# Patient Record
Sex: FEMALE | Race: WHITE | Employment: OTHER | URBAN - NONMETROPOLITAN AREA
[De-identification: names, ages, dates, MRNs, and addresses within clinical notes are randomized per-mention and may not be internally consistent; named-entity substitution may affect disease eponyms.]

---

## 2018-07-09 ENCOUNTER — APPOINTMENT (OUTPATIENT)
Dept: GENERAL RADIOLOGY | Age: 73
End: 2018-07-09
Payer: MEDICARE

## 2018-07-09 ENCOUNTER — HOSPITAL ENCOUNTER (EMERGENCY)
Age: 73
Discharge: HOME | End: 2018-07-09
Payer: MEDICARE

## 2018-07-09 VITALS
WEIGHT: 144 LBS | TEMPERATURE: 96.9 F | DIASTOLIC BLOOD PRESSURE: 64 MMHG | HEART RATE: 91 BPM | RESPIRATION RATE: 16 BRPM | SYSTOLIC BLOOD PRESSURE: 109 MMHG | OXYGEN SATURATION: 94 %

## 2018-07-09 DIAGNOSIS — S62.639A CLOSED AVULSION FRACTURE OF DISTAL PHALANX OF FINGER, INITIAL ENCOUNTER: Primary | ICD-10-CM

## 2018-07-09 DIAGNOSIS — S69.92XA INJURY OF LEFT WRIST, INITIAL ENCOUNTER: ICD-10-CM

## 2018-07-09 PROCEDURE — 99283 EMERGENCY DEPT VISIT LOW MDM: CPT

## 2018-07-09 PROCEDURE — 73080 X-RAY EXAM OF ELBOW: CPT

## 2018-07-09 PROCEDURE — 73090 X-RAY EXAM OF FOREARM: CPT

## 2018-07-09 PROCEDURE — 73110 X-RAY EXAM OF WRIST: CPT

## 2018-07-09 PROCEDURE — 73130 X-RAY EXAM OF HAND: CPT

## 2018-07-09 PROCEDURE — 29130 APPL FINGER SPLINT STATIC: CPT

## 2018-07-09 RX ORDER — ALENDRONATE SODIUM 70 MG/1
70 TABLET ORAL
COMMUNITY
End: 2022-08-15 | Stop reason: SDUPTHER

## 2018-07-09 RX ORDER — ALBUTEROL SULFATE 90 UG/1
2 AEROSOL, METERED RESPIRATORY (INHALATION) EVERY 6 HOURS PRN
Status: ON HOLD | COMMUNITY
End: 2018-10-08

## 2018-07-09 RX ORDER — PREGABALIN 100 MG/1
300 CAPSULE ORAL 2 TIMES DAILY
Status: ON HOLD | COMMUNITY
End: 2020-11-06 | Stop reason: SDUPTHER

## 2018-07-09 RX ORDER — PANTOPRAZOLE SODIUM 40 MG/1
40 GRANULE, DELAYED RELEASE ORAL
COMMUNITY
End: 2022-08-15 | Stop reason: SDUPTHER

## 2018-07-09 RX ORDER — POTASSIUM CHLORIDE 1.5 G/1.77G
20 POWDER, FOR SOLUTION ORAL 2 TIMES DAILY
Status: ON HOLD | COMMUNITY
End: 2022-08-29

## 2018-07-09 RX ORDER — DULOXETIN HYDROCHLORIDE 60 MG/1
60 CAPSULE, DELAYED RELEASE ORAL DAILY
Status: ON HOLD | COMMUNITY
End: 2022-08-29

## 2018-07-09 RX ORDER — HYDROCODONE BITARTRATE AND ACETAMINOPHEN 10; 325 MG/1; MG/1
1 TABLET ORAL EVERY 8 HOURS PRN
Status: ON HOLD | COMMUNITY
End: 2020-11-05 | Stop reason: HOSPADM

## 2018-07-09 RX ORDER — LEVOTHYROXINE SODIUM 0.15 MG/1
200 TABLET ORAL DAILY
COMMUNITY
End: 2022-06-27 | Stop reason: SDUPTHER

## 2018-07-09 RX ORDER — TRAZODONE HYDROCHLORIDE 100 MG/1
100 TABLET ORAL NIGHTLY
COMMUNITY
End: 2022-08-02 | Stop reason: SDUPTHER

## 2018-07-09 RX ORDER — CYANOCOBALAMIN (VITAMIN B-12) 1000 MCG
1 TABLET, EXTENDED RELEASE ORAL 2 TIMES DAILY WITH MEALS
Status: ON HOLD | COMMUNITY
End: 2022-08-29

## 2018-07-09 ASSESSMENT — PAIN DESCRIPTION - LOCATION: LOCATION: HAND

## 2018-07-09 ASSESSMENT — PAIN - FUNCTIONAL ASSESSMENT: PAIN_FUNCTIONAL_ASSESSMENT: 0-10

## 2018-07-09 ASSESSMENT — PAIN SCALES - GENERAL
PAINLEVEL_OUTOF10: 7

## 2018-07-09 ASSESSMENT — PAIN DESCRIPTION - PAIN TYPE: TYPE: ACUTE PAIN

## 2018-07-09 ASSESSMENT — PAIN DESCRIPTION - ORIENTATION: ORIENTATION: LEFT

## 2018-07-10 ASSESSMENT — ENCOUNTER SYMPTOMS
COUGH: 0
SORE THROAT: 0
CONSTIPATION: 0
RHINORRHEA: 0
CHEST TIGHTNESS: 0
BACK PAIN: 0
DIARRHEA: 0
ABDOMINAL PAIN: 0
EYE DISCHARGE: 0
NAUSEA: 0
EYE REDNESS: 0
VOMITING: 0
BLOOD IN STOOL: 0
WHEEZING: 0
SHORTNESS OF BREATH: 0

## 2018-07-11 ENCOUNTER — APPOINTMENT (OUTPATIENT)
Dept: CT IMAGING | Age: 73
End: 2018-07-11
Payer: MEDICARE

## 2018-07-11 ENCOUNTER — HOSPITAL ENCOUNTER (EMERGENCY)
Age: 73
Discharge: ANOTHER HOSPITAL | End: 2018-07-11
Attending: EMERGENCY MEDICINE
Payer: MEDICARE

## 2018-07-11 VITALS
DIASTOLIC BLOOD PRESSURE: 85 MMHG | TEMPERATURE: 98.2 F | SYSTOLIC BLOOD PRESSURE: 138 MMHG | RESPIRATION RATE: 18 BRPM | OXYGEN SATURATION: 97 % | HEART RATE: 82 BPM

## 2018-07-11 LAB
ABSOLUTE EOS #: 0.13 K/UL (ref 0–0.44)
ABSOLUTE IMMATURE GRANULOCYTE: <0.03 K/UL (ref 0–0.3)
ABSOLUTE LYMPH #: 1.73 K/UL (ref 1.1–3.7)
ABSOLUTE MONO #: 0.39 K/UL (ref 0.1–1.2)
ALBUMIN SERPL-MCNC: 3.8 G/DL (ref 3.5–5.2)
ALBUMIN/GLOBULIN RATIO: 1 (ref 1–2.5)
ALP BLD-CCNC: 77 U/L (ref 35–104)
ALT SERPL-CCNC: 44 U/L (ref 5–33)
ANION GAP SERPL CALCULATED.3IONS-SCNC: 12 MMOL/L (ref 9–17)
AST SERPL-CCNC: 75 U/L
BASOPHILS # BLD: 1 % (ref 0–2)
BASOPHILS ABSOLUTE: 0.04 K/UL (ref 0–0.2)
BILIRUB SERPL-MCNC: 0.18 MG/DL (ref 0.3–1.2)
BUN BLDV-MCNC: 12 MG/DL (ref 8–23)
BUN/CREAT BLD: 15 (ref 9–20)
C-REACTIVE PROTEIN: 33.5 MG/L (ref 0–5)
CALCIUM SERPL-MCNC: 9.6 MG/DL (ref 8.6–10.4)
CHLORIDE BLD-SCNC: 98 MMOL/L (ref 98–107)
CO2: 30 MMOL/L (ref 20–31)
CREAT SERPL-MCNC: 0.78 MG/DL (ref 0.5–0.9)
DIFFERENTIAL TYPE: ABNORMAL
EOSINOPHILS RELATIVE PERCENT: 3 % (ref 1–4)
GFR AFRICAN AMERICAN: >60 ML/MIN
GFR NON-AFRICAN AMERICAN: >60 ML/MIN
GFR SERPL CREATININE-BSD FRML MDRD: ABNORMAL ML/MIN/{1.73_M2}
GFR SERPL CREATININE-BSD FRML MDRD: ABNORMAL ML/MIN/{1.73_M2}
GLUCOSE BLD-MCNC: 97 MG/DL (ref 70–99)
HCT VFR BLD CALC: 28.6 % (ref 36.3–47.1)
HEMOGLOBIN: 9.1 G/DL (ref 11.9–15.1)
IMMATURE GRANULOCYTES: 0 %
LYMPHOCYTES # BLD: 39 % (ref 24–43)
MCH RBC QN AUTO: 26.3 PG (ref 25.2–33.5)
MCHC RBC AUTO-ENTMCNC: 31.8 G/DL (ref 28.4–34.8)
MCV RBC AUTO: 82.7 FL (ref 82.6–102.9)
MONOCYTES # BLD: 9 % (ref 3–12)
NRBC AUTOMATED: 0 PER 100 WBC
PDW BLD-RTO: 14.7 % (ref 11.8–14.4)
PLATELET # BLD: 334 K/UL (ref 138–453)
PLATELET ESTIMATE: ABNORMAL
PMV BLD AUTO: 9.5 FL (ref 8.1–13.5)
POTASSIUM SERPL-SCNC: 3.4 MMOL/L (ref 3.7–5.3)
RBC # BLD: 3.46 M/UL (ref 3.95–5.11)
RBC # BLD: ABNORMAL 10*6/UL
SEG NEUTROPHILS: 48 % (ref 36–65)
SEGMENTED NEUTROPHILS ABSOLUTE COUNT: 2.09 K/UL (ref 1.5–8.1)
SODIUM BLD-SCNC: 140 MMOL/L (ref 135–144)
TOTAL PROTEIN: 7.6 G/DL (ref 6.4–8.3)
WBC # BLD: 4.4 K/UL (ref 3.5–11.3)
WBC # BLD: ABNORMAL 10*3/UL

## 2018-07-11 PROCEDURE — 80053 COMPREHEN METABOLIC PANEL: CPT

## 2018-07-11 PROCEDURE — 85025 COMPLETE CBC W/AUTO DIFF WBC: CPT

## 2018-07-11 PROCEDURE — 99284 EMERGENCY DEPT VISIT MOD MDM: CPT

## 2018-07-11 PROCEDURE — 6360000004 HC RX CONTRAST MEDICATION: Performed by: EMERGENCY MEDICINE

## 2018-07-11 PROCEDURE — 86140 C-REACTIVE PROTEIN: CPT

## 2018-07-11 PROCEDURE — 36415 COLL VENOUS BLD VENIPUNCTURE: CPT

## 2018-07-11 PROCEDURE — 72132 CT LUMBAR SPINE W/DYE: CPT

## 2018-07-11 RX ADMIN — IOPAMIDOL 100 ML: 612 INJECTION, SOLUTION INTRAVENOUS at 18:14

## 2018-07-11 ASSESSMENT — ENCOUNTER SYMPTOMS
SORE THROAT: 0
EYE DISCHARGE: 0
EYE PAIN: 0
SINUS PAIN: 0
DIARRHEA: 0
COUGH: 0
SINUS PRESSURE: 0
SHORTNESS OF BREATH: 0
VOMITING: 0
ABDOMINAL PAIN: 0
NAUSEA: 0

## 2018-07-11 ASSESSMENT — PAIN DESCRIPTION - LOCATION: LOCATION: BACK

## 2018-07-11 ASSESSMENT — PAIN DESCRIPTION - PAIN TYPE: TYPE: ACUTE PAIN

## 2018-07-11 ASSESSMENT — PAIN DESCRIPTION - ORIENTATION: ORIENTATION: LOWER

## 2018-07-11 ASSESSMENT — PAIN SCALES - GENERAL: PAINLEVEL_OUTOF10: 7

## 2018-07-12 NOTE — ED NOTES
[facility] to be in contact with accepting doctor, Dr. Haroon Chinchilla.      [place]  07/11/18 2046

## 2018-07-23 ENCOUNTER — HOSPITAL ENCOUNTER (OUTPATIENT)
Age: 73
Setting detail: SPECIMEN
Discharge: HOME | End: 2018-07-23
Payer: MEDICARE

## 2018-07-23 LAB
ABSOLUTE EOS #: 0.07 K/UL (ref 0–0.44)
ABSOLUTE IMMATURE GRANULOCYTE: <0.03 K/UL (ref 0–0.3)
ABSOLUTE LYMPH #: 2.17 K/UL (ref 1.1–3.7)
ABSOLUTE MONO #: 0.5 K/UL (ref 0.1–1.2)
BASOPHILS # BLD: 1 % (ref 0–2)
BASOPHILS ABSOLUTE: 0.03 K/UL (ref 0–0.2)
C-REACTIVE PROTEIN: 2.9 MG/L (ref 0–5)
DIFFERENTIAL TYPE: ABNORMAL
EOSINOPHILS RELATIVE PERCENT: 2 % (ref 1–4)
HCT VFR BLD CALC: 28.4 % (ref 36.3–47.1)
HEMOGLOBIN: 8.9 G/DL (ref 11.9–15.1)
IMMATURE GRANULOCYTES: 0 %
LYMPHOCYTES # BLD: 56 % (ref 24–43)
MCH RBC QN AUTO: 25.3 PG (ref 25.2–33.5)
MCHC RBC AUTO-ENTMCNC: 31.3 G/DL (ref 28.4–34.8)
MCV RBC AUTO: 80.7 FL (ref 82.6–102.9)
MONOCYTES # BLD: 13 % (ref 3–12)
NRBC AUTOMATED: 0 PER 100 WBC
PDW BLD-RTO: 15.3 % (ref 11.8–14.4)
PLATELET # BLD: 364 K/UL (ref 138–453)
PLATELET ESTIMATE: ABNORMAL
PMV BLD AUTO: 10.4 FL (ref 8.1–13.5)
RBC # BLD: 3.52 M/UL (ref 3.95–5.11)
RBC # BLD: ABNORMAL 10*6/UL
SEG NEUTROPHILS: 28 % (ref 36–65)
SEGMENTED NEUTROPHILS ABSOLUTE COUNT: 1.07 K/UL (ref 1.5–8.1)
VANCOMYCIN TROUGH DATE LAST DOSE: NORMAL
VANCOMYCIN TROUGH DOSE AMOUNT: NORMAL
VANCOMYCIN TROUGH TIME LAST DOSE: NORMAL
VANCOMYCIN TROUGH: 18.8 UG/ML (ref 10–20)
WBC # BLD: 3.8 K/UL (ref 3.5–11.3)
WBC # BLD: ABNORMAL 10*3/UL

## 2018-07-23 PROCEDURE — 80202 ASSAY OF VANCOMYCIN: CPT

## 2018-07-23 PROCEDURE — 82565 ASSAY OF CREATININE: CPT

## 2018-07-23 PROCEDURE — 85025 COMPLETE CBC W/AUTO DIFF WBC: CPT

## 2018-07-23 PROCEDURE — 86140 C-REACTIVE PROTEIN: CPT

## 2018-07-24 LAB
CREAT SERPL-MCNC: 0.69 MG/DL (ref 0.5–0.9)
GFR AFRICAN AMERICAN: >60 ML/MIN
GFR NON-AFRICAN AMERICAN: >60 ML/MIN
GFR SERPL CREATININE-BSD FRML MDRD: NORMAL ML/MIN/{1.73_M2}
GFR SERPL CREATININE-BSD FRML MDRD: NORMAL ML/MIN/{1.73_M2}

## 2018-07-30 ENCOUNTER — HOSPITAL ENCOUNTER (OUTPATIENT)
Age: 73
Setting detail: SPECIMEN
Discharge: HOME | End: 2018-07-30
Payer: MEDICARE

## 2018-07-30 LAB
ABSOLUTE EOS #: 0.13 K/UL (ref 0–0.44)
ABSOLUTE IMMATURE GRANULOCYTE: <0.03 K/UL (ref 0–0.3)
ABSOLUTE LYMPH #: 1.18 K/UL (ref 1.1–3.7)
ABSOLUTE MONO #: 0.47 K/UL (ref 0.1–1.2)
ANION GAP SERPL CALCULATED.3IONS-SCNC: 11 MMOL/L (ref 9–17)
BASOPHILS # BLD: 1 % (ref 0–2)
BASOPHILS ABSOLUTE: <0.03 K/UL (ref 0–0.2)
BUN BLDV-MCNC: 18 MG/DL (ref 8–23)
BUN/CREAT BLD: 33 (ref 9–20)
C-REACTIVE PROTEIN: 7.3 MG/L (ref 0–5)
CALCIUM SERPL-MCNC: 9.5 MG/DL (ref 8.6–10.4)
CHLORIDE BLD-SCNC: 104 MMOL/L (ref 98–107)
CO2: 27 MMOL/L (ref 20–31)
CREAT SERPL-MCNC: 0.55 MG/DL (ref 0.5–0.9)
DIFFERENTIAL TYPE: ABNORMAL
EOSINOPHILS RELATIVE PERCENT: 4 % (ref 1–4)
GFR AFRICAN AMERICAN: >60 ML/MIN
GFR NON-AFRICAN AMERICAN: >60 ML/MIN
GFR SERPL CREATININE-BSD FRML MDRD: ABNORMAL ML/MIN/{1.73_M2}
GFR SERPL CREATININE-BSD FRML MDRD: ABNORMAL ML/MIN/{1.73_M2}
GLUCOSE BLD-MCNC: 82 MG/DL (ref 70–99)
HCT VFR BLD CALC: 27.1 % (ref 36.3–47.1)
HEMOGLOBIN: 8.3 G/DL (ref 11.9–15.1)
IMMATURE GRANULOCYTES: 0 %
LYMPHOCYTES # BLD: 32 % (ref 24–43)
MCH RBC QN AUTO: 24.7 PG (ref 25.2–33.5)
MCHC RBC AUTO-ENTMCNC: 30.6 G/DL (ref 28.4–34.8)
MCV RBC AUTO: 80.7 FL (ref 82.6–102.9)
MONOCYTES # BLD: 13 % (ref 3–12)
NRBC AUTOMATED: 0 PER 100 WBC
PDW BLD-RTO: 15.8 % (ref 11.8–14.4)
PLATELET # BLD: 233 K/UL (ref 138–453)
PLATELET ESTIMATE: ABNORMAL
PMV BLD AUTO: 10.4 FL (ref 8.1–13.5)
POTASSIUM SERPL-SCNC: 4.3 MMOL/L (ref 3.7–5.3)
RBC # BLD: 3.36 M/UL (ref 3.95–5.11)
RBC # BLD: ABNORMAL 10*6/UL
SEG NEUTROPHILS: 51 % (ref 36–65)
SEGMENTED NEUTROPHILS ABSOLUTE COUNT: 1.85 K/UL (ref 1.5–8.1)
SODIUM BLD-SCNC: 142 MMOL/L (ref 135–144)
THYROXINE, FREE: 1 NG/DL (ref 0.93–1.7)
TSH SERPL DL<=0.05 MIU/L-ACNC: 0.38 MIU/L (ref 0.3–5)
VANCOMYCIN TROUGH DATE LAST DOSE: NORMAL
VANCOMYCIN TROUGH DOSE AMOUNT: NORMAL
VANCOMYCIN TROUGH TIME LAST DOSE: NORMAL
VANCOMYCIN TROUGH: 16.5 UG/ML (ref 10–20)
WBC # BLD: 3.7 K/UL (ref 3.5–11.3)
WBC # BLD: ABNORMAL 10*3/UL

## 2018-07-30 PROCEDURE — 84443 ASSAY THYROID STIM HORMONE: CPT

## 2018-07-30 PROCEDURE — 80048 BASIC METABOLIC PNL TOTAL CA: CPT

## 2018-07-30 PROCEDURE — 85025 COMPLETE CBC W/AUTO DIFF WBC: CPT

## 2018-07-30 PROCEDURE — 86140 C-REACTIVE PROTEIN: CPT

## 2018-07-30 PROCEDURE — 80202 ASSAY OF VANCOMYCIN: CPT

## 2018-07-30 PROCEDURE — 84439 ASSAY OF FREE THYROXINE: CPT

## 2018-10-05 ENCOUNTER — APPOINTMENT (OUTPATIENT)
Dept: CT IMAGING | Age: 73
DRG: 193 | End: 2018-10-05
Payer: MEDICARE

## 2018-10-05 ENCOUNTER — APPOINTMENT (OUTPATIENT)
Dept: GENERAL RADIOLOGY | Age: 73
DRG: 193 | End: 2018-10-05
Payer: MEDICARE

## 2018-10-05 ENCOUNTER — HOSPITAL ENCOUNTER (INPATIENT)
Age: 73
LOS: 3 days | Discharge: HOME | DRG: 193 | End: 2018-10-08
Attending: EMERGENCY MEDICINE | Admitting: FAMILY MEDICINE
Payer: MEDICARE

## 2018-10-05 DIAGNOSIS — J18.9 PNEUMONIA DUE TO ORGANISM: Primary | ICD-10-CM

## 2018-10-05 PROBLEM — M54.50 CHRONIC MIDLINE LOW BACK PAIN WITHOUT SCIATICA: Status: ACTIVE | Noted: 2018-10-05

## 2018-10-05 PROBLEM — G93.41 ACUTE METABOLIC ENCEPHALOPATHY: Status: ACTIVE | Noted: 2018-10-05

## 2018-10-05 PROBLEM — E03.9 HYPOTHYROIDISM: Status: ACTIVE | Noted: 2018-10-05

## 2018-10-05 PROBLEM — F32.9 MAJOR DEPRESSIVE DISORDER: Status: ACTIVE | Noted: 2018-10-05

## 2018-10-05 PROBLEM — J16.0 PNEUMONIA OF RIGHT UPPER LOBE DUE TO CHLAMYDIA SPECIES: Status: ACTIVE | Noted: 2018-10-05

## 2018-10-05 PROBLEM — G89.29 CHRONIC MIDLINE LOW BACK PAIN WITHOUT SCIATICA: Status: ACTIVE | Noted: 2018-10-05

## 2018-10-05 LAB
ABSOLUTE EOS #: 0.08 K/UL (ref 0–0.44)
ABSOLUTE IMMATURE GRANULOCYTE: 0 K/UL (ref 0–0.3)
ABSOLUTE LYMPH #: 0.75 K/UL (ref 1.1–3.7)
ABSOLUTE MONO #: 0 K/UL (ref 0.1–1.2)
ALBUMIN SERPL-MCNC: 3.8 G/DL (ref 3.5–5.2)
ALBUMIN/GLOBULIN RATIO: 1.3 (ref 1–2.5)
ALP BLD-CCNC: 78 U/L (ref 35–104)
ALT SERPL-CCNC: 15 U/L (ref 5–33)
ANION GAP SERPL CALCULATED.3IONS-SCNC: 10 MMOL/L (ref 9–17)
AST SERPL-CCNC: 18 U/L
BASOPHILS # BLD: 0 % (ref 0–2)
BASOPHILS ABSOLUTE: 0 K/UL (ref 0–0.2)
BILIRUB SERPL-MCNC: 0.27 MG/DL (ref 0.3–1.2)
BILIRUBIN DIRECT: <0.08 MG/DL
BILIRUBIN, INDIRECT: ABNORMAL MG/DL (ref 0–1)
BNP INTERPRETATION: NORMAL
BUN BLDV-MCNC: 12 MG/DL (ref 8–23)
BUN/CREAT BLD: 17 (ref 9–20)
CALCIUM SERPL-MCNC: 9.2 MG/DL (ref 8.6–10.4)
CHLORIDE BLD-SCNC: 94 MMOL/L (ref 98–107)
CO2: 32 MMOL/L (ref 20–31)
CREAT SERPL-MCNC: 0.7 MG/DL (ref 0.5–0.9)
DIFFERENTIAL TYPE: ABNORMAL
EKG ATRIAL RATE: 121 BPM
EKG P AXIS: 59 DEGREES
EKG P-R INTERVAL: 158 MS
EKG Q-T INTERVAL: 318 MS
EKG QRS DURATION: 74 MS
EKG QTC CALCULATION (BAZETT): 451 MS
EKG R AXIS: -18 DEGREES
EKG T AXIS: 77 DEGREES
EKG VENTRICULAR RATE: 121 BPM
EOSINOPHILS RELATIVE PERCENT: 1 % (ref 1–4)
GFR AFRICAN AMERICAN: >60 ML/MIN
GFR NON-AFRICAN AMERICAN: >60 ML/MIN
GFR SERPL CREATININE-BSD FRML MDRD: ABNORMAL ML/MIN/{1.73_M2}
GFR SERPL CREATININE-BSD FRML MDRD: ABNORMAL ML/MIN/{1.73_M2}
GLOBULIN: ABNORMAL G/DL (ref 1.5–3.8)
GLUCOSE BLD-MCNC: 115 MG/DL (ref 70–99)
HCT VFR BLD CALC: 35.6 % (ref 36.3–47.1)
HEMOGLOBIN: 10.8 G/DL (ref 11.9–15.1)
IMMATURE GRANULOCYTES: 0 %
LACTIC ACID, SEPSIS WHOLE BLOOD: NORMAL MMOL/L (ref 0.5–1.9)
LACTIC ACID, SEPSIS: 1.5 MMOL/L (ref 0.5–1.9)
LACTIC ACID, WHOLE BLOOD: NORMAL MMOL/L (ref 0.7–2.1)
LACTIC ACID: 2 MMOL/L (ref 0.5–2.2)
LYMPHOCYTES # BLD: 10 % (ref 24–43)
MCH RBC QN AUTO: 24.8 PG (ref 25.2–33.5)
MCHC RBC AUTO-ENTMCNC: 30.3 G/DL (ref 28.4–34.8)
MCV RBC AUTO: 81.8 FL (ref 82.6–102.9)
MONOCYTES # BLD: 0 % (ref 3–12)
MORPHOLOGY: NORMAL
NRBC AUTOMATED: 0 PER 100 WBC
PDW BLD-RTO: 20.5 % (ref 11.8–14.4)
PLATELET # BLD: 245 K/UL (ref 138–453)
PLATELET ESTIMATE: ABNORMAL
PMV BLD AUTO: 9.4 FL (ref 8.1–13.5)
POTASSIUM SERPL-SCNC: 3.2 MMOL/L (ref 3.7–5.3)
PRO-BNP: 143 PG/ML
RBC # BLD: 4.35 M/UL (ref 3.95–5.11)
RBC # BLD: ABNORMAL 10*6/UL
SEG NEUTROPHILS: 89 % (ref 36–65)
SEGMENTED NEUTROPHILS ABSOLUTE COUNT: 6.67 K/UL (ref 1.5–8.1)
SODIUM BLD-SCNC: 136 MMOL/L (ref 135–144)
TOTAL PROTEIN: 6.8 G/DL (ref 6.4–8.3)
TROPONIN INTERP: NORMAL
TROPONIN T: <0.03 NG/ML
WBC # BLD: 7.5 K/UL (ref 3.5–11.3)
WBC # BLD: ABNORMAL 10*3/UL

## 2018-10-05 PROCEDURE — 36415 COLL VENOUS BLD VENIPUNCTURE: CPT

## 2018-10-05 PROCEDURE — 6360000002 HC RX W HCPCS: Performed by: FAMILY MEDICINE

## 2018-10-05 PROCEDURE — 93005 ELECTROCARDIOGRAM TRACING: CPT

## 2018-10-05 PROCEDURE — 6370000000 HC RX 637 (ALT 250 FOR IP): Performed by: FAMILY MEDICINE

## 2018-10-05 PROCEDURE — 6370000000 HC RX 637 (ALT 250 FOR IP)

## 2018-10-05 PROCEDURE — 94668 MNPJ CHEST WALL SBSQ: CPT

## 2018-10-05 PROCEDURE — 70450 CT HEAD/BRAIN W/O DYE: CPT

## 2018-10-05 PROCEDURE — 80048 BASIC METABOLIC PNL TOTAL CA: CPT

## 2018-10-05 PROCEDURE — 84484 ASSAY OF TROPONIN QUANT: CPT

## 2018-10-05 PROCEDURE — 6370000000 HC RX 637 (ALT 250 FOR IP): Performed by: EMERGENCY MEDICINE

## 2018-10-05 PROCEDURE — 2580000003 HC RX 258: Performed by: EMERGENCY MEDICINE

## 2018-10-05 PROCEDURE — 6360000002 HC RX W HCPCS: Performed by: EMERGENCY MEDICINE

## 2018-10-05 PROCEDURE — 85025 COMPLETE CBC W/AUTO DIFF WBC: CPT

## 2018-10-05 PROCEDURE — 2000000000 HC ICU R&B

## 2018-10-05 PROCEDURE — 71046 X-RAY EXAM CHEST 2 VIEWS: CPT

## 2018-10-05 PROCEDURE — 83880 ASSAY OF NATRIURETIC PEPTIDE: CPT

## 2018-10-05 PROCEDURE — 99285 EMERGENCY DEPT VISIT HI MDM: CPT

## 2018-10-05 PROCEDURE — 2580000003 HC RX 258: Performed by: FAMILY MEDICINE

## 2018-10-05 PROCEDURE — 94640 AIRWAY INHALATION TREATMENT: CPT

## 2018-10-05 PROCEDURE — 94667 MNPJ CHEST WALL 1ST: CPT

## 2018-10-05 PROCEDURE — 94761 N-INVAS EAR/PLS OXIMETRY MLT: CPT

## 2018-10-05 PROCEDURE — 2700000000 HC OXYGEN THERAPY PER DAY

## 2018-10-05 PROCEDURE — 80076 HEPATIC FUNCTION PANEL: CPT

## 2018-10-05 PROCEDURE — 87040 BLOOD CULTURE FOR BACTERIA: CPT

## 2018-10-05 PROCEDURE — 83605 ASSAY OF LACTIC ACID: CPT

## 2018-10-05 PROCEDURE — 94664 DEMO&/EVAL PT USE INHALER: CPT

## 2018-10-05 RX ORDER — ALBUTEROL SULFATE 90 UG/1
2 AEROSOL, METERED RESPIRATORY (INHALATION) EVERY 6 HOURS PRN
Status: DISCONTINUED | OUTPATIENT
Start: 2018-10-05 | End: 2018-10-08 | Stop reason: HOSPADM

## 2018-10-05 RX ORDER — ALBUTEROL SULFATE 2.5 MG/3ML
2.5 SOLUTION RESPIRATORY (INHALATION) EVERY 4 HOURS PRN
Status: DISCONTINUED | OUTPATIENT
Start: 2018-10-05 | End: 2018-10-08 | Stop reason: HOSPADM

## 2018-10-05 RX ORDER — SODIUM CHLORIDE 0.9 % (FLUSH) 0.9 %
10 SYRINGE (ML) INJECTION EVERY 12 HOURS SCHEDULED
Status: DISCONTINUED | OUTPATIENT
Start: 2018-10-05 | End: 2018-10-08 | Stop reason: HOSPADM

## 2018-10-05 RX ORDER — ALENDRONATE SODIUM 70 MG/1
70 TABLET ORAL
Status: DISCONTINUED | OUTPATIENT
Start: 2018-10-05 | End: 2018-10-05 | Stop reason: CLARIF

## 2018-10-05 RX ORDER — IPRATROPIUM BROMIDE AND ALBUTEROL SULFATE 2.5; .5 MG/3ML; MG/3ML
1 SOLUTION RESPIRATORY (INHALATION) EVERY 4 HOURS
Status: DISCONTINUED | OUTPATIENT
Start: 2018-10-05 | End: 2018-10-05

## 2018-10-05 RX ORDER — AZITHROMYCIN 250 MG/1
500 TABLET, FILM COATED ORAL ONCE
Status: COMPLETED | OUTPATIENT
Start: 2018-10-05 | End: 2018-10-05

## 2018-10-05 RX ORDER — PANTOPRAZOLE SODIUM 40 MG/1
40 TABLET, DELAYED RELEASE ORAL
Status: DISCONTINUED | OUTPATIENT
Start: 2018-10-05 | End: 2018-10-08 | Stop reason: HOSPADM

## 2018-10-05 RX ORDER — OYSTER SHELL CALCIUM WITH VITAMIN D 500; 200 MG/1; [IU]/1
1 TABLET, FILM COATED ORAL 2 TIMES DAILY WITH MEALS
Status: DISCONTINUED | OUTPATIENT
Start: 2018-10-05 | End: 2018-10-08 | Stop reason: HOSPADM

## 2018-10-05 RX ORDER — POTASSIUM CHLORIDE 20 MEQ/1
40 TABLET, EXTENDED RELEASE ORAL 2 TIMES DAILY
Status: DISCONTINUED | OUTPATIENT
Start: 2018-10-05 | End: 2018-10-08 | Stop reason: HOSPADM

## 2018-10-05 RX ORDER — IPRATROPIUM BROMIDE AND ALBUTEROL SULFATE 2.5; .5 MG/3ML; MG/3ML
1 SOLUTION RESPIRATORY (INHALATION) 4 TIMES DAILY
Status: DISCONTINUED | OUTPATIENT
Start: 2018-10-05 | End: 2018-10-08 | Stop reason: HOSPADM

## 2018-10-05 RX ORDER — HYDROCODONE BITARTRATE AND ACETAMINOPHEN 10; 325 MG/1; MG/1
1 TABLET ORAL EVERY 8 HOURS PRN
Status: DISCONTINUED | OUTPATIENT
Start: 2018-10-05 | End: 2018-10-06

## 2018-10-05 RX ORDER — ASPIRIN 81 MG/1
81 TABLET ORAL DAILY
Status: DISCONTINUED | OUTPATIENT
Start: 2018-10-05 | End: 2018-10-08 | Stop reason: HOSPADM

## 2018-10-05 RX ORDER — DULOXETIN HYDROCHLORIDE 60 MG/1
60 CAPSULE, DELAYED RELEASE ORAL DAILY
Status: DISCONTINUED | OUTPATIENT
Start: 2018-10-05 | End: 2018-10-08 | Stop reason: HOSPADM

## 2018-10-05 RX ORDER — SODIUM CHLORIDE 0.9 % (FLUSH) 0.9 %
10 SYRINGE (ML) INJECTION PRN
Status: DISCONTINUED | OUTPATIENT
Start: 2018-10-05 | End: 2018-10-08 | Stop reason: HOSPADM

## 2018-10-05 RX ORDER — PREGABALIN 75 MG/1
300 CAPSULE ORAL 2 TIMES DAILY
Status: DISCONTINUED | OUTPATIENT
Start: 2018-10-05 | End: 2018-10-08 | Stop reason: HOSPADM

## 2018-10-05 RX ORDER — 0.9 % SODIUM CHLORIDE 0.9 %
30 INTRAVENOUS SOLUTION INTRAVENOUS ONCE
Status: DISCONTINUED | OUTPATIENT
Start: 2018-10-05 | End: 2018-10-08 | Stop reason: HOSPADM

## 2018-10-05 RX ORDER — IPRATROPIUM BROMIDE AND ALBUTEROL SULFATE 2.5; .5 MG/3ML; MG/3ML
SOLUTION RESPIRATORY (INHALATION)
Status: COMPLETED
Start: 2018-10-05 | End: 2018-10-05

## 2018-10-05 RX ORDER — TRAZODONE HYDROCHLORIDE 50 MG/1
200 TABLET ORAL NIGHTLY
Status: DISCONTINUED | OUTPATIENT
Start: 2018-10-05 | End: 2018-10-08 | Stop reason: HOSPADM

## 2018-10-05 RX ORDER — SODIUM CHLORIDE 9 MG/ML
INJECTION, SOLUTION INTRAVENOUS CONTINUOUS
Status: DISCONTINUED | OUTPATIENT
Start: 2018-10-05 | End: 2018-10-08

## 2018-10-05 RX ORDER — 0.9 % SODIUM CHLORIDE 0.9 %
500 INTRAVENOUS SOLUTION INTRAVENOUS ONCE
Status: COMPLETED | OUTPATIENT
Start: 2018-10-05 | End: 2018-10-05

## 2018-10-05 RX ORDER — LEVOTHYROXINE SODIUM 0.15 MG/1
150 TABLET ORAL DAILY
Status: DISCONTINUED | OUTPATIENT
Start: 2018-10-05 | End: 2018-10-08 | Stop reason: HOSPADM

## 2018-10-05 RX ADMIN — IPRATROPIUM BROMIDE AND ALBUTEROL SULFATE 1 AMPULE: .5; 3 SOLUTION RESPIRATORY (INHALATION) at 15:43

## 2018-10-05 RX ADMIN — PREGABALIN 300 MG: 75 CAPSULE ORAL at 20:58

## 2018-10-05 RX ADMIN — AZITHROMYCIN 500 MG: 250 TABLET, FILM COATED ORAL at 08:32

## 2018-10-05 RX ADMIN — SODIUM CHLORIDE: 9 INJECTION, SOLUTION INTRAVENOUS at 10:28

## 2018-10-05 RX ADMIN — SODIUM CHLORIDE: 9 INJECTION, SOLUTION INTRAVENOUS at 23:44

## 2018-10-05 RX ADMIN — CEFTRIAXONE 1 G: 1 INJECTION, POWDER, FOR SOLUTION INTRAMUSCULAR; INTRAVENOUS at 08:33

## 2018-10-05 RX ADMIN — TRAZODONE HYDROCHLORIDE 200 MG: 50 TABLET ORAL at 20:58

## 2018-10-05 RX ADMIN — IPRATROPIUM BROMIDE AND ALBUTEROL SULFATE 1 AMPULE: 2.5; .5 SOLUTION RESPIRATORY (INHALATION) at 10:20

## 2018-10-05 RX ADMIN — PANTOPRAZOLE SODIUM 40 MG: 40 TABLET, DELAYED RELEASE ORAL at 16:50

## 2018-10-05 RX ADMIN — PREGABALIN 300 MG: 75 CAPSULE ORAL at 10:41

## 2018-10-05 RX ADMIN — POTASSIUM CHLORIDE 40 MEQ: 20 TABLET, EXTENDED RELEASE ORAL at 20:58

## 2018-10-05 RX ADMIN — SODIUM CHLORIDE 500 ML: 9 INJECTION, SOLUTION INTRAVENOUS at 08:21

## 2018-10-05 RX ADMIN — DULOXETINE HYDROCHLORIDE 60 MG: 60 CAPSULE, DELAYED RELEASE ORAL at 10:41

## 2018-10-05 RX ADMIN — IPRATROPIUM BROMIDE AND ALBUTEROL SULFATE 1 AMPULE: .5; 3 SOLUTION RESPIRATORY (INHALATION) at 10:20

## 2018-10-05 RX ADMIN — LEVOTHYROXINE SODIUM 150 MCG: 150 TABLET ORAL at 10:41

## 2018-10-05 RX ADMIN — HYDROCODONE BITARTRATE AND ACETAMINOPHEN 1 TABLET: 10; 325 TABLET ORAL at 20:57

## 2018-10-05 RX ADMIN — SODIUM CHLORIDE: 9 INJECTION, SOLUTION INTRAVENOUS at 16:52

## 2018-10-05 RX ADMIN — ASPIRIN 81 MG: 81 TABLET, COATED ORAL at 10:41

## 2018-10-05 RX ADMIN — IPRATROPIUM BROMIDE AND ALBUTEROL SULFATE 1 AMPULE: .5; 3 SOLUTION RESPIRATORY (INHALATION) at 20:37

## 2018-10-05 RX ADMIN — POTASSIUM CHLORIDE 40 MEQ: 20 TABLET, EXTENDED RELEASE ORAL at 10:41

## 2018-10-05 RX ADMIN — HYDROCODONE BITARTRATE AND ACETAMINOPHEN 1 TABLET: 10; 325 TABLET ORAL at 10:41

## 2018-10-05 RX ADMIN — Medication 1 TABLET: at 10:41

## 2018-10-05 RX ADMIN — ENOXAPARIN SODIUM 40 MG: 40 INJECTION SUBCUTANEOUS at 10:41

## 2018-10-05 RX ADMIN — Medication 1 TABLET: at 16:50

## 2018-10-05 ASSESSMENT — PAIN DESCRIPTION - DESCRIPTORS
DESCRIPTORS: ACHING
DESCRIPTORS: BURNING
DESCRIPTORS: ACHING

## 2018-10-05 ASSESSMENT — PAIN DESCRIPTION - PAIN TYPE
TYPE: OTHER (COMMENT);CHRONIC PAIN
TYPE: CHRONIC PAIN

## 2018-10-05 ASSESSMENT — PAIN SCALES - GENERAL
PAINLEVEL_OUTOF10: 7
PAINLEVEL_OUTOF10: 4
PAINLEVEL_OUTOF10: 7

## 2018-10-05 ASSESSMENT — PAIN DESCRIPTION - LOCATION
LOCATION: BACK
LOCATION: BACK;HIP
LOCATION: BACK

## 2018-10-05 ASSESSMENT — PAIN DESCRIPTION - FREQUENCY
FREQUENCY: CONTINUOUS

## 2018-10-05 NOTE — ED PROVIDER NOTES
Pupils are equal, round, and reactive to light. Conjunctivae and EOM are normal.   Neck: Normal range of motion. Neck supple. Cardiovascular: Normal rate, regular rhythm and normal heart sounds. Pulmonary/Chest: Effort normal and breath sounds normal. No respiratory distress. Right sided coarse breath sounds consistent with ammonia   Abdominal: Soft. Bowel sounds are normal.   Musculoskeletal:   Tremulous and bilaterally weak on upper and lower extremities   Neurological: She is alert and oriented to person, place, and time. She displays normal reflexes. No cranial nerve deficit. She exhibits abnormal muscle tone. Coordination abnormal.   Skin: Skin is warm and dry. DIAGNOSTIC RESULTS     EKG: All EKG's are interpreted by the Emergency Department Physician who either signs or Co-signs this chart in the absence of a cardiologist.      RADIOLOGY:   Non-plain film images such as CT, Ultrasound and MRI are read by the radiologist. Plain radiographic images are visualized and preliminarily interpreted by the emergency physician with the below findings:      Interpretation per the Radiologist below, if available at the time of this note:    XR CHEST STANDARD (2 VW)   Final Result   Slight interval improvement presumed right-sided pneumonia. No other   interval change. CT Head WO Contrast   Final Result   No acute intracranial abnormality. Mild cerebral atrophy and chronic small vessel ischemic changes in the white   matter. XR CHEST STANDARD (2 VW)   Final Result   Opacities in the right upper lobe and right middle lobe are presumed to   represent pneumonia. Recommend follow-up chest radiograph after treatment to   ensure resolution.                ED BEDSIDE ULTRASOUND:   Performed by ED Physician - none    LABS:  Labs Reviewed   BASIC METABOLIC PANEL - Abnormal; Notable for the following:        Result Value    Glucose 115 (*)     Potassium 3.2 (*)     Chloride 94 (*)     CO2 32 (*)

## 2018-10-05 NOTE — H&P
sciatica 10/05/2018       Plan:     · This patient requires inpatient admission  To icu because of pneumonia involving rt upper and lower lobe and change in mental status requiring iv antibiotics,fluids ,oxygen,aerosols  · Factors affecting the medical complexity of this patient include acute metabolic encephalopathy  · Estimated length of stay is 2 days  · High risk medication monitoring: none    CORE MEASURES  DVT prophylaxis: Lovenox  Decubitus ulcer present on admission: No  CODE STATUS: FULL CODE  Nutrition Status: fair   Physical therapy: NA   Old Charts reviewed: Yes  EKG Reviewed:  Yes  Advance Directive Addressed: Yes  Total time spent in evaluation and management of this patient- 40 min    Pattie Herrera MD  10/5/2018, 1:03 PM

## 2018-10-05 NOTE — PROGRESS NOTES
physician if condition deteriorates. MDI THERAPY with  2 actuations of [physician-ordered bronchodilator(s)] via spacer TID Albuterol and PRNq4 hrs. If unable to utilize MDI: HHN [physician-ordered bronchodilator(s)] TID and Albuterol PRN q4 hrs. Notify physician if condition deteriorates. MDI THERAPY with  [physician-ordered bronchodilator(s)] via spacer TID PRN. If unable to utilize MDI: HHN [physician-ordered bronchodilator(s)] TID PRN. Notify physician if condition deteriorates. If Acuity Level is 2, 3, or 4 in any of the following:    [] COUGH     [] SURGICAL HISTORY (SURG HX)  [x] CHEST XRAY (CXR)    Goal: Improvement in sputum mobilization in patients with ineffective airway clearance. Reverse atelectasis. [x] Bronchopulmonary Hygiene Protocol    Total Acuity:   16-32  []  Secondary Assessment in 24 hrs Total Acuity:  9-15  [x]  Secondary Assessment in 24 hrs Total Acuity:  4-8  []  Secondary Assessment in 48 hrs Total Acuity:  0-3  []  Secondary Assessment in 72 hrs   METANEB QID with [physician-ordered bronchodilator(s)] if CXR Acuity = 4; otherwise:  PD&P, PEP, or Vest QID & PRN  NT Sxn PRN for ineffective cough  METANEB QID with [physician-ordered bronchodilator(s)] if CXR Acuity = 4; otherwise:  PD&P, PEP, or Vest TID & PRN  NT Sxn PRN for ineffective cough  Instruct patient to self-perform IS q1hr WA   Directed Cough self-performed q1hr WA     If Acuity Level is 2 or above in the following:    [] PULMONARY HISTORY (PULM HX)    Goal: Assist patient in quitting smoking to slow or stop the progression of lung disease.     [] Smoking Cessation Protocol    SMOKING CESSATION EDUCATION provided according to policy JJ_239: (marlyn with an X)  ____Yes    ____ No     ____ NA    Smoking Cessation Booklet given:  ____Yes  ____No ____Patient Ottie Opitz

## 2018-10-06 PROBLEM — D50.9 IRON DEFICIENCY ANEMIA: Status: ACTIVE | Noted: 2018-10-05

## 2018-10-06 LAB
ABSOLUTE EOS #: 0.17 K/UL (ref 0–0.44)
ABSOLUTE IMMATURE GRANULOCYTE: 0 K/UL (ref 0–0.3)
ABSOLUTE LYMPH #: 1.12 K/UL (ref 1.1–3.7)
ABSOLUTE MONO #: 0.52 K/UL (ref 0.1–1.2)
ALBUMIN SERPL-MCNC: 2.8 G/DL (ref 3.5–5.2)
ALBUMIN/GLOBULIN RATIO: 1 (ref 1–2.5)
ALP BLD-CCNC: 59 U/L (ref 35–104)
ALT SERPL-CCNC: 10 U/L (ref 5–33)
ANION GAP SERPL CALCULATED.3IONS-SCNC: 6 MMOL/L (ref 9–17)
AST SERPL-CCNC: 14 U/L
BASOPHILS # BLD: 0 % (ref 0–2)
BASOPHILS ABSOLUTE: 0 K/UL (ref 0–0.2)
BILIRUB SERPL-MCNC: 0.18 MG/DL (ref 0.3–1.2)
BUN BLDV-MCNC: 9 MG/DL (ref 8–23)
BUN/CREAT BLD: 16 (ref 9–20)
CALCIUM SERPL-MCNC: 8 MG/DL (ref 8.6–10.4)
CHLORIDE BLD-SCNC: 102 MMOL/L (ref 98–107)
CO2: 28 MMOL/L (ref 20–31)
CREAT SERPL-MCNC: 0.55 MG/DL (ref 0.5–0.9)
DIFFERENTIAL TYPE: ABNORMAL
EOSINOPHILS RELATIVE PERCENT: 2 % (ref 1–4)
GFR AFRICAN AMERICAN: >60 ML/MIN
GFR NON-AFRICAN AMERICAN: >60 ML/MIN
GFR SERPL CREATININE-BSD FRML MDRD: ABNORMAL ML/MIN/{1.73_M2}
GFR SERPL CREATININE-BSD FRML MDRD: ABNORMAL ML/MIN/{1.73_M2}
GLUCOSE BLD-MCNC: 96 MG/DL (ref 70–99)
HCT VFR BLD CALC: 29.2 % (ref 36.3–47.1)
HEMOGLOBIN: 8.8 G/DL (ref 11.9–15.1)
IMMATURE GRANULOCYTES: 0 %
INR BLD: 1 (ref 0.9–1.2)
LYMPHOCYTES # BLD: 13 % (ref 24–43)
MAGNESIUM: 2.5 MG/DL (ref 1.6–2.6)
MCH RBC QN AUTO: 25.1 PG (ref 25.2–33.5)
MCHC RBC AUTO-ENTMCNC: 30.1 G/DL (ref 28.4–34.8)
MCV RBC AUTO: 83.2 FL (ref 82.6–102.9)
MONOCYTES # BLD: 6 % (ref 3–12)
MORPHOLOGY: ABNORMAL
NRBC AUTOMATED: 0 PER 100 WBC
PDW BLD-RTO: 21.2 % (ref 11.8–14.4)
PLATELET # BLD: 211 K/UL (ref 138–453)
PLATELET ESTIMATE: ABNORMAL
PMV BLD AUTO: 9.2 FL (ref 8.1–13.5)
POTASSIUM SERPL-SCNC: 3.9 MMOL/L (ref 3.7–5.3)
PROTHROMBIN TIME: 10.8 SEC (ref 9.7–12.2)
RBC # BLD: 3.51 M/UL (ref 3.95–5.11)
RBC # BLD: ABNORMAL 10*6/UL
SEG NEUTROPHILS: 79 % (ref 36–65)
SEGMENTED NEUTROPHILS ABSOLUTE COUNT: 6.79 K/UL (ref 1.5–8.1)
SODIUM BLD-SCNC: 136 MMOL/L (ref 135–144)
TOTAL PROTEIN: 5.5 G/DL (ref 6.4–8.3)
WBC # BLD: 8.6 K/UL (ref 3.5–11.3)
WBC # BLD: ABNORMAL 10*3/UL

## 2018-10-06 PROCEDURE — 6360000002 HC RX W HCPCS: Performed by: FAMILY MEDICINE

## 2018-10-06 PROCEDURE — 2000000000 HC ICU R&B

## 2018-10-06 PROCEDURE — 94762 N-INVAS EAR/PLS OXIMTRY CONT: CPT

## 2018-10-06 PROCEDURE — 94640 AIRWAY INHALATION TREATMENT: CPT

## 2018-10-06 PROCEDURE — 85610 PROTHROMBIN TIME: CPT

## 2018-10-06 PROCEDURE — 94664 DEMO&/EVAL PT USE INHALER: CPT

## 2018-10-06 PROCEDURE — 36415 COLL VENOUS BLD VENIPUNCTURE: CPT

## 2018-10-06 PROCEDURE — 94668 MNPJ CHEST WALL SBSQ: CPT

## 2018-10-06 PROCEDURE — 6370000000 HC RX 637 (ALT 250 FOR IP): Performed by: FAMILY MEDICINE

## 2018-10-06 PROCEDURE — 80053 COMPREHEN METABOLIC PANEL: CPT

## 2018-10-06 PROCEDURE — 83735 ASSAY OF MAGNESIUM: CPT

## 2018-10-06 PROCEDURE — 2580000003 HC RX 258: Performed by: FAMILY MEDICINE

## 2018-10-06 PROCEDURE — 85025 COMPLETE CBC W/AUTO DIFF WBC: CPT

## 2018-10-06 PROCEDURE — 94761 N-INVAS EAR/PLS OXIMETRY MLT: CPT

## 2018-10-06 RX ORDER — HYDROCODONE BITARTRATE AND ACETAMINOPHEN 10; 325 MG/1; MG/1
1 TABLET ORAL EVERY 6 HOURS PRN
Status: DISCONTINUED | OUTPATIENT
Start: 2018-10-06 | End: 2018-10-08 | Stop reason: HOSPADM

## 2018-10-06 RX ORDER — 0.9 % SODIUM CHLORIDE 0.9 %
500 INTRAVENOUS SOLUTION INTRAVENOUS ONCE
Status: COMPLETED | OUTPATIENT
Start: 2018-10-06 | End: 2018-10-06

## 2018-10-06 RX ADMIN — TRAZODONE HYDROCHLORIDE 200 MG: 50 TABLET ORAL at 22:45

## 2018-10-06 RX ADMIN — IPRATROPIUM BROMIDE AND ALBUTEROL SULFATE 1 AMPULE: .5; 3 SOLUTION RESPIRATORY (INHALATION) at 11:03

## 2018-10-06 RX ADMIN — POTASSIUM CHLORIDE 40 MEQ: 20 TABLET, EXTENDED RELEASE ORAL at 08:55

## 2018-10-06 RX ADMIN — PREGABALIN 300 MG: 75 CAPSULE ORAL at 20:04

## 2018-10-06 RX ADMIN — SODIUM CHLORIDE: 9 INJECTION, SOLUTION INTRAVENOUS at 22:45

## 2018-10-06 RX ADMIN — AZITHROMYCIN MONOHYDRATE 500 MG: 500 INJECTION, POWDER, LYOPHILIZED, FOR SOLUTION INTRAVENOUS at 09:01

## 2018-10-06 RX ADMIN — SODIUM CHLORIDE: 9 INJECTION, SOLUTION INTRAVENOUS at 07:25

## 2018-10-06 RX ADMIN — ENOXAPARIN SODIUM 40 MG: 40 INJECTION SUBCUTANEOUS at 08:56

## 2018-10-06 RX ADMIN — IPRATROPIUM BROMIDE AND ALBUTEROL SULFATE 1 AMPULE: .5; 3 SOLUTION RESPIRATORY (INHALATION) at 17:08

## 2018-10-06 RX ADMIN — SODIUM CHLORIDE 500 ML: 9 INJECTION, SOLUTION INTRAVENOUS at 01:22

## 2018-10-06 RX ADMIN — HYDROCODONE BITARTRATE AND ACETAMINOPHEN 1 TABLET: 10; 325 TABLET ORAL at 15:29

## 2018-10-06 RX ADMIN — CEFTRIAXONE 1 G: 1 INJECTION, POWDER, FOR SOLUTION INTRAMUSCULAR; INTRAVENOUS at 08:56

## 2018-10-06 RX ADMIN — ASPIRIN 81 MG: 81 TABLET, COATED ORAL at 08:56

## 2018-10-06 RX ADMIN — ALBUTEROL SULFATE 2.5 MG: 2.5 SOLUTION RESPIRATORY (INHALATION) at 03:06

## 2018-10-06 RX ADMIN — IPRATROPIUM BROMIDE AND ALBUTEROL SULFATE 1 AMPULE: .5; 3 SOLUTION RESPIRATORY (INHALATION) at 07:48

## 2018-10-06 RX ADMIN — Medication 1 TABLET: at 17:18

## 2018-10-06 RX ADMIN — LEVOTHYROXINE SODIUM 150 MCG: 150 TABLET ORAL at 07:29

## 2018-10-06 RX ADMIN — POTASSIUM CHLORIDE 40 MEQ: 20 TABLET, EXTENDED RELEASE ORAL at 20:04

## 2018-10-06 RX ADMIN — PANTOPRAZOLE SODIUM 40 MG: 40 TABLET, DELAYED RELEASE ORAL at 07:29

## 2018-10-06 RX ADMIN — Medication 1 TABLET: at 08:56

## 2018-10-06 RX ADMIN — SODIUM CHLORIDE: 9 INJECTION, SOLUTION INTRAVENOUS at 14:52

## 2018-10-06 RX ADMIN — HYDROCODONE BITARTRATE AND ACETAMINOPHEN 1 TABLET: 10; 325 TABLET ORAL at 21:04

## 2018-10-06 RX ADMIN — HYDROCODONE BITARTRATE AND ACETAMINOPHEN 1 TABLET: 10; 325 TABLET ORAL at 07:29

## 2018-10-06 RX ADMIN — DULOXETINE HYDROCHLORIDE 60 MG: 60 CAPSULE, DELAYED RELEASE ORAL at 08:55

## 2018-10-06 RX ADMIN — PANTOPRAZOLE SODIUM 40 MG: 40 TABLET, DELAYED RELEASE ORAL at 15:29

## 2018-10-06 RX ADMIN — IPRATROPIUM BROMIDE AND ALBUTEROL SULFATE 1 AMPULE: .5; 3 SOLUTION RESPIRATORY (INHALATION) at 21:32

## 2018-10-06 RX ADMIN — Medication 10 ML: at 08:57

## 2018-10-06 RX ADMIN — PREGABALIN 300 MG: 75 CAPSULE ORAL at 08:56

## 2018-10-06 ASSESSMENT — PAIN DESCRIPTION - ORIENTATION
ORIENTATION: RIGHT;MID
ORIENTATION: RIGHT;MID
ORIENTATION: MID;LOWER
ORIENTATION: RIGHT;MID
ORIENTATION: MID;LOWER;RIGHT

## 2018-10-06 ASSESSMENT — PAIN DESCRIPTION - LOCATION
LOCATION: BACK;RIB CAGE
LOCATION: BACK

## 2018-10-06 ASSESSMENT — PAIN DESCRIPTION - PAIN TYPE
TYPE: CHRONIC PAIN

## 2018-10-06 ASSESSMENT — PAIN DESCRIPTION - ONSET
ONSET: ON-GOING

## 2018-10-06 ASSESSMENT — PAIN DESCRIPTION - FREQUENCY
FREQUENCY: CONTINUOUS
FREQUENCY: INTERMITTENT

## 2018-10-06 ASSESSMENT — PAIN SCALES - GENERAL
PAINLEVEL_OUTOF10: 7
PAINLEVEL_OUTOF10: 7
PAINLEVEL_OUTOF10: 4
PAINLEVEL_OUTOF10: 5
PAINLEVEL_OUTOF10: 6
PAINLEVEL_OUTOF10: 4
PAINLEVEL_OUTOF10: 4
PAINLEVEL_OUTOF10: 6
PAINLEVEL_OUTOF10: 6

## 2018-10-06 ASSESSMENT — PAIN DESCRIPTION - DESCRIPTORS
DESCRIPTORS: DISCOMFORT
DESCRIPTORS: ACHING

## 2018-10-06 NOTE — PROGRESS NOTES
RESPIRATORY ASSESSMENT PROTOCOL                                                                                              Patient Name: Heidi Dupree Room#: [id number] : 1945     Admitting diagnosis: Pneumonia of right upper lobe due to infectious organism [facility]) [J18.1]       Medical History:   Past Medical History:   Diagnosis Date    Depression     GERD (gastroesophageal reflux disease)     Osteoporosis        PATIENT ASSESSMENT    LABORATORY DATA  Hematology:   Lab Results   Component Value Date    WBC 8.6 10/06/2018    RBC 3.51 10/06/2018    HGB 8.8 10/06/2018    HCT 29.2 10/06/2018     10/06/2018     Chemistry:  No results found for: PHART, WGG3XRV, PO2ART, R9JFZKOL, CZZ9WPK, PBEA    Blood Culture:   Sputum Culture:     VITALS  Pulse: 74   Resp: 22  BP: 132/82  SpO2: 98 % O2 Device: Nasal cannula (removed oxygen, see note.)  Temp: 98.2 °F (36.8 °C)  Comment:   SKIN COLOR  [] Normal  [] Pale  [] Dusky  [] Cyanotic      RESPIRATORY PATTERN  [] Normal  [] Dyspnea  [] Cheyne-Granger  [] Kussmaul  [] Biots  AMBULATORY  [] Yes  [] No  [] With Assistance    PEAK FLOW  Predicted:     Personal Best:        VITAL CAPACITY  Predicted value:  ml  Actual Value:  ml  30% of Predicted:  ml  Patient Acuity 0 1 2 3 4 Score   Level of Concious (LOC) [x]  Alert & Oriented or Pt normal LOC []  Confused;follows directions []  Confused & uncooper-ative []  Obtunded []  Comatose 0   Respiratory Rate  (RR) []  Reg. rate & pattern. 12 - 20 bpm  []  Increased RR. Greater than 20 bpm   [x]  SOB w/ exertion or RR greater than 24 bpm []  Access- ory muscle use at rest. Abn.  resp. []  SOB at rest.   2   Bilateral Breath Sounds (BBS) []  Clear []  Diminish-ed bases  [x]  Diminish-ed t/o, or rales   []  Sporadic, scattered wheezes or rhonchi []  Persistentwheezes and, or absent BBS 2   Cough [x]  Strong, effective, & non-prod. []  Effective & prod.  Less than 25 ml (2 TBSP) over past 24 hrs []  Ineffective & non-prod

## 2018-10-06 NOTE — PROGRESS NOTES
Pt resting in bed, c/o soreness and pain to back and right side, rib cage, see mar. Pt right hand swollen, pulses strong and equal, right arm and hand elevated on pillow, will continue to monitor. Pt refused to get up to chair at this time, and refused bath at this time.

## 2018-10-07 LAB
ABSOLUTE EOS #: 0.13 K/UL (ref 0–0.44)
ABSOLUTE IMMATURE GRANULOCYTE: 0 K/UL (ref 0–0.3)
ABSOLUTE LYMPH #: 1.19 K/UL (ref 1.1–3.7)
ABSOLUTE MONO #: 0.44 K/UL (ref 0.1–1.2)
ALBUMIN SERPL-MCNC: 3.1 G/DL (ref 3.5–5.2)
ALBUMIN/GLOBULIN RATIO: 1.1 (ref 1–2.5)
ALP BLD-CCNC: 61 U/L (ref 35–104)
ALT SERPL-CCNC: 10 U/L (ref 5–33)
ANION GAP SERPL CALCULATED.3IONS-SCNC: 7 MMOL/L (ref 9–17)
AST SERPL-CCNC: 13 U/L
BASOPHILS # BLD: 1 % (ref 0–2)
BASOPHILS ABSOLUTE: 0.04 K/UL (ref 0–0.2)
BILIRUB SERPL-MCNC: 0.27 MG/DL (ref 0.3–1.2)
BUN BLDV-MCNC: 6 MG/DL (ref 8–23)
BUN/CREAT BLD: 12 (ref 9–20)
CALCIUM SERPL-MCNC: 8.6 MG/DL (ref 8.6–10.4)
CHLORIDE BLD-SCNC: 108 MMOL/L (ref 98–107)
CO2: 23 MMOL/L (ref 20–31)
CREAT SERPL-MCNC: 0.49 MG/DL (ref 0.5–0.9)
DIFFERENTIAL TYPE: ABNORMAL
EOSINOPHILS RELATIVE PERCENT: 3 % (ref 1–4)
GFR AFRICAN AMERICAN: >60 ML/MIN
GFR NON-AFRICAN AMERICAN: >60 ML/MIN
GFR SERPL CREATININE-BSD FRML MDRD: ABNORMAL ML/MIN/{1.73_M2}
GFR SERPL CREATININE-BSD FRML MDRD: ABNORMAL ML/MIN/{1.73_M2}
GLUCOSE BLD-MCNC: 96 MG/DL (ref 70–99)
HCT VFR BLD CALC: 30.2 % (ref 36.3–47.1)
HEMOGLOBIN: 9.2 G/DL (ref 11.9–15.1)
IMMATURE GRANULOCYTES: 0 %
INR BLD: 1 (ref 0.9–1.2)
LYMPHOCYTES # BLD: 27 % (ref 24–43)
MAGNESIUM: 2.4 MG/DL (ref 1.6–2.6)
MCH RBC QN AUTO: 25.3 PG (ref 25.2–33.5)
MCHC RBC AUTO-ENTMCNC: 30.5 G/DL (ref 28.4–34.8)
MCV RBC AUTO: 83.2 FL (ref 82.6–102.9)
MONOCYTES # BLD: 10 % (ref 3–12)
MORPHOLOGY: NORMAL
NRBC AUTOMATED: 0 PER 100 WBC
PDW BLD-RTO: 21.2 % (ref 11.8–14.4)
PLATELET # BLD: 218 K/UL (ref 138–453)
PLATELET ESTIMATE: ABNORMAL
PMV BLD AUTO: 9.1 FL (ref 8.1–13.5)
POTASSIUM SERPL-SCNC: 4.1 MMOL/L (ref 3.7–5.3)
PROTHROMBIN TIME: 10 SEC (ref 9.7–12.2)
RBC # BLD: 3.63 M/UL (ref 3.95–5.11)
RBC # BLD: ABNORMAL 10*6/UL
SEG NEUTROPHILS: 59 % (ref 36–65)
SEGMENTED NEUTROPHILS ABSOLUTE COUNT: 2.6 K/UL (ref 1.5–8.1)
SODIUM BLD-SCNC: 138 MMOL/L (ref 135–144)
TOTAL PROTEIN: 6 G/DL (ref 6.4–8.3)
WBC # BLD: 4.4 K/UL (ref 3.5–11.3)
WBC # BLD: ABNORMAL 10*3/UL

## 2018-10-07 PROCEDURE — 6370000000 HC RX 637 (ALT 250 FOR IP): Performed by: FAMILY MEDICINE

## 2018-10-07 PROCEDURE — 6360000002 HC RX W HCPCS: Performed by: FAMILY MEDICINE

## 2018-10-07 PROCEDURE — 85025 COMPLETE CBC W/AUTO DIFF WBC: CPT

## 2018-10-07 PROCEDURE — 80053 COMPREHEN METABOLIC PANEL: CPT

## 2018-10-07 PROCEDURE — 2580000003 HC RX 258: Performed by: FAMILY MEDICINE

## 2018-10-07 PROCEDURE — 94664 DEMO&/EVAL PT USE INHALER: CPT

## 2018-10-07 PROCEDURE — 83735 ASSAY OF MAGNESIUM: CPT

## 2018-10-07 PROCEDURE — 85610 PROTHROMBIN TIME: CPT

## 2018-10-07 PROCEDURE — 36415 COLL VENOUS BLD VENIPUNCTURE: CPT

## 2018-10-07 PROCEDURE — 1200000000 HC SEMI PRIVATE

## 2018-10-07 PROCEDURE — 94640 AIRWAY INHALATION TREATMENT: CPT

## 2018-10-07 RX ADMIN — PREGABALIN 300 MG: 75 CAPSULE ORAL at 20:27

## 2018-10-07 RX ADMIN — HYDROCODONE BITARTRATE AND ACETAMINOPHEN 1 TABLET: 10; 325 TABLET ORAL at 16:29

## 2018-10-07 RX ADMIN — POTASSIUM CHLORIDE 40 MEQ: 20 TABLET, EXTENDED RELEASE ORAL at 08:20

## 2018-10-07 RX ADMIN — ENOXAPARIN SODIUM 40 MG: 40 INJECTION SUBCUTANEOUS at 08:20

## 2018-10-07 RX ADMIN — CEFTRIAXONE 1 G: 1 INJECTION, POWDER, FOR SOLUTION INTRAMUSCULAR; INTRAVENOUS at 08:20

## 2018-10-07 RX ADMIN — PANTOPRAZOLE SODIUM 40 MG: 40 TABLET, DELAYED RELEASE ORAL at 06:52

## 2018-10-07 RX ADMIN — Medication 1 TABLET: at 08:20

## 2018-10-07 RX ADMIN — Medication 1 TABLET: at 16:11

## 2018-10-07 RX ADMIN — IPRATROPIUM BROMIDE AND ALBUTEROL SULFATE 1 AMPULE: .5; 3 SOLUTION RESPIRATORY (INHALATION) at 09:00

## 2018-10-07 RX ADMIN — HYDROCODONE BITARTRATE AND ACETAMINOPHEN 1 TABLET: 10; 325 TABLET ORAL at 11:06

## 2018-10-07 RX ADMIN — SODIUM CHLORIDE: 9 INJECTION, SOLUTION INTRAVENOUS at 07:25

## 2018-10-07 RX ADMIN — SODIUM CHLORIDE: 9 INJECTION, SOLUTION INTRAVENOUS at 22:03

## 2018-10-07 RX ADMIN — IPRATROPIUM BROMIDE AND ALBUTEROL SULFATE 1 AMPULE: .5; 3 SOLUTION RESPIRATORY (INHALATION) at 22:35

## 2018-10-07 RX ADMIN — PANTOPRAZOLE SODIUM 40 MG: 40 TABLET, DELAYED RELEASE ORAL at 16:11

## 2018-10-07 RX ADMIN — LEVOTHYROXINE SODIUM 150 MCG: 150 TABLET ORAL at 06:52

## 2018-10-07 RX ADMIN — AZITHROMYCIN MONOHYDRATE 500 MG: 500 INJECTION, POWDER, LYOPHILIZED, FOR SOLUTION INTRAVENOUS at 08:28

## 2018-10-07 RX ADMIN — HYDROCODONE BITARTRATE AND ACETAMINOPHEN 1 TABLET: 10; 325 TABLET ORAL at 23:37

## 2018-10-07 RX ADMIN — POTASSIUM CHLORIDE 40 MEQ: 20 TABLET, EXTENDED RELEASE ORAL at 20:27

## 2018-10-07 RX ADMIN — TRAZODONE HYDROCHLORIDE 200 MG: 50 TABLET ORAL at 20:27

## 2018-10-07 RX ADMIN — PREGABALIN 300 MG: 75 CAPSULE ORAL at 08:20

## 2018-10-07 RX ADMIN — ASPIRIN 81 MG: 81 TABLET, COATED ORAL at 08:20

## 2018-10-07 RX ADMIN — HYDROCODONE BITARTRATE AND ACETAMINOPHEN 1 TABLET: 10; 325 TABLET ORAL at 04:20

## 2018-10-07 RX ADMIN — Medication 10 ML: at 08:21

## 2018-10-07 RX ADMIN — ALBUTEROL SULFATE 2.5 MG: 2.5 SOLUTION RESPIRATORY (INHALATION) at 04:27

## 2018-10-07 RX ADMIN — DULOXETINE HYDROCHLORIDE 60 MG: 60 CAPSULE, DELAYED RELEASE ORAL at 08:20

## 2018-10-07 ASSESSMENT — PAIN DESCRIPTION - DESCRIPTORS
DESCRIPTORS: ACHING;DISCOMFORT
DESCRIPTORS: DISCOMFORT;ACHING
DESCRIPTORS: DISCOMFORT
DESCRIPTORS: ACHING
DESCRIPTORS: ACHING
DESCRIPTORS: DISCOMFORT

## 2018-10-07 ASSESSMENT — PAIN SCALES - GENERAL
PAINLEVEL_OUTOF10: 6
PAINLEVEL_OUTOF10: 3
PAINLEVEL_OUTOF10: 6
PAINLEVEL_OUTOF10: 0
PAINLEVEL_OUTOF10: 0
PAINLEVEL_OUTOF10: 4
PAINLEVEL_OUTOF10: 3
PAINLEVEL_OUTOF10: 7
PAINLEVEL_OUTOF10: 4
PAINLEVEL_OUTOF10: 7
PAINLEVEL_OUTOF10: 7

## 2018-10-07 ASSESSMENT — PAIN DESCRIPTION - ORIENTATION
ORIENTATION: MID;LOWER

## 2018-10-07 ASSESSMENT — PAIN DESCRIPTION - LOCATION
LOCATION: BACK

## 2018-10-07 ASSESSMENT — PAIN DESCRIPTION - FREQUENCY
FREQUENCY: INTERMITTENT

## 2018-10-07 ASSESSMENT — PAIN DESCRIPTION - PAIN TYPE
TYPE: CHRONIC PAIN

## 2018-10-07 ASSESSMENT — PAIN DESCRIPTION - ONSET
ONSET: ON-GOING

## 2018-10-07 NOTE — PROGRESS NOTES
Progress Note    SUBJECTIVE: Patient is seen for Principal Problem:    Pneumonia of right upper lobe due to infectious organism [facility])  Active Problems:    Pneumonia of right upper lobe due to Chlamydia species    Acute metabolic encephalopathy    Hypothyroidism    Major depressive disorder    Chronic midline low back pain without sciatica    Iron deficiency anemia  Resolved Problems:    * No resolved hospital problems. *     feelin better,cough better  painbetter  bp better  heartrate lower    OBJECTIVE:    Vitals:   Vitals:    10/07/18 0900   BP: 120/73   Pulse: 96   Resp: 20   Temp:    SpO2:      Weight: 159 lb 14.4 oz (72.5 kg)   Height: 5' 5\" (165.1 cm)   -----------------------------------------------------------------  Exam:    CONSTITUTIONAL:  awake, alert, cooperative, no apparent distress, and appears stated age  EYES:  Lids and lashes normal, pupils equal, round and reactive to light, extra ocular muscles intact, sclera clear, conjunctiva normal  ENT:  Normocephalic, without obvious abnormality, atraumatic, sinuses nontender on palpation, external ears without lesions, oral pharynx with moist mucus membranes, tonsils without erythema or exudates, gums normal and good dentition.   NECK:  Supple, symmetrical, trachea midline, no adenopathy, thyroid symmetric, not enlarged and no tenderness, skin normal  HEMATOLOGIC/LYMPHATICS:  no cervical lymphadenopathy  LUNGS:  Has few scattered rhonchi,no wheezing or retractions  CARDIOVASCULAR:  Sinus tachycardia,no gallop  ABDOMEN:   normal bowel sounds, soft, non-distended, non-tender, no masses palpated, no hepatosplenomegally    MUSCULOSKELETAL:  there is no redness, warmth, or swelling of the joints  NEUROLOGIC:  No motor or sensory defict  SKIN:  no bruising or bleeding  EXT:     no cyanosis, clubbing or edema present    -----------------------------------------------------------------  Diagnostic Data: Reviewed    More Recent Labs:    Results for orders placed or 96 70 - 99 mg/dL    BUN 6 (L) 8 - 23 mg/dL    CREATININE 0.49 (L) 0.50 - 0.90 mg/dL    Bun/Cre Ratio 12 9 - 20    Calcium 8.6 8.6 - 10.4 mg/dL    Sodium 138 135 - 144 mmol/L    Potassium 4.1 3.7 - 5.3 mmol/L    Chloride 108 (H) 98 - 107 mmol/L    CO2 23 20 - 31 mmol/L    Anion Gap 7 (L) 9 - 17 mmol/L    Alkaline Phosphatase 61 35 - 104 U/L    ALT 10 5 - 33 U/L    AST 13 <32 U/L    Total Bilirubin 0.27 (L) 0.3 - 1.2 mg/dL    Total Protein 6.0 (L) 6.4 - 8.3 g/dL    Alb 3.1 (L) 3.5 - 5.2 g/dL    Albumin/Globulin Ratio 1.1 1.0 - 2.5    GFR Non-African American >60 >60 mL/min    GFR African American >60 >60 mL/min    GFR Comment          GFR Staging         EKG 12 Lead   Result Value Ref Range    Ventricular Rate 121 BPM    Atrial Rate 121 BPM    P-R Interval 158 ms    QRS Duration 74 ms    Q-T Interval 318 ms    QTc Calculation (Bazett) 451 ms    P Axis 59 degrees    R Axis -18 degrees    T Axis 77 degrees       ASSESSMENT:   Patient Active Problem List    Diagnosis Date Noted    Pneumonia of right upper lobe due to Chlamydia species 10/05/2018     Priority: High     Class: Acute    Acute metabolic encephalopathy 26/87/3703     Priority: High     Class: Acute    Pneumonia of right upper lobe due to infectious organism ([facility] [address] 10/05/2018    Hypothyroidism 10/05/2018    Major depressive disorder 10/05/2018    Chronic midline low back pain without sciatica 10/05/2018    Iron deficiency anemia 10/05/2018         PLAN:  · Iv to kvo  · Transfer to [facility] on monitored bed  · Continue antibiotics and aerosols  · cxr in am      Horace Angela M.D.

## 2018-10-07 NOTE — PLAN OF CARE
Problem: Pain:  Goal: Pain level will decrease  Pain level will decrease   Outcome: Ongoing  Pt able to express pain with 0-10 pain scale, will continue to monitor.

## 2018-10-07 NOTE — PROGRESS NOTES
Patient woke up  and stated she was very stiff. Patient ambulated in room at this time and to bathroom. Patient returns to chair. Tolerated fairly well.

## 2018-10-07 NOTE — PROGRESS NOTES
RESPIRATORY ASSESSMENT PROTOCOL                                                                                              Patient Name: Guilherme Garcia Room#: [id number] : 1945     Admitting diagnosis: Pneumonia of right upper lobe due to infectious organism [facility]) [J18.1]       Medical History:   Past Medical History:   Diagnosis Date    Depression     GERD (gastroesophageal reflux disease)     Osteoporosis        PATIENT ASSESSMENT    LABORATORY DATA  Hematology:   Lab Results   Component Value Date    WBC 8.6 10/06/2018    RBC 3.51 10/06/2018    HGB 8.8 10/06/2018    HCT 29.2 10/06/2018     10/06/2018     Chemistry:  No results found for: PHART, WTX9ZCI, PO2ART, X4JUPVCL, WQD7VPN, PBEA    Blood Culture:   Sputum Culture:     VITALS  Pulse: 78   Resp: 18  BP: 113/72  SpO2: 93 % O2 Device: None (Room air)  Temp: 98.8 °F (37.1 °C)  Comment:     SKIN COLOR  [x] Normal  [] Pale  [] Dusky  [] Cyanotic      RESPIRATORY PATTERN  [] Normal  [x] Dyspnea  [] Cheyne-Granger  [] Kussmaul  [] Biots    AMBULATORY  [] Yes  [] No  [x] With Assistance    PEAK FLOW  Predicted:     Personal Best:        VITAL CAPACITY  Predicted value:  ml  Actual Value:  ml  30% of Predicted:  Ml    Patient Acuity 0 1 2 3 4 Score   Level of Concious (LOC) [x]  Alert & Oriented or Pt normal LOC []  Confused;follows directions []  Confused & uncooper-ative []  Obtunded []  Comatose 0   Respiratory Rate  (RR) []  Reg. rate & pattern. 12 - 20 bpm  []  Increased RR. Greater than 20 bpm   [x]  SOB w/ exertion or RR greater than 24 bpm []  Access- ory muscle use at rest. Abn.  resp. []  SOB at rest.   2   Bilateral Breath Sounds (BBS) []  Clear []  Diminish-ed bases  [x]  Diminish-ed t/o, or rales   []  Sporadic, scattered wheezes or rhonchi []  Persistentwheezes and, or absent BBS 2   Cough [x]  Strong, effective, & non-prod. []  Effective & prod.  Less than 25 ml (2 TBSP) over past 24 hrs []  Ineffective & non-prod to less than 25 physician if condition deteriorates. MDI THERAPY with  2 actuations of [physician-ordered bronchodilator(s)] via spacer TID Albuterol and PRNq4 hrs. If unable to utilize MDI: HHN [physician-ordered bronchodilator(s)] TID and Albuterol PRN q4 hrs. Notify physician if condition deteriorates. MDI THERAPY with  [physician-ordered bronchodilator(s)] via spacer TID PRN. If unable to utilize MDI: HHN [physician-ordered bronchodilator(s)] TID PRN. Notify physician if condition deteriorates. If Acuity Level is 2, 3, or 4 in any of the following:    [] COUGH     [] SURGICAL HISTORY (SURG HX)  [x] CHEST XRAY (CXR)    Goal: Improvement in sputum mobilization in patients with ineffective airway clearance. Reverse atelectasis. [x] Bronchopulmonary Hygiene Protocol    Total Acuity:   16-32  []  Secondary Assessment in 24 hrs Total Acuity:  9-15  [x]  Secondary Assessment in 24 hrs Total Acuity:  4-8  []  Secondary Assessment in 48 hrs Total Acuity:  0-3  []  Secondary Assessment in 72 hrs   METANEB QID with [physician-ordered bronchodilator(s)] if CXR Acuity = 4; otherwise:  PD&P, PEP, or Vest QID & PRN  NT Sxn PRN for ineffective cough  METANEB QID with [physician-ordered bronchodilator(s)] if CXR Acuity = 4; otherwise:  PD&P, PEP, or Vest TID & PRN  NT Sxn PRN for ineffective cough  Instruct patient to self-perform IS q1hr WA   Directed Cough self-performed q1hr WA     If Acuity Level is 2 or above in the following:    [] PULMONARY HISTORY (PULM HX)    Goal: Assist patient in quitting smoking to slow or stop the progression of lung disease.     [] Smoking Cessation Protocol    SMOKING CESSATION EDUCATION provided according to policy [id number]_568: (marlyn with an X)  ____Yes    ____ No     ____ NA    Smoking Cessation Booklet given:  ____Yes  ____No ____Patient Glenny Alvarez

## 2018-10-08 ENCOUNTER — APPOINTMENT (OUTPATIENT)
Dept: GENERAL RADIOLOGY | Age: 73
DRG: 193 | End: 2018-10-08
Payer: MEDICARE

## 2018-10-08 VITALS
OXYGEN SATURATION: 95 % | HEART RATE: 68 BPM | SYSTOLIC BLOOD PRESSURE: 149 MMHG | DIASTOLIC BLOOD PRESSURE: 54 MMHG | HEIGHT: 65 IN | BODY MASS INDEX: 26.66 KG/M2 | TEMPERATURE: 97.9 F | WEIGHT: 160 LBS | RESPIRATION RATE: 18 BRPM

## 2018-10-08 LAB
ABSOLUTE EOS #: 0.2 K/UL (ref 0–0.44)
ABSOLUTE IMMATURE GRANULOCYTE: 0 K/UL (ref 0–0.3)
ABSOLUTE LYMPH #: 1.01 K/UL (ref 1.1–3.7)
ABSOLUTE MONO #: 0.39 K/UL (ref 0.1–1.2)
ALBUMIN SERPL-MCNC: 3.1 G/DL (ref 3.5–5.2)
ALBUMIN/GLOBULIN RATIO: 1 (ref 1–2.5)
ALP BLD-CCNC: 65 U/L (ref 35–104)
ALT SERPL-CCNC: 10 U/L (ref 5–33)
ANION GAP SERPL CALCULATED.3IONS-SCNC: 7 MMOL/L (ref 9–17)
AST SERPL-CCNC: 13 U/L
BASOPHILS # BLD: 1 % (ref 0–2)
BASOPHILS ABSOLUTE: 0.04 K/UL (ref 0–0.2)
BILIRUB SERPL-MCNC: 0.24 MG/DL (ref 0.3–1.2)
BUN BLDV-MCNC: 5 MG/DL (ref 8–23)
BUN/CREAT BLD: 11 (ref 9–20)
CALCIUM SERPL-MCNC: 8.9 MG/DL (ref 8.6–10.4)
CHLORIDE BLD-SCNC: 108 MMOL/L (ref 98–107)
CO2: 25 MMOL/L (ref 20–31)
CREAT SERPL-MCNC: 0.44 MG/DL (ref 0.5–0.9)
DIFFERENTIAL TYPE: ABNORMAL
EOSINOPHILS RELATIVE PERCENT: 5 % (ref 1–4)
GFR AFRICAN AMERICAN: >60 ML/MIN
GFR NON-AFRICAN AMERICAN: >60 ML/MIN
GFR SERPL CREATININE-BSD FRML MDRD: ABNORMAL ML/MIN/{1.73_M2}
GFR SERPL CREATININE-BSD FRML MDRD: ABNORMAL ML/MIN/{1.73_M2}
GLUCOSE BLD-MCNC: 84 MG/DL (ref 70–99)
HCT VFR BLD CALC: 30.1 % (ref 36.3–47.1)
HEMOGLOBIN: 9.2 G/DL (ref 11.9–15.1)
IMMATURE GRANULOCYTES: 0 %
INR BLD: 0.9 (ref 0.9–1.2)
LYMPHOCYTES # BLD: 26 % (ref 24–43)
MAGNESIUM: 2.2 MG/DL (ref 1.6–2.6)
MCH RBC QN AUTO: 24.9 PG (ref 25.2–33.5)
MCHC RBC AUTO-ENTMCNC: 30.6 G/DL (ref 28.4–34.8)
MCV RBC AUTO: 81.4 FL (ref 82.6–102.9)
MONOCYTES # BLD: 10 % (ref 3–12)
MORPHOLOGY: ABNORMAL
NRBC AUTOMATED: 0 PER 100 WBC
PDW BLD-RTO: 21.1 % (ref 11.8–14.4)
PLATELET # BLD: 249 K/UL (ref 138–453)
PLATELET ESTIMATE: ABNORMAL
PMV BLD AUTO: 10 FL (ref 8.1–13.5)
POTASSIUM SERPL-SCNC: 4.1 MMOL/L (ref 3.7–5.3)
PROTHROMBIN TIME: 9.5 SEC (ref 9.7–12.2)
RBC # BLD: 3.7 M/UL (ref 3.95–5.11)
RBC # BLD: ABNORMAL 10*6/UL
SEG NEUTROPHILS: 58 % (ref 36–65)
SEGMENTED NEUTROPHILS ABSOLUTE COUNT: 2.26 K/UL (ref 1.5–8.1)
SODIUM BLD-SCNC: 140 MMOL/L (ref 135–144)
TOTAL PROTEIN: 6.2 G/DL (ref 6.4–8.3)
WBC # BLD: 3.9 K/UL (ref 3.5–11.3)
WBC # BLD: ABNORMAL 10*3/UL

## 2018-10-08 PROCEDURE — 6370000000 HC RX 637 (ALT 250 FOR IP): Performed by: FAMILY MEDICINE

## 2018-10-08 PROCEDURE — 6360000002 HC RX W HCPCS: Performed by: FAMILY MEDICINE

## 2018-10-08 PROCEDURE — 71046 X-RAY EXAM CHEST 2 VIEWS: CPT

## 2018-10-08 PROCEDURE — 80053 COMPREHEN METABOLIC PANEL: CPT

## 2018-10-08 PROCEDURE — 85610 PROTHROMBIN TIME: CPT

## 2018-10-08 PROCEDURE — 85025 COMPLETE CBC W/AUTO DIFF WBC: CPT

## 2018-10-08 PROCEDURE — 94668 MNPJ CHEST WALL SBSQ: CPT

## 2018-10-08 PROCEDURE — 94640 AIRWAY INHALATION TREATMENT: CPT

## 2018-10-08 PROCEDURE — 2580000003 HC RX 258: Performed by: FAMILY MEDICINE

## 2018-10-08 PROCEDURE — 36415 COLL VENOUS BLD VENIPUNCTURE: CPT

## 2018-10-08 PROCEDURE — 83735 ASSAY OF MAGNESIUM: CPT

## 2018-10-08 RX ORDER — CEFUROXIME AXETIL 500 MG/1
500 TABLET ORAL 2 TIMES DAILY
Qty: 14 TABLET | Refills: 0 | Status: SHIPPED | OUTPATIENT
Start: 2018-10-08 | End: 2018-10-15

## 2018-10-08 RX ORDER — ALBUTEROL SULFATE 90 UG/1
2 AEROSOL, METERED RESPIRATORY (INHALATION) 4 TIMES DAILY
Qty: 1 INHALER | Refills: 0 | Status: SHIPPED | OUTPATIENT
Start: 2018-10-08 | End: 2022-03-16 | Stop reason: SDUPTHER

## 2018-10-08 RX ORDER — AZITHROMYCIN 250 MG/1
250 TABLET, FILM COATED ORAL DAILY
Qty: 7 TABLET | Refills: 0 | Status: SHIPPED | OUTPATIENT
Start: 2018-10-08 | End: 2018-10-15

## 2018-10-08 RX ADMIN — LEVOTHYROXINE SODIUM 150 MCG: 150 TABLET ORAL at 08:07

## 2018-10-08 RX ADMIN — HYDROCODONE BITARTRATE AND ACETAMINOPHEN 1 TABLET: 10; 325 TABLET ORAL at 05:33

## 2018-10-08 RX ADMIN — DULOXETINE HYDROCHLORIDE 60 MG: 60 CAPSULE, DELAYED RELEASE ORAL at 08:07

## 2018-10-08 RX ADMIN — ASPIRIN 81 MG: 81 TABLET, COATED ORAL at 08:07

## 2018-10-08 RX ADMIN — CEFTRIAXONE 1 G: 1 INJECTION, POWDER, FOR SOLUTION INTRAMUSCULAR; INTRAVENOUS at 08:06

## 2018-10-08 RX ADMIN — PREGABALIN 300 MG: 75 CAPSULE ORAL at 08:07

## 2018-10-08 RX ADMIN — Medication 1 TABLET: at 08:07

## 2018-10-08 RX ADMIN — AZITHROMYCIN MONOHYDRATE 500 MG: 500 INJECTION, POWDER, LYOPHILIZED, FOR SOLUTION INTRAVENOUS at 08:06

## 2018-10-08 RX ADMIN — POTASSIUM CHLORIDE 40 MEQ: 20 TABLET, EXTENDED RELEASE ORAL at 08:07

## 2018-10-08 RX ADMIN — PANTOPRAZOLE SODIUM 40 MG: 40 TABLET, DELAYED RELEASE ORAL at 08:07

## 2018-10-08 RX ADMIN — ENOXAPARIN SODIUM 40 MG: 40 INJECTION SUBCUTANEOUS at 08:06

## 2018-10-08 RX ADMIN — Medication 10 ML: at 08:08

## 2018-10-08 RX ADMIN — IPRATROPIUM BROMIDE AND ALBUTEROL SULFATE 1 AMPULE: .5; 3 SOLUTION RESPIRATORY (INHALATION) at 07:50

## 2018-10-08 ASSESSMENT — PAIN SCALES - GENERAL
PAINLEVEL_OUTOF10: 7
PAINLEVEL_OUTOF10: 6
PAINLEVEL_OUTOF10: 2
PAINLEVEL_OUTOF10: 7

## 2018-10-08 ASSESSMENT — PAIN DESCRIPTION - LOCATION
LOCATION: BACK
LOCATION: BACK

## 2018-10-08 ASSESSMENT — PAIN DESCRIPTION - ORIENTATION: ORIENTATION: MID;LOWER

## 2018-10-08 ASSESSMENT — PAIN DESCRIPTION - PAIN TYPE
TYPE: CHRONIC PAIN
TYPE: CHRONIC PAIN

## 2018-10-08 ASSESSMENT — PAIN DESCRIPTION - DESCRIPTORS: DESCRIPTORS: ACHING

## 2018-10-08 NOTE — DISCHARGE SUMMARY
BACTERIA, YEAST    Xr Chest Standard (2 Vw)    Result Date: 10/5/2018  EXAMINATION: TWO VIEWS OF THE CHEST 10/5/2018 8:01 am COMPARISON: None. HISTORY: ORDERING SYSTEM PROVIDED HISTORY: cough TECHNOLOGIST PROVIDED HISTORY: cough FINDINGS: Heart size is at the upper limits of normal. Opacities in the right upper lobe and right middle lobe are presumed to represent pneumonia. Recommend follow-up chest radiograph after treatment to ensure resolution. Small hiatal hernia. No pleural effusions. No pneumothorax. Opacities in the right upper lobe and right middle lobe are presumed to represent pneumonia. Recommend follow-up chest radiograph after treatment to ensure resolution. Ct Head Wo Contrast    Result Date: 10/5/2018  EXAMINATION: CT OF THE HEAD WITHOUT CONTRAST  10/5/2018 8:12 am TECHNIQUE: CT of the head was performed without the administration of intravenous contrast. Dose modulation, iterative reconstruction, and/or weight based adjustment of the mA/kV was utilized to reduce the radiation dose to as low as reasonably achievable. COMPARISON: 08/26/2011 HISTORY: ORDERING SYSTEM PROVIDED HISTORY: weakness TECHNOLOGIST PROVIDED HISTORY: FINDINGS: BRAIN/VENTRICLES: There is mild prominence of the ventricles and cortical sulci consistent with cortical volume loss. No midline shift. Basal cisterns are normally outlined. There is periventricular and subcortical white matter discrete and confluent low attenuation, nonspecific, likely representing age-related chronic small vessel ischemic disease. There is no evidence for acute infarct. No acute intra or extra-axial hemorrhage. ORBITS: The visualized portion of the orbits demonstrate no acute abnormality. SINUSES: The visualized paranasal sinuses and mastoid air cells demonstrate no acute abnormality. SOFT TISSUES/SKULL:  No acute abnormality of the visualized skull or soft tissues. No acute intracranial abnormality.  Mild cerebral atrophy and chronic small mouth 2 times daily (before meals)             potassium chloride (KLOR-CON) 20 MEQ packet  Take 20 mEq by mouth 2 times daily             pregabalin (LYRICA) 100 MG capsule  Take 300 mg by mouth 2 times daily. .             traZODone (DESYREL) 100 MG tablet  Take 200 mg by mouth nightly                  Patient Instructions:    Activity: activity as tolerated  Diet: regular diet  Wound Care: none needed  Other: none     Disposition:   Discharge to Home    Follow up:  Patient will be followed by Annmarie Muñoz MD in 1 week    CORE MEASURES on Discharge (if applicable)  ACE/ARB in CHF: NA  Statin in MI: NA  ASA in [place]: NA  Statin in CVA: NA  Antiplatelet in CVA: NA    Total time spent on discharge services: 25 minutes    Including the following activities:  Evaluation and Management of patient  Discussion with patient and/or surrogate about current care plan  Coordination with Case Management and/or   Coordination of care with Consultants (if applicable)   Coordination of care with Receiving Facility Physician (if applicable)  Completion of DME forms (if applicable)  Preparation of Discharge Summary  Preparation of Medication Reconciliation  Preparation of Discharge Prescriptions    Signed:  Dirk Hart PA-C  10/8/2018, 11:17 AM

## 2018-10-08 NOTE — PROGRESS NOTES
Hospitalist Progress Note    SUBJECTIVE/INTERVAL HISTORY:    Patient seen in follow up for CAP, metabolic encephalopathy. Wants to go home. Has cough. SOB resolved. NO new complaints. On RA. Has appointment for pain pump refill tomorrow. XR CHEST STANDARD (2 VW) [[id number]] Collected: 10/08/18 0857   Updated: 10/08/18 0914    Specimen Type: Chest    Narrative:     EXAMINATION:  TWO VIEWS OF THE CHEST    10/8/2018 8:56 am    COMPARISON:  Two-view chest from 10/5/18    HISTORY:  ORDERING SYSTEM PROVIDED HISTORY: recheck  TECHNOLOGIST PROVIDED HISTORY:  recheck    Additional history of osteoporosis and GERD. FINDINGS:  Overlying ECG monitor leads, post left shoulder replacement and neck surgery  hardware, similar by comparison. Slight interval decrease airspace opacity density right lung, but no other  interval change.  No new pulmonary abnormality or significant pleural  effusion.  No pneumothorax. Bones and soft tissues stable. Impression:     Slight interval improvement presumed right-sided pneumonia.  No other  interval change. OBJECTIVE:    Vitals:   Temp: 97.9 °F (36.6 °C)  BP: (!) 149/54  Resp: 18  Pulse: 76  SpO2: 95 %  24HR INTAKE/OUTPUT:      Intake/Output Summary (Last 24 hours) at 10/08/18 1045  Last data filed at 10/08/18 0910   Gross per 24 hour   Intake          3433.92 ml   Output             1200 ml   Net          2233.92 ml       -----------------------------------------------------------------  Review of Systems:  Constitutional:negative  for fevers, and negative for chills.   Eyes: negative for visual disturbance   ENT: negative for sore throat, negative nasal congestion, and negative for earache  Respiratory: negative for shortness of breath, negative for cough, and negative for wheezing  Cardiovascular: negative for chest pain, negative for palpitations, and negative for syncope  Gastrointestinal: negative for abdominal pain, negative for nausea,negative for vomiting,

## 2018-10-08 NOTE — PROGRESS NOTES
Patient has an appt at [facility] for pain pump refill tomorrow at 10am. She is very anxious about being discharged, stating 'I have t go home today'

## 2018-10-10 LAB
CULTURE: NORMAL
CULTURE: NORMAL
Lab: NORMAL
Lab: NORMAL
SPECIMEN DESCRIPTION: NORMAL
SPECIMEN DESCRIPTION: NORMAL
STATUS: NORMAL
STATUS: NORMAL

## 2018-12-17 ENCOUNTER — HOSPITAL ENCOUNTER (EMERGENCY)
Age: 73
Discharge: HOME | End: 2018-12-17
Attending: EMERGENCY MEDICINE
Payer: MEDICARE

## 2018-12-17 ENCOUNTER — APPOINTMENT (OUTPATIENT)
Dept: CT IMAGING | Age: 73
End: 2018-12-17
Payer: MEDICARE

## 2018-12-17 ENCOUNTER — APPOINTMENT (OUTPATIENT)
Dept: GENERAL RADIOLOGY | Age: 73
End: 2018-12-17
Payer: MEDICARE

## 2018-12-17 VITALS
DIASTOLIC BLOOD PRESSURE: 104 MMHG | RESPIRATION RATE: 18 BRPM | HEART RATE: 90 BPM | TEMPERATURE: 98.4 F | WEIGHT: 145 LBS | HEIGHT: 65 IN | BODY MASS INDEX: 24.16 KG/M2 | SYSTOLIC BLOOD PRESSURE: 169 MMHG | OXYGEN SATURATION: 93 %

## 2018-12-17 DIAGNOSIS — S52.502A CLOSED FRACTURE OF DISTAL ENDS OF LEFT RADIUS AND ULNA, INITIAL ENCOUNTER: Primary | ICD-10-CM

## 2018-12-17 DIAGNOSIS — S52.602A CLOSED FRACTURE OF DISTAL ENDS OF LEFT RADIUS AND ULNA, INITIAL ENCOUNTER: Primary | ICD-10-CM

## 2018-12-17 PROCEDURE — 6370000000 HC RX 637 (ALT 250 FOR IP)

## 2018-12-17 PROCEDURE — 73200 CT UPPER EXTREMITY W/O DYE: CPT

## 2018-12-17 PROCEDURE — 29125 APPL SHORT ARM SPLINT STATIC: CPT

## 2018-12-17 PROCEDURE — 99284 EMERGENCY DEPT VISIT MOD MDM: CPT

## 2018-12-17 PROCEDURE — 96372 THER/PROPH/DIAG INJ SC/IM: CPT

## 2018-12-17 PROCEDURE — 73110 X-RAY EXAM OF WRIST: CPT

## 2018-12-17 PROCEDURE — 73060 X-RAY EXAM OF HUMERUS: CPT

## 2018-12-17 PROCEDURE — 72100 X-RAY EXAM L-S SPINE 2/3 VWS: CPT

## 2018-12-17 PROCEDURE — 6370000000 HC RX 637 (ALT 250 FOR IP): Performed by: EMERGENCY MEDICINE

## 2018-12-17 PROCEDURE — 73090 X-RAY EXAM OF FOREARM: CPT

## 2018-12-17 PROCEDURE — 73030 X-RAY EXAM OF SHOULDER: CPT

## 2018-12-17 PROCEDURE — 6360000002 HC RX W HCPCS: Performed by: EMERGENCY MEDICINE

## 2018-12-17 RX ORDER — HYDROMORPHONE HCL 110MG/55ML
1 PATIENT CONTROLLED ANALGESIA SYRINGE INTRAVENOUS ONCE
Status: COMPLETED | OUTPATIENT
Start: 2018-12-17 | End: 2018-12-17

## 2018-12-17 RX ORDER — OXYCODONE HYDROCHLORIDE AND ACETAMINOPHEN 5; 325 MG/1; MG/1
1 TABLET ORAL EVERY 4 HOURS PRN
Qty: 12 TABLET | Refills: 0 | Status: ON HOLD | OUTPATIENT
Start: 2018-12-17 | End: 2018-12-21 | Stop reason: HOSPADM

## 2018-12-17 RX ORDER — OXYCODONE HYDROCHLORIDE AND ACETAMINOPHEN 5; 325 MG/1; MG/1
2 TABLET ORAL ONCE
Status: COMPLETED | OUTPATIENT
Start: 2018-12-17 | End: 2018-12-17

## 2018-12-17 RX ORDER — KETOROLAC TROMETHAMINE 15 MG/ML
15 INJECTION, SOLUTION INTRAMUSCULAR; INTRAVENOUS ONCE
Status: DISCONTINUED | OUTPATIENT
Start: 2018-12-17 | End: 2018-12-17

## 2018-12-17 RX ORDER — KETOROLAC TROMETHAMINE 15 MG/ML
15 INJECTION, SOLUTION INTRAMUSCULAR; INTRAVENOUS ONCE
Status: COMPLETED | OUTPATIENT
Start: 2018-12-17 | End: 2018-12-17

## 2018-12-17 RX ORDER — OXYCODONE HYDROCHLORIDE AND ACETAMINOPHEN 5; 325 MG/1; MG/1
1 TABLET ORAL ONCE
Status: DISCONTINUED | OUTPATIENT
Start: 2018-12-17 | End: 2018-12-17 | Stop reason: HOSPADM

## 2018-12-17 RX ADMIN — OXYCODONE HYDROCHLORIDE AND ACETAMINOPHEN 2 TABLET: 5; 325 TABLET ORAL at 11:05

## 2018-12-17 RX ADMIN — HYDROMORPHONE HYDROCHLORIDE 1 MG: 2 INJECTION, SOLUTION INTRAMUSCULAR; INTRAVENOUS; SUBCUTANEOUS at 12:25

## 2018-12-17 RX ADMIN — KETOROLAC TROMETHAMINE 15 MG: 15 INJECTION, SOLUTION INTRAMUSCULAR; INTRAVENOUS at 11:06

## 2018-12-17 ASSESSMENT — PAIN DESCRIPTION - ORIENTATION: ORIENTATION: LEFT

## 2018-12-17 ASSESSMENT — PAIN SCALES - GENERAL
PAINLEVEL_OUTOF10: 5
PAINLEVEL_OUTOF10: 10
PAINLEVEL_OUTOF10: 10
PAINLEVEL_OUTOF10: 8

## 2018-12-17 ASSESSMENT — PAIN DESCRIPTION - PAIN TYPE
TYPE: ACUTE PAIN
TYPE: ACUTE PAIN

## 2018-12-17 ASSESSMENT — PAIN DESCRIPTION - LOCATION: LOCATION: ARM

## 2018-12-17 NOTE — ED PROVIDER NOTES
CURRENT MEDICATIONS       Discharge Medication List as of 12/17/2018  1:54 PM      CONTINUE these medications which have NOT CHANGED    Details   NONFORMULARY Historical Med      albuterol sulfate  (90 Base) MCG/ACT inhaler Inhale 2 puffs into the lungs 4 times daily, Disp-1 Inhaler, R-0Normal      levothyroxine (SYNTHROID) 150 MCG tablet Take 150 mcg by mouth DailyHistorical Med      aspirin 81 MG tablet Take 81 mg by mouth dailyHistorical Med      DULoxetine (CYMBALTA) 60 MG extended release capsule Take 60 mg by mouth dailyHistorical Med      traZODone (DESYREL) 100 MG tablet Take 200 mg by mouth nightly Historical Med      pantoprazole sodium (PROTONIX) 40 MG PACK packet Take 40 mg by mouth 2 times daily (before meals)Historical Med      potassium chloride (KLOR-CON) 20 MEQ packet Take 20 mEq by mouth 2 times dailyHistorical Med      pregabalin (LYRICA) 100 MG capsule Take 300 mg by mouth 2 times daily. Doris Mijares Historical Med      calcium citrate-vitamin D (CITRICAL + D) 315-250 MG-UNIT TABS per tablet Take 1 tablet by mouth 2 times daily (with meals)Historical Med      HYDROcodone-acetaminophen (NORCO)  MG per tablet Take 1 tablet by mouth every 8 hours as needed for Pain. Doris Mijares Historical Med      alendronate (FOSAMAX) 70 MG tablet Take 70 mg by mouth every 7 daysHistorical Med             ALLERGIES     Patient has no known allergies. FAMILY HISTORY     History reviewed. No pertinent family history.      SOCIAL HISTORY       Social History     Social History    Marital status:      Spouse name: N/A    Number of children: N/A    Years of education: N/A     Social History Main Topics    Smoking status: Never Smoker    Smokeless tobacco: Never Used    Alcohol use No    Drug use: No    Sexual activity: Not Asked     Other Topics Concern    None     Social History Narrative    None       SCREENINGS           PHYSICAL EXAM    (up to 7 for level 4, 8 or more for level 5)     ED Triage Vitals 1. Acute transverse fracture of the distal radius through the metaphysis with slight apex volar angulation. 2. Minimally displaced ulnar styloid process fracture. 3. Cortical irregularity of the triquetrum suspicious for impaction fracture related to the pisiform versus degenerative cyst formation. 4. No convincing proximal humeral fracture. 5. Intact hemiarthroplasty of the shoulder. Ct Wrist Left Wo Contrast    Result Date: 12/17/2018  EXAMINATION: CT OF THE LEFT WRIST WITHOUT CONTRAST 12/17/2018 1:22 pm TECHNIQUE: CT of the left wrist was performed without the administration of intravenous contrast.  Multiplanar reformatted images are provided for review. Dose modulation, iterative reconstruction, and/or weight based adjustment of the mA/kV was utilized to reduce the radiation dose to as low as reasonably achievable. COMPARISON: None. HISTORY ORDERING SYSTEM PROVIDED HISTORY: BONE PAIN, WRIST FINDINGS: Bones: There is a transverse fracture of the distal radius extending into the sigmoid notch and through the dorsal cortex of the distal radius near its metaphysis. There is slight apex volar angulation and minimal comminution. Nondisplaced ulnar styloid process fracture. The previously described cyst in the triquetrum is felt to be degenerative in nature rather than related to impaction fracture. There is heterotopic ossification related to the pisiform. Soft Tissue:  Circumferential swelling of the wrist.  No obvious tendon or muscle pathology but evaluation is limited. Joint:  Joint spaces are grossly maintained. Degenerative changes of triscaphe and thumb CMC joint are noted. 1. Acute transverse distal radial fracture with extension into the sigmoid notch. 2. Nondisplaced ulnar styloid fracture. 3. Lucency in the triquetrum is felt to reflect sequela of degenerative cyst formation rather than fracture given appearance on CT.      Xr Shoulder Left (min 2 Views)    Result Date: 12/17/2018  EXAMINATION: AP AND LATERAL XRAY VIEWS OF THE LEFT FOREARM; TWO XRAY VIEWS OF THE LEFT HUMERUS; 3 XRAY VIEWS OF THE LEFT SHOULDER; 3 XRAY VIEWS OF THE LEFT WRIST 12/17/2018 11:48 am COMPARISON: None HISTORY: ORDERING SYSTEM PROVIDED HISTORY: pain TECHNOLOGIST PROVIDED HISTORY: pain FINDINGS: Wrist: Acute transverse impacted distal radial metaphyseal fracture with slight apex volar angulation. Also present is a displaced styloid process fracture and question of posttraumatic cyst or impaction injury of the triquetrum. There is calcification of the triangular fibrocartilage complex. Degenerative changes of the thumb CMC and triscaphe joint are also present. Forearm: Both-bone forearm fracture as above. Otherwise no proximal forearm fracture. Humerus: No fracture or periostitis. No loosening. Question of focal cortical irregularity along the proximal humerus associated with the prosthesis. This is not seen on the separate dedicated shoulder radiographs possibly artifactual. Shoulder: Hemiarthroplasty intact without loosening. Degenerative changes of the [facility] joint. 1. Acute transverse fracture of the distal radius through the metaphysis with slight apex volar angulation. 2. Minimally displaced ulnar styloid process fracture. 3. Cortical irregularity of the triquetrum suspicious for impaction fracture related to the pisiform versus degenerative cyst formation. 4. No convincing proximal humeral fracture. 5. Intact hemiarthroplasty of the shoulder. ED BEDSIDE ULTRASOUND:   Performed by ED Physician - none    LABS:  Labs Reviewed - No data to display     No results found for this visit on 12/17/18. All other labs were within normal range or not returned as of this dictation.     EMERGENCY DEPARTMENT COURSE andDIFFERENTIAL DIAGNOSIS/MDM:   Vitals:    Vitals:    12/17/18 1032 12/17/18 1228 12/17/18 1446   BP: (!) 179/112 (!) 153/94 (!) 169/104   Pulse: 90     Resp: 18  18   Temp: 98.4 °F (36.9

## 2018-12-18 ENCOUNTER — HOSPITAL ENCOUNTER (OUTPATIENT)
Age: 73
Discharge: HOME | End: 2018-12-18
Payer: MEDICARE

## 2018-12-18 LAB
ANION GAP SERPL CALCULATED.3IONS-SCNC: 11 MMOL/L (ref 9–17)
BUN BLDV-MCNC: 16 MG/DL (ref 8–23)
BUN/CREAT BLD: 17 (ref 9–20)
CALCIUM SERPL-MCNC: 10 MG/DL (ref 8.6–10.4)
CHLORIDE BLD-SCNC: 94 MMOL/L (ref 98–107)
CO2: 29 MMOL/L (ref 20–31)
CREAT SERPL-MCNC: 0.94 MG/DL (ref 0.5–0.9)
EKG ATRIAL RATE: 95 BPM
EKG P AXIS: 69 DEGREES
EKG P-R INTERVAL: 166 MS
EKG Q-T INTERVAL: 360 MS
EKG QRS DURATION: 80 MS
EKG QTC CALCULATION (BAZETT): 452 MS
EKG T AXIS: 36 DEGREES
EKG VENTRICULAR RATE: 95 BPM
GFR AFRICAN AMERICAN: >60 ML/MIN
GFR NON-AFRICAN AMERICAN: 59 ML/MIN
GFR SERPL CREATININE-BSD FRML MDRD: ABNORMAL ML/MIN/{1.73_M2}
GFR SERPL CREATININE-BSD FRML MDRD: ABNORMAL ML/MIN/{1.73_M2}
GLUCOSE BLD-MCNC: 102 MG/DL (ref 70–99)
HCT VFR BLD CALC: 35.4 % (ref 36.3–47.1)
HEMOGLOBIN: 10.8 G/DL (ref 11.9–15.1)
MCH RBC QN AUTO: 25.8 PG (ref 25.2–33.5)
MCHC RBC AUTO-ENTMCNC: 30.5 G/DL (ref 28.4–34.8)
MCV RBC AUTO: 84.5 FL (ref 82.6–102.9)
NRBC AUTOMATED: 0 PER 100 WBC
PDW BLD-RTO: 17.5 % (ref 11.8–14.4)
PLATELET # BLD: 325 K/UL (ref 138–453)
PMV BLD AUTO: 9.6 FL (ref 8.1–13.5)
POTASSIUM SERPL-SCNC: 3.8 MMOL/L (ref 3.7–5.3)
RBC # BLD: 4.19 M/UL (ref 3.95–5.11)
SODIUM BLD-SCNC: 134 MMOL/L (ref 135–144)
WBC # BLD: 7.5 K/UL (ref 3.5–11.3)

## 2018-12-18 PROCEDURE — 85027 COMPLETE CBC AUTOMATED: CPT

## 2018-12-18 PROCEDURE — 36415 COLL VENOUS BLD VENIPUNCTURE: CPT

## 2018-12-18 PROCEDURE — 80048 BASIC METABOLIC PNL TOTAL CA: CPT

## 2018-12-18 PROCEDURE — 93005 ELECTROCARDIOGRAM TRACING: CPT

## 2018-12-19 ENCOUNTER — ANESTHESIA EVENT (OUTPATIENT)
Dept: OPERATING ROOM | Age: 73
End: 2018-12-19
Payer: MEDICARE

## 2018-12-20 ENCOUNTER — HOSPITAL ENCOUNTER (OUTPATIENT)
Age: 73
Setting detail: OBSERVATION
Discharge: HOME | End: 2018-12-21
Attending: ORTHOPAEDIC SURGERY | Admitting: ORTHOPAEDIC SURGERY
Payer: MEDICARE

## 2018-12-20 ENCOUNTER — ANESTHESIA (OUTPATIENT)
Dept: OPERATING ROOM | Age: 73
End: 2018-12-20
Payer: MEDICARE

## 2018-12-20 ENCOUNTER — APPOINTMENT (OUTPATIENT)
Dept: GENERAL RADIOLOGY | Age: 73
End: 2018-12-20
Attending: ORTHOPAEDIC SURGERY
Payer: MEDICARE

## 2018-12-20 VITALS
DIASTOLIC BLOOD PRESSURE: 63 MMHG | SYSTOLIC BLOOD PRESSURE: 111 MMHG | OXYGEN SATURATION: 100 % | RESPIRATION RATE: 9 BRPM

## 2018-12-20 DIAGNOSIS — S62.102A WRIST FRACTURE, CLOSED, LEFT, INITIAL ENCOUNTER: Primary | ICD-10-CM

## 2018-12-20 PROBLEM — Z98.890 STATUS POST SURGERY: Status: ACTIVE | Noted: 2018-12-20

## 2018-12-20 PROCEDURE — 7100000011 HC PHASE II RECOVERY - ADDTL 15 MIN: Performed by: ORTHOPAEDIC SURGERY

## 2018-12-20 PROCEDURE — 6360000002 HC RX W HCPCS

## 2018-12-20 PROCEDURE — 2580000003 HC RX 258: Performed by: ORTHOPAEDIC SURGERY

## 2018-12-20 PROCEDURE — 3600000014 HC SURGERY LEVEL 4 ADDTL 15MIN: Performed by: ORTHOPAEDIC SURGERY

## 2018-12-20 PROCEDURE — G0378 HOSPITAL OBSERVATION PER HR: HCPCS

## 2018-12-20 PROCEDURE — 73100 X-RAY EXAM OF WRIST: CPT

## 2018-12-20 PROCEDURE — 6370000000 HC RX 637 (ALT 250 FOR IP): Performed by: ORTHOPAEDIC SURGERY

## 2018-12-20 PROCEDURE — 94664 DEMO&/EVAL PT USE INHALER: CPT

## 2018-12-20 PROCEDURE — 6360000002 HC RX W HCPCS: Performed by: NURSE ANESTHETIST, CERTIFIED REGISTERED

## 2018-12-20 PROCEDURE — 2709999900 HC NON-CHARGEABLE SUPPLY: Performed by: ORTHOPAEDIC SURGERY

## 2018-12-20 PROCEDURE — 6360000002 HC RX W HCPCS: Performed by: ORTHOPAEDIC SURGERY

## 2018-12-20 PROCEDURE — 7100000010 HC PHASE II RECOVERY - FIRST 15 MIN: Performed by: ORTHOPAEDIC SURGERY

## 2018-12-20 PROCEDURE — 2500000003 HC RX 250 WO HCPCS: Performed by: NURSE ANESTHETIST, CERTIFIED REGISTERED

## 2018-12-20 PROCEDURE — 94640 AIRWAY INHALATION TREATMENT: CPT

## 2018-12-20 PROCEDURE — 2720000010 HC SURG SUPPLY STERILE: Performed by: ORTHOPAEDIC SURGERY

## 2018-12-20 PROCEDURE — 3700000001 HC ADD 15 MINUTES (ANESTHESIA): Performed by: ORTHOPAEDIC SURGERY

## 2018-12-20 PROCEDURE — 64415 NJX AA&/STRD BRCH PLXS IMG: CPT | Performed by: NURSE ANESTHETIST, CERTIFIED REGISTERED

## 2018-12-20 PROCEDURE — 94760 N-INVAS EAR/PLS OXIMETRY 1: CPT

## 2018-12-20 PROCEDURE — 2700000000 HC OXYGEN THERAPY PER DAY

## 2018-12-20 PROCEDURE — 3600000004 HC SURGERY LEVEL 4 BASE: Performed by: ORTHOPAEDIC SURGERY

## 2018-12-20 PROCEDURE — 3700000000 HC ANESTHESIA ATTENDED CARE: Performed by: ORTHOPAEDIC SURGERY

## 2018-12-20 PROCEDURE — C1713 ANCHOR/SCREW BN/BN,TIS/BN: HCPCS | Performed by: ORTHOPAEDIC SURGERY

## 2018-12-20 DEVICE — SCREW BNE L14MM DIA2.4MM DST RAD VOLAR S STL ST VAR ANG LOK: Type: IMPLANTABLE DEVICE | Site: WRIST | Status: FUNCTIONAL

## 2018-12-20 DEVICE — PLATE DISTL RAD VAR LT 2.4X54MM: Type: IMPLANTABLE DEVICE | Site: WRIST | Status: FUNCTIONAL

## 2018-12-20 DEVICE — SCREW BNE L20MM DIA2.4MM DST RAD VOLAR S STL ST VAR ANG LOK: Type: IMPLANTABLE DEVICE | Site: WRIST | Status: FUNCTIONAL

## 2018-12-20 DEVICE — SCREW BNE L18MM DIA2.4MM DST RAD VOLAR S STL ST VAR ANG LOK: Type: IMPLANTABLE DEVICE | Site: WRIST | Status: FUNCTIONAL

## 2018-12-20 DEVICE — SCREW BNE L16MM DIA2.4MM CORT S STL ST T8 STARDRV RECESS: Type: IMPLANTABLE DEVICE | Site: WRIST | Status: FUNCTIONAL

## 2018-12-20 DEVICE — SCREW BNE L16MM DIA2.4MM DST RAD VOLAR S STL ST VAR ANG LOK: Type: IMPLANTABLE DEVICE | Site: WRIST | Status: FUNCTIONAL

## 2018-12-20 DEVICE — SCREW BNE L22MM DIA2.4MM DST RAD VOLAR S STL ST VAR ANG LOK: Type: IMPLANTABLE DEVICE | Site: WRIST | Status: FUNCTIONAL

## 2018-12-20 RX ORDER — CYANOCOBALAMIN (VITAMIN B-12) 1000 MCG
1 TABLET, EXTENDED RELEASE ORAL 2 TIMES DAILY WITH MEALS
Status: DISCONTINUED | OUTPATIENT
Start: 2018-12-20 | End: 2018-12-21 | Stop reason: HOSPADM

## 2018-12-20 RX ORDER — TRAZODONE HYDROCHLORIDE 50 MG/1
200 TABLET ORAL NIGHTLY
Status: DISCONTINUED | OUTPATIENT
Start: 2018-12-20 | End: 2018-12-21 | Stop reason: HOSPADM

## 2018-12-20 RX ORDER — SUCCINYLCHOLINE CHLORIDE 20 MG/ML
INJECTION INTRAMUSCULAR; INTRAVENOUS PRN
Status: DISCONTINUED | OUTPATIENT
Start: 2018-12-20 | End: 2018-12-20 | Stop reason: SDUPTHER

## 2018-12-20 RX ORDER — HYDROCODONE BITARTRATE AND ACETAMINOPHEN 5; 325 MG/1; MG/1
1-2 TABLET ORAL EVERY 6 HOURS PRN
Qty: 42 TABLET | Refills: 0 | Status: SHIPPED | OUTPATIENT
Start: 2018-12-20 | End: 2018-12-21 | Stop reason: HOSPADM

## 2018-12-20 RX ORDER — DEXAMETHASONE SODIUM PHOSPHATE 4 MG/ML
INJECTION, SOLUTION INTRA-ARTICULAR; INTRALESIONAL; INTRAMUSCULAR; INTRAVENOUS; SOFT TISSUE PRN
Status: DISCONTINUED | OUTPATIENT
Start: 2018-12-20 | End: 2018-12-20 | Stop reason: SDUPTHER

## 2018-12-20 RX ORDER — POTASSIUM CHLORIDE 20MEQ/15ML
20 LIQUID (ML) ORAL 2 TIMES DAILY
Status: DISCONTINUED | OUTPATIENT
Start: 2018-12-20 | End: 2018-12-21 | Stop reason: HOSPADM

## 2018-12-20 RX ORDER — ALBUTEROL SULFATE 2.5 MG/3ML
SOLUTION RESPIRATORY (INHALATION)
Status: COMPLETED
Start: 2018-12-20 | End: 2018-12-20

## 2018-12-20 RX ORDER — HYDROCODONE BITARTRATE AND ACETAMINOPHEN 5; 325 MG/1; MG/1
1 TABLET ORAL EVERY 6 HOURS PRN
Status: DISCONTINUED | OUTPATIENT
Start: 2018-12-20 | End: 2018-12-21 | Stop reason: ALTCHOICE

## 2018-12-20 RX ORDER — PANTOPRAZOLE SODIUM 40 MG/1
40 TABLET, DELAYED RELEASE ORAL
Status: DISCONTINUED | OUTPATIENT
Start: 2018-12-21 | End: 2018-12-21 | Stop reason: HOSPADM

## 2018-12-20 RX ORDER — CEFAZOLIN SODIUM 2 G/50ML
2 SOLUTION INTRAVENOUS ONCE
Status: COMPLETED | OUTPATIENT
Start: 2018-12-20 | End: 2018-12-20

## 2018-12-20 RX ORDER — SODIUM CHLORIDE, SODIUM LACTATE, POTASSIUM CHLORIDE, CALCIUM CHLORIDE 600; 310; 30; 20 MG/100ML; MG/100ML; MG/100ML; MG/100ML
INJECTION, SOLUTION INTRAVENOUS CONTINUOUS
Status: DISCONTINUED | OUTPATIENT
Start: 2018-12-20 | End: 2018-12-20

## 2018-12-20 RX ORDER — LIDOCAINE HYDROCHLORIDE 20 MG/ML
INJECTION, SOLUTION INFILTRATION; PERINEURAL PRN
Status: DISCONTINUED | OUTPATIENT
Start: 2018-12-20 | End: 2018-12-20 | Stop reason: SDUPTHER

## 2018-12-20 RX ORDER — FUROSEMIDE 40 MG/1
40 TABLET ORAL PRN
Status: ON HOLD | COMMUNITY
Start: 2018-04-11 | End: 2020-08-21 | Stop reason: SDUPTHER

## 2018-12-20 RX ORDER — ASPIRIN 81 MG/1
81 TABLET ORAL DAILY
Status: DISCONTINUED | OUTPATIENT
Start: 2018-12-20 | End: 2018-12-21 | Stop reason: HOSPADM

## 2018-12-20 RX ORDER — SODIUM CHLORIDE, SODIUM LACTATE, POTASSIUM CHLORIDE, CALCIUM CHLORIDE 600; 310; 30; 20 MG/100ML; MG/100ML; MG/100ML; MG/100ML
INJECTION, SOLUTION INTRAVENOUS CONTINUOUS
Status: DISCONTINUED | OUTPATIENT
Start: 2018-12-20 | End: 2018-12-21 | Stop reason: HOSPADM

## 2018-12-20 RX ORDER — DIMENHYDRINATE 50 MG/1
50 TABLET ORAL ONCE
Status: COMPLETED | OUTPATIENT
Start: 2018-12-20 | End: 2018-12-20

## 2018-12-20 RX ORDER — DULOXETIN HYDROCHLORIDE 60 MG/1
60 CAPSULE, DELAYED RELEASE ORAL DAILY
Status: DISCONTINUED | OUTPATIENT
Start: 2018-12-20 | End: 2018-12-21 | Stop reason: HOSPADM

## 2018-12-20 RX ORDER — ROPIVACAINE HYDROCHLORIDE 5 MG/ML
INJECTION, SOLUTION EPIDURAL; INFILTRATION; PERINEURAL PRN
Status: DISCONTINUED | OUTPATIENT
Start: 2018-12-20 | End: 2018-12-20 | Stop reason: SDUPTHER

## 2018-12-20 RX ORDER — PROPOFOL 10 MG/ML
INJECTION, EMULSION INTRAVENOUS PRN
Status: DISCONTINUED | OUTPATIENT
Start: 2018-12-20 | End: 2018-12-20 | Stop reason: SDUPTHER

## 2018-12-20 RX ORDER — ALENDRONATE SODIUM 70 MG/1
70 TABLET ORAL
Status: DISCONTINUED | OUTPATIENT
Start: 2018-12-20 | End: 2018-12-20 | Stop reason: CLARIF

## 2018-12-20 RX ORDER — FENTANYL CITRATE 50 UG/ML
INJECTION, SOLUTION INTRAMUSCULAR; INTRAVENOUS PRN
Status: DISCONTINUED | OUTPATIENT
Start: 2018-12-20 | End: 2018-12-20 | Stop reason: SDUPTHER

## 2018-12-20 RX ORDER — CYCLOBENZAPRINE HCL 10 MG
10 TABLET ORAL 3 TIMES DAILY PRN
COMMUNITY
Start: 2018-11-19 | End: 2019-05-18

## 2018-12-20 RX ORDER — ALBUTEROL SULFATE 90 UG/1
2 AEROSOL, METERED RESPIRATORY (INHALATION) 4 TIMES DAILY
Status: DISCONTINUED | OUTPATIENT
Start: 2018-12-20 | End: 2018-12-21 | Stop reason: HOSPADM

## 2018-12-20 RX ORDER — HYDROCODONE BITARTRATE AND ACETAMINOPHEN 5; 325 MG/1; MG/1
2 TABLET ORAL EVERY 6 HOURS PRN
Status: DISCONTINUED | OUTPATIENT
Start: 2018-12-20 | End: 2018-12-21 | Stop reason: ALTCHOICE

## 2018-12-20 RX ORDER — LEVOTHYROXINE SODIUM 0.15 MG/1
150 TABLET ORAL DAILY
Status: DISCONTINUED | OUTPATIENT
Start: 2018-12-21 | End: 2018-12-21 | Stop reason: HOSPADM

## 2018-12-20 RX ORDER — PREGABALIN 75 MG/1
300 CAPSULE ORAL 2 TIMES DAILY
Status: DISCONTINUED | OUTPATIENT
Start: 2018-12-20 | End: 2018-12-21 | Stop reason: HOSPADM

## 2018-12-20 RX ORDER — ACETAMINOPHEN 325 MG/1
650 TABLET ORAL ONCE
Status: COMPLETED | OUTPATIENT
Start: 2018-12-20 | End: 2018-12-20

## 2018-12-20 RX ORDER — ALBUTEROL SULFATE 2.5 MG/3ML
2.5 SOLUTION RESPIRATORY (INHALATION) ONCE
Status: COMPLETED | OUTPATIENT
Start: 2018-12-20 | End: 2018-12-20

## 2018-12-20 RX ADMIN — DIMENHYDRINATE 50 MG: 50 TABLET ORAL at 14:21

## 2018-12-20 RX ADMIN — POTASSIUM CHLORIDE 20 MEQ: 40 SOLUTION ORAL at 20:52

## 2018-12-20 RX ADMIN — ASPIRIN 81 MG: 81 TABLET, COATED ORAL at 20:52

## 2018-12-20 RX ADMIN — ALBUTEROL SULFATE 2 PUFF: 90 AEROSOL, METERED RESPIRATORY (INHALATION) at 21:31

## 2018-12-20 RX ADMIN — ALBUTEROL SULFATE 2.5 MG: 2.5 SOLUTION RESPIRATORY (INHALATION) at 18:39

## 2018-12-20 RX ADMIN — ROPIVACAINE HYDROCHLORIDE 20 ML: 5 INJECTION, SOLUTION EPIDURAL; INFILTRATION; PERINEURAL at 15:16

## 2018-12-20 RX ADMIN — PROPOFOL 50 MG: 10 INJECTION, EMULSION INTRAVENOUS at 16:25

## 2018-12-20 RX ADMIN — PROPOFOL 150 MG: 10 INJECTION, EMULSION INTRAVENOUS at 16:22

## 2018-12-20 RX ADMIN — SUCCINYLCHOLINE CHLORIDE 100 MG: 20 INJECTION, SOLUTION INTRAMUSCULAR; INTRAVENOUS at 16:31

## 2018-12-20 RX ADMIN — FENTANYL CITRATE 50 MCG: 50 INJECTION INTRAMUSCULAR; INTRAVENOUS at 15:02

## 2018-12-20 RX ADMIN — SODIUM CHLORIDE, POTASSIUM CHLORIDE, SODIUM LACTATE AND CALCIUM CHLORIDE: 600; 310; 30; 20 INJECTION, SOLUTION INTRAVENOUS at 14:18

## 2018-12-20 RX ADMIN — LIDOCAINE HYDROCHLORIDE 3 ML: 20 INJECTION, SOLUTION INFILTRATION; PERINEURAL at 16:22

## 2018-12-20 RX ADMIN — PREGABALIN 300 MG: 75 CAPSULE ORAL at 20:52

## 2018-12-20 RX ADMIN — CEFAZOLIN SODIUM 2 G: 2 SOLUTION INTRAVENOUS at 16:13

## 2018-12-20 RX ADMIN — ACETAMINOPHEN 650 MG: 325 TABLET, FILM COATED ORAL at 14:21

## 2018-12-20 RX ADMIN — TRAZODONE HYDROCHLORIDE 200 MG: 50 TABLET ORAL at 20:52

## 2018-12-20 RX ADMIN — PROPOFOL 100 MG: 10 INJECTION, EMULSION INTRAVENOUS at 16:31

## 2018-12-20 RX ADMIN — SODIUM CHLORIDE, POTASSIUM CHLORIDE, SODIUM LACTATE AND CALCIUM CHLORIDE: 600; 310; 30; 20 INJECTION, SOLUTION INTRAVENOUS at 19:45

## 2018-12-20 RX ADMIN — DULOXETINE HYDROCHLORIDE 60 MG: 60 CAPSULE, DELAYED RELEASE ORAL at 20:52

## 2018-12-20 RX ADMIN — DEXAMETHASONE SODIUM PHOSPHATE 8 MG: 4 INJECTION, SOLUTION INTRAMUSCULAR; INTRAVENOUS at 16:40

## 2018-12-20 ASSESSMENT — PULMONARY FUNCTION TESTS
PIF_VALUE: 10
PIF_VALUE: 10
PIF_VALUE: 0
PIF_VALUE: 19
PIF_VALUE: 10
PIF_VALUE: 25
PIF_VALUE: 21
PIF_VALUE: 0
PIF_VALUE: 16
PIF_VALUE: 13
PIF_VALUE: 16
PIF_VALUE: 15
PIF_VALUE: 26
PIF_VALUE: 9
PIF_VALUE: 10
PIF_VALUE: 6
PIF_VALUE: 4
PIF_VALUE: 10
PIF_VALUE: 2
PIF_VALUE: 30
PIF_VALUE: 10
PIF_VALUE: 10
PIF_VALUE: 1
PIF_VALUE: 23
PIF_VALUE: 10
PIF_VALUE: 1
PIF_VALUE: 26
PIF_VALUE: 10
PIF_VALUE: 10
PIF_VALUE: 11
PIF_VALUE: 9
PIF_VALUE: 10
PIF_VALUE: 21
PIF_VALUE: 10
PIF_VALUE: 12
PIF_VALUE: 25
PIF_VALUE: 2
PIF_VALUE: 1
PIF_VALUE: 20
PIF_VALUE: 20
PIF_VALUE: 10
PIF_VALUE: 8
PIF_VALUE: 10
PIF_VALUE: 10
PIF_VALUE: 13
PIF_VALUE: 10
PIF_VALUE: 20
PIF_VALUE: 10
PIF_VALUE: 10
PIF_VALUE: 20
PIF_VALUE: 0
PIF_VALUE: 13
PIF_VALUE: 10
PIF_VALUE: 16
PIF_VALUE: 10
PIF_VALUE: 20
PIF_VALUE: 11

## 2018-12-20 ASSESSMENT — LIFESTYLE VARIABLES: SMOKING_STATUS: 0

## 2018-12-20 ASSESSMENT — PAIN DESCRIPTION - LOCATION: LOCATION: WRIST

## 2018-12-20 ASSESSMENT — PAIN SCALES - GENERAL
PAINLEVEL_OUTOF10: 0
PAINLEVEL_OUTOF10: 0
PAINLEVEL_OUTOF10: 8
PAINLEVEL_OUTOF10: 0
PAINLEVEL_OUTOF10: 0

## 2018-12-20 ASSESSMENT — PAIN DESCRIPTION - ORIENTATION: ORIENTATION: LEFT

## 2018-12-20 ASSESSMENT — ENCOUNTER SYMPTOMS: SHORTNESS OF BREATH: 0

## 2018-12-21 VITALS
TEMPERATURE: 97.5 F | SYSTOLIC BLOOD PRESSURE: 111 MMHG | WEIGHT: 154.8 LBS | OXYGEN SATURATION: 95 % | RESPIRATION RATE: 16 BRPM | HEIGHT: 63 IN | BODY MASS INDEX: 27.43 KG/M2 | DIASTOLIC BLOOD PRESSURE: 59 MMHG | HEART RATE: 78 BPM

## 2018-12-21 PROCEDURE — 94640 AIRWAY INHALATION TREATMENT: CPT

## 2018-12-21 PROCEDURE — G0378 HOSPITAL OBSERVATION PER HR: HCPCS

## 2018-12-21 PROCEDURE — 6370000000 HC RX 637 (ALT 250 FOR IP): Performed by: ORTHOPAEDIC SURGERY

## 2018-12-21 RX ORDER — OXYCODONE HYDROCHLORIDE AND ACETAMINOPHEN 5; 325 MG/1; MG/1
1 TABLET ORAL EVERY 4 HOURS PRN
Status: DISCONTINUED | OUTPATIENT
Start: 2018-12-21 | End: 2018-12-21 | Stop reason: HOSPADM

## 2018-12-21 RX ORDER — OXYCODONE HYDROCHLORIDE AND ACETAMINOPHEN 5; 325 MG/1; MG/1
1 TABLET ORAL 4 TIMES DAILY PRN
Qty: 20 TABLET | Refills: 0
Start: 2018-12-21 | End: 2018-12-26

## 2018-12-21 RX ADMIN — PANTOPRAZOLE SODIUM 40 MG: 40 TABLET, DELAYED RELEASE ORAL at 07:07

## 2018-12-21 RX ADMIN — ALBUTEROL SULFATE 2 PUFF: 90 AEROSOL, METERED RESPIRATORY (INHALATION) at 11:47

## 2018-12-21 RX ADMIN — POTASSIUM CHLORIDE 20 MEQ: 40 SOLUTION ORAL at 08:08

## 2018-12-21 RX ADMIN — ALBUTEROL SULFATE 2 PUFF: 90 AEROSOL, METERED RESPIRATORY (INHALATION) at 06:00

## 2018-12-21 RX ADMIN — HYDROCODONE BITARTRATE AND ACETAMINOPHEN 2 TABLET: 5; 325 TABLET ORAL at 07:06

## 2018-12-21 RX ADMIN — OXYCODONE AND ACETAMINOPHEN 1 TABLET: 5; 325 TABLET ORAL at 11:59

## 2018-12-21 RX ADMIN — ASPIRIN 81 MG: 81 TABLET, COATED ORAL at 08:08

## 2018-12-21 RX ADMIN — DULOXETINE HYDROCHLORIDE 60 MG: 60 CAPSULE, DELAYED RELEASE ORAL at 08:08

## 2018-12-21 RX ADMIN — LEVOTHYROXINE SODIUM 150 MCG: 150 TABLET ORAL at 07:07

## 2018-12-21 RX ADMIN — PREGABALIN 300 MG: 75 CAPSULE ORAL at 08:08

## 2018-12-21 ASSESSMENT — PAIN SCALES - GENERAL
PAINLEVEL_OUTOF10: 3
PAINLEVEL_OUTOF10: 7
PAINLEVEL_OUTOF10: 7
PAINLEVEL_OUTOF10: 0
PAINLEVEL_OUTOF10: 7
PAINLEVEL_OUTOF10: 4

## 2018-12-21 ASSESSMENT — PAIN DESCRIPTION - DESCRIPTORS
DESCRIPTORS: ACHING;THROBBING
DESCRIPTORS: ACHING

## 2018-12-21 ASSESSMENT — PAIN DESCRIPTION - PAIN TYPE
TYPE: SURGICAL PAIN
TYPE: SURGICAL PAIN

## 2018-12-21 ASSESSMENT — PAIN DESCRIPTION - ORIENTATION
ORIENTATION: LEFT
ORIENTATION: LEFT

## 2018-12-21 ASSESSMENT — PAIN DESCRIPTION - LOCATION
LOCATION: WRIST
LOCATION: WRIST

## 2018-12-21 ASSESSMENT — PAIN DESCRIPTION - FREQUENCY
FREQUENCY: CONTINUOUS
FREQUENCY: CONTINUOUS

## 2018-12-21 NOTE — PROGRESS NOTES
Dr. Tj Lyles to write new script for Percocet; will have Dr. Corado Better sign it and bring it up d/t Dr. Tj Lyles being in [facility] office.

## 2018-12-21 NOTE — PROGRESS NOTES
Dr. Kaitlin Harvey notified of low SpO2 per JÚNIOR Jean CRNA with orders received to admit patient overnight

## 2018-12-21 NOTE — PROGRESS NOTES
Nutrition Assessment    Type and Reason for Visit: Initial    Nutrition Recommendations: Encourage PO intakes for healing    Nutrition Assessment: Increased energy requirements r/t fx radius, AEB surgical interventions with healing needs. Taking PO well and denying needs upon visit. Hopes to d/c today. Malnutrition Assessment:  · Malnutrition Status: No malnutrition  · Context: Acute illness or injury  · Findings of the 6 clinical characteristics of malnutrition (Minimum of 2 out of 6 clinical characteristics is required to make the diagnosis of moderate or severe Protein Calorie Malnutrition based on AND/ASPEN Guidelines):  1. Energy Intake- (NA),      2. Weight Loss-No significant weight loss,    3. Fat Loss-No significant subcutaneous fat loss,    4. Muscle Loss-No significant muscle mass loss,    5. Fluid Accumulation-No significant fluid accumulation,    6.  Strength-Not measured    Nutrition Risk Level: Low    Nutrition Diagnosis:   · Problem: Increased nutrient needs  · Etiology: related to Acute injury/trauma     Signs and symptoms:  as evidenced by Presence of wounds    Objective Information:  · Nutrition-Focused Physical Findings: no malnutrition indices  · Wound Type: None  · Current Nutrition Therapies:  · Oral Diet Orders: General   · Oral Diet intake: % (per patient)  · Oral Nutrition Supplement (ONS) Orders: None  · Anthropometric Measures:  · Ht: 5' 3\" (160 cm)   · Current Body Wt: 154 lb 12.8 oz (70.2 kg)  · Admission Body Wt: 145 lb (65.8 kg)  · Usual Body Wt:  (144-160#)  · % Weight Change:  ,  up and down historically.   160# value during last hospitalization, other weights likely stated  · Ideal Body Wt: 115 lb (52.2 kg), % Ideal Body 135%  · BMI Classification: BMI 25.0 - 29.9 Overweight    Lab Results   Component Value Date     (L) 12/18/2018    K 3.8 12/18/2018    CL 94 (L) 12/18/2018    CO2 29 12/18/2018    BUN 16 12/18/2018    CREATININE 0.94 (H) 12/18/2018    GLUCOSE 102 (H) 12/18/2018    CALCIUM 10.0 12/18/2018    PROT 6.2 (L) 10/08/2018    LABALBU 3.1 (L) 10/08/2018    BILITOT 0.24 (L) 10/08/2018    ALKPHOS 65 10/08/2018    AST 13 10/08/2018    ALT 10 10/08/2018    LABGLOM 59 (L) 12/18/2018    GFRAA >60 12/18/2018    GLOB NOT REPORTED 10/05/2018     No results found for: LABA1C  No results found for: EAG    Nutrition Interventions:   Continue current diet  Coordination of Care, Continued Inpatient Monitoring, Education not appropriate at this time    Nutrition Evaluation:   · Evaluation: Goals set   · Goals: PO>75% meals     · Monitoring: Meal Intake, Weight, Pertinent Labs    Electronically signed by Juan Christian RD, ANDREW on 12/21/18 at 8:32 AM    Contact Number: [phone]

## 2018-12-28 ENCOUNTER — APPOINTMENT (OUTPATIENT)
Dept: CT IMAGING | Age: 73
End: 2018-12-28
Payer: MEDICARE

## 2018-12-28 ENCOUNTER — HOSPITAL ENCOUNTER (EMERGENCY)
Age: 73
Discharge: HOME | End: 2018-12-28
Attending: EMERGENCY MEDICINE
Payer: MEDICARE

## 2018-12-28 ENCOUNTER — APPOINTMENT (OUTPATIENT)
Dept: GENERAL RADIOLOGY | Age: 73
End: 2018-12-28
Payer: MEDICARE

## 2018-12-28 VITALS
BODY MASS INDEX: 26.04 KG/M2 | HEART RATE: 68 BPM | DIASTOLIC BLOOD PRESSURE: 84 MMHG | WEIGHT: 147 LBS | TEMPERATURE: 99.2 F | OXYGEN SATURATION: 96 % | SYSTOLIC BLOOD PRESSURE: 162 MMHG | RESPIRATION RATE: 26 BRPM

## 2018-12-28 DIAGNOSIS — J18.9 PNEUMONIA DUE TO ORGANISM: ICD-10-CM

## 2018-12-28 DIAGNOSIS — R05.9 COUGH: Primary | ICD-10-CM

## 2018-12-28 LAB
ABSOLUTE EOS #: 0.11 K/UL (ref 0–0.44)
ABSOLUTE IMMATURE GRANULOCYTE: <0.03 K/UL (ref 0–0.3)
ABSOLUTE LYMPH #: 1.5 K/UL (ref 1.1–3.7)
ABSOLUTE MONO #: 0.54 K/UL (ref 0.1–1.2)
ANION GAP SERPL CALCULATED.3IONS-SCNC: 9 MMOL/L (ref 9–17)
BASOPHILS # BLD: 0 % (ref 0–2)
BASOPHILS ABSOLUTE: <0.03 K/UL (ref 0–0.2)
BNP INTERPRETATION: ABNORMAL
BUN BLDV-MCNC: 8 MG/DL (ref 8–23)
BUN/CREAT BLD: 12 (ref 9–20)
CALCIUM SERPL-MCNC: 8.7 MG/DL (ref 8.6–10.4)
CHLORIDE BLD-SCNC: 102 MMOL/L (ref 98–107)
CO2: 28 MMOL/L (ref 20–31)
CREAT SERPL-MCNC: 0.66 MG/DL (ref 0.5–0.9)
DIFFERENTIAL TYPE: ABNORMAL
EKG ATRIAL RATE: 94 BPM
EKG Q-T INTERVAL: 350 MS
EKG QRS DURATION: 60 MS
EKG QTC CALCULATION (BAZETT): 442 MS
EKG R AXIS: -14 DEGREES
EKG T AXIS: 11 DEGREES
EKG VENTRICULAR RATE: 96 BPM
EOSINOPHILS RELATIVE PERCENT: 2 % (ref 1–4)
GFR AFRICAN AMERICAN: >60 ML/MIN
GFR NON-AFRICAN AMERICAN: >60 ML/MIN
GFR SERPL CREATININE-BSD FRML MDRD: NORMAL ML/MIN/{1.73_M2}
GFR SERPL CREATININE-BSD FRML MDRD: NORMAL ML/MIN/{1.73_M2}
GLUCOSE BLD-MCNC: 90 MG/DL (ref 70–99)
HCT VFR BLD CALC: 33.2 % (ref 36.3–47.1)
HEMOGLOBIN: 10.1 G/DL (ref 11.9–15.1)
IMMATURE GRANULOCYTES: 0 %
INR BLD: 1 (ref 0.9–1.2)
LYMPHOCYTES # BLD: 31 % (ref 24–43)
MCH RBC QN AUTO: 25.8 PG (ref 25.2–33.5)
MCHC RBC AUTO-ENTMCNC: 30.4 G/DL (ref 28.4–34.8)
MCV RBC AUTO: 84.9 FL (ref 82.6–102.9)
MONOCYTES # BLD: 11 % (ref 3–12)
NRBC AUTOMATED: 0 PER 100 WBC
PARTIAL THROMBOPLASTIN TIME: 27.5 SEC (ref 23.2–34.4)
PDW BLD-RTO: 17.9 % (ref 11.8–14.4)
PLATELET # BLD: 275 K/UL (ref 138–453)
PLATELET ESTIMATE: ABNORMAL
PMV BLD AUTO: 9.3 FL (ref 8.1–13.5)
POTASSIUM SERPL-SCNC: 4.2 MMOL/L (ref 3.7–5.3)
PRO-BNP: 492 PG/ML
PROTHROMBIN TIME: 10 SEC (ref 9.7–12.2)
RBC # BLD: 3.91 M/UL (ref 3.95–5.11)
RBC # BLD: ABNORMAL 10*6/UL
SEG NEUTROPHILS: 56 % (ref 36–65)
SEGMENTED NEUTROPHILS ABSOLUTE COUNT: 2.68 K/UL (ref 1.5–8.1)
SODIUM BLD-SCNC: 139 MMOL/L (ref 135–144)
TROPONIN INTERP: NORMAL
TROPONIN T: <0.03 NG/ML
TROPONIN, HIGH SENSITIVITY: NORMAL NG/L (ref 0–14)
WBC # BLD: 4.9 K/UL (ref 3.5–11.3)
WBC # BLD: ABNORMAL 10*3/UL

## 2018-12-28 PROCEDURE — 71260 CT THORAX DX C+: CPT

## 2018-12-28 PROCEDURE — 85025 COMPLETE CBC W/AUTO DIFF WBC: CPT

## 2018-12-28 PROCEDURE — 85730 THROMBOPLASTIN TIME PARTIAL: CPT

## 2018-12-28 PROCEDURE — 2580000003 HC RX 258: Performed by: EMERGENCY MEDICINE

## 2018-12-28 PROCEDURE — 71045 X-RAY EXAM CHEST 1 VIEW: CPT

## 2018-12-28 PROCEDURE — 94664 DEMO&/EVAL PT USE INHALER: CPT

## 2018-12-28 PROCEDURE — 96375 TX/PRO/DX INJ NEW DRUG ADDON: CPT

## 2018-12-28 PROCEDURE — 84484 ASSAY OF TROPONIN QUANT: CPT

## 2018-12-28 PROCEDURE — 80048 BASIC METABOLIC PNL TOTAL CA: CPT

## 2018-12-28 PROCEDURE — 99285 EMERGENCY DEPT VISIT HI MDM: CPT

## 2018-12-28 PROCEDURE — 36415 COLL VENOUS BLD VENIPUNCTURE: CPT

## 2018-12-28 PROCEDURE — 6360000002 HC RX W HCPCS: Performed by: PHYSICIAN ASSISTANT

## 2018-12-28 PROCEDURE — 85610 PROTHROMBIN TIME: CPT

## 2018-12-28 PROCEDURE — 6360000004 HC RX CONTRAST MEDICATION: Performed by: EMERGENCY MEDICINE

## 2018-12-28 PROCEDURE — 6360000002 HC RX W HCPCS: Performed by: EMERGENCY MEDICINE

## 2018-12-28 PROCEDURE — 83880 ASSAY OF NATRIURETIC PEPTIDE: CPT

## 2018-12-28 PROCEDURE — 6370000000 HC RX 637 (ALT 250 FOR IP): Performed by: EMERGENCY MEDICINE

## 2018-12-28 PROCEDURE — 94640 AIRWAY INHALATION TREATMENT: CPT

## 2018-12-28 PROCEDURE — 93005 ELECTROCARDIOGRAM TRACING: CPT

## 2018-12-28 PROCEDURE — 96365 THER/PROPH/DIAG IV INF INIT: CPT

## 2018-12-28 RX ORDER — MOXIFLOXACIN HYDROCHLORIDE 400 MG/1
400 TABLET ORAL DAILY
Status: DISCONTINUED | OUTPATIENT
Start: 2018-12-29 | End: 2018-12-28

## 2018-12-28 RX ORDER — METHYLPREDNISOLONE SODIUM SUCCINATE 125 MG/2ML
125 INJECTION, POWDER, LYOPHILIZED, FOR SOLUTION INTRAMUSCULAR; INTRAVENOUS ONCE
Status: COMPLETED | OUTPATIENT
Start: 2018-12-28 | End: 2018-12-28

## 2018-12-28 RX ORDER — SODIUM CHLORIDE, SODIUM LACTATE, POTASSIUM CHLORIDE, CALCIUM CHLORIDE 600; 310; 30; 20 MG/100ML; MG/100ML; MG/100ML; MG/100ML
1000 INJECTION, SOLUTION INTRAVENOUS ONCE
Status: COMPLETED | OUTPATIENT
Start: 2018-12-28 | End: 2018-12-28

## 2018-12-28 RX ORDER — IPRATROPIUM BROMIDE AND ALBUTEROL SULFATE 2.5; .5 MG/3ML; MG/3ML
3 SOLUTION RESPIRATORY (INHALATION) ONCE
Status: COMPLETED | OUTPATIENT
Start: 2018-12-28 | End: 2018-12-28

## 2018-12-28 RX ORDER — MORPHINE SULFATE 2 MG/ML
2 INJECTION, SOLUTION INTRAMUSCULAR; INTRAVENOUS ONCE
Status: COMPLETED | OUTPATIENT
Start: 2018-12-28 | End: 2018-12-28

## 2018-12-28 RX ORDER — ALBUTEROL SULFATE 2.5 MG/3ML
2.5 SOLUTION RESPIRATORY (INHALATION)
Status: DISCONTINUED | OUTPATIENT
Start: 2018-12-28 | End: 2018-12-28 | Stop reason: HOSPADM

## 2018-12-28 RX ORDER — MOXIFLOXACIN HYDROCHLORIDE 400 MG/1
400 TABLET ORAL DAILY
Qty: 10 TABLET | Refills: 0 | Status: SHIPPED | OUTPATIENT
Start: 2018-12-28 | End: 2019-01-07

## 2018-12-28 RX ORDER — LEVOFLOXACIN 500 MG/1
500 TABLET, FILM COATED ORAL DAILY
Status: DISCONTINUED | OUTPATIENT
Start: 2018-12-28 | End: 2018-12-28 | Stop reason: HOSPADM

## 2018-12-28 RX ADMIN — IOVERSOL 75 ML: 741 INJECTION INTRA-ARTERIAL; INTRAVENOUS at 20:17

## 2018-12-28 RX ADMIN — IPRATROPIUM BROMIDE AND ALBUTEROL SULFATE 3 AMPULE: .5; 3 SOLUTION RESPIRATORY (INHALATION) at 17:48

## 2018-12-28 RX ADMIN — METHYLPREDNISOLONE SODIUM SUCCINATE 125 MG: 125 INJECTION, POWDER, FOR SOLUTION INTRAMUSCULAR; INTRAVENOUS at 18:01

## 2018-12-28 RX ADMIN — MORPHINE SULFATE 2 MG: 2 INJECTION, SOLUTION INTRAMUSCULAR; INTRAVENOUS at 20:23

## 2018-12-28 RX ADMIN — SODIUM CHLORIDE, POTASSIUM CHLORIDE, SODIUM LACTATE AND CALCIUM CHLORIDE 1000 ML: 600; 310; 30; 20 INJECTION, SOLUTION INTRAVENOUS at 18:10

## 2018-12-28 RX ADMIN — LEVOFLOXACIN 500 MG: 500 TABLET, FILM COATED ORAL at 21:45

## 2018-12-28 ASSESSMENT — PAIN DESCRIPTION - FREQUENCY: FREQUENCY: CONTINUOUS

## 2018-12-28 ASSESSMENT — PAIN DESCRIPTION - DESCRIPTORS: DESCRIPTORS: ACHING

## 2018-12-28 ASSESSMENT — PAIN SCALES - GENERAL
PAINLEVEL_OUTOF10: 7
PAINLEVEL_OUTOF10: 10

## 2018-12-28 ASSESSMENT — PAIN DESCRIPTION - LOCATION: LOCATION: ARM

## 2018-12-28 ASSESSMENT — PAIN DESCRIPTION - PAIN TYPE: TYPE: ACUTE PAIN

## 2018-12-28 ASSESSMENT — PAIN DESCRIPTION - ORIENTATION: ORIENTATION: LEFT

## 2018-12-28 ASSESSMENT — PAIN DESCRIPTION - ONSET: ONSET: ON-GOING

## 2019-03-20 ENCOUNTER — HOSPITAL ENCOUNTER (OUTPATIENT)
Age: 74
Discharge: HOME | End: 2019-03-20
Payer: MEDICARE

## 2019-03-20 LAB
ABSOLUTE EOS #: 0.09 K/UL (ref 0–0.44)
ABSOLUTE IMMATURE GRANULOCYTE: <0.03 K/UL (ref 0–0.3)
ABSOLUTE LYMPH #: 1.44 K/UL (ref 1.1–3.7)
ABSOLUTE MONO #: 0.35 K/UL (ref 0.1–1.2)
ALBUMIN SERPL-MCNC: 4.2 G/DL (ref 3.5–5.2)
ALBUMIN/GLOBULIN RATIO: 1.4 (ref 1–2.5)
ALP BLD-CCNC: 76 U/L (ref 35–104)
ALT SERPL-CCNC: 25 U/L (ref 5–33)
AST SERPL-CCNC: 38 U/L
BASOPHILS # BLD: 1 % (ref 0–2)
BASOPHILS ABSOLUTE: 0.04 K/UL (ref 0–0.2)
BILIRUB SERPL-MCNC: <0.1 MG/DL (ref 0.3–1.2)
BILIRUBIN DIRECT: <0.08 MG/DL
BILIRUBIN, INDIRECT: ABNORMAL MG/DL (ref 0–1)
CREAT SERPL-MCNC: 0.73 MG/DL (ref 0.5–0.9)
DIFFERENTIAL TYPE: ABNORMAL
EOSINOPHILS RELATIVE PERCENT: 3 % (ref 1–4)
GFR AFRICAN AMERICAN: >60 ML/MIN
GFR NON-AFRICAN AMERICAN: >60 ML/MIN
GFR SERPL CREATININE-BSD FRML MDRD: NORMAL ML/MIN/{1.73_M2}
GFR SERPL CREATININE-BSD FRML MDRD: NORMAL ML/MIN/{1.73_M2}
GLOBULIN: ABNORMAL G/DL (ref 1.5–3.8)
HCT VFR BLD CALC: 33.6 % (ref 36.3–47.1)
HEMOGLOBIN: 10.1 G/DL (ref 11.9–15.1)
IMMATURE GRANULOCYTES: 0 %
LYMPHOCYTES # BLD: 44 % (ref 24–43)
MCH RBC QN AUTO: 26.2 PG (ref 25.2–33.5)
MCHC RBC AUTO-ENTMCNC: 30.1 G/DL (ref 28.4–34.8)
MCV RBC AUTO: 87.3 FL (ref 82.6–102.9)
MONOCYTES # BLD: 11 % (ref 3–12)
NRBC AUTOMATED: 0 PER 100 WBC
PDW BLD-RTO: 17.2 % (ref 11.8–14.4)
PLATELET # BLD: 247 K/UL (ref 138–453)
PLATELET ESTIMATE: ABNORMAL
PMV BLD AUTO: 10.8 FL (ref 8.1–13.5)
RBC # BLD: 3.85 M/UL (ref 3.95–5.11)
RBC # BLD: ABNORMAL 10*6/UL
SEG NEUTROPHILS: 41 % (ref 36–65)
SEGMENTED NEUTROPHILS ABSOLUTE COUNT: 1.33 K/UL (ref 1.5–8.1)
TOTAL PROTEIN: 7.1 G/DL (ref 6.4–8.3)
WBC # BLD: 3.3 K/UL (ref 3.5–11.3)
WBC # BLD: ABNORMAL 10*3/UL

## 2019-03-20 PROCEDURE — 82565 ASSAY OF CREATININE: CPT

## 2019-03-20 PROCEDURE — 80076 HEPATIC FUNCTION PANEL: CPT

## 2019-03-20 PROCEDURE — 36415 COLL VENOUS BLD VENIPUNCTURE: CPT

## 2019-03-20 PROCEDURE — 85025 COMPLETE CBC W/AUTO DIFF WBC: CPT

## 2019-04-29 ENCOUNTER — HOSPITAL ENCOUNTER (OUTPATIENT)
Age: 74
Discharge: HOME | End: 2019-04-29
Payer: MEDICARE

## 2019-04-29 LAB
ABSOLUTE EOS #: 0.14 K/UL (ref 0–0.44)
ABSOLUTE IMMATURE GRANULOCYTE: <0.03 K/UL (ref 0–0.3)
ABSOLUTE LYMPH #: 1.62 K/UL (ref 1.1–3.7)
ABSOLUTE MONO #: 0.39 K/UL (ref 0.1–1.2)
ALBUMIN SERPL-MCNC: 4 G/DL (ref 3.5–5.2)
ALBUMIN/GLOBULIN RATIO: 1.4 (ref 1–2.5)
ALP BLD-CCNC: 70 U/L (ref 35–104)
ALT SERPL-CCNC: 28 U/L (ref 5–33)
AST SERPL-CCNC: 42 U/L
BASOPHILS # BLD: 1 % (ref 0–2)
BASOPHILS ABSOLUTE: 0.04 K/UL (ref 0–0.2)
BILIRUB SERPL-MCNC: 0.16 MG/DL (ref 0.3–1.2)
BILIRUBIN DIRECT: <0.08 MG/DL
BILIRUBIN, INDIRECT: ABNORMAL MG/DL (ref 0–1)
CREAT SERPL-MCNC: 0.85 MG/DL (ref 0.5–0.9)
DIFFERENTIAL TYPE: ABNORMAL
EOSINOPHILS RELATIVE PERCENT: 4 % (ref 1–4)
GFR AFRICAN AMERICAN: >60 ML/MIN
GFR NON-AFRICAN AMERICAN: >60 ML/MIN
GFR SERPL CREATININE-BSD FRML MDRD: NORMAL ML/MIN/{1.73_M2}
GFR SERPL CREATININE-BSD FRML MDRD: NORMAL ML/MIN/{1.73_M2}
GLOBULIN: ABNORMAL G/DL (ref 1.5–3.8)
HCT VFR BLD CALC: 37.3 % (ref 36.3–47.1)
HEMOGLOBIN: 11.4 G/DL (ref 11.9–15.1)
IMMATURE GRANULOCYTES: 0 %
LYMPHOCYTES # BLD: 41 % (ref 24–43)
MCH RBC QN AUTO: 27.7 PG (ref 25.2–33.5)
MCHC RBC AUTO-ENTMCNC: 30.6 G/DL (ref 28.4–34.8)
MCV RBC AUTO: 90.8 FL (ref 82.6–102.9)
MONOCYTES # BLD: 10 % (ref 3–12)
NRBC AUTOMATED: 0 PER 100 WBC
PDW BLD-RTO: 19.3 % (ref 11.8–14.4)
PLATELET # BLD: 215 K/UL (ref 138–453)
PLATELET ESTIMATE: ABNORMAL
PMV BLD AUTO: 9.6 FL (ref 8.1–13.5)
RBC # BLD: 4.11 M/UL (ref 3.95–5.11)
RBC # BLD: ABNORMAL 10*6/UL
SEG NEUTROPHILS: 44 % (ref 36–65)
SEGMENTED NEUTROPHILS ABSOLUTE COUNT: 1.74 K/UL (ref 1.5–8.1)
TOTAL PROTEIN: 6.9 G/DL (ref 6.4–8.3)
WBC # BLD: 3.9 K/UL (ref 3.5–11.3)
WBC # BLD: ABNORMAL 10*3/UL

## 2019-04-29 PROCEDURE — 82565 ASSAY OF CREATININE: CPT

## 2019-04-29 PROCEDURE — 85025 COMPLETE CBC W/AUTO DIFF WBC: CPT

## 2019-04-29 PROCEDURE — 36415 COLL VENOUS BLD VENIPUNCTURE: CPT

## 2019-04-29 PROCEDURE — 80076 HEPATIC FUNCTION PANEL: CPT

## 2019-05-14 NOTE — PLAN OF CARE
Problem: Pain:  Goal: Control of acute pain  Control of acute pain   Outcome: Ongoing  Frequent pain assessment provided. Pt denies pain thus far this shift. Problem: Gas Exchange - Impaired:  Goal: Levels of oxygenation will improve  Levels of oxygenation will improve  Outcome: Ongoing  Pt on continuous pulse oximetry. Pt was maintaining SPO2 above 90% on oxygen at 1 liter per minute via nasal cannula throughout the night. Oxygen removed, pt continues to maintain SPO2 above 90% at this time. Will continue to monitor. for color change and rash. Neurological: Negative for dizziness, tremors, seizures, syncope, speech difficulty, weakness, light-headedness, numbness and headaches. Except as noted above the remainder of the review of systems was reviewed and negative. PAST MEDICAL HISTORY     Past Medical History:   Diagnosis Date    Asthma      History reviewed. No pertinent surgical history. Social History     Socioeconomic History    Marital status: Single     Spouse name: None    Number of children: None    Years of education: None    Highest education level: None   Occupational History    None   Social Needs    Financial resource strain: None    Food insecurity:     Worry: None     Inability: None    Transportation needs:     Medical: None     Non-medical: None   Tobacco Use    Smoking status: Current Every Day Smoker     Types: Cigars    Smokeless tobacco: Never Used   Substance and Sexual Activity    Alcohol use: No    Drug use: Not Currently    Sexual activity: None   Lifestyle    Physical activity:     Days per week: None     Minutes per session: None    Stress: None   Relationships    Social connections:     Talks on phone: None     Gets together: None     Attends Quaker service: None     Active member of club or organization: None     Attends meetings of clubs or organizations: None     Relationship status: None    Intimate partner violence:     Fear of current or ex partner: None     Emotionally abused: None     Physically abused: None     Forced sexual activity: None   Other Topics Concern    None   Social History Narrative    None       SCREENINGS      @FLOW([id number])@      PHYSICAL EXAM    (up to 7 for level 4, 8 or more for level 5)     ED Triage Vitals [05/13/19 1812]   BP Temp Temp Source Pulse Resp SpO2 Height Weight   (!) 106/56 98.3 °F (36.8 °C) Oral 83 16 96 % 5' 7\" (1.702 m) 140 lb (63.5 kg)       Physical Exam   Constitutional: He is oriented to person, place, and time. He appears well-developed and well-nourished. No distress. Cardiovascular: Normal rate, regular rhythm and intact distal pulses. Pulmonary/Chest: Effort normal.   Musculoskeletal: Normal range of motion. Right hand: He exhibits tenderness and swelling. He exhibits normal range of motion. Normal sensation noted. Normal strength noted. Hands:  Neurological: He is alert and oriented to person, place, and time. Skin: Skin is warm and dry. Capillary refill takes less than 2 seconds. He is not diaphoretic. DIAGNOSTIC RESULTS     EKG: All EKG's are interpreted by the Emergency Department Physician who either signs or Co-signsthis chart in the absence of a cardiologist.        RADIOLOGY:   Juanell Dasen such as CT, Ultrasound and MRI are read by the radiologist. Plain radiographic images are visualized and preliminarily interpreted by the emergency physician with the below findings:        Interpretation per the Radiologist below, if available at the time ofthis note:    No orders to display         ED BEDSIDE ULTRASOUND:   Performed by ED Physician - none    LABS:  Labs Reviewed - No data to display    All other labs were within normal range or not returned as of this dictation. EMERGENCY DEPARTMENT COURSE and DIFFERENTIAL DIAGNOSIS/MDM:   Vitals:    Vitals:    05/13/19 1812   BP: (!) 106/56   Pulse: 83   Resp: 16   Temp: 98.3 °F (36.8 °C)   TempSrc: Oral   SpO2: 96%   Weight: 140 lb (63.5 kg)   Height: 5' 7\" (1.702 m)            MDM patient is afebrile nontoxic no acute distress and mechanically stable. There is obvious evidence paronychia of the end of the right middle finger. Area was cleaned place an ice water to decrease swelling. The area was then cleaned again and placed with EMLA cream.  Small incision was made with a 22-gauge needle with scant amount of bloody and purulent drainage. Patient was provided with Percocet for pain and discomfort. Area was wrapped with bandage. Patient will be discharged with additional medication for pain and antibiotic coverage. Is encouraged follow primary care providers as possible further evaluation return to the ER for any onset of new concerning symptoms or worsening condition. Patient verbalized percent of all given instruction education. CRITICAL CARE TIME       CONSULTS:  None    PROCEDURES:  Unless otherwise noted below, none     Incision/Drainage  Date/Time: 5/13/2019 8:11 PM  Performed by: SYLVESTER Cortez CNP  Authorized by: SYLVESTER Cortez CNP     Consent:     Consent obtained:  Verbal    Consent given by:  Patient    Risks discussed:  Bleeding, pain and incomplete drainage  Location:     Type:  Abscess (Paronychia)    Location:  Upper extremity    Upper extremity location:  Finger    Finger location:  R long finger  Pre-procedure details:     Skin preparation:  Betadine  Anesthesia (see MAR for exact dosages): Anesthesia method:  Topical application    Topical anesthetic:  EMLA cream  Procedure type:     Complexity:  Simple  Procedure details:     Needle aspiration: no      Incision types:  Stab incision    Incision depth:  Dermal    Scalpel size: 22g needle. Wound management:  Probed and deloculated    Drainage:  Bloody and purulent    Drainage amount:  Scant    Wound treatment:  Wound left open    Packing materials:  None  Post-procedure details:     Patient tolerance of procedure: Tolerated well, no immediate complications        FINAL IMPRESSION      1.  Paronychia of finger of right hand          DISPOSITION/PLAN   DISPOSITION Discharge - Pending Orders Complete 05/13/2019 08:06:39 PM      PATIENT REFERRED TO:  [facility]  [address] [facility] [place]  [phone]  Call in 1 day        DISCHARGE MEDICATIONS:  New Prescriptions    CEPHALEXIN (KEFLEX) 500 MG CAPSULE    Take 1 capsule by mouth 4 times daily for 7 days    IBUPROFEN (ADVIL;MOTRIN) 600 MG TABLET    Take 1 tablet by mouth every 6 hours as needed for Pain    MUPIROCIN (BACTROBAN) 2 % OINTMENT    Apply topically 3 times daily. OXYCODONE-ACETAMINOPHEN (PERCOCET) 5-325 MG PER TABLET    Take 1 tablet by mouth every 6 hours as needed for Pain for up to 1 day. Intended supply: 3 days.  Take lowest dose possible to manage pain          (Please notethat portions of this note were completed with a voice recognition program.  Efforts were made to edit the dictations but occasionally words are mis-transcribed.)    SYLVESTER Hays CNP (electronically signed)  Attending Emergency Physician         SYLVESTER Hays CNP  05/13/19 2013

## 2019-05-17 ENCOUNTER — HOSPITAL ENCOUNTER (OUTPATIENT)
Age: 74
Discharge: HOME | End: 2019-05-17
Payer: MEDICARE

## 2019-05-17 LAB
ABSOLUTE EOS #: <0.03 K/UL (ref 0–0.44)
ABSOLUTE IMMATURE GRANULOCYTE: <0.03 K/UL (ref 0–0.3)
ABSOLUTE LYMPH #: 0.85 K/UL (ref 1.1–3.7)
ABSOLUTE MONO #: 0.27 K/UL (ref 0.1–1.2)
BASOPHILS # BLD: 1 % (ref 0–2)
BASOPHILS ABSOLUTE: 0.03 K/UL (ref 0–0.2)
C-REACTIVE PROTEIN: 1.8 MG/L (ref 0–5)
DIFFERENTIAL TYPE: ABNORMAL
EOSINOPHILS RELATIVE PERCENT: 0 % (ref 1–4)
HCT VFR BLD CALC: 41.7 % (ref 36.3–47.1)
HEMOGLOBIN: 13 G/DL (ref 11.9–15.1)
IMMATURE GRANULOCYTES: 0 %
LYMPHOCYTES # BLD: 13 % (ref 24–43)
MCH RBC QN AUTO: 29.3 PG (ref 25.2–33.5)
MCHC RBC AUTO-ENTMCNC: 31.2 G/DL (ref 28.4–34.8)
MCV RBC AUTO: 93.9 FL (ref 82.6–102.9)
MONOCYTES # BLD: 4 % (ref 3–12)
NRBC AUTOMATED: 0 PER 100 WBC
PDW BLD-RTO: 18.6 % (ref 11.8–14.4)
PLATELET # BLD: 273 K/UL (ref 138–453)
PLATELET ESTIMATE: ABNORMAL
PMV BLD AUTO: 9.7 FL (ref 8.1–13.5)
RBC # BLD: 4.44 M/UL (ref 3.95–5.11)
RBC # BLD: ABNORMAL 10*6/UL
SEDIMENTATION RATE, ERYTHROCYTE: 4 MM (ref 0–20)
SEG NEUTROPHILS: 82 % (ref 36–65)
SEGMENTED NEUTROPHILS ABSOLUTE COUNT: 5.31 K/UL (ref 1.5–8.1)
WBC # BLD: 6.5 K/UL (ref 3.5–11.3)
WBC # BLD: ABNORMAL 10*3/UL

## 2019-05-17 PROCEDURE — 85025 COMPLETE CBC W/AUTO DIFF WBC: CPT

## 2019-05-17 PROCEDURE — 86140 C-REACTIVE PROTEIN: CPT

## 2019-05-17 PROCEDURE — 85651 RBC SED RATE NONAUTOMATED: CPT

## 2019-05-17 PROCEDURE — 36415 COLL VENOUS BLD VENIPUNCTURE: CPT

## 2019-06-10 ENCOUNTER — HOSPITAL ENCOUNTER (OUTPATIENT)
Age: 74
Discharge: HOME | End: 2019-06-10
Payer: MEDICARE

## 2019-06-10 LAB
ABSOLUTE EOS #: 0.31 K/UL (ref 0–0.44)
ABSOLUTE IMMATURE GRANULOCYTE: <0.03 K/UL (ref 0–0.3)
ABSOLUTE LYMPH #: 1.08 K/UL (ref 1.1–3.7)
ABSOLUTE MONO #: 0.44 K/UL (ref 0.1–1.2)
ALBUMIN SERPL-MCNC: 3.6 G/DL (ref 3.5–5.2)
ALBUMIN/GLOBULIN RATIO: 1.2 (ref 1–2.5)
ALP BLD-CCNC: 77 U/L (ref 35–104)
ALT SERPL-CCNC: 43 U/L (ref 5–33)
AST SERPL-CCNC: 27 U/L
BASOPHILS # BLD: 1 % (ref 0–2)
BASOPHILS ABSOLUTE: 0.03 K/UL (ref 0–0.2)
BILIRUB SERPL-MCNC: 0.27 MG/DL (ref 0.3–1.2)
BILIRUBIN DIRECT: <0.08 MG/DL
BILIRUBIN, INDIRECT: ABNORMAL MG/DL (ref 0–1)
DIFFERENTIAL TYPE: ABNORMAL
EOSINOPHILS RELATIVE PERCENT: 8 % (ref 1–4)
GLOBULIN: ABNORMAL G/DL (ref 1.5–3.8)
HCT VFR BLD CALC: 36 % (ref 36.3–47.1)
HEMOGLOBIN: 11.4 G/DL (ref 11.9–15.1)
IMMATURE GRANULOCYTES: 0 %
LYMPHOCYTES # BLD: 27 % (ref 24–43)
MCH RBC QN AUTO: 30.3 PG (ref 25.2–33.5)
MCHC RBC AUTO-ENTMCNC: 31.7 G/DL (ref 28.4–34.8)
MCV RBC AUTO: 95.7 FL (ref 82.6–102.9)
MONOCYTES # BLD: 11 % (ref 3–12)
NRBC AUTOMATED: 0 PER 100 WBC
PDW BLD-RTO: 15.9 % (ref 11.8–14.4)
PLATELET # BLD: 288 K/UL (ref 138–453)
PLATELET ESTIMATE: ABNORMAL
PMV BLD AUTO: 10.1 FL (ref 8.1–13.5)
RBC # BLD: 3.76 M/UL (ref 3.95–5.11)
RBC # BLD: ABNORMAL 10*6/UL
SEG NEUTROPHILS: 53 % (ref 36–65)
SEGMENTED NEUTROPHILS ABSOLUTE COUNT: 2.11 K/UL (ref 1.5–8.1)
TOTAL PROTEIN: 6.6 G/DL (ref 6.4–8.3)
WBC # BLD: 4 K/UL (ref 3.5–11.3)
WBC # BLD: ABNORMAL 10*3/UL

## 2019-06-10 PROCEDURE — 36415 COLL VENOUS BLD VENIPUNCTURE: CPT

## 2019-06-10 PROCEDURE — 80076 HEPATIC FUNCTION PANEL: CPT

## 2019-06-10 PROCEDURE — 85025 COMPLETE CBC W/AUTO DIFF WBC: CPT

## 2019-06-14 ENCOUNTER — APPOINTMENT (OUTPATIENT)
Dept: GENERAL RADIOLOGY | Age: 74
End: 2019-06-14
Payer: MEDICARE

## 2019-06-14 ENCOUNTER — HOSPITAL ENCOUNTER (EMERGENCY)
Age: 74
Discharge: HOME | End: 2019-06-14
Payer: MEDICARE

## 2019-06-14 VITALS
DIASTOLIC BLOOD PRESSURE: 92 MMHG | RESPIRATION RATE: 16 BRPM | TEMPERATURE: 98 F | HEART RATE: 80 BPM | OXYGEN SATURATION: 97 % | SYSTOLIC BLOOD PRESSURE: 154 MMHG

## 2019-06-14 DIAGNOSIS — S63.502A SPRAIN OF LEFT WRIST, INITIAL ENCOUNTER: Primary | ICD-10-CM

## 2019-06-14 PROCEDURE — 6370000000 HC RX 637 (ALT 250 FOR IP): Performed by: PHYSICIAN ASSISTANT

## 2019-06-14 PROCEDURE — 73110 X-RAY EXAM OF WRIST: CPT

## 2019-06-14 PROCEDURE — 99283 EMERGENCY DEPT VISIT LOW MDM: CPT

## 2019-06-14 RX ORDER — HYDROCODONE BITARTRATE AND ACETAMINOPHEN 5; 325 MG/1; MG/1
1 TABLET ORAL ONCE
Status: COMPLETED | OUTPATIENT
Start: 2019-06-14 | End: 2019-06-14

## 2019-06-14 RX ADMIN — HYDROCODONE BITARTRATE AND ACETAMINOPHEN 1 TABLET: 5; 325 TABLET ORAL at 11:17

## 2019-06-14 ASSESSMENT — PAIN DESCRIPTION - LOCATION: LOCATION: WRIST

## 2019-06-14 ASSESSMENT — PAIN DESCRIPTION - ORIENTATION: ORIENTATION: LEFT

## 2019-06-14 ASSESSMENT — PAIN SCALES - GENERAL
PAINLEVEL_OUTOF10: 9
PAINLEVEL_OUTOF10: 9

## 2019-06-14 ASSESSMENT — PAIN DESCRIPTION - FREQUENCY: FREQUENCY: CONTINUOUS

## 2019-06-14 ASSESSMENT — PAIN DESCRIPTION - PAIN TYPE: TYPE: ACUTE PAIN

## 2019-06-14 ASSESSMENT — PAIN DESCRIPTION - DESCRIPTORS: DESCRIPTORS: ACHING;SHARP;DISCOMFORT

## 2019-06-14 NOTE — ED PROVIDER NOTES
[facility] ED  EMERGENCY DEPARTMENT ENCOUNTER      Pt Name: Edison Up  MRN: [id number]  [place] 1945  Date of evaluation: 6/14/2019  Provider: Molly Galeano PA-C    CHIEF COMPLAINT       Chief Complaint   Patient presents with    Wrist Injury     onset today; fell backwards and used her left arm to catch herself       HISTORY OF PRESENT ILLNESS    Edison Up is a 68 y.o. female who presents to the emergency department from home running of pain in the left wrist she states she lost her balance standing in her garage and fell backwards catching herself with her left wrist.  She states she has chronic pain in her wrist from a fracture which was repaired last year and she states the repair was not appropriate and the bones are of different length and the hardware is causing her a lot of discomfort. She has an appointment with an orthopedist to have surgery in 1 month to revise the previous surgery. Denies distal numbness or tingling denies neck or back pain states she has severe pain in her left wrist      Triage notes and Nursing notes were reviewed by myself. Any discrepancies are addressed above.     PAST MEDICAL HISTORY     Past Medical History:   Diagnosis Date    Depression     GERD (gastroesophageal reflux disease)     Osteoporosis        SURGICAL HISTORY       Past Surgical History:   Procedure Laterality Date    BACK SURGERY      BREAST SURGERY      CARPAL TUNNEL RELEASE      FRACTURE SURGERY Left 12/20/2018    ORIF wrist    HYSTERECTOMY      JOINT REPLACEMENT      right knee    JOINT REPLACEMENT      WRIST FRACTURE SURGERY Left 12/20/2018    WRIST OPEN REDUCTION INTERNAL FIXATION-DISTAL RADIUS performed by Enrique Saba MD at [facility]       Discharge Medication List as of 6/14/2019 11:39 AM      CONTINUE these medications which have NOT CHANGED    Details   furosemide (LASIX) 40 MG tablet Take 40 mg by mouth as neededHistorical Med  Physical activity:     Days per week: Not on file     Minutes per session: Not on file    Stress: Not on file   Relationships    Social connections:     Talks on phone: Not on file     Gets together: Not on file     Attends Moravian service: Not on file     Active member of club or organization: Not on file     Attends meetings of clubs or organizations: Not on file     Relationship status: Not on file    Intimate partner violence:     Fear of current or ex partner: Not on file     Emotionally abused: Not on file     Physically abused: Not on file     Forced sexual activity: Not on file   Other Topics Concern    Not on file   Social History Narrative    Not on file       REVIEW OF SYSTEMS     Review of Systems    Except as noted above the remainder of the review of systems was reviewed and is negative. SCREENINGS           PHYSICAL EXAM    (up to 7 for level 4, 8 or more for level 5)     ED Triage Vitals [06/14/19 1054]   BP Temp Temp Source Pulse Resp SpO2 Height Weight   (!) 169/103 -- Tympanic 81 18 96 % -- --       Physical Exam  Active and oriented ×3. Nontoxic. No acute distress. Well-hydrated. Head is atraumatic, facies symmetrical.  Respirations nonlabored. Skin free of any obvious rashes or lesions. Left wrist with some chronic deformity of the distal ulna area but no acute edema or deformity noted. No erythema noted. Light touch sensation of the wrist in any area causes the patient to wince in pain she cannot tolerate any firm palpation. Distal capillary refill is intact distal sensation intact. Extremities without edema. DIAGNOSTIC RESULTS     RADIOLOGY: (none if blank)   Interpretation per the Radiologistbelow, if available at the time of this note:    XR WRIST LEFT (MIN 3 VIEWS)   Final Result   Persistent fracture status post volar plate and screw fixation distal radial   fracture. No evidence of healing of ulnar styloid fracture.                EMERGENCY DEPARTMENT 06/14/2019 11:54:42 AM      PATIENT REFERRED TO:  Chrissy Shepherd MD  [address] [facility]  [phone]    In 3 days  Recheck blood pressure      DISCHARGE MEDICATIONS:  Discharge Medication List as of 6/14/2019 11:39 AM                 (Please note that portions of this note were completed with a voice recognition program.  Efforts were made to edit the dictations but occasionallywords are mis-transcribed.)      Yvonne Collado PA-C (electronically signed)  Attending Emergency Department Provider         Vishnu Heredia II, PA-C  06/14/19 0420

## 2019-06-17 ENCOUNTER — HOSPITAL ENCOUNTER (OUTPATIENT)
Dept: GENERAL RADIOLOGY | Age: 74
Discharge: HOME | End: 2019-06-19
Payer: MEDICARE

## 2019-06-17 ENCOUNTER — HOSPITAL ENCOUNTER (OUTPATIENT)
Age: 74
Discharge: HOME | End: 2019-06-17
Payer: MEDICARE

## 2019-06-17 ENCOUNTER — HOSPITAL ENCOUNTER (OUTPATIENT)
Age: 74
Discharge: HOME | End: 2019-06-19
Payer: MEDICARE

## 2019-06-17 DIAGNOSIS — Z01.818 PREOPERATIVE CLEARANCE: ICD-10-CM

## 2019-06-17 LAB
ABSOLUTE EOS #: 0.14 K/UL (ref 0–0.44)
ABSOLUTE IMMATURE GRANULOCYTE: <0.03 K/UL (ref 0–0.3)
ABSOLUTE LYMPH #: 1.87 K/UL (ref 1.1–3.7)
ABSOLUTE MONO #: 0.35 K/UL (ref 0.1–1.2)
ANION GAP SERPL CALCULATED.3IONS-SCNC: 7 MMOL/L (ref 9–17)
BASOPHILS # BLD: 1 % (ref 0–2)
BASOPHILS ABSOLUTE: 0.04 K/UL (ref 0–0.2)
BUN BLDV-MCNC: 14 MG/DL (ref 8–23)
BUN/CREAT BLD: 18 (ref 9–20)
CALCIUM SERPL-MCNC: 9.6 MG/DL (ref 8.6–10.4)
CHLORIDE BLD-SCNC: 102 MMOL/L (ref 98–107)
CO2: 32 MMOL/L (ref 20–31)
CREAT SERPL-MCNC: 0.76 MG/DL (ref 0.5–0.9)
DIFFERENTIAL TYPE: ABNORMAL
EOSINOPHILS RELATIVE PERCENT: 3 % (ref 1–4)
GFR AFRICAN AMERICAN: >60 ML/MIN
GFR NON-AFRICAN AMERICAN: >60 ML/MIN
GFR SERPL CREATININE-BSD FRML MDRD: ABNORMAL ML/MIN/{1.73_M2}
GFR SERPL CREATININE-BSD FRML MDRD: ABNORMAL ML/MIN/{1.73_M2}
GLUCOSE BLD-MCNC: 78 MG/DL (ref 70–99)
HCT VFR BLD CALC: 38.9 % (ref 36.3–47.1)
HEMOGLOBIN: 12 G/DL (ref 11.9–15.1)
IMMATURE GRANULOCYTES: 0 %
LYMPHOCYTES # BLD: 41 % (ref 24–43)
MCH RBC QN AUTO: 29.6 PG (ref 25.2–33.5)
MCHC RBC AUTO-ENTMCNC: 30.8 G/DL (ref 28.4–34.8)
MCV RBC AUTO: 96 FL (ref 82.6–102.9)
MONOCYTES # BLD: 8 % (ref 3–12)
NRBC AUTOMATED: 0 PER 100 WBC
PDW BLD-RTO: 15.2 % (ref 11.8–14.4)
PLATELET # BLD: 361 K/UL (ref 138–453)
PLATELET ESTIMATE: ABNORMAL
PMV BLD AUTO: 9.1 FL (ref 8.1–13.5)
POTASSIUM SERPL-SCNC: 5.1 MMOL/L (ref 3.7–5.3)
RBC # BLD: 4.05 M/UL (ref 3.95–5.11)
RBC # BLD: ABNORMAL 10*6/UL
SEG NEUTROPHILS: 47 % (ref 36–65)
SEGMENTED NEUTROPHILS ABSOLUTE COUNT: 2.21 K/UL (ref 1.5–8.1)
SODIUM BLD-SCNC: 141 MMOL/L (ref 135–144)
WBC # BLD: 4.6 K/UL (ref 3.5–11.3)
WBC # BLD: ABNORMAL 10*3/UL

## 2019-06-17 PROCEDURE — 80048 BASIC METABOLIC PNL TOTAL CA: CPT

## 2019-06-17 PROCEDURE — 71046 X-RAY EXAM CHEST 2 VIEWS: CPT

## 2019-06-17 PROCEDURE — 93005 ELECTROCARDIOGRAM TRACING: CPT | Performed by: ORTHOPAEDIC SURGERY

## 2019-06-17 PROCEDURE — 85025 COMPLETE CBC W/AUTO DIFF WBC: CPT

## 2019-06-17 PROCEDURE — 36415 COLL VENOUS BLD VENIPUNCTURE: CPT

## 2019-06-18 LAB
EKG ATRIAL RATE: 57 BPM
EKG P AXIS: 67 DEGREES
EKG P-R INTERVAL: 180 MS
EKG Q-T INTERVAL: 436 MS
EKG QRS DURATION: 90 MS
EKG QTC CALCULATION (BAZETT): 424 MS
EKG R AXIS: 3 DEGREES
EKG T AXIS: 37 DEGREES
EKG VENTRICULAR RATE: 57 BPM

## 2019-06-18 PROCEDURE — 93010 ELECTROCARDIOGRAM REPORT: CPT | Performed by: INTERNAL MEDICINE

## 2019-06-24 ENCOUNTER — HOSPITAL ENCOUNTER (OUTPATIENT)
Dept: NON INVASIVE DIAGNOSTICS | Age: 74
Discharge: HOME | End: 2019-06-24
Payer: MEDICARE

## 2019-06-24 LAB
LV EF: 55 %
LVEF MODALITY: NORMAL

## 2019-06-24 PROCEDURE — 93306 TTE W/DOPPLER COMPLETE: CPT

## 2019-07-09 ENCOUNTER — APPOINTMENT (OUTPATIENT)
Dept: GENERAL RADIOLOGY | Age: 74
End: 2019-07-09
Payer: MEDICARE

## 2019-07-09 ENCOUNTER — HOSPITAL ENCOUNTER (EMERGENCY)
Age: 74
Discharge: HOME | End: 2019-07-09
Attending: EMERGENCY MEDICINE
Payer: MEDICARE

## 2019-07-09 VITALS
RESPIRATION RATE: 20 BRPM | DIASTOLIC BLOOD PRESSURE: 77 MMHG | TEMPERATURE: 98.9 F | HEART RATE: 82 BPM | BODY MASS INDEX: 26.22 KG/M2 | WEIGHT: 148 LBS | OXYGEN SATURATION: 95 % | SYSTOLIC BLOOD PRESSURE: 119 MMHG

## 2019-07-09 DIAGNOSIS — J06.9 UPPER RESPIRATORY TRACT INFECTION, UNSPECIFIED TYPE: Primary | ICD-10-CM

## 2019-07-09 DIAGNOSIS — J42 CHRONIC BRONCHITIS, UNSPECIFIED CHRONIC BRONCHITIS TYPE (HCC): ICD-10-CM

## 2019-07-09 LAB
-: ABNORMAL
ABSOLUTE EOS #: 0.1 K/UL (ref 0–0.44)
ABSOLUTE IMMATURE GRANULOCYTE: 0.03 K/UL (ref 0–0.3)
ABSOLUTE LYMPH #: 0.99 K/UL (ref 1.1–3.7)
ABSOLUTE MONO #: 0.88 K/UL (ref 0.1–1.2)
ALLEN TEST: ABNORMAL
AMORPHOUS: ABNORMAL
ANION GAP SERPL CALCULATED.3IONS-SCNC: 10 MMOL/L (ref 9–17)
BACTERIA: ABNORMAL
BASOPHILS # BLD: 0 % (ref 0–2)
BASOPHILS ABSOLUTE: 0.03 K/UL (ref 0–0.2)
BILIRUBIN URINE: NEGATIVE
BNP INTERPRETATION: NORMAL
BUN BLDV-MCNC: 18 MG/DL (ref 8–23)
BUN/CREAT BLD: 27 (ref 9–20)
CALCIUM SERPL-MCNC: 9.1 MG/DL (ref 8.6–10.4)
CARBOXYHEMOGLOBIN: ABNORMAL % (ref 0–5)
CASTS UA: ABNORMAL /LPF
CHLORIDE BLD-SCNC: 99 MMOL/L (ref 98–107)
CO2: 27 MMOL/L (ref 20–31)
COLOR: YELLOW
COMMENT UA: ABNORMAL
CREAT SERPL-MCNC: 0.67 MG/DL (ref 0.5–0.9)
CRYSTALS, UA: ABNORMAL /HPF
DIFFERENTIAL TYPE: ABNORMAL
EOSINOPHILS RELATIVE PERCENT: 1 % (ref 1–4)
EPITHELIAL CELLS UA: ABNORMAL /HPF (ref 0–25)
FIO2: 21
GFR AFRICAN AMERICAN: >60 ML/MIN
GFR NON-AFRICAN AMERICAN: >60 ML/MIN
GFR SERPL CREATININE-BSD FRML MDRD: ABNORMAL ML/MIN/{1.73_M2}
GFR SERPL CREATININE-BSD FRML MDRD: ABNORMAL ML/MIN/{1.73_M2}
GLUCOSE BLD-MCNC: 114 MG/DL (ref 70–99)
GLUCOSE URINE: NEGATIVE
HCO3 VENOUS: 29.8 MMOL/L (ref 24–30)
HCT VFR BLD CALC: 35.3 % (ref 36.3–47.1)
HEMOGLOBIN: 11.6 G/DL (ref 11.9–15.1)
IMMATURE GRANULOCYTES: 0 %
KETONES, URINE: NEGATIVE
LEUKOCYTE ESTERASE, URINE: ABNORMAL
LYMPHOCYTES # BLD: 10 % (ref 24–43)
MAGNESIUM: 2.2 MG/DL (ref 1.6–2.6)
MCH RBC QN AUTO: 30.7 PG (ref 25.2–33.5)
MCHC RBC AUTO-ENTMCNC: 32.9 G/DL (ref 28.4–34.8)
MCV RBC AUTO: 93.4 FL (ref 82.6–102.9)
METHEMOGLOBIN: ABNORMAL % (ref 0–1.9)
MODE: ABNORMAL
MONOCYTES # BLD: 8 % (ref 3–12)
MUCUS: ABNORMAL
NEGATIVE BASE EXCESS, VEN: ABNORMAL MMOL/L (ref 0–2)
NITRITE, URINE: NEGATIVE
NOTIFICATION TIME: ABNORMAL
NOTIFICATION: ABNORMAL
NRBC AUTOMATED: 0 PER 100 WBC
O2 DEVICE/FLOW/%: ABNORMAL
O2 SAT, VEN: 93.1 % (ref 60–85)
OTHER OBSERVATIONS UA: ABNORMAL
OXYHEMOGLOBIN: ABNORMAL % (ref 95–98)
PATIENT TEMP: 37
PCO2, VEN, TEMP ADJ: ABNORMAL MMHG (ref 39–55)
PCO2, VEN: 48.6 (ref 39–55)
PDW BLD-RTO: 14 % (ref 11.8–14.4)
PEEP/CPAP: ABNORMAL
PH UA: 7 (ref 5–9)
PH VENOUS: 7.41 (ref 7.32–7.42)
PH, VEN, TEMP ADJ: ABNORMAL (ref 7.32–7.42)
PLATELET # BLD: 186 K/UL (ref 138–453)
PLATELET ESTIMATE: ABNORMAL
PMV BLD AUTO: 10 FL (ref 8.1–13.5)
PO2, VEN, TEMP ADJ: ABNORMAL MMHG (ref 30–50)
PO2, VEN: 66.3 (ref 30–50)
POSITIVE BASE EXCESS, VEN: 4.1 MMOL/L (ref 0–2)
POTASSIUM SERPL-SCNC: 4 MMOL/L (ref 3.7–5.3)
PRO-BNP: 190 PG/ML
PROTEIN UA: NEGATIVE
PSV: ABNORMAL
PT. POSITION: ABNORMAL
RBC # BLD: 3.78 M/UL (ref 3.95–5.11)
RBC # BLD: ABNORMAL 10*6/UL
RBC UA: ABNORMAL /HPF (ref 0–2)
RENAL EPITHELIAL, UA: ABNORMAL /HPF
RESPIRATORY RATE: ABNORMAL
SAMPLE SITE: ABNORMAL
SEG NEUTROPHILS: 81 % (ref 36–65)
SEGMENTED NEUTROPHILS ABSOLUTE COUNT: 8.41 K/UL (ref 1.5–8.1)
SET RATE: ABNORMAL
SODIUM BLD-SCNC: 136 MMOL/L (ref 135–144)
SPECIFIC GRAVITY UA: <1.005 (ref 1.01–1.02)
TEXT FOR RESPIRATORY: ABNORMAL
TOTAL HB: ABNORMAL G/DL (ref 12–16)
TOTAL RATE: ABNORMAL
TRICHOMONAS: ABNORMAL
TROPONIN INTERP: NORMAL
TROPONIN INTERP: NORMAL
TROPONIN T: <0.03 NG/ML
TROPONIN T: <0.03 NG/ML
TROPONIN, HIGH SENSITIVITY: NORMAL NG/L (ref 0–14)
TROPONIN, HIGH SENSITIVITY: NORMAL NG/L (ref 0–14)
TURBIDITY: CLEAR
URINE HGB: NEGATIVE
UROBILINOGEN, URINE: NORMAL
VT: ABNORMAL
WBC # BLD: 10.4 K/UL (ref 3.5–11.3)
WBC # BLD: ABNORMAL 10*3/UL
WBC UA: ABNORMAL /HPF (ref 0–5)
YEAST: ABNORMAL

## 2019-07-09 PROCEDURE — 85025 COMPLETE CBC W/AUTO DIFF WBC: CPT

## 2019-07-09 PROCEDURE — 36415 COLL VENOUS BLD VENIPUNCTURE: CPT

## 2019-07-09 PROCEDURE — 71046 X-RAY EXAM CHEST 2 VIEWS: CPT

## 2019-07-09 PROCEDURE — 93005 ELECTROCARDIOGRAM TRACING: CPT | Performed by: EMERGENCY MEDICINE

## 2019-07-09 PROCEDURE — 83735 ASSAY OF MAGNESIUM: CPT

## 2019-07-09 PROCEDURE — 86403 PARTICLE AGGLUT ANTBDY SCRN: CPT

## 2019-07-09 PROCEDURE — 83880 ASSAY OF NATRIURETIC PEPTIDE: CPT

## 2019-07-09 PROCEDURE — 6370000000 HC RX 637 (ALT 250 FOR IP): Performed by: EMERGENCY MEDICINE

## 2019-07-09 PROCEDURE — 80048 BASIC METABOLIC PNL TOTAL CA: CPT

## 2019-07-09 PROCEDURE — 87086 URINE CULTURE/COLONY COUNT: CPT

## 2019-07-09 PROCEDURE — 81001 URINALYSIS AUTO W/SCOPE: CPT

## 2019-07-09 PROCEDURE — 99284 EMERGENCY DEPT VISIT MOD MDM: CPT

## 2019-07-09 PROCEDURE — 82805 BLOOD GASES W/O2 SATURATION: CPT

## 2019-07-09 PROCEDURE — 84484 ASSAY OF TROPONIN QUANT: CPT

## 2019-07-09 RX ORDER — IPRATROPIUM BROMIDE AND ALBUTEROL SULFATE 2.5; .5 MG/3ML; MG/3ML
1 SOLUTION RESPIRATORY (INHALATION) ONCE
Status: COMPLETED | OUTPATIENT
Start: 2019-07-09 | End: 2019-07-09

## 2019-07-09 RX ORDER — PREDNISONE 10 MG/1
TABLET ORAL
Qty: 20 TABLET | Refills: 0 | Status: SHIPPED | OUTPATIENT
Start: 2019-07-09 | End: 2019-07-19

## 2019-07-09 RX ORDER — DOXYCYCLINE HYCLATE 100 MG/1
100 CAPSULE ORAL ONCE
Status: COMPLETED | OUTPATIENT
Start: 2019-07-09 | End: 2019-07-09

## 2019-07-09 RX ORDER — PREDNISONE 10 MG/1
60 TABLET ORAL ONCE
Status: COMPLETED | OUTPATIENT
Start: 2019-07-09 | End: 2019-07-09

## 2019-07-09 RX ORDER — DOXYCYCLINE 100 MG/1
100 TABLET ORAL 2 TIMES DAILY
Qty: 20 TABLET | Refills: 0 | Status: SHIPPED | OUTPATIENT
Start: 2019-07-09 | End: 2019-07-19

## 2019-07-09 RX ORDER — OXYCODONE HYDROCHLORIDE AND ACETAMINOPHEN 5; 325 MG/1; MG/1
1 TABLET ORAL EVERY 4 HOURS PRN
Status: ON HOLD | COMMUNITY
End: 2020-11-05 | Stop reason: SDUPTHER

## 2019-07-09 RX ADMIN — DOXYCYCLINE HYCLATE 100 MG: 100 CAPSULE ORAL at 17:49

## 2019-07-09 RX ADMIN — IPRATROPIUM BROMIDE AND ALBUTEROL SULFATE 1 AMPULE: .5; 3 SOLUTION RESPIRATORY (INHALATION) at 16:18

## 2019-07-09 RX ADMIN — PREDNISONE 60 MG: 10 TABLET ORAL at 16:15

## 2019-07-09 ASSESSMENT — ENCOUNTER SYMPTOMS
SHORTNESS OF BREATH: 1
SORE THROAT: 0
DIARRHEA: 0
COUGH: 1
VOMITING: 0
RHINORRHEA: 0
CONSTIPATION: 0
WHEEZING: 0
NAUSEA: 0
ABDOMINAL DISTENTION: 0

## 2019-07-09 ASSESSMENT — PAIN DESCRIPTION - LOCATION: LOCATION: GENERALIZED

## 2019-07-09 ASSESSMENT — PAIN DESCRIPTION - DESCRIPTORS: DESCRIPTORS: ACHING

## 2019-07-09 ASSESSMENT — PAIN SCALES - GENERAL: PAINLEVEL_OUTOF10: 7

## 2019-07-09 ASSESSMENT — PAIN DESCRIPTION - PAIN TYPE: TYPE: ACUTE PAIN

## 2019-07-09 NOTE — ED PROVIDER NOTES
[facility] ED  Emergency Department        Pt Name: Lizzie Bradley  MRN: [id number]  [place] 1945  Date of evaluation: 7/9/19    CHIEF COMPLAINT       Chief Complaint   Patient presents with    Generalized Body Aches     pt states onset x 3 days    Fatigue    Cough     worse at night       HISTORY OF PRESENT ILLNESS  (Location/Symptom, Timing/Onset, Context/Setting, Quality, Duration, ModifyingFactors, Severity.)      Lizzie Client is a 69 y.o. female who presents with cough, with yellow productive sputum, as well as fatigue. Patient does have a history of COPD, follows with pulmonologist in [place]. Tactile temp this morning. On chronic pain meds due to chronic pain. Has a Dilaudid pump in place. No chest pain. Has baseline shortness of breath which is otherwise unchanged. But reports she gets pneumonia very easily. No lower extremity swelling, no abdominal pain, no nausea or vomiting she does have a distant history of smoking. But not actively smoking    PAST MEDICAL / SURGICAL / SOCIAL / FAMILY HISTORY      has a past medical history of Depression, GERD (gastroesophageal reflux disease), and Osteoporosis. has a past surgical history that includes Hysterectomy; back surgery; Breast surgery; Carpal tunnel release; joint replacement; joint replacement; fracture surgery (Left, 12/20/2018); and Wrist fracture surgery (Left, 12/20/2018).        Social History     Socioeconomic History    Marital status:      Spouse name: Not on file    Number of children: Not on file    Years of education: Not on file    Highest education level: Not on file   Occupational History    Not on file   Social Needs    Financial resource strain: Not on file    Food insecurity:     Worry: Not on file     Inability: Not on file    Transportation needs:     Medical: Not on file     Non-medical: Not on file   Tobacco Use    Smoking status: Former Smoker     Last attempt to quit: 1976     Years since quittin.6    Smokeless tobacco: Never Used   Substance and Sexual Activity    Alcohol use: No    Drug use: No    Sexual activity: Not on file   Lifestyle    Physical activity:     Days per week: Not on file     Minutes per session: Not on file    Stress: Not on file   Relationships    Social connections:     Talks on phone: Not on file     Gets together: Not on file     Attends Buddhism service: Not on file     Active member of club or organization: Not on file     Attends meetings of clubs or organizations: Not on file     Relationship status: Not on file    Intimate partner violence:     Fear of current or ex partner: Not on file     Emotionally abused: Not on file     Physically abused: Not on file     Forced sexual activity: Not on file   Other Topics Concern    Not on file   Social History Narrative    Not on file       History reviewed. No pertinent family history. Allergies:  Patient has no known allergies. Home Medications:  Prior to Admission medications    Medication Sig Start Date End Date Taking? Authorizing Provider   oxyCODONE-acetaminophen (PERCOCET) 5-325 MG per tablet Take 1 tablet by mouth every 4 hours as needed for Pain.    Yes Historical Provider, MD   doxycycline monohydrate (ADOXA) 100 MG tablet Take 1 tablet by mouth 2 times daily for 10 days 19 Yes Ana Paula Troncoso DO   predniSONE (DELTASONE) 10 MG tablet Take 4 tablets by mouth once daily for 5 days 19 Yes Ana Paula Troncoso DO   NONFORMULARY    Yes Historical Provider, MD   albuterol sulfate  (90 Base) MCG/ACT inhaler Inhale 2 puffs into the lungs 4 times daily 10/8/18  Yes Ba Santos PA-C   aspirin 81 MG tablet Take 81 mg by mouth daily   Yes Historical Provider, MD   DULoxetine (CYMBALTA) 60 MG extended release capsule Take 60 mg by mouth daily   Yes Historical Provider, MD   traZODone (DESYREL) 100 MG tablet Take 200 mg by mouth nightly    Yes Nontoxic appearing, answering all questions appropriately no signs of distress   HENT:   Head: Normocephalic and atraumatic. Eyes: Conjunctivae are normal. Right eye exhibits no discharge. Left eye exhibits no discharge. Cardiovascular: Normal rate, regular rhythm and normal heart sounds. Exam reveals no gallop and no friction rub. No murmur heard. Pulmonary/Chest: Effort normal and breath sounds normal. No respiratory distress. She has no wheezes. She has no rales. She exhibits no tenderness. expiratory wheezes diffusely, no rales or rhonchi noted. Abdominal: Soft. She exhibits no distension and no mass. There is no tenderness. There is no rebound and no guarding. Neurological: She is alert and oriented to person, place, and time. Skin: Skin is warm and dry. No rash noted. Nursing note and vitals reviewed. DIFFERENTIAL  DIAGNOSIS     She is not in any respiratory distress. Her lungs are diffusely wheezy, but she is not hypoxic and her respiratory distress. Could be related to her COPD exacerbation. She does report she gets pneumonia very easily, will get chest x-ray, cardiac work-up. Patient does have a Dilaudid pain pump, but is asking for additional pain meds. But was told no.     PLAN (LABS / IMAGING / EKG):  Orders Placed This Encounter   Procedures    Cardiac monitor    Urine Culture    XR CHEST STANDARD (2 VW)    CBC Auto Differential    Basic Metabolic Panel    Brain Natriuretic Peptide    Magnesium    Troponin    Blood gas, venous    Troponin    Urinalysis    Microscopic Urinalysis    HHN Treatment    EKG 12 Lead       MEDICATIONS ORDERED:  Orders Placed This Encounter   Medications    ipratropium-albuterol (DUONEB) nebulizer solution 1 ampule    predniSONE (DELTASONE) tablet 60 mg    doxycycline hyclate (VIBRAMYCIN) capsule 100 mg    doxycycline monohydrate (ADOXA) 100 MG tablet     Sig: Take 1 tablet by mouth 2 times daily for 10 days     Dispense:  20 tablet /HPF    RBC, UA None 0 - 2 /HPF    Casts UA NOT REPORTED /LPF    Crystals UA NOT REPORTED None /HPF    Epithelial Cells UA 0 TO 2 0 - 25 /HPF    Renal Epithelial, Urine NOT REPORTED 0 /HPF    Bacteria, UA TRACE (A) None    Mucus, UA NOT REPORTED None    Trichomonas, UA NOT REPORTED None    Amorphous, UA NOT REPORTED None    Other Observations UA NOT REPORTED NOT REQ. Yeast, UA NOT REPORTED None   EKG 12 Lead   Result Value Ref Range    Ventricular Rate 74 BPM    Atrial Rate 74 BPM    P-R Interval 182 ms    QRS Duration 86 ms    Q-T Interval 402 ms    QTc Calculation (Bazett) 446 ms    P Axis 53 degrees    R Axis -15 degrees    T Axis 30 degrees       IMPRESSION: COPD exacerbation, bronchitis    RADIOLOGY:  XR CHEST STANDARD (2 VW)   Final Result   Ill-defined nodule-like focus of the right upper lobe at the apex. Follow-up   to ensure resolution and exclude neoplasm is needed. Recommend either a   follow-up chest x-ray in 2-3 weeks after treatment, or a chest CT examination. Mild bibasilar atelectasis versus pneumonia. EKG:  EKG shows normal sinus rhythm. ,  Left axis deviation, poor R wave progression is noted, no ST elevations or depressions. Normal intervals. EMERGENCY DEPARTMENT COURSE:  And did report some improvement after aerosol treatment, she was given a steroid. Her cardiac work-up has been unremarkable, including troponin x2 which are negative. Patient does appear tired, and falls asleep. She does have a pain pump in place, and has chronic baseline pain. But this is her normal.  Plan for discharge home as her chest x-ray did show concern for possible pneumonia. And nodule in the right upper lobe which she was told about. And she does have a pulmonologist.  Patient can return with worsening shortness of breath or fever. She was started on an antibiotic. FINAL IMPRESSION      1. Upper respiratory tract infection, unspecified type    2.  Chronic bronchitis, unspecified chronic bronchitis type [facility])          DISPOSITION / PLAN     DISPOSITION Decision To Discharge 07/09/2019 06:32:13 PM      PATIENT REFERRED TO:  Therese Matthews MD  [address] [place] [address]  [phone]    Schedule an appointment as soon as possible for a visit in 2 days        DISCHARGE MEDICATIONS:  Discharge Medication List as of 7/9/2019  6:03 PM      START taking these medications    Details   doxycycline monohydrate (ADOXA) 100 MG tablet Take 1 tablet by mouth 2 times daily for 10 days, Disp-20 tablet, R-0Print      predniSONE (DELTASONE) 10 MG tablet Take 4 tablets by mouth once daily for 5 days, Disp-20 tablet, R-0Print             Llana Simper  6:51 PM    Attending Emergency Physician  [facility] ED    (Please note that portions of this note were completed with a voice recognition program.  Effortswere made to edit the dictations but occasionally words are mis-transcribed.)              Josemanuel Morales DO  07/09/19 [id number]

## 2019-07-10 LAB
EKG ATRIAL RATE: 74 BPM
EKG P AXIS: 53 DEGREES
EKG P-R INTERVAL: 182 MS
EKG Q-T INTERVAL: 402 MS
EKG QRS DURATION: 86 MS
EKG QTC CALCULATION (BAZETT): 446 MS
EKG R AXIS: -15 DEGREES
EKG T AXIS: 30 DEGREES
EKG VENTRICULAR RATE: 74 BPM

## 2019-07-10 PROCEDURE — 93010 ELECTROCARDIOGRAM REPORT: CPT | Performed by: INTERNAL MEDICINE

## 2019-07-11 LAB
CULTURE: ABNORMAL
Lab: ABNORMAL
SPECIMEN DESCRIPTION: ABNORMAL

## 2019-08-28 ENCOUNTER — HOSPITAL ENCOUNTER (EMERGENCY)
Age: 74
Discharge: HOME | End: 2019-08-28
Payer: MEDICARE

## 2019-08-28 ENCOUNTER — APPOINTMENT (OUTPATIENT)
Dept: CT IMAGING | Age: 74
End: 2019-08-28
Payer: MEDICARE

## 2019-08-28 VITALS
BODY MASS INDEX: 25.69 KG/M2 | TEMPERATURE: 98.2 F | OXYGEN SATURATION: 95 % | HEART RATE: 85 BPM | SYSTOLIC BLOOD PRESSURE: 172 MMHG | DIASTOLIC BLOOD PRESSURE: 105 MMHG | WEIGHT: 145 LBS | RESPIRATION RATE: 18 BRPM

## 2019-08-28 DIAGNOSIS — K59.00 CONSTIPATION, UNSPECIFIED CONSTIPATION TYPE: ICD-10-CM

## 2019-08-28 DIAGNOSIS — K52.9 COLITIS: Primary | ICD-10-CM

## 2019-08-28 LAB
-: ABNORMAL
ALBUMIN SERPL-MCNC: 3.9 G/DL (ref 3.5–5.2)
ALBUMIN/GLOBULIN RATIO: 1.2 (ref 1–2.5)
ALP BLD-CCNC: 74 U/L (ref 35–104)
ALT SERPL-CCNC: 10 U/L (ref 5–33)
AMORPHOUS: ABNORMAL
ANION GAP SERPL CALCULATED.3IONS-SCNC: 9 MMOL/L (ref 9–17)
AST SERPL-CCNC: 15 U/L
BACTERIA: ABNORMAL
BILIRUB SERPL-MCNC: 0.16 MG/DL (ref 0.3–1.2)
BILIRUBIN URINE: NEGATIVE
BUN BLDV-MCNC: 12 MG/DL (ref 8–23)
BUN/CREAT BLD: 18 (ref 9–20)
CALCIUM SERPL-MCNC: 9.2 MG/DL (ref 8.6–10.4)
CASTS UA: ABNORMAL /LPF
CHLORIDE BLD-SCNC: 102 MMOL/L (ref 98–107)
CO2: 28 MMOL/L (ref 20–31)
COLOR: YELLOW
COMMENT UA: ABNORMAL
CREAT SERPL-MCNC: 0.66 MG/DL (ref 0.5–0.9)
CRYSTALS, UA: ABNORMAL /HPF
EPITHELIAL CELLS UA: ABNORMAL /HPF (ref 0–25)
GFR AFRICAN AMERICAN: >60 ML/MIN
GFR NON-AFRICAN AMERICAN: >60 ML/MIN
GFR SERPL CREATININE-BSD FRML MDRD: ABNORMAL ML/MIN/{1.73_M2}
GFR SERPL CREATININE-BSD FRML MDRD: ABNORMAL ML/MIN/{1.73_M2}
GLUCOSE BLD-MCNC: 99 MG/DL (ref 70–99)
GLUCOSE URINE: NEGATIVE
HCT VFR BLD CALC: 34.3 % (ref 36.3–47.1)
HEMOGLOBIN: 10.9 G/DL (ref 11.9–15.1)
KETONES, URINE: NEGATIVE
LACTIC ACID, WHOLE BLOOD: NORMAL MMOL/L (ref 0.7–2.1)
LACTIC ACID: 0.5 MMOL/L (ref 0.5–2.2)
LEUKOCYTE ESTERASE, URINE: NEGATIVE
LIPASE: 27 U/L (ref 13–60)
MCH RBC QN AUTO: 30 PG (ref 25.2–33.5)
MCHC RBC AUTO-ENTMCNC: 31.8 G/DL (ref 28.4–34.8)
MCV RBC AUTO: 94.5 FL (ref 82.6–102.9)
MUCUS: ABNORMAL
NITRITE, URINE: NEGATIVE
NRBC AUTOMATED: 0 PER 100 WBC
OTHER OBSERVATIONS UA: ABNORMAL
PDW BLD-RTO: 14 % (ref 11.8–14.4)
PH UA: 6 (ref 5–9)
PLATELET # BLD: 179 K/UL (ref 138–453)
PMV BLD AUTO: 10.1 FL (ref 8.1–13.5)
POTASSIUM SERPL-SCNC: 4.1 MMOL/L (ref 3.7–5.3)
PROTEIN UA: NEGATIVE
RBC # BLD: 3.63 M/UL (ref 3.95–5.11)
RBC UA: ABNORMAL /HPF (ref 0–2)
RENAL EPITHELIAL, UA: ABNORMAL /HPF
SODIUM BLD-SCNC: 139 MMOL/L (ref 135–144)
SPECIFIC GRAVITY UA: 1.02 (ref 1.01–1.02)
TOTAL PROTEIN: 7.2 G/DL (ref 6.4–8.3)
TRICHOMONAS: ABNORMAL
TURBIDITY: CLEAR
URINE HGB: NEGATIVE
UROBILINOGEN, URINE: NORMAL
WBC # BLD: 5 K/UL (ref 3.5–11.3)
WBC UA: ABNORMAL /HPF (ref 0–5)
YEAST: ABNORMAL

## 2019-08-28 PROCEDURE — 6370000000 HC RX 637 (ALT 250 FOR IP): Performed by: PHYSICIAN ASSISTANT

## 2019-08-28 PROCEDURE — 6360000004 HC RX CONTRAST MEDICATION: Performed by: PHYSICIAN ASSISTANT

## 2019-08-28 PROCEDURE — 85027 COMPLETE CBC AUTOMATED: CPT

## 2019-08-28 PROCEDURE — 99284 EMERGENCY DEPT VISIT MOD MDM: CPT

## 2019-08-28 PROCEDURE — 36415 COLL VENOUS BLD VENIPUNCTURE: CPT

## 2019-08-28 PROCEDURE — 74177 CT ABD & PELVIS W/CONTRAST: CPT

## 2019-08-28 PROCEDURE — 80053 COMPREHEN METABOLIC PANEL: CPT

## 2019-08-28 PROCEDURE — 6360000002 HC RX W HCPCS: Performed by: PHYSICIAN ASSISTANT

## 2019-08-28 PROCEDURE — 81001 URINALYSIS AUTO W/SCOPE: CPT

## 2019-08-28 PROCEDURE — 83605 ASSAY OF LACTIC ACID: CPT

## 2019-08-28 PROCEDURE — 83690 ASSAY OF LIPASE: CPT

## 2019-08-28 PROCEDURE — 96374 THER/PROPH/DIAG INJ IV PUSH: CPT

## 2019-08-28 RX ORDER — CIPROFLOXACIN 500 MG/1
500 TABLET, FILM COATED ORAL 2 TIMES DAILY
Qty: 14 TABLET | Refills: 0 | Status: SHIPPED | OUTPATIENT
Start: 2019-08-28 | End: 2019-09-04

## 2019-08-28 RX ORDER — METRONIDAZOLE 250 MG/1
500 TABLET ORAL 2 TIMES DAILY
Qty: 28 TABLET | Refills: 0 | Status: SHIPPED | OUTPATIENT
Start: 2019-08-28 | End: 2019-09-04

## 2019-08-28 RX ORDER — SODIUM PHOSPHATE, DIBASIC AND SODIUM PHOSPHATE, MONOBASIC 7; 19 G/133ML; G/133ML
1 ENEMA RECTAL ONCE
Status: COMPLETED | OUTPATIENT
Start: 2019-08-28 | End: 2019-08-28

## 2019-08-28 RX ORDER — FENTANYL CITRATE 50 UG/ML
25 INJECTION, SOLUTION INTRAMUSCULAR; INTRAVENOUS ONCE
Status: COMPLETED | OUTPATIENT
Start: 2019-08-28 | End: 2019-08-28

## 2019-08-28 RX ADMIN — IOPAMIDOL 75 ML: 755 INJECTION, SOLUTION INTRAVENOUS at 15:27

## 2019-08-28 RX ADMIN — FENTANYL CITRATE 25 MCG: 50 INJECTION INTRAMUSCULAR; INTRAVENOUS at 16:06

## 2019-08-28 RX ADMIN — SODIUM PHOSPHATE, DIBASIC AND SODIUM PHOSPHATE, MONOBASIC 1 ENEMA: 7; 19 ENEMA RECTAL at 18:04

## 2019-08-28 ASSESSMENT — ENCOUNTER SYMPTOMS
SHORTNESS OF BREATH: 0
CHEST TIGHTNESS: 0
CONSTIPATION: 0
WHEEZING: 0
BACK PAIN: 0
SORE THROAT: 0
NAUSEA: 0
ABDOMINAL PAIN: 1
DIARRHEA: 0
EYE REDNESS: 0
COUGH: 0
BLOOD IN STOOL: 0
VOMITING: 0
EYE DISCHARGE: 0
RHINORRHEA: 0

## 2019-08-28 ASSESSMENT — PAIN SCALES - GENERAL
PAINLEVEL_OUTOF10: 5
PAINLEVEL_OUTOF10: 8
PAINLEVEL_OUTOF10: 10

## 2019-08-28 ASSESSMENT — PAIN DESCRIPTION - PAIN TYPE: TYPE: ACUTE PAIN

## 2019-08-28 ASSESSMENT — PAIN DESCRIPTION - LOCATION: LOCATION: FLANK

## 2019-08-28 ASSESSMENT — PAIN DESCRIPTION - FREQUENCY: FREQUENCY: CONTINUOUS

## 2019-08-28 ASSESSMENT — PAIN DESCRIPTION - ORIENTATION: ORIENTATION: RIGHT

## 2019-08-28 ASSESSMENT — PAIN DESCRIPTION - DESCRIPTORS: DESCRIPTORS: SHARP

## 2019-08-28 NOTE — ED PROVIDER NOTES
[facility] ED  eMERGENCY dEPARTMENT eNCOUnter      Pt Name: Emperatriz Simpson  MRN: [id number]  [place] 1945  Date of evaluation: 8/28/2019  Provider: Renea Rivers Dr     Chief Complaint   Patient presents with    Flank Pain     right side- onset Monday and worsening- has pain pump in place         HISTORY OF PRESENT ILLNESS   (Location/Symptom, Timing/Onset, Context/Setting,Quality, Duration, Modifying Factors, Severity)  Note limiting factors. Emperatriz Simpson is a76 y.o. female who presents to the emergency department with complaints of right lower abdominal discomfort for the last 3 days. Associated complaints include nausea. Patient reports she does have a pain pump in place in her abdomen. She reports that she just feels like the pain is not getting any better so she decided come to the ER for further evaluation. She denies any diarrhea. Reports that she has had bowel movements but they are not regular. She denies any vomiting. She denies any chest pain or shortness of breath. Denies any pelvic symptoms denies any dysuria hematuria. Denies any vaginal discharge. She otherwise has no other complaints at this time. HPI    Nursing Notes werereviewed. REVIEW OF SYSTEMS    (2-9 systems for level 4, 10 or more for level 5)     Review of Systems   Constitutional: Negative for chills, diaphoresis and fever. HENT: Negative for congestion, ear pain, rhinorrhea and sore throat. Eyes: Negative for discharge, redness and visual disturbance. Respiratory: Negative for cough, chest tightness, shortness of breath and wheezing. Cardiovascular: Negative for chest pain and palpitations. Gastrointestinal: Positive for abdominal pain. Negative for blood in stool, constipation, diarrhea, nausea and vomiting. Endocrine: Negative for polydipsia, polyphagia and polyuria.    Genitourinary: Negative for decreased urine volume, difficulty urinating, dysuria,

## 2019-09-19 ENCOUNTER — TELEPHONE (OUTPATIENT)
Dept: SURGERY | Age: 74
End: 2019-09-19

## 2019-12-05 ENCOUNTER — HOSPITAL ENCOUNTER (OUTPATIENT)
Age: 74
Discharge: HOME | End: 2019-12-05
Payer: MEDICARE

## 2019-12-05 PROCEDURE — 36415 COLL VENOUS BLD VENIPUNCTURE: CPT

## 2019-12-05 PROCEDURE — 87327 CRYPTOCOCCUS NEOFORM AG IA: CPT

## 2019-12-06 LAB — CRYPTOCOCCAL ANTIGEN: NEGATIVE

## 2020-07-30 ENCOUNTER — HOSPITAL ENCOUNTER (OUTPATIENT)
Age: 75
Discharge: HOME | End: 2020-07-30
Payer: MEDICARE

## 2020-07-30 LAB
ABSOLUTE EOS #: 0.13 K/UL (ref 0–0.44)
ABSOLUTE IMMATURE GRANULOCYTE: <0.03 K/UL (ref 0–0.3)
ABSOLUTE LYMPH #: 2.89 K/UL (ref 1.1–3.7)
ABSOLUTE MONO #: 0.62 K/UL (ref 0.1–1.2)
BASOPHILS # BLD: 0 % (ref 0–2)
BASOPHILS ABSOLUTE: <0.03 K/UL (ref 0–0.2)
C-REACTIVE PROTEIN: 2.6 MG/L (ref 0–5)
DIFFERENTIAL TYPE: ABNORMAL
EOSINOPHILS RELATIVE PERCENT: 2 % (ref 1–4)
HCT VFR BLD CALC: 42.7 % (ref 36.3–47.1)
HEMOGLOBIN: 13.4 G/DL (ref 11.9–15.1)
IMMATURE GRANULOCYTES: 0 %
LYMPHOCYTES # BLD: 39 % (ref 24–43)
MCH RBC QN AUTO: 29.5 PG (ref 25.2–33.5)
MCHC RBC AUTO-ENTMCNC: 31.4 G/DL (ref 28.4–34.8)
MCV RBC AUTO: 93.8 FL (ref 82.6–102.9)
MONOCYTES # BLD: 8 % (ref 3–12)
NRBC AUTOMATED: 0 PER 100 WBC
PDW BLD-RTO: 15.5 % (ref 11.8–14.4)
PLATELET # BLD: 218 K/UL (ref 138–453)
PLATELET ESTIMATE: ABNORMAL
PMV BLD AUTO: 9.3 FL (ref 8.1–13.5)
RBC # BLD: 4.55 M/UL (ref 3.95–5.11)
RBC # BLD: ABNORMAL 10*6/UL
RHEUMATOID FACTOR: <10 IU/ML
SEDIMENTATION RATE, ERYTHROCYTE: 5 MM (ref 0–20)
SEG NEUTROPHILS: 51 % (ref 36–65)
SEGMENTED NEUTROPHILS ABSOLUTE COUNT: 3.78 K/UL (ref 1.5–8.1)
WBC # BLD: 7.5 K/UL (ref 3.5–11.3)
WBC # BLD: ABNORMAL 10*3/UL

## 2020-07-30 PROCEDURE — 85025 COMPLETE CBC W/AUTO DIFF WBC: CPT

## 2020-07-30 PROCEDURE — 86431 RHEUMATOID FACTOR QUANT: CPT

## 2020-07-30 PROCEDURE — 86038 ANTINUCLEAR ANTIBODIES: CPT

## 2020-07-30 PROCEDURE — 86140 C-REACTIVE PROTEIN: CPT

## 2020-07-30 PROCEDURE — 36415 COLL VENOUS BLD VENIPUNCTURE: CPT

## 2020-07-30 PROCEDURE — 85651 RBC SED RATE NONAUTOMATED: CPT

## 2020-07-30 PROCEDURE — 86812 HLA TYPING A B OR C: CPT

## 2020-07-31 LAB — ANTI-NUCLEAR ANTIBODY (ANA): NEGATIVE

## 2020-08-01 LAB — HLA B27: NEGATIVE

## 2020-08-06 ENCOUNTER — HOSPITAL ENCOUNTER (OUTPATIENT)
Dept: NUCLEAR MEDICINE | Age: 75
Discharge: HOME | End: 2020-08-08
Payer: MEDICARE

## 2020-08-06 PROCEDURE — A9503 TC99M MEDRONATE: HCPCS | Performed by: ORTHOPAEDIC SURGERY

## 2020-08-06 PROCEDURE — 3430000000 HC RX DIAGNOSTIC RADIOPHARMACEUTICAL: Performed by: ORTHOPAEDIC SURGERY

## 2020-08-06 PROCEDURE — 78315 BONE IMAGING 3 PHASE: CPT

## 2020-08-06 RX ORDER — TC 99M MEDRONATE 20 MG/10ML
25 INJECTION, POWDER, LYOPHILIZED, FOR SOLUTION INTRAVENOUS
Status: COMPLETED | OUTPATIENT
Start: 2020-08-06 | End: 2020-08-06

## 2020-08-06 RX ADMIN — TC 99M MEDRONATE 25 MILLICURIE: 20 INJECTION, POWDER, LYOPHILIZED, FOR SOLUTION INTRAVENOUS at 10:20

## 2020-08-16 ENCOUNTER — APPOINTMENT (OUTPATIENT)
Dept: GENERAL RADIOLOGY | Age: 75
DRG: 871 | End: 2020-08-16
Payer: MEDICARE

## 2020-08-16 ENCOUNTER — HOSPITAL ENCOUNTER (INPATIENT)
Age: 75
LOS: 5 days | Discharge: HOME | DRG: 871 | End: 2020-08-21
Attending: FAMILY MEDICINE | Admitting: INTERNAL MEDICINE
Payer: MEDICARE

## 2020-08-16 PROBLEM — E87.20 LACTIC ACIDOSIS: Status: ACTIVE | Noted: 2020-08-16

## 2020-08-16 PROBLEM — A41.9 SEPSIS DUE TO PNEUMONIA (HCC): Status: ACTIVE | Noted: 2020-08-16

## 2020-08-16 PROBLEM — A41.9 SEPTIC SHOCK (HCC): Status: ACTIVE | Noted: 2020-08-16

## 2020-08-16 PROBLEM — J18.9 SEPSIS DUE TO PNEUMONIA (HCC): Status: ACTIVE | Noted: 2020-08-16

## 2020-08-16 PROBLEM — R65.21 SEPTIC SHOCK (HCC): Status: ACTIVE | Noted: 2020-08-16

## 2020-08-16 LAB
ABSOLUTE EOS #: 0.07 K/UL (ref 0–0.44)
ABSOLUTE IMMATURE GRANULOCYTE: <0.03 K/UL (ref 0–0.3)
ABSOLUTE LYMPH #: 0.7 K/UL (ref 1.1–3.7)
ABSOLUTE MONO #: 0.29 K/UL (ref 0.1–1.2)
ALBUMIN SERPL-MCNC: 4 G/DL (ref 3.5–5.2)
ALBUMIN/GLOBULIN RATIO: 1.6 (ref 1–2.5)
ALLEN TEST: ABNORMAL
ALP BLD-CCNC: 54 U/L (ref 35–104)
ALT SERPL-CCNC: 22 U/L (ref 5–33)
ANION GAP SERPL CALCULATED.3IONS-SCNC: 8 MMOL/L (ref 9–17)
AST SERPL-CCNC: 26 U/L
BASOPHILS # BLD: 0 % (ref 0–2)
BASOPHILS ABSOLUTE: <0.03 K/UL (ref 0–0.2)
BILIRUB SERPL-MCNC: 0.34 MG/DL (ref 0.3–1.2)
BILIRUBIN URINE: NEGATIVE
BUN BLDV-MCNC: 15 MG/DL (ref 8–23)
BUN/CREAT BLD: 20 (ref 9–20)
CALCIUM SERPL-MCNC: 9.3 MG/DL (ref 8.6–10.4)
CARBOXYHEMOGLOBIN: ABNORMAL % (ref 0–5)
CHLORIDE BLD-SCNC: 99 MMOL/L (ref 98–107)
CO2: 29 MMOL/L (ref 20–31)
COLOR: YELLOW
COMMENT UA: NORMAL
CREAT SERPL-MCNC: 0.76 MG/DL (ref 0.5–0.9)
DIFFERENTIAL TYPE: ABNORMAL
EKG ATRIAL RATE: 83 BPM
EKG P AXIS: 60 DEGREES
EKG P-R INTERVAL: 172 MS
EKG Q-T INTERVAL: 390 MS
EKG QRS DURATION: 84 MS
EKG QTC CALCULATION (BAZETT): 458 MS
EKG R AXIS: -18 DEGREES
EKG T AXIS: 45 DEGREES
EKG VENTRICULAR RATE: 83 BPM
EOSINOPHILS RELATIVE PERCENT: 1 % (ref 1–4)
FIO2: ABNORMAL
GFR AFRICAN AMERICAN: >60 ML/MIN
GFR NON-AFRICAN AMERICAN: >60 ML/MIN
GFR SERPL CREATININE-BSD FRML MDRD: ABNORMAL ML/MIN/{1.73_M2}
GFR SERPL CREATININE-BSD FRML MDRD: ABNORMAL ML/MIN/{1.73_M2}
GLUCOSE BLD-MCNC: 92 MG/DL (ref 70–99)
GLUCOSE URINE: NEGATIVE
HCO3 ARTERIAL: 28.8 MMOL/L (ref 22–26)
HCT VFR BLD CALC: 44.6 % (ref 36.3–47.1)
HEMOGLOBIN: 14.3 G/DL (ref 11.9–15.1)
IMMATURE GRANULOCYTES: 0 %
KETONES, URINE: NEGATIVE
LACTIC ACID, SEPSIS WHOLE BLOOD: ABNORMAL MMOL/L (ref 0.5–1.9)
LACTIC ACID, SEPSIS WHOLE BLOOD: NORMAL MMOL/L (ref 0.5–1.9)
LACTIC ACID, SEPSIS: 1.8 MMOL/L (ref 0.5–1.9)
LACTIC ACID, SEPSIS: 2.1 MMOL/L (ref 0.5–1.9)
LACTIC ACID, SEPSIS: 2.1 MMOL/L (ref 0.5–1.9)
LACTIC ACID, SEPSIS: 2.4 MMOL/L (ref 0.5–1.9)
LEUKOCYTE ESTERASE, URINE: NEGATIVE
LYMPHOCYTES # BLD: 11 % (ref 24–43)
MAGNESIUM: 1.8 MG/DL (ref 1.6–2.6)
MCH RBC QN AUTO: 29.3 PG (ref 25.2–33.5)
MCHC RBC AUTO-ENTMCNC: 32.1 G/DL (ref 28.4–34.8)
MCV RBC AUTO: 91.4 FL (ref 82.6–102.9)
METHEMOGLOBIN: ABNORMAL % (ref 0–1.9)
MODE: ABNORMAL
MONOCYTES # BLD: 5 % (ref 3–12)
NEGATIVE BASE EXCESS, ART: ABNORMAL MMOL/L (ref 0–2)
NITRITE, URINE: NEGATIVE
NOTIFICATION TIME: ABNORMAL
NOTIFICATION: ABNORMAL
NRBC AUTOMATED: 0 PER 100 WBC
O2 DEVICE/FLOW/%: ABNORMAL
O2 SAT, ARTERIAL: 92.6 % (ref 95–98)
OXYHEMOGLOBIN: ABNORMAL % (ref 95–98)
PATIENT TEMP: 37
PCO2 ARTERIAL: 43.1 MMHG (ref 35–45)
PCO2, ART, TEMP ADJ: ABNORMAL (ref 35–45)
PDW BLD-RTO: 15.2 % (ref 11.8–14.4)
PEEP/CPAP: ABNORMAL
PH ARTERIAL: 7.44 (ref 7.35–7.45)
PH UA: 6.5 (ref 5–9)
PH, ART, TEMP ADJ: ABNORMAL (ref 7.35–7.45)
PLATELET # BLD: 206 K/UL (ref 138–453)
PLATELET ESTIMATE: ABNORMAL
PMV BLD AUTO: 9.6 FL (ref 8.1–13.5)
PO2 ARTERIAL: 62.3 MMHG (ref 80–100)
PO2, ART, TEMP ADJ: ABNORMAL MMHG (ref 80–100)
POSITIVE BASE EXCESS, ART: 4.2 MMOL/L (ref 0–2)
POTASSIUM SERPL-SCNC: 3.2 MMOL/L (ref 3.7–5.3)
PROTEIN UA: NEGATIVE
PSV: ABNORMAL
PT. POSITION: ABNORMAL
RBC # BLD: 4.88 M/UL (ref 3.95–5.11)
RBC # BLD: ABNORMAL 10*6/UL
RESPIRATORY RATE: 33
SAMPLE SITE: ABNORMAL
SARS-COV-2, PCR: NORMAL
SARS-COV-2, RAPID: NOT DETECTED
SARS-COV-2: NORMAL
SEG NEUTROPHILS: 83 % (ref 36–65)
SEGMENTED NEUTROPHILS ABSOLUTE COUNT: 5.29 K/UL (ref 1.5–8.1)
SET RATE: ABNORMAL
SODIUM BLD-SCNC: 136 MMOL/L (ref 135–144)
SOURCE: NORMAL
SPECIFIC GRAVITY UA: 1.01 (ref 1.01–1.02)
TEXT FOR RESPIRATORY: ABNORMAL
TOTAL HB: ABNORMAL G/DL (ref 12–16)
TOTAL PROTEIN: 6.5 G/DL (ref 6.4–8.3)
TOTAL RATE: ABNORMAL
TROPONIN INTERP: ABNORMAL
TROPONIN T: ABNORMAL NG/ML
TROPONIN, HIGH SENSITIVITY: 30 NG/L (ref 0–14)
TURBIDITY: CLEAR
URINE HGB: NEGATIVE
UROBILINOGEN, URINE: NORMAL
VT: ABNORMAL
WBC # BLD: 6.4 K/UL (ref 3.5–11.3)
WBC # BLD: ABNORMAL 10*3/UL

## 2020-08-16 PROCEDURE — 80053 COMPREHEN METABOLIC PANEL: CPT

## 2020-08-16 PROCEDURE — 94761 N-INVAS EAR/PLS OXIMETRY MLT: CPT

## 2020-08-16 PROCEDURE — 84484 ASSAY OF TROPONIN QUANT: CPT

## 2020-08-16 PROCEDURE — 85025 COMPLETE CBC W/AUTO DIFF WBC: CPT

## 2020-08-16 PROCEDURE — 71045 X-RAY EXAM CHEST 1 VIEW: CPT

## 2020-08-16 PROCEDURE — 36415 COLL VENOUS BLD VENIPUNCTURE: CPT

## 2020-08-16 PROCEDURE — U0002 COVID-19 LAB TEST NON-CDC: HCPCS

## 2020-08-16 PROCEDURE — 94664 DEMO&/EVAL PT USE INHALER: CPT

## 2020-08-16 PROCEDURE — 93010 ELECTROCARDIOGRAM REPORT: CPT | Performed by: INTERNAL MEDICINE

## 2020-08-16 PROCEDURE — 81003 URINALYSIS AUTO W/O SCOPE: CPT

## 2020-08-16 PROCEDURE — 6370000000 HC RX 637 (ALT 250 FOR IP): Performed by: INTERNAL MEDICINE

## 2020-08-16 PROCEDURE — 6360000002 HC RX W HCPCS: Performed by: INTERNAL MEDICINE

## 2020-08-16 PROCEDURE — 2580000003 HC RX 258: Performed by: INTERNAL MEDICINE

## 2020-08-16 PROCEDURE — 6360000002 HC RX W HCPCS: Performed by: FAMILY MEDICINE

## 2020-08-16 PROCEDURE — 83605 ASSAY OF LACTIC ACID: CPT

## 2020-08-16 PROCEDURE — 96366 THER/PROPH/DIAG IV INF ADDON: CPT

## 2020-08-16 PROCEDURE — 96365 THER/PROPH/DIAG IV INF INIT: CPT

## 2020-08-16 PROCEDURE — 82805 BLOOD GASES W/O2 SATURATION: CPT

## 2020-08-16 PROCEDURE — 96367 TX/PROPH/DG ADDL SEQ IV INF: CPT

## 2020-08-16 PROCEDURE — 99285 EMERGENCY DEPT VISIT HI MDM: CPT

## 2020-08-16 PROCEDURE — 93005 ELECTROCARDIOGRAM TRACING: CPT | Performed by: INTERNAL MEDICINE

## 2020-08-16 PROCEDURE — 83735 ASSAY OF MAGNESIUM: CPT

## 2020-08-16 PROCEDURE — 2580000003 HC RX 258: Performed by: FAMILY MEDICINE

## 2020-08-16 PROCEDURE — 94640 AIRWAY INHALATION TREATMENT: CPT

## 2020-08-16 PROCEDURE — 87040 BLOOD CULTURE FOR BACTERIA: CPT

## 2020-08-16 PROCEDURE — 2700000000 HC OXYGEN THERAPY PER DAY

## 2020-08-16 PROCEDURE — 2000000000 HC ICU R&B

## 2020-08-16 RX ORDER — SODIUM CHLORIDE 0.9 % (FLUSH) 0.9 %
10 SYRINGE (ML) INJECTION PRN
Status: DISCONTINUED | OUTPATIENT
Start: 2020-08-16 | End: 2020-08-21 | Stop reason: HOSPADM

## 2020-08-16 RX ORDER — TRAZODONE HYDROCHLORIDE 50 MG/1
200 TABLET ORAL NIGHTLY
Status: DISCONTINUED | OUTPATIENT
Start: 2020-08-16 | End: 2020-08-21 | Stop reason: HOSPADM

## 2020-08-16 RX ORDER — 0.9 % SODIUM CHLORIDE 0.9 %
1000 INTRAVENOUS SOLUTION INTRAVENOUS ONCE
Status: COMPLETED | OUTPATIENT
Start: 2020-08-16 | End: 2020-08-16

## 2020-08-16 RX ORDER — ASPIRIN 81 MG/1
81 TABLET, CHEWABLE ORAL DAILY
Status: DISCONTINUED | OUTPATIENT
Start: 2020-08-16 | End: 2020-08-21 | Stop reason: HOSPADM

## 2020-08-16 RX ORDER — ALENDRONATE SODIUM 70 MG/1
70 TABLET ORAL
Status: DISCONTINUED | OUTPATIENT
Start: 2020-08-16 | End: 2020-08-16

## 2020-08-16 RX ORDER — HYDROCODONE BITARTRATE AND ACETAMINOPHEN 10; 325 MG/1; MG/1
1 TABLET ORAL EVERY 8 HOURS PRN
Status: DISCONTINUED | OUTPATIENT
Start: 2020-08-16 | End: 2020-08-18

## 2020-08-16 RX ORDER — DULOXETIN HYDROCHLORIDE 60 MG/1
60 CAPSULE, DELAYED RELEASE ORAL DAILY
Status: DISCONTINUED | OUTPATIENT
Start: 2020-08-16 | End: 2020-08-21 | Stop reason: HOSPADM

## 2020-08-16 RX ORDER — IPRATROPIUM BROMIDE AND ALBUTEROL SULFATE 2.5; .5 MG/3ML; MG/3ML
1 SOLUTION RESPIRATORY (INHALATION) EVERY 4 HOURS PRN
Status: DISCONTINUED | OUTPATIENT
Start: 2020-08-16 | End: 2020-08-16

## 2020-08-16 RX ORDER — IPRATROPIUM BROMIDE AND ALBUTEROL SULFATE 2.5; .5 MG/3ML; MG/3ML
1 SOLUTION RESPIRATORY (INHALATION) 4 TIMES DAILY
Status: DISCONTINUED | OUTPATIENT
Start: 2020-08-16 | End: 2020-08-21 | Stop reason: HOSPADM

## 2020-08-16 RX ORDER — SODIUM CHLORIDE 0.9 % (FLUSH) 0.9 %
10 SYRINGE (ML) INJECTION EVERY 12 HOURS SCHEDULED
Status: DISCONTINUED | OUTPATIENT
Start: 2020-08-16 | End: 2020-08-21 | Stop reason: HOSPADM

## 2020-08-16 RX ORDER — PREGABALIN 75 MG/1
300 CAPSULE ORAL 2 TIMES DAILY
Status: DISCONTINUED | OUTPATIENT
Start: 2020-08-16 | End: 2020-08-21 | Stop reason: HOSPADM

## 2020-08-16 RX ORDER — ACETAMINOPHEN 650 MG/1
650 SUPPOSITORY RECTAL EVERY 6 HOURS PRN
Status: DISCONTINUED | OUTPATIENT
Start: 2020-08-16 | End: 2020-08-21 | Stop reason: HOSPADM

## 2020-08-16 RX ORDER — ALBUTEROL SULFATE 2.5 MG/3ML
2.5 SOLUTION RESPIRATORY (INHALATION) EVERY 4 HOURS PRN
Status: DISCONTINUED | OUTPATIENT
Start: 2020-08-16 | End: 2020-08-21 | Stop reason: HOSPADM

## 2020-08-16 RX ORDER — LEVOTHYROXINE SODIUM 0.15 MG/1
150 TABLET ORAL DAILY
Status: DISCONTINUED | OUTPATIENT
Start: 2020-08-16 | End: 2020-08-21 | Stop reason: HOSPADM

## 2020-08-16 RX ORDER — PANTOPRAZOLE SODIUM 40 MG/1
40 TABLET, DELAYED RELEASE ORAL
Status: DISCONTINUED | OUTPATIENT
Start: 2020-08-17 | End: 2020-08-21 | Stop reason: HOSPADM

## 2020-08-16 RX ORDER — ACETAMINOPHEN 325 MG/1
650 TABLET ORAL EVERY 6 HOURS PRN
Status: DISCONTINUED | OUTPATIENT
Start: 2020-08-16 | End: 2020-08-21 | Stop reason: HOSPADM

## 2020-08-16 RX ORDER — SODIUM CHLORIDE 9 MG/ML
INJECTION, SOLUTION INTRAVENOUS CONTINUOUS
Status: DISCONTINUED | OUTPATIENT
Start: 2020-08-16 | End: 2020-08-19

## 2020-08-16 RX ADMIN — ACETAMINOPHEN 650 MG: 325 TABLET, FILM COATED ORAL at 18:57

## 2020-08-16 RX ADMIN — TRAZODONE HYDROCHLORIDE 200 MG: 50 TABLET ORAL at 21:12

## 2020-08-16 RX ADMIN — VANCOMYCIN HYDROCHLORIDE 1750 MG: 750 INJECTION, POWDER, LYOPHILIZED, FOR SOLUTION INTRAVENOUS at 10:28

## 2020-08-16 RX ADMIN — WATER 1 G: 1 INJECTION INTRAMUSCULAR; INTRAVENOUS; SUBCUTANEOUS at 23:24

## 2020-08-16 RX ADMIN — SODIUM CHLORIDE: 9 INJECTION, SOLUTION INTRAVENOUS at 23:24

## 2020-08-16 RX ADMIN — SODIUM CHLORIDE 1000 ML: 9 INJECTION, SOLUTION INTRAVENOUS at 12:49

## 2020-08-16 RX ADMIN — HYDROCODONE BITARTRATE AND ACETAMINOPHEN 1 TABLET: 10; 325 TABLET ORAL at 18:57

## 2020-08-16 RX ADMIN — IPRATROPIUM BROMIDE AND ALBUTEROL SULFATE 1 AMPULE: .5; 3 SOLUTION RESPIRATORY (INHALATION) at 15:20

## 2020-08-16 RX ADMIN — SODIUM CHLORIDE: 9 INJECTION, SOLUTION INTRAVENOUS at 13:48

## 2020-08-16 RX ADMIN — PIPERACILLIN AND TAZOBACTAM 4.5 G: 4; .5 INJECTION, POWDER, LYOPHILIZED, FOR SOLUTION INTRAVENOUS at 09:29

## 2020-08-16 RX ADMIN — IPRATROPIUM BROMIDE AND ALBUTEROL SULFATE 1 AMPULE: .5; 3 SOLUTION RESPIRATORY (INHALATION) at 21:34

## 2020-08-16 RX ADMIN — SODIUM CHLORIDE 1000 ML: 9 INJECTION, SOLUTION INTRAVENOUS at 09:04

## 2020-08-16 RX ADMIN — SODIUM CHLORIDE, PRESERVATIVE FREE 10 ML: 5 INJECTION INTRAVENOUS at 21:13

## 2020-08-16 RX ADMIN — PREGABALIN 300 MG: 75 CAPSULE ORAL at 21:09

## 2020-08-16 RX ADMIN — ENOXAPARIN SODIUM 40 MG: 40 INJECTION, SOLUTION INTRAVENOUS; SUBCUTANEOUS at 16:02

## 2020-08-16 RX ADMIN — AZITHROMYCIN MONOHYDRATE 500 MG: 500 INJECTION, POWDER, LYOPHILIZED, FOR SOLUTION INTRAVENOUS at 23:29

## 2020-08-16 ASSESSMENT — PAIN DESCRIPTION - LOCATION
LOCATION: NECK
LOCATION: BACK

## 2020-08-16 ASSESSMENT — PAIN SCALES - GENERAL
PAINLEVEL_OUTOF10: 5
PAINLEVEL_OUTOF10: 8

## 2020-08-16 ASSESSMENT — PAIN DESCRIPTION - PAIN TYPE
TYPE: CHRONIC PAIN
TYPE: CHRONIC PAIN

## 2020-08-16 ASSESSMENT — ENCOUNTER SYMPTOMS
COUGH: 1
GASTROINTESTINAL NEGATIVE: 1

## 2020-08-16 ASSESSMENT — PAIN DESCRIPTION - FREQUENCY
FREQUENCY: CONTINUOUS
FREQUENCY: CONTINUOUS

## 2020-08-16 ASSESSMENT — PAIN DESCRIPTION - DESCRIPTORS
DESCRIPTORS: ACHING
DESCRIPTORS: ACHING

## 2020-08-16 NOTE — PROGRESS NOTES
RESPIRATORY ASSESSMENT PROTOCOL                                                                                              Patient Name: Tc Sharp Room#: P648/W359-24 : 1945     Admitting diagnosis: Sepsis due to pneumonia ([place] [address].) [J18.9, A41.9]       Medical History:   Past Medical History:   Diagnosis Date    COPD (chronic obstructive pulmonary disease) ([place] [address]     Depression     GERD (gastroesophageal reflux disease)     Osteoporosis        PATIENT ASSESSMENT    LABORATORY DATA  Hematology:   Lab Results   Component Value Date    WBC 6.4 2020    RBC 4.88 2020    HGB 14.3 2020    HCT 44.6 2020     2020     Chemistry:    Lab Results   Component Value Date    PHART 7.443 2020    HHR4WCM 43.1 2020    PO2ART 62.3 2020    Y4XDOVDO 92.6 2020    YGF8XNH 28.8 2020    PBEA 4.2 2020       Blood Culture:   Sputum Culture:     VITALS  Pulse: 90   Resp: 18  BP: (!) 101/50  SpO2: 96 % O2 Device: Nasal cannula  Temp: 98.9 °F (37.2 °C)  Comment:   SKIN COLOR  [] Normal  [] Pale  [] Dusky  [] Cyanotic      RESPIRATORY PATTERN  [] Normal  [] Dyspnea  [] Cheyne-Granger  [] Kussmaul  [] Biots  AMBULATORY  [] Yes  [] No  [] With Assistance    PEAK FLOW  Predicted:     Personal Best:        VITAL CAPACITY  Predicted value:  ml  Actual Value:  ml  30% of Predicted:  ml  Patient Acuity 0 1 2 3 4 Score   Level of Concious (LOC) []  Alert & Oriented or Pt normal LOC []  Confused;follows directions []  Confused & uncooper-ative [x]  Obtunded []  Comatose 3   Respiratory Rate  (RR) []  Reg. rate & pattern. 12 - 20 bpm  [x]  Increased RR.  Greater than 20 bpm   []  SOB w/ exertion or RR greater than 24 bpm []  Access- ory muscle use at rest. Abn.  resp. []  SOB at rest.   1   Bilateral Breath Sounds (BBS) []  Clear []  Diminish-ed bases  []  Diminish-ed t/o, or rales   [x]  Sporadic, scattered wheezes or rhonchi []  Persistentwheezes and, or absent BBS 3 Cough [x]  Strong, effective, & non-prod. []  Effective & prod. Less than 25 ml (2 TBSP) over past 24 hrs []  Ineffective & non-prod to less than 25 ML over past 24 hrs []  Ineffective and, or greater than 25 ml sputum prod. past 24 hrs. []  Nonspon- taneous; Requires suctioning 0   Pulmonary History  (PULM HX) []  No smoking and no chronic pulmonaryhistory []  Former smoker. Quit over 12 mos. ago []  Current smoker or quit w/ in 12 mos []  Pulm. History and, or 20 pk/yr smoking hx [x]  Admitted w/ acute pulm. dx and, or has been admitted w/ pulm. dx 2 or more times over past 12 mos 4   Surgical History this Admit  (SURG HX) [x]  No surgery []  General surgery []  Lower abdominal []  Thoracic or upper abdominal   []  Thoracic w/ pulm. disease 0   Chest X-Ray (CXR)/CT Scan []  Clear or not applicable []  Not available []  Atelect- asis or pleural effusions [x]  Localized infiltrate or pulm. edema []  Con-solidated Infiltrates, bilateral, or in more than 1 lobe 3   Slow or Forced VC, FEV1 OR PEFR (PULM FXN)  [x]  80% or greater, or not indicated []  Pt. unable to perform []  FEV1 or PEFR or VC 51-79%. []  FEV1 or PEFR or VC  30-49%   []  FEV1 or PEFR or VC less than 30%   0   TOTAL ACUITY: 14       CARE PLAN    If Acuity Level is 2, 3, or 4 in any of the following:    [x] BILATERAL BREATH SOUNDS (BBS)     [x] PULMONARY HISTORY (PULM HX)  [] PULMONARY FUNCTION (PULM FX)    Goal: Improve respiratory functions in patients with airway disease and decrease WOB    [x] AEROSOL PROTOCOL    Total Acuity:   16-32  []  Secondary Assessment in 24 hrs Total Acuity:  9-15  [x]  Secondary Assessment in 24 hrs Total Acuity:  4-8  []  Secondary Assessment in 48 hrs Total Acuity:  0-3  []  Secondary Assessment in 72 hrs   HHN AEROSOL THERAPY with  [physician-ordered bronchodilator(s)] q 4 & Albuterol PRN q2 hrs. Breath-Actuated Neb if BBS Acuity = 4, and pt. can use MP. Notify physician if condition deteriorates.   HHN AEROSOL THERAPY with  [physician-ordered bronchodilator(s)]  QID and Albuterol PRN q4 hrs. Breath-Actuated Neb if BBS Acuity = 4, and pt. can use MP. Notify physician if condition deteriorates. MDI THERAPY with  2 actuations of [physician-ordered bronchodilator(s)] via spacer TID Albuterol and PRNq4 hrs. If unable to utilize MDI: HHN [physician-ordered bronchodilator(s)] TID and Albuterol PRN q4 hrs. Notify physician if condition deteriorates. MDI THERAPY with  [physician-ordered bronchodilator(s)] via spacer TID PRN. If unable to utilize MDI: HHN [physician-ordered bronchodilator(s)] TID PRN. Notify physician if condition deteriorates. If Acuity Level is 2, 3, or 4 in any of the following:    [] COUGH     [] SURGICAL HISTORY (SURG HX)  [x] CHEST XRAY (CXR)    Goal: Improvement in sputum mobilization in patients with ineffective airway clearance. Reverse atelectasis. [x] Bronchopulmonary Hygiene Protocol    Total Acuity:   16-32  []  Secondary Assessment in 24 hrs Total Acuity:  9-15  [x]  Secondary Assessment in 24 hrs Total Acuity:  4-8  []  Secondary Assessment in 48 hrs Total Acuity:  0-3  []  Secondary Assessment in 72 hrs   METANEB QID with [physician-ordered bronchodilator(s)] if CXR Acuity = 4; otherwise:  PD&P, PEP, or Vest QID & PRN  NT Sxn PRN for ineffective cough  METANEB QID with [physician-ordered bronchodilator(s)] if CXR Acuity = 4; otherwise:  PD&P, PEP, or Vest TID & PRN  NT Sxn PRN for ineffective cough  Instruct patient to self-perform IS q1hr WA   Directed Cough self-performed q1hr WA     If Acuity Level is 2 or above in the following:    [] PULMONARY HISTORY (PULM HX)    Goal: Assist patient in quitting smoking to slow or stop the progression of lung disease.     [] Smoking Cessation Protocol    SMOKING CESSATION EDUCATION provided according to policy RQ_208: (marlyn with an X)  ____Yes    ____ No     ____ NA    Smoking Cessation Booklet given:  ____Yes  ____No ____Patient Lobo Vazquez

## 2020-08-16 NOTE — ED NOTES
Bed: 08  Expected date:   Expected time:   Means of arrival:   Comments:  Richard Waters  08/16/20 [id number]

## 2020-08-16 NOTE — PROGRESS NOTES
Oral bisphosphonates are used for the prevention and treatment of osteoporosis and for the treatment of Pagets disease. Oral bisphosphonate absorption is greatly affected by other medications, foods, and beverages. Therefore, the medication must be administered first thing in the morning at least 30 minutes before (60 minutes for ibandronate) the first food, beverage, or other medication. Additionally, patients must stay in an upright position (not to lie down) for at least 30 minutes after taking the oral bisphosphonate (60 minutes for ibandronate) due to risk of irritation to upper gastrointestinal mucosa. Esophagitis, dysphagia, esophageal ulcers, esophageal erosions, and esophageal stricture have been reported with oral bisphosphonates. This risk increases in patients unable to comply with dosing instructions. Due to the constraining dosing procedures and risk of serious GI events, oral bisphosphonates should be held during a patients hospitalization. In this instance the risk of serious adverse events outweighs the benefits of these medications. This medication will be discontinued at this time and may be reordered upon discharge.   Shonna Almonte, PharmD 8/16/2020 1:44 PM

## 2020-08-16 NOTE — ED NOTES
Left message for Dr. Rosa Cochran to return call to Dr. Yeny Up as hospitalist.     Irina Waters  08/16/20 [phone]

## 2020-08-16 NOTE — PROGRESS NOTES
Patient more alert.  at bedside. Patient does not want to eat at this time and  does not think she is alert enough to eat.  ordered breakfast for tomorrow morning.

## 2020-08-16 NOTE — ED PROVIDER NOTES
[facility] ED  EMERGENCY DEPARTMENT ENCOUNTER      Pt Name: Annmarie Castro  MRN: [id number]  [place] 1945  Date of evaluation: 8/16/2020  Provider: Paige Sam MD    CHIEF COMPLAINT     Chief Complaint   Patient presents with    Altered Mental Status     Last known well at midnight unable to follow commands, weak temp         HISTORY OF PRESENT ILLNESS   (Location/Symptom, Timing/Onset, Context/Setting,Quality, Duration, Modifying Factors, Severity)  Note limiting factors. Annmarie Castro is a77 y.o. female who presents to the emergency department      Is a 72-year-old female with a history of chronic back pain, COPD and osteoporosis who presents to the ER due to fever and altered mental status. Her  states that they are in [place]per [place] in about 3:00 this morning she woke up complaining of feeling cold. She seemed somewhat confused and drowsy. He states that she asked for coffee and she got continued states that she was cold and she had some rigors. She continued to get more obtunded and fairly unresponsive until he had to call the EMS. He states that she behaves this way whenever she gets pneumonia. He states that happened couple times in the past where her pneumonia came on very quickly and she behaved the same way. She is on psychotropic medications and pain medications as well. Nursing Notes werereviewed. REVIEW OF SYSTEMS    (2-9 systems for level 4, 10 or more for level 5)     Review of Systems   Constitutional: Positive for chills, diaphoresis, fatigue and fever. Respiratory: Positive for cough. Cardiovascular: Negative. Gastrointestinal: Negative. Endocrine: Positive for cold intolerance. Genitourinary: Negative. Neurological: Positive for weakness. Psychiatric/Behavioral: Positive for decreased concentration. Except as noted above the remainder of the review of systems was reviewed and negative.        PAST MEDICAL HISTORY     Past Medical History:   Diagnosis Date    COPD (chronic obstructive pulmonary disease) ([address]     Depression     GERD (gastroesophageal reflux disease)     Osteoporosis          SURGICALHISTORY       Past Surgical History:   Procedure Laterality Date    BACK SURGERY      BREAST SURGERY      CARPAL TUNNEL RELEASE      FRACTURE SURGERY Left 12/20/2018    ORIF wrist    HYSTERECTOMY      JOINT REPLACEMENT      right knee    JOINT REPLACEMENT      WRIST FRACTURE SURGERY Left 12/20/2018    WRIST OPEN REDUCTION INTERNAL FIXATION-DISTAL RADIUS performed by Luz Elena Vidales MD at [facility]       Previous Medications    ALBUTEROL SULFATE  (90 BASE) MCG/ACT INHALER    Inhale 2 puffs into the lungs 4 times daily    ALENDRONATE (FOSAMAX) 70 MG TABLET    Take 70 mg by mouth every 7 days    ASPIRIN 81 MG TABLET    Take 81 mg by mouth daily    CALCIUM CITRATE-VITAMIN D (CITRICAL + D) 315-250 MG-UNIT TABS PER TABLET    Take 1 tablet by mouth 2 times daily (with meals)    DULOXETINE (CYMBALTA) 60 MG EXTENDED RELEASE CAPSULE    Take 60 mg by mouth daily    FUROSEMIDE (LASIX) 40 MG TABLET    Take 40 mg by mouth as needed    HYDROCODONE-ACETAMINOPHEN (NORCO)  MG PER TABLET    Take 1 tablet by mouth every 8 hours as needed for Pain. Conner Villavicencio HYDROXYCHLOROQUINE SULFATE (PLAQUENIL PO)    Take by mouth    LEVOTHYROXINE (SYNTHROID) 150 MCG TABLET    Take 150 mcg by mouth Daily    NONFORMULARY        OXYCODONE-ACETAMINOPHEN (PERCOCET) 5-325 MG PER TABLET    Take 1 tablet by mouth every 4 hours as needed for Pain. PANTOPRAZOLE SODIUM (PROTONIX) 40 MG PACK PACKET    Take 40 mg by mouth 2 times daily (before meals)    POTASSIUM CHLORIDE (KLOR-CON) 20 MEQ PACKET    Take 20 mEq by mouth 2 times daily    PREGABALIN (LYRICA) 100 MG CAPSULE    Take 300 mg by mouth 2 times daily. .    TRAZODONE (DESYREL) 100 MG TABLET    Take 200 mg by mouth nightly             Patient has no known allergies. FAMILY HISTORY     History reviewed. No pertinent family history. SOCIAL HISTORY       Social History     Socioeconomic History    Marital status:      Spouse name: None    Number of children: None    Years of education: None    Highest education level: None   Occupational History    None   Social Needs    Financial resource strain: None    Food insecurity     Worry: None     Inability: None    Transportation needs     Medical: None     Non-medical: None   Tobacco Use    Smoking status: Former Smoker     Last attempt to quit: 1976     Years since quittin.7    Smokeless tobacco: Never Used   Substance and Sexual Activity    Alcohol use: No    Drug use: No    Sexual activity: None   Lifestyle    Physical activity     Days per week: None     Minutes per session: None    Stress: None   Relationships    Social connections     Talks on phone: None     Gets together: None     Attends Muslim service: None     Active member of club or organization: None     Attends meetings of clubs or organizations: None     Relationship status: None    Intimate partner violence     Fear of current or ex partner: None     Emotionally abused: None     Physically abused: None     Forced sexual activity: None   Other Topics Concern    None   Social History Narrative    None       SCREENINGS      Kristy Coma Scale  Eye Opening: Spontaneous  Best Verbal Response: Confused  Best Motor Response: Obeys commands  Kristy Coma Scale Score: 14             PHYSICAL EXAM    (up to 7 for level 4, 8 or more for level 5)     ED Triage Vitals [20 0759]   BP Temp Temp Source Pulse Resp SpO2 Height Weight   (!) 135/57 100.5 °F (38.1 °C) Oral 119 23 (!) 87 % -- --       Physical Exam  Constitutional:       Comments: Elderly female who appears obtunded and drowsy. She does seem to answer questions appropriately however does take a little while and she needs to be prompted over and over.    HENT: Head: Atraumatic. Mouth/Throat:      Mouth: Mucous membranes are moist.   Neck:      Musculoskeletal: Normal range of motion and neck supple. Cardiovascular:      Rate and Rhythm: Tachycardia present. Pulmonary:      Effort: Pulmonary effort is normal.      Breath sounds: Normal breath sounds. Abdominal:      General: Abdomen is flat. Bowel sounds are normal.      Palpations: Abdomen is soft. Musculoskeletal:         General: Tenderness present. Comments: Tender palpation lower back. Neurological:      Mental Status: She is oriented to person, place, and time. Comments: She appears obtunded and drowsy but she does follow commands. DIAGNOSTIC RESULTS     EKG: All EKG's are interpreted by the Emergency Department Physician who either signs orCo-signs this chart in the absence of a cardiologist.        RADIOLOGY:   plain film images such as CT, Ultrasound and MRI are read by the radiologist. Plain radiographic images are visualized and preliminarily interpreted by the emergency physician with the below findings:        Interpretation per the Radiologist below, ifavailable at the time of this note:    XR CHEST PORTABLE   Final Result   Increased right lung opacities may represent pneumonia. Recommend follow-up.                ED BEDSIDE ULTRASOUND:   Performed by ED Physician - none    LABS:  Labs Reviewed   LACTATE, SEPSIS - Abnormal; Notable for the following components:       Result Value    Lactic Acid, Sepsis 2.1 (*)     All other components within normal limits   LACTATE, SEPSIS - Abnormal; Notable for the following components:    Lactic Acid, Sepsis 2.4 (*)     All other components within normal limits   BLOOD GAS, ARTERIAL - Abnormal; Notable for the following components:    pO2, Arterial 62.3 (*)     HCO3, Arterial 28.8 (*)     Positive Base Excess, Art 4.2 (*)     O2 Sat, Arterial 92.6 (*)     All other components within normal limits   CBC WITH AUTO DIFFERENTIAL - several psychotropic medications along with narcotic pain medication. I suspect that we can try a dose of Narcan if she appears to be becoming more obtunded. Amount and/or Complexity of Data Reviewed  Clinical lab tests: ordered and reviewed  Tests in the medicine section of CPT®: ordered and reviewed         CRITICAL CARE TIME   Total CriticalCare time was  minutes, excluding separately reportable procedures. There was a high probability of clinically significant/life threatening deterioration in the patient's condition which required my urgent intervention. CONSULTS:  IP CONSULT TO CASE MANAGEMENT    PROCEDURES:  Unlessotherwise noted below, none     Procedures    FINAL IMPRESSION      1. Altered mental status, unspecified altered mental status type    2. Community acquired pneumonia of right lung, unspecified part of lung    3.  Septicemia [facility])          DISPOSITION/PLAN   DISPOSITION Admitted 08/16/2020 12:36:30 PM      PATIENT REFERRED TO:  Bonne Gowers, MD  [address] [place] [address]  [phone]            DISCHARGE MEDICATIONS:  New Prescriptions    No medications on file              (Please note that portions of this note were completed with a voice recognition program.  Efforts were made to edit the dictations but occasionally words are mis-transcribed.)      Cortez Isbell MD (electronically signed)  Attending Emergency Physician            Cortez Isbell MD  08/16/20 [place]  08/16/20 6501

## 2020-08-16 NOTE — H&P
Kala Hatchet M.D. Internal Medicine History and Physical 8/16/20    Patient:  Vicky Schwarz  MRN: [id number]    Chief Complaint:    Chief Complaint   Patient presents with    Altered Mental Status     Last known well at midnight unable to follow commands, weak temp       History Obtained From:  electronic medical record  PCP: Sagrario Moise MD    History of Present Illness: The patient is a 77 y.o. female who presents to the ER due to altered mental status and fever. She woke up around 3:00 AM this morning in their camper complaining of feeling cold and rigoring. She was confused and became more obtunded as time went on. She was unable to follow commands and so she was brought to ER around 7:00 AM this morning. She complained of fever, chills, cough and feeling quite weak / fatigued. Temp was 100.5, , RR 23, SpO2 87% on RA (up to 91-94% on nasal canula o2). CXR showed right upper lobe pneumonia. BP in ER dropped to 86/32 and she was treated with 30 mg/kg IVF bolus. She was admitted to ICU for sepsis due to pneumonia with encephalopathy and septic shock. Past Medical History:        Diagnosis Date    COPD (chronic obstructive pulmonary disease) (HCC)     Depression     GERD (gastroesophageal reflux disease)     Osteoporosis        Past Surgical History:        Procedure Laterality Date    BACK SURGERY      BREAST SURGERY      CARPAL TUNNEL RELEASE      FRACTURE SURGERY Left 12/20/2018    ORIF wrist    HYSTERECTOMY      JOINT REPLACEMENT      right knee    JOINT REPLACEMENT      WRIST FRACTURE SURGERY Left 12/20/2018    WRIST OPEN REDUCTION INTERNAL FIXATION-DISTAL RADIUS performed by Ryan Brennan MD at [address]       Medications Prior to Admission:    Prior to Admission medications    Medication Sig Start Date End Date Taking?  Authorizing Provider   Hydroxychloroquine Sulfate (PLAQUENIL PO) Take by mouth   Yes Historical Provider, MD   albuterol sulfate  (90 Base) MCG/ACT inhaler Inhale 2 puffs into the lungs 4 times daily 10/8/18  Yes Ba Santos PA-C   levothyroxine (SYNTHROID) 150 MCG tablet Take 150 mcg by mouth Daily   Yes Historical Provider, MD   aspirin 81 MG tablet Take 81 mg by mouth daily   Yes Historical Provider, MD   DULoxetine (CYMBALTA) 60 MG extended release capsule Take 60 mg by mouth daily   Yes Historical Provider, MD   traZODone (DESYREL) 100 MG tablet Take 200 mg by mouth nightly    Yes Historical Provider, MD   pantoprazole sodium (PROTONIX) 40 MG PACK packet Take 40 mg by mouth 2 times daily (before meals)   Yes Historical Provider, MD   potassium chloride (KLOR-CON) 20 MEQ packet Take 20 mEq by mouth 2 times daily   Yes Historical Provider, MD   pregabalin (LYRICA) 100 MG capsule Take 300 mg by mouth 2 times daily. .   Yes Historical Provider, MD   calcium citrate-vitamin D (CITRICAL + D) 315-250 MG-UNIT TABS per tablet Take 1 tablet by mouth 2 times daily (with meals)   Yes Historical Provider, MD   HYDROcodone-acetaminophen (NORCO)  MG per tablet Take 1 tablet by mouth every 8 hours as needed for Pain. .   Yes Historical Provider, MD   oxyCODONE-acetaminophen (PERCOCET) 5-325 MG per tablet Take 1 tablet by mouth every 4 hours as needed for Pain. Historical Provider, MD   furosemide (LASIX) 40 MG tablet Take 40 mg by mouth as needed 4/11/18   Historical Provider, MD   NONFORMULARY     Historical Provider, MD   alendronate (FOSAMAX) 70 MG tablet Take 70 mg by mouth every 7 days    Historical Provider, MD       Allergies:  Patient has no known allergies. Social History:   TOBACCO:   reports that she quit smoking about 43 years ago. She has never used smokeless tobacco.  ETOH:   reports no history of alcohol use. Family History:   History reviewed. No pertinent family history.     Review of Systems:  Unobtainable due to encephalopathy, unresponsiveness    Objective:    Vitals:   Temp: 98.9 °F (37.2 °C)  BP: (!) 101/50  Resp: 18  Pulse: opacities.  Chronic    interstitial lung changes.  Cardiomegaly.         Impression    Increased right lung opacities may represent pneumonia.  Recommend follow-up.                   Assessment and Plan: This patient requires inpatient ICU admission because of Sepsis due to pneumonia [facility])  · Factors affecting the medical complexity of this patient include septic shock, encephalopathy, COPD  · Estimated length of stay is 4 days  · Discussed patient's symptoms and data results including labs and imaging studies with the ER MD at time of admission  · IV rocephin / azithromycin  · Received IV bolus in ER, continue IVF for BP support  · Septic shock  · IV bolus / IVF  · Levophed if needed   · Septic encephalopathy   · Monitor mental status with tx of sepsis / pneumonia  · Per : \"this is how she gets when she gets sick\"  · COPD  · Duonebs  · Nutrition status: Well developed, well nourished  · DVT prophylaxis: Lovenox   · High risk medications: none   · Total critical care time caring for this patient with life threatening, unstable organ failure, including direct patient contact, management of life support systems, review of data including imaging and labs, discussions with other team members and physicians at least 45 minutes so far today, excluding separately billable procedures. CORE MEASURES  · DVT prophylaxis: Lovenox  · Decubitus ulcer present on admission: No  · CODE STATUS: FULL CODE  · Nutrition Status: fair   · Physical therapy: Yes   · Old Charts reviewed: Yes   · EKG Reviewed:  Yes   · Advance Directive Addressed: Yes - in chart    Shyann Clark M.D.  8/16/2020  2:38 PM

## 2020-08-16 NOTE — ED NOTES
Dr. Rosa Cochran returned call. Currently speaking with Dr. Yeny Up.      Marti PARKS Reser  08/16/20 1129

## 2020-08-16 NOTE — ED NOTES
Left message for Dr. Kinza Benitez to return call as hospitalist to Dr. Merry Cooper.      Ian PARKS Reser  08/16/20 [id number]

## 2020-08-17 LAB
ABSOLUTE EOS #: 0.08 K/UL (ref 0–0.44)
ABSOLUTE IMMATURE GRANULOCYTE: 0.12 K/UL (ref 0–0.3)
ABSOLUTE LYMPH #: 1.56 K/UL (ref 1.1–3.7)
ABSOLUTE MONO #: 0.74 K/UL (ref 0.1–1.2)
ALBUMIN SERPL-MCNC: 2.9 G/DL (ref 3.5–5.2)
ALBUMIN/GLOBULIN RATIO: 1.3 (ref 1–2.5)
ALP BLD-CCNC: 43 U/L (ref 35–104)
ALT SERPL-CCNC: 14 U/L (ref 5–33)
ANION GAP SERPL CALCULATED.3IONS-SCNC: 6 MMOL/L (ref 9–17)
AST SERPL-CCNC: 18 U/L
BASOPHILS # BLD: 0 % (ref 0–2)
BASOPHILS ABSOLUTE: <0.03 K/UL (ref 0–0.2)
BILIRUB SERPL-MCNC: 0.37 MG/DL (ref 0.3–1.2)
BUN BLDV-MCNC: 12 MG/DL (ref 8–23)
BUN/CREAT BLD: 20 (ref 9–20)
CALCIUM SERPL-MCNC: 8.1 MG/DL (ref 8.6–10.4)
CHLORIDE BLD-SCNC: 106 MMOL/L (ref 98–107)
CO2: 25 MMOL/L (ref 20–31)
CREAT SERPL-MCNC: 0.59 MG/DL (ref 0.5–0.9)
DIFFERENTIAL TYPE: ABNORMAL
EOSINOPHILS RELATIVE PERCENT: 1 % (ref 1–4)
GFR AFRICAN AMERICAN: >60 ML/MIN
GFR NON-AFRICAN AMERICAN: >60 ML/MIN
GFR SERPL CREATININE-BSD FRML MDRD: ABNORMAL ML/MIN/{1.73_M2}
GFR SERPL CREATININE-BSD FRML MDRD: ABNORMAL ML/MIN/{1.73_M2}
GLUCOSE BLD-MCNC: 104 MG/DL (ref 70–99)
HCT VFR BLD CALC: 37.8 % (ref 36.3–47.1)
HEMOGLOBIN: 12.1 G/DL (ref 11.9–15.1)
IMMATURE GRANULOCYTES: 1 %
LYMPHOCYTES # BLD: 11 % (ref 24–43)
MAGNESIUM: 1.8 MG/DL (ref 1.6–2.6)
MCH RBC QN AUTO: 30.3 PG (ref 25.2–33.5)
MCHC RBC AUTO-ENTMCNC: 32 G/DL (ref 28.4–34.8)
MCV RBC AUTO: 94.7 FL (ref 82.6–102.9)
MONOCYTES # BLD: 5 % (ref 3–12)
NRBC AUTOMATED: 0 PER 100 WBC
PDW BLD-RTO: 16.3 % (ref 11.8–14.4)
PLATELET # BLD: 189 K/UL (ref 138–453)
PLATELET ESTIMATE: ABNORMAL
PMV BLD AUTO: 9.5 FL (ref 8.1–13.5)
POTASSIUM SERPL-SCNC: 3.3 MMOL/L (ref 3.7–5.3)
RBC # BLD: 3.99 M/UL (ref 3.95–5.11)
RBC # BLD: ABNORMAL 10*6/UL
SEG NEUTROPHILS: 82 % (ref 36–65)
SEGMENTED NEUTROPHILS ABSOLUTE COUNT: 11.3 K/UL (ref 1.5–8.1)
SODIUM BLD-SCNC: 137 MMOL/L (ref 135–144)
TOTAL PROTEIN: 5.2 G/DL (ref 6.4–8.3)
WBC # BLD: 13.8 K/UL (ref 3.5–11.3)
WBC # BLD: ABNORMAL 10*3/UL

## 2020-08-17 PROCEDURE — 2000000000 HC ICU R&B

## 2020-08-17 PROCEDURE — 85025 COMPLETE CBC W/AUTO DIFF WBC: CPT

## 2020-08-17 PROCEDURE — 94640 AIRWAY INHALATION TREATMENT: CPT

## 2020-08-17 PROCEDURE — 6370000000 HC RX 637 (ALT 250 FOR IP): Performed by: INTERNAL MEDICINE

## 2020-08-17 PROCEDURE — 94669 MECHANICAL CHEST WALL OSCILL: CPT

## 2020-08-17 PROCEDURE — 83735 ASSAY OF MAGNESIUM: CPT

## 2020-08-17 PROCEDURE — 80053 COMPREHEN METABOLIC PANEL: CPT

## 2020-08-17 PROCEDURE — 97110 THERAPEUTIC EXERCISES: CPT

## 2020-08-17 PROCEDURE — 97162 PT EVAL MOD COMPLEX 30 MIN: CPT

## 2020-08-17 PROCEDURE — 36415 COLL VENOUS BLD VENIPUNCTURE: CPT

## 2020-08-17 PROCEDURE — 97530 THERAPEUTIC ACTIVITIES: CPT

## 2020-08-17 PROCEDURE — 2580000003 HC RX 258: Performed by: INTERNAL MEDICINE

## 2020-08-17 PROCEDURE — 2700000000 HC OXYGEN THERAPY PER DAY

## 2020-08-17 PROCEDURE — 97166 OT EVAL MOD COMPLEX 45 MIN: CPT

## 2020-08-17 PROCEDURE — 6360000002 HC RX W HCPCS: Performed by: INTERNAL MEDICINE

## 2020-08-17 PROCEDURE — 94664 DEMO&/EVAL PT USE INHALER: CPT

## 2020-08-17 RX ORDER — METHYLPREDNISOLONE SODIUM SUCCINATE 125 MG/2ML
60 INJECTION, POWDER, LYOPHILIZED, FOR SOLUTION INTRAMUSCULAR; INTRAVENOUS EVERY 12 HOURS
Status: DISCONTINUED | OUTPATIENT
Start: 2020-08-17 | End: 2020-08-18

## 2020-08-17 RX ADMIN — METHYLPREDNISOLONE SODIUM SUCCINATE 60 MG: 125 INJECTION, POWDER, FOR SOLUTION INTRAMUSCULAR; INTRAVENOUS at 20:26

## 2020-08-17 RX ADMIN — SODIUM CHLORIDE, PRESERVATIVE FREE 10 ML: 5 INJECTION INTRAVENOUS at 20:30

## 2020-08-17 RX ADMIN — PREGABALIN 300 MG: 75 CAPSULE ORAL at 20:26

## 2020-08-17 RX ADMIN — ASPIRIN 81 MG 81 MG: 81 TABLET ORAL at 09:04

## 2020-08-17 RX ADMIN — HYDROCODONE BITARTRATE AND ACETAMINOPHEN 1 TABLET: 10; 325 TABLET ORAL at 18:04

## 2020-08-17 RX ADMIN — AZITHROMYCIN MONOHYDRATE 500 MG: 500 INJECTION, POWDER, LYOPHILIZED, FOR SOLUTION INTRAVENOUS at 23:09

## 2020-08-17 RX ADMIN — TRAZODONE HYDROCHLORIDE 200 MG: 50 TABLET ORAL at 20:25

## 2020-08-17 RX ADMIN — WATER 1 G: 1 INJECTION INTRAMUSCULAR; INTRAVENOUS; SUBCUTANEOUS at 23:09

## 2020-08-17 RX ADMIN — ENOXAPARIN SODIUM 40 MG: 40 INJECTION, SOLUTION INTRAVENOUS; SUBCUTANEOUS at 15:53

## 2020-08-17 RX ADMIN — IPRATROPIUM BROMIDE AND ALBUTEROL SULFATE 1 AMPULE: .5; 3 SOLUTION RESPIRATORY (INHALATION) at 04:32

## 2020-08-17 RX ADMIN — HYDROCODONE BITARTRATE AND ACETAMINOPHEN 1 TABLET: 10; 325 TABLET ORAL at 09:04

## 2020-08-17 RX ADMIN — SODIUM CHLORIDE: 9 INJECTION, SOLUTION INTRAVENOUS at 07:19

## 2020-08-17 RX ADMIN — SODIUM CHLORIDE: 9 INJECTION, SOLUTION INTRAVENOUS at 20:29

## 2020-08-17 RX ADMIN — DULOXETINE HYDROCHLORIDE 60 MG: 60 CAPSULE, DELAYED RELEASE ORAL at 09:04

## 2020-08-17 RX ADMIN — PREGABALIN 300 MG: 75 CAPSULE ORAL at 09:04

## 2020-08-17 RX ADMIN — METHYLPREDNISOLONE SODIUM SUCCINATE 60 MG: 125 INJECTION, POWDER, FOR SOLUTION INTRAMUSCULAR; INTRAVENOUS at 09:05

## 2020-08-17 RX ADMIN — SODIUM CHLORIDE, PRESERVATIVE FREE 10 ML: 5 INJECTION INTRAVENOUS at 09:09

## 2020-08-17 RX ADMIN — SODIUM CHLORIDE: 9 INJECTION, SOLUTION INTRAVENOUS at 15:09

## 2020-08-17 RX ADMIN — LEVOTHYROXINE SODIUM 150 MCG: 150 TABLET ORAL at 07:19

## 2020-08-17 RX ADMIN — IPRATROPIUM BROMIDE AND ALBUTEROL SULFATE 1 AMPULE: .5; 3 SOLUTION RESPIRATORY (INHALATION) at 10:43

## 2020-08-17 RX ADMIN — PANTOPRAZOLE SODIUM 40 MG: 40 TABLET, DELAYED RELEASE ORAL at 07:19

## 2020-08-17 RX ADMIN — IPRATROPIUM BROMIDE AND ALBUTEROL SULFATE 1 AMPULE: .5; 3 SOLUTION RESPIRATORY (INHALATION) at 20:10

## 2020-08-17 ASSESSMENT — PAIN SCALES - GENERAL
PAINLEVEL_OUTOF10: 5
PAINLEVEL_OUTOF10: 0
PAINLEVEL_OUTOF10: 7
PAINLEVEL_OUTOF10: 8
PAINLEVEL_OUTOF10: 6

## 2020-08-17 ASSESSMENT — PAIN DESCRIPTION - LOCATION
LOCATION: BACK
LOCATION: BACK;CHEST
LOCATION: BACK
LOCATION: BACK;CHEST
LOCATION: CHEST

## 2020-08-17 ASSESSMENT — PAIN DESCRIPTION - PAIN TYPE
TYPE: CHRONIC PAIN
TYPE: CHRONIC PAIN;ACUTE PAIN
TYPE: CHRONIC PAIN;ACUTE PAIN
TYPE: CHRONIC PAIN
TYPE: ACUTE PAIN

## 2020-08-17 ASSESSMENT — PAIN DESCRIPTION - DESCRIPTORS
DESCRIPTORS: ACHING

## 2020-08-17 ASSESSMENT — PAIN DESCRIPTION - ORIENTATION
ORIENTATION: MID
ORIENTATION: MID;LOWER
ORIENTATION: MID;LOWER

## 2020-08-17 ASSESSMENT — PAIN DESCRIPTION - FREQUENCY: FREQUENCY: CONTINUOUS

## 2020-08-17 NOTE — PROGRESS NOTES
Occupational Therapy   Occupational Therapy Initial Assessment  Date: 2020   Patient Name: Robert Morton  MRN: [id number]     : 1945    Date of Service: 2020    Discharge Recommendations:  Continue to assess pending progress       Assessment   Performance deficits / Impairments: Decreased safe awareness;Decreased functional mobility ; Decreased balance;Decreased coordination;Decreased ADL status; Decreased cognition;Decreased endurance;Decreased strength  Assessment: Pt is a 77year old female admitted for pneumonia. Pt presents with decreased safety and ability to complete functional transfers and mobility tasks, as well as decreased ADL independence and participation. Pt is unaware of how she got to hospital or where she was at when she came in, but knows where she is at right now. Pt very weak and unsteady on her feet, leaning back and forth when standing at [facility]. Pt would benefit from skilled occupational therapy services to address these deficits. Treatment Diagnosis: generalized weakness  Prognosis: Good  Decision Making: Medium Complexity  OT Education: OT Role;Plan of Care;Transfer Training  Patient Education: Pt educated on hand placement with functional transfers in and out of chair, in addition to OT role and OT POC. Barriers to Learning: Pt very lethargic and fatigued. REQUIRES OT FOLLOW UP: Yes  Activity Tolerance  Activity Tolerance: Patient limited by fatigue  Safety Devices  Safety Devices in place: Yes  Type of devices: Left in chair;Call light within reach           Patient Diagnosis(es): The primary encounter diagnosis was Altered mental status, unspecified altered mental status type. Diagnoses of Community acquired pneumonia of right lung, unspecified part of lung and Septicemia [address] [address] were also pertinent to this visit. has a past medical history of COPD (chronic obstructive pulmonary disease) ([address] Depression, GERD (gastroesophageal reflux disease), and Osteoporosis.    has a past surgical history that includes Hysterectomy; back surgery; Breast surgery; Carpal tunnel release; joint replacement; joint replacement; fracture surgery (Left, 12/20/2018); and Wrist fracture surgery (Left, 12/20/2018). Treatment Diagnosis: generalized weakness      Restrictions  Restrictions/Precautions  Restrictions/Precautions: General Precautions, Fall Risk    Subjective   General  Chart Reviewed: Yes  Patient assessed for rehabilitation services?: Yes  Family / Caregiver Present: No  Referring Practitioner: Dr. Rosalie Sullivan  Diagnosis: Pneumonia  Subjective  Subjective: Pt reports 7/10 pain in back, chest, and legs. Nurse notified. General Comment  Comments: Pt sitting upright in bedside chair on therapist arrival, agreeable to OT evaluation. Social/Functional History  Social/Functional History  Lives With: Spouse  Type of Home: House  Home Layout: Able to Live on Main level with bedroom/bathroom, Two level  Home Access: Stairs to enter without rails  Entrance Stairs - Number of Steps: 1-2  Bathroom Shower/Tub: Tub/Shower unit, Shower chair with back  [employer]: Handicap height  Bathroom Equipment: Grab bars in shower(outside of shower)  Home Equipment: [employer]  ADL Assistance: Independent  Homemaking Assistance: Independent  Ambulation Assistance: Independent  Transfer Assistance: Independent  Active : No  Additional Comments: pt is independent inside her home with ambulation but no community ambulation       Objective   Vision: Within Functional Limits  Hearing: Within functional limits       Observation/Palpation  Observation: on 2L of O2  Functional Mobility  Functional Mobility Comments: Functional mobility not assessed at evaluation. Pt very weak and unsteady on feet. Closing eyes with standing. ADL  Feeding: Setup  Grooming: Moderate assistance  UE Bathing: Moderate assistance  LE Bathing: Maximum assistance  UE Dressing:  Moderate assistance  LE Dressing: Maximum assistance  Toileting: Moderate assistance;Maximum assistance  Additional Comments: Pt states that she is unable to take off her socks at this time. Bed mobility  Comment: Pt in chair for evaluation. Bed mobility not assessed. Transfers  Sit to stand: Moderate assistance  Stand to sit: Contact guard assistance  Transfer Comments: VC required for hand placement with functional transfers. Pt standing at [facility] for less than 15 seconds. Pt closing eyes swaying back and forth, very unsteady on her feet. LUE AROM : Exceptions  LUE General AROM: Pt reports that she is typically able to lift her arms above her head, but is too weak to do so today. Pt with less than 90 degrees AROM shoulder flexion at this time. Left Hand AROM: WFL  RUE AROM : Exceptions  RUE General AROM: Pt reports that she is typically able to lift her arms above her head, but is too weak to do so today. Pt with less than 90 degrees AROM shoulder flexion at this time. Right Hand AROM: WFL  LUE Strength  Gross LUE Strength: Exceptions to [facility]  LUE Strength Comment: Grossly 3/5  RUE Strength  Gross RUE Strength: Exceptions to [facility]  RUE Strength Comment: Grossly 3/5        Plan   Plan  Times per week: 7x/week  Times per day: Daily  Current Treatment Recommendations: Strengthening, ROM, Safety Education & Training, Balance Training, Patient/Caregiver Education & Training, Self-Care / ADL, Endurance Training, Functional Mobility Training    AM-[facility] Score  -[facility] Inpatient Daily Activity Raw Score: 13 (08/17/20 0928)  AM-PAC Inpatient ADL T-Scale Score : 32.03 (08/17/20 0928)  ADL Inpatient CMS 0-100% Score: 63.03 (08/17/20 0928)  ADL Inpatient CMS G-Code Modifier : CL (08/17/20 1996)    Goals  Short term goals  Time Frame for Short term goals: 10 visits  Short term goal 1: Pt will complete UB/LB bathing SUP with use of AE PRN to improve safety and independence with ADL tasks.   Short term goal 2: Pt will tolerate standing while engaging in ADL or functional task for greater than 3 minutes without LOB to improve safety and endurance with ADL and functional mobility. Short term goal 3: Pt will engage in 15 minutes BUE therex/theract to improve UB strength for increased independence with ADL tasks. Short term goal 4: Pt will complete toileting routine [place] to ensure safe return home. Short term goal 5: Pt will complete ambulation during ADL tasks with SUP and LRAD to improve safety and independence.        Therapy Time   Individual Concurrent Group Co-treatment   Time In 0845         Time Out 0859         Minutes [address] [place] [address] Dr Ida Tapia

## 2020-08-17 NOTE — PROGRESS NOTES
Physical Therapy    Facility/Department: [facility] AT THE [facility]  Initial Assessment    NAME: Nanette Alcantar  : 1945  MRN: [id number]    Date of Service: 2020    Discharge Recommendations:  Continue to assess pending progress        Assessment   Body structures, Functions, Activity limitations: Decreased functional mobility ; Decreased balance;Decreased strength;Decreased high-level IADLs;Decreased endurance  Assessment: Pt is a 77year old female that was admitted to the hospital for pneumonia. Pt presents with general weakness, decreased ambulation tolerance and reduced dynamic standing balance. Pt will benefit from skilled PT in order to address these deficits. Treatment Diagnosis: general weakness  Prognosis: Good  Decision Making: Medium Complexity  PT Education: Goals;PT Role;Plan of Care  REQUIRES PT FOLLOW UP: Yes  Activity Tolerance  Activity Tolerance: Patient Tolerated treatment well       Patient Diagnosis(es): The primary encounter diagnosis was Altered mental status, unspecified altered mental status type. Diagnoses of Community acquired pneumonia of right lung, unspecified part of lung and Septicemia ([address] were also pertinent to this visit. has a past medical history of COPD (chronic obstructive pulmonary disease) ([address] Depression, GERD (gastroesophageal reflux disease), and Osteoporosis. has a past surgical history that includes Hysterectomy; back surgery; Breast surgery; Carpal tunnel release; joint replacement; joint replacement; fracture surgery (Left, 2018); and Wrist fracture surgery (Left, 2018).     Restrictions  Restrictions/Precautions  Restrictions/Precautions: General Precautions, Fall Risk  Vision/Hearing  Vision: Within Functional Limits  Hearing: Within functional limits     Subjective  General  Chart Reviewed: Yes  Family / Caregiver Present: No  Referring Practitioner: Dr. Amanda Tejada  Referral Date : 20  Diagnosis: pneumonia  Subjective  Subjective: pt denies pain but reports fatigue  Pain Screening  Patient Currently in Pain: Denies  Vital Signs  Patient Currently in Pain: Denies       Orientation  Orientation  Overall Orientation Status: Within Normal Limits  Social/Functional History  Social/Functional History  Lives With: Spouse  Type of Home: House  Home Layout: Able to Live on Main level with bedroom/bathroom, Two level  Home Equipment: Cane  ADL Assistance: Independent  Homemaking Assistance: Independent  Ambulation Assistance: Independent  Transfer Assistance: Independent  Active : No  Additional Comments: pt is independent inside her home with ambulation but no community ambulation  Cognition        Objective     Observation/Palpation  Observation: on 2L of O2    AROM RLE (degrees)  RLE AROM: WFL  AROM LLE (degrees)  LLE AROM : WFL  Strength LLE  Comment: grossly 4-/5  Strength RUE  Comment: grossly 4-/5        Bed mobility  Supine to Sit: Contact guard assistance  Transfers  Sit to Stand: Contact guard assistance  Stand to sit: Contact guard assistance  Ambulation  Ambulation?: Yes  WB Status: unrestricted  Ambulation 1  Surface: level tile  Device: Hand-Held Assist  Assistance: Contact guard assistance  Distance: 5 steps to chair     Balance  Posture: Fair  Sitting - Static: Good  Sitting - Dynamic: Good  Standing - Static: Fair  Standing - Dynamic: Fair;-        Plan   Plan  Times per week: 7x/wk  Times per day: Twice a day  Plan weeks: 2x/daily except weekends 1x/daily  Current Treatment Recommendations: Strengthening, Home Exercise Program, Neuromuscular Re-education, Safety Education & Training, Balance Training, Endurance Training, Patient/Caregiver Education & Training, Functional Mobility Training, Transfer Training, Gait Training, Stair training  Safety Devices  Type of devices: Call light within reach, Left in chair    G-Code       OutComes Score                                                  AM-PAC Score             Goals  Short term goals  Time Frame for Short term goals: 10 days  Short term goal 1: Pt will be educated on her POC  Short term goal 2: Pt will perform all transfers Mod I in order to increase independence  Short term goal 3: Pt will ambulate 100 feet with LRAD SBA in order to use the bathroom  Short term goal 4: Pt will increase dynamic standing balance to Fair+ in order to reduce fall risk  Patient Goals   Patient goals : \"to feel better\"       Therapy Time   Individual Concurrent Group Co-treatment   Time In 0722         Time Out 0740         Minutes 1225 [facility],

## 2020-08-17 NOTE — PROGRESS NOTES
Discussed discharge plans with the patient's . Patient is sleeping. Patient is a 77year old female here with Sepsis due to pneumonia. She is alert and oriented. Patient is  and lives at home with her . She uses no medical equipment. Patient does the cleaning and her  does the cooking. She is independent with her ADL's. Patient manages her own medication and drives. Her PCP is Dr. Bonne Gowers, MD. She has medical insurance that helps with medication costs. The discharge plan is home with no services. She does not have advance directives and is not interested in them at this time. LSW to monitor and assist with any needs or concerns as they arise.     BUZZ Watson

## 2020-08-17 NOTE — PROGRESS NOTES
Aroused easily. Up to [facility] with assistance. Gait steady. Respirations easy and unlabored. Lungs diminished in bases with expiratory wheeze noted in left posterior base. Oxygen on at 2 liters. Voided large amount of maral urine.

## 2020-08-17 NOTE — PROGRESS NOTES
Abdiaziz Austin M.D. ICU Progress Note 20    SUBJECTIVE:    Pt seen and examined in the ICU for follow up of Sepsis due to pneumonia [facility]). This morning she is more alert and sitting up in chair, able to answer questions. Her BP dropped to 72 systolic overnight, but came back up with just IVFs. She did not require vasopressors. Today she states she is coughing but not SOB. She transferred from bed to chair well, with no unsteadiness per nursing. She complains of some chest pain with coughing. Denies nausea, vomiting, abd pain or diarrhea. ROS:   Constitutional: negative  for fevers, and negative for chills. Respiratory: negative for shortness of breath, positive for cough, and negative for wheezing  Cardiovascular: positive for chest pain (with cough), and negative for palpitations  Gastrointestinal: negative for abdominal pain, negative for nausea,negative for vomiting, negative for diarrhea, and negative for constipation    All other systems were reviewed with the patient and are negative unless otherwise stated in HPI. OBJECTIVE:    Vitals:   Temp: 98.7 °F (37.1 °C)  Temp range:    Temp  Av.9 °F (37.2 °C)  Min: 98.2 °F (36.8 °C)  Max: 100.9 °F (38.3 °C)    BP: (!) 100/46  BP Range:      Systolic (94CRN), MZY:23 , Min:72 , XKC:852      Diastolic (87EQD), QGB:37, Min:5, Max:71    Pulse: 92  Pulse Range:    Pulse  Av.5  Min: 81  Max: 116    Resp: 20  Resp Range:   Resp  Av.6  Min: 16  Max: 22    SpO2: 94 % on supplemental O2  SpO2 range:   SpO2  Av.3 %  Min: 86 %  Max: 97 %    24HR INTAKE/OUTPUT:      Intake/Output Summary (Last 24 hours) at 2020 0810  Last data filed at 2020 0735  Gross per 24 hour   Intake 5043 ml   Output 850 ml   Net 4193 ml     -----------------------------------------------------------------  Exam:  GEN:    Awake, alert and oriented x3. EYES:  EOMI, pupils equal   NECK: Supple. No lymphadenopathy.   No carotid bruit  CVS:    regular rate and rhythm, no audible murmur  PULM:  diminished with bilateral expiratory wheezing, no acute respiratory distress  ABD:    Bowels sounds normal.  Abdomen is soft. No distention. no tenderness to palpation. EXT:   no edema bilaterally . No calf tenderness. NEURO: Moves all extremities. Motor and sensory are grossly intact  SKIN:  No rashes. No skin lesions.     -----------------------------------------------------------------  Diagnostic Data:    · All lines, drips, IV sites and medications reviewed  · All available data reviewed  Lab Results   Component Value Date    WBC 13.8 (H) 08/17/2020    HGB 12.1 08/17/2020    MCV 94.7 08/17/2020     08/17/2020     Lab Results   Component Value Date    SEGS 82 (H) 08/17/2020    LYMPHOPCT 11 (L) 08/17/2020    MONOPCT 5 08/17/2020    EOSRELPCT 1 08/17/2020    BASOPCT 0 08/17/2020    NEUTROABS 11.30 (H) 08/17/2020      Lab Results   Component Value Date    GLUCOSE 104 (H) 08/17/2020    BUN 12 08/17/2020    CREATININE 0.59 08/17/2020     08/17/2020    K 3.3 (L) 08/17/2020    CALCIUM 8.1 (L) 08/17/2020     08/17/2020    CO2 25 08/17/2020     Lab Results   Component Value Date    LACTA 1.8 08/16/2020         ASSESSMENT / PLAN:  · Sepsis due to pneumonia  ? IV rocephin / azithromycin  ? Received IV bolus in ER, continue IVF for BP support  · Septic shock  ? IV bolus / IVF  ? Vasopressors have not been needed  · Septic encephalopathy   ? mental status is improving with tx of sepsis / pneumonia  · Acute respiratory failure with hypoxia  ? Supplemental O2  · COPD  ? Duonebs  ? Add solumedrol due to wheezing, pleuritic pain  · Nutrition status:  Well developed, well nourished  · DVT prophylaxis: Lovenox   · High risk medications: none   · Total critical care time caring for this patient with life threatening, unstable organ failure, including direct patient contact, management of life support systems, review of data including imaging and labs, discussions with

## 2020-08-17 NOTE — PLAN OF CARE
Awake. Called out to go to bathroom. Assisted up to [facility]. Gait weak but steady. Voided and had large BM. Returned to bed. Oxygen saturation on room air and after exertion 94%. Oxygen remains off. Lungs diminished throughout. Heart tones strong and regular. Oriented to self, situation, and location. Confused on date. C/O neck pain, rates at #8. Temp elevated. Tylenol 650 mg and oxycodone 10/325 po given. Took pills without difficulty. Mucous membranes are pale and dry.

## 2020-08-17 NOTE — PROGRESS NOTES
[]  Ineffective and, or greater than 25 ml sputum prod. past 24 hrs. []  Nonspon- taneous; Requires suctioning 0   Pulmonary History  (PULM HX) []  No smoking and no chronic pulmonaryhistory []  Former smoker. Quit over 12 mos. ago []  Current smoker or quit w/ in 12 mos []  Pulm. History and, or 20 pk/yr smoking hx [x]  Admitted w/ acute pulm. dx and, or has been admitted w/ pulm. dx 2 or more times over past 12 mos 4   Surgical History this Admit  (SURG HX) [x]  No surgery []  General surgery []  Lower abdominal []  Thoracic or upper abdominal   []  Thoracic w/ pulm. disease 0   Chest X-Ray (CXR)/CT Scan []  Clear or not applicable []  Not available []  Atelect- asis or pleural effusions [x]  Localized infiltrate or pulm. edema []  Con-solidated Infiltrates, bilateral, or in more than 1 lobe 3   Slow or Forced VC, FEV1 OR PEFR (PULM FXN)  [x]  80% or greater, or not indicated []  Pt. unable to perform []  FEV1 or PEFR or VC 51-79%. []  FEV1 or PEFR or VC  30-49%   []  FEV1 or PEFR or VC less than 30%   0   TOTAL ACUITY: 10       CARE PLAN    If Acuity Level is 2, 3, or 4 in any of the following:    [] BILATERAL BREATH SOUNDS (BBS)     [] PULMONARY HISTORY (PULM HX)  [] PULMONARY FUNCTION (PULM FX)    Goal: Improve respiratory functions in patients with airway disease and decrease WOB    [x] AEROSOL PROTOCOL    Total Acuity:   16-32  []  Secondary Assessment in 24 hrs Total Acuity:  9-15  [x]  Secondary Assessment in 24 hrs Total Acuity:  4-8  []  Secondary Assessment in 48 hrs Total Acuity:  0-3  []  Secondary Assessment in 72 hrs   HHN AEROSOL THERAPY with  [physician-ordered bronchodilator(s)] q 4 & Albuterol PRN q2 hrs. Breath-Actuated Neb if BBS Acuity = 4, and pt. can use MP. Notify physician if condition deteriorates. HHN AEROSOL THERAPY with  [physician-ordered bronchodilator(s)]  QID and Albuterol PRN q4 hrs. Breath-Actuated Neb if BBS Acuity = 4, and pt. can use MP.   Notify physician if condition deteriorates. MDI THERAPY with  2 actuations of [physician-ordered bronchodilator(s)] via spacer TID Albuterol and PRNq4 hrs. If unable to utilize MDI: HHN [physician-ordered bronchodilator(s)] TID and Albuterol PRN q4 hrs. Notify physician if condition deteriorates. MDI THERAPY with  [physician-ordered bronchodilator(s)] via spacer TID PRN. If unable to utilize MDI: HHN [physician-ordered bronchodilator(s)] TID PRN. Notify physician if condition deteriorates. If Acuity Level is 2, 3, or 4 in any of the following:    [] COUGH     [] SURGICAL HISTORY (SURG HX)  [] CHEST XRAY (CXR)    Goal: Improvement in sputum mobilization in patients with ineffective airway clearance. Reverse atelectasis. [x] Bronchopulmonary Hygiene Protocol    Total Acuity:   16-32  []  Secondary Assessment in 24 hrs Total Acuity:  9-15  [x]  Secondary Assessment in 24 hrs Total Acuity:  4-8  []  Secondary Assessment in 48 hrs Total Acuity:  0-3  []  Secondary Assessment in 72 hrs   METANEB QID with [physician-ordered bronchodilator(s)] if CXR Acuity = 4; otherwise:  PD&P, PEP, or Vest QID & PRN  NT Sxn PRN for ineffective cough  METANEB QID with [physician-ordered bronchodilator(s)] if CXR Acuity = 4; otherwise:  PD&P, PEP, or Vest TID & PRN  NT Sxn PRN for ineffective cough  Instruct patient to self-perform IS q1hr WA   Directed Cough self-performed q1hr WA     If Acuity Level is 2 or above in the following:    [] PULMONARY HISTORY (PULM HX)    Goal: Assist patient in quitting smoking to slow or stop the progression of lung disease.     [] Smoking Cessation Protocol    SMOKING CESSATION EDUCATION provided according to policy [id number]_884: (marlyn with an X)  ____Yes    ____ No     ____ NA    Smoking Cessation Booklet given:  ____Yes  ____No ____Patient Radha Enter

## 2020-08-17 NOTE — PLAN OF CARE
Pain:  Description: Pain management should include both nonpharmacologic and pharmacologic interventions. Goal: Control of acute pain  Description: Control of acute pain  Outcome: Ongoing  Note: Patient complains of ongoing chronic back pain, states it is as always at home. Problem: Infection - Ventilator-Associated Pneumonia:  Goal: Absence of pulmonary infection  Description: Absence of pulmonary infection  Outcome: Completed  Note: Patient is not ventilated.

## 2020-08-17 NOTE — PLAN OF CARE
Problem: Venous Thromboembolism:  Goal: Absence of signs or symptoms of impaired coagulation  Description: Absence of signs or symptoms of impaired coagulation  8/17/2020 1359 by Jozef Mcgregor RN  Outcome: Met This Shift  8/17/2020 0007 by Sara Christensen RN  Outcome: Ongoing     Problem: Discharge Planning:  Goal: Discharged to appropriate level of care  Description: Discharged to appropriate level of care  8/17/2020 1359 by Jozef Mcgregor RN  Outcome: Ongoing  Note: Patient able to participate in own care decisions. Resides with . Case management for discharge needs. 8/17/2020 0007 by Sara Christensen RN  Outcome: Ongoing     Problem: Gas Exchange - Impaired:  Goal: Levels of oxygenation will improve  Description: Levels of oxygenation will improve  8/17/2020 1359 by Jozef Mcgregor RN  Outcome: Ongoing  Note: SAO2 mid to high 90's with oxygen at 2 liters per nasal cannula. Respirations non-labored at rest.  Slight dyspnea with transfer to chair. States \"feel like I am short of breath\". 8/17/2020 0007 by Sara Christensen RN  Outcome: Ongoing     Problem: Infection, Septic Shock:  Goal: Will show no infection signs and symptoms  Description: Will show no infection signs and symptoms  8/17/2020 1359 by Jozef Mcgregor RN  Outcome: Ongoing  Note: Monitoring vital signs and labs as ordered. WBC 13.8 today. Non-productive cough. 8/17/2020 0007 by Sara Christensen RN  Outcome: Ongoing     Problem: Tissue Perfusion, Altered:  Goal: Circulatory function within specified parameters  Description: Circulatory function within specified parameters  8/17/2020 1359 by Jozef Mcgregor RN  Outcome: Ongoing  Note: Monitoring vital signs hourly and as needed. Peripheral pulses palpable. Non-pitting edema to hands, fingers.   8/17/2020 0007 by Sara Christensen RN  Outcome: Ongoing     Problem: Venous Thromboembolism:  Goal: Will show no signs or symptoms of venous thromboembolism  Description: Will show no signs

## 2020-08-17 NOTE — PROGRESS NOTES
Component Value Date     08/17/2020    K 3.3 (L) 08/17/2020     08/17/2020    CO2 25 08/17/2020    BUN 12 08/17/2020    CREATININE 0.59 08/17/2020    GLUCOSE 104 (H) 08/17/2020    CALCIUM 8.1 (L) 08/17/2020    PROT 5.2 (L) 08/17/2020    LABALBU 2.9 (L) 08/17/2020    BILITOT 0.37 08/17/2020    ALKPHOS 43 08/17/2020    AST 18 08/17/2020    ALT 14 08/17/2020    LABGLOM >60 08/17/2020    GFRAA >60 08/17/2020    GLOB NOT REPORTED 06/10/2019     No results found for: LABA1C  No results found for: EAG    Nutrition Interventions:   Food and/or Nutrient Delivery:  Continue Current Diet, Start Oral Nutrition Supplement  Nutrition Education/Counseling:  No recommendation at this time   Coordination of Nutrition Care:  Continued Inpatient Monitoring    Goals:  PO >75% meals and supplement use after solids       Nutrition Monitoring and Evaluation:   Behavioral-Environmental Outcomes:  (none)   Food/Nutrient Intake Outcomes:  Food and Nutrient Intake, Supplement Intake  Physical Signs/Symptoms Outcomes:  Biochemical Data, Weight     Discharge Planning:     Too soon to determine     Electronically signed by Gogo Myers RD, LD on 8/17/20 at 11:26 AM EDT    Contact: [phone]

## 2020-08-17 NOTE — PROGRESS NOTES
Physical Therapy  Facility/Department: [facility] AT THE [facility]  Daily Treatment Note  NAME: Alexis Hagan  : 1945  MRN: [id number]    Date of Service: 2020    Discharge Recommendations:  Continue to assess pending progress        Assessment      Activity Tolerance  Activity Tolerance: Patient Tolerated treatment well     Patient Diagnosis(es): The primary encounter diagnosis was Altered mental status, unspecified altered mental status type. Diagnoses of Community acquired pneumonia of right lung, unspecified part of lung and Septicemia ([address] were also pertinent to this visit. has a past medical history of COPD (chronic obstructive pulmonary disease) ([address] Depression, GERD (gastroesophageal reflux disease), and Osteoporosis. has a past surgical history that includes Hysterectomy; back surgery; Breast surgery; Carpal tunnel release; joint replacement; joint replacement; fracture surgery (Left, 2018); and Wrist fracture surgery (Left, 2018).     Restrictions  Restrictions/Precautions  Restrictions/Precautions: General Precautions, Fall Risk  Subjective   General  Chart Reviewed: Yes  Family / Caregiver Present: Yes  Referring Practitioner: Dr. Kvng Silver  Subjective  Subjective: pt denies pain but reports fatigue          Orientation  Orientation  Overall Orientation Status: Within Normal Limits  Cognition      Objective   Bed mobility  Supine to Sit: Contact guard assistance  Sit to Supine: Contact guard assistance  Comment: Min vc's for safe technique with lines/IV pole  Transfers  Sit to Stand: Contact guard assistance  Stand to sit: Contact guard assistance  Stand Pivot Transfers: Contact guard assistance        Balance  Sitting - Static: Good  Sitting - Dynamic: Good  Standing - Static: Fair  Standing - Dynamic: Fair;-  Exercises  Hip Flexion: 15x  Hip Abduction: 15x  Knee Long Arc Quad: 15x  Ankle Pumps: 15x  Comments: Seated at EOB B LE ther ex x15 with vc's for safe technique Goals  Short term goals  Time Frame for Short term goals: 10 days  Short term goal 1: Pt will be educated on her POC  Short term goal 2: Pt will perform all transfers Mod I in order to increase independence  Short term goal 3: Pt will ambulate 100 feet with LRAD SBA in order to use the bathroom  Short term goal 4: Pt will increase dynamic standing balance to Fair+ in order to reduce fall risk  Patient Goals   Patient goals : \"to feel better\"    Plan    Plan  Times per week: 7x/wk  Times per day: Twice a day  Plan weeks: 2x/daily except weekends 1x/daily  Current Treatment Recommendations: Strengthening, Home Exercise Program, Neuromuscular Re-education, Safety Education & Training, Balance Training, Endurance Training, Patient/Caregiver Education & Training, Functional Mobility Training, Transfer Training, Gait Training, Stair training  Safety Devices  Type of devices:  All fall risk precautions in place, Patient at risk for falls, Left in bed, Gait belt, Call light within reach     Therapy Time   Individual Concurrent Group Co-treatment   Time In 1442         Time Out 1510         Minutes 28         Timed Code Treatment Minutes: Margaret 1153, PT, DPT

## 2020-08-18 ENCOUNTER — APPOINTMENT (OUTPATIENT)
Dept: GENERAL RADIOLOGY | Age: 75
DRG: 871 | End: 2020-08-18
Payer: MEDICARE

## 2020-08-18 LAB
ABSOLUTE EOS #: 0 K/UL (ref 0–0.44)
ABSOLUTE IMMATURE GRANULOCYTE: 0 K/UL (ref 0–0.3)
ABSOLUTE LYMPH #: 0.52 K/UL (ref 1.1–3.7)
ABSOLUTE MONO #: 0 K/UL (ref 0.1–1.2)
ALBUMIN SERPL-MCNC: 3.2 G/DL (ref 3.5–5.2)
ALBUMIN/GLOBULIN RATIO: 1.1 (ref 1–2.5)
ALP BLD-CCNC: 49 U/L (ref 35–104)
ALT SERPL-CCNC: 18 U/L (ref 5–33)
ANION GAP SERPL CALCULATED.3IONS-SCNC: 9 MMOL/L (ref 9–17)
AST SERPL-CCNC: 22 U/L
BASOPHILS # BLD: 0 % (ref 0–2)
BASOPHILS ABSOLUTE: 0 K/UL (ref 0–0.2)
BILIRUB SERPL-MCNC: 0.18 MG/DL (ref 0.3–1.2)
BUN BLDV-MCNC: 10 MG/DL (ref 8–23)
BUN/CREAT BLD: 24 (ref 9–20)
CALCIUM SERPL-MCNC: 8.5 MG/DL (ref 8.6–10.4)
CHLORIDE BLD-SCNC: 110 MMOL/L (ref 98–107)
CO2: 22 MMOL/L (ref 20–31)
CREAT SERPL-MCNC: 0.41 MG/DL (ref 0.5–0.9)
DIFFERENTIAL TYPE: ABNORMAL
EOSINOPHILS RELATIVE PERCENT: 0 % (ref 1–4)
GFR AFRICAN AMERICAN: >60 ML/MIN
GFR NON-AFRICAN AMERICAN: >60 ML/MIN
GFR SERPL CREATININE-BSD FRML MDRD: ABNORMAL ML/MIN/{1.73_M2}
GFR SERPL CREATININE-BSD FRML MDRD: ABNORMAL ML/MIN/{1.73_M2}
GLUCOSE BLD-MCNC: 154 MG/DL (ref 70–99)
HCT VFR BLD CALC: 36.6 % (ref 36.3–47.1)
HEMOGLOBIN: 11.7 G/DL (ref 11.9–15.1)
IMMATURE GRANULOCYTES: 0 %
LYMPHOCYTES # BLD: 4 % (ref 24–43)
MCH RBC QN AUTO: 29.8 PG (ref 25.2–33.5)
MCHC RBC AUTO-ENTMCNC: 32 G/DL (ref 28.4–34.8)
MCV RBC AUTO: 93.4 FL (ref 82.6–102.9)
MONOCYTES # BLD: 0 % (ref 3–12)
MORPHOLOGY: NORMAL
NRBC AUTOMATED: 0 PER 100 WBC
PDW BLD-RTO: 16.1 % (ref 11.8–14.4)
PLATELET # BLD: 196 K/UL (ref 138–453)
PLATELET ESTIMATE: ABNORMAL
PMV BLD AUTO: 9.8 FL (ref 8.1–13.5)
POTASSIUM SERPL-SCNC: 3.6 MMOL/L (ref 3.7–5.3)
RBC # BLD: 3.92 M/UL (ref 3.95–5.11)
RBC # BLD: ABNORMAL 10*6/UL
SEG NEUTROPHILS: 96 % (ref 36–65)
SEGMENTED NEUTROPHILS ABSOLUTE COUNT: 12.48 K/UL (ref 1.5–8.1)
SODIUM BLD-SCNC: 141 MMOL/L (ref 135–144)
TOTAL PROTEIN: 6.1 G/DL (ref 6.4–8.3)
WBC # BLD: 13 K/UL (ref 3.5–11.3)
WBC # BLD: ABNORMAL 10*3/UL

## 2020-08-18 PROCEDURE — 2000000000 HC ICU R&B

## 2020-08-18 PROCEDURE — 94669 MECHANICAL CHEST WALL OSCILL: CPT

## 2020-08-18 PROCEDURE — 97116 GAIT TRAINING THERAPY: CPT

## 2020-08-18 PROCEDURE — 97535 SELF CARE MNGMENT TRAINING: CPT

## 2020-08-18 PROCEDURE — 2700000000 HC OXYGEN THERAPY PER DAY

## 2020-08-18 PROCEDURE — 85025 COMPLETE CBC W/AUTO DIFF WBC: CPT

## 2020-08-18 PROCEDURE — 94640 AIRWAY INHALATION TREATMENT: CPT

## 2020-08-18 PROCEDURE — 6370000000 HC RX 637 (ALT 250 FOR IP): Performed by: INTERNAL MEDICINE

## 2020-08-18 PROCEDURE — 80053 COMPREHEN METABOLIC PANEL: CPT

## 2020-08-18 PROCEDURE — 6360000002 HC RX W HCPCS: Performed by: INTERNAL MEDICINE

## 2020-08-18 PROCEDURE — 94664 DEMO&/EVAL PT USE INHALER: CPT

## 2020-08-18 PROCEDURE — 2580000003 HC RX 258: Performed by: NURSE PRACTITIONER

## 2020-08-18 PROCEDURE — 97110 THERAPEUTIC EXERCISES: CPT

## 2020-08-18 PROCEDURE — 36415 COLL VENOUS BLD VENIPUNCTURE: CPT

## 2020-08-18 PROCEDURE — 94760 N-INVAS EAR/PLS OXIMETRY 1: CPT

## 2020-08-18 PROCEDURE — 71046 X-RAY EXAM CHEST 2 VIEWS: CPT

## 2020-08-18 PROCEDURE — 2580000003 HC RX 258: Performed by: INTERNAL MEDICINE

## 2020-08-18 RX ORDER — HYDROCODONE BITARTRATE AND ACETAMINOPHEN 10; 325 MG/1; MG/1
1 TABLET ORAL EVERY 6 HOURS PRN
Status: DISCONTINUED | OUTPATIENT
Start: 2020-08-18 | End: 2020-08-21 | Stop reason: HOSPADM

## 2020-08-18 RX ORDER — METHYLPREDNISOLONE SODIUM SUCCINATE 125 MG/2ML
60 INJECTION, POWDER, LYOPHILIZED, FOR SOLUTION INTRAMUSCULAR; INTRAVENOUS EVERY 8 HOURS
Status: DISCONTINUED | OUTPATIENT
Start: 2020-08-18 | End: 2020-08-21 | Stop reason: HOSPADM

## 2020-08-18 RX ADMIN — METHYLPREDNISOLONE SODIUM SUCCINATE 60 MG: 125 INJECTION, POWDER, FOR SOLUTION INTRAMUSCULAR; INTRAVENOUS at 09:00

## 2020-08-18 RX ADMIN — SODIUM CHLORIDE, PRESERVATIVE FREE 10 ML: 5 INJECTION INTRAVENOUS at 09:02

## 2020-08-18 RX ADMIN — SODIUM CHLORIDE: 9 INJECTION, SOLUTION INTRAVENOUS at 21:36

## 2020-08-18 RX ADMIN — HYDROCODONE BITARTRATE AND ACETAMINOPHEN 1 TABLET: 10; 325 TABLET ORAL at 15:06

## 2020-08-18 RX ADMIN — IPRATROPIUM BROMIDE AND ALBUTEROL SULFATE 1 AMPULE: .5; 3 SOLUTION RESPIRATORY (INHALATION) at 10:24

## 2020-08-18 RX ADMIN — PREGABALIN 300 MG: 75 CAPSULE ORAL at 21:33

## 2020-08-18 RX ADMIN — WATER 1 G: 1 INJECTION INTRAMUSCULAR; INTRAVENOUS; SUBCUTANEOUS at 23:07

## 2020-08-18 RX ADMIN — METHYLPREDNISOLONE SODIUM SUCCINATE 60 MG: 125 INJECTION, POWDER, FOR SOLUTION INTRAMUSCULAR; INTRAVENOUS at 23:09

## 2020-08-18 RX ADMIN — PREGABALIN 300 MG: 75 CAPSULE ORAL at 09:02

## 2020-08-18 RX ADMIN — IPRATROPIUM BROMIDE AND ALBUTEROL SULFATE 1 AMPULE: .5; 3 SOLUTION RESPIRATORY (INHALATION) at 16:30

## 2020-08-18 RX ADMIN — HYDROCODONE BITARTRATE AND ACETAMINOPHEN 1 TABLET: 10; 325 TABLET ORAL at 07:16

## 2020-08-18 RX ADMIN — IPRATROPIUM BROMIDE AND ALBUTEROL SULFATE 1 AMPULE: .5; 3 SOLUTION RESPIRATORY (INHALATION) at 05:19

## 2020-08-18 RX ADMIN — LEVOTHYROXINE SODIUM 150 MCG: 150 TABLET ORAL at 07:16

## 2020-08-18 RX ADMIN — IPRATROPIUM BROMIDE AND ALBUTEROL SULFATE 1 AMPULE: .5; 3 SOLUTION RESPIRATORY (INHALATION) at 19:22

## 2020-08-18 RX ADMIN — DULOXETINE HYDROCHLORIDE 60 MG: 60 CAPSULE, DELAYED RELEASE ORAL at 09:02

## 2020-08-18 RX ADMIN — AZITHROMYCIN MONOHYDRATE 500 MG: 500 INJECTION, POWDER, LYOPHILIZED, FOR SOLUTION INTRAVENOUS at 23:10

## 2020-08-18 RX ADMIN — ENOXAPARIN SODIUM 40 MG: 40 INJECTION, SOLUTION INTRAVENOUS; SUBCUTANEOUS at 13:15

## 2020-08-18 RX ADMIN — ASPIRIN 81 MG 81 MG: 81 TABLET ORAL at 09:02

## 2020-08-18 RX ADMIN — TRAZODONE HYDROCHLORIDE 200 MG: 50 TABLET ORAL at 21:33

## 2020-08-18 RX ADMIN — SODIUM CHLORIDE, PRESERVATIVE FREE 10 ML: 5 INJECTION INTRAVENOUS at 21:35

## 2020-08-18 RX ADMIN — HYDROCODONE BITARTRATE AND ACETAMINOPHEN 1 TABLET: 10; 325 TABLET ORAL at 21:36

## 2020-08-18 RX ADMIN — PANTOPRAZOLE SODIUM 40 MG: 40 TABLET, DELAYED RELEASE ORAL at 07:16

## 2020-08-18 RX ADMIN — SODIUM CHLORIDE: 9 INJECTION, SOLUTION INTRAVENOUS at 11:21

## 2020-08-18 RX ADMIN — METHYLPREDNISOLONE SODIUM SUCCINATE 60 MG: 125 INJECTION, POWDER, FOR SOLUTION INTRAMUSCULAR; INTRAVENOUS at 15:06

## 2020-08-18 RX ADMIN — SODIUM CHLORIDE: 9 INJECTION, SOLUTION INTRAVENOUS at 04:03

## 2020-08-18 ASSESSMENT — PAIN DESCRIPTION - LOCATION
LOCATION: BACK

## 2020-08-18 ASSESSMENT — PAIN DESCRIPTION - PAIN TYPE
TYPE: CHRONIC PAIN

## 2020-08-18 ASSESSMENT — PAIN SCALES - GENERAL
PAINLEVEL_OUTOF10: 6
PAINLEVEL_OUTOF10: 6
PAINLEVEL_OUTOF10: 7
PAINLEVEL_OUTOF10: 0
PAINLEVEL_OUTOF10: 5
PAINLEVEL_OUTOF10: 6
PAINLEVEL_OUTOF10: 7
PAINLEVEL_OUTOF10: 6
PAINLEVEL_OUTOF10: 5

## 2020-08-18 ASSESSMENT — PAIN DESCRIPTION - DESCRIPTORS
DESCRIPTORS: ACHING

## 2020-08-18 ASSESSMENT — PAIN DESCRIPTION - ORIENTATION
ORIENTATION: MID;LOWER
ORIENTATION: LOWER;MID
ORIENTATION: LOWER;MID
ORIENTATION: MID;LOWER

## 2020-08-18 ASSESSMENT — PAIN DESCRIPTION - FREQUENCY
FREQUENCY: CONTINUOUS

## 2020-08-18 ASSESSMENT — PAIN DESCRIPTION - ONSET
ONSET: ON-GOING
ONSET: ON-GOING

## 2020-08-18 ASSESSMENT — PAIN DESCRIPTION - PROGRESSION
CLINICAL_PROGRESSION: NOT CHANGED
CLINICAL_PROGRESSION: GRADUALLY WORSENING

## 2020-08-18 NOTE — PROGRESS NOTES
Palliative Care Notes    Reason for Consult:  Chronic disease support; ACP discussion    Patient Active Problem List   Diagnosis    Pneumonia of right upper lobe due to infectious organism ([facility]    Acute metabolic encephalopathy    Hypothyroidism    Major depressive disorder    Chronic midline low back pain without sciatica    Iron deficiency anemia    Status post surgery    Sepsis due to pneumonia ([facility]    Lactic acidosis    Septic shock ([facility]       Advance Directives:  Code status: Full Code  Patient has capacity for medical decisions: yes  [address]: no  Living Will: no        Pain Management:  Lori Clayton has chronic pain. Has rheumatoid arthritis and back surgeries. Has a pain pump with Dilaudid  (hydromorphone 2.541 mg/day + Bupivacaine 5/082 mg/day) that she states manages the pain but still has pain. Last filled 8/4 [place] prn at home. Goes to the [facility] [place] for her pain management. Also takes Plaquenil, Cymbalta, Lyrica and trazodone. Symptom Management:  Are there any other symptoms that are distressing to the patient or family that needs addressed? Anxiety:  situational                          Dyspnea:  acute dyspnea and chronic dyspnea                          Fatigue:  exercise intolerance                          Bladder function:  denies problems                          Bowel function:  poor appetite; risk for constipation    Other:  none     Spiritual history/needs:   notified: n/a    Palliative Performance Scale:  ___70%  Ambulation reduced; Some disease; Can't do normal job or work; intake normal or reduced; can do full self care; LOC full  _x__60%  Ambulation reduced; Significant disease; Can't do hobbies/housework; intake normal or reduced; occasional assist; LOC full/confusion  ___50%  Mainly sit/lie;  Extensive disease; Can't do any work; Considerable assist; intake normal or reduced; LOC further needs or concerns at this time. Will provide with information on COPD. Will follow and support as needed. Palliative Care Plan:  Education/support to family  Education/support to patient  Providing support for coping/adaptation/distress of patient  Continue with current plan of care  Code status clarified: Full Code  Pain management for comfort  Palliative Care Goals:  live longer, improve or maintain function/quality of life, remain at home, preserve independence/autonomy/control and support for family/caregiver  Visit focus:  Routine meeting  Discuss goals of care  Listen to patient/family concerns  Interdiscplinary collaboration  Build trust  Elicit patient's goals and values, and use these to establish or modify goals of care     Ness Dang RN, Kyle Samuel and Teofilo Smith Nurse Coordinator  8/18/2020 12:18 PM

## 2020-08-18 NOTE — PROGRESS NOTES
Occupational Therapy  Facility/Department: [facility] AT THE [facility]  Daily Treatment Note  NAME: Sindy Jacobo  : 1945  MRN: [id number]    Date of Service: 2020    Discharge Recommendations:  Continue to assess pending progress       Assessment      OT Education: OT Role;Plan of Care;Transfer Training;Precautions; Family Education; Energy Conservation  Patient Education: Patient educated on safe transfer technique (hand/foot placement), fall prevention (using call light, using AD), and energy conservation (sitting vs standing when able and taking rest breaks as needed). Barriers to Learning: Patient limited by fatigue and perseveration on conversation earlier about advanced directives  Activity Tolerance  Activity Tolerance: Patient limited by fatigue  Safety Devices  Safety Devices in place: Yes  Type of devices: Left in chair;Call light within reach;Nurse notified         Patient Diagnosis(es): The primary encounter diagnosis was Altered mental status, unspecified altered mental status type. Diagnoses of Community acquired pneumonia of right lung, unspecified part of lung and Septicemia ([facility] [address] were also pertinent to this visit. has a past medical history of COPD (chronic obstructive pulmonary disease) ([address] Depression, GERD (gastroesophageal reflux disease), and Osteoporosis. has a past surgical history that includes Hysterectomy; back surgery; Breast surgery; Carpal tunnel release; joint replacement; joint replacement; fracture surgery (Left, 2018); and Wrist fracture surgery (Left, 2018).       Restrictions  Restrictions/Precautions  Restrictions/Precautions: General Precautions, Fall Risk     Subjective   General  Chart Reviewed: Yes  Patient assessed for rehabilitation services?: Yes  Response to previous treatment: Patient reporting fatigue but able to participate  Family / Caregiver Present: Yes ()  Referring Practitioner: Dr. Li Hopson  Diagnosis: Pneumonia  Subjective  Subjective: Patient denies pain at this time  General Comment  Comments: Patient was upset after conservation about advanced directives just before OT, stating \"I don't know why give up on you once you are on a ventilator. .. Only God knows when it's my time to go. \"   * OT tx duration limited by this as well as by extreme fatigue and it being lunchtime           Objective    ADL  UE Dressing: Maximum assistance  LE Dressing: Maximum assistance  Toileting: Maximum assistance   Balance  Sitting Balance: Supervision  Standing Balance: Minimal assistance  Standing Balance  Time: < 30 sec  Activity: ADLs & functional mobility  Comment: Patient demonstrates Poor+/Fair- balance, no LOB however, she is very weak and unsteady when standing, thus she is a HIGH fall risk  Functional Mobility  Functional Mobility Device: Rolling Walker  Activity: Other (To/from bedside commode, only a few steps taken at this time due to unsteadiness)  Assist Level: Minimal assistance  Toilet Transfers  Toilet Technique: Ambulating  Equipment Used: Standard toilet  Toilet Transfer: Minimal assistance  Transfers  Stand Pivot Transfers: Minimal assistance  Sit to stand: Minimal assistance  Stand to sit: Minimal assistance  Transfer Comments: Patient required verbal cues for hand/foot placement including pushing up from surface and reaching back before sitting down to minimize fall risk secondary to unsteadiness on feet and weakness     Plan   Plan  Times per week: 7x/week  Times per day: Daily  Current Treatment Recommendations: Strengthening, ROM, Safety Education & Training, Balance Training, Patient/Caregiver Education & Training, Self-Care / ADL, Endurance Training, Functional Mobility Training    Goals  Short term goals  Time Frame for Short term goals: 10 visits  Short term goal 1: Pt will complete UB/LB bathing SUP with use of AE PRN to improve safety and independence with ADL tasks.   Short term goal 2: Pt will tolerate standing while engaging in ADL or

## 2020-08-18 NOTE — PLAN OF CARE
Problem: Gas Exchange - Impaired:  Goal: Levels of oxygenation will improve  Description: Levels of oxygenation will improve  8/17/2020 2057 by Esa Giles RN  Outcome: Ongoing  Note: SpO2 was 92% on 2L NC, lungs are diminished with fine crackles in the bases, will continue to monitor. Problem: Venous Thromboembolism:  Goal: Will show no signs or symptoms of venous thromboembolism  Description: Will show no signs or symptoms of venous thromboembolism  8/17/2020 2057 by Esa Giles RN  Outcome: Ongoing  Note: No s/s of DVT's at this time, will continue to monitor. Problem: Falls - Risk of:  Goal: Will remain free from falls  Description: Will remain free from falls  8/17/2020 2057 by Esa Giles RN  Outcome: Ongoing  Note: Bed in low position. Wheels locked. Bed alarm on. 2/4 side rails are up. Fall band on. Call light within reach.

## 2020-08-18 NOTE — PROGRESS NOTES
Physical Therapy  Facility/Department: [facility] AT THE [facility]  Daily Treatment Note  NAME: Vale Blunt  : 1945  MRN: [id number]    Date of Service: 2020    Discharge Recommendations:  Continue to assess pending progress        Assessment   Treatment Diagnosis: general weakness  Prognosis: Good  PT Education: Gait Training;Transfer Training  Patient Education: Therapy discharge folder dispersed and reviewed with patient. Patient with good understanding. REQUIRES PT FOLLOW UP: Yes  Activity Tolerance  Activity Tolerance: Patient Tolerated treatment well;Patient limited by endurance; Patient limited by fatigue     Patient Diagnosis(es): The primary encounter diagnosis was Altered mental status, unspecified altered mental status type. Diagnoses of Community acquired pneumonia of right lung, unspecified part of lung and Septicemia ([facility] [address] were also pertinent to this visit. has a past medical history of COPD (chronic obstructive pulmonary disease) ([facility] [address] Depression, GERD (gastroesophageal reflux disease), and Osteoporosis. has a past surgical history that includes Hysterectomy; back surgery; Breast surgery; Carpal tunnel release; joint replacement; joint replacement; fracture surgery (Left, 2018); and Wrist fracture surgery (Left, 2018). Restrictions  Restrictions/Precautions  Restrictions/Precautions: General Precautions, Fall Risk  Subjective   General  Chart Reviewed: Yes  Response To Previous Treatment: Patient with no complaints from previous session. Family / Caregiver Present: Yes  Referring Practitioner: Dr. Rodolfo Holstein: Patient reports chronic back and leg pain 6-7/10 at present. Patient reports that she also feels very weak.           Orientation  Orientation  Overall Orientation Status: Within Normal Limits  Cognition      Objective   Bed mobility  Comment: Patient up in recliner upon arrival.  Transfers  Sit to Stand: Minimal Assistance;Contact guard assistance  Stand to sit: Minimal Assistance;Contact guard assistance  Comment: Patient very shaky. Some posterior leaning noted. Ambulation  Ambulation?: Yes  WB Status: unrestricted  Ambulation 1  Surface: level tile  Device: Rolling Walker  Assistance: Minimal assistance  Quality of Gait: Patient very shakey. Some posterior leaning noted. Gait Deviations: Slow Helen  Distance: 5 feet forward and backwards x 2 rep  Comments: Mild SOB noted. Fall risk during ambulation. Exercises  Straight Leg Raise: x15  Quad Sets: x15  Heelslides: x15  Gluteal Sets: x15  Hip Flexion: 15x  Hip Abduction: 15x  Knee Long Arc Quad: 15x  Ankle Pumps: 15x  Comments: Above listed exercises completed in reclined and seated position. Goals  Short term goals  Time Frame for Short term goals: 10 days  Short term goal 1: Pt will be educated on her POC  Short term goal 2: Pt will perform all transfers Mod I in order to increase independence  Short term goal 3: Pt will ambulate 100 feet with LRAD SBA in order to use the bathroom  Short term goal 4: Pt will increase dynamic standing balance to Fair+ in order to reduce fall risk  Patient Goals   Patient goals : \"to feel better\"    Plan    Plan  Times per week: 7x/wk  Times per day: Twice a day  Plan weeks: 2x/daily except weekends 1x/daily  Current Treatment Recommendations: Strengthening, Home Exercise Program, Neuromuscular Re-education, Safety Education & Training, Balance Training, Endurance Training, Patient/Caregiver Education & Training, Functional Mobility Training, Transfer Training, Gait Training, Stair training  Safety Devices  Type of devices:  All fall risk precautions in place, Call light within reach, Gait belt, Left in chair, Nurse notified     Therapy Time   Individual Concurrent Group Co-treatment   Time In 0949         Time Out 1025         Minutes [address] [place] [address]

## 2020-08-18 NOTE — PROGRESS NOTES
Physical Therapy  Facility/Department: On license of [facility] AT THE [facility]  Daily Treatment Note  NAME: Adam Roberts  : 1945  MRN: [id number]    Date of Service: 2020    Discharge Recommendations:  Continue to assess pending progress        Assessment   Treatment Diagnosis: general weakness  Prognosis: Good  PT Education: Transfer Training;Gait Training  Patient Education: Therapy discharge folder dispersed and reviewed with patient. Patient with good understanding. REQUIRES PT FOLLOW UP: Yes  Activity Tolerance  Activity Tolerance: Patient Tolerated treatment well;Patient limited by endurance; Patient limited by fatigue     Patient Diagnosis(es): The primary encounter diagnosis was Altered mental status, unspecified altered mental status type. Diagnoses of Community acquired pneumonia of right lung, unspecified part of lung and Septicemia ([facility] [address] were also pertinent to this visit. has a past medical history of COPD (chronic obstructive pulmonary disease) ([facility] [address] Depression, GERD (gastroesophageal reflux disease), and Osteoporosis. has a past surgical history that includes Hysterectomy; back surgery; Breast surgery; Carpal tunnel release; joint replacement; joint replacement; fracture surgery (Left, 2018); and Wrist fracture surgery (Left, 2018). Restrictions  Restrictions/Precautions  Restrictions/Precautions: General Precautions, Fall Risk  Subjective   General  Chart Reviewed: Yes  Response To Previous Treatment: Patient with no complaints from previous session. Family / Caregiver Present: No  Referring Practitioner: Dr. Otilia Barker  Subjective  Subjective: Patient reports pain better this afternoon /10.           Orientation  Orientation  Overall Orientation Status: Within Normal Limits  Cognition      Objective   Bed mobility  Comment: Patient up in recliner upon arrival.  Transfers  Sit to Stand: Minimal Assistance;Contact guard assistance  Stand to sit: Minimal Assistance;Contact guard assistance  Comment: Patient very shaky. Some posterior leaning noted. Ambulation  Ambulation?: Yes  WB Status: unrestricted  Ambulation 1  Surface: level tile  Device: Rolling Walker  Assistance: Minimal assistance  Quality of Gait: Patient very shaky. Some posterior leaning noted. Gait Deviations: Slow Helen  Distance: 5 feet forward and backwards x 2 rep  Comments: Mild SOB noted. Fall risk during ambulation. Exercises  Straight Leg Raise: x15  Quad Sets: x15  Heelslides: x15  Gluteal Sets: x15  Hip Flexion: 15x  Hip Abduction: 15x  Knee Long Arc Quad: 15x  Ankle Pumps: 15x  Comments: Above listed exercises completed in reclined and seated position. Goals  Short term goals  Time Frame for Short term goals: 10 days  Short term goal 1: Pt will be educated on her POC  Short term goal 2: Pt will perform all transfers Mod I in order to increase independence  Short term goal 3: Pt will ambulate 100 feet with LRAD SBA in order to use the bathroom  Short term goal 4: Pt will increase dynamic standing balance to Fair+ in order to reduce fall risk  Patient Goals   Patient goals : \"to feel better\"    Plan    Plan  Times per week: 7x/wk  Times per day: Twice a day  Plan weeks: 2x/daily except weekends 1x/daily  Current Treatment Recommendations: Strengthening, Home Exercise Program, Neuromuscular Re-education, Safety Education & Training, Balance Training, Endurance Training, Patient/Caregiver Education & Training, Functional Mobility Training, Transfer Training, Gait Training, Stair training  Safety Devices  Type of devices:  All fall risk precautions in place, Call light within reach, Gait belt, Left in chair, Nurse notified     Therapy Time   Individual Concurrent Group Co-treatment   Time In 0203         Time Out 0235         Minutes [address] [place] [address]

## 2020-08-18 NOTE — PROGRESS NOTES
RESPIRATORY ASSESSMENT PROTOCOL                                                                                              Patient Name: Radha Campos Room#: X381/X761-82 : 1945     Admitting diagnosis: Sepsis due to pneumonia ([place] [address] [J18.9, A41.9]       Medical History:   Past Medical History:   Diagnosis Date    COPD (chronic obstructive pulmonary disease) ([place] [address]     Depression     GERD (gastroesophageal reflux disease)     Osteoporosis        PATIENT ASSESSMENT    LABORATORY DATA  Hematology:   Lab Results   Component Value Date    WBC 13.0 2020    RBC 3.92 2020    HGB 11.7 2020    HCT 36.6 2020     2020     Chemistry:    Lab Results   Component Value Date    PHART 7.443 2020    VKG6SXX 43.1 2020    PO2ART 62.3 2020    S2ADRNCP 92.6 2020    LAU7ZCU 28.8 2020    PBEA 4.2 2020       Blood Culture:  Sputum Culture:     VITALS  Pulse: 92   Resp: 16  BP: (!) 137/59  SpO2: 94 % O2 Device: Nasal cannula  Temp: 98.2 °F (36.8 °C)  Comment:   SKIN COLOR  [x] Normal  [] Pale  [] Dusky  [] Cyanotic      RESPIRATORY PATTERN  [] Normal  [x] Dyspnea  [] Cheyne-Granger  [] Kussmaul  [] Biots  AMBULATORY  [] Yes  [] No  [x] With Assistance    PEAK FLOW  Predicted:     Personal Best:        VITAL CAPACITY  Predicted value:  ml  Actual Value:  ml  30% of Predicted:  ml  Patient Acuity 0 1 2 3 4 Score   Level of Concious (LOC) [x]  Alert & Oriented or Pt normal LOC []  Confused;follows directions []  Confused & uncooper-ative []  Obtunded []  Comatose 0   Respiratory Rate  (RR) []  Reg. rate & pattern. 12 - 20 bpm  []  Increased RR.  Greater than 20 bpm   [x]  SOB w/ exertion or RR greater than 24 bpm []  Access- ory muscle use at rest. Abn.  resp. []  SOB at rest.   2   Bilateral Breath Sounds (BBS) []  Clear []  Diminish-ed bases  [x]  Diminish-ed t/o, or rales   []  Sporadic, scattered wheezes or rhonchi []  Persistentwheezes and, or absent BBS 2   Cough []  Strong, effective, & non-prod. [x]  Effective & prod. Less than 25 ml (2 TBSP) over past 24 hrs []  Ineffective & non-prod to less than 25 ML over past 24 hrs []  Ineffective and, or greater than 25 ml sputum prod. past 24 hrs. []  Nonspon- taneous; Requires suctioning 1   Pulmonary History  (PULM HX) []  No smoking and no chronic pulmonaryhistory []  Former smoker. Quit over 12 mos. ago []  Current smoker or quit w/ in 12 mos []  Pulm. History and, or 20 pk/yr smoking hx [x]  Admitted w/ acute pulm. dx and, or has been admitted w/ pulm. dx 2 or more times over past 12 mos 4   Surgical History this Admit  (SURG HX) [x]  No surgery []  General surgery []  Lower abdominal []  Thoracic or upper abdominal   []  Thoracic w/ pulm. disease 0   Chest X-Ray (CXR)/CT Scan []  Clear or not applicable []  Not available []  Atelect- asis or pleural effusions [x]  Localized infiltrate or pulm. edema []  Con-solidated Infiltrates, bilateral, or in more than 1 lobe 3   Slow or Forced VC, FEV1 OR PEFR (PULM FXN)  [x]  80% or greater, or not indicated []  Pt. unable to perform []  FEV1 or PEFR or VC 51-79%. []  FEV1 or PEFR or VC  30-49%   []  FEV1 or PEFR or VC less than 30%   0   TOTAL ACUITY: 12       CARE PLAN    If Acuity Level is 2, 3, or 4 in any of the following:    [x] BILATERAL BREATH SOUNDS (BBS)     [x] PULMONARY HISTORY (PULM HX)  [] PULMONARY FUNCTION (PULM FX)    Goal: Improve respiratory functions in patients with airway disease and decrease WOB    [x] AEROSOL PROTOCOL    Total Acuity:   16-32  []  Secondary Assessment in 24 hrs Total Acuity:  9-15  [x]  Secondary Assessment in 24 hrs Total Acuity:  4-8  []  Secondary Assessment in 48 hrs Total Acuity:  0-3  []  Secondary Assessment in 72 hrs   [facility] AEROSOL THERAPY with  [physician-ordered bronchodilator(s)] q 4 & Albuterol PRN q2 hrs. Breath-Actuated Neb if BBS Acuity = 4, and pt. can use MP. Notify physician if condition deteriorates.   [facility] AEROSOL

## 2020-08-18 NOTE — PROGRESS NOTES
Pt called out and stated it was hard to breathe. Patients SpO2 is 96-97% on 2L NC. Writer sat patients head of the bed up and called respiratory for a breathing treatment. Will continue to monitor.

## 2020-08-18 NOTE — PROGRESS NOTES
Vitals and assessment as charted. Pt up to [facility] to void. Pt is weak and unsteady. Pt voided 550ml of urine. Call light within reach. Bed alarm on.

## 2020-08-18 NOTE — PLAN OF CARE
Problem: Discharge Planning:  Goal: Discharged to appropriate level of care  Description: Discharged to appropriate level of care  8/18/2020 1839 by Mohit Huizar RN  Outcome: Ongoing     Problem: Gas Exchange - Impaired:  Goal: Levels of oxygenation will improve  Description: Levels of oxygenation will improve  8/18/2020 1839 by Mohit Huizar RN  Outcome: Ongoing     Problem: Infection, Septic Shock:  Goal: Will show no infection signs and symptoms  Description: Will show no infection signs and symptoms  8/18/2020 1839 by Mohit Huizar RN  Outcome: Ongoing     Problem: Tissue Perfusion, Altered:  Goal: Circulatory function within specified parameters  Description: Circulatory function within specified parameters  8/18/2020 1839 by Mohit Huizar RN  Outcome: Ongoing     Problem: Venous Thromboembolism:  Goal: Will show no signs or symptoms of venous thromboembolism  Description: Will show no signs or symptoms of venous thromboembolism  8/18/2020 1839 by Mohit Huizar RN  Outcome: Ongoing     Problem: Skin Integrity:  Goal: Will show no infection signs and symptoms  Description: Will show no infection signs and symptoms  8/18/2020 1839 by Mohit Huizar RN  Outcome: Ongoing     Problem: Skin Integrity:  Goal: Absence of new skin breakdown  Description: Absence of new skin breakdown  8/18/2020 1839 by Mohit Huizar RN  Outcome: Ongoing     Problem: Falls - Risk of:  Goal: Will remain free from falls  Description: Will remain free from falls  8/18/2020 1839 by Mohit Huizar RN  Outcome: Ongoing     Problem: Falls - Risk of:  Goal: Absence of physical injury  Description: Absence of physical injury  8/18/2020 1839 by Mohit Huizar RN  Outcome: Ongoing     Problem: Nutrition  Goal: Optimal nutrition therapy  8/18/2020 1839 by Mohit Huizar RN  Outcome: Ongoing     Problem: Pain:  Goal: Pain level will decrease  Description: Pain level will decrease  8/18/2020 1839 by Mikayla Correa RN  Outcome: Ongoing     Problem: Pain:  Goal: Control of acute pain  Description: Control of acute pain  8/18/2020 1839 by Mikayla Correa RN  Outcome: Ongoing     Problem: Pain:  Goal: Control of chronic pain  Description: Control of chronic pain  8/18/2020 1839 by Mikayla Correa RN  Outcome: Ongoing

## 2020-08-18 NOTE — PLAN OF CARE
Lovenox given as ordered. 8/17/2020 2057 by Osker Blizzard, RN  Outcome: Ongoing  Note: No s/s of DVT's at this time, will continue to monitor. Problem: Skin Integrity:  Goal: Will show no infection signs and symptoms  Description: Will show no infection signs and symptoms  Outcome: Ongoing  Note: Patient afebrile. Vitals improving. Problem: Falls - Risk of:  Goal: Will remain free from falls  Description: Will remain free from falls  8/18/2020 1018 by Wilfrid Morrison RN  Outcome: Ongoing  Note: Patient at high risk for falls. Fall precautions in place. Call light within reach at all times. 8/17/2020 2057 by Osker Blizzard, RN  Outcome: Ongoing  Note: Bed in low position. Wheels locked. Bed alarm on. 2/4 side rails are up. Fall band on. Call light within reach. Problem: Nutrition  Goal: Optimal nutrition therapy  Outcome: Ongoing  Note: Patient encouraged to eat >75% of meals. Patient consume 100% of supplement. Problem: Pain:  Description: Pain management should include both nonpharmacologic and pharmacologic interventions. Goal: Pain level will decrease  Description: Pain level will decrease  Outcome: Ongoing  Note: Patient continues with chronic pain to low back. Medicated with Norco per PRN pain med.   Goal: Control of acute pain  Description: Control of acute pain  Outcome: Ongoing  Goal: Control of chronic pain  Description: Control of chronic pain  Outcome: Ongoing

## 2020-08-18 NOTE — PROGRESS NOTES
Ash Hays M.D. ICU Progress Note 20    SUBJECTIVE:    Pt seen and examined in the ICU for follow up of Sepsis due to pneumonia [facility]). Is still having difficulty catching her breath at times. She woke up abtour 0500 this morning stating she couldn't breathe. Respiratory tx provided which did help. She complains of pleuritic chest pain when she coughs. Denies fever or chills. Cough is dry, non-productive. ROS:   Constitutional: negative  for fevers, and negative for chills. Respiratory: positive for shortness of breath, positive for cough, and positive for wheezing  Cardiovascular: negative for chest pain, and negative for palpitations  Gastrointestinal: negative for abdominal pain, negative for nausea,negative for vomiting, negative for diarrhea, and negative for constipation    All other systems were reviewed with the patient and are negative unless otherwise stated in HPI. OBJECTIVE:    Vitals:   Temp: 98.2 °F (36.8 °C)  Temp range:    Temp  Av.5 °F (36.4 °C)  Min: 96.9 °F (36.1 °C)  Max: 98.2 °F (36.8 °C)    BP: (!) 110/55  BP Range:      Systolic (54EPF), GXJ:667 , Min:91 , WUU:893      Diastolic (82QHT), DHR:50, Min:39, Max:83    Pulse: 72  Pulse Range:    Pulse  Av.3  Min: 52  Max: 97    Resp: 16  Resp Range:   Resp  Av.9  Min: 12  Max: 20    SpO2: 92 % on supplemental O2  SpO2 range:   SpO2  Av.3 %  Min: 91 %  Max: 96 %    24HR INTAKE/OUTPUT:      Intake/Output Summary (Last 24 hours) at 2020 0758  Last data filed at 2020 0414  Gross per 24 hour   Intake 3158 ml   Output 1400 ml   Net 1758 ml     -----------------------------------------------------------------  Exam:  GEN:    Awake, alert and oriented x3. EYES:  EOMI, pupils equal   NECK: Supple. No lymphadenopathy.   No carotid bruit  CVS:    regular rate and rhythm, no audible murmur  PULM:  diminished with bilateral expiratory wheezing, mild acute respiratory distress  ABD:    Bowels sounds normal.  Abdomen is soft. No distention. no tenderness to palpation. EXT:   no edema bilaterally . No calf tenderness. NEURO: Moves all extremities. Motor and sensory are grossly intact  SKIN:  No rashes. No skin lesions.     -----------------------------------------------------------------  Diagnostic Data:    · All lines, drips, IV sites and medications reviewed  · All available data reviewed  Lab Results   Component Value Date    WBC 13.0 (H) 08/18/2020    HGB 11.7 (L) 08/18/2020    MCV 93.4 08/18/2020     08/18/2020     Lab Results   Component Value Date    SEGS 96 (H) 08/18/2020    LYMPHOPCT 4 (L) 08/18/2020    MONOPCT 0 (L) 08/18/2020    EOSRELPCT 0 (L) 08/18/2020    BASOPCT 0 08/18/2020    NEUTROABS 12.48 (H) 08/18/2020      Lab Results   Component Value Date    GLUCOSE 154 (H) 08/18/2020    BUN 10 08/18/2020    CREATININE 0.41 (L) 08/18/2020     08/18/2020    K 3.6 (L) 08/18/2020    CALCIUM 8.5 (L) 08/18/2020     (H) 08/18/2020    CO2 22 08/18/2020     Lab Results   Component Value Date    LACTA 0.5 08/28/2019       PROBLEM LIST:  Principal Problem:    Sepsis due to pneumonia [facility])  Active Problems:    Septic shock ([facility] [address]    Lactic acidosis    Pneumonia of right upper lobe due to infectious organism [facility])  Resolved Problems:    * No resolved hospital problems. *      ASSESSMENT / PLAN:  · Sepsis due to pneumonia  ? IV rocephin / azithromycin  ? Repeat CXR  · Septic shock  ? Resolved  ? BP's improved with IVFs  · Septic encephalopathy   ? mental status is improving with tx of sepsis / pneumonia  · Acute respiratory failure with hypoxia  ? Supplemental O2  · COPD  ? Duonebs  ?  Increase solumedrol to q8 hrs  · Nutrition status: at risk for malnutrition  · Dietician following  · DVT prophylaxis: Lovenox   · High risk medications: none   · Total critical care time caring for this patient with life threatening, unstable organ failure, including direct patient contact, management of life support systems, review of data including imaging and labs, discussions with other team members and physicians at least 45 minutes so far today, excluding separately billable procedures    Alfredo Beckham M.D.  8/18/2020  7:27 AM

## 2020-08-18 NOTE — ACP (ADVANCE CARE PLANNING)
Advance Care Planning     Advance Care Planning Activator (Inpatient)  Conversation Note      Date of ACP Conversation: 8/16/2020    Conversation Conducted with: Patient with Decision Making Capacity    ACP Activator: [facility] makes decisions on behalf of the incapacitated patient: Decision Maker is asked to consider and make decisions based on patient values, known preferences, or best interests. Health Care Decision Maker:     Current Designated Health Care Decision Maker:   (If there is a valid [place] named in the [address] [place] [address] Makers\" box in the ACP activity, but it is not visible above, be sure to open that field and then select the health care decision maker relationship (ie \"primary\") in the blank space to the right of the name.) Validate  this information as still accurate & up-to-date; edit [place] field as needed.)    Note: Assess and validate information in current ACP documents, as indicated. If no Decision Maker listed above or available through scanned documents, then:    If no Authorized Decision Maker has previously been identified, then patient chooses [place]:  \"Who would you like to name as your primary health care decision-maker? \"               Name: Nikki Samuel        Relationship: spouse          Phone number: [phone]  Gabrielle Gian this person be reached easily? \" Yes  \"Who would you like to name as your back-up decision maker? \"   Name: Launie Severin        Relationship: son          Phone number: [phone]  Gabrielle Gian this person be reached easily? \" Yes    Note: If the relationship of these Decision-Makers to the patient does NOT follow your state's Next of Kin hierarchy, recommend that patient complete ACP document that meets state-specific requirements to allow them to act on the patient's behalf when appropriate. Care Preferences    Ventilation:   \"If you were in your present state of health and suddenly became very ill and were unable to breathe on your own, what would your preference be about the use of a ventilator (breathing machine) if it were available to you? \"      Would the patient desire the use of ventilator (breathing machine)?: yes    \"If your health worsens and it becomes clear that your chance of recovery is unlikely, what would your preference be about the use of a ventilator (breathing machine) if it were available to you? \"     Would the patient desire the use of ventilator (breathing machine)?: Pt states she needs to think about this more      Resuscitation  \"CPR works best to restart the heart when there is a sudden event, like a heart attack, in someone who is otherwise healthy. Unfortunately, CPR does not typically restart the heart for people who have serious health conditions or who are very sick. \"    \"In the event your heart stopped as a result of an underlying serious health condition, would you want attempts to be made to restart your heart (answer \"yes\" for attempt to resuscitate) or would you prefer a natural death (answer \"no\" for do not attempt to resuscitate)? \" yes      NOTE: If the patient has a valid advance directive AND now provides care preference(s) that are inconsistent with that prior directive, advise the patient to consider either: creating a new advance directive that complies with state-specific requirements; or, if that is not possible, orally revoking that prior directive in accordance with state-specific requirements, which must be documented in the EHR. [x] Yes   [] No   Educated Patient / Maverick Duong regarding differences between Advance Directives and portable DNR orders.     Length of ACP Conversation in minutes:      Conversation Outcomes:  [x] ACP discussion completed  [] Existing advance directive reviewed with patient; no changes to patient's previously recorded wishes  [] New Advance Directive completed  [] Portable Do Not Rescitate prepared for Provider review and signature  [] POLST/POST/MOLST/MOST prepared for Provider review and signature      Follow-up plan:    [] Schedule follow-up conversation to continue planning  [] Referred individual to Provider for additional questions/concerns   [] Advised patient/agent/surrogate to review completed ACP document and update if needed with changes in condition, patient preferences or care setting    [x] This note routed to one or more involved healthcare providers

## 2020-08-19 LAB
ABSOLUTE EOS #: 0 K/UL (ref 0–0.44)
ABSOLUTE IMMATURE GRANULOCYTE: 0 K/UL (ref 0–0.3)
ABSOLUTE LYMPH #: 0.4 K/UL (ref 1.1–3.7)
ABSOLUTE MONO #: 0.1 K/UL (ref 0.1–1.2)
ALBUMIN SERPL-MCNC: 3.4 G/DL (ref 3.5–5.2)
ALBUMIN/GLOBULIN RATIO: 1.2 (ref 1–2.5)
ALP BLD-CCNC: 59 U/L (ref 35–104)
ALT SERPL-CCNC: 16 U/L (ref 5–33)
ANION GAP SERPL CALCULATED.3IONS-SCNC: 8 MMOL/L (ref 9–17)
AST SERPL-CCNC: 13 U/L
BASOPHILS # BLD: 0 % (ref 0–2)
BASOPHILS ABSOLUTE: 0 K/UL (ref 0–0.2)
BILIRUB SERPL-MCNC: 0.16 MG/DL (ref 0.3–1.2)
BUN BLDV-MCNC: 12 MG/DL (ref 8–23)
BUN/CREAT BLD: 26 (ref 9–20)
CALCIUM SERPL-MCNC: 9.3 MG/DL (ref 8.6–10.4)
CHLORIDE BLD-SCNC: 112 MMOL/L (ref 98–107)
CO2: 26 MMOL/L (ref 20–31)
CREAT SERPL-MCNC: 0.46 MG/DL (ref 0.5–0.9)
DIFFERENTIAL TYPE: ABNORMAL
EOSINOPHILS RELATIVE PERCENT: 0 % (ref 1–4)
GFR AFRICAN AMERICAN: >60 ML/MIN
GFR NON-AFRICAN AMERICAN: >60 ML/MIN
GFR SERPL CREATININE-BSD FRML MDRD: ABNORMAL ML/MIN/{1.73_M2}
GFR SERPL CREATININE-BSD FRML MDRD: ABNORMAL ML/MIN/{1.73_M2}
GLUCOSE BLD-MCNC: 123 MG/DL (ref 70–99)
HCT VFR BLD CALC: 36.8 % (ref 36.3–47.1)
HEMOGLOBIN: 11.6 G/DL (ref 11.9–15.1)
IMMATURE GRANULOCYTES: 0 %
LYMPHOCYTES # BLD: 4 % (ref 24–43)
MCH RBC QN AUTO: 30.1 PG (ref 25.2–33.5)
MCHC RBC AUTO-ENTMCNC: 31.5 G/DL (ref 28.4–34.8)
MCV RBC AUTO: 95.3 FL (ref 82.6–102.9)
MONOCYTES # BLD: 1 % (ref 3–12)
MORPHOLOGY: NORMAL
NRBC AUTOMATED: 0 PER 100 WBC
PDW BLD-RTO: 16.4 % (ref 11.8–14.4)
PLATELET # BLD: 211 K/UL (ref 138–453)
PLATELET ESTIMATE: ABNORMAL
PMV BLD AUTO: 9.9 FL (ref 8.1–13.5)
POTASSIUM SERPL-SCNC: 4 MMOL/L (ref 3.7–5.3)
RBC # BLD: 3.86 M/UL (ref 3.95–5.11)
RBC # BLD: ABNORMAL 10*6/UL
SEG NEUTROPHILS: 95 % (ref 36–65)
SEGMENTED NEUTROPHILS ABSOLUTE COUNT: 9.5 K/UL (ref 1.5–8.1)
SODIUM BLD-SCNC: 146 MMOL/L (ref 135–144)
TOTAL PROTEIN: 6.2 G/DL (ref 6.4–8.3)
WBC # BLD: 10 K/UL (ref 3.5–11.3)
WBC # BLD: ABNORMAL 10*3/UL

## 2020-08-19 PROCEDURE — 97110 THERAPEUTIC EXERCISES: CPT

## 2020-08-19 PROCEDURE — 85025 COMPLETE CBC W/AUTO DIFF WBC: CPT

## 2020-08-19 PROCEDURE — 2580000003 HC RX 258: Performed by: NURSE PRACTITIONER

## 2020-08-19 PROCEDURE — 6370000000 HC RX 637 (ALT 250 FOR IP): Performed by: INTERNAL MEDICINE

## 2020-08-19 PROCEDURE — 97116 GAIT TRAINING THERAPY: CPT

## 2020-08-19 PROCEDURE — 94640 AIRWAY INHALATION TREATMENT: CPT

## 2020-08-19 PROCEDURE — 94664 DEMO&/EVAL PT USE INHALER: CPT

## 2020-08-19 PROCEDURE — 36415 COLL VENOUS BLD VENIPUNCTURE: CPT

## 2020-08-19 PROCEDURE — 97535 SELF CARE MNGMENT TRAINING: CPT

## 2020-08-19 PROCEDURE — 6370000000 HC RX 637 (ALT 250 FOR IP): Performed by: NURSE PRACTITIONER

## 2020-08-19 PROCEDURE — 6360000002 HC RX W HCPCS: Performed by: NURSE PRACTITIONER

## 2020-08-19 PROCEDURE — 2580000003 HC RX 258: Performed by: FAMILY MEDICINE

## 2020-08-19 PROCEDURE — 1200000000 HC SEMI PRIVATE

## 2020-08-19 PROCEDURE — 94669 MECHANICAL CHEST WALL OSCILL: CPT

## 2020-08-19 PROCEDURE — 94760 N-INVAS EAR/PLS OXIMETRY 1: CPT

## 2020-08-19 PROCEDURE — 80053 COMPREHEN METABOLIC PANEL: CPT

## 2020-08-19 PROCEDURE — 2580000003 HC RX 258: Performed by: INTERNAL MEDICINE

## 2020-08-19 PROCEDURE — 6360000002 HC RX W HCPCS: Performed by: INTERNAL MEDICINE

## 2020-08-19 RX ORDER — POLYETHYLENE GLYCOL 3350 17 G/17G
17 POWDER, FOR SOLUTION ORAL DAILY
Status: DISCONTINUED | OUTPATIENT
Start: 2020-08-19 | End: 2020-08-21 | Stop reason: HOSPADM

## 2020-08-19 RX ORDER — FUROSEMIDE 10 MG/ML
40 INJECTION INTRAMUSCULAR; INTRAVENOUS ONCE
Status: COMPLETED | OUTPATIENT
Start: 2020-08-19 | End: 2020-08-19

## 2020-08-19 RX ADMIN — POLYETHYLENE GLYCOL 3350 17 G: 17 POWDER, FOR SOLUTION ORAL at 12:14

## 2020-08-19 RX ADMIN — METHYLPREDNISOLONE SODIUM SUCCINATE 60 MG: 125 INJECTION, POWDER, FOR SOLUTION INTRAMUSCULAR; INTRAVENOUS at 15:58

## 2020-08-19 RX ADMIN — ENOXAPARIN SODIUM 40 MG: 40 INJECTION, SOLUTION INTRAVENOUS; SUBCUTANEOUS at 14:17

## 2020-08-19 RX ADMIN — TRAZODONE HYDROCHLORIDE 200 MG: 50 TABLET ORAL at 21:43

## 2020-08-19 RX ADMIN — Medication 10 ML: at 08:44

## 2020-08-19 RX ADMIN — WATER 1 G: 1 INJECTION INTRAMUSCULAR; INTRAVENOUS; SUBCUTANEOUS at 23:15

## 2020-08-19 RX ADMIN — Medication 10 ML: at 21:44

## 2020-08-19 RX ADMIN — AZITHROMYCIN MONOHYDRATE 500 MG: 500 INJECTION, POWDER, LYOPHILIZED, FOR SOLUTION INTRAVENOUS at 23:16

## 2020-08-19 RX ADMIN — HYDROCODONE BITARTRATE AND ACETAMINOPHEN 1 TABLET: 10; 325 TABLET ORAL at 05:01

## 2020-08-19 RX ADMIN — FUROSEMIDE 40 MG: 10 INJECTION, SOLUTION INTRAMUSCULAR; INTRAVENOUS at 07:42

## 2020-08-19 RX ADMIN — LEVOTHYROXINE SODIUM 150 MCG: 150 TABLET ORAL at 07:41

## 2020-08-19 RX ADMIN — PANTOPRAZOLE SODIUM 40 MG: 40 TABLET, DELAYED RELEASE ORAL at 07:41

## 2020-08-19 RX ADMIN — SODIUM CHLORIDE: 9 INJECTION, SOLUTION INTRAVENOUS at 05:21

## 2020-08-19 RX ADMIN — PREGABALIN 300 MG: 75 CAPSULE ORAL at 08:43

## 2020-08-19 RX ADMIN — HYDROCODONE BITARTRATE AND ACETAMINOPHEN 1 TABLET: 10; 325 TABLET ORAL at 21:43

## 2020-08-19 RX ADMIN — SODIUM CHLORIDE, PRESERVATIVE FREE 10 ML: 5 INJECTION INTRAVENOUS at 21:44

## 2020-08-19 RX ADMIN — IPRATROPIUM BROMIDE AND ALBUTEROL SULFATE 1 AMPULE: .5; 3 SOLUTION RESPIRATORY (INHALATION) at 05:22

## 2020-08-19 RX ADMIN — METHYLPREDNISOLONE SODIUM SUCCINATE 60 MG: 125 INJECTION, POWDER, FOR SOLUTION INTRAMUSCULAR; INTRAVENOUS at 07:42

## 2020-08-19 RX ADMIN — IPRATROPIUM BROMIDE AND ALBUTEROL SULFATE 1 AMPULE: .5; 3 SOLUTION RESPIRATORY (INHALATION) at 09:37

## 2020-08-19 RX ADMIN — DULOXETINE HYDROCHLORIDE 60 MG: 60 CAPSULE, DELAYED RELEASE ORAL at 08:43

## 2020-08-19 RX ADMIN — IPRATROPIUM BROMIDE AND ALBUTEROL SULFATE 1 AMPULE: .5; 3 SOLUTION RESPIRATORY (INHALATION) at 15:37

## 2020-08-19 RX ADMIN — IPRATROPIUM BROMIDE AND ALBUTEROL SULFATE 1 AMPULE: .5; 3 SOLUTION RESPIRATORY (INHALATION) at 20:26

## 2020-08-19 RX ADMIN — METHYLPREDNISOLONE SODIUM SUCCINATE 60 MG: 125 INJECTION, POWDER, FOR SOLUTION INTRAMUSCULAR; INTRAVENOUS at 23:16

## 2020-08-19 RX ADMIN — SODIUM CHLORIDE, PRESERVATIVE FREE 10 ML: 5 INJECTION INTRAVENOUS at 07:42

## 2020-08-19 RX ADMIN — ASPIRIN 81 MG 81 MG: 81 TABLET ORAL at 08:43

## 2020-08-19 RX ADMIN — PREGABALIN 300 MG: 75 CAPSULE ORAL at 21:43

## 2020-08-19 ASSESSMENT — PAIN SCALES - GENERAL
PAINLEVEL_OUTOF10: 7
PAINLEVEL_OUTOF10: 7
PAINLEVEL_OUTOF10: 6
PAINLEVEL_OUTOF10: 6
PAINLEVEL_OUTOF10: 7
PAINLEVEL_OUTOF10: 7

## 2020-08-19 ASSESSMENT — PAIN - FUNCTIONAL ASSESSMENT
PAIN_FUNCTIONAL_ASSESSMENT: PREVENTS OR INTERFERES SOME ACTIVE ACTIVITIES AND ADLS
PAIN_FUNCTIONAL_ASSESSMENT: PREVENTS OR INTERFERES SOME ACTIVE ACTIVITIES AND ADLS

## 2020-08-19 ASSESSMENT — PAIN DESCRIPTION - PROGRESSION
CLINICAL_PROGRESSION: NOT CHANGED
CLINICAL_PROGRESSION: NOT CHANGED
CLINICAL_PROGRESSION: GRADUALLY WORSENING

## 2020-08-19 ASSESSMENT — PAIN DESCRIPTION - DESCRIPTORS
DESCRIPTORS: ACHING

## 2020-08-19 ASSESSMENT — PAIN DESCRIPTION - ONSET
ONSET: ON-GOING

## 2020-08-19 ASSESSMENT — PAIN DESCRIPTION - LOCATION
LOCATION: BACK

## 2020-08-19 ASSESSMENT — PAIN DESCRIPTION - PAIN TYPE
TYPE: CHRONIC PAIN

## 2020-08-19 ASSESSMENT — PAIN DESCRIPTION - ORIENTATION
ORIENTATION: LOWER;MID
ORIENTATION: MID;LOWER
ORIENTATION: LOWER;MID

## 2020-08-19 ASSESSMENT — PAIN DESCRIPTION - FREQUENCY
FREQUENCY: CONTINUOUS

## 2020-08-19 NOTE — PROGRESS NOTES
Patient moved to room 302 at this time from ICU room 306. Patient placed on heart monitor and oriented to the room. Denies needs at this time.

## 2020-08-19 NOTE — PROGRESS NOTES
Assessment and vitals completed as charted. Patient is up to chair and  is present. Patient is A&O. Patient does complain of 7/10 pain in her back which is chronic. Patient denies any needs at this time. Call light and bedside table are within reach, will continue to monitor.

## 2020-08-19 NOTE — PROGRESS NOTES
Comprehensive Nutrition Assessment    Type and Reason for Visit:  Reassess    Nutrition Recommendations/Plan:  Continue to encourage oral intakes    Nutrition Assessment:  Improving nutrient intakes r/t alteration in respiratory status, AEB initially lower PO volumes, improving. Weight up, and received diuretic to aid fluid losses. Taking Ensure well. Estimated Daily Nutrient Needs:  Energy (kcal):  7209-2587(39-28); Weight Used for Energy Requirements:  Current     Protein (g):  68-74(1.2-1.3); Weight Used for Protein Requirements:  Ideal        Fluid (ml/day):  1800; Weight Used for Fluid Requirements:  Current      Wounds:  None       Current Nutrition Therapies:    DIET GENERAL;  Dietary Nutrition Supplements: Standard High Calorie Oral Supplement    Anthropometric Measures:  · Height: 5' 5\" (165.1 cm)  · Current Body Weight: 167 lb 6.4 oz (75.9 kg)   · Admission Body Weight: 145 lb (65.8 kg)    · Usual Body Weight: 147 lb 8 oz (66.9 kg)     · Ideal Body Weight: 125 lbs; % Ideal Body Weight 124.5 %   · BMI: 27.9  · Adjusted Body Weight:  ; No Adjustment   · BMI Categories: Overweight (BMI 25.0-29. 9)       Nutrition Interventions:   Food and/or Nutrient Delivery:  Continue Current Diet, Start Oral Nutrition Supplement (continue)  Nutrition Education/Counseling:  No recommendation at this time   Coordination of Nutrition Care:  Continued Inpatient Monitoring    Goals:  PO >75% meals and supplement use after solids       Nutrition Monitoring and Evaluation:   Behavioral-Environmental Outcomes:  (none)   Food/Nutrient Intake Outcomes:  Food and Nutrient Intake, Supplement Intake  Physical Signs/Symptoms Outcomes:  Fluid Status or Edema, Biochemical Data, Weight     Discharge Planning:    No discharge needs at this time     Electronically signed by Campbell Valerio RD, ANDREW on 8/19/20 at 12:58 PM EDT    Contact: [phone]

## 2020-08-19 NOTE — PROGRESS NOTES
Physical Therapy  Facility/Department: [facility] AT THE [facility] MED SURG  Daily Treatment Note  NAME: Adam Roberts  : 1945  MRN: [id number]    Date of Service: 2020    Discharge Recommendations:  Continue to assess pending progress        Assessment   Assessment: Pt demonstrated fair tolerance to tx, Very shakey with ambulation. Treatment Diagnosis: general weakness  Prognosis: Good  Decision Making: Medium Complexity  Patient Education: Educated pt on imporance of daily exercise, pt with appropriate understanding  REQUIRES PT FOLLOW UP: Yes     Patient Diagnosis(es): The primary encounter diagnosis was Altered mental status, unspecified altered mental status type. Diagnoses of Community acquired pneumonia of right lung, unspecified part of lung and Septicemia ([facility] [address] were also pertinent to this visit. has a past medical history of COPD (chronic obstructive pulmonary disease) ([facility] [address] Depression, GERD (gastroesophageal reflux disease), and Osteoporosis. has a past surgical history that includes Hysterectomy; back surgery; Breast surgery; Carpal tunnel release; joint replacement; joint replacement; fracture surgery (Left, 2018); and Wrist fracture surgery (Left, 2018). Restrictions  Restrictions/Precautions  Restrictions/Precautions: General Precautions, Fall Risk  Subjective   General  Chart Reviewed: Yes  Response To Previous Treatment: Patient with no complaints from previous session. Family / Caregiver Present: Yes  Referring Practitioner: Dr. Eddie Staley: Pt reports feeling better, LBP at 2-3/10 at this time.   Pain Screening  Patient Currently in Pain: Yes  Vital Signs  Patient Currently in Pain: Yes       Orientation  Orientation  Overall Orientation Status: Within Functional Limits  Cognition      Objective   Bed mobility  Supine to Sit: Contact guard assistance  Scooting: Contact guard assistance  Transfers  Sit to Stand: Contact guard assistance  Stand to sit: Contact room)     Therapy Time   Individual Concurrent Group Co-treatment   Time In 1100         Time Out 1127         Minutes [address] [place] [address], [id number]

## 2020-08-19 NOTE — PROGRESS NOTES
Progress Note    SUBJECTIVE: f/u on SOB    OBJECTIVE:  Sitting up in chair with oxygen off in no acute distress. Slight dyspnea. Just finished working with PT. Afebrile. States she is coughing but not able to bring anything up. VS stable. States last BM was 2 days ago. Denies CP although she has pain with coughing. Denies palpitations.      Vitals:   Vitals:    08/19/20 0600   BP: 114/66   Pulse: 61   Resp:    Temp:    SpO2: 90%     Weight: 167 lb 6.4 oz (75.9 kg)   Height: 5' 5\" (165.1 cm)   -----------------------------------------------------------------  Exam:    CONSTITUTIONAL:  awake, alert, cooperative, no apparent distress, and appears stated age  EYES:  Lids and lashes normal, pupils equal, round and reactive to light, extra ocular muscles intact, sclera clear, conjunctiva normal  ENT:  normocepalic, without obvious abnormality, atraumatic  NECK:  supple, symmetrical, trachea midline, skin normal and no stridor  HEMATOLOGIC/LYMPHATICS:  no cervical lymphadenopathy and no supraclavicular lymphadenopathy  LUNGS:  no increased work of breathing, good air exchange and crackles right base and left base  CARDIOVASCULAR:  Normal apical impulse, regular rate and rhythm, normal S1 and S2, no S3 or S4, and no murmur noted  ABDOMEN:  No scars, normal bowel sounds, soft, non-distended, non-tender, no masses palpated, no hepatosplenomegally  MUSCULOSKELETAL:  there is no redness, warmth, or swelling of the joints  full range of motion noted  motor strength is 5 out of 5 all extremities bilaterally  tone is normal  NEUROLOGIC:  Mental Status Exam:  Level of Alertness:   awake  Orientation:   person, place, time  Memory:   normal  SKIN:  no bruising or bleeding, normal skin color, texture, turgor, no redness, warmth, or swelling, no rashes and no lesions  EXT:     no cyanosis, clubbing or edema present    -----------------------------------------------------------------  Diagnostic Data: Reviewed    ASSESSMENT:

## 2020-08-19 NOTE — PROGRESS NOTES
RESPIRATORY ASSESSMENT PROTOCOL                                                                                              Patient Name: Alex Lovett Room#: 4073/6167-35 : 1945     Admitting diagnosis: Sepsis due to pneumonia ([place] [address] [J18.9, A41.9]       Medical History:   Past Medical History:   Diagnosis Date    COPD (chronic obstructive pulmonary disease) ([place] [address]     Depression     GERD (gastroesophageal reflux disease)     Osteoporosis        PATIENT ASSESSMENT    LABORATORY DATA  Hematology:   Lab Results   Component Value Date    WBC 10.0 2020    RBC 3.86 2020    HGB 11.6 2020    HCT 36.8 2020     2020     Chemistry:    Lab Results   Component Value Date    PHART 7.443 2020    KBC7MOC 43.1 2020    PO2ART 62.3 2020    U1PJSOJU 92.6 2020    TMM3YOI 28.8 2020    PBEA 4.2 2020       Blood Culture:   Sputum Culture:     VITALS  Pulse: 58   Resp: 16  BP: 128/73  SpO2: 93 % O2 Device: None (Room air)  Temp: 98.1 °F (36.7 °C)  Comment:   SKIN COLOR  [x] Normal  [] Pale  [] Dusky  [] Cyanotic      RESPIRATORY PATTERN  [x] Normal  [] Dyspnea  [] Cheyne-Granger  [] Kussmaul  [] Biots  AMBULATORY  [x] Yes  [] No  [x] With Assistance    PEAK FLOW  Predicted:     Personal Best:        VITAL CAPACITY  Predicted value:  ml  Actual Value:  ml  30% of Predicted:  ml  Patient Acuity 0 1 2 3 4 Score   Level of Concious (LOC) [x]  Alert & Oriented or Pt normal LOC []  Confused;follows directions []  Confused & uncooper-ative []  Obtunded []  Comatose 0   Respiratory Rate  (RR) []  Reg. rate & pattern. 12 - 20 bpm  []  Increased RR.  Greater than 20 bpm   [x]  SOB w/ exertion or RR greater than 24 bpm []  Access- ory muscle use at rest. Abn.  resp. []  SOB at rest.   2   Bilateral Breath Sounds (BBS) []  Clear [x]  Diminish-ed bases  []  Diminish-ed t/o, or rales   []  Sporadic, scattered wheezes or rhonchi []  Persistentwheezes and, or absent BBS 1   Cough [x]  Strong, effective, & non-prod. []  Effective & prod. Less than 25 ml (2 TBSP) over past 24 hrs []  Ineffective & non-prod to less than 25 ML over past 24 hrs []  Ineffective and, or greater than 25 ml sputum prod. past 24 hrs. []  Nonspon- taneous; Requires suctioning 0   Pulmonary History  (PULM HX) []  No smoking and no chronic pulmonaryhistory []  Former smoker. Quit over 12 mos. ago []  Current smoker or quit w/ in 12 mos []  Pulm. History and, or 20 pk/yr smoking hx [x]  Admitted w/ acute pulm. dx and, or has been admitted w/ pulm. dx 2 or more times over past 12 mos 4   Surgical History this Admit  (SURG HX) [x]  No surgery []  General surgery []  Lower abdominal []  Thoracic or upper abdominal   []  Thoracic w/ pulm. disease 0   Chest X-Ray (CXR)/CT Scan []  Clear or not applicable []  Not available []  Atelect- asis or pleural effusions [x]  Localized infiltrate or pulm. edema []  Con-solidated Infiltrates, bilateral, or in more than 1 lobe 3   Slow or Forced VC, FEV1 OR PEFR (PULM FXN)  [x]  80% or greater, or not indicated []  Pt. unable to perform []  FEV1 or PEFR or VC 51-79%. []  FEV1 or PEFR or VC  30-49%   []  FEV1 or PEFR or VC less than 30%   0   TOTAL ACUITY: 10       CARE PLAN    If Acuity Level is 2, 3, or 4 in any of the following:    [x] BILATERAL BREATH SOUNDS (BBS)     [x] PULMONARY HISTORY (PULM HX)  [] PULMONARY FUNCTION (PULM FX)    Goal: Improve respiratory functions in patients with airway disease and decrease WOB    [x] AEROSOL PROTOCOL    Total Acuity:   16-32  []  Secondary Assessment in 24 hrs Total Acuity:  9-15  [x]  Secondary Assessment in 24 hrs Total Acuity:  4-8  []  Secondary Assessment in 48 hrs Total Acuity:  0-3  []  Secondary Assessment in 72 hrs   HHN AEROSOL THERAPY with  [physician-ordered bronchodilator(s)] q 4 & Albuterol PRN q2 hrs. Breath-Actuated Neb if BBS Acuity = 4, and pt. can use MP. Notify physician if condition deteriorates.   HHN AEROSOL THERAPY with  [physician-ordered bronchodilator(s)]  QID and Albuterol PRN q4 hrs. Breath-Actuated Neb if BBS Acuity = 4, and pt. can use MP. Notify physician if condition deteriorates. MDI THERAPY with  2 actuations of [physician-ordered bronchodilator(s)] via spacer TID Albuterol and PRNq4 hrs. If unable to utilize MDI: HHN [physician-ordered bronchodilator(s)] TID and Albuterol PRN q4 hrs. Notify physician if condition deteriorates. MDI THERAPY with  [physician-ordered bronchodilator(s)] via spacer TID PRN. If unable to utilize MDI: HHN [physician-ordered bronchodilator(s)] TID PRN. Notify physician if condition deteriorates. If Acuity Level is 2, 3, or 4 in any of the following:    [] COUGH     [] SURGICAL HISTORY (SURG HX)  [x] CHEST XRAY (CXR)    Goal: Improvement in sputum mobilization in patients with ineffective airway clearance. Reverse atelectasis. [x] Bronchopulmonary Hygiene Protocol    Total Acuity:   16-32  []  Secondary Assessment in 24 hrs Total Acuity:  9-15  [x]  Secondary Assessment in 24 hrs Total Acuity:  4-8  []  Secondary Assessment in 48 hrs Total Acuity:  0-3  []  Secondary Assessment in 72 hrs   METANEB QID with [physician-ordered bronchodilator(s)] if CXR Acuity = 4; otherwise:  PD&P, PEP, or Vest QID & PRN  NT Sxn PRN for ineffective cough  METANEB QID with [physician-ordered bronchodilator(s)] if CXR Acuity = 4; otherwise:  PD&P, PEP, or Vest TID & PRN  NT Sxn PRN for ineffective cough  Instruct patient to self-perform IS q1hr WA   Directed Cough self-performed q1hr WA     If Acuity Level is 2 or above in the following:    [] PULMONARY HISTORY (PULM HX)    Goal: Assist patient in quitting smoking to slow or stop the progression of lung disease.     [] Smoking Cessation Protocol    SMOKING CESSATION EDUCATION provided according to policy ZH_052: (marlyn with an X)  ____Yes    ____ No     ____ NA    Smoking Cessation Booklet given:  ____Yes  ____No ____Patient Dylan Otero

## 2020-08-19 NOTE — PROGRESS NOTES
Physical Therapy  Facility/Department: [facility] AT THE [facility]  Daily Treatment Note  NAME: Be Brooks  : 1945  MRN: [id number]    Date of Service: 2020    Discharge Recommendations:  Continue to assess pending progress        Assessment   Assessment: Pt demonstrated fair tolerance to tx, Very shakey with ambulation. Treatment Diagnosis: general weakness  Prognosis: Good  Decision Making: Medium Complexity  Patient Education: Educated pt on imporance of daily exercise, pt with appropriate understanding  REQUIRES PT FOLLOW UP: Yes     Patient Diagnosis(es): The primary encounter diagnosis was Altered mental status, unspecified altered mental status type. Diagnoses of Community acquired pneumonia of right lung, unspecified part of lung and Septicemia ([facility] were also pertinent to this visit. has a past medical history of COPD (chronic obstructive pulmonary disease) ([facility] [facility] Depression, GERD (gastroesophageal reflux disease), and Osteoporosis. has a past surgical history that includes Hysterectomy; back surgery; Breast surgery; Carpal tunnel release; joint replacement; joint replacement; fracture surgery (Left, 2018); and Wrist fracture surgery (Left, 2018). Restrictions  Restrictions/Precautions  Restrictions/Precautions: General Precautions, Fall Risk  Subjective   General  Chart Reviewed: Yes  Response To Previous Treatment: Patient with no complaints from previous session. Family / Caregiver Present: No  Referring Practitioner: Dr. Donna Eason  Subjective  Subjective: Pt reports LB pain at 7/10 due to bed.           Orientation  Orientation  Overall Orientation Status: Within Functional Limits  Cognition      Objective   Bed mobility  Supine to Sit: Contact guard assistance  Scooting: Contact guard assistance  Transfers  Sit to Stand: Contact guard assistance  Stand to sit: Contact guard assistance  Stand Pivot Transfers: Contact guard assistance  Ambulation  Ambulation?: Yes  WB Status: unrestricted  Ambulation 1  Surface: level tile  Device: Rolling Walker  Assistance: Contact guard assistance;Minimal assistance  Quality of Gait: Patient very shaky. Some posterior leaning noted. FF posture  Gait Deviations: Slow Helen  Distance: 4 ft x2  Comments: Fall risk during ambulation     Balance  Sitting - Static: Good  Sitting - Dynamic: Good  Standing - Static: Fair  Standing - Dynamic: Fair;-  Exercises  Straight Leg Raise: x15  Quad Sets: x15  Heelslides: x15  Gluteal Sets: x15  Hip Abduction: 15x  Knee Long Arc Quad: 15x  Knee Short Arc Quad: x15  Ankle Pumps: 15x2  Comments: marching and hip add with pillow x15, AAROM as needed. LE ther ex completed seated and supine                        G-Code     OutComes Score                                                     AM-PAC Score             Goals  Short term goals  Time Frame for Short term goals: 10 days  Short term goal 1: Pt will be educated on her POC  Short term goal 2: Pt will perform all transfers Mod I in order to increase independence  Short term goal 3: Pt will ambulate 100 feet with LRAD SBA in order to use the bathroom  Short term goal 4: Pt will increase dynamic standing balance to Fair+ in order to reduce fall risk  Patient Goals   Patient goals : \"to feel better\"    Plan    Plan  Times per week: 7x/wk  Times per day: Twice a day  Plan weeks: 2x/daily except weekends 1x/daily  Current Treatment Recommendations: Strengthening, Home Exercise Program, Neuromuscular Re-education, Safety Education & Training, Balance Training, Endurance Training, Patient/Caregiver Education & Training, Functional Mobility Training, Transfer Training, Gait Training, Stair training  Safety Devices  Type of devices:  All fall risk precautions in place, Call light within reach, Gait belt, Left in chair, Nurse notified     Therapy Time   Individual Concurrent Group Co-treatment   Time In 0700         Time Out 0733         Minutes [address], [id number]

## 2020-08-19 NOTE — PROGRESS NOTES
Occupational Therapy  Facility/Department: [facility] AT THE [facility]  Daily Treatment Note  NAME: Robert Morton  : 1945  MRN: [id number]    Date of Service: 2020    Discharge Recommendations:  Continue to assess pending progress       Assessment      OT Education: OT Role;Plan of Care;Precautions; Family Education; Energy Conservation;Home Exercise Program  Patient Education: Patient educated on energy conservation techiques, importance of completing exercises/HEP outside of therapy, and the rationale for continued therapy after D/C from [facility] vs SNF). Barriers to Learning: None  Activity Tolerance  Activity Tolerance: Patient limited by fatigue  Safety Devices  Safety Devices in place: Yes  Type of devices: Left in chair;Call light within reach;Nurse notified         Patient Diagnosis(es): The primary encounter diagnosis was Altered mental status, unspecified altered mental status type. Diagnoses of Community acquired pneumonia of right lung, unspecified part of lung and Septicemia ([facility] [address] were also pertinent to this visit. has a past medical history of COPD (chronic obstructive pulmonary disease) ([address] Depression, GERD (gastroesophageal reflux disease), and Osteoporosis. has a past surgical history that includes Hysterectomy; back surgery; Breast surgery; Carpal tunnel release; joint replacement; joint replacement; fracture surgery (Left, 2018); and Wrist fracture surgery (Left, 2018).       Restrictions  Restrictions/Precautions  Restrictions/Precautions: General Precautions, Fall Risk     Subjective   General  Chart Reviewed: Yes  Patient assessed for rehabilitation services?: Yes  Response to previous treatment: Patient reporting fatigue but able to participate  Family / Caregiver Present: Yes ()  Referring Practitioner: Dr. Rodolfo oH  Diagnosis: Pneumonia  Subjective  Subjective: Patient denies pain at this time  General Comment  Comments: Patient sitting in bedside chair upon OT arrival,  present, agreeable to OT tx           Objective    ADL  Toileting: Minimal assistance  Additional Comments: Patient declines additional ADLs, stating \"I already washed up this morning\"  Balance  Sitting Balance: Supervision  Standing Balance: Contact guard assistance  Standing Balance  Time: < 30 sec  Activity: ADLs & functional mobility  Comment: Patient remains very weak and unsteady when standing  Functional Mobility  Functional Mobility Device: Rolling Walker  Activity: Other (To/from bedside commode)  Assist Level: Contact guard assistance  Toilet Transfers  Toilet Technique: Ambulating  Equipment Used: Standard bedside commode  Toilet Transfer: Contact guard assistance  Transfers  Stand Pivot Transfers: Contact guard assistance  Sit to stand: Contact guard assistance  Stand to sit: Contact guard assistance         Type of ROM/Therapeutic Exercise  Type of ROM/Therapeutic Exercise: AROM; Free weights  Comment: Patient engaged in BUE therapeutic exercises x 10 reps using 1# weight and then another 10 reps without a weight (AROM) due to compromised strength and reports of fatigue requiring frequent rest breaks and minimal verbal cues for form/technique and pacing for the purpose of improving strength and endurance required for ADLs.   Exercises  Shoulder Flexion: x 10 reps, 2 sets  Shoulder Extension: x 10 reps, 2 sets  Shoulder ABduction: x 10 reps, 2 sets  Shoulder ADduction: x 10 reps, 2 sets  Horizontal ABduction: x 10 reps, 2 sets  Horizontal ADduction: x 10 reps, 2 sets  Elbow Flexion: x 10 reps, 2 sets  Elbow Extension: x 10 reps, 2 sets  Supination: x 10 reps, 2 sets  Pronation: x 10 reps, 2 sets  Wrist Flexion: x 10 reps, 2 sets  Wrist Extension: x 10 reps, 2 sets                    Plan   Plan  Times per week: 7x/week  Times per day: Daily  Current Treatment Recommendations: Strengthening, ROM, Safety Education & Training, Balance Training, Patient/Caregiver Education & Training, Self-Care / ADL,

## 2020-08-19 NOTE — PLAN OF CARE
Ongoing  Note: Lungs improving. Vitals stable. Labs improving. Problem: Falls - Risk of:  Goal: Will remain free from falls  Description: Will remain free from falls  Outcome: Ongoing  Note: Patient at high risk for falls. Fall precautions in place. Call light within reach at all times. Problem: Nutrition  Goal: Optimal nutrition therapy  Outcome: Ongoing  Note: Patient consuming 100% of supplement. Problem: Pain:  Description: Pain management should include both nonpharmacologic and pharmacologic interventions. Goal: Pain level will decrease  Description: Pain level will decrease  Outcome: Ongoing  Note: Patient continues with chronic back pain. Medicating with Norco per PRN orders.   Goal: Control of chronic pain  Description: Control of chronic pain  Outcome: Ongoing     Problem: Tissue Perfusion, Altered:  Goal: Circulatory function within specified parameters  Description: Circulatory function within specified parameters  Outcome: Completed

## 2020-08-20 LAB
ABSOLUTE EOS #: 0 K/UL (ref 0–0.44)
ABSOLUTE IMMATURE GRANULOCYTE: 0 K/UL (ref 0–0.3)
ABSOLUTE LYMPH #: 0.41 K/UL (ref 1.1–3.7)
ABSOLUTE MONO #: 0.2 K/UL (ref 0.1–1.2)
ALBUMIN SERPL-MCNC: 3.4 G/DL (ref 3.5–5.2)
ALBUMIN/GLOBULIN RATIO: 1.4 (ref 1–2.5)
ALP BLD-CCNC: 57 U/L (ref 35–104)
ALT SERPL-CCNC: 16 U/L (ref 5–33)
ANION GAP SERPL CALCULATED.3IONS-SCNC: 8 MMOL/L (ref 9–17)
AST SERPL-CCNC: 14 U/L
BASOPHILS # BLD: 0 % (ref 0–2)
BASOPHILS ABSOLUTE: 0 K/UL (ref 0–0.2)
BILIRUB SERPL-MCNC: <0.1 MG/DL (ref 0.3–1.2)
BUN BLDV-MCNC: 15 MG/DL (ref 8–23)
BUN/CREAT BLD: 29 (ref 9–20)
CALCIUM SERPL-MCNC: 9.2 MG/DL (ref 8.6–10.4)
CHLORIDE BLD-SCNC: 106 MMOL/L (ref 98–107)
CO2: 29 MMOL/L (ref 20–31)
CREAT SERPL-MCNC: 0.52 MG/DL (ref 0.5–0.9)
DIFFERENTIAL TYPE: ABNORMAL
EOSINOPHILS RELATIVE PERCENT: 0 % (ref 1–4)
GFR AFRICAN AMERICAN: >60 ML/MIN
GFR NON-AFRICAN AMERICAN: >60 ML/MIN
GFR SERPL CREATININE-BSD FRML MDRD: ABNORMAL ML/MIN/{1.73_M2}
GFR SERPL CREATININE-BSD FRML MDRD: ABNORMAL ML/MIN/{1.73_M2}
GLUCOSE BLD-MCNC: 139 MG/DL (ref 70–99)
HCT VFR BLD CALC: 36.8 % (ref 36.3–47.1)
HEMOGLOBIN: 12 G/DL (ref 11.9–15.1)
IMMATURE GRANULOCYTES: 0 %
LYMPHOCYTES # BLD: 6 % (ref 24–43)
MAGNESIUM: 2.3 MG/DL (ref 1.6–2.6)
MCH RBC QN AUTO: 30 PG (ref 25.2–33.5)
MCHC RBC AUTO-ENTMCNC: 32.6 G/DL (ref 28.4–34.8)
MCV RBC AUTO: 92 FL (ref 82.6–102.9)
METAMYELOCYTES ABSOLUTE COUNT: 0.07 K/UL
METAMYELOCYTES: 1 %
MONOCYTES # BLD: 3 % (ref 3–12)
MORPHOLOGY: NORMAL
NRBC AUTOMATED: 0 PER 100 WBC
PDW BLD-RTO: 16.1 % (ref 11.8–14.4)
PLATELET # BLD: 226 K/UL (ref 138–453)
PLATELET ESTIMATE: ABNORMAL
PMV BLD AUTO: 9.8 FL (ref 8.1–13.5)
POTASSIUM SERPL-SCNC: 3.4 MMOL/L (ref 3.7–5.3)
RBC # BLD: 4 M/UL (ref 3.95–5.11)
RBC # BLD: ABNORMAL 10*6/UL
SEG NEUTROPHILS: 90 % (ref 36–65)
SEGMENTED NEUTROPHILS ABSOLUTE COUNT: 6.12 K/UL (ref 1.5–8.1)
SODIUM BLD-SCNC: 143 MMOL/L (ref 135–144)
TOTAL PROTEIN: 5.9 G/DL (ref 6.4–8.3)
WBC # BLD: 6.8 K/UL (ref 3.5–11.3)
WBC # BLD: ABNORMAL 10*3/UL

## 2020-08-20 PROCEDURE — 6360000002 HC RX W HCPCS: Performed by: NURSE PRACTITIONER

## 2020-08-20 PROCEDURE — 94669 MECHANICAL CHEST WALL OSCILL: CPT

## 2020-08-20 PROCEDURE — 1200000000 HC SEMI PRIVATE

## 2020-08-20 PROCEDURE — 97116 GAIT TRAINING THERAPY: CPT

## 2020-08-20 PROCEDURE — 94664 DEMO&/EVAL PT USE INHALER: CPT

## 2020-08-20 PROCEDURE — 97110 THERAPEUTIC EXERCISES: CPT

## 2020-08-20 PROCEDURE — 80053 COMPREHEN METABOLIC PANEL: CPT

## 2020-08-20 PROCEDURE — 85025 COMPLETE CBC W/AUTO DIFF WBC: CPT

## 2020-08-20 PROCEDURE — 6370000000 HC RX 637 (ALT 250 FOR IP): Performed by: NURSE PRACTITIONER

## 2020-08-20 PROCEDURE — 83735 ASSAY OF MAGNESIUM: CPT

## 2020-08-20 PROCEDURE — 97535 SELF CARE MNGMENT TRAINING: CPT

## 2020-08-20 PROCEDURE — 2580000003 HC RX 258: Performed by: NURSE PRACTITIONER

## 2020-08-20 PROCEDURE — 94640 AIRWAY INHALATION TREATMENT: CPT

## 2020-08-20 PROCEDURE — 36415 COLL VENOUS BLD VENIPUNCTURE: CPT

## 2020-08-20 RX ORDER — POTASSIUM CHLORIDE 20 MEQ/1
40 TABLET, EXTENDED RELEASE ORAL ONCE
Status: COMPLETED | OUTPATIENT
Start: 2020-08-20 | End: 2020-08-20

## 2020-08-20 RX ORDER — FUROSEMIDE 10 MG/ML
40 INJECTION INTRAMUSCULAR; INTRAVENOUS ONCE
Status: COMPLETED | OUTPATIENT
Start: 2020-08-20 | End: 2020-08-20

## 2020-08-20 RX ADMIN — TRAZODONE HYDROCHLORIDE 200 MG: 50 TABLET ORAL at 21:54

## 2020-08-20 RX ADMIN — IPRATROPIUM BROMIDE AND ALBUTEROL SULFATE 1 AMPULE: .5; 3 SOLUTION RESPIRATORY (INHALATION) at 15:47

## 2020-08-20 RX ADMIN — METHYLPREDNISOLONE SODIUM SUCCINATE 60 MG: 125 INJECTION, POWDER, FOR SOLUTION INTRAMUSCULAR; INTRAVENOUS at 23:17

## 2020-08-20 RX ADMIN — SODIUM CHLORIDE, PRESERVATIVE FREE 10 ML: 5 INJECTION INTRAVENOUS at 22:01

## 2020-08-20 RX ADMIN — SODIUM CHLORIDE, PRESERVATIVE FREE 10 ML: 5 INJECTION INTRAVENOUS at 09:22

## 2020-08-20 RX ADMIN — IPRATROPIUM BROMIDE AND ALBUTEROL SULFATE 1 AMPULE: .5; 3 SOLUTION RESPIRATORY (INHALATION) at 10:42

## 2020-08-20 RX ADMIN — PREGABALIN 300 MG: 75 CAPSULE ORAL at 09:21

## 2020-08-20 RX ADMIN — DULOXETINE HYDROCHLORIDE 60 MG: 60 CAPSULE, DELAYED RELEASE ORAL at 09:21

## 2020-08-20 RX ADMIN — METHYLPREDNISOLONE SODIUM SUCCINATE 60 MG: 125 INJECTION, POWDER, FOR SOLUTION INTRAMUSCULAR; INTRAVENOUS at 17:22

## 2020-08-20 RX ADMIN — POLYETHYLENE GLYCOL 3350 17 G: 17 POWDER, FOR SOLUTION ORAL at 09:22

## 2020-08-20 RX ADMIN — POTASSIUM CHLORIDE 40 MEQ: 1500 TABLET, EXTENDED RELEASE ORAL at 09:21

## 2020-08-20 RX ADMIN — PANTOPRAZOLE SODIUM 40 MG: 40 TABLET, DELAYED RELEASE ORAL at 07:41

## 2020-08-20 RX ADMIN — PREGABALIN 300 MG: 75 CAPSULE ORAL at 21:54

## 2020-08-20 RX ADMIN — IPRATROPIUM BROMIDE AND ALBUTEROL SULFATE 1 AMPULE: .5; 3 SOLUTION RESPIRATORY (INHALATION) at 21:16

## 2020-08-20 RX ADMIN — HYDROCODONE BITARTRATE AND ACETAMINOPHEN 1 TABLET: 10; 325 TABLET ORAL at 21:54

## 2020-08-20 RX ADMIN — ASPIRIN 81 MG 81 MG: 81 TABLET ORAL at 09:21

## 2020-08-20 RX ADMIN — AZITHROMYCIN MONOHYDRATE 500 MG: 500 INJECTION, POWDER, LYOPHILIZED, FOR SOLUTION INTRAVENOUS at 23:17

## 2020-08-20 RX ADMIN — FUROSEMIDE 40 MG: 10 INJECTION, SOLUTION INTRAMUSCULAR; INTRAVENOUS at 09:21

## 2020-08-20 RX ADMIN — IPRATROPIUM BROMIDE AND ALBUTEROL SULFATE 1 AMPULE: .5; 3 SOLUTION RESPIRATORY (INHALATION) at 06:07

## 2020-08-20 RX ADMIN — HYDROCODONE BITARTRATE AND ACETAMINOPHEN 1 TABLET: 10; 325 TABLET ORAL at 07:42

## 2020-08-20 RX ADMIN — METHYLPREDNISOLONE SODIUM SUCCINATE 60 MG: 125 INJECTION, POWDER, FOR SOLUTION INTRAMUSCULAR; INTRAVENOUS at 07:41

## 2020-08-20 RX ADMIN — HYDROCODONE BITARTRATE AND ACETAMINOPHEN 1 TABLET: 10; 325 TABLET ORAL at 14:07

## 2020-08-20 RX ADMIN — LEVOTHYROXINE SODIUM 150 MCG: 150 TABLET ORAL at 07:41

## 2020-08-20 RX ADMIN — ENOXAPARIN SODIUM 40 MG: 40 INJECTION, SOLUTION INTRAVENOUS; SUBCUTANEOUS at 14:07

## 2020-08-20 RX ADMIN — WATER 1 G: 1 INJECTION INTRAMUSCULAR; INTRAVENOUS; SUBCUTANEOUS at 23:17

## 2020-08-20 ASSESSMENT — PAIN DESCRIPTION - LOCATION
LOCATION: BACK;NECK
LOCATION: BACK

## 2020-08-20 ASSESSMENT — PAIN DESCRIPTION - PAIN TYPE
TYPE: CHRONIC PAIN

## 2020-08-20 ASSESSMENT — PAIN DESCRIPTION - ONSET: ONSET: ON-GOING

## 2020-08-20 ASSESSMENT — PAIN SCALES - GENERAL
PAINLEVEL_OUTOF10: 6
PAINLEVEL_OUTOF10: 6
PAINLEVEL_OUTOF10: 7
PAINLEVEL_OUTOF10: 6

## 2020-08-20 ASSESSMENT — PAIN DESCRIPTION - FREQUENCY
FREQUENCY: CONTINUOUS
FREQUENCY: CONTINUOUS

## 2020-08-20 ASSESSMENT — PAIN DESCRIPTION - ORIENTATION
ORIENTATION: MID;LOWER
ORIENTATION: MID;LOWER

## 2020-08-20 ASSESSMENT — PAIN DESCRIPTION - DESCRIPTORS
DESCRIPTORS: ACHING
DESCRIPTORS: ACHING

## 2020-08-20 NOTE — PROGRESS NOTES
Progress Note    SUBJECTIVE:  F/u on SOB    OBJECTIVE:  Sitting up in chair alert and oriented in no acute distress. Complains of CP with cough SOB. Afebrile. Difficult to transfer. Legs shakey and weak.       Vitals:   Vitals:    08/20/20 0736   BP: (!) 165/75   Pulse: 88   Resp: 16   Temp: 98.5 °F (36.9 °C)   SpO2: 94%     Weight: 166 lb 3.2 oz (75.4 kg)   Height: 5' 5\" (165.1 cm)   -----------------------------------------------------------------  Exam:    CONSTITUTIONAL:  awake, alert, cooperative, no apparent distress, and appears stated age  EYES:  Lids and lashes normal, pupils equal, round and reactive to light, extra ocular muscles intact, sclera clear, conjunctiva normal  ENT:  normocepalic, without obvious abnormality, atraumatic  NECK:  supple, symmetrical, trachea midline, skin normal and no stridor  HEMATOLOGIC/LYMPHATICS:  no cervical lymphadenopathy and no supraclavicular lymphadenopathy  BACK:  symmetric and no curvature  LUNGS:  No increased work of breathing, good air exchange, clear to auscultation bilaterally, no crackles or wheezing  CARDIOVASCULAR:  Normal apical impulse, regular rate and rhythm, normal S1 and S2, no S3 or S4, and no murmur noted  ABDOMEN:  No scars, normal bowel sounds, soft, non-distended, non-tender, no masses palpated, no hepatosplenomegally  MUSCULOSKELETAL:  there is no redness, warmth, or swelling of the joints  full range of motion noted  motor strength is 5 out of 5 all extremities bilaterally  tone is normal  NEUROLOGIC:  Mental Status Exam:  Level of Alertness:   awake  Orientation:   person, place, time  Memory:   normal  SKIN:  no bruising or bleeding, normal skin color, texture, turgor, no redness, warmth, or swelling, no rashes and no lesions  EXT:     no cyanosis, clubbing or edema present    -----------------------------------------------------------------  Diagnostic Data: Reviewed    ASSESSMENT:   Principal Problem:    Sepsis due to pneumonia ([facility]  Active

## 2020-08-20 NOTE — PROGRESS NOTES
Patient was assisted up to the [facility] x2 assist and walker. Patient is very weak and with tremors to all extremities with ambulation.

## 2020-08-20 NOTE — PROGRESS NOTES
BBS 1   Cough [x]  Strong, effective, & non-prod. []  Effective & prod. Less than 25 ml (2 TBSP) over past 24 hrs []  Ineffective & non-prod to less than 25 ML over past 24 hrs []  Ineffective and, or greater than 25 ml sputum prod. past 24 hrs. []  Nonspon- taneous; Requires suctioning 0   Pulmonary History  (PULM HX) []  No smoking and no chronic pulmonaryhistory []  Former smoker. Quit over 12 mos. ago []  Current smoker or quit w/ in 12 mos []  Pulm. History and, or 20 pk/yr smoking hx [x]  Admitted w/ acute pulm. dx and, or has been admitted w/ pulm. dx 2 or more times over past 12 mos 4   Surgical History this Admit  (SURG HX) [x]  No surgery []  General surgery []  Lower abdominal []  Thoracic or upper abdominal   []  Thoracic w/ pulm. disease 0   Chest X-Ray (CXR)/CT Scan []  Clear or not applicable []  Not available []  Atelect- asis or pleural effusions [x]  Localized infiltrate or pulm. edema []  Con-solidated Infiltrates, bilateral, or in more than 1 lobe 3   Slow or Forced VC, FEV1 OR PEFR (PULM FXN)  [x]  80% or greater, or not indicated []  Pt. unable to perform []  FEV1 or PEFR or VC 51-79%. []  FEV1 or PEFR or VC  30-49%   []  FEV1 or PEFR or VC less than 30%   0   TOTAL ACUITY: 10       CARE PLAN    If Acuity Level is 2, 3, or 4 in any of the following:    [] BILATERAL BREATH SOUNDS (BBS)     [x] PULMONARY HISTORY (PULM HX)  [] PULMONARY FUNCTION (PULM FX)    Goal: Improve respiratory functions in patients with airway disease and decrease WOB    [x] AEROSOL PROTOCOL    Total Acuity:   16-32  []  Secondary Assessment in 24 hrs Total Acuity:  9-15  [x]  Secondary Assessment in 24 hrs Total Acuity:  4-8  []  Secondary Assessment in 48 hrs Total Acuity:  0-3  []  Secondary Assessment in 72 hrs   HHN AEROSOL THERAPY with  [physician-ordered bronchodilator(s)] q 4 & Albuterol PRN q2 hrs. Breath-Actuated Neb if BBS Acuity = 4, and pt. can use MP. Notify physician if condition deteriorates.   HHN AEROSOL THERAPY with  [physician-ordered bronchodilator(s)]  QID and Albuterol PRN q4 hrs. Breath-Actuated Neb if BBS Acuity = 4, and pt. can use MP. Notify physician if condition deteriorates. MDI THERAPY with  2 actuations of [physician-ordered bronchodilator(s)] via spacer TID Albuterol and PRNq4 hrs. If unable to utilize MDI: HHN [physician-ordered bronchodilator(s)] TID and Albuterol PRN q4 hrs. Notify physician if condition deteriorates. MDI THERAPY with  [physician-ordered bronchodilator(s)] via spacer TID PRN. If unable to utilize MDI: HHN [physician-ordered bronchodilator(s)] TID PRN. Notify physician if condition deteriorates. If Acuity Level is 2, 3, or 4 in any of the following:    [] COUGH     [] SURGICAL HISTORY (SURG HX)  [x] CHEST XRAY (CXR)    Goal: Improvement in sputum mobilization in patients with ineffective airway clearance. Reverse atelectasis. [x] Bronchopulmonary Hygiene Protocol    Total Acuity:   16-32  []  Secondary Assessment in 24 hrs Total Acuity:  9-15  [x]  Secondary Assessment in 24 hrs Total Acuity:  4-8  []  Secondary Assessment in 48 hrs Total Acuity:  0-3  []  Secondary Assessment in 72 hrs   METANEB QID with [physician-ordered bronchodilator(s)] if CXR Acuity = 4; otherwise:  PD&P, PEP, or Vest QID & PRN  NT Sxn PRN for ineffective cough  METANEB QID with [physician-ordered bronchodilator(s)] if CXR Acuity = 4; otherwise:  PD&P, PEP, or Vest TID & PRN  NT Sxn PRN for ineffective cough  Instruct patient to self-perform IS q1hr WA   Directed Cough self-performed q1hr WA     If Acuity Level is 2 or above in the following:    [] PULMONARY HISTORY (PULM HX)    Goal: Assist patient in quitting smoking to slow or stop the progression of lung disease.     [] Smoking Cessation Protocol    SMOKING CESSATION EDUCATION provided according to policy [id number]_319: (marlyn with an X)  ____Yes    ____ No     ____ NA    Smoking Cessation Booklet given:  ____Yes  ____No ____Patient Dayanara Sees

## 2020-08-20 NOTE — PROGRESS NOTES
Patient assessment and vitals completed. Patient does have chronic pain, she denies any need for PRN medication. Patient denies dizziness or SOB at this time. She is up in chair, spouse at bedside.  Nurse will continue to monitor, denies further needs

## 2020-08-20 NOTE — PLAN OF CARE
Problem: Discharge Planning:  Goal: Discharged to appropriate level of care  Description: Discharged to appropriate level of care  Outcome: Ongoing     Problem: Gas Exchange - Impaired:  Goal: Levels of oxygenation will improve  Description: Levels of oxygenation will improve  Outcome: Ongoing  Note: Patient has been on RA, her saturation has remained above 90%     Problem: Infection, Septic Shock:  Goal: Will show no infection signs and symptoms  Description: Will show no infection signs and symptoms  Outcome: Ongoing  Note: Patient with no signs of septic shock. Patient is afebrile      Problem: Venous Thromboembolism:  Goal: Will show no signs or symptoms of venous thromboembolism  Description: Will show no signs or symptoms of venous thromboembolism  Outcome: Ongoing  Note: Patient without signs and symptoms of blood clots  Goal: Absence of signs or symptoms of impaired coagulation  Description: Absence of signs or symptoms of impaired coagulation  Outcome: Ongoing  Note: Patient is being monitored for risks for DVT  See MAR for meds      Problem: Skin Integrity:  Goal: Will show no infection signs and symptoms  Description: Will show no infection signs and symptoms  Outcome: Ongoing  Note: Patient has no signs of skin infection  She has remained afebrile  Her labs are being monitored   Goal: Absence of new skin breakdown  Description: Absence of new skin breakdown  Outcome: Ongoing  Note: Nurse did chart open area to buttocks, not previously charted from other shift      Problem: Falls - Risk of:  Goal: Will remain free from falls  Description: Will remain free from falls  Outcome: Ongoing  Note: Patient is a HFR. She is unsteady using walker and 2 assist to [facility] or chair. Patient has non slip socks, bed alarm active, side rails x2.      No falls   Goal: Absence of physical injury  Description: Absence of physical injury  Outcome: Ongoing  Note: No injuries sustained here      Problem: Nutrition  Goal: Optimal

## 2020-08-20 NOTE — PROGRESS NOTES
Physical Therapy  Facility/Department: [facility] AT THE [facility] MED SURG  Daily Treatment Note  NAME: Chriss De Luna  : 1945  MRN: [id number]    Date of Service: 2020    Discharge Recommendations:  Continue to assess pending progress        Assessment   Assessment: Pt demonstrated fair tolerance to tx, Very shakey with ambulation. Treatment Diagnosis: general weakness  Prognosis: Good  PT Education: General Safety;Gait Training;Transfer Training;Family Education  Patient Education: Educated pt on imporance of daily exercise, pt with appropriate understanding. Patient educated on benefits of continued therapy after discharge. .  REQUIRES PT FOLLOW UP: Yes  Activity Tolerance  Activity Tolerance: Patient Tolerated treatment well;Patient limited by endurance     Patient Diagnosis(es): The primary encounter diagnosis was Altered mental status, unspecified altered mental status type. Diagnoses of Community acquired pneumonia of right lung, unspecified part of lung and Septicemia ([facility] were also pertinent to this visit. has a past medical history of COPD (chronic obstructive pulmonary disease) ([facility] [address] Depression, GERD (gastroesophageal reflux disease), and Osteoporosis. has a past surgical history that includes Hysterectomy; back surgery; Breast surgery; Carpal tunnel release; joint replacement; joint replacement; fracture surgery (Left, 2018); and Wrist fracture surgery (Left, 2018). Restrictions  Restrictions/Precautions  Restrictions/Precautions: General Precautions, Fall Risk  Subjective   General  Chart Reviewed: Yes  Response To Previous Treatment: Patient with no complaints from previous session. Family / Caregiver Present: Yes  Referring Practitioner: Dr. Vallejo Ra: Patient voices no concerns at this time.           Orientation  Orientation  Overall Orientation Status: Within Functional Limits  Cognition      Objective   Bed mobility  Comment: Patient up in recliner upon arrival.  Transfers  Sit to Stand: Contact guard assistance  Stand to sit: Contact guard assistance  Comment: Patient very shaky  Ambulation  Ambulation?: Yes  Ambulation 1  Surface: level tile  Device: Rolling Walker  Assistance: Contact guard assistance  Quality of Gait: Weakness noted but demonstrates decreased shakiness. Gait Deviations: Slow Helen;Decreased step length;Decreased step height  Distance: 10'x1 and 20'x2  Comments: Several short standing rest periods  Stairs/Curb  Stairs?: No        Exercises  Straight Leg Raise: x20  Quad Sets: x20  Heelslides: x20  Gluteal Sets: x20  Hip Flexion: x20  Hip Abduction: x20  Knee Long Arc Quad: x20  Knee Short Arc Quad: x20  Ankle Pumps: x20  Comments: Above listed exercises completed in seated position. Goals  Short term goals  Time Frame for Short term goals: 10 days  Short term goal 1: Pt will be educated on her POC  Short term goal 2: Pt will perform all transfers Mod I in order to increase independence  Short term goal 3: Pt will ambulate 100 feet with LRAD SBA in order to use the bathroom  Short term goal 4: Pt will increase dynamic standing balance to Fair+ in order to reduce fall risk  Patient Goals   Patient goals : \"to feel better\"    Plan    Plan  Times per week: 7x/wk  Times per day: Twice a day  Plan weeks: 2x/daily except weekends 1x/daily  Current Treatment Recommendations: Strengthening, Home Exercise Program, Neuromuscular Re-education, Safety Education & Training, Balance Training, Endurance Training, Patient/Caregiver Education & Training, Functional Mobility Training, Transfer Training, Gait Training, Stair training  Safety Devices  Type of devices:  All fall risk precautions in place, Call light within reach, Gait belt, Left in chair, Nurse notified     Therapy Time   Individual Concurrent Group Co-treatment   Time In 1130         Time Out [address] [place], [place]

## 2020-08-20 NOTE — PROGRESS NOTES
Patient only required x1 assist an walker to [facility] at this time.  She returned back to bed and is now eating some PB crackers

## 2020-08-20 NOTE — PROGRESS NOTES
Physical Therapy  Facility/Department: [facility] AT THE [facility] MED SURG  Daily Treatment Note  NAME: Chriss De Luna  : 1945  MRN: [id number]    Date of Service: 2020    Discharge Recommendations:  Continue to assess pending progress        Assessment   Assessment: Pt demonstrated fair tolerance to tx, Very shakey with ambulation. Treatment Diagnosis: general weakness  Prognosis: Good  PT Education: General Safety;Gait Training;Transfer Training;Family Education  Patient Education: Educated pt on imporance of daily exercise, pt with appropriate understanding. Patient educated on benefits of [place] therapy. REQUIRES PT FOLLOW UP: Yes  Activity Tolerance  Activity Tolerance: Patient Tolerated treatment well;Patient limited by endurance; Patient limited by fatigue     Patient Diagnosis(es): The primary encounter diagnosis was Altered mental status, unspecified altered mental status type. Diagnoses of Community acquired pneumonia of right lung, unspecified part of lung and Septicemia ([address] were also pertinent to this visit. has a past medical history of COPD (chronic obstructive pulmonary disease) ([address] Depression, GERD (gastroesophageal reflux disease), and Osteoporosis. has a past surgical history that includes Hysterectomy; back surgery; Breast surgery; Carpal tunnel release; joint replacement; joint replacement; fracture surgery (Left, 2018); and Wrist fracture surgery (Left, 2018). Restrictions  Restrictions/Precautions  Restrictions/Precautions: General Precautions, Fall Risk  Subjective   General  Chart Reviewed: Yes  Response To Previous Treatment: Patient with no complaints from previous session. Family / Caregiver Present: Yes  Referring Practitioner: Dr. Vallejo Ra: Patient reports that she is starting to feel a little better.           Orientation     Cognition      Objective   Bed mobility  Comment: Patient up in recliner upon arrival.  Transfers  Sit to Stand: Contact guard assistance  Stand to sit: Contact guard assistance  Comment: Patient very shaky  Ambulation  Ambulation?: Yes  Ambulation 1  Surface: level tile  Device: Rolling Walker  Assistance: Contact guard assistance;Minimal assistance  Quality of Gait: Patient very shaky. Some posterior leaning noted. FF posture  Distance: 10'x2  Comments: Fall risk during ambulation. Patient completed above distance in about 14 minutes with several standing rest breaks. Very shaky. Stairs/Curb  Stairs?: No        Exercises  Straight Leg Raise: x20  Quad Sets: x20  Heelslides: x20  Gluteal Sets: x20  Hip Flexion: x20  Knee Long Arc Quad: x20  Knee Short Arc Quad: x20  Ankle Pumps: x20  Comments: Above listed exercises completed in seated and reclined position. Goals  Short term goals  Time Frame for Short term goals: 10 days  Short term goal 1: Pt will be educated on her POC  Short term goal 2: Pt will perform all transfers Mod I in order to increase independence  Short term goal 3: Pt will ambulate 100 feet with LRAD SBA in order to use the bathroom  Short term goal 4: Pt will increase dynamic standing balance to Fair+ in order to reduce fall risk  Patient Goals   Patient goals : \"to feel better\"    Plan    Plan  Times per week: 7x/wk  Times per day: Twice a day  Plan weeks: 2x/daily except weekends 1x/daily  Current Treatment Recommendations: Strengthening, Home Exercise Program, Neuromuscular Re-education, Safety Education & Training, Balance Training, Endurance Training, Patient/Caregiver Education & Training, Functional Mobility Training, Transfer Training, Gait Training, Stair training  Safety Devices  Type of devices:  All fall risk precautions in place, Call light within reach, Gait belt, Left in chair, Nurse notified     Therapy Time   Individual Concurrent Group Co-treatment   Time In 0822         Time Out 0903         Min[address] [place] [address]

## 2020-08-20 NOTE — PROGRESS NOTES
Attempt to ambulate patient to bathroom x2 assist of staff and wheeled walker. Patient with increased weakness, shakiness noted in legs. Unable to make it to bathroom, ambulates approx 10 feet and utilized BSC. Unable to return to chair. Stand and pivot with assistance of staff to chair when finished. Patient tearful and apologetic, upset at her inability to ambulate and current weakness. Assurance given, encouraged to keep working with therapy. Discussed possible discharge options. Patient adamant that she will work with therapy and increase strength. Up in chair, breakfast provided. Call light in reach.

## 2020-08-20 NOTE — PLAN OF CARE
Problem: Discharge Planning:  Goal: Discharged to appropriate level of care  Description: Discharged to appropriate level of care  8/20/2020 1536 by Marie Worley RN  Outcome: Ongoing  8/20/2020 0304 by Hoover Dakins, RN  Outcome: Ongoing     Problem: Gas Exchange - Impaired:  Goal: Levels of oxygenation will improve  Description: Levels of oxygenation will improve  8/20/2020 1536 by Marie Worley RN  Outcome: Ongoing  8/20/2020 0304 by Hoover Dakins, RN  Outcome: Ongoing  Note: Patient has been on RA, her saturation has remained above 90%     Problem: Infection, Septic Shock:  Goal: Will show no infection signs and symptoms  Description: Will show no infection signs and symptoms  8/20/2020 1536 by Marie Worley RN  Outcome: Ongoing  8/20/2020 0304 by Hoover Dakins, RN  Outcome: Ongoing  Note: Patient with no signs of septic shock.  Patient is afebrile      Problem: Venous Thromboembolism:  Goal: Will show no signs or symptoms of venous thromboembolism  Description: Will show no signs or symptoms of venous thromboembolism  8/20/2020 1536 by Marie Worley RN  Outcome: Ongoing  8/20/2020 0304 by Hoover Dakins, RN  Outcome: Ongoing  Note: Patient without signs and symptoms of blood clots  Goal: Absence of signs or symptoms of impaired coagulation  Description: Absence of signs or symptoms of impaired coagulation  8/20/2020 0304 by Hoover Dakins, RN  Outcome: Ongoing  Note: Patient is being monitored for risks for DVT  See [facility] for meds      Problem: Skin Integrity:  Goal: Will show no infection signs and symptoms  Description: Will show no infection signs and symptoms  8/20/2020 1536 by Marie Worley RN  Outcome: Ongoing  8/20/2020 0304 by Hoover Dakins, RN  Outcome: Ongoing  Note: Patient has no signs of skin infection  She has remained afebrile  Her labs are being monitored   Goal: Absence of new skin breakdown  Description: Absence of new skin breakdown  8/20/2020 0304 by Hoover Dakins, RN  Outcome: Ongoing  Note: Nurse did chart open area to buttocks, not previously charted from other shift      Problem: Falls - Risk of:  Goal: Will remain free from falls  Description: Will remain free from falls  8/20/2020 1536 by Maeola Homans, RN  Outcome: Ongoing  8/20/2020 0304 by Blayne Jose RN  Outcome: Ongoing  Note: Patient is a HFR. She is unsteady using walker and 2 assist to [facility] or chair. Patient has non slip socks, bed alarm active, side rails x2. No falls   Goal: Absence of physical injury  Description: Absence of physical injury  8/20/2020 0304 by Blayne Jose RN  Outcome: Ongoing  Note: No injuries sustained here      Problem: Nutrition  Goal: Optimal nutrition therapy  8/20/2020 0304 by Blayne Jose RN  Outcome: Ongoing  Note: Patient intake being monitored      Problem: Pain:  Goal: Pain level will decrease  Description: Pain level will decrease  8/20/2020 1536 by Maeola Homans, RN  Outcome: Ongoing  8/20/2020 0304 by Blayne Jose RN  Outcome: Ongoing  Note: Patient has chronic pain  Goal: Control of acute pain  Description: Control of acute pain  8/20/2020 0304 by Blayne Jose RN  Outcome: Ongoing  Note: Patient has chronic pain, no c/o acute pain  Goal: Control of chronic pain  Description: Control of chronic pain  8/20/2020 0304 by Blayne Jose RN  Outcome: Ongoing  Note: Patient has chronic pain. She has a dilaudid pain pump and has norco ordered prn. She has taken the norco x1.  Patient has had periods of rest and sleep

## 2020-08-20 NOTE — PROGRESS NOTES
Patient ambulates in [place] with wheeled walker, SBA of nurse. States she feels much stronger. No shakiness noted, some sob with exertion but tolerable per patient. Tolerates well. Returns to chair. Call light in reach,  at side.

## 2020-08-21 VITALS
HEIGHT: 65 IN | WEIGHT: 164.6 LBS | BODY MASS INDEX: 27.42 KG/M2 | TEMPERATURE: 97.8 F | SYSTOLIC BLOOD PRESSURE: 148 MMHG | DIASTOLIC BLOOD PRESSURE: 71 MMHG | HEART RATE: 68 BPM | RESPIRATION RATE: 18 BRPM | OXYGEN SATURATION: 97 %

## 2020-08-21 LAB
ABSOLUTE EOS #: <0.03 K/UL (ref 0–0.44)
ABSOLUTE IMMATURE GRANULOCYTE: 0.09 K/UL (ref 0–0.3)
ABSOLUTE LYMPH #: 0.86 K/UL (ref 1.1–3.7)
ABSOLUTE MONO #: 0.39 K/UL (ref 0.1–1.2)
ALBUMIN SERPL-MCNC: 3.3 G/DL (ref 3.5–5.2)
ALBUMIN/GLOBULIN RATIO: 1.4 (ref 1–2.5)
ALP BLD-CCNC: 46 U/L (ref 35–104)
ALT SERPL-CCNC: 17 U/L (ref 5–33)
ANION GAP SERPL CALCULATED.3IONS-SCNC: 9 MMOL/L (ref 9–17)
AST SERPL-CCNC: 12 U/L
BASOPHILS # BLD: 0 % (ref 0–2)
BASOPHILS ABSOLUTE: <0.03 K/UL (ref 0–0.2)
BILIRUB SERPL-MCNC: 0.16 MG/DL (ref 0.3–1.2)
BUN BLDV-MCNC: 18 MG/DL (ref 8–23)
BUN/CREAT BLD: 38 (ref 9–20)
CALCIUM SERPL-MCNC: 8.9 MG/DL (ref 8.6–10.4)
CHLORIDE BLD-SCNC: 102 MMOL/L (ref 98–107)
CO2: 30 MMOL/L (ref 20–31)
CREAT SERPL-MCNC: 0.47 MG/DL (ref 0.5–0.9)
DIFFERENTIAL TYPE: ABNORMAL
EOSINOPHILS RELATIVE PERCENT: 0 % (ref 1–4)
GFR AFRICAN AMERICAN: >60 ML/MIN
GFR NON-AFRICAN AMERICAN: >60 ML/MIN
GFR SERPL CREATININE-BSD FRML MDRD: ABNORMAL ML/MIN/{1.73_M2}
GFR SERPL CREATININE-BSD FRML MDRD: ABNORMAL ML/MIN/{1.73_M2}
GLUCOSE BLD-MCNC: 108 MG/DL (ref 70–99)
HCT VFR BLD CALC: 36.9 % (ref 36.3–47.1)
HEMOGLOBIN: 11.8 G/DL (ref 11.9–15.1)
IMMATURE GRANULOCYTES: 2 %
LYMPHOCYTES # BLD: 17 % (ref 24–43)
MAGNESIUM: 2.3 MG/DL (ref 1.6–2.6)
MCH RBC QN AUTO: 29.9 PG (ref 25.2–33.5)
MCHC RBC AUTO-ENTMCNC: 32 G/DL (ref 28.4–34.8)
MCV RBC AUTO: 93.7 FL (ref 82.6–102.9)
MONOCYTES # BLD: 8 % (ref 3–12)
NRBC AUTOMATED: 0 PER 100 WBC
PDW BLD-RTO: 15.8 % (ref 11.8–14.4)
PLATELET # BLD: 226 K/UL (ref 138–453)
PLATELET ESTIMATE: ABNORMAL
PMV BLD AUTO: 10 FL (ref 8.1–13.5)
POTASSIUM SERPL-SCNC: 3.2 MMOL/L (ref 3.7–5.3)
RBC # BLD: 3.94 M/UL (ref 3.95–5.11)
RBC # BLD: ABNORMAL 10*6/UL
SEG NEUTROPHILS: 74 % (ref 36–65)
SEGMENTED NEUTROPHILS ABSOLUTE COUNT: 3.77 K/UL (ref 1.5–8.1)
SODIUM BLD-SCNC: 141 MMOL/L (ref 135–144)
TOTAL PROTEIN: 5.6 G/DL (ref 6.4–8.3)
WBC # BLD: 5.1 K/UL (ref 3.5–11.3)
WBC # BLD: ABNORMAL 10*3/UL

## 2020-08-21 PROCEDURE — 6370000000 HC RX 637 (ALT 250 FOR IP): Performed by: NURSE PRACTITIONER

## 2020-08-21 PROCEDURE — 97530 THERAPEUTIC ACTIVITIES: CPT

## 2020-08-21 PROCEDURE — 97110 THERAPEUTIC EXERCISES: CPT

## 2020-08-21 PROCEDURE — 94664 DEMO&/EVAL PT USE INHALER: CPT

## 2020-08-21 PROCEDURE — 36415 COLL VENOUS BLD VENIPUNCTURE: CPT

## 2020-08-21 PROCEDURE — 94640 AIRWAY INHALATION TREATMENT: CPT

## 2020-08-21 PROCEDURE — 85025 COMPLETE CBC W/AUTO DIFF WBC: CPT

## 2020-08-21 PROCEDURE — 83735 ASSAY OF MAGNESIUM: CPT

## 2020-08-21 PROCEDURE — 2580000003 HC RX 258: Performed by: NURSE PRACTITIONER

## 2020-08-21 PROCEDURE — 80053 COMPREHEN METABOLIC PANEL: CPT

## 2020-08-21 PROCEDURE — 6360000002 HC RX W HCPCS: Performed by: NURSE PRACTITIONER

## 2020-08-21 PROCEDURE — 97535 SELF CARE MNGMENT TRAINING: CPT

## 2020-08-21 PROCEDURE — 97116 GAIT TRAINING THERAPY: CPT

## 2020-08-21 RX ORDER — POTASSIUM CHLORIDE 20 MEQ/1
20 TABLET, EXTENDED RELEASE ORAL ONCE
Status: COMPLETED | OUTPATIENT
Start: 2020-08-21 | End: 2020-08-21

## 2020-08-21 RX ORDER — CEPHALEXIN 500 MG/1
500 CAPSULE ORAL 2 TIMES DAILY
Qty: 20 CAPSULE | Refills: 0 | Status: SHIPPED | OUTPATIENT
Start: 2020-08-21 | End: 2020-08-31

## 2020-08-21 RX ORDER — FUROSEMIDE 40 MG/1
40 TABLET ORAL PRN
Qty: 10 TABLET | Refills: 0 | Status: ON HOLD | OUTPATIENT
Start: 2020-08-21 | End: 2020-11-05 | Stop reason: HOSPADM

## 2020-08-21 RX ORDER — PREDNISONE 20 MG/1
20 TABLET ORAL 2 TIMES DAILY
Qty: 10 TABLET | Refills: 0 | Status: SHIPPED | OUTPATIENT
Start: 2020-08-21 | End: 2020-08-26

## 2020-08-21 RX ORDER — POTASSIUM CHLORIDE 20 MEQ/1
40 TABLET, EXTENDED RELEASE ORAL ONCE
Status: COMPLETED | OUTPATIENT
Start: 2020-08-21 | End: 2020-08-21

## 2020-08-21 RX ADMIN — LEVOTHYROXINE SODIUM 150 MCG: 150 TABLET ORAL at 07:06

## 2020-08-21 RX ADMIN — POTASSIUM CHLORIDE 20 MEQ: 1500 TABLET, EXTENDED RELEASE ORAL at 09:07

## 2020-08-21 RX ADMIN — ASPIRIN 81 MG 81 MG: 81 TABLET ORAL at 08:10

## 2020-08-21 RX ADMIN — SODIUM CHLORIDE, PRESERVATIVE FREE 10 ML: 5 INJECTION INTRAVENOUS at 08:10

## 2020-08-21 RX ADMIN — DULOXETINE HYDROCHLORIDE 60 MG: 60 CAPSULE, DELAYED RELEASE ORAL at 08:10

## 2020-08-21 RX ADMIN — POLYETHYLENE GLYCOL 3350 17 G: 17 POWDER, FOR SOLUTION ORAL at 08:10

## 2020-08-21 RX ADMIN — POTASSIUM CHLORIDE 40 MEQ: 1500 TABLET, EXTENDED RELEASE ORAL at 08:52

## 2020-08-21 RX ADMIN — HYDROCODONE BITARTRATE AND ACETAMINOPHEN 1 TABLET: 10; 325 TABLET ORAL at 09:38

## 2020-08-21 RX ADMIN — PREGABALIN 300 MG: 75 CAPSULE ORAL at 08:10

## 2020-08-21 RX ADMIN — PANTOPRAZOLE SODIUM 40 MG: 40 TABLET, DELAYED RELEASE ORAL at 07:06

## 2020-08-21 RX ADMIN — IPRATROPIUM BROMIDE AND ALBUTEROL SULFATE 1 AMPULE: .5; 3 SOLUTION RESPIRATORY (INHALATION) at 06:01

## 2020-08-21 RX ADMIN — METHYLPREDNISOLONE SODIUM SUCCINATE 60 MG: 125 INJECTION, POWDER, FOR SOLUTION INTRAMUSCULAR; INTRAVENOUS at 08:10

## 2020-08-21 ASSESSMENT — PAIN DESCRIPTION - ORIENTATION
ORIENTATION: MID;LOWER
ORIENTATION: MID;LOWER

## 2020-08-21 ASSESSMENT — PAIN DESCRIPTION - DESCRIPTORS
DESCRIPTORS: ACHING
DESCRIPTORS: ACHING

## 2020-08-21 ASSESSMENT — PAIN SCALES - GENERAL
PAINLEVEL_OUTOF10: 6
PAINLEVEL_OUTOF10: 6
PAINLEVEL_OUTOF10: 7

## 2020-08-21 ASSESSMENT — PAIN DESCRIPTION - FREQUENCY
FREQUENCY: CONTINUOUS
FREQUENCY: CONTINUOUS

## 2020-08-21 ASSESSMENT — PAIN DESCRIPTION - PAIN TYPE
TYPE: CHRONIC PAIN
TYPE: CHRONIC PAIN

## 2020-08-21 ASSESSMENT — PAIN DESCRIPTION - LOCATION
LOCATION: BACK
LOCATION: BACK

## 2020-08-21 ASSESSMENT — PAIN DESCRIPTION - ONSET: ONSET: ON-GOING

## 2020-08-21 ASSESSMENT — PAIN DESCRIPTION - PROGRESSION: CLINICAL_PROGRESSION: NOT CHANGED

## 2020-08-21 NOTE — PROGRESS NOTES
Discharge instructions reviewed with patient and family at this time. Patient and family verbalize understanding, deny questions at this time. Will escort patient to private car shortly.

## 2020-08-21 NOTE — PROGRESS NOTES
Occupational Therapy  Facility/Department: [facility] AT THE [facility] MED SURG  Daily Treatment Note  NAME: Syd Pinzon  : 1945  MRN: [id number]    Date of Service: 2020    Discharge Recommendations:  Continue to assess pending progress       Assessment      OT Education: Energy Conservation  Patient Education: Energy conservation techniques during ADL performance. Safety Devices  Type of devices: Left in chair;Call light within reach;Nurse notified         Patient Diagnosis(es): The primary encounter diagnosis was Altered mental status, unspecified altered mental status type. Diagnoses of Community acquired pneumonia of right lung, unspecified part of lung and Septicemia ([facility] [address] were also pertinent to this visit. has a past medical history of COPD (chronic obstructive pulmonary disease) ([facility] [address] Depression, GERD (gastroesophageal reflux disease), and Osteoporosis. has a past surgical history that includes Hysterectomy; back surgery; Breast surgery; Carpal tunnel release; joint replacement; joint replacement; fracture surgery (Left, 2018); and Wrist fracture surgery (Left, 2018). Restrictions  Restrictions/Precautions  Restrictions/Precautions: General Precautions, Fall Risk  Subjective   General  Chart Reviewed: Yes  Patient assessed for rehabilitation services?: Yes  Response to previous treatment: Patient with no complaints from previous session  Family / Caregiver Present: Yes  Referring Practitioner: Dr. Colby Ganser  Diagnosis: Pneumonia  Subjective  Subjective: Pt c/o 7/10 chronic back pain. General Comment  Comments: Pt seated in bedside chair upon arrival.  Discharge order in place. Agreeable to ADLs assessment prior to discharge. Orientation     Objective    ADL  Grooming: Supervision  UE Bathing: Supervision  LE Bathing: Supervision  UE Dressing: Supervision  LE Dressing: Supervision  Additional Comments: Discharge summary in place. Pt agreeable to ADLs prior to discharge.   Pt performed

## 2020-08-21 NOTE — PROGRESS NOTES
Reviewed    ASSESSMENT:   Principal Problem:    Sepsis due to pneumonia [facility])  Active Problems:    Pneumonia of right upper lobe due to infectious organism ([facility] [address]    Lactic acidosis    Septic shock ([facility] [address]  Resolved Problems:    * No resolved hospital problems.  *        PLAN:  · Discharge home today  · Keflex x 10 days  · Prednisone for 5 days  · Klor con 40 meq x 1 today      RYAN Sneed, NP-C

## 2020-08-21 NOTE — DISCHARGE SUMMARY
significant lesions    Significant Diagnostic Studies:   Lab Results   Component Value Date    WBC 5.1 08/21/2020    HGB 11.8 (L) 08/21/2020     08/21/2020       Lab Results   Component Value Date    BUN 18 08/21/2020    CREATININE 0.47 (L) 08/21/2020     08/21/2020    K 3.2 (L) 08/21/2020    CALCIUM 8.9 08/21/2020     08/21/2020    CO2 30 08/21/2020    LABGLOM >60 08/21/2020       Lab Results   Component Value Date    WBCUA 0 TO 2 08/28/2019    RBCUA 0 TO 2 08/28/2019    EPITHUA 0 TO 2 08/28/2019    LEUKOCYTESUR NEGATIVE 08/16/2020    SPECGRAV 1.010 08/16/2020    GLUCOSEU NEGATIVE 08/16/2020    KETUA NEGATIVE 08/16/2020    PROTEINU NEGATIVE 08/16/2020    HGBUR NEGATIVE 08/16/2020    CASTUA NOT REPORTED 08/28/2019    CRYSTUA NOT REPORTED 08/28/2019    BACTERIA 1+ (A) 08/28/2019    YEAST NOT REPORTED 08/28/2019       Xr Chest Portable    Result Date: 8/16/2020  EXAMINATION: ONE XRAY VIEW OF THE CHEST 8/16/2020 9:30 am COMPARISON: July 9, 2019 HISTORY: ORDERING SYSTEM PROVIDED HISTORY: hypoxia fever ms change TECHNOLOGIST PROVIDED HISTORY: hypoxia fever ms change FINDINGS: Left shoulder arthroplasty. Increased right upper lobe and right lower lobe opacities. Chronic interstitial lung changes. Cardiomegaly. Increased right lung opacities may represent pneumonia. Recommend follow-up. Assessment and Plan:  Patient Active Problem List    Diagnosis Date Noted    Acute metabolic encephalopathy 62/18/8904     Priority: High     Class: Acute    Sepsis due to pneumonia ([address] 08/16/2020    Lactic acidosis 08/16/2020    Septic shock ([address] 08/16/2020    Status post surgery 12/20/2018    Pneumonia of right upper lobe due to infectious organism ([address] 10/05/2018    Hypothyroidism 10/05/2018    Major depressive disorder 10/05/2018    Chronic midline low back pain without sciatica 10/05/2018    Iron deficiency anemia 10/05/2018        Discharge Medications:        [address] [facility] Medication Instructions SOW:[id number]    Printed on:08/21/20 0422   Medication Information                      albuterol sulfate  (90 Base) MCG/ACT inhaler  Inhale 2 puffs into the lungs 4 times daily             alendronate (FOSAMAX) 70 MG tablet  Take 70 mg by mouth every 7 days             aspirin 81 MG tablet  Take 81 mg by mouth daily             calcium citrate-vitamin D (CITRICAL + D) 315-250 MG-UNIT TABS per tablet  Take 1 tablet by mouth 2 times daily (with meals)             cephALEXin (KEFLEX) 500 MG capsule  Take 1 capsule by mouth 2 times daily for 10 days             DULoxetine (CYMBALTA) 60 MG extended release capsule  Take 60 mg by mouth daily             furosemide (LASIX) 40 MG tablet  Take 1 tablet by mouth as needed (edema)             HYDROcodone-acetaminophen (NORCO)  MG per tablet  Take 1 tablet by mouth every 8 hours as needed for Pain. Singletary Quince Hydroxychloroquine Sulfate (PLAQUENIL PO)  Take by mouth             levothyroxine (SYNTHROID) 150 MCG tablet  Take 150 mcg by mouth Daily             NONFORMULARY               oxyCODONE-acetaminophen (PERCOCET) 5-325 MG per tablet  Take 1 tablet by mouth every 4 hours as needed for Pain.             pantoprazole sodium (PROTONIX) 40 MG PACK packet  Take 40 mg by mouth 2 times daily (before meals)             potassium chloride (KLOR-CON) 20 MEQ packet  Take 20 mEq by mouth 2 times daily             predniSONE (DELTASONE) 20 MG tablet  Take 1 tablet by mouth 2 times daily for 5 days             pregabalin (LYRICA) 100 MG capsule  Take 300 mg by mouth 2 times daily. .             traZODone (DESYREL) 100 MG tablet  Take 200 mg by mouth nightly                  Patient Instructions:    Activity: activity as tolerated  Diet: encourage fluids  Wound Care: none needed  Other: None     Disposition:   Discharge to Home    Follow up:  Patient will be followed by Porfirio Morales MD in 1-2 weeks    CORE MEASURES on Discharge (if applicable)  ACE/ARB in CHF: NA  Statin in MI: NA  ASA in MI: NA  Statin in CVA: NA  Antiplatelet in CVA: NA    Total time spent on discharge services: 45 minutes    Including the following activities:  Evaluation and Management of patient  Discussion with patient and/or surrogate about current care plan  Coordination with Case Management and/or   Coordination of care with Consultants (if applicable)   Coordination of care with Receiving Facility Physician (if applicable)  Completion of DME forms (if applicable)  Preparation of Discharge Summary  Preparation of Medication Reconciliation  Preparation of Discharge Prescriptions    Signed:  RYAN Tamayo, NP-C  8/21/2020, 8:31 AM

## 2020-08-21 NOTE — PROGRESS NOTES
Physical Therapy  Facility/Department: [facility] AT THE [facility] MED SURG  Daily Treatment Note  NAME: Chriss De Luna  : 1945  MRN: [id number]    Date of Service: 2020    Discharge Recommendations:  Continue to assess pending progress        Assessment      PT Education: Gait Training;Transfer Training  Patient Education: Educated pt on above with good understanding noted. REQUIRES PT FOLLOW UP: Yes  Activity Tolerance  Activity Tolerance: Patient Tolerated treatment well     Patient Diagnosis(es): The primary encounter diagnosis was Altered mental status, unspecified altered mental status type. Diagnoses of Community acquired pneumonia of right lung, unspecified part of lung and Septicemia ([facility] [address] were also pertinent to this visit. has a past medical history of COPD (chronic obstructive pulmonary disease) ([facility] [address] Depression, GERD (gastroesophageal reflux disease), and Osteoporosis. has a past surgical history that includes Hysterectomy; back surgery; Breast surgery; Carpal tunnel release; joint replacement; joint replacement; fracture surgery (Left, 2018); and Wrist fracture surgery (Left, 2018). Restrictions  Restrictions/Precautions  Restrictions/Precautions: General Precautions, Fall Risk  Subjective   General  Chart Reviewed: Yes  Response To Previous Treatment: Patient with no complaints from previous session. Family / Caregiver Present: Yes  Referring Practitioner: Dr. Vallejo Ra: Pt denies any pain. Orientation  Orientation  Overall Orientation Status: Within Functional Limits  Cognition      Objective      Transfers  Sit to Stand: Contact guard assistance;Stand by assistance  Stand to sit: Contact guard assistance;Stand by assistance  Comment: Minimal cues for hand placement with good understanding noted.   Ambulation  Ambulation?: Yes  WB Status: unrestricted  Ambulation 1  Surface: level tile  Device: Rolling Walker  Assistance: Contact guard assistance;Stand by

## 2020-08-21 NOTE — PROGRESS NOTES
RESPIRATORY ASSESSMENT PROTOCOL                                                                                              Patient Name: Philipp Monroy Room#: 1507/[id number] : 1945     Admitting diagnosis: Sepsis due to pneumonia ([place] [address] [J18.9, A41.9]       Medical History:   Past Medical History:   Diagnosis Date    COPD (chronic obstructive pulmonary disease) ([place] [address]     Depression     GERD (gastroesophageal reflux disease)     Osteoporosis        PATIENT ASSESSMENT    LABORATORY DATA  Hematology:   Lab Results   Component Value Date    WBC 6.8 2020    RBC 4.00 2020    HGB 12.0 2020    HCT 36.8 2020     2020     Chemistry:    Lab Results   Component Value Date    PHART 7.443 2020    YGP5NET 43.1 2020    PO2ART 62.3 2020    G9IPTLFJ 92.6 2020    KUA7TOX 28.8 2020    PBEA 4.2 2020       Blood Culture:   Sputum Culture:     VITALS  Pulse: 55   Resp: 18  BP: (!) 153/70  SpO2: 94 % O2 Device: None (Room air)  Temp: 97 °F (36.1 °C)  Comment:     SKIN COLOR  [x] Normal  [] Pale  [] Dusky  [] Cyanotic      RESPIRATORY PATTERN  [] Normal  [x] Dyspnea  [] Cheyne-Granger  [] Kussmaul  [] Biots    AMBULATORY  [] Yes  [] No  [x] With Assistance    PEAK FLOW  Predicted:     Personal Best:        VITAL CAPACITY  Predicted value:  ml  Actual Value:  ml  30% of Predicted:  Ml    Patient Acuity 0 1 2 3 4 Score   Level of Concious (LOC) [x]  Alert & Oriented or Pt normal LOC []  Confused;follows directions []  Confused & uncooper-ative []  Obtunded []  Comatose 0   Respiratory Rate  (RR) []  Reg. rate & pattern. 12 - 20 bpm  []  Increased RR.  Greater than 20 bpm   [x]  SOB w/ exertion or RR greater than 24 bpm []  Access- ory muscle use at rest. Abn.  resp. []  SOB at rest.   2   Bilateral Breath Sounds (BBS) []  Clear [x]  Diminish-ed bases  []  Diminish-ed t/o, or rales   []  Sporadic, scattered wheezes or rhonchi []  Persistentwheezes and, or absent BBS 1   Cough [x]  Strong, effective, & non-prod. []  Effective & prod. Less than 25 ml (2 TBSP) over past 24 hrs []  Ineffective & non-prod to less than 25 ML over past 24 hrs []  Ineffective and, or greater than 25 ml sputum prod. past 24 hrs. []  Nonspon- taneous; Requires suctioning 0   Pulmonary History  (PULM HX) []  No smoking and no chronic pulmonaryhistory []  Former smoker. Quit over 12 mos. ago []  Current smoker or quit w/ in 12 mos []  Pulm. History and, or 20 pk/yr smoking hx [x]  Admitted w/ acute pulm. dx and, or has been admitted w/ pulm. dx 2 or more times over past 12 mos 4   Surgical History this Admit  (SURG HX) [x]  No surgery []  General surgery []  Lower abdominal []  Thoracic or upper abdominal   []  Thoracic w/ pulm. disease 0   Chest X-Ray (CXR)/CT Scan []  Clear or not applicable []  Not available []  Atelect- asis or pleural effusions [x]  Localized infiltrate or pulm. edema []  Con-solidated Infiltrates, bilateral, or in more than 1 lobe 3   Slow or Forced VC, FEV1 OR PEFR (PULM FXN)  [x]  80% or greater, or not indicated []  Pt. unable to perform []  FEV1 or PEFR or VC 51-79%. []  FEV1 or PEFR or VC  30-49%   []  FEV1 or PEFR or VC less than 30%   0   TOTAL ACUITY: 10       CARE PLAN    If Acuity Level is 2, 3, or 4 in any of the following:    [] BILATERAL BREATH SOUNDS (BBS)     [x] PULMONARY HISTORY (PULM HX)  [] PULMONARY FUNCTION (PULM FX)    Goal: Improve respiratory functions in patients with airway disease and decrease WOB    [x] AEROSOL PROTOCOL    Total Acuity:   16-32  []  Secondary Assessment in 24 hrs Total Acuity:  9-15  [x]  Secondary Assessment in 24 hrs Total Acuity:  4-8  []  Secondary Assessment in 48 hrs Total Acuity:  0-3  []  Secondary Assessment in 72 hrs   HHN AEROSOL THERAPY with  [physician-ordered bronchodilator(s)] q 4 & Albuterol PRN q2 hrs. Breath-Actuated Neb if BBS Acuity = 4, and pt. can use MP. Notify physician if condition deteriorates.   HHN AEROSOL THERAPY with  [physician-ordered bronchodilator(s)]  QID and Albuterol PRN q4 hrs. Breath-Actuated Neb if BBS Acuity = 4, and pt. can use MP. Notify physician if condition deteriorates. MDI THERAPY with  2 actuations of [physician-ordered bronchodilator(s)] via spacer TID Albuterol and PRNq4 hrs. If unable to utilize MDI: HHN [physician-ordered bronchodilator(s)] TID and Albuterol PRN q4 hrs. Notify physician if condition deteriorates. MDI THERAPY with  [physician-ordered bronchodilator(s)] via spacer TID PRN. If unable to utilize MDI: HHN [physician-ordered bronchodilator(s)] TID PRN. Notify physician if condition deteriorates. If Acuity Level is 2, 3, or 4 in any of the following:    [] COUGH     [] SURGICAL HISTORY (SURG HX)  [x] CHEST XRAY (CXR)    Goal: Improvement in sputum mobilization in patients with ineffective airway clearance. Reverse atelectasis. [x] Bronchopulmonary Hygiene Protocol    Total Acuity:   16-32  []  Secondary Assessment in 24 hrs Total Acuity:  9-15  [x]  Secondary Assessment in 24 hrs Total Acuity:  4-8  []  Secondary Assessment in 48 hrs Total Acuity:  0-3  []  Secondary Assessment in 72 hrs   METANEB QID with [physician-ordered bronchodilator(s)] if CXR Acuity = 4; otherwise:  PD&P, PEP, or Vest QID & PRN  NT Sxn PRN for ineffective cough  METANEB QID with [physician-ordered bronchodilator(s)] if CXR Acuity = 4; otherwise:  PD&P, PEP, or Vest TID & PRN  NT Sxn PRN for ineffective cough  Instruct patient to self-perform IS q1hr WA   Directed Cough self-performed q1hr WA     If Acuity Level is 2 or above in the following:    [] PULMONARY HISTORY (PULM HX)    Goal: Assist patient in quitting smoking to slow or stop the progression of lung disease.     [] Smoking Cessation Protocol    SMOKING CESSATION EDUCATION provided according to policy GU_387: (marlyn with an X)  ____Yes    ____ No     ____ NA    Smoking Cessation Booklet given:  ____Yes  ____No ____Patient Refused

## 2020-08-21 NOTE — PLAN OF CARE
Problem: Discharge Planning:  Goal: Discharged to appropriate level of care  Description: Discharged to appropriate level of care  8/21/2020 0149 by Galilea Moy RN  Outcome: Ongoing     Problem: Gas Exchange - Impaired:  Goal: Levels of oxygenation will improve  Description: Levels of oxygenation will improve  8/21/2020 0149 by Galilea Moy RN  Outcome: Ongoing     Problem: Infection, Septic Shock:  Goal: Will show no infection signs and symptoms  Description: Will show no infection signs and symptoms  8/21/2020 0149 by Galilea Moy RN  Outcome: Ongoing     Problem: Venous Thromboembolism:  Goal: Will show no signs or symptoms of venous thromboembolism  Description: Will show no signs or symptoms of venous thromboembolism  8/21/2020 0149 by Galilea Moy RN  Outcome: Ongoing     Problem: Venous Thromboembolism:  Goal: Absence of signs or symptoms of impaired coagulation  Description: Absence of signs or symptoms of impaired coagulation  Outcome: Ongoing     Problem: Skin Integrity:  Goal: Will show no infection signs and symptoms  Description: Will show no infection signs and symptoms  8/21/2020 0149 by Galilea Moy RN  Outcome: Ongoing     Problem: Skin Integrity:  Goal: Absence of new skin breakdown  Description: Absence of new skin breakdown  Outcome: Ongoing     Problem: Falls - Risk of:  Goal: Will remain free from falls  Description: Will remain free from falls  8/21/2020 0149 by Galilea Moy RN  Outcome: Ongoing     Problem: Falls - Risk of:  Goal: Absence of physical injury  Description: Absence of physical injury  Outcome: Ongoing     Problem: Nutrition  Goal: Optimal nutrition therapy  Outcome: Ongoing

## 2020-08-21 NOTE — PROGRESS NOTES
Patient assessment and vitals completed at this time. Patient had chronic pain. Patient is going to walk in the magdaleno with nurses aide. She is now getting up with stand by assist and use of walker.  Patient denies any further needs at this time

## 2020-08-21 NOTE — PROGRESS NOTES
[facility] [facility]              Inpatient Medication Education Note                                 Patient admitted for sepsis due to PNA. Medications reviewed with the patient include:  cephalexin (patient education monograph provided), prednisone (patient education monograph provided), furosemide (patient education monograph provided). Patient education provided when necessary to include potential medication related side effects with acknowledgement of understanding. Ravi Horn.  Maira Cortes, 8/21/2020, 10:48 AM

## 2020-08-22 LAB
CULTURE: NORMAL
CULTURE: NORMAL
Lab: NORMAL
Lab: NORMAL
SPECIMEN DESCRIPTION: NORMAL
SPECIMEN DESCRIPTION: NORMAL

## 2020-10-22 ENCOUNTER — HOSPITAL ENCOUNTER (INPATIENT)
Age: 75
LOS: 15 days | Discharge: NURSING, REHAB OR LONG-TERM CARE | DRG: 870 | End: 2020-11-06
Attending: INTERNAL MEDICINE | Admitting: INTERNAL MEDICINE
Payer: MEDICARE

## 2020-10-22 ENCOUNTER — APPOINTMENT (OUTPATIENT)
Dept: GENERAL RADIOLOGY | Age: 75
DRG: 870 | End: 2020-10-22
Attending: INTERNAL MEDICINE
Payer: MEDICARE

## 2020-10-22 ENCOUNTER — HOSPITAL ENCOUNTER (EMERGENCY)
Age: 75
Discharge: ANOTHER HOSPITAL | End: 2020-10-22
Attending: EMERGENCY MEDICINE
Payer: MEDICARE

## 2020-10-22 ENCOUNTER — APPOINTMENT (OUTPATIENT)
Dept: GENERAL RADIOLOGY | Age: 75
End: 2020-10-22
Payer: MEDICARE

## 2020-10-22 ENCOUNTER — APPOINTMENT (OUTPATIENT)
Dept: CT IMAGING | Age: 75
End: 2020-10-22
Payer: MEDICARE

## 2020-10-22 VITALS
HEART RATE: 99 BPM | OXYGEN SATURATION: 99 % | DIASTOLIC BLOOD PRESSURE: 46 MMHG | RESPIRATION RATE: 15 BRPM | WEIGHT: 164 LBS | SYSTOLIC BLOOD PRESSURE: 84 MMHG | TEMPERATURE: 99.5 F | BODY MASS INDEX: 27.29 KG/M2

## 2020-10-22 PROBLEM — J44.1 COPD EXACERBATION (HCC): Status: ACTIVE | Noted: 2020-10-22

## 2020-10-22 PROBLEM — A41.9 SEPSIS (HCC): Status: ACTIVE | Noted: 2020-10-22

## 2020-10-22 LAB
ABSOLUTE EOS #: 0 K/UL (ref 0–0.4)
ABSOLUTE EOS #: <0.03 K/UL (ref 0–0.44)
ABSOLUTE IMMATURE GRANULOCYTE: 0.29 K/UL (ref 0–0.3)
ABSOLUTE IMMATURE GRANULOCYTE: <0.03 K/UL (ref 0–0.3)
ABSOLUTE LYMPH #: 0.72 K/UL (ref 1–4.8)
ABSOLUTE LYMPH #: 1.19 K/UL (ref 1.1–3.7)
ABSOLUTE MONO #: 0.06 K/UL (ref 0.1–1.2)
ABSOLUTE MONO #: 0.36 K/UL (ref 0.1–0.8)
ALBUMIN SERPL-MCNC: 3.2 G/DL (ref 3.5–5.2)
ALBUMIN SERPL-MCNC: 4.1 G/DL (ref 3.5–5.2)
ALBUMIN/GLOBULIN RATIO: 1.4 (ref 1–2.5)
ALBUMIN/GLOBULIN RATIO: 1.5 (ref 1–2.5)
ALLEN TEST: ABNORMAL
ALLEN TEST: POSITIVE
ALP BLD-CCNC: 38 U/L (ref 35–104)
ALP BLD-CCNC: 51 U/L (ref 35–104)
ALT SERPL-CCNC: 12 U/L (ref 5–33)
ALT SERPL-CCNC: 15 U/L (ref 5–33)
ANION GAP SERPL CALCULATED.3IONS-SCNC: 12 MMOL/L (ref 9–17)
ANION GAP SERPL CALCULATED.3IONS-SCNC: 15 MMOL/L (ref 9–17)
AST SERPL-CCNC: 31 U/L
AST SERPL-CCNC: 34 U/L
BASOPHILS # BLD: 0 % (ref 0–2)
BASOPHILS # BLD: 1 % (ref 0–2)
BASOPHILS ABSOLUTE: 0 K/UL (ref 0–0.2)
BASOPHILS ABSOLUTE: <0.03 K/UL (ref 0–0.2)
BILIRUB SERPL-MCNC: 0.42 MG/DL (ref 0.3–1.2)
BILIRUB SERPL-MCNC: 0.43 MG/DL (ref 0.3–1.2)
BILIRUBIN URINE: NEGATIVE
BNP INTERPRETATION: ABNORMAL
BNP INTERPRETATION: NORMAL
BUN BLDV-MCNC: 19 MG/DL (ref 8–23)
BUN BLDV-MCNC: 20 MG/DL (ref 8–23)
BUN/CREAT BLD: 26 (ref 9–20)
BUN/CREAT BLD: ABNORMAL (ref 9–20)
CALCIUM SERPL-MCNC: 8.4 MG/DL (ref 8.6–10.4)
CALCIUM SERPL-MCNC: 8.7 MG/DL (ref 8.6–10.4)
CARBOXYHEMOGLOBIN: ABNORMAL % (ref 0–5)
CHLORIDE BLD-SCNC: 102 MMOL/L (ref 98–107)
CHLORIDE BLD-SCNC: 107 MMOL/L (ref 98–107)
CO2: 20 MMOL/L (ref 20–31)
CO2: 27 MMOL/L (ref 20–31)
COLOR: YELLOW
COMMENT UA: ABNORMAL
CREAT SERPL-MCNC: 0.62 MG/DL (ref 0.5–0.9)
CREAT SERPL-MCNC: 0.73 MG/DL (ref 0.5–0.9)
DIFFERENTIAL TYPE: ABNORMAL
DIFFERENTIAL TYPE: ABNORMAL
EKG ATRIAL RATE: 140 BPM
EKG P AXIS: 74 DEGREES
EKG P-R INTERVAL: 156 MS
EKG Q-T INTERVAL: 314 MS
EKG QRS DURATION: 76 MS
EKG QTC CALCULATION (BAZETT): 479 MS
EKG R AXIS: -25 DEGREES
EKG T AXIS: 76 DEGREES
EKG VENTRICULAR RATE: 140 BPM
EOSINOPHILS RELATIVE PERCENT: 0 % (ref 1–4)
EOSINOPHILS RELATIVE PERCENT: 0 % (ref 1–4)
FIO2: 40
FIO2: 80
GFR AFRICAN AMERICAN: >60 ML/MIN
GFR AFRICAN AMERICAN: >60 ML/MIN
GFR NON-AFRICAN AMERICAN: >60 ML/MIN
GFR NON-AFRICAN AMERICAN: >60 ML/MIN
GFR SERPL CREATININE-BSD FRML MDRD: ABNORMAL ML/MIN/{1.73_M2}
GLUCOSE BLD-MCNC: 107 MG/DL (ref 70–99)
GLUCOSE BLD-MCNC: 152 MG/DL (ref 70–99)
GLUCOSE URINE: NEGATIVE
HCO3 ARTERIAL: 25.2 MMOL/L (ref 22–26)
HCT VFR BLD CALC: 43.2 % (ref 36.3–47.1)
HCT VFR BLD CALC: 47.9 % (ref 36.3–47.1)
HEMOGLOBIN: 13.3 G/DL (ref 11.9–15.1)
HEMOGLOBIN: 15.1 G/DL (ref 11.9–15.1)
IMMATURE GRANULOCYTES: 0 %
IMMATURE GRANULOCYTES: 4 %
INR BLD: 1
KETONES, URINE: NEGATIVE
LACTIC ACID, SEPSIS WHOLE BLOOD: 6.2 MMOL/L (ref 0.5–1.9)
LACTIC ACID, SEPSIS WHOLE BLOOD: ABNORMAL MMOL/L (ref 0.5–1.9)
LACTIC ACID, SEPSIS WHOLE BLOOD: ABNORMAL MMOL/L (ref 0.5–1.9)
LACTIC ACID, SEPSIS: 4.6 MMOL/L (ref 0.5–1.9)
LACTIC ACID, SEPSIS: 5.1 MMOL/L (ref 0.5–1.9)
LACTIC ACID, SEPSIS: ABNORMAL MMOL/L (ref 0.5–1.9)
LACTIC ACID, WHOLE BLOOD: ABNORMAL MMOL/L (ref 0.7–2.1)
LACTIC ACID: 5 MMOL/L (ref 0.5–2.2)
LEUKOCYTE ESTERASE, URINE: NEGATIVE
LYMPHOCYTES # BLD: 10 % (ref 24–44)
LYMPHOCYTES # BLD: 36 % (ref 24–43)
MAGNESIUM: 2.2 MG/DL (ref 1.6–2.6)
MCH RBC QN AUTO: 30.5 PG (ref 25.2–33.5)
MCH RBC QN AUTO: 30.9 PG (ref 25.2–33.5)
MCHC RBC AUTO-ENTMCNC: 30.8 G/DL (ref 28.4–34.8)
MCHC RBC AUTO-ENTMCNC: 31.5 G/DL (ref 28.4–34.8)
MCV RBC AUTO: 100.5 FL (ref 82.6–102.9)
MCV RBC AUTO: 96.8 FL (ref 82.6–102.9)
METHEMOGLOBIN: ABNORMAL % (ref 0–1.9)
MODE: ABNORMAL
MODE: ABNORMAL
MONOCYTES # BLD: 2 % (ref 3–12)
MONOCYTES # BLD: 5 % (ref 1–7)
MORPHOLOGY: NORMAL
NEGATIVE BASE EXCESS, ART: 2.1 MMOL/L (ref 0–2)
NEGATIVE BASE EXCESS, ART: ABNORMAL (ref 0–2)
NITRITE, URINE: NEGATIVE
NOTIFICATION TIME: ABNORMAL
NOTIFICATION: ABNORMAL
NRBC AUTOMATED: 0 PER 100 WBC
NRBC AUTOMATED: 0 PER 100 WBC
O2 DEVICE/FLOW/%: ABNORMAL
O2 DEVICE/FLOW/%: ABNORMAL
O2 SAT, ARTERIAL: 95.3 % (ref 95–98)
OXYHEMOGLOBIN: ABNORMAL % (ref 95–98)
PARTIAL THROMBOPLASTIN TIME: 22.1 SEC (ref 23.9–33.8)
PATIENT TEMP: 37
PATIENT TEMP: ABNORMAL
PCO2 ARTERIAL: 52.8 MMHG (ref 35–45)
PCO2, ART, TEMP ADJ: ABNORMAL (ref 35–45)
PDW BLD-RTO: 13.4 % (ref 11.8–14.4)
PDW BLD-RTO: 13.8 % (ref 11.8–14.4)
PEEP/CPAP: ABNORMAL
PH ARTERIAL: 7.3 (ref 7.35–7.45)
PH UA: 6 (ref 5–9)
PH, ART, TEMP ADJ: ABNORMAL (ref 7.35–7.45)
PLATELET # BLD: 317 K/UL (ref 138–453)
PLATELET # BLD: ABNORMAL K/UL (ref 138–453)
PLATELET ESTIMATE: ABNORMAL
PLATELET ESTIMATE: ABNORMAL
PLATELET, FLUORESCENCE: 213 K/UL (ref 138–453)
PLATELET, IMMATURE FRACTION: 3.6 % (ref 1.1–10.3)
PMV BLD AUTO: 9.9 FL (ref 8.1–13.5)
PMV BLD AUTO: ABNORMAL FL (ref 8.1–13.5)
PO2 ARTERIAL: 85 MMHG (ref 80–100)
PO2, ART, TEMP ADJ: ABNORMAL MMHG (ref 80–100)
POC HCO3: 26.9 MMOL/L (ref 21–28)
POC O2 SATURATION: 90 % (ref 94–98)
POC PCO2 TEMP: ABNORMAL MM HG
POC PCO2: 44.3 MM HG (ref 35–48)
POC PH TEMP: ABNORMAL
POC PH: 7.39 (ref 7.35–7.45)
POC PO2 TEMP: ABNORMAL MM HG
POC PO2: 60.5 MM HG (ref 83–108)
POSITIVE BASE EXCESS, ART: 2 (ref 0–3)
POSITIVE BASE EXCESS, ART: ABNORMAL MMOL/L (ref 0–2)
POTASSIUM SERPL-SCNC: 3 MMOL/L (ref 3.7–5.3)
POTASSIUM SERPL-SCNC: 3.4 MMOL/L (ref 3.7–5.3)
PRO-BNP: 1838 PG/ML
PRO-BNP: 189 PG/ML
PROCALCITONIN: 13.14 NG/ML
PROTEIN UA: NEGATIVE
PROTHROMBIN TIME: 12.7 SEC (ref 11.5–14.2)
PSV: ABNORMAL
PT. POSITION: ABNORMAL
RBC # BLD: 4.3 M/UL (ref 3.95–5.11)
RBC # BLD: 4.95 M/UL (ref 3.95–5.11)
RBC # BLD: ABNORMAL 10*6/UL
RBC # BLD: ABNORMAL 10*6/UL
RESPIRATORY RATE: 31
SAMPLE SITE: ABNORMAL
SAMPLE SITE: ABNORMAL
SARS-COV-2, RAPID: NOT DETECTED
SARS-COV-2: NORMAL
SARS-COV-2: NORMAL
SEG NEUTROPHILS: 61 % (ref 36–65)
SEG NEUTROPHILS: 81 % (ref 36–66)
SEGMENTED NEUTROPHILS ABSOLUTE COUNT: 2.07 K/UL (ref 1.5–8.1)
SEGMENTED NEUTROPHILS ABSOLUTE COUNT: 5.83 K/UL (ref 1.8–7.7)
SET RATE: ABNORMAL
SODIUM BLD-SCNC: 141 MMOL/L (ref 135–144)
SODIUM BLD-SCNC: 142 MMOL/L (ref 135–144)
SOURCE: NORMAL
SPECIFIC GRAVITY UA: >1.03 (ref 1.01–1.02)
TCO2 (CALC), ART: 28 MMOL/L (ref 22–29)
TEXT FOR RESPIRATORY: ABNORMAL
TOTAL HB: ABNORMAL G/DL (ref 12–16)
TOTAL PROTEIN: 5.5 G/DL (ref 6.4–8.3)
TOTAL PROTEIN: 6.9 G/DL (ref 6.4–8.3)
TOTAL RATE: ABNORMAL
TROPONIN INTERP: ABNORMAL
TROPONIN T: ABNORMAL NG/ML
TROPONIN, HIGH SENSITIVITY: 19 NG/L (ref 0–14)
TROPONIN, HIGH SENSITIVITY: 67 NG/L (ref 0–14)
TROPONIN, HIGH SENSITIVITY: 81 NG/L (ref 0–14)
TURBIDITY: CLEAR
URINE HGB: NEGATIVE
UROBILINOGEN, URINE: NORMAL
VT: ABNORMAL
WBC # BLD: 3.3 K/UL (ref 3.5–11.3)
WBC # BLD: 7.2 K/UL (ref 3.5–11.3)
WBC # BLD: ABNORMAL 10*3/UL
WBC # BLD: ABNORMAL 10*3/UL

## 2020-10-22 PROCEDURE — 84145 PROCALCITONIN (PCT): CPT

## 2020-10-22 PROCEDURE — 2580000003 HC RX 258: Performed by: STUDENT IN AN ORGANIZED HEALTH CARE EDUCATION/TRAINING PROGRAM

## 2020-10-22 PROCEDURE — 83880 ASSAY OF NATRIURETIC PEPTIDE: CPT

## 2020-10-22 PROCEDURE — 71045 X-RAY EXAM CHEST 1 VIEW: CPT

## 2020-10-22 PROCEDURE — 93005 ELECTROCARDIOGRAM TRACING: CPT | Performed by: EMERGENCY MEDICINE

## 2020-10-22 PROCEDURE — 71260 CT THORAX DX C+: CPT

## 2020-10-22 PROCEDURE — 87040 BLOOD CULTURE FOR BACTERIA: CPT

## 2020-10-22 PROCEDURE — 6360000002 HC RX W HCPCS

## 2020-10-22 PROCEDURE — 2500000003 HC RX 250 WO HCPCS

## 2020-10-22 PROCEDURE — 83735 ASSAY OF MAGNESIUM: CPT

## 2020-10-22 PROCEDURE — 94770 HC ETCO2 MONITOR DAILY: CPT

## 2020-10-22 PROCEDURE — 83605 ASSAY OF LACTIC ACID: CPT

## 2020-10-22 PROCEDURE — 85025 COMPLETE CBC W/AUTO DIFF WBC: CPT

## 2020-10-22 PROCEDURE — 74018 RADEX ABDOMEN 1 VIEW: CPT

## 2020-10-22 PROCEDURE — 94660 CPAP INITIATION&MGMT: CPT

## 2020-10-22 PROCEDURE — 36600 WITHDRAWAL OF ARTERIAL BLOOD: CPT

## 2020-10-22 PROCEDURE — 93005 ELECTROCARDIOGRAM TRACING: CPT | Performed by: STUDENT IN AN ORGANIZED HEALTH CARE EDUCATION/TRAINING PROGRAM

## 2020-10-22 PROCEDURE — U0002 COVID-19 LAB TEST NON-CDC: HCPCS

## 2020-10-22 PROCEDURE — 85730 THROMBOPLASTIN TIME PARTIAL: CPT

## 2020-10-22 PROCEDURE — 96375 TX/PRO/DX INJ NEW DRUG ADDON: CPT

## 2020-10-22 PROCEDURE — 36415 COLL VENOUS BLD VENIPUNCTURE: CPT

## 2020-10-22 PROCEDURE — 85055 RETICULATED PLATELET ASSAY: CPT

## 2020-10-22 PROCEDURE — 82803 BLOOD GASES ANY COMBINATION: CPT

## 2020-10-22 PROCEDURE — 94002 VENT MGMT INPAT INIT DAY: CPT

## 2020-10-22 PROCEDURE — 96365 THER/PROPH/DIAG IV INF INIT: CPT

## 2020-10-22 PROCEDURE — 85610 PROTHROMBIN TIME: CPT

## 2020-10-22 PROCEDURE — 84484 ASSAY OF TROPONIN QUANT: CPT

## 2020-10-22 PROCEDURE — 6360000002 HC RX W HCPCS: Performed by: STUDENT IN AN ORGANIZED HEALTH CARE EDUCATION/TRAINING PROGRAM

## 2020-10-22 PROCEDURE — 82805 BLOOD GASES W/O2 SATURATION: CPT

## 2020-10-22 PROCEDURE — 6360000002 HC RX W HCPCS: Performed by: EMERGENCY MEDICINE

## 2020-10-22 PROCEDURE — 6360000004 HC RX CONTRAST MEDICATION: Performed by: EMERGENCY MEDICINE

## 2020-10-22 PROCEDURE — 80053 COMPREHEN METABOLIC PANEL: CPT

## 2020-10-22 PROCEDURE — 2500000003 HC RX 250 WO HCPCS: Performed by: STUDENT IN AN ORGANIZED HEALTH CARE EDUCATION/TRAINING PROGRAM

## 2020-10-22 PROCEDURE — 94761 N-INVAS EAR/PLS OXIMETRY MLT: CPT

## 2020-10-22 PROCEDURE — 81003 URINALYSIS AUTO W/O SCOPE: CPT

## 2020-10-22 PROCEDURE — 2700000000 HC OXYGEN THERAPY PER DAY

## 2020-10-22 PROCEDURE — 6370000000 HC RX 637 (ALT 250 FOR IP)

## 2020-10-22 PROCEDURE — 2580000003 HC RX 258: Performed by: EMERGENCY MEDICINE

## 2020-10-22 PROCEDURE — 93010 ELECTROCARDIOGRAM REPORT: CPT | Performed by: INTERNAL MEDICINE

## 2020-10-22 PROCEDURE — 2000000000 HC ICU R&B

## 2020-10-22 PROCEDURE — 94664 DEMO&/EVAL PT USE INHALER: CPT

## 2020-10-22 PROCEDURE — 99285 EMERGENCY DEPT VISIT HI MDM: CPT

## 2020-10-22 RX ORDER — HYDROCODONE BITARTRATE AND ACETAMINOPHEN 5; 325 MG/1; MG/1
2 TABLET ORAL EVERY 8 HOURS PRN
Status: DISCONTINUED | OUTPATIENT
Start: 2020-10-22 | End: 2020-10-30

## 2020-10-22 RX ORDER — HYDROCODONE BITARTRATE AND ACETAMINOPHEN 10; 325 MG/1; MG/1
1 TABLET ORAL EVERY 8 HOURS PRN
Status: DISCONTINUED | OUTPATIENT
Start: 2020-10-22 | End: 2020-10-22

## 2020-10-22 RX ORDER — 0.9 % SODIUM CHLORIDE 0.9 %
1000 INTRAVENOUS SOLUTION INTRAVENOUS ONCE
Status: COMPLETED | OUTPATIENT
Start: 2020-10-22 | End: 2020-10-22

## 2020-10-22 RX ORDER — SODIUM CHLORIDE, SODIUM LACTATE, POTASSIUM CHLORIDE, AND CALCIUM CHLORIDE .6; .31; .03; .02 G/100ML; G/100ML; G/100ML; G/100ML
1000 INJECTION, SOLUTION INTRAVENOUS ONCE
Status: COMPLETED | OUTPATIENT
Start: 2020-10-22 | End: 2020-10-22

## 2020-10-22 RX ORDER — MIDAZOLAM HYDROCHLORIDE 1 MG/ML
INJECTION INTRAMUSCULAR; INTRAVENOUS
Status: COMPLETED
Start: 2020-10-22 | End: 2020-10-22

## 2020-10-22 RX ORDER — ALBUTEROL SULFATE 90 UG/1
2 AEROSOL, METERED RESPIRATORY (INHALATION) 4 TIMES DAILY
Status: DISCONTINUED | OUTPATIENT
Start: 2020-10-22 | End: 2020-10-22

## 2020-10-22 RX ORDER — DEXTROSE, SODIUM CHLORIDE, AND POTASSIUM CHLORIDE 5; .45; .15 G/100ML; G/100ML; G/100ML
INJECTION INTRAVENOUS CONTINUOUS
Status: DISCONTINUED | OUTPATIENT
Start: 2020-10-22 | End: 2020-10-25

## 2020-10-22 RX ORDER — ALBUTEROL SULFATE 90 UG/1
4 AEROSOL, METERED RESPIRATORY (INHALATION)
Status: DISCONTINUED | OUTPATIENT
Start: 2020-10-22 | End: 2020-10-23

## 2020-10-22 RX ORDER — PREGABALIN 100 MG/1
300 CAPSULE ORAL 2 TIMES DAILY
Status: DISCONTINUED | OUTPATIENT
Start: 2020-10-22 | End: 2020-11-06 | Stop reason: HOSPADM

## 2020-10-22 RX ORDER — ALBUTEROL SULFATE 2.5 MG/3ML
2.5 SOLUTION RESPIRATORY (INHALATION) 4 TIMES DAILY
Status: DISCONTINUED | OUTPATIENT
Start: 2020-10-22 | End: 2020-10-23

## 2020-10-22 RX ORDER — SODIUM CHLORIDE 0.9 % (FLUSH) 0.9 %
10 SYRINGE (ML) INJECTION EVERY 12 HOURS SCHEDULED
Status: DISCONTINUED | OUTPATIENT
Start: 2020-10-22 | End: 2020-10-22 | Stop reason: HOSPADM

## 2020-10-22 RX ORDER — MIDAZOLAM HYDROCHLORIDE 1 MG/ML
0.5 INJECTION INTRAMUSCULAR; INTRAVENOUS ONCE
Status: COMPLETED | OUTPATIENT
Start: 2020-10-22 | End: 2020-10-22

## 2020-10-22 RX ORDER — PROPOFOL 10 MG/ML
INJECTION, EMULSION INTRAVENOUS
Status: COMPLETED
Start: 2020-10-22 | End: 2020-10-23

## 2020-10-22 RX ORDER — FENTANYL CITRATE 50 UG/ML
50 INJECTION, SOLUTION INTRAMUSCULAR; INTRAVENOUS ONCE
Status: COMPLETED | OUTPATIENT
Start: 2020-10-22 | End: 2020-10-22

## 2020-10-22 RX ORDER — SODIUM CHLORIDE 0.9 % (FLUSH) 0.9 %
10 SYRINGE (ML) INJECTION EVERY 12 HOURS SCHEDULED
Status: DISCONTINUED | OUTPATIENT
Start: 2020-10-22 | End: 2020-11-06 | Stop reason: HOSPADM

## 2020-10-22 RX ORDER — SODIUM CHLORIDE, SODIUM LACTATE, POTASSIUM CHLORIDE, AND CALCIUM CHLORIDE .6; .31; .03; .02 G/100ML; G/100ML; G/100ML; G/100ML
500 INJECTION, SOLUTION INTRAVENOUS ONCE
Status: COMPLETED | OUTPATIENT
Start: 2020-10-22 | End: 2020-10-22

## 2020-10-22 RX ORDER — FENTANYL CITRATE 50 UG/ML
INJECTION, SOLUTION INTRAMUSCULAR; INTRAVENOUS
Status: COMPLETED
Start: 2020-10-22 | End: 2020-10-22

## 2020-10-22 RX ORDER — LEVOTHYROXINE SODIUM 0.07 MG/1
150 TABLET ORAL DAILY
Status: DISCONTINUED | OUTPATIENT
Start: 2020-10-23 | End: 2020-11-06 | Stop reason: HOSPADM

## 2020-10-22 RX ORDER — SODIUM CHLORIDE 0.9 % (FLUSH) 0.9 %
10 SYRINGE (ML) INJECTION PRN
Status: DISCONTINUED | OUTPATIENT
Start: 2020-10-22 | End: 2020-11-06 | Stop reason: HOSPADM

## 2020-10-22 RX ORDER — ACETAMINOPHEN 325 MG/1
650 TABLET ORAL EVERY 4 HOURS PRN
Status: DISCONTINUED | OUTPATIENT
Start: 2020-10-22 | End: 2020-10-26 | Stop reason: SDUPTHER

## 2020-10-22 RX ORDER — POTASSIUM CHLORIDE 20 MEQ/1
20 TABLET, EXTENDED RELEASE ORAL 2 TIMES DAILY
Status: DISCONTINUED | OUTPATIENT
Start: 2020-10-22 | End: 2020-10-23

## 2020-10-22 RX ORDER — POTASSIUM CHLORIDE 7.45 MG/ML
10 INJECTION INTRAVENOUS PRN
Status: DISCONTINUED | OUTPATIENT
Start: 2020-10-22 | End: 2020-10-25

## 2020-10-22 RX ORDER — ASPIRIN 81 MG/1
81 TABLET ORAL DAILY
Status: DISCONTINUED | OUTPATIENT
Start: 2020-10-23 | End: 2020-10-26

## 2020-10-22 RX ORDER — METHYLPREDNISOLONE SODIUM SUCCINATE 125 MG/2ML
125 INJECTION, POWDER, LYOPHILIZED, FOR SOLUTION INTRAMUSCULAR; INTRAVENOUS ONCE
Status: COMPLETED | OUTPATIENT
Start: 2020-10-22 | End: 2020-10-22

## 2020-10-22 RX ORDER — FUROSEMIDE 40 MG/1
40 TABLET ORAL DAILY PRN
Status: DISCONTINUED | OUTPATIENT
Start: 2020-10-22 | End: 2020-10-23

## 2020-10-22 RX ORDER — SODIUM CHLORIDE, SODIUM LACTATE, POTASSIUM CHLORIDE, CALCIUM CHLORIDE 600; 310; 30; 20 MG/100ML; MG/100ML; MG/100ML; MG/100ML
1000 INJECTION, SOLUTION INTRAVENOUS ONCE
Status: COMPLETED | OUTPATIENT
Start: 2020-10-22 | End: 2020-10-22

## 2020-10-22 RX ORDER — LORAZEPAM 2 MG/ML
1 INJECTION INTRAMUSCULAR ONCE
Status: COMPLETED | OUTPATIENT
Start: 2020-10-22 | End: 2020-10-22

## 2020-10-22 RX ORDER — SODIUM CHLORIDE 0.9 % (FLUSH) 0.9 %
10 SYRINGE (ML) INJECTION PRN
Status: DISCONTINUED | OUTPATIENT
Start: 2020-10-22 | End: 2020-10-22 | Stop reason: HOSPADM

## 2020-10-22 RX ORDER — PROPOFOL 10 MG/ML
10 INJECTION, EMULSION INTRAVENOUS
Status: DISCONTINUED | OUTPATIENT
Start: 2020-10-23 | End: 2020-10-28

## 2020-10-22 RX ORDER — ALENDRONATE SODIUM 70 MG/1
70 TABLET ORAL
Status: DISCONTINUED | OUTPATIENT
Start: 2020-10-22 | End: 2020-10-22

## 2020-10-22 RX ORDER — ONDANSETRON 2 MG/ML
4 INJECTION INTRAMUSCULAR; INTRAVENOUS EVERY 8 HOURS PRN
Status: DISCONTINUED | OUTPATIENT
Start: 2020-10-22 | End: 2020-11-06 | Stop reason: HOSPADM

## 2020-10-22 RX ORDER — IPRATROPIUM BROMIDE AND ALBUTEROL SULFATE 2.5; .5 MG/3ML; MG/3ML
SOLUTION RESPIRATORY (INHALATION)
Status: COMPLETED
Start: 2020-10-22 | End: 2020-10-22

## 2020-10-22 RX ADMIN — IOPAMIDOL 75 ML: 755 INJECTION, SOLUTION INTRAVENOUS at 14:56

## 2020-10-22 RX ADMIN — FENTANYL CITRATE 50 MCG: 50 INJECTION, SOLUTION INTRAMUSCULAR; INTRAVENOUS at 23:19

## 2020-10-22 RX ADMIN — SODIUM CHLORIDE, POTASSIUM CHLORIDE, SODIUM LACTATE AND CALCIUM CHLORIDE 1000 ML: 600; 310; 30; 20 INJECTION, SOLUTION INTRAVENOUS at 09:06

## 2020-10-22 RX ADMIN — METHYLPREDNISOLONE SODIUM SUCCINATE 125 MG: 125 INJECTION, POWDER, FOR SOLUTION INTRAMUSCULAR; INTRAVENOUS at 08:34

## 2020-10-22 RX ADMIN — SODIUM CHLORIDE, POTASSIUM CHLORIDE, SODIUM LACTATE AND CALCIUM CHLORIDE 1000 ML: 600; 310; 30; 20 INJECTION, SOLUTION INTRAVENOUS at 20:04

## 2020-10-22 RX ADMIN — SODIUM CHLORIDE 1000 ML: 9 INJECTION, SOLUTION INTRAVENOUS at 08:36

## 2020-10-22 RX ADMIN — AZITHROMYCIN MONOHYDRATE 500 MG: 500 INJECTION, POWDER, LYOPHILIZED, FOR SOLUTION INTRAVENOUS at 08:43

## 2020-10-22 RX ADMIN — LORAZEPAM 1 MG: 2 INJECTION INTRAMUSCULAR; INTRAVENOUS at 08:58

## 2020-10-22 RX ADMIN — POTASSIUM CHLORIDE, DEXTROSE MONOHYDRATE AND SODIUM CHLORIDE: 150; 5; 450 INJECTION, SOLUTION INTRAVENOUS at 21:12

## 2020-10-22 RX ADMIN — SODIUM CHLORIDE, PRESERVATIVE FREE 10 ML: 5 INJECTION INTRAVENOUS at 20:04

## 2020-10-22 RX ADMIN — IPRATROPIUM BROMIDE AND ALBUTEROL SULFATE 3 ML: .5; 3 SOLUTION RESPIRATORY (INHALATION) at 08:43

## 2020-10-22 RX ADMIN — SODIUM CHLORIDE, POTASSIUM CHLORIDE, SODIUM LACTATE AND CALCIUM CHLORIDE 500 ML: 600; 310; 30; 20 INJECTION, SOLUTION INTRAVENOUS at 22:45

## 2020-10-22 RX ADMIN — FENTANYL CITRATE 50 MCG: 50 INJECTION, SOLUTION INTRAMUSCULAR; INTRAVENOUS at 23:24

## 2020-10-22 RX ADMIN — WATER 1 G: 1 INJECTION INTRAMUSCULAR; INTRAVENOUS; SUBCUTANEOUS at 08:37

## 2020-10-22 RX ADMIN — MIDAZOLAM HYDROCHLORIDE 0.5 MG: 1 INJECTION INTRAMUSCULAR; INTRAVENOUS at 23:30

## 2020-10-22 RX ADMIN — ENOXAPARIN SODIUM 40 MG: 40 INJECTION SUBCUTANEOUS at 21:18

## 2020-10-22 RX ADMIN — MIDAZOLAM HYDROCHLORIDE 0.5 MG: 1 INJECTION, SOLUTION INTRAMUSCULAR; INTRAVENOUS at 23:30

## 2020-10-22 ASSESSMENT — PAIN SCALES - GENERAL
PAINLEVEL_OUTOF10: 4
PAINLEVEL_OUTOF10: 0

## 2020-10-22 ASSESSMENT — PULMONARY FUNCTION TESTS: PIF_VALUE: 16

## 2020-10-22 ASSESSMENT — PAIN DESCRIPTION - PAIN TYPE: TYPE: CHRONIC PAIN

## 2020-10-22 NOTE — ED NOTES
Pt drowsy, easy to arouse.   remains at bedside     Janice Gayle [facility]  10/22/20 6871

## 2020-10-22 NOTE — ED PROVIDER NOTES
tablet Take 150 mcg by mouth Daily      aspirin 81 MG tablet Take 81 mg by mouth daily      DULoxetine (CYMBALTA) 60 MG extended release capsule Take 60 mg by mouth daily      traZODone (DESYREL) 100 MG tablet Take 200 mg by mouth nightly       pantoprazole sodium (PROTONIX) 40 MG PACK packet Take 40 mg by mouth 2 times daily (before meals)      potassium chloride (KLOR-CON) 20 MEQ packet Take 20 mEq by mouth 2 times daily      pregabalin (LYRICA) 100 MG capsule Take 300 mg by mouth 2 times daily. Ana Grates calcium citrate-vitamin D (CITRICAL + D) 315-250 MG-UNIT TABS per tablet Take 1 tablet by mouth 2 times daily (with meals)      HYDROcodone-acetaminophen (NORCO)  MG per tablet Take 1 tablet by mouth every 8 hours as needed for Pain. Ana Grates alendronate (FOSAMAX) 70 MG tablet Take 70 mg by mouth every 7 days        ALLERGIES   No Known Allergies   SOCIAL & FAMILY HISTORY   Social History     Socioeconomic History    Marital status:      Spouse name: None    Number of children: None    Years of education: None    Highest education level: None   Occupational History    None   Social Needs    Financial resource strain: None    Food insecurity     Worry: None     Inability: None    Transportation needs     Medical: None     Non-medical: None   Tobacco Use    Smoking status: Former Smoker     Last attempt to quit: 1976     Years since quittin.9    Smokeless tobacco: Never Used   Substance and Sexual Activity    Alcohol use: No    Drug use: No    Sexual activity: None   Lifestyle    Physical activity     Days per week: None     Minutes per session: None    Stress: None   Relationships    Social connections     Talks on phone: None     Gets together: None     Attends Christianity service: None     Active member of club or organization: None     Attends meetings of clubs or organizations: None     Relationship status: None    Intimate partner violence     Fear of current or ex partner: None     Emotionally abused: None     Physically abused: None     Forced sexual activity: None   Other Topics Concern    None   Social History Narrative    None     History reviewed. No pertinent family history. PHYSICAL EXAM   VITAL SIGNS: BP (!) 81/46   Pulse 110   Temp 99.5 °F (37.5 °C) (Tympanic)   Resp 19   Wt 164 lb (74.4 kg)   SpO2 95%   BMI 27.29 kg/m²    Constitutional: Well developed, well nourished, + acute distress   HENT: Atraumatic, dry mucus membranes  Neck: supple, no JVD   Respiratory: Lungs reveal diminished lung sounds bilaterally with wheezes  Cardiovascular: very tachy rate, no murmurs  GI: Soft, nontender, normal bowel sounds  Musculoskeletal: no acute deformities  Integument: Skin is warm and dry, no petechiae   Neurologic: Alert & oriented, normal speech  Psych: Pleasant affect, the patient does not appear anxious       RADIOLOGY/PROCEDURES   XR CHEST PORTABLE   Final Result   Congestion with bilateral pulmonary opacities consistent with pneumonia. ED COURSE & MEDICAL DECISION MAKING   Pertinent Labs & Imaging studies reviewed and interpreted. (See chart for details)   See electronic medical record for any other medications ordered. Vitals:    10/22/20 1140   BP: (!) 81/46   Pulse: 110   Resp: 19   Temp:    SpO2: 95%   Differential Diagnosis: COPD exacerbation, foreign body aspiration, Congestive Heart Failure, Myocardial Infarction, Pulmonary Embolus, Pneumonia, Pneumothorax, other     MDM: Patient well-appearing after BiPAP intervention. She will be transferred to [facility] for increased resources in case she becomes more critical.  I note that she was initially in the 60s on room air and then in the 80s on CPAP and then in the 90s on BiPAP. FINAL IMPRESSION   1. Pneumonia due to organism    2.  Septicemia ([facility] [address]      PLAN  xfer to [facility]  Electronically signed by: Nicole Stanton MD, 10/22/2020 11:52 AM  (This note was completed wth a voice recognition program)           Jaya Navas MD  10/22/20 2563

## 2020-10-22 NOTE — ED NOTES
Apparently NP at [facility]. V's feel pt needs to go to ICU. Had to turn transport away. Awaiting call from ICU.      Hansel Hou A Reser  10/22/20 5808

## 2020-10-22 NOTE — ED NOTES
Called Dr Christiano Payne via office phone, is in a room with a pt, will call back. Waiting on call back.      Elvira Jean  10/22/20 2741

## 2020-10-22 NOTE — ED NOTES
Called [facility] to start transfer to [facility], waiting for the hospitalist to call back.       Radha Pimentel  10/22/20 1038

## 2020-10-22 NOTE — ED NOTES
[facility] called back with hospitalist from [facility]'s, connected call to Dr Germania Bee.       Zeyad Saenz  10/22/20 6365

## 2020-10-22 NOTE — H&P
Critical Care - History and Physical Examination    Patient's name:  Seda Diallo  Medical Record Number: [id number]  Patient's account/billing number: [de-identified]  Patient's YOB: 1945  Age: 77 y.o. Date of Admission: 10/22/2020  6:58 PM  Date of History and Physical Examination: 10/22/2020      Primary Care Physician: Fran Figueroa MD  Attending Physician: Dr. Misael Salcido    Code Status: Full Code    Chief complaint: Pneumonia, sepsis    HISTORY OF PRESENT ILLNESS:   History was obtained from chart review and unobtainable from patient due to mental status. Seda Diallo is a 77 y.o. female with a history of hypothyroidism, COPD, iron deficiency anemia, presented to [facility] emergency department earlier today for difficulties breathing. Found to have pneumonia, and started on the sepsis protocol. Here she is unable to provide much history as she is somnolent, but does follow commands. per EMR review she was pending coughing last night became confused this morning prompting her emergency department visit. There they performed a chest x-ray as well as CT scan which was negative for any pulmonary embolism but did show bilateral infiltrates. Found to have a lactic acidosis as well, and started on Rocephin and azithromycin. Initially hypercapnic although apparently her mentation improved on BiPAP. Currently she is somnolent but will slowly respond to questions. Does say it is 2020 and the president is Tirso. Will follow commands but otherwise unable to provide much history. Of note she had an echocardiogram done on 6/24/2019 which showed a preserved ejection fraction of 55%.     Past Medical History:        Diagnosis Date    COPD (chronic obstructive pulmonary disease) ([facility] [address]     Depression     GERD (gastroesophageal reflux disease)     Osteoporosis        Past Surgical History:        Procedure Laterality Date    BACK SURGERY      BREAST SURGERY      CARPAL TUNNEL RELEASE      FRACTURE SURGERY Left 12/20/2018    ORIF wrist    HYSTERECTOMY      JOINT REPLACEMENT      right knee    JOINT REPLACEMENT      WRIST FRACTURE SURGERY Left 12/20/2018    WRIST OPEN REDUCTION INTERNAL FIXATION-DISTAL RADIUS performed by Rojas Duke MD at [address]       Allergies:    No Known Allergies      Home Meds:   Prior to Admission medications    Medication Sig Start Date End Date Taking? Authorizing Provider   furosemide (LASIX) 40 MG tablet Take 1 tablet by mouth as needed (edema) 8/21/20   Houck Begin, APRN - CNP   Hydroxychloroquine Sulfate (PLAQUENIL PO) Take by mouth    Historical Provider, MD   oxyCODONE-acetaminophen (PERCOCET) 5-325 MG per tablet Take 1 tablet by mouth every 4 hours as needed for Pain. Historical Provider, MD   NONFORMULARY     Historical Provider, MD   albuterol sulfate  (90 Base) MCG/ACT inhaler Inhale 2 puffs into the lungs 4 times daily 10/8/18   Isaiah Santos PA-C   levothyroxine (SYNTHROID) 150 MCG tablet Take 150 mcg by mouth Daily    Historical Provider, MD   aspirin 81 MG tablet Take 81 mg by mouth daily    Historical Provider, MD   DULoxetine (CYMBALTA) 60 MG extended release capsule Take 60 mg by mouth daily    Historical Provider, MD   traZODone (DESYREL) 100 MG tablet Take 200 mg by mouth nightly     Historical Provider, MD   pantoprazole sodium (PROTONIX) 40 MG PACK packet Take 40 mg by mouth 2 times daily (before meals)    Historical Provider, MD   potassium chloride (KLOR-CON) 20 MEQ packet Take 20 mEq by mouth 2 times daily    Historical Provider, MD   pregabalin (LYRICA) 100 MG capsule Take 300 mg by mouth 2 times daily. Marcio Mccann Historical Provider, MD   calcium citrate-vitamin D (CITRICAL + D) 315-250 MG-UNIT TABS per tablet Take 1 tablet by mouth 2 times daily (with meals)    Historical Provider, MD   HYDROcodone-acetaminophen (NORCO)  MG per tablet Take 1 tablet by mouth every 8 hours as needed for Pain. Marcio Mccann     Historical Provider, MD alendronate (FOSAMAX) 70 MG tablet Take 70 mg by mouth every 7 days    Historical Provider, MD     Social History:   TOBACCO:   reports that she quit smoking about 43 years ago. She has never used smokeless tobacco.  ETOH:   reports no history of alcohol use. DRUGS:  reports no history of drug use. Family History:   No family history on file. REVIEW OF SYSTEMS (ROS):  Review of Systems   General ROS: negative  Psychological ROS: negative  Ophthalmic ROS: negative  ENT ROS: negative  Allergy and Immunology ROS: negative  Hematological and Lymphatic ROS: negative  Endocrine ROS: negative  Breast ROS: negative  Respiratory ROS: no cough, shortness of breath, or wheezing  Cardiovascular ROS: no chest pain or dyspnea on exertion  Gastrointestinal ROS:negative  Genito-Urinary ROS: negative  Musculoskeletal ROS: negative  Neurological ROS: negative  Dermatological ROS: negative      Physical Exam:    Vitals: BP (!) 112/58   Pulse 98   Temp 99.3 °F (37.4 °C) (Axillary)   Resp 17   Wt 157 lb 6.5 oz (71.4 kg)   SpO2 97%   BMI 26.19 kg/m²     Last Body weight:   Wt Readings from Last 3 Encounters:   10/22/20 157 lb 6.5 oz (71.4 kg)   10/22/20 164 lb (74.4 kg)   08/21/20 164 lb 9.6 oz (74.7 kg)     Body Mass Index : Body mass index is 26.19 kg/m². PHYSICAL EXAMINATION :  General appearance -mild respiratory distress. Mental status -somnolent but arousable. Does follow commands. Does correctly state that Tirso is the president is currently 2020. Chest - rales noted bilateral lower lobes, R>L  Heart - tachycardic, but regular rhyhtm  Abdomen - soft, nontender, nondistended, no masses or organomegaly  Neurological -follows commands, moving all extremities spontaneously. No obvious focal neurologic deficits noted.   Extremities - no pedal edema noted  Skin -no rashes over exposed skin    Laboratory findings:-    CBC:   Recent Labs     10/22/20  0825   WBC 3.3*   HGB 15.1        BMP:    Recent Labs

## 2020-10-22 NOTE — ED NOTES
[facility] is accepted to [facility], waiting on a bed, Shalini [address] stated that [facility] is discharge dependent.         Macarena Corrales  10/22/20 112

## 2020-10-22 NOTE — ED NOTES
Contacted [facility] requesting update on bed assignment. [facility] discharge dependent at this time.      Aurelia PARKS Reser  10/22/20 7072

## 2020-10-22 NOTE — ED NOTES
Panel Management Review    Composite cancer screening  Chart review shows that this patient is due/due soon for the following Colonoscopy  Summary:    Patient is due/failing the following:   COLONOSCOPY    Action needed:   Patient needs referral/order: colonoscopy    Type of outreach:    Sent letter.    Questions for provider review:    None                                                                                                                                    Gosia Capone CMA          [facility] called with ICU doc from [facility]. V's on line to speak with Dr. Francesco Jimenez.      Yonis PARKS Reser  10/22/20 3726

## 2020-10-23 ENCOUNTER — APPOINTMENT (OUTPATIENT)
Dept: GENERAL RADIOLOGY | Age: 75
DRG: 870 | End: 2020-10-23
Attending: INTERNAL MEDICINE
Payer: MEDICARE

## 2020-10-23 LAB
ABSOLUTE EOS #: 0 K/UL (ref 0–0.44)
ABSOLUTE EOS #: 0 K/UL (ref 0–0.44)
ABSOLUTE IMMATURE GRANULOCYTE: 0.09 K/UL (ref 0–0.3)
ABSOLUTE IMMATURE GRANULOCYTE: 0.12 K/UL (ref 0–0.3)
ABSOLUTE LYMPH #: 1.13 K/UL (ref 1.1–3.7)
ABSOLUTE LYMPH #: 1.67 K/UL (ref 1.1–3.7)
ABSOLUTE MONO #: 0.47 K/UL (ref 0.1–1.2)
ABSOLUTE MONO #: 0.6 K/UL (ref 0.1–1.2)
ALBUMIN SERPL-MCNC: 3.2 G/DL (ref 3.5–5.2)
ALBUMIN SERPL-MCNC: 3.3 G/DL (ref 3.5–5.2)
ALBUMIN/GLOBULIN RATIO: 1.6 (ref 1–2.5)
ALBUMIN/GLOBULIN RATIO: 1.6 (ref 1–2.5)
ALLEN TEST: ABNORMAL
ALLEN TEST: ABNORMAL
ALP BLD-CCNC: 38 U/L (ref 35–104)
ALP BLD-CCNC: 40 U/L (ref 35–104)
ALT SERPL-CCNC: 13 U/L (ref 5–33)
ALT SERPL-CCNC: 13 U/L (ref 5–33)
ANION GAP SERPL CALCULATED.3IONS-SCNC: 11 MMOL/L (ref 9–17)
ANION GAP SERPL CALCULATED.3IONS-SCNC: 12 MMOL/L (ref 9–17)
AST SERPL-CCNC: 38 U/L
AST SERPL-CCNC: 38 U/L
BASOPHILS # BLD: 0 % (ref 0–2)
BASOPHILS # BLD: 0 % (ref 0–2)
BASOPHILS ABSOLUTE: 0 K/UL (ref 0–0.2)
BASOPHILS ABSOLUTE: 0 K/UL (ref 0–0.2)
BILIRUB SERPL-MCNC: 0.31 MG/DL (ref 0.3–1.2)
BILIRUB SERPL-MCNC: 0.39 MG/DL (ref 0.3–1.2)
BUN BLDV-MCNC: 17 MG/DL (ref 8–23)
BUN BLDV-MCNC: 18 MG/DL (ref 8–23)
BUN/CREAT BLD: ABNORMAL (ref 9–20)
BUN/CREAT BLD: ABNORMAL (ref 9–20)
CALCIUM IONIZED: 1.12 MMOL/L (ref 1.13–1.33)
CALCIUM SERPL-MCNC: 8.2 MG/DL (ref 8.6–10.4)
CALCIUM SERPL-MCNC: 8.4 MG/DL (ref 8.6–10.4)
CHLORIDE BLD-SCNC: 107 MMOL/L (ref 98–107)
CHLORIDE BLD-SCNC: 108 MMOL/L (ref 98–107)
CO2: 23 MMOL/L (ref 20–31)
CO2: 24 MMOL/L (ref 20–31)
CREAT SERPL-MCNC: 0.56 MG/DL (ref 0.5–0.9)
CREAT SERPL-MCNC: 0.59 MG/DL (ref 0.5–0.9)
DIFFERENTIAL TYPE: ABNORMAL
DIFFERENTIAL TYPE: ABNORMAL
EOSINOPHILS RELATIVE PERCENT: 0 % (ref 1–4)
EOSINOPHILS RELATIVE PERCENT: 0 % (ref 1–4)
FIO2: 50
FIO2: 60
GFR AFRICAN AMERICAN: >60 ML/MIN
GFR AFRICAN AMERICAN: >60 ML/MIN
GFR NON-AFRICAN AMERICAN: >60 ML/MIN
GFR NON-AFRICAN AMERICAN: >60 ML/MIN
GFR SERPL CREATININE-BSD FRML MDRD: ABNORMAL ML/MIN/{1.73_M2}
GLUCOSE BLD-MCNC: 104 MG/DL (ref 70–99)
GLUCOSE BLD-MCNC: 110 MG/DL (ref 70–99)
GLUCOSE BLD-MCNC: 133 MG/DL (ref 74–100)
HCT VFR BLD CALC: 38.9 % (ref 36.3–47.1)
HCT VFR BLD CALC: 39.7 % (ref 36.3–47.1)
HEMOGLOBIN: 12 G/DL (ref 11.9–15.1)
HEMOGLOBIN: 12.7 G/DL (ref 11.9–15.1)
IMMATURE GRANULOCYTES: 1 %
IMMATURE GRANULOCYTES: 1 %
LACTIC ACID, WHOLE BLOOD: 3 MMOL/L (ref 0.7–2.1)
LEGIONELLA PNEUMOPHILIA AG, URINE: NEGATIVE
LV EF: 28 %
LVEF MODALITY: NORMAL
LYMPHOCYTES # BLD: 12 % (ref 24–43)
LYMPHOCYTES # BLD: 14 % (ref 24–43)
MAGNESIUM: 2 MG/DL (ref 1.6–2.6)
MCH RBC QN AUTO: 30.2 PG (ref 25.2–33.5)
MCH RBC QN AUTO: 31 PG (ref 25.2–33.5)
MCHC RBC AUTO-ENTMCNC: 30.8 G/DL (ref 28.4–34.8)
MCHC RBC AUTO-ENTMCNC: 32 G/DL (ref 28.4–34.8)
MCV RBC AUTO: 96.8 FL (ref 82.6–102.9)
MCV RBC AUTO: 97.7 FL (ref 82.6–102.9)
MODE: ABNORMAL
MODE: ABNORMAL
MONOCYTES # BLD: 5 % (ref 3–12)
MONOCYTES # BLD: 5 % (ref 3–12)
MORPHOLOGY: NORMAL
MORPHOLOGY: NORMAL
NEGATIVE BASE EXCESS, ART: ABNORMAL (ref 0–2)
NEGATIVE BASE EXCESS, ART: ABNORMAL (ref 0–2)
NRBC AUTOMATED: 0 PER 100 WBC
NRBC AUTOMATED: 0 PER 100 WBC
O2 DEVICE/FLOW/%: ABNORMAL
O2 DEVICE/FLOW/%: ABNORMAL
PARTIAL THROMBOPLASTIN TIME: 29.9 SEC (ref 20.5–30.5)
PARTIAL THROMBOPLASTIN TIME: 41.3 SEC (ref 20.5–30.5)
PARTIAL THROMBOPLASTIN TIME: 46 SEC (ref 20.5–30.5)
PARTIAL THROMBOPLASTIN TIME: 50.2 SEC (ref 20.5–30.5)
PATIENT TEMP: 37.4
PATIENT TEMP: ABNORMAL
PDW BLD-RTO: 13.8 % (ref 11.8–14.4)
PDW BLD-RTO: 14.1 % (ref 11.8–14.4)
PLATELET # BLD: 227 K/UL (ref 138–453)
PLATELET # BLD: 248 K/UL (ref 138–453)
PLATELET ESTIMATE: ABNORMAL
PLATELET ESTIMATE: ABNORMAL
PMV BLD AUTO: 10.3 FL (ref 8.1–13.5)
PMV BLD AUTO: 9.8 FL (ref 8.1–13.5)
POC HCO3: 28.1 MMOL/L (ref 21–28)
POC HCO3: 28.5 MMOL/L (ref 21–28)
POC LACTIC ACID: 2.97 MMOL/L (ref 0.56–1.39)
POC LACTIC ACID: 3.48 MMOL/L (ref 0.56–1.39)
POC O2 SATURATION: 86 % (ref 94–98)
POC O2 SATURATION: 96 % (ref 94–98)
POC PCO2 TEMP: 56 MM HG
POC PCO2 TEMP: ABNORMAL MM HG
POC PCO2: 51.9 MM HG (ref 35–48)
POC PCO2: 55.2 MM HG (ref 35–48)
POC PH TEMP: 7.31
POC PH TEMP: ABNORMAL
POC PH: 7.32 (ref 7.35–7.45)
POC PH: 7.35 (ref 7.35–7.45)
POC PO2 TEMP: 59 MM HG
POC PO2 TEMP: ABNORMAL MM HG
POC PO2: 57.7 MM HG (ref 83–108)
POC PO2: 87.3 MM HG (ref 83–108)
POSITIVE BASE EXCESS, ART: 1 (ref 0–3)
POSITIVE BASE EXCESS, ART: 2 (ref 0–3)
POTASSIUM SERPL-SCNC: 3.9 MMOL/L (ref 3.7–5.3)
POTASSIUM SERPL-SCNC: 3.9 MMOL/L (ref 3.7–5.3)
POTASSIUM SERPL-SCNC: 4.2 MMOL/L (ref 3.7–5.3)
PROCALCITONIN: 8.28 NG/ML
RBC # BLD: 3.98 M/UL (ref 3.95–5.11)
RBC # BLD: 4.1 M/UL (ref 3.95–5.11)
RBC # BLD: ABNORMAL 10*6/UL
RBC # BLD: ABNORMAL 10*6/UL
SAMPLE SITE: ABNORMAL
SAMPLE SITE: ABNORMAL
SEG NEUTROPHILS: 80 % (ref 36–65)
SEG NEUTROPHILS: 82 % (ref 36–65)
SEGMENTED NEUTROPHILS ABSOLUTE COUNT: 7.71 K/UL (ref 1.5–8.1)
SEGMENTED NEUTROPHILS ABSOLUTE COUNT: 9.51 K/UL (ref 1.5–8.1)
SODIUM BLD-SCNC: 142 MMOL/L (ref 135–144)
SODIUM BLD-SCNC: 143 MMOL/L (ref 135–144)
SOURCE: NORMAL
STREP PNEUMONIAE ANTIGEN: NEGATIVE
TCO2 (CALC), ART: 30 MMOL/L (ref 22–29)
TCO2 (CALC), ART: 30 MMOL/L (ref 22–29)
TOTAL PROTEIN: 5.2 G/DL (ref 6.4–8.3)
TOTAL PROTEIN: 5.4 G/DL (ref 6.4–8.3)
TROPONIN INTERP: ABNORMAL
TROPONIN T: ABNORMAL NG/ML
TROPONIN, HIGH SENSITIVITY: 128 NG/L (ref 0–14)
TROPONIN, HIGH SENSITIVITY: 168 NG/L (ref 0–14)
TROPONIN, HIGH SENSITIVITY: 276 NG/L (ref 0–14)
TROPONIN, HIGH SENSITIVITY: 365 NG/L (ref 0–14)
WBC # BLD: 11.9 K/UL (ref 3.5–11.3)
WBC # BLD: 9.4 K/UL (ref 3.5–11.3)
WBC # BLD: ABNORMAL 10*3/UL
WBC # BLD: ABNORMAL 10*3/UL

## 2020-10-23 PROCEDURE — 2500000003 HC RX 250 WO HCPCS: Performed by: STUDENT IN AN ORGANIZED HEALTH CARE EDUCATION/TRAINING PROGRAM

## 2020-10-23 PROCEDURE — 37799 UNLISTED PX VASCULAR SURGERY: CPT

## 2020-10-23 PROCEDURE — 6360000002 HC RX W HCPCS: Performed by: STUDENT IN AN ORGANIZED HEALTH CARE EDUCATION/TRAINING PROGRAM

## 2020-10-23 PROCEDURE — 0BH17EZ INSERTION OF ENDOTRACHEAL AIRWAY INTO TRACHEA, VIA NATURAL OR ARTIFICIAL OPENING: ICD-10-PCS | Performed by: INTERNAL MEDICINE

## 2020-10-23 PROCEDURE — 84484 ASSAY OF TROPONIN QUANT: CPT

## 2020-10-23 PROCEDURE — 6370000000 HC RX 637 (ALT 250 FOR IP): Performed by: STUDENT IN AN ORGANIZED HEALTH CARE EDUCATION/TRAINING PROGRAM

## 2020-10-23 PROCEDURE — 2700000000 HC OXYGEN THERAPY PER DAY

## 2020-10-23 PROCEDURE — 82803 BLOOD GASES ANY COMBINATION: CPT

## 2020-10-23 PROCEDURE — 2580000003 HC RX 258: Performed by: INTERNAL MEDICINE

## 2020-10-23 PROCEDURE — 99291 CRITICAL CARE FIRST HOUR: CPT | Performed by: INTERNAL MEDICINE

## 2020-10-23 PROCEDURE — 87899 AGENT NOS ASSAY W/OPTIC: CPT

## 2020-10-23 PROCEDURE — 2500000003 HC RX 250 WO HCPCS: Performed by: INTERNAL MEDICINE

## 2020-10-23 PROCEDURE — 36415 COLL VENOUS BLD VENIPUNCTURE: CPT

## 2020-10-23 PROCEDURE — 6360000002 HC RX W HCPCS

## 2020-10-23 PROCEDURE — 85025 COMPLETE CBC W/AUTO DIFF WBC: CPT

## 2020-10-23 PROCEDURE — 83605 ASSAY OF LACTIC ACID: CPT

## 2020-10-23 PROCEDURE — 2580000003 HC RX 258: Performed by: STUDENT IN AN ORGANIZED HEALTH CARE EDUCATION/TRAINING PROGRAM

## 2020-10-23 PROCEDURE — 82947 ASSAY GLUCOSE BLOOD QUANT: CPT

## 2020-10-23 PROCEDURE — 93306 TTE W/DOPPLER COMPLETE: CPT

## 2020-10-23 PROCEDURE — 94761 N-INVAS EAR/PLS OXIMETRY MLT: CPT

## 2020-10-23 PROCEDURE — 36556 INSERT NON-TUNNEL CV CATH: CPT

## 2020-10-23 PROCEDURE — 82330 ASSAY OF CALCIUM: CPT

## 2020-10-23 PROCEDURE — 71045 X-RAY EXAM CHEST 1 VIEW: CPT

## 2020-10-23 PROCEDURE — 93005 ELECTROCARDIOGRAM TRACING: CPT | Performed by: STUDENT IN AN ORGANIZED HEALTH CARE EDUCATION/TRAINING PROGRAM

## 2020-10-23 PROCEDURE — 94770 HC ETCO2 MONITOR DAILY: CPT

## 2020-10-23 PROCEDURE — 99222 1ST HOSP IP/OBS MODERATE 55: CPT | Performed by: INTERNAL MEDICINE

## 2020-10-23 PROCEDURE — 36620 INSERTION CATHETER ARTERY: CPT

## 2020-10-23 PROCEDURE — 2500000003 HC RX 250 WO HCPCS

## 2020-10-23 PROCEDURE — 94003 VENT MGMT INPAT SUBQ DAY: CPT

## 2020-10-23 PROCEDURE — 06HM33Z INSERTION OF INFUSION DEVICE INTO RIGHT FEMORAL VEIN, PERCUTANEOUS APPROACH: ICD-10-PCS | Performed by: STUDENT IN AN ORGANIZED HEALTH CARE EDUCATION/TRAINING PROGRAM

## 2020-10-23 PROCEDURE — 2000000000 HC ICU R&B

## 2020-10-23 PROCEDURE — 5A1955Z RESPIRATORY VENTILATION, GREATER THAN 96 CONSECUTIVE HOURS: ICD-10-PCS | Performed by: INTERNAL MEDICINE

## 2020-10-23 PROCEDURE — 86738 MYCOPLASMA ANTIBODY: CPT

## 2020-10-23 PROCEDURE — 84132 ASSAY OF SERUM POTASSIUM: CPT

## 2020-10-23 PROCEDURE — 83735 ASSAY OF MAGNESIUM: CPT

## 2020-10-23 PROCEDURE — 87449 NOS EACH ORGANISM AG IA: CPT

## 2020-10-23 PROCEDURE — 85730 THROMBOPLASTIN TIME PARTIAL: CPT

## 2020-10-23 PROCEDURE — 0CJS8ZZ INSPECTION OF LARYNX, VIA NATURAL OR ARTIFICIAL OPENING ENDOSCOPIC: ICD-10-PCS | Performed by: INTERNAL MEDICINE

## 2020-10-23 PROCEDURE — 80053 COMPREHEN METABOLIC PANEL: CPT

## 2020-10-23 PROCEDURE — 94640 AIRWAY INHALATION TREATMENT: CPT

## 2020-10-23 RX ORDER — NOREPINEPHRINE BIT/0.9 % NACL 16MG/250ML
INFUSION BOTTLE (ML) INTRAVENOUS
Status: DISPENSED
Start: 2020-10-23 | End: 2020-10-23

## 2020-10-23 RX ORDER — NOREPINEPHRINE BIT/0.9 % NACL 16MG/250ML
2 INFUSION BOTTLE (ML) INTRAVENOUS CONTINUOUS
Status: DISCONTINUED | OUTPATIENT
Start: 2020-10-23 | End: 2020-11-01

## 2020-10-23 RX ORDER — HEPARIN SODIUM 1000 [USP'U]/ML
2000 INJECTION, SOLUTION INTRAVENOUS; SUBCUTANEOUS PRN
Status: DISCONTINUED | OUTPATIENT
Start: 2020-10-23 | End: 2020-10-23

## 2020-10-23 RX ORDER — POTASSIUM CHLORIDE 29.8 MG/ML
20 INJECTION INTRAVENOUS ONCE
Status: COMPLETED | OUTPATIENT
Start: 2020-10-23 | End: 2020-10-23

## 2020-10-23 RX ORDER — FUROSEMIDE 10 MG/ML
40 INJECTION INTRAMUSCULAR; INTRAVENOUS ONCE
Status: COMPLETED | OUTPATIENT
Start: 2020-10-23 | End: 2020-10-23

## 2020-10-23 RX ORDER — HEPARIN SODIUM 1000 [USP'U]/ML
4000 INJECTION, SOLUTION INTRAVENOUS; SUBCUTANEOUS ONCE
Status: COMPLETED | OUTPATIENT
Start: 2020-10-23 | End: 2020-10-23

## 2020-10-23 RX ORDER — LABETALOL HYDROCHLORIDE 5 MG/ML
INJECTION, SOLUTION INTRAVENOUS
Status: COMPLETED
Start: 2020-10-23 | End: 2020-10-23

## 2020-10-23 RX ORDER — 0.9 % SODIUM CHLORIDE 0.9 %
500 INTRAVENOUS SOLUTION INTRAVENOUS ONCE
Status: DISCONTINUED | OUTPATIENT
Start: 2020-10-23 | End: 2020-10-26

## 2020-10-23 RX ORDER — IPRATROPIUM BROMIDE AND ALBUTEROL SULFATE 2.5; .5 MG/3ML; MG/3ML
1 SOLUTION RESPIRATORY (INHALATION) EVERY 6 HOURS
Status: DISCONTINUED | OUTPATIENT
Start: 2020-10-23 | End: 2020-11-03

## 2020-10-23 RX ORDER — CISATRACURIUM BESYLATE 10 MG/ML
10 INJECTION, SOLUTION INTRAVENOUS ONCE
Status: COMPLETED | OUTPATIENT
Start: 2020-10-23 | End: 2020-10-23

## 2020-10-23 RX ORDER — METOPROLOL TARTRATE 5 MG/5ML
2.5 INJECTION INTRAVENOUS ONCE
Status: COMPLETED | OUTPATIENT
Start: 2020-10-23 | End: 2020-10-23

## 2020-10-23 RX ORDER — HEPARIN SODIUM 1000 [USP'U]/ML
4000 INJECTION, SOLUTION INTRAVENOUS; SUBCUTANEOUS PRN
Status: DISCONTINUED | OUTPATIENT
Start: 2020-10-23 | End: 2020-10-23

## 2020-10-23 RX ORDER — LABETALOL HYDROCHLORIDE 5 MG/ML
10 INJECTION, SOLUTION INTRAVENOUS EVERY 6 HOURS PRN
Status: DISCONTINUED | OUTPATIENT
Start: 2020-10-23 | End: 2020-10-23

## 2020-10-23 RX ORDER — HEPARIN SODIUM 10000 [USP'U]/100ML
12 INJECTION, SOLUTION INTRAVENOUS CONTINUOUS
Status: DISCONTINUED | OUTPATIENT
Start: 2020-10-23 | End: 2020-10-27

## 2020-10-23 RX ORDER — LORAZEPAM 2 MG/ML
2 INJECTION INTRAMUSCULAR ONCE
Status: COMPLETED | OUTPATIENT
Start: 2020-10-23 | End: 2020-10-23

## 2020-10-23 RX ORDER — ALBUTEROL SULFATE 2.5 MG/3ML
2.5 SOLUTION RESPIRATORY (INHALATION)
Status: DISCONTINUED | OUTPATIENT
Start: 2020-10-22 | End: 2020-11-06 | Stop reason: HOSPADM

## 2020-10-23 RX ORDER — CALCIUM GLUCONATE 20 MG/ML
1 INJECTION, SOLUTION INTRAVENOUS ONCE
Status: COMPLETED | OUTPATIENT
Start: 2020-10-23 | End: 2020-10-23

## 2020-10-23 RX ORDER — LABETALOL HYDROCHLORIDE 5 MG/ML
10 INJECTION, SOLUTION INTRAVENOUS ONCE
Status: COMPLETED | OUTPATIENT
Start: 2020-10-23 | End: 2020-10-23

## 2020-10-23 RX ORDER — DEXMEDETOMIDINE HYDROCHLORIDE 4 UG/ML
0.2 INJECTION, SOLUTION INTRAVENOUS CONTINUOUS
Status: DISCONTINUED | OUTPATIENT
Start: 2020-10-23 | End: 2020-10-23

## 2020-10-23 RX ORDER — HEPARIN SODIUM 1000 [USP'U]/ML
80 INJECTION, SOLUTION INTRAVENOUS; SUBCUTANEOUS PRN
Status: DISCONTINUED | OUTPATIENT
Start: 2020-10-23 | End: 2020-10-28

## 2020-10-23 RX ORDER — HEPARIN SODIUM 1000 [USP'U]/ML
40 INJECTION, SOLUTION INTRAVENOUS; SUBCUTANEOUS PRN
Status: DISCONTINUED | OUTPATIENT
Start: 2020-10-23 | End: 2020-10-26

## 2020-10-23 RX ORDER — DIGOXIN 0.25 MG/ML
250 INJECTION INTRAMUSCULAR; INTRAVENOUS ONCE
Status: COMPLETED | OUTPATIENT
Start: 2020-10-23 | End: 2020-10-23

## 2020-10-23 RX ADMIN — VASOPRESSIN 0.03 UNITS/MIN: 20 INJECTION INTRAVENOUS at 16:54

## 2020-10-23 RX ADMIN — METOPROLOL TARTRATE 2.5 MG: 1 INJECTION, SOLUTION INTRAVENOUS at 16:22

## 2020-10-23 RX ADMIN — AZITHROMYCIN MONOHYDRATE 500 MG: 500 INJECTION, POWDER, LYOPHILIZED, FOR SOLUTION INTRAVENOUS at 07:56

## 2020-10-23 RX ADMIN — Medication 81 MG: at 07:55

## 2020-10-23 RX ADMIN — LORAZEPAM 2 MG: 2 INJECTION INTRAMUSCULAR; INTRAVENOUS at 04:06

## 2020-10-23 RX ADMIN — Medication 200 MCG/HR: at 10:37

## 2020-10-23 RX ADMIN — LABETALOL HYDROCHLORIDE 10 MG: 5 INJECTION, SOLUTION INTRAVENOUS at 08:27

## 2020-10-23 RX ADMIN — DEXMEDETOMIDINE HYDROCHLORIDE 0.2 MCG/KG/HR: 400 INJECTION INTRAVENOUS at 00:24

## 2020-10-23 RX ADMIN — FUROSEMIDE 40 MG: 10 INJECTION, SOLUTION INTRAMUSCULAR; INTRAVENOUS at 10:00

## 2020-10-23 RX ADMIN — Medication 200 MCG/HR: at 20:18

## 2020-10-23 RX ADMIN — LEVOTHYROXINE SODIUM 150 MCG: 75 TABLET ORAL at 07:56

## 2020-10-23 RX ADMIN — HEPARIN SODIUM 2860 UNITS: 1000 INJECTION INTRAVENOUS; SUBCUTANEOUS at 23:28

## 2020-10-23 RX ADMIN — HEPARIN SODIUM 16.01 UNITS/KG/HR: 10000 INJECTION, SOLUTION INTRAVENOUS at 23:30

## 2020-10-23 RX ADMIN — CEFTRIAXONE SODIUM 1 G: 1 INJECTION, POWDER, FOR SOLUTION INTRAMUSCULAR; INTRAVENOUS at 07:56

## 2020-10-23 RX ADMIN — ACETAMINOPHEN 650 MG: 325 TABLET ORAL at 20:56

## 2020-10-23 RX ADMIN — HEPARIN SODIUM 12 UNITS/KG/HR: 10000 INJECTION, SOLUTION INTRAVENOUS at 04:30

## 2020-10-23 RX ADMIN — POTASSIUM CHLORIDE 20 MEQ: 29.8 INJECTION, SOLUTION INTRAVENOUS at 18:03

## 2020-10-23 RX ADMIN — CISATRACURIUM BESYLATE 2.5 MCG/KG/MIN: 200 INJECTION INTRAVENOUS at 20:48

## 2020-10-23 RX ADMIN — Medication 100 MCG/HR: at 04:16

## 2020-10-23 RX ADMIN — CALCIUM GLUCONATE 1 G: 20 INJECTION, SOLUTION INTRAVENOUS at 18:03

## 2020-10-23 RX ADMIN — IPRATROPIUM BROMIDE AND ALBUTEROL SULFATE 1 AMPULE: .5; 3 SOLUTION RESPIRATORY (INHALATION) at 14:44

## 2020-10-23 RX ADMIN — PROPOFOL 30 MCG/KG/MIN: 10 INJECTION, EMULSION INTRAVENOUS at 00:22

## 2020-10-23 RX ADMIN — SODIUM CHLORIDE, PRESERVATIVE FREE 10 ML: 5 INJECTION INTRAVENOUS at 21:07

## 2020-10-23 RX ADMIN — AMIODARONE HYDROCHLORIDE 1 MG/MIN: 50 INJECTION, SOLUTION INTRAVENOUS at 20:10

## 2020-10-23 RX ADMIN — FUROSEMIDE 40 MG: 40 TABLET ORAL at 07:56

## 2020-10-23 RX ADMIN — CISATRACURIUM BESYLATE 10 MG: 200 INJECTION, SOLUTION INTRAVENOUS at 08:01

## 2020-10-23 RX ADMIN — PREGABALIN 300 MG: 100 CAPSULE ORAL at 21:01

## 2020-10-23 RX ADMIN — DIGOXIN 250 MCG: 0.25 INJECTION INTRAMUSCULAR; INTRAVENOUS at 21:53

## 2020-10-23 RX ADMIN — IPRATROPIUM BROMIDE AND ALBUTEROL SULFATE 1 AMPULE: .5; 3 SOLUTION RESPIRATORY (INHALATION) at 08:42

## 2020-10-23 RX ADMIN — AMIODARONE HYDROCHLORIDE 150 MG: 50 INJECTION, SOLUTION INTRAVENOUS at 20:10

## 2020-10-23 RX ADMIN — Medication 2 MG/HR: at 10:25

## 2020-10-23 RX ADMIN — Medication 2 MCG/MIN: at 06:25

## 2020-10-23 RX ADMIN — CISATRACURIUM BESYLATE 2 MCG/KG/MIN: 200 INJECTION INTRAVENOUS at 09:29

## 2020-10-23 RX ADMIN — PREGABALIN 300 MG: 100 CAPSULE ORAL at 07:56

## 2020-10-23 RX ADMIN — Medication 10 MG: at 08:27

## 2020-10-23 RX ADMIN — POTASSIUM CHLORIDE 20 MEQ: 1500 TABLET, EXTENDED RELEASE ORAL at 07:57

## 2020-10-23 RX ADMIN — FAMOTIDINE 20 MG: 10 INJECTION INTRAVENOUS at 08:44

## 2020-10-23 RX ADMIN — Medication 200 MCG/HR: at 15:29

## 2020-10-23 RX ADMIN — HEPARIN SODIUM 4000 UNITS: 1000 INJECTION INTRAVENOUS; SUBCUTANEOUS at 04:29

## 2020-10-23 RX ADMIN — ALBUTEROL SULFATE 2.5 MG: 2.5 SOLUTION RESPIRATORY (INHALATION) at 03:31

## 2020-10-23 RX ADMIN — FAMOTIDINE 20 MG: 10 INJECTION INTRAVENOUS at 21:02

## 2020-10-23 RX ADMIN — IPRATROPIUM BROMIDE AND ALBUTEROL SULFATE 1 AMPULE: .5; 3 SOLUTION RESPIRATORY (INHALATION) at 20:30

## 2020-10-23 ASSESSMENT — PULMONARY FUNCTION TESTS
PIF_VALUE: 11
PIF_VALUE: 36
PIF_VALUE: 28
PIF_VALUE: 25
PIF_VALUE: 26

## 2020-10-23 NOTE — CARE COORDINATION
Case Management Initial Discharge [address] [facility],             Met with:spouse/SO to discuss discharge plans. Information verified: address, contacts, phone number, , insurance Yes    Emergency Contact/Next of Kin name & number: Spouse Fermin Ellis [phone], Sveta Cyr [phone]    PCP: Heriberto Neville MD  Date of last visit: one month    Insurance Provider: medicare, generic commercial    Discharge Planning    Living Arrangements:   lives with   Support Systems:       Home has 2 stories  2-3 stairs to climb to get into front door, flight of stairs to climb to reach second floor  Location of bedroom/bathroom in home main    Patient able to perform ADL's:Independent    Current Services (outpatient & in home) DME  DME equipment: cane, walker, shower bars, shower chair  DME provider: na    Receiving oral anticoagulation therapy? No    If indicated:   Physician managing anticoagulation treatment: none  Where does patient obtain lab work for ATC treatment? na      Potential Assistance Needed:       Patient agreeable to home care: No  [place] of choice provided:  n/a    Prior SNF/Rehab Placement and Facility: none  Agreeable to SNF/Rehab: No  [place] of choice provided: n/a     Evaluation: n/a    Expected Discharge date:       Patient expects to be discharged to: Follow Up Appointment: Best Day/ Time:      Transportation provider: namrata  Transportation arrangements needed for discharge: Yes    Readmission Risk              Risk of Unplanned Readmission:        21             Does patient have a readmission risk score greater than 14?: Yes  If yes, follow-up appointment must be made within 7 days of discharge.      Goals of Care: comfort      Discharge Plan: Intub/vent, referral to [facility] notified    Electronically signed by Ezra Narayanan RN on 10/23/20 at 4:29 PM EDT    Patient/family seen: Yes       Informed patient/family of [facility] Medical Bundle Program with potential outreach by either Care Transitions Team or [facility]Health Team based on hospital admission and location.        [facility] Notification Letter given: Yes         Current discharge plan: Pablo marsh

## 2020-10-23 NOTE — PROGRESS NOTES
Echo images reviewed and patient assessed. Case discussed with interventionalist as well. Plan to stabilize the patient, treat underlying septic/cardiogenic shock and tentative plan for cath on Monday.      Ta Montague MD  Cardiology Fellow

## 2020-10-23 NOTE — PROGRESS NOTES
Pt work of breathing increased, called crit care resident to bedside. Decision was made to intubate due to decompensating resp status.      2340 - 30 of etomidate IV  2340 - 100 of succ IV  2341 - 7cm ETT placed, positive color change, 23 at the lip; stat xray ordered to confirm placement

## 2020-10-23 NOTE — PROGRESS NOTES
Insert Arterial Line  Date/Time:  10/23/20, 2:05AM  Performed by: Christopher Kilpatrick    Patient identity confirmed: arm band and provided demographic data   Time out: Immediately prior to procedure a \"time out\" was called to verify the correct patient, procedure, equipment, support staff. Preparation: Patient was prepped and draped in the usual sterile fashion.     Location:right radial    Marty's test normal: yes  Needle gauge: 20     Number of attempts: 1  Post-procedure: transparent dressing applied and line secured    Patient tolerance: well

## 2020-10-23 NOTE — PROGRESS NOTES
Date: 10/22/2020  Time: 2310  Patient identity confirmed:  Yes  Indications: impending respiratory failure  Preoxygenation: yes    Laryngoscope size and type Linda 4  Airway introducer used: No  Evac: No  ETT size:a 7.0 cuffed  Number of attempts:1   Cords visualized:  [x] Clearly  [] Poorly  Breath sounds present bilaterally: Yes   ETCO2   [x] Positive   ETT secured at  23    ETT secured with Sebastian  Chest x-ray ordered: Yes     Difficult airway:    No       If yes, was red tape placed around ETT:   No    Was this a Code Situation:    No             BP: 126/61        Procedure performed by: Peri Tang  12:14 AM

## 2020-10-23 NOTE — PLAN OF CARE
Problem: OXYGENATION/RESPIRATORY FUNCTION  Goal: Patient will maintain patent airway  10/23/2020 0855 by Jenn Le RCP  Outcome: Ongoing     Problem: OXYGENATION/RESPIRATORY FUNCTION  Goal: Patient will achieve/maintain normal respiratory rate/effort  Description: Respiratory rate and effort will be within normal limits for the patient  10/23/2020 0855 by Jenn Le RCP  Outcome: Ongoing     Problem: MECHANICAL VENTILATION  Goal: Patient will maintain patent airway  10/23/2020 0855 by Jenn Le RCP  Outcome: Ongoing     Problem: MECHANICAL VENTILATION  Goal: Oral health is maintained or improved  10/23/2020 0855 by Jenn Le RCP  Outcome: Ongoing     Problem: MECHANICAL VENTILATION  Goal: ET tube will be managed safely  10/23/2020 0855 by Jenn Le RCP  Outcome: Ongoing     Problem: MECHANICAL VENTILATION  Goal: Ability to express needs and understand communication  10/23/2020 0855 by Jenn Le RCP  Outcome: Ongoing     Problem: MECHANICAL VENTILATION  Goal: Mobility/activity is maintained at optimum level for patient  10/23/2020 0855 by Jenn Le RCP  Outcome: Ongoing     Problem: SKIN INTEGRITY  Goal: Skin integrity is maintained or improved  10/23/2020 0855 by Jenn Le RCP  Outcome: Ongoing

## 2020-10-23 NOTE — PROGRESS NOTES
Comprehensive Nutrition Assessment    Type and Reason for Visit:  Initial(vent)    Nutrition Recommendations/Plan: Start nutrition as able; if TF, suggest Standard without Fiber goal 65 mL/hr to provide 1872 kcal and 87 g pro/day. Will follow/monitor plans. Nutrition Assessment:  Pt admitted with COPD exacerbation, pneumonia, sepsis. Pt is currently intubated. No nutrition support yet. Meds/labs reviewed. Malnutrition Assessment:  Malnutrition Status:  Insufficient data    Context:  Acute Illness     Findings of the 6 clinical characteristics of malnutrition:  Energy Intake:  Unable to assess  Weight Loss:  Unable to assess     Body Fat Loss:  Unable to assess     Muscle Mass Loss:  Unable to assess    Fluid Accumulation:  Unable to assess     Strength:  Not Performed    Estimated Daily Nutrient Needs:  Energy (kcal):  1491-5708 kcal/day; Weight Used for Energy Requirements:  Current     Protein (g):  80 g pro/day; Weight Used for Protein Requirements:  Ideal(1.5)          Current Nutrition Therapies:    Diet NPO Effective Now    Anthropometric Measures:  · Height: 5' 4\" (162.6 cm)  · Current Body Weight: 157 lb 6.5 oz (71.4 kg)   · Ideal Body Weight: 120 lbs; % Ideal Body Weight  131%   · BMI: 27  · BMI Categories: Overweight (BMI 25.0-29. 9)       Nutrition Diagnosis:   · Inadequate oral intake related to impaired respiratory function as evidenced by NPO or clear liquid status due to medical condition    Nutrition Interventions:   Food and/or Nutrient Delivery: Start nutrition as able; if TF, suggest Standard without Fiber goal 65 mL/hr to provide 1872 kcal and 87 g pro/day.   Nutrition Education/Counseling:  No recommendation at this time   Coordination of Nutrition Care:  Continued Inpatient Monitoring    Goals:  meet % of estimated nutrient needs       Nutrition Monitoring and Evaluation:   Food/Nutrient Intake Outcomes:  Diet Advancement/Tolerance  Physical Signs/Symptoms Outcomes: Biochemical Data, Nutrition Focused Physical Findings, Skin, Weight     Electronically signed by Grant Rivera RD, ANDREW on 10/23/20 at 3:09 PM EDT    Contact: [phone]

## 2020-10-23 NOTE — PROGRESS NOTES
Critical Care Team - Daily Progress Note      Date and time: 10/23/2020 8:00 AM  Patient's name:  Jer Minor Record Number: [id number]  Patient's account/billing number: [de-identified]  Patient's YOB: 1945  Age: 77 y.o.   Date of Admission: 10/22/2020  6:58 PM  Length of stay during current admission: 1      Primary Care Physician: Fran Figueroa MD  ICU Attending Physician: Dr. Misael Salcido    Code Status: Full Code    Reason for ICU admission: pneumonia, sepsis      SUBJECTIVE:     OVERNIGHT EVENTS:       Required intubation for significantly increased work of breathing, altered mental status    AWAKE & FOLLOWING COMMANDS:  [x] No   [] Yes    CURRENT VENTILATION STATUS:     [x] Ventilator  [] BIPAP  [] Nasal Cannula [] Room Air      IF INTUBATED, ET TUBE MARKING AT LOWER LIP:       cms    SECRETIONS Amount:  [] Small [] Moderate  [] Large  [] None  Color:     [] White [] Colored  [] Bloody    SEDATION:  RAAS Score:  [x] Propofol gtt  [] Versed gtt  [] Ativan gtt   [] No Sedation    PARALYZED:  [] No    [x] Yes    DIARRHEA:                [x] No                [] Yes  (C. Difficile status: [] positive                                                                                                                       [] negative                                                                                                                     [] pending)    VASOPRESSORS:  [] No    [x] Yes    If yes -   [x] Levophed       [] Dopamine     [] Vasopressin       [] Dobutamine  [] Phenylephrine         [] Epinephrine    CENTRAL LINES:     [x] No   [] Yes   (Date of Insertion:   )           If yes -     [] Right IJ     [] Left IJ [] Right Femoral [] Left Femoral                   [] Right Subclavian [] Left Subclavian       LUNA'S CATHETER:   [] No   [x] Yes  (Date of Insertion: 10/23/2020   )     URINE OUTPUT:            [] Good   [] Low              [] Anuric      OBJECTIVE:     VITAL SIGNS:  BP (!) 114/55   Pulse 127   Temp 99.4 °F (37.4 °C) (Oral)   Resp 23   Ht 5' 4\" (1.626 m)   Wt 157 lb 6.5 oz (71.4 kg)   SpO2 100%   BMI 27.02 kg/m²     Tmax over 24 hours:  Temp (24hrs), Av.6 °F (37.6 °C), Min:99.3 °F (37.4 °C), Max:100.3 °F (37.9 °C)      Patient Vitals for the past 6 hrs:   BP Temp Temp src Pulse Resp SpO2   10/23/20 0645 (!) 114/55 -- -- 127 23 100 %   10/23/20 0630 (!) 108/57 -- -- 133 20 100 %   10/23/20 0615 (!) 85/43 -- -- 134 21 100 %   10/23/20 0600 (!) 107/43 -- -- 127 25 100 %   10/23/20 0500 (!) 137/112 -- -- 129 22 98 %   10/23/20 0438 -- -- -- -- 26 94 %   10/23/20 0400 (!) 174/96 99.4 °F (37.4 °C) Oral 126 (!) 31 96 %   10/23/20 0332 -- -- -- -- 26 100 %   10/23/20 0300 129/69 -- -- 111 23 100 %   10/23/20 0230 (!) 110/56 -- -- 104 18 --   10/23/20 0215 (!) 98/51 -- -- 103 17 100 %         Intake/Output Summary (Last 24 hours) at 10/23/2020 0800  Last data filed at 10/23/2020 0600  Gross per 24 hour   Intake 2041.8 ml   Output 1225 ml   Net 816.8 ml     Wt Readings from Last 2 Encounters:   10/22/20 157 lb 6.5 oz (71.4 kg)   10/22/20 164 lb (74.4 kg)     Body mass index is 27.02 kg/m². PHYSICAL EXAMINATION:  General appearance - ill-appearing  Mental status - intubated, sedated  Chest - rhonchi noted  Heart - tachycardic, regular rhythm  Abdomen - soft, nontender, nondistended, no masses or organomegaly  Neurological - intubated, sedated. Extremities - no pedal edema noted  Skin - no rashes over exposed skin.       Any additional physical findings:     MEDICATIONS:    Scheduled Meds:   ipratropium-albuterol  1 ampule Inhalation Q6H    cisatracurium besylate  10 mg Intravenous Once    azithromycin  500 mg Intravenous Q24H    cefTRIAXone (ROCEPHIN) IV  1 g Intravenous Q24H    aspirin  81 mg Oral Daily    levothyroxine  150 mcg Oral Daily    potassium chloride  20 mEq Oral BID    pregabalin  300 mg Oral BID    sodium chloride flush  10 mL Intravenous 2 times per day multifocal airspace opacities worrisome for   multifocal pneumonia. XR CHEST PORTABLE    (Results Pending)        ASSESSMENT:     Patient Active Problem List    Diagnosis Date Noted    Acute metabolic encephalopathy      Priority: High     Class: Acute    Sepsis ([facility] 10/22/2020    COPD exacerbation ([facility] 10/22/2020    Sepsis due to pneumonia ([facility] 2020    Lactic acidosis 2020    Septic shock ([facility] 2020    Status post surgery 2018    Pneumonia of right upper lobe due to infectious organism 10/05/2018    Hypothyroidism 10/05/2018    Major depressive disorder 10/05/2018    Chronic midline low back pain without sciatica 10/05/2018    Iron deficiency anemia 10/05/2018       Additional assessment:    · Respiratory failure  · Sepsis  · Pneumonia        PLAN:     WEAN PER PROTOCOL:  [] No   [x] Yes  [] N/A    DISCONTINUE ANY LABS:   [x] No   [] Yes    ICU PROPHYLAXIS:  Stress ulcer:  [] PPI Agent  [x] M1Ffoik [] Sucralfate  [] Other:  VTE:   [] Enoxaparin  [] Unfract. Heparin Subcut  [x] EPC Cuffs    NUTRITION:  [x] NPO [] Tube Feeding (Specify: ) [] TPN  [] PO (Diet: Diet NPO Effective Now)    HOME MEDICATIONS RECONCILED: [x] No  [] Yes    INSULIN DRIP:   [x] No   [] Yes    CONSULTATION NEEDED:  [] No   [x] Yes    FAMILY UPDATED:    [x] No   [] Yes    TRANSFER OUT OF ICU:   [] No   [] Yes    ADDITIONAL PLAN:    1. Neuro  1. Fentanyl, propofol. 2. Nimbex dose for vent dyssynchrony  2. CV  1. Tachycardic, likely from sepsis  2. Levo  3. Troponinemia. No EKG changes. Cardiology consulted. Heparin GTT started. 3. Pulm  1. Intubated. PRVC 20 / 480 / 12 / 55%  2. AB.31 / 55 / 58   3. PaO2 / FiO2: 146  4. GI  1. NPO for now  2. Pepcid for GI ppx  5. /Renal  1. Cr at baseline. Monitor UOP  2. Electrolytes stable  6. ID/Heme  1. Multifocal pneumonia: rocephin, azithromycin. ID consulted. 7. PPX  1.  Heparin Gtt    [address], [place]  10/23/2020, 8:00 AM

## 2020-10-23 NOTE — PROCEDURES
PROCEDURE NOTE - EMERGENCY INTUBATION    PATIENT NAME: Michael Sanchez  MEDICAL RECORD NO. [id number]  DATE: 10/23/2020  ATTENDING PHYSICIAN: Dr. Cheo Garcia DIAGNOSIS:  Acute Respiratory Failure  POSTOPERATIVE DIAGNOSIS:  Same  PROCEDURE PERFORMED:  Emergency endotracheal intubation  PERFORMING PHYSICIAN: Viral Call DO    MEDICATIONS: etomidate intravenously and succinycholine intravenously    DISCUSSION:  Michael Sanchez is a 77y.o.-year-old female who requires intubation and ventilatory support due to impending respiratory failure. The history and physical examination were reviewed and confirmed. CONSENT: Unable to be obtained due to the emergent nature of this procedure. PROCEDURE:  A timeout was initiated by the bedside nurse and was confirmed by those present. The patient was placed in the appropriate position. Cricoid pressure was not required. Intubation was performed by direct laryngoscopy using a laryngoscope and a 7.0 cuffed endotracheal tube. The cuff was then inflated and the tube was secured appropriately at a distance of 23 cm to the dental ridge. Initial confirmation of placement included bilateral breath sounds, an end tidal CO2 detector, absence of sounds over the stomach, tube fogging and adequate pulse oximetry reading. A chest x-ray to verify correct placement of the tube showed appropriate tube position. The patient tolerated the procedure well.      COMPLICATIONS:  None     Viral Call DO  4:21 AM, 10/23/20

## 2020-10-23 NOTE — PROGRESS NOTES
Patient admitted on Mechanical Ventilator Protocol. Patients height measured at 62\" for an IBW 50 kg    Patient placed on the ventilator on settings as charted on flowsheeet. Ventilator Bronchodilator assessment    Breath sounds: bilateral Rhonchi throughout  Inspiratory Pressure: 20  Plateau Pressure: SHY    Patient assessed at level 2   Patient has COPD. Patient takes home meds. [x]    Bronchodilator Assessment    BRONCHODILATOR ASSESSMENT SCORE  Score 0 (Home) 1 2 3 4   Breath Sounds   []  Chronic Ventilator: Patient at baseline []  Mild Wheezes/ Clear [x]  Intermittent wheezes with good air entry []  Bilateral/unilateral wheezing with diminished air entry []  Insp/Exp wheeze and/or poor aeration   Ventilator Pressures   []  Chronic Ventilator []  Insp. Pressure less than 25 cm H20 [x]  Insp. Pressure less than 25 cm H20 []  Insp. Pressure exceeds 25 cm H20 []  Insp.  Pressure exceeds 30 cm H20   Plateau Pressure []  NA   []  Plateau Pressure less than 4  [x]  Plateau Pressure less than or equal to 5 []  Plateau Pressure greater than or equal to 6 []  Plateau Pressure greater than or equal to 8       [employer]  12:16 AM

## 2020-10-23 NOTE — PLAN OF CARE
Nutrition Problem #1: Inadequate oral intake  Intervention: Food and/or Nutrient Delivery: (Start nutrition as able; if TF, suggest Standard without Fiber goal 65 mL/hr to provide 1872 kcal and 87 g pro/day.)  Nutritional Goals: meet % of estimated nutrient needs

## 2020-10-23 NOTE — CONSULTS
[facility] - Initial Consult Note  Today's Date and Time: 10/23/2020, 9:52 AM    Impression :   · Shock etiology to be determined  · CHF with elevated ProBNP  · Pulmonary edema  · Possible superimposed pneumonia  · COPD  · Elevated Troponins  · Hypothyroidism    Recommendations:   · Rocephin 1 gm IV q 24 hr  · Zithromax 500 mg IV q 24 hr until sputum gram stain is available. Medical Decision Making/Summary/Discussion:   · Patient with CHF  · Rapid deterioration with acute hypoxia and hypercarbia requiring intubation  · CXR with rapid fluid shifts suggesting pulmonary edema. Very high ProBNP  · Can not exclude associated pneumonia at this stage. Pro calcitonin elevated  Infection Control Recommendations   · [place] Precautions    Antimicrobial Stewardship Recommendations     · Simplification of therapy  · Targeted therapy  Coordination of Outpatient Care:   · Estimated Length of IV antimicrobials:TBD  · Patient will need Midline Catheter Insertion: No  · Patient will need PICC line Insertion:Yes  · Patient will need: Home IV , [place],  SNF,  LTAC: TBD  · Patient will need outpatient wound care:No    Chief complaint/reason for consultation:   · Sepsis secondary to pneumonia      History of Present Illness:   Juan May is a 77y.o.-year-old  female who was initially admitted on 10/22/2020. Patient seen at the request of Dr. Michel Shen:    History was obtained from chart review and unobtainable from patient due to mental status. She has a history of hypothyroidism, COPD, iron deficiency anemia, presented to [facility] emergency department on 10/22 for difficulties breathing. Found to have pneumonia, and started on the sepsis protocol. At [facility]. V's, she is unable to provide much history as she is somnolent, but does follow commands. per EMR review became on the morning of 10/22, prompting her emergency department visit.   There they performed a chest x-ray as well as CT scan which was negative for any pulmonary embolism but did show bilateral infiltrates. Found to have a lactic acidosis as well, and started on Rocephin and azithromycin. Initially hypercapnic, her mentation improved on BiPAP on 10/22. CURRENT EVALUATION: 10/23/20     Febrile, T Max 100.3. Tachypnea RR> 22  Tachycardia   BP 92/54,  MAP on A line - 68. Patient sedated, paralysed and emergently intubated due to increased work of breathing and worsening mentation. Requiring pressor support, on levophed. Elevated Troponins, pro-BNP, No ST elevation on EKG, cardiology consulted. Currently on Rocephin 1g IV Q 24 hours, Azithromycin 500 mg every 24 hours. Labs:    Lactic acid 6.2    Creatinine: 0.56  BUN: 17    WBC: 9.4  Hgb: 12  Platelet: 085    Cultures:  Urine:  ·   Blood:  · 10/22/20: No growth in 20 hours  Sputum :  ·   Wound:     CSF         Discussed with patient, RN, family. I have personally reviewed the past medical history, past surgical history, medications, social history, and family history, and I have updated the database accordingly.   Past Medical History:     Past Medical History:   Diagnosis Date    COPD (chronic obstructive pulmonary disease) ([facility]     Depression     GERD (gastroesophageal reflux disease)     Osteoporosis        Past Surgical  History:     Past Surgical History:   Procedure Laterality Date    BACK SURGERY      BREAST SURGERY      CARPAL TUNNEL RELEASE      FRACTURE SURGERY Left 12/20/2018    ORIF wrist    HYSTERECTOMY      JOINT REPLACEMENT      right knee    JOINT REPLACEMENT      WRIST FRACTURE SURGERY Left 12/20/2018    WRIST OPEN REDUCTION INTERNAL FIXATION-DISTAL RADIUS performed by Merrill Hassan MD at [address]       Medications:      ipratropium-albuterol  1 ampule Inhalation Q6H    famotidine (PEPCID) injection  20 mg Intravenous BID    furosemide  40 mg Intravenous Once    azithromycin  500 mg No fevers or chills. No systemic complaints  Head: No headaches  Eyes: No double vision or blurry vision. No conjunctival inflammation. ENT: No sore throat or runny nose. . No hearing loss, tinnitus or vertigo. Cardiovascular: No chest pain or palpitations. No shortness of breath. No GONZALEZ  Lung: No shortness of breath or cough. No sputum production  Abdomen: No nausea, vomiting, diarrhea, or abdominal pain. Clenton Reas No cramps. Genitourinary: No increased urinary frequency, or dysuria. No hematuria. No suprapubic or CVA pain  Musculoskeletal: No muscle aches or pains. No joint effusions, swelling or deformities  Hematologic: No bleeding or bruising. Neurologic: No headache, weakness, numbness, or tingling. Integument: No rash, no ulcers. Psychiatric: No depression. Endocrine: No polyuria, no polydipsia, no polyphagia. Physical Examination :     Patient Vitals for the past 8 hrs:   BP Temp Temp src Pulse Resp SpO2   10/23/20 0843 -- -- -- -- 22 95 %   10/23/20 0645 (!) 114/55 -- -- 127 23 100 %   10/23/20 0630 (!) 108/57 -- -- 133 20 100 %   10/23/20 0615 (!) 85/43 -- -- 134 21 100 %   10/23/20 0600 (!) 107/43 -- -- 127 25 100 %   10/23/20 0500 (!) 137/112 -- -- 129 22 98 %   10/23/20 0438 -- -- -- -- 26 94 %   10/23/20 0400 (!) 174/96 99.4 °F (37.4 °C) Oral 126 (!) 31 96 %   10/23/20 0332 -- -- -- -- 26 100 %   10/23/20 0300 129/69 -- -- 111 23 100 %   10/23/20 0230 (!) 110/56 -- -- 104 18 --   10/23/20 0215 (!) 98/51 -- -- 103 17 100 %   10/23/20 0200 (!) 87/48 -- -- 104 28 99 %     General Appearance: Intubated, sedated, not following commands. Head:  Normocephalic, no trauma  Eyes: Pupils equal, round, reactive to light and accommodation; extraocular movements intact; sclera anicteric; conjunctivae pink. No embolic phenomena. ENT: Oropharynx clear, without erythema, exudate, or thrush. No tenderness of sinuses. Mouth/throat: mucosa pink and moist. No lesions. Dentition in good repair.   Neck:Supple, without lymphadenopathy. Thyroid normal, No bruits. Pulmonary/Chest: Auscultated on ventilator, coarse respiratory sounds. Cardiovascular: Regular rate and rhythm without murmurs, rubs, or gallops. Abdomen: Soft, non tender. Bowel sounds normal. No organomegaly  All four Extremities: No cyanosis, clubbing, edema, or effusions. Neurologic: Paralysed for intubation currently. Skin: Warm and dry with good turgor. No signs of peripheral arterial or venous insufficiency. No ulcerations. No open wounds. Medical Decision Making -Laboratory:   I have independently reviewed/ordered the following labs:    CBC with Differential:   Recent Labs     10/23/20  0400 10/23/20  0602   WBC 11.9* 9.4   HGB 12.7 12.0   HCT 39.7 38.9    227   LYMPHOPCT 14* 12*   MONOPCT 5 5     BMP:   Recent Labs     10/22/20  0825  10/23/20  0400 10/23/20  0602      < > 142 143   K 3.0*   < > 4.2 3.9      < > 107 108*   CO2 27   < > 24 23   BUN 19   < > 18 17   CREATININE 0.73   < > 0.59 0.56   MG 2.2  --   --   --     < > = values in this interval not displayed. Hepatic Function Panel:   Recent Labs     10/23/20  0400 10/23/20  0602   PROT 5.4* 5.2*   LABALBU 3.3* 3.2*   BILITOT 0.39 0.31   ALKPHOS 40 38   ALT 13 13   AST 38* 38*     No results for input(s): RPR in the last 72 hours. No results for input(s): HIV in the last 72 hours. No results for input(s): BC in the last 72 hours. Lab Results   Component Value Date    MUCUS 2+ 08/28/2019    RBC 3.98 10/23/2020    TRICHOMONAS NOT REPORTED 08/28/2019    WBC 9.4 10/23/2020    YEAST NOT REPORTED 08/28/2019    TURBIDITY CLEAR 10/22/2020     Lab Results   Component Value Date    CREATININE 0.56 10/23/2020    GLUCOSE 104 10/23/2020       Medical Decision Making-Imaging:   10/23/20:          Medical Decision Making-Other: Thank you for allowing us to participate in the care of this patient. Please call with questions.     Jorge Mina MD     ATTESTATION:    I have discussed the case, including pertinent history and exam findings with the residents and students. I have seen and examined the patient and the key elements of the encounter have been performed by me. I was present when the student obtained his information or examined the patient. I have reviewed the laboratory data, other diagnostic studies and discussed them with the residents. I have updated the medical record where necessary. I agree with the assessment, plan and orders as documented by the resident/ student.     Sierra Adams MD.    Pager: [phone] - Office: [phone]

## 2020-10-23 NOTE — CONSULTS
medical record    HISTORY OF PRESENT ILLNESS:      The patient is a 77 y.o.  female who is admitted to the hospital for acute hypoxic respiratory failure. 78-year-old female with past medical history of rheumatoid arthritis on hydroxychloroquine, hypothyroidism on Synthroid 150 mcg daily, on Lasix as needed for edema was transferred to [facility] for evaluation of acute hypoxic respiratory failure. As per chart review patient started having shortness of breath since yesterday evening associated with cough.  also noticed the patient to be confused, fever. Patient found to be wheezing in the emergency department. Patient initially was on noninvasive ventilation and later intubated in the ICU later because of respiratory decompensation. Chest x-ray was done which showed bilateral pulmonary infiltrates, CT PE was done which showed no PE but extensive bilateral infiltrates and bilateral lower lobe consolidation and possible esophagitis and hiatal hernia. Found to have a T-max of 100.3 here. Patient also found to be hypotensive on Levophed. Cardiology is consulted for elevated troponins. Trend was 37-. EKG was done which showed normal sinus rhythm, no ST-T wave changes. Patient was started on low-dose heparin drip. Past Medical History:   has a past medical history of COPD (chronic obstructive pulmonary disease) ([address] Depression, GERD (gastroesophageal reflux disease), and Osteoporosis. Past Surgical History:   has a past surgical history that includes Hysterectomy; back surgery; Breast surgery; Carpal tunnel release; joint replacement; joint replacement; fracture surgery (Left, 12/20/2018); and Wrist fracture surgery (Left, 12/20/2018). Home Medications:    Prior to Admission medications    Medication Sig Start Date End Date Taking?  Authorizing Provider   furosemide (LASIX) 40 MG tablet Take 1 tablet by mouth as needed (edema) 8/21/20   RYAN Aguirre - CNP Hydroxychloroquine Sulfate (PLAQUENIL PO) Take by mouth    Historical Provider, MD   oxyCODONE-acetaminophen (PERCOCET) 5-325 MG per tablet Take 1 tablet by mouth every 4 hours as needed for Pain. Historical Provider, MD [place]     Historical Provider, MD   albuterol sulfate  (90 Base) MCG/ACT inhaler Inhale 2 puffs into the lungs 4 times daily 10/8/18   Anastacio Santos PA-C   levothyroxine (SYNTHROID) 150 MCG tablet Take 150 mcg by mouth Daily    Historical Provider, MD   aspirin 81 MG tablet Take 81 mg by mouth daily    Historical Provider, MD   DULoxetine (CYMBALTA) 60 MG extended release capsule Take 60 mg by mouth daily    Historical Provider, MD   traZODone (DESYREL) 100 MG tablet Take 200 mg by mouth nightly     Historical Provider, MD   pantoprazole sodium (PROTONIX) 40 MG PACK packet Take 40 mg by mouth 2 times daily (before meals)    Historical Provider, MD   potassium chloride (KLOR-CON) 20 MEQ packet Take 20 mEq by mouth 2 times daily    Historical Provider, MD   pregabalin (LYRICA) 100 MG capsule Take 300 mg by mouth 2 times daily. Josefina Boothe Historical Provider, MD   calcium citrate-vitamin D (CITRICAL + D) 315-250 MG-UNIT TABS per tablet Take 1 tablet by mouth 2 times daily (with meals)    Historical Provider, MD   HYDROcodone-acetaminophen (NORCO)  MG per tablet Take 1 tablet by mouth every 8 hours as needed for Pain. Josefina Boothe     Historical Provider, MD   alendronate (FOSAMAX) 70 MG tablet Take 70 mg by mouth every 7 days    Historical Provider, MD      Current Facility-Administered Medications: albuterol (PROVENTIL) nebulizer solution 2.5 mg, 2.5 mg, Nebulization, As Directed RT PRN  ipratropium-albuterol (DUONEB) nebulizer solution 1 ampule, 1 ampule, Inhalation, Q6H  norepinephrine (LEVOPHED) 16 mg in sodium chloride 0.9 % 250 mL infusion, 2 mcg/min, Intravenous, Continuous  heparin 25,000 units in dextrose 5% 250 mL infusion, 12 Units/kg/hr, Intravenous, Continuous  fentaNYL 20 mcg/mL Infusion, 100 mcg/hr, Intravenous, Continuous  famotidine (PEPCID) injection 20 mg, 20 mg, Intravenous, BID  cisatracurium besylate (NIMBEX) 200 mg in sodium chloride 0.9 % 100 mL infusion, 5 mcg/kg/min, Intravenous, Continuous  heparin (porcine) injection 5,710 Units, 80 Units/kg, Intravenous, PRN  heparin (porcine) injection 2,860 Units, 40 Units/kg, Intravenous, PRN  midazolam (VERSED) 1 mg/mL in D5W infusion, 2 mg/hr, Intravenous, Continuous  azithromycin (ZITHROMAX) 500 mg in dextrose 5% 250 mL IVPB, 500 mg, Intravenous, Q24H  cefTRIAXone (ROCEPHIN) 1 g IVPB in 50 mL D5W minibag, 1 g, Intravenous, Q24H  aspirin EC tablet 81 mg, 81 mg, Oral, Daily  levothyroxine (SYNTHROID) tablet 150 mcg, 150 mcg, Oral, Daily  potassium chloride (KLOR-CON M) extended release tablet 20 mEq, 20 mEq, Oral, BID  pregabalin (LYRICA) capsule 300 mg, 300 mg, Oral, BID  sodium chloride flush 0.9 % injection 10 mL, 10 mL, Intravenous, 2 times per day  sodium chloride flush 0.9 % injection 10 mL, 10 mL, Intravenous, PRN  acetaminophen (TYLENOL) tablet 650 mg, 650 mg, Oral, Q4H PRN  [Held by provider] enoxaparin (LOVENOX) injection 40 mg, 40 mg, Subcutaneous, Daily  ondansetron (ZOFRAN) injection 4 mg, 4 mg, Intravenous, Q8H PRN  [Held by provider] HYDROcodone-acetaminophen (NORCO) 5-325 MG per tablet 2 tablet, 2 tablet, Oral, Q8H PRN  potassium chloride 10 mEq/100 mL IVPB (Peripheral Line), 10 mEq, Intravenous, PRN  dextrose 5 % and 0.45 % NaCl with KCl 20 mEq infusion, , Intravenous, Continuous  propofol injection, 10 mcg/kg/min, Intravenous, Titrated    Allergies:  Patient has no known allergies. Social History:   reports that she quit smoking about 43 years ago. She has never used smokeless tobacco. She reports that she does not drink alcohol or use drugs. Family History: family history is not on file. No h/o sudden cardiac death. No for premature CAD    REVIEW OF SYSTEMS:    · Unable to obtain because patient is intubated      PHYSICAL EXAM:      BP (!) 174/96   Pulse 126   Temp 99.4 °F (37.4 °C) (Oral)   Resp 26   Ht 5' 4\" (1.626 m)   Wt 157 lb 6.5 oz (71.4 kg)   SpO2 94%   BMI 27.02 kg/m²    Constitutional and General Appearance: Intubated, sedated not following any commands   HEENT: PERRL, no cervical lymphadenopathy. No masses palpable. Normal oral mucosa  Respiratory:  · Normal excursion and expansion without use of accessory muscles  · Resp Auscultation: Good respiratory effort. No for increased work of breathing. On auscultation: clear to auscultation bilaterally  Cardiovascular:  · The apical impulse is not displaced  · Heart tones are crisp and normal. regular S1 and S2.  · Jugular venous pulsation Normal  · The carotid upstroke is normal in amplitude and contour without delay or bruit  · Peripheral pulses are symmetrical and full   Abdomen:   · No masses or tenderness  · Bowel sounds present  Extremities:  ·  No Cyanosis or Clubbing  ·  Lower extremity edema: No  ·  Skin: Warm and dry  Neurological:  · Alert and oriented. · Moves all extremities well  · No abnormalities of mood, affect, memory, mentation, or behavior are noted    DATA:    Diagnostics:      EKG:   Normal sinus rhythm, no ST-T wave changes    ECHO: 6/19    Global left ventricular systolic function appears preserved with an  estimated ejection fraction of 55%. Mildly increased left ventricular wall thickness with a normal left  ventricular cavity size. Normal mitral valve structure with mild mitral regurgitation.   Evidence of mild (grade I) diastolic dysfunction is seen      Labs:     CBC:   Recent Labs     10/22/20  2017 10/23/20  0400   WBC 7.2 PENDING   HGB 13.3 12.7   HCT 43.2 39.7   PLT See Reflexed IPF Result PENDING     BMP:   Recent Labs     10/22/20  2017 10/23/20  0400    142   K 3.4* 4.2   CO2 20 24   BUN 20 18   CREATININE 0.62 0.59   LABGLOM >60 >60   GLUCOSE 107* 110*     BNP: No results for input(s): BNP in the last 72 hours.  PT/INR:   Recent Labs     10/22/20  0825   PROTIME 12.7   INR 1.0     APTT:  Recent Labs     10/22/20  0825 10/23/20  0401   APTT 22.1* 29.9     CARDIAC ENZYMES:No results for input(s): CKTOTAL, CKMB, CKMBINDEX, TROPONINI in the last 72 hours. FASTING LIPID PANEL:No results found for: HDL, LDLDIRECT, LDLCALC, TRIG  LIVER PROFILE:  Recent Labs     10/22/20  2017 10/23/20  0400   AST 34* 38*   ALT 12 13   LABALBU 3.2* 3.3*       IMPRESSION:    1. Acute hypoxic respiratory failure possibly secondary to bilateral lobe pneumonia, possibly viral versus atypical pneumonia  2. Rheumatoid arthritis on Plaquenil  3. No cardiac evaluation in the past  4. NSTEMI      RECOMMENDATIONS:  1. Possibly type II MI secondary to hypoxia, tachycardia but cannot rule out type I MI  2. Septic shock secondary to above on Levophed  3. Trend troponin  4. Continue low-dose heparin drip  5. Continue aspirin 81 mg daily, atorvastatin 80 mg daily  6. Follow-up echo  7. Antibiotics as per primary    Will discuss with rounding attending Dr. Ann Maldonado for final recommendations.     Sarah Jackson MD,  Internal Medicine Resident, PGY-3,   [address]  10/23/2020,6:10 AM

## 2020-10-24 ENCOUNTER — APPOINTMENT (OUTPATIENT)
Dept: GENERAL RADIOLOGY | Age: 75
DRG: 870 | End: 2020-10-24
Attending: INTERNAL MEDICINE
Payer: MEDICARE

## 2020-10-24 PROBLEM — R65.20 SEVERE SEPSIS (HCC): Status: ACTIVE | Noted: 2020-10-22

## 2020-10-24 LAB
ABSOLUTE EOS #: 0 K/UL (ref 0–0.4)
ABSOLUTE IMMATURE GRANULOCYTE: 0.67 K/UL (ref 0–0.3)
ABSOLUTE LYMPH #: 0.84 K/UL (ref 1–4.8)
ABSOLUTE MONO #: 0.5 K/UL (ref 0.1–0.8)
ALBUMIN SERPL-MCNC: 2.7 G/DL (ref 3.5–5.2)
ALBUMIN/GLOBULIN RATIO: 1 (ref 1–2.5)
ALLEN TEST: ABNORMAL
ALLEN TEST: ABNORMAL
ALP BLD-CCNC: 55 U/L (ref 35–104)
ALT SERPL-CCNC: 11 U/L (ref 5–33)
ANION GAP SERPL CALCULATED.3IONS-SCNC: 9 MMOL/L (ref 9–17)
AST SERPL-CCNC: 23 U/L
BASOPHILS # BLD: 0 % (ref 0–2)
BASOPHILS ABSOLUTE: 0 K/UL (ref 0–0.2)
BILIRUB SERPL-MCNC: 0.36 MG/DL (ref 0.3–1.2)
BUN BLDV-MCNC: 14 MG/DL (ref 8–23)
BUN/CREAT BLD: ABNORMAL (ref 9–20)
CALCIUM IONIZED: 1.17 MMOL/L (ref 1.13–1.33)
CALCIUM SERPL-MCNC: 8.1 MG/DL (ref 8.6–10.4)
CHLORIDE BLD-SCNC: 103 MMOL/L (ref 98–107)
CO2: 25 MMOL/L (ref 20–31)
CORTISOL COLLECTION INFO: ABNORMAL
CORTISOL: 79.5 UG/DL (ref 2.7–18.4)
CREAT SERPL-MCNC: 0.53 MG/DL (ref 0.5–0.9)
DIFFERENTIAL TYPE: ABNORMAL
EKG ATRIAL RATE: 105 BPM
EKG ATRIAL RATE: 124 BPM
EKG ATRIAL RATE: 126 BPM
EKG ATRIAL RATE: 187 BPM
EKG ATRIAL RATE: 286 BPM
EKG P AXIS: 113 DEGREES
EKG P AXIS: 68 DEGREES
EKG P AXIS: 72 DEGREES
EKG P-R INTERVAL: 162 MS
EKG P-R INTERVAL: 172 MS
EKG P-R INTERVAL: 184 MS
EKG Q-T INTERVAL: 270 MS
EKG Q-T INTERVAL: 288 MS
EKG Q-T INTERVAL: 314 MS
EKG Q-T INTERVAL: 332 MS
EKG Q-T INTERVAL: 350 MS
EKG QRS DURATION: 78 MS
EKG QRS DURATION: 80 MS
EKG QRS DURATION: 82 MS
EKG QRS DURATION: 84 MS
EKG QRS DURATION: 86 MS
EKG QTC CALCULATION (BAZETT): 417 MS
EKG QTC CALCULATION (BAZETT): 432 MS
EKG QTC CALCULATION (BAZETT): 438 MS
EKG QTC CALCULATION (BAZETT): 451 MS
EKG QTC CALCULATION (BAZETT): 467 MS
EKG R AXIS: -160 DEGREES
EKG R AXIS: -17 DEGREES
EKG R AXIS: -21 DEGREES
EKG R AXIS: -7 DEGREES
EKG R AXIS: -8 DEGREES
EKG T AXIS: 111 DEGREES
EKG T AXIS: 66 DEGREES
EKG T AXIS: 67 DEGREES
EKG T AXIS: 73 DEGREES
EKG T AXIS: 81 DEGREES
EKG VENTRICULAR RATE: 105 BPM
EKG VENTRICULAR RATE: 107 BPM
EKG VENTRICULAR RATE: 124 BPM
EKG VENTRICULAR RATE: 126 BPM
EKG VENTRICULAR RATE: 154 BPM
EOSINOPHILS RELATIVE PERCENT: 0 % (ref 1–4)
FIO2: 50
FIO2: 55
GFR AFRICAN AMERICAN: >60 ML/MIN
GFR NON-AFRICAN AMERICAN: >60 ML/MIN
GFR SERPL CREATININE-BSD FRML MDRD: ABNORMAL ML/MIN/{1.73_M2}
GFR SERPL CREATININE-BSD FRML MDRD: ABNORMAL ML/MIN/{1.73_M2}
GLUCOSE BLD-MCNC: 157 MG/DL (ref 70–99)
HCT VFR BLD CALC: 39.5 % (ref 36.3–47.1)
HEMOGLOBIN: 12.1 G/DL (ref 11.9–15.1)
IMMATURE GRANULOCYTES: 4 %
LACTIC ACID, WHOLE BLOOD: 3.1 MMOL/L (ref 0.7–2.1)
LACTIC ACID, WHOLE BLOOD: 3.4 MMOL/L (ref 0.7–2.1)
LYMPHOCYTES # BLD: 5 % (ref 24–44)
MAGNESIUM: 2 MG/DL (ref 1.6–2.6)
MCH RBC QN AUTO: 30.8 PG (ref 25.2–33.5)
MCHC RBC AUTO-ENTMCNC: 30.6 G/DL (ref 28.4–34.8)
MCV RBC AUTO: 100.5 FL (ref 82.6–102.9)
MODE: ABNORMAL
MODE: ABNORMAL
MONOCYTES # BLD: 3 % (ref 1–7)
MORPHOLOGY: ABNORMAL
MORPHOLOGY: ABNORMAL
NEGATIVE BASE EXCESS, ART: ABNORMAL (ref 0–2)
NEGATIVE BASE EXCESS, ART: ABNORMAL (ref 0–2)
NRBC AUTOMATED: 0 PER 100 WBC
O2 DEVICE/FLOW/%: ABNORMAL
O2 DEVICE/FLOW/%: ABNORMAL
PARTIAL THROMBOPLASTIN TIME: 45.2 SEC (ref 20.5–30.5)
PARTIAL THROMBOPLASTIN TIME: 49.3 SEC (ref 20.5–30.5)
PARTIAL THROMBOPLASTIN TIME: 51.3 SEC (ref 20.5–30.5)
PATIENT TEMP: ABNORMAL
PATIENT TEMP: ABNORMAL
PDW BLD-RTO: 14.2 % (ref 11.8–14.4)
PLATELET # BLD: 249 K/UL (ref 138–453)
PLATELET ESTIMATE: ABNORMAL
PMV BLD AUTO: 10.4 FL (ref 8.1–13.5)
POC HCO3: 27.8 MMOL/L (ref 21–28)
POC HCO3: 29.5 MMOL/L (ref 21–28)
POC O2 SATURATION: 95 % (ref 94–98)
POC O2 SATURATION: 95 % (ref 94–98)
POC PCO2 TEMP: ABNORMAL MM HG
POC PCO2 TEMP: ABNORMAL MM HG
POC PCO2: 54.9 MM HG (ref 35–48)
POC PCO2: 64.2 MM HG (ref 35–48)
POC PH TEMP: ABNORMAL
POC PH TEMP: ABNORMAL
POC PH: 7.27 (ref 7.35–7.45)
POC PH: 7.31 (ref 7.35–7.45)
POC PO2 TEMP: ABNORMAL MM HG
POC PO2 TEMP: ABNORMAL MM HG
POC PO2: 84.7 MM HG (ref 83–108)
POC PO2: 86 MM HG (ref 83–108)
POSITIVE BASE EXCESS, ART: 1 (ref 0–3)
POSITIVE BASE EXCESS, ART: 1 (ref 0–3)
POTASSIUM SERPL-SCNC: 4.3 MMOL/L (ref 3.7–5.3)
RBC # BLD: 3.93 M/UL (ref 3.95–5.11)
RBC # BLD: ABNORMAL 10*6/UL
SAMPLE SITE: ABNORMAL
SAMPLE SITE: ABNORMAL
SEG NEUTROPHILS: 88 % (ref 36–66)
SEGMENTED NEUTROPHILS ABSOLUTE COUNT: 14.79 K/UL (ref 1.8–7.7)
SODIUM BLD-SCNC: 137 MMOL/L (ref 135–144)
TCO2 (CALC), ART: 30 MMOL/L (ref 22–29)
TCO2 (CALC), ART: 31 MMOL/L (ref 22–29)
TOTAL PROTEIN: 5.3 G/DL (ref 6.4–8.3)
TRIGL SERPL-MCNC: 153 MG/DL
TROPONIN INTERP: ABNORMAL
TROPONIN INTERP: ABNORMAL
TROPONIN T: ABNORMAL NG/ML
TROPONIN T: ABNORMAL NG/ML
TROPONIN, HIGH SENSITIVITY: 119 NG/L (ref 0–14)
TROPONIN, HIGH SENSITIVITY: 136 NG/L (ref 0–14)
TSH SERPL DL<=0.05 MIU/L-ACNC: 0.69 MIU/L (ref 0.3–5)
WBC # BLD: 16.8 K/UL (ref 3.5–11.3)
WBC # BLD: ABNORMAL 10*3/UL

## 2020-10-24 PROCEDURE — 99232 SBSQ HOSP IP/OBS MODERATE 35: CPT | Performed by: INTERNAL MEDICINE

## 2020-10-24 PROCEDURE — 2580000003 HC RX 258: Performed by: STUDENT IN AN ORGANIZED HEALTH CARE EDUCATION/TRAINING PROGRAM

## 2020-10-24 PROCEDURE — 6370000000 HC RX 637 (ALT 250 FOR IP): Performed by: STUDENT IN AN ORGANIZED HEALTH CARE EDUCATION/TRAINING PROGRAM

## 2020-10-24 PROCEDURE — 82803 BLOOD GASES ANY COMBINATION: CPT

## 2020-10-24 PROCEDURE — 2500000003 HC RX 250 WO HCPCS: Performed by: STUDENT IN AN ORGANIZED HEALTH CARE EDUCATION/TRAINING PROGRAM

## 2020-10-24 PROCEDURE — 93010 ELECTROCARDIOGRAM REPORT: CPT | Performed by: INTERNAL MEDICINE

## 2020-10-24 PROCEDURE — 6360000002 HC RX W HCPCS: Performed by: STUDENT IN AN ORGANIZED HEALTH CARE EDUCATION/TRAINING PROGRAM

## 2020-10-24 PROCEDURE — 94770 HC ETCO2 MONITOR DAILY: CPT

## 2020-10-24 PROCEDURE — 85730 THROMBOPLASTIN TIME PARTIAL: CPT

## 2020-10-24 PROCEDURE — 84484 ASSAY OF TROPONIN QUANT: CPT

## 2020-10-24 PROCEDURE — 2000000000 HC ICU R&B

## 2020-10-24 PROCEDURE — 83735 ASSAY OF MAGNESIUM: CPT

## 2020-10-24 PROCEDURE — 87186 SC STD MICRODIL/AGAR DIL: CPT

## 2020-10-24 PROCEDURE — 85025 COMPLETE CBC W/AUTO DIFF WBC: CPT

## 2020-10-24 PROCEDURE — 71045 X-RAY EXAM CHEST 1 VIEW: CPT

## 2020-10-24 PROCEDURE — 87205 SMEAR GRAM STAIN: CPT

## 2020-10-24 PROCEDURE — 87070 CULTURE OTHR SPECIMN AEROBIC: CPT

## 2020-10-24 PROCEDURE — 89220 SPUTUM SPECIMEN COLLECTION: CPT

## 2020-10-24 PROCEDURE — 84443 ASSAY THYROID STIM HORMONE: CPT

## 2020-10-24 PROCEDURE — 87077 CULTURE AEROBIC IDENTIFY: CPT

## 2020-10-24 PROCEDURE — 83605 ASSAY OF LACTIC ACID: CPT

## 2020-10-24 PROCEDURE — 99291 CRITICAL CARE FIRST HOUR: CPT | Performed by: INTERNAL MEDICINE

## 2020-10-24 PROCEDURE — 82330 ASSAY OF CALCIUM: CPT

## 2020-10-24 PROCEDURE — 37799 UNLISTED PX VASCULAR SURGERY: CPT

## 2020-10-24 PROCEDURE — 94640 AIRWAY INHALATION TREATMENT: CPT

## 2020-10-24 PROCEDURE — 82533 TOTAL CORTISOL: CPT

## 2020-10-24 PROCEDURE — 94761 N-INVAS EAR/PLS OXIMETRY MLT: CPT

## 2020-10-24 PROCEDURE — 2700000000 HC OXYGEN THERAPY PER DAY

## 2020-10-24 PROCEDURE — 93005 ELECTROCARDIOGRAM TRACING: CPT | Performed by: INTERNAL MEDICINE

## 2020-10-24 PROCEDURE — 80053 COMPREHEN METABOLIC PANEL: CPT

## 2020-10-24 PROCEDURE — 84478 ASSAY OF TRIGLYCERIDES: CPT

## 2020-10-24 PROCEDURE — 94003 VENT MGMT INPAT SUBQ DAY: CPT

## 2020-10-24 RX ORDER — FUROSEMIDE 10 MG/ML
20 INJECTION INTRAMUSCULAR; INTRAVENOUS ONCE
Status: COMPLETED | OUTPATIENT
Start: 2020-10-24 | End: 2020-10-24

## 2020-10-24 RX ADMIN — CEFTRIAXONE SODIUM 1 G: 1 INJECTION, POWDER, FOR SOLUTION INTRAMUSCULAR; INTRAVENOUS at 07:55

## 2020-10-24 RX ADMIN — Medication 81 MG: at 07:55

## 2020-10-24 RX ADMIN — HYDROCORTISONE SODIUM SUCCINATE 100 MG: 100 INJECTION, POWDER, FOR SOLUTION INTRAMUSCULAR; INTRAVENOUS at 07:55

## 2020-10-24 RX ADMIN — IPRATROPIUM BROMIDE AND ALBUTEROL SULFATE 1 AMPULE: .5; 3 SOLUTION RESPIRATORY (INHALATION) at 20:02

## 2020-10-24 RX ADMIN — AMIODARONE HYDROCHLORIDE 0.5 MG/MIN: 50 INJECTION, SOLUTION INTRAVENOUS at 06:04

## 2020-10-24 RX ADMIN — FAMOTIDINE 20 MG: 10 INJECTION INTRAVENOUS at 07:55

## 2020-10-24 RX ADMIN — FUROSEMIDE 20 MG: 10 INJECTION, SOLUTION INTRAMUSCULAR; INTRAVENOUS at 10:01

## 2020-10-24 RX ADMIN — POTASSIUM CHLORIDE, DEXTROSE MONOHYDRATE AND SODIUM CHLORIDE: 150; 5; 450 INJECTION, SOLUTION INTRAVENOUS at 04:07

## 2020-10-24 RX ADMIN — SODIUM CHLORIDE, PRESERVATIVE FREE 10 ML: 5 INJECTION INTRAVENOUS at 07:45

## 2020-10-24 RX ADMIN — IPRATROPIUM BROMIDE AND ALBUTEROL SULFATE 1 AMPULE: .5; 3 SOLUTION RESPIRATORY (INHALATION) at 03:50

## 2020-10-24 RX ADMIN — Medication 125 MCG/HR: at 17:14

## 2020-10-24 RX ADMIN — HYDROCORTISONE SODIUM SUCCINATE 100 MG: 100 INJECTION, POWDER, FOR SOLUTION INTRAMUSCULAR; INTRAVENOUS at 01:29

## 2020-10-24 RX ADMIN — VASOPRESSIN 0.03 UNITS/MIN: 20 INJECTION INTRAVENOUS at 01:42

## 2020-10-24 RX ADMIN — PREGABALIN 300 MG: 100 CAPSULE ORAL at 07:55

## 2020-10-24 RX ADMIN — HEPARIN SODIUM 16 UNITS/KG/HR: 10000 INJECTION, SOLUTION INTRAVENOUS at 04:04

## 2020-10-24 RX ADMIN — HEPARIN SODIUM 2860 UNITS: 1000 INJECTION INTRAVENOUS; SUBCUTANEOUS at 12:10

## 2020-10-24 RX ADMIN — AMIODARONE HYDROCHLORIDE 0.5 MG/MIN: 50 INJECTION, SOLUTION INTRAVENOUS at 02:17

## 2020-10-24 RX ADMIN — Medication 200 MCG/HR: at 06:24

## 2020-10-24 RX ADMIN — IPRATROPIUM BROMIDE AND ALBUTEROL SULFATE 1 AMPULE: .5; 3 SOLUTION RESPIRATORY (INHALATION) at 15:20

## 2020-10-24 RX ADMIN — AZITHROMYCIN MONOHYDRATE 500 MG: 500 INJECTION, POWDER, LYOPHILIZED, FOR SOLUTION INTRAVENOUS at 09:10

## 2020-10-24 RX ADMIN — Medication 200 MCG/HR: at 11:25

## 2020-10-24 RX ADMIN — CISATRACURIUM BESYLATE 3 MCG/KG/MIN: 200 INJECTION INTRAVENOUS at 15:07

## 2020-10-24 RX ADMIN — PREGABALIN 300 MG: 100 CAPSULE ORAL at 21:00

## 2020-10-24 RX ADMIN — IPRATROPIUM BROMIDE AND ALBUTEROL SULFATE 1 AMPULE: .5; 3 SOLUTION RESPIRATORY (INHALATION) at 08:05

## 2020-10-24 RX ADMIN — LEVOTHYROXINE SODIUM 150 MCG: 75 TABLET ORAL at 07:55

## 2020-10-24 RX ADMIN — HEPARIN SODIUM 2860 UNITS: 1000 INJECTION INTRAVENOUS; SUBCUTANEOUS at 21:03

## 2020-10-24 RX ADMIN — FAMOTIDINE 20 MG: 10 INJECTION INTRAVENOUS at 21:00

## 2020-10-24 RX ADMIN — Medication 8 MG/HR: at 06:58

## 2020-10-24 RX ADMIN — Medication 200 MCG/HR: at 01:22

## 2020-10-24 RX ADMIN — HYDROCORTISONE SODIUM SUCCINATE 100 MG: 100 INJECTION, POWDER, FOR SOLUTION INTRAMUSCULAR; INTRAVENOUS at 16:59

## 2020-10-24 RX ADMIN — AMIODARONE HYDROCHLORIDE 0.5 MG/MIN: 50 INJECTION, SOLUTION INTRAVENOUS at 23:57

## 2020-10-24 RX ADMIN — POTASSIUM CHLORIDE, DEXTROSE MONOHYDRATE AND SODIUM CHLORIDE: 150; 5; 450 INJECTION, SOLUTION INTRAVENOUS at 18:59

## 2020-10-24 RX ADMIN — PHENYLEPHRINE HYDROCHLORIDE 100 MCG/MIN: 10 INJECTION INTRAVENOUS at 05:25

## 2020-10-24 RX ADMIN — VASOPRESSIN 0.04 UNITS/MIN: 20 INJECTION INTRAVENOUS at 21:50

## 2020-10-24 RX ADMIN — PHENYLEPHRINE HYDROCHLORIDE 40 MCG/MIN: 10 INJECTION INTRAVENOUS at 17:15

## 2020-10-24 ASSESSMENT — PULMONARY FUNCTION TESTS
PIF_VALUE: 27
PIF_VALUE: 28
PIF_VALUE: 27
PIF_VALUE: 27
PIF_VALUE: 28
PIF_VALUE: 27
PIF_VALUE: 28
PIF_VALUE: 27
PIF_VALUE: 28
PIF_VALUE: 26
PIF_VALUE: 27
PIF_VALUE: 26
PIF_VALUE: 27

## 2020-10-24 NOTE — PLAN OF CARE
Problem: OXYGENATION/RESPIRATORY FUNCTION  Goal: Patient will maintain patent airway  10/24/2020 0926 by Parvin Stevenson RCP  Outcome: Ongoing     Problem: OXYGENATION/RESPIRATORY FUNCTION  Goal: Patient will achieve/maintain normal respiratory rate/effort  Description: Respiratory rate and effort will be within normal limits for the patient  10/24/2020 0926 by Parvin Stevenson RCP  Outcome: Ongoing     Problem: MECHANICAL VENTILATION  Goal: Patient will maintain patent airway  10/24/2020 0926 by Parvin Stevenson RCP  Outcome: Ongoing     Problem: MECHANICAL VENTILATION  Goal: Oral health is maintained or improved  10/24/2020 0926 by Parvin Stevenson RCP  Outcome: Ongoing     Problem: MECHANICAL VENTILATION  Goal: ET tube will be managed safely  10/24/2020 0926 by Parvin Stevenson RCP  Outcome: Ongoing     Problem: MECHANICAL VENTILATION  Goal: Ability to express needs and understand communication  10/24/2020 0926 by Parvin Stevenson RCP  Outcome: Ongoing     Problem: SKIN INTEGRITY  Goal: Skin integrity is maintained or improved  10/24/2020 0926 by Parvin Stevenson RCP  Outcome: Ongoing     Problem: SKIN INTEGRITY  Goal: Skin integrity is maintained or improved  10/24/2020 0926 by Parvin Stevenson RCP  Outcome: Ongoing

## 2020-10-24 NOTE — PLAN OF CARE
Problem: Skin Integrity:  Goal: Will show no infection signs and symptoms  Description: Will show no infection signs and symptoms  10/24/2020 0937 by eMrrill Swain RN  Outcome: Ongoing  10/24/2020 0324 by La Harvey RN  Outcome: Ongoing  Goal: Absence of new skin breakdown  Description: Absence of new skin breakdown  10/24/2020 0937 by Merrill Swain RN  Outcome: Ongoing  10/24/2020 0324 by La Harvey RN  Outcome: Ongoing     Problem: Falls - Risk of:  Goal: Will remain free from falls  Description: Will remain free from falls  10/24/2020 0937 by Merrill Swain RN  Outcome: Ongoing  10/24/2020 0324 by La Harvey RN  Outcome: Ongoing  Goal: Absence of physical injury  Description: Absence of physical injury  10/24/2020 0937 by Merrill Swain RN  Outcome: Ongoing  10/24/2020 0324 by La Harvey RN  Outcome: Ongoing     Problem: Pain:  Goal: Pain level will decrease  Description: Pain level will decrease  10/24/2020 0937 by Merrill Swain RN  Outcome: Ongoing  10/24/2020 0324 by La Harvey RN  Outcome: Ongoing  Goal: Control of acute pain  Description: Control of acute pain  10/24/2020 0937 by Merrill Swain RN  Outcome: Ongoing  10/24/2020 0324 by La Harvey RN  Outcome: Ongoing  Goal: Control of chronic pain  Description: Control of chronic pain  10/24/2020 0937 by Merrill Swain RN  Outcome: Ongoing  10/24/2020 0324 by La Harvey RN  Outcome: Ongoing     Problem: OXYGENATION/RESPIRATORY FUNCTION  Goal: Patient will maintain patent airway  10/24/2020 0926 by [facility]  Outcome: Ongoing  10/24/2020 0807 by Guy Wilson RCP  Outcome: Ongoing  10/24/2020 0324 by La Harvey RN  Outcome: Ongoing  10/24/2020 0157 by Chris Roque RCP  Outcome: Ongoing  Goal: Patient will achieve/maintain normal respiratory rate/effort  Description: Respiratory rate and effort will be within normal limits for the patient  10/24/2020 0926 by Renetta Fournier Gilda, RCP  Outcome: Ongoing  10/24/2020 0807 by Alan Carrillo, RCP  Outcome: Ongoing  10/24/2020 0324 by Manny Whittington RN  Outcome: Ongoing  10/24/2020 0157 by Ashley Jack, RCP  Outcome: Ongoing     Problem: MECHANICAL VENTILATION  Goal: Patient will maintain patent airway  10/24/2020 0926 by Alan Carrillo, RCP  Outcome: Ongoing  10/24/2020 0807 by Alan Carrillo, RCP  Outcome: Ongoing  10/24/2020 0324 by Manny Whittington, MARVIN  Outcome: Ongoing  10/24/2020 0157 by Ashley Jack RCP  Outcome: Ongoing  Goal: Oral health is maintained or improved  10/24/2020 0926 by Alan Carrillo, RCP  Outcome: Ongoing  10/24/2020 0807 by Alan Carrillo, RCP  Outcome: Ongoing  10/24/2020 0324 by Manny Whittington, MARVIN  Outcome: Ongoing  10/24/2020 0157 by Ashley Jack, RCP  Outcome: Ongoing  Goal: ET tube will be managed safely  10/24/2020 0926 by Alan Carrillo, RCP  Outcome: Ongoing  10/24/2020 0807 by Alan Carrillo, RCP  Outcome: Ongoing  10/24/2020 0324 by Manny Whittington RN  Outcome: Ongoing  10/24/2020 0157 by Ashley Jack RCP  Outcome: Ongoing  Goal: Ability to express needs and understand communication  10/24/2020 0926 by Alan Carrillo, RCP  Outcome: Ongoing  10/24/2020 0807 by Alan Carrillo, RCP  Outcome: Ongoing  10/24/2020 0324 by Manny Whittington, MARVIN  Outcome: Ongoing  10/24/2020 0157 by Ashley Jack, RCP  Outcome: Ongoing  Goal: Mobility/activity is maintained at optimum level for patient  10/24/2020 0926 by Alan Carrillo, RCP  Outcome: Ongoing  10/24/2020 0807 by Alan Carrillo, RCP  Outcome: Ongoing  10/24/2020 0324 by Manny Whittington, MARVIN  Outcome: Ongoing  10/24/2020 0157 by Ashley Jack, RCP  Outcome: Ongoing     Problem: SKIN INTEGRITY  Goal: Skin integrity is maintained or improved  10/24/2020 0926 by Alan Carrillo, RCP  Outcome: Ongoing  10/24/2020 0807 by Alan Carrillo, RCP  Outcome: Ongoing  10/24/2020 0324 by Annamarie Zuluaga RN  Outcome: Ongoing  10/24/2020 0157 by Martha Salgado RCP  Outcome: Ongoing     Problem: Nutrition  Goal: Optimal nutrition therapy  Description: Nutrition Problem #1: Inadequate oral intake  Intervention: Food and/or Nutrient Delivery: Start nutrition as able; if TF, suggest Standard without Fiber goal 65 mL/hr to provide 1872 kcal and 87 g pro/day.   Nutritional Goals: meet % of estimated nutrient needs     10/24/2020 0324 by Annamarie Zuluaga RN  Outcome: Ongoing

## 2020-10-24 NOTE — PROGRESS NOTES
negative for any pulmonary embolism but did show bilateral infiltrates. Found to have a lactic acidosis as well, and started on Rocephin and azithromycin. Initially hypercapnic, her mentation improved on BiPAP on 10/22. CURRENT EVALUATION: 10/24/20     On pressors x 2 - sedatives  Fever better today  abd soft and right fem line clean site  Amiodarone and HR regular at 72  No rash  WBC 16  No new pos cx  CXR 10/24 increased bilat infiltrates    Labs:    Lactic acid 6.2    Creatinine: 0.56  BUN: 17    WBC: 9.4  Hgb: 12  Platelet: 425    Cultures:  Urine:  ·   Blood:  · 10/22/20: No growth in 20 hours  Sputum :  ·   Wound:     CSF         Discussed with patient, RN, family. I have personally reviewed the past medical history, past surgical history, medications, social history, and family history, and I have updated the database accordingly.   Past Medical History:     Past Medical History:   Diagnosis Date    COPD (chronic obstructive pulmonary disease) ([facility] [address]     Depression     GERD (gastroesophageal reflux disease)     Osteoporosis        Past Surgical  History:     Past Surgical History:   Procedure Laterality Date    BACK SURGERY      BREAST SURGERY      CARPAL TUNNEL RELEASE      FRACTURE SURGERY Left 12/20/2018    ORIF wrist    HYSTERECTOMY      JOINT REPLACEMENT      right knee    JOINT REPLACEMENT      WRIST FRACTURE SURGERY Left 12/20/2018    WRIST OPEN REDUCTION INTERNAL FIXATION-DISTAL RADIUS performed by Junior Aguirre MD at [address]       Medications:      ipratropium-albuterol  1 ampule Inhalation Q6H    famotidine (PEPCID) injection  20 mg Intravenous BID    anesthetic and narcotic custom mixture   Intrathecal Once    sodium chloride  500 mL Intravenous Once    hydrocortisone sodium succinate PF  100 mg Intravenous Q8H    azithromycin  500 mg Intravenous Q24H    cefTRIAXone (ROCEPHIN) IV  1 g Intravenous Q24H    aspirin  81 mg Oral Daily    levothyroxine  150 mcg Oral Daily  pregabalin  300 mg Oral BID    sodium chloride flush  10 mL Intravenous 2 times per day       Social History:     Social History     Socioeconomic History    Marital status:      Spouse name: Not on file    Number of children: Not on file    Years of education: Not on file    Highest education level: Not on file   Occupational History    Not on file   Social Needs    Financial resource strain: Not on file    Food insecurity     Worry: Not on file     Inability: Not on file    Transportation needs     Medical: Not on file     Non-medical: Not on file   Tobacco Use    Smoking status: Former Smoker     Last attempt to quit: 1976     Years since quittin.9    Smokeless tobacco: Never Used   Substance and Sexual Activity    Alcohol use: No    Drug use: No    Sexual activity: Not on file   Lifestyle    Physical activity     Days per week: Not on file     Minutes per session: Not on file    Stress: Not on file   Relationships    Social connections     Talks on phone: Not on file     Gets together: Not on file     Attends [employer] service: Not on file     Active member of club or organization: Not on file     Attends meetings of clubs or organizations: Not on file     Relationship status: Not on file    Intimate partner violence     Fear of current or ex partner: Not on file     Emotionally abused: Not on file     Physically abused: Not on file     Forced sexual activity: Not on file   Other Topics Concern    Not on file   Social History Narrative    Not on file       Family History:   No family history on file. Allergies:   Patient has no known allergies.      Review of Systems:   Review of Systems   Unable to perform ROS: Intubated          Physical Examination :     Patient Vitals for the past 8 hrs:   BP Temp Temp src Pulse Resp SpO2   10/24/20 1520 -- -- -- -- 26 100 %   10/24/20 1518 -- -- -- 75 26 100 %   10/24/20 1500 (!) 96/50 -- -- 77 26 100 %   10/24/20 1400 108/60 -- -- 76 26 100 %   10/24/20 1300 117/63 -- -- 77 26 100 %   10/24/20 1200 114/63 98.1 °F (36.7 °C) Oral 78 25 100 %   10/24/20 1125 -- -- -- 80 26 100 %   10/24/20 1100 111/60 -- -- 79 26 100 %   10/24/20 1015 -- -- -- 81 26 100 %     Physical Exam  Constitutional:       General: She is not in acute distress. Appearance: Normal appearance. She is obese. She is ill-appearing. HENT:      Head: Normocephalic and atraumatic. Nose: Nose normal. No congestion. Mouth/Throat:      Mouth: Mucous membranes are moist.   Eyes:      General: No scleral icterus. Conjunctiva/sclera: Conjunctivae normal.   Neck:      Musculoskeletal: Neck supple. No neck rigidity. Cardiovascular:      Rate and Rhythm: Normal rate and regular rhythm. Heart sounds: Normal heart sounds. No murmur. Pulmonary:      Effort: No respiratory distress. Breath sounds: Rales present. Abdominal:      General: There is no distension. Palpations: Abdomen is soft. Tenderness: There is no abdominal tenderness. Genitourinary:     Comments: Urine maral  R fem line in good condition  Musculoskeletal:         General: No swelling or tenderness. Skin:     Coloration: Skin is not jaundiced or pale.    Neurological:      Comments: Sedated    Psychiatric:      Comments: Calm on the vent sedated          Medical Decision Making -Laboratory:   I have independently reviewed/ordered the following labs:    CBC with Differential:   Recent Labs     10/23/20  0602 10/24/20  0358   WBC 9.4 16.8*   HGB 12.0 12.1   HCT 38.9 39.5    249   LYMPHOPCT 12* 5*   MONOPCT 5 3     BMP:   Recent Labs     10/23/20  0602 10/23/20  1540 10/24/20  0358     --  137   K 3.9 3.9 4.3   *  --  103   CO2 23  --  25   BUN 17  --  14   CREATININE 0.56  --  0.53   MG  --  2.0 2.0     Hepatic Function Panel:   Recent Labs     10/23/20  0602 10/24/20  0358   PROT 5.2* 5.3*   LABALBU 3.2* 2.7*   BILITOT 0.31 0.36   ALKPHOS 38 55   ALT 13 11 AST 38* 23     No results for input(s): RPR in the last 72 hours. No results for input(s): HIV in the last 72 hours. No results for input(s): BC in the last 72 hours.   Lab Results   Component Value Date    MUCUS 2+ 08/28/2019    RBC 3.93 10/24/2020    TRICHOMONAS NOT REPORTED 08/28/2019    WBC 16.8 10/24/2020    YEAST NOT REPORTED 08/28/2019    TURBIDITY CLEAR 10/22/2020     Lab Results   Component Value Date    CREATININE 0.53 10/24/2020    GLUCOSE 157 10/24/2020       Medical Decision Making-Imaging:   10/23/20:            Miriam York MD

## 2020-10-24 NOTE — PROGRESS NOTES
Critical Care Team - Daily Progress Note      Date and time: 10/24/2020 7:46 AM  Patient's name:  Vikram Pena  Medical Record Number: [id number]  Patient's account/billing number: [de-identified]  Patient's YOB: 1945  Age: 77 y.o. Date of Admission: 10/22/2020  6:58 PM  Length of stay during current admission: 2      Primary Care Physician: Maxi Ma MD  ICU Attending Physician: Dr. Kristyn Bullard    Code Status: Full Code    Reason for ICU admission: Shock      SUBJECTIVE:     OVERNIGHT EVENTS:       Patient was in A. fib with RVR,  received amiodarone bolus, started on amiodarone drip, received 1 dose of digoxin 250 MCG. This morning in normal sinus rhythm, heart rate in the 80s. Echo done yesterday showed EF 30%, apex akinetic. Plan for cardiac cath on Monday if patient is stable. Afebrile, SBP in the 120s, on  vasopressin and Frank-Synephrine 65 mcg  On versed for sedation, paralyzed with Nimbex     I&O- 3.7L urine output, +3.4 L since admission  On Rocephin and azithromycin for pneumonia  Lactic acid 3.1 this morning  Leukocytosis 16.8, likely steroid-induced. Hemoglobin 12.1  CXR 10/24/2020-increased bilateral opacities.  Will give lasix 20 mg  IV once    ABG/Ventilator settings   PH 7.313,PCO2 54.9,PO2 84.7,HCO3 29.5  FIO2 50, RR 24 ,PEEP 8,[id number]          AWAKE & FOLLOWING COMMANDS:  [x] No   [] Yes    CURRENT VENTILATION STATUS:     [x] Ventilator  [] BIPAP  [] Nasal Cannula [] Room Air        SECRETIONS Amount:  [] Small [] Moderate  [] Large  [x] None  Color:     [] White [] Colored  [] Bloody    SEDATION:  RAAS Score:  [] Propofol gtt  [x] Versed gtt  [] Ativan gtt   [] No Sedation    PARALYZED:  [] No    [x] Yes    DIARRHEA:                [x] No                [] Yes  (C. Difficile status: [] positive                                                                                                                       [] negative [] pending)    VASOPRESSORS:  [] No    [x] Yes    If yes -   [] Levophed       [] Dopamine     [x] Vasopressin       [] Dobutamine  [x] Phenylephrine         [] Epinephrine    CENTRAL LINES:     [] No   [x] Yes   (Date of Insertion:10/23/2020   )           If yes -     [] Right IJ     [] Left IJ [x] Right Femoral [] Left Femoral                   [] Right Subclavian [] Left Subclavian       LUNA'S CATHETER:   [] No   [x] Yes  (Date of Insertion:10/22/2020   )     URINE OUTPUT:            [x] Good   [] Low              [] Anuric      OBJECTIVE:     VITAL SIGNS:  /65   Pulse 81   Temp 98.7 °F (37.1 °C) (Oral)   Resp 24   Ht 5' 4\" (1.626 m)   Wt 162 lb 0.6 oz (73.5 kg)   SpO2 100%   BMI 27.81 kg/m²   Tmax over 24 hours:  Temp (24hrs), Av.8 °F (37.7 °C), Min:98.7 °F (37.1 °C), Max:101 °F (38.3 °C)      Patient Vitals for the past 6 hrs:   BP Temp Temp src Pulse Resp SpO2 Weight   10/24/20 0630 -- -- -- 81 24 100 % --   10/24/20 0615 -- -- -- 82 24 100 % --   10/24/20 0610 -- -- -- -- -- -- 162 lb 0.6 oz (73.5 kg)   10/24/20 0600 125/65 -- -- 82 24 100 % --   10/24/20 0545 -- -- -- 81 24 100 % --   10/24/20 0530 -- -- -- 86 24 99 % --   10/24/20 0515 -- -- -- 91 24 -- --   10/24/20 0500 -- -- -- 93 20 -- --   10/24/20 0445 -- -- -- 94 20 98 % --   10/24/20 0430 -- -- -- 94 20 -- --   10/24/20 0415 -- -- -- 95 20 -- --   10/24/20 0400 -- 98.7 °F (37.1 °C) Oral 93 20 -- --   10/24/20 0351 -- -- -- 92 20 -- --   10/24/20 0350 -- -- -- -- 20 -- --   10/24/20 0345 -- -- -- 104 20 -- --   10/24/20 0330 -- -- -- 104 20 -- --   10/24/20 0315 -- -- -- 105 20 -- --   10/24/20 0300 -- -- -- 103 20 -- --   10/24/20 0245 -- -- -- 107 20 -- --   10/24/20 0230 -- -- -- 108 20 -- --   10/24/20 0215 -- -- -- 109 20 -- --   10/24/20 0200 -- -- -- 107 20 -- --         Intake/Output Summary (Last 24 hours) at 10/24/2020 0746  Last data filed at 10/24/2020 5803  Gross per 24 hour   Intake 6401.58 ml   Output 3675 ml   Net 2726.58 ml     Wt Readings from Last 2 Encounters:   10/24/20 162 lb 0.6 oz (73.5 kg)   10/22/20 164 lb (74.4 kg)     Body mass index is 27.81 kg/m².         PHYSICAL EXAMINATION:    General appearance - intubated, sedated and paralyzed  Nose - normal and patent,   Mouth - mucous membranes moist  Neck - supple, no significant adenopathy  Chest - reduced breath sounds bilaterally  Heart - in normal sinus rhythm this morning  Abdomen - soft, nondistended,   Neurological - intubated, sedated, paralyzed  Extremities - peripheral pulses normal, no pedal edema,  Skin - normal coloration and turgor, no rashes, no suspicious skin lesions noted      Any additional physical findings:    MEDICATIONS:    Scheduled Meds:   ipratropium-albuterol  1 ampule Inhalation Q6H    famotidine (PEPCID) injection  20 mg Intravenous BID    anesthetic and narcotic custom mixture   Intrathecal Once    sodium chloride  500 mL Intravenous Once    hydrocortisone sodium succinate PF  100 mg Intravenous Q8H    azithromycin  500 mg Intravenous Q24H    cefTRIAXone (ROCEPHIN) IV  1 g Intravenous Q24H    aspirin  81 mg Oral Daily    levothyroxine  150 mcg Oral Daily    pregabalin  300 mg Oral BID    sodium chloride flush  10 mL Intravenous 2 times per day    [Held by provider] enoxaparin  40 mg Subcutaneous Daily     Continuous Infusions:   phenylephrine (TOMÁS-SYNEPHRINE) 50mg/250mL infusion 90 mcg/min (10/24/20 0655)    norepinephrine Stopped (10/24/20 0644)    heparin (PORCINE) Infusion 16 Units/kg/hr (10/24/20 0601)    fentaNYL 200 mcg/hr (10/24/20 0624)    cisatracurium (NIMBEX) infusion 3 mcg/kg/min (10/24/20 0741)    midazolam 8 mg/hr (10/24/20 0658)    vasopressin (Septic Shock) infusion 0.03 Units/min (10/24/20 0142)    amiodarone 0.5 mg/min (10/24/20 0604)    dextrose 5% and 0.45% NaCl with KCl 20 mEq 100 mL/hr at 10/24/20 0407    propofol Stopped (10/23/20 1230)     PRN Meds:   albuterol, 2.5 mg, As Directed RT PRN  heparin (porcine), 80 Units/kg, PRN  heparin (porcine), 40 Units/kg, PRN  sodium chloride flush, 10 mL, PRN  acetaminophen, 650 mg, Q4H PRN  ondansetron, 4 mg, Q8H PRN  [Held by provider] HYDROcodone 5 mg - acetaminophen, 2 tablet, Q8H PRN  potassium chloride, 10 mEq, PRN          VENT SETTINGS (Comprehensive) (if applicable):  Vent Information  $Ventilation: $Subsequent Day  Skin Assessment: Clean, dry, & intact  Equipment ID: [id number]  Equipment Changed: Expiratory Filter(insp filter)  Vent Type: Servo i  Vent Mode: PRVC  Vt Ordered: 480 mL  Rate Set: (S) 24 bmp(post ABG)  FiO2 : (S) 50 %  SpO2: 100 %  SpO2/FiO2 ratio: 176.36  Sensitivity: 3  PEEP/CPAP: 8  I Time/ I Time %: 0.95 s  Humidification Source: Heated wire  Humidification Temp Measured: 37  Circuit Condensation: Drained  Nitric Oxide/Epoprostenol In Use?: No  Mask Type: Full face mask  Mask Size: Small  Additional Respiratory  Assessments  Pulse: 81  Resp: 24  SpO2: 100 %  $End Tidal CO2: 38  pCO2 (TCOM, mmHg): 42 mmHg  Position: Semi-Mcclendon's  Humidification Source: Heated wire  Circuit Condensation: Drained  Oral Care Completed?: Yes  Oral Care: Mouth suctioned, Mouthwash, Lip moisturizer applied, Mouth swabbed, Mouth moisturizer  Subglottic Suction Done?: No  Cuff Pressure (cm H2O): (mov)    ABGs:     Laboratory findings:    Complete Blood Count:   Recent Labs     10/23/20  0400 10/23/20  0602 10/24/20  0358   WBC 11.9* 9.4 16.8*   HGB 12.7 12.0 12.1   HCT 39.7 38.9 39.5    227 249        Last 3 Blood Glucose:   Recent Labs     10/23/20  0400 10/23/20  0602 10/24/20  0358   GLUCOSE 110* 104* 157*        PT/INR:    Lab Results   Component Value Date    PROTIME 12.7 10/22/2020    INR 1.0 10/22/2020     PTT:    Lab Results   Component Value Date    APTT 51.3 10/24/2020       Comprehensive Metabolic Profile:   Recent Labs     10/23/20  0400 10/23/20  0602 10/23/20  1540 10/24/20  0358  143  --  137   K 4.2 3.9 3.9 4.3    108*  --  103   CO2 24 23  --  25   BUN 18 17  --  14   CREATININE 0.59 0.56  --  0.53   GLUCOSE 110* 104*  --  157*   CALCIUM 8.4* 8.2*  --  8.1*   PROT 5.4* 5.2*  --  5.3*   LABALBU 3.3* 3.2*  --  2.7*   BILITOT 0.39 0.31  --  0.36   ALKPHOS 40 38  --  55   AST 38* 38*  --  23   ALT 13 13  --  11      Magnesium:   Lab Results   Component Value Date    MG 2.0 10/24/2020     Phosphorus: No results found for: PHOS  Ionized Calcium:   Lab Results   Component Value Date    CAION 1.17 10/24/2020        Urinalysis:     Troponin: No results for input(s): TROPONINI in the last 72 hours. Microbiology:    Cultures during this admission:     Blood cultures:                 [] None drawn      [] Negative             []  Positive (Details:  )  Urine Culture:                   [] None drawn      [] Negative             []  Positive (Details:  )  Sputum Culture:               [] None drawn       [x] Negative             []  Positive (Details:  )   Endotracheal aspirate:     [] None drawn       [x] Negative             []  Positive (Details:  )     Other pertinent Labs:       Radiology/Imaging:     Chest Xray (10/24/2020):    ASSESSMENT:     · Active Problems:  ·   Severe sepsis ([facility] [address]  ·   [facility]  ·   Acute respiratory failure with hypoxia and hypercapnia (H[facility])  ·   Elevated troponin  · Resolved Problems:  ·   * No resolved hospital problems. *  ·   ·         PLAN:     WEAN PER PROTOCOL:  [] No   [x] Yes  [] N/A    DISCONTINUE ANY LABS:   [x] No   [] Yes    ICU PROPHYLAXIS:  Stress ulcer:  [] PPI Agent  [x] [id number] [] Sucralfate  [] Other:  VTE:   [] Enoxaparin  [] Unfract.  Heparin Subcut  [] EPC Cuffs    NUTRITION:  [] NPO [] Tube Feeding (Specify: ) [] TPN  [x] PO (Diet: Diet NPO Effective Now)    HOME MEDICATIONS RECONCILED: [] No  [] Yes    INSULIN DRIP:   [x] No   [] Yes    CONSULTATION NEEDED:  [] No   [x] Yes    FAMILY UPDATED:    [] No   [] Yes    TRANSFER OUT OF ICU:   [x] No   [] Yes    ADDITIONAL PLAN:    Shock- septic vs cardiogenic  Pneumonia- ceftriaxone and rocephin  Intubated , sedated and paralyzed  On vasopressin, phenylephrine,  Solucortef 100 Q 8hrly  Echo -EF 30%, apex akinetic. Plan for cardiac cath on Monday if patient is stable. A - fib with RVR  On amiodarone  drip, heparin gtt  currently in NSR, rate controlled    H/o hypothyroidism  On synthorid 150mcg  TSH 10/24/2020- 0.69    Lactic acidosis- improving  Trend lactic acid    Gi prophylaxis- pepcid  DVT prophylaxis- heparin        Keisha Ibanez M.D.              Critical care resident,  Department of Internal Medicine/ Critical care  [facility] ([address])             10/24/2020, 7:46 AM

## 2020-10-24 NOTE — PROGRESS NOTES
[facility]   Progress Note                   Date:   10/24/2020  Patient name: Anni Holt  Date of admission:  10/22/2020  6:58 PM  MRN:   [id number]  YOB: 1945  PCP: Maia Da Silva MD    Reason for Admission:  Possible Sepsis. Subjective:       Clinical Changes / Abnormalities: Intubated and sedated. Had Afib with RVR overnight, back to NSR.        Medications:   Scheduled Meds:   ipratropium-albuterol  1 ampule Inhalation Q6H    famotidine (PEPCID) injection  20 mg Intravenous BID    anesthetic and narcotic custom mixture   Intrathecal Once    sodium chloride  500 mL Intravenous Once    hydrocortisone sodium succinate PF  100 mg Intravenous Q8H    azithromycin  500 mg Intravenous Q24H    cefTRIAXone (ROCEPHIN) IV  1 g Intravenous Q24H    aspirin  81 mg Oral Daily    levothyroxine  150 mcg Oral Daily    pregabalin  300 mg Oral BID    sodium chloride flush  10 mL Intravenous 2 times per day    [Held by provider] enoxaparin  40 mg Subcutaneous Daily     Continuous Infusions:   phenylephrine (TOMÁS-SYNEPHRINE) 50mg/250mL infusion 90 mcg/min (10/24/20 0655)    norepinephrine Stopped (10/24/20 0644)    heparin (PORCINE) Infusion 16 Units/kg/hr (10/24/20 0601)    fentaNYL 200 mcg/hr (10/24/20 0624)    cisatracurium (NIMBEX) infusion 3 mcg/kg/min (10/24/20 0741)    midazolam 7 mg/hr (10/24/20 0749)    vasopressin (Septic Shock) infusion 0.03 Units/min (10/24/20 0142)    amiodarone 0.5 mg/min (10/24/20 0604)    dextrose 5% and 0.45% NaCl with KCl 20 mEq 100 mL/hr at 10/24/20 0407    propofol Stopped (10/23/20 1230)     CBC:   Recent Labs     10/23/20  0400 10/23/20  0602 10/24/20  0358   WBC 11.9* 9.4 16.8*   HGB 12.7 12.0 12.1    227 249     BMP:    Recent Labs     10/23/20  0400 10/23/20  0602 10/23/20  1540 10/24/20  0358    143  --  137   K 4.2 3.9 3.9 4.3    108*  --  103   CO2 24 23  --  25   BUN 18 17  --  14   CREATININE 0.59 0.56  -- 0. 53   GLUCOSE 110* 104*  --  157*     Hepatic:   Recent Labs     10/23/20  0400 10/23/20  0602 10/24/20  0358   AST 38* 38* 23   ALT 13 13 11   BILITOT 0.39 0.31 0.36   ALKPHOS 40 38 55     Troponin: No results for input(s): TROPONINI in the last 72 hours. BNP: No results for input(s): BNP in the last 72 hours. Lipids: No results for input(s): CHOL, HDL in the last 72 hours. Invalid input(s): LDLCALCU  INR:   Recent Labs     10/22/20  0825   INR 1.0       Objective:   Vitals: /65   Pulse 80   Temp 98.6 °F (37 °C)   Resp 24   Ht 5' 4\" (1.626 m)   Wt 162 lb 0.6 oz (73.5 kg)   SpO2 100%   BMI 27.81 kg/m²   General appearance: Intubated and sedated. HEENT: Head: Normocephalic, no lesions, without obvious abnormality. Neck: no adenopathy, no carotid bruit, no JVD, supple, symmetrical, trachea midline and thyroid not enlarged, symmetric, no tenderness/mass/nodules  Lungs: clear to auscultation bilaterally  Heart: regular rate and rhythm, S1, S2 normal, no murmur, click, rub or gallop  Abdomen: soft, non-tender; bowel sounds normal; no masses,  no organomegaly  Extremities: extremities normal, atraumatic, no cyanosis or edema    Echo 10/2020  Summary  Left ventricle is normal in size. Global left ventricular systolic function  is severely reduced. Estimated ejection fraction is 25-30 % . Calculated EF via heart model is 30 %. The apex appears akinetic. Cannot exclude an apical thrombus, consider  Definity. Mild mitral regurgitation. Mild to moderate tricuspid regurgitation. Estimated right ventricular  systolic pressure is 35 mmHg. IVC dilated but unable to assess respiratory collapse due to patient on  ventilator.           Assessment / Acute Cardiac Problems/Plan   1. Severe Cardiomyopathy/NSTEMI with picture of Cardiogenic shock. On Pressors. Wean as tolerated. Continue IV heparin. Plan for cath on Monday if cleared by primary service. 2. Possible LV clot. Continue IV heparin.    3. Atrial fibrillation with RVR. Back to NSR. Continue IV Amiodarone and heparin. 4. Pneumonia/Sepsis. Treat per primary service. 5.   Respiratory failure. On MV.          Wilfredo Knight MD  [facility]  [phone]

## 2020-10-25 ENCOUNTER — APPOINTMENT (OUTPATIENT)
Dept: GENERAL RADIOLOGY | Age: 75
DRG: 870 | End: 2020-10-25
Attending: INTERNAL MEDICINE
Payer: MEDICARE

## 2020-10-25 LAB
ABSOLUTE EOS #: 0 K/UL (ref 0–0.4)
ABSOLUTE IMMATURE GRANULOCYTE: 0 K/UL (ref 0–0.3)
ABSOLUTE LYMPH #: 0.41 K/UL (ref 1–4.8)
ABSOLUTE MONO #: 0.27 K/UL (ref 0.1–0.8)
ALBUMIN SERPL-MCNC: 2.9 G/DL (ref 3.5–5.2)
ALBUMIN/GLOBULIN RATIO: 1.1 (ref 1–2.5)
ALLEN TEST: ABNORMAL
ALP BLD-CCNC: 66 U/L (ref 35–104)
ALT SERPL-CCNC: 9 U/L (ref 5–33)
ANION GAP SERPL CALCULATED.3IONS-SCNC: 7 MMOL/L (ref 9–17)
AST SERPL-CCNC: 13 U/L
BASOPHILS # BLD: 0 % (ref 0–2)
BASOPHILS ABSOLUTE: 0 K/UL (ref 0–0.2)
BILIRUB SERPL-MCNC: 0.38 MG/DL (ref 0.3–1.2)
BNP INTERPRETATION: ABNORMAL
BUN BLDV-MCNC: 16 MG/DL (ref 8–23)
BUN/CREAT BLD: ABNORMAL (ref 9–20)
CALCIUM SERPL-MCNC: 8.6 MG/DL (ref 8.6–10.4)
CHLORIDE BLD-SCNC: 99 MMOL/L (ref 98–107)
CO2: 25 MMOL/L (ref 20–31)
CREAT SERPL-MCNC: 0.4 MG/DL (ref 0.5–0.9)
DIFFERENTIAL TYPE: ABNORMAL
EOSINOPHILS RELATIVE PERCENT: 0 % (ref 1–4)
FIO2: 45
GFR AFRICAN AMERICAN: >60 ML/MIN
GFR NON-AFRICAN AMERICAN: >60 ML/MIN
GFR SERPL CREATININE-BSD FRML MDRD: ABNORMAL ML/MIN/{1.73_M2}
GFR SERPL CREATININE-BSD FRML MDRD: ABNORMAL ML/MIN/{1.73_M2}
GLUCOSE BLD-MCNC: 135 MG/DL (ref 70–99)
GLUCOSE BLD-MCNC: 163 MG/DL (ref 74–100)
HCT VFR BLD CALC: 33.3 % (ref 36.3–47.1)
HEMOGLOBIN: 10.5 G/DL (ref 11.9–15.1)
IMMATURE GRANULOCYTES: 0 %
LACTIC ACID, WHOLE BLOOD: 1.8 MMOL/L (ref 0.7–2.1)
LACTIC ACID, WHOLE BLOOD: 2 MMOL/L (ref 0.7–2.1)
LACTIC ACID, WHOLE BLOOD: 2.1 MMOL/L (ref 0.7–2.1)
LACTIC ACID, WHOLE BLOOD: 2.9 MMOL/L (ref 0.7–2.1)
LACTIC ACID, WHOLE BLOOD: 3 MMOL/L (ref 0.7–2.1)
LYMPHOCYTES # BLD: 3 % (ref 24–44)
MCH RBC QN AUTO: 30.7 PG (ref 25.2–33.5)
MCHC RBC AUTO-ENTMCNC: 31.5 G/DL (ref 28.4–34.8)
MCV RBC AUTO: 97.4 FL (ref 82.6–102.9)
MODE: ABNORMAL
MONOCYTES # BLD: 2 % (ref 1–7)
MORPHOLOGY: NORMAL
NEGATIVE BASE EXCESS, ART: ABNORMAL (ref 0–2)
NRBC AUTOMATED: 0 PER 100 WBC
O2 DEVICE/FLOW/%: ABNORMAL
PARTIAL THROMBOPLASTIN TIME: 38.7 SEC (ref 20.5–30.5)
PARTIAL THROMBOPLASTIN TIME: 41.8 SEC (ref 20.5–30.5)
PARTIAL THROMBOPLASTIN TIME: 47 SEC (ref 20.5–30.5)
PARTIAL THROMBOPLASTIN TIME: 55.1 SEC (ref 20.5–30.5)
PATIENT TEMP: ABNORMAL
PDW BLD-RTO: 13.9 % (ref 11.8–14.4)
PLATELET # BLD: 175 K/UL (ref 138–453)
PLATELET ESTIMATE: ABNORMAL
PMV BLD AUTO: 10.5 FL (ref 8.1–13.5)
POC HCO3: 27.6 MMOL/L (ref 21–28)
POC O2 SATURATION: 98 % (ref 94–98)
POC PCO2 TEMP: ABNORMAL MM HG
POC PCO2: 43 MM HG (ref 35–48)
POC PH TEMP: ABNORMAL
POC PH: 7.41 (ref 7.35–7.45)
POC PO2 TEMP: ABNORMAL MM HG
POC PO2: 108.8 MM HG (ref 83–108)
POSITIVE BASE EXCESS, ART: 3 (ref 0–3)
POTASSIUM SERPL-SCNC: 3.2 MMOL/L (ref 3.7–5.3)
PRO-BNP: 4387 PG/ML
RBC # BLD: 3.42 M/UL (ref 3.95–5.11)
RBC # BLD: ABNORMAL 10*6/UL
SAMPLE SITE: ABNORMAL
SEG NEUTROPHILS: 95 % (ref 36–66)
SEGMENTED NEUTROPHILS ABSOLUTE COUNT: 13.02 K/UL (ref 1.8–7.7)
SODIUM BLD-SCNC: 131 MMOL/L (ref 135–144)
TCO2 (CALC), ART: 29 MMOL/L (ref 22–29)
TOTAL PROTEIN: 5.5 G/DL (ref 6.4–8.3)
WBC # BLD: 13.7 K/UL (ref 3.5–11.3)
WBC # BLD: ABNORMAL 10*3/UL

## 2020-10-25 PROCEDURE — 80053 COMPREHEN METABOLIC PANEL: CPT

## 2020-10-25 PROCEDURE — 71045 X-RAY EXAM CHEST 1 VIEW: CPT

## 2020-10-25 PROCEDURE — 6360000002 HC RX W HCPCS: Performed by: STUDENT IN AN ORGANIZED HEALTH CARE EDUCATION/TRAINING PROGRAM

## 2020-10-25 PROCEDURE — 2580000003 HC RX 258: Performed by: STUDENT IN AN ORGANIZED HEALTH CARE EDUCATION/TRAINING PROGRAM

## 2020-10-25 PROCEDURE — 2000000000 HC ICU R&B

## 2020-10-25 PROCEDURE — 94003 VENT MGMT INPAT SUBQ DAY: CPT

## 2020-10-25 PROCEDURE — 83880 ASSAY OF NATRIURETIC PEPTIDE: CPT

## 2020-10-25 PROCEDURE — 82947 ASSAY GLUCOSE BLOOD QUANT: CPT

## 2020-10-25 PROCEDURE — 83605 ASSAY OF LACTIC ACID: CPT

## 2020-10-25 PROCEDURE — 99232 SBSQ HOSP IP/OBS MODERATE 35: CPT | Performed by: INTERNAL MEDICINE

## 2020-10-25 PROCEDURE — 85730 THROMBOPLASTIN TIME PARTIAL: CPT

## 2020-10-25 PROCEDURE — 6370000000 HC RX 637 (ALT 250 FOR IP): Performed by: STUDENT IN AN ORGANIZED HEALTH CARE EDUCATION/TRAINING PROGRAM

## 2020-10-25 PROCEDURE — 94761 N-INVAS EAR/PLS OXIMETRY MLT: CPT

## 2020-10-25 PROCEDURE — 82803 BLOOD GASES ANY COMBINATION: CPT

## 2020-10-25 PROCEDURE — 85025 COMPLETE CBC W/AUTO DIFF WBC: CPT

## 2020-10-25 PROCEDURE — 37799 UNLISTED PX VASCULAR SURGERY: CPT

## 2020-10-25 PROCEDURE — 2500000003 HC RX 250 WO HCPCS: Performed by: STUDENT IN AN ORGANIZED HEALTH CARE EDUCATION/TRAINING PROGRAM

## 2020-10-25 PROCEDURE — 2700000000 HC OXYGEN THERAPY PER DAY

## 2020-10-25 PROCEDURE — 99291 CRITICAL CARE FIRST HOUR: CPT | Performed by: INTERNAL MEDICINE

## 2020-10-25 PROCEDURE — 94640 AIRWAY INHALATION TREATMENT: CPT

## 2020-10-25 PROCEDURE — 94770 HC ETCO2 MONITOR DAILY: CPT

## 2020-10-25 RX ORDER — DEXTROSE, SODIUM CHLORIDE, AND POTASSIUM CHLORIDE 5; .9; .15 G/100ML; G/100ML; G/100ML
INJECTION INTRAVENOUS CONTINUOUS
Status: DISCONTINUED | OUTPATIENT
Start: 2020-10-25 | End: 2020-10-28

## 2020-10-25 RX ORDER — PROPOFOL 10 MG/ML
10 INJECTION, EMULSION INTRAVENOUS
Status: DISCONTINUED | OUTPATIENT
Start: 2020-10-25 | End: 2020-10-28

## 2020-10-25 RX ORDER — POTASSIUM CHLORIDE 29.8 MG/ML
20 INJECTION INTRAVENOUS PRN
Status: DISCONTINUED | OUTPATIENT
Start: 2020-10-25 | End: 2020-11-02

## 2020-10-25 RX ORDER — FUROSEMIDE 10 MG/ML
40 INJECTION INTRAMUSCULAR; INTRAVENOUS ONCE
Status: COMPLETED | OUTPATIENT
Start: 2020-10-25 | End: 2020-10-25

## 2020-10-25 RX ADMIN — Medication 4 MG/HR: at 04:00

## 2020-10-25 RX ADMIN — FAMOTIDINE 20 MG: 10 INJECTION INTRAVENOUS at 08:04

## 2020-10-25 RX ADMIN — Medication 125 MCG/HR: at 01:11

## 2020-10-25 RX ADMIN — POTASSIUM CHLORIDE 20 MEQ: 29.8 INJECTION, SOLUTION INTRAVENOUS at 07:49

## 2020-10-25 RX ADMIN — HYDROCORTISONE SODIUM SUCCINATE 50 MG: 100 INJECTION, POWDER, FOR SOLUTION INTRAMUSCULAR; INTRAVENOUS at 23:12

## 2020-10-25 RX ADMIN — CEFTRIAXONE SODIUM 1 G: 1 INJECTION, POWDER, FOR SOLUTION INTRAMUSCULAR; INTRAVENOUS at 08:03

## 2020-10-25 RX ADMIN — PREGABALIN 300 MG: 100 CAPSULE ORAL at 08:41

## 2020-10-25 RX ADMIN — PREGABALIN 300 MG: 100 CAPSULE ORAL at 22:12

## 2020-10-25 RX ADMIN — IPRATROPIUM BROMIDE AND ALBUTEROL SULFATE 1 AMPULE: .5; 3 SOLUTION RESPIRATORY (INHALATION) at 19:40

## 2020-10-25 RX ADMIN — AMIODARONE HYDROCHLORIDE 0.5 MG/MIN: 50 INJECTION, SOLUTION INTRAVENOUS at 14:24

## 2020-10-25 RX ADMIN — HEPARIN SODIUM 22 UNITS/KG/HR: 10000 INJECTION, SOLUTION INTRAVENOUS at 15:33

## 2020-10-25 RX ADMIN — Medication 81 MG: at 08:04

## 2020-10-25 RX ADMIN — HEPARIN SODIUM 2860 UNITS: 1000 INJECTION INTRAVENOUS; SUBCUTANEOUS at 04:22

## 2020-10-25 RX ADMIN — HYDROCORTISONE SODIUM SUCCINATE 100 MG: 100 INJECTION, POWDER, FOR SOLUTION INTRAMUSCULAR; INTRAVENOUS at 00:15

## 2020-10-25 RX ADMIN — Medication 100 MCG/HR: at 20:32

## 2020-10-25 RX ADMIN — AZITHROMYCIN MONOHYDRATE 500 MG: 500 INJECTION, POWDER, LYOPHILIZED, FOR SOLUTION INTRAVENOUS at 08:42

## 2020-10-25 RX ADMIN — FUROSEMIDE 40 MG: 10 INJECTION, SOLUTION INTRAMUSCULAR; INTRAVENOUS at 10:14

## 2020-10-25 RX ADMIN — PROPOFOL 10 MCG/KG/MIN: 10 INJECTION, EMULSION INTRAVENOUS at 20:38

## 2020-10-25 RX ADMIN — Medication 100 MCG/HR: at 09:11

## 2020-10-25 RX ADMIN — IPRATROPIUM BROMIDE AND ALBUTEROL SULFATE 1 AMPULE: .5; 3 SOLUTION RESPIRATORY (INHALATION) at 13:42

## 2020-10-25 RX ADMIN — POTASSIUM CHLORIDE 20 MEQ: 29.8 INJECTION, SOLUTION INTRAVENOUS at 06:38

## 2020-10-25 RX ADMIN — HEPARIN SODIUM 20 UNITS/KG/HR: 10000 INJECTION, SOLUTION INTRAVENOUS at 01:11

## 2020-10-25 RX ADMIN — HYDROCORTISONE SODIUM SUCCINATE 100 MG: 100 INJECTION, POWDER, FOR SOLUTION INTRAMUSCULAR; INTRAVENOUS at 08:04

## 2020-10-25 RX ADMIN — IPRATROPIUM BROMIDE AND ALBUTEROL SULFATE 1 AMPULE: .5; 3 SOLUTION RESPIRATORY (INHALATION) at 03:11

## 2020-10-25 RX ADMIN — POTASSIUM CHLORIDE, DEXTROSE MONOHYDRATE AND SODIUM CHLORIDE: 150; 5; 900 INJECTION, SOLUTION INTRAVENOUS at 08:19

## 2020-10-25 RX ADMIN — IPRATROPIUM BROMIDE AND ALBUTEROL SULFATE 1 AMPULE: .5; 3 SOLUTION RESPIRATORY (INHALATION) at 08:20

## 2020-10-25 RX ADMIN — HYDROCORTISONE SODIUM SUCCINATE 50 MG: 100 INJECTION, POWDER, FOR SOLUTION INTRAMUSCULAR; INTRAVENOUS at 16:45

## 2020-10-25 RX ADMIN — HEPARIN SODIUM 2860 UNITS: 1000 INJECTION INTRAVENOUS; SUBCUTANEOUS at 23:12

## 2020-10-25 RX ADMIN — FAMOTIDINE 20 MG: 10 INJECTION INTRAVENOUS at 20:29

## 2020-10-25 RX ADMIN — SODIUM CHLORIDE, PRESERVATIVE FREE 10 ML: 5 INJECTION INTRAVENOUS at 20:29

## 2020-10-25 RX ADMIN — CISATRACURIUM BESYLATE 4 MCG/KG/MIN: 200 INJECTION INTRAVENOUS at 06:43

## 2020-10-25 RX ADMIN — LEVOTHYROXINE SODIUM 150 MCG: 75 TABLET ORAL at 08:03

## 2020-10-25 RX ADMIN — HEPARIN SODIUM 2860 UNITS: 1000 INJECTION INTRAVENOUS; SUBCUTANEOUS at 16:55

## 2020-10-25 ASSESSMENT — PULMONARY FUNCTION TESTS
PIF_VALUE: 24
PIF_VALUE: 20
PIF_VALUE: 27
PIF_VALUE: 27
PIF_VALUE: 15
PIF_VALUE: 24
PIF_VALUE: 22
PIF_VALUE: 26
PIF_VALUE: 27

## 2020-10-25 ASSESSMENT — PAIN SCALES - GENERAL
PAINLEVEL_OUTOF10: 0
PAINLEVEL_OUTOF10: 5
PAINLEVEL_OUTOF10: 0

## 2020-10-25 NOTE — PLAN OF CARE
Problem: OXYGENATION/RESPIRATORY FUNCTION  Goal: Patient will maintain patent airway  10/25/2020 1030 by Zaid Oliver RCP  Outcome: Ongoing     Problem: OXYGENATION/RESPIRATORY FUNCTION  Goal: Patient will achieve/maintain normal respiratory rate/effort  Description: Respiratory rate and effort will be within normal limits for the patient  10/25/2020 1030 by Zaid Oliver RCP  Outcome: Ongoing     Problem: MECHANICAL VENTILATION  Goal: Oral health is maintained or improved  10/25/2020 1030 by Zaid Oliver RCP  Outcome: Ongoing     Problem: MECHANICAL VENTILATION  Goal: ET tube will be managed safely  10/25/2020 1030 by Zaid Oliver RCP  Outcome: Ongoing

## 2020-10-25 NOTE — PLAN OF CARE
Problem: Skin Integrity:  Goal: Will show no infection signs and symptoms  Description: Will show no infection signs and symptoms  10/25/2020 0905 by Mata José RN  Outcome: Ongoing     Problem: Falls - Risk of:  Goal: Will remain free from falls  Description: Will remain free from falls  10/25/2020 0905 by Mata José RN  Outcome: Ongoing     Problem: Pain:  Goal: Pain level will decrease  Description: Pain level will decrease  10/25/2020 0905 by Mata José RN  Outcome: Ongoing     Problem: Nutrition  Goal: Optimal nutrition therapy  Description: Nutrition Problem #1: Inadequate oral intake  Intervention: Food and/or Nutrient Delivery: Start nutrition as able; if TF, suggest Standard without Fiber goal 65 mL/hr to provide 1872 kcal and 87 g pro/day.   Nutritional Goals: meet % of estimated nutrient needs     10/25/2020 0905 by Mata José RN  Outcome: Ongoing

## 2020-10-25 NOTE — PROGRESS NOTES
[facility]   Progress Note                   Date:   10/25/2020  Patient name: Keyur Pelayo  Date of admission:  10/22/2020  6:58 PM  MRN:   [id number]  YOB: 1945  PCP: Sharon Morales MD    Reason for Admission:  Possible Sepsis. Subjective:       Clinical Changes / Abnormalities: Intubated and sedated. In NSR.        Medications:   Scheduled Meds:   ipratropium-albuterol  1 ampule Inhalation Q6H    famotidine (PEPCID) injection  20 mg Intravenous BID    anesthetic and narcotic custom mixture   Intrathecal Once    sodium chloride  500 mL Intravenous Once    hydrocortisone sodium succinate PF  100 mg Intravenous Q8H    azithromycin  500 mg Intravenous Q24H    cefTRIAXone (ROCEPHIN) IV  1 g Intravenous Q24H    aspirin  81 mg Oral Daily    levothyroxine  150 mcg Oral Daily    pregabalin  300 mg Oral BID    sodium chloride flush  10 mL Intravenous 2 times per day     Continuous Infusions:   dextrose 5% and 0.9% NaCl with KCl 20 mEq 50 mL/hr at 10/25/20 0819    phenylephrine (TOMÁS-SYNEPHRINE) 50mg/250mL infusion Stopped (10/25/20 0400)    norepinephrine Stopped (10/24/20 0644)    heparin (PORCINE) Infusion 22 Units/kg/hr (10/25/20 0421)    fentaNYL 100 mcg/hr (10/25/20 0911)    cisatracurium (NIMBEX) infusion 4 mcg/kg/min (10/25/20 0844)    midazolam 2 mg/hr (10/25/20 0845)    vasopressin (Septic Shock) infusion Stopped (10/25/20 0400)    amiodarone 0.5 mg/min (10/24/20 2357)    propofol Stopped (10/23/20 1230)     CBC:   Recent Labs     10/23/20  0602 10/24/20  0358 10/25/20  0534   WBC 9.4 16.8* 13.7*   HGB 12.0 12.1 10.5*    249 175     BMP:    Recent Labs     10/23/20  0602 10/23/20  1540 10/24/20  0358 10/25/20  0534     --  137 131*   K 3.9 3.9 4.3 3.2*   *  --  103 99   CO2 23  --  25 25   BUN 17  --  14 16   CREATININE 0.56  --  0.53 0.40*   GLUCOSE 104*  --  157* 135*     Hepatic:   Recent Labs     10/23/20  0602 10/24/20  0357 10/25/20  0534   AST 38* 23 13   ALT 13 11 9   BILITOT 0.31 0.36 0.38   ALKPHOS 38 55 66     Troponin: No results for input(s): TROPONINI in the last 72 hours. BNP: No results for input(s): BNP in the last 72 hours. Lipids: No results for input(s): CHOL, HDL in the last 72 hours. Invalid input(s): LDLCALCU  INR:   No results for input(s): INR in the last 72 hours. Objective:   Vitals: BP (!) 101/51   Pulse 102   Temp 98 °F (36.7 °C) (Axillary)   Resp 22   Ht 5' 4\" (1.626 m)   Wt 164 lb 7.4 oz (74.6 kg)   SpO2 96%   BMI 28.23 kg/m²   General appearance: Intubated and sedated. HEENT: Head: Normocephalic, no lesions, without obvious abnormality. Neck: no adenopathy, no carotid bruit, no JVD, supple, symmetrical, trachea midline and thyroid not enlarged, symmetric, no tenderness/mass/nodules  Lungs: clear to auscultation bilaterally  Heart: regular rate and rhythm, S1, S2 normal, no murmur, click, rub or gallop  Abdomen: soft, non-tender; bowel sounds normal; no masses,  no organomegaly  Extremities: extremities normal, atraumatic, no cyanosis or edema    Echo 10/2020  Summary  Left ventricle is normal in size. Global left ventricular systolic function  is severely reduced. Estimated ejection fraction is 25-30 % . Calculated EF via heart model is 30 %. The apex appears akinetic. Cannot exclude an apical thrombus, consider  Definity. Mild mitral regurgitation. Mild to moderate tricuspid regurgitation. Estimated right ventricular  systolic pressure is 35 mmHg. IVC dilated but unable to assess respiratory collapse due to patient on  ventilator.           Assessment / Acute Cardiac Problems/Plan   1. Severe Cardiomyopathy/NSTEMI with picture of Cardiogenic shock. Off  Pressors. Continue IV heparin. Plan for cath on Monday if cleared by primary service. 2. Possible LV clot. Continue IV heparin. 3. Atrial fibrillation with RVR. Back to NSR. Continue IV Amiodarone and heparin. 4. Pneumonia/Sepsis. Treat per primary service. 5.   Respiratory failure. On MV.          Pily Orr MD  [facility]  [phone]

## 2020-10-25 NOTE — PROGRESS NOTES
PROCEDURE NOTE - CENTRAL VENOUS LINE PLACEMENT    PATIENT NAME: Janet [facility] RECORD NO. [id number]  DATE: 10/23/2020  ATTENDING PHYSICIAN: Dr. Cheo Garcia DIAGNOSIS:  centrally administered medications  POSTOPERATIVE DIAGNOSIS:  Same  PROCEDURE PERFORMED:  Right Femoral Vein Central Line Insertion  PERFORMING PHYSICIAN: Yaa Gardner DO  ANESTHESIA:  Local utilizing 1% lidocaine  ESTIMATED BLOOD LOSS:  Less than 25 ml  COMPLICATIONS:  None immediately appreciated. DISCUSSION:  Michael Sanchez is a 77y.o.-year-old female who requires central IV access centrally administered medications. The history and physical examination were reviewed and confirmed. CONSENT: The spouse was counseled regarding the procedure, its indications, risks, potential complications and alternatives, and any questions were answered. Consent was obtained to proceed. PROCEDURE:  A timeout was initiated by the bedside nurse and was confirmed by those present. The patient was placed in a supine position. The skin overlying the Right Femoral Vein was prepped with chlorhexadine and draped in sterile fashion. The skin was infiltrated with local anesthetic. The vessel and surrounding anatomy was visualized using ultrasound. Through the anesthetized region, the introducer needle was inserted into the right femoral vein returning dark red non pulsatile blood. A guidewire was placed through the center of the needle with no resistance. Ultrasound confirmed presence of wire in the vein. A small incision made in the skin with a #11 scalpel blade. The dilator was inserted into the skin and vein over guidewire using Seldinger technique. The dilator was then removed and the 7F 20cm catheter was placed in the vein over the guidewire using Seldinger technique. The guidewire was then removed and all ports aspirated and flushed appropriately.  The catheter then secured using silk suture and a temporary sterile dressing was applied. No immediate complication was evident. All sponge, instrument and needle counts were correct at the completion of the procedure.       Paola Moraes DO  02:23 PM, 10/23/20

## 2020-10-25 NOTE — PROGRESS NOTES
(!) 101/51   Pulse 100   Temp 98 °F (36.7 °C) (Axillary)   Resp 22   Ht 5' 4\" (1.626 m)   Wt 164 lb 7.4 oz (74.6 kg)   SpO2 96%   BMI 28.23 kg/m²     Tmax over 24 hours:  Temp (24hrs), Av.3 °F (36.8 °C), Min:98 °F (36.7 °C), Max:99 °F (37.2 °C)      Patient Vitals for the past 6 hrs:   BP Temp Temp src Pulse Resp SpO2 Weight   10/25/20 0945 -- -- -- 100 22 96 % --   10/25/20 0930 -- -- -- 101 22 95 % --   10/25/20 0915 -- -- -- 102 22 95 % --   10/25/20 0900 (!) 101/51 -- -- 102 22 96 % --   10/25/20 0845 -- -- -- 100 22 96 % --   10/25/20 0830 -- -- -- 93 22 96 % --   10/25/20 0820 -- -- -- 90 21 96 % --   10/25/20 0815 -- -- -- 91 22 96 % --   10/25/20 0800 (!) 104/54 98 °F (36.7 °C) Axillary 91 22 99 % --   10/25/20 0745 -- -- -- 93 22 95 % --   10/25/20 0730 -- -- -- 94 22 94 % --   10/25/20 0715 -- -- -- 96 22 95 % --   10/25/20 0700 (!) 103/53 -- -- 95 22 94 % --   10/25/20 0600 (!) 112/53 -- -- 102 22 93 % --   10/25/20 0500 (!) 109/50 -- -- 106 22 94 % --   10/25/20 0400 139/62 98.2 °F (36.8 °C) Oral 90 23 95 % 164 lb 7.4 oz (74.6 kg)         Intake/Output Summary (Last 24 hours) at 10/25/2020 0955  Last data filed at 10/25/2020 0800  Gross per 24 hour   Intake 3586.86 ml   Output 1785 ml   Net 1801.86 ml     Wt Readings from Last 2 Encounters:   10/25/20 164 lb 7.4 oz (74.6 kg)   10/22/20 164 lb (74.4 kg)     Body mass index is 28.23 kg/m².         PHYSICAL EXAMINATION:  General appearance -intubated, sedated, and paralyzed  Mental status -intubated and sedated  Eyes - PERRL  Chest - clear to auscultation, no wheezes, rales or rhonchi, symmetric air entry  Heart - normal rate and regular rhythm  Abdomen - soft, nontender, nondistended, no masses or organomegaly  Neurological -intubated, sedated, paralyzed  Extremities -no pedal edema noted  Skin -no rashes over exposed skin    MEDICATIONS:    Scheduled Meds:   ipratropium-albuterol  1 ampule Inhalation Q6H    famotidine (PEPCID) injection  20 mg Intravenous BID    anesthetic and narcotic custom mixture   Intrathecal Once    sodium chloride  500 mL Intravenous Once    hydrocortisone sodium succinate PF  100 mg Intravenous Q8H    azithromycin  500 mg Intravenous Q24H    cefTRIAXone (ROCEPHIN) IV  1 g Intravenous Q24H    aspirin  81 mg Oral Daily    levothyroxine  150 mcg Oral Daily    pregabalin  300 mg Oral BID    sodium chloride flush  10 mL Intravenous 2 times per day     Continuous Infusions:   dextrose 5% and 0.9% NaCl with KCl 20 mEq 50 mL/hr at 10/25/20 0819    phenylephrine (TOMÁS-SYNEPHRINE) 50mg/250mL infusion Stopped (10/25/20 0400)    norepinephrine Stopped (10/24/20 0644)    heparin (PORCINE) Infusion 22 Units/kg/hr (10/25/20 0421)    fentaNYL 100 mcg/hr (10/25/20 0911)    cisatracurium (NIMBEX) infusion 4 mcg/kg/min (10/25/20 0844)    midazolam 2 mg/hr (10/25/20 0845)    vasopressin (Septic Shock) infusion Stopped (10/25/20 0400)    amiodarone 0.5 mg/min (10/24/20 2357)    propofol Stopped (10/23/20 1230)     PRN Meds:   potassium chloride, 20 mEq, PRN  albuterol, 2.5 mg, As Directed RT PRN  heparin (porcine), 80 Units/kg, PRN  heparin (porcine), 40 Units/kg, PRN  sodium chloride flush, 10 mL, PRN  acetaminophen, 650 mg, Q4H PRN  ondansetron, 4 mg, Q8H PRN  [Held by provider] HYDROcodone 5 mg - acetaminophen, 2 tablet, Q8H PRN      VENT SETTINGS (Comprehensive) (if applicable):  Vent Information  $Ventilation: $Subsequent Day  Skin Assessment: Clean, dry, & intact  Equipment ID: [id number]  Equipment Changed: Expiratory Filter(insp filter)  Vent Type: Servo i  Vent Mode: PRVC  Vt Ordered: 480 mL  Rate Set: 22 bmp  FiO2 : 30 %  SpO2: 96 %  SpO2/FiO2 ratio: 320  Sensitivity: 3  PEEP/CPAP: 8  I Time/ I Time %: 0.9 s  Humidification Source: Heated wire  Humidification Temp: 37  Humidification Temp Measured: 36.7  Circuit Condensation: Drained  Nitric Oxide/Epoprostenol In Use?: No  Mask Type: Full face mask  Mask Size: Small  Additional Respiratory  Assessments  Pulse: 100  Resp: 22  SpO2: 96 %  $End Tidal CO2: 34  pCO2 (TCOM, mmHg): 42 mmHg  Position: Semi-Mcclendon's  Humidification Source: Heated wire  Humidification Temp: 37  Circuit Condensation: Drained  Oral Care Completed?: Yes  Oral Care: Teeth brushed, Mouth suctioned  Subglottic Suction Done?: Yes  Cuff Pressure (cm H2O): (mov)    ABGs: 7.4 / 43 / 108.8 / 28     Laboratory findings:    Complete Blood Count:   Recent Labs     10/23/20  0602 10/24/20  0358 10/25/20  0534   WBC 9.4 16.8* 13.7*   HGB 12.0 12.1 10.5*   HCT 38.9 39.5 33.3*    249 175        Last 3 Blood Glucose:   Recent Labs     10/23/20  0602 10/24/20  0358 10/25/20  0534   GLUCOSE 104* 157* 135*        PT/INR:    Lab Results   Component Value Date    PROTIME 12.7 10/22/2020    INR 1.0 10/22/2020     PTT:    Lab Results   Component Value Date    APTT 47.0 10/25/2020       Comprehensive Metabolic Profile:   Recent Labs     10/23/20  0602 10/23/20  1540 10/24/20  0358 10/25/20  0534     --  137 131*   K 3.9 3.9 4.3 3.2*   *  --  103 99   CO2 23  --  25 25   BUN 17  --  14 16   CREATININE 0.56  --  0.53 0.40*   GLUCOSE 104*  --  157* 135*   CALCIUM 8.2*  --  8.1* 8.6   PROT 5.2*  --  5.3* 5.5*   LABALBU 3.2*  --  2.7* 2.9*   BILITOT 0.31  --  0.36 0.38   ALKPHOS 38  --  55 66   AST 38*  --  23 13   ALT 13  --  11 9      Magnesium:   Lab Results   Component Value Date    MG 2.0 10/24/2020     Phosphorus: No results found for: PHOS  Ionized Calcium:   Lab Results   Component Value Date    CAION 1.17 10/24/2020        Urinalysis: see chart     Troponin: No results for input(s): TROPONINI in the last 72 hours.     Microbiology:    Cultures during this admission:     Blood cultures:                 [x] None drawn      [] Negative             []  Positive (Details:  )  Urine Culture:                   [x] None drawn      [] Negative             []  Positive (Details:  )  Sputum Culture: [] None drawn       [] Negative             []  Positive (Details:  )   Endotracheal aspirate:     [] None drawn       [] Negative             []  Positive (Details:  )     Other pertinent Labs: see charting    Radiology/Imaging:     Chest Xray (10/25/2020):   XR CHEST PORTABLE   Final Result   Partial clearing of bilateral lung opacities. XR CHEST PORTABLE   Final Result   Increased bilateral lung opacities. XR CHEST PORTABLE   Final Result   Bilateral lung opacities likely pneumonia. Enteric tube with the tip within the proximal stomach. Consider slightly   advancing. XR ABDOMEN FOR NG/OG/NE TUBE PLACEMENT   Final Result   Life-support devices as above. Similar-appearing diffuse bilateral heterogeneous opacities and multifocal   consolidations. XR CHEST PORTABLE   Final Result   Life-support devices as above. Similar-appearing diffuse bilateral heterogeneous opacities and multifocal   consolidations. XR CHEST PORTABLE   Final Result   No significant interval change in multifocal bilateral consolidation, most   concerning for pneumonia. XR CHEST PORTABLE   Final Result   No significant change in the multifocal airspace opacities worrisome for   multifocal pneumonia.          XR CHEST PORTABLE    (Results Pending)      ASSESSMENT:     Patient Active Problem List    Diagnosis Date Noted    Acute metabolic encephalopathy 55/58/9764     Priority: High     Class: Acute    Acute respiratory failure with hypoxia and hypercapnia (HCC)     Elevated troponin     Severe sepsis ([address] 10/22/2020    COPD exacerbation ([address] 10/22/2020    Sepsis due to pneumonia ([address] 08/16/2020    Lactic acidosis 08/16/2020    Septic shock ([address] 08/16/2020    Status post surgery 12/20/2018    Pneumonia of right upper lobe due to infectious organism 10/05/2018    Hypothyroidism 10/05/2018    Major depressive disorder 10/05/2018    Chronic midline low back pain without sciatica 10/05/2018    Iron deficiency anemia 10/05/2018     Additional assessment:    · Active Problems:  ·   Severe sepsis (HCC)  ·   COPD exacerbation (HCC)  ·   Acute respiratory failure with hypoxia and hypercapnia (HCC)  ·   Elevated troponin      PLAN:     WEAN PER PROTOCOL:  [] No   [x] Yes  [] N/A    DISCONTINUE ANY LABS:   [x] No   [] Yes    ICU PROPHYLAXIS:  Stress ulcer:  [] PPI Agent  [x] P0Fteap [] Sucralfate  [] Other:  VTE:   [] Enoxaparin  [x]  Heparin  [] EPC Cuffs    NUTRITION:  [] NPO [] Tube Feeding (Specify: ) [] TPN  [] PO (Diet: Diet NPO Effective Now)    HOME MEDICATIONS RECONCILED: [] No  [x] Yes    INSULIN DRIP:   [x] No   [] Yes    CONSULTATION NEEDED:  [x] No   [] Yes    FAMILY UPDATED:    [] No   [x] Yes    TRANSFER OUT OF ICU:   [x] No   [] Yes    ADDITIONAL PLAN:    1. Neuro  1. Versed gtt, Fentanyl gtt, nimbex gtt for ventilator dyssyncchrony. Bis monitor on. Pt Dilaudid pump on. Wean off paralytic as able. 2. CV  1. Off pressors as of this morning. 2. Amiodarone for a fib. 3. Heparin gtt for LV thrombus, elevated troponins. Cardiology consulted. 3. Pulm  1. Intubated. VBG : 7.4 / 43 / 108 / 28. PaO2/fiO2: 242  4. /Renal  1. Fluid overloaded. +5 L since admit. BNP elevated today. Will give 40 mg Lasix, reassess. Monitor UOP  2. Hyponatremia, mild. Switch MIVF to NS at 50cc/hr. Getting about 150 cc/hr with gtts  5. ID/Heme  1. Possible pneumonia: ID consulted. On azithromycin, rocephin. 2. BCx, negative. 6. Other  1. DVT ppx: heparin gtt, EPC cuffs  2. R fem CVC  3. Pepcid for GI PPX    Mirian Canales, DO  [address]  10/25/2020, 9:55 AM    Critical Care Attending Physician Addendum:    I have personally seen and examined Amanda Wang with the resident and the key elements of all parts of the encounter were performed by me. Patient was reassessed on more than one occasion, when required.  I reviewed the interval history, interpreted

## 2020-10-25 NOTE — PROGRESS NOTES
Daily    pregabalin  300 mg Oral BID    sodium chloride flush  10 mL Intravenous 2 times per day       Social History:     Social History     Socioeconomic History    Marital status:      Spouse name: Not on file    Number of children: Not on file    Years of education: Not on file    Highest education level: Not on file   Occupational History    Not on file   Social Needs    Financial resource strain: Not on file    Food insecurity     Worry: Not on file     Inability: Not on file    Transportation needs     Medical: Not on file     Non-medical: Not on file   Tobacco Use    Smoking status: Former Smoker     Last attempt to quit: 1976     Years since quittin.9    Smokeless tobacco: Never Used   Substance and Sexual Activity    Alcohol use: No    Drug use: No    Sexual activity: Not on file   Lifestyle    Physical activity     Days per week: Not on file     Minutes per session: Not on file    Stress: Not on file   Relationships    Social connections     Talks on phone: Not on file     Gets together: Not on file     Attends Yarsanism service: Not on file     Active member of club or organization: Not on file     Attends meetings of clubs or organizations: Not on file     Relationship status: Not on file    Intimate partner violence     Fear of current or ex partner: Not on file     Emotionally abused: Not on file     Physically abused: Not on file     Forced sexual activity: Not on file   Other Topics Concern    Not on file   Social History Narrative    Not on file       Family History:   No family history on file. Allergies:   Patient has no known allergies.      Review of Systems:   Review of Systems   Unable to perform ROS: Intubated          Physical Examination :     Patient Vitals for the past 8 hrs:   BP Temp Temp src Pulse Resp SpO2   10/25/20 1800 (!) 111/51 -- -- 79 22 97 %   10/25/20 1745 -- -- -- 79 22 98 %   10/25/20 1730 -- -- -- 80 22 97 %   10/25/20 1715 -- -- -- 84 21 98 %   10/25/20 1700 (!) 109/47 -- -- 84 22 97 %   10/25/20 1645 -- -- -- 90 21 98 %   10/25/20 1630 -- -- -- 96 21 98 %   10/25/20 1615 -- -- -- 85 22 98 %   10/25/20 1600 (!) 111/48 97.5 °F (36.4 °C) Axillary 87 21 97 %   10/25/20 1545 -- -- -- 87 22 97 %   10/25/20 1530 -- -- -- 88 22 97 %   10/25/20 1515 -- -- -- 90 21 96 %   10/25/20 1500 (!) 108/46 -- -- 92 22 96 %   10/25/20 1445 -- -- -- 94 22 97 %   10/25/20 1430 -- -- -- 96 21 97 %   10/25/20 1415 -- -- -- 91 22 97 %   10/25/20 1400 (!) 118/51 -- -- 88 22 98 %   10/25/20 1345 -- -- -- 79 22 98 %   10/25/20 1342 -- -- -- -- 22 98 %   10/25/20 1330 -- -- -- 79 22 98 %   10/25/20 1315 -- -- -- 80 21 99 %   10/25/20 1300 134/65 -- -- 79 22 99 %   10/25/20 1245 -- -- -- 80 22 100 %   10/25/20 1232 -- -- -- 84 22 98 %   10/25/20 1230 -- -- -- 84 22 98 %   10/25/20 1215 -- -- -- 85 22 99 %   10/25/20 1200 (!) 115/55 97.6 °F (36.4 °C) Axillary 84 22 99 %   10/25/20 1145 -- -- -- 82 22 99 %   10/25/20 1130 -- -- -- 94 22 96 %   10/25/20 1115 -- -- -- 96 20 96 %   10/25/20 1100 (!) 98/55 -- -- 97 17 95 %   10/25/20 1045 -- -- -- 99 14 94 %     Physical Exam  Constitutional:       General: She is not in acute distress. Appearance: Normal appearance. She is obese. She is ill-appearing. HENT:      Head: Normocephalic and atraumatic. Nose: Nose normal. No congestion. Mouth/Throat:      Mouth: Mucous membranes are moist.   Eyes:      General: No scleral icterus. Conjunctiva/sclera: Conjunctivae normal.   Neck:      Musculoskeletal: Neck supple. No neck rigidity. Cardiovascular:      Rate and Rhythm: Normal rate and regular rhythm. Heart sounds: Normal heart sounds. No murmur. Pulmonary:      Effort: No respiratory distress. Breath sounds: Rales present. Abdominal:      General: There is no distension. Palpations: Abdomen is soft. Tenderness: There is no abdominal tenderness.    Genitourinary:     Comments: Urine maral  R fem line in good condition  Musculoskeletal:         General: No swelling or tenderness. Skin:     Coloration: Skin is not jaundiced or pale. Neurological:      Comments: Sedated    Psychiatric:      Comments: Calm on the vent sedated          Medical Decision Making -Laboratory:   I have independently reviewed/ordered the following labs:    CBC with Differential:   Recent Labs     10/24/20  0358 10/25/20  0534   WBC 16.8* 13.7*   HGB 12.1 10.5*   HCT 39.5 33.3*    175   LYMPHOPCT 5* 3*   MONOPCT 3 2     BMP:   Recent Labs     10/23/20  1540 10/24/20  0358 10/25/20  0534   NA  --  137 131*   K 3.9 4.3 3.2*   CL  --  103 99   CO2  --  25 25   BUN  --  14 16   CREATININE  --  0.53 0.40*   MG 2.0 2.0  --      Hepatic Function Panel:   Recent Labs     10/24/20  0358 10/25/20  0534   PROT 5.3* 5.5*   LABALBU 2.7* 2.9*   BILITOT 0.36 0.38   ALKPHOS 55 66   ALT 11 9   AST 23 13     No results for input(s): RPR in the last 72 hours. No results for input(s): HIV in the last 72 hours. No results for input(s): BC in the last 72 hours.   Lab Results   Component Value Date    MUCUS 2+ 08/28/2019    RBC 3.42 10/25/2020    TRICHOMONAS NOT REPORTED 08/28/2019    WBC 13.7 10/25/2020    YEAST NOT REPORTED 08/28/2019    TURBIDITY CLEAR 10/22/2020     Lab Results   Component Value Date    CREATININE 0.40 10/25/2020    GLUCOSE 135 10/25/2020       Medical Decision Making-Imaging:   10/23/20:            Radhika Gonzalez MD

## 2020-10-25 NOTE — PLAN OF CARE
Problem: Skin Integrity:  Goal: Will show no infection signs and symptoms  Description: Will show no infection signs and symptoms  Outcome: Ongoing  Goal: Absence of new skin breakdown  Description: Absence of new skin breakdown  Outcome: Ongoing     Problem: Falls - Risk of:  Goal: Will remain free from falls  Description: Will remain free from falls  Outcome: Ongoing  Goal: Absence of physical injury  Description: Absence of physical injury  Outcome: Ongoing     Problem: Pain:  Goal: Pain level will decrease  Description: Pain level will decrease  Outcome: Ongoing  Goal: Control of acute pain  Description: Control of acute pain  Outcome: Ongoing  Goal: Control of chronic pain  Description: Control of chronic pain  Outcome: Ongoing     Problem: OXYGENATION/RESPIRATORY FUNCTION  Goal: Patient will maintain patent airway  10/25/2020 0152 by Papa Castro RN  Outcome: Ongoing  10/25/2020 0139 by Cristin Barajas RCP  Outcome: Ongoing  Goal: Patient will achieve/maintain normal respiratory rate/effort  Description: Respiratory rate and effort will be within normal limits for the patient  10/25/2020 0152 by Papa Castro RN  Outcome: Ongoing  10/25/2020 0139 by Cristin Barajas RCP  Outcome: Ongoing     Problem: MECHANICAL VENTILATION  Goal: Patient will maintain patent airway  10/25/2020 0152 by Papa Castro RN  Outcome: Ongoing  10/25/2020 0139 by Cristin Barajas RCP  Outcome: Ongoing  Goal: Oral health is maintained or improved  10/25/2020 0152 by Papa Castro RN  Outcome: Ongoing  10/25/2020 0139 by Cristin Barajas RCP  Outcome: Ongoing  Goal: ET tube will be managed safely  10/25/2020 0152 by Papa Castro RN  Outcome: Ongoing  10/25/2020 0139 by Cristin Barajas RCP  Outcome: Ongoing  Goal: Ability to express needs and understand communication  10/25/2020 0152 by Papa Castro RN  Outcome: Ongoing  10/25/2020 0139 by Cristin Barajas RCP  Outcome: Ongoing  Goal: Mobility/activity is maintained at optimum level for patient  10/25/2020 0152 by Papa Castro RN  Outcome: Ongoing  10/25/2020 0139 by Cristin Barajas RCP  Outcome: Ongoing     Problem: SKIN INTEGRITY  Goal: Skin integrity is maintained or improved  10/25/2020 0152 by Papa Castro RN  Outcome: Ongoing  10/25/2020 0139 by Cristin Barajas RCP  Outcome: Ongoing     Problem: Restraint Use - Nonviolent/Non-Self-Destructive Behavior:  Goal: Absence of restraint indications  Description: Absence of restraint indications  Outcome: Ongoing  Goal: Absence of restraint-related injury  Description: Absence of restraint-related injury  Outcome: Ongoing     Problem: Nutrition  Goal: Optimal nutrition therapy  Description: Nutrition Problem #1: Inadequate oral intake  Intervention: Food and/or Nutrient Delivery: Start nutrition as able; if TF, suggest Standard without Fiber goal 65 mL/hr to provide 1872 kcal and 87 g pro/day.   Nutritional Goals: meet % of estimated nutrient needs     Outcome: Ongoing

## 2020-10-25 NOTE — PLAN OF CARE
Problem: Skin Integrity:  Goal: Will show no infection signs and symptoms  Description: Will show no infection signs and symptoms  10/25/2020 1949 by Legrand Sicard, RN  Outcome: Ongoing  10/25/2020 0905 by Sangeeta Dennis RN  Outcome: Ongoing  Goal: Absence of new skin breakdown  Description: Absence of new skin breakdown  Outcome: Ongoing     Problem: Falls - Risk of:  Goal: Will remain free from falls  Description: Will remain free from falls  10/25/2020 1949 by Legrand Sicard, RN  Outcome: Ongoing  10/25/2020 0905 by Sangeeta Dennis RN  Outcome: Ongoing  Goal: Absence of physical injury  Description: Absence of physical injury  Outcome: Ongoing     Problem: Pain:  Goal: Pain level will decrease  Description: Pain level will decrease  10/25/2020 1949 by Legrand Sicard, RN  Outcome: Ongoing  10/25/2020 0905 by Sangeeta Dennis RN  Outcome: Ongoing  Goal: Control of acute pain  Description: Control of acute pain  Outcome: Ongoing  Goal: Control of chronic pain  Description: Control of chronic pain  Outcome: Ongoing     Problem: OXYGENATION/RESPIRATORY FUNCTION  Goal: Patient will maintain patent airway  10/25/2020 1949 by Legrand Sicard, RN  Outcome: Ongoing  10/25/2020 1030 by Marco A Coley RCP  Outcome: Ongoing  Goal: Patient will achieve/maintain normal respiratory rate/effort  Description: Respiratory rate and effort will be within normal limits for the patient  10/25/2020 1949 by Legrand Sicard, RN  Outcome: Ongoing  10/25/2020 1030 by Marco A Coley RCP  Outcome: Ongoing     Problem: MECHANICAL VENTILATION  Goal: Patient will maintain patent airway  10/25/2020 1949 by Legrand Sicard, RN  Outcome: Ongoing  10/25/2020 1030 by Marco A Coley RCP  Outcome: Ongoing  Goal: Oral health is maintained or improved  10/25/2020 1949 by Legrand Sicard, RN  Outcome: Ongoing  10/25/2020 1030 by Marco A Coley RCP  Outcome: Ongoing  Goal: ET tube will be managed safely  10/25/2020 1949 by Papa Castro RN  Outcome: Ongoing  10/25/2020 1030 by Mitzi Lucas RCP  Outcome: Ongoing  Goal: Ability to express needs and understand communication  Outcome: Ongoing  Goal: Mobility/activity is maintained at optimum level for patient  Outcome: Ongoing     Problem: SKIN INTEGRITY  Goal: Skin integrity is maintained or improved  Outcome: Ongoing     Problem: Restraint Use - Nonviolent/Non-Self-Destructive Behavior:  Goal: Absence of restraint indications  Description: Absence of restraint indications  Outcome: Ongoing  Goal: Absence of restraint-related injury  Description: Absence of restraint-related injury  Outcome: Ongoing     Problem: Nutrition  Goal: Optimal nutrition therapy  Description: Nutrition Problem #1: Inadequate oral intake  Intervention: Food and/or Nutrient Delivery: Start nutrition as able; if TF, suggest Standard without Fiber goal 65 mL/hr to provide 1872 kcal and 87 g pro/day.   Nutritional Goals: meet % of estimated nutrient needs     10/25/2020 1949 by Papa Castro RN  Outcome: Ongoing  10/25/2020 0905 by Cecilia Shetty RN  Outcome: Ongoing     Problem: Confusion - Acute:  Goal: Absence of continued neurological deterioration signs and symptoms  Description: Absence of continued neurological deterioration signs and symptoms  Outcome: Ongoing  Goal: Mental status will be restored to baseline  Description: Mental status will be restored to baseline  Outcome: Ongoing     Problem: Discharge Planning:  Goal: Ability to perform activities of daily living will improve  Description: Ability to perform activities of daily living will improve  Outcome: Ongoing  Goal: Participates in care planning  Description: Participates in care planning  Outcome: Ongoing     Problem: Injury - Risk of, Physical Injury:  Goal: Will remain free from falls  Description: Will remain free from falls  10/25/2020 1949 by Papa Castro RN  Outcome: Ongoing  10/25/2020 0905 by Cecilia Shetty

## 2020-10-26 ENCOUNTER — APPOINTMENT (OUTPATIENT)
Dept: GENERAL RADIOLOGY | Age: 75
DRG: 870 | End: 2020-10-26
Attending: INTERNAL MEDICINE
Payer: MEDICARE

## 2020-10-26 ENCOUNTER — APPOINTMENT (OUTPATIENT)
Dept: CARDIAC CATH/INVASIVE PROCEDURES | Age: 75
DRG: 870 | End: 2020-10-26
Attending: INTERNAL MEDICINE
Payer: MEDICARE

## 2020-10-26 LAB
ABSOLUTE EOS #: <0.03 K/UL (ref 0–0.44)
ABSOLUTE IMMATURE GRANULOCYTE: 0.12 K/UL (ref 0–0.3)
ABSOLUTE LYMPH #: 0.95 K/UL (ref 1.1–3.7)
ABSOLUTE MONO #: 1.13 K/UL (ref 0.1–1.2)
ALBUMIN SERPL-MCNC: 2.8 G/DL (ref 3.5–5.2)
ALBUMIN/GLOBULIN RATIO: 0.8 (ref 1–2.5)
ALLEN TEST: ABNORMAL
ALP BLD-CCNC: 97 U/L (ref 35–104)
ALT SERPL-CCNC: 9 U/L (ref 5–33)
ANION GAP SERPL CALCULATED.3IONS-SCNC: 11 MMOL/L (ref 9–17)
AST SERPL-CCNC: 13 U/L
BASOPHILS # BLD: 0 % (ref 0–2)
BASOPHILS ABSOLUTE: 0.03 K/UL (ref 0–0.2)
BILIRUB SERPL-MCNC: 0.36 MG/DL (ref 0.3–1.2)
BUN BLDV-MCNC: 15 MG/DL (ref 8–23)
BUN/CREAT BLD: ABNORMAL (ref 9–20)
CALCIUM SERPL-MCNC: 9.2 MG/DL (ref 8.6–10.4)
CHLORIDE BLD-SCNC: 109 MMOL/L (ref 98–107)
CO2: 26 MMOL/L (ref 20–31)
CREAT SERPL-MCNC: 0.41 MG/DL (ref 0.5–0.9)
DIFFERENTIAL TYPE: ABNORMAL
EOSINOPHILS RELATIVE PERCENT: 0 % (ref 1–4)
FIO2: 30
GFR AFRICAN AMERICAN: >60 ML/MIN
GFR NON-AFRICAN AMERICAN: >60 ML/MIN
GFR SERPL CREATININE-BSD FRML MDRD: ABNORMAL ML/MIN/{1.73_M2}
GFR SERPL CREATININE-BSD FRML MDRD: ABNORMAL ML/MIN/{1.73_M2}
GLUCOSE BLD-MCNC: 116 MG/DL (ref 65–105)
GLUCOSE BLD-MCNC: 137 MG/DL (ref 65–105)
GLUCOSE BLD-MCNC: 142 MG/DL (ref 70–99)
HCT VFR BLD CALC: 36 % (ref 36.3–47.1)
HEMOGLOBIN: 11.3 G/DL (ref 11.9–15.1)
IMMATURE GRANULOCYTES: 1 %
LACTIC ACID, WHOLE BLOOD: 1.7 MMOL/L (ref 0.7–2.1)
LACTIC ACID, WHOLE BLOOD: 2 MMOL/L (ref 0.7–2.1)
LYMPHOCYTES # BLD: 7 % (ref 24–43)
MCH RBC QN AUTO: 30.1 PG (ref 25.2–33.5)
MCHC RBC AUTO-ENTMCNC: 31.4 G/DL (ref 28.4–34.8)
MCV RBC AUTO: 95.7 FL (ref 82.6–102.9)
MODE: ABNORMAL
MONOCYTES # BLD: 8 % (ref 3–12)
MYCOPLASMA PNEUMONIAE IGM: 0.33
NEGATIVE BASE EXCESS, ART: ABNORMAL (ref 0–2)
NRBC AUTOMATED: 0 PER 100 WBC
O2 DEVICE/FLOW/%: ABNORMAL
PARTIAL THROMBOPLASTIN TIME: 21.4 SEC (ref 20.5–30.5)
PARTIAL THROMBOPLASTIN TIME: 36.6 SEC (ref 20.5–30.5)
PARTIAL THROMBOPLASTIN TIME: 49 SEC (ref 20.5–30.5)
PATIENT TEMP: ABNORMAL
PDW BLD-RTO: 13.9 % (ref 11.8–14.4)
PLATELET # BLD: 201 K/UL (ref 138–453)
PLATELET ESTIMATE: ABNORMAL
PMV BLD AUTO: 10.5 FL (ref 8.1–13.5)
POC HCO3: 28.8 MMOL/L (ref 21–28)
POC O2 SATURATION: 91 % (ref 94–98)
POC PCO2 TEMP: ABNORMAL MM HG
POC PCO2: 39.7 MM HG (ref 35–48)
POC PH TEMP: ABNORMAL
POC PH: 7.47 (ref 7.35–7.45)
POC PO2 TEMP: ABNORMAL MM HG
POC PO2: 57.5 MM HG (ref 83–108)
POSITIVE BASE EXCESS, ART: 5 (ref 0–3)
POTASSIUM SERPL-SCNC: 3.2 MMOL/L (ref 3.7–5.3)
RBC # BLD: 3.76 M/UL (ref 3.95–5.11)
RBC # BLD: ABNORMAL 10*6/UL
SAMPLE SITE: ABNORMAL
SEG NEUTROPHILS: 84 % (ref 36–65)
SEGMENTED NEUTROPHILS ABSOLUTE COUNT: 11.94 K/UL (ref 1.5–8.1)
SODIUM BLD-SCNC: 146 MMOL/L (ref 135–144)
SODIUM BLD-SCNC: 150 MMOL/L (ref 135–144)
TCO2 (CALC), ART: 30 MMOL/L (ref 22–29)
TOTAL PROTEIN: 6.1 G/DL (ref 6.4–8.3)
WBC # BLD: 14.2 K/UL (ref 3.5–11.3)
WBC # BLD: ABNORMAL 10*3/UL

## 2020-10-26 PROCEDURE — 82803 BLOOD GASES ANY COMBINATION: CPT

## 2020-10-26 PROCEDURE — 6370000000 HC RX 637 (ALT 250 FOR IP): Performed by: STUDENT IN AN ORGANIZED HEALTH CARE EDUCATION/TRAINING PROGRAM

## 2020-10-26 PROCEDURE — 6360000004 HC RX CONTRAST MEDICATION

## 2020-10-26 PROCEDURE — 6360000002 HC RX W HCPCS: Performed by: STUDENT IN AN ORGANIZED HEALTH CARE EDUCATION/TRAINING PROGRAM

## 2020-10-26 PROCEDURE — 80053 COMPREHEN METABOLIC PANEL: CPT

## 2020-10-26 PROCEDURE — 94640 AIRWAY INHALATION TREATMENT: CPT

## 2020-10-26 PROCEDURE — 93458 L HRT ARTERY/VENTRICLE ANGIO: CPT | Performed by: INTERNAL MEDICINE

## 2020-10-26 PROCEDURE — 2700000000 HC OXYGEN THERAPY PER DAY

## 2020-10-26 PROCEDURE — 84295 ASSAY OF SERUM SODIUM: CPT

## 2020-10-26 PROCEDURE — 2580000003 HC RX 258: Performed by: STUDENT IN AN ORGANIZED HEALTH CARE EDUCATION/TRAINING PROGRAM

## 2020-10-26 PROCEDURE — 2500000003 HC RX 250 WO HCPCS: Performed by: STUDENT IN AN ORGANIZED HEALTH CARE EDUCATION/TRAINING PROGRAM

## 2020-10-26 PROCEDURE — 4A023N7 MEASUREMENT OF CARDIAC SAMPLING AND PRESSURE, LEFT HEART, PERCUTANEOUS APPROACH: ICD-10-PCS | Performed by: INTERNAL MEDICINE

## 2020-10-26 PROCEDURE — C1894 INTRO/SHEATH, NON-LASER: HCPCS

## 2020-10-26 PROCEDURE — 94770 HC ETCO2 MONITOR DAILY: CPT

## 2020-10-26 PROCEDURE — 2500000003 HC RX 250 WO HCPCS

## 2020-10-26 PROCEDURE — 83605 ASSAY OF LACTIC ACID: CPT

## 2020-10-26 PROCEDURE — 85025 COMPLETE CBC W/AUTO DIFF WBC: CPT

## 2020-10-26 PROCEDURE — B2111ZZ FLUOROSCOPY OF MULTIPLE CORONARY ARTERIES USING LOW OSMOLAR CONTRAST: ICD-10-PCS | Performed by: INTERNAL MEDICINE

## 2020-10-26 PROCEDURE — 2709999900 HC NON-CHARGEABLE SUPPLY

## 2020-10-26 PROCEDURE — 99233 SBSQ HOSP IP/OBS HIGH 50: CPT | Performed by: INTERNAL MEDICINE

## 2020-10-26 PROCEDURE — 94761 N-INVAS EAR/PLS OXIMETRY MLT: CPT

## 2020-10-26 PROCEDURE — 37799 UNLISTED PX VASCULAR SURGERY: CPT

## 2020-10-26 PROCEDURE — 71045 X-RAY EXAM CHEST 1 VIEW: CPT

## 2020-10-26 PROCEDURE — 94003 VENT MGMT INPAT SUBQ DAY: CPT

## 2020-10-26 PROCEDURE — 6360000002 HC RX W HCPCS

## 2020-10-26 PROCEDURE — 2000000000 HC ICU R&B

## 2020-10-26 PROCEDURE — C1769 GUIDE WIRE: HCPCS

## 2020-10-26 PROCEDURE — 99291 CRITICAL CARE FIRST HOUR: CPT | Performed by: INTERNAL MEDICINE

## 2020-10-26 PROCEDURE — 82947 ASSAY GLUCOSE BLOOD QUANT: CPT

## 2020-10-26 PROCEDURE — 85730 THROMBOPLASTIN TIME PARTIAL: CPT

## 2020-10-26 PROCEDURE — B2151ZZ FLUOROSCOPY OF LEFT HEART USING LOW OSMOLAR CONTRAST: ICD-10-PCS | Performed by: INTERNAL MEDICINE

## 2020-10-26 RX ORDER — ACETAMINOPHEN 325 MG/1
650 TABLET ORAL EVERY 4 HOURS PRN
Status: DISCONTINUED | OUTPATIENT
Start: 2020-10-26 | End: 2020-11-06 | Stop reason: HOSPADM

## 2020-10-26 RX ORDER — LORAZEPAM 2 MG/ML
4 INJECTION INTRAMUSCULAR ONCE
Status: COMPLETED | OUTPATIENT
Start: 2020-10-26 | End: 2020-10-26

## 2020-10-26 RX ORDER — SODIUM CHLORIDE 0.9 % (FLUSH) 0.9 %
10 SYRINGE (ML) INJECTION EVERY 12 HOURS SCHEDULED
Status: DISCONTINUED | OUTPATIENT
Start: 2020-10-26 | End: 2020-11-06 | Stop reason: HOSPADM

## 2020-10-26 RX ORDER — LORAZEPAM 2 MG/ML
4 INJECTION INTRAMUSCULAR ONCE
Status: COMPLETED | OUTPATIENT
Start: 2020-10-27 | End: 2020-10-27

## 2020-10-26 RX ORDER — LORAZEPAM 2 MG/ML
INJECTION INTRAMUSCULAR
Status: COMPLETED
Start: 2020-10-26 | End: 2020-10-26

## 2020-10-26 RX ORDER — ASPIRIN 81 MG/1
81 TABLET, CHEWABLE ORAL DAILY
Status: DISCONTINUED | OUTPATIENT
Start: 2020-10-26 | End: 2020-11-06 | Stop reason: HOSPADM

## 2020-10-26 RX ORDER — SODIUM CHLORIDE 0.9 % (FLUSH) 0.9 %
10 SYRINGE (ML) INJECTION PRN
Status: DISCONTINUED | OUTPATIENT
Start: 2020-10-26 | End: 2020-11-06 | Stop reason: HOSPADM

## 2020-10-26 RX ORDER — HEPARIN SODIUM 1000 [USP'U]/ML
40 INJECTION, SOLUTION INTRAVENOUS; SUBCUTANEOUS PRN
Status: DISCONTINUED | OUTPATIENT
Start: 2020-10-26 | End: 2020-10-28

## 2020-10-26 RX ADMIN — LEVOTHYROXINE SODIUM 150 MCG: 75 TABLET ORAL at 08:41

## 2020-10-26 RX ADMIN — HEPARIN SODIUM 28 UNITS/KG/HR: 10000 INJECTION, SOLUTION INTRAVENOUS at 05:54

## 2020-10-26 RX ADMIN — POTASSIUM CHLORIDE, DEXTROSE MONOHYDRATE AND SODIUM CHLORIDE: 150; 5; 900 INJECTION, SOLUTION INTRAVENOUS at 03:35

## 2020-10-26 RX ADMIN — IPRATROPIUM BROMIDE AND ALBUTEROL SULFATE 1 AMPULE: .5; 3 SOLUTION RESPIRATORY (INHALATION) at 08:11

## 2020-10-26 RX ADMIN — Medication 100 MCG/HR: at 04:19

## 2020-10-26 RX ADMIN — LORAZEPAM 4 MG: 2 INJECTION INTRAMUSCULAR at 21:12

## 2020-10-26 RX ADMIN — SODIUM CHLORIDE, PRESERVATIVE FREE 10 ML: 5 INJECTION INTRAVENOUS at 20:31

## 2020-10-26 RX ADMIN — Medication 125 MCG/HR: at 11:45

## 2020-10-26 RX ADMIN — CEFTRIAXONE SODIUM 1 G: 1 INJECTION, POWDER, FOR SOLUTION INTRAMUSCULAR; INTRAVENOUS at 08:29

## 2020-10-26 RX ADMIN — FAMOTIDINE 20 MG: 10 INJECTION INTRAVENOUS at 20:32

## 2020-10-26 RX ADMIN — HEPARIN SODIUM 2860 UNITS: 1000 INJECTION INTRAVENOUS; SUBCUTANEOUS at 04:59

## 2020-10-26 RX ADMIN — FAMOTIDINE 20 MG: 10 INJECTION INTRAVENOUS at 08:30

## 2020-10-26 RX ADMIN — PROPOFOL 20 MCG/KG/MIN: 10 INJECTION, EMULSION INTRAVENOUS at 09:15

## 2020-10-26 RX ADMIN — SODIUM CHLORIDE, PRESERVATIVE FREE 10 ML: 5 INJECTION INTRAVENOUS at 08:35

## 2020-10-26 RX ADMIN — PREGABALIN 300 MG: 100 CAPSULE ORAL at 08:36

## 2020-10-26 RX ADMIN — PHENYLEPHRINE HYDROCHLORIDE 20 MCG/MIN: 10 INJECTION INTRAVENOUS at 03:35

## 2020-10-26 RX ADMIN — HYDROCORTISONE SODIUM SUCCINATE 50 MG: 100 INJECTION, POWDER, FOR SOLUTION INTRAMUSCULAR; INTRAVENOUS at 17:21

## 2020-10-26 RX ADMIN — ASPIRIN 81 MG: 81 TABLET, CHEWABLE ORAL at 20:42

## 2020-10-26 RX ADMIN — AMIODARONE HYDROCHLORIDE 0.5 MG/MIN: 50 INJECTION, SOLUTION INTRAVENOUS at 19:31

## 2020-10-26 RX ADMIN — HEPARIN SODIUM 2860 UNITS: 1000 INJECTION INTRAVENOUS; SUBCUTANEOUS at 19:31

## 2020-10-26 RX ADMIN — AZITHROMYCIN MONOHYDRATE 500 MG: 500 INJECTION, POWDER, LYOPHILIZED, FOR SOLUTION INTRAVENOUS at 09:06

## 2020-10-26 RX ADMIN — PREGABALIN 300 MG: 100 CAPSULE ORAL at 20:31

## 2020-10-26 RX ADMIN — IPRATROPIUM BROMIDE AND ALBUTEROL SULFATE 1 AMPULE: .5; 3 SOLUTION RESPIRATORY (INHALATION) at 20:25

## 2020-10-26 RX ADMIN — POTASSIUM CHLORIDE 20 MEQ: 29.8 INJECTION, SOLUTION INTRAVENOUS at 05:08

## 2020-10-26 RX ADMIN — HYDROCORTISONE SODIUM SUCCINATE 50 MG: 100 INJECTION, POWDER, FOR SOLUTION INTRAMUSCULAR; INTRAVENOUS at 08:29

## 2020-10-26 RX ADMIN — IPRATROPIUM BROMIDE AND ALBUTEROL SULFATE 1 AMPULE: .5; 3 SOLUTION RESPIRATORY (INHALATION) at 03:53

## 2020-10-26 RX ADMIN — AMIODARONE HYDROCHLORIDE 0.5 MG/MIN: 50 INJECTION, SOLUTION INTRAVENOUS at 05:54

## 2020-10-26 RX ADMIN — LORAZEPAM 4 MG: 2 INJECTION INTRAMUSCULAR; INTRAVENOUS at 21:12

## 2020-10-26 RX ADMIN — PROPOFOL 40 MCG/KG/MIN: 10 INJECTION, EMULSION INTRAVENOUS at 20:31

## 2020-10-26 RX ADMIN — HEPARIN SODIUM 30 UNITS/KG/HR: 10000 INJECTION, SOLUTION INTRAVENOUS at 19:31

## 2020-10-26 RX ADMIN — POTASSIUM CHLORIDE 20 MEQ: 29.8 INJECTION, SOLUTION INTRAVENOUS at 08:42

## 2020-10-26 RX ADMIN — IPRATROPIUM BROMIDE AND ALBUTEROL SULFATE 1 AMPULE: .5; 3 SOLUTION RESPIRATORY (INHALATION) at 15:31

## 2020-10-26 ASSESSMENT — PAIN SCALES - GENERAL
PAINLEVEL_OUTOF10: 4
PAINLEVEL_OUTOF10: 0
PAINLEVEL_OUTOF10: 0

## 2020-10-26 ASSESSMENT — PULMONARY FUNCTION TESTS
PIF_VALUE: 18
PIF_VALUE: 21
PIF_VALUE: 9
PIF_VALUE: 20
PIF_VALUE: 24
PIF_VALUE: 20
PIF_VALUE: 24
PIF_VALUE: 26
PIF_VALUE: 10
PIF_VALUE: 11
PIF_VALUE: 14

## 2020-10-26 NOTE — PROGRESS NOTES
Critical Care Team - Daily Progress Note      Date and time: 10/26/2020 7:47 AM  Patient's name:  Ally Heart  Medical Record Number: [id number]  Patient's account/billing number: [de-identified]  Patient's YOB: 1945  Age: 77 y.o. Date of Admission: 10/22/2020  6:58 PM  Length of stay during current admission: 4      Primary Care Physician: Romel Guerra MD  ICU Attending Physician: Dr. Mouna Olsen    Code Status: Full Code    Reason for ICU admission: sepsis, fluid overload       SUBJECTIVE:     OVERNIGHT EVENTS:   Patient was seen and examined at bedside, no acute events overnight. She continues to be intubated and sedated, on propofol 30, fentanyl PCA for sedation. PRVC 22/480/8/45. Blood gases 7.46/39.7/57.5/28. 8. Hemodynamically stable, /56 mmHg, /min, normal sinus rhythm, off pressor support. T-max 99 overnight. She continues to be on amiodarone 0.5 and heparin drip due to concern for atrial fibrillation and LV clot. 2D echo revealed EF 30%, significantly reduced from 55% in 2019, tube feeds held today a.m., plan for cardiac catheterization today. She is currently on Rocephin, azithromycin and Solu-Cortef 50 3 times daily due to concern for multifocal pneumonia. Received 1 dose of Lasix 40 mg IV yesterday.  I/) - 3.3/5.3    AWAKE & FOLLOWING COMMANDS:  [x] No   [] Yes    CURRENT VENTILATION STATUS:     [x] Ventilator  [] BIPAP  [] Nasal Cannula [] Room Air      IF INTUBATED, ET TUBE MARKING AT LOWER LIP:       cms    SECRETIONS Amount:  [x] Small [] Moderate  [] Large  [] None  Color:     [] White [] Colored  [] Bloody    SEDATION:  RAAS Score:  [] Propofol gtt  [x] Versed gtt  [] Ativan gtt   [] No Sedation    PARALYZED:  [] No    [x] Yes    DIARRHEA:                [x] No                [] Yes  (C. Difficile status: [] positive                                                                                                                       [] negative [] pending)    VASOPRESSORS:  [x] No    [] Yes    If yes -   [] Levophed       [] Dopamine     [] Vasopressin       [] Dobutamine  [] Phenylephrine         [] Epinephrine    CENTRAL LINES:     [] No   [x] Yes   (Date of Insertion:10/23/20   )           If yes -     [] Right IJ     [] Left IJ [x] Right Femoral [] Left Femoral                   [] Right Subclavian [] Left Subclavian       LUNA'S CATHETER:   [] No   [x] Yes  (Date of Insertion:  10/22/20 )     URINE OUTPUT:            [x] Good   [] Low              [] Anuric      OBJECTIVE:     VITAL SIGNS:  BP (!) 103/51   Pulse 78   Temp 98.6 °F (37 °C) (Oral)   Resp 20   Ht 5' 4\" (1.626 m)   Wt 164 lb 3.9 oz (74.5 kg)   SpO2 100%   BMI 28.19 kg/m²     Tmax over 24 hours:  Temp (24hrs), Av.2 °F (36.8 °C), Min:97.5 °F (36.4 °C), Max:99 °F (37.2 °C)      Patient Vitals for the past 6 hrs:   BP Temp Temp src Pulse Resp SpO2 Weight   10/26/20 0722 -- -- -- -- 20 100 % --   10/26/20 0700 (!) 103/51 -- -- 78 21 100 % --   10/26/20 0645 -- -- -- 79 21 100 % --   10/26/20 0630 -- -- -- 81 20 100 % --   10/26/20 0615 -- -- -- 87 16 100 % --   10/26/20 0600 135/62 -- -- 99 17 100 % --   10/26/20 0545 -- -- -- 91 15 99 % --   10/26/20 0530 -- -- -- 88 19 100 % --   10/26/20 0515 -- -- -- 99 14 93 % --   10/26/20 0500 (!) 124/53 -- -- 110 17 94 % --   10/26/20 0445 -- -- -- 115 21 93 % --   10/26/20 0430 -- -- -- 108 15 93 % --   10/26/20 0415 -- -- -- 113 20 96 % --   10/26/20 0400 (!) 149/79 -- -- 98 23 99 % --   10/26/20 0354 -- -- -- 81 21 95 % --   10/26/20 0345 -- -- -- 84 23 94 % --   10/26/20 0330 -- -- -- 88 23 96 % --   10/26/20 0315 -- -- -- 91 21 98 % --   10/26/20 0300 (!) 140/73 98.6 °F (37 °C) Oral 72 21 99 % 164 lb 3.9 oz (74.5 kg)   10/26/20 0245 -- -- -- 75 21 98 % --   10/26/20 0230 -- -- -- 74 21 99 % --   10/26/20 0215 -- -- -- 76 22 99 % -- 10/26/20 0200 128/62 -- -- 77 22 99 % --         Intake/Output Summary (Last 24 hours) at 10/26/2020 0747  Last data filed at 10/26/2020 0600  Gross per 24 hour   Intake 3386 ml   Output 5375 ml   Net -1989 ml     Wt Readings from Last 2 Encounters:   10/26/20 164 lb 3.9 oz (74.5 kg)   10/22/20 164 lb (74.4 kg)     Body mass index is 28.19 kg/m².         PHYSICAL EXAMINATION:  General appearance -intubated, sedated, and paralyzed  Mental status -intubated and sedated  Eyes - PERRL  Chest - clear to auscultation, no wheezes, rales or rhonchi, symmetric air entry  Heart - normal rate and regular rhythm  Abdomen - soft, nontender, nondistended, no masses or organomegaly  Neurological -intubated, sedated, paralyzed  Extremities -no pedal edema noted  Skin -no rashes over exposed skin    MEDICATIONS:    Scheduled Meds:   potassium bicarb-citric acid  40 mEq Oral Once    hydrocortisone sodium succinate PF  50 mg Intravenous Q8H    ipratropium-albuterol  1 ampule Inhalation Q6H    famotidine (PEPCID) injection  20 mg Intravenous BID    anesthetic and narcotic custom mixture   Intrathecal Once    sodium chloride  500 mL Intravenous Once    azithromycin  500 mg Intravenous Q24H    cefTRIAXone (ROCEPHIN) IV  1 g Intravenous Q24H    aspirin  81 mg Oral Daily    levothyroxine  150 mcg Oral Daily    pregabalin  300 mg Oral BID    sodium chloride flush  10 mL Intravenous 2 times per day     Continuous Infusions:   dextrose 5% and 0.9% NaCl with KCl 20 mEq 50 mL/hr at 10/26/20 0335    propofol 30 mcg/kg/min (10/26/20 0457)    phenylephrine (TOMÁS-SYNEPHRINE) 50mg/250mL infusion 30 mcg/min (10/26/20 0612)    norepinephrine Stopped (10/24/20 0644)    heparin (PORCINE) Infusion 28 Units/kg/hr (10/26/20 0554)    fentaNYL 100 mcg/hr (10/26/20 0419)    cisatracurium (NIMBEX) infusion Stopped (10/25/20 1003)    midazolam Stopped (10/25/20 1302)    vasopressin (Septic Shock) infusion Stopped (10/25/20 0400)    amiodarone 0.5 mg/min (10/26/20 0554)    propofol Stopped (10/23/20 1230)     PRN Meds:   potassium chloride, 20 mEq, PRN  albuterol, 2.5 mg, As Directed RT PRN  heparin (porcine), 80 Units/kg, PRN  heparin (porcine), 40 Units/kg, PRN  sodium chloride flush, 10 mL, PRN  acetaminophen, 650 mg, Q4H PRN  ondansetron, 4 mg, Q8H PRN  [Held by provider] HYDROcodone 5 mg - acetaminophen, 2 tablet, Q8H PRN      VENT SETTINGS (Comprehensive) (if applicable):  Vent Information  $Ventilation: $Subsequent Day  Skin Assessment: Clean, dry, & intact  Equipment ID: [id number]  Equipment Changed: Expiratory Filter(insp filter)  Vent Type: Servo i  Vent Mode: PRVC  Vt Ordered: 480 mL  Rate Set: 22 bmp  FiO2 : 45 %  SpO2: 100 %  SpO2/FiO2 ratio: 222.22  Sensitivity: 3  PEEP/CPAP: 8  I Time/ I Time %: 0.9 s  Humidification Source: Heated wire  Humidification Temp: 37  Humidification Temp Measured: 36.7  Circuit Condensation: Drained  Nitric Oxide/Epoprostenol In Use?: No  Mask Type: Full face mask  Mask Size: Small  Additional Respiratory  Assessments  Pulse: 78  Resp: 20  SpO2: 100 %  $End Tidal CO2: 36  pCO2 (TCOM, mmHg): 42 mmHg  Position: Semi-Mcclendon's  Humidification Source: Heated wire  Humidification Temp: 37  Circuit Condensation: Drained  Oral Care Completed?: Yes  Oral Care: Mouth suctioned, Mouthwash, Lip moisturizer applied, Mouth swabbed, Mouth moisturizer  Subglottic Suction Done?: Yes  Cuff Pressure (cm H2O): (mov)    ABGs: 7.4 / 43 / 108.8 / 28     Laboratory findings:    Complete Blood Count:   Recent Labs     10/24/20  0358 10/25/20  0534 10/26/20  0429   WBC 16.8* 13.7* 14.2*   HGB 12.1 10.5* 11.3*   HCT 39.5 33.3* 36.0*    175 201        Last 3 Blood Glucose:   Recent Labs     10/24/20  0358 10/25/20  0534 10/26/20  0429   GLUCOSE 157* 135* 142*        PT/INR:    Lab Results   Component Value Date    PROTIME 12.7 10/22/2020    INR 1.0 10/22/2020     PTT:    Lab Results   Component Value Date    APTT 49.0 10/26/2020       Comprehensive Metabolic Profile:   Recent Labs     10/24/20  0358 10/25/20  0534 10/26/20  0429    131* 146*   K 4.3 3.2* 3.2*    99 109*   CO2 25 25 26   BUN 14 16 15   CREATININE 0.53 0.40* 0.41*   GLUCOSE 157* 135* 142*   CALCIUM 8.1* 8.6 9.2   PROT 5.3* 5.5* 6.1*   LABALBU 2.7* 2.9* 2.8*   BILITOT 0.36 0.38 0.36   ALKPHOS 55 66 97   AST 23 13 13   ALT 11 9 9      Magnesium:   Lab Results   Component Value Date    MG 2.0 10/24/2020     Phosphorus: No results found for: PHOS  Ionized Calcium:   Lab Results   Component Value Date    CAION 1.17 10/24/2020        Urinalysis: see chart     Troponin: No results for input(s): TROPONINI in the last 72 hours. Microbiology:    Cultures during this admission:     Blood cultures:                 [x] None drawn      [] Negative             []  Positive (Details:  )  Urine Culture:                   [x] None drawn      [] Negative             []  Positive (Details:  )  Sputum Culture:               [] None drawn       [x] Negative             []  Positive (Details:  )   Endotracheal aspirate:     [] None drawn       [x] Negative             []  Positive (Details:  )     Other pertinent Labs: see charting    Radiology/Imaging:     Chest Xray (10/26/2020):   XR CHEST PORTABLE   Final Result   Coarse bilateral airspace opacities within the upper and lower lobes, not   significantly changed, suggesting a multi lobar pneumonia. XR CHEST PORTABLE   Final Result   Partial clearing of bilateral lung opacities. XR CHEST PORTABLE   Final Result   Increased bilateral lung opacities. XR CHEST PORTABLE   Final Result   Bilateral lung opacities likely pneumonia. Enteric tube with the tip within the proximal stomach. Consider slightly   advancing. XR ABDOMEN FOR NG/OG/NE TUBE PLACEMENT   Final Result   Life-support devices as above.       Similar-appearing diffuse bilateral heterogeneous opacities and multifocal consolidations. XR CHEST PORTABLE   Final Result   Life-support devices as above. Similar-appearing diffuse bilateral heterogeneous opacities and multifocal   consolidations. XR CHEST PORTABLE   Final Result   No significant interval change in multifocal bilateral consolidation, most   concerning for pneumonia. XR CHEST PORTABLE   Final Result   No significant change in the multifocal airspace opacities worrisome for   multifocal pneumonia.          XR CHEST PORTABLE    (Results Pending)      ASSESSMENT:     Patient Active Problem List    Diagnosis Date Noted    Acute metabolic encephalopathy 17/50/8104     Priority: High     Class: Acute    Acute respiratory failure with hypoxia and hypercapnia (HCC)     Elevated troponin     Severe sepsis ([address] 10/22/2020    COPD exacerbation ([address] 10/22/2020    Sepsis due to pneumonia [facility] 08/16/2020    Lactic acidosis 08/16/2020    Septic shock ([facility] [address] [facility] 08/16/2020    Status post surgery 12/20/2018    Pneumonia of right upper lobe due to infectious organism 10/05/2018    Hypothyroidism 10/05/2018    Major depressive disorder 10/05/2018    Chronic midline low back pain without sciatica 10/05/2018    Iron deficiency anemia 10/05/2018     Additional assessment:    · Active Problems:  ·   Severe sepsis (HCC)  ·   COPD exacerbation (HCC)  ·   Acute respiratory failure with hypoxia and hypercapnia (HCC)  ·   Elevated troponin      PLAN:     WEAN PER PROTOCOL:  [] No   [x] Yes  [] N/A    DISCONTINUE ANY LABS:   [x] No   [] Yes    ICU PROPHYLAXIS:  Stress ulcer:  [] PPI Agent  [x] [id number] [] Sucralfate  [] Other:  VTE:   [] Enoxaparin  [x]  Heparin  [] EPC Cuffs    NUTRITION:  [] NPO [] Tube Feeding (Specify: ) [] TPN  [] PO (Diet: Diet NPO Effective Now)    HOME MEDICATIONS RECONCILED: [] No  [x] Yes    INSULIN DRIP:   [x] No   [] Yes    CONSULTATION NEEDED:  [x] No   [] Yes    FAMILY UPDATED:    [] No   [x] Yes    TRANSFER OUT OF ICU:   [x] No   [] Yes    ADDITIONAL PLAN:    1. Acute hypoxic and hypercarbic respiratory failure secondary to COPD exacerbation, multifocal pneumonia and CHF exacerbation  - Continues to be intubated and sedated, wean off as tolerated. - Continue antibiotics and diuretics. 2. Multifocal pneumonia  - Continue azithromycin and rocephin, follow up respiratory cultures. - Follow up repeat CXR, appreciate ID recommendations. 3. New onset congestive heart failure with NSTEMI  - EF 30%, reduced from 50% in 2019, Cardiology on board. - Plan for cardiac cath today, tube feeds held. - Received 1 dose of lasix yesterday, output 5L. - Continue heparin gtt. - Continue aspirin 81 mg daily. 4. New onset atrial fibrillation with concern for LV thrombus on TTE  - Continue amiodarone gtt for now, heparin drip for anticoagulation.  - Currently in NSR, follow up Cardiology recommendations. 5. Septic shock secondary to multifocal pneumonia vs cardiogenic shock - Resolved  - Continue azithromycin and rocephin, follow up respiratory cultures. - Wean off steroids, 50 mg q8.    6. Hypothyroidism, newly diagnosed  - Continue Synthroid 50 mcg daily. DVT prophylaxis - On heparin gtt. GI prophylaxis - Pepcid 20 mg BID    Disposition - To remain in ICU    Sharon Todd MD      Department of Internal Medicine  [facility]         10/26/2020, 10:37 AM      Attending Physician Statement  I have discussed the care of Cleopatra Whitfield, including pertinent history and exam findings with the resident. I have reviewed the key elements of all parts of the encounter with the resident. I have seen and examined the patient with the resident. I agree with the assessment and plan and status of the problem list as documented. I seen the patient, chart seen, labs ventilator setting imaging data CT scan and chest x-ray seen and other events noted.   She has history of COPD and congestive heart failure apparently not known if she is on home O2. Treated with septic shock bilateral multifocal pneumonia on CT scan initially, initially was on pressors and weaned off currently off pressor. Respiratory culture growing lactose fermenting gram-negative rods. She had atrial fibrillation with rapid ventricular rate and on amiodarone digoxin was added converted to sinus rhythm, echocardiogram shows ejection fraction 35% with possible LV thrombus in her BNP has been elevated has been seen by cardiology and is scheduled for cardiac cath today. She had received Lasix yesterday and her urine output is 5375 with 1 dose of Lasix yesterday. She is currently on hydrocortisone 50 mg every 8 hours, she is also on propofol and fentanyl drip. Ventilator setting PRVC/22/40/8/40 5% and ABG 7.4 6/40/58/29. We will continue with Rocephin and follow-up cultures. Cardiac catheterization today. On amiodarone and heparin drip. Continue with current ventilator setting and decrease respiratory rate on ventilator. Continue monitor neurological status and mentation. Follow-up urine output and renal function    Discussed with nursing staff, treatment and plan discussed. Discussed with respiratory therapist for    Total critical care time caring for this patient with life threatening, unstable organ failure, including direct patient contact, management of life support systems, review of data including imaging and labs, discussions with other team members and physicians at least 28  Min so far today, excluding procedures.       Romy Curiel MD  10/26/2020 12:19 PM

## 2020-10-26 NOTE — PROGRESS NOTES
x-ray as well as CT scan which was negative for any pulmonary embolism but did show bilateral infiltrates. Found to have a lactic acidosis as well, and started on Rocephin and azithromycin. Initially hypercapnic, her mentation improved on BiPAP on 10/22. CURRENT EVALUATION: 10/26/20     Afebrile  Hypotensive /51  Patient on pressors on and off  Sedated, on ventilator, not weaning today. Patient examined at bedside in ICU, not alert, awake, not following commands. On Amiodarone and HR regular     CXR 10/24 increased bilat infiltrates  CXR today shows bilateral upper and lower lobe infiltrates unchanged from previous x-ray. NSTEMI, on heparin drip, new onset reduced systolic dysfunction, EF 55%, apical hypokinesis. Plan for cardiac cath today. Labs:    Lactic acid 6.2 > 2.9 > 2.0    Creatinine: 0.56  BUN: 17 > 16 > 15     WBC: 9.4 > 16.8 > 13.7 >14.2  Hgb: 12 >10.5 >11.3  Platelet: 366 >424 >698    Cultures:  Urine:  ·   Blood:  · 10/22/20: No growth   Sputum :  · 10/25/20: Lactose fermenting gram negative rods moderate growth. Wound:     CSF         Discussed with patient, RN,     I have personally reviewed the past medical history, past surgical history, medications, social history, and family history, and I have updated the database accordingly.   Past Medical History:     Past Medical History:   Diagnosis Date    COPD (chronic obstructive pulmonary disease) ([address]     Depression     GERD (gastroesophageal reflux disease)     Osteoporosis        Past Surgical  History:     Past Surgical History:   Procedure Laterality Date    BACK SURGERY      BREAST SURGERY      CARPAL TUNNEL RELEASE      FRACTURE SURGERY Left 12/20/2018    ORIF wrist    HYSTERECTOMY      JOINT REPLACEMENT      right knee    JOINT REPLACEMENT      WRIST FRACTURE SURGERY Left 12/20/2018    WRIST OPEN REDUCTION INTERNAL FIXATION-DISTAL RADIUS performed by Lina Castro MD at [facility]       Medications:      potassium bicarb-citric acid  40 mEq Oral Once    hydrocortisone sodium succinate PF  50 mg Intravenous Q8H    ipratropium-albuterol  1 ampule Inhalation Q6H    famotidine (PEPCID) injection  20 mg Intravenous BID    anesthetic and narcotic custom mixture   Intrathecal Once    sodium chloride  500 mL Intravenous Once    azithromycin  500 mg Intravenous Q24H    cefTRIAXone (ROCEPHIN) IV  1 g Intravenous Q24H    aspirin  81 mg Oral Daily    levothyroxine  150 mcg Oral Daily    pregabalin  300 mg Oral BID    sodium chloride flush  10 mL Intravenous 2 times per day       Social History:     Social History     Socioeconomic History    Marital status:      Spouse name: Not on file    Number of children: Not on file    Years of education: Not on file    Highest education level: Not on file   Occupational History    Not on file   Social Needs    Financial resource strain: Not on file    Food insecurity     Worry: Not on file     Inability: Not on file    Transportation needs     Medical: Not on file     Non-medical: Not on file   Tobacco Use    Smoking status: Former Smoker     Last attempt to quit: 1976     Years since quittin.9    Smokeless tobacco: Never Used   Substance and Sexual Activity    Alcohol use: No    Drug use: No    Sexual activity: Not on file   Lifestyle    Physical activity     Days per week: Not on file     Minutes per session: Not on file    Stress: Not on file   Relationships    Social connections     Talks on phone: Not on file     Gets together: Not on file     Attends [employer] service: Not on file     Active member of club or organization: Not on file     Attends meetings of clubs or organizations: Not on file     Relationship status: Not on file    Intimate partner violence     Fear of current or ex partner: Not on file     Emotionally abused: Not on file     Physically abused: Not on file     Forced sexual activity: Not on file   Other Topics Concern  Not on file   Social History Narrative    Not on file       Family History:   No family history on file. Allergies:   Patient has no known allergies. Review of Systems:   Review of Systems   Unable to perform ROS: Intubated          Physical Examination :     Patient Vitals for the past 8 hrs:   BP Temp Temp src Pulse Resp SpO2 Weight   10/26/20 0722 -- -- -- -- 20 100 % --   10/26/20 0700 (!) 103/51 -- -- 78 21 100 % --   10/26/20 0645 -- -- -- 79 21 100 % --   10/26/20 0630 -- -- -- 81 20 100 % --   10/26/20 0615 -- -- -- 87 16 100 % --   10/26/20 0600 135/62 -- -- 99 17 100 % --   10/26/20 0545 -- -- -- 91 15 99 % --   10/26/20 0530 -- -- -- 88 19 100 % --   10/26/20 0515 -- -- -- 99 14 93 % --   10/26/20 0500 (!) 124/53 -- -- 110 17 94 % --   10/26/20 0445 -- -- -- 115 21 93 % --   10/26/20 0430 -- -- -- 108 15 93 % --   10/26/20 0415 -- -- -- 113 20 96 % --   10/26/20 0400 (!) 149/79 -- -- 98 23 99 % --   10/26/20 0354 -- -- -- 81 21 95 % --   10/26/20 0345 -- -- -- 84 23 94 % --   10/26/20 0330 -- -- -- 88 23 96 % --   10/26/20 0315 -- -- -- 91 21 98 % --   10/26/20 0300 (!) 140/73 98.6 °F (37 °C) Oral 72 21 99 % 164 lb 3.9 oz (74.5 kg)   10/26/20 0245 -- -- -- 75 21 98 % --   10/26/20 0230 -- -- -- 74 21 99 % --   10/26/20 0215 -- -- -- 76 22 99 % --   10/26/20 0200 128/62 -- -- 77 22 99 % --   10/26/20 0145 -- -- -- 79 23 98 % --   10/26/20 0130 -- -- -- 80 21 99 % --   10/26/20 0115 -- -- -- 79 22 98 % --   10/26/20 0100 (!) 110/53 -- -- 80 22 97 % --   10/26/20 0045 -- -- -- 81 22 96 % --   10/26/20 0030 -- -- -- 83 22 94 % --   10/26/20 0015 -- -- -- 96 19 94 % --   10/26/20 0000 (!) 160/102 98.2 °F (36.8 °C) Oral 101 20 96 % --   10/25/20 2345 -- -- -- 100 19 96 % --     Physical Exam  Constitutional:       General: She is not in acute distress. Appearance: Normal appearance. She is obese. She is ill-appearing. HENT:      Head: Normocephalic and atraumatic.       Nose: Nose normal. No YEAST NOT REPORTED 08/28/2019    TURBIDITY CLEAR 10/22/2020     Lab Results   Component Value Date    CREATININE 0.41 10/26/2020    GLUCOSE 142 10/26/2020       Medical Decision Making-Imaging:   10/23/20:            Olga Xiong MD   PGY1, Internal Medicine    ATTESTATION:    I have discussed the case, including pertinent history and exam findings with the residents and students. I have seen and examined the patient and the key elements of the encounter have been performed by me. I was present when the student obtained his information or examined the patient. I have reviewed the laboratory data, other diagnostic studies and discussed them with the residents. I have updated the medical record where necessary. I agree with the assessment, plan and orders as documented by the resident/ student.     Katherine Asif MD.

## 2020-10-26 NOTE — PLAN OF CARE
Okay from Infectious disease standpoint for the patient to have her cardiac cath done today. ATTESTATION:    I have discussed the case, including pertinent history and exam findings with the residents and students. I have seen and examined the patient and the key elements of the encounter have been performed by me. I was present when the student obtained his information or examined the patient. I have reviewed the laboratory data, other diagnostic studies and discussed them with the residents. I have updated the medical record where necessary. I agree with the assessment, plan and orders as documented by the resident/ student.     Sierra Adams MD.

## 2020-10-26 NOTE — PROGRESS NOTES
[facility]   Progress Note                   Date:   10/26/2020  Patient name: Dennis Acevedo  Date of admission:  10/22/2020  6:58 PM  MRN:   [id number]  YOB: 1945  PCP: Christal Cobb MD    Reason for Admission:     Subjective:     Hold tube feeds  Patient is intubated on FiO2 45%  Map is around 30 mics of phenylephrine    Urine output is 5.3 L since 24 hours     Intake/Output Summary (Last 24 hours) at 10/26/2020 0729  Last data filed at 10/26/2020 0600  Gross per 24 hour   Intake 3386 ml   Output 5375 ml   Net -1989 ml       Medications:   Scheduled Meds:   potassium bicarb-citric acid  40 mEq Oral Once    hydrocortisone sodium succinate PF  50 mg Intravenous Q8H    ipratropium-albuterol  1 ampule Inhalation Q6H    famotidine (PEPCID) injection  20 mg Intravenous BID    anesthetic and narcotic custom mixture   Intrathecal Once    sodium chloride  500 mL Intravenous Once    azithromycin  500 mg Intravenous Q24H    cefTRIAXone (ROCEPHIN) IV  1 g Intravenous Q24H    aspirin  81 mg Oral Daily    levothyroxine  150 mcg Oral Daily    pregabalin  300 mg Oral BID    sodium chloride flush  10 mL Intravenous 2 times per day     Continuous Infusions:   dextrose 5% and 0.9% NaCl with KCl 20 mEq 50 mL/hr at 10/26/20 0335    propofol 30 mcg/kg/min (10/26/20 0457)    phenylephrine (TOMÁS-SYNEPHRINE) 50mg/250mL infusion 30 mcg/min (10/26/20 0612)    norepinephrine Stopped (10/24/20 0644)    heparin (PORCINE) Infusion 28 Units/kg/hr (10/26/20 0554)    fentaNYL 100 mcg/hr (10/26/20 0419)    cisatracurium (NIMBEX) infusion Stopped (10/25/20 1003)    midazolam Stopped (10/25/20 1302)    vasopressin (Septic Shock) infusion Stopped (10/25/20 0400)    amiodarone 0.5 mg/min (10/26/20 0554)    propofol Stopped (10/23/20 1230)     CBC:   Recent Labs     10/24/20  0358 10/25/20  0534 10/26/20  0429   WBC 16.8* 13.7* 14.2*   HGB 12.1 10.5* 11.3*    175 201     BMP:    Recent Labs     10/24/20  0358 10/25/20  0534 10/26/20  0429    131* 146*   K 4.3 3.2* 3.2*    99 109*   CO2 25 25 26   BUN 14 16 15   CREATININE 0.53 0.40* 0.41*   GLUCOSE 157* 135* 142*     Hepatic:   Recent Labs     10/24/20  0358 10/25/20  0534 10/26/20  0429   AST 23 13 13   ALT 11 9 9   BILITOT 0.36 0.38 0.36   ALKPHOS 55 66 97         Objective:   Vitals: BP (!) 103/51   Pulse 78   Temp 98.6 °F (37 °C) (Oral)   Resp 20   Ht 5' 4\" (1.626 m)   Wt 164 lb 3.9 oz (74.5 kg)   SpO2 100%   BMI 28.19 kg/m²   Constitutional and General Appearance: alert, cooperative, no distress and appears stated age    Respiratory:  · Clear to auscultation bilaterally, with equal air entry  Cardiovascular:  · S1, s2, rrr, no pain on palpation of anterior chest wall. Abdomen:   · No masses or tenderness, soft abdomen  · Bowel sounds present  Extremities:  · No le edema, peripheral pulse bl le present 2 +  Integumentum:   Intact with no rashes noted      EKG:       Atrial flutter with variable A-V block  Inferior infarct (cited on or before 23-OCT-2020)  Anterior infarct (cited on or before 22-OCT-2020)  T wave abnormality, consider lateral ischemia  Abnormal ECG  When compared with ECG of 23-OCT-2020 19:46,  Atrial flutter has replaced Atrial fibrillation  Nonspecific T wave abnormality now evident in Inferior leads  T wave inversion now evident in Anterior leads      ECHO:10/22  Left ventricle is normal in size. Global left ventricular systolic function  is severely reduced. Estimated ejection fraction is 25-30 % . Calculated EF via heart model is 30 %. The apex appears akinetic. Cannot exclude an apical thrombus, consider  Definity. Mild mitral regurgitation. Mild to moderate tricuspid regurgitation. Estimated right ventricular  systolic pressure is 35 mmHg. IVC dilated but unable to assess respiratory collapse due to patient on  ventilator. Assessment / Acute Cardiac Problems:   1.  Acute hypoxic respiratory failure possibly secondary to bilateral lobe pneumonia, possibly secondary to pulmonary edema  2. Possible cardiogenic shock  3. Possible LV thrombus  4. New onset systolic dysfunction with EF of 25 to 30% (Takotsubo cardiomyopathy)  5. New onset A. fib/atrial flutter with RVR now with normal sinus rhythm  6. Rheumatoid arthritis on Plaquenil  7. No cardiac evaluation in the past  8. NSTEMI    Plan of Treatment:   1. Continue aspirin 81 mg daily  2. Continue amiodarone drip for paroxysmal A. fib now in normal sinus rhythm  3. Continue heparin for NSTEMI/prevention of stroke for A. fib's/LV thrombus  4. Hold tube feeds  5. Plan for left heart cath today    Will discuss with rounding attending Dr. Jennifer Thomas for final recommendations. Keeley Davis MD,  Internal Medicine Resident, PGY-3,   [address].  10/26/2020,7:29 AM       Attending Physician Statement  I have discussed the case of Keyur Pelayo including pertinent history and exam findings with the resident. I have seen and examined the patient and the key elements of the encounter have been performed by me. I agree with the assessment, plan and orders as documented by the resident With changes made to the note.      Electronically signed by Jose Sharp MD on 10/26/2020 at 4:30 PM.    [facility]      [phone]

## 2020-10-26 NOTE — PLAN OF CARE
Problem: OXYGENATION/RESPIRATORY FUNCTION  Goal: Patient will maintain patent airway  10/26/2020 0156 by Ruben James RCP  Outcome: Ongoing     Problem: OXYGENATION/RESPIRATORY FUNCTION  Goal: Patient will achieve/maintain normal respiratory rate/effort  Description: Respiratory rate and effort will be within normal limits for the patient  10/26/2020 0156 by Ruben James RCP  Outcome: Ongoing     Problem: MECHANICAL VENTILATION  Goal: Patient will maintain patent airway  10/26/2020 0156 by Ruben James RCP  Outcome: Ongoing     Problem: MECHANICAL VENTILATION  Goal: Oral health is maintained or improved  10/26/2020 0156 by Ruben James RCP  Outcome: Ongoing     Problem: MECHANICAL VENTILATION  Goal: ET tube will be managed safely  10/26/2020 0156 by Ruben James RCP  Outcome: Ongoing     Problem: MECHANICAL VENTILATION  Goal: Ability to express needs and understand communication  10/26/2020 0156 by Ruben James RCP  Outcome: Ongoing     Problem: MECHANICAL VENTILATION  Goal: Mobility/activity is maintained at optimum level for patient  10/26/2020 0156 by Ruben James RCP  Outcome: Ongoing     Problem: SKIN INTEGRITY  Goal: Skin integrity is maintained or improved  10/26/2020 0156 by Ruben James RCP  Outcome: Ongoing

## 2020-10-26 NOTE — PLAN OF CARE
Problem: Skin Integrity:  Goal: Will show no infection signs and symptoms  Description: Will show no infection signs and symptoms  Outcome: Ongoing     Problem: Skin Integrity:  Goal: Absence of new skin breakdown  Description: Absence of new skin breakdown  Outcome: Ongoing     Problem: Falls - Risk of:  Goal: Will remain free from falls  Description: Will remain free from falls  Outcome: Ongoing     Problem: Falls - Risk of:  Goal: Absence of physical injury  Description: Absence of physical injury  Outcome: Ongoing     Problem: Pain:  Goal: Pain level will decrease  Description: Pain level will decrease  Outcome: Ongoing     Problem: Pain:  Goal: Control of acute pain  Description: Control of acute pain  Outcome: Ongoing     Problem: Pain:  Goal: Control of chronic pain  Description: Control of chronic pain  Outcome: Ongoing     Problem: OXYGENATION/RESPIRATORY FUNCTION  Goal: Patient will maintain patent airway  10/26/2020 1538 by Gabbie Coyne RN  Outcome: Ongoing     Problem: OXYGENATION/RESPIRATORY FUNCTION  Goal: Patient will achieve/maintain normal respiratory rate/effort  Description: Respiratory rate and effort will be within normal limits for the patient  10/26/2020 1538 by Gabbie Coyne RN  Outcome: Ongoing     Problem: MECHANICAL VENTILATION  Goal: Patient will maintain patent airway  10/26/2020 1538 by Gabbie Coyne RN  Outcome: Ongoing     Problem: MECHANICAL VENTILATION  Goal: Oral health is maintained or improved  10/26/2020 1538 by Gabbie Coyne RN  Outcome: Ongoing     Problem: MECHANICAL VENTILATION  Goal: ET tube will be managed safely  10/26/2020 1538 by Gabbie Coyne RN  Outcome: Ongoing     Problem: MECHANICAL VENTILATION  Goal: Ability to express needs and understand communication  10/26/2020 1538 by Gabbie Coyne RN  Outcome: Ongoing     Problem: MECHANICAL VENTILATION  Goal: Mobility/activity is maintained at optimum level for patient  10/26/2020 1538 by Harlan Damon RN  Outcome: Ongoing     Problem: SKIN INTEGRITY  Goal: Skin integrity is maintained or improved  10/26/2020 1538 by Harlan Damon RN  Outcome: Ongoing     Problem: Restraint Use - Nonviolent/Non-Self-Destructive Behavior:  Goal: Absence of restraint indications  Description: Absence of restraint indications  Outcome: Ongoing     Problem: Restraint Use - Nonviolent/Non-Self-Destructive Behavior:  Goal: Absence of restraint-related injury  Description: Absence of restraint-related injury  Outcome: Ongoing     Problem: Confusion - Acute:  Goal: Absence of continued neurological deterioration signs and symptoms  Description: Absence of continued neurological deterioration signs and symptoms  Outcome: Ongoing     Problem: Confusion - Acute:  Goal: Mental status will be restored to baseline  Description: Mental status will be restored to baseline  Outcome: Ongoing     Problem: Discharge Planning:  Goal: Ability to perform activities of daily living will improve  Description: Ability to perform activities of daily living will improve  Outcome: Ongoing     Problem: Discharge Planning:  Goal: Participates in care planning  Description: Participates in care planning  Outcome: Ongoing     Problem: Injury - Risk of, Physical Injury:  Goal: Will remain free from falls  Description: Will remain free from falls  Outcome: Ongoing     Problem: Injury - Risk of, Physical Injury:  Goal: Absence of physical injury  Description: Absence of physical injury  Outcome: Ongoing     Problem: Mood - Altered:  Goal: Mood stable  Description: Mood stable  Outcome: Ongoing     Problem: Mood - Altered:  Goal: Absence of abusive behavior  Description: Absence of abusive behavior  Outcome: Ongoing     Problem: Mood - Altered:  Goal: Verbalizations of feeling emotionally comfortable while being cared for will increase  Description: Verbalizations of feeling emotionally comfortable while being cared for will increase  Outcome: Ongoing     Problem: Psychomotor Activity - Altered:  Goal: Absence of psychomotor disturbance signs and symptoms  Description: Absence of psychomotor disturbance signs and symptoms  Outcome: Ongoing     Problem: Sensory Perception - Impaired:  Goal: Demonstrations of improved sensory functioning will increase  Description: Demonstrations of improved sensory functioning will increase  Outcome: Ongoing     Problem: Sensory Perception - Impaired:  Goal: Decrease in sensory misperception frequency  Description: Decrease in sensory misperception frequency  Outcome: Ongoing     Problem: Sensory Perception - Impaired:  Goal: Able to refrain from responding to false sensory perceptions  Description: Able to refrain from responding to false sensory perceptions  Outcome: Ongoing     Problem: Sensory Perception - Impaired:  Goal: Demonstrates accurate environmental perceptions  Description: Demonstrates accurate environmental perceptions  Outcome: Ongoing     Problem: Sensory Perception - Impaired:  Goal: Able to distinguish between reality-based and nonreality-based thinking  Description: Able to distinguish between reality-based and nonreality-based thinking  Outcome: Ongoing     Problem: Sleep Pattern Disturbance:  Goal: Appears well-rested  Description: Appears well-rested  Outcome: Ongoing

## 2020-10-26 NOTE — OP NOTE
[facility]    CARDIAC CATHETERIZATION    Date:   10/26/2020  Patient name:  Syliva Gowers  Date of admission:  10/22/2020  6:58 PM  MRN:   [id number]  YOB: 1945    Operators:  Primary:   Nalini Villalobos MD (Attending Physician)    Assistant/CV fellow: Dr. Kyaw Hernandez     Procedure performed:     [x] Left Heart Catheterization. [] Graft Angiography. [x] Left Ventriculography. [] Right Heart Catheterization. [] Coronary Angiography. [] Aortic Valve Studies. [] PCI:      [] Other:       Pre Procedure Conscious Sedation Data:  ASA Class:    [] I [x] II [] III [] IV    Mallampati Class:  [] I [x] II [] III [] IV      Indication:  [] STEMI      [] + Stress test  [] ACS      [] + EKG Changes  [] Non Q MI       [] Significant Risk Factors  [] Recurrent Angina             [] Diabetes Mellitus    [] New LBBB      [] Uncontrolled HTN. [x] CHF / Low EF changes     [] Abnormal CTA / Ca Score      Procedure:  Access:  [x] Femoral  [] Radial  artery       [] Right  [x] Left    Procedure: After informed consent was obtained with explanation of the risks and benefits, patient was brought to the cath lab. The access area was prepped and draped in sterile fashion. 1% lidocaine was used for local block. The artery was cannulated with 6  Fr sheath with brisk arterial blood return. The side port was frequently flushed and aspirated with normal saline. Findings:    LMCA: Mild irregularities 10-20%.     LAD: Mild irregularities 10-20%.     LCx: Mild irregularities 10-20%.     RCA: Mild irregularities 10-20%.     Coronary Tree      Dominance: Right    Estimated Blood Loss: 10 mL    Conclusions:      Mild cAD. Recommendations:      Medical therapy for CAD and dilated cardiomyopathy.    Reassess EF in 3 months.         ____________________________________________________________________    History and Risk Factors    [x] Hypertension     [] Family history of CAD  [] Hyperlipidemia     [] Cerebrovascular Disease   [] Prior MI       [] Peripheral Vascular disease   [] Prior PCI              [] Diabetes Mellitus    [] Left Main PCI. [] Currently on Dialysis. [] Prior CABG. [] Currently smoker. [] Cardiac Arrest outside of healthcare facility. [] Yes    [x] No        Witnessed     [] Yes   [] No     Arrest after arrival of EMS  [] Yes   [] No     [] Cardiac Arrest at other Facility. [] Yes   [] No    Pre-Procedure Information. Heart Failure       [x] Yes    [] No        Class  [] I      [x] II  [] III    [] IV. New Diagnosis    [x] Yes  [] No    HF Type      [x] Systolic   [] Diastolic          [] Unknown. Diagnostic Test:   EKG       [x] Normal   [] Abnormal    New antiarrhythmia medications:    [] Yes   [] No   New onset atrial fibrillation / Flutter     [] Yes   [] No   ECG Abnormalities:      [] V. Fib   [] Charisma V. Tach           [] NS V. T   [] New LBBB           [] T. Inv  []  ST dev > 0.5 mm         [] PVC's freq  [] PVC's infrequent    Stress Test Performed:      [] Yes    [x] No     Type:     [] Stress Echo   [] Exercise Stress Test (no imaging)      [] Stress Nuclear  [] Stress Imaging     Results   [] Negative   [] Positive        [] Indeterminate  [] Unavailable     If Positive/ Risk / Extent of Ischemia:       [] Low  [] Intermediate         [] High  [] Unavailable      Cardiac CTA Performed:     [] Yes    [x] No      Results   [] CAD   [] Non obstructive CAD      [] No CAD   [] Uncertain      [] Unknown   [] Structural Disease. Pre Procedure Medications:   [] Yes    [x] No         [] ASA   [] Beta Blockers      [] Nitrate   [] Ca Channel Blockers      [] Ranolazine   [] Statin       [] Plavix/Others antiplatelets      Electronically signed on 10/26/2020 at 12:46 PM by:    Jason Barnett MD  Fellow, [address] George Mckinley MD. Attending Physician. [facility].

## 2020-10-27 ENCOUNTER — APPOINTMENT (OUTPATIENT)
Dept: GENERAL RADIOLOGY | Age: 75
DRG: 870 | End: 2020-10-27
Attending: INTERNAL MEDICINE
Payer: MEDICARE

## 2020-10-27 LAB
ABSOLUTE EOS #: 0 K/UL (ref 0–0.44)
ABSOLUTE IMMATURE GRANULOCYTE: 0.19 K/UL (ref 0–0.3)
ABSOLUTE LYMPH #: 0.58 K/UL (ref 1.1–3.7)
ABSOLUTE MONO #: 1.36 K/UL (ref 0.1–1.2)
ALBUMIN SERPL-MCNC: 2.9 G/DL (ref 3.5–5.2)
ALBUMIN/GLOBULIN RATIO: 1 (ref 1–2.5)
ALLEN TEST: ABNORMAL
ALLEN TEST: ABNORMAL
ALLEN TEST: NEGATIVE
ALLEN TEST: POSITIVE
ALP BLD-CCNC: 87 U/L (ref 35–104)
ALT SERPL-CCNC: 9 U/L (ref 5–33)
ANION GAP SERPL CALCULATED.3IONS-SCNC: 8 MMOL/L (ref 9–17)
AST SERPL-CCNC: 13 U/L
BASOPHILS # BLD: 0 % (ref 0–2)
BASOPHILS ABSOLUTE: 0 K/UL (ref 0–0.2)
BILIRUB SERPL-MCNC: 0.25 MG/DL (ref 0.3–1.2)
BNP INTERPRETATION: ABNORMAL
BUN BLDV-MCNC: 14 MG/DL (ref 8–23)
BUN/CREAT BLD: ABNORMAL (ref 9–20)
CALCIUM SERPL-MCNC: 8.8 MG/DL (ref 8.6–10.4)
CHLORIDE BLD-SCNC: 110 MMOL/L (ref 98–107)
CO2: 27 MMOL/L (ref 20–31)
CREAT SERPL-MCNC: 0.44 MG/DL (ref 0.5–0.9)
CULTURE: ABNORMAL
CULTURE: ABNORMAL
CULTURE: NORMAL
CULTURE: NORMAL
DIFFERENTIAL TYPE: ABNORMAL
DIRECT EXAM: ABNORMAL
EOSINOPHILS RELATIVE PERCENT: 0 % (ref 1–4)
FIO2: 40
GFR AFRICAN AMERICAN: >60 ML/MIN
GFR NON-AFRICAN AMERICAN: >60 ML/MIN
GFR SERPL CREATININE-BSD FRML MDRD: ABNORMAL ML/MIN/{1.73_M2}
GFR SERPL CREATININE-BSD FRML MDRD: ABNORMAL ML/MIN/{1.73_M2}
GLUCOSE BLD-MCNC: 154 MG/DL (ref 70–99)
GLUCOSE BLD-MCNC: 162 MG/DL (ref 74–100)
GLUCOSE BLD-MCNC: 175 MG/DL (ref 74–100)
HCT VFR BLD CALC: 35.1 % (ref 36.3–47.1)
HEMOGLOBIN: 10.9 G/DL (ref 11.9–15.1)
IMMATURE GRANULOCYTES: 2 %
LYMPHOCYTES # BLD: 6 % (ref 24–43)
Lab: ABNORMAL
Lab: NORMAL
Lab: NORMAL
MCH RBC QN AUTO: 30.4 PG (ref 25.2–33.5)
MCHC RBC AUTO-ENTMCNC: 31.1 G/DL (ref 28.4–34.8)
MCV RBC AUTO: 97.8 FL (ref 82.6–102.9)
MODE: ABNORMAL
MONOCYTES # BLD: 14 % (ref 3–12)
MORPHOLOGY: ABNORMAL
NEGATIVE BASE EXCESS, ART: ABNORMAL (ref 0–2)
NRBC AUTOMATED: 0 PER 100 WBC
O2 DEVICE/FLOW/%: ABNORMAL
PARTIAL THROMBOPLASTIN TIME: 56.8 SEC (ref 20.5–30.5)
PARTIAL THROMBOPLASTIN TIME: 57.1 SEC (ref 20.5–30.5)
PATIENT TEMP: ABNORMAL
PDW BLD-RTO: 14.6 % (ref 11.8–14.4)
PLATELET # BLD: 175 K/UL (ref 138–453)
PLATELET ESTIMATE: ABNORMAL
PMV BLD AUTO: 11 FL (ref 8.1–13.5)
POC HCO3: 30.2 MMOL/L (ref 21–28)
POC HCO3: 32.8 MMOL/L (ref 21–28)
POC HCO3: 34.1 MMOL/L (ref 21–28)
POC HCO3: 34.3 MMOL/L (ref 21–28)
POC LACTIC ACID: 1.86 MMOL/L (ref 0.56–1.39)
POC O2 SATURATION: 91 % (ref 94–98)
POC O2 SATURATION: 92 % (ref 94–98)
POC O2 SATURATION: 94 % (ref 94–98)
POC O2 SATURATION: 94 % (ref 94–98)
POC PCO2 TEMP: ABNORMAL MM HG
POC PCO2: 49.7 MM HG (ref 35–48)
POC PCO2: 58.7 MM HG (ref 35–48)
POC PCO2: 62.4 MM HG (ref 35–48)
POC PCO2: 71.2 MM HG (ref 35–48)
POC PH TEMP: ABNORMAL
POC PH: 7.29 (ref 7.35–7.45)
POC PH: 7.34 (ref 7.35–7.45)
POC PH: 7.36 (ref 7.35–7.45)
POC PH: 7.39 (ref 7.35–7.45)
POC PO2 TEMP: ABNORMAL MM HG
POC PO2: 64.6 MM HG (ref 83–108)
POC PO2: 65 MM HG (ref 83–108)
POC PO2: 76.1 MM HG (ref 83–108)
POC PO2: 81.3 MM HG (ref 83–108)
POSITIVE BASE EXCESS, ART: 4 (ref 0–3)
POSITIVE BASE EXCESS, ART: 6 (ref 0–3)
POSITIVE BASE EXCESS, ART: 6 (ref 0–3)
POSITIVE BASE EXCESS, ART: 7 (ref 0–3)
POTASSIUM SERPL-SCNC: 3.5 MMOL/L (ref 3.7–5.3)
POTASSIUM SERPL-SCNC: 3.9 MMOL/L (ref 3.7–5.3)
PRO-BNP: ABNORMAL PG/ML
RBC # BLD: 3.59 M/UL (ref 3.95–5.11)
RBC # BLD: ABNORMAL 10*6/UL
SAMPLE SITE: ABNORMAL
SEG NEUTROPHILS: 78 % (ref 36–65)
SEGMENTED NEUTROPHILS ABSOLUTE COUNT: 7.57 K/UL (ref 1.5–8.1)
SODIUM BLD-SCNC: 143 MMOL/L (ref 135–144)
SODIUM BLD-SCNC: 145 MMOL/L (ref 135–144)
SPECIMEN DESCRIPTION: ABNORMAL
SPECIMEN DESCRIPTION: NORMAL
SPECIMEN DESCRIPTION: NORMAL
TCO2 (CALC), ART: 32 MMOL/L (ref 22–29)
TCO2 (CALC), ART: 35 MMOL/L (ref 22–29)
TCO2 (CALC), ART: 36 MMOL/L (ref 22–29)
TCO2 (CALC), ART: 37 MMOL/L (ref 22–29)
TOTAL PROTEIN: 5.9 G/DL (ref 6.4–8.3)
WBC # BLD: 9.7 K/UL (ref 3.5–11.3)
WBC # BLD: ABNORMAL 10*3/UL

## 2020-10-27 PROCEDURE — 2500000003 HC RX 250 WO HCPCS: Performed by: STUDENT IN AN ORGANIZED HEALTH CARE EDUCATION/TRAINING PROGRAM

## 2020-10-27 PROCEDURE — 83880 ASSAY OF NATRIURETIC PEPTIDE: CPT

## 2020-10-27 PROCEDURE — 6360000002 HC RX W HCPCS: Performed by: STUDENT IN AN ORGANIZED HEALTH CARE EDUCATION/TRAINING PROGRAM

## 2020-10-27 PROCEDURE — 2700000000 HC OXYGEN THERAPY PER DAY

## 2020-10-27 PROCEDURE — 6370000000 HC RX 637 (ALT 250 FOR IP): Performed by: STUDENT IN AN ORGANIZED HEALTH CARE EDUCATION/TRAINING PROGRAM

## 2020-10-27 PROCEDURE — 99233 SBSQ HOSP IP/OBS HIGH 50: CPT | Performed by: INTERNAL MEDICINE

## 2020-10-27 PROCEDURE — 94770 HC ETCO2 MONITOR DAILY: CPT

## 2020-10-27 PROCEDURE — 37799 UNLISTED PX VASCULAR SURGERY: CPT

## 2020-10-27 PROCEDURE — 84132 ASSAY OF SERUM POTASSIUM: CPT

## 2020-10-27 PROCEDURE — 94640 AIRWAY INHALATION TREATMENT: CPT

## 2020-10-27 PROCEDURE — 6360000002 HC RX W HCPCS

## 2020-10-27 PROCEDURE — 94003 VENT MGMT INPAT SUBQ DAY: CPT

## 2020-10-27 PROCEDURE — 80053 COMPREHEN METABOLIC PANEL: CPT

## 2020-10-27 PROCEDURE — 2580000003 HC RX 258: Performed by: STUDENT IN AN ORGANIZED HEALTH CARE EDUCATION/TRAINING PROGRAM

## 2020-10-27 PROCEDURE — 85730 THROMBOPLASTIN TIME PARTIAL: CPT

## 2020-10-27 PROCEDURE — 84295 ASSAY OF SERUM SODIUM: CPT

## 2020-10-27 PROCEDURE — 82803 BLOOD GASES ANY COMBINATION: CPT

## 2020-10-27 PROCEDURE — 85025 COMPLETE CBC W/AUTO DIFF WBC: CPT

## 2020-10-27 PROCEDURE — 94761 N-INVAS EAR/PLS OXIMETRY MLT: CPT

## 2020-10-27 PROCEDURE — 82947 ASSAY GLUCOSE BLOOD QUANT: CPT

## 2020-10-27 PROCEDURE — 99291 CRITICAL CARE FIRST HOUR: CPT | Performed by: INTERNAL MEDICINE

## 2020-10-27 PROCEDURE — 2580000003 HC RX 258

## 2020-10-27 PROCEDURE — 71045 X-RAY EXAM CHEST 1 VIEW: CPT

## 2020-10-27 PROCEDURE — 2000000000 HC ICU R&B

## 2020-10-27 PROCEDURE — 83605 ASSAY OF LACTIC ACID: CPT

## 2020-10-27 RX ORDER — AMIODARONE HYDROCHLORIDE 200 MG/1
200 TABLET ORAL DAILY
Status: DISCONTINUED | OUTPATIENT
Start: 2020-11-01 | End: 2020-10-28

## 2020-10-27 RX ORDER — FUROSEMIDE 10 MG/ML
40 INJECTION INTRAMUSCULAR; INTRAVENOUS ONCE
Status: COMPLETED | OUTPATIENT
Start: 2020-10-27 | End: 2020-10-27

## 2020-10-27 RX ORDER — VECURONIUM BROMIDE 1 MG/ML
10 INJECTION, POWDER, LYOPHILIZED, FOR SOLUTION INTRAVENOUS ONCE
Status: COMPLETED | OUTPATIENT
Start: 2020-10-27 | End: 2020-10-27

## 2020-10-27 RX ORDER — LORAZEPAM 2 MG/ML
INJECTION INTRAMUSCULAR
Status: COMPLETED
Start: 2020-10-27 | End: 2020-10-27

## 2020-10-27 RX ORDER — AMIODARONE HYDROCHLORIDE 200 MG/1
200 TABLET ORAL 2 TIMES DAILY
Status: DISCONTINUED | OUTPATIENT
Start: 2020-10-27 | End: 2020-10-28

## 2020-10-27 RX ADMIN — FUROSEMIDE 40 MG: 10 INJECTION, SOLUTION INTRAMUSCULAR; INTRAVENOUS at 14:20

## 2020-10-27 RX ADMIN — Medication 10 ML: at 05:07

## 2020-10-27 RX ADMIN — VECURONIUM BROMIDE 10 MG: 1 INJECTION, POWDER, LYOPHILIZED, FOR SOLUTION INTRAVENOUS at 04:54

## 2020-10-27 RX ADMIN — PREGABALIN 300 MG: 100 CAPSULE ORAL at 08:10

## 2020-10-27 RX ADMIN — IPRATROPIUM BROMIDE AND ALBUTEROL SULFATE 1 AMPULE: .5; 3 SOLUTION RESPIRATORY (INHALATION) at 13:37

## 2020-10-27 RX ADMIN — LEVOTHYROXINE SODIUM 150 MCG: 75 TABLET ORAL at 08:10

## 2020-10-27 RX ADMIN — HEPARIN SODIUM 30 UNITS/KG/HR: 10000 INJECTION, SOLUTION INTRAVENOUS at 08:27

## 2020-10-27 RX ADMIN — WATER 10 ML: 1 INJECTION INTRAMUSCULAR; INTRAVENOUS; SUBCUTANEOUS at 05:07

## 2020-10-27 RX ADMIN — AMIODARONE HYDROCHLORIDE 200 MG: 200 TABLET ORAL at 14:20

## 2020-10-27 RX ADMIN — IPRATROPIUM BROMIDE AND ALBUTEROL SULFATE 1 AMPULE: .5; 3 SOLUTION RESPIRATORY (INHALATION) at 19:52

## 2020-10-27 RX ADMIN — ASPIRIN 81 MG: 81 TABLET, CHEWABLE ORAL at 08:11

## 2020-10-27 RX ADMIN — AZITHROMYCIN MONOHYDRATE 500 MG: 500 INJECTION, POWDER, LYOPHILIZED, FOR SOLUTION INTRAVENOUS at 08:26

## 2020-10-27 RX ADMIN — HYDROCORTISONE SODIUM SUCCINATE 50 MG: 100 INJECTION, POWDER, FOR SOLUTION INTRAMUSCULAR; INTRAVENOUS at 00:29

## 2020-10-27 RX ADMIN — ENOXAPARIN SODIUM 70 MG: 80 INJECTION SUBCUTANEOUS at 21:00

## 2020-10-27 RX ADMIN — IPRATROPIUM BROMIDE AND ALBUTEROL SULFATE 1 AMPULE: .5; 3 SOLUTION RESPIRATORY (INHALATION) at 07:51

## 2020-10-27 RX ADMIN — SODIUM CHLORIDE, PRESERVATIVE FREE 10 ML: 5 INJECTION INTRAVENOUS at 21:00

## 2020-10-27 RX ADMIN — LORAZEPAM 4 MG: 2 INJECTION INTRAMUSCULAR at 00:30

## 2020-10-27 RX ADMIN — CISATRACURIUM BESYLATE 5 MCG/KG/MIN: 200 INJECTION INTRAVENOUS at 21:00

## 2020-10-27 RX ADMIN — PREGABALIN 300 MG: 100 CAPSULE ORAL at 21:30

## 2020-10-27 RX ADMIN — FAMOTIDINE 20 MG: 10 INJECTION INTRAVENOUS at 08:10

## 2020-10-27 RX ADMIN — POTASSIUM CHLORIDE, DEXTROSE MONOHYDRATE AND SODIUM CHLORIDE: 150; 5; 900 INJECTION, SOLUTION INTRAVENOUS at 00:05

## 2020-10-27 RX ADMIN — SODIUM CHLORIDE, PRESERVATIVE FREE 10 ML: 5 INJECTION INTRAVENOUS at 08:12

## 2020-10-27 RX ADMIN — LORAZEPAM 4 MG: 2 INJECTION INTRAMUSCULAR; INTRAVENOUS at 00:30

## 2020-10-27 RX ADMIN — Medication 200 MCG/HR: at 10:10

## 2020-10-27 RX ADMIN — Medication 200 MCG/HR: at 20:20

## 2020-10-27 RX ADMIN — IPRATROPIUM BROMIDE AND ALBUTEROL SULFATE 1 AMPULE: .5; 3 SOLUTION RESPIRATORY (INHALATION) at 03:37

## 2020-10-27 RX ADMIN — POTASSIUM CHLORIDE, DEXTROSE MONOHYDRATE AND SODIUM CHLORIDE: 150; 5; 900 INJECTION, SOLUTION INTRAVENOUS at 18:08

## 2020-10-27 RX ADMIN — HYDROCORTISONE SODIUM SUCCINATE 50 MG: 100 INJECTION, POWDER, FOR SOLUTION INTRAMUSCULAR; INTRAVENOUS at 08:10

## 2020-10-27 RX ADMIN — HYDROCORTISONE SODIUM SUCCINATE 50 MG: 100 INJECTION, POWDER, FOR SOLUTION INTRAMUSCULAR; INTRAVENOUS at 16:30

## 2020-10-27 RX ADMIN — AMIODARONE HYDROCHLORIDE 200 MG: 200 TABLET ORAL at 21:30

## 2020-10-27 RX ADMIN — CEFTRIAXONE SODIUM 1 G: 1 INJECTION, POWDER, FOR SOLUTION INTRAMUSCULAR; INTRAVENOUS at 08:11

## 2020-10-27 RX ADMIN — AMIODARONE HYDROCHLORIDE 0.5 MG/MIN: 50 INJECTION, SOLUTION INTRAVENOUS at 11:40

## 2020-10-27 RX ADMIN — POTASSIUM BICARBONATE 40 MEQ: 782 TABLET, EFFERVESCENT ORAL at 08:10

## 2020-10-27 RX ADMIN — CISATRACURIUM BESYLATE 0.5 MCG/KG/MIN: 200 INJECTION INTRAVENOUS at 02:29

## 2020-10-27 RX ADMIN — Medication 2 MG/HR: at 01:03

## 2020-10-27 RX ADMIN — Medication 175 MCG/HR: at 14:52

## 2020-10-27 RX ADMIN — Medication 7 MG/HR: at 11:19

## 2020-10-27 RX ADMIN — FAMOTIDINE 20 MG: 10 INJECTION INTRAVENOUS at 21:00

## 2020-10-27 ASSESSMENT — PAIN SCALES - GENERAL
PAINLEVEL_OUTOF10: 3
PAINLEVEL_OUTOF10: 0

## 2020-10-27 ASSESSMENT — PULMONARY FUNCTION TESTS
PIF_VALUE: 28
PIF_VALUE: 30
PIF_VALUE: 30
PIF_VALUE: 9
PIF_VALUE: 30
PIF_VALUE: 30
PIF_VALUE: 14

## 2020-10-27 NOTE — PROGRESS NOTES
[facility]   Progress Note                   Date:   10/27/2020  Patient name: Tova Wilde  Date of admission:  10/22/2020  6:58 PM  MRN:   [id number]  YOB: 1945  PCP: Stalin Carrizales MD    Reason for Admission:     Subjective:     Sputum is growing lactose fermenting gram-negative rods    Febrile with T-max of 100.2  Intubated,  Heart rate is in tachycardia with 105  Blood pressure with systolic in 330F and diastolic in 42S      Intake/Output Summary (Last 24 hours) at 10/27/2020 0930  Last data filed at 10/27/2020 0749  Gross per 24 hour   Intake 4684.84 ml   Output 2040 ml   Net 2644.84 ml       Medications:   Scheduled Meds:   potassium bicarb-citric acid  40 mEq Oral Daily    sodium chloride flush  10 mL Intravenous 2 times per day    aspirin  81 mg Oral Daily    hydrocortisone sodium succinate PF  50 mg Intravenous Q8H    ipratropium-albuterol  1 ampule Inhalation Q6H    famotidine (PEPCID) injection  20 mg Intravenous BID    anesthetic and narcotic custom mixture   Intrathecal Once    azithromycin  500 mg Intravenous Q24H    cefTRIAXone (ROCEPHIN) IV  1 g Intravenous Q24H    levothyroxine  150 mcg Oral Daily    pregabalin  300 mg Oral BID    sodium chloride flush  10 mL Intravenous 2 times per day     Continuous Infusions:   dextrose 5% and 0.9% NaCl with KCl 20 mEq 50 mL/hr at 10/27/20 0005    propofol Stopped (10/27/20 0240)    phenylephrine (TOMÁS-SYNEPHRINE) 50mg/250mL infusion Stopped (10/27/20 0452)    norepinephrine Stopped (10/24/20 0644)    heparin (PORCINE) Infusion 30 Units/kg/hr (10/27/20 0827)    fentaNYL 200 mcg/hr (10/27/20 0100)    cisatracurium (NIMBEX) infusion 2.5 mcg/kg/min (10/27/20 0858)    midazolam 7 mg/hr (10/27/20 0335)    vasopressin (Septic Shock) infusion Stopped (10/25/20 0400)    amiodarone 0.5 mg/min (10/26/20 1931)    propofol 30 mcg/kg/min (10/26/20 0944)     CBC:   Recent Labs     10/25/20  0534 10/26/20  2093 10/27/20  0455   WBC 13.7* 14.2* 9.7   HGB 10.5* 11.3* 10.9*    201 175     BMP:    Recent Labs     10/25/20  0534 10/26/20  0429 10/26/20  1901 10/27/20  0455   * 146* 150* 145*   K 3.2* 3.2*  --  3.5*   CL 99 109*  --  110*   CO2 25 26  --  27   BUN 16 15  --  14   CREATININE 0.40* 0.41*  --  0.44*   GLUCOSE 135* 142*  --  154*     Hepatic:   Recent Labs     10/25/20  0534 10/26/20  0429 10/27/20  0455   AST 13 13 13   ALT 9 9 9   BILITOT 0.38 0.36 0.25*   ALKPHOS 66 97 87     Troponin: No results for input(s): TROPONINI in the last 72 hours. BNP: No results for input(s): BNP in the last 72 hours. Lipids: No results for input(s): CHOL, HDL in the last 72 hours. Invalid input(s): LDLCALCU  INR: No results for input(s): INR in the last 72 hours. Objective:   Vitals: BP (!) 144/71   Pulse 99   Temp 99 °F (37.2 °C) (Oral)   Resp 18   Ht 5' 4\" (1.626 m)   Wt 164 lb (74.4 kg)   SpO2 93%   BMI 28.15 kg/m²   Constitutional and General Appearance: alert, cooperative, no distress and appears stated age    Respiratory:  · Clear to auscultation bilaterally, with equal air entry  Cardiovascular:  · S1, s2, rrr, no pain on palpation of anterior chest wall. Abdomen:   · No masses or tenderness, soft abdomen  · Bowel sounds present  Extremities:  · No le edema, peripheral pulse bl le present 2 +  Integumentum:   Intact with no rashes noted      EKG:         Atrial flutter with variable A-V block  Inferior infarct (cited on or before 23-OCT-2020)  Anterior infarct (cited on or before 22-OCT-2020)  T wave abnormality, consider lateral ischemia  Abnormal ECG  When compared with ECG of 23-OCT-2020 19:46,  Atrial flutter has replaced Atrial fibrillation  Nonspecific T wave abnormality now evident in Inferior leads  T wave inversion now evident in Anterior leads        ECHO:10/22  Left ventricle is normal in size. Global left ventricular systolic function  is severely reduced.  Estimated ejection fraction is 25-30 % . Calculated EF via heart model is 30 %. The apex appears akinetic. Cannot exclude an apical thrombus, consider  Definity. Mild mitral regurgitation. Mild to moderate tricuspid regurgitation. Estimated right ventricular  systolic pressure is 35 mmHg. IVC dilated but unable to assess respiratory collapse due to patient on  ventilator. Cardiac Angiography:10/26       LMCA: Mild irregularities 10-20%.     LAD: Mild irregularities 10-20%.     LCx: Mild irregularities 10-20%.     RCA: Mild irregularities 10-20%.     Coronary Tree      Dominance: Right     Estimated Blood Loss: 10 mL     Conclusions:      Mild cAD.        Recommendations:      Medical therapy for CAD and dilated cardiomyopathy.   Reassess EF in 3 months      Assessment / Acute Cardiac Problems:   1. Acute hypoxic respiratory failure possibly secondary to bilateral lobe pneumonia, possibly secondary to pulmonary edema  2. Community-acquired pneumonia secondary to lactose fermenting gram-negative rods  3. Possible cardiogenic shock  4. New onset systolic dysfunction with EF of 25 to 30% (Takotsubo cardiomyopathy)  5. New onset A. fib/atrial flutter with RVR now with normal sinus rhythm  6. Rheumatoid arthritis on Plaquenil  7. No cardiac evaluation in the past  8. NSTEMI    Plan of Treatment:   1. Chest x-ray noted to have worsening of infiltrates  2. Give 1 dose of IV Lasix 40 mg  3. Stop amiodarone drip and change to p.o. amiodarone 200 mg twice daily for 5 days followed by 200 mg once daily  4. Continue heparin drip for A. fib, changed to p.o. if ok with primary. 5. Reassess EF in 3 months for possible AICD if EF is low  6. Life vest on discharge  7. We will follow    Will discuss with rounding attending Dr. Lindsay Dupree  for final recommendations.      Sonu Noriega MD,  Internal Medicine Resident, PGY-3,   [address].  10/27/2020,9:30 AM  Attending Physician Statement  I have discussed the care of the patient, including pertinent history and exam findings, with the resident. I have seen and examined the patient and the key elements of all parts of the encounter have been performed by me. I agree with the assessment, plan and orders as documented by the resident.     Ashley Ortiz MD

## 2020-10-27 NOTE — PROGRESS NOTES
[facility] - Progress Note    Today's Date and Time: 10/27/2020, 8:17 AM    Impression :   · Septic Shock   · bandemia  · CHF with elevated ProBNP  · Pulmonary edema  · Superimposed pneumonia; sputum cultures 10/25/20 grew Klebsiella Pneumoniae moderate growth  · COPD  · Elevated Troponins  · Hypothyroidism    Recommendations:   · Rocephin 2 gm IV q 24 hr (start date 10/23)  · D/C Zithromax 500 mg IV q 24 hr (start date 10/23. Stop date 10-27-20)  Medical Decision Making/Summary/Discussion:   · Patient with CHF  · Rapid deterioration with acute hypoxia and hypercarbia requiring intubation  · CXR with rapid fluid shifts suggesting pulmonary edema. Very high ProBNP  · Can not exclude associated pneumonia at this stage. Pro calcitonin elevated  Infection Control Recommendations   · [place] Precautions    Antimicrobial Stewardship Recommendations     · Simplification of therapy  · Targeted therapy  Coordination of Outpatient Care:   · Estimated Length of IV antimicrobials:TBD  · Patient will need Midline Catheter Insertion: No  · Patient will need PICC line Insertion:Yes  · Patient will need: Home IV , [place],  SNF,  LTAC: TBD  · Patient will need outpatient wound care:No    Chief complaint/reason for consultation:   · Sepsis secondary to pneumonia      History of Present Illness:   Chriss Escobar is a 77y.o.-year-old  female who was initially admitted on 10/22/2020. Patient seen at the request of Dr. Iona Cole:    History was obtained from chart review and unobtainable from patient due to mental status. She has a history of hypothyroidism, COPD, iron deficiency anemia, presented to [facility] emergency department on 10/22 for difficulties breathing. Found to have pneumonia, and started on the sepsis protocol. At [facility]. V's, she is unable to provide much history as she is somnolent, but does follow commands.  per EMR review became on the morning of 10/22, prompting her emergency department visit. There they performed a chest x-ray as well as CT scan which was negative for any pulmonary embolism but did show bilateral infiltrates. Found to have a lactic acidosis as well, and started on Rocephin and azithromycin. Initially hypercapnic, her mentation improved on BiPAP on 10/22. CURRENT EVALUATION: 10/27/20     Patient evaluated and examined in the ICU. Low grade fever, T max 100.2F. Tachycardic pulse 100s to 110s. Patient off of levophed. Sedated, on ventilator. Patient examined at bedside in ICU, not alert, awake, not following commands, patient had to be re-paralyzed yesterday night. On Amiodarone. CXR 10/24 increased bilat infiltrates  CXR 10/26 shows bilateral upper and lower lobe infiltrates unchanged from previous x-ray. CXR 10-27-20: shows coarse diffuse bilateral infiltrates with superimposed haziness, worse than yesterday. Patient shows fluid shifts. NSTEMI, on heparin drip, new onset reduced systolic dysfunction, EF 60%, apical hypokinesis. Cardiac cath done yesterday. Mild CAD. Recommend medical therapy for CAD and dilated cardiomyopathy. Labs:    Lactic acid 6.2 > 2.9 > 2.0>1.7    Creatinine: 0.56>0.44  BUN: 17 > 16 > 15>14    WBC: 9.4 > 16.8 > 13.7 >14.2>9.7  Hgb: 12 >10.5 >11.3>10.9  Platelet: 190 >971 >499>836    Cultures:  Urine:  ·   Blood:  · 10/22/20: No growth   Sputum :  · 10/25/20: Klebsiella pneumoniae moderate growth. Wound:     CSF         Discussed with patient, RN,     I have personally reviewed the past medical history, past surgical history, medications, social history, and family history, and I have updated the database accordingly.   Past Medical History:     Past Medical History:   Diagnosis Date    COPD (chronic obstructive pulmonary disease) ([facility] [address]     Depression     GERD (gastroesophageal reflux disease)     Osteoporosis        Past Surgical  History:     Past Surgical History: Procedure Laterality Date    BACK SURGERY      BREAST SURGERY      CARPAL TUNNEL RELEASE      FRACTURE SURGERY Left 2018    ORIF wrist    HYSTERECTOMY      JOINT REPLACEMENT      right knee    JOINT REPLACEMENT      WRIST FRACTURE SURGERY Left 2018    WRIST OPEN REDUCTION INTERNAL FIXATION-DISTAL RADIUS performed by Giovana Hernandez MD at [address]       Medications:      potassium bicarb-citric acid  40 mEq Oral Daily    sodium chloride flush  10 mL Intravenous 2 times per day    aspirin  81 mg Oral Daily    hydrocortisone sodium succinate PF  50 mg Intravenous Q8H    ipratropium-albuterol  1 ampule Inhalation Q6H    famotidine (PEPCID) injection  20 mg Intravenous BID    anesthetic and narcotic custom mixture   Intrathecal Once    azithromycin  500 mg Intravenous Q24H    cefTRIAXone (ROCEPHIN) IV  1 g Intravenous Q24H    levothyroxine  150 mcg Oral Daily    pregabalin  300 mg Oral BID    sodium chloride flush  10 mL Intravenous 2 times per day       Social History:     Social History     Socioeconomic History    Marital status:      Spouse name: Not on file    Number of children: Not on file    Years of education: Not on file    Highest education level: Not on file   Occupational History    Not on file   Social Needs    Financial resource strain: Not on file    Food insecurity     Worry: Not on file     Inability: Not on file    Transportation needs     Medical: Not on file     Non-medical: Not on file   Tobacco Use    Smoking status: Former Smoker     Last attempt to quit: 1976     Years since quittin.9    Smokeless tobacco: Never Used   Substance and Sexual Activity    Alcohol use: No    Drug use: No    Sexual activity: Not on file   Lifestyle    Physical activity     Days per week: Not on file     Minutes per session: Not on file    Stress: Not on file   Relationships    Social connections     Talks on phone: Not on file     Gets together: Not on file     Attends [employer] service: Not on file     Active member of club or organization: Not on file     Attends meetings of clubs or organizations: Not on file     Relationship status: Not on file    Intimate partner violence     Fear of current or ex partner: Not on file     Emotionally abused: Not on file     Physically abused: Not on file     Forced sexual activity: Not on file   Other Topics Concern    Not on file   Social History Narrative    Not on file       Family History:   No family history on file. Allergies:   Patient has no known allergies.      Review of Systems:   Review of Systems   Unable to perform ROS: Intubated          Physical Examination :     Patient Vitals for the past 8 hrs:   BP Temp Temp src Pulse Resp SpO2 Weight   10/27/20 0751 -- -- -- -- 18 93 % --   10/27/20 0700 (!) 144/71 -- -- 103 18 93 % --   10/27/20 0645 -- -- -- 104 18 93 % --   10/27/20 0630 -- -- -- 106 18 93 % --   10/27/20 0615 -- -- -- 107 18 93 % --   10/27/20 0600 (!) 154/74 -- -- 110 18 93 % --   10/27/20 0545 -- -- -- 110 18 93 % --   10/27/20 0530 -- -- -- 113 18 92 % --   10/27/20 0515 -- -- -- 115 18 91 % --   10/27/20 0500 (!) 148/73 -- -- 115 19 90 % --   10/27/20 0445 -- -- -- 114 18 91 % --   10/27/20 0430 -- -- -- 115 21 94 % --   10/27/20 0415 -- -- -- 117 (!) 32 94 % --   10/27/20 0400 (!) 160/75 99 °F (37.2 °C) Oral 114 22 93 % 164 lb (74.4 kg)   10/27/20 0345 -- -- -- 112 20 93 % --   10/27/20 0338 -- -- -- 109 25 94 % --   10/27/20 0330 -- -- -- 112 19 94 % --   10/27/20 0315 -- -- -- 111 18 94 % --   10/27/20 0300 137/70 -- -- 113 18 95 % --   10/27/20 0245 -- -- -- 114 18 93 % --   10/27/20 0230 -- -- -- 116 18 93 % --   10/27/20 0215 -- -- -- 118 18 93 % --   10/27/20 0200 135/66 -- -- 119 18 93 % --   10/27/20 0145 -- -- -- 122 19 92 % --   10/27/20 0130 -- -- -- 122 19 93 % --   10/27/20 0115 -- -- -- 124 19 92 % --   10/27/20 0100 134/77 -- -- 126 21 92 % --   10/27/20 0045 -- -- -- 129 20 91 % --   10/27/20 0030 -- -- -- 132 21 98 % --     Physical Exam  Constitutional:       General: She is not in acute distress. Appearance: Normal appearance. She is obese. She is ill-appearing. HENT:      Head: Normocephalic and atraumatic. Nose: Nose normal. No congestion. Mouth/Throat:      Mouth: Mucous membranes are moist.   Eyes:      General: No scleral icterus. Conjunctiva/sclera: Conjunctivae normal.   Neck:      Musculoskeletal: Neck supple. No neck rigidity. Cardiovascular:      Rate and Rhythm: Normal rate and regular rhythm. Heart sounds: Normal heart sounds. No murmur. Pulmonary:      Effort: No respiratory distress. Breath sounds: Rales present. Abdominal:      General: There is no distension. Palpations: Abdomen is soft. Tenderness: There is no abdominal tenderness. Genitourinary:     Comments: Urine maral  R fem line in good condition  Musculoskeletal:         General: No swelling or tenderness. Skin:     Coloration: Skin is not jaundiced or pale. Neurological:      Comments: Sedated    Psychiatric:      Comments: Calm on the vent sedated          Medical Decision Making -Laboratory:   I have independently reviewed/ordered the following labs:    CBC with Differential:   Recent Labs     10/26/20  0429 10/27/20  0455   WBC 14.2* 9.7   HGB 11.3* 10.9*   HCT 36.0* 35.1*    175   LYMPHOPCT 7* 6*   MONOPCT 8 14*     BMP:   Recent Labs     10/26/20  0429 10/26/20  1901 10/27/20  0455   * 150* 145*   K 3.2*  --  3.5*   *  --  110*   CO2 26  --  27   BUN 15  --  14   CREATININE 0.41*  --  0.44*     Hepatic Function Panel:   Recent Labs     10/26/20  0429 10/27/20  0455   PROT 6.1* 5.9*   LABALBU 2.8* 2.9*   BILITOT 0.36 0.25*   ALKPHOS 97 87   ALT 9 9   AST 13 13     No results for input(s): RPR in the last 72 hours. No results for input(s): HIV in the last 72 hours. No results for input(s): BC in the last 72 hours.   Lab Results

## 2020-10-27 NOTE — PLAN OF CARE
Problem: OXYGENATION/RESPIRATORY FUNCTION  Goal: Patient will maintain patent airway  10/27/2020 0100 by Kerry Golden RCP  Outcome: Ongoing     Problem: OXYGENATION/RESPIRATORY FUNCTION  Goal: Patient will achieve/maintain normal respiratory rate/effort  Description: Respiratory rate and effort will be within normal limits for the patient  10/27/2020 0100 by Kerry Golden RCP  Outcome: Ongoing     Problem: MECHANICAL VENTILATION  Goal: Patient will maintain patent airway  10/27/2020 0100 by Kerry Golden RCP  Outcome: Ongoing     Problem: MECHANICAL VENTILATION  Goal: Oral health is maintained or improved  10/27/2020 0100 by Kerry Golden RCP  Outcome: Ongoing     Problem: MECHANICAL VENTILATION  Goal: ET tube will be managed safely  10/27/2020 0100 by Kerry Golden RCP  Outcome: Ongoing     Problem: MECHANICAL VENTILATION  Goal: Ability to express needs and understand communication  10/27/2020 0100 by Kerry Golden RCP  Outcome: Ongoing     Problem: MECHANICAL VENTILATION  Goal: Mobility/activity is maintained at optimum level for patient  10/27/2020 0100 by Kerry Golden RCP  Outcome: Ongoing     Problem: SKIN INTEGRITY  Goal: Skin integrity is maintained or improved  10/27/2020 0100 by Kerry Golden RCP  Outcome: Ongoing

## 2020-10-27 NOTE — PLAN OF CARE
Problem: OXYGENATION/RESPIRATORY FUNCTION  Goal: Patient will maintain patent airway  10/27/2020 0849 by Lupe Mar RCP  Outcome: Ongoing     Problem: MECHANICAL VENTILATION  Goal: Patient will maintain patent airway  10/27/2020 0849 by Lupe Mar RCP  Outcome: Ongoing     Problem: MECHANICAL VENTILATION  Goal: Oral health is maintained or improved  10/27/2020 0849 by Lupe Mar RCP  Outcome: Ongoing     Problem: MECHANICAL VENTILATION  Goal: ET tube will be managed safely  10/27/2020 0849 by Lupe Mar RCP  Outcome: Ongoing

## 2020-10-27 NOTE — PROGRESS NOTES
Critical Care Team - Daily Progress Note      Date and time: 10/27/2020 10:12 AM  Patient's name:  Jer Minor Record Number: [id number]  Patient's account/billing number: [de-identified]  Patient's YOB: 1945  Age: 77 y.o. Date of Admission: 10/22/2020  6:58 PM  Length of stay during current admission: 5      Primary Care Physician: Lynnette Smith MD  ICU Attending Physician: Dr. Laxmi Gold    Code Status: Full Code    Reason for ICU admission: sepsis, fluid overload       SUBJECTIVE:     OVERNIGHT EVENTS:     Patient was seen and examined at bedside. She underwent cardiac cath yesterday that revealed mild CAD. The patient has dilated CM, being medically managed. Overnight, the patient was febrile with a Tmax 100.2 F. Due to concern for worsening respiratory status and vent dyssynchrony, the patient was paralyzed and started on nimbex, she is currently on 2.5 nimbex and versed 7 and 200 mcg fentanyl for sedation. She is currently off pressor support. CXR this am shows worsening opacities concerning for worsening infection vs edema. PRVC 18/480/8/40. PH 7.35/58.7/65/32. 8. She continues to be on tube feeds. Labs reviewed, Na 145, K 3.5 replaced, renal function WNL. Hb 11.3--10.9, 15,1 on admission. She continued to be on the heparin drip and amiodarone drip. Respiratory cultures positive for Klebsiella pneumoniae. I/O - 5.2/1.9, net 6L positive.      AWAKE & FOLLOWING COMMANDS:  [x] No   [] Yes    CURRENT VENTILATION STATUS:     [x] Ventilator  [] BIPAP  [] Nasal Cannula [] Room Air      IF INTUBATED, ET TUBE MARKING AT LOWER LIP:       cms    SECRETIONS Amount:  [x] Small [] Moderate  [] Large  [] None  Color:     [] White [] Colored  [] Bloody    SEDATION:  RAAS Score:  [x] Fentanyl gtt  [x] Versed gtt  [] Ativan gtt   [] No Sedation    PARALYZED:  [] No    [x] Yes    DIARRHEA:                [x] No                [] Yes  (C. Difficile status: [] positive [] negative                                                                                                                     [] pending)    VASOPRESSORS:  [x] No    [] Yes    If yes -   [] Levophed       [] Dopamine     [] Vasopressin       [] Dobutamine  [] Phenylephrine         [] Epinephrine    CENTRAL LINES:     [] No   [x] Yes   (Date of Insertion:10/23/20   )           If yes -     [] Right IJ     [] Left IJ [x] Right Femoral [] Left Femoral                   [] Right Subclavian [] Left Subclavian       LUNA'S CATHETER:   [] No   [x] Yes  (Date of Insertion:  10/22/20 )     URINE OUTPUT:            [x] Good   [] Low              [] Anuric      OBJECTIVE:     VITAL SIGNS:  BP (!) 144/71   Pulse 99   Temp 99 °F (37.2 °C) (Oral)   Resp 18   Ht 5' 4\" (1.626 m)   Wt 164 lb (74.4 kg)   SpO2 93%   BMI 28.15 kg/m²     Tmax over 24 hours:  Temp (24hrs), Av.8 °F (37.1 °C), Min:98.2 °F (36.8 °C), Max:100.2 °F (37.9 °C)      Patient Vitals for the past 6 hrs:   BP Pulse Resp SpO2   10/27/20 0751 -- 99 18 93 %   10/27/20 0700 (!) 144/71 103 18 93 %   10/27/20 0645 -- 104 18 93 %   10/27/20 0630 -- 106 18 93 %   10/27/20 0615 -- 107 18 93 %   10/27/20 0600 (!) 154/74 110 18 93 %   10/27/20 0545 -- 110 18 93 %   10/27/20 0530 -- 113 18 92 %   10/27/20 0515 -- 115 18 91 %   10/27/20 0500 (!) 148/73 115 19 90 %   10/27/20 0445 -- 114 18 91 %   10/27/20 0430 -- 115 21 94 %   10/27/20 0415 -- 117 (!) 32 94 %         Intake/Output Summary (Last 24 hours) at 10/27/2020 1012  Last data filed at 10/27/2020 0749  Gross per 24 hour   Intake 4684.84 ml   Output 1840 ml   Net 2844.84 ml     Wt Readings from Last 2 Encounters:   10/27/20 164 lb (74.4 kg)   10/22/20 164 lb (74.4 kg)     Body mass index is 28.15 kg/m².         PHYSICAL EXAMINATION:  General appearance - intubated, sedated, and paralyzed  Mental status -intubated and sedated  Eyes - PERRL  Chest - clear to auscultation, no wheezes, rales or rhonchi, symmetric air entry  Heart - normal rate and regular rhythm  Abdomen - soft, nontender, nondistended, no masses or organomegaly  Neurological -intubated, sedated, paralyzed  Extremities -no pedal edema noted  Skin -no rashes over exposed skin    MEDICATIONS:    Scheduled Meds:   potassium bicarb-citric acid  40 mEq Oral Daily    sodium chloride flush  10 mL Intravenous 2 times per day    aspirin  81 mg Oral Daily    hydrocortisone sodium succinate PF  50 mg Intravenous Q8H    ipratropium-albuterol  1 ampule Inhalation Q6H    famotidine (PEPCID) injection  20 mg Intravenous BID    anesthetic and narcotic custom mixture   Intrathecal Once    azithromycin  500 mg Intravenous Q24H    cefTRIAXone (ROCEPHIN) IV  1 g Intravenous Q24H    levothyroxine  150 mcg Oral Daily    pregabalin  300 mg Oral BID    sodium chloride flush  10 mL Intravenous 2 times per day     Continuous Infusions:   dextrose 5% and 0.9% NaCl with KCl 20 mEq 50 mL/hr at 10/27/20 0005    propofol Stopped (10/27/20 0240)    phenylephrine (TOMÁS-SYNEPHRINE) 50mg/250mL infusion Stopped (10/27/20 0452)    norepinephrine Stopped (10/24/20 0644)    heparin (PORCINE) Infusion 30 Units/kg/hr (10/27/20 0827)    fentaNYL Stopped (10/27/20 1009)    cisatracurium (NIMBEX) infusion 2.5 mcg/kg/min (10/27/20 0858)    midazolam 7 mg/hr (10/27/20 0335)    vasopressin (Septic Shock) infusion Stopped (10/25/20 0400)    amiodarone 0.5 mg/min (10/26/20 1931)    propofol 30 mcg/kg/min (10/26/20 0944)     PRN Meds:   heparin (porcine), 40 Units/kg, PRN  sodium chloride flush, 10 mL, PRN  acetaminophen, 650 mg, Q4H PRN  potassium chloride, 20 mEq, PRN  albuterol, 2.5 mg, As Directed RT PRN  heparin (porcine), 80 Units/kg, PRN  sodium chloride flush, 10 mL, PRN  ondansetron, 4 mg, Q8H PRN  [Held by provider] HYDROcodone 5 mg - acetaminophen, 2 tablet, Q8H PRN      VENT Component Value Date    MG 2.0 10/24/2020     Phosphorus: No results found for: PHOS  Ionized Calcium:   Lab Results   Component Value Date    CAION 1.17 10/24/2020        Urinalysis: see chart     Troponin: No results for input(s): TROPONINI in the last 72 hours. Microbiology:    Cultures during this admission:     Blood cultures:                 [x] None drawn      [] Negative             []  Positive (Details:  )  Urine Culture:                   [x] None drawn      [] Negative             []  Positive (Details:  )  Sputum Culture:               [] None drawn       [] Negative             [x]  Positive (Details: Klebsiella pneumoniae )   Endotracheal aspirate:     [] None drawn       [x] Negative             []  Positive (Details:  )     Other pertinent Labs: see charting    Radiology/Imaging:     Chest Xray (10/27/2020):   XR CHEST PORTABLE   Preliminary Result   1. Endotracheal tube terminates at the level of the alejandro and should be   pulled back approximately 2-3 cm.   2. Redemonstration of coarse, diffuse bilateral interstitial opacities with   slightly increased superimposed hazy airspace opacity, possibly edema or   worsening pneumonia. XR CHEST PORTABLE   Final Result   Coarse bilateral airspace opacities within the upper and lower lobes, not   significantly changed, suggesting a multi lobar pneumonia. XR CHEST PORTABLE   Final Result   Partial clearing of bilateral lung opacities. XR CHEST PORTABLE   Final Result   Increased bilateral lung opacities. XR CHEST PORTABLE   Final Result   Bilateral lung opacities likely pneumonia. Enteric tube with the tip within the proximal stomach. Consider slightly   advancing. XR ABDOMEN FOR NG/OG/NE TUBE PLACEMENT   Final Result   Life-support devices as above. Similar-appearing diffuse bilateral heterogeneous opacities and multifocal   consolidations.          XR CHEST PORTABLE   Final Result   Life-support devices as above. Similar-appearing diffuse bilateral heterogeneous opacities and multifocal   consolidations. XR CHEST PORTABLE   Final Result   No significant interval change in multifocal bilateral consolidation, most   concerning for pneumonia. XR CHEST PORTABLE   Final Result   No significant change in the multifocal airspace opacities worrisome for   multifocal pneumonia. ASSESSMENT:     Patient Active Problem List    Diagnosis Date Noted    Acute metabolic encephalopathy 78/85/1958     Priority: High     Class: Acute    Acute respiratory failure with hypoxia and hypercapnia (HCC)     Elevated troponin     Severe sepsis ([address] 10/22/2020    COPD exacerbation ([facility] [address] 10/22/2020    Sepsis due to pneumonia ([facility] [address] 08/16/2020    Lactic acidosis 08/16/2020    Septic shock ([facility] [address] 08/16/2020    Status post surgery 12/20/2018    Pneumonia of right upper lobe due to infectious organism 10/05/2018    Hypothyroidism 10/05/2018    Major depressive disorder 10/05/2018    Chronic midline low back pain without sciatica 10/05/2018    Iron deficiency anemia 10/05/2018     Additional assessment:    · Active Problems:  ·   Severe sepsis (HCC)  ·   COPD exacerbation (HCC)  ·   Acute respiratory failure with hypoxia and hypercapnia (HCC)  ·   Elevated troponin      PLAN:     WEAN PER PROTOCOL:  [] No   [x] Yes  [] N/A    DISCONTINUE ANY LABS:   [x] No   [] Yes    ICU PROPHYLAXIS:  Stress ulcer:  [] PPI Agent  [x] [id number] [] Sucralfate  [] Other:  VTE:   [] Enoxaparin  [x]  Heparin  [] EPC Cuffs    NUTRITION:  [] NPO [] Tube Feeding (Specify: ) [] TPN  [] PO (Diet: No diet orders on file)    HOME MEDICATIONS RECONCILED: [] No  [x] Yes    INSULIN DRIP:   [x] No   [] Yes    CONSULTATION NEEDED:  [x] No   [] Yes    FAMILY UPDATED:    [] No   [x] Yes    TRANSFER OUT OF ICU:   [x] No   [] Yes    ADDITIONAL PLAN:    1.  Acute hypoxic and hypercarbic respiratory failure secondary to COPD exacerbation, multifocal pneumonia and CHF exacerbation  - Continues to be intubated and sedated, paralyzed, wean off as tolerated. - Continue antibiotics and diuretics. 2. Multifocal pneumonia  - Continue rocephin, d/c azithromycin.    - Follow up repeat CXR, appreciate ID recommendations. 3. New onset congestive heart failure secondary to DCMP s/p cardiac cath  - EF 30%, reduced from 50% in 2019, Cardiology on board. - S/P cardiac cath 10/26 - mild CAD. - Continue heparin gtt. - Continue aspirin 81 mg daily. 4. New onset atrial fibrillation with concern for LV thrombus on TTE  - Continue amiodarone gtt for now, heparin drip for anticoagulation, switch to PO amiodarone 200 mg daily. - Currently in NSR, follow up Cardiology recommendations. 5. Septic shock secondary to multifocal pneumonia vs cardiogenic shock - Resolved  - Continue rocephin 1 g q24, f/u ID recommendations. - Wean off steroids, 50 mg daily. 6. Hypothyroidism  - Continue Synthroid 150 mcg daily. DVT prophylaxis - On heparin gtt. GI prophylaxis - Pepcid 20 mg BID    Disposition - To remain in ICU    Nicolasa Oviedo MD      Department of Internal Medicine  [facility]         10/27/2020, 10:12 AM      Attending Physician Statement  I have discussed the care of Anderson Watt, including pertinent history and exam findings with the resident. I have reviewed the key elements of all parts of the encounter with the resident. I have seen and examined the patient with the resident. I agree with the assessment and plan and status of the problem list as documented. I have seen the patient during my round today, I have reviewed the chart, lab seen, arterial blood gases ventilator setting seen. Overnight she was started back on Nimbex drip as she was having stacking of breath and patient ventilator asynchrony and airway trapping PCO2 was increased also.   Multiple ventilator adjustments PM

## 2020-10-27 NOTE — PLAN OF CARE
Problem: Skin Integrity:  Goal: Will show no infection signs and symptoms  Description: Will show no infection signs and symptoms  Outcome: Ongoing  Goal: Absence of new skin breakdown  Description: Absence of new skin breakdown  Outcome: Ongoing     Problem: Falls - Risk of:  Goal: Will remain free from falls  Description: Will remain free from falls  Outcome: Ongoing  Goal: Absence of physical injury  Description: Absence of physical injury  Outcome: Ongoing     Problem: Pain:  Description: Pain management should include both nonpharmacologic and pharmacologic interventions.   Goal: Pain level will decrease  Description: Pain level will decrease  Outcome: Ongoing  Goal: Control of acute pain  Description: Control of acute pain  Outcome: Ongoing  Goal: Control of chronic pain  Description: Control of chronic pain  Outcome: Ongoing     Problem: OXYGENATION/RESPIRATORY FUNCTION  Goal: Patient will maintain patent airway  10/27/2020 1346 by Shira Paz RN  Outcome: Ongoing  10/27/2020 0849 by Wilfredo Martinez RCP  Outcome: Ongoing  10/27/2020 0100 by Je Arias RCP  Outcome: Ongoing  Goal: Patient will achieve/maintain normal respiratory rate/effort  Description: Respiratory rate and effort will be within normal limits for the patient  10/27/2020 1346 by Shira Paz RN  Outcome: Ongoing  10/27/2020 0849 by Wilfredo Martinez RCP  Outcome: Ongoing  10/27/2020 0100 by Je Arias RCP  Outcome: Ongoing     Problem: MECHANICAL VENTILATION  Goal: Patient will maintain patent airway  10/27/2020 1346 by Shira Paz RN  Outcome: Ongoing  10/27/2020 0849 by Wilfredo Martinez RCP  Outcome: Ongoing  10/27/2020 0100 by Je Arias RCP  Outcome: Ongoing  Goal: Oral health is maintained or improved  10/27/2020 1346 by Shira Paz RN  Outcome: Ongoing  10/27/2020 0849 by Wilfredo Martinez RCP  Outcome: Ongoing  10/27/2020 0100 by Je Arias RCP  Outcome: Ongoing  Goal: ET tube will be managed safely  10/27/2020 1346 by Twila Lan RN  Outcome: Ongoing  10/27/2020 0849 by Claudio Dang RCP  Outcome: Ongoing  10/27/2020 0100 by Jasmin Messer RCP  Outcome: Ongoing  Goal: Ability to express needs and understand communication  10/27/2020 1346 by Twila Lan RN  Outcome: Ongoing  10/27/2020 0100 by Jasmin Messer RCP  Outcome: Ongoing  Goal: Mobility/activity is maintained at optimum level for patient  10/27/2020 1346 by Twila Lan RN  Outcome: Ongoing  10/27/2020 0100 by Jasmin Messer RCP  Outcome: Ongoing     Problem: SKIN INTEGRITY  Goal: Skin integrity is maintained or improved  10/27/2020 1346 by Twila Lan RN  Outcome: Ongoing  10/27/2020 0100 by Jasmin Messer RCP  Outcome: Ongoing     Problem: Restraint Use - Nonviolent/Non-Self-Destructive Behavior:  Goal: Absence of restraint indications  Description: Absence of restraint indications  Outcome: Not Met This Shift  Goal: Absence of restraint-related injury  Description: Absence of restraint-related injury  Outcome: Ongoing     Problem: Nutrition  Goal: Optimal nutrition therapy  Description: Nutrition Problem #1: Inadequate oral intake  Intervention: Food and/or Nutrient Delivery: Start nutrition as able; if TF, suggest Standard without Fiber goal 65 mL/hr to provide 1872 kcal and 87 g pro/day.   Nutritional Goals: meet % of estimated nutrient needs     Outcome: Ongoing     Problem: Confusion - Acute:  Goal: Absence of continued neurological deterioration signs and symptoms  Description: Absence of continued neurological deterioration signs and symptoms  Outcome: Ongoing  Goal: Mental status will be restored to baseline  Description: Mental status will be restored to baseline  Outcome: Ongoing     Problem: Discharge Planning:  Goal: Ability to perform activities of daily living will improve  Description: Ability to perform activities of daily living will improve  Outcome: Ongoing  Goal: Participates in care planning  Description: Participates in care planning  Outcome: Ongoing     Problem: Injury - Risk of, Physical Injury:  Goal: Will remain free from falls  Description: Will remain free from falls  Outcome: Ongoing  Goal: Absence of physical injury  Description: Absence of physical injury  Outcome: Ongoing     Problem: Mood - Altered:  Goal: Mood stable  Description: Mood stable  Outcome: Ongoing  Goal: Absence of abusive behavior  Description: Absence of abusive behavior  Outcome: Ongoing  Goal: Verbalizations of feeling emotionally comfortable while being cared for will increase  Description: Verbalizations of feeling emotionally comfortable while being cared for will increase  Outcome: Ongoing     Problem: Psychomotor Activity - Altered:  Goal: Absence of psychomotor disturbance signs and symptoms  Description: Absence of psychomotor disturbance signs and symptoms  Outcome: Ongoing     Problem: Sensory Perception - Impaired:  Goal: Demonstrations of improved sensory functioning will increase  Description: Demonstrations of improved sensory functioning will increase  Outcome: Ongoing  Goal: Decrease in sensory misperception frequency  Description: Decrease in sensory misperception frequency  Outcome: Ongoing  Goal: Able to refrain from responding to false sensory perceptions  Description: Able to refrain from responding to false sensory perceptions  Outcome: Ongoing  Goal: Demonstrates accurate environmental perceptions  Description: Demonstrates accurate environmental perceptions  Outcome: Ongoing  Goal: Able to distinguish between reality-based and nonreality-based thinking  Description: Able to distinguish between reality-based and nonreality-based thinking  Outcome: Ongoing  Goal: Able to interrupt nonreality-based thinking  Description: Able to interrupt nonreality-based thinking  Outcome: Ongoing     Problem: Sleep Pattern Disturbance:  Goal: Appears well-rested  Description: Appears well-rested  Outcome: Ongoing

## 2020-10-27 NOTE — PLAN OF CARE
Problem: Skin Integrity:  Goal: Will show no infection signs and symptoms  Description: Will show no infection signs and symptoms  10/26/2020 2155 by Leti Meyers RN  Outcome: Ongoing  10/26/2020 2134 by Leti Meyers RN  Outcome: Ongoing  10/26/2020 1538 by Chasidy Elizalde RN  Outcome: Ongoing  Goal: Absence of new skin breakdown  Description: Absence of new skin breakdown  10/26/2020 2155 by Leti Meyers RN  Outcome: Ongoing  10/26/2020 2134 by Leti Meyers RN  Outcome: Ongoing  10/26/2020 1538 by Chasidy Elizalde RN  Outcome: Ongoing     Problem: Falls - Risk of:  Goal: Will remain free from falls  Description: Will remain free from falls  10/26/2020 2155 by Leti Meyers RN  Outcome: Ongoing  10/26/2020 2134 by Leti Meyers RN  Outcome: Ongoing  10/26/2020 1538 by Chasidy Elizalde RN  Outcome: Ongoing  Goal: Absence of physical injury  Description: Absence of physical injury  10/26/2020 2155 by Leti Meyers RN  Outcome: Ongoing  10/26/2020 2134 by Leti Meyers RN  Outcome: Ongoing  10/26/2020 1538 by Chasidy Elizalde RN  Outcome: Ongoing     Problem: Pain:  Goal: Pain level will decrease  Description: Pain level will decrease  10/26/2020 2155 by Leti Meyers RN  Outcome: Ongoing  10/26/2020 2134 by Leti Meyers RN  Outcome: Ongoing  10/26/2020 1538 by Chasidy Elizalde RN  Outcome: Ongoing  Goal: Control of acute pain  Description: Control of acute pain  10/26/2020 2155 by Leti Meyers RN  Outcome: Ongoing  10/26/2020 2134 by Leti Meyers RN  Outcome: Ongoing  10/26/2020 1538 by Chasidy Elizalde RN  Outcome: Ongoing  Goal: Control of chronic pain  Description: Control of chronic pain  10/26/2020 2155 by Leti Meyers RN  Outcome: Ongoing  10/26/2020 2134 by Leti Meyers RN  Outcome: Ongoing  10/26/2020 1538 by Chasidy Elizalde RN  Outcome: Ongoing     Problem: OXYGENATION/RESPIRATORY FUNCTION  Goal: Patient will maintain patent airway  10/26/2020 2155 by Marcial Kaur RN  Outcome: Ongoing  10/26/2020 2134 by Marcial Kaur RN  Outcome: Ongoing  10/26/2020 1538 by Leyda Helm RN  Outcome: Ongoing  Goal: Patient will achieve/maintain normal respiratory rate/effort  Description: Respiratory rate and effort will be within normal limits for the patient  10/26/2020 2155 by Marcial Kaur RN  Outcome: Ongoing  10/26/2020 2134 by Marcial Kaur RN  Outcome: Ongoing  10/26/2020 1538 by Leyda Helm RN  Outcome: Ongoing     Problem: MECHANICAL VENTILATION  Goal: Patient will maintain patent airway  10/26/2020 2155 by Marcial Kaur RN  Outcome: Ongoing  10/26/2020 2134 by Marcial Kaur RN  Outcome: Ongoing  10/26/2020 1538 by Leyda Helm RN  Outcome: Ongoing  Goal: Oral health is maintained or improved  10/26/2020 2155 by Marcial Kaur RN  Outcome: Ongoing  10/26/2020 2134 by Marcial Kaur RN  Outcome: Ongoing  10/26/2020 1538 by Leyda Helm RN  Outcome: Ongoing  Goal: ET tube will be managed safely  10/26/2020 2155 by Marcial Kaur RN  Outcome: Ongoing  10/26/2020 2134 by Marcial Kaur RN  Outcome: Ongoing  10/26/2020 1538 by Leyda Helm RN  Outcome: Ongoing  Goal: Ability to express needs and understand communication  10/26/2020 2155 by Marcial Kaur RN  Outcome: Ongoing  10/26/2020 2134 by Marcial Kaur RN  Outcome: Ongoing  10/26/2020 1538 by Leyda Helm RN  Outcome: Ongoing  Goal: Mobility/activity is maintained at optimum level for patient  10/26/2020 2155 by Marcial Kaur RN  Outcome: Ongoing  10/26/2020 2134 by Marcial Kaur RN  Outcome: Ongoing  10/26/2020 1538 by Leyda Helm RN  Outcome: Ongoing     Problem: SKIN INTEGRITY  Goal: Skin integrity is maintained or improved  10/26/2020 2155 by Cozette Flake, RN  Outcome: Ongoing  10/26/2020 2134 by Marcial Kaur RN  Outcome: Ongoing  10/26/2020 1538 by Leyda Helm RN  Outcome: Ongoing     Problem: Restraint Use - Nonviolent/Non-Self-Destructive Behavior:  Goal: Absence of restraint indications  Description: Absence of restraint indications  10/26/2020 2155 by Abisai Hankins RN  Outcome: Ongoing  10/26/2020 2134 by Abisai Hankins RN  Outcome: Ongoing  10/26/2020 1538 by Liliana Lilly RN  Outcome: Ongoing  Goal: Absence of restraint-related injury  Description: Absence of restraint-related injury  10/26/2020 2155 by Abisai Hankins RN  Outcome: Ongoing  10/26/2020 2134 by Abisai Hankins RN  Outcome: Ongoing  10/26/2020 1538 by Liliana Lilly RN  Outcome: Ongoing     Problem: Nutrition  Goal: Optimal nutrition therapy  Description: Nutrition Problem #1: Inadequate oral intake  Intervention: Food and/or Nutrient Delivery: Start nutrition as able; if TF, suggest Standard without Fiber goal 65 mL/hr to provide 1872 kcal and 87 g pro/day.   Nutritional Goals: meet % of estimated nutrient needs     10/26/2020 2155 by Abisai Hankins RN  Outcome: Ongoing  10/26/2020 2134 by Abisai Hankins RN  Outcome: Ongoing  10/26/2020 1538 by Liliana Lilly RN  Outcome: Ongoing     Problem: Confusion - Acute:  Goal: Absence of continued neurological deterioration signs and symptoms  Description: Absence of continued neurological deterioration signs and symptoms  10/26/2020 2155 by Abisai Hankins RN  Outcome: Ongoing  10/26/2020 2134 by Abisai Hankins RN  Outcome: Ongoing  10/26/2020 1538 by Liliana Lilly RN  Outcome: Ongoing  Goal: Mental status will be restored to baseline  Description: Mental status will be restored to baseline  10/26/2020 2155 by Abisai Hankins RN  Outcome: Ongoing  10/26/2020 2134 by Abisai Hankins RN  Outcome: Ongoing  10/26/2020 1538 by Liliana Lilly RN  Outcome: Ongoing     Problem: Discharge Planning:  Goal: Ability to perform activities of daily living will improve  Description: Ability to perform activities of daily living will improve  10/26/2020 2155 by Joce Cortez RN  Outcome: Ongoing  10/26/2020 2134 by Joce Cortez RN  Outcome: Ongoing  10/26/2020 1538 by Felipa Schlatter, RN  Outcome: Ongoing  Goal: Participates in care planning  Description: Participates in care planning  10/26/2020 2155 by Joce Cortez RN  Outcome: Ongoing  10/26/2020 2134 by Joce Cortez RN  Outcome: Ongoing  10/26/2020 1538 by Felipa Schlatter, RN  Outcome: Ongoing     Problem: Injury - Risk of, Physical Injury:  Goal: Will remain free from falls  Description: Will remain free from falls  10/26/2020 2155 by Joce Cortez RN  Outcome: Ongoing  10/26/2020 2134 by Joce Cortez RN  Outcome: Ongoing  10/26/2020 1538 by Felipa Schlatter, RN  Outcome: Ongoing  Goal: Absence of physical injury  Description: Absence of physical injury  10/26/2020 2155 by Joce Cortez RN  Outcome: Ongoing  10/26/2020 2134 by Joce Cortez RN  Outcome: Ongoing  10/26/2020 1538 by Felipa Schlatter, RN  Outcome: Ongoing     Problem: Mood - Altered:  Goal: Mood stable  Description: Mood stable  10/26/2020 2155 by Joce Cortez RN  Outcome: Ongoing  10/26/2020 2134 by Joce Cortez RN  Outcome: Ongoing  10/26/2020 1538 by Felipa Schlatter, RN  Outcome: Ongoing  Goal: Absence of abusive behavior  Description: Absence of abusive behavior  10/26/2020 2155 by Joce Cortez RN  Outcome: Ongoing  10/26/2020 2134 by Joce Cortez RN  Outcome: Ongoing  10/26/2020 1538 by Felipa Schlatter, RN  Outcome: Ongoing  Goal: Verbalizations of feeling emotionally comfortable while being cared for will increase  Description: Verbalizations of feeling emotionally comfortable while being cared for will increase  10/26/2020 2155 by Joce Cortez RN  Outcome: Ongoing  10/26/2020 2134 by Joce Cortez RN  Outcome: Ongoing  10/26/2020 1538 by Felipa Schlatter, RN  Outcome: Ongoing     Problem: Psychomotor Activity - Altered:  Goal: Absence of psychomotor disturbance signs and symptoms  Description: Absence of psychomotor disturbance signs and symptoms  10/26/2020 2155 by Tito Borges RN  Outcome: Ongoing  10/26/2020 2134 by Tito Borges RN  Outcome: Ongoing  10/26/2020 1538 by Gene Bennett RN  Outcome: Ongoing     Problem: Sensory Perception - Impaired:  Goal: Demonstrations of improved sensory functioning will increase  Description: Demonstrations of improved sensory functioning will increase  10/26/2020 2155 by Tito Borges RN  Outcome: Ongoing  10/26/2020 2134 by Tito Borges RN  Outcome: Ongoing  10/26/2020 1538 by Gene Bennett RN  Outcome: Ongoing  Goal: Decrease in sensory misperception frequency  Description: Decrease in sensory misperception frequency  10/26/2020 2155 by Tito Borges RN  Outcome: Ongoing  10/26/2020 2134 by Tito Borges RN  Outcome: Ongoing  10/26/2020 1538 by Gene Bennett RN  Outcome: Ongoing  Goal: Able to refrain from responding to false sensory perceptions  Description: Able to refrain from responding to false sensory perceptions  10/26/2020 2155 by Tito Borges RN  Outcome: Ongoing  10/26/2020 2134 by Tito Borges RN  Outcome: Ongoing  10/26/2020 1538 by Gene Bennett RN  Outcome: Ongoing  Goal: Demonstrates accurate environmental perceptions  Description: Demonstrates accurate environmental perceptions  10/26/2020 2155 by Tito Borges RN  Outcome: Ongoing  10/26/2020 2134 by Tito Borges RN  Outcome: Ongoing  10/26/2020 1538 by Gene Bennett RN  Outcome: Ongoing  Goal: Able to distinguish between reality-based and nonreality-based thinking  Description: Able to distinguish between reality-based and nonreality-based thinking  10/26/2020 2155 by Tito Borges RN  Outcome: Ongoing  10/26/2020 2134 by Tito Borges RN  Outcome: Ongoing  10/26/2020 1538 by Gene Bennett RN  Outcome: Ongoing  Goal: Able to interrupt nonreality-based thinking  Description: Able to interrupt nonreality-based thinking  10/26/2020 2155 by Abimael Whyte RN  Outcome: Ongoing  10/26/2020 2134 by Abimael Whyte RN  Outcome: Ongoing  10/26/2020 1538 by Sirena Kilpatrick RN  Outcome: Ongoing     Problem: Sleep Pattern Disturbance:  Goal: Appears well-rested  Description: Appears well-rested  10/26/2020 2155 by Abimael Whyte RN  Outcome: Ongoing  10/26/2020 2134 by Abimael Whyte RN  Outcome: Ongoing  10/26/2020 1538 by Sirena iKlpatrick RN  Outcome: Ongoing

## 2020-10-27 NOTE — PROGRESS NOTES
Comprehensive Nutrition Assessment    Type and Reason for Visit:  Reassess    Nutrition Recommendations/Plan: Continue tube feeding as tolerated; suggest goal of 65 ml/hr. Will continue to follow. Nutrition Assessment:  Pt remains on vent. S/p cath yesterday. Standard without fiber tube feeding currently at 60 ml/hr with minimal residuals. Meds/labs reviewed. Na 145 mmol/L. Pt is on free water flushes of 300 mL every 6 hours per RN. Malnutrition Assessment:  Malnutrition Status:  Insufficient data    Context:  Acute Illness     Findings of the 6 clinical characteristics of malnutrition:  Energy Intake:  Unable to assess  Weight Loss:  Unable to assess     Body Fat Loss:  No significant body fat loss     Muscle Mass Loss:  No significant muscle mass loss    Fluid Accumulation:  No significant fluid accumulation     Strength:  Not Performed    Estimated Daily Nutrient Needs:  Energy (kcal):  6709-5840 kcal/day; Weight Used for Energy Requirements:  Admission     Protein (g):  80 g pro/day; Weight Used for Protein Requirements:  Ideal(1.5)           Current Nutrition Therapies:    No diet orders on file     Anthropometric Measures:  · Height: 5' 4\" (162.6 cm)  · Current Body Weight: 164 lb (74.4 kg)   · Admission Body Weight: 157 lb 6.5 oz (71.4 kg)    · Ideal Body Weight: 120 lbs; % Ideal Body Weight  137%   · BMI: 28.1  · BMI Categories: Overweight (BMI 25.0-29. 9)       Nutrition Diagnosis:   · Inadequate oral intake related to impaired respiratory function as evidenced by NPO or clear liquid status due to medical condition, nutrition support - enteral nutrition    Nutrition Interventions:   Food and/or Nutrient Delivery: Continue TF as tolerated; suggest goal rate of 65 ml/hr to provide 1872 kcal and 87 g pro/day.   Nutrition Education/Counseling:  No recommendation at this time   Coordination of Nutrition Care:  Continue to monitor while inpatient    Goals:  meet % of estimated nutrient needs -achieved    Nutrition Monitoring and Evaluation:   Food/Nutrient Intake Outcomes:  Enteral Nutrition Intake/Tolerance  Physical Signs/Symptoms Outcomes:  Biochemical Data, Nutrition Focused Physical Findings, Skin, Weight         Electronically signed by Geraldine Guevara RD, ANDREW on 10/27/20 at 12:27 PM EDT    Contact: [phone]

## 2020-10-28 ENCOUNTER — APPOINTMENT (OUTPATIENT)
Dept: GENERAL RADIOLOGY | Age: 75
DRG: 870 | End: 2020-10-28
Attending: INTERNAL MEDICINE
Payer: MEDICARE

## 2020-10-28 LAB
ABSOLUTE EOS #: 0.13 K/UL (ref 0–0.4)
ABSOLUTE IMMATURE GRANULOCYTE: 0 K/UL (ref 0–0.3)
ABSOLUTE LYMPH #: 1.82 K/UL (ref 1–4.8)
ABSOLUTE MONO #: 0.98 K/UL (ref 0.1–0.8)
ALBUMIN SERPL-MCNC: 2.6 G/DL (ref 3.5–5.2)
ALBUMIN/GLOBULIN RATIO: 0.9 (ref 1–2.5)
ALLEN TEST: ABNORMAL
ALP BLD-CCNC: 69 U/L (ref 35–104)
ALT SERPL-CCNC: 9 U/L (ref 5–33)
ANION GAP SERPL CALCULATED.3IONS-SCNC: 7 MMOL/L (ref 9–17)
AST SERPL-CCNC: 10 U/L
BASOPHILS # BLD: 0 % (ref 0–2)
BASOPHILS ABSOLUTE: 0 K/UL (ref 0–0.2)
BILIRUB SERPL-MCNC: 0.16 MG/DL (ref 0.3–1.2)
BUN BLDV-MCNC: 19 MG/DL (ref 8–23)
BUN/CREAT BLD: ABNORMAL (ref 9–20)
CALCIUM SERPL-MCNC: 9.2 MG/DL (ref 8.6–10.4)
CHLORIDE BLD-SCNC: 106 MMOL/L (ref 98–107)
CO2: 33 MMOL/L (ref 20–31)
CREAT SERPL-MCNC: 0.37 MG/DL (ref 0.5–0.9)
DIFFERENTIAL TYPE: ABNORMAL
EOSINOPHILS RELATIVE PERCENT: 2 % (ref 1–4)
FIO2: 40
GFR AFRICAN AMERICAN: >60 ML/MIN
GFR NON-AFRICAN AMERICAN: >60 ML/MIN
GFR SERPL CREATININE-BSD FRML MDRD: ABNORMAL ML/MIN/{1.73_M2}
GFR SERPL CREATININE-BSD FRML MDRD: ABNORMAL ML/MIN/{1.73_M2}
GLUCOSE BLD-MCNC: 134 MG/DL (ref 70–99)
GLUCOSE BLD-MCNC: 151 MG/DL (ref 74–100)
HCT VFR BLD CALC: 34.8 % (ref 36.3–47.1)
HEMOGLOBIN: 10.6 G/DL (ref 11.9–15.1)
IMMATURE GRANULOCYTES: 0 %
LYMPHOCYTES # BLD: 28 % (ref 24–44)
MCH RBC QN AUTO: 30.4 PG (ref 25.2–33.5)
MCHC RBC AUTO-ENTMCNC: 30.5 G/DL (ref 28.4–34.8)
MCV RBC AUTO: 99.7 FL (ref 82.6–102.9)
MODE: ABNORMAL
MONOCYTES # BLD: 15 % (ref 1–7)
MORPHOLOGY: ABNORMAL
NEGATIVE BASE EXCESS, ART: ABNORMAL (ref 0–2)
NRBC AUTOMATED: 0.3 PER 100 WBC
O2 DEVICE/FLOW/%: ABNORMAL
PATIENT TEMP: ABNORMAL
PDW BLD-RTO: 14.9 % (ref 11.8–14.4)
PLATELET # BLD: 179 K/UL (ref 138–453)
PLATELET ESTIMATE: ABNORMAL
PMV BLD AUTO: 10.9 FL (ref 8.1–13.5)
POC HCO3: 38.9 MMOL/L (ref 21–28)
POC LACTIC ACID: 1 MMOL/L (ref 0.56–1.39)
POC O2 SATURATION: 93 % (ref 94–98)
POC PCO2 TEMP: ABNORMAL MM HG
POC PCO2: 69.6 MM HG (ref 35–48)
POC PH TEMP: ABNORMAL
POC PH: 7.36 (ref 7.35–7.45)
POC PO2 TEMP: ABNORMAL MM HG
POC PO2: 74.9 MM HG (ref 83–108)
POSITIVE BASE EXCESS, ART: 11 (ref 0–3)
POTASSIUM SERPL-SCNC: 4.3 MMOL/L (ref 3.7–5.3)
RBC # BLD: 3.49 M/UL (ref 3.95–5.11)
RBC # BLD: ABNORMAL 10*6/UL
SAMPLE SITE: ABNORMAL
SEG NEUTROPHILS: 55 % (ref 36–66)
SEGMENTED NEUTROPHILS ABSOLUTE COUNT: 3.57 K/UL (ref 1.8–7.7)
SODIUM BLD-SCNC: 146 MMOL/L (ref 135–144)
TCO2 (CALC), ART: 41 MMOL/L (ref 22–29)
TOTAL PROTEIN: 5.6 G/DL (ref 6.4–8.3)
WBC # BLD: 6.5 K/UL (ref 3.5–11.3)
WBC # BLD: ABNORMAL 10*3/UL

## 2020-10-28 PROCEDURE — 2580000003 HC RX 258: Performed by: STUDENT IN AN ORGANIZED HEALTH CARE EDUCATION/TRAINING PROGRAM

## 2020-10-28 PROCEDURE — 6360000002 HC RX W HCPCS: Performed by: STUDENT IN AN ORGANIZED HEALTH CARE EDUCATION/TRAINING PROGRAM

## 2020-10-28 PROCEDURE — 2500000003 HC RX 250 WO HCPCS: Performed by: STUDENT IN AN ORGANIZED HEALTH CARE EDUCATION/TRAINING PROGRAM

## 2020-10-28 PROCEDURE — 6370000000 HC RX 637 (ALT 250 FOR IP): Performed by: STUDENT IN AN ORGANIZED HEALTH CARE EDUCATION/TRAINING PROGRAM

## 2020-10-28 PROCEDURE — 94640 AIRWAY INHALATION TREATMENT: CPT

## 2020-10-28 PROCEDURE — 94003 VENT MGMT INPAT SUBQ DAY: CPT

## 2020-10-28 PROCEDURE — 6370000000 HC RX 637 (ALT 250 FOR IP): Performed by: INTERNAL MEDICINE

## 2020-10-28 PROCEDURE — 2000000000 HC ICU R&B

## 2020-10-28 PROCEDURE — 94770 HC ETCO2 MONITOR DAILY: CPT

## 2020-10-28 PROCEDURE — 99233 SBSQ HOSP IP/OBS HIGH 50: CPT | Performed by: INTERNAL MEDICINE

## 2020-10-28 PROCEDURE — 71045 X-RAY EXAM CHEST 1 VIEW: CPT

## 2020-10-28 PROCEDURE — 2580000003 HC RX 258: Performed by: INTERNAL MEDICINE

## 2020-10-28 PROCEDURE — 2700000000 HC OXYGEN THERAPY PER DAY

## 2020-10-28 PROCEDURE — 94761 N-INVAS EAR/PLS OXIMETRY MLT: CPT

## 2020-10-28 PROCEDURE — 82947 ASSAY GLUCOSE BLOOD QUANT: CPT

## 2020-10-28 PROCEDURE — 6360000002 HC RX W HCPCS: Performed by: INTERNAL MEDICINE

## 2020-10-28 PROCEDURE — 80053 COMPREHEN METABOLIC PANEL: CPT

## 2020-10-28 PROCEDURE — 99291 CRITICAL CARE FIRST HOUR: CPT | Performed by: INTERNAL MEDICINE

## 2020-10-28 PROCEDURE — 82803 BLOOD GASES ANY COMBINATION: CPT

## 2020-10-28 PROCEDURE — 85025 COMPLETE CBC W/AUTO DIFF WBC: CPT

## 2020-10-28 PROCEDURE — 83605 ASSAY OF LACTIC ACID: CPT

## 2020-10-28 PROCEDURE — 37799 UNLISTED PX VASCULAR SURGERY: CPT

## 2020-10-28 RX ORDER — DIGOXIN 0.25 MG/ML
250 INJECTION INTRAMUSCULAR; INTRAVENOUS EVERY 6 HOURS
Status: COMPLETED | OUTPATIENT
Start: 2020-10-28 | End: 2020-10-29

## 2020-10-28 RX ORDER — METOPROLOL TARTRATE 5 MG/5ML
5 INJECTION INTRAVENOUS ONCE
Status: COMPLETED | OUTPATIENT
Start: 2020-10-28 | End: 2020-10-28

## 2020-10-28 RX ORDER — POLYETHYLENE GLYCOL 3350 17 G/17G
17 POWDER, FOR SOLUTION ORAL DAILY
Status: DISCONTINUED | OUTPATIENT
Start: 2020-10-28 | End: 2020-11-06 | Stop reason: HOSPADM

## 2020-10-28 RX ORDER — DOCUSATE SODIUM 100 MG/1
100 CAPSULE, LIQUID FILLED ORAL DAILY
Status: DISCONTINUED | OUTPATIENT
Start: 2020-10-28 | End: 2020-10-28

## 2020-10-28 RX ORDER — FUROSEMIDE 10 MG/ML
40 INJECTION INTRAMUSCULAR; INTRAVENOUS 2 TIMES DAILY
Status: DISCONTINUED | OUTPATIENT
Start: 2020-10-28 | End: 2020-10-30

## 2020-10-28 RX ADMIN — DIGOXIN 250 MCG: 0.25 INJECTION INTRAMUSCULAR; INTRAVENOUS at 11:50

## 2020-10-28 RX ADMIN — IPRATROPIUM BROMIDE AND ALBUTEROL SULFATE 1 AMPULE: .5; 3 SOLUTION RESPIRATORY (INHALATION) at 03:13

## 2020-10-28 RX ADMIN — IPRATROPIUM BROMIDE AND ALBUTEROL SULFATE 1 AMPULE: .5; 3 SOLUTION RESPIRATORY (INHALATION) at 19:50

## 2020-10-28 RX ADMIN — PREGABALIN 300 MG: 100 CAPSULE ORAL at 21:57

## 2020-10-28 RX ADMIN — IPRATROPIUM BROMIDE AND ALBUTEROL SULFATE 1 AMPULE: .5; 3 SOLUTION RESPIRATORY (INHALATION) at 07:50

## 2020-10-28 RX ADMIN — Medication 200 MCG/HR: at 00:26

## 2020-10-28 RX ADMIN — AMIODARONE HYDROCHLORIDE 1 MG/MIN: 50 INJECTION, SOLUTION INTRAVENOUS at 10:46

## 2020-10-28 RX ADMIN — CISATRACURIUM BESYLATE 3.5 MCG/KG/MIN: 200 INJECTION INTRAVENOUS at 09:47

## 2020-10-28 RX ADMIN — CEFTRIAXONE SODIUM 2 G: 2 INJECTION, POWDER, FOR SOLUTION INTRAMUSCULAR; INTRAVENOUS at 11:34

## 2020-10-28 RX ADMIN — FAMOTIDINE 20 MG: 10 INJECTION INTRAVENOUS at 09:28

## 2020-10-28 RX ADMIN — ENOXAPARIN SODIUM 70 MG: 80 INJECTION SUBCUTANEOUS at 21:56

## 2020-10-28 RX ADMIN — METOPROLOL TARTRATE 12.5 MG: 25 TABLET ORAL at 21:57

## 2020-10-28 RX ADMIN — FAMOTIDINE 20 MG: 10 INJECTION INTRAVENOUS at 21:56

## 2020-10-28 RX ADMIN — SODIUM CHLORIDE, PRESERVATIVE FREE 10 ML: 5 INJECTION INTRAVENOUS at 21:00

## 2020-10-28 RX ADMIN — HYDROCORTISONE SODIUM SUCCINATE 50 MG: 100 INJECTION, POWDER, FOR SOLUTION INTRAMUSCULAR; INTRAVENOUS at 16:10

## 2020-10-28 RX ADMIN — IPRATROPIUM BROMIDE AND ALBUTEROL SULFATE 1 AMPULE: .5; 3 SOLUTION RESPIRATORY (INHALATION) at 15:56

## 2020-10-28 RX ADMIN — FUROSEMIDE 40 MG: 10 INJECTION, SOLUTION INTRAMUSCULAR; INTRAVENOUS at 15:03

## 2020-10-28 RX ADMIN — POTASSIUM BICARBONATE 40 MEQ: 782 TABLET, EFFERVESCENT ORAL at 09:28

## 2020-10-28 RX ADMIN — ASPIRIN 81 MG: 81 TABLET, CHEWABLE ORAL at 09:28

## 2020-10-28 RX ADMIN — LEVOTHYROXINE SODIUM 150 MCG: 75 TABLET ORAL at 09:28

## 2020-10-28 RX ADMIN — Medication 7 MG/HR: at 03:58

## 2020-10-28 RX ADMIN — ACETAMINOPHEN 650 MG: 325 TABLET ORAL at 17:44

## 2020-10-28 RX ADMIN — Medication 150 MCG/HR: at 16:23

## 2020-10-28 RX ADMIN — AMIODARONE HYDROCHLORIDE 1 MG/MIN: 50 INJECTION, SOLUTION INTRAVENOUS at 13:54

## 2020-10-28 RX ADMIN — METOPROLOL TARTRATE 5 MG: 1 INJECTION, SOLUTION INTRAVENOUS at 10:09

## 2020-10-28 RX ADMIN — Medication 200 MCG/HR: at 05:23

## 2020-10-28 RX ADMIN — PREGABALIN 300 MG: 100 CAPSULE ORAL at 09:28

## 2020-10-28 RX ADMIN — Medication 150 MCG/HR: at 22:55

## 2020-10-28 RX ADMIN — Medication 200 MCG/HR: at 09:48

## 2020-10-28 RX ADMIN — SODIUM CHLORIDE, PRESERVATIVE FREE 10 ML: 5 INJECTION INTRAVENOUS at 09:00

## 2020-10-28 RX ADMIN — POLYETHYLENE GLYCOL 3350 17 G: 17 POWDER, FOR SOLUTION ORAL at 16:11

## 2020-10-28 RX ADMIN — AMIODARONE HYDROCHLORIDE 150 MG: 50 INJECTION, SOLUTION INTRAVENOUS at 10:23

## 2020-10-28 RX ADMIN — Medication 7 MG/HR: at 17:59

## 2020-10-28 RX ADMIN — ENOXAPARIN SODIUM 70 MG: 80 INJECTION SUBCUTANEOUS at 09:28

## 2020-10-28 RX ADMIN — DOCUSATE SODIUM 100 MG: 50 LIQUID ORAL at 16:11

## 2020-10-28 RX ADMIN — DIGOXIN 250 MCG: 0.25 INJECTION INTRAMUSCULAR; INTRAVENOUS at 17:45

## 2020-10-28 RX ADMIN — AMIODARONE HYDROCHLORIDE 200 MG: 200 TABLET ORAL at 09:28

## 2020-10-28 RX ADMIN — HYDROCORTISONE SODIUM SUCCINATE 50 MG: 100 INJECTION, POWDER, FOR SOLUTION INTRAMUSCULAR; INTRAVENOUS at 09:28

## 2020-10-28 RX ADMIN — Medication 2 MCG/MIN: at 12:57

## 2020-10-28 RX ADMIN — FUROSEMIDE 40 MG: 10 INJECTION, SOLUTION INTRAMUSCULAR; INTRAVENOUS at 23:55

## 2020-10-28 ASSESSMENT — PULMONARY FUNCTION TESTS
PIF_VALUE: 31
PIF_VALUE: 20
PIF_VALUE: 24
PIF_VALUE: 29
PIF_VALUE: 22
PIF_VALUE: 17

## 2020-10-28 NOTE — PLAN OF CARE
Problem: Skin Integrity:  Goal: Absence of new skin breakdown  Description: Absence of new skin breakdown  Outcome: Met This Shift     Problem: Falls - Risk of:  Goal: Will remain free from falls  Description: Will remain free from falls  Outcome: Met This Shift  Goal: Absence of physical injury  Description: Absence of physical injury  Outcome: Met This Shift     Problem: OXYGENATION/RESPIRATORY FUNCTION  Goal: Patient will maintain patent airway  10/28/2020 1821 by Anay Falcon RN  Outcome: Met This Shift  10/28/2020 1020 by aMriah Roper RCP  Outcome: Ongoing  10/28/2020 1018 by Mariah Roper RCP  Outcome: Ongoing     Problem: MECHANICAL VENTILATION  Goal: Patient will maintain patent airway  10/28/2020 1821 by Anay Falcon RN  Outcome: Met This Shift  10/28/2020 1020 by Mariah Roper RCP  Outcome: Ongoing  10/28/2020 1018 by Mariah Roper RCP  Outcome: Ongoing  Goal: Oral health is maintained or improved  10/28/2020 1821 by Anay Falcon RN  Outcome: Met This Shift  10/28/2020 1020 by Mariah Roper RCP  Outcome: Ongoing  10/28/2020 1018 by Mariah Roper RCP  Outcome: Ongoing  Goal: ET tube will be managed safely  10/28/2020 1821 by Anay Falcon RN  Outcome: Met This Shift  10/28/2020 1020 by Mariah Roper RCP  Outcome: Ongoing     Problem: SKIN INTEGRITY  Goal: Skin integrity is maintained or improved  Outcome: Met This Shift     Problem: Restraint Use - Nonviolent/Non-Self-Destructive Behavior:  Goal: Absence of restraint-related injury  Description: Absence of restraint-related injury  Outcome: Met This Shift     Problem: Nutrition  Goal: Optimal nutrition therapy  Description: Nutrition Problem #1: Inadequate oral intake  Intervention: Food and/or Nutrient Delivery: Start nutrition as able; if TF, suggest Standard without Fiber goal 65 mL/hr to provide 1872 kcal and 87 g pro/day.   Nutritional Goals: meet % of estimated nutrient needs     Outcome: Met This restored to baseline  Description: Mental status will be restored to baseline  Outcome: Not Met This Shift     Problem: Discharge Planning:  Goal: Ability to perform activities of daily living will improve  Description: Ability to perform activities of daily living will improve  Outcome: Not Met This Shift  Goal: Participates in care planning  Description: Participates in care planning  Outcome: Not Met This Shift     Problem: Mood - Altered:  Goal: Verbalizations of feeling emotionally comfortable while being cared for will increase  Description: Verbalizations of feeling emotionally comfortable while being cared for will increase  Outcome: Not Met This Shift     Problem: Psychomotor Activity - Altered:  Goal: Absence of psychomotor disturbance signs and symptoms  Description: Absence of psychomotor disturbance signs and symptoms  Outcome: Not Met This Shift     Problem: Sensory Perception - Impaired:  Goal: Demonstrations of improved sensory functioning will increase  Description: Demonstrations of improved sensory functioning will increase  Outcome: Not Met This Shift  Goal: Decrease in sensory misperception frequency  Description: Decrease in sensory misperception frequency  Outcome: Not Met This Shift  Goal: Able to refrain from responding to false sensory perceptions  Description: Able to refrain from responding to false sensory perceptions  Outcome: Not Met This Shift  Goal: Demonstrates accurate environmental perceptions  Description: Demonstrates accurate environmental perceptions  Outcome: Not Met This Shift  Goal: Able to distinguish between reality-based and nonreality-based thinking  Description: Able to distinguish between reality-based and nonreality-based thinking  Outcome: Not Met This Shift  Goal: Able to interrupt nonreality-based thinking  Description: Able to interrupt nonreality-based thinking  Outcome: Not Met This Shift     Problem: Sleep Pattern Disturbance:  Goal: Appears well-rested  Description: Appears well-rested  Outcome: Not Met This Shift

## 2020-10-28 NOTE — PROGRESS NOTES
[facility] - Progress Note    Today's Date and Time: 10/28/2020, 7:35 AM    Impression :   · Septic Shock   · Bandemia  · CHF with elevated ProBNP  · Pulmonary edema  · Superimposed pneumonia; sputum cultures 10/25/20 grew Klebsiella Pneumoniae moderate growth  · COPD  · Elevated Troponins  · Hypothyroidism    Recommendations:   · Rocephin 2 gm IV q 24 hr (start date 10/23)  · D/C Zithromax 500 mg IV q 24 hr (start date 10/23. Stop date 10-27-20)  Medical Decision Making/Summary/Discussion:   · Patient with CHF  · Rapid deterioration with acute hypoxia and hypercarbia requiring intubation  · CXR with rapid fluid shifts suggesting pulmonary edema. Very high ProBNP  · Can not exclude associated pneumonia at this stage. Pro calcitonin elevated  Infection Control Recommendations   · [place] Precautions    Antimicrobial Stewardship Recommendations     · Simplification of therapy  · Targeted therapy  Coordination of Outpatient Care:   · Estimated Length of IV antimicrobials:TBD  · Patient will need Midline Catheter Insertion: No  · Patient will need PICC line Insertion:Yes  · Patient will need: Home IV , [place],  SNF,  LTAC: TBD  · Patient will need outpatient wound care:No    Chief complaint/reason for consultation:   · Sepsis secondary to pneumonia      History of Present Illness:   Keyur Pelayo is a 77y.o.-year-old  female who was initially admitted on 10/22/2020. Patient seen at the request of Dr. Gemini Rainey:    History was obtained from chart review and unobtainable from patient due to mental status. She has a history of hypothyroidism, COPD, iron deficiency anemia, presented to [place] [address] emergency department on 10/22 for difficulties breathing. Found to have pneumonia, and started on the sepsis protocol. At [facility], she is unable to provide much history as she is somnolent, but does follow commands.  per EMR review became on the morning of Not on file   Lifestyle    Physical activity     Days per week: Not on file     Minutes per session: Not on file    Stress: Not on file   Relationships    Social connections     Talks on phone: Not on file     Gets together: Not on file     Attends Nondenominational service: Not on file     Active member of club or organization: Not on file     Attends meetings of clubs or organizations: Not on file     Relationship status: Not on file    Intimate partner violence     Fear of current or ex partner: Not on file     Emotionally abused: Not on file     Physically abused: Not on file     Forced sexual activity: Not on file   Other Topics Concern    Not on file   Social History Narrative    Not on file       Family History:   No family history on file. Allergies:   Patient has no known allergies. Review of Systems:   Review of Systems   Unable to perform ROS: Intubated          Physical Examination :     Patient Vitals for the past 8 hrs:   Temp Temp src Pulse Resp SpO2   10/28/20 0400 98.8 °F (37.1 °C) Oral -- -- --   10/28/20 0310 -- -- 95 20 95 %     Physical Exam  Constitutional:       General: She is not in acute distress. Appearance: Normal appearance. She is obese. She is ill-appearing. HENT:      Head: Normocephalic and atraumatic. Nose: Nose normal. No congestion. Mouth/Throat:      Mouth: Mucous membranes are moist.   Eyes:      General: No scleral icterus. Conjunctiva/sclera: Conjunctivae normal.   Neck:      Musculoskeletal: Neck supple. No neck rigidity. Cardiovascular:      Rate and Rhythm: Normal rate and regular rhythm. Heart sounds: Normal heart sounds. No murmur. Pulmonary:      Effort: No respiratory distress. Breath sounds: Rales present. Abdominal:      General: There is no distension. Palpations: Abdomen is soft. Tenderness: There is no abdominal tenderness.    Genitourinary:     Comments: Urine maral  R fem line in good condition  Musculoskeletal: General: No swelling or tenderness. Skin:     Coloration: Skin is not jaundiced or pale. Neurological:      Comments: Sedated    Psychiatric:      Comments: Calm on the vent sedated          Medical Decision Making -Laboratory:   I have independently reviewed/ordered the following labs:    CBC with Differential:   Recent Labs     10/27/20  0455 10/28/20  0540   WBC 9.7 6.5   HGB 10.9* 10.6*   HCT 35.1* 34.8*    179   LYMPHOPCT 6* 28   MONOPCT 14* 15*     BMP:   Recent Labs     10/27/20  0455 10/27/20  1817 10/28/20  0540   * 143 146*   K 3.5* 3.9 4.3   *  --  106   CO2 27  --  33*   BUN 14  --  19   CREATININE 0.44*  --  0.37*     Hepatic Function Panel:   Recent Labs     10/27/20  0455 10/28/20  0540   PROT 5.9* 5.6*   LABALBU 2.9* 2.6*   BILITOT 0.25* 0.16*   ALKPHOS 87 69   ALT 9 9   AST 13 10     No results for input(s): RPR in the last 72 hours. No results for input(s): HIV in the last 72 hours. No results for input(s): BC in the last 72 hours. Lab Results   Component Value Date    MUCUS 2+ 08/28/2019    RBC 3.49 10/28/2020    TRICHOMONAS NOT REPORTED 08/28/2019    WBC 6.5 10/28/2020    YEAST NOT REPORTED 08/28/2019    TURBIDITY CLEAR 10/22/2020     Lab Results   Component Value Date    CREATININE 0.37 10/28/2020    GLUCOSE 134 10/28/2020       Medical Decision Making-Imaging:   10 -28 -20:        10-27-20        10/23/20:            Shay Quinones MD   PGY1, Internal Medicine    ATTESTATION:    I have discussed the case, including pertinent history and exam findings with the residents and students. I have seen and examined the patient and the key elements of the encounter have been performed by me. I was present when the student obtained his information or examined the patient. I have reviewed the laboratory data, other diagnostic studies and discussed them with the residents. I have updated the medical record where necessary.     I agree with the assessment, plan and orders as documented by the resident/ student.     Desmond Tadeo MD.

## 2020-10-28 NOTE — PROGRESS NOTES
INTENSIVE CARE UNIT  Resident Physician Progress Note    Patient - Ren Quiles  Date of Admission -  10/22/2020  6:58 PM  Date of Evaluation -  10/28/2020  Room and Bed Number -  0126/0126-01   Hospital Day - 6    SUBJECTIVE:     OVERNIGHT EVENTS:      No acute events overnight. Pt has been afebrile. Pt currently sedated and on nimbex gtt for difficulty ventilating on the vent. Pt was hemodynamically stable, with no A-fib w/ RVR. TF at goal and tolerating. TODAY:     AWAKE & FOLLOWING COMMANDS: [x]   No  []   Yes                           SECRETIONS                 Amount:  []   Small []   Moderate []   Large [x]   None        Color: []   White []   Colored []   Bloody                         SEDATION:                 RAAS Score: []   +1 to -1 []   -2 []   -3 [x]   -4        Sedation Agent: [x]   Propofol gtt []   Versed gtt  []   Ativan gtt  []   No Sedation        Paralytic Agent: []   Precedex []   Norcuron [x]   Nimbex []  None                       VASOPRESSORS:  [x]   No  []   Yes            Vasopressor Agent []   Levophed []   Dopamine []   Vasopressin []   Dobutamine  []   Phenylephrine  []   Epinephrine     OBJECTIVE:   VITAL SIGNS:  Patient Vitals for the past 8 hrs:   Pulse Resp SpO2   10/28/20 0310 95 20 95 %   10/27/20 2326 93 20 96 %       Last Body weight:   Wt Readings from Last 3 Encounters:   10/27/20 164 lb (74.4 kg)   10/22/20 164 lb (74.4 kg)   20 164 lb 9.6 oz (74.7 kg)       Body Mass Index : Body mass index is 28.15 kg/m². Tmax over 24 hours: Temp (24hrs), Av.3 °F (37.4 °C), Min:98.6 °F (37 °C), Max:99.9 °F (37.7 °C)      Ins/Outs:    In: 1851 [I.V.:1081; NG/GT:770]  Out: 2765 [Urine:2765]    PHYSICAL EXAM:  Constitutional: Intubated, paralyzed  EENT: sclera clear, anicteric, oropharynx clear, no lesions, intubated  Neck: Supple, symmetrical, trachea midline, no adenopathy, thyroid symmetric, no jvd  Respiratory: Diffuse rhonchi in all fields  Cardiovascular: regular rate and rhythm, normal S1, S2, no murmur noted and 2+ pulses throughout  Abdomen: soft, nontender, nondistended, no masses or organomegaly  : Sheikh in palce  Extremities:  peripheral pulses normal, no pedal edema, no clubbing or cyanosis    MEDICATIONS:  Scheduled Meds:   potassium bicarb-citric acid  40 mEq Oral Daily    cefTRIAXone (ROCEPHIN) IV  2 g Intravenous Q24H    amiodarone  200 mg Oral BID    Followed by   Eduin Bai ON 11/1/2020] amiodarone  200 mg Oral Daily    enoxaparin  1 mg/kg Subcutaneous BID    sodium chloride flush  10 mL Intravenous 2 times per day    aspirin  81 mg Oral Daily    hydrocortisone sodium succinate PF  50 mg Intravenous Q8H    ipratropium-albuterol  1 ampule Inhalation Q6H    famotidine (PEPCID) injection  20 mg Intravenous BID    anesthetic and narcotic custom mixture   Intrathecal Once    levothyroxine  150 mcg Oral Daily    pregabalin  300 mg Oral BID    sodium chloride flush  10 mL Intravenous 2 times per day       Continuous Infusions:   dextrose 5% and 0.9% NaCl with KCl 20 mEq 50 mL/hr at 10/27/20 1808    propofol Stopped (10/27/20 0240)    phenylephrine (TOMÁS-SYNEPHRINE) 50mg/250mL infusion Stopped (10/27/20 0452)    norepinephrine Stopped (10/24/20 0644)    fentaNYL 200 mcg/hr (10/28/20 0523)    cisatracurium (NIMBEX) infusion 5 mcg/kg/min (10/27/20 2100)    midazolam 7 mg/hr (10/28/20 0358)    vasopressin (Septic Shock) infusion Stopped (10/25/20 0400)    propofol 30 mcg/kg/min (10/26/20 0944)       PRN Meds:   heparin (porcine), 40 Units/kg, PRN  sodium chloride flush, 10 mL, PRN  acetaminophen, 650 mg, Q4H PRN  potassium chloride, 20 mEq, PRN  albuterol, 2.5 mg, As Directed RT PRN  heparin (porcine), 80 Units/kg, PRN  sodium chloride flush, 10 mL, PRN  ondansetron, 4 mg, Q8H PRN  [Held by provider] HYDROcodone 5 mg - acetaminophen, 2 tablet, Q8H PRN        SUPPORT DEVICES: [x] Ventilator [] BIPAP  [] Nasal Cannula [] Room Air  VENT SETTINGS the upper and lower lobes, not   significantly changed, suggesting a multi lobar pneumonia. XR CHEST PORTABLE   Final Result   Partial clearing of bilateral lung opacities. XR CHEST PORTABLE   Final Result   Increased bilateral lung opacities. XR CHEST PORTABLE   Final Result   Bilateral lung opacities likely pneumonia. Enteric tube with the tip within the proximal stomach. Consider slightly   advancing. XR ABDOMEN FOR NG/OG/NE TUBE PLACEMENT   Final Result   Life-support devices as above. Similar-appearing diffuse bilateral heterogeneous opacities and multifocal   consolidations. XR CHEST PORTABLE   Final Result   Life-support devices as above. Similar-appearing diffuse bilateral heterogeneous opacities and multifocal   consolidations. XR CHEST PORTABLE   Final Result   No significant interval change in multifocal bilateral consolidation, most   concerning for pneumonia. XR CHEST PORTABLE   Final Result   No significant change in the multifocal airspace opacities worrisome for   multifocal pneumonia.          XR CHEST PORTABLE    (Results Pending)       ASSESSMENT:     Patient Active Problem List    Diagnosis Date Noted    Acute metabolic encephalopathy 04/73/1200     Priority: High     Class: Acute    Acute respiratory failure with hypoxia and hypercapnia (HCC)     Elevated troponin     Severe sepsis ([address] 10/22/2020    COPD exacerbation ([address] 10/22/2020    Sepsis due to pneumonia ([address] 08/16/2020    Lactic acidosis 08/16/2020    Septic shock ([address] 08/16/2020    Status post surgery 12/20/2018    Pneumonia of right upper lobe due to infectious organism 10/05/2018    Hypothyroidism 10/05/2018    Major depressive disorder 10/05/2018    Chronic midline low back pain without sciatica 10/05/2018    Iron deficiency anemia 10/05/2018        PLAN:      WEAN PER PROTOCOL:  [x]   No  []   Yes []   N/A                         ICU PROPHYLAXIS: remain ICU    Nidhi Dunn DO  Emergency Medicine Resident, PGY2  Critical Care Service   10/28/20 6:26 AM      Attending Physician Statement  I have discussed the care of Amanda Wang, including pertinent history and exam findings with the resident. I have reviewed the key elements of all parts of the encounter with the resident. I have seen and examined the patient with the resident. I agree with the assessment and plan and status of the problem list as documented. I have seen the patient during my round today, I reviewed the chart, events noted arterial blood gases and ventilator setting reviewed. She had been hypotensive and she was again having tachycardia atrial fibrillation with rapid ventricular rate her amiodarone was switched yesterday by cardiology from IV to oral but this morning she had received amiodarone bolus again and started on IV amiodarone and oral amiodarone is on hold. She also received Lopressor 5 mg for A. fib with RVR and her heart rate is better but she became hypotensive. She is also started on digoxin by cardiology. For more than 24 hours she is on neuromuscular blockade the leg cane because of his stacking of her breath and patient ventilator asynchrony despite being on sedation. She is currently on Versed drip and also on fentanyl drip at 175 mcg will try to decrease the fentanyl drip. ABG 7.36/69/75/39, ABGPRVC/22/480/8/40%  We will try to take her off neuromuscular blockade and see if she will tolerate ventilator better. Her chest x-ray is consistent with pulmonary edema without much change. Urine output is 3.2 L in last 24 hours. DC all IV fluid, she is tolerating tube feeding  Well will try to take her off Nimbex will keep her on Versed and fentanyl. We will revise hydrocortisone to make a total of 5 days. Continue with Rocephin and follow-up with infectious disease. Discussed with nursing staff, treatment and plan discussed.   Discussed with respiratory therapist.    Total critical care time caring for this patient with life threatening, unstable organ failure, including direct patient contact, management of life support systems, review of data including imaging and labs, discussions with other team members and physicians at least 27  Min so far today, excluding procedures. Please note that this chart was generated using voice recognition Dragon dictation software. Although every effort was made to ensure the accuracy of this automated transcription, some errors in transcription may have occurred.      Oscar Jha MD  10/28/2020 10:49 AM

## 2020-10-28 NOTE — PLAN OF CARE
Problem: OXYGENATION/RESPIRATORY FUNCTION  Goal: Patient will maintain patent airway  10/27/2020 2110 by Shilpa Lucero RCP  Outcome: Ongoing  Goal: Patient will achieve/maintain normal respiratory rate/effort  Description: Respiratory rate and effort will be within normal limits for the patient  10/27/2020 2110 by Shilpa Lucero RCP  Outcome: Ongoing     Problem: MECHANICAL VENTILATION  Goal: Patient will maintain patent airway  10/27/2020 2110 by Shilpa Lucero RCP  Outcome: Ongoing  Goal: Oral health is maintained or improved  10/27/2020 2110 by Shilpa Lucero RCP  Outcome: Ongoing  Goal: ET tube will be managed safely  10/27/2020 2110 by Shilpa Lucero RCP  Outcome: Ongoing  Goal: Ability to express needs and understand communication  10/27/2020 2110 by Shilpa Lucero RCP  Outcome: Ongoing  Goal: Mobility/activity is maintained at optimum level for patient  10/27/2020 2110 by Shilpa Lucero RCP  Outcome: Ongoing     Problem: SKIN INTEGRITY  Goal: Skin integrity is maintained or improved  10/27/2020 2110 by Shilpa Lucero RCP  Outcome: Ongoing

## 2020-10-28 NOTE — PROGRESS NOTES
Ventilator Bronchodilator assessment    Breath sounds: clear/di,inished  Inspiratory Pressure: 29  Plateau Pressure: 29    Patient assessed at level 2          []    Bronchodilator Assessment    BRONCHODILATOR ASSESSMENT SCORE  Score 0 (Home) 1 2 3 4   Breath Sounds   []  Chronic Ventilator: Patient at baseline []  Mild Wheezes/ Clear [x]  Intermittent wheezes with good air entry []  Bilateral/unilateral wheezing with diminished air entry []  Insp/Exp wheeze and/or poor aeration   Ventilator Pressures   []  Chronic Ventilator []  Insp. Pressure less than 25 cm H20 []  Insp. Pressure less than 25 cm H20 [x]  Insp. Pressure exceeds 25 cm H20 []  Insp.  Pressure exceeds 30 cm H20   Plateau Pressure []  NA   [x]  Plateau Pressure less than 4  [x]  Plateau Pressure less than or equal to 5 []  Plateau Pressure greater than or equal to 6 []  Plateau Pressure greater than or equal to 8       [facility]  6:21 PM

## 2020-10-28 NOTE — PLAN OF CARE
Problem: OXYGENATION/RESPIRATORY FUNCTION  Goal: Patient will maintain patent airway  10/28/2020 1018 by Tj Mesa RCP  Outcome: Ongoing  10/27/2020 2110 by Tova Arriola RCP  Outcome: Ongoing     Problem: OXYGENATION/RESPIRATORY FUNCTION  Goal: Patient will achieve/maintain normal respiratory rate/effort  Description: Respiratory rate and effort will be within normal limits for the patient  10/28/2020 1018 by Tj Mesa RCP  Outcome: Ongoing     Problem: MECHANICAL VENTILATION  Goal: Patient will maintain patent airway  10/28/2020 1018 by Tj Mesa RCP  Outcome: Ongoing     Problem: MECHANICAL VENTILATION  Goal: Oral health is maintained or improved  10/28/2020 1018 by Tj Mesa RCP  Outcome: Ongoing     Problem: MECHANICAL VENTILATION  Goal: ET tube will be managed safely  10/27/2020 2110 by Tova Arriola RCP  Outcome: Ongoing

## 2020-10-29 LAB
ABSOLUTE EOS #: 0 K/UL (ref 0–0.4)
ABSOLUTE IMMATURE GRANULOCYTE: 0.2 K/UL (ref 0–0.3)
ABSOLUTE LYMPH #: 1.88 K/UL (ref 1–4.8)
ABSOLUTE MONO #: 0.69 K/UL (ref 0.1–0.8)
ACTION: NORMAL
ALBUMIN SERPL-MCNC: 2.6 G/DL (ref 3.5–5.2)
ALBUMIN/GLOBULIN RATIO: 0.9 (ref 1–2.5)
ALLEN TEST: ABNORMAL
ALP BLD-CCNC: 68 U/L (ref 35–104)
ALT SERPL-CCNC: 14 U/L (ref 5–33)
ANION GAP SERPL CALCULATED.3IONS-SCNC: 6 MMOL/L (ref 9–17)
AST SERPL-CCNC: 15 U/L
BASOPHILS # BLD: 0 % (ref 0–2)
BASOPHILS ABSOLUTE: 0 K/UL (ref 0–0.2)
BILIRUB SERPL-MCNC: 0.29 MG/DL (ref 0.3–1.2)
BUN BLDV-MCNC: 24 MG/DL (ref 8–23)
BUN/CREAT BLD: ABNORMAL (ref 9–20)
CALCIUM SERPL-MCNC: 9.3 MG/DL (ref 8.6–10.4)
CHLORIDE BLD-SCNC: 100 MMOL/L (ref 98–107)
CO2: 38 MMOL/L (ref 20–31)
CREAT SERPL-MCNC: 0.47 MG/DL (ref 0.5–0.9)
DATE AND TIME: NORMAL
DIFFERENTIAL TYPE: ABNORMAL
EOSINOPHILS RELATIVE PERCENT: 0 % (ref 1–4)
FIO2: 40
GFR AFRICAN AMERICAN: >60 ML/MIN
GFR NON-AFRICAN AMERICAN: >60 ML/MIN
GFR SERPL CREATININE-BSD FRML MDRD: ABNORMAL ML/MIN/{1.73_M2}
GFR SERPL CREATININE-BSD FRML MDRD: ABNORMAL ML/MIN/{1.73_M2}
GLUCOSE BLD-MCNC: 126 MG/DL (ref 70–99)
GLUCOSE BLD-MCNC: 127 MG/DL (ref 74–100)
HCT VFR BLD CALC: 33.5 % (ref 36.3–47.1)
HEMOGLOBIN: 10.4 G/DL (ref 11.9–15.1)
IMMATURE GRANULOCYTES: 2 %
LYMPHOCYTES # BLD: 19 % (ref 24–44)
MCH RBC QN AUTO: 30.4 PG (ref 25.2–33.5)
MCHC RBC AUTO-ENTMCNC: 31 G/DL (ref 28.4–34.8)
MCV RBC AUTO: 98 FL (ref 82.6–102.9)
MODE: ABNORMAL
MONOCYTES # BLD: 7 % (ref 1–7)
MORPHOLOGY: ABNORMAL
NEGATIVE BASE EXCESS, ART: ABNORMAL (ref 0–2)
NOTIFY: NORMAL
NRBC AUTOMATED: 0 PER 100 WBC
O2 DEVICE/FLOW/%: ABNORMAL
PATIENT TEMP: ABNORMAL
PDW BLD-RTO: 14.5 % (ref 11.8–14.4)
PLATELET # BLD: 235 K/UL (ref 138–453)
PLATELET ESTIMATE: ABNORMAL
PMV BLD AUTO: 10.8 FL (ref 8.1–13.5)
POC HCO3: 42.1 MMOL/L (ref 21–28)
POC O2 SATURATION: 95 % (ref 94–98)
POC PCO2 TEMP: ABNORMAL MM HG
POC PCO2: 53.7 MM HG (ref 35–48)
POC PH TEMP: ABNORMAL
POC PH: 7.5 (ref 7.35–7.45)
POC PO2 TEMP: ABNORMAL MM HG
POC PO2: 73.3 MM HG (ref 83–108)
POSITIVE BASE EXCESS, ART: 17 (ref 0–3)
POTASSIUM SERPL-SCNC: 3.9 MMOL/L (ref 3.7–5.3)
RBC # BLD: 3.42 M/UL (ref 3.95–5.11)
RBC # BLD: ABNORMAL 10*6/UL
READ BACK: YES
SAMPLE SITE: ABNORMAL
SEG NEUTROPHILS: 72 % (ref 36–66)
SEGMENTED NEUTROPHILS ABSOLUTE COUNT: 7.13 K/UL (ref 1.8–7.7)
SODIUM BLD-SCNC: 144 MMOL/L (ref 135–144)
TCO2 (CALC), ART: 44 MMOL/L (ref 22–29)
TOTAL PROTEIN: 5.6 G/DL (ref 6.4–8.3)
WBC # BLD: 9.9 K/UL (ref 3.5–11.3)
WBC # BLD: ABNORMAL 10*3/UL

## 2020-10-29 PROCEDURE — 6370000000 HC RX 637 (ALT 250 FOR IP): Performed by: STUDENT IN AN ORGANIZED HEALTH CARE EDUCATION/TRAINING PROGRAM

## 2020-10-29 PROCEDURE — 6360000002 HC RX W HCPCS: Performed by: INTERNAL MEDICINE

## 2020-10-29 PROCEDURE — 2000000000 HC ICU R&B

## 2020-10-29 PROCEDURE — 99291 CRITICAL CARE FIRST HOUR: CPT | Performed by: INTERNAL MEDICINE

## 2020-10-29 PROCEDURE — 6360000002 HC RX W HCPCS: Performed by: STUDENT IN AN ORGANIZED HEALTH CARE EDUCATION/TRAINING PROGRAM

## 2020-10-29 PROCEDURE — 2500000003 HC RX 250 WO HCPCS: Performed by: STUDENT IN AN ORGANIZED HEALTH CARE EDUCATION/TRAINING PROGRAM

## 2020-10-29 PROCEDURE — 80053 COMPREHEN METABOLIC PANEL: CPT

## 2020-10-29 PROCEDURE — 6370000000 HC RX 637 (ALT 250 FOR IP): Performed by: INTERNAL MEDICINE

## 2020-10-29 PROCEDURE — 2580000003 HC RX 258: Performed by: STUDENT IN AN ORGANIZED HEALTH CARE EDUCATION/TRAINING PROGRAM

## 2020-10-29 PROCEDURE — 94761 N-INVAS EAR/PLS OXIMETRY MLT: CPT

## 2020-10-29 PROCEDURE — 99233 SBSQ HOSP IP/OBS HIGH 50: CPT | Performed by: INTERNAL MEDICINE

## 2020-10-29 PROCEDURE — 82803 BLOOD GASES ANY COMBINATION: CPT

## 2020-10-29 PROCEDURE — 94640 AIRWAY INHALATION TREATMENT: CPT

## 2020-10-29 PROCEDURE — 94770 HC ETCO2 MONITOR DAILY: CPT

## 2020-10-29 PROCEDURE — 2700000000 HC OXYGEN THERAPY PER DAY

## 2020-10-29 PROCEDURE — 2580000003 HC RX 258

## 2020-10-29 PROCEDURE — 85025 COMPLETE CBC W/AUTO DIFF WBC: CPT

## 2020-10-29 PROCEDURE — 82947 ASSAY GLUCOSE BLOOD QUANT: CPT

## 2020-10-29 PROCEDURE — 2580000003 HC RX 258: Performed by: INTERNAL MEDICINE

## 2020-10-29 PROCEDURE — 94003 VENT MGMT INPAT SUBQ DAY: CPT

## 2020-10-29 RX ORDER — MIDODRINE HYDROCHLORIDE 5 MG/1
10 TABLET ORAL
Status: DISCONTINUED | OUTPATIENT
Start: 2020-10-29 | End: 2020-11-06 | Stop reason: HOSPADM

## 2020-10-29 RX ORDER — MIDODRINE HYDROCHLORIDE 2.5 MG/1
2.5 TABLET ORAL
Status: DISCONTINUED | OUTPATIENT
Start: 2020-10-29 | End: 2020-10-29

## 2020-10-29 RX ORDER — ACETAZOLAMIDE 500 MG/5ML
250 INJECTION, POWDER, LYOPHILIZED, FOR SOLUTION INTRAVENOUS EVERY 12 HOURS
Status: COMPLETED | OUTPATIENT
Start: 2020-10-29 | End: 2020-10-30

## 2020-10-29 RX ORDER — SPIRONOLACTONE 25 MG/1
25 TABLET ORAL DAILY
Status: DISCONTINUED | OUTPATIENT
Start: 2020-10-29 | End: 2020-11-06 | Stop reason: HOSPADM

## 2020-10-29 RX ADMIN — Medication 125 MCG/HR: at 19:08

## 2020-10-29 RX ADMIN — SODIUM CHLORIDE, PRESERVATIVE FREE 10 ML: 5 INJECTION INTRAVENOUS at 20:32

## 2020-10-29 RX ADMIN — METOPROLOL TARTRATE 12.5 MG: 25 TABLET ORAL at 20:32

## 2020-10-29 RX ADMIN — ASPIRIN 81 MG: 81 TABLET, CHEWABLE ORAL at 09:03

## 2020-10-29 RX ADMIN — IPRATROPIUM BROMIDE AND ALBUTEROL SULFATE 1 AMPULE: .5; 3 SOLUTION RESPIRATORY (INHALATION) at 08:19

## 2020-10-29 RX ADMIN — ACETAZOLAMIDE SODIUM 250 MG: 500 INJECTION, POWDER, LYOPHILIZED, FOR SOLUTION INTRAVENOUS at 12:28

## 2020-10-29 RX ADMIN — Medication 150 MCG/HR: at 04:53

## 2020-10-29 RX ADMIN — PREGABALIN 300 MG: 100 CAPSULE ORAL at 20:32

## 2020-10-29 RX ADMIN — PREGABALIN 300 MG: 100 CAPSULE ORAL at 09:03

## 2020-10-29 RX ADMIN — IPRATROPIUM BROMIDE AND ALBUTEROL SULFATE 1 AMPULE: .5; 3 SOLUTION RESPIRATORY (INHALATION) at 15:43

## 2020-10-29 RX ADMIN — HYDROCORTISONE SODIUM SUCCINATE 50 MG: 100 INJECTION, POWDER, FOR SOLUTION INTRAMUSCULAR; INTRAVENOUS at 08:55

## 2020-10-29 RX ADMIN — SODIUM CHLORIDE, PRESERVATIVE FREE 10 ML: 5 INJECTION INTRAVENOUS at 09:14

## 2020-10-29 RX ADMIN — SPIRONOLACTONE 25 MG: 25 TABLET ORAL at 12:52

## 2020-10-29 RX ADMIN — FAMOTIDINE 20 MG: 10 INJECTION INTRAVENOUS at 09:03

## 2020-10-29 RX ADMIN — ENOXAPARIN SODIUM 70 MG: 80 INJECTION SUBCUTANEOUS at 09:19

## 2020-10-29 RX ADMIN — AMIODARONE HYDROCHLORIDE 0.5 MG/MIN: 50 INJECTION, SOLUTION INTRAVENOUS at 04:32

## 2020-10-29 RX ADMIN — DOCUSATE SODIUM 100 MG: 50 LIQUID ORAL at 09:20

## 2020-10-29 RX ADMIN — HYDROCORTISONE SODIUM SUCCINATE 50 MG: 100 INJECTION, POWDER, FOR SOLUTION INTRAMUSCULAR; INTRAVENOUS at 15:37

## 2020-10-29 RX ADMIN — FAMOTIDINE 20 MG: 10 INJECTION INTRAVENOUS at 20:32

## 2020-10-29 RX ADMIN — DIGOXIN 250 MCG: 0.25 INJECTION INTRAMUSCULAR; INTRAVENOUS at 00:00

## 2020-10-29 RX ADMIN — IPRATROPIUM BROMIDE AND ALBUTEROL SULFATE 1 AMPULE: .5; 3 SOLUTION RESPIRATORY (INHALATION) at 02:59

## 2020-10-29 RX ADMIN — WATER 10 ML: 1 INJECTION INTRAMUSCULAR; INTRAVENOUS; SUBCUTANEOUS at 12:10

## 2020-10-29 RX ADMIN — Medication 4 MG/HR: at 10:19

## 2020-10-29 RX ADMIN — MIDODRINE HYDROCHLORIDE 10 MG: 5 TABLET ORAL at 12:16

## 2020-10-29 RX ADMIN — HYDROCORTISONE SODIUM SUCCINATE 50 MG: 100 INJECTION, POWDER, FOR SOLUTION INTRAMUSCULAR; INTRAVENOUS at 00:00

## 2020-10-29 RX ADMIN — Medication 125 MCG/HR: at 11:12

## 2020-10-29 RX ADMIN — CEFTRIAXONE SODIUM 2 G: 2 INJECTION, POWDER, FOR SOLUTION INTRAMUSCULAR; INTRAVENOUS at 11:22

## 2020-10-29 RX ADMIN — ENOXAPARIN SODIUM 70 MG: 80 INJECTION SUBCUTANEOUS at 21:00

## 2020-10-29 RX ADMIN — METOPROLOL TARTRATE 12.5 MG: 25 TABLET ORAL at 08:58

## 2020-10-29 RX ADMIN — IPRATROPIUM BROMIDE AND ALBUTEROL SULFATE 1 AMPULE: .5; 3 SOLUTION RESPIRATORY (INHALATION) at 20:02

## 2020-10-29 RX ADMIN — POTASSIUM BICARBONATE 40 MEQ: 782 TABLET, EFFERVESCENT ORAL at 09:03

## 2020-10-29 RX ADMIN — AMIODARONE HYDROCHLORIDE 0.5 MG/MIN: 50 INJECTION, SOLUTION INTRAVENOUS at 19:39

## 2020-10-29 RX ADMIN — POLYETHYLENE GLYCOL 3350 17 G: 17 POWDER, FOR SOLUTION ORAL at 09:17

## 2020-10-29 RX ADMIN — Medication 5 MG/HR: at 23:22

## 2020-10-29 RX ADMIN — LEVOTHYROXINE SODIUM 150 MCG: 75 TABLET ORAL at 08:51

## 2020-10-29 ASSESSMENT — PULMONARY FUNCTION TESTS
PIF_VALUE: 23
PIF_VALUE: 13
PEFR_L/MIN: 22
PIF_VALUE: 20
PIF_VALUE: 23
PIF_VALUE: 23
PIF_VALUE: 25
PIF_VALUE: 14

## 2020-10-29 ASSESSMENT — PAIN SCALES - GENERAL
PAINLEVEL_OUTOF10: 0

## 2020-10-29 NOTE — PROGRESS NOTES
[facility]   Progress Note                   Date:   10/29/2020  Patient name: Dennis Acevedo  Date of admission:  10/22/2020  6:58 PM  MRN:   [id number]  YOB: 1945  PCP: Christal Cobb MD    Reason for Admission:     Subjective:      Intubated   UOP 4365 ml over 24 hrs    Didn't tolerated weaning    Intake/Output Summary (Last 24 hours) at 10/29/2020 1044  Last data filed at 10/29/2020 1017  Gross per 24 hour   Intake 2256.37 ml   Output 3235 ml   Net -978.63 ml       Medications:   Scheduled Meds:   midodrine  2.5 mg Oral TID WC    furosemide  40 mg Intravenous BID    metoprolol tartrate  12.5 mg Oral BID    docusate  100 mg Oral Daily    polyethylene glycol  17 g Oral Daily    cefTRIAXone (ROCEPHIN) IV  2 g Intravenous Q24H    enoxaparin  1 mg/kg Subcutaneous BID    sodium chloride flush  10 mL Intravenous 2 times per day    aspirin  81 mg Oral Daily    hydrocortisone sodium succinate PF  50 mg Intravenous Q8H    ipratropium-albuterol  1 ampule Inhalation Q6H    famotidine (PEPCID) injection  20 mg Intravenous BID    anesthetic and narcotic custom mixture   Intrathecal Once    levothyroxine  150 mcg Oral Daily    pregabalin  300 mg Oral BID    sodium chloride flush  10 mL Intravenous 2 times per day     Continuous Infusions:   amiodarone 0.5 mg/min (10/29/20 0432)    norepinephrine 4 mcg/min (10/29/20 1029)    fentaNYL 150 mcg/hr (10/29/20 0453)    cisatracurium (NIMBEX) infusion Stopped (10/28/20 1102)    midazolam 4 mg/hr (10/29/20 1019)     CBC:   Recent Labs     10/27/20  0455 10/28/20  0540 10/29/20  0442   WBC 9.7 6.5 9.9   HGB 10.9* 10.6* 10.4*    179 235     BMP:    Recent Labs     10/27/20  0455 10/27/20  1817 10/28/20  0540 10/29/20  0442   * 143 146* 144   K 3.5* 3.9 4.3 3.9   *  --  106 100   CO2 27  --  33* 38*   BUN 14  --  19 24*   CREATININE 0.44*  --  0.37* 0.47*   GLUCOSE 154*  --  134* 126*     Hepatic:   Recent Labs 10/27/20  0455 10/28/20  0540 10/29/20  0442   AST 13 10 15   ALT 9 9 14   BILITOT 0.25* 0.16* 0.29*   ALKPHOS 87 69 68     Troponin: No results for input(s): TROPONINI in the last 72 hours. BNP: No results for input(s): BNP in the last 72 hours. Lipids: No results for input(s): CHOL, HDL in the last 72 hours. Invalid input(s): LDLCALCU  INR: No results for input(s): INR in the last 72 hours. Objective:   Vitals: /64   Pulse 78   Temp 99 °F (37.2 °C) (Oral)   Resp 22   Ht 5' 4\" (1.626 m)   Wt 164 lb (74.4 kg)   SpO2 95%   BMI 28.15 kg/m²   Constitutional and General Appearance: intubated  Respiratory:  · Clear to auscultation bilaterally, with equal air entry  Cardiovascular:  · S1, s2, rrr, no pain on palpation of anterior chest wall. Abdomen:   · No masses or tenderness, soft abdomen  · Bowel sounds present  Extremities:  · No le edema, peripheral pulse bl le present 2 +  Integumentum:   Intact with no rashes noted    EKG:         Atrial flutter with variable A-V block  Inferior infarct (cited on or before 23-OCT-2020)  Anterior infarct (cited on or before 22-OCT-2020)  T wave abnormality, consider lateral ischemia  Abnormal ECG  When compared with ECG of 23-OCT-2020 19:46,  Atrial flutter has replaced Atrial fibrillation  Nonspecific T wave abnormality now evident in Inferior leads  T wave inversion now evident in Anterior leads        ECHO:10/22  Left ventricle is normal in size. Global left ventricular systolic function  is severely reduced. Estimated ejection fraction is 25-30 % . Calculated EF via heart model is 30 %. The apex appears akinetic. Cannot exclude an apical thrombus, consider  Definity. Mild mitral regurgitation. Mild to moderate tricuspid regurgitation. Estimated right ventricular  systolic pressure is 35 mmHg.   IVC dilated but unable to assess respiratory collapse due to patient on  ventilator.     Cardiac Angiography:10/26        LMCA: Mild irregularities 10-20%.     LAD: Mild irregularities 10-20%.     LCx: Mild irregularities 10-20%.     RCA: Mild irregularities 10-20%.     Coronary Tree      Dominance: Right     Estimated Blood Loss: 10 mL     Conclusions:      Mild cAD.        Recommendations:      Medical therapy for CAD and dilated cardiomyopathy.   Reassess EF in 3 months      Assessment / Acute Cardiac Problems:   1. Acute hypoxic respiratory failure possibly secondary to bilateral lobe pneumonia, possibly secondary to pulmonary edema  2. Community-acquired pneumonia secondary to lactose fermenting gram-negative rods  3. Possible cardiogenic shock  4. New onset systolic dysfunction with EF of 25 to 30% (Takotsubo cardiomyopathy)  5. New onset A. fib/atrial flutter with RVR now with normal sinus rhythm  6. Another episode of A fib on 10/28  Restarted back on amio gtt  7. Rheumatoid arthritis on Plaquenil  8. No cardiac evaluation in the past      Plan of Treatment:     1. Continue amiodarone gtt for A fib today ( NSR now)  2. Continue therapeutic Lovenox for anticoagulation for A fib  3. Continue IV lasix 40 mg bid for diuresis  4. Continue lopressor 12.5 mg bd for nonischemic cardiomyopathy, will add aldactone 25 mg daily  5. Will add ACE as tolerated  6. Need to reassess EF in 3 months for evaluation of AICD  7. Discharge on life vest  8. Will follow      Will discuss with rounding attending Leeanne Prieto  for final recommendations. Mckay Anderson MD,  Internal Medicine Resident, PGY-3,   [address]  10/29/2020,12:06 PM     I performed a history and physical examination of the patient and discussed management with the resident. I reviewed the residents note and agree with the documented findings and plan of care. Any areas of disagreement are noted on the chart. I was personally present for the key portions of any procedures.  I have documented in the chart those procedures where I was not present during the coffey portions. I have personally evaluated this patient and have completed at least one if not all key elements of the E/M (history, physical exam, and MDM). Additional findings are as noted.     Bhupendra Damico MD

## 2020-10-29 NOTE — FLOWSHEET NOTE
Italo soft wrist restraints have been removed. Lois Fong has not made any attempt to reach for any lines/tubes.  She is sedated d/t increased work of breathing,prolonged exp phase, and Rapid RRR

## 2020-10-29 NOTE — PLAN OF CARE
Problem: Falls - Risk of:  Goal: Will remain free from falls  Description: Will remain free from falls  10/29/2020 0532 by Dino Galan RN  Outcome: Not Met This Shift  10/28/2020 1821 by Eddie Cortés RN  Outcome: Met This Shift  Goal: Absence of physical injury  Description: Absence of physical injury  10/29/2020 0532 by Dino Galan RN  Outcome: Not Met This Shift  10/28/2020 1821 by Eddie Cortés RN  Outcome: Met This Shift     Problem: Restraint Use - Nonviolent/Non-Self-Destructive Behavior:  Goal: Absence of restraint indications  Description: Absence of restraint indications  10/29/2020 0532 by Dino Galan RN  Outcome: Not Met This Shift  10/28/2020 1821 by Eddie Cortés RN  Outcome: Not Met This Shift  Goal: Absence of restraint-related injury  Description: Absence of restraint-related injury  10/29/2020 0532 by Dino Galan RN  Outcome: Ongoing  10/28/2020 1821 by Eddie Cortés RN  Outcome: Met This Shift     Problem: Confusion - Acute:  Goal: Absence of continued neurological deterioration signs and symptoms  Description: Absence of continued neurological deterioration signs and symptoms  10/29/2020 0532 by Dino Galan RN  Outcome: Ongoing  10/28/2020 1821 by Eddie Cortés RN  Outcome: Not Met This Shift  Goal: Mental status will be restored to baseline  Description: Mental status will be restored to baseline  10/29/2020 0532 by Dino Galan RN  Outcome: Ongoing  10/28/2020 1821 by Eddie Cortés RN  Outcome: Not Met This Shift     Problem: Injury - Risk of, Physical Injury:  Goal: Will remain free from falls  Description: Will remain free from falls  10/29/2020 0532 by Dino Galan RN  Outcome: Not Met This Shift  10/28/2020 1821 by Eddie Cortés RN  Outcome: Met This Shift  Goal: Absence of physical injury  Description: Absence of physical injury  10/29/2020 0532 by Dino Galan RN  Outcome: Not Met This Shift  10/28/2020 1821 by Eddie Cortés RN  Outcome: Met This Shift

## 2020-10-29 NOTE — PLAN OF CARE
Problem: Restraint Use - Nonviolent/Non-Self-Destructive Behavior:  Goal: Absence of restraint indications  Description: Absence of restraint indications  10/29/2020 1948 by Hugo Vallejo RN  Outcome: Completed not in use since 1500  10/29/2020 1307 by Hugo Vallejo RN  Outcome: Not Met This Shift  Goal: Absence of restraint-related injury  Description: Absence of restraint-related injury  10/29/2020 1948 by Hugo Vallejo RN  Outcome: Completed no injury  10/29/2020 1307 by Hugo Vallejo RN  Outcome: Ongoing

## 2020-10-29 NOTE — PROGRESS NOTES
INTENSIVE CARE UNIT  Resident Physician Progress Note    Patient - Michael Sanchez  Date of Admission -  10/22/2020  6:58 PM  Date of Evaluation -  10/29/2020  Room and Bed Number -  0126/0126-01   Hospital Day - 7  CC - RESPIRATORY FAILURE    SUBJECTIVE:     OVERNIGHT EVENTS:      No acute events overnight. Pt has been afebrile. Patient is currently on sedation holiday and the paralytic, Nimbex, has been turned off. Pt was hemodynamically stable, with no A-fib w/ RVR. TF at goal and tolerating. Patient still making good urine output. Patient is waking up in moving all extremities however is noncommunicative, and withdraws from pain. TODAY:     AWAKE & FOLLOWING COMMANDS: [x]   No  []   Yes                           SECRETIONS                 Amount:  []   Small []   Moderate []   Large [x]   None        Color: []   White []   Colored []   Bloody                         SEDATION:                 RAAS Score: [x]   +1 to -1 []   -2 []   -3 []   -4        Sedation Agent: []   Propofol gtt [x]   Versed gtt  []   Ativan gtt  []   No Sedation        Paralytic Agent: []   Precedex []   Norcuron []   Nimbex [x]  None                       VASOPRESSORS:  []   No  [x]   Yes            Vasopressor Agent [x]   Levophed []   Dopamine []   Vasopressin []   Dobutamine  []   Phenylephrine  []   Epinephrine     OBJECTIVE:   VITAL SIGNS:  Patient Vitals for the past 8 hrs:   Temp Temp src Pulse Resp SpO2   10/29/20 0400 100.2 °F (37.9 °C) Oral -- -- --   10/29/20 0259 -- -- 77 22 94 %   10/29/20 0000 -- -- -- (!) 37 --       Last Body weight:   Wt Readings from Last 3 Encounters:   10/27/20 164 lb (74.4 kg)   10/22/20 164 lb (74.4 kg)   20 164 lb 9.6 oz (74.7 kg)       Body Mass Index : Body mass index is 28.15 kg/m². Tmax over 24 hours: Temp (24hrs), Av.2 °F (37.9 °C), Min:98.8 °F (37.1 °C), Max:101.3 °F (38.5 °C)      Ins/Outs:    In: 013 [I.V.:267; NG/GT:277]  Out: 2250 [Urine:2250]    PHYSICAL [] Nasal Cannula [] Room Air  VENT SETTINGS (Comprehensive) (if applicable):  Vent Information  $Ventilation: $Subsequent Day  Skin Assessment: Clean, dry, & intact  Equipment ID: [id number]  Equipment Changed: Expiratory Filter  Vent Type: Servo i  Vent Mode: [facility]  Vt Ordered: 480 mL  Rate Set: 22 bmp  FiO2 : 40 %  SpO2: 94 %  SpO2/FiO2 ratio: 235  Sensitivity: 2  PEEP/CPAP: 8  I Time/ I Time %: 0.8 s  Humidification Source: Heated wire  Humidification Temp: 37  Humidification Temp Measured: 37  Circuit Condensation: Drained  Nitric Oxide/Epoprostenol In Use?: No  Mask Type: Full face mask  Mask Size: Small  Additional Respiratory  Assessments  Pulse: 77  Resp: 22  SpO2: 94 %  $End Tidal CO2: 36  pCO2 (TCOM, mmHg): 42 mmHg  Position: Semi-Mcclendon's  Humidification Source: Heated wire  Humidification Temp: 37  Circuit Condensation: Drained  Oral Care Completed?: Yes  Oral Care: Mouth swabbed, Mouth moisturizer, Mouth suctioned  Subglottic Suction Done?: No  Cuff Pressure (cm H2O): (mov)  Lab Results   Component Value Date    MODE [facility] 10/29/2020       ABGs:   Arterial Blood Gas result:  pH 7.502; pCO2 53.7; pO2 73.3.; HCO3 42.1; BE 17; %O2 Sat 95; P/F 183    DATA:  Complete Blood Count:   Recent Labs     10/27/20  0455 10/28/20  0540 10/29/20  0442   WBC 9.7 6.5 9.9   RBC 3.59* 3.49* 3.42*   HGB 10.9* 10.6* 10.4*   HCT 35.1* 34.8* 33.5*   MCV 97.8 99.7 98.0   MCH 30.4 30.4 30.4   MCHC 31.1 30.5 31.0   RDW 14.6* 14.9* 14.5*    179 235   MPV 11.0 10.9 10.8        Last 3 Blood Glucose:   Recent Labs     10/27/20  0455 10/28/20  0540 10/29/20  0442   GLUCOSE 154* 134* 126*        PT/INR:    Lab Results   Component Value Date    PROTIME 12.7 10/22/2020    INR 1.0 10/22/2020     PTT:    Lab Results   Component Value Date    APTT 57.1 10/27/2020       Basic Metabolic Profile:   Recent Labs     10/27/20  0455 10/27/20  1817 10/28/20  0540 10/29/20  0442   * 143 146* 144   K 3.5* 3.9 4.3 3.9   *  --  106 100   CO2 27  --  33* 38*   BUN 14  --  19 24*   CREATININE 0.44*  --  0.37* 0.47*   GLUCOSE 154*  --  134* 126*       Liver Function:  Recent Labs     10/29/20  0442   PROT 5.6*   LABALBU 2.6*   ALT 14   AST 15   ALKPHOS 68   BILITOT 0.29*       Magnesium:   Lab Results   Component Value Date    MG 2.0 10/24/2020    MG 2.0 10/23/2020    MG 2.2 10/22/2020     Phosphorus: No results found for: PHOS  Ionized Calcium:   Lab Results   Component Value Date    CAION 1.17 10/24/2020    CAION 1.12 10/23/2020        Urinalysis:   Lab Results   Component Value Date    NITRU NEGATIVE 10/22/2020    COLORU YELLOW 10/22/2020    PHUR 6.0 10/22/2020    WBCUA 0 TO 2 08/28/2019    RBCUA 0 TO 2 08/28/2019    MUCUS 2+ 08/28/2019    TRICHOMONAS NOT REPORTED 08/28/2019    YEAST NOT REPORTED 08/28/2019    BACTERIA 1+ 08/28/2019    SPECGRAV >1.030 10/22/2020    LEUKOCYTESUR NEGATIVE 10/22/2020    UROBILINOGEN Normal 10/22/2020    BILIRUBINUR NEGATIVE 10/22/2020    GLUCOSEU NEGATIVE 10/22/2020    KETUA NEGATIVE 10/22/2020    AMORPHOUS NOT REPORTED 08/28/2019       HgBA1c:  No results found for: LABA1C  TSH:    Lab Results   Component Value Date    TSH 0.69 10/24/2020     Lactic Acid:   Lab Results   Component Value Date    LACTA 5.0 10/22/2020    LACTA 0.5 08/28/2019    LACTA 2.0 10/05/2018      Troponin: No results for input(s): TROPONINI in the last 72 hours. Microbiology:  Blood Culture: No growth x5 days  Sputum Culture:  Klebsiella pneumoniae, sensitive to Ceftriaxone    Radiology/Imaging:  XR CHEST PORTABLE   Final Result   Multifocal interstitial and consolidative infiltrates seen throughout the   lungs bilaterally, with worsening consolidation in the right lung base. XR CHEST PORTABLE   Final Result   1.  Endotracheal tube terminates at the level of the alejandro and should be   pulled back approximately 2-3 cm.   2. Redemonstration of coarse, diffuse bilateral interstitial opacities with   slightly increased superimposed hazy airspace opacity, possibly edema or   worsening pneumonia. XR CHEST PORTABLE   Final Result   Coarse bilateral airspace opacities within the upper and lower lobes, not   significantly changed, suggesting a multi lobar pneumonia. XR CHEST PORTABLE   Final Result   Partial clearing of bilateral lung opacities. XR CHEST PORTABLE   Final Result   Increased bilateral lung opacities. XR CHEST PORTABLE   Final Result   Bilateral lung opacities likely pneumonia. Enteric tube with the tip within the proximal stomach. Consider slightly   advancing. XR ABDOMEN FOR NG/OG/NE TUBE PLACEMENT   Final Result   Life-support devices as above. Similar-appearing diffuse bilateral heterogeneous opacities and multifocal   consolidations. XR CHEST PORTABLE   Final Result   Life-support devices as above. Similar-appearing diffuse bilateral heterogeneous opacities and multifocal   consolidations. XR CHEST PORTABLE   Final Result   No significant interval change in multifocal bilateral consolidation, most   concerning for pneumonia. XR CHEST PORTABLE   Final Result   No significant change in the multifocal airspace opacities worrisome for   multifocal pneumonia.              ASSESSMENT:     Patient Active Problem List    Diagnosis Date Noted    Acute metabolic encephalopathy 26/94/2572     Priority: High     Class: Acute    Acute respiratory failure with hypoxia and hypercapnia (HCC)     Elevated troponin     Severe sepsis ([address] 10/22/2020    COPD exacerbation ([address] 10/22/2020    Sepsis due to pneumonia ([address] 08/16/2020    Lactic acidosis 08/16/2020    Septic shock ([address] 08/16/2020    Status post surgery 12/20/2018    Pneumonia of both lower lobes due to infectious organism 10/05/2018    Hypothyroidism 10/05/2018    Major depressive disorder 10/05/2018    Chronic midline low back pain without sciatica 10/05/2018    Iron deficiency anemia 10/05/2018        PLAN:      WEAN PER PROTOCOL:  [x]   No  []   Yes []   N/A                         ICU PROPHYLAXIS:                Stress ulcer:  [x]   PPI Agent []   N0Seukm []   Sucralfate []   Other []   None      VTE:  []   Enoxaparin []   SQ Heparin []   EPC Cuffs [x]  HEPARIN GTT                       NUTRITION:   []  NPO [x]   TF:  []   TPN []   PO                       HOME MEDS RECONCILED:  []   No  [x]   Yes                           CONSULTATION NEEDED:  [x]   No  []   Yes                           FAMILY UPDATED:  []   No  [x]   Yes                           TRANSFER OUT OF ICU:  [x]   No  []   Yes             PLAN/MEDICAL DECISION MAKIN. Acute hypoxic and hypercarbic respiratory failure secondary to COPD exacerbation, multifocal pneumonia and CHF exacerbation  - Continues to be intubated and sedated, paralyzed, wean off as tolerated. - Continue antibiotics and diuretics. 2. Multifocal pneumonia  - Continue rocephin, d/c azithromycin.    - Follow up repeat CXR, appreciate ID recommendations. -CXR have been revealing worsening opacities concerning for pneumonia vs pulmonary edema      3. New onset congestive heart failure secondary to DCMP s/p cardiac cath  - EF 30%, reduced from 50% in 2019, Cardiology on board. - S/P cardiac cath 10/26 - mild CAD. - Continue heparin gtt. - Continue aspirin 81 mg daily.   - Given 1 dose of Lasix 40 mg on 10/28/2020  - Life vest on discharge     4. New onset atrial fibrillation with concern for LV thrombus on TTE  - Continue p.o. amiodarone 200 mg  BID of 2/5 days, and then switch to Daily.  - Continue Heparin gtt  - Currently in NSR, follow up Cardiology recommendations. 5. Septic shock secondary to multifocal pneumonia vs cardiogenic shock -   - Continue rocephin 1 g q24, f/u ID recommendations. - Wean off steroids, 50 mg daily.      6. Acute metabolic alkalosis 2/2 diuretics  - Will hold Lasix  - Continue to monitor       - Wean Levophed  - Start Midodrine 10 mg TID    6. Hypothyroidism  - Continue Synthroid 150 mcg daily. DVT prophylaxis - On heparin gtt. GI prophylaxis - Pepcid 20 mg BID     Disposition - To remain in ICU    CODE STATUS: Full Code    DISPOSITION:  [x] To remain ICU    Ole Just, DO  Emergency Medicine Resident, PGY2  Critical Care Service   10/29/20 7:43 AM  Attending Physician Statement  I have discussed the care of Sergio Branch, including pertinent history and exam findings,  with the resident. I have seen and examined the patient and the key elements of all parts of the encounter have been performed by me. I agree with the assessment, plan and orders as documented by the resident with additions . Start midodrine   Wean Levophed keep MAP greater than 65  We will give 3 doses of Diamox  Minimize sedation    Total critical care time caring for this patient with life threatening, unstable organ failure, including direct patient contact, management of life support systems, review of data including imaging and labs, discussions with other team members and physicians at least 27   Min so far today, excluding procedures. Treatment plan Discussed with nursing staff in detail , all questions answered . Electronically signed by Guillermina Oliva MD on   10/29/20 at 4:48 PM EDT    Please note that this chart was generated using voice recognition Dragon dictation software. Although every effort was made to ensure the accuracy of this automated transcription, some errors in transcription may have occurred.

## 2020-10-29 NOTE — PROGRESS NOTES
[facility] - Progress Note    Today's Date and Time: 10/29/2020, 8:29 AM    Impression :   · Septic Shock   · Bandemia  · CHF with elevated ProBNP  · Pulmonary edema  · Superimposed pneumonia; sputum cultures 10/25/20 grew Klebsiella Pneumoniae moderate growth  · COPD  · Elevated Troponins  · Hypothyroidism    Recommendations:   · Rocephin 2 gm IV q 24 hr (start date 10/23)  · D/C Zithromax 500 mg IV q 24 hr (start date 10/23. Stop date 10-27-20)  Medical Decision Making/Summary/Discussion:   · Patient with CHF  · Rapid deterioration with acute hypoxia and hypercarbia requiring intubation  · CXR with rapid fluid shifts suggesting pulmonary edema. Very high ProBNP  · Can not exclude associated pneumonia at this stage. Pro calcitonin elevated  Infection Control Recommendations   · [place] Precautions    Antimicrobial Stewardship Recommendations     · Simplification of therapy  · Targeted therapy  Coordination of Outpatient Care:   · Estimated Length of IV antimicrobials:TBD  · Patient will need Midline Catheter Insertion: No  · Patient will need PICC line Insertion:Yes  · Patient will need: Home IV , [place],  SNF,  LTAC: TBD  · Patient will need outpatient wound care:No    Chief complaint/reason for consultation:   · Sepsis secondary to pneumonia      History of Present Illness:   Ashley Valle is a 77y.o.-year-old  female who was initially admitted on 10/22/2020. Patient seen at the request of Dr. Pizarro Age:    History was obtained from chart review and unobtainable from patient due to mental status. She has a history of hypothyroidism, COPD, iron deficiency anemia, presented to [place] [address] emergency department on 10/22 for difficulties breathing. Found to have pneumonia, and started on the sepsis protocol. At [facility], she is unable to provide much history as she is somnolent, but does follow commands.  per EMR review became on the morning of 10/22, prompting her emergency department visit. There they performed a chest x-ray as well as CT scan which was negative for any pulmonary embolism but did show bilateral infiltrates. Found to have a lactic acidosis as well, and started on Rocephin and azithromycin. Initially hypercapnic, her mentation improved on BiPAP on 10/22. CURRENT EVALUATION: 10/29/20     Patient evaluated and examined in the ICU. Febrile. T Max of 101.3  Tachycardia improved. Patient off of Pressors. Intubated, sedated, on ventilator. Not alert, awake, not following commands. off of paralytics. Switched to Amiodarone drip again. Patient reverted back to A.fib with RVR yesterday. CXR:  · 10/24 increased bilat infiltrates  · 10/26 shows bilateral upper and lower lobe infiltrates unchanged from previous x-ray. · 10-27-20: shows coarse diffuse bilateral infiltrates with superimposed haziness, worse than yesterday. Patient shows fluid shifts. · 10-28-20: Multifocal interstitial and consolidative infiltrates seen throughout the lungs bilaterally, with worsening consolidation in the right lung base. NSTEMI, off of heparin drip, new onset reduced systolic dysfunction, EF 21%, apical hypokinesis. Cardiac cath done. Mild CAD. Recommend medical therapy for CAD and dilated cardiomyopathy. Started on IV Lasix. Life vest on discharge. Cardiology following. Labs:    BUN: 17 > 16 > 15>14>19>24  Creatinine: 0.56>0.44>0.37>0.47    WBC: 9.4 > 16.8 > 13.7 >14.2>9.7>6.5>9.9  Hgb: 12 >10.5 >11.3>10.9>10.6  Platelet: 804 >066 >553>582>477>284    Lactic acid 6.2 > 2.9 > 2.0>1.7    Cultures:  Urine:  ·   Blood:  · 10/22/20: No growth   Sputum :  · 10/25/20: Klebsiella pneumoniae moderate growth. Wound:     CSF         Discussed with patient, RN,     I have personally reviewed the past medical history, past surgical history, medications, social history, and family history, and I have updated the database accordingly.   Past Medical History:     Past Medical History:   Diagnosis Date    COPD (chronic obstructive pulmonary disease) ([address]     Depression     GERD (gastroesophageal reflux disease)     Osteoporosis        Past Surgical  History:     Past Surgical History:   Procedure Laterality Date    BACK SURGERY      BREAST SURGERY      CARPAL TUNNEL RELEASE      FRACTURE SURGERY Left 12/20/2018    ORIF wrist    HYSTERECTOMY      JOINT REPLACEMENT      right knee    JOINT REPLACEMENT      WRIST FRACTURE SURGERY Left 12/20/2018    WRIST OPEN REDUCTION INTERNAL FIXATION-DISTAL RADIUS performed by Marjorie Cabezas MD at [address]       Medications:      furosemide  40 mg Intravenous BID    metoprolol tartrate  12.5 mg Oral BID    docusate  100 mg Oral Daily    polyethylene glycol  17 g Oral Daily    potassium bicarb-citric acid  40 mEq Oral Daily    cefTRIAXone (ROCEPHIN) IV  2 g Intravenous Q24H    enoxaparin  1 mg/kg Subcutaneous BID    sodium chloride flush  10 mL Intravenous 2 times per day    aspirin  81 mg Oral Daily    hydrocortisone sodium succinate PF  50 mg Intravenous Q8H    ipratropium-albuterol  1 ampule Inhalation Q6H    famotidine (PEPCID) injection  20 mg Intravenous BID    anesthetic and narcotic custom mixture   Intrathecal Once    levothyroxine  150 mcg Oral Daily    pregabalin  300 mg Oral BID    sodium chloride flush  10 mL Intravenous 2 times per day       Social History:     Social History     Socioeconomic History    Marital status:      Spouse name: Not on file    Number of children: Not on file    Years of education: Not on file    Highest education level: Not on file   Occupational History    Not on file   Social Needs    Financial resource strain: Not on file    Food insecurity     Worry: Not on file     Inability: Not on file    Transportation needs     Medical: Not on file     Non-medical: Not on file   Tobacco Use    Smoking status: Former Smoker     Last attempt to distress. Breath sounds: Rales present. Abdominal:      General: There is no distension. Palpations: Abdomen is soft. Tenderness: There is no abdominal tenderness. Genitourinary:     Comments: Urine maral  R fem line in good condition  Musculoskeletal:         General: No swelling or tenderness. Skin:     Coloration: Skin is not jaundiced or pale. Neurological:      Comments: Sedated    Psychiatric:      Comments: Calm on the vent sedated          Medical Decision Making -Laboratory:   I have independently reviewed/ordered the following labs:    CBC with Differential:   Recent Labs     10/28/20  0540 10/29/20  0442   WBC 6.5 9.9   HGB 10.6* 10.4*   HCT 34.8* 33.5*    235   LYMPHOPCT 28 19*   MONOPCT 15* 7     BMP:   Recent Labs     10/28/20  0540 10/29/20  0442   * 144   K 4.3 3.9    100   CO2 33* 38*   BUN 19 24*   CREATININE 0.37* 0.47*     Hepatic Function Panel:   Recent Labs     10/28/20  0540 10/29/20  0442   PROT 5.6* 5.6*   LABALBU 2.6* 2.6*   BILITOT 0.16* 0.29*   ALKPHOS 69 68   ALT 9 14   AST 10 15     No results for input(s): RPR in the last 72 hours. No results for input(s): HIV in the last 72 hours. No results for input(s): BC in the last 72 hours. Lab Results   Component Value Date    MUCUS 2+ 08/28/2019    RBC 3.42 10/29/2020    TRICHOMONAS NOT REPORTED 08/28/2019    WBC 9.9 10/29/2020    YEAST NOT REPORTED 08/28/2019    TURBIDITY CLEAR 10/22/2020     Lab Results   Component Value Date    CREATININE 0.47 10/29/2020    GLUCOSE 126 10/29/2020       Medical Decision Making-Imaging:   10 -28 -20:        10-27-20        10/23/20:            Amna Moise MD   PGY1, Internal Medicine    ATTESTATION:    I have discussed the case, including pertinent history and exam findings with the residents and students. I have seen and examined the patient and the key elements of the encounter have been performed by me.  I was present when the student obtained his information or examined the patient. I have reviewed the laboratory data, other diagnostic studies and discussed them with the residents. I have updated the medical record where necessary. I agree with the assessment, plan and orders as documented by the resident/ student.     Ayanna Kunz MD.      Ayanna Kunz MD.

## 2020-10-29 NOTE — PLAN OF CARE
Problem: Restraint Use - Nonviolent/Non-Self-Destructive Behavior:  Goal: Absence of restraint-related injury  Description: Absence of restraint-related injury  10/29/2020 0532 by Micheal Ordonez RN  Outcome: Ongoing     Problem: Confusion - Acute:  Goal: Absence of continued neurological deterioration signs and symptoms  Description: Absence of continued neurological deterioration signs and symptoms  10/29/2020 0532 by Micheal Ordonez RN  Outcome: Ongoing  Goal: Mental status will be restored to baseline  Description: Mental status will be restored to baseline  10/29/2020 0532 by Micheal Ordonez RN  Outcome: Ongoing     Problem: Sleep Pattern Disturbance:  Goal: Appears well-rested  Description: Appears well-rested  10/29/2020 0532 by Micheal Ordonez RN  Outcome: Ongoing

## 2020-10-30 ENCOUNTER — APPOINTMENT (OUTPATIENT)
Dept: GENERAL RADIOLOGY | Age: 75
DRG: 870 | End: 2020-10-30
Attending: INTERNAL MEDICINE
Payer: MEDICARE

## 2020-10-30 LAB
-: NORMAL
ABSOLUTE EOS #: <0.03 K/UL (ref 0–0.44)
ABSOLUTE IMMATURE GRANULOCYTE: 0.16 K/UL (ref 0–0.3)
ABSOLUTE LYMPH #: 0.91 K/UL (ref 1.1–3.7)
ABSOLUTE MONO #: 0.5 K/UL (ref 0.1–1.2)
ALLEN TEST: ABNORMAL
AMORPHOUS: NORMAL
ANION GAP SERPL CALCULATED.3IONS-SCNC: 9 MMOL/L (ref 9–17)
BACTERIA: NORMAL
BASOPHILS # BLD: 0 % (ref 0–2)
BASOPHILS ABSOLUTE: <0.03 K/UL (ref 0–0.2)
BILIRUBIN URINE: NEGATIVE
BUN BLDV-MCNC: 23 MG/DL (ref 8–23)
BUN/CREAT BLD: ABNORMAL (ref 9–20)
CALCIUM SERPL-MCNC: 9.3 MG/DL (ref 8.6–10.4)
CASTS UA: NORMAL /LPF (ref 0–8)
CHLORIDE BLD-SCNC: 104 MMOL/L (ref 98–107)
CO2: 29 MMOL/L (ref 20–31)
COLOR: YELLOW
COMMENT UA: ABNORMAL
CREAT SERPL-MCNC: 0.52 MG/DL (ref 0.5–0.9)
CRYSTALS, UA: NORMAL /HPF
DIFFERENTIAL TYPE: ABNORMAL
EOSINOPHILS RELATIVE PERCENT: 0 % (ref 1–4)
EPITHELIAL CELLS UA: NORMAL /HPF (ref 0–5)
FIO2: 40
GFR AFRICAN AMERICAN: >60 ML/MIN
GFR NON-AFRICAN AMERICAN: >60 ML/MIN
GFR SERPL CREATININE-BSD FRML MDRD: ABNORMAL ML/MIN/{1.73_M2}
GFR SERPL CREATININE-BSD FRML MDRD: ABNORMAL ML/MIN/{1.73_M2}
GLUCOSE BLD-MCNC: 148 MG/DL (ref 70–99)
GLUCOSE BLD-MCNC: 169 MG/DL (ref 74–100)
GLUCOSE URINE: NEGATIVE
HCT VFR BLD CALC: 33.8 % (ref 36.3–47.1)
HEMOGLOBIN: 10.4 G/DL (ref 11.9–15.1)
IMMATURE GRANULOCYTES: 2 %
KETONES, URINE: NEGATIVE
LEUKOCYTE ESTERASE, URINE: NEGATIVE
LYMPHOCYTES # BLD: 11 % (ref 24–43)
MAGNESIUM: 2.4 MG/DL (ref 1.6–2.6)
MCH RBC QN AUTO: 30 PG (ref 25.2–33.5)
MCHC RBC AUTO-ENTMCNC: 30.8 G/DL (ref 28.4–34.8)
MCV RBC AUTO: 97.4 FL (ref 82.6–102.9)
MODE: ABNORMAL
MONOCYTES # BLD: 6 % (ref 3–12)
MUCUS: NORMAL
NEGATIVE BASE EXCESS, ART: ABNORMAL (ref 0–2)
NITRITE, URINE: NEGATIVE
NRBC AUTOMATED: 0 PER 100 WBC
O2 DEVICE/FLOW/%: ABNORMAL
OTHER OBSERVATIONS UA: NORMAL
PATIENT TEMP: ABNORMAL
PDW BLD-RTO: 14.2 % (ref 11.8–14.4)
PH UA: 7.5 (ref 5–8)
PLATELET # BLD: 211 K/UL (ref 138–453)
PLATELET ESTIMATE: ABNORMAL
PMV BLD AUTO: 11.2 FL (ref 8.1–13.5)
POC HCO3: 29.8 MMOL/L (ref 21–28)
POC LACTIC ACID: 1.35 MMOL/L (ref 0.56–1.39)
POC O2 SATURATION: 97 % (ref 94–98)
POC PCO2 TEMP: ABNORMAL MM HG
POC PCO2: 45.7 MM HG (ref 35–48)
POC PH TEMP: ABNORMAL
POC PH: 7.42 (ref 7.35–7.45)
POC PO2 TEMP: ABNORMAL MM HG
POC PO2: 85.4 MM HG (ref 83–108)
POSITIVE BASE EXCESS, ART: 5 (ref 0–3)
POTASSIUM SERPL-SCNC: 3.5 MMOL/L (ref 3.7–5.3)
PROTEIN UA: NEGATIVE
RBC # BLD: 3.47 M/UL (ref 3.95–5.11)
RBC # BLD: ABNORMAL 10*6/UL
RBC UA: NORMAL /HPF (ref 0–4)
RENAL EPITHELIAL, UA: NORMAL /HPF
SAMPLE SITE: ABNORMAL
SEG NEUTROPHILS: 81 % (ref 36–65)
SEGMENTED NEUTROPHILS ABSOLUTE COUNT: 6.99 K/UL (ref 1.5–8.1)
SODIUM BLD-SCNC: 142 MMOL/L (ref 135–144)
SPECIFIC GRAVITY UA: 1.01 (ref 1–1.03)
TCO2 (CALC), ART: 31 MMOL/L (ref 22–29)
TRICHOMONAS: NORMAL
TURBIDITY: ABNORMAL
URINE HGB: ABNORMAL
UROBILINOGEN, URINE: NORMAL
WBC # BLD: 8.6 K/UL (ref 3.5–11.3)
WBC # BLD: ABNORMAL 10*3/UL
WBC UA: NORMAL /HPF (ref 0–5)
YEAST: NORMAL

## 2020-10-30 PROCEDURE — 71045 X-RAY EXAM CHEST 1 VIEW: CPT

## 2020-10-30 PROCEDURE — 2500000003 HC RX 250 WO HCPCS: Performed by: STUDENT IN AN ORGANIZED HEALTH CARE EDUCATION/TRAINING PROGRAM

## 2020-10-30 PROCEDURE — 6370000000 HC RX 637 (ALT 250 FOR IP): Performed by: STUDENT IN AN ORGANIZED HEALTH CARE EDUCATION/TRAINING PROGRAM

## 2020-10-30 PROCEDURE — 81001 URINALYSIS AUTO W/SCOPE: CPT

## 2020-10-30 PROCEDURE — 6370000000 HC RX 637 (ALT 250 FOR IP): Performed by: INTERNAL MEDICINE

## 2020-10-30 PROCEDURE — 37799 UNLISTED PX VASCULAR SURGERY: CPT

## 2020-10-30 PROCEDURE — 6360000002 HC RX W HCPCS: Performed by: STUDENT IN AN ORGANIZED HEALTH CARE EDUCATION/TRAINING PROGRAM

## 2020-10-30 PROCEDURE — 94761 N-INVAS EAR/PLS OXIMETRY MLT: CPT

## 2020-10-30 PROCEDURE — 94640 AIRWAY INHALATION TREATMENT: CPT

## 2020-10-30 PROCEDURE — 99291 CRITICAL CARE FIRST HOUR: CPT | Performed by: INTERNAL MEDICINE

## 2020-10-30 PROCEDURE — 2000000000 HC ICU R&B

## 2020-10-30 PROCEDURE — 36556 INSERT NON-TUNNEL CV CATH: CPT

## 2020-10-30 PROCEDURE — 99233 SBSQ HOSP IP/OBS HIGH 50: CPT | Performed by: INTERNAL MEDICINE

## 2020-10-30 PROCEDURE — 2580000003 HC RX 258: Performed by: STUDENT IN AN ORGANIZED HEALTH CARE EDUCATION/TRAINING PROGRAM

## 2020-10-30 PROCEDURE — 82947 ASSAY GLUCOSE BLOOD QUANT: CPT

## 2020-10-30 PROCEDURE — 05HM33Z INSERTION OF INFUSION DEVICE INTO RIGHT INTERNAL JUGULAR VEIN, PERCUTANEOUS APPROACH: ICD-10-PCS | Performed by: FAMILY MEDICINE

## 2020-10-30 PROCEDURE — 80048 BASIC METABOLIC PNL TOTAL CA: CPT

## 2020-10-30 PROCEDURE — 85025 COMPLETE CBC W/AUTO DIFF WBC: CPT

## 2020-10-30 PROCEDURE — 94003 VENT MGMT INPAT SUBQ DAY: CPT

## 2020-10-30 PROCEDURE — 82803 BLOOD GASES ANY COMBINATION: CPT

## 2020-10-30 PROCEDURE — 83735 ASSAY OF MAGNESIUM: CPT

## 2020-10-30 PROCEDURE — 2700000000 HC OXYGEN THERAPY PER DAY

## 2020-10-30 PROCEDURE — 83605 ASSAY OF LACTIC ACID: CPT

## 2020-10-30 PROCEDURE — 94770 HC ETCO2 MONITOR DAILY: CPT

## 2020-10-30 RX ORDER — BISACODYL 10 MG
10 SUPPOSITORY, RECTAL RECTAL DAILY PRN
Status: DISCONTINUED | OUTPATIENT
Start: 2020-10-30 | End: 2020-11-06 | Stop reason: HOSPADM

## 2020-10-30 RX ORDER — PROPOFOL 10 MG/ML
10 INJECTION, EMULSION INTRAVENOUS
Status: DISCONTINUED | OUTPATIENT
Start: 2020-10-30 | End: 2020-11-03

## 2020-10-30 RX ORDER — SODIUM PHOSPHATE, DIBASIC AND SODIUM PHOSPHATE, MONOBASIC 7; 19 G/133ML; G/133ML
1 ENEMA RECTAL ONCE
Status: DISCONTINUED | OUTPATIENT
Start: 2020-10-30 | End: 2020-11-02

## 2020-10-30 RX ORDER — OXYCODONE HYDROCHLORIDE 5 MG/1
10 TABLET ORAL EVERY 8 HOURS
Status: DISCONTINUED | OUTPATIENT
Start: 2020-10-30 | End: 2020-11-02

## 2020-10-30 RX ORDER — FUROSEMIDE 10 MG/ML
20 INJECTION INTRAMUSCULAR; INTRAVENOUS DAILY
Status: DISCONTINUED | OUTPATIENT
Start: 2020-10-31 | End: 2020-11-04

## 2020-10-30 RX ORDER — AMIODARONE HYDROCHLORIDE 200 MG/1
200 TABLET ORAL 2 TIMES DAILY
Status: DISCONTINUED | OUTPATIENT
Start: 2020-10-30 | End: 2020-11-06 | Stop reason: HOSPADM

## 2020-10-30 RX ADMIN — PROPOFOL 5 MCG/KG/MIN: 10 INJECTION, EMULSION INTRAVENOUS at 14:39

## 2020-10-30 RX ADMIN — PROPOFOL 30 MCG/KG/MIN: 10 INJECTION, EMULSION INTRAVENOUS at 16:47

## 2020-10-30 RX ADMIN — Medication 4 MCG/MIN: at 16:47

## 2020-10-30 RX ADMIN — IPRATROPIUM BROMIDE AND ALBUTEROL SULFATE 1 AMPULE: .5; 3 SOLUTION RESPIRATORY (INHALATION) at 03:22

## 2020-10-30 RX ADMIN — HYDROCORTISONE SODIUM SUCCINATE 50 MG: 100 INJECTION, POWDER, FOR SOLUTION INTRAMUSCULAR; INTRAVENOUS at 20:22

## 2020-10-30 RX ADMIN — SODIUM CHLORIDE, PRESERVATIVE FREE 10 ML: 5 INJECTION INTRAVENOUS at 20:23

## 2020-10-30 RX ADMIN — Medication 200 MCG/HR: at 15:13

## 2020-10-30 RX ADMIN — ACETAZOLAMIDE SODIUM 250 MG: 500 INJECTION, POWDER, LYOPHILIZED, FOR SOLUTION INTRAVENOUS at 00:16

## 2020-10-30 RX ADMIN — PREGABALIN 300 MG: 100 CAPSULE ORAL at 07:48

## 2020-10-30 RX ADMIN — Medication 200 MCG/HR: at 20:14

## 2020-10-30 RX ADMIN — PROPOFOL 25 MCG/KG/MIN: 10 INJECTION, EMULSION INTRAVENOUS at 23:58

## 2020-10-30 RX ADMIN — OXYCODONE HYDROCHLORIDE 10 MG: 5 TABLET ORAL at 20:23

## 2020-10-30 RX ADMIN — METOPROLOL TARTRATE 12.5 MG: 25 TABLET ORAL at 20:21

## 2020-10-30 RX ADMIN — ENOXAPARIN SODIUM 70 MG: 80 INJECTION SUBCUTANEOUS at 07:48

## 2020-10-30 RX ADMIN — AMIODARONE HYDROCHLORIDE 200 MG: 200 TABLET ORAL at 11:30

## 2020-10-30 RX ADMIN — OXYCODONE HYDROCHLORIDE 10 MG: 5 TABLET ORAL at 16:23

## 2020-10-30 RX ADMIN — FAMOTIDINE 20 MG: 10 INJECTION INTRAVENOUS at 07:47

## 2020-10-30 RX ADMIN — IPRATROPIUM BROMIDE AND ALBUTEROL SULFATE 1 AMPULE: .5; 3 SOLUTION RESPIRATORY (INHALATION) at 20:32

## 2020-10-30 RX ADMIN — MIDODRINE HYDROCHLORIDE 10 MG: 5 TABLET ORAL at 11:30

## 2020-10-30 RX ADMIN — ASPIRIN 81 MG: 81 TABLET, CHEWABLE ORAL at 07:48

## 2020-10-30 RX ADMIN — POTASSIUM CHLORIDE 40 MEQ: 29.8 INJECTION, SOLUTION INTRAVENOUS at 09:00

## 2020-10-30 RX ADMIN — POLYETHYLENE GLYCOL 3350 17 G: 17 POWDER, FOR SOLUTION ORAL at 08:00

## 2020-10-30 RX ADMIN — BISACODYL 10 MG: 10 SUPPOSITORY RECTAL at 16:24

## 2020-10-30 RX ADMIN — Medication 125 MCG/HR: at 03:03

## 2020-10-30 RX ADMIN — MIDODRINE HYDROCHLORIDE 10 MG: 5 TABLET ORAL at 16:23

## 2020-10-30 RX ADMIN — AMIODARONE HYDROCHLORIDE 200 MG: 200 TABLET ORAL at 20:21

## 2020-10-30 RX ADMIN — CEFTRIAXONE SODIUM 2 G: 2 INJECTION, POWDER, FOR SOLUTION INTRAMUSCULAR; INTRAVENOUS at 10:29

## 2020-10-30 RX ADMIN — WATER 10 ML: 1 INJECTION INTRAMUSCULAR; INTRAVENOUS; SUBCUTANEOUS at 10:30

## 2020-10-30 RX ADMIN — MIDODRINE HYDROCHLORIDE 10 MG: 5 TABLET ORAL at 07:48

## 2020-10-30 RX ADMIN — SPIRONOLACTONE 25 MG: 25 TABLET ORAL at 07:48

## 2020-10-30 RX ADMIN — IPRATROPIUM BROMIDE AND ALBUTEROL SULFATE 1 AMPULE: .5; 3 SOLUTION RESPIRATORY (INHALATION) at 14:37

## 2020-10-30 RX ADMIN — METOPROLOL TARTRATE 12.5 MG: 25 TABLET ORAL at 07:48

## 2020-10-30 RX ADMIN — FAMOTIDINE 20 MG: 10 INJECTION INTRAVENOUS at 20:21

## 2020-10-30 RX ADMIN — HYDROCORTISONE SODIUM SUCCINATE 50 MG: 100 INJECTION, POWDER, FOR SOLUTION INTRAMUSCULAR; INTRAVENOUS at 00:17

## 2020-10-30 RX ADMIN — Medication 150 MCG/HR: at 09:50

## 2020-10-30 RX ADMIN — ACETAZOLAMIDE SODIUM 250 MG: 500 INJECTION, POWDER, LYOPHILIZED, FOR SOLUTION INTRAVENOUS at 10:30

## 2020-10-30 RX ADMIN — DOCUSATE SODIUM 100 MG: 50 LIQUID ORAL at 07:48

## 2020-10-30 RX ADMIN — LEVOTHYROXINE SODIUM 150 MCG: 75 TABLET ORAL at 07:48

## 2020-10-30 RX ADMIN — HYDROCORTISONE SODIUM SUCCINATE 50 MG: 100 INJECTION, POWDER, FOR SOLUTION INTRAMUSCULAR; INTRAVENOUS at 07:47

## 2020-10-30 RX ADMIN — IPRATROPIUM BROMIDE AND ALBUTEROL SULFATE 1 AMPULE: .5; 3 SOLUTION RESPIRATORY (INHALATION) at 08:27

## 2020-10-30 RX ADMIN — PREGABALIN 300 MG: 100 CAPSULE ORAL at 20:21

## 2020-10-30 RX ADMIN — ENOXAPARIN SODIUM 70 MG: 80 INJECTION SUBCUTANEOUS at 20:21

## 2020-10-30 ASSESSMENT — PULMONARY FUNCTION TESTS
PIF_VALUE: 12
PIF_VALUE: 14
PIF_VALUE: 9
PIF_VALUE: 26
PIF_VALUE: 19
PIF_VALUE: 30
PIF_VALUE: 23
PIF_VALUE: 6
PIF_VALUE: 27

## 2020-10-30 NOTE — PLAN OF CARE
Problem: Skin Integrity:  Goal: Will show no infection signs and symptoms  Description: Will show no infection signs and symptoms  10/30/2020 1107 by Lori Bradley RN  Outcome: Ongoing  10/30/2020 0614 by Dolly Retana RN  Outcome: Ongoing  Goal: Absence of new skin breakdown  Description: Absence of new skin breakdown  10/30/2020 1107 by Lori Bradley RN  Outcome: Ongoing  10/30/2020 0614 by Dolly Retana RN  Outcome: Ongoing     Problem: Falls - Risk of:  Goal: Will remain free from falls  Description: Will remain free from falls  10/30/2020 1107 by Lori Bradley RN  Outcome: Ongoing  10/30/2020 0614 by Dolly Retana RN  Outcome: Ongoing  Goal: Absence of physical injury  Description: Absence of physical injury  10/30/2020 1107 by Lori Bradley RN  Outcome: Ongoing  10/30/2020 0614 by Dolly Retana RN  Outcome: Ongoing     Problem: Falls - Risk of:  Goal: Absence of physical injury  Description: Absence of physical injury  10/30/2020 1107 by Lori Bradley RN  Outcome: Ongoing  10/30/2020 0614 by Dolly Retana RN  Outcome: Ongoing     Problem: Pain:  Goal: Pain level will decrease  Description: Pain level will decrease  10/30/2020 1107 by Lori Bradley RN  Outcome: Ongoing  10/30/2020 0614 by Dolly Retana RN  Outcome: Ongoing  Goal: Control of acute pain  Description: Control of acute pain  10/30/2020 1107 by Lori Bradley RN  Outcome: Ongoing  10/30/2020 0614 by Dolly Retana RN  Outcome: Ongoing  Goal: Control of chronic pain  Description: Control of chronic pain  10/30/2020 1107 by Lori Bradley RN  Outcome: Ongoing  10/30/2020 0614 by Dolly Retana RN  Outcome: Ongoing     Problem: OXYGENATION/RESPIRATORY FUNCTION  Goal: Patient will maintain patent airway  10/30/2020 1107 by Lori Bradley RN  Outcome: Ongoing  10/30/2020 0614 by Dolly Retana RN  Outcome: Ongoing  Goal: Patient will achieve/maintain normal respiratory rate/effort  Description: Respiratory rate and effort will be within normal limits for the patient  10/30/2020 1107 by Lo Finn RN  Outcome: Ongoing  10/30/2020 0614 by Stephane Márquez RN  Outcome: Ongoing     Problem: MECHANICAL VENTILATION  Goal: Patient will maintain patent airway  10/30/2020 1107 by Lo Finn RN  Outcome: Ongoing  10/30/2020 0614 by Stephane Márquez RN  Outcome: Ongoing  Goal: Oral health is maintained or improved  10/30/2020 1107 by Lo Finn RN  Outcome: Ongoing  10/30/2020 0614 by Stephane Márquez RN  Outcome: Ongoing  Goal: ET tube will be managed safely  10/30/2020 1107 by Lo Finn RN  Outcome: Ongoing  10/30/2020 0614 by Stephane Márquez RN  Outcome: Ongoing  Goal: Ability to express needs and understand communication  10/30/2020 1107 by Lo Finn RN  Outcome: Ongoing  10/30/2020 0614 by Stephane Márquez RN  Outcome: Ongoing  Goal: Mobility/activity is maintained at optimum level for patient  10/30/2020 1107 by Lo Finn RN  Outcome: Ongoing  10/30/2020 0614 by Stephane Márquez RN  Outcome: Ongoing     Problem: MECHANICAL VENTILATION  Goal: Oral health is maintained or improved  10/30/2020 1107 by Lo Finn RN  Outcome: Ongoing  10/30/2020 0614 by Stephane Márquez RN  Outcome: Ongoing     Problem: Nutrition  Goal: Optimal nutrition therapy  Description: Nutrition Problem #1: Inadequate oral intake  Intervention: Food and/or Nutrient Delivery: Start nutrition as able; if TF, suggest Standard without Fiber goal 65 mL/hr to provide 1872 kcal and 87 g pro/day.   Nutritional Goals: meet % of estimated nutrient needs     10/30/2020 1107 by Lo Fnin RN  Outcome: Ongoing  10/30/2020 0614 by Stephane Márquez RN  Outcome: Ongoing     Problem: Confusion - Acute:  Goal: Absence of continued neurological deterioration signs and symptoms  Description: Absence of continued neurological deterioration signs and symptoms  10/30/2020 1107 by Lo Finn RN  Outcome: Ongoing  10/30/2020 0614 by Stephane Márquez RN  Outcome: Ongoing  Goal: Mental status will be restored to baseline  Description: Mental status will be restored to baseline  10/30/2020 1107 by Rosendo Eduardo RN  Outcome: Ongoing  10/30/2020 0614 by Ollie Dougherty RN  Outcome: Ongoing     Problem: Injury - Risk of, Physical Injury:  Goal: Will remain free from falls  Description: Will remain free from falls  10/30/2020 1107 by Rosendo Eduardo RN  Outcome: Ongoing  10/30/2020 0614 by Ollie Dougherty RN  Outcome: Ongoing  Goal: Absence of physical injury  Description: Absence of physical injury  10/30/2020 1107 by Rosendo Eduardo RN  Outcome: Ongoing  10/30/2020 0614 by Ollie Dougherty RN  Outcome: Ongoing     Problem: Psychomotor Activity - Altered:  Goal: Absence of psychomotor disturbance signs and symptoms  Description: Absence of psychomotor disturbance signs and symptoms  10/30/2020 1107 by Rosendo Eduardo RN  Outcome: Ongoing  10/30/2020 0614 by Ollie Dougherty RN  Outcome: Ongoing

## 2020-10-30 NOTE — PLAN OF CARE
Problem: OXYGENATION/RESPIRATORY FUNCTION  Goal: Patient will maintain patent airway  Outcome: Ongoing     Problem: OXYGENATION/RESPIRATORY FUNCTION  Goal: Patient will achieve/maintain normal respiratory rate/effort  Description: Respiratory rate and effort will be within normal limits for the patient  Outcome: Ongoing     Problem: MECHANICAL VENTILATION  Goal: Patient will maintain patent airway  Outcome: Ongoing     Problem: MECHANICAL VENTILATION  Goal: Oral health is maintained or improved  Outcome: Ongoing     Problem: MECHANICAL VENTILATION  Goal: ET tube will be managed safely  Outcome: Ongoing     Problem: MECHANICAL VENTILATION  Goal: Ability to express needs and understand communication  Outcome: Ongoing     Problem: MECHANICAL VENTILATION  Goal: Mobility/activity is maintained at optimum level for patient  Outcome: Ongoing     Problem: SKIN INTEGRITY  Goal: Skin integrity is maintained or improved  10/29/2020 2020 by Froy Coughlin RCP  Outcome: Ongoing

## 2020-10-30 NOTE — PROGRESS NOTES
Comprehensive Nutrition Assessment    Type and Reason for Visit:  Reassess    Nutrition Recommendations/Plan: Continue NPO. Continue TF via OGT with standard formula without fiber (Osmolite 1.2 Herbert) at goal rate of 65 ml/hr. Monitor TF intake/adequacy. Nutrition Assessment:  Patient remains on vent. Tolerating tube feedings at goal rate of 65 ml/hr, per RN. No BM since 10/22; RN states she gave patient glycolax and colace today. Weight stable since admission. Malnutrition Assessment:  Malnutrition Status: At risk for malnutrition   Context:  Acute Illness     Findings of the 6 clinical characteristics of malnutrition:  Energy Intake:  No significant decrease in energy intake  Weight Loss:  Unable to assess     Body Fat Loss:  No significant body fat loss     Muscle Mass Loss:  No significant muscle mass loss    Fluid Accumulation:  1 - Mild - Generalized   Strength:  Not Performed    Estimated Daily Nutrient Needs:  Energy (kcal):  5013-5922 kcal/day; Weight Used for Energy Requirements:  Admission     Protein (g):  80 g pro/day; Weight Used for Protein Requirements:  Ideal(1.5)          Nutrition Related Findings:  Labs reviewed include: K 3.5 (L). Meds reviewed include: Lasix, Spironolactone. Last BM 10/22. +1 generalized edema. Wounds:  None       Current Nutrition Therapies:    Current Tube Feeding (TF) Orders:  · Feeding Route: Orogastric  · Formula: Standard without Fiber(Osmolite 1.2 Herbert)  · Schedule: Continuous  · Current TF & Flush Orders Provides: See below  · Goal TF & Flush Orders Provides: @ 65 ml/hr = 1872 kcal, 87 g protein    Anthropometric Measures:  · Height: 5' 4\" (162.6 cm)  · Current Body Weight: 161 lb 13.1 oz (73.4 kg)   · Admission Body Weight: 157 lb 6.5 oz (71.4 kg)     · Ideal Body Weight: 120 lbs; % Ideal Body Weight 134%  · BMI: 27.8  · Adjusted Body Weight:  No Adjustment   · BMI Categories: Overweight (BMI 25.0-29. 9)       Nutrition Diagnosis:   · Inadequate oral intake related to impaired respiratory function as evidenced by NPO or clear liquid status due to medical condition, intubation, nutrition support - enteral nutrition    Nutrition Interventions:   Food and/or Nutrient Delivery:  Continue Current Tube Feeding  Nutrition Education/Counseling:  No recommendation at this time, Education not indicated   Coordination of Nutrition Care:  Continue to monitor while inpatient    Goals:  meet % of estimated nutrient needs       Nutrition Monitoring and Evaluation:   Behavioral-Environmental Outcomes:  (N/A)   Food/Nutrient Intake Outcomes:  Enteral Nutrition Intake/Tolerance  Physical Signs/Symptoms Outcomes:  Biochemical Data, Constipation, GI Status, Fluid Status or Edema, Hemodynamic Status, Nutrition Focused Physical Findings, Weight     Discharge Planning:     Too soon to determine     Electronically signed by Flaco Wolf RD, LD on 10/30/20 at 1:28 PM EDT    Contact: [phone]

## 2020-10-30 NOTE — PROGRESS NOTES
[facility] - Progress Note    Today's Date and Time: 10/30/2020, 8:14 AM    Impression :   · Septic Shock   · Bandemia  · CHF with elevated ProBNP  · Pulmonary edema  · Superimposed pneumonia; sputum cultures 10/25/20 grew Klebsiella Pneumoniae moderate growth  · COPD  · Elevated Troponins  · Hypothyroidism    Recommendations:   · Rocephin 2 gm IV q 24 hr (start date 10/23)  · D/C Zithromax 500 mg IV q 24 hr (start date 10/23. Stop date 10-27-20)  Medical Decision Making/Summary/Discussion:   · Patient with CHF  · Rapid deterioration with acute hypoxia and hypercarbia requiring intubation  · CXR with rapid fluid shifts suggesting pulmonary edema. Very high ProBNP  · Can not exclude associated pneumonia at this stage. Pro calcitonin elevated  Infection Control Recommendations   · Daisy Precautions    Antimicrobial Stewardship Recommendations     · Simplification of therapy  · Targeted therapy  Coordination of Outpatient Care:   · Estimated Length of IV antimicrobials:TBD  · Patient will need Midline Catheter Insertion: No  · Patient will need PICC line Insertion:Yes  · Patient will need: Home IV , [place],  SNF,  LTAC: TBD  · Patient will need outpatient wound care:No    Chief complaint/reason for consultation:   · Sepsis secondary to pneumonia      History of Present Illness:   Huy Funes is a 77y.o.-year-old  female who was initially admitted on 10/22/2020. Patient seen at the request of Dr. Lexie Thompson:    History was obtained from chart review and unobtainable from patient due to mental status. She has a history of hypothyroidism, COPD, iron deficiency anemia, presented to [facility] emergency department on 10/22 for difficulties breathing. Found to have pneumonia, and started on the sepsis protocol. At [address] V's, she is unable to provide much history as she is somnolent, but does follow commands.  per EMR review became on the morning of 10/22, prompting her emergency department visit. There they performed a chest x-ray as well as CT scan which was negative for any pulmonary embolism but did show bilateral infiltrates. Found to have a lactic acidosis as well, and started on Rocephin and azithromycin. Initially hypercapnic, her mentation improved on BiPAP on 10/22. CURRENT EVALUATION: 10/30/20     Patient evaluated and examined in the ICU. Afebrile  Tachycardia improved. Not alert, awake, not following commands. off of paralytics. Moving extremities spontaneously, not communicative. Patient re-started on low dose levophed yesterday, weaned off today. On Midodrine. Intubated, sedated, on ventilator, CPAP mode. Switched to Amiodarone drip on 10/28. In NSR today. NSTEMI, off of heparin drip, new onset reduced systolic dysfunction, EF 28%, apical hypokinesis. Cardiac cath done. Mild CAD. Recommend medical therapy for CAD and dilated cardiomyopathy. IV Lasix on hold due to alkalosis, started on Aldactone. Life vest on discharge. Cardiology following. Currently on Rocephin 2 gm IV q 24 hr (start date 10/23). UA 10-30-20 shows turbid urine, moderate Hgb, no bacteria. CXR:  · 10/24 increased bilat infiltrates  · 10/26 shows bilateral upper and lower lobe infiltrates unchanged from previous x-ray. · 10-27-20: shows coarse diffuse bilateral infiltrates with superimposed haziness, worse than yesterday. Patient shows fluid shifts. · 10-28-20: Multifocal interstitial and consolidative infiltrates seen throughout the lungs bilaterally, with worsening consolidation in the right lung base.    · 10-30-20: Bilateral airspace disease again noted, although less prominent      Labs:    BUN: 17 > 16 > 15>14>19>24>23  Creatinine: 0.56>0.44>0.37>0.47>0.52    WBC: 9.4 > 16.8 > 13.7 >14.2>9.7>6.5>9.9>8.6  Hgb: 12 >10.5 >11.3>10.9>10.6>10.4  Platelet: 771 >543 >488>537>439>460>171    Lactic acid 6.2 > 2.9 > 2.0>1.7    Cultures:  Urine:  ·   Blood:  · 10/22/20: No growth   Sputum :  · 10/25/20: Klebsiella pneumoniae moderate growth. Wound:     CSF         Discussed with patient, RN,     I have personally reviewed the past medical history, past surgical history, medications, social history, and family history, and I have updated the database accordingly.   Past Medical History:     Past Medical History:   Diagnosis Date    COPD (chronic obstructive pulmonary disease) ([address]     Depression     GERD (gastroesophageal reflux disease)     Osteoporosis        Past Surgical  History:     Past Surgical History:   Procedure Laterality Date    BACK SURGERY      BREAST SURGERY      CARPAL TUNNEL RELEASE      FRACTURE SURGERY Left 12/20/2018    ORIF wrist    HYSTERECTOMY      JOINT REPLACEMENT      right knee    JOINT REPLACEMENT      WRIST FRACTURE SURGERY Left 12/20/2018    WRIST OPEN REDUCTION INTERNAL FIXATION-DISTAL RADIUS performed by Purnima Arellano MD at [address]       Medications:      sterile water  10 mL Injection Daily    acetaZOLAMIDE  250 mg Intravenous Q12H    midodrine  10 mg Oral TID WC    spironolactone  25 mg Oral Daily    [Held by provider] furosemide  40 mg Intravenous BID    metoprolol tartrate  12.5 mg Oral BID    docusate  100 mg Oral Daily    polyethylene glycol  17 g Oral Daily    cefTRIAXone (ROCEPHIN) IV  2 g Intravenous Q24H    enoxaparin  1 mg/kg Subcutaneous BID    sodium chloride flush  10 mL Intravenous 2 times per day    aspirin  81 mg Oral Daily    hydrocortisone sodium succinate PF  50 mg Intravenous Q8H    ipratropium-albuterol  1 ampule Inhalation Q6H    famotidine (PEPCID) injection  20 mg Intravenous BID    anesthetic and narcotic custom mixture   Intrathecal Once    levothyroxine  150 mcg Oral Daily    pregabalin  300 mg Oral BID    sodium chloride flush  10 mL Intravenous 2 times per day       Social History:     Social History     Socioeconomic History    Marital status:      Spouse name: Not on file    Number of children: Not on file    Years of education: Not on file    Highest education level: Not on file   Occupational History    Not on file   Social Needs    Financial resource strain: Not on file    Food insecurity     Worry: Not on file     Inability: Not on file    Transportation needs     Medical: Not on file     Non-medical: Not on file   Tobacco Use    Smoking status: Former Smoker     Last attempt to quit: 1976     Years since quittin.0    Smokeless tobacco: Never Used   Substance and Sexual Activity    Alcohol use: No    Drug use: No    Sexual activity: Not on file   Lifestyle    Physical activity     Days per week: Not on file     Minutes per session: Not on file    Stress: Not on file   Relationships    Social connections     Talks on phone: Not on file     Gets together: Not on file     Attends Restoration service: Not on file     Active member of club or organization: Not on file     Attends meetings of clubs or organizations: Not on file     Relationship status: Not on file    Intimate partner violence     Fear of current or ex partner: Not on file     Emotionally abused: Not on file     Physically abused: Not on file     Forced sexual activity: Not on file   Other Topics Concern    Not on file   Social History Narrative    Not on file       Family History:   No family history on file. Allergies:   Patient has no known allergies.      Review of Systems:   Review of Systems   Unable to perform ROS: Intubated          Physical Examination :     Patient Vitals for the past 8 hrs:   BP Temp Temp src Pulse Resp SpO2 Weight   10/30/20 0700 (!) 153/60 -- -- 96 21 96 % --   10/30/20 0600 (!) 168/80 -- -- 98 19 96 % --   10/30/20 0500 (!) 155/59 -- -- 101 19 97 % --   10/30/20 0437 -- -- -- -- 21 96 % --   10/30/20 0400 (!) 140/60 98.8 °F (37.1 °C) Oral 107 23 96 % 161 lb 13.1 oz (73.4 kg)   10/30/20 0315 -- -- -- 68 20 98 % --   10/30/20 0300 (!) 105/49 -- -- 68 21 98 % --   10/30/20 0245 -- -- -- 69 20 97 % --   10/30/20 0230 -- -- -- 68 20 97 % --   10/30/20 0215 -- -- -- 70 21 97 % --   10/30/20 0200 (!) 109/52 -- -- 70 20 97 % --   10/30/20 0145 -- -- -- 71 20 97 % --   10/30/20 0130 -- -- -- 71 20 97 % --   10/30/20 0115 -- -- -- 72 21 97 % --   10/30/20 0100 (!) 99/43 -- -- 74 20 97 % --   10/30/20 0030 -- -- -- 87 19 97 % --   10/30/20 0015 -- -- -- 90 22 96 % --     Physical Exam  Constitutional:       General: She is not in acute distress. Appearance: Normal appearance. She is obese. She is ill-appearing. HENT:      Head: Normocephalic and atraumatic. Nose: Nose normal. No congestion. Mouth/Throat:      Mouth: Mucous membranes are moist.   Eyes:      General: No scleral icterus. Conjunctiva/sclera: Conjunctivae normal.   Neck:      Musculoskeletal: Neck supple. No neck rigidity. Cardiovascular:      Rate and Rhythm: Normal rate and regular rhythm. Heart sounds: Normal heart sounds. No murmur. Pulmonary:      Effort: No respiratory distress. Breath sounds: Rales present. Abdominal:      General: There is no distension. Palpations: Abdomen is soft. Tenderness: There is no abdominal tenderness. Genitourinary:     Comments: Urine maral  R fem line in good condition  Musculoskeletal:         General: No swelling or tenderness. Skin:     Coloration: Skin is not jaundiced or pale.    Neurological:      Comments: Sedated    Psychiatric:      Comments: Calm on the vent sedated          Medical Decision Making -Laboratory:   I have independently reviewed/ordered the following labs:    CBC with Differential:   Recent Labs     10/29/20  0442 10/30/20  0543   WBC 9.9 8.6   HGB 10.4* 10.4*   HCT 33.5* 33.8*    211   LYMPHOPCT 19* 11*   MONOPCT 7 6     BMP:   Recent Labs     10/29/20  0442 10/30/20  0543    142   K 3.9 3.5*    104   CO2 38* 29   BUN 24* 23   CREATININE

## 2020-10-30 NOTE — PROGRESS NOTES
[facility]   Progress Note                   Date:   10/30/2020  Patient name: Keyur Pelayo  Date of admission:  10/22/2020  6:58 PM  MRN:   [id number]  YOB: 1945  PCP: Sharon Morales MD    Reason for Admission:     Subjective:      Intubated  Held lasix because of alkalosis  UOP 3270 ml over 24 hrs    Intake/Output Summary (Last 24 hours) at 10/30/2020 1142  Last data filed at 10/30/2020 0800  Gross per 24 hour   Intake 3434.18 ml   Output 3260 ml   Net 174.18 ml       Medications:   Scheduled Meds:   amiodarone  200 mg Oral BID    midodrine  10 mg Oral TID WC    spironolactone  25 mg Oral Daily    [Held by provider] furosemide  40 mg Intravenous BID    metoprolol tartrate  12.5 mg Oral BID    docusate  100 mg Oral Daily    polyethylene glycol  17 g Oral Daily    cefTRIAXone (ROCEPHIN) IV  2 g Intravenous Q24H    enoxaparin  1 mg/kg Subcutaneous BID    sodium chloride flush  10 mL Intravenous 2 times per day    aspirin  81 mg Oral Daily    hydrocortisone sodium succinate PF  50 mg Intravenous Q8H    ipratropium-albuterol  1 ampule Inhalation Q6H    famotidine (PEPCID) injection  20 mg Intravenous BID    anesthetic and narcotic custom mixture   Intrathecal Once    levothyroxine  150 mcg Oral Daily    pregabalin  300 mg Oral BID    sodium chloride flush  10 mL Intravenous 2 times per day     Continuous Infusions:   norepinephrine Stopped (10/30/20 0753)    fentaNYL 200 mcg/hr (10/30/20 1139)    midazolam 7 mg/hr (10/30/20 1125)     CBC:   Recent Labs     10/28/20  0540 10/29/20  0442 10/30/20  0543   WBC 6.5 9.9 8.6   HGB 10.6* 10.4* 10.4*    235 211     BMP:    Recent Labs     10/28/20  0540 10/29/20  0442 10/30/20  0543   * 144 142   K 4.3 3.9 3.5*    100 104   CO2 33* 38* 29   BUN 19 24* 23   CREATININE 0.37* 0.47* 0.52   GLUCOSE 134* 126* 148*     Hepatic:   Recent Labs     10/28/20  0540 10/29/20  0442   AST 10 15   ALT 9 14   BILITOT 0.16* 0.29*   ALKPHOS 69 68     Troponin: No results for input(s): TROPONINI in the last 72 hours. BNP: No results for input(s): BNP in the last 72 hours. Lipids: No results for input(s): CHOL, HDL in the last 72 hours. Invalid input(s): LDLCALCU  INR: No results for input(s): INR in the last 72 hours. Objective:   Vitals: BP (!) 118/52   Pulse 77   Temp 99.1 °F (37.3 °C) (Oral)   Resp 14   Ht 5' 4\" (1.626 m)   Wt 161 lb 13.1 oz (73.4 kg)   SpO2 97%   BMI 27.78 kg/m²   Constitutional and General Appearance: intubated    Respiratory:  · Clear to auscultation bilaterally, with equal air entry  Cardiovascular:  · S1, s2, rrr, no pain on palpation of anterior chest wall. Abdomen:   · No masses or tenderness, soft abdomen  · Bowel sounds present  Extremities:  · No le edema, peripheral pulse bl le present 2 +  Integumentum:   Intact with no rashes noted    EKG:         Atrial flutter with variable A-V block  Inferior infarct (cited on or before 23-OCT-2020)  Anterior infarct (cited on or before 22-OCT-2020)  T wave abnormality, consider lateral ischemia  Abnormal ECG  When compared with ECG of 23-OCT-2020 19:46,  Atrial flutter has replaced Atrial fibrillation  Nonspecific T wave abnormality now evident in Inferior leads  T wave inversion now evident in Anterior leads        ECHO:10/22  Left ventricle is normal in size. Global left ventricular systolic function  is severely reduced. Estimated ejection fraction is 25-30 % . Calculated EF via heart model is 30 %. The apex appears akinetic. Cannot exclude an apical thrombus, consider  Definity. Mild mitral regurgitation. Mild to moderate tricuspid regurgitation. Estimated right ventricular  systolic pressure is 35 mmHg.   IVC dilated but unable to assess respiratory collapse due to patient on  ventilator.     Cardiac Angiography:10/26        LMCA: Mild irregularities 10-20%.     LAD: Mild irregularities 10-20%.     LCx: Mild irregularities 10-20%.     RCA: Mild irregularities 10-20%.     Coronary Tree      Dominance: Right     Estimated Blood Loss: 10 mL     Conclusions:      Mild cAD.        Recommendations:      Medical therapy for CAD and dilated cardiomyopathy.   Reassess EF in 3 months      Assessment / Acute Cardiac Problems:   1. Acute hypoxic respiratory failure possibly secondary to bilateral lobe pneumonia, possibly secondary to pulmonary edema  2. Community-acquired pneumonia secondary to lactose fermenting gram-negative rods  3. Possible cardiogenic shock  4. New onset systolic dysfunction with EF of 25 to 30% (Takotsubo cardiomyopathy)  5. New onset A. fib/atrial flutter with RVR now with normal sinus rhythm  6. Another episode of A fib on 10/28  Restarted back on amio gtt  7. Rheumatoid arthritis on Plaquenil  8. No cardiac evaluation in the past      Plan of Treatment:   1. Change to PO amiodarone 200 mg bd  2. Continue therapeutic Lovenox for anticoagulation for A fib  3. Continue IV lasix 40 mg daily ( alkalosis improved)  4. Continue lopressor 12.5 mg bd for nonischemic cardiomyopathy, will add aldactone 25 mg daily  5. Will add ACE as tolerated  6. Need to reassess EF in 3 months for evaluation of AICD  7. Discharge on life vest  8. Will follow     Will discuss with rounding attending Dr. Onel Castellon for final recommendations. Mono Galdamez MD,  Internal Medicine Resident, PGY-3,   [address]  10/30/2020,11:42 AM    Attending Cardiologist Addendum: I have reviewed and performed the history, physical, subjective, objective, assessment, and plan with the resident/fellow and agree with the note. I performed the history and physical personally. I have made changes to the note above as needed.     NICM, HFrEF, Takotsubo CM, minimal CAD on Cath  PAF with RVR  In NSR, change amio gtt to via NG bid  Change to NOAC on d/c  Consider lifevest prior to d/c  Janet as able    Thank you for allowing me to participate in the care of this patient, please do not hesitate to call if you have any questions. Gris Shields DO, [address], [address] [facility]  [facility]  [phone]

## 2020-10-30 NOTE — PLAN OF CARE
Problem: Skin Integrity:  Goal: Will show no infection signs and symptoms  Description: Will show no infection signs and symptoms  Outcome: Ongoing  Goal: Absence of new skin breakdown  Description: Absence of new skin breakdown  Outcome: Ongoing     Problem: Falls - Risk of:  Goal: Will remain free from falls  Description: Will remain free from falls  Outcome: Ongoing  Goal: Absence of physical injury  Description: Absence of physical injury  Outcome: Ongoing     Problem: Pain:  Goal: Pain level will decrease  Description: Pain level will decrease  Outcome: Ongoing  Goal: Control of acute pain  Description: Control of acute pain  Outcome: Ongoing  Goal: Control of chronic pain  Description: Control of chronic pain  Outcome: Ongoing     Problem: OXYGENATION/RESPIRATORY FUNCTION  Goal: Patient will maintain patent airway  10/30/2020 0614 by Dilcia Ho RN  Outcome: Ongoing  10/29/2020 2020 by Hillary Toney RCP  Outcome: Ongoing  Goal: Patient will achieve/maintain normal respiratory rate/effort  Description: Respiratory rate and effort will be within normal limits for the patient  10/30/2020 0614 by Dilcia Ho RN  Outcome: Ongoing  10/29/2020 2020 by Hillary Toney RCP  Outcome: Ongoing     Problem: MECHANICAL VENTILATION  Goal: Patient will maintain patent airway  10/30/2020 0614 by Dilcia Ho RN  Outcome: Ongoing  10/29/2020 2020 by Hillary Toney RCP  Outcome: Ongoing  Goal: Oral health is maintained or improved  10/30/2020 0614 by Dilcia Ho RN  Outcome: Ongoing  10/29/2020 2020 by Hillary Toney RCP  Outcome: Ongoing  Goal: ET tube will be managed safely  10/30/2020 0614 by Dilcia Ho RN  Outcome: Ongoing  10/29/2020 2020 by Hillary Toney RCP  Outcome: Ongoing  Goal: Ability to express needs and understand communication  10/30/2020 0614 by Dilcia Ho RN  Outcome: Ongoing  10/29/2020 2020 by Hillary Toney RCP  Outcome: Ongoing  Goal: Mobility/activity is maintained at optimum level for patient  10/30/2020 5874 by Marques Davis RN  Outcome: Ongoing  10/29/2020 2020 by Jalen Hassan RCP  Outcome: Ongoing     Problem: SKIN INTEGRITY  Goal: Skin integrity is maintained or improved  10/30/2020 0614 by Marques Davis RN  Outcome: Ongoing  10/29/2020 2020 by Jalen Hassan RCP  Outcome: Ongoing     Problem: Nutrition  Goal: Optimal nutrition therapy  Description: Nutrition Problem #1: Inadequate oral intake  Intervention: Food and/or Nutrient Delivery: Start nutrition as able; if TF, suggest Standard without Fiber goal 65 mL/hr to provide 1872 kcal and 87 g pro/day.   Nutritional Goals: meet % of estimated nutrient needs     Outcome: Ongoing     Problem: Confusion - Acute:  Goal: Absence of continued neurological deterioration signs and symptoms  Description: Absence of continued neurological deterioration signs and symptoms  Outcome: Ongoing  Goal: Mental status will be restored to baseline  Description: Mental status will be restored to baseline  Outcome: Ongoing     Problem: Discharge Planning:  Goal: Ability to perform activities of daily living will improve  Description: Ability to perform activities of daily living will improve  Outcome: Ongoing  Goal: Participates in care planning  Description: Participates in care planning  Outcome: Ongoing     Problem: Injury - Risk of, Physical Injury:  Goal: Will remain free from falls  Description: Will remain free from falls  Outcome: Ongoing  Goal: Absence of physical injury  Description: Absence of physical injury  Outcome: Ongoing     Problem: Mood - Altered:  Goal: Mood stable  Description: Mood stable  Outcome: Ongoing  Goal: Absence of abusive behavior  Description: Absence of abusive behavior  Outcome: Ongoing  Goal: Verbalizations of feeling emotionally comfortable while being cared for will increase  Description: Verbalizations of feeling emotionally comfortable while being cared for will increase  Outcome: Ongoing Problem: Psychomotor Activity - Altered:  Goal: Absence of psychomotor disturbance signs and symptoms  Description: Absence of psychomotor disturbance signs and symptoms  Outcome: Ongoing     Problem: Sensory Perception - Impaired:  Goal: Demonstrations of improved sensory functioning will increase  Description: Demonstrations of improved sensory functioning will increase  Outcome: Ongoing  Goal: Decrease in sensory misperception frequency  Description: Decrease in sensory misperception frequency  Outcome: Ongoing  Goal: Able to refrain from responding to false sensory perceptions  Description: Able to refrain from responding to false sensory perceptions  Outcome: Ongoing  Goal: Demonstrates accurate environmental perceptions  Description: Demonstrates accurate environmental perceptions  Outcome: Ongoing  Goal: Able to distinguish between reality-based and nonreality-based thinking  Description: Able to distinguish between reality-based and nonreality-based thinking  Outcome: Ongoing  Goal: Able to interrupt nonreality-based thinking  Description: Able to interrupt nonreality-based thinking  Outcome: Ongoing     Problem: Sleep Pattern Disturbance:  Goal: Appears well-rested  Description: Appears well-rested  Outcome: Ongoing

## 2020-10-30 NOTE — PROGRESS NOTES
Critical Care Team - Daily Progress Note      Date and time: 10/30/2020 8:53 AM  Patient's name:  Geovanni Gardner  Medical Record Number: [id number]  Patient's account/billing number: [de-identified]  Patient's YOB: 1945  Age: 77 y.o. Date of Admission: 10/22/2020  6:58 PM  Length of stay during current admission: 8    Primary Care Physician: Vidhi Monte MD  ICU Attending Physician: Dr. Ana Lilia Schwartz Status: Full Code    Reason for ICU admission: Pneumonia vs CHF      SUBJECTIVE:     OVERNIGHT EVENTS:       No acute events overnight.     AWAKE & FOLLOWING COMMANDS:  [] No   [x] Yes    CURRENT VENTILATION STATUS:     [x] Ventilator  [] BIPAP  [] Nasal Cannula [] Room Air      IF INTUBATED, ET TUBE MARKING AT LOWER LIP:       cms    SEDATION:  RAAS Score:  [] Propofol gtt  [x] Versed gtt  [] Ativan gtt   [] No Sedation    PARALYZED:  [x] No    [] Yes    DIARRHEA:                [x] No                [] Yes  (C. Difficile status: [] positive                                                                                                                       [] negative                                                                                                                     [] pending)    VASOPRESSORS:  [] No    [x] Yes    If yes -   [x] Levophed       [] Dopamine     [] Vasopressin       [] Dobutamine  [] Phenylephrine         [] Epinephrine    CENTRAL LINES:     [] No   [x] Yes   (Date of Insertion: 10/23  )           If yes -     [] Right IJ     [] Left IJ [x] Right Femoral [] Left Femoral                   [] Right Subclavian [] Left Subclavian       LUNA'S CATHETER:   [] No   [x] Yes  (Date of Insertion: 10/22  )     URINE OUTPUT:            [x] Good   [] Low              [] Anuric      OBJECTIVE:     VITAL SIGNS:  BP (!) 153/60   Pulse 93   Temp 98.8 °F (37.1 °C) (Oral)   Resp 19   Ht 5' 4\" (1.626 m)   Wt 161 lb 13.1 oz (73.4 kg)   SpO2 97%   BMI 27.78 kg/m²     Tmax over 24 hours: Temp (24hrs), Av.7 °F (37.1 °C), Min:98.2 °F (36.8 °C), Max:99.3 °F (37.4 °C)      Patient Vitals for the past 6 hrs:   BP Temp Temp src Pulse Resp SpO2 Weight   10/30/20 0828 -- -- -- 93 19 97 % --   10/30/20 0700 (!) 153/60 -- -- 96 21 96 % --   10/30/20 0600 (!) 168/80 -- -- 98 19 96 % --   10/30/20 0500 (!) 155/59 -- -- 101 19 97 % --   10/30/20 0437 -- -- -- -- 21 96 % --   10/30/20 0400 (!) 140/60 98.8 °F (37.1 °C) Oral 107 23 96 % 161 lb 13.1 oz (73.4 kg)   10/30/20 0315 -- -- -- 68 20 98 % --   10/30/20 0300 (!) 105/49 -- -- 68 21 98 % --         Intake/Output Summary (Last 24 hours) at 10/30/2020 0853  Last data filed at 10/30/2020 0400  Gross per 24 hour   Intake 3143.63 ml   Output 2820 ml   Net 323.63 ml     Wt Readings from Last 2 Encounters:   10/30/20 161 lb 13.1 oz (73.4 kg)   10/22/20 164 lb (74.4 kg)     Body mass index is 27.78 kg/m².         PHYSICAL EXAMINATION:  Constitutional: Intubated, no acute distress  EENT: sclera clear, anicteric, oropharynx clear, no lesions, intubated  Neck: Supple, symmetrical, trachea midline, no adenopathy, thyroid symmetric, no jvd  Respiratory: Diffuse rhonchi in all fields  Cardiovascular: regular rate and rhythm, normal S1, S2  Abdomen: soft, nontender, nondistended, no masses or organomegaly  : Sheikh in place  Neuro: Withdraws from pain, moving all extremities  Extremities:  peripheral pulses normal, no pedal edema, no clubbing or cyanosis      MEDICATIONS:    Scheduled Meds:   sterile water  10 mL Injection Daily    acetaZOLAMIDE  250 mg Intravenous Q12H    midodrine  10 mg Oral TID WC    spironolactone  25 mg Oral Daily    [Held by provider] furosemide  40 mg Intravenous BID    metoprolol tartrate  12.5 mg Oral BID    docusate  100 mg Oral Daily    polyethylene glycol  17 g Oral Daily    cefTRIAXone (ROCEPHIN) IV  2 g Intravenous Q24H    enoxaparin  1 mg/kg Subcutaneous BID    sodium chloride flush  10 mL Intravenous 2 times per day  aspirin  81 mg Oral Daily    hydrocortisone sodium succinate PF  50 mg Intravenous Q8H    ipratropium-albuterol  1 ampule Inhalation Q6H    famotidine (PEPCID) injection  20 mg Intravenous BID    anesthetic and narcotic custom mixture   Intrathecal Once    levothyroxine  150 mcg Oral Daily    pregabalin  300 mg Oral BID    sodium chloride flush  10 mL Intravenous 2 times per day     Continuous Infusions:   amiodarone 0.5 mg/min (10/29/20 1939)    norepinephrine Stopped (10/30/20 0753)    fentaNYL 150 mcg/hr (10/30/20 0409)    midazolam 6 mg/hr (10/30/20 0413)     PRN Meds:   sodium chloride flush, 10 mL, PRN  acetaminophen, 650 mg, Q4H PRN  potassium chloride, 20 mEq, PRN  albuterol, 2.5 mg, As Directed RT PRN  sodium chloride flush, 10 mL, PRN  ondansetron, 4 mg, Q8H PRN  [Held by provider] HYDROcodone 5 mg - acetaminophen, 2 tablet, Q8H PRN      VENT SETTINGS (Comprehensive) (if applicable):  Vent Information  $Ventilation: $Subsequent Day  Skin Assessment: Clean, dry, & intact  Equipment ID: [id number]  Equipment Changed: Expiratory Filter  Vent Type: Servo i  Vent Mode: CPAP  Vt Ordered: 480 mL  Rate Set: 22 bmp  Pressure Support: 8 cmH20  FiO2 : 40 %  SpO2: 97 %  SpO2/FiO2 ratio: 242.5  Sensitivity: 2  PEEP/CPAP: 5  I Time/ I Time %: 0.8 s  Humidification Source: Heated wire  Humidification Temp: 37  Humidification Temp Measured: 37  Circuit Condensation: Drained  Nitric Oxide/Epoprostenol In Use?: No  Mask Type: Full face mask  Mask Size: Small  Additional Respiratory  Assessments  Pulse: 93  Resp: 19  SpO2: 97 %  $End Tidal CO2: 36  pCO2 (TCOM, mmHg): 42 mmHg  Position: Semi-Mcclendon's  Humidification Source: Heated wire  Humidification Temp: 37  Circuit Condensation: Drained  Oral Care Completed?: Yes  Oral Care: Teeth brushed, Mouthwash  Subglottic Suction Done?: No  Cuff Pressure (cm H2O): (mov)    Laboratory findings:    Complete Blood Count:   Recent Labs     10/28/20  0540 10/29/20  0679 10/30/20  0543   WBC 6.5 9.9 8.6   HGB 10.6* 10.4* 10.4*   HCT 34.8* 33.5* 33.8*    235 211        Last 3 Blood Glucose:   Recent Labs     10/28/20  0540 10/29/20  0442 10/30/20  0543   GLUCOSE 134* 126* 148*        PT/INR:    Lab Results   Component Value Date    PROTIME 12.7 10/22/2020    INR 1.0 10/22/2020     PTT:    Lab Results   Component Value Date    APTT 57.1 10/27/2020     Comprehensive Metabolic Profile:   Recent Labs     10/28/20  0540 10/29/20  0442 10/30/20  0543   * 144 142   K 4.3 3.9 3.5*    100 104   CO2 33* 38* 29   BUN 19 24* 23   CREATININE 0.37* 0.47* 0.52   GLUCOSE 134* 126* 148*   CALCIUM 9.2 9.3 9.3   PROT 5.6* 5.6*  --    LABALBU 2.6* 2.6*  --    BILITOT 0.16* 0.29*  --    ALKPHOS 69 68  --    AST 10 15  --    ALT 9 14  --       Magnesium:   Lab Results   Component Value Date    MG 2.4 10/30/2020     Phosphorus: No results found for: PHOS  Ionized Calcium:   Lab Results   Component Value Date    CAION 1.17 10/24/2020      Urinalysis: see chart    Troponin: No results for input(s): TROPONINI in the last 72 hours. Microbiology:    Cultures during this admission:     Blood cultures:                 [] None drawn      [x] Negative             []  Positive (Details:  )  Urine Culture:                   [x] None drawn      [] Negative             []  Positive (Details:  )  Sputum Culture:               [] None drawn       [] Negative             [x]  Positive (Details:  )   Endotracheal aspirate:     [x] None drawn       [] Negative             []  Positive (Details:  )         Radiology/Imaging:     Chest Xray (10/30/2020):   XR CHEST PORTABLE   Final Result   1. Endotracheal tube remains in appropriate position. 2. Bilateral airspace disease again noted, although less prominent. XR CHEST PORTABLE   Final Result   Multifocal interstitial and consolidative infiltrates seen throughout the   lungs bilaterally, with worsening consolidation in the right lung base. XR CHEST PORTABLE   Final Result   1. Endotracheal tube terminates at the level of the alejandro and should be   pulled back approximately 2-3 cm.   2. Redemonstration of coarse, diffuse bilateral interstitial opacities with   slightly increased superimposed hazy airspace opacity, possibly edema or   worsening pneumonia. XR CHEST PORTABLE   Final Result   Coarse bilateral airspace opacities within the upper and lower lobes, not   significantly changed, suggesting a multi lobar pneumonia. XR CHEST PORTABLE   Final Result   Partial clearing of bilateral lung opacities. XR CHEST PORTABLE   Final Result   Increased bilateral lung opacities. XR CHEST PORTABLE   Final Result   Bilateral lung opacities likely pneumonia. Enteric tube with the tip within the proximal stomach. Consider slightly   advancing. XR ABDOMEN FOR NG/OG/NE TUBE PLACEMENT   Final Result   Life-support devices as above. Similar-appearing diffuse bilateral heterogeneous opacities and multifocal   consolidations. XR CHEST PORTABLE   Final Result   Life-support devices as above. Similar-appearing diffuse bilateral heterogeneous opacities and multifocal   consolidations. XR CHEST PORTABLE   Final Result   No significant interval change in multifocal bilateral consolidation, most   concerning for pneumonia. XR CHEST PORTABLE   Final Result   No significant change in the multifocal airspace opacities worrisome for   multifocal pneumonia.               ASSESSMENT:     Patient Active Problem List    Diagnosis Date Noted    Acute metabolic encephalopathy 86/51/6109     Priority: High     Class: Acute    Acute respiratory failure with hypoxia and hypercapnia (HCC)     Elevated troponin     Severe sepsis ([address] 10/22/2020    COPD exacerbation ([address] 10/22/2020    Sepsis due to pneumonia ([address] 08/16/2020    Lactic acidosis 08/16/2020    Septic shock ([address] 08/16/2020    Status post surgery 2018    Pneumonia of both lower lobes due to infectious organism 10/05/2018    Hypothyroidism 10/05/2018    Major depressive disorder 10/05/2018    Chronic midline low back pain without sciatica 10/05/2018    Iron deficiency anemia 10/05/2018       PLAN:     WEAN PER PROTOCOL:  [] No   [x] Yes  [] N/A    DISCONTINUE ANY LABS:   [x] No   [] Yes    ICU PROPHYLAXIS:  Stress ulcer:  [] PPI Agent  [x] [id number] [] Sucralfate  [] Other:  VTE:   [x] Enoxaparin  [] Unfract. Heparin Subcut  [] EPC Cuffs    NUTRITION:  [] NPO [] Tube Feeding (Specify: ) [] TPN  [x] PO (Diet: DIET TUBE FEED CONTINUOUS/CYCLIC NPO; STANDARD WITHOUT FIBER; Nasogastric; Continuous; 10; 65)    HOME MEDICATIONS RECONCILED: [x] No  [] Yes    INSULIN DRIP:   [x] No   [] Yes    CONSULTATION NEEDED:  [x] No   [] Yes    FAMILY UPDATED:    [x] No   [] Yes    TRANSFER OUT OF ICU:   [x] No   [] Yes    ADDITIONAL PLAN:    Neuro  -Fentanyl, versed gtt. Will add oral roxicodone q8 hours, titrate off versed    CV  - A fib: on amiodarone gtt. In NSR. Will transition per cardiology recs  -Cardiomyopathy: EF 30%. Will need lifepak on discharge. Holding lasix for now given hypotension. Cardiology recommends lopressor. -hypotension - midodrine, solucortef. -Possible LV thrombus: 70 mg lovenox BID    Pulm  - Intubated, PRVC 22 / 480 / 8 / 40%  -AB.4 / 46 / 85 / 30  -P:F 213  -CXR this AM.    GI  - TF at goal of 65  - BG 120s-140s, on solucortef. -Pepcid GI ppx  -Benito add suppository for lack of BM today    /Renal  -UOP 1.9 cc/kg/hr. +8.2 L since admit  -Hypokalemia at 3.5. replace per sliding scale. - Creatinine, electrolytes o/w WNL  Lasix tomorrow    ID/Heme  -Pneumonia? ID consulted. Finish azithromycin, on rocephin 2 g.  -WBC stable between 6-9.   -Hb stable around 10    Other  -h/o hypothyroidism.  Cont home synthroid 150 mch QD    Addy Merck, DO    [id number] , [place]  10/30/2020, 8:53 AM    Attending Physician Statement  I have discussed the care of Ashley Valle, including pertinent history and exam findings with the resident. I have reviewed the key elements of all parts of the encounter with the resident. I have seen and examined the patient with the resident. I agree with the assessment and plan and status of the problem list as documented.     I seen the patient during the round today, reviewed chart, labs and medications reviewed ventilator setting and arterial blood gases seen. She has been off Lasix for about 24 hours she received 3 doses of Diamox her bicarbonate is better and her serum bicarbonate is 29 today. Chest x-ray today shows slight increase in bilateral infiltrate and marking. She is on Versed drip and fentanyl drip her blood pressure is fluctuant she become hypotensive and at other times she is hypotensive and required low-dose of Levophed when I saw her at that time she was off Levophed since this morning. She has been off neuromuscular blockade for about 36 to 48 hours now. She is on midodrine she is on Aldactone and she is on hydrocortisone. Her urine output is 3.2 L on Diamox of 3 doses today's the last day of Diamox her last chest   x-raywas better but today chest x-ray shows slight increase marking  Ventilator setting PRVC/22/480/8/40%, 7.42/46/85/29.8   Resume Lasix 20 IV tomorrow  We will try to wean her Versed drip to assess the mentation better. We will try propofol drip if she tolerate. We will try to decrease fentanyl drip as she is on 200 mcg daily and will start her on oxycodone 10 mg 3 times a day. She is on chronic Dilaudid pump and also take oxycodone 10 mg 2-3 times a day. Continue with tube feeding and she is tolerating tube feed. Discussed with  and updated    Discussed with nursing staff, treatment and plan discussed.   Discussed with respiratory therapist.     Total critical care time caring for this patient with life threatening, unstable organ failure, including direct patient contact, management of life support systems, review of data including imaging and labs, discussions with other team members and physicians at least 27  Min so far today, excluding procedures.        Please note that this chart was generated using voice recognition Dragon dictation software.  Although every effort was made to ensure the accuracy of this automated transcription, some errors in transcription may have occurred.   Oh Izquierdo MD  10/30/2020 11:46 AM

## 2020-10-31 ENCOUNTER — APPOINTMENT (OUTPATIENT)
Dept: CT IMAGING | Age: 75
DRG: 870 | End: 2020-10-31
Attending: INTERNAL MEDICINE
Payer: MEDICARE

## 2020-10-31 LAB
ABSOLUTE EOS #: 0.03 K/UL (ref 0–0.44)
ABSOLUTE IMMATURE GRANULOCYTE: 0.13 K/UL (ref 0–0.3)
ABSOLUTE LYMPH #: 0.9 K/UL (ref 1.1–3.7)
ABSOLUTE MONO #: 0.55 K/UL (ref 0.1–1.2)
ALLEN TEST: ABNORMAL
ANION GAP SERPL CALCULATED.3IONS-SCNC: 7 MMOL/L (ref 9–17)
BASOPHILS # BLD: 0 % (ref 0–2)
BASOPHILS ABSOLUTE: <0.03 K/UL (ref 0–0.2)
BUN BLDV-MCNC: 25 MG/DL (ref 8–23)
BUN/CREAT BLD: ABNORMAL (ref 9–20)
CALCIUM SERPL-MCNC: 9.1 MG/DL (ref 8.6–10.4)
CHLORIDE BLD-SCNC: 112 MMOL/L (ref 98–107)
CO2: 28 MMOL/L (ref 20–31)
CREAT SERPL-MCNC: 0.46 MG/DL (ref 0.5–0.9)
DIFFERENTIAL TYPE: ABNORMAL
EOSINOPHILS RELATIVE PERCENT: 0 % (ref 1–4)
FIO2: 40
GFR AFRICAN AMERICAN: >60 ML/MIN
GFR NON-AFRICAN AMERICAN: >60 ML/MIN
GFR SERPL CREATININE-BSD FRML MDRD: ABNORMAL ML/MIN/{1.73_M2}
GFR SERPL CREATININE-BSD FRML MDRD: ABNORMAL ML/MIN/{1.73_M2}
GLUCOSE BLD-MCNC: 111 MG/DL (ref 74–100)
GLUCOSE BLD-MCNC: 113 MG/DL (ref 70–99)
HCT VFR BLD CALC: 31.2 % (ref 36.3–47.1)
HEMOGLOBIN: 9.6 G/DL (ref 11.9–15.1)
IMMATURE GRANULOCYTES: 2 %
LYMPHOCYTES # BLD: 11 % (ref 24–43)
MAGNESIUM: 2.5 MG/DL (ref 1.6–2.6)
MCH RBC QN AUTO: 30.2 PG (ref 25.2–33.5)
MCHC RBC AUTO-ENTMCNC: 30.8 G/DL (ref 28.4–34.8)
MCV RBC AUTO: 98.1 FL (ref 82.6–102.9)
MODE: ABNORMAL
MONOCYTES # BLD: 6 % (ref 3–12)
NEGATIVE BASE EXCESS, ART: ABNORMAL (ref 0–2)
NRBC AUTOMATED: 0 PER 100 WBC
O2 DEVICE/FLOW/%: ABNORMAL
PATIENT TEMP: ABNORMAL
PDW BLD-RTO: 14.2 % (ref 11.8–14.4)
PLATELET # BLD: 223 K/UL (ref 138–453)
PLATELET ESTIMATE: ABNORMAL
PMV BLD AUTO: 10.9 FL (ref 8.1–13.5)
POC HCO3: 29.6 MMOL/L (ref 21–28)
POC O2 SATURATION: 97 % (ref 94–98)
POC PCO2 TEMP: ABNORMAL MM HG
POC PCO2: 46.6 MM HG (ref 35–48)
POC PH TEMP: ABNORMAL
POC PH: 7.41 (ref 7.35–7.45)
POC PO2 TEMP: ABNORMAL MM HG
POC PO2: 92.3 MM HG (ref 83–108)
POSITIVE BASE EXCESS, ART: 4 (ref 0–3)
POTASSIUM SERPL-SCNC: 3.5 MMOL/L (ref 3.7–5.3)
RBC # BLD: 3.18 M/UL (ref 3.95–5.11)
RBC # BLD: ABNORMAL 10*6/UL
SAMPLE SITE: ABNORMAL
SEG NEUTROPHILS: 81 % (ref 36–65)
SEGMENTED NEUTROPHILS ABSOLUTE COUNT: 6.93 K/UL (ref 1.5–8.1)
SODIUM BLD-SCNC: 147 MMOL/L (ref 135–144)
TCO2 (CALC), ART: 31 MMOL/L (ref 22–29)
WBC # BLD: 8.6 K/UL (ref 3.5–11.3)
WBC # BLD: ABNORMAL 10*3/UL

## 2020-10-31 PROCEDURE — 2580000003 HC RX 258: Performed by: STUDENT IN AN ORGANIZED HEALTH CARE EDUCATION/TRAINING PROGRAM

## 2020-10-31 PROCEDURE — 94761 N-INVAS EAR/PLS OXIMETRY MLT: CPT

## 2020-10-31 PROCEDURE — 2000000000 HC ICU R&B

## 2020-10-31 PROCEDURE — 6370000000 HC RX 637 (ALT 250 FOR IP): Performed by: STUDENT IN AN ORGANIZED HEALTH CARE EDUCATION/TRAINING PROGRAM

## 2020-10-31 PROCEDURE — 2700000000 HC OXYGEN THERAPY PER DAY

## 2020-10-31 PROCEDURE — 95708 EEG WO VID EA 12-26HR UNMNTR: CPT

## 2020-10-31 PROCEDURE — 37799 UNLISTED PX VASCULAR SURGERY: CPT

## 2020-10-31 PROCEDURE — 6360000002 HC RX W HCPCS: Performed by: STUDENT IN AN ORGANIZED HEALTH CARE EDUCATION/TRAINING PROGRAM

## 2020-10-31 PROCEDURE — 99233 SBSQ HOSP IP/OBS HIGH 50: CPT | Performed by: INTERNAL MEDICINE

## 2020-10-31 PROCEDURE — 82803 BLOOD GASES ANY COMBINATION: CPT

## 2020-10-31 PROCEDURE — 85025 COMPLETE CBC W/AUTO DIFF WBC: CPT

## 2020-10-31 PROCEDURE — 82947 ASSAY GLUCOSE BLOOD QUANT: CPT

## 2020-10-31 PROCEDURE — 80048 BASIC METABOLIC PNL TOTAL CA: CPT

## 2020-10-31 PROCEDURE — 94640 AIRWAY INHALATION TREATMENT: CPT

## 2020-10-31 PROCEDURE — 2500000003 HC RX 250 WO HCPCS: Performed by: STUDENT IN AN ORGANIZED HEALTH CARE EDUCATION/TRAINING PROGRAM

## 2020-10-31 PROCEDURE — 83735 ASSAY OF MAGNESIUM: CPT

## 2020-10-31 PROCEDURE — 99291 CRITICAL CARE FIRST HOUR: CPT | Performed by: INTERNAL MEDICINE

## 2020-10-31 PROCEDURE — 94770 HC ETCO2 MONITOR DAILY: CPT

## 2020-10-31 PROCEDURE — 70450 CT HEAD/BRAIN W/O DYE: CPT

## 2020-10-31 PROCEDURE — 6370000000 HC RX 637 (ALT 250 FOR IP): Performed by: INTERNAL MEDICINE

## 2020-10-31 PROCEDURE — 94003 VENT MGMT INPAT SUBQ DAY: CPT

## 2020-10-31 RX ADMIN — FAMOTIDINE 20 MG: 10 INJECTION INTRAVENOUS at 21:01

## 2020-10-31 RX ADMIN — ASPIRIN 81 MG: 81 TABLET, CHEWABLE ORAL at 09:06

## 2020-10-31 RX ADMIN — SODIUM CHLORIDE, PRESERVATIVE FREE 10 ML: 5 INJECTION INTRAVENOUS at 21:02

## 2020-10-31 RX ADMIN — OXYCODONE HYDROCHLORIDE 10 MG: 5 TABLET ORAL at 21:01

## 2020-10-31 RX ADMIN — IPRATROPIUM BROMIDE AND ALBUTEROL SULFATE 1 AMPULE: .5; 3 SOLUTION RESPIRATORY (INHALATION) at 07:49

## 2020-10-31 RX ADMIN — AMIODARONE HYDROCHLORIDE 200 MG: 200 TABLET ORAL at 21:01

## 2020-10-31 RX ADMIN — FUROSEMIDE 20 MG: 10 INJECTION, SOLUTION INTRAMUSCULAR; INTRAVENOUS at 09:23

## 2020-10-31 RX ADMIN — SODIUM CHLORIDE, PRESERVATIVE FREE 10 ML: 5 INJECTION INTRAVENOUS at 09:00

## 2020-10-31 RX ADMIN — MIDODRINE HYDROCHLORIDE 10 MG: 5 TABLET ORAL at 12:36

## 2020-10-31 RX ADMIN — Medication 200 MCG/HR: at 01:12

## 2020-10-31 RX ADMIN — PROPOFOL 45 MCG/KG/MIN: 10 INJECTION, EMULSION INTRAVENOUS at 23:43

## 2020-10-31 RX ADMIN — POTASSIUM CHLORIDE 20 MEQ: 29.8 INJECTION, SOLUTION INTRAVENOUS at 06:00

## 2020-10-31 RX ADMIN — CEFTRIAXONE SODIUM 2 G: 2 INJECTION, POWDER, FOR SOLUTION INTRAMUSCULAR; INTRAVENOUS at 10:31

## 2020-10-31 RX ADMIN — Medication 200 MCG/HR: at 06:15

## 2020-10-31 RX ADMIN — IPRATROPIUM BROMIDE AND ALBUTEROL SULFATE 1 AMPULE: .5; 3 SOLUTION RESPIRATORY (INHALATION) at 14:49

## 2020-10-31 RX ADMIN — IPRATROPIUM BROMIDE AND ALBUTEROL SULFATE 1 AMPULE: .5; 3 SOLUTION RESPIRATORY (INHALATION) at 19:48

## 2020-10-31 RX ADMIN — HYDROCORTISONE SODIUM SUCCINATE 50 MG: 100 INJECTION, POWDER, FOR SOLUTION INTRAMUSCULAR; INTRAVENOUS at 09:02

## 2020-10-31 RX ADMIN — FAMOTIDINE 20 MG: 10 INJECTION INTRAVENOUS at 09:24

## 2020-10-31 RX ADMIN — PROPOFOL 40 MCG/KG/MIN: 10 INJECTION, EMULSION INTRAVENOUS at 06:06

## 2020-10-31 RX ADMIN — ENOXAPARIN SODIUM 70 MG: 80 INJECTION SUBCUTANEOUS at 09:28

## 2020-10-31 RX ADMIN — PREGABALIN 300 MG: 100 CAPSULE ORAL at 09:05

## 2020-10-31 RX ADMIN — SPIRONOLACTONE 25 MG: 25 TABLET ORAL at 09:05

## 2020-10-31 RX ADMIN — PROPOFOL 45 MCG/KG/MIN: 10 INJECTION, EMULSION INTRAVENOUS at 19:23

## 2020-10-31 RX ADMIN — OXYCODONE HYDROCHLORIDE 10 MG: 5 TABLET ORAL at 04:24

## 2020-10-31 RX ADMIN — Medication 100 MCG/HR: at 11:28

## 2020-10-31 RX ADMIN — SODIUM CHLORIDE, PRESERVATIVE FREE 10 ML: 5 INJECTION INTRAVENOUS at 21:03

## 2020-10-31 RX ADMIN — POTASSIUM CHLORIDE 20 MEQ: 29.8 INJECTION, SOLUTION INTRAVENOUS at 07:33

## 2020-10-31 RX ADMIN — SODIUM CHLORIDE, PRESERVATIVE FREE 10 ML: 5 INJECTION INTRAVENOUS at 09:45

## 2020-10-31 RX ADMIN — LEVOTHYROXINE SODIUM 150 MCG: 75 TABLET ORAL at 08:03

## 2020-10-31 RX ADMIN — HYDROCORTISONE SODIUM SUCCINATE 50 MG: 100 INJECTION, POWDER, FOR SOLUTION INTRAMUSCULAR; INTRAVENOUS at 21:34

## 2020-10-31 RX ADMIN — DOCUSATE SODIUM 100 MG: 50 LIQUID ORAL at 09:38

## 2020-10-31 RX ADMIN — POLYETHYLENE GLYCOL 3350 17 G: 17 POWDER, FOR SOLUTION ORAL at 09:38

## 2020-10-31 RX ADMIN — PROPOFOL 35 MCG/KG/MIN: 10 INJECTION, EMULSION INTRAVENOUS at 11:41

## 2020-10-31 RX ADMIN — PREGABALIN 300 MG: 100 CAPSULE ORAL at 21:00

## 2020-10-31 RX ADMIN — METOPROLOL TARTRATE 12.5 MG: 25 TABLET ORAL at 09:06

## 2020-10-31 RX ADMIN — ENOXAPARIN SODIUM 70 MG: 80 INJECTION SUBCUTANEOUS at 21:01

## 2020-10-31 RX ADMIN — AMIODARONE HYDROCHLORIDE 200 MG: 200 TABLET ORAL at 09:05

## 2020-10-31 RX ADMIN — OXYCODONE HYDROCHLORIDE 10 MG: 5 TABLET ORAL at 11:49

## 2020-10-31 RX ADMIN — METOPROLOL TARTRATE 12.5 MG: 25 TABLET ORAL at 21:03

## 2020-10-31 ASSESSMENT — PULMONARY FUNCTION TESTS
PIF_VALUE: 14
PIF_VALUE: 8
PIF_VALUE: 13
PIF_VALUE: 14
PIF_VALUE: 13
PIF_VALUE: 9
PIF_VALUE: 23
PIF_VALUE: 15
PIF_VALUE: 22
PIF_VALUE: 9

## 2020-10-31 NOTE — PROCEDURES
[address]  [facility]        CONTINUOUS VIDEO EEG MONITORING           PATIENT: Madhu Abdi  MRN #: [id number]  DATE: 10/31/2020 at 12:33PM to 11/1/2020 at 4:08PM  REFERRING PHYSICIAN:  Kathie Shin MD     BRIEF HISTORY: Patient is a 77year old female who was admitted for difficulty in breathing and somnolence. LTME was ordered to evaluate. AEDs:  Lyrica 300mg BID;  propofol    EEG DESCRIPTION: 21 EEG electrodes were placed according to International 10/20 System. Single EKG electrode was also placed. Video recording was time-locked with EEG recording. BASELINE: The background was in continuous slow in delta and theta frequency of 1-6 Hz, ranging between 10-50uV. There was posterior dominant rhythm at 5-6Hz, symmetric to eye opening/closing. Spontaneous variability was present. Hyperventilation and photic stimulation were not performed. Single lead EKG showed regular, heart rate at 70s per minute. Day 1 - recording started from 10/31/2020 at 12:33PM   AEDs:  Lyrica 300mg BID;  propofol  Interictal: Continuous slow, generalized in delta and theta frequency of 1-6Hz, there was intermittent rhythmic rhythmic slow, generalized in delta frequency, lasted 1-3 seconds. No epileptiform discharges were noted. Ictal: None    Summary: During above recoding period, there was evidence of moderate to severe diffuse encephalopathy. No epileptiform discharges or EEG/clinical seizures were noted. Day 2 -11/1/2020 through 4:08PM  AEDs:  Lyrica 300mg BID;  propofol  Interictal: Continuous slow, generalized in delta and theta frequency of 1-7Hz, there was intermittent rhythmic slow, generalized in delta frequency, lasted 1-3 seconds. Stage II sleep patterns were seen. Ictal: None    Summary: During above recoding period, there was evidence of mild to moderate diffuse encephalopathy.  No epileptiform discharges or EEG/clinical seizures were noted. CLASSIFICATION  Abnormal II (Somnolence)  1. Intermittent rhythmic slow, generalized  2. Background slow    IMPRESSION  The patient underwent continuous video EEG monitoring from 10/31/2020 at 12:33PM to 11/1/2020 at 4:08PM, which showed evidence of mild to moderate diffuse encephalopathy, no epileptiform discharges or EEG/clinical seizures were noted.        Marline Calderon MD, [address] [place] [address], Neurology  Board Certified Epileptologist

## 2020-10-31 NOTE — PROGRESS NOTES
I was called to bedside by nursing. Patient was being weaned off of the propofol and Versed when they noticed that patient was having decorticate posturing. On examination patient was not withdrawing from pain due to sternal rub. Patient was intermittently going in and out of decorticate posturing. Patient did have equal reactive pupils and was unresponsive. Patient is asynchronous with the vent breathing approximately 16 times a minute, heart rate of 92, and blood pressure is 173/73. Stat CT head has been ordered.

## 2020-10-31 NOTE — PROGRESS NOTES
The transport originated from [facility]. Pt. was transported to CT. Assisting with the transport was RN and RT x 2. Appropriate devices were applied to monitor the patient's condition during transport. Patient transported  via 40% O2 via ventilator. Patient tolerated procedure well.         Louie Mcdaniels  3:23 AM

## 2020-10-31 NOTE — PLAN OF CARE
BRONCHOSPASM/BRONCHOCONSTRICTION     [x]         IMPROVE AERATION/BREATH SOUNDS  [x]   ADMINISTER BRONCHODILATOR THERAPY AS APPROPRIATE  [x]   ASSESS BREATH SOUNDS  [x]   IMPLEMENT AEROSOL/MDI PROTOCOL  [x]   PATIENT EDUCATION AS NEEDED     Problem: OXYGENATION/RESPIRATORY FUNCTION  Goal: Patient will maintain patent airway  Outcome: Ongoing  Goal: Patient will achieve/maintain normal respiratory rate/effort  Respiratory rate and effort will be within normal limits for the patient  Outcome: Ongoing    Problem: MECHANICAL VENTILATION  Goal: Patient will maintain patent airway  Outcome: Ongoing  Goal: Oral health is maintained or improved  Outcome: Ongoing  Goal: ET tube will be managed safely  Outcome: Ongoing  Goal: Ability to express needs and understand communication  Outcome: Ongoing  Goal: Mobility/activity is maintained at optimum level for patient  Outcome: Ongoing    Problem: ASPIRATION PRECAUTIONS  Goal: Patients risk of aspiration is minimized  Outcome: Ongoing    Problem: SKIN INTEGRITY  Goal: Skin integrity is maintained or improved  Outcome: Ongoing                   Ventilator Bronchodilator assessment    Breath sounds: rhonchi, rales  Inspiratory Pressure: 23  Plateau Pressure: 23    Patient assessed at level 2          []    Bronchodilator Assessment    BRONCHODILATOR ASSESSMENT SCORE  Score 0 (Home) 1 2 3 4   Breath Sounds   []  Chronic Ventilator: Patient at baseline []  Mild Wheezes/ Clear [x]  Intermittent wheezes with good air entry []  Bilateral/unilateral wheezing with diminished air entry []  Insp/Exp wheeze and/or poor aeration   Ventilator Pressures   []  Chronic Ventilator [x]  Insp. Pressure less than 25 cm H20 [x]  Insp. Pressure less than 25 cm H20 []  Insp. Pressure exceeds 25 cm H20 []  Insp.  Pressure exceeds 30 cm H20   Plateau Pressure []  NA   [x]  Plateau Pressure less than 4  []  Plateau Pressure less than or equal to 5 []  Plateau Pressure greater than or equal to 6 []  [facility] Pressure greater than or equal to 8       JOHNNY BARRIOS  9:02 AM

## 2020-10-31 NOTE — PROGRESS NOTES
morning of 10/22, prompting her emergency department visit. There they performed a chest x-ray as well as CT scan which was negative for any pulmonary embolism but did show bilateral infiltrates. Found to have a lactic acidosis as well, and started on Rocephin and azithromycin. Initially hypercapnic, her mentation improved on BiPAP on 10/22. CURRENT EVALUATION: 10/31/20     Patient evaluated and examined in the ICU. Afebrile  VS stable  Tachycardia improved. Intubated  On Dilaudid drip    In NSR today. NSTEMI, off of heparin drip, new onset reduced systolic dysfunction, EF 66%, apical hypokinesis. Cardiac cath done. Mild CAD. Recommend medical therapy for CAD and dilated cardiomyopathy. IV Lasix on hold due to alkalosis, started on Aldactone. Life vest on discharge. Cardiology following. Currently on Rocephin 2 gm IV q 24 hr (start date 10/23). Expect to stop 11-3-20. UA 10-30-20 shows turbid urine, moderate Hgb, no bacteria. CXR:  · 10/24 increased bilat infiltrates  · 10/26 shows bilateral upper and lower lobe infiltrates unchanged from previous x-ray. · 10-27-20: shows coarse diffuse bilateral infiltrates with superimposed haziness, worse than yesterday. Patient shows fluid shifts. · 10-28-20: Multifocal interstitial and consolidative infiltrates seen throughout the lungs bilaterally, with worsening consolidation in the right lung base. · 10-30-20: Bilateral airspace disease again noted, although less prominent      Labs:    BUN: 15>14>19>24>23  Creatinine: 0.37>0.47>0.52    WBC: 13.7 >14.2>9.7>6.5>9.9>8.6  Hgb: 12 >10.5 >11.3>10.9>10.6>10.4-->9.6  Platelet: 755 >601 >215>265>393>856>564-->156    Lactic acid 6.2 > 2.9 > 2.0>1.7    Cultures:  Urine:  ·   Blood:  · 10/22/20: No growth   Sputum :  · 10/25/20: Klebsiella pneumoniae moderate growth.   Wound:     CSF         Discussed with patient, RN,     I have personally reviewed the past medical history, past surgical history, Not on file    Food insecurity     Worry: Not on file     Inability: Not on file    Transportation needs     Medical: Not on file     Non-medical: Not on file   Tobacco Use    Smoking status: Former Smoker     Last attempt to quit: 1976     Years since quittin.0    Smokeless tobacco: Never Used   Substance and Sexual Activity    Alcohol use: No    Drug use: No    Sexual activity: Not on file   Lifestyle    Physical activity     Days per week: Not on file     Minutes per session: Not on file    Stress: Not on file   Relationships    Social connections     Talks on phone: Not on file     Gets together: Not on file     Attends Anabaptism service: Not on file     Active member of club or organization: Not on file     Attends meetings of clubs or organizations: Not on file     Relationship status: Not on file    Intimate partner violence     Fear of current or ex partner: Not on file     Emotionally abused: Not on file     Physically abused: Not on file     Forced sexual activity: Not on file   Other Topics Concern    Not on file   Social History Narrative    Not on file       Family History:   No family history on file. Allergies:   Patient has no known allergies.      Review of Systems:   Review of Systems   Unable to perform ROS: Intubated          Physical Examination :     Patient Vitals for the past 8 hrs:   BP Temp Temp src Pulse Resp SpO2   10/31/20 1535 -- -- -- 99 17 97 %   10/31/20 1450 -- -- -- -- 16 96 %   10/31/20 1415 -- -- -- 79 15 97 %   10/31/20 1400 (!) 145/67 -- -- 81 15 97 %   10/31/20 1345 -- -- -- 82 17 97 %   10/31/20 1330 -- -- -- 80 16 98 %   10/31/20 1315 (!) 97/45 -- -- 68 21 95 %   10/31/20 1300 (!) 96/47 -- -- 72 21 95 %   10/31/20 1245 -- -- -- 72 21 94 %   10/31/20 1230 -- -- -- 74 20 94 %   10/31/20 1215 -- -- -- 83 14 95 %   10/31/20 1200 (!) 163/58 99.3 °F (37.4 °C) Oral 86 17 95 %   10/31/20 1145 -- -- -- 82 16 96 %   10/31/20 1143 -- -- -- 80 15 96 % 10/31/20 1130 -- -- -- 80 15 97 %   10/31/20 1128 -- -- -- 83 16 98 %   10/31/20 1115 -- -- -- 81 15 97 %   10/31/20 1100 (!) 120/51 -- -- 77 15 96 %   10/31/20 1045 -- -- -- 74 13 96 %   10/31/20 1030 -- -- -- 73 13 95 %   10/31/20 1015 -- -- -- 73 12 96 %   10/31/20 1000 114/81 -- -- 75 13 96 %   10/31/20 0945 -- -- -- 78 13 96 %   10/31/20 0930 -- -- -- 82 13 96 %   10/31/20 0915 -- -- -- 87 14 97 %   10/31/20 0900 (!) 140/65 -- -- 88 13 97 %   10/31/20 0845 -- -- -- 88 12 97 %   10/31/20 0830 -- -- -- 90 15 97 %   10/31/20 0815 -- -- -- 90 18 97 %     Physical Exam  Constitutional:       General: She is not in acute distress. Appearance: Normal appearance. She is obese. She is ill-appearing. HENT:      Head: Normocephalic and atraumatic. Nose: Nose normal. No congestion. Mouth/Throat:      Mouth: Mucous membranes are moist.   Eyes:      General: No scleral icterus. Conjunctiva/sclera: Conjunctivae normal.   Neck:      Musculoskeletal: Neck supple. No neck rigidity. Cardiovascular:      Rate and Rhythm: Normal rate and regular rhythm. Heart sounds: Normal heart sounds. No murmur. Pulmonary:      Effort: No respiratory distress. Breath sounds: Rales present. Abdominal:      General: There is no distension. Palpations: Abdomen is soft. Tenderness: There is no abdominal tenderness. Genitourinary:     Comments: Urine maral  R fem line in good condition  Musculoskeletal:         General: No swelling or tenderness. Skin:     Coloration: Skin is not jaundiced or pale.    Neurological:      Comments: Sedated    Psychiatric:      Comments: Calm on the vent sedated          Medical Decision Making -Laboratory:   I have independently reviewed/ordered the following labs:    CBC with Differential:   Recent Labs     10/30/20  0543 10/31/20  0417   WBC 8.6 8.6   HGB 10.4* 9.6*   HCT 33.8* 31.2*    223   LYMPHOPCT 11* 11*   MONOPCT 6 6     BMP:   Recent Labs 10/30/20  0543 10/31/20  0417    147*   K 3.5* 3.5*    112*   CO2 29 28   BUN 23 25*   CREATININE 0.52 0.46*   MG 2.4 2.5     Hepatic Function Panel:   Recent Labs     10/29/20  0442   PROT 5.6*   LABALBU 2.6*   BILITOT 0.29*   ALKPHOS 68   ALT 14   AST 15     No results for input(s): RPR in the last 72 hours. No results for input(s): HIV in the last 72 hours. No results for input(s): BC in the last 72 hours.   Lab Results   Component Value Date    MUCUS NOT REPORTED 10/30/2020    RBC 3.18 10/31/2020    TRICHOMONAS NOT REPORTED 10/30/2020    WBC 8.6 10/31/2020    YEAST NOT REPORTED 10/30/2020    TURBIDITY TURBID 10/30/2020     Lab Results   Component Value Date    CREATININE 0.46 10/31/2020    GLUCOSE 113 10/31/2020       Medical Decision Making-Imaging:   10-30-20:      10 -28 -20:      10-27-20        10/23/20:            Addy Hernandez MD

## 2020-10-31 NOTE — PLAN OF CARE
Problem: Skin Integrity:  Goal: Will show no infection signs and symptoms  Description: Will show no infection signs and symptoms  Outcome: Ongoing  Goal: Absence of new skin breakdown  Description: Absence of new skin breakdown  Outcome: Ongoing     Problem: Falls - Risk of:  Goal: Will remain free from falls  Description: Will remain free from falls  Outcome: Ongoing  Goal: Absence of physical injury  Description: Absence of physical injury  Outcome: Ongoing     Problem: Pain:  Goal: Pain level will decrease  Description: Pain level will decrease  Outcome: Ongoing  Goal: Control of acute pain  Description: Control of acute pain  Outcome: Ongoing  Goal: Control of chronic pain  Description: Control of chronic pain  Outcome: Ongoing     Problem: OXYGENATION/RESPIRATORY FUNCTION  Goal: Patient will maintain patent airway  10/31/2020 0559 by Mikayla Butterfield RN  Outcome: Ongoing  10/30/2020 2110 by Lorelei James RCP  Outcome: Ongoing  Goal: Patient will achieve/maintain normal respiratory rate/effort  Description: Respiratory rate and effort will be within normal limits for the patient  10/31/2020 0559 by Mikayla Butterfield RN  Outcome: Ongoing  10/30/2020 2110 by Lorelei James RCP  Outcome: Ongoing     Problem: MECHANICAL VENTILATION  Goal: Patient will maintain patent airway  10/31/2020 0559 by Mikayla Butterfield RN  Outcome: Ongoing  10/30/2020 2110 by Lorelei James RCP  Outcome: Ongoing  Goal: Oral health is maintained or improved  10/31/2020 0559 by Mikayla Butterfield RN  Outcome: Ongoing  10/30/2020 2110 by Lorelei James RCP  Outcome: Ongoing  Goal: ET tube will be managed safely  10/31/2020 0559 by Mikayla Butterfield RN  Outcome: Ongoing  10/30/2020 2110 by Lorelei James RCP  Outcome: Ongoing  Goal: Ability to express needs and understand communication  10/31/2020 0559 by Mikayla Butterfield RN  Outcome: Ongoing  10/30/2020 2110 by Lorelei James RCP  Outcome: Ongoing  Goal: Mobility/activity is maintained at Problem: Psychomotor Activity - Altered:  Goal: Absence of psychomotor disturbance signs and symptoms  Description: Absence of psychomotor disturbance signs and symptoms  Outcome: Ongoing     Problem: Sensory Perception - Impaired:  Goal: Demonstrations of improved sensory functioning will increase  Description: Demonstrations of improved sensory functioning will increase  Outcome: Ongoing  Goal: Decrease in sensory misperception frequency  Description: Decrease in sensory misperception frequency  Outcome: Ongoing  Goal: Able to refrain from responding to false sensory perceptions  Description: Able to refrain from responding to false sensory perceptions  Outcome: Ongoing  Goal: Demonstrates accurate environmental perceptions  Description: Demonstrates accurate environmental perceptions  Outcome: Ongoing  Goal: Able to distinguish between reality-based and nonreality-based thinking  Description: Able to distinguish between reality-based and nonreality-based thinking  Outcome: Ongoing  Goal: Able to interrupt nonreality-based thinking  Description: Able to interrupt nonreality-based thinking  Outcome: Ongoing     Problem: Sleep Pattern Disturbance:  Goal: Appears well-rested  Description: Appears well-rested  Outcome: Ongoing

## 2020-10-31 NOTE — PROGRESS NOTES
[facility]   Progress Note                   Date:   10/31/2020  Patient name: Anni Holt  Date of admission:  10/22/2020  6:58 PM  MRN:   [id number]  YOB: 1945  PCP: Maia Da Silva MD    Reason for Admission:     Subjective:     She remains intubated and on Dilaudid drip  In NSR.   On Lovenox full dose  EF 25 to 30%      Intake/Output Summary (Last 24 hours) at 10/31/2020 0952  Last data filed at 10/31/2020 0944  Gross per 24 hour   Intake 3140.27 ml   Output 2875 ml   Net 265.27 ml       Medications:   Scheduled Meds:   amiodarone  200 mg Oral BID    [Held by provider] furosemide  20 mg Intravenous Daily    hydrocortisone sodium succinate PF  50 mg Intravenous Q12H    oxyCODONE  10 mg Oral Q8H    fleet  1 enema Rectal Once    midodrine  10 mg Oral TID WC    spironolactone  25 mg Oral Daily    metoprolol tartrate  12.5 mg Oral BID    docusate  100 mg Oral Daily    polyethylene glycol  17 g Oral Daily    cefTRIAXone (ROCEPHIN) IV  2 g Intravenous Q24H    enoxaparin  1 mg/kg Subcutaneous BID    sodium chloride flush  10 mL Intravenous 2 times per day    aspirin  81 mg Oral Daily    ipratropium-albuterol  1 ampule Inhalation Q6H    famotidine (PEPCID) injection  20 mg Intravenous BID    anesthetic and narcotic custom mixture   Intrathecal Once    levothyroxine  150 mcg Oral Daily    pregabalin  300 mg Oral BID    sodium chloride flush  10 mL Intravenous 2 times per day     Continuous Infusions:   propofol 50 mcg/kg/min (10/31/20 0827)    norepinephrine Stopped (10/30/20 2017)    fentaNYL 200 mcg/hr (10/31/20 0615)     CBC:   Recent Labs     10/29/20  0442 10/30/20  0543 10/31/20  0417   WBC 9.9 8.6 8.6   HGB 10.4* 10.4* 9.6*    211 223     BMP:    Recent Labs     10/29/20  0442 10/30/20  0543 10/31/20  0417    142 147*   K 3.9 3.5* 3.5*    104 112*   CO2 38* 29 28   BUN 24* 23 25*   CREATININE 0.47* 0.52 0.46*   GLUCOSE 126* 148* 113* Hepatic:   Recent Labs     10/29/20  0442   AST 15   ALT 14   BILITOT 0.29*   ALKPHOS 68     Troponin: No results for input(s): TROPONINI in the last 72 hours. BNP: No results for input(s): BNP in the last 72 hours. Lipids: No results for input(s): CHOL, HDL in the last 72 hours. Invalid input(s): LDLCALCU  INR: No results for input(s): INR in the last 72 hours. Objective:   Vitals: BP (!) 152/76   Pulse 82   Temp 98.6 °F (37 °C) (Oral)   Resp 15   Ht 5' 4\" (1.626 m)   Wt 157 lb 13.6 oz (71.6 kg)   SpO2 98%   BMI 27.09 kg/m²   Constitutional and General Appearance: intubated    Respiratory:  · Clear to auscultation bilaterally, with equal air entry  Cardiovascular:  · S1, s2, rrr, no pain on palpation of anterior chest wall. Abdomen:   · No masses or tenderness, soft abdomen  · Bowel sounds present  Extremities:  · No le edema, peripheral pulse bl le present 2 +  Integumentum:   Intact with no rashes noted    EKG:   Atrial flutter and T wave inversion in anterior leads on initial EKG  Normal sinus rhythm now     ECHO:10/22  Left ventricle is normal in size. Global left ventricular systolic function  is severely reduced. Estimated ejection fraction is 25-30 % . Calculated EF via heart model is 30 %. The apex appears akinetic. Cannot exclude an apical thrombus, consider  Definity. Mild mitral regurgitation. Mild to moderate tricuspid regurgitation. Estimated right ventricular  systolic pressure is 35 mmHg. IVC dilated but unable to assess respiratory collapse due to patient on  ventilator.     Cardiac Angiography:10/26    LMCA: Mild irregularities 10-20%.     LAD: Mild irregularities 10-20%.     LCx: Mild irregularities 10-20%.     RCA: Mild irregularities 10-20%.     Coronary Tree      Dominance: Right     Estimated Blood Loss: 10 mL     Conclusions:      Mild cAD.        Recommendations:     Medical therapy for CAD and dilated cardiomyopathy.   Reassess EF in 3 months      Assessment /

## 2020-10-31 NOTE — PLAN OF CARE
Problem: OXYGENATION/RESPIRATORY FUNCTION  Goal: Patient will maintain patent airway  10/30/2020 2110 by Jose Martinez RCP  Outcome: Ongoing     Problem: OXYGENATION/RESPIRATORY FUNCTION  Goal: Patient will achieve/maintain normal respiratory rate/effort  Description: Respiratory rate and effort will be within normal limits for the patient  10/30/2020 2110 by Jose Martinez RCP  Outcome: Ongoing     Problem: MECHANICAL VENTILATION  Goal: Patient will maintain patent airway  10/30/2020 2110 by Jose Martinez RCP  Outcome: Ongoing     Problem: MECHANICAL VENTILATION  Goal: Oral health is maintained or improved  10/30/2020 2110 by Jose Martinez RCP  Outcome: Ongoing     Problem: MECHANICAL VENTILATION  Goal: ET tube will be managed safely  10/30/2020 2110 by Jose Martinez RCP  Outcome: Ongoing     Problem: MECHANICAL VENTILATION  Goal: Ability to express needs and understand communication  10/30/2020 2110 by Jose Martinez RCP  Outcome: Ongoing     Problem: MECHANICAL VENTILATION  Goal: Mobility/activity is maintained at optimum level for patient  10/30/2020 2110 by Jose Martinez RCP  Outcome: Ongoing     Problem: SKIN INTEGRITY  Goal: Skin integrity is maintained or improved  10/30/2020 2110 by Jose Martinez RCP  Outcome: Ongoing

## 2020-10-31 NOTE — PROGRESS NOTES
Patient was evaluated by [facility] neuro critical care resident I did not evaluate this patient on 10/31/2020

## 2020-10-31 NOTE — CONSULTS
Neuro Critical Care Consult Note    Reason for Consult: decorticate posturing   Requesting Physician:  Jonah Bhatti DO   Attending Physician: Jane Galan    History Obtained From:  electronic medical record    CHIEF COMPLAINT:       PNA, sepsis     HISTORY OF PRESENT ILLNESS:       The patient is a 77 y.o. female with history of hypothyroidism, COPD, iron deficiency anemia who was admitted with pneumonia and altered mental status. CT scan initially showed bilateral infiltrates. She has been treated with Rocephin and Zithromax. On presentation she was lethargic, somnolent but reportedly was able to follow commands and able to answer questions like saying the year is 2020, president is Tir[place]. Patient was intubated and sedated. This morning she was weaned off propofol and Versed and was noticed to have decorticate posturing. Stat CT head was done that showed no pertinent findings.   She is currently on propofol 45 mics per hour and fentanyl 200 mics per hour     PAST MEDICAL HISTORY :       Past Medical History:        Diagnosis Date    COPD (chronic obstructive pulmonary disease) ([facility] [address]     Depression     GERD (gastroesophageal reflux disease)     Osteoporosis        Past Surgical History:        Procedure Laterality Date    BACK SURGERY      BREAST SURGERY      CARPAL TUNNEL RELEASE      FRACTURE SURGERY Left 12/20/2018    ORIF wrist    HYSTERECTOMY      JOINT REPLACEMENT      right knee    JOINT REPLACEMENT      WRIST FRACTURE SURGERY Left 12/20/2018    WRIST OPEN REDUCTION INTERNAL FIXATION-DISTAL RADIUS performed by Brendan Neville MD at [address] [place] [address] History:   Social History     Socioeconomic History    Marital status:      Spouse name: Not on file    Number of children: Not on file    Years of education: Not on file    Highest education level: Not on file   Occupational History    Not on file   Social Needs    Financial resource strain: Not on file    Food insecurity     Worry: Not on file     Inability: Not on file    Transportation needs     Medical: Not on file     Non-medical: Not on file   Tobacco Use    Smoking status: Former Smoker     Last attempt to quit: 1976     Years since quittin.0    Smokeless tobacco: Never Used   Substance and Sexual Activity    Alcohol use: No    Drug use: No    Sexual activity: Not on file   Lifestyle    Physical activity     Days per week: Not on file     Minutes per session: Not on file    Stress: Not on file   Relationships    Social connections     Talks on phone: Not on file     Gets together: Not on file     Attends Yarsani service: Not on file     Active member of club or organization: Not on file     Attends meetings of clubs or organizations: Not on file     Relationship status: Not on file    Intimate partner violence     Fear of current or ex partner: Not on file     Emotionally abused: Not on file     Physically abused: Not on file     Forced sexual activity: Not on file   Other Topics Concern    Not on file   Social History Narrative    Not on file       Family History:   No family history on file. Allergies:  Patient has no known allergies. Home Medications:  Prior to Admission medications    Medication Sig Start Date End Date Taking? Authorizing Provider   furosemide (LASIX) 40 MG tablet Take 1 tablet by mouth as needed (edema) 20   RYAN Ervin - CNP   Hydroxychloroquine Sulfate (PLAQUENIL PO) Take by mouth    Historical Provider, MD   oxyCODONE-acetaminophen (PERCOCET) 5-325 MG per tablet Take 1 tablet by mouth every 4 hours as needed for Pain.     Historical Provider, MD   NONFORMULARY by Intrathecal route continuous Bupivicaine 5.593 mg/day  Hydromorphone 2.797 mg/day  Refilled 10.06.2020 by Cyn Otto CNP 10/6/20 12/9/20  RYAN Owens CNP   albuterol sulfate  (90 Base) MCG/ACT inhaler Inhale 2 puffs into the lungs 4 times daily 10/8/18   Tasha Santos PA-C   levothyroxine (SYNTHROID) 150 MCG tablet Take 150 mcg by mouth Daily    Historical Provider, MD   aspirin 81 MG tablet Take 81 mg by mouth daily    Historical Provider, MD   DULoxetine (CYMBALTA) 60 MG extended release capsule Take 60 mg by mouth daily    Historical Provider, MD   traZODone (DESYREL) 100 MG tablet Take 200 mg by mouth nightly     Historical Provider, MD   pantoprazole sodium (PROTONIX) 40 MG PACK packet Take 40 mg by mouth 2 times daily (before meals)    Historical Provider, MD   potassium chloride (KLOR-CON) 20 MEQ packet Take 20 mEq by mouth 2 times daily    Historical Provider, MD   pregabalin (LYRICA) 100 MG capsule Take 300 mg by mouth 2 times daily. Jerrell Holder Historical Provider, MD   calcium citrate-vitamin D (CITRICAL + D) 315-250 MG-UNIT TABS per tablet Take 1 tablet by mouth 2 times daily (with meals)    Historical Provider, MD   HYDROcodone-acetaminophen (NORCO)  MG per tablet Take 1 tablet by mouth every 8 hours as needed for Pain. Jerrell Holder     Historical Provider, MD   alendronate (FOSAMAX) 70 MG tablet Take 70 mg by mouth every 7 days    Historical Provider, MD       Current Medications:   Current Facility-Administered Medications: amiodarone (CORDARONE) tablet 200 mg, 200 mg, Oral, BID  furosemide (LASIX) injection 20 mg, 20 mg, Intravenous, Daily  hydrocortisone sodium succinate PF (SOLU-CORTEF) injection 50 mg, 50 mg, Intravenous, Q12H  oxyCODONE (ROXICODONE) immediate release tablet 10 mg, 10 mg, Oral, Q8H  fleet rectal enema 1 enema, 1 enema, Rectal, Once  bisacodyl (DULCOLAX) suppository 10 mg, 10 mg, Rectal, Daily PRN  propofol injection, 10 mcg/kg/min, Intravenous, Titrated  midodrine (PROAMATINE) tablet 10 mg, 10 mg, Oral, TID WC  spironolactone (ALDACTONE) tablet 25 mg, 25 mg, Oral, Daily  metoprolol tartrate (LOPRESSOR) tablet 12.5 mg, 12.5 mg, Oral, BID  docusate (COLACE) 50 MG/5ML liquid 100 mg, 100 mg, Oral, Daily  polyethylene glycol (GLYCOLAX) packet 17 g, 17 g, Oral, Daily  cefTRIAXone (ROCEPHIN) 2 g IVPB in D5W 50ml minibag, 2 g, Intravenous, Q24H  enoxaparin (LOVENOX) injection 70 mg, 1 mg/kg, Subcutaneous, BID  sodium chloride flush 0.9 % injection 10 mL, 10 mL, Intravenous, 2 times per day  sodium chloride flush 0.9 % injection 10 mL, 10 mL, Intravenous, PRN  acetaminophen (TYLENOL) tablet 650 mg, 650 mg, Oral, Q4H PRN  aspirin chewable tablet 81 mg, 81 mg, Oral, Daily  potassium chloride 20 mEq/50 mL IVPB (Central Line), 20 mEq, Intravenous, PRN  albuterol (PROVENTIL) nebulizer solution 2.5 mg, 2.5 mg, Nebulization, As Directed RT PRN  ipratropium-albuterol (DUONEB) nebulizer solution 1 ampule, 1 ampule, Inhalation, Q6H  norepinephrine (LEVOPHED) 16 mg in sodium chloride 0.9 % 250 mL infusion, 2 mcg/min, Intravenous, Continuous  fentaNYL 20 mcg/mL Infusion, 100 mcg/hr, Intravenous, Continuous  famotidine (PEPCID) injection 20 mg, 20 mg, Intravenous, BID  bupivacaine (PF) (MARCAINE) 5.5 mg, HYDROmorphone (DILAUDID) 2.797 mg, , Intrathecal, Once  levothyroxine (SYNTHROID) tablet 150 mcg, 150 mcg, Oral, Daily  pregabalin (LYRICA) capsule 300 mg, 300 mg, Oral, BID  sodium chloride flush 0.9 % injection 10 mL, 10 mL, Intravenous, 2 times per day  sodium chloride flush 0.9 % injection 10 mL, 10 mL, Intravenous, PRN  ondansetron (ZOFRAN) injection 4 mg, 4 mg, Intravenous, Q8H PRN    REVIEW OF SYSTEMS:       Intubated/sedated    PHYSICAL EXAM:       BP (!) 152/76   Pulse 85   Temp 98.1 °F (36.7 °C) (Oral)   Resp 15   Ht 5' 4\" (1.626 m)   Wt 157 lb 13.6 oz (71.6 kg)   SpO2 100%   BMI 27.09 kg/m²     General Examination  Level of consciousness: 11T  pattern of breathing: Breathing over the vent    Brain Stem Assessment  Normal pupillary response  Normal corneal Reflex  Eye movement:  -Spontaneous: present   -Oculocephalic reflex: present   -Cough reflex: present  -Gag reflex: present    motor responses  Movements. Following commands. No posturing  Normal tone   Negative corea  Absent clonus   Present and symmetrical deep tendon reflexes (+1)  planatar relexes are down going bilaterally   No involuntary movements such as subtle signs of seizures and myoclonus  No evidence of asymmetry             LABS AND IMAGING:     CBC with Differential:    Lab Results   Component Value Date    WBC 8.6 10/31/2020    RBC 3.18 10/31/2020    HGB 9.6 10/31/2020    HCT 31.2 10/31/2020     10/31/2020    MCV 98.1 10/31/2020    MCH 30.2 10/31/2020    MCHC 30.8 10/31/2020    RDW 14.2 10/31/2020    METASPCT 1 08/20/2020    LYMPHOPCT 11 10/31/2020    MONOPCT 6 10/31/2020    BASOPCT 0 10/31/2020    MONOSABS 0.55 10/31/2020    LYMPHSABS 0.90 10/31/2020    EOSABS 0.03 10/31/2020    BASOSABS <0.03 10/31/2020    DIFFTYPE NOT REPORTED 10/31/2020     BMP:    Lab Results   Component Value Date     10/31/2020    K 3.5 10/31/2020     10/31/2020    CO2 28 10/31/2020    BUN 25 10/31/2020    LABALBU 2.6 10/29/2020    CREATININE 0.46 10/31/2020    CALCIUM 9.1 10/31/2020    GFRAA >60 10/31/2020    LABGLOM >60 10/31/2020    GLUCOSE 113 10/31/2020       Radiology Review:      CTH w/o (10/31/2020): No acute intracranial abnormality. MRI may be obtained if clinically indicated. CXR: 1.  Endotracheal and enteric tubes remain in place. Interval right IJ central venous catheter with distal tip at the mid SVC. 2. Diffuse hazy airspace and interstitial opacities in the lungs are slightly increased when compared to the radiograph performed earlier this morning. LTM EEG:   Interictal: Continuous slow, generalized in delta and theta   frequency of 1-6Hz, there was intermittent rhythmic slow,   generalized in delta frequency, lasted 1-3 seconds.      ASSESSMENT AND PLAN:       Toxic metabolic encephalopathy due to pneumosepsis    Continue LTM EEG for 24 hours  Continue medical management per MICU  Wean off propofol, fentanyl as tolerated    We will continue to follow along. For any changes in exam or patient status please contact Neuro Critical Care.         Electronically signed by Edgar Martinez MD PGY-3, Neurology on 10/31/2020 at 7:10 PM

## 2020-10-31 NOTE — PLAN OF CARE
Problem: OXYGENATION/RESPIRATORY FUNCTION  Goal: Patient will maintain patent airway  10/31/2020 1954 by Josué Yeh RCP  Outcome: Ongoing     Problem: OXYGENATION/RESPIRATORY FUNCTION  Goal: Patient will achieve/maintain normal respiratory rate/effort  Description: Respiratory rate and effort will be within normal limits for the patient  10/31/2020 1954 by Josué Yeh RCP  Outcome: Ongoing     Problem: MECHANICAL VENTILATION  Goal: Patient will maintain patent airway  10/31/2020 1954 by Josué Yeh RCP  Outcome: Ongoing     Problem: MECHANICAL VENTILATION  Goal: Oral health is maintained or improved  10/31/2020 1954 by Josué Yeh RCP  Outcome: Ongoing     Problem: MECHANICAL VENTILATION  Goal: ET tube will be managed safely  10/31/2020 1954 by Josué Yeh RCP  Outcome: Ongoing     Problem: MECHANICAL VENTILATION  Goal: Ability to express needs and understand communication  10/31/2020 1954 by Josué Yeh RCP  Outcome: Ongoing     Problem: MECHANICAL VENTILATION  Goal: Mobility/activity is maintained at optimum level for patient  10/31/2020 1954 by Josué Yeh RCP  Outcome: Ongoing     Problem: SKIN INTEGRITY  Goal: Skin integrity is maintained or improved  10/31/2020 1954 by Josué Yeh RCP  Outcome: Ongoing    BRONCHOSPASM/BRONCHOCONSTRICTION     [x]         IMPROVE AERATION/BREATH SOUNDS  [x]   ADMINISTER BRONCHODILATOR THERAPY AS APPROPRIATE  [x]   ASSESS BREATH SOUNDS  [x]   IMPLEMENT AEROSOL/MDI PROTOCOL  [x]   PATIENT EDUCATION AS NEEDED     PROVIDE ADEQUATE OXYGENATION WITH ACCEPTABLE SP02/ABG'S    [x]  IDENTIFY APPROPRIATE OXYGEN THERAPY  [x]   MONITOR SP02/ABG'S AS NEEDED   [x]   PATIENT EDUCATION AS NEEDED

## 2020-10-31 NOTE — PLAN OF CARE
Problem: Skin Integrity:  Goal: Will show no infection signs and symptoms  Description: Will show no infection signs and symptoms  10/31/2020 1022 by Noel Caldwell RN  Outcome: Ongoing  10/31/2020 0559 by Natalie Meza RN  Outcome: Ongoing  Goal: Absence of new skin breakdown  Description: Absence of new skin breakdown  10/31/2020 1022 by Noel Caldwell RN  Outcome: Ongoing  10/31/2020 0559 by Natalie Meza RN  Outcome: Ongoing     Problem: Falls - Risk of:  Goal: Will remain free from falls  Description: Will remain free from falls  10/31/2020 1022 by Noel Caldwell RN  Outcome: Ongoing  10/31/2020 0559 by Natalie Meza RN  Outcome: Ongoing  Goal: Absence of physical injury  Description: Absence of physical injury  10/31/2020 1022 by Noel Caldwell RN  Outcome: Ongoing  10/31/2020 0559 by Natalie Meza RN  Outcome: Ongoing     Problem: Pain:  Goal: Pain level will decrease  Description: Pain level will decrease  10/31/2020 1022 by Noel Caldwell RN  Outcome: Ongoing  10/31/2020 0559 by Natalie Meza RN  Outcome: Ongoing  Goal: Control of acute pain  Description: Control of acute pain  10/31/2020 1022 by Noel Caldwell RN  Outcome: Ongoing  10/31/2020 0559 by Natalie Meza RN  Outcome: Ongoing  Goal: Control of chronic pain  Description: Control of chronic pain  10/31/2020 1022 by Noel Caldwell RN  Outcome: Ongoing  10/31/2020 0559 by Natalie Meza RN  Outcome: Ongoing     Problem: OXYGENATION/RESPIRATORY FUNCTION  Goal: Patient will maintain patent airway  10/31/2020 0559 by Natalie Meza RN  Outcome: Ongoing  10/30/2020 2110 by Shirley Frances RCP  Outcome: Ongoing  Goal: Patient will achieve/maintain normal respiratory rate/effort  Description: Respiratory rate and effort will be within normal limits for the patient  10/31/2020 0559 by Natalie Meza RN  Outcome: Ongoing  10/30/2020 2110 by Shirley Frances RCP  Outcome: Ongoing     Problem: MECHANICAL VENTILATION  Goal: Patient will maintain patent airway  10/31/2020 0559 by Meryle Leeks, RN  Outcome: Ongoing  10/30/2020 2110 by Radha Freeman RCP  Outcome: Ongoing  Goal: Oral health is maintained or improved  10/31/2020 0559 by Meryle Leeks, RN  Outcome: Ongoing  10/30/2020 2110 by Radha Freeman RCP  Outcome: Ongoing  Goal: ET tube will be managed safely  10/31/2020 0559 by Meryle Leeks, RN  Outcome: Ongoing  10/30/2020 2110 by Radha Freeman RCP  Outcome: Ongoing  Goal: Ability to express needs and understand communication  10/31/2020 0559 by Meryle Leeks, RN  Outcome: Ongoing  10/30/2020 2110 by Radha Freeman RCP  Outcome: Ongoing  Goal: Mobility/activity is maintained at optimum level for patient  10/31/2020 0559 by Meryle Leeks, RN  Outcome: Ongoing  10/30/2020 2110 by Radha Freeman RCP  Outcome: Ongoing     Problem: SKIN INTEGRITY  Goal: Skin integrity is maintained or improved  10/31/2020 1022 by Evie George RN  Outcome: Ongoing  10/31/2020 0559 by Meryle Leeks, RN  Outcome: Ongoing  10/30/2020 2110 by Radha Freeman RCP  Outcome: Ongoing     Problem: Nutrition  Goal: Optimal nutrition therapy  Description: Nutrition Problem #1: Inadequate oral intake  Intervention: Food and/or Nutrient Delivery: Start nutrition as able; if TF, suggest Standard without Fiber goal 65 mL/hr to provide 1872 kcal and 87 g pro/day.   Nutritional Goals: meet % of estimated nutrient needs     10/31/2020 1022 by Evie George RN  Outcome: Ongoing  10/31/2020 0559 by Meryle Leeks, RN  Outcome: Ongoing     Problem: Confusion - Acute:  Goal: Absence of continued neurological deterioration signs and symptoms  Description: Absence of continued neurological deterioration signs and symptoms  10/31/2020 1022 by Evie George RN  Outcome: Ongoing  10/31/2020 0559 by Meryle Leeks, RN  Outcome: Ongoing  Goal: Mental status will be restored to baseline  Description: Mental status will be restored to baseline  10/31/2020 1022 by

## 2020-10-31 NOTE — PROGRESS NOTES
Critical Care Team - Daily Progress Note      Date and time: 10/31/2020 8:59 AM  Patient's name:  Jer Minor Record Number: [id number]  Patient's account/billing number: [de-identified]  Patient's YOB: 1945  Age: 77 y.o. Date of Admission: 10/22/2020  6:58 PM  Length of stay during current admission: 9      Primary Care Physician: Serena Ferris MD  ICU Attending Physician: Dr. Sabrina Radford Status: Full Code    Reason for ICU admission: CHF vs pneumonia. Respiratory failure      SUBJECTIVE:     OVERNIGHT EVENTS:       Patient possible her decorticate posturing. Neuro critical care was consulted. Weaned off of Versed is now propofol and fentanyl.     AWAKE & FOLLOWING COMMANDS:  [x] No   [] Yes    CURRENT VENTILATION STATUS:     [x] Ventilator  [] BIPAP  [] Nasal Cannula [] Room Air      IF INTUBATED, ET TUBE MARKING AT LOWER LIP:       cms    SECRETIONS Amount:  [] Small [] Moderate  [] Large  [] None  Color:     [] White [] Colored  [] Bloody    SEDATION:  RAAS Score:  [x] Propofol gtt  [] Versed gtt  [] Ativan gtt   [] No Sedation    PARALYZED:  [x] No    [] Yes    DIARRHEA:                [x] No                [] Yes  (C. Difficile status: [] positive                                                                                                                       [] negative                                                                                                                     [] pending)    VASOPRESSORS:  [x] No    [] Yes    If yes -   [] Levophed       [] Dopamine     [] Vasopressin       [] Dobutamine  [] Phenylephrine         [] Epinephrine    CENTRAL LINES:     [] No   [x] Yes   (Date of Insertion: 10/30 )           If yes -     [x] Right IJ     [] Left IJ [] Right Femoral [] Left Femoral                   [] Right Subclavian [] Left Subclavian       LUNA'S CATHETER:   [] No   [x] Yes  (Date of Insertion:  10/22 )     URINE OUTPUT:            [x] Good   [] Low [] Anuric      OBJECTIVE:     VITAL SIGNS:  BP (!) 152/76   Pulse 82   Temp 98.6 °F (37 °C) (Oral)   Resp 15   Ht 5' 4\" (1.626 m)   Wt 157 lb 13.6 oz (71.6 kg)   SpO2 98%   BMI 27.09 kg/m²     Tmax over 24 hours:  Temp (24hrs), Av °F (37.2 °C), Min:98.1 °F (36.7 °C), Max:99.9 °F (37.7 °C)      Patient Vitals for the past 6 hrs:   BP Temp Temp src Pulse Resp SpO2 Weight   10/31/20 0800 -- 98.6 °F (37 °C) Oral -- -- -- --   10/31/20 0744 -- -- -- 82 15 98 % --   10/31/20 0600 (!) 152/76 -- -- 85 15 100 % --   10/31/20 0500 126/60 -- -- 73 21 99 % --   10/31/20 0417 -- -- -- -- 21 99 % --   10/31/20 0400 122/63 98.1 °F (36.7 °C) Oral 77 14 97 % 157 lb 13.6 oz (71.6 kg)   10/31/20 0321 -- -- -- 82 20 96 % --   10/31/20 0300 (!) 152/63 -- -- -- -- -- --         Intake/Output Summary (Last 24 hours) at 10/31/2020 0859  Last data filed at 10/31/2020 0737  Gross per 24 hour   Intake 2209 ml   Output 2875 ml   Net -666 ml     Wt Readings from Last 2 Encounters:   10/31/20 157 lb 13.6 oz (71.6 kg)   10/22/20 164 lb (74.4 kg)     Body mass index is 27.09 kg/m². PHYSICAL EXAMINATION:  General appearance - Intubated, sedated. Mental status +corneals,+ cough/gag. Does not follow commands. Will have flexion of her upper extremities  Eyes - pupils equal and reactive  Chest - clear to auscultation, no wheezes, rales or rhonchi, symmetric air entry  Heart - normal rate and regular rhythm  Abdomen - soft, nontender, nondistended, no masses or organomegaly  Neurological - +corneals,+ cough/gag. Does not follow commands.   Will have flexion of her upper extremities  Extremities - no pedal edema noted  Skin - No rashes over exposed skin     MEDICATIONS:    Scheduled Meds:   amiodarone  200 mg Oral BID    furosemide  20 mg Intravenous Daily    hydrocortisone sodium succinate PF  50 mg Intravenous Q12H    oxyCODONE  10 mg Oral Q8H    fleet  1 enema Rectal Once    midodrine  10 mg Oral TID WC    spironolactone  25 mg Oral Daily    metoprolol tartrate  12.5 mg Oral BID    docusate  100 mg Oral Daily    polyethylene glycol  17 g Oral Daily    cefTRIAXone (ROCEPHIN) IV  2 g Intravenous Q24H    enoxaparin  1 mg/kg Subcutaneous BID    sodium chloride flush  10 mL Intravenous 2 times per day    aspirin  81 mg Oral Daily    ipratropium-albuterol  1 ampule Inhalation Q6H    famotidine (PEPCID) injection  20 mg Intravenous BID    anesthetic and narcotic custom mixture   Intrathecal Once    levothyroxine  150 mcg Oral Daily    pregabalin  300 mg Oral BID    sodium chloride flush  10 mL Intravenous 2 times per day     Continuous Infusions:   propofol 50 mcg/kg/min (10/31/20 0827)    norepinephrine Stopped (10/30/20 2017)    fentaNYL 200 mcg/hr (10/31/20 0615)     PRN Meds:   bisacodyl, 10 mg, Daily PRN  sodium chloride flush, 10 mL, PRN  acetaminophen, 650 mg, Q4H PRN  potassium chloride, 20 mEq, PRN  albuterol, 2.5 mg, As Directed RT PRN  sodium chloride flush, 10 mL, PRN  ondansetron, 4 mg, Q8H PRN          VENT SETTINGS (Comprehensive) (if applicable):  Vent Information  $Ventilation: $Subsequent Day  Skin Assessment: Clean, dry, & intact  Equipment ID: [id number]  Equipment Changed: Suction catheter  Vent Type: Servo i  Vent Mode: (S) CPAP  Vt Ordered: 480 mL  Rate Set: 22 bmp  Pressure Support: (S) 8 cmH20  FiO2 : 40 %  SpO2: 98 %  SpO2/FiO2 ratio: 245  Sensitivity: 2  PEEP/CPAP: (S) 5  I Time/ I Time %: 0.8 s  Humidification Source: Heated wire  Humidification Temp: 37  Humidification Temp Measured: 36.7  Circuit Condensation: Drained  Nitric Oxide/Epoprostenol In Use?: No  Mask Type: Full face mask  Mask Size: Small  Additional Respiratory  Assessments  Pulse: 82  Resp: 15  SpO2: 98 %  $End Tidal CO2: 30  pCO2 (TCOM, mmHg): 42 mmHg  Position: Semi-Mcclendon's  Humidification Source: Heated wire  Humidification Temp: 37  Circuit Condensation: Drained  Oral Care Completed?: Yes  Oral Care: Teeth brushed, Mouthwash, Mouth suctioned  Subglottic Suction Done?: Yes  Cuff Pressure (cm H2O): (mov)    Laboratory findings:    Complete Blood Count:   Recent Labs     10/29/20  0442 10/30/20  0543 10/31/20  0417   WBC 9.9 8.6 8.6   HGB 10.4* 10.4* 9.6*   HCT 33.5* 33.8* 31.2*    211 223        Last 3 Blood Glucose:   Recent Labs     10/29/20  0442 10/30/20  0543 10/31/20  0417   GLUCOSE 126* 148* 113*        PT/INR:    Lab Results   Component Value Date    PROTIME 12.7 10/22/2020    INR 1.0 10/22/2020     PTT:    Lab Results   Component Value Date    APTT 57.1 10/27/2020       Comprehensive Metabolic Profile:   Recent Labs     10/29/20  0442 10/30/20  0543 10/31/20  0417    142 147*   K 3.9 3.5* 3.5*    104 112*   CO2 38* 29 28   BUN 24* 23 25*   CREATININE 0.47* 0.52 0.46*   GLUCOSE 126* 148* 113*   CALCIUM 9.3 9.3 9.1   PROT 5.6*  --   --    LABALBU 2.6*  --   --    BILITOT 0.29*  --   --    ALKPHOS 68  --   --    AST 15  --   --    ALT 14  --   --       Magnesium:   Lab Results   Component Value Date    MG 2.5 10/31/2020     Phosphorus: No results found for: PHOS  Ionized Calcium:   Lab Results   Component Value Date    CAION 1.17 10/24/2020        Troponin: No results for input(s): TROPONINI in the last 72 hours. Microbiology:    Cultures during this admission:     Blood cultures:                 [] None drawn      [x] Negative             []  Positive (Details:  )  Urine Culture:                   [] None drawn      [x] Negative             []  Positive (Details:  )  Sputum Culture:               [] None drawn       [] Negative             [x]  Positive (Details: klebsiella on 10/27 )   Endotracheal aspirate:     [x] None drawn       [] Negative             []  Positive (Details:  )     Radiology/Imaging:     Chest Xray (10/31/2020):   CT HEAD WO CONTRAST   Final Result   No acute intracranial abnormality. MRI may be obtained if clinically   indicated.          XR CHEST PORTABLE   Final Result   1. Endotracheal and enteric tubes remain in place. Interval right IJ   central venous catheter with distal tip at the mid SVC. 2.  Diffuse hazy airspace and interstitial opacities in the lungs are   slightly increased when compared to the radiograph performed earlier this   morning. XR CHEST PORTABLE   Final Result   1. Endotracheal tube remains in appropriate position. 2. Bilateral airspace disease again noted, although less prominent. XR CHEST PORTABLE   Final Result   Multifocal interstitial and consolidative infiltrates seen throughout the   lungs bilaterally, with worsening consolidation in the right lung base. XR CHEST PORTABLE   Final Result   1. Endotracheal tube terminates at the level of the alejandro and should be   pulled back approximately 2-3 cm.   2. Redemonstration of coarse, diffuse bilateral interstitial opacities with   slightly increased superimposed hazy airspace opacity, possibly edema or   worsening pneumonia. XR CHEST PORTABLE   Final Result   Coarse bilateral airspace opacities within the upper and lower lobes, not   significantly changed, suggesting a multi lobar pneumonia. XR CHEST PORTABLE   Final Result   Partial clearing of bilateral lung opacities. XR CHEST PORTABLE   Final Result   Increased bilateral lung opacities. XR CHEST PORTABLE   Final Result   Bilateral lung opacities likely pneumonia. Enteric tube with the tip within the proximal stomach. Consider slightly   advancing. XR ABDOMEN FOR NG/OG/NE TUBE PLACEMENT   Final Result   Life-support devices as above. Similar-appearing diffuse bilateral heterogeneous opacities and multifocal   consolidations. XR CHEST PORTABLE   Final Result   Life-support devices as above. Similar-appearing diffuse bilateral heterogeneous opacities and multifocal   consolidations.          XR CHEST PORTABLE   Final Result   No significant interval change in multifocal bilateral consolidation, most   concerning for pneumonia. XR CHEST PORTABLE   Final Result   No significant change in the multifocal airspace opacities worrisome for   multifocal pneumonia. ASSESSMENT:     Patient Active Problem List    Diagnosis Date Noted    Acute metabolic encephalopathy 61/07/2952     Priority: High     Class: Acute    Acute respiratory failure with hypoxia and hypercapnia (HCC)     Elevated troponin     Severe sepsis ([facility] [address] 10/22/2020    COPD exacerbation ([facility] [address] 10/22/2020    Sepsis due to pneumonia ([facility] [address] 08/16/2020    Lactic acidosis 08/16/2020    Septic shock ([facility] [address] 08/16/2020    Status post surgery 12/20/2018    Pneumonia of both lower lobes due to infectious organism 10/05/2018    Hypothyroidism 10/05/2018    Major depressive disorder 10/05/2018    Chronic midline low back pain without sciatica 10/05/2018    Iron deficiency anemia 10/05/2018       PLAN:     WEAN PER PROTOCOL:  [] No   [x] Yes  [] N/A    DISCONTINUE ANY LABS:   [x] No   [] Yes    ICU PROPHYLAXIS:  Stress ulcer:  [] PPI Agent  [x] [id number] [] Sucralfate  [] Other:  VTE:   [x] Enoxaparin  [] Unfract. Heparin Subcut  [] EPC Cuffs    NUTRITION:  [] NPO [] Tube Feeding (Specify: ) [] TPN  [x] PO (Diet: DIET TUBE FEED CONTINUOUS/CYCLIC NPO; STANDARD WITHOUT FIBER; Nasogastric; Continuous; 10; 65)    HOME MEDICATIONS RECONCILED: [] No  [x] Yes    INSULIN DRIP:   [x] No   [] Yes    CONSULTATION NEEDED:  [x] No   [] Yes    FAMILY UPDATED:    [x] No   [] Yes    TRANSFER OUT OF ICU:   [x] No   [] Yes    ADDITIONAL PLAN:    Neuro  -Fentanyl, propofol, off versed gtt this AM. Will add oral roxicodone q8 hours  - ? Posturing? Neuro critical care has been consulted. CT head was negative.     CV  - A fib: Therapeutic Lovenox. Transition to oral amiodarone.  -Cardiomyopathy: EF 30%. Will need lifepak on discharge. Holding lasix for now given hypotension. Cardiology recommends lopressor. -hypotension - midodrine, solucortef. -Possible LV thrombus: 70 mg lovenox BID     Pulm  - Intubated, PRVC 22 / 480 / 8 / 40%  -AB.41  /   -P:F 230  -CXR this AM.     GI  - TF at goal of 65  - BG 120s-140s, on solucortef. -Pepcid GI ppx  -+ BM 10/31     /Renal  -UOP 1.9 cc/kg/hr. +7.8 L since admit  -Hypokalemia at 3.5. replace per sliding scale. -Creatinine, electrolytes o/w WNL  - 20 mg Lasix today. Gentle diuresis     ID/Heme  -Pneumonia? ID consulted. Finish azithromycin, on rocephin 2 g.  -WBC stable between 6-9.   -Hb stable around 10  -Tmax 37.1     Other  -h/o hypothyroidism. Cont home synthroid 150 mch QD    Laurosyeda Leong DO  [address]  10/31/2020, 8:59 AM    Attending Physician Statement  I have discussed the care of Chriss Escobar, including pertinent history and exam findings with the resident. I have reviewed the key elements of all parts of the encounter with the resident. I have seen and examined the patient with the resident. I agree with the assessment and plan and status of the problem list as documented. I seen the patient during my round today, I have reviewed the chart, lab seen medications reviewed and arterial blood gases seen. She remained encephalopathic she does have eyes blinking she move extremities spontaneously but does not follow command, there was some concern overnight about posturing so neuro critical care was consulted and she was started on LTME there is no seizure-like activity and when I saw her she was moving spontaneously does not follow commands, she had a CT scan of the head done which was negative. Arterial blood gas 7.4  and ventilator setting reviewed. Her sodium again increased to 147 and she had received Lasix this morning.     She is on propofol drip and she is also on fentanyl 200 mcg this morning, she was started on oxycodone and she is on Dilaudid pump and discussed with the nursing staff yesterday and today again to wean down preferably wean off fentanyl and then will work on propofol. Will increase free water to every 4 hours. Hold Lasix and follow bicarbonate and sodium tomorrow. Continue with tube feeding. Continue to monitor neurological status and follow-up with the DME and neurology. Continue with ventilator support. Discussed with  and updated and questions answered  Discussed with nursing staff, treatment and plan discussed. Discussed with respiratory therapist.    Total critical care time caring for this patient with life threatening, unstable organ failure, including direct patient contact, management of life support systems, review of data including imaging and labs, discussions with other team members and physicians at least 27  Min so far today, excluding procedures. Please note that this chart was generated using voice recognition Dragon dictation software. Although every effort was made to ensure the accuracy of this automated transcription, some errors in transcription may have occurred.      Janes Oliva MD  10/31/2020 11:20 AM

## 2020-10-31 NOTE — PROGRESS NOTES
10/31/20 0800   Vent Information   Vent Mode CPAP   Pressure Support 8 cmH20   PEEP/CPAP 5     0800 wean trial started. 1143 placed back on full support due to restlessness.

## 2020-11-01 LAB
ABSOLUTE EOS #: 0.05 K/UL (ref 0–0.44)
ABSOLUTE IMMATURE GRANULOCYTE: 0.07 K/UL (ref 0–0.3)
ABSOLUTE LYMPH #: 0.9 K/UL (ref 1.1–3.7)
ABSOLUTE MONO #: 0.42 K/UL (ref 0.1–1.2)
ALLEN TEST: ABNORMAL
ANION GAP SERPL CALCULATED.3IONS-SCNC: 10 MMOL/L (ref 9–17)
BASOPHILS # BLD: 0 % (ref 0–2)
BASOPHILS ABSOLUTE: <0.03 K/UL (ref 0–0.2)
BUN BLDV-MCNC: 25 MG/DL (ref 8–23)
BUN/CREAT BLD: ABNORMAL (ref 9–20)
CALCIUM SERPL-MCNC: 8.8 MG/DL (ref 8.6–10.4)
CHLORIDE BLD-SCNC: 105 MMOL/L (ref 98–107)
CO2: 27 MMOL/L (ref 20–31)
CREAT SERPL-MCNC: 0.48 MG/DL (ref 0.5–0.9)
DIFFERENTIAL TYPE: ABNORMAL
EOSINOPHILS RELATIVE PERCENT: 1 % (ref 1–4)
FIO2: 40
GFR AFRICAN AMERICAN: >60 ML/MIN
GFR NON-AFRICAN AMERICAN: >60 ML/MIN
GFR SERPL CREATININE-BSD FRML MDRD: ABNORMAL ML/MIN/{1.73_M2}
GFR SERPL CREATININE-BSD FRML MDRD: ABNORMAL ML/MIN/{1.73_M2}
GLUCOSE BLD-MCNC: 140 MG/DL (ref 70–99)
GLUCOSE BLD-MCNC: 146 MG/DL (ref 74–100)
HCT VFR BLD CALC: 33 % (ref 36.3–47.1)
HEMOGLOBIN: 10.2 G/DL (ref 11.9–15.1)
IMMATURE GRANULOCYTES: 1 %
LYMPHOCYTES # BLD: 10 % (ref 24–43)
MAGNESIUM: 2.3 MG/DL (ref 1.6–2.6)
MCH RBC QN AUTO: 30.2 PG (ref 25.2–33.5)
MCHC RBC AUTO-ENTMCNC: 30.9 G/DL (ref 28.4–34.8)
MCV RBC AUTO: 97.6 FL (ref 82.6–102.9)
MODE: ABNORMAL
MONOCYTES # BLD: 5 % (ref 3–12)
NEGATIVE BASE EXCESS, ART: ABNORMAL (ref 0–2)
NRBC AUTOMATED: 0 PER 100 WBC
O2 DEVICE/FLOW/%: ABNORMAL
PATIENT TEMP: ABNORMAL
PDW BLD-RTO: 14.2 % (ref 11.8–14.4)
PHOSPHORUS: 2.5 MG/DL (ref 2.6–4.5)
PLATELET # BLD: 259 K/UL (ref 138–453)
PLATELET ESTIMATE: ABNORMAL
PMV BLD AUTO: 10.8 FL (ref 8.1–13.5)
POC HCO3: 31.5 MMOL/L (ref 21–28)
POC LACTIC ACID: 0.8 MMOL/L (ref 0.56–1.39)
POC O2 SATURATION: 96 % (ref 94–98)
POC PCO2 TEMP: ABNORMAL MM HG
POC PCO2: 45.3 MM HG (ref 35–48)
POC PH TEMP: ABNORMAL
POC PH: 7.45 (ref 7.35–7.45)
POC PO2 TEMP: ABNORMAL MM HG
POC PO2: 75.6 MM HG (ref 83–108)
POSITIVE BASE EXCESS, ART: 7 (ref 0–3)
POTASSIUM SERPL-SCNC: 3.8 MMOL/L (ref 3.7–5.3)
RBC # BLD: 3.38 M/UL (ref 3.95–5.11)
RBC # BLD: ABNORMAL 10*6/UL
SAMPLE SITE: ABNORMAL
SEG NEUTROPHILS: 83 % (ref 36–65)
SEGMENTED NEUTROPHILS ABSOLUTE COUNT: 7.46 K/UL (ref 1.5–8.1)
SODIUM BLD-SCNC: 142 MMOL/L (ref 135–144)
TCO2 (CALC), ART: 33 MMOL/L (ref 22–29)
WBC # BLD: 8.9 K/UL (ref 3.5–11.3)
WBC # BLD: ABNORMAL 10*3/UL

## 2020-11-01 PROCEDURE — 94761 N-INVAS EAR/PLS OXIMETRY MLT: CPT

## 2020-11-01 PROCEDURE — 2500000003 HC RX 250 WO HCPCS: Performed by: STUDENT IN AN ORGANIZED HEALTH CARE EDUCATION/TRAINING PROGRAM

## 2020-11-01 PROCEDURE — 99291 CRITICAL CARE FIRST HOUR: CPT | Performed by: INTERNAL MEDICINE

## 2020-11-01 PROCEDURE — 82947 ASSAY GLUCOSE BLOOD QUANT: CPT

## 2020-11-01 PROCEDURE — 6360000002 HC RX W HCPCS: Performed by: STUDENT IN AN ORGANIZED HEALTH CARE EDUCATION/TRAINING PROGRAM

## 2020-11-01 PROCEDURE — 80048 BASIC METABOLIC PNL TOTAL CA: CPT

## 2020-11-01 PROCEDURE — 6370000000 HC RX 637 (ALT 250 FOR IP): Performed by: STUDENT IN AN ORGANIZED HEALTH CARE EDUCATION/TRAINING PROGRAM

## 2020-11-01 PROCEDURE — 94770 HC ETCO2 MONITOR DAILY: CPT

## 2020-11-01 PROCEDURE — 95720 EEG PHY/QHP EA INCR W/VEEG: CPT | Performed by: PSYCHIATRY & NEUROLOGY

## 2020-11-01 PROCEDURE — 2580000003 HC RX 258: Performed by: STUDENT IN AN ORGANIZED HEALTH CARE EDUCATION/TRAINING PROGRAM

## 2020-11-01 PROCEDURE — 82803 BLOOD GASES ANY COMBINATION: CPT

## 2020-11-01 PROCEDURE — 95718 EEG PHYS/QHP 2-12 HR W/VEEG: CPT | Performed by: PSYCHIATRY & NEUROLOGY

## 2020-11-01 PROCEDURE — 83735 ASSAY OF MAGNESIUM: CPT

## 2020-11-01 PROCEDURE — 93005 ELECTROCARDIOGRAM TRACING: CPT | Performed by: STUDENT IN AN ORGANIZED HEALTH CARE EDUCATION/TRAINING PROGRAM

## 2020-11-01 PROCEDURE — 85025 COMPLETE CBC W/AUTO DIFF WBC: CPT

## 2020-11-01 PROCEDURE — 94640 AIRWAY INHALATION TREATMENT: CPT

## 2020-11-01 PROCEDURE — 94003 VENT MGMT INPAT SUBQ DAY: CPT

## 2020-11-01 PROCEDURE — 2000000000 HC ICU R&B

## 2020-11-01 PROCEDURE — 99233 SBSQ HOSP IP/OBS HIGH 50: CPT | Performed by: INTERNAL MEDICINE

## 2020-11-01 PROCEDURE — 83605 ASSAY OF LACTIC ACID: CPT

## 2020-11-01 PROCEDURE — 6370000000 HC RX 637 (ALT 250 FOR IP): Performed by: INTERNAL MEDICINE

## 2020-11-01 PROCEDURE — 84100 ASSAY OF PHOSPHORUS: CPT

## 2020-11-01 PROCEDURE — 2700000000 HC OXYGEN THERAPY PER DAY

## 2020-11-01 PROCEDURE — 95708 EEG WO VID EA 12-26HR UNMNTR: CPT

## 2020-11-01 PROCEDURE — 37799 UNLISTED PX VASCULAR SURGERY: CPT

## 2020-11-01 RX ORDER — SENNA AND DOCUSATE SODIUM 50; 8.6 MG/1; MG/1
2 TABLET, FILM COATED ORAL DAILY
Status: DISCONTINUED | OUTPATIENT
Start: 2020-11-01 | End: 2020-11-06 | Stop reason: HOSPADM

## 2020-11-01 RX ORDER — LISINOPRIL 2.5 MG/1
2.5 TABLET ORAL DAILY
Status: DISCONTINUED | OUTPATIENT
Start: 2020-11-01 | End: 2020-11-01

## 2020-11-01 RX ADMIN — FAMOTIDINE 20 MG: 10 INJECTION INTRAVENOUS at 09:10

## 2020-11-01 RX ADMIN — IPRATROPIUM BROMIDE AND ALBUTEROL SULFATE 1 AMPULE: .5; 3 SOLUTION RESPIRATORY (INHALATION) at 07:52

## 2020-11-01 RX ADMIN — IPRATROPIUM BROMIDE AND ALBUTEROL SULFATE 1 AMPULE: .5; 3 SOLUTION RESPIRATORY (INHALATION) at 14:13

## 2020-11-01 RX ADMIN — POLYETHYLENE GLYCOL 3350 17 G: 17 POWDER, FOR SOLUTION ORAL at 08:38

## 2020-11-01 RX ADMIN — ENOXAPARIN SODIUM 70 MG: 80 INJECTION SUBCUTANEOUS at 09:08

## 2020-11-01 RX ADMIN — STANDARDIZED SENNA CONCENTRATE AND DOCUSATE SODIUM 2 TABLET: 8.6; 5 TABLET ORAL at 18:09

## 2020-11-01 RX ADMIN — LEVOTHYROXINE SODIUM 150 MCG: 75 TABLET ORAL at 08:30

## 2020-11-01 RX ADMIN — DOCUSATE SODIUM 100 MG: 50 LIQUID ORAL at 08:37

## 2020-11-01 RX ADMIN — PREGABALIN 300 MG: 100 CAPSULE ORAL at 08:30

## 2020-11-01 RX ADMIN — ASPIRIN 81 MG: 81 TABLET, CHEWABLE ORAL at 08:30

## 2020-11-01 RX ADMIN — SODIUM CHLORIDE, PRESERVATIVE FREE 10 ML: 5 INJECTION INTRAVENOUS at 20:15

## 2020-11-01 RX ADMIN — AMIODARONE HYDROCHLORIDE 200 MG: 200 TABLET ORAL at 08:30

## 2020-11-01 RX ADMIN — AMIODARONE HYDROCHLORIDE 200 MG: 200 TABLET ORAL at 19:35

## 2020-11-01 RX ADMIN — POTASSIUM PHOSPHATE, MONOBASIC AND POTASSIUM PHOSPHATE, DIBASIC 20 MMOL: 224; 236 INJECTION, SOLUTION, CONCENTRATE INTRAVENOUS at 14:48

## 2020-11-01 RX ADMIN — OXYCODONE HYDROCHLORIDE 10 MG: 5 TABLET ORAL at 12:41

## 2020-11-01 RX ADMIN — METOPROLOL TARTRATE 12.5 MG: 25 TABLET ORAL at 08:30

## 2020-11-01 RX ADMIN — SODIUM CHLORIDE, PRESERVATIVE FREE 10 ML: 5 INJECTION INTRAVENOUS at 09:47

## 2020-11-01 RX ADMIN — PREGABALIN 300 MG: 100 CAPSULE ORAL at 19:35

## 2020-11-01 RX ADMIN — SPIRONOLACTONE 25 MG: 25 TABLET ORAL at 08:30

## 2020-11-01 RX ADMIN — CEFTRIAXONE SODIUM 2 G: 2 INJECTION, POWDER, FOR SOLUTION INTRAMUSCULAR; INTRAVENOUS at 11:38

## 2020-11-01 RX ADMIN — IPRATROPIUM BROMIDE AND ALBUTEROL SULFATE 1 AMPULE: .5; 3 SOLUTION RESPIRATORY (INHALATION) at 19:46

## 2020-11-01 RX ADMIN — BISACODYL 10 MG: 10 SUPPOSITORY RECTAL at 07:30

## 2020-11-01 RX ADMIN — ENOXAPARIN SODIUM 70 MG: 80 INJECTION SUBCUTANEOUS at 19:36

## 2020-11-01 RX ADMIN — OXYCODONE HYDROCHLORIDE 10 MG: 5 TABLET ORAL at 04:17

## 2020-11-01 RX ADMIN — OXYCODONE HYDROCHLORIDE 10 MG: 5 TABLET ORAL at 19:35

## 2020-11-01 RX ADMIN — SODIUM CHLORIDE, PRESERVATIVE FREE 10 ML: 5 INJECTION INTRAVENOUS at 09:46

## 2020-11-01 RX ADMIN — PROPOFOL 45 MCG/KG/MIN: 10 INJECTION, EMULSION INTRAVENOUS at 04:17

## 2020-11-01 RX ADMIN — METOPROLOL TARTRATE 25 MG: 25 TABLET ORAL at 19:35

## 2020-11-01 RX ADMIN — FAMOTIDINE 20 MG: 10 INJECTION INTRAVENOUS at 19:36

## 2020-11-01 ASSESSMENT — PULMONARY FUNCTION TESTS
PIF_VALUE: 16
PIF_VALUE: 12
PIF_VALUE: 10
PIF_VALUE: 17
PIF_VALUE: 10
PIF_VALUE: 7
PIF_VALUE: 14
PIF_VALUE: 14
PIF_VALUE: 10

## 2020-11-01 ASSESSMENT — PAIN SCALES - GENERAL
PAINLEVEL_OUTOF10: 0
PAINLEVEL_OUTOF10: 0

## 2020-11-01 NOTE — PLAN OF CARE
BRONCHOSPASM/BRONCHOCONSTRICTION     [x]         IMPROVE AERATION/BREATH SOUNDS  [x]   ADMINISTER BRONCHODILATOR THERAPY AS APPROPRIATE  [x]   ASSESS BREATH SOUNDS  [x]   IMPLEMENT AEROSOL/MDI PROTOCOL  [x]   PATIENT EDUCATION AS NEEDED     Problem: OXYGENATION/RESPIRATORY FUNCTION  Goal: Patient will maintain patent airway  Outcome: Ongoing  Goal: Patient will achieve/maintain normal respiratory rate/effort  Respiratory rate and effort will be within normal limits for the patient  Outcome: Ongoing    Problem: MECHANICAL VENTILATION  Goal: Patient will maintain patent airway  Outcome: Ongoing  Goal: Oral health is maintained or improved  Outcome: Ongoing  Goal: ET tube will be managed safely  Outcome: Ongoing  Goal: Ability to express needs and understand communication  Outcome: Ongoing  Goal: Mobility/activity is maintained at optimum level for patient  Outcome: Ongoing    Problem: ASPIRATION PRECAUTIONS  Goal: Patients risk of aspiration is minimized  Outcome: Ongoing    Problem: SKIN INTEGRITY  Goal: Skin integrity is maintained or improved  Outcome: Ongoing                   Ventilator Bronchodilator assessment    Breath sounds: intermittent rhonchi, rales  Inspiratory Pressure: 21  Plateau Pressure: 21    Patient assessed at level 2          []    Bronchodilator Assessment    BRONCHODILATOR ASSESSMENT SCORE  Score 0 (Home) 1 2 3 4   Breath Sounds   []  Chronic Ventilator: Patient at baseline []  Mild Wheezes/ Clear [x]  Intermittent wheezes with good air entry []  Bilateral/unilateral wheezing with diminished air entry []  Insp/Exp wheeze and/or poor aeration   Ventilator Pressures   []  Chronic Ventilator [x]  Insp. Pressure less than 25 cm H20 [x]  Insp. Pressure less than 25 cm H20 []  Insp. Pressure exceeds 25 cm H20 []  Insp.  Pressure exceeds 30 cm H20   Plateau Pressure []  NA   [x]  Plateau Pressure less than 4  []  Plateau Pressure less than or equal to 5 []  Plateau Pressure greater than or equal to 6 []  Plateau Pressure greater than or equal to 8       JOHNNY BARRIOS  9:05 AM

## 2020-11-01 NOTE — PLAN OF CARE
Problem: Restraint Use - Nonviolent/Non-Self-Destructive Behavior:  Goal: Absence of restraint-related injury  Description: Absence of restraint-related injury  Outcome: Met This Shift     Problem: Skin Integrity:  Goal: Will show no infection signs and symptoms  Description: Will show no infection signs and symptoms  11/1/2020 0614 by Natalie Meza RN  Outcome: Ongoing  11/1/2020 0614 by Natalie Meza RN  Outcome: Ongoing  Goal: Absence of new skin breakdown  Description: Absence of new skin breakdown  11/1/2020 0614 by Natalie Meza RN  Outcome: Ongoing  11/1/2020 0614 by Natalie Meza RN  Outcome: Ongoing     Problem: Falls - Risk of:  Goal: Will remain free from falls  Description: Will remain free from falls  11/1/2020 0614 by Natalie Meza RN  Outcome: Ongoing  11/1/2020 0614 by Natalie Meza RN  Outcome: Ongoing  Goal: Absence of physical injury  Description: Absence of physical injury  11/1/2020 0614 by Natalie Meza RN  Outcome: Ongoing  11/1/2020 0614 by Natalie Meza RN  Outcome: Ongoing     Problem: Pain:  Goal: Pain level will decrease  Description: Pain level will decrease  11/1/2020 0614 by Natalie Meza RN  Outcome: Ongoing  11/1/2020 0614 by Natalie Meza RN  Outcome: Ongoing  Goal: Control of acute pain  Description: Control of acute pain  11/1/2020 0614 by Natalie Meza RN  Outcome: Ongoing  11/1/2020 0614 by Natalie Meza RN  Outcome: Ongoing  Goal: Control of chronic pain  Description: Control of chronic pain  11/1/2020 0614 by Natalie Meza RN  Outcome: Ongoing  11/1/2020 0614 by Natalie Meza RN  Outcome: Ongoing     Problem: OXYGENATION/RESPIRATORY FUNCTION  Goal: Patient will maintain patent airway  11/1/2020 0614 by Natalie Meza RN  Outcome: Ongoing  11/1/2020 0614 by Natalie Meza RN  Outcome: Ongoing  10/31/2020 1954 by Dru Ley RCP  Outcome: Ongoing  Goal: Patient will achieve/maintain normal respiratory rate/effort  Description: Respiratory rate and effort will be within normal limits for the patient  11/1/2020 9085 by Les Stephenson RN  Outcome: Ongoing  11/1/2020 0614 by Les Stephenson RN  Outcome: Ongoing  10/31/2020 1954 by Rafe Aase, RCP  Outcome: Ongoing     Problem: MECHANICAL VENTILATION  Goal: Patient will maintain patent airway  11/1/2020 0614 by Les Stephenson RN  Outcome: Ongoing  11/1/2020 0614 by Les Stephenson RN  Outcome: Ongoing  10/31/2020 1954 by Rafe Aase, RCP  Outcome: Ongoing  Goal: Oral health is maintained or improved  11/1/2020 0614 by Les Stephenson RN  Outcome: Ongoing  11/1/2020 0614 by Les Stephenson RN  Outcome: Ongoing  10/31/2020 1954 by Rafe Aase, RCP  Outcome: Ongoing  Goal: ET tube will be managed safely  11/1/2020 0614 by Les Stephenson RN  Outcome: Ongoing  11/1/2020 0614 by Les Stephenson RN  Outcome: Ongoing  10/31/2020 1954 by Rafe Aase, RCP  Outcome: Ongoing  Goal: Ability to express needs and understand communication  11/1/2020 0614 by Les Stephenson RN  Outcome: Ongoing  11/1/2020 0614 by Les Stephenson RN  Outcome: Ongoing  10/31/2020 1954 by Rafe Aase, RCP  Outcome: Ongoing  Goal: Mobility/activity is maintained at optimum level for patient  11/1/2020 0614 by Les Stephenson RN  Outcome: Ongoing  11/1/2020 0614 by Les Stephenson RN  Outcome: Ongoing  10/31/2020 1954 by Rafe Aase, RCP  Outcome: Ongoing     Problem: SKIN INTEGRITY  Goal: Skin integrity is maintained or improved  11/1/2020 0614 by Les Stephenson RN  Outcome: Ongoing  11/1/2020 0614 by Les Stephenson RN  Outcome: Ongoing  10/31/2020 1954 by Rafe Aase, RCP  Outcome: Ongoing     Problem: Nutrition  Goal: Optimal nutrition therapy  Description: Nutrition Problem #1: Inadequate oral intake  Intervention: Food and/or Nutrient Delivery: Start nutrition as able; if TF, suggest Standard without Fiber goal 65 mL/hr to provide 1872 kcal and 87 g pro/day.   Nutritional Goals: meet % of estimated nutrient needs     11/1/2020 5005 by Inés Harrison Verbalizations of feeling emotionally comfortable while being cared for will increase  11/1/2020 0614 by Natalie Meza RN  Outcome: Ongoing  11/1/2020 0614 by Natalie Meza RN  Outcome: Ongoing     Problem: Psychomotor Activity - Altered:  Goal: Absence of psychomotor disturbance signs and symptoms  Description: Absence of psychomotor disturbance signs and symptoms  11/1/2020 0614 by Natalie Meza RN  Outcome: Ongoing  11/1/2020 0614 by Natalie Meza RN  Outcome: Ongoing     Problem: Sensory Perception - Impaired:  Goal: Demonstrations of improved sensory functioning will increase  Description: Demonstrations of improved sensory functioning will increase  11/1/2020 0614 by Natalie Meza RN  Outcome: Ongoing  11/1/2020 0614 by Natalie Meza RN  Outcome: Ongoing  Goal: Decrease in sensory misperception frequency  Description: Decrease in sensory misperception frequency  11/1/2020 0614 by Natalie Meza RN  Outcome: Ongoing  11/1/2020 0614 by Natalie Meza RN  Outcome: Ongoing  Goal: Able to refrain from responding to false sensory perceptions  Description: Able to refrain from responding to false sensory perceptions  11/1/2020 0614 by Natalie Meza RN  Outcome: Ongoing  11/1/2020 0614 by Natalie Meza RN  Outcome: Ongoing  Goal: Demonstrates accurate environmental perceptions  Description: Demonstrates accurate environmental perceptions  11/1/2020 3467 by Natalie Meza RN  Outcome: Ongoing  11/1/2020 0614 by Natalie Meza RN  Outcome: Ongoing  Goal: Able to distinguish between reality-based and nonreality-based thinking  Description: Able to distinguish between reality-based and nonreality-based thinking  11/1/2020 0614 by Natalie Meza RN  Outcome: Ongoing  11/1/2020 0614 by Natalie Meza RN  Outcome: Ongoing  Goal: Able to interrupt nonreality-based thinking  Description: Able to interrupt nonreality-based thinking  11/1/2020 0614 by Natalie Meza RN  Outcome: Ongoing  11/1/2020 0614 by Natalie Meza RN  Outcome:

## 2020-11-01 NOTE — FLOWSHEET NOTE
Assessment:  Patient is a 77 y.o. female in room 126. Patient unable to respond.  at bedside.  offered prayer for patient. Intervention:   was ministry of presence.  offered prayer. Outcome;  Patient unable to respond. Plan:  Chaplains will remain available for spiritual and emotional security as needed.      11/01/20 1520   Encounter Summary   Services provided to: Patient   Referral/Consult From: [facility] Family members   Continue Visiting   (11/1/2020)   Complexity of Encounter Low   Length of Encounter 15 minutes   Spiritual Assessment Completed Yes   Routine   Type Initial   Assessment Unable to respond   Intervention Prayer   Outcome Did not respond

## 2020-11-01 NOTE — PROGRESS NOTES
11/01/20 0805   Vent Information   Vent Mode CPAP   Pressure Support 8 cmH20   PEEP/CPAP 5     0805 wean trial started.

## 2020-11-01 NOTE — PROGRESS NOTES
Neuro Critical Care Progress Note          INTERVAL HISTORY      The patient is a 77 y.o. female with history of hypothyroidism, COPD, iron deficiency anemia who was admitted with pneumonia and altered mental status. CT scan initially showed bilateral infiltrates. On presentation she was lethargic, somnolent but reportedly was able to follow commands and able to answer questions like saying the year is 2020, president is Tir[place]. Patient was intubated and sedated. This morning she was weaned off propofol and Versed and was noticed to have decorticate posturing. Stat CT head was done that showed no pertinent findings. She is currently on propofol 45 mics per hour and fentanyl 200 mics per hour. Over the last several hours, LTM E showed no epileptiform discharge or electrographic seizures. Propofol was discontinued.   Patient is more alert today     Current Medications:   Current Facility-Administered Medications: amiodarone (CORDARONE) tablet 200 mg, 200 mg, Oral, BID  [Held by provider] furosemide (LASIX) injection 20 mg, 20 mg, Intravenous, Daily  oxyCODONE (ROXICODONE) immediate release tablet 10 mg, 10 mg, Oral, Q8H  fleet rectal enema 1 enema, 1 enema, Rectal, Once  bisacodyl (DULCOLAX) suppository 10 mg, 10 mg, Rectal, Daily PRN  propofol injection, 10 mcg/kg/min, Intravenous, Titrated  midodrine (PROAMATINE) tablet 10 mg, 10 mg, Oral, TID WC  spironolactone (ALDACTONE) tablet 25 mg, 25 mg, Oral, Daily  metoprolol tartrate (LOPRESSOR) tablet 12.5 mg, 12.5 mg, Oral, BID  docusate (COLACE) 50 MG/5ML liquid 100 mg, 100 mg, Oral, Daily  polyethylene glycol (GLYCOLAX) packet 17 g, 17 g, Oral, Daily  cefTRIAXone (ROCEPHIN) 2 g IVPB in D5W 50ml minibag, 2 g, Intravenous, Q24H  enoxaparin (LOVENOX) injection 70 mg, 1 mg/kg, Subcutaneous, BID  sodium chloride flush 0.9 % injection 10 mL, 10 mL, Intravenous, 2 times per day  sodium chloride flush 0.9 % injection 10 mL, 10 mL, Intravenous, PRN  acetaminophen (TYLENOL) tablet 650 mg, 650 mg, Oral, Q4H PRN  aspirin chewable tablet 81 mg, 81 mg, Oral, Daily  potassium chloride 20 mEq/50 mL IVPB (Central Line), 20 mEq, Intravenous, PRN  albuterol (PROVENTIL) nebulizer solution 2.5 mg, 2.5 mg, Nebulization, As Directed RT PRN  ipratropium-albuterol (DUONEB) nebulizer solution 1 ampule, 1 ampule, Inhalation, Q6H  norepinephrine (LEVOPHED) 16 mg in sodium chloride 0.9 % 250 mL infusion, 2 mcg/min, Intravenous, Continuous  fentaNYL 20 mcg/mL Infusion, 100 mcg/hr, Intravenous, Continuous  famotidine (PEPCID) injection 20 mg, 20 mg, Intravenous, BID  bupivacaine (PF) (MARCAINE) 5.5 mg, HYDROmorphone (DILAUDID) 2.797 mg, , Intrathecal, Once  levothyroxine (SYNTHROID) tablet 150 mcg, 150 mcg, Oral, Daily  pregabalin (LYRICA) capsule 300 mg, 300 mg, Oral, BID  sodium chloride flush 0.9 % injection 10 mL, 10 mL, Intravenous, 2 times per day  sodium chloride flush 0.9 % injection 10 mL, 10 mL, Intravenous, PRN  ondansetron (ZOFRAN) injection 4 mg, 4 mg, Intravenous, Q8H PRN    REVIEW OF SYSTEMS:       Intubated/sedated    PHYSICAL EXAM:       BP (!) 152/73   Pulse 92   Temp 99 °F (37.2 °C) (Oral)   Resp 26   Ht 5' 4\" (1.626 m)   Wt 157 lb 13.6 oz (71.6 kg)   SpO2 94%   BMI 27.09 kg/m²     General Examination  Level of consciousness: 11T  pattern of breathing: Breathing over the vent    Brain Stem Assessment  Normal pupillary response  Normal corneal Reflex  Eye movement:  -Spontaneous: present   -Oculocephalic reflex: present   -Cough reflex: present  -Gag reflex: present    motor responses  Movements. Following commands.    No posturing  Normal tone   Negative corea  Absent clonus   Present and symmetrical deep tendon reflexes (+1)  planatar relexes are down going bilaterally   No involuntary movements such as subtle signs of seizures and myoclonus  No evidence of asymmetry             LABS AND IMAGING:     CBC with Differential: Lab Results   Component Value Date    WBC 8.9 11/01/2020    RBC 3.38 11/01/2020    HGB 10.2 11/01/2020    HCT 33.0 11/01/2020     11/01/2020    MCV 97.6 11/01/2020    MCH 30.2 11/01/2020    MCHC 30.9 11/01/2020    RDW 14.2 11/01/2020    METASPCT 1 08/20/2020    LYMPHOPCT 10 11/01/2020    MONOPCT 5 11/01/2020    BASOPCT 0 11/01/2020    MONOSABS 0.42 11/01/2020    LYMPHSABS 0.90 11/01/2020    EOSABS 0.05 11/01/2020    BASOSABS <0.03 11/01/2020    DIFFTYPE NOT REPORTED 11/01/2020     BMP:    Lab Results   Component Value Date     11/01/2020    K 3.8 11/01/2020     11/01/2020    CO2 27 11/01/2020    BUN 25 11/01/2020    LABALBU 2.6 10/29/2020    CREATININE 0.48 11/01/2020    CALCIUM 8.8 11/01/2020    GFRAA >60 11/01/2020    LABGLOM >60 11/01/2020    GLUCOSE 140 11/01/2020       Radiology Review:      CTH w/o (10/31/2020): No acute intracranial abnormality. MRI may be obtained if clinically indicated. CXR: 1.  Endotracheal and enteric tubes remain in place. Interval right IJ central venous catheter with distal tip at the mid SVC. 2. Diffuse hazy airspace and interstitial opacities in the lungs are slightly increased when compared to the radiograph performed earlier this morning. LTM EEG:   Interictal: Continuous slow, generalized in delta and theta   frequency of 1-6Hz, there was intermittent rhythmic slow,   generalized in delta frequency, lasted 1-3 seconds. ASSESSMENT AND PLAN:       Toxic metabolic encephalopathy due to pneumosepsis    No evidence of subclinical seizures  Discontinue LTM EEG  Continue medical management per MICU      We will sign off    For any changes in exam or patient status please contact Neuro Critical Care.         Electronically signed by Tiana Vasquez MD PGY-3, Neurology on 11/1/2020 at 7:21 AM

## 2020-11-01 NOTE — PLAN OF CARE
Problem: Skin Integrity:  Goal: Will show no infection signs and symptoms  Description: Will show no infection signs and symptoms  11/1/2020 0947 by Urbano Pastor RN  Outcome: Ongoing  11/1/2020 0614 by Sherri Chen RN  Outcome: Ongoing  11/1/2020 0614 by Sherri Chen RN  Outcome: Ongoing  Goal: Absence of new skin breakdown  Description: Absence of new skin breakdown  11/1/2020 0947 by Urbano Pastor RN  Outcome: Ongoing  11/1/2020 0614 by Sherri Chen RN  Outcome: Ongoing  11/1/2020 0614 by Sherri Chen RN  Outcome: Ongoing     Problem: Falls - Risk of:  Goal: Will remain free from falls  Description: Will remain free from falls  11/1/2020 0947 by Urbano Pastor RN  Outcome: Ongoing  11/1/2020 0614 by Sherri Chen RN  Outcome: Ongoing  11/1/2020 0614 by Sherri Chen RN  Outcome: Ongoing  Goal: Absence of physical injury  Description: Absence of physical injury  11/1/2020 0947 by Urbano Pastor RN  Outcome: Ongoing  11/1/2020 0614 by Sherri Chen RN  Outcome: Ongoing  11/1/2020 0614 by Sherri Chen RN  Outcome: Ongoing     Problem: Pain:  Goal: Pain level will decrease  Description: Pain level will decrease  11/1/2020 0947 by Urbano Pastor RN  Outcome: Ongoing  11/1/2020 0614 by Sherri Chen RN  Outcome: Ongoing  11/1/2020 0614 by Sherri Chen RN  Outcome: Ongoing  Goal: Control of acute pain  Description: Control of acute pain  11/1/2020 0947 by Urbano Pastor RN  Outcome: Ongoing  11/1/2020 0614 by Sherri Chen RN  Outcome: Ongoing  11/1/2020 0614 by Sherri Chen RN  Outcome: Ongoing  Goal: Control of chronic pain  Description: Control of chronic pain  11/1/2020 0947 by Urbano Pastor RN  Outcome: Ongoing  11/1/2020 0614 by Sherri Chen RN  Outcome: Ongoing  11/1/2020 0614 by Sherri Chen RN  Outcome: Ongoing     Problem: OXYGENATION/RESPIRATORY FUNCTION  Goal: Patient will maintain patent airway  11/1/2020 0947 by Urbano Pastor RN  Outcome: Ongoing  11/1/2020 0614 by Rosemary Walton MARVIN Mejia  Outcome: Ongoing  11/1/2020 0614 by Luana Tillman RN  Outcome: Ongoing  Goal: Patient will achieve/maintain normal respiratory rate/effort  Description: Respiratory rate and effort will be within normal limits for the patient  11/1/2020 0947 by Fabiano Barry RN  Outcome: Ongoing  11/1/2020 0614 by Luana Tillman RN  Outcome: Ongoing  11/1/2020 0614 by Luana Tillman RN  Outcome: Ongoing     Problem: MECHANICAL VENTILATION  Goal: Patient will maintain patent airway  11/1/2020 0614 by Luana Tillman RN  Outcome: Ongoing  11/1/2020 0614 by Luana Tillman RN  Outcome: Ongoing  Goal: Oral health is maintained or improved  11/1/2020 0614 by Luana Tillman RN  Outcome: Ongoing  11/1/2020 0614 by Luana Tillman RN  Outcome: Ongoing  Goal: ET tube will be managed safely  11/1/2020 0614 by Luana Tillman RN  Outcome: Ongoing  11/1/2020 0614 by Luana Tillman RN  Outcome: Ongoing  Goal: Ability to express needs and understand communication  11/1/2020 0614 by Luana Tillman RN  Outcome: Ongoing  11/1/2020 0614 by Luana Tillman RN  Outcome: Ongoing  Goal: Mobility/activity is maintained at optimum level for patient  11/1/2020 4362 by Luana Tillman RN  Outcome: Ongoing  11/1/2020 0614 by Luana Tillman RN  Outcome: Ongoing     Problem: SKIN INTEGRITY  Goal: Skin integrity is maintained or improved  11/1/2020 0947 by Fabiano Barry RN  Outcome: Ongoing  11/1/2020 0614 by Luana Tillman RN  Outcome: Ongoing  11/1/2020 0614 by Luana Tillman RN  Outcome: Ongoing     Problem: Nutrition  Goal: Optimal nutrition therapy  Description: Nutrition Problem #1: Inadequate oral intake  Intervention: Food and/or Nutrient Delivery: Start nutrition as able; if TF, suggest Standard without Fiber goal 65 mL/hr to provide 1872 kcal and 87 g pro/day.   Nutritional Goals: meet % of estimated nutrient needs     11/1/2020 0947 by Fabiano Barry RN  Outcome: Ongoing  11/1/2020 0614 by Luana Tillman RN  Outcome: Ongoing  11/1/2020 0614 by George Francois RN  Outcome: Ongoing     Problem: Confusion - Acute:  Goal: Absence of continued neurological deterioration signs and symptoms  Description: Absence of continued neurological deterioration signs and symptoms  11/1/2020 0947 by Claudia Del Rio RN  Outcome: Ongoing  11/1/2020 0614 by George Francois RN  Outcome: Ongoing  11/1/2020 0614 by George Francois RN  Outcome: Ongoing  Goal: Mental status will be restored to baseline  Description: Mental status will be restored to baseline  11/1/2020 0947 by Claudia Del Rio RN  Outcome: Ongoing  11/1/2020 0614 by George Francois RN  Outcome: Ongoing  11/1/2020 0614 by George Francois RN  Outcome: Ongoing     Problem: Discharge Planning:  Goal: Ability to perform activities of daily living will improve  Description: Ability to perform activities of daily living will improve  11/1/2020 0614 by George Francois RN  Outcome: Ongoing  11/1/2020 0614 by George Francois RN  Outcome: Ongoing  Goal: Participates in care planning  Description: Participates in care planning  11/1/2020 0614 by George Francois RN  Outcome: Ongoing  11/1/2020 0614 by George Francois RN  Outcome: Ongoing     Problem: Injury - Risk of, Physical Injury:  Goal: Will remain free from falls  Description: Will remain free from falls  11/1/2020 0947 by Claudia Del Rio RN  Outcome: Ongoing  11/1/2020 0614 by George Francois RN  Outcome: Ongoing  11/1/2020 0614 by George Francois RN  Outcome: Ongoing  Goal: Absence of physical injury  Description: Absence of physical injury  11/1/2020 0947 by Claudia Del Rio RN  Outcome: Ongoing  11/1/2020 0614 by George Francois RN  Outcome: Ongoing  11/1/2020 0614 by George Francois RN  Outcome: Ongoing     Problem: Mood - Altered:  Goal: Mood stable  Description: Mood stable  11/1/2020 0947 by Claudia Del Rio RN  Outcome: Ongoing  11/1/2020 0614 by George Francois RN  Outcome: Ongoing  11/1/2020 0614 by George Francois RN  Outcome: Ongoing  Goal: Absence of abusive behavior  Description: reality-based and nonreality-based thinking  11/1/2020 0614 by Anni Galarza RN  Outcome: Ongoing  11/1/2020 0614 by Anni Galarza RN  Outcome: Ongoing  Goal: Able to interrupt nonreality-based thinking  Description: Able to interrupt nonreality-based thinking  11/1/2020 0614 by Anni Galarza RN  Outcome: Ongoing  11/1/2020 0614 by Anni Galarza RN  Outcome: Ongoing     Problem: Sleep Pattern Disturbance:  Goal: Appears well-rested  Description: Appears well-rested  11/1/2020 0947 by Leilani Swann RN  Outcome: Ongoing  11/1/2020 0614 by Anni Galarza RN  Outcome: Ongoing  11/1/2020 0614 by Anni Galarza RN  Outcome: Ongoing

## 2020-11-01 NOTE — PROGRESS NOTES
[facility] - Progress Note    Today's Date and Time: 11/1/2020, 12:39 PM    Impression :   · Septic Shock   · Bandemia  · CHF with elevated ProBNP  · Pulmonary edema  · Superimposed pneumonia; sputum cultures 10/25/20 grew Klebsiella Pneumoniae moderate growth  · COPD  · Elevated Troponins  · Hypothyroidism    Recommendations:   · Rocephin 2 gm IV q 24 hr (start date 10/23). Stop 11-3-20  · D/C Zithromax 500 mg IV q 24 hr (start date 10/23. Stop date 10-27-20)    Medical Decision Making/Summary/Discussion:   · Patient with CHF  · Rapid deterioration with acute hypoxia and hypercarbia requiring intubation  · CXR with rapid fluid shifts suggesting pulmonary edema. Very high ProBNP  · Can not exclude associated pneumonia at this stage. Pro calcitonin elevated    Infection Control Recommendations   · [place] Precautions    Antimicrobial Stewardship Recommendations   · Simplification of therapy  · Targeted therapy    Coordination of Outpatient Care:   · Estimated Length of IV antimicrobials:TBD  · Patient will need Midline Catheter Insertion: No  · Patient will need PICC line Insertion:Yes  · Patient will need: Home IV , [place],  SNF,  LTAC: TBD  · Patient will need outpatient wound care:No    Chief complaint/reason for consultation:   · Sepsis secondary to pneumonia    History of Present Illness:   Dina Wade is a 77y.o.-year-old  female who was initially admitted on 10/22/2020. Patient seen at the request of Dr. Darian Garrett:  History was obtained from chart review and unobtainable from patient due to mental status. She has a history of hypothyroidism, COPD, iron deficiency anemia, presented to [facility] emergency department on 10/22 for difficulties breathing. Found to have pneumonia, and started on the sepsis protocol. At [facility]. V's, she is unable to provide much history as she is somnolent, but does follow commands.  per EMR review became on the morning of 10/22, prompting her emergency department visit. There they performed a chest x-ray as well as CT scan which was negative for any pulmonary embolism but did show bilateral infiltrates. Found to have a lactic acidosis as well, and started on Rocephin and azithromycin. Initially hypercapnic, her mentation improved on BiPAP on 10/22. CURRENT EVALUATION: 11/01/20     Patient evaluated and examined in the ICU. Afebrile  VS stable  Intubated    WBC are normal, on Rocephin 2 gm IV q 24 hr (start date 10/23). Expect to stop 11-3-20. Plan is to extubate her tomorrow. She has been following commands. No secretions. In NSR today. NSTEMI, off of heparin drip, new onset reduced systolic dysfunction, EF 52%, apical hypokinesis. Cardiac cath done. Mild CAD. Recommend medical therapy for CAD and dilated cardiomyopathy. IV Lasix on hold due to alkalosis, started on Aldactone. Life vest on discharge. Cardiology signed off.     UA 10-30-20 shows turbid urine, moderate Hgb, no bacteria. CXR:  · 10/24 increased bilat infiltrates  · 10/26 shows bilateral upper and lower lobe infiltrates unchanged from previous x-ray. · 10-27-20: shows coarse diffuse bilateral infiltrates with superimposed haziness, worse than yesterday. Patient shows fluid shifts. · 10-28-20: Multifocal interstitial and consolidative infiltrates seen throughout the lungs bilaterally, with worsening consolidation in the right lung base. · 10-30-20: Bilateral airspace disease again noted, although less prominent      Labs:  BUN: 15>14>19>24>23>25  Creatinine: 0.37>0.47>0.52>0.48    WBC: 13.7 >14.2>9.7>6.5>9.9>8.6>8.9  Hgb: 12 >10.5 >11.3>10.9>10.6>10.4-->9.6  Platelet: 248 >794 >774>180>542>086>229-->740    Lactic acid 6.2 > 2.9 > 2.0>1.7>5    Cultures:  Urine:  ·   Blood:  · 10/22/20: No growth   Sputum :  · 10/25/20: Klebsiella pneumoniae moderate growth.   Wound:     CSF         Discussed with patient, RN,     I have personally on file    Food insecurity     Worry: Not on file     Inability: Not on file    Transportation needs     Medical: Not on file     Non-medical: Not on file   Tobacco Use    Smoking status: Former Smoker     Last attempt to quit: 1976     Years since quittin.0    Smokeless tobacco: Never Used   Substance and Sexual Activity    Alcohol use: No    Drug use: No    Sexual activity: Not on file   Lifestyle    Physical activity     Days per week: Not on file     Minutes per session: Not on file    Stress: Not on file   Relationships    Social connections     Talks on phone: Not on file     Gets together: Not on file     Attends Voodoo service: Not on file     Active member of club or organization: Not on file     Attends meetings of clubs or organizations: Not on file     Relationship status: Not on file    Intimate partner violence     Fear of current or ex partner: Not on file     Emotionally abused: Not on file     Physically abused: Not on file     Forced sexual activity: Not on file   Other Topics Concern    Not on file   Social History Narrative    Not on file       Family History:   No family history on file. Allergies:   Patient has no known allergies.      Review of Systems:   Review of Systems   Unable to perform ROS: Intubated          Physical Examination :     Patient Vitals for the past 8 hrs:   BP Temp Temp src Pulse Resp SpO2 Weight   20 1215 -- -- -- 88 20 95 % --   20 1200 (!) 144/74 -- -- 89 17 95 % --   20 1145 -- -- -- 90 17 94 % --   20 1140 -- 99.5 °F (37.5 °C) Oral -- -- -- --   20 1130 -- -- -- 89 18 94 % --   20 1121 -- -- -- 90 18 94 % --   20 1115 -- -- -- 90 16 94 % --   20 1100 (!) 148/77 -- -- 88 17 94 % --   20 1045 -- -- -- 87 17 95 % --   20 1030 -- -- -- 86 17 96 % --   20 1015 -- -- -- 85 19 95 % --   20 1010 -- -- -- 85 16 94 % --   20 1009 -- -- -- 86 18 95 % --   20 1000 (!)

## 2020-11-01 NOTE — PROGRESS NOTES
Impression: Lattice degeneration of retina, left eye: H35.412. OS. Condition: stable. s/p PCA OS 2/5/2019 Plan: Discussed diagnosis in detail with patient. Advised patient of condition. Exam and OCT shows retina is fully attached and stable with PVD. No treatment is required. VITAL SIGNS:  BP (!) 144/74   Pulse 88   Temp 99.5 °F (37.5 °C) (Oral)   Resp 20   Ht 5' 4\" (1.626 m)   Wt 155 lb 13.8 oz (70.7 kg)   SpO2 95%   BMI 26.75 kg/m²     Tmax over 24 hours:  Temp (24hrs), Av.4 °F (37.4 °C), Min:99 °F (37.2 °C), Max:99.7 °F (37.6 °C)      Patient Vitals for the past 6 hrs:   BP Temp Temp src Pulse Resp SpO2   20 1215 -- -- -- 88 20 95 %   20 1200 (!) 144/74 -- -- 89 17 95 %   20 1145 -- -- -- 90 17 94 %   20 1140 -- 99.5 °F (37.5 °C) Oral -- -- --   20 1130 -- -- -- 89 18 94 %   20 1121 -- -- -- 90 18 94 %   20 1115 -- -- -- 90 16 94 %   20 1100 (!) 148/77 -- -- 88 17 94 %   20 1045 -- -- -- 87 17 95 %   20 1030 -- -- -- 86 17 96 %   20 1015 -- -- -- 85 19 95 %   20 1010 -- -- -- 85 16 94 %   20 1009 -- -- -- 86 18 95 %   20 1000 (!) 141/71 -- -- 85 16 94 %   20 0945 -- -- -- 80 15 95 %   20 0930 -- -- -- 85 15 95 %   20 0915 -- -- -- 85 15 94 %   20 0900 (!) 149/67 -- -- 89 17 94 %   20 0845 -- -- -- 93 18 95 %   20 0830 -- -- -- 92 19 95 %   20 0815 -- -- -- 92 21 96 %         Intake/Output Summary (Last 24 hours) at 2020 1403  Last data filed at 2020 1355  Gross per 24 hour   Intake 3427.28 ml   Output 2200 ml   Net 1227.28 ml     Wt Readings from Last 2 Encounters:   20 155 lb 13.8 oz (70.7 kg)   10/22/20 164 lb (74.4 kg)     Body mass index is 26.75 kg/m². PHYSICAL EXAMINATION:  General appearance - Intubated, sedated. Mental status: follows commands, but slowly. Will wiggle toes. Chest - clear to auscultation, no wheezes, rales or rhonchi, symmetric air entry  Heart - normal rate and regular rhythm  Abdomen - soft, nontender, nondistended, no masses or organomegaly  Neurological - Spontaneously opens eyes and looks around room. Will wiggle toes for me, squeezes RN's hand.   Extremities - no pedal edema noted  Skin - No rashes over exposed skin      MEDICATIONS:    Scheduled Meds:   metoprolol tartrate  25 mg Oral BID    amiodarone  200 mg Oral BID    [Held by provider] furosemide  20 mg Intravenous Daily    oxyCODONE  10 mg Oral Q8H    fleet  1 enema Rectal Once    midodrine  10 mg Oral TID WC    spironolactone  25 mg Oral Daily    docusate  100 mg Oral Daily    polyethylene glycol  17 g Oral Daily    cefTRIAXone (ROCEPHIN) IV  2 g Intravenous Q24H    enoxaparin  1 mg/kg Subcutaneous BID    sodium chloride flush  10 mL Intravenous 2 times per day    aspirin  81 mg Oral Daily    ipratropium-albuterol  1 ampule Inhalation Q6H    famotidine (PEPCID) injection  20 mg Intravenous BID    anesthetic and narcotic custom mixture   Intrathecal Once    levothyroxine  150 mcg Oral Daily    pregabalin  300 mg Oral BID    sodium chloride flush  10 mL Intravenous 2 times per day     Continuous Infusions:   propofol Stopped (11/01/20 0747)    norepinephrine Stopped (10/30/20 2017)    fentaNYL Stopped (10/31/20 1243)     PRN Meds:   bisacodyl, 10 mg, Daily PRN  sodium chloride flush, 10 mL, PRN  acetaminophen, 650 mg, Q4H PRN  potassium chloride, 20 mEq, PRN  albuterol, 2.5 mg, As Directed RT PRN  sodium chloride flush, 10 mL, PRN  ondansetron, 4 mg, Q8H PRN          VENT SETTINGS (Comprehensive) (if applicable):  Vent Information  $Ventilation: $Subsequent Day  Skin Assessment: Clean, dry, & intact  Equipment ID: [id number]  Equipment Changed: Expiratory Filter  Vent Type: Servo i  Vent Mode: CPAP  Vt Ordered: 480 mL  Rate Set: (S) 12 bmp  Pressure Support: 8 cmH20  FiO2 : 40 %  SpO2: 95 %  SpO2/FiO2 ratio: 235  Sensitivity: 2  PEEP/CPAP: 5  I Time/ I Time %: 1.05 s  Humidification Source: Heated wire  Humidification Temp: 37  Humidification Temp Measured: 36.7  Circuit Condensation: Drained  Nitric Oxide/Epoprostenol In Use?: No  Mask Type: Full face mask  Mask Size: Small  Additional Respiratory  Assessments  Pulse: 88  Resp: 20  SpO2: 95 %  $End Tidal CO2: 30  pCO2 (TCOM, mmHg): 42 mmHg  Position: Semi-Mcclendon's  Humidification Source: Heated wire  Humidification Temp: 37  Circuit Condensation: Drained  Oral Care Completed?: Yes  Oral Care: Mouthwash, Lip moisturizer applied, Mouth suctioned  Subglottic Suction Done?: Yes  Cuff Pressure (cm H2O): (MLT)    ABGs:     Laboratory findings:    Complete Blood Count:   Recent Labs     10/30/20  0543 10/31/20  0417 11/01/20  0426   WBC 8.6 8.6 8.9   HGB 10.4* 9.6* 10.2*   HCT 33.8* 31.2* 33.0*    223 259        Last 3 Blood Glucose:   Recent Labs     10/30/20  0543 10/31/20  0417 11/01/20  0426   GLUCOSE 148* 113* 140*        PT/INR:    Lab Results   Component Value Date    PROTIME 12.7 10/22/2020    INR 1.0 10/22/2020     PTT:    Lab Results   Component Value Date    APTT 57.1 10/27/2020       Comprehensive Metabolic Profile:   Recent Labs     10/30/20  0543 10/31/20  0417 11/01/20  0426    147* 142   K 3.5* 3.5* 3.8    112* 105   CO2 29 28 27   BUN 23 25* 25*   CREATININE 0.52 0.46* 0.48*   GLUCOSE 148* 113* 140*   CALCIUM 9.3 9.1 8.8      Magnesium:   Lab Results   Component Value Date    MG 2.3 11/01/2020     Phosphorus:   Lab Results   Component Value Date    PHOS 2.5 11/01/2020     Ionized Calcium:   Lab Results   Component Value Date    CAION 1.17 10/24/2020      Troponin: No results for input(s): TROPONINI in the last 72 hours.     Microbiology:    Cultures during this admission:     Blood cultures:                 [] None drawn      [x] Negative             []  Positive (Details:  )  Urine Culture:                   [] None drawn      [x] Negative             []  Positive (Details:  )  Sputum Culture:               [] None drawn       [] Negative             [x]  Positive (Details:  )   Endotracheal aspirate:     [] None drawn       [] Negative             []  Positive (Details:  )     Radiology/Imaging:     Chest Xray (11/1/2020):   CT HEAD WO CONTRAST Final Result   No acute intracranial abnormality. MRI may be obtained if clinically   indicated. XR CHEST PORTABLE   Final Result   1. Endotracheal and enteric tubes remain in place. Interval right IJ   central venous catheter with distal tip at the mid SVC. 2.  Diffuse hazy airspace and interstitial opacities in the lungs are   slightly increased when compared to the radiograph performed earlier this   morning. XR CHEST PORTABLE   Final Result   1. Endotracheal tube remains in appropriate position. 2. Bilateral airspace disease again noted, although less prominent. XR CHEST PORTABLE   Final Result   Multifocal interstitial and consolidative infiltrates seen throughout the   lungs bilaterally, with worsening consolidation in the right lung base. XR CHEST PORTABLE   Final Result   1. Endotracheal tube terminates at the level of the alejandro and should be   pulled back approximately 2-3 cm.   2. Redemonstration of coarse, diffuse bilateral interstitial opacities with   slightly increased superimposed hazy airspace opacity, possibly edema or   worsening pneumonia. XR CHEST PORTABLE   Final Result   Coarse bilateral airspace opacities within the upper and lower lobes, not   significantly changed, suggesting a multi lobar pneumonia. XR CHEST PORTABLE   Final Result   Partial clearing of bilateral lung opacities. XR CHEST PORTABLE   Final Result   Increased bilateral lung opacities. XR CHEST PORTABLE   Final Result   Bilateral lung opacities likely pneumonia. Enteric tube with the tip within the proximal stomach. Consider slightly   advancing. XR ABDOMEN FOR NG/OG/NE TUBE PLACEMENT   Final Result   Life-support devices as above. Similar-appearing diffuse bilateral heterogeneous opacities and multifocal   consolidations. XR CHEST PORTABLE   Final Result   Life-support devices as above.       Similar-appearing diffuse bilateral heterogeneous opacities and multifocal   consolidations. XR CHEST PORTABLE   Final Result   No significant interval change in multifocal bilateral consolidation, most   concerning for pneumonia. XR CHEST PORTABLE   Final Result   No significant change in the multifocal airspace opacities worrisome for   multifocal pneumonia. ASSESSMENT:     Patient Active Problem List    Diagnosis Date Noted    Acute metabolic encephalopathy 18/17/8660     Priority: High     Class: Acute    Encephalopathy     Acute respiratory failure with hypoxia and hypercapnia (HCC)     Elevated troponin     Severe sepsis ([facility] [address] 10/22/2020    COPD exacerbation ([facility] [address] 10/22/2020    Sepsis due to pneumonia ([facility] [address] 08/16/2020    Lactic acidosis 08/16/2020    Septic shock ([facility] [address] 08/16/2020    Status post surgery 12/20/2018    Pneumonia of both lower lobes due to infectious organism 10/05/2018    Hypothyroidism 10/05/2018    Major depressive disorder 10/05/2018    Chronic midline low back pain without sciatica 10/05/2018    Iron deficiency anemia 10/05/2018           PLAN:     WEAN PER PROTOCOL:  [] No   [x] Yes  [] N/A    DISCONTINUE ANY LABS:   [x] No   [] Yes    ICU PROPHYLAXIS:  Stress ulcer:  [] PPI Agent  [x] [id number] [] Sucralfate  [] Other:  VTE:   [] Enoxaparin  [] Unfract. Heparin Subcut  [] EPC Cuffs    NUTRITION:  [] NPO [] Tube Feeding (Specify: ) [] TPN  [x] PO (Diet: DIET TUBE FEED CONTINUOUS/CYCLIC NPO; STANDARD WITHOUT FIBER; Nasogastric; Continuous; 10; 65)    HOME MEDICATIONS RECONCILED: [] No  [x] Yes    INSULIN DRIP:   [x] No   [] Yes    CONSULTATION NEEDED:  [x] No   [] Yes    FAMILY UPDATED:    [x] No   [] Yes    TRANSFER OUT OF ICU:   [x] No   [] Yes    ADDITIONAL PLAN:    Neuro  -Fentanyl, propofol, off versed gtt this AM. Will add oral roxicodone q8 hours  - ? Posturing? Neuro critical care has been consulted. CT head was negative. EEG negative.  Following commands this AM.     CV  - A fib: Therapeutic Lovenox. Oral amiodarone. -EKG changes: cardiology aware. Recently had cath without any lesion amenable to stenting. Monitor. -PVCs: lopressor increased today to 25 mg BID  -Cardiomyopathy: EF 30%. Will need lifepak on discharge. Holding lasix for now given hypotension. -hypotension - midodrine, solucortef. -Possible LV thrombus: 70 mg lovenox BID     Pulm  - Intubated, PRVC 22 / 480 / 8 / 40%  -AB.45 / 45 / 76 / 32  -P:F 190  -CXR this AM.     GI  - TF at goal of 65  - BG 120s-140s, on solucortef. -Pepcid GI ppx  -+ BM 10/31     /Renal  -UOP 2.0 cc/kg/hr. +8.7 L since admit  -Hypokalemia at 3.5. replace per sliding scale. -Hypernatremia: resolved this AM. Hold FWBs  -Creatinine, electrolytes o/w WNL  - 20 mg Lasix yesterday with good response. Hold today.     ID/Heme  -Pneumonia? ID consulted. Finished azithromycin, on rocephin 2 g.  -WBC stable between 6-9.   -Hb stable around 10  -Tmax 37.6     Other  -h/o hypothyroidism. Cont home synthroid 150 mch QD  -PT/OT    Malachy Claude,   [address] [facility],  [address]  2020, 2:03 PM    Attending Physician Statement  I have discussed the care of Mar Lines, including pertinent history and exam findings with the resident. I have reviewed the key elements of all parts of the encounter with the resident. I have seen and examined the patient with the resident. I agree with the assessment and plan and status of the problem list as documented.     I seen the patient during my round today, reviewed chart, labs and medications reviewed. She is slightly more awake today intermittently she respond to name and track with her eyes when I saw her she did not follow commands. She has been off propofol since this morning when she had been off fentanyl drip and neurologically she is improving. She is still on LTM E per neuro critical care.   ABG 7.4 //75/30 1.5  She has had frequent PVCs she has baseline left bundle branch block cardiology has been following. We will increase Lopressor to 25 mg twice daily. Hold lasix and free water at this time sodium is 142. Monitor intake and output and urine output and will use Lasix intermittently. Continue with Aldactone. Continue with current ventilator setting PRVC/16/480/5/40 percent. Daily spontaneous breathing trial to be started. We will continue with oxycodone 10 mg every 8 hours she is also on Dilaudid pump. If she become very agitated then will likely use Precedex drip low-dose.       Discussed with nursing staff, treatment and plan discussed. Discussed with respiratory therapist.     Total critical care time caring for this patient with life threatening, unstable organ failure, including direct patient contact, management of life support systems, review of data including imaging and labs, discussions with other team members and physicians at least 27  Min so far today, excluding procedures.        Please note that this chart was generated using voice recognition Dragon dictation software.  Although every effort was made to ensure the accuracy of this automated transcription, some errors in transcription may have occurred.   Enriqueta Grant MD  11/1/2020 12:39 PM

## 2020-11-02 ENCOUNTER — APPOINTMENT (OUTPATIENT)
Dept: GENERAL RADIOLOGY | Age: 75
DRG: 870 | End: 2020-11-02
Attending: INTERNAL MEDICINE
Payer: MEDICARE

## 2020-11-02 LAB
ABSOLUTE EOS #: 0.05 K/UL (ref 0–0.44)
ABSOLUTE IMMATURE GRANULOCYTE: 0.1 K/UL (ref 0–0.3)
ABSOLUTE LYMPH #: 1.16 K/UL (ref 1.1–3.7)
ABSOLUTE MONO #: 0.74 K/UL (ref 0.1–1.2)
ALLEN TEST: ABNORMAL
ANION GAP SERPL CALCULATED.3IONS-SCNC: 12 MMOL/L (ref 9–17)
BASOPHILS # BLD: 0 % (ref 0–2)
BASOPHILS ABSOLUTE: <0.03 K/UL (ref 0–0.2)
BUN BLDV-MCNC: 21 MG/DL (ref 8–23)
BUN/CREAT BLD: ABNORMAL (ref 9–20)
CALCIUM SERPL-MCNC: 8.8 MG/DL (ref 8.6–10.4)
CHLORIDE BLD-SCNC: 103 MMOL/L (ref 98–107)
CO2: 28 MMOL/L (ref 20–31)
CREAT SERPL-MCNC: 0.45 MG/DL (ref 0.5–0.9)
DIFFERENTIAL TYPE: ABNORMAL
EKG ATRIAL RATE: 89 BPM
EKG P AXIS: 69 DEGREES
EKG P-R INTERVAL: 154 MS
EKG Q-T INTERVAL: 404 MS
EKG QRS DURATION: 92 MS
EKG QTC CALCULATION (BAZETT): 491 MS
EKG R AXIS: -24 DEGREES
EKG T AXIS: 114 DEGREES
EKG VENTRICULAR RATE: 89 BPM
EOSINOPHILS RELATIVE PERCENT: 1 % (ref 1–4)
FIO2: 40
GFR AFRICAN AMERICAN: >60 ML/MIN
GFR NON-AFRICAN AMERICAN: >60 ML/MIN
GFR SERPL CREATININE-BSD FRML MDRD: ABNORMAL ML/MIN/{1.73_M2}
GFR SERPL CREATININE-BSD FRML MDRD: ABNORMAL ML/MIN/{1.73_M2}
GLUCOSE BLD-MCNC: 147 MG/DL (ref 70–99)
GLUCOSE BLD-MCNC: 163 MG/DL (ref 74–100)
HCT VFR BLD CALC: 31.4 % (ref 36.3–47.1)
HEMOGLOBIN: 9.8 G/DL (ref 11.9–15.1)
IMMATURE GRANULOCYTES: 1 %
LYMPHOCYTES # BLD: 11 % (ref 24–43)
MAGNESIUM: 2.3 MG/DL (ref 1.6–2.6)
MCH RBC QN AUTO: 30.7 PG (ref 25.2–33.5)
MCHC RBC AUTO-ENTMCNC: 31.2 G/DL (ref 28.4–34.8)
MCV RBC AUTO: 98.4 FL (ref 82.6–102.9)
MODE: ABNORMAL
MONOCYTES # BLD: 7 % (ref 3–12)
NEGATIVE BASE EXCESS, ART: ABNORMAL (ref 0–2)
NRBC AUTOMATED: 0 PER 100 WBC
O2 DEVICE/FLOW/%: ABNORMAL
PATIENT TEMP: ABNORMAL
PDW BLD-RTO: 14.3 % (ref 11.8–14.4)
PHOSPHORUS: 2.6 MG/DL (ref 2.6–4.5)
PLATELET # BLD: 260 K/UL (ref 138–453)
PLATELET ESTIMATE: ABNORMAL
PMV BLD AUTO: 11.2 FL (ref 8.1–13.5)
POC HCO3: 33.2 MMOL/L (ref 21–28)
POC LACTIC ACID: 0.53 MMOL/L (ref 0.56–1.39)
POC O2 SATURATION: 94 % (ref 94–98)
POC PCO2 TEMP: ABNORMAL MM HG
POC PCO2: 46.4 MM HG (ref 35–48)
POC PH TEMP: ABNORMAL
POC PH: 7.46 (ref 7.35–7.45)
POC PO2 TEMP: ABNORMAL MM HG
POC PO2: 69.8 MM HG (ref 83–108)
POSITIVE BASE EXCESS, ART: 8 (ref 0–3)
POTASSIUM SERPL-SCNC: 3.3 MMOL/L (ref 3.7–5.3)
RBC # BLD: 3.19 M/UL (ref 3.95–5.11)
RBC # BLD: ABNORMAL 10*6/UL
SAMPLE SITE: ABNORMAL
SEG NEUTROPHILS: 80 % (ref 36–65)
SEGMENTED NEUTROPHILS ABSOLUTE COUNT: 8.68 K/UL (ref 1.5–8.1)
SODIUM BLD-SCNC: 143 MMOL/L (ref 135–144)
TCO2 (CALC), ART: 35 MMOL/L (ref 22–29)
WBC # BLD: 10.7 K/UL (ref 3.5–11.3)
WBC # BLD: ABNORMAL 10*3/UL

## 2020-11-02 PROCEDURE — 6370000000 HC RX 637 (ALT 250 FOR IP): Performed by: STUDENT IN AN ORGANIZED HEALTH CARE EDUCATION/TRAINING PROGRAM

## 2020-11-02 PROCEDURE — 6360000002 HC RX W HCPCS: Performed by: STUDENT IN AN ORGANIZED HEALTH CARE EDUCATION/TRAINING PROGRAM

## 2020-11-02 PROCEDURE — 74018 RADEX ABDOMEN 1 VIEW: CPT

## 2020-11-02 PROCEDURE — 85025 COMPLETE CBC W/AUTO DIFF WBC: CPT

## 2020-11-02 PROCEDURE — 84100 ASSAY OF PHOSPHORUS: CPT

## 2020-11-02 PROCEDURE — 2700000000 HC OXYGEN THERAPY PER DAY

## 2020-11-02 PROCEDURE — 2580000003 HC RX 258: Performed by: STUDENT IN AN ORGANIZED HEALTH CARE EDUCATION/TRAINING PROGRAM

## 2020-11-02 PROCEDURE — 94761 N-INVAS EAR/PLS OXIMETRY MLT: CPT

## 2020-11-02 PROCEDURE — 94640 AIRWAY INHALATION TREATMENT: CPT

## 2020-11-02 PROCEDURE — 83735 ASSAY OF MAGNESIUM: CPT

## 2020-11-02 PROCEDURE — 94770 HC ETCO2 MONITOR DAILY: CPT

## 2020-11-02 PROCEDURE — 99233 SBSQ HOSP IP/OBS HIGH 50: CPT | Performed by: INTERNAL MEDICINE

## 2020-11-02 PROCEDURE — 2500000003 HC RX 250 WO HCPCS: Performed by: STUDENT IN AN ORGANIZED HEALTH CARE EDUCATION/TRAINING PROGRAM

## 2020-11-02 PROCEDURE — 6370000000 HC RX 637 (ALT 250 FOR IP): Performed by: INTERNAL MEDICINE

## 2020-11-02 PROCEDURE — 82803 BLOOD GASES ANY COMBINATION: CPT

## 2020-11-02 PROCEDURE — 80048 BASIC METABOLIC PNL TOTAL CA: CPT

## 2020-11-02 PROCEDURE — 99291 CRITICAL CARE FIRST HOUR: CPT | Performed by: INTERNAL MEDICINE

## 2020-11-02 PROCEDURE — 83605 ASSAY OF LACTIC ACID: CPT

## 2020-11-02 PROCEDURE — 93010 ELECTROCARDIOGRAM REPORT: CPT | Performed by: INTERNAL MEDICINE

## 2020-11-02 PROCEDURE — 2000000000 HC ICU R&B

## 2020-11-02 PROCEDURE — 82947 ASSAY GLUCOSE BLOOD QUANT: CPT

## 2020-11-02 PROCEDURE — 94003 VENT MGMT INPAT SUBQ DAY: CPT

## 2020-11-02 RX ORDER — OXYCODONE HYDROCHLORIDE 5 MG/1
5 TABLET ORAL EVERY 6 HOURS PRN
Status: DISCONTINUED | OUTPATIENT
Start: 2020-11-02 | End: 2020-11-06 | Stop reason: HOSPADM

## 2020-11-02 RX ORDER — POTASSIUM CHLORIDE 7.45 MG/ML
40 INJECTION INTRAVENOUS ONCE
Status: COMPLETED | OUTPATIENT
Start: 2020-11-02 | End: 2020-11-02

## 2020-11-02 RX ADMIN — IPRATROPIUM BROMIDE AND ALBUTEROL SULFATE 1 AMPULE: .5; 3 SOLUTION RESPIRATORY (INHALATION) at 19:55

## 2020-11-02 RX ADMIN — METOPROLOL TARTRATE 25 MG: 25 TABLET ORAL at 19:22

## 2020-11-02 RX ADMIN — MIDODRINE HYDROCHLORIDE 10 MG: 5 TABLET ORAL at 17:32

## 2020-11-02 RX ADMIN — LEVOTHYROXINE SODIUM 150 MCG: 75 TABLET ORAL at 08:26

## 2020-11-02 RX ADMIN — METOPROLOL TARTRATE 25 MG: 25 TABLET ORAL at 08:27

## 2020-11-02 RX ADMIN — SPIRONOLACTONE 25 MG: 25 TABLET ORAL at 08:28

## 2020-11-02 RX ADMIN — OXYCODONE HYDROCHLORIDE 10 MG: 5 TABLET ORAL at 04:33

## 2020-11-02 RX ADMIN — MIDODRINE HYDROCHLORIDE 10 MG: 5 TABLET ORAL at 11:35

## 2020-11-02 RX ADMIN — PREGABALIN 300 MG: 100 CAPSULE ORAL at 19:22

## 2020-11-02 RX ADMIN — FUROSEMIDE 20 MG: 10 INJECTION, SOLUTION INTRAMUSCULAR; INTRAVENOUS at 10:44

## 2020-11-02 RX ADMIN — IPRATROPIUM BROMIDE AND ALBUTEROL SULFATE 1 AMPULE: .5; 3 SOLUTION RESPIRATORY (INHALATION) at 03:38

## 2020-11-02 RX ADMIN — ASPIRIN 81 MG: 81 TABLET, CHEWABLE ORAL at 08:26

## 2020-11-02 RX ADMIN — MIDODRINE HYDROCHLORIDE 10 MG: 5 TABLET ORAL at 08:27

## 2020-11-02 RX ADMIN — FAMOTIDINE 20 MG: 10 INJECTION INTRAVENOUS at 08:27

## 2020-11-02 RX ADMIN — POLYETHYLENE GLYCOL 3350 17 G: 17 POWDER, FOR SOLUTION ORAL at 08:28

## 2020-11-02 RX ADMIN — ENOXAPARIN SODIUM 70 MG: 80 INJECTION SUBCUTANEOUS at 19:20

## 2020-11-02 RX ADMIN — SODIUM CHLORIDE, PRESERVATIVE FREE 10 ML: 5 INJECTION INTRAVENOUS at 08:27

## 2020-11-02 RX ADMIN — POTASSIUM CHLORIDE 40 MEQ: 10 INJECTION, SOLUTION INTRAVENOUS at 08:40

## 2020-11-02 RX ADMIN — IPRATROPIUM BROMIDE AND ALBUTEROL SULFATE 1 AMPULE: .5; 3 SOLUTION RESPIRATORY (INHALATION) at 07:42

## 2020-11-02 RX ADMIN — SODIUM CHLORIDE, PRESERVATIVE FREE 10 ML: 5 INJECTION INTRAVENOUS at 19:21

## 2020-11-02 RX ADMIN — AMIODARONE HYDROCHLORIDE 200 MG: 200 TABLET ORAL at 08:26

## 2020-11-02 RX ADMIN — IPRATROPIUM BROMIDE AND ALBUTEROL SULFATE 1 AMPULE: .5; 3 SOLUTION RESPIRATORY (INHALATION) at 16:25

## 2020-11-02 RX ADMIN — ONDANSETRON 4 MG: 2 INJECTION INTRAMUSCULAR; INTRAVENOUS at 05:10

## 2020-11-02 RX ADMIN — SODIUM CHLORIDE, PRESERVATIVE FREE 10 ML: 5 INJECTION INTRAVENOUS at 08:29

## 2020-11-02 RX ADMIN — OXYCODONE HYDROCHLORIDE 10 MG: 5 TABLET ORAL at 11:38

## 2020-11-02 RX ADMIN — PREGABALIN 300 MG: 100 CAPSULE ORAL at 08:26

## 2020-11-02 RX ADMIN — ENOXAPARIN SODIUM 70 MG: 80 INJECTION SUBCUTANEOUS at 08:27

## 2020-11-02 RX ADMIN — STANDARDIZED SENNA CONCENTRATE AND DOCUSATE SODIUM 2 TABLET: 8.6; 5 TABLET ORAL at 08:26

## 2020-11-02 RX ADMIN — SODIUM CHLORIDE, PRESERVATIVE FREE 10 ML: 5 INJECTION INTRAVENOUS at 19:20

## 2020-11-02 RX ADMIN — CEFTRIAXONE SODIUM 2 G: 2 INJECTION, POWDER, FOR SOLUTION INTRAMUSCULAR; INTRAVENOUS at 11:32

## 2020-11-02 RX ADMIN — AMIODARONE HYDROCHLORIDE 200 MG: 200 TABLET ORAL at 19:21

## 2020-11-02 RX ADMIN — FAMOTIDINE 20 MG: 10 INJECTION INTRAVENOUS at 19:20

## 2020-11-02 ASSESSMENT — PAIN SCALES - GENERAL
PAINLEVEL_OUTOF10: 0
PAINLEVEL_OUTOF10: 5

## 2020-11-02 ASSESSMENT — PULMONARY FUNCTION TESTS
PIF_VALUE: 7
PIF_VALUE: 6
PIF_VALUE: 16
PIF_VALUE: 14
PIF_VALUE: 14

## 2020-11-02 NOTE — PLAN OF CARE
Problem: OXYGENATION/RESPIRATORY FUNCTION  Goal: Patient will maintain patent airway  11/1/2020 2018 by Josué Yeh RCP  Outcome: Ongoing     Problem: OXYGENATION/RESPIRATORY FUNCTION  Goal: Patient will achieve/maintain normal respiratory rate/effort  Description: Respiratory rate and effort will be within normal limits for the patient  11/1/2020 2018 by Josué Yeh RCP  Outcome: Ongoing     Problem: MECHANICAL VENTILATION  Goal: Patient will maintain patent airway  Outcome: Ongoing     Problem: MECHANICAL VENTILATION  Goal: Oral health is maintained or improved  Outcome: Ongoing     Problem: MECHANICAL VENTILATION  Goal: ET tube will be managed safely  Outcome: Ongoing     Problem: MECHANICAL VENTILATION  Goal: Ability to express needs and understand communication  Outcome: Ongoing     Problem: MECHANICAL VENTILATION  Goal: Mobility/activity is maintained at optimum level for patient  Outcome: Ongoing     Problem: SKIN INTEGRITY  Goal: Skin integrity is maintained or improved  11/1/2020 2018 by Josué Yeh RCP  Outcome: Ongoing     BRONCHOSPASM/BRONCHOCONSTRICTION     [x]         IMPROVE AERATION/BREATH SOUNDS  [x]   ADMINISTER BRONCHODILATOR THERAPY AS APPROPRIATE  [x]   ASSESS BREATH SOUNDS  [x]   IMPLEMENT AEROSOL/MDI PROTOCOL  [x]   PATIENT EDUCATION AS NEEDED

## 2020-11-02 NOTE — PROGRESS NOTES
Critical Care Team - Daily Progress Note      Date and time: 11/2/2020 7:40 AM  Patient's name:  Anderson Watt  Medical Record Number: [id number]  Patient's account/billing number: [de-identified]  Patient's YOB: 1945  Age: 77 y.o. Date of Admission: 10/22/2020  6:58 PM  Length of stay during current admission: 11      Primary Care Physician: Gertrude Rob MD  ICU Attending Physician: Dr. Valorie Fernando Status: Full Code    Reason for ICU admission: Pneumonia vs CHF    SUBJECTIVE:     OVERNIGHT EVENTS: No issues overnight noted. This morning patient had an episode of vomiting, tube feed was held. As per nurse, patient has not had a bowel movement in last 4 to 5 days. Continue to be intubated on PRVC mode 12/480/5/40. ABG 7.46/46/69/33  Patient weaned all day yesterday. Central and art line removed this morning. LTME discontinued yesterday, no evidence of seizure. Patient is alert and following all simple commands. Off of sedation.   UOP 1.8 L in last 24-hour, total intake 2.2 L, net +8.8 L since admit    AWAKE & FOLLOWING COMMANDS:  [] No   [x] Yes    CURRENT VENTILATION STATUS:     [x] Ventilator  [] BIPAP  [] Nasal Cannula [] Room Air      IF INTUBATED, ET TUBE MARKING AT LOWER LIP:       cms    SECRETIONS Amount:  [] Small [] Moderate  [] Large  [] None  Color:     [] White [] Colored  [] Bloody    SEDATION:  RAAS Score:  [] Propofol gtt  [] Versed gtt  [] Ativan gtt   [] No Sedation    PARALYZED:  [x] No    [] Yes    DIARRHEA:                [x] No                [] Yes  (C. Difficile status: [] positive                                                                                                                       [] negative                                                                                                                     [] pending)    VASOPRESSORS:  [x] No    [] Yes    If yes -   [] Levophed       [] Dopamine     [] Vasopressin       [] Dobutamine  [] Phenylephrine [] Epinephrine    CENTRAL LINES:     [] No   [x] Yes   (Date of Insertion:  10/30 )           If yes -     [x] Right IJ     [] Left IJ [] Right Femoral [] Left Femoral                   [] Right Subclavian [] Left Subclavian       LUNA'S CATHETER:   [] No   [x] Yes  (Date of Insertion: 10/22  )     URINE OUTPUT:            [x] Good   [] Low              [] Anuric      OBJECTIVE:     VITAL SIGNS:  BP (!) 156/92   Pulse 84   Temp 99 °F (37.2 °C) (Oral)   Resp 17   Ht 5' 4\" (1.626 m)   Wt 155 lb 13.8 oz (70.7 kg)   SpO2 95%   BMI 26.75 kg/m²     Tmax over 24 hours:  Temp (24hrs), Av.4 °F (37.4 °C), Min:99 °F (37.2 °C), Max:99.5 °F (37.5 °C)      Patient Vitals for the past 6 hrs:   BP Temp Temp src Pulse Resp SpO2   20 0700 (!) 156/92 -- -- 84 17 95 %   20 0600 125/62 -- -- 83 15 95 %   20 0500 -- -- -- 94 20 96 %   20 0400 (!) 181/74 99 °F (37.2 °C) Oral 88 17 96 %   20 0338 -- -- -- 79 18 96 %   20 0300 133/60 -- -- 75 14 96 %   20 0200 (!) 142/69 -- -- 79 16 95 %         Intake/Output Summary (Last 24 hours) at 2020 0740  Last data filed at 2020 0400  Gross per 24 hour   Intake 2200.28 ml   Output 1855 ml   Net 345.28 ml     Wt Readings from Last 2 Encounters:   20 155 lb 13.8 oz (70.7 kg)   10/22/20 164 lb (74.4 kg)     Body mass index is 26.75 kg/m². PHYSICAL EXAMINATION:  General appearance - Intubated, sedated. Mental status: follows commands, but slowly. Will wiggle toes. Chest - clear to auscultation, no wheezes, rales or rhonchi, symmetric air entry  Heart - normal rate and regular rhythm  Abdomen - soft, nontender, nondistended, no masses or organomegaly  Neurological - Spontaneously opens eyes and looks around room. Will wiggle toes for me, squeezes RN's hand.   Extremities - no pedal edema noted  Skin - No rashes over exposed skin      MEDICATIONS:    Scheduled Meds:   potassium chloride  40 mEq Intravenous Once    metoprolol tartrate  25 mg Oral BID    sennosides-docusate sodium  2 tablet Oral Daily    amiodarone  200 mg Oral BID    [Held by provider] furosemide  20 mg Intravenous Daily    oxyCODONE  10 mg Oral Q8H    fleet  1 enema Rectal Once    midodrine  10 mg Oral TID WC    spironolactone  25 mg Oral Daily    polyethylene glycol  17 g Oral Daily    cefTRIAXone (ROCEPHIN) IV  2 g Intravenous Q24H    enoxaparin  1 mg/kg Subcutaneous BID    sodium chloride flush  10 mL Intravenous 2 times per day    aspirin  81 mg Oral Daily    ipratropium-albuterol  1 ampule Inhalation Q6H    famotidine (PEPCID) injection  20 mg Intravenous BID    anesthetic and narcotic custom mixture   Intrathecal Once    levothyroxine  150 mcg Oral Daily    pregabalin  300 mg Oral BID    sodium chloride flush  10 mL Intravenous 2 times per day     Continuous Infusions:   propofol Stopped (11/01/20 0747)     PRN Meds:   bisacodyl, 10 mg, Daily PRN  sodium chloride flush, 10 mL, PRN  acetaminophen, 650 mg, Q4H PRN  albuterol, 2.5 mg, As Directed RT PRN  sodium chloride flush, 10 mL, PRN  ondansetron, 4 mg, Q8H PRN          VENT SETTINGS (Comprehensive) (if applicable):  Vent Information  $Ventilation: $Subsequent Day  Skin Assessment: Clean, dry, & intact  Equipment ID: [id number]  Equipment Changed: Expiratory Filter  Vent Type: Servo i  Vent Mode: PRVC  Vt Ordered: 480 mL  Rate Set: 12 bmp  Pressure Support: 8 cmH20  FiO2 : 40 %  SpO2: 95 %  SpO2/FiO2 ratio: 240  Sensitivity: 3  PEEP/CPAP: 5  I Time/ I Time %: 1.05 s  Humidification Source: Heated wire  Humidification Temp: 37  Humidification Temp Measured: 36.6  Circuit Condensation: Drained  Nitric Oxide/Epoprostenol In Use?: No  Mask Type: Full face mask  Mask Size: Small  Additional Respiratory  Assessments  Pulse: 84  Resp: 17  SpO2: 95 %  $End Tidal CO2: 38  pCO2 (TCOM, mmHg): 42 mmHg  Position: Semi-Mcclendon's  Humidification Source: Heated wire  Humidification Temp: 37  Circuit Condensation: Drained  Oral Care Completed?: Yes  Oral Care: Mouthwash, Lip moisturizer applied, Mouth swabbed, Mouth suctioned  Subglottic Suction Done?: Yes  Cuff Pressure (cm H2O): (MLT)    ABGs:     Laboratory findings:    Complete Blood Count:   Recent Labs     10/31/20  0417 11/01/20  0426 11/02/20  0436   WBC 8.6 8.9 10.7   HGB 9.6* 10.2* 9.8*   HCT 31.2* 33.0* 31.4*    259 260        Last 3 Blood Glucose:   Recent Labs     10/31/20  0417 11/01/20  0426 11/02/20  0436   GLUCOSE 113* 140* 147*        PT/INR:    Lab Results   Component Value Date    PROTIME 12.7 10/22/2020    INR 1.0 10/22/2020     PTT:    Lab Results   Component Value Date    APTT 57.1 10/27/2020       Comprehensive Metabolic Profile:   Recent Labs     10/31/20  0417 11/01/20  0426 11/02/20  0436   * 142 143   K 3.5* 3.8 3.3*   * 105 103   CO2 28 27 28   BUN 25* 25* 21   CREATININE 0.46* 0.48* 0.45*   GLUCOSE 113* 140* 147*   CALCIUM 9.1 8.8 8.8      Magnesium:   Lab Results   Component Value Date    MG 2.3 11/02/2020     Phosphorus:   Lab Results   Component Value Date    PHOS 2.6 11/02/2020     Ionized Calcium:   Lab Results   Component Value Date    CAION 1.17 10/24/2020      Troponin: No results for input(s): TROPONINI in the last 72 hours. Microbiology:    Cultures during this admission:     Blood cultures:                 [] None drawn      [x] Negative             []  Positive (Details:  )  Urine Culture:                   [] None drawn      [x] Negative             []  Positive (Details:  )  Sputum Culture:               [] None drawn       [] Negative             [x]  Positive (Details:  )   Endotracheal aspirate:     [] None drawn       [] Negative             []  Positive (Details:  )     Radiology/Imaging:     Chest Xray (11/2/2020):   CT HEAD WO CONTRAST   Final Result   No acute intracranial abnormality. MRI may be obtained if clinically   indicated. XR CHEST PORTABLE   Final Result   1. Endotracheal and enteric tubes remain in place. Interval right IJ   central venous catheter with distal tip at the mid SVC. 2.  Diffuse hazy airspace and interstitial opacities in the lungs are   slightly increased when compared to the radiograph performed earlier this   morning. XR CHEST PORTABLE   Final Result   1. Endotracheal tube remains in appropriate position. 2. Bilateral airspace disease again noted, although less prominent. XR CHEST PORTABLE   Final Result   Multifocal interstitial and consolidative infiltrates seen throughout the   lungs bilaterally, with worsening consolidation in the right lung base. XR CHEST PORTABLE   Final Result   1. Endotracheal tube terminates at the level of the alejandro and should be   pulled back approximately 2-3 cm.   2. Redemonstration of coarse, diffuse bilateral interstitial opacities with   slightly increased superimposed hazy airspace opacity, possibly edema or   worsening pneumonia. XR CHEST PORTABLE   Final Result   Coarse bilateral airspace opacities within the upper and lower lobes, not   significantly changed, suggesting a multi lobar pneumonia. XR CHEST PORTABLE   Final Result   Partial clearing of bilateral lung opacities. XR CHEST PORTABLE   Final Result   Increased bilateral lung opacities. XR CHEST PORTABLE   Final Result   Bilateral lung opacities likely pneumonia. Enteric tube with the tip within the proximal stomach. Consider slightly   advancing. XR ABDOMEN FOR NG/OG/NE TUBE PLACEMENT   Final Result   Life-support devices as above. Similar-appearing diffuse bilateral heterogeneous opacities and multifocal   consolidations. XR CHEST PORTABLE   Final Result   Life-support devices as above. Similar-appearing diffuse bilateral heterogeneous opacities and multifocal   consolidations.          XR CHEST PORTABLE   Final Result   No significant interval change in multifocal bilateral consolidation, most   concerning for pneumonia. XR CHEST PORTABLE   Final Result   No significant change in the multifocal airspace opacities worrisome for   multifocal pneumonia. ASSESSMENT:     Patient Active Problem List    Diagnosis Date Noted    Acute metabolic encephalopathy 53/50/7573     Priority: High     Class: Acute    Encephalopathy     Acute respiratory failure with hypoxia and hypercapnia (HCC)     Elevated troponin     Severe sepsis ([facility] [address] 10/22/2020    COPD exacerbation ([facility] [address] 10/22/2020    Sepsis due to pneumonia ([facility] [address] 08/16/2020    Lactic acidosis 08/16/2020    Septic shock ([facility] [address] 08/16/2020    Status post surgery 12/20/2018    Pneumonia of both lower lobes due to infectious organism 10/05/2018    Hypothyroidism 10/05/2018    Major depressive disorder 10/05/2018    Chronic midline low back pain without sciatica 10/05/2018    Iron deficiency anemia 10/05/2018           PLAN:     WEAN PER PROTOCOL:  [] No   [x] Yes  [] N/A    DISCONTINUE ANY LABS:   [x] No   [] Yes    ICU PROPHYLAXIS:  Stress ulcer:  [] PPI Agent  [x] [id number] [] Sucralfate  [] Other:  VTE:   [] Enoxaparin  [] Unfract. Heparin Subcut  [] EPC Cuffs    NUTRITION:  [] NPO [x] Tube Feeding (Specify: ) [] TPN  [] PO   HOME MEDICATIONS RECONCILED: [] No  [x] Yes    INSULIN DRIP:   [x] No   [] Yes    CONSULTATION NEEDED:  [x] No   [] Yes    FAMILY UPDATED:    [x] No   [] Yes    TRANSFER OUT OF ICU:   [x] No   [] Yes    ADDITIONAL PLAN:  CNS  Toxic metabolic encephalopathy. Resolved  No evidence of elective form discharges on LTME. LTM discontinued. Off of sedation  Patient is alert and following all simple commands. Continue to be intubated on PRVC mode 12/480/5/40. ABG 7.46/46/69/33    CVS  New onset LV systolic dysfunction with EF 25 to 30% likely Takotsubo cardiomyopathy  NICM, HFrEF, and minimal CAD on cath  Paroxysmal A. fib with RVR.   Currently in NSR  Left bundle branch block  Elevated troponin likely demand ischemia. Shock likely septic Vs cardiogenic. Resolved   Continue Lopressor 25 mg bid, amiodarone 200 mg bid, Aldactone 25 mg daily  Continue ProAmatine 10 mg tid  Will resume Iv Lasix 20 mg daily   Continue enoxaparin 70 mg twice daily for anticoagulation for possible LV thrombus   Patient will need LifeVest prior to discharge. Resp:  Acute hypoxic and hypercapnic respiratory failure from b/l pneumonia Vs pulmonary edema. Resolving   sputum cultures 10/25/20 grew Klebsiella Pneumoniae moderate growth  Continue to be intubated on PRVC mode 12/480/5/40. ABG 7.46/46/69/33  Weaned well all day yesterday   Continue IV Rocephin 2 g IV every 24H till 11/3/2020  Continue bronchodilators I.e DuoNeb and albuterol  Possible extubation today     GI:  Severe constipation  One episode of vomiting this morning  Tube feed on hold  Follow-up on abdominal x-ray to rule out ileus  Continue IV Pepcid for GI prophylaxis    ID:  Possible bilateral pneumonia  Finish azithromycin, on Rocephin 2 g IV 24H till 11/3  WBC count stable   Afebrile  Central and art line removed this morning  ID following along     Renal/fluid/electrolytes:  UOP 1.8 L in last 24-hour, total intake 2.2 L, net +8.8 L since admit  Resume Lasix today. Hypokalemia. Replace as needed. Strict I/Os    Endo:  Hypothyroidism   Continue Synthroid 150 mcg daily. Mary Vaughan MD.  Internal Medicine Resident PGY 1 [address]   11/2/2020, 7:48 AM      Attending Physician Statement  I have discussed the care of Anni Rae, including pertinent history and exam findings with the resident. I have reviewed the key elements of all parts of the encounter with the resident. I have seen and examined the patient with the resident. I agree with the assessment and plan and status of the problem list as documented.     I seen the patient during my round today, reviewed chart, labs seen overnight events noted.  She is afebrile overnight she did not have any hypotension blood pressure is better and is stable with episodes of hypertension sometime fluctuant blood pressure. She is much more alert and awake this morning when I saw her and she follows commands with all extremities although she goes back to sleep, she does have good cough on commands. Her sodium is better 143, urine output is 1800 and last 24 hours and she is on Aldactone. Her BUN is 21 creatinine 0.45 bicarbonate is 28, WBC count is 10.7 and hemoglobin stable at 9.8 platelet 023  Arterial blood gas 7.4 6/46/70/33 on ventilator setting this morning PRVC/12/480/5/40 percent  She is tolerating spontaneous breathing trial with CPAP/pressure support of 8/5 and 40% tidal volume is around 600-700 and respiratory rate is 16-20. We will extubate. Lasix 20 mg today and follow-up urine output and potassium. Replace potassium. Alert and awake and follows commands  She is on Dilaudid pump when she is on oxycodone 10 mg 3 times a day we will change oxycodone to as needed    Discussed with nursing staff, treatment and plan discussed. Discussed with respiratory therapist.    Total critical care time caring for this patient with life threatening, unstable organ failure, including direct patient contact, management of life support systems, review of data including imaging and labs, discussions with other team members and physicians at least 28  Min so far today, excluding procedures. Please note that this chart was generated using voice recognition Dragon dictation software. Although every effort was made to ensure the accuracy of this automated transcription, some errors in transcription may have occurred.      Oscar Jha MD  11/2/2020 12:17 PM

## 2020-11-02 NOTE — PLAN OF CARE
Problem: Skin Integrity:  Goal: Will show no infection signs and symptoms  Description: Will show no infection signs and symptoms  Outcome: Ongoing  Goal: Absence of new skin breakdown  Description: Absence of new skin breakdown  Outcome: Ongoing     Problem: Falls - Risk of:  Goal: Will remain free from falls  Description: Will remain free from falls  Outcome: Ongoing  Goal: Absence of physical injury  Description: Absence of physical injury  Outcome: Ongoing     Problem: Pain:  Goal: Pain level will decrease  Description: Pain level will decrease  Outcome: Ongoing  Goal: Control of acute pain  Description: Control of acute pain  Outcome: Ongoing  Goal: Control of chronic pain  Description: Control of chronic pain  Outcome: Ongoing     Problem: OXYGENATION/RESPIRATORY FUNCTION  Goal: Patient will maintain patent airway  11/2/2020 0644 by Lilly Cannon RN  Outcome: Ongoing  11/1/2020 2018 by Max Bello RCP  Outcome: Ongoing  Goal: Patient will achieve/maintain normal respiratory rate/effort  Description: Respiratory rate and effort will be within normal limits for the patient  11/2/2020 0644 by Lilly Cannon RN  Outcome: Ongoing  11/1/2020 2018 by Max Bello RCP  Outcome: Ongoing     Problem: MECHANICAL VENTILATION  Goal: Patient will maintain patent airway  11/2/2020 0644 by Lilly Cannon RN  Outcome: Ongoing  11/1/2020 2018 by Max Bello RCP  Outcome: Ongoing  Goal: Oral health is maintained or improved  11/2/2020 0644 by Lilly Cannon RN  Outcome: Ongoing  11/1/2020 2018 by Max Bello RCP  Outcome: Ongoing  Goal: ET tube will be managed safely  11/2/2020 0644 by Lilly Cannon RN  Outcome: Ongoing  11/1/2020 2018 by Max Bello RCP  Outcome: Ongoing  Goal: Ability to express needs and understand communication  11/2/2020 0644 by Lilly Cannon RN  Outcome: Ongoing  11/1/2020 2018 by Max Bello RCP  Outcome: Ongoing  Goal: Mobility/activity is maintained at optimum level for patient  11/2/2020 5400 by Lilly Cannon RN  Outcome: Ongoing  11/1/2020 2018 by Max Bello RCP  Outcome: Ongoing     Problem: SKIN INTEGRITY  Goal: Skin integrity is maintained or improved  11/2/2020 0644 by Lilly Cannon RN  Outcome: Ongoing  11/1/2020 2018 by Max Bello RCP  Outcome: Ongoing     Problem: Nutrition  Goal: Optimal nutrition therapy  Description: Nutrition Problem #1: Inadequate oral intake  Intervention: Food and/or Nutrient Delivery: Start nutrition as able; if TF, suggest Standard without Fiber goal 65 mL/hr to provide 1872 kcal and 87 g pro/day.   Nutritional Goals: meet % of estimated nutrient needs     Outcome: Ongoing     Problem: Confusion - Acute:  Goal: Absence of continued neurological deterioration signs and symptoms  Description: Absence of continued neurological deterioration signs and symptoms  Outcome: Ongoing  Goal: Mental status will be restored to baseline  Description: Mental status will be restored to baseline  Outcome: Ongoing     Problem: Discharge Planning:  Goal: Ability to perform activities of daily living will improve  Description: Ability to perform activities of daily living will improve  Outcome: Ongoing  Goal: Participates in care planning  Description: Participates in care planning  Outcome: Ongoing     Problem: Injury - Risk of, Physical Injury:  Goal: Will remain free from falls  Description: Will remain free from falls  Outcome: Ongoing  Goal: Absence of physical injury  Description: Absence of physical injury  Outcome: Ongoing     Problem: Mood - Altered:  Goal: Mood stable  Description: Mood stable  Outcome: Ongoing  Goal: Absence of abusive behavior  Description: Absence of abusive behavior  Outcome: Ongoing  Goal: Verbalizations of feeling emotionally comfortable while being cared for will increase  Description: Verbalizations of feeling emotionally comfortable while being cared for will increase  Outcome: Ongoing     Problem: Psychomotor Activity - Altered:  Goal: Absence of psychomotor disturbance signs and symptoms  Description: Absence of psychomotor disturbance signs and symptoms  Outcome: Ongoing     Problem: Sensory Perception - Impaired:  Goal: Demonstrations of improved sensory functioning will increase  Description: Demonstrations of improved sensory functioning will increase  Outcome: Ongoing  Goal: Decrease in sensory misperception frequency  Description: Decrease in sensory misperception frequency  Outcome: Ongoing  Goal: Able to refrain from responding to false sensory perceptions  Description: Able to refrain from responding to false sensory perceptions  Outcome: Ongoing  Goal: Demonstrates accurate environmental perceptions  Description: Demonstrates accurate environmental perceptions  Outcome: Ongoing  Goal: Able to distinguish between reality-based and nonreality-based thinking  Description: Able to distinguish between reality-based and nonreality-based thinking  Outcome: Ongoing  Goal: Able to interrupt nonreality-based thinking  Description: Able to interrupt nonreality-based thinking  Outcome: Ongoing     Problem: Sleep Pattern Disturbance:  Goal: Appears well-rested  Description: Appears well-rested  Outcome: Ongoing     Problem: Restraint Use - Nonviolent/Non-Self-Destructive Behavior:  Goal: Absence of restraint indications  Description: Absence of restraint indications  Outcome: Completed  Goal: Absence of restraint-related injury  Description: Absence of restraint-related injury  Outcome: Completed

## 2020-11-02 NOTE — PROGRESS NOTES
[address]   Occupational Therapy Not Seen Note    DATE: 2020  Name: Jose Armando Moraes  : 1945  MRN: [id number]    Patient not available for Occupational Therapy due to:    [x] Other : Pt remains intubated, not currently appropriate for OT evaluation. Will check back as able.      Next Scheduled Treatment: Will check back as able     Yana Kruse OTR/L

## 2020-11-02 NOTE — PROGRESS NOTES
Physical Therapy  DATE: 2020    NAME: Karissa Blake  MRN: [id number]   : 1945    Patient not seen this date for Physical Therapy due to:  [] Blood transfusion in progress  [] Hemodialysis  []  Patient Declined  [] Spine Precautions   [] Strict Bedrest  [] Surgery/ Procedure  [] Testing      [x] Other Intubated. Probable extubation today. Check 11/3       [] PT being discontinued at this time. Patient independent. No further needs. [] PT being discontinued at this time as the patient has been transferred to palliative care. No further needs.     Haja Henriquez, PT

## 2020-11-02 NOTE — PROGRESS NOTES
[facility] - Progress Note    Today's Date and Time: 11/2/2020, 9:50 AM    Impression :   · Shock :septic Vs cardiogenic: resolved. · Bandemia  · CHF with elevated ProBNP  · Pulmonary edema  · Superimposed pneumonia; sputum cultures 10/25/20 grew Klebsiella Pneumoniae moderate growth  · COPD  · Elevated Troponins  · Hypothyroidism    Recommendations:     · Rocephin 2 gm IV q 24 hr (start date 10/23). Stop 11-3-20  · D/C Zithromax 500 mg IV q 24 hr (start date 10/23. Stop date 10-27-20)    Medical Decision Making/Summary/Discussion:   · Patient with CHF  · Rapid deterioration with acute hypoxia and hypercarbia requiring intubation  · CXR with rapid fluid shifts suggesting pulmonary edema. Very high ProBNP  · Can not exclude associated pneumonia at this stage. Pro calcitonin elevated    Infection Control Recommendations   · [place] Precautions    Antimicrobial Stewardship Recommendations   · Simplification of therapy  · Targeted therapy    Coordination of Outpatient Care:   · Estimated Length of IV antimicrobials:TBD  · Patient will need Midline Catheter Insertion: No  · Patient will need PICC line Insertion:Yes  · Patient will need: Home IV , [place],  SNF,  LTAC: TBD  · Patient will need outpatient wound care:No    Chief complaint/reason for consultation:   · Sepsis secondary to pneumonia    History of Present Illness:   Chriss Escobar is a 77y.o.-year-old  female who was initially admitted on 10/22/2020. Patient seen at the request of Dr. Iona Cole:  History was obtained from chart review and unobtainable from patient due to mental status. She has a history of hypothyroidism, COPD, iron deficiency anemia, presented to [place] [id number] emergency department on 10/22 for difficulties breathing. Found to have pneumonia, and started on the sepsis protocol. At [facility]. V's, she is unable to provide much history as she is somnolent, but does follow commands. >11.3>10.9>10.6>10.4-->9.6>9.8  Platelet: 682 >288 >362>000>599>876>352-->793>760    Lactic acid 6.2 > 2.9 > 2.0>1.7>5>0.53    Cultures:  Urine:  ·   Blood:  · 10/22/20: No growth   Sputum :  · 10/25/20: Klebsiella pneumoniae moderate growth. Wound:     CSF         Discussed with patient, RN,     I have personally reviewed the past medical history, past surgical history, medications, social history, and family history, and I have updated the database accordingly.   Past Medical History:     Past Medical History:   Diagnosis Date    COPD (chronic obstructive pulmonary disease) ([facility] [address]     Depression     GERD (gastroesophageal reflux disease)     Osteoporosis        Past Surgical  History:     Past Surgical History:   Procedure Laterality Date    BACK SURGERY      BREAST SURGERY      CARPAL TUNNEL RELEASE      FRACTURE SURGERY Left 12/20/2018    ORIF wrist    HYSTERECTOMY      JOINT REPLACEMENT      right knee    JOINT REPLACEMENT      WRIST FRACTURE SURGERY Left 12/20/2018    WRIST OPEN REDUCTION INTERNAL FIXATION-DISTAL RADIUS performed by Merly Olivares MD at [facility]       Medications:      metoprolol tartrate  25 mg Oral BID    sennosides-docusate sodium  2 tablet Oral Daily    amiodarone  200 mg Oral BID    furosemide  20 mg Intravenous Daily    oxyCODONE  10 mg Oral Q8H    midodrine  10 mg Oral TID WC    spironolactone  25 mg Oral Daily    polyethylene glycol  17 g Oral Daily    cefTRIAXone (ROCEPHIN) IV  2 g Intravenous Q24H    enoxaparin  1 mg/kg Subcutaneous BID    sodium chloride flush  10 mL Intravenous 2 times per day    aspirin  81 mg Oral Daily    ipratropium-albuterol  1 ampule Inhalation Q6H    famotidine (PEPCID) injection  20 mg Intravenous BID    anesthetic and narcotic custom mixture   Intrathecal Once    levothyroxine  150 mcg Oral Daily    pregabalin  300 mg Oral BID    sodium chloride flush  10 mL Intravenous 2 times per day       Social History:     Social History     Socioeconomic History    Marital status:      Spouse name: Not on file    Number of children: Not on file    Years of education: Not on file    Highest education level: Not on file   Occupational History    Not on file   Social Needs    Financial resource strain: Not on file    Food insecurity     Worry: Not on file     Inability: Not on file    Transportation needs     Medical: Not on file     Non-medical: Not on file   Tobacco Use    Smoking status: Former Smoker     Last attempt to quit: 1976     Years since quittin.0    Smokeless tobacco: Never Used   Substance and Sexual Activity    Alcohol use: No    Drug use: No    Sexual activity: Not on file   Lifestyle    Physical activity     Days per week: Not on file     Minutes per session: Not on file    Stress: Not on file   Relationships    Social connections     Talks on phone: Not on file     Gets together: Not on file     Attends Yazidi service: Not on file     Active member of club or organization: Not on file     Attends meetings of clubs or organizations: Not on file     Relationship status: Not on file    Intimate partner violence     Fear of current or ex partner: Not on file     Emotionally abused: Not on file     Physically abused: Not on file     Forced sexual activity: Not on file   Other Topics Concern    Not on file   Social History Narrative    Not on file       Family History:   No family history on file. Allergies:   Patient has no known allergies.      Review of Systems:   Review of Systems   Unable to perform ROS: Intubated          Physical Examination :     Patient Vitals for the past 8 hrs:   BP Temp Temp src Pulse Resp SpO2   20 0900 (!) 150/77 -- -- 78 14 96 %   20 0800 (!) 140/54 -- -- 82 13 93 %   20 0755 -- -- -- 83 14 95 %   20 0754 -- -- -- -- 14 94 %   20 0700 (!) 156/92 -- -- 84 17 95 %   20 0600 125/62 -- -- 83 15 95 %   20 0500 -- -- -- 94 20 96 %   11/02/20 0400 (!) 181/74 99 °F (37.2 °C) Oral 88 17 96 %   11/02/20 0338 -- -- -- 79 18 96 %   11/02/20 0300 133/60 -- -- 75 14 96 %   11/02/20 0200 (!) 142/69 -- -- 79 16 95 %     Physical Exam  Constitutional:       General: She is not in acute distress. Appearance: Normal appearance. She is obese. She is ill-appearing. HENT:      Head: Normocephalic and atraumatic. Nose: Nose normal. No congestion. Mouth/Throat:      Mouth: Mucous membranes are moist.   Eyes:      General: No scleral icterus. Conjunctiva/sclera: Conjunctivae normal.   Neck:      Musculoskeletal: Neck supple. No neck rigidity. Cardiovascular:      Rate and Rhythm: Normal rate and regular rhythm. Heart sounds: Normal heart sounds. No murmur. Pulmonary:      Effort: No respiratory distress. Breath sounds: Rales present. Abdominal:      General: There is no distension. Palpations: Abdomen is soft. Tenderness: There is no abdominal tenderness. Genitourinary:     Comments: Urine maral  R fem line in good condition  Musculoskeletal:         General: No swelling or tenderness. Skin:     Coloration: Skin is not jaundiced or pale. Neurological:      Comments: Sedated    Psychiatric:      Comments: Calm on the vent sedated          Medical Decision Making -Laboratory:   I have independently reviewed/ordered the following labs:    CBC with Differential:   Recent Labs     11/01/20  0426 11/02/20  0436   WBC 8.9 10.7   HGB 10.2* 9.8*   HCT 33.0* 31.4*    260   LYMPHOPCT 10* 11*   MONOPCT 5 7     BMP:   Recent Labs     11/01/20  0426 11/01/20  1303 11/02/20  0436     --  143   K 3.8  --  3.3*     --  103   CO2 27  --  28   BUN 25*  --  21   CREATININE 0.48*  --  0.45*   MG  --  2.3 2.3     Hepatic Function Panel:   No results for input(s): PROT, LABALBU, BILIDIR, IBILI, BILITOT, ALKPHOS, ALT, AST in the last 72 hours. No results for input(s): RPR in the last 72 hours.   No results for input(s): HIV in the last 72 hours. No results for input(s): BC in the last 72 hours. Lab Results   Component Value Date    MUCUS NOT REPORTED 10/30/2020    RBC 3.19 11/02/2020    TRICHOMONAS NOT REPORTED 10/30/2020    WBC 10.7 11/02/2020    YEAST NOT REPORTED 10/30/2020    TURBIDITY TURBID 10/30/2020     Lab Results   Component Value Date    CREATININE 0.45 11/02/2020    GLUCOSE 147 11/02/2020       Medical Decision Making-Imaging:   10-30-20:      10 -28 -20:      10-27-20        10/23/20:            Dolan Goltz, MS4    ATTESTATION:    I have discussed the case, including pertinent history and exam findings with the residents and students. I have seen and examined the patient and the key elements of the encounter have been performed by me. I was present when the student obtained his information or examined the patient. I have reviewed the laboratory data, other diagnostic studies and discussed them with the residents. I have updated the medical record where necessary. I agree with the assessment, plan and orders as documented by the resident/ student.     Jer Morrison MD.

## 2020-11-03 LAB
ABSOLUTE EOS #: 0.09 K/UL (ref 0–0.44)
ABSOLUTE IMMATURE GRANULOCYTE: 0.08 K/UL (ref 0–0.3)
ABSOLUTE LYMPH #: 1.06 K/UL (ref 1.1–3.7)
ABSOLUTE MONO #: 0.68 K/UL (ref 0.1–1.2)
ANION GAP SERPL CALCULATED.3IONS-SCNC: 11 MMOL/L (ref 9–17)
BASOPHILS # BLD: 0 % (ref 0–2)
BASOPHILS ABSOLUTE: 0.03 K/UL (ref 0–0.2)
BUN BLDV-MCNC: 25 MG/DL (ref 8–23)
BUN/CREAT BLD: ABNORMAL (ref 9–20)
CALCIUM SERPL-MCNC: 9.1 MG/DL (ref 8.6–10.4)
CHLORIDE BLD-SCNC: 101 MMOL/L (ref 98–107)
CO2: 27 MMOL/L (ref 20–31)
CREAT SERPL-MCNC: 0.36 MG/DL (ref 0.5–0.9)
DIFFERENTIAL TYPE: ABNORMAL
EOSINOPHILS RELATIVE PERCENT: 1 % (ref 1–4)
GFR AFRICAN AMERICAN: >60 ML/MIN
GFR NON-AFRICAN AMERICAN: >60 ML/MIN
GFR SERPL CREATININE-BSD FRML MDRD: ABNORMAL ML/MIN/{1.73_M2}
GFR SERPL CREATININE-BSD FRML MDRD: ABNORMAL ML/MIN/{1.73_M2}
GLUCOSE BLD-MCNC: 137 MG/DL (ref 65–105)
GLUCOSE BLD-MCNC: 164 MG/DL (ref 65–105)
GLUCOSE BLD-MCNC: 74 MG/DL (ref 65–105)
GLUCOSE BLD-MCNC: 88 MG/DL (ref 70–99)
GLUCOSE BLD-MCNC: 94 MG/DL (ref 65–105)
HCT VFR BLD CALC: 33.7 % (ref 36.3–47.1)
HEMOGLOBIN: 10.3 G/DL (ref 11.9–15.1)
IMMATURE GRANULOCYTES: 1 %
LYMPHOCYTES # BLD: 11 % (ref 24–43)
MAGNESIUM: 2.4 MG/DL (ref 1.6–2.6)
MCH RBC QN AUTO: 30.3 PG (ref 25.2–33.5)
MCHC RBC AUTO-ENTMCNC: 30.6 G/DL (ref 28.4–34.8)
MCV RBC AUTO: 99.1 FL (ref 82.6–102.9)
MONOCYTES # BLD: 7 % (ref 3–12)
NRBC AUTOMATED: 0 PER 100 WBC
PDW BLD-RTO: 14.1 % (ref 11.8–14.4)
PHOSPHORUS: 2.9 MG/DL (ref 2.6–4.5)
PLATELET # BLD: 297 K/UL (ref 138–453)
PLATELET ESTIMATE: ABNORMAL
PMV BLD AUTO: 10.6 FL (ref 8.1–13.5)
POTASSIUM SERPL-SCNC: 3.5 MMOL/L (ref 3.7–5.3)
RBC # BLD: 3.4 M/UL (ref 3.95–5.11)
RBC # BLD: ABNORMAL 10*6/UL
SEG NEUTROPHILS: 80 % (ref 36–65)
SEGMENTED NEUTROPHILS ABSOLUTE COUNT: 7.77 K/UL (ref 1.5–8.1)
SODIUM BLD-SCNC: 139 MMOL/L (ref 135–144)
WBC # BLD: 9.7 K/UL (ref 3.5–11.3)
WBC # BLD: ABNORMAL 10*3/UL

## 2020-11-03 PROCEDURE — 99291 CRITICAL CARE FIRST HOUR: CPT | Performed by: INTERNAL MEDICINE

## 2020-11-03 PROCEDURE — 83735 ASSAY OF MAGNESIUM: CPT

## 2020-11-03 PROCEDURE — 97530 THERAPEUTIC ACTIVITIES: CPT

## 2020-11-03 PROCEDURE — 2580000003 HC RX 258: Performed by: STUDENT IN AN ORGANIZED HEALTH CARE EDUCATION/TRAINING PROGRAM

## 2020-11-03 PROCEDURE — 84100 ASSAY OF PHOSPHORUS: CPT

## 2020-11-03 PROCEDURE — 6360000002 HC RX W HCPCS: Performed by: STUDENT IN AN ORGANIZED HEALTH CARE EDUCATION/TRAINING PROGRAM

## 2020-11-03 PROCEDURE — 6370000000 HC RX 637 (ALT 250 FOR IP): Performed by: STUDENT IN AN ORGANIZED HEALTH CARE EDUCATION/TRAINING PROGRAM

## 2020-11-03 PROCEDURE — 82947 ASSAY GLUCOSE BLOOD QUANT: CPT

## 2020-11-03 PROCEDURE — 51798 US URINE CAPACITY MEASURE: CPT

## 2020-11-03 PROCEDURE — 85025 COMPLETE CBC W/AUTO DIFF WBC: CPT

## 2020-11-03 PROCEDURE — 97166 OT EVAL MOD COMPLEX 45 MIN: CPT

## 2020-11-03 PROCEDURE — 97535 SELF CARE MNGMENT TRAINING: CPT

## 2020-11-03 PROCEDURE — 1200000000 HC SEMI PRIVATE

## 2020-11-03 PROCEDURE — 2500000003 HC RX 250 WO HCPCS: Performed by: STUDENT IN AN ORGANIZED HEALTH CARE EDUCATION/TRAINING PROGRAM

## 2020-11-03 PROCEDURE — 97162 PT EVAL MOD COMPLEX 30 MIN: CPT

## 2020-11-03 PROCEDURE — 94640 AIRWAY INHALATION TREATMENT: CPT

## 2020-11-03 PROCEDURE — 80048 BASIC METABOLIC PNL TOTAL CA: CPT

## 2020-11-03 PROCEDURE — 6370000000 HC RX 637 (ALT 250 FOR IP): Performed by: INTERNAL MEDICINE

## 2020-11-03 PROCEDURE — 92610 EVALUATE SWALLOWING FUNCTION: CPT

## 2020-11-03 PROCEDURE — 99233 SBSQ HOSP IP/OBS HIGH 50: CPT | Performed by: INTERNAL MEDICINE

## 2020-11-03 PROCEDURE — 36415 COLL VENOUS BLD VENIPUNCTURE: CPT

## 2020-11-03 RX ORDER — IPRATROPIUM BROMIDE AND ALBUTEROL SULFATE 2.5; .5 MG/3ML; MG/3ML
1 SOLUTION RESPIRATORY (INHALATION) 4 TIMES DAILY
Status: DISCONTINUED | OUTPATIENT
Start: 2020-11-03 | End: 2020-11-06 | Stop reason: HOSPADM

## 2020-11-03 RX ADMIN — IPRATROPIUM BROMIDE AND ALBUTEROL SULFATE 1 AMPULE: .5; 3 SOLUTION RESPIRATORY (INHALATION) at 11:41

## 2020-11-03 RX ADMIN — PREGABALIN 300 MG: 100 CAPSULE ORAL at 21:26

## 2020-11-03 RX ADMIN — POLYETHYLENE GLYCOL 3350 17 G: 17 POWDER, FOR SOLUTION ORAL at 08:20

## 2020-11-03 RX ADMIN — LEVOTHYROXINE SODIUM 150 MCG: 75 TABLET ORAL at 05:21

## 2020-11-03 RX ADMIN — AMIODARONE HYDROCHLORIDE 200 MG: 200 TABLET ORAL at 21:26

## 2020-11-03 RX ADMIN — SODIUM CHLORIDE, PRESERVATIVE FREE 10 ML: 5 INJECTION INTRAVENOUS at 09:00

## 2020-11-03 RX ADMIN — STANDARDIZED SENNA CONCENTRATE AND DOCUSATE SODIUM 2 TABLET: 8.6; 5 TABLET ORAL at 08:18

## 2020-11-03 RX ADMIN — FAMOTIDINE 20 MG: 10 INJECTION INTRAVENOUS at 21:27

## 2020-11-03 RX ADMIN — PREGABALIN 300 MG: 100 CAPSULE ORAL at 08:17

## 2020-11-03 RX ADMIN — IPRATROPIUM BROMIDE AND ALBUTEROL SULFATE 1 AMPULE: .5; 3 SOLUTION RESPIRATORY (INHALATION) at 07:50

## 2020-11-03 RX ADMIN — FAMOTIDINE 20 MG: 10 INJECTION INTRAVENOUS at 08:18

## 2020-11-03 RX ADMIN — CEFTRIAXONE SODIUM 2 G: 2 INJECTION, POWDER, FOR SOLUTION INTRAMUSCULAR; INTRAVENOUS at 12:14

## 2020-11-03 RX ADMIN — FUROSEMIDE 20 MG: 10 INJECTION, SOLUTION INTRAMUSCULAR; INTRAVENOUS at 08:19

## 2020-11-03 RX ADMIN — IPRATROPIUM BROMIDE AND ALBUTEROL SULFATE 1 AMPULE: .5; 3 SOLUTION RESPIRATORY (INHALATION) at 15:25

## 2020-11-03 RX ADMIN — METOPROLOL TARTRATE 25 MG: 25 TABLET ORAL at 21:27

## 2020-11-03 RX ADMIN — ENOXAPARIN SODIUM 70 MG: 80 INJECTION SUBCUTANEOUS at 21:27

## 2020-11-03 RX ADMIN — ENOXAPARIN SODIUM 70 MG: 80 INJECTION SUBCUTANEOUS at 08:19

## 2020-11-03 RX ADMIN — AMIODARONE HYDROCHLORIDE 200 MG: 200 TABLET ORAL at 08:18

## 2020-11-03 RX ADMIN — METOPROLOL TARTRATE 25 MG: 25 TABLET ORAL at 08:18

## 2020-11-03 RX ADMIN — IPRATROPIUM BROMIDE AND ALBUTEROL SULFATE 1 AMPULE: .5; 3 SOLUTION RESPIRATORY (INHALATION) at 20:09

## 2020-11-03 RX ADMIN — ASPIRIN 81 MG: 81 TABLET, CHEWABLE ORAL at 08:18

## 2020-11-03 RX ADMIN — SPIRONOLACTONE 25 MG: 25 TABLET ORAL at 08:18

## 2020-11-03 ASSESSMENT — PAIN SCALES - GENERAL
PAINLEVEL_OUTOF10: 0
PAINLEVEL_OUTOF10: 0

## 2020-11-03 NOTE — PROGRESS NOTES
Critical Care Team - Daily Progress Note      Date and time: 11/3/2020 7:07 AM  Patient's name:  Jer Minor Record Number: [id number]  Patient's account/billing number: [de-identified]  Patient's YOB: 1945  Age: 77 y.o. Date of Admission: 10/22/2020  6:58 PM  Length of stay during current admission: 12  Primary Care Physician: Jeremie Contreras MD  ICU Attending Physician: Dr. Tucker Alonzo Status: Full Code    Reason for ICU admission: Pneumonia vs CHF    SUBJECTIVE:     OVERNIGHT EVENTS:   No issues overnight noted. Patient got extubated yesterday without complication. She is on 5 L oxygen saturating 94%. She did past but swallow study yesterday, started on clear liquid diet. Patient denied any symptoms at the moment. Urine output 1.6 L in last 24 hours, net +7 L since admit. Stable enough to be transferred out of ICU. Feeding Diet: DIET CLEAR LIQUID;  Fluids: Dorita Molina  Family: Up to date  Analgesic: Dilaudid pump, as needed oxycodone  Sedation: Not applicable  Thrombo-prophylaxis: [x] Enoxaparin, [] Unfract. Heparin Subcutaneously, [] EPC Cuffs  Mobility: Not yet  Heads up: 90 degree   Ulcer prophylaxis: [] PPI Agent, [x] [id number], [] Sucralfate, [] Other:  Glycemic control:   Spontaneous breathing trial: N/A  Bowel regimen/urine output: 1 bowel movement, 1.6 L urine output, net +7.6 L. Indwelling catheter/lines: ureteral catheter   De-escalation: Ceftriaxone.   Transfer out of ICU      AWAKE & FOLLOWING COMMANDS:  [] No   [x] Yes    CURRENT VENTILATION STATUS:     [] Ventilator  [] BIPAP  [x] Nasal Cannula [] Room Air      SEDATION:  RAAS Score:  [] Propofol gtt  [] Versed gtt  [] Ativan gtt   [x] No Sedation    PARALYZED:  [x] No    [] Yes    DIARRHEA:                [x] No                [] Yes  (C. Difficile status: [] positive                                                                                                                       [] negative nontender, nondistended, no masses or organomegaly  Neurological - Spontaneously opens eyes and looks around room. Will wiggle toes for me, squeezes RN's hand.   Extremities - no pedal edema noted  Skin - No rashes over exposed skin      MEDICATIONS:    Scheduled Meds:   ipratropium-albuterol  1 ampule Inhalation 4x daily    metoprolol tartrate  25 mg Oral BID    sennosides-docusate sodium  2 tablet Oral Daily    amiodarone  200 mg Oral BID    furosemide  20 mg Intravenous Daily    midodrine  10 mg Oral TID WC    spironolactone  25 mg Oral Daily    polyethylene glycol  17 g Oral Daily    cefTRIAXone (ROCEPHIN) IV  2 g Intravenous Q24H    enoxaparin  1 mg/kg Subcutaneous BID    sodium chloride flush  10 mL Intravenous 2 times per day    aspirin  81 mg Oral Daily    famotidine (PEPCID) injection  20 mg Intravenous BID    anesthetic and narcotic custom mixture   Intrathecal Once    levothyroxine  150 mcg Oral Daily    pregabalin  300 mg Oral BID    sodium chloride flush  10 mL Intravenous 2 times per day     Continuous Infusions:   propofol Stopped (11/01/20 0747)     PRN Meds:   oxyCODONE, 5 mg, Q6H PRN  bisacodyl, 10 mg, Daily PRN  sodium chloride flush, 10 mL, PRN  acetaminophen, 650 mg, Q4H PRN  albuterol, 2.5 mg, As Directed RT PRN  sodium chloride flush, 10 mL, PRN  ondansetron, 4 mg, Q8H PRN          VENT SETTINGS (Comprehensive) (if applicable):  Vent Information  $Ventilation: $Subsequent Day  Skin Assessment: Clean, dry, & intact  Equipment ID: [id number]  Equipment Changed: Expiratory Filter  Vent Type: Servo i  Vent Mode: CPAP  Vt Ordered: 480 mL  Rate Set: 12 bmp  Pressure Support: 8 cmH20  FiO2 : 40 %  SpO2: 95 %  SpO2/FiO2 ratio: 237.5  Sensitivity: 2  PEEP/CPAP: 5  I Time/ I Time %: 1.05 s  Humidification Source: Heated wire  Humidification Temp: 37  Humidification Temp Measured: 36.6  Circuit Condensation: Drained  Nitric Oxide/Epoprostenol In Use?: No  Mask Type: Full face mask  Mask Size: Small  Additional Respiratory  Assessments  Pulse: 64  Resp: 13  SpO2: 95 %  $End Tidal CO2: 38  pCO2 (TCOM, mmHg): 42 mmHg  Position: Semi-Mcclendon's  Humidification Source: Heated wire  Humidification Temp: 37  Circuit Condensation: Drained  Oral Care Completed?: Yes  Oral Care: Teeth brushed, Mouthwash  Subglottic Suction Done?: Yes  Cuff Pressure (cm H2O): (MLT)    ABGs:     Laboratory findings:    Complete Blood Count:   Recent Labs     11/01/20 0426 11/02/20 0436 11/03/20 0620   WBC 8.9 10.7 9.7   HGB 10.2* 9.8* 10.3*   HCT 33.0* 31.4* 33.7*    260 297        Last 3 Blood Glucose:   Recent Labs     11/01/20 0426 11/02/20 0436 11/03/20 0620   GLUCOSE 140* 147* 88        PT/INR:    Lab Results   Component Value Date    PROTIME 12.7 10/22/2020    INR 1.0 10/22/2020     PTT:    Lab Results   Component Value Date    APTT 57.1 10/27/2020       Comprehensive Metabolic Profile:   Recent Labs     11/01/20 0426 11/02/20 0436 11/03/20 0620    143 139   K 3.8 3.3* 3.5*    103 101   CO2 27 28 27   BUN 25* 21 25*   CREATININE 0.48* 0.45* 0.36*   GLUCOSE 140* 147* 88   CALCIUM 8.8 8.8 9.1      Magnesium:   Lab Results   Component Value Date    MG 2.4 11/03/2020     Phosphorus:   Lab Results   Component Value Date    PHOS 2.9 11/03/2020     Ionized Calcium:   Lab Results   Component Value Date    CAION 1.17 10/24/2020     Microbiology:  Cultures during this admission:     Blood cultures:                 [] None drawn      [x] Negative             []  Positive (Details:  )  Urine Culture:                   [] None drawn      [x] Negative             []  Positive (Details:  )  Sputum Culture:               [] None drawn       [] Negative             [x]  Positive (Details:  )   Endotracheal aspirate:     [] None drawn       [] Negative             []  Positive (Details:  )     Radiology/Imaging:     Chest Xray (11/3/2020):   XR ABDOMEN (KUB) (SINGLE AP VIEW)   Final Result   Gaseous distention of the colon suggesting colonic ileus. Distal large bowel   obstruction not excluded. CT HEAD WO CONTRAST   Final Result   No acute intracranial abnormality. MRI may be obtained if clinically   indicated. XR CHEST PORTABLE   Final Result   1. Endotracheal and enteric tubes remain in place. Interval right IJ   central venous catheter with distal tip at the mid SVC. 2.  Diffuse hazy airspace and interstitial opacities in the lungs are   slightly increased when compared to the radiograph performed earlier this   morning. XR CHEST PORTABLE   Final Result   1. Endotracheal tube remains in appropriate position. 2. Bilateral airspace disease again noted, although less prominent. XR CHEST PORTABLE   Final Result   Multifocal interstitial and consolidative infiltrates seen throughout the   lungs bilaterally, with worsening consolidation in the right lung base. XR CHEST PORTABLE   Final Result   1. Endotracheal tube terminates at the level of the alejandro and should be   pulled back approximately 2-3 cm.   2. Redemonstration of coarse, diffuse bilateral interstitial opacities with   slightly increased superimposed hazy airspace opacity, possibly edema or   worsening pneumonia. XR CHEST PORTABLE   Final Result   Coarse bilateral airspace opacities within the upper and lower lobes, not   significantly changed, suggesting a multi lobar pneumonia. XR CHEST PORTABLE   Final Result   Partial clearing of bilateral lung opacities. XR CHEST PORTABLE   Final Result   Increased bilateral lung opacities. XR CHEST PORTABLE   Final Result   Bilateral lung opacities likely pneumonia. Enteric tube with the tip within the proximal stomach. Consider slightly   advancing. XR ABDOMEN FOR NG/OG/NE TUBE PLACEMENT   Final Result   Life-support devices as above.       Similar-appearing diffuse bilateral heterogeneous opacities and multifocal consolidations. XR CHEST PORTABLE   Final Result   Life-support devices as above. Similar-appearing diffuse bilateral heterogeneous opacities and multifocal   consolidations. XR CHEST PORTABLE   Final Result   No significant interval change in multifocal bilateral consolidation, most   concerning for pneumonia. XR CHEST PORTABLE   Final Result   No significant change in the multifocal airspace opacities worrisome for   multifocal pneumonia. ASSESSMENT:     Patient Active Problem List    Diagnosis Date Noted    Acute metabolic encephalopathy 55/33/9598     Priority: High     Class: Acute    Encephalopathy     Acute respiratory failure with hypoxia and hypercapnia (HCC)     Elevated troponin     Severe sepsis ([address] 10/22/2020    COPD exacerbation ([address] 10/22/2020    Sepsis due to pneumonia ([address] 08/16/2020    Lactic acidosis 08/16/2020    Septic shock ([facility] [address] 08/16/2020    Status post surgery 12/20/2018    Pneumonia of both lower lobes due to infectious organism 10/05/2018    Hypothyroidism 10/05/2018    Major depressive disorder 10/05/2018    Chronic midline low back pain without sciatica 10/05/2018    Iron deficiency anemia 10/05/2018     PLAN:   CNS  Toxic metabolic encephalopathy. Resolved  No evidence of elective form discharges on LTME. LTM discontinued. Patient is alert and oriented and following commands     CVS  New onset LV systolic dysfunction with EF 25 to 30% likely Takotsubo cardiomyopathy  NICM, HFrEF, and minimal CAD on cath  Paroxysmal A. fib with RVR. Currently in NSR  Left bundle branch block  Elevated troponin likely demand ischemia. Shock likely septic Vs cardiogenic.  Resolved   Continue Lopressor 25 mg bid, amiodarone 200 mg bid, Aldactone 25 mg daily  Continue ProAmatine 10 mg tid  Continue Iv Lasix 20 mg daily   Continue enoxaparin 70 mg twice daily for anticoagulation for possible LV thrombus   Patient will need LifeVest prior to

## 2020-11-03 NOTE — PROGRESS NOTES
[facility] - Progress Note    Today's Date and Time: 11/3/2020, 8:19 AM    Impression :   · Shock :septic Vs cardiogenic: resolved. · Bandemia  · CHF with elevated ProBNP  · Pulmonary edema  · Superimposed pneumonia; sputum cultures 10/25/20 grew Klebsiella Pneumoniae moderate growth  · COPD  · Elevated Troponins  · Hypothyroidism    Recommendations:     · Rocephin 2 gm IV q 24 hr (start date 10/23). Stop 11-3-20  · D/C Zithromax 500 mg IV q 24 hr (start date 10/23. Stop date 10-27-20)  · Repeat Chest xray. · Repeat sputum culture. Medical Decision Making/Summary/Discussion:   · Patient with CHF  · Rapid deterioration with acute hypoxia and hypercarbia requiring intubation  · CXR with rapid fluid shifts suggesting pulmonary edema. Very high ProBNP  · Can not exclude associated pneumonia at this stage. Pro calcitonin elevated    Infection Control Recommendations   · [place] Precautions    Antimicrobial Stewardship Recommendations   · Simplification of therapy  · Targeted therapy    Coordination of Outpatient Care:   · Estimated Length of IV antimicrobials:TBD  · Patient will need Midline Catheter Insertion: No  · Patient will need PICC line Insertion:Yes  · Patient will need: Home IV , [place],  SNF,  LTAC: TBD  · Patient will need outpatient wound care:No    Chief complaint/reason for consultation:   · Sepsis secondary to pneumonia    History of Present Illness:   Anderson Watt is a 77y.o.-year-old  female who was initially admitted on 10/22/2020. Patient seen at the request of Dr. Nanette Marmolejo:  History was obtained from chart review and unobtainable from patient due to mental status. She has a history of hypothyroidism, COPD, iron deficiency anemia, presented to [facility] emergency department on 10/22 for difficulties breathing. Found to have pneumonia, and started on the sepsis protocol.       At [facility], she is unable to provide much history as she is somnolent, but does follow commands. per EMR review became on the morning of 10/22, prompting her emergency department visit. There they performed a chest x-ray as well as CT scan which was negative for any pulmonary embolism but did show bilateral infiltrates. Found to have a lactic acidosis as well, and started on Rocephin and azithromycin. Initially hypercapnic, her mentation improved on BiPAP on 10/22. CURRENT EVALUATION: 11/03/20     Patient evaluated and examined in the ICU. Afebrile  VS stable    She was extubated on 11/2/20. At present, she is on nasal canula 5LO2. She is on IV 20 mg Lasix. Leukocytosis resolved. On Rocephin 2 gm IV q 24 hr (start date 10/23). Expect to stop 11-3-20. Gastroenterology:Severe constipation, one episode of vomiting yesterday morning. Tube feed on hold. Follow up on ABD Xray showed gaseous distension of colon suggesting colonic ileus. Severe constipation resolved today. on clear fluids. Pt is having paroxysmal A. Fib with RVR . Currently in sinus rhythm. CXR:  · 10/24 increased bilat infiltrates  · 10/26 shows bilateral upper and lower lobe infiltrates unchanged from previous x-ray. · 10-27-20: shows coarse diffuse bilateral infiltrates with superimposed haziness, worse than yesterday. Patient shows fluid shifts. · 10-28-20: Multifocal interstitial and consolidative infiltrates seen throughout the lungs bilaterally, with worsening consolidation in the right lung base.    · 10-30-20: Bilateral airspace disease again noted, although less prominent    Labs:  BUN: 15>14>19>24>23>25>21->25  Creatinine: 0.37>0.47>0.52>0.48>0.45->0.36    WBC: 13.7 >14.2>9.7>6.5>9.9>8.6>8.9>10.7->9.7  Hgb: 12 >10.5 >11.3>10.9>10.6>10.4-->9.6>9.8->10.3  Platelet: 697 >790 >497>262>962>199>947-->572>274->982    Lactic acid 6.2 > 2.9 > 2.0>1.7>5>0.53    Cultures:  Urine:  ·   Blood:  · 10/22/20: No growth   Sputum :  · 10/24/20: Klebsiella pneumoniae moderate growth. Wound:     CSF         Discussed with patient, RN,     I have personally reviewed the past medical history, past surgical history, medications, social history, and family history, and I have updated the database accordingly.   Past Medical History:     Past Medical History:   Diagnosis Date    COPD (chronic obstructive pulmonary disease) ([address]     Depression     GERD (gastroesophageal reflux disease)     Osteoporosis        Past Surgical  History:     Past Surgical History:   Procedure Laterality Date    BACK SURGERY      BREAST SURGERY      CARPAL TUNNEL RELEASE      FRACTURE SURGERY Left 12/20/2018    ORIF wrist    HYSTERECTOMY      JOINT REPLACEMENT      right knee    JOINT REPLACEMENT      WRIST FRACTURE SURGERY Left 12/20/2018    WRIST OPEN REDUCTION INTERNAL FIXATION-DISTAL RADIUS performed by Isma Aparicio MD at [address]       Medications:      ipratropium-albuterol  1 ampule Inhalation 4x daily    metoprolol tartrate  25 mg Oral BID    sennosides-docusate sodium  2 tablet Oral Daily    amiodarone  200 mg Oral BID    furosemide  20 mg Intravenous Daily    midodrine  10 mg Oral TID WC    spironolactone  25 mg Oral Daily    polyethylene glycol  17 g Oral Daily    cefTRIAXone (ROCEPHIN) IV  2 g Intravenous Q24H    enoxaparin  1 mg/kg Subcutaneous BID    sodium chloride flush  10 mL Intravenous 2 times per day    aspirin  81 mg Oral Daily    famotidine (PEPCID) injection  20 mg Intravenous BID    anesthetic and narcotic custom mixture   Intrathecal Once    levothyroxine  150 mcg Oral Daily    pregabalin  300 mg Oral BID    sodium chloride flush  10 mL Intravenous 2 times per day       Social History:     Social History     Socioeconomic History    Marital status:      Spouse name: Not on file    Number of children: Not on file    Years of education: Not on file    Highest education level: Not on file   Occupational History    Not on file   Social Needs    Financial resource strain: Not on file    Food insecurity     Worry: Not on file     Inability: Not on file    Transportation needs     Medical: Not on file     Non-medical: Not on file   Tobacco Use    Smoking status: Former Smoker     Last attempt to quit: 1976     Years since quittin.0    Smokeless tobacco: Never Used   Substance and Sexual Activity    Alcohol use: No    Drug use: No    Sexual activity: Not on file   Lifestyle    Physical activity     Days per week: Not on file     Minutes per session: Not on file    Stress: Not on file   Relationships    Social connections     Talks on phone: Not on file     Gets together: Not on file     Attends Baptism service: Not on file     Active member of club or organization: Not on file     Attends meetings of clubs or organizations: Not on file     Relationship status: Not on file    Intimate partner violence     Fear of current or ex partner: Not on file     Emotionally abused: Not on file     Physically abused: Not on file     Forced sexual activity: Not on file   Other Topics Concern    Not on file   Social History Narrative    Not on file       Family History:   No family history on file. Allergies:   Patient has no known allergies. Review of Systems:   Review of Systems   Unable to perform ROS: Intubated          Physical Examination :     Patient Vitals for the past 8 hrs:   BP Temp Temp src Pulse Resp SpO2   20 0750 -- -- -- -- 20 99 %   20 0600 (!) 126/55 -- -- 64 13 95 %   20 0500 138/69 -- -- 71 18 90 %   20 0412 (!) 141/69 98.2 °F (36.8 °C) Oral 72 18 91 %   20 0411 -- -- -- -- -- 91 %   20 0400 -- 98.2 °F (36.8 °C) Oral 71 19 92 %   20 0318 (!) 106/51 -- -- 66 16 94 %   20 0300 (!) 104/50 -- -- 65 13 94 %   20 0200 122/67 -- -- 68 13 94 %   20 0100 130/64 -- -- 70 19 93 %     Physical Exam  Constitutional:       General: She is not in acute distress.      Appearance: Component Value Date    CREATININE 0.36 11/03/2020    GLUCOSE 88 11/03/2020       Medical Decision Making-Imaging:   10-30-20:      10 -28 -20:      10-27-20        10/23/20:            Veronica Lopez, MS4

## 2020-11-03 NOTE — PROGRESS NOTES
[facility] - Progress Note    Today's Date and Time: 11/3/2020, 10:58 AM    Impression :   · Shock :septic Vs cardiogenic: resolved. · Bandemia  · CHF with elevated ProBNP  · Pulmonary edema  · Superimposed pneumonia; sputum cultures 10/25/20 grew Klebsiella Pneumoniae moderate growth  · COPD  · Elevated Troponins  · Hypothyroidism    Recommendations:     · Rocephin 2 gm IV q 24 hr (start date 10/23). Stop 11-3-20  · D/C Zithromax 500 mg IV q 24 hr (start date 10/23. Stop date 10-27-20)  · Repeat Chest xray. · Repeat sputum culture. Medical Decision Making/Summary/Discussion:   · Patient with CHF  · Rapid deterioration with acute hypoxia and hypercarbia requiring intubation  · CXR with rapid fluid shifts suggesting pulmonary edema. Very high ProBNP  · Can not exclude associated pneumonia at this stage. Pro calcitonin elevated    Infection Control Recommendations   · [place] Precautions    Antimicrobial Stewardship Recommendations   · Simplification of therapy  · Targeted therapy    Coordination of Outpatient Care:   · Estimated Length of IV antimicrobials:TBD  · Patient will need Midline Catheter Insertion: No  · Patient will need PICC line Insertion:Yes  · Patient will need: Home IV , [place],  SNF,  LTAC: TBD  · Patient will need outpatient wound care:No    Chief complaint/reason for consultation:   · Sepsis secondary to pneumonia    History of Present Illness:   Tee Rosa is a 77y.o.-year-old  female who was initially admitted on 10/22/2020. Patient seen at the request of Dr. Lovell Ing:  History was obtained from chart review and unobtainable from patient due to mental status. She has a history of hypothyroidism, COPD, iron deficiency anemia, presented to [facility] emergency department on 10/22 for difficulties breathing. Found to have pneumonia, and started on the sepsis protocol.       At [facility], she is unable to provide much history as she is somnolent, but does follow commands. per EMR review became on the morning of 10/22, prompting her emergency department visit. There they performed a chest x-ray as well as CT scan which was negative for any pulmonary embolism but did show bilateral infiltrates. Found to have a lactic acidosis as well, and started on Rocephin and azithromycin. Initially hypercapnic, her mentation improved on BiPAP on 10/22. CURRENT EVALUATION: 11/03/20     Patient evaluated and examined in the ICU. Afebrile  VS stable    She was extubated on 11/2/20. At present, she is on nasal canula 5LO2. She is lying on bed. Awake, alert, oriented. Denies fevers, chills. She is on IV 20 mg Lasix. Leukocytosis resolved. On Rocephin 2 gm IV q 24 hr (start date 10/23). Expect to stop today 11-3-20. Gastroenterology:Severe constipation, one episode of vomiting yesterday morning. Tube feed on hold. Follow up on ABD Xray showed gaseous distension of colon suggesting colonic ileus. Severe constipation resolved today. on clear fluids. Pt is having paroxysmal A. Fib with RVR . Currently in sinus rhythm. CXR:  · 10/24 increased bilat infiltrates  · 10/26 shows bilateral upper and lower lobe infiltrates unchanged from previous x-ray. · 10-27-20: shows coarse diffuse bilateral infiltrates with superimposed haziness, worse than yesterday. Patient shows fluid shifts. · 10-28-20: Multifocal interstitial and consolidative infiltrates seen throughout the lungs bilaterally, with worsening consolidation in the right lung base.    · 10-30-20: Bilateral airspace disease again noted, although less prominent    Labs:  BUN: 15>14>19>24>23>25>21->25  Creatinine: 0.37>0.47>0.52>0.48>0.45->0.36    WBC: 13.7 >14.2>9.7>6.5>9.9>8.6>8.9>10.7->9.7  Hgb: 12 >10.5 >11.3>10.9>10.6>10.4-->9.6>9.8->10.3  Platelet: 821 >397 >011>652>662>069>176-->870>704->132    Lactic acid 6.2 > 2.9 > 2.0>1.7>5>0.53    Cultures:  Urine:  ·   Blood:  · 10/22/20: No growth   Sputum :  · 10/24/20: Klebsiella pneumoniae moderate growth. Wound:     CSF         Discussed with patient, RN,     I have personally reviewed the past medical history, past surgical history, medications, social history, and family history, and I have updated the database accordingly.   Past Medical History:     Past Medical History:   Diagnosis Date    COPD (chronic obstructive pulmonary disease) [facility] [address]     Depression     GERD (gastroesophageal reflux disease)     Osteoporosis        Past Surgical  History:     Past Surgical History:   Procedure Laterality Date    BACK SURGERY      BREAST SURGERY      CARPAL TUNNEL RELEASE      FRACTURE SURGERY Left 12/20/2018    ORIF wrist    HYSTERECTOMY      JOINT REPLACEMENT      right knee    JOINT REPLACEMENT      WRIST FRACTURE SURGERY Left 12/20/2018    WRIST OPEN REDUCTION INTERNAL FIXATION-DISTAL RADIUS performed by Ethan Mercado MD at [address]       Medications:      ipratropium-albuterol  1 ampule Inhalation 4x daily    metoprolol tartrate  25 mg Oral BID    sennosides-docusate sodium  2 tablet Oral Daily    amiodarone  200 mg Oral BID    furosemide  20 mg Intravenous Daily    midodrine  10 mg Oral TID WC    spironolactone  25 mg Oral Daily    polyethylene glycol  17 g Oral Daily    cefTRIAXone (ROCEPHIN) IV  2 g Intravenous Q24H    enoxaparin  1 mg/kg Subcutaneous BID    sodium chloride flush  10 mL Intravenous 2 times per day    aspirin  81 mg Oral Daily    famotidine (PEPCID) injection  20 mg Intravenous BID    anesthetic and narcotic custom mixture   Intrathecal Once    levothyroxine  150 mcg Oral Daily    pregabalin  300 mg Oral BID    sodium chloride flush  10 mL Intravenous 2 times per day       Social History:     Social History     Socioeconomic History    Marital status:      Spouse name: Not on file    Number of children: Not on file    Years of education: Not on file    Highest education level: Not on file   Occupational History    Not on file   Social Needs    Financial resource strain: Not on file    Food insecurity     Worry: Not on file     Inability: Not on file    Transportation needs     Medical: Not on file     Non-medical: Not on file   Tobacco Use    Smoking status: Former Smoker     Last attempt to quit: 1976     Years since quittin.0    Smokeless tobacco: Never Used   Substance and Sexual Activity    Alcohol use: No    Drug use: No    Sexual activity: Not on file   Lifestyle    Physical activity     Days per week: Not on file     Minutes per session: Not on file    Stress: Not on file   Relationships    Social connections     Talks on phone: Not on file     Gets together: Not on file     Attends Catholic service: Not on file     Active member of club or organization: Not on file     Attends meetings of clubs or organizations: Not on file     Relationship status: Not on file    Intimate partner violence     Fear of current or ex partner: Not on file     Emotionally abused: Not on file     Physically abused: Not on file     Forced sexual activity: Not on file   Other Topics Concern    Not on file   Social History Narrative    Not on file       Family History:   No family history on file. Allergies:   Patient has no known allergies. Review of Systems:   Review of Systems   Unable to perform ROS: Intubated          Physical Examination :     Patient Vitals for the past 8 hrs:   BP Temp Temp src Pulse Resp SpO2   20 0750 -- -- -- -- 20 99 %   20 0600 (!) 126/55 -- -- 64 13 95 %   20 0500 138/69 -- -- 71 18 90 %   20 0412 (!) 141/69 98.2 °F (36.8 °C) Oral 72 18 91 %   20 0411 -- -- -- -- -- 91 %   20 0400 -- 98.2 °F (36.8 °C) Oral 71 19 92 %   20 0318 (!) 106/51 -- -- 66 16 94 %   20 0300 (!) 104/50 -- -- 65 13 94 %     Physical Exam  Constitutional:       General: She is not in acute distress.      Appearance: Normal appearance. She is obese. She is ill-appearing. HENT:      Head: Normocephalic and atraumatic. Nose: Nose normal. No congestion. Mouth/Throat:      Mouth: Mucous membranes are moist.   Eyes:      General: No scleral icterus. Conjunctiva/sclera: Conjunctivae normal.   Neck:      Musculoskeletal: Neck supple. No neck rigidity. Cardiovascular:      Rate and Rhythm: Normal rate and regular rhythm. Heart sounds: Normal heart sounds. No murmur. Pulmonary:      Effort: No respiratory distress. Breath sounds: Rales present. Abdominal:      General: There is no distension. Palpations: Abdomen is soft. Tenderness: There is no abdominal tenderness. Genitourinary:     Comments: Urine maral  R fem line in good condition  Musculoskeletal:         General: No swelling or tenderness. Skin:     Coloration: Skin is not jaundiced or pale. Neurological:      Comments: Sedated    Psychiatric:      Comments: Calm on the vent sedated          Medical Decision Making -Laboratory:   I have independently reviewed/ordered the following labs:    CBC with Differential:   Recent Labs     11/02/20  0436 11/03/20  0620   WBC 10.7 9.7   HGB 9.8* 10.3*   HCT 31.4* 33.7*    297   LYMPHOPCT 11* 11*   MONOPCT 7 7     BMP:   Recent Labs     11/02/20  0436 11/03/20  0620    139   K 3.3* 3.5*    101   CO2 28 27   BUN 21 25*   CREATININE 0.45* 0.36*   MG 2.3 2.4     Hepatic Function Panel:   No results for input(s): PROT, LABALBU, BILIDIR, IBILI, BILITOT, ALKPHOS, ALT, AST in the last 72 hours. No results for input(s): RPR in the last 72 hours. No results for input(s): HIV in the last 72 hours. No results for input(s): BC in the last 72 hours.   Lab Results   Component Value Date    MUCUS NOT REPORTED 10/30/2020    RBC 3.40 11/03/2020    TRICHOMONAS NOT REPORTED 10/30/2020    WBC 9.7 11/03/2020    YEAST NOT REPORTED 10/30/2020    TURBIDITY TURBID 10/30/2020     Lab Results   Component Value Date    CREATININE 0.36 11/03/2020    GLUCOSE 88 11/03/2020       Medical Decision Making-Imaging:   10-30-20:      10 -28 -20:      10-27-20        10/23/20:            Sophy eBar, MS4    ATTESTATION:    I have discussed the case, including pertinent history and exam findings with the residents and students. I have seen and examined the patient and the key elements of the encounter have been performed by me. I was present when the student obtained his information or examined the patient. I have reviewed the laboratory data, other diagnostic studies and discussed them with the residents. I have updated the medical record where necessary. I agree with the assessment, plan and orders as documented by the resident/ student.     Katherine Asif MD.

## 2020-11-03 NOTE — PLAN OF CARE
Problem: Skin Integrity:  Goal: Will show no infection signs and symptoms  Description: Will show no infection signs and symptoms  Outcome: Ongoing  Goal: Absence of new skin breakdown  Description: Absence of new skin breakdown  Outcome: Ongoing     Problem: Falls - Risk of:  Goal: Will remain free from falls  Description: Will remain free from falls  Outcome: Ongoing  Goal: Absence of physical injury  Description: Absence of physical injury  Outcome: Ongoing     Problem: Pain:  Goal: Pain level will decrease  Description: Pain level will decrease  Outcome: Ongoing  Goal: Control of acute pain  Description: Control of acute pain  Outcome: Ongoing  Goal: Control of chronic pain  Description: Control of chronic pain  Outcome: Ongoing     Problem: OXYGENATION/RESPIRATORY FUNCTION  Goal: Patient will maintain patent airway  Outcome: Ongoing  Goal: Patient will achieve/maintain normal respiratory rate/effort  Description: Respiratory rate and effort will be within normal limits for the patient  Outcome: Ongoing     Problem: MECHANICAL VENTILATION  Goal: Patient will maintain patent airway  Outcome: Ongoing  Goal: Oral health is maintained or improved  Outcome: Ongoing  Goal: ET tube will be managed safely  Outcome: Ongoing  Goal: Ability to express needs and understand communication  Outcome: Ongoing  Goal: Mobility/activity is maintained at optimum level for patient  Outcome: Ongoing     Problem: SKIN INTEGRITY  Goal: Skin integrity is maintained or improved  Outcome: Ongoing     Problem: Nutrition  Goal: Optimal nutrition therapy  Description: Nutrition Problem #1: Inadequate oral intake  Intervention: Food and/or Nutrient Delivery: Start nutrition as able; if TF, suggest Standard without Fiber goal 65 mL/hr to provide 1872 kcal and 87 g pro/day.   Nutritional Goals: meet % of estimated nutrient needs     Outcome: Ongoing     Problem: Confusion - Acute:  Goal: Absence of continued neurological deterioration signs and symptoms  Description: Absence of continued neurological deterioration signs and symptoms  Outcome: Ongoing  Goal: Mental status will be restored to baseline  Description: Mental status will be restored to baseline  Outcome: Ongoing     Problem: Discharge Planning:  Goal: Ability to perform activities of daily living will improve  Description: Ability to perform activities of daily living will improve  Outcome: Ongoing  Goal: Participates in care planning  Description: Participates in care planning  Outcome: Ongoing     Problem: Injury - Risk of, Physical Injury:  Goal: Will remain free from falls  Description: Will remain free from falls  Outcome: Ongoing  Goal: Absence of physical injury  Description: Absence of physical injury  Outcome: Ongoing     Problem: Mood - Altered:  Goal: Mood stable  Description: Mood stable  Outcome: Ongoing  Goal: Absence of abusive behavior  Description: Absence of abusive behavior  Outcome: Ongoing  Goal: Verbalizations of feeling emotionally comfortable while being cared for will increase  Description: Verbalizations of feeling emotionally comfortable while being cared for will increase  Outcome: Ongoing     Problem: Psychomotor Activity - Altered:  Goal: Absence of psychomotor disturbance signs and symptoms  Description: Absence of psychomotor disturbance signs and symptoms  Outcome: Ongoing     Problem: Sensory Perception - Impaired:  Goal: Demonstrations of improved sensory functioning will increase  Description: Demonstrations of improved sensory functioning will increase  Outcome: Ongoing  Goal: Decrease in sensory misperception frequency  Description: Decrease in sensory misperception frequency  Outcome: Ongoing  Goal: Able to refrain from responding to false sensory perceptions  Description: Able to refrain from responding to false sensory perceptions  Outcome: Ongoing  Goal: Demonstrates accurate environmental perceptions  Description: Demonstrates accurate

## 2020-11-03 NOTE — PROGRESS NOTES
Comprehensive Nutrition Assessment    Type and Reason for Visit:  Reassess    Nutrition Recommendations/Plan: Suggest diet per ST recommendations; Dysphagia III (soft and bite sized) with thin liquids. Will monitor tolerance to diet and adequacy of intake. Nutrition Assessment:  Pt is now extubated. TF discontinued. Pt is currently on CL diet; RN reports questionable tolerance. S/p swallow study; ST recommends Dysphagia III with thin liquids. Meds/labs reviewed. Malnutrition Assessment:  Malnutrition Status: At risk for malnutrition     Estimated Daily Nutrient Needs:  Energy (kcal):  1951-3662 kcal/day; Weight Used for Energy Requirements:  Admission     Protein (g):  80 g pro/day; Weight Used for Protein Requirements:  Ideal(1.5)          Current Nutrition Therapies:    DIET CLEAR LIQUID; Anthropometric Measures:  · Height: 5' 4\" (162.6 cm)  · Current Body Weight: 155 lb 13.8 oz (70.7 kg)   · Admission Body Weight: 157 lb 6.5 oz (71.4 kg)    · Ideal Body Weight: 120 lbs; % Ideal Body Weight  129%   · BMI: 26.7  · BMI Categories: Overweight (BMI 25.0-29. 9)       Nutrition Diagnosis:   · Inadequate oral intake related to recent extubation, current diet order as evidenced by CL diet    Nutrition Interventions:   Food and/or Nutrient Delivery:  Suggest diet per ST recommendations; Dysphagia III (soft and bite sized) with thin liquids. Nutrition Education/Counseling:  No recommendation at this time   Coordination of Nutrition Care:  Continue to monitor while inpatient    Goals:  meet % of estimated nutrient needs       Nutrition Monitoring and Evaluation:   Food/Nutrient Intake Outcomes:  Diet Advancement/Tolerance, Food and Nutrient Intake  Physical Signs/Symptoms Outcomes:  Biochemical Data, Chewing or Swallowing, Nutrition Focused Physical Findings, Skin, Weight     Discharge Planning:     Too soon to determine     Electronically signed by Karla Blackmon RD, LD on 11/3/20 at 12:38 PM

## 2020-11-03 NOTE — PROGRESS NOTES
Speech Language Pathology  Facility/Department: [address] MICU   CLINICAL BEDSIDE SWALLOW EVALUATION    NAME: Michael Sanchez  : 1945  MRN: [id number]    ADMISSION DATE: 10/22/2020  ADMITTING DIAGNOSIS: has Pneumonia of both lower lobes due to infectious organism; Acute metabolic encephalopathy; Hypothyroidism; Major depressive disorder; Chronic midline low back pain without sciatica; Iron deficiency anemia; Status post surgery; Sepsis due to pneumonia ([address] Lactic acidosis; Septic shock ([address] Severe sepsis ([address] COPD exacerbation ([address] Acute respiratory failure with hypoxia and hypercapnia ([address] Elevated troponin; and Encephalopathy on their problem list.    Date of Eval: 11/3/2020  Evaluating Therapist: Kerry Bear    Current Diet level:  Current Diet : NPO  Current Liquid Diet : NPO      Primary Complaint     Michael Sanchez is a 77 y.o. female with a history of hypothyroidism, COPD, iron deficiency anemia, presented to [facility] emergency department earlier today for difficulties breathing. Found to have pneumonia, and started on the sepsis protocol. Here she is unable to provide much history as she is somnolent, but does follow commands. per EMR review she was pending coughing last night became confused this morning prompting her emergency department visit. There they performed a chest x-ray as well as CT scan which was negative for any pulmonary embolism but did show bilateral infiltrates. Found to have a lactic acidosis as well, and started on Rocephin and azithromycin. Initially hypercapnic although apparently her mentation improved on BiPAP.     Pain:  Pain Assessment  Pain Assessment: CPOT  Pain Level: 0  Patient's Stated Pain Goal: No pain  Pain Type: Chronic pain  Non-Pharmaceutical Pain Intervention(s): Emotional support  RASS Score: Alert and calm    Reason for Referral  Michael Sanchez was referred for a bedside swallow evaluation to assess the efficiency of her swallow function, identify signs and symptoms of aspiration and make recommendations regarding safe dietary consistencies, effective compensatory strategies, and safe eating environment. Impression   Pt with no overt s/s of aspiration with puree, soft solids, nectar thick liquid, and thin liquid consistencies. Pt with no evidence of oral phase dysphagia and initiates swallow independently. ST recommends Dysphagia soft and bite/sized (Dysphagia III) and thin liquid consistencies. If s/s of aspiration occur, discontinue PO and recommend MBSS to confirm/rule out aspiration. Results and recommendations reported to RN. Education provided. Treatment Plan  Requires SLP Intervention: Yes  Duration/Frequency of Treatment: 3-5x per week  D/C Recommendations: Further therapy recommended at discharge. Recommended Diet and Intervention  Diet Solids Recommendation: Dysphagia Soft and Bite-Sized (Dysphagia III)  Liquid Consistency Recommendation: Thin        Therapeutic Interventions: Diet tolerance monitoring;Patient/Family education    Compensatory Swallowing Strategies  Compensatory Swallowing Strategies: Alternate solids and liquids;Eat/Feed slowly;Upright as possible for all oral intake;Small bites/sips    Treatment/Goals  Dysphagia Goals: The patient will tolerate recommended diet without observed clinical signs of aspiration    General  Chart Reviewed: Yes  Behavior/Cognition: Alert; Cooperative  Temperature Spikes Noted: No  Respiratory Status: Room air  O2 Device: None (Room air)  Follows Directions: Complex  Dentition: Adequate  Patient Positioning: Upright in bed  Consistencies Administered: Dysphagia Pureed (Dysphagia I); Thin - straw; Thin - teaspoon;Nectar - straw;Nectar - teaspoon     Vision/Hearing  Vision  Vision: Within Functional Limits  Hearing  Hearing: Within functional limits    Oral Motor Deficits  Oral/Motor  Oral Motor:  Within functional limits    Oral Phase Dysfunction  Oral Phase  Oral Phase: [facility] Indicators of Pharyngeal Phase Dysfunction   Pharyngeal Phase  Pharyngeal Phase: WFL  Pharyngeal Phase   Pharyngeal: no overt s/s of aspiration with all consistencies    Prognosis  Prognosis  Prognosis for safe diet advancement: fair  Individuals consulted  Consulted and agree with results and recommendations: Patient;RN    Education  Patient Education: yes  Patient Education Response: Verbalizes understanding        Therapy Time  SLP Individual Minutes  Time In: 1120  Time Out: 9097  Minutes: 12          Completed by: Iain Aviles,  Clinician    Cosigned By: Mariama Cárdenas S.CCC/SLP      11/3/2020 11:39 AM

## 2020-11-03 NOTE — PROGRESS NOTES
Occupational Therapy   Occupational Therapy Initial Assessment  Date: 11/3/2020   Patient Name: Cleopatra Whitfield  MRN: [id number]     : 1945    Date of Service: 11/3/2020    Discharge Recommendations:  Patient would benefit from continued therapy after discharge     Copied from Critical Care:  History was obtained from chart review and unobtainable from patient due to mental status. Cleopatra Whitfield is a 77 y.o. female with a history of hypothyroidism, COPD, iron deficiency anemia, presented to [facility] emergency department earlier today for difficulties breathing. Found to have pneumonia, and started on the sepsis protocol. Here she is unable to provide much history as she is somnolent, but does follow commands. per EMR review she was pending coughing last night became confused this morning prompting her emergency department visit. There they performed a chest x-ray as well as CT scan which was negative for any pulmonary embolism but did show bilateral infiltrates. Found to have a lactic acidosis as well, and started on Rocephin and azithromycin. Initially hypercapnic although apparently her mentation improved on BiPAP.     Currently she is somnolent but will slowly respond to questions. Does say it is  and the president is [place]. Will follow commands but otherwise unable to provide much history.     Of note she had an echocardiogram done on 2019 which showed a preserved ejection fraction of 55%  Assessment    Pt lying supine in bed upon entrance to room. Pt completed bed mobility with mod assistance. Pt sat at eob ~5 minutes with good unsupported sitting balance with forward flexed posture. Sit to stand with mod x 2 with hand  assistance. Pt completed sit to stand transfer with masoud steady mod assistance. Pt ed on pro-per use of incentive spirometer, completed 10 reps reaching 250ml's. Pt instructed to complete 10 reps every hour with spouse providing a reminder when visiting.  Pt and spouse in agreement. Please refer to below assist levels for adl completion. Pt ed on OT POC, safety awareness tech, proper hand placement for transfers, and energy conservation tech with proper breathing tech with fair return. Pt retired supine in bed with call light and phone in reach. All needs met upon exit. Performance deficits / Impairments: Decreased functional mobility ; Decreased safe awareness;Decreased balance;Decreased coordination;Decreased ADL status; Decreased posture;Decreased ROM; Decreased endurance;Decreased high-level IADLs;Decreased strength;Decreased fine motor control  Treatment Diagnosis: Sepsis  Prognosis: Fair  Decision Making: Medium Complexity  OT Education: OT Role;Plan of Care  REQUIRES OT FOLLOW UP: Yes  Activity Tolerance  Activity Tolerance: Patient limited by fatigue  Safety Devices  Safety Devices in place: Yes  Type of devices: Call light within reach; Left in chair;Nurse notified(spouse present upon entrance)  Restraints  Initially in place: No         Patient Diagnosis(es): There were no encounter diagnoses. has a past medical history of COPD (chronic obstructive pulmonary disease) ([facility] Depression, GERD (gastroesophageal reflux disease), and Osteoporosis. has a past surgical history that includes Hysterectomy; back surgery; Breast surgery; Carpal tunnel release; joint replacement; joint replacement; fracture surgery (Left, 12/20/2018); and Wrist fracture surgery (Left, 12/20/2018).     Treatment Diagnosis: Sepsis      Restrictions  Restrictions/Precautions  Restrictions/Precautions: Up as Tolerated, Fall Risk  Required Braces or Orthoses?: No  Position Activity Restriction  Other position/activity restrictions: 02 nc 5L-no 02 at home    Subjective   General  Patient assessed for rehabilitation services?: Yes  Family / Caregiver Present: Yes(spouse present upon entrance)  Patient Currently in Pain: No  Pain Assessment  Pain Assessment: 0-10  Pain Level: 0  Vital Signs  Patient Currently in Pain: No  Oxygen Therapy  SpO2: 91 %  Social/Functional History  Social/Functional History  Lives With: Spouse  Type of Home: House  Home Layout: One level  Home Access: Stairs to enter with rails  Entrance Stairs - Number of Steps: 1 + 1 to enter  Entrance Stairs - Rails: None  Bathroom Shower/Tub: Tub/Shower unit, Curtain  Bathroom Toilet: Standard  Bathroom Equipment: Shower chair  Home Equipment: Rolling walker, Cane  Receives Help From: Family  ADL Assistance: Independent  Homemaking Assistance: Independent  Homemaking Responsibilities: Yes  Meal Prep Responsibility: Primary  Laundry Responsibility: Primary  Cleaning Responsibility: Secondary(shared with spouse)  Bill Paying/Finance Responsibility: No(spouse completes)  Shopping Responsibility: Secondary(shared with spouse. pt able to push cart throughout [place])  Dependent Care Responsibility: Secondary(care for dog Buffy-shared with spouse)  Ambulation Assistance: Independent  Transfer Assistance: Independent  Active : Yes(spouse usually drives)  Mode of Transportation: [employer]  Occupation: Retired  Additional Comments: spouse in good health and supportive     Objective   Vision: Within Functional Limits  Hearing: Within functional limits    Orientation  Overall Orientation Status: Within Functional Limits     Balance  Sitting Balance: Contact guard assistance  Standing Balance: Moderate assistance(in masoud steady)  Standing Balance  Time: ;~30 sec standing bedside with mod x 2; ~2 min in masoud steady  Activity: static standing in masoud steady  ADL  Feeding: Minimal assistance; Adaptive utensils (comment); Bringing food to mouth assist;Increased time to complete; Beverage management(pt unable to open lemon ice, unable to take lid off jello bowl. Pt with increased BUE weakness. Pt reports new since being in the hospital. Pt unable to bring cup to mouth with straw, min a required. Min a for hand to mouth patterning with spoon with minimal spillage.  Pt issued built up handles to increase ease and I with feeding. After a few trials, pt successful with hand to mouth patterning using built up handle and I with extended time to manipulate jello onto spoon. Pt, spouse and RN informed to remove built up handles x 2 from tray when done eating. Grooming: Minimal assistance;Setup;Verbal cueing; Increased time to complete  UE Bathing: Moderate assistance;Setup;Verbal cueing; Increased time to complete  LE Bathing: Maximum assistance;Setup;Verbal cueing; Increased time to complete  UE Dressing: Moderate assistance;Setup;Verbal cueing; Increased time to complete  LE Dressing: Maximum assistance;Setup;Verbal cueing; Increased time to complete  Toileting: (pure wick)  Tone RUE  RUE Tone: Normotonic  Tone LUE  LUE Tone: Normotonic  Coordination  Movements Are Fluid And Coordinated: No  Coordination and Movement description: Gross motor impairments; Left UE;Decreased speed     Bed mobility  Rolling to Right: Minimal assistance  Supine to Sit: Moderate assistance  Scooting: Moderate assistance  Transfers  Sit to stand: Moderate assistance;2 Person assistance(hand held assist)  Stand to sit: Moderate assistance  Transfer Comments: Pt then stood with Trace [place] Mod A. Pt had difficulty lifting feet onto Trace Iron platform d/t new onset of foot drop and BLE weakness     Cognition  Overall Cognitive Status: Exceptions  Arousal/Alertness: Delayed responses to stimuli(pt with increased lethargy this date)  Safety Judgement: Decreased awareness of need for assistance  Initiation: Requires cues for some  Sequencing: Requires cues for some     Sensation  Overall Sensation Status: WFL(denies numbness/tingling B hands)      LUE PROM (degrees)  LUE PROM: Exceptions  L Shoulder Flex  0-180: 0-110  LUE AROM (degrees)  LUE AROM : Exceptions  LUE General AROM: pt with slow, labored movements.  increased weakness noted  L Shoulder Flexion 0-180: 0-80-spouse reports old injury after a fall many years

## 2020-11-03 NOTE — PLAN OF CARE
Problem: Confusion - Acute:  Goal: Absence of continued neurological deterioration signs and symptoms  Description: Absence of continued neurological deterioration signs and symptoms  11/3/2020 1513 by Theadora Marvin  Outcome: Met This Shift  11/3/2020 0217 by Marjorie Garcia RN  Outcome: Ongoing  Goal: Mental status will be restored to baseline  Description: Mental status will be restored to baseline  11/3/2020 1513 by Theadora Dy  Outcome: Met This Shift  11/3/2020 0217 by Marjorie Garcia RN  Outcome: Ongoing     Problem: Skin Integrity:  Goal: Will show no infection signs and symptoms  Description: Will show no infection signs and symptoms  11/3/2020 1513 by Theadora Dy  Outcome: Ongoing  11/3/2020 0217 by Marjorie Garcia RN  Outcome: Ongoing  Goal: Absence of new skin breakdown  Description: Absence of new skin breakdown  11/3/2020 1513 by Theadora Dy  Outcome: Ongoing  11/3/2020 0217 by Marjorie Garcia RN  Outcome: Ongoing     Problem: Falls - Risk of:  Goal: Will remain free from falls  Description: Will remain free from falls  11/3/2020 1513 by Theadora Dy  Outcome: Ongoing  11/3/2020 0217 by Marjorie Garcia RN  Outcome: Ongoing  Goal: Absence of physical injury  Description: Absence of physical injury  11/3/2020 1513 by Theadora Dy  Outcome: Ongoing  11/3/2020 0217 by Marjorie Garcia RN  Outcome: Ongoing     Problem: Pain:  Goal: Pain level will decrease  Description: Pain level will decrease  11/3/2020 1513 by Theadora Dy  Outcome: Ongoing  11/3/2020 0217 by Marjorie Garcia RN  Outcome: Ongoing  Goal: Control of acute pain  Description: Control of acute pain  11/3/2020 1513 by Theadora Dy  Outcome: Ongoing  11/3/2020 0217 by Marjorie Garcia RN  Outcome: Ongoing  Goal: Control of chronic pain  Description: Control of chronic pain  11/3/2020 1513 by Theadora Marvin  Outcome: Ongoing  11/3/2020 0217 by Marjorie Garcia RN  Outcome: Ongoing     Problem: OXYGENATION/RESPIRATORY Problem: Mood - Altered:  Goal: Mood stable  Description: Mood stable  11/3/2020 1513 by Chuck Shane  Outcome: Ongoing  11/3/2020 0217 by Tejal Wright RN  Outcome: Ongoing  Goal: Absence of abusive behavior  Description: Absence of abusive behavior  11/3/2020 1513 by Chuck Svitlana  Outcome: Ongoing  11/3/2020 0217 by Tejal Wright RN  Outcome: Ongoing  Goal: Verbalizations of feeling emotionally comfortable while being cared for will increase  Description: Verbalizations of feeling emotionally comfortable while being cared for will increase  11/3/2020 1513 by Chuck Shane  Outcome: Ongoing  11/3/2020 0217 by Tejal Wright RN  Outcome: Ongoing     Problem: Psychomotor Activity - Altered:  Goal: Absence of psychomotor disturbance signs and symptoms  Description: Absence of psychomotor disturbance signs and symptoms  11/3/2020 1513 by Chuck Shane  Outcome: Ongoing  11/3/2020 0217 by Tejal Wright RN  Outcome: Ongoing     Problem: Sensory Perception - Impaired:  Goal: Demonstrations of improved sensory functioning will increase  Description: Demonstrations of improved sensory functioning will increase  11/3/2020 1513 by Chuck Shane  Outcome: Ongoing  11/3/2020 0217 by Tejal Wright RN  Outcome: Ongoing  Goal: Decrease in sensory misperception frequency  Description: Decrease in sensory misperception frequency  11/3/2020 1513 by Chuck Shane  Outcome: Ongoing  11/3/2020 0217 by Tejal Wright RN  Outcome: Ongoing  Goal: Able to refrain from responding to false sensory perceptions  Description: Able to refrain from responding to false sensory perceptions  11/3/2020 1513 by Chuck Shane  Outcome: Ongoing  11/3/2020 0217 by Tejal Wright RN  Outcome: Ongoing  Goal: Demonstrates accurate environmental perceptions  Description: Demonstrates accurate environmental perceptions  11/3/2020 1513 by Chuck Shane  Outcome: Ongoing  11/3/2020 0217 by Tejal Wright RN  Outcome: Ongoing  Goal: Able to distinguish between reality-based and nonreality-based thinking  Description: Able to distinguish between reality-based and nonreality-based thinking  11/3/2020 1513 by Annemarie Ahuja  Outcome: Ongoing  11/3/2020 0217 by Jordana Qiu RN  Outcome: Ongoing  Goal: Able to interrupt nonreality-based thinking  Description: Able to interrupt nonreality-based thinking  11/3/2020 1513 by Annemarie Ahuja  Outcome: Ongoing  11/3/2020 0217 by Jordana Qiu RN  Outcome: Ongoing     Problem: Sleep Pattern Disturbance:  Goal: Appears well-rested  Description: Appears well-rested  11/3/2020 1513 by Annemarie Ahuja  Outcome: Ongoing  11/3/2020 0217 by Jordana Qiu RN  Outcome: Ongoing     Problem: MECHANICAL VENTILATION  Goal: Patient will maintain patent airway  11/3/2020 1512 by Annemarie Ahuja  Outcome: Completed  11/3/2020 0217 by Jordana Qiu RN  Outcome: Ongoing  Goal: Oral health is maintained or improved  11/3/2020 1512 by Annemarie Ahuja  Outcome: Completed  11/3/2020 0217 by Jordana Qiu RN  Outcome: Ongoing  Goal: ET tube will be managed safely  11/3/2020 1512 by Annemarie Ahuja  Outcome: Completed  11/3/2020 0217 by Jordana Qiu RN  Outcome: Ongoing  Goal: Ability to express needs and understand communication  11/3/2020 1512 by Annemarie Ahuja  Outcome: Completed  11/3/2020 0217 by Jordana Qiu RN  Outcome: Ongoing  Goal: Mobility/activity is maintained at optimum level for patient  11/3/2020 1512 by Annemarie Ahuja  Outcome: Completed  11/3/2020 0217 by Jordana Qiu RN  Outcome: Ongoing

## 2020-11-03 NOTE — PROGRESS NOTES
Critical care team - Resident sign-out to medicine service      Date and time: 11/3/2020 10:42 AM  Patient's name:  Anderson Watt  Medical Record Number: [id number]  Patient's account/billing number: [de-identified]  Patient's YOB: 1945  Age: 77 y.o. Date of Admission: 10/22/2020  6:58 PM  Length of stay during current admission: 12    Primary Care Physician: Gertrude Rob MD    Code Status: Full Code    Mode of physician to physician communication:        [x] Via telephone   [] In person     Date and time of sign-out: 11/3/2020 10:42 AM    Accepting Internal Medicine resident: Dania Delgado     Accepting Medicine team: INTERMED    Accepting team's attending: Dr. Harry Doyle    Patient's current ICU Bed: 126    Patient's assigned bed on floor:  436        [x] Med-Surg Monitored [] Step-down       [] Psychiatry ICU       [] Psych floor     Reason for ICU admission:   Acute on chronic hypoxic hypercapnic respiratory failure    ICU course summary:   60-year-old female with a history of COPD, hypothyroidism, initially admitted on 10/22/2020 with acute on chronic hypoxic and hypercapnic respiratory failure due to bilateral pneumonia. She got intubated and sedated with propofol. Patient was in septic shock. Patient was treated with IV antibiotics with azithromycin and ceftriaxone per ID recommendation. Initially treated with Levophed for shock later on weaned off. Patient was also found to have new onset systolic dysfunction with EF of 25 to 30% drop from 55% on echo 6/24/2019 likely Takotsubo cardiomyopathy. He was also found to have suspected left LV thrombus. Cardiology evaluated the patient. Was taken to the cardiac cath she was found to have minimal coronary artery disease. Other than that patient also developed new onset atrial fibrillation currently in normal sinus rhythm. Patient was started on anticoagulation with Lovenox 70 twice daily for both A. fib and left LV thrombus.   Other than that she was also started on Lopressor 25 twice daily, amiodarone 200 mg twice daily and Aldactone 25 daily. As per cardiology recommendation patient will need LifeVest prior to discharge. Neurology evaluated the patient because she was encephalopathic on admission. Patient was hooked up to LTME, there was no evidence of seizure. LTME was discontinued, no further recommendation from neurology and signed off. Extubated on 11/2/2020 without immediate complication, put on 5 L oxygen saturating 94%. Patient passed bedside swallow eval,  Started on clear liquid diet. Tolerated well in the beginning but later started cough. Speech eval has been consulted who yet has to see the patient. Currently patient is saturating well, vitally hemodynamically stable and stable enough to be transferred out of ICU  Procedures during patient's ICU stay:     Intubation, extubation, central line, LTME, cardiac cath    Current Vitals:     BP (!) 126/55   Pulse 64   Temp 98.2 °F (36.8 °C) (Oral)   Resp 20   Ht 5' 4\" (1.626 m)   Wt 155 lb 13.8 oz (70.7 kg)   SpO2 99%   BMI 26.75 kg/m²       Cultures:     Blood cultures:                 [] None drawn      [] Negative             []  Positive (Details:  )  Urine Culture:                   [] None drawn      [] Negative             []  Positive (Details:  )  Sputum Culture:               [] None drawn       [] Negative             []  Positive (Details:  )   Endotracheal aspirate:     [] None drawn       [] Negative             []  Positive (Details:  )       Consults:     1. Cardiology 2. Neurology 3.  Infectious disease    Assessment:     Patient Active Problem List    Diagnosis Date Noted    Acute metabolic encephalopathy 95/46/4337     Priority: High     Class: Acute    Encephalopathy     Acute respiratory failure with hypoxia and hypercapnia (HCC)     Elevated troponin     Severe sepsis ([facility] [address] 10/22/2020    COPD exacerbation ([facility] [address] 10/22/2020    Sepsis due to pneumonia ([facility] [address] 08/16/2020    Lactic acidosis 08/16/2020    Septic shock ([address] 08/16/2020    Status post surgery 12/20/2018    Pneumonia of both lower lobes due to infectious organism 10/05/2018    Hypothyroidism 10/05/2018    Major depressive disorder 10/05/2018    Chronic midline low back pain without sciatica 10/05/2018    Iron deficiency anemia 10/05/2018     Recommended Follow-up:   CNS  Toxic metabolic encephalopathy. Resolved  No evidence of elective form discharges on LTME. LTM discontinued. Patient is alert and oriented and following commands      [facility]  New onset LV systolic dysfunction with EF 25 to 30% likely Takotsubo cardiomyopathy  NICM, HFrEF, and minimal CAD on cath  Paroxysmal A. fib with RVR. Currently in NSR  Left bundle branch block  Elevated troponin likely demand ischemia. Shock likely septic Vs cardiogenic. Resolved   Continue Lopressor 25 mg bid, amiodarone 200 mg bid, Aldactone 25 mg daily  Continue ProAmatine 10 mg tid  Continue Iv Lasix 20 mg bid   Continue enoxaparin 70 mg twice daily for anticoagulation for possible LV thrombus and Afib. Change to NOAC upon discharge. Patient will need LifeVest prior to discharge.     Resp:  Acute hypoxic and hypercapnic respiratory failure from b/l pneumonia Vs pulmonary edema. Resolving   sputum cultures 10/25/20 grew Klebsiella Pneumoniae moderate growth  S/p extubated 11/2/20  Continue IV Rocephin 2 g IV every 24H till 11/3/2020. Stopped today   Continue bronchodilators I.e DuoNeb and albuterol     GI:  Severe constipation from opoid. Resolved   On clear liquids   Continue IV Pepcid for GI prophylaxis     ID:  Possible bilateral pneumonia  Finish azithromycin, on Rocephin 2 g IV 24H till 11/3  WBC count stable   Afebrile  ID following along      Renal/fluid/electrolytes:  UOP 1.6 L in last 24-hour, net +7.6 L since admit  Lasix 20 mg bid   Hypokalemia. Replace as needed.   Strict I/Os     Endo:  Hypothyroidism   Continue Synthroid 150 mcg daily.  Lindsay Christiansen MD.  Internal Medicine Resident PGY [address]   11/3/2020, 10:42 AM

## 2020-11-03 NOTE — PROGRESS NOTES
that includes Hysterectomy; back surgery; Breast surgery; Carpal tunnel release; joint replacement; joint replacement; fracture surgery (Left, 12/20/2018); and Wrist fracture surgery (Left, 12/20/2018). Restrictions  Restrictions/Precautions  Restrictions/Precautions: Up as Tolerated, Fall Risk  Required Braces or Orthoses?: No  Position Activity Restriction  Other position/activity restrictions: 02 nc 5L-no 02 at home  Vision/Hearing  Vision: Within Functional Limits  Hearing: Within functional limits     Subjective  General  Patient assessed for rehabilitation services?: Yes  Family / Caregiver Present: Yes(Spouse)  Follows Commands: Within Functional Limits  General Comment  Comments: Pt supine in bed. Sleeping but easily awakened. Subjective  Subjective: Pt glad to try and get out of bed.   Pain Screening  Patient Currently in Pain: Denies  Vital Signs  Patient Currently in Pain: Denies       Orientation  Orientation  Overall Orientation Status: Within Functional Limits  Social/Functional History  Social/Functional History  Lives With: Spouse  Type of Home: House  Home Layout: One level  Home Access: Stairs to enter with rails  Entrance Stairs - Number of Steps: 3  Bathroom Shower/Tub: Tub/Shower unit  Bathroom Equipment: Shower chair  Home Equipment: Rolling walker, Cane  ADL Assistance: Independent  Homemaking Assistance: Independent  Homemaking Responsibilities: Yes  Ambulation Assistance: Independent  Transfer Assistance: Independent  Active : Yes(Spouse usually drives.)  Occupation: Retired  Cognition   Cognition  Overall Cognitive Status: WFL    Objective          AROM RLE (degrees)  RLE AROM: WFL  AROM LLE (degrees)  LLE AROM : WFL  AROM RUE (degrees)  RUE AROM : WFL  AROM LUE (degrees)  LUE AROM : WFL  LUE General AROM: Limited at shoulder d/t previous fracture  Strength RLE  Strength RLE: Exception  R Hip Flexion: 4-/5  R Hip ABduction: 4/5  R Hip ADduction: 4/5  R Knee Flexion: 4/5  R Knee Extension: 4/5  R Ankle Dorsiflexion: 3-/5  R Ankle Plantar flexion: 3-/5  Strength LLE  Strength LLE: WFL  Comment: except DF/PF 3-/5  Strength RUE  Strength RUE: WFL  Strength LUE  Comment: Grossly 3+/5  Motor Control  Gross Motor?: (Pt has difficulty grasping.)  Sensation  Overall Sensation Status: WFL  Bed mobility  Rolling to Right: Minimal assistance  Supine to Sit: Moderate assistance  Scooting: Moderate assistance  Transfers  Sit to Stand: Moderate Assistance;2 Person Assistance. Initially attempted to stand  HHA +2. Stand to sit: Moderate Assistance;2 Person Assistance  Pt then stood with Kathleen Seats Mod A. Pt had difficulty lifting feet onto Kathleen Seats platform d/t new onset of foot drop. Ambulation  Ambulation?: No     Balance  Sitting - Static: Fair;+  Sitting - Dynamic: Fair  Standing - Static: Fair;+  Standing - Dynamic: Fair  Comments: Standing in [facility]  Times per week: 5-6x/week  Current Treatment Recommendations: Strengthening, Transfer Training, Endurance Training, Gait Training, Functional Mobility Training, Stair training, Safety Education & Training  Safety Devices  Type of devices: Call light within reach, Left in chair, Nurse notified, Gait belt    AM-PAC Score  AM-PAC Inpatient Mobility Raw Score : 9 (11/03/20 1427)  AM-PAC Inpatient T-Scale Score : 30.55 (11/03/20 1427)  Mobility Inpatient CMS 0-100% Score: 81.38 (11/03/20 1427)  Mobility Inpatient CMS G-Code Modifier : CM (11/03/20 1427)    Goals  Short term goals  Time Frame for Short term goals: 14 visits  Short term goal 1: Pt to perform bed mobility SBA  Short term goal 2: Pt to transfer SBA  Short term goal 3: Pt to ambulate with least restricitve device 150 ft  Short term goal 4: Pt to navigate 3 steps with SBA  Short term goal 5: Pt to tolerate 45 minutes of activity to improve strength and endurance. Patient Goals   Patient goals : Get stronger so she can go home.        Therapy Time   Individual Concurrent Group Co-treatment   Time In 1316         Time Out 1348         Minutes 32         Timed Code Treatment Minutes: 112 [facility],

## 2020-11-04 LAB
ABSOLUTE EOS #: 0.14 K/UL (ref 0–0.44)
ABSOLUTE IMMATURE GRANULOCYTE: 0.07 K/UL (ref 0–0.3)
ABSOLUTE LYMPH #: 1.1 K/UL (ref 1.1–3.7)
ABSOLUTE MONO #: 0.81 K/UL (ref 0.1–1.2)
ANION GAP SERPL CALCULATED.3IONS-SCNC: 11 MMOL/L (ref 9–17)
BASOPHILS # BLD: 0 % (ref 0–2)
BASOPHILS ABSOLUTE: <0.03 K/UL (ref 0–0.2)
BUN BLDV-MCNC: 17 MG/DL (ref 8–23)
BUN/CREAT BLD: ABNORMAL (ref 9–20)
CALCIUM SERPL-MCNC: 8.8 MG/DL (ref 8.6–10.4)
CHLORIDE BLD-SCNC: 95 MMOL/L (ref 98–107)
CO2: 29 MMOL/L (ref 20–31)
CREAT SERPL-MCNC: 0.4 MG/DL (ref 0.5–0.9)
DIFFERENTIAL TYPE: ABNORMAL
EOSINOPHILS RELATIVE PERCENT: 2 % (ref 1–4)
GFR AFRICAN AMERICAN: >60 ML/MIN
GFR NON-AFRICAN AMERICAN: >60 ML/MIN
GFR SERPL CREATININE-BSD FRML MDRD: ABNORMAL ML/MIN/{1.73_M2}
GFR SERPL CREATININE-BSD FRML MDRD: ABNORMAL ML/MIN/{1.73_M2}
GLUCOSE BLD-MCNC: 88 MG/DL (ref 70–99)
HCT VFR BLD CALC: 29.3 % (ref 36.3–47.1)
HEMOGLOBIN: 9.2 G/DL (ref 11.9–15.1)
IMMATURE GRANULOCYTES: 1 %
LYMPHOCYTES # BLD: 12 % (ref 24–43)
MAGNESIUM: 2.1 MG/DL (ref 1.6–2.6)
MCH RBC QN AUTO: 30.6 PG (ref 25.2–33.5)
MCHC RBC AUTO-ENTMCNC: 31.4 G/DL (ref 28.4–34.8)
MCV RBC AUTO: 97.3 FL (ref 82.6–102.9)
MONOCYTES # BLD: 9 % (ref 3–12)
NRBC AUTOMATED: 0 PER 100 WBC
PDW BLD-RTO: 13.8 % (ref 11.8–14.4)
PLATELET # BLD: 361 K/UL (ref 138–453)
PLATELET ESTIMATE: ABNORMAL
PMV BLD AUTO: 10.9 FL (ref 8.1–13.5)
POTASSIUM SERPL-SCNC: 2.9 MMOL/L (ref 3.7–5.3)
RBC # BLD: 3.01 M/UL (ref 3.95–5.11)
RBC # BLD: ABNORMAL 10*6/UL
SEG NEUTROPHILS: 76 % (ref 36–65)
SEGMENTED NEUTROPHILS ABSOLUTE COUNT: 6.96 K/UL (ref 1.5–8.1)
SODIUM BLD-SCNC: 135 MMOL/L (ref 135–144)
WBC # BLD: 9.1 K/UL (ref 3.5–11.3)
WBC # BLD: ABNORMAL 10*3/UL

## 2020-11-04 PROCEDURE — 99233 SBSQ HOSP IP/OBS HIGH 50: CPT | Performed by: INTERNAL MEDICINE

## 2020-11-04 PROCEDURE — 2580000003 HC RX 258: Performed by: STUDENT IN AN ORGANIZED HEALTH CARE EDUCATION/TRAINING PROGRAM

## 2020-11-04 PROCEDURE — 6370000000 HC RX 637 (ALT 250 FOR IP): Performed by: FAMILY MEDICINE

## 2020-11-04 PROCEDURE — 6370000000 HC RX 637 (ALT 250 FOR IP): Performed by: STUDENT IN AN ORGANIZED HEALTH CARE EDUCATION/TRAINING PROGRAM

## 2020-11-04 PROCEDURE — 6360000002 HC RX W HCPCS: Performed by: NURSE PRACTITIONER

## 2020-11-04 PROCEDURE — 51798 US URINE CAPACITY MEASURE: CPT

## 2020-11-04 PROCEDURE — 2500000003 HC RX 250 WO HCPCS: Performed by: STUDENT IN AN ORGANIZED HEALTH CARE EDUCATION/TRAINING PROGRAM

## 2020-11-04 PROCEDURE — 80048 BASIC METABOLIC PNL TOTAL CA: CPT

## 2020-11-04 PROCEDURE — 6370000000 HC RX 637 (ALT 250 FOR IP): Performed by: INTERNAL MEDICINE

## 2020-11-04 PROCEDURE — 94640 AIRWAY INHALATION TREATMENT: CPT

## 2020-11-04 PROCEDURE — 99232 SBSQ HOSP IP/OBS MODERATE 35: CPT | Performed by: INTERNAL MEDICINE

## 2020-11-04 PROCEDURE — 97530 THERAPEUTIC ACTIVITIES: CPT

## 2020-11-04 PROCEDURE — 2700000000 HC OXYGEN THERAPY PER DAY

## 2020-11-04 PROCEDURE — 36415 COLL VENOUS BLD VENIPUNCTURE: CPT

## 2020-11-04 PROCEDURE — 83735 ASSAY OF MAGNESIUM: CPT

## 2020-11-04 PROCEDURE — 85025 COMPLETE CBC W/AUTO DIFF WBC: CPT

## 2020-11-04 PROCEDURE — 51701 INSERT BLADDER CATHETER: CPT

## 2020-11-04 PROCEDURE — 6360000002 HC RX W HCPCS: Performed by: STUDENT IN AN ORGANIZED HEALTH CARE EDUCATION/TRAINING PROGRAM

## 2020-11-04 PROCEDURE — 1200000000 HC SEMI PRIVATE

## 2020-11-04 PROCEDURE — 99223 1ST HOSP IP/OBS HIGH 75: CPT | Performed by: FAMILY MEDICINE

## 2020-11-04 RX ORDER — POTASSIUM CHLORIDE 20 MEQ/1
40 TABLET, EXTENDED RELEASE ORAL PRN
Status: DISCONTINUED | OUTPATIENT
Start: 2020-11-04 | End: 2020-11-06 | Stop reason: HOSPADM

## 2020-11-04 RX ORDER — FUROSEMIDE 20 MG/1
20 TABLET ORAL DAILY
Status: DISCONTINUED | OUTPATIENT
Start: 2020-11-05 | End: 2020-11-06 | Stop reason: HOSPADM

## 2020-11-04 RX ORDER — FAMOTIDINE 20 MG/1
20 TABLET, FILM COATED ORAL 2 TIMES DAILY
Status: DISCONTINUED | OUTPATIENT
Start: 2020-11-04 | End: 2020-11-06 | Stop reason: HOSPADM

## 2020-11-04 RX ORDER — TAMSULOSIN HYDROCHLORIDE 0.4 MG/1
0.4 CAPSULE ORAL DAILY
Status: DISCONTINUED | OUTPATIENT
Start: 2020-11-04 | End: 2020-11-06 | Stop reason: HOSPADM

## 2020-11-04 RX ORDER — MAGNESIUM SULFATE 1 G/100ML
1 INJECTION INTRAVENOUS PRN
Status: DISCONTINUED | OUTPATIENT
Start: 2020-11-04 | End: 2020-11-06 | Stop reason: HOSPADM

## 2020-11-04 RX ORDER — POTASSIUM CHLORIDE 7.45 MG/ML
10 INJECTION INTRAVENOUS PRN
Status: DISCONTINUED | OUTPATIENT
Start: 2020-11-04 | End: 2020-11-06 | Stop reason: HOSPADM

## 2020-11-04 RX ADMIN — FAMOTIDINE 20 MG: 10 INJECTION INTRAVENOUS at 09:23

## 2020-11-04 RX ADMIN — ENOXAPARIN SODIUM 70 MG: 80 INJECTION SUBCUTANEOUS at 09:24

## 2020-11-04 RX ADMIN — LEVOTHYROXINE SODIUM 150 MCG: 75 TABLET ORAL at 05:47

## 2020-11-04 RX ADMIN — SPIRONOLACTONE 25 MG: 25 TABLET ORAL at 09:38

## 2020-11-04 RX ADMIN — SODIUM CHLORIDE, PRESERVATIVE FREE 10 ML: 5 INJECTION INTRAVENOUS at 00:00

## 2020-11-04 RX ADMIN — IPRATROPIUM BROMIDE AND ALBUTEROL SULFATE 1 AMPULE: .5; 3 SOLUTION RESPIRATORY (INHALATION) at 09:01

## 2020-11-04 RX ADMIN — TAMSULOSIN HYDROCHLORIDE 0.4 MG: 0.4 CAPSULE ORAL at 13:01

## 2020-11-04 RX ADMIN — STANDARDIZED SENNA CONCENTRATE AND DOCUSATE SODIUM 2 TABLET: 8.6; 5 TABLET ORAL at 09:23

## 2020-11-04 RX ADMIN — IPRATROPIUM BROMIDE AND ALBUTEROL SULFATE 1 AMPULE: .5; 3 SOLUTION RESPIRATORY (INHALATION) at 12:15

## 2020-11-04 RX ADMIN — METOPROLOL TARTRATE 25 MG: 25 TABLET ORAL at 09:23

## 2020-11-04 RX ADMIN — MIDODRINE HYDROCHLORIDE 10 MG: 5 TABLET ORAL at 09:23

## 2020-11-04 RX ADMIN — PREGABALIN 300 MG: 100 CAPSULE ORAL at 21:10

## 2020-11-04 RX ADMIN — IPRATROPIUM BROMIDE AND ALBUTEROL SULFATE 1 AMPULE: .5; 3 SOLUTION RESPIRATORY (INHALATION) at 21:47

## 2020-11-04 RX ADMIN — ENOXAPARIN SODIUM 70 MG: 80 INJECTION SUBCUTANEOUS at 21:11

## 2020-11-04 RX ADMIN — FUROSEMIDE 20 MG: 10 INJECTION, SOLUTION INTRAMUSCULAR; INTRAVENOUS at 09:23

## 2020-11-04 RX ADMIN — SODIUM CHLORIDE, PRESERVATIVE FREE 10 ML: 5 INJECTION INTRAVENOUS at 20:00

## 2020-11-04 RX ADMIN — AMIODARONE HYDROCHLORIDE 200 MG: 200 TABLET ORAL at 21:10

## 2020-11-04 RX ADMIN — SODIUM CHLORIDE, PRESERVATIVE FREE 10 ML: 5 INJECTION INTRAVENOUS at 09:24

## 2020-11-04 RX ADMIN — FAMOTIDINE 20 MG: 20 TABLET, FILM COATED ORAL at 21:10

## 2020-11-04 RX ADMIN — POTASSIUM CHLORIDE 10 MEQ: 7.46 INJECTION, SOLUTION INTRAVENOUS at 09:17

## 2020-11-04 RX ADMIN — AMIODARONE HYDROCHLORIDE 200 MG: 200 TABLET ORAL at 09:23

## 2020-11-04 RX ADMIN — ASPIRIN 81 MG: 81 TABLET, CHEWABLE ORAL at 09:23

## 2020-11-04 RX ADMIN — MIDODRINE HYDROCHLORIDE 10 MG: 5 TABLET ORAL at 13:01

## 2020-11-04 RX ADMIN — SODIUM CHLORIDE, PRESERVATIVE FREE 10 ML: 5 INJECTION INTRAVENOUS at 09:18

## 2020-11-04 RX ADMIN — IPRATROPIUM BROMIDE AND ALBUTEROL SULFATE 1 AMPULE: .5; 3 SOLUTION RESPIRATORY (INHALATION) at 15:23

## 2020-11-04 RX ADMIN — PREGABALIN 300 MG: 100 CAPSULE ORAL at 09:22

## 2020-11-04 RX ADMIN — METOPROLOL TARTRATE 25 MG: 25 TABLET ORAL at 21:10

## 2020-11-04 RX ADMIN — MIDODRINE HYDROCHLORIDE 10 MG: 5 TABLET ORAL at 17:43

## 2020-11-04 ASSESSMENT — ENCOUNTER SYMPTOMS
DIARRHEA: 0
BLOOD IN STOOL: 0
RHINORRHEA: 0
NAUSEA: 0
CONSTIPATION: 0
WHEEZING: 0
CHEST TIGHTNESS: 0
VOMITING: 0
ABDOMINAL PAIN: 0
COUGH: 0
SHORTNESS OF BREATH: 0

## 2020-11-04 ASSESSMENT — PAIN SCALES - GENERAL
PAINLEVEL_OUTOF10: 0
PAINLEVEL_OUTOF10: 0

## 2020-11-04 NOTE — PLAN OF CARE
BRONCHOSPASM/BRONCHOCONSTRICTION     [x]         IMPROVE AERATION/BREATH SOUNDS  [x]   ADMINISTER BRONCHODILATOR THERAPY AS APPROPRIATE  [x]   ASSESS BREATH SOUNDS  [x]   IMPLEMENT AEROSOL/MDI PROTOCOL  [x]   PATIENT EDUCATION AS NEEDED     Problem: Skin Integrity:  Goal: Will show no infection signs and symptoms  Description: Will show no infection signs and symptoms  Outcome: Ongoing     Problem: Skin Integrity:  Goal: Absence of new skin breakdown  Description: Absence of new skin breakdown  Outcome: Ongoing     Problem: OXYGENATION/RESPIRATORY FUNCTION  Goal: Patient will maintain patent airway  Outcome: Ongoing     Problem: OXYGENATION/RESPIRATORY FUNCTION  Goal: Patient will achieve/maintain normal respiratory rate/effort  Description: Respiratory rate and effort will be within normal limits for the patient  Outcome: Ongoing

## 2020-11-04 NOTE — PROGRESS NOTES
Physical Therapy  Facility/Department: [facility]  Daily Treatment Note  NAME: Sergoi Branch  : 1945  MRN: [id number]    Date of Service: 2020    Discharge Recommendations:  Patient would benefit from continued therapy after discharge   Assessment   Assessment: Patient tolerated treatment well. Pt performed bed mobility mod (I). Pt required modAx2 for transfers and gait (10') with RW with several v/c's for correct hand placement and to maintain walker in correct position. Pt had a tendency of leaning backwards while ambulating. At this time, pt would not be safe to return home. PT Education: Goals;PT Role;General Safety;Gait Training;Transfer Training  REQUIRES PT FOLLOW UP: Yes  Activity Tolerance  Activity Tolerance: Patient Tolerated treatment well     Patient Diagnosis(es): There were no encounter diagnoses. has a past medical history of COPD (chronic obstructive pulmonary disease) ([facility] [address] Depression, GERD (gastroesophageal reflux disease), and Osteoporosis. has a past surgical history that includes Hysterectomy; back surgery; Breast surgery; Carpal tunnel release; joint replacement; joint replacement; fracture surgery (Left, 2018); and Wrist fracture surgery (Left, 2018). Restrictions  Restrictions/Precautions  Restrictions/Precautions: General Precautions, Fall Risk, Up as Tolerated  Required Braces or Orthoses?: No  Position Activity Restriction  Other position/activity restrictions: 5L O2 via nasal cannula  Subjective   General  Chart Reviewed: Yes  Response To Previous Treatment: Patient with no complaints from previous session. Family / Caregiver Present: Yes()  Pain Screening  Patient Currently in Pain: No  Orientation  Orientation  Overall Orientation Status: Within Functional Limits  Objective   Bed mobility  Supine to Sit: Modified independent  Comment: HOB elevated 30 degrees  Transfers  Sit to Stand:  Moderate Assistance;2 Person Assistance  Stand

## 2020-11-04 NOTE — H&P
[facility]  Office: [address], DO, Angela Michel, DO, Barajas Shown, DO, Tysongiannie Remed[employer] Blood, DO, Rubin Dias MD, Tai Mayfield MD, Francisco Hudson MD, Aylin Gilbert MD, Josesito Black MD, Yoni Tolentino MD, Chris Maravilla MD, Coral Gómez MD, Akash Timmons MD, Radha Melgar, DO, Chandni Guzman MD, Mariam Michael MD, Adilene Shukla DO, Chasity Reid MD,  Altaf Hwang, DO, Cynthia Stevenson MD, Dragan Meyer MD, Graciela Meek [facility], [facility], CNP, Stephen Beavers, CNP, Shana Hinton, CNS, Abimael Reed, CNP, Leticia Mary, CNP, Willem Diaz, CNP, Ricardo Soto, CNP, Neelam Santos, CNP, Rachel Braswell PA-C, Humberto Meredith, [facility], Hilton Benítez, CNP, Heriberto Vivar, CNP, Isaura Moreau, CNP, Justyna Gutierrez, CNP, Sindhu [facility], [address] [place] [address]      HISTORY AND PHYSICAL EXAMINATION            Date:   11/4/2020  Patient name:  Dina Wade  Date of admission:  10/22/2020  6:58 PM  MRN:   [id number]  Account:  [de-identified]  YOB: 1945  PCP:    King Lowery MD  Room:   [address] [place] [address]  Code Status:    Full Code    Chief Complaint:     Pneumonia     History Obtained From:     patient, electronic medical record    History of Present Illness: The patient is a 77 y.o. Non-/non  female who presents with No chief complaint on file. and she is admitted to the hospital for the management of  Severe sepsis ([facility] [address] Patient came to hospital with altered mental status. She was found to have sepsis secondary to bilateral pneumonia. Patient was started on Rocephin and azithromycin. Pneumonia was complicated by acute respiratory failure and treated with BiPAP. Patient has underlying history of COPD depression, GERD, osteoporosis. Patient had worsening of respiratory failure with hypoxia and hypercarbia requiring intubation. Follow-up chest x-ray showed pulmonary edema and very high proBNP.   Patient had echocardiogram with reduced ejection fraction 25 to 30%. Patient had cardiac cath on 11/26/2020 and was found to have mild CAD suggestive of nonischemic cardiomyopathy likely from doctors. She also had new onset A. fib with RVR on 10/23/2020. Patient was treated with Lopressor, amiodarone. Patient had shock likely septic versus cardiogenic and was treated with pressors, fluids. Midodrine was started. Sputum culture grew Klebsiella pneumoniae on 10/25/2020. Patient was extubated on 11/2/2020. Rocephin was stopped on 11/3/2020 after completing treatment course. Patient is having urinary retention and has required straight cath placement . Past Medical History:     Past Medical History:   Diagnosis Date    COPD (chronic obstructive pulmonary disease) ([facility] [address]     Depression     GERD (gastroesophageal reflux disease)     Osteoporosis         Past Surgical History:     Past Surgical History:   Procedure Laterality Date    BACK SURGERY      BREAST SURGERY      CARPAL TUNNEL RELEASE      FRACTURE SURGERY Left 12/20/2018    ORIF wrist    HYSTERECTOMY      JOINT REPLACEMENT      right knee    JOINT REPLACEMENT      WRIST FRACTURE SURGERY Left 12/20/2018    WRIST OPEN REDUCTION INTERNAL FIXATION-DISTAL RADIUS performed by Parvin Gilman MD at [address]        Medications Prior to Admission:     Prior to Admission medications    Medication Sig Start Date End Date Taking? Authorizing Provider   furosemide (LASIX) 40 MG tablet Take 1 tablet by mouth as needed (edema) 8/21/20   RYAN Donaldson CNP   Hydroxychloroquine Sulfate (PLAQUENIL PO) Take by mouth    Historical Provider, MD   oxyCODONE-acetaminophen (PERCOCET) 5-325 MG per tablet Take 1 tablet by mouth every 4 hours as needed for Pain.     Historical Provider, MD   NONFORMULARY by Intrathecal route continuous Bupivicaine 5.593 mg/day  Hydromorphone 2.797 mg/day  Refilled 10.06.2020 by Caro Hirsch CNP 10/6/20 12/9/20  RYAN Owens CNP   albuterol sulfate  (90 Base) MCG/ACT inhaler Inhale 2 puffs into the lungs 4 times daily 10/8/18   Marlon Santos PA-C   levothyroxine (SYNTHROID) 150 MCG tablet Take 150 mcg by mouth Daily    Historical Provider, MD   aspirin 81 MG tablet Take 81 mg by mouth daily    Historical Provider, MD   DULoxetine (CYMBALTA) 60 MG extended release capsule Take 60 mg by mouth daily    Historical Provider, MD   traZODone (DESYREL) 100 MG tablet Take 200 mg by mouth nightly     Historical Provider, MD   pantoprazole sodium (PROTONIX) 40 MG PACK packet Take 40 mg by mouth 2 times daily (before meals)    Historical Provider, MD   potassium chloride (KLOR-CON) 20 MEQ packet Take 20 mEq by mouth 2 times daily    Historical Provider, MD   pregabalin (LYRICA) 100 MG capsule Take 300 mg by mouth 2 times daily. Van King Historical Provider, MD   calcium citrate-vitamin D (CITRICAL + D) 315-250 MG-UNIT TABS per tablet Take 1 tablet by mouth 2 times daily (with meals)    Historical Provider, MD   HYDROcodone-acetaminophen (NORCO)  MG per tablet Take 1 tablet by mouth every 8 hours as needed for Pain. Vna King Historical Provider, MD   alendronate (FOSAMAX) 70 MG tablet Take 70 mg by mouth every 7 days    Historical Provider, MD        Allergies:     Patient has no known allergies. Social History:     Tobacco:    reports that she quit smoking about 44 years ago. She has never used smokeless tobacco.  Alcohol:      reports no history of alcohol use. Drug Use:  reports no history of drug use. Family History:     No family history on file. Review of Systems:     Positive and Negative as described in HPI. Review of Systems   Constitutional: Positive for activity change and fatigue. Negative for appetite change, fever and unexpected weight change. HENT: Negative for congestion, rhinorrhea and sneezing. Eyes: Negative for visual disturbance. Respiratory: Negative for cough, chest tightness, shortness of breath and wheezing.     Cardiovascular: Negative for chest pain and palpitations. Gastrointestinal: Negative for abdominal pain, blood in stool, constipation, diarrhea, nausea and vomiting. Genitourinary: Positive for difficulty urinating. Negative for dysuria, enuresis, frequency and hematuria. Musculoskeletal: Negative for arthralgias and myalgias. Skin: Negative for rash. Neurological: Negative for dizziness, weakness, light-headedness and headaches. Hematological: Does not bruise/bleed easily. Psychiatric/Behavioral: Negative for dysphoric mood and sleep disturbance. Physical Exam:   /62   Pulse 61   Temp 97.7 °F (36.5 °C) (Axillary)   Resp 16   Ht 5' 4\" (1.626 m)   Wt 155 lb 13.8 oz (70.7 kg)   SpO2 95%   BMI 26.75 kg/m²   Temp (24hrs), Av.7 °F (36.5 °C), Min:97.2 °F (36.2 °C), Max:98.2 °F (36.8 °C)    Recent Labs     20  0819 20  0905 20  1204 20  1916   POCGLU 74 137* 94 164*       Intake/Output Summary (Last 24 hours) at 2020 0732  Last data filed at 2020 0604  Gross per 24 hour   Intake 164 ml   Output 1625 ml   Net -1461 ml     Physical Exam  Vitals signs and nursing note reviewed. Constitutional:       General: She is not in acute distress. Appearance: She is ill-appearing. She is not diaphoretic. Interventions: Nasal cannula in place. HENT:      Head: Normocephalic and atraumatic. Nose:      Right Sinus: No maxillary sinus tenderness or frontal sinus tenderness. Left Sinus: No maxillary sinus tenderness or frontal sinus tenderness. Mouth/Throat:      Pharynx: No oropharyngeal exudate. Eyes:      General: No scleral icterus. Conjunctiva/sclera: Conjunctivae normal.      Pupils: Pupils are equal, round, and reactive to light. Neck:      Musculoskeletal: Full passive range of motion without pain and neck supple. Thyroid: No thyromegaly. Vascular: No JVD. Cardiovascular:      Rate and Rhythm: Normal rate and regular rhythm.       Pulses: 5:18 AM   Result Value Ref Range    WBC 9.1 3.5 - 11.3 k/uL    RBC 3.01 (L) 3.95 - 5.11 m/uL    Hemoglobin 9.2 (L) 11.9 - 15.1 g/dL    Hematocrit 29.3 (L) 36.3 - 47.1 %    MCV 97.3 82.6 - 102.9 fL    MCH 30.6 25.2 - 33.5 pg    MCHC 31.4 28.4 - 34.8 g/dL    RDW 13.8 11.8 - 14.4 %    Platelets 485 032 - 629 k/uL    MPV 10.9 8.1 - 13.5 fL    NRBC Automated 0.0 0.0 per 100 WBC    Differential Type NOT REPORTED     Seg Neutrophils 76 (H) 36 - 65 %    Lymphocytes 12 (L) 24 - 43 %    Monocytes 9 3 - 12 %    Eosinophils % 2 1 - 4 %    Basophils 0 0 - 2 %    Immature Granulocytes 1 (H) 0 %    Segs Absolute 6.96 1.50 - 8.10 k/uL    Absolute Lymph # 1.10 1.10 - 3.70 k/uL    Absolute Mono # 0.81 0.10 - 1.20 k/uL    Absolute Eos # 0.14 0.00 - 0.44 k/uL    Basophils Absolute <0.03 0.00 - 0.20 k/uL    Absolute Immature Granulocyte 0.07 0.00 - 0.30 k/uL    WBC Morphology NOT REPORTED     RBC Morphology NOT REPORTED     Platelet Estimate NOT REPORTED    Magnesium    Collection Time: 11/04/20  5:18 AM   Result Value Ref Range    Magnesium 2.1 1.6 - 2.6 mg/dL       Imaging/Diagonstics:    Xr Abdomen (kub) (single Ap View)    Result Date: 11/2/2020  Gaseous distention of the colon suggesting colonic ileus. Distal large bowel obstruction not excluded. Ct Head Wo Contrast    Result Date: 10/31/2020  No acute intracranial abnormality. MRI may be obtained if clinically indicated. Xr Chest Portable    Result Date: 10/30/2020  1. Endotracheal and enteric tubes remain in place. Interval right IJ central venous catheter with distal tip at the mid SVC. 2.  Diffuse hazy airspace and interstitial opacities in the lungs are slightly increased when compared to the radiograph performed earlier this morning. Xr Chest Portable    Result Date: 10/30/2020  1. Endotracheal tube remains in appropriate position. 2. Bilateral airspace disease again noted, although less prominent. Cardiac cath on 11/26/2020-mild CAD.   Assessment :

## 2020-11-04 NOTE — PROGRESS NOTES
[facility] - Progress Note    Today's Date and Time: 11/4/2020, 2:28 PM    Impression :   · Shock :septic Vs cardiogenic: resolved. · Bandemia  · CHF with elevated ProBNP  · Pulmonary edema  · Superimposed pneumonia; sputum cultures 10/25/20 grew Klebsiella Pneumoniae moderate growth  · COPD  · Elevated Troponins  · Hypothyroidism    Recommendations:     · Rocephin 2 gm IV q 24 hr (start date 10/23). Stop 11-3-20  · D/C Zithromax 500 mg IV q 24 hr (start date 10/23. Stop date 10-27-20)    Medical Decision Making/Summary/Discussion:   · Patient with CHF  · Rapid deterioration with acute hypoxia and hypercarbia requiring intubation  · CXR with rapid fluid shifts suggesting pulmonary edema. Very high ProBNP  · Can not exclude associated pneumonia at this stage. Pro calcitonin elevated    Infection Control Recommendations   · Christine Precautions    Antimicrobial Stewardship Recommendations   · Simplification of therapy  · Targeted therapy    Coordination of Outpatient Care:   · Estimated Length of IV antimicrobials:TBD  · Patient will need Midline Catheter Insertion: No  · Patient will need PICC line Insertion:Yes  · Patient will need: Home IV , [place],  SNF,  LTAC: TBD  · Patient will need outpatient wound care:No    Chief complaint/reason for consultation:   · Sepsis secondary to pneumonia    History of Present Illness:   Laura Miller is a 77y.o.-year-old  female who was initially admitted on 10/22/2020. Patient seen at the request of Dr. Nan Nguyen:  History was obtained from chart review and unobtainable from patient due to mental status. She has a history of hypothyroidism, COPD, iron deficiency anemia, presented to [facility] emergency department on 10/22 for difficulties breathing. Found to have pneumonia, and started on the sepsis protocol. At [facility]. V's, she is unable to provide much history as she is somnolent, but does follow commands. per EMR review became on the morning of 10/22, prompting her emergency department visit. There they performed a chest x-ray as well as CT scan which was negative for any pulmonary embolism but did show bilateral infiltrates. Found to have a lactic acidosis as well, and started on Rocephin and azithromycin. Initially hypercapnic, her mentation improved on BiPAP on 10/22. CURRENT EVALUATION: 11/04/20     Afebrile  VS stable    She was extubated on 11/2/20. At present, she is on nasal canula 5-->3 LO2. Transferred out of ICU    She is lying on bed. Awake, alert, oriented. Denies fevers, chills. She is on IV 20 mg Lasix. Leukocytosis resolved. Completed Rocephin 2 gm IV q 24 hr (start date 10/23, stop 11-3-20). Gastroenterology:Severe constipation, one episode of vomiting yesterday morning. Tube feed on hold. Follow up on ABD Xray showed gaseous distension of colon suggesting colonic ileus. Severe constipation resolved today. on clear fluids. Pt is having paroxysmal A. Fib with RVR . Currently in sinus rhythm. CXR:  · 10/24 increased bilat infiltrates  · 10/26 shows bilateral upper and lower lobe infiltrates unchanged from previous x-ray. · 10-27-20: shows coarse diffuse bilateral infiltrates with superimposed haziness, worse than yesterday. Patient shows fluid shifts. · 10-28-20: Multifocal interstitial and consolidative infiltrates seen throughout the lungs bilaterally, with worsening consolidation in the right lung base.    · 10-30-20: Bilateral airspace disease again noted, although less prominent    Labs:  BUN: 15>14>19>24>23>25>21->25  Creatinine: 0.37>0.47>0.52>0.48>0.45->0.36    WBC: 13.7 >14.2>9.7>6.5>9.9>8.6>8.9>10.7->9.7  Hgb: 12 >10.5 >11.3>10.9>10.6>10.4-->9.6>9.8->10.3  Platelet: 573 >628 >704>263>645>604>426-->884>607->246    Lactic acid 6.2 > 2.9 > 2.0>1.7>5>0.53    Cultures:  Urine:  ·   Blood:  · 10/22/20: No growth   Sputum :  · 10/24/20: Klebsiella pneumoniae moderate growth. Wound:     CSF         Discussed with patient, RN,     I have personally reviewed the past medical history, past surgical history, medications, social history, and family history, and I have updated the database accordingly.   Past Medical History:     Past Medical History:   Diagnosis Date    COPD (chronic obstructive pulmonary disease) ([facility] [address]     Depression     GERD (gastroesophageal reflux disease)     Osteoporosis        Past Surgical  History:     Past Surgical History:   Procedure Laterality Date    BACK SURGERY      BREAST SURGERY      CARPAL TUNNEL RELEASE      FRACTURE SURGERY Left 12/20/2018    ORIF wrist    HYSTERECTOMY      JOINT REPLACEMENT      right knee    JOINT REPLACEMENT      WRIST FRACTURE SURGERY Left 12/20/2018    WRIST OPEN REDUCTION INTERNAL FIXATION-DISTAL RADIUS performed by Ezequiel Alegre MD at [address]       Medications:      tamsulosin  0.4 mg Oral Daily    famotidine  20 mg Oral BID    ipratropium-albuterol  1 ampule Inhalation 4x daily    metoprolol tartrate  25 mg Oral BID    sennosides-docusate sodium  2 tablet Oral Daily    amiodarone  200 mg Oral BID    furosemide  20 mg Intravenous Daily    midodrine  10 mg Oral TID WC    spironolactone  25 mg Oral Daily    polyethylene glycol  17 g Oral Daily    enoxaparin  1 mg/kg Subcutaneous BID    sodium chloride flush  10 mL Intravenous 2 times per day    aspirin  81 mg Oral Daily    anesthetic and narcotic custom mixture   Intrathecal Once    levothyroxine  150 mcg Oral Daily    pregabalin  300 mg Oral BID    sodium chloride flush  10 mL Intravenous 2 times per day       Social History:     Social History     Socioeconomic History    Marital status:      Spouse name: Not on file    Number of children: Not on file    Years of education: Not on file    Highest education level: Not on file   Occupational History    Not on file   Social Needs    Financial resource strain: Not on file   [place] insecurity     Worry: Not on file     Inability: Not on file    Transportation needs     Medical: Not on file     Non-medical: Not on file   Tobacco Use    Smoking status: Former Smoker     Last attempt to quit: 1976     Years since quittin.0    Smokeless tobacco: Never Used   Substance and Sexual Activity    Alcohol use: No    Drug use: No    Sexual activity: Not on file   Lifestyle    Physical activity     Days per week: Not on file     Minutes per session: Not on file    Stress: Not on file   Relationships    Social connections     Talks on phone: Not on file     Gets together: Not on file     Attends Muslim service: Not on file     Active member of club or organization: Not on file     Attends meetings of clubs or organizations: Not on file     Relationship status: Not on file    Intimate partner violence     Fear of current or ex partner: Not on file     Emotionally abused: Not on file     Physically abused: Not on file     Forced sexual activity: Not on file   Other Topics Concern    Not on file   Social History Narrative    Not on file       Family History:   No family history on file. Allergies:   Patient has no known allergies. Review of Systems:   Review of Systems   Unable to perform ROS: Intubated          Physical Examination :     Patient Vitals for the past 8 hrs:   BP Temp Temp src Pulse Resp SpO2   20 1215 -- -- -- -- 16 --   20 0901 -- -- -- -- 18 --   20 0800 (!) 123/53 97.4 °F (36.3 °C) Oral 62 20 100 %     Physical Exam  Constitutional:       General: She is not in acute distress. Appearance: Normal appearance. She is obese. She is ill-appearing. HENT:      Head: Normocephalic and atraumatic. Nose: Nose normal. No congestion. Mouth/Throat:      Mouth: Mucous membranes are moist.   Eyes:      General: No scleral icterus. Conjunctiva/sclera: Conjunctivae normal.   Neck:      Musculoskeletal: Neck supple. No neck rigidity. Cardiovascular:      Rate and Rhythm: Normal rate and regular rhythm. Heart sounds: Normal heart sounds. No murmur. Pulmonary:      Effort: No respiratory distress. Breath sounds: Rales present. Abdominal:      General: There is no distension. Palpations: Abdomen is soft. Tenderness: There is no abdominal tenderness. Genitourinary:     Comments: Urine maral  R fem line in good condition  Musculoskeletal:         General: No swelling or tenderness. Skin:     Coloration: Skin is not jaundiced or pale. Neurological:      Comments: Sedated    Psychiatric:      Comments: Calm on the vent sedated          Medical Decision Making -Laboratory:   I have independently reviewed/ordered the following labs:    CBC with Differential:   Recent Labs     11/03/20 0620 11/04/20  0518   WBC 9.7 9.1   HGB 10.3* 9.2*   HCT 33.7* 29.3*    361   LYMPHOPCT 11* 12*   MONOPCT 7 9     BMP:   Recent Labs     11/03/20 0620 11/04/20  0518    135   K 3.5* 2.9*    95*   CO2 27 29   BUN 25* 17   CREATININE 0.36* 0.40*   MG 2.4 2.1     Hepatic Function Panel:   No results for input(s): PROT, LABALBU, BILIDIR, IBILI, BILITOT, ALKPHOS, ALT, AST in the last 72 hours. No results for input(s): RPR in the last 72 hours. No results for input(s): HIV in the last 72 hours. No results for input(s): BC in the last 72 hours.   Lab Results   Component Value Date    MUCUS NOT REPORTED 10/30/2020    RBC 3.01 11/04/2020    TRICHOMONAS NOT REPORTED 10/30/2020    WBC 9.1 11/04/2020    YEAST NOT REPORTED 10/30/2020    TURBIDITY TURBID 10/30/2020     Lab Results   Component Value Date    CREATININE 0.40 11/04/2020    GLUCOSE 88 11/04/2020       Medical Decision Making-Imaging:   10-30-20:      10 -28 -20:      10-27-20        10/23/20:            Barbara Thomas MD

## 2020-11-04 NOTE — CARE COORDINATION
Care Transitions    Predict tool uploaded in media    PER LEONORA EMAIL:   [facility] Predict Outcome report for Alan Gilman ,  1945 . Predict is recommending SNF with expected length of stay of 18 days, and projected CG hours of  5.75  post stay.    She would benefit from daily skilled nursing and therapy to increase strength and functional independence.     -RHEA Lynch, RN  Telephonic [facility] /[facility]  M: [phone]

## 2020-11-04 NOTE — PLAN OF CARE
Problem: Falls - Risk of:  Goal: Will remain free from falls  Description: Will remain free from falls  11/4/2020 0457 by Jake Malcolm RN  Outcome: Met This Shift  Note: Patient's bed remained locked and in lowest position throughout shift. Patient belonings and call light remain within reach. 2/4 side rails are up, and non-skid footwear is on. Problem: Pain:  Description: Pain management should include both nonpharmacologic and pharmacologic interventions.   Goal: Pain level will decrease  Description: Pain level will decrease  11/4/2020 0457 by Jake Malcolm RN  Outcome: Met This Shift  Electronically signed by Jake Malcolm RN on 11/4/2020 at 5:09 AM

## 2020-11-04 NOTE — PROGRESS NOTES
Physical Therapy  DATE: 2020    NAME: Geovanni Gardner  MRN: [id number]   : 1945    Patient not seen this date for Physical Therapy due to:  [] Blood transfusion in progress  [] Hemodialysis  []  Patient Declined  [] Spine Precautions   [] Strict Bedrest  [] Surgery/ Procedure  [] Testing      [x] Other--just starting breathing treatment; unlikely to be able to return; ck later today or         [] PT being discontinued at this time. Patient independent. No further needs. [] PT being discontinued at this time as the patient has been transferred to palliative care. No further needs.     Fabian Cheung, PT

## 2020-11-04 NOTE — PROGRESS NOTES
PULMONARY & CRITICAL CARE MEDICINE PROGRESS  NOTE     Patient:  Anni Holt  MRN: [id number]  Admit date: 10/22/2020    SUBJECTIVE     I personally interviewed/examined the patient, reviewed interval history and interpreted all available radiographic, laboratory data at the time of service. Chief Compliant/Reason for Initial Consult:   Bilateral pneumonia, altered mentation    Brief Hospital Course     58-year-old female with a history of COPD, hypothyroidism, initially admitted on 10/22/2020 with acute on chronic hypoxic and hypercapnic respiratory failure due to bilateral pneumonia   Requiring intubation. She had prolonged ICU stay complicated with encephalopathy, septic shock requiring pressors. new onset systolic dysfunction with EF of 25 to 30% concerning for Takotsubo cardiomyopathy and suspected left LV thrombus, new onset atrial fibrillation requiring anticoagulation with Lovenox 70 twice daily for both A. fib and left LV thrombus. As per cardiology recommendation patient will need LifeVest prior to discharge. Extubated on 11/2/2020 without immediate complication, put on 5 L oxygen saturating 94%. Transferred out of ICU     Interval History:  Patient seen and examined bedside. Patient is currently saturating well on 3 L oxygen via nasal cannula. Denied any issues overnight.     Review of Systems:  General: negative for chills, fatigue or fever  Respiratory: positive for shortness of breath negative for cough  Cardiovascular: negative for edema or palpitations  Gastrointestinal: negative for abdominal pain, change in bowel habits or nausea/vomiting  Genito-Urinary: negative for dysuria or urinary frequency/urgency  Musculoskeletal: negative for joint pain or joint swelling  Neurological: negative for numbness/tingling, seizures or weakness  Dermatological: negative for pruritus or rash    OBJECTIVE     VITAL SIGNS:   LAST-  BP (!) polyethylene glycol  17 g Oral Daily    enoxaparin  1 mg/kg Subcutaneous BID    sodium chloride flush  10 mL Intravenous 2 times per day    aspirin  81 mg Oral Daily    anesthetic and narcotic custom mixture   Intrathecal Once    levothyroxine  150 mcg Oral Daily    pregabalin  300 mg Oral BID    sodium chloride flush  10 mL Intravenous 2 times per day     Continuous Infusions:    LABS:-  ABGs:   No results found for: PH, PCO2, PO2, HCO3, O2SAT  No results for input(s): PHART, PO2ART, QZV3MAP, NVQ4BVB, BEART, N0SEIRTQ in the last 72 hours. Lab Results   Component Value Date    POCPH 7.462 (H) 11/02/2020    POCPCO2 46.4 11/02/2020    POCPO2 69.8 (L) 11/02/2020    POCHCO3 33.2 (H) 11/02/2020    JACO9SAE 94 11/02/2020     CBC:   Recent Labs     11/02/20  0436 11/03/20  0620 11/04/20  0518   WBC 10.7 9.7 9.1   HGB 9.8* 10.3* 9.2*   HCT 31.4* 33.7* 29.3*   MCV 98.4 99.1 97.3    297 361   LYMPHOPCT 11* 11* 12*   RBC 3.19* 3.40* 3.01*   MCH 30.7 30.3 30.6   MCHC 31.2 30.6 31.4   RDW 14.3 14.1 13.8     BMP:   Recent Labs     11/02/20  0436 11/03/20  0620 11/04/20  0518    139 135   K 3.3* 3.5* 2.9*    101 95*   CO2 28 27 29   BUN 21 25* 17   CREATININE 0.45* 0.36* 0.40*   GLUCOSE 147* 88 88   PHOS 2.6 2.9  --      Liver Function Test:   No results for input(s): PROT, LABALBU, ALT, AST, GGT, ALKPHOS, BILITOT in the last 72 hours. Amylase/Lipase:  No results for input(s): AMYLASE, LIPASE in the last 72 hours. Coagulation Profile:   No results for input(s): INR, PROTIME, APTT in the last 72 hours. Cardiac Enzymes:  No results for input(s): CKTOTAL, CKMB, CKMBINDEX, TROPONINI in the last 72 hours.   Lactic Acid:  Lab Results   Component Value Date    LACTA 5.0 (H) 10/22/2020    LACTA 0.5 08/28/2019    LACTA 2.0 10/05/2018     BNP:   No results found for: BNP  D-Dimer:  No results found for: DDIMER  Others:   Lab Results   Component Value Date    TSH 0.69 10/24/2020     Lab Results   Component Value Date    DANE NEGATIVE 07/30/2020    SEDRATE 5 07/30/2020    CRP 2.6 07/30/2020     No results found for: Jami Schroeder  No results found for: IRON, TIBC, FERRITIN  No results found for: SPEP, UPEP  No results found for: PSA, CEA, , VT2292,     Input/Output:    Intake/Output Summary (Last 24 hours) at 11/4/2020 1629  Last data filed at 11/4/2020 1105  Gross per 24 hour   Intake 184 ml   Output 1500 ml   Net -1316 ml       Microbiology:    Pathology:    Radiology Reports:  XR ABDOMEN (KUB) (SINGLE AP VIEW)   Final Result   Gaseous distention of the colon suggesting colonic ileus. Distal large bowel   obstruction not excluded. CT HEAD WO CONTRAST   Final Result   No acute intracranial abnormality. MRI may be obtained if clinically   indicated. XR CHEST PORTABLE   Final Result   1. Endotracheal and enteric tubes remain in place. Interval right IJ   central venous catheter with distal tip at the mid SVC. 2.  Diffuse hazy airspace and interstitial opacities in the lungs are   slightly increased when compared to the radiograph performed earlier this   morning. XR CHEST PORTABLE   Final Result   1. Endotracheal tube remains in appropriate position. 2. Bilateral airspace disease again noted, although less prominent. XR CHEST PORTABLE   Final Result   Multifocal interstitial and consolidative infiltrates seen throughout the   lungs bilaterally, with worsening consolidation in the right lung base. XR CHEST PORTABLE   Final Result   1. Endotracheal tube terminates at the level of the alejandro and should be   pulled back approximately 2-3 cm.   2. Redemonstration of coarse, diffuse bilateral interstitial opacities with   slightly increased superimposed hazy airspace opacity, possibly edema or   worsening pneumonia.          XR CHEST PORTABLE   Final Result   Coarse bilateral airspace opacities within the upper and lower lobes, not   significantly changed, suggesting a multi lobar pneumonia. XR CHEST PORTABLE   Final Result   Partial clearing of bilateral lung opacities. XR CHEST PORTABLE   Final Result   Increased bilateral lung opacities. XR CHEST PORTABLE   Final Result   Bilateral lung opacities likely pneumonia. Enteric tube with the tip within the proximal stomach. Consider slightly   advancing. XR ABDOMEN FOR NG/OG/NE TUBE PLACEMENT   Final Result   Life-support devices as above. Similar-appearing diffuse bilateral heterogeneous opacities and multifocal   consolidations. XR CHEST PORTABLE   Final Result   Life-support devices as above. Similar-appearing diffuse bilateral heterogeneous opacities and multifocal   consolidations. XR CHEST PORTABLE   Final Result   No significant interval change in multifocal bilateral consolidation, most   concerning for pneumonia. XR CHEST PORTABLE   Final Result   No significant change in the multifocal airspace opacities worrisome for   multifocal pneumonia.              Echocardiogram:   Results for orders placed during the hospital encounter of 10/22/20   ECHO Complete 2D W Doppler W Color    Narrative Transthoracic Echocardiography Report (TTE)     Patient Name [facility]    Date of Study           10/23/2020                Ferny Bradshaw      Date of      1945  Gender                  Female   Birth      Age          77 year(s)  Race                          Room Number  0126        Height:                 64 inch, 162.56 cm      Corporate ID [id number]    Weight:                 157 pounds, 71.2 kg   #      Patient Acct [de-identified]   BSA:        1.76 m^2    BMI:        26.95 kg/m^2   #      MR #         [id number]     Panchitostalin Bobo      Accession #  [id number]  Interpreting Physician  [address] [place] [address]      Fellow                   Referring Nurse                            Practitioner      Interpreting             Referring Physician Nick Victoria,   Fellow                                           MD     Type of Study      TTE procedure:2D Echocardiogram, M-Mode, Doppler, Color Doppler. Procedure Date  Date: 10/23/2020 Start: 08:27 AM    Study Location: [facility]  Technical Quality: Good visualization    History / Tech. Comments:  Procedure explained to patient. Study done bedside in MICU. Assess EF, elevated troponins, Sepsis, COPD    Patient Status: Inpatient    Height: 64 inches Weight: 157 pounds BSA: 1.76 m^2 BMI: 26.95 kg/m^2    HR: 106 bpm    CONCLUSIONS    Summary  Left ventricle is normal in size. Global left ventricular systolic function  is severely reduced. Estimated ejection fraction is 25-30 % . Calculated EF via heart model is 30 %. The apex appears akinetic. Cannot exclude an apical thrombus, consider  Definity. Mild mitral regurgitation. Mild to moderate tricuspid regurgitation. Estimated right ventricular  systolic pressure is 35 mmHg. IVC dilated but unable to assess respiratory collapse due to patient on  ventilator. Signature  ----------------------------------------------------------------------------   Electronically signed by Kallie Mckeon(Sonographer) on 10/23/2020   12:02 PM  ----------------------------------------------------------------------------    ----------------------------------------------------------------------------   Electronically signed by Saeed Nam(Interpreting physician) on 10/23/2020   01:43 PM  ----------------------------------------------------------------------------  FINDINGS  Left Atrium  Left atrium is normal in size. Left Ventricle  Left ventricle is normal in size. Global left ventricular systolic function  is severely reduced. Estimated ejection fraction is 25-30 % . Calculated EF via heart model is 30 %. The apex appears akinetic. Cannot exclude an apical thrombus, consider  Definity. Normal left ventricular wall thickness.   Right Atrium  Right atrium is normal size . Right Ventricle  Normal right ventricle size and function. Mitral Valve  Normal mitral valve structure. Mild mitral regurgitation. No mitral stenosis. Aortic Valve  Aortic valve structure and function normal.  No aortic insufficiency. No aortic stenosis. Tricuspid Valve  Normal tricuspid valve leaflets. Mild to moderate tricuspid regurgitation. No tricuspid stenosis. Estimated right ventricular systolic pressure is 35 mmHg. Pulmonic Valve  Pulmonic valve not well visualized but Doppler velocities are normal.  No significant pulmonic insufficiency. No evidence of pulmonic stenosis. Pericardial Effusion  No pericardial effusion. Miscellaneous  Normal aortic root dimension. E/E' average = 10.0. IVC dilated but unable to assess respiratory collapse due to patient on  ventilator.     M-mode / 2D Measurements & Calculations:      LVIDd:3.3 cm(3.7 - 5.6 cm)       Diastolic PIMHOD:629 ml   LVIDs:3 cm(2.2 - 4.0 cm)         Systolic HKYDCW:24 ml   IVSd:1 cm(0.6 - 1.1 cm)          Aortic Root:2.8 cm(2.0 - 3.7 cm)   LVPWd:1 cm(0.6 - 1.1 cm)         LA Dimension: 2.6 cm(1.9 - 4.0 cm)   Fractional Shortenin.09 %     LA volume/Index: 46.5 ml /26m^2   Calculated LVEF (%): 30.84 %     LVOT:1.5 cm                                    RVDd:3.9 cm      Mitral:                                 Aortic      Valve Area (P1/2-Time): 5.12 cm^2       Peak Velocity: 0.96 m/s   Peak E-Wave: 0.78 m/s                   Mean Velocity: 0.69 m/s   Peak A-Wave: 0.55 m/s                   Peak Gradient: 3.68 mmHg   E/A Ratio: 1.43                         Mean Gradient: 2 mmHg   Peak Gradient: 2.46 mmHg   Mean Gradient: 1 mmHg   Deceleration Time: 127 msec             Area (continuity): 1.41 cm^2   P1/2t: 43 msec                          AV VTI: 16.5 cm      MR Velocity: 3.51 m/s   KIMBERLEY Volumetric: 0.34 cm^2   Area (continuity): 1.6 cm^2   Mean Velocity: 0.50 m/s   MR VTI: 105 cm      Tricuspid: Pulmonic:      Estimated RVSP: 35 mmHg                 Peak Velocity: 0.62 m/s   Peak TR Velocity: 2.24 m/s              Peak Gradient: 1.56 mmHg   Peak TR Gradient: 20.0704 mmHg   Estimated RA Pressure: 15 mmHg                                              Estimated PASP: 35.07 mmHg     Diastology / Tissue Doppler    Lateral Wall E' velocity:0.08 m/s  Lateral Wall E/E':10       Cardiac Catheterization:   No results found for this or any previous visit. ASSESSMENT AND PLAN     Assessment:    //Acute hypoxic and hypercapnic respiratory failure from bilateral pneumonia  Complicated by cardiogenic pulmonary edema  //New onset systolic failure EF 21-39%-QVPXTR Takotsubo cardiomyopathy  //New onset paroxysmal A. fib with RVR  //NSTEMI  //Shock septic versus cardiogenic  //Hypothyroidism    Plan:    Patient remains hemodynamically stable and is currently saturating well on 3 L nasal cannula   Continue supplemental oxygen to keep oxygen saturation greater than 92%  Continue nocturnal and as needed noninvasive mechanical ventilation (BiPAP)  -Continue bronchodilators DuoNeb 4 times, albuterol nebulization as needed. continue aspirin, Midrin, Lovenox 70 twice daily, Lasix 20 daily, Aldactone 25 daily  -Completed course of Rocephin and Zithromax.   -Continue incentive spirometry, pulmonary toilet, aspiration precautions and bronchodilators  Continue to monitor I/O with a goal of even/negative fluid balance    DVT and stress ulcer prophylaxis  Physical/occupational/speech therapy; increase activity as tolerated    I updated the patient regarding the current clinical condition, provisional diagnosis and management plan. I addressed concerns and answered all questions to the best of my abilities. It was my pleasure to evaluate Adryan Aguilera today. We will continue to follow. I would like to thank you for allowing me to participate in the care of this patient.   Please feel free to call with any further questions or concerns. Roseline Gould M.D. Pulmonary and Critical Care Medicine           11/4/2020, 4:29 PM    Please note that this chart was generated using voice recognition Dragon dictation software. Although every effort was made to ensure the accuracy of this automated transcription, some errors in transcription may have occurred. Attending Physician Statement  I have discussed the care of Cleopatra Whitfield, including pertinent history and exam findings with the resident. I have reviewed the key elements of all parts of the encounter with the resident. I have seen and examined the patient with the resident. I agree with the assessment and plan and status of the problem list as documented. I have seen the patient during my round today, chart reviewed, labs and medications reviewed, ICU events reviewed. This morning when I saw her she was hemodynamically stable she was on 4 to 5 L nasal cannula but she was comfortable not in distress arousable follows command cough is weak she was saturating 99% on 5 L. Bilateral breath sounds present mild rhonchi present decreased at bases. She is currently on Lasix 20 mg daily and her potassium is 2.9 BUN/creatinine is normal she is on amiodarone and she is on full dose of Lovenox 70 mg twice daily. She is on metoprolol and also on midodrine. She has history of pulmonary edema continue with Lasix monitor electrolytes and monitor intake and output. She has pulmonary edema in ICU her EF is 25 to 30% high risk for recurrent pulmonary edema. Continue with DuoNeb aerosol by decreasing to 3 times a day. Encourage incentive spirometry deep breathing cough. Wean O2 to keep saturation above 92%. Ambulate and physical therapy. Please note that this chart was generated using voice recognition Dragon dictation software.  Although every effort was made to ensure the accuracy of this automated transcription, some errors in transcription may have occurred.       Odilia Felder MD  11/4/2020 6:48 PM

## 2020-11-04 NOTE — CARE COORDINATION
Transitional planning. Spoke with pt and  and goal is to go home with home care. Writer gave list of [facility] [employer] but also states the pt is very weak and nursing staff along with PT input recommends SNF to get stronger before home. Both pt and  want to see if she gets stronger before making choice. Gave list of SNF's-they want to stay close to [place] if they end up with that plan. Praveena Mckee [address] from [place] calls and states pt still meets criteria if she gets D/C today or tomorrow. [address]quez calls to see if pt can go to [place]. PS MD to see if pt medically stable for D/C. If so, will talk to family. 1 Dr Serena Zhang states most likely tomorrow for D/C. Maggy Cifuentes called and he will see about bed availalility for AM and I told him to go ahead but I still need to talk with family. Spoke with pt and  and they said if she D/C tomorrow, her daughter will also be there 24/7 to help at home along with . She also has 2 sons plus their wives that live near by to help. They do not want SNF or [facility]. They did agree to home care and chose [facility]. Referral efaxed and spoke to JESSICA in admissions. Probable home O2-now on  3L/NC    80  and pt decide to go to SNF after PT session and choose [facility] [employer]. Referral faxed and spoke with Tim Sandoval . She will review and get back with KIERRA Powell at [place] aware and also Fran Yanez at  [facility] aware.

## 2020-11-05 LAB
ABSOLUTE EOS #: 0.1 K/UL (ref 0–0.44)
ABSOLUTE IMMATURE GRANULOCYTE: 0.07 K/UL (ref 0–0.3)
ABSOLUTE LYMPH #: 1.14 K/UL (ref 1.1–3.7)
ABSOLUTE MONO #: 0.85 K/UL (ref 0.1–1.2)
ANION GAP SERPL CALCULATED.3IONS-SCNC: 9 MMOL/L (ref 9–17)
BASOPHILS # BLD: 0 % (ref 0–2)
BASOPHILS ABSOLUTE: <0.03 K/UL (ref 0–0.2)
BUN BLDV-MCNC: 13 MG/DL (ref 8–23)
BUN/CREAT BLD: ABNORMAL (ref 9–20)
CALCIUM SERPL-MCNC: 9.1 MG/DL (ref 8.6–10.4)
CHLORIDE BLD-SCNC: 94 MMOL/L (ref 98–107)
CO2: 30 MMOL/L (ref 20–31)
CREAT SERPL-MCNC: 0.46 MG/DL (ref 0.5–0.9)
DIFFERENTIAL TYPE: ABNORMAL
EOSINOPHILS RELATIVE PERCENT: 1 % (ref 1–4)
GFR AFRICAN AMERICAN: >60 ML/MIN
GFR NON-AFRICAN AMERICAN: >60 ML/MIN
GFR SERPL CREATININE-BSD FRML MDRD: ABNORMAL ML/MIN/{1.73_M2}
GFR SERPL CREATININE-BSD FRML MDRD: ABNORMAL ML/MIN/{1.73_M2}
GLUCOSE BLD-MCNC: 92 MG/DL (ref 70–99)
HCT VFR BLD CALC: 30.7 % (ref 36.3–47.1)
HEMOGLOBIN: 9.7 G/DL (ref 11.9–15.1)
IMMATURE GRANULOCYTES: 1 %
LYMPHOCYTES # BLD: 14 % (ref 24–43)
MAGNESIUM: 2.1 MG/DL (ref 1.6–2.6)
MCH RBC QN AUTO: 30.5 PG (ref 25.2–33.5)
MCHC RBC AUTO-ENTMCNC: 31.6 G/DL (ref 28.4–34.8)
MCV RBC AUTO: 96.5 FL (ref 82.6–102.9)
MONOCYTES # BLD: 11 % (ref 3–12)
NRBC AUTOMATED: 0 PER 100 WBC
PDW BLD-RTO: 13.8 % (ref 11.8–14.4)
PLATELET # BLD: 370 K/UL (ref 138–453)
PLATELET ESTIMATE: ABNORMAL
PMV BLD AUTO: 10.5 FL (ref 8.1–13.5)
POTASSIUM SERPL-SCNC: 3.3 MMOL/L (ref 3.7–5.3)
RBC # BLD: 3.18 M/UL (ref 3.95–5.11)
RBC # BLD: ABNORMAL 10*6/UL
SEG NEUTROPHILS: 73 % (ref 36–65)
SEGMENTED NEUTROPHILS ABSOLUTE COUNT: 5.83 K/UL (ref 1.5–8.1)
SODIUM BLD-SCNC: 133 MMOL/L (ref 135–144)
WBC # BLD: 8 K/UL (ref 3.5–11.3)
WBC # BLD: ABNORMAL 10*3/UL

## 2020-11-05 PROCEDURE — 6370000000 HC RX 637 (ALT 250 FOR IP): Performed by: STUDENT IN AN ORGANIZED HEALTH CARE EDUCATION/TRAINING PROGRAM

## 2020-11-05 PROCEDURE — 83735 ASSAY OF MAGNESIUM: CPT

## 2020-11-05 PROCEDURE — 6360000002 HC RX W HCPCS: Performed by: STUDENT IN AN ORGANIZED HEALTH CARE EDUCATION/TRAINING PROGRAM

## 2020-11-05 PROCEDURE — 6370000000 HC RX 637 (ALT 250 FOR IP): Performed by: INTERNAL MEDICINE

## 2020-11-05 PROCEDURE — 1200000000 HC SEMI PRIVATE

## 2020-11-05 PROCEDURE — 99232 SBSQ HOSP IP/OBS MODERATE 35: CPT | Performed by: INTERNAL MEDICINE

## 2020-11-05 PROCEDURE — 80048 BASIC METABOLIC PNL TOTAL CA: CPT

## 2020-11-05 PROCEDURE — 97110 THERAPEUTIC EXERCISES: CPT

## 2020-11-05 PROCEDURE — 2700000000 HC OXYGEN THERAPY PER DAY

## 2020-11-05 PROCEDURE — 85025 COMPLETE CBC W/AUTO DIFF WBC: CPT

## 2020-11-05 PROCEDURE — 94640 AIRWAY INHALATION TREATMENT: CPT

## 2020-11-05 PROCEDURE — 36415 COLL VENOUS BLD VENIPUNCTURE: CPT

## 2020-11-05 PROCEDURE — 6370000000 HC RX 637 (ALT 250 FOR IP): Performed by: FAMILY MEDICINE

## 2020-11-05 PROCEDURE — 99239 HOSP IP/OBS DSCHRG MGMT >30: CPT | Performed by: FAMILY MEDICINE

## 2020-11-05 PROCEDURE — 2580000003 HC RX 258: Performed by: STUDENT IN AN ORGANIZED HEALTH CARE EDUCATION/TRAINING PROGRAM

## 2020-11-05 PROCEDURE — 97116 GAIT TRAINING THERAPY: CPT

## 2020-11-05 PROCEDURE — 97535 SELF CARE MNGMENT TRAINING: CPT

## 2020-11-05 RX ORDER — AMIODARONE HYDROCHLORIDE 200 MG/1
200 TABLET ORAL 2 TIMES DAILY
Qty: 30 TABLET | Refills: 3 | Status: SHIPPED | OUTPATIENT
Start: 2020-11-05 | End: 2020-12-07

## 2020-11-05 RX ORDER — MIDODRINE HYDROCHLORIDE 10 MG/1
10 TABLET ORAL
Qty: 90 TABLET | Refills: 3 | Status: SHIPPED | OUTPATIENT
Start: 2020-11-05 | End: 2021-05-20 | Stop reason: ALTCHOICE

## 2020-11-05 RX ORDER — SPIRONOLACTONE 25 MG/1
25 TABLET ORAL DAILY
Qty: 30 TABLET | Refills: 3 | Status: SHIPPED | OUTPATIENT
Start: 2020-11-06 | End: 2021-01-18 | Stop reason: ALTCHOICE

## 2020-11-05 RX ORDER — TAMSULOSIN HYDROCHLORIDE 0.4 MG/1
0.4 CAPSULE ORAL DAILY
Qty: 30 CAPSULE | Refills: 3 | Status: SHIPPED | OUTPATIENT
Start: 2020-11-06 | End: 2021-05-20 | Stop reason: ALTCHOICE

## 2020-11-05 RX ORDER — FUROSEMIDE 20 MG/1
20 TABLET ORAL DAILY
Qty: 60 TABLET | Refills: 3 | Status: SHIPPED | OUTPATIENT
Start: 2020-11-06 | End: 2020-11-05 | Stop reason: HOSPADM

## 2020-11-05 RX ORDER — BISACODYL 10 MG
10 SUPPOSITORY, RECTAL RECTAL DAILY PRN
Qty: 30 SUPPOSITORY | Refills: 0 | Status: SHIPPED | OUTPATIENT
Start: 2020-11-05 | End: 2020-12-05

## 2020-11-05 RX ORDER — OXYCODONE HYDROCHLORIDE AND ACETAMINOPHEN 5; 325 MG/1; MG/1
1 TABLET ORAL EVERY 4 HOURS PRN
Qty: 9 TABLET | Refills: 0 | Status: SHIPPED | OUTPATIENT
Start: 2020-11-05 | End: 2020-11-08

## 2020-11-05 RX ORDER — FUROSEMIDE 20 MG/1
20 TABLET ORAL DAILY
Qty: 60 TABLET | Refills: 3 | Status: SHIPPED | OUTPATIENT
Start: 2020-11-05 | End: 2020-12-07

## 2020-11-05 RX ADMIN — IPRATROPIUM BROMIDE AND ALBUTEROL SULFATE 1 AMPULE: .5; 3 SOLUTION RESPIRATORY (INHALATION) at 20:34

## 2020-11-05 RX ADMIN — MIDODRINE HYDROCHLORIDE 10 MG: 5 TABLET ORAL at 17:06

## 2020-11-05 RX ADMIN — AMIODARONE HYDROCHLORIDE 200 MG: 200 TABLET ORAL at 09:29

## 2020-11-05 RX ADMIN — SODIUM CHLORIDE, PRESERVATIVE FREE 10 ML: 5 INJECTION INTRAVENOUS at 21:06

## 2020-11-05 RX ADMIN — IPRATROPIUM BROMIDE AND ALBUTEROL SULFATE 1 AMPULE: .5; 3 SOLUTION RESPIRATORY (INHALATION) at 11:46

## 2020-11-05 RX ADMIN — LEVOTHYROXINE SODIUM 150 MCG: 75 TABLET ORAL at 06:04

## 2020-11-05 RX ADMIN — FUROSEMIDE 20 MG: 20 TABLET ORAL at 09:29

## 2020-11-05 RX ADMIN — ENOXAPARIN SODIUM 70 MG: 80 INJECTION SUBCUTANEOUS at 09:30

## 2020-11-05 RX ADMIN — METOPROLOL TARTRATE 25 MG: 25 TABLET ORAL at 09:29

## 2020-11-05 RX ADMIN — MIDODRINE HYDROCHLORIDE 10 MG: 5 TABLET ORAL at 13:07

## 2020-11-05 RX ADMIN — PREGABALIN 300 MG: 100 CAPSULE ORAL at 21:06

## 2020-11-05 RX ADMIN — IPRATROPIUM BROMIDE AND ALBUTEROL SULFATE 1 AMPULE: .5; 3 SOLUTION RESPIRATORY (INHALATION) at 09:17

## 2020-11-05 RX ADMIN — ASPIRIN 81 MG: 81 TABLET, CHEWABLE ORAL at 09:29

## 2020-11-05 RX ADMIN — SODIUM CHLORIDE, PRESERVATIVE FREE 10 ML: 5 INJECTION INTRAVENOUS at 21:07

## 2020-11-05 RX ADMIN — METOPROLOL TARTRATE 25 MG: 25 TABLET ORAL at 21:06

## 2020-11-05 RX ADMIN — SPIRONOLACTONE 25 MG: 25 TABLET ORAL at 09:29

## 2020-11-05 RX ADMIN — ENOXAPARIN SODIUM 70 MG: 80 INJECTION SUBCUTANEOUS at 21:07

## 2020-11-05 RX ADMIN — IPRATROPIUM BROMIDE AND ALBUTEROL SULFATE 1 AMPULE: .5; 3 SOLUTION RESPIRATORY (INHALATION) at 15:21

## 2020-11-05 RX ADMIN — AMIODARONE HYDROCHLORIDE 200 MG: 200 TABLET ORAL at 21:07

## 2020-11-05 RX ADMIN — FAMOTIDINE 20 MG: 20 TABLET, FILM COATED ORAL at 21:06

## 2020-11-05 RX ADMIN — FAMOTIDINE 20 MG: 20 TABLET, FILM COATED ORAL at 09:29

## 2020-11-05 RX ADMIN — MIDODRINE HYDROCHLORIDE 10 MG: 5 TABLET ORAL at 09:29

## 2020-11-05 RX ADMIN — SODIUM CHLORIDE, PRESERVATIVE FREE 10 ML: 5 INJECTION INTRAVENOUS at 09:32

## 2020-11-05 RX ADMIN — PREGABALIN 300 MG: 100 CAPSULE ORAL at 09:29

## 2020-11-05 RX ADMIN — STANDARDIZED SENNA CONCENTRATE AND DOCUSATE SODIUM 2 TABLET: 8.6; 5 TABLET ORAL at 09:29

## 2020-11-05 RX ADMIN — TAMSULOSIN HYDROCHLORIDE 0.4 MG: 0.4 CAPSULE ORAL at 09:29

## 2020-11-05 ASSESSMENT — ENCOUNTER SYMPTOMS
CHEST TIGHTNESS: 0
WHEEZING: 0
CONSTIPATION: 0
DIARRHEA: 0
SHORTNESS OF BREATH: 1
NAUSEA: 0
ABDOMINAL PAIN: 0
COUGH: 0
VOMITING: 0
BLOOD IN STOOL: 0
RHINORRHEA: 0

## 2020-11-05 ASSESSMENT — PAIN SCALES - GENERAL: PAINLEVEL_OUTOF10: 0

## 2020-11-05 NOTE — PLAN OF CARE
Problem: Skin Integrity:  Goal: Will show no infection signs and symptoms  Description: Will show no infection signs and symptoms  11/5/2020 1724 by Marlin Umanzor RN  Outcome: Ongoing  11/5/2020 0807 by Roderick Nieves RN  Outcome: Ongoing  Goal: Absence of new skin breakdown  Description: Absence of new skin breakdown  11/5/2020 1724 by Marlin Umanzor RN  Outcome: Ongoing  11/5/2020 0807 by Roderick Nieves RN  Outcome: Ongoing     Problem: Falls - Risk of:  Goal: Will remain free from falls  Description: Will remain free from falls  11/5/2020 1724 by Marlin Umanzor RN  Outcome: Ongoing  11/5/2020 0807 by Roderick Nieves RN  Outcome: Met This Shift  Goal: Absence of physical injury  Description: Absence of physical injury  11/5/2020 1724 by Marlin Umanzor RN  Outcome: Ongoing  11/5/2020 0807 by Roderick Nieves RN  Outcome: Met This Shift     Problem: Pain:  Goal: Pain level will decrease  Description: Pain level will decrease  11/5/2020 1724 by Marlin Umanzor RN  Outcome: Ongoing  11/5/2020 0807 by Roderick Nieves RN  Outcome: Met This Shift  Goal: Control of acute pain  Description: Control of acute pain  11/5/2020 1724 by Marlin Umanzor RN  Outcome: Ongoing  11/5/2020 0807 by Roderick Nieves RN  Outcome: Met This Shift  Goal: Control of chronic pain  Description: Control of chronic pain  11/5/2020 1724 by Marlin Umanzor RN  Outcome: Ongoing  11/5/2020 0807 by Roderick Nieves RN  Outcome: Ongoing     Problem: OXYGENATION/RESPIRATORY FUNCTION  Goal: Patient will maintain patent airway  11/5/2020 1724 by Marlin Umanzor RN  Outcome: Ongoing  11/5/2020 0807 by Roderick Nieves RN  Outcome: Met This Shift  Goal: Patient will achieve/maintain normal respiratory rate/effort  Description: Respiratory rate and effort will be within normal limits for the patient  11/5/2020 1724 by Marlin Umanzor RN  Outcome: Ongoing  11/5/2020 0807 by Roderick Nieves RN  Outcome: Met This Shift     Problem: SKIN INTEGRITY  Goal: Skin integrity is maintained or improved  11/5/2020 1724 by Katie Jansen RN  Outcome: Ongoing  11/5/2020 0807 by Anshul San RN  Outcome: Ongoing     Problem: Nutrition  Goal: Optimal nutrition therapy  Description: Nutrition Problem #1: Inadequate oral intake  Intervention: Food and/or Nutrient Delivery: Start nutrition as able; if TF, suggest Standard without Fiber goal 65 mL/hr to provide 1872 kcal and 87 g pro/day.   Nutritional Goals: meet % of estimated nutrient needs     11/5/2020 1724 by Katie Jansen RN  Outcome: Ongoing  11/5/2020 0807 by Anshul San RN  Outcome: Ongoing     Problem: Confusion - Acute:  Goal: Absence of continued neurological deterioration signs and symptoms  Description: Absence of continued neurological deterioration signs and symptoms  11/5/2020 1724 by Katie Jansen RN  Outcome: Ongoing  11/5/2020 0807 by Anshul San RN  Outcome: Ongoing  Goal: Mental status will be restored to baseline  Description: Mental status will be restored to baseline  11/5/2020 1724 by Katie Jansen RN  Outcome: Ongoing  11/5/2020 0807 by Anshul San RN  Outcome: Ongoing     Problem: Discharge Planning:  Goal: Ability to perform activities of daily living will improve  Description: Ability to perform activities of daily living will improve  11/5/2020 1724 by Katie Jansen RN  Outcome: Ongoing  11/5/2020 0807 by Anshul San RN  Outcome: Ongoing  Goal: Participates in care planning  Description: Participates in care planning  11/5/2020 1724 by Katie Jansen RN  Outcome: Ongoing  11/5/2020 0807 by Anshul San RN  Outcome: Ongoing     Problem: Injury - Risk of, Physical Injury:  Goal: Will remain free from falls  Description: Will remain free from falls  11/5/2020 1724 by Katie Jansen RN  Outcome: Ongoing  11/5/2020 0807 by Anshul San RN  Outcome: Met This Shift  Goal: Absence of physical injury  Description: Absence of physical injury  11/5/2020 1724 by Ivania Vora RN  Outcome: Ongoing  11/5/2020 0807 by Deanne Valencia RN  Outcome: Met This Shift     Problem: Mood - Altered:  Goal: Mood stable  Description: Mood stable  11/5/2020 1724 by Ivania Vora RN  Outcome: Ongoing  11/5/2020 0807 by Deanne Valencia RN  Outcome: Ongoing  Goal: Absence of abusive behavior  Description: Absence of abusive behavior  11/5/2020 1724 by Ivania Vora RN  Outcome: Ongoing  11/5/2020 0807 by Deanne Valencia RN  Outcome: Ongoing  Goal: Verbalizations of feeling emotionally comfortable while being cared for will increase  Description: Verbalizations of feeling emotionally comfortable while being cared for will increase  11/5/2020 1724 by Ivania Vora RN  Outcome: Ongoing  11/5/2020 0807 by Deanne Valencia RN  Outcome: Ongoing     Problem: Psychomotor Activity - Altered:  Goal: Absence of psychomotor disturbance signs and symptoms  Description: Absence of psychomotor disturbance signs and symptoms  11/5/2020 1724 by Ivania Vora RN  Outcome: Ongoing  11/5/2020 0807 by Deanne Valencia RN  Outcome: Ongoing     Problem: Sensory Perception - Impaired:  Goal: Demonstrations of improved sensory functioning will increase  Description: Demonstrations of improved sensory functioning will increase  11/5/2020 1724 by Ivania Vora RN  Outcome: Ongoing  11/5/2020 0807 by Deanne Valencia RN  Outcome: Ongoing  Goal: Decrease in sensory misperception frequency  Description: Decrease in sensory misperception frequency  11/5/2020 1724 by Ivaina Vora RN  Outcome: Ongoing  11/5/2020 0807 by Deanne Valencia RN  Outcome: Ongoing  Goal: Able to refrain from responding to false sensory perceptions  Description: Able to refrain from responding to false sensory perceptions  11/5/2020 1724 by Ivania Vora RN  Outcome: Ongoing  11/5/2020 0807 by Deanne Valencia RN  Outcome: Ongoing  Goal: Demonstrates accurate environmental perceptions  Description: Demonstrates accurate environmental perceptions  11/5/2020 1724 by Katie Jansen RN  Outcome: Ongoing  11/5/2020 0807 by Anshul San RN  Outcome: Ongoing  Goal: Able to distinguish between reality-based and nonreality-based thinking  Description: Able to distinguish between reality-based and nonreality-based thinking  11/5/2020 1724 by Katie Jansen RN  Outcome: Ongoing  11/5/2020 0807 by Anshul San RN  Outcome: Ongoing  Goal: Able to interrupt nonreality-based thinking  Description: Able to interrupt nonreality-based thinking  11/5/2020 1724 by Katie Jansen RN  Outcome: Ongoing  11/5/2020 0807 by Anshul San RN  Outcome: Ongoing     Problem: Sleep Pattern Disturbance:  Goal: Appears well-rested  Description: Appears well-rested  11/5/2020 1724 by Katie Jansen RN  Outcome: Ongoing  11/5/2020 0807 by Anshul San RN  Outcome: Ongoing

## 2020-11-05 NOTE — PROGRESS NOTES
Occupational Therapy  Facility/Department: [facility]  Daily Treatment Note  NAME: Davian Al  : 1945  MRN: [id number]    Date of Service: 2020    Discharge Recommendations:  Patient would benefit from continued therapy after discharge     Pt requires high levels of physical assistance during engagement in LB ADLs/toileting tasks and during participation in all functional transfers/static standing; pt with very limited activity tolerance for participation in all functional tasks. Pt to require 24hr supervision/assistance and continued therapy services at discharge to maximize pt's safety and independence in performing functional tasks. Assessment   Performance deficits / Impairments: Decreased functional mobility ; Decreased safe awareness;Decreased balance;Decreased coordination;Decreased ADL status; Decreased posture;Decreased ROM; Decreased endurance;Decreased high-level IADLs;Decreased strength;Decreased fine motor control;Decreased cognition  Prognosis: Good;Fair  OT Education: OT Role;Plan of Care;ADL Adaptive Strategies;Transfer Training;Equipment; Energy Conservation  Patient Education: Safety with Transfers/Use of Joaquin Pluck and RW, Importance of Active Participation in Mobility/ADLs, Pursed Lip Breathing Techniques; fair return with verbal cueing required throughout session to incorporate  REQUIRES OT FOLLOW UP: Yes  Activity Tolerance  Activity Tolerance: Patient limited by fatigue  Safety Devices  Safety Devices in place: Yes  Type of devices: Call light within reach; Left in chair;Nurse notified  Restraints  Initially in place: No         Patient Diagnosis(es): There were no encounter diagnoses. has a past medical history of COPD (chronic obstructive pulmonary disease) ([facility] [address] Depression, GERD (gastroesophageal reflux disease), and Osteoporosis. has a past surgical history that includes Hysterectomy; back surgery; Breast surgery;  Carpal tunnel release; joint replacement; joint replacement; fracture surgery (Left, 12/20/2018); and Wrist fracture surgery (Left, 12/20/2018). Restrictions  Restrictions/Precautions  Restrictions/Precautions: Up as Tolerated, Fall Risk  Required Braces or Orthoses?: No  Position Activity Restriction  Other position/activity restrictions: 3L O2 via nasal cannula (pt removed nasal cannula multiple times throughout session and required VCs to keep nasal cannula in place)    Subjective   General  Patient assessed for rehabilitation services?: Yes  Response to previous treatment: Patient reporting fatigue but able to participate  Family / Caregiver Present: No  General Comment  Comments: RN ok'd for therapy this AM. Pt agreeable to participate in session and pleasant/cooperative throughout. Vital Signs  Patient Currently in Pain: Denies     Orientation  Orientation  Overall Orientation Status: Within Functional Limits     Objective    ADL  Feeding: Setup; Increased time to complete(pt seated upright in chair with breakfast tray setup on table top including opening containers, cutting food into bite size pieces and placing a pillow under RUE; pt required increased time for scooping items up with utensils and bringing food to mouth)  Grooming: Increased time to complete;Contact guard assistance(up to sink in masoud stedy to perform hand hygiene, oral care, facial hygiene, and comb hair; all hygiene items setup for pt on countertop and pt required increased time/effort to perform)  LE Dressing: Increased time to complete;Maximum assistance(seated EOB to don socks)  Toileting: Increased time to complete;Maximum assistance(pt required min A with use of masoud stedy for toilet transfer and max A for pericare/bottom hygiene following voiding/BM)           Balance  Sitting Balance: Contact guard assistance  Standing Balance: (min A with BUE support in masoud stedy; mod A with use of RW)  Standing Balance  Time: ~30-45 seconds standing tolerance prior to requiring prolonged seated rest breaks  Activity: static standing in masoud stedy at sink 3x; static standing at edge of recliner chair  Comment: Pt significantly unsteady with posterior lean and BLE buckling; pt with complaints of fatigue following minimal exertion and required VCs to incorporate pursed lip breathing techniques. Toilet Transfers  Toilet - Technique: (dependently transferred to/from toilet with use of masoud stedy)  Equipment Used: Standard toilet(BUE support on masuod stedy frame)  Toilet Transfer: Moderate assistance  Toilet Transfers Comments: Min A to transfer on/off toilet with high reliance on BUE on masoud stedy. Increased time/effort and VCs for initiation/sequencing/safety awareness throughout. Bed mobility  Scooting: Minimal assistance(scooting assessed in chair)  Comment: Pt up to EOB with nursing aid at therapist arrival and preparing to use masoud stedy to transfer to the bathroom; pt retired up to chair at therapist exit. Transfers  Sit to stand: Moderate assistance  Stand to sit: Maximum assistance  Transfer Comments: Pt able to perform sit <> stand transfer 3x from EOB, toilet, and chair with min A at start of session however progressing to mod A with fatigue; pt with high reliance of BUE on masoud stedy frame throughout. Pt performed transfer from chair with use of RW and required mod A for sit > stand and max A to control descent during stand > sit. Pt requires increased time/effort and max VCs throughout all functional transfers this date. Cognition  Overall Cognitive Status: Exceptions  Arousal/Alertness: Delayed responses to stimuli  Following Commands: Follows multistep commands with increased time; Follows multistep commands with repitition  Safety Judgement: Decreased awareness of need for assistance;Decreased awareness of need for safety  Problem Solving: Assistance required to generate solutions;Assistance required to implement solutions  Insights: Decreased awareness of deficits  Initiation: Requires cues for some  Sequencing: Requires cues for some            Plan   Plan  Times per week: 3-5 x week  Current Treatment Recommendations: Strengthening, ROM, Balance Training, Functional Mobility Training, Endurance Training, Safety Education & Training, Patient/Caregiver Education & Training, Equipment Evaluation, Education, & procurement, Home Management Training, Self-Care / ADL      Goals  Short term goals  Time Frame for Short term goals: Pt will, by discharge:  Short term goal 1: Dem cga for bed mobility  Short term goal 2: Dem sit to stand transfer with cga  Short term goal 3: Dem I with feeding self with AE as needed  Short term goal 4: Dem good safety awareness tech for all transfers/mobility  Short term goal 5: Dem min a for adl performance utilizing pursed lip breathing tech  Short term goal 6: Dem I with incentive spirometer use each session       Therapy Time   Individual Concurrent Group Co-treatment   Time In 0832         Time Out 0912         Minutes 40         Timed Code Treatment Minutes: 65 [facility]       Alex Chacon OTR/L

## 2020-11-05 NOTE — PROGRESS NOTES
PULMONARY & CRITICAL CARE MEDICINE PROGRESS  NOTE     Patient:  Geovanni Gardner  MRN: [id number]  Admit date: 10/22/2020    SUBJECTIVE     I personally interviewed/examined the patient, reviewed interval history and interpreted all available radiographic, laboratory data at the time of service. Chief Compliant/Reason for Initial Consult:   Bilateral pneumonia, altered mentation    Brief Hospital Course     52-year-old female with a history of COPD, hypothyroidism, initially admitted on 10/22/2020 with acute on chronic hypoxic and hypercapnic respiratory failure due to bilateral pneumonia   Requiring intubation. She had prolonged ICU stay complicated with encephalopathy, septic shock requiring pressors. new onset systolic dysfunction with EF of 25 to 30% concerning for Takotsubo cardiomyopathy and suspected left LV thrombus, new onset atrial fibrillation requiring anticoagulation with Lovenox 70 twice daily for both A. fib and left LV thrombus. As per cardiology recommendation patient will need LifeVest prior to discharge. Extubated on 11/2/2020 without immediate complication, put on 5 L oxygen saturating 94%.  Transferred out of ICU     Interval History:  No acute events     Review of Systems:  General: negative for chills, fatigue or fever  Respiratory: positive for shortness of breath negative for cough  Cardiovascular: negative for edema or palpitations  Gastrointestinal: negative for abdominal pain, change in bowel habits or nausea/vomiting  Genito-Urinary: negative for dysuria or urinary frequency/urgency  Musculoskeletal: negative for joint pain or joint swelling  Neurological: negative for numbness/tingling, seizures or weakness  Dermatological: negative for pruritus or rash    OBJECTIVE     VITAL SIGNS:   LAST-  BP (!) 128/55   Pulse 59   Temp 97.2 °F (36.2 °C) (Oral)   Resp 14   Ht 5' 4\" (1.626 m)   Wt 155 lb 13.8 oz (70.7 kg) SpO2 99%   BMI 26.75 kg/m²   8-24 HR RANGE-  TEMP Temp  Av.4 °F (36.3 °C)  Min: 97.2 °F (36.2 °C)  Max: 97.6 °F (32.2 °C)   BP Systolic (00GXZ), RWV:133 , Min:128 , ICO:949      Diastolic (20LXH), QUX:28, Min:55, Max:58     PULSE Pulse  Av  Min: 59  Max: 69   RR Resp  Av  Min: 14  Max: 14   O2 SAT SpO2  Av %  Min: 99 %  Max: 99 %   OXYGEN DELIVERY O2 Flow Rate (L/min)  Avg: 3 L/min  Min: 3 L/min  Max: 3 L/min     Systemic Examination:   General appearance - looks comfortable on 3 L oxygen via NC and in no acute distress  Mental status - alert, oriented to person, place, and time  Eyes - pupils equal and reactive, extraocular eye movements intact  Mouth - mucous membranes moist, pharynx normal without lesions  Neck - supple, no significant adenopathy  Chest - Chest was symmetrical without dullness to percussion. Breath sounds bilaterally were clear to auscultation. There were no wheezes, rhonchi or rales.   There is no intercostal recession or use of accessory muscles  Heart - normal rate, regular rhythm, normal S1, S2, no murmurs, rubs, clicks or gallops  Abdomen - soft, nontender, nondistended, no masses or organomegaly  Neurological - alert, oriented, normal speech, no focal findings or movement disorder noted  Extremities - peripheral pulses normal, no pedal edema, no clubbing or cyanosis  Skin - normal coloration and turgor, no rashes, no suspicious skin lesions noted     DATA REVIEW     Medications:  Scheduled Meds:   tamsulosin  0.4 mg Oral Daily    famotidine  20 mg Oral BID    furosemide  20 mg Oral Daily    ipratropium-albuterol  1 ampule Inhalation 4x daily    metoprolol tartrate  25 mg Oral BID    sennosides-docusate sodium  2 tablet Oral Daily    amiodarone  200 mg Oral BID    midodrine  10 mg Oral TID WC    spironolactone  25 mg Oral Daily    polyethylene glycol  17 g Oral Daily    enoxaparin  1 mg/kg Subcutaneous BID    sodium chloride flush  10 mL Intravenous 2 times per day    aspirin  81 mg Oral Daily    anesthetic and narcotic custom mixture   Intrathecal Once    levothyroxine  150 mcg Oral Daily    pregabalin  300 mg Oral BID    sodium chloride flush  10 mL Intravenous 2 times per day     Continuous Infusions:    LABS:-  ABGs:   No results found for: PH, PCO2, PO2, HCO3, O2SAT  No results for input(s): PHART, PO2ART, VTU3BQA, MHP0BQF, BEART, L2RNCIID in the last 72 hours. Lab Results   Component Value Date    POCPH 7.462 (H) 11/02/2020    POCPCO2 46.4 11/02/2020    POCPO2 69.8 (L) 11/02/2020    POCHCO3 33.2 (H) 11/02/2020    PHTI4BTT 94 11/02/2020     CBC:   Recent Labs     11/03/20 0620 11/04/20 0518 11/05/20 0428   WBC 9.7 9.1 8.0   HGB 10.3* 9.2* 9.7*   HCT 33.7* 29.3* 30.7*   MCV 99.1 97.3 96.5    361 370   LYMPHOPCT 11* 12* 14*   RBC 3.40* 3.01* 3.18*   MCH 30.3 30.6 30.5   MCHC 30.6 31.4 31.6   RDW 14.1 13.8 13.8     BMP:   Recent Labs     11/03/20 0620 11/04/20 0518 11/05/20 0428    135 133*   K 3.5* 2.9* 3.3*    95* 94*   CO2 27 29 30   BUN 25* 17 13   CREATININE 0.36* 0.40* 0.46*   GLUCOSE 88 88 92   PHOS 2.9  --   --      Liver Function Test:   No results for input(s): PROT, LABALBU, ALT, AST, GGT, ALKPHOS, BILITOT in the last 72 hours. Amylase/Lipase:  No results for input(s): AMYLASE, LIPASE in the last 72 hours. Coagulation Profile:   No results for input(s): INR, PROTIME, APTT in the last 72 hours. Cardiac Enzymes:  No results for input(s): CKTOTAL, CKMB, CKMBINDEX, TROPONINI in the last 72 hours.   Lactic Acid:  Lab Results   Component Value Date    LACTA 5.0 (H) 10/22/2020    LACTA 0.5 08/28/2019    LACTA 2.0 10/05/2018     BNP:   No results found for: BNP  D-Dimer:  No results found for: DDIMER  Others:   Lab Results   Component Value Date    TSH 0.69 10/24/2020     Lab Results   Component Value Date    DANE NEGATIVE 07/30/2020    SEDRATE 5 07/30/2020    CRP 2.6 07/30/2020     No results found for: Edvin Barefoot  No results found for: IRON, TIBC, FERRITIN  No results found for: SPEP, UPEP  No results found for: PSA, CEA, , AM7535,     Input/Output:    Intake/Output Summary (Last 24 hours) at 11/5/2020 1004  Last data filed at 11/4/2020 1105  Gross per 24 hour   Intake --   Output 850 ml   Net -850 ml       Microbiology:    Pathology:    Radiology Reports:  XR ABDOMEN (KUB) (SINGLE AP VIEW)   Final Result   Gaseous distention of the colon suggesting colonic ileus. Distal large bowel   obstruction not excluded. CT HEAD WO CONTRAST   Final Result   No acute intracranial abnormality. MRI may be obtained if clinically   indicated. XR CHEST PORTABLE   Final Result   1. Endotracheal and enteric tubes remain in place. Interval right IJ   central venous catheter with distal tip at the mid SVC. 2.  Diffuse hazy airspace and interstitial opacities in the lungs are   slightly increased when compared to the radiograph performed earlier this   morning. XR CHEST PORTABLE   Final Result   1. Endotracheal tube remains in appropriate position. 2. Bilateral airspace disease again noted, although less prominent. XR CHEST PORTABLE   Final Result   Multifocal interstitial and consolidative infiltrates seen throughout the   lungs bilaterally, with worsening consolidation in the right lung base. XR CHEST PORTABLE   Final Result   1. Endotracheal tube terminates at the level of the alejandro and should be   pulled back approximately 2-3 cm.   2. Redemonstration of coarse, diffuse bilateral interstitial opacities with   slightly increased superimposed hazy airspace opacity, possibly edema or   worsening pneumonia. XR CHEST PORTABLE   Final Result   Coarse bilateral airspace opacities within the upper and lower lobes, not   significantly changed, suggesting a multi lobar pneumonia. XR CHEST PORTABLE   Final Result   Partial clearing of bilateral lung opacities.          XR CHEST PORTABLE   Final Result   Increased bilateral lung opacities. XR CHEST PORTABLE   Final Result   Bilateral lung opacities likely pneumonia. Enteric tube with the tip within the proximal stomach. Consider slightly   advancing. XR ABDOMEN FOR NG/OG/NE TUBE PLACEMENT   Final Result   Life-support devices as above. Similar-appearing diffuse bilateral heterogeneous opacities and multifocal   consolidations. XR CHEST PORTABLE   Final Result   Life-support devices as above. Similar-appearing diffuse bilateral heterogeneous opacities and multifocal   consolidations. XR CHEST PORTABLE   Final Result   No significant interval change in multifocal bilateral consolidation, most   concerning for pneumonia. XR CHEST PORTABLE   Final Result   No significant change in the multifocal airspace opacities worrisome for   multifocal pneumonia.              Echocardiogram:   Results for orders placed during the hospital encounter of 10/22/20   ECHO Complete 2D W Doppler W Color    Narrative Transthoracic Echocardiography Report (TTE)     Patient Name [facility]    Date of Study           10/23/2020                Kevin Ishmael      Date of      1945  Gender                  Female   Birth      Age          77 year(s)  Race                          Room Number  0126        Height:                 64 inch, 162.56 cm      Corporate ID [id number]    Weight:                 157 pounds, 71.2 kg   #      Patient Acct [de-identified]   BSA:        1.76 m^2    BMI:        26.95 kg/m^2   #      MR #         [id number]     Ira Sine      Accession #  [id number]  Interpreting Physician  [id number] [facility]      Fellow                   Referring Nurse                            Practitioner      Interpreting             Referring Physician     Patricia Corral,   Fellow                                           MD     Type of Study      TTE procedure:2D Echocardiogram, M-Mode, Doppler, Color Doppler. Procedure Date  Date: 10/23/2020 Start: 08:27 AM    Study Location: [facility]  Technical Quality: Good visualization    History / Tech. Comments:  Procedure explained to patient. Study done bedside in MICU. Assess EF, elevated troponins, Sepsis, COPD    Patient Status: Inpatient    Height: 64 inches Weight: 157 pounds BSA: 1.76 m^2 BMI: 26.95 kg/m^2    HR: 106 bpm    CONCLUSIONS    Summary  Left ventricle is normal in size. Global left ventricular systolic function  is severely reduced. Estimated ejection fraction is 25-30 % . Calculated EF via heart model is 30 %. The apex appears akinetic. Cannot exclude an apical thrombus, consider  Definity. Mild mitral regurgitation. Mild to moderate tricuspid regurgitation. Estimated right ventricular  systolic pressure is 35 mmHg. IVC dilated but unable to assess respiratory collapse due to patient on  ventilator. Signature  ----------------------------------------------------------------------------   Electronically signed by Kallie Mckeon(Sonographer) on 10/23/2020   12:02 PM  ----------------------------------------------------------------------------    ----------------------------------------------------------------------------   Electronically signed by Saeed Nam(Interpreting physician) on 10/23/2020   01:43 PM  ----------------------------------------------------------------------------  FINDINGS  Left Atrium  Left atrium is normal in size. Left Ventricle  Left ventricle is normal in size. Global left ventricular systolic function  is severely reduced. Estimated ejection fraction is 25-30 % . Calculated EF via heart model is 30 %. The apex appears akinetic. Cannot exclude an apical thrombus, consider  Definity. Normal left ventricular wall thickness. Right Atrium  Right atrium is normal size . Right Ventricle  Normal right ventricle size and function.   Mitral Valve  Normal mitral valve structure. Mild mitral regurgitation. No mitral stenosis. Aortic Valve  Aortic valve structure and function normal.  No aortic insufficiency. No aortic stenosis. Tricuspid Valve  Normal tricuspid valve leaflets. Mild to moderate tricuspid regurgitation. No tricuspid stenosis. Estimated right ventricular systolic pressure is 35 mmHg. Pulmonic Valve  Pulmonic valve not well visualized but Doppler velocities are normal.  No significant pulmonic insufficiency. No evidence of pulmonic stenosis. Pericardial Effusion  No pericardial effusion. Miscellaneous  Normal aortic root dimension. E/E' average = 10.0. IVC dilated but unable to assess respiratory collapse due to patient on  ventilator.     M-mode / 2D Measurements & Calculations:      LVIDd:3.3 cm(3.7 - 5.6 cm)       Diastolic TPPJZX:199 ml   LVIDs:3 cm(2.2 - 4.0 cm)         Systolic FCKGME:66 ml   IVSd:1 cm(0.6 - 1.1 cm)          Aortic Root:2.8 cm(2.0 - 3.7 cm)   LVPWd:1 cm(0.6 - 1.1 cm)         LA Dimension: 2.6 cm(1.9 - 4.0 cm)   Fractional Shortenin.09 %     LA volume/Index: 46.5 ml /26m^2   Calculated LVEF (%): 30.84 %     LVOT:1.5 cm                                    RVDd:3.9 cm      Mitral:                                 Aortic      Valve Area (P1/2-Time): 5.12 cm^2       Peak Velocity: 0.96 m/s   Peak E-Wave: 0.78 m/s                   Mean Velocity: 0.69 m/s   Peak A-Wave: 0.55 m/s                   Peak Gradient: 3.68 mmHg   E/A Ratio: 1.43                         Mean Gradient: 2 mmHg   Peak Gradient: 2.46 mmHg   Mean Gradient: 1 mmHg   Deceleration Time: 127 msec             Area (continuity): 1.41 cm^2   P1/2t: 43 msec                          AV VTI: 16.5 cm      MR Velocity: 3.51 m/s   KIMBERLEY Volumetric: 0.34 cm^2   Area (continuity): 1.6 cm^2   Mean Velocity: 0.50 m/s   MR VTI: 105 cm      Tricuspid:                              Pulmonic:      Estimated RVSP: 35 mmHg                 Peak Velocity: 0.62 m/s   Peak TR Velocity: 2.24 m/s Peak Gradient: 1.56 mmHg   Peak TR Gradient: 20.0704 mmHg   Estimated RA Pressure: 15 mmHg                                              Estimated PASP: 35.07 mmHg     Diastology / Tissue Doppler    Lateral Wall E' velocity:0.08 m/s  Lateral Wall E/E':10       Cardiac Catheterization:   No results found for this or any previous visit.     ASSESSMENT AND PLAN     Assessment:    //Acute hypoxic and hypercapnic respiratory failure from bilateral pneumonia  Complicated by cardiogenic pulmonary edema  //New onset systolic failure EF 30-18%-EAQCWN Takotsubo cardiomyopathy  //New onset paroxysmal A. fib with RVR  //NSTEMI  //Shock septic versus cardiogenic  //Hypothyroidism    Plan:    DC AEROSOL   USE COMBIVENT PRN   OK TO DC     Electronically signed by Ella Reilly MD on 11/5/2020 at 10:04 AM

## 2020-11-05 NOTE — PROGRESS NOTES
Physical Therapy  Facility/Department: [facility] ONC/MED SURG  Daily Treatment Note  NAME: Joe Park  : 1945  MRN: [id number]    Date of Service: 2020    Discharge Recommendations:  Patient would benefit from continued therapy after discharge   PT Equipment Recommendations  Equipment Needed: No    Assessment   Body structures, Functions, Activity limitations: Decreased functional mobility ; Decreased balance;Decreased endurance;Decreased strength;Decreased posture;Decreased safe awareness  Assessment: Pt demo great improvement this date with functional mobility, able to amb 10ft x3 with RW and MOD A , unsteady with multiple minor LOBs, Pt is unsafe to attempt any functional mobility without assit. The pt would continue to benefit from more PT to address deficits. Prognosis: Good  PT Education: Goals; General Safety;Gait Training;Transfer Training;Functional Mobility Training  REQUIRES PT FOLLOW UP: Yes  Activity Tolerance  Activity Tolerance: Patient limited by fatigue;Patient limited by endurance     Patient Diagnosis(es): The encounter diagnosis was Chronic arthritis. has a past medical history of COPD (chronic obstructive pulmonary disease) ([address] Depression, GERD (gastroesophageal reflux disease), and Osteoporosis. has a past surgical history that includes Hysterectomy; back surgery; Breast surgery; Carpal tunnel release; joint replacement; joint replacement; fracture surgery (Left, 2018); and Wrist fracture surgery (Left, 2018). Restrictions  Restrictions/Precautions  Restrictions/Precautions: Up as Tolerated, Fall Risk  Required Braces or Orthoses?: No  Position Activity Restriction  Other position/activity restrictions: 3L O2 via nasal cannula  Subjective   General  Chart Reviewed: Yes  Response To Previous Treatment: Patient with no complaints from previous session. Family / Caregiver Present: Yes()  Subjective  Subjective: RN and pt agreeable to PT.  Pt up in bed upon arrival, pleasant and cooperative t/o  Pain Screening  Patient Currently in Pain: Denies  Vital Signs  Patient Currently in Pain: Denies       Orientation  Orientation  Overall Orientation Status: Within Functional Limits  Cognition      Objective   Bed mobility  Supine to Sit: Modified independent  Sit to Supine: (Pt retired to chair .)  Scooting: Minimal assistance  Comment: HOB elevated , increase time noted. Transfers  Sit to Stand: Moderate Assistance;Minimal Assistance  Stand to sit: Minimal Assistance  Bed to Chair: Moderate assistance  Comment: STS x3 perfomed requiring MOD A initailly and progress to MIN A with next 2 trails. Ambulation  Ambulation?: Yes  Ambulation 1  Surface: level tile  Device: Rolling Walker  Other Apparatus: Wheelchair follow  Assistance: Moderate assistance  Quality of Gait: flexed posture , unsteady with multiple minor LOBs. Gait Deviations: Slow Helen  Distance: 10ft x3  Comments: Fatigues quickly; 2 seated rest break. Balance  Posture: Fair  Sitting - Static: Fair;+  Sitting - Dynamic: Fair;-  Standing - Static: Poor;+  Standing - Dynamic: Poor;-  Comments: standing balance assessed with RW  Exercises  Comments: Seated LE exercise program: Long Arc Quads, hip abduction/adduction, heel/toe raises, and marches. Reps: x10    Goals  Short term goals  Time Frame for Short term goals: 14 visits  Short term goal 1: Pt to perform bed mobility SBA  Short term goal 2: Pt to transfer SBA  Short term goal 3: Pt to ambulate with least restricitve device 150 ft  Short term goal 4: Pt to navigate 3 steps with SBA  Short term goal 5: Pt to tolerate 45 minutes of activity to improve strength and endurance. Patient Goals   Patient goals : Get stronger so she can go home.     Plan    Plan  Times per week: 5-6x/week  Current Treatment Recommendations: Strengthening, Transfer Training, Endurance Training, Gait Training, Functional Mobility Training, Stair training, Safety Education & Training  Safety Devices  Type of devices:  All fall risk precautions in place, Call light within reach, Gait belt, Left in chair, Nurse notified     Therapy Time   Individual Concurrent Group Co-treatment   Time In [address]         Time Out 1526         Minutes 28         Timed Code Treatment Minutes: Garrett Ceballos, MARISELA

## 2020-11-05 NOTE — PROGRESS NOTES
[facility]  Office: [address], DO, Laurita Pena, DO, Rich Patee, DO, Tamera Sinclair Blood, DO, Robin James MD, Luma Whipple MD, Altaf Tolentino MD, Lizeth Vargas MD, Mili Frazier MD, Wicho Bishop MD, Mary Watkins MD, Marcos Galvin MD, Akash Delgado MD, Krzysztof Fenton, DO, Marisa Harrison MD, Stephanie Retana MD, Kanchan Scarce, DO, Aaron Davenport MD,  My Rota, DO, Dalton Humphrey MD, Ailyn Song MD, Jamin Lerma, [facility], [facility], CNP, Anabela Root, CNP, Byron Mayer, CNS, Stone Rivera, CNP, Miesha Yeh, CNP, Dominic Davis, CNP, Heddy Sacks, CNP, Ning Garcia, CNP, Emeli Tolbert PA-C, Vincent Shell, JESUS, Debra Gaviria, CNP, Mally Crocker, CNP, Joey York, CNP, Darryn Lambert, CNP, Ernesto Duffy, [facility]      Daily Progress Note     Admit Date: 10/22/2020  Bed/Room No.  2461/4004-85  Admitting Physician : Altaf Tolentino MD  Code Status :Full Code  Hospital Day:  LOS: 14 days   Chief Complaint:     Altered mental status     Principal Problem:    Severe sepsis [facility])  Active Problems:    Pneumonia of both lungs due to infectious organism    COPD exacerbation [facility])    Acute respiratory failure with hypoxia and hypercapnia (HCC)    Elevated troponin    Encephalopathy    Sepsis with acute hypoxic respiratory failure and septic shock (HCC)    Biventricular congestive heart failure [facility])  Resolved Problems:    * No resolved hospital problems. *    Subjective : Interval History/Significant events :  11/05/20    Patient reports improvement in fatigue, breathing. Patient is on 2 L nasal cannula. She denies cough, congestion, productive sputum. She is eating and drinking better. Patient slept in bed last night with had an elevated. She usually sleeps in chair at home. Vitals - Stable afebrile  Labs -hypokalemia 3.3, normal kidney function. Normal white count, low hemoglobin.     Nursing notes , Consults notes Daily  polyethylene glycol, 17 g, Oral, Daily  enoxaparin, 1 mg/kg, Subcutaneous, BID  sodium chloride flush, 10 mL, Intravenous, 2 times per day  aspirin, 81 mg, Oral, Daily  anesthetic and narcotic custom mixture, , Intrathecal, Once  levothyroxine, 150 mcg, Oral, Daily  pregabalin, 300 mg, Oral, BID  sodium chloride flush, 10 mL, Intravenous, 2 times per day        Review of Systems   Review of Systems   Constitutional: Positive for fatigue. Negative for appetite change, fever and unexpected weight change. HENT: Negative for congestion, rhinorrhea and sneezing. Eyes: Negative for visual disturbance. Respiratory: Positive for shortness of breath. Negative for cough, chest tightness and wheezing. Cardiovascular: Negative for chest pain and palpitations. Gastrointestinal: Negative for abdominal pain, blood in stool, constipation, diarrhea, nausea and vomiting. Genitourinary: Negative for dysuria, enuresis, frequency and hematuria. Musculoskeletal: Negative for arthralgias and myalgias. Skin: Negative for rash. Neurological: Negative for dizziness, weakness, light-headedness and headaches. Hematological: Does not bruise/bleed easily. Psychiatric/Behavioral: Negative for dysphoric mood and sleep disturbance. Objective :      Current Vitals : Temp: 97.2 °F (36.2 °C),  Pulse: 59, Resp: 14, BP: (!) 128/55, SpO2: 99 %  Last 24 Hrs Vitals   Patient Vitals for the past 24 hrs:   BP Temp Temp src Pulse Resp SpO2   11/05/20 0833 (!) 128/55 97.2 °F (36.2 °C) Oral 59 14 99 %   11/04/20 2147 -- -- -- -- 16 97 %   11/04/20 1901 (!) 134/58 97.6 °F (36.4 °C) Axillary 69 18 99 %   11/04/20 1215 -- -- -- -- 16 --   11/04/20 0901 -- -- -- -- 18 --     Intake / output   11/04 0701 - 11/05 0700  In: 20 [I.V.:20]  Out: 850 [Urine:850]  Physical Exam:  Physical Exam  Vitals signs and nursing note reviewed. Constitutional:       General: She is not in acute distress. Appearance: She is not diaphoretic. Interventions: Nasal cannula in place. HENT:      Head: Normocephalic and atraumatic. Nose:      Right Sinus: No maxillary sinus tenderness or frontal sinus tenderness. Left Sinus: No maxillary sinus tenderness or frontal sinus tenderness. Mouth/Throat:      Pharynx: No oropharyngeal exudate. Eyes:      General: No scleral icterus. Conjunctiva/sclera: Conjunctivae normal.      Pupils: Pupils are equal, round, and reactive to light. Neck:      Musculoskeletal: Full passive range of motion without pain and neck supple. Thyroid: No thyromegaly. Vascular: No JVD. Cardiovascular:      Rate and Rhythm: Normal rate and regular rhythm. Pulses:           Dorsalis pedis pulses are 2+ on the right side and 2+ on the left side. Heart sounds: Normal heart sounds. No murmur. Pulmonary:      Effort: Pulmonary effort is normal.      Breath sounds: Normal breath sounds. No wheezing or rales. Abdominal:      Palpations: Abdomen is soft. There is no mass. Tenderness: There is no abdominal tenderness. Lymphadenopathy:      Head:      Right side of head: No submandibular adenopathy. Left side of head: No submandibular adenopathy. Cervical: No cervical adenopathy. Skin:     General: Skin is warm. Neurological:      Mental Status: She is alert and oriented to person, place, and time. Motor: No tremor. Psychiatric:         Behavior: Behavior is cooperative.            Laboratory findings:    Recent Labs     11/03/20 0620 11/04/20 0518 11/05/20 0428   WBC 9.7 9.1 8.0   HGB 10.3* 9.2* 9.7*   HCT 33.7* 29.3* 30.7*    361 370     Recent Labs     11/03/20 0620 11/04/20 0518 11/05/20 0428    135 133*   K 3.5* 2.9* 3.3*    95* 94*   CO2 27 29 30   GLUCOSE 88 88 92   BUN 25* 17 13   CREATININE 0.36* 0.40* 0.46*   MG 2.4 2.1 2.1   CALCIUM 9.1 8.8 9.1   PHOS 2.9  --   --           Specific Gravity, UA   Date Value Ref Range Status   10/30/2020 1.013 1.005 - 1.030 Final     Protein, UA   Date Value Ref Range Status   10/30/2020 NEGATIVE NEGATIVE Final     RBC, UA   Date Value Ref Range Status   10/30/2020 20 TO 50 0 - 4 /HPF Final     Comment:     Reference range defined for non-centrifuged specimen. Bacteria, UA   Date Value Ref Range Status   10/30/2020 NOT REPORTED None Final     Nitrite, Urine   Date Value Ref Range Status   10/30/2020 NEGATIVE NEGATIVE Final     WBC, UA   Date Value Ref Range Status   10/30/2020 0 TO 2 0 - 5 /HPF Final     Leukocyte Esterase, Urine   Date Value Ref Range Status   10/30/2020 NEGATIVE NEGATIVE Final       Imaging / Clinical Data :-   Xr Abdomen (kub) (single Ap View)    Result Date: 11/2/2020  Gaseous distention of the colon suggesting colonic ileus. Distal large bowel obstruction not excluded. Ct Head Wo Contrast    Result Date: 10/31/2020  No acute intracranial abnormality. MRI may be obtained if clinically indicated. Xr Chest Portable    Result Date: 10/30/2020  1. Endotracheal and enteric tubes remain in place. Interval right IJ central venous catheter with distal tip at the mid SVC. 2.  Diffuse hazy airspace and interstitial opacities in the lungs are slightly increased when compared to the radiograph performed earlier this morning. Xr Chest Portable    Result Date: 10/30/2020  1. Endotracheal tube remains in appropriate position. 2. Bilateral airspace disease again noted, although less prominent. Clinical Course : gradually improving  Assessment and Plan  :        1. Sepsis secondary to gram- negative pneumonia Klebsiella pneumoniae -completed azithromycin and Rocephin. 2. Shock septic versus cardiogenic-improved with antibiotics. Monitor off antibiotic. 3. Nonischemic-cardiomyopathy  -Takotsubo -Aldactone. Lopressor 25 twice daily. Needs life Vest at discharge. Follow up with cardiology as outpatient.   4. Toxic metabolic encephalopathy - seizure activity ruled out with negative LTME.  No antiepileptics indicated. 5. Acute Respiratory Failure with hypoxia and hypercarbia - s/p intubation extubated on 11/2/2020. On supplemental oxygen by nasal cannula. 6. Urinary retention-start Flomax, straight cath as needed. 7. COPD  8. Paroxysmal atrial fibrillation - on Lovenox 1 mg/kg twice daily. Switch to [facility]  at discharge. Continue Lopressor 25 twice daily.         Discharge planning to SNF      Plan and updates discussed with patient ,  answers  explained to satisfaction.    Plan discussed with Staff Miracle Roberto RN     (Please note that portions of this note were completed with a voice recognition program. Efforts were made to edit the dictations but occasionally words are mis-transcribed.)      Deb eHster MD  11/5/2020

## 2020-11-05 NOTE — CARE COORDINATION
Transition planning  Received call from [address] at [facility], she states if patient needs Duoneb aerosols at discharge they are unable to accept patient due to Covid precautions. She sates they are asking if aerosol treatment can be changed to inhalers. 1000 Dr. Lyndsay Hoover states he pt will be discharged on inhalers and not aerosol treatments. [address] and spoke with Pamella at  [facility], updated that patient will not be coming on aerosol treatments, changed to inhalers. Also informed her pt will be discharged with Life Sherrie Saravanan states they are able to accept patient with Life Vest.  [address] [place] [address] RN with cardiology re: order for Life Vest.  1250 Faxed face sheet,cardiology note, echo report, cardiac cath report and order for life vest to [facility] [phone]. Left VM for Rick with [facility] re: Life Vest, informed him patient is ready for discharge today. [address] here, pt fitted for Life Vest.  [facility], first available p/u time is 11:00 pm tonight, transport set up for 10:00 am tomorrow 11-6. Called and spoke with Oneyda at [facility], informed patient has been fitted for [employer]	[place], transportation set up for tomorrow 11-6 @10:00 am.  Informed pt's RN of 10:00 am p/u time for tomorrow 11-6. Informed patient and pt's daughter @ bedside of discharge tomorrow @10:00 am to [facility]. Report # [phone] [address] [place] [address]  Fax # [phone]  Faxed [facility]  to [facility] at [address].

## 2020-11-05 NOTE — PLAN OF CARE
Problem: Falls - Risk of:  Goal: Will remain free from falls  Description: Will remain free from falls  Outcome: Met This Shift  Goal: Absence of physical injury  Description: Absence of physical injury  Outcome: Met This Shift     Problem: Pain:  Goal: Pain level will decrease  Description: Pain level will decrease  Outcome: Met This Shift  Goal: Control of acute pain  Description: Control of acute pain  Outcome: Met This Shift     Problem: OXYGENATION/RESPIRATORY FUNCTION  Goal: Patient will maintain patent airway  Outcome: Met This Shift  Goal: Patient will achieve/maintain normal respiratory rate/effort  Description: Respiratory rate and effort will be within normal limits for the patient  Outcome: Met This Shift     Problem: Injury - Risk of, Physical Injury:  Goal: Will remain free from falls  Description: Will remain free from falls  Outcome: Met This Shift  Goal: Absence of physical injury  Description: Absence of physical injury  Outcome: Met This Shift     Problem: Skin Integrity:  Goal: Will show no infection signs and symptoms  Description: Will show no infection signs and symptoms  Outcome: Ongoing  Goal: Absence of new skin breakdown  Description: Absence of new skin breakdown  Outcome: Ongoing     Problem: Pain:  Goal: Control of chronic pain  Description: Control of chronic pain  Outcome: Ongoing     Problem: SKIN INTEGRITY  Goal: Skin integrity is maintained or improved  Outcome: Ongoing     Problem: Nutrition  Goal: Optimal nutrition therapy  Description: Nutrition Problem #1: Inadequate oral intake  Intervention: Food and/or Nutrient Delivery: Start nutrition as able; if TF, suggest Standard without Fiber goal 65 mL/hr to provide 1872 kcal and 87 g pro/day.   Nutritional Goals: meet % of estimated nutrient needs     Outcome: Ongoing     Problem: Confusion - Acute:  Goal: Absence of continued neurological deterioration signs and symptoms  Description: Absence of continued neurological deterioration signs and symptoms  Outcome: Ongoing  Goal: Mental status will be restored to baseline  Description: Mental status will be restored to baseline  Outcome: Ongoing     Problem: Discharge Planning:  Goal: Ability to perform activities of daily living will improve  Description: Ability to perform activities of daily living will improve  Outcome: Ongoing  Goal: Participates in care planning  Description: Participates in care planning  Outcome: Ongoing     Problem: Mood - Altered:  Goal: Mood stable  Description: Mood stable  Outcome: Ongoing  Goal: Absence of abusive behavior  Description: Absence of abusive behavior  Outcome: Ongoing  Goal: Verbalizations of feeling emotionally comfortable while being cared for will increase  Description: Verbalizations of feeling emotionally comfortable while being cared for will increase  Outcome: Ongoing     Problem: Psychomotor Activity - Altered:  Goal: Absence of psychomotor disturbance signs and symptoms  Description: Absence of psychomotor disturbance signs and symptoms  Outcome: Ongoing     Problem: Sensory Perception - Impaired:  Goal: Demonstrations of improved sensory functioning will increase  Description: Demonstrations of improved sensory functioning will increase  Outcome: Ongoing  Goal: Decrease in sensory misperception frequency  Description: Decrease in sensory misperception frequency  Outcome: Ongoing  Goal: Able to refrain from responding to false sensory perceptions  Description: Able to refrain from responding to false sensory perceptions  Outcome: Ongoing  Goal: Demonstrates accurate environmental perceptions  Description: Demonstrates accurate environmental perceptions  Outcome: Ongoing  Goal: Able to distinguish between reality-based and nonreality-based thinking  Description: Able to distinguish between reality-based and nonreality-based thinking  Outcome: Ongoing  Goal: Able to interrupt nonreality-based thinking  Description: Able to interrupt nonreality-based thinking  Outcome: Ongoing     Problem: Sleep Pattern Disturbance:  Goal: Appears well-rested  Description: Appears well-rested  Outcome: Ongoing

## 2020-11-05 NOTE — DISCHARGE INSTR - COC
Continuity of Care Form    Patient Name: Jose Armando Moraes   :  1945  MRN:  [id number]    Admit date:  10/22/2020  Discharge date:  2020    Code Status Order: Full Code   Advance Directives:   [facility] Documentation       Date/Time Healthcare Directive Type of Healthcare Directive Copy in [facility] [address] Agent's Name Healthcare Agent's Phone Number    10/22/20 2009  Unknown, patient unable to respond due to medical condition -- -- -- -- --            Admitting Physician:  Yash Lomas MD  PCP: Ana Wright MD    Discharging Nurse: [facility] Unit/Room#: 3371/8873-27  Discharging Unit Phone Number: [phone]    Emergency Contact:   Extended Emergency Contact Information  Primary Emergency Contact: Jerrell South  Address: [address], [address] Phone: [phone]  Mobile Phone: [phone]  Relation: Spouse  Hearing or visual needs: None  Other needs: None  Preferred language: English   needed? No  Secondary Emergency Contact: Jose Eduardo South   [address] Phone: [phone]  Relation: Child   needed?  No    Past Surgical History:  Past Surgical History:   Procedure Laterality Date    BACK SURGERY      BREAST SURGERY      CARPAL TUNNEL RELEASE      FRACTURE SURGERY Left 2018    ORIF wrist    HYSTERECTOMY      JOINT REPLACEMENT      right knee    JOINT REPLACEMENT      WRIST FRACTURE SURGERY Left 2018    WRIST OPEN REDUCTION INTERNAL FIXATION-DISTAL RADIUS performed by Jennifer Peña MD at [address] [place] [address]       Immunization History:   Immunization History   Administered Date(s) Administered    Influenza, High Dose (Fluzone 65 yrs and older) 10/04/2018    Pneumococcal Conjugate 13-valent ([id number]) 2018    Pneumococcal Polysaccharide ([id number]) 2014       Active Problems:  Patient Active Problem List   Diagnosis Code    Pneumonia of both lungs due to infectious organism J18.9    Acute metabolic encephalopathy U52.45    Hypothyroidism E03.9    Major depressive disorder F32.9    Chronic midline low back pain without sciatica M54.5, G89.29    Iron deficiency anemia D50.9    Status post surgery Z98.890    Sepsis due to pneumonia (HCC) J18.9, A41.9    Lactic acidosis E87.2    Septic shock (HCC) A41.9, R65.21    Severe sepsis (HCC) A41.9, R65.20    COPD exacerbation (HCC) J44.1    Acute respiratory failure with hypoxia and hypercapnia (HCC) J96.01, J96.02    Elevated troponin R77.8    Encephalopathy G93.40    Sepsis with acute hypoxic respiratory failure and septic shock (HCC) A41.9, R65.21, J96.01    Biventricular congestive heart failure ([facility]) I50.82       Isolation/Infection:   Isolation            No Isolation          Patient Infection Status       Infection Onset Added Last Indicated Last Indicated By Review Planned Expiration Resolved Resolved By    None active    Resolved    COVID-19 Rule Out 10/22/20 10/22/20 10/22/20 COVID-19, PCR (Ordered)   10/22/20 Rule-Out Test Resulted    COVID-19 Rule Out 08/16/20 08/16/20 08/16/20 COVID-19, PCR (Ordered)   08/16/20 Rule-Out Test Resulted            Nurse Assessment:  Last Vital Signs: BP (!) 128/55   Pulse 59   Temp 97.2 °F (36.2 °C) (Oral)   Resp 14   Ht 5' 4\" (1.626 m)   Wt 155 lb 13.8 oz (70.7 kg)   SpO2 99%   BMI 26.75 kg/m²     Last documented pain score (0-10 scale): Pain Level: 0  Last Weight:   Wt Readings from Last 1 Encounters:   11/01/20 155 lb 13.8 oz (70.7 kg)     Mental Status:  oriented and alert    IV Access:  - None    Nursing Mobility/ADLs:  Walking   Dependent  Transfer  Dependent  Bathing  Independent  Dressing  Assisted  Toileting  Independent  Feeding  Independent  Med Admin  Independent  Med Delivery   whole    Wound Care Documentation and Therapy:        Elimination:  Continence:   · Bowel:  Yes  · Bladder: Yes  Urinary Catheter: None Colostomy/Ileostomy/Ileal Conduit: No       Date of Last BM: 11/05/2020    Intake/Output Summary (Last 24 hours) at 11/5/2020 0959  Last data filed at 11/4/2020 1105  Gross per 24 hour   Intake --   Output 850 ml   Net -850 ml     I/O last 3 completed shifts: In: 20 [I.V.:20]  Out: 850 [Urine:850]    Safety Concerns: At Risk for Falls    Impairments/Disabilities:      weakness    Nutrition Therapy:  Current Nutrition Therapy:   - Oral Diet:  Dental Soft    Routes of Feeding: Oral  Liquids: No Restrictions  Daily Fluid Restriction: no  Last Modified Barium Swallow with Video (Video Swallowing Test): not done    Treatments at the Time of Hospital Discharge:   Respiratory Treatments: Albuterol inhaler  Oxygen Therapy:  is not on home oxygen therapy. , but has been using oxygen at 3 lpm while here  Ventilator:    - No ventilator support    Rehab Therapies: Physical Therapy and Occupational Therapy  Weight Bearing Status/Restrictions: No weight bearing restirctions  Other Medical Equipment (for information only, NOT a DME order):  walker and masoud steady  Other Treatments: none    Patient's personal belongings (please select all that are sent with patient):  life vest    RN SIGNATURE:  Electronically signed by Alondra Whitman RN on 11/6/20 at 8:17 AM EST    CASE MANAGEMENT/SOCIAL WORK SECTION    Inpatient Status Date: ***    Readmission Risk Assessment Score:  Readmission Risk              Risk of Unplanned Readmission:        27           Discharging to Facility/ Agency   [facility] at [address], [place] [address]       Phone: [phone]       Fax: [phone]            Dialysis Facility (if applicable)   · Name:  · Address:  · Dialysis Schedule:  · Phone:  · Fax:    / signature: Electronically signed by Jackie Vázquez RN on 11/5/20 at 5:13 PM EST    PHYSICIAN SECTION    Prognosis: Fair    Condition at Discharge: Stable    Rehab Potential (if transferring to Rehab): Fair    Recommended Labs or Other Treatments After Discharge: daily weight monitoring, Repeat BMP every 3 - 5 days. PT , OT   Continue life Vest   Follow up with cardiology in 4 weeks    Physician Certification: I certify the above information and transfer of Anni Rae  is necessary for the continuing treatment of the diagnosis listed and that she requires [place] for less 30 days.      Update Admission H&P: No change in H&P    PHYSICIAN SIGNATURE:  Electronically signed by Darryle So, MD on 11/5/20 at 10:00 AM EST

## 2020-11-05 NOTE — PROGRESS NOTES
[facility] - Progress Note    Today's Date and Time: 11/5/2020, 5:25 PM    Impression :   · Shock :septic Vs cardiogenic: resolved. · Bandemia  · CHF with elevated ProBNP  · Pulmonary edema  · Superimposed pneumonia; sputum cultures 10/25/20 grew Klebsiella Pneumoniae moderate growth  · COPD  · Elevated Troponins  · Hypothyroidism    Recommendations:     · Rocephin 2 gm IV q 24 hr (start date 10/23). Stop 11-3-20  · D/C Zithromax 500 mg IV q 24 hr (start date 10/23. Stop date 10-27-20)    Medical Decision Making/Summary/Discussion:   · Patient with CHF  · Rapid deterioration with acute hypoxia and hypercarbia requiring intubation  · CXR with rapid fluid shifts suggesting pulmonary edema. Very high ProBNP  · Can not exclude associated pneumonia at this stage. Pro calcitonin elevated    Infection Control Recommendations   · [place] Precautions    Antimicrobial Stewardship Recommendations   · Simplification of therapy  · Targeted therapy    Coordination of Outpatient Care:   · Estimated Length of IV antimicrobials:TBD  · Patient will need Midline Catheter Insertion: No  · Patient will need PICC line Insertion:Yes  · Patient will need: Home IV , [place],  SNF,  LTAC: TBD  · Patient will need outpatient wound care:No    Chief complaint/reason for consultation:   · Sepsis secondary to pneumonia    History of Present Illness:   Anderson Watt is a 77y.o.-year-old  female who was initially admitted on 10/22/2020. Patient seen at the request of Dr. Nanette Marmolejo:  History was obtained from chart review and unobtainable from patient due to mental status. She has a history of hypothyroidism, COPD, iron deficiency anemia, presented to [facility] emergency department on 10/22 for difficulties breathing. Found to have pneumonia, and started on the sepsis protocol. At [facility]. V's, she is unable to provide much history as she is somnolent, but does follow commands. per EMR review became on the morning of 10/22, prompting her emergency department visit. There they performed a chest x-ray as well as CT scan which was negative for any pulmonary embolism but did show bilateral infiltrates. Found to have a lactic acidosis as well, and started on Rocephin and azithromycin. Initially hypercapnic, her mentation improved on BiPAP on 10/22. CURRENT EVALUATION: 11/05/20     Afebrile  VS stable    She was extubated on 11/2/20. At present, she is on nasal canula 5-->3 LO2. Transferred out of ICU    She is lying on bed. Awake, alert, oriented. Denies fevers, chills. She is on IV 20 mg Lasix. Leukocytosis resolved. Completed Rocephin 2 gm IV q 24 hr (start date 10/23, stop 11-3-20). Gastroenterology:Severe constipation, one episode of vomiting yesterday morning. Tube feed on hold. Follow up on ABD Xray showed gaseous distension of colon suggesting colonic ileus. Severe constipation resolved today. on clear fluids. Pt is having paroxysmal A. Fib with RVR . Currently in sinus rhythm. CXR:  · 10/24 increased bilat infiltrates  · 10/26 shows bilateral upper and lower lobe infiltrates unchanged from previous x-ray. · 10-27-20: shows coarse diffuse bilateral infiltrates with superimposed haziness, worse than yesterday. Patient shows fluid shifts. · 10-28-20: Multifocal interstitial and consolidative infiltrates seen throughout the lungs bilaterally, with worsening consolidation in the right lung base.    · 10-30-20: Bilateral airspace disease again noted, although less prominent    Labs:  BUN: 15>14>19>24>23>25>21->25  Creatinine: 0.37>0.47>0.52>0.48>0.45->0.36    WBC: 13.7 >14.2>9.7>6.5>9.9>8.6>8.9>10.7->9.7  Hgb: 12 >10.5 >11.3>10.9>10.6>10.4-->9.6>9.8->10.3  Platelet: 505 >438 >117>683>614>561>548-->030>986->638    Lactic acid 6.2 > 2.9 > 2.0>1.7>5>0.53    Cultures:  Urine:  ·   Blood:  · 10/22/20: No growth   Sputum :  · 10/24/20: Klebsiella pneumoniae moderate growth. Wound:     CSF         Discussed with patient, RN,     I have personally reviewed the past medical history, past surgical history, medications, social history, and family history, and I have updated the database accordingly.   Past Medical History:     Past Medical History:   Diagnosis Date    COPD (chronic obstructive pulmonary disease) ([facility] [address]     Depression     GERD (gastroesophageal reflux disease)     Osteoporosis        Past Surgical  History:     Past Surgical History:   Procedure Laterality Date    BACK SURGERY      BREAST SURGERY      CARPAL TUNNEL RELEASE      FRACTURE SURGERY Left 12/20/2018    ORIF wrist    HYSTERECTOMY      JOINT REPLACEMENT      right knee    JOINT REPLACEMENT      WRIST FRACTURE SURGERY Left 12/20/2018    WRIST OPEN REDUCTION INTERNAL FIXATION-DISTAL RADIUS performed by Anaya Almaraz MD at [address]       Medications:      tamsulosin  0.4 mg Oral Daily    famotidine  20 mg Oral BID    furosemide  20 mg Oral Daily    ipratropium-albuterol  1 ampule Inhalation 4x daily    metoprolol tartrate  25 mg Oral BID    sennosides-docusate sodium  2 tablet Oral Daily    amiodarone  200 mg Oral BID    midodrine  10 mg Oral TID WC    spironolactone  25 mg Oral Daily    polyethylene glycol  17 g Oral Daily    enoxaparin  1 mg/kg Subcutaneous BID    sodium chloride flush  10 mL Intravenous 2 times per day    aspirin  81 mg Oral Daily    anesthetic and narcotic custom mixture   Intrathecal Once    levothyroxine  150 mcg Oral Daily    pregabalin  300 mg Oral BID    sodium chloride flush  10 mL Intravenous 2 times per day       Social History:     Social History     Socioeconomic History    Marital status:      Spouse name: Not on file    Number of children: Not on file    Years of education: Not on file    Highest education level: Not on file   Occupational History    Not on file   Social Needs    Financial resource strain: Not on file   [place] insecurity     Worry: Not on file     Inability: Not on file    Transportation needs     Medical: Not on file     Non-medical: Not on file   Tobacco Use    Smoking status: Former Smoker     Last attempt to quit: 1976     Years since quittin.0    Smokeless tobacco: Never Used   Substance and Sexual Activity    Alcohol use: No    Drug use: No    Sexual activity: Not on file   Lifestyle    Physical activity     Days per week: Not on file     Minutes per session: Not on file    Stress: Not on file   Relationships    Social connections     Talks on phone: Not on file     Gets together: Not on file     Attends [employer] service: Not on file     Active member of club or organization: Not on file     Attends meetings of clubs or organizations: Not on file     Relationship status: Not on file    Intimate partner violence     Fear of current or ex partner: Not on file     Emotionally abused: Not on file     Physically abused: Not on file     Forced sexual activity: Not on file   Other Topics Concern    Not on file   Social History Narrative    Not on file       Family History:   No family history on file. Allergies:   Patient has no known allergies. Review of Systems:   Review of Systems   Unable to perform ROS: Intubated          Physical Examination :     Patient Vitals for the past 8 hrs:   BP Pulse Resp SpO2   20 1612 (!) 112/51 83 -- --   20 1525 -- -- 18 95 %   20 1148 -- -- 18 95 %     Physical Exam  Constitutional:       General: She is not in acute distress. Appearance: Normal appearance. She is obese. She is ill-appearing. HENT:      Head: Normocephalic and atraumatic. Nose: Nose normal. No congestion. Mouth/Throat:      Mouth: Mucous membranes are moist.   Eyes:      General: No scleral icterus. Conjunctiva/sclera: Conjunctivae normal.   Neck:      Musculoskeletal: Neck supple. No neck rigidity.    Cardiovascular:      Rate and Rhythm: Normal rate and regular rhythm. Heart sounds: Normal heart sounds. No murmur. Pulmonary:      Effort: No respiratory distress. Breath sounds: Rales present. Abdominal:      General: There is no distension. Palpations: Abdomen is soft. Tenderness: There is no abdominal tenderness. Genitourinary:     Comments: Urine maral  R fem line in good condition  Musculoskeletal:         General: No swelling or tenderness. Skin:     Coloration: Skin is not jaundiced or pale. Neurological:      Comments: Sedated    Psychiatric:      Comments: Calm on the vent sedated          Medical Decision Making -Laboratory:   I have independently reviewed/ordered the following labs:    CBC with Differential:   Recent Labs     11/04/20 0518 11/05/20 0428   WBC 9.1 8.0   HGB 9.2* 9.7*   HCT 29.3* 30.7*    370   LYMPHOPCT 12* 14*   MONOPCT 9 11     BMP:   Recent Labs     11/04/20 0518 11/05/20 0428    133*   K 2.9* 3.3*   CL 95* 94*   CO2 29 30   BUN 17 13   CREATININE 0.40* 0.46*   MG 2.1 2.1     Hepatic Function Panel:   No results for input(s): PROT, LABALBU, BILIDIR, IBILI, BILITOT, ALKPHOS, ALT, AST in the last 72 hours. No results for input(s): RPR in the last 72 hours. No results for input(s): HIV in the last 72 hours. No results for input(s): BC in the last 72 hours.   Lab Results   Component Value Date    MUCUS NOT REPORTED 10/30/2020    RBC 3.18 11/05/2020    TRICHOMONAS NOT REPORTED 10/30/2020    WBC 8.0 11/05/2020    YEAST NOT REPORTED 10/30/2020    TURBIDITY TURBID 10/30/2020     Lab Results   Component Value Date    CREATININE 0.46 11/05/2020    GLUCOSE 92 11/05/2020       Medical Decision Making-Imaging:   10-30-20:      10 -28 -20:      10-27-20        10/23/20:            Suly Hopkins MD

## 2020-11-05 NOTE — PROGRESS NOTES
Pt has life vest that will be going with her to the [facility]. This must be wore all the time except when patient is showering.  She has been instructed in how to use this, but paperwork with instructions, as well as contact information for the Sharri Garnett representative is in the packet for [facility]

## 2020-11-06 VITALS
HEART RATE: 71 BPM | HEIGHT: 64 IN | DIASTOLIC BLOOD PRESSURE: 64 MMHG | TEMPERATURE: 98.4 F | RESPIRATION RATE: 20 BRPM | BODY MASS INDEX: 26.61 KG/M2 | SYSTOLIC BLOOD PRESSURE: 120 MMHG | WEIGHT: 155.87 LBS | OXYGEN SATURATION: 95 %

## 2020-11-06 LAB
ABSOLUTE EOS #: 0.15 K/UL (ref 0–0.44)
ABSOLUTE IMMATURE GRANULOCYTE: 0.06 K/UL (ref 0–0.3)
ABSOLUTE LYMPH #: 1.46 K/UL (ref 1.1–3.7)
ABSOLUTE MONO #: 0.75 K/UL (ref 0.1–1.2)
ANION GAP SERPL CALCULATED.3IONS-SCNC: 7 MMOL/L (ref 9–17)
BASOPHILS # BLD: 0 % (ref 0–2)
BASOPHILS ABSOLUTE: <0.03 K/UL (ref 0–0.2)
BUN BLDV-MCNC: 9 MG/DL (ref 8–23)
BUN/CREAT BLD: ABNORMAL (ref 9–20)
CALCIUM SERPL-MCNC: 8.9 MG/DL (ref 8.6–10.4)
CHLORIDE BLD-SCNC: 96 MMOL/L (ref 98–107)
CO2: 30 MMOL/L (ref 20–31)
CREAT SERPL-MCNC: 0.49 MG/DL (ref 0.5–0.9)
DIFFERENTIAL TYPE: ABNORMAL
EOSINOPHILS RELATIVE PERCENT: 2 % (ref 1–4)
GFR AFRICAN AMERICAN: >60 ML/MIN
GFR NON-AFRICAN AMERICAN: >60 ML/MIN
GFR SERPL CREATININE-BSD FRML MDRD: ABNORMAL ML/MIN/{1.73_M2}
GFR SERPL CREATININE-BSD FRML MDRD: ABNORMAL ML/MIN/{1.73_M2}
GLUCOSE BLD-MCNC: 90 MG/DL (ref 70–99)
HCT VFR BLD CALC: 29.1 % (ref 36.3–47.1)
HEMOGLOBIN: 8.9 G/DL (ref 11.9–15.1)
IMMATURE GRANULOCYTES: 1 %
LYMPHOCYTES # BLD: 22 % (ref 24–43)
MAGNESIUM: 1.9 MG/DL (ref 1.6–2.6)
MCH RBC QN AUTO: 30.3 PG (ref 25.2–33.5)
MCHC RBC AUTO-ENTMCNC: 30.6 G/DL (ref 28.4–34.8)
MCV RBC AUTO: 99 FL (ref 82.6–102.9)
MONOCYTES # BLD: 11 % (ref 3–12)
NRBC AUTOMATED: 0 PER 100 WBC
PDW BLD-RTO: 14.1 % (ref 11.8–14.4)
PLATELET # BLD: 373 K/UL (ref 138–453)
PLATELET ESTIMATE: ABNORMAL
PMV BLD AUTO: 10.5 FL (ref 8.1–13.5)
POTASSIUM SERPL-SCNC: 3.2 MMOL/L (ref 3.7–5.3)
RBC # BLD: 2.94 M/UL (ref 3.95–5.11)
RBC # BLD: ABNORMAL 10*6/UL
SEG NEUTROPHILS: 64 % (ref 36–65)
SEGMENTED NEUTROPHILS ABSOLUTE COUNT: 4.21 K/UL (ref 1.5–8.1)
SODIUM BLD-SCNC: 133 MMOL/L (ref 135–144)
WBC # BLD: 6.7 K/UL (ref 3.5–11.3)
WBC # BLD: ABNORMAL 10*3/UL

## 2020-11-06 PROCEDURE — 6370000000 HC RX 637 (ALT 250 FOR IP): Performed by: STUDENT IN AN ORGANIZED HEALTH CARE EDUCATION/TRAINING PROGRAM

## 2020-11-06 PROCEDURE — 6370000000 HC RX 637 (ALT 250 FOR IP): Performed by: INTERNAL MEDICINE

## 2020-11-06 PROCEDURE — 94761 N-INVAS EAR/PLS OXIMETRY MLT: CPT

## 2020-11-06 PROCEDURE — 80048 BASIC METABOLIC PNL TOTAL CA: CPT

## 2020-11-06 PROCEDURE — 85025 COMPLETE CBC W/AUTO DIFF WBC: CPT

## 2020-11-06 PROCEDURE — 2700000000 HC OXYGEN THERAPY PER DAY

## 2020-11-06 PROCEDURE — 6370000000 HC RX 637 (ALT 250 FOR IP): Performed by: NURSE PRACTITIONER

## 2020-11-06 PROCEDURE — 94640 AIRWAY INHALATION TREATMENT: CPT

## 2020-11-06 PROCEDURE — 6370000000 HC RX 637 (ALT 250 FOR IP): Performed by: FAMILY MEDICINE

## 2020-11-06 PROCEDURE — 83735 ASSAY OF MAGNESIUM: CPT

## 2020-11-06 PROCEDURE — 99239 HOSP IP/OBS DSCHRG MGMT >30: CPT | Performed by: FAMILY MEDICINE

## 2020-11-06 PROCEDURE — 36415 COLL VENOUS BLD VENIPUNCTURE: CPT

## 2020-11-06 RX ORDER — PREGABALIN 300 MG/1
300 CAPSULE ORAL 2 TIMES DAILY
Qty: 60 CAPSULE | Refills: 0 | Status: ON HOLD | OUTPATIENT
Start: 2020-11-06 | End: 2022-10-18 | Stop reason: HOSPADM

## 2020-11-06 RX ADMIN — LEVOTHYROXINE SODIUM 150 MCG: 75 TABLET ORAL at 06:18

## 2020-11-06 RX ADMIN — PREGABALIN 300 MG: 100 CAPSULE ORAL at 09:05

## 2020-11-06 RX ADMIN — POTASSIUM CHLORIDE 40 MEQ: 1500 TABLET, EXTENDED RELEASE ORAL at 10:29

## 2020-11-06 RX ADMIN — SPIRONOLACTONE 25 MG: 25 TABLET ORAL at 09:05

## 2020-11-06 RX ADMIN — METOPROLOL TARTRATE 25 MG: 25 TABLET ORAL at 09:05

## 2020-11-06 RX ADMIN — STANDARDIZED SENNA CONCENTRATE AND DOCUSATE SODIUM 2 TABLET: 8.6; 5 TABLET ORAL at 09:05

## 2020-11-06 RX ADMIN — MIDODRINE HYDROCHLORIDE 10 MG: 5 TABLET ORAL at 09:05

## 2020-11-06 RX ADMIN — IPRATROPIUM BROMIDE AND ALBUTEROL SULFATE 1 AMPULE: .5; 3 SOLUTION RESPIRATORY (INHALATION) at 06:44

## 2020-11-06 RX ADMIN — AMIODARONE HYDROCHLORIDE 200 MG: 200 TABLET ORAL at 09:05

## 2020-11-06 RX ADMIN — ASPIRIN 81 MG: 81 TABLET, CHEWABLE ORAL at 09:05

## 2020-11-06 RX ADMIN — FAMOTIDINE 20 MG: 20 TABLET, FILM COATED ORAL at 09:05

## 2020-11-06 RX ADMIN — TAMSULOSIN HYDROCHLORIDE 0.4 MG: 0.4 CAPSULE ORAL at 09:05

## 2020-11-06 RX ADMIN — FUROSEMIDE 20 MG: 20 TABLET ORAL at 09:05

## 2020-11-06 ASSESSMENT — ENCOUNTER SYMPTOMS
DIARRHEA: 0
BLOOD IN STOOL: 0
ABDOMINAL PAIN: 0
NAUSEA: 0
SHORTNESS OF BREATH: 1
RHINORRHEA: 0
VOMITING: 0
CONSTIPATION: 0
COUGH: 0
WHEEZING: 0
CHEST TIGHTNESS: 0

## 2020-11-06 ASSESSMENT — PAIN SCALES - GENERAL: PAINLEVEL_OUTOF10: 0

## 2020-11-06 NOTE — PLAN OF CARE
Problem: Skin Integrity:  Goal: Will show no infection signs and symptoms  Description: Will show no infection signs and symptoms  11/6/2020 0820 by Que Akins RN  Outcome: Completed  11/6/2020 0029 by Luis Felipe Freitas RN  Outcome: Ongoing  Goal: Absence of new skin breakdown  Description: Absence of new skin breakdown  11/6/2020 0820 by Que Akins RN  Outcome: Completed  11/6/2020 0029 by Luis Felipe Freitas RN  Outcome: Ongoing     Problem: Falls - Risk of:  Goal: Will remain free from falls  Description: Will remain free from falls  11/6/2020 0820 by Que Akins RN  Outcome: Completed  11/6/2020 0029 by Luis Felipe Freitas RN  Outcome: Ongoing  Goal: Absence of physical injury  Description: Absence of physical injury  11/6/2020 0820 by Que Akins RN  Outcome: Completed  11/6/2020 0029 by Luis Felipe Freitas RN  Outcome: Ongoing     Problem: Pain:  Goal: Pain level will decrease  Description: Pain level will decrease  11/6/2020 0820 by Que kAins RN  Outcome: Completed  11/6/2020 0029 by Luis Felipe Freitas RN  Outcome: Ongoing  Goal: Control of acute pain  Description: Control of acute pain  11/6/2020 0820 by Que Akins RN  Outcome: Completed  11/6/2020 0029 by Luis Felipe Freitas RN  Outcome: Ongoing  Goal: Control of chronic pain  Description: Control of chronic pain  11/6/2020 0820 by Que Akins RN  Outcome: Completed  11/6/2020 0029 by Luis Felipe Freitas RN  Outcome: Ongoing     Problem: OXYGENATION/RESPIRATORY FUNCTION  Goal: Patient will maintain patent airway  11/6/2020 0820 by Que Akins RN  Outcome: Completed  11/6/2020 0029 by Luis Felipe Freitas RN  Outcome: Ongoing  Goal: Patient will achieve/maintain normal respiratory rate/effort  Description: Respiratory rate and effort will be within normal limits for the patient  11/6/2020 0820 by Que Akins RN  Outcome: Completed  11/6/2020 0029 by Luis Felipe Freitas RN  Outcome: Ongoing     Problem: SKIN INTEGRITY  Goal: Skin integrity is maintained or improved  11/6/2020 0820 by Chano De Los Santos RN  Outcome: Completed  11/6/2020 0029 by Nickolas Alejandra RN  Outcome: Ongoing     Problem: Nutrition  Goal: Optimal nutrition therapy  Description: Nutrition Problem #1: Inadequate oral intake  Intervention: Food and/or Nutrient Delivery: Start nutrition as able; if TF, suggest Standard without Fiber goal 65 mL/hr to provide 1872 kcal and 87 g pro/day.   Nutritional Goals: meet % of estimated nutrient needs     11/6/2020 0820 by Chano De Los Santos RN  Outcome: Completed  11/6/2020 0029 by Nickolas Alejandra RN  Outcome: Ongoing     Problem: Confusion - Acute:  Goal: Absence of continued neurological deterioration signs and symptoms  Description: Absence of continued neurological deterioration signs and symptoms  11/6/2020 0820 by Chano De Los Santos RN  Outcome: Completed  11/6/2020 0029 by Nickolas Alejandra RN  Outcome: Ongoing  Goal: Mental status will be restored to baseline  Description: Mental status will be restored to baseline  11/6/2020 0820 by Chano De Los Santos RN  Outcome: Completed  11/6/2020 0029 by Nickolas Alejandra RN  Outcome: Ongoing     Problem: Discharge Planning:  Goal: Ability to perform activities of daily living will improve  Description: Ability to perform activities of daily living will improve  11/6/2020 0820 by Chano De Los Santos RN  Outcome: Completed  11/6/2020 0029 by Nickolas Alejandra RN  Outcome: Ongoing  Goal: Participates in care planning  Description: Participates in care planning  11/6/2020 0820 by Chano De Los Santos RN  Outcome: Completed  11/6/2020 0029 by Nickolas Alejandra RN  Outcome: Ongoing     Problem: Injury - Risk of, Physical Injury:  Goal: Will remain free from falls  Description: Will remain free from falls  11/6/2020 0820 by Chano De Los Santos RN  Outcome: Completed  11/6/2020 0029 by Nickolas Alejandra RN  Outcome: Ongoing  Goal: Absence of physical injury  Description: accurate environmental perceptions  Description: Demonstrates accurate environmental perceptions  11/6/2020 0820 by Vik Martell RN  Outcome: Completed  11/6/2020 0029 by Reece Kirkpatrick RN  Outcome: Ongoing  Goal: Able to distinguish between reality-based and nonreality-based thinking  Description: Able to distinguish between reality-based and nonreality-based thinking  11/6/2020 0820 by Vik Martell RN  Outcome: Completed  11/6/2020 0029 by Reece Kirkpatrick RN  Outcome: Ongoing  Goal: Able to interrupt nonreality-based thinking  Description: Able to interrupt nonreality-based thinking  11/6/2020 0820 by Vik Martell RN  Outcome: Completed  11/6/2020 0029 by Reece Kirkpatrick RN  Outcome: Ongoing     Problem: Sleep Pattern Disturbance:  Goal: Appears well-rested  Description: Appears well-rested  11/6/2020 0820 by Vik Martell RN  Outcome: Completed  11/6/2020 0029 by Reece Kirkpatrick RN  Outcome: Ongoing

## 2020-11-06 NOTE — PLAN OF CARE
Problem: Skin Integrity:  Goal: Will show no infection signs and symptoms  Description: Will show no infection signs and symptoms  11/6/2020 0029 by Terese Fernandez RN  Outcome: Ongoing  11/5/2020 1724 by Li Lizarraga RN  Outcome: Ongoing  Goal: Absence of new skin breakdown  Description: Absence of new skin breakdown  11/6/2020 0029 by Terese Fernandez RN  Outcome: Ongoing  11/5/2020 1724 by Li Lizarraga RN  Outcome: Ongoing     Problem: Falls - Risk of:  Goal: Will remain free from falls  Description: Will remain free from falls  11/6/2020 0029 by Terese Fernandez RN  Outcome: Ongoing  11/5/2020 1724 by Li Lizarraga RN  Outcome: Ongoing  Goal: Absence of physical injury  Description: Absence of physical injury  11/6/2020 0029 by Terese Fernandez RN  Outcome: Ongoing  11/5/2020 1724 by Li Lizarraga RN  Outcome: Ongoing     Problem: Pain:  Goal: Pain level will decrease  Description: Pain level will decrease  11/6/2020 0029 by Terese Fernandez RN  Outcome: Ongoing  11/5/2020 1724 by Li Lizarraga RN  Outcome: Ongoing  Goal: Control of acute pain  Description: Control of acute pain  11/6/2020 0029 by Terese Fernandez RN  Outcome: Ongoing  11/5/2020 1724 by Li Lizarraga RN  Outcome: Ongoing  Goal: Control of chronic pain  Description: Control of chronic pain  11/6/2020 0029 by Terese Fernandez RN  Outcome: Ongoing  11/5/2020 1724 by Li Lizarraga RN  Outcome: Ongoing     Problem: OXYGENATION/RESPIRATORY FUNCTION  Goal: Patient will maintain patent airway  11/6/2020 0029 by Terese Fernandez RN  Outcome: Ongoing  11/5/2020 1724 by Li Lizarraga RN  Outcome: Ongoing  Goal: Patient will achieve/maintain normal respiratory rate/effort  Description: Respiratory rate and effort will be within normal limits for the patient  11/6/2020 0029 by Terese Fernandez RN  Outcome: Ongoing  11/5/2020 1724 by Li Lizarraga RN  Outcome: Ongoing     Problem: SKIN INTEGRITY  Goal: Skin integrity is maintained or improved  11/6/2020 0029 by Simeon Walsh RN  Outcome: Ongoing  11/5/2020 1724 by Tenisha Villegas RN  Outcome: Ongoing     Problem: Nutrition  Goal: Optimal nutrition therapy  Description: Nutrition Problem #1: Inadequate oral intake  Intervention: Food and/or Nutrient Delivery: Start nutrition as able; if TF, suggest Standard without Fiber goal 65 mL/hr to provide 1872 kcal and 87 g pro/day.   Nutritional Goals: meet % of estimated nutrient needs     11/6/2020 0029 by Simeon Walsh RN  Outcome: Ongoing  11/5/2020 1724 by Tenisha Villegas RN  Outcome: Ongoing     Problem: Confusion - Acute:  Goal: Absence of continued neurological deterioration signs and symptoms  Description: Absence of continued neurological deterioration signs and symptoms  11/6/2020 0029 by Simeon Walsh RN  Outcome: Ongoing  11/5/2020 1724 by Tenisha Villegas RN  Outcome: Ongoing  Goal: Mental status will be restored to baseline  Description: Mental status will be restored to baseline  11/6/2020 0029 by Simeon Walsh RN  Outcome: Ongoing  11/5/2020 1724 by Tenisha Villegas RN  Outcome: Ongoing     Problem: Discharge Planning:  Goal: Ability to perform activities of daily living will improve  Description: Ability to perform activities of daily living will improve  11/6/2020 0029 by Simeon Walsh RN  Outcome: Ongoing  11/5/2020 1724 by Tenisha Villegas RN  Outcome: Ongoing  Goal: Participates in care planning  Description: Participates in care planning  11/6/2020 0029 by Simeon Walsh RN  Outcome: Ongoing  11/5/2020 1724 by Tenisha Villegas RN  Outcome: Ongoing     Problem: Injury - Risk of, Physical Injury:  Goal: Will remain free from falls  Description: Will remain free from falls  11/6/2020 0029 by Simeon Walsh RN  Outcome: Ongoing  11/5/2020 1724 by Tenisha Villegas RN  Outcome: Ongoing  Goal: Absence of physical injury  Description: Absence of physical injury  11/6/2020 0029 by Simeon Walsh RN  Outcome: Ongoing  11/5/2020 1724 by Nasrin Stoner RN  Outcome: Ongoing     Problem: Mood - Altered:  Goal: Mood stable  Description: Mood stable  11/6/2020 0029 by Clementina Rogers RN  Outcome: Ongoing  11/5/2020 1724 by Nasrin Stoner RN  Outcome: Ongoing  Goal: Absence of abusive behavior  Description: Absence of abusive behavior  11/6/2020 0029 by Clementina Rogers RN  Outcome: Ongoing  11/5/2020 1724 by Nasrin Stoner RN  Outcome: Ongoing  Goal: Verbalizations of feeling emotionally comfortable while being cared for will increase  Description: Verbalizations of feeling emotionally comfortable while being cared for will increase  11/6/2020 0029 by Clementina Rogers RN  Outcome: Ongoing  11/5/2020 1724 by Nasrin Stoner RN  Outcome: Ongoing     Problem: Psychomotor Activity - Altered:  Goal: Absence of psychomotor disturbance signs and symptoms  Description: Absence of psychomotor disturbance signs and symptoms  11/6/2020 0029 by Clementina Rogers RN  Outcome: Ongoing  11/5/2020 1724 by Nasrin Stoner RN  Outcome: Ongoing     Problem: Sensory Perception - Impaired:  Goal: Demonstrations of improved sensory functioning will increase  Description: Demonstrations of improved sensory functioning will increase  11/6/2020 0029 by Clementina Rogers RN  Outcome: Ongoing  11/5/2020 1724 by Nasrin Stoner RN  Outcome: Ongoing  Goal: Decrease in sensory misperception frequency  Description: Decrease in sensory misperception frequency  11/6/2020 0029 by Clementina Rogers RN  Outcome: Ongoing  11/5/2020 1724 by Nasrin Stoner RN  Outcome: Ongoing  Goal: Able to refrain from responding to false sensory perceptions  Description: Able to refrain from responding to false sensory perceptions  11/6/2020 0029 by Clementina Rogers RN  Outcome: Ongoing  11/5/2020 1724 by Nasrin Stoner RN  Outcome: Ongoing  Goal: Demonstrates accurate environmental perceptions  Description: Demonstrates accurate environmental perceptions  11/6/2020 0029 by Terese Fernandez RN  Outcome: Ongoing  11/5/2020 1724 by Li Lizarraga RN  Outcome: Ongoing  Goal: Able to distinguish between reality-based and nonreality-based thinking  Description: Able to distinguish between reality-based and nonreality-based thinking  11/6/2020 0029 by Terese Fernandez RN  Outcome: Ongoing  11/5/2020 1724 by Li Lizarraga RN  Outcome: Ongoing  Goal: Able to interrupt nonreality-based thinking  Description: Able to interrupt nonreality-based thinking  11/6/2020 0029 by Terese Fernandez RN  Outcome: Ongoing  11/5/2020 1724 by Li Lizarraga RN  Outcome: Ongoing     Problem: Sleep Pattern Disturbance:  Goal: Appears well-rested  Description: Appears well-rested  11/6/2020 0029 by Terese Fernandez RN  Outcome: Ongoing  11/5/2020 1724 by Li Lizarraga RN  Outcome: Ongoing

## 2020-11-06 NOTE — PROGRESS NOTES
[facility]  Office: [address], DO, Nelly Smoker, DO, Demetrius Centers, DO, Carol Brewer Blood, DO, Colonel Elizabeth MD, Jarocho Viera MD, Shirley Ko MD, Angelita Saldana MD, Amadou Connelly MD, Laquita Akers MD, Aaliyah Martinez MD, Sandra Louis MD, Akash Whitt MD, Dejah Galeano, DO, Mini Gama MD, Dick Auguste MD, Alessandro Silva, DO, Sarah Cantrell MD,  Guille Contreras, DO, Pernell Fields MD, Franky Bear MD, Timoteo Huitron, [facility], [facility] G[facility], CNP, Trinity Walsh, CNP, Marcia Wallace, CNS, Tanvi Castañeda, CNP, Ottoniel Castillo, CNP, Delana Spatz, CNP, Skylar Lagos, CNP, Charlene Vidal, CNP, Paz Partida PA-C, Silvana Rothman, [facility], Abena Lights, CNP, Erik Dies, CNP, Xavi Moeller, CNP, Jaciel Button, CNP, Ellie Luong, [facility]      Daily Progress Note     Admit Date: 10/22/2020  Bed/Room No.  7284/2529-24  Admitting Physician : Shirley Ko MD  Code Status :Full Code  Hospital Day:  LOS: 15 days   Chief Complaint:     Altered mental status     Principal Problem:    Severe sepsis [facility])  Active Problems:    Pneumonia of both lungs due to infectious organism    COPD exacerbation [facility])    Acute respiratory failure with hypoxia and hypercapnia (HCC)    Elevated troponin    Encephalopathy    Sepsis with acute hypoxic respiratory failure and septic shock (HCC)    Biventricular congestive heart failure [facility])  Resolved Problems:    * No resolved hospital problems. *    Subjective : Interval History/Significant events :  11/06/20    Patient is breathing OK on supplemental oxygen . Appetite is improved and she denies any cough , expectoration. She did not had any urinary retention last night. Vitals - Stable afebrile  Labs -hypokalemia 3.3, normal kidney function. Normal white count, low hemoglobin. Nursing notes , Consults notes reviewed. Overnight events and updates discussed with Nursing staff .    Background History:         Karrie Salvador Miko Baum is 77 y.o. female  Who was admitted to the hospital on 10/22/2020 for treatment of Severe sepsis ([facility] [address] Patient came to hospital with altered mental status. She was found to have sepsis secondary to bilateral pneumonia. Patient was started on Rocephin and azithromycin. Pneumonia was complicated by acute respiratory failure and treated with BiPAP. Patient has underlying history of COPD depression, GERD, osteoporosis. Patient had worsening of respiratory failure with hypoxia and hypercarbia requiring intubation. Follow-up chest x-ray showed pulmonary edema and very high proBNP. Patient had echocardiogram with reduced ejection fraction 25 to 30%. Patient had cardiac cath on 11/26/2020 and was found to have mild CAD suggestive of nonischemic cardiomyopathy likely from doctors. She also had new onset A. fib with RVR on 10/23/2020. Patient was treated with Lopressor, amiodarone. Patient had shock likely septic versus cardiogenic and was treated with pressors, fluids. Midodrine was started. Sputum culture grew Klebsiella pneumoniae on 10/25/2020. Patient was extubated on 11/2/2020. Rocephin was stopped on 11/3/2020 after completing treatment course.   Patient is having urinary retention and has required straight cath placement     PMH:  Past Medical History:   Diagnosis Date    COPD (chronic obstructive pulmonary disease) ([facility] [address]     Depression     GERD (gastroesophageal reflux disease)     Osteoporosis       Allergies: No Known Allergies   Medications :  tamsulosin, 0.4 mg, Oral, Daily  famotidine, 20 mg, Oral, BID  furosemide, 20 mg, Oral, Daily  ipratropium-albuterol, 1 ampule, Inhalation, 4x daily  metoprolol tartrate, 25 mg, Oral, BID  sennosides-docusate sodium, 2 tablet, Oral, Daily  amiodarone, 200 mg, Oral, BID  midodrine, 10 mg, Oral, TID WC  spironolactone, 25 mg, Oral, Daily  polyethylene glycol, 17 g, Oral, Daily  enoxaparin, 1 mg/kg, Subcutaneous, BID  sodium chloride flush, 10 mL, Intravenous, 2 times per day  aspirin, 81 mg, Oral, Daily  anesthetic and narcotic custom mixture, , Intrathecal, Once  levothyroxine, 150 mcg, Oral, Daily  pregabalin, 300 mg, Oral, BID  sodium chloride flush, 10 mL, Intravenous, 2 times per day        Review of Systems   Review of Systems   Constitutional: Positive for fatigue. Negative for appetite change, fever and unexpected weight change. HENT: Negative for congestion, rhinorrhea and sneezing. Eyes: Negative for visual disturbance. Respiratory: Positive for shortness of breath. Negative for cough, chest tightness and wheezing. Cardiovascular: Negative for chest pain and palpitations. Gastrointestinal: Negative for abdominal pain, blood in stool, constipation, diarrhea, nausea and vomiting. Genitourinary: Negative for dysuria, enuresis, frequency and hematuria. Musculoskeletal: Negative for arthralgias and myalgias. Skin: Negative for rash. Neurological: Negative for dizziness, weakness, light-headedness and headaches. Hematological: Does not bruise/bleed easily. Psychiatric/Behavioral: Negative for dysphoric mood and sleep disturbance. Objective :      Current Vitals : Temp: 97.7 °F (36.5 °C),  Pulse: 81, Resp: 20, BP: 127/60, SpO2: 96 %  Last 24 Hrs Vitals   Patient Vitals for the past 24 hrs:   BP Temp Temp src Pulse Resp SpO2   11/06/20 0644 -- -- -- -- 20 96 %   11/05/20 2034 -- -- -- -- 16 100 %   11/05/20 2000 127/60 97.7 °F (36.5 °C) Oral 81 18 92 %   11/05/20 1612 (!) 112/51 -- -- 83 -- --   11/05/20 1525 -- -- -- -- 18 95 %   11/05/20 1148 -- -- -- -- 18 95 %   11/05/20 0833 (!) 128/55 97.2 °F (36.2 °C) Oral 59 14 99 %     Intake / output   No intake/output data recorded. Physical Exam:  Physical Exam  Vitals signs and nursing note reviewed. Constitutional:       General: She is not in acute distress. Appearance: She is not diaphoretic. Interventions: Nasal cannula in place.    HENT:      Head: Normocephalic and atraumatic. Nose:      Right Sinus: No maxillary sinus tenderness or frontal sinus tenderness. Left Sinus: No maxillary sinus tenderness or frontal sinus tenderness. Mouth/Throat:      Pharynx: No oropharyngeal exudate. Eyes:      General: No scleral icterus. Conjunctiva/sclera: Conjunctivae normal.      Pupils: Pupils are equal, round, and reactive to light. Neck:      Musculoskeletal: Full passive range of motion without pain and neck supple. Thyroid: No thyromegaly. Vascular: No JVD. Cardiovascular:      Rate and Rhythm: Normal rate and regular rhythm. Pulses:           Dorsalis pedis pulses are 2+ on the right side and 2+ on the left side. Heart sounds: Normal heart sounds. No murmur. Pulmonary:      Effort: Pulmonary effort is normal.      Breath sounds: Normal breath sounds. No wheezing or rales. Abdominal:      Palpations: Abdomen is soft. There is no mass. Tenderness: There is no abdominal tenderness. Lymphadenopathy:      Head:      Right side of head: No submandibular adenopathy. Left side of head: No submandibular adenopathy. Cervical: No cervical adenopathy. Skin:     General: Skin is warm. Neurological:      Mental Status: She is alert and oriented to person, place, and time. Motor: No tremor. Psychiatric:         Behavior: Behavior is cooperative.            Laboratory findings:    Recent Labs     11/04/20 0518 11/05/20 0428 11/06/20  0442   WBC 9.1 8.0 6.7   HGB 9.2* 9.7* 8.9*   HCT 29.3* 30.7* 29.1*    370 373     Recent Labs     11/04/20 0518 11/05/20 0428 11/06/20  0442    133* 133*   K 2.9* 3.3* 3.2*   CL 95* 94* 96*   CO2 29 30 30   GLUCOSE 88 92 90   BUN 17 13 9   CREATININE 0.40* 0.46* 0.49*   MG 2.1 2.1 1.9   CALCIUM 8.8 9.1 8.9          Specific Gravity, UA   Date Value Ref Range Status   10/30/2020 1.013 1.005 - 1.030 Final     Protein, UA   Date Value Ref Range Status   10/30/2020 NEGATIVE NEGATIVE Final     RBC, UA   Date Value Ref Range Status   10/30/2020 20 TO 50 0 - 4 /HPF Final     Comment:     Reference range defined for non-centrifuged specimen. Bacteria, UA   Date Value Ref Range Status   10/30/2020 NOT REPORTED None Final     Nitrite, Urine   Date Value Ref Range Status   10/30/2020 NEGATIVE NEGATIVE Final     WBC, UA   Date Value Ref Range Status   10/30/2020 0 TO 2 0 - 5 /HPF Final     Leukocyte Esterase, Urine   Date Value Ref Range Status   10/30/2020 NEGATIVE NEGATIVE Final       Imaging / Clinical Data :-   Xr Abdomen (kub) (single Ap View)    Result Date: 11/2/2020  Gaseous distention of the colon suggesting colonic ileus. Distal large bowel obstruction not excluded. Ct Head Wo Contrast    Result Date: 10/31/2020  No acute intracranial abnormality. MRI may be obtained if clinically indicated. Xr Chest Portable    Result Date: 10/30/2020  1. Endotracheal and enteric tubes remain in place. Interval right IJ central venous catheter with distal tip at the mid SVC. 2.  Diffuse hazy airspace and interstitial opacities in the lungs are slightly increased when compared to the radiograph performed earlier this morning. Xr Chest Portable    Result Date: 10/30/2020  1. Endotracheal tube remains in appropriate position. 2. Bilateral airspace disease again noted, although less prominent. Clinical Course : gradually improving  Assessment and Plan  :        1. Sepsis secondary to gram- negative pneumonia Klebsiella pneumoniae -completed azithromycin and Rocephin. 2. Shock septic versus cardiogenic-improved with antibiotics. Monitor off antibiotic. 3. Nonischemic-cardiomyopathy  -Takotsubo -Aldactone. Lopressor 25 twice daily. Needs life Vest at discharge. Follow up with cardiology as outpatient. 4. Toxic metabolic encephalopathy - seizure activity ruled out with negative LTME. No antiepileptics indicated.   5. Acute Respiratory Failure with hypoxia and hypercarbia - s/p intubation extubated on 11/2/2020. On supplemental oxygen by nasal cannula. 6. Urinary retention-start Flomax. .  7. COPD  8. Paroxysmal atrial fibrillation - treated with Lovenox 1 mg/kg twice daily. Switch to [facility]  at discharge. Continue Lopressor 25 twice daily.         [facility]      Plan and updates discussed with patient ,  answers  explained to satisfaction.    Plan discussed with Staff Kelsie WONG     (Please note that portions of this note were completed with a voice recognition program. Efforts were made to edit the dictations but occasionally words are mis-transcribed.)      Ap Anderson MD  11/6/2020

## 2020-11-06 NOTE — FLOWSHEET NOTE
Assessment:  Spoke to daughter who expressed relief that patient is getting better and may be discharged tomorrow. Says that patient is a fighter and that she is impressed about how she has recovered. Daughter is hopeful and coping well. Intervention:   provided space for daughter to express feelings, thoughts, and concerns    Outcome:  Determined support is available. 11/05/20 1530   Encounter Summary   Services provided to: Family   Referral/Consult From: [address] [facility] Family members   Continue Visiting   (11.5.2020)   Complexity of Encounter Moderate   Length of Encounter 15 minutes   Spiritual Assessment Completed Yes   Routine   Type Initial   Assessment Calm; Approachable;Coping   Intervention Active listening;Explored feelings, thoughts, concerns; Discussed illness/injury and it's impact; Explored coping resources   Outcome Expressed gratitude     Electronically signed by Denny Ivy on 11/6/2020 at 1:33 AM

## 2020-11-07 NOTE — DISCHARGE SUMMARY
[facility]  Office: [phone]  Jennifer Mckeon Blood DO, Simin Valdez MD, Caprice Clark MD, Darryle So MD, Jaclyn Montanez MD, Miguel Ángel Velez MD, Jordin Martel MD, Damion Pinzon MD, Frank Hogan MD, Akash Tidwell MD, Dia Palomino DO, Mark Davenport MD, Lorin Neville MD, Connie Queen DO, Joel Rhoades MD,  Rashmi Gooden DO, Giovany Bangura MD, Chester Hunt MD, Flaquita Leung [facility], [facility] CNP, [place] CNP, [facility], [facility], [place] CNP, Tasneem Forman CNP, Chrsitine Hunter CNP, Vivek Grijalva CNP, Ester Null PA-C, Mckay Teixeira CNP, Gabriel Castorena CNP, [facility], Long Mckeon CNP, Tawana Rubinstein CNP, [facility], [address] [place] [address]      Discharge Summary     Patient ID: Anni Rae  :  1945   MRN: [id number]     ACCOUNT:  [de-identified]   Patient Location : [address] [place] [address]  Patient's PCP: Rustam Soto MD  Admit Date: 10/22/2020   Discharge Date: 2020     Length of Stay: 15  Code Status:  Prior  Admitting Physician: No admitting provider for patient encounter. Discharge Physician: Darryle So, MD     Active Discharge Diagnosis :     Primary Problem  Severe sepsis [facility])      [facility] Problems    Diagnosis Date Noted    Sepsis with acute hypoxic respiratory failure and septic shock ([facility] [address] [A41.9, R65.21, J96.01]     Biventricular congestive heart failure ([facility] [address] [I50.82]     Encephalopathy [G93.40]     Acute respiratory failure with hypoxia and hypercapnia (HCC) [J96.01, J96.02]     Elevated troponin [R77.8]     Severe sepsis ([facility] [address] [A41.9, R65.20] 10/22/2020    [facility] [J44.1] 10/22/2020    Pneumonia of both lungs due to infectious organism [J18.9] 10/05/2018       Admission Condition:  fair     Discharged Condition: stable    Hospital Stay:     Hospital Course:   Anni Rae is a 77 y.o. female who was treated with Lovenox 1 mg/kg twice daily.  Switched to Xareltro  at discharge.  Continue Lopressor 25 twice daily.       Significant Diagnostic Studies:   Labs / Micro:/Radiology  Recent Labs     11/06/20  0442 11/05/20  0428 11/04/20  0518   WBC 6.7 8.0 9.1   HGB 8.9* 9.7* 9.2*   HCT 29.1* 30.7* 29.3*   MCV 99.0 96.5 97.3    370 361     Labs Renal Latest Ref Rng & Units 11/6/2020 11/5/2020 11/4/2020 11/3/2020 11/2/2020   BUN 8 - 23 mg/dL 9 13 17 25(H) 21   Cr 0.50 - 0.90 mg/dL 0.49(L) 0.46(L) 0.40(L) 0.36(L) 0.45(L)   K 3.7 - 5.3 mmol/L 3.2(L) 3. 3(L) 2. 9(LL) 3.5(L) 3. 3(L)   Na 135 - 144 mmol/L 133(L) 133(L) 135 139 143     Lab Results   Component Value Date    ALT 14 10/29/2020    AST 15 10/29/2020    ALKPHOS 68 10/29/2020    BILITOT 0.29 (L) 10/29/2020     Lab Results   Component Value Date    TSH 0.69 10/24/2020     No results found for: HAV, HEPAIGM, HEPBIGM, HEPBCAB, HBEAG, HEPCAB  Lab Results   Component Value Date    COLORU YELLOW 10/30/2020    NITRU NEGATIVE 10/30/2020    GLUCOSEU NEGATIVE 10/30/2020    KETUA NEGATIVE 10/30/2020    UROBILINOGEN Normal 10/30/2020    BILIRUBINUR NEGATIVE 10/30/2020     No results found for: LABA1C  No results found for: EAG  Lab Results   Component Value Date    INR 1.0 10/22/2020    INR 1.0 12/28/2018    INR 0.9 10/08/2018    PROTIME 12.7 10/22/2020    PROTIME 10.0 12/28/2018    PROTIME 9.5 (L) 10/08/2018       Xr Abdomen (kub) (single Ap View)    Result Date: 11/2/2020  Gaseous distention of the colon suggesting colonic ileus. Distal large bowel obstruction not excluded. Consultations:    Consults:     Final Specialist Recommendations/Findings:   IP CONSULT TO CARDIOLOGY  IP CONSULT TO INFECTIOUS DISEASES  IP CONSULT TO DIETITIAN  IP CONSULT TO NEUROCRITICAL CARE      The patient was seen and examined on day of discharge and this discharge summary is in conjunction with any daily progress note from day of discharge.     Discharge plan:     Disposition: counseling as well as medication reconciliation, prescriptions for required medications, discharge plan and follow up. Electronically signed by   Senthil Sanchez MD  11/7/2020        Thank you Dr. Carollee Rinne, MD for the opportunity to be involved in this patient's care.

## 2020-12-07 ENCOUNTER — HOSPITAL ENCOUNTER (OUTPATIENT)
Dept: GENERAL RADIOLOGY | Age: 75
Discharge: HOME | End: 2020-12-09
Payer: COMMERCIAL

## 2020-12-07 ENCOUNTER — HOSPITAL ENCOUNTER (OUTPATIENT)
Age: 75
Discharge: HOME | End: 2020-12-09
Payer: COMMERCIAL

## 2020-12-07 ENCOUNTER — HOSPITAL ENCOUNTER (OUTPATIENT)
Age: 75
Discharge: HOME | End: 2020-12-07
Payer: COMMERCIAL

## 2020-12-07 ENCOUNTER — HOSPITAL ENCOUNTER (OUTPATIENT)
Dept: NON INVASIVE DIAGNOSTICS | Age: 75
Discharge: HOME | End: 2020-12-07
Payer: COMMERCIAL

## 2020-12-07 ENCOUNTER — OFFICE VISIT (OUTPATIENT)
Dept: CARDIOLOGY | Age: 75
End: 2020-12-07
Payer: MEDICARE

## 2020-12-07 VITALS
WEIGHT: 145 LBS | RESPIRATION RATE: 18 BRPM | OXYGEN SATURATION: 99 % | SYSTOLIC BLOOD PRESSURE: 93 MMHG | HEART RATE: 64 BPM | BODY MASS INDEX: 24.75 KG/M2 | HEIGHT: 64 IN | DIASTOLIC BLOOD PRESSURE: 54 MMHG

## 2020-12-07 LAB
ANION GAP SERPL CALCULATED.3IONS-SCNC: 8 MMOL/L (ref 9–17)
BNP INTERPRETATION: ABNORMAL
BUN BLDV-MCNC: 11 MG/DL (ref 8–23)
BUN/CREAT BLD: 14 (ref 9–20)
CALCIUM SERPL-MCNC: 9.8 MG/DL (ref 8.6–10.4)
CHLORIDE BLD-SCNC: 97 MMOL/L (ref 98–107)
CO2: 33 MMOL/L (ref 20–31)
CREAT SERPL-MCNC: 0.8 MG/DL (ref 0.5–0.9)
GFR AFRICAN AMERICAN: >60 ML/MIN
GFR NON-AFRICAN AMERICAN: >60 ML/MIN
GFR SERPL CREATININE-BSD FRML MDRD: ABNORMAL ML/MIN/{1.73_M2}
GFR SERPL CREATININE-BSD FRML MDRD: ABNORMAL ML/MIN/{1.73_M2}
GLUCOSE BLD-MCNC: 93 MG/DL (ref 70–99)
HCT VFR BLD CALC: 30.6 % (ref 36.3–47.1)
HEMOGLOBIN: 9 G/DL (ref 11.9–15.1)
MCH RBC QN AUTO: 29 PG (ref 25.2–33.5)
MCHC RBC AUTO-ENTMCNC: 29.4 G/DL (ref 28.4–34.8)
MCV RBC AUTO: 98.7 FL (ref 82.6–102.9)
NRBC AUTOMATED: 0 PER 100 WBC
PDW BLD-RTO: 13.7 % (ref 11.8–14.4)
PLATELET # BLD: 282 K/UL (ref 138–453)
PMV BLD AUTO: 10.1 FL (ref 8.1–13.5)
POTASSIUM SERPL-SCNC: 4 MMOL/L (ref 3.7–5.3)
PRO-BNP: 304 PG/ML
RBC # BLD: 3.1 M/UL (ref 3.95–5.11)
SODIUM BLD-SCNC: 138 MMOL/L (ref 135–144)
WBC # BLD: 5.9 K/UL (ref 3.5–11.3)

## 2020-12-07 PROCEDURE — 93308 TTE F-UP OR LMTD: CPT

## 2020-12-07 PROCEDURE — G8484 FLU IMMUNIZE NO ADMIN: HCPCS | Performed by: INTERNAL MEDICINE

## 2020-12-07 PROCEDURE — G8427 DOCREV CUR MEDS BY ELIG CLIN: HCPCS | Performed by: INTERNAL MEDICINE

## 2020-12-07 PROCEDURE — 36415 COLL VENOUS BLD VENIPUNCTURE: CPT

## 2020-12-07 PROCEDURE — 85027 COMPLETE CBC AUTOMATED: CPT

## 2020-12-07 PROCEDURE — 99214 OFFICE O/P EST MOD 30 MIN: CPT | Performed by: INTERNAL MEDICINE

## 2020-12-07 PROCEDURE — 1090F PRES/ABSN URINE INCON ASSESS: CPT | Performed by: INTERNAL MEDICINE

## 2020-12-07 PROCEDURE — 71046 X-RAY EXAM CHEST 2 VIEWS: CPT

## 2020-12-07 PROCEDURE — 6360000004 HC RX CONTRAST MEDICATION: Performed by: INTERNAL MEDICINE

## 2020-12-07 PROCEDURE — G8420 CALC BMI NORM PARAMETERS: HCPCS | Performed by: INTERNAL MEDICINE

## 2020-12-07 PROCEDURE — 83880 ASSAY OF NATRIURETIC PEPTIDE: CPT

## 2020-12-07 PROCEDURE — 93010 ELECTROCARDIOGRAM REPORT: CPT | Performed by: INTERNAL MEDICINE

## 2020-12-07 PROCEDURE — 80048 BASIC METABOLIC PNL TOTAL CA: CPT

## 2020-12-07 RX ORDER — FUROSEMIDE 20 MG/1
20 TABLET ORAL DAILY
Qty: 60 TABLET | Refills: 3 | Status: SHIPPED | OUTPATIENT
Start: 2020-12-07 | End: 2021-05-13 | Stop reason: SDUPTHER

## 2020-12-07 RX ADMIN — PERFLUTREN 2.2 MG: 6.52 INJECTION, SUSPENSION INTRAVENOUS at 17:00

## 2020-12-07 NOTE — PATIENT INSTRUCTIONS
SURVEY:    You may be receiving a survey from [employer] regarding your visit today. Please complete the survey to enable us to provide the highest quality of care to you and your family. If you cannot score us a very good on any question, please call the office to discuss how we could have made your experience a very good one. Thank you.     Your MA today was [employer]

## 2020-12-07 NOTE — PROGRESS NOTES
Sophie Roberts am scribing for and in the presence of Diana Solis MD.    Patient: Rach Curtis  : 1945  Date of Visit: 2020    History of Present Illness:        Dear Alejandra Castano MD    REASON FOR VISIT / CONSULTATION:   Chief Complaint   Patient presents with   Pham Krishnan Cardiologist     HX:Biventricular CHF, Pt is here today with her  to [facility] care she had double pnuemonia went septic she had ventilator for 11 days all at [facility] started 10/22/2020. She is currnetly at the [facility] she still feels weak. She does have SOB, lightheaded/dizziness. She is able to walk with help. thinks heart races. Denies:CP       I had the pleasure of seeing Rach Curtis in my office today. Ms. Shante Jaffe is a 77 y.o. female who presented for evaluation and to establish care. She was admitted to [facility] from 10/22/2020 to 2020 because of severe sepsis/septic shock secondary to bilateral pneumonia. She was treated with Rocephin and if this Romycin. Hospital course complicated by acute respiratory failure requiring intubation. Patient also found to have severe left ventricular systolic dysfunction with estimated ejection fraction of 30%. She underwent cardiac catheterization that showed no significant CAD. Patient treated for nonischemic cardiomyopathy and was discharged with LifeVest.  She presented today for follow-up. Other medical problems include COPD, depression, gastroesophageal reflux disease and osteoporosis. During that same hospital stay, she developed atrial fibrillation with rapid ventricular response on 10/23/2020. Audi Gibson was treated with Lopressor, amiodarone.      Ms. Shante Jaffe is here to establish care today for recent hospital admission. She was admitted on 10/22/2020 for pneumonia where she became septic and transferred to [facility]. She was on ventilator for 11 days while recently admitted.  She reports she is very tired and weak.  She is currently at the [facility] and is doing physical therapy. She continues to complain of shortness of breath and is using oxygen all the time. No chest pain, pressure or tightness. I got a limited echo and her ejection fraction was almost back to normal, estimated ejection fraction 50 to 55%. We also got a chest x-ray and blood work. Her BNP is much better. Kidney function and serum potassium is stable. Hemoglobin is 9 g/dL. PAST MEDICAL HISTORY:        Past Medical History:   Diagnosis Date    COPD (chronic obstructive pulmonary disease) ([address]     Depression     GERD (gastroesophageal reflux disease)     Osteoporosis    Nonischemic cardiomyopathy. History of atrial fibrillation. Chronic anticoagulation. Acute on chronic hypoxemic/hypercarbic respiratory failure. CURRENT ALLERGIES: Patient has no known allergies. REVIEW OF SYSTEMS: 14 systems were reviewed. Pertinent positives and negatives as above, all else negative.      Past Surgical History:   Procedure Laterality Date    BACK SURGERY      BREAST SURGERY      CARPAL TUNNEL RELEASE      FRACTURE SURGERY Left 2018    ORIF wrist    HYSTERECTOMY      JOINT REPLACEMENT      right knee    JOINT REPLACEMENT      WRIST FRACTURE SURGERY Left 2018    WRIST OPEN REDUCTION INTERNAL FIXATION-DISTAL RADIUS performed by Evans Lantigua MD at [address] History:  Social History     Tobacco Use    Smoking status: Former Smoker     Packs/day: 1.00     Years: 10.00     Pack years: 10.00     Last attempt to quit: 1976     Years since quittin.1    Smokeless tobacco: Never Used   Substance Use Topics    Alcohol use: No    Drug use: No        CURRENT MEDICATIONS:        Outpatient Medications Marked as Taking for the 20 encounter (Office Visit) with Windy Ortiz MD   Medication Sig Dispense Refill    furosemide (LASIX) 20 MG tablet Take 1 tablet by mouth daily 60 tablet 3    pregabalin (LYRICA) 300 MG capsule Take 1 capsule by mouth 2 times daily for 30 days. 60 capsule 0    metoprolol tartrate (LOPRESSOR) 25 MG tablet Take 1 tablet by mouth 2 times daily 60 tablet 3    midodrine (PROAMATINE) 10 MG tablet Take 1 tablet by mouth 3 times daily (with meals) 90 tablet 3    spironolactone (ALDACTONE) 25 MG tablet Take 1 tablet by mouth daily 30 tablet 3    tamsulosin (FLOMAX) 0.4 MG capsule Take 1 capsule by mouth daily 30 capsule 3    albuterol-ipratropium (COMBIVENT RESPIMAT)  MCG/ACT AERS inhaler Inhale 1 puff into the lungs every 6 hours      rivaroxaban (XARELTO) 20 MG TABS tablet Take 1 tablet by mouth daily (with breakfast) 30 tablet     Hydroxychloroquine Sulfate (PLAQUENIL PO) Take by mouth      NONFORMULARY by Intrathecal route continuous Bupivicaine 5.593 mg/day  Hydromorphone 2.797 mg/day  Refilled 10.06.2020 by Iraj Guzman CNP      albuterol sulfate  (90 Base) MCG/ACT inhaler Inhale 2 puffs into the lungs 4 times daily 1 Inhaler 0    levothyroxine (SYNTHROID) 150 MCG tablet Take 150 mcg by mouth Daily      aspirin 81 MG tablet Take 81 mg by mouth daily      DULoxetine (CYMBALTA) 60 MG extended release capsule Take 60 mg by mouth daily      traZODone (DESYREL) 100 MG tablet Take 200 mg by mouth nightly       pantoprazole sodium (PROTONIX) 40 MG PACK packet Take 40 mg by mouth 2 times daily (before meals)      potassium chloride (KLOR-CON) 20 MEQ packet Take 20 mEq by mouth 2 times daily      calcium citrate-vitamin D (CITRICAL + D) 315-250 MG-UNIT TABS per tablet Take 1 tablet by mouth 2 times daily (with meals)      alendronate (FOSAMAX) 70 MG tablet Take 70 mg by mouth every 7 days         FAMILY HISTORY: family history includes Heart Attack in her sister; Heart Attack (age of onset: 61) in her father; Heart Disease in her brother.      PHYSICAL EXAMINATION:     BP (!) 74/35 (Site: Left Upper Arm, Position: Sitting, Cuff Size: Medium Adult)   Pulse 62   Resp 18   Ht 5' 4\" (1.626 m)   Wt 145 lb (65.8 kg) Comment: verbal  SpO2 99%   BMI 24.89 kg/m²  Body mass index is 24.89 kg/m². Constitutional: She is oriented to person, place, and time. She appears well-developed and well-nourished. In no acute distress. HEENT: Normocephalic and atraumatic. No JVD present. Carotid bruit is not present. No mass and no thyromegaly present. No lymphadenopathy present. Cardiovascular: Normal rate, regular rhythm, normal heart sounds. Exam reveals no gallop and no friction rubs. No murmur was heard. .  Pulmonary/Chest: Effort normal and breath sounds normal. No respiratory distress. She has no wheezes, rhonchi or rales. Abdominal: Soft, non-tender. Bowel sounds and aorta are normal. She exhibits no organomegaly, mass or bruit. Extremities: Trace. No cyanosis or clubbing. 2+ radial and carotid pulses. Distal extremity pulses: 2+ bilaterally. Neurological: She is alert and oriented to person, place, and time. No evidence of gross cranial nerve deficit. Coordination appeared normal.   Skin: Skin is warm and dry. There is no rash or diaphoresis. Psychiatric: She has a normal mood and affect.  Her speech is normal and behavior is normal.      MOST RECENT LABS ON RECORD:   Lab Results   Component Value Date    WBC 5.9 12/07/2020    HGB 9.0 (L) 12/07/2020    HCT 30.6 (L) 12/07/2020     12/07/2020    TRIG 153 (H) 10/24/2020    ALT 14 10/29/2020    AST 15 10/29/2020     12/07/2020    K 4.0 12/07/2020    CL 97 (L) 12/07/2020    CREATININE 0.80 12/07/2020    BUN 11 12/07/2020    CO2 33 (H) 12/07/2020    TSH 0.69 10/24/2020    INR 1.0 10/22/2020       ASSESSMENT:     Patient Active Problem List    Diagnosis Date Noted    Acute metabolic encephalopathy 11/44/4147     Priority: High     Class: Acute    Sepsis with acute hypoxic respiratory failure and septic shock (HCC)     Biventricular congestive heart failure (HCC)     Encephalopathy     Acute respiratory failure with hypoxia and hypercapnia [facility]     Severe sepsis ([facility] 10/22/2020    COPD exacerbation [facility] 10/22/2020    Sepsis due to pneumonia ([facility] 08/16/2020    Lactic acidosis 08/16/2020    Septic shock ([facility] 08/16/2020    Status post surgery 12/20/2018    Pneumonia of both lungs due to infectious organism 10/05/2018    Hypothyroidism 10/05/2018    Major depressive disorder 10/05/2018    Chronic midline low back pain without sciatica 10/05/2018    Iron deficiency anemia 10/05/2018       PLAN:         Chronic combined heart failure:    Beta Blocker: Continue Metoprolol tartrate (Lopressor) 25 mg bid.  ACE Inibitor/ARB: I will hold off ACE inhibitor for now because her ejection fraction is preserved. Patient is also recovering from acute on chronic kidney injury.  Diuretics: DECREASE to furosemide (Lasix) 20 mg every morning.  Heart failure counseling: I advised them to try and keep their legs up whenever possible and to limit salt in their diet.  Additional Testing List: I ordered a echocardiogram to better assess for the etiology of this problem and to help guide future management    Discontinue LifeVest today, patient fraction improved to 50%.  I will have our heart failure nurse call the [facility] with inquiries about heart rate, blood pressure, weight and patient symptoms. History of Atrial Fibrillation:  10/23/2020 with RVR. This happened in the setting of sepsis. She is currently bradycardic at rest.  Beta Blocker: Continue metoprolol tartrate (Lopressor) 25 mg twice daily. Calcium Channel Blocker: Not indicated      Rhythm Control Agent: Stop Amiodarone (Cordarone)     Stroke Risk: Her CHADS2-VASc score is greater than 2 (2.2% stroke risk)  Anticoagulation: Continue Riveroxiban (Xarelto) 20 mg daily. I also reminded her to watch for signs of blood in her stool or black tarry stools and stop the medication immediately if this develops as this could be life threatening.   Additional Testing: I Ordered an Echocardiogram to assess Ms. South's ejection fraction and to look for significant valvular heart disease as a source of Ms. South symptoms    · Pneumonia/COPD and chronic hypoxemic respiratory failure  · Continue home oxygen therapy  · Follow-up repeat chest x-ray  · Laboratory testing: BNP,CBC,BMP       Finally, I recommended that she continue her other medications and follow up with you as previously scheduled. FOLLOW UP:   I told Ms. South to call my office if she had any problems, but otherwise I asked her to Return in about 6 weeks (around 1/18/2021). However, I would be happy to see her sooner should the need arise. Sincerely,  Julius Dang MD, F.A.C.C. [facility] Card[facility] Specialist    [address] [place] [address]  Phone: [phone], Fax: [phone]     I believe that the risk of significant morbidity and mortality related to the patient's current medical conditions are: high. >60 minutes were spent with the patient and all of their questions were answered. The documentation recorded by the scribe, accurately and completely reflects the services I personally performed and the decisions made by me. Julius Dang MD, F.A.C.C.  December 7, 2020

## 2020-12-08 ENCOUNTER — TELEPHONE (OUTPATIENT)
Dept: CARDIOLOGY | Age: 75
End: 2020-12-08

## 2020-12-08 NOTE — TELEPHONE ENCOUNTER
----- Message from Sebastian Dillard MD sent at 12/7/2020  8:20 PM EST -----  Blood work is good. Continue current therapy and follow-up.   Thank you

## 2020-12-18 ENCOUNTER — HOSPITAL ENCOUNTER (EMERGENCY)
Age: 75
Discharge: HOME | End: 2020-12-18
Attending: EMERGENCY MEDICINE
Payer: MEDICARE

## 2020-12-18 VITALS
SYSTOLIC BLOOD PRESSURE: 118 MMHG | OXYGEN SATURATION: 93 % | RESPIRATION RATE: 18 BRPM | TEMPERATURE: 98.4 F | DIASTOLIC BLOOD PRESSURE: 59 MMHG | HEART RATE: 77 BPM

## 2020-12-18 PROCEDURE — 30901 CONTROL OF NOSEBLEED: CPT

## 2020-12-18 PROCEDURE — 6370000000 HC RX 637 (ALT 250 FOR IP): Performed by: EMERGENCY MEDICINE

## 2020-12-18 PROCEDURE — 99284 EMERGENCY DEPT VISIT MOD MDM: CPT

## 2020-12-18 PROCEDURE — 2500000003 HC RX 250 WO HCPCS: Performed by: EMERGENCY MEDICINE

## 2020-12-18 RX ORDER — OXYMETAZOLINE HYDROCHLORIDE 0.05 G/100ML
2 SPRAY NASAL ONCE
Status: COMPLETED | OUTPATIENT
Start: 2020-12-18 | End: 2020-12-18

## 2020-12-18 RX ORDER — LIDOCAINE HYDROCHLORIDE AND EPINEPHRINE BITARTRATE 20; .01 MG/ML; MG/ML
20 INJECTION, SOLUTION SUBCUTANEOUS ONCE
Status: COMPLETED | OUTPATIENT
Start: 2020-12-18 | End: 2020-12-18

## 2020-12-18 RX ADMIN — OXYMETAZOLINE HYDROCHLORIDE 2 SPRAY: 0.05 SPRAY NASAL at 21:14

## 2020-12-18 RX ADMIN — LIDOCAINE HYDROCHLORIDE,EPINEPHRINE BITARTRATE 20 ML: 20; .01 INJECTION, SOLUTION INFILTRATION; PERINEURAL at 21:14

## 2020-12-18 ASSESSMENT — PAIN SCALES - GENERAL: PAINLEVEL_OUTOF10: 0

## 2020-12-19 NOTE — ED PROVIDER NOTES
[facility] ED  EMERGENCY DEPARTMENT ENCOUNTER      Pt Name: Michael Sanchez  MRN: [id number]  [place] 1945  Date of evaluation: 12/18/2020  Provider: Guillermina Roberts MD    CHIEF COMPLAINT       Chief Complaint   Patient presents with    Epistaxis     left nare, onset at approx 1200         HISTORY OF PRESENT ILLNESS   (Location/Symptom, Timing/Onset, Context/Setting, Quality, Duration, Modifying Factors, Severity)  Note limiting factors. Michael Sanchez is a 77 y.o. female who presents to the emergency department      73-year-old female presented emergency department for evaluation of nosebleed. Patient has had trickling blood from her left naris since around noon today. States she is also coughed up a few large clots. Denies any weakness dizziness or lightheadedness. Patient currently is on a blood thinner and she also uses supplemental O2 and does not currently have a humidifier. Recently hospitalized and intubated for pneumonia and sepsis. She just recently returned home from her nursing facility. She had been doing well until today. She denies any URI symptoms. She does not have any headaches. Denies any cough chest pain shortness of breath. She was evaluated by her home health care nurse and was instructed to come to the emergency department for evaluation. Denies any previous similar episodes. Nursing Notes were reviewed. REVIEW OF SYSTEMS    (2-9 systems for level 4, 10 or more for level 5)     Review of Systems   All other systems reviewed and are negative. Except as noted above the remainder of the review of systems was reviewed and negative.        PAST MEDICAL HISTORY     Past Medical History:   Diagnosis Date    COPD (chronic obstructive pulmonary disease) ([address]     Depression     GERD (gastroesophageal reflux disease)     Osteoporosis          SURGICAL HISTORY       Past Surgical History:   Procedure Laterality Date    BACK SURGERY  BREAST SURGERY      CARPAL TUNNEL RELEASE      FRACTURE SURGERY Left 12/20/2018    ORIF wrist    HYSTERECTOMY      JOINT REPLACEMENT      right knee    JOINT REPLACEMENT      WRIST FRACTURE SURGERY Left 12/20/2018    WRIST OPEN REDUCTION INTERNAL FIXATION-DISTAL RADIUS performed by Jelani Andre MD at [address] [place] [address]       Discharge Medication List as of 12/18/2020  9:07 PM      CONTINUE these medications which have NOT CHANGED    Details   furosemide (LASIX) 20 MG tablet Take 1 tablet by mouth daily, Disp-60 tablet,R-3Normal      pregabalin (LYRICA) 300 MG capsule Take 1 capsule by mouth 2 times daily for 30 days. , Disp-60 capsule, R-0Print      metoprolol tartrate (LOPRESSOR) 25 MG tablet Take 1 tablet by mouth 2 times daily, Disp-60 tablet,R-3Normal      midodrine (PROAMATINE) 10 MG tablet Take 1 tablet by mouth 3 times daily (with meals), Disp-90 tablet,R-3Normal      spironolactone (ALDACTONE) 25 MG tablet Take 1 tablet by mouth daily, Disp-30 tablet,R-3Normal      tamsulosin (FLOMAX) 0.4 MG capsule Take 1 capsule by mouth daily, Disp-30 capsule,R-3Normal      albuterol-ipratropium (COMBIVENT RESPIMAT)  MCG/ACT AERS inhaler Inhale 1 puff into the lungs every 6 hoursDC to SNF      rivaroxaban (XARELTO) 20 MG TABS tablet Take 1 tablet by mouth daily (with breakfast), Disp-30 tabletDC to SNF      Hydroxychloroquine Sulfate (PLAQUENIL PO) Take by mouthHistorical Med      albuterol sulfate  (90 Base) MCG/ACT inhaler Inhale 2 puffs into the lungs 4 times daily, Disp-1 Inhaler, R-0Normal      levothyroxine (SYNTHROID) 150 MCG tablet Take 150 mcg by mouth DailyHistorical Med      aspirin 81 MG tablet Take 81 mg by mouth dailyHistorical Med      DULoxetine (CYMBALTA) 60 MG extended release capsule Take 60 mg by mouth dailyHistorical Med      traZODone (DESYREL) 100 MG tablet Take 200 mg by mouth nightly Historical Med SCREENINGS                        PHYSICAL EXAM    (up to 7 for level 4, 8 or more for level 5)     ED Triage Vitals   BP Temp Temp Source Pulse Resp SpO2 Height Weight   12/18/20 1827 12/18/20 1827 12/18/20 1827 12/18/20 1827 12/18/20 1827 12/18/20 1829 -- --   (!) 161/76 98.4 °F (36.9 °C) Tympanic 87 18 94 %         Physical Exam  Vitals signs and nursing note reviewed. Constitutional:       General: She is not in acute distress. Appearance: She is not toxic-appearing. Comments: Elderly female. Afebrile. HENT:      Head: Normocephalic and atraumatic. Mouth/Throat:      Comments: Lips do appear dry. Oral mucosa is moist  Neck:      Musculoskeletal: Normal range of motion and neck supple. Cardiovascular:      Rate and Rhythm: Normal rate and regular rhythm. Pulmonary:      Effort: Pulmonary effort is normal. No respiratory distress. Breath sounds: Normal breath sounds. Musculoskeletal: Normal range of motion. Skin:     General: Skin is warm and dry. Neurological:      General: No focal deficit present. Mental Status: She is alert and oriented to person, place, and time. Psychiatric:         Mood and Affect: Mood normal.         DIAGNOSTIC RESULTS     EKG: All EKG's are interpreted by the Emergency Department Physician who either signs or Co-signs this chart in the absence of a cardiologist.        RADIOLOGY:   Non-plain film images such as CT, Ultrasound and MRI are read by the radiologist. Plain radiographic images are visualized and preliminarily interpreted by the emergency physician with the below findings:        Interpretation per the Radiologist below, if available at the time of this note:    No orders to display         ED BEDSIDE ULTRASOUND:   Performed by ED Physician - none    LABS:  Labs Reviewed - No data to display    All other labs were within normal range or not returned as of this dictation.     EMERGENCY DEPARTMENT COURSE and DIFFERENTIAL DIAGNOSIS/MDM: Vitals:    Vitals:    12/18/20 1827 12/18/20 1829 12/18/20 1845 12/18/20 2115   BP: (!) 161/76  112/61 (!) 118/59   Pulse: 87  84 77   Resp: 18 18 18   Temp: 98.4 °F (36.9 °C)      TempSrc: Tympanic      SpO2:  94% 93%            MDM  Number of Diagnoses or Management Options  Diagnosis management comments: 2 x 2 treated with 2% lidocaine and epinephrine applied to left naris for 20 minutes. Pledget removed. Patient was noted to have a few small anterior naris blood vessels that were oozing a small amount of blood. The sprays of Afrin nasal spray were applied. Patient observed without any recurrent bleeding. She does not currently have humidification on her oxygen concentrator at home. Nursing staff discussed with the patient's son and he will call the oxygen supply company in order to ensure that there is defecation with her home oxygen supply. Patient is stable for discharge home. Return and follow-up instructions discussed with patient and her . REASSESSMENT          CRITICAL CARE TIME   Total Critical Care time was  minutes, excluding separately reportable procedures. There was a high probability of clinically significant/life threatening deterioration in the patient's condition which required my urgent intervention.       CONSULTS:  None    PROCEDURES:  Unless otherwise noted below, none     Epistaxis Mgmt    Date/Time: 12/18/2020 9:04 PM  Performed by: Saqib Hooper MD  Authorized by: Saqib Hooper MD     Consent:     Consent obtained:  Verbal            FINAL IMPRESSION      1. Epistaxis Controlled         DISPOSITION/PLAN   DISPOSITION Decision To Discharge 12/18/2020 09:02:07 PM      PATIENT REFERRED TO:  Pastor Yosi MD  [address] [facility]  [phone]      Reminded to schedule appointment      DISCHARGE MEDICATIONS:  Discharge Medication List as of 12/18/2020  9:07 PM        Controlled Substances Monitoring:     RX Monitoring 12/17/2018 Attestation The Prescription Monitoring Report for this patient was reviewed today. Periodic Controlled Substance Monitoring No signs of potential drug abuse or diversion identified.        (Please note that portions of this note were completed with a voice recognition program.  Efforts were made to edit the dictations but occasionally words are mis-transcribed.)    Saqib Hooper MD (electronically signed)  Attending Emergency Physician          Saqib Hopoer MD  12/19/20 [id number]

## 2020-12-19 NOTE — ED NOTES
Writer spoke with patients son. Per Dr. Meenu Lacy provided with humidifier for home O2 machine. Write explained how to hook it up if there is a port available. Son also verbalized understanding to contact oxygen supplier to see if humidification can be provided.       La Nelson RN  12/18/20 2013

## 2020-12-26 ENCOUNTER — HOSPITAL ENCOUNTER (INPATIENT)
Age: 75
LOS: 3 days | Discharge: HOME | DRG: 177 | End: 2020-12-29
Attending: FAMILY MEDICINE | Admitting: INTERNAL MEDICINE
Payer: MEDICARE

## 2020-12-26 ENCOUNTER — APPOINTMENT (OUTPATIENT)
Dept: GENERAL RADIOLOGY | Age: 75
DRG: 177 | End: 2020-12-26
Payer: MEDICARE

## 2020-12-26 PROBLEM — J12.82 PNEUMONIA DUE TO COVID-19 VIRUS: Status: ACTIVE | Noted: 2020-12-26

## 2020-12-26 PROBLEM — U07.1 PNEUMONIA DUE TO COVID-19 VIRUS: Status: ACTIVE | Noted: 2020-12-26

## 2020-12-26 LAB
ABSOLUTE EOS #: 0 K/UL (ref 0–0.44)
ABSOLUTE EOS #: 0 K/UL (ref 0–0.44)
ABSOLUTE IMMATURE GRANULOCYTE: 0 K/UL (ref 0–0.3)
ABSOLUTE IMMATURE GRANULOCYTE: 0 K/UL (ref 0–0.3)
ABSOLUTE LYMPH #: 0.42 K/UL (ref 1.1–3.7)
ABSOLUTE LYMPH #: 0.49 K/UL (ref 1.1–3.7)
ABSOLUTE MONO #: 0.36 K/UL (ref 0.1–1.2)
ABSOLUTE MONO #: 0.41 K/UL (ref 0.1–1.2)
ALBUMIN SERPL-MCNC: 3.1 G/DL (ref 3.5–5.2)
ALBUMIN SERPL-MCNC: 3.2 G/DL (ref 3.5–5.2)
ALBUMIN/GLOBULIN RATIO: 1 (ref 1–2.5)
ALBUMIN/GLOBULIN RATIO: 1 (ref 1–2.5)
ALP BLD-CCNC: 51 U/L (ref 35–104)
ALP BLD-CCNC: 53 U/L (ref 35–104)
ALT SERPL-CCNC: 14 U/L (ref 5–33)
ALT SERPL-CCNC: 15 U/L (ref 5–33)
ANION GAP SERPL CALCULATED.3IONS-SCNC: 7 MMOL/L (ref 9–17)
ANION GAP SERPL CALCULATED.3IONS-SCNC: 7 MMOL/L (ref 9–17)
AST SERPL-CCNC: 25 U/L
AST SERPL-CCNC: 25 U/L
BASOPHILS # BLD: 0 % (ref 0–2)
BASOPHILS # BLD: 0 % (ref 0–2)
BASOPHILS ABSOLUTE: 0 K/UL (ref 0–0.2)
BASOPHILS ABSOLUTE: 0 K/UL (ref 0–0.2)
BILIRUB SERPL-MCNC: 0.15 MG/DL (ref 0.3–1.2)
BILIRUB SERPL-MCNC: 0.22 MG/DL (ref 0.3–1.2)
BUN BLDV-MCNC: 9 MG/DL (ref 8–23)
BUN BLDV-MCNC: 9 MG/DL (ref 8–23)
BUN/CREAT BLD: 13 (ref 9–20)
BUN/CREAT BLD: 16 (ref 9–20)
C-REACTIVE PROTEIN: 60.7 MG/L (ref 0–5)
CALCIUM SERPL-MCNC: 8.8 MG/DL (ref 8.6–10.4)
CALCIUM SERPL-MCNC: 9.2 MG/DL (ref 8.6–10.4)
CHLORIDE BLD-SCNC: 98 MMOL/L (ref 98–107)
CHLORIDE BLD-SCNC: 98 MMOL/L (ref 98–107)
CO2: 29 MMOL/L (ref 20–31)
CO2: 31 MMOL/L (ref 20–31)
CREAT SERPL-MCNC: 0.58 MG/DL (ref 0.5–0.9)
CREAT SERPL-MCNC: 0.67 MG/DL (ref 0.5–0.9)
D-DIMER QUANTITATIVE: 1.8 MG/L FEU (ref 0–0.59)
DIFFERENTIAL TYPE: ABNORMAL
DIFFERENTIAL TYPE: ABNORMAL
EOSINOPHILS RELATIVE PERCENT: 0 % (ref 1–4)
EOSINOPHILS RELATIVE PERCENT: 0 % (ref 1–4)
FERRITIN: 46 UG/L (ref 13–150)
FIBRINOGEN: 333 MG/DL (ref 179–518)
GFR AFRICAN AMERICAN: >60 ML/MIN
GFR AFRICAN AMERICAN: >60 ML/MIN
GFR NON-AFRICAN AMERICAN: >60 ML/MIN
GFR NON-AFRICAN AMERICAN: >60 ML/MIN
GFR SERPL CREATININE-BSD FRML MDRD: ABNORMAL ML/MIN/{1.73_M2}
GLUCOSE BLD-MCNC: 102 MG/DL (ref 70–99)
GLUCOSE BLD-MCNC: 114 MG/DL (ref 70–99)
HCT VFR BLD CALC: 25.8 % (ref 36.3–47.1)
HCT VFR BLD CALC: 26.8 % (ref 36.3–47.1)
HEMOGLOBIN: 7.9 G/DL (ref 11.9–15.1)
HEMOGLOBIN: 8 G/DL (ref 11.9–15.1)
IMMATURE GRANULOCYTES: 0 %
IMMATURE GRANULOCYTES: 0 %
INR BLD: 1.1
LACTATE DEHYDROGENASE: 178 U/L (ref 135–214)
LACTIC ACID, WHOLE BLOOD: NORMAL MMOL/L (ref 0.7–2.1)
LACTIC ACID: 0.7 MMOL/L (ref 0.5–2.2)
LYMPHOCYTES # BLD: 6 % (ref 24–43)
LYMPHOCYTES # BLD: 8 % (ref 24–43)
MCH RBC QN AUTO: 27.9 PG (ref 25.2–33.5)
MCH RBC QN AUTO: 28 PG (ref 25.2–33.5)
MCHC RBC AUTO-ENTMCNC: 29.9 G/DL (ref 28.4–34.8)
MCHC RBC AUTO-ENTMCNC: 30.6 G/DL (ref 28.4–34.8)
MCV RBC AUTO: 91.2 FL (ref 82.6–102.9)
MCV RBC AUTO: 93.7 FL (ref 82.6–102.9)
MONOCYTES # BLD: 5 % (ref 3–12)
MONOCYTES # BLD: 7 % (ref 3–12)
MORPHOLOGY: NORMAL
MORPHOLOGY: NORMAL
NRBC AUTOMATED: 0 PER 100 WBC
NRBC AUTOMATED: 0 PER 100 WBC
PARTIAL THROMBOPLASTIN TIME: 36.3 SEC (ref 23.9–33.8)
PDW BLD-RTO: 14.5 % (ref 11.8–14.4)
PDW BLD-RTO: 14.5 % (ref 11.8–14.4)
PLATELET # BLD: 203 K/UL (ref 138–453)
PLATELET # BLD: 207 K/UL (ref 138–453)
PLATELET ESTIMATE: ABNORMAL
PLATELET ESTIMATE: ABNORMAL
PMV BLD AUTO: 10.4 FL (ref 8.1–13.5)
PMV BLD AUTO: 10.4 FL (ref 8.1–13.5)
POTASSIUM SERPL-SCNC: 4 MMOL/L (ref 3.7–5.3)
POTASSIUM SERPL-SCNC: 4.2 MMOL/L (ref 3.7–5.3)
PROTHROMBIN TIME: 13.9 SEC (ref 11.5–14.2)
RBC # BLD: 2.83 M/UL (ref 3.95–5.11)
RBC # BLD: 2.86 M/UL (ref 3.95–5.11)
RBC # BLD: ABNORMAL 10*6/UL
RBC # BLD: ABNORMAL 10*6/UL
SARS-COV-2, RAPID: DETECTED
SARS-COV-2: ABNORMAL
SARS-COV-2: ABNORMAL
SEG NEUTROPHILS: 85 % (ref 36–65)
SEG NEUTROPHILS: 89 % (ref 36–65)
SEGMENTED NEUTROPHILS ABSOLUTE COUNT: 4.42 K/UL (ref 1.5–8.1)
SEGMENTED NEUTROPHILS ABSOLUTE COUNT: 7.3 K/UL (ref 1.5–8.1)
SODIUM BLD-SCNC: 134 MMOL/L (ref 135–144)
SODIUM BLD-SCNC: 136 MMOL/L (ref 135–144)
SOURCE: ABNORMAL
TOTAL PROTEIN: 6.3 G/DL (ref 6.4–8.3)
TOTAL PROTEIN: 6.3 G/DL (ref 6.4–8.3)
TROPONIN INTERP: NORMAL
TROPONIN T: NORMAL NG/ML
TROPONIN, HIGH SENSITIVITY: 14 NG/L (ref 0–14)
WBC # BLD: 5.2 K/UL (ref 3.5–11.3)
WBC # BLD: 8.2 K/UL (ref 3.5–11.3)
WBC # BLD: ABNORMAL 10*3/UL
WBC # BLD: ABNORMAL 10*3/UL

## 2020-12-26 PROCEDURE — 86850 RBC ANTIBODY SCREEN: CPT

## 2020-12-26 PROCEDURE — U0003 INFECTIOUS AGENT DETECTION BY NUCLEIC ACID (DNA OR RNA); SEVERE ACUTE RESPIRATORY SYNDROME CORONAVIRUS 2 (SARS-COV-2) (CORONAVIRUS DISEASE [COVID-19]), AMPLIFIED PROBE TECHNIQUE, MAKING USE OF HIGH THROUGHPUT TECHNOLOGIES AS DESCRIBED BY CMS-2020-01-R: HCPCS

## 2020-12-26 PROCEDURE — 83615 LACTATE (LD) (LDH) ENZYME: CPT

## 2020-12-26 PROCEDURE — 94761 N-INVAS EAR/PLS OXIMETRY MLT: CPT

## 2020-12-26 PROCEDURE — 85610 PROTHROMBIN TIME: CPT

## 2020-12-26 PROCEDURE — 86901 BLOOD TYPING SEROLOGIC RH(D): CPT

## 2020-12-26 PROCEDURE — 85730 THROMBOPLASTIN TIME PARTIAL: CPT

## 2020-12-26 PROCEDURE — 86140 C-REACTIVE PROTEIN: CPT

## 2020-12-26 PROCEDURE — 2000000000 HC ICU R&B

## 2020-12-26 PROCEDURE — XW033E5 INTRODUCTION OF REMDESIVIR ANTI-INFECTIVE INTO PERIPHERAL VEIN, PERCUTANEOUS APPROACH, NEW TECHNOLOGY GROUP 5: ICD-10-PCS | Performed by: FAMILY MEDICINE

## 2020-12-26 PROCEDURE — 2580000003 HC RX 258: Performed by: FAMILY MEDICINE

## 2020-12-26 PROCEDURE — 6370000000 HC RX 637 (ALT 250 FOR IP): Performed by: INTERNAL MEDICINE

## 2020-12-26 PROCEDURE — 85025 COMPLETE CBC W/AUTO DIFF WBC: CPT

## 2020-12-26 PROCEDURE — 2580000003 HC RX 258: Performed by: INTERNAL MEDICINE

## 2020-12-26 PROCEDURE — 85379 FIBRIN DEGRADATION QUANT: CPT

## 2020-12-26 PROCEDURE — 99285 EMERGENCY DEPT VISIT HI MDM: CPT

## 2020-12-26 PROCEDURE — 85384 FIBRINOGEN ACTIVITY: CPT

## 2020-12-26 PROCEDURE — 84484 ASSAY OF TROPONIN QUANT: CPT

## 2020-12-26 PROCEDURE — 36415 COLL VENOUS BLD VENIPUNCTURE: CPT

## 2020-12-26 PROCEDURE — 86900 BLOOD TYPING SEROLOGIC ABO: CPT

## 2020-12-26 PROCEDURE — 2500000003 HC RX 250 WO HCPCS: Performed by: FAMILY MEDICINE

## 2020-12-26 PROCEDURE — 94640 AIRWAY INHALATION TREATMENT: CPT

## 2020-12-26 PROCEDURE — 71045 X-RAY EXAM CHEST 1 VIEW: CPT

## 2020-12-26 PROCEDURE — U0002 COVID-19 LAB TEST NON-CDC: HCPCS

## 2020-12-26 PROCEDURE — 2700000000 HC OXYGEN THERAPY PER DAY

## 2020-12-26 PROCEDURE — 6360000002 HC RX W HCPCS: Performed by: INTERNAL MEDICINE

## 2020-12-26 PROCEDURE — 80053 COMPREHEN METABOLIC PANEL: CPT

## 2020-12-26 PROCEDURE — 83605 ASSAY OF LACTIC ACID: CPT

## 2020-12-26 PROCEDURE — 82728 ASSAY OF FERRITIN: CPT

## 2020-12-26 RX ORDER — IVERMECTIN 3 MG/1
12 TABLET ORAL ONCE
Status: COMPLETED | OUTPATIENT
Start: 2020-12-26 | End: 2020-12-26

## 2020-12-26 RX ORDER — PROMETHAZINE HYDROCHLORIDE 25 MG/1
12.5 TABLET ORAL EVERY 6 HOURS PRN
Status: DISCONTINUED | OUTPATIENT
Start: 2020-12-26 | End: 2020-12-26 | Stop reason: SDUPTHER

## 2020-12-26 RX ORDER — HYDROXYCHLOROQUINE SULFATE 200 MG/1
200 TABLET, FILM COATED ORAL DAILY
Status: DISCONTINUED | OUTPATIENT
Start: 2020-12-26 | End: 2020-12-26

## 2020-12-26 RX ORDER — SODIUM CHLORIDE 0.9 % (FLUSH) 0.9 %
10 SYRINGE (ML) INJECTION EVERY 12 HOURS SCHEDULED
Status: DISCONTINUED | OUTPATIENT
Start: 2020-12-26 | End: 2020-12-29 | Stop reason: HOSPADM

## 2020-12-26 RX ORDER — ASCORBIC ACID 500 MG
1000 TABLET ORAL 2 TIMES DAILY
Status: DISCONTINUED | OUTPATIENT
Start: 2020-12-26 | End: 2020-12-29 | Stop reason: HOSPADM

## 2020-12-26 RX ORDER — VITAMIN B COMPLEX
2000 TABLET ORAL DAILY
Status: DISCONTINUED | OUTPATIENT
Start: 2020-12-26 | End: 2020-12-26 | Stop reason: SDUPTHER

## 2020-12-26 RX ORDER — TAMSULOSIN HYDROCHLORIDE 0.4 MG/1
0.4 CAPSULE ORAL DAILY
Status: DISCONTINUED | OUTPATIENT
Start: 2020-12-26 | End: 2020-12-29 | Stop reason: HOSPADM

## 2020-12-26 RX ORDER — HYDROCODONE BITARTRATE AND ACETAMINOPHEN 10; 325 MG/1; MG/1
1 TABLET ORAL EVERY 6 HOURS PRN
Status: ON HOLD | COMMUNITY
End: 2022-08-29

## 2020-12-26 RX ORDER — PANTOPRAZOLE SODIUM 40 MG/1
40 TABLET, DELAYED RELEASE ORAL
Status: DISCONTINUED | OUTPATIENT
Start: 2020-12-26 | End: 2020-12-29 | Stop reason: HOSPADM

## 2020-12-26 RX ORDER — DULOXETIN HYDROCHLORIDE 60 MG/1
60 CAPSULE, DELAYED RELEASE ORAL DAILY
Status: DISCONTINUED | OUTPATIENT
Start: 2020-12-26 | End: 2020-12-29 | Stop reason: HOSPADM

## 2020-12-26 RX ORDER — SPIRONOLACTONE 25 MG/1
25 TABLET ORAL DAILY
Status: DISCONTINUED | OUTPATIENT
Start: 2020-12-26 | End: 2020-12-27

## 2020-12-26 RX ORDER — SODIUM CHLORIDE 9 MG/ML
INJECTION, SOLUTION INTRAVENOUS CONTINUOUS
Status: DISCONTINUED | OUTPATIENT
Start: 2020-12-26 | End: 2020-12-27

## 2020-12-26 RX ORDER — METHYLPREDNISOLONE SODIUM SUCCINATE 40 MG/ML
40 INJECTION, POWDER, LYOPHILIZED, FOR SOLUTION INTRAMUSCULAR; INTRAVENOUS 2 TIMES DAILY
Status: DISCONTINUED | OUTPATIENT
Start: 2020-12-26 | End: 2020-12-29 | Stop reason: HOSPADM

## 2020-12-26 RX ORDER — ACETAMINOPHEN 325 MG/1
650 TABLET ORAL EVERY 6 HOURS PRN
Status: DISCONTINUED | OUTPATIENT
Start: 2020-12-26 | End: 2020-12-26 | Stop reason: SDUPTHER

## 2020-12-26 RX ORDER — TRAZODONE HYDROCHLORIDE 50 MG/1
200 TABLET ORAL NIGHTLY
Status: DISCONTINUED | OUTPATIENT
Start: 2020-12-26 | End: 2020-12-29 | Stop reason: HOSPADM

## 2020-12-26 RX ORDER — ACETAMINOPHEN 325 MG/1
650 TABLET ORAL ONCE
Status: DISCONTINUED | OUTPATIENT
Start: 2020-12-26 | End: 2020-12-26 | Stop reason: SDUPTHER

## 2020-12-26 RX ORDER — POLYETHYLENE GLYCOL 3350 17 G/17G
17 POWDER, FOR SOLUTION ORAL DAILY PRN
Status: DISCONTINUED | OUTPATIENT
Start: 2020-12-26 | End: 2020-12-26 | Stop reason: SDUPTHER

## 2020-12-26 RX ORDER — HYDROCODONE BITARTRATE AND ACETAMINOPHEN 10; 325 MG/1; MG/1
1 TABLET ORAL EVERY 6 HOURS PRN
Status: DISCONTINUED | OUTPATIENT
Start: 2020-12-26 | End: 2020-12-29 | Stop reason: HOSPADM

## 2020-12-26 RX ORDER — ACETAMINOPHEN 325 MG/1
650 TABLET ORAL EVERY 6 HOURS PRN
Status: DISCONTINUED | OUTPATIENT
Start: 2020-12-26 | End: 2020-12-29 | Stop reason: HOSPADM

## 2020-12-26 RX ORDER — PROMETHAZINE HYDROCHLORIDE 25 MG/1
12.5 TABLET ORAL EVERY 6 HOURS PRN
Status: DISCONTINUED | OUTPATIENT
Start: 2020-12-26 | End: 2020-12-29 | Stop reason: HOSPADM

## 2020-12-26 RX ORDER — ZINC SULFATE 50(220)MG
100 CAPSULE ORAL DAILY
Status: DISCONTINUED | OUTPATIENT
Start: 2020-12-26 | End: 2020-12-29 | Stop reason: HOSPADM

## 2020-12-26 RX ORDER — VITAMIN B COMPLEX
2000 TABLET ORAL DAILY
Status: DISCONTINUED | OUTPATIENT
Start: 2020-12-26 | End: 2020-12-29 | Stop reason: HOSPADM

## 2020-12-26 RX ORDER — POTASSIUM CHLORIDE 20 MEQ/1
20 TABLET, EXTENDED RELEASE ORAL 2 TIMES DAILY
Status: DISCONTINUED | OUTPATIENT
Start: 2020-12-26 | End: 2020-12-29 | Stop reason: HOSPADM

## 2020-12-26 RX ORDER — FUROSEMIDE 20 MG/1
20 TABLET ORAL DAILY
Status: DISCONTINUED | OUTPATIENT
Start: 2020-12-26 | End: 2020-12-29 | Stop reason: HOSPADM

## 2020-12-26 RX ORDER — METHYLPREDNISOLONE SODIUM SUCCINATE 40 MG/ML
40 INJECTION, POWDER, LYOPHILIZED, FOR SOLUTION INTRAMUSCULAR; INTRAVENOUS EVERY 12 HOURS
Status: DISCONTINUED | OUTPATIENT
Start: 2020-12-26 | End: 2020-12-26 | Stop reason: SDUPTHER

## 2020-12-26 RX ORDER — ASCORBIC ACID 500 MG
1000 TABLET ORAL 2 TIMES DAILY
Status: DISCONTINUED | OUTPATIENT
Start: 2020-12-26 | End: 2020-12-26 | Stop reason: SDUPTHER

## 2020-12-26 RX ORDER — ONDANSETRON 2 MG/ML
4 INJECTION INTRAMUSCULAR; INTRAVENOUS EVERY 6 HOURS PRN
Status: DISCONTINUED | OUTPATIENT
Start: 2020-12-26 | End: 2020-12-26 | Stop reason: SDUPTHER

## 2020-12-26 RX ORDER — ALBUTEROL SULFATE 90 UG/1
2 AEROSOL, METERED RESPIRATORY (INHALATION) 4 TIMES DAILY
Status: DISCONTINUED | OUTPATIENT
Start: 2020-12-26 | End: 2020-12-29 | Stop reason: HOSPADM

## 2020-12-26 RX ORDER — ALBUTEROL SULFATE 90 UG/1
AEROSOL, METERED RESPIRATORY (INHALATION)
Status: COMPLETED
Start: 2020-12-26 | End: 2020-12-27

## 2020-12-26 RX ORDER — ACETAMINOPHEN 650 MG/1
650 SUPPOSITORY RECTAL EVERY 6 HOURS PRN
Status: DISCONTINUED | OUTPATIENT
Start: 2020-12-26 | End: 2020-12-26 | Stop reason: SDUPTHER

## 2020-12-26 RX ORDER — ONDANSETRON 2 MG/ML
4 INJECTION INTRAMUSCULAR; INTRAVENOUS EVERY 6 HOURS PRN
Status: DISCONTINUED | OUTPATIENT
Start: 2020-12-26 | End: 2020-12-29 | Stop reason: HOSPADM

## 2020-12-26 RX ORDER — SODIUM CHLORIDE 0.9 % (FLUSH) 0.9 %
10 SYRINGE (ML) INJECTION PRN
Status: DISCONTINUED | OUTPATIENT
Start: 2020-12-26 | End: 2020-12-29 | Stop reason: HOSPADM

## 2020-12-26 RX ORDER — ZINC SULFATE 50(220)MG
100 CAPSULE ORAL DAILY
Status: DISCONTINUED | OUTPATIENT
Start: 2020-12-26 | End: 2020-12-26 | Stop reason: SDUPTHER

## 2020-12-26 RX ORDER — ASPIRIN 81 MG/1
81 TABLET ORAL DAILY
Status: DISCONTINUED | OUTPATIENT
Start: 2020-12-26 | End: 2020-12-29 | Stop reason: HOSPADM

## 2020-12-26 RX ORDER — LEVOTHYROXINE SODIUM 0.15 MG/1
150 TABLET ORAL DAILY
Status: DISCONTINUED | OUTPATIENT
Start: 2020-12-26 | End: 2020-12-29 | Stop reason: HOSPADM

## 2020-12-26 RX ORDER — PREGABALIN 75 MG/1
300 CAPSULE ORAL 2 TIMES DAILY
Status: DISCONTINUED | OUTPATIENT
Start: 2020-12-26 | End: 2020-12-29 | Stop reason: HOSPADM

## 2020-12-26 RX ORDER — POLYETHYLENE GLYCOL 3350 17 G/17G
17 POWDER, FOR SOLUTION ORAL DAILY PRN
Status: DISCONTINUED | OUTPATIENT
Start: 2020-12-26 | End: 2020-12-29 | Stop reason: HOSPADM

## 2020-12-26 RX ORDER — IVERMECTIN 3 MG/1
12 TABLET ORAL ONCE
Status: DISCONTINUED | OUTPATIENT
Start: 2020-12-26 | End: 2020-12-26 | Stop reason: SDUPTHER

## 2020-12-26 RX ORDER — ACETAMINOPHEN 650 MG/1
650 SUPPOSITORY RECTAL EVERY 6 HOURS PRN
Status: DISCONTINUED | OUTPATIENT
Start: 2020-12-26 | End: 2020-12-29 | Stop reason: HOSPADM

## 2020-12-26 RX ORDER — SODIUM CHLORIDE 0.9 % (FLUSH) 0.9 %
10 SYRINGE (ML) INJECTION PRN
Status: DISCONTINUED | OUTPATIENT
Start: 2020-12-26 | End: 2020-12-27

## 2020-12-26 RX ORDER — MIDODRINE HYDROCHLORIDE 5 MG/1
10 TABLET ORAL
Status: DISCONTINUED | OUTPATIENT
Start: 2020-12-26 | End: 2020-12-29 | Stop reason: HOSPADM

## 2020-12-26 RX ADMIN — RIVAROXABAN 20 MG: 20 TABLET, FILM COATED ORAL at 15:23

## 2020-12-26 RX ADMIN — POTASSIUM CHLORIDE 20 MEQ: 1500 TABLET, EXTENDED RELEASE ORAL at 20:43

## 2020-12-26 RX ADMIN — METHYLPREDNISOLONE SODIUM SUCCINATE 40 MG: 40 INJECTION, POWDER, FOR SOLUTION INTRAMUSCULAR; INTRAVENOUS at 20:42

## 2020-12-26 RX ADMIN — SPIRONOLACTONE 25 MG: 25 TABLET, FILM COATED ORAL at 15:24

## 2020-12-26 RX ADMIN — ALBUTEROL SULFATE 2 PUFF: 90 AEROSOL, METERED RESPIRATORY (INHALATION) at 23:17

## 2020-12-26 RX ADMIN — IVERMECTIN 12 MG: 3 TABLET ORAL at 15:30

## 2020-12-26 RX ADMIN — POTASSIUM CHLORIDE 20 MEQ: 1500 TABLET, EXTENDED RELEASE ORAL at 15:23

## 2020-12-26 RX ADMIN — METOPROLOL TARTRATE 25 MG: 25 TABLET, FILM COATED ORAL at 20:43

## 2020-12-26 RX ADMIN — TRAZODONE HYDROCHLORIDE 200 MG: 50 TABLET ORAL at 20:43

## 2020-12-26 RX ADMIN — ASPIRIN 81 MG: 81 TABLET, COATED ORAL at 15:23

## 2020-12-26 RX ADMIN — PREGABALIN 300 MG: 75 CAPSULE ORAL at 15:23

## 2020-12-26 RX ADMIN — PREGABALIN 300 MG: 75 CAPSULE ORAL at 20:42

## 2020-12-26 RX ADMIN — METOPROLOL TARTRATE 25 MG: 25 TABLET, FILM COATED ORAL at 15:23

## 2020-12-26 RX ADMIN — MIDODRINE HYDROCHLORIDE 10 MG: 5 TABLET ORAL at 16:37

## 2020-12-26 RX ADMIN — FUROSEMIDE 20 MG: 20 TABLET ORAL at 15:23

## 2020-12-26 RX ADMIN — DULOXETINE HYDROCHLORIDE 60 MG: 60 CAPSULE, DELAYED RELEASE ORAL at 15:23

## 2020-12-26 RX ADMIN — PANTOPRAZOLE SODIUM 40 MG: 40 TABLET, DELAYED RELEASE ORAL at 15:23

## 2020-12-26 RX ADMIN — SODIUM CHLORIDE: 9 INJECTION, SOLUTION INTRAVENOUS at 14:22

## 2020-12-26 RX ADMIN — REMDESIVIR 200 MG: 100 INJECTION, POWDER, LYOPHILIZED, FOR SOLUTION INTRAVENOUS at 13:05

## 2020-12-26 RX ADMIN — TAMSULOSIN HYDROCHLORIDE 0.4 MG: 0.4 CAPSULE ORAL at 15:24

## 2020-12-26 RX ADMIN — LEVOTHYROXINE SODIUM 150 MCG: 150 TABLET ORAL at 15:24

## 2020-12-26 RX ADMIN — OXYCODONE HYDROCHLORIDE AND ACETAMINOPHEN 1000 MG: 500 TABLET ORAL at 20:43

## 2020-12-26 ASSESSMENT — PAIN SCALES - GENERAL
PAINLEVEL_OUTOF10: 7
PAINLEVEL_OUTOF10: 7

## 2020-12-26 ASSESSMENT — ENCOUNTER SYMPTOMS
EYES NEGATIVE: 1
CHOKING: 0
CHEST TIGHTNESS: 1
WHEEZING: 0
STRIDOR: 0
SHORTNESS OF BREATH: 1
BACK PAIN: 0
GASTROINTESTINAL NEGATIVE: 1
COUGH: 1
APNEA: 0

## 2020-12-26 ASSESSMENT — PAIN DESCRIPTION - PAIN TYPE: TYPE: CHRONIC PAIN

## 2020-12-26 ASSESSMENT — PAIN DESCRIPTION - LOCATION
LOCATION: BACK
LOCATION: BACK

## 2020-12-26 ASSESSMENT — PAIN DESCRIPTION - DESCRIPTORS: DESCRIPTORS: ACHING

## 2020-12-26 NOTE — CONSULTS
Remdesivir Initiation & Daily Monitoring    Physician requesting remdesivir (use limited to ID): ER Dr Enid Kelly    Demonstrated benefit is to shorten the duration of illness, mortality benefit has not been shown. Seems to be most beneficial early in the disease course. Criteria for use (confirm all are met): no notes yet  Suspected/Confirmed COVID-19 positive and requiring hospitalization  Patient requires supplemental oxygen (this is the population for whom remdesivir demonstrated a shortened duration of illness; benefit less clear for those on high flow oxygen or mechanical ventilation, but may be reasonable to consider if change in respiratory status was recent)  Not recommended to be used in patients with baseline ALT 5x ULN or more    There are no PK data available for severe renal impairment (eGFR < 30 mL/min) or on RRT. Consider risk/benefit for individual patient. Baseline CrCl: Estimated Creatinine Clearance: 69 mL/min (based on SCr of 0.67 mg/dL). Ensure LFTs are ordered at baseline & consider if monitoring during therapy is appropriate (this information is included in CMP or can be ordered separately). Package insert states: Perform hepatic laboratory testing in all patients before starting VEKLURY and while receiving VEKLURY as clinically appropriate. Baseline ALT: 15    Duration of therapy should be limited to 5 days. Anticipated stop date: 12-30    Also assess corticosteroid therapy: If patient has had symptoms for 7 days or more and is on supplemental oxygen, then a course of dexamethasone can also be considered. Dexamethasone indicated? Need more info  If yes, is dexamethasone ordered?  Not yet  Consider baseline CRP (steroids may be beneficial if > 20 mg/L and may be harmful if < 10 mg/L):     Tawny Velazquez, PharmD 12/26/2020 12:10 PM

## 2020-12-26 NOTE — ED NOTES
Left message for Dr. Mendoza Pay to return call as hospitalist per Dr. Anderson Mon request.     Arnold Fields Reser  12/26/20 [id number]

## 2020-12-26 NOTE — ED PROVIDER NOTES
[facility]  EMERGENCY DEPARTMENT ENCOUNTER      Pt Name: Shy Cedeno  MRN: [id number]  [place] 1945  Date of evaluation: 12/26/2020  Provider: Bess Cartagena MD    CHIEF COMPLAINT     Chief Complaint   Patient presents with    Fatigue     This is AM    Shortness of Breath     This is AM    Chest Pain     This is AM    Cough     This is AM         HISTORY OF PRESENT ILLNESS   (Location/Symptom, Timing/Onset, Context/Setting,Quality, Duration, Modifying Factors, Severity)  Note limiting factors. Shy Cedeno is a77 y.o. female who presents to the emergency department      This is a 77years old female with a history of COPD coming to our ER complaining of shortness of breath. She has recently admitted for committee acquired pneumonia and upper respiratory distress and intubation with a stay in ICU on the vent. She recently came out of the rehab this month on December 15th according to her . There are tell me that she started having some shortness of breath yesterday possibly today before however she got a lot worse during the night tonight and progressed significantly this morning about 6 AM.  She is complaining of shortness of breath, coughing of productive yellowish sputum. Also having fever, fatigue, lack of appetite. History of COPD    Nursing Notes werereviewed. REVIEW OF SYSTEMS    (2-9 systems for level 4, 10 or more for level 5)     Review of Systems   Constitutional: Positive for appetite change, chills, diaphoresis, fatigue and fever. Negative for activity change and unexpected weight change. HENT: Negative. Eyes: Negative. Respiratory: Positive for cough, chest tightness and shortness of breath. Negative for apnea, choking, wheezing and stridor. Cardiovascular: Positive for chest pain. Gastrointestinal: Negative. Genitourinary: Negative. Musculoskeletal: Positive for myalgias.  Negative for arthralgias, back pain, gait problem, joint swelling, neck pain and neck stiffness. Neurological: Negative. Hematological: Negative. Except as noted above the remainder of the review of systems was reviewed and negative. PAST MEDICAL HISTORY     Past Medical History:   Diagnosis Date    COPD (chronic obstructive pulmonary disease) ([address]     Depression     GERD (gastroesophageal reflux disease)     Osteoporosis     Pneumonia          SURGICALHISTORY       Past Surgical History:   Procedure Laterality Date    BACK SURGERY      BREAST SURGERY      CARPAL TUNNEL RELEASE      FRACTURE SURGERY Left 12/20/2018    ORIF wrist    HYSTERECTOMY      JOINT REPLACEMENT      right knee    JOINT REPLACEMENT      WRIST FRACTURE SURGERY Left 12/20/2018    WRIST OPEN REDUCTION INTERNAL FIXATION-DISTAL RADIUS performed by Loly Pires MD at [place] [address]       Previous Medications    ALBUTEROL SULFATE  (90 BASE) MCG/ACT INHALER    Inhale 2 puffs into the lungs 4 times daily    ALBUTEROL-IPRATROPIUM (COMBIVENT RESPIMAT)  MCG/ACT AERS INHALER    Inhale 1 puff into the lungs every 6 hours    ALENDRONATE (FOSAMAX) 70 MG TABLET    Take 70 mg by mouth every 7 days    ASPIRIN 81 MG TABLET    Take 81 mg by mouth daily    CALCIUM CITRATE-VITAMIN D (CITRICAL + D) 315-250 MG-UNIT TABS PER TABLET    Take 1 tablet by mouth 2 times daily (with meals)    DULOXETINE (CYMBALTA) 60 MG EXTENDED RELEASE CAPSULE    Take 60 mg by mouth daily    FUROSEMIDE (LASIX) 20 MG TABLET    Take 1 tablet by mouth daily    HYDROCODONE-ACETAMINOPHEN (NORCO)  MG PER TABLET    Take 1 tablet by mouth every 6 hours as needed for Pain.     HYDROXYCHLOROQUINE SULFATE (PLAQUENIL PO)    Take by mouth    LEVOTHYROXINE (SYNTHROID) 150 MCG TABLET    Take 150 mcg by mouth Daily    METOPROLOL TARTRATE (LOPRESSOR) 25 MG TABLET    Take 1 tablet by mouth 2 times daily    MIDODRINE (PROAMATINE) 10 MG TABLET    Take 1 tablet by mouth 3 times daily (with meals)    PANTOPRAZOLE SODIUM (PROTONIX) 40 MG PACK PACKET    Take 40 mg by mouth 2 times daily (before meals)    POTASSIUM CHLORIDE (KLOR-CON) 20 MEQ PACKET    Take 20 mEq by mouth 2 times daily    PREGABALIN (LYRICA) 300 MG CAPSULE    Take 1 capsule by mouth 2 times daily for 30 days. RIVAROXABAN (XARELTO) 20 MG TABS TABLET    Take 1 tablet by mouth daily (with breakfast)    SPIRONOLACTONE (ALDACTONE) 25 MG TABLET    Take 1 tablet by mouth daily    TAMSULOSIN (FLOMAX) 0.4 MG CAPSULE    Take 1 capsule by mouth daily    TRAZODONE (DESYREL) 100 MG TABLET    Take 200 mg by mouth nightly             Patient has no known allergies.     FAMILY HISTORY       Family History   Problem Relation Age of Onset    Heart Attack Father 61    Heart Attack Sister     Heart Disease Brother           SOCIAL HISTORY       Social History     Socioeconomic History    Marital status:      Spouse name: None    Number of children: None    Years of education: None    Highest education level: None   Occupational History    None   Social Needs    Financial resource strain: None    Food insecurity     Worry: None     Inability: None    Transportation needs     Medical: None     Non-medical: None   Tobacco Use    Smoking status: Former Smoker     Packs/day: 1.00     Years: 10.00     Pack years: 10.00     Quit date: 1976     Years since quittin.1    Smokeless tobacco: Never Used   Substance and Sexual Activity    Alcohol use: No    Drug use: No    Sexual activity: None   Lifestyle    Physical activity     Days per week: None     Minutes per session: None    Stress: None   Relationships    Social connections     Talks on phone: None     Gets together: None     Attends Restoration service: None     Active member of club or organization: None     Attends meetings of clubs or organizations: None     Relationship status: None    Intimate partner violence     Fear of current or ex partner: None     Emotionally abused: None     Physically abused: None     Forced sexual activity: None   Other Topics Concern    None   Social History Narrative    None       SCREENINGS                    PHYSICAL EXAM    (up to 7 for level 4, 8 or more for level 5)     ED Triage Vitals [12/26/20 0955]   BP Temp Temp Source Pulse Resp SpO2 Height Weight   106/83 100.5 °F (38.1 °C) Oral 99 -- -- 5' 4\" (1.626 m) 147 lb (66.7 kg)       Physical Exam  Constitutional:       General: She is in acute distress. Appearance: She is well-developed. She is ill-appearing. She is not toxic-appearing or diaphoretic. Interventions: She is not intubated. HENT:      Head: Normocephalic and atraumatic. Mouth/Throat:      Mouth: Mucous membranes are moist.   Cardiovascular:      Rate and Rhythm: Tachycardia present. No extrasystoles are present. Pulses: No decreased pulses. Heart sounds: No murmur. No friction rub. No gallop. Pulmonary:      Effort: Tachypnea and respiratory distress present. No bradypnea or accessory muscle usage. She is not intubated. Breath sounds: No stridor. Examination of the right-upper field reveals decreased breath sounds. Examination of the left-upper field reveals decreased breath sounds. Examination of the right-middle field reveals decreased breath sounds. Examination of the left-middle field reveals decreased breath sounds. Examination of the right-lower field reveals decreased breath sounds. Examination of the left-lower field reveals decreased breath sounds. Decreased breath sounds present. No wheezing, rhonchi or rales. Comments: Moderately distressed with decreased breath sounds throughout. Chest:      Chest wall: No mass, deformity, tenderness, crepitus or edema. There is no dullness to percussion. Abdominal:      Palpations: Abdomen is soft. Musculoskeletal: Normal range of motion. Skin:     General: Skin is warm. Capillary Refill: Capillary refill takes less than 2 seconds. were made to edit the dictations but occasionally words are mis-transcribed.)      Florian Andrade MD (electronically signed)  Attending Emergency Physician            Florian Andrade MD  12/26/20 0565

## 2020-12-27 LAB
ABSOLUTE EOS #: 0 K/UL (ref 0–0.44)
ABSOLUTE IMMATURE GRANULOCYTE: 0 K/UL (ref 0–0.3)
ABSOLUTE LYMPH #: 0.35 K/UL (ref 1.1–3.7)
ABSOLUTE MONO #: 0.17 K/UL (ref 0.1–1.2)
ALBUMIN SERPL-MCNC: 3.1 G/DL (ref 3.5–5.2)
ALBUMIN/GLOBULIN RATIO: 0.9 (ref 1–2.5)
ALP BLD-CCNC: 50 U/L (ref 35–104)
ALT SERPL-CCNC: 14 U/L (ref 5–33)
ANION GAP SERPL CALCULATED.3IONS-SCNC: 9 MMOL/L (ref 9–17)
AST SERPL-CCNC: 23 U/L
BASOPHILS # BLD: 0 % (ref 0–2)
BASOPHILS ABSOLUTE: 0 K/UL (ref 0–0.2)
BILIRUB SERPL-MCNC: 0.23 MG/DL (ref 0.3–1.2)
BUN BLDV-MCNC: 11 MG/DL (ref 8–23)
BUN/CREAT BLD: 19 (ref 9–20)
CALCIUM SERPL-MCNC: 8.9 MG/DL (ref 8.6–10.4)
CHLORIDE BLD-SCNC: 100 MMOL/L (ref 98–107)
CO2: 28 MMOL/L (ref 20–31)
CREAT SERPL-MCNC: 0.59 MG/DL (ref 0.5–0.9)
D-DIMER QUANTITATIVE: 1.79 MG/L FEU (ref 0–0.59)
DIFFERENTIAL TYPE: ABNORMAL
EOSINOPHILS RELATIVE PERCENT: 0 % (ref 1–4)
FIBRINOGEN: 371 MG/DL (ref 179–518)
GFR AFRICAN AMERICAN: >60 ML/MIN
GFR NON-AFRICAN AMERICAN: >60 ML/MIN
GFR SERPL CREATININE-BSD FRML MDRD: ABNORMAL ML/MIN/{1.73_M2}
GFR SERPL CREATININE-BSD FRML MDRD: ABNORMAL ML/MIN/{1.73_M2}
GLUCOSE BLD-MCNC: 131 MG/DL (ref 70–99)
HCT VFR BLD CALC: 26.6 % (ref 36.3–47.1)
HEMOGLOBIN: 7.9 G/DL (ref 11.9–15.1)
IMMATURE GRANULOCYTES: 0 %
INR BLD: 1.6
LYMPHOCYTES # BLD: 4 % (ref 24–43)
MCH RBC QN AUTO: 27.2 PG (ref 25.2–33.5)
MCHC RBC AUTO-ENTMCNC: 29.7 G/DL (ref 28.4–34.8)
MCV RBC AUTO: 91.7 FL (ref 82.6–102.9)
MONOCYTES # BLD: 2 % (ref 3–12)
MORPHOLOGY: NORMAL
NRBC AUTOMATED: 0 PER 100 WBC
PARTIAL THROMBOPLASTIN TIME: 46.9 SEC (ref 23.9–33.8)
PDW BLD-RTO: 14.7 % (ref 11.8–14.4)
PLATELET # BLD: 208 K/UL (ref 138–453)
PLATELET ESTIMATE: ABNORMAL
PMV BLD AUTO: 10.8 FL (ref 8.1–13.5)
POTASSIUM SERPL-SCNC: 4.1 MMOL/L (ref 3.7–5.3)
PROTHROMBIN TIME: 18.7 SEC (ref 11.5–14.2)
RBC # BLD: 2.9 M/UL (ref 3.95–5.11)
RBC # BLD: ABNORMAL 10*6/UL
SEG NEUTROPHILS: 94 % (ref 36–65)
SEGMENTED NEUTROPHILS ABSOLUTE COUNT: 8.18 K/UL (ref 1.5–8.1)
SODIUM BLD-SCNC: 137 MMOL/L (ref 135–144)
TOTAL PROTEIN: 6.5 G/DL (ref 6.4–8.3)
WBC # BLD: 8.7 K/UL (ref 3.5–11.3)
WBC # BLD: ABNORMAL 10*3/UL

## 2020-12-27 PROCEDURE — 6360000002 HC RX W HCPCS: Performed by: INTERNAL MEDICINE

## 2020-12-27 PROCEDURE — 94669 MECHANICAL CHEST WALL OSCILL: CPT

## 2020-12-27 PROCEDURE — 94640 AIRWAY INHALATION TREATMENT: CPT

## 2020-12-27 PROCEDURE — 6370000000 HC RX 637 (ALT 250 FOR IP): Performed by: INTERNAL MEDICINE

## 2020-12-27 PROCEDURE — 80053 COMPREHEN METABOLIC PANEL: CPT

## 2020-12-27 PROCEDURE — 85025 COMPLETE CBC W/AUTO DIFF WBC: CPT

## 2020-12-27 PROCEDURE — 6370000000 HC RX 637 (ALT 250 FOR IP)

## 2020-12-27 PROCEDURE — 85730 THROMBOPLASTIN TIME PARTIAL: CPT

## 2020-12-27 PROCEDURE — 36415 COLL VENOUS BLD VENIPUNCTURE: CPT

## 2020-12-27 PROCEDURE — 2580000003 HC RX 258: Performed by: INTERNAL MEDICINE

## 2020-12-27 PROCEDURE — 85610 PROTHROMBIN TIME: CPT

## 2020-12-27 PROCEDURE — 2580000003 HC RX 258: Performed by: FAMILY MEDICINE

## 2020-12-27 PROCEDURE — 1200000000 HC SEMI PRIVATE

## 2020-12-27 PROCEDURE — 85384 FIBRINOGEN ACTIVITY: CPT

## 2020-12-27 PROCEDURE — 94761 N-INVAS EAR/PLS OXIMETRY MLT: CPT

## 2020-12-27 PROCEDURE — 2700000000 HC OXYGEN THERAPY PER DAY

## 2020-12-27 PROCEDURE — 2500000003 HC RX 250 WO HCPCS: Performed by: FAMILY MEDICINE

## 2020-12-27 PROCEDURE — 85379 FIBRIN DEGRADATION QUANT: CPT

## 2020-12-27 RX ADMIN — Medication 2000 UNITS: at 08:28

## 2020-12-27 RX ADMIN — PREGABALIN 300 MG: 75 CAPSULE ORAL at 20:59

## 2020-12-27 RX ADMIN — METHYLPREDNISOLONE SODIUM SUCCINATE 40 MG: 40 INJECTION, POWDER, FOR SOLUTION INTRAMUSCULAR; INTRAVENOUS at 20:59

## 2020-12-27 RX ADMIN — ALBUTEROL SULFATE 2 PUFF: 90 AEROSOL, METERED RESPIRATORY (INHALATION) at 11:55

## 2020-12-27 RX ADMIN — TRAZODONE HYDROCHLORIDE 200 MG: 50 TABLET ORAL at 20:58

## 2020-12-27 RX ADMIN — PANTOPRAZOLE SODIUM 40 MG: 40 TABLET, DELAYED RELEASE ORAL at 16:25

## 2020-12-27 RX ADMIN — ALBUTEROL SULFATE 2 PUFF: 90 AEROSOL, METERED RESPIRATORY (INHALATION) at 20:15

## 2020-12-27 RX ADMIN — SPIRONOLACTONE 25 MG: 25 TABLET, FILM COATED ORAL at 08:27

## 2020-12-27 RX ADMIN — REMDESIVIR 100 MG: 100 INJECTION, POWDER, LYOPHILIZED, FOR SOLUTION INTRAVENOUS at 13:39

## 2020-12-27 RX ADMIN — PREGABALIN 300 MG: 75 CAPSULE ORAL at 08:27

## 2020-12-27 RX ADMIN — METOPROLOL TARTRATE 25 MG: 25 TABLET, FILM COATED ORAL at 20:58

## 2020-12-27 RX ADMIN — DULOXETINE HYDROCHLORIDE 60 MG: 60 CAPSULE, DELAYED RELEASE ORAL at 08:28

## 2020-12-27 RX ADMIN — ALBUTEROL SULFATE 2 PUFF: 90 AEROSOL, METERED RESPIRATORY (INHALATION) at 05:04

## 2020-12-27 RX ADMIN — TAMSULOSIN HYDROCHLORIDE 0.4 MG: 0.4 CAPSULE ORAL at 08:28

## 2020-12-27 RX ADMIN — OXYCODONE HYDROCHLORIDE AND ACETAMINOPHEN 1000 MG: 500 TABLET ORAL at 20:56

## 2020-12-27 RX ADMIN — MIDODRINE HYDROCHLORIDE 10 MG: 5 TABLET ORAL at 11:37

## 2020-12-27 RX ADMIN — METOPROLOL TARTRATE 25 MG: 25 TABLET, FILM COATED ORAL at 08:27

## 2020-12-27 RX ADMIN — ZINC SULFATE 220 MG (50 MG) CAPSULE 100 MG: CAPSULE at 08:28

## 2020-12-27 RX ADMIN — MIDODRINE HYDROCHLORIDE 10 MG: 5 TABLET ORAL at 16:25

## 2020-12-27 RX ADMIN — PANTOPRAZOLE SODIUM 40 MG: 40 TABLET, DELAYED RELEASE ORAL at 07:51

## 2020-12-27 RX ADMIN — METHYLPREDNISOLONE SODIUM SUCCINATE 40 MG: 40 INJECTION, POWDER, FOR SOLUTION INTRAMUSCULAR; INTRAVENOUS at 08:28

## 2020-12-27 RX ADMIN — FUROSEMIDE 20 MG: 20 TABLET ORAL at 08:27

## 2020-12-27 RX ADMIN — OXYCODONE HYDROCHLORIDE AND ACETAMINOPHEN 1000 MG: 500 TABLET ORAL at 08:27

## 2020-12-27 RX ADMIN — POTASSIUM CHLORIDE 20 MEQ: 1500 TABLET, EXTENDED RELEASE ORAL at 08:27

## 2020-12-27 RX ADMIN — LEVOTHYROXINE SODIUM 150 MCG: 150 TABLET ORAL at 07:51

## 2020-12-27 RX ADMIN — POTASSIUM CHLORIDE 20 MEQ: 1500 TABLET, EXTENDED RELEASE ORAL at 21:11

## 2020-12-27 RX ADMIN — ASPIRIN 81 MG: 81 TABLET, COATED ORAL at 08:27

## 2020-12-27 RX ADMIN — Medication 10 ML: at 21:12

## 2020-12-27 RX ADMIN — MIDODRINE HYDROCHLORIDE 10 MG: 5 TABLET ORAL at 07:51

## 2020-12-27 RX ADMIN — RIVAROXABAN 20 MG: 20 TABLET, FILM COATED ORAL at 07:51

## 2020-12-27 ASSESSMENT — PAIN DESCRIPTION - PAIN TYPE: TYPE: CHRONIC PAIN

## 2020-12-27 ASSESSMENT — PAIN SCALES - GENERAL
PAINLEVEL_OUTOF10: 5
PAINLEVEL_OUTOF10: 0

## 2020-12-27 ASSESSMENT — PAIN DESCRIPTION - PROGRESSION: CLINICAL_PROGRESSION: NOT CHANGED

## 2020-12-27 ASSESSMENT — PAIN DESCRIPTION - DESCRIPTORS: DESCRIPTORS: ACHING

## 2020-12-27 ASSESSMENT — PAIN DESCRIPTION - FREQUENCY: FREQUENCY: CONTINUOUS

## 2020-12-27 ASSESSMENT — PAIN DESCRIPTION - ORIENTATION: ORIENTATION: LOWER

## 2020-12-27 ASSESSMENT — PAIN DESCRIPTION - LOCATION: LOCATION: BACK

## 2020-12-27 ASSESSMENT — PAIN DESCRIPTION - ONSET: ONSET: ON-GOING

## 2020-12-27 NOTE — PROGRESS NOTES
Up to [facility]. Respirations labored with exertion. Returned to bed.   Lungs diminished throughout and oxygen continued at 4 liters per NC.

## 2020-12-27 NOTE — PLAN OF CARE
Problem: Airway Clearance - Ineffective  Goal: Achieve or maintain patent airway  Outcome: Ongoing     Problem: Gas Exchange - Impaired  Goal: Absence of hypoxia  Outcome: Ongoing  Goal: Promote optimal lung function  Outcome: Ongoing     Problem: Breathing Pattern - Ineffective  Goal: Ability to achieve and maintain a regular respiratory rate  Outcome: Ongoing     Problem: Body Temperature -  Risk of, Imbalanced  Goal: Ability to maintain a body temperature within defined limits  Outcome: Completed  Goal: Will regain or maintain usual level of consciousness  Outcome: Ongoing  Goal: Complications related to the disease process, condition or treatment will be avoided or minimized  Outcome: Ongoing     Problem: Isolation Precautions - Risk of Spread of Infection  Goal: Prevent transmission of infection  Outcome: Ongoing     Problem: Nutrition Deficits  Goal: Optimize nutrtional status  Outcome: Ongoing     Problem: Risk for Fluid Volume Deficit  Goal: Maintain normal heart rhythm  Outcome: Ongoing  Goal: Maintain absence of muscle cramping  Outcome: Completed  Goal: Maintain normal serum potassium, sodium, calcium, phosphorus, and pH  Outcome: Ongoing     Problem: Loneliness or Risk for Loneliness  Goal: Demonstrate positive use of time alone when socialization is not possible  Outcome: Ongoing     Problem: Fatigue  Goal: Verbalize increase energy and improved vitality  Outcome: Ongoing     Problem: Patient Education: Go to Patient Education Activity  Goal: Patient/Family Education  Outcome: Ongoing     Problem: Falls - Risk of:  Goal: Will remain free from falls  Description: Will remain free from falls  Outcome: Ongoing  Goal: Absence of physical injury  Description: Absence of physical injury  Outcome: Ongoing     Problem: Pain:  Description: Pain management should include both nonpharmacologic and pharmacologic interventions.   Goal: Pain level will decrease  Description: Pain level will decrease  Outcome: Ongoing  Goal: Control of acute pain  Description: Control of acute pain  Outcome: Ongoing  Goal: Control of chronic pain  Description: Control of chronic pain  Outcome: Ongoing

## 2020-12-27 NOTE — H&P
Patient:  Rach Curtis  MRN: [id number]    Chief Complaint:    Chief Complaint   Patient presents with    Fatigue     This is AM    Shortness of Breath     This is AM    Chest Pain     This is AM    Cough     This is AM       History Obtained From:  patient, electronic medical record    PCP: Alejandra Castano MD    History of Present Illness: The patient is a 77 y.o. female who presents with COPD coming to our ER complaining of shortness of breath. She has recently admitted for committee acquired pneumonia and upper respiratory distress and intubation with a stay in ICU on the vent. She recently came out of the rehab this month on December 15th according to her . There are tell me that she started having some shortness of breath yesterday possibly today before however she got a lot worse during the night tonight and progressed significantly this morning about 6 AM.  She is complaining of shortness of breath, coughing of productive yellowish sputum. Also having fever, fatigue, lack of appetite.        History of COPD     Nursing Notes werereviewed.     REVIEW OF SYSTEMS    (2-9 systems for level 4, 10 or more for level 5)      Review of Systems   Constitutional: Positive for appetite change, chills, diaphoresis, fatigue and fever. Negative for activity change and unexpected weight change. HENT: Negative. Eyes: Negative. Respiratory: Positive for cough, chest tightness and shortness of breath. Negative for apnea, choking, wheezing and stridor. Cardiovascular: Positive for chest pain. Gastrointestinal: Negative. Genitourinary: Negative. Musculoskeletal: Positive for myalgias. Negative for arthralgias, back pain, gait problem, joint swelling, neck pain and neck stiffness. Neurological: Negative.     Hematological: Negative.           Past Medical History:        Diagnosis Date    COPD (chronic obstructive pulmonary disease) (HCC)     Depression     GERD (gastroesophageal reflux disease)     Osteoporosis     Pneumonia        Past Surgical History:        Procedure Laterality Date    BACK SURGERY      BREAST SURGERY      CARPAL TUNNEL RELEASE      FRACTURE SURGERY Left 12/20/2018    ORIF wrist    HYSTERECTOMY      JOINT REPLACEMENT      right knee    JOINT REPLACEMENT      WRIST FRACTURE SURGERY Left 12/20/2018    WRIST OPEN REDUCTION INTERNAL FIXATION-DISTAL RADIUS performed by Rebecca Joiner MD at [place] [address] History:       Problem Relation Age of Onset    Heart Attack Father 61    Heart Attack Sister     Heart Disease Brother        Social History:   TOBACCO:   reports that she quit smoking about 44 years ago. She has a 10.00 pack-year smoking history. She has never used smokeless tobacco.  ETOH:   reports no history of alcohol use. Allergies:  Patient has no known allergies. Medications Prior to Admission:    Prior to Admission medications    Medication Sig Start Date End Date Taking? Authorizing Provider   HYDROcodone-acetaminophen (NORCO)  MG per tablet Take 1 tablet by mouth every 6 hours as needed for Pain.    Yes Historical Provider, MD   furosemide (LASIX) 20 MG tablet Take 1 tablet by mouth daily 12/7/20  Yes David Stubbs MD   metoprolol tartrate (LOPRESSOR) 25 MG tablet Take 1 tablet by mouth 2 times daily 11/5/20  Yes En Snowden MD   midodrine (PROAMATINE) 10 MG tablet Take 1 tablet by mouth 3 times daily (with meals) 11/5/20  Yes En Snowden MD   spironolactone (ALDACTONE) 25 MG tablet Take 1 tablet by mouth daily 11/6/20  Yes En Snowden MD   tamsulosin (FLOMAX) 0.4 MG capsule Take 1 capsule by mouth daily 11/6/20  Yes En Snowden MD   albuterol-ipratropium (COMBIVENT RESPIMAT)  MCG/ACT AERS inhaler Inhale 1 puff into the lungs every 6 hours 11/5/20  Yes En Snowden MD   rivaroxaban (XARELTO) 20 MG TABS tablet Take 1 tablet by mouth daily (with breakfast) 11/5/20  Yes En Snowden MD   albuterol sulfate  (90 Base) MCG/ACT inhaler Inhale 2 puffs into the lungs 4 times daily 10/8/18  Yes Ba Santos PA-C   levothyroxine (SYNTHROID) 150 MCG tablet Take 150 mcg by mouth Daily   Yes Historical Provider, MD   aspirin 81 MG tablet Take 81 mg by mouth daily   Yes Historical Provider, MD   DULoxetine (CYMBALTA) 60 MG extended release capsule Take 60 mg by mouth daily   Yes Historical Provider, MD   traZODone (DESYREL) 100 MG tablet Take 200 mg by mouth nightly    Yes Historical Provider, MD   pantoprazole sodium (PROTONIX) 40 MG PACK packet Take 40 mg by mouth 2 times daily (before meals)   Yes Historical Provider, MD   potassium chloride (KLOR-CON) 20 MEQ packet Take 20 mEq by mouth 2 times daily   Yes Historical Provider, MD   calcium citrate-vitamin D (CITRICAL + D) 315-250 MG-UNIT TABS per tablet Take 1 tablet by mouth 2 times daily (with meals)   Yes Historical Provider, MD   pregabalin (LYRICA) 300 MG capsule Take 1 capsule by mouth 2 times daily for 30 days. 11/6/20 12/18/20  Danisha Harrison MD   Hydroxychloroquine Sulfate (PLAQUENIL PO) Take by mouth    Historical Provider, MD   alendronate (FOSAMAX) 70 MG tablet Take 70 mg by mouth every 7 days    Historical Provider, MD       Review of Systems:      Physical Exam:    Vitals:   Temp: 97.3 °F (36.3 °C)  BP: (!) 103/34  Resp: 15  Pulse: 77  SpO2: 93 %  24HR INTAKE/OUTPUT:      Intake/Output Summary (Last 24 hours) at 12/27/2020 0926  Last data filed at 12/27/2020 2417  Gross per 24 hour   Intake 1640 ml   Output --   Net 1640 ml       Exam:  GEN:  alert and oriented to person, place and time  EYES: No gross abnormalities.  and Sclera nonicteric  NECK: supple,  no carotid bruits  PULM: decreased breath sounds noted- b  COR: regular rate & rhythm  ABD:  soft, non-tender and non-distended  EXT:   no cyanosis, clubbing or edema present    NEURO: follows commands, KURTZ, no deficits  SKIN:  no rashes or significant lesions  -----------------------------------------------------------------  Diagnostic Data:   Lab Results   Component Value Date    WBC 8.7 12/27/2020    HGB 7.9 (L) 12/27/2020     12/27/2020       Lab Results   Component Value Date    BUN 11 12/27/2020    CREATININE 0.59 12/27/2020     12/27/2020    K 4.1 12/27/2020    CALCIUM 8.9 12/27/2020     12/27/2020    CO2 28 12/27/2020    LABGLOM >60 12/27/2020       Lab Results   Component Value Date    WBCUA 0 TO 2 10/30/2020    RBCUA 20 TO 50 10/30/2020    EPITHUA 0 TO 2 10/30/2020    LEUKOCYTESUR NEGATIVE 10/30/2020    SPECGRAV 1.013 10/30/2020    GLUCOSEU NEGATIVE 10/30/2020    KETUA NEGATIVE 10/30/2020    PROTEINU NEGATIVE 10/30/2020    HGBUR MODERATE (A) 10/30/2020    CASTUA  10/30/2020     0 TO 2 HYALINE Reference range defined for non-centrifuged specimen. CRYSTUA NOT REPORTED 10/30/2020    BACTERIA NOT REPORTED 10/30/2020    YEAST NOT REPORTED 10/30/2020       Lab Results   Component Value Date    TROPONINT NOT REPORTED 12/26/2020    PROBNP 304 (H) 12/07/2020       Xr Chest (single View Frontal)    Result Date: 12/26/2020  EXAMINATION: ONE XRAY VIEW OF THE CHEST 12/26/2020 10:16 am COMPARISON: Multiple prior exams most recently 12/07/2020 HISTORY: ORDERING SYSTEM PROVIDED HISTORY: sob FINDINGS: Interval development of extensive bilateral perihilar opacification. Additional right greater than left bilateral opacities also present similar to prior examination. No effusion or pneumothorax. Cardiomediastinal silhouette is stable. Hiatal hernia is noted. Stimulator leads and partially visualized left shoulder arthroplasty. *Interval development of extensive bilateral perihilar opacification which correlate to pulmonary edema, multifocal pneumonia and/or other infectious inflammatory process. *Additional right greater than left bilateral opacities also present similar to prior examination which may be on chronic basis.          Assessment:

## 2020-12-27 NOTE — ACP (ADVANCE CARE PLANNING)
Advance Care Planning     Advance Care Planning Activator (Inpatient)  Conversation Note      Date of ACP Conversation: 12/26/2020    Conversation Conducted with: Patient with Decision Making Capacity    ACP Activator: Jer Tse Decision Maker:     Current Designated Health Care Decision Maker:   Primary Decision Maker: Sherrine Koyanagi Spouse - [phone]    Secondary Decision Maker: Annemarie Adams - Child - [phone]    Supplemental (Other) Decision Maker: Leonor Hogan - Child - [phone]    Validate  this information as still accurate & up-to-date 12/27/2020    Care Preferences    Ventilation: \"If you were in your present state of health and suddenly became very ill and were unable to breathe on your own, what would your preference be about the use of a ventilator (breathing machine) if it were available to you? \"      Would the patient desire the use of ventilator (breathing machine)?: yes    \"If your health worsens and it becomes clear that your chance of recovery is unlikely, what would your preference be about the use of a ventilator (breathing machine) if it were available to you? \"     Would the patient desire the use of ventilator (breathing machine)?: Yes      Resuscitation  \"CPR works best to restart the heart when there is a sudden event, like a heart attack, in someone who is otherwise healthy. Unfortunately, CPR does not typically restart the heart for people who have serious health conditions or who are very sick. \"    \"In the event your heart stopped as a result of an underlying serious health condition, would you want attempts to be made to restart your heart (answer \"yes\" for attempt to resuscitate) or would you prefer a natural death (answer \"no\" for do not attempt to resuscitate)? \" yes      NOTE: If the patient has a valid advance directive AND now provides care preference(s) that are inconsistent with that prior directive, advise the patient to consider either: creating a new advance directive that complies with state-specific requirements; or, if that is not possible, orally revoking that prior directive in accordance with state-specific requirements, which must be documented in the EHR. [x] Yes   [] No   Educated Patient / Miladys Helms regarding differences between Advance Directives and portable DNR orders.     Length of ACP Conversation in minutes:      Conversation Outcomes:  [x] ACP discussion completed  [] Existing advance directive reviewed with patient; no changes to patient's previously recorded wishes  [] New Advance Directive completed  [] Portable Do Not Rescitate prepared for Provider review and signature  [] POLST/POST/MOLST/MOST prepared for Provider review and signature      Follow-up plan:    [] Schedule follow-up conversation to continue planning  [x] Referred individual to Provider for additional questions/concerns   [] Advised patient/agent/surrogate to review completed ACP document and update if needed with changes in condition, patient preferences or care setting    [x] This note routed to one or more involved healthcare providers

## 2020-12-28 LAB
ABSOLUTE EOS #: 0.15 K/UL (ref 0–0.44)
ABSOLUTE IMMATURE GRANULOCYTE: 0 K/UL (ref 0–0.3)
ABSOLUTE LYMPH #: 0.39 K/UL (ref 1.1–3.7)
ABSOLUTE MONO #: 0 K/UL (ref 0.1–1.2)
ALBUMIN SERPL-MCNC: 2.8 G/DL (ref 3.5–5.2)
ALBUMIN/GLOBULIN RATIO: 0.8 (ref 1–2.5)
ALP BLD-CCNC: 44 U/L (ref 35–104)
ALT SERPL-CCNC: 11 U/L (ref 5–33)
ANION GAP SERPL CALCULATED.3IONS-SCNC: 9 MMOL/L (ref 9–17)
AST SERPL-CCNC: 17 U/L
BASOPHILS # BLD: 0 % (ref 0–2)
BASOPHILS ABSOLUTE: 0 K/UL (ref 0–0.2)
BILIRUB SERPL-MCNC: 0.15 MG/DL (ref 0.3–1.2)
BUN BLDV-MCNC: 14 MG/DL (ref 8–23)
BUN/CREAT BLD: 31 (ref 9–20)
CALCIUM SERPL-MCNC: 9 MG/DL (ref 8.6–10.4)
CHLORIDE BLD-SCNC: 107 MMOL/L (ref 98–107)
CO2: 28 MMOL/L (ref 20–31)
CREAT SERPL-MCNC: 0.45 MG/DL (ref 0.5–0.9)
D-DIMER QUANTITATIVE: 1.14 MG/L FEU (ref 0–0.59)
DIFFERENTIAL TYPE: ABNORMAL
EOSINOPHILS RELATIVE PERCENT: 2 % (ref 1–4)
FIBRINOGEN: 371 MG/DL (ref 179–518)
GFR AFRICAN AMERICAN: >60 ML/MIN
GFR NON-AFRICAN AMERICAN: >60 ML/MIN
GFR SERPL CREATININE-BSD FRML MDRD: ABNORMAL ML/MIN/{1.73_M2}
GFR SERPL CREATININE-BSD FRML MDRD: ABNORMAL ML/MIN/{1.73_M2}
GLUCOSE BLD-MCNC: 139 MG/DL (ref 70–99)
HCT VFR BLD CALC: 24.6 % (ref 36.3–47.1)
HCT VFR BLD CALC: 28 % (ref 36.3–47.1)
HEMOGLOBIN: 7.3 G/DL (ref 11.9–15.1)
HEMOGLOBIN: 8.5 G/DL (ref 11.9–15.1)
IMMATURE GRANULOCYTES: 0 %
INR BLD: 1.4
LYMPHOCYTES # BLD: 5 % (ref 24–43)
MCH RBC QN AUTO: 27.4 PG (ref 25.2–33.5)
MCHC RBC AUTO-ENTMCNC: 29.7 G/DL (ref 28.4–34.8)
MCV RBC AUTO: 92.5 FL (ref 82.6–102.9)
MONOCYTES # BLD: 0 % (ref 3–12)
MORPHOLOGY: ABNORMAL
NRBC AUTOMATED: 0 PER 100 WBC
PARTIAL THROMBOPLASTIN TIME: 43.1 SEC (ref 23.9–33.8)
PDW BLD-RTO: 14.8 % (ref 11.8–14.4)
PLATELET # BLD: 232 K/UL (ref 138–453)
PLATELET ESTIMATE: ABNORMAL
PMV BLD AUTO: 10.9 FL (ref 8.1–13.5)
POTASSIUM SERPL-SCNC: 4.4 MMOL/L (ref 3.7–5.3)
PROTHROMBIN TIME: 17.4 SEC (ref 11.5–14.2)
RBC # BLD: 2.66 M/UL (ref 3.95–5.11)
RBC # BLD: ABNORMAL 10*6/UL
SEG NEUTROPHILS: 93 % (ref 36–65)
SEGMENTED NEUTROPHILS ABSOLUTE COUNT: 7.16 K/UL (ref 1.5–8.1)
SODIUM BLD-SCNC: 144 MMOL/L (ref 135–144)
TOTAL PROTEIN: 6.1 G/DL (ref 6.4–8.3)
WBC # BLD: 7.7 K/UL (ref 3.5–11.3)
WBC # BLD: ABNORMAL 10*3/UL

## 2020-12-28 PROCEDURE — 2500000003 HC RX 250 WO HCPCS: Performed by: FAMILY MEDICINE

## 2020-12-28 PROCEDURE — 94669 MECHANICAL CHEST WALL OSCILL: CPT

## 2020-12-28 PROCEDURE — 2580000003 HC RX 258: Performed by: FAMILY MEDICINE

## 2020-12-28 PROCEDURE — 36430 TRANSFUSION BLD/BLD COMPNT: CPT

## 2020-12-28 PROCEDURE — 36415 COLL VENOUS BLD VENIPUNCTURE: CPT

## 2020-12-28 PROCEDURE — 85379 FIBRIN DEGRADATION QUANT: CPT

## 2020-12-28 PROCEDURE — 6360000002 HC RX W HCPCS: Performed by: INTERNAL MEDICINE

## 2020-12-28 PROCEDURE — 80053 COMPREHEN METABOLIC PANEL: CPT

## 2020-12-28 PROCEDURE — 6370000000 HC RX 637 (ALT 250 FOR IP): Performed by: INTERNAL MEDICINE

## 2020-12-28 PROCEDURE — 2580000003 HC RX 258: Performed by: NURSE PRACTITIONER

## 2020-12-28 PROCEDURE — 2580000003 HC RX 258: Performed by: INTERNAL MEDICINE

## 2020-12-28 PROCEDURE — P9016 RBC LEUKOCYTES REDUCED: HCPCS

## 2020-12-28 PROCEDURE — 85610 PROTHROMBIN TIME: CPT

## 2020-12-28 PROCEDURE — 85014 HEMATOCRIT: CPT

## 2020-12-28 PROCEDURE — 94640 AIRWAY INHALATION TREATMENT: CPT

## 2020-12-28 PROCEDURE — 94761 N-INVAS EAR/PLS OXIMETRY MLT: CPT

## 2020-12-28 PROCEDURE — 85025 COMPLETE CBC W/AUTO DIFF WBC: CPT

## 2020-12-28 PROCEDURE — 85018 HEMOGLOBIN: CPT

## 2020-12-28 PROCEDURE — 85730 THROMBOPLASTIN TIME PARTIAL: CPT

## 2020-12-28 PROCEDURE — 2700000000 HC OXYGEN THERAPY PER DAY

## 2020-12-28 PROCEDURE — 1200000000 HC SEMI PRIVATE

## 2020-12-28 PROCEDURE — 85384 FIBRINOGEN ACTIVITY: CPT

## 2020-12-28 RX ORDER — 0.9 % SODIUM CHLORIDE 0.9 %
250 INTRAVENOUS SOLUTION INTRAVENOUS ONCE
Status: COMPLETED | OUTPATIENT
Start: 2020-12-28 | End: 2020-12-28

## 2020-12-28 RX ADMIN — ALBUTEROL SULFATE 2 PUFF: 90 AEROSOL, METERED RESPIRATORY (INHALATION) at 15:29

## 2020-12-28 RX ADMIN — LEVOTHYROXINE SODIUM 150 MCG: 150 TABLET ORAL at 07:04

## 2020-12-28 RX ADMIN — PANTOPRAZOLE SODIUM 40 MG: 40 TABLET, DELAYED RELEASE ORAL at 16:53

## 2020-12-28 RX ADMIN — ALBUTEROL SULFATE 2 PUFF: 90 AEROSOL, METERED RESPIRATORY (INHALATION) at 09:37

## 2020-12-28 RX ADMIN — TAMSULOSIN HYDROCHLORIDE 0.4 MG: 0.4 CAPSULE ORAL at 08:26

## 2020-12-28 RX ADMIN — POTASSIUM CHLORIDE 20 MEQ: 1500 TABLET, EXTENDED RELEASE ORAL at 20:29

## 2020-12-28 RX ADMIN — METHYLPREDNISOLONE SODIUM SUCCINATE 40 MG: 40 INJECTION, POWDER, FOR SOLUTION INTRAMUSCULAR; INTRAVENOUS at 21:18

## 2020-12-28 RX ADMIN — PANTOPRAZOLE SODIUM 40 MG: 40 TABLET, DELAYED RELEASE ORAL at 07:04

## 2020-12-28 RX ADMIN — ASPIRIN 81 MG: 81 TABLET, COATED ORAL at 08:26

## 2020-12-28 RX ADMIN — MIDODRINE HYDROCHLORIDE 10 MG: 5 TABLET ORAL at 16:52

## 2020-12-28 RX ADMIN — METOPROLOL TARTRATE 25 MG: 25 TABLET, FILM COATED ORAL at 08:26

## 2020-12-28 RX ADMIN — RIVAROXABAN 20 MG: 20 TABLET, FILM COATED ORAL at 08:26

## 2020-12-28 RX ADMIN — DULOXETINE HYDROCHLORIDE 60 MG: 60 CAPSULE, DELAYED RELEASE ORAL at 08:26

## 2020-12-28 RX ADMIN — PREGABALIN 300 MG: 75 CAPSULE ORAL at 20:30

## 2020-12-28 RX ADMIN — REMDESIVIR 100 MG: 100 INJECTION, POWDER, LYOPHILIZED, FOR SOLUTION INTRAVENOUS at 11:29

## 2020-12-28 RX ADMIN — ALBUTEROL SULFATE 2 PUFF: 90 AEROSOL, METERED RESPIRATORY (INHALATION) at 20:32

## 2020-12-28 RX ADMIN — POTASSIUM CHLORIDE 20 MEQ: 1500 TABLET, EXTENDED RELEASE ORAL at 08:27

## 2020-12-28 RX ADMIN — METHYLPREDNISOLONE SODIUM SUCCINATE 40 MG: 40 INJECTION, POWDER, FOR SOLUTION INTRAMUSCULAR; INTRAVENOUS at 08:27

## 2020-12-28 RX ADMIN — TRAZODONE HYDROCHLORIDE 200 MG: 50 TABLET ORAL at 20:30

## 2020-12-28 RX ADMIN — MIDODRINE HYDROCHLORIDE 10 MG: 5 TABLET ORAL at 11:29

## 2020-12-28 RX ADMIN — OXYCODONE HYDROCHLORIDE AND ACETAMINOPHEN 1000 MG: 500 TABLET ORAL at 20:29

## 2020-12-28 RX ADMIN — OXYCODONE HYDROCHLORIDE AND ACETAMINOPHEN 1000 MG: 500 TABLET ORAL at 08:26

## 2020-12-28 RX ADMIN — Medication 2000 UNITS: at 08:26

## 2020-12-28 RX ADMIN — SODIUM CHLORIDE 250 ML: 9 INJECTION, SOLUTION INTRAVENOUS at 18:01

## 2020-12-28 RX ADMIN — MIDODRINE HYDROCHLORIDE 10 MG: 5 TABLET ORAL at 08:26

## 2020-12-28 RX ADMIN — PREGABALIN 300 MG: 75 CAPSULE ORAL at 08:26

## 2020-12-28 RX ADMIN — Medication 10 ML: at 08:27

## 2020-12-28 RX ADMIN — FUROSEMIDE 20 MG: 20 TABLET ORAL at 08:26

## 2020-12-28 RX ADMIN — ALBUTEROL SULFATE 2 PUFF: 90 AEROSOL, METERED RESPIRATORY (INHALATION) at 04:42

## 2020-12-28 RX ADMIN — ZINC SULFATE 220 MG (50 MG) CAPSULE 100 MG: CAPSULE at 08:26

## 2020-12-28 ASSESSMENT — PAIN SCALES - GENERAL
PAINLEVEL_OUTOF10: 0

## 2020-12-28 NOTE — PROGRESS NOTES
-----------------------------------------------------------------  Diagnostic Data: Reviewed    ASSESSMENT:   Principal Problem:    Pneumonia due to COVID-19 virus  Active Problems:    Iron deficiency anemia  Resolved Problems:    * No resolved hospital problems.  *        PLAN:  · Pneumonia due to Covid-19  · Daily meds  · IV Remdesivir  · Vit D, C, Zinc  · Solumedol  · Acapella  · OOB with meals  · Prone  · Continuous SPO2  · Supplemental oxygen -- 3L which is her baseline at home  · Anemia  · Hgb 7.3 today  · Transfuse 1 unit of PRBC today  · High Risk Meds:Remdesivir  · Discharge plan:  Pending  · PT/OT      RYAN Nam, NP-C

## 2020-12-28 NOTE — PROGRESS NOTES
Discussed discharge plans with the patient. Patient is a 77year old female here with Pneumonia due to COVID-19 virus. She is alert and oriented. Patient is  and lives at home with her . She has oxygen and a walker at home. The patient does the cleaning and the  does the cooking. She is independent with her ADL's. Patient manages her own medications and her  provides the transportation. She has [facility] coming in with nursing and physical therapy. Her PCP is Santi Juan MD. She has medical insurance that helps with medication costs. The discharge plan is home with her [facility] to resume. She does not have advance directives. LSW to monitor and assist with any needs or concerns as they arise.     BUZZ Balderas

## 2020-12-28 NOTE — PROGRESS NOTES
Patient observed for initial 15 minutes of blood transfusion. Vitals stable, no transfusion reaction suspected. Will continue to monitor patient.

## 2020-12-28 NOTE — PROGRESS NOTES
Comprehensive Nutrition Assessment    Type and Reason for Visit:  Initial    Nutrition Recommendations/Plan: continue with current diet. Nutrition Assessment:  Overewight/obesity r/t excess energy intakes aeb BMI 26.61. Pt states of good PO without any meal ingestion issues. Pt states of stable weight and denied any nutritional concerns. Pt admitted with COVI-19. H/H are low. Glucose 139. Malnutrition Assessment:  Malnutrition Status:  No malnutrition    Context:  Acute Illness     Findings of the 6 clinical characteristics of malnutrition:  Energy Intake:  No significant decrease in energy intake  Weight Loss:  No significant weight loss     Body Fat Loss:  No significant body fat loss     Muscle Mass Loss:  No significant muscle mass loss    Fluid Accumulation:  No significant fluid accumulation     Strength:  Not Performed    Nutrition Related Findings:  appears well nourished      Wounds:  None       Current Nutrition Therapies:    DIET GENERAL; No Added Salt (3-4 GM)    Anthropometric Measures:  · Height: 5' 4\" (162.6 cm)  · Current Body Weight: 155 lb (70.3 kg)   · Admission Body Weight: 154 lb 6.4 oz (70 kg)    · Usual Body Weight: 164 lb (74.4 kg)(10/22/20)     · Ideal Body Weight: 120 lbs; % Ideal Body Weight 129.2 %   · BMI: 26.6  · BMI Categories: Overweight (BMI 25.0-29. 9)       Nutrition Diagnosis:   · Overweight/Obese related to excessive energy intake as evidenced by BMI      Nutrition Interventions:   Food and/or Nutrient Delivery:  Continue Current Diet  Nutrition Education/Counseling:  No recommendation at this time   Coordination of Nutrition Care:  Continue to monitor while inpatient    Goals:  PO > 75% of meals      Recent Labs     12/26/20  1530 12/27/20  0525 12/28/20  0510    137 144   K 4.0 4.1 4.4   CL 98 100 107   CO2 31 28 28   BUN 9 11 14   CREATININE 0.58 0.59 0.45*   GLUCOSE 114* 131* 139*   ALT 14 14 11   ALKPHOS 51 50 44   GFR

## 2020-12-29 VITALS
WEIGHT: 153.3 LBS | OXYGEN SATURATION: 99 % | BODY MASS INDEX: 26.17 KG/M2 | HEIGHT: 64 IN | DIASTOLIC BLOOD PRESSURE: 72 MMHG | HEART RATE: 63 BPM | RESPIRATION RATE: 16 BRPM | SYSTOLIC BLOOD PRESSURE: 141 MMHG | TEMPERATURE: 97.9 F

## 2020-12-29 LAB
ABSOLUTE EOS #: 0 K/UL (ref 0–0.44)
ABSOLUTE IMMATURE GRANULOCYTE: 0 K/UL (ref 0–0.3)
ABSOLUTE LYMPH #: 0.5 K/UL (ref 1.1–3.7)
ABSOLUTE MONO #: 0.07 K/UL (ref 0.1–1.2)
ALBUMIN SERPL-MCNC: 2.8 G/DL (ref 3.5–5.2)
ALBUMIN/GLOBULIN RATIO: 0.8 (ref 1–2.5)
ALP BLD-CCNC: 44 U/L (ref 35–104)
ALT SERPL-CCNC: 10 U/L (ref 5–33)
ANION GAP SERPL CALCULATED.3IONS-SCNC: 6 MMOL/L (ref 9–17)
AST SERPL-CCNC: 14 U/L
BASOPHILS # BLD: 0 % (ref 0–2)
BASOPHILS ABSOLUTE: 0 K/UL (ref 0–0.2)
BILIRUB SERPL-MCNC: 0.37 MG/DL (ref 0.3–1.2)
BUN BLDV-MCNC: 16 MG/DL (ref 8–23)
BUN/CREAT BLD: 36 (ref 9–20)
CALCIUM SERPL-MCNC: 8.9 MG/DL (ref 8.6–10.4)
CHLORIDE BLD-SCNC: 107 MMOL/L (ref 98–107)
CO2: 29 MMOL/L (ref 20–31)
CREAT SERPL-MCNC: 0.45 MG/DL (ref 0.5–0.9)
D-DIMER QUANTITATIVE: 0.81 MG/L FEU (ref 0–0.59)
DIFFERENTIAL TYPE: ABNORMAL
EOSINOPHILS RELATIVE PERCENT: 0 % (ref 1–4)
FIBRINOGEN: 363 MG/DL (ref 179–518)
GFR AFRICAN AMERICAN: >60 ML/MIN
GFR NON-AFRICAN AMERICAN: >60 ML/MIN
GFR SERPL CREATININE-BSD FRML MDRD: ABNORMAL ML/MIN/{1.73_M2}
GFR SERPL CREATININE-BSD FRML MDRD: ABNORMAL ML/MIN/{1.73_M2}
GLUCOSE BLD-MCNC: 129 MG/DL (ref 70–99)
HCT VFR BLD CALC: 28.6 % (ref 36.3–47.1)
HEMOGLOBIN: 8.5 G/DL (ref 11.9–15.1)
IMMATURE GRANULOCYTES: 0 %
INR BLD: 1.4
LYMPHOCYTES # BLD: 7 % (ref 24–43)
MCH RBC QN AUTO: 26.8 PG (ref 25.2–33.5)
MCHC RBC AUTO-ENTMCNC: 29.7 G/DL (ref 28.4–34.8)
MCV RBC AUTO: 90.2 FL (ref 82.6–102.9)
MONOCYTES # BLD: 1 % (ref 3–12)
MORPHOLOGY: NORMAL
NRBC AUTOMATED: 0 PER 100 WBC
PARTIAL THROMBOPLASTIN TIME: 38.7 SEC (ref 23.9–33.8)
PDW BLD-RTO: 16.6 % (ref 11.8–14.4)
PLATELET # BLD: 273 K/UL (ref 138–453)
PLATELET ESTIMATE: ABNORMAL
PMV BLD AUTO: 10.3 FL (ref 8.1–13.5)
POTASSIUM SERPL-SCNC: 4.5 MMOL/L (ref 3.7–5.3)
PROTHROMBIN TIME: 17.4 SEC (ref 11.5–14.2)
RBC # BLD: 3.17 M/UL (ref 3.95–5.11)
RBC # BLD: ABNORMAL 10*6/UL
SEG NEUTROPHILS: 92 % (ref 36–65)
SEGMENTED NEUTROPHILS ABSOLUTE COUNT: 6.53 K/UL (ref 1.5–8.1)
SODIUM BLD-SCNC: 142 MMOL/L (ref 135–144)
TOTAL PROTEIN: 6.1 G/DL (ref 6.4–8.3)
WBC # BLD: 7.1 K/UL (ref 3.5–11.3)
WBC # BLD: ABNORMAL 10*3/UL

## 2020-12-29 PROCEDURE — 6360000002 HC RX W HCPCS: Performed by: INTERNAL MEDICINE

## 2020-12-29 PROCEDURE — 85025 COMPLETE CBC W/AUTO DIFF WBC: CPT

## 2020-12-29 PROCEDURE — 36415 COLL VENOUS BLD VENIPUNCTURE: CPT

## 2020-12-29 PROCEDURE — 6370000000 HC RX 637 (ALT 250 FOR IP): Performed by: INTERNAL MEDICINE

## 2020-12-29 PROCEDURE — 97166 OT EVAL MOD COMPLEX 45 MIN: CPT

## 2020-12-29 PROCEDURE — 94640 AIRWAY INHALATION TREATMENT: CPT

## 2020-12-29 PROCEDURE — 94761 N-INVAS EAR/PLS OXIMETRY MLT: CPT

## 2020-12-29 PROCEDURE — 2580000003 HC RX 258: Performed by: INTERNAL MEDICINE

## 2020-12-29 PROCEDURE — 94669 MECHANICAL CHEST WALL OSCILL: CPT

## 2020-12-29 PROCEDURE — 80053 COMPREHEN METABOLIC PANEL: CPT

## 2020-12-29 PROCEDURE — 97162 PT EVAL MOD COMPLEX 30 MIN: CPT

## 2020-12-29 PROCEDURE — 85379 FIBRIN DEGRADATION QUANT: CPT

## 2020-12-29 PROCEDURE — 97530 THERAPEUTIC ACTIVITIES: CPT

## 2020-12-29 PROCEDURE — 2700000000 HC OXYGEN THERAPY PER DAY

## 2020-12-29 PROCEDURE — 85730 THROMBOPLASTIN TIME PARTIAL: CPT

## 2020-12-29 PROCEDURE — 85384 FIBRINOGEN ACTIVITY: CPT

## 2020-12-29 PROCEDURE — 85610 PROTHROMBIN TIME: CPT

## 2020-12-29 RX ORDER — CHOLECALCIFEROL (VITAMIN D3) 50 MCG
2000 TABLET ORAL DAILY
Qty: 7 TABLET | Refills: 0 | Status: ON HOLD | OUTPATIENT
Start: 2020-12-30 | End: 2022-05-02 | Stop reason: HOSPADM

## 2020-12-29 RX ORDER — ZINC SULFATE 50(220)MG
100 CAPSULE ORAL DAILY
Qty: 14 CAPSULE | Refills: 0 | COMMUNITY
Start: 2020-12-30 | End: 2021-04-22

## 2020-12-29 RX ADMIN — OXYCODONE HYDROCHLORIDE AND ACETAMINOPHEN 1000 MG: 500 TABLET ORAL at 08:16

## 2020-12-29 RX ADMIN — METOPROLOL TARTRATE 25 MG: 25 TABLET, FILM COATED ORAL at 08:16

## 2020-12-29 RX ADMIN — ALBUTEROL SULFATE 2 PUFF: 90 AEROSOL, METERED RESPIRATORY (INHALATION) at 10:28

## 2020-12-29 RX ADMIN — METHYLPREDNISOLONE SODIUM SUCCINATE 40 MG: 40 INJECTION, POWDER, FOR SOLUTION INTRAMUSCULAR; INTRAVENOUS at 08:16

## 2020-12-29 RX ADMIN — ZINC SULFATE 220 MG (50 MG) CAPSULE 100 MG: CAPSULE at 08:15

## 2020-12-29 RX ADMIN — MIDODRINE HYDROCHLORIDE 10 MG: 5 TABLET ORAL at 08:15

## 2020-12-29 RX ADMIN — LEVOTHYROXINE SODIUM 150 MCG: 150 TABLET ORAL at 06:53

## 2020-12-29 RX ADMIN — MIDODRINE HYDROCHLORIDE 10 MG: 5 TABLET ORAL at 12:00

## 2020-12-29 RX ADMIN — Medication 2000 UNITS: at 08:16

## 2020-12-29 RX ADMIN — RIVAROXABAN 20 MG: 20 TABLET, FILM COATED ORAL at 08:15

## 2020-12-29 RX ADMIN — Medication 10 ML: at 08:16

## 2020-12-29 RX ADMIN — FUROSEMIDE 20 MG: 20 TABLET ORAL at 08:15

## 2020-12-29 RX ADMIN — DULOXETINE HYDROCHLORIDE 60 MG: 60 CAPSULE, DELAYED RELEASE ORAL at 08:16

## 2020-12-29 RX ADMIN — PANTOPRAZOLE SODIUM 40 MG: 40 TABLET, DELAYED RELEASE ORAL at 06:53

## 2020-12-29 RX ADMIN — ASPIRIN 81 MG: 81 TABLET, COATED ORAL at 08:16

## 2020-12-29 RX ADMIN — ALBUTEROL SULFATE 2 PUFF: 90 AEROSOL, METERED RESPIRATORY (INHALATION) at 06:14

## 2020-12-29 RX ADMIN — PREGABALIN 300 MG: 75 CAPSULE ORAL at 08:15

## 2020-12-29 RX ADMIN — POTASSIUM CHLORIDE 20 MEQ: 1500 TABLET, EXTENDED RELEASE ORAL at 08:16

## 2020-12-29 RX ADMIN — TAMSULOSIN HYDROCHLORIDE 0.4 MG: 0.4 CAPSULE ORAL at 08:16

## 2020-12-29 ASSESSMENT — PAIN SCALES - GENERAL
PAINLEVEL_OUTOF10: 0
PAINLEVEL_OUTOF10: 0

## 2020-12-29 NOTE — PROGRESS NOTES
Occupational Therapy   Occupational Therapy Initial Assessment  Date: 2020   Patient Name: Anni Holt  MRN: [id number]     : 1945    Date of Service: 2020    Discharge Recommendations:  Home with Home health OT       Assessment   Performance deficits / Impairments: Decreased functional mobility ; Decreased high-level IADLs;Decreased endurance;Decreased strength;Decreased ADL status; Decreased balance  Assessment: Pt is a 77 y.o. female presenting to this facility following COVID-19 dx. Pt is agreeable to OT eval and tolerates session well. Treatment Diagnosis: COVID-19  Prognosis: Good  Decision Making: Medium Complexity  OT Education: OT Role;Plan of Care;Precautions  Patient Education: Pt educated on POC and safety upon return home. Barriers to Learning: None  REQUIRES OT FOLLOW UP: Yes  Activity Tolerance  Activity Tolerance: Patient Tolerated treatment well  Activity Tolerance: Pt tolerating session well. Patient Diagnosis(es): The primary encounter diagnosis was Pneumonia due to COVID-19 virus. A diagnosis of Respiratory distress was also pertinent to this visit. has a past medical history of COPD (chronic obstructive pulmonary disease) ([address] Depression, GERD (gastroesophageal reflux disease), Osteoporosis, and Pneumonia. has a past surgical history that includes Hysterectomy; back surgery; Breast surgery; Carpal tunnel release; joint replacement; joint replacement; fracture surgery (Left, 2018); and Wrist fracture surgery (Left, 2018).     Treatment Diagnosis: COVID-19      Restrictions  Restrictions/Precautions  Restrictions/Precautions: General Precautions, Fall Risk, Isolation    Subjective   General  Chart Reviewed: Yes  Patient assessed for rehabilitation services?: Yes  Family / Caregiver Present: No  Referring Practitioner: Dr. Demi Short  Diagnosis: COVID-19  Subjective  Subjective: \"I'm going home today\"  General Comment  Comments: No pain, 0/10  Patient Observation  Observations: No distress. Call light within reach. Acapella X 10 breaths. All needs met. Social/Functional History  Social/Functional History  Lives With: Spouse  Type of Home: House  Home Layout: Two level, Able to Live on Main level with bedroom/bathroom  Home Access: Stairs to enter with rails  Entrance Stairs - Number of Steps: 1 step, than another step  Entrance Stairs - Rails: Both  Bathroom Shower/Tub: Tub/Shower unit  Bathroom Toilet: Standard(arms on both sides)  Bathroom Equipment: Grab bars in shower, Shower chair, Grab bars around toilet  Home Equipment: Rolling walker, [employer], Oxygen  Receives Help From: Family  ADL Assistance: Independent  Homemaking Assistance: Needs assistance  Ambulation Assistance: Independent  Transfer Assistance: Independent  Occupation: Retired  Additional Comments: Has nursing care and PT coming to the home. Uses RW as needed; mostly in the mornings.        Objective   Vision: Within Functional Limits  Hearing: Within functional limits    Orientation  Overall Orientation Status: Within Normal Limits     Balance  Sitting Balance: Independent  Standing Balance: Supervision  Toilet Transfers  Toilet - Technique: Ambulating  Toilet Transfer: Supervision  ADL  Feeding: Independent  Grooming: Independent  UE Bathing: Supervision  LE Bathing: Supervision  UE Dressing: Independent  LE Dressing: Supervision  Toileting: Supervision           Transfers  Sit to stand: Supervision  Stand to sit: Supervision     Cognition  Overall Cognitive Status: WNL  Perception  Overall Perceptual Status: WFL     Sensation  Overall Sensation Status: WNL        LUE AROM (degrees)  LUE AROM : WFL  Left Hand AROM (degrees)  Left Hand AROM: WFL  RUE AROM (degrees)  RUE AROM : WFL  Right Hand AROM (degrees)  Right Hand AROM: WFL  LUE Strength  Gross LUE Strength: Exceptions to [facility] [place]  L Hand General: 3+/5  RUE Strength  Gross RUE Strength: Exceptions to [facility] [place]  R Hand General: 3+/5 Plan   Plan  Times per week: 7  Times per day: Daily  Current Treatment Recommendations: Strengthening, Endurance Training, Self-Care / ADL, Balance Training, Functional Mobility Training, Safety Education & Training    G-Code     OutComes Score                                                  AM-PAC Score        AM-PAC Inpatient Daily Activity Raw Score: 21 (12/29/20 1114)  AM-PAC Inpatient ADL T-Scale Score : 44.27 (12/29/20 1114)  ADL Inpatient CMS 0-100% Score: 32.79 (12/29/20 1114)  ADL Inpatient CMS G-Code Modifier : [place] (12/29/20 1114)    Goals  Short term goals  Time Frame for Short term goals: 10 days  Short term goal 1: Pt will demonstrate 100% competency in BUE HEP to facilitate UE strength required for ADLs. Short term goal 2: Pt will perform toilet transfer with mod I with no loss of balance. Short term goal 3: Pt will perform UB/LB dressing with mod I using AE as needed with no loss of balance.   Patient Goals   Patient goals : \"Just to get better\"       Therapy Time   Individual Concurrent Group Co-treatment   Time In 1057         Time Out 1117         Minutes 9330 Fl-54, OTD, OTR/L

## 2020-12-29 NOTE — PROGRESS NOTES
Physical Therapy    Facility/Department: [facility] MED SURG  Initial Assessment    NAME: Laura Miller  : 1945  MRN: [id number]    Date of Service: 2020    Discharge Recommendations:  Patient would benefit from continued therapy after discharge      Assessment   Assessment: Pt is 78 y/o female with general weakness due to recent illness and prior hospitalization; will benefit from PT. Treatment Diagnosis: general weakness  Prognosis: Good  Decision Making: Medium Complexity  Patient Education: PT eval and POC  REQUIRES PT FOLLOW UP: Yes  Activity Tolerance  Activity Tolerance: Patient Tolerated treatment well       Patient Diagnosis(es): The primary encounter diagnosis was Pneumonia due to COVID-19 virus. A diagnosis of Respiratory distress was also pertinent to this visit. has a past medical history of COPD (chronic obstructive pulmonary disease) ([address] Depression, GERD (gastroesophageal reflux disease), Osteoporosis, and Pneumonia. has a past surgical history that includes Hysterectomy; back surgery; Breast surgery; Carpal tunnel release; joint replacement; joint replacement; fracture surgery (Left, 2018); and Wrist fracture surgery (Left, 2018). Restrictions  Restrictions/Precautions  Restrictions/Precautions: General Precautions, Fall Risk     Vision/Hearing  Vision: Within Functional Limits  Hearing: Within functional limits       Subjective  General  Chart Reviewed: Yes  Response To Previous Treatment: Not applicable  Family / Caregiver Present: No  Follows Commands: Within Functional Limits  General Comment  Comments: Hemoglobin currently 7.3; will be receiving blood transfusion shortly per RN. Subjective  Subjective: Was doing HH after prolong hospital stay last month.   Pain Screening  Patient Currently in Pain: Denies    Orientation  Orientation  Overall Orientation Status: Within Normal Limits     Social/Functional History  Social/Functional History  Lives With: Spouse  Type of Home: House  Home Layout: Two level, Able to Live on Main level with bedroom/bathroom  Home Access: Stairs to enter with rails  Entrance Stairs - Number of Steps: 1 step, than another step  Entrance Stairs - Rails: Both  Home Equipment: Rolling walker, Wheelchair-manual, Oxygen  Receives Help From: Family  ADL Assistance: Independent  Homemaking Assistance: Needs assistance  Ambulation Assistance: Independent  Transfer Assistance: Independent  Additional Comments: Has nursing care and PT coming to the home. Uses RW as needed; mostly in the mornings. Cognition   Cognition  Overall Cognitive Status: WFL    Objective  AROM RLE (degrees)  RLE AROM: WFL  AROM LLE (degrees)  LLE AROM : WFL     Strength RLE  Comment: grossly 4-/5  Strength LLE  Comment: grossly 4/5        Bed mobility  Comment: Pt in bathroom at time of eval and returned to chair     Transfers  Sit to Stand: Stand by assistance;Contact guard assistance  Stand to sit: Stand by assistance;Contact guard assistance     Ambulation  Ambulation?: Yes  Ambulation 1  Surface: level tile  Device: No Device  Other Apparatus: O2  Assistance: Contact guard assistance  Distance: 10ft     Balance  Sitting - Dynamic: Good  Standing - Static: Good;Fair  Standing - Dynamic: Fair;Good        Plan   Plan  Times per week: 7 x week  Times per day: Twice a day(daily on weekend)  Current Treatment Recommendations: Strengthening, Neuromuscular Re-education, Home Exercise Program, Safety Education & Training, Balance Training, Endurance Training, Patient/Caregiver Education & Training, Functional Mobility Training, Gait Training, Stair training, Transfer Training  Safety Devices  Type of devices:  All fall risk precautions in place      AM-PAC Score  AM-PAC Inpatient Mobility without Stair Climbing Raw Score : 15 (12/29/20 0709)  AM-PAC Inpatient without Stair Climbing T-Scale Score : 43.03 (12/29/20 0709)  Mobility Inpatient CMS 0-100% Score: 47.43 (12/29/20 0395)  Mobility Inpatient without Stair CMS G-Code Modifier : CK (12/29/20 0709)       Goals  Short term goals  Time Frame for Short term goals: 14 days  Short term goal 1: Pt to ambulate 75ft without AD and no LOB. Short term goal 2: Pt to demonstrate good standing balance for decrease fall risk. Short term goal 3: Pt to tolerate 20-30 minutes of ther ex/act for improved strength and endurance with ADL's. Short term goal 4: Pt to perform all bed mob, transfers, and gait independently.   Patient Goals   Patient goals : home following discharge       Therapy Time   Individual Concurrent Group Co-treatment   Time In 1731         Time Out 1754         Minutes 23                 Glenny Kang, PT, DPT, CMPT

## 2020-12-29 NOTE — PROGRESS NOTES
Progress Note    SUBJECTIVE:  F/u on SOB    OBJECTIVE:  Sitting up in the chair alert and oriented in no acute distress. Afebrile. Currently using 3L of oxygen per NC which is her baseline. Tolerating diet and activity well. Denies n/v/d    Vitals:   Vitals:    12/29/20 0647   BP: (!) 141/72   Pulse: 63   Resp: 16   Temp: 97.9 °F (36.6 °C)   SpO2: 99%     Weight: 153 lb 4.8 oz (69.5 kg)   Height: 5' 4\" (162.6 cm)   -----------------------------------------------------------------  Exam:    CONSTITUTIONAL:  awake, alert, cooperative, no apparent distress, and appears stated age  EYES:  Lids and lashes normal, pupils equal, round and reactive to light, extra ocular muscles intact, sclera clear, conjunctiva normal  ENT:  Normocephalic, without obvious abnormality, atraumatic, sinuses nontender on palpation, external ears without lesions, oral pharynx with moist mucus membranes, tonsils without erythema or exudates, gums normal and good dentition. NECK:  Supple, symmetrical, trachea midline, no adenopathy, thyroid symmetric, not enlarged and no tenderness, skin normal  HEMATOLOGIC/LYMPHATICS:  no cervical lymphadenopathy and no supraclavicular lymphadenopathy  LUNGS:  no increased work of breathing, good air exchange and crackles right base and left base  CARDIOVASCULAR:  Normal apical impulse, regular rate and rhythm, normal S1 and S2, no S3 or S4, and no murmur noted  ABDOMEN:  No scars, normal bowel sounds, soft, non-distended, non-tender, no masses palpated, no hepatosplenomegally  MUSCULOSKELETAL:  There is no redness, warmth, or swelling of the joints. Full range of motion noted. Motor strength is 5 out of 5 all extremities bilaterally.   Tone is normal.  NEUROLOGIC:  Mental Status Exam:  Level of Alertness:   awake  Orientation:   person, place, time  Memory:   normal  SKIN:  no bruising or bleeding, normal skin color, texture, turgor, no redness, warmth, or swelling, no rashes and no lesions  EXT:     no cyanosis, clubbing or edema present    -----------------------------------------------------------------  Diagnostic Data: Reviewed    ASSESSMENT:   Principal Problem:    Pneumonia due to COVID-19 virus  Active Problems:    Iron deficiency anemia  Resolved Problems:    * No resolved hospital problems. *        PLAN:  · Pneumonia due to Covid-19  ? Daily meds  ? Continue IV Remdesivir  ? Continue Vit D, C, Zinc  ? Continue Solumedol  ? Acapella  ? OOB with meals  ? Prone  ? Continuous SPO2  ? Supplemental oxygen -- 3L which is her baseline at home  · Anemia  ? Hgb 8.5 today  ? Transfuse 1 unit of PRBC yesterday  · High Risk Meds:Remdesivir  · Discharge plan: today  ?  PT/OT         Ina Thompson , APRN, NP-C

## 2020-12-29 NOTE — PROGRESS NOTES
Pt sitting up in chair at this time. Vitals and assessment as charted. Pt denies pain at this time. SpO2 was 98%on 2L NC. Lungs are clear with fine crackles. Call light within reach.

## 2020-12-29 NOTE — DISCHARGE SUMMARY
Discharge Summary    Madhu Abdi  :  1945  MRN:  [id number]    Admit date:  2020      Discharge date: 2020     Admitting Physician:  Brittny Frances MD    Discharge Diagnoses:    Principal Problem:    Pneumonia due to COVID-19 virus  Active Problems:    Iron deficiency anemia  Resolved Problems:    * No resolved hospital problems. [facility] Course: Madhu Abdi is a 77 y.o. female admitted with PN due to Covid-19. She presented to the ER for evaluation of increased SOB. She does have a long history of COPD and currently uses 3L of oxygen at home continuously. She recently was admitted for CAP with Respiratory failure and was placed on the Ventilator and was transferred to an Acute Rehab center and was discharged on Dec. 15th. She was complaining of increased SOB, productive cough with yellow sputum, fever, fatigue, and decreased appetite. She was evaluated and admitted with Covid-19 PN. She was placed on Steroids and Remdesivir. She is currently back to her baseline of 3L per NC and is no acute distress. She has no complaints. She denies n/v/d. Tolerating diet and activity well. I will discharge home today. Consultants:  none    Procedures: none    Complications: none    Discharge Condition: good    Exam:  CONSTITUTIONAL:  awake, alert, cooperative, no apparent distress, and appears stated age  EYES:  Lids and lashes normal, pupils equal, round and reactive to light, extra ocular muscles intact, sclera clear, conjunctiva normal  ENT:  Normocephalic, without obvious abnormality, atraumatic, sinuses nontender on palpation, external ears without lesions, oral pharynx with moist mucus membranes, tonsils without erythema or exudates, gums normal and good dentition.   NECK:  Supple, symmetrical, trachea midline, no adenopathy, thyroid symmetric, not enlarged and no tenderness, skin normal  HEMATOLOGIC/LYMPHATICS:  no cervical lymphadenopathy and no supraclavicular FINDINGS: Interval development of extensive bilateral perihilar opacification. Additional right greater than left bilateral opacities also present similar to prior examination. No effusion or pneumothorax. Cardiomediastinal silhouette is stable. Hiatal hernia is noted. Stimulator leads and partially visualized left shoulder arthroplasty. *Interval development of extensive bilateral perihilar opacification which correlate to pulmonary edema, multifocal pneumonia and/or other infectious inflammatory process. *Additional right greater than left bilateral opacities also present similar to prior examination which may be on chronic basis. Assessment and Plan:  Patient Active Problem List    Diagnosis Date Noted    Acute metabolic encephalopathy 82/51/6408     Priority: High     Class: Acute    Pneumonia due to COVID-19 virus 12/26/2020    Sepsis with acute hypoxic respiratory failure and septic shock (HCC)     Biventricular congestive heart failure (HCC)     Encephalopathy     Acute respiratory failure with hypoxia and hypercapnia (HCC)     Severe sepsis ([address] 10/22/2020    COPD exacerbation ([address] 10/22/2020    Sepsis due to pneumonia ([facility] 08/16/2020    Lactic acidosis 08/16/2020    Septic shock ([address] 08/16/2020    Status post surgery 12/20/2018    Pneumonia of both lungs due to infectious organism 10/05/2018    Hypothyroidism 10/05/2018    Major depressive disorder 10/05/2018    Chronic midline low back pain without sciatica 10/05/2018    Iron deficiency anemia 10/05/2018        Discharge Medications:        Rah Das   [place] Medication Instructions NSQ:[id number]    Printed on:12/29/20 1104   Medication Information                      albuterol sulfate  (90 Base) MCG/ACT inhaler  Inhale 2 puffs into the lungs 4 times daily             alendronate (FOSAMAX) 70 MG tablet  Take 70 mg by mouth every 7 days             ascorbic acid (VITAMIN C) 1000 MG tablet  Take 1 tablet by mouth 2 times daily for 7 days             aspirin 81 MG tablet  Take 81 mg by mouth daily             calcium citrate-vitamin D (CITRICAL + D) 315-250 MG-UNIT TABS per tablet  Take 1 tablet by mouth 2 times daily (with meals)             DULoxetine (CYMBALTA) 60 MG extended release capsule  Take 60 mg by mouth daily             furosemide (LASIX) 20 MG tablet  Take 1 tablet by mouth daily             HYDROcodone-acetaminophen (NORCO)  MG per tablet  Take 1 tablet by mouth every 6 hours as needed for Pain. Hydroxychloroquine Sulfate (PLAQUENIL PO)  Take by mouth             levothyroxine (SYNTHROID) 150 MCG tablet  Take 150 mcg by mouth Daily             metoprolol tartrate (LOPRESSOR) 25 MG tablet  Take 1 tablet by mouth 2 times daily             midodrine (PROAMATINE) 10 MG tablet  Take 1 tablet by mouth 3 times daily (with meals)             pantoprazole sodium (PROTONIX) 40 MG PACK packet  Take 40 mg by mouth 2 times daily (before meals)             potassium chloride (KLOR-CON) 20 MEQ packet  Take 20 mEq by mouth 2 times daily             pregabalin (LYRICA) 300 MG capsule  Take 1 capsule by mouth 2 times daily for 30 days. rivaroxaban (XARELTO) 20 MG TABS tablet  Take 1 tablet by mouth daily (with breakfast)             spironolactone (ALDACTONE) 25 MG tablet  Take 1 tablet by mouth daily             tamsulosin (FLOMAX) 0.4 MG capsule  Take 1 capsule by mouth daily             traZODone (DESYREL) 100 MG tablet  Take 200 mg by mouth nightly              Vitamin D (CHOLECALCIFEROL) 50 MCG (2000 UT) TABS tablet  Take 1 tablet by mouth daily for 7 days             zinc sulfate (ZINCATE) 220 (50 Zn) MG capsule  Take 2 capsules by mouth daily for 7 days                 Patient Instructions:    Activity: activity as tolerated  Diet: encourage fluids  Wound Care: none needed  Other: None     Disposition:   Discharge to Home    Follow up:  Patient will be followed by Vidhi Monte MD in 1-2 weeks    CORE MEASURES on Discharge (if applicable)  ACE/ARB in CHF: NA  Statin in MI: NA  ASA in MI: NA  Statin in CVA: NA  Antiplatelet in CVA: NA    Total time spent on discharge services: 40 minutes    Including the following activities:  Evaluation and Management of patient  Discussion with patient and/or surrogate about current care plan  Coordination with Case Management and/or   Coordination of care with Consultants (if applicable)   Coordination of care with Receiving Facility Physician (if applicable)  Completion of DME forms (if applicable)  Preparation of Discharge Summary  Preparation of Medication Reconciliation  Preparation of Discharge Prescriptions    Signed:  RYAN Park, NP-C  12/29/2020, 11:04 AM

## 2020-12-29 NOTE — PROGRESS NOTES
[facility]  Inpatient/Observation/Outpatient Rehabilitation    Date: 2020  Patient Name: Shy Cedeno       [x] Inpatient Acute/Observation       []  Outpatient  : 1945       [] Pt no showed for scheduled appointment    [] Pt refused/declined therapy at this time due to:           [x] Pt cancelled due to:  [] No Reason Given   [] Sick/ill   [x] Other:    Eric Le not seen at 11:30 am for physical therapy. Ari Weeks with patient reviewing discharge orders.       Kim Molina, PTA Date: 2020

## 2020-12-29 NOTE — DISCHARGE INSTR - DIET

## 2020-12-30 ENCOUNTER — CARE COORDINATION (OUTPATIENT)
Dept: CASE MANAGEMENT | Age: 75
End: 2020-12-30

## 2020-12-30 LAB
ABO/RH: NORMAL
ANTIBODY SCREEN: NEGATIVE
ARM BAND NUMBER: NORMAL
BLD PROD TYP BPU: NORMAL
CROSSMATCH RESULT: NORMAL
DISPENSE STATUS BLOOD BANK: NORMAL
EXPIRATION DATE: NORMAL
TRANSFUSION STATUS: NORMAL
UNIT DIVISION: 0
UNIT NUMBER: NORMAL

## 2020-12-31 ENCOUNTER — CARE COORDINATION (OUTPATIENT)
Dept: CASE MANAGEMENT | Age: 75
End: 2020-12-31

## 2020-12-31 NOTE — CARE COORDINATION
[facility] Transitions Initial Follow Up Call- BPCI/ COVID risk follow up call    Call within 2 business days of discharge: Yes    Patient: Cleopatra Whitfield Patient : 1945   MRN: <[id number]>  Reason for Admission: pneumonia d/t COVID-19, respiratory distress   Discharge Date: 20 RARS: Readmission Risk Score: 32      Last Discharge [facility]       Complaint Diagnosis Description Type Department Provider    20 Fatigue; Shortness of Breath; Chest Pain; Cough Pneumonia due to COVID-19 virus . .. ED to Hosp-Admission (Discharged) ([facility]) Beth Laboy MD; Jasson Johnson. .. Spoke with: Toshia    Call to pt who states she is doing \"fine\". States her breathing has been better and nurse this morning told her the oxygen level was great   States she wears the oxygen   Call disconnected.   Writer attempted multiple times to call back- both busy and ring without answer or VM  2nd attempt BPCI call           Care Transitions 24 Hour Call    Do you have any ongoing symptoms?: No  Do you have a copy of your discharge instructions?: Yes  Do you have all of your prescriptions and are they filled?: Yes  Have you been contacted by a [facility] [address]?: No  Have you scheduled your follow up appointment?: Yes  How are you going to get to your appointment?: Car - family or friend to transport  Were you discharged with any Home Care or Post Acute Services: Yes  Post Acute Services: [address] [facility]  Do you feel like you have everything you need to keep you well at home?: Yes  Care Transitions Interventions         Follow Up  Future Appointments   Date Time Provider Marietta Leon   2021  1:00 PM Shae Irene MD TIFF CARD [facility]       Nitza Asencio RN

## 2021-01-06 ENCOUNTER — CARE COORDINATION (OUTPATIENT)
Dept: CASE MANAGEMENT | Age: 76
End: 2021-01-06

## 2021-01-06 NOTE — CARE COORDINATION
[facility] Transitions Follow Up Call    2021    Patient: Brant Garvey  Patient : 1945   MRN: <[id number]>  Reason for Admission: Sepsis   Discharge Date: 20 RARS: Readmission Risk Score: 32         Received message the called party is temporarily unavailable.      Follow Up  Future Appointments   Date Time Provider Marietta Leon   2021  1:00 PM Joel Knight MD TIFF CARD [facility]       Duncan Viramontes RN

## 2021-01-13 ENCOUNTER — CARE COORDINATION (OUTPATIENT)
Dept: CASE MANAGEMENT | Age: 76
End: 2021-01-13

## 2021-01-13 NOTE — CARE COORDINATION
[facility] Transitions Follow Up Call    2021    Patient: Leigh Layton  Patient : 1945   MRN: <[id number]>  Reason for Admission:   Discharge Date: 20 RARS: Readmission Risk Score: 32         Spoke with:  Patient, [address] [place] [address] Transitions Subsequent and Final Call    Subsequent and Final Calls  Do you have any ongoing symptoms?: Yes  Patient-reported symptoms: Shortness of Breath  -patient reports shortness of breath with exertion; reports SpO2 94%-97% RA  -patient reports she has not utilized supplemental oxygen  Do you have any questions related to your medications?: No  Do you currently have any active services?: No  Are you currently active with any services?: Home Health  -services completed  Do you have any needs or concerns that I can assist you with?: No  Identified Barriers: None  Care Transitions Interventions  No Identified Needs    Patient is pleasant in conversation and states she is doing fine.   -denies fever/chills, cough, or chest discomfort   -reports good appetite with normal bladder/bowel elimination   -reports good appetite   -taking prescribed medications as ordered     Emotional support provided; will continue to follow.      Follow Up  Future Appointments   Date Time Provider Marietta Leon   2021  1:00 PM [facility]       Hugo Tejada RN

## 2021-01-18 ENCOUNTER — HOSPITAL ENCOUNTER (OUTPATIENT)
Age: 76
Discharge: HOME | End: 2021-01-18
Payer: MEDICARE

## 2021-01-18 ENCOUNTER — OFFICE VISIT (OUTPATIENT)
Dept: CARDIOLOGY | Age: 76
End: 2021-01-18
Payer: MEDICARE

## 2021-01-18 VITALS
DIASTOLIC BLOOD PRESSURE: 68 MMHG | HEART RATE: 88 BPM | BODY MASS INDEX: 24.31 KG/M2 | WEIGHT: 142.4 LBS | HEIGHT: 64 IN | SYSTOLIC BLOOD PRESSURE: 119 MMHG | OXYGEN SATURATION: 94 % | RESPIRATION RATE: 18 BRPM

## 2021-01-18 DIAGNOSIS — R06.02 SOB (SHORTNESS OF BREATH): ICD-10-CM

## 2021-01-18 DIAGNOSIS — J96.91 RESPIRATORY FAILURE WITH HYPOXIA AND HYPERCAPNIA, UNSPECIFIED CHRONICITY (HCC): ICD-10-CM

## 2021-01-18 DIAGNOSIS — I50.42 CHRONIC COMBINED SYSTOLIC AND DIASTOLIC CONGESTIVE HEART FAILURE (HCC): Primary | ICD-10-CM

## 2021-01-18 DIAGNOSIS — Z86.79 HISTORY OF ATRIAL FIBRILLATION: ICD-10-CM

## 2021-01-18 DIAGNOSIS — J44.9 CHRONIC OBSTRUCTIVE PULMONARY DISEASE, UNSPECIFIED COPD TYPE (HCC): ICD-10-CM

## 2021-01-18 DIAGNOSIS — J96.92 RESPIRATORY FAILURE WITH HYPOXIA AND HYPERCAPNIA, UNSPECIFIED CHRONICITY (HCC): ICD-10-CM

## 2021-01-18 DIAGNOSIS — I50.42 CHRONIC COMBINED SYSTOLIC AND DIASTOLIC CONGESTIVE HEART FAILURE (HCC): ICD-10-CM

## 2021-01-18 LAB
ANION GAP SERPL CALCULATED.3IONS-SCNC: 6 MMOL/L (ref 9–17)
BNP INTERPRETATION: NORMAL
BUN BLDV-MCNC: 12 MG/DL (ref 8–23)
BUN/CREAT BLD: 20 (ref 9–20)
CALCIUM SERPL-MCNC: 9.3 MG/DL (ref 8.6–10.4)
CHLORIDE BLD-SCNC: 99 MMOL/L (ref 98–107)
CO2: 32 MMOL/L (ref 20–31)
CREAT SERPL-MCNC: 0.6 MG/DL (ref 0.5–0.9)
GFR AFRICAN AMERICAN: >60 ML/MIN
GFR NON-AFRICAN AMERICAN: >60 ML/MIN
GFR SERPL CREATININE-BSD FRML MDRD: ABNORMAL ML/MIN/{1.73_M2}
GFR SERPL CREATININE-BSD FRML MDRD: ABNORMAL ML/MIN/{1.73_M2}
GLUCOSE BLD-MCNC: 92 MG/DL (ref 70–99)
HCT VFR BLD CALC: 29.5 % (ref 36.3–47.1)
HEMOGLOBIN: 8.5 G/DL (ref 11.9–15.1)
MCH RBC QN AUTO: 26.3 PG (ref 25.2–33.5)
MCHC RBC AUTO-ENTMCNC: 28.8 G/DL (ref 28.4–34.8)
MCV RBC AUTO: 91.3 FL (ref 82.6–102.9)
NRBC AUTOMATED: 0 PER 100 WBC
PDW BLD-RTO: 16.3 % (ref 11.8–14.4)
PLATELET # BLD: 256 K/UL (ref 138–453)
PMV BLD AUTO: 9.9 FL (ref 8.1–13.5)
POTASSIUM SERPL-SCNC: 4.1 MMOL/L (ref 3.7–5.3)
PRO-BNP: 125 PG/ML
RBC # BLD: 3.23 M/UL (ref 3.95–5.11)
SODIUM BLD-SCNC: 137 MMOL/L (ref 135–144)
WBC # BLD: 4 K/UL (ref 3.5–11.3)

## 2021-01-18 PROCEDURE — G8420 CALC BMI NORM PARAMETERS: HCPCS | Performed by: INTERNAL MEDICINE

## 2021-01-18 PROCEDURE — 1123F ACP DISCUSS/DSCN MKR DOCD: CPT | Performed by: INTERNAL MEDICINE

## 2021-01-18 PROCEDURE — G8926 SPIRO NO PERF OR DOC: HCPCS | Performed by: INTERNAL MEDICINE

## 2021-01-18 PROCEDURE — 99211 OFF/OP EST MAY X REQ PHY/QHP: CPT | Performed by: INTERNAL MEDICINE

## 2021-01-18 PROCEDURE — 3017F COLORECTAL CA SCREEN DOC REV: CPT | Performed by: INTERNAL MEDICINE

## 2021-01-18 PROCEDURE — 1036F TOBACCO NON-USER: CPT | Performed by: INTERNAL MEDICINE

## 2021-01-18 PROCEDURE — 3023F SPIROM DOC REV: CPT | Performed by: INTERNAL MEDICINE

## 2021-01-18 PROCEDURE — 99214 OFFICE O/P EST MOD 30 MIN: CPT | Performed by: INTERNAL MEDICINE

## 2021-01-18 PROCEDURE — G8484 FLU IMMUNIZE NO ADMIN: HCPCS | Performed by: INTERNAL MEDICINE

## 2021-01-18 PROCEDURE — 85027 COMPLETE CBC AUTOMATED: CPT

## 2021-01-18 PROCEDURE — 83880 ASSAY OF NATRIURETIC PEPTIDE: CPT

## 2021-01-18 PROCEDURE — 93010 ELECTROCARDIOGRAM REPORT: CPT | Performed by: INTERNAL MEDICINE

## 2021-01-18 PROCEDURE — 4040F PNEUMOC VAC/ADMIN/RCVD: CPT | Performed by: INTERNAL MEDICINE

## 2021-01-18 PROCEDURE — 1111F DSCHRG MED/CURRENT MED MERGE: CPT | Performed by: INTERNAL MEDICINE

## 2021-01-18 PROCEDURE — 36415 COLL VENOUS BLD VENIPUNCTURE: CPT

## 2021-01-18 PROCEDURE — 80048 BASIC METABOLIC PNL TOTAL CA: CPT

## 2021-01-18 PROCEDURE — 93005 ELECTROCARDIOGRAM TRACING: CPT | Performed by: INTERNAL MEDICINE

## 2021-01-18 PROCEDURE — G8400 PT W/DXA NO RESULTS DOC: HCPCS | Performed by: INTERNAL MEDICINE

## 2021-01-18 PROCEDURE — 1090F PRES/ABSN URINE INCON ASSESS: CPT | Performed by: INTERNAL MEDICINE

## 2021-01-18 PROCEDURE — G8427 DOCREV CUR MEDS BY ELIG CLIN: HCPCS | Performed by: INTERNAL MEDICINE

## 2021-01-18 NOTE — PROGRESS NOTES
Kisha Guerra am scribing for and in the presence of Dorothea Ta MD.    Patient: Abby Monzon  : 1945  Date of Visit: 2021    History of Present Illness:        Dear Rhett Storey MD    REASON FOR VISIT / CONSULTATION:   Chief Complaint   Patient presents with    Follow-up     Hx:CHF,New Onset AFib,Pneumonia,Resp Failure. Decrease Lasix - Echo on 20. IP on  -  for Anemia/Pneumonia. She has been doing pretty good. CP (last night - lasted 5 minutes)  Some SOB & Lightheaded/dizziness. Denies: Palpitations. I had the pleasure of seeing Abby Monzon in my office today. Ms. Joseph Aldana is a 77 y.o. female who presented for evaluation and to establish care. She was admitted to [facility] from 10/22/2020 to 2020 because of severe sepsis/septic shock secondary to bilateral pneumonia. She was treated with Rocephin and azithromycin. Hospital course complicated by acute respiratory failure requiring intubation. Patient also found to have severe left ventricular systolic dysfunction with estimated ejection fraction of 30%. She underwent cardiac catheterization that showed no significant CAD. Patient treated for nonischemic cardiomyopathy and was discharged with LifeVest.  She presented today for follow-up. Other medical problems include COPD, depression, gastroesophageal reflux disease and osteoporosis. During that same hospital stay, she developed atrial fibrillation with rapid ventricular response on 10/23/2020. Holly Deutsch was treated with Lopressor, amiodarone.      Cardiac Catheterization done on 10/22/2020 showed mild CAD. Echocardiogram done on 2020 showed definity echo contrast was used to better assess left ventricular wall motion and thickness. Mild global hypokinesis with no segmental abnormalities. No evidence of apical thrombus noted on Definity contrast imaging.  Compared to the previous study of 2019, Ejection fraction has improved from 30 to about 50%    EKG done today in office on 1/18/2021: Maintaining sinus rhythm. Ms. Devon Blanca is here for a 6 week follow up. She reports she has been doing okay but did have some chest pain just last night that lasted for a few minutes. She reports it comes and goes. She describing more or less musculoskeletal pain. Sharp pain in the left side of the chest without radiation. No relation to exertion. No sweating or diaphoresis. She continues to complain of shortness of breath and is using oxygen all the time. She does have lightheaded/dizziness that occurs at times. She sleeps okay at night. She does take a sleeping pill. No history to suggest orthopnea or PND. She has mild bilateral lower extremity swelling. I told her she should wear compression stockings. She denies any fever or cough. No nausea, vomiting or abdominal pain. No problems with her current medications. She is tolerating Xarelto 20 mg without bleeding complications. PAST MEDICAL HISTORY:        Past Medical History:   Diagnosis Date    COPD (chronic obstructive pulmonary disease) ([address]     Depression     GERD (gastroesophageal reflux disease)     Osteoporosis     Pneumonia    Nonischemic cardiomyopathy. History of atrial fibrillation. Chronic anticoagulation. Acute on chronic hypoxemic/hypercarbic respiratory failure. CURRENT ALLERGIES: Patient has no known allergies. REVIEW OF SYSTEMS: 14 systems were reviewed. Pertinent positives and negatives as above, all else negative.      Past Surgical History:   Procedure Laterality Date    BACK SURGERY      BREAST SURGERY      CARPAL TUNNEL RELEASE      FRACTURE SURGERY Left 12/20/2018    ORIF wrist    HYSTERECTOMY      JOINT REPLACEMENT      right knee    JOINT REPLACEMENT      WRIST FRACTURE SURGERY Left 12/20/2018    WRIST OPEN REDUCTION INTERNAL FIXATION-DISTAL RADIUS performed by Isai Nelson MD at [address] History:  Social History     Tobacco Use    Smoking status: Former Smoker     Packs/day: 1.00     Years: 10.00     Pack years: 10.00     Quit date: 1976     Years since quittin.2    Smokeless tobacco: Never Used   Substance Use Topics    Alcohol use: No    Drug use: No        CURRENT MEDICATIONS:        Outpatient Medications Marked as Taking for the 21 encounter (Office Visit) with Joel Knight MD   Medication Sig Dispense Refill    rivaroxaban (Gerry Deidra) 20 MG TABS tablet Take 1 tablet by mouth daily (with breakfast) 90 tablet 3    ascorbic acid (VITAMIN C) 1000 MG tablet Take 1 tablet by mouth 2 times daily for 7 days 14 tablet 0    Vitamin D (CHOLECALCIFEROL) 50 MCG (2000 UT) TABS tablet Take 1 tablet by mouth daily for 7 days 7 tablet 0    zinc sulfate (ZINCATE) 220 (50 Zn) MG capsule Take 2 capsules by mouth daily for 7 days 14 capsule 0    HYDROcodone-acetaminophen (NORCO)  MG per tablet Take 1 tablet by mouth every 6 hours as needed for Pain.  furosemide (LASIX) 20 MG tablet Take 1 tablet by mouth daily 60 tablet 3    pregabalin (LYRICA) 300 MG capsule Take 1 capsule by mouth 2 times daily for 30 days.  60 capsule 0    metoprolol tartrate (LOPRESSOR) 25 MG tablet Take 1 tablet by mouth 2 times daily 60 tablet 3    midodrine (PROAMATINE) 10 MG tablet Take 1 tablet by mouth 3 times daily (with meals) 90 tablet 3    Hydroxychloroquine Sulfate (PLAQUENIL PO) Take by mouth      albuterol sulfate  (90 Base) MCG/ACT inhaler Inhale 2 puffs into the lungs 4 times daily 1 Inhaler 0    levothyroxine (SYNTHROID) 150 MCG tablet Take 150 mcg by mouth Daily      aspirin 81 MG tablet Take 81 mg by mouth daily      DULoxetine (CYMBALTA) 60 MG extended release capsule Take 60 mg by mouth daily      traZODone (DESYREL) 100 MG tablet Take 200 mg by mouth nightly       pantoprazole sodium (PROTONIX) 40 MG PACK packet Take 40 mg by mouth 2 times daily (before meals)      potassium chloride (KLOR-CON) 20 MEQ packet Take 20 mEq by mouth 2 times daily      calcium citrate-vitamin D (CITRICAL + D) 315-250 MG-UNIT TABS per tablet Take 1 tablet by mouth 2 times daily (with meals)      alendronate (FOSAMAX) 70 MG tablet Take 70 mg by mouth every 7 days         FAMILY HISTORY: family history includes Heart Attack in her sister; Heart Attack (age of onset: 61) in her father; Heart Disease in her brother. PHYSICAL EXAMINATION:     /68 (Site: Left Upper Arm, Position: Sitting, Cuff Size: Medium Adult)   Pulse 88   Resp 18   Ht 5' 4\" (1.626 m)   Wt 142 lb 6.4 oz (64.6 kg)   SpO2 94%   BMI 24.44 kg/m²  Body mass index is 24.44 kg/m². Constitutional: She is oriented to person, place, and time. She appears well-developed and well-nourished. In no acute distress. HEENT: Normocephalic and atraumatic. No JVD present. Carotid bruit is not present. No mass and no thyromegaly present. No lymphadenopathy present. Cardiovascular: Normal rate, regular rhythm, normal heart sounds. Exam reveals no gallop and no friction rubs. No murmur was heard. .  Pulmonary/Chest: Effort normal and breath sounds normal. No respiratory distress. She has no wheezes, rhonchi or rales. Abdominal: Soft, non-tender. Bowel sounds and aorta are normal. She exhibits no organomegaly, mass or bruit. Extremities: Mild bilateral ankle edema. No cyanosis or clubbing. 2+ radial and carotid pulses. Distal extremity pulses: 2+ bilaterally. Neurological: She is alert and oriented to person, place, and time. No evidence of gross cranial nerve deficit. Coordination appeared normal.   Skin: Skin is warm and dry. There is no rash or diaphoresis. Psychiatric: She has a normal mood and affect.  Her speech is normal and behavior is normal.      MOST RECENT LABS ON RECORD:   Lab Results   Component Value Date    WBC 7.1 12/29/2020    HGB 8.5 (L) 12/29/2020    HCT 28.6 (L) 12/29/2020     12/29/2020    TRIG 153 (H) 10/24/2020    ALT 10 12/29/2020    AST 14 12/29/2020     12/29/2020    K 4.5 12/29/2020     12/29/2020    CREATININE 0.45 (L) 12/29/2020    BUN 16 12/29/2020    CO2 29 12/29/2020    TSH 0.69 10/24/2020    INR 1.4 12/29/2020       ASSESSMENT:     Patient Active Problem List    Diagnosis Date Noted    Acute metabolic encephalopathy 65/85/7664     Priority: High     Class: Acute    Pneumonia due to COVID-19 virus 12/26/2020    Sepsis with acute hypoxic respiratory failure and septic shock (HCC)     Biventricular congestive heart failure (HCC)     Encephalopathy     Acute respiratory failure with hypoxia and hypercapnia (HCC)     Severe sepsis ([address] 10/22/2020    COPD exacerbation ([address] 10/22/2020    Sepsis due to pneumonia ([address] 08/16/2020    Lactic acidosis 08/16/2020    Septic shock ([address] 08/16/2020    Status post surgery 12/20/2018    Pneumonia of both lungs due to infectious organism 10/05/2018    Hypothyroidism 10/05/2018    Major depressive disorder 10/05/2018    Chronic midline low back pain without sciatica 10/05/2018    Iron deficiency anemia 10/05/2018      Diagnosis Orders   1. Chronic combined systolic and diastolic congestive heart failure (HCC)  CBC    Basic Metabolic Panel    Brain Natriuretic Peptide    EKG 12 lead   2. History of atrial fibrillation     3. SOB (shortness of breath)     4. Chronic obstructive pulmonary disease, unspecified COPD type ([address]     5. Respiratory failure with hypoxia and hypercapnia, unspecified chronicity (HCC)           PLAN:         Chronic combined heart failure:    Beta Blocker: Continue Metoprolol tartrate (Lopressor) 25 mg bid.  ACE Inibitor/ARB: I will hold off ACE inhibitor for now because her ejection fraction is preserved. Patient is also recovering from acute on chronic kidney injury.  Diuretics: Continue furosemide (Lasix) 20 mg every morning.    Heart failure counseling: I told them to start wearing lower extremity compression stockings and I advised them to try and keep their legs up whenever possible and to limit salt in their diet.  Follow-up BNP, CBC and basic metabolic panel. History of Atrial Fibrillation:  10/23/2020 with RVR. This happened in the setting of sepsis. She is currently maintaining sinus rhythm. Beta Blocker: Continue metoprolol tartrate (Lopressor) 25 mg twice daily. Stroke Risk: Her CHADS2-VASc score is greater than 2 (2.2% stroke risk)  Anticoagulation: Continue Riveroxiban (Xarelto) 20 mg daily. I also reminded her to watch for signs of blood in her stool or black tarry stools and stop the medication immediately if this develops as this could be life threatening. · Shortness of Breath/COPD and chronic hypoxemic respiratory failure  · Continue oxygen therapy  · Follow with Pulmonology         Finally, I recommended that she continue her other medications and follow up with you as previously scheduled. FOLLOW UP:   I told Ms. South to call my office if she had any problems, but otherwise I asked her to Return in about 3 months (around 4/18/2021). However, I would be happy to see her sooner should the need arise. Sincerely,  Wilfredo Jacinto MD, F.A.C.C. [facility] Cardiology Specialist    [address] [facility], [address] [place] [address]  Phone: [phone], Fax: [phone]     I believe that the risk of significant morbidity and mortality related to the patient's current medical conditions are: intermediate-high. >45 minutes were spent during prep work, discussion and exam of the patient, and follow up documentation and all of their questions were answered. The documentation recorded by the scribe, accurately and completely reflects the services I personally performed and the decisions made by me. Wilfredo Jacinto MD, F.A.C.C.  January 18, 2021

## 2021-01-19 ENCOUNTER — TELEPHONE (OUTPATIENT)
Dept: CARDIOLOGY | Age: 76
End: 2021-01-19

## 2021-01-19 DIAGNOSIS — Z86.79 HISTORY OF ATRIAL FIBRILLATION: Primary | ICD-10-CM

## 2021-01-19 NOTE — TELEPHONE ENCOUNTER
----- Message from Saranya Mccord MD sent at 1/18/2021  6:26 PM EST -----  Blood work is good except for the low hemoglobin that stayed the same since last visit. Please follow-up with Krystin Rubi MD for the anemia.   Thank you

## 2021-02-04 ENCOUNTER — CARE COORDINATION (OUTPATIENT)
Dept: CASE MANAGEMENT | Age: 76
End: 2021-02-04

## 2021-02-04 NOTE — CARE COORDINATION
[facility] Transitions Follow Up Call    2021    Patient: Barron Barrera  Patient : 1945   MRN: <[id number]>  Reason for Admission:   Discharge Date: 20 RARS: Readmission Risk Score: 32         Spoke with: Patient, [address] [place] [address] Transitions Subsequent and Final Call    Subsequent and Final Calls  Do you have any ongoing symptoms?: Yes  Patient-reported symptoms: Shortness of Breath  -patient reports shortness of breath with exertion; utilizes oxygen at 3 [address], SpO2 95%  Do you have any questions related to your medications?: No  Are you currently active with any services?: Home Health  -services completed  Do you have any needs or concerns that I can assist you with?: No  Identified Barriers: None  Care Transitions Interventions  No Identified Needs      Patient is pleasant in conversation and voices no needs or concerns at this time.   -denies fever/chills, cough, or chest discomfort   -reports good appetite with normal bladder/bowel elimination   -reports good appetite   -taking prescribed medications as ordered     Final call; no further outreach.      Follow Up  Future Appointments   Date Time Provider Marietta Leon   2021 10:40 AM Janessa Wright MD TIFF CARD [facility]       Rebekah Miranda, RN

## 2021-03-15 ENCOUNTER — TELEPHONE (OUTPATIENT)
Dept: CARDIOLOGY | Age: 76
End: 2021-03-15

## 2021-03-15 NOTE — TELEPHONE ENCOUNTER
wanted to know if patient could stop taking Xarelto. Didn't know if she needed it anymore since she was doing better.

## 2021-03-16 NOTE — TELEPHONE ENCOUNTER
She should continue Xarelto until we see her back in April. We will discuss this on follow-up. If they are concerned about the cost of the medicine we can give him samples. She may end up needing blood thinner indefinitely.   Thank you

## 2021-03-17 DIAGNOSIS — I50.82 BIVENTRICULAR CONGESTIVE HEART FAILURE (HCC): Primary | ICD-10-CM

## 2021-04-22 ENCOUNTER — HOSPITAL ENCOUNTER (OUTPATIENT)
Age: 76
Discharge: HOME | End: 2021-04-22
Payer: MEDICARE

## 2021-04-22 ENCOUNTER — OFFICE VISIT (OUTPATIENT)
Dept: CARDIOLOGY | Age: 76
End: 2021-04-22
Payer: MEDICARE

## 2021-04-22 VITALS
SYSTOLIC BLOOD PRESSURE: 130 MMHG | DIASTOLIC BLOOD PRESSURE: 69 MMHG | WEIGHT: 149.2 LBS | BODY MASS INDEX: 25.47 KG/M2 | OXYGEN SATURATION: 93 % | HEART RATE: 74 BPM | RESPIRATION RATE: 17 BRPM | HEIGHT: 64 IN

## 2021-04-22 DIAGNOSIS — I50.42 CHRONIC COMBINED SYSTOLIC AND DIASTOLIC CONGESTIVE HEART FAILURE (HCC): ICD-10-CM

## 2021-04-22 DIAGNOSIS — J44.9 CHRONIC OBSTRUCTIVE PULMONARY DISEASE, UNSPECIFIED COPD TYPE (HCC): ICD-10-CM

## 2021-04-22 DIAGNOSIS — I50.42 CHRONIC COMBINED SYSTOLIC AND DIASTOLIC CONGESTIVE HEART FAILURE (HCC): Primary | ICD-10-CM

## 2021-04-22 DIAGNOSIS — I50.82 BIVENTRICULAR CONGESTIVE HEART FAILURE (HCC): ICD-10-CM

## 2021-04-22 DIAGNOSIS — R06.02 SOB (SHORTNESS OF BREATH): ICD-10-CM

## 2021-04-22 DIAGNOSIS — Z86.79 HISTORY OF ATRIAL FIBRILLATION: ICD-10-CM

## 2021-04-22 LAB
ANION GAP SERPL CALCULATED.3IONS-SCNC: 12 MMOL/L (ref 9–17)
BNP INTERPRETATION: NORMAL
BUN BLDV-MCNC: 19 MG/DL (ref 8–23)
BUN/CREAT BLD: 24 (ref 9–20)
CALCIUM SERPL-MCNC: 10.1 MG/DL (ref 8.6–10.4)
CHLORIDE BLD-SCNC: 102 MMOL/L (ref 98–107)
CO2: 25 MMOL/L (ref 20–31)
CREAT SERPL-MCNC: 0.79 MG/DL (ref 0.5–0.9)
GFR AFRICAN AMERICAN: >60 ML/MIN
GFR NON-AFRICAN AMERICAN: >60 ML/MIN
GFR SERPL CREATININE-BSD FRML MDRD: ABNORMAL ML/MIN/{1.73_M2}
GFR SERPL CREATININE-BSD FRML MDRD: ABNORMAL ML/MIN/{1.73_M2}
GLUCOSE BLD-MCNC: 139 MG/DL (ref 70–99)
HCT VFR BLD CALC: 30.9 % (ref 36.3–47.1)
HEMOGLOBIN: 9 G/DL (ref 11.9–15.1)
MCH RBC QN AUTO: 23.3 PG (ref 25.2–33.5)
MCHC RBC AUTO-ENTMCNC: 29.1 G/DL (ref 28.4–34.8)
MCV RBC AUTO: 80.1 FL (ref 82.6–102.9)
NRBC AUTOMATED: 0 PER 100 WBC
PDW BLD-RTO: 17.8 % (ref 11.8–14.4)
PLATELET # BLD: 217 K/UL (ref 138–453)
PMV BLD AUTO: 10 FL (ref 8.1–13.5)
POTASSIUM SERPL-SCNC: 4.1 MMOL/L (ref 3.7–5.3)
PRO-BNP: 256 PG/ML
RBC # BLD: 3.86 M/UL (ref 3.95–5.11)
SODIUM BLD-SCNC: 139 MMOL/L (ref 135–144)
WBC # BLD: 4.6 K/UL (ref 3.5–11.3)

## 2021-04-22 PROCEDURE — 1123F ACP DISCUSS/DSCN MKR DOCD: CPT | Performed by: INTERNAL MEDICINE

## 2021-04-22 PROCEDURE — 1090F PRES/ABSN URINE INCON ASSESS: CPT | Performed by: INTERNAL MEDICINE

## 2021-04-22 PROCEDURE — G8926 SPIRO NO PERF OR DOC: HCPCS | Performed by: INTERNAL MEDICINE

## 2021-04-22 PROCEDURE — 99211 OFF/OP EST MAY X REQ PHY/QHP: CPT | Performed by: INTERNAL MEDICINE

## 2021-04-22 PROCEDURE — 3023F SPIROM DOC REV: CPT | Performed by: INTERNAL MEDICINE

## 2021-04-22 PROCEDURE — G8427 DOCREV CUR MEDS BY ELIG CLIN: HCPCS | Performed by: INTERNAL MEDICINE

## 2021-04-22 PROCEDURE — G8400 PT W/DXA NO RESULTS DOC: HCPCS | Performed by: INTERNAL MEDICINE

## 2021-04-22 PROCEDURE — 99214 OFFICE O/P EST MOD 30 MIN: CPT | Performed by: INTERNAL MEDICINE

## 2021-04-22 PROCEDURE — 80048 BASIC METABOLIC PNL TOTAL CA: CPT

## 2021-04-22 PROCEDURE — 1036F TOBACCO NON-USER: CPT | Performed by: INTERNAL MEDICINE

## 2021-04-22 PROCEDURE — 4040F PNEUMOC VAC/ADMIN/RCVD: CPT | Performed by: INTERNAL MEDICINE

## 2021-04-22 PROCEDURE — 83880 ASSAY OF NATRIURETIC PEPTIDE: CPT

## 2021-04-22 PROCEDURE — 3017F COLORECTAL CA SCREEN DOC REV: CPT | Performed by: INTERNAL MEDICINE

## 2021-04-22 PROCEDURE — G8417 CALC BMI ABV UP PARAM F/U: HCPCS | Performed by: INTERNAL MEDICINE

## 2021-04-22 PROCEDURE — 85027 COMPLETE CBC AUTOMATED: CPT

## 2021-04-22 PROCEDURE — 36415 COLL VENOUS BLD VENIPUNCTURE: CPT

## 2021-04-22 NOTE — PROGRESS NOTES
Pa Boothe am scribing for and in the presence of Cheryl Hernandez MD.    Patient: [place]  : 1945  Date of Visit: 2021    History of Present Illness:        Dear Rosa Viramontes MD    REASON FOR VISIT / CONSULTATION:   Chief Complaint   Patient presents with    3 Month Follow-Up     Hx:CHF,AFib,SOB,COPD,Resp Failure. She has been doing fine. A little SOB. Denies: CP, Lightheaded/dizziness, Palpitations. I had the pleasure of seeing [place] in my office today. Ms. Mary Velasquez is a 77 y.o. female who presented for evaluation and to establish care. She was admitted to [facility] from 10/22/2020 to 2020 because of severe sepsis/septic shock secondary to bilateral pneumonia. She was treated with Rocephin and azithromycin. Hospital course complicated by acute respiratory failure requiring intubation. Patient also found to have severe left ventricular systolic dysfunction with estimated ejection fraction of 30%. She underwent cardiac catheterization that showed no significant CAD. Patient treated for nonischemic cardiomyopathy and was discharged with LifeVest.  Repeat echo in 2021 showed ejection fraction 50%. LifeVest discontinued. Other medical problems include COPD, depression, gastroesophageal reflux disease and osteoporosis. During that same hospital stay, she developed atrial fibrillation with rapid ventricular response on 10/23/2020. Stefan Eason was treated with Lopressor, amiodarone in addition to anticoagulation with Xarelto. She was also discharged with home oxygen therapy. Cardiac Catheterization done on 10/22/2020 showed mild CAD. Echocardiogram done on 2020 showed definity echo contrast was used to better assess left ventricular wall motion and thickness. Mild global hypokinesis with no segmental abnormalities. No evidence of apical thrombus noted on Definity contrast imaging.  Compared to the previous study of 6/24/2019, Ejection fraction has improved from 30 to about 50%    EKG done in office on 1/18/2021: Maintaining sinus rhythm. Ms. Michelle Fitch is here for a 3 month follow up. She reports she has been doing fine cardiac wise. She does have a little shortness of breath at times but is not using oxygen anymore. She checks she oxygen at home and it always runs in the 90's. She sleeps good at night and continues to take sleeping pills to help with this. She has noticed some swelling in her legs and ankles at times. She continues to do all of her household chores with no issues. She is complaining of easy bruising but no spontaneous bleeding. She reported history of chronic anemia. She denies having any chest pain, pressure or tightness. She denies having any lightheaded/dizziness or palpitations. She denies having any abdominal pain, nausea or vomiting. No cough, fever or chills. No bleeding problems, bowel issues, problems with medications or any other concerns at this time. She reports no changes in weight but reports she just got fatter. PAST MEDICAL HISTORY:        Past Medical History:   Diagnosis Date    COPD (chronic obstructive pulmonary disease) ([facility] [address]     Depression     GERD (gastroesophageal reflux disease)     Osteoporosis     Pneumonia    Nonischemic cardiomyopathy. History of atrial fibrillation. Chronic anticoagulation. Acute on chronic hypoxemic/hypercarbic respiratory failure. CURRENT ALLERGIES: Patient has no known allergies. REVIEW OF SYSTEMS: 14 systems were reviewed. Pertinent positives and negatives as above, all else negative.      Past Surgical History:   Procedure Laterality Date    BACK SURGERY      BREAST SURGERY      CARPAL TUNNEL RELEASE      FRACTURE SURGERY Left 12/20/2018    ORIF wrist    HYSTERECTOMY      JOINT REPLACEMENT      right knee    JOINT REPLACEMENT      WRIST FRACTURE SURGERY Left 12/20/2018    WRIST OPEN REDUCTION INTERNAL Rondi Mcburney RADIUS performed by Hipolito Conley MD at [address] History:  Social History     Tobacco Use    Smoking status: Former Smoker     Packs/day: 1.00     Years: 10.00     Pack years: 10.00     Quit date: 1976     Years since quittin.4    Smokeless tobacco: Never Used   Substance Use Topics    Alcohol use: No    Drug use: No        CURRENT MEDICATIONS:        Outpatient Medications Marked as Taking for the 21 encounter (Office Visit) with Tamir Rodriguez MD   Medication Sig Dispense Refill    rivaroxaban (XARELTO) 20 MG TABS tablet Take 1 tablet by mouth daily (with breakfast) 30 tablet 0    ascorbic acid (VITAMIN C) 1000 MG tablet Take 1 tablet by mouth 2 times daily for 7 days 14 tablet 0    Vitamin D (CHOLECALCIFEROL) 50 MCG (2000 UT) TABS tablet Take 1 tablet by mouth daily for 7 days 7 tablet 0    HYDROcodone-acetaminophen (NORCO)  MG per tablet Take 1 tablet by mouth every 6 hours as needed for Pain.  furosemide (LASIX) 20 MG tablet Take 1 tablet by mouth daily 60 tablet 3    pregabalin (LYRICA) 300 MG capsule Take 1 capsule by mouth 2 times daily for 30 days.  60 capsule 0    metoprolol tartrate (LOPRESSOR) 25 MG tablet Take 1 tablet by mouth 2 times daily 60 tablet 3    midodrine (PROAMATINE) 10 MG tablet Take 1 tablet by mouth 3 times daily (with meals) 90 tablet 3    tamsulosin (FLOMAX) 0.4 MG capsule Take 1 capsule by mouth daily 30 capsule 3    Hydroxychloroquine Sulfate (PLAQUENIL PO) Take by mouth daily       albuterol sulfate  (90 Base) MCG/ACT inhaler Inhale 2 puffs into the lungs 4 times daily 1 Inhaler 0    levothyroxine (SYNTHROID) 150 MCG tablet Take 150 mcg by mouth Daily      aspirin 81 MG tablet Take 81 mg by mouth daily      DULoxetine (CYMBALTA) 60 MG extended release capsule Take 60 mg by mouth daily      traZODone (DESYREL) 100 MG tablet Take 200 mg by mouth nightly       pantoprazole sodium (PROTONIX) 40 MG PACK packet Take 40 mg by mouth 2 times daily (before meals)      potassium chloride (KLOR-CON) 20 MEQ packet Take 20 mEq by mouth 2 times daily      calcium citrate-vitamin D (CITRICAL + D) 315-250 MG-UNIT TABS per tablet Take 1 tablet by mouth 2 times daily (with meals)      alendronate (FOSAMAX) 70 MG tablet Take 70 mg by mouth every 7 days         FAMILY HISTORY: family history includes Heart Attack in her sister; Heart Attack (age of onset: 61) in her father; Heart Disease in her brother. PHYSICAL EXAMINATION:     /69 (Site: Left Upper Arm, Position: Sitting, Cuff Size: Medium Adult)   Pulse 74   Resp 17   Ht 5' 4\" (1.626 m)   Wt 149 lb 3.2 oz (67.7 kg)   SpO2 93%   BMI 25.61 kg/m²  Body mass index is 25.61 kg/m². Constitutional: She is oriented to person, place, and time. She appears well-developed and well-nourished. In no acute distress. HEENT: Normocephalic and atraumatic. No JVD present. Carotid bruit is not present. No mass and no thyromegaly present. No lymphadenopathy present. Cardiovascular: Normal rate, regular rhythm, normal heart sounds. Exam reveals no gallop and no friction rubs. 2/6 systolic murmur, 4th intercostal space on the LEFT must lateral to the sternal border. Pulmonary/Chest: Severe kyphosis with poor air entry bilaterally. No significant wheezes or crackles. Scar of previous neck surgery noted. Abdominal: Soft, non-tender. Bowel sounds and aorta are normal. She exhibits no organomegaly, mass or bruit. Extremities: No significant edema. No cyanosis or clubbing. 2+ radial and carotid pulses. Distal extremity pulses: 2+ bilaterally. Neurological: She is alert and oriented to person, place, and time. No evidence of gross cranial nerve deficit. Coordination appeared normal.   Skin: Skin is warm and dry. There is no rash or diaphoresis. Psychiatric: She has a normal mood and affect.  Her speech is normal and behavior is normal.      MOST RECENT LABS ON RECORD:   Lab Results   Component stockings and I advised them to try and keep their legs up whenever possible and to limit salt in their diet.  Additional Testing List: Additional Testing List: I took the liberty of ordering a BMP for today to assess their potassium and renal function. I told them that they could get their lab work performed at the location of their choosing, unfortunately, if the lab work was not performed at a [facility]) facility I could not guarantee my ability to follow up with them on their results. ,  I took the liberty of ordering a CBC. I told them that they could get their lab work performed at the location of their choosing, unfortunately, if the lab work was not performed at a [facility]) facility I could not guarantee my ability to follow up with them on their results. and I ordered a pro BNP level to better assess for the patients level of heart failure. I told them that they could get their lab work performed at the location of their choosing, unfortunately, if the lab work was not performed at a [facility] facility I could not guarantee my ability to follow up with them on their results. History of Atrial Fibrillation: 10/23/2020 with RVR. This happened in the setting of sepsis. She is currently maintaining sinus rhythm. Beta Blocker: Continue Metoprolol tartrate (Lopressor) 25 mg bid. Stroke Risk: Her CHADS2-VASc score is greater than 2 (2.2% stroke risk)  Anticoagulation: Continue Riveroxiban (Xarelto) 20 mg daily. I also reminded her to watch for signs of blood in her stool or black tarry stools and stop the medication immediately if this develops as this could be life threatening. · Shortness of Breath/COPD:  · Chronic hypoxemic respiratory failure. · She does not use oxygen anymore but reports her oxygen is always reading in the 90's at home   · Follow with Pulmonology      Atherosclerotic Heart Disease: Mild nonobstructive CAD on prior cardiac cath.    Continue aspirin, metoprolol   She has no angina. No history of dyslipidemia. She has never been on a statin therapy.  I do not see a long-term benefit of using statins in her condition giving her nonobstructive CAD. Finally, I recommended that she continue her other medications and follow up with you as previously scheduled. FOLLOW UP:   I told Ms. South to call my office if she had any problems, but otherwise I asked her to Return in about 6 months (around 10/22/2021). However, I would be happy to see her sooner should the need arise. Sincerely,  Ana Paula Gasca MD, F.A.C.C. [facility] Cardiology Specialist    [address] Sandeep Gregorio Angelicgilberto Fowler Christal [facility], [address] [place] [address]  Phone: [phone], Fax: [phone]     I believe that the risk of significant morbidity and mortality related to the patient's current medical conditions are: intermediate-high. >30 minutes were spent during prep work, discussion and exam of the patient, and follow up documentation and all of their questions were answered. The documentation recorded by the scribe, accurately and completely reflects the services I personally performed and the decisions made by me. Ana Paula Gasca MD, F.A.C.C.  April 22, 2021

## 2021-04-22 NOTE — PATIENT INSTRUCTIONS
SURVEY:    You may be receiving a survey from [employer] regarding your visit today. Please complete the survey to enable us to provide the highest quality of care to you and your family. If you cannot score us a very good on any question, please call the office to discuss how we could have made your experience a very good one. Thank you.

## 2021-04-23 ENCOUNTER — TELEPHONE (OUTPATIENT)
Dept: CARDIOLOGY | Age: 76
End: 2021-04-23

## 2021-04-23 NOTE — TELEPHONE ENCOUNTER
----- Message from Kyle Leonard MD sent at 4/22/2021  6:11 PM EDT -----  Blood work stable. Continue current therapy and follow-up. Please call with questions and/or concerns.   Thank you

## 2021-05-14 RX ORDER — FUROSEMIDE 20 MG/1
20 TABLET ORAL DAILY
Qty: 90 TABLET | Refills: 3 | Status: ON HOLD | OUTPATIENT
Start: 2021-05-14 | End: 2022-10-18 | Stop reason: HOSPADM

## 2021-05-20 ENCOUNTER — APPOINTMENT (OUTPATIENT)
Dept: GENERAL RADIOLOGY | Age: 76
DRG: 193 | End: 2021-05-20
Payer: MEDICARE

## 2021-05-20 ENCOUNTER — HOSPITAL ENCOUNTER (INPATIENT)
Age: 76
LOS: 1 days | Discharge: HOME | DRG: 193 | End: 2021-05-21
Attending: INTERNAL MEDICINE | Admitting: INTERNAL MEDICINE
Payer: MEDICARE

## 2021-05-20 ENCOUNTER — APPOINTMENT (OUTPATIENT)
Dept: CT IMAGING | Age: 76
DRG: 193 | End: 2021-05-20
Payer: MEDICARE

## 2021-05-20 DIAGNOSIS — R06.03 ACUTE RESPIRATORY DISTRESS: Primary | ICD-10-CM

## 2021-05-20 DIAGNOSIS — J18.9 COMMUNITY ACQUIRED PNEUMONIA OF RIGHT LUNG, UNSPECIFIED PART OF LUNG: ICD-10-CM

## 2021-05-20 PROBLEM — J15.9 COMMUNITY ACQUIRED BACTERIAL PNEUMONIA: Status: ACTIVE | Noted: 2021-05-20

## 2021-05-20 LAB
ABSOLUTE EOS #: 0.25 K/UL (ref 0–0.44)
ABSOLUTE IMMATURE GRANULOCYTE: <0.03 K/UL (ref 0–0.3)
ABSOLUTE LYMPH #: 1.77 K/UL (ref 1.1–3.7)
ABSOLUTE MONO #: 0.67 K/UL (ref 0.1–1.2)
ALBUMIN SERPL-MCNC: 4.3 G/DL (ref 3.5–5.2)
ALBUMIN/GLOBULIN RATIO: 1.9 (ref 1–2.5)
ALLEN TEST: ABNORMAL
ALP BLD-CCNC: 53 U/L (ref 35–104)
ALT SERPL-CCNC: 21 U/L (ref 5–33)
AMPHETAMINE SCREEN URINE: NEGATIVE
ANION GAP SERPL CALCULATED.3IONS-SCNC: 9 MMOL/L (ref 9–17)
AST SERPL-CCNC: 27 U/L
BARBITURATE SCREEN URINE: NEGATIVE
BASOPHILS # BLD: 0 % (ref 0–2)
BASOPHILS ABSOLUTE: <0.03 K/UL (ref 0–0.2)
BENZODIAZEPINE SCREEN, URINE: NEGATIVE
BILIRUB SERPL-MCNC: 0.19 MG/DL (ref 0.3–1.2)
BNP INTERPRETATION: NORMAL
BUN BLDV-MCNC: 20 MG/DL (ref 8–23)
BUN/CREAT BLD: 26 (ref 9–20)
BUPRENORPHINE URINE: NEGATIVE
CALCIUM SERPL-MCNC: 9.5 MG/DL (ref 8.6–10.4)
CANNABINOID SCREEN URINE: NEGATIVE
CARBOXYHEMOGLOBIN: ABNORMAL % (ref 0–5)
CHLORIDE BLD-SCNC: 100 MMOL/L (ref 98–107)
CO2: 29 MMOL/L (ref 20–31)
COCAINE METABOLITE, URINE: NEGATIVE
CREAT SERPL-MCNC: 0.78 MG/DL (ref 0.5–0.9)
DIFFERENTIAL TYPE: ABNORMAL
EOSINOPHILS RELATIVE PERCENT: 3 % (ref 1–4)
FIO2: ABNORMAL
GFR AFRICAN AMERICAN: >60 ML/MIN
GFR NON-AFRICAN AMERICAN: >60 ML/MIN
GFR SERPL CREATININE-BSD FRML MDRD: ABNORMAL ML/MIN/{1.73_M2}
GFR SERPL CREATININE-BSD FRML MDRD: ABNORMAL ML/MIN/{1.73_M2}
GLUCOSE BLD-MCNC: 114 MG/DL (ref 70–99)
HCO3 VENOUS: 32.7 MMOL/L (ref 24–30)
HCT VFR BLD CALC: 30.4 % (ref 36.3–47.1)
HEMOGLOBIN: 9 G/DL (ref 11.9–15.1)
IMMATURE GRANULOCYTES: 0 %
LACTIC ACID, WHOLE BLOOD: NORMAL MMOL/L (ref 0.7–2.1)
LACTIC ACID: 1.1 MMOL/L (ref 0.5–2.2)
LYMPHOCYTES # BLD: 24 % (ref 24–43)
MCH RBC QN AUTO: 23.3 PG (ref 25.2–33.5)
MCHC RBC AUTO-ENTMCNC: 29.6 G/DL (ref 28.4–34.8)
MCV RBC AUTO: 78.6 FL (ref 82.6–102.9)
MDMA URINE: ABNORMAL
METHADONE SCREEN, URINE: NEGATIVE
METHAMPHETAMINE, URINE: NEGATIVE
METHEMOGLOBIN: ABNORMAL % (ref 0–1.9)
MODE: ABNORMAL
MONOCYTES # BLD: 9 % (ref 3–12)
NEGATIVE BASE EXCESS, VEN: ABNORMAL MMOL/L (ref 0–2)
NOTIFICATION TIME: ABNORMAL
NOTIFICATION: ABNORMAL
NRBC AUTOMATED: 0 PER 100 WBC
O2 DEVICE/FLOW/%: ABNORMAL
O2 SAT, VEN: 79.9 % (ref 60–85)
OPIATES, URINE: POSITIVE
OXYCODONE SCREEN URINE: NEGATIVE
OXYHEMOGLOBIN: ABNORMAL % (ref 95–98)
PATIENT TEMP: 37
PCO2, VEN, TEMP ADJ: ABNORMAL MMHG (ref 39–55)
PCO2, VEN: 55 (ref 39–55)
PDW BLD-RTO: 18.7 % (ref 11.8–14.4)
PEEP/CPAP: ABNORMAL
PH VENOUS: 7.39 (ref 7.32–7.42)
PH, VEN, TEMP ADJ: ABNORMAL (ref 7.32–7.42)
PHENCYCLIDINE, URINE: NEGATIVE
PLATELET # BLD: 249 K/UL (ref 138–453)
PLATELET ESTIMATE: ABNORMAL
PMV BLD AUTO: 9.5 FL (ref 8.1–13.5)
PO2, VEN, TEMP ADJ: ABNORMAL MMHG (ref 30–50)
PO2, VEN: 45.1 (ref 30–50)
POSITIVE BASE EXCESS, VEN: 6.1 MMOL/L (ref 0–2)
POTASSIUM SERPL-SCNC: 3.4 MMOL/L (ref 3.7–5.3)
PRO-BNP: 47 PG/ML
PROPOXYPHENE, URINE: NEGATIVE
PSV: ABNORMAL
PT. POSITION: ABNORMAL
RBC # BLD: 3.87 M/UL (ref 3.95–5.11)
RBC # BLD: ABNORMAL 10*6/UL
RESPIRATORY RATE: ABNORMAL
SAMPLE SITE: ABNORMAL
SEG NEUTROPHILS: 64 % (ref 36–65)
SEGMENTED NEUTROPHILS ABSOLUTE COUNT: 4.62 K/UL (ref 1.5–8.1)
SET RATE: ABNORMAL
SODIUM BLD-SCNC: 138 MMOL/L (ref 135–144)
TEST INFORMATION: ABNORMAL
TEXT FOR RESPIRATORY: ABNORMAL
TOTAL HB: ABNORMAL G/DL (ref 12–16)
TOTAL PROTEIN: 6.6 G/DL (ref 6.4–8.3)
TOTAL RATE: ABNORMAL
TRICYCLIC ANTIDEPRESSANTS, UR: NEGATIVE
TROPONIN INTERP: ABNORMAL
TROPONIN INTERP: ABNORMAL
TROPONIN T: ABNORMAL NG/ML
TROPONIN T: ABNORMAL NG/ML
TROPONIN, HIGH SENSITIVITY: 16 NG/L (ref 0–14)
TROPONIN, HIGH SENSITIVITY: 23 NG/L (ref 0–14)
VT: ABNORMAL
WBC # BLD: 7.4 K/UL (ref 3.5–11.3)
WBC # BLD: ABNORMAL 10*3/UL

## 2021-05-20 PROCEDURE — 84484 ASSAY OF TROPONIN QUANT: CPT

## 2021-05-20 PROCEDURE — 6360000004 HC RX CONTRAST MEDICATION: Performed by: PHYSICIAN ASSISTANT

## 2021-05-20 PROCEDURE — 80306 DRUG TEST PRSMV INSTRMNT: CPT

## 2021-05-20 PROCEDURE — 1200000000 HC SEMI PRIVATE

## 2021-05-20 PROCEDURE — 6370000000 HC RX 637 (ALT 250 FOR IP): Performed by: PHYSICIAN ASSISTANT

## 2021-05-20 PROCEDURE — 87040 BLOOD CULTURE FOR BACTERIA: CPT

## 2021-05-20 PROCEDURE — 99285 EMERGENCY DEPT VISIT HI MDM: CPT

## 2021-05-20 PROCEDURE — 80053 COMPREHEN METABOLIC PANEL: CPT

## 2021-05-20 PROCEDURE — 2580000003 HC RX 258: Performed by: PHYSICIAN ASSISTANT

## 2021-05-20 PROCEDURE — 94664 DEMO&/EVAL PT USE INHALER: CPT

## 2021-05-20 PROCEDURE — 6360000002 HC RX W HCPCS: Performed by: PHYSICIAN ASSISTANT

## 2021-05-20 PROCEDURE — 71260 CT THORAX DX C+: CPT

## 2021-05-20 PROCEDURE — 82805 BLOOD GASES W/O2 SATURATION: CPT

## 2021-05-20 PROCEDURE — 93005 ELECTROCARDIOGRAM TRACING: CPT | Performed by: PHYSICIAN ASSISTANT

## 2021-05-20 PROCEDURE — 83605 ASSAY OF LACTIC ACID: CPT

## 2021-05-20 PROCEDURE — 83880 ASSAY OF NATRIURETIC PEPTIDE: CPT

## 2021-05-20 PROCEDURE — 94640 AIRWAY INHALATION TREATMENT: CPT

## 2021-05-20 PROCEDURE — 96374 THER/PROPH/DIAG INJ IV PUSH: CPT

## 2021-05-20 PROCEDURE — 36415 COLL VENOUS BLD VENIPUNCTURE: CPT

## 2021-05-20 PROCEDURE — 71045 X-RAY EXAM CHEST 1 VIEW: CPT

## 2021-05-20 PROCEDURE — 85025 COMPLETE CBC W/AUTO DIFF WBC: CPT

## 2021-05-20 RX ORDER — ALBUTEROL SULFATE 2.5 MG/3ML
2.5 SOLUTION RESPIRATORY (INHALATION)
Status: DISCONTINUED | OUTPATIENT
Start: 2021-05-20 | End: 2021-05-21

## 2021-05-20 RX ORDER — PREGABALIN 75 MG/1
300 CAPSULE ORAL 2 TIMES DAILY
Status: DISCONTINUED | OUTPATIENT
Start: 2021-05-21 | End: 2021-05-21 | Stop reason: HOSPADM

## 2021-05-20 RX ORDER — HYDROCODONE BITARTRATE AND ACETAMINOPHEN 10; 325 MG/1; MG/1
1 TABLET ORAL EVERY 6 HOURS PRN
Status: DISCONTINUED | OUTPATIENT
Start: 2021-05-20 | End: 2021-05-21 | Stop reason: HOSPADM

## 2021-05-20 RX ORDER — SODIUM CHLORIDE 9 MG/ML
25 INJECTION, SOLUTION INTRAVENOUS PRN
Status: DISCONTINUED | OUTPATIENT
Start: 2021-05-20 | End: 2021-05-21 | Stop reason: HOSPADM

## 2021-05-20 RX ORDER — LEVOTHYROXINE SODIUM 0.15 MG/1
150 TABLET ORAL DAILY
Status: DISCONTINUED | OUTPATIENT
Start: 2021-05-21 | End: 2021-05-21 | Stop reason: HOSPADM

## 2021-05-20 RX ORDER — ALENDRONATE SODIUM 70 MG/1
70 TABLET ORAL
Status: DISCONTINUED | OUTPATIENT
Start: 2021-05-21 | End: 2021-05-21 | Stop reason: CLARIF

## 2021-05-20 RX ORDER — METHYLPREDNISOLONE SODIUM SUCCINATE 125 MG/2ML
125 INJECTION, POWDER, LYOPHILIZED, FOR SOLUTION INTRAMUSCULAR; INTRAVENOUS ONCE
Status: COMPLETED | OUTPATIENT
Start: 2021-05-20 | End: 2021-05-20

## 2021-05-20 RX ORDER — POTASSIUM CHLORIDE 20 MEQ/1
20 TABLET, EXTENDED RELEASE ORAL 2 TIMES DAILY
Status: DISCONTINUED | OUTPATIENT
Start: 2021-05-21 | End: 2021-05-21 | Stop reason: HOSPADM

## 2021-05-20 RX ORDER — ASPIRIN 81 MG/1
81 TABLET ORAL DAILY
Status: DISCONTINUED | OUTPATIENT
Start: 2021-05-21 | End: 2021-05-21 | Stop reason: HOSPADM

## 2021-05-20 RX ORDER — IPRATROPIUM BROMIDE AND ALBUTEROL SULFATE 2.5; .5 MG/3ML; MG/3ML
SOLUTION RESPIRATORY (INHALATION)
Status: DISPENSED
Start: 2021-05-20 | End: 2021-05-21

## 2021-05-20 RX ORDER — BUDESONIDE 0.5 MG/2ML
0.5 INHALANT ORAL ONCE
Status: COMPLETED | OUTPATIENT
Start: 2021-05-20 | End: 2021-05-20

## 2021-05-20 RX ORDER — IPRATROPIUM BROMIDE AND ALBUTEROL SULFATE 2.5; .5 MG/3ML; MG/3ML
1 SOLUTION RESPIRATORY (INHALATION) ONCE
Status: COMPLETED | OUTPATIENT
Start: 2021-05-20 | End: 2021-05-20

## 2021-05-20 RX ORDER — SODIUM CHLORIDE 9 MG/ML
INJECTION, SOLUTION INTRAVENOUS CONTINUOUS
Status: DISCONTINUED | OUTPATIENT
Start: 2021-05-21 | End: 2021-05-21 | Stop reason: HOSPADM

## 2021-05-20 RX ORDER — POLYETHYLENE GLYCOL 3350 17 G/17G
17 POWDER, FOR SOLUTION ORAL DAILY PRN
Status: DISCONTINUED | OUTPATIENT
Start: 2021-05-20 | End: 2021-05-21 | Stop reason: HOSPADM

## 2021-05-20 RX ORDER — IPRATROPIUM BROMIDE AND ALBUTEROL SULFATE 2.5; .5 MG/3ML; MG/3ML
1 SOLUTION RESPIRATORY (INHALATION) EVERY 4 HOURS PRN
Status: DISCONTINUED | OUTPATIENT
Start: 2021-05-20 | End: 2021-05-21

## 2021-05-20 RX ORDER — ACETAMINOPHEN 650 MG/1
650 SUPPOSITORY RECTAL EVERY 6 HOURS PRN
Status: DISCONTINUED | OUTPATIENT
Start: 2021-05-20 | End: 2021-05-21 | Stop reason: HOSPADM

## 2021-05-20 RX ORDER — BUDESONIDE 0.5 MG/2ML
INHALANT ORAL
Status: DISPENSED
Start: 2021-05-20 | End: 2021-05-21

## 2021-05-20 RX ORDER — SODIUM CHLORIDE 0.9 % (FLUSH) 0.9 %
5-40 SYRINGE (ML) INJECTION EVERY 12 HOURS SCHEDULED
Status: DISCONTINUED | OUTPATIENT
Start: 2021-05-21 | End: 2021-05-21 | Stop reason: HOSPADM

## 2021-05-20 RX ORDER — ACETAMINOPHEN 325 MG/1
650 TABLET ORAL EVERY 6 HOURS PRN
Status: DISCONTINUED | OUTPATIENT
Start: 2021-05-20 | End: 2021-05-21 | Stop reason: HOSPADM

## 2021-05-20 RX ORDER — DULOXETIN HYDROCHLORIDE 60 MG/1
60 CAPSULE, DELAYED RELEASE ORAL DAILY
Status: DISCONTINUED | OUTPATIENT
Start: 2021-05-21 | End: 2021-05-21 | Stop reason: HOSPADM

## 2021-05-20 RX ORDER — PROMETHAZINE HYDROCHLORIDE 25 MG/1
12.5 TABLET ORAL EVERY 6 HOURS PRN
Status: DISCONTINUED | OUTPATIENT
Start: 2021-05-20 | End: 2021-05-21 | Stop reason: HOSPADM

## 2021-05-20 RX ORDER — SODIUM CHLORIDE 0.9 % (FLUSH) 0.9 %
5-40 SYRINGE (ML) INJECTION PRN
Status: DISCONTINUED | OUTPATIENT
Start: 2021-05-20 | End: 2021-05-21 | Stop reason: HOSPADM

## 2021-05-20 RX ORDER — FAMOTIDINE 20 MG/1
20 TABLET, FILM COATED ORAL 2 TIMES DAILY
Status: DISCONTINUED | OUTPATIENT
Start: 2021-05-21 | End: 2021-05-20 | Stop reason: SDUPTHER

## 2021-05-20 RX ORDER — ONDANSETRON 2 MG/ML
4 INJECTION INTRAMUSCULAR; INTRAVENOUS EVERY 6 HOURS PRN
Status: DISCONTINUED | OUTPATIENT
Start: 2021-05-20 | End: 2021-05-21 | Stop reason: HOSPADM

## 2021-05-20 RX ORDER — TRAZODONE HYDROCHLORIDE 50 MG/1
200 TABLET ORAL NIGHTLY
Status: DISCONTINUED | OUTPATIENT
Start: 2021-05-21 | End: 2021-05-21 | Stop reason: HOSPADM

## 2021-05-20 RX ORDER — HYDROXYCHLOROQUINE SULFATE 200 MG/1
200 TABLET, FILM COATED ORAL DAILY
Status: DISCONTINUED | OUTPATIENT
Start: 2021-05-21 | End: 2021-05-21 | Stop reason: HOSPADM

## 2021-05-20 RX ORDER — PANTOPRAZOLE SODIUM 40 MG/1
40 TABLET, DELAYED RELEASE ORAL
Status: DISCONTINUED | OUTPATIENT
Start: 2021-05-21 | End: 2021-05-21 | Stop reason: HOSPADM

## 2021-05-20 RX ORDER — IPRATROPIUM BROMIDE AND ALBUTEROL SULFATE 2.5; .5 MG/3ML; MG/3ML
1 SOLUTION RESPIRATORY (INHALATION)
Status: DISCONTINUED | OUTPATIENT
Start: 2021-05-21 | End: 2021-05-20

## 2021-05-20 RX ADMIN — IPRATROPIUM BROMIDE AND ALBUTEROL SULFATE 1 AMPULE: .5; 3 SOLUTION RESPIRATORY (INHALATION) at 21:23

## 2021-05-20 RX ADMIN — AZITHROMYCIN MONOHYDRATE 500 MG: 500 INJECTION, POWDER, LYOPHILIZED, FOR SOLUTION INTRAVENOUS at 23:12

## 2021-05-20 RX ADMIN — IOPAMIDOL 75 ML: 755 INJECTION, SOLUTION INTRAVENOUS at 20:50

## 2021-05-20 RX ADMIN — METHYLPREDNISOLONE SODIUM SUCCINATE 125 MG: 125 INJECTION, POWDER, FOR SOLUTION INTRAMUSCULAR; INTRAVENOUS at 18:57

## 2021-05-20 RX ADMIN — IPRATROPIUM BROMIDE AND ALBUTEROL SULFATE 1 AMPULE: .5; 3 SOLUTION RESPIRATORY (INHALATION) at 18:29

## 2021-05-20 RX ADMIN — WATER 1000 MG: 1 INJECTION INTRAMUSCULAR; INTRAVENOUS; SUBCUTANEOUS at 23:00

## 2021-05-20 RX ADMIN — BUDESONIDE 500 MCG: 0.5 SUSPENSION RESPIRATORY (INHALATION) at 18:24

## 2021-05-20 ASSESSMENT — PAIN DESCRIPTION - PAIN TYPE: TYPE: CHRONIC PAIN

## 2021-05-20 ASSESSMENT — ENCOUNTER SYMPTOMS
ABDOMINAL PAIN: 0
NAUSEA: 0
CONSTIPATION: 0
SHORTNESS OF BREATH: 1
SORE THROAT: 0
BACK PAIN: 0
BLOOD IN STOOL: 0
VOMITING: 0
EYE REDNESS: 0
RHINORRHEA: 0
COUGH: 1
EYE DISCHARGE: 0
CHEST TIGHTNESS: 0
WHEEZING: 1
DIARRHEA: 0

## 2021-05-20 NOTE — ED NOTES
Patient dyspneic at rest. Tripod breathing. Respiratory at bedside.       Juan Manuel Peters, MARVIN  05/20/21 [id number]

## 2021-05-20 NOTE — ED NOTES
Patient states shortness of breath has greatly improved since treatments. Patient sleeping upon entrance to room. Awakens easily.  remains at bedside.       Agustin Cline RN  05/20/21 1950

## 2021-05-20 NOTE — ED NOTES
Patient updated that CT scan has been ordered. Patient marked ready to exam. Patient and  updated with plan of care at this time. Deny needs at this time.       Agustin Cline RN  05/20/21 [id number]

## 2021-05-21 VITALS
DIASTOLIC BLOOD PRESSURE: 64 MMHG | HEIGHT: 64 IN | WEIGHT: 151.68 LBS | SYSTOLIC BLOOD PRESSURE: 109 MMHG | TEMPERATURE: 96.9 F | OXYGEN SATURATION: 96 % | RESPIRATION RATE: 16 BRPM | HEART RATE: 67 BPM | BODY MASS INDEX: 25.89 KG/M2

## 2021-05-21 LAB
ABSOLUTE EOS #: 0 K/UL (ref 0–0.44)
ABSOLUTE IMMATURE GRANULOCYTE: 0 K/UL (ref 0–0.3)
ABSOLUTE LYMPH #: 0.09 K/UL (ref 1.1–3.7)
ABSOLUTE MONO #: 0 K/UL (ref 0.1–1.2)
ANION GAP SERPL CALCULATED.3IONS-SCNC: 8 MMOL/L (ref 9–17)
BASOPHILS # BLD: 0 % (ref 0–2)
BASOPHILS ABSOLUTE: 0 K/UL (ref 0–0.2)
BUN BLDV-MCNC: 16 MG/DL (ref 8–23)
BUN/CREAT BLD: 31 (ref 9–20)
CALCIUM SERPL-MCNC: 9 MG/DL (ref 8.6–10.4)
CHLORIDE BLD-SCNC: 103 MMOL/L (ref 98–107)
CO2: 28 MMOL/L (ref 20–31)
CREAT SERPL-MCNC: 0.52 MG/DL (ref 0.5–0.9)
DIFFERENTIAL TYPE: ABNORMAL
EKG ATRIAL RATE: 96 BPM
EKG P AXIS: -6 DEGREES
EKG P-R INTERVAL: 160 MS
EKG Q-T INTERVAL: 372 MS
EKG QRS DURATION: 84 MS
EKG QTC CALCULATION (BAZETT): 469 MS
EKG R AXIS: 75 DEGREES
EKG T AXIS: 9 DEGREES
EKG VENTRICULAR RATE: 96 BPM
EOSINOPHILS RELATIVE PERCENT: 0 % (ref 1–4)
GFR AFRICAN AMERICAN: >60 ML/MIN
GFR NON-AFRICAN AMERICAN: >60 ML/MIN
GFR SERPL CREATININE-BSD FRML MDRD: ABNORMAL ML/MIN/{1.73_M2}
GFR SERPL CREATININE-BSD FRML MDRD: ABNORMAL ML/MIN/{1.73_M2}
GLUCOSE BLD-MCNC: 154 MG/DL (ref 70–99)
HCT VFR BLD CALC: 29.1 % (ref 36.3–47.1)
HEMOGLOBIN: 8.6 G/DL (ref 11.9–15.1)
IMMATURE GRANULOCYTES: 0 %
LYMPHOCYTES # BLD: 2 % (ref 24–43)
MCH RBC QN AUTO: 23.1 PG (ref 25.2–33.5)
MCHC RBC AUTO-ENTMCNC: 29.6 G/DL (ref 28.4–34.8)
MCV RBC AUTO: 78 FL (ref 82.6–102.9)
MONOCYTES # BLD: 0 % (ref 3–12)
MORPHOLOGY: NORMAL
NRBC AUTOMATED: 0 PER 100 WBC
PDW BLD-RTO: 18.6 % (ref 11.8–14.4)
PLATELET # BLD: 232 K/UL (ref 138–453)
PLATELET ESTIMATE: ABNORMAL
PMV BLD AUTO: 10 FL (ref 8.1–13.5)
POTASSIUM SERPL-SCNC: 4.2 MMOL/L (ref 3.7–5.3)
RBC # BLD: 3.73 M/UL (ref 3.95–5.11)
RBC # BLD: ABNORMAL 10*6/UL
SEG NEUTROPHILS: 98 % (ref 36–65)
SEGMENTED NEUTROPHILS ABSOLUTE COUNT: 4.51 K/UL (ref 1.5–8.1)
SODIUM BLD-SCNC: 139 MMOL/L (ref 135–144)
WBC # BLD: 4.6 K/UL (ref 3.5–11.3)
WBC # BLD: ABNORMAL 10*3/UL

## 2021-05-21 PROCEDURE — 94640 AIRWAY INHALATION TREATMENT: CPT

## 2021-05-21 PROCEDURE — 6370000000 HC RX 637 (ALT 250 FOR IP): Performed by: INTERNAL MEDICINE

## 2021-05-21 PROCEDURE — 97530 THERAPEUTIC ACTIVITIES: CPT

## 2021-05-21 PROCEDURE — 97161 PT EVAL LOW COMPLEX 20 MIN: CPT

## 2021-05-21 PROCEDURE — 85025 COMPLETE CBC W/AUTO DIFF WBC: CPT

## 2021-05-21 PROCEDURE — 80048 BASIC METABOLIC PNL TOTAL CA: CPT

## 2021-05-21 PROCEDURE — 6360000002 HC RX W HCPCS: Performed by: INTERNAL MEDICINE

## 2021-05-21 PROCEDURE — 2700000000 HC OXYGEN THERAPY PER DAY

## 2021-05-21 PROCEDURE — 36415 COLL VENOUS BLD VENIPUNCTURE: CPT

## 2021-05-21 PROCEDURE — 97166 OT EVAL MOD COMPLEX 45 MIN: CPT

## 2021-05-21 PROCEDURE — 94669 MECHANICAL CHEST WALL OSCILL: CPT

## 2021-05-21 PROCEDURE — 94761 N-INVAS EAR/PLS OXIMETRY MLT: CPT

## 2021-05-21 PROCEDURE — 93010 ELECTROCARDIOGRAM REPORT: CPT | Performed by: INTERNAL MEDICINE

## 2021-05-21 PROCEDURE — 2580000003 HC RX 258: Performed by: INTERNAL MEDICINE

## 2021-05-21 PROCEDURE — 92610 EVALUATE SWALLOWING FUNCTION: CPT

## 2021-05-21 RX ORDER — LEVOFLOXACIN 500 MG/1
500 TABLET, FILM COATED ORAL DAILY
Qty: 7 TABLET | Refills: 0 | Status: SHIPPED | OUTPATIENT
Start: 2021-05-21 | End: 2021-05-28

## 2021-05-21 RX ORDER — ALBUTEROL SULFATE 2.5 MG/3ML
2.5 SOLUTION RESPIRATORY (INHALATION) EVERY 4 HOURS PRN
Status: DISCONTINUED | OUTPATIENT
Start: 2021-05-21 | End: 2021-05-21 | Stop reason: HOSPADM

## 2021-05-21 RX ORDER — IPRATROPIUM BROMIDE AND ALBUTEROL SULFATE 2.5; .5 MG/3ML; MG/3ML
1 SOLUTION RESPIRATORY (INHALATION) 4 TIMES DAILY
Status: DISCONTINUED | OUTPATIENT
Start: 2021-05-21 | End: 2021-05-21 | Stop reason: HOSPADM

## 2021-05-21 RX ADMIN — ALBUTEROL SULFATE 2.5 MG: 2.5 SOLUTION RESPIRATORY (INHALATION) at 00:38

## 2021-05-21 RX ADMIN — HYDROCODONE BITARTRATE AND ACETAMINOPHEN 1 TABLET: 10; 325 TABLET ORAL at 00:18

## 2021-05-21 RX ADMIN — LEVOTHYROXINE SODIUM 150 MCG: 150 TABLET ORAL at 07:29

## 2021-05-21 RX ADMIN — PANTOPRAZOLE SODIUM 40 MG: 40 TABLET, DELAYED RELEASE ORAL at 07:29

## 2021-05-21 RX ADMIN — PREGABALIN 300 MG: 75 CAPSULE ORAL at 08:59

## 2021-05-21 RX ADMIN — RIVAROXABAN 20 MG: 20 TABLET, FILM COATED ORAL at 07:29

## 2021-05-21 RX ADMIN — TRAZODONE HYDROCHLORIDE 200 MG: 50 TABLET ORAL at 00:18

## 2021-05-21 RX ADMIN — METOPROLOL TARTRATE 25 MG: 25 TABLET, FILM COATED ORAL at 09:00

## 2021-05-21 RX ADMIN — ASPIRIN 81 MG: 81 TABLET, COATED ORAL at 09:00

## 2021-05-21 RX ADMIN — IPRATROPIUM BROMIDE AND ALBUTEROL SULFATE 1 AMPULE: .5; 3 SOLUTION RESPIRATORY (INHALATION) at 04:57

## 2021-05-21 RX ADMIN — POTASSIUM CHLORIDE 20 MEQ: 1500 TABLET, EXTENDED RELEASE ORAL at 09:00

## 2021-05-21 RX ADMIN — DULOXETINE HYDROCHLORIDE 60 MG: 60 CAPSULE, DELAYED RELEASE ORAL at 09:00

## 2021-05-21 RX ADMIN — SODIUM CHLORIDE: 9 INJECTION, SOLUTION INTRAVENOUS at 00:18

## 2021-05-21 RX ADMIN — HYDROCODONE BITARTRATE AND ACETAMINOPHEN 1 TABLET: 10; 325 TABLET ORAL at 11:52

## 2021-05-21 RX ADMIN — HYDROXYCHLOROQUINE SULFATE 200 MG: 200 TABLET ORAL at 09:00

## 2021-05-21 ASSESSMENT — PAIN DESCRIPTION - PAIN TYPE
TYPE: CHRONIC PAIN
TYPE: CHRONIC PAIN

## 2021-05-21 ASSESSMENT — PAIN SCALES - GENERAL
PAINLEVEL_OUTOF10: 3
PAINLEVEL_OUTOF10: 0
PAINLEVEL_OUTOF10: 7

## 2021-05-21 ASSESSMENT — PAIN DESCRIPTION - ORIENTATION
ORIENTATION: LOWER
ORIENTATION: LOWER

## 2021-05-21 ASSESSMENT — PAIN DESCRIPTION - DESCRIPTORS: DESCRIPTORS: ACHING;THROBBING

## 2021-05-21 ASSESSMENT — PAIN DESCRIPTION - LOCATION: LOCATION: BACK

## 2021-05-21 NOTE — PROGRESS NOTES
12/20/2018). Treatment Diagnosis: generalized weakness      Restrictions       Subjective   General  Chart Reviewed: Yes  Family / Caregiver Present: No  Referring Practitioner: Sindy Noyola MD  Diagnosis: pnemonia  Subjective  Subjective: Patient reports constant pain in her back secondary to multiple back surgeries. General Comment  Comments: Patient asleep in the beside chair upon OT arrival. Patient agreeable to OT evaluation to go to the bathroom. Pain Assessment  Pain Assessment: 0-10  Pain Level: 7  Pain Type: Chronic pain  Pain Location: Back  Pain Orientation: Lower  Pain Descriptors: Aching; Throbbing  Pain Frequency: Continuous  Non-Pharmaceutical Pain Intervention(s):  Other (Comment) (See MAR.)  Height and Weight  Height: 5' 4\" (162.6 cm)  Oxygen Therapy  SpO2: 96 %  O2 Device: None (Room air)  Social/Functional History  Social/Functional History  Lives With: Spouse  Type of Home: House  Home Layout: One level  Bathroom Shower/Tub: Tub/Shower unit  Bathroom Accessibility: Accessible  Receives Help From: Family  Homemaking Assistance: Independent  Homemaking Responsibilities: Yes  Ambulation Assistance: Independent  Transfer Assistance: Independent  Active : No  Patient's  Info: Patient states she hasnt driven recently due to having seizures in the past.       Objective              Balance  Sitting Balance: Stand by assistance  Standing Balance: Stand by assistance  Functional Mobility  Functional - Mobility Device: No device  Activity: To/from bathroom  Assist Level: Stand by assistance  ADL  Feeding: Setup  Grooming: Stand by assistance  UE Bathing: Modified independent   LE Bathing: Stand by assistance  UE Dressing: Modified independent   LE Dressing: Stand by assistance  Toileting: Stand by assistance           Transfers  Sit to stand: Stand by assistance  Stand to sit: Stand by assistance       Plan   Plan  Times per week: 7  Times per day: Daily  Current Treatment

## 2021-05-21 NOTE — PLAN OF CARE
Problem: Pain:  Goal: Pain level will decrease  Description: Pain level will decrease  Outcome: Ongoing  Note: Pain assessed routinely and as needed with a 0-10 scale. PRN pain medication given as appropriate per orders. Pain reassessed within an hour, will continue to monitor    Goal: Control of acute pain  Description: Control of acute pain  Outcome: Ongoing  Goal: Control of chronic pain  Description: Control of chronic pain  Outcome: Ongoing     Problem: Falls - Risk of:  Goal: Will remain free from falls  Description: Will remain free from falls  Outcome: Ongoing  Note: Fall risk assessment done and patient is a high risk for falls. Alarms on as needed for patient safety. Patient being monitored on a regular basis. No falls noted at this time. Goal: Absence of physical injury  Description: Absence of physical injury  Outcome: Ongoing     Problem: Skin Integrity:  Goal: Will show no infection signs and symptoms  Description: Will show no infection signs and symptoms  Outcome: Ongoing  Note: Patient is able to turn self as needed. No new open areas or redness noted. Will continue to assess     Goal: Absence of new skin breakdown  Description: Absence of new skin breakdown  Outcome: Ongoing     Problem: OXYGENATION/RESPIRATORY FUNCTION  Goal: Patient will maintain patent airway  Outcome: Ongoing  Note: RR and regular and unlabored at rest. Dyspnea with exertion. [email or web address]. Will continue to monitor.   Goal: Patient will achieve/maintain normal respiratory rate/effort  Description: Respiratory rate and effort will be within normal limits for the patient  Outcome: Ongoing

## 2021-05-21 NOTE — ED NOTES
Patient requesting to go home. Patient does not have oxygen at home. Writer removed NC to trial patient on room air. Patient requesting snack which was provided at this time.       Gail Vallejo RN  05/20/21 [id number]

## 2021-05-21 NOTE — PROGRESS NOTES
Patient arrived to [facility] room 331 from ER. Patient able to ambulate safely to bed. A&Ox3. VS are stable. [email or web address], patient does not wear at home.  RR are unlabored at rest.

## 2021-05-21 NOTE — PROGRESS NOTES
SW met with pt to complete assessment. Pt is alert and oriented and pleasant throughout assessment. Pt is a 77year old  female admitted for community acquired bacterial pneumonia. Pt reports that she lives with her spouse in their home in [place]. Pt reports that she has a walker, wheelchair, shower chair, and grab bars at home but she is not using them. Pt does not use any community services at home. Pt reports that her spouse transports her to appointments. Pt is a full code and follows with Dr Giancarlo Curry as PCP. Pt does not have advance directives and reports that she is not interested in these currently. Pt states her medications are affordable with her insurance. Pt plans to return home and has a discharge order for today. Pt identifies no discharge needs or concerns at this time. SW will remain available as needed.  Jeremiah BAILONW 5/21/2021

## 2021-05-21 NOTE — PROGRESS NOTES
Accessibility: Accessible  Home Equipment: Rolling walker, Cane (Only uses PRN)  Receives Help From: Family  ADL Assistance: Independent  Homemaking Assistance: Independent  Homemaking Responsibilities: Yes  Ambulation Assistance: Independent  Transfer Assistance: Independent  Active : No  Additional Comments: History of falls at home due to seizures in the past; none recently    Cognition   Cognition  Overall Cognitive Status: WNL    Objective  AROM RLE (degrees)  RLE AROM: WFL  AROM LLE (degrees)  LLE AROM : WFL     Strength RLE  Strength RLE: WFL  Strength LLE  Strength LLE: WFL        Bed mobility  Comment: sitting up at time of eval  Transfers  Sit to Stand: Supervision  Stand to sit: Supervision  Ambulation  Ambulation?: Yes  Ambulation 1  Surface: level tile  Device: No Device  Assistance: Supervision  Distance: 150ft  Comments: Ambulates with knees flexed and wide NOGC; supervision for eval purposes, no LOB noted during gait      Balance  Sitting - Dynamic: Good  Standing - Static: Good  Standing - Dynamic: Good;-        Plan   Plan  Plan Comment: Pt being discharged from facility  Safety Devices  Type of devices: All fall risk precautions in place    AM-PAC Score  AM-PAC Inpatient Mobility Raw Score : 24 (05/21/21 1450)  AM-PAC Inpatient T-Scale Score : 61.14 (05/21/21 1450)  Mobility Inpatient CMS 0-100% Score: 0 (05/21/21 1450)  Mobility Inpatient CMS G-Code Modifier : CH (05/21/21 1450)    Goals  Short term goals  Time Frame for Short term goals: 1 visit  Short term goal 1: Pt to perform functional mobility and gait independently to ensure safe discharge home.   Patient Goals   Patient goals : home       Therapy Time   Individual Concurrent Group Co-treatment   Time In 1404         Time Out 1428         Minutes 24                 Gabriel Farmer, PT , DPT, CMPT

## 2021-05-21 NOTE — ED NOTES
Writer at bedside with Amari CANADA to discuss results and recommend admission. Patient agreeable for admission at this time. Patient pulse ox 88% on RA at this time. Oxygen reapplied.       Viky Thompson RN  05/20/21 0114

## 2021-05-21 NOTE — PROGRESS NOTES
RESPIRATORY ASSESSMENT PROTOCOL                                                                                              Patient Name: Adwoa Perry Room#: 7243/[id number] : 1945     Admitting diagnosis: Community acquired bacterial pneumonia [J15.9]       Medical History:   Past Medical History:   Diagnosis Date    COPD (chronic obstructive pulmonary disease) ([address]     Depression     GERD (gastroesophageal reflux disease)     Osteoporosis     Pneumonia        PATIENT ASSESSMENT    LABORATORY DATA  Hematology:   Lab Results   Component Value Date    WBC 7.4 2021    RBC 3.87 2021    HGB 9.0 2021    HCT 30.4 2021     2021     Chemistry:    Lab Results   Component Value Date    PHART 7.296 10/22/2020    JWS0UBH 52.8 10/22/2020    PO2ART 85.0 10/22/2020    H8LKCHPW 95.3 10/22/2020    FLB6WSO 25.2 10/22/2020    PBEA 8 2020       VITALS  Pulse: 86   Resp: 16  BP: 128/67  SpO2: 95 % O2 Device: Nasal cannula  Temp: 97.5 °F (36.4 °C)    SKIN COLOR  [x] Normal  [] Pale  [] Dusky  [] Cyanotic    RESPIRATORY PATTERN  [x] Normal  [] Dyspnea  [] Cheyne-Granger  [] Kussmaul  [] Biots    AMBULATORY  [] Yes  [] No  [x] With Assistance    PEAK FLOW  Predicted:     Personal Best:        VITAL CAPACITY  Predicted value:  ml  Actual Value:  ml  30% of Predicted:  ml  Patient Acuity 0 1 2 3 4 Score   Level of Concious (LOC) [x]  Alert & Oriented or Pt normal LOC []  Confused;follows directions []  Confused & uncooper-ative []  Obtunded []  Comatose 0   Respiratory Rate  (RR) [x]  Reg. rate & pattern. 12 - 20 bpm  []  Increased RR.  Greater than 20 bpm   []  SOB w/ exertion or RR greater than 24 bpm []  Access- ory muscle use at rest. Abn.  resp. []  SOB at rest.   0   Bilateral Breath Sounds (BBS) []  Clear []  Diminish-ed bases  []  Diminish-ed t/o, or rales   [x]  Sporadic, scattered wheezes or rhonchi []  Persistentwheezes and, or absent BBS 3   Cough []  Strong, effective, & non-prod. [x]  Effective & prod. Less than 25 ml (2 TBSP) over past 24 hrs []  Ineffective & non-prod to less than 25 ML over past 24 hrs []  Ineffective and, or greater than 25 ml sputum prod. past 24 hrs. []  Nonspon- taneous; Requires suctioning 1   Pulmonary History  (PULM HX) []  No smoking and no chronic pulmonaryhistory []  Former smoker. Quit over 12 mos. ago []  Current smoker or quit w/ in 12 mos []  Pulm. History and, or 20 pk/yr smoking hx [x]  Admitted w/ acute pulm. dx and, or has been admitted w/ pulm. dx 2 or more times over past 12 mos 4   Surgical History this Admit  (SURG HX) [x]  No surgery []  General surgery []  Lower abdominal []  Thoracic or upper abdominal   []  Thoracic w/ pulm. disease 0   Chest X-Ray (CXR)/CT Scan []  Clear or not applicable []  Not available []  Atelect- asis or pleural effusions []  Localized infiltrate or pulm. edema [x]  Con-solidated Infiltrates, bilateral, or in more than 1 lobe 4   Slow or Forced VC, FEV1 OR PEFR (PULM FXN)  [x]  80% or greater, or not indicated []  Pt. unable to perform []  FEV1 or PEFR or VC 51-79%. []  FEV1 or PEFR or VC  30-49%   []  FEV1 or PEFR or VC less than 30%   0   TOTAL ACUITY: 12       CARE PLAN    If Acuity Level is 2, 3, or 4 in any of the following:    [x] BILATERAL BREATH SOUNDS (BBS)     [x] PULMONARY HISTORY (PULM HX)  [] PULMONARY FUNCTION (PULM FX)    Goal: Improve respiratory functions in patients with airway disease and decrease WOB    [] AEROSOL PROTOCOL    Total Acuity:   16-32  []  Secondary Assessment in 24 hrs Total Acuity:  9-15  [x]  Secondary Assessment in 24 hrs Total Acuity:  4-8  []  Secondary Assessment in 48 hrs Total Acuity:  0-3  []  Secondary Assessment in 72 hrs   HHN AEROSOL THERAPY with  [physician-ordered bronchodilator(s)] q 4 & Albuterol PRN q2 hrs. Breath-Actuated Neb if BBS Acuity = 4, and pt. can use MP. Notify physician if condition deteriorates.   HHN AEROSOL THERAPY with  [physician-ordered bronchodilator(s)]  QID and Albuterol PRN q4 hrs. Breath-Actuated Neb if BBS Acuity = 4, and pt. can use MP. Notify physician if condition deteriorates. MDI THERAPY with  2 actuations of [physician-ordered bronchodilator(s)] via spacer TID Albuterol and PRNq4 hrs. If unable to utilize MDI: HHN [physician-ordered bronchodilator(s)] TID and Albuterol PRN q4 hrs. Notify physician if condition deteriorates. MDI THERAPY with  [physician-ordered bronchodilator(s)] via spacer TID PRN. If unable to utilize MDI: HHN [physician-ordered bronchodilator(s)] TID PRN. Notify physician if condition deteriorates. If Acuity Level is 2, 3, or 4 in any of the following:    [] COUGH     [] SURGICAL HISTORY (SURG HX)  [x] CHEST XRAY (CXR)    Goal: Improvement in sputum mobilization in patients with ineffective airway clearance. Reverse atelectasis. [] Bronchopulmonary Hygiene Protocol    Total Acuity:   16-32  []  Secondary Assessment in 24 hrs Total Acuity:  9-15  [x]  Secondary Assessment in 24 hrs Total Acuity:  4-8  []  Secondary Assessment in 48 hrs Total Acuity:  0-3  []  Secondary Assessment in 72 hrs   METANEB QID with [physician-ordered bronchodilator(s)] if CXR Acuity = 4; otherwise:  PD&P, PEP, or Vest QID & PRN  NT Sxn PRN for ineffective cough  METANEB QID with [physician-ordered bronchodilator(s)] if CXR Acuity = 4; otherwise:  PD&P, PEP, or Vest TID & PRN  NT Sxn PRN for ineffective cough  Instruct patient to self-perform IS q1hr WA   Directed Cough self-performed q1hr WA     If Acuity Level is 2 or above in the following:    [] PULMONARY HISTORY (PULM HX)    Goal: Assist patient in quitting smoking to slow or stop the progression of lung disease.     [] Smoking Cessation Protocol    SMOKING CESSATION EDUCATION provided according to policy [id number]: (marlyn with an X)  ____Yes    __x__ No     ____ NA    Smoking Cessation Booklet given:  ____Yes  ____No ____Patient Mary Failing

## 2021-05-21 NOTE — ED NOTES
Called Dr Dennys Cole via answering service, waiting for call back     Татьяна Sweeney  05/20/21 2712

## 2021-05-21 NOTE — H&P
[address] [facility]            HISTORY AND PHYSICAL     Pt Name: Richard Mackenzie  MRN: [id number]  [place] 1945  Date of evaluation: 5/20/2021  Provider: Renea Perez Dr       Chief Complaint   Patient presents with    Shortness of Breath     brougth by EMS from urgent care for O2 saturations in the mid to high 80's. HISTORY OF PRESENT ILLNESS      History Obtained From:  patient  PCP: Regina Olea MD    History of Present Illness: The patient is a 77 y.o. female who presents with worsening shortness of breath over the past 2 days. Patient reports that she was at urgent care for a cough and difficulty breathing when they told her her oxygen levels were low and they sent her to the ER by ambulance. She states that she has had to be on oxygen before in the past requiring an intubation due to significant pneumonia last year. She states that she does not want to get that bad and so she came to the ER today to be seen. She denies any chest pain or lightheadedness. Denies any fever or chills at home. Reports she has been eating and drinking well. Reports she does have a history of COPD. She denies any abdominal pain nausea or vomiting. Denies any change in medications recently. No other complaints at this time.     Past Medical History:        Diagnosis Date    COPD (chronic obstructive pulmonary disease) (HCC)     Depression     GERD (gastroesophageal reflux disease)     Osteoporosis     Pneumonia        Past Surgical History:        Procedure Laterality Date    BACK SURGERY      BREAST SURGERY      CARPAL TUNNEL RELEASE      FRACTURE SURGERY Left 12/20/2018    ORIF wrist    HYSTERECTOMY      JOINT REPLACEMENT      right knee    JOINT REPLACEMENT      WRIST FRACTURE SURGERY Left 12/20/2018    WRIST OPEN REDUCTION INTERNAL FIXATION-DISTAL RADIUS performed by Dahlia Pittman MD at [facility]       Medications Prior to Admission:    Prior to Admission medications    Medication Sig Start Date End Date Taking? Authorizing Provider   furosemide (LASIX) 20 MG tablet Take 1 tablet by mouth daily 5/14/21  Yes Paulina Mata MD   rivaroxaban (XARELTO) 20 MG TABS tablet Take 1 tablet by mouth daily (with breakfast) 4/22/21  Yes Paulina Mata MD   ascorbic acid (VITAMIN C) 1000 MG tablet Take 1 tablet by mouth 2 times daily for 7 days 12/29/20 5/20/21 Yes RYAN Augustine CNP   Vitamin D (CHOLECALCIFEROL) 50 MCG (2000 UT) TABS tablet Take 1 tablet by mouth daily for 7 days 12/30/20 5/20/21 Yes RYAN Augustine CNP   HYDROcodone-acetaminophen (NORCO)  MG per tablet Take 1 tablet by mouth every 6 hours as needed for Pain. Yes Historical Provider, MD   pregabalin (LYRICA) 300 MG capsule Take 1 capsule by mouth 2 times daily for 30 days.  11/6/20 5/20/21 Yes Aster Gonzalez MD   metoprolol tartrate (LOPRESSOR) 25 MG tablet Take 1 tablet by mouth 2 times daily 11/5/20  Yes Aster Gonzalez MD   Hydroxychloroquine Sulfate (PLAQUENIL PO) Take by mouth daily    Yes Historical Provider, MD   albuterol sulfate  (90 Base) MCG/ACT inhaler Inhale 2 puffs into the lungs 4 times daily 10/8/18  Yes Ba Santos PA-C   levothyroxine (SYNTHROID) 150 MCG tablet Take 150 mcg by mouth Daily   Yes Historical Provider, MD   aspirin 81 MG tablet Take 81 mg by mouth daily   Yes Historical Provider, MD   DULoxetine (CYMBALTA) 60 MG extended release capsule Take 60 mg by mouth daily   Yes Historical Provider, MD   traZODone (DESYREL) 100 MG tablet Take 200 mg by mouth nightly    Yes Historical Provider, MD   pantoprazole sodium (PROTONIX) 40 MG PACK packet Take 40 mg by mouth 2 times daily (before meals)   Yes Historical Provider, MD   potassium chloride (KLOR-CON) 20 MEQ packet Take 20 mEq by mouth 2 times daily   Yes Historical Provider, MD   calcium citrate-vitamin D (CITRICAL + D) 315-250 MG-UNIT TABS per tablet Take 1 tablet by mouth 2 times daily (with meals) Yes Historical Provider, MD   alendronate (FOSAMAX) 70 MG tablet Take 70 mg by mouth every 7 days   Yes Historical Provider, MD       Allergies:  Patient has no known allergies. Social History:   TOBACCO:   reports that she quit smoking about 44 years ago. She has a 10.00 pack-year smoking history. She has never used smokeless tobacco.  ETOH:   reports no history of alcohol use. Family History:       Problem Relation Age of Onset    Heart Attack Father 61    Heart Attack Sister     Heart Disease Brother        Review of Systems:  Review of Systems   Constitutional: Negative for chills, diaphoresis and fever. HENT: Negative for congestion, ear pain, rhinorrhea and sore throat. Eyes: Negative for discharge, redness and visual disturbance. Respiratory: Positive for cough, shortness of breath and wheezing. Negative for chest tightness. Cardiovascular: Negative for chest pain and palpitations. Gastrointestinal: Negative for abdominal pain, blood in stool, constipation, diarrhea, nausea and vomiting. Endocrine: Negative for polydipsia, polyphagia and polyuria. Genitourinary: Negative for decreased urine volume, difficulty urinating, dysuria, frequency and hematuria. Musculoskeletal: Negative for arthralgias, back pain and myalgias. Skin: Negative for pallor and rash. Allergic/Immunologic: Negative for food allergies and immunocompromised state. Neurological: Negative for dizziness, syncope, weakness and light-headedness. Hematological: Negative for adenopathy. Does not bruise/bleed easily. Psychiatric/Behavioral: Negative for behavioral problems and suicidal ideas. The patient is not nervous/anxious.       All other systems were reviewed with the patient and are negative except as stated    Objective:    Vitals:   Temp: 97.8 °F (36.6 °C)  BP: (!) 111/45  Resp: 11  Pulse: 75  SpO2: 97 % on nasal cannula  -----------------------------------------------------------------  Exam:  GEN: Well-developed well-nourished no obvious distress  HEENT: Pupils are equal round and reactive to light. Mucous membranes are moist.  Uvula is midline. No oral pharyngeal swelling or edema. NECK: Supple, no tenderness  CVS: Regular rate regular rhythm, no murmurs  PULM: Bilateral wheezing noted expiratory. No respiratory distress. On nasal cannula oxygen. ABD: Soft and nontender, nondistended  EXT: Moves all 4 extremities with ease. No significant swelling. No tenderness. NEURO: Patient is awake alert and oriented to person place time and situation. No focal neurologic deficit. SKIN: No rashes. No skin lesions.    -----------------------------------------------------------------  Diagnostic Data:   · All diagnostic data was reviewed  Lab Results   Component Value Date    WBC 7.4 05/20/2021    HGB 9.0 (L) 05/20/2021    MCV 78.6 (L) 05/20/2021     05/20/2021      Lab Results   Component Value Date    GLUCOSE 114 (H) 05/20/2021    BUN 20 05/20/2021    CREATININE 0.78 05/20/2021     05/20/2021    K 3.4 (L) 05/20/2021    CALCIUM 9.5 05/20/2021     05/20/2021    CO2 29 05/20/2021       CT CHEST PULMONARY EMBOLISM W CONTRAST   Final Result   1. No pulmonary embolus. 2. There are areas of consolidation in the right upper and lower lobes with   ground-glass opacity in the left upper lobe. Given the extensive pneumonia   seen on prior CT, this could represent residual consolidation/scarring. However, acute infection could have a similar appearance. Clinical   correlation is recommended. 3. Mediastinal lymphadenopathy, which appears improved in the interval.   4. 7 mm noncalcified right lower lobe nodule. RECOMMENDATIONS:   7 mm right solid pulmonary nodule. Recommend a non-contrast Chest CT at 6-12   months.  If patient is high risk for malignancy, recommend an additional   non-contrast Chest CT at 18-24 months; if patient is low risk for malignancy   a non-contrast Chest CT at

## 2021-05-21 NOTE — PROGRESS NOTES
Comprehensive Nutrition Assessment    Type and Reason for Visit:  Initial    Nutrition Recommendations/Plan: continue current diet    Nutrition Assessment:  No significant PES. Pt states of good PO, no weight loss. Eats 2 meals and snack daily. No weight loss or PO chANGES. Malnutrition Assessment:  Malnutrition Status:  No malnutrition    Context:  Acute Illness     Findings of the 6 clinical characteristics of malnutrition:  Energy Intake:  No significant decrease in energy intake  Weight Loss:  No significant weight loss     Body Fat Loss:  No significant body fat loss     Muscle Mass Loss:  No significant muscle mass loss    Fluid Accumulation:  No significant fluid accumulation     Strength:  Not Performed      Nutrition Related Findings:  active bowel sounds x 4. Appears well nourished. Wounds:  None       Current Nutrition Therapies:    DIET GENERAL; Anthropometric Measures:  · Height: 5' 4\" (162.6 cm)  · Current Body Weight: 151 lb 10.8 oz (68.8 kg)   · Admission Body Weight: 149 lb 0.5 oz (67.6 kg)    · Usual Body Weight: 164 lb (74.4 kg) (10/22/20)     · Ideal Body Weight: 120 lbs; % Ideal Body Weight 126.4 %   · BMI: 26  · BMI Categories: Overweight (BMI 25.0-29. 9)       Nutrition Diagnosis:   No nutrition diagnosis at this time     Nutrition Interventions:   Food and/or Nutrient Delivery:  Continue Current Diet  Nutrition Education/Counseling:  No recommendation at this time   Coordination of Nutrition Care:  Continue to monitor while inpatient    Goals:  PO > 75% of meals        Recent Labs     05/20/21  1815 05/21/21  0615    139   K 3.4* 4.2    103   CO2 29 28   BUN 20 16   CREATININE 0.78 0.52   GLUCOSE 114* 154*   ALT 21  --    ALKPHOS 53  --    GFR                            Lab Results   Component Value Date    LABALBU 4.3 05/20/2021      Nutrition Monitoring and Evaluation:   Behavioral-Environmental Outcomes:  None Identified   Food/Nutrient Intake Outcomes: Food and Nutrient Intake  Physical Signs/Symptoms Outcomes:  Biochemical Data, Weight     Discharge Planning:    Continue current diet     Electronically signed by Jayla Gibson RD, LD on 5/21/21 at 12:28 PM EDT    Contact: [phone]

## 2021-05-21 NOTE — PROGRESS NOTES
Discharge instructions reviewed with patient and spouse; aware of need to call for f/u appt with PCP. No questions at this time. Patient d/c'd off unit via w/c with spouse to home. Belongings in hand. No issues noted.

## 2021-05-21 NOTE — PROGRESS NOTES
Placido Thompson M.D. Internal Medicine Progress Note     SUBJECTIVE:    Patient seen for f/u of Community acquired bacterial pneumonia. She is feeling much better today. Denies fever, chills, cough or SOB. She is 96% on room air. She would like to go home. ROS:   Constitutional: negative  for fevers, and negative for chills. Respiratory: negative for shortness of breath, negative for cough, and negative for wheezing  Cardiovascular: negative for chest pain, and negative for palpitations  Gastrointestinal: negative for abdominal pain, negative for nausea,negative for vomiting, negative for diarrhea, and negative for constipation     All other systems were reviewed with the patient and are negative unless otherwise stated in HPI    OBJECTIVE:  Vitals:   Temp: 96.9 °F (36.1 °C)  BP: 109/64  Resp: 16  Pulse: 67  SpO2: 96 % on room air    24HR INTAKE/OUTPUT:      Intake/Output Summary (Last 24 hours) at 5/21/2021 1357  Last data filed at 5/21/2021 0811  Gross per 24 hour   Intake 917 ml   Output 750 ml   Net 167 ml     -----------------------------------------------------------------  Exam:  GEN:    Awake, alert and oriented x3. EYES:  EOMI, pupils equal   NECK: Supple. No lymphadenopathy. No carotid bruit  CVS:    regular rate and rhythm, no audible murmur  PULM:  CTA, no wheezes, rales or rhonchi, no acute respiratory distress  ABD:    Bowels sounds normal.  Abdomen is soft. No distention. no tenderness to palpation. EXT:   no edema bilaterally . No calf tenderness. NEURO: Moves all extremities. Motor and sensory are grossly intact  SKIN:  No rashes.   No skin lesions.    -----------------------------------------------------------------  Diagnostic Data:    · All available data reviewed  Lab Results   Component Value Date    WBC 4.6 05/21/2021    HGB 8.6 (L) 05/21/2021    MCV 78.0 (L) 05/21/2021     05/21/2021      Lab Results   Component Value Date    GLUCOSE 154 (H) 05/21/2021    BUN 16 05/21/2021    CREATININE 0.52 05/21/2021     05/21/2021    K 4.2 05/21/2021    CALCIUM 9.0 05/21/2021     05/21/2021    CO2 28 05/21/2021       CT CHEST PULMONARY EMBOLISM W CONTRAST   Final Result   1. No pulmonary embolus. 2. There are areas of consolidation in the right upper and lower lobes with   ground-glass opacity in the left upper lobe. Given the extensive pneumonia   seen on prior CT, this could represent residual consolidation/scarring. However, acute infection could have a similar appearance. Clinical   correlation is recommended. 3. Mediastinal lymphadenopathy, which appears improved in the interval.   4. 7 mm noncalcified right lower lobe nodule. RECOMMENDATIONS:   7 mm right solid pulmonary nodule. Recommend a non-contrast Chest CT at 6-12   months. If patient is high risk for malignancy, recommend an additional   non-contrast Chest CT at 18-24 months; if patient is low risk for malignancy   a non-contrast Chest CT at 18-24 months is optional.   These guidelines do not apply to immunocompromised patients and patients with   cancer. Follow up in patients with significant comorbidities as clinically   warranted. For lung cancer screening, adhere to Lung-RADS guidelines. Reference: Radiology. 2017; 284(1):228-43. XR CHEST PORTABLE   Final Result   Diffusely prominent interstitial markings without pulmonary consolidation. This could be seen with edema, pneumonitis, or underlying interstitial lung   disease.              ASSESSMENT / PLAN:  Community acquired bacterial pneumonia  · She has improved much quicker than expected and is much improved from admission  · Will DC home with PO Abx: Levaquin  · COPD  · Stable   · Continue albuterol  · Chronic combined systolic and diastolic CHF  · Stable   · Continue Metoprolol  · Lock IV  · History of atrial fibrillation 10/2020 during admission for sepsis  · Currently on Xarelto for stroke prophylaxis and Metoprolol for rate control   · Nutrition status:  Well developed, well nourished with no malnutrition  · DVT prophylaxis: Xarelto   · High risk medications: none   · Disposition:  Discharge plan is home    Tennille Talbert MD , M.D.  5/21/2021  1:57 PM

## 2021-05-21 NOTE — PLAN OF CARE
Problem: Pain:  Goal: Control of chronic pain  Outcome: Ongoing  Note: Chronic pain managed with pain medication regimen patient also uses at home. Repositioned as needed for comfort. Problem: Falls - Risk of:  Goal: Will remain free from falls  Outcome: Ongoing  Note: Armband and door signage continued. Patient is A&Ox3 and has made no attempt to get up on own. Call light in reach, bed in lowest position, non-skid socks in place. Problem: OXYGENATION/RESPIRATORY FUNCTION  Goal: Patient will maintain patent airway  Outcome: Ongoing  Note: No cough noted. Patient able to recover within a couple of minutes after exertion. Educated on proper breathing technique and to take breaks. Problem: OXYGENATION/RESPIRATORY FUNCTION  Goal: Patient will achieve/maintain normal respiratory rate/effort  Outcome: Ongoing  Note: RR is WNLS with regular and unlabored breathing. Patient does c/o dyspnea on exertion, but is able to tolerate ambulation well.  Patient tolerated weaning off of supplemental O2 this am.

## 2021-05-21 NOTE — DISCHARGE SUMMARY
Dinh Vásquez M.D. Internal Medicine Discharge Summary    Patient ID:  Mckay Saldivar  [id number]  1945    Admission date: 5/20/2021    Discharge date: 5/21/2021     Admitting Physician: Tanvir Walker MD     Primary Care Physician: Jarocho Cespedes MD     Primary Discharge Diagnoses:   Patient Active Problem List    Diagnosis Date Noted    Sepsis due to pneumonia ([address] 08/16/2020    Septic shock ([address] 08/16/2020    Acute metabolic encephalopathy 43/27/5695    Lactic acidosis 08/16/2020    Community acquired bacterial pneumonia 05/20/2021    Pneumonia due to COVID-19 virus 12/26/2020    Sepsis with acute hypoxic respiratory failure and septic shock ([address]     Biventricular congestive heart failure ([address]     Encephalopathy     Acute respiratory failure with hypoxia and hypercapnia ([address]     Severe sepsis ([address] 10/22/2020    COPD exacerbation ([address] 10/22/2020    Status post surgery 12/20/2018    Pneumonia of both lungs due to infectious organism 10/05/2018    Hypothyroidism 10/05/2018    Major depressive disorder 10/05/2018    Chronic midline low back pain without sciatica 10/05/2018    Iron deficiency anemia 10/05/2018       Additional Diagnoses:       Diagnosis Date    COPD (chronic obstructive pulmonary disease) ([address]     Depression     GERD (gastroesophageal reflux disease)     Osteoporosis     Pneumonia         Hospital Course: The patient was admitted for community acquired pneumonia. She has a h/o Covid-19 pneumonia requiring mechanical ventilation from 12/26/20 to 1/5/21. Since then she was on O2 at home for a couple weeks and has since been weaned off. She was in her normal state of health until 2 days ago when she started having SOB. She went to Urgent Care yesterday for cough and was apparently hypoxic and transported to [facility] ER. In ER she was 97% on nasal cannula with normal HR and RR and no fever.   Chest CT showed multifocal ground glass opacities which could be residual scarring from previous Covid however new infection could not be ruled out. She was admitted for IV Abx for presumed CAP. Overnight she was feeling much better and this morning was weaned off O2. SpO2 remained 97% on room air. Vitals are all stable. She is ambulating in her room without difficulty or SOB. She would like to go home. She is deemed stable for DC and will be rx'd empiric Levaquin. I had a long talk with her about listening to her body and stopping to rest when needed. She considers herself a Claribel body\" and just doesn't slow down according to her . She states she will take more breaks if needed when she gets winded and was informed that this may continue for months after a severe covid infection like she had. Discharge Exam:  GEN:    Awake, alert and oriented x3. EYES:  EOMI, pupils equal   NECK: Supple. No lymphadenopathy. No carotid bruit  CVS:    regular rate and rhythm, no audible murmur  PULM:  CTA, no wheezes, rales or rhonchi, no acute respiratory distress  ABD:    Bowels sounds normal.  Abdomen is soft. No distention. no tenderness to palpation. EXT:   no edema bilaterally . No calf tenderness. NEURO: Moves all extremities. Motor and sensory are grossly intact  SKIN:  No rashes. No skin lesions. Consultants:    · none    Procedures:    · none    Complications:   · none    Significant Diagnostic Studies:   · Discharge Labs:  Lab Results   Component Value Date    WBC 4.6 05/21/2021    HGB 8.6 (L) 05/21/2021    MCV 78.0 (L) 05/21/2021     05/21/2021      Lab Results   Component Value Date    GLUCOSE 154 (H) 05/21/2021    BUN 16 05/21/2021    CREATININE 0.52 05/21/2021     05/21/2021    K 4.2 05/21/2021    CALCIUM 9.0 05/21/2021     05/21/2021    CO2 28 05/21/2021       Discharge Condition:   · stable    Disposition:   · Discharge to Home    Discharge Medications:    [facility],[address] Medication Instructions RFC:[id number] with Vivek Irving MD in 1-2 weeks       CORE MEASURES on Discharge (if applicable)  ACE/ARB in CHF: N/A  ASA in MI: N/A  Statin in MI: N/A  Statin in CVA: N/A  Antiplatelet in CVA: N/A    Total time spent on discharge services: 40 minutes  Including the following activities:  · Evaluation and Management of patient  · Discussion with patient and/or surrogate about current care plan  · Coordination with Case Management and/or   · Coordination of care with Consultants (if applicable)   · Coordination of care with Receiving Facility Physician (if applicable)  · Completion of DME forms (if applicable)  · Preparation of Discharge Summary  · Preparation of Medication Reconciliation  · Preparation of Discharge Prescriptions      Signed:  Ricki Hernandez MD, M.D.  5/21/2021  2:18 PM

## 2021-05-21 NOTE — PROGRESS NOTES
PHARMACY NOTE    Jeannine Dunn was ordered the oral bisphosphonate, Fosamax 70mg PO every 7 days. Oral Bisphosphonates have an increased risk of serious GI events if the strict dosing instructions are not followed. Per the FDA labeling, oral bisphosphonates must be taken at least 30 min (60 min for ibandronate) before the first food, beverage or medication of the day. Patients MUST also avoid lying down for at least 30 min (60 minutes for ibandronate) after taking the oral bisphosphonate. Because these strict dosage instructions are difficult to follow in many hospitalized patients, orders for oral bisphosphonate therapy will be discontinued per the [address] [place] [address]. Consider risk versus benefits when resuming the oral bisphosphonate at discharge.     [employer]  Pharmacist  [phone]

## 2021-05-21 NOTE — ED PROVIDER NOTES
[facility] ED  EMERGENCY DEPARTMENT ENCOUNTER      Pt Name: Kyaw Amin  MRN: [id number]  [place] 1945  Date of evaluation: 5/20/2021  Provider: Renea Carranza Dr     Chief Complaint   Patient presents with    Shortness of Breath     brougth by EMS from urgent care for O2 saturations in the mid to high 80's. HISTORY OF PRESENT ILLNESS   (Location/Symptom, Timing/Onset, Context/Setting,Quality, Duration, Modifying Factors, Severity)  Note limiting factors. Kyaw Amin is a77 y.o. female who presents to the emergency department with complaints of worsening shortness of breath and cough for the past couple of days. Patient reports that she was being seen in urgent care when they told her that her oxygen levels were very low. She states that she was brought here by EMS because she needed oxygen. She reports a history of pneumonia last year as well as COVID-19. States she got so sick that she did require mechanical ventilation. She denies any chest pain at this time denies any fever or chills. Does report that her cough is productive. She has no other complaints at this time. HPI    Nursing Notes werereviewed. REVIEW OF SYSTEMS    (2-9 systems for level 4, 10 or more for level 5)     Review of Systems   Constitutional: Negative for chills, diaphoresis and fever. HENT: Negative for congestion, ear pain, rhinorrhea and sore throat. Eyes: Negative for discharge, redness and visual disturbance. Respiratory: Positive for cough, shortness of breath and wheezing. Negative for chest tightness. Cardiovascular: Negative for chest pain and palpitations. Gastrointestinal: Negative for abdominal pain, blood in stool, constipation, diarrhea, nausea and vomiting. Endocrine: Negative for polydipsia, polyphagia and polyuria. Genitourinary: Negative for decreased urine volume, difficulty urinating, dysuria, frequency and hematuria. Musculoskeletal: Negative for arthralgias, back pain and myalgias. Skin: Negative for pallor and rash. Allergic/Immunologic: Negative for food allergies and immunocompromised state. Neurological: Negative for dizziness, syncope, weakness and light-headedness. Hematological: Negative for adenopathy. Does not bruise/bleed easily. Psychiatric/Behavioral: Negative for behavioral problems and suicidal ideas. The patient is not nervous/anxious. Except as noted above the remainder of the review of systems was reviewed and negative.        PAST MEDICAL HISTORY     Past Medical History:   Diagnosis Date    COPD (chronic obstructive pulmonary disease) ([address]     Depression     GERD (gastroesophageal reflux disease)     Osteoporosis     Pneumonia          SURGICALHISTORY       Past Surgical History:   Procedure Laterality Date    BACK SURGERY      BREAST SURGERY      CARPAL TUNNEL RELEASE      FRACTURE SURGERY Left 12/20/2018    ORIF wrist    HYSTERECTOMY      JOINT REPLACEMENT      right knee    JOINT REPLACEMENT      WRIST FRACTURE SURGERY Left 12/20/2018    WRIST OPEN REDUCTION INTERNAL FIXATION-DISTAL RADIUS performed by Marco A Barba MD at [address] [facility]       Previous Medications    ALBUTEROL SULFATE  (90 BASE) MCG/ACT INHALER    Inhale 2 puffs into the lungs 4 times daily    ALENDRONATE (FOSAMAX) 70 MG TABLET    Take 70 mg by mouth every 7 days    ASCORBIC ACID (VITAMIN C) 1000 MG TABLET    Take 1 tablet by mouth 2 times daily for 7 days    ASPIRIN 81 MG TABLET    Take 81 mg by mouth daily    CALCIUM CITRATE-VITAMIN D (CITRICAL + D) 315-250 MG-UNIT TABS PER TABLET    Take 1 tablet by mouth 2 times daily (with meals)    DULOXETINE (CYMBALTA) 60 MG EXTENDED RELEASE CAPSULE    Take 60 mg by mouth daily    FUROSEMIDE (LASIX) 20 MG TABLET    Take 1 tablet by mouth daily    HYDROCODONE-ACETAMINOPHEN (NORCO)  MG PER TABLET    Take 1 tablet by mouth every 6 hours as needed for Pain. HYDROXYCHLOROQUINE SULFATE (PLAQUENIL PO)    Take by mouth daily     LEVOTHYROXINE (SYNTHROID) 150 MCG TABLET    Take 150 mcg by mouth Daily    METOPROLOL TARTRATE (LOPRESSOR) 25 MG TABLET    Take 1 tablet by mouth 2 times daily    PANTOPRAZOLE SODIUM (PROTONIX) 40 MG PACK PACKET    Take 40 mg by mouth 2 times daily (before meals)    POTASSIUM CHLORIDE (KLOR-CON) 20 MEQ PACKET    Take 20 mEq by mouth 2 times daily    PREGABALIN (LYRICA) 300 MG CAPSULE    Take 1 capsule by mouth 2 times daily for 30 days. RIVAROXABAN (XARELTO) 20 MG TABS TABLET    Take 1 tablet by mouth daily (with breakfast)    TRAZODONE (DESYREL) 100 MG TABLET    Take 200 mg by mouth nightly     VITAMIN D (CHOLECALCIFEROL) 50 MCG (2000) TABS TABLET    Take 1 tablet by mouth daily for 7 days         ALLERGIES   Patient has no known allergies. FAMILY HISTORY       Family History   Problem Relation Age of Onset    Heart Attack Father 61    Heart Attack Sister     Heart Disease Brother           SOCIAL HISTORY       Social History     Socioeconomic History    Marital status:      Spouse name: None    Number of children: None    Years of education: None    Highest education level: None   Occupational History    None   Tobacco Use    Smoking status: Former Smoker     Packs/day: 1.00     Years: 10.00     Pack years: 10.00     Quit date: 1976     Years since quittin.5    Smokeless tobacco: Never Used   Vaping Use    Vaping Use: Never used   Substance and Sexual Activity    Alcohol use: No    Drug use: No    Sexual activity: None   Other Topics Concern    None   Social History Narrative    None     Social Determinants of Health     Financial Resource Strain:     Difficulty of Paying Living Expenses:    Food Insecurity:     Worried About Running Out of Food in the Last Year:     Ran Out of Food in the Last Year:    Transportation Needs:     Lack of Transportation (Medical):      Lack of Transportation (Non-Medical):    Physical Activity:     Days of Exercise per Week:     Minutes of Exercise per Session:    Stress:     Feeling of Stress :    Social Connections:     Frequency of Communication with Friends and Family:     Frequency of Social Gatherings with Friends and Family:     Attends Latter day Services:     Active Member of Clubs or Organizations:     Attends Club or Organization Meetings:     Marital Status:    Intimate Partner Violence:     Fear of Current or Ex-Partner:     Emotionally Abused:     Physically Abused:     Sexually Abused:        SCREENINGS    Kristy Coma Scale  Eye Opening: Spontaneous  Best Verbal Response: Oriented  Best Motor Response: Obeys commands  Kristy Coma Scale Score: 15        PHYSICAL EXAM    (up to 7 for level 4, 8 or more for level 5)     ED Triage Vitals [05/20/21 1803]   BP Temp Temp Source Pulse Resp SpO2 Height Weight   (!) 132/94 97.8 °F (36.6 °C) Temporal 97 18 97 % 5' 4\" (1.626 m) 147 lb (66.7 kg)       Physical Exam  Vitals and nursing note reviewed. Constitutional:       General: She is in acute distress. Appearance: She is well-developed. She is not diaphoretic. HENT:      Head: Normocephalic and atraumatic. Right Ear: External ear normal.      Left Ear: External ear normal.      Mouth/Throat:      Pharynx: No oropharyngeal exudate. Eyes:      General: No scleral icterus. Right eye: No discharge. Left eye: No discharge. Extraocular Movements: Extraocular movements intact. Conjunctiva/sclera: Conjunctivae normal.      Pupils: Pupils are equal, round, and reactive to light. Neck:      Thyroid: No thyromegaly. Trachea: No tracheal deviation. Cardiovascular:      Rate and Rhythm: Normal rate and regular rhythm. Heart sounds: No murmur heard. No friction rub. No gallop. Pulmonary:      Effort: Tachypnea and respiratory distress present. Breath sounds: No stridor.  Decreased breath sounds, wheezing and rhonchi present. Abdominal:      General: Bowel sounds are normal. There is no distension. Palpations: Abdomen is soft. Tenderness: There is no guarding or rebound. Musculoskeletal:         General: No tenderness or deformity. Normal range of motion. Cervical back: Normal range of motion and neck supple. Lymphadenopathy:      Cervical: No cervical adenopathy. Skin:     General: Skin is warm and dry. Capillary Refill: Capillary refill takes less than 2 seconds. Coloration: Skin is not cyanotic. Findings: No erythema or rash. Neurological:      Mental Status: She is alert and oriented to person, place, and time. Cranial Nerves: No cranial nerve deficit. Motor: No abnormal muscle tone. Deep Tendon Reflexes: Reflexes are normal and symmetric. Reflexes normal.   Psychiatric:         Behavior: Behavior normal.         DIAGNOSTIC RESULTS     EKG: All EKG's are interpreted by the Emergency Department Physician who either signs orCo-signs this chart in the absence of a cardiologist.      RADIOLOGY:   Non-plainfilm images such as CT, Ultrasound and MRI are read by the radiologist. Plain radiographic images are visualized and preliminarily interpreted by the emergency physician with the below findings:      Interpretationper the Radiologist below, if available at the time of this note:    CT CHEST PULMONARY EMBOLISM W CONTRAST   Final Result   1. No pulmonary embolus. 2. There are areas of consolidation in the right upper and lower lobes with   ground-glass opacity in the left upper lobe. Given the extensive pneumonia   seen on prior CT, this could represent residual consolidation/scarring. However, acute infection could have a similar appearance. Clinical   correlation is recommended. 3. Mediastinal lymphadenopathy, which appears improved in the interval.   4. 7 mm noncalcified right lower lobe nodule.       RECOMMENDATIONS:   7 mm right 05/20/21 1930 05/20/21 1945 05/20/21 2000 05/20/21 2126   BP: (!) 119/48 (!) 132/48 (!) 111/45    Pulse: 84 84 75    Resp: 11 11 11    Temp:       TempSrc:       SpO2: 96% 97% 97% 97%   Weight:       Height:             MDM  71-year-old female who presents secondary to shortness of breath was found to be hypoxic at urgent care and sent by EMS to the ER. On 3 L of nasal cannula she was satting 96%. At this point concern for acute pneumonia versus CHF versus PE. Patient has a history of COVID-19 as well as extensive pneumonia where she did require intubation last year. At this point will start IV Solu-Medrol breathing treatments. Repeat breathing treatments patient is feeling much better. She is hypoxic when we try to wean the oxygen. Still awaiting CT chest.    CT chest is showing pneumonia in the right side of lungs which could be residual or new. Given her hypoxia her productive cough I am going to treat this for a pneumonia. She will be admitted to the hospital given her hypoxia. I called and spoke with Dr. Venita Duffy, hospitalist on-call who is aware of patient's presentation work-up in the ER agrees to admit for further evaluation and treatment. Patient is up-to-date on plan admit all questions have been answered. Procedures    FINAL IMPRESSION      1. Acute respiratory distress    2. Community acquired pneumonia of right lung, unspecified part of lung        DISPOSITION/PLAN   DISPOSITION Decision To Admit 05/20/2021 10:29:11 PM      PATIENT REFERRED TO:  No follow-up provider specified.     DISCHARGE MEDICATIONS:  New Prescriptions    No medications on file              Summation      Patient Course:      ED Medications administered this visit:    Medications   ipratropium-albuterol (DUONEB) 0.5-2.5 (3) MG/3ML nebulizer solution (has no administration in time range)   budesonide (PULMICORT) 0.5 MG/2ML nebulizer suspension (has no administration in time range)   cefTRIAXone (ROCEPHIN) 1,000 mg in sterile water 10 mL IV syringe (has no administration in time range)   azithromycin (ZITHROMAX) 500 mg in D5W 250ml addavial (has no administration in time range)   methylPREDNISolone sodium (SOLU-MEDROL) injection 125 mg (125 mg Intravenous Given 5/20/21 1857)   ipratropium-albuterol (DUONEB) nebulizer solution 1 ampule (1 ampule Inhalation Given 5/20/21 1829)   budesonide (PULMICORT) nebulizer suspension 500 mcg (500 mcg Nebulization Given 5/20/21 1824)   ipratropium-albuterol (DUONEB) nebulizer solution 1 ampule (1 ampule Inhalation Given 5/20/21 2123)   iopamidol (ISOVUE-370) 76 % injection 75 mL (75 mLs Intravenous Given 5/20/21 2050)       New Prescriptions from this visit:    New Prescriptions    No medications on file       Follow-up:  No follow-up provider specified. Final Impression:   1. Acute respiratory distress    2.  Community acquired pneumonia of right lung, unspecified part of lung               (Please note that portions of this note were completed with a voice recognition program.  Efforts were made to edit the dictations but occasionally words are mis-transcribed.)           Sally Bailey PA-C  05/20/21 0064

## 2021-05-21 NOTE — PROGRESS NOTES
Speech Language Pathology  Facility/Department: [facility] AT THE [facility] MED SURG   CLINICAL BEDSIDE SWALLOW EVALUATION    NAME: Nirmal Monique  : 1945  MRN: [id number]    ADMISSION DATE: 2021  ADMITTING DIAGNOSIS: has Pneumonia of both lungs due to infectious organism; Acute metabolic encephalopathy; Hypothyroidism; Major depressive disorder; Chronic midline low back pain without sciatica; Iron deficiency anemia; Status post surgery; Sepsis due to pneumonia ([address] Lactic acidosis; Septic shock ([address] Severe sepsis ([address] COPD exacerbation ([address] Acute respiratory failure with hypoxia and hypercapnia ([address] Encephalopathy; Sepsis with acute hypoxic respiratory failure and septic shock ([address] Biventricular congestive heart failure ([address] Pneumonia due to COVID-19 virus; and Community acquired bacterial pneumonia on their problem list.  ONSET DATE:  21    Recent Chest Xray/CT of Chest: (21)  Impression   1. No pulmonary embolus. 2. There are areas of consolidation in the right upper and lower lobes with   ground-glass opacity in the left upper lobe.  Given the extensive pneumonia   seen on prior CT, this could represent residual consolidation/scarring. However, acute infection could have a similar appearance.  Clinical   correlation is recommended. 3. Mediastinal lymphadenopathy, which appears improved in the interval.   4. 7 mm noncalcified right lower lobe nodule.       RECOMMENDATIONS:   7 mm right solid pulmonary nodule. Recommend a non-contrast Chest CT at 6-12   months. If patient is high risk for malignancy, recommend an additional   non-contrast Chest CT at 18-24 months; if patient is low risk for malignancy   a non-contrast Chest CT at 18-24 months is optional.   These guidelines do not apply to immunocompromised patients and patients with   cancer. Follow up in patients with significant comorbidities as clinically   warranted. For lung cancer screening, adhere to Lung-RADS guidelines. Reference: Radiology. 2017; 284(1):228-14.           Date of Eval: 5/21/2021  Evaluating Therapist: HILLARY Salgado    Current Diet level:  Current Diet : Regular  Current Liquid Diet : Thin      Primary Complaint  Patient Complaint: Patient reports no difficulties with swallowing at this time. Pain:  Pain Assessment  Pain Assessment: 0-10  Pain Level: 0      Reason for Referral  Sugar Núñez was referred for a bedside swallow evaluation to assess the efficiency of her swallow function, identify signs and symptoms of aspiration and make recommendations regarding safe dietary consistencies, effective compensatory strategies, and safe eating environment. Impression  Dysphagia Diagnosis: Swallow function appears grossly intact  Dysphagia Outcome Severity Scale: Level 6: Within functional limits/Modified independence     Treatment Plan  Requires SLP Intervention: No  Duration/Frequency of Treatment: N/A          Recommended Diet and Intervention  Diet Solids Recommendation: Regular     Recommended Form of Meds: PO          Compensatory Swallowing Strategies  Compensatory Swallowing Strategies: Alternate solids and liquids;Upright as possible for all oral intake;Eat/Feed slowly; Small bites/sips    General  Chart Reviewed: Yes  Behavior/Cognition: Alert; Cooperative  Respiratory Status: Room air  Communication Observation: Functional  Follows Directions: Simple  Dentition: Dentures bottom; Dentures top  Patient Positioning: Upright in chair  Baseline Vocal Quality: Normal  Volitional Cough: Strong  Consistencies Administered: Reg solid; Dysphagia Soft and Bite-Sized (Dysphagia III); Dysphagia Pureed (Dysphagia I); Thin - straw; Thin - cup      Pain Level: 0    Vision/Hearing  Hearing  Hearing: Within functional limits    Oral Motor Deficits       Oral Phase Dysfunction  Oral Phase  Oral Phase: WFL  Oral Phase  Oral Phase - Comment: Patient presents with WFL oral phase of swallow characterized by adequate bolus preparation/propulsion, adequate strength and ROM of labial and lingual musculature, and no oral residues present post-swallow. Indicators of Pharyngeal Phase Dysfunction   Pharyngeal Phase  Pharyngeal Phase: WFL  Pharyngeal Phase   Pharyngeal: Patient presents with suspected WFL pharyngeal phase of swallow characterized by prompt swallow initiation, adequate laryngeal elevation upon palpation, and no overt s/sx of aspiration/penetration with any consistency trialed. Prognosis  Prognosis  Prognosis for safe diet advancement: good  Individuals consulted  Consulted and agree with results and recommendations: Patient    Education  Patient Education: ST educated patient re: rationale for BSE, diet recommendations, and plan of care. Patient Education Response: Verbalizes understanding             Therapy Time  SLP Individual Minutes  Time In: 6250  Time Out: 2581  Minutes: 15        ST completed bedside swallow evaluation. Patient admitted 5/20/21 due to acute respiratory distress and community acquired pneumonia. Patient reports no swallowing difficulty at this time. [facility] of swallow characterized by adequate bolus preparation/propulsion, adequate strength and ROM of labial and lingual musculature, and no oral residues present post-swallow. Patient presents with suspected WFL pharyngeal phase of swallow characterized by prompt swallow initiation, adequate laryngeal elevation upon palpation, and no overt s/sx of aspiration/penetration with any consistency trialed. ST recommends continued diet of regular solids and thin liquids. Compensatory strategies should include: small bites/sips, pacing/slow rate, and remain upright during all oral intake. Further dysphagia therapy not warranted at this time.      Aleksey Sanderson  5/21/2021 9:08 AM

## 2021-06-01 ENCOUNTER — HOSPITAL ENCOUNTER (OUTPATIENT)
Age: 76
Discharge: HOME | End: 2021-06-03
Payer: MEDICARE

## 2021-06-01 ENCOUNTER — HOSPITAL ENCOUNTER (OUTPATIENT)
Dept: GENERAL RADIOLOGY | Age: 76
Discharge: HOME | End: 2021-06-03
Payer: MEDICARE

## 2021-06-01 DIAGNOSIS — J69.0 ASPIRATION PNEUMONIA, UNSPECIFIED ASPIRATION PNEUMONIA TYPE, UNSPECIFIED LATERALITY, UNSPECIFIED PART OF LUNG (HCC): ICD-10-CM

## 2021-06-01 PROCEDURE — 71046 X-RAY EXAM CHEST 2 VIEWS: CPT

## 2021-09-28 NOTE — PLAN OF CARE
Problem: Airway Clearance - Ineffective  Goal: Achieve or maintain patent airway  Outcome: Ongoing     Problem: Gas Exchange - Impaired  Goal: Absence of hypoxia  Outcome: Ongoing  Goal: Promote optimal lung function  Outcome: Ongoing     Problem: Breathing Pattern - Ineffective  Goal: Ability to achieve and maintain a regular respiratory rate  Outcome: Ongoing     Problem:  Body Temperature -  Risk of, Imbalanced  Goal: Ability to maintain a body temperature within defined limits  Outcome: Ongoing  Goal: Will regain or maintain usual level of consciousness  Outcome: Ongoing  Goal: Complications related to the disease process, condition or treatment will be avoided or minimized  Outcome: Ongoing     Problem: Isolation Precautions - Risk of Spread of Infection  Goal: Prevent transmission of infection  Outcome: Ongoing     Problem: Nutrition Deficits  Goal: Optimize nutrtional status  Outcome: Ongoing     Problem: Risk for Fluid Volume Deficit  Goal: Maintain normal heart rhythm  Outcome: Ongoing  Goal: Maintain absence of muscle cramping  Outcome: Ongoing  Goal: Maintain normal serum potassium, sodium, calcium, phosphorus, and pH  Outcome: Ongoing     Problem: Loneliness or Risk for Loneliness  Goal: Demonstrate positive use of time alone when socialization is not possible  Outcome: Ongoing     Problem: Fatigue  Goal: Verbalize increase energy and improved vitality  Outcome: Ongoing     Problem: Patient Education: Go to Patient Education Activity  Goal: Patient/Family Education  Outcome: Ongoing     Problem: Falls - Risk of:  Goal: Will remain free from falls  Description: Will remain free from falls  Outcome: Ongoing  Goal: Absence of physical injury  Description: Absence of physical injury  Outcome: Ongoing Acitretin Counseling:  I discussed with the patient the risks of acitretin including but not limited to hair loss, dry lips/skin/eyes, liver damage, hyperlipidemia, depression/suicidal ideation, photosensitivity.  Serious rare side effects can include but are not limited to pancreatitis, pseudotumor cerebri, bony changes, clot formation/stroke/heart attack.  Patient understands that alcohol is contraindicated since it can result in liver toxicity and significantly prolong the elimination of the drug by many years.

## 2021-10-14 ENCOUNTER — HOSPITAL ENCOUNTER (OUTPATIENT)
Age: 76
Setting detail: OBSERVATION
LOS: 1 days | Discharge: HOME | End: 2021-10-16
Attending: EMERGENCY MEDICINE | Admitting: INTERNAL MEDICINE
Payer: MEDICARE

## 2021-10-14 ENCOUNTER — APPOINTMENT (OUTPATIENT)
Dept: CT IMAGING | Age: 76
End: 2021-10-14
Payer: MEDICARE

## 2021-10-14 DIAGNOSIS — D64.9 ANEMIA, UNSPECIFIED TYPE: Primary | ICD-10-CM

## 2021-10-14 LAB
-: NORMAL
ABSOLUTE EOS #: <0.03 K/UL (ref 0–0.44)
ABSOLUTE IMMATURE GRANULOCYTE: 0.04 K/UL (ref 0–0.3)
ABSOLUTE LYMPH #: 1.01 K/UL (ref 1.1–3.7)
ABSOLUTE MONO #: 0.71 K/UL (ref 0.1–1.2)
ALBUMIN SERPL-MCNC: 4.2 G/DL (ref 3.5–5.2)
ALBUMIN/GLOBULIN RATIO: 1.5 (ref 1–2.5)
ALP BLD-CCNC: 56 U/L (ref 35–104)
ALT SERPL-CCNC: 13 U/L (ref 5–33)
AMORPHOUS: NORMAL
ANION GAP SERPL CALCULATED.3IONS-SCNC: 11 MMOL/L (ref 9–17)
AST SERPL-CCNC: 14 U/L
BACTERIA: NORMAL
BASOPHILS # BLD: 0 % (ref 0–2)
BASOPHILS ABSOLUTE: <0.03 K/UL (ref 0–0.2)
BILIRUB SERPL-MCNC: <0.1 MG/DL (ref 0.3–1.2)
BILIRUBIN URINE: NEGATIVE
BUN BLDV-MCNC: 14 MG/DL (ref 8–23)
BUN/CREAT BLD: 23 (ref 9–20)
CALCIUM SERPL-MCNC: 8.9 MG/DL (ref 8.6–10.4)
CASTS UA: NORMAL /LPF
CHLORIDE BLD-SCNC: 103 MMOL/L (ref 98–107)
CO2: 26 MMOL/L (ref 20–31)
COLOR: YELLOW
COMMENT UA: NORMAL
CREAT SERPL-MCNC: 0.62 MG/DL (ref 0.5–0.9)
CRYSTALS, UA: NORMAL /HPF
DIFFERENTIAL TYPE: ABNORMAL
EOSINOPHILS RELATIVE PERCENT: 0 % (ref 1–4)
EPITHELIAL CELLS UA: NORMAL /HPF (ref 0–25)
GFR AFRICAN AMERICAN: >60 ML/MIN
GFR NON-AFRICAN AMERICAN: >60 ML/MIN
GFR SERPL CREATININE-BSD FRML MDRD: ABNORMAL ML/MIN/{1.73_M2}
GFR SERPL CREATININE-BSD FRML MDRD: ABNORMAL ML/MIN/{1.73_M2}
GLUCOSE BLD-MCNC: 113 MG/DL (ref 70–99)
GLUCOSE URINE: NEGATIVE
HCT VFR BLD CALC: 27.2 % (ref 36.3–47.1)
HEMOGLOBIN: 7.9 G/DL (ref 11.9–15.1)
IMMATURE GRANULOCYTES: 0 %
INR BLD: 1.3
KETONES, URINE: NEGATIVE
LEUKOCYTE ESTERASE, URINE: NEGATIVE
LYMPHOCYTES # BLD: 8 % (ref 24–43)
MCH RBC QN AUTO: 21.6 PG (ref 25.2–33.5)
MCHC RBC AUTO-ENTMCNC: 29 G/DL (ref 28.4–34.8)
MCV RBC AUTO: 74.3 FL (ref 82.6–102.9)
MONOCYTES # BLD: 6 % (ref 3–12)
MUCUS: NORMAL
NITRITE, URINE: NEGATIVE
NRBC AUTOMATED: 0 PER 100 WBC
OTHER OBSERVATIONS UA: NORMAL
PARTIAL THROMBOPLASTIN TIME: 33.9 SEC (ref 23.9–33.8)
PDW BLD-RTO: 18.9 % (ref 11.8–14.4)
PH UA: 6 (ref 5–9)
PLATELET # BLD: 226 K/UL (ref 138–453)
PLATELET ESTIMATE: ABNORMAL
PMV BLD AUTO: 10.1 FL (ref 8.1–13.5)
POTASSIUM SERPL-SCNC: 3.7 MMOL/L (ref 3.7–5.3)
PROTEIN UA: NEGATIVE
PROTHROMBIN TIME: 15.6 SEC (ref 11.5–14.2)
RBC # BLD: 3.66 M/UL (ref 3.95–5.11)
RBC # BLD: ABNORMAL 10*6/UL
RBC UA: NORMAL /HPF (ref 0–2)
RENAL EPITHELIAL, UA: NORMAL /HPF
SEG NEUTROPHILS: 86 % (ref 36–65)
SEGMENTED NEUTROPHILS ABSOLUTE COUNT: 10.51 K/UL (ref 1.5–8.1)
SODIUM BLD-SCNC: 140 MMOL/L (ref 135–144)
SPECIFIC GRAVITY UA: 1.02 (ref 1.01–1.02)
TOTAL PROTEIN: 7 G/DL (ref 6.4–8.3)
TRICHOMONAS: NORMAL
TURBIDITY: CLEAR
URINE HGB: NEGATIVE
UROBILINOGEN, URINE: NORMAL
WBC # BLD: 12.3 K/UL (ref 3.5–11.3)
WBC # BLD: ABNORMAL 10*3/UL
WBC UA: NORMAL /HPF (ref 0–5)
YEAST: NORMAL

## 2021-10-14 PROCEDURE — 80053 COMPREHEN METABOLIC PANEL: CPT

## 2021-10-14 PROCEDURE — 36430 TRANSFUSION BLD/BLD COMPNT: CPT

## 2021-10-14 PROCEDURE — G0378 HOSPITAL OBSERVATION PER HR: HCPCS

## 2021-10-14 PROCEDURE — 86850 RBC ANTIBODY SCREEN: CPT

## 2021-10-14 PROCEDURE — 99283 EMERGENCY DEPT VISIT LOW MDM: CPT

## 2021-10-14 PROCEDURE — 85610 PROTHROMBIN TIME: CPT

## 2021-10-14 PROCEDURE — 36415 COLL VENOUS BLD VENIPUNCTURE: CPT

## 2021-10-14 PROCEDURE — 86900 BLOOD TYPING SEROLOGIC ABO: CPT

## 2021-10-14 PROCEDURE — 74174 CTA ABD&PLVS W/CONTRAST: CPT

## 2021-10-14 PROCEDURE — 6360000004 HC RX CONTRAST MEDICATION: Performed by: PHYSICIAN ASSISTANT

## 2021-10-14 PROCEDURE — 86901 BLOOD TYPING SEROLOGIC RH(D): CPT

## 2021-10-14 PROCEDURE — P9016 RBC LEUKOCYTES REDUCED: HCPCS

## 2021-10-14 PROCEDURE — 85025 COMPLETE CBC W/AUTO DIFF WBC: CPT

## 2021-10-14 PROCEDURE — 81001 URINALYSIS AUTO W/SCOPE: CPT

## 2021-10-14 PROCEDURE — 85730 THROMBOPLASTIN TIME PARTIAL: CPT

## 2021-10-14 RX ORDER — PANTOPRAZOLE SODIUM 40 MG/10ML
40 INJECTION, POWDER, LYOPHILIZED, FOR SOLUTION INTRAVENOUS EVERY 12 HOURS
Status: DISCONTINUED | OUTPATIENT
Start: 2021-10-14 | End: 2021-10-15

## 2021-10-14 RX ORDER — ONDANSETRON 4 MG/1
4 TABLET, ORALLY DISINTEGRATING ORAL EVERY 8 HOURS PRN
Status: DISCONTINUED | OUTPATIENT
Start: 2021-10-14 | End: 2021-10-16 | Stop reason: HOSPADM

## 2021-10-14 RX ORDER — SODIUM CHLORIDE 9 MG/ML
25 INJECTION, SOLUTION INTRAVENOUS PRN
Status: DISCONTINUED | OUTPATIENT
Start: 2021-10-14 | End: 2021-10-16 | Stop reason: HOSPADM

## 2021-10-14 RX ORDER — ALBUTEROL SULFATE 90 UG/1
2 AEROSOL, METERED RESPIRATORY (INHALATION) 4 TIMES DAILY
Status: DISCONTINUED | OUTPATIENT
Start: 2021-10-15 | End: 2021-10-16 | Stop reason: HOSPADM

## 2021-10-14 RX ORDER — SODIUM CHLORIDE 9 MG/ML
INJECTION, SOLUTION INTRAVENOUS PRN
Status: DISCONTINUED | OUTPATIENT
Start: 2021-10-14 | End: 2021-10-16 | Stop reason: HOSPADM

## 2021-10-14 RX ORDER — SODIUM CHLORIDE 0.9 % (FLUSH) 0.9 %
5-40 SYRINGE (ML) INJECTION EVERY 12 HOURS SCHEDULED
Status: DISCONTINUED | OUTPATIENT
Start: 2021-10-14 | End: 2021-10-16 | Stop reason: HOSPADM

## 2021-10-14 RX ORDER — ALBUTEROL SULFATE 90 UG/1
2 AEROSOL, METERED RESPIRATORY (INHALATION) 4 TIMES DAILY
Status: DISCONTINUED | OUTPATIENT
Start: 2021-10-14 | End: 2021-10-14

## 2021-10-14 RX ORDER — SODIUM CHLORIDE 9 MG/ML
INJECTION, SOLUTION INTRAVENOUS CONTINUOUS
Status: DISCONTINUED | OUTPATIENT
Start: 2021-10-14 | End: 2021-10-16 | Stop reason: HOSPADM

## 2021-10-14 RX ORDER — SODIUM CHLORIDE 0.9 % (FLUSH) 0.9 %
5-40 SYRINGE (ML) INJECTION PRN
Status: DISCONTINUED | OUTPATIENT
Start: 2021-10-14 | End: 2021-10-16 | Stop reason: HOSPADM

## 2021-10-14 RX ORDER — SODIUM CHLORIDE 9 MG/ML
10 INJECTION INTRAVENOUS EVERY 12 HOURS
Status: DISCONTINUED | OUTPATIENT
Start: 2021-10-14 | End: 2021-10-15

## 2021-10-14 RX ORDER — ONDANSETRON 2 MG/ML
4 INJECTION INTRAMUSCULAR; INTRAVENOUS EVERY 6 HOURS PRN
Status: DISCONTINUED | OUTPATIENT
Start: 2021-10-14 | End: 2021-10-16 | Stop reason: HOSPADM

## 2021-10-14 RX ORDER — ACETAMINOPHEN 325 MG/1
650 TABLET ORAL EVERY 6 HOURS PRN
Status: DISCONTINUED | OUTPATIENT
Start: 2021-10-14 | End: 2021-10-16 | Stop reason: HOSPADM

## 2021-10-14 RX ORDER — ACETAMINOPHEN 650 MG/1
650 SUPPOSITORY RECTAL EVERY 6 HOURS PRN
Status: DISCONTINUED | OUTPATIENT
Start: 2021-10-14 | End: 2021-10-16 | Stop reason: HOSPADM

## 2021-10-14 RX ADMIN — IOPAMIDOL 75 ML: 755 INJECTION, SOLUTION INTRAVENOUS at 19:45

## 2021-10-14 ASSESSMENT — ENCOUNTER SYMPTOMS
EYES NEGATIVE: 1
GASTROINTESTINAL NEGATIVE: 1
RESPIRATORY NEGATIVE: 1

## 2021-10-14 ASSESSMENT — PAIN SCALES - GENERAL: PAINLEVEL_OUTOF10: 0

## 2021-10-15 PROBLEM — R65.20 SEVERE SEPSIS (HCC): Status: RESOLVED | Noted: 2020-10-22 | Resolved: 2021-10-15

## 2021-10-15 PROBLEM — J18.9 SEPSIS DUE TO PNEUMONIA (HCC): Status: RESOLVED | Noted: 2020-08-16 | Resolved: 2021-10-15

## 2021-10-15 PROBLEM — J44.1 COPD EXACERBATION (HCC): Status: RESOLVED | Noted: 2020-10-22 | Resolved: 2021-10-15

## 2021-10-15 PROBLEM — R65.21 SEPTIC SHOCK (HCC): Status: RESOLVED | Noted: 2020-08-16 | Resolved: 2021-10-15

## 2021-10-15 PROBLEM — A41.9 SEPTIC SHOCK (HCC): Status: RESOLVED | Noted: 2020-08-16 | Resolved: 2021-10-15

## 2021-10-15 PROBLEM — E87.20 LACTIC ACIDOSIS: Status: RESOLVED | Noted: 2020-08-16 | Resolved: 2021-10-15

## 2021-10-15 PROBLEM — J15.9 COMMUNITY ACQUIRED BACTERIAL PNEUMONIA: Status: RESOLVED | Noted: 2021-05-20 | Resolved: 2021-10-15

## 2021-10-15 PROBLEM — J12.82 PNEUMONIA DUE TO COVID-19 VIRUS: Status: RESOLVED | Noted: 2020-12-26 | Resolved: 2021-10-15

## 2021-10-15 PROBLEM — A41.9 SEPSIS DUE TO PNEUMONIA (HCC): Status: RESOLVED | Noted: 2020-08-16 | Resolved: 2021-10-15

## 2021-10-15 PROBLEM — Z98.890 STATUS POST SURGERY: Status: RESOLVED | Noted: 2018-12-20 | Resolved: 2021-10-15

## 2021-10-15 PROBLEM — A41.9 SEVERE SEPSIS (HCC): Status: RESOLVED | Noted: 2020-10-22 | Resolved: 2021-10-15

## 2021-10-15 PROBLEM — U07.1 PNEUMONIA DUE TO COVID-19 VIRUS: Status: RESOLVED | Noted: 2020-12-26 | Resolved: 2021-10-15

## 2021-10-15 PROBLEM — G93.41 ACUTE METABOLIC ENCEPHALOPATHY: Status: RESOLVED | Noted: 2018-10-05 | Resolved: 2021-10-15

## 2021-10-15 PROBLEM — J18.9 PNEUMONIA OF BOTH LUNGS DUE TO INFECTIOUS ORGANISM: Status: RESOLVED | Noted: 2018-10-05 | Resolved: 2021-10-15

## 2021-10-15 LAB
ABSOLUTE EOS #: 0.15 K/UL (ref 0–0.44)
ABSOLUTE IMMATURE GRANULOCYTE: 0 K/UL (ref 0–0.3)
ABSOLUTE LYMPH #: 2.05 K/UL (ref 1.1–3.7)
ABSOLUTE MONO #: 0.76 K/UL (ref 0.1–1.2)
ANION GAP SERPL CALCULATED.3IONS-SCNC: 10 MMOL/L (ref 9–17)
BASOPHILS # BLD: 1 % (ref 0–2)
BASOPHILS ABSOLUTE: 0.08 K/UL (ref 0–0.2)
BUN BLDV-MCNC: 11 MG/DL (ref 8–23)
BUN/CREAT BLD: 20 (ref 9–20)
CALCIUM SERPL-MCNC: 8.5 MG/DL (ref 8.6–10.4)
CHLORIDE BLD-SCNC: 104 MMOL/L (ref 98–107)
CO2: 28 MMOL/L (ref 20–31)
CREAT SERPL-MCNC: 0.55 MG/DL (ref 0.5–0.9)
DIFFERENTIAL TYPE: ABNORMAL
EKG ATRIAL RATE: 67 BPM
EKG P AXIS: 55 DEGREES
EKG P-R INTERVAL: 184 MS
EKG Q-T INTERVAL: 400 MS
EKG QRS DURATION: 88 MS
EKG QTC CALCULATION (BAZETT): 422 MS
EKG R AXIS: -8 DEGREES
EKG T AXIS: 28 DEGREES
EKG VENTRICULAR RATE: 67 BPM
EOSINOPHILS RELATIVE PERCENT: 2 % (ref 1–4)
FERRITIN: 18 UG/L (ref 13–150)
FOLATE: >20 NG/ML
GFR AFRICAN AMERICAN: >60 ML/MIN
GFR NON-AFRICAN AMERICAN: >60 ML/MIN
GFR SERPL CREATININE-BSD FRML MDRD: ABNORMAL ML/MIN/{1.73_M2}
GFR SERPL CREATININE-BSD FRML MDRD: ABNORMAL ML/MIN/{1.73_M2}
GLUCOSE BLD-MCNC: 94 MG/DL (ref 70–99)
HAPTOGLOBIN: 109 MG/DL (ref 30–200)
HCT VFR BLD CALC: 31 % (ref 36.3–47.1)
HCT VFR BLD CALC: 33.8 % (ref 36.3–47.1)
HEMOGLOBIN: 9.2 G/DL (ref 11.9–15.1)
HEMOGLOBIN: 9.9 G/DL (ref 11.9–15.1)
IMMATURE GRANULOCYTES: 0 %
IRON SATURATION: 28 % (ref 20–55)
IRON: 98 UG/DL (ref 37–145)
LYMPHOCYTES # BLD: 27 % (ref 24–43)
MAGNESIUM: 2.2 MG/DL (ref 1.6–2.6)
MCH RBC QN AUTO: 22.7 PG (ref 25.2–33.5)
MCHC RBC AUTO-ENTMCNC: 29.7 G/DL (ref 28.4–34.8)
MCV RBC AUTO: 76.4 FL (ref 82.6–102.9)
MONOCYTES # BLD: 10 % (ref 3–12)
MORPHOLOGY: ABNORMAL
MORPHOLOGY: ABNORMAL
NRBC AUTOMATED: 0 PER 100 WBC
PDW BLD-RTO: 20.3 % (ref 11.8–14.4)
PLATELET # BLD: 217 K/UL (ref 138–453)
PLATELET ESTIMATE: ABNORMAL
PMV BLD AUTO: 10.3 FL (ref 8.1–13.5)
POTASSIUM SERPL-SCNC: 3.5 MMOL/L (ref 3.7–5.3)
RBC # BLD: 4.06 M/UL (ref 3.95–5.11)
RBC # BLD: ABNORMAL 10*6/UL
SEG NEUTROPHILS: 60 % (ref 36–65)
SEGMENTED NEUTROPHILS ABSOLUTE COUNT: 4.56 K/UL (ref 1.5–8.1)
SODIUM BLD-SCNC: 142 MMOL/L (ref 135–144)
TOTAL IRON BINDING CAPACITY: 351 UG/DL (ref 250–450)
UNSATURATED IRON BINDING CAPACITY: 253 UG/DL (ref 112–347)
VITAMIN B-12: 258 PG/ML (ref 232–1245)
WBC # BLD: 7.6 K/UL (ref 3.5–11.3)
WBC # BLD: ABNORMAL 10*3/UL

## 2021-10-15 PROCEDURE — 82746 ASSAY OF FOLIC ACID SERUM: CPT

## 2021-10-15 PROCEDURE — 83550 IRON BINDING TEST: CPT

## 2021-10-15 PROCEDURE — 36415 COLL VENOUS BLD VENIPUNCTURE: CPT

## 2021-10-15 PROCEDURE — 6360000002 HC RX W HCPCS: Performed by: INTERNAL MEDICINE

## 2021-10-15 PROCEDURE — 6370000000 HC RX 637 (ALT 250 FOR IP): Performed by: NURSE PRACTITIONER

## 2021-10-15 PROCEDURE — 82607 VITAMIN B-12: CPT

## 2021-10-15 PROCEDURE — 82728 ASSAY OF FERRITIN: CPT

## 2021-10-15 PROCEDURE — 83540 ASSAY OF IRON: CPT

## 2021-10-15 PROCEDURE — G0378 HOSPITAL OBSERVATION PER HR: HCPCS

## 2021-10-15 PROCEDURE — 83010 ASSAY OF HAPTOGLOBIN QUANT: CPT

## 2021-10-15 PROCEDURE — 83735 ASSAY OF MAGNESIUM: CPT

## 2021-10-15 PROCEDURE — 96374 THER/PROPH/DIAG INJ IV PUSH: CPT

## 2021-10-15 PROCEDURE — 94761 N-INVAS EAR/PLS OXIMETRY MLT: CPT

## 2021-10-15 PROCEDURE — P9016 RBC LEUKOCYTES REDUCED: HCPCS

## 2021-10-15 PROCEDURE — 2580000003 HC RX 258: Performed by: INTERNAL MEDICINE

## 2021-10-15 PROCEDURE — 85014 HEMATOCRIT: CPT

## 2021-10-15 PROCEDURE — 80048 BASIC METABOLIC PNL TOTAL CA: CPT

## 2021-10-15 PROCEDURE — C9113 INJ PANTOPRAZOLE SODIUM, VIA: HCPCS | Performed by: INTERNAL MEDICINE

## 2021-10-15 PROCEDURE — 93005 ELECTROCARDIOGRAM TRACING: CPT | Performed by: INTERNAL MEDICINE

## 2021-10-15 PROCEDURE — 93010 ELECTROCARDIOGRAM REPORT: CPT | Performed by: INTERNAL MEDICINE

## 2021-10-15 PROCEDURE — 85025 COMPLETE CBC W/AUTO DIFF WBC: CPT

## 2021-10-15 PROCEDURE — 85018 HEMOGLOBIN: CPT

## 2021-10-15 RX ORDER — PANTOPRAZOLE SODIUM 40 MG/1
40 TABLET, DELAYED RELEASE ORAL
Status: DISCONTINUED | OUTPATIENT
Start: 2021-10-15 | End: 2021-10-16 | Stop reason: HOSPADM

## 2021-10-15 RX ORDER — DULOXETIN HYDROCHLORIDE 60 MG/1
60 CAPSULE, DELAYED RELEASE ORAL DAILY
Status: DISCONTINUED | OUTPATIENT
Start: 2021-10-15 | End: 2021-10-16 | Stop reason: HOSPADM

## 2021-10-15 RX ORDER — LEVOTHYROXINE SODIUM 0.15 MG/1
150 TABLET ORAL DAILY
Status: DISCONTINUED | OUTPATIENT
Start: 2021-10-15 | End: 2021-10-16 | Stop reason: HOSPADM

## 2021-10-15 RX ORDER — TRAZODONE HYDROCHLORIDE 50 MG/1
200 TABLET ORAL NIGHTLY
Status: DISCONTINUED | OUTPATIENT
Start: 2021-10-15 | End: 2021-10-16 | Stop reason: HOSPADM

## 2021-10-15 RX ORDER — FUROSEMIDE 20 MG/1
20 TABLET ORAL DAILY
Status: DISCONTINUED | OUTPATIENT
Start: 2021-10-15 | End: 2021-10-16 | Stop reason: HOSPADM

## 2021-10-15 RX ADMIN — SODIUM CHLORIDE 10 ML: 9 INJECTION INTRAMUSCULAR; INTRAVENOUS; SUBCUTANEOUS at 07:27

## 2021-10-15 RX ADMIN — TRAZODONE HYDROCHLORIDE 200 MG: 50 TABLET ORAL at 21:00

## 2021-10-15 RX ADMIN — METOPROLOL TARTRATE 25 MG: 25 TABLET, FILM COATED ORAL at 21:00

## 2021-10-15 RX ADMIN — SODIUM CHLORIDE: 9 INJECTION, SOLUTION INTRAVENOUS at 13:37

## 2021-10-15 RX ADMIN — LEVOTHYROXINE SODIUM 150 MCG: 150 TABLET ORAL at 12:16

## 2021-10-15 RX ADMIN — DULOXETINE HYDROCHLORIDE 60 MG: 60 CAPSULE, DELAYED RELEASE ORAL at 12:16

## 2021-10-15 RX ADMIN — FUROSEMIDE 20 MG: 20 TABLET ORAL at 12:16

## 2021-10-15 RX ADMIN — METOPROLOL TARTRATE 25 MG: 25 TABLET, FILM COATED ORAL at 12:16

## 2021-10-15 RX ADMIN — PANTOPRAZOLE SODIUM 40 MG: 40 INJECTION, POWDER, FOR SOLUTION INTRAVENOUS at 07:27

## 2021-10-15 RX ADMIN — SODIUM CHLORIDE: 9 INJECTION, SOLUTION INTRAVENOUS at 07:32

## 2021-10-15 RX ADMIN — SODIUM CHLORIDE: 9 INJECTION, SOLUTION INTRAVENOUS at 21:09

## 2021-10-15 RX ADMIN — PANTOPRAZOLE SODIUM 40 MG: 40 TABLET, DELAYED RELEASE ORAL at 17:11

## 2021-10-15 ASSESSMENT — PAIN DESCRIPTION - ORIENTATION
ORIENTATION: MID
ORIENTATION: MID

## 2021-10-15 ASSESSMENT — PAIN SCALES - GENERAL
PAINLEVEL_OUTOF10: 7
PAINLEVEL_OUTOF10: 0
PAINLEVEL_OUTOF10: 3

## 2021-10-15 ASSESSMENT — PAIN DESCRIPTION - PAIN TYPE
TYPE: ACUTE PAIN
TYPE: ACUTE PAIN

## 2021-10-15 ASSESSMENT — PAIN DESCRIPTION - FREQUENCY: FREQUENCY: CONTINUOUS

## 2021-10-15 ASSESSMENT — PAIN DESCRIPTION - PROGRESSION
CLINICAL_PROGRESSION: GRADUALLY WORSENING
CLINICAL_PROGRESSION: GRADUALLY IMPROVING

## 2021-10-15 ASSESSMENT — PAIN DESCRIPTION - LOCATION
LOCATION: HEAD
LOCATION: HEAD

## 2021-10-15 ASSESSMENT — PAIN DESCRIPTION - DESCRIPTORS
DESCRIPTORS: HEADACHE
DESCRIPTORS: HEADACHE

## 2021-10-15 ASSESSMENT — PAIN DESCRIPTION - ONSET: ONSET: UNABLE TO TELL

## 2021-10-15 NOTE — PROGRESS NOTES
Discussed discharge plans with the patient. Patient is a 77year old female here with Anemia, unspecified type. She is alert , oriented , pleasant , cooperative during our conversation.     Patient is  and lives at home with her . She has a walker, wheelchair, shower chair, and grabs at home if needed. . The patient does the cleaning and the  does the cooking. She is independent with her ADL's. Her  manages her  medications and her  also provides the transportation. She has no outside services in the home.     Her PCP is Christa Poole MD. She has medical insurance that helps with medication costs.     The discharge plan is home with no services at this time. She does not have advance directives and is not interested in doing them. ADAMARISW to monitor and assist with any needs or concerns as they arise.     BUZZ Vyas

## 2021-10-15 NOTE — ED PROVIDER NOTES
[facility] ED  EMERGENCY DEPARTMENT ENCOUNTER      Pt Name: Michael Diaz  MRN: [id number]  [place] 1945  Date of evaluation: 10/14/2021  Provider: CAROLINA Griffin PA-C    CHIEF COMPLAINT     Chief Complaint   Patient presents with    Abnormal Lab     Pt states she was sent by was sent in by PCP for possible blood transfusion. Bloodwork collected at [facility] today         HISTORY OF PRESENT ILLNESS   (Location/Symptom, Timing/Onset, Context/Setting,Quality, Duration, Modifying Factors, Severity)  Note limiting factors. Michael Diaz is a75 y.o. female who presents to the emergency department      81-year-old female with a history of anemia was sent here by her primary care physician. Patient had a low H&H today when she went to the doctor's office today. I spoke to the patient's physician who does not really familiar with her as her normal doctor is out of town. Patient does have a history of anemia with blood transfusions in the past per family history. Patient's had increased weakness over the last several days with dizziness. Hypertension and prostate cancer. Pretension and prostate cancer. Patient's past medical history includes COPD depression, GERD and pneumonia. She states she has had for blood transfusion in the past last one being a year ago September. Patient denies any fevers or chills. She does have a history of Covid in the past was also had both vaccines. Nursing Notes werereviewed. REVIEW OF SYSTEMS    (2-9 systems for level 4, 10 or more for level 5)     Review of Systems   Constitutional: Negative. HENT: Negative. Eyes: Negative. Respiratory: Negative. Cardiovascular: Negative. Gastrointestinal: Negative. Endocrine: Negative. Genitourinary: Negative. Musculoskeletal: Negative. Neurological: Negative. Psychiatric/Behavioral: Negative. All other systems reviewed and are negative.       Except as noted above the remainder of the review of systems was reviewed and negative. PAST MEDICAL HISTORY     Past Medical History:   Diagnosis Date    COPD (chronic obstructive pulmonary disease) ([address]     Depression     GERD (gastroesophageal reflux disease)     Osteoporosis     Pneumonia          SURGICALHISTORY       Past Surgical History:   Procedure Laterality Date    BACK SURGERY      BREAST SURGERY      CARPAL TUNNEL RELEASE      FRACTURE SURGERY Left 12/20/2018    ORIF wrist    HYSTERECTOMY      JOINT REPLACEMENT      right knee    JOINT REPLACEMENT      WRIST FRACTURE SURGERY Left 12/20/2018    WRIST OPEN REDUCTION INTERNAL FIXATION-DISTAL RADIUS performed by Ira Sherman MD at [place] [address]       Previous Medications    ALBUTEROL SULFATE  (90 BASE) MCG/ACT INHALER    Inhale 2 puffs into the lungs 4 times daily    ALENDRONATE (FOSAMAX) 70 MG TABLET    Take 70 mg by mouth every 7 days    ASCORBIC ACID (VITAMIN C) 1000 MG TABLET    Take 1 tablet by mouth 2 times daily for 7 days    ASPIRIN 81 MG TABLET    Take 81 mg by mouth daily    CALCIUM CITRATE-VITAMIN D (CITRICAL + D) 315-250 MG-UNIT TABS PER TABLET    Take 1 tablet by mouth 2 times daily (with meals)    DULOXETINE (CYMBALTA) 60 MG EXTENDED RELEASE CAPSULE    Take 60 mg by mouth daily    FUROSEMIDE (LASIX) 20 MG TABLET    Take 1 tablet by mouth daily    HYDROCODONE-ACETAMINOPHEN (NORCO)  MG PER TABLET    Take 1 tablet by mouth every 6 hours as needed for Pain.     HYDROXYCHLOROQUINE SULFATE (PLAQUENIL PO)    Take by mouth daily     LEVOTHYROXINE (SYNTHROID) 150 MCG TABLET    Take 150 mcg by mouth Daily    METOPROLOL TARTRATE (LOPRESSOR) 25 MG TABLET    Take 1 tablet by mouth 2 times daily    PANTOPRAZOLE SODIUM (PROTONIX) 40 MG PACK PACKET    Take 40 mg by mouth 2 times daily (before meals)    POTASSIUM CHLORIDE (KLOR-CON) 20 MEQ PACKET    Take 20 mEq by mouth 2 times daily    PREGABALIN (LYRICA) 300 MG CAPSULE    Take 1 capsule by mouth 2 times daily for 30 days. RIVAROXABAN (XARELTO) 20 MG TABS TABLET    Take 1 tablet by mouth daily (with breakfast)    TRAZODONE (DESYREL) 100 MG TABLET    Take 200 mg by mouth nightly     VITAMIN D (CHOLECALCIFEROL) 50 MCG ( UT) TABS TABLET    Take 1 tablet by mouth daily for 7 days         ALLERGIES   Patient has no known allergies. FAMILY HISTORY       Family History   Problem Relation Age of Onset    Heart Attack Father 61    Heart Attack Sister     Heart Disease Brother           SOCIAL HISTORY       Social History     Socioeconomic History    Marital status:      Spouse name: Not on file    Number of children: Not on file    Years of education: Not on file    Highest education level: Not on file   Occupational History    Not on file   Tobacco Use    Smoking status: Former Smoker     Packs/day: 1.00     Years: 10.00     Pack years: 10.00     Quit date: 1976     Years since quittin.9    Smokeless tobacco: Never Used   Vaping Use    Vaping Use: Never used   Substance and Sexual Activity    Alcohol use: No    Drug use: No    Sexual activity: Not on file   Other Topics Concern    Not on file   Social History Narrative    Not on file     Social Determinants of Health     Financial Resource Strain:     Difficulty of Paying Living Expenses:    Food Insecurity:     Worried About Running Out of Food in the Last Year:     [address] in the Last Year:    Transportation Needs:     Lack of Transportation (Medical):      Lack of Transportation (Non-Medical):    Physical Activity:     Days of Exercise per Week:     Minutes of Exercise per Session:    Stress:     Feeling of Stress :    Social Connections:     Frequency of Communication with Friends and Family:     Frequency of Social Gatherings with Friends and Family:     Attends [employer] Services:     Active Member of Clubs or Organizations:     Attends Club or Organization Meetings:     Marital interpreted by the emergency physician with the below findings:      Interpretationper the Radiologist below, if available at the time of this note:     [facility]   Final Result   1. No active GI bleed seen. 2. Suspected infectious/inflammatory airways process in the right lower lobe. 3. Large volume stool throughout the colon. 4. Cholelithiasis. ED BEDSIDE ULTRASOUND:   Performed by ED Physician - none    LABS:  Labs Reviewed   CBC WITH AUTO DIFFERENTIAL - Abnormal; Notable for the following components:       Result Value    WBC 12.3 (*)     RBC 3.66 (*)     Hemoglobin 7.9 (*)     Hematocrit 27.2 (*)     MCV 74.3 (*)     MCH 21.6 (*)     RDW 18.9 (*)     Seg Neutrophils 86 (*)     Lymphocytes 8 (*)     Eosinophils % 0 (*)     Segs Absolute 10.51 (*)     Absolute Lymph # 1.01 (*)     All other components within normal limits   COMPREHENSIVE METABOLIC PANEL - Abnormal; Notable for the following components:    Glucose 113 (*)     Bun/Cre Ratio 23 (*)     Total Bilirubin <0.10 (*)     All other components within normal limits   PROTIME-INR - Abnormal; Notable for the following components:    Protime 15.6 (*)     All other components within normal limits   APTT - Abnormal; Notable for the following components:    PTT 33.9 (*)     All other components within normal limits   URINE RT REFLEX TO CULTURE   MICROSCOPIC URINALYSIS   TYPE AND SCREEN   TYPE AND SCREEN   PREPARE RBC (CROSSMATCH)       All other labs were within normal range or not returned as of this dictation. EMERGENCY DEPARTMENT COURSE and DIFFERENTIAL DIAGNOSIS/MDM:   Vitals:    Vitals:    10/14/21 1759 10/14/21 1800   BP: (!) 171/81 (!) 149/87   Pulse: 93    Resp: 18    Temp: 97.7 °F (36.5 °C)    SpO2: 95%    Weight: 147 lb (66.7 kg)          MDM  Number of Diagnoses or Management Options  Diagnosis management comments: Patient was sent here for evaluation for increased weakness and dizziness at home.   Primary care physician saw her earlier today and had blood work performed showed she had a low hemoglobin hematocrit. I did repeat blood work here today shows her hemoglobin is 7.9 and hematocrit is 27.2. Patient denies any bloody diarrhea or stools. CT scan was performed here today as well she had some GI discomfort abdominal pain on examination. GI CT scan shows no acute GI bleeding and stools  , Constipation. Patient is otherwise stable at this time. She is not tachycardic or ectopic neck. She is resting comfortably. She states she wants something to eat. Procedures    FINAL IMPRESSION      1. Anemia, unspecified type        DISPOSITION/PLAN   DISPOSITION        PATIENT REFERRED TO:  No follow-up provider specified. DISCHARGE MEDICATIONS:  New Prescriptions    No medications on file              Summation      Patient Course:      ED Medications administered this visit:    Medications   0.9 % sodium chloride infusion (has no administration in time range)   iopamidol (ISOVUE-370) 76 % injection 75 mL (75 mLs IntraVENous Given 10/14/21 1945)       New Prescriptions from this visit:    New Prescriptions    No medications on file       Follow-up:  No follow-up provider specified. Final Impression:   1.  Anemia, unspecified type               (Please note that portions of this note were completed with a voice recognition program.  Efforts were made to edit the dictations but occasionally words are mis-transcribed.)           Haley Campbell PA-C  10/14/21 2110

## 2021-10-15 NOTE — PROGRESS NOTES
Afternoon assessment completed at this time. See flow sheet for details. Pt sitting up in chair,  at bedside. BP elevated, pt had received BP medications at 1216, will recheck pressure in approximately 1 hour. Pt also anxious about going home. Asking about going home as physician had mentioned that she would be discharged. Writer will contact Dr Mely Dodd per pt request. Pt denies other needs at this time. Call light in reach. Will continue to monitor.

## 2021-10-15 NOTE — PROGRESS NOTES
Pt arrived from ED to [facility] via cart. Pt was able to ambulate from cart to bed. VS are WNL. Pt alert and oriented x4. Admission navigator and assessment completed as charted. Will call Dr. Mely Dodd for orders.

## 2021-10-15 NOTE — PROGRESS NOTES
Comprehensive Nutrition Assessment    Type and Reason for Visit:  Initial (missing malnutrition screen. Observation )    Nutrition Recommendations/Plan: continue current diet, encourage high iron foods complimented with vitamin C foods to enhance absorption. Nutrition Assessment:  Increased nutrient needs r/t altered GI aeb Hgb 7.9. Pt received 2 U PRBC's, Hgb increased to 9.2. Pt asleep at attempted visit. f/u with iron diet education needs due to anemia. Malnutrition Assessment:  Malnutrition Status:  Insufficient data    Context:  Acute Illness     Findings of the 6 clinical characteristics of malnutrition:  Energy Intake:  Unable to assess  Weight Loss:  No significant weight loss     Body Fat Loss:  No significant body fat loss     Muscle Mass Loss:  No significant muscle mass loss    Fluid Accumulation:  No significant fluid accumulation     Strength:  Not Performed      Nutrition Related Findings:  appears well nourished       Wounds:  None       Current Nutrition Therapies:    ADULT DIET; Regular; GI Dent (GERD/Peptic Ulcer)    Anthropometric Measures:  · Height: 5' 4\" (162.6 cm)  · Current Body Weight: 151 lb 12.8 oz (68.9 kg)   · Admission Body Weight: 151 lb 12.8 oz (68.9 kg)    · Usual Body Weight: 149 lb 3.2 oz (67.7 kg) (4/22/21)     · Ideal Body Weight: 120 lbs; % Ideal Body Weight 126.5 %   · BMI: 26  · BMI Categories: Overweight (BMI 25.0-29. 9)       Nutrition Diagnosis:   · Increased nutrient needs related to altered GI function as evidenced by lab values      Nutrition Interventions:   Food and/or Nutrient Delivery:  Continue Current Diet  Nutrition Education/Counseling:  No recommendation at this time   Coordination of Nutrition Care:  Continue to monitor while inpatient    Goals:  PO > 75% of meals with high iron foods        Recent Labs     10/14/21  1810 10/15/21  0714    142   K 3.7 3.5*    104   CO2 26 28   BUN 14 11   CREATININE 0.62 0.55   GLUCOSE 113* 94   ALT 13  --    ALKPHOS 56  --    GFR                            Lab Results   Component Value Date    LABALBU 4.2 10/14/2021      Hemoglobin/Hematocrit:    Lab Results   Component Value Date    HGB 9.2 10/15/2021    HCT 31.0 10/15/2021   Nutrition Monitoring and Evaluation:   Behavioral-Environmental Outcomes:  None Identified   Food/Nutrient Intake Outcomes:  Food and Nutrient Intake  Physical Signs/Symptoms Outcomes:  Biochemical Data, Weight     Discharge Planning:    Continue current diet     Electronically signed by Marco Antonio Berkowitz RD, LD on 10/15/21 at 11:03 AM EDT    Contact: [phone]

## 2021-10-15 NOTE — H&P
History and Physical    Patient:  Laurita Schilling  MRN: [id number]    Chief Complaint: Fatigue    History Obtained From:  patient, electronic medical record    PCP: Jayson Webb MD    History of Present Illness: The patient is a 77 y.o. female who presented to the emergency room due to fatigue. Patient stated she was sent by her primary care physician. She stated that she had an H&H drawn and it was low. The emergency room physician spoke with her PCP who was not really familiar with her as her normal doctor was out of town. Patient does have a history of anemia with blood transfusions in the past per family history. Patient complained of weakness that was increasing over several days including dizziness. Patient does have history of COPD, depression and GERD. Patient denied fever chills. She denied melena or hematochezia. She denied any dark stools or obvious blood. Denied hematuria. Denied epistaxis. During her evaluation patient's hemoglobin was 7.9. Past Medical History:        Diagnosis Date    COPD (chronic obstructive pulmonary disease) (HCC)     Depression     GERD (gastroesophageal reflux disease)     Osteoporosis     Pneumonia        Past Surgical History:        Procedure Laterality Date    BACK SURGERY      BREAST SURGERY      CARPAL TUNNEL RELEASE      FRACTURE SURGERY Left 12/20/2018    ORIF wrist    HYSTERECTOMY      JOINT REPLACEMENT      right knee    JOINT REPLACEMENT      WRIST FRACTURE SURGERY Left 12/20/2018    WRIST OPEN REDUCTION INTERNAL FIXATION-DISTAL RADIUS performed by Ira Sherman MD at [address]       Medications Prior to Admission:    Prior to Admission medications    Medication Sig Start Date End Date Taking?  Authorizing Provider   rivaroxaban (XARELTO) 20 MG TABS tablet Take 1 tablet by mouth daily (with breakfast) 4/22/21  Yes Severiano Stable, MD   HYDROcodone-acetaminophen [facility])  MG per tablet Take 1 tablet by mouth every 6 hours as needed for Pain.   Yes Historical Provider, MD   Hydroxychloroquine Sulfate (PLAQUENIL PO) Take by mouth daily    Yes Historical Provider, MD   levothyroxine (SYNTHROID) 150 MCG tablet Take 150 mcg by mouth Daily   Yes Historical Provider, MD   DULoxetine (CYMBALTA) 60 MG extended release capsule Take 60 mg by mouth daily   Yes Historical Provider, MD   traZODone (DESYREL) 100 MG tablet Take 200 mg by mouth nightly    Yes Historical Provider, MD   pantoprazole sodium (PROTONIX) 40 MG PACK packet Take 40 mg by mouth 2 times daily (before meals)   Yes Historical Provider, MD   potassium chloride (KLOR-CON) 20 MEQ packet Take 20 mEq by mouth 2 times daily   Yes Historical Provider, MD   calcium citrate-vitamin D (CITRICAL + D) 315-250 MG-UNIT TABS per tablet Take 1 tablet by mouth 2 times daily (with meals)   Yes Historical Provider, MD   alendronate (FOSAMAX) 70 MG tablet Take 70 mg by mouth every 7 days   Yes Historical Provider, MD   furosemide (LASIX) 20 MG tablet Take 1 tablet by mouth daily 5/14/21   Kailyn Kumar MD   ascorbic acid (VITAMIN C) 1000 MG tablet Take 1 tablet by mouth 2 times daily for 7 days 12/29/20 5/20/21  RYAN Gaytan CNP   Vitamin D (CHOLECALCIFEROL) 50 MCG (2000 UT) TABS tablet Take 1 tablet by mouth daily for 7 days 12/30/20 5/20/21  RYAN Gaytan CNP   pregabalin (LYRICA) 300 MG capsule Take 1 capsule by mouth 2 times daily for 30 days. 11/6/20 5/20/21  Moshe Ross MD   metoprolol tartrate (LOPRESSOR) 25 MG tablet Take 1 tablet by mouth 2 times daily 11/5/20   Moshe Ross MD   albuterol sulfate  (90 Base) MCG/ACT inhaler Inhale 2 puffs into the lungs 4 times daily 10/8/18   Lb Santos PA-C   aspirin 81 MG tablet Take 81 mg by mouth daily    Historical Provider, MD       Allergies:  Patient has no known allergies. Social History:   TOBACCO:   reports that she quit smoking about 44 years ago. She has a 10.00 pack-year smoking history.  She has never used smokeless tobacco.  ETOH:   reports no history of alcohol use. Family History:       Problem Relation Age of Onset    Heart Attack Father 61    Heart Attack Sister     Heart Disease Brother        Allergies:  Patient has no known allergies. Medications Prior to Admission:    Prior to Admission medications    Medication Sig Start Date End Date Taking? Authorizing Provider   rivaroxaban (XARELTO) 20 MG TABS tablet Take 1 tablet by mouth daily (with breakfast) 4/22/21  Yes Karissa Johns MD   HYDROcodone-acetaminophen [facility])  MG per tablet Take 1 tablet by mouth every 6 hours as needed for Pain.    Yes Historical Provider, MD   Hydroxychloroquine Sulfate (PLAQUENIL PO) Take by mouth daily    Yes Historical Provider, MD   levothyroxine (SYNTHROID) 150 MCG tablet Take 150 mcg by mouth Daily   Yes Historical Provider, MD   DULoxetine (CYMBALTA) 60 MG extended release capsule Take 60 mg by mouth daily   Yes Historical Provider, MD   traZODone (DESYREL) 100 MG tablet Take 200 mg by mouth nightly    Yes Historical Provider, MD   pantoprazole sodium (PROTONIX) 40 MG PACK packet Take 40 mg by mouth 2 times daily (before meals)   Yes Historical Provider, MD   potassium chloride (KLOR-CON) 20 MEQ packet Take 20 mEq by mouth 2 times daily   Yes Historical Provider, MD   calcium citrate-vitamin D (CITRICAL + D) 315-250 MG-UNIT TABS per tablet Take 1 tablet by mouth 2 times daily (with meals)   Yes Historical Provider, MD   alendronate (FOSAMAX) 70 MG tablet Take 70 mg by mouth every 7 days   Yes Historical Provider, MD   furosemide (LASIX) 20 MG tablet Take 1 tablet by mouth daily 5/14/21   Karissa Johns MD   ascorbic acid (VITAMIN C) 1000 MG tablet Take 1 tablet by mouth 2 times daily for 7 days 12/29/20 5/20/21  RYAN Lebron CNP   Vitamin D (CHOLECALCIFEROL) 50 MCG (2000 UT) TABS tablet Take 1 tablet by mouth daily for 7 days 12/30/20 5/20/21  RYAN Lebron CNP   pregabalin (LYRICA) 300 MG capsule Take 1 capsule by mouth 2 times daily for 30 days. 11/6/20 5/20/21  Harry Weir MD   metoprolol tartrate (LOPRESSOR) 25 MG tablet Take 1 tablet by mouth 2 times daily 11/5/20   Harry Weir MD   albuterol sulfate  (90 Base) MCG/ACT inhaler Inhale 2 puffs into the lungs 4 times daily 10/8/18   Edwardo Santos PA-C   aspirin 81 MG tablet Take 81 mg by mouth daily    Historical Provider, MD       Review of Systems:  Constitutional:negative  for fevers, and negative for chills. Positive fatigue  Eyes: negative for visual disturbance   ENT: negative for sore throat, negative nasal congestion, and negative for earache  Respiratory: negative for shortness of breath, negative for cough, and negative for wheezing  Cardiovascular: negative for chest pain, negative for palpitations, and negative for syncope  Gastrointestinal: negative for abdominal pain, negative for nausea,negative for vomiting, negative for diarrhea, negative for constipation, and negative for hematochezia or melena  Genitourinary: negative for dysuria, negative for urinary urgency, negative for urinary frequency, and negative for hematuria  Skin: negative for skin rash, and negative for skin lesions  Neurological: negative for unilateral weakness, numbness or tingling. Physical Exam:    Vitals:   Temp: 97.3 °F (36.3 °C)  BP: (!) 169/87  Resp: 18  Pulse: 66  SpO2: 96 %  24HR INTAKE/OUTPUT:      Intake/Output Summary (Last 24 hours) at 10/15/2021 1142  Last data filed at 10/15/2021 0849  Gross per 24 hour   Intake 1050 ml   Output --   Net 1050 ml       Weight    Body mass index is 26.06 kg/m². Exam:  GEN:    Awake, alert and oriented x3. EYES:  EOMI, pupils equal   NECK: Supple. No lymphadenopathy. No carotid bruit  CVS:    regular rate and rhythm, no audible murmur  PULM:  CTA, no wheezes, rales or rhonchi, no acute respiratory distress  ABD:    Bowels sounds normal.  Abdomen is soft. No distention.   no tenderness to palpation. EXT:   no edema bilaterally . No calf tenderness. NEURO: Moves all extremities. Motor and sensory are grossly intact  SKIN:  No rashes. No skin lesions.    -----------------------------------------------------------------  Diagnostic Data:     DATA:    CBC:   Lab Results   Component Value Date    WBC 7.6 10/15/2021    RBC 4.06 10/15/2021    HGB 9.2 (L) 10/15/2021    HCT 31.0 (L) 10/15/2021    MCV 76.4 (L) 10/15/2021     10/15/2021        CMP:   Lab Results   Component Value Date    GLUCOSE 94 10/15/2021    BUN 11 10/15/2021    CREATININE 0.55 10/15/2021     10/15/2021    K 3.5 (L) 10/15/2021    CALCIUM 8.5 (L) 10/15/2021     10/15/2021    CO2 28 10/15/2021    PROT 7.0 10/14/2021    LABALBU 4.2 10/14/2021    BILITOT <0.10 (L) 10/14/2021    ALKPHOS 56 10/14/2021    ALT 13 10/14/2021    AST 14 10/14/2021       UA:   Lab Results   Component Value Date    COLORU Yellow 10/14/2021    SPECGRAV 1.020 10/14/2021    WBCUA None 10/14/2021    RBCUA None 10/14/2021    EPITHUA 0 TO 2 10/14/2021    LEUKOCYTESUR NEGATIVE 10/14/2021    GLUCOSEU NEGATIVE 10/14/2021    KETUA NEGATIVE 10/14/2021    PROTEINU NEGATIVE 10/14/2021    HGBUR NEGATIVE 10/14/2021    CASTUA NOT REPORTED 10/14/2021    CRYSTUA NOT REPORTED 10/14/2021    BACTERIA NOT REPORTED 10/14/2021    YEAST NOT REPORTED 10/14/2021       Lactic Acid:   Lab Results   Component Value Date    LACTA 1.1 05/20/2021       D-Dimer:  Lab Results   Component Value Date    DDIMER 0.81 (H) 12/29/2020       PT/INR:  Lab Results   Component Value Date    PROTIME 15.6 10/14/2021    INR 1.3 10/14/2021       High Sensitivity Troponin:  No results for input(s): TROPHS in the last 72 hours.     ABGs:   Lab Results   Component Value Date    PHART 7.296 10/22/2020    ZRS6AFG 52.8 10/22/2020    PO2ART 85.0 10/22/2020    ZNR8TMY 25.2 10/22/2020    FQO2BMQ 35 11/02/2020    B2EPOWND 95.3 10/22/2020    FIO2 NOT REPORTED 05/20/2021           CTA ABDOMEN PELVIS W WO active bleed  Decubitus ulcer present on admission: No  CODE STATUS: FULL CODE  Nutrition Status: good   Physical therapy: No   Old Charts reviewed: Yes  EKG Reviewed:  Yes  Advance Directive Addressed: Yes    RYAN Schaffer CNP, RYAN, NP-C  10/15/2021, 11:42 AM

## 2021-10-15 NOTE — PROGRESS NOTES
Comprehensive Nutrition Assessment    Type and Reason for Visit:  Patient Education    Nutrition Assessment:  Pt states of good PO and stable weight before admission. Pt provided iron content of food list and PUD nutrition therapy. Pt encouraged to compliment iron with vitamin C and to avoid spicy foods.      Electronically signed by Jairo Hummel RD, ANDREW on 10/15/21 at 12:57 PM EDT    Contact: [phone]

## 2021-10-15 NOTE — PROGRESS NOTES
Writer to bedside to complete morning assessment. Upon entry to room, pt sitting up in bed awake, respirations even and unlabored while on room air. Vitals obtained by nursing student and reviewed by Kevyn Lamb. Assessment completed, see flow sheet for details. Pt c/o headache, declines Tylenol at this time. Pt assisted to bathroom as standby assist, instructed to pull cord when done so staff can assist back to bed. Pt denies further needs from writer at this time. Call light in reach. Will continue to monitor.

## 2021-10-16 VITALS
SYSTOLIC BLOOD PRESSURE: 160 MMHG | RESPIRATION RATE: 16 BRPM | TEMPERATURE: 97.6 F | BODY MASS INDEX: 25.83 KG/M2 | DIASTOLIC BLOOD PRESSURE: 79 MMHG | HEART RATE: 60 BPM | HEIGHT: 64 IN | OXYGEN SATURATION: 95 % | WEIGHT: 151.3 LBS

## 2021-10-16 LAB
HCT VFR BLD CALC: 36.6 % (ref 36.3–47.1)
HEMOGLOBIN: 10.7 G/DL (ref 11.9–15.1)

## 2021-10-16 PROCEDURE — 85018 HEMOGLOBIN: CPT

## 2021-10-16 PROCEDURE — 6370000000 HC RX 637 (ALT 250 FOR IP): Performed by: NURSE PRACTITIONER

## 2021-10-16 PROCEDURE — 85014 HEMATOCRIT: CPT

## 2021-10-16 PROCEDURE — G0378 HOSPITAL OBSERVATION PER HR: HCPCS

## 2021-10-16 PROCEDURE — 2580000003 HC RX 258: Performed by: INTERNAL MEDICINE

## 2021-10-16 PROCEDURE — 36415 COLL VENOUS BLD VENIPUNCTURE: CPT

## 2021-10-16 RX ADMIN — DULOXETINE HYDROCHLORIDE 60 MG: 60 CAPSULE, DELAYED RELEASE ORAL at 09:36

## 2021-10-16 RX ADMIN — LEVOTHYROXINE SODIUM 150 MCG: 150 TABLET ORAL at 09:36

## 2021-10-16 RX ADMIN — SODIUM CHLORIDE: 9 INJECTION, SOLUTION INTRAVENOUS at 04:21

## 2021-10-16 RX ADMIN — METOPROLOL TARTRATE 25 MG: 25 TABLET, FILM COATED ORAL at 09:36

## 2021-10-16 RX ADMIN — PANTOPRAZOLE SODIUM 40 MG: 40 TABLET, DELAYED RELEASE ORAL at 09:36

## 2021-10-16 ASSESSMENT — PAIN SCALES - GENERAL: PAINLEVEL_OUTOF10: 0

## 2021-10-16 NOTE — PROGRESS NOTES
CO2 28 10/15/2021       PROBLEM LIST:  Principal Problem:    Anemia  Resolved Problems:    * No resolved hospital problems. *      ASSESSMENT / PLAN:  Anemia  · improved  · D/c home  · Nutrition status:  Well developed, well nourished with no malnutrition  · DVT prophylaxis: Lovenox   · High risk medications: none   · Disposition:  Discharge plan is home    Aleksandra Sultana MD , MFRANKLIN.  10/16/2021  11:13 AM

## 2021-10-16 NOTE — PROGRESS NOTES
Discharge instructions given to pt and . No questions, pt verbalized understanding all instructions. Pt aware of follow up appointment as listed on AVS. Pt d/c'd off unit at this time, via wheelchair, with spouse, to home. Belongings in hand. No issues noted.

## 2021-10-16 NOTE — DISCHARGE SUMMARY
Physician Discharge Summary  Jaclyn Krause MD       Patient ID:  Ale Ortiz  [id number]  1945    Admission date: 10/14/2021    Discharge date: 10/16/2021     Admitting Physician: Josesito Becerra MD     Primary Care Physician: Suhas Mcdonnell MD     Primary Discharge Diagnoses:   Patient Active Problem List    Diagnosis Date Noted    Anemia 10/14/2021    Biventricular congestive heart failure ([facility]     Hypothyroidism 10/05/2018    Major depressive disorder 10/05/2018    Chronic midline low back pain without sciatica 10/05/2018    Iron deficiency anemia 10/05/2018       Additional Diagnoses:       Diagnosis Date    COPD (chronic obstructive pulmonary disease) ([facility]     Depression     GERD (gastroesophageal reflux disease)     Osteoporosis     Pneumonia          Review of Systems:  Constitutional: negative for fevers or chills  Eyes: negative for visual disturbance   ENT: negative for sore throat or nasal congestion  Respiratory: negative for shortness of breath or cough  Cardiovascular: negative for chest pain ,palpitations,pnd,syncope  Gastrointestinal: negative for abd pain, nausea, vomiting, diarrhea , constipation,hemetemesis,kylee,blood in stool  Genitourinary: negative for dysuria, urgency ,frequency,hematuria  Integument/breast: negative for skin rash or lesions  Neurological: negative for unilateral weakness, numbness or tingling. Skeletal Muscular: no joint pain,jont swelling,back pain              Physical exam:      -----------------------------------------------------------------  Exam:  GEN:   A & O x3, no apparent distress  EYES: No gross abnormalities.   NECK: normal, supple, no lymphadenopathy,  no carotid bruits  PULM: clear to auscultation bilaterally- no wheezes, rales or rhonchi, normal air movement, no respiratory distress  COR: regular rate & rhythm and no gallops  ABD:  soft, non-tender, non-distended, normal bowel sounds, no masses or organomegaly  EXT:   no cyanosis, clubbing or edema present    NEURO: negative  SKIN:  no rashes or significant lesions  -----------------------------------------------------------------          Hospital Course: The patient was admitted for the above. She was treated with one uint of prbc  and improved over the course of her hospitalization. Her iron studies,b12 and folate were normal. Her hb improved to 10.7. she is dischared in stable condition. She is to follow with her PCP DR Reji Garzon in 1 wk    Consultants:    · none    Procedures:    · none    Complications:   · none    Significant Diagnostic Studies:   CTA ABDOMEN PELVIS W WO CONTRAST    Result Date: 10/14/2021  EXAMINATION: CTA OF THE ABDOMEN AND PELVIS WITH AND WITHOUT CONTRAST 10/14/2021 7:44 pm TECHNIQUE: CTA of the abdomen and pelvis was performed without and with the administration of intravenous contrast. Multiplanar reformatted images are provided for review. MIP images are provided for review. Dose modulation, iterative reconstruction, and/or weight based adjustment of the mA/kV was utilized to reduce the radiation dose to as low as reasonably achievable. COMPARISON: 08/28/2019 HISTORY: ORDERING SYSTEM PROVIDED HISTORY: gi bleeding, constipation TECHNOLOGIST PROVIDED HISTORY: gi bleeding, constipation FINDINGS: Lower Chest: Bronchial wall thickening and nodular airspace opacities in the right lower lobe. Organs: Liver is normal in contour and enhancement. Cholelithiasis. No biliary ductal diltation. Pancreas is unremarkable. Adrenals are unremarkable. Mild splenomegaly. No renal calculi or hydronephrosis. Mild calcific atherosclerosis. GI/Bowel: Moderate hiatal hernia. Large volume stool throughout the colon. Bowel is non-dilated without wall thickening. Appendix is normal. Pelvis: Unremarkable. Peritoneum/Retroperitoneum:No free fluid, free air, organized fluid collection or lymphadenopathy. Bones: Diffuse osteopenia with multilevel degenerative disc disease.      1. No mL/min    GFR Comment          GFR Staging         TYPE AND SCREEN    Collection Time: 10/14/21  6:10 PM   Result Value Ref Range    Expiration Date 10/17/2021,2359     Arm Band Number [id number]     ABO/Rh O POSITIVE     Antibody Screen NEGATIVE     Unit Number [id number]     Product Code Leukocyte Reduced Red Cell     Unit Divison 00     Dispense Status ISSUED     Transfusion Status OK TO TRANSFUSE     Crossmatch Result COMPATIBLE     Unit Number [id number]     Product Code Leukocyte Reduced Red Cell     Unit Divison 00     Dispense Status ISSUED     Transfusion Status OK TO TRANSFUSE     Crossmatch Result COMPATIBLE    Protime-INR    Collection Time: 10/14/21  6:10 PM   Result Value Ref Range    Protime 15.6 (H) 11.5 - 14.2 sec    INR 1.3    APTT    Collection Time: 10/14/21  6:10 PM   Result Value Ref Range    PTT 33.9 (H) 23.9 - 33.8 sec   Urinalysis Reflex to Culture    Collection Time: 10/14/21  8:07 PM    Specimen: Urine, clean catch   Result Value Ref Range    Color, UA Yellow Yellow    Turbidity UA Clear Clear    Glucose, Ur NEGATIVE NEGATIVE    Bilirubin Urine NEGATIVE NEGATIVE    Ketones, Urine NEGATIVE NEGATIVE    Specific Gravity, UA 1.020 1.010 - 1.020    Urine Hgb NEGATIVE NEGATIVE    pH, UA 6.0 5.0 - 9.0    Protein, UA NEGATIVE NEGATIVE    Urobilinogen, Urine Normal Normal    Nitrite, Urine NEGATIVE NEGATIVE    Leukocyte Esterase, Urine NEGATIVE NEGATIVE    Urinalysis Comments NOT REPORTED    Microscopic Urinalysis    Collection Time: 10/14/21  8:07 PM   Result Value Ref Range    -          WBC, UA None 0 - 5 /HPF    RBC, UA None 0 - 2 /HPF    Casts UA NOT REPORTED /LPF    Crystals, UA NOT REPORTED None /HPF    Epithelial Cells UA 0 TO 2 0 - 25 /HPF    Renal Epithelial, UA NOT REPORTED 0 /HPF    Bacteria, UA NOT REPORTED None    Mucus, UA NOT REPORTED None    Trichomonas, UA NOT REPORTED None    Amorphous, UA NOT REPORTED None    Other Observations UA NOT REPORTED NOT REQ.     Yeast, UA NOT REPORTED None   EKG 12 lead    Collection Time: 10/15/21  4:24 AM   Result Value Ref Range    Ventricular Rate 67 BPM    Atrial Rate 67 BPM    P-R Interval 184 ms    QRS Duration 88 ms    Q-T Interval 400 ms    QTc Calculation (Bazett) 422 ms    P Axis 55 degrees    R Axis -8 degrees    T Axis 28 degrees   CBC auto differential    Collection Time: 10/15/21  7:14 AM   Result Value Ref Range    WBC 7.6 3.5 - 11.3 k/uL    RBC 4.06 3.95 - 5.11 m/uL    Hemoglobin 9.2 (L) 11.9 - 15.1 g/dL    Hematocrit 31.0 (L) 36.3 - 47.1 %    MCV 76.4 (L) 82.6 - 102.9 fL    MCH 22.7 (L) 25.2 - 33.5 pg    MCHC 29.7 28.4 - 34.8 g/dL    RDW 20.3 (H) 11.8 - 14.4 %    Platelets 621 814 - 071 k/uL    MPV 10.3 8.1 - 13.5 fL    NRBC Automated 0.0 0.0 per 100 WBC    Differential Type NOT REPORTED     WBC Morphology NOT REPORTED     RBC Morphology NOT REPORTED     Platelet Estimate NOT REPORTED     Seg Neutrophils 60 36 - 65 %    Lymphocytes 27 24 - 43 %    Monocytes 10 3 - 12 %    Eosinophils % 2 1 - 4 %    Immature Granulocytes 0 0 %    Basophils 1 0 - 2 %    Segs Absolute 4.56 1.50 - 8.10 k/uL    Absolute Lymph # 2.05 1.10 - 3.70 k/uL    Absolute Mono # 0.76 0.10 - 1.20 k/uL    Absolute Eos # 0.15 0.00 - 0.44 k/uL    Absolute Immature Granulocyte 0.00 0.00 - 0.30 k/uL    Basophils Absolute 0.08 0.0 - 0.2 k/uL    Morphology HYPOCHROMIA PRESENT     Morphology ANISOCYTOSIS PRESENT    Basic Metabolic Panel w/ Reflex to MG    Collection Time: 10/15/21  7:14 AM   Result Value Ref Range    Glucose 94 70 - 99 mg/dL    BUN 11 8 - 23 mg/dL    CREATININE 0.55 0.50 - 0.90 mg/dL    Bun/Cre Ratio 20 9 - 20    Calcium 8.5 (L) 8.6 - 10.4 mg/dL    Sodium 142 135 - 144 mmol/L    Potassium 3.5 (L) 3.7 - 5.3 mmol/L    Chloride 104 98 - 107 mmol/L    CO2 28 20 - 31 mmol/L    Anion Gap 10 9 - 17 mmol/L    GFR Non-African American >60 >60 mL/min    GFR African American >60 >60 mL/min    GFR Comment          GFR Staging         Haptoglobin    Collection Time: 10/15/21  7:14 AM   Result Value Ref Range    Haptoglobin 109 30 - 200 mg/dL   Iron and TIBC    Collection Time: 10/15/21  7:14 AM   Result Value Ref Range    Iron 98 37 - 145 ug/dL    TIBC 351 250 - 450 ug/dL    Iron Saturation 28 20 - 55 %    UIBC 253 112 - 347 ug/dL   Ferritin    Collection Time: 10/15/21  7:14 AM   Result Value Ref Range    Ferritin 18 13 - 150 ug/L   Vitamin B12 & folate    Collection Time: 10/15/21  7:14 AM   Result Value Ref Range    Vitamin B-12 258 232 - 1245 pg/mL    Folate >20.0 >4.8 ng/mL   Magnesium    Collection Time: 10/15/21  7:14 AM   Result Value Ref Range    Magnesium 2.2 1.6 - 2.6 mg/dL   Hemoglobin and Hematocrit, Blood    Collection Time: 10/15/21 11:40 AM   Result Value Ref Range    Hemoglobin 9.9 (L) 11.9 - 15.1 g/dL    Hematocrit 33.8 (L) 36.3 - 47.1 %   Hemoglobin and Hematocrit, Blood    Collection Time: 10/16/21  6:56 AM   Result Value Ref Range    Hemoglobin 10.7 (L) 11.9 - 15.1 g/dL    Hematocrit 36.6 36.3 - 47.1 %     ·     Discharge Condition:   · stable    Disposition:   · home    Discharge Medications:      Nikolas Dee   Home Medication Instructions SRR:[id number]    Printed on:10/16/21 1119   Medication Information                      albuterol sulfate  (90 Base) MCG/ACT inhaler  Inhale 2 puffs into the lungs 4 times daily             alendronate (FOSAMAX) 70 MG tablet  Take 70 mg by mouth every 7 days             ascorbic acid (VITAMIN C) 1000 MG tablet  Take 1 tablet by mouth 2 times daily for 7 days             aspirin 81 MG tablet  Take 81 mg by mouth daily             calcium citrate-vitamin D (CITRICAL + D) 315-250 MG-UNIT TABS per tablet  Take 1 tablet by mouth 2 times daily (with meals)             DULoxetine (CYMBALTA) 60 MG extended release capsule  Take 60 mg by mouth daily             furosemide (LASIX) 20 MG tablet  Take 1 tablet by mouth daily             HYDROcodone-acetaminophen ([facility])  MG per tablet  Take 1 tablet by mouth every 6 hours as needed for Pain. Hydroxychloroquine Sulfate (PLAQUENIL PO)  Take by mouth daily              levothyroxine (SYNTHROID) 150 MCG tablet  Take 150 mcg by mouth Daily             metoprolol tartrate (LOPRESSOR) 25 MG tablet  Take 1 tablet by mouth 2 times daily             pantoprazole sodium (PROTONIX) 40 MG PACK packet  Take 40 mg by mouth 2 times daily (before meals)             potassium chloride (KLOR-CON) 20 MEQ packet  Take 20 mEq by mouth 2 times daily             pregabalin (LYRICA) 300 MG capsule  Take 1 capsule by mouth 2 times daily for 30 days. rivaroxaban (XARELTO) 20 MG TABS tablet  Take 1 tablet by mouth daily (with breakfast)             traZODone (DESYREL) 100 MG tablet  Take 200 mg by mouth nightly              Vitamin D (CHOLECALCIFEROL) 50 MCG (2000 UT) TABS tablet  Take 1 tablet by mouth daily for 7 days                 · Resume all home medications unless otherwise directed  · Add -none  · Stop taking     Patient Instructions:   · Activity: activity as tolerated  · Diet: regular diet  · Wound Care: none needed  · Other:   · Follow up with Dr Huang Redd in 1 wk as directed      Time Spent on discharge services is 25 minutes in the examination, evaluation, counseling and review of medications and discharge plan.     Signed:  Leonela Kenny MD, M.D.  10/16/2021  11:19 AM

## 2021-10-16 NOTE — DISCHARGE INSTR - DIET

## 2021-10-17 LAB
ABO/RH: NORMAL
ANTIBODY SCREEN: NEGATIVE
ARM BAND NUMBER: NORMAL
BLD PROD TYP BPU: NORMAL
BLD PROD TYP BPU: NORMAL
CROSSMATCH RESULT: NORMAL
CROSSMATCH RESULT: NORMAL
DISPENSE STATUS BLOOD BANK: NORMAL
DISPENSE STATUS BLOOD BANK: NORMAL
EXPIRATION DATE: NORMAL
TRANSFUSION STATUS: NORMAL
TRANSFUSION STATUS: NORMAL
UNIT DIVISION: 0
UNIT DIVISION: 0
UNIT NUMBER: NORMAL
UNIT NUMBER: NORMAL

## 2021-10-19 ENCOUNTER — HOSPITAL ENCOUNTER (OUTPATIENT)
Age: 76
Discharge: HOME | End: 2021-10-19
Payer: MEDICARE

## 2021-10-19 LAB
ABSOLUTE EOS #: 0.09 K/UL (ref 0–0.44)
ABSOLUTE IMMATURE GRANULOCYTE: 0 K/UL (ref 0–0.3)
ABSOLUTE LYMPH #: 2.48 K/UL (ref 1.1–3.7)
ABSOLUTE MONO #: 0.83 K/UL (ref 0.1–1.2)
BASOPHILS # BLD: 0 % (ref 0–2)
BASOPHILS ABSOLUTE: 0 K/UL (ref 0–0.2)
DIFFERENTIAL TYPE: ABNORMAL
EOSINOPHILS RELATIVE PERCENT: 1 % (ref 1–4)
HCT VFR BLD CALC: 32.9 % (ref 36.3–47.1)
HEMOGLOBIN: 9.5 G/DL (ref 11.9–15.1)
IMMATURE GRANULOCYTES: 0 %
LYMPHOCYTES # BLD: 27 % (ref 24–43)
MCH RBC QN AUTO: 22.8 PG (ref 25.2–33.5)
MCHC RBC AUTO-ENTMCNC: 28.9 G/DL (ref 28.4–34.8)
MCV RBC AUTO: 78.9 FL (ref 82.6–102.9)
MONOCYTES # BLD: 9 % (ref 3–12)
MORPHOLOGY: ABNORMAL
NRBC AUTOMATED: 0 PER 100 WBC
PDW BLD-RTO: 21.3 % (ref 11.8–14.4)
PLATELET # BLD: 252 K/UL (ref 138–453)
PLATELET ESTIMATE: ABNORMAL
PMV BLD AUTO: 10 FL (ref 8.1–13.5)
RBC # BLD: 4.17 M/UL (ref 3.95–5.11)
RBC # BLD: ABNORMAL 10*6/UL
SEG NEUTROPHILS: 63 % (ref 36–65)
SEGMENTED NEUTROPHILS ABSOLUTE COUNT: 5.8 K/UL (ref 1.5–8.1)
WBC # BLD: 9.2 K/UL (ref 3.5–11.3)
WBC # BLD: ABNORMAL 10*3/UL

## 2021-10-19 PROCEDURE — 36415 COLL VENOUS BLD VENIPUNCTURE: CPT

## 2021-10-19 PROCEDURE — 85025 COMPLETE CBC W/AUTO DIFF WBC: CPT

## 2021-12-03 ENCOUNTER — TELEPHONE (OUTPATIENT)
Dept: CARDIOLOGY | Age: 76
End: 2021-12-03

## 2021-12-03 NOTE — TELEPHONE ENCOUNTER
Pt is having an EGD and [address] [facility] would like to know how long she needs to hold her Xarelto for?

## 2021-12-06 NOTE — TELEPHONE ENCOUNTER
Do you want to see her before colonoscopy? Can she be scheduled with Tanisha? Or do you want her squeezed in somewhere?

## 2022-01-15 ENCOUNTER — HOSPITAL ENCOUNTER (INPATIENT)
Age: 77
LOS: 2 days | Discharge: HOME | DRG: 194 | End: 2022-01-18
Attending: EMERGENCY MEDICINE | Admitting: INTERNAL MEDICINE
Payer: MEDICARE

## 2022-01-15 ENCOUNTER — APPOINTMENT (OUTPATIENT)
Dept: CT IMAGING | Age: 77
DRG: 194 | End: 2022-01-15
Payer: MEDICARE

## 2022-01-15 ENCOUNTER — APPOINTMENT (OUTPATIENT)
Dept: GENERAL RADIOLOGY | Age: 77
DRG: 194 | End: 2022-01-15
Payer: MEDICARE

## 2022-01-15 DIAGNOSIS — J44.1 COPD EXACERBATION (HCC): Primary | ICD-10-CM

## 2022-01-15 LAB
ABSOLUTE EOS #: <0.03 K/UL (ref 0–0.44)
ABSOLUTE IMMATURE GRANULOCYTE: <0.03 K/UL (ref 0–0.3)
ABSOLUTE LYMPH #: 0.69 K/UL (ref 1.1–3.7)
ABSOLUTE MONO #: 1.02 K/UL (ref 0.1–1.2)
ALBUMIN SERPL-MCNC: 4.1 G/DL (ref 3.5–5.2)
ALBUMIN/GLOBULIN RATIO: 1.5 (ref 1–2.5)
ALLEN TEST: ABNORMAL
ALP BLD-CCNC: 66 U/L (ref 35–104)
ALT SERPL-CCNC: 13 U/L (ref 5–33)
ANION GAP SERPL CALCULATED.3IONS-SCNC: 11 MMOL/L (ref 9–17)
AST SERPL-CCNC: 18 U/L
BASOPHILS # BLD: 0 % (ref 0–2)
BASOPHILS ABSOLUTE: 0.03 K/UL (ref 0–0.2)
BILIRUB SERPL-MCNC: 0.38 MG/DL (ref 0.3–1.2)
BUN BLDV-MCNC: 15 MG/DL (ref 8–23)
BUN/CREAT BLD: 24 (ref 9–20)
CALCIUM SERPL-MCNC: 9.8 MG/DL (ref 8.6–10.4)
CARBOXYHEMOGLOBIN: ABNORMAL % (ref 0–5)
CHLORIDE BLD-SCNC: 100 MMOL/L (ref 98–107)
CO2: 28 MMOL/L (ref 20–31)
CREAT SERPL-MCNC: 0.63 MG/DL (ref 0.5–0.9)
DIFFERENTIAL TYPE: ABNORMAL
DIRECT EXAM: NORMAL
EKG ATRIAL RATE: 130 BPM
EKG P AXIS: 67 DEGREES
EKG P-R INTERVAL: 166 MS
EKG Q-T INTERVAL: 286 MS
EKG QRS DURATION: 82 MS
EKG QTC CALCULATION (BAZETT): 420 MS
EKG R AXIS: -42 DEGREES
EKG T AXIS: 78 DEGREES
EKG VENTRICULAR RATE: 130 BPM
EOSINOPHILS RELATIVE PERCENT: 0 % (ref 1–4)
FIO2: ABNORMAL
GFR AFRICAN AMERICAN: >60 ML/MIN
GFR NON-AFRICAN AMERICAN: >60 ML/MIN
GFR SERPL CREATININE-BSD FRML MDRD: ABNORMAL ML/MIN/{1.73_M2}
GFR SERPL CREATININE-BSD FRML MDRD: ABNORMAL ML/MIN/{1.73_M2}
GLUCOSE BLD-MCNC: 99 MG/DL (ref 70–99)
HCO3 ARTERIAL: 26.3 MMOL/L (ref 22–26)
HCT VFR BLD CALC: 43 % (ref 36.3–47.1)
HEMOGLOBIN: 13.7 G/DL (ref 11.9–15.1)
IMMATURE GRANULOCYTES: 0 %
LYMPHOCYTES # BLD: 7 % (ref 24–43)
Lab: NORMAL
MCH RBC QN AUTO: 28.8 PG (ref 25.2–33.5)
MCHC RBC AUTO-ENTMCNC: 31.9 G/DL (ref 28.4–34.8)
MCV RBC AUTO: 90.5 FL (ref 82.6–102.9)
METHEMOGLOBIN: ABNORMAL % (ref 0–1.9)
MODE: ABNORMAL
MONOCYTES # BLD: 11 % (ref 3–12)
NEGATIVE BASE EXCESS, ART: ABNORMAL MMOL/L (ref 0–2)
NOTIFICATION TIME: ABNORMAL
NOTIFICATION: ABNORMAL
NRBC AUTOMATED: 0 PER 100 WBC
O2 DEVICE/FLOW/%: ABNORMAL
O2 SAT, ARTERIAL: 92.8 % (ref 95–98)
OXYHEMOGLOBIN: ABNORMAL % (ref 95–98)
PATIENT TEMP: ABNORMAL
PCO2 ARTERIAL: 45.2 MMHG (ref 35–45)
PCO2, ART, TEMP ADJ: ABNORMAL
PDW BLD-RTO: 17.4 % (ref 11.8–14.4)
PEEP/CPAP: ABNORMAL
PH ARTERIAL: 7.38 (ref 7.35–7.45)
PH, ART, TEMP ADJ: ABNORMAL (ref 7.35–7.45)
PLATELET # BLD: 218 K/UL (ref 138–453)
PLATELET ESTIMATE: ABNORMAL
PMV BLD AUTO: 9.8 FL (ref 8.1–13.5)
PO2 ARTERIAL: 66.5 MMHG (ref 80–100)
PO2, ART, TEMP ADJ: ABNORMAL MMHG (ref 80–100)
POSITIVE BASE EXCESS, ART: 0.8 MMOL/L (ref 0–2)
POTASSIUM SERPL-SCNC: 3.8 MMOL/L (ref 3.7–5.3)
PSV: ABNORMAL
PT. POSITION: ABNORMAL
RBC # BLD: 4.75 M/UL (ref 3.95–5.11)
RBC # BLD: ABNORMAL 10*6/UL
RESPIRATORY RATE: 16
SAMPLE SITE: ABNORMAL
SARS-COV-2, RAPID: NOT DETECTED
SEG NEUTROPHILS: 82 % (ref 36–65)
SEGMENTED NEUTROPHILS ABSOLUTE COUNT: 7.76 K/UL (ref 1.5–8.1)
SET RATE: ABNORMAL
SODIUM BLD-SCNC: 139 MMOL/L (ref 135–144)
SPECIMEN DESCRIPTION: NORMAL
SPECIMEN DESCRIPTION: NORMAL
TEXT FOR RESPIRATORY: ABNORMAL
TOTAL HB: ABNORMAL G/DL (ref 12–16)
TOTAL PROTEIN: 6.9 G/DL (ref 6.4–8.3)
TOTAL RATE: ABNORMAL
TROPONIN INTERP: ABNORMAL
TROPONIN T: ABNORMAL NG/ML
TROPONIN, HIGH SENSITIVITY: 28 NG/L (ref 0–14)
VT: ABNORMAL
WBC # BLD: 9.5 K/UL (ref 3.5–11.3)
WBC # BLD: ABNORMAL 10*3/UL

## 2022-01-15 PROCEDURE — 87040 BLOOD CULTURE FOR BACTERIA: CPT

## 2022-01-15 PROCEDURE — U0005 INFEC AGEN DETEC AMPLI PROBE: HCPCS

## 2022-01-15 PROCEDURE — 87804 INFLUENZA ASSAY W/OPTIC: CPT

## 2022-01-15 PROCEDURE — 87635 SARS-COV-2 COVID-19 AMP PRB: CPT

## 2022-01-15 PROCEDURE — 94640 AIRWAY INHALATION TREATMENT: CPT

## 2022-01-15 PROCEDURE — 82805 BLOOD GASES W/O2 SATURATION: CPT

## 2022-01-15 PROCEDURE — 96361 HYDRATE IV INFUSION ADD-ON: CPT

## 2022-01-15 PROCEDURE — 71260 CT THORAX DX C+: CPT

## 2022-01-15 PROCEDURE — 36415 COLL VENOUS BLD VENIPUNCTURE: CPT

## 2022-01-15 PROCEDURE — 6370000000 HC RX 637 (ALT 250 FOR IP): Performed by: EMERGENCY MEDICINE

## 2022-01-15 PROCEDURE — 96365 THER/PROPH/DIAG IV INF INIT: CPT

## 2022-01-15 PROCEDURE — 85025 COMPLETE CBC W/AUTO DIFF WBC: CPT

## 2022-01-15 PROCEDURE — 36600 WITHDRAWAL OF ARTERIAL BLOOD: CPT

## 2022-01-15 PROCEDURE — 93010 ELECTROCARDIOGRAM REPORT: CPT | Performed by: FAMILY MEDICINE

## 2022-01-15 PROCEDURE — 80053 COMPREHEN METABOLIC PANEL: CPT

## 2022-01-15 PROCEDURE — 2580000003 HC RX 258: Performed by: EMERGENCY MEDICINE

## 2022-01-15 PROCEDURE — U0003 INFECTIOUS AGENT DETECTION BY NUCLEIC ACID (DNA OR RNA); SEVERE ACUTE RESPIRATORY SYNDROME CORONAVIRUS 2 (SARS-COV-2) (CORONAVIRUS DISEASE [COVID-19]), AMPLIFIED PROBE TECHNIQUE, MAKING USE OF HIGH THROUGHPUT TECHNOLOGIES AS DESCRIBED BY CMS-2020-01-R: HCPCS

## 2022-01-15 PROCEDURE — 6360000004 HC RX CONTRAST MEDICATION: Performed by: EMERGENCY MEDICINE

## 2022-01-15 PROCEDURE — 93005 ELECTROCARDIOGRAM TRACING: CPT | Performed by: EMERGENCY MEDICINE

## 2022-01-15 PROCEDURE — 71045 X-RAY EXAM CHEST 1 VIEW: CPT

## 2022-01-15 PROCEDURE — 6360000002 HC RX W HCPCS: Performed by: EMERGENCY MEDICINE

## 2022-01-15 PROCEDURE — 84484 ASSAY OF TROPONIN QUANT: CPT

## 2022-01-15 PROCEDURE — 96375 TX/PRO/DX INJ NEW DRUG ADDON: CPT

## 2022-01-15 RX ORDER — SODIUM CHLORIDE 9 MG/ML
1000 INJECTION, SOLUTION INTRAVENOUS CONTINUOUS
Status: DISCONTINUED | OUTPATIENT
Start: 2022-01-15 | End: 2022-01-18 | Stop reason: HOSPADM

## 2022-01-15 RX ORDER — DEXAMETHASONE SODIUM PHOSPHATE 10 MG/ML
6 INJECTION INTRAMUSCULAR; INTRAVENOUS ONCE
Status: COMPLETED | OUTPATIENT
Start: 2022-01-15 | End: 2022-01-15

## 2022-01-15 RX ORDER — 0.9 % SODIUM CHLORIDE 0.9 %
500 INTRAVENOUS SOLUTION INTRAVENOUS ONCE
Status: DISCONTINUED | OUTPATIENT
Start: 2022-01-15 | End: 2022-01-15

## 2022-01-15 RX ORDER — IPRATROPIUM BROMIDE AND ALBUTEROL SULFATE 2.5; .5 MG/3ML; MG/3ML
1 SOLUTION RESPIRATORY (INHALATION) ONCE
Status: COMPLETED | OUTPATIENT
Start: 2022-01-15 | End: 2022-01-15

## 2022-01-15 RX ORDER — DEXAMETHASONE SODIUM PHOSPHATE 10 MG/ML
6 INJECTION INTRAMUSCULAR; INTRAVENOUS EVERY 12 HOURS
Status: DISCONTINUED | OUTPATIENT
Start: 2022-01-15 | End: 2022-01-16 | Stop reason: SDUPTHER

## 2022-01-15 RX ORDER — ALBUTEROL SULFATE 2.5 MG/3ML
2.5 SOLUTION RESPIRATORY (INHALATION) EVERY 6 HOURS PRN
Status: DISCONTINUED | OUTPATIENT
Start: 2022-01-15 | End: 2022-01-17

## 2022-01-15 RX ORDER — METHYLPREDNISOLONE SODIUM SUCCINATE 125 MG/2ML
125 INJECTION, POWDER, LYOPHILIZED, FOR SOLUTION INTRAMUSCULAR; INTRAVENOUS ONCE
Status: COMPLETED | OUTPATIENT
Start: 2022-01-15 | End: 2022-01-15

## 2022-01-15 RX ADMIN — DEXAMETHASONE SODIUM PHOSPHATE 6 MG: 10 INJECTION INTRAMUSCULAR; INTRAVENOUS at 14:30

## 2022-01-15 RX ADMIN — METHYLPREDNISOLONE SODIUM SUCCINATE 125 MG: 125 INJECTION, POWDER, FOR SOLUTION INTRAMUSCULAR; INTRAVENOUS at 12:57

## 2022-01-15 RX ADMIN — AZITHROMYCIN MONOHYDRATE 500 MG: 500 INJECTION, POWDER, LYOPHILIZED, FOR SOLUTION INTRAVENOUS at 17:12

## 2022-01-15 RX ADMIN — ALBUTEROL SULFATE 2.5 MG: 2.5 SOLUTION RESPIRATORY (INHALATION) at 20:38

## 2022-01-15 RX ADMIN — IOPAMIDOL 75 ML: 755 INJECTION, SOLUTION INTRAVENOUS at 14:10

## 2022-01-15 RX ADMIN — SODIUM CHLORIDE 1000 ML: 9 INJECTION, SOLUTION INTRAVENOUS at 19:23

## 2022-01-15 RX ADMIN — IPRATROPIUM BROMIDE AND ALBUTEROL SULFATE 1 AMPULE: 2.5; .5 SOLUTION RESPIRATORY (INHALATION) at 13:04

## 2022-01-15 ASSESSMENT — PAIN DESCRIPTION - LOCATION: LOCATION: BACK

## 2022-01-15 ASSESSMENT — PAIN DESCRIPTION - DIRECTION: RADIATING_TOWARDS: BILATERAL LEGS

## 2022-01-15 ASSESSMENT — PAIN SCALES - GENERAL: PAINLEVEL_OUTOF10: 8

## 2022-01-15 ASSESSMENT — PAIN DESCRIPTION - PAIN TYPE: TYPE: CHRONIC PAIN

## 2022-01-15 NOTE — ED PROVIDER NOTES
[facility] Name: Martha Liriano  MRN: [id number]  [place] 1945  Date of evaluation: 1/15/2022  Provider: Hans Awan MD    CHIEF COMPLAINT       Chief Complaint   Patient presents with    Shortness of Breath     Onset 2 days ago, worse today.  Cough     Onset today, productive         HISTORY OF PRESENT ILLNESS   (Location/Symptom, Timing/Onset, Context/Setting, Quality, Duration, Modifying Factors, Severity)  Note limiting factors. Martha Liriano is a 77 y.o. female who presents to the emergency department        67-year-old female presented emergency department for evaluation of shortness of breath. Symptoms began about 2 days ago. She does have a known history of COPD and has had multiple emergency department visits in the past.  She has been vaccinated against COVID-19. She has no known sick contacts. She did use an albuterol inhaler shortly prior to arrival with no significant improvement in her shortness of breath. No nausea or vomiting. Patient denies any other acute concerns. She does not use home oxygen. She was 86% on room air on arrival.          Nursing Notes were reviewed. REVIEW OF SYSTEMS    (2-9 systems for level 4, 10 or more for level 5)     Review of Systems   All other systems reviewed and are negative. Except as noted above the remainder of the review of systems was reviewed and negative.        PAST MEDICAL HISTORY     Past Medical History:   Diagnosis Date    Chronic pain syndrome     dilaudid infusion pump    COPD (chronic obstructive pulmonary disease) (HCC)     Depression     GERD (gastroesophageal reflux disease)     Hypothyroidism     Osteoporosis          SURGICAL HISTORY       Past Surgical History:   Procedure Laterality Date    BACK SURGERY      BREAST SURGERY      CARPAL TUNNEL RELEASE      FRACTURE SURGERY Left 12/20/2018    ORIF wrist    HYSTERECTOMY      JOINT REPLACEMENT      right knee  JOINT REPLACEMENT      WRIST FRACTURE SURGERY Left 12/20/2018    WRIST OPEN REDUCTION INTERNAL FIXATION-DISTAL RADIUS performed by Rogelio Palacios MD at [address] [place] [address]       Current Discharge Medication List      CONTINUE these medications which have NOT CHANGED    Details   dextrose 5 % SOLN with HYDROmorphone HCl PF 10 MG/ML SOLN 1 mg/mL Infuse intravenously continuous Pt has continuous dilaudid infusion pump implanted, but is unsure of dosage. furosemide (LASIX) 20 MG tablet Take 1 tablet by mouth daily  Qty: 90 tablet, Refills: 3      rivaroxaban (XARELTO) 20 MG TABS tablet Take 1 tablet by mouth daily (with breakfast)  Qty: 90 tablet, Refills: 3      ascorbic acid (VITAMIN C) 1000 MG tablet Take 1 tablet by mouth 2 times daily for 7 days  Qty: 14 tablet, Refills: 0      Vitamin D (CHOLECALCIFEROL) 50 MCG (2000 UT) TABS tablet Take 1 tablet by mouth daily for 7 days  Qty: 7 tablet, Refills: 0    Comments: Labeling may look different. 25 mcg=1000 Units. Please double check dosages.       HYDROcodone-acetaminophen (NORCO)  MG per tablet Take 1 tablet by mouth every 6 hours as needed for Pain.      metoprolol tartrate (LOPRESSOR) 25 MG tablet Take 1 tablet by mouth 2 times daily  Qty: 60 tablet, Refills: 3      Hydroxychloroquine Sulfate (PLAQUENIL PO) Take 1 tablet by mouth daily       albuterol sulfate  (90 Base) MCG/ACT inhaler Inhale 2 puffs into the lungs 4 times daily  Qty: 1 Inhaler, Refills: 0      levothyroxine (SYNTHROID) 150 MCG tablet Take 150 mcg by mouth Daily      aspirin 81 MG tablet Take 81 mg by mouth daily      DULoxetine (CYMBALTA) 60 MG extended release capsule Take 60 mg by mouth daily      pantoprazole sodium (PROTONIX) 40 MG PACK packet Take 40 mg by mouth 2 times daily (before meals)      potassium chloride (KLOR-CON) 20 MEQ packet Take 20 mEq by mouth 2 times daily      calcium citrate-vitamin D (CITRICAL + D) 315-250 MG-UNIT TABS per tablet Take 1 tablet by mouth 2 times daily (with meals)      alendronate (FOSAMAX) 70 MG tablet Take 70 mg by mouth every 7 days      pregabalin (LYRICA) 300 MG capsule Take 1 capsule by mouth 2 times daily for 30 days. Qty: 60 capsule, Refills: 0    Associated Diagnoses: Chronic arthritis      traZODone (DESYREL) 100 MG tablet Take 200 mg by mouth nightly              ALLERGIES     Patient has no known allergies. FAMILY HISTORY       Family History   Problem Relation Age of Onset    Heart Attack Father 61    Heart Attack Sister     Heart Disease Brother           SOCIAL HISTORY       Social History     Socioeconomic History    Marital status:      Spouse name: None    Number of children: None    Years of education: None    Highest education level: None   Occupational History    None   Tobacco Use    Smoking status: Former Smoker     Packs/day: 1.00     Years: 10.00     Pack years: 10.00     Quit date: 1976     Years since quittin.2    Smokeless tobacco: Never Used   Vaping Use    Vaping Use: Never used   Substance and Sexual Activity    Alcohol use: No    Drug use: No    Sexual activity: None   Other Topics Concern    None   Social History Narrative    None     Social Determinants of Health     Financial Resource Strain:     Difficulty of Paying Living Expenses: Not on file   Food Insecurity:     Worried About Running Out of Food in the Last Year: Not on file    Concepcion of Food in the Last Year: Not on file   Transportation Needs:     Lack of Transportation (Medical): Not on file    Lack of Transportation (Non-Medical):  Not on file   Physical Activity:     Days of Exercise per Week: Not on file    Minutes of Exercise per Session: Not on file   Stress:     Feeling of Stress : Not on file   Social Connections:     Frequency of Communication with Friends and Family: Not on file    Frequency of Social Gatherings with Friends and Family: Not on file    Attends Hindu Services: Not on file    Active Member of Clubs or Organizations: Not on file    Attends Club or Organization Meetings: Not on file    Marital Status: Not on file   Intimate Partner Violence:     Fear of Current or Ex-Partner: Not on file    Emotionally Abused: Not on file    Physically Abused: Not on file    Sexually Abused: Not on file   Housing Stability:     Unable to Pay for Housing in the Last Year: Not on file    Number of [place] in the Last Year: Not on file    Unstable Housing in the Last Year: Not on file       SCREENINGS        [place] Coma Scale  Eye Opening: Spontaneous  Best Verbal Response: Oriented  Best Motor Response: Obeys commands  Kristy Coma Scale Score: 15               PHYSICAL EXAM    (up to 7 for level 4, 8 or more for level 5)     ED Triage Vitals [01/15/22 1218]   BP Temp Temp src Pulse Resp SpO2 Height Weight   131/65 100.5 °F (38.1 °C) -- 133 26 (!) 86 % 5' 4\" (1.626 m) 140 lb (63.5 kg)       Physical Exam  Vitals and nursing note reviewed. Constitutional:       Comments: Elderly female who is awake alert temperature of 38.1. She is tachycardic. Oxygen saturation was 86% on room air. 96% on 2 L via nasal cannula. Cardiovascular:      Comments: Tachycardia heart rate 130 bpm.  Regular rhythm. Pulmonary:      Comments: Diminished breath sounds and diffuse wheezing bilaterally. Skin:     General: Skin is warm and dry. Findings: No rash. Neurological:      Mental Status: She is alert and oriented to person, place, and time. DIAGNOSTIC RESULTS     EKG: All EKG's are interpreted by the Emergency Department Physician who either signs or Co-signs this chart in the absence of a cardiologist.    Sinus tachycardia heart rate 130 bpm.  Poor baseline. Nonspecific intraventricular conduction delay. No acute ST segment or T wave findings.   Nonspecific with no acute findings of infarction    RADIOLOGY:   Non-plain film images such as CT, Ultrasound and MRI are read by the radiologist. Cliff Alicia radiographic images are visualized and preliminarily interpreted by the emergency physician with the below findings:          Interpretation per the Radiologist below, if available at the time of this note:    CT CHEST PULMONARY EMBOLISM W CONTRAST   Final Result   Patchy bilateral interstitial, airspace and ground-glass infiltrates most   notably in the lung bases compatible with bilateral pneumonia      Coronary artery/atherosclerotic disease      No obvious evidence of pulmonary embolism, however the segmental and   subsegmental branches are not well opacified. Multiple nonspecific lymph nodes seen in the middle mediastinum, subcarinal   region and guillermo, possibly reactive and relatively stable from prior exam         XR CHEST PORTABLE   Final Result   Bilateral interstitial groundglass opacities likely represent viral   pneumonia, COVID-19 is not excluded.                ED BEDSIDE ULTRASOUND:   Performed by ED Physician - none    LABS:  Labs Reviewed   CBC WITH AUTO DIFFERENTIAL - Abnormal; Notable for the following components:       Result Value    RDW 17.4 (*)     Seg Neutrophils 82 (*)     Lymphocytes 7 (*)     Eosinophils % 0 (*)     Absolute Lymph # 0.69 (*)     All other components within normal limits   COMPREHENSIVE METABOLIC PANEL W/ REFLEX TO MG FOR LOW K - Abnormal; Notable for the following components:    Bun/Cre Ratio 24 (*)     All other components within normal limits   TROPONIN - Abnormal; Notable for the following components:    Troponin, High Sensitivity 28 (*)     All other components within normal limits   BLOOD GAS, ARTERIAL - Abnormal; Notable for the following components:    pCO2, Arterial 45.2 (*)     pO2, Arterial 66.5 (*)     HCO3, Arterial 26.3 (*)     O2 Sat, Arterial 92.8 (*)     All other components within normal limits   C-REACTIVE PROTEIN - Abnormal; Notable for the following components:    .8 (*)     All other components within normal limits   D-DIMER, QUANTITATIVE - Abnormal; Notable for the following components:    D-Dimer, Quant 1.98 (*)     All other components within normal limits   CBC WITH AUTO DIFFERENTIAL - Abnormal; Notable for the following components:    RDW 17.3 (*)     Seg Neutrophils 80 (*)     Lymphocytes 13 (*)     Eosinophils % 0 (*)     All other components within normal limits   COMPREHENSIVE METABOLIC PANEL W/ REFLEX TO MG FOR LOW K - Abnormal; Notable for the following components:    Bun/Cre Ratio 22 (*)     Total Bilirubin 0.28 (*)     All other components within normal limits   D-DIMER, QUANTITATIVE - Abnormal; Notable for the following components:    D-Dimer, Quant 0.86 (*)     All other components within normal limits   RAPID INFLUENZA A/B ANTIGENS   CULTURE, BLOOD 1   CULTURE, BLOOD 2   COVID-19, RAPID   RESPIRATORY PANEL, MOLECULAR, WITH COVID-19   COVID-19   C-REACTIVE PROTEIN       All other labs were within normal range or not returned as of this dictation. EMERGENCY DEPARTMENT COURSE and DIFFERENTIAL DIAGNOSIS/MDM:   Vitals:    Vitals:    01/16/22 2340 01/17/22 0348 01/17/22 0602 01/17/22 0700   BP:    125/63   Pulse:    66   Resp:   16 16   Temp:    98.4 °F (36.9 °C)   TempSrc:    Temporal   SpO2: 95%  95% 92%   Weight:  149 lb 12.8 oz (67.9 kg)     Height:                 MDM  Number of Diagnoses or Management Options  COPD exacerbation (HCC)  Diagnosis management comments: Patient's oxygen saturation improved from 86 to percent to mid to high 90s on supplemental oxygen via nasal cannula. She did receive aerosol treatment as well as IV steroids. COVID-19 testing is negative and backup PCR testing was sent. Due to history of COPD as well as findings on CT scan of bilateral basilar infiltrates patient was started on azithromycin. Patient was discussed via telephone Dr. Abimael Ivan and when a bed is available she will be admitted to the floor.       [facility]         REASSESSMENT          CRITICAL CARE TIME   Total

## 2022-01-15 NOTE — ED NOTES
Bed: 12  Expected date:   Expected time:   Means of arrival:   Comments:  MARVIN Silver  01/15/22 3942

## 2022-01-16 PROBLEM — J18.9 COMMUNITY ACQUIRED PNEUMONIA: Status: ACTIVE | Noted: 2022-01-16

## 2022-01-16 PROBLEM — J16.0 COMMUNITY ACQUIRED PNEUMONIA DUE TO CHLAMYDIA SPECIES: Status: ACTIVE | Noted: 2022-01-16

## 2022-01-16 PROBLEM — G89.29 CHRONIC MIDLINE LOW BACK PAIN WITHOUT SCIATICA: Chronic | Status: ACTIVE | Noted: 2018-10-05

## 2022-01-16 PROBLEM — E03.9 HYPOTHYROIDISM: Chronic | Status: ACTIVE | Noted: 2018-10-05

## 2022-01-16 PROBLEM — M54.50 CHRONIC MIDLINE LOW BACK PAIN WITHOUT SCIATICA: Chronic | Status: ACTIVE | Noted: 2018-10-05

## 2022-01-16 LAB
C-REACTIVE PROTEIN: 182.8 MG/L (ref 0–5)
D-DIMER QUANTITATIVE: 1.98 MG/L FEU (ref 0–0.59)
SARS-COV-2: NORMAL
SARS-COV-2: NOT DETECTED
SOURCE: NORMAL

## 2022-01-16 PROCEDURE — 2580000003 HC RX 258: Performed by: INTERNAL MEDICINE

## 2022-01-16 PROCEDURE — 94761 N-INVAS EAR/PLS OXIMETRY MLT: CPT

## 2022-01-16 PROCEDURE — 2580000003 HC RX 258: Performed by: EMERGENCY MEDICINE

## 2022-01-16 PROCEDURE — 6370000000 HC RX 637 (ALT 250 FOR IP): Performed by: INTERNAL MEDICINE

## 2022-01-16 PROCEDURE — 36415 COLL VENOUS BLD VENIPUNCTURE: CPT

## 2022-01-16 PROCEDURE — 6360000002 HC RX W HCPCS: Performed by: EMERGENCY MEDICINE

## 2022-01-16 PROCEDURE — 1200000000 HC SEMI PRIVATE

## 2022-01-16 PROCEDURE — 96361 HYDRATE IV INFUSION ADD-ON: CPT

## 2022-01-16 PROCEDURE — 99285 EMERGENCY DEPT VISIT HI MDM: CPT

## 2022-01-16 PROCEDURE — 86140 C-REACTIVE PROTEIN: CPT

## 2022-01-16 PROCEDURE — 94640 AIRWAY INHALATION TREATMENT: CPT

## 2022-01-16 PROCEDURE — 85379 FIBRIN DEGRADATION QUANT: CPT

## 2022-01-16 PROCEDURE — 96376 TX/PRO/DX INJ SAME DRUG ADON: CPT

## 2022-01-16 PROCEDURE — 2700000000 HC OXYGEN THERAPY PER DAY

## 2022-01-16 PROCEDURE — 6360000002 HC RX W HCPCS: Performed by: INTERNAL MEDICINE

## 2022-01-16 PROCEDURE — 6370000000 HC RX 637 (ALT 250 FOR IP): Performed by: EMERGENCY MEDICINE

## 2022-01-16 RX ORDER — ONDANSETRON 2 MG/ML
4 INJECTION INTRAMUSCULAR; INTRAVENOUS EVERY 6 HOURS PRN
Status: DISCONTINUED | OUTPATIENT
Start: 2022-01-16 | End: 2022-01-18 | Stop reason: HOSPADM

## 2022-01-16 RX ORDER — ACETAMINOPHEN 325 MG/1
650 TABLET ORAL EVERY 6 HOURS PRN
Status: DISCONTINUED | OUTPATIENT
Start: 2022-01-16 | End: 2022-01-18 | Stop reason: HOSPADM

## 2022-01-16 RX ORDER — FUROSEMIDE 20 MG/1
20 TABLET ORAL DAILY
Status: DISCONTINUED | OUTPATIENT
Start: 2022-01-16 | End: 2022-01-18 | Stop reason: HOSPADM

## 2022-01-16 RX ORDER — VITAMIN B COMPLEX
2000 TABLET ORAL DAILY
Status: DISCONTINUED | OUTPATIENT
Start: 2022-01-16 | End: 2022-01-18 | Stop reason: HOSPADM

## 2022-01-16 RX ORDER — SODIUM CHLORIDE 9 MG/ML
25 INJECTION, SOLUTION INTRAVENOUS PRN
Status: DISCONTINUED | OUTPATIENT
Start: 2022-01-16 | End: 2022-01-18 | Stop reason: HOSPADM

## 2022-01-16 RX ORDER — HYDROCODONE BITARTRATE AND ACETAMINOPHEN 5; 325 MG/1; MG/1
1 TABLET ORAL ONCE
Status: COMPLETED | OUTPATIENT
Start: 2022-01-16 | End: 2022-01-16

## 2022-01-16 RX ORDER — POLYETHYLENE GLYCOL 3350 17 G/17G
17 POWDER, FOR SOLUTION ORAL DAILY PRN
Status: DISCONTINUED | OUTPATIENT
Start: 2022-01-16 | End: 2022-01-18 | Stop reason: HOSPADM

## 2022-01-16 RX ORDER — SODIUM CHLORIDE 0.9 % (FLUSH) 0.9 %
5-40 SYRINGE (ML) INJECTION EVERY 12 HOURS SCHEDULED
Status: DISCONTINUED | OUTPATIENT
Start: 2022-01-16 | End: 2022-01-18 | Stop reason: HOSPADM

## 2022-01-16 RX ORDER — POTASSIUM CHLORIDE 750 MG/1
20 TABLET, EXTENDED RELEASE ORAL 2 TIMES DAILY
Status: DISCONTINUED | OUTPATIENT
Start: 2022-01-16 | End: 2022-01-18 | Stop reason: HOSPADM

## 2022-01-16 RX ORDER — GUAIFENESIN/DEXTROMETHORPHAN 100-10MG/5
5 SYRUP ORAL EVERY 4 HOURS PRN
Status: DISCONTINUED | OUTPATIENT
Start: 2022-01-16 | End: 2022-01-18 | Stop reason: HOSPADM

## 2022-01-16 RX ORDER — ALBUTEROL SULFATE 90 UG/1
2 AEROSOL, METERED RESPIRATORY (INHALATION) 4 TIMES DAILY
Status: DISCONTINUED | OUTPATIENT
Start: 2022-01-16 | End: 2022-01-18

## 2022-01-16 RX ORDER — ACETAMINOPHEN 650 MG/1
650 SUPPOSITORY RECTAL EVERY 6 HOURS PRN
Status: DISCONTINUED | OUTPATIENT
Start: 2022-01-16 | End: 2022-01-18 | Stop reason: HOSPADM

## 2022-01-16 RX ORDER — HYDROXYCHLOROQUINE SULFATE 200 MG/1
200 TABLET, FILM COATED ORAL DAILY
Status: DISCONTINUED | OUTPATIENT
Start: 2022-01-16 | End: 2022-01-18 | Stop reason: HOSPADM

## 2022-01-16 RX ORDER — DEXAMETHASONE 4 MG/1
6 TABLET ORAL DAILY
Status: DISCONTINUED | OUTPATIENT
Start: 2022-01-17 | End: 2022-01-18 | Stop reason: HOSPADM

## 2022-01-16 RX ORDER — DULOXETIN HYDROCHLORIDE 60 MG/1
60 CAPSULE, DELAYED RELEASE ORAL DAILY
Status: DISCONTINUED | OUTPATIENT
Start: 2022-01-16 | End: 2022-01-18 | Stop reason: HOSPADM

## 2022-01-16 RX ORDER — HYDROCODONE BITARTRATE AND ACETAMINOPHEN 10; 325 MG/1; MG/1
1 TABLET ORAL EVERY 6 HOURS PRN
Status: DISCONTINUED | OUTPATIENT
Start: 2022-01-16 | End: 2022-01-18 | Stop reason: HOSPADM

## 2022-01-16 RX ORDER — FAMOTIDINE 20 MG/1
20 TABLET, FILM COATED ORAL 2 TIMES DAILY
Status: DISCONTINUED | OUTPATIENT
Start: 2022-01-16 | End: 2022-01-18 | Stop reason: HOSPADM

## 2022-01-16 RX ORDER — ASPIRIN 81 MG/1
81 TABLET ORAL DAILY
Status: DISCONTINUED | OUTPATIENT
Start: 2022-01-16 | End: 2022-01-18 | Stop reason: HOSPADM

## 2022-01-16 RX ORDER — LEVOTHYROXINE SODIUM 0.15 MG/1
150 TABLET ORAL DAILY
Status: DISCONTINUED | OUTPATIENT
Start: 2022-01-16 | End: 2022-01-18 | Stop reason: HOSPADM

## 2022-01-16 RX ORDER — ENALAPRILAT 2.5 MG/2ML
1.25 INJECTION INTRAVENOUS EVERY 6 HOURS PRN
Status: DISCONTINUED | OUTPATIENT
Start: 2022-01-16 | End: 2022-01-18 | Stop reason: HOSPADM

## 2022-01-16 RX ORDER — ONDANSETRON 4 MG/1
4 TABLET, ORALLY DISINTEGRATING ORAL EVERY 8 HOURS PRN
Status: DISCONTINUED | OUTPATIENT
Start: 2022-01-16 | End: 2022-01-18 | Stop reason: HOSPADM

## 2022-01-16 RX ORDER — TRAZODONE HYDROCHLORIDE 50 MG/1
200 TABLET ORAL NIGHTLY
Status: DISCONTINUED | OUTPATIENT
Start: 2022-01-16 | End: 2022-01-18 | Stop reason: HOSPADM

## 2022-01-16 RX ORDER — SODIUM CHLORIDE 0.9 % (FLUSH) 0.9 %
5-40 SYRINGE (ML) INJECTION PRN
Status: DISCONTINUED | OUTPATIENT
Start: 2022-01-16 | End: 2022-01-18 | Stop reason: HOSPADM

## 2022-01-16 RX ORDER — PANTOPRAZOLE SODIUM 40 MG/1
40 TABLET, DELAYED RELEASE ORAL
Status: DISCONTINUED | OUTPATIENT
Start: 2022-01-16 | End: 2022-01-18 | Stop reason: HOSPADM

## 2022-01-16 RX ADMIN — FUROSEMIDE 20 MG: 20 TABLET ORAL at 12:06

## 2022-01-16 RX ADMIN — SODIUM CHLORIDE 1000 ML: 9 INJECTION, SOLUTION INTRAVENOUS at 06:46

## 2022-01-16 RX ADMIN — ALBUTEROL SULFATE 2.5 MG: 2.5 SOLUTION RESPIRATORY (INHALATION) at 16:33

## 2022-01-16 RX ADMIN — METOPROLOL 25 MG: 25 TABLET ORAL at 12:07

## 2022-01-16 RX ADMIN — POTASSIUM CHLORIDE 20 MEQ: 750 TABLET, EXTENDED RELEASE ORAL at 20:07

## 2022-01-16 RX ADMIN — FAMOTIDINE 20 MG: 20 TABLET, FILM COATED ORAL at 20:08

## 2022-01-16 RX ADMIN — DULOXETINE 60 MG: 60 CAPSULE, DELAYED RELEASE ORAL at 12:06

## 2022-01-16 RX ADMIN — ASPIRIN 81 MG: 81 TABLET, COATED ORAL at 12:06

## 2022-01-16 RX ADMIN — METOPROLOL 25 MG: 25 TABLET ORAL at 20:07

## 2022-01-16 RX ADMIN — SODIUM CHLORIDE, PRESERVATIVE FREE 10 ML: 5 INJECTION INTRAVENOUS at 20:08

## 2022-01-16 RX ADMIN — LEVOTHYROXINE SODIUM 150 MCG: 150 TABLET ORAL at 12:07

## 2022-01-16 RX ADMIN — DEXAMETHASONE SODIUM PHOSPHATE 6 MG: 10 INJECTION INTRAMUSCULAR; INTRAVENOUS at 04:20

## 2022-01-16 RX ADMIN — ALBUTEROL SULFATE 2.5 MG: 2.5 SOLUTION RESPIRATORY (INHALATION) at 21:29

## 2022-01-16 RX ADMIN — HYDROCODONE BITARTRATE AND ACETAMINOPHEN 1 TABLET: 5; 325 TABLET ORAL at 09:05

## 2022-01-16 RX ADMIN — PANTOPRAZOLE SODIUM 40 MG: 40 TABLET, DELAYED RELEASE ORAL at 12:07

## 2022-01-16 RX ADMIN — Medication 2000 UNITS: at 12:06

## 2022-01-16 RX ADMIN — TRAZODONE HYDROCHLORIDE 200 MG: 50 TABLET ORAL at 20:07

## 2022-01-16 RX ADMIN — FAMOTIDINE 20 MG: 20 TABLET, FILM COATED ORAL at 12:06

## 2022-01-16 RX ADMIN — HYDROCODONE BITARTRATE AND ACETAMINOPHEN 1 TABLET: 10; 325 TABLET ORAL at 17:47

## 2022-01-16 RX ADMIN — AZITHROMYCIN MONOHYDRATE 500 MG: 500 INJECTION, POWDER, LYOPHILIZED, FOR SOLUTION INTRAVENOUS at 17:35

## 2022-01-16 RX ADMIN — HYDROXYCHLOROQUINE SULFATE 200 MG: 200 TABLET ORAL at 12:06

## 2022-01-16 RX ADMIN — CEFTRIAXONE 1000 MG: 1 INJECTION, POWDER, FOR SOLUTION INTRAMUSCULAR; INTRAVENOUS at 12:20

## 2022-01-16 RX ADMIN — POTASSIUM CHLORIDE 20 MEQ: 750 TABLET, EXTENDED RELEASE ORAL at 12:06

## 2022-01-16 RX ADMIN — HYDROCODONE BITARTRATE AND ACETAMINOPHEN 1 TABLET: 10; 325 TABLET ORAL at 12:24

## 2022-01-16 RX ADMIN — RIVAROXABAN 20 MG: 20 TABLET, FILM COATED ORAL at 12:07

## 2022-01-16 ASSESSMENT — PAIN DESCRIPTION - PAIN TYPE
TYPE: CHRONIC PAIN

## 2022-01-16 ASSESSMENT — PAIN DESCRIPTION - DESCRIPTORS: DESCRIPTORS: ACHING

## 2022-01-16 ASSESSMENT — PAIN SCALES - GENERAL
PAINLEVEL_OUTOF10: 7
PAINLEVEL_OUTOF10: 8
PAINLEVEL_OUTOF10: 0
PAINLEVEL_OUTOF10: 7
PAINLEVEL_OUTOF10: 8

## 2022-01-16 ASSESSMENT — PAIN DESCRIPTION - FREQUENCY: FREQUENCY: CONTINUOUS

## 2022-01-16 ASSESSMENT — PAIN DESCRIPTION - ONSET: ONSET: ON-GOING

## 2022-01-16 ASSESSMENT — PAIN DESCRIPTION - ORIENTATION: ORIENTATION: LOWER

## 2022-01-16 ASSESSMENT — PAIN DESCRIPTION - LOCATION
LOCATION: BACK

## 2022-01-16 NOTE — ED NOTES
Patient assisted to chair. Breakfast tray provided at this time.       Shagufta Peter RN  01/16/22 6946

## 2022-01-16 NOTE — ED NOTES
Report called to Giovani Chatman RN on [facility]. yRanne Barron RN to transport patient to floor. Per Ryanne Barron, Dr. Juice Godinez has been notified.       Kirill Velez RN  01/16/22 3744

## 2022-01-16 NOTE — PROGRESS NOTES
Patient brought up from ER to [facility] room 301. Patient alert and oriented x 4. Vital signs and head to toe assessment performed. Ms. John Schmidt is on 3 liters of supplemental oxygen with Spo2: 100%. Breathing is easy and unlabored at this time. Bed in lowest position with bed alarm on and bed wheels locked. Call light and patient belongings in reach. Instructed to use call light for assistance.

## 2022-01-16 NOTE — ED NOTES
RT called.  aware of pt cognitive changes and increase in respiratory workload.  Pt congested in chest     Isai Zamora RN  01/15/22 1935

## 2022-01-16 NOTE — PROGRESS NOTES
Patient has a implantable pain pump (Dilaudid) in right side of abdomen. Has been refilled by her pain clinic recently and is not due for a change for  2 months.  Dr Aniyah Núñez informed

## 2022-01-16 NOTE — H&P
[facility] M.D. Internal Medicine History and Physical     Patient: Nirmal Monique  Date of Admission: 1/15/2022 12:15 PM  Date of Evaluation: 1/16/2022      Subjective:      Chief Complaint:    Chief Complaint   Patient presents with    Shortness of Breath     Onset 2 days ago, worse today.  Cough     Onset today, productive       History Obtained From:  patient, electronic medical record  PCP: Nasir Araiza MD    History of Present Illness:   Nirmal Monique is a 77 y.o. female who presented to the ER yesterday complaining of shortenss of breath and cough, and was found to be hypoxic with SpO2 86% on room air upon arrival.  .   Symptoms began 2 days prior to ER presentation. Has known COPD with multiple ER visits in the past.  She used her  She is fully vaccinated against Covid-19. She was tachycardic upon arrival with temp 100.5. She states she has had pneumonia in the past and feels a lot like she did then. Review of Systems:  Constitutional:negative  for fevers, and negative for chills. Respiratory: positive for shortness of breath, positive for cough, and positive for wheezing  Cardiovascular: negative for chest pain, negative for palpitations, and negative for syncope  Gastrointestinal: negative for abdominal pain, negative for nausea,negative for vomiting, negative for diarrhea, negative for constipation, and negative for hematochezia or melena  Genitourinary: negative for dysuria, negative for urinary urgency, negative for urinary frequency, and negative for hematuria  Neurological: negative for unilateral weakness, numbness or tingling.     All other systems were reviewed with the patient and are negative except as stated above      History:      Past Medical History:        Diagnosis Date    Chronic pain syndrome     dilaudid infusion pump    COPD (chronic obstructive pulmonary disease) (HCC)     Depression     GERD (gastroesophageal reflux disease)     Hypothyroidism  Osteoporosis        Past Surgical History:        Procedure Laterality Date    BACK SURGERY      BREAST SURGERY      CARPAL TUNNEL RELEASE      FRACTURE SURGERY Left 12/20/2018    ORIF wrist    HYSTERECTOMY      JOINT REPLACEMENT      right knee    JOINT REPLACEMENT      WRIST FRACTURE SURGERY Left 12/20/2018    WRIST OPEN REDUCTION INTERNAL FIXATION-DISTAL RADIUS performed by Anna Bell MD at [address]       Medications Prior to Admission:    Prior to Admission medications    Medication Sig Start Date End Date Taking? Authorizing Provider   dextrose 5 % SOLN with HYDROmorphone HCl PF 10 MG/ML SOLN 1 mg/mL Infuse intravenously continuous Pt has continuous dilaudid infusion pump implanted, but is unsure of dosage. Yes Historical Provider, MD   furosemide (LASIX) 20 MG tablet Take 1 tablet by mouth daily 5/14/21  Yes Bettey Bloch, MD   rivaroxaban (XARELTO) 20 MG TABS tablet Take 1 tablet by mouth daily (with breakfast) 4/22/21  Yes Bettey Bloch, MD   ascorbic acid (VITAMIN C) 1000 MG tablet Take 1 tablet by mouth 2 times daily for 7 days 12/29/20 1/16/22 Yes RYAN Abreu CNP   Vitamin D (CHOLECALCIFEROL) 50 MCG (2000 UT) TABS tablet Take 1 tablet by mouth daily for 7 days 12/30/20 1/16/22 Yes RYAN Abreu CNP   HYDROcodone-acetaminophen (NORCO)  MG per tablet Take 1 tablet by mouth every 6 hours as needed for Pain.    Yes Historical Provider, MD   metoprolol tartrate (LOPRESSOR) 25 MG tablet Take 1 tablet by mouth 2 times daily 11/5/20  Yes Jodi Sneed MD   Hydroxychloroquine Sulfate (PLAQUENIL PO) Take 1 tablet by mouth daily    Yes Historical Provider, MD   albuterol sulfate  (90 Base) MCG/ACT inhaler Inhale 2 puffs into the lungs 4 times daily 10/8/18  Yes Ba Santos PA-C   levothyroxine (SYNTHROID) 150 MCG tablet Take 150 mcg by mouth Daily   Yes Historical Provider, MD   aspirin 81 MG tablet Take 81 mg by mouth daily   Yes Historical Provider, MD DULoxetine (CYMBALTA) 60 MG extended release capsule Take 60 mg by mouth daily   Yes Historical Provider, MD   pantoprazole sodium (PROTONIX) 40 MG PACK packet Take 40 mg by mouth 2 times daily (before meals)   Yes Historical Provider, MD   potassium chloride (KLOR-CON) 20 MEQ packet Take 20 mEq by mouth 2 times daily   Yes Historical Provider, MD   calcium citrate-vitamin D (CITRICAL + D) 315-250 MG-UNIT TABS per tablet Take 1 tablet by mouth 2 times daily (with meals)   Yes Historical Provider, MD   alendronate (FOSAMAX) 70 MG tablet Take 70 mg by mouth every 7 days   Yes Historical Provider, MD   pregabalin (LYRICA) 300 MG capsule Take 1 capsule by mouth 2 times daily for 30 days. 11/6/20 5/20/21  Hermila Almodovar MD   traZODone (DESYREL) 100 MG tablet Take 200 mg by mouth nightly     Historical Provider, MD       Allergies:  Patient has no known allergies. Social History:   TOBACCO:   reports that she quit smoking about 45 years ago. She has a 10.00 pack-year smoking history. She has never used smokeless tobacco.  ETOH:   reports no history of alcohol use. Family History:       Problem Relation Age of Onset    Heart Attack Father 61    Heart Attack Sister     Heart Disease Brother          Objective:    VITALS:  Temp: 97.7 °F (36.5 °C)  BP: (!) 158/97  Resp: 16  Pulse: 81  SpO2: 99 %    Weight  Wt Readings from Last 3 Encounters:   01/16/22 149 lb 9.6 oz (67.9 kg)   10/16/21 151 lb 4.8 oz (68.6 kg)   05/21/21 151 lb 10.8 oz (68.8 kg)     Body mass index is 25.68 kg/m².  -----------------------------------------------------------------  EXAM:  GEN:    Awake, alert and oriented x3. EYES:  EOMI, pupils equal   NECK: Supple. No lymphadenopathy. No carotid bruit  CVS:    regular rate and rhythm, no audible murmur  PULM:  diminished with bilateral expiratory wheezing, no acute respiratory distress  ABD:    Bowels sounds normal.  Abdomen is soft. No distention. no tenderness to palpation.    EXT:   no edema bilaterally . No calf tenderness. NEURO: Moves all extremities. Motor and sensory are grossly intact  SKIN:  No rashes. No skin lesions.    -----------------------------------------------------------------    DATA:  CBC:   Lab Results   Component Value Date    WBC 9.5 01/15/2022    RBC 4.75 01/15/2022    HGB 13.7 01/15/2022    HCT 43.0 01/15/2022    MCV 90.5 01/15/2022     01/15/2022      CMP:   Lab Results   Component Value Date    GLUCOSE 99 01/15/2022    BUN 15 01/15/2022    CREATININE 0.63 01/15/2022     01/15/2022    K 3.8 01/15/2022    CALCIUM 9.8 01/15/2022     01/15/2022    CO2 28 01/15/2022    PROT 6.9 01/15/2022    LABALBU 4.1 01/15/2022    BILITOT 0.38 01/15/2022    ALKPHOS 66 01/15/2022    ALT 13 01/15/2022    AST 18 01/15/2022     High Sensitivity Troponin:  Recent Labs     01/15/22  1230   TROPHS 28*     ABGs:   Lab Results   Component Value Date    PHART 7.383 01/15/2022    WVS1FYQ 45.2 01/15/2022    PO2ART 66.5 01/15/2022    AOL3KHR 26.3 01/15/2022    T2EMTXLA 92.8 01/15/2022    FIO2 NOT REPORTED 01/15/2022           EKG reviewed: my interpretation is: sinus tachycardia with left axis deviation and non-specific ST changes        Imaging Data:  CT CHEST PULMONARY EMBOLISM W CONTRAST   Final Result   Patchy bilateral interstitial, airspace and ground-glass infiltrates most   notably in the lung bases compatible with bilateral pneumonia      Coronary artery/atherosclerotic disease      No obvious evidence of pulmonary embolism, however the segmental and   subsegmental branches are not well opacified. Multiple nonspecific lymph nodes seen in the middle mediastinum, subcarinal   region and guillermo, possibly reactive and relatively stable from prior exam         XR CHEST PORTABLE   Final Result   Bilateral interstitial groundglass opacities likely represent viral   pneumonia, COVID-19 is not excluded. Assessment / Plan:      This patient requires inpatient admission because of community acquired bacterial vs viral pneumonia  · Estimated length of stay is 5 days  · Discussed patient's symptoms and data results including labs and imaging studies with the ER MD at time of admission  · Rapid covid negative but imaging suggests viral pneumonia  · Covid PCR is pending  · While awaiting results, will tx with Rocephin and Azithromycin  · Dexamethasone 6 mg po QD x 10 days  · Hypoxia without respiratory distress  · Supplemental O2  · COPD  · nebs  · Chronic pain syndrome   · On dilaudid infusion pump and PRN Norco  · Peptic ulcer prophylaxis: Pepcid  · DVT prophylaxis: Lovenox  · High risk medications: dilaudid infusion pump      Core Measures:     · Decubitus ulcer present on admission: No  · CODE STATUS: FULL CODE  · Nutrition Status: fair   · Physical therapy: Yes   · Old Charts reviewed:  Yes  · EKG Reviewed: yes  · Advance Directive Addressed: Yes - in chart    Ailyn Horton MD , M.D.  1/16/2022  11:54 AM

## 2022-01-17 LAB
ABSOLUTE EOS #: 0.03 K/UL (ref 0–0.44)
ABSOLUTE IMMATURE GRANULOCYTE: 0.03 K/UL (ref 0–0.3)
ABSOLUTE LYMPH #: 1.29 K/UL (ref 1.1–3.7)
ABSOLUTE MONO #: 0.71 K/UL (ref 0.1–1.2)
ADENOVIRUS PCR: NOT DETECTED
ALBUMIN SERPL-MCNC: 3.5 G/DL (ref 3.5–5.2)
ALBUMIN/GLOBULIN RATIO: 1.2 (ref 1–2.5)
ALP BLD-CCNC: 52 U/L (ref 35–104)
ALT SERPL-CCNC: 12 U/L (ref 5–33)
ANION GAP SERPL CALCULATED.3IONS-SCNC: 12 MMOL/L (ref 9–17)
AST SERPL-CCNC: 15 U/L
BASOPHILS # BLD: 0 % (ref 0–2)
BASOPHILS ABSOLUTE: <0.03 K/UL (ref 0–0.2)
BILIRUB SERPL-MCNC: 0.28 MG/DL (ref 0.3–1.2)
BORDETELLA PARAPERTUSSIS: NOT DETECTED
BORDETELLA PERTUSSIS PCR: NOT DETECTED
BUN BLDV-MCNC: 13 MG/DL (ref 8–23)
BUN/CREAT BLD: 22 (ref 9–20)
C-REACTIVE PROTEIN: 87 MG/L (ref 0–5)
CALCIUM SERPL-MCNC: 9.3 MG/DL (ref 8.6–10.4)
CHLAMYDIA PNEUMONIAE BY PCR: NOT DETECTED
CHLORIDE BLD-SCNC: 100 MMOL/L (ref 98–107)
CO2: 26 MMOL/L (ref 20–31)
CORONAVIRUS 229E PCR: NOT DETECTED
CORONAVIRUS HKU1 PCR: NOT DETECTED
CORONAVIRUS NL63 PCR: NOT DETECTED
CORONAVIRUS OC43 PCR: NOT DETECTED
CREAT SERPL-MCNC: 0.58 MG/DL (ref 0.5–0.9)
D-DIMER QUANTITATIVE: 0.86 MG/L FEU (ref 0–0.59)
DIFFERENTIAL TYPE: ABNORMAL
EOSINOPHILS RELATIVE PERCENT: 0 % (ref 1–4)
GFR AFRICAN AMERICAN: >60 ML/MIN
GFR NON-AFRICAN AMERICAN: >60 ML/MIN
GFR SERPL CREATININE-BSD FRML MDRD: ABNORMAL ML/MIN/{1.73_M2}
GFR SERPL CREATININE-BSD FRML MDRD: ABNORMAL ML/MIN/{1.73_M2}
GLUCOSE BLD-MCNC: 90 MG/DL (ref 70–99)
HCT VFR BLD CALC: 39.1 % (ref 36.3–47.1)
HEMOGLOBIN: 12.3 G/DL (ref 11.9–15.1)
HUMAN METAPNEUMOVIRUS PCR: NOT DETECTED
IMMATURE GRANULOCYTES: 0 %
INFLUENZA A BY PCR: NOT DETECTED
INFLUENZA A H1 (2009) PCR: NORMAL
INFLUENZA A H1 PCR: NORMAL
INFLUENZA A H3 PCR: NORMAL
INFLUENZA B BY PCR: NOT DETECTED
LYMPHOCYTES # BLD: 13 % (ref 24–43)
MCH RBC QN AUTO: 28.7 PG (ref 25.2–33.5)
MCHC RBC AUTO-ENTMCNC: 31.5 G/DL (ref 28.4–34.8)
MCV RBC AUTO: 91.4 FL (ref 82.6–102.9)
MONOCYTES # BLD: 7 % (ref 3–12)
MYCOPLASMA PNEUMONIAE PCR: NOT DETECTED
NRBC AUTOMATED: 0 PER 100 WBC
PARAINFLUENZA 1 PCR: NOT DETECTED
PARAINFLUENZA 2 PCR: NOT DETECTED
PARAINFLUENZA 3 PCR: NOT DETECTED
PARAINFLUENZA 4 PCR: NOT DETECTED
PDW BLD-RTO: 17.3 % (ref 11.8–14.4)
PLATELET # BLD: 219 K/UL (ref 138–453)
PLATELET ESTIMATE: ABNORMAL
PMV BLD AUTO: 9.8 FL (ref 8.1–13.5)
POTASSIUM SERPL-SCNC: 4 MMOL/L (ref 3.7–5.3)
RBC # BLD: 4.28 M/UL (ref 3.95–5.11)
RBC # BLD: ABNORMAL 10*6/UL
RESP SYNCYTIAL VIRUS PCR: NOT DETECTED
RHINO/ENTEROVIRUS PCR: NOT DETECTED
SARS-COV-2, PCR: NOT DETECTED
SEG NEUTROPHILS: 80 % (ref 36–65)
SEGMENTED NEUTROPHILS ABSOLUTE COUNT: 8.08 K/UL (ref 1.5–8.1)
SODIUM BLD-SCNC: 138 MMOL/L (ref 135–144)
SPECIMEN DESCRIPTION: NORMAL
TOTAL PROTEIN: 6.5 G/DL (ref 6.4–8.3)
WBC # BLD: 10.2 K/UL (ref 3.5–11.3)
WBC # BLD: ABNORMAL 10*3/UL

## 2022-01-17 PROCEDURE — 80053 COMPREHEN METABOLIC PANEL: CPT

## 2022-01-17 PROCEDURE — 6370000000 HC RX 637 (ALT 250 FOR IP): Performed by: INTERNAL MEDICINE

## 2022-01-17 PROCEDURE — 0202U NFCT DS 22 TRGT SARS-COV-2: CPT

## 2022-01-17 PROCEDURE — 85025 COMPLETE CBC W/AUTO DIFF WBC: CPT

## 2022-01-17 PROCEDURE — 85379 FIBRIN DEGRADATION QUANT: CPT

## 2022-01-17 PROCEDURE — 6360000002 HC RX W HCPCS: Performed by: NURSE PRACTITIONER

## 2022-01-17 PROCEDURE — 6360000002 HC RX W HCPCS: Performed by: INTERNAL MEDICINE

## 2022-01-17 PROCEDURE — 2700000000 HC OXYGEN THERAPY PER DAY

## 2022-01-17 PROCEDURE — 86140 C-REACTIVE PROTEIN: CPT

## 2022-01-17 PROCEDURE — 36415 COLL VENOUS BLD VENIPUNCTURE: CPT

## 2022-01-17 PROCEDURE — 2580000003 HC RX 258: Performed by: INTERNAL MEDICINE

## 2022-01-17 PROCEDURE — 97116 GAIT TRAINING THERAPY: CPT

## 2022-01-17 PROCEDURE — 94761 N-INVAS EAR/PLS OXIMETRY MLT: CPT

## 2022-01-17 PROCEDURE — 6360000002 HC RX W HCPCS: Performed by: EMERGENCY MEDICINE

## 2022-01-17 PROCEDURE — 1200000000 HC SEMI PRIVATE

## 2022-01-17 PROCEDURE — 94640 AIRWAY INHALATION TREATMENT: CPT

## 2022-01-17 PROCEDURE — 97530 THERAPEUTIC ACTIVITIES: CPT

## 2022-01-17 PROCEDURE — 97166 OT EVAL MOD COMPLEX 45 MIN: CPT

## 2022-01-17 RX ORDER — ALBUTEROL SULFATE 2.5 MG/3ML
2.5 SOLUTION RESPIRATORY (INHALATION) EVERY 4 HOURS PRN
Status: DISCONTINUED | OUTPATIENT
Start: 2022-01-17 | End: 2022-01-18

## 2022-01-17 RX ADMIN — FAMOTIDINE 20 MG: 20 TABLET, FILM COATED ORAL at 20:33

## 2022-01-17 RX ADMIN — ALBUTEROL SULFATE 2.5 MG: 2.5 SOLUTION RESPIRATORY (INHALATION) at 20:56

## 2022-01-17 RX ADMIN — RIVAROXABAN 20 MG: 20 TABLET, FILM COATED ORAL at 08:22

## 2022-01-17 RX ADMIN — TRAZODONE HYDROCHLORIDE 200 MG: 50 TABLET ORAL at 20:33

## 2022-01-17 RX ADMIN — Medication 2000 UNITS: at 08:22

## 2022-01-17 RX ADMIN — ASPIRIN 81 MG: 81 TABLET, COATED ORAL at 08:22

## 2022-01-17 RX ADMIN — HYDROXYCHLOROQUINE SULFATE 200 MG: 200 TABLET ORAL at 08:22

## 2022-01-17 RX ADMIN — FUROSEMIDE 20 MG: 20 TABLET ORAL at 08:22

## 2022-01-17 RX ADMIN — SODIUM CHLORIDE, PRESERVATIVE FREE 10 ML: 5 INJECTION INTRAVENOUS at 08:23

## 2022-01-17 RX ADMIN — PANTOPRAZOLE SODIUM 40 MG: 40 TABLET, DELAYED RELEASE ORAL at 07:11

## 2022-01-17 RX ADMIN — METOPROLOL 25 MG: 25 TABLET ORAL at 08:22

## 2022-01-17 RX ADMIN — DEXAMETHASONE 6 MG: 4 TABLET ORAL at 08:22

## 2022-01-17 RX ADMIN — ALBUTEROL SULFATE 2.5 MG: 2.5 SOLUTION RESPIRATORY (INHALATION) at 11:27

## 2022-01-17 RX ADMIN — LEVOTHYROXINE SODIUM 150 MCG: 150 TABLET ORAL at 07:10

## 2022-01-17 RX ADMIN — ALBUTEROL SULFATE 2.5 MG: 2.5 SOLUTION RESPIRATORY (INHALATION) at 15:48

## 2022-01-17 RX ADMIN — Medication 2 PUFF: at 06:02

## 2022-01-17 RX ADMIN — FAMOTIDINE 20 MG: 20 TABLET, FILM COATED ORAL at 08:23

## 2022-01-17 RX ADMIN — POTASSIUM CHLORIDE 20 MEQ: 750 TABLET, EXTENDED RELEASE ORAL at 20:33

## 2022-01-17 RX ADMIN — HYDROCODONE BITARTRATE AND ACETAMINOPHEN 1 TABLET: 10; 325 TABLET ORAL at 20:33

## 2022-01-17 RX ADMIN — DULOXETINE 60 MG: 60 CAPSULE, DELAYED RELEASE ORAL at 08:22

## 2022-01-17 RX ADMIN — SODIUM CHLORIDE, PRESERVATIVE FREE 10 ML: 5 INJECTION INTRAVENOUS at 20:33

## 2022-01-17 RX ADMIN — METOPROLOL 25 MG: 25 TABLET ORAL at 20:33

## 2022-01-17 RX ADMIN — AZITHROMYCIN MONOHYDRATE 500 MG: 500 INJECTION, POWDER, LYOPHILIZED, FOR SOLUTION INTRAVENOUS at 16:42

## 2022-01-17 RX ADMIN — CEFTRIAXONE 1000 MG: 1 INJECTION, POWDER, FOR SOLUTION INTRAMUSCULAR; INTRAVENOUS at 12:19

## 2022-01-17 RX ADMIN — HYDROCODONE BITARTRATE AND ACETAMINOPHEN 1 TABLET: 10; 325 TABLET ORAL at 14:14

## 2022-01-17 RX ADMIN — POTASSIUM CHLORIDE 20 MEQ: 750 TABLET, EXTENDED RELEASE ORAL at 08:22

## 2022-01-17 ASSESSMENT — PAIN SCALES - GENERAL
PAINLEVEL_OUTOF10: 2
PAINLEVEL_OUTOF10: 2
PAINLEVEL_OUTOF10: 7
PAINLEVEL_OUTOF10: 7

## 2022-01-17 ASSESSMENT — PAIN DESCRIPTION - PAIN TYPE
TYPE: CHRONIC PAIN

## 2022-01-17 ASSESSMENT — PAIN DESCRIPTION - ORIENTATION
ORIENTATION: LOWER

## 2022-01-17 ASSESSMENT — PAIN DESCRIPTION - DIRECTION
RADIATING_TOWARDS: BACK
RADIATING_TOWARDS: BACK

## 2022-01-17 ASSESSMENT — PAIN DESCRIPTION - LOCATION
LOCATION: BACK

## 2022-01-17 ASSESSMENT — PAIN DESCRIPTION - FREQUENCY
FREQUENCY: CONTINUOUS
FREQUENCY: CONTINUOUS

## 2022-01-17 ASSESSMENT — PAIN DESCRIPTION - ONSET
ONSET: ON-GOING
ONSET: ON-GOING

## 2022-01-17 ASSESSMENT — PAIN DESCRIPTION - DESCRIPTORS
DESCRIPTORS: ACHING

## 2022-01-17 NOTE — PROGRESS NOTES
Discussed discharge plans with the patient. Patient is a 77year old female here with· community acquired bacterial vs viral pneumonia. She is alert , oriented , pleasant , cooperative during our conversation.     Patient is  and lives at home with her . Ingrid Tipton has a walker, wheelchair, shower chair, and grabs at home if needed. . The patient does the cleaning and the  does the cooking. She is independent with her ADL's. Her  manages her  medications and her  also provides the transportation. She has no outside services in the home.     Her PCP is Dior Petit MD. She has medical insurance that helps with medication costs.     The discharge plan is home with no services at this time. She does not have advance directives.  LSW to monitor and assist with any needs or concerns as they arise.     BUZZ Ordonez

## 2022-01-17 NOTE — PROGRESS NOTES
Pt sitting up in the chair when writer entered the room. Pt is A&O x4. Vitals and assessment as charted. Pt denies any further needs at this time. Call light within reach.

## 2022-01-17 NOTE — PROGRESS NOTES
Physical Therapy  Facility/Department: [facility] AT THE [facility] MED SURG  Daily Treatment Note  NAME: Dylan Richards  : 1945  MRN: [id number]    Date of Service: 2022    Discharge Recommendations:  Continue to assess pending progress        Assessment   Body structures, Functions, Activity limitations: Decreased functional mobility ; Decreased balance;Decreased strength;Decreased high-level IADLs;Decreased safe awareness;Decreased endurance  Treatment Diagnosis: general weakness  Specific instructions for Next Treatment: 2x/daily except weekends 1x/daily  Prognosis: Good  Decision Making: Medium Complexity  PT Education: Goals; General Safety;Gait Training;PT Role  REQUIRES PT FOLLOW UP: Yes  Activity Tolerance  Activity Tolerance: Patient Tolerated treatment well     Patient Diagnosis(es): The encounter diagnosis was COPD exacerbation ([facility] has a past medical history of Chronic pain syndrome, COPD (chronic obstructive pulmonary disease) ([facility] [address] Depression, GERD (gastroesophageal reflux disease), Hypothyroidism, and Osteoporosis. has a past surgical history that includes Hysterectomy; back surgery; Breast surgery; Carpal tunnel release; joint replacement; joint replacement; fracture surgery (Left, 2018); and Wrist fracture surgery (Left, 2018). Restrictions  Restrictions/Precautions  Restrictions/Precautions: General Precautions,Fall Risk  Subjective   General  Chart Reviewed: Yes  Family / Caregiver Present: Yes  Referring Practitioner: Dr. Nida Davis  Subjective  Subjective: Pt seated in chair and is agreeable to therapy. Reports she is experiencing LBP that is chronic.   Pain Screening  Patient Currently in Pain: Yes  Vital Signs  Patient Currently in Pain: Yes       Orientation  Orientation  Overall Orientation Status: Within Normal Limits  Cognition      Objective      Transfers  Sit to Stand: Contact guard assistance  Stand to sit: Contact guard assistance  Ambulation  Ambulation?: Yes  Ambulation 1  Surface: level tile  Device: No Device  Assistance: Contact guard assistance  Distance: 40 ft x1, 80 ft x1  Stairs/Curb  Stairs?: No     Balance  Posture: Fair  Sitting - Static: Good  Sitting - Dynamic: Good  Standing - Static: Fair  Standing - Dynamic: Fair     Goals  Short term goals  Time Frame for Short term goals: 20 days  Short term goal 1: Pt will be educated on her POC  Short term goal 2: Pt will perform all transfers SBA in order to increase independence  Short term goal 3: Pt will ambulate >/=100 feet with LRAD SBA in order to return to PLOF  Short term goal 4: Pt will increase dynamic standing balance to Fair + in order to reduce fall risk  Patient Goals   Patient goals : \"to feel better and go home\"    Plan    Plan  Times per week: 7x/wk  Times per day: Twice a day  Specific instructions for Next Treatment: 2x/daily except weekends 1x/daily  Current Treatment Recommendations: Strengthening,Neuromuscular Re-education,Home Exercise Program,Manual Therapy - Soft Tissue Mobilization,Safety Education & Training,Balance Training,Endurance Training,Patient/Caregiver Education & Training,Functional Mobility Training,Transfer Training,Gait Training,Stair training  Safety Devices  Type of devices: Left in chair,Call light within reach     Therapy Time   Individual Concurrent Group Co-treatment   Time In 1610         Time Out 1621         Minutes [place], [place]

## 2022-01-17 NOTE — CONSULTS
Comprehensive Nutrition Assessment    Type and Reason for Visit:  Initial,Consult    Nutrition Recommendations/Plan:   1. Continue current diet. 2. Start 4 oz vanilla ensure enlive TID with meals. Nutrition Assessment:  Inadequate oral intakes r/t impaired respiratory function aeb Pt reports of decreased PO since admission, ~ 50%, weight loss 8.5% x 5 months. States \"I don't eat well when I am in the hospital.\" Agrees to try vanilla ensure. Malnutrition Assessment:  Malnutrition Status: At risk for malnutrition (Comment)    Context:  Acute Illness     Findings of the 6 clinical characteristics of malnutrition:  Energy Intake:  Mild decrease in energy intake (Comment) (decreased PO since admission)  Weight Loss:   (8.5% weight loss x 5 months)     Body Fat Loss:  No significant body fat loss     Muscle Mass Loss:  No significant muscle mass loss    Fluid Accumulation:  No significant fluid accumulation     Strength:  Not Performed    Estimated Daily Nutrient Needs:  Energy (kcal):  2524-7321 (20-23/kg); Weight Used for Energy Requirements:  Current     Protein (g):  72-83g (1.3-1.45g/kg); Weight Used for Protein Requirements:  Ideal        Fluid (ml/day):  1564 ml (23/kg); Method Used for Fluid Requirements:  ml/Kg      Nutrition Related Findings:  appears well nourished      Wounds:  None       Current Nutrition Therapies:    ADULT DIET; Regular    Anthropometric Measures:  · Height: 5' 4\" (162.6 cm)  · Current Body Weight: 149 lb 12.8 oz (67.9 kg)   · Admission Body Weight: 149 lb 9.6 oz (67.9 kg)    · Usual Body Weight: 151 lb 4.8 oz (68.6 kg) (10/16/21)     · Ideal Body Weight: 120 lbs; % Ideal Body Weight 124.8 %   · BMI: 25.7  · BMI Categories: Overweight (BMI 25.0-29. 9)       Nutrition Diagnosis:   · Inadequate oral intake related to impaired respiratory function as evidenced by intake 26-50%      Nutrition Interventions:   Food and/or Nutrient Delivery:  Continue Current Diet,Start Oral Nutrition Supplement  Nutrition Education/Counseling:  No recommendation at this time   Coordination of Nutrition Care:  Continue to monitor while inpatient    Goals:  PO > 75% of meals and supplements       Recent Labs     01/15/22  1230 01/17/22  0535    138   K 3.8 4.0    100   CO2 28 26   BUN 15 13   CREATININE 0.63 0.58   GLUCOSE 99 90   ALT 13 12   ALKPHOS 66 52   GFR                            Lab Results   Component Value Date    LABALBU 3.5 01/17/2022      Nutrition Monitoring and Evaluation:   Behavioral-Environmental Outcomes:  None Identified   Food/Nutrient Intake Outcomes:  Food and Nutrient Intake,Supplement Intake  Physical Signs/Symptoms Outcomes:  Biochemical Data,Weight     Discharge Planning:    Continue current diet     Electronically signed by Faby Carrillo RD, LD on 1/17/22 at 7:48 AM EST    Contact: [phone]

## 2022-01-17 NOTE — PROGRESS NOTES
Patient assisted to chair, complains of back pain. Denies needs at this time. Vitals and assessment complete.

## 2022-01-17 NOTE — PROGRESS NOTES
Progress Note    SUBJECTIVE:    Patient seen for f/u of Community acquired pneumonia. She sitting up in chair alert and oriented no distress. No hypoxia. Tolerating diet well. ROS:   Constitutional: negative  for fevers, and negative for chills. Respiratory: negative for shortness of breath, positive for cough, and negative for wheezing  Cardiovascular: negative for chest pain, and negative for palpitations  Gastrointestinal: negative for abdominal pain, negative for nausea,negative for vomiting, negative for diarrhea, and negative for constipation     All other systems were reviewed with the patient and are negative unless otherwise stated in HPI      OBJECTIVE:      Vitals:   Vitals:    01/17/22 0700   BP: 125/63   Pulse: 66   Resp: 16   Temp: 98.4 °F (36.9 °C)   SpO2: 92%     Weight: 149 lb 12.8 oz (67.9 kg)   Height: 5' 4\" (162.6 cm)     Weight  Wt Readings from Last 3 Encounters:   01/17/22 149 lb 12.8 oz (67.9 kg)   10/16/21 151 lb 4.8 oz (68.6 kg)   05/21/21 151 lb 10.8 oz (68.8 kg)     Body mass index is 25.71 kg/m². 24HR INTAKE/OUTPUT:      Intake/Output Summary (Last 24 hours) at 1/17/2022 0758  Last data filed at 1/17/2022 0348  Gross per 24 hour   Intake 600 ml   Output 1550 ml   Net -950 ml     -----------------------------------------------------------------  Exam:    GEN:    Awake, alert and oriented x3. EYES:  EOMI, pupils equal   NECK: Supple. No lymphadenopathy. No carotid bruit  CVS:    regular rate and rhythm, no audible murmur  PULM:  CTA, no wheezes, rales or rhonchi, no acute respiratory distress  ABD:    Bowels sounds normal.  Abdomen is soft. No distention. no tenderness to palpation. EXT:   no edema bilaterally . No calf tenderness. NEURO: Moves all extremities. Motor and sensory are grossly intact  SKIN:  No rashes.   No skin lesions.    -----------------------------------------------------------------    Diagnostic Data:      Complete Blood Count:   Recent Labs 01/15/22  1230 01/17/22  0535   WBC 9.5 10.2   RBC 4.75 4.28   HGB 13.7 12.3   HCT 43.0 39.1   MCV 90.5 91.4   MCH 28.8 28.7   MCHC 31.9 31.5   RDW 17.4* 17.3*    219   MPV 9.8 9.8        Last 3 Blood Glucose:   Recent Labs     01/15/22  1230 01/17/22  0535   GLUCOSE 99 90        Comprehensive Metabolic Profile:   Recent Labs     01/15/22  1230 01/17/22  0535    138   K 3.8 4.0    100   CO2 28 26   BUN 15 13   CREATININE 0.63 0.58   GLUCOSE 99 90   CALCIUM 9.8 9.3   PROT 6.9 6.5   LABALBU 4.1 3.5   BILITOT 0.38 0.28*   ALKPHOS 66 52   AST 18 15   ALT 13 12        Urinalysis:   Lab Results   Component Value Date    NITRU NEGATIVE 10/14/2021    COLORU Yellow 10/14/2021    PHUR 6.0 10/14/2021    WBCUA None 10/14/2021    RBCUA None 10/14/2021    MUCUS NOT REPORTED 10/14/2021    TRICHOMONAS NOT REPORTED 10/14/2021    YEAST NOT REPORTED 10/14/2021    BACTERIA NOT REPORTED 10/14/2021    SPECGRAV 1.020 10/14/2021    LEUKOCYTESUR NEGATIVE 10/14/2021    UROBILINOGEN Normal 10/14/2021    BILIRUBINUR NEGATIVE 10/14/2021    GLUCOSEU NEGATIVE 10/14/2021    KETUA NEGATIVE 10/14/2021    AMORPHOUS NOT REPORTED 10/14/2021       HgBA1c:  No results found for: LABA1C    Lactic Acid:   Lab Results   Component Value Date    LACTA 1.1 05/20/2021    LACTA 0.7 12/26/2020    LACTA 5.0 10/22/2020        Troponin: No results for input(s): TROPONINI in the last 72 hours. CRP:    Recent Labs     01/16/22  1217   .8*         Radiology/Imaging:  CT CHEST PULMONARY EMBOLISM W CONTRAST   Final Result   Patchy bilateral interstitial, airspace and ground-glass infiltrates most   notably in the lung bases compatible with bilateral pneumonia      Coronary artery/atherosclerotic disease      No obvious evidence of pulmonary embolism, however the segmental and   subsegmental branches are not well opacified.       Multiple nonspecific lymph nodes seen in the middle mediastinum, subcarinal   region and guillermo, possibly reactive and relatively stable from prior exam         XR CHEST PORTABLE   Final Result   Bilateral interstitial groundglass opacities likely represent viral   pneumonia, COVID-19 is not excluded.                ASSESSMENT / PLAN:  Community acquired pneumonia  · Continue current therapy   · Covid-Neg x 2  · Viral PCR  · Dexamethasone 6 mg daily x10 days  · Continue IV Rocephin and Zithromax until viral panel returns  · CT suggests viral pneumonia  · Hypoxia  · Resolved  · COPD  · Nebs  · Chronic pain syndrome  · Currently on Dilaudid infusion pump with as needed Norco  · Nutrition status:   · Well developed, well nourished with no malnutrition  · Dietician consult initiated  · Hospital Prophylaxis:   · DVT: Lovenox   · Stress Ulcer: H2 Blocker   · High risk medications: none   · Disposition:    · Discharge plan is home [address] [place] [address], RYAN - CNP , RYAN, NP-C  Hospitalist Medicine        1/17/2022, 7:58 AM

## 2022-01-17 NOTE — PROGRESS NOTES
Physical Therapy    Facility/Department: [facility] AT THE [facility] MED SURG  Initial Assessment    NAME: Edyta Ferguson  : 1945  MRN: [id number]    Date of Service: 2022    Discharge Recommendations:  Continue to assess pending progress        Assessment   Body structures, Functions, Activity limitations: Decreased functional mobility ; Decreased balance;Decreased strength;Decreased high-level IADLs;Decreased safe awareness;Decreased endurance  Assessment: Pt is a 77year old female that was hospitalized for pneumonia. Pt presents with general weakness, decreased ambulation tolerance, and reduced dynamic standing balance. Pt will benefit from skilled PT in order to adress these deficits. Treatment Diagnosis: general weakness  Specific instructions for Next Treatment: 2x/daily except weekends 1x/daily  Prognosis: Good  Decision Making: Medium Complexity  PT Education: Goals;PT Role;Plan of Care  REQUIRES PT FOLLOW UP: Yes  Activity Tolerance  Activity Tolerance: Patient Tolerated treatment well       Patient Diagnosis(es): The encounter diagnosis was COPD exacerbation ([facility] [address] has a past medical history of Chronic pain syndrome, COPD (chronic obstructive pulmonary disease) ([address] Depression, GERD (gastroesophageal reflux disease), Hypothyroidism, and Osteoporosis. has a past surgical history that includes Hysterectomy; back surgery; Breast surgery; Carpal tunnel release; joint replacement; joint replacement; fracture surgery (Left, 2018); and Wrist fracture surgery (Left, 2018).     Restrictions  Restrictions/Precautions  Restrictions/Precautions: General Precautions,Fall Risk  Vision/Hearing  Vision: Within Functional Limits  Hearing: Within functional limits     Subjective  General  Chart Reviewed: Yes  Family / Caregiver Present: No  Referring Practitioner: Dr. Cassandra Eagle  Referral Date : 22  Diagnosis: pneumonia  Subjective  Subjective: pt reports chronic LBP  Pain Screening  Patient Currently in Pain: Yes          Orientation  Orientation  Overall Orientation Status: Within Normal Limits  Social/Functional History  Social/Functional History  Lives With: Spouse  Type of Home: House  Home Layout: Able to Live on Main level with bedroom/bathroom  Home Access: Stairs to enter with rails  Entrance Stairs - Number of Steps: 2  Entrance Stairs - Rails: Both  Bathroom Shower/Tub: Walk-in shower  Bathroom Toilet: Handicap height  Bathroom Equipment: Grab bars in shower,Shower chair,Grab bars around toilet  Home Equipment: [employer] Help From: Family  ADL Assistance: Needs assistance  Homemaking Assistance: Needs assistance  Homemaking Responsibilities: Yes  Ambulation Assistance: Independent  Transfer Assistance: Independent  Active : No  Patient's  Info: Patient states she hasnt driven recently due to having seizures in the past.    Objective    AROM RLE (degrees)  RLE AROM: WFL  AROM LLE (degrees)  LLE AROM : WFL  Strength RLE  Comment: grossly 4-/5  Strength LLE  Comment: grossly 4-/5        Bed mobility  Rolling to Right: Contact guard assistance  Supine to Sit: Contact guard assistance  Transfers  Sit to Stand: Contact guard assistance  Stand to sit: Contact guard assistance  Ambulation  Ambulation?: Yes  Ambulation 1  Surface: level tile  Device: No Device  Assistance: Contact guard assistance  Distance: 20 feet     Balance  Posture: Fair  Sitting - Static: Good  Sitting - Dynamic: Good  Standing - Static: Fair  Standing - Dynamic: Fair        Plan   Plan  Times per week: 7x/wk  Times per day: Twice a day  Specific instructions for Next Treatment: 2x/daily except weekends 1x/daily  Current Treatment Recommendations: Strengthening,Neuromuscular Re-education,Home Exercise Program,Manual Therapy - Soft Tissue Mobilization,Safety Education & Training,Balance Training,Endurance Training,Patient/Caregiver Education & Training,Functional Mobility Training,Transfer Training,Gait Training,Stair training  Safety Devices  Type of devices: Left in chair,Call light within reach    AM-PAC Score  AM-PAC Inpatient Mobility Raw Score : 18 (01/17/22 0917)  AM-PAC Inpatient T-Scale Score : 43.63 (01/17/22 0917)  Mobility Inpatient CMS 0-100% Score: 46.58 (01/17/22 0917)  Mobility Inpatient CMS G-Code Modifier : CK (01/17/22 0917)          Goals  Short term goals  Time Frame for Short term goals: 20 days  Short term goal 1: Pt will be educated on her POC  Short term goal 2: Pt will perform all transfers SBA in order to increase independence  Short term goal 3: Pt will ambulate >/=100 feet with LRAD SBA in order to return to PLOF  Short term goal 4: Pt will increase dynamic standing balance to Fair + in order to reduce fall risk  Patient Goals   Patient goals : \"to feel better and go home\"       Therapy Time   Individual Concurrent Group Co-treatment   Time In 0820         Time Out 0837         Minutes [address], PT

## 2022-01-17 NOTE — PROGRESS NOTES
While pt is sleeping her SpO2 is 86-88%. Writer placed 2L NC on at this time. Will continue to monitor.

## 2022-01-17 NOTE — PROGRESS NOTES
Occupational Therapy   Occupational Therapy Initial Assessment  Date: 2022   Patient Name: Martha Liriano  MRN: [id number]     : 1945    Date of Service: 2022    Discharge Recommendations:  Continue to assess pending progress,Home with assist PRN       Assessment   Performance deficits / Impairments: Decreased functional mobility ; Decreased ADL status; Decreased ROM; Decreased strength;Decreased endurance;Decreased balance  Treatment Diagnosis: M62.81 Generalized weakness  Prognosis: Good  Decision Making: Medium Complexity  OT Education: OT Role;Plan of Care  Patient Education: Pt educated on OT role and plan for tx. Barriers to Learning: none noted  REQUIRES OT FOLLOW UP: Yes  Activity Tolerance  Activity Tolerance: Patient Tolerated treatment well  Safety Devices  Safety Devices in place: Yes  Type of devices: Left in chair;Call light within reach           Patient Diagnosis(es): The encounter diagnosis was COPD exacerbation ([facility] [address] has a past medical history of Chronic pain syndrome, COPD (chronic obstructive pulmonary disease) ([facility] [address] Depression, GERD (gastroesophageal reflux disease), Hypothyroidism, and Osteoporosis. has a past surgical history that includes Hysterectomy; back surgery; Breast surgery; Carpal tunnel release; joint replacement; joint replacement; fracture surgery (Left, 2018); and Wrist fracture surgery (Left, 2018). Treatment Diagnosis: M62.81 Generalized weakness      Restrictions       Subjective   General  Referring Practitioner: Dr. Loyda Kulkarni  Diagnosis: COPD  Subjective  Subjective: Pt reports chronic back pain at 7/10 this date. Pt states she takes Norco for pain. General Comment  Comments: Pt seated upright in chair upon therapist arrival, agreeable to OT/PT eval at this time.   Patient Currently in Pain: Yes  Pain Assessment  Pain Assessment: 0-10  Pain Level: 2  Pain Type: Chronic pain  Pain Location: Back  Pain Orientation: Lower  Pain Descriptors: Aching  Vital Signs  Temp: 98.4 °F (36.9 °C)  Temp Source: Temporal  Pulse: 66  Heart Rate Source: Monitor; Apical  Resp: 16  BP: 125/63  BP Location: Right upper arm  MAP (mmHg): 82  Patient Position: Sitting  Level of Consciousness: Alert (0)  MEWS Score: 1  Patient Currently in Pain: Yes  Height and Weight  Height: 5' 4\" (162.6 cm)  Oxygen Therapy  SpO2: 92 %  Pulse Oximeter Device Mode: Continuous  Pulse Oximeter Device Location: Finger  O2 Device: None (Room air)  Skin Assessment: Clean, dry, & intact  Social/Functional History  Social/Functional History  Lives With: Spouse  Type of Home: House  Home Layout: Able to Live on Main level with bedroom/bathroom  Home Access: Stairs to enter with rails  Entrance Stairs - Number of Steps: 2  Entrance Stairs - Rails: Both  Bathroom Shower/Tub: Walk-in shower  Bathroom Toilet: Handicap height  Bathroom Equipment: Grab bars in shower,Shower chair,Grab bars around toilet  Home Equipment: Rolling walker,Cane  Receives Help From: Family  ADL Assistance: Needs assistance  Homemaking Assistance: Needs assistance  Homemaking Responsibilities: Yes  Ambulation Assistance: Independent  Transfer Assistance: Independent  Active : No       Objective        Orientation  Overall Orientation Status: Within Functional Limits     Balance  Sitting Balance: Supervision  Standing Balance: Contact guard assistance  Functional Mobility  Functional - Mobility Device: No device  Activity: Other (in room)  Assist Level: Contact guard assistance  Functional Mobility Comments: Pt demo functional mobility in room with CGA, slightly unsteady gait but no LOB noted.   ADL  Feeding: Modified independent ;Setup  Grooming: Contact guard assistance;Setup  UE Bathing: Contact guard assistance;Setup  LE Bathing: Contact guard assistance;Setup  UE Dressing: Minimal assistance;Setup  LE Dressing: Setup;Contact guard assistance  Toileting: Contact guard assistance           Transfers  Sit to stand: Contact guard assistance  Stand to sit: Contact guard assistance     Cognition  Overall Cognitive Status: WFL                 LUE AROM (degrees)  LUE General AROM: limited at 90 degrees elevation due to hx of shoulder injury/arthritis  Left Hand AROM (degrees)  Left Hand AROM: WFL  RUE AROM (degrees)  RUE AROM : WFL  Right Hand AROM (degrees)  Right Hand AROM: WFL  LUE Strength  Gross LUE Strength: WFL  L Hand General: 4/5  LUE Strength Comment: MMS tested via MMT LUE 4/5  RUE Strength  Gross RUE Strength: WFL  R Hand General: 4/5  RUE Strength Comment: MMS tested via MMT RUE 4/5                   Plan   Plan  Times per week: 7  Times per day: Daily  Plan weeks: length of hospitalization  Current Treatment Recommendations: Strengthening,ROM,Functional Mobility Training,Endurance Training,Safety Education & Training,Patient/Caregiver Education & Training,Self-Care / ADL      AM-PAC Score        AM-PAC Inpatient Daily Activity Raw Score: 19 (01/17/22 0848)  AM-PAC Inpatient ADL T-Scale Score : 40.22 (01/17/22 0848)  ADL Inpatient CMS 0-100% Score: 42.8 (01/17/22 0848)  ADL Inpatient CMS G-Code Modifier : CK (01/17/22 0848)    Goals  Short term goals  Time Frame for Short term goals: 20 visits  Short term goal 1: Pt will demo BUE HEP to increase MMS to 4+/5 for increased ability to complete ADLs. Short term goal 2: Pt will tolerate ther ex/ther act x10 mins with <2 RBs to increase endurance for ADLs. Short term goal 3: Pt will complete grooming/dressing in seated or standing with s/u and distant sup for increased safety and Ind with ADLs. Short term goal 4: Pt will be educated on d/c folder to increae safety upon returning home.   Patient Goals   Patient goals : to get better       Therapy Time   Individual Concurrent Group Co-treatment   Time In 0820         Time Out 0835         Minutes [address], OTR/L

## 2022-01-18 VITALS
HEIGHT: 64 IN | OXYGEN SATURATION: 99 % | WEIGHT: 146 LBS | DIASTOLIC BLOOD PRESSURE: 77 MMHG | HEART RATE: 69 BPM | SYSTOLIC BLOOD PRESSURE: 150 MMHG | TEMPERATURE: 98.1 F | RESPIRATION RATE: 16 BRPM | BODY MASS INDEX: 24.92 KG/M2

## 2022-01-18 LAB
ABSOLUTE EOS #: <0.03 K/UL (ref 0–0.44)
ABSOLUTE IMMATURE GRANULOCYTE: 0.03 K/UL (ref 0–0.3)
ABSOLUTE LYMPH #: 1.54 K/UL (ref 1.1–3.7)
ABSOLUTE MONO #: 0.66 K/UL (ref 0.1–1.2)
ALBUMIN SERPL-MCNC: 3.8 G/DL (ref 3.5–5.2)
ALBUMIN/GLOBULIN RATIO: 1.3 (ref 1–2.5)
ALP BLD-CCNC: 68 U/L (ref 35–104)
ALT SERPL-CCNC: 12 U/L (ref 5–33)
ANION GAP SERPL CALCULATED.3IONS-SCNC: 14 MMOL/L (ref 9–17)
AST SERPL-CCNC: 13 U/L
BASOPHILS # BLD: 0 % (ref 0–2)
BASOPHILS ABSOLUTE: <0.03 K/UL (ref 0–0.2)
BILIRUB SERPL-MCNC: 0.34 MG/DL (ref 0.3–1.2)
BUN BLDV-MCNC: 12 MG/DL (ref 8–23)
BUN/CREAT BLD: 19 (ref 9–20)
C-REACTIVE PROTEIN: 34.7 MG/L (ref 0–5)
CALCIUM SERPL-MCNC: 9.7 MG/DL (ref 8.6–10.4)
CHLORIDE BLD-SCNC: 102 MMOL/L (ref 98–107)
CO2: 26 MMOL/L (ref 20–31)
CREAT SERPL-MCNC: 0.63 MG/DL (ref 0.5–0.9)
D-DIMER QUANTITATIVE: 0.69 MG/L FEU (ref 0–0.59)
DIFFERENTIAL TYPE: ABNORMAL
EOSINOPHILS RELATIVE PERCENT: 0 % (ref 1–4)
GFR AFRICAN AMERICAN: >60 ML/MIN
GFR NON-AFRICAN AMERICAN: >60 ML/MIN
GFR SERPL CREATININE-BSD FRML MDRD: NORMAL ML/MIN/{1.73_M2}
GFR SERPL CREATININE-BSD FRML MDRD: NORMAL ML/MIN/{1.73_M2}
GLUCOSE BLD-MCNC: 90 MG/DL (ref 70–99)
HCT VFR BLD CALC: 39.6 % (ref 36.3–47.1)
HEMOGLOBIN: 12.7 G/DL (ref 11.9–15.1)
IMMATURE GRANULOCYTES: 0 %
LYMPHOCYTES # BLD: 19 % (ref 24–43)
MCH RBC QN AUTO: 29 PG (ref 25.2–33.5)
MCHC RBC AUTO-ENTMCNC: 32.1 G/DL (ref 28.4–34.8)
MCV RBC AUTO: 90.4 FL (ref 82.6–102.9)
MONOCYTES # BLD: 8 % (ref 3–12)
NRBC AUTOMATED: 0 PER 100 WBC
PDW BLD-RTO: 16.9 % (ref 11.8–14.4)
PLATELET # BLD: 255 K/UL (ref 138–453)
PLATELET ESTIMATE: ABNORMAL
PMV BLD AUTO: 9.7 FL (ref 8.1–13.5)
POTASSIUM SERPL-SCNC: 3.9 MMOL/L (ref 3.7–5.3)
RBC # BLD: 4.38 M/UL (ref 3.95–5.11)
RBC # BLD: ABNORMAL 10*6/UL
SEG NEUTROPHILS: 73 % (ref 36–65)
SEGMENTED NEUTROPHILS ABSOLUTE COUNT: 5.77 K/UL (ref 1.5–8.1)
SODIUM BLD-SCNC: 142 MMOL/L (ref 135–144)
TOTAL PROTEIN: 6.7 G/DL (ref 6.4–8.3)
WBC # BLD: 8 K/UL (ref 3.5–11.3)
WBC # BLD: ABNORMAL 10*3/UL

## 2022-01-18 PROCEDURE — 94761 N-INVAS EAR/PLS OXIMETRY MLT: CPT

## 2022-01-18 PROCEDURE — 97116 GAIT TRAINING THERAPY: CPT

## 2022-01-18 PROCEDURE — 80053 COMPREHEN METABOLIC PANEL: CPT

## 2022-01-18 PROCEDURE — 6370000000 HC RX 637 (ALT 250 FOR IP): Performed by: INTERNAL MEDICINE

## 2022-01-18 PROCEDURE — 85025 COMPLETE CBC W/AUTO DIFF WBC: CPT

## 2022-01-18 PROCEDURE — 97110 THERAPEUTIC EXERCISES: CPT

## 2022-01-18 PROCEDURE — 94640 AIRWAY INHALATION TREATMENT: CPT

## 2022-01-18 PROCEDURE — 86140 C-REACTIVE PROTEIN: CPT

## 2022-01-18 PROCEDURE — 85379 FIBRIN DEGRADATION QUANT: CPT

## 2022-01-18 PROCEDURE — 2580000003 HC RX 258: Performed by: INTERNAL MEDICINE

## 2022-01-18 PROCEDURE — 6360000002 HC RX W HCPCS: Performed by: NURSE PRACTITIONER

## 2022-01-18 PROCEDURE — 36415 COLL VENOUS BLD VENIPUNCTURE: CPT

## 2022-01-18 PROCEDURE — 6360000002 HC RX W HCPCS: Performed by: INTERNAL MEDICINE

## 2022-01-18 RX ORDER — PREDNISONE 10 MG/1
TABLET ORAL
Qty: 41 TABLET | Refills: 0 | Status: SHIPPED | OUTPATIENT
Start: 2022-01-18 | End: 2022-02-01

## 2022-01-18 RX ORDER — ALBUTEROL SULFATE 2.5 MG/3ML
2.5 SOLUTION RESPIRATORY (INHALATION) 4 TIMES DAILY
Status: DISCONTINUED | OUTPATIENT
Start: 2022-01-18 | End: 2022-01-18 | Stop reason: HOSPADM

## 2022-01-18 RX ORDER — ALBUTEROL SULFATE 90 UG/1
2 AEROSOL, METERED RESPIRATORY (INHALATION) EVERY 4 HOURS PRN
Status: DISCONTINUED | OUTPATIENT
Start: 2022-01-18 | End: 2022-01-18 | Stop reason: HOSPADM

## 2022-01-18 RX ORDER — AMOXICILLIN AND CLAVULANATE POTASSIUM 875; 125 MG/1; MG/1
1 TABLET, FILM COATED ORAL 2 TIMES DAILY
Qty: 20 TABLET | Refills: 0 | Status: SHIPPED | OUTPATIENT
Start: 2022-01-18 | End: 2022-01-28

## 2022-01-18 RX ADMIN — ALBUTEROL SULFATE 2.5 MG: 2.5 SOLUTION RESPIRATORY (INHALATION) at 05:09

## 2022-01-18 RX ADMIN — FUROSEMIDE 20 MG: 20 TABLET ORAL at 08:33

## 2022-01-18 RX ADMIN — DULOXETINE 60 MG: 60 CAPSULE, DELAYED RELEASE ORAL at 08:33

## 2022-01-18 RX ADMIN — SODIUM CHLORIDE, PRESERVATIVE FREE 10 ML: 5 INJECTION INTRAVENOUS at 08:34

## 2022-01-18 RX ADMIN — RIVAROXABAN 20 MG: 20 TABLET, FILM COATED ORAL at 08:33

## 2022-01-18 RX ADMIN — FAMOTIDINE 20 MG: 20 TABLET, FILM COATED ORAL at 08:33

## 2022-01-18 RX ADMIN — ASPIRIN 81 MG: 81 TABLET, COATED ORAL at 08:33

## 2022-01-18 RX ADMIN — HYDROCODONE BITARTRATE AND ACETAMINOPHEN 1 TABLET: 10; 325 TABLET ORAL at 06:40

## 2022-01-18 RX ADMIN — POTASSIUM CHLORIDE 20 MEQ: 750 TABLET, EXTENDED RELEASE ORAL at 08:33

## 2022-01-18 RX ADMIN — HYDROXYCHLOROQUINE SULFATE 200 MG: 200 TABLET ORAL at 08:33

## 2022-01-18 RX ADMIN — PANTOPRAZOLE SODIUM 40 MG: 40 TABLET, DELAYED RELEASE ORAL at 06:40

## 2022-01-18 RX ADMIN — LEVOTHYROXINE SODIUM 150 MCG: 150 TABLET ORAL at 06:40

## 2022-01-18 RX ADMIN — METOPROLOL 25 MG: 25 TABLET ORAL at 08:33

## 2022-01-18 RX ADMIN — Medication 2000 UNITS: at 08:33

## 2022-01-18 RX ADMIN — DEXAMETHASONE 6 MG: 4 TABLET ORAL at 08:33

## 2022-01-18 ASSESSMENT — PAIN DESCRIPTION - PAIN TYPE
TYPE: CHRONIC PAIN
TYPE: CHRONIC PAIN

## 2022-01-18 ASSESSMENT — PAIN SCALES - GENERAL
PAINLEVEL_OUTOF10: 6
PAINLEVEL_OUTOF10: 4
PAINLEVEL_OUTOF10: 6
PAINLEVEL_OUTOF10: 6

## 2022-01-18 ASSESSMENT — PAIN DESCRIPTION - DESCRIPTORS
DESCRIPTORS: ACHING
DESCRIPTORS: ACHING

## 2022-01-18 ASSESSMENT — PAIN DESCRIPTION - ORIENTATION
ORIENTATION: LOWER
ORIENTATION: LOWER

## 2022-01-18 ASSESSMENT — PAIN DESCRIPTION - LOCATION
LOCATION: BACK
LOCATION: BACK

## 2022-01-18 ASSESSMENT — PAIN DESCRIPTION - ONSET: ONSET: ON-GOING

## 2022-01-18 ASSESSMENT — PAIN DESCRIPTION - FREQUENCY
FREQUENCY: CONTINUOUS
FREQUENCY: CONTINUOUS

## 2022-01-18 NOTE — PROGRESS NOTES
Physical Therapy  Facility/Department: [facility] AT THE [facility] MED SURG  Daily Treatment Note  NAME: Mauricio Belle  : 1945  MRN: [id number]    Date of Service: 2022    Discharge Recommendations:  Continue to assess pending progress        Assessment   Treatment Diagnosis: general weakness  Prognosis: Good  Activity Tolerance  Activity Tolerance: Patient Tolerated treatment well     Patient Diagnosis(es): The encounter diagnosis was COPD exacerbation ([facility] [address] has a past medical history of Chronic pain syndrome, COPD (chronic obstructive pulmonary disease) ([facility] [address] Depression, GERD (gastroesophageal reflux disease), Hypothyroidism, and Osteoporosis. has a past surgical history that includes Hysterectomy; back surgery; Breast surgery; Carpal tunnel release; joint replacement; joint replacement; fracture surgery (Left, 2018); and Wrist fracture surgery (Left, 2018). Restrictions  Restrictions/Precautions  Restrictions/Precautions: General Precautions,Fall Risk  Subjective   General  Chart Reviewed: Yes  Family / Caregiver Present: No  Referring Practitioner: Dr. Richard Felton: Pt reports no pain and states she is going home this afternoon. Orientation  Orientation  Overall Orientation Status: Within Normal Limits  Cognition      Objective      Transfers  Sit to Stand: Contact guard assistance;Stand by assistance  Stand to sit: Contact guard assistance;Stand by assistance  Ambulation  Ambulation?: Yes  Ambulation 1  Surface: level tile  Device: No Device  Assistance: Contact guard assistance  Gait Deviations: Slow Helen;Decreased step length;Decreased step height  Distance: 50 feet  Stairs/Curb  Stairs?: No     Balance  Posture: Fair  Sitting - Static: Good  Sitting - Dynamic: Good  Standing - Static: Fair  Standing - Dynamic: Fair  Exercises  Hip Flexion: x15  Hip Abduction: x15  Knee Long Arc Quad: x15  Ankle Pumps: x15  Comments: Above exercises completed in seated.  Standing B LE ther ex heel raises, hip flexion and hip abduction x 10.      Goals  Short term goals  Time Frame for Short term goals: 20 days  Short term goal 1: Pt will be educated on her POC  Short term goal 2: Pt will perform all transfers SBA in order to increase independence  Short term goal 3: Pt will ambulate >/=100 feet with LRAD SBA in order to return to PLOF  Short term goal 4: Pt will increase dynamic standing balance to Fair + in order to reduce fall risk  Patient Goals   Patient goals : \"to feel better and go home\"    Plan    Plan  Times per week: 7x/wk  Times per day: Twice a day  Specific instructions for Next Treatment: 2x/daily except weekends 1x/daily  Current Treatment Recommendations: Strengthening,Neuromuscular Re-education,Home Exercise Program,Manual Therapy - Soft Tissue Mobilization,Safety Education & Training,Balance Training,Endurance Training,Patient/Caregiver Education & Training,Functional Mobility Training,Transfer Training,Gait Training,Stair training  Safety Devices  Type of devices: Left in chair,Call light within reach     Therapy Time   Individual Concurrent Group Co-treatment   Time In 0853         Time Out 0916         Minutes [address] [place], [place]

## 2022-01-18 NOTE — PROGRESS NOTES
Discharge instructions reviewed questions answered iv removed.  Patient taken to private vehicle via wheelchair

## 2022-01-18 NOTE — PROGRESS NOTES
Occupational Therapy  Facility/Department: [facility] AT THE [facility] MED SURG  Daily Treatment Note  NAME: Renato Ordoñez  : 1945  MRN: [id number]    Date of Service: 2022    Discharge Recommendations:  Continue to assess pending progress,Home with assist PRN       Assessment      OT Education: OT Role;Plan of Care  Patient Education: Pt was asked if she had any questions about going home, pt denied but did state she does arm and leg exercises at home  Activity Tolerance  Activity Tolerance: Patient Tolerated treatment well  Safety Devices  Safety Devices in place: Yes  Type of devices: Left in chair;Call light within reach;Nurse notified         Patient Diagnosis(es): The encounter diagnosis was COPD exacerbation ([facility] [address] has a past medical history of Chronic pain syndrome, COPD (chronic obstructive pulmonary disease) ([facility] [address] Depression, GERD (gastroesophageal reflux disease), Hypothyroidism, and Osteoporosis. has a past surgical history that includes Hysterectomy; back surgery; Breast surgery; Carpal tunnel release; joint replacement; joint replacement; fracture surgery (Left, 2018); and Wrist fracture surgery (Left, 2018). Restrictions  Restrictions/Precautions  Restrictions/Precautions: General Precautions,Fall Risk  Subjective   General  Referring Practitioner: Dr. Dane Wright  Diagnosis: COPD  Subjective  Subjective: Pt reports she has chronic back pain but  does not rate it this time. Pt statees she has had multiple surgeries in her lower back  General Comment  Comments: Pt was eager to be D/c today. Pt denies any questions about going home.  Pt was in bedside chair and agreeable to OT therex      Orientation     Objective                                                                Type of ROM/Therapeutic Exercise  Comment: Pt completed 1 # free weight with R arm in 5 planes, and L 1# free weight in 2 planes due to \"old injury\" in L shoulder x 15 reps and green digiflex x 20 reps x 2 sets to increase endurance and strength. Plan   Plan  Times per week: 7  Times per day: Daily  Plan weeks: length of hospitalization  Current Treatment Recommendations: Strengthening,ROM,Functional Mobility Training,Endurance Training,Safety Education & Training,Patient/Caregiver Education & Training,Self-Care / ADL  G-Code     OutComes Score                                                  AM-PAC Score             Goals  Short term goals  Time Frame for Short term goals: 20 visits  Short term goal 1: Pt will demo BUE HEP to increase MMS to 4+/5 for increased ability to complete ADLs. Short term goal 2: Pt will tolerate ther ex/ther act x10 mins with <2 RBs to increase endurance for ADLs. Short term goal 3: Pt will complete grooming/dressing in seated or standing with s/u and distant sup for increased safety and Ind with ADLs. Short term goal 4: Pt will be educated on d/c folder to increae safety upon returning home.   Patient Goals   Patient goals : to get better       Therapy Time   Individual Concurrent Group Co-treatment   Time In 1025         Time Out 1041         Minutes [address] [place]

## 2022-01-18 NOTE — PROGRESS NOTES
Patient lying in bed resting at this time. Vital signs and assessment completed. Patient states she is having 7/10 pain in her back. Writer to give Prema Knight with nightly medications as patient cannot have it until 2014. Patient just recently used the bathroom with the aide and does not need to go at this time. Lungs are diminished. Patient is comfortable in bed and denies any needs at this time. Call light, bedside table and personal belongings within reach.

## 2022-01-18 NOTE — PROGRESS NOTES
Progress Note    SUBJECTIVE:    Patient seen for f/u of Community acquired pneumonia. She sitting up in chair alert and oriented no distress. No hypoxia. Tolerating diet well. ROS:   Constitutional: negative  for fevers, and negative for chills. Respiratory: negative for shortness of breath, positive for cough, and negative for wheezing  Cardiovascular: negative for chest pain, and negative for palpitations  Gastrointestinal: negative for abdominal pain, negative for nausea,negative for vomiting, negative for diarrhea, and negative for constipation     All other systems were reviewed with the patient and are negative unless otherwise stated in HPI      OBJECTIVE:      Vitals:   Vitals:    01/18/22 0635   BP: (!) 150/77   Pulse: 69   Resp: 16   Temp: 98.1 °F (36.7 °C)   SpO2: 99%     Weight: 146 lb (66.2 kg)   Height: 5' 4\" (162.6 cm)     Weight  Wt Readings from Last 3 Encounters:   01/18/22 146 lb (66.2 kg)   10/16/21 151 lb 4.8 oz (68.6 kg)   05/21/21 151 lb 10.8 oz (68.8 kg)     Body mass index is 25.06 kg/m². 24HR INTAKE/OUTPUT:      Intake/Output Summary (Last 24 hours) at 1/18/2022 0801  Last data filed at 1/18/2022 1988  Gross per 24 hour   Intake --   Output 800 ml   Net -800 ml     -----------------------------------------------------------------  Exam:    GEN:    Awake, alert and oriented x3. EYES:  EOMI, pupils equal   NECK: Supple. No lymphadenopathy. No carotid bruit  CVS:    regular rate and rhythm, no audible murmur  PULM:  CTA, no wheezes, rales or rhonchi, no acute respiratory distress  ABD:    Bowels sounds normal.  Abdomen is soft. No distention. no tenderness to palpation. EXT:   no edema bilaterally . No calf tenderness. NEURO: Moves all extremities. Motor and sensory are grossly intact  SKIN:  No rashes.   No skin lesions.    -----------------------------------------------------------------    Diagnostic Data:      Complete Blood Count:   Recent Labs     01/15/22  4230 01/17/22  0535 01/18/22  0525   WBC 9.5 10.2 8.0   RBC 4.75 4.28 4.38   HGB 13.7 12.3 12.7   HCT 43.0 39.1 39.6   MCV 90.5 91.4 90.4   MCH 28.8 28.7 29.0   MCHC 31.9 31.5 32.1   RDW 17.4* 17.3* 16.9*    219 255   MPV 9.8 9.8 9.7        Last 3 Blood Glucose:   Recent Labs     01/15/22  1230 01/17/22  0535 01/18/22  0525   GLUCOSE 99 90 90        Comprehensive Metabolic Profile:   Recent Labs     01/15/22  1230 01/17/22  0535 01/18/22  0525    138 142   K 3.8 4.0 3.9    100 102   CO2 28 26 26   BUN 15 13 12   CREATININE 0.63 0.58 0.63   GLUCOSE 99 90 90   CALCIUM 9.8 9.3 9.7   PROT 6.9 6.5 6.7   LABALBU 4.1 3.5 3.8   BILITOT 0.38 0.28* 0.34   ALKPHOS 66 52 68   AST 18 15 13   ALT 13 12 12        Urinalysis:   Lab Results   Component Value Date    NITRU NEGATIVE 10/14/2021    COLORU Yellow 10/14/2021    PHUR 6.0 10/14/2021    WBCUA None 10/14/2021    RBCUA None 10/14/2021    MUCUS NOT REPORTED 10/14/2021    TRICHOMONAS NOT REPORTED 10/14/2021    YEAST NOT REPORTED 10/14/2021    BACTERIA NOT REPORTED 10/14/2021    SPECGRAV 1.020 10/14/2021    LEUKOCYTESUR NEGATIVE 10/14/2021    UROBILINOGEN Normal 10/14/2021    BILIRUBINUR NEGATIVE 10/14/2021    GLUCOSEU NEGATIVE 10/14/2021    KETUA NEGATIVE 10/14/2021    AMORPHOUS NOT REPORTED 10/14/2021       HgBA1c:  No results found for: LABA1C    Lactic Acid:   Lab Results   Component Value Date    LACTA 1.1 05/20/2021    LACTA 0.7 12/26/2020    LACTA 5.0 10/22/2020        Troponin: No results for input(s): TROPONINI in the last 72 hours.     CRP:    Recent Labs     01/16/22  1217 01/17/22  0535   .8* 87.0*         Radiology/Imaging:  CT CHEST PULMONARY EMBOLISM W CONTRAST   Final Result   Patchy bilateral interstitial, airspace and ground-glass infiltrates most   notably in the lung bases compatible with bilateral pneumonia      Coronary artery/atherosclerotic disease      No obvious evidence of pulmonary embolism, however the segmental and   subsegmental branches are not well opacified. Multiple nonspecific lymph nodes seen in the middle mediastinum, subcarinal   region and guillermo, possibly reactive and relatively stable from prior exam         XR CHEST PORTABLE   Final Result   Bilateral interstitial groundglass opacities likely represent viral   pneumonia, COVID-19 is not excluded.                ASSESSMENT / PLAN:  Community acquired pneumonia  · Continue current therapy   · Covid-Neg x 2  · Viral PCR-neg  · Dexamethasone 6 mg daily x10 days  · Continue IV Rocephin and Zithromax until viral panel returns  · CT suggests viral pneumonia  · Hypoxia  · Resolved  · COPD  · Nebs  · Chronic pain syndrome  · Currently on Dilaudid infusion pump with as needed Norco  · Nutrition status:   · Well developed, well nourished with no malnutrition  · Dietician consult initiated  · Hospital Prophylaxis:   · DVT: Lovenox   · Stress Ulcer: H2 Blocker   · High risk medications: none   · Disposition:    · Discharge plan is home  [facility]      Ty Schilder, APRN - CNP , YRAN, NP-C  Hospitalist Medicine        1/18/2022, 8:01 AM

## 2022-01-18 NOTE — DISCHARGE SUMMARY
Discharge Summary    Chucky Gupta  :  1945  MRN:  [id number]    Admit date:  1/15/2022      Discharge date: 2022     Admitting Physician:  Margarette Neil MD    Discharge Diagnoses:    Principal Problem:    Community acquired pneumonia  Active Problems:    Hypothyroidism    Chronic midline low back pain without sciatica  Resolved Problems:    * No resolved hospital problems. [facility] Course: Chucky Gupta is a 77 y.o. female admitted with immunity acquired pneumonia. She presented to the emergency room with complaints of shortness of breath and cough. During her evaluation patient was found to be hypoxic with SPO2 of 86% on room air. Symptom onset was 2 days. Patient has known history of COPD with several emergency room visits in the past.  She is vaccinated against COVID-19 and was negative for COVID as well. Viral panel was negative. Patient had no leukocytosis. Patient was tachycardic upon arrival and febrile at 100.5. Patient has history of pneumonia in the past.  X-rays were concerning for viral pneumonia. Patient was admitted placed on IV Rocephin as well as Zithromax. Patient's symptoms improved. She was weaned off oxygen and tolerating steroids and nebulizers well. Patient will be discharged home today she will continue with steroid taper as well as Augmentin for 10 days. She is agreeable to this plan of care. Consultants:  none    Procedures: none    Complications: none    Discharge Condition: fair    Exam:  GEN:    Awake, alert and oriented x3. EYES:   EOMI, pupils equal   NECK: Supple. No lymphadenopathy. No carotid bruit  CVS:     regular rate and rhythm, no audible murmur  PULM:  CTA, no wheezes, rales or rhonchi, no acute respiratory distress  ABD:     Bowels sounds normal.  Abdomen is soft. No distention. no tenderness to palpation. EXT:     no edema bilaterally . No calf tenderness. NEURO: Moves all extremities.   Motor and sensory are grossly intact  SKIN:    No rashes. No skin lesions. Significant Diagnostic Studies:   Lab Results   Component Value Date    WBC 8.0 01/18/2022    HGB 12.7 01/18/2022     01/18/2022       Lab Results   Component Value Date    BUN 12 01/18/2022    CREATININE 0.63 01/18/2022     01/18/2022    K 3.9 01/18/2022    CALCIUM 9.7 01/18/2022     01/18/2022    CO2 26 01/18/2022    LABGLOM >60 01/18/2022       Lab Results   Component Value Date    WBCUA None 10/14/2021    RBCUA None 10/14/2021    EPITHUA 0 TO 2 10/14/2021    LEUKOCYTESUR NEGATIVE 10/14/2021    SPECGRAV 1.020 10/14/2021    GLUCOSEU NEGATIVE 10/14/2021    KETUA NEGATIVE 10/14/2021    PROTEINU NEGATIVE 10/14/2021    HGBUR NEGATIVE 10/14/2021    CASTUA NOT REPORTED 10/14/2021    CRYSTUA NOT REPORTED 10/14/2021    BACTERIA NOT REPORTED 10/14/2021    YEAST NOT REPORTED 10/14/2021       XR CHEST PORTABLE    Result Date: 1/15/2022  EXAMINATION: ONE XRAY VIEW OF THE CHEST 1/15/2022 12:45 pm COMPARISON: Chest x-ray dated 06/01/2021 HISTORY: ORDERING SYSTEM PROVIDED HISTORY: dyspnea TECHNOLOGIST PROVIDED HISTORY: dyspnea FINDINGS: Bilateral interstitial groundglass opacities likely represent viral pneumonia, COVID-19 is not excluded. No pleural effusion or pneumothorax. Cardiac silhouette is unremarkable. Degenerative disease of the visualized osseous structures. Bilateral interstitial groundglass opacities likely represent viral pneumonia, COVID-19 is not excluded. CT CHEST PULMONARY EMBOLISM W CONTRAST    Result Date: 1/15/2022  EXAMINATION: CTA OF THE CHEST 1/15/2022 11:09 am TECHNIQUE: CTA of the chest was performed after the administration of intravenous contrast.  Multiplanar reformatted images are provided for review. MIP images are provided for review. Dose modulation, iterative reconstruction, and/or weight based adjustment of the mA/kV was utilized to reduce the radiation dose to as low as reasonably achievable.  COMPARISON: 05/20/2021 HISTORY: ORDERING SYSTEM PROVIDED HISTORY: hypoxia, cough TECHNOLOGIST PROVIDED HISTORY: hypoxia, cough Decision Support Exception - unselect if not a suspected or confirmed emergency medical condition->Emergency Medical Condition (MA) FINDINGS: Pulmonary Arteries: Pulmonary arteries are less than adequately opacified for evaluation. No obvious evidence of intraluminal filling defect to suggest pulmonary embolism. Main pulmonary artery is normal in caliber. Mediastinum: There are multiple nonspecific lymph nodes seen in the middle mediastinum, subcarinal region and guillermo, possibly reactive Lungs/pleura: Patchy bilateral interstitial, airspace and ground-glass infiltrates most notably in the lung bases. . There is underlying biapical and bibasilar fibrosis. No suspicious pulmonary masses are noted, however the previously described nodule in the right lower lobe is obscured by the infiltrates. .  No pleural effusions are identified. Cardiomediastinal Structures: Coronary arterial calcifications are seen. Intimal calcifications associated with the thoracic aorta. No evidence of thoracic aortic aneurysm or dissection . Cardiac chambers are normal Upper Abdomen: Large hiatal hernia Soft Tissues/Bones: There are hypertrophic degenerative changes in the thoracic spine. No acute osseous abnormalities. Stable probable sebaceous cyst along the subcutaneous tissues of the right anterior chest wall. Status post left shoulder prosthesis. Cervical fusion plate. Patchy bilateral interstitial, airspace and ground-glass infiltrates most notably in the lung bases compatible with bilateral pneumonia Coronary artery/atherosclerotic disease No obvious evidence of pulmonary embolism, however the segmental and subsegmental branches are not well opacified.  Multiple nonspecific lymph nodes seen in the middle mediastinum, subcarinal region and guillermo, possibly reactive and relatively stable from prior exam       Assessment and Plan:  Patient Active Problem List    Diagnosis Date Noted    Community acquired pneumonia 01/16/2022    Anemia 10/14/2021    Biventricular congestive heart failure ([facility] [address]     Hypothyroidism 10/05/2018    Major depressive disorder 10/05/2018    Chronic midline low back pain without sciatica 10/05/2018    Iron deficiency anemia 10/05/2018        Discharge Medications:         Medication List      START taking these medications    amoxicillin-clavulanate 875-125 MG per tablet  Commonly known as: AUGMENTIN  Take 1 tablet by mouth 2 times daily for 10 days     predniSONE 10 MG tablet  Commonly known as: DELTASONE  Take 3 tablets by mouth 2 times daily for 3 days, THEN 2 tablets 2 times daily for 3 days, THEN 1 tablet 2 times daily for 3 days, THEN 1 tablet daily for 5 days.   Start taking on: January 18, 2022        CONTINUE taking these medications    albuterol sulfate  (90 Base) MCG/ACT inhaler  Inhale 2 puffs into the lungs 4 times daily     ascorbic acid 1000 MG tablet  Commonly known as: VITAMIN C  Take 1 tablet by mouth 2 times daily for 7 days     aspirin 81 MG tablet     calcium citrate-vitamin D 315-250 MG-UNIT Tabs per tablet  Commonly known as: CITRICAL + D     dextrose 5 % SOLN with HYDROmorphone HCl PF 10 MG/ML SOLN 1 mg/mL     DULoxetine 60 MG extended release capsule  Commonly known as: CYMBALTA     Fosamax 70 MG tablet  Generic drug: alendronate     furosemide 20 MG tablet  Commonly known as: Lasix  Take 1 tablet by mouth daily     HYDROcodone-acetaminophen  MG per tablet  Commonly known as: NORCO     levothyroxine 150 MCG tablet  Commonly known as: SYNTHROID     metoprolol tartrate 25 MG tablet  Commonly known as: LOPRESSOR  Take 1 tablet by mouth 2 times daily     pantoprazole sodium 40 MG Pack packet  Commonly known as: PROTONIX     PLAQUENIL PO     potassium chloride 20 MEQ packet  Commonly known as: KLOR-CON     pregabalin 300 MG capsule  Commonly known as: LYRICA  Take 1 capsule by mouth 2 times daily for 30 days. rivaroxaban 20 MG Tabs tablet  Commonly known as: Xarelto  Take 1 tablet by mouth daily (with breakfast)     traZODone 100 MG tablet  Commonly known as: DESYREL     vitamin D 50 MCG (2000 UT) Tabs tablet  Commonly known as: CHOLECALCIFEROL  Take 1 tablet by mouth daily for 7 days           Where to Get Your Medications      These medications were sent to [facility] [place], [address] [place] [address]  [address], [facility] [address]    Phone: [phone]   · amoxicillin-clavulanate 875-125 MG per tablet  · predniSONE 10 MG tablet         Patient Instructions:    Activity: activity as tolerated  Diet: cardiac diet  Wound Care: none needed  Other: None    Disposition:   Discharge to Home    Follow up:  Patient will be followed by Tiffany Roblero MD in 1-2 weeks    CORE MEASURES on Discharge (if applicable)  ACE/ARB in CHF: NA  Statin in MI: NA  ASA in MI: NA  Statin in CVA: NA  Antiplatelet in CVA: NA    Total time spent on discharge services: 40 minutes    Including the following activities:  Evaluation and Management of patient  Discussion with patient and/or surrogate about current care plan  Coordination with Case Management and/or   Coordination of care with Consultants (if applicable)   Coordination of care with Receiving Facility Physician (if applicable)  Completion of DME forms (if applicable)  Preparation of Discharge Summary  Preparation of Medication Reconciliation  Preparation of Discharge Prescriptions    Signed:  RYAN Mane - CNP, RYAN, NP-C  1/18/2022, 9:18 AM

## 2022-01-18 NOTE — DISCHARGE INSTR - DIET

## 2022-02-01 ENCOUNTER — HOSPITAL ENCOUNTER (OUTPATIENT)
Dept: NON INVASIVE DIAGNOSTICS | Age: 77
Discharge: HOME | End: 2022-02-01
Payer: MEDICARE

## 2022-02-01 ENCOUNTER — OFFICE VISIT (OUTPATIENT)
Dept: CARDIOLOGY | Age: 77
End: 2022-02-01
Payer: MEDICARE

## 2022-02-01 VITALS
RESPIRATION RATE: 18 BRPM | BODY MASS INDEX: 26.46 KG/M2 | HEIGHT: 64 IN | HEART RATE: 77 BPM | OXYGEN SATURATION: 95 % | SYSTOLIC BLOOD PRESSURE: 131 MMHG | WEIGHT: 155 LBS | DIASTOLIC BLOOD PRESSURE: 70 MMHG

## 2022-02-01 DIAGNOSIS — R06.02 SOB (SHORTNESS OF BREATH): ICD-10-CM

## 2022-02-01 DIAGNOSIS — J44.9 STAGE 3 SEVERE COPD BY GOLD CLASSIFICATION (HCC): ICD-10-CM

## 2022-02-01 DIAGNOSIS — I50.42 CHRONIC COMBINED SYSTOLIC AND DIASTOLIC CONGESTIVE HEART FAILURE (HCC): Primary | ICD-10-CM

## 2022-02-01 DIAGNOSIS — I50.42 CHRONIC COMBINED SYSTOLIC AND DIASTOLIC CONGESTIVE HEART FAILURE (HCC): ICD-10-CM

## 2022-02-01 DIAGNOSIS — Z86.79 HISTORY OF ATRIAL FIBRILLATION: ICD-10-CM

## 2022-02-01 DIAGNOSIS — I42.8 NICM (NONISCHEMIC CARDIOMYOPATHY) (HCC): ICD-10-CM

## 2022-02-01 DIAGNOSIS — J18.9 RECURRENT PNEUMONIA: ICD-10-CM

## 2022-02-01 PROBLEM — I48.91 NEW ONSET A-FIB (HCC): Status: ACTIVE | Noted: 2022-02-01

## 2022-02-01 LAB
LV EF: 53 %
LVEF MODALITY: NORMAL

## 2022-02-01 PROCEDURE — G8427 DOCREV CUR MEDS BY ELIG CLIN: HCPCS | Performed by: INTERNAL MEDICINE

## 2022-02-01 PROCEDURE — 3017F COLORECTAL CA SCREEN DOC REV: CPT | Performed by: INTERNAL MEDICINE

## 2022-02-01 PROCEDURE — 1123F ACP DISCUSS/DSCN MKR DOCD: CPT | Performed by: INTERNAL MEDICINE

## 2022-02-01 PROCEDURE — 1036F TOBACCO NON-USER: CPT | Performed by: INTERNAL MEDICINE

## 2022-02-01 PROCEDURE — G8484 FLU IMMUNIZE NO ADMIN: HCPCS | Performed by: INTERNAL MEDICINE

## 2022-02-01 PROCEDURE — 99214 OFFICE O/P EST MOD 30 MIN: CPT | Performed by: INTERNAL MEDICINE

## 2022-02-01 PROCEDURE — 1111F DSCHRG MED/CURRENT MED MERGE: CPT | Performed by: INTERNAL MEDICINE

## 2022-02-01 PROCEDURE — G8400 PT W/DXA NO RESULTS DOC: HCPCS | Performed by: INTERNAL MEDICINE

## 2022-02-01 PROCEDURE — 1090F PRES/ABSN URINE INCON ASSESS: CPT | Performed by: INTERNAL MEDICINE

## 2022-02-01 PROCEDURE — 93306 TTE W/DOPPLER COMPLETE: CPT

## 2022-02-01 PROCEDURE — 3023F SPIROM DOC REV: CPT | Performed by: INTERNAL MEDICINE

## 2022-02-01 PROCEDURE — 99211 OFF/OP EST MAY X REQ PHY/QHP: CPT | Performed by: INTERNAL MEDICINE

## 2022-02-01 PROCEDURE — 4040F PNEUMOC VAC/ADMIN/RCVD: CPT | Performed by: INTERNAL MEDICINE

## 2022-02-01 PROCEDURE — G8417 CALC BMI ABV UP PARAM F/U: HCPCS | Performed by: INTERNAL MEDICINE

## 2022-02-01 NOTE — PATIENT INSTRUCTIONS
SURVEY:    You may be receiving a survey from [employer] regarding your visit today. Please complete the survey to enable us to provide the highest quality of care to you and your family. If you cannot score us a very good on any question, please call the office to discuss how we could have made your experience a very good one. Thank you.

## 2022-02-01 NOTE — PROGRESS NOTES
Herman Serrato am scribing for and in the presence of Jane Hernandez MD, F.A.C.C..    Patient: Daniela Segura  : 1945  Date of Visit: 2022    History of Present Illness:        Shelli Shields MD    REASON FOR VISIT / CONSULTATION:   Chief Complaint   Patient presents with    Follow-up     Hx: CHf, hx of afib, SOb, COPD. pt is here for a follow up. doing well was IP 1/15 for SOB. some SOB. some dizziness. no falls or near falls Denies: CP, palps       I had the pleasure of seeing Daniela Segura in my office today. Ms. John Schmidt is a 77 y.o. female who presented for follow-up. She was admitted to [facility] from 10/22/2020 to 2020 because of severe sepsis/septic shock secondary to bilateral pneumonia. She was treated with Rocephin and azithromycin. Hospital course complicated by acute respiratory failure requiring intubation. Patient also found to have severe left ventricular systolic dysfunction with estimated ejection fraction of 30%. She underwent cardiac catheterization that showed no significant CAD. Patient treated for nonischemic cardiomyopathy and was discharged with LifeVest.  Repeat echo in 2021 showed ejection fraction 50%. LifeVest discontinued. Other medical problems include COPD, depression, gastroesophageal reflux disease and osteoporosis. During that same hospital stay, she developed atrial fibrillation with rapid ventricular response on 10/23/2020. Henry Delong was treated with Lopressor, amiodarone in addition to anticoagulation with Xarelto. She was also discharged with home oxygen therapy. Cardiac Catheterization done on 10/22/2020 showed mild CAD. Echocardiogram done on 2020 showed definity echo contrast was used to better assess left ventricular wall motion and thickness. Mild global hypokinesis with no segmental abnormalities. No evidence of apical thrombus noted on Definity contrast imaging.  Compared to the previous study of 6/24/2019, Ejection fraction has improved from 30 to about 50%    EKG done in office on 1/18/2021: Maintaining sinus rhythm. Ms. Joseph Garay is here for a follow up. She has had three hospital admissions in the last year. One of them back in October 2021 to [facility] and had a cardiac cath at that time that showed no significant CAD. The reported ejection fraction was 25-30%. Her  said she also had two admission at [address] [place] [address] for pneumonia over the last year as well. She does not drink enough fluids. She does have some dizziness. She denies having any chest pain, pressure or tightness. She denies having any palpitations. She denies having any abdominal pain, nausea or vomiting. No cough, fever or chills. No bleeding problems, bowel issues, problems with medications or any other concerns at this time. She said she has gained some weight, she has eaten a lot of cookies and ice cream. She does take care of her household chores, her  does most of the grocery shopping. She does not want to be out and around people because she get sick very easily. 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 She does not have oxygen at home anymore. She sleeps well at night. She does wake up once in a while at night. She does have a cold and has a cough that wakes her up. She does follow up with Pulmonology. She is clinically dry with no lower extremity edema and no worsening shortness of breath. No bleeding complications with the blood thinner. I reviewed her most recent blood work. Kidney function and serum potassium are normal.      PAST MEDICAL HISTORY:        Past Medical History:   Diagnosis Date    Chronic pain syndrome     dilaudid infusion pump    COPD (chronic obstructive pulmonary disease) (HCC)     Depression     GERD (gastroesophageal reflux disease)     Hypothyroidism     Osteoporosis    Nonischemic cardiomyopathy.   History of atrial fibrillation. Chronic anticoagulation. Acute on chronic hypoxemic/hypercarbic respiratory failure. CURRENT ALLERGIES: Patient has no known allergies. REVIEW OF SYSTEMS: 14 systems were reviewed. Pertinent positives and negatives as above, all else negative. Past Surgical History:   Procedure Laterality Date    BACK SURGERY      BREAST SURGERY      CARPAL TUNNEL RELEASE      FRACTURE SURGERY Left 2018    ORIF wrist    HYSTERECTOMY      JOINT REPLACEMENT      right knee    JOINT REPLACEMENT      WRIST FRACTURE SURGERY Left 2018    WRIST OPEN REDUCTION INTERNAL FIXATION-DISTAL RADIUS performed by Dank Hou MD at [address] History:  Social History     Tobacco Use    Smoking status: Former Smoker     Packs/day: 1.00     Years: 10.00     Pack years: 10.00     Quit date: 1976     Years since quittin.2    Smokeless tobacco: Never Used   Vaping Use    Vaping Use: Never used   Substance Use Topics    Alcohol use: No    Drug use: No        CURRENT MEDICATIONS:        Outpatient Medications Marked as Taking for the 22 encounter (Office Visit) with Hari Le MD   Medication Sig Dispense Refill    predniSONE (DELTASONE) 10 MG tablet Take 3 tablets by mouth 2 times daily for 3 days, THEN 2 tablets 2 times daily for 3 days, THEN 1 tablet 2 times daily for 3 days, THEN 1 tablet daily for 5 days. 41 tablet 0    dextrose 5 % SOLN with HYDROmorphone HCl PF 10 MG/ML SOLN 1 mg/mL Infuse intravenously continuous Pt has continuous dilaudid infusion pump implanted, but is unsure of dosage.  furosemide (LASIX) 20 MG tablet Take 1 tablet by mouth daily 90 tablet 3    rivaroxaban (XARELTO) 20 MG TABS tablet Take 1 tablet by mouth daily (with breakfast) 90 tablet 3    HYDROcodone-acetaminophen (NORCO)  MG per tablet Take 1 tablet by mouth every 6 hours as needed for Pain.       metoprolol tartrate (LOPRESSOR) 25 MG tablet Take 1 tablet by mouth 2 times daily 60 tablet 3    Hydroxychloroquine Sulfate (PLAQUENIL PO) Take 1 tablet by mouth daily       albuterol sulfate  (90 Base) MCG/ACT inhaler Inhale 2 puffs into the lungs 4 times daily 1 Inhaler 0    levothyroxine (SYNTHROID) 150 MCG tablet Take 150 mcg by mouth Daily      aspirin 81 MG tablet Take 81 mg by mouth daily      DULoxetine (CYMBALTA) 60 MG extended release capsule Take 60 mg by mouth daily      traZODone (DESYREL) 100 MG tablet Take 200 mg by mouth nightly       pantoprazole sodium (PROTONIX) 40 MG PACK packet Take 40 mg by mouth 2 times daily (before meals)      potassium chloride (KLOR-CON) 20 MEQ packet Take 20 mEq by mouth 2 times daily      calcium citrate-vitamin D (CITRICAL + D) 315-250 MG-UNIT TABS per tablet Take 1 tablet by mouth 2 times daily (with meals)      alendronate (FOSAMAX) 70 MG tablet Take 70 mg by mouth every 7 days         FAMILY HISTORY: family history includes Heart Attack in her sister; Heart Attack (age of onset: 61) in her father; Heart Disease in her brother. PHYSICAL EXAMINATION:     /70 (Site: Left Upper Arm, Position: Sitting, Cuff Size: Medium Adult)   Pulse 77   Resp 18   Ht 5' 4.02\" (1.626 m)   Wt 155 lb (70.3 kg)   SpO2 95%   BMI 26.59 kg/m²  Body mass index is 26.59 kg/m². Constitutional: She is oriented to person, place, and time. She appears well-developed and well-nourished. In no acute distress. HEENT: Normocephalic and atraumatic. No JVD present. Carotid bruit is not present. No mass and no thyromegaly present. No lymphadenopathy present. Cardiovascular: Normal rate, regular rhythm, normal heart sounds. Exam reveals no gallop and no friction rubs. 2/6 systolic murmur, 4th intercostal space on the LEFT must lateral to the sternal border. Pulmonary/Chest: Severe kyphosis with poor air entry bilaterally. No significant wheezes or crackles. Scar of previous neck surgery noted. Abdominal: Soft, non-tender.  Bowel sounds and aorta are normal. She exhibits no organomegaly, mass or bruit. Extremities: No significant edema. No cyanosis or clubbing. 2+ radial and carotid pulses. Distal extremity pulses: 2+ bilaterally. Neurological: She is alert and oriented to person, place, and time. No evidence of gross cranial nerve deficit. Coordination appeared normal.   Skin: Skin is warm and dry. There is no rash or diaphoresis. Psychiatric: She has a normal mood and affect. Her speech is normal and behavior is normal.      MOST RECENT LABS ON RECORD:   Lab Results   Component Value Date    WBC 8.0 01/18/2022    HGB 12.7 01/18/2022    HCT 39.6 01/18/2022     01/18/2022    TRIG 153 (H) 10/24/2020    ALT 12 01/18/2022    AST 13 01/18/2022     01/18/2022    K 3.9 01/18/2022     01/18/2022    CREATININE 0.63 01/18/2022    BUN 12 01/18/2022    CO2 26 01/18/2022    TSH 0.69 10/24/2020    INR 1.3 10/14/2021       ASSESSMENT:     Patient Active Problem List    Diagnosis Date Noted    Community acquired pneumonia 01/16/2022    Anemia 10/14/2021    Biventricular congestive heart failure ([address]     Hypothyroidism 10/05/2018    Major depressive disorder 10/05/2018    Chronic midline low back pain without sciatica 10/05/2018    Iron deficiency anemia 10/05/2018        Diagnosis Orders   1. Chronic combined systolic and diastolic congestive heart failure ([address]  Echo 2D w doppler w color complete   2. History of atrial fibrillation  Echo 2D w doppler w color complete   3. SOB (shortness of breath)  Echo 2D w doppler w color complete   4. NICM (nonischemic cardiomyopathy) ([address]     5. Stage 3 severe COPD by GOLD classification ([address]     6. Recurrent pneumonia       PLAN:         Chronic combined heart failure:    Beta Blocker: Continue Metoprolol tartrate (Lopressor) 25 mg bid. ACE Inibitor/ARB: Not indicated at this time. I get a repeat echo today and her ejection fraction is preserved at 50 to 55%.   Significantly improved compared to her echo back in October 2021 when she was admitted to [facility] with sepsis and pneumonia.  Diuretics: Continue furosemide (Lasix) 20 mg every morning.  Heart failure counseling: I told them to start wearing lower extremity compression stockings and I advised them to try and keep their legs up whenever possible and to limit salt in their diet.  Additional Testing List: I ordered a echocardiogram to better assess for the etiology of this problem and to help guide future management     History of Atrial Fibrillation: 10/23/2020 with RVR. Currently maintaining sinus rhythm. Beta Blocker: Continue Metoprolol tartrate (Lopressor) 25 mg bid. Stroke Risk: Her CHADS2-VASc score is greater than 2 (2.2% stroke risk)  Anticoagulation: Continue Riveroxiban (Xarelto) 20 mg daily. I also reminded her to watch for signs of blood in her stool or black tarry stools and stop the medication immediately if this develops as this could be life threatening. · Shortness of Breath/COPD:  · Chronic hypoxemic respiratory failure. · She does not use oxygen anymore  · Follow with Pulmonology     · Atherosclerotic Heart Disease: Mild nonobstructive CAD on prior cardiac cath. Antiplatelet Agent: Continue Aspirin 81 mg daily.   Beta Blocker: Continue Metoprolol tartrate (Lopressor) 25 mg bid.  She has no angina. No history of dyslipidemia. She has never been on a statin therapy.  I do not see a long-term benefit of using statins in her condition giving her nonobstructive CAD. Finally, I recommended that she continue her other medications and follow up with you as previously scheduled. FOLLOW UP:   I told Ms. South to call my office if she had any problems, but otherwise I asked her to Return in about 1 year (around 2/1/2023). However, I would be happy to see her sooner should the need arise. Sincerely,  Hanane Valenzuela MD, F.A.C.C.   [address] [facility] Cardiology Specialist    [place] [address] [place] [address]  Phone: [phone], Fax: [phone]     I believe that the risk of significant morbidity and mortality related to the patient's current medical conditions are: intermediate-high. Between 30 and 44 minutes were spent during prep work, discussion and exam of the patient, and follow up documentation and all of their questions were answered. The documentation recorded by the scribe, accurately and completely reflects the services I personally performed and the decisions made by me. Chitra Kee MD, F.A.C.C.  February 1, 2022

## 2022-02-02 ENCOUNTER — TELEPHONE (OUTPATIENT)
Dept: CARDIOLOGY | Age: 77
End: 2022-02-02

## 2022-02-02 NOTE — TELEPHONE ENCOUNTER
----- Message from Edgardo Zapata MD sent at 2/1/2022 11:18 PM EST -----  Heart function is good. Continue current therapy and follow-up. Please call with questions and special concerns.   Thank you

## 2022-02-05 ENCOUNTER — APPOINTMENT (OUTPATIENT)
Dept: CT IMAGING | Age: 77
DRG: 177 | End: 2022-02-05
Payer: MEDICARE

## 2022-02-05 ENCOUNTER — APPOINTMENT (OUTPATIENT)
Dept: GENERAL RADIOLOGY | Age: 77
DRG: 177 | End: 2022-02-05
Payer: MEDICARE

## 2022-02-05 ENCOUNTER — HOSPITAL ENCOUNTER (INPATIENT)
Age: 77
LOS: 5 days | Discharge: HOME HEALTH | DRG: 177 | End: 2022-02-10
Attending: EMERGENCY MEDICINE | Admitting: INTERNAL MEDICINE
Payer: MEDICARE

## 2022-02-05 DIAGNOSIS — J18.9 COMMUNITY ACQUIRED PNEUMONIA, UNSPECIFIED LATERALITY: ICD-10-CM

## 2022-02-05 DIAGNOSIS — J18.9 ATYPICAL PNEUMONIA: ICD-10-CM

## 2022-02-05 DIAGNOSIS — J18.9 MULTIFOCAL PNEUMONIA: Primary | ICD-10-CM

## 2022-02-05 LAB
ABSOLUTE EOS #: 0.08 K/UL (ref 0–0.44)
ABSOLUTE IMMATURE GRANULOCYTE: 0.03 K/UL (ref 0–0.3)
ABSOLUTE LYMPH #: 1.32 K/UL (ref 1.1–3.7)
ABSOLUTE MONO #: 0.75 K/UL (ref 0.1–1.2)
ALBUMIN SERPL-MCNC: 3.9 G/DL (ref 3.5–5.2)
ALBUMIN/GLOBULIN RATIO: 1.7 (ref 1–2.5)
ALP BLD-CCNC: 53 U/L (ref 35–104)
ALT SERPL-CCNC: 15 U/L (ref 5–33)
ANION GAP SERPL CALCULATED.3IONS-SCNC: 9 MMOL/L (ref 9–17)
AST SERPL-CCNC: 22 U/L
BASOPHILS # BLD: 0 % (ref 0–2)
BASOPHILS ABSOLUTE: <0.03 K/UL (ref 0–0.2)
BILIRUB SERPL-MCNC: 0.52 MG/DL (ref 0.3–1.2)
BNP INTERPRETATION: NORMAL
BUN BLDV-MCNC: 25 MG/DL (ref 8–23)
BUN/CREAT BLD: 24 (ref 9–20)
CALCIUM SERPL-MCNC: 9.3 MG/DL (ref 8.6–10.4)
CHLORIDE BLD-SCNC: 98 MMOL/L (ref 98–107)
CO2: 33 MMOL/L (ref 20–31)
CREAT SERPL-MCNC: 1.05 MG/DL (ref 0.5–0.9)
DIFFERENTIAL TYPE: ABNORMAL
EKG ATRIAL RATE: 110 BPM
EKG P AXIS: 52 DEGREES
EKG P-R INTERVAL: 166 MS
EKG Q-T INTERVAL: 340 MS
EKG QRS DURATION: 78 MS
EKG QTC CALCULATION (BAZETT): 460 MS
EKG R AXIS: -31 DEGREES
EKG T AXIS: 43 DEGREES
EKG VENTRICULAR RATE: 110 BPM
EOSINOPHILS RELATIVE PERCENT: 1 % (ref 1–4)
GFR AFRICAN AMERICAN: >60 ML/MIN
GFR NON-AFRICAN AMERICAN: 51 ML/MIN
GFR SERPL CREATININE-BSD FRML MDRD: ABNORMAL ML/MIN/{1.73_M2}
GFR SERPL CREATININE-BSD FRML MDRD: ABNORMAL ML/MIN/{1.73_M2}
GLUCOSE BLD-MCNC: 90 MG/DL (ref 70–99)
HCT VFR BLD CALC: 44.6 % (ref 36.3–47.1)
HEMOGLOBIN: 13.8 G/DL (ref 11.9–15.1)
IMMATURE GRANULOCYTES: 0 %
LACTIC ACID, WHOLE BLOOD: ABNORMAL MMOL/L (ref 0.7–2.1)
LACTIC ACID, WHOLE BLOOD: ABNORMAL MMOL/L (ref 0.7–2.1)
LACTIC ACID: 2.5 MMOL/L (ref 0.5–2.2)
LACTIC ACID: 3.2 MMOL/L (ref 0.5–2.2)
LYMPHOCYTES # BLD: 13 % (ref 24–43)
MAGNESIUM: 1.8 MG/DL (ref 1.6–2.6)
MCH RBC QN AUTO: 29.1 PG (ref 25.2–33.5)
MCHC RBC AUTO-ENTMCNC: 30.9 G/DL (ref 28.4–34.8)
MCV RBC AUTO: 93.9 FL (ref 82.6–102.9)
MONOCYTES # BLD: 7 % (ref 3–12)
NRBC AUTOMATED: 0 PER 100 WBC
PDW BLD-RTO: 15.2 % (ref 11.8–14.4)
PLATELET # BLD: 230 K/UL (ref 138–453)
PLATELET ESTIMATE: ABNORMAL
PMV BLD AUTO: 9.4 FL (ref 8.1–13.5)
POTASSIUM SERPL-SCNC: 3 MMOL/L (ref 3.7–5.3)
PRO-BNP: 168 PG/ML
RBC # BLD: 4.75 M/UL (ref 3.95–5.11)
RBC # BLD: ABNORMAL 10*6/UL
SARS-COV-2, RAPID: NOT DETECTED
SEG NEUTROPHILS: 79 % (ref 36–65)
SEGMENTED NEUTROPHILS ABSOLUTE COUNT: 8.18 K/UL (ref 1.5–8.1)
SODIUM BLD-SCNC: 140 MMOL/L (ref 135–144)
SPECIMEN DESCRIPTION: NORMAL
TOTAL PROTEIN: 6.2 G/DL (ref 6.4–8.3)
TROPONIN INTERP: ABNORMAL
TROPONIN INTERP: ABNORMAL
TROPONIN T: ABNORMAL NG/ML
TROPONIN T: ABNORMAL NG/ML
TROPONIN, HIGH SENSITIVITY: 105 NG/L (ref 0–14)
TROPONIN, HIGH SENSITIVITY: 108 NG/L (ref 0–14)
WBC # BLD: 10.4 K/UL (ref 3.5–11.3)
WBC # BLD: ABNORMAL 10*3/UL

## 2022-02-05 PROCEDURE — 94660 CPAP INITIATION&MGMT: CPT

## 2022-02-05 PROCEDURE — 2700000000 HC OXYGEN THERAPY PER DAY

## 2022-02-05 PROCEDURE — 96365 THER/PROPH/DIAG IV INF INIT: CPT

## 2022-02-05 PROCEDURE — 94664 DEMO&/EVAL PT USE INHALER: CPT

## 2022-02-05 PROCEDURE — 6360000004 HC RX CONTRAST MEDICATION: Performed by: EMERGENCY MEDICINE

## 2022-02-05 PROCEDURE — 93005 ELECTROCARDIOGRAM TRACING: CPT | Performed by: EMERGENCY MEDICINE

## 2022-02-05 PROCEDURE — 96367 TX/PROPH/DG ADDL SEQ IV INF: CPT

## 2022-02-05 PROCEDURE — 87635 SARS-COV-2 COVID-19 AMP PRB: CPT

## 2022-02-05 PROCEDURE — 6370000000 HC RX 637 (ALT 250 FOR IP): Performed by: INTERNAL MEDICINE

## 2022-02-05 PROCEDURE — 6370000000 HC RX 637 (ALT 250 FOR IP): Performed by: EMERGENCY MEDICINE

## 2022-02-05 PROCEDURE — 99285 EMERGENCY DEPT VISIT HI MDM: CPT

## 2022-02-05 PROCEDURE — 2580000003 HC RX 258: Performed by: INTERNAL MEDICINE

## 2022-02-05 PROCEDURE — 6360000002 HC RX W HCPCS: Performed by: EMERGENCY MEDICINE

## 2022-02-05 PROCEDURE — 83605 ASSAY OF LACTIC ACID: CPT

## 2022-02-05 PROCEDURE — 87040 BLOOD CULTURE FOR BACTERIA: CPT

## 2022-02-05 PROCEDURE — 94640 AIRWAY INHALATION TREATMENT: CPT

## 2022-02-05 PROCEDURE — 83880 ASSAY OF NATRIURETIC PEPTIDE: CPT

## 2022-02-05 PROCEDURE — 96375 TX/PRO/DX INJ NEW DRUG ADDON: CPT

## 2022-02-05 PROCEDURE — 2580000003 HC RX 258: Performed by: EMERGENCY MEDICINE

## 2022-02-05 PROCEDURE — 84484 ASSAY OF TROPONIN QUANT: CPT

## 2022-02-05 PROCEDURE — 36415 COLL VENOUS BLD VENIPUNCTURE: CPT

## 2022-02-05 PROCEDURE — 80053 COMPREHEN METABOLIC PANEL: CPT

## 2022-02-05 PROCEDURE — 71260 CT THORAX DX C+: CPT

## 2022-02-05 PROCEDURE — 85025 COMPLETE CBC W/AUTO DIFF WBC: CPT

## 2022-02-05 PROCEDURE — 6360000002 HC RX W HCPCS: Performed by: INTERNAL MEDICINE

## 2022-02-05 PROCEDURE — 0202U NFCT DS 22 TRGT SARS-COV-2: CPT

## 2022-02-05 PROCEDURE — 2000000000 HC ICU R&B

## 2022-02-05 PROCEDURE — 71045 X-RAY EXAM CHEST 1 VIEW: CPT

## 2022-02-05 PROCEDURE — 83735 ASSAY OF MAGNESIUM: CPT

## 2022-02-05 PROCEDURE — 93010 ELECTROCARDIOGRAM REPORT: CPT | Performed by: FAMILY MEDICINE

## 2022-02-05 RX ORDER — ONDANSETRON 2 MG/ML
4 INJECTION INTRAMUSCULAR; INTRAVENOUS EVERY 6 HOURS PRN
Status: DISCONTINUED | OUTPATIENT
Start: 2022-02-05 | End: 2022-02-10 | Stop reason: HOSPADM

## 2022-02-05 RX ORDER — 0.9 % SODIUM CHLORIDE 0.9 %
1000 INTRAVENOUS SOLUTION INTRAVENOUS ONCE
Status: COMPLETED | OUTPATIENT
Start: 2022-02-05 | End: 2022-02-05

## 2022-02-05 RX ORDER — SODIUM CHLORIDE 9 MG/ML
25 INJECTION, SOLUTION INTRAVENOUS PRN
Status: DISCONTINUED | OUTPATIENT
Start: 2022-02-05 | End: 2022-02-10 | Stop reason: HOSPADM

## 2022-02-05 RX ORDER — ALBUTEROL SULFATE 90 UG/1
2 AEROSOL, METERED RESPIRATORY (INHALATION) 4 TIMES DAILY
Status: DISCONTINUED | OUTPATIENT
Start: 2022-02-05 | End: 2022-02-05

## 2022-02-05 RX ORDER — SODIUM CHLORIDE 0.9 % (FLUSH) 0.9 %
5-40 SYRINGE (ML) INJECTION EVERY 12 HOURS SCHEDULED
Status: DISCONTINUED | OUTPATIENT
Start: 2022-02-05 | End: 2022-02-10 | Stop reason: HOSPADM

## 2022-02-05 RX ORDER — FUROSEMIDE 20 MG/1
20 TABLET ORAL DAILY
Status: DISCONTINUED | OUTPATIENT
Start: 2022-02-05 | End: 2022-02-10 | Stop reason: HOSPADM

## 2022-02-05 RX ORDER — ALBUTEROL SULFATE 90 UG/1
2 AEROSOL, METERED RESPIRATORY (INHALATION) EVERY 4 HOURS PRN
Status: DISCONTINUED | OUTPATIENT
Start: 2022-02-05 | End: 2022-02-10 | Stop reason: HOSPADM

## 2022-02-05 RX ORDER — VITAMIN B COMPLEX
2000 TABLET ORAL DAILY
Status: DISCONTINUED | OUTPATIENT
Start: 2022-02-05 | End: 2022-02-10 | Stop reason: HOSPADM

## 2022-02-05 RX ORDER — LEVOTHYROXINE SODIUM 0.15 MG/1
150 TABLET ORAL DAILY
Status: DISCONTINUED | OUTPATIENT
Start: 2022-02-05 | End: 2022-02-10 | Stop reason: HOSPADM

## 2022-02-05 RX ORDER — LEVOFLOXACIN 5 MG/ML
750 INJECTION, SOLUTION INTRAVENOUS
Status: DISCONTINUED | OUTPATIENT
Start: 2022-02-05 | End: 2022-02-05 | Stop reason: ALTCHOICE

## 2022-02-05 RX ORDER — ACETAMINOPHEN 500 MG
1000 TABLET ORAL ONCE
Status: COMPLETED | OUTPATIENT
Start: 2022-02-05 | End: 2022-02-05

## 2022-02-05 RX ORDER — SODIUM CHLORIDE 0.9 % (FLUSH) 0.9 %
5-40 SYRINGE (ML) INJECTION PRN
Status: DISCONTINUED | OUTPATIENT
Start: 2022-02-05 | End: 2022-02-10 | Stop reason: HOSPADM

## 2022-02-05 RX ORDER — AZITHROMYCIN 250 MG/1
500 TABLET, FILM COATED ORAL EVERY 24 HOURS
Status: DISCONTINUED | OUTPATIENT
Start: 2022-02-05 | End: 2022-02-06

## 2022-02-05 RX ORDER — METHYLPREDNISOLONE SODIUM SUCCINATE 125 MG/2ML
125 INJECTION, POWDER, LYOPHILIZED, FOR SOLUTION INTRAMUSCULAR; INTRAVENOUS ONCE
Status: COMPLETED | OUTPATIENT
Start: 2022-02-05 | End: 2022-02-05

## 2022-02-05 RX ORDER — ACETAMINOPHEN 325 MG/1
650 TABLET ORAL EVERY 6 HOURS PRN
Status: DISCONTINUED | OUTPATIENT
Start: 2022-02-05 | End: 2022-02-10 | Stop reason: HOSPADM

## 2022-02-05 RX ORDER — ACETAMINOPHEN 650 MG/1
650 SUPPOSITORY RECTAL EVERY 6 HOURS PRN
Status: DISCONTINUED | OUTPATIENT
Start: 2022-02-05 | End: 2022-02-10 | Stop reason: HOSPADM

## 2022-02-05 RX ORDER — ASPIRIN 81 MG/1
81 TABLET, CHEWABLE ORAL DAILY
Status: DISCONTINUED | OUTPATIENT
Start: 2022-02-05 | End: 2022-02-10 | Stop reason: HOSPADM

## 2022-02-05 RX ORDER — PREGABALIN 75 MG/1
300 CAPSULE ORAL 2 TIMES DAILY
Status: DISCONTINUED | OUTPATIENT
Start: 2022-02-05 | End: 2022-02-10 | Stop reason: HOSPADM

## 2022-02-05 RX ORDER — PANTOPRAZOLE SODIUM 40 MG/1
40 TABLET, DELAYED RELEASE ORAL
Status: DISCONTINUED | OUTPATIENT
Start: 2022-02-06 | End: 2022-02-09

## 2022-02-05 RX ORDER — HYDROCODONE BITARTRATE AND ACETAMINOPHEN 10; 325 MG/1; MG/1
1 TABLET ORAL EVERY 6 HOURS PRN
Status: DISCONTINUED | OUTPATIENT
Start: 2022-02-05 | End: 2022-02-10 | Stop reason: HOSPADM

## 2022-02-05 RX ORDER — ALBUTEROL SULFATE 2.5 MG/3ML
2.5 SOLUTION RESPIRATORY (INHALATION)
Status: DISCONTINUED | OUTPATIENT
Start: 2022-02-05 | End: 2022-02-10 | Stop reason: HOSPADM

## 2022-02-05 RX ORDER — DULOXETIN HYDROCHLORIDE 60 MG/1
60 CAPSULE, DELAYED RELEASE ORAL DAILY
Status: DISCONTINUED | OUTPATIENT
Start: 2022-02-05 | End: 2022-02-10 | Stop reason: HOSPADM

## 2022-02-05 RX ORDER — POTASSIUM CHLORIDE 1.5 G/1.77G
20 POWDER, FOR SOLUTION ORAL 2 TIMES DAILY
Status: DISCONTINUED | OUTPATIENT
Start: 2022-02-05 | End: 2022-02-05 | Stop reason: ALTCHOICE

## 2022-02-05 RX ORDER — HYDROXYCHLOROQUINE SULFATE 200 MG/1
200 TABLET, FILM COATED ORAL DAILY
Status: DISCONTINUED | OUTPATIENT
Start: 2022-02-05 | End: 2022-02-10 | Stop reason: HOSPADM

## 2022-02-05 RX ORDER — TRAZODONE HYDROCHLORIDE 50 MG/1
200 TABLET ORAL NIGHTLY
Status: DISCONTINUED | OUTPATIENT
Start: 2022-02-05 | End: 2022-02-10 | Stop reason: HOSPADM

## 2022-02-05 RX ORDER — ALENDRONATE SODIUM 70 MG/1
70 TABLET ORAL
Status: DISCONTINUED | OUTPATIENT
Start: 2022-02-05 | End: 2022-02-05 | Stop reason: CLARIF

## 2022-02-05 RX ORDER — ONDANSETRON 4 MG/1
4 TABLET, ORALLY DISINTEGRATING ORAL EVERY 8 HOURS PRN
Status: DISCONTINUED | OUTPATIENT
Start: 2022-02-05 | End: 2022-02-10 | Stop reason: HOSPADM

## 2022-02-05 RX ORDER — POLYETHYLENE GLYCOL 3350 17 G/17G
17 POWDER, FOR SOLUTION ORAL DAILY PRN
Status: DISCONTINUED | OUTPATIENT
Start: 2022-02-05 | End: 2022-02-10 | Stop reason: HOSPADM

## 2022-02-05 RX ORDER — IPRATROPIUM BROMIDE AND ALBUTEROL SULFATE 2.5; .5 MG/3ML; MG/3ML
SOLUTION RESPIRATORY (INHALATION)
Status: DISPENSED
Start: 2022-02-05 | End: 2022-02-06

## 2022-02-05 RX ORDER — ASCORBIC ACID 500 MG
1000 TABLET ORAL 2 TIMES DAILY
Status: DISCONTINUED | OUTPATIENT
Start: 2022-02-05 | End: 2022-02-10 | Stop reason: HOSPADM

## 2022-02-05 RX ORDER — IPRATROPIUM BROMIDE AND ALBUTEROL SULFATE 2.5; .5 MG/3ML; MG/3ML
1 SOLUTION RESPIRATORY (INHALATION)
Status: DISCONTINUED | OUTPATIENT
Start: 2022-02-05 | End: 2022-02-06

## 2022-02-05 RX ADMIN — ACETAMINOPHEN 1000 MG: 500 TABLET ORAL at 11:20

## 2022-02-05 RX ADMIN — CALCIUM CARBONATE-VITAMIN D TAB 500 MG-200 UNIT 1 TABLET: 500-200 TAB at 16:52

## 2022-02-05 RX ADMIN — SODIUM CHLORIDE 999 ML: 9 INJECTION, SOLUTION INTRAVENOUS at 15:12

## 2022-02-05 RX ADMIN — IPRATROPIUM BROMIDE AND ALBUTEROL SULFATE 1 AMPULE: 2.5; .5 SOLUTION RESPIRATORY (INHALATION) at 20:52

## 2022-02-05 RX ADMIN — SODIUM CHLORIDE 1000 ML: 9 INJECTION, SOLUTION INTRAVENOUS at 11:21

## 2022-02-05 RX ADMIN — DULOXETINE 60 MG: 60 CAPSULE, DELAYED RELEASE ORAL at 16:52

## 2022-02-05 RX ADMIN — LEVOTHYROXINE SODIUM 150 MCG: 150 TABLET ORAL at 16:52

## 2022-02-05 RX ADMIN — CEFTRIAXONE 1000 MG: 1 INJECTION, POWDER, FOR SOLUTION INTRAMUSCULAR; INTRAVENOUS at 17:00

## 2022-02-05 RX ADMIN — RIVAROXABAN 20 MG: 20 TABLET, FILM COATED ORAL at 19:28

## 2022-02-05 RX ADMIN — IPRATROPIUM BROMIDE AND ALBUTEROL SULFATE 1 AMPULE: 2.5; .5 SOLUTION RESPIRATORY (INHALATION) at 13:53

## 2022-02-05 RX ADMIN — VANCOMYCIN HYDROCHLORIDE 1000 MG: 1 INJECTION, POWDER, LYOPHILIZED, FOR SOLUTION INTRAVENOUS at 13:18

## 2022-02-05 RX ADMIN — Medication 2000 UNITS: at 16:52

## 2022-02-05 RX ADMIN — CEFEPIME HYDROCHLORIDE 2000 MG: 2 INJECTION, POWDER, FOR SOLUTION INTRAVENOUS at 12:36

## 2022-02-05 RX ADMIN — FUROSEMIDE 20 MG: 20 TABLET ORAL at 16:52

## 2022-02-05 RX ADMIN — METHYLPREDNISOLONE SODIUM SUCCINATE 125 MG: 125 INJECTION, POWDER, FOR SOLUTION INTRAMUSCULAR; INTRAVENOUS at 13:28

## 2022-02-05 RX ADMIN — ASPIRIN 81 MG: 81 TABLET, CHEWABLE ORAL at 16:52

## 2022-02-05 RX ADMIN — POTASSIUM BICARBONATE 40 MEQ: 782 TABLET, EFFERVESCENT ORAL at 12:16

## 2022-02-05 RX ADMIN — AZITHROMYCIN MONOHYDRATE 500 MG: 250 TABLET ORAL at 16:52

## 2022-02-05 RX ADMIN — HYDROXYCHLOROQUINE SULFATE 200 MG: 200 TABLET ORAL at 16:52

## 2022-02-05 RX ADMIN — IOPAMIDOL 75 ML: 755 INJECTION, SOLUTION INTRAVENOUS at 12:20

## 2022-02-05 ASSESSMENT — PAIN DESCRIPTION - LOCATION
LOCATION: GENERALIZED
LOCATION: BACK

## 2022-02-05 ASSESSMENT — PAIN DESCRIPTION - DESCRIPTORS
DESCRIPTORS: ACHING;SORE
DESCRIPTORS: ACHING;SORE

## 2022-02-05 ASSESSMENT — PAIN SCALES - GENERAL
PAINLEVEL_OUTOF10: 7
PAINLEVEL_OUTOF10: 7
PAINLEVEL_OUTOF10: 0

## 2022-02-05 ASSESSMENT — PAIN - FUNCTIONAL ASSESSMENT: PAIN_FUNCTIONAL_ASSESSMENT: ACTIVITIES ARE NOT PREVENTED

## 2022-02-05 ASSESSMENT — PAIN DESCRIPTION - PROGRESSION: CLINICAL_PROGRESSION: NOT CHANGED

## 2022-02-05 ASSESSMENT — PAIN DESCRIPTION - FREQUENCY: FREQUENCY: CONTINUOUS

## 2022-02-05 ASSESSMENT — PAIN DESCRIPTION - PAIN TYPE: TYPE: CHRONIC PAIN

## 2022-02-05 ASSESSMENT — PAIN DESCRIPTION - ONSET: ONSET: ON-GOING

## 2022-02-05 NOTE — ED PROVIDER NOTES
[facility] COMPLAINT   Chief Complaint   Patient presents with    Shortness of Breath     Onset yesterday. Pt has been treated for pneumonia frequently. Pt reports she finished a round of anitibiotic      HPI   Marco Mack is a 77 y.o. female who presents with shortness of breath. The onset was a couple days ago. She has recently had approximately 3 episodes of pneumonia. The last episode she had involved inpatient mission from the 15th of the 18th of last month. She was then discharged on Augmentin for pneumonia. . The duration has been constant since the onset. The shortness of breath worsens with exertion. The quality of shortness of breath as a dyspnea. The patient has associated cough. Oxygen saturations were in the 80s on room air. Temp was 99. REVIEW OF SYSTEMS   Cardiac: Denies palpitations, Denies syncope  Respiratory: +SOB and cough as above  Neurologic: Denies syncope or LOC  GI: Denies Vomiting, Denies Diarrhea  General: Denies measured Fever  All other review of systems otherwise negative.      PAST MEDICAL & SURGICAL HISTORY   Past Medical History:   Diagnosis Date    Chronic pain syndrome     dilaudid infusion pump    COPD (chronic obstructive pulmonary disease) (HCC)     Depression     GERD (gastroesophageal reflux disease)     Hypothyroidism     Osteoporosis      Past Surgical History:   Procedure Laterality Date    BACK SURGERY      BREAST SURGERY      CARPAL TUNNEL RELEASE      FRACTURE SURGERY Left 12/20/2018    ORIF wrist    HYSTERECTOMY      JOINT REPLACEMENT      right knee    JOINT REPLACEMENT      WRIST FRACTURE SURGERY Left 12/20/2018    WRIST OPEN REDUCTION INTERNAL FIXATION-DISTAL RADIUS performed by Veronica Cespedes MD at [address]   Current Outpatient Rx   Medication Sig Dispense Refill    dextrose 5 % SOLN with HYDROmorphone HCl PF 10 MG/ML SOLN 1 mg/mL Infuse intravenously continuous Pt has continuous dilaudid infusion pump implanted, but is unsure of dosage. 2.797 mg/day      furosemide (LASIX) 20 MG tablet Take 1 tablet by mouth daily (Patient taking differently: Take 20 mg by mouth daily PRN) 90 tablet 3    rivaroxaban (XARELTO) 20 MG TABS tablet Take 1 tablet by mouth daily (with breakfast) 90 tablet 3    HYDROcodone-acetaminophen (NORCO)  MG per tablet Take 1 tablet by mouth every 6 hours as needed for Pain.  Hydroxychloroquine Sulfate (PLAQUENIL PO) Take 1 tablet by mouth daily       albuterol sulfate  (90 Base) MCG/ACT inhaler Inhale 2 puffs into the lungs 4 times daily 1 Inhaler 0    levothyroxine (SYNTHROID) 150 MCG tablet Take 150 mcg by mouth Daily      aspirin 81 MG tablet Take 81 mg by mouth daily      DULoxetine (CYMBALTA) 60 MG extended release capsule Take 60 mg by mouth daily      traZODone (DESYREL) 100 MG tablet Take 200 mg by mouth nightly       pantoprazole sodium (PROTONIX) 40 MG PACK packet Take 40 mg by mouth 2 times daily (before meals)      potassium chloride (KLOR-CON) 20 MEQ packet Take 20 mEq by mouth 2 times daily      calcium citrate-vitamin D (CITRICAL + D) 315-250 MG-UNIT TABS per tablet Take 1 tablet by mouth 2 times daily (with meals)      alendronate (FOSAMAX) 70 MG tablet Take 70 mg by mouth every 7 days On Sundays      ascorbic acid (VITAMIN C) 1000 MG tablet Take 1 tablet by mouth 2 times daily for 7 days 14 tablet 0    Vitamin D (CHOLECALCIFEROL) 50 MCG (2000 UT) TABS tablet Take 1 tablet by mouth daily for 7 days 7 tablet 0    pregabalin (LYRICA) 300 MG capsule Take 1 capsule by mouth 2 times daily for 30 days.  60 capsule 0      ALLERGIES   No Known Allergies   SOCIAL & FAMILY HISTORY   Social History     Socioeconomic History    Marital status:      Spouse name: None    Number of children: None    Years of education: None    Highest education level: None   Occupational History    None   Tobacco Use    Smoking status: Former Smoker     Packs/day: 1.00     Years: 10.00     Pack years: 10.00     Quit date: 1976     Years since quittin.2    Smokeless tobacco: Never Used   Vaping Use    Vaping Use: Never used   Substance and Sexual Activity    Alcohol use: No    Drug use: No    Sexual activity: None   Other Topics Concern    None   Social History Narrative    None     Social Determinants of Health     Financial Resource Strain:     Difficulty of Paying Living Expenses: Not on file   Food Insecurity:     Worried About Running Out of Food in the Last Year: Not on file    Concepcion of Food in the Last Year: Not on file   Transportation Needs:     Lack of Transportation (Medical): Not on file    Lack of Transportation (Non-Medical):  Not on file   Physical Activity:     Days of Exercise per Week: Not on file    Minutes of Exercise per Session: Not on file   Stress:     Feeling of Stress : Not on file   Social Connections:     Frequency of Communication with Friends and Family: Not on file    Frequency of Social Gatherings with Friends and Family: Not on file    Attends [employer] Services: Not on file    Active Member of [address] [place] [address] [employer] or Organizations: Not on file    Attends Club or Organization Meetings: Not on file    Marital Status: Not on file   Intimate Partner Violence:     Fear of Current or Ex-Partner: Not on file    Emotionally Abused: Not on file    Physically Abused: Not on file    Sexually Abused: Not on file   Housing Stability:     Unable to Pay for Housing in the Last Year: Not on file    Number of [place] in the Last Year: Not on file    Unstable Housing in the Last Year: Not on file     Family History   Problem Relation Age of Onset    Heart Attack Father 61    Heart Attack Sister     Heart Disease Brother         PHYSICAL EXAM   VITAL SIGNS: BP 97/76   Pulse 118   Temp 99 °F (37.2 °C) (Tympanic)   Resp 22   Ht 5' 4\" (1.626 m)   Wt 155 lb (70.3 kg)   SpO2 92%   BMI 26.61 kg/m²    Constitutional: Well developed, well nourished, no acute distress but with some labored breathing  HENT: Atraumatic, moist mucus membranes  Neck: supple, no JVD   Respiratory: Lungs reveal diminished lung sounds bilaterally with a prolonged expiratory phase, and inspiratory and expiratory rhonchi and inspiratory wheezes, no crackles, no retractions   Cardiovascular: tachy  rate, no murmurs  GI: Soft, nontender, normal bowel sounds  Musculoskeletal: no bilateral LE edema, no acute deformities  Integument: Skin is warm and dry, no petechiae   Neurologic: Alert & oriented, normal speech  Psych: Pleasant affect, the patient does not appear anxious     EKG (Interpreted by me)  sinus tach rhythm at 110 BPM, no stemi      RADIOLOGY/PROCEDURES   CT CHEST PULMONARY EMBOLISM W CONTRAST   Final Result   No evidence of pulmonary embolism. Similar extensive persistent pulmonary abnormalities/bronchitis compatible   with the history of COVID pneumonia. Some improvement noted left base but   worsening seen on the right, mostly RLL; consider increasing viral or   possibly superimposed bacterial or other bronchopneumonia with some   consolidation. Large hiatus hernia. Similar mild mediastinal adenopathy. Additional unchanged findings, as above. RECOMMENDATIONS:   Unavailable         XR CHEST PORTABLE   Final Result   Persistent opacities most significant in the left mid lung and concerning for   unchanged multifocal pneumonia. Clinical and imaging follow-up to resolution   recommended. ED COURSE & MEDICAL DECISION MAKING   Pertinent Labs & Imaging studies reviewed and interpreted. (See chart for details)     See electronic medical record for any other medications ordered.   Vitals:    02/05/22 1112   BP:    Pulse: 118   Resp: 22   Temp:    SpO2: 92%     Consultation: Dr. Shiva Gillespie was consulted for admission (paged at 2:10 PM)  Differential Diagnosis: COPD exacerbation, foreign body aspiration, Congestive Heart Failure, Myocardial Infarction, Pulmonary Embolus, Pneumonia, Pneumothorax, other     CRITICAL CARE NOTE:  There was a high probability of clinically significant life-threatening deterioration of the patient's condition requiring my urgent intervention. Total critical care time is 30 minutes. This includes vital sign monitoring, pulse oximetry monitoring, telemetry monitoring, clinical response to the IV medications, interpretation of labs, reviewing the nursing notes, consultation time, dictation/documetation time. MDM: Patient's imaging is very suspicious for Covid though she is tested negative. I am putting her on broad-spectrum antibiotics for multifocal pneumonia perhaps hospital-acquired. I discussed the case with Dr. Dane Wright and the patient is admitted. FINAL IMPRESSION   1.  Multifocal pneumonia      PLAN  Admit  Electronically signed by: Jimy Pettit MD, 2/5/2022 2:10 PM  (This note was completed [facility] a voice recognition program)            Jimy Pettit MD  02/05/22 [id number]

## 2022-02-05 NOTE — PROGRESS NOTES
PHARMACY NOTE    Golden Burkitt was ordered the oral bisphosphonate, Fosamax. Oral Bisphosphonates have an increased risk of serious GI events if the strict dosing instructions are not followed. Per the FDA labeling, oral bisphosphonates must be taken at least 30 min (60 min for ibandronate) before the first food, beverage or medication of the day. Patients MUST also avoid lying down for at least 30 min (60 minutes for ibandronate) after taking the oral bisphosphonate. Because these strict dosage instructions are difficult to follow in many hospitalized patients, orders for oral bisphosphonate therapy will be discontinued per the [address] [place] [address]. Consider risk versus benefits when resuming the oral bisphosphonate at discharge. Anderson Kimbrough, Pharm. D.  2/5/2022  3:54 PM

## 2022-02-05 NOTE — ED NOTES
Dr. Alonso Romero called back and connected with Dr. Essence Lamb.      Shalini Randhawa  02/05/22 5413

## 2022-02-05 NOTE — PROGRESS NOTES
Pt arrived on unit to room 308 from ER accompanied by . Pt moved from stretcher to bed 3 assist. Pt placed back on BiPAP. Disaster admission navigator completed. PCR swab completed, and lactic acid drawn. No other needs noted at this time. Call light in reach. Will continue to monitor.

## 2022-02-05 NOTE — PROGRESS NOTES
Received return call from Dr Josie Helm. Discontinue Zosyn, Levaquin, Lovenox, discontinue potassium 40 daily, and resume pts home potassium order.

## 2022-02-06 LAB
ABSOLUTE EOS #: 0 K/UL (ref 0–0.44)
ABSOLUTE IMMATURE GRANULOCYTE: 0 K/UL (ref 0–0.3)
ABSOLUTE LYMPH #: 0.88 K/UL (ref 1.1–3.7)
ABSOLUTE MONO #: 0.29 K/UL (ref 0.1–1.2)
ADENOVIRUS PCR: NOT DETECTED
ALLEN TEST: ABNORMAL
ANION GAP SERPL CALCULATED.3IONS-SCNC: 6 MMOL/L (ref 9–17)
BASOPHILS # BLD: 0 % (ref 0–2)
BASOPHILS ABSOLUTE: 0 K/UL (ref 0–0.2)
BORDETELLA PARAPERTUSSIS: NOT DETECTED
BORDETELLA PERTUSSIS PCR: NOT DETECTED
BUN BLDV-MCNC: 23 MG/DL (ref 8–23)
BUN/CREAT BLD: 42 (ref 9–20)
CALCIUM SERPL-MCNC: 9.2 MG/DL (ref 8.6–10.4)
CARBOXYHEMOGLOBIN: ABNORMAL % (ref 0–5)
CHLAMYDIA PNEUMONIAE BY PCR: NOT DETECTED
CHLORIDE BLD-SCNC: 101 MMOL/L (ref 98–107)
CO2: 28 MMOL/L (ref 20–31)
CORONAVIRUS 229E PCR: NOT DETECTED
CORONAVIRUS HKU1 PCR: NOT DETECTED
CORONAVIRUS NL63 PCR: NOT DETECTED
CORONAVIRUS OC43 PCR: NOT DETECTED
CREAT SERPL-MCNC: 0.55 MG/DL (ref 0.5–0.9)
DIFFERENTIAL TYPE: ABNORMAL
EOSINOPHILS RELATIVE PERCENT: 0 % (ref 1–4)
FIO2: ABNORMAL
GFR AFRICAN AMERICAN: >60 ML/MIN
GFR NON-AFRICAN AMERICAN: >60 ML/MIN
GFR SERPL CREATININE-BSD FRML MDRD: ABNORMAL ML/MIN/{1.73_M2}
GFR SERPL CREATININE-BSD FRML MDRD: ABNORMAL ML/MIN/{1.73_M2}
GLUCOSE BLD-MCNC: 125 MG/DL (ref 70–99)
HCO3 ARTERIAL: 32.7 MMOL/L (ref 22–26)
HCT VFR BLD CALC: 37.6 % (ref 36.3–47.1)
HEMOGLOBIN: 11.9 G/DL (ref 11.9–15.1)
HUMAN METAPNEUMOVIRUS PCR: NOT DETECTED
IMMATURE GRANULOCYTES: 0 %
INFLUENZA A BY PCR: NOT DETECTED
INFLUENZA A H1 (2009) PCR: NORMAL
INFLUENZA A H1 PCR: NORMAL
INFLUENZA A H3 PCR: NORMAL
INFLUENZA B BY PCR: NOT DETECTED
LACTIC ACID, WHOLE BLOOD: NORMAL MMOL/L (ref 0.7–2.1)
LACTIC ACID: 2 MMOL/L (ref 0.5–2.2)
LYMPHOCYTES # BLD: 6 % (ref 24–43)
MCH RBC QN AUTO: 29.6 PG (ref 25.2–33.5)
MCHC RBC AUTO-ENTMCNC: 31.6 G/DL (ref 28.4–34.8)
MCV RBC AUTO: 93.5 FL (ref 82.6–102.9)
METHEMOGLOBIN: ABNORMAL % (ref 0–1.9)
MODE: ABNORMAL
MONOCYTES # BLD: 2 % (ref 3–12)
MORPHOLOGY: NORMAL
MYCOPLASMA PNEUMONIAE PCR: NOT DETECTED
NEGATIVE BASE EXCESS, ART: ABNORMAL MMOL/L (ref 0–2)
NOTIFICATION TIME: ABNORMAL
NOTIFICATION: ABNORMAL
NRBC AUTOMATED: 0 PER 100 WBC
O2 DEVICE/FLOW/%: ABNORMAL
O2 SAT, ARTERIAL: 95 % (ref 95–98)
OXYHEMOGLOBIN: ABNORMAL % (ref 95–98)
PARAINFLUENZA 1 PCR: NOT DETECTED
PARAINFLUENZA 2 PCR: NOT DETECTED
PARAINFLUENZA 3 PCR: NOT DETECTED
PARAINFLUENZA 4 PCR: NOT DETECTED
PATIENT TEMP: 37
PCO2 ARTERIAL: 54.3 MMHG (ref 35–45)
PCO2, ART, TEMP ADJ: ABNORMAL
PDW BLD-RTO: 15.6 % (ref 11.8–14.4)
PEEP/CPAP: ABNORMAL
PH ARTERIAL: 7.4 (ref 7.35–7.45)
PH, ART, TEMP ADJ: ABNORMAL (ref 7.35–7.45)
PLATELET # BLD: 193 K/UL (ref 138–453)
PLATELET ESTIMATE: ABNORMAL
PMV BLD AUTO: 9.5 FL (ref 8.1–13.5)
PO2 ARTERIAL: 75.9 MMHG (ref 80–100)
PO2, ART, TEMP ADJ: ABNORMAL MMHG (ref 80–100)
POSITIVE BASE EXCESS, ART: 6.2 MMOL/L (ref 0–2)
POTASSIUM SERPL-SCNC: 4.5 MMOL/L (ref 3.7–5.3)
PSV: ABNORMAL
PT. POSITION: ABNORMAL
RBC # BLD: 4.02 M/UL (ref 3.95–5.11)
RBC # BLD: ABNORMAL 10*6/UL
RESP SYNCYTIAL VIRUS PCR: NOT DETECTED
RESPIRATORY RATE: ABNORMAL
RHINO/ENTEROVIRUS PCR: NOT DETECTED
SAMPLE SITE: ABNORMAL
SARS-COV-2, PCR: NOT DETECTED
SEG NEUTROPHILS: 92 % (ref 36–65)
SEGMENTED NEUTROPHILS ABSOLUTE COUNT: 13.43 K/UL (ref 1.5–8.1)
SET RATE: ABNORMAL
SODIUM BLD-SCNC: 135 MMOL/L (ref 135–144)
SPECIMEN DESCRIPTION: NORMAL
TEXT FOR RESPIRATORY: ABNORMAL
TOTAL HB: ABNORMAL G/DL (ref 12–16)
TOTAL RATE: ABNORMAL
TROPONIN INTERP: ABNORMAL
TROPONIN T: ABNORMAL NG/ML
TROPONIN, HIGH SENSITIVITY: 27 NG/L (ref 0–14)
VT: ABNORMAL
WBC # BLD: 14.6 K/UL (ref 3.5–11.3)
WBC # BLD: ABNORMAL 10*3/UL

## 2022-02-06 PROCEDURE — 6370000000 HC RX 637 (ALT 250 FOR IP): Performed by: INTERNAL MEDICINE

## 2022-02-06 PROCEDURE — 2580000003 HC RX 258: Performed by: INTERNAL MEDICINE

## 2022-02-06 PROCEDURE — 36415 COLL VENOUS BLD VENIPUNCTURE: CPT

## 2022-02-06 PROCEDURE — 94660 CPAP INITIATION&MGMT: CPT

## 2022-02-06 PROCEDURE — 6360000002 HC RX W HCPCS: Performed by: INTERNAL MEDICINE

## 2022-02-06 PROCEDURE — 94664 DEMO&/EVAL PT USE INHALER: CPT

## 2022-02-06 PROCEDURE — 2000000000 HC ICU R&B

## 2022-02-06 PROCEDURE — 2700000000 HC OXYGEN THERAPY PER DAY

## 2022-02-06 PROCEDURE — 94640 AIRWAY INHALATION TREATMENT: CPT

## 2022-02-06 PROCEDURE — 80048 BASIC METABOLIC PNL TOTAL CA: CPT

## 2022-02-06 PROCEDURE — 92610 EVALUATE SWALLOWING FUNCTION: CPT

## 2022-02-06 PROCEDURE — 84484 ASSAY OF TROPONIN QUANT: CPT

## 2022-02-06 PROCEDURE — 85025 COMPLETE CBC W/AUTO DIFF WBC: CPT

## 2022-02-06 PROCEDURE — 99223 1ST HOSP IP/OBS HIGH 75: CPT | Performed by: INTERNAL MEDICINE

## 2022-02-06 PROCEDURE — 36600 WITHDRAWAL OF ARTERIAL BLOOD: CPT

## 2022-02-06 PROCEDURE — 82805 BLOOD GASES W/O2 SATURATION: CPT

## 2022-02-06 PROCEDURE — 97162 PT EVAL MOD COMPLEX 30 MIN: CPT

## 2022-02-06 PROCEDURE — 6370000000 HC RX 637 (ALT 250 FOR IP): Performed by: EMERGENCY MEDICINE

## 2022-02-06 PROCEDURE — 83605 ASSAY OF LACTIC ACID: CPT

## 2022-02-06 PROCEDURE — 94761 N-INVAS EAR/PLS OXIMETRY MLT: CPT

## 2022-02-06 RX ORDER — IPRATROPIUM BROMIDE AND ALBUTEROL SULFATE 2.5; .5 MG/3ML; MG/3ML
SOLUTION RESPIRATORY (INHALATION)
Status: DISPENSED
Start: 2022-02-06 | End: 2022-02-06

## 2022-02-06 RX ORDER — IPRATROPIUM BROMIDE AND ALBUTEROL SULFATE 2.5; .5 MG/3ML; MG/3ML
1 SOLUTION RESPIRATORY (INHALATION) 4 TIMES DAILY
Status: DISCONTINUED | OUTPATIENT
Start: 2022-02-06 | End: 2022-02-10 | Stop reason: HOSPADM

## 2022-02-06 RX ORDER — PREDNISONE 1 MG/1
5 TABLET ORAL DAILY
Status: DISCONTINUED | OUTPATIENT
Start: 2022-02-22 | End: 2022-02-10 | Stop reason: HOSPADM

## 2022-02-06 RX ORDER — PREDNISONE 20 MG/1
20 TABLET ORAL DAILY
Status: DISCONTINUED | OUTPATIENT
Start: 2022-02-12 | End: 2022-02-10 | Stop reason: HOSPADM

## 2022-02-06 RX ORDER — PREDNISONE 10 MG/1
10 TABLET ORAL DAILY
Status: DISCONTINUED | OUTPATIENT
Start: 2022-02-17 | End: 2022-02-10 | Stop reason: HOSPADM

## 2022-02-06 RX ADMIN — Medication 1000 MG: at 08:37

## 2022-02-06 RX ADMIN — HYDROCODONE BITARTRATE AND ACETAMINOPHEN 1 TABLET: 10; 325 TABLET ORAL at 22:41

## 2022-02-06 RX ADMIN — SODIUM CHLORIDE, PRESERVATIVE FREE 10 ML: 5 INJECTION INTRAVENOUS at 22:43

## 2022-02-06 RX ADMIN — HYDROCODONE BITARTRATE AND ACETAMINOPHEN 1 TABLET: 10; 325 TABLET ORAL at 16:58

## 2022-02-06 RX ADMIN — HYDROXYCHLOROQUINE SULFATE 200 MG: 200 TABLET ORAL at 08:37

## 2022-02-06 RX ADMIN — TRAZODONE HYDROCHLORIDE 200 MG: 50 TABLET ORAL at 01:10

## 2022-02-06 RX ADMIN — IPRATROPIUM BROMIDE AND ALBUTEROL SULFATE 1 AMPULE: 2.5; .5 SOLUTION RESPIRATORY (INHALATION) at 05:45

## 2022-02-06 RX ADMIN — PREGABALIN 300 MG: 75 CAPSULE ORAL at 22:41

## 2022-02-06 RX ADMIN — DULOXETINE 60 MG: 60 CAPSULE, DELAYED RELEASE ORAL at 08:37

## 2022-02-06 RX ADMIN — POTASSIUM BICARBONATE 20 MEQ: 782 TABLET, EFFERVESCENT ORAL at 01:10

## 2022-02-06 RX ADMIN — Medication 1000 MG: at 01:10

## 2022-02-06 RX ADMIN — POTASSIUM BICARBONATE 20 MEQ: 782 TABLET, EFFERVESCENT ORAL at 22:41

## 2022-02-06 RX ADMIN — POTASSIUM BICARBONATE 20 MEQ: 782 TABLET, EFFERVESCENT ORAL at 08:37

## 2022-02-06 RX ADMIN — SODIUM CHLORIDE, PRESERVATIVE FREE 10 ML: 5 INJECTION INTRAVENOUS at 08:37

## 2022-02-06 RX ADMIN — IPRATROPIUM BROMIDE AND ALBUTEROL SULFATE 1 AMPULE: 2.5; .5 SOLUTION RESPIRATORY (INHALATION) at 10:30

## 2022-02-06 RX ADMIN — AZITHROMYCIN MONOHYDRATE 500 MG: 250 TABLET ORAL at 16:58

## 2022-02-06 RX ADMIN — Medication 1000 MG: at 22:41

## 2022-02-06 RX ADMIN — PIPERACILLIN AND TAZOBACTAM 3375 MG: 3; .375 INJECTION, POWDER, FOR SOLUTION INTRAVENOUS at 18:28

## 2022-02-06 RX ADMIN — FUROSEMIDE 20 MG: 20 TABLET ORAL at 08:37

## 2022-02-06 RX ADMIN — TRAZODONE HYDROCHLORIDE 200 MG: 50 TABLET ORAL at 22:40

## 2022-02-06 RX ADMIN — IPRATROPIUM BROMIDE AND ALBUTEROL SULFATE 1 AMPULE: 2.5; .5 SOLUTION RESPIRATORY (INHALATION) at 20:31

## 2022-02-06 RX ADMIN — CALCIUM CARBONATE-VITAMIN D TAB 500 MG-200 UNIT 1 TABLET: 500-200 TAB at 16:58

## 2022-02-06 RX ADMIN — CEFTRIAXONE 1000 MG: 1 INJECTION, POWDER, FOR SOLUTION INTRAMUSCULAR; INTRAVENOUS at 16:59

## 2022-02-06 RX ADMIN — PANTOPRAZOLE SODIUM 40 MG: 40 TABLET, DELAYED RELEASE ORAL at 08:37

## 2022-02-06 RX ADMIN — Medication 2000 UNITS: at 08:37

## 2022-02-06 RX ADMIN — PREGABALIN 300 MG: 75 CAPSULE ORAL at 08:37

## 2022-02-06 RX ADMIN — LEVOTHYROXINE SODIUM 150 MCG: 150 TABLET ORAL at 08:37

## 2022-02-06 RX ADMIN — CALCIUM CARBONATE-VITAMIN D TAB 500 MG-200 UNIT 1 TABLET: 500-200 TAB at 08:37

## 2022-02-06 RX ADMIN — PREGABALIN 300 MG: 75 CAPSULE ORAL at 01:10

## 2022-02-06 RX ADMIN — ASPIRIN 81 MG: 81 TABLET, CHEWABLE ORAL at 08:37

## 2022-02-06 RX ADMIN — IPRATROPIUM BROMIDE AND ALBUTEROL SULFATE 1 AMPULE: 2.5; .5 SOLUTION RESPIRATORY (INHALATION) at 16:35

## 2022-02-06 RX ADMIN — RIVAROXABAN 20 MG: 20 TABLET, FILM COATED ORAL at 08:36

## 2022-02-06 RX ADMIN — SODIUM CHLORIDE, PRESERVATIVE FREE 5 ML: 5 INJECTION INTRAVENOUS at 01:16

## 2022-02-06 ASSESSMENT — PAIN DESCRIPTION - ONSET
ONSET: ON-GOING

## 2022-02-06 ASSESSMENT — PAIN SCALES - GENERAL
PAINLEVEL_OUTOF10: 7
PAINLEVEL_OUTOF10: 6
PAINLEVEL_OUTOF10: 7

## 2022-02-06 ASSESSMENT — PAIN DESCRIPTION - PROGRESSION
CLINICAL_PROGRESSION: NOT CHANGED

## 2022-02-06 ASSESSMENT — ENCOUNTER SYMPTOMS
COUGH: 1
SHORTNESS OF BREATH: 1
WHEEZING: 1
ABDOMINAL PAIN: 0
ABDOMINAL DISTENTION: 0

## 2022-02-06 ASSESSMENT — PAIN DESCRIPTION - PAIN TYPE
TYPE: CHRONIC PAIN

## 2022-02-06 ASSESSMENT — PAIN DESCRIPTION - FREQUENCY
FREQUENCY: CONTINUOUS

## 2022-02-06 ASSESSMENT — PAIN DESCRIPTION - ORIENTATION
ORIENTATION: LOWER

## 2022-02-06 ASSESSMENT — PAIN DESCRIPTION - DESCRIPTORS
DESCRIPTORS: ACHING

## 2022-02-06 ASSESSMENT — PAIN - FUNCTIONAL ASSESSMENT
PAIN_FUNCTIONAL_ASSESSMENT: PREVENTS OR INTERFERES SOME ACTIVE ACTIVITIES AND ADLS

## 2022-02-06 ASSESSMENT — PAIN DESCRIPTION - LOCATION
LOCATION: BACK

## 2022-02-06 NOTE — RT PROTOCOL NOTE
RESPIRATORY ASSESSMENT PROTOCOL                                                                                              Patient Name: Sugar Núñez Room#: L643/R573-30 : 1945     Admitting diagnosis: Atypical pneumonia [J18.9]  Multifocal pneumonia [J18.9]       Medical History:   Past Medical History:   Diagnosis Date    Chronic pain syndrome     dilaudid infusion pump    COPD (chronic obstructive pulmonary disease) ([facility] [address]     Depression     GERD (gastroesophageal reflux disease)     Hypothyroidism     Osteoporosis        PATIENT ASSESSMENT    LABORATORY DATA  Hematology:   Lab Results   Component Value Date    WBC 14.6 2022    RBC 4.02 2022    HGB 11.9 2022    HCT 37.6 2022     2022     Chemistry:    Lab Results   Component Value Date    PHART 7.383 01/15/2022    ICJ4LEA 45.2 01/15/2022    PO2ART 66.5 01/15/2022    S8IRVIEU 92.8 01/15/2022    ZNR1MGK 26.3 01/15/2022    PBEA 0.8 01/15/2022       VITALS  Pulse: 62   Resp: 15  BP: (!) 96/44  SpO2: 96 % O2 Device: PAP (positive airway pressure)  Temp: 96.7 °F (35.9 °C)    SKIN COLOR  [x] Normal  [] Pale  [] Dusky  [] Cyanotic    RESPIRATORY PATTERN  [x] Normal  [] Dyspnea  [] Cheyne-Granger  [] Kussmaul  [] Biots    AMBULATORY  [] Yes  [] No  [x] With Assistance      Patient Acuity 0 1 2 3 4 Score   Level of Concious (LOC) []  Alert & Oriented or Pt normal LOC [x]  Confused;follows directions []  Confused & uncooper-ative []  Obtunded []  Comatose 1   Respiratory Rate  (RR) [x]  Reg. rate & pattern. 12 - 20 bpm  []  Increased RR. Greater than 20 bpm   []  SOB w/ exertion or RR greater than 24 bpm []  Access- ory muscle use at rest. Abn.  resp. []  SOB at rest.   0   Bilateral Breath Sounds (BBS) []  Clear []  Diminish-ed bases  []  Diminish-ed t/o, or rales   [x]  Sporadic, scattered wheezes or rhonchi []  Persistentwheezes and, or absent BBS 3   Cough [x]  Strong, effective, & non-prod. []  Effective & prod. Less than 25 ml (2 TBSP) over past 24 hrs []  Ineffective & non-prod to less than 25 ML over past 24 hrs []  Ineffective and, or greater than 25 ml sputum prod. past 24 hrs. []  Nonspon- taneous; Requires suctioning 0   Pulmonary History  (PULM HX) []  No smoking and no chronic pulmonaryhistory []  Former smoker. Quit over 12 mos. ago []  Current smoker or quit w/ in 12 mos []  Pulm. History and, or 20 pk/yr smoking hx [x]  Admitted w/ acute pulm. dx and, or has been admitted w/ pulm. dx 2 or more times over past 12 mos 4   Surgical History this Admit  (SURG HX) [x]  No surgery []  General surgery []  Lower abdominal []  Thoracic or upper abdominal   []  Thoracic w/ pulm. disease 0   Chest X-Ray (CXR)/CT Scan []  Clear or not applicable []  Not available []  Atelect- asis or pleural effusions [x]  Localized infiltrate or pulm. edema []  Con-solidated Infiltrates, bilateral, or in more than 1 lobe 3   Slow or Forced VC, FEV1 OR PEFR (PULM FXN)  [x]  80% or greater, or not indicated []  Pt. unable to perform []  FEV1 or PEFR or VC 51-79%. []  FEV1 or PEFR or VC  30-49%   []  FEV1 or PEFR or VC less than 30%   0   TOTAL ACUITY: 11       CARE PLAN    If Acuity Level is 2, 3, or 4 in any of the following:    [x] BILATERAL BREATH SOUNDS (BBS)     [x] PULMONARY HISTORY (PULM HX)  [] PULMONARY FUNCTION (PULM FX)    Goal: Improve respiratory functions in patients with airway disease and decrease WOB    [x] AEROSOL PROTOCOL    Total Acuity:   16-32  []  Secondary Assessment in 24 hrs Total Acuity:  9-15  [x]  Secondary Assessment in 24 hrs Total Acuity:  4-8  []  Secondary Assessment in 48 hrs Total Acuity:  0-3  []  Secondary Assessment in 72 hrs   HHN AEROSOL THERAPY with  [physician-ordered bronchodilator(s)] q 4 & Albuterol PRN q2 hrs. Breath-Actuated Neb if BBS Acuity = 4, and pt. can use MP. Notify physician if condition deteriorates.   HHN AEROSOL THERAPY with  [physician-ordered bronchodilator(s)]  QID and Albuterol PRN q4 hrs. Breath-Actuated Neb if BBS Acuity = 4, and pt. can use MP. Notify physician if condition deteriorates. MDI THERAPY with  2 actuations of [physician-ordered bronchodilator(s)] via spacer TID Albuterol and PRNq4 hrs. If unable to utilize MDI: HHN [physician-ordered bronchodilator(s)] TID and Albuterol PRN q4 hrs. Notify physician if condition deteriorates. MDI THERAPY with  [physician-ordered bronchodilator(s)] via spacer TID PRN. If unable to utilize MDI: HHN [physician-ordered bronchodilator(s)] TID PRN. Notify physician if condition deteriorates. If Acuity Level is 2, 3, or 4 in any of the following:    [] COUGH     [] SURGICAL HISTORY (SURG HX)  [x] CHEST XRAY (CXR)    Goal: Improvement in sputum mobilization in patients with ineffective airway clearance. Reverse atelectasis. [x] Bronchopulmonary Hygiene Protocol    Total Acuity:   16-32  []  Secondary Assessment in 24 hrs Total Acuity:  9-15  [x]  Secondary Assessment in 24 hrs Total Acuity:  4-8  []  Secondary Assessment in 48 hrs Total Acuity:  0-3  []  Secondary Assessment in 72 hrs   METANEB QID with [physician-ordered bronchodilator(s)] if CXR Acuity = 4; otherwise:  PD&P, PEP, or Vest QID & PRN  NT Sxn PRN for ineffective cough  METANEB QID with [physician-ordered bronchodilator(s)] if CXR Acuity = 4; otherwise:  PD&P, PEP, or Vest TID & PRN  NT Sxn PRN for ineffective cough  Instruct patient to self-perform IS q1hr WA   Directed Cough self-performed q1hr WA     If Acuity Level is 2 or above in the following:    [] PULMONARY HISTORY (PULM HX)    Goal: Assist patient in quitting smoking to slow or stop the progression of lung disease.     [] Smoking Cessation Protocol    SMOKING CESSATION EDUCATION provided according to policy [id number]_081: (marlyn with an X)  ____Yes    ____ No     ____ NA    Smoking Cessation Booklet given:  ____Yes  ____No ____Patient Sonal Trejo

## 2022-02-06 NOTE — H&P
Maureen Brownlee M.D. Internal Medicine ICU Admission Note    Patient: Renato Ordoñez  Date of Admission: 2/5/2022 10:49 AM  Date of Evaluation: 2/6/2022        Subjective:      Chief Complaint:    Chief Complaint   Patient presents with    Shortness of Breath     Onset yesterday. Pt has been treated for pneumonia frequently. Pt reports she finished a round of anitibiotic       History Obtained From:  patient, electronic medical record  PCP: Benjamin Crandall MD    History of Present Illness:   Renato Ordoñez is a 77 y.o. female who presented to the ER yesterday complaining of worsening SOB. She has had a complicated history over the previous 13 months. She was admitted for Covid-19 in 12/2020 and intubated from 12/26/2020 to 1/5/2021. Since then she has had multiple admissions for pneumonia in 5/21, 10/21 and most recently from 1/15/22 to 1/18/22. She tested negative for Covid each time and had a viral respiratory panel which was also negative on 1/16/22. She was [address] home from her most recent admission on 1/18/22 with steroid taper and Augmentin and initially did well at home. However, symptoms returned a few days ago. Currently, she complains of  worsening SOB and low grade fever with temp around 99 and bilateral leg aches. In ER she was hypoxic and exhibiting respiratory distress. Imaging studies were suspicious for Covd vs atypical pneumonia, but rapid Covid was negative. A repeat viral respiratory panel was ordered in the ER and is still pending. She was admitted to the ICU for acute respiratory failure with hypoxia due to atypical, multifocal pneumonia. She was placed on BiPAP for respiratory distress and SpO2 83% and tolerated that tx overnight. This morning she was switched to nasal cannula O2 for breakfast and is maintaining an SpO2 between 87-94%. Review of Systems:  Constitutional:positive  for fevers, and negative for chills.   Respiratory: positive for shortness of breath, positive for cough, and negative for wheezing  Cardiovascular: negative for chest pain, negative for palpitations, and negative for syncope  Gastrointestinal: negative for abdominal pain, negative for nausea,negative for vomiting, negative for diarrhea, negative for constipation, and negative for hematochezia or melena  Genitourinary: negative for dysuria, negative for urinary urgency, negative for urinary frequency, and negative for hematuria  Neurological: negative for unilateral weakness, numbness or tingling. All other systems were reviewed with the patient and are negative except as stated      History:      Past Medical History:        Diagnosis Date    Chronic pain syndrome     dilaudid infusion pump    COPD (chronic obstructive pulmonary disease) (HCC)     Depression     GERD (gastroesophageal reflux disease)     Hypothyroidism     Osteoporosis        Past Surgical History:        Procedure Laterality Date    BACK SURGERY      BREAST SURGERY      CARPAL TUNNEL RELEASE      FRACTURE SURGERY Left 12/20/2018    ORIF wrist    HYSTERECTOMY      JOINT REPLACEMENT      right knee    JOINT REPLACEMENT      WRIST FRACTURE SURGERY Left 12/20/2018    WRIST OPEN REDUCTION INTERNAL FIXATION-DISTAL RADIUS performed by Marco A Barba MD at [address]       Medications Prior to Admission:    Prior to Admission medications    Medication Sig Start Date End Date Taking? Authorizing Provider   dextrose 5 % SOLN with HYDROmorphone HCl PF 10 MG/ML SOLN 1 mg/mL Infuse intravenously continuous Pt has continuous dilaudid infusion pump implanted, but is unsure of dosage.  2.797 mg/day   Yes Historical Provider, MD   furosemide (LASIX) 20 MG tablet Take 1 tablet by mouth daily  Patient taking differently: Take 20 mg by mouth daily PRN 5/14/21  Yes Geoffrey Roberts MD   rivaroxaban (XARELTO) 20 MG TABS tablet Take 1 tablet by mouth daily (with breakfast) 4/22/21  Yes Geoffrey Roberts MD   HYDROcodone-acetaminophen [facility] AND [facility]  MG per tablet Take 1 tablet by mouth every 6 hours as needed for Pain. Yes Historical Provider, MD   Hydroxychloroquine Sulfate (PLAQUENIL PO) Take 1 tablet by mouth daily    Yes Historical Provider, MD   albuterol sulfate  (90 Base) MCG/ACT inhaler Inhale 2 puffs into the lungs 4 times daily 10/8/18  Yes Ba Santos PA-C   levothyroxine (SYNTHROID) 150 MCG tablet Take 150 mcg by mouth Daily   Yes Historical Provider, MD   aspirin 81 MG tablet Take 81 mg by mouth daily   Yes Historical Provider, MD   DULoxetine (CYMBALTA) 60 MG extended release capsule Take 60 mg by mouth daily   Yes Historical Provider, MD   traZODone (DESYREL) 100 MG tablet Take 200 mg by mouth nightly    Yes Historical Provider, MD   pantoprazole sodium (PROTONIX) 40 MG PACK packet Take 40 mg by mouth 2 times daily (before meals)   Yes Historical Provider, MD   potassium chloride (KLOR-CON) 20 MEQ packet Take 20 mEq by mouth 2 times daily   Yes Historical Provider, MD   calcium citrate-vitamin D (CITRICAL + D) 315-250 MG-UNIT TABS per tablet Take 1 tablet by mouth 2 times daily (with meals)   Yes Historical Provider, MD   alendronate (FOSAMAX) 70 MG tablet Take 70 mg by mouth every 7 days On Sundays   Yes Historical Provider, MD   ascorbic acid (VITAMIN C) 1000 MG tablet Take 1 tablet by mouth 2 times daily for 7 days 12/29/20 1/16/22  RYAN Fang CNP   Vitamin D (CHOLECALCIFEROL) 50 MCG (2000 UT) TABS tablet Take 1 tablet by mouth daily for 7 days 12/30/20 1/16/22  RYAN Fang CNP   pregabalin (LYRICA) 300 MG capsule Take 1 capsule by mouth 2 times daily for 30 days. 11/6/20 5/20/21  Mike Henriquez MD       Allergies:  Patient has no known allergies. Social History:   TOBACCO:   reports that she quit smoking about 45 years ago. She has a 10.00 pack-year smoking history. She has never used smokeless tobacco.  ETOH:   reports no history of alcohol use.     Family History:       Problem Relation Age of Onset    Heart Attack Father 61    Heart Attack Sister     Heart Disease Brother            Objective:    VITAL SIGNS:  Temp: 96.7 °F (35.9 °C)  Temp range:    Temp  Av.5 °F (36.4 °C)  Min: 96.4 °F (35.8 °C)  Max: 99 °F (37.2 °C)    BP: (!) 128/46  BP Range:      Systolic (38DPK), AMX:929 , Min:75 , PQN:385      Diastolic (68QET), UCN:55, Min:40, Max:103    Pulse: 89  Pulse Range:    Pulse  Av.7  Min: 57  Max: 120    Resp: 19  Resp Range:   Resp  Av.7  Min: 11  Max: 24    SpO2: 95 % on supplemental O2  SpO2 range:   SpO2  Av.7 %  Min: 83 %  Max: 100 %    Weight  Wt Readings from Last 3 Encounters:   22 164 lb 3 oz (74.5 kg)   22 155 lb (70.3 kg)   22 146 lb (66.2 kg)     Body mass index is 28.18 kg/m².    -----------------------------------------------------------------  EXAM:  GEN:    Awake, alert and oriented x3. EYES:  EOMI, pupils equal   NECK: Supple. No lymphadenopathy. No carotid bruit  CVS:    regular rate and rhythm, no audible murmur  PULM:  diminished with bilateral scattered rhonchi, no acute respiratory distress currently  ABD:    Bowels sounds normal.  Abdomen is soft. No distention. no tenderness to palpation. EXT:   no edema bilaterally . No calf tenderness. NEURO: Moves all extremities. Motor and sensory are grossly intact  SKIN:  No rashes. No skin lesions.         Diagnostic Data:    Complete Blood Count:   Lab Results   Component Value Date    WBC 14.6 (H) 2022    RBC 4.02 2022    HGB 11.9 2022    HCT 37.6 2022    MCV 93.5 2022    RDW 15.6 (H) 2022     2022      Lab Results   Component Value Date    SEGS 92 (H) 2022    NEUTROABS 13.43 (H) 2022    LYMPHOPCT 6 (L) 2022    LYMPHSABS 0.88 (L) 2022    MONOPCT 2 (L) 2022    EOSRELPCT 0 (L) 2022    BASOPCT 0 2022    IMMGRAN 0 2022       CMP:   Lab Results   Component Value Date    GLUCOSE 125 (H) 02/06/2022    BUN 23 02/06/2022    CREATININE 0.55 02/06/2022     02/06/2022    K 4.5 02/06/2022    CALCIUM 9.2 02/06/2022     02/06/2022    CO2 28 02/06/2022    PROT 6.2 (L) 02/05/2022    LABALBU 3.9 02/05/2022    BILITOT 0.52 02/05/2022    ALKPHOS 53 02/05/2022    ALT 15 02/05/2022    AST 22 02/05/2022         Lactic Acid:    2/5/2022 11:05 2/5/2022 15:30   Lactic Acid 3.2 (H) 2.5 (H)       D-Dimer:  Lab Results   Component Value Date    DDIMER 0.69 (H) 01/18/2022     PT/INR:  Lab Results   Component Value Date    PROTIME 15.6 10/14/2021    INR 1.3 10/14/2021     High Sensitivity Troponin:  Recent Labs     02/05/22  1105 02/05/22  1210   TROPHS 105* 108*     Pro-BNP:  Lab Results   Component Value Date    PROBNP 168 02/05/2022       EKG reviewed: my interpretation is: sinus tachycardia with left axis deviation and non-specific ST changes        Imaging Data:  CT CHEST PULMONARY EMBOLISM W CONTRAST   Final Result   No evidence of pulmonary embolism. Similar extensive persistent pulmonary abnormalities/bronchitis compatible   with the history of COVID pneumonia. Some improvement noted left base but   worsening seen on the right, mostly RLL; consider increasing viral or   possibly superimposed bacterial or other bronchopneumonia with some   consolidation. Large hiatus hernia. Similar mild mediastinal adenopathy. Additional unchanged findings, as above. RECOMMENDATIONS:   Unavailable         XR CHEST PORTABLE   Final Result   Persistent opacities most significant in the left mid lung and concerning for   unchanged multifocal pneumonia. Clinical and imaging follow-up to resolution   recommended. Assessment and Plan:      This patient requires inpatient ICU admission because of Sepsis and acute respiratory failure due to atypical pneumonia  · Estimated length of stay is 4 days  · Discussed patient's symptoms and data results including labs and imaging studies with the ER MD at time of admission  · Repeat viral respiratory panel ordered (previously negative 1/17/22)  · Rocephin / Azithromycin  · BiPAP / supplemental O2 for respiratory support  · Received IVF bolus 2 L in ER   · Check ABG  · Pulmonology consult requested due to h/o admission for CAP x 3 since pt was intubated 12/2020 for Covid-19  · Trend lactic acid  · Elevated troponin  · Cardiology consult  · Trend labs  · Continue ASA  · Mild CARLITA on admission  · Resolved with IVFs  · COPD  · Continue nebs  · Chronic pain syndrome  · Pt has implantable dilaudid infusion pump  · Continue Lyrica, PRN Norco  · Nutrition status: at risk for malnutrition: dietary consult initiated during hositalization  · DVT prophylaxis: Xarelto  · High risk medications: none   · Total critical care time caring for this patient with life threatening, unstable organ failure, including direct patient contact, management of life support systems, review of data including imaging and labs, discussions with other team members and physicians at least 40 minutes so far today, excluding separately billable procedures. CORE MEASURES  · DVT prophylaxis: Xarelto  · Decubitus ulcer present on admission: No  · CODE STATUS: FULL CODE  · Nutrition Status: fair   · Physical therapy: Yes   · Old Charts reviewed: Yes   · EKG Reviewed:  Yes   · Advance Directive Addressed: Yes - in chart    Eduardo Abreu MD , M.D.  2/6/2022  9:47 AM

## 2022-02-06 NOTE — PROGRESS NOTES
Lab coming up to draw troponin, will call cardiology consult to Dr Tiffanie Eugene after lab is resulted.

## 2022-02-06 NOTE — CONSULTS
2022    TELEHEALTH EVALUATION -- Audio/Visual (During THYKH-60 public health emergency)    Patient and physician are located in their individual locations. This is visit is completed via [employer] application / [email or web address] / Telephone     Chief Complaint   Patient presents with    Shortness of Breath     Onset yesterday. Pt has been treated for pneumonia frequently. Pt reports she finished a round of anitibiotic        HPI:    Edyta Ferguson 77 y.o. female  (:  1945) has requested an audio/video evaluation for the following concern(s):    Patient was admitted to hospital in 2020 because of COVID-19 pneumonia. She was subsequently discharged but has been having recurrent admissions because of pneumonia. Repeat COVID-19 testing has been negative. She complains of coughing yellow sputum. Has shortness of breath, is requiring supplemental oxygen while in the hospital and was needing BiPAP overnight. Patient tells me that she has history of rheumatoid arthritis which was diagnosed about a year ago but her rheumatoid factor is negative in 2020 she is on Plaquenil for this rheumatoid arthritis which according to her was started 1 year ago. She does complain of GERD and regurgitation of food which can happen during the day or night. Her scan showed that she has a large hiatal hernia. She smoked 1 pack/day for 20 years but quit 48 years ago, occupation was babysitting. Review of Systems   Constitutional: Positive for fatigue and fever. HENT: Negative. Respiratory: Positive for cough, shortness of breath and wheezing. Cardiovascular: Negative. Gastrointestinal: Negative for abdominal distention and abdominal pain. Genitourinary: Negative. Neurological: Negative.                 Immunization   Immunization History   Administered Date(s) Administered    COVID-19, Connie Morrissey, Primary or Immunocompromised, PF, 100mcg/0.5mL 2021, 2021, 10/25/2021    Influenza, High Dose (Fluzone 65 yrs and older) 10/04/2018    Pneumococcal Conjugate 13-valent ([id number]) 2018    Pneumococcal Polysaccharide ([id number]) 2014          PAST MEDICAL HISTORY:       Diagnosis Date    Chronic pain syndrome     dilaudid infusion pump    COPD (chronic obstructive pulmonary disease) (HCC)     Depression     GERD (gastroesophageal reflux disease)     Hypothyroidism     Osteoporosis          Family History:       Problem Relation Age of Onset    Heart Attack Father 61    Heart Attack Sister     Heart Disease Brother        SURGICAL HISTORY:   Past Surgical History:   Procedure Laterality Date    BACK SURGERY      BREAST SURGERY      CARPAL TUNNEL RELEASE      FRACTURE SURGERY Left 2018    ORIF wrist    HYSTERECTOMY      JOINT REPLACEMENT      right knee    JOINT REPLACEMENT      WRIST FRACTURE SURGERY Left 2018    WRIST OPEN REDUCTION INTERNAL FIXATION-DISTAL RADIUS performed by Jaylen Pineda MD at [address]:       Social History     Socioeconomic History    Marital status:      Spouse name: None    Number of children: None    Years of education: None    Highest education level: None   Occupational History    None   Tobacco Use    Smoking status: Former Smoker     Packs/day: 1.00     Years: 10.00     Pack years: 10.00     Quit date: 1976     Years since quittin.2    Smokeless tobacco: Never Used   Vaping Use    Vaping Use: Never used   Substance and Sexual Activity    Alcohol use: No    Drug use: No    Sexual activity: None   Other Topics Concern    None   Social History Narrative    None     Social Determinants of Health     Financial Resource Strain:     Difficulty of Paying Living Expenses: Not on file   Food Insecurity:     Worried About Running Out of Food in the Last Year: Not on file    Concepcion of Food in the Last Year: Not on file   Transportation Needs:     Lack of Transportation (Medical): Not on file    Lack of Transportation (Non-Medical): Not on file   Physical Activity:     Days of Exercise per Week: Not on file    Minutes of Exercise per Session: Not on file   Stress:     Feeling of Stress : Not on file   Social Connections:     Frequency of Communication with Friends and Family: Not on file    Frequency of Social Gatherings with Friends and Family: Not on file    Attends Buddhist Services: Not on file    Active Member of [address] [place] [address] or Organizations: Not on file    Attends Club or Organization Meetings: Not on file    Marital Status: Not on file   Intimate Partner Violence:     Fear of Current or Ex-Partner: Not on file    Emotionally Abused: Not on file    Physically Abused: Not on file    Sexually Abused: Not on file   Housing Stability:     Unable to Pay for Housing in the Last Year: Not on file    Number of [place] in the Last Year: Not on file    Unstable Housing in the Last Year: Not on file        TOBACCO:   reports that she quit smoking about 45 years ago. She has a 10.00 pack-year smoking history. She has never used smokeless tobacco.  ETOH:   reports no history of alcohol use. ALLERGIES:      No Known Allergies      Home Meds:   Prior to Admission medications    Medication Sig Start Date End Date Taking? Authorizing Provider   dextrose 5 % SOLN with HYDROmorphone HCl PF 10 MG/ML SOLN 1 mg/mL Infuse intravenously continuous Pt has continuous dilaudid infusion pump implanted, but is unsure of dosage. 2.797 mg/day   Yes Historical Provider, MD   furosemide (LASIX) 20 MG tablet Take 1 tablet by mouth daily  Patient taking differently: Take 20 mg by mouth daily PRN 5/14/21  Yes Domenica Michel MD   rivaroxaban (XARELTO) 20 MG TABS tablet Take 1 tablet by mouth daily (with breakfast) 4/22/21  Yes Domenica Michel MD   HYDROcodone-acetaminophen [facility])  MG per tablet Take 1 tablet by mouth every 6 hours as needed for Pain.    Yes Historical Rapid    Specimen: Nasopharyngeal Swab   Result Value Ref Range    Specimen Description . NASOPHARYNGEAL SWAB     SARS-CoV-2, Rapid Not Detected Not Detected   Respiratory Panel, Molecular, with COVID-19 (Restricted: peds pts or suitable admitted adults)    Specimen: Nasopharyngeal Swab   Result Value Ref Range    Specimen Description . NASOPHARYNGEAL SWAB     Adenovirus PCR Not Detected Not Detected    Coronavirus 229E PCR Not Detected Not Detected    Coronavirus HKU1 PCR Not Detected Not Detected    Coronavirus NL63 PCR Not Detected Not Detected    Coronavirus OC43 PCR Not Detected Not Detected    SARS-CoV-2, PCR Not Detected Not Detected    Human Metapneumovirus PCR Not Detected Not Detected    Rhino/Enterovirus PCR Not Detected Not Detected    Influenza A by PCR Not Detected Not Detected    Influenza A H1 PCR NOT REPORTED Not Detected    Influenza A H1 (2009) PCR NOT REPORTED Not Detected    Influenza A H3 PCR NOT REPORTED Not Detected    Influenza B by PCR Not Detected Not Detected    Parainfluenza 1 PCR Not Detected Not Detected    Parainfluenza 2 PCR Not Detected Not Detected    Parainfluenza 3 PCR Not Detected Not Detected    Parainfluenza 4 PCR Not Detected Not Detected    Resp Syncytial Virus PCR Not Detected Not Detected    Bordetella Parapertussis Not Detected Not Detected    B Pertussis by PCR Not Detected Not Detected    Chlamydia pneumoniae By PCR Not Detected Not Detected    Mycoplasma pneumo by PCR Not Detected Not Detected   CBC Auto Differential   Result Value Ref Range    WBC 10.4 3.5 - 11.3 k/uL    RBC 4.75 3.95 - 5.11 m/uL    Hemoglobin 13.8 11.9 - 15.1 g/dL    Hematocrit 44.6 36.3 - 47.1 %    MCV 93.9 82.6 - 102.9 fL    MCH 29.1 25.2 - 33.5 pg    MCHC 30.9 28.4 - 34.8 g/dL    RDW 15.2 (H) 11.8 - 14.4 %    Platelets 484 935 - 000 k/uL    MPV 9.4 8.1 - 13.5 fL    NRBC Automated 0.0 0.0 per 100 WBC    Differential Type NOT REPORTED     Seg Neutrophils 79 (H) 36 - 65 %    Lymphocytes 13 (L) 24 - 43 %    Monocytes 7 3 - 12 %    Eosinophils % 1 1 - 4 %    Basophils 0 0 - 2 %    Immature Granulocytes 0 0 %    Segs Absolute 8.18 (H) 1.50 - 8.10 k/uL    Absolute Lymph # 1.32 1.10 - 3.70 k/uL    Absolute Mono # 0.75 0.10 - 1.20 k/uL    Absolute Eos # 0.08 0.00 - 0.44 k/uL    Basophils Absolute <0.03 0.00 - 0.20 k/uL    Absolute Immature Granulocyte 0.03 0.00 - 0.30 k/uL    WBC Morphology NOT REPORTED     RBC Morphology NOT REPORTED     Platelet Estimate NOT REPORTED    Comprehensive Metabolic Panel w/ Reflex to MG   Result Value Ref Range    Glucose 90 70 - 99 mg/dL    BUN 25 (H) 8 - 23 mg/dL    CREATININE 1.05 (H) 0.50 - 0.90 mg/dL    Bun/Cre Ratio 24 (H) 9 - 20    Calcium 9.3 8.6 - 10.4 mg/dL    Sodium 140 135 - 144 mmol/L    Potassium 3.0 (L) 3.7 - 5.3 mmol/L    Chloride 98 98 - 107 mmol/L    CO2 33 (H) 20 - 31 mmol/L    Anion Gap 9 9 - 17 mmol/L    Alkaline Phosphatase 53 35 - 104 U/L    ALT 15 5 - 33 U/L    AST 22 <32 U/L    Total Bilirubin 0.52 0.3 - 1.2 mg/dL    Total Protein 6.2 (L) 6.4 - 8.3 g/dL    Albumin 3.9 3.5 - 5.2 g/dL    Albumin/Globulin Ratio 1.7 1.0 - 2.5    GFR Non- 51 (L) >60 mL/min    GFR African American >60 >60 mL/min    GFR Comment          GFR Staging         Lactic acid, plasma   Result Value Ref Range    Lactic Acid 3.2 (H) 0.5 - 2.2 mmol/L    Lactic Acid, Whole Blood NOT REPORTED 0.7 - 2.1 mmol/L   Brain Natriuretic Peptide   Result Value Ref Range    Pro- <300 pg/mL    BNP Interpretation NOT REPORTED    Troponin   Result Value Ref Range    Troponin, High Sensitivity 105 (HH) 0 - 14 ng/L    Troponin T NOT REPORTED <0.03 ng/mL    Troponin Interp NOT REPORTED    Magnesium   Result Value Ref Range    Magnesium 1.8 1.6 - 2.6 mg/dL   Troponin   Result Value Ref Range    Troponin, High Sensitivity 108 (HH) 0 - 14 ng/L    Troponin T NOT REPORTED <0.03 ng/mL    Troponin Interp NOT REPORTED    Lactic acid, plasma   Result Value Ref Range    Lactic Acid 2.5 (H) 0.5 - 2.2 mmol/L    Lactic Acid, Whole Blood NOT REPORTED 0.7 - 2.1 mmol/L   Basic Metabolic Panel w/ Reflex to MG   Result Value Ref Range    Glucose 125 (H) 70 - 99 mg/dL    BUN 23 8 - 23 mg/dL    CREATININE 0.55 0.50 - 0.90 mg/dL    Bun/Cre Ratio 42 (H) 9 - 20    Calcium 9.2 8.6 - 10.4 mg/dL    Sodium 135 135 - 144 mmol/L    Potassium 4.5 3.7 - 5.3 mmol/L    Chloride 101 98 - 107 mmol/L    CO2 28 20 - 31 mmol/L    Anion Gap 6 (L) 9 - 17 mmol/L    GFR Non-African American >60 >60 mL/min    GFR African American >60 >60 mL/min    GFR Comment          GFR Staging         CBC auto differential   Result Value Ref Range    WBC 14.6 (H) 3.5 - 11.3 k/uL    RBC 4.02 3.95 - 5.11 m/uL    Hemoglobin 11.9 11.9 - 15.1 g/dL    Hematocrit 37.6 36.3 - 47.1 %    MCV 93.5 82.6 - 102.9 fL    MCH 29.6 25.2 - 33.5 pg    MCHC 31.6 28.4 - 34.8 g/dL    RDW 15.6 (H) 11.8 - 14.4 %    Platelets 071 532 - 227 k/uL    MPV 9.5 8.1 - 13.5 fL    NRBC Automated 0.0 0.0 per 100 WBC    Differential Type NOT REPORTED     WBC Morphology NOT REPORTED     RBC Morphology NOT REPORTED     Platelet Estimate NOT REPORTED     Seg Neutrophils 92 (H) 36 - 65 %    Lymphocytes 6 (L) 24 - 43 %    Monocytes 2 (L) 3 - 12 %    Eosinophils % 0 (L) 1 - 4 %    Immature Granulocytes 0 0 %    Basophils 0 0 - 2 %    Segs Absolute 13.43 (H) 1.50 - 8.10 k/uL    Absolute Lymph # 0.88 (L) 1.10 - 3.70 k/uL    Absolute Mono # 0.29 0.10 - 1.20 k/uL    Absolute Eos # 0.00 0.00 - 0.44 k/uL    Absolute Immature Granulocyte 0.00 0.00 - 0.30 k/uL    Basophils Absolute 0.00 0.0 - 0.2 k/uL    Morphology Normal    Blood Gas, Arterial   Result Value Ref Range    pH, Arterial 7.398 7.35 - 7.45    pCO2, Arterial 54.3 (H) 35 - 45 mmHg    pO2, Arterial 75.9 (L) 80 - 100 mmHg    HCO3, Arterial 32.7 (H) 22 - 26 mmol/L    Positive Base Excess, Art 6.2 (H) 0.0 - 2.0 mmol/L    Negative Base Excess, Art NOT REPORTED 0.0 - 2.0 mmol/L    O2 Sat, Arterial 95.0 95 - 98 %    Total Hb NOT REPORTED 12.0 - 16.0 Interpreting Physician      Jose Miner   Fellow                   Referring Nurse                           Practitioner   Interpreting             Referring Physician         Jose Miner  Fellow  Type of Study   TTE procedure:2D Echocardiogram, M-Mode, Doppler, Color Doppler. Procedure Date Date: 02/01/2022 Start: 03:01 PM Study Location: [facility] Indications:Congestive heart failure, combined systolic and diastolic dysfunction. History / Tech. Comments: Dx: chronic combined diastolic and systolic CHF, SOB Hx: AFIB, COPD Patient Status: Outpatient Height: 64 inches Weight: 155 pounds BSA: 1.76 m^2 BMI: 26.61 kg/m^2 BP: 131/70 mmHg Allergies   - *No Known Allergies. CONCLUSIONS Summary Global left ventricular systolic function appears preserved with an estimated ejection fraction of 50-55%. The left ventricular cavity size is within normal limits and the left ventricular wall thickness is mildly increased. The left atrium is severely dilated (>40) with a left atrial volume index of 43 ml/m2. Mild mitral and tricuspid regurgitation. Mild pulmonary hypertension with an estimated right ventricular systolic pressure of 32 mmHg. Evidence of mild (grade I) diastolic dysfunction is seen. Signature ----------------------------------------------------------------------------  Electronically signed by Jessy Mcfarland(Sonographer) on 02/01/2022 03:37 PM ---------------------------------------------------------------------------- ----------------------------------------------------------------------------  Electronically signed by Jose Miner(Interpreting physician) on 02/01/2022  04:56 PM ---------------------------------------------------------------------------- FINDINGS Left Atrium The left atrium is severely dilated (>40) with a left atrial volume index of 43 ml/m2. Left Ventricle Global left ventricular systolic function appears preserved with an estimated ejection fraction of 50-55%.  The left ventricular cavity size is within normal limits and the left ventricular wall thickness is mildly increased. Right Atrium Right atrium is normal in size. Right Ventricle Normal right ventricular size and function. Mitral Valve Thickened mitral valve leaflets. Mild mitral regurgitation. Aortic Valve Normal aortic valve structure and function without stenosis or regurgitation. Tricuspid Valve Normal tricuspid valve structure with mild tricuspid regurgitation. Mild pulmonary hypertension with an estimated right ventricular systolic pressure of 32 mmHg. Pulmonic Valve The pulmonic valve is normal in structure. Pericardial Effusion No significant pericardial effusion is seen. Miscellaneous Evidence of mild (grade I) diastolic dysfunction is seen.  M-mode / 2D Measurements & Calculations:   LVIDd:4.1 cm(3.7 - 5.6 cm)       Diastolic CACNEW:19.85141 ml  LVIDs:3.06 cm(2.2 - 4.0 cm)      Systolic EUOIR.9 ml  TTVI:1.44 cm(0.6 - 1.1 cm)       Aortic Root:2.95 cm(2.0 - 3.7 cm)  LVPWd:1.06 cm(0.6 - 1.1 cm)      LA Dimension: 3.53 cm(1.9 - 4.0 cm)  Fractional Shortenin.37 %    LA volume/Index: 74.9 ml /43m^2  Calculated LVEF (%): 54.34 %     LVOT:2 cm                                   RVDd:2.46 cm   Mitral:                                 Aortic   Valve Area (P1/2-Time): 3.12 cm^2       Peak Velocity: 1.42 m/s  Peak E-Wave: 0.71 m/s                   Mean Velocity: 1.16 m/s  Peak A-Wave: 0.82 m/s                   Peak Gradient: 8.04 mmHg  E/A Ratio: 0.87                         Mean Gradient: 5.64 mmHg  Peak Gradient: 2.02 mmHg                                          Acceleration Time: 49.73 msec  P1/2t: 70.5 msec                        Area (continuity): 3.34 cm^2                                          AV VTI: 32.14 cm   Tricuspid:                              Pulmonic:   Estimated RVSP: 32.38 mmHg  Peak TR Velocity: 2.47 m/s  Peak TR Gradient: 24.96148 mmHg  Estimated RA Pressure: 8 mmHg Estimated PASP: 32.38 mmHg  Diastology / Tissue Doppler Lateral Wall E' velocity:0.08 m/s Lateral Wall E/E':8.77    XR CHEST PORTABLE    Result Date: 2/5/2022  EXAMINATION: ONE XRAY VIEW OF THE CHEST 2/5/2022 11:32 am COMPARISON: Chest x-ray from January 15 2015 HISTORY: ORDERING SYSTEM PROVIDED HISTORY: cough TECHNOLOGIST PROVIDED HISTORY: cough FINDINGS: The cardiomediastinal silhouette is enlarged. No pleural effusion or pneumothorax. Patchy interstitial and alveolar opacities, similar to prior study and most significant in the left mid to lower lung. Spinal stimulator. Persistent opacities most significant in the left mid lung and concerning for unchanged multifocal pneumonia. Clinical and imaging follow-up to resolution recommended. XR CHEST PORTABLE    Result Date: 1/15/2022  EXAMINATION: ONE XRAY VIEW OF THE CHEST 1/15/2022 12:45 pm COMPARISON: Chest x-ray dated 06/01/2021 HISTORY: ORDERING SYSTEM PROVIDED HISTORY: dyspnea TECHNOLOGIST PROVIDED HISTORY: dyspnea FINDINGS: Bilateral interstitial groundglass opacities likely represent viral pneumonia, COVID-19 is not excluded. No pleural effusion or pneumothorax. Cardiac silhouette is unremarkable. Degenerative disease of the visualized osseous structures. Bilateral interstitial groundglass opacities likely represent viral pneumonia, COVID-19 is not excluded. CT CHEST PULMONARY EMBOLISM W CONTRAST    Result Date: 2/5/2022  EXAMINATION: CTA OF THE CHEST 2/5/2022 12:21 pm TECHNIQUE: CTA of the chest was performed after the administration of intravenous contrast.  Multiplanar reformatted images are provided for review. MIP images are provided for review. Dose modulation, iterative reconstruction, and/or weight based adjustment of the mA/kV was utilized to reduce the radiation dose to as low as reasonably achievable.  COMPARISON: CT scan of the chest for PE from 01/15/2022 HISTORY: ORDERING SYSTEM PROVIDED HISTORY: hypoxemia TECHNOLOGIST mediastinal adenopathy. Additional unchanged findings, as above. RECOMMENDATIONS: Unavailable     CT CHEST PULMONARY EMBOLISM W CONTRAST    Result Date: 1/15/2022  EXAMINATION: CTA OF THE CHEST 1/15/2022 11:09 am TECHNIQUE: CTA of the chest was performed after the administration of intravenous contrast.  Multiplanar reformatted images are provided for review. MIP images are provided for review. Dose modulation, iterative reconstruction, and/or weight based adjustment of the mA/kV was utilized to reduce the radiation dose to as low as reasonably achievable. COMPARISON: 05/20/2021 HISTORY: ORDERING SYSTEM PROVIDED HISTORY: hypoxia, cough TECHNOLOGIST PROVIDED HISTORY: hypoxia, cough Decision Support Exception - unselect if not a suspected or confirmed emergency medical condition->Emergency Medical Condition (MA) FINDINGS: Pulmonary Arteries: Pulmonary arteries are less than adequately opacified for evaluation. No obvious evidence of intraluminal filling defect to suggest pulmonary embolism. Main pulmonary artery is normal in caliber. Mediastinum: There are multiple nonspecific lymph nodes seen in the middle mediastinum, subcarinal region and guillermo, possibly reactive Lungs/pleura: Patchy bilateral interstitial, airspace and ground-glass infiltrates most notably in the lung bases. . There is underlying biapical and bibasilar fibrosis. No suspicious pulmonary masses are noted, however the previously described nodule in the right lower lobe is obscured by the infiltrates. .  No pleural effusions are identified. Cardiomediastinal Structures: Coronary arterial calcifications are seen. Intimal calcifications associated with the thoracic aorta. No evidence of thoracic aortic aneurysm or dissection . Cardiac chambers are normal Upper Abdomen: Large hiatal hernia Soft Tissues/Bones: There are hypertrophic degenerative changes in the thoracic spine. No acute osseous abnormalities.   Stable probable sebaceous cyst along the subcutaneous tissues of the right anterior chest wall. Status post left shoulder prosthesis. Cervical fusion plate. Patchy bilateral interstitial, airspace and ground-glass infiltrates most notably in the lung bases compatible with bilateral pneumonia Coronary artery/atherosclerotic disease No obvious evidence of pulmonary embolism, however the segmental and subsegmental branches are not well opacified. Multiple nonspecific lymph nodes seen in the middle mediastinum, subcarinal region and guillermo, possibly reactive and relatively stable from prior exam       PHYSICAL EXAMINATION:  Due to this being a TeleHealth encounter, evaluation of the following organ systems is limited: Vitals/Constitutional/EENT/Resp/CV/GI//MS/Neuro/Skin/Heme-Lymph-Imm. Constitutional: [x] Appears well-developed and well-nourished. [] Abnormal  Mental status  [x] Alert and awake  [x] Oriented to person/place/time [x]Able to follow commands    [x] No apparent distress      Eyes:  EOM    [x]  Normal  [] Abnormal-  Sclera  [x]  Normal  [] Abnormal -         Discharge [x]  None visible  [] Abnormal -    HENT:   [x] Normocephalic, atraumatic. [] Abnormal shaped head   [x] Mouth/Throat: Mucous membranes are moist.     Ears [x] Normal  [] Abnormal-    Neck: [x] Normal range of motion [x] Supple [x] No visualized mass. Pulmonary/Chest: [x] Respiratory effort normal.  [x] No visualized signs of difficulty breathing or respiratory distress        [] Abnormal      Musculoskeletal:   [x] Normal range of motion. [x] Normal gait with no signs of ataxia. [x]  No signs of cyanosis of the peripheral portions of extremities.          [] Abnormal       Neurological:        [x] Normal cranial nerve (limited exam to video visit) [x] No focal weakness observed       [] Abnormal          Speech       [x] Normal   [] Abnormal               ASSESSMENT:  Recurrent pneumonia due to aspiration  Question Boop  Bilateral upper lobe fibrotic Supplemental Appropriations Act, this Virtual  Visit was conducted, with patient's consent, to reduce the patient's risk of exposure to COVID-19 and provide continuity of care for an established patient. Services were provided through a video synchronous discussion virtually to substitute for in-person  visit.

## 2022-02-06 NOTE — PROGRESS NOTES
Physical Therapy    Facility/Department: [facility] AT THE [facility]  Initial Assessment    NAME: Chucky Gupta  : 1945  MRN: [id number]    Date of Service: 2022    Discharge Recommendations:  Continue to assess pending progress        Assessment   Body structures, Functions, Activity limitations: Decreased functional mobility ; Decreased safe awareness;Decreased endurance;Decreased high-level IADLs;Decreased balance;Decreased ADL status; Decreased strength  Assessment: Pt is a 76 y/o female admitted for atypical pneumonia. Pt currently requires increased time and assistance to complete ADLs and functional tasks d/t decline in strength, endurance and functional activity tolerance. Pt will benefit from skilled PT services to address the above impairments and to improve overall functional mobility. Treatment Diagnosis: generalized weakness  Prognosis: Good  Decision Making: Medium Complexity  PT Education: PT Role;Plan of Care  REQUIRES PT FOLLOW UP: Yes  Activity Tolerance  Activity Tolerance: Patient Tolerated treatment well       Patient Diagnosis(es): The encounter diagnosis was Multifocal pneumonia. has a past medical history of Chronic pain syndrome, COPD (chronic obstructive pulmonary disease) ([address] Depression, GERD (gastroesophageal reflux disease), Hypothyroidism, and Osteoporosis. has a past surgical history that includes Hysterectomy; back surgery; Breast surgery; Carpal tunnel release; joint replacement; joint replacement; fracture surgery (Left, 2018); and Wrist fracture surgery (Left, 2018).     Restrictions  Restrictions/Precautions  Restrictions/Precautions: General Precautions,Fall Risk,Contact Precautions,Isolation  Vision/Hearing  Vision: Within Functional Limits  Hearing: Within functional limits     Subjective  General  Patient assessed for rehabilitation services?: Yes  Pain Screening  Patient Currently in Pain: Denies          Orientation  Orientation  Overall Orientation Status: Within Functional Limits  Social/Functional History  Social/Functional History  Lives With: Spouse  Type of Home: House  Home Layout: Two level,Able to Live on Main level with bedroom/bathroom,Performs ADL's on one level  Home Access: Stairs to enter with rails  Entrance Stairs - Number of Steps: 1  Bathroom Shower/Tub: Tub/Shower unit  Bathroom Toilet: Standard  Home Equipment: Rolling walker,Cane (pt reports she typically doesn't use AD)  ADL Assistance: Independent  Homemaking Assistance: Independent  Homemaking Responsibilities: Yes  Ambulation Assistance: Needs assistance  Transfer Assistance: Needs assistance  IADL Comments: Pt states her  does most of the cooking and she helps with the cleaning  Cognition   Cognition  Overall Cognitive Status: WFL    Objective          AROM RLE (degrees)  RLE AROM: WFL  AROM LLE (degrees)  LLE AROM : WFL  Strength RLE  Comment: Grossly 3+ to 4-/5  Strength LLE  Comment: Grossly 3+ to 4-/5        Bed mobility  Bridging:  (Pt seatd in recliner upon arrival to pt's room)  Transfers  Sit to Stand: Stand by assistance;Contact guard assistance  Stand to sit: Stand by assistance;Contact guard assistance  Ambulation  Ambulation?: Yes  Ambulation 1  Surface: level tile  Device: Hand-Held Assist  Assistance: Contact guard assistance  Distance: 3ft  Comments: Pt unsteady on feet. Would benefit from use of RW for further ambulation.   Stairs/Curb  Stairs?: No     Balance  Sitting - Static: Good  Sitting - Dynamic: Good  Standing - Static: Fair;-  Standing - Dynamic: Poor        Plan   Plan  Times per week: 7 days per week  Times per day: Twice a day (Daily on weekends)  Current Treatment Recommendations: Strengthening,Balance Training,Transfer Training,IADL Training,ADL/Self-care Training,Stair training,Endurance Training,Gait Training,Neuromuscular Re-education,Home Exercise Program,Patient/Caregiver Education & Training,Safety Education & Training  Safety Devices  Type of devices: All fall risk precautions in place                                               AM-PAC Score     AM-PAC Inpatient Mobility without Stair Climbing Raw Score : 13 (02/06/22 1059)  AM-PAC Inpatient without Stair Climbing T-Scale Score : 38.96 (02/06/22 1059)  Mobility Inpatient CMS 0-100% Score: 58.44 (02/06/22 1059)  Mobility Inpatient without Stair CMS G-Code Modifier : CK (02/06/22 1059)       Goals  Short term goals  Time Frame for Short term goals: 20 days  Short term goal 1: Pt will be independent with bed mob/transfers to restore functional independence. Short term goal 2: Pt will ambulate >/= 100ft with LRAD at level of SUP without LOB/unsteadiness. Short term goal 3: Pt will tolerate 20-30mins ther ex/act to improve endurance for ADLs and functional tasks.        Therapy Time   Individual Concurrent Group Co-treatment   Time In 1050         Time Out 1104         Minutes 14                 Lindy Arriaga, JYOTHI

## 2022-02-06 NOTE — PROGRESS NOTES
Second assessment completed. Pt more alert. Some changes from first assessment, see flow sheet for details. Will continue to monitor.

## 2022-02-06 NOTE — PROGRESS NOTES
HS medications not given at scheduled time due to increase lethargy. Bi-pap remains on with patient resting, vitals within normal. Will continue to monitor.

## 2022-02-06 NOTE — PROGRESS NOTES
Patient awake and up to commode, unable to measure output due to excess toilet paper. Patient requesting HS medications at this time, see MAR. Call light and bedside table within reach, bed alarm active for safety. Will continue to monitor.

## 2022-02-06 NOTE — PROGRESS NOTES
Writer to room to assess vitals and shift assessment. Patient presents lethargic, but once awake able to maintain a conversation with writer. Patient to commode with little to no results, returns to bed and request pain medication for chronic pain 7/10. Gown and bedding changed. Denies nausea or further needs. Call light and bedside table within reach, bed alarm active for safety. Will continue to monitor.

## 2022-02-06 NOTE — PROGRESS NOTES
Writer to bedside to complete morning assessment. Upon entry to room, pt resting quietly in bed with eyes shut, respirations even and unlabored while on BiPAP. Vitals obtained and assessment completed, see flow sheet for details. Pt arouses to name called slight tactile stimulation. Pt sat up for breakfast, then pt needed to use bathroom. Pt assisted contact guard to bedside commode then up to chair. Pt denies needs from writer at this time. Call light in reach. Will continue to monitor.

## 2022-02-06 NOTE — PROGRESS NOTES
Writer to room to reassess need for pain medication. Patient resting with eyes closed, will continue to monitor.

## 2022-02-06 NOTE — PROGRESS NOTES
Updated Dr Frank Salvador of lactic and troponin, okay to cancel cardiology consult as troponin is normalized.

## 2022-02-06 NOTE — PROGRESS NOTES
Occupational Therapy   Occupational Therapy Initial Assessment  Date: 2022   Patient Name: Mari Salazar  MRN: [id number]     : 1945    Date of Service: 2022    Discharge Recommendations:  Continue to assess pending progress,Home with assist PRN,Subacute/Skilled Nursing Facility       Assessment   Performance deficits / Impairments: Decreased functional mobility ; Decreased balance;Decreased coordination;Decreased ADL status; Decreased endurance;Decreased strength;Decreased ROM  Assessment: Pt is a 77 luis old female admitted for Atypical Pnemonia. Pt reports she was previously independent with no use of AD for mobility throughout her home. Pt demonstrates decreased strength, endurance, balance, and overall activity tolerance to complete daily tasks. Pt would benefit from skilled OT services to address these concerns for a safe return home. Treatment Diagnosis: generalized weakness  Prognosis: Fair  Decision Making: Medium Complexity  OT Education: OT Role;Plan of Care  Patient Education: pt educated on safety with use of RW until pt is able to gain more strength and ambulate independent safely. REQUIRES OT FOLLOW UP: Yes  Activity Tolerance  Activity Tolerance: Patient limited by fatigue  Safety Devices  Safety Devices in place: Yes  Type of devices: All fall risk precautions in place; Left in chair;Call light within reach           Patient Diagnosis(es): The encounter diagnosis was Multifocal pneumonia. has a past medical history of Chronic pain syndrome, COPD (chronic obstructive pulmonary disease) ([address] Depression, GERD (gastroesophageal reflux disease), Hypothyroidism, and Osteoporosis. has a past surgical history that includes Hysterectomy; back surgery; Breast surgery; Carpal tunnel release; joint replacement; joint replacement; fracture surgery (Left, 2018); and Wrist fracture surgery (Left, 2018).     Treatment Diagnosis: generalized weakness      Restrictions  Restrictions/Precautions  Restrictions/Precautions: General Precautions,Fall Risk,Contact Precautions,Isolation    Subjective   General  Chart Reviewed: Yes  Patient assessed for rehabilitation services?: Yes  Patient Currently in Pain: Denies  Vital Signs  Temp: 97.5 °F (36.4 °C)  Temp Source: Temporal  Pulse: 77  Heart Rate Source: Telemetry  Resp: 17  BP: (!) 104/39  BP Location: Left upper arm  MAP (mmHg): (!) 62  Patient Position: Up in chair  Level of Consciousness: Alert (0)  MEWS Score: 1  Patient Currently in Pain: Denies  Oxygen Therapy  SpO2: 95 %  Pulse Oximeter Device Mode: Continuous  Pulse Oximeter Device Location: Right;Finger  O2 Device: Nasal cannula  O2 Flow Rate (L/min): 4 L/min  Blood Gas  Performed?: Yes  Marty's Test #1: Collateral flow confirmed  Site #1: Right Radial  Site Prepped #1: Yes  Number of Attempts #1: 1  Pressure Held #1: Yes  Complications #1: None  Post-procedure #1: Standard  Specimen Status #1: To lab  How Tolerated?: Tolerated well  Social/Functional History  Social/Functional History  Lives With: Spouse  Type of Home: House  Home Layout: Two level,Able to Live on Main level with bedroom/bathroom,Performs ADL's on one level  Home Access: Stairs to enter with rails  Entrance Stairs - Number of Steps: 1  Bathroom Shower/Tub: Tub/Shower unit  Bathroom Toilet: Standard  Home Equipment: Rolling walker,Cane (pt reports she typically doesn't use AD)  ADL Assistance: Independent  Homemaking Assistance: Independent  Homemaking Responsibilities: Yes  Ambulation Assistance: Needs assistance  Transfer Assistance: Needs assistance  IADL Comments: Pt states her  does most of the cooking and she helps with the cleaning       Objective   Vision: Within Functional Limits  Hearing: Within functional limits          Balance  Sitting Balance: Independent  Standing Balance: Contact guard assistance  Functional Mobility  Functional - Mobility Device: Rolling Walker  Assist Level: Contact guard assistance  Functional Mobility Comments: pt declined need for use of walker initially, but due to unsteady gait, therapist recommended use until safe to walk independently. ADL  Feeding: Independent  Grooming: Setup  UE Bathing: Setup  LE Bathing: Minimal assistance  UE Dressing: Setup  LE Dressing: Minimal assistance  Toileting: Contact guard assistance           Transfers  Sit to stand: Contact guard assistance;Stand by assistance  Stand to sit: Stand by assistance;Contact guard assistance     Cognition  Overall Cognitive Status: WFL                 LUE AROM (degrees)  LUE AROM : Exceptions  L Shoulder Flexion 0-180: 0/80  RUE AROM (degrees)  RUE AROM : WFL  LUE Strength  Gross LUE Strength: Exceptions to [facility]  L Hand General: 2-/5  RUE Strength  Gross RUE Strength: Exceptions to [facility]  R Hand General: 2-/5                   Plan   Plan  Times per week: 7  Times per day: Daily  Current Treatment Recommendations: Strengthening,Safety Education & Training,ROM,Balance Training,Patient/Caregiver Education & Training,Self-Care / ADL,Functional Mobility Training,Endurance Training      AM-PAC Score        [facility] Inpatient Daily Activity Raw Score: 19 (02/06/22 1158)  AM-PAC Inpatient ADL T-Scale Score : 40.22 (02/06/22 1158)  ADL Inpatient CMS 0-100% Score: 42.8 (02/06/22 1158)  ADL Inpatient CMS G-Code Modifier : CK (02/06/22 1158)    Goals  Short term goals  Time Frame for Short term goals: 21 visits  Short term goal 1: Pt will tolerate >5 mintues of BUE ther ex with no more than 2 RBs to improve functional strength for ADLs. Short term goal 2: Patient will complete functional mobility activity for ADL tasks with SBA using LRAD to increase safety and independence. Short term goal 3: Pt will complete basic self-care standing sinkside with SBA to increase standing tolerance and balance for ADLs.        Therapy Time   Individual Concurrent Group Co-treatment   Time In 1050         Time Out [address] [place] [address]

## 2022-02-07 LAB
ABSOLUTE EOS #: 0.14 K/UL (ref 0–0.44)
ABSOLUTE IMMATURE GRANULOCYTE: 0.05 K/UL (ref 0–0.3)
ABSOLUTE LYMPH #: 1.31 K/UL (ref 1.1–3.7)
ABSOLUTE MONO #: 0.56 K/UL (ref 0.1–1.2)
ANION GAP SERPL CALCULATED.3IONS-SCNC: 7 MMOL/L (ref 9–17)
BASOPHILS # BLD: 0 % (ref 0–2)
BASOPHILS ABSOLUTE: <0.03 K/UL (ref 0–0.2)
BUN BLDV-MCNC: 17 MG/DL (ref 8–23)
BUN/CREAT BLD: 24 (ref 9–20)
CALCIUM SERPL-MCNC: 9.8 MG/DL (ref 8.6–10.4)
CHLORIDE BLD-SCNC: 99 MMOL/L (ref 98–107)
CO2: 33 MMOL/L (ref 20–31)
CREAT SERPL-MCNC: 0.7 MG/DL (ref 0.5–0.9)
DIFFERENTIAL TYPE: ABNORMAL
EOSINOPHILS RELATIVE PERCENT: 1 % (ref 1–4)
GFR AFRICAN AMERICAN: >60 ML/MIN
GFR NON-AFRICAN AMERICAN: >60 ML/MIN
GFR SERPL CREATININE-BSD FRML MDRD: ABNORMAL ML/MIN/{1.73_M2}
GFR SERPL CREATININE-BSD FRML MDRD: ABNORMAL ML/MIN/{1.73_M2}
GLUCOSE BLD-MCNC: 83 MG/DL (ref 70–99)
HCT VFR BLD CALC: 36.1 % (ref 36.3–47.1)
HEMOGLOBIN: 11.1 G/DL (ref 11.9–15.1)
IMMATURE GRANULOCYTES: 0 %
LYMPHOCYTES # BLD: 12 % (ref 24–43)
MCH RBC QN AUTO: 29.5 PG (ref 25.2–33.5)
MCHC RBC AUTO-ENTMCNC: 30.7 G/DL (ref 28.4–34.8)
MCV RBC AUTO: 96 FL (ref 82.6–102.9)
MONOCYTES # BLD: 5 % (ref 3–12)
NRBC AUTOMATED: 0 PER 100 WBC
PDW BLD-RTO: 15.9 % (ref 11.8–14.4)
PLATELET # BLD: 178 K/UL (ref 138–453)
PLATELET ESTIMATE: ABNORMAL
PMV BLD AUTO: 9.5 FL (ref 8.1–13.5)
POTASSIUM SERPL-SCNC: 4.4 MMOL/L (ref 3.7–5.3)
RBC # BLD: 3.76 M/UL (ref 3.95–5.11)
RBC # BLD: ABNORMAL 10*6/UL
SEG NEUTROPHILS: 82 % (ref 36–65)
SEGMENTED NEUTROPHILS ABSOLUTE COUNT: 9.19 K/UL (ref 1.5–8.1)
SODIUM BLD-SCNC: 139 MMOL/L (ref 135–144)
WBC # BLD: 11.3 K/UL (ref 3.5–11.3)
WBC # BLD: ABNORMAL 10*3/UL

## 2022-02-07 PROCEDURE — 6360000002 HC RX W HCPCS: Performed by: INTERNAL MEDICINE

## 2022-02-07 PROCEDURE — 6370000000 HC RX 637 (ALT 250 FOR IP): Performed by: INTERNAL MEDICINE

## 2022-02-07 PROCEDURE — 6370000000 HC RX 637 (ALT 250 FOR IP): Performed by: NURSE PRACTITIONER

## 2022-02-07 PROCEDURE — 2580000003 HC RX 258: Performed by: INTERNAL MEDICINE

## 2022-02-07 PROCEDURE — 97116 GAIT TRAINING THERAPY: CPT

## 2022-02-07 PROCEDURE — 94669 MECHANICAL CHEST WALL OSCILL: CPT

## 2022-02-07 PROCEDURE — 1200000000 HC SEMI PRIVATE

## 2022-02-07 PROCEDURE — 85025 COMPLETE CBC W/AUTO DIFF WBC: CPT

## 2022-02-07 PROCEDURE — 2580000003 HC RX 258: Performed by: NURSE PRACTITIONER

## 2022-02-07 PROCEDURE — 94640 AIRWAY INHALATION TREATMENT: CPT

## 2022-02-07 PROCEDURE — 80048 BASIC METABOLIC PNL TOTAL CA: CPT

## 2022-02-07 PROCEDURE — 97110 THERAPEUTIC EXERCISES: CPT

## 2022-02-07 PROCEDURE — 94660 CPAP INITIATION&MGMT: CPT

## 2022-02-07 PROCEDURE — 97535 SELF CARE MNGMENT TRAINING: CPT

## 2022-02-07 PROCEDURE — 2700000000 HC OXYGEN THERAPY PER DAY

## 2022-02-07 PROCEDURE — 36415 COLL VENOUS BLD VENIPUNCTURE: CPT

## 2022-02-07 PROCEDURE — 94761 N-INVAS EAR/PLS OXIMETRY MLT: CPT

## 2022-02-07 RX ORDER — GUAIFENESIN/DEXTROMETHORPHAN 100-10MG/5
5 SYRUP ORAL EVERY 4 HOURS PRN
Status: DISCONTINUED | OUTPATIENT
Start: 2022-02-07 | End: 2022-02-09

## 2022-02-07 RX ADMIN — LEVOTHYROXINE SODIUM 150 MCG: 150 TABLET ORAL at 08:36

## 2022-02-07 RX ADMIN — PREDNISONE 30 MG: 10 TABLET ORAL at 08:36

## 2022-02-07 RX ADMIN — HYDROCODONE BITARTRATE AND ACETAMINOPHEN 1 TABLET: 10; 325 TABLET ORAL at 08:37

## 2022-02-07 RX ADMIN — POTASSIUM BICARBONATE 20 MEQ: 782 TABLET, EFFERVESCENT ORAL at 21:53

## 2022-02-07 RX ADMIN — PIPERACILLIN AND TAZOBACTAM 3375 MG: 3; .375 INJECTION, POWDER, FOR SOLUTION INTRAVENOUS at 06:03

## 2022-02-07 RX ADMIN — IPRATROPIUM BROMIDE AND ALBUTEROL SULFATE 1 AMPULE: 2.5; .5 SOLUTION RESPIRATORY (INHALATION) at 20:47

## 2022-02-07 RX ADMIN — Medication 2000 UNITS: at 08:36

## 2022-02-07 RX ADMIN — RIVAROXABAN 20 MG: 20 TABLET, FILM COATED ORAL at 08:36

## 2022-02-07 RX ADMIN — GUAIFENESIN AND DEXTROMETHORPHAN 5 ML: 100; 10 SYRUP ORAL at 12:18

## 2022-02-07 RX ADMIN — POTASSIUM BICARBONATE 20 MEQ: 782 TABLET, EFFERVESCENT ORAL at 08:37

## 2022-02-07 RX ADMIN — DULOXETINE 60 MG: 60 CAPSULE, DELAYED RELEASE ORAL at 08:36

## 2022-02-07 RX ADMIN — PIPERACILLIN AND TAZOBACTAM 3375 MG: 3; .375 INJECTION, POWDER, FOR SOLUTION INTRAVENOUS at 01:12

## 2022-02-07 RX ADMIN — IPRATROPIUM BROMIDE AND ALBUTEROL SULFATE 1 AMPULE: 2.5; .5 SOLUTION RESPIRATORY (INHALATION) at 10:02

## 2022-02-07 RX ADMIN — IPRATROPIUM BROMIDE AND ALBUTEROL SULFATE 1 AMPULE: 2.5; .5 SOLUTION RESPIRATORY (INHALATION) at 05:17

## 2022-02-07 RX ADMIN — CALCIUM CARBONATE-VITAMIN D TAB 500 MG-200 UNIT 1 TABLET: 500-200 TAB at 17:05

## 2022-02-07 RX ADMIN — SODIUM CHLORIDE, PRESERVATIVE FREE 10 ML: 5 INJECTION INTRAVENOUS at 08:37

## 2022-02-07 RX ADMIN — DEXTROSE MONOHYDRATE 3375 MG: 50 INJECTION, SOLUTION INTRAVENOUS at 13:35

## 2022-02-07 RX ADMIN — CALCIUM CARBONATE-VITAMIN D TAB 500 MG-200 UNIT 1 TABLET: 500-200 TAB at 08:36

## 2022-02-07 RX ADMIN — PREGABALIN 300 MG: 75 CAPSULE ORAL at 08:37

## 2022-02-07 RX ADMIN — ASPIRIN 81 MG: 81 TABLET, CHEWABLE ORAL at 08:37

## 2022-02-07 RX ADMIN — PANTOPRAZOLE SODIUM 40 MG: 40 TABLET, DELAYED RELEASE ORAL at 08:36

## 2022-02-07 RX ADMIN — FUROSEMIDE 20 MG: 20 TABLET ORAL at 08:36

## 2022-02-07 RX ADMIN — TRAZODONE HYDROCHLORIDE 200 MG: 50 TABLET ORAL at 21:49

## 2022-02-07 RX ADMIN — HYDROXYCHLOROQUINE SULFATE 200 MG: 200 TABLET ORAL at 08:36

## 2022-02-07 RX ADMIN — IPRATROPIUM BROMIDE AND ALBUTEROL SULFATE 1 AMPULE: 2.5; .5 SOLUTION RESPIRATORY (INHALATION) at 16:25

## 2022-02-07 RX ADMIN — PREGABALIN 300 MG: 75 CAPSULE ORAL at 21:49

## 2022-02-07 RX ADMIN — Medication 1000 MG: at 21:49

## 2022-02-07 RX ADMIN — Medication 1000 MG: at 08:37

## 2022-02-07 RX ADMIN — DEXTROSE MONOHYDRATE 3375 MG: 50 INJECTION, SOLUTION INTRAVENOUS at 21:56

## 2022-02-07 RX ADMIN — SODIUM CHLORIDE, PRESERVATIVE FREE 10 ML: 5 INJECTION INTRAVENOUS at 21:54

## 2022-02-07 ASSESSMENT — PAIN SCALES - GENERAL
PAINLEVEL_OUTOF10: 0
PAINLEVEL_OUTOF10: 0
PAINLEVEL_OUTOF10: 8

## 2022-02-07 ASSESSMENT — PAIN DESCRIPTION - PROGRESSION
CLINICAL_PROGRESSION: NOT CHANGED

## 2022-02-07 NOTE — RT PROTOCOL NOTE
RESPIRATORY ASSESSMENT PROTOCOL                                                                                              Patient Name: Marco Mack Room#: [id number]/[id number] : 1945     Admitting diagnosis: Atypical pneumonia [J18.9]  Multifocal pneumonia [J18.9]       Medical History:   Past Medical History:   Diagnosis Date    Chronic pain syndrome     dilaudid infusion pump    [address] ([facility]ronic obstructive [facility] disease) [address] [address]     Depression     GERD (gastroesophageal reflux disease)     Hypothyroidism     Osteoporosis        PATIENT ASSESSMENT    LABORATORY DATA  Hematology:   Lab Results   Component Value Date    WBC 11.3 2022    RBC 3.76 2022    HGB 11.1 2022    HCT 36.1 2022     2022     Chemistry:    Lab Results   Component Value Date    PHART 7.398 2022    CAW5RZK 54.3 2022    PO2ART 75.9 2022    F4CONVWO 95.0 2022    LLK9JQC 32.7 2022    PBEA 6.2 2022       VITALS  Pulse: 85   Resp: 17  BP: 139/73  SpO2: 93 % O2 Device: Nasal cannula  Temp: 97.4 °F (36.3 °C)    SKIN COLOR  [] Normal  [] Pale  [] Dusky  [] Cyanotic    RESPIRATORY PATTERN  [] Normal  [] Dyspnea  [] Cheyne-Granger  [] Kussmaul  [] Biots    AMBULATORY  [] Yes  [] No  [] With Assistance      Patient Acuity 0 1 2 3 4 Score   Level of Concious (LOC) [x]  Alert & Oriented or Pt normal LOC []  Confused;follows directions []  Confused & uncooper-ative []  Obtunded []  Comatose 0   Respiratory Rate  (RR) [x]  Reg. rate & pattern. 12 - 20 bpm  []  Increased RR. Greater than 20 bpm   []  SOB w/ exertion or RR greater than 24 bpm []  Access- ory muscle use at rest. Abn.  resp. []  SOB at rest.   0   Bilateral Breath Sounds (BBS) []  Clear []  Diminish-ed bases  [x]  Diminish-ed t/o, or rales   []  Sporadic, scattered wheezes or rhonchi []  Persistentwheezes and, or absent BBS 2   Cough []  Strong, effective, & non-prod. [x]  Effective & prod.  Less than 25 ml (2 TBSP) over past 24 hrs []  Ineffective & non-prod to less than 25 ML over past 24 hrs []  Ineffective and, or greater than 25 ml sputum prod. past 24 hrs. []  Nonspon- taneous; Requires suctioning 1   Pulmonary History  (PULM HX) []  No smoking and no chronic pulmonaryhistory []  Former smoker. Quit over 12 mos. ago []  Current smoker or quit w/ in 12 mos []  Pulm. History and, or 20 pk/yr smoking hx [x]  Admitted w/ acute pulm. dx and, or has been admitted w/ pulm. dx 2 or more times over past 12 mos 4   Surgical History this Admit  (SURG HX) [x]  No surgery []  General surgery []  Lower abdominal []  Thoracic or upper abdominal   []  Thoracic w/ pulm. disease 0   Chest X-Ray (CXR)/CT Scan []  Clear or not applicable []  Not available []  Atelect- asis or pleural effusions [x]  Localized infiltrate or pulm. edema []  Con-solidated Infiltrates, bilateral, or in more than 1 lobe 3   Slow or Forced VC, FEV1 OR PEFR (PULM FXN)  [x]  80% or greater, or not indicated []  Pt. unable to perform []  FEV1 or PEFR or VC 51-79%. []  FEV1 or PEFR or VC  30-49%   []  FEV1 or PEFR or VC less than 30%   0   TOTAL ACUITY: 10       CARE PLAN    If Acuity Level is 2, 3, or 4 in any of the following:    [] BILATERAL BREATH SOUNDS (BBS)     [] PULMONARY HISTORY (PULM HX)  [] PULMONARY FUNCTION (PULM FX)    Goal: Improve respiratory functions in patients with airway disease and decrease WOB    [x] AEROSOL PROTOCOL    Total Acuity:   16-32  []  Secondary Assessment in 24 hrs Total Acuity:  9-15  [x]  Secondary Assessment in 24 hrs Total Acuity:  4-8  []  Secondary Assessment in 48 hrs Total Acuity:  0-3  []  Secondary Assessment in 72 hrs   HHN AEROSOL THERAPY with  [physician-ordered bronchodilator(s)] q 4 & Albuterol PRN q2 hrs. Breath-Actuated Neb if BBS Acuity = 4, and pt. can use MP. Notify physician if condition deteriorates. HHN AEROSOL THERAPY with  [physician-ordered bronchodilator(s)]  QID and Albuterol PRN q4 hrs. Breath-Actuated Neb if BBS Acuity = 4, and pt. can use MP. Notify physician if condition deteriorates. MDI THERAPY with  2 actuations of [physician-ordered bronchodilator(s)] via spacer TID Albuterol and PRNq4 hrs. If unable to utilize MDI: HHN [physician-ordered bronchodilator(s)] TID and Albuterol PRN q4 hrs. Notify physician if condition deteriorates. MDI THERAPY with  [physician-ordered bronchodilator(s)] via spacer TID PRN. If unable to utilize MDI: HHN [physician-ordered bronchodilator(s)] TID PRN. Notify physician if condition deteriorates. If Acuity Level is 2, 3, or 4 in any of the following:    [] COUGH     [] SURGICAL HISTORY (SURG HX)  [] CHEST XRAY (CXR)    Goal: Improvement in sputum mobilization in patients with ineffective airway clearance. Reverse atelectasis. [x] Bronchopulmonary Hygiene Protocol    Total Acuity:   16-32  []  Secondary Assessment in 24 hrs Total Acuity:  9-15  [x]  Secondary Assessment in 24 hrs Total Acuity:  4-8  []  Secondary Assessment in 48 hrs Total Acuity:  0-3  []  Secondary Assessment in 72 hrs   METANEB QID with [physician-ordered bronchodilator(s)] if CXR Acuity = 4; otherwise:  PD&P, PEP, or Vest QID & PRN  NT Sxn PRN for ineffective cough  METANEB QID with [physician-ordered bronchodilator(s)] if CXR Acuity = 4; otherwise:  PD&P, PEP, or Vest TID & PRN  NT Sxn PRN for ineffective cough  Instruct patient to self-perform IS q1hr WA   Directed Cough self-performed q1hr WA     If Acuity Level is 2 or above in the following:    [] PULMONARY HISTORY (PULM HX)    Goal: Assist patient in quitting smoking to slow or stop the progression of lung disease.     [] Smoking Cessation Protocol    SMOKING CESSATION EDUCATION provided according to policy [id number]: (marlyn with an X)  ____Yes    ____ No     ____ NA    Smoking Cessation Booklet given:  ____Yes  ____No ____Patient Romy Dykes

## 2022-02-07 NOTE — PROGRESS NOTES
assistance  Ambulation  Ambulation?: Yes  Ambulation 1  Surface: level tile  Device: Rolling Walker  Assistance: Contact guard assistance;Stand by assistance  Gait Deviations: None  Distance: 90ftx1,20ftx1  Comments: reports of dizziness at end of ambulation  Stairs/Curb  Stairs?: No        Exercises  Comments: Seated exercises B EL x20         Comment: STS x5    Goals  Short term goals  Time Frame for Short term goals: 20 days  Short term goal 1: Pt will be independent with bed mob/transfers to restore functional independence. Short term goal 2: Pt will ambulate >/= 100ft with LRAD at level of SUP without LOB/unsteadiness. Short term goal 3: Pt will tolerate 20-30mins ther ex/act to improve endurance for ADLs and functional tasks. Plan    Plan  Times per week: 7 days per week  Times per day: Twice a day  Current Treatment Recommendations: Strengthening,Balance Training,Transfer Training,IADL Training,ADL/Self-care Training,Stair training,Endurance Training,Gait Training,Neuromuscular Re-education,Home Exercise Program,Patient/Caregiver Education & Training,Safety Education & Training  Safety Devices  Type of devices:  All fall risk precautions in place,Left in chair,Call light within reach,Nurse notified,Gait belt     Therapy Time   Individual Concurrent Group Co-treatment   Time In 1030         Time Out 1054         Minutes [address], [place]

## 2022-02-07 NOTE — PROGRESS NOTES
Discussed discharge plans with the patient. Patient is a 77year old female here with Sepsis and acute respiratory failure due to atypical pneumonia. She is alert , oriented , pleasant , cooperative during our conversation.     Patient is  and lives at home with her . Eric Smith has a walker, wheelchair, shower chair, and grabs at home if needed. . The patient does the cleaning and the  does the cooking. She is independent with her ADL's. Her  manages her  medications and her  also provides the transportation. She has no outside services in the home.     Her PCP is Raulito Suarez MD. She has medical insurance that helps with medication costs.     The discharge plan is home with no services at this time. She does not have advance directives and is not interested in doing them.  LSW to monitor and assist with any needs or concerns as they arise. Patient may need oxygen on discharge. She is refusing home health and any other services at this time.     BUZZ Jack

## 2022-02-07 NOTE — PROGRESS NOTES
Comprehensive Nutrition Assessment    Type and Reason for Visit:  Initial    Nutrition Recommendations/Plan:  Encourage oral intakes    Nutrition Assessment:  Predicted suboptimal nutrient intakes r/t altered respiratory status, AEB recurrent pneumonia s/p covid in 2020. PO currently good, moving out of ICU. Had lost weight down to 142. 4# after covid hospitalization, gaining over winter she states. Improved K+ and renal indices. Malnutrition Assessment:  Malnutrition Status: At risk for malnutrition (Comment)    Context:  Acute Illness     Findings of the 6 clinical characteristics of malnutrition:  Energy Intake:  No significant decrease in energy intake  Weight Loss:  No significant weight loss     Body Fat Loss:  No significant body fat loss     Muscle Mass Loss:  No significant muscle mass loss    Fluid Accumulation:  No significant fluid accumulation     Strength:  Not Performed    Estimated Daily Nutrient Needs:  Energy (kcal):  8743-4652 (18-23); Weight Used for Energy Requirements:  Current     Protein (g):  55-64 (1.2-1.4); Weight Used for Protein Requirements:  Ideal        Fluid (ml/day):  1700; Method Used for Fluid Requirements:  1 ml/kcal      Nutrition Related Findings:  no malnutrition indices      Wounds:  None       Current Nutrition Therapies:    ADULT DIET; Regular    Anthropometric Measures:  · Height: 5' 4\" (162.6 cm)  · Current Body Weight: 163 lb 2.3 oz (74 kg)   · Admission Body Weight: 155 lb (70.3 kg)    · Usual Body Weight: 147 lb (66.7 kg) (prior to covid in 2020)     · Ideal Body Weight: 120 lbs; % Ideal Body Weight 136 %   · BMI: 28  · Adjusted Body Weight:  ; No Adjustment   · BMI Categories: Overweight (BMI 25.0-29. 9)       Nutrition Diagnosis:   · Predicted inadequate energy intake related to impaired respiratory function as evidenced by poor intake prior to admission    Lab Results   Component Value Date     02/07/2022    K 4.4 02/07/2022    CL 99 02/07/2022    CO2 33 (H) 02/07/2022    BUN 17 02/07/2022    CREATININE 0.70 02/07/2022    GLUCOSE 83 02/07/2022    CALCIUM 9.8 02/07/2022    PROT 6.2 (L) 02/05/2022    LABALBU 3.9 02/05/2022    BILITOT 0.52 02/05/2022    ALKPHOS 53 02/05/2022    AST 22 02/05/2022    ALT 15 02/05/2022    LABGLOM >60 02/07/2022    GFRAA >60 02/07/2022    GLOB NOT REPORTED 06/10/2019     No results found for: LABA1C  No results found for: EAG  No results found for: VITD25    Nutrition Interventions:   Food and/or Nutrient Delivery:  Continue Current Diet  Nutrition Education/Counseling:  No recommendation at this time   Coordination of Nutrition Care:  Continue to monitor while inpatient    Goals:  PO >75% meals       Nutrition Monitoring and Evaluation:   Behavioral-Environmental Outcomes:  None Identified   Food/Nutrient Intake Outcomes:  Food and Nutrient Intake  Physical Signs/Symptoms Outcomes:  Biochemical Data,Weight     Discharge Planning:    No discharge needs at this time     Electronically signed by Yan Worrell RD, LD on 2/7/22 at 12:54 PM EST    Contact: [phone]

## 2022-02-07 NOTE — PROGRESS NOTES
Cont abx per primary team   Writer to room to assess vitals and shift assessment. Patient awake and alert sitting up in chair. Patient c/o chronic back, denies nausea or needs at this time. Call light and bedside table within reach. Will continue to monitor.

## 2022-02-07 NOTE — PROGRESS NOTES
completed sinkside ADL's mod I with s/u provided. Pt initially stood at the sink to wash up but then sat on commode to finish d/t increased fatigue. Balance  Sitting Balance: Independent  Standing Balance: Contact guard assistance  Functional Mobility  Functional - Mobility Device: No device  Activity: To/from bathroom  Assist Level: Contact guard assistance  Functional Mobility Comments: pt declined need for use of walker initially, but due to unsteady gait, therapist recommended use until safe to walk independently. Toilet Transfers  Toilet - Technique: Ambulating  Equipment Used: Standard toilet  Toilet Transfer: Stand by assistance     Transfers  Sit to stand: Stand by assistance  Stand to sit: Stand by assistance                                                                 Plan   Plan  Times per week: 7  Times per day: Daily  Current Treatment Recommendations: Strengthening,Safety Education & Training,ROM,Balance Training,Patient/Caregiver Education & Training,Self-Care / ADL,Functional Mobility Training,Endurance Training  G-Code     OutComes Score                                                  AM-PAC Score             Goals  Short term goals  Time Frame for Short term goals: 21 visits  Short term goal 1: Pt will tolerate >5 mintues of BUE ther ex with no more than 2 RBs to improve functional strength for ADLs. Short term goal 2: Patient will complete functional mobility activity for ADL tasks with SBA using LRAD to increase safety and independence. Short term goal 3: Pt will complete basic self-care standing sinkside with SBA to increase standing tolerance and balance for ADLs.        Therapy Time   Individual Concurrent Group Co-treatment   Time In 1005         Time Out 1035         Minutes [address] [place] [address]

## 2022-02-07 NOTE — PROGRESS NOTES
INTENSIVE CARE UNIT   APRN - Progress Note    Patient - Esperanza Jeans  Date of Admission -  2022 10:49 AM  Date of Evaluation -  2022  Hospital Day - 2      SUBJECTIVE:     The Esperanza Jeans is a 77 y.o. female who is seen for follow up in the ICU. Per nursing report and notes, overnight events include: no significant events. She sitting up in bed alert oriented no acute distress. Patient has no complaints. She stated she sat up in the chair almost all day yesterday and tolerated well. She states she feels better when sitting in a chair. ROS:   Constitutional: negative  for fevers, and negative for chills. Respiratory: positive for shortness of breath, negative for cough, and negative for wheezing  Cardiovascular: negative for chest pain, and negative for palpitations  Gastrointestinal: negative for abdominal pain, negative for nausea,negative for vomiting, negative for diarrhea, and negative for constipation    All other systems were reviewed with the patient and are negative unless otherwise stated in HPI.       OBJECTIVE:     VITAL SIGNS:  Patient Vitals for the past 8 hrs:   BP Temp Temp src Pulse Resp SpO2 Weight   22 0800 139/73 97.4 °F (36.3 °C) Temporal 85 17 93 % --   22 0700 (!) 102/50 -- -- 67 11 92 % --   22 0600 (!) 110/45 -- -- 68 11 98 % --   22 0542 (!) 122/49 -- -- 69 12 98 % --   22 0530 -- 97.2 °F (36.2 °C) Temporal 69 15 100 % 163 lb 3 oz (74 kg)   22 0500 113/63 -- -- 64 10 99 % --   22 0430 105/62 -- -- 65 12 (!) 86 % --   22 0400 (!) 108/53 -- -- 69 11 (!) 89 % --   22 0330 (!) 112/56 -- -- 66 8 98 % --   22 0300 (!) 110/57 -- -- 68 11 97 % --   22 0230 126/87 -- -- 68 20 94 % --         Temp: 97.4 °F (36.3 °C)  Temp range:    Temp  Av.7 °F (36.5 °C)  Min: 97.2 °F (36.2 °C)  Max: 98.2 °F (36.8 °C)    BP: 139/73  BP Range:      Systolic (40QKR), HRM:633 , Min:97 , LIL:923      Diastolic (18AEP), Av, Min:24, Max:87    Pulse: 85  Pulse Range:    Pulse  Av.2  Min: 64  Max: 111    Resp: 17  Resp Range:   Resp  Avg: 15.6  Min: 8  Max: 22    SpO2: 93 % on room air  SpO2 range:   SpO2  Av.9 %  Min: 86 %  Max: 100 %    Weight  Wt Readings from Last 3 Encounters:   22 163 lb 3 oz (74 kg)   22 155 lb (70.3 kg)   22 146 lb (66.2 kg)     Body mass index is 28.01 kg/m². 24HR INTAKE/OUTPUT:      Intake/Output Summary (Last 24 hours) at 2022 1018  Last data filed at 2022 0530  Gross per 24 hour   Intake 200 ml   Output --   Net 200 ml     Date 22 0000 - 22 2359   Shift 2930-5768 3140-8118 0027-9649 24 Hour Total   INTAKE   P.O. 200   200   Shift Total(mL/kg) 200(2.7)   200(2.7)   OUTPUT   Shift Total(mL/kg)       Weight (kg) 74 74 74 74         PHYSICAL EXAM:  GEN:    Awake and following commands:     [] No   [x] Yes  MENTAL STATUS: alert and oriented x3. DISTRESS: Acute respiratory distress:       [x] No   [] Yes  EYES:  EOMI, pupils equal   NECK: Supple. No lymphadenopathy. No carotid bruit  CVS:    regular rate and rhythm, no audible murmur  PULM:  diminished with Scattered rhonchi, no acute respiratory distress  ABD:    Bowels sounds normal.  Abdomen is soft. No distention. no tenderness to palpation. EXT:   no edema bilaterally . No calf tenderness. NEURO: Moves all extremities. Motor and sensory are grossly intact  SKIN:  No rashes. No skin lesions.           MEDICATIONS:  Scheduled Meds:   piperacillin-tazobactam  3,375 mg IntraVENous Q8H    ipratropium-albuterol  1 ampule Inhalation 4x daily    predniSONE  30 mg Oral Daily    Followed by   Enrique Bullock ON 2022] predniSONE  20 mg Oral Daily    Followed by   Enrique Bullock ON 2022] predniSONE  10 mg Oral Daily    Followed by   Enrique Bullock ON 2022] predniSONE  5 mg Oral Daily    levothyroxine  150 mcg Oral Daily    aspirin  81 mg Oral Daily    DULoxetine  60 mg Oral Daily    traZODone  200 mg Oral Nightly    pantoprazole  40 mg Oral QAM AC    oyster shell calcium w/D  1 tablet Oral BID WC    pregabalin  300 mg Oral BID    ascorbic acid  1,000 mg Oral BID    Vitamin D  2,000 Units Oral Daily    rivaroxaban  20 mg Oral Daily with breakfast    furosemide  20 mg Oral Daily    hydroxychloroquine  200 mg Oral Daily    sodium chloride flush  5-40 mL IntraVENous 2 times per day    potassium bicarb-citric acid  20 mEq Oral BID     Continuous Infusions:   sodium chloride       PRN Meds:   HYDROcodone-acetaminophen, 1 tablet, Q6H PRN  sodium chloride flush, 5-40 mL, PRN  sodium chloride, 25 mL, PRN  ondansetron, 4 mg, Q8H PRN   Or  ondansetron, 4 mg, Q6H PRN  polyethylene glycol, 17 g, Daily PRN  acetaminophen, 650 mg, Q6H PRN   Or  acetaminophen, 650 mg, Q6H PRN  albuterol, 2.5 mg, Q2H PRN  albuterol sulfate HFA, 2 puff, Q4H PRN            VASOPRESSORS:    [] No      [] Yes  [] Levophed      [] Dopamine     [] Vasopressin      [] Dobutamine     [] Phenylephrine     [] Epinephrine        DATA:  Complete Blood Count:   Recent Labs     02/05/22  1105 02/06/22  0535 02/07/22  0530   WBC 10.4 14.6* 11.3   RBC 4.75 4.02 3.76*   HGB 13.8 11.9 11.1*   HCT 44.6 37.6 36.1*   MCV 93.9 93.5 96.0   RDW 15.2* 15.6* 15.9*    193 178   SEGS 79* 92* 82*   NEUTROABS 8.18* 13.43* 9.19*   LYMPHOPCT 13* 6* 12*   LYMPHSABS 1.32 0.88* 1.31   MONOPCT 7 2* 5   EOSRELPCT 1 0* 1   BASOPCT 0 0 0   IMMGRAN 0 0 0        Recent Blood Glucose:   Recent Labs     02/05/22  1105 02/06/22  0535 02/07/22  0530   GLUCOSE 90 125* 83        Comprehensive Metabolic Profile:   Recent Labs     02/05/22  1105 02/06/22  0535 02/07/22  0530   BUN 25* 23 17   CREATININE 1.05* 0.55 0.70    135 139   K 3.0* 4.5 4.4   CL 98 101 99   MG 1.8  --   --    CALCIUM 9.3 9.2 9.8   ANIONGAP 9 6* 7*   CO2 33* 28 33*   PROT 6.2*  --   --    LABALBU 3.9  --   --    BILITOT 0.52  --   --    ALKPHOS 53  --   --    AST 22  --   --    ALT 15  --   -- Urinalysis:   Lab Results   Component Value Date    NITRU NEGATIVE 10/14/2021    COLORU Yellow 10/14/2021    PHUR 6.0 10/14/2021    WBCUA None 10/14/2021    RBCUA None 10/14/2021    MUCUS NOT REPORTED 10/14/2021    TRICHOMONAS NOT REPORTED 10/14/2021    YEAST NOT REPORTED 10/14/2021    BACTERIA NOT REPORTED 10/14/2021    SPECGRAV 1.020 10/14/2021    LEUKOCYTESUR NEGATIVE 10/14/2021    UROBILINOGEN Normal 10/14/2021    BILIRUBINUR NEGATIVE 10/14/2021    GLUCOSEU NEGATIVE 10/14/2021    KETUA NEGATIVE 10/14/2021    AMORPHOUS NOT REPORTED 10/14/2021       HgBA1c:  No results found for: LABA1C    TSH:    Lab Results   Component Value Date    TSH 0.69 10/24/2020       Lactic Acid:   Lab Results   Component Value Date    LACTA 2.0 02/06/2022    LACTA 2.5 02/05/2022    LACTA 3.2 02/05/2022        High Sensitivity Troponin:  Recent Labs     02/05/22  1105 02/05/22  1210 02/06/22  1051   TROPHS 105* 108* 27*     Pro-BNP:  Lab Results   Component Value Date    PROBNP 168 02/05/2022     D-Dimer:  Lab Results   Component Value Date    DDIMER 0.69 (H) 01/18/2022     PT/INR:    Lab Results   Component Value Date    PROTIME 15.6 10/14/2021    INR 1.3 10/14/2021     PTT:    Lab Results   Component Value Date    APTT 33.9 10/14/2021       CRP: No results for input(s): CRP in the last 72 hours. ABGs:   Lab Results   Component Value Date    PHART 7.398 02/06/2022    XXU7IUD 54.3 02/06/2022    PO2ART 75.9 02/06/2022    GMC3QKQ 32.7 02/06/2022    IRN2NLK 35 11/02/2020    U7SDHPYV 95.0 02/06/2022    FIO2 NOT REPORTED 02/06/2022         Radiology/Imaging:  CT CHEST PULMONARY EMBOLISM W CONTRAST   Final Result   No evidence of pulmonary embolism. Similar extensive persistent pulmonary abnormalities/bronchitis compatible   with the history of COVID pneumonia.   Some improvement noted left base but   worsening seen on the right, mostly RLL; consider increasing viral or   possibly superimposed bacterial or other bronchopneumonia with some   consolidation. Large hiatus hernia. Similar mild mediastinal adenopathy. Additional unchanged findings, as above. RECOMMENDATIONS:   Unavailable         XR CHEST PORTABLE   Final Result   Persistent opacities most significant in the left mid lung and concerning for   unchanged multifocal pneumonia. Clinical and imaging follow-up to resolution   recommended. ASSESSMENT / PLAN:     Atypical pneumonia  · Continue current therapy  · Consults: Pulmonology  · Continue Zosyn  · Continue supplemental oxygen  · Continue prednisone taper  · Trend labs  · Elevated troponin  · Appreciate cardiology  · Continue aspirin, Lasix  · Mild CARLITA  · With IV fluids  · Chronic pain syndrome  · Patient has implantable Dilaudid infusion pump  · Continue Lyrica  · Continue Norco as needed  · Nutrition status:   · Well developed, well nourished with no malnutrition  · Dietician consult initiated  · [] NPO [] TPN      [] Tube feed [] Clear liquid        [] Full liquid [x] regular diet         [] Fluid restriction   [] Diabetic diet   · ICU Prophylaxis:   · DVT: Xarelto   · Stress Ulcer: PPI   · High risk medications: none   · Disposition:    · Transfer out of ICU:  [x] yes [] no   · Discharge plan is pending  · Total critical care time caring for this patient with life threatening, unstable organ failure, including direct patient contact, management of life support systems, review of data including imaging and labs, discussions with other team members and physicians at least 0 minutes so far today, excluding separately billable procedures.       Ryan Patience, APRN - CNP , RYAN-NP-C  2/7/2022  10:18 AM

## 2022-02-07 NOTE — PROGRESS NOTES
Piperacillin-Tazobactam Extended Interval Interchange    The following ordered dose of Piperacillin-Tazobactam has been changed to optimize its pharmacodynamic profile as approved by the Patient Care Review Committee. Ordered Dose    x__ 3.375 gm IV q6 or 8hrs (30 minute infusion)      __ 4.5gm IV q6 or 8hrs  (30 minute infusion)      __ 2.25gm IV q6 or 8hrs (30 minute infusion)      New Dose    CrCl  ? 20ml/min    x__ 3.375gm IV q8hrs  (4-hour infusion)      CrCl < 20ml/min     __ 3.375gm IV q12hrs  (4-hour infusion)      Pharmacists should be contacted for issues concerning drug compatibility with multiple IV medications. All doses will be prepared using 50ml D5W to be infused over 4-hours at a rate of 12.5ml/hr. Order was clarified with Dr Emilie Davis to be for 7 days of therapy.      Thank Megan Celis, [id number] AM

## 2022-02-07 NOTE — PROGRESS NOTES
[facility] Cardiac monitor in place.

## 2022-02-07 NOTE — PROGRESS NOTES
Physical Therapy  Facility/Department: [facility] AT THE [facility] MED SURG  Daily Treatment Note  NAME: Syl Rosario  : 1945  MRN: [id number]    Date of Service: 2022    Discharge Recommendations:  Continue to assess pending progress        Assessment   Body structures, Functions, Activity limitations: Decreased functional mobility ; Decreased safe awareness;Decreased endurance;Decreased high-level IADLs;Decreased balance;Decreased ADL status; Decreased strength  Treatment Diagnosis: generalized weakness  Prognosis: Good  Decision Making: Medium Complexity  PT Education: General Safety;Gait Training;Transfer Training;Goals  Patient Education: Educated pt on the above with good understanding noted. REQUIRES PT FOLLOW UP: Yes  Activity Tolerance  Activity Tolerance: Patient Tolerated treatment well     Patient Diagnosis(es): The encounter diagnosis was Multifocal pneumonia. has a past medical history of Chronic pain syndrome, COPD (chronic obstructive pulmonary disease) ([facility] [address] Depression, GERD (gastroesophageal reflux disease), Hypothyroidism, and Osteoporosis. has a past surgical history that includes Hysterectomy; back surgery; Breast surgery; Carpal tunnel release; joint replacement; joint replacement; fracture surgery (Left, 2018); and Wrist fracture surgery (Left, 2018). Restrictions  Restrictions/Precautions  Restrictions/Precautions: General Precautions,Fall Risk,Contact Precautions  Subjective   General  Chart Reviewed: Yes  Response To Previous Treatment: Patient with no complaints from previous session. Family / Caregiver Present: Yes  Subjective  Subjective: Pt reports pain in back and LE's 7/10 and states it is chronic pain and she has a pump implanted in her back for her chronic pain. Pt seated in chair and is agreeable to therapy. Pt is wanting to do more walking.           Orientation  Orientation  Overall Orientation Status: Within Functional Limits  Cognition      Objective Transfers  Sit to Stand: Stand by assistance  Stand to sit: Stand by assistance  Ambulation  Ambulation?: Yes  Ambulation 1  Surface: level tile  Device: Rolling Walker  Assistance: Contact guard assistance;Stand by assistance  Distance: 400 ft x1  Comments: Assist with O2 tubing and IV pole and had to cue pt to slow down to manage all of tubing. Stairs/Curb  Stairs?: No     Balance  Sitting - Static: Good  Sitting - Dynamic: Good  Standing - Static: Fair;-  Standing - Dynamic: Poor  Exercises  Comments: Seated B LE exercises x20 reps. Goals  Short term goals  Time Frame for Short term goals: 20 days  Short term goal 1: Pt will be independent with bed mob/transfers to restore functional independence. Short term goal 2: Pt will ambulate >/= 100ft with LRAD at level of SUP without LOB/unsteadiness. Short term goal 3: Pt will tolerate 20-30mins ther ex/act to improve endurance for ADLs and functional tasks. Plan    Plan  Times per week: 7 days per week  Times per day: Twice a day  Current Treatment Recommendations: Strengthening,Balance Training,Transfer Training,IADL Training,ADL/Self-care Training,Stair training,Endurance Training,Gait Training,Neuromuscular Re-education,Home Exercise Program,Patient/Caregiver Education & Training,Safety Education & Training  Safety Devices  Type of devices:  All fall risk precautions in place,Left in chair,Call light within reach,Nurse notified,Gait belt     Therapy Time   Individual Concurrent Group Co-treatment   Time In 1454         Time Out 1517         Minutes [address] [place] [address]

## 2022-02-08 LAB
ABSOLUTE EOS #: 0.11 K/UL (ref 0–0.44)
ABSOLUTE IMMATURE GRANULOCYTE: 0.04 K/UL (ref 0–0.3)
ABSOLUTE LYMPH #: 1.17 K/UL (ref 1.1–3.7)
ABSOLUTE MONO #: 0.43 K/UL (ref 0.1–1.2)
ANION GAP SERPL CALCULATED.3IONS-SCNC: 10 MMOL/L (ref 9–17)
BASOPHILS # BLD: 0 % (ref 0–2)
BASOPHILS ABSOLUTE: <0.03 K/UL (ref 0–0.2)
BUN BLDV-MCNC: 12 MG/DL (ref 8–23)
BUN/CREAT BLD: 22 (ref 9–20)
CALCIUM SERPL-MCNC: 10.5 MG/DL (ref 8.6–10.4)
CHLORIDE BLD-SCNC: 103 MMOL/L (ref 98–107)
CO2: 33 MMOL/L (ref 20–31)
CREAT SERPL-MCNC: 0.55 MG/DL (ref 0.5–0.9)
DIFFERENTIAL TYPE: ABNORMAL
EOSINOPHILS RELATIVE PERCENT: 2 % (ref 1–4)
GFR AFRICAN AMERICAN: >60 ML/MIN
GFR NON-AFRICAN AMERICAN: >60 ML/MIN
GFR SERPL CREATININE-BSD FRML MDRD: ABNORMAL ML/MIN/{1.73_M2}
GFR SERPL CREATININE-BSD FRML MDRD: ABNORMAL ML/MIN/{1.73_M2}
GLUCOSE BLD-MCNC: 142 MG/DL (ref 74–100)
GLUCOSE BLD-MCNC: 80 MG/DL (ref 70–99)
HCT VFR BLD CALC: 39.5 % (ref 36.3–47.1)
HEMOGLOBIN: 12.4 G/DL (ref 11.9–15.1)
IMMATURE GRANULOCYTES: 1 %
LYMPHOCYTES # BLD: 16 % (ref 24–43)
MCH RBC QN AUTO: 29.9 PG (ref 25.2–33.5)
MCHC RBC AUTO-ENTMCNC: 31.4 G/DL (ref 28.4–34.8)
MCV RBC AUTO: 95.2 FL (ref 82.6–102.9)
MONOCYTES # BLD: 6 % (ref 3–12)
NRBC AUTOMATED: 0 PER 100 WBC
PDW BLD-RTO: 15.5 % (ref 11.8–14.4)
PLATELET # BLD: 198 K/UL (ref 138–453)
PLATELET ESTIMATE: ABNORMAL
PMV BLD AUTO: 9.1 FL (ref 8.1–13.5)
POTASSIUM SERPL-SCNC: 4.4 MMOL/L (ref 3.7–5.3)
RBC # BLD: 4.15 M/UL (ref 3.95–5.11)
RBC # BLD: ABNORMAL 10*6/UL
SEG NEUTROPHILS: 75 % (ref 36–65)
SEGMENTED NEUTROPHILS ABSOLUTE COUNT: 5.39 K/UL (ref 1.5–8.1)
SODIUM BLD-SCNC: 146 MMOL/L (ref 135–144)
WBC # BLD: 7.2 K/UL (ref 3.5–11.3)
WBC # BLD: ABNORMAL 10*3/UL

## 2022-02-08 PROCEDURE — 94664 DEMO&/EVAL PT USE INHALER: CPT

## 2022-02-08 PROCEDURE — 6370000000 HC RX 637 (ALT 250 FOR IP): Performed by: NURSE PRACTITIONER

## 2022-02-08 PROCEDURE — 80048 BASIC METABOLIC PNL TOTAL CA: CPT

## 2022-02-08 PROCEDURE — 94761 N-INVAS EAR/PLS OXIMETRY MLT: CPT

## 2022-02-08 PROCEDURE — 94669 MECHANICAL CHEST WALL OSCILL: CPT

## 2022-02-08 PROCEDURE — 97535 SELF CARE MNGMENT TRAINING: CPT

## 2022-02-08 PROCEDURE — 2580000003 HC RX 258: Performed by: INTERNAL MEDICINE

## 2022-02-08 PROCEDURE — 97110 THERAPEUTIC EXERCISES: CPT

## 2022-02-08 PROCEDURE — 6360000002 HC RX W HCPCS: Performed by: INTERNAL MEDICINE

## 2022-02-08 PROCEDURE — 94640 AIRWAY INHALATION TREATMENT: CPT

## 2022-02-08 PROCEDURE — 36415 COLL VENOUS BLD VENIPUNCTURE: CPT

## 2022-02-08 PROCEDURE — 2700000000 HC OXYGEN THERAPY PER DAY

## 2022-02-08 PROCEDURE — 2580000003 HC RX 258: Performed by: NURSE PRACTITIONER

## 2022-02-08 PROCEDURE — 97116 GAIT TRAINING THERAPY: CPT

## 2022-02-08 PROCEDURE — 82947 ASSAY GLUCOSE BLOOD QUANT: CPT

## 2022-02-08 PROCEDURE — 1200000000 HC SEMI PRIVATE

## 2022-02-08 PROCEDURE — 85025 COMPLETE CBC W/AUTO DIFF WBC: CPT

## 2022-02-08 RX ADMIN — PANTOPRAZOLE SODIUM 40 MG: 40 TABLET, DELAYED RELEASE ORAL at 06:54

## 2022-02-08 RX ADMIN — ASPIRIN 81 MG: 81 TABLET, CHEWABLE ORAL at 08:36

## 2022-02-08 RX ADMIN — Medication 2000 UNITS: at 08:36

## 2022-02-08 RX ADMIN — Medication 1000 MG: at 22:36

## 2022-02-08 RX ADMIN — PREDNISONE 30 MG: 10 TABLET ORAL at 08:36

## 2022-02-08 RX ADMIN — HYDROCODONE BITARTRATE AND ACETAMINOPHEN 1 TABLET: 10; 325 TABLET ORAL at 13:54

## 2022-02-08 RX ADMIN — DEXTROSE MONOHYDRATE 3375 MG: 50 INJECTION, SOLUTION INTRAVENOUS at 13:50

## 2022-02-08 RX ADMIN — HYDROXYCHLOROQUINE SULFATE 200 MG: 200 TABLET ORAL at 08:36

## 2022-02-08 RX ADMIN — CALCIUM CARBONATE-VITAMIN D TAB 500 MG-200 UNIT 1 TABLET: 500-200 TAB at 06:54

## 2022-02-08 RX ADMIN — IPRATROPIUM BROMIDE AND ALBUTEROL SULFATE 1 AMPULE: 2.5; .5 SOLUTION RESPIRATORY (INHALATION) at 05:51

## 2022-02-08 RX ADMIN — DEXTROSE MONOHYDRATE 3375 MG: 50 INJECTION, SOLUTION INTRAVENOUS at 22:30

## 2022-02-08 RX ADMIN — IPRATROPIUM BROMIDE AND ALBUTEROL SULFATE 1 AMPULE: 2.5; .5 SOLUTION RESPIRATORY (INHALATION) at 21:07

## 2022-02-08 RX ADMIN — IPRATROPIUM BROMIDE AND ALBUTEROL SULFATE 1 AMPULE: 2.5; .5 SOLUTION RESPIRATORY (INHALATION) at 15:07

## 2022-02-08 RX ADMIN — DULOXETINE 60 MG: 60 CAPSULE, DELAYED RELEASE ORAL at 08:35

## 2022-02-08 RX ADMIN — GUAIFENESIN AND DEXTROMETHORPHAN 5 ML: 100; 10 SYRUP ORAL at 22:38

## 2022-02-08 RX ADMIN — CALCIUM CARBONATE-VITAMIN D TAB 500 MG-200 UNIT 1 TABLET: 500-200 TAB at 17:00

## 2022-02-08 RX ADMIN — PREGABALIN 300 MG: 75 CAPSULE ORAL at 22:36

## 2022-02-08 RX ADMIN — POTASSIUM BICARBONATE 20 MEQ: 782 TABLET, EFFERVESCENT ORAL at 22:37

## 2022-02-08 RX ADMIN — IPRATROPIUM BROMIDE AND ALBUTEROL SULFATE 1 AMPULE: 2.5; .5 SOLUTION RESPIRATORY (INHALATION) at 10:59

## 2022-02-08 RX ADMIN — Medication 1000 MG: at 08:36

## 2022-02-08 RX ADMIN — POTASSIUM BICARBONATE 20 MEQ: 782 TABLET, EFFERVESCENT ORAL at 08:35

## 2022-02-08 RX ADMIN — LEVOTHYROXINE SODIUM 150 MCG: 150 TABLET ORAL at 06:54

## 2022-02-08 RX ADMIN — PREGABALIN 300 MG: 75 CAPSULE ORAL at 08:36

## 2022-02-08 RX ADMIN — DEXTROSE MONOHYDRATE 3375 MG: 50 INJECTION, SOLUTION INTRAVENOUS at 06:45

## 2022-02-08 RX ADMIN — SODIUM CHLORIDE, PRESERVATIVE FREE 5 ML: 5 INJECTION INTRAVENOUS at 21:00

## 2022-02-08 RX ADMIN — FUROSEMIDE 20 MG: 20 TABLET ORAL at 08:36

## 2022-02-08 RX ADMIN — HYDROCODONE BITARTRATE AND ACETAMINOPHEN 1 TABLET: 10; 325 TABLET ORAL at 06:54

## 2022-02-08 RX ADMIN — TRAZODONE HYDROCHLORIDE 200 MG: 50 TABLET ORAL at 22:37

## 2022-02-08 RX ADMIN — RIVAROXABAN 20 MG: 20 TABLET, FILM COATED ORAL at 06:54

## 2022-02-08 ASSESSMENT — PAIN SCALES - GENERAL
PAINLEVEL_OUTOF10: 0
PAINLEVEL_OUTOF10: 3
PAINLEVEL_OUTOF10: 7
PAINLEVEL_OUTOF10: 6
PAINLEVEL_OUTOF10: 3

## 2022-02-08 ASSESSMENT — PAIN DESCRIPTION - PAIN TYPE
TYPE: CHRONIC PAIN
TYPE: CHRONIC PAIN

## 2022-02-08 ASSESSMENT — PAIN DESCRIPTION - LOCATION: LOCATION: BACK

## 2022-02-08 NOTE — PROGRESS NOTES
212Physical Therapy  Facility/Department: [facility] AT THE [facility] MED SURG  Daily Treatment Note  NAME: Cuate Rivas  : 1945  MRN: [id number]    Date of Service: 2022    Discharge Recommendations:  Continue to assess pending progress        Assessment   Treatment Diagnosis: generalized weakness  Prognosis: Good  Decision Making: Medium Complexity  PT Education: General Safety;Gait Training;Transfer Training;Goals  Patient Education: Educated pt on the above with good understanding noted. REQUIRES PT FOLLOW UP: Yes  Activity Tolerance  Activity Tolerance: Patient Tolerated treatment well     Patient Diagnosis(es): The encounter diagnosis was Multifocal pneumonia. has a past medical history of Chronic pain syndrome, COPD (chronic obstructive pulmonary disease) ([address] Depression, GERD (gastroesophageal reflux disease), Hypothyroidism, and Osteoporosis. has a past surgical history that includes Hysterectomy; back surgery; Breast surgery; Carpal tunnel release; joint replacement; joint replacement; fracture surgery (Left, 2018); and Wrist fracture surgery (Left, 2018). Restrictions  Restrictions/Precautions  Restrictions/Precautions: General Precautions,Fall Risk  Subjective   General  Chart Reviewed: Yes  Response To Previous Treatment: Patient with no complaints from previous session. Family / Caregiver Present: Yes  Subjective  Subjective: Pt. reports normal back pain upon arrival. Pt, egaer for therapy and to walk.           Orientation  Orientation  Overall Orientation Status: Within Functional Limits  Cognition      Objective   Bed mobility  Comment: Pt up in recliner upon arrival  Transfers  Sit to Stand: Stand by assistance;Supervision  Stand to sit: Stand by assistance;Supervision  Ambulation  Ambulation?: Yes  Ambulation 1  Surface: level tile  Device: Rolling Walker  Assistance: Stand by assistance  Gait Deviations: None  Distance: 40ftx5 (with RW) and 40'x3 (pt pushing IV pole)  Comments: Assist with O2 tubing and IV pole and had to cue pt to slow down to manage all of tubing. Exercises  Hip Flexion: x20  Hip Abduction: x20  Knee Long Arc Quad: x20  Ankle Pumps: x20  Comments: Reclined and seated exercises B LE x20     Goals  Short term goals  Time Frame for Short term goals: 20 days  Short term goal 1: Pt will be independent with bed mob/transfers to restore functional independence. Short term goal 2: Pt will ambulate >/= 100ft with LRAD at level of SUP without LOB/unsteadiness. Short term goal 3: Pt will tolerate 20-30mins ther ex/act to improve endurance for ADLs and functional tasks. Plan    Plan  Times per week: 7 days per week  Times per day: Twice a day  Current Treatment Recommendations: Strengthening,Balance Training,Transfer Training,IADL Training,ADL/Self-care Training,Stair training,Endurance Training,Gait Training,Neuromuscular Re-education,Home Exercise Program,Patient/Caregiver Education & Training,Safety Education & Training  Safety Devices  Type of devices:  All fall risk precautions in place,Left in chair,Call light within reach,Nurse notified,Gait belt     Therapy Time   Individual Concurrent Group Co-treatment   Time In 1416         Time Out 1455         Minutes [address]

## 2022-02-08 NOTE — PROGRESS NOTES
Physical Therapy  Facility/Department: [facility] AT THE [facility] MED SURG  Daily Treatment Note  NAME: Dylan Richards  : 1945  MRN: [id number]    Date of Service: 2022    Discharge Recommendations:  Continue to assess pending progress        Assessment   Body structures, Functions, Activity limitations: Decreased functional mobility ; Decreased safe awareness;Decreased endurance;Decreased high-level IADLs;Decreased balance;Decreased ADL status; Decreased strength  Treatment Diagnosis: generalized weakness  Prognosis: Good  Decision Making: Medium Complexity  PT Education: General Safety;Gait Training;Transfer Training;Goals  Patient Education: Educated pt on the above with good understanding noted. REQUIRES PT FOLLOW UP: Yes  Activity Tolerance  Activity Tolerance: Patient Tolerated treatment well     Patient Diagnosis(es): The encounter diagnosis was Multifocal pneumonia. has a past medical history of Chronic pain syndrome, COPD (chronic obstructive pulmonary disease) ([facility] [address] Depression, GERD (gastroesophageal reflux disease), Hypothyroidism, and Osteoporosis. has a past surgical history that includes Hysterectomy; back surgery; Breast surgery; Carpal tunnel release; joint replacement; joint replacement; fracture surgery (Left, 2018); and Wrist fracture surgery (Left, 2018). Restrictions  Restrictions/Precautions  Restrictions/Precautions: General Precautions,Fall Risk,Contact Precautions  Subjective   General  Chart Reviewed: Yes  Response To Previous Treatment: Patient with no complaints from previous session. Family / Caregiver Present: Yes  Subjective  Subjective: Pt. reports normal back pain upon arrival. Pt, egaer for therapy and to walk.           Orientation  Orientation  Overall Orientation Status: Within Functional Limits  Cognition      Objective      Transfers  Sit to Stand: Stand by assistance;Supervision  Stand to sit: Stand by assistance;Supervision  Ambulation  Ambulation?:

## 2022-02-08 NOTE — PROGRESS NOTES
Follow up with the patient and spouse about her needing IV medication 3 times a day on discharge. She would like to do this through [facility]. She has had [facility] last year and would like them again. Will go through [facility] for the IV medication.     [facility]

## 2022-02-08 NOTE — PROGRESS NOTES
Pt alert and oriented resting comfortably in the chair at this time. Pt remains on 1L nasal cannula with o2 sat of 93%. Vitals and assessment completed at this time. Pt denies any further needs. Will continue to monitor.

## 2022-02-08 NOTE — PROGRESS NOTES
[facility]   APRN - Progress Note    Patient - Daniela Segura  Date of Admission -  2022 10:49 AM  Date of Evaluation -  2022  Hospital Day - 3      SUBJECTIVE:     The Daniela Segura is a 77 y.o. female sitting up in the chair alert oriented no acute distress. Patient has no complaints today. She denies chest pain. States she does feel better today. ROS:   Constitutional: negative  for fevers, and negative for chills. Respiratory: positive for shortness of breath, negative for cough, and negative for wheezing  Cardiovascular: negative for chest pain, and negative for palpitations  Gastrointestinal: negative for abdominal pain, negative for nausea,negative for vomiting, negative for diarrhea, and negative for constipation    All other systems were reviewed with the patient and are negative unless otherwise stated in HPI. OBJECTIVE:     VITAL SIGNS:  Patient Vitals for the past 8 hrs:   BP Temp Temp src Pulse Resp SpO2 Weight   22 1101 -- -- -- -- -- 93 % --   22 0833 -- -- -- -- -- -- 162 lb (73.5 kg)   22 0648 126/68 97.6 °F (36.4 °C) Temporal 74 18 92 % --   22 0552 -- -- -- -- 20 94 % --         Temp: 97.6 °F (36.4 °C)  Temp range:    Temp  Av.6 °F (36.4 °C)  Min: 97.4 °F (36.3 °C)  Max: 97.8 °F (36.6 °C)    BP: 126/68  BP Range:      Systolic (99TZS), XFU:465 , Min:126 , RGS:211      Diastolic (48NBC), UHP:64, Min:68, Max:75    Pulse: 74  Pulse Range:    Pulse  Av  Min: 66  Max: 98    Resp: 18  Resp Range:   Resp  Av.5  Min: 18  Max: 20    SpO2: 93 % supplemental oxygen  SpO2 range:   SpO2  Av.7 %  Min: 92 %  Max: 98 %    Weight  Wt Readings from Last 3 Encounters:   22 162 lb (73.5 kg)   22 155 lb (70.3 kg)   22 146 lb (66.2 kg)     Body mass index is 27.81 kg/m².     24HR INTAKE/OUTPUT:      Intake/Output Summary (Last 24 hours) at 2022 1120  Last data filed at 2022 1340  Gross per 24 hour   Intake 540 ml Output --   Net 540 ml           PHYSICAL EXAM:  GEN:    Awake and following commands:     [] No   [x] Yes  MENTAL STATUS: alert and oriented x3. DISTRESS: Acute respiratory distress:       [x] No   [] Yes  EYES:  EOMI, pupils equal   NECK: Supple. No lymphadenopathy. No carotid bruit  CVS:    regular rate and rhythm, no audible murmur  PULM:  diminished with Scattered rhonchi, no acute respiratory distress  ABD:    Bowels sounds normal.  Abdomen is soft. No distention. no tenderness to palpation. EXT:   no edema bilaterally . No calf tenderness. NEURO: Moves all extremities. Motor and sensory are grossly intact  SKIN:  No rashes. No skin lesions.           MEDICATIONS:  Scheduled Meds:   piperacillin-tazobactam  3,375 mg IntraVENous Q8H    ipratropium-albuterol  1 ampule Inhalation 4x daily    predniSONE  30 mg Oral Daily    Followed by   Fox Vanessa ON 2/12/2022] predniSONE  20 mg Oral Daily    Followed by   Fox Vanessa ON 2/17/2022] predniSONE  10 mg Oral Daily    Followed by   Fox Vanessa ON 2/22/2022] predniSONE  5 mg Oral Daily    levothyroxine  150 mcg Oral Daily    aspirin  81 mg Oral Daily    DULoxetine  60 mg Oral Daily    traZODone  200 mg Oral Nightly    pantoprazole  40 mg Oral QAM AC    oyster shell calcium w/D  1 tablet Oral BID WC    pregabalin  300 mg Oral BID    ascorbic acid  1,000 mg Oral BID    Vitamin D  2,000 Units Oral Daily    rivaroxaban  20 mg Oral Daily with breakfast    furosemide  20 mg Oral Daily    hydroxychloroquine  200 mg Oral Daily    sodium chloride flush  5-40 mL IntraVENous 2 times per day    potassium bicarb-citric acid  20 mEq Oral BID     Continuous Infusions:   sodium chloride       PRN Meds:   guaiFENesin-dextromethorphan, 5 mL, Q4H PRN  HYDROcodone-acetaminophen, 1 tablet, Q6H PRN  sodium chloride flush, 5-40 mL, PRN  sodium chloride, 25 mL, PRN  ondansetron, 4 mg, Q8H PRN   Or  ondansetron, 4 mg, Q6H PRN  polyethylene glycol, 17 g, Daily PRN  acetaminophen, 650 mg, Q6H PRN   Or  acetaminophen, 650 mg, Q6H PRN  albuterol, 2.5 mg, Q2H PRN  albuterol sulfate HFA, 2 puff, Q4H PRN        DATA:  Complete Blood Count:   Recent Labs     02/06/22  0535 02/07/22  0530 02/08/22  0600   WBC 14.6* 11.3 7.2   RBC 4.02 3.76* 4.15   HGB 11.9 11.1* 12.4   HCT 37.6 36.1* 39.5   MCV 93.5 96.0 95.2   RDW 15.6* 15.9* 15.5*    178 198   SEGS 92* 82* 75*   NEUTROABS 13.43* 9.19* 5.39   LYMPHOPCT 6* 12* 16*   LYMPHSABS 0.88* 1.31 1.17   MONOPCT 2* 5 6   EOSRELPCT 0* 1 2   BASOPCT 0 0 0   IMMGRAN 0 0 1*        Recent Blood Glucose:   Recent Labs     02/06/22  0535 02/07/22  0530 02/08/22  0600   GLUCOSE 125* 83 80        Comprehensive Metabolic Profile:   Recent Labs     02/06/22  0535 02/07/22  0530 02/08/22  0600   BUN 23 17 12   CREATININE 0.55 0.70 0.55    139 146*   K 4.5 4.4 4.4    99 103   CALCIUM 9.2 9.8 10.5*   ANIONGAP 6* 7* 10   CO2 28 33* 33*        Urinalysis:   Lab Results   Component Value Date    NITRU NEGATIVE 10/14/2021    COLORU Yellow 10/14/2021    PHUR 6.0 10/14/2021    WBCUA None 10/14/2021    RBCUA None 10/14/2021    MUCUS NOT REPORTED 10/14/2021    TRICHOMONAS NOT REPORTED 10/14/2021    YEAST NOT REPORTED 10/14/2021    BACTERIA NOT REPORTED 10/14/2021    SPECGRAV 1.020 10/14/2021    LEUKOCYTESUR NEGATIVE 10/14/2021    UROBILINOGEN Normal 10/14/2021    BILIRUBINUR NEGATIVE 10/14/2021    GLUCOSEU NEGATIVE 10/14/2021    KETUA NEGATIVE 10/14/2021    AMORPHOUS NOT REPORTED 10/14/2021       HgBA1c:  No results found for: LABA1C    TSH:    Lab Results   Component Value Date    TSH 0.69 10/24/2020       Lactic Acid:   Lab Results   Component Value Date    LACTA 2.0 02/06/2022    LACTA 2.5 02/05/2022    LACTA 3.2 02/05/2022        High Sensitivity Troponin:  Recent Labs     02/05/22  1210 02/06/22  1051   TROPHS 108* 27*     Pro-BNP:  Lab Results   Component Value Date    PROBNP 168 02/05/2022     D-Dimer:  Lab Results   Component Value Date    DDIMER 0.69 (H) 01/18/2022     PT/INR:    Lab Results   Component Value Date    PROTIME 15.6 10/14/2021    INR 1.3 10/14/2021     PTT:    Lab Results   Component Value Date    APTT 33.9 10/14/2021       CRP: No results for input(s): CRP in the last 72 hours. ABGs:   Lab Results   Component Value Date    PHART 7.398 02/06/2022    ZYF0YQH 54.3 02/06/2022    PO2ART 75.9 02/06/2022    NRL6WYD 32.7 02/06/2022    FQL2TQA 35 11/02/2020    O5IOBICA 95.0 02/06/2022    FIO2 NOT REPORTED 02/06/2022         Radiology/Imaging:  CT CHEST PULMONARY EMBOLISM W CONTRAST   Final Result   No evidence of pulmonary embolism. Similar extensive persistent pulmonary abnormalities/bronchitis compatible   with the history of COVID pneumonia. Some improvement noted left base but   worsening seen on the right, mostly RLL; consider increasing viral or   possibly superimposed bacterial or other bronchopneumonia with some   consolidation. Large hiatus hernia. Similar mild mediastinal adenopathy. Additional unchanged findings, as above. RECOMMENDATIONS:   Unavailable         XR CHEST PORTABLE   Final Result   Persistent opacities most significant in the left mid lung and concerning for   unchanged multifocal pneumonia. Clinical and imaging follow-up to resolution   recommended.                ASSESSMENT / PLAN:     Atypical pneumonia  · Continue current therapy  · Consults: Pulmonology  · Continue Zosyn  · Continue supplemental oxygen  · Continue prednisone taper  · Trend labs  · Elevated troponin  · Appreciate cardiology  · Continue aspirin, Lasix  · Mild CARLITA  · With IV fluids  · Chronic pain syndrome  · Patient has implantable Dilaudid infusion pump  · Continue Lyrica  · Continue Norco as needed  · Nutrition status:   · Well developed, well nourished with no malnutrition  · Dietician consult initiated  · [] NPO [] TPN      [] Tube feed [] Clear liquid        [] Full liquid [x] regular diet         [] Fluid restriction   [] Diabetic diet   · Prophylaxis:   · DVT: Xarelto   · Stress Ulcer: PPI   · High risk medications: none   · Disposition:    · Discharge plan is pending      RYAN Fang - ADRI , RYAN-NP-C  2/8/2022  11:20 AM

## 2022-02-08 NOTE — PROGRESS NOTES
Physical Therapy  Facility/Department: [facility] AT THE [facility] MED SURG  Daily Treatment Note  NAME: Libby Mcclendon  : 1945  MRN: [id number]    Date of Service: 2022    Discharge Recommendations:  Continue to assess pending progress        Assessment   Body structures, Functions, Activity limitations: Decreased functional mobility ; Decreased safe awareness;Decreased endurance;Decreased high-level IADLs;Decreased balance;Decreased ADL status; Decreased strength  Treatment Diagnosis: generalized weakness  Prognosis: Good  Decision Making: Medium Complexity  PT Education: General Safety;Gait Training;Transfer Training;Goals  Patient Education: Educated pt on the above with good understanding noted. REQUIRES PT FOLLOW UP: Yes  Activity Tolerance  Activity Tolerance: Patient Tolerated treatment well     Patient Diagnosis(es): The encounter diagnosis was Multifocal pneumonia. has a past medical history of Chronic pain syndrome, COPD (chronic obstructive pulmonary disease) ([facility] [address] Depression, GERD (gastroesophageal reflux disease), Hypothyroidism, and Osteoporosis. has a past surgical history that includes Hysterectomy; back surgery; Breast surgery; Carpal tunnel release; joint replacement; joint replacement; fracture surgery (Left, 2018); and Wrist fracture surgery (Left, 2018). Restrictions  Restrictions/Precautions  Restrictions/Precautions: General Precautions,Fall Risk,Contact Precautions  Subjective   General  Chart Reviewed: Yes  Response To Previous Treatment: Patient with no complaints from previous session. Family / Caregiver Present: Yes  Subjective  Subjective: Pt. reports normal back pain upon arrival. Pt, egaer for therapy and to walk.           Orientation  Orientation  Overall Orientation Status: Within Functional Limits  Cognition      Objective      Transfers  Sit to Stand: Stand by assistance;Supervision  Stand to sit: Stand by assistance;Supervision  Ambulation  Ambulation?: Yes  Ambulation 1  Surface: level tile  Device: Rolling Walker  Assistance: Stand by assistance  Distance: 40ftx5  Comments: Assist with O2 tubing and IV pole and had to cue pt to slow down to manage all of tubing. Stairs/Curb  Stairs?: No        Exercises  Comments: Reclined and seated exercises B LE x20     Goals  Short term goals  Time Frame for Short term goals: 20 days  Short term goal 1: Pt will be independent with bed mob/transfers to restore functional independence. Short term goal 2: Pt will ambulate >/= 100ft with LRAD at level of SUP without LOB/unsteadiness. Short term goal 3: Pt will tolerate 20-30mins ther ex/act to improve endurance for ADLs and functional tasks. Plan    Plan  Times per week: 7 days per week  Times per day: Twice a day  Current Treatment Recommendations: Strengthening,Balance Training,Transfer Training,IADL Training,ADL/Self-care Training,Stair training,Endurance Training,Gait Training,Neuromuscular Re-education,Home Exercise Program,Patient/Caregiver Education & Training,Safety Education & Training  Safety Devices  Type of devices:  All fall risk precautions in place,Left in chair,Call light within reach,Nurse notified,Gait belt     Therapy Time   Individual Concurrent Group Co-treatment   Time In 0927         Time Out 0950         Minutes [address], [place]

## 2022-02-08 NOTE — PROGRESS NOTES
RESPIRATORY ASSESSMENT PROTOCOL                                                                                              Patient Name: Renato Ordoñez Room#: 1614/5729-01 : 1945     Admitting diagnosis: Atypical pneumonia [J18.9]  Multifocal pneumonia [J18.9]       Medical History:   Past Medical History:   Diagnosis Date    Chronic pain syndrome     dilaudid infusion pump    COPD (chronic obstructive pulmonary disease) ([facility] [address]     Depression     GERD (gastroesophageal reflux disease)     Hypothyroidism     Osteoporosis        PATIENT ASSESSMENT    LABORATORY DATA  Hematology:   Lab Results   Component Value Date    WBC 7.2 2022    RBC 4.15 2022    HGB 12.4 2022    HCT 39.5 2022     2022     Chemistry:    Lab Results   Component Value Date    PHART 7.398 2022    JUC4BPN 54.3 2022    PO2ART 75.9 2022    T2MTYBUR 95.0 2022    XNW2HUG 32.7 2022    PBEA 6.2 2022       VITALS  Pulse: 94   Resp: 20  BP: 134/71  SpO2: 91 % O2 Device: Nasal cannula  Temp: 98 °F (36.7 °C)    SKIN COLOR  [x] Normal  [] Pale  [] Dusky  [] Cyanotic    RESPIRATORY PATTERN  [x] Normal  [] Dyspnea  [] Cheyne-Granger  [] Kussmaul  [] Biots    AMBULATORY  [] Yes  [] No  [x] With Assistance    PEAK FLOW  Predicted:     Personal Best:       VITAL CAPACITY  Predicted value:  ml  Actual Value:  ml  30% of Predicted:  ml  Patient Acuity 0 1 2 3 4 Score   Level of Concious (LOC) [x]  Alert & Oriented or Pt normal LOC []  Confused;follows directions []  Confused & uncooper-ative []  Obtunded []  Comatose 0   Respiratory Rate  (RR) [x]  Reg. rate & pattern. 12 - 20 bpm  []  Increased RR.  Greater than 20 bpm   []  SOB w/ exertion or RR greater than 24 bpm []  Access- ory muscle use at rest. Abn.  resp. []  SOB at rest.   0   Bilateral Breath Sounds (BBS) []  Clear []  Diminish-ed bases  []  Diminish-ed t/o, or rales   [x]  Sporadic, scattered wheezes or rhonchi []  Persistentwheezes and, or absent BBS 3   Cough [x]  Strong, effective, & non-prod. []  Effective & prod. Less than 25 ml (2 TBSP) over past 24 hrs []  Ineffective & non-prod to less than 25 ML over past 24 hrs []  Ineffective and, or greater than 25 ml sputum prod. past 24 hrs. []  Nonspon- taneous; Requires suctioning 0   Pulmonary History  (PULM HX) []  No smoking and no chronic pulmonaryhistory []  Former smoker. Quit over 12 mos. ago []  Current smoker or quit w/ in 12 mos []  Pulm. History and, or 20 pk/yr smoking hx [x]  Admitted w/ acute pulm. dx and, or has been admitted w/ pulm. dx 2 or more times over past 12 mos 4   Surgical History this Admit  (SURG HX) [x]  No surgery []  General surgery []  Lower abdominal []  Thoracic or upper abdominal   []  Thoracic w/ pulm. disease 0   Chest X-Ray (CXR)/CT Scan []  Clear or not applicable []  Not available []  Atelect- asis or pleural effusions [x]  Localized infiltrate or pulm. edema []  Con-solidated Infiltrates, bilateral, or in more than 1 lobe 3   Slow or Forced VC, FEV1 OR PEFR (PULM FXN)  [x]  80% or greater, or not indicated []  Pt. unable to perform []  FEV1 or PEFR or VC 51-79%. []  FEV1 or PEFR or VC  30-49%   []  FEV1 or PEFR or VC less than 30%   0   TOTAL ACUITY: 10       CARE PLAN    If Acuity Level is 2, 3, or 4 in any of the following:    [] BILATERAL BREATH SOUNDS (BBS)     [x] PULMONARY HISTORY (PULM HX)  [] PULMONARY FUNCTION (PULM FX)    Goal: Improve respiratory functions in patients with airway disease and decrease WOB    [x] AEROSOL PROTOCOL    Total Acuity:   16-32  []  Secondary Assessment in 24 hrs Total Acuity:  9-15  [x]  Secondary Assessment in 24 hrs Total Acuity:  4-8  []  Secondary Assessment in 48 hrs Total Acuity:  0-3  []  Secondary Assessment in 72 hrs   HHN AEROSOL THERAPY with  [physician-ordered bronchodilator(s)] q 4 & Albuterol PRN q2 hrs. Breath-Actuated Neb if BBS Acuity = 4, and pt. can use MP.  Notify physician if condition deteriorates. HHN AEROSOL THERAPY with  [physician-ordered bronchodilator(s)]  QID and Albuterol PRN q4 hrs. Breath-Actuated Neb if BBS Acuity = 4, and pt. can use MP. Notify physician if condition deteriorates. MDI THERAPY with  2 actuations of [physician-ordered bronchodilator(s)] via spacer TID Albuterol and PRNq4 hrs. If unable to utilize MDI: HHN [physician-ordered bronchodilator(s)] TID and Albuterol PRN q4 hrs. Notify physician if condition deteriorates. MDI THERAPY with  [physician-ordered bronchodilator(s)] via spacer TID PRN. If unable to utilize MDI: HHN [physician-ordered bronchodilator(s)] TID PRN. Notify physician if condition deteriorates. If Acuity Level is 2, 3, or 4 in any of the following:    [] COUGH     [] SURGICAL HISTORY (SURG HX)  [] CHEST XRAY (CXR)    Goal: Improvement in sputum mobilization in patients with ineffective airway clearance. Reverse atelectasis. [] Bronchopulmonary Hygiene Protocol    Total Acuity:   16-32  []  Secondary Assessment in 24 hrs Total Acuity:  9-15  []  Secondary Assessment in 24 hrs Total Acuity:  4-8  []  Secondary Assessment in 48 hrs Total Acuity:  0-3  []  Secondary Assessment in 72 hrs   METANEB QID with [physician-ordered bronchodilator(s)] if CXR Acuity = 4; otherwise:  PD&P, PEP, or Vest QID & PRN  NT Sxn PRN for ineffective cough  METANEB QID with [physician-ordered bronchodilator(s)] if CXR Acuity = 4; otherwise:  PD&P, PEP, or Vest TID & PRN  NT Sxn PRN for ineffective cough  Instruct patient to self-perform IS q1hr WA   Directed Cough self-performed q1hr WA     If Acuity Level is 2 or above in the following:    [] PULMONARY HISTORY (PULM HX)    Goal: Assist patient in quitting smoking to slow or stop the progression of lung disease.     [] Smoking Cessation Protocol    SMOKING CESSATION EDUCATION provided according to policy OL_045: (marlyn with an X)  ____Yes    ____ No     ____ NA    Smoking Cessation Booklet given:  ____Yes  ____No ____Patient Roopa Stevenson

## 2022-02-08 NOTE — PROGRESS NOTES
Occupational Therapy  Facility/Department: [facility] MED SURG  Daily Treatment Note  NAME: Marco Mack  : 1945  MRN: [id number]    Date of Service: 2022    Discharge Recommendations:  Continue to assess pending progress,Home with assist PRN,Subacute/Skilled Nursing Facility       Assessment      OT Education: ADL Adaptive Strategies  Patient Education: Pt educated on breathing technique and energy conservation  Activity Tolerance  Activity Tolerance: Patient limited by fatigue  Safety Devices  Type of devices: All fall risk precautions in place; Left in chair;Call light within reach;Nurse notified         Patient Diagnosis(es): The encounter diagnosis was Multifocal pneumonia. has a past medical history of Chronic pain syndrome, COPD (chronic obstructive pulmonary disease) ([facility] [address] Depression, GERD (gastroesophageal reflux disease), Hypothyroidism, and Osteoporosis. has a past surgical history that includes Hysterectomy; back surgery; Breast surgery; Carpal tunnel release; joint replacement; joint replacement; fracture surgery (Left, 2018); and Wrist fracture surgery (Left, 2018). Restrictions  Restrictions/Precautions  Restrictions/Precautions: General Precautions,Fall Risk  Subjective   General  Chart Reviewed: Yes  Patient assessed for rehabilitation services?: Yes  Response to previous treatment: Patient with no complaints from previous session  Family / Caregiver Present: Yes  Referring Practitioner: Tia Cancer  Diagnosis: Aypical Pneumonia  Subjective  Subjective: Pt has no complaints of pain a this time. General Comment  Comments: Pt sitting in bedside chair, ageeable to OT [place] pt denies ADL at this time      Orientation     Objective    ADL  Grooming:  (Pt requested to wash face during session.  Pt completed in bedside chair)                                                           Type of ROM/Therapeutic Exercise  Comment: pt completed BUE 1# free weight in 6 planes x 20 reps x 1 set, green digiflex x 20 each hand, yelloe flex bar bends x 20. RBs as needed. SPO2 stayed in mid 90s throughout session                    Plan   Plan  Times per week: 7  Times per day: Daily  Current Treatment Recommendations: Strengthening,Safety Education & Training,ROM,Balance Training,Patient/Caregiver Education & Training,Self-Care / ADL,Functional Mobility Training,Endurance Training  G-Code     OutComes Score                                                  AM-PAC Score             Goals  Short term goals  Time Frame for Short term goals: 21 visits  Short term goal 1: Pt will tolerate >5 mintues of BUE ther ex with no more than 2 RBs to improve functional strength for ADLs. Short term goal 2: Patient will complete functional mobility activity for ADL tasks with SBA using LRAD to increase safety and independence. Short term goal 3: Pt will complete basic self-care standing sinkside with SBA to increase standing tolerance and balance for ADLs.        Therapy Time   Individual Concurrent Group Co-treatment   Time In 1025         Time Out 1052         Minutes [address] [id number], [facility]

## 2022-02-08 NOTE — PROGRESS NOTES
Patient awake and up in the chair. C/O chronic back pain, see mar for med details. Other morning meds given. IV antibiotics started. Coffee provided. Denies any other needs. Will continue to monitor.

## 2022-02-09 LAB
ABSOLUTE EOS #: 0.07 K/UL (ref 0–0.44)
ABSOLUTE IMMATURE GRANULOCYTE: 0.03 K/UL (ref 0–0.3)
ABSOLUTE LYMPH #: 1.68 K/UL (ref 1.1–3.7)
ABSOLUTE MONO #: 0.4 K/UL (ref 0.1–1.2)
ANION GAP SERPL CALCULATED.3IONS-SCNC: 9 MMOL/L (ref 9–17)
BASOPHILS # BLD: 0 % (ref 0–2)
BASOPHILS ABSOLUTE: <0.03 K/UL (ref 0–0.2)
BUN BLDV-MCNC: 11 MG/DL (ref 8–23)
BUN/CREAT BLD: 20 (ref 9–20)
CALCIUM SERPL-MCNC: 10.3 MG/DL (ref 8.6–10.4)
CHLORIDE BLD-SCNC: 101 MMOL/L (ref 98–107)
CO2: 32 MMOL/L (ref 20–31)
CREAT SERPL-MCNC: 0.55 MG/DL (ref 0.5–0.9)
DIFFERENTIAL TYPE: ABNORMAL
EOSINOPHILS RELATIVE PERCENT: 2 % (ref 1–4)
GFR AFRICAN AMERICAN: >60 ML/MIN
GFR NON-AFRICAN AMERICAN: >60 ML/MIN
GFR SERPL CREATININE-BSD FRML MDRD: ABNORMAL ML/MIN/{1.73_M2}
GFR SERPL CREATININE-BSD FRML MDRD: ABNORMAL ML/MIN/{1.73_M2}
GLUCOSE BLD-MCNC: 81 MG/DL (ref 70–99)
HCT VFR BLD CALC: 37.8 % (ref 36.3–47.1)
HEMOGLOBIN: 11.7 G/DL (ref 11.9–15.1)
IMMATURE GRANULOCYTES: 1 %
LYMPHOCYTES # BLD: 36 % (ref 24–43)
MCH RBC QN AUTO: 29.3 PG (ref 25.2–33.5)
MCHC RBC AUTO-ENTMCNC: 31 G/DL (ref 28.4–34.8)
MCV RBC AUTO: 94.5 FL (ref 82.6–102.9)
MONOCYTES # BLD: 9 % (ref 3–12)
NRBC AUTOMATED: 0 PER 100 WBC
PDW BLD-RTO: 15.4 % (ref 11.8–14.4)
PLATELET # BLD: 194 K/UL (ref 138–453)
PLATELET ESTIMATE: ABNORMAL
PMV BLD AUTO: 9.5 FL (ref 8.1–13.5)
POTASSIUM SERPL-SCNC: 3.9 MMOL/L (ref 3.7–5.3)
RBC # BLD: 4 M/UL (ref 3.95–5.11)
RBC # BLD: ABNORMAL 10*6/UL
SEG NEUTROPHILS: 53 % (ref 36–65)
SEGMENTED NEUTROPHILS ABSOLUTE COUNT: 2.49 K/UL (ref 1.5–8.1)
SODIUM BLD-SCNC: 142 MMOL/L (ref 135–144)
WBC # BLD: 4.7 K/UL (ref 3.5–11.3)
WBC # BLD: ABNORMAL 10*3/UL

## 2022-02-09 PROCEDURE — 6370000000 HC RX 637 (ALT 250 FOR IP): Performed by: NURSE PRACTITIONER

## 2022-02-09 PROCEDURE — 6360000002 HC RX W HCPCS: Performed by: INTERNAL MEDICINE

## 2022-02-09 PROCEDURE — 97116 GAIT TRAINING THERAPY: CPT

## 2022-02-09 PROCEDURE — 97530 THERAPEUTIC ACTIVITIES: CPT

## 2022-02-09 PROCEDURE — 1200000000 HC SEMI PRIVATE

## 2022-02-09 PROCEDURE — 94664 DEMO&/EVAL PT USE INHALER: CPT

## 2022-02-09 PROCEDURE — 36415 COLL VENOUS BLD VENIPUNCTURE: CPT

## 2022-02-09 PROCEDURE — 97110 THERAPEUTIC EXERCISES: CPT

## 2022-02-09 PROCEDURE — 94669 MECHANICAL CHEST WALL OSCILL: CPT

## 2022-02-09 PROCEDURE — 94761 N-INVAS EAR/PLS OXIMETRY MLT: CPT

## 2022-02-09 PROCEDURE — 85025 COMPLETE CBC W/AUTO DIFF WBC: CPT

## 2022-02-09 PROCEDURE — 2580000003 HC RX 258: Performed by: NURSE PRACTITIONER

## 2022-02-09 PROCEDURE — 80048 BASIC METABOLIC PNL TOTAL CA: CPT

## 2022-02-09 PROCEDURE — 6370000000 HC RX 637 (ALT 250 FOR IP): Performed by: INTERNAL MEDICINE

## 2022-02-09 PROCEDURE — 2700000000 HC OXYGEN THERAPY PER DAY

## 2022-02-09 PROCEDURE — 94640 AIRWAY INHALATION TREATMENT: CPT

## 2022-02-09 PROCEDURE — 2580000003 HC RX 258: Performed by: INTERNAL MEDICINE

## 2022-02-09 RX ORDER — GUAIFENESIN/DEXTROMETHORPHAN 100-10MG/5
5 SYRUP ORAL
Status: DISCONTINUED | OUTPATIENT
Start: 2022-02-09 | End: 2022-02-10 | Stop reason: HOSPADM

## 2022-02-09 RX ORDER — PANTOPRAZOLE SODIUM 40 MG/1
40 TABLET, DELAYED RELEASE ORAL
Status: DISCONTINUED | OUTPATIENT
Start: 2022-02-09 | End: 2022-02-10 | Stop reason: HOSPADM

## 2022-02-09 RX ORDER — ACETYLCYSTEINE 200 MG/ML
600 SOLUTION ORAL; RESPIRATORY (INHALATION) 2 TIMES DAILY
Status: COMPLETED | OUTPATIENT
Start: 2022-02-09 | End: 2022-02-09

## 2022-02-09 RX ORDER — FUROSEMIDE 10 MG/ML
40 INJECTION INTRAMUSCULAR; INTRAVENOUS 2 TIMES DAILY
Status: DISCONTINUED | OUTPATIENT
Start: 2022-02-09 | End: 2022-02-10 | Stop reason: HOSPADM

## 2022-02-09 RX ADMIN — PREGABALIN 300 MG: 75 CAPSULE ORAL at 08:34

## 2022-02-09 RX ADMIN — ASPIRIN 81 MG: 81 TABLET, CHEWABLE ORAL at 08:35

## 2022-02-09 RX ADMIN — DEXTROSE MONOHYDRATE 3375 MG: 50 INJECTION, SOLUTION INTRAVENOUS at 22:30

## 2022-02-09 RX ADMIN — FUROSEMIDE 20 MG: 20 TABLET ORAL at 08:35

## 2022-02-09 RX ADMIN — ACETYLCYSTEINE 600 MG: 200 SOLUTION ORAL; RESPIRATORY (INHALATION) at 10:13

## 2022-02-09 RX ADMIN — IPRATROPIUM BROMIDE AND ALBUTEROL SULFATE 1 AMPULE: 2.5; .5 SOLUTION RESPIRATORY (INHALATION) at 05:40

## 2022-02-09 RX ADMIN — HYDROXYCHLOROQUINE SULFATE 200 MG: 200 TABLET ORAL at 08:35

## 2022-02-09 RX ADMIN — Medication 1000 MG: at 21:50

## 2022-02-09 RX ADMIN — TRAZODONE HYDROCHLORIDE 200 MG: 50 TABLET ORAL at 21:51

## 2022-02-09 RX ADMIN — DEXTROSE MONOHYDRATE 3375 MG: 50 INJECTION, SOLUTION INTRAVENOUS at 13:58

## 2022-02-09 RX ADMIN — PREDNISONE 30 MG: 10 TABLET ORAL at 08:35

## 2022-02-09 RX ADMIN — LEVOTHYROXINE SODIUM 150 MCG: 150 TABLET ORAL at 07:03

## 2022-02-09 RX ADMIN — DEXTROSE MONOHYDRATE 3375 MG: 50 INJECTION, SOLUTION INTRAVENOUS at 05:37

## 2022-02-09 RX ADMIN — FUROSEMIDE 40 MG: 10 INJECTION, SOLUTION INTRAMUSCULAR; INTRAVENOUS at 09:58

## 2022-02-09 RX ADMIN — IPRATROPIUM BROMIDE AND ALBUTEROL SULFATE 1 AMPULE: 2.5; .5 SOLUTION RESPIRATORY (INHALATION) at 16:10

## 2022-02-09 RX ADMIN — PANTOPRAZOLE SODIUM 40 MG: 40 TABLET, DELAYED RELEASE ORAL at 17:14

## 2022-02-09 RX ADMIN — ACETYLCYSTEINE 600 MG: 200 SOLUTION ORAL; RESPIRATORY (INHALATION) at 19:58

## 2022-02-09 RX ADMIN — HYDROCODONE BITARTRATE AND ACETAMINOPHEN 1 TABLET: 10; 325 TABLET ORAL at 14:01

## 2022-02-09 RX ADMIN — CALCIUM CARBONATE-VITAMIN D TAB 500 MG-200 UNIT 1 TABLET: 500-200 TAB at 07:03

## 2022-02-09 RX ADMIN — GUAIFENESIN AND DEXTROMETHORPHAN 5 ML: 100; 10 SYRUP ORAL at 17:11

## 2022-02-09 RX ADMIN — DULOXETINE 60 MG: 60 CAPSULE, DELAYED RELEASE ORAL at 08:35

## 2022-02-09 RX ADMIN — PANTOPRAZOLE SODIUM 40 MG: 40 TABLET, DELAYED RELEASE ORAL at 07:03

## 2022-02-09 RX ADMIN — FUROSEMIDE 40 MG: 10 INJECTION, SOLUTION INTRAMUSCULAR; INTRAVENOUS at 17:11

## 2022-02-09 RX ADMIN — POTASSIUM BICARBONATE 20 MEQ: 782 TABLET, EFFERVESCENT ORAL at 21:51

## 2022-02-09 RX ADMIN — CALCIUM CARBONATE-VITAMIN D TAB 500 MG-200 UNIT 1 TABLET: 500-200 TAB at 17:11

## 2022-02-09 RX ADMIN — PREGABALIN 300 MG: 75 CAPSULE ORAL at 21:50

## 2022-02-09 RX ADMIN — IPRATROPIUM BROMIDE AND ALBUTEROL SULFATE 1 AMPULE: 2.5; .5 SOLUTION RESPIRATORY (INHALATION) at 19:45

## 2022-02-09 RX ADMIN — HYDROCODONE BITARTRATE AND ACETAMINOPHEN 1 TABLET: 10; 325 TABLET ORAL at 07:03

## 2022-02-09 RX ADMIN — POTASSIUM BICARBONATE 20 MEQ: 782 TABLET, EFFERVESCENT ORAL at 08:35

## 2022-02-09 RX ADMIN — IPRATROPIUM BROMIDE AND ALBUTEROL SULFATE 1 AMPULE: 2.5; .5 SOLUTION RESPIRATORY (INHALATION) at 10:05

## 2022-02-09 RX ADMIN — RIVAROXABAN 20 MG: 20 TABLET, FILM COATED ORAL at 07:03

## 2022-02-09 RX ADMIN — HYDROCODONE BITARTRATE AND ACETAMINOPHEN 1 TABLET: 10; 325 TABLET ORAL at 21:50

## 2022-02-09 RX ADMIN — GUAIFENESIN AND DEXTROMETHORPHAN 5 ML: 100; 10 SYRUP ORAL at 13:55

## 2022-02-09 RX ADMIN — GUAIFENESIN AND DEXTROMETHORPHAN 5 ML: 100; 10 SYRUP ORAL at 09:58

## 2022-02-09 RX ADMIN — SODIUM CHLORIDE, PRESERVATIVE FREE 5 ML: 5 INJECTION INTRAVENOUS at 21:00

## 2022-02-09 RX ADMIN — GUAIFENESIN AND DEXTROMETHORPHAN 5 ML: 100; 10 SYRUP ORAL at 21:49

## 2022-02-09 RX ADMIN — Medication 1000 MG: at 08:35

## 2022-02-09 RX ADMIN — Medication 2000 UNITS: at 08:35

## 2022-02-09 ASSESSMENT — PAIN SCALES - GENERAL
PAINLEVEL_OUTOF10: 4
PAINLEVEL_OUTOF10: 4
PAINLEVEL_OUTOF10: 6
PAINLEVEL_OUTOF10: 7
PAINLEVEL_OUTOF10: 0
PAINLEVEL_OUTOF10: 5

## 2022-02-09 ASSESSMENT — PAIN DESCRIPTION - LOCATION
LOCATION: BACK
LOCATION: BACK

## 2022-02-09 ASSESSMENT — PAIN DESCRIPTION - PAIN TYPE
TYPE: CHRONIC PAIN
TYPE: CHRONIC PAIN

## 2022-02-09 NOTE — PROGRESS NOTES
Physical Therapy  Facility/Department: [facility] AT THE [facility] MED SURG  Daily Treatment Note  NAME: Edyta Ferguson  : 1945  MRN: [id number]    Date of Service: 2022    Discharge Recommendations:  Continue to assess pending progress        Assessment   Treatment Diagnosis: generalized weakness  Prognosis: Good  Decision Making: Medium Complexity  PT Education: General Safety;Gait Training;Transfer Training;Goals  Patient Education: Educated pt on the above with good understanding noted. REQUIRES PT FOLLOW UP: Yes  Activity Tolerance  Activity Tolerance: Patient Tolerated treatment well     Patient Diagnosis(es): The encounter diagnosis was Multifocal pneumonia. has a past medical history of Chronic pain syndrome, COPD (chronic obstructive pulmonary disease) ([facility] [address] Depression, GERD (gastroesophageal reflux disease), Hypothyroidism, and Osteoporosis. has a past surgical history that includes Hysterectomy; back surgery; Breast surgery; Carpal tunnel release; joint replacement; joint replacement; fracture surgery (Left, 2018); and Wrist fracture surgery (Left, 2018). Restrictions  Restrictions/Precautions  Restrictions/Precautions: General Precautions,Fall Risk  Subjective   General  Chart Reviewed: Yes  Response To Previous Treatment: Patient with no complaints from previous session. Family / Caregiver Present: No  Subjective  Subjective: Pt pleasant agreeable for treatment.  Pt reports that she did not sleep well last night          Orientation  Orientation  Overall Orientation Status: Within Functional Limits  Cognition      Objective   Bed mobility  Comment: Pt up in recliner upon arrival  Transfers  Sit to Stand: Stand by assistance;Supervision  Stand to sit: Stand by assistance;Supervision  Ambulation  Ambulation?: Yes  Ambulation 1  Surface: level tile  Device: No Device (Pt pushing IV pole)  Other Apparatus: O2  Assistance: Stand by assistance  Distance: 40ftx5  Comments: Assist with O2 tubing        Exercises  Straight Leg Raise: x20  Heelslides: x20  Hip Flexion: x20  Hip Abduction: x20  Knee Long Arc Quad: x20  Ankle Pumps: x20  Comments: Reclined and seated exercises B LE x20     Goals  Short term goals  Time Frame for Short term goals: 20 days  Short term goal 1: Pt will be independent with bed mob/transfers to restore functional independence. Short term goal 2: Pt will ambulate >/= 100ft with LRAD at level of SUP without LOB/unsteadiness. Short term goal 3: Pt will tolerate 20-30mins ther ex/act to improve endurance for ADLs and functional tasks. Plan    Plan  Times per week: 7 days per week  Times per day: Twice a day  Current Treatment Recommendations: Strengthening,Balance Training,Transfer Training,IADL Training,ADL/Self-care Training,Stair training,Endurance Training,Gait Training,Neuromuscular Re-education,Home Exercise Program,Patient/Caregiver Education & Training,Safety Education & Training  Safety Devices  Type of devices:  All fall risk precautions in place,Left in chair,Call light within reach,Nurse notified     Therapy Time   Individual Concurrent Group Co-treatment   Time In 0709         Time Out 0734         Minutes 25                 [place], [place]

## 2022-02-09 NOTE — PROGRESS NOTES
Occupational Therapy  Facility/Department: [facility] AT THE [facility] MED SURG  Daily Treatment Note  NAME: Roopa Gordillo  : 1945  MRN: [id number]    Date of Service: 2022    Discharge Recommendations:  Continue to assess pending progress,Home with assist PRN,Subacute/Skilled Nursing Facility       Assessment      OT Education: Energy Conservation  Patient Education: Pt educated on breathing technique and energy conservation  Barriers to Learning: none  Activity Tolerance  Activity Tolerance: Patient limited by fatigue  Safety Devices  Safety Devices in place: Yes  Type of devices: All fall risk precautions in place; Left in chair;Call light within reach;Nurse notified         Patient Diagnosis(es): The primary encounter diagnosis was Multifocal pneumonia. A diagnosis of Atypical pneumonia was also pertinent to this visit. has a past medical history of Chronic pain syndrome, COPD (chronic obstructive pulmonary disease) ([address] Depression, GERD (gastroesophageal reflux disease), Hypothyroidism, and Osteoporosis. has a past surgical history that includes Hysterectomy; back surgery; Breast surgery; Carpal tunnel release; joint replacement; joint replacement; fracture surgery (Left, 2018); and Wrist fracture surgery (Left, 2018). Restrictions  Restrictions/Precautions  Restrictions/Precautions: General Precautions,Fall Risk  Subjective   General  Chart Reviewed: Yes  Patient assessed for rehabilitation services?: Yes  Response to previous treatment: Patient with no complaints from previous session  Family / Caregiver Present: Yes  Referring Practitioner: Karel Berry  Diagnosis: Aypical Pneumonia  Subjective  Subjective: Pt complained of lower back pain 3/10 this date. General Comment  Comments: Pt sitting in bedside chair, ageeable to OT [address] pt denies ADL at this time.  Pt request to \"walk\"      Orientation     Objective             Balance  Sitting Balance: Independent  Standing Balance: Stand by assistance  Standing Balance  Time: ~15  Activity: Pt completed dynamic standing task ~15 minutes reaching to retrieve cones from floor level and table top level. Pt completed one trial with 2WW and one trial w/o walker. Pt demo 1 LOB without walker but able to self correct. Comment: Pt O2 sat dropped to 84% during functional mobility trials. Pt able to recover to 90's with a rb. Functional Mobility  Activity: Retrieve items;Transport items  Assist Level: Stand by assistance     Transfers  Sit to stand: Stand by assistance  Stand to sit: Stand by assistance                                                                 Plan   Plan  Times per week: 7  Times per day: Daily  Current Treatment Recommendations: Strengthening,Safety Education & Training,ROM,Balance Training,Patient/Caregiver Education & Training,Self-Care / ADL,Functional Mobility Training,Endurance Training  G-Code     OutComes Score                                                  AM-PAC Score             Goals  Short term goals  Time Frame for Short term goals: 21 visits  Short term goal 1: Pt will tolerate >5 mintues of BUE ther ex with no more than 2 RBs to improve functional strength for ADLs. Short term goal 2: Patient will complete functional mobility activity for ADL tasks with SBA using LRAD to increase safety and independence. Short term goal 3: Pt will complete basic self-care standing sinkside with SBA to increase standing tolerance and balance for ADLs.        Therapy Time   Individual Concurrent Group Co-treatment   Time In 0118         Time Out 1104         Minutes [address] [place] [address]

## 2022-02-09 NOTE — PROGRESS NOTES
Physical Therapy  Facility/Department: [facility] AT THE [facility] MED SURG  Daily Treatment Note  NAME: King Willy  : 1945  MRN: [id number]    Date of Service: 2022    Discharge Recommendations:  Continue to assess pending progress        Assessment   Treatment Diagnosis: generalized weakness  Prognosis: Good  Decision Making: Medium Complexity  PT Education: General Safety;Gait Training;Transfer Training;Goals  Patient Education: Educated pt on the above with good understanding noted. REQUIRES PT FOLLOW UP: Yes  Activity Tolerance  Activity Tolerance: Patient Tolerated treatment well     Patient Diagnosis(es): The primary encounter diagnosis was Multifocal pneumonia. A diagnosis of Atypical pneumonia was also pertinent to this visit. has a past medical history of Chronic pain syndrome, COPD (chronic obstructive pulmonary disease) ([address] Depression, GERD (gastroesophageal reflux disease), Hypothyroidism, and Osteoporosis. has a past surgical history that includes Hysterectomy; back surgery; Breast surgery; Carpal tunnel release; joint replacement; joint replacement; fracture surgery (Left, 2018); and Wrist fracture surgery (Left, 2018). Restrictions  Restrictions/Precautions  Restrictions/Precautions: General Precautions,Fall Risk  Subjective   General  Chart Reviewed: Yes  Response To Previous Treatment: Patient with no complaints from previous session. Family / Caregiver Present: Yes ( present)  Subjective  Subjective: Pt pleasant agreeable for treatment.  Pt reports that she did not sleep well last night          Orientation  Orientation  Overall Orientation Status: Within Functional Limits  Cognition      Objective   Bed mobility  Comment: Pt up in recliner upon arrival  Transfers  Sit to Stand: Stand by assistance;Supervision  Stand to sit: Stand by assistance;Supervision  Ambulation  Ambulation?: Yes  Ambulation 1  Surface: level tile  Device: No Device  Other Apparatus: O2  Assistance: Stand by assistance  Distance: 40ftx4  Comments: Assist with O2 tubing        Exercises  Straight Leg Raise: x20  Heelslides: x20  Hip Flexion: x20  Hip Abduction: x20  Knee Long Arc Quad: x20  Ankle Pumps: x20  Comments: Reclined and seated exercises B LE x20     Goals  Short term goals  Time Frame for Short term goals: 20 days  Short term goal 1: Pt will be independent with bed mob/transfers to restore functional independence. Short term goal 2: Pt will ambulate >/= 100ft with LRAD at level of SUP without LOB/unsteadiness. Short term goal 3: Pt will tolerate 20-30mins ther ex/act to improve endurance for ADLs and functional tasks. Plan    Plan  Times per week: 7 days per week  Times per day: Twice a day  Current Treatment Recommendations: Strengthening,Balance Training,Transfer Training,IADL Training,ADL/Self-care Training,Stair training,Endurance Training,Gait Training,Neuromuscular Re-education,Home Exercise Program,Patient/Caregiver Education & Training,Safety Education & Training  Safety Devices  Type of devices:  All fall risk precautions in place,Left in chair,Call light within reach,Nurse notified     Therapy Time   Individual Concurrent Group Co-treatment   Time In 3419         Time Out 1318         Minutes 23                 Meenakshi Ja, PTA

## 2022-02-09 NOTE — RT PROTOCOL NOTE
RESPIRATORY ASSESSMENT PROTOCOL                                                                                              Patient Name: Mauricio Belle Room#: 4941/7660-63 : 1945     Admitting diagnosis: Atypical pneumonia [J18.9]  Multifocal pneumonia [J18.9]       Medical History:   Past Medical History:   Diagnosis Date    Chronic pain syndrome     dilaudid infusion pump    [address] obstructive [facility] disease) [address] [address] [facility]     Depression     GERD (gastroesophageal reflux disease)     Hypothyroidism     Osteoporosis        PATIENT ASSESSMENT    LABORATORY DATA  Hematology:   Lab Results   Component Value Date    WBC 4.7 2022    RBC 4.00 2022    HGB 11.7 2022    HCT 37.8 2022     2022     Chemistry:    Lab Results   Component Value Date    PHART 7.398 2022    TJP2RTN 54.3 2022    PO2ART 75.9 2022    C7WZXXLI 95.0 2022    TSW2TMU 32.7 2022    PBEA 6.2 2022       VITALS  Pulse: 81   Resp: 18  BP: 133/62  SpO2: 99 % O2 Device: Nasal cannula  Temp: 97.5 °F (36.4 °C)    SKIN COLOR  [] Normal  [] Pale  [] Dusky  [] Cyanotic    RESPIRATORY PATTERN  [] Normal  [] Dyspnea  [] Cheyne-Granger  [] Kussmaul  [] Biots    AMBULATORY  [] Yes  [] No  [] With Assistance      Patient Acuity 0 1 2 3 4 Score   Level of Concious (LOC) [x]  Alert & Oriented or Pt normal LOC []  Confused;follows directions []  Confused & uncooper-ative []  Obtunded []  Comatose 0   Respiratory Rate  (RR) []  Reg. rate & pattern. 12 - 20 bpm  []  Increased RR. Greater than 20 bpm   [x]  SOB w/ exertion or RR greater than 24 bpm []  Access- ory muscle use at rest. Abn.  resp. []  SOB at rest.   2   Bilateral Breath Sounds (BBS) []  Clear []  Diminish-ed bases  []  Diminish-ed t/o, or rales   [x]  Sporadic, scattered wheezes or rhonchi []  Persistentwheezes and, or absent BBS 3   Cough []  Strong, effective, & non-prod. [x]  Effective & prod.  Less than 25 ml (2 TBSP) over past 24 hrs []  Ineffective & non-prod to less than 25 ML over past 24 hrs []  Ineffective and, or greater than 25 ml sputum prod. past 24 hrs. []  Nonspon- taneous; Requires suctioning 1   Pulmonary History  (PULM HX) []  No smoking and no chronic pulmonaryhistory []  Former smoker. Quit over 12 mos. ago []  Current smoker or quit w/ in 12 mos []  Pulm. History and, or 20 pk/yr smoking hx [x]  Admitted w/ acute pulm. dx and, or has been admitted w/ pulm. dx 2 or more times over past 12 mos 4   Surgical History this Admit  (SURG HX) [x]  No surgery []  General surgery []  Lower abdominal []  Thoracic or upper abdominal   []  Thoracic w/ pulm. disease 0   Chest X-Ray (CXR)/CT Scan []  Clear or not applicable []  Not available []  Atelect- asis or pleural effusions [x]  Localized infiltrate or pulm. edema []  Con-solidated Infiltrates, bilateral, or in more than 1 lobe 3   Slow or Forced VC, FEV1 OR PEFR (PULM FXN)  [x]  80% or greater, or not indicated []  Pt. unable to perform []  FEV1 or PEFR or VC 51-79%. []  FEV1 or PEFR or VC  30-49%   []  FEV1 or PEFR or VC less than 30%   0   TOTAL ACUITY: 13       CARE PLAN    If Acuity Level is 2, 3, or 4 in any of the following:    [] BILATERAL BREATH SOUNDS (BBS)     [] PULMONARY HISTORY (PULM HX)  [] PULMONARY FUNCTION (PULM FX)    Goal: Improve respiratory functions in patients with airway disease and decrease WOB    [x] AEROSOL PROTOCOL    Total Acuity:   16-32  []  Secondary Assessment in 24 hrs Total Acuity:  9-15  [x]  Secondary Assessment in 24 hrs Total Acuity:  4-8  []  Secondary Assessment in 48 hrs Total Acuity:  0-3  []  Secondary Assessment in 72 hrs   HHN AEROSOL THERAPY with  [physician-ordered bronchodilator(s)] q 4 & Albuterol PRN q2 hrs. Breath-Actuated Neb if BBS Acuity = 4, and pt. can use MP. Notify physician if condition deteriorates. HHN AEROSOL THERAPY with  [physician-ordered bronchodilator(s)]  QID and Albuterol PRN q4 hrs. Breath-Actuated Neb if BBS Acuity = 4, and pt. can use MP. Notify physician if condition deteriorates. MDI THERAPY with  2 actuations of [physician-ordered bronchodilator(s)] via spacer TID Albuterol and PRNq4 hrs. If unable to utilize MDI: HHN [physician-ordered bronchodilator(s)] TID and Albuterol PRN q4 hrs. Notify physician if condition deteriorates. MDI THERAPY with  [physician-ordered bronchodilator(s)] via spacer TID PRN. If unable to utilize MDI: HHN [physician-ordered bronchodilator(s)] TID PRN. Notify physician if condition deteriorates. If Acuity Level is 2, 3, or 4 in any of the following:    [] COUGH     [] SURGICAL HISTORY (SURG HX)  [] CHEST XRAY (CXR)    Goal: Improvement in sputum mobilization in patients with ineffective airway clearance. Reverse atelectasis. [x] Bronchopulmonary Hygiene Protocol    Total Acuity:   16-32  []  Secondary Assessment in 24 hrs Total Acuity:  9-15  [x]  Secondary Assessment in 24 hrs Total Acuity:  4-8  []  Secondary Assessment in 48 hrs Total Acuity:  0-3  []  Secondary Assessment in 72 hrs   METANEB QID with [physician-ordered bronchodilator(s)] if CXR Acuity = 4; otherwise:  PD&P, PEP, or Vest QID & PRN  NT Sxn PRN for ineffective cough  METANEB QID with [physician-ordered bronchodilator(s)] if CXR Acuity = 4; otherwise:  PD&P, PEP, or Vest TID & PRN  NT Sxn PRN for ineffective cough  Instruct patient to self-perform IS q1hr WA   Directed Cough self-performed q1hr WA     If Acuity Level is 2 or above in the following:    [] PULMONARY HISTORY (PULM HX)    Goal: Assist patient in quitting smoking to slow or stop the progression of lung disease.     [] Smoking Cessation Protocol    SMOKING CESSATION EDUCATION provided according to policy MO_471: (marlyn with an X)  ____Yes    ____ No     ____ NA    Smoking Cessation Booklet given:  ____Yes  ____No ____Patient Sharon Wilkinson

## 2022-02-09 NOTE — PROGRESS NOTES
Patient awake and up to the chair. C/o chronic pain, see mar for details. Vs and assessment completed. Patient also c/o cough, stated she is frustrated. She isn't getting anything up and and just cant stop coughing. Will talk with physician at rounds. Denies any other issues at this time. Will continue to monitor.

## 2022-02-09 NOTE — PROGRESS NOTES
Trever Verduzco M.D. Internal Medicine Progress Note    Patient: Mari Salazar  Date of Admission: 2/5/2022 10:49 AM  Hospital Day # 4  Date of Evaluation: 2/9/2022      SUBJECTIVE:    Patient seen for f/u of Atypical pneumonia. She is feeling rough today with worsening cough. She feels like she just can't get mucus to come up. She denies fever or chills. She is tolerating diet without n/v/d.      ROS:   Constitutional: negative  for fevers, and negative for chills. Respiratory: positive for shortness of breath, positive for cough, and positive for wheezing  Cardiovascular: negative for chest pain, and negative for palpitations  Gastrointestinal: negative for abdominal pain, negative for nausea,negative for vomiting, negative for diarrhea, and negative for constipation     All other systems were reviewed with the patient and are negative unless otherwise stated in HPI    -----------------------------------------------------------------  OBJECTIVE:  Vitals:   Temp: 97.5 °F (36.4 °C)  BP: 133/62  Resp: 18  Pulse: 81  SpO2: 99 % on supplemental O2    Weight  Wt Readings from Last 3 Encounters:   02/09/22 163 lb 12.8 oz (74.3 kg)   02/01/22 155 lb (70.3 kg)   01/18/22 146 lb (66.2 kg)     Body mass index is 28.12 kg/m². 24HR INTAKE/OUTPUT:      Intake/Output Summary (Last 24 hours) at 2/9/2022 1103  Last data filed at 2/9/2022 0837  Gross per 24 hour   Intake 660 ml   Output --   Net 660 ml       Exam:  GEN:    Awake, alert and oriented x3. EYES:   EOMI, pupils equal   NECK: Supple. No lymphadenopathy. No carotid bruit  CVS:     regular rate and rhythm, 2/6 systolic murmur  PULM:  diminished with bilateral scattered rhonchi, no acute respiratory distress  ABD:     Bowels sounds normal.  Abdomen is soft. No distention. no tenderness to palpation. EXT:     no edema bilaterally . No calf tenderness. NEURO: Moves all extremities. Motor and sensory are grossly intact  SKIN:    No rashes.   No skin lesions.      -----------------------------------------------------------------  DATA:  Complete Blood Count:   Recent Labs     02/07/22  0530 02/08/22 0600 02/09/22 0600   WBC 11.3 7.2 4.7   RBC 3.76* 4.15 4.00   HGB 11.1* 12.4 11.7*   HCT 36.1* 39.5 37.8   MCV 96.0 95.2 94.5   MCH 29.5 29.9 29.3   MCHC 30.7 31.4 31.0   RDW 15.9* 15.5* 15.4*    198 194   MPV 9.5 9.1 9.5        Last 3 Blood Glucose:   Recent Labs     02/07/22  0530 02/08/22 0600 02/09/22  0600   GLUCOSE 83 80 81        Comprehensive Metabolic Profile:   Recent Labs     02/07/22  0530 02/08/22 0600 02/09/22 0600    146* 142   K 4.4 4.4 3.9   CL 99 103 101   CO2 33* 33* 32*   BUN 17 12 11   CREATININE 0.70 0.55 0.55   GLUCOSE 83 80 81   CALCIUM 9.8 10.5* 10.3      Lactic Acid:   Lab Results   Component Value Date    LACTA 2.0 02/06/2022    LACTA 2.5 02/05/2022    LACTA 3.2 02/05/2022          Radiology/Imaging:  CT CHEST PULMONARY EMBOLISM W CONTRAST   Final Result   No evidence of pulmonary embolism. Similar extensive persistent pulmonary abnormalities/bronchitis compatible   with the history of COVID pneumonia. Some improvement noted left base but   worsening seen on the right, mostly RLL; consider increasing viral or   possibly superimposed bacterial or other bronchopneumonia with some   consolidation. Large hiatus hernia. Similar mild mediastinal adenopathy. Additional unchanged findings, as above. RECOMMENDATIONS:   Unavailable         XR CHEST PORTABLE   Final Result   Persistent opacities most significant in the left mid lung and concerning for   unchanged multifocal pneumonia. Clinical and imaging follow-up to resolution   recommended. ASSESSMENT / PLAN:  · Atypical pneumonia with h/o admission for pneumonia x 4 since intubation for Covid-19 on 12/26/2020  ? Continue current therapy   ? Pulmonology consult appreciated  ? Continue Zosyn x 7 days  ?  Repeat CT following Abx to determine need for bronchoscopy  ? Continue Prednisone taper  ? Albuterol nebs  ? Add Mucomyst x 2 tx's  ? Scheduled robitussin DM q4 hrs while awake  · Elevated troponin  ? Improved   ? No chest pain  · Mild CARLITA  ? Improvied with IVFs  · Chronic combined CHF  ? Continue Lasix  ? Continue Metoprolol  ? ACE / ARB not indicated per recent cardiology note 2/1/22  · Chronic pain syndrome  ? Pt has implantable dilaudid infusion pump  ? Continue Lyrica, PRN Norco  · Nutrition status:   ? Well developed, well nourished with no malnutrition  ? Dietician consult initiated  · Hospital Prophylaxis:   ? DVT: Xarelto  ?  Stress Ulcer: PPI  · High risk medications: none   · Disposition:    ? Discharge plan is home with home health for IV Abx administration through 2/13/22      Margarette Neil MD , M.D.  2/9/2022  11:03 AM

## 2022-02-09 NOTE — PROGRESS NOTES
Patient complains of substernal and left sided chest pain that is described as tight. Denies SOB, dizziness, nausea, back pain, and arm pain. EKG completed at 3623. Patient now asymptomatic.  Will discuss with provider

## 2022-02-10 VITALS
HEIGHT: 64 IN | BODY MASS INDEX: 27.25 KG/M2 | SYSTOLIC BLOOD PRESSURE: 142 MMHG | RESPIRATION RATE: 16 BRPM | WEIGHT: 159.6 LBS | HEART RATE: 101 BPM | OXYGEN SATURATION: 95 % | DIASTOLIC BLOOD PRESSURE: 67 MMHG | TEMPERATURE: 97.3 F

## 2022-02-10 LAB
ABSOLUTE EOS #: 0.07 K/UL (ref 0–0.44)
ABSOLUTE IMMATURE GRANULOCYTE: 0.09 K/UL (ref 0–0.3)
ABSOLUTE LYMPH #: 1.88 K/UL (ref 1.1–3.7)
ABSOLUTE MONO #: 0.53 K/UL (ref 0.1–1.2)
ANION GAP SERPL CALCULATED.3IONS-SCNC: 11 MMOL/L (ref 9–17)
BASOPHILS # BLD: 0 % (ref 0–2)
BASOPHILS ABSOLUTE: <0.03 K/UL (ref 0–0.2)
BUN BLDV-MCNC: 14 MG/DL (ref 8–23)
BUN/CREAT BLD: 20 (ref 9–20)
CALCIUM SERPL-MCNC: 10.5 MG/DL (ref 8.6–10.4)
CHLORIDE BLD-SCNC: 96 MMOL/L (ref 98–107)
CO2: 35 MMOL/L (ref 20–31)
CREAT SERPL-MCNC: 0.7 MG/DL (ref 0.5–0.9)
CULTURE: NORMAL
DIFFERENTIAL TYPE: ABNORMAL
EOSINOPHILS RELATIVE PERCENT: 1 % (ref 1–4)
GFR AFRICAN AMERICAN: >60 ML/MIN
GFR NON-AFRICAN AMERICAN: >60 ML/MIN
GFR SERPL CREATININE-BSD FRML MDRD: ABNORMAL ML/MIN/{1.73_M2}
GFR SERPL CREATININE-BSD FRML MDRD: ABNORMAL ML/MIN/{1.73_M2}
GLUCOSE BLD-MCNC: 88 MG/DL (ref 70–99)
HCT VFR BLD CALC: 40.6 % (ref 36.3–47.1)
HEMOGLOBIN: 12.8 G/DL (ref 11.9–15.1)
IMMATURE GRANULOCYTES: 2 %
LYMPHOCYTES # BLD: 38 % (ref 24–43)
Lab: NORMAL
MCH RBC QN AUTO: 29.5 PG (ref 25.2–33.5)
MCHC RBC AUTO-ENTMCNC: 31.5 G/DL (ref 28.4–34.8)
MCV RBC AUTO: 93.5 FL (ref 82.6–102.9)
MONOCYTES # BLD: 11 % (ref 3–12)
NRBC AUTOMATED: 0 PER 100 WBC
PDW BLD-RTO: 15 % (ref 11.8–14.4)
PLATELET # BLD: 205 K/UL (ref 138–453)
PLATELET ESTIMATE: ABNORMAL
PMV BLD AUTO: 9.2 FL (ref 8.1–13.5)
POTASSIUM SERPL-SCNC: 3.8 MMOL/L (ref 3.7–5.3)
RBC # BLD: 4.34 M/UL (ref 3.95–5.11)
RBC # BLD: ABNORMAL 10*6/UL
SEG NEUTROPHILS: 48 % (ref 36–65)
SEGMENTED NEUTROPHILS ABSOLUTE COUNT: 2.4 K/UL (ref 1.5–8.1)
SODIUM BLD-SCNC: 142 MMOL/L (ref 135–144)
SPECIMEN DESCRIPTION: NORMAL
WBC # BLD: 5 K/UL (ref 3.5–11.3)
WBC # BLD: ABNORMAL 10*3/UL

## 2022-02-10 PROCEDURE — 94669 MECHANICAL CHEST WALL OSCILL: CPT

## 2022-02-10 PROCEDURE — 97530 THERAPEUTIC ACTIVITIES: CPT

## 2022-02-10 PROCEDURE — 6370000000 HC RX 637 (ALT 250 FOR IP): Performed by: NURSE PRACTITIONER

## 2022-02-10 PROCEDURE — 6360000002 HC RX W HCPCS: Performed by: INTERNAL MEDICINE

## 2022-02-10 PROCEDURE — 85025 COMPLETE CBC W/AUTO DIFF WBC: CPT

## 2022-02-10 PROCEDURE — 2580000003 HC RX 258: Performed by: INTERNAL MEDICINE

## 2022-02-10 PROCEDURE — 97116 GAIT TRAINING THERAPY: CPT

## 2022-02-10 PROCEDURE — 6370000000 HC RX 637 (ALT 250 FOR IP): Performed by: INTERNAL MEDICINE

## 2022-02-10 PROCEDURE — 36415 COLL VENOUS BLD VENIPUNCTURE: CPT

## 2022-02-10 PROCEDURE — 80048 BASIC METABOLIC PNL TOTAL CA: CPT

## 2022-02-10 PROCEDURE — 97110 THERAPEUTIC EXERCISES: CPT

## 2022-02-10 PROCEDURE — 94761 N-INVAS EAR/PLS OXIMETRY MLT: CPT

## 2022-02-10 PROCEDURE — 2700000000 HC OXYGEN THERAPY PER DAY

## 2022-02-10 PROCEDURE — 94640 AIRWAY INHALATION TREATMENT: CPT

## 2022-02-10 RX ORDER — LIDOCAINE HYDROCHLORIDE 10 MG/ML
5 INJECTION, SOLUTION INFILTRATION; PERINEURAL ONCE
Status: DISCONTINUED | OUTPATIENT
Start: 2022-02-10 | End: 2022-02-10 | Stop reason: HOSPADM

## 2022-02-10 RX ORDER — SODIUM CHLORIDE 9 MG/ML
25 INJECTION, SOLUTION INTRAVENOUS PRN
Status: DISCONTINUED | OUTPATIENT
Start: 2022-02-10 | End: 2022-02-10 | Stop reason: HOSPADM

## 2022-02-10 RX ORDER — PREDNISONE 10 MG/1
TABLET ORAL
Qty: 40 TABLET | Refills: 0 | Status: SHIPPED | OUTPATIENT
Start: 2022-02-10 | End: 2022-02-20

## 2022-02-10 RX ORDER — GUAIFENESIN/DEXTROMETHORPHAN 100-10MG/5
5 SYRUP ORAL
Status: ON HOLD | COMMUNITY
Start: 2022-02-10 | End: 2022-05-02 | Stop reason: SDUPTHER

## 2022-02-10 RX ORDER — SODIUM CHLORIDE 0.9 % (FLUSH) 0.9 %
5-40 SYRINGE (ML) INJECTION EVERY 12 HOURS SCHEDULED
Status: DISCONTINUED | OUTPATIENT
Start: 2022-02-10 | End: 2022-02-10 | Stop reason: HOSPADM

## 2022-02-10 RX ORDER — SODIUM CHLORIDE 0.9 % (FLUSH) 0.9 %
5-40 SYRINGE (ML) INJECTION PRN
Status: DISCONTINUED | OUTPATIENT
Start: 2022-02-10 | End: 2022-02-10 | Stop reason: HOSPADM

## 2022-02-10 RX ADMIN — Medication 1000 MG: at 08:09

## 2022-02-10 RX ADMIN — HYDROCODONE BITARTRATE AND ACETAMINOPHEN 1 TABLET: 10; 325 TABLET ORAL at 16:57

## 2022-02-10 RX ADMIN — GUAIFENESIN AND DEXTROMETHORPHAN 5 ML: 100; 10 SYRUP ORAL at 05:30

## 2022-02-10 RX ADMIN — LEVOTHYROXINE SODIUM 150 MCG: 150 TABLET ORAL at 08:08

## 2022-02-10 RX ADMIN — IPRATROPIUM BROMIDE AND ALBUTEROL SULFATE 1 AMPULE: 2.5; .5 SOLUTION RESPIRATORY (INHALATION) at 05:48

## 2022-02-10 RX ADMIN — DEXTROSE MONOHYDRATE 3375 MG: 50 INJECTION, SOLUTION INTRAVENOUS at 14:30

## 2022-02-10 RX ADMIN — DULOXETINE 60 MG: 60 CAPSULE, DELAYED RELEASE ORAL at 08:08

## 2022-02-10 RX ADMIN — PREDNISONE 30 MG: 10 TABLET ORAL at 08:11

## 2022-02-10 RX ADMIN — IPRATROPIUM BROMIDE AND ALBUTEROL SULFATE 1 AMPULE: 2.5; .5 SOLUTION RESPIRATORY (INHALATION) at 14:39

## 2022-02-10 RX ADMIN — PREGABALIN 300 MG: 75 CAPSULE ORAL at 08:08

## 2022-02-10 RX ADMIN — FUROSEMIDE 40 MG: 10 INJECTION, SOLUTION INTRAMUSCULAR; INTRAVENOUS at 08:10

## 2022-02-10 RX ADMIN — POTASSIUM BICARBONATE 20 MEQ: 782 TABLET, EFFERVESCENT ORAL at 08:08

## 2022-02-10 RX ADMIN — GUAIFENESIN AND DEXTROMETHORPHAN 5 ML: 100; 10 SYRUP ORAL at 14:30

## 2022-02-10 RX ADMIN — CALCIUM CARBONATE-VITAMIN D TAB 500 MG-200 UNIT 1 TABLET: 500-200 TAB at 16:49

## 2022-02-10 RX ADMIN — PANTOPRAZOLE SODIUM 40 MG: 40 TABLET, DELAYED RELEASE ORAL at 16:49

## 2022-02-10 RX ADMIN — HYDROXYCHLOROQUINE SULFATE 200 MG: 200 TABLET ORAL at 08:08

## 2022-02-10 RX ADMIN — RIVAROXABAN 20 MG: 20 TABLET, FILM COATED ORAL at 08:08

## 2022-02-10 RX ADMIN — IPRATROPIUM BROMIDE AND ALBUTEROL SULFATE 1 AMPULE: 2.5; .5 SOLUTION RESPIRATORY (INHALATION) at 09:56

## 2022-02-10 RX ADMIN — DEXTROSE MONOHYDRATE 3375 MG: 50 INJECTION, SOLUTION INTRAVENOUS at 05:53

## 2022-02-10 RX ADMIN — ASPIRIN 81 MG: 81 TABLET, CHEWABLE ORAL at 08:09

## 2022-02-10 RX ADMIN — FUROSEMIDE 40 MG: 10 INJECTION, SOLUTION INTRAMUSCULAR; INTRAVENOUS at 16:49

## 2022-02-10 RX ADMIN — GUAIFENESIN AND DEXTROMETHORPHAN 5 ML: 100; 10 SYRUP ORAL at 10:30

## 2022-02-10 RX ADMIN — PANTOPRAZOLE SODIUM 40 MG: 40 TABLET, DELAYED RELEASE ORAL at 08:08

## 2022-02-10 RX ADMIN — CALCIUM CARBONATE-VITAMIN D TAB 500 MG-200 UNIT 1 TABLET: 500-200 TAB at 08:08

## 2022-02-10 RX ADMIN — Medication 2000 UNITS: at 08:08

## 2022-02-10 ASSESSMENT — PAIN DESCRIPTION - ORIENTATION
ORIENTATION: LOWER
ORIENTATION: LOWER

## 2022-02-10 ASSESSMENT — PAIN SCALES - GENERAL
PAINLEVEL_OUTOF10: 6
PAINLEVEL_OUTOF10: 2
PAINLEVEL_OUTOF10: 7

## 2022-02-10 ASSESSMENT — PAIN DESCRIPTION - DESCRIPTORS: DESCRIPTORS: ACHING

## 2022-02-10 ASSESSMENT — PAIN DESCRIPTION - LOCATION
LOCATION: BACK
LOCATION: BACK

## 2022-02-10 ASSESSMENT — PAIN DESCRIPTION - ONSET: ONSET: ON-GOING

## 2022-02-10 ASSESSMENT — PAIN DESCRIPTION - FREQUENCY: FREQUENCY: CONTINUOUS

## 2022-02-10 ASSESSMENT — PAIN DESCRIPTION - PAIN TYPE
TYPE: CHRONIC PAIN
TYPE: CHRONIC PAIN

## 2022-02-10 NOTE — PROGRESS NOTES
[address] [employer] arrived with pt's home O2 concentrator. Pt instructed on how to use, signed form herself.  Pt now waiting on son to arrive to transport home

## 2022-02-10 NOTE — PROGRESS NOTES
Faxed paper work to [facility] to get the patient's oxygen for home today.     BUZZ Jack

## 2022-02-10 NOTE — PROGRESS NOTES
Physical Therapy  Facility/Department: [facility] AT THE [facility] MED SURG  Daily Treatment Note  NAME: Golden Burkitt  : 1945  MRN: [id number]    Date of Service: 2/10/2022    Discharge Recommendations:  Continue to assess pending progress        Assessment   Body structures, Functions, Activity limitations: Decreased functional mobility ; Decreased safe awareness;Decreased endurance;Decreased high-level IADLs;Decreased balance;Decreased ADL status; Decreased strength  Treatment Diagnosis: generalized weakness  Prognosis: Good  Decision Making: Medium Complexity  PT Education: General Safety;Gait Training;Transfer Training;Goals  REQUIRES PT FOLLOW UP: Yes  Activity Tolerance  Activity Tolerance: Patient Tolerated treatment well     Patient Diagnosis(es): The primary encounter diagnosis was Multifocal pneumonia. A diagnosis of Atypical pneumonia was also pertinent to this visit. has a past medical history of Chronic pain syndrome, COPD (chronic obstructive pulmonary disease) ([facility] [address] Depression, GERD (gastroesophageal reflux disease), Hypothyroidism, and Osteoporosis. has a past surgical history that includes Hysterectomy; back surgery; Breast surgery; Carpal tunnel release; joint replacement; joint replacement; fracture surgery (Left, 2018); and Wrist fracture surgery (Left, 2018). Restrictions  Restrictions/Precautions  Restrictions/Precautions: General Precautions,Fall Risk  Subjective   General  Chart Reviewed: Yes  Response To Previous Treatment: Patient with no complaints from previous session. Family / Caregiver Present: No  Subjective  Subjective: Pt. pleasant and in chair upon arrival, agreeable to therapy and reports having chronic back pain.   Pain Screening  Patient Currently in Pain: Yes  Vital Signs  Patient Currently in Pain: Yes       Orientation  Orientation  Overall Orientation Status: Within Functional Limits  Cognition      Objective      Transfers  Sit to Stand: Supervision  Stand to sit: Supervision  Ambulation  Ambulation?: Yes  Ambulation 1  Surface: level tile  Device: No Device  Other Apparatus: O2  Assistance: Stand by assistance;Supervision  Gait Deviations: None  Distance: [id number]  Stairs/Curb  Stairs?: No        Exercises  Comments: Seated exercises B LE x20. Standing sink exercises B Le x15         Comment: STS x10    Goals  Short term goals  Time Frame for Short term goals: 20 days  Short term goal 1: Pt will be independent with bed mob/transfers to restore functional independence. Short term goal 2: Pt will ambulate >/= 100ft with LRAD at level of SUP without LOB/unsteadiness. Short term goal 3: Pt will tolerate 20-30mins ther ex/act to improve endurance for ADLs and functional tasks. Plan    Plan  Times per week: 7 days per week  Times per day: Twice a day  Current Treatment Recommendations: Strengthening,Balance Training,Transfer Training,IADL Training,ADL/Self-care Training,Stair training,Endurance Training,Gait Training,Neuromuscular Re-education,Home Exercise Program,Patient/Caregiver Education & Training,Safety Education & Training  Safety Devices  Type of devices:  All fall risk precautions in place,Left in chair,Call light within reach,Nurse notified     Therapy Time   Individual Concurrent Group Co-treatment   Time In 0717         Time Out 0740         Minutes [address] [place], [place]

## 2022-02-10 NOTE — PROGRESS NOTES
[facility]   Cardiopulmonary Services  [address] [address]           A home oxygen evaluation has been completed. SpO2 was 91% on room air at rest. Patient was walked for 1 minutes. SpO2 was 86-88 % on room air during walking on room air. Oxygen was applied at 2-3 lpm via nasal cannula to maintain a SpO2 between 92-95% while walking. I attest to the above results.   Electronically signed by Alverto Campos RCP on 2/10/22 at 2:41 PM EST

## 2022-02-10 NOTE — PROGRESS NOTES
Physical Therapy  Facility/Department: [facility] AT THE [facility] MED SURG  Daily Treatment Note  NAME: Mari Salazar  : 1945  MRN: [id number]    Date of Service: 2/10/2022    Discharge Recommendations:  Continue to assess pending progress        Assessment   Body structures, Functions, Activity limitations: Decreased functional mobility ; Decreased safe awareness;Decreased endurance;Decreased high-level IADLs;Decreased balance;Decreased ADL status; Decreased strength  Treatment Diagnosis: generalized weakness  Prognosis: Good  Decision Making: Medium Complexity  PT Education: General Safety;Gait Training;Transfer Training;Goals  Patient Education: Educated pt on the above with good understanding noted. REQUIRES PT FOLLOW UP: Yes  Activity Tolerance  Activity Tolerance: Patient Tolerated treatment well     Patient Diagnosis(es): The primary encounter diagnosis was Multifocal pneumonia. A diagnosis of Atypical pneumonia was also pertinent to this visit. has a past medical history of Chronic pain syndrome, COPD (chronic obstructive pulmonary disease) ([address] Depression, GERD (gastroesophageal reflux disease), Hypothyroidism, and Osteoporosis. has a past surgical history that includes Hysterectomy; back surgery; Breast surgery; Carpal tunnel release; joint replacement; joint replacement; fracture surgery (Left, 2018); and Wrist fracture surgery (Left, 2018). Restrictions  Restrictions/Precautions  Restrictions/Precautions: General Precautions,Fall Risk  Subjective   General  Chart Reviewed: Yes  Response To Previous Treatment: Patient with no complaints from previous session. Family / Caregiver Present: Yes  Subjective  Subjective: Pt. up in room upon arrival stating she was going home. Informed pt. per RN she does not have orders from the doctor yet. Pt. understands and is willing to work with therapy at this time.   Pain Screening  Patient Currently in Pain: Yes  Vital Signs  Patient Currently in Pain: Yes       Orientation  Orientation  Overall Orientation Status: Within Functional Limits  Cognition      Objective      Transfers  Sit to Stand: Supervision  Stand to sit: Supervision  Ambulation  Ambulation?: Yes  Ambulation 1  Surface: level tile  Device: No Device  Other Apparatus: O2  Assistance: Stand by assistance;Supervision  Gait Deviations: None  Distance: 400ft  Comments: SpO2 decreased on room air to 83% and increased to 87 upon sitting, RN aware. Stairs/Curb  Stairs?: No        Exercises  Comments: Seated exercises B LE x20. Standing sink exercises B Le x15         Comment: STS x10    Goals  Short term goals  Time Frame for Short term goals: 20 days  Short term goal 1: Pt will be independent with bed mob/transfers to restore functional independence. Short term goal 2: Pt will ambulate >/= 100ft with LRAD at level of SUP without LOB/unsteadiness. Short term goal 3: Pt will tolerate 20-30mins ther ex/act to improve endurance for ADLs and functional tasks. Plan    Plan  Times per week: 7 days per week  Times per day: Twice a day  Current Treatment Recommendations: Strengthening,Balance Training,Transfer Training,IADL Training,ADL/Self-care Training,Stair training,Endurance Training,Gait Training,Neuromuscular Re-education,Home Exercise Program,Patient/Caregiver Education & Training,Safety Education & Training  Safety Devices  Type of devices:  All fall risk precautions in place,Left in chair,Call light within reach,Nurse notified     Therapy Time   Individual Concurrent Group Co-treatment   Time In 1335         Time Out 1402         Minutes [address] [place] [address]

## 2022-02-10 NOTE — PROGRESS NOTES
Access RN arrived to place PICC in other patient and informs writer that [employer] will be placed when done in ICU. RN from Access RN states that if pt is to received Zosyn a Midline would be appropriate. Call placed to Dr. Андрей Sullivan to verify if midline is ok or if PICC is preferred. Spoke with MDs nurse and she will inform doctor and have him return call.  Await

## 2022-02-10 NOTE — PROGRESS NOTES
The  and patient in agreement with the out of pocket cost for her IV medications. Will move forward with [facility] and [facility].     BUZZ Doran

## 2022-02-10 NOTE — PROGRESS NOTES
Inform [facility] health of the discharge today and the need for IV zosyn at 10 PM tonight.     BUZZ Sifuentes

## 2022-02-10 NOTE — PROGRESS NOTES
Patient will have an out of pocket cost for her IV zosyn for 4 days will be $68.00 per [facility]. Plan home today with her IV medication and [facility].     BUZZ Ojeda

## 2022-02-10 NOTE — PROGRESS NOTES
[facility] DME called regarding ETA for home O2, staff report it will likely be \"a couple of hours\" before they can deliver. Pt informed.  Await

## 2022-02-10 NOTE — PROGRESS NOTES
Occupational Therapy  Facility/Department: [facility] AT THE [facility] MED SURG  Daily Treatment Note  NAME: Esperanza Jeans  : 1945  MRN: [id number]    Date of Service: 2/10/2022    Discharge Recommendations:  Continue to assess pending progress,Home with assist PRN,Subacute/Skilled Nursing Facility       Assessment      OT Education: OT Role;Energy Conservation  Activity Tolerance  Activity Tolerance: Patient Tolerated treatment well  Safety Devices  Safety Devices in place: Yes  Type of devices: All fall risk precautions in place; Left in chair;Call light within reach;Nurse notified         Patient Diagnosis(es): The primary encounter diagnosis was Multifocal pneumonia. A diagnosis of Atypical pneumonia was also pertinent to this visit. has a past medical history of Chronic pain syndrome, COPD (chronic obstructive pulmonary disease) ([facility] [address] Depression, GERD (gastroesophageal reflux disease), Hypothyroidism, and Osteoporosis. has a past surgical history that includes Hysterectomy; back surgery; Breast surgery; Carpal tunnel release; joint replacement; joint replacement; fracture surgery (Left, 2018); and Wrist fracture surgery (Left, 2018). Restrictions  Restrictions/Precautions  Restrictions/Precautions: General Precautions,Fall Risk  Subjective   General  Chart Reviewed: Yes  Patient assessed for rehabilitation services?: Yes  Response to previous treatment: Patient with no complaints from previous session  Family / Caregiver Present: Yes  Referring Practitioner: Mike Walker  Diagnosis: Aypical Pneumonia  Subjective  Subjective: Pt stated having back pain and saying that she has lived with it for years  General Comment  Comments: Pt standing sinkside upon therapist arrival. stated that she was going home and at first refused therapy. Therapist educated pt that she was not leaving just yet and pt agreeable to ther ex this date.       Orientation     Objective             Balance  Sitting Balance: Independent  Standing Balance: Supervision  Functional Mobility  Functional - Mobility Device: No device  Activity: Other  Assist Level: Stand by assistance                                                  Type of ROM/Therapeutic Exercise  Comment: pt completed BUE 1# free weight in 6 planes x 20 reps x 1 set with G tolerance and 2 RB throuhgout. Plan   Plan  Times per week: 7  Times per day: Daily  Current Treatment Recommendations: Strengthening,Safety Education & Training,ROM,Balance Training,Patient/Caregiver Education & Training,Self-Care / ADL,Functional Mobility Training,Endurance Training  G-Code     OutComes Score                                                  AM-PAC Score             Goals  Short term goals  Time Frame for Short term goals: 21 visits  Short term goal 1: Pt will tolerate >5 mintues of BUE ther ex with no more than 2 RBs to improve functional strength for ADLs. Short term goal 2: Patient will complete functional mobility activity for ADL tasks with SBA using LRAD to increase safety and independence. Short term goal 3: Pt will complete basic self-care standing sinkside with SBA to increase standing tolerance and balance for ADLs.        Therapy Time   Individual Concurrent Group Co-treatment   Time In 1300         Time Out 1318         Minutes [address], [address], [place]

## 2022-02-10 NOTE — PROGRESS NOTES
Discharge instructions given to pt and . No questions, pt verbalized understanding all instructions. Pt aware of follow up appointment as listed on AVS. Await home O2 to arrive for discharge.

## 2022-02-10 NOTE — PROGRESS NOTES
Jt Mccarty M.D. Internal Medicine Progress Note    Patient: Chuyita Cullen  Date of Admission: 2/5/2022 10:49 AM  Hospital Day # 5  Date of Evaluation: 2/10/2022      SUBJECTIVE:    Patient seen for f/u of Atypical pneumonia. She is feeling a lot better this morning. She is still coughing, but not nearly as bad. She still isn't producing much mucus. She denies fever, chills, chest pain or palpitations. She is tolerating diet without difficulty. ROS:   Constitutional: negative  for fevers, and negative for chills. Respiratory: positive for shortness of breath, positive for cough, and positive for wheezing  Cardiovascular: negative for chest pain, and negative for palpitations  Gastrointestinal: negative for abdominal pain, negative for nausea,negative for vomiting, negative for diarrhea, and negative for constipation     All other systems were reviewed with the patient and are negative unless otherwise stated in HPI    -----------------------------------------------------------------  OBJECTIVE:  Vitals:   Temp: 97.3 °F (36.3 °C)  BP: (!) 142/67  Resp: 20  Pulse: 101  SpO2: 92 % on supplemental O2    Weight  Wt Readings from Last 3 Encounters:   02/10/22 159 lb 9.6 oz (72.4 kg)   02/01/22 155 lb (70.3 kg)   01/18/22 146 lb (66.2 kg)     Body mass index is 27.4 kg/m². 24HR INTAKE/OUTPUT:    No intake or output data in the 24 hours ending 02/10/22 1115    Exam:  GEN:    Awake, alert and oriented x3. EYES:   EOMI, pupils equal   NECK: Supple. No lymphadenopathy. No carotid bruit  CVS:     regular rate and rhythm, 2/6 systolic murmur  PULM:  diminished with bilateral scattered rhonchi, no acute respiratory distress  ABD:     Bowels sounds normal.  Abdomen is soft. No distention. no tenderness to palpation. EXT:     no edema bilaterally . No calf tenderness. NEURO: Moves all extremities. Motor and sensory are grossly intact  SKIN:    No rashes. No skin lesions. -----------------------------------------------------------------  DATA:  Complete Blood Count:   Recent Labs     02/08/22  0600 02/09/22  0600 02/10/22  0605   WBC 7.2 4.7 5.0   RBC 4.15 4.00 4.34   HGB 12.4 11.7* 12.8   HCT 39.5 37.8 40.6   MCV 95.2 94.5 93.5   MCH 29.9 29.3 29.5   MCHC 31.4 31.0 31.5   RDW 15.5* 15.4* 15.0*    194 205   MPV 9.1 9.5 9.2        Last 3 Blood Glucose:   Recent Labs     02/08/22  0600 02/09/22  0600 02/10/22  0605   GLUCOSE 80 81 88        Comprehensive Metabolic Profile:   Recent Labs     02/08/22 0600 02/09/22  0600 02/10/22  0605   * 142 142   K 4.4 3.9 3.8    101 96*   CO2 33* 32* 35*   BUN 12 11 14   CREATININE 0.55 0.55 0.70   GLUCOSE 80 81 88   CALCIUM 10.5* 10.3 10.5*      Lactic Acid:   Lab Results   Component Value Date    LACTA 2.0 02/06/2022    LACTA 2.5 02/05/2022    LACTA 3.2 02/05/2022          Radiology/Imaging:  CT CHEST PULMONARY EMBOLISM W CONTRAST   Final Result   No evidence of pulmonary embolism. Similar extensive persistent pulmonary abnormalities/bronchitis compatible   with the history of COVID pneumonia. Some improvement noted left base but   worsening seen on the right, mostly RLL; consider increasing viral or   possibly superimposed bacterial or other bronchopneumonia with some   consolidation. Large hiatus hernia. Similar mild mediastinal adenopathy. Additional unchanged findings, as above. RECOMMENDATIONS:   Unavailable         XR CHEST PORTABLE   Final Result   Persistent opacities most significant in the left mid lung and concerning for   unchanged multifocal pneumonia. Clinical and imaging follow-up to resolution   recommended. ASSESSMENT / PLAN:  · Atypical pneumonia with h/o admission for pneumonia x 4 since intubation for Covid-19 on 12/26/2020  ? Continue current therapy   ? Pulmonology consult appreciated  ? Continue Zosyn x 7 days (last day 2/13/22)  ?  Repeat CT following Abx to determine need for bronchoscopy  ? Continue Prednisone taper  ? Albuterol nebs  ? Add Mucomyst x 2 tx's  ? Scheduled robitussin DM q4 hrs while awake  · Elevated troponin  ? Improved   ? No chest pain  · Mild CARLITA  ? Improvied with IVFs  · Chronic combined CHF  ? Continue Lasix  ? Continue Metoprolol  ? ACE / ARB not indicated per recent cardiology note 2/1/22  · Chronic pain syndrome  ? Pt has implantable dilaudid infusion pump  ? Continue Lyrica, PRN Norco  · Nutrition status:   ? Well developed, well nourished with no malnutrition  ? Dietician consult initiated  · Hospital Prophylaxis:   ? DVT: Xarelto  ? Stress Ulcer: PPI  · High risk medications: none   · Disposition:    ? Discharge plan is home with home health for IV Abx administration through 2/13/22  ? Home O2 evaluation ordered  ? Pt counseled that if she qualifies for home O2, we will order this tx for her and it will be delivered to her home. Pt acknowledges understanding and accepts home O2 if warranted. Please use this as my face to face encounter for home DME for home O2 if she qualifies.        Margarette Neil MD , M.D.  2/10/2022  11:15 AM

## 2022-02-10 NOTE — DISCHARGE INSTR - DIET

## 2022-02-10 NOTE — DISCHARGE SUMMARY
Candelaria Santana M.D. Internal Medicine Discharge Summary    Patient ID:  Marco Gave  [id number]  1945    Admission date: 2/5/2022    Discharge date: 2/10/2022     Admitting Physician: Shadi Bhatia MD     Primary Care Physician: dOette Menendez MD     Primary Discharge Diagnoses:   Patient Active Problem List    Diagnosis Date Noted    Atypical pneumonia 02/05/2022    New onset a-fib [address] [address] 02/01/2022    Community acquired pneumonia 01/16/2022    Anemia 10/14/2021    Biventricular congestive heart failure ([facility] [address] [facility]     Hypothyroidism 10/05/2018    Major depressive disorder 10/05/2018    Chronic midline low back pain without sciatica 10/05/2018    Iron deficiency anemia 10/05/2018       Additional Diagnoses:       Diagnosis Date    Chronic pain syndrome     dilaudid infusion pump    COPD (chronic obstructive pulmonary disease) (HCC)     Depression     GERD (gastroesophageal reflux disease)     Hypothyroidism     Osteoporosis         Hospital Course: The patient was admitted for atypical pneumonia. Since she had been intubated from 12/26/20 to 1/5/21 she has been admitted 4 times for pneumonia. There is concern for possible aspiration vs possible inflammatory changes. She was treated initially with Rocephin / Azithromycin but was switched to Zosyn by Pulm/CC. The plan is to complete 7 day course of Zosyn and then repeat CT scan of the chest to determine the next course of action. In ER she had elevated troponin of 105 and 18 bt the following morning her troponin was down to 27. She never had any chest pain and EKG was non-ischemic. She also had mild CARLITA on admission but this resolved with IVF's initially. She was still requiring supplemental O2 at 1L nc on the day of discharge and home O2 eval revealed hypoxia with ambulation and she qualified for home O2 at 2L nc continuous. She will be DC'd with [facility] for TID Zosyn administration through 2/13/22.   Following that, Fosamax 70 MG tablet  Generic drug: alendronate     furosemide 20 MG tablet  Commonly known as: Lasix  Take 1 tablet by mouth daily     HYDROcodone-acetaminophen  MG per tablet  Commonly known as: NORCO     levothyroxine 150 MCG tablet  Commonly known as: SYNTHROID     pantoprazole sodium 40 MG Pack packet  Commonly known as: PROTONIX     PLAQUENIL PO     potassium chloride 20 MEQ packet  Commonly known as: KLOR-CON     pregabalin 300 MG capsule  Commonly known as: LYRICA  Take 1 capsule by mouth 2 times daily for 30 days.      rivaroxaban 20 MG Tabs tablet  Commonly known as: Xarelto  Take 1 tablet by mouth daily (with breakfast)     traZODone 100 MG tablet  Commonly known as: DESYREL     vitamin D 50 MCG (2000 UT) Tabs tablet  Commonly known as: CHOLECALCIFEROL  Take 1 tablet by mouth daily for 7 days           Where to Get Your Medications      You can get these medications from any pharmacy    Bring a paper prescription for each of these medications  · piperacillin-tazobactam  infusion         Patient Instructions:   · Activity: activity as tolerated  · Diet: regular diet  · Wound Care: none needed  · Home O2 ordered at 2L continuous with portability  · See today's progress note for face to face encounter  · Follow up with Robby Lundy MD in 1-2 weeks   · Follow-up with Dr. Boris Oconnell in pulmonology clinic in 1-2 weeks as directed    Karlos Anguiano on Discharge (if applicable)  ACE/ARB in CHF: N/A  ASA in MI: N/A  Statin in MI: N/A  Statin in CVA: N/A  Antiplatelet in CVA: N/A    Total time spent on discharge services: 40 minutes  Including the following activities:  · Evaluation and Management of patient  · Discussion with patient and/or surrogate about current care plan  · Coordination with Case Management and/or   · Coordination of care with Consultants (if applicable)   · Coordination of care with Receiving Facility Physician (if applicable)  · Completion of DME forms (if applicable)  · Preparation of Discharge Summary  · Preparation of Medication Reconciliation  · Preparation of Discharge Prescriptions      Signed:  Krystal Alonso MD, M.D.  2/10/2022  11:24 AM

## 2022-02-10 NOTE — PROGRESS NOTES
PICC line placement request called to Access RN at this time. Requested single lumen. RN to call back with ETA.  Await

## 2022-02-10 NOTE — PROGRESS NOTES
Son arrived and waiting to transport pt home. Pt d/c'd off unit at this time, via wheelchair, with spouse and son, to home. Belongings in hand. No issues noted.

## 2022-02-11 ENCOUNTER — CARE COORDINATION (OUTPATIENT)
Dept: CASE MANAGEMENT | Age: 77
End: 2022-02-11

## 2022-02-11 LAB
CULTURE: NORMAL
Lab: NORMAL
SPECIMEN DESCRIPTION: NORMAL

## 2022-02-11 NOTE — CARE COORDINATION
[facility] Transitions Initial Follow Up Call    Call within 2 business days of discharge: Yes    Patient: Kyaw Amin Patient : 1945   MRN: [id number]    Discharge Date: 2/10/22 RARS: Readmission Risk Score: 16.8 ( )      Last Discharge [facility]       Complaint Diagnosis Description Type Department Provider    22 Shortness of Breath Multifocal pneumonia . .. ED to Hosp-Admission (Discharged) ([facility]) Tod Mondragon MD; Toni E E. .. Attempted to call (x) 2. Both times the phone was busy. Care Transitions will continue to follow.   Follow Up  Future Appointments   Date Time Provider Marietta Leon   3/16/2022 12:15 PM [facility]ev[facility] Velaz[facility]   2023 10:00 AM Geoffrey Roberts [facility]       Ayleen Finn RN

## 2022-02-14 ENCOUNTER — CARE COORDINATION (OUTPATIENT)
Dept: CASE MANAGEMENT | Age: 77
End: 2022-02-14

## 2022-02-14 NOTE — CARE COORDINATION
[facility] Transitions Initial Follow Up Call    Call within 2 business days of discharge: Yes    Patient: Elsa Dewitt Patient : 1945    MRN: <[id number]>  Reason for Admission: Atypical pneumonia    Discharge Date: 2/10/22 RARS: Readmission Risk Score: 16.8 ( )      Last Discharge [facility]       Complaint Diagnosis Description Type Department Provider    22 Shortness of Breath Multifocal pneumonia . .. ED to Hosp-Admission (Discharged) ([facility]) Beth Cheung MD; Toni BONDS .. Spoke with: Wilfredo Escalera who states she is doing good. Denies chest pain, SOB, nausea, fever, diizziness. She is using oxygen 2 LPM via nasal cannula. Has a cough. Is eating and dr inking well normal bowel and bladder elimination. [facility] has lucio to home. She gets PICC line removed tomorrow. Medications reviewed and taking all as directed has no concerns    Facility: [facility]    Non-face-to-face services provided:  Obtained and reviewed discharge summary and/or continuity of care documents  Transitions of Care Initial Call    Was this an external facility discharge? No     Challenges to be reviewed by the provider   Additional needs identified to be addressed with provider: No  none             Method of communication with provider : none      Advance Care Planning:   Does patient have an Advance Directive: reviewed and current, reviewed and needs to be updated, not on file; education provided, patient declined education, decision maker updated and referral to internal ACP facilitator. Was this a readmission? No   Patient stated reason for admission: Atypical pneumonia    Patients top risk factors for readmission: functional physical ability  lack of knowledge about disease  medication management    Care Transition Nurse (CTN) contacted the patient by telephone to perform post hospital discharge assessment. Verified name and  with patient as identifiers.  Provided introduction to self, and explanation of the CTN role. CTN reviewed discharge instructions, medical action plan and red flags with patient who verbalized understanding. Patient given an opportunity to ask questions and does not have any further questions or concerns at this time. Were discharge instructions available to patient? Yes. Reviewed appropriate site of care based on symptoms and resources available to patient including: PCP and Specialist. The patient agrees to contact the PCP office for questions related to their healthcare. Medication reconciliation was performed with patient, who verbalizes understanding of administration of home medications. Advised obtaining a 90-day supply of all daily and as-needed medications. Covid Risk Education     Educated patient about risk for severe COVID-19 due to risk factors according to CDC guidelines. LPN CC reviewed discharge instructions, medical action plan and red flag symptoms with the patient who verbalized understanding. Discussed COVID vaccination status: Yes. Education provided on COVID-19 vaccination as appropriate. Discussed exposure protocols and quarantine with CDC Guidelines. Patient was given an opportunity to verbalize any questions and concerns and agrees to contact LPN CC or health care provider for questions related to their healthcare. Reviewed and educated patient on any new and changed medications related to discharge diagnosis. Was patient discharged with a pulse oximeter? No Discussed and confirmed pulse oximeter discharge instructions and when to notify provider or seek emergency care. LPN CC provided contact information. No further follow-up call indicated based on severity of symptoms and risk factors.       Care Transitions 24 Hour Call    Do you have any ongoing symptoms?: Yes  Patient-reported symptoms: Cough  Interventions for patient-reported symptoms: Notified Home Care  Do you have a copy of your discharge instructions?: Yes  Do you have all of your prescriptions and are they filled?: Yes  Have you been contacted by a [facility] [address]?: No  Have you scheduled your follow up appointment?: Yes  How are you going to get to your appointment?: Car - family or friend to transport  Were you discharged with any Home Care or Post Acute Services: Yes  Post Acute Services: [address] (Comment: [place])  Do you feel like you have everything you need to keep you well at home?: Yes  Care Transitions Interventions         Follow Up  Future Appointments   Date Time Provider Marietta Leon   3/16/2022 12:15 PM DO LASHONDA CarvajalF PULM [facility]   2/7/2023 10:00 AM Bettey Bloch, MD TIFF CARD [facility]       Svitlana Champagne LPN

## 2022-02-15 NOTE — PROGRESS NOTES
Physician Progress Note      PATIENT:               Cheikh Villavicencio  CSN #:                  [id number]  :                       1945  ADMIT DATE:       2022 10:49 AM  DISCH DATE:        2/10/2022 6:45 PM  RESPONDING  PROVIDER #:        Baron Leonard MD          QUERY TEXT:    Admitted 22   Discharged 2/10/22  Per H&P: ICU admission because of Sepsis and acute respiratory failure due to   atypical pneumonia  There is not documentation about sepsis in subsequent attending progress notes   or discharge summary. If possible, please document in the progress notes and discharge summary if   sepsis was: The medical record reflects the following:  Risk Factors: hx Covid 19 / intubation, frequent admissions for pneumonia,   most recent  1/15/22 to 22. Clinical Indicators:worsening SOB / acute respiratory failure; Admission WBC   10.4-> 14.6 on 22, lactic acid 3.2-> 2.5;    ED arrival  T 99, , RR   24  Treatment: IV NS bolus 2000 ml in ED,  Rocephin / Azithromycin -> Mina Mari RN, [address] [place] [address]   [phone]  . Options provided:  -- Sepsis confirmed after study  -- Sepsis  ruled out after study  -- Other - I will add my own diagnosis  -- Disagree - Not applicable / Not valid  -- Disagree - Clinically unable to determine / Unknown  -- Refer to Clinical Documentation Reviewer    PROVIDER RESPONSE TEXT:    Sepsis ruled out after study.     Query created by: Lucina Watkins on 2022 1:27 PM      Electronically signed by:  Baron Leonard MD 2/15/2022 8:35 AM

## 2022-02-28 NOTE — PROGRESS NOTES
Subjective:      Patient ID: Marco Mack is a 77 y.o. female. HPI  Patient is here as a post hospital follow-up. Patient was seen virtually on 2/6/2022 per Dr. Sirisha Palmer. Patient has a history of COVID-19 pneumonia in December 2020. Since she was initially treated in December 2020 patient has had recurrent issues with pneumonia. Patient also has a history of rheumatoid arthritis and is on Plaquenil. Patient with a hiatal hernia, she reports that she frequently has episodes \"upchucking\". This is likely precipitating her frequent issues with pneumonia. Likely aspiration pneumonia. She endorses shortness of breath with activity, cough that is occasionally productive of yellow sputum. Denies any significant weight loss. She follows with a gastroenterologist in [place]. Reports that she recently had an EGD, unable to review those records. Patient advised to keep following up with the gastroenterology. She reports significant symptoms of acid reflux even on Protonix. Patient advised to have smaller meals and to remain upright after eating for at least 90 minutes and to keep a dietary log of her dietary triggers for acid reflux management. Patient may need evaluation by general surgery for surgical intervention if she continues to have issues with vomiting and aspiration pneumonia in the setting of a hiatal hernia. Patient is reluctant for any surgical intervention. Fibrotic changes noted on her CT scan are likely multifactorial due to her history of rheumatoid arthritis, COVID and frequent infection from aspiration pneumonia. Medications:   Anoro  Duoneb  Albuterol HFA  Protonix      PRIOR WORKUP:  PFT:  None on chart    CT Imaging:  CT chest 2/5/2022: Stable mild mediastinal adenopathy. Moderate calcified CAD is noted. Interval improvement of left lower lobe consolidation but worsening consolidation and patchy groundglass opacities of the right lower lobe and lower right upper lobe. Extensive interstitial and scattered groundglass opacities with a more peripheral distribution are again seen with some traction bronchiectasis, bronchial wall thickening and interstitial abnormalities. Some residual focal consolidation at the medial left base. No pulmonary edema, pleural effusion or pneumothorax. Negative for pulmonary embolism. CT chest 1/15/2022: Patchy bilateral interstitial, airspace and groundglass infiltrates most notably in the lung bases with underlying biapical and bibasilar fibrosis. No suspicious pulmonary masses are noted. Previously noted right lower lobe nodule is obscured by infiltrates. No pleural effusion. CT chest 5/20/2021: Mediastinal and right hilar lymphadenopathy is noted overall improvement in the interim. No pleural effusion or pneumothorax. There is linear scarring/atelectasis in the right lower lobe with patchy consolidation within the right lower and upper lobes. Some groundglass opacity in the left upper lobe. With a 7 mm noncalcified right lower lobe nodule. CT chest 10/22/2020: Negative for pulmonary embolism. No evidence of bulky mediastinal adenopathy. Few mildly enlarged nodes noted. Extensive bilateral patchy and somewhat nodular groundglass/airspace opacities identified with some consolidation both lower lobes, especially posteriorly associated with volume loss and air bronchograms with some interstitial changes and perhaps tree-in-bud formation noted in the right upper lobe. Mostly groundglass opacity left upper lobe with no focal consolidation or pulmonary edema. No evidence of pleural effusion or pneumothorax. CT chest 12/28/2018 - for pulmonary embolism. Mild hilar retraction in the right with areas of parenchymal scarring. Patchy nodular infiltrate in the peribronchial distribution in the right lower lobe suggesting bronchiolitis. Some patchy groundglass opacification in the left upper lobe perihilar region.   No pleural effusion or pneumothorax. Sleep Study:  None on chart    Laboratory Evaluation:  Rheumatoid factor 2020:<10  Rheumatoid factor 2018:<10  Rheumatoid factor 2005:7  Rheumatoid factor 3/4/2005: 7      Immunizations:   Immunization History   Administered Date(s) Administered    COVID-19, Bruno Mitfred, Primary or Immunocompromised, PF, 100mcg/0.5mL 2021, 2021, 10/25/2021    Influenza, High Dose (Fluzone 65 yrs and older) 10/04/2018    Pneumococcal Conjugate 13-valent ([id number]) 2018    Pneumococcal Polysaccharide ([id number]) 2014        No flowsheet data found.     /84   Pulse 78   Temp 98.2 °F (36.8 °C)   Resp 20   Ht 5' 5\" (1.651 m)   Wt 154 lb 4.8 oz (70 kg)   SpO2 95%   BMI 25.68 kg/m²     Past Medical History:   Diagnosis Date    Chronic pain syndrome     dilaudid infusion pump    COPD [facility]chronic obstructive pulmonary disease) ([facility])     Depression     GERD (gastroesophageal reflux disease)     Hypothyroidism     Osteoporosis      Past Surgical History:   Procedure Laterality Date    BACK SURGERY      BREAST SURGERY      CARPAL TUNNEL RELEASE      FRACTURE SURGERY Left 2018    ORIF wrist    HYSTERECTOMY      JOINT REPLACEMENT      right knee    JOINT REPLACEMENT      WRIST FRACTURE SURGERY Left 2018    WRIST OPEN REDUCTION INTERNAL FIXATION-DISTAL RADIUS performed by Dank Hou MD at [address]     Family History   Problem Relation Age of Onset    Heart Attack Father 61    Heart Attack Sister     Heart Disease Brother        Social History     Socioeconomic History    Marital status:      Spouse name: Not on file    Number of children: Not on file    Years of education: Not on file    Highest education level: Not on file   Occupational History    Not on file   Tobacco Use    Smoking status: Former Smoker     Packs/day: 1.00     Years: 10.00     Pack years: 10.00     Quit date: 1976     Years since quittin.3    Smokeless tobacco: Never Used   Vaping Use    Vaping Use: Never used   Substance and Sexual Activity    Alcohol use: No    Drug use: No    Sexual activity: Not on file   Other Topics Concern    Not on file   Social History Narrative    Not on file     Social Determinants of Health     Financial Resource Strain:     Difficulty of Paying Living Expenses: Not on file   Food Insecurity:     Worried About Running Out of Food in the Last Year: Not on file    Concepcion of Food in the Last Year: Not on file   Transportation Needs:     Lack of Transportation (Medical): Not on file    Lack of Transportation (Non-Medical): Not on file   Physical Activity:     Days of Exercise per Week: Not on file    Minutes of Exercise per Session: Not on file   Stress:     Feeling of Stress : Not on file   Social Connections:     Frequency of Communication with Friends and Family: Not on file    Frequency of Social Gatherings with Friends and Family: Not on file    Attends Druze Services: Not on file    Active Member of [address] [place] [address] or Organizations: Not on file    Attends Club or Organization Meetings: Not on file    Marital Status: Not on file   Intimate Partner Violence:     Fear of Current or Ex-Partner: Not on file    Emotionally Abused: Not on file    Physically Abused: Not on file    Sexually Abused: Not on file   Housing Stability:     Unable to Pay for Housing in the Last Year: Not on file    Number of [place] in the Last Year: Not on file    Unstable Housing in the Last Year: Not on file       Review of Systems   Constitutional:        Decreased activity tolerance secondary to exertional dyspnea   HENT:        Occasional cough productive of yellow sputum   Respiratory:        Shortness of breath with exertion. Cardiovascular: Negative. Gastrointestinal:        Acid reflux and hiatal hernia with frequent vomiting   Endocrine: Negative. Genitourinary: Negative. Musculoskeletal: Negative.     Skin: Negative. Allergic/Immunologic: Negative. Neurological: Negative. Hematological: Negative. Psychiatric/Behavioral: Negative. Objective:       Physical Exam  General appearance - alert, well appearing, and in no distress, oriented to person, place, and time and normal appearing weight  Mental status - alert, oriented to person, place, and time, normal mood, behavior, speech, dress, motor activity, and thought processes  Eyes - pupils equal and reactive, extraocular eye movements intact  Ears - not examined  Nose - normal and patent, no erythema, discharge or polyps  Mouth - mucous membranes moist, pharynx normal without lesions  Neck - supple, no significant adenopathy  Chest - clear to auscultation, no wheezes, rales or rhonchi, symmetric air entry  Heart -normal rate, regular rhythm, normal S1, S2, no murmurs, rubs, clicks or gallops  Abdomen - soft, nontender, nondistended, no masses or organomegaly  Neuro- alert, oriented, normal speech, no focal findings or movement disorder noted}  Extremities - peripheral pulses normal, no pedal edema, no clubbing or cyanosis  Skin - normal coloration and turgor, no rashes, no suspicious skin lesions noted     Wt Readings from Last 3 Encounters:   03/16/22 154 lb 4.8 oz (70 kg)   02/10/22 159 lb 9.6 oz (72.4 kg)   02/01/22 155 lb (70.3 kg)       Results for orders placed or performed during the hospital encounter of 02/05/22   Culture, Blood 1    Specimen: Blood   Result Value Ref Range    Specimen Description . BLOOD     Special Requests RAC 10ML     Culture NO GROWTH 5 DAYS    Culture, Blood 1    Specimen: Blood   Result Value Ref Range    Specimen Description . BLOOD     Special Requests RIGHT HAND  3ML     Culture NO GROWTH 5 DAYS    COVID-19, Rapid    Specimen: Nasopharyngeal Swab   Result Value Ref Range    Specimen Description . NASOPHARYNGEAL SWAB     SARS-CoV-2, Rapid Not Detected Not Detected   Respiratory Panel, Molecular, with COVID-19 (Restricted: peds pts or suitable admitted adults)    Specimen: Nasopharyngeal Swab   Result Value Ref Range    Specimen Description . NASOPHARYNGEAL SWAB     Adenovirus PCR Not Detected Not Detected    Coronavirus 229E PCR Not Detected Not Detected    Coronavirus HKU1 PCR Not Detected Not Detected    Coronavirus NL63 PCR Not Detected Not Detected    Coronavirus OC43 PCR Not Detected Not Detected    SARS-CoV-2, PCR Not Detected Not Detected    Human Metapneumovirus PCR Not Detected Not Detected    Rhino/Enterovirus PCR Not Detected Not Detected    Influenza A by PCR Not Detected Not Detected    Influenza A H1 PCR NOT REPORTED Not Detected    Influenza A H1 (2009) PCR NOT REPORTED Not Detected    Influenza A H3 PCR NOT REPORTED Not Detected    Influenza B by PCR Not Detected Not Detected    Parainfluenza 1 PCR Not Detected Not Detected    Parainfluenza 2 PCR Not Detected Not Detected    Parainfluenza 3 PCR Not Detected Not Detected    Parainfluenza 4 PCR Not Detected Not Detected    Resp Syncytial Virus PCR Not Detected Not Detected    Bordetella Parapertussis Not Detected Not Detected    B Pertussis by PCR Not Detected Not Detected    Chlamydia pneumoniae By PCR Not Detected Not Detected    Mycoplasma pneumo by PCR Not Detected Not Detected   CBC Auto Differential   Result Value Ref Range    WBC 10.4 3.5 - 11.3 k/uL    RBC 4.75 3.95 - 5.11 m/uL    Hemoglobin 13.8 11.9 - 15.1 g/dL    Hematocrit 44.6 36.3 - 47.1 %    MCV 93.9 82.6 - 102.9 fL    MCH 29.1 25.2 - 33.5 pg    MCHC 30.9 28.4 - 34.8 g/dL    RDW 15.2 (H) 11.8 - 14.4 %    Platelets 450 782 - 451 k/uL    MPV 9.4 8.1 - 13.5 fL    NRBC Automated 0.0 0.0 per 100 WBC    Differential Type NOT REPORTED     Seg Neutrophils 79 (H) 36 - 65 %    Lymphocytes 13 (L) 24 - 43 %    Monocytes 7 3 - 12 %    Eosinophils % 1 1 - 4 %    Basophils 0 0 - 2 %    Immature Granulocytes 0 0 %    Segs Absolute 8.18 (H) 1.50 - 8.10 k/uL    Absolute Lymph # 1.32 1.10 - 3.70 k/uL    Absolute Mono # 0.75 0.10 - 1.20 k/uL    Absolute Eos # 0.08 0.00 - 0.44 k/uL    Basophils Absolute <0.03 0.00 - 0.20 k/uL    Absolute Immature Granulocyte 0.03 0.00 - 0.30 k/uL    WBC Morphology NOT REPORTED     RBC Morphology NOT REPORTED     Platelet Estimate NOT REPORTED    Comprehensive Metabolic Panel w/ Reflex to MG   Result Value Ref Range    Glucose 90 70 - 99 mg/dL    BUN 25 (H) 8 - 23 mg/dL    CREATININE 1.05 (H) 0.50 - 0.90 mg/dL    Bun/Cre Ratio 24 (H) 9 - 20    Calcium 9.3 8.6 - 10.4 mg/dL    Sodium 140 135 - 144 mmol/L    Potassium 3.0 (L) 3.7 - 5.3 mmol/L    Chloride 98 98 - 107 mmol/L    CO2 33 (H) 20 - 31 mmol/L    Anion Gap 9 9 - 17 mmol/L    Alkaline Phosphatase 53 35 - 104 U/L    ALT 15 5 - 33 U/L    AST 22 <32 U/L    Total Bilirubin 0.52 0.3 - 1.2 mg/dL    Total Protein 6.2 (L) 6.4 - 8.3 g/dL    Albumin 3.9 3.5 - 5.2 g/dL    Albumin/Globulin Ratio 1.7 1.0 - 2.5    GFR Non- 51 (L) >60 mL/min    GFR African American >60 >60 mL/min    GFR Comment          GFR Staging         Lactic acid, plasma   Result Value Ref Range    Lactic Acid 3.2 (H) 0.5 - 2.2 mmol/L    Lactic Acid, Whole Blood NOT REPORTED 0.7 - 2.1 mmol/L   Brain Natriuretic Peptide   Result Value Ref Range    Pro- <300 pg/mL    BNP Interpretation NOT REPORTED    Troponin   Result Value Ref Range    Troponin, High Sensitivity 105 (HH) 0 - 14 ng/L    Troponin T NOT REPORTED <0.03 ng/mL    Troponin Interp NOT REPORTED    Magnesium   Result Value Ref Range    Magnesium 1.8 1.6 - 2.6 mg/dL   Troponin   Result Value Ref Range    Troponin, High Sensitivity 108 (HH) 0 - 14 ng/L    Troponin T NOT REPORTED <0.03 ng/mL    Troponin Interp NOT REPORTED    Lactic acid, plasma   Result Value Ref Range    Lactic Acid 2.5 (H) 0.5 - 2.2 mmol/L    Lactic Acid, Whole Blood NOT REPORTED 0.7 - 2.1 mmol/L   Basic Metabolic Panel w/ Reflex to MG   Result Value Ref Range    Glucose 125 (H) 70 - 99 mg/dL    BUN 23 8 - 23 mg/dL    CREATININE 0.55 0.50 - 0.90 mg/dL    Bun/Cre Ratio 42 (H) 9 - 20    Calcium 9.2 8.6 - 10.4 mg/dL    Sodium 135 135 - 144 mmol/L    Potassium 4.5 3.7 - 5.3 mmol/L    Chloride 101 98 - 107 mmol/L    CO2 28 20 - 31 mmol/L    Anion Gap 6 (L) 9 - 17 mmol/L    GFR Non-African American >60 >60 mL/min    GFR African American >60 >60 mL/min    GFR Comment          GFR Staging         CBC auto differential   Result Value Ref Range    WBC 14.6 (H) 3.5 - 11.3 k/uL    RBC 4.02 3.95 - 5.11 m/uL    Hemoglobin 11.9 11.9 - 15.1 g/dL    Hematocrit 37.6 36.3 - 47.1 %    MCV 93.5 82.6 - 102.9 fL    MCH 29.6 25.2 - 33.5 pg    MCHC 31.6 28.4 - 34.8 g/dL    RDW 15.6 (H) 11.8 - 14.4 %    Platelets 005 685 - 100 k/uL    MPV 9.5 8.1 - 13.5 fL    NRBC Automated 0.0 0.0 per 100 WBC    Differential Type NOT REPORTED     WBC Morphology NOT REPORTED     RBC Morphology NOT REPORTED     Platelet Estimate NOT REPORTED     Seg Neutrophils 92 (H) 36 - 65 %    Lymphocytes 6 (L) 24 - 43 %    Monocytes 2 (L) 3 - 12 %    Eosinophils % 0 (L) 1 - 4 %    Immature Granulocytes 0 0 %    Basophils 0 0 - 2 %    Segs Absolute 13.43 (H) 1.50 - 8.10 k/uL    Absolute Lymph # 0.88 (L) 1.10 - 3.70 k/uL    Absolute Mono # 0.29 0.10 - 1.20 k/uL    Absolute Eos # 0.00 0.00 - 0.44 k/uL    Absolute Immature Granulocyte 0.00 0.00 - 0.30 k/uL    Basophils Absolute 0.00 0.0 - 0.2 k/uL    Morphology Normal    Blood Gas, Arterial   Result Value Ref Range    pH, Arterial 7.398 7.35 - 7.45    pCO2, Arterial 54.3 (H) 35 - 45 mmHg    pO2, Arterial 75.9 (L) 80 - 100 mmHg    HCO3, Arterial 32.7 (H) 22 - 26 mmol/L    Positive Base Excess, Art 6.2 (H) 0.0 - 2.0 mmol/L    Negative Base Excess, Art NOT REPORTED 0.0 - 2.0 mmol/L    O2 Sat, Arterial 95.0 95 - 98 %    Total Hb NOT REPORTED 12.0 - 16.0 g/dl    Oxyhemoglobin NOT REPORTED 95.0 - 98.0 %    Carboxyhemoglobin NOT REPORTED 0 - 5 %    Methemoglobin NOT REPORTED 0.0 - 1.9 %    Pt Temp 37.0     pH, Art, Temp Adj NOT REPORTED 7.350 - 7.450    pCO2, Art, Temp Adj NOT REPORTED     pO2, Art, Temp Adj NOT REPORTED 80.0 - 100.0 mmHg    O2 Device/Flow/% Cannula     Respiratory Rate NOT REPORTED     Marty Test PASS     Sample Site Right Radial Artery     Pt.  Position NOT REPORTED     Mode NOT REPORTED     Set Rate NOT REPORTED     Total Rate NOT REPORTED     VT NOT REPORTED     FIO2 NOT REPORTED     Peep/Cpap NOT REPORTED     PSV NOT REPORTED     Text for Respiratory NOT REPORTED     NOTIFICATION NOT REPORTED     NOTIFICATION TIME NOT REPORTED    Lactic acid, plasma   Result Value Ref Range    Lactic Acid 2.0 0.5 - 2.2 mmol/L    Lactic Acid, Whole Blood NOT REPORTED 0.7 - 2.1 mmol/L   Troponin   Result Value Ref Range    Troponin, High Sensitivity 27 (H) 0 - 14 ng/L    Troponin T NOT REPORTED <0.03 ng/mL    Troponin Interp NOT REPORTED    Basic Metabolic Panel w/ Reflex to MG   Result Value Ref Range    Glucose 83 70 - 99 mg/dL    BUN 17 8 - 23 mg/dL    CREATININE 0.70 0.50 - 0.90 mg/dL    Bun/Cre Ratio 24 (H) 9 - 20    Calcium 9.8 8.6 - 10.4 mg/dL    Sodium 139 135 - 144 mmol/L    Potassium 4.4 3.7 - 5.3 mmol/L    Chloride 99 98 - 107 mmol/L    CO2 33 (H) 20 - 31 mmol/L    Anion Gap 7 (L) 9 - 17 mmol/L    GFR Non-African American >60 >60 mL/min    GFR African American >60 >60 mL/min    GFR Comment          GFR Staging         CBC auto differential   Result Value Ref Range    WBC 11.3 3.5 - 11.3 k/uL    RBC 3.76 (L) 3.95 - 5.11 m/uL    Hemoglobin 11.1 (L) 11.9 - 15.1 g/dL    Hematocrit 36.1 (L) 36.3 - 47.1 %    MCV 96.0 82.6 - 102.9 fL    MCH 29.5 25.2 - 33.5 pg    MCHC 30.7 28.4 - 34.8 g/dL    RDW 15.9 (H) 11.8 - 14.4 %    Platelets 010 857 - 115 k/uL    MPV 9.5 8.1 - 13.5 fL    NRBC Automated 0.0 0.0 per 100 WBC    Differential Type NOT REPORTED     Seg Neutrophils 82 (H) 36 - 65 %    Lymphocytes 12 (L) 24 - 43 %    Monocytes 5 3 - 12 %    Eosinophils % 1 1 - 4 %    Basophils 0 0 - 2 %    Immature Granulocytes 0 0 %    Segs Absolute 9.19 (H) 1.50 - 8.10 k/uL    Absolute Lymph # 1. 31 1.10 - 3.70 k/uL    Absolute Mono # 0.56 0.10 - 1.20 k/uL    Absolute Eos # 0.14 0.00 - 0.44 k/uL    Basophils Absolute <0.03 0.00 - 0.20 k/uL    Absolute Immature Granulocyte 0.05 0.00 - 0.30 k/uL    WBC Morphology NOT REPORTED     RBC Morphology NOT REPORTED     Platelet Estimate NOT REPORTED    Basic Metabolic Panel w/ Reflex to MG   Result Value Ref Range    Glucose 80 70 - 99 mg/dL    BUN 12 8 - 23 mg/dL    CREATININE 0.55 0.50 - 0.90 mg/dL    Bun/Cre Ratio 22 (H) 9 - 20    Calcium 10.5 (H) 8.6 - 10.4 mg/dL    Sodium 146 (H) 135 - 144 mmol/L    Potassium 4.4 3.7 - 5.3 mmol/L    Chloride 103 98 - 107 mmol/L    CO2 33 (H) 20 - 31 mmol/L    Anion Gap 10 9 - 17 mmol/L    GFR Non-African American >60 >60 mL/min    GFR African American >60 >60 mL/min    GFR Comment          GFR Staging         CBC auto differential   Result Value Ref Range    WBC 7.2 3.5 - 11.3 k/uL    RBC 4.15 3.95 - 5.11 m/uL    Hemoglobin 12.4 11.9 - 15.1 g/dL    Hematocrit 39.5 36.3 - 47.1 %    MCV 95.2 82.6 - 102.9 fL    MCH 29.9 25.2 - 33.5 pg    MCHC 31.4 28.4 - 34.8 g/dL    RDW 15.5 (H) 11.8 - 14.4 %    Platelets 844 628 - 400 k/uL    MPV 9.1 8.1 - 13.5 fL    NRBC Automated 0.0 0.0 per 100 WBC    Differential Type NOT REPORTED     WBC Morphology NOT REPORTED     RBC Morphology NOT REPORTED     Platelet Estimate NOT REPORTED     Seg Neutrophils 75 (H) 36 - 65 %    Lymphocytes 16 (L) 24 - 43 %    Monocytes 6 3 - 12 %    Eosinophils % 2 1 - 4 %    Basophils 0 0 - 2 %    Immature Granulocytes 1 (H) 0 %    Segs Absolute 5.39 1.50 - 8.10 k/uL    Absolute Lymph # 1.17 1.10 - 3.70 k/uL    Absolute Mono # 0.43 0.10 - 1.20 k/uL    Absolute Eos # 0.11 0.00 - 0.44 k/uL    Basophils Absolute <0.03 0.00 - 0.20 k/uL    Absolute Immature Granulocyte 0.04 0.00 - 0.30 k/uL   Basic Metabolic Panel w/ Reflex to MG   Result Value Ref Range    Glucose 81 70 - 99 mg/dL    BUN 11 8 - 23 mg/dL    CREATININE 0.55 0.50 - 0.90 mg/dL    Bun/Cre Ratio 20 9 - 20 Calcium 10.3 8.6 - 10.4 mg/dL    Sodium 142 135 - 144 mmol/L    Potassium 3.9 3.7 - 5.3 mmol/L    Chloride 101 98 - 107 mmol/L    CO2 32 (H) 20 - 31 mmol/L    Anion Gap 9 9 - 17 mmol/L    GFR Non-African American >60 >60 mL/min    GFR African American >60 >60 mL/min    GFR Comment          GFR Staging         CBC auto differential   Result Value Ref Range    WBC 4.7 3.5 - 11.3 k/uL    RBC 4.00 3.95 - 5.11 m/uL    Hemoglobin 11.7 (L) 11.9 - 15.1 g/dL    Hematocrit 37.8 36.3 - 47.1 %    MCV 94.5 82.6 - 102.9 fL    MCH 29.3 25.2 - 33.5 pg    MCHC 31.0 28.4 - 34.8 g/dL    RDW 15.4 (H) 11.8 - 14.4 %    Platelets 124 988 - 460 k/uL    MPV 9.5 8.1 - 13.5 fL    NRBC Automated 0.0 0.0 per 100 WBC    Differential Type NOT REPORTED     WBC Morphology NOT REPORTED     RBC Morphology NOT REPORTED     Platelet Estimate NOT REPORTED     Seg Neutrophils 53 36 - 65 %    Lymphocytes 36 24 - 43 %    Monocytes 9 3 - 12 %    Eosinophils % 2 1 - 4 %    Basophils 0 0 - 2 %    Immature Granulocytes 1 (H) 0 %    Segs Absolute 2.49 1.50 - 8.10 k/uL    Absolute Lymph # 1.68 1.10 - 3.70 k/uL    Absolute Mono # 0.40 0.10 - 1.20 k/uL    Absolute Eos # 0.07 0.00 - 0.44 k/uL    Basophils Absolute <0.03 0.00 - 0.20 k/uL    Absolute Immature Granulocyte 0.03 0.00 - 0.30 k/uL   Glucose, Whole Blood   Result Value Ref Range    POC Glucose 142 (H) 74 - 100 mg/dL   Basic Metabolic Panel w/ Reflex to MG   Result Value Ref Range    Glucose 88 70 - 99 mg/dL    BUN 14 8 - 23 mg/dL    CREATININE 0.70 0.50 - 0.90 mg/dL    Bun/Cre Ratio 20 9 - 20    Calcium 10.5 (H) 8.6 - 10.4 mg/dL    Sodium 142 135 - 144 mmol/L    Potassium 3.8 3.7 - 5.3 mmol/L    Chloride 96 (L) 98 - 107 mmol/L    CO2 35 (H) 20 - 31 mmol/L    Anion Gap 11 9 - 17 mmol/L    GFR Non-African American >60 >60 mL/min    GFR African American >60 >60 mL/min    GFR Comment          GFR Staging         CBC auto differential   Result Value Ref Range    WBC 5.0 3.5 - 11.3 k/uL    RBC 4.34 3.95 - 5.11 m/uL    Hemoglobin 12.8 11.9 - 15.1 g/dL    Hematocrit 40.6 36.3 - 47.1 %    MCV 93.5 82.6 - 102.9 fL    MCH 29.5 25.2 - 33.5 pg    MCHC 31.5 28.4 - 34.8 g/dL    RDW 15.0 (H) 11.8 - 14.4 %    Platelets 622 803 - 325 k/uL    MPV 9.2 8.1 - 13.5 fL    NRBC Automated 0.0 0.0 per 100 WBC    Differential Type NOT REPORTED     Seg Neutrophils 48 36 - 65 %    Lymphocytes 38 24 - 43 %    Monocytes 11 3 - 12 %    Eosinophils % 1 1 - 4 %    Basophils 0 0 - 2 %    Immature Granulocytes 2 (H) 0 %    Segs Absolute 2.40 1.50 - 8.10 k/uL    Absolute Lymph # 1.88 1.10 - 3.70 k/uL    Absolute Mono # 0.53 0.10 - 1.20 k/uL    Absolute Eos # 0.07 0.00 - 0.44 k/uL    Basophils Absolute <0.03 0.00 - 0.20 k/uL    Absolute Immature Granulocyte 0.09 0.00 - 0.30 k/uL    WBC Morphology NOT REPORTED     RBC Morphology NOT REPORTED     Platelet Estimate NOT REPORTED    EKG 12 Lead   Result Value Ref Range    Ventricular Rate 110 BPM    Atrial Rate 110 BPM    P-R Interval 166 ms    QRS Duration 78 ms    Q-T Interval 340 ms    QTc Calculation (Bazett) 460 ms    P Axis 52 degrees    R Axis -31 degrees    T Axis 43 degrees       Echo 2D w doppler w color complete    Result Date: 2/1/2022  [facility] Transthoracic Echocardiography Report (TTE)  Patient Name [facility]    Date of Study               02/01/2022               Gaston Kehr   Date of      1945  Gender                      Female  Birth   Age          77 year(s)  Race                           Room Number              Height:                     64 inch, 162.56 cm   Corporate ID [id number]    Weight:                     155 pounds, 70.3 kg  #   Patient Acct [de-identified]   BSA:          1.76 m^2      BMI:     26.61 kg/m^2  #   MR #         [id number]      Sonographer                 Bess Terrell   Accession #  [id number]  Interpreting Physician      Jose Miner   Fellow                   Referring Nurse                           Practitioner   Interpreting             Referring Physician         Jose Miner  Fellow  Type of Study   TTE procedure:2D Echocardiogram, M-Mode, Doppler, Color Doppler. Procedure Date Date: 02/01/2022 Start: 03:01 PM Study Location: [facility] Indications:Congestive heart failure, combined systolic and diastolic dysfunction. History / Tech. Comments: Dx: chronic combined diastolic and systolic CHF, SOB Hx: AFIB, COPD Patient Status: Outpatient Height: 64 inches Weight: 155 pounds BSA: 1.76 m^2 BMI: 26.61 kg/m^2 BP: 131/70 mmHg Allergies   - *No Known Allergies. CONCLUSIONS Summary Global left ventricular systolic function appears preserved with an estimated ejection fraction of 50-55%. The left ventricular cavity size is within normal limits and the left ventricular wall thickness is mildly increased. The left atrium is severely dilated (>40) with a left atrial volume index of 43 ml/m2. Mild mitral and tricuspid regurgitation. Mild pulmonary hypertension with an estimated right ventricular systolic pressure of 32 mmHg. Evidence of mild (grade I) diastolic dysfunction is seen. Signature ----------------------------------------------------------------------------  Electronically signed by Jessy Fields(Sonographer) on 02/01/2022 03:37 PM ---------------------------------------------------------------------------- ----------------------------------------------------------------------------  Electronically signed by Jose Miner(Interpreting physician) on 02/01/2022  04:56 PM ---------------------------------------------------------------------------- FINDINGS Left Atrium The left atrium is severely dilated (>40) with a left atrial volume index of 43 ml/m2. Left Ventricle Global left ventricular systolic function appears preserved with an estimated ejection fraction of 50-55%. The left ventricular cavity size is within normal limits and the left ventricular wall thickness is mildly increased. Right Atrium Right atrium is normal in size.  Right Ventricle Normal right ventricular size and function. Mitral Valve Thickened mitral valve leaflets. Mild mitral regurgitation. Aortic Valve Normal aortic valve structure and function without stenosis or regurgitation. Tricuspid Valve Normal tricuspid valve structure with mild tricuspid regurgitation. Mild pulmonary hypertension with an estimated right ventricular systolic pressure of 32 mmHg. Pulmonic Valve The pulmonic valve is normal in structure. Pericardial Effusion No significant pericardial effusion is seen. Miscellaneous Evidence of mild (grade I) diastolic dysfunction is seen.  M-mode / 2D Measurements & Calculations:   LVIDd:4.1 cm(3.7 - 5.6 cm)       Diastolic WHDZYR:55.99612 ml  LVIDs:3.06 cm(2.2 - 4.0 cm)      Systolic DYKQJW:02.5 ml  VUWL:1.71 cm(0.6 - 1.1 cm)       Aortic Root:2.95 cm(2.0 - 3.7 cm)  LVPWd:1.06 cm(0.6 - 1.1 cm)      LA Dimension: 3.53 cm(1.9 - 4.0 cm)  Fractional Shortenin.37 %    LA volume/Index: 74.9 ml /43m^2  Calculated LVEF (%): 54.34 %     LVOT:2 cm                                   RVDd:2.46 cm   Mitral:                                 Aortic   Valve Area (P1/2-Time): 3.12 cm^2       Peak Velocity: 1.42 m/s  Peak E-Wave: 0.71 m/s                   Mean Velocity: 1.16 m/s  Peak A-Wave: 0.82 m/s                   Peak Gradient: 8.04 mmHg  E/A Ratio: 0.87                         Mean Gradient: 5.64 mmHg  Peak Gradient: 2.02 mmHg                                          Acceleration Time: 49.73 msec  P1/2t: 70.5 msec                        Area (continuity): 3.34 cm^2                                          AV VTI: 32.14 cm   Tricuspid:                              Pulmonic:   Estimated RVSP: 32.38 mmHg  Peak TR Velocity: 2.47 m/s  Peak TR Gradient: 24.99439 mmHg  Estimated RA Pressure: 8 mmHg                                          Estimated PASP: 32.38 mmHg  Diastology / Tissue Doppler Lateral Wall E' velocity:0.08 m/s Lateral Wall E/E':8.77    XR CHEST PORTABLE    Result Date: 2/5/2022  EXAMINATION: ONE XRAY VIEW OF THE CHEST 2/5/2022 11:32 am COMPARISON: Chest x-ray from January 15 2015 HISTORY: ORDERING SYSTEM PROVIDED HISTORY: cough TECHNOLOGIST PROVIDED HISTORY: cough FINDINGS: The cardiomediastinal silhouette is enlarged. No pleural effusion or pneumothorax. Patchy interstitial and alveolar opacities, similar to prior study and most significant in the left mid to lower lung. Spinal stimulator. Persistent opacities most significant in the left mid lung and concerning for unchanged multifocal pneumonia. Clinical and imaging follow-up to resolution recommended. CT CHEST PULMONARY EMBOLISM W CONTRAST    Result Date: 2/5/2022  EXAMINATION: CTA OF THE CHEST 2/5/2022 12:21 pm TECHNIQUE: CTA of the chest was performed after the administration of intravenous contrast.  Multiplanar reformatted images are provided for review. MIP images are provided for review. Dose modulation, iterative reconstruction, and/or weight based adjustment of the mA/kV was utilized to reduce the radiation dose to as low as reasonably achievable. COMPARISON: CT scan of the chest for PE from 01/15/2022 HISTORY: ORDERING SYSTEM PROVIDED HISTORY: hypoxemia TECHNOLOGIST PROVIDED HISTORY: hypoxemia Decision Support Exception - unselect if not a suspected or confirmed emergency medical condition->Emergency Medical Condition (MA) FINDINGS: Pulmonary Arteries: Pulmonary arteries are adequately opacified for evaluation. No evidence of intraluminal filling defect to suggest pulmonary embolism. Main pulmonary artery is unchanged in caliber, measuring 30 mm. Mediastinum: Stable mild mediastinal lymphadenopathy. Heart and pericardium demonstrate no acute abnormality or right heart strain; moderate calcified CAD again seen. No acute abnormality of the thoracic aorta. Moderate-large para esophageal hiatus hernia.  Lungs/pleura: Interval improvement left lower lobe consolidation, but worsening consolidation and answered to her satisfaction. 8. Chest x-ray was reviewed  9. CT scan of the chest was reviewed. 10. Patient did follow-up with gastroenterology. Patient states that her physician is in [facility]. No records available for my review. Continues to have symptoms of acid reflux with occasional vomiting. This in the setting of her hiatal hernia increases her risk for aspiration pneumonia. Patient may need surgical evaluation for hiatal hernia if continues to persist.  At this time patient is reluctant for any surgical intervention.   11. We'll see the patient back in 6 months Le          Electronically signed by RYAN Corral CNP on 3/16/2022 at 10:19 AM

## 2022-03-08 ENCOUNTER — HOSPITAL ENCOUNTER (OUTPATIENT)
Age: 77
Discharge: HOME | End: 2022-03-10
Payer: MEDICARE

## 2022-03-08 ENCOUNTER — HOSPITAL ENCOUNTER (OUTPATIENT)
Dept: GENERAL RADIOLOGY | Age: 77
Discharge: HOME | End: 2022-03-10
Payer: MEDICARE

## 2022-03-08 DIAGNOSIS — J15.9 BACTERIAL LOBAR PNEUMONIA: ICD-10-CM

## 2022-03-08 PROCEDURE — 71046 X-RAY EXAM CHEST 2 VIEWS: CPT

## 2022-03-16 ENCOUNTER — OFFICE VISIT (OUTPATIENT)
Dept: PULMONOLOGY | Age: 77
End: 2022-03-16
Payer: MEDICARE

## 2022-03-16 VITALS
RESPIRATION RATE: 20 BRPM | HEART RATE: 78 BPM | WEIGHT: 154.3 LBS | SYSTOLIC BLOOD PRESSURE: 129 MMHG | DIASTOLIC BLOOD PRESSURE: 84 MMHG | TEMPERATURE: 98.2 F | HEIGHT: 65 IN | BODY MASS INDEX: 25.71 KG/M2 | OXYGEN SATURATION: 95 %

## 2022-03-16 DIAGNOSIS — J69.0 ASPIRATION PNEUMONIA, UNSPECIFIED ASPIRATION PNEUMONIA TYPE, UNSPECIFIED LATERALITY, UNSPECIFIED PART OF LUNG (HCC): ICD-10-CM

## 2022-03-16 DIAGNOSIS — K44.9 HIATAL HERNIA: Primary | ICD-10-CM

## 2022-03-16 DIAGNOSIS — K21.9 GASTROESOPHAGEAL REFLUX DISEASE, UNSPECIFIED WHETHER ESOPHAGITIS PRESENT: ICD-10-CM

## 2022-03-16 PROCEDURE — 1036F TOBACCO NON-USER: CPT | Performed by: NURSE PRACTITIONER

## 2022-03-16 PROCEDURE — G8427 DOCREV CUR MEDS BY ELIG CLIN: HCPCS | Performed by: NURSE PRACTITIONER

## 2022-03-16 PROCEDURE — 4040F PNEUMOC VAC/ADMIN/RCVD: CPT | Performed by: NURSE PRACTITIONER

## 2022-03-16 PROCEDURE — 1090F PRES/ABSN URINE INCON ASSESS: CPT | Performed by: NURSE PRACTITIONER

## 2022-03-16 PROCEDURE — 99214 OFFICE O/P EST MOD 30 MIN: CPT | Performed by: NURSE PRACTITIONER

## 2022-03-16 PROCEDURE — G8400 PT W/DXA NO RESULTS DOC: HCPCS | Performed by: NURSE PRACTITIONER

## 2022-03-16 PROCEDURE — 99214 OFFICE O/P EST MOD 30 MIN: CPT

## 2022-03-16 PROCEDURE — 3017F COLORECTAL CA SCREEN DOC REV: CPT | Performed by: NURSE PRACTITIONER

## 2022-03-16 PROCEDURE — 1123F ACP DISCUSS/DSCN MKR DOCD: CPT | Performed by: NURSE PRACTITIONER

## 2022-03-16 PROCEDURE — G8417 CALC BMI ABV UP PARAM F/U: HCPCS | Performed by: NURSE PRACTITIONER

## 2022-03-16 PROCEDURE — G8484 FLU IMMUNIZE NO ADMIN: HCPCS | Performed by: NURSE PRACTITIONER

## 2022-03-16 RX ORDER — UMECLIDINIUM BROMIDE AND VILANTEROL TRIFENATATE 62.5; 25 UG/1; UG/1
POWDER RESPIRATORY (INHALATION)
COMMUNITY
Start: 2021-11-24 | End: 2022-03-16 | Stop reason: SDUPTHER

## 2022-03-16 RX ORDER — IPRATROPIUM BROMIDE AND ALBUTEROL SULFATE 2.5; .5 MG/3ML; MG/3ML
3 SOLUTION RESPIRATORY (INHALATION)
COMMUNITY
Start: 2021-11-24

## 2022-03-16 RX ORDER — ALBUTEROL SULFATE 90 UG/1
2 AEROSOL, METERED RESPIRATORY (INHALATION) 4 TIMES DAILY
Qty: 3 EACH | Refills: 3 | Status: SHIPPED | OUTPATIENT
Start: 2022-03-16

## 2022-03-16 RX ORDER — UMECLIDINIUM BROMIDE AND VILANTEROL TRIFENATATE 62.5; 25 UG/1; UG/1
1 POWDER RESPIRATORY (INHALATION) DAILY
Qty: 3 EACH | Refills: 3 | Status: SHIPPED | OUTPATIENT
Start: 2022-03-16

## 2022-03-16 ASSESSMENT — ENCOUNTER SYMPTOMS: ALLERGIC/IMMUNOLOGIC NEGATIVE: 1

## 2022-03-16 NOTE — PATIENT INSTRUCTIONS
SURVEY:    You may be receiving a survey from [employer] regarding your visit today. Please complete the survey to enable us to provide the highest quality of care to you and your family. If you cannot score us a very good on any question, please call the office to discuss how we could have made your experience a very good one. Thank you. Please recheck your blood pressure when you go home and make sure you take your prescribed medication if applicable . Please follow up with your PCP or ER if needed.

## 2022-04-08 RX ORDER — RIVAROXABAN 20 MG/1
TABLET, FILM COATED ORAL
Qty: 90 TABLET | Refills: 3 | Status: SHIPPED | OUTPATIENT
Start: 2022-04-08 | End: 2022-08-30

## 2022-04-28 ENCOUNTER — HOSPITAL ENCOUNTER (INPATIENT)
Age: 77
LOS: 4 days | Discharge: HOME | DRG: 193 | End: 2022-05-02
Attending: EMERGENCY MEDICINE | Admitting: INTERNAL MEDICINE
Payer: MEDICARE

## 2022-04-28 ENCOUNTER — APPOINTMENT (OUTPATIENT)
Dept: CT IMAGING | Age: 77
DRG: 193 | End: 2022-04-28
Payer: MEDICARE

## 2022-04-28 ENCOUNTER — APPOINTMENT (OUTPATIENT)
Dept: GENERAL RADIOLOGY | Age: 77
DRG: 193 | End: 2022-04-28
Payer: MEDICARE

## 2022-04-28 DIAGNOSIS — J18.9 MULTIFOCAL PNEUMONIA: Primary | ICD-10-CM

## 2022-04-28 PROBLEM — J15.9 COMMUNITY ACQUIRED BACTERIAL PNEUMONIA: Status: ACTIVE | Noted: 2022-04-28

## 2022-04-28 LAB
ABSOLUTE EOS #: 0.08 K/UL (ref 0–0.44)
ABSOLUTE IMMATURE GRANULOCYTE: 0.07 K/UL (ref 0–0.3)
ABSOLUTE LYMPH #: 0.55 K/UL (ref 1.1–3.7)
ABSOLUTE MONO #: 1.08 K/UL (ref 0.1–1.2)
ALBUMIN SERPL-MCNC: 4 G/DL (ref 3.5–5.2)
ALBUMIN/GLOBULIN RATIO: 1.5 (ref 1–2.5)
ALP BLD-CCNC: 70 U/L (ref 35–104)
ALT SERPL-CCNC: 11 U/L (ref 5–33)
ANION GAP SERPL CALCULATED.3IONS-SCNC: 6 MMOL/L (ref 9–17)
AST SERPL-CCNC: 18 U/L
BASOPHILS # BLD: 0 % (ref 0–2)
BASOPHILS ABSOLUTE: 0.04 K/UL (ref 0–0.2)
BILIRUB SERPL-MCNC: 0.58 MG/DL (ref 0.3–1.2)
BUN BLDV-MCNC: 7 MG/DL (ref 8–23)
BUN/CREAT BLD: 10 (ref 9–20)
CALCIUM SERPL-MCNC: 9.7 MG/DL (ref 8.6–10.4)
CHLORIDE BLD-SCNC: 104 MMOL/L (ref 98–107)
CO2: 30 MMOL/L (ref 20–31)
CREAT SERPL-MCNC: 0.68 MG/DL (ref 0.5–0.9)
EOSINOPHILS RELATIVE PERCENT: 1 % (ref 1–4)
FLU A ANTIGEN: NEGATIVE
FLU B ANTIGEN: NEGATIVE
GFR AFRICAN AMERICAN: >60 ML/MIN
GFR NON-AFRICAN AMERICAN: >60 ML/MIN
GFR SERPL CREATININE-BSD FRML MDRD: ABNORMAL ML/MIN/{1.73_M2}
GFR SERPL CREATININE-BSD FRML MDRD: ABNORMAL ML/MIN/{1.73_M2}
GLUCOSE BLD-MCNC: 112 MG/DL (ref 70–99)
HCO3 VENOUS: 32.1 MMOL/L (ref 24–30)
HCT VFR BLD CALC: 42.4 % (ref 36.3–47.1)
HEMOGLOBIN: 13.4 G/DL (ref 11.9–15.1)
IMMATURE GRANULOCYTES: 1 %
LACTIC ACID, SEPSIS: 1.5 MMOL/L (ref 0.5–1.9)
LACTIC ACID, SEPSIS: 1.5 MMOL/L (ref 0.5–1.9)
LACTIC ACID: 1.9 MMOL/L (ref 0.5–2.2)
LYMPHOCYTES # BLD: 4 % (ref 24–43)
MCH RBC QN AUTO: 30.3 PG (ref 25.2–33.5)
MCHC RBC AUTO-ENTMCNC: 31.6 G/DL (ref 28.4–34.8)
MCV RBC AUTO: 95.9 FL (ref 82.6–102.9)
MONOCYTES # BLD: 7 % (ref 3–12)
NRBC AUTOMATED: 0 PER 100 WBC
O2 SAT, VEN: 62.1 % (ref 60–85)
PATIENT TEMP: 37
PCO2, VEN: 55.7 (ref 39–55)
PDW BLD-RTO: 13.7 % (ref 11.8–14.4)
PH VENOUS: 7.38 (ref 7.32–7.42)
PLATELET # BLD: 191 K/UL (ref 138–453)
PMV BLD AUTO: 10.1 FL (ref 8.1–13.5)
PO2, VEN: 33.6 (ref 30–50)
POSITIVE BASE EXCESS, VEN: 5.3 MMOL/L (ref 0–2)
POTASSIUM SERPL-SCNC: 3.8 MMOL/L (ref 3.7–5.3)
RBC # BLD: 4.42 M/UL (ref 3.95–5.11)
SARS-COV-2, RAPID: NOT DETECTED
SEG NEUTROPHILS: 87 % (ref 36–65)
SEGMENTED NEUTROPHILS ABSOLUTE COUNT: 12.9 K/UL (ref 1.5–8.1)
SODIUM BLD-SCNC: 140 MMOL/L (ref 135–144)
SPECIMEN DESCRIPTION: NORMAL
TOTAL PROTEIN: 6.7 G/DL (ref 6.4–8.3)
WBC # BLD: 14.7 K/UL (ref 3.5–11.3)

## 2022-04-28 PROCEDURE — 80053 COMPREHEN METABOLIC PANEL: CPT

## 2022-04-28 PROCEDURE — 94664 DEMO&/EVAL PT USE INHALER: CPT

## 2022-04-28 PROCEDURE — 94669 MECHANICAL CHEST WALL OSCILL: CPT

## 2022-04-28 PROCEDURE — 87040 BLOOD CULTURE FOR BACTERIA: CPT

## 2022-04-28 PROCEDURE — 93005 ELECTROCARDIOGRAM TRACING: CPT | Performed by: EMERGENCY MEDICINE

## 2022-04-28 PROCEDURE — 6360000002 HC RX W HCPCS: Performed by: EMERGENCY MEDICINE

## 2022-04-28 PROCEDURE — 6370000000 HC RX 637 (ALT 250 FOR IP): Performed by: INTERNAL MEDICINE

## 2022-04-28 PROCEDURE — 85025 COMPLETE CBC W/AUTO DIFF WBC: CPT

## 2022-04-28 PROCEDURE — 71250 CT THORAX DX C-: CPT

## 2022-04-28 PROCEDURE — 82805 BLOOD GASES W/O2 SATURATION: CPT

## 2022-04-28 PROCEDURE — 6370000000 HC RX 637 (ALT 250 FOR IP): Performed by: EMERGENCY MEDICINE

## 2022-04-28 PROCEDURE — 92610 EVALUATE SWALLOWING FUNCTION: CPT

## 2022-04-28 PROCEDURE — 87635 SARS-COV-2 COVID-19 AMP PRB: CPT

## 2022-04-28 PROCEDURE — 71045 X-RAY EXAM CHEST 1 VIEW: CPT

## 2022-04-28 PROCEDURE — 96365 THER/PROPH/DIAG IV INF INIT: CPT

## 2022-04-28 PROCEDURE — 36415 COLL VENOUS BLD VENIPUNCTURE: CPT

## 2022-04-28 PROCEDURE — 2580000003 HC RX 258: Performed by: INTERNAL MEDICINE

## 2022-04-28 PROCEDURE — 94640 AIRWAY INHALATION TREATMENT: CPT

## 2022-04-28 PROCEDURE — 2700000000 HC OXYGEN THERAPY PER DAY

## 2022-04-28 PROCEDURE — 87804 INFLUENZA ASSAY W/OPTIC: CPT

## 2022-04-28 PROCEDURE — 2580000003 HC RX 258: Performed by: EMERGENCY MEDICINE

## 2022-04-28 PROCEDURE — 1200000000 HC SEMI PRIVATE

## 2022-04-28 PROCEDURE — 99285 EMERGENCY DEPT VISIT HI MDM: CPT

## 2022-04-28 PROCEDURE — 83605 ASSAY OF LACTIC ACID: CPT

## 2022-04-28 PROCEDURE — 94761 N-INVAS EAR/PLS OXIMETRY MLT: CPT

## 2022-04-28 PROCEDURE — 96367 TX/PROPH/DG ADDL SEQ IV INF: CPT

## 2022-04-28 PROCEDURE — 97166 OT EVAL MOD COMPLEX 45 MIN: CPT

## 2022-04-28 RX ORDER — ACETAMINOPHEN 325 MG/1
650 TABLET ORAL EVERY 6 HOURS PRN
Status: DISCONTINUED | OUTPATIENT
Start: 2022-04-28 | End: 2022-05-02 | Stop reason: HOSPADM

## 2022-04-28 RX ORDER — ALBUTEROL SULFATE 90 UG/1
2 AEROSOL, METERED RESPIRATORY (INHALATION) 4 TIMES DAILY
Status: DISCONTINUED | OUTPATIENT
Start: 2022-04-28 | End: 2022-04-28

## 2022-04-28 RX ORDER — ONDANSETRON 2 MG/ML
4 INJECTION INTRAMUSCULAR; INTRAVENOUS EVERY 6 HOURS PRN
Status: DISCONTINUED | OUTPATIENT
Start: 2022-04-28 | End: 2022-05-02 | Stop reason: HOSPADM

## 2022-04-28 RX ORDER — POLYETHYLENE GLYCOL 3350 17 G/17G
17 POWDER, FOR SOLUTION ORAL DAILY PRN
Status: DISCONTINUED | OUTPATIENT
Start: 2022-04-28 | End: 2022-05-02 | Stop reason: HOSPADM

## 2022-04-28 RX ORDER — ONDANSETRON 4 MG/1
4 TABLET, ORALLY DISINTEGRATING ORAL EVERY 8 HOURS PRN
Status: DISCONTINUED | OUTPATIENT
Start: 2022-04-28 | End: 2022-05-02 | Stop reason: HOSPADM

## 2022-04-28 RX ORDER — DULOXETIN HYDROCHLORIDE 60 MG/1
60 CAPSULE, DELAYED RELEASE ORAL DAILY
Status: DISCONTINUED | OUTPATIENT
Start: 2022-04-29 | End: 2022-05-02 | Stop reason: HOSPADM

## 2022-04-28 RX ORDER — FUROSEMIDE 20 MG/1
20 TABLET ORAL DAILY
Status: DISCONTINUED | OUTPATIENT
Start: 2022-04-29 | End: 2022-05-02 | Stop reason: HOSPADM

## 2022-04-28 RX ORDER — AZITHROMYCIN 250 MG/1
500 TABLET, FILM COATED ORAL EVERY 24 HOURS
Status: COMPLETED | OUTPATIENT
Start: 2022-04-29 | End: 2022-05-01

## 2022-04-28 RX ORDER — ASPIRIN 81 MG/1
81 TABLET, CHEWABLE ORAL DAILY
Status: DISCONTINUED | OUTPATIENT
Start: 2022-04-29 | End: 2022-05-02 | Stop reason: HOSPADM

## 2022-04-28 RX ORDER — PANTOPRAZOLE SODIUM 40 MG/1
40 TABLET, DELAYED RELEASE ORAL
Status: DISCONTINUED | OUTPATIENT
Start: 2022-04-29 | End: 2022-05-02 | Stop reason: HOSPADM

## 2022-04-28 RX ORDER — IPRATROPIUM BROMIDE AND ALBUTEROL SULFATE 2.5; .5 MG/3ML; MG/3ML
3 SOLUTION RESPIRATORY (INHALATION) EVERY 4 HOURS PRN
Status: DISCONTINUED | OUTPATIENT
Start: 2022-04-28 | End: 2022-04-28

## 2022-04-28 RX ORDER — 0.9 % SODIUM CHLORIDE 0.9 %
500 INTRAVENOUS SOLUTION INTRAVENOUS ONCE
Status: COMPLETED | OUTPATIENT
Start: 2022-04-28 | End: 2022-04-28

## 2022-04-28 RX ORDER — ALBUTEROL SULFATE 90 UG/1
2 AEROSOL, METERED RESPIRATORY (INHALATION) EVERY 4 HOURS PRN
Status: DISCONTINUED | OUTPATIENT
Start: 2022-04-28 | End: 2022-05-02 | Stop reason: HOSPADM

## 2022-04-28 RX ORDER — SODIUM CHLORIDE 0.9 % (FLUSH) 0.9 %
5-40 SYRINGE (ML) INJECTION EVERY 12 HOURS SCHEDULED
Status: DISCONTINUED | OUTPATIENT
Start: 2022-04-28 | End: 2022-05-02 | Stop reason: HOSPADM

## 2022-04-28 RX ORDER — HYDROCODONE BITARTRATE AND ACETAMINOPHEN 10; 325 MG/1; MG/1
1 TABLET ORAL EVERY 6 HOURS PRN
Status: DISCONTINUED | OUTPATIENT
Start: 2022-04-28 | End: 2022-05-02 | Stop reason: HOSPADM

## 2022-04-28 RX ORDER — IPRATROPIUM BROMIDE AND ALBUTEROL SULFATE 2.5; .5 MG/3ML; MG/3ML
3 SOLUTION RESPIRATORY (INHALATION) 4 TIMES DAILY
Status: DISCONTINUED | OUTPATIENT
Start: 2022-04-29 | End: 2022-05-02 | Stop reason: HOSPADM

## 2022-04-28 RX ORDER — IPRATROPIUM BROMIDE AND ALBUTEROL SULFATE 2.5; .5 MG/3ML; MG/3ML
SOLUTION RESPIRATORY (INHALATION)
Status: DISPENSED
Start: 2022-04-28 | End: 2022-04-29

## 2022-04-28 RX ORDER — GUAIFENESIN/DEXTROMETHORPHAN 100-10MG/5
5 SYRUP ORAL
Status: DISCONTINUED | OUTPATIENT
Start: 2022-04-28 | End: 2022-05-02 | Stop reason: HOSPADM

## 2022-04-28 RX ORDER — ACETAMINOPHEN 650 MG/1
650 SUPPOSITORY RECTAL EVERY 6 HOURS PRN
Status: DISCONTINUED | OUTPATIENT
Start: 2022-04-28 | End: 2022-05-02 | Stop reason: HOSPADM

## 2022-04-28 RX ORDER — ACETAMINOPHEN 325 MG/1
650 TABLET ORAL ONCE
Status: COMPLETED | OUTPATIENT
Start: 2022-04-28 | End: 2022-04-28

## 2022-04-28 RX ORDER — SODIUM CHLORIDE 9 MG/ML
25 INJECTION, SOLUTION INTRAVENOUS PRN
Status: DISCONTINUED | OUTPATIENT
Start: 2022-04-28 | End: 2022-05-02 | Stop reason: HOSPADM

## 2022-04-28 RX ORDER — HYDROXYCHLOROQUINE SULFATE 200 MG/1
300 TABLET, FILM COATED ORAL DAILY
Status: DISCONTINUED | OUTPATIENT
Start: 2022-04-29 | End: 2022-05-02 | Stop reason: HOSPADM

## 2022-04-28 RX ORDER — PREGABALIN 75 MG/1
300 CAPSULE ORAL 2 TIMES DAILY
Status: DISCONTINUED | OUTPATIENT
Start: 2022-04-28 | End: 2022-05-02 | Stop reason: HOSPADM

## 2022-04-28 RX ORDER — POTASSIUM CHLORIDE 20 MEQ/1
20 TABLET, EXTENDED RELEASE ORAL 2 TIMES DAILY
Status: DISCONTINUED | OUTPATIENT
Start: 2022-04-28 | End: 2022-05-02 | Stop reason: HOSPADM

## 2022-04-28 RX ORDER — LEVOTHYROXINE SODIUM 0.15 MG/1
150 TABLET ORAL DAILY
Status: DISCONTINUED | OUTPATIENT
Start: 2022-04-29 | End: 2022-05-02 | Stop reason: HOSPADM

## 2022-04-28 RX ORDER — SODIUM CHLORIDE 0.9 % (FLUSH) 0.9 %
5-40 SYRINGE (ML) INJECTION PRN
Status: DISCONTINUED | OUTPATIENT
Start: 2022-04-28 | End: 2022-05-02 | Stop reason: HOSPADM

## 2022-04-28 RX ORDER — HYDROXYCHLOROQUINE SULFATE 200 MG/1
300 TABLET, FILM COATED ORAL DAILY
Status: ON HOLD | COMMUNITY
End: 2022-08-29

## 2022-04-28 RX ORDER — ALENDRONATE SODIUM 70 MG/1
70 TABLET ORAL
Status: DISCONTINUED | OUTPATIENT
Start: 2022-04-28 | End: 2022-04-28

## 2022-04-28 RX ORDER — TRAZODONE HYDROCHLORIDE 50 MG/1
200 TABLET ORAL NIGHTLY
Status: DISCONTINUED | OUTPATIENT
Start: 2022-04-28 | End: 2022-05-02 | Stop reason: HOSPADM

## 2022-04-28 RX ADMIN — GUAIFENESIN AND DEXTROMETHORPHAN 5 ML: 100; 10 SYRUP ORAL at 19:11

## 2022-04-28 RX ADMIN — SODIUM CHLORIDE, PRESERVATIVE FREE 5 ML: 5 INJECTION INTRAVENOUS at 21:00

## 2022-04-28 RX ADMIN — HYDROCODONE BITARTRATE AND ACETAMINOPHEN 1 TABLET: 10; 325 TABLET ORAL at 19:10

## 2022-04-28 RX ADMIN — SODIUM CHLORIDE 500 ML: 9 INJECTION, SOLUTION INTRAVENOUS at 11:48

## 2022-04-28 RX ADMIN — IPRATROPIUM BROMIDE AND ALBUTEROL SULFATE 3 ML: 2.5; .5 SOLUTION RESPIRATORY (INHALATION) at 19:56

## 2022-04-28 RX ADMIN — ACETAMINOPHEN 650 MG: 325 TABLET ORAL at 11:43

## 2022-04-28 RX ADMIN — POTASSIUM CHLORIDE 20 MEQ: 1500 TABLET, EXTENDED RELEASE ORAL at 22:24

## 2022-04-28 RX ADMIN — CEFTRIAXONE 1000 MG: 1 INJECTION, POWDER, FOR SOLUTION INTRAMUSCULAR; INTRAVENOUS at 11:53

## 2022-04-28 RX ADMIN — GUAIFENESIN AND DEXTROMETHORPHAN 5 ML: 100; 10 SYRUP ORAL at 15:14

## 2022-04-28 RX ADMIN — GUAIFENESIN AND DEXTROMETHORPHAN 5 ML: 100; 10 SYRUP ORAL at 22:23

## 2022-04-28 RX ADMIN — AZITHROMYCIN DIHYDRATE 500 MG: 500 INJECTION, POWDER, LYOPHILIZED, FOR SOLUTION INTRAVENOUS at 12:29

## 2022-04-28 RX ADMIN — TRAZODONE HYDROCHLORIDE 200 MG: 50 TABLET ORAL at 22:24

## 2022-04-28 RX ADMIN — PREGABALIN 300 MG: 75 CAPSULE ORAL at 22:23

## 2022-04-28 RX ADMIN — IPRATROPIUM BROMIDE AND ALBUTEROL SULFATE 3 ML: 2.5; .5 SOLUTION RESPIRATORY (INHALATION) at 16:20

## 2022-04-28 ASSESSMENT — PAIN DESCRIPTION - LOCATION
LOCATION: BACK
LOCATION: BACK

## 2022-04-28 ASSESSMENT — ENCOUNTER SYMPTOMS
SHORTNESS OF BREATH: 1
NAUSEA: 1
ABDOMINAL PAIN: 0
COUGH: 1

## 2022-04-28 ASSESSMENT — PAIN DESCRIPTION - DESCRIPTORS
DESCRIPTORS: DISCOMFORT
DESCRIPTORS: DISCOMFORT

## 2022-04-28 ASSESSMENT — PAIN DESCRIPTION - ORIENTATION
ORIENTATION: LOWER
ORIENTATION: LOWER

## 2022-04-28 ASSESSMENT — PAIN SCALES - GENERAL
PAINLEVEL_OUTOF10: 7
PAINLEVEL_OUTOF10: 7

## 2022-04-28 NOTE — PROGRESS NOTES
Occupational Therapy  Facility/Department: [facility] THE [facility] MED SURG  Occupational Therapy Initial Assessment    Name: Vikram Pena  : 1945  MRN: [id number]  Date of Service: 2022    Discharge Recommendations:  Continue to assess pending progress,Home with Home health OT     Patient Diagnosis(es): The encounter diagnosis was Multifocal pneumonia. Past Medical History:  has a past medical history of Chronic pain syndrome, COPD (chronic obstructive pulmonary disease) ([address] Depression, GERD (gastroesophageal reflux disease), Hypothyroidism, and Osteoporosis. Past Surgical History:  has a past surgical history that includes Hysterectomy; back surgery; Breast surgery; Carpal tunnel release; joint replacement; joint replacement; fracture surgery (Left, 2018); and Wrist fracture surgery (Left, 2018). Treatment Diagnosis: M62.81    Assessment   Performance deficits / Impairments: Decreased functional mobility ; Decreased ADL status; Decreased strength;Decreased safe awareness;Decreased fine motor control;Decreased high-level IADLs;Decreased balance;Decreased endurance;Decreased coordination  Assessment: Pt is 78 y/o female with dx of pneumonia presenting with decreased strength, balance, endurance and safety awareness impacting pt's ability to safely complete ADLs at PLOF. Pt would benefit from skilled OT intervention to address functional deficits to safely return to PLOF.   Treatment Diagnosis: M62.81  Prognosis: Fair  Decision Making: Medium Complexity  Activity Tolerance  Activity Tolerance: Patient limited by fatigue;Patient limited by pain        Plan   Plan  Times per Week: 6-7x/week  Times per Day: Daily  Plan Weeks: length of hospitalization  Current Treatment Recommendations: Strengthening,Balance training,Functional mobility training,Endurance training,Patient/Caregiver education & training,Safety education & training,Self-Care / ADL     Subjective   General  Patient assessed for rehabilitation services?: Yes  Family / Caregiver Present: Yes  Referring Practitioner: Dr. Maciej Griffith  Diagnosis: Pneumonia  Subjective  Subjective: Pt reports pain in lower back and LLE. 7-8/10, recent onset. General Comment  Comments: Pt in bed upon OTR arrival, agreeable to OT eval at this time. Pt lethargic and requires frequent cueing to arouse. Social/Functional History  Social/Functional History  Lives With: Spouse  Type of Home: House  Home Layout: Two level,Able to Live on Main level with bedroom/bathroom  Home Access: Stairs to enter with rails  Entrance Stairs - Number of Steps: 1 step in garage, 3 steps outside  Entrance Stairs - Rails: Right  Bathroom Shower/Tub: Tub/Shower unit  Bathroom Toilet: Handicap height  Bathroom Equipment: Grab bars around toilet,Grab bars in [facility] chair  Home Equipment: Cane,Walker, rolling  Has the patient had two or more falls in the past year or any fall with injury in the past year?: No  Receives Help From: Family  ADL Assistance: Independent  Homemaking Assistance: Independent  Homemaking Responsibilities: Yes  Transfer Assistance: Independent  Active : Yes       Objective   Pulse: 81  Heart Rate Source: Monitor; Apical  BP: 131/68  BP Location: Right upper arm  Patient Position: Semi fowlers  MAP (Calculated): 89  Resp: 20  SpO2: 99 %  O2 Device: Nasal cannula          Toilet Transfers  Toilet - Technique: Ambulating  Equipment Used: Grab bars  Toilet Transfer: Minimal assistance  Strength: Generally decreased, functional (BUE MMS tested via MMT 3+/5 grossly.)  Coordination: Generally decreased, functional  ADL  Feeding: Modified independent ;Setup  Grooming: Minimal assistance  UE Bathing: Minimal assistance  LE Bathing: Moderate assistance  UE Dressing: Minimal assistance  LE Dressing: Moderate assistance  Toileting: Minimal assistance  Additional Comments: Pt completed LB dressing while seated EOB and on toilet.  Pt able to doff/don R sock, but struggled with L due to increased LLE pain. Pt required mod A for LB dressing while seated on toilet to change pants and underwear, pt able to manage LB clothing over hips upon standing with min A for balance. Bed mobility  Supine to Sit: Contact guard assistance  Sit to Supine: Contact guard assistance  Transfers  Sit to stand: Minimal assistance  Stand to sit: Minimal assistance  Transfer Comments: Pt demo unsteady balance upon standing with posterior lean. LUE AROM (degrees)  LUE AROM : WFL  Left Hand AROM (degrees)  Left Hand AROM: WFL  RUE AROM (degrees)  RUE AROM : WFL  Right Hand AROM (degrees)  Right Hand AROM: [facility]       AM-PAC Score        AM-PAC Inpatient Daily Activity Raw Score: 17 (04/28/22 1605)  AM-PAC Inpatient ADL T-Scale Score : 37.26 (04/28/22 1605)  ADL Inpatient CMS 0-100% Score: 50.11 (04/28/22 1605)  ADL Inpatient CMS G-Code Modifier : CK (04/28/22 1605)    Goals  Short Term Goals  Time Frame for Short term goals: 20 visits  Short Term Goal 1: Pt and caregiver will verbalize understanding of d/c folder for improved safety for d/c to home. Short Term Goal 2: Pt will tolerate BUE and FM HEP to increase MMS to 4/5 for increased ease with ADLs. Short Term Goal 3: Pt will demo UB/LB ADLs in seated/standing with no LOB and SBA using AE PRN. Short Term Goal 4: Pt will demo dynamic standing act x7 mins without LOB and CGA.   Patient Goals   Patient goals : to return home       Therapy Time   Individual Concurrent Group Co-treatment   Time In 1530         Time Out 1545         Minutes [address], OTR/L

## 2022-04-28 NOTE — RT PROTOCOL NOTE
RESPIRATORY ASSESSMENT PROTOCOL                                                                                              Patient Name: Cleopatra Whitfield Room#: [id number] : 1945     Admitting diagnosis: Community acquired bacterial pneumonia [J15.9]  Multifocal pneumonia [J18.9]       Medical History:   Past Medical History:   Diagnosis Date    Chronic pain syndrome     dilaudid infusion pump    COPD (chronic obstructive pulmonary disease) ([address]     Depression     GERD (gastroesophageal reflux disease)     Hypothyroidism     Osteoporosis        PATIENT ASSESSMENT    LABORATORY DATA  Hematology:   Lab Results   Component Value Date    WBC 14.7 2022    RBC 4.42 2022    HGB 13.4 2022    HCT 42.4 2022     2022     Chemistry:    Lab Results   Component Value Date    PHART 7.398 2022    TMJ0JQD 54.3 2022    PO2ART 75.9 2022    U5YCAAHR 95.0 2022    FIL7SEM 32.7 2022    PBEA 6.2 2022       VITALS  Pulse: 81   Resp: 20  BP: 131/68  SpO2: 99 % O2 Device: Nasal cannula  Temp: 98.3 °F (36.8 °C)    SKIN COLOR  [x] Normal  [] Pale  [] Dusky  [] Cyanotic    RESPIRATORY PATTERN  [] Normal  [x] Dyspnea  [] Cheyne-Granger  [] Kussmaul  [] Biots    AMBULATORY  [] Yes  [] No  [x] With Assistance      Patient Acuity 0 1 2 3 4 Score   Level of Concious (LOC) [x]  Alert & Oriented or Pt normal LOC []  Confused;follows directions []  Confused & uncooper-ative []  Obtunded []  Comatose 0   Respiratory Rate  (RR) []  Reg. rate & pattern. 12 - 20 bpm  []  Increased RR. Greater than 20 bpm   [x]  SOB w/ exertion or RR greater than 24 bpm []  Access- ory muscle use at rest. Abn.  resp. []  SOB at rest.   2   Bilateral Breath Sounds (BBS) []  Clear []  Diminish-ed bases  []  Diminish-ed t/o, or rales   [x]  Sporadic, scattered wheezes or rhonchi []  Persistentwheezes and, or absent BBS 3   Cough []  Strong, effective, & non-prod. [x]  Effective & prod. Less than 25 ml (2 TBSP) over past 24 hrs []  Ineffective & non-prod to less than 25 ML over past 24 hrs []  Ineffective and, or greater than 25 ml sputum prod. past 24 hrs. []  Nonspon- taneous; Requires suctioning 1   Pulmonary History  (PULM HX) []  No smoking and no chronic pulmonaryhistory []  Former smoker. Quit over 12 mos. ago []  Current smoker or quit w/ in 12 mos []  Pulm. History and, or 20 pk/yr smoking hx [x]  Admitted w/ acute pulm. dx and, or has been admitted w/ pulm. dx 2 or more times over past 12 mos 4   Surgical History this Admit  (SURG HX) [x]  No surgery []  General surgery []  Lower abdominal []  Thoracic or upper abdominal   []  Thoracic w/ pulm. disease 0   Chest X-Ray (CXR)/CT Scan []  Clear or not applicable []  Not available []  Atelect- asis or pleural effusions []  Localized infiltrate or pulm. edema [x]  Con-solidated Infiltrates, bilateral, or in more than 1 lobe 4   Slow or Forced VC, FEV1 OR PEFR (PULM FXN)  [x]  80% or greater, or not indicated []  Pt. unable to perform []  FEV1 or PEFR or VC 51-79%. []  FEV1 or PEFR or VC  30-49%   []  FEV1 or PEFR or VC less than 30%   0   TOTAL ACUITY: 14       CARE PLAN    If Acuity Level is 2, 3, or 4 in any of the following:    [] BILATERAL BREATH SOUNDS (BBS)     [] PULMONARY HISTORY (PULM HX)  [] PULMONARY FUNCTION (PULM FX)    Goal: Improve respiratory functions in patients with airway disease and decrease WOB    [x] AEROSOL PROTOCOL    Total Acuity:   16-32  []  Secondary Assessment in 24 hrs Total Acuity:  9-15  [x]  Secondary Assessment in 24 hrs Total Acuity:  4-8  []  Secondary Assessment in 48 hrs Total Acuity:  0-3  []  Secondary Assessment in 72 hrs   HHN AEROSOL THERAPY with  [physician-ordered bronchodilator(s)] q 4 & Albuterol PRN q2 hrs. Breath-Actuated Neb if BBS Acuity = 4, and pt. can use MP. Notify physician if condition deteriorates.   HHN AEROSOL THERAPY with  [physician-ordered bronchodilator(s)]  QID and Albuterol PRN q4 hrs. Breath-Actuated Neb if BBS Acuity = 4, and pt. can use MP. Notify physician if condition deteriorates. MDI THERAPY with  2 actuations of [physician-ordered bronchodilator(s)] via spacer TID Albuterol and PRNq4 hrs. If unable to utilize MDI: HHN [physician-ordered bronchodilator(s)] TID and Albuterol PRN q4 hrs. Notify physician if condition deteriorates. MDI THERAPY with  [physician-ordered bronchodilator(s)] via spacer TID PRN. If unable to utilize MDI: HHN [physician-ordered bronchodilator(s)] TID PRN. Notify physician if condition deteriorates. If Acuity Level is 2, 3, or 4 in any of the following:    [] COUGH     [] SURGICAL HISTORY (SURG HX)  [] CHEST XRAY (CXR)    Goal: Improvement in sputum mobilization in patients with ineffective airway clearance. Reverse atelectasis. [x] Bronchopulmonary Hygiene Protocol    Total Acuity:   16-32  []  Secondary Assessment in 24 hrs Total Acuity:  9-15  [x]  Secondary Assessment in 24 hrs Total Acuity:  4-8  []  Secondary Assessment in 48 hrs Total Acuity:  0-3  []  Secondary Assessment in 72 hrs   METANEB QID with [physician-ordered bronchodilator(s)] if CXR Acuity = 4; otherwise:  PD&P, PEP, or Vest QID & PRN  NT Sxn PRN for ineffective cough  METANEB QID with [physician-ordered bronchodilator(s)] if CXR Acuity = 4; otherwise:  PD&P, PEP, or Vest TID & PRN  NT Sxn PRN for ineffective cough  Instruct patient to self-perform IS q1hr WA   Directed Cough self-performed q1hr WA     If Acuity Level is 2 or above in the following:    [] PULMONARY HISTORY (PULM HX)    Goal: Assist patient in quitting smoking to slow or stop the progression of lung disease.     [] Smoking Cessation Protocol    SMOKING CESSATION EDUCATION provided according to policy VG_327: (marlyn with an X)  ____Yes    ____ No     ____ NA    Smoking Cessation Booklet given:  ____Yes  ____No ____Patient Teresa Tolentino

## 2022-04-28 NOTE — PROGRESS NOTES
Oral bisphosphonates are used for the prevention and treatment of osteoporosis and for the treatment of Pagets disease. Oral bisphosphonate absorption is greatly affected by other medications, foods, and beverages. Therefore, the medication must be administered first thing in the morning at least 30 minutes before (60 minutes for ibandronate) the first food, beverage, or other medication. Additionally, patients must stay in an upright position (not to lie down) for at least 30 minutes after taking the oral bisphosphonate (60 minutes for ibandronate) due to risk of irritation to upper gastrointestinal mucosa. Esophagitis, dysphagia, esophageal ulcers, esophageal erosions, and esophageal stricture have been reported with oral bisphosphonates. This risk increases in patients unable to comply with dosing instructions. Due to the constraining dosing procedures and risk of serious GI events, oral bisphosphonates should be held during a patients hospitalization. In this instance the risk of serious adverse events outweighs the benefits of these medications. This medication will be discontinued at this time and may be reordered upon discharge.      Thank you,  Diamond Thomas, R.Smitha., 4/28/2022,2:13 PM

## 2022-04-28 NOTE — PROGRESS NOTES
Patient arrived to floor at this time via hospital bed. Patient able to ambulate to bed in room. patient alert and oriented. Vitals and assessment completed. Patient on 3L O2 and normally wears 2L at home. Lungs wheezing and rhonchi. Admission navigator completed. Patient does have pain pump that she stated is dilaudid mixed with another medication. Acapella provided to patient and instructions provided. Patient denies having needs. Call light within reach. Will continue to monitor.

## 2022-04-28 NOTE — ED PROVIDER NOTES
[facility] [place] ED  EMERGENCY DEPARTMENT ENCOUNTER      Pt Name: Madhu Abdi  MRN: [id number]  [place] 1945  Date of evaluation: 4/28/2022  Provider: Ester Ferrera MD    [address] [place] [address]       Chief Complaint   Patient presents with    Shortness of Breath    Fever         HISTORY OF PRESENT ILLNESS   (Location/Symptom, Timing/Onset, Context/Setting, Quality, Duration, Modifying Factors, Severity)  Note limiting factors. Madhu Abdi is a 77 y.o. female with a history of COPD, chronically on supplemental oxygen via nasal cannula at 2 L/min, that presents to the ED by EMS today for evaluation of shortness of breath. States that she has had worsening shortness of breath over the course the last few days. History of pneumonia \"4 times this year\" and notes that the symptoms today feel similar to this. Endorses a productive cough which worsened last night. Additionally, notes some chest pain only when coughing. Has had some nausea but no vomiting. No abdominal pain. No other complaints at this time. Noted to be febrile by EMS. Patient states that the  told her she had a \"small fever\" last night. Nursing Notes were reviewed. REVIEW OF SYSTEMS    (2-9 systems for level 4, 10 or more for level 5)     Review of Systems   Constitutional: Positive for fever. HENT: Negative. Respiratory: Positive for cough and shortness of breath. Gastrointestinal: Positive for nausea. Negative for abdominal pain. Genitourinary: Negative. Except as noted above the remainder of the review of systems was reviewed and negative.        PAST MEDICAL HISTORY     Past Medical History:   Diagnosis Date    Chronic pain syndrome     dilaudid infusion pump    COPD (chronic obstructive pulmonary disease) (HCC)     Depression     GERD (gastroesophageal reflux disease)     Hypothyroidism     Osteoporosis          SURGICAL HISTORY       Past Surgical History:   Procedure Laterality Date  BACK SURGERY      BREAST SURGERY      CARPAL TUNNEL RELEASE      FRACTURE SURGERY Left 12/20/2018    ORIF wrist    HYSTERECTOMY      JOINT REPLACEMENT      right knee    JOINT REPLACEMENT      WRIST FRACTURE SURGERY Left 12/20/2018    WRIST OPEN REDUCTION INTERNAL FIXATION-DISTAL RADIUS performed by Justin Pettit MD at [address]       Previous Medications    ALBUTEROL SULFATE  (90 BASE) MCG/ACT INHALER    Inhale 2 puffs into the lungs 4 times daily    ALENDRONATE (FOSAMAX) 70 MG TABLET    Take 70 mg by mouth every 7 days On Sundays    ASCORBIC ACID (VITAMIN C) 1000 MG TABLET    Take 1 tablet by mouth 2 times daily for 7 days    ASPIRIN 81 MG TABLET    Take 81 mg by mouth daily    CALCIUM CITRATE-VITAMIN D (CITRICAL + D) 315-250 MG-UNIT TABS PER TABLET    Take 1 tablet by mouth 2 times daily (with meals)    DEXTROSE 5 % SOLN WITH HYDROMORPHONE HCL PF 10 MG/ML SOLN 1 MG/ML    Infuse intravenously continuous Pt has continuous dilaudid infusion pump implanted, but is unsure of dosage. 2.797 mg/day    DULOXETINE (CYMBALTA) 60 MG EXTENDED RELEASE CAPSULE    Take 60 mg by mouth daily    FUROSEMIDE (LASIX) 20 MG TABLET    Take 1 tablet by mouth daily    GUAIFENESIN-DEXTROMETHORPHAN (ROBITUSSIN DM) 100-10 MG/5ML SYRUP    Take 5 mLs by mouth every 4 hours (while awake)    HYDROCODONE-ACETAMINOPHEN (NORCO)  MG PER TABLET    Take 1 tablet by mouth every 6 hours as needed for Pain.     HYDROXYCHLOROQUINE (PLAQUENIL) 200 MG TABLET    Take 300 mg by mouth daily    IPRATROPIUM-ALBUTEROL (DUONEB) 0.5-2.5 (3) MG/3ML SOLN NEBULIZER SOLUTION    3 mLs    LEVOTHYROXINE (SYNTHROID) 150 MCG TABLET    Take 150 mcg by mouth Daily     PANTOPRAZOLE SODIUM (PROTONIX) 40 MG PACK PACKET    Take 40 mg by mouth 2 times daily (before meals)    POTASSIUM CHLORIDE (KLOR-CON) 20 MEQ PACKET    Take 20 mEq by mouth 2 times daily    PREGABALIN (LYRICA) 300 MG CAPSULE    Take 1 capsule by mouth 2 times daily for 30 days. TRAZODONE (DESYREL) 100 MG TABLET    Take 200 mg by mouth nightly     UMECLIDINIUM-VILANTEROL (ANORO ELLIPTA) 62.5-25 MCG/INH AEPB INHALER    Inhale 1 puff into the lungs daily    VITAMIN D (CHOLECALCIFEROL) 50 MCG ( UT) TABS TABLET    Take 1 tablet by mouth daily for 7 days    XARELTO 20 MG TABS TABLET    TAKE 1 TABLET BY MOUTH  DAILY WITH BREAKFAST       ALLERGIES     Patient has no known allergies. FAMILY HISTORY       Family History   Problem Relation Age of Onset    Heart Attack Father 61    Heart Attack Sister     Heart Disease Brother           SOCIAL HISTORY       Social History     Socioeconomic History    Marital status:      Spouse name: None    Number of children: None    Years of education: None    Highest education level: None   Occupational History    None   Tobacco Use    Smoking status: Former Smoker     Packs/day: 1.00     Years: 10.00     Pack years: 10.00     Quit date: 1976     Years since quittin.4    Smokeless tobacco: Never Used   Vaping Use    Vaping Use: Never used   Substance and Sexual Activity    Alcohol use: No    Drug use: No    Sexual activity: None   Other Topics Concern    None   Social History Narrative    None     Social Determinants of Health     Financial Resource Strain:     Difficulty of Paying Living Expenses: Not on file   Food Insecurity:     Worried About Running Out of Food in the Last Year: Not on file    Concepcion of Food in the Last Year: Not on file   Transportation Needs:     Lack of Transportation (Medical): Not on file    Lack of Transportation (Non-Medical):  Not on file   Physical Activity:     Days of Exercise per Week: Not on file    Minutes of Exercise per Session: Not on file   Stress:     Feeling of Stress : Not on file   Social Connections:     Frequency of Communication with Friends and Family: Not on file    Frequency of Social Gatherings with Friends and Family: Not on file    Attends Gnosticist Services: Not on file    Active Member of Clubs or Organizations: Not on file    Attends Club or Organization Meetings: Not on file    Marital Status: Not on file   Intimate Partner Violence:     Fear of Current or Ex-Partner: Not on file    Emotionally Abused: Not on file    Physically Abused: Not on file    Sexually Abused: Not on file   Housing Stability:     Unable to Pay for Housing in the Last Year: Not on file    Number of [place] in the Last Year: Not on file    Unstable Housing in the Last Year: Not on file         PHYSICAL EXAM    (up to 7 for level 4, 8 or more for level 5)     Physical Exam  Vitals reviewed. Constitutional:       General: She is not in acute distress. Appearance: She is not toxic-appearing or diaphoretic. HENT:      Head: Normocephalic and atraumatic. Mouth/Throat:      Mouth: Mucous membranes are moist.      Pharynx: Oropharynx is clear. Eyes:      Comments: Anicteric. No discharge. Neck:      Vascular: No JVD. Cardiovascular:      Rate and Rhythm: Normal rate and regular rhythm. Heart sounds: No murmur heard. Pulmonary:      Comments: Scattered wheezes and rhonchi bilaterally. No rales. No increased work of breathing. No stridor. Abdominal:      Palpations: Abdomen is soft. There is no hepatomegaly, splenomegaly or mass. Tenderness: There is abdominal tenderness (mild, left upper - non-peritoneal). There is no guarding or rebound. Musculoskeletal:      Cervical back: Neck supple. Right lower leg: No tenderness. No edema. Left lower leg: No tenderness. No edema. Skin:     General: Skin is warm and dry. Coloration: Skin is not cyanotic or pale. Neurological:      General: No focal deficit present. Mental Status: She is alert.          DIAGNOSTIC RESULTS     EKG: All EKG's are interpreted by the Emergency Department Physician who either signs or Co-signs this chart in the absence of a cardiologist.    Sinus tachycardia 102 bpm.  No STEMI. QTc 465. Left axis deviation. RADIOLOGY:   Interpretation per the Radiologist below, if available at the time of this note:    XR CHEST PORTABLE   Final Result   Unchanged extensive bilateral interstitial opacities could represent scarring   or fibrosis the patient's known COVID pneumonia               LABS:  Labs Reviewed   CBC WITH AUTO DIFFERENTIAL - Abnormal; Notable for the following components:       Result Value    WBC 14.7 (*)     Seg Neutrophils 87 (*)     Lymphocytes 4 (*)     Immature Granulocytes 1 (*)     Segs Absolute 12.90 (*)     Absolute Lymph # 0.55 (*)     All other components within normal limits   COMPREHENSIVE METABOLIC PANEL W/ REFLEX TO MG FOR LOW K - Abnormal; Notable for the following components:    Glucose 112 (*)     BUN 7 (*)     Anion Gap 6 (*)     All other components within normal limits   BLOOD GAS, VENOUS - Abnormal; Notable for the following components:    pCO2, Micheal 55.7 (*)     HCO3, Venous 32.1 (*)     Positive Base Excess, Micheal 5.3 (*)     All other components within normal limits   RAPID INFLUENZA A/B ANTIGENS   COVID-19, RAPID   CULTURE, URINE   CULTURE, BLOOD 1   CULTURE, BLOOD 1   LACTATE, SEPSIS   URINALYSIS WITH MICROSCOPIC   LACTATE, SEPSIS       All other labs were within normal range or not returned as of this dictation. EMERGENCY DEPARTMENT COURSE and DIFFERENTIAL DIAGNOSIS/MDM:   Vitals:    Vitals:    04/28/22 1040   BP: (!) 100/47   Pulse: 114   Resp: 23   Temp: 100.8 °F (38.2 °C)   TempSrc: Tympanic   SpO2: (!) 88%   Weight: 150 lb (68 kg)   Height: 5' 5\" (1.651 m)     Patient presented to the ED for evaluation of shortness of breath. Noted to be febrile here to 100.8 °F.  Required increase in oxygenation from her baseline of 2 L nasal cannula to 4 L nasal cannula to maintain an oxygen saturation above 90%. No distress, though patient does appear somewhat ill.   Improved with improved oxygenation. Physical examination demonstrates bilateral rhonchi and wheezing. Otherwise, benign. Chest x-ray, per the radiologist interpretation, suggest no evident acute pneumonia and a generally unchanged appearance from prior. However, I feel that there is both a new right lower lobe infiltrate and a left midlung infiltrate compared to prior and pneumonia is clinically consistent with the patient's presentation today. Started on Rocephin and azithromycin. Admitted to the hospitalist service under Dr. Laurie Kuhn. Doubt PE. Doubt ACS. Influenza and COVID-19 testing negative. Critical care time: 35 minutes (hypoxia, hypotension)    FINAL IMPRESSION      1.  Multifocal pneumonia          DISPOSITION/PLAN   DISPOSITION  - Admit        (Please note that portions of this note were completed with a voice recognition program.  Efforts were made to edit the dictations but occasionally words are mis-transcribed.)    Jhonny Blanca MD (electronically signed)  Attending Emergency Physician              Jhonny Blanca MD  04/28/22 [address], [place]  04/28/22 [id number]

## 2022-04-28 NOTE — PROGRESS NOTES
[facility]    Facility/Department: [address] [place] [address] SURG    Speech Language Pathology    Clinical Bedside Swallow Evaluation    NAME:Toshia Malone    : 1945 (77 y.o.)    MRN: [id number]    ROOM: [id number]    ADMISSION DATE: 2022    PATIENT DIAGNOSIS(ES): Community acquired bacterial pneumonia [J15.9]  Multifocal pneumonia [J18.9]    Chief Complaint   Patient presents with    Shortness of Breath    Fever       Patient Active Problem List    Diagnosis Date Noted    Community acquired bacterial pneumonia 2022    Atypical pneumonia 2022    New onset a-fib ([facility] 2022    Community acquired pneumonia 2022    Anemia 10/14/2021    Biventricular congestive heart failure ([facility]     Hypothyroidism 10/05/2018    Major depressive disorder 10/05/2018    Chronic midline low back pain without sciatica 10/05/2018    Iron deficiency anemia 10/05/2018       Past Medical History:   Diagnosis Date    Chronic pain syndrome     dilaudid infusion pump    COPD (chronic obstructive pulmonary disease) (HCC)     Depression     GERD (gastroesophageal reflux disease)     Hypothyroidism     Osteoporosis        Past Surgical History:   Procedure Laterality Date    BACK SURGERY      BREAST SURGERY      CARPAL TUNNEL RELEASE      FRACTURE SURGERY Left 2018    ORIF wrist    HYSTERECTOMY      JOINT REPLACEMENT      right knee    JOINT REPLACEMENT      WRIST FRACTURE SURGERY Left 2018    WRIST OPEN REDUCTION INTERNAL FIXATION-DISTAL RADIUS performed by Parvin Gilman MD at [address]       No Known Allergies    DATE st    Date of Evaluation: 2022    Evaluating Therapist: HILLARY Mendosa    Dysphagia Diagnosis: Swallow function appears [facility]    Recommended Diet    Recommendations: Modified barium swallow study (as previously ordered by care team)    Diet Solids Recommendation: Regular    Liquid Consistency Recommendation:  Thin    Recommended Form of Meds: PO (patient prefers in applesauce)    Compensatory Swallowing Strategies : Alternate solids and liquids;Upright as possible for all oral intake;Remain upright for 30-45 minutes after meals;Small bites/sips    Reason for Referral    Laura Miller was referred for a bedside swallow evaluation to assess the efficiency of her swallow function, identify signs and symptoms of aspiration, identify risk factors, and make recommendations regarding safe dietary consistencies, effective compensatory strategies, and safe eating environment. General    Chart Reviewed: Yes  Behavior/Cognition: Alert; Cooperative  Respiratory Status: Room air  Communication Observation: Functional  Follows Directions: Simple  Dentition: Dentures top;Dentures bottom  Patient Positioning: Upright in bed  Baseline Vocal Quality: Normal  Volitional Cough: Weak  Prior Dysphagia History: no prior Dysphagia hx, at least yearly BSE completed in hospital without diet changes however, pt has recurrent multifocal pneumonia and pulmonologist had ordered MBS ot be completed on 4/29 before in pt stay  Consistencies Administered: Regular;Pureed; Thin - straw    Vision and Hearing    Vision  Vision: Impaired  Vision Exceptions: Wears glasses at all times  Hearing  Hearing: Within functional limits    Current Diet level    Current Diet : Regular    Oral Motor    Labial: No impairment  Dentition: Upper dentures; Lower dentures  Lingual: No impairment  Mandible: No impairment    Oral/Pharyngeal Phase    Oral Phase - Comment: pt demostrated a WFL oral phase for all trials. There was no lingual stasis, functional mastication and bilabial seal for trials presented at this time. pt and her  report no concerns with this phase of swallow at home  Pharyngeal Phase: pharyngeal phase appears WFL at this time. There are no overt s/s aspiration.  No vocal change, no runny eyes/nose, no SOB observed, no change in cough that is present before PO intake and no complaints by patient. PO Trials  Consistency Presented: Regular;Thin;Pureed  How Presented: Self-fed/presented  Bolus Acceptance: No impairment  Bolus Formation/Control: No impairment  Propulsion: No impairment  Oral Residue: None  Initiation of Swallow: No impairment  Laryngeal Elevation: Functional  Aspiration Signs/Symptoms: None  Pharyngeal Phase Characteristics: No impairment, issues, or problems  Effective Modifications: Alternate liquids/solids; Double swallow;Small sips and bites (all used independently)  Cues for Modifications: None    Dysphagia Diagnosis    Dysphagia Diagnosis: Swallow function appears [facility]    Dysphagia Outcome Severity Scale: Level 6: Within functional limits/Modified independence    Recommendations    Requires SLP Intervention: No  Recommendations: Modified barium swallow study (as previously ordered by care team)  Diet Solids Recommendation: Regular  Liquid Consistency Recommendation: Thin  Compensatory Swallowing Strategies : Alternate solids and liquids;Upright as possible for all oral intake;Remain upright for 30-45 minutes after meals;Small bites/sips  Recommended Form of Meds: PO (patient prefers in applesauce)  Therapeutic Interventions: Patient/Family education  Duration of Treatment: no therapy warranted at this time      Education    extensive education completed with pt and  re: s/s aspiration to monitor for at home. Discussed overt s/s aspiration as well as silent symptoms. Discussed importance of MBS secondary to frequent pneumonia    Individuals consulted  Consulted and agree with results and recommendations: Patient;RN;Family member  Family member consulted:   RN Name: Emerald Taylor    Safety Devices  Safety Devices in place: Yes  Type of devices: Left in bed;Nurse notified; Other (comment) (patients  is on couch in the room)    Pain Assessment    Pain Assessment: Patient does not c/o pain (no pain while seated in bed for BSE)      Pt seen for BSE this date while upright in the bed. Pt,  and nurse all state no concerns at this time with swallowing. Pt reported there has been no hx of Dysphagia however she has pneumonia several times a year. SHe has an outpatient MBS order and SLp encouraged patient to keep this to r/o silent aspiration despite pt and  both answering no to all symptoms discussed this date. Pt tolerated all trials (puree, regular and thin via straw) without overt s/s aspiration. Pt was independent with self feeding, mastication and oral clearance. Pt demonstrated no overt s/s aspiration. At this time pt appears safe with PO intake on current diet. Therapy is not warranted at this time. Contact ST if further questions or needs arise. Pt was educated on rescheduling MBS as an outpatient as it was scheduled for 4/29 at 5 and family cancelled it     ST recommends continued diet of regular solids and thin liquids. Compensatory strategies should include: small bites/sips, pacing/slow rate, and remain upright during all oral intake. Further dysphagia therapy not warranted at this time.       Therapy Time     Time in 430  Time out 500  Total time 30 minutes               Fredrick Castañeda M.S.,CCC-SLP

## 2022-04-29 PROBLEM — E44.1 MILD MALNUTRITION (HCC): Status: ACTIVE | Noted: 2022-04-29

## 2022-04-29 PROBLEM — J84.10 PULMONARY FIBROSIS (HCC): Status: ACTIVE | Noted: 2022-04-29

## 2022-04-29 PROBLEM — J47.1 BRONCHIECTASIS WITH ACUTE EXACERBATION (HCC): Status: ACTIVE | Noted: 2022-04-29

## 2022-04-29 PROBLEM — J44.9 COPD (CHRONIC OBSTRUCTIVE PULMONARY DISEASE) (HCC): Status: ACTIVE | Noted: 2020-10-22

## 2022-04-29 LAB
-: NORMAL
ABSOLUTE EOS #: 0.22 K/UL (ref 0–0.44)
ABSOLUTE IMMATURE GRANULOCYTE: 0.03 K/UL (ref 0–0.3)
ABSOLUTE LYMPH #: 1.27 K/UL (ref 1.1–3.7)
ABSOLUTE MONO #: 0.72 K/UL (ref 0.1–1.2)
ANION GAP SERPL CALCULATED.3IONS-SCNC: 8 MMOL/L (ref 9–17)
BASOPHILS # BLD: 0 % (ref 0–2)
BASOPHILS ABSOLUTE: 0.03 K/UL (ref 0–0.2)
BILIRUBIN URINE: NEGATIVE
BUN BLDV-MCNC: 9 MG/DL (ref 8–23)
BUN/CREAT BLD: 14 (ref 9–20)
CALCIUM SERPL-MCNC: 9.5 MG/DL (ref 8.6–10.4)
CHLORIDE BLD-SCNC: 103 MMOL/L (ref 98–107)
CO2: 31 MMOL/L (ref 20–31)
COLOR: YELLOW
CREAT SERPL-MCNC: 0.65 MG/DL (ref 0.5–0.9)
EKG ATRIAL RATE: 101 BPM
EKG P AXIS: 64 DEGREES
EKG P-R INTERVAL: 178 MS
EKG Q-T INTERVAL: 358 MS
EKG QRS DURATION: 82 MS
EKG QTC CALCULATION (BAZETT): 464 MS
EKG R AXIS: -32 DEGREES
EKG T AXIS: 38 DEGREES
EKG VENTRICULAR RATE: 101 BPM
EOSINOPHILS RELATIVE PERCENT: 2 % (ref 1–4)
EPITHELIAL CELLS UA: NORMAL /HPF (ref 0–25)
GFR AFRICAN AMERICAN: >60 ML/MIN
GFR NON-AFRICAN AMERICAN: >60 ML/MIN
GFR SERPL CREATININE-BSD FRML MDRD: ABNORMAL ML/MIN/{1.73_M2}
GFR SERPL CREATININE-BSD FRML MDRD: ABNORMAL ML/MIN/{1.73_M2}
GLUCOSE BLD-MCNC: 92 MG/DL (ref 70–99)
GLUCOSE URINE: NEGATIVE
HCT VFR BLD CALC: 37.8 % (ref 36.3–47.1)
HEMOGLOBIN: 11.6 G/DL (ref 11.9–15.1)
IMMATURE GRANULOCYTES: 0 %
KETONES, URINE: NEGATIVE
LEUKOCYTE ESTERASE, URINE: NEGATIVE
LYMPHOCYTES # BLD: 12 % (ref 24–43)
MCH RBC QN AUTO: 29.9 PG (ref 25.2–33.5)
MCHC RBC AUTO-ENTMCNC: 30.7 G/DL (ref 28.4–34.8)
MCV RBC AUTO: 97.4 FL (ref 82.6–102.9)
MONOCYTES # BLD: 7 % (ref 3–12)
NITRITE, URINE: NEGATIVE
NRBC AUTOMATED: 0 PER 100 WBC
PDW BLD-RTO: 14.1 % (ref 11.8–14.4)
PH UA: 8.5 (ref 5–9)
PLATELET # BLD: 169 K/UL (ref 138–453)
PMV BLD AUTO: 10 FL (ref 8.1–13.5)
POTASSIUM SERPL-SCNC: 3.9 MMOL/L (ref 3.7–5.3)
PROTEIN UA: NEGATIVE
RBC # BLD: 3.88 M/UL (ref 3.95–5.11)
RBC UA: NORMAL /HPF (ref 0–2)
SEG NEUTROPHILS: 79 % (ref 36–65)
SEGMENTED NEUTROPHILS ABSOLUTE COUNT: 8.31 K/UL (ref 1.5–8.1)
SODIUM BLD-SCNC: 142 MMOL/L (ref 135–144)
SPECIFIC GRAVITY UA: 1.01 (ref 1.01–1.02)
TURBIDITY: CLEAR
URINE HGB: NEGATIVE
UROBILINOGEN, URINE: NORMAL
WBC # BLD: 10.6 K/UL (ref 3.5–11.3)
WBC UA: NORMAL /HPF (ref 0–5)

## 2022-04-29 PROCEDURE — 94669 MECHANICAL CHEST WALL OSCILL: CPT

## 2022-04-29 PROCEDURE — 6370000000 HC RX 637 (ALT 250 FOR IP): Performed by: INTERNAL MEDICINE

## 2022-04-29 PROCEDURE — 85025 COMPLETE CBC W/AUTO DIFF WBC: CPT

## 2022-04-29 PROCEDURE — 36415 COLL VENOUS BLD VENIPUNCTURE: CPT

## 2022-04-29 PROCEDURE — 6360000002 HC RX W HCPCS: Performed by: INTERNAL MEDICINE

## 2022-04-29 PROCEDURE — 93010 ELECTROCARDIOGRAM REPORT: CPT | Performed by: FAMILY MEDICINE

## 2022-04-29 PROCEDURE — 97535 SELF CARE MNGMENT TRAINING: CPT

## 2022-04-29 PROCEDURE — 87205 SMEAR GRAM STAIN: CPT

## 2022-04-29 PROCEDURE — 2700000000 HC OXYGEN THERAPY PER DAY

## 2022-04-29 PROCEDURE — 97110 THERAPEUTIC EXERCISES: CPT

## 2022-04-29 PROCEDURE — 2580000003 HC RX 258: Performed by: INTERNAL MEDICINE

## 2022-04-29 PROCEDURE — 97116 GAIT TRAINING THERAPY: CPT

## 2022-04-29 PROCEDURE — 6360000002 HC RX W HCPCS: Performed by: NURSE PRACTITIONER

## 2022-04-29 PROCEDURE — 80048 BASIC METABOLIC PNL TOTAL CA: CPT

## 2022-04-29 PROCEDURE — 1200000000 HC SEMI PRIVATE

## 2022-04-29 PROCEDURE — 87070 CULTURE OTHR SPECIMN AEROBIC: CPT

## 2022-04-29 PROCEDURE — 81001 URINALYSIS AUTO W/SCOPE: CPT

## 2022-04-29 PROCEDURE — 94640 AIRWAY INHALATION TREATMENT: CPT

## 2022-04-29 PROCEDURE — 94761 N-INVAS EAR/PLS OXIMETRY MLT: CPT

## 2022-04-29 RX ORDER — METHYLPREDNISOLONE SODIUM SUCCINATE 125 MG/2ML
60 INJECTION, POWDER, LYOPHILIZED, FOR SOLUTION INTRAMUSCULAR; INTRAVENOUS EVERY 12 HOURS
Status: DISCONTINUED | OUTPATIENT
Start: 2022-04-29 | End: 2022-05-02 | Stop reason: HOSPADM

## 2022-04-29 RX ADMIN — ASPIRIN 81 MG: 81 TABLET, CHEWABLE ORAL at 09:24

## 2022-04-29 RX ADMIN — RIVAROXABAN 20 MG: 20 TABLET, FILM COATED ORAL at 17:22

## 2022-04-29 RX ADMIN — SODIUM CHLORIDE, PRESERVATIVE FREE 10 ML: 5 INJECTION INTRAVENOUS at 09:26

## 2022-04-29 RX ADMIN — SODIUM CHLORIDE, PRESERVATIVE FREE 10 ML: 5 INJECTION INTRAVENOUS at 20:45

## 2022-04-29 RX ADMIN — IPRATROPIUM BROMIDE AND ALBUTEROL SULFATE 3 ML: 2.5; .5 SOLUTION RESPIRATORY (INHALATION) at 15:36

## 2022-04-29 RX ADMIN — HYDROCODONE BITARTRATE AND ACETAMINOPHEN 1 TABLET: 10; 325 TABLET ORAL at 12:08

## 2022-04-29 RX ADMIN — TRAZODONE HYDROCHLORIDE 200 MG: 50 TABLET ORAL at 20:44

## 2022-04-29 RX ADMIN — GUAIFENESIN AND DEXTROMETHORPHAN 5 ML: 100; 10 SYRUP ORAL at 10:53

## 2022-04-29 RX ADMIN — IPRATROPIUM BROMIDE AND ALBUTEROL SULFATE 3 ML: 2.5; .5 SOLUTION RESPIRATORY (INHALATION) at 05:35

## 2022-04-29 RX ADMIN — PREGABALIN 300 MG: 75 CAPSULE ORAL at 20:44

## 2022-04-29 RX ADMIN — DULOXETINE 60 MG: 60 CAPSULE, DELAYED RELEASE ORAL at 09:24

## 2022-04-29 RX ADMIN — POTASSIUM CHLORIDE 20 MEQ: 1500 TABLET, EXTENDED RELEASE ORAL at 20:44

## 2022-04-29 RX ADMIN — PANTOPRAZOLE SODIUM 40 MG: 40 TABLET, DELAYED RELEASE ORAL at 06:48

## 2022-04-29 RX ADMIN — FUROSEMIDE 20 MG: 20 TABLET ORAL at 09:24

## 2022-04-29 RX ADMIN — PREGABALIN 300 MG: 75 CAPSULE ORAL at 09:24

## 2022-04-29 RX ADMIN — POTASSIUM CHLORIDE 20 MEQ: 1500 TABLET, EXTENDED RELEASE ORAL at 09:24

## 2022-04-29 RX ADMIN — HYDROCODONE BITARTRATE AND ACETAMINOPHEN 1 TABLET: 10; 325 TABLET ORAL at 18:43

## 2022-04-29 RX ADMIN — METHYLPREDNISOLONE SODIUM SUCCINATE 60 MG: 125 INJECTION, POWDER, FOR SOLUTION INTRAMUSCULAR; INTRAVENOUS at 20:44

## 2022-04-29 RX ADMIN — CEFTRIAXONE 1000 MG: 1 INJECTION, POWDER, FOR SOLUTION INTRAMUSCULAR; INTRAVENOUS at 11:04

## 2022-04-29 RX ADMIN — GUAIFENESIN AND DEXTROMETHORPHAN 5 ML: 100; 10 SYRUP ORAL at 06:41

## 2022-04-29 RX ADMIN — GUAIFENESIN AND DEXTROMETHORPHAN 5 ML: 100; 10 SYRUP ORAL at 17:22

## 2022-04-29 RX ADMIN — AZITHROMYCIN MONOHYDRATE 500 MG: 250 TABLET ORAL at 12:08

## 2022-04-29 RX ADMIN — HYDROXYCHLOROQUINE SULFATE 300 MG: 200 TABLET ORAL at 09:24

## 2022-04-29 RX ADMIN — GUAIFENESIN AND DEXTROMETHORPHAN 5 ML: 100; 10 SYRUP ORAL at 20:44

## 2022-04-29 RX ADMIN — IPRATROPIUM BROMIDE AND ALBUTEROL SULFATE 3 ML: 2.5; .5 SOLUTION RESPIRATORY (INHALATION) at 10:41

## 2022-04-29 RX ADMIN — METHYLPREDNISOLONE SODIUM SUCCINATE 60 MG: 125 INJECTION, POWDER, FOR SOLUTION INTRAMUSCULAR; INTRAVENOUS at 09:25

## 2022-04-29 RX ADMIN — LEVOTHYROXINE SODIUM 150 MCG: 150 TABLET ORAL at 06:48

## 2022-04-29 RX ADMIN — IPRATROPIUM BROMIDE AND ALBUTEROL SULFATE 3 ML: 2.5; .5 SOLUTION RESPIRATORY (INHALATION) at 20:17

## 2022-04-29 RX ADMIN — GUAIFENESIN AND DEXTROMETHORPHAN 5 ML: 100; 10 SYRUP ORAL at 13:58

## 2022-04-29 ASSESSMENT — PAIN DESCRIPTION - ONSET: ONSET: GRADUAL

## 2022-04-29 ASSESSMENT — PAIN DESCRIPTION - LOCATION
LOCATION: BACK
LOCATION: BACK

## 2022-04-29 ASSESSMENT — PAIN DESCRIPTION - ORIENTATION
ORIENTATION: LOWER
ORIENTATION: LOWER

## 2022-04-29 ASSESSMENT — PAIN DESCRIPTION - DIRECTION: RADIATING_TOWARDS: DOWN LEG

## 2022-04-29 ASSESSMENT — PAIN DESCRIPTION - PAIN TYPE: TYPE: CHRONIC PAIN

## 2022-04-29 ASSESSMENT — PAIN SCALES - GENERAL
PAINLEVEL_OUTOF10: 7
PAINLEVEL_OUTOF10: 0
PAINLEVEL_OUTOF10: 7

## 2022-04-29 ASSESSMENT — PAIN - FUNCTIONAL ASSESSMENT: PAIN_FUNCTIONAL_ASSESSMENT: ACTIVITIES ARE NOT PREVENTED

## 2022-04-29 ASSESSMENT — PAIN DESCRIPTION - DESCRIPTORS
DESCRIPTORS: ACHING;DISCOMFORT;DULL
DESCRIPTORS: ACHING;DISCOMFORT

## 2022-04-29 ASSESSMENT — PAIN DESCRIPTION - FREQUENCY: FREQUENCY: INTERMITTENT

## 2022-04-29 NOTE — PROGRESS NOTES
Patient in bed at this time resting. Vitals and assessment completed. Vitals stable. Patient on room air which she wears at home. Lungs remain wheezing and rhonchi. Patient is alert and oriented. Patient denies dizziness, numbness or tingling. Writer assisted patient to bathroom to void. Occurrence noted and missed hat. Patient denies needs. Call light within reach.

## 2022-04-29 NOTE — PROGRESS NOTES
Occupational Therapy  Facility/Department: [facility] AT THE [facility] MED SURG  Daily Treatment Note  NAME: Davian Al  : 1945  MRN: [id number]    Date of Service: 2022    Discharge Recommendations:  Continue to assess pending progress,Home with Home health OT         Patient Diagnosis(es): The encounter diagnosis was Multifocal pneumonia. Assessment           Plan   Plan  Times per Week: 6-7x/week  Times per Day: Daily  Plan Weeks: length of hospitalization  Current Treatment Recommendations: Strengthening;Balance training;Functional mobility training; Endurance training;Patient/Caregiver education & training; Safety education & training;Self-Care / ADL     Subjective   Subjective  Subjective: Pt sitting up in bedside chair and agreeable to self care  Pain: Pt staed 7-8/10 pain in back and LLE. nursing notified. pt's L LE will buckle due to pain when walking           Objective    Pt left in chair, call light within reach and nursing notified. Vitals     Balance  Sitting: Without support  Standing: With support  Transfer Training  Transfer Training: Yes  Overall Level of Assistance: Minimum assistance  Interventions: Verbal cues; Safety awareness training  Sit to Stand: Contact-guard assistance  Stand to Sit: Contact-guard assistance  Gait  Overall Level of Assistance: Minimum assistance     ADL  Grooming: Contact guard assistance  Grooming Skilled Clinical Factors: Pt completed grooming standing at sink side for ~10 minutes without sitting  UE Bathing: Minimal assistance  LE Bathing: Minimal assistance  LE Bathing Skilled Clinical Factors: Pt completed bathing in chair at sink side  UE Dressing: Minimal assistance           Patient Education  Education Given To: Patient  Education Provided: Role of Therapy; ADL Adaptive Strategies; Energy Conservation;Transfer Training  Education Method: Demonstration;Verbal  Barriers to Learning: None  Education Outcome: Verbalized understanding;Demonstrated understanding;Continued education needed    Goals  Short Term Goals  Time Frame for Short term goals: 20 visits  Short Term Goal 1: Pt and caregiver will verbalize understanding of d/c folder for improved safety for d/c to home. Short Term Goal 2: Pt will tolerate BUE and FM HEP to increase MMS to 4/5 for increased ease with ADLs. Short Term Goal 3: Pt will demo UB/LB ADLs in seated/standing with no LOB and SBA using AE PRN. Short Term Goal 4: Pt will demo dynamic standing act x7 mins without LOB and CGA.   Patient Goals   Patient goals : to return home       Therapy Time   Individual Concurrent Group Co-treatment   Time In 0833         Time Out 0916         Minutes [address] [place], [place]

## 2022-04-29 NOTE — PROGRESS NOTES
Discussed discharge plans with the patient. Patient is a 80 year old female here with Multifocal pneumonia. She is alert , oriented , pleasant , cooperative during our conversation.     Patient is  and lives at home with her . Thom Pagan has oxygen, walker, wheelchair, shower chair, and grabs at home. The patient does the cleaning and the  does the cooking. She is independent with her ADL's. Her  manages her  medications and her  also provides the transportation. She has no outside services in the home.     Her PCP is Sofia Gibbs MD at this time. Patient is transferring to Dr. Pablo Jordan in June. She has medical insurance that helps with medication costs.     The discharge plan is home with no services at this time. She does not have advance directives but her  would make medical decision if she is unable too.  LSW to monitor and assist with any needs or concerns as they arise.     BUZZ Mcdonald

## 2022-04-29 NOTE — PROGRESS NOTES
Writer came in, patient sitting up in the chair with O2 out of nose. Writer asked patient if she can put it back in and patient replied \"oh I had it out because I was eating supper. \"    Vital signs and assessment completed. Lungs have expiratory wheezes in all quadrants. Cough is present, dry, non-productive. Patient states she is having pain in her back 7/10. Writer gave Norco PO prn at this time for pain. Patient assisted up to the bathroom and requested to go back to the chair at this time. Once back to the chair, patient requested a new gown. Writer assisted patient into a new gown at this time. Patient denies any other needs at this time. Call light, bedside table and personal belongings within reach.

## 2022-04-29 NOTE — PROGRESS NOTES
Patient in chair at this time resting. Vitals and assessment completed. Vitals stable. Patient on 2L oxygen. PRN pain medication administered. Patient denies needs. Call light within reach. Will continue to monitor.

## 2022-04-29 NOTE — PROGRESS NOTES
Sputum taken down to lab at this time. Patient up in chair with  in room. Patient denies having needs. Call light within reach. Will continue to monitor.

## 2022-04-29 NOTE — PROGRESS NOTES
Comprehensive Nutrition Assessment    Type and Reason for Visit:  Initial    Nutrition Recommendations/Plan:   1. Encourage 3 meals daily  2. Protein with each meal, Add 4 oz Ensure Enlive TID (Vanilla)  3. Monitor for swallow difficulty, need for      Malnutrition Assessment:  Malnutrition Status:  Mild malnutrition (04/29/22 1128)    Context:  Acute Illness     Findings of the 6 clinical characteristics of malnutrition:  Energy Intake:  No significant decrease in energy intake  Weight Loss:  No significant weight loss     Body Fat Loss:  No significant body fat loss     Muscle Mass Loss: Moderate muscle mass loss Hand (interosseous)  Fluid Accumulation:  No significant fluid accumulation Extremities   Strength:  Not Performed    Nutrition Assessment:    Predicted inadequate energy intake related to altered GI function as evidenced by nausea, intake 51-75%, and reported abdominal tenderness (post-prandial - at times. Pt reports a good appetite at time of visit. At home pt takes Boost ONS. She also takes omega 3, MVI, Vitamin B 12, and iron. Pt is with COPD, and history of pneumonia 4 times this year. This is the fifth occurrence. Pt has pain across her stomach after eating at times, and currently does not. CBW is 162#, UBW per pt is 145#. Per EHR, wt 3/22/2022 is 154#. This indicates a weight gain of 12# (8.3%) over past month. Pt also reports trouble swallowing pills or food at times, has not had recently. Labs reviewed. Glucose slightly elevated, and is back within limits. Pt is at moderate nutrition risk, and mild malnutrition. Nutrition Related Findings:              Current Nutrition Intake & Therapies:    Average Meal Intake: 51-75%  Average Supplements Intake: None Ordered  ADULT DIET; Regular    Anthropometric Measures:  Height: 5' 5\" (165.1 cm)  Ideal Body Weight (IBW): 125 lbs (57 kg)    Admission Body Weight: 149 lb 14.6 oz (68 kg)  Current Body Weight: 162 lb 4.1 oz (73.6 kg), 129.8 % IBW. Weight Source: Bed Scale  Current BMI (kg/m2): 27  Usual Body Weight: 154 lb (69.9 kg) (3/16/22)  % Weight Change (Calculated): 5.4  Weight Adjustment For: No Adjustment                 BMI Categories: Overweight (BMI 25.0-29. 9)    Estimated Daily Nutrient Needs:  Energy Requirements Based On: Kcal/kg  Weight Used for Energy Requirements: Current  Energy (kcal/day): 5729-5898  Weight Used for Protein Requirements: Ideal  Protein (g/day): 68-80  Method Used for Fluid Requirements: 1 ml/kcal  Fluid (ml/day): 1800+    Nutrition Diagnosis:   · Predicted inadequate energy intake related to altered GI function as evidenced by nausea,intake 51-75% (reported abdominal tenderness)    Recent Labs     04/28/22  1055 04/29/22  0607    142   K 3.8 3.9    103   CO2 30 31   BUN 7* 9   CREATININE 0.68 0.65   GLUCOSE 112* 92   ALT 11  --    ALKPHOS 70  --    GFR                            Lab Results   Component Value Date    LABALBU 4.0 04/28/2022        Nutrition Interventions:   Food and/or Nutrient Delivery: Continue Current Diet,Start Oral Nutrition Supplement  Nutrition Education/Counseling: No recommendation at this time,Education initiated  Coordination of Nutrition Care: Continue to monitor while inpatient  Plan of Care discussed with: Patient    Goals:  Previous Goal Met: Progressing toward Goal(s)  Goals: PO intake 75% or greater,within 2 days       Nutrition Monitoring and Evaluation:   Behavioral-Environmental Outcomes: None Identified  Food/Nutrient Intake Outcomes: Diet Advancement/Tolerance,Supplement Intake  Physical Signs/Symptoms Outcomes: Biochemical Data,Chewing or Swallowing,Nausea or Vomiting,GI Status,Weight    Discharge Planning:    Continue current diet     Campbell Jordan RD, LD  Contact: [phone]

## 2022-04-29 NOTE — H&P
History and Physical    Patient:  Tova Wilde  MRN: [id number]    Chief Complaint:  SOB    History Obtained From:  patient, electronic medical record    PCP: Ozzie Prajapati MD    History of Present Illness: The patient is a 77 y.o. female who presented to the ER with complaints of SOB. Patient has history of COPD with chronic hypoxia with 2L of oxygen continuously. She was transported by EMS from home. Symptom onset \"few days\". She has a history of PN with 4 previous admissions this year alone. She complained of productive cough with complaints of CP with cough. Denied n/v. Denied abdominal pain. Chest xray unchanged extensive bilateral interstitial opacities possibly scarring or fibrosis with history of Covid PN. She was febrile 100.8 upon arrival and oxygen was increased to 4L. She was influenza and covid negative. Past Medical History:        Diagnosis Date    Bronchiectasis with acute exacerbation ([address] 4/29/2022    Chronic pain syndrome     dilaudid infusion pump    COPD (chronic obstructive pulmonary disease) (HCC)     Depression     GERD (gastroesophageal reflux disease)     Hypothyroidism     Osteoporosis     Pulmonary fibrosis ([address] 4/29/2022       Past Surgical History:        Procedure Laterality Date    BACK SURGERY      BREAST SURGERY      CARPAL TUNNEL RELEASE      FRACTURE SURGERY Left 12/20/2018    ORIF wrist    HYSTERECTOMY      JOINT REPLACEMENT      right knee    JOINT REPLACEMENT      WRIST FRACTURE SURGERY Left 12/20/2018    WRIST OPEN REDUCTION INTERNAL FIXATION-DISTAL RADIUS performed by Merrill Hassan MD at [address]       Medications Prior to Admission:    Prior to Admission medications    Medication Sig Start Date End Date Taking?  Authorizing Provider   hydroxychloroquine (PLAQUENIL) 200 MG tablet Take 300 mg by mouth daily   Yes Historical Provider, MD   XARELTO 20 MG TABS tablet TAKE 1 TABLET BY MOUTH  DAILY WITH BREAKFAST 4/8/22   Kip Sever, MD ipratropium-albuterol (DUONEB) 0.5-2.5 (3) MG/3ML SOLN nebulizer solution 3 mLs 11/24/21   Historical Provider, MD   umeclidinium-vilanterol (ANORO ELLIPTA) 62.5-25 MCG/INH AEPB inhaler Inhale 1 puff into the lungs daily 3/16/22   RYAN Stephens CNP   albuterol sulfate  (90 Base) MCG/ACT inhaler Inhale 2 puffs into the lungs 4 times daily 3/16/22   RYAN Stephens CNP   guaiFENesin-dextromethorphan (ROBITUSSIN DM) 100-10 MG/5ML syrup Take 5 mLs by mouth every 4 hours (while awake) 2/10/22   Anand Serna MD   dextrose 5 % SOLN with HYDROmorphone HCl PF 10 MG/ML SOLN 1 mg/mL Infuse intravenously continuous Pt has continuous dilaudid infusion pump implanted, but is unsure of dosage. 2.797 mg/day    Historical Provider, MD   furosemide (LASIX) 20 MG tablet Take 1 tablet by mouth daily  Patient taking differently: Take 20 mg by mouth daily PRN 5/14/21   Leeanne Seo MD   ascorbic acid (VITAMIN C) 1000 MG tablet Take 1 tablet by mouth 2 times daily for 7 days 12/29/20 3/16/22  RYAN Maza CNP   Vitamin D (CHOLECALCIFEROL) 50 MCG (2000 UT) TABS tablet Take 1 tablet by mouth daily for 7 days 12/30/20 3/16/22  RYAN Maza CNP   HYDROcodone-acetaminophen (NORCO)  MG per tablet Take 1 tablet by mouth every 6 hours as needed for Pain. Historical Provider, MD   pregabalin (LYRICA) 300 MG capsule Take 1 capsule by mouth 2 times daily for 30 days.  11/6/20 3/16/22  Danisha Harrison MD   levothyroxine (SYNTHROID) 150 MCG tablet Take 150 mcg by mouth Daily     Historical Provider, MD   aspirin 81 MG tablet Take 81 mg by mouth daily    Historical Provider, MD   DULoxetine (CYMBALTA) 60 MG extended release capsule Take 60 mg by mouth daily    Historical Provider, MD   traZODone (DESYREL) 100 MG tablet Take 200 mg by mouth nightly     Historical Provider, MD   pantoprazole sodium (PROTONIX) 40 MG PACK packet Take 40 mg by mouth 2 times daily (before meals)    Historical Provider, MD   potassium chloride (KLOR-CON) 20 MEQ packet Take 20 mEq by mouth 2 times daily    Historical Provider, MD   calcium citrate-vitamin D (CITRICAL + D) 315-250 MG-UNIT TABS per tablet Take 1 tablet by mouth 2 times daily (with meals)    Historical Provider, MD   alendronate (FOSAMAX) 70 MG tablet Take 70 mg by mouth every 7 days On Sundays    Historical Provider, MD       Allergies:  Patient has no known allergies. Social History:   TOBACCO:   reports that she quit smoking about 45 years ago. She has a 10.00 pack-year smoking history. She has never used smokeless tobacco.  ETOH:   reports no history of alcohol use. Family History:       Problem Relation Age of Onset    Heart Attack Father 61    Heart Attack Sister     Heart Disease Brother        Allergies:  Patient has no known allergies. Medications Prior to Admission:    Prior to Admission medications    Medication Sig Start Date End Date Taking? Authorizing Provider   hydroxychloroquine (PLAQUENIL) 200 MG tablet Take 300 mg by mouth daily   Yes Historical Provider, MD   XARELTO 20 MG TABS tablet TAKE 1 TABLET BY MOUTH  DAILY WITH BREAKFAST 4/8/22   Valarie Mcelroy MD   ipratropium-albuterol (DUONEB) 0.5-2.5 (3) MG/3ML SOLN nebulizer solution 3 mLs 11/24/21   Historical Provider, MD   umeclidinium-vilanterol [place] ELLIPTA) 62.5-25 MCG/INH AEPB inhaler Inhale 1 puff into the lungs daily 3/16/22   RYAN Rodney - CNP   albuterol sulfate  (90 Base) MCG/ACT inhaler Inhale 2 puffs into the lungs 4 times daily 3/16/22   RYAN Rodney CNP   guaiFENesin-dextromethorphan (ROBITUSSIN DM) 100-10 MG/5ML syrup Take 5 mLs by mouth every 4 hours (while awake) 2/10/22   Carlita Burdick MD   dextrose 5 % SOLN with HYDROmorphone HCl PF 10 MG/ML SOLN 1 mg/mL Infuse intravenously continuous Pt has continuous dilaudid infusion pump implanted, but is unsure of dosage.  2.797 mg/day    Historical Provider, MD   furosemide (LASIX) 20 MG tablet Take 1 tablet by mouth daily  Patient taking differently: Take 20 mg by mouth daily PRN 5/14/21   Debi Garcia MD   ascorbic acid (VITAMIN C) 1000 MG tablet Take 1 tablet by mouth 2 times daily for 7 days 12/29/20 3/16/22  RYAN Aleman CNP   Vitamin D (CHOLECALCIFEROL) 50 MCG (2000 UT) TABS tablet Take 1 tablet by mouth daily for 7 days 12/30/20 3/16/22  RYAN Aleman CNP   HYDROcodone-acetaminophen (NORCO)  MG per tablet Take 1 tablet by mouth every 6 hours as needed for Pain. Historical Provider, MD   pregabalin (LYRICA) 300 MG capsule Take 1 capsule by mouth 2 times daily for 30 days. 11/6/20 3/16/22  Edel Mendenhall MD   levothyroxine (SYNTHROID) 150 MCG tablet Take 150 mcg by mouth Daily     Historical Provider, MD   aspirin 81 MG tablet Take 81 mg by mouth daily    Historical Provider, MD   DULoxetine (CYMBALTA) 60 MG extended release capsule Take 60 mg by mouth daily    Historical Provider, MD   traZODone (DESYREL) 100 MG tablet Take 200 mg by mouth nightly     Historical Provider, MD   pantoprazole sodium (PROTONIX) 40 MG PACK packet Take 40 mg by mouth 2 times daily (before meals)    Historical Provider, MD   potassium chloride (KLOR-CON) 20 MEQ packet Take 20 mEq by mouth 2 times daily    Historical Provider, MD   calcium citrate-vitamin D (CITRICAL + D) 315-250 MG-UNIT TABS per tablet Take 1 tablet by mouth 2 times daily (with meals)    Historical Provider, MD   alendronate (FOSAMAX) 70 MG tablet Take 70 mg by mouth every 7 days On Sundays    Historical Provider, MD       Review of Systems:  Constitutional:positive  for fevers, and negative for chills.   Eyes: negative for visual disturbance   ENT: negative for sore throat, negative nasal congestion, and negative for earache  Respiratory: positive for shortness of breath, positive for cough, and negative for wheezing  Cardiovascular: positive for chest pain, negative for palpitations, and negative for syncope  Gastrointestinal: positive for abdominal pain, negative for nausea,negative for vomiting, negative for diarrhea, negative for constipation, and negative for hematochezia or melena  Genitourinary: positive for dysuria, negative for urinary urgency, negative for urinary frequency, and negative for hematuria  Skin: negative for skin rash, and negative for skin lesions  Neurological: negative for unilateral weakness, numbness or tingling. Physical Exam:    Vitals:   Temp: 98 °F (36.7 °C)  BP: (!) 101/56  Resp: 18  Pulse: 95  SpO2: 95 %  24HR INTAKE/OUTPUT:      Intake/Output Summary (Last 24 hours) at 4/29/2022 1239  Last data filed at 4/29/2022 1123  Gross per 24 hour   Intake --   Output 850 ml   Net -850 ml       Weight    Body mass index is 27.01 kg/m². Exam:  GEN:    Awake, alert and oriented x3. EYES:  EOMI, pupils equal   NECK: Supple. No lymphadenopathy. No carotid bruit  CVS:    regular rate and rhythm, no audible murmur  PULM:  coarse rhonchi on right with wheezing on left posterior, no acute respiratory distress  ABD:    Bowels sounds normal.  Abdomen is soft. No distention. no tenderness to palpation. EXT:   no edema bilaterally . No calf tenderness. NEURO: Moves all extremities. Motor and sensory are grossly intact  SKIN:  No rashes.   No skin lesions.    -----------------------------------------------------------------  Diagnostic Data:     DATA:    CBC:   Lab Results   Component Value Date    WBC 10.6 04/29/2022    RBC 3.88 (L) 04/29/2022    HGB 11.6 (L) 04/29/2022    HCT 37.8 04/29/2022    MCV 97.4 04/29/2022     04/29/2022        CMP:   Lab Results   Component Value Date    GLUCOSE 92 04/29/2022    BUN 9 04/29/2022    CREATININE 0.65 04/29/2022     04/29/2022    K 3.9 04/29/2022    CALCIUM 9.5 04/29/2022     04/29/2022    CO2 31 04/29/2022    PROT 6.7 04/28/2022    LABALBU 4.0 04/28/2022    BILITOT 0.58 04/28/2022    ALKPHOS 70 04/28/2022    ALT 11 04/28/2022 AST 18 04/28/2022       UA:   Lab Results   Component Value Date    COLORU Yellow 04/29/2022    SPECGRAV 1.015 04/29/2022    WBCUA 2 TO 5 04/29/2022    RBCUA 0 TO 2 04/29/2022    EPITHUA 0 TO 2 04/29/2022    LEUKOCYTESUR NEGATIVE 04/29/2022    GLUCOSEU NEGATIVE 04/29/2022    KETUA NEGATIVE 04/29/2022    PROTEINU NEGATIVE 04/29/2022    HGBUR NEGATIVE 04/29/2022    CASTUA NOT REPORTED 10/14/2021    CRYSTUA NOT REPORTED 10/14/2021    BACTERIA NOT REPORTED 10/14/2021    YEAST NOT REPORTED 10/14/2021       Lactic Acid:   Lab Results   Component Value Date    LACTA 1.9 04/28/2022       D-Dimer:  Lab Results   Component Value Date    DDIMER 0.69 (H) 01/18/2022       PT/INR:  Lab Results   Component Value Date    PROTIME 15.6 10/14/2021    INR 1.3 10/14/2021       High Sensitivity Troponin:  No results for input(s): TROPHS in the last 72 hours. ABGs:   Lab Results   Component Value Date    PHART 7.398 02/06/2022    ZDY3CQX 54.3 02/06/2022    PO2ART 75.9 02/06/2022    DPC8YZR 32.7 02/06/2022    XZA6LDX 35 11/02/2020    M2ODPCRC 95.0 02/06/2022    FIO2 NOT REPORTED 02/06/2022           CT CHEST WO CONTRAST   Final Result   Moderate multifocal ground-glass opacities consistent with patient's history   of COVID-19, somewhat improved. Other incidental findings as above. XR CHEST PORTABLE   Final Result   Unchanged extensive bilateral interstitial opacities could represent scarring   or fibrosis the patient's known COVID pneumonia               EKG reviewed    Assessment:    Principal Problem:    Community acquired bacterial pneumonia  Active Problems:    Pulmonary fibrosis ([address]    Bronchiectasis with acute exacerbation (HCC)    Chronic midline low back pain without sciatica    Acute respiratory failure with hypoxia and hypercapnia (HCC)  Resolved Problems:    * No resolved hospital problems.  *      Patient Active Problem List    Diagnosis Date Noted    Pulmonary fibrosis ([address] 04/29/2022    Bronchiectasis with acute exacerbation ([facility] 04/29/2022    Community acquired bacterial pneumonia 04/28/2022    Atypical pneumonia 02/05/2022    New onset a-fib ([facility] 02/01/2022    Community acquired pneumonia 01/16/2022    Anemia 10/14/2021    Biventricular congestive heart failure ([facility]     Acute respiratory failure with hypoxia and hypercapnia ([facility]     Hypothyroidism 10/05/2018    Major depressive disorder 10/05/2018    Chronic midline low back pain without sciatica 10/05/2018    Iron deficiency anemia 10/05/2018       Plan:     · This patient requires inpatient admission because of community-acquired bacterial pneumonia  · Factors affecting the medical complexity of this patient include bronchiectasis, pulmonary fibrosis, acute on chronic respiratory failure with hypoxia and hypercapnia  · Estimated length of stay is 3 days  · Community-acquired bacterial pneumonia  · Appreciate pulmonology  · No bronchoscopy  · Current CT is improved from February 2022  · Likely colonized with bacteria  · Likely will be recurrent due to fibrosis from COVID-19 infection and bronchiectasis  · Plan to follow-up as an outpatient in 1 month with a repeat CT scan prior to that visit  · IV Rocephin and Zithromax  · Nebs  · IV Solu-Medrol  · Out of bed with meals  · Acute on chronic respiratory failure with hypoxia and hypercapnia  · Acapella  · Nebs  · Baseline supplemental oxygen 2 L  · COPD  · Continue Stiolto, nebs  · IV Solu-Medrol  · DVT prophylaxis: already on chronic anticoagulation  · Peptic ulcer prophylaxis: Pepcid  · High risk medications: none  · Social Service and Case Management consults for DC planning  · Dietician consult initiated    CORE MEASURES  DVT prophylaxis: already on chronic anticoagulation  Decubitus ulcer present on admission: No  CODE STATUS: FULL CODE  Nutrition Status: good   Physical therapy: No   Old Charts reviewed: Yes  EKG Reviewed:  Yes  Advance Directive Addressed: Yes    Rufina Lion, RYAN - ADRI, APRN, NP-C  4/29/2022, 12:39 PM

## 2022-04-29 NOTE — PROGRESS NOTES
Physician Progress Note      PATIENT:               Nirmala JACKSON #:                  [id number]  :                       1945  ADMIT DATE:       2022 10:35 AM  DISCH DATE:  RESPONDING  PROVIDER #:        Crissy Beltran MD          QUERY TEXT:    Pt admitted with bacterial pneumonia. Initial vital signs T 100.8, , RR   23,   WBC 14.7. If possible, please document in the progress notes and discharge summary if   you are evaluating and /or treating any of the following: The medical record reflects the following:  Risk Factors: COPD, pulmonary fibrosis, history of PN with 4 previous   admissions this year alone, hx Covid-19 infection, on home O2 continuously  Clinical Indicators:  Positive for fevers, SpO2 88% on 2 lpm nc on ED arrival;    initial vital signs T 100.8 F , , RR 23,   WBC 14.7. Treatment: IV NS bolus in ED,  IV Zithromax, IV Rocephin    Tahira Bhatia RN, [address]   [phone]  . Options provided:  -- Sepsis, present on admission, due to pneumonia  -- Pneumonia, without sepsis  -- Other - I will add my own diagnosis  -- Disagree - Not applicable / Not valid  -- Disagree - Clinically unable to determine / Unknown  -- Refer to Clinical Documentation Reviewer    PROVIDER RESPONSE TEXT:    This patient has pneumonia without sepsis.     Query created by: Rebecca Seymour on 2022 1:48 PM      Electronically signed by:  Crissy Beltran MD 2022 4:18 PM

## 2022-04-29 NOTE — PROGRESS NOTES
Physical Therapy  Facility/Department: [facility] AT THE [facility] MED SURG  Physical Therapy Initial Assessment    Name: Vadim Minor  : 1945  MRN: [id number]  Date of Service: 2022    Discharge Recommendations:  Continue to assess pending progress,Patient would benefit from continued therapy after discharge          Patient Diagnosis(es): The encounter diagnosis was Multifocal pneumonia. Past Medical History:  has a past medical history of Chronic pain syndrome, COPD (chronic obstructive pulmonary disease) ([address] Depression, GERD (gastroesophageal reflux disease), Hypothyroidism, and Osteoporosis. Past Surgical History:  has a past surgical history that includes Hysterectomy; back surgery; Breast surgery; Carpal tunnel release; joint replacement; joint replacement; fracture surgery (Left, 2018); and Wrist fracture surgery (Left, 2018). Assessment   Body Structures, Functions, Activity Limitations Requiring Skilled Therapeutic Intervention: Decreased functional mobility ; Decreased endurance;Decreased strength;Decreased balance; Increased pain  Assessment: Pt was referred for general debility and difficulty walking. Progress as tolerated with goal of home independent.   Treatment Diagnosis: difficulty walking  Therapy Prognosis: Good  Decision Making: Low Complexity  Barriers to Learning: none  Requires PT Follow-Up: Yes  Activity Tolerance  Activity Tolerance: Patient tolerated evaluation without incident     Plan   Plan  Plan: 2 times a day 7 days a week (with exception of weekends, daily)  Current Treatment Recommendations: Strengthening,Balance training,Functional mobility training,Transfer training,Endurance training,Gait training,Stair training,Neuromuscular re-education,Home exercise program,Safety education & training  Plan Comment: Initiate POC  Safety Devices  Type of Devices: Call light within reach,Chair alarm in place,Gait belt,Left in chair Restrictions  Restrictions/Precautions  Restrictions/Precautions: None,Other (comment) (On O2 continuous)     Subjective   General  Chart Reviewed: Yes  Patient assessed for rehabilitation services?: Yes  Follows Commands: Within Functional Limits  Subjective  Subjective: Pt states she is not feeling much better yet. Pt states she continues with difficulty breathing. Social/Functional History  Social/Functional History  Lives With: Spouse  Type of Home: House  Home Layout: Two level,Able to Live on Main level with bedroom/bathroom  Home Access: Stairs to enter with rails  Entrance Stairs - Number of Steps: 1 step in garage, 3 steps outside  Entrance Stairs - Rails: Right  Bathroom Shower/Tub: Tub/Shower unit  Bathroom Toilet: Handicap height  Bathroom Equipment: Grab bars around toilet,Grab bars in [facility] chair  Home Equipment: Cane,Walker, rolling  Has the patient had two or more falls in the past year or any fall with injury in the past year?: No  Receives Help From: Family  ADL Assistance: [address] [place] [address] Avenue: Independent  Homemaking Responsibilities: Yes  Ambulation Assistance: Independent  Transfer Assistance: Independent  Active : Yes  Patient's  Info: Pt notes that she has a back problem and is in fear it's \"a disc. \" It has made walking difficult. Vision/Hearing  Vision Exceptions: Wears glasses at all times  Hearing: Within functional limits    Cognition   Orientation  Overall Orientation Status: Within Normal Limits     Objective   Pulse: 78  Heart Rate Source: Monitor  BP: 115/62  BP Location: Right upper arm  Patient Position: Semi fowlers  MAP (Calculated): 79.67  Resp: 17  SpO2: 92 %  O2 Device: Nasal cannula     Observation/Palpation  Observation: Pt is having difficulty with WB during ambulation.  Guard with mobility  Gross Assessment  AROM: Within functional limits  PROM: Within functional limits  Strength: Generally decreased, functional     Bed Mobility Training  Bed Mobility Training: Yes  Overall Level of Assistance: Stand-by assistance  Rolling: Stand-by assistance  Supine to Sit: Stand-by assistance  Scooting: Stand-by assistance  Balance  Sitting: Intact  Sitting - Static: Good (unsupported)  Sitting - Dynamic: Good (unsupported)  Standing: Impaired  Standing - Static: Good  Standing - Dynamic: Fair  Transfer Training  Transfer Training: Yes  Overall Level of Assistance: Contact-guard assistance  Interventions: Safety awareness training;Verbal cues  Sit to Stand: Contact-guard assistance  Stand to Sit: Contact-guard assistance  Stand Pivot Transfers: Contact-guard assistance  Bed to Chair: Contact-guard assistance  Gait  Overall Level of Assistance: Minimum assistance    OutComes Score    AM-PAC Score  AM-PAC Inpatient Mobility Raw Score : 17 (04/29/22 1023)  AM-PAC Inpatient T-Scale Score : 42.13 (04/29/22 1023)  Mobility Inpatient CMS 0-100% Score: 50.57 (04/29/22 1023)  Mobility Inpatient CMS G-Code Modifier : CK (04/29/22 1023)          Goals  Short Term Goals  Time Frame for Short term goals: 20 days  Short term goal 1: Initiate HEP and progress as tolerated for improved tolerance to ADLs  Short term goal 2: Pt will be independent with bed mobility in order to discharge home with spouse. Short term goal 3: Pt will transfer with modified independence with least restrictive device and good safety to reduce fall risk. Short term goal 4: Pt will ambulate up to 100 feet in order to exit and enter her home safely for appointments. Short term goal 5: Pt will negotiate 1 to 2 steps with rails for negotiation of step to enter home safely.   Patient Goals   Patient goals : return home       Therapy Time   Individual Concurrent Group Co-treatment   Time In 0829         Time Out 0859         Minutes 30         Timed Code Treatment Minutes: [address], PT, DPT

## 2022-04-29 NOTE — PROGRESS NOTES
Physical Therapy  Facility/Department: [facility] AT THE [facility] MED SURG  Daily Treatment Note  NAME: Karissa Blake  : 1945  MRN: [id number]    Date of Service: 2022    Discharge Recommendations:  Continue to assess pending progress,Patient would benefit from continued therapy after discharge        Patient Diagnosis(es): The encounter diagnosis was Multifocal pneumonia. Assessment   Activity Tolerance: Patient tolerated evaluation without incident     Plan    Plan  Plan: 2 times a day 7 days a week  Current Treatment Recommendations: Strengthening;Balance training;Functional mobility training;Transfer training; Endurance training;Gait training;Stair training;Neuromuscular re-education;Home exercise program;Safety education & training  Plan Comment: Initiate POC     Subjective    Subjective  Subjective: Pt pleasant and agreeable for treatment. Pain: Pt reports LLE pain 8/10. States it starts from left side low back and travels all the way down to her toes.   Orientation  Overall Orientation Status: Within Normal Limits     Objective   Vitals     Bed Mobility Training  Bed Mobility Training:  (Pt up in recliner upon arrival, declined to lie down at this time)  Overall Level of Assistance: Stand-by assistance  Rolling: Stand-by assistance  Supine to Sit: Stand-by assistance  Scooting: Stand-by assistance  Balance  Sitting: Intact  Sitting - Static: Good (unsupported)  Sitting - Dynamic: Good (unsupported)  Standing: Impaired  Standing - Static: Good  Standing - Dynamic: Fair  Transfer Training  Transfer Training: Yes  Overall Level of Assistance: Contact-guard assistance  Interventions: Safety awareness training;Verbal cues  Sit to Stand: Contact-guard assistance  Stand to Sit: Contact-guard assistance  Stand Pivot Transfers: Contact-guard assistance  Bed to Chair: Contact-guard assistance  Gait Training  Gait Training: Yes  Gait  Overall Level of Assistance: Minimum assistance  Base of Support: Widened;Shift to right  Speed/Helen: Slow  Swing Pattern: Left asymmetrical  Stance: Left decreased  Gait Abnormalities: Antalgic  Distance (ft): 10 Feet (Completed forward/retro)  Assistive Device: Gait belt;Walker; Walker, rolling     PT Exercises  Exercise Treatment: Pt completed BLE ex. RLE, all planes x 20 reps. LLE, all planes but limited d/t pain        Patient Education  Education Given To: Patient  Education Provided: Plan of Care;Role of Therapy  Education Method: Verbal;Demonstration  Barriers to Learning: None    Goals  Short Term Goals  Time Frame for Short term goals: 20 days  Short term goal 1: Initiate HEP and progress as tolerated for improved tolerance to ADLs  Short term goal 2: Pt will be independent with bed mobility in order to discharge home with spouse. Short term goal 3: Pt will transfer with modified independence with least restrictive device and good safety to reduce fall risk. Short term goal 4: Pt will ambulate up to 100 feet in order to exit and enter her home safely for appointments. Short term goal 5: Pt will negotiate 1 to 2 steps with rails for negotiation of step to enter home safely.   Patient Goals   Patient goals : return home      Therapy Time   Individual Concurrent Group Co-treatment   Time In 1427         Time Out 1452         Minutes 25         Timed Code Treatment Minutes: [address] [facility]

## 2022-04-30 LAB
ABSOLUTE EOS #: <0.03 K/UL (ref 0–0.44)
ABSOLUTE IMMATURE GRANULOCYTE: 0.05 K/UL (ref 0–0.3)
ABSOLUTE LYMPH #: 0.62 K/UL (ref 1.1–3.7)
ABSOLUTE MONO #: 0.16 K/UL (ref 0.1–1.2)
ANION GAP SERPL CALCULATED.3IONS-SCNC: 12 MMOL/L (ref 9–17)
BASOPHILS # BLD: 0 % (ref 0–2)
BASOPHILS ABSOLUTE: <0.03 K/UL (ref 0–0.2)
BUN BLDV-MCNC: 11 MG/DL (ref 8–23)
BUN/CREAT BLD: 21 (ref 9–20)
CALCIUM SERPL-MCNC: 10.2 MG/DL (ref 8.6–10.4)
CHLORIDE BLD-SCNC: 101 MMOL/L (ref 98–107)
CO2: 27 MMOL/L (ref 20–31)
CREAT SERPL-MCNC: 0.53 MG/DL (ref 0.5–0.9)
CULTURE: ABNORMAL
DIRECT EXAM: ABNORMAL
EOSINOPHILS RELATIVE PERCENT: 0 % (ref 1–4)
GFR AFRICAN AMERICAN: >60 ML/MIN
GFR NON-AFRICAN AMERICAN: >60 ML/MIN
GFR SERPL CREATININE-BSD FRML MDRD: ABNORMAL ML/MIN/{1.73_M2}
GFR SERPL CREATININE-BSD FRML MDRD: ABNORMAL ML/MIN/{1.73_M2}
GLUCOSE BLD-MCNC: 145 MG/DL (ref 70–99)
HCT VFR BLD CALC: 42.9 % (ref 36.3–47.1)
HEMOGLOBIN: 13.1 G/DL (ref 11.9–15.1)
IMMATURE GRANULOCYTES: 1 %
LYMPHOCYTES # BLD: 7 % (ref 24–43)
MCH RBC QN AUTO: 30.1 PG (ref 25.2–33.5)
MCHC RBC AUTO-ENTMCNC: 30.5 G/DL (ref 28.4–34.8)
MCV RBC AUTO: 98.6 FL (ref 82.6–102.9)
MONOCYTES # BLD: 2 % (ref 3–12)
NRBC AUTOMATED: 0 PER 100 WBC
PDW BLD-RTO: 13.9 % (ref 11.8–14.4)
PLATELET # BLD: 204 K/UL (ref 138–453)
PMV BLD AUTO: 10 FL (ref 8.1–13.5)
POTASSIUM SERPL-SCNC: 3.9 MMOL/L (ref 3.7–5.3)
RBC # BLD: 4.35 M/UL (ref 3.95–5.11)
SEG NEUTROPHILS: 90 % (ref 36–65)
SEGMENTED NEUTROPHILS ABSOLUTE COUNT: 7.8 K/UL (ref 1.5–8.1)
SODIUM BLD-SCNC: 140 MMOL/L (ref 135–144)
SPECIMEN DESCRIPTION: ABNORMAL
WBC # BLD: 8.6 K/UL (ref 3.5–11.3)

## 2022-04-30 PROCEDURE — 85025 COMPLETE CBC W/AUTO DIFF WBC: CPT

## 2022-04-30 PROCEDURE — 97110 THERAPEUTIC EXERCISES: CPT

## 2022-04-30 PROCEDURE — 2580000003 HC RX 258: Performed by: INTERNAL MEDICINE

## 2022-04-30 PROCEDURE — 94664 DEMO&/EVAL PT USE INHALER: CPT

## 2022-04-30 PROCEDURE — 6370000000 HC RX 637 (ALT 250 FOR IP): Performed by: INTERNAL MEDICINE

## 2022-04-30 PROCEDURE — 2700000000 HC OXYGEN THERAPY PER DAY

## 2022-04-30 PROCEDURE — 97535 SELF CARE MNGMENT TRAINING: CPT

## 2022-04-30 PROCEDURE — 80048 BASIC METABOLIC PNL TOTAL CA: CPT

## 2022-04-30 PROCEDURE — 94761 N-INVAS EAR/PLS OXIMETRY MLT: CPT

## 2022-04-30 PROCEDURE — 6360000002 HC RX W HCPCS: Performed by: INTERNAL MEDICINE

## 2022-04-30 PROCEDURE — 1200000000 HC SEMI PRIVATE

## 2022-04-30 PROCEDURE — 6360000002 HC RX W HCPCS: Performed by: NURSE PRACTITIONER

## 2022-04-30 PROCEDURE — 36415 COLL VENOUS BLD VENIPUNCTURE: CPT

## 2022-04-30 PROCEDURE — 94640 AIRWAY INHALATION TREATMENT: CPT

## 2022-04-30 PROCEDURE — 97116 GAIT TRAINING THERAPY: CPT

## 2022-04-30 PROCEDURE — 94669 MECHANICAL CHEST WALL OSCILL: CPT

## 2022-04-30 RX ADMIN — IPRATROPIUM BROMIDE AND ALBUTEROL SULFATE 3 ML: 2.5; .5 SOLUTION RESPIRATORY (INHALATION) at 05:38

## 2022-04-30 RX ADMIN — PREGABALIN 300 MG: 75 CAPSULE ORAL at 20:31

## 2022-04-30 RX ADMIN — PANTOPRAZOLE SODIUM 40 MG: 40 TABLET, DELAYED RELEASE ORAL at 06:58

## 2022-04-30 RX ADMIN — GUAIFENESIN AND DEXTROMETHORPHAN 5 ML: 100; 10 SYRUP ORAL at 22:43

## 2022-04-30 RX ADMIN — GUAIFENESIN AND DEXTROMETHORPHAN 5 ML: 100; 10 SYRUP ORAL at 06:58

## 2022-04-30 RX ADMIN — SODIUM CHLORIDE, PRESERVATIVE FREE 10 ML: 5 INJECTION INTRAVENOUS at 20:32

## 2022-04-30 RX ADMIN — PREGABALIN 300 MG: 75 CAPSULE ORAL at 09:04

## 2022-04-30 RX ADMIN — GUAIFENESIN AND DEXTROMETHORPHAN 5 ML: 100; 10 SYRUP ORAL at 18:01

## 2022-04-30 RX ADMIN — POTASSIUM CHLORIDE 20 MEQ: 1500 TABLET, EXTENDED RELEASE ORAL at 09:04

## 2022-04-30 RX ADMIN — METHYLPREDNISOLONE SODIUM SUCCINATE 60 MG: 125 INJECTION, POWDER, FOR SOLUTION INTRAMUSCULAR; INTRAVENOUS at 09:01

## 2022-04-30 RX ADMIN — RIVAROXABAN 20 MG: 20 TABLET, FILM COATED ORAL at 18:01

## 2022-04-30 RX ADMIN — GUAIFENESIN AND DEXTROMETHORPHAN 5 ML: 100; 10 SYRUP ORAL at 11:26

## 2022-04-30 RX ADMIN — ASPIRIN 81 MG: 81 TABLET, CHEWABLE ORAL at 09:01

## 2022-04-30 RX ADMIN — HYDROXYCHLOROQUINE SULFATE 300 MG: 200 TABLET ORAL at 09:01

## 2022-04-30 RX ADMIN — GUAIFENESIN AND DEXTROMETHORPHAN 5 ML: 100; 10 SYRUP ORAL at 15:06

## 2022-04-30 RX ADMIN — FUROSEMIDE 20 MG: 20 TABLET ORAL at 09:01

## 2022-04-30 RX ADMIN — IPRATROPIUM BROMIDE AND ALBUTEROL SULFATE 3 ML: 2.5; .5 SOLUTION RESPIRATORY (INHALATION) at 15:54

## 2022-04-30 RX ADMIN — HYDROCODONE BITARTRATE AND ACETAMINOPHEN 1 TABLET: 10; 325 TABLET ORAL at 15:42

## 2022-04-30 RX ADMIN — DULOXETINE 60 MG: 60 CAPSULE, DELAYED RELEASE ORAL at 09:01

## 2022-04-30 RX ADMIN — CEFTRIAXONE 1000 MG: 1 INJECTION, POWDER, FOR SOLUTION INTRAMUSCULAR; INTRAVENOUS at 11:31

## 2022-04-30 RX ADMIN — METHYLPREDNISOLONE SODIUM SUCCINATE 60 MG: 125 INJECTION, POWDER, FOR SOLUTION INTRAMUSCULAR; INTRAVENOUS at 20:31

## 2022-04-30 RX ADMIN — TRAZODONE HYDROCHLORIDE 200 MG: 50 TABLET ORAL at 22:43

## 2022-04-30 RX ADMIN — AZITHROMYCIN MONOHYDRATE 500 MG: 250 TABLET ORAL at 11:26

## 2022-04-30 RX ADMIN — POTASSIUM CHLORIDE 20 MEQ: 1500 TABLET, EXTENDED RELEASE ORAL at 20:31

## 2022-04-30 RX ADMIN — HYDROCODONE BITARTRATE AND ACETAMINOPHEN 1 TABLET: 10; 325 TABLET ORAL at 05:59

## 2022-04-30 RX ADMIN — HYDROCODONE BITARTRATE AND ACETAMINOPHEN 1 TABLET: 10; 325 TABLET ORAL at 22:43

## 2022-04-30 RX ADMIN — IPRATROPIUM BROMIDE AND ALBUTEROL SULFATE 3 ML: 2.5; .5 SOLUTION RESPIRATORY (INHALATION) at 10:25

## 2022-04-30 RX ADMIN — IPRATROPIUM BROMIDE AND ALBUTEROL SULFATE 3 ML: 2.5; .5 SOLUTION RESPIRATORY (INHALATION) at 20:18

## 2022-04-30 RX ADMIN — SODIUM CHLORIDE, PRESERVATIVE FREE 10 ML: 5 INJECTION INTRAVENOUS at 09:04

## 2022-04-30 RX ADMIN — LEVOTHYROXINE SODIUM 150 MCG: 150 TABLET ORAL at 06:58

## 2022-04-30 ASSESSMENT — PAIN DESCRIPTION - PAIN TYPE
TYPE: CHRONIC PAIN

## 2022-04-30 ASSESSMENT — PAIN DESCRIPTION - FREQUENCY
FREQUENCY: CONTINUOUS

## 2022-04-30 ASSESSMENT — PAIN - FUNCTIONAL ASSESSMENT
PAIN_FUNCTIONAL_ASSESSMENT: ACTIVITIES ARE NOT PREVENTED

## 2022-04-30 ASSESSMENT — PAIN DESCRIPTION - DIRECTION
RADIATING_TOWARDS: DOWN LEFT LEG
RADIATING_TOWARDS: DOWN LEG
RADIATING_TOWARDS: DOWN LEFT LEG

## 2022-04-30 ASSESSMENT — PAIN DESCRIPTION - DESCRIPTORS
DESCRIPTORS: ACHING

## 2022-04-30 ASSESSMENT — PAIN DESCRIPTION - ORIENTATION
ORIENTATION: LOWER

## 2022-04-30 ASSESSMENT — PAIN DESCRIPTION - LOCATION
LOCATION: BACK

## 2022-04-30 ASSESSMENT — PAIN DESCRIPTION - ONSET
ONSET: ON-GOING

## 2022-04-30 ASSESSMENT — PAIN SCALES - GENERAL
PAINLEVEL_OUTOF10: 7
PAINLEVEL_OUTOF10: 7
PAINLEVEL_OUTOF10: 5
PAINLEVEL_OUTOF10: 7
PAINLEVEL_OUTOF10: 5
PAINLEVEL_OUTOF10: 8
PAINLEVEL_OUTOF10: 7

## 2022-04-30 NOTE — PROGRESS NOTES
Occupational Therapy  Facility/Department: [facility] AT THE [facility] MED SURG  Daily Treatment Note  NAME: Chriss Escobar  : 1945  MRN: [id number]    Date of Service: 2022    Discharge Recommendations:  Continue to assess pending progress,Home with Home health OT         Patient Diagnosis(es): The encounter diagnosis was Multifocal pneumonia. Assessment           Plan         Subjective   Subjective  Subjective: pt up in recliner upon arrival, agreeable to therapy, no c/o this am, pt did report she is continuing to [employer] alot\"           Objective    Vitals     Balance  Sitting - Static: Good (unsupported)  Sitting - Dynamic: Good (unsupported)  Standing - Static: Good  Standing - Dynamic: Good  Transfer Training  Transfer Training: Yes  Overall Level of Assistance: Stand-by assistance (pt demo'd shakiness/tremoring as standing progressed, pt stated \"that happens sometimes\", writer had pt sit before mob back to recliner)  Sit to Stand: Stand-by assistance  Stand to Sit: Stand-by assistance  Gait  Overall Level of Assistance: Stand-by assistance (mob with RW in/out of bathroom, no LOB)  Assistive Device: Walker, rolling     ADL  Grooming: Independent  Grooming Skilled Clinical Factors: seated at sink for EC/WS  UE Bathing: Modified independent   LE Bathing: Modified independent   UE Dressing: Minimal assistance  LE Dressing: Minimal assistance  LE Dressing Skilled Clinical Factors: pt required assist for clothing initation over L foot           Patient Education  Education Given To: Patient  Education Provided: Role of Therapy; Energy Conservation  Education Method: Verbal    Goals  Short Term Goals  Time Frame for Short term goals: 20 visits  Short Term Goal 1: Pt and caregiver will verbalize understanding of d/c folder for improved safety for d/c to home. Short Term Goal 2: Pt will tolerate BUE and FM HEP to increase MMS to 4/5 for increased ease with ADLs.   Short Term Goal 3: Pt will demo UB/LB ADLs in seated/standing with no LOB and SBA using AE PRN. Short Term Goal 4: Pt will demo dynamic standing act x7 mins without LOB and CGA.   Patient Goals   Patient goals : to return home       Therapy Time   Individual Concurrent Group Co-treatment   Time In 0720         Time Out 0758         Minutes [address] [place] [address]

## 2022-04-30 NOTE — PROGRESS NOTES
[facility] -Progress Note    SUBJECTIVE:    Patient seen for f/u of Community acquired bacterial pneumonia. She sitting up in chair with harsh cough. Afebrile. On 2L O2     ROS:   Constitutional: negative  for fevers, and negative for chills. Respiratory: positive for shortness of breath, positive for cough, and negative for wheezing  Cardiovascular: negative for chest pain, and negative for palpitations  Gastrointestinal: negative for abdominal pain, negative for nausea,negative for vomiting, negative for diarrhea, and negative for constipation     All other systems were reviewed with the patient and are negative unless otherwise stated in HPI      OBJECTIVE:        VITAL SIGNS:  Patient Vitals for the past 8 hrs:   BP Temp Temp src Pulse Resp SpO2 Weight   22 0643 122/60 97.3 °F (36.3 °C) Temporal 101 18 98 % --   22 0544 -- -- -- -- 20 94 % --   22 0311 118/66 97.6 °F (36.4 °C) Temporal 58 16 95 % 159 lb 9.6 oz (72.4 kg)         Temp: 97.3 °F (36.3 °C)  Temp range:    Temp  Av.8 °F (36.6 °C)  Min: 97.3 °F (36.3 °C)  Max: 98.1 °F (36.7 °C)    BP: 122/60  BP Range:      Systolic (41CIM), AWM:328 , Min:101 , KXH:803      Diastolic (29IEF), QLT:75, Min:56, Max:66    Pulse: 101  Pulse Range:    Pulse  Av  Min: 58  Max: 101    Resp: 18  Resp Range:   Resp  Av.7  Min: 16  Max: 20    SpO2: 98 % on supplemental O2  SpO2 range:   SpO2  Av.1 %  Min: 88 %  Max: 98 %      PaO2/FiO2 RATIO:  No results for input(s): POCPO2 in the last 72 hours. Exam:    GEN:    Awake, alert and oriented x3. EYES:  EOMI, pupils equal   NECK: Supple. No lymphadenopathy. No carotid bruit  CVS:    regular rate and rhythm, no audible murmur  PULM:  diminished with coarse rhonchi, exp wheeze no acute respiratory distress  ABD:    Bowels sounds normal.  Abdomen is soft. No distention. no tenderness to palpation. EXT:   no edema bilaterally . No calf tenderness. NEURO: Moves all extremities. Motor and sensory are grossly intact  SKIN:  No rashes. No skin lesions. Diagnostic Data:      Complete Blood Count:   Recent Labs     04/28/22  1055 04/29/22  0607 04/30/22  0610   WBC 14.7* 10.6 8.6   RBC 4.42 3.88* 4.35   HGB 13.4 11.6* 13.1   HCT 42.4 37.8 42.9   MCV 95.9 97.4 98.6   MCH 30.3 29.9 30.1   MCHC 31.6 30.7 30.5   RDW 13.7 14.1 13.9    169 204   MPV 10.1 10.0 10.0        Last 3 Blood Glucose:   Recent Labs     04/28/22  1055 04/29/22  0607 04/30/22  0610   GLUCOSE 112* 92 145*        Comprehensive Metabolic Profile:   Recent Labs     04/28/22  1055 04/29/22  0607 04/30/22  0610    142 140   K 3.8 3.9 3.9    103 101   CO2 30 31 27   BUN 7* 9 11   CREATININE 0.68 0.65 0.53   GLUCOSE 112* 92 145*   CALCIUM 9.7 9.5 10.2   PROT 6.7  --   --    LABALBU 4.0  --   --    BILITOT 0.58  --   --    ALKPHOS 70  --   --    AST 18  --   --    ALT 11  --   --         Urinalysis:   Lab Results   Component Value Date    NITRU NEGATIVE 04/29/2022    COLORU Yellow 04/29/2022    PHUR 8.5 04/29/2022    WBCUA 2 TO 5 04/29/2022    RBCUA 0 TO 2 04/29/2022    MUCUS NOT REPORTED 10/14/2021    TRICHOMONAS NOT REPORTED 10/14/2021    YEAST NOT REPORTED 10/14/2021    BACTERIA NOT REPORTED 10/14/2021    SPECGRAV 1.015 04/29/2022    LEUKOCYTESUR NEGATIVE 04/29/2022    UROBILINOGEN Normal 04/29/2022    BILIRUBINUR NEGATIVE 04/29/2022    GLUCOSEU NEGATIVE 04/29/2022    KETUA NEGATIVE 04/29/2022    AMORPHOUS NOT REPORTED 10/14/2021       HgBA1c:  No results found for: LABA1C    Lactic Acid:   Lab Results   Component Value Date    LACTA 1.9 04/28/2022    LACTA 2.0 02/06/2022    LACTA 2.5 02/05/2022        Troponin: No results for input(s): TROPONINI in the last 72 hours. CRP:  No results for input(s): CRP in the last 72 hours. Radiology/Imaging:  CT CHEST WO CONTRAST   Final Result   Moderate multifocal ground-glass opacities consistent with patient's history   of COVID-19, somewhat improved.   Other incidental findings as above. XR CHEST PORTABLE   Final Result   Unchanged extensive bilateral interstitial opacities could represent scarring   or fibrosis the patient's known COVID pneumonia               ASSESSMENT / PLAN:  Community acquired bacterial pneumonia  · Continue current therapy   § No bronchoscopy  § Current CT is improved from February 2022  § Likely colonized with bacteria  § Likely will be recurrent due to fibrosis from COVID-19 infection and bronchiectasis  § Plan to follow-up as an outpatient in 1 month with a repeat CT scan prior to that visit  ? IV Rocephin and Zithromax  ? Nebs  ? IV Solu-Medrol  ? Out of bed with meals  ? Start Chest Vest  · Acute on chronic respiratory failure with hypoxia and hypercapnia  ? Acapella  ? Nebs  ? Baseline supplemental oxygen 2 L  · COPD  ? Continue Stiolto, nebs  ?  IV Solu-Medrol  · Nutrition status:   · Well developed, well nourished with no malnutrition  · Dietician consult initiated  · Hospital Prophylaxis:   · DVT: Xarelto   · Stress Ulcer: PPI   · High risk medications: none   · Disposition:    · Discharge plan is home      RYAN Bhagat CNP , RYAN, NP-C  Hospitalist Medicine        4/30/2022, 7:56 AM

## 2022-04-30 NOTE — PLAN OF CARE
Problem: Discharge Planning  Goal: Discharge to home or other facility with appropriate resources  Outcome: Progressing  Flowsheets (Taken 4/30/2022 1150)  Discharge to home or other facility with appropriate resources:   Identify barriers to discharge with patient and caregiver   Identify discharge learning needs (meds, wound care, etc)     Problem: Pain  Goal: Verbalizes/displays adequate comfort level or baseline comfort level  Outcome: Progressing  Flowsheets (Taken 4/30/2022 1150)  Verbalizes/displays adequate comfort level or baseline comfort level:   Encourage patient to monitor pain and request assistance   Administer analgesics based on type and severity of pain and evaluate response   Assess pain using appropriate pain scale   Implement non-pharmacological measures as appropriate and evaluate response     Problem: Safety - Adult  Goal: Free from fall injury  Outcome: Progressing  Flowsheets (Taken 4/30/2022 1150)  Free From Fall Injury: Instruct family/caregiver on patient safety     Problem: Chronic Conditions and Co-morbidities  Goal: Patient's chronic conditions and co-morbidity symptoms are monitored and maintained or improved  Outcome: Progressing  Flowsheets (Taken 4/30/2022 1150)  Care Plan - Patient's Chronic Conditions and Co-Morbidity Symptoms are Monitored and Maintained or Improved: Monitor and assess patient's chronic conditions and comorbid symptoms for stability, deterioration, or improvement

## 2022-04-30 NOTE — RT PROTOCOL NOTE
RESPIRATORY ASSESSMENT PROTOCOL                                                                                              Patient Name: Benji Cortés Room#: 2538/[id number] : 1945     Admitting diagnosis: Community acquired bacterial pneumonia [J15.9]  Multifocal pneumonia [J18.9]       Medical History:   Past Medical History:   Diagnosis Date    Bronchiectasis with acute exacerbation ([facility] 2022    Chronic pain syndrome     dilaudid infusion pump    COPD [facility]chronic obstructive [facility] disease) [facility]     Depression     GERD (gastroesophageal reflux disease)     Hypothyroidism     Osteoporosis     Pulmonary fibrosis ([facility] 2022       PATIENT ASSESSMENT    LABORATORY DATA  Hematology:   Lab Results   Component Value Date    WBC 8.6 2022    RBC 4.35 2022    HGB 13.1 2022    HCT 42.9 2022     2022     Chemistry:    Lab Results   Component Value Date    PHART 7.398 2022    BZM2POM 54.3 2022    PO2ART 75.9 2022    B5AZKNVQ 95.0 2022    FAI5TIT 32.7 2022    PBEA 6.2 2022       VITALS  Pulse: 101   Resp: 18  BP: 122/60  SpO2: 95 % O2 Device: Nasal cannula  Temp: 97.3 °F (36.3 °C)    SKIN COLOR  [x] Normal  [] Pale  [] Dusky  [] Cyanotic    RESPIRATORY PATTERN  [x] Normal  [] Dyspnea  [] Cheyne-Granger  [] Kussmaul  [] Biots    AMBULATORY  [] Yes  [] No  [x] With Assistance      Patient Acuity 0 1 2 3 4 Score   Level of Concious (LOC) [x]  Alert & Oriented or Pt normal LOC []  Confused;follows directions []  Confused & uncooper-ative []  Obtunded []  Comatose 0   Respiratory Rate  (RR) [x]  Reg. rate & pattern. 12 - 20 bpm  []  Increased RR.  Greater than 20 bpm   []  SOB w/ exertion or RR greater than 24 bpm []  Access- ory muscle use at rest. Abn.  resp. []  SOB at rest.   0   Bilateral Breath Sounds (BBS) []  Clear []  Diminish-ed bases  []  Diminish-ed t/o, or rales   [x]  Sporadic, scattered wheezes or rhonchi []  Persistentwheezes and, or absent BBS 3   Cough []  Strong, effective, & non-prod. []  Effective & prod. Less than 25 ml (2 TBSP) over past 24 hrs [x]  Ineffective & non-prod to less than 25 ML over past 24 hrs []  Ineffective and, or greater than 25 ml sputum prod. past 24 hrs. []  Nonspon- taneous; Requires suctioning 2   Pulmonary History  (PULM HX) []  No smoking and no chronic pulmonaryhistory []  Former smoker. Quit over 12 mos. ago []  Current smoker or quit w/ in 12 mos []  Pulm. History and, or 20 pk/yr smoking hx [x]  Admitted w/ acute pulm. dx and, or has been admitted w/ pulm. dx 2 or more times over past 12 mos 4   Surgical History this Admit  (SURG HX) [x]  No surgery []  General surgery []  Lower abdominal []  Thoracic or upper abdominal   []  Thoracic w/ pulm. disease 0   Chest X-Ray (CXR)/CT Scan []  Clear or not applicable []  Not available []  Atelect- asis or pleural effusions []  Localized infiltrate or pulm. edema [x]  Con-solidated Infiltrates, bilateral, or in more than 1 lobe 4   Slow or Forced VC, FEV1 OR PEFR (PULM FXN)  [x]  80% or greater, or not indicated []  Pt. unable to perform []  FEV1 or PEFR or VC 51-79%. []  FEV1 or PEFR or VC  30-49%   []  FEV1 or PEFR or VC less than 30%   0   TOTAL ACUITY: 13       CARE PLAN    If Acuity Level is 2, 3, or 4 in any of the following:    [] BILATERAL BREATH SOUNDS (BBS)     [x] PULMONARY HISTORY (PULM HX)  [] PULMONARY FUNCTION (PULM FX)    Goal: Improve respiratory functions in patients with airway disease and decrease WOB    [x] AEROSOL PROTOCOL    Total Acuity:   16-32  []  Secondary Assessment in 24 hrs Total Acuity:  9-15  [x]  Secondary Assessment in 24 hrs Total Acuity:  4-8  []  Secondary Assessment in 48 hrs Total Acuity:  0-3  []  Secondary Assessment in 72 hrs   HHN AEROSOL THERAPY with  [physician-ordered bronchodilator(s)] q 4 & Albuterol PRN q2 hrs. Breath-Actuated Neb if BBS Acuity = 4, and pt. can use MP.  Notify physician if condition deteriorates. HHN AEROSOL THERAPY with  [physician-ordered bronchodilator(s)]  QID and Albuterol PRN q4 hrs. Breath-Actuated Neb if BBS Acuity = 4, and pt. can use MP. Notify physician if condition deteriorates. MDI THERAPY with  2 actuations of [physician-ordered bronchodilator(s)] via spacer TID Albuterol and PRNq4 hrs. If unable to utilize MDI: HHN [physician-ordered bronchodilator(s)] TID and Albuterol PRN q4 hrs. Notify physician if condition deteriorates. MDI THERAPY with  [physician-ordered bronchodilator(s)] via spacer TID PRN. If unable to utilize MDI: HHN [physician-ordered bronchodilator(s)] TID PRN. Notify physician if condition deteriorates. If Acuity Level is 2, 3, or 4 in any of the following:    [] COUGH     [] SURGICAL HISTORY (SURG HX)  [x] CHEST XRAY (CXR)    Goal: Improvement in sputum mobilization in patients with ineffective airway clearance. Reverse atelectasis. [x] Bronchopulmonary Hygiene Protocol    Total Acuity:   16-32  []  Secondary Assessment in 24 hrs Total Acuity:  9-15  [x]  Secondary Assessment in 24 hrs Total Acuity:  4-8  []  Secondary Assessment in 48 hrs Total Acuity:  0-3  []  Secondary Assessment in 72 hrs   METANEB QID with [physician-ordered bronchodilator(s)] if CXR Acuity = 4; otherwise:  PD&P, PEP, or Vest QID & PRN  NT Sxn PRN for ineffective cough  METANEB QID with [physician-ordered bronchodilator(s)] if CXR Acuity = 4; otherwise:  PD&P, PEP, or Vest TID & PRN  NT Sxn PRN for ineffective cough  Instruct patient to self-perform IS q1hr WA   Directed Cough self-performed q1hr WA     If Acuity Level is 2 or above in the following:    [] PULMONARY HISTORY (PULM HX)    Goal: Assist patient in quitting smoking to slow or stop the progression of lung disease.     [] Smoking Cessation Protocol    SMOKING CESSATION EDUCATION provided according to policy [id number]: (marlyn with an X)  ____Yes    ____ No     ____ NA    Smoking Cessation Booklet given:  ____Yes  ____No ____Patient Brook Jansen

## 2022-04-30 NOTE — PROGRESS NOTES
Patient sitting up in the chair at this time. Vital signs and assessment completed. When writer came in the room patient did have her O2 out of her nose. Writer made mention to put back into nares. Patient sating at 92% on 2L nasal cannula. Patient denies any other needs at this time. Call light, bedside table and personal belongings within reach.

## 2022-04-30 NOTE — PROGRESS NOTES
Patient woke up for vitals/assessment. Second assessment completed at this time. Assessment unchanged from previous shift assessment. Vital signs completed. Patient denies needing to get up and go to the bathroom at this time. Patient denies any needs. Call light, bedside table and personal belongings within reach.

## 2022-04-30 NOTE — PROGRESS NOTES
Physical Therapy  Facility/Department: [facility] AT THE [facility] MED SURG  Daily Treatment Note  NAME: Adryan Aguilera  : 1945  MRN: [id number]    Date of Service: 2022    Discharge Recommendations:  Continue to assess pending progress,Patient would benefit from continued therapy after discharge        Patient Diagnosis(es): The encounter diagnosis was Multifocal pneumonia. Assessment   Assessment: Pt sitting in chair upon exit with call button within reach and no alarm upon entry. Activity Tolerance: Patient tolerated evaluation without incident     Plan    Plan  Plan: 2 times a day 7 days a week (Daily on weekends.)  Current Treatment Recommendations: Strengthening;Balance training;Functional mobility training;Transfer training; Endurance training;Gait training;Stair training;Neuromuscular re-education;Home exercise program;Safety education & training     Subjective    Subjective  Subjective: Pt pleasant and agreeable for treatment. Pain: Pt reports low back and LLE pain 7/10. States it starts from center of her low back and travels all the way down to her toes.   Orientation  Overall Orientation Status: Within Normal Limits     Objective   Vitals     Bed Mobility Training  Bed Mobility Training:  (Pt in recliner upon arrival.)  Balance  Sitting - Static: Good (unsupported)  Sitting - Dynamic: Good (unsupported)  Standing - Static: Good  Standing - Dynamic: Good  Transfer Training  Transfer Training: Yes  Interventions: Safety awareness training;Verbal cues  Sit to Stand: Stand-by assistance;Contact-guard assistance  Stand to Sit: Stand-by assistance;Contact-guard assistance  Gait Training  Gait Training: Yes  Gait  Overall Level of Assistance: Contact-guard assistance;Minimum assistance  Base of Support: Widened;Shift to right  Gait Abnormalities: Antalgic  Distance (ft): 20 Feet  Assistive Device: Walker, rolling     PT Exercises  Exercise Treatment: Seated ther ex x 20 of: heel/toe raises, marching, hip abd, and Mary.        Patient Education  Education Given To: Patient  Education Provided: Plan of Care;Role of Therapy;Transfer Training (Also educated on safety with amb.)  Education Method: Verbal;Demonstration  Barriers to Learning: None  Education Outcome: Verbalized understanding    Goals  Short Term Goals  Time Frame for Short term goals: 20 days  Short term goal 1: Initiate HEP and progress as tolerated for improved tolerance to ADLs  Short term goal 2: Pt will be independent with bed mobility in order to discharge home with spouse. Short term goal 3: Pt will transfer with modified independence with least restrictive device and good safety to reduce fall risk. Short term goal 4: Pt will ambulate up to 100 feet in order to exit and enter her home safely for appointments. Short term goal 5: Pt will negotiate 1 to 2 steps with rails for negotiation of step to enter home safely.   Patient Goals   Patient goals : return home      Therapy Time   Individual Concurrent Group Co-treatment   Time In 5946         Time Out [id number] [place], [place]

## 2022-05-01 LAB
ABSOLUTE EOS #: 0.1 K/UL (ref 0–0.44)
ABSOLUTE IMMATURE GRANULOCYTE: 0.1 K/UL (ref 0–0.3)
ABSOLUTE LYMPH #: 0.29 K/UL (ref 1.1–3.7)
ABSOLUTE MONO #: 0.29 K/UL (ref 0.1–1.2)
ANION GAP SERPL CALCULATED.3IONS-SCNC: 8 MMOL/L (ref 9–17)
BASOPHILS # BLD: 0 % (ref 0–2)
BASOPHILS ABSOLUTE: 0 K/UL (ref 0–0.2)
BUN BLDV-MCNC: 15 MG/DL (ref 8–23)
BUN/CREAT BLD: 28 (ref 9–20)
CALCIUM SERPL-MCNC: 9.7 MG/DL (ref 8.6–10.4)
CHLORIDE BLD-SCNC: 102 MMOL/L (ref 98–107)
CO2: 31 MMOL/L (ref 20–31)
CREAT SERPL-MCNC: 0.53 MG/DL (ref 0.5–0.9)
EOSINOPHILS RELATIVE PERCENT: 1 % (ref 1–4)
GFR AFRICAN AMERICAN: >60 ML/MIN
GFR NON-AFRICAN AMERICAN: >60 ML/MIN
GFR SERPL CREATININE-BSD FRML MDRD: ABNORMAL ML/MIN/{1.73_M2}
GFR SERPL CREATININE-BSD FRML MDRD: ABNORMAL ML/MIN/{1.73_M2}
GLUCOSE BLD-MCNC: 145 MG/DL (ref 70–99)
HCT VFR BLD CALC: 37.6 % (ref 36.3–47.1)
HEMOGLOBIN: 11.7 G/DL (ref 11.9–15.1)
IMMATURE GRANULOCYTES: 1 %
LYMPHOCYTES # BLD: 3 % (ref 24–43)
MCH RBC QN AUTO: 30.2 PG (ref 25.2–33.5)
MCHC RBC AUTO-ENTMCNC: 31.1 G/DL (ref 28.4–34.8)
MCV RBC AUTO: 97.2 FL (ref 82.6–102.9)
MONOCYTES # BLD: 3 % (ref 3–12)
MORPHOLOGY: NORMAL
NRBC AUTOMATED: 0 PER 100 WBC
PDW BLD-RTO: 14.3 % (ref 11.8–14.4)
PLATELET # BLD: 199 K/UL (ref 138–453)
PMV BLD AUTO: 10 FL (ref 8.1–13.5)
POTASSIUM SERPL-SCNC: 4.3 MMOL/L (ref 3.7–5.3)
RBC # BLD: 3.87 M/UL (ref 3.95–5.11)
SEG NEUTROPHILS: 92 % (ref 36–65)
SEGMENTED NEUTROPHILS ABSOLUTE COUNT: 8.72 K/UL (ref 1.5–8.1)
SODIUM BLD-SCNC: 141 MMOL/L (ref 135–144)
WBC # BLD: 9.5 K/UL (ref 3.5–11.3)

## 2022-05-01 PROCEDURE — 97110 THERAPEUTIC EXERCISES: CPT

## 2022-05-01 PROCEDURE — 94669 MECHANICAL CHEST WALL OSCILL: CPT

## 2022-05-01 PROCEDURE — 94761 N-INVAS EAR/PLS OXIMETRY MLT: CPT

## 2022-05-01 PROCEDURE — 6360000002 HC RX W HCPCS: Performed by: INTERNAL MEDICINE

## 2022-05-01 PROCEDURE — 1200000000 HC SEMI PRIVATE

## 2022-05-01 PROCEDURE — 85025 COMPLETE CBC W/AUTO DIFF WBC: CPT

## 2022-05-01 PROCEDURE — 6370000000 HC RX 637 (ALT 250 FOR IP): Performed by: INTERNAL MEDICINE

## 2022-05-01 PROCEDURE — 94640 AIRWAY INHALATION TREATMENT: CPT

## 2022-05-01 PROCEDURE — 97535 SELF CARE MNGMENT TRAINING: CPT

## 2022-05-01 PROCEDURE — 2580000003 HC RX 258: Performed by: INTERNAL MEDICINE

## 2022-05-01 PROCEDURE — 80048 BASIC METABOLIC PNL TOTAL CA: CPT

## 2022-05-01 PROCEDURE — 97116 GAIT TRAINING THERAPY: CPT

## 2022-05-01 PROCEDURE — 36415 COLL VENOUS BLD VENIPUNCTURE: CPT

## 2022-05-01 PROCEDURE — 2700000000 HC OXYGEN THERAPY PER DAY

## 2022-05-01 PROCEDURE — 94664 DEMO&/EVAL PT USE INHALER: CPT

## 2022-05-01 PROCEDURE — 6360000002 HC RX W HCPCS: Performed by: NURSE PRACTITIONER

## 2022-05-01 RX ADMIN — RIVAROXABAN 20 MG: 20 TABLET, FILM COATED ORAL at 17:38

## 2022-05-01 RX ADMIN — PANTOPRAZOLE SODIUM 40 MG: 40 TABLET, DELAYED RELEASE ORAL at 07:08

## 2022-05-01 RX ADMIN — POTASSIUM CHLORIDE 20 MEQ: 1500 TABLET, EXTENDED RELEASE ORAL at 20:27

## 2022-05-01 RX ADMIN — GUAIFENESIN AND DEXTROMETHORPHAN 5 ML: 100; 10 SYRUP ORAL at 17:38

## 2022-05-01 RX ADMIN — METHYLPREDNISOLONE SODIUM SUCCINATE 60 MG: 125 INJECTION, POWDER, FOR SOLUTION INTRAMUSCULAR; INTRAVENOUS at 20:27

## 2022-05-01 RX ADMIN — GUAIFENESIN AND DEXTROMETHORPHAN 5 ML: 100; 10 SYRUP ORAL at 05:02

## 2022-05-01 RX ADMIN — IPRATROPIUM BROMIDE AND ALBUTEROL SULFATE 3 ML: 2.5; .5 SOLUTION RESPIRATORY (INHALATION) at 05:16

## 2022-05-01 RX ADMIN — AZITHROMYCIN MONOHYDRATE 500 MG: 250 TABLET ORAL at 11:33

## 2022-05-01 RX ADMIN — GUAIFENESIN AND DEXTROMETHORPHAN 5 ML: 100; 10 SYRUP ORAL at 20:25

## 2022-05-01 RX ADMIN — PREGABALIN 300 MG: 75 CAPSULE ORAL at 09:24

## 2022-05-01 RX ADMIN — METHYLPREDNISOLONE SODIUM SUCCINATE 60 MG: 125 INJECTION, POWDER, FOR SOLUTION INTRAMUSCULAR; INTRAVENOUS at 09:24

## 2022-05-01 RX ADMIN — HYDROCODONE BITARTRATE AND ACETAMINOPHEN 1 TABLET: 10; 325 TABLET ORAL at 18:38

## 2022-05-01 RX ADMIN — ASPIRIN 81 MG: 81 TABLET, CHEWABLE ORAL at 09:24

## 2022-05-01 RX ADMIN — HYDROCODONE BITARTRATE AND ACETAMINOPHEN 1 TABLET: 10; 325 TABLET ORAL at 05:03

## 2022-05-01 RX ADMIN — CEFTRIAXONE 1000 MG: 1 INJECTION, POWDER, FOR SOLUTION INTRAMUSCULAR; INTRAVENOUS at 11:35

## 2022-05-01 RX ADMIN — LEVOTHYROXINE SODIUM 150 MCG: 150 TABLET ORAL at 07:08

## 2022-05-01 RX ADMIN — HYDROXYCHLOROQUINE SULFATE 300 MG: 200 TABLET ORAL at 09:24

## 2022-05-01 RX ADMIN — GUAIFENESIN AND DEXTROMETHORPHAN 5 ML: 100; 10 SYRUP ORAL at 14:56

## 2022-05-01 RX ADMIN — PREGABALIN 300 MG: 75 CAPSULE ORAL at 20:24

## 2022-05-01 RX ADMIN — GUAIFENESIN AND DEXTROMETHORPHAN 5 ML: 100; 10 SYRUP ORAL at 10:21

## 2022-05-01 RX ADMIN — IPRATROPIUM BROMIDE AND ALBUTEROL SULFATE 3 ML: 2.5; .5 SOLUTION RESPIRATORY (INHALATION) at 20:48

## 2022-05-01 RX ADMIN — SODIUM CHLORIDE, PRESERVATIVE FREE 10 ML: 5 INJECTION INTRAVENOUS at 09:25

## 2022-05-01 RX ADMIN — POTASSIUM CHLORIDE 20 MEQ: 1500 TABLET, EXTENDED RELEASE ORAL at 09:24

## 2022-05-01 RX ADMIN — IPRATROPIUM BROMIDE AND ALBUTEROL SULFATE 3 ML: 2.5; .5 SOLUTION RESPIRATORY (INHALATION) at 14:37

## 2022-05-01 RX ADMIN — SODIUM CHLORIDE, PRESERVATIVE FREE 10 ML: 5 INJECTION INTRAVENOUS at 20:31

## 2022-05-01 RX ADMIN — FUROSEMIDE 20 MG: 20 TABLET ORAL at 09:24

## 2022-05-01 RX ADMIN — DULOXETINE 60 MG: 60 CAPSULE, DELAYED RELEASE ORAL at 09:24

## 2022-05-01 RX ADMIN — TRAZODONE HYDROCHLORIDE 200 MG: 50 TABLET ORAL at 20:24

## 2022-05-01 ASSESSMENT — PAIN DESCRIPTION - DIRECTION
RADIATING_TOWARDS: DOWN LEFT LEG
RADIATING_TOWARDS: DOWN LEG
RADIATING_TOWARDS: DOWN LEFT LEG

## 2022-05-01 ASSESSMENT — PAIN DESCRIPTION - ONSET
ONSET: ON-GOING

## 2022-05-01 ASSESSMENT — PAIN SCALES - GENERAL
PAINLEVEL_OUTOF10: 7
PAINLEVEL_OUTOF10: 8

## 2022-05-01 ASSESSMENT — PAIN - FUNCTIONAL ASSESSMENT
PAIN_FUNCTIONAL_ASSESSMENT: ACTIVITIES ARE NOT PREVENTED

## 2022-05-01 ASSESSMENT — PAIN DESCRIPTION - LOCATION
LOCATION: BACK

## 2022-05-01 ASSESSMENT — PAIN DESCRIPTION - FREQUENCY
FREQUENCY: CONTINUOUS

## 2022-05-01 ASSESSMENT — PAIN DESCRIPTION - PAIN TYPE
TYPE: CHRONIC PAIN

## 2022-05-01 ASSESSMENT — PAIN DESCRIPTION - ORIENTATION
ORIENTATION: LOWER

## 2022-05-01 ASSESSMENT — PAIN DESCRIPTION - DESCRIPTORS
DESCRIPTORS: ACHING

## 2022-05-01 NOTE — PROGRESS NOTES
Patient shift assessment and vitals completed at this time. Patient is sitting in her chair watching television. Her respirations are even and unlabored while on 2 L/min via nasal canula. Patients vitals are within normal limits. Patients lungs has expiratory wheezes throughout. Patient given Norco  at this time for lower back pain radiating to her feet. Patient has no questions at this time, call light within reach, will continue to monitor.

## 2022-05-01 NOTE — PROGRESS NOTES
Medications given. Patient had just finished chest vest treatment. Patient requested to remain up in chair at this time and also requested to hold off on trazodone til ready to go to bed toward 2200. Patient to call out when ready for bed. Patient denies any other needs at this time. Call light, bedside table and personal belongings within reach. Call light, bedside table and personal belongings within reach.

## 2022-05-01 NOTE — PROGRESS NOTES
Physical Therapy  Facility/Department: [facility] AT THE [facility] MED SURG  Daily Treatment Note  NAME: Karissa Blake  : 1945  MRN: [id number]    Date of Service: 2022    Discharge Recommendations:  Continue to assess pending progress,Patient would benefit from continued therapy after discharge        Patient Diagnosis(es): The encounter diagnosis was Multifocal pneumonia. Assessment   Assessment: Pt sitting in chair upon exit with call button within reach and no alarm upon entry. Activity Tolerance: Patient tolerated treatment well     Plan    Plan  Plan: 2 times a day 7 days a week (Daily on weekends.)  Current Treatment Recommendations: Strengthening;Balance training;Functional mobility training;Transfer training; Endurance training;Gait training;Stair training;Neuromuscular re-education;Home exercise program;Safety education & training     Subjective    Subjective  Subjective: Pt pleasant and agreeable for treatment. Stated that she is feeling better but pain is about the same. Pain: Pt reports low back and LLE pain /10. States it starts from center of her low back and travels all the way down to her toes. Orientation  Overall Orientation Status: Within Normal Limits     Objective   Vitals     Bed Mobility Training  Bed Mobility Training:  (Pt sitting up in chair upon arrival.)  Transfer Training  Transfer Training: Yes  Overall Level of Assistance: Stand-by assistance  Interventions: Safety awareness training;Verbal cues  Sit to Stand: Stand-by assistance;Contact-guard assistance  Stand to Sit: Stand-by assistance  Toilet Transfer: Stand-by assistance  Gait Training  Gait Training: Yes  Gait  Overall Level of Assistance: Stand-by assistance  Base of Support: Widened;Shift to right  Speed/Helen: Slow  Gait Abnormalities: Antalgic  Distance (ft): 25 Feet  Assistive Device: Walker, rolling     PT Exercises  Exercise Treatment: Seated ther ex x 20 of: heel/toe raises, marching, hip abd, and LAQ.         Patient Education  Education Given To: Patient  Education Provided: Plan of Care;Role of Therapy;Transfer Training (Educated pt on safety with amb.)  Education Method: Verbal;Demonstration  Barriers to Learning: None  Education Outcome: Verbalized understanding    Goals  Short Term Goals  Time Frame for Short term goals: 20 days  Short term goal 1: Initiate HEP and progress as tolerated for improved tolerance to ADLs  Short term goal 2: Pt will be independent with bed mobility in order to discharge home with spouse. Short term goal 3: Pt will transfer with modified independence with least restrictive device and good safety to reduce fall risk. Short term goal 4: Pt will ambulate up to 100 feet in order to exit and enter her home safely for appointments. Short term goal 5: Pt will negotiate 1 to 2 steps with rails for negotiation of step to enter home safely.   Patient Goals   Patient goals : return home      Therapy Time   Individual Concurrent Group Co-treatment   Time In 4071         Time Out 1522         Minutes [address] [place] [address]

## 2022-05-01 NOTE — PROGRESS NOTES
Patient ready to go to bed at this time. Trazodone and Robitussin given. Patient up to the bathroom and into bed at this time. Patient assisted to get comfortable. Water pitcher refilled. Patient denies any other needs at this time. Call light, bedside table and personal belongings within reach.

## 2022-05-01 NOTE — PROGRESS NOTES
[facility] -Progress Note    SUBJECTIVE:    Patient seen for f/u of Community acquired bacterial pneumonia. She sitting up in chair in no distress. Tolerating Chest vest well. She stated the vest does help her. She stated that her phlegm is yellow. Afebrile. On 2L O2     ROS:   Constitutional: negative  for fevers, and negative for chills. Respiratory: positive for shortness of breath, positive for cough, and negative for wheezing  Cardiovascular: negative for chest pain, and negative for palpitations  Gastrointestinal: negative for abdominal pain, negative for nausea,negative for vomiting, negative for diarrhea, and negative for constipation     All other systems were reviewed with the patient and are negative unless otherwise stated in HPI      OBJECTIVE:        VITAL SIGNS:  Patient Vitals for the past 8 hrs:   BP Temp Temp src Pulse Resp SpO2 Weight   22 0659 (!) 129/59 97.2 °F (36.2 °C) Temporal 79 18 100 % --   22 0518 -- -- -- -- -- 97 % --   22 0129 113/66 97.8 °F (36.6 °C) Temporal 64 18 -- 158 lb 9.6 oz (71.9 kg)         Temp: 97.2 °F (36.2 °C)  Temp range:    Temp  Av.6 °F (36.4 °C)  Min: 97.2 °F (36.2 °C)  Max: 97.8 °F (36.6 °C)    BP: (!) 129/59  BP Range:      Systolic (52JWH), TIS:088 , Min:113 , MVC:514      Diastolic (26QFA), RAR:72, Min:54, Max:66    Pulse: 79  Pulse Range:    Pulse  Av.3  Min: 64  Max: 104    Resp: 18  Resp Range:   Resp  Av  Min: 18  Max: 18    SpO2: 100 % on supplemental O2  SpO2 range:   SpO2  Av.8 %  Min: 92 %  Max: 100 %      PaO2/FiO2 RATIO:  No results for input(s): POCPO2 in the last 72 hours. Exam:    GEN:    Awake, alert and oriented x3. EYES:  EOMI, pupils equal   NECK: Supple. No lymphadenopathy. No carotid bruit  CVS:    regular rate and rhythm, no audible murmur  PULM:  diminished with coarse rhonchi, scattered rales no acute respiratory distress  ABD:    Bowels sounds normal.  Abdomen is soft. No distention.   no tenderness to palpation. EXT:   trace edema bilaterally . No calf tenderness. NEURO: Moves all extremities. Motor and sensory are grossly intact  SKIN:  No rashes. No skin lesions. Diagnostic Data:      Complete Blood Count:   Recent Labs     04/29/22  0607 04/30/22  0610 05/01/22  0625   WBC 10.6 8.6 9.5   RBC 3.88* 4.35 3.87*   HGB 11.6* 13.1 11.7*   HCT 37.8 42.9 37.6   MCV 97.4 98.6 97.2   MCH 29.9 30.1 30.2   MCHC 30.7 30.5 31.1   RDW 14.1 13.9 14.3    204 199   MPV 10.0 10.0 10.0        Last 3 Blood Glucose:   Recent Labs     04/28/22  1055 04/29/22  0607 04/30/22  0610 05/01/22  0625   GLUCOSE 112* 92 145* 145*        Comprehensive Metabolic Profile:   Recent Labs     04/28/22  1055 04/28/22  1055 04/29/22  0607 04/30/22  0610 05/01/22  0625      < > 142 140 141   K 3.8   < > 3.9 3.9 4.3      < > 103 101 102   CO2 30   < > 31 27 31   BUN 7*   < > 9 11 15   CREATININE 0.68   < > 0.65 0.53 0.53   GLUCOSE 112*   < > 92 145* 145*   CALCIUM 9.7   < > 9.5 10.2 9.7   PROT 6.7  --   --   --   --    LABALBU 4.0  --   --   --   --    BILITOT 0.58  --   --   --   --    ALKPHOS 70  --   --   --   --    AST 18  --   --   --   --    ALT 11  --   --   --   --     < > = values in this interval not displayed.         Urinalysis:   Lab Results   Component Value Date    NITRU NEGATIVE 04/29/2022    COLORU Yellow 04/29/2022    PHUR 8.5 04/29/2022    WBCUA 2 TO 5 04/29/2022    RBCUA 0 TO 2 04/29/2022    MUCUS NOT REPORTED 10/14/2021    TRICHOMONAS NOT REPORTED 10/14/2021    YEAST NOT REPORTED 10/14/2021    BACTERIA NOT REPORTED 10/14/2021    SPECGRAV 1.015 04/29/2022    LEUKOCYTESUR NEGATIVE 04/29/2022    UROBILINOGEN Normal 04/29/2022    BILIRUBINUR NEGATIVE 04/29/2022    GLUCOSEU NEGATIVE 04/29/2022    KETUA NEGATIVE 04/29/2022    AMORPHOUS NOT REPORTED 10/14/2021       HgBA1c:  No results found for: LABA1C    Lactic Acid:   Lab Results   Component Value Date    LACTA 1.9 04/28/2022    LACTA 2.0 02/06/2022    LACTA 2.5 02/05/2022        Troponin: No results for input(s): TROPONINI in the last 72 hours. CRP:  No results for input(s): CRP in the last 72 hours. Radiology/Imaging:  CT CHEST WO CONTRAST   Final Result   Moderate multifocal ground-glass opacities consistent with patient's history   of COVID-19, somewhat improved. Other incidental findings as above. XR CHEST PORTABLE   Final Result   Unchanged extensive bilateral interstitial opacities could represent scarring   or fibrosis the patient's known COVID pneumonia               ASSESSMENT / PLAN:  Community acquired bacterial pneumonia  · Continue current therapy   § No bronchoscopy  § Current CT is improved from February 2022  § Likely colonized with bacteria  § Likely will be recurrent due to fibrosis from COVID-19 infection and bronchiectasis  § Plan to follow-up as an outpatient in 1 month with a repeat CT scan prior to that visit  ? IV Rocephin and Zithromax  ? Nebs  ? IV Solu-Medrol  ? Out of bed with meals  ? Continue Chest Vest  · Acute on chronic respiratory failure with hypoxia and hypercapnia  ? Acapella  ? Nebs  ? Baseline supplemental oxygen 2 L  ? TEDS  · COPD  ? Continue Stiolto, nebs  ?  IV Solu-Medrol  · Nutrition status:   · Well developed, well nourished with no malnutrition  · Dietician consult initiated  · Hospital Prophylaxis:   · DVT: Xarelto   · Stress Ulcer: PPI   · High risk medications: none   · Disposition:    · Discharge plan is home      RYAN Isbell CNP , RYAN, NP-C  Hospitalist Medicine        5/1/2022, 8:36 AM

## 2022-05-01 NOTE — PROGRESS NOTES
Occupational Therapy  Facility/Department: [facility] AT THE [facility] MED SURG  Daily Treatment Note  NAME: Benji Cortés  : 1945  MRN: [id number]    Date of Service: 2022    Discharge Recommendations:  Continue to assess pending progress,Home with Home health OT         Patient Diagnosis(es): The encounter diagnosis was Multifocal pneumonia. Assessment    Activity Tolerance: Patient tolerated treatment well  Discharge Recommendations: Continue to assess pending progress;Home with Home health OT      Plan         Subjective   Subjective  Subjective: Pt sitting up in recliner upon arrival. Pt agreed to participate in therapy session this date. Pain: Pt reported 10 back and LLE this date. Objective    Vitals     Transfer Training  Transfer Training: Yes  Overall Level of Assistance: Stand-by assistance  Sit to Stand: Stand-by assistance  Stand to Sit: Stand-by assistance  Toilet Transfer: Stand-by assistance  Gait  Overall Level of Assistance: Stand-by assistance  Speed/Helen: Slow  Assistive Device: Walker, rolling     ADL  Toileting: Stand by assistance  OT Exercises  Exercise Treatment: Pt tolerated BUE ther ex with 1# dumbbell x 7 planes x 10-15 reps x 1 set to increase UE strength and endurance in order to ease completion of ADL tasks. pt required RBs as needed secondary to fatigue. Patient Education  Education Given To: Patient  Education Provided: Role of Therapy; Energy Conservation  Education Method: Demonstration  Barriers to Learning: None  Education Outcome: Verbalized understanding    Goals  Short Term Goals  Time Frame for Short term goals: 20 visits  Short Term Goal 1: Pt and caregiver will verbalize understanding of d/c folder for improved safety for d/c to home. Short Term Goal 2: Pt will tolerate BUE and FM HEP to increase MMS to 4/5 for increased ease with ADLs. Short Term Goal 3: Pt will demo UB/LB ADLs in seated/standing with no LOB and SBA using AE PRN.   Short Term Goal 4: Pt will demo dynamic standing act x7 mins without LOB and CGA.   Patient Goals   Patient goals : to return home       Therapy Time   Individual Concurrent Group Co-treatment   Time In 0541         Time Out 1327         Minutes 31                 EB Mcdonough/BOB

## 2022-05-01 NOTE — PLAN OF CARE
Problem: Discharge Planning  Goal: Discharge to home or other facility with appropriate resources  Outcome: Progressing  Flowsheets (Taken 5/1/2022 1048)  Discharge to home or other facility with appropriate resources:   Identify barriers to discharge with patient and caregiver   Identify discharge learning needs (meds, wound care, etc)     Problem: Pain  Goal: Verbalizes/displays adequate comfort level or baseline comfort level  Outcome: Progressing  Flowsheets (Taken 5/1/2022 1048)  Verbalizes/displays adequate comfort level or baseline comfort level:   Encourage patient to monitor pain and request assistance   Administer analgesics based on type and severity of pain and evaluate response   Assess pain using appropriate pain scale     Problem: Safety - Adult  Goal: Free from fall injury  Outcome: Progressing  Flowsheets (Taken 5/1/2022 1048)  Free From Fall Injury: Instruct family/caregiver on patient safety     Problem: Chronic Conditions and Co-morbidities  Goal: Patient's chronic conditions and co-morbidity symptoms are monitored and maintained or improved  Outcome: Progressing  Flowsheets (Taken 5/1/2022 1048)  Care Plan - Patient's Chronic Conditions and Co-Morbidity Symptoms are Monitored and Maintained or Improved: Monitor and assess patient's chronic conditions and comorbid symptoms for stability, deterioration, or improvement     Problem: ABCDS Injury Assessment  Goal: Absence of physical injury  Outcome: Progressing  Note: Call light in reach at all times, frequent checks, bed in lowest position, wheels of bed and chair locked, non skid footwear on, appropriate transfer techniques, personal items within reach, walkways free of obstructions, fall risk armband and sign displayed, Quiros fall risk score per protocol. No falls this shift, will continue to monitor.

## 2022-05-01 NOTE — PROGRESS NOTES
Patient awoke for vitals/assessment. Vital signs and assessment completed. Patient states her back pain remains about the same at 7/10. Patient denies needing to use the bathroom at this time. Patient denies any other needs at this time. Call light, bedside table and personal belongings within reach.

## 2022-05-01 NOTE — RT PROTOCOL NOTE
RESPIRATORY ASSESSMENT PROTOCOL                                                                                              Patient Name: [facility] Room#: 7363/[id number] : 1945     Admitting diagnosis: Community acquired bacterial pneumonia [J15.9]  Multifocal pneumonia [J18.9]       Medical History:   Past Medical History:   Diagnosis Date    Bronchiectasis with acute exacerbation ([facility] 2022    Chronic pain syndrome     dilaudid infusion pump    [facility]     Depression     GERD (gastroesophageal reflux disease)     Hypothyroidism     Osteoporosis     Pulmonary fibrosis ([facility] 2022       PATIENT ASSESSMENT    LABORATORY DATA  Hematology:   Lab Results   Component Value Date    WBC 9.5 2022    RBC 3.87 2022    HGB 11.7 2022    HCT 37.6 2022     2022     Chemistry:    Lab Results   Component Value Date    PHART 7.398 2022    KOV3ANL 54.3 2022    PO2ART 75.9 2022    H2NSQOYM 95.0 2022    UQT4EDF 32.7 2022    PBEA 6.2 2022       VITALS  Pulse: 89   Resp: 18  BP: 135/70  SpO2: 94 % O2 Device: Nasal cannula  Temp: 97.5 °F (36.4 °C)    SKIN COLOR  [x] Normal  [] Pale  [] Dusky  [] Cyanotic    RESPIRATORY PATTERN  [x] Normal  [] Dyspnea  [] Cheyne-Granger  [] Kussmaul  [] Biots    AMBULATORY  [] Yes  [] No  [x] With Assistance      Patient Acuity 0 1 2 3 4 Score   Level of Concious (LOC) [x]  Alert & Oriented or Pt normal LOC []  Confused;follows directions []  Confused & uncooper-ative []  Obtunded []  Comatose 0   Respiratory Rate  (RR) [x]  Reg. rate & pattern. 12 - 20 bpm  []  Increased RR.  Greater than 20 bpm   []  SOB w/ exertion or RR greater than 24 bpm []  Access- ory muscle use at rest. Abn.  resp. []  SOB at rest.   0   Bilateral Breath Sounds (BBS) []  Clear [x]  Diminish-ed bases  []  Diminish-ed t/o, or rales   []  Sporadic, scattered wheezes or rhonchi []  Persistentwheezes and, or absent BBS 1   Cough []  Strong, effective, & non-prod. []  Effective & prod. Less than 25 ml (2 TBSP) over past 24 hrs [x]  Ineffective & non-prod to less than 25 ML over past 24 hrs []  Ineffective and, or greater than 25 ml sputum prod. past 24 hrs. []  Nonspon- taneous; Requires suctioning 2   Pulmonary History  (PULM HX) []  No smoking and no chronic pulmonaryhistory []  Former smoker. Quit over 12 mos. ago []  Current smoker or quit w/ in 12 mos []  Pulm. History and, or 20 pk/yr smoking hx [x]  Admitted w/ acute pulm. dx and, or has been admitted w/ pulm. dx 2 or more times over past 12 mos 4   Surgical History this Admit  (SURG HX) [x]  No surgery []  General surgery []  Lower abdominal []  Thoracic or upper abdominal   []  Thoracic w/ pulm. disease 0   Chest X-Ray (CXR)/CT Scan []  Clear or not applicable []  Not available []  Atelect- asis or pleural effusions []  Localized infiltrate or pulm. edema [x]  Con-solidated Infiltrates, bilateral, or in more than 1 lobe 4   Slow or Forced VC, FEV1 OR PEFR (PULM FXN)  [x]  80% or greater, or not indicated []  Pt. unable to perform []  FEV1 or PEFR or VC 51-79%. []  FEV1 or PEFR or VC  30-49%   []  FEV1 or PEFR or VC less than 30%   0   TOTAL ACUITY: 10       CARE PLAN    If Acuity Level is 2, 3, or 4 in any of the following:    [] BILATERAL BREATH SOUNDS (BBS)     [x] PULMONARY HISTORY (PULM HX)  [] PULMONARY FUNCTION (PULM FX)    Goal: Improve respiratory functions in patients with airway disease and decrease WOB    [x] AEROSOL PROTOCOL    Total Acuity:   16-32  []  Secondary Assessment in 24 hrs Total Acuity:  9-15  [x]  Secondary Assessment in 24 hrs Total Acuity:  4-8  []  Secondary Assessment in 48 hrs Total Acuity:  0-3  []  Secondary Assessment in 72 hrs   HHN AEROSOL THERAPY with  [physician-ordered bronchodilator(s)] q 4 & Albuterol PRN q2 hrs. Breath-Actuated Neb if BBS Acuity = 4, and pt. can use MP.  Notify physician if condition deteriorates. HHN AEROSOL THERAPY with  [physician-ordered bronchodilator(s)]  QID and Albuterol PRN q4 hrs. Breath-Actuated Neb if BBS Acuity = 4, and pt. can use MP. Notify physician if condition deteriorates. MDI THERAPY with  2 actuations of [physician-ordered bronchodilator(s)] via spacer TID Albuterol and PRNq4 hrs. If unable to utilize MDI: HHN [physician-ordered bronchodilator(s)] TID and Albuterol PRN q4 hrs. Notify physician if condition deteriorates. MDI THERAPY with  [physician-ordered bronchodilator(s)] via spacer TID PRN. If unable to utilize MDI: HHN [physician-ordered bronchodilator(s)] TID PRN. Notify physician if condition deteriorates. If Acuity Level is 2, 3, or 4 in any of the following:    [] COUGH     [] SURGICAL HISTORY (SURG HX)  [x] CHEST XRAY (CXR)    Goal: Improvement in sputum mobilization in patients with ineffective airway clearance. Reverse atelectasis. [x] Bronchopulmonary Hygiene Protocol    Total Acuity:   16-32  []  Secondary Assessment in 24 hrs Total Acuity:  9-15  [x]  Secondary Assessment in 24 hrs Total Acuity:  4-8  []  Secondary Assessment in 48 hrs Total Acuity:  0-3  []  Secondary Assessment in 72 hrs   METANEB QID with [physician-ordered bronchodilator(s)] if CXR Acuity = 4; otherwise:  PD&P, PEP, or Vest QID & PRN  NT Sxn PRN for ineffective cough  METANEB QID with [physician-ordered bronchodilator(s)] if CXR Acuity = 4; otherwise:  PD&P, PEP, or Vest TID & PRN  NT Sxn PRN for ineffective cough  Instruct patient to self-perform IS q1hr WA   Directed Cough self-performed q1hr WA     If Acuity Level is 2 or above in the following:    [] PULMONARY HISTORY (PULM HX)    Goal: Assist patient in quitting smoking to slow or stop the progression of lung disease.     [] Smoking Cessation Protocol    SMOKING CESSATION EDUCATION provided according to policy [id number]: (marlyn with an X)  ____Yes    ____ No     ____ NA    Smoking Cessation Booklet given:  ____Yes  ____No ____Patient Pipe Bee

## 2022-05-01 NOTE — PROGRESS NOTES
Writer to bedside to complete morning assessment. Upon entry to room, pt is awake and in chair, respirations easy and unlabored while on 2 L/min via nasal canula. Vitals obtained and assessment completed, see flow sheet for details. Pt denies needs from writer at this time. Call light in reach. Will continue to monitor.

## 2022-05-02 ENCOUNTER — HOSPITAL ENCOUNTER (OUTPATIENT)
Dept: CT IMAGING | Age: 77
Discharge: HOME | DRG: 193 | End: 2022-05-04
Payer: MEDICARE

## 2022-05-02 VITALS
BODY MASS INDEX: 26.66 KG/M2 | TEMPERATURE: 97.8 F | OXYGEN SATURATION: 95 % | WEIGHT: 160 LBS | HEART RATE: 85 BPM | RESPIRATION RATE: 20 BRPM | HEIGHT: 65 IN | SYSTOLIC BLOOD PRESSURE: 117 MMHG | DIASTOLIC BLOOD PRESSURE: 64 MMHG

## 2022-05-02 DIAGNOSIS — M48.062 SPINAL STENOSIS OF LUMBAR REGION WITH NEUROGENIC CLAUDICATION: ICD-10-CM

## 2022-05-02 LAB
ABSOLUTE EOS #: <0.03 K/UL (ref 0–0.44)
ABSOLUTE IMMATURE GRANULOCYTE: 0.04 K/UL (ref 0–0.3)
ABSOLUTE LYMPH #: 0.54 K/UL (ref 1.1–3.7)
ABSOLUTE MONO #: 0.31 K/UL (ref 0.1–1.2)
ANION GAP SERPL CALCULATED.3IONS-SCNC: 7 MMOL/L (ref 9–17)
BASOPHILS # BLD: 0 % (ref 0–2)
BASOPHILS ABSOLUTE: <0.03 K/UL (ref 0–0.2)
BUN BLDV-MCNC: 19 MG/DL (ref 8–23)
BUN/CREAT BLD: 41 (ref 9–20)
CALCIUM SERPL-MCNC: 9.3 MG/DL (ref 8.6–10.4)
CHLORIDE BLD-SCNC: 102 MMOL/L (ref 98–107)
CO2: 29 MMOL/L (ref 20–31)
CREAT SERPL-MCNC: 0.46 MG/DL (ref 0.5–0.9)
EOSINOPHILS RELATIVE PERCENT: 0 % (ref 1–4)
GFR AFRICAN AMERICAN: >60 ML/MIN
GFR NON-AFRICAN AMERICAN: >60 ML/MIN
GFR SERPL CREATININE-BSD FRML MDRD: ABNORMAL ML/MIN/{1.73_M2}
GFR SERPL CREATININE-BSD FRML MDRD: ABNORMAL ML/MIN/{1.73_M2}
GLUCOSE BLD-MCNC: 111 MG/DL (ref 70–99)
HCT VFR BLD CALC: 40.3 % (ref 36.3–47.1)
HEMOGLOBIN: 12.6 G/DL (ref 11.9–15.1)
IMMATURE GRANULOCYTES: 1 %
LYMPHOCYTES # BLD: 9 % (ref 24–43)
MCH RBC QN AUTO: 30.5 PG (ref 25.2–33.5)
MCHC RBC AUTO-ENTMCNC: 31.3 G/DL (ref 28.4–34.8)
MCV RBC AUTO: 97.6 FL (ref 82.6–102.9)
MONOCYTES # BLD: 5 % (ref 3–12)
NRBC AUTOMATED: 0 PER 100 WBC
PDW BLD-RTO: 14.1 % (ref 11.8–14.4)
PLATELET # BLD: 215 K/UL (ref 138–453)
PMV BLD AUTO: 10.1 FL (ref 8.1–13.5)
POTASSIUM SERPL-SCNC: 4.1 MMOL/L (ref 3.7–5.3)
RBC # BLD: 4.13 M/UL (ref 3.95–5.11)
SEG NEUTROPHILS: 85 % (ref 36–65)
SEGMENTED NEUTROPHILS ABSOLUTE COUNT: 5.02 K/UL (ref 1.5–8.1)
SODIUM BLD-SCNC: 138 MMOL/L (ref 135–144)
WBC # BLD: 5.9 K/UL (ref 3.5–11.3)

## 2022-05-02 PROCEDURE — 36415 COLL VENOUS BLD VENIPUNCTURE: CPT

## 2022-05-02 PROCEDURE — 6360000002 HC RX W HCPCS: Performed by: NURSE PRACTITIONER

## 2022-05-02 PROCEDURE — 97110 THERAPEUTIC EXERCISES: CPT

## 2022-05-02 PROCEDURE — 85025 COMPLETE CBC W/AUTO DIFF WBC: CPT

## 2022-05-02 PROCEDURE — 97116 GAIT TRAINING THERAPY: CPT

## 2022-05-02 PROCEDURE — 2700000000 HC OXYGEN THERAPY PER DAY

## 2022-05-02 PROCEDURE — 72131 CT LUMBAR SPINE W/O DYE: CPT

## 2022-05-02 PROCEDURE — 80048 BASIC METABOLIC PNL TOTAL CA: CPT

## 2022-05-02 PROCEDURE — 6370000000 HC RX 637 (ALT 250 FOR IP): Performed by: INTERNAL MEDICINE

## 2022-05-02 PROCEDURE — 94640 AIRWAY INHALATION TREATMENT: CPT

## 2022-05-02 PROCEDURE — 2580000003 HC RX 258: Performed by: INTERNAL MEDICINE

## 2022-05-02 RX ORDER — GUAIFENESIN/DEXTROMETHORPHAN 100-10MG/5
5 SYRUP ORAL
Qty: 120 ML | Refills: 0 | Status: ON HOLD | OUTPATIENT
Start: 2022-05-02 | End: 2022-08-29

## 2022-05-02 RX ORDER — AMOXICILLIN AND CLAVULANATE POTASSIUM 875; 125 MG/1; MG/1
1 TABLET, FILM COATED ORAL 2 TIMES DAILY
Qty: 10 TABLET | Refills: 0 | Status: SHIPPED | OUTPATIENT
Start: 2022-05-02 | End: 2022-05-07

## 2022-05-02 RX ORDER — PREDNISONE 10 MG/1
TABLET ORAL
Qty: 41 TABLET | Refills: 0 | Status: SHIPPED | OUTPATIENT
Start: 2022-05-02 | End: 2022-05-16

## 2022-05-02 RX ADMIN — PREGABALIN 300 MG: 75 CAPSULE ORAL at 08:26

## 2022-05-02 RX ADMIN — PANTOPRAZOLE SODIUM 40 MG: 40 TABLET, DELAYED RELEASE ORAL at 06:39

## 2022-05-02 RX ADMIN — ASPIRIN 81 MG: 81 TABLET, CHEWABLE ORAL at 08:27

## 2022-05-02 RX ADMIN — HYDROCODONE BITARTRATE AND ACETAMINOPHEN 1 TABLET: 10; 325 TABLET ORAL at 04:27

## 2022-05-02 RX ADMIN — LEVOTHYROXINE SODIUM 150 MCG: 150 TABLET ORAL at 06:39

## 2022-05-02 RX ADMIN — POTASSIUM CHLORIDE 20 MEQ: 1500 TABLET, EXTENDED RELEASE ORAL at 08:27

## 2022-05-02 RX ADMIN — FUROSEMIDE 20 MG: 20 TABLET ORAL at 08:27

## 2022-05-02 RX ADMIN — DULOXETINE 60 MG: 60 CAPSULE, DELAYED RELEASE ORAL at 08:27

## 2022-05-02 RX ADMIN — IPRATROPIUM BROMIDE AND ALBUTEROL SULFATE 3 ML: 2.5; .5 SOLUTION RESPIRATORY (INHALATION) at 05:29

## 2022-05-02 RX ADMIN — GUAIFENESIN AND DEXTROMETHORPHAN 5 ML: 100; 10 SYRUP ORAL at 06:39

## 2022-05-02 RX ADMIN — HYDROXYCHLOROQUINE SULFATE 300 MG: 200 TABLET ORAL at 08:26

## 2022-05-02 RX ADMIN — METHYLPREDNISOLONE SODIUM SUCCINATE 60 MG: 125 INJECTION, POWDER, FOR SOLUTION INTRAMUSCULAR; INTRAVENOUS at 08:27

## 2022-05-02 RX ADMIN — SODIUM CHLORIDE, PRESERVATIVE FREE 10 ML: 5 INJECTION INTRAVENOUS at 08:29

## 2022-05-02 ASSESSMENT — PAIN SCALES - GENERAL
PAINLEVEL_OUTOF10: 0
PAINLEVEL_OUTOF10: 7
PAINLEVEL_OUTOF10: 7

## 2022-05-02 ASSESSMENT — PAIN DESCRIPTION - ORIENTATION: ORIENTATION: LOWER

## 2022-05-02 ASSESSMENT — PAIN DESCRIPTION - LOCATION: LOCATION: BACK

## 2022-05-02 ASSESSMENT — PAIN DESCRIPTION - DESCRIPTORS: DESCRIPTORS: ACHING

## 2022-05-02 NOTE — ACP (ADVANCE CARE PLANNING)
Advance Care Planning     Advance Care Planning Activator (Inpatient)  Conversation Note      Date of ACP Conversation: 5/2/2022     Conversation Conducted with: Patient with Decision Making Capacity    ACP Activator: Krystin Cobso RN        Health Care Decision Maker:     Current Designated Health Care Decision Maker:     Primary Decision Maker: Branden Park Spouse - [phone]    Secondary Decision Maker: Christel Grant - Child - [phone]    Supplemental (Other) Decision Maker: Laura Quiñones - Child - [phone]        Care Preferences    Ventilation: \"If you were in your present state of health and suddenly became very ill and were unable to breathe on your own, what would your preference be about the use of a ventilator (breathing machine) if it were available to you? \"      Would the patient desire the use of ventilator (breathing machine)?: yes    \"If your health worsens and it becomes clear that your chance of recovery is unlikely, what would your preference be about the use of a ventilator (breathing machine) if it were available to you? \"     Would the patient desire the use of ventilator (breathing machine)?: Yes      Resuscitation  \"CPR works best to restart the heart when there is a sudden event, like a heart attack, in someone who is otherwise healthy. Unfortunately, CPR does not typically restart the heart for people who have serious health conditions or who are very sick. \"    \"In the event your heart stopped as a result of an underlying serious health condition, would you want attempts to be made to restart your heart (answer \"yes\" for attempt to resuscitate) or would you prefer a natural death (answer \"no\" for do not attempt to resuscitate)? \" yes       [x] Yes   [] No   Educated Patient / [place] regarding differences between Advance Directives and portable DNR orders.     Length of ACP Conversation in minutes:  10  Conversation Outcomes:  [x] ACP discussion completed  [] Existing advance directive reviewed with patient; no changes to patient's previously recorded wishes  [] New Advance Directive completed  [] Portable Do Not Rescitate prepared for Provider review and signature  [] POLST/POST/MOLST/MOST prepared for Provider review and signature      Follow-up plan:    [] Schedule follow-up conversation to continue planning  [x] Referred individual to Provider for additional questions/concerns   [] Advised patient/agent/surrogate to review completed ACP document and update if needed with changes in condition, patient preferences or care setting    [] This note routed to one or more involved healthcare providers    [address] [place] [address] and [place] Nurse Coordinator  5/2/2022 9:20 AM

## 2022-05-02 NOTE — PROGRESS NOTES
Order patient's chest vest from [employer] where she receives her oxygen. Paper work done and fax to the DME.   BUZZ Lambert

## 2022-05-02 NOTE — PROGRESS NOTES
Writer assisted patient from her chair to her bed, patient tolerated well. Call light within reach, will continue to monitor.

## 2022-05-02 NOTE — CONSULTS
Palliative Care Inpatient Evaluation    NAME:  Janet [facility] RECORD NUMBER:  [id number]  AGE: 77 y.o. GENDER: female  : 1945  TODAY'S DATE:  2022    Reasons for Consultation:    Provision of information regarding PC and/or hospice philosophies  Complex, time-intensive communication and interdisciplinary psychosocial support  Clarification of goals of care and/or assistance with difficult decision-making  Guidance in regards to resources and transition(s)    Code Status:     History of Present Illness     The patient is a 77 y.o. Non- / non  female who presents with Shortness of Breath and Fever    Referred to Palliative Care by   [] Physician   [x] Nursing  [] Family Request   [] Other:       She was admitted to the med/surg service for Community acquired bacterial pneumonia [J15.9]  Multifocal pneumonia [J18.9]. Her hospital course has been associated with Community acquired bacterial pneumonia.  The patient has a complicated medical history and has been hospitalized since 2022 10:35 AM.    Active Hospital Problems    Diagnosis Date Noted    Pulmonary fibrosis ([facility] [J84.10] 2022     Priority: Medium    Bronchiectasis with acute exacerbation ([facility] [J47.1] 2022     Priority: Medium    Mild malnutrition ([facility] [E44.1] 2022     Priority: Medium     Class: Present on Admission    Community acquired bacterial pneumonia [J15.9] 2022     Priority: Medium    Acute respiratory failure with hypoxia and hypercapnia (HCC) [J96.01, J96.02]     COPD (chronic obstructive pulmonary disease) ([facility] [J44.9] 10/22/2020    Chronic midline low back pain without sciatica [M54.50, G89.29] 10/05/2018       Data        Code Status: Full Code     ADVANCED CARE PLANNING:  Patient has capacity for medical decisions: yes  Health Care Power of : no  Living Will: no     Personal, Social, and Family History  Marital Status:   Living situation:with family: spouse  Courtney/Spiritual History:   Declines at this time. States that she is going to go to her sister's [employer]  Psychological Distress: denies  Does patient understand diagnosis/treatment? yes  Does family/caregiver understand diagnosis/treatment? yes    Assessment        Palliative Performance Scale:    ___100% Full ambulation; normal activity and work; no evidence of disease; able to do own self care; normal intake; fully conscious  ___90% Full ambulation; normal activity and work; some evidence of disease; able to do own self care; normal intake; fully conscious  __x_80% Full ambulation; normal activity with effort; some evidence of disease; able to do own self care; normal or reduced intake; fully conscious  ___70% Ambulation reduced; unable to perform normal job/work; significant disease; able to do own self care; normal or reduced intake; fully conscious  ___60%  Ambulation reduced; cannot do hobbies/housework; significant disease; occasional assist; intake normal or reduced; fully conscious/some confusion  ___50%  Mainly sit/lie; can't do any work; extensive disease; considerable assist; intake normal or reduced; fully conscious/some confusion  ___40%  Mainly in bed; extensive disease; mainly assist; intake normal or reduced; fully conscious/ some confusion   ___30%  Bed bound; extensive disease; total care; intake reduced; fully conscious/some confusion  ___20%  Bed bound; extensive disease; total care; intake minimal; drowsy/coma  ___10%  Bed bound; extensive disease; total care; mouth care only; drowsy/coma  ___0       Death           Readmission Risk Score: 18.7 ( )        Plan        Palliative Interaction: Palliative care consult, patient's readmission score was 20%. ACP note completed. Pt is awake, alert and oriented x 3. Pt is to be discharged today, and is happy about that. Her  is in the room with her at this time. Pt states that she wants everything done for her to keep her alive. She relates that she had pneumonia a while ago and was on the ventilator for 11 days, the physician told her  that she was not going to make it, but she survived. From this experience, she wants everything done that is possible. I ask if her family knows her wishes and she stated that they do. Pt does not have a DPOAHC or Living Will, I explain the purpose for both but she declines to complete them at this time. I give her information on how to set up an outpatient appointment if she would like to in the future. Pt denies any further needs or questions at this time. Education/support to family  Education/support to patient  Continue with current plan of care  Code status clarified: Full Code    Principle Problem/Diagnosis:  Community acquired bacterial pneumonia [J15.9]  Multifocal pneumonia [J18.9]    Goals of care evaluation:  The patient goals of care are live longer, improve or maintain function/quality of life and preserve independence/autonomy/control   Goals of care discussed with:    [] Patient independently    [x] Patient and Family    [] Family or Healthcare DPOA independently    [] Unable to discuss with patient, family/DPOA not present    Code Status  Full Code    Palliative Care will continue to follow Ms. South's care as needed. Thank you for allowing Palliative Care to participate in the care of Ms. South .     Electronically signed by   Arlen Christian RN  Palliative Care Team  on 5/2/2022 at 9:22 AM    Palliative care office: [phone]

## 2022-05-02 NOTE — PROGRESS NOTES
Patients left proximal IV out of place and could not flush, writer removed patients IV. Patient still has IV access in left distal forearm.

## 2022-05-02 NOTE — DISCHARGE SUMMARY
Discharge Summary    Madhu Abdi  :  1945  MRN:  [id number]    Admit date:  2022      Discharge date: 2022     Admitting Physician:  Airam Dixon MD    Discharge Diagnoses:    Principal Problem:    Community acquired bacterial pneumonia  Active Problems:    Pulmonary fibrosis ([address]    Bronchiectasis with acute exacerbation ([address]    Mild malnutrition ([address]    Chronic midline low back pain without sciatica    COPD (chronic obstructive pulmonary disease) ([address]    Acute respiratory failure with hypoxia and hypercapnia (HCC)  Resolved Problems:    * No resolved hospital problems. [facility] Course: Madhu Abdi is a 77 y.o. female admitted with CAP. She presented to the ER with complaints of SOB. Patient has history of COPD with chronic hypoxia with 2L of oxygen continuously. She was transported by EMS from home. Symptom onset \"few days\". She has a history of PN with 4 previous admissions this year alone. She complained of productive cough with complaints of CP with cough. Denied n/v. Denied abdominal pain. Chest xray unchanged extensive bilateral interstitial opacities possibly scarring or fibrosis with history of Covid PN. She was febrile 100.8 upon arrival and oxygen was increased to 4L. She was influenza and covid negative. Patient was placed on IV steroids and antibotics. She tolerated well. Pulmonology was consulted. No bronchoscopy needed at this time. Likely bronchiectasis and fibrosis post covid infection with respiratory failure and COPD. She tolerated therapy well. Chest vest was used with improvement of symptoms and mobility of secretions. She will be discharged home today and will continue with Augmentin for 5 more days, steroid taper, Robitussin.   She was provided a Rx for Chest Vest and  is assisting with receiving of vest.      Consultants:  Dr. Kentrell Sanford, Pulmonology    Procedures: none    Complications: none    Discharge Condition: fair    Exam:  GEN:    Awake, alert and oriented x3. EYES:   EOMI, pupils equal   NECK: Supple. No lymphadenopathy. No carotid bruit  CVS:     regular rate and rhythm, no audible murmur  PULM:  diminished with coarse rhonchi, scattered rales no acute respiratory distress  ABD:     Bowels sounds normal.  Abdomen is soft. No distention. no tenderness to palpation. EXT:     trace edema bilaterally . No calf tenderness. NEURO: Moves all extremities. Motor and sensory are grossly intact  SKIN:    No rashes. No skin lesions. Significant Diagnostic Studies:   Lab Results   Component Value Date    WBC 5.9 05/02/2022    HGB 12.6 05/02/2022     05/02/2022       Lab Results   Component Value Date    BUN 19 05/02/2022    CREATININE 0.46 (L) 05/02/2022     05/02/2022    K 4.1 05/02/2022    CALCIUM 9.3 05/02/2022     05/02/2022    CO2 29 05/02/2022    LABGLOM >60 05/02/2022       Lab Results   Component Value Date    WBCUA 2 TO 5 04/29/2022    RBCUA 0 TO 2 04/29/2022    EPITHUA 0 TO 2 04/29/2022    LEUKOCYTESUR NEGATIVE 04/29/2022    SPECGRAV 1.015 04/29/2022    GLUCOSEU NEGATIVE 04/29/2022    KETUA NEGATIVE 04/29/2022    PROTEINU NEGATIVE 04/29/2022    HGBUR NEGATIVE 04/29/2022    CASTUA NOT REPORTED 10/14/2021    CRYSTUA NOT REPORTED 10/14/2021    BACTERIA NOT REPORTED 10/14/2021    YEAST NOT REPORTED 10/14/2021       CT CHEST WO CONTRAST    Result Date: 4/28/2022  EXAMINATION: CT OF THE CHEST WITHOUT CONTRAST 4/28/2022 4:35 pm TECHNIQUE: CT of the chest was performed without the administration of intravenous contrast. Multiplanar reformatted images are provided for review. Dose modulation, iterative reconstruction, and/or weight based adjustment of the mA/kV was utilized to reduce the radiation dose to as low as reasonably achievable.  COMPARISON: February 5, 2022 HISTORY: ORDERING SYSTEM PROVIDED HISTORY: recurrent pneumonia TECHNOLOGIST PROVIDED HISTORY: recurrent pneumonia FINDINGS: Mediastinum: Heart size is normal. There is no pericardial effusion. Without intravenous contrast, evaluation for adenopathy is of lower sensitivity. Within the limitations of a noncontrast exam, there is no evidence of hilar or mediastinal lymphadenopathy. Calcific coronary artery disease. Lungs/pleura: Patchy multifocal ground-glass opacities appear somewhat improved. Upper Abdomen: Moderate hiatal hernia. Soft Tissues/Bones: Mild scoliosis. Moderate kyphosis. Cervical hardware. Intraspinal electrodes noted in the thoracic and lumbar spine. Retrolisthesis L2-3 incompletely imaged. Moderate multifocal ground-glass opacities consistent with patient's history of COVID-19, somewhat improved. Other incidental findings as above. XR CHEST PORTABLE    Result Date: 4/28/2022  EXAMINATION: ONE XRAY VIEW OF THE CHEST 4/28/2022 10:53 am COMPARISON: 03/08/2022 HISTORY: ORDERING SYSTEM PROVIDED HISTORY: Dyspnea TECHNOLOGIST PROVIDED HISTORY: Dyspnea FINDINGS: Cardiomediastinal silhouette stable. Unchanged pulmonary opacities. No pneumothorax. No pleural effusion.      Unchanged extensive bilateral interstitial opacities could represent scarring or fibrosis the patient's known COVID pneumonia       Assessment and Plan:  Patient Active Problem List    Diagnosis Date Noted    Pulmonary fibrosis ([address] 04/29/2022    Bronchiectasis with acute exacerbation ([address] 04/29/2022    Mild malnutrition ([address] 04/29/2022    Community acquired bacterial pneumonia 04/28/2022    Atypical pneumonia 02/05/2022    New onset a-fib ([address] 02/01/2022    Community acquired pneumonia 01/16/2022    Anemia 10/14/2021    Biventricular congestive heart failure ([address]     Acute respiratory failure with hypoxia and hypercapnia (HCC)     COPD (chronic obstructive pulmonary disease) ([address] 10/22/2020    Hypothyroidism 10/05/2018    Major depressive disorder 10/05/2018    Chronic midline low back pain without sciatica 10/05/2018    Iron deficiency anemia 10/05/2018        Discharge Medications:         Medication List      START taking these medications    amoxicillin-clavulanate 875-125 MG per tablet  Commonly known as: AUGMENTIN  Take 1 tablet by mouth 2 times daily for 5 days     predniSONE 10 MG tablet  Commonly known as: DELTASONE  Take 3 tablets by mouth 2 times daily for 3 days, THEN 2 tablets 2 times daily for 3 days, THEN 1 tablet 2 times daily for 3 days, THEN 1 tablet daily for 5 days. Start taking on: May 2, 2022        CHANGE how you take these medications    furosemide 20 MG tablet  Commonly known as: Lasix  Take 1 tablet by mouth daily  What changed: additional instructions        CONTINUE taking these medications    albuterol sulfate  (90 Base) MCG/ACT inhaler  Inhale 2 puffs into the lungs 4 times daily     Anoro Ellipta 62.5-25 MCG/INH Aepb inhaler  Generic drug: umeclidinium-vilanterol  Inhale 1 puff into the lungs daily     aspirin 81 MG tablet     calcium citrate-vitamin D 315-250 MG-UNIT Tabs per tablet  Commonly known as: CITRICAL + D     dextrose 5 % SOLN with HYDROmorphone HCl PF 10 MG/ML SOLN 1 mg/mL     DULoxetine 60 MG extended release capsule  Commonly known as: CYMBALTA     Fosamax 70 MG tablet  Generic drug: alendronate     guaiFENesin-dextromethorphan 100-10 MG/5ML syrup  Commonly known as: ROBITUSSIN DM  Take 5 mLs by mouth every 4 hours (while awake)     HYDROcodone-acetaminophen  MG per tablet  Commonly known as: NORCO     ipratropium-albuterol 0.5-2.5 (3) MG/3ML Soln nebulizer solution  Commonly known as: DUONEB     levothyroxine 150 MCG tablet  Commonly known as: SYNTHROID     pantoprazole sodium 40 MG Pack packet  Commonly known as: PROTONIX     Plaquenil 200 MG tablet  Generic drug: hydroxychloroquine     potassium chloride 20 MEQ packet  Commonly known as: KLOR-CON     pregabalin 300 MG capsule  Commonly known as: LYRICA  Take 1 capsule by mouth 2 times daily for 30 days.      traZODone 100 MG tablet  Commonly known as: DESYREL     Xarelto 20 MG Tabs tablet  Generic drug: rivaroxaban  TAKE 1 TABLET BY MOUTH  DAILY WITH BREAKFAST        STOP taking these medications    ascorbic acid 1000 MG tablet  Commonly known as: VITAMIN C     vitamin D 50 MCG (2000 UT) Tabs tablet  Commonly known as: CHOLECALCIFEROL           Where to Get Your Medications      These medications were sent to [address] [facility] [place], [address] [place] [address]  [address], [address]    Phone: [phone]   · amoxicillin-clavulanate 875-125 MG per tablet  · guaiFENesin-dextromethorphan 100-10 MG/5ML syrup  · predniSONE 10 MG tablet         Patient Instructions:    Activity: activity as tolerated  Diet: cardiac diet  Wound Care: none needed  Other: na     Disposition:   Discharge to Home    Follow up:  Patient will be followed by Ina Pa MD in 1-2 weeks    CORE MEASURES on Discharge (if applicable)  ACE/ARB in CHF: NA  Statin in MI: NA  ASA in MI: NA  Statin in CVA: NA  Antiplatelet in CVA: NA    Total time spent on discharge services: 40 minutes    Including the following activities:  Evaluation and Management of patient  Discussion with patient and/or surrogate about current care plan  Coordination with Case Management and/or   Coordination of care with Consultants (if applicable)   Coordination of care with Receiving Facility Physician (if applicable)  Completion of DME forms (if applicable)  Preparation of Discharge Summary  Preparation of Medication Reconciliation  Preparation of Discharge Prescriptions    Signed:  RYAN Donaldson CNP, RYAN, NP-C  5/2/2022, 8:45 AM

## 2022-05-02 NOTE — PROGRESS NOTES
Physical Therapy  Facility/Department: [facility] MED SURG  Daily Treatment Note  NAME: Karissa Blake  : 1945  MRN: [id number]    Date of Service: 2022    Discharge Recommendations:  Continue to assess pending progress,Patient would benefit from continued therapy after discharge     Patient Diagnosis(es): The encounter diagnosis was Multifocal pneumonia. Assessment   Assessment: Pt. left sitting in chair with call light within reach and RN in room. Activity Tolerance: Patient tolerated treatment well     Plan    Plan  Plan: 2 times a day 7 days a week  Current Treatment Recommendations: Strengthening;Balance training;Functional mobility training;Transfer training; Endurance training;Gait training;Stair training;Neuromuscular re-education;Home exercise program;Safety education & training     Subjective    Subjective  Subjective: Pt. in [facility] upon arrival, stated she thinks she will be going home today. Agreeable to therapy at this time. Pain: Pt reports low back and LLE pain 7/10. states her pain is always a 7 and some times worse. Orientation  Overall Orientation Status: Within Normal Limits     Objective   Transfer Training  Transfer Training: Yes  Overall Level of Assistance: Stand-by assistance;Supervision  Sit to Stand: Stand-by assistance;Supervision  Stand to Sit: Stand-by assistance;Supervision  Toilet Transfer: Stand-by assistance;Supervision  Gait Training  Gait Training: Yes  Gait  Overall Level of Assistance: Stand-by assistance;Supervision  Base of Support: Widened;Shift to right  Speed/Helen: Slow  Distance (ft): 50 Feet  Assistive Device: Walker, rolling  PT Exercises  Exercise Treatment: Seated exercises B Le x20.  STS x5    Patient Education  Education Given To: Patient  Education Provided: Plan of Care;Role of Therapy;Transfer Training;Home Exercise Program  Education Method: Verbal;Demonstration  Barriers to Learning: None  Education Outcome: Verbalized understanding    Goals  Short Term Goals  Time Frame for Short term goals: 20 days  Short term goal 1: Initiate HEP and progress as tolerated for improved tolerance to ADLs  Short term goal 2: Pt will be independent with bed mobility in order to discharge home with spouse. Short term goal 3: Pt will transfer with modified independence with least restrictive device and good safety to reduce fall risk. Short term goal 4: Pt will ambulate up to 100 feet in order to exit and enter her home safely for appointments. Short term goal 5: Pt will negotiate 1 to 2 steps with rails for negotiation of step to enter home safely.   Patient Goals   Patient goals : return home      Therapy Time   Individual Concurrent Group Co-treatment   Time In 0810         Time Out 0834         Minutes [address] [place] [address]

## 2022-05-02 NOTE — PROGRESS NOTES
Patient in chair at this time resting. Vitals and assessment completed. Vitals stable and WDL. Patient alert and oriented. Patient on 2L at this time which she wears at home. Patient denies having needs. Patient stated she has chronic back pain. Patient denies dizziness or nausea. Call light within reach. Will continue to monitor.

## 2022-05-02 NOTE — PROGRESS NOTES
Patient called out for pain medication due to pain in her lower back. Patient given her PRN norco 10-325mg tablet. Patient moved to her chair after using the restroom an is now drinking coffee and reading. Call light is within reach, patient has no needs at this time.

## 2022-05-02 NOTE — PROGRESS NOTES
Patient is in bed resting with his eyes closed, respirations are even and unlabored. Call light remains within reach, will continue to monitor.

## 2022-05-02 NOTE — DISCHARGE INSTR - DIET
Good nutrition is important when healing from an illness, injury, or surgery. Follow any nutrition recommendations given to you during your hospital stay. If you were given an oral nutrition supplement while in the hospital, continue to take this supplement at home. You can take it with meals, in-between meals, and/or before bedtime. These supplements can be purchased at most local grocery stores, pharmacies, and chain [employer]-stores. If you have any questions about your diet or nutrition, call the hospital and ask for the dietitian.   CARDIAC DIET- AVOID SALTY/FATTY FOODS

## 2022-05-02 NOTE — PLAN OF CARE
Problem: Discharge Planning  Goal: Discharge to home or other facility with appropriate resources  5/2/2022 1031 by Moy Trevino RN  Outcome: Completed     Problem: Pain  Goal: Verbalizes/displays adequate comfort level or baseline comfort level  5/2/2022 1031 by Moy Trevino RN  Outcome: Completed     Problem: Safety - Adult  Goal: Free from fall injury  5/2/2022 1031 by Moy Trevino RN  Outcome: Completed     Problem: Chronic Conditions and Co-morbidities  Goal: Patient's chronic conditions and co-morbidity symptoms are monitored and maintained or improved  Outcome: Completed     Problem: ABCDS Injury Assessment  Goal: Absence of physical injury  Outcome: Completed

## 2022-05-02 NOTE — PROGRESS NOTES
[facility] -Progress Note    SUBJECTIVE:    Patient seen for f/u of Community acquired bacterial pneumonia. She sitting up in chair in no distress. Tolerating Chest vest well. Afebrile. ROS:   Constitutional: negative  for fevers, and negative for chills. Respiratory: positive for shortness of breath, positive for cough, and negative for wheezing  Cardiovascular: negative for chest pain, and negative for palpitations  Gastrointestinal: negative for abdominal pain, negative for nausea,negative for vomiting, negative for diarrhea, and negative for constipation     All other systems were reviewed with the patient and are negative unless otherwise stated in HPI      OBJECTIVE:        VITAL SIGNS:  Patient Vitals for the past 8 hrs:   BP Temp Temp src Pulse Resp SpO2 Weight   22 0635 117/64 97.8 °F (36.6 °C) Temporal 85 20 95 % --   22 0529 -- -- -- -- -- 94 % --   22 0415 -- -- -- -- -- -- 160 lb (72.6 kg)   22 0300 139/78 97.2 °F (36.2 °C) Temporal 70 18 97 % --         Temp: 97.8 °F (36.6 °C)  Temp range:    Temp  Av.6 °F (36.4 °C)  Min: 97.2 °F (36.2 °C)  Max: 97.9 °F (36.6 °C)    BP: 117/64  BP Range:      Systolic (69FTO), OBM:768 , Min:117 , NOM:677      Diastolic (30CUI), NWD:99, Min:64, Max:85    Pulse: 85  Pulse Range:    Pulse  Av.3  Min: 70  Max: 93    Resp: 20  Resp Range:   Resp  Av.4  Min: 18  Max: 20    SpO2: 95 % on supplemental O2  SpO2 range:   SpO2  Av.3 %  Min: 94 %  Max: 98 %      PaO2/FiO2 RATIO:  No results for input(s): POCPO2 in the last 72 hours. Exam:    GEN:    Awake, alert and oriented x3. EYES:  EOMI, pupils equal   NECK: Supple. No lymphadenopathy. No carotid bruit  CVS:    regular rate and rhythm, no audible murmur  PULM:  diminished with coarse rhonchi, scattered rales no acute respiratory distress  ABD:    Bowels sounds normal.  Abdomen is soft. No distention. no tenderness to palpation. EXT:   trace edema bilaterally . No calf tenderness. NEURO: Moves all extremities. Motor and sensory are grossly intact  SKIN:  No rashes. No skin lesions. Diagnostic Data:      Complete Blood Count:   Recent Labs     04/30/22  0610 05/01/22  0625 05/02/22  0558   WBC 8.6 9.5 5.9   RBC 4.35 3.87* 4.13   HGB 13.1 11.7* 12.6   HCT 42.9 37.6 40.3   MCV 98.6 97.2 97.6   MCH 30.1 30.2 30.5   MCHC 30.5 31.1 31.3   RDW 13.9 14.3 14.1    199 215   MPV 10.0 10.0 10.1        Last 3 Blood Glucose:   Recent Labs     04/30/22  0610 05/01/22  0625 05/02/22  0558   GLUCOSE 145* 145* 111*        Comprehensive Metabolic Profile:   Recent Labs     04/30/22  0610 05/01/22  0625 05/02/22  0558    141 138   K 3.9 4.3 4.1    102 102   CO2 27 31 29   BUN 11 15 19   CREATININE 0.53 0.53 0.46*   GLUCOSE 145* 145* 111*   CALCIUM 10.2 9.7 9.3        Urinalysis:   Lab Results   Component Value Date    NITRU NEGATIVE 04/29/2022    COLORU Yellow 04/29/2022    PHUR 8.5 04/29/2022    WBCUA 2 TO 5 04/29/2022    RBCUA 0 TO 2 04/29/2022    MUCUS NOT REPORTED 10/14/2021    TRICHOMONAS NOT REPORTED 10/14/2021    YEAST NOT REPORTED 10/14/2021    BACTERIA NOT REPORTED 10/14/2021    SPECGRAV 1.015 04/29/2022    LEUKOCYTESUR NEGATIVE 04/29/2022    UROBILINOGEN Normal 04/29/2022    BILIRUBINUR NEGATIVE 04/29/2022    GLUCOSEU NEGATIVE 04/29/2022    KETUA NEGATIVE 04/29/2022    AMORPHOUS NOT REPORTED 10/14/2021       HgBA1c:  No results found for: LABA1C    Lactic Acid:   Lab Results   Component Value Date    LACTA 1.9 04/28/2022    LACTA 2.0 02/06/2022    LACTA 2.5 02/05/2022        Troponin: No results for input(s): TROPONINI in the last 72 hours. CRP:  No results for input(s): CRP in the last 72 hours. Radiology/Imaging:  CT CHEST WO CONTRAST   Final Result   Moderate multifocal ground-glass opacities consistent with patient's history   of COVID-19, somewhat improved. Other incidental findings as above.          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 IV Rocephin and Zithromax  ? Nebs  ? IV Solu-Medrol  ? Out of bed with meals  ? Continue Chest Vest  · Acute on chronic respiratory failure with hypoxia and hypercapnia  ? Acapella  ? Nebs  ? Baseline supplemental oxygen 2 L  ? TEDS  · COPD  ? Continue Stiolto, nebs  ?  IV Solu-Medrol  · Nutrition status:   · Well developed, well nourished with no malnutrition  · Dietician consult initiated  · Hospital Prophylaxis:   · DVT: Xarelto   · Stress Ulcer: PPI   · High risk medications: none   · Disposition:    · Discharge plan is home today  · Chest Vest - Requires for facilitation of pulmonary secretions due to bronchiectasis and fibrosis secondary to post covid infection and COPD      RYAN Aleman - CNP , APRN, NP-C  [facility]        5/2/2022, 8:31 AM

## 2022-05-02 NOTE — PROGRESS NOTES
Patient vitals and reassessment done at this time, no changes in assessment. Vitals are within normal limits. Patient has no questions or concerns at this time, call light within reach, will continue to monitor.

## 2022-05-02 NOTE — PLAN OF CARE
Problem: Discharge Planning  Goal: Discharge to home or other facility with appropriate resources  Outcome: Progressing     Problem: Pain  Goal: Verbalizes/displays adequate comfort level or baseline comfort level  Outcome: Progressing  Note: Pt's pain is being assessed using a 0-10 pain scale. Pt's pain level is currently a 0. Will continue to monitor pt. Problem: Safety - Adult  Goal: Free from fall injury  Outcome: Progressing  Note: Call light in reach at all times, frequent checks, bed in lowest position, wheels of bed and chair locked, non skid footwear on, appropriate transfer techniques, personal items within reach, walkways free of obstructions, fall risk armband and sign displayed, Quiros fall risk score per protocol. No falls this shift, will continue to monitor.

## 2022-05-02 NOTE — PROGRESS NOTES
Writer administered discharge instructions at this time to patient and spouse. Patient denies having questions or concerns. Patient on 2L portable oxygen and has her own oxygen with spouse. Patient will be taken to CT for outpatient scan of lumbar spine and then home after that. Patient to be taken to CT by aide via wheel chair.

## 2022-05-03 ENCOUNTER — CARE COORDINATION (OUTPATIENT)
Dept: CASE MANAGEMENT | Age: 77
End: 2022-05-03

## 2022-05-03 DIAGNOSIS — J18.9 ATYPICAL PNEUMONIA: Primary | ICD-10-CM

## 2022-05-03 PROCEDURE — 1111F DSCHRG MED/CURRENT MED MERGE: CPT | Performed by: INTERNAL MEDICINE

## 2022-05-03 NOTE — CARE COORDINATION
[facility] Transitions Initial Follow Up Call    Call within 2 business days of discharge: Yes    Patient: Zofia Manuel Patient : 1945   MRN: [id number]  Reason for Admission: Multifocal PNE  Discharge Date: 22 RARS: Readmission Risk Score: 18.8 ( )      Last Discharge [facility]       Complaint Diagnosis Description Type Department Provider    22 Shortness of Breath; Fever Multifocal pneumonia ED to Hosp-Admission (Discharged) (ADMITTED) Claudio Murray MD; Sharda Fan. .. Spoke with: Patient    Facility: [facility]    Non-face-to-face services provided:  Scheduled appointment with PCP-5/6  Obtained and reviewed discharge summary and/or continuity of care documents     Spoke with patient who said she is doing better today. She has had PNE several times since December, this is her 5th bout of PNE. She is chronically short of breath, wears O2 @ 2L/min and O2 saturations running 90-92%. She is in the process of changing PCP's and has an appointment with Dr. Lynn Shah this Friday. Discharge instructions reviewed with patient, she has all medications. She also has pain to her back that runs down her leg and she follows with a pain management at [facility], she goes every 2 months for a fill to her pain pump that delivers Dilaudid. Patient also states she has a hiatal hernia and was told that this may be causing some of her issues with PNE d/t aspiration. Medications reviewed, she has ATB and steroids. She denies any assistance at home, lives with spouse and they help each other out. Gave patient my contact information and expressed to call if any additional needs. Transitions of Care Initial Call    Was this an external facility discharge?  [facility]    Challenges to be reviewed by the provider   Additional needs identified to be addressed with provider: No  none             Method of communication with provider : none      Advance Care Planning:   Does patient have an Advance Directive: not on file; education provided. Was this a readmission? No  Patient stated reason for admission: Short of breath  Patients top risk factors for readmission: medical condition-COPD    Care Transition Nurse (CTN) contacted the patient by telephone to perform post hospital discharge assessment. Verified name and  with patient as identifiers. Provided introduction to self, and explanation of the CTN role. CTN reviewed discharge instructions, medical action plan and red flags with patient who verbalized understanding. Patient given an opportunity to ask questions and does not have any further questions or concerns at this time. Were discharge instructions available to patient? Yes. Reviewed appropriate site of care based on symptoms and resources available to patient including: PCP  Specialist. The patient agrees to contact the PCP office for questions related to their healthcare. Medication reconciliation was performed with patient, who verbalizes understanding of administration of home medications. Advised obtaining a 90-day supply of all daily and as-needed medications. Covid Risk Education     Educated patient about risk for severe COVID-19 due to risk factors according to CDC guidelines. CTN reviewed discharge instructions, medical action plan and red flag symptoms with the patient who verbalized understanding. Discussed COVID vaccination status: Yes. Education provided on COVID-19 vaccination as appropriate. Discussed exposure protocols and quarantine with CDC Guidelines. Patient was given an opportunity to verbalize any questions and concerns and agrees to contact CTN or health care provider for questions related to their healthcare. Reviewed and educated patient on any new and changed medications related to discharge diagnosis. Was patient discharged with a pulse oximeter?  No Discussed and confirmed pulse oximeter discharge instructions and when to notify provider or seek emergency care. CTN provided contact information. Plan for follow-up call in 1-2 days based on severity of symptoms and risk factors. Plan for next call: check for any additional needs, check if symptoms are improving, remind of f/u on Friday          Care Transitions 24 Hour Call    Schedule Follow Up Appointment with PCP: Completed  Do you have a copy of your discharge instructions?: Yes  Do you have all of your prescriptions and are they filled?: Yes  Have you been contacted by a [facility] Pharmacist?: No  Have you scheduled your follow up appointment?: Yes  How are you going to get to your appointment?: Car - family or friend to transport  [address] (Comment: [facility])  Do you feel like you have everything you need to keep you well at home?: Yes  Care Transitions Interventions         Follow Up  Future Appointments   Date Time Provider Marietta Leon   5/6/2022  9:15 MD Eusebio Correa C.D. Lisa Grandchild   6/6/2022  8:45 AM [facility]   6/27/2022  2:30 PM MD Eusebio Ernandez C.D.  Lisa Grandchild   9/21/2022 11:00 AM DO [facility] Smith [facility]   2/7/2023 10:00 AM MD SUGEY Diggs CARD [facility]       Rhianna Flores RN

## 2022-05-05 ENCOUNTER — CARE COORDINATION (OUTPATIENT)
Dept: CASE MANAGEMENT | Age: 77
End: 2022-05-05

## 2022-05-05 NOTE — CARE COORDINATION
[facility] Transitions Follow Up Call    2022    Patient: Michael Sanchez  Patient : 1945   MRN: [id number]  Reason for Admission: Multifocal PNE  Discharge Date: 22 RARS: Readmission Risk Score: 18.8 ( )         Spoke with: Patient    Spoke with patient who said she is feeling a little better today. She still has a cough and does get short of breath with activity. She wears O2 @ 2L/min with O2 saturations ranging from 85-95% depending on her activity. She c/o mostly of pain to her back that radiates down her left leg. She has chronic pain and does follow with pain MD at [facility] and has a pain pump that delivers Dilaudid. She gets this filled every 2 months and will go back again in 1 month. She will be seeing Dr. Jazlyn Jo tomorrow for her follow up from the hospital.  She has had multiple bouts of PNE over the past 6 months, she denies any choking or coughing when eating. She reports limited movement at home but denies any need for additional help at home. Expressed I would reach out next week to discuss PCP appointment and if any needs or concerns arise before then to call writer. Care Transitions Follow Up Call    Needs to be reviewed by the provider   Additional needs identified to be addressed with provider: No  none             Method of communication with provider : none      Care Transition Nurse (CTN) contacted the patient by telephone to follow up after admission on . Verified name and  with patient as identifiers. Addressed changes since last contact: none  Discussed follow-up appointments. CTN reviewed discharge instructions, medical action plan and red flags with patient and discussed any barriers to care and/or understanding of plan of care after discharge.  Discussed appropriate site of care based on symptoms and resources available to patient including: PCP  Specialist. The patient agrees to contact the PCP office for questions related to their healthcare. Patients top risk factors for readmission: medical condition-PNE  Interventions to address risk factors: Obtained and reviewed discharge summary and/or continuity of care documents and Education of patient/family/caregiver/guardian to support self-management-discussed PNE recovery, monitoring for s/s of worsening PNE      Non-[facility] follow up appointment(s): n/a    CTN provided contact information for future needs. Plan for follow-up call in 5-7 days based on severity of symptoms and risk factors. Plan for next call: check respiratory status, back pain            Care Transitions Subsequent and Final Call    Schedule Follow Up Appointment with PCP: Completed  Subsequent and Final Calls  Do you have any ongoing symptoms?: Yes  Onset of Patient-reported symptoms: In the past 7 days  Patient-reported symptoms: Pain, Cough, Shortness of Breath  Have your medications changed?: Yes  Do you have any questions related to your medications?: No  Do you currently have any active services?: No  Are you currently active with any services?: Home Health  Do you have any needs or concerns that I can assist you with?: No  Identified Barriers: Lack of Education  Care Transitions Interventions  Other Interventions: Follow Up  Future Appointments   Date Time Provider Department Center   5/6/2022  9:15 AM MD Sojna Corbin C.D.   6/6/2022  8:45 AM Candance Mathieu, MD TIFF PULM [facility]   6/27/2022  2:30 PM MD Sonja Corbin C.D.   9/21/2022 11:00 [facility]   2/7/2023 10:00 AM MD SUGEY Ventura CARD [facility]       Dejah Almanza RN

## 2022-05-11 ENCOUNTER — CARE COORDINATION (OUTPATIENT)
Dept: CASE MANAGEMENT | Age: 77
End: 2022-05-11

## 2022-05-11 NOTE — CARE COORDINATION
[facility] Transitions Follow Up Call    2022    Patient: Anni Holt  Patient : 1945   MRN: [id number]  Reason for Admission: Multifocal PNE  Discharge Date: 22 RARS: Readmission Risk Score: 18.8 ( )         Spoke with: Patient    Spoke briefly with patient who said she was \"hurting pretty bad\" right now, requesting I call back again tomorrow. \        Follow Up  Future Appointments   Date Time Provider Marietta Leon   2022  8:45 AM Judy Hameed MD [facility]   2022  2:30 PM MD Helena Rivera C.D.  [facility]   2022 11:00 AM Lucina Mujica DO [facility]   2023 10:00 AM Sebastian Otero MD [facility]       Claudia Hernandez RN

## 2022-05-12 ENCOUNTER — CARE COORDINATION (OUTPATIENT)
Dept: CASE MANAGEMENT | Age: 77
End: 2022-05-12

## 2022-05-13 ENCOUNTER — CARE COORDINATION (OUTPATIENT)
Dept: CASE MANAGEMENT | Age: 77
End: 2022-05-13

## 2022-05-13 NOTE — CARE COORDINATION
[facility] Transitions Follow Up Call    2022    Patient: Madhu Abdi  Patient : 1945   MRN: <[id number]>  Reason for Admission: Multifocal PNA  Discharge Date: 22 RARS: Readmission Risk Score: 18.8 ( )       Attempt #2 to contact patient for transitions call. Contact information left to  requesting call back at the earliest convenience. CTN sign off if no return call received. Noted pt had HFU with PCP on 22, has Pulm on 22, PCP f/u with labs on 22. Follow Up  Future Appointments   Date Time Provider Marietta Leon   2022  8:45 AM Schuyler Cr MD TIFF PULM [facility]   2022  2:30 PM MD Angel Foster C.D.   2022 11:00 AM Amee Mallory DO TIFF PULM [facility]   2023 10:00 AM Boris Cuadra MD TIFF CARD [facility]       Gina Santana RN

## 2022-06-02 ENCOUNTER — TELEPHONE (OUTPATIENT)
Dept: CARDIOLOGY | Age: 77
End: 2022-06-02

## 2022-06-02 NOTE — TELEPHONE ENCOUNTER
Ms. Willian Lopez 's , Bart Gaytan, called in to report that Kam Robison will be having back injections at the [address] in the near future and they are needing to know how long her Xarelto should be held in prep for this and how soon should it be restarted after the injection. Please advise.

## 2022-06-06 ENCOUNTER — OFFICE VISIT (OUTPATIENT)
Dept: PULMONOLOGY | Age: 77
End: 2022-06-06
Payer: MEDICARE

## 2022-06-06 VITALS
RESPIRATION RATE: 18 BRPM | HEIGHT: 65 IN | BODY MASS INDEX: 25.33 KG/M2 | SYSTOLIC BLOOD PRESSURE: 138 MMHG | DIASTOLIC BLOOD PRESSURE: 81 MMHG | WEIGHT: 152 LBS | OXYGEN SATURATION: 96 % | TEMPERATURE: 97.2 F | HEART RATE: 70 BPM

## 2022-06-06 DIAGNOSIS — K44.9 HIATAL HERNIA: ICD-10-CM

## 2022-06-06 DIAGNOSIS — J69.0 ASPIRATION PNEUMONIA, UNSPECIFIED ASPIRATION PNEUMONIA TYPE, UNSPECIFIED LATERALITY, UNSPECIFIED PART OF LUNG (HCC): Primary | ICD-10-CM

## 2022-06-06 DIAGNOSIS — Z86.16 HISTORY OF COVID-19: ICD-10-CM

## 2022-06-06 DIAGNOSIS — J47.9 BRONCHIECTASIS WITHOUT COMPLICATION (HCC): ICD-10-CM

## 2022-06-06 DIAGNOSIS — K21.9 GASTROESOPHAGEAL REFLUX DISEASE, UNSPECIFIED WHETHER ESOPHAGITIS PRESENT: ICD-10-CM

## 2022-06-06 PROCEDURE — 1123F ACP DISCUSS/DSCN MKR DOCD: CPT | Performed by: INTERNAL MEDICINE

## 2022-06-06 PROCEDURE — G8417 CALC BMI ABV UP PARAM F/U: HCPCS | Performed by: INTERNAL MEDICINE

## 2022-06-06 PROCEDURE — 1090F PRES/ABSN URINE INCON ASSESS: CPT | Performed by: INTERNAL MEDICINE

## 2022-06-06 PROCEDURE — 99214 OFFICE O/P EST MOD 30 MIN: CPT | Performed by: INTERNAL MEDICINE

## 2022-06-06 PROCEDURE — 1036F TOBACCO NON-USER: CPT | Performed by: INTERNAL MEDICINE

## 2022-06-06 PROCEDURE — G8427 DOCREV CUR MEDS BY ELIG CLIN: HCPCS | Performed by: INTERNAL MEDICINE

## 2022-06-06 PROCEDURE — 3017F COLORECTAL CA SCREEN DOC REV: CPT | Performed by: INTERNAL MEDICINE

## 2022-06-06 PROCEDURE — G8400 PT W/DXA NO RESULTS DOC: HCPCS | Performed by: INTERNAL MEDICINE

## 2022-06-06 NOTE — PATIENT INSTRUCTIONS
SURVEY:    You may be receiving a survey from [employer] regarding your visit today. Please complete the survey to enable us to provide the highest quality of care to you and your family. If you cannot score us a very good on any question, please call the office to discuss how we could have made your experience a very good one. Thank you.

## 2022-06-06 NOTE — PROGRESS NOTES
PULMONARY OP  PROGRESS NOTE      Patient:  Ally Heart  YOB: 1945    MRN: [id number]     Acct: [de-identified]       Pt seen and Chart reviewed. Ally Heart is here in followup for   1. Aspiration pneumonia, unspecified aspiration pneumonia type, unspecified laterality, unspecified part of lung ([facility] [address]    2. Gastroesophageal reflux disease, unspecified whether esophagitis present    3. Hiatal hernia    4. Bronchiectasis without complication ([facility] [address]    5. History of COVID-19          Pt  has been hospitalizedand been to er since last visit. She has been diagnosed with pneumonia and treated for such  Has been using meds as recommended. Continues to have cough early in the morning bringing up lots of sputum this yellow-colored  Has occasional streaks of blood to it  No fever or chills or night sweats  Has a good appetite  No orthopnea, PND or increasing pedal edema  No chest pain or pressure  Her medications were reviewed and patient uses albuterol as needed on top of the Anoro. She also uses DuoNeb intermittently.     Subjective:  Review of Systems    Review of Systems -   General ROS: Completed and except as mentioned above were negative   Psychological ROS:  Completed and except as mentioned above were negative  Ophthalmic ROS:  Completed and except as mentioned above were negative  ENT ROS:  Completed and except as mentioned above were negative  Allergy and Immunology ROS:  Completed and except as mentioned above werenegative  Hematological and Lymphatic ROS:  Completed and except as mentioned above were negative  Endocrine ROS: Completed and except as mentioned above were negative  Respiratory ROS:  Completed and except asmentioned above were negative  Cardiovascular ROS:  Completed and except as mentioned above were negative  Gastrointestinal ROS: Completed and except as mentioned above were negative  Genito-Urinary ROS:  Completedand except as mentioned above were negative  Musculoskeletal ROS:  Completed and except as mentioned above were negative  Neurological ROS:  Completed and except as mentioned above were negative  Dermatological ROS:Completed and except as mentioned above were negative    SLEEP  No epistaxis or sore throat. does not have fatigue, tiredness or sleepiness in am.    No MVA. No edema. Allergies: Allergies   Allergen Reactions    Ciprofloxacin      Other reaction(s): AOF    Clarithromycin        Medications:    Current Outpatient Medications:     guaiFENesin-dextromethorphan (ROBITUSSIN DM) 100-10 MG/5ML syrup, Take 5 mLs by mouth every 4 hours (while awake), Disp: 120 mL, Rfl: 0    hydroxychloroquine (PLAQUENIL) 200 MG tablet, Take 300 mg by mouth daily, Disp: , Rfl:     XARELTO 20 MG TABS tablet, TAKE 1 TABLET BY MOUTH  DAILY WITH BREAKFAST, Disp: 90 tablet, Rfl: 3    ipratropium-albuterol (DUONEB) 0.5-2.5 (3) MG/3ML SOLN nebulizer solution, 3 mLs, Disp: , Rfl:     umeclidinium-vilanterol (ANORO ELLIPTA) 62.5-25 MCG/INH AEPB inhaler, Inhale 1 puff into the lungs daily, Disp: 3 each, Rfl: 3    albuterol sulfate  (90 Base) MCG/ACT inhaler, Inhale 2 puffs into the lungs 4 times daily, Disp: 3 each, Rfl: 3    dextrose 5 % SOLN with HYDROmorphone HCl PF 10 MG/ML SOLN 1 mg/mL, Infuse intravenously continuous Pt has continuous dilaudid infusion pump implanted, but is unsure of dosage. 2.797 mg/day, Disp: , Rfl:     furosemide (LASIX) 20 MG tablet, Take 1 tablet by mouth daily (Patient taking differently: Take 20 mg by mouth daily PRN), Disp: 90 tablet, Rfl: 3    HYDROcodone-acetaminophen (NORCO)  MG per tablet, Take 1 tablet by mouth every 6 hours as needed for Pain., Disp: , Rfl:     pregabalin (LYRICA) 300 MG capsule, Take 1 capsule by mouth 2 times daily for 30 days. , Disp: 60 capsule, Rfl: 0    levothyroxine (SYNTHROID) 150 MCG tablet, Take 200 mcg by mouth Daily , Disp: , Rfl:     aspirin 81 MG tablet, Take 81 mg by mouth daily, Disp: , Rfl:    DULoxetine (CYMBALTA) 60 MG extended release capsule, Take 60 mg by mouth daily , Disp: , Rfl:     traZODone (DESYREL) 100 MG tablet, Take 100 mg by mouth nightly 2 tabs at nighttime, Disp: , Rfl:     pantoprazole sodium (PROTONIX) 40 MG PACK packet, Take 40 mg by mouth 2 times daily (before meals), Disp: , Rfl:     potassium chloride (KLOR-CON) 20 MEQ packet, Take 20 mEq by mouth 2 times daily, Disp: , Rfl:     calcium citrate-vitamin D (CITRICAL + D) 315-250 MG-UNIT TABS per tablet, Take 1 tablet by mouth 2 times daily (with meals), Disp: , Rfl:     alendronate (FOSAMAX) 70 MG tablet, Take 70 mg by mouth every 7 days On Sundays, Disp: , Rfl:       Objective:    Physical Exam:  Vitals: /81 (Site: Left Upper Arm, Position: Sitting, Cuff Size: Medium Adult)   Pulse 70   Temp 97.2 °F (36.2 °C) (Temporal)   Resp 18   Ht 5' 5\" (1.651 m)   Wt 152 lb (68.9 kg)   SpO2 96%   BMI 25.29 kg/m²   Last 3 weights: Wt Readings from Last 3 Encounters:   06/06/22 152 lb (68.9 kg)   05/06/22 154 lb 6.4 oz (70 kg)   05/02/22 160 lb (72.6 kg)     Body mass index is 25.29 kg/m². Physical Examination:   PHYSICAL EXAMINATION:    Vitals:    06/06/22 0847   BP: 138/81   Site: Left Upper Arm   Position: Sitting   Cuff Size: Medium Adult   Pulse: 70   Resp: 18   Temp: 97.2 °F (36.2 °C)   TempSrc: Temporal   SpO2: 96%   Weight: 152 lb (68.9 kg)   Height: 5' 5\" (1.651 m)       Constitutional: This is a well developed, well nourished, 25-29.9 - Overweight 77y.o. year old female who is alert, oriented, cooperative and in no apparent distress. Head:normocephalic and atraumatic. EENT:  JP. No conjunctival injections. Septum was midline, mucosa was without erythema, exudates or cobblestoning. No thrush was noted. MallampatiII (soft palate, uvula, fauces visible)  Neck: Supple without thyromegaly. No elevated JVP. Trachea was midline. Respiratory: Chest was symmetrical without dullness to percussion.   Breath sounds bilaterally were clear to auscultation. There were no wheezes, rhonchi or rales. There is no intercostal retraction or use of accessory muscles. No egophony noted. Cardiovascular: Regular without murmur, clicks, gallops or rubs. Abdomen: Slightly rounded and soft without organomegaly. No rebound, rigidity or guarding was appreciated. Lymphatic: No lymphadenopathy. Musculoskeletal: Normal curvature of the spine. No gross muscle weakness. Extremities:  does not have lower extremity edema,  no ulcerations, tenderness, varicosities or erythema. Muscle size, tone and strength are normal.  No involuntary movements are noted. Skin:  Warm and dry. Good color, turgor and pigmentation. No lesions or scars. No cyanosis or clubbing  Neurological/Psychiatric: The patient's general behavior, level of consciousness, thought content and emotional status is normal.          DATA:     Pulmonary function tests: none        CXR: REVIEWED: On April 28, 2022 showed-    Unchanged extensive bilateral interstitial opacities could represent scarring   or fibrosis the patient's known COVID pneumonia     CT scan of the chest was done on 4/28/2022 and it showed-  Moderate multifocal ground-glass opacities consistent with patient's history   of COVID-19, somewhat improved.  Other incidental findings as above.             IMPRESSION:   1. Aspiration pneumonia, unspecified aspiration pneumonia type, unspecified laterality, unspecified part of lung ([address]    2. Gastroesophageal reflux disease, unspecified whether esophagitis present    3. Hiatal hernia    4. Bronchiectasis without complication ([address]    5. History of COVID-19                   PLAN:       In view of the patient's complaints of recurring pneumonia, patient's CT scan of the chest from October 2020, May 2021, January 2022 and April 2022 were reviewed with the patient and her .   I informed her of the changes in the CT scan of April 2022 likely related to her previous pneumonia in October 2020 and scarring related to that  Also informed her that she likely has bronchiectasis as a cause for her early morning sputum  Ordered pulmonary function tests  Refills were provided -none  Patient was recommended to have prednisone and an antibiotic available for use during an exacerbation  Educated and clarified the medication use. Recommended not to use DuoNeb while she is using Anoro  Discussed use, benefit, and side effects of prescribed medications. Barriers to medication compliance addressed. Mar Lines received counseling on the following healthy behaviors: nutrition, exercise and medication adherence  Recommend flu vaccination in the fall annually. Recommendations given regarding pneumococcal vaccinations. Patient had the COVID-19 vaccination  Patient is uptodate with vaccinations from pulmonary perspective. Maintain an active lifestyle. continue smoking cessation. Patient was explained importance of pulmonary rehabilitation with regards to decreasing the hospitalization risk and also help with his dyspnea  Patient was educated on how to use the respiratory medications. All the questions that the patient has had were answered to   her satisfaction. Home O2 evaluation was done. Supplemental oxygen was not indicated   After reviewing the patient's smoking history and her age patient does not meet the criteria for lung cancer screening. We'll see the patient back in 3 months or earlier if needed. Patient will call us if she is sick, so she can be seen sooner. Thank you for having us involved in the care of your patient. Please call us if you have any questions or concerns.           Enid Wilkes MD, MD             6/6/2022, 9:17 AM

## 2022-06-20 ENCOUNTER — HOSPITAL ENCOUNTER (OUTPATIENT)
Age: 77
Discharge: HOME | End: 2022-06-20
Payer: MEDICARE

## 2022-06-20 DIAGNOSIS — R73.01 IMPAIRED FASTING GLUCOSE: ICD-10-CM

## 2022-06-20 DIAGNOSIS — I48.91 NEW ONSET A-FIB (HCC): ICD-10-CM

## 2022-06-20 DIAGNOSIS — E03.9 HYPOTHYROIDISM, UNSPECIFIED TYPE: ICD-10-CM

## 2022-06-20 LAB
ABSOLUTE EOS #: 0.2 K/UL (ref 0–0.44)
ABSOLUTE IMMATURE GRANULOCYTE: <0.03 K/UL (ref 0–0.3)
ABSOLUTE LYMPH #: 1.43 K/UL (ref 1.1–3.7)
ABSOLUTE MONO #: 0.48 K/UL (ref 0.1–1.2)
ALBUMIN SERPL-MCNC: 4.4 G/DL (ref 3.5–5.2)
ALBUMIN/GLOBULIN RATIO: 1.6 (ref 1–2.5)
ALP BLD-CCNC: 73 U/L (ref 35–104)
ALT SERPL-CCNC: 11 U/L (ref 5–33)
ANION GAP SERPL CALCULATED.3IONS-SCNC: 8 MMOL/L (ref 9–17)
AST SERPL-CCNC: 20 U/L
BASOPHILS # BLD: 1 % (ref 0–2)
BASOPHILS ABSOLUTE: 0.03 K/UL (ref 0–0.2)
BILIRUB SERPL-MCNC: 0.36 MG/DL (ref 0.3–1.2)
BUN BLDV-MCNC: 18 MG/DL (ref 8–23)
BUN/CREAT BLD: 28 (ref 9–20)
CALCIUM SERPL-MCNC: 10 MG/DL (ref 8.6–10.4)
CHLORIDE BLD-SCNC: 102 MMOL/L (ref 98–107)
CHOLESTEROL/HDL RATIO: 3.3
CHOLESTEROL: 251 MG/DL
CO2: 31 MMOL/L (ref 20–31)
CREAT SERPL-MCNC: 0.64 MG/DL (ref 0.5–0.9)
EOSINOPHILS RELATIVE PERCENT: 5 % (ref 1–4)
ESTIMATED AVERAGE GLUCOSE: 117 MG/DL
GFR AFRICAN AMERICAN: >60 ML/MIN
GFR NON-AFRICAN AMERICAN: >60 ML/MIN
GFR SERPL CREATININE-BSD FRML MDRD: ABNORMAL ML/MIN/{1.73_M2}
GFR SERPL CREATININE-BSD FRML MDRD: ABNORMAL ML/MIN/{1.73_M2}
GLUCOSE BLD-MCNC: 84 MG/DL (ref 70–99)
HBA1C MFR BLD: 5.7 % (ref 4–6)
HCT VFR BLD CALC: 43.1 % (ref 36.3–47.1)
HDLC SERPL-MCNC: 77 MG/DL
HEMOGLOBIN: 13.5 G/DL (ref 11.9–15.1)
IMMATURE GRANULOCYTES: 0 %
LDL CHOLESTEROL: 158 MG/DL (ref 0–130)
LYMPHOCYTES # BLD: 32 % (ref 24–43)
MCH RBC QN AUTO: 30.2 PG (ref 25.2–33.5)
MCHC RBC AUTO-ENTMCNC: 31.3 G/DL (ref 28.4–34.8)
MCV RBC AUTO: 96.4 FL (ref 82.6–102.9)
MONOCYTES # BLD: 11 % (ref 3–12)
NRBC AUTOMATED: 0 PER 100 WBC
PDW BLD-RTO: 13.4 % (ref 11.8–14.4)
PLATELET # BLD: 206 K/UL (ref 138–453)
PMV BLD AUTO: 10 FL (ref 8.1–13.5)
POTASSIUM SERPL-SCNC: 4 MMOL/L (ref 3.7–5.3)
RBC # BLD: 4.47 M/UL (ref 3.95–5.11)
SEG NEUTROPHILS: 51 % (ref 36–65)
SEGMENTED NEUTROPHILS ABSOLUTE COUNT: 2.32 K/UL (ref 1.5–8.1)
SODIUM BLD-SCNC: 141 MMOL/L (ref 135–144)
T3 FREE: 1.95 PG/ML (ref 2.02–4.43)
THYROXINE, FREE: 0.99 NG/DL (ref 0.93–1.7)
TOTAL PROTEIN: 7.2 G/DL (ref 6.4–8.3)
TRIGL SERPL-MCNC: 79 MG/DL
TSH SERPL DL<=0.05 MIU/L-ACNC: 11.11 UIU/ML (ref 0.3–5)
WBC # BLD: 4.5 K/UL (ref 3.5–11.3)

## 2022-06-20 PROCEDURE — 84481 FREE ASSAY (FT-3): CPT

## 2022-06-20 PROCEDURE — 84443 ASSAY THYROID STIM HORMONE: CPT

## 2022-06-20 PROCEDURE — 80053 COMPREHEN METABOLIC PANEL: CPT

## 2022-06-20 PROCEDURE — 36415 COLL VENOUS BLD VENIPUNCTURE: CPT

## 2022-06-20 PROCEDURE — 83036 HEMOGLOBIN GLYCOSYLATED A1C: CPT

## 2022-06-20 PROCEDURE — 84439 ASSAY OF FREE THYROXINE: CPT

## 2022-06-20 PROCEDURE — 85025 COMPLETE CBC W/AUTO DIFF WBC: CPT

## 2022-06-20 PROCEDURE — 80061 LIPID PANEL: CPT

## 2022-06-27 PROBLEM — J47.1 BRONCHIECTASIS WITH ACUTE EXACERBATION (HCC): Status: RESOLVED | Noted: 2022-04-29 | Resolved: 2022-06-27

## 2022-06-27 PROBLEM — E44.1 MILD MALNUTRITION (HCC): Status: RESOLVED | Noted: 2022-04-29 | Resolved: 2022-06-27

## 2022-06-27 PROBLEM — J18.9 COMMUNITY ACQUIRED PNEUMONIA: Status: RESOLVED | Noted: 2022-01-16 | Resolved: 2022-06-27

## 2022-06-27 PROBLEM — J84.10 PULMONARY FIBROSIS (HCC): Chronic | Status: ACTIVE | Noted: 2022-04-29

## 2022-06-27 PROBLEM — J15.9 COMMUNITY ACQUIRED BACTERIAL PNEUMONIA: Status: RESOLVED | Noted: 2022-04-28 | Resolved: 2022-06-27

## 2022-06-27 PROBLEM — J44.9 COPD (CHRONIC OBSTRUCTIVE PULMONARY DISEASE) (HCC): Chronic | Status: ACTIVE | Noted: 2020-10-22

## 2022-06-27 PROBLEM — J18.9 ATYPICAL PNEUMONIA: Status: RESOLVED | Noted: 2022-02-05 | Resolved: 2022-06-27

## 2022-07-22 ENCOUNTER — HOSPITAL ENCOUNTER (EMERGENCY)
Age: 77
Discharge: HOME | End: 2022-07-22
Attending: EMERGENCY MEDICINE
Payer: MEDICARE

## 2022-07-22 VITALS
OXYGEN SATURATION: 96 % | RESPIRATION RATE: 16 BRPM | BODY MASS INDEX: 24.63 KG/M2 | WEIGHT: 148 LBS | SYSTOLIC BLOOD PRESSURE: 175 MMHG | HEART RATE: 86 BPM | TEMPERATURE: 97.8 F | DIASTOLIC BLOOD PRESSURE: 81 MMHG

## 2022-07-22 DIAGNOSIS — R58 BLEEDING: Primary | ICD-10-CM

## 2022-07-22 PROCEDURE — 99282 EMERGENCY DEPT VISIT SF MDM: CPT

## 2022-07-22 PROCEDURE — 6370000000 HC RX 637 (ALT 250 FOR IP): Performed by: EMERGENCY MEDICINE

## 2022-07-22 RX ADMIN — GELATIN ABSORBABLE SPONGE 12-7 MM 1 EACH: 12-7 MISC at 17:38

## 2022-07-22 ASSESSMENT — PAIN - FUNCTIONAL ASSESSMENT: PAIN_FUNCTIONAL_ASSESSMENT: NONE - DENIES PAIN

## 2022-07-22 NOTE — ED PROVIDER NOTES
Problem Relation Age of Onset    Heart Attack Father 61    Heart Attack Sister     Heart Disease Brother           SOCIAL HISTORY       Social History     Socioeconomic History    Marital status:      Spouse name: None    Number of children: None    Years of education: None    Highest education level: None   Tobacco Use    Smoking status: Former     Packs/day: 1.00     Years: 10.00     Pack years: 10.00     Types: Cigarettes     Quit date: 1976     Years since quittin.7    Smokeless tobacco: Never   Vaping Use    Vaping Use: Never used   Substance and Sexual Activity    Alcohol use: No    Drug use: No     Social Determinants of Health     Financial Resource Strain: Low Risk     Difficulty of Paying Living Expenses: Not hard at all   Food Insecurity: No Food Insecurity    Worried About [place] in the Last Year: Never true    Ran Out of Food in the Last Year: Never true   Physical Activity: Inactive    Days of Exercise per Week: 0 days    Minutes of Exercise per Session: 0 min       SCREENINGS        Kristy Coma Scale  Eye Opening: Spontaneous  Best Verbal Response: Oriented  Best Motor Response: Obeys commands  Kristy Coma Scale Score: 15               PHYSICAL EXAM    (up to 7 for level 4, 8 or more for level 5)     ED Triage Vitals [22 1537]   BP Temp Temp Source Heart Rate Resp SpO2 Height Weight   (!) 175/81 97.8 °F (36.6 °C) Tympanic 86 18 96 % -- 148 lb (67.1 kg)       Physical Exam  Vitals reviewed. Constitutional:       General: She is not in acute distress. Appearance: She is not toxic-appearing. HENT:      Head: Normocephalic. Cardiovascular:      Rate and Rhythm: Normal rate. Pulmonary:      Effort: Pulmonary effort is normal. No respiratory distress. Musculoskeletal:         General: No swelling or tenderness. Skin:     General: Skin is warm and dry. Comments: Small varicose vein anterior left shin not actively bleeding at this time. Bleeding          DISPOSITION/PLAN   DISPOSITION Decision To Discharge 07/22/2022 05:26:25 PM      PATIENT REFERRED TO:  Kin Owen MD  [address] [address]  [phone]      2-3 days    DISCHARGE MEDICATIONS:  Discharge Medication List as of 7/22/2022  5:31 PM        Controlled Substances Monitoring:     RX Monitoring 12/17/2018   Attestation The Prescription Monitoring Report for this patient was reviewed today. Periodic Controlled Substance Monitoring No signs of potential drug abuse or diversion identified.        (Please note that portions of this note were completed with a voice recognition program.  Efforts were made to edit the dictations but occasionally words are mis-transcribed.)    Horace Perez MD (electronically signed)  Attending Emergency Physician             Horace Perez MD  07/24/22 [id number]

## 2022-07-22 NOTE — DISCHARGE INSTRUCTIONS
Change dressings every 24 hours. Apply half a piece of Gelfoam over the wound prior to blood on the dressing. Follow-up with primary care provider in 2 to 3 days for recheck.   Return immediately for any persistent bleeding or any other acute concern

## 2022-08-22 ENCOUNTER — APPOINTMENT (OUTPATIENT)
Dept: CT IMAGING | Age: 77
End: 2022-08-22
Payer: MEDICARE

## 2022-08-22 ENCOUNTER — HOSPITAL ENCOUNTER (EMERGENCY)
Age: 77
Discharge: ANOTHER HOSPITAL | End: 2022-08-22
Payer: MEDICARE

## 2022-08-22 ENCOUNTER — HOSPITAL ENCOUNTER (INPATIENT)
Age: 77
LOS: 8 days | Discharge: NURSING, REHAB OR LONG-TERM CARE | DRG: 025 | End: 2022-08-30
Attending: EMERGENCY MEDICINE | Admitting: SURGERY
Payer: MEDICARE

## 2022-08-22 VITALS
HEART RATE: 84 BPM | BODY MASS INDEX: 24.13 KG/M2 | WEIGHT: 145 LBS | SYSTOLIC BLOOD PRESSURE: 159 MMHG | RESPIRATION RATE: 19 BRPM | TEMPERATURE: 98 F | DIASTOLIC BLOOD PRESSURE: 99 MMHG | OXYGEN SATURATION: 93 %

## 2022-08-22 DIAGNOSIS — I60.9 SUBARACHNOID BLEED (HCC): ICD-10-CM

## 2022-08-22 DIAGNOSIS — S06.5XAA SUBDURAL HEMATOMA: ICD-10-CM

## 2022-08-22 DIAGNOSIS — I62.00 SUBDURAL BLEEDING (HCC): Primary | ICD-10-CM
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
## 2022-08-22 LAB
ABO/RH: NORMAL
ABSOLUTE EOS #: 0.07 K/UL (ref 0–0.44)
ABSOLUTE IMMATURE GRANULOCYTE: <0.03 K/UL (ref 0–0.3)
ABSOLUTE LYMPH #: 1.39 K/UL (ref 1.1–3.7)
ABSOLUTE MONO #: 0.48 K/UL (ref 0.1–1.2)
ANION GAP SERPL CALCULATED.3IONS-SCNC: 10 MMOL/L (ref 9–17)
ANION GAP SERPL CALCULATED.3IONS-SCNC: 7 MMOL/L (ref 9–17)
ANTIBODY SCREEN: NEGATIVE
ARM BAND NUMBER: NORMAL
BASOPHILS # BLD: 1 % (ref 0–2)
BASOPHILS ABSOLUTE: 0.03 K/UL (ref 0–0.2)
BLOOD BANK SPECIMEN: ABNORMAL
BUN BLDV-MCNC: 12 MG/DL (ref 8–23)
BUN BLDV-MCNC: 13 MG/DL (ref 8–23)
BUN/CREAT BLD: 22 (ref 9–20)
CALCIUM SERPL-MCNC: 10 MG/DL (ref 8.6–10.4)
CARBOXYHEMOGLOBIN: 0.8 % (ref 0–5)
CHLORIDE BLD-SCNC: 100 MMOL/L (ref 98–107)
CHLORIDE BLD-SCNC: 101 MMOL/L (ref 98–107)
CO2: 26 MMOL/L (ref 20–31)
CO2: 30 MMOL/L (ref 20–31)
CREAT SERPL-MCNC: 0.56 MG/DL (ref 0.5–0.9)
CREAT SERPL-MCNC: 0.59 MG/DL (ref 0.5–0.9)
EOSINOPHILS RELATIVE PERCENT: 1 % (ref 1–4)
ETHANOL PERCENT: <0.01 %
ETHANOL: <10 MG/DL
EXPIRATION DATE: NORMAL
FIBRINOGEN, FUNCTIONAL TEG: 21.3 MM (ref 15–32)
FIO2: ABNORMAL
GFR AFRICAN AMERICAN: >60 ML/MIN
GFR AFRICAN AMERICAN: >60 ML/MIN
GFR NON-AFRICAN AMERICAN: >60 ML/MIN
GFR NON-AFRICAN AMERICAN: >60 ML/MIN
GFR SERPL CREATININE-BSD FRML MDRD: ABNORMAL ML/MIN/{1.73_M2}
GLUCOSE BLD-MCNC: 104 MG/DL (ref 70–99)
GLUCOSE BLD-MCNC: 105 MG/DL (ref 70–99)
HCG QUALITATIVE: NEGATIVE
HCO3 VENOUS: 26.3 MMOL/L (ref 24–30)
HCT VFR BLD CALC: 41.9 % (ref 36.3–47.1)
HCT VFR BLD CALC: 43.2 % (ref 36.3–47.1)
HEMOGLOBIN: 13.5 G/DL (ref 11.9–15.1)
HEMOGLOBIN: 13.6 G/DL (ref 11.9–15.1)
IMMATURE GRANULOCYTES: 0 %
INR BLD: 1
INR BLD: 1
LY30 (LYSIS) TEG: 0 % (ref 0–2.6)
LYMPHOCYTES # BLD: 22 % (ref 24–43)
MA(MAX CLOT) RAPID TEG: 63.5 MM (ref 52–70)
MCH RBC QN AUTO: 30.9 PG (ref 25.2–33.5)
MCH RBC QN AUTO: 31.5 PG (ref 25.2–33.5)
MCHC RBC AUTO-ENTMCNC: 31.5 G/DL (ref 28.4–34.8)
MCHC RBC AUTO-ENTMCNC: 32.2 G/DL (ref 28.4–34.8)
MCV RBC AUTO: 97.7 FL (ref 82.6–102.9)
MCV RBC AUTO: 98.2 FL (ref 82.6–102.9)
MONOCYTES # BLD: 8 % (ref 3–12)
NRBC AUTOMATED: 0 PER 100 WBC
NRBC AUTOMATED: 0 PER 100 WBC
O2 SAT, VEN: 88.2 % (ref 60–85)
PARTIAL THROMBOPLASTIN TIME: 27.6 SEC (ref 20.5–30.5)
PATIENT TEMP: 37
PCO2, VEN: 41.8 (ref 39–55)
PDW BLD-RTO: 13.7 % (ref 11.8–14.4)
PDW BLD-RTO: 13.8 % (ref 11.8–14.4)
PERFORMING LOCATION: NORMAL
PH VENOUS: 7.42 (ref 7.32–7.42)
PLATELET # BLD: 232 K/UL (ref 138–453)
PLATELET # BLD: 233 K/UL (ref 138–453)
PMV BLD AUTO: 9.3 FL (ref 8.1–13.5)
PMV BLD AUTO: 9.9 FL (ref 8.1–13.5)
PO2, VEN: 56.3 (ref 30–50)
POSITIVE BASE EXCESS, VEN: 2 MMOL/L (ref 0–2)
POTASSIUM SERPL-SCNC: 3.6 MMOL/L (ref 3.7–5.3)
POTASSIUM SERPL-SCNC: 4 MMOL/L (ref 3.7–5.3)
PROTHROMBIN TIME: 10.6 SEC (ref 9.1–12.3)
PROTHROMBIN TIME: 13.2 SEC (ref 11.5–14.2)
RBC # BLD: 4.29 M/UL (ref 3.95–5.11)
RBC # BLD: 4.4 M/UL (ref 3.95–5.11)
REACTION TIME TEG: 6.9 MIN (ref 4.6–9.1)
SEG NEUTROPHILS: 68 % (ref 36–65)
SEGMENTED NEUTROPHILS ABSOLUTE COUNT: 4.23 K/UL (ref 1.5–8.1)
SODIUM BLD-SCNC: 136 MMOL/L (ref 135–144)
SODIUM BLD-SCNC: 138 MMOL/L (ref 135–144)
WBC # BLD: 6.2 K/UL (ref 3.5–11.3)
WBC # BLD: 6.4 K/UL (ref 3.5–11.3)

## 2022-08-22 PROCEDURE — 80048 BASIC METABOLIC PNL TOTAL CA: CPT

## 2022-08-22 PROCEDURE — 2580000003 HC RX 258

## 2022-08-22 PROCEDURE — 82565 ASSAY OF CREATININE: CPT

## 2022-08-22 PROCEDURE — 99285 EMERGENCY DEPT VISIT HI MDM: CPT

## 2022-08-22 PROCEDURE — 2000000000 HC ICU R&B

## 2022-08-22 PROCEDURE — 70450 CT HEAD/BRAIN W/O DYE: CPT

## 2022-08-22 PROCEDURE — 36415 COLL VENOUS BLD VENIPUNCTURE: CPT

## 2022-08-22 PROCEDURE — 85730 THROMBOPLASTIN TIME PARTIAL: CPT

## 2022-08-22 PROCEDURE — G0480 DRUG TEST DEF 1-7 CLASSES: HCPCS

## 2022-08-22 PROCEDURE — 96374 THER/PROPH/DIAG INJ IV PUSH: CPT

## 2022-08-22 PROCEDURE — 84520 ASSAY OF UREA NITROGEN: CPT

## 2022-08-22 PROCEDURE — 85390 FIBRINOLYSINS SCREEN I&R: CPT

## 2022-08-22 PROCEDURE — 6370000000 HC RX 637 (ALT 250 FOR IP)

## 2022-08-22 PROCEDURE — 6360000002 HC RX W HCPCS: Performed by: PHYSICIAN ASSISTANT

## 2022-08-22 PROCEDURE — 90714 TD VACC NO PRESV 7 YRS+ IM: CPT | Performed by: PHYSICIAN ASSISTANT

## 2022-08-22 PROCEDURE — 30283B1 TRANSFUSION OF NONAUTOLOGOUS 4-FACTOR PROTHROMBIN COMPLEX CONCENTRATE INTO VEIN, PERCUTANEOUS APPROACH: ICD-10-PCS | Performed by: SURGERY

## 2022-08-22 PROCEDURE — 6360000002 HC RX W HCPCS

## 2022-08-22 PROCEDURE — 85610 PROTHROMBIN TIME: CPT

## 2022-08-22 PROCEDURE — 85025 COMPLETE CBC W/AUTO DIFF WBC: CPT

## 2022-08-22 PROCEDURE — 82805 BLOOD GASES W/O2 SATURATION: CPT

## 2022-08-22 PROCEDURE — 86850 RBC ANTIBODY SCREEN: CPT

## 2022-08-22 PROCEDURE — 80051 ELECTROLYTE PANEL: CPT

## 2022-08-22 PROCEDURE — 87641 MR-STAPH DNA AMP PROBE: CPT

## 2022-08-22 PROCEDURE — 85576 BLOOD PLATELET AGGREGATION: CPT

## 2022-08-22 PROCEDURE — 6360000002 HC RX W HCPCS: Performed by: STUDENT IN AN ORGANIZED HEALTH CARE EDUCATION/TRAINING PROGRAM

## 2022-08-22 PROCEDURE — 6360000004 HC RX CONTRAST MEDICATION

## 2022-08-22 PROCEDURE — 85027 COMPLETE CBC AUTOMATED: CPT

## 2022-08-22 PROCEDURE — 85384 FIBRINOGEN ACTIVITY: CPT

## 2022-08-22 PROCEDURE — 90471 IMMUNIZATION ADMIN: CPT | Performed by: PHYSICIAN ASSISTANT

## 2022-08-22 PROCEDURE — 2580000003 HC RX 258: Performed by: STUDENT IN AN ORGANIZED HEALTH CARE EDUCATION/TRAINING PROGRAM

## 2022-08-22 PROCEDURE — 2500000003 HC RX 250 WO HCPCS: Performed by: PHYSICIAN ASSISTANT

## 2022-08-22 PROCEDURE — 85347 COAGULATION TIME ACTIVATED: CPT

## 2022-08-22 PROCEDURE — 86901 BLOOD TYPING SEROLOGIC RH(D): CPT

## 2022-08-22 PROCEDURE — 12011 RPR F/E/E/N/L/M 2.5 CM/<: CPT

## 2022-08-22 PROCEDURE — 82947 ASSAY GLUCOSE BLOOD QUANT: CPT

## 2022-08-22 PROCEDURE — 86900 BLOOD TYPING SEROLOGIC ABO: CPT

## 2022-08-22 PROCEDURE — 93005 ELECTROCARDIOGRAM TRACING: CPT

## 2022-08-22 PROCEDURE — 6370000000 HC RX 637 (ALT 250 FOR IP): Performed by: PHYSICIAN ASSISTANT

## 2022-08-22 PROCEDURE — 72125 CT NECK SPINE W/O DYE: CPT

## 2022-08-22 PROCEDURE — 71260 CT THORAX DX C+: CPT

## 2022-08-22 PROCEDURE — 84703 CHORIONIC GONADOTROPIN ASSAY: CPT

## 2022-08-22 RX ORDER — FAMOTIDINE 20 MG/1
20 TABLET, FILM COATED ORAL 2 TIMES DAILY
Status: DISCONTINUED | OUTPATIENT
Start: 2022-08-22 | End: 2022-08-23

## 2022-08-22 RX ORDER — SODIUM CHLORIDE 0.9 % (FLUSH) 0.9 %
5-40 SYRINGE (ML) INJECTION EVERY 12 HOURS SCHEDULED
Status: DISCONTINUED | OUTPATIENT
Start: 2022-08-22 | End: 2022-08-24

## 2022-08-22 RX ORDER — SODIUM CHLORIDE 9 MG/ML
50 INJECTION, SOLUTION INTRAVENOUS ONCE
Status: COMPLETED | OUTPATIENT
Start: 2022-08-22 | End: 2022-08-22

## 2022-08-22 RX ORDER — SODIUM CHLORIDE 9 MG/ML
INJECTION, SOLUTION INTRAVENOUS PRN
Status: DISCONTINUED | OUTPATIENT
Start: 2022-08-22 | End: 2022-08-29

## 2022-08-22 RX ORDER — OXYCODONE HYDROCHLORIDE 5 MG/1
2.5 TABLET ORAL EVERY 6 HOURS PRN
Status: DISCONTINUED | OUTPATIENT
Start: 2022-08-22 | End: 2022-08-23

## 2022-08-22 RX ORDER — LEVETIRACETAM 10 MG/ML
1000 INJECTION INTRAVASCULAR ONCE
Status: COMPLETED | OUTPATIENT
Start: 2022-08-22 | End: 2022-08-22

## 2022-08-22 RX ORDER — ACETAMINOPHEN 500 MG
1000 TABLET ORAL EVERY 8 HOURS SCHEDULED
Status: DISCONTINUED | OUTPATIENT
Start: 2022-08-22 | End: 2022-08-23 | Stop reason: SDUPTHER

## 2022-08-22 RX ORDER — SODIUM CHLORIDE 0.9 % (FLUSH) 0.9 %
5-40 SYRINGE (ML) INJECTION PRN
Status: DISCONTINUED | OUTPATIENT
Start: 2022-08-22 | End: 2022-08-25

## 2022-08-22 RX ORDER — ONDANSETRON 2 MG/ML
4 INJECTION INTRAMUSCULAR; INTRAVENOUS EVERY 6 HOURS PRN
Status: DISCONTINUED | OUTPATIENT
Start: 2022-08-22 | End: 2022-08-29

## 2022-08-22 RX ORDER — POLYETHYLENE GLYCOL 3350 17 G/17G
17 POWDER, FOR SOLUTION ORAL DAILY
Status: DISCONTINUED | OUTPATIENT
Start: 2022-08-22 | End: 2022-08-30 | Stop reason: HOSPADM

## 2022-08-22 RX ORDER — LIDOCAINE HYDROCHLORIDE AND EPINEPHRINE 10; 10 MG/ML; UG/ML
20 INJECTION, SOLUTION INFILTRATION; PERINEURAL ONCE
Status: COMPLETED | OUTPATIENT
Start: 2022-08-22 | End: 2022-08-22

## 2022-08-22 RX ORDER — ONDANSETRON 4 MG/1
4 TABLET, ORALLY DISINTEGRATING ORAL EVERY 8 HOURS PRN
Status: DISCONTINUED | OUTPATIENT
Start: 2022-08-22 | End: 2022-08-23

## 2022-08-22 RX ORDER — LEVETIRACETAM 5 MG/ML
500 INJECTION INTRAVASCULAR EVERY 12 HOURS
Status: DISCONTINUED | OUTPATIENT
Start: 2022-08-22 | End: 2022-08-24

## 2022-08-22 RX ORDER — SODIUM CHLORIDE 9 MG/ML
INJECTION, SOLUTION INTRAVENOUS CONTINUOUS
Status: DISCONTINUED | OUTPATIENT
Start: 2022-08-22 | End: 2022-08-28

## 2022-08-22 RX ORDER — MIDAZOLAM HYDROCHLORIDE 2 MG/2ML
0.5 INJECTION, SOLUTION INTRAMUSCULAR; INTRAVENOUS ONCE
Status: COMPLETED | OUTPATIENT
Start: 2022-08-22 | End: 2022-08-22

## 2022-08-22 RX ADMIN — ONDANSETRON 4 MG: 2 INJECTION INTRAMUSCULAR; INTRAVENOUS at 21:45

## 2022-08-22 RX ADMIN — Medication 3 ML: at 17:25

## 2022-08-22 RX ADMIN — IOPAMIDOL 75 ML: 755 INJECTION, SOLUTION INTRAVENOUS at 17:59

## 2022-08-22 RX ADMIN — FAMOTIDINE 20 MG: 20 TABLET, FILM COATED ORAL at 20:56

## 2022-08-22 RX ADMIN — SODIUM CHLORIDE: 9 INJECTION, SOLUTION INTRAVENOUS at 21:01

## 2022-08-22 RX ADMIN — DESMOPRESSIN ACETATE 26.32 MCG: 4 SOLUTION INTRAVENOUS at 22:29

## 2022-08-22 RX ADMIN — ACETAMINOPHEN 1000 MG: 500 TABLET ORAL at 20:56

## 2022-08-22 RX ADMIN — SODIUM CHLORIDE 50 ML: 9 INJECTION, SOLUTION INTRAVENOUS at 22:13

## 2022-08-22 RX ADMIN — PROTHROMBIN, COAGULATION FACTOR VII HUMAN, COAGULATION FACTOR IX HUMAN, COAGULATION FACTOR X HUMAN, PROTEIN C, PROTEIN S HUMAN, AND WATER 2000 UNITS: KIT at 21:59

## 2022-08-22 RX ADMIN — OXYCODONE 2.5 MG: 5 TABLET ORAL at 21:48

## 2022-08-22 RX ADMIN — MIDAZOLAM 0.5 MG: 1 INJECTION INTRAMUSCULAR; INTRAVENOUS at 18:22

## 2022-08-22 RX ADMIN — SODIUM CHLORIDE, PRESERVATIVE FREE 10 ML: 5 INJECTION INTRAVENOUS at 22:04

## 2022-08-22 RX ADMIN — LIDOCAINE HYDROCHLORIDE AND EPINEPHRINE 20 ML: 10; 10 INJECTION, SOLUTION INFILTRATION; PERINEURAL at 18:26

## 2022-08-22 RX ADMIN — LEVETIRACETAM 500 MG: 5 INJECTION INTRAVENOUS at 21:06

## 2022-08-22 RX ADMIN — CLOSTRIDIUM TETANI TOXOID ANTIGEN (FORMALDEHYDE INACTIVATED) AND CORYNEBACTERIUM DIPHTHERIAE TOXOID ANTIGEN (FORMALDEHYDE INACTIVATED) 0.5 ML: 5; 2 INJECTION, SUSPENSION INTRAMUSCULAR at 17:23

## 2022-08-22 RX ADMIN — LEVETIRACETAM 1000 MG: 10 INJECTION INTRAVENOUS at 19:20

## 2022-08-22 ASSESSMENT — PAIN SCALES - GENERAL
PAINLEVEL_OUTOF10: 7
PAINLEVEL_OUTOF10: 9
PAINLEVEL_OUTOF10: 3
PAINLEVEL_OUTOF10: 9

## 2022-08-22 ASSESSMENT — PAIN - FUNCTIONAL ASSESSMENT: PAIN_FUNCTIONAL_ASSESSMENT: NONE - DENIES PAIN

## 2022-08-22 NOTE — H&P
TRAUMA HISTORY AND PHYSICAL EXAMINATION    PATIENT NAME: Malissa Azul  YOB: 1945  MEDICAL RECORD NO. [id number]   DATE: 8/22/2022  PRIMARY CARE PHYSICIAN: Paul Penn MD  PATIENT EVALUATED AT THE REQUEST OF : Lisa    ACTIVATION   []Trauma Alert     [x] Trauma Priority     []Trauma Consult. IMPRESSION:     Patient Active Problem List   Diagnosis    Hypothyroidism    Major depressive disorder    Chronic midline low back pain without sciatica    Iron deficiency anemia    COPD (chronic obstructive pulmonary disease) (HCC)    Biventricular congestive heart failure (HCC)    Anemia    New onset a-fib (HCC)    Pulmonary fibrosis (HCC)    SDH (subdural hematoma) ([facility] [address]       MEDICAL DECISION MAKING AND PLAN:     Neurosurgery consult  CT Recons or T and L spine        CONSULT SERVICES    [x] Neurosurgery     [] Orthopedic Surgery    [] Cardiothoracic     [] Facial Trauma    [] Plastic Surgery (Burn)    [] Pediatric Surgery     [] Internal Medicine    [] Pulmonary Medicine    [] Other:        HISTORY:     Chief Complaint:  \"Fall\"    INJURY SUMMARY  18mm L side SDH     GENERAL DATA  Age 69 y.o.  female   Patient information was obtained from patient and past medical records. History/Exam limitations: none. Patient presented to the Emergency Department by ambulance where the patient received see Ambulance Run Sheet prior to arrival.  Injury Date: 8/22/2022   Approximate Injury Time: 400pm        Transport mode:   []Ambulance      [x] Helicopter     []Car       [] Other  Referring Hospital: [address] [place] [address]    MECHANISM OF INJURY            [x] Fall    From Standing     []From Height  Ft     []Down Stairs ___steps    HISTORY:     Malissa Azul is a 69 y.o. female that presented to the Emergency Department following a fall that occurred at home. Patient was sitting in her back yard and stood up to walk away.  She then lost balance and fell forward, striking her face on the MCG/INH AEPB inhaler Inhale 1 puff into the lungs daily 3/16/22   Yash Delvalle APRN - CNP   albuterol sulfate  (90 Base) MCG/ACT inhaler Inhale 2 puffs into the lungs 4 times daily 3/16/22   aYsh Delvalle APRN - CNP   dextrose 5 % SOLN with HYDROmorphone HCl PF 10 MG/ML SOLN 1 mg/mL Infuse intravenously continuous Pt has continuous dilaudid infusion pump implanted, but is unsure of dosage. 2.797 mg/day    Historical Provider, MD   furosemide (LASIX) 20 MG tablet Take 1 tablet by mouth daily  Patient taking differently: Take 20 mg by mouth daily PRN 5/14/21   Shoshana Helms MD   HYDROcodone-acetaminophen [facility])  MG per tablet Take 1 tablet by mouth every 6 hours as needed for Pain. Historical Provider, MD   pregabalin (LYRICA) 300 MG capsule Take 1 capsule by mouth 2 times daily for 30 days. 11/6/20 8/22/22  Windy Ross MD   aspirin 81 MG tablet Take 81 mg by mouth daily    Historical Provider, MD   DULoxetine (CYMBALTA) 60 MG extended release capsule Take 60 mg by mouth daily     Historical Provider, MD   potassium chloride (KLOR-CON) 20 MEQ packet Take 20 mEq by mouth 2 times daily    Historical Provider, MD   calcium citrate-vitamin D (CITRICAL + D) 315-250 MG-UNIT TABS per tablet Take 1 tablet by mouth 2 times daily (with meals)    Historical Provider, MD       ALLERGIES:   []  None    []   Information not available due to exam limitations documented above   Patient has no known allergies. PAST MEDICAL HISTORY: []  None   []   Information not available due to exam limitations documented above    has a past medical history of Bronchiectasis with acute exacerbation ([address] Chronic pain syndrome, COPD (chronic obstructive pulmonary disease) ([address] Depression, GERD (gastroesophageal reflux disease), Hypothyroidism, Impaired fasting glucose, Osteoporosis, and Pulmonary fibrosis ([address] has a past surgical history that includes Hysterectomy (08/20/1991);  Carpal tunnel release (Right, 12/17/1999); Total knee arthroplasty (Right, 03/19/2021); fracture surgery (Left, 12/20/2018); Wrist fracture surgery (Left, 12/20/2018); lumbar discectomy (08/30/1993); Lumbar disc surgery (02/15/1994); back surgery (09/09/1998); back surgery (08/04/1999); Carpal tunnel release (Left, 11/24/1999); Neck surgery (05/03/2002); Carpal tunnel release (Right, 12/30/2002); Carpal tunnel release (Left, 12/17/2003); back surgery (10/23/2003); back surgery (10/23/2003); Cervical disc surgery (10/25/2004); Cervical disc surgery (12/22/2004); Neck surgery (12/09/2005); Toe amputation (10/08/2006); Toe amputation (03/07/2008); lumbar laminectomy (06/09/2008); Toe amputation (10/03/2008); Humerus fracture surgery (Right, 10/03/2010); Knee arthroscopy (Right, 06/02/2011); back surgery (09/11/2017); knee surgery (Right, 02/04/2016); Wrist fracture surgery (Left, 12/20/2018); and Wrist surgery (Left, 07/02/2019). FAMILY HISTORY   []   Information not available due to exam limitations documented above    family history includes Heart Attack in her sister; Heart Attack (age of onset: 61) in her father; Heart Disease in her brother. SOCIAL HISTORY  []   Information not available due to exam limitations documented above     reports that she quit smoking about 45 years ago. Her smoking use included cigarettes. She has a 10.00 pack-year smoking history. She has never used smokeless tobacco.   reports no history of alcohol use. reports no history of drug use. PERTINENT SYSTEMIC REVIEW:    []   Information not available due to exam limitations documented above    Pertinent items are noted in HPI.     PHYSICAL EXAMINATION:     GLASCOW COMA SCALE  NEUROMUSCULAR BLOCKADE PRIOR TO ARRIVAL     [x]No        []Yes      Variable  Score   Variable  Score  Eye opening [x]Spontaneous 4 Verbal  [x]Oriented  5     []To voice  3   []Confused  4    []To pain  2   []Inapp words  3    []None  1   []Incomp words 2       []None  1   Motor [x]Obeys  6    []Localizes pain 5    []Withdraws(pain) 4    []Flexion(pain) 3  []Extension(pain) 2    []None  1     GCS Total = 15    PHYSICAL EXAMINATION    VITAL SIGNS:   Vitals:    08/22/22 1917   BP: (!) 138/96   Pulse: 75   Resp: 13   Temp:    SpO2: 92%       General Appearance: alert and oriented to person, place and time, well developed and well- nourished, in no acute distress  Skin: warm and dry, no rash or erythema  Head: normocephalic, right periorbital bruising  Eyes: pupils equal, round, and reactive to light, extraocular eye movements intact, conjunctivae normal  ENT: tympanic membrane, external ear and ear canal normal bilaterally, nose without deformity, nasal mucosa and turbinates normal without polyps  Neck: supple and non-tender without mass, no thyromegaly or thyroid nodules, no cervical lymphadenopathy  Pulmonary/Chest: clear to auscultation bilaterally- no wheezes, rales or rhonchi, normal air movement, no respiratory distress  Cardiovascular: normal rate, regular rhythm, normal S1 and S2, no murmurs, rubs, clicks, or gallops, distal pulses intact, no carotid bruits  Abdomen: soft, non-tender, non-distended  Extremities: no cyanosis, clubbing or edema, mild erythema bilaterally in lower extremities. intermittent excoriations to lower extremities bilatearlly  Musculoskeletal: normal range of motion, no joint swelling, deformity or tenderness  Neurologic: reflexes normal and symmetric, no cranial nerve deficit,  coordination and speech normal     FOCUSED ABDOMINAL SONOGRAM FOR TRAUMA (FAST): A good  quality examination was performed by Dr. Elias Kilpatrick  and representative images were obtained.     [x] No free fluid in the abdomen   [] Free fluid in RUQ   [] Free fluid in LUQ  [] Free fluid in Pelvis  [] Pericardial fluid  [] Other:        RADIOLOGY  No orders to display         LABS    Labs Reviewed   TRAUMA PANEL - Abnormal; Notable for the following components:       Result Value    Glucose 105 (*) Potassium 3.6 (*)     pO2, Micheal 56.3 (*)     O2 Sat, Micheal 88.2 (*)     All other components within normal limits   TEG GLOBAL HEMOSTASIS WITH LYSIS   URINE DRUG SCREEN   URINALYSIS   TYPE AND SCREEN         Sheryle Ege, MD  8/22/22, 8:15 PM        Attending Note    Patient seen as a priority transfer from [facility] after fall from standing height striking head. She was found to have a large left SDH. NS consulted  I have reviewed the above TECSS note(s) and I either performed the key elements of the medical history and physical exam or was present with the resident when the key elements of the medical history and physical exam were performed. I have discussed the findings, established the care plan and recommendations with Resident [address] and Anish Reaves.     Yissel Crooks MD  8/22/2022  11:26 PM

## 2022-08-22 NOTE — ED NOTES
Blood collected from L AC IV by [facility] RN and handed to .       Kevin Moore RN  08/22/22 [id number]

## 2022-08-22 NOTE — ED PROVIDER NOTES
[facility] ED  eMERGENCY dEPARTMENT eNCOUnter      Pt Name: Naman Beavers  MRN: [id number]  [place] 1945  Date of evaluation: 8/22/2022  Provider: Queenie Andrea PA-C    CHIEF COMPLAINT       Chief Complaint   Patient presents with    Head Laceration     Patient fell and hit head on concrete around 1100 today. No loc. She is on a blood thinner. HISTORY OF PRESENT ILLNESS  (Location/Symptom, Timing/Onset, Context/Setting, Quality, Duration, Modifying Factors, Severity.)   Naman Beavers is a 69 y.o. female who presents to the emergency department with her  with the complaints of laceration above her left eyebrow. She states she was at home and lost her footing falling down landing on her left side of her face and hurting her left ribs. She denies any loss of consciousness, she denies any nausea or vomiting or any double or blurry vision. Patient is on Xarelto      Nursing Notes were reviewed. REVIEW OF SYSTEMS    (2-9 systems for level 4, 10 or more for level 5)     Review of Systems   Left eyebrow laceration  Complaining of mild headache  Denies nausea vomiting fevers or chills  Complaining of left rib pain  Denies any other complaint    Except as noted above the remainder of the review of systems was reviewed and negative.        PAST MEDICAL HISTORY     Past Medical History:   Diagnosis Date    A-fib [facility])     Biventricular congestive heart failure (HCC)     Bronchiectasis with acute exacerbation ([address] 04/29/2022    CAD (coronary artery disease)     Chronic pain syndrome     dilaudid infusion pump    COPD (chronic obstructive pulmonary disease) (HCC)     Depression     GERD (gastroesophageal reflux disease)     Hypothyroidism     Impaired fasting glucose     Iron deficiency anemia     Osteoporosis     Pulmonary fibrosis ([address] 04/29/2022    Subdural hematoma ([address] 08/23/2022     None otherwise stated in nurses notes    SURGICAL HISTORY       Past Surgical History:   Procedure Laterality Date    BACK SURGERY  09/09/1998    spinal cord stimulator    BACK SURGERY  08/04/1999    infusion pump insertion    BACK SURGERY  10/23/2003    spinal cord stimulator removed    BACK SURGERY  10/23/2003    new infusion pump placed    BACK SURGERY  09/11/2017    replaced infusion pump    CARPAL TUNNEL RELEASE Right 12/17/1999    CARPAL TUNNEL RELEASE Left 11/24/1999    CARPAL TUNNEL RELEASE Right 12/30/2002    CARPAL TUNNEL RELEASE Left 12/17/2003    CERVICAL [address] SURGERY  10/25/2004    anterior cervical diskectomy C7-T1    CERVICAL DISC SURGERY  12/22/2004    anterior cervical discectomy Y9-2 (complicated by damaged nerve to vocal cord)    CRANIOTOMY Left 8/23/2022    LEFT CRANIOTOMY FOR SUBDURAL HEMATOMA EVACUATION performed by Stanley Pimentel DO at [address] [place] [address] Left 12/20/2018    ORIF wrist    HUMERUS FRACTURE SURGERY Right 10/03/2010    HYSTERECTOMY (CERVIX STATUS UNKNOWN)  08/20/1991    KNEE ARTHROSCOPY Right 06/02/2011    KNEE SURGERY Right 02/04/2016    repair distal portion of knee arthroplasty due to prosthesis loosening    LUMBAR [address] SURGERY  02/15/1994    due to scar tissue from previous surgery    LUMBAR DISCECTOMY  08/30/1993    L5-S1    LUMBAR LAMINECTOMY  06/09/2008    L3-4-5    NECK SURGERY  05/03/2002    posterior plate fusion    NECK SURGERY  12/09/2005    reconnect nerve to vocal cord [facility]    TOE AMPUTATION  10/08/2006    left 3rd toe - osteomyelitis    TOE AMPUTATION  03/07/2008    left 4th toe partial amputation    TOE AMPUTATION  10/03/2008    left 2nd toe    TOTAL KNEE ARTHROPLASTY Right 03/19/2021    WRIST FRACTURE SURGERY Left 12/20/2018    WRIST OPEN REDUCTION INTERNAL FIXATION-DISTAL RADIUS performed by Ed Bill MD at [facility] Left 12/20/2018    WRIST SURGERY Left 07/02/2019    left wrist ulnar shortening osteotomy     None otherwise stated in nurses notes    [address]       Discharge Medication List as of 8/22/2022  6:43 PM        CONTINUE these medications which have NOT CHANGED    Details   mupirocin (BACTROBAN) 2 % cream Apply 3 times daily. , Disp-1 each, R-0, Normal      alendronate (FOSAMAX) 70 MG tablet Take 1 tablet by mouth every 7 days On Sundays, Disp-12 tablet, R-3Normal      pantoprazole sodium (PROTONIX) 40 MG PACK packet Take 1 packet by mouth in the morning and 1 packet in the evening. Take before meals. , Disp-180 each, R-3Normal      sulfamethoxazole-trimethoprim (BACTRIM DS) 800-160 MG per tablet Take 1 tablet by mouth in the morning and 1 tablet before bedtime. Do all this for 10 days. , Disp-20 tablet, R-0Normal      traZODone (DESYREL) 100 MG tablet Take 1 tablet by mouth nightly 2 tabs at nighttime, Disp-90 tablet, R-1Normal      sertraline (ZOLOFT) 50 MG tablet Take 1 tablet by mouth daily, Disp-30 tablet, R-5Normal      levothyroxine (SYNTHROID) 200 MCG tablet Take 1 tablet by mouth Daily, Disp-90 tablet, R-3Normal      guaiFENesin-dextromethorphan (ROBITUSSIN DM) 100-10 MG/5ML syrup Take 5 mLs by mouth every 4 hours (while awake), Disp-120 mL, R-0Normal      hydroxychloroquine (PLAQUENIL) 200 MG tablet Take 300 mg by mouth dailyHistorical Med      XARELTO 20 MG TABS tablet TAKE 1 TABLET BY MOUTH  DAILY WITH BREAKFAST, Disp-90 tablet, R-3Requesting 1 year supplyNormal      ipratropium-albuterol (DUONEB) 0.5-2.5 (3) MG/3ML SOLN nebulizer solution 3 mLsHistorical Med      umeclidinium-vilanterol (ANORO ELLIPTA) 62.5-25 MCG/INH AEPB inhaler Inhale 1 puff into the lungs daily, Disp-3 each, R-3Normal      albuterol sulfate  (90 Base) MCG/ACT inhaler Inhale 2 puffs into the lungs 4 times daily, Disp-3 each, R-3Normal      dextrose 5 % SOLN with HYDROmorphone HCl PF 10 MG/ML SOLN 1 mg/mL Infuse intravenously continuous Pt has continuous dilaudid infusion pump implanted, but is unsure of dosage.  2.797 mg/dayHistorical Med      furosemide (LASIX) 20 MG tablet Take 1 tablet by mouth daily, Disp-90 tablet, R-3Normal      HYDROcodone-acetaminophen (NORCO)  MG per tablet Take 1 tablet by mouth every 6 hours as needed for Pain. Historical Med      pregabalin (LYRICA) 300 MG capsule Take 1 capsule by mouth 2 times daily for 30 days. , Disp-60 capsule, R-0Print      aspirin 81 MG tablet Take 81 mg by mouth dailyHistorical Med      DULoxetine (CYMBALTA) 60 MG extended release capsule Take 60 mg by mouth daily Historical Med      potassium chloride (KLOR-CON) 20 MEQ packet Take 20 mEq by mouth 2 times dailyHistorical Med      calcium citrate-vitamin D (CITRICAL + D) 315-250 MG-UNIT TABS per tablet Take 1 tablet by mouth 2 times daily (with meals)Historical Med             ALLERGIES     Patient has no known allergies. FAMILY HISTORY           Problem Relation Age of Onset    Heart Attack Father 61    Heart Attack Sister     Heart Disease Brother      Family Status   Relation Name Status    Mother      Father      Sister      Brother  Alive    Sister  Alive    Sister  Alive    Sister  Alive    Brother  Alive    Brother      Sister  Alive      None otherwise stated in nurses notes    SOCIAL HISTORY      reports that she quit smoking about 45 years ago. Her smoking use included cigarettes. She has a 10.00 pack-year smoking history. She has never used smokeless tobacco. She reports that she does not drink alcohol and does not use drugs. lives at home with others     PHYSICAL EXAM    (up to 7 for level 4, 8 or more for level 5)     ED Triage Vitals   BP Temp Temp Source Heart Rate Resp SpO2 Height Weight   22 1601 22 1601 22 1601 22 1601 22 1601 22 1601 -- 22 1746   (!) 160/81 98 °F (36.7 °C) Tympanic 79 14 95 %  145 lb (65.8 kg)       Physical Exam   Nursing note and vitals reviewed. Constitutional: Oriented to person, place, and time and well-developed, well-nourished. Head: Normocephalic and atraumatic.    Ear: Dilation of the common bile duct without obstructing stone or lesion   identified, nonspecific. 6. Moderate hiatal hernia. CT HEAD WO CONTRAST   Final Result   Left holo-hemispheric subdural hematoma with a maximum depth of approximately   18 mm. Suggestion of mild subarachnoid hemorrhage. Critical results were called by Dr. Christopher Clark MD to Dr. Rosales Mohamud   on 8/22/2022 at 17:26.             CT HEAD WO CONTRAST    Result Date: 8/22/2022  EXAMINATION: CT OF THE HEAD WITHOUT CONTRAST  8/22/2022 2:02 pm TECHNIQUE: CT of the head was performed without the administration of intravenous contrast. Automated exposure control, iterative reconstruction, and/or weight based adjustment of the mA/kV was utilized to reduce the radiation dose to as low as reasonably achievable. COMPARISON: None. HISTORY: ORDERING SYSTEM PROVIDED HISTORY: fall TECHNOLOGIST PROVIDED HISTORY: fall Decision Support Exception - unselect if not a suspected or confirmed emergency medical condition->Emergency Medical Condition (MA) FINDINGS: BRAIN/VENTRICLES: Left holo-hemispheric subdural hematoma with a maximum depth of approximately 18 mm. Mass effect on the underlying cerebral parenchyma with effacement of the sulci. Mild shift of the midline from left to right approximately 5 mm. Suggestion of mild subarachnoid hemorrhage. The gray-white differentiation is otherwise maintained without evidence of an acute infarct. There is no evidence of hydrocephalus. Cerebellum and brainstem are within normal limits. ORBITS: The visualized portion of the orbits demonstrate no acute abnormality. SINUSES: The visualized paranasal sinuses and mastoid air cells demonstrate no acute abnormality. SOFT TISSUES/SKULL:  No acute abnormality of the visualized skull or soft tissues. Left holo-hemispheric subdural hematoma with a maximum depth of approximately 18 mm. Suggestion of mild subarachnoid hemorrhage.  Critical results were called by Dr. Dax Huang MD to Dr. Judy Castillo on 8/22/2022 at 17:26. LABS:  Labs Reviewed   CBC WITH AUTO DIFFERENTIAL - Abnormal; Notable for the following components:       Result Value    Seg Neutrophils 68 (*)     Lymphocytes 22 (*)     All other components within normal limits   BASIC METABOLIC PANEL - Abnormal; Notable for the following components:    Glucose 104 (*)     Bun/Cre Ratio 22 (*)     Anion Gap 7 (*)     All other components within normal limits   PROTIME-INR       All other labs were within normal range or not returned as of this dictation. EMERGENCY DEPARTMENT COURSE and DIFFERENTIAL DIAGNOSIS/MDM:   Vitals:    Vitals:    08/22/22 1801 08/22/22 1816 08/22/22 1821 08/22/22 1830   BP: (!) 173/90 (!) 176/71 (!) 159/99    Pulse: 85 84 84    Resp: 13 17 19    Temp:       TempSrc:       SpO2: 92% 92%  93%   Weight:           Radiologist contacted attending physician Dr. Todd Londono who informed on the subdural and possible subarachnoid hemorrhage. [facility] was contacted, Dr. Kait Okeefe, ER attending, accepts transfer    [facility] was contacted    Laceration Repair Procedure Note  Indication: Laceration    Procedure: The patient was placed in the appropriate position and anesthesia around the laceration was obtained by infiltration using 1% Lidocaine with epinephrine. The area was then cleansed with betadine and draped in a sterile fashion. The laceration was closed with 4-0 Prolene using interrupted sutures. There were no additional lacerations requiring repair. The wound area was then dressed with a sterile dressing. Total repaired wound length: 1 cm. Count: None    The patient tolerated the procedure well.     Complications: None        Patient was very nervous and shaking due to [facility] and going in a helicopter, 0.5 mg of Versed was given and [facility] nurse and medic were informed will agree with this medication          ED MEDS:  Orders Placed This Encounter Medications    lidocaine-EPINEPHrine-tetracaine (LET) topical solution 3 mL syringe    tetanus & diphtheria toxoids (adult) 5-2 LFU injection 0.5 mL    lidocaine-EPINEPHrine 1 %-1:176616 injection 20 mL    iopamidol (ISOVUE-370) 76 % injection 75 mL    midazolam PF (VERSED) injection 0.5 mg           FINAL IMPRESSION      1. Subdural bleeding (HCC)    2. Subarachnoid bleed (HCC)          DISPOSITION/PLAN   DISPOSITION Decision To Transfer        Summation      Patient Course:      ED Medications administered this visit:    Medications   lidocaine-EPINEPHrine-tetracaine (LET) topical solution 3 mL syringe (3 mLs Topical Given 8/22/22 1725)   tetanus & diphtheria toxoids (adult) 5-2 LFU injection 0.5 mL (0.5 mLs IntraMUSCular Given 8/22/22 1723)   lidocaine-EPINEPHrine 1 %-1:184657 injection 20 mL (20 mLs IntraDERmal Given by Other 8/22/22 1826)   iopamidol (ISOVUE-370) 76 % injection 75 mL (75 mLs IntraVENous Given 8/22/22 1759)   midazolam PF (VERSED) injection 0.5 mg (0.5 mg IntraVENous Given 8/22/22 1822)       New Prescriptions from this visit:    Discharge Medication List as of 8/22/2022  6:43 PM          Follow-up:  No follow-up provider specified.       Final Impression:   1. Subdural bleeding ([address]    2. Subarachnoid bleed ([address]                 (Please note that portions of this note were completed with a voice recognition program.  Efforts were made to edit the dictations but occasionally words are mis-transcribed.)    DOC Simmons PA-C  08/27/22 [id number]

## 2022-08-22 NOTE — ED NOTES
Writer notified Violet [place] of patient presentation and assessment.       Noni Gambino RN  08/22/22 [id number]

## 2022-08-22 NOTE — ED NOTES
No stepoffs or deformities to back; present midline lumbar scar, present midline surgical scar on back.       Pablo Diez RN  08/22/22 [id number]

## 2022-08-22 NOTE — ED PROVIDER NOTES
[facility]     Emergency Department     Faculty Attestation    I performed a history and physical examination of the patient and discussed management with the resident. I reviewed the residents note and agree with the documented findings and plan of care. Any areas of disagreement are noted on the chart. I was personally present for the key portions of any procedures. I have documented in the chart those procedures where I was not present during the key portions. I have reviewed the emergency nurses triage note. I agree with the chief complaint, past medical history, past surgical history, allergies, medications, social and family history as documented unless otherwise noted below. For Physician Assistant/ Nurse Practitioner cases/documentation I have personally evaluated this patient and have completed at least one if not all key elements of the E/M (history, physical exam, and MDM). Additional findings are as noted. I have personally seen and evaluated the patient. I find the patient's history and physical exam are consistent with the NP/PA documentation. I agree with the care provided, treatment rendered, disposition and follow-up plan. Transfer for evaluation of subdural hematoma patient is on Eliquis and did take a fall contusion noted to left frontal skull the patient's airway is currently intact      Critical Care     Justyna Locke M.D.   Attending Emergency  Physician            Italia Correa MD  08/22/22 0779

## 2022-08-22 NOTE — ED NOTES
Called [facility] for [facility].  Connected with Dr Rd Hernandez at [facility], [place]  08/22/22 3 [facility]

## 2022-08-22 NOTE — ED NOTES
Pt has tenderness on lower side of chest wall, has laceration with sutures over L eye, abdominal surgical scars and pain. kimberly erythema and open ulcers to bilateral lower extremities.       Annia Stoner RN  08/22/22 1914

## 2022-08-23 ENCOUNTER — ANESTHESIA (OUTPATIENT)
Dept: OPERATING ROOM | Age: 77
DRG: 025 | End: 2022-08-23
Payer: MEDICARE

## 2022-08-23 ENCOUNTER — APPOINTMENT (OUTPATIENT)
Dept: GENERAL RADIOLOGY | Age: 77
DRG: 025 | End: 2022-08-23
Payer: MEDICARE

## 2022-08-23 ENCOUNTER — ANESTHESIA EVENT (OUTPATIENT)
Dept: OPERATING ROOM | Age: 77
DRG: 025 | End: 2022-08-23
Payer: MEDICARE

## 2022-08-23 ENCOUNTER — APPOINTMENT (OUTPATIENT)
Dept: CT IMAGING | Age: 77
DRG: 025 | End: 2022-08-23
Payer: MEDICARE

## 2022-08-23 PROBLEM — S06.A0XA MIDLINE SHIFT OF BRAIN DUE TO HEMATOMA: Status: ACTIVE | Noted: 2022-08-23

## 2022-08-23 PROBLEM — S06.2XAA MIDLINE SHIFT OF BRAIN DUE TO HEMATOMA: Status: ACTIVE | Noted: 2022-08-23

## 2022-08-23 PROBLEM — S06.2X0S MIDLINE SHIFT OF BRAIN DUE TO HEMATOMA (HCC): Status: ACTIVE | Noted: 2022-08-23

## 2022-08-23 PROBLEM — G93.89 MIDLINE SHIFT OF BRAIN DUE TO HEMATOMA (HCC): Status: ACTIVE | Noted: 2022-08-23

## 2022-08-23 LAB
ABSOLUTE EOS #: 0 K/UL (ref 0–0.4)
ABSOLUTE EOS #: 0.04 K/UL (ref 0–0.44)
ABSOLUTE IMMATURE GRANULOCYTE: 0 K/UL (ref 0–0.3)
ABSOLUTE IMMATURE GRANULOCYTE: <0.03 K/UL (ref 0–0.3)
ABSOLUTE LYMPH #: 0.8 K/UL (ref 1–4.8)
ABSOLUTE LYMPH #: 0.91 K/UL (ref 1.1–3.7)
ABSOLUTE MONO #: 0.47 K/UL (ref 0.1–0.8)
ABSOLUTE MONO #: 0.49 K/UL (ref 0.1–1.2)
AMPHETAMINE SCREEN URINE: NEGATIVE
ANION GAP SERPL CALCULATED.3IONS-SCNC: 10 MMOL/L (ref 9–17)
ANION GAP SERPL CALCULATED.3IONS-SCNC: 11 MMOL/L (ref 9–17)
BARBITURATE SCREEN URINE: NEGATIVE
BASOPHILS # BLD: 0 % (ref 0–2)
BASOPHILS # BLD: 1 % (ref 0–2)
BASOPHILS ABSOLUTE: 0 K/UL (ref 0–0.2)
BASOPHILS ABSOLUTE: 0.05 K/UL (ref 0–0.2)
BENZODIAZEPINE SCREEN, URINE: POSITIVE
BILIRUBIN URINE: NEGATIVE
BUN BLDV-MCNC: 10 MG/DL (ref 8–23)
BUN BLDV-MCNC: 9 MG/DL (ref 8–23)
CALCIUM IONIZED: 1.05 MMOL/L (ref 1.13–1.33)
CALCIUM IONIZED: 1.16 MMOL/L (ref 1.13–1.33)
CALCIUM IONIZED: 1.19 MMOL/L (ref 1.13–1.33)
CALCIUM SERPL-MCNC: 8.7 MG/DL (ref 8.6–10.4)
CALCIUM SERPL-MCNC: 8.8 MG/DL (ref 8.6–10.4)
CANNABINOID SCREEN URINE: NEGATIVE
CHLORIDE BLD-SCNC: 100 MMOL/L (ref 98–107)
CHLORIDE BLD-SCNC: 99 MMOL/L (ref 98–107)
CO2: 24 MMOL/L (ref 20–31)
CO2: 25 MMOL/L (ref 20–31)
COCAINE METABOLITE, URINE: NEGATIVE
COLOR: YELLOW
COMMENT UA: ABNORMAL
CREAT SERPL-MCNC: 0.31 MG/DL (ref 0.5–0.9)
CREAT SERPL-MCNC: 0.41 MG/DL (ref 0.5–0.9)
EKG ATRIAL RATE: 65 BPM
EKG P AXIS: 59 DEGREES
EKG P-R INTERVAL: 196 MS
EKG Q-T INTERVAL: 438 MS
EKG QRS DURATION: 92 MS
EKG QTC CALCULATION (BAZETT): 455 MS
EKG R AXIS: -13 DEGREES
EKG T AXIS: 47 DEGREES
EKG VENTRICULAR RATE: 65 BPM
EOSINOPHILS RELATIVE PERCENT: 0 % (ref 1–4)
EOSINOPHILS RELATIVE PERCENT: 1 % (ref 1–4)
FENTANYL URINE: NEGATIVE
FIBRINOGEN, FUNCTIONAL TEG: 15.7 MM (ref 15–32)
FIO2: 100
GFR AFRICAN AMERICAN: >60 ML/MIN
GFR AFRICAN AMERICAN: >60 ML/MIN
GFR NON-AFRICAN AMERICAN: >60 ML/MIN
GFR NON-AFRICAN AMERICAN: >60 ML/MIN
GFR SERPL CREATININE-BSD FRML MDRD: ABNORMAL ML/MIN/{1.73_M2}
GFR SERPL CREATININE-BSD FRML MDRD: ABNORMAL ML/MIN/{1.73_M2}
GLUCOSE BLD-MCNC: 100 MG/DL (ref 65–105)
GLUCOSE BLD-MCNC: 107 MG/DL (ref 65–105)
GLUCOSE BLD-MCNC: 112 MG/DL (ref 74–100)
GLUCOSE BLD-MCNC: 122 MG/DL (ref 70–99)
GLUCOSE BLD-MCNC: 99 MG/DL (ref 70–99)
GLUCOSE URINE: NEGATIVE
HCT VFR BLD CALC: 37 % (ref 36.3–47.1)
HCT VFR BLD CALC: 40.2 % (ref 36.3–47.1)
HEMOGLOBIN: 11.9 G/DL (ref 11.9–15.1)
HEMOGLOBIN: 13.1 G/DL (ref 11.9–15.1)
IMMATURE GRANULOCYTES: 0 %
IMMATURE GRANULOCYTES: 0 %
INR BLD: 0.9
KETONES, URINE: NEGATIVE
LEUKOCYTE ESTERASE, URINE: NEGATIVE
LY30 (LYSIS) TEG: 0 % (ref 0–2.6)
LYMPHOCYTES # BLD: 12 % (ref 24–44)
LYMPHOCYTES # BLD: 17 % (ref 24–43)
MA(MAX CLOT) RAPID TEG: 49.2 MM (ref 52–70)
MAGNESIUM: 1.6 MG/DL (ref 1.6–2.6)
MAGNESIUM: 1.8 MG/DL (ref 1.6–2.6)
MCH RBC QN AUTO: 31.2 PG (ref 25.2–33.5)
MCH RBC QN AUTO: 31.5 PG (ref 25.2–33.5)
MCHC RBC AUTO-ENTMCNC: 32.2 G/DL (ref 28.4–34.8)
MCHC RBC AUTO-ENTMCNC: 32.6 G/DL (ref 28.4–34.8)
MCV RBC AUTO: 96.6 FL (ref 82.6–102.9)
MCV RBC AUTO: 97.1 FL (ref 82.6–102.9)
METHADONE SCREEN, URINE: NEGATIVE
MODE: ABNORMAL
MONOCYTES # BLD: 7 % (ref 1–7)
MONOCYTES # BLD: 9 % (ref 3–12)
MORPHOLOGY: NORMAL
MRSA, DNA, NASAL: NEGATIVE
NITRITE, URINE: NEGATIVE
NRBC AUTOMATED: 0 PER 100 WBC
NRBC AUTOMATED: 0 PER 100 WBC
O2 DEVICE/FLOW/%: ABNORMAL
OPIATES, URINE: POSITIVE
OXYCODONE SCREEN URINE: POSITIVE
PARTIAL THROMBOPLASTIN TIME: 19.5 SEC (ref 20.5–30.5)
PDW BLD-RTO: 13.7 % (ref 11.8–14.4)
PDW BLD-RTO: 13.9 % (ref 11.8–14.4)
PH UA: 6.5 (ref 5–8)
PHENCYCLIDINE, URINE: NEGATIVE
PHOSPHORUS: 2.5 MG/DL (ref 2.6–4.5)
PHOSPHORUS: 2.8 MG/DL (ref 2.6–4.5)
PLATELET # BLD: 152 K/UL (ref 138–453)
PLATELET # BLD: 231 K/UL (ref 138–453)
PMV BLD AUTO: 10.5 FL (ref 8.1–13.5)
PMV BLD AUTO: 9.6 FL (ref 8.1–13.5)
POC HCO3: 25.7 MMOL/L (ref 21–28)
POC LACTIC ACID: 1.17 MMOL/L (ref 0.56–1.39)
POC O2 SATURATION: 100 % (ref 94–98)
POC PCO2: 44.5 MM HG (ref 35–48)
POC PH: 7.37 (ref 7.35–7.45)
POC PO2: 599.4 MM HG (ref 83–108)
POSITIVE BASE EXCESS, ART: 0 (ref 0–3)
POTASSIUM SERPL-SCNC: 3.4 MMOL/L (ref 3.7–5.3)
POTASSIUM SERPL-SCNC: 3.5 MMOL/L (ref 3.7–5.3)
PROTEIN UA: NEGATIVE
PROTHROMBIN TIME: 10.1 SEC (ref 9.1–12.3)
RBC # BLD: 3.81 M/UL (ref 3.95–5.11)
RBC # BLD: 4.16 M/UL (ref 3.95–5.11)
REACTION TIME TEG: 2 MIN (ref 4.6–9.1)
SAMPLE SITE: ABNORMAL
SEG NEUTROPHILS: 72 % (ref 36–65)
SEG NEUTROPHILS: 81 % (ref 36–66)
SEGMENTED NEUTROPHILS ABSOLUTE COUNT: 3.85 K/UL (ref 1.5–8.1)
SEGMENTED NEUTROPHILS ABSOLUTE COUNT: 5.43 K/UL (ref 1.8–7.7)
SODIUM BLD-SCNC: 134 MMOL/L (ref 135–144)
SODIUM BLD-SCNC: 135 MMOL/L (ref 135–144)
SPECIFIC GRAVITY UA: 1.05 (ref 1–1.03)
SPECIMEN DESCRIPTION: NORMAL
TEST INFORMATION: ABNORMAL
TURBIDITY: CLEAR
URINE HGB: NEGATIVE
UROBILINOGEN, URINE: NORMAL
WBC # BLD: 5.4 K/UL (ref 3.5–11.3)
WBC # BLD: 6.7 K/UL (ref 3.5–11.3)

## 2022-08-23 PROCEDURE — 3600000004 HC SURGERY LEVEL 4 BASE: Performed by: NEUROLOGICAL SURGERY

## 2022-08-23 PROCEDURE — 85390 FIBRINOLYSINS SCREEN I&R: CPT

## 2022-08-23 PROCEDURE — 82947 ASSAY GLUCOSE BLOOD QUANT: CPT

## 2022-08-23 PROCEDURE — 3600000014 HC SURGERY LEVEL 4 ADDTL 15MIN: Performed by: NEUROLOGICAL SURGERY

## 2022-08-23 PROCEDURE — 84100 ASSAY OF PHOSPHORUS: CPT

## 2022-08-23 PROCEDURE — 82803 BLOOD GASES ANY COMBINATION: CPT

## 2022-08-23 PROCEDURE — 6360000002 HC RX W HCPCS

## 2022-08-23 PROCEDURE — 6360000002 HC RX W HCPCS: Performed by: NEUROLOGICAL SURGERY

## 2022-08-23 PROCEDURE — 2500000003 HC RX 250 WO HCPCS: Performed by: NEUROLOGICAL SURGERY

## 2022-08-23 PROCEDURE — 81003 URINALYSIS AUTO W/O SCOPE: CPT

## 2022-08-23 PROCEDURE — 82330 ASSAY OF CALCIUM: CPT

## 2022-08-23 PROCEDURE — 85347 COAGULATION TIME ACTIVATED: CPT

## 2022-08-23 PROCEDURE — 71045 X-RAY EXAM CHEST 1 VIEW: CPT

## 2022-08-23 PROCEDURE — 6360000002 HC RX W HCPCS: Performed by: PHYSICIAN ASSISTANT

## 2022-08-23 PROCEDURE — 83605 ASSAY OF LACTIC ACID: CPT

## 2022-08-23 PROCEDURE — 74018 RADEX ABDOMEN 1 VIEW: CPT

## 2022-08-23 PROCEDURE — 85384 FIBRINOGEN ACTIVITY: CPT

## 2022-08-23 PROCEDURE — 80307 DRUG TEST PRSMV CHEM ANLYZR: CPT

## 2022-08-23 PROCEDURE — 70450 CT HEAD/BRAIN W/O DYE: CPT

## 2022-08-23 PROCEDURE — 2500000003 HC RX 250 WO HCPCS: Performed by: STUDENT IN AN ORGANIZED HEALTH CARE EDUCATION/TRAINING PROGRAM

## 2022-08-23 PROCEDURE — 94761 N-INVAS EAR/PLS OXIMETRY MLT: CPT

## 2022-08-23 PROCEDURE — 2580000003 HC RX 258: Performed by: NURSE ANESTHETIST, CERTIFIED REGISTERED

## 2022-08-23 PROCEDURE — 85610 PROTHROMBIN TIME: CPT

## 2022-08-23 PROCEDURE — 95700 EEG CONT REC W/VID EEG TECH: CPT

## 2022-08-23 PROCEDURE — 3700000001 HC ADD 15 MINUTES (ANESTHESIA): Performed by: NEUROLOGICAL SURGERY

## 2022-08-23 PROCEDURE — 2580000003 HC RX 258: Performed by: PHYSICIAN ASSISTANT

## 2022-08-23 PROCEDURE — C1713 ANCHOR/SCREW BN/BN,TIS/BN: HCPCS | Performed by: NEUROLOGICAL SURGERY

## 2022-08-23 PROCEDURE — 2709999900 HC NON-CHARGEABLE SUPPLY: Performed by: NEUROLOGICAL SURGERY

## 2022-08-23 PROCEDURE — 85025 COMPLETE CBC W/AUTO DIFF WBC: CPT

## 2022-08-23 PROCEDURE — 6370000000 HC RX 637 (ALT 250 FOR IP): Performed by: NURSE PRACTITIONER

## 2022-08-23 PROCEDURE — 36415 COLL VENOUS BLD VENIPUNCTURE: CPT

## 2022-08-23 PROCEDURE — 83735 ASSAY OF MAGNESIUM: CPT

## 2022-08-23 PROCEDURE — 6370000000 HC RX 637 (ALT 250 FOR IP)

## 2022-08-23 PROCEDURE — 6370000000 HC RX 637 (ALT 250 FOR IP): Performed by: NEUROLOGICAL SURGERY

## 2022-08-23 PROCEDURE — 2580000003 HC RX 258: Performed by: NEUROLOGICAL SURGERY

## 2022-08-23 PROCEDURE — 2500000003 HC RX 250 WO HCPCS: Performed by: HEALTH CARE PROVIDER

## 2022-08-23 PROCEDURE — 87086 URINE CULTURE/COLONY COUNT: CPT

## 2022-08-23 PROCEDURE — 2720000010 HC SURG SUPPLY STERILE: Performed by: NEUROLOGICAL SURGERY

## 2022-08-23 PROCEDURE — 3700000000 HC ANESTHESIA ATTENDED CARE: Performed by: NEUROLOGICAL SURGERY

## 2022-08-23 PROCEDURE — 80048 BASIC METABOLIC PNL TOTAL CA: CPT

## 2022-08-23 PROCEDURE — 2500000003 HC RX 250 WO HCPCS: Performed by: NURSE PRACTITIONER

## 2022-08-23 PROCEDURE — 61312 CRNEC/CRNOT STTL XDRL/SDRL: CPT | Performed by: NEUROLOGICAL SURGERY

## 2022-08-23 PROCEDURE — 6360000002 HC RX W HCPCS: Performed by: HEALTH CARE PROVIDER

## 2022-08-23 PROCEDURE — 00C40ZZ EXTIRPATION OF MATTER FROM INTRACRANIAL SUBDURAL SPACE, OPEN APPROACH: ICD-10-PCS | Performed by: NEUROLOGICAL SURGERY

## 2022-08-23 PROCEDURE — 99223 1ST HOSP IP/OBS HIGH 75: CPT | Performed by: NEUROLOGICAL SURGERY

## 2022-08-23 PROCEDURE — 93010 ELECTROCARDIOGRAM REPORT: CPT | Performed by: INTERNAL MEDICINE

## 2022-08-23 PROCEDURE — 6360000002 HC RX W HCPCS: Performed by: STUDENT IN AN ORGANIZED HEALTH CARE EDUCATION/TRAINING PROGRAM

## 2022-08-23 PROCEDURE — 2580000003 HC RX 258

## 2022-08-23 PROCEDURE — 2500000003 HC RX 250 WO HCPCS: Performed by: PHYSICIAN ASSISTANT

## 2022-08-23 PROCEDURE — 94002 VENT MGMT INPAT INIT DAY: CPT

## 2022-08-23 PROCEDURE — 6360000002 HC RX W HCPCS: Performed by: NURSE ANESTHETIST, CERTIFIED REGISTERED

## 2022-08-23 PROCEDURE — 85576 BLOOD PLATELET AGGREGATION: CPT

## 2022-08-23 PROCEDURE — 2000000000 HC ICU R&B

## 2022-08-23 PROCEDURE — 2580000003 HC RX 258: Performed by: HEALTH CARE PROVIDER

## 2022-08-23 PROCEDURE — 85730 THROMBOPLASTIN TIME PARTIAL: CPT

## 2022-08-23 PROCEDURE — 2700000000 HC OXYGEN THERAPY PER DAY

## 2022-08-23 PROCEDURE — 95711 VEEG 2-12 HR UNMONITORED: CPT

## 2022-08-23 PROCEDURE — 2500000003 HC RX 250 WO HCPCS: Performed by: NURSE ANESTHETIST, CERTIFIED REGISTERED

## 2022-08-23 PROCEDURE — 37799 UNLISTED PX VASCULAR SURGERY: CPT

## 2022-08-23 DEVICE — CRANIOFIX 2 TITANIUM CLAMP 16MM
Type: IMPLANTABLE DEVICE | Site: CRANIAL | Status: FUNCTIONAL
Brand: CRANIOFIX2

## 2022-08-23 RX ORDER — SODIUM CHLORIDE 9 MG/ML
INJECTION, SOLUTION INTRAVENOUS PRN
Status: DISCONTINUED | OUTPATIENT
Start: 2022-08-23 | End: 2022-08-25

## 2022-08-23 RX ORDER — PROPOFOL 10 MG/ML
5-50 INJECTION, EMULSION INTRAVENOUS CONTINUOUS
Status: DISCONTINUED | OUTPATIENT
Start: 2022-08-23 | End: 2022-08-25

## 2022-08-23 RX ORDER — SODIUM CHLORIDE 0.9 % (FLUSH) 0.9 %
5-40 SYRINGE (ML) INJECTION EVERY 12 HOURS SCHEDULED
Status: DISCONTINUED | OUTPATIENT
Start: 2022-08-23 | End: 2022-08-29

## 2022-08-23 RX ORDER — ONDANSETRON 4 MG/1
4 TABLET, ORALLY DISINTEGRATING ORAL EVERY 8 HOURS PRN
Status: DISCONTINUED | OUTPATIENT
Start: 2022-08-23 | End: 2022-08-23

## 2022-08-23 RX ORDER — FENTANYL CITRATE 50 UG/ML
INJECTION, SOLUTION INTRAMUSCULAR; INTRAVENOUS PRN
Status: DISCONTINUED | OUTPATIENT
Start: 2022-08-23 | End: 2022-08-23 | Stop reason: SDUPTHER

## 2022-08-23 RX ORDER — GINSENG 100 MG
CAPSULE ORAL PRN
Status: DISCONTINUED | OUTPATIENT
Start: 2022-08-23 | End: 2022-08-23 | Stop reason: ALTCHOICE

## 2022-08-23 RX ORDER — SCOLOPAMINE TRANSDERMAL SYSTEM 1 MG/1
1 PATCH, EXTENDED RELEASE TRANSDERMAL
Status: DISCONTINUED | OUTPATIENT
Start: 2022-08-23 | End: 2022-08-29

## 2022-08-23 RX ORDER — MORPHINE SULFATE 2 MG/ML
2 INJECTION, SOLUTION INTRAMUSCULAR; INTRAVENOUS
Status: DISCONTINUED | OUTPATIENT
Start: 2022-08-23 | End: 2022-08-23

## 2022-08-23 RX ORDER — OXYCODONE HYDROCHLORIDE 5 MG/1
10 TABLET ORAL EVERY 4 HOURS PRN
Status: DISCONTINUED | OUTPATIENT
Start: 2022-08-23 | End: 2022-08-23

## 2022-08-23 RX ORDER — PANTOPRAZOLE SODIUM 40 MG/1
40 TABLET, DELAYED RELEASE ORAL
Refills: 3 | Status: DISCONTINUED | OUTPATIENT
Start: 2022-08-24 | End: 2022-08-24

## 2022-08-23 RX ORDER — SODIUM CHLORIDE 9 MG/ML
INJECTION, SOLUTION INTRAVENOUS CONTINUOUS PRN
Status: DISCONTINUED | OUTPATIENT
Start: 2022-08-23 | End: 2022-08-23 | Stop reason: SDUPTHER

## 2022-08-23 RX ORDER — PREGABALIN 100 MG/1
300 CAPSULE ORAL 2 TIMES DAILY
Status: DISCONTINUED | OUTPATIENT
Start: 2022-08-23 | End: 2022-08-30 | Stop reason: HOSPADM

## 2022-08-23 RX ORDER — ONDANSETRON 2 MG/ML
4 INJECTION INTRAMUSCULAR; INTRAVENOUS EVERY 6 HOURS PRN
Status: DISCONTINUED | OUTPATIENT
Start: 2022-08-23 | End: 2022-08-23

## 2022-08-23 RX ORDER — LANOLIN ALCOHOL/MO/W.PET/CERES
400 CREAM (GRAM) TOPICAL
Status: COMPLETED | OUTPATIENT
Start: 2022-08-23 | End: 2022-08-23

## 2022-08-23 RX ORDER — ROCURONIUM BROMIDE 10 MG/ML
INJECTION, SOLUTION INTRAVENOUS PRN
Status: DISCONTINUED | OUTPATIENT
Start: 2022-08-23 | End: 2022-08-23 | Stop reason: SDUPTHER

## 2022-08-23 RX ORDER — MORPHINE SULFATE 4 MG/ML
4 INJECTION, SOLUTION INTRAMUSCULAR; INTRAVENOUS
Status: DISCONTINUED | OUTPATIENT
Start: 2022-08-23 | End: 2022-08-23

## 2022-08-23 RX ORDER — POTASSIUM CHLORIDE 20 MEQ/1
40 TABLET, EXTENDED RELEASE ORAL ONCE
Status: DISCONTINUED | OUTPATIENT
Start: 2022-08-23 | End: 2022-08-23

## 2022-08-23 RX ORDER — PROPOFOL 10 MG/ML
INJECTION, EMULSION INTRAVENOUS PRN
Status: DISCONTINUED | OUTPATIENT
Start: 2022-08-23 | End: 2022-08-23 | Stop reason: SDUPTHER

## 2022-08-23 RX ORDER — HYDRALAZINE HYDROCHLORIDE 20 MG/ML
INJECTION INTRAMUSCULAR; INTRAVENOUS
Status: DISPENSED
Start: 2022-08-23 | End: 2022-08-24

## 2022-08-23 RX ORDER — SODIUM CHLORIDE 0.9 % (FLUSH) 0.9 %
5-40 SYRINGE (ML) INJECTION PRN
Status: DISCONTINUED | OUTPATIENT
Start: 2022-08-23 | End: 2022-08-30 | Stop reason: HOSPADM

## 2022-08-23 RX ORDER — HYDRALAZINE HYDROCHLORIDE 20 MG/ML
10 INJECTION INTRAMUSCULAR; INTRAVENOUS ONCE
Status: COMPLETED | OUTPATIENT
Start: 2022-08-23 | End: 2022-08-23

## 2022-08-23 RX ORDER — LEVOTHYROXINE SODIUM 0.1 MG/1
200 TABLET ORAL DAILY
Status: DISCONTINUED | OUTPATIENT
Start: 2022-08-23 | End: 2022-08-30 | Stop reason: HOSPADM

## 2022-08-23 RX ORDER — OXYCODONE HYDROCHLORIDE 5 MG/1
5 TABLET ORAL EVERY 6 HOURS PRN
Status: DISCONTINUED | OUTPATIENT
Start: 2022-08-23 | End: 2022-08-24

## 2022-08-23 RX ORDER — SODIUM CHLORIDE, SODIUM LACTATE, POTASSIUM CHLORIDE, CALCIUM CHLORIDE 600; 310; 30; 20 MG/100ML; MG/100ML; MG/100ML; MG/100ML
INJECTION, SOLUTION INTRAVENOUS CONTINUOUS PRN
Status: DISCONTINUED | OUTPATIENT
Start: 2022-08-23 | End: 2022-08-23 | Stop reason: SDUPTHER

## 2022-08-23 RX ORDER — FENTANYL CITRATE 50 UG/ML
25 INJECTION, SOLUTION INTRAMUSCULAR; INTRAVENOUS
Status: DISCONTINUED | OUTPATIENT
Start: 2022-08-23 | End: 2022-08-25

## 2022-08-23 RX ORDER — HYDRALAZINE HYDROCHLORIDE 20 MG/ML
5 INJECTION INTRAMUSCULAR; INTRAVENOUS EVERY 4 HOURS PRN
Status: DISCONTINUED | OUTPATIENT
Start: 2022-08-23 | End: 2022-08-23

## 2022-08-23 RX ORDER — LIDOCAINE HYDROCHLORIDE AND EPINEPHRINE 10; 10 MG/ML; UG/ML
INJECTION, SOLUTION INFILTRATION; PERINEURAL PRN
Status: DISCONTINUED | OUTPATIENT
Start: 2022-08-23 | End: 2022-08-23 | Stop reason: ALTCHOICE

## 2022-08-23 RX ORDER — ALBUTEROL SULFATE 90 UG/1
2 AEROSOL, METERED RESPIRATORY (INHALATION) 4 TIMES DAILY
Status: DISCONTINUED | OUTPATIENT
Start: 2022-08-23 | End: 2022-08-24

## 2022-08-23 RX ORDER — PROCHLORPERAZINE EDISYLATE 5 MG/ML
5 INJECTION INTRAMUSCULAR; INTRAVENOUS ONCE
Status: COMPLETED | OUTPATIENT
Start: 2022-08-23 | End: 2022-08-23

## 2022-08-23 RX ORDER — NICARDIPINE HYDROCHLORIDE 0.1 MG/ML
INJECTION INTRAVENOUS
Status: DISPENSED
Start: 2022-08-23 | End: 2022-08-24

## 2022-08-23 RX ORDER — HYDRALAZINE HYDROCHLORIDE 20 MG/ML
10 INJECTION INTRAMUSCULAR; INTRAVENOUS EVERY 6 HOURS PRN
Status: DISCONTINUED | OUTPATIENT
Start: 2022-08-23 | End: 2022-08-24

## 2022-08-23 RX ORDER — LABETALOL HYDROCHLORIDE 5 MG/ML
10 INJECTION, SOLUTION INTRAVENOUS EVERY 6 HOURS PRN
Status: DISCONTINUED | OUTPATIENT
Start: 2022-08-23 | End: 2022-08-29

## 2022-08-23 RX ORDER — MAGNESIUM HYDROXIDE 1200 MG/15ML
LIQUID ORAL CONTINUOUS PRN
Status: COMPLETED | OUTPATIENT
Start: 2022-08-23 | End: 2022-08-23

## 2022-08-23 RX ORDER — VANCOMYCIN HYDROCHLORIDE 1 G/20ML
INJECTION, POWDER, LYOPHILIZED, FOR SOLUTION INTRAVENOUS PRN
Status: DISCONTINUED | OUTPATIENT
Start: 2022-08-23 | End: 2022-08-23 | Stop reason: ALTCHOICE

## 2022-08-23 RX ORDER — POTASSIUM CHLORIDE 20 MEQ/1
20 TABLET, EXTENDED RELEASE ORAL ONCE
Status: DISCONTINUED | OUTPATIENT
Start: 2022-08-24 | End: 2022-08-23

## 2022-08-23 RX ORDER — LIDOCAINE HYDROCHLORIDE 10 MG/ML
INJECTION, SOLUTION EPIDURAL; INFILTRATION; INTRACAUDAL; PERINEURAL PRN
Status: DISCONTINUED | OUTPATIENT
Start: 2022-08-23 | End: 2022-08-23 | Stop reason: SDUPTHER

## 2022-08-23 RX ORDER — OXYCODONE HYDROCHLORIDE 5 MG/1
5 TABLET ORAL EVERY 4 HOURS PRN
Status: DISCONTINUED | OUTPATIENT
Start: 2022-08-23 | End: 2022-08-23

## 2022-08-23 RX ORDER — EPHEDRINE SULFATE/0.9% NACL/PF 50 MG/5 ML
SYRINGE (ML) INTRAVENOUS PRN
Status: DISCONTINUED | OUTPATIENT
Start: 2022-08-23 | End: 2022-08-23 | Stop reason: SDUPTHER

## 2022-08-23 RX ORDER — PROPOFOL 10 MG/ML
10 INJECTION, EMULSION INTRAVENOUS ONCE
Status: DISCONTINUED | OUTPATIENT
Start: 2022-08-23 | End: 2022-08-24

## 2022-08-23 RX ORDER — ACETAMINOPHEN 325 MG/1
650 TABLET ORAL EVERY 6 HOURS
Status: DISCONTINUED | OUTPATIENT
Start: 2022-08-23 | End: 2022-08-29

## 2022-08-23 RX ORDER — MAGNESIUM SULFATE IN WATER 40 MG/ML
2000 INJECTION, SOLUTION INTRAVENOUS ONCE
Status: COMPLETED | OUTPATIENT
Start: 2022-08-23 | End: 2022-08-24

## 2022-08-23 RX ORDER — BISACODYL 10 MG
10 SUPPOSITORY, RECTAL RECTAL DAILY PRN
Status: DISCONTINUED | OUTPATIENT
Start: 2022-08-23 | End: 2022-08-29

## 2022-08-23 RX ORDER — CALCIUM GLUCONATE 20 MG/ML
1000 INJECTION, SOLUTION INTRAVENOUS ONCE
Status: COMPLETED | OUTPATIENT
Start: 2022-08-23 | End: 2022-08-23

## 2022-08-23 RX ORDER — SENNA AND DOCUSATE SODIUM 50; 8.6 MG/1; MG/1
1 TABLET, FILM COATED ORAL 2 TIMES DAILY
Status: DISCONTINUED | OUTPATIENT
Start: 2022-08-23 | End: 2022-08-30 | Stop reason: HOSPADM

## 2022-08-23 RX ORDER — SODIUM CHLORIDE 9 MG/ML
INJECTION, SOLUTION INTRAVENOUS CONTINUOUS
Status: DISCONTINUED | OUTPATIENT
Start: 2022-08-23 | End: 2022-08-23

## 2022-08-23 RX ADMIN — Medication 10 MG: at 16:24

## 2022-08-23 RX ADMIN — CALCIUM GLUCONATE 1000 MG: 20 INJECTION, SOLUTION INTRAVENOUS at 08:53

## 2022-08-23 RX ADMIN — SODIUM CHLORIDE: 9 INJECTION, SOLUTION INTRAVENOUS at 06:41

## 2022-08-23 RX ADMIN — SODIUM CHLORIDE, PRESERVATIVE FREE 10 ML: 5 INJECTION INTRAVENOUS at 08:30

## 2022-08-23 RX ADMIN — ONDANSETRON 4 MG: 2 INJECTION INTRAMUSCULAR; INTRAVENOUS at 03:38

## 2022-08-23 RX ADMIN — ROCURONIUM BROMIDE 50 MG: 10 INJECTION, SOLUTION INTRAVENOUS at 13:53

## 2022-08-23 RX ADMIN — PROPOFOL 50 MCG/KG/MIN: 10 INJECTION, EMULSION INTRAVENOUS at 18:50

## 2022-08-23 RX ADMIN — Medication 10 MG: at 16:02

## 2022-08-23 RX ADMIN — PROPOFOL 120 MG: 10 INJECTION, EMULSION INTRAVENOUS at 13:46

## 2022-08-23 RX ADMIN — HYDRALAZINE HYDROCHLORIDE 10 MG: 20 INJECTION, SOLUTION INTRAMUSCULAR; INTRAVENOUS at 22:50

## 2022-08-23 RX ADMIN — MAGNESIUM SULFATE HEPTAHYDRATE 2000 MG: 40 INJECTION, SOLUTION INTRAVENOUS at 23:06

## 2022-08-23 RX ADMIN — PROPOFOL 10 MCG/KG/MIN: 10 INJECTION, EMULSION INTRAVENOUS at 20:14

## 2022-08-23 RX ADMIN — SODIUM CHLORIDE, POTASSIUM CHLORIDE, SODIUM LACTATE AND CALCIUM CHLORIDE: 600; 310; 30; 20 INJECTION, SOLUTION INTRAVENOUS at 13:38

## 2022-08-23 RX ADMIN — POTASSIUM BICARBONATE 40 MEQ: 782 TABLET, EFFERVESCENT ORAL at 10:08

## 2022-08-23 RX ADMIN — FENTANYL CITRATE 100 MCG: 50 INJECTION, SOLUTION INTRAMUSCULAR; INTRAVENOUS at 13:46

## 2022-08-23 RX ADMIN — LEVETIRACETAM 500 MG: 5 INJECTION INTRAVENOUS at 08:36

## 2022-08-23 RX ADMIN — Medication 2000 MG: at 21:57

## 2022-08-23 RX ADMIN — PROPOFOL 50 MCG/KG/MIN: 10 INJECTION, EMULSION INTRAVENOUS at 16:08

## 2022-08-23 RX ADMIN — LEVETIRACETAM 500 MG: 5 INJECTION INTRAVENOUS at 21:57

## 2022-08-23 RX ADMIN — HYDRALAZINE HYDROCHLORIDE 10 MG: 20 INJECTION, SOLUTION INTRAMUSCULAR; INTRAVENOUS at 21:25

## 2022-08-23 RX ADMIN — LIDOCAINE HYDROCHLORIDE 50 MG: 10 INJECTION, SOLUTION EPIDURAL; INFILTRATION; INTRACAUDAL; PERINEURAL at 13:46

## 2022-08-23 RX ADMIN — SODIUM CHLORIDE 5 MG/HR: 9 INJECTION, SOLUTION INTRAVENOUS at 11:36

## 2022-08-23 RX ADMIN — SODIUM CHLORIDE: 9 INJECTION, SOLUTION INTRAVENOUS at 13:29

## 2022-08-23 RX ADMIN — HYDRALAZINE HYDROCHLORIDE 5 MG: 20 INJECTION, SOLUTION INTRAMUSCULAR; INTRAVENOUS at 09:09

## 2022-08-23 RX ADMIN — SODIUM CHLORIDE 2.5 MG/HR: 9 INJECTION, SOLUTION INTRAVENOUS at 23:24

## 2022-08-23 RX ADMIN — ROCURONIUM BROMIDE 30 MG: 10 INJECTION, SOLUTION INTRAVENOUS at 14:50

## 2022-08-23 RX ADMIN — Medication 50 MCG/HR: at 19:36

## 2022-08-23 RX ADMIN — Medication 10 MG: at 10:08

## 2022-08-23 RX ADMIN — MAGNESIUM GLUCONATE 500 MG ORAL TABLET 400 MG: 500 TABLET ORAL at 10:00

## 2022-08-23 RX ADMIN — FAMOTIDINE 20 MG: 20 TABLET, FILM COATED ORAL at 08:31

## 2022-08-23 RX ADMIN — SODIUM CHLORIDE: 9 INJECTION, SOLUTION INTRAVENOUS at 18:47

## 2022-08-23 RX ADMIN — PROCHLORPERAZINE EDISYLATE 5 MG: 5 INJECTION INTRAMUSCULAR; INTRAVENOUS at 08:37

## 2022-08-23 RX ADMIN — MAGNESIUM GLUCONATE 500 MG ORAL TABLET 400 MG: 500 TABLET ORAL at 11:38

## 2022-08-23 RX ADMIN — OXYCODONE 2.5 MG: 5 TABLET ORAL at 02:29

## 2022-08-23 ASSESSMENT — PULMONARY FUNCTION TESTS
PIF_VALUE: 16
PIF_VALUE: 16
PIF_VALUE: 14
PIF_VALUE: 15
PIF_VALUE: 15

## 2022-08-23 ASSESSMENT — PAIN SCALES - GENERAL
PAINLEVEL_OUTOF10: 0
PAINLEVEL_OUTOF10: 4
PAINLEVEL_OUTOF10: 7
PAINLEVEL_OUTOF10: 0
PAINLEVEL_OUTOF10: 3

## 2022-08-23 ASSESSMENT — PAIN - FUNCTIONAL ASSESSMENT: PAIN_FUNCTIONAL_ASSESSMENT: 0-10

## 2022-08-23 ASSESSMENT — COPD QUESTIONNAIRES: CAT_SEVERITY: NO INTERVAL CHANGE

## 2022-08-23 ASSESSMENT — PAIN DESCRIPTION - PAIN TYPE: TYPE: ACUTE PAIN

## 2022-08-23 NOTE — ED NOTES
Report given to [address] 1 floor RN; all questions addressed     Imtiaz Ocampo RN  08/22/22 4769

## 2022-08-23 NOTE — DISCHARGE INSTR - COC
Continuity of Care Form    Patient Name: Ana Lilia Vivar   :  1945  MRN:  [id number]    Admit date:  2022  Discharge date:  22      Code Status Order: Full Code   Advance Directives:     Admitting Physician:  Elsie Null MD  PCP: Herbert Macias MD    Discharging Nurse: [address] Unit/Room#: 1001/1001-01  Discharging Unit Phone Number: [phone]    Emergency Contact:   Extended Emergency Contact Information  Primary Emergency Contact: Jerrell South  Address: [facility] [address] [address] Phone: [phone]  Mobile Phone: [phone]  Relation: Spouse  Hearing or visual needs: None  Other needs: None  Preferred language: English   needed? No  Secondary Emergency Contact: [address] Phone: [phone]  Relation: Child  Preferred language: English   needed?  No    Past Surgical History:  Past Surgical History:   Procedure Laterality Date    BACK SURGERY  1998    spinal cord stimulator    BACK SURGERY  1999    infusion pump insertion    BACK SURGERY  10/23/2003    spinal cord stimulator removed    BACK SURGERY  10/23/2003    new infusion pump placed    BACK SURGERY  2017    replaced infusion pump    CARPAL TUNNEL RELEASE Right 1999    CARPAL TUNNEL RELEASE Left 1999    CARPAL TUNNEL RELEASE Right 2002    CARPAL TUNNEL RELEASE Left 2003    CERVICAL [address] SURGERY  10/25/2004    anterior cervical diskectomy C7-T1    CERVICAL DISC SURGERY  2004    anterior cervical discectomy B8-3 (complicated by damaged nerve to vocal cord)    FRACTURE SURGERY Left 2018    ORIF wrist    HUMERUS FRACTURE SURGERY Right 10/03/2010    HYSTERECTOMY (CERVIX STATUS UNKNOWN)  1991    KNEE ARTHROSCOPY Right 2011    KNEE SURGERY Right 2016    repair distal portion of knee arthroplasty due to prosthesis loosening    LUMBAR [address] SURGERY  02/15/1994 as cause of accidental injury in home as place of occurrence, initial encounter W19. 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 (HCC) G93.89, S06.2X0S       Isolation/Infection:   Isolation            No Isolation          Patient Infection Status       Infection Onset Added Last Indicated Last Indicated By Review Planned Expiration Resolved Resolved By    None active    Resolved    COVID-19 (Rule Out) 22 COVID-19, Rapid (Ordered)   22 Rule-Out Test Resulted    COVID-19 (Rule Out) 22 Respiratory Panel, Molecular, with COVID-19 (Restricted: peds pts or suitable admitted adults) (Ordered)   22 Rule-Out Test Resulted    COVID-19 (Rule Out) 22 COVID-19, Rapid (Ordered)   22 Rule-Out Test Resulted    COVID-19 (Rule Out) 22 Respiratory Panel, Molecular, with COVID-19 (Restricted: peds pts or suitable admitted adults) (Ordered)   22 Rule-Out Test Resulted    COVID-19 (Rule Out) 01/15/22 01/15/22 01/15/22 COVID-19 (Ordered)   22 Rule-Out Test Resulted    COVID-19 (Rule Out) 01/15/22 01/15/22 01/15/22 COVID-19, Rapid (Ordered)   01/15/22 Rule-Out Test Resulted    COVID-19 20 COVID-19   21     S/s     COVID-19 (Rule Out) 20 COVID-19 (Ordered)   20 Rule-Out Test Resulted    COVID-19 (Rule Out) 10/22/20 10/22/20 10/22/20 COVID-19, PCR (Ordered)   10/22/20 Rule-Out Test Resulted    COVID-19 (Rule Out) 20 COVID-19, PCR (Ordered)   20 Rule-Out Test Resulted            Nurse Assessment:  Last Vital Signs: BP (!) 162/74   Pulse 82   Temp 97.5 °F (36.4 °C) (Temporal)   Resp 15   Ht 5' 5\" (1.651 m)   Wt 153 lb 14.1 oz (69.8 kg)   SpO2 99%   BMI 25.61 kg/m²     Last documented pain score (0-10 scale): Pain Level: 4  Last Weight:   Wt Readings from Last 1 Encounters:   22 153 lb 14.1 oz (69.8 kg)     Mental Status:  oriented and alert    IV Access:  - None    Nursing Mobility/ADLs:  Walking   Assisted  Transfer  Assisted  Bathing  Assisted  Dressing  Assisted  Toileting  Assisted  Feeding  Assisted  Med Admin  Assisted  Med Delivery   crushed    Wound Care Documentation and Therapy:  Wound 08/22/22 Face Left;Upper (Active)   Wound Etiology Skin Tear 08/23/22 0400   Dressing Status Other (Comment) 08/23/22 0400   Dressing/Treatment Open to air 08/23/22 0400   Wound Assessment Bleeding;Superficial 08/23/22 0400   Drainage Amount Scant 08/23/22 0400   Drainage Description Sanguinous 08/23/22 0400   Odor None 08/23/22 0400   Margins Attached edges 08/23/22 0400   Number of days: 0       Incision 12/20/18 Wrist Left (Active)   Number of days: 1341        Elimination:  Continence: Bowel: Yes  Bladder: Yes  Urinary Catheter: None   Colostomy/Ileostomy/Ileal Conduit: No       Date of Last BM: 8/30/22      Intake/Output Summary (Last 24 hours) at 8/23/2022 0920  Last data filed at 8/23/2022 3053  Gross per 24 hour   Intake 439.09 ml   Output 750 ml   Net -310.91 ml     I/O last 3 completed shifts: In: 372.2 [I.V.:146.6; IV Piggyback:225.5]  Out: 750 [Urine:750]    Safety Concerns: At Risk for Falls    Impairments/Disabilities:      Hearing    Nutrition Therapy:  Current Nutrition Therapy:   - Tube Feedings:  Immune Enhancing bolus feeding 250 mL 5 times/day, 100 mL water flush after    Routes of Feeding: Nasogastric  Liquids: No Liquids  Daily Fluid Restriction: no  Last Modified Barium Swallow with Video (Video Swallowing Test): done on 8/30/22/***    Treatments at the Time of Hospital Discharge:   Respiratory Treatments: yes  Oxygen Therapy:  is on oxygen at *** L/min per nasal cannula.   Ventilator:    - No ventilator support    Rehab Therapies: Physical Therapy, Occupational Therapy, and Speech/Language Therapy  Weight Bearing Status/Restrictions: No weight bearing restrictions  Other Medical Equipment (for information only, NOT a DME order):  walker  Other Treatments: ***    Patient's personal belongings (please select all that are sent with patient):  Glasses, Dentures upper and lower    RN SIGNATURE:  Electronically signed by Bharat Lamb RN on 8/30/22 at 2:32 PM EDT    CASE MANAGEMENT/SOCIAL WORK SECTION    Inpatient Status Date: ***    Readmission Risk Assessment Score:  Readmission Risk              Risk of Unplanned Readmission:  21           Discharging to Facility/ Agency   Name: [facility]  Address: [address] [place] [address]  Phone: [phone]      / signature: Electronically signed by Brian Mar RN on 8/30/22 at 1:40 PM EDT    PHYSICIAN SECTION    Prognosis: Good    Condition at Discharge: Stable    Rehab Potential (if transferring to Rehab): {Prognosis:[id number]}    Recommended Labs or Other Treatments After Discharge: Expectation is improvement in swallow- she can dc with ng tube with plan on wey swallow study x 2 at [facility] and if still not on oral diet-> G tube placement     Physician Certification: I certify the above information and transfer of Mary Becker  is necessary for the continuing treatment of the diagnosis listed and that she requires [facility] for less 30 days.      Update Admission H&P: No change in H&P    PHYSICIAN SIGNATURE:  Electronically signed by RYAN Davila CNP on 8/23/22 at 9:20 AM EDT

## 2022-08-23 NOTE — PLAN OF CARE
Pt assessed as a fall risk this shift. Remains free from falls and accidental injury at this time. Fall precautions in place, including falling star sign and fall risk band on pt. Floor free from obstacles, and bed is locked and in lowest position. Adequate lighting provided. Pt encouraged to call before getting Out Of Bed for any need. Bed alarm activated. Will continue to monitor needs during hourly rounding, and reinforce education on use of call light. Pain level assessed q shift and PRN. Patient able to state tolerable level of pain. PRN meds given per patient request. Pain reassessed after pain interventions. Will continue to monitor patient. Patient tries to pull on lines and pull on tubes. Restraints order placed and bilateral restraints initiated.   Monitor and assess for continuing need for restraints,

## 2022-08-23 NOTE — OP NOTE
Operative Note      Patient: Carolina Coombs  YOB: 1945  MRN: [id number]    Date of Procedure: 8/23/2022    Pre-Op Diagnosis: Subdural hematoma ([address] [T89.0R8N]; midline shift of the brain due to hematoma    Post-Op Diagnosis: Same    Indications for procedure. Patient with significant left-sided extra-axial mass lesion and brain compression with resultant progressive somnolence as well as emesis. Serial CT showed expansion of the bleed       Procedure  Left-sided craniotomy for evacuation of subdural hematoma    Surgeon(s):  Murtaza Barnard DO    Assistant:   First Assistant: Minh Momin    Anesthesia: General    Estimated Blood Loss (mL): less than 528     Complications: None    Specimens:   ID Type Source Tests Collected by Time Destination   1 : URINE - LUNA INSERTION Urine Urine, indwelling catheter CULTURE, URINE Yessica Lewis DO 8/23/2022 1435        Implants:  Implant Name Type Inv. Item Serial No.  Lot No. LRB No. Used Action   CLAMP SURG [id number] [employer]AN [employer] LO PROF - [id number]  CLAMP SURG [id number] [employer] LO PROF  [employer] [id number] Left 1 Implanted   CLAMP SURG [id number] [employer] LO PROF - [id number]  CLAMP SURG [id number] [employer] LO PROF  [employer] [id number] Left 1 Implanted   CLAMP SURG [id number] [employer] LO PROF - [id number]  CLAMP SURG [id number] [employer]AN [employer] LO PROF  [employer] [id number] Left 1 Implanted   CLAMP SURG [id number] [employer]AN [employer] LO PROF - [id number]  CLAMP SURG [id number] [employer] LO PROF  [employer] [id number] Left 1 Implanted         Drains:   Closed/Suction Drain Left Other (Comment); Scalp Bulb (Active)       Urinary Catheter 08/23/22 Other (comment); Luna (Active)       Findings: Thick left-sided subdural hematoma with midline shift    Detailed Description of Procedure: The patient was consented preoperatively.   She was brought into the operative room and placed under general anesthesia. She was placed supine with left-sided shoulder bump and turned to the right side 60 degrees. Left hemicranium was shaved in a frontotemporal flap was drawn out base of the zygoma. After sterile prepping draping and timeout was performed I infiltrated the incision with 1% lidocaine with epinephrine 10 blade was used to score the incision followed by Bovie regularly down to the bone. Myocutaneous flap was reflected anteriorly and held in place with fishhooks.  was used to make multiple bur holes around the periphery followed by Monisha Das 3 to obtain the epidural space and clear it. B1 folate was used to connect the bur holes and turn the flap. The flap was then dissected off of the dura using a Judy and Penfield 3. Bone flap was placed in Betadine on the back table. Thorough irrigation was used to remove any bone dust.  Circumferentially the epidural edges were packed with Gelfoam and the bone edges back with bone wax to obtain hemostasis. After thorough irrigation the dura was tented and incised sharply with a 15 blade. Careful cruciate opening of the dura was performed with leaflets reflected back and hematoma identified. Gentle irrigation along suction was used to remove the subdural hematoma circumferentially. Any areas of bleeding were controlled with bipolar cautery. Circumferentially the subdural space was packed with Floseal and patties. Thorough irrigation noted no active bleeding and clot fully evacuated. Leaflets were then reflected back over the brain. Vistaseal was sprayed over the surface of the brain and dura to obtain hemostasis. Bone flap was then reaffixed using multiple CranioFIX. 7 flat JEOVANNY drain was tunneled subgaleal and secured with a drain stitch. The wound was closed with interrupted 2-0 Vicryl sutures for the superficial temporalis fascia as well as for the galea.   Staples were used for the skin.  Bacitracin and island are placed over the wound after a drain stitch was placed. Patient was kept intubated per anesthesia discretion and transferred.     Electronically signed by Philip Aguilera DO on 8/23/2022 at 5:07 PM

## 2022-08-23 NOTE — PROGRESS NOTES
Physician Progress Note      PATIENT:               Mc Mckeon  CSN #:                  [id number]  :                       1945  ADMIT DATE:       2022 6:53 PM  DISCH DATE:  RESPONDING  PROVIDER #:        Ml Grant CNP          QUERY TEXT:    Pt admitted with traumatic SDH s/p fall. Pt noted to have [address] showing mass   effect. If clinically significant, please document in progress notes and   discharge summary if you are evaluating/treating any of the following: The medical record reflects the following:  Risk Factors: [address], traumatic SDH Hs A fib on Xarelto  Clinical Indicators: CTH showing acute left cerebral convexity subdural   hematoma and 6 mm rightward shift of midline. Per NS consult note: Left acute   subdural hematoma with midline shift, repeat scan with some enlargement. Patient newly nauseous this am and worsened shift at 6mm so will consider   evacuating. Treatment: NS consult, Possible OR/evacuation of SDH, ICU, CTH, Neuro checks,   K[facility], DDAVP, labs/monitoring    Thank-you,  Chanelle Cooper RN, CDS  [email or web address]  Options provided:  -- Cerebral edema  -- Brain compression  -- Cerebral edema and Brain compression  -- Traumatic brain compression  -- Other - I will add my own diagnosis  -- Disagree - Not applicable / Not valid  -- Disagree - Clinically unable to determine / Unknown  -- Refer to Clinical Documentation Reviewer    PROVIDER RESPONSE TEXT:    This patient has traumatic brain compression.     Query created by: Anaya Guillermo on 2022 12:52 PM      Electronically signed by:  Nura Grant CNP 2022 1:45 PM

## 2022-08-23 NOTE — PLAN OF CARE
Problem: Discharge Planning  Goal: Discharge to home or other facility with appropriate resources  Outcome: Progressing     Problem: Pain  Goal: Verbalizes/displays adequate comfort level or baseline comfort level  Outcome: Progressing  Flowsheets (Taken 8/23/2022 0400 by Jurgen Sands RN)  Verbalizes/displays adequate comfort level or baseline comfort level: Assess pain using appropriate pain scale     Problem: Skin/Tissue Integrity  Goal: Absence of new skin breakdown  Description: 1. Monitor for areas of redness and/or skin breakdown  2. Assess vascular access sites hourly  3. Every 4-6 hours minimum:  Change oxygen saturation probe site  4. Every 4-6 hours:  If on nasal continuous positive airway pressure, respiratory therapy assess nares and determine need for appliance change or resting period.   Outcome: Progressing     Problem: Safety - Adult  Goal: Free from fall injury  Outcome: Progressing  Flowsheets (Taken 8/23/2022 0945)  Free From Fall Injury: Instruct family/caregiver on patient safety     Problem: ABCDS Injury Assessment  Goal: Absence of physical injury  Outcome: Progressing  Flowsheets (Taken 8/23/2022 0945)  Absence of Physical Injury: Implement safety measures based on patient assessment     Problem: Chronic Conditions and Co-morbidities  Goal: Patient's chronic conditions and co-morbidity symptoms are monitored and maintained or improved  Outcome: Progressing

## 2022-08-23 NOTE — ANESTHESIA PROCEDURE NOTES
Arterial Line:    An arterial line was placed using surface landmarks, in the OR for the following indication(s): Beatae Ratori A 20 gauge (size), 4.45 cm (length), Arrow (type) catheter was placed, Seldinger technique used, into the right radial artery, secured by Tegaderm and tape. Anesthesia type: General    Events:  patient tolerated procedure well with no complications. 8/23/2022 1:50 PM  Anesthesiologist: Rusty Smith MD  Performed: Anesthesiologist   Preanesthetic Checklist  Completed: patient identified, IV checked, site marked, risks and benefits discussed, surgical/procedural consents, equipment checked, pre-op evaluation, timeout performed, anesthesia consent given, oxygen available, monitors applied/VS acknowledged, fire risk safety assessment completed and verbalized and blood product R/B/A discussed and consented

## 2022-08-23 NOTE — CONSULTS
Department of Neurosurgery                                                             Consult Note      Reason for Consult:    Requesting Physician:    Neurosurgeon:   [x] Dr. Yvette Preciado   [] Dr. Tsering Wei  [] Dr. Ulices Hong, [facility]    History Obtained From:  patient and EMR     CHIEF COMPLAINT:         Fall     HISTORY OF PRESENT ILLNESS:       The patient is a 69 y.o. female that presented to ED after experiencing a fall. Patient stood up from a standing position and subsequently fell forward, striking her head in a forward fall. Denies LOC. She has a past medical history of A-fib on Xarelto, CAD on ASA. Patient complained of headache when seen and examined at bedside. She denied weakness or paresthesia.      PAST MEDICAL HISTORY :       Past Medical History:        Diagnosis Date    Bronchiectasis with acute exacerbation ([facility] [address] 04/29/2022    Chronic pain syndrome     dilaudid infusion pump    COPD (chronic obstructive pulmonary disease) (HCC)     Depression     GERD (gastroesophageal reflux disease)     Hypothyroidism     Impaired fasting glucose     Osteoporosis     Pulmonary fibrosis ([facility] [address] 04/29/2022       Past Surgical History:        Procedure Laterality Date    BACK SURGERY  09/09/1998    spinal cord stimulator    BACK SURGERY  08/04/1999    infusion pump insertion    BACK SURGERY  10/23/2003    spinal cord stimulator removed    BACK SURGERY  10/23/2003    new infusion pump placed    BACK SURGERY  09/11/2017    replaced infusion pump    CARPAL TUNNEL RELEASE Right 12/17/1999    CARPAL TUNNEL RELEASE Left 11/24/1999    CARPAL TUNNEL RELEASE Right 12/30/2002    CARPAL TUNNEL RELEASE Left 12/17/2003    CERVICAL [address] SURGERY  10/25/2004    anterior cervical diskectomy C7-T1    CERVICAL DISC SURGERY  12/22/2004    anterior cervical discectomy A0-1 (complicated by damaged nerve to vocal cord)    FRACTURE SURGERY Left 12/20/2018    ORIF wrist    HUMERUS FRACTURE SURGERY Right 10/03/2010    HYSTERECTOMY (CERVIX STATUS UNKNOWN)  1991    KNEE ARTHROSCOPY Right 2011    KNEE SURGERY Right 2016    repair distal portion of knee arthroplasty due to prosthesis loosening    LUMBAR DISC SURGERY  02/15/1994    due to scar tissue from previous surgery    LUMBAR DISCECTOMY  1993    L5-S1    LUMBAR LAMINECTOMY  2008    L3-4-5    NECK SURGERY  2002    posterior plate fusion    NECK SURGERY  2005    reconnect nerve to vocal cord [facility]    TOE AMPUTATION  10/08/2006    left 3rd toe - osteomyelitis    TOE AMPUTATION  2008    left 4th toe partial amputation    TOE AMPUTATION  10/03/2008    left 2nd toe    TOTAL KNEE ARTHROPLASTY Right 2021    WRIST FRACTURE SURGERY Left 2018    WRIST OPEN REDUCTION INTERNAL FIXATION-DISTAL RADIUS performed by Miracle Marlow MD at [facility] Left 2018    WRIST SURGERY Left 2019    left wrist ulnar shortening osteotomy       Social History:   Social History     Socioeconomic History    Marital status:      Spouse name: Not on file    Number of children: Not on file    Years of education: Not on file    Highest education level: Not on file   Occupational History    Not on file   Tobacco Use    Smoking status: Former     Packs/day: 1.00     Years: 10.00     Pack years: 10.00     Types: Cigarettes     Quit date: 1976     Years since quittin.8    Smokeless tobacco: Never   Vaping Use    Vaping Use: Never used   Substance and Sexual Activity    Alcohol use: No    Drug use: No    Sexual activity: Not on file   Other Topics Concern    Not on file   Social History Narrative    Not on file     Social Determinants of Health     Financial Resource Strain: Low Risk     Difficulty of Paying Living Expenses: Not hard at all   Food Insecurity: No Food Insecurity    Worried About [address] [employer] in the Last Year: Never true    [address] [employer] in the Last Year: Never true   Transportation Needs: Not on file   Physical Activity: Inactive    Days of Exercise per Week: 0 days    Minutes of Exercise per Session: 0 min   Stress: Not on file   Social Connections: Not on file   Intimate Partner Violence: Not on file   Housing Stability: Not on file       Family History:       Problem Relation Age of Onset    Heart Attack Father 61    Heart Attack Sister     Heart Disease Brother        Allergies:  Patient has no known allergies. Home Medications:  Prior to Admission medications    Medication Sig Start Date End Date Taking? Authorizing Provider   mupirocin (BACTROBAN) 2 % cream Apply 3 times daily. 8/17/22 9/16/22  RYAN Allen CNP   alendronate (FOSAMAX) 70 MG tablet Take 1 tablet by mouth every 7 days On Sundays 8/15/22 11/13/22  Vamshi Fuller MD   pantoprazole sodium (PROTONIX) 40 MG PACK packet Take 1 packet by mouth in the morning and 1 packet in the evening. Take before meals. 8/15/22   Vamshi Fuller MD   sulfamethoxazole-trimethoprim (BACTRIM DS) 800-160 MG per tablet Take 1 tablet by mouth in the morning and 1 tablet before bedtime. Do all this for 10 days.  8/12/22 8/22/22  RYAN Allen CNP   traZODone (DESYREL) 100 MG tablet Take 1 tablet by mouth nightly 2 tabs at nighttime 8/2/22   Vamshi Fuller MD   sertraline (ZOLOFT) 50 MG tablet Take 1 tablet by mouth daily 6/27/22   Vamshi Fuller MD   levothyroxine (SYNTHROID) 200 MCG tablet Take 1 tablet by mouth Daily 6/27/22   Vamshi Fuller MD   guaiFENesin-dextromethorphan [facility] DM) 100-10 MG/5ML syrup Take 5 mLs by mouth every 4 hours (while awake) 5/2/22   RYAN Huynh CNP   hydroxychloroquine (PLAQUENIL) 200 MG tablet Take 300 mg by mouth daily    Historical Provider, MD   XARELTO 20 MG TABS tablet TAKE 1 TABLET BY MOUTH  DAILY WITH BREAKFAST 4/8/22   Clint Fernandez MD   ipratropium-albuterol (DUONEB) 0.5-2.5 (3) MG/3ML SOLN nebulizer solution 3 mLs 11/24/21   Historical Provider, MD   umeclieliceoium-vilanterol [facility] ELLIPTA) 62.5-25 MCG/INH AEPB inhaler Inhale 1 puff into the lungs daily 3/16/22   RYAN Marquez - CNP   albuterol sulfate  (90 Base) MCG/ACT inhaler Inhale 2 puffs into the lungs 4 times daily 3/16/22   RYAN Marquez CNP   dextrose 5 % SOLN with HYDROmorphone HCl PF 10 MG/ML SOLN 1 mg/mL Infuse intravenously continuous Pt has continuous dilaudid infusion pump implanted, but is unsure of dosage. 2.797 mg/day    Historical Provider, MD   furosemide (LASIX) 20 MG tablet Take 1 tablet by mouth daily  Patient taking differently: Take 20 mg by mouth daily PRN 5/14/21   Bradford Escalona MD   HYDROcodone-acetaminophen [facility])  MG per tablet Take 1 tablet by mouth every 6 hours as needed for Pain. Historical Provider, MD   pregabalin (LYRICA) 300 MG capsule Take 1 capsule by mouth 2 times daily for 30 days.  11/6/20 8/22/22  Johnny Hernandez MD   aspirin 81 MG tablet Take 81 mg by mouth daily    Historical Provider, MD   DULoxetine (CYMBALTA) 60 MG extended release capsule Take 60 mg by mouth daily     Historical Provider, MD   potassium chloride (KLOR-CON) 20 MEQ packet Take 20 mEq by mouth 2 times daily    Historical Provider, MD   calcium citrate-vitamin D (CITRICAL + D) 315-250 MG-UNIT TABS per tablet Take 1 tablet by mouth 2 times daily (with meals)    Historical Provider, MD       Current Medications:   Current Facility-Administered Medications: sodium chloride flush 0.9 % injection 5-40 mL, 5-40 mL, IntraVENous, 2 times per day  sodium chloride flush 0.9 % injection 5-40 mL, 5-40 mL, IntraVENous, PRN  0.9 % sodium chloride infusion, , IntraVENous, PRN  ondansetron (ZOFRAN-ODT) disintegrating tablet 4 mg, 4 mg, Oral, Q8H PRN **OR** ondansetron (ZOFRAN) injection 4 mg, 4 mg, IntraVENous, Q6H PRN  polyethylene glycol (GLYCOLAX) packet 17 g, 17 g, Oral, Daily  0.9 % sodium chloride infusion, , IntraVENous, Continuous  acetaminophen (TYLENOL) tablet 1,000 mg, 1,000 mg, Oral, 3 times per day  famotidine (PEPCID) tablet 20 mg, 20 mg, Oral, BID  oxyCODONE (ROXICODONE) immediate release tablet 2.5 mg, 2.5 mg, Oral, Q6H PRN  levETIRAcetam (KEPPRA) 500 mg/100 mL IVPB, 500 mg, IntraVENous, Q12H  prothrombin complex concentrate (human) (KCENTRA) infusion 2,000 Units, 2,000 Units, IntraVENous, Once **FOLLOWED BY** 0.9 % sodium chloride infusion, 50 mL, IntraVENous, Once    REVIEW OF SYSTEMS:       CONSTITUTIONAL: negative for fatigue and malaise   EYES: negative for double vision and photophobia    HEENT: negative for tinnitus and sore throat   RESPIRATORY: negative for cough, shortness of breath   CARDIOVASCULAR: negative for chest pain, palpitations   GASTROINTESTINAL: negative for nausea, vomiting   GENITOURINARY: negative for incontinence   MUSCULOSKELETAL: negative for neck or back pain   NEUROLOGICAL: negative for seizures   PSYCHIATRIC: negative for agitated     Review of systems otherwise negative. PHYSICAL EXAM:       BP (!) 138/96   Pulse 75   Temp 99 °F (37.2 °C)   Resp 13   SpO2 92%       CONSTITUTIONAL: no apparent distress, appears stated age   HEAD: normocephalic, atraumatic   ENT: moist mucous membranes, uvula midline   NECK: supple, symmetric, no midline tenderness to palpation. In C-spine collar.     BACK: without midline tenderness, step-offs or deformities   LUNGS: clear to auscultation bilaterally   CARDIOVASCULAR: regular rate and rhythm   ABDOMEN: Soft, non-tender, non-distended with normal active bowel sounds   NEUROLOGIC:  EYE OPENING     Spontaneous - 4 [x]       To voice - 3 []       To pain - 2 []       None - 1 []    VERBAL RESPONSE     Appropriate, oriented - 5 [x]       Dazed or confused - 4 []       Syllables, expletives - 3 []       Grunts - 2 []       None - 1 []    MOTOR RESPONSE     Spontaneous, command - 6 [x]       Localizes pain - 5 []       Withdraws pain - 4 []       Abnormal flexion - 3 []       Abnormal extension - 2 [] None - 1 []            Total GCS: 15    Mental Status:  A/O x 4. Cranial Nerves:    cranial nerves II-XII are grossly intact    Motor Exam:    Drift:  absent  Tone:  normal    Motor exam is symmetrical 5 out of 5 all extremities bilaterally    Sensory:    Right Upper Extremity:  normal  Left Upper Extremity:  normal  Right Lower Extremity:  normal  Left Lower Extremity:  normal    Deep Tendon Reflexes:    Right Bicep:  2+  Left Bicep:  2+  Right Knee:  2+  Left Knee:  2+    Plantar Response:    Right:  downgoing  Left:  downgoing    Clonus:  absent  Todd's:  absent    Gait:  Deferred. SKIN: no rash       LABS AND IMAGING:     CBC with Differential:    Lab Results   Component Value Date/Time    WBC 6.4 08/22/2022 07:14 PM    RBC 4.40 08/22/2022 07:14 PM    HGB 13.6 08/22/2022 07:14 PM    HCT 43.2 08/22/2022 07:14 PM     08/22/2022 07:14 PM    MCV 98.2 08/22/2022 07:14 PM    MCH 30.9 08/22/2022 07:14 PM    MCHC 31.5 08/22/2022 07:14 PM    RDW 13.7 08/22/2022 07:14 PM    METASPCT 1 08/20/2020 05:47 AM    LYMPHOPCT 22 08/22/2022 05:30 PM    MONOPCT 8 08/22/2022 05:30 PM    BASOPCT 1 08/22/2022 05:30 PM    MONOSABS 0.48 08/22/2022 05:30 PM    LYMPHSABS 1.39 08/22/2022 05:30 PM    EOSABS 0.07 08/22/2022 05:30 PM    BASOSABS 0.03 08/22/2022 05:30 PM    DIFFTYPE NOT REPORTED 02/10/2022 06:05 AM     BMP:    Lab Results   Component Value Date/Time     08/22/2022 07:14 PM    K 3.6 08/22/2022 07:14 PM     08/22/2022 07:14 PM    CO2 26 08/22/2022 07:14 PM    BUN 12 08/22/2022 07:14 PM    LABALBU 4.4 06/20/2022 10:00 AM    CREATININE 0.56 08/22/2022 07:14 PM    CALCIUM 10.0 08/22/2022 05:30 PM    GFRAA >60 08/22/2022 07:14 PM    LABGLOM >60 08/22/2022 07:14 PM    GLUCOSE 105 08/22/2022 07:14 PM       Radiology Review:        CT HEAD WO CONTRAST    Result Date: 8/23/2022  Stable acute left cerebral convexity subdural hematoma and 6 mm rightward shift of midline.   No new hemorrhage or evidence of acute infarct. CT HEAD WO CONTRAST    Result Date: 8/22/2022  Left holo-hemispheric subdural hematoma with a maximum depth of approximately 18 mm. Suggestion of mild subarachnoid hemorrhage. Critical results were called by Dr. Lillie Swanson MD to Dr. Tc Spence on 8/22/2022 at 17:26. CT CERVICAL SPINE WO CONTRAST    Result Date: 8/22/2022  No acute abnormality of the cervical spine. CT CHEST ABDOMEN PELVIS W CONTRAST    Result Date: 8/22/2022  1. Interval improvement in the bilateral pulmonary consolidation from prior examination. There is some persistent consolidation and ground-glass in the upper lobes. 2. Mediastinal lymphadenopathy. 3. No acute intra-abdominal abnormality. 4. Cholelithiasis without cholecystitis. 5. Dilation of the common bile duct without obstructing stone or lesion identified, nonspecific. 6. Moderate hiatal hernia. CT LUMBAR SPINE TRAUMA RECONSTRUCTION    Result Date: 8/22/2022  1. No acute abnormality in the thoracic or lumbar spines. 2.  Multilevel degenerative change. CT THORACIC SPINE TRAUMA RECONSTRUCTION    Result Date: 8/22/2022  1. No acute abnormality in the thoracic or lumbar spines. 2.  Multilevel degenerative change. ASSESSMENT AND PLAN:       Patient Active Problem List   Diagnosis    Hypothyroidism    Major depressive disorder    Chronic midline low back pain without sciatica    Iron deficiency anemia    COPD (chronic obstructive pulmonary disease) (HCC)    Biventricular congestive heart failure (HCC)    Anemia    New onset a-fib (HCC)    Pulmonary fibrosis (HCC)    SDH (subdural hematoma) ([facility] [address]    Fall as cause of accidental injury in home as place of occurrence, initial encounter         A/P:  This is a 69 y.o. female with hx of A-fib on Xarelto and CAD on ASA that experienced a fall. Patient fell forward from a standing position. She did not lose consciousness.    CT head w/o contrast revealed subdural hematoma with right to left midline shift 5 mm, which increased to 6 mm on repeat CT head at 12 am.     Patient is maintaining GCS 15. No acute neurological changes, monitor with repeat CT head. Case discussed with Dr. Lai Vogel. Patient care will be discussed with attending, will reevaluated patient along with attending.      - No neurosurgical interventions planned for now  - HOB: 30 degrees   - Obtain CT head at 6 am (previous one done at 12 am)   - Neuro checks q 1 hour   - Hold all antiplatelets and anticoagulants  -- We recommend SBP < 140   - Determine the lower limit of SBP clinically based on mentation      Additional recommendations may follow    Please contact neurosurgery with any changes in patients neurologic status. Thank you for your consult.        Carrie Noguera DO   [place] pager [phone]  8/22/2022  9:09 PM

## 2022-08-23 NOTE — DISCHARGE INSTRUCTIONS
Discharge Instructions for Trauma         What to do after you leave the hospital:  General questions or concerns may be called to the trauma nurse line at [phone] and please leave a message. Trauma is a life-threatening condition. Your doctor will want to closely monitor you. Be sure to go to all of your appointments. Bathing: if you have sutures or staples in place, you may shower. No tub baths, soaking or submerging yourself in water. No hot tubs or swimming. Sutures or Staple Care: Your sutures or staples will need to be removed on or around 9/8/2022. Please continue to use your Incentive Spirometer as directed. You can practice 10 deep breaths/hour while awake. Using the [address] will promote the health of your lungs by taking slow, deep breaths in. It is also important in preventing pneumonia or a pneumothorax from developing. You sustained a head injury during your recent traumatic event. Please refrain from any activities that could put you at risk for further injury to your head as you heal over the next 3-4 weeks (such as ladders, contact sports, 4-knight/ATV activities, etc.) You received a cognitive evaluation while hospitalized-follow all instructions given by the speech-language pathologist.   For additional support and resources for you and your family contact the [facility] [place] at [phone]. This center offers additional therapy, support groups, education and other resources at no cost. The center is located at [address] [facility]. [facility], [address] [place] [address]. You can also visit www.[facility].org for more information. Craniotomy Discharge Instructions     Thank you for choosing [facility] and [facility] for your surgical needs. The following instructions will help to ensure your comfort and that you are well prepared after your surgery.        Post-Operative Visit:   The office is located at:    [address], [address]    [facility], [address] [facility], main floor    [place], [address] [place] [address]    [phone]     Please also call your primary care physician to schedule an appointment for further evaluation and care. Diet:   You may resume your regular diet   Be sure to eat a well-balanced diet. Protein promotes wound healing. Pain medication and decreased activity can cause constipation. Drink 8-10 glasses of water a day, eat fresh fruits and vegetables, and add prunes, raisins and bran cereals to your diet if you do become constipated. A stool softener taken 1-2 times a day is helpful. Dulcolax suppositories or Fleets enemas are also available without a prescription. Call our office if the problem continues. Activity and Exercise:   No lifting greater than 5 pounds (gallon of milk) for the first few weeks after surgery. No driving until you are seen in the office. If you have had a seizure or have visual deficits, you may not drive until cleared by a neurologist.   Avoid riding in a car for the first two weeks until you come to the office for your scheduled follow-up. Start taking short, frequent walks in the beginning. Gideon, more frequent walks throughout the day are more beneficial than one long walk each day. You may gradually increase the distance; as tolerated. If your pain increases, you may be walking too much or too far. Try backing off for a day or two and then resume slowly. No baths, swimming or hot tub until you discuss this with your doctor. Incision Care and Hygiene: Your incision is closed with staples or sutures and should be removed about 2 weeks after surgery. If they are not removed please call the clinic to have them removed. It is OK to shower 3-4 days after surgery. Let water run over the incision. Gently pat the incision dry with a clean towel, do not rub. Leave incision site open to air.    Do not apply ointments or lotions to the incision site. Pain Management:   You will be given a prescription for pain medication. Our hope is that you will eventually be weaned off all pain medications. Try not to take the pain medicine unless you need to. If you feel that you do not need something that strong, you may use regular or extra-strength Tylenol instead. DO NOT drink alcohol, drive or operate heavy machinery while taking your pain medications. Notify the office if your pain is not controlled or you need a medication refill before your appointment. Seizure Medication:   Your doctor wants you to continue to take Keppra/Levetiracetam. Please take as prescribed. YOU SHOULD CALL THE OFFICE -[phone] IF YOU HAVE ANY OF THE FOLLOWING:   Severe or worsening headaches. Ongoing nausea and/or vomiting. If you notice any signs of infection such as bleeding, redness, swelling, tenderness, odor, drainage or opening of the incision. Please check your incisions twice daily. Fevers greater than 101.5 degrees   Clear fluid leaking from the incision. Flu-like symptoms, chills, shakes, chest pain, shortness of breath, nausea, vomiting, diarrhea   Changes in mental status such as confusion, slurred speech, increased sleepiness. Any new neurologic sensory or motor deficits (weakness, numbness)   Seizures   Leg swelling or calf tenderness     *If you are unable to contact someone at the office and your symptoms persist or increase, call [phone] or go to the emergency department.

## 2022-08-23 NOTE — ED NOTES
Pt comes to ED via [facility] as transfer from [place] [address]. [facility] states patient had a fall at noon, was witnessed by , has laceration on left eyebrow which was sutured at [place], has left chest and knee pain, but has no neuro deficits. Pt arrives with c-collar and left AC 20-gauge IV.      Louis Reza RN  08/22/22 2022

## 2022-08-23 NOTE — ANESTHESIA PRE PROCEDURE
Department of Anesthesiology  Preprocedure Note       Name:  Stacey Johnson   Age:  69 y.o.  :  1945                                          MRN:  [id number]         Date:  2022      Surgeon: Martha Signs):  Anthony Gomez DO    Procedure: Procedure(s):  CRANIOTOMY HEMATOMA EVACUATION    Medications prior to admission:   Prior to Admission medications    Medication Sig Start Date End Date Taking? Authorizing Provider   mupirocin (BACTROBAN) 2 % cream Apply 3 times daily. 22  RYAN Rodriguez CNP   alendronate (FOSAMAX) 70 MG tablet Take 1 tablet by mouth every 7 days On Sundays 8/15/22 11/13/22  Shauna Santos MD   pantoprazole sodium (PROTONIX) 40 MG PACK packet Take 1 packet by mouth in the morning and 1 packet in the evening. Take before meals.  8/15/22   Shauna Santos MD   traZODone (DESYREL) 100 MG tablet Take 1 tablet by mouth nightly 2 tabs at nighttime 22   Shauna Santos MD   sertraline (ZOLOFT) 50 MG tablet Take 1 tablet by mouth daily 22   Shauna Santos MD   levothyroxine (SYNTHROID) 200 MCG tablet Take 1 tablet by mouth Daily 22   Shauna Santos MD   guaiFENesin-dextromethorphan [facility] DM) 100-10 MG/5ML syrup Take 5 mLs by mouth every 4 hours (while awake) 22   RYAN Coreas CNP   hydroxychloroquine (PLAQUENIL) 200 MG tablet Take 300 mg by mouth daily    Historical Provider, MD   XARELTO 20 MG TABS tablet TAKE 1 TABLET BY MOUTH  DAILY WITH BREAKFAST 22   Chente Ocampo MD   ipratropium-albuterol (DUONEB) 0.5-2.5 (3) MG/3ML SOLN nebulizer solution 3 mLs 21   Historical Provider, MD   umeclidinium-vilanterol (ANORO ELLIPTA) 62.5-25 MCG/INH AEPB inhaler Inhale 1 puff into the lungs daily 3/16/22   RYAN Allison CNP   albuterol sulfate  (90 Base) MCG/ACT inhaler Inhale 2 puffs into the lungs 4 times daily 3/16/22   Roni Almanza, APRN - CNP   dextrose 5 % SOLN with HYDROmorphone HCl PF 10 MG/ML SOLN 1 mg/mL Infuse intravenously continuous Pt has continuous dilaudid infusion pump implanted, but is unsure of dosage. 2.797 mg/day    Historical Provider, MD   furosemide (LASIX) 20 MG tablet Take 1 tablet by mouth daily  Patient taking differently: Take 20 mg by mouth daily PRN 5/14/21   Carlos Shah MD   HYDROcodone-acetaminophen [facility])  MG per tablet Take 1 tablet by mouth every 6 hours as needed for Pain. Historical Provider, MD   pregabalin (LYRICA) 300 MG capsule Take 1 capsule by mouth 2 times daily for 30 days.  11/6/20 8/22/22  Yasmin Dickens MD   aspirin 81 MG tablet Take 81 mg by mouth daily    Historical Provider, MD   DULoxetine (CYMBALTA) 60 MG extended release capsule Take 60 mg by mouth daily     Historical Provider, MD   potassium chloride (KLOR-CON) 20 MEQ packet Take 20 mEq by mouth 2 times daily    Historical Provider, MD   calcium citrate-vitamin D (CITRICAL + D) 315-250 MG-UNIT TABS per tablet Take 1 tablet by mouth 2 times daily (with meals)    Historical Provider, MD       Current medications:    Current Facility-Administered Medications   Medication Dose Route Frequency Provider Last Rate Last Admin    scopolamine (TRANSDERM-SCOP) transdermal patch 1 patch  1 patch TransDERmal Q72H Francisco Moraes DO   1 patch at 08/23/22 0359    labetalol (NORMODYNE;TRANDATE) injection 10 mg  10 mg IntraVENous Q6H PRN RYAN Wheeler CNP   10 mg at 08/23/22 1008    albuterol sulfate HFA (PROVENTIL;VENTOLIN;PROAIR) 108 (90 Base) MCG/ACT inhaler 2 puff  2 puff Inhalation 4x Daily RYAN Wheeler CNP        levothyroxine (SYNTHROID) tablet 200 mcg  200 mcg Oral Daily RYAN Wheeler CNP        [START ON 8/24/2022] pantoprazole (PROTONIX) tablet 40 mg  40 mg Oral QAM AC RYAN Wheeler CNP        pregabalin (LYRICA) capsule 300 mg  300 mg Oral BID RYAN Wheeler CNP        niCARdipine (CARDENE) 25 mg in sodium chloride 0.9 % 250 mL infusion  2.5-15 mg/hr IntraVENous Continuous Patel Tee MD 50 mL/hr at 08/23/22 1136 5 mg/hr at 08/23/22 1136    sodium chloride flush 0.9 % injection 5-40 mL  5-40 mL IntraVENous 2 times per day Scheryl Sears, DO   10 mL at 08/23/22 0830    sodium chloride flush 0.9 % injection 5-40 mL  5-40 mL IntraVENous PRN Scheryl Sears, DO        0.9 % sodium chloride infusion   IntraVENous PRN Scheryl Sears, DO        ondansetron (ZOFRAN-ODT) disintegrating tablet 4 mg  4 mg Oral Q8H PRN Scheryl Sears, DO        Or    ondansetron (ZOFRAN) injection 4 mg  4 mg IntraVENous Q6H PRN Scheryl Sears, DO   4 mg at 08/23/22 0338    polyethylene glycol (GLYCOLAX) packet 17 g  17 g Oral Daily Scheryl Sears, DO        0.9 % sodium chloride infusion   IntraVENous Continuous Scheryl Sears,  mL/hr at 08/23/22 0722 Rate Verify at 08/23/22 9563    acetaminophen (TYLENOL) tablet 1,000 mg  1,000 mg Oral 3 times per day Scheryl Sears, DO   1,000 mg at 08/22/22 2056    levETIRAcetam (KEPPRA) 500 mg/100 mL IVPB  500 mg IntraVENous Q12H Scheryl Sears, DO   Stopped at 08/23/22 7007       Allergies:  No Known Allergies    Problem List:    Patient Active Problem List   Diagnosis Code    Hypothyroidism E03.9    Major depressive disorder F32.9    Chronic midline low back pain without sciatica M54.50, G89.29    Iron deficiency anemia D50.9    COPD (chronic obstructive pulmonary disease) ([facility] J44.9    Biventricular congestive heart failure (HCC) I50.82    Anemia D64.9    New onset a-fib ([facility]) I48.91    Pulmonary fibrosis ([facility]) J84.10    Subdural hematoma ([facility] S06.5X9A    Fall as cause of accidental injury in home as place of occurrence, initial encounter W19. Reta Jean, Y92.009    Midline shift of brain due to hematoma ([facility]) G93.89, S06.2X0S       Past Medical History:        Diagnosis Date    [facility]     Biventricular congestive heart failure (HCC)     Bron[facility]ctasis with acute exacerbation [facility] 04/29/2022    CAD (coronary artery disease)     Chronic pain syndrome     dilaudid infusion pump    COPD (chronic obstructive pulmonary disease) ([facility]     Depression     GERD (gastroesophageal reflux disease)     Hypothyroidism     Impaired fasting glucose     Iron deficiency anemia     Osteoporosis     Pulmonary fibrosis ([facility] 04/29/2022    Subdural hematoma ([facility] 08/23/2022       Past Surgical History:        Procedure Laterality Date    BACK SURGERY  09/09/1998    spinal cord stimulator    BACK SURGERY  08/04/1999    infusion pump insertion    BACK SURGERY  10/23/2003    spinal cord stimulator removed    BACK SURGERY  10/23/2003    new infusion pump placed    BACK SURGERY  09/11/2017    replaced infusion pump    CARPAL TUNNEL RELEASE Right 12/17/1999    CARPAL TUNNEL RELEASE Left 11/24/1999    CARPAL TUNNEL RELEASE Right 12/30/2002    CARPAL TUNNEL RELEASE Left 12/17/2003    CERVICAL DISC SURGERY  10/25/2004    anterior cervical diskectomy C7-T1    CERVICAL DISC SURGERY  12/22/2004    anterior cervical discectomy M2-2 (complicated by damaged nerve to vocal cord)    FRACTURE SURGERY Left 12/20/2018    ORIF wrist    HUMERUS FRACTURE SURGERY Right 10/03/2010    HYSTERECTOMY (CERVIX STATUS UNKNOWN)  08/20/1991    KNEE ARTHROSCOPY Right 06/02/2011    KNEE SURGERY Right 02/04/2016    repair distal portion of knee arthroplasty due to prosthesis loosening    LUMBAR [address] SURGERY  02/15/1994    due to scar tissue from previous surgery    LUMBAR DISCECTOMY  08/30/1993    L5-S1    LUMBAR LAMINECTOMY  06/09/2008    L3-4-5    NECK SURGERY  05/03/2002    posterior plate fusion    NECK SURGERY  12/09/2005    reconnect nerve to vocal cord [facility]    TOE AMPUTATION  10/08/2006    left 3rd toe - osteomyelitis    TOE AMPUTATION  03/07/2008    left 4th toe partial amputation    TOE AMPUTATION  10/03/2008    left 2nd toe    TOTAL KNEE ARTHROPLASTY Right 03/19/2021    WRIST FRACTURE SURGERY Left 2018    WRIST OPEN REDUCTION INTERNAL FIXATION-DISTAL RADIUS performed by Augusta Aviles MD at [address] Left 2018    WRIST SURGERY Left 2019    left wrist ulnar shortening osteotomy       Social History:    Social History     Tobacco Use    Smoking status: Former     Packs/day: 1.00     Years: 10.00     Pack years: 10.00     Types: Cigarettes     Quit date: 1976     Years since quittin.8    Smokeless tobacco: Never   Substance Use Topics    Alcohol use: No                                Counseling given: Not Answered      Vital Signs (Current):   Vitals:    22 0645 22 0700 22 0715 22 0909   BP:   (!) 155/76 (!) 162/74   Pulse: 93 83 82    Resp: 20 16 15    Temp:       TempSrc:       SpO2: 95% 99% 99%    Weight:       Height:                                                  BP Readings from Last 3 Encounters:   22 (!) 162/74   22 (!) 159/99   22 (!) 158/84       NPO Status: Time of last liquid consumption:                         Time of last solid consumption:                                                       BMI:   Wt Readings from Last 3 Encounters:   22 153 lb 14.1 oz (69.8 kg)   22 145 lb (65.8 kg)   22 146 lb (66.2 kg)     Body mass index is 25.61 kg/m².     CBC:   Lab Results   Component Value Date/Time    WBC 5.4 2022 07:16 AM    RBC 4.16 2022 07:16 AM    HGB 13.1 2022 07:16 AM    HCT 40.2 2022 07:16 AM    MCV 96.6 2022 07:16 AM    RDW 13.9 2022 07:16 AM     2022 07:16 AM       CMP:   Lab Results   Component Value Date/Time     2022 07:16 AM    K 3.5 2022 07:16 AM    CL 99 2022 07:16 AM    CO2 25 2022 07:16 AM    BUN 10 2022 07:16 AM    CREATININE 0.41 2022 07:16 AM    GFRAA >60 2022 07:16 AM    LABGLOM >60 2022 07:16 AM    GLUCOSE 99 2022 07:16 AM    PROT 7.2 06/20/2022 10:00 AM    CALCIUM 8.8 08/23/2022 07:16 AM    BILITOT 0.36 06/20/2022 10:00 AM    ALKPHOS 73 06/20/2022 10:00 AM    AST 20 06/20/2022 10:00 AM    ALT 11 06/20/2022 10:00 AM       POC Tests:   Recent Labs     08/23/22  1102   POCGLU 107*       Coags:   Lab Results   Component Value Date/Time    PROTIME 10.1 08/23/2022 08:52 AM    INR 0.9 08/23/2022 08:52 AM    APTT 19.5 08/23/2022 08:52 AM       HCG (If Applicable): No results found for: PREGTESTUR, PREGSERUM, HCG, HCGQUANT     ABGs:   Lab Results   Component Value Date/Time    PHART 7.398 02/06/2022 10:30 AM    PO2ART 75.9 02/06/2022 10:30 AM    WLP4YBT 54.3 02/06/2022 10:30 AM    LCY9DVR 32.7 02/06/2022 10:30 AM    Q8NVEQLG 95.0 02/06/2022 10:30 AM        Type & Screen (If Applicable):  No results found for: LABABO, LABRH    Drug/Infectious Status (If Applicable):  No results found for: HIV, HEPCAB    COVID-19 Screening (If Applicable):   Lab Results   Component Value Date/Time    COVID19 Not Detected 04/28/2022 12:10 PM    COVID19 Not Detected 02/05/2022 03:30 PM           Anesthesia Evaluation  Patient summary reviewed no history of anesthetic complications:   Airway: Mallampati: II  TM distance: >3 FB   Neck ROM: full  Mouth opening: > = 3 FB   Dental:    (+) upper dentures and lower dentures      Pulmonary:   (+) COPD: no interval change,  decreased breath sounds                            Cardiovascular:    (+) CAD: no interval change, CHF: no interval change,       ECG reviewed  Rhythm: regular  Rate: normal  Echocardiogram reviewed                  Neuro/Psych:   (+) psychiatric history:depression/anxiety             GI/Hepatic/Renal:   (+) GERD: no interval change,           Endo/Other:    (+) hypothyroidism::., .                 Abdominal:             Vascular: negative vascular ROS. Other Findings:           Anesthesia Plan      general     ASA 3       Induction: intravenous.   arterial line    Anesthetic plan and risks discussed with patient. Use of blood products discussed with patient whom consented to blood products. Plan discussed with CRNA.                     Roz Hester MD   8/23/2022

## 2022-08-24 ENCOUNTER — APPOINTMENT (OUTPATIENT)
Dept: GENERAL RADIOLOGY | Age: 77
DRG: 025 | End: 2022-08-24
Payer: MEDICARE

## 2022-08-24 ENCOUNTER — APPOINTMENT (OUTPATIENT)
Dept: CT IMAGING | Age: 77
DRG: 025 | End: 2022-08-24
Payer: MEDICARE

## 2022-08-24 LAB
ABSOLUTE EOS #: <0.03 K/UL (ref 0–0.44)
ABSOLUTE IMMATURE GRANULOCYTE: 0.05 K/UL (ref 0–0.3)
ABSOLUTE LYMPH #: 0.73 K/UL (ref 1.1–3.7)
ABSOLUTE MONO #: 1.23 K/UL (ref 0.1–1.2)
ANION GAP SERPL CALCULATED.3IONS-SCNC: 9 MMOL/L (ref 9–17)
BASOPHILS # BLD: 0 % (ref 0–2)
BASOPHILS ABSOLUTE: <0.03 K/UL (ref 0–0.2)
BUN BLDV-MCNC: 10 MG/DL (ref 8–23)
CALCIUM IONIZED: 1.14 MMOL/L (ref 1.13–1.33)
CALCIUM SERPL-MCNC: 8.7 MG/DL (ref 8.6–10.4)
CHLORIDE BLD-SCNC: 99 MMOL/L (ref 98–107)
CO2: 24 MMOL/L (ref 20–31)
CREAT SERPL-MCNC: 0.36 MG/DL (ref 0.5–0.9)
CULTURE: NO GROWTH
EOSINOPHILS RELATIVE PERCENT: 0 % (ref 1–4)
FIO2: 30
GFR AFRICAN AMERICAN: >60 ML/MIN
GFR NON-AFRICAN AMERICAN: >60 ML/MIN
GFR SERPL CREATININE-BSD FRML MDRD: ABNORMAL ML/MIN/{1.73_M2}
GLUCOSE BLD-MCNC: 110 MG/DL (ref 65–105)
GLUCOSE BLD-MCNC: 110 MG/DL (ref 65–105)
GLUCOSE BLD-MCNC: 123 MG/DL (ref 65–105)
GLUCOSE BLD-MCNC: 125 MG/DL (ref 74–100)
GLUCOSE BLD-MCNC: 127 MG/DL (ref 70–99)
GLUCOSE BLD-MCNC: 129 MG/DL (ref 65–105)
GLUCOSE BLD-MCNC: 130 MG/DL (ref 65–105)
HCT VFR BLD CALC: 36.7 % (ref 36.3–47.1)
HEMOGLOBIN: 12.6 G/DL (ref 11.9–15.1)
IMMATURE GRANULOCYTES: 0 %
INR BLD: 1
LYMPHOCYTES # BLD: 6 % (ref 24–43)
MAGNESIUM: 2.3 MG/DL (ref 1.6–2.6)
MCH RBC QN AUTO: 32.7 PG (ref 25.2–33.5)
MCHC RBC AUTO-ENTMCNC: 34.3 G/DL (ref 28.4–34.8)
MCV RBC AUTO: 95.3 FL (ref 82.6–102.9)
MODE: ABNORMAL
MONOCYTES # BLD: 11 % (ref 3–12)
NRBC AUTOMATED: 0 PER 100 WBC
O2 DEVICE/FLOW/%: ABNORMAL
PARTIAL THROMBOPLASTIN TIME: 25 SEC (ref 20.5–30.5)
PDW BLD-RTO: 13.8 % (ref 11.8–14.4)
PHOSPHORUS: 2.5 MG/DL (ref 2.6–4.5)
PLATELET # BLD: 231 K/UL (ref 138–453)
PMV BLD AUTO: 9.7 FL (ref 8.1–13.5)
POC HCO3: 25.6 MMOL/L (ref 21–28)
POC LACTIC ACID: 0.71 MMOL/L (ref 0.56–1.39)
POC O2 SATURATION: 98 % (ref 94–98)
POC PCO2: 41.1 MM HG (ref 35–48)
POC PH: 7.4 (ref 7.35–7.45)
POC PO2: 109.9 MM HG (ref 83–108)
POSITIVE BASE EXCESS, ART: 1 (ref 0–3)
POTASSIUM SERPL-SCNC: 3.6 MMOL/L (ref 3.7–5.3)
PROTHROMBIN TIME: 10.7 SEC (ref 9.1–12.3)
RBC # BLD: 3.85 M/UL (ref 3.95–5.11)
SAMPLE SITE: ABNORMAL
SEG NEUTROPHILS: 82 % (ref 36–65)
SEGMENTED NEUTROPHILS ABSOLUTE COUNT: 9.29 K/UL (ref 1.5–8.1)
SODIUM BLD-SCNC: 132 MMOL/L (ref 135–144)
SPECIMEN DESCRIPTION: NORMAL
WBC # BLD: 11.3 K/UL (ref 3.5–11.3)

## 2022-08-24 PROCEDURE — 95714 VEEG EA 12-26 HR UNMNTR: CPT

## 2022-08-24 PROCEDURE — 6370000000 HC RX 637 (ALT 250 FOR IP)

## 2022-08-24 PROCEDURE — 2500000003 HC RX 250 WO HCPCS

## 2022-08-24 PROCEDURE — 6370000000 HC RX 637 (ALT 250 FOR IP): Performed by: PHYSICIAN ASSISTANT

## 2022-08-24 PROCEDURE — 74018 RADEX ABDOMEN 1 VIEW: CPT

## 2022-08-24 PROCEDURE — 82330 ASSAY OF CALCIUM: CPT

## 2022-08-24 PROCEDURE — 2580000003 HC RX 258: Performed by: PHYSICIAN ASSISTANT

## 2022-08-24 PROCEDURE — 80048 BASIC METABOLIC PNL TOTAL CA: CPT

## 2022-08-24 PROCEDURE — 85025 COMPLETE CBC W/AUTO DIFF WBC: CPT

## 2022-08-24 PROCEDURE — 2000000000 HC ICU R&B

## 2022-08-24 PROCEDURE — 82803 BLOOD GASES ANY COMBINATION: CPT

## 2022-08-24 PROCEDURE — 6360000002 HC RX W HCPCS: Performed by: STUDENT IN AN ORGANIZED HEALTH CARE EDUCATION/TRAINING PROGRAM

## 2022-08-24 PROCEDURE — 83735 ASSAY OF MAGNESIUM: CPT

## 2022-08-24 PROCEDURE — 6360000002 HC RX W HCPCS: Performed by: SURGERY

## 2022-08-24 PROCEDURE — 6360000002 HC RX W HCPCS: Performed by: PHYSICIAN ASSISTANT

## 2022-08-24 PROCEDURE — 70450 CT HEAD/BRAIN W/O DYE: CPT

## 2022-08-24 PROCEDURE — 2500000003 HC RX 250 WO HCPCS: Performed by: STUDENT IN AN ORGANIZED HEALTH CARE EDUCATION/TRAINING PROGRAM

## 2022-08-24 PROCEDURE — 2700000000 HC OXYGEN THERAPY PER DAY

## 2022-08-24 PROCEDURE — 71045 X-RAY EXAM CHEST 1 VIEW: CPT

## 2022-08-24 PROCEDURE — 6370000000 HC RX 637 (ALT 250 FOR IP): Performed by: NURSE PRACTITIONER

## 2022-08-24 PROCEDURE — 84100 ASSAY OF PHOSPHORUS: CPT

## 2022-08-24 PROCEDURE — 94003 VENT MGMT INPAT SUBQ DAY: CPT

## 2022-08-24 PROCEDURE — 95720 EEG PHY/QHP EA INCR W/VEEG: CPT | Performed by: PSYCHIATRY & NEUROLOGY

## 2022-08-24 PROCEDURE — 85730 THROMBOPLASTIN TIME PARTIAL: CPT

## 2022-08-24 PROCEDURE — 83605 ASSAY OF LACTIC ACID: CPT

## 2022-08-24 PROCEDURE — 6360000002 HC RX W HCPCS: Performed by: NURSE PRACTITIONER

## 2022-08-24 PROCEDURE — 6370000000 HC RX 637 (ALT 250 FOR IP): Performed by: STUDENT IN AN ORGANIZED HEALTH CARE EDUCATION/TRAINING PROGRAM

## 2022-08-24 PROCEDURE — 37799 UNLISTED PX VASCULAR SURGERY: CPT

## 2022-08-24 PROCEDURE — 2580000003 HC RX 258: Performed by: STUDENT IN AN ORGANIZED HEALTH CARE EDUCATION/TRAINING PROGRAM

## 2022-08-24 PROCEDURE — 94640 AIRWAY INHALATION TREATMENT: CPT

## 2022-08-24 PROCEDURE — 82947 ASSAY GLUCOSE BLOOD QUANT: CPT

## 2022-08-24 PROCEDURE — 94761 N-INVAS EAR/PLS OXIMETRY MLT: CPT

## 2022-08-24 PROCEDURE — 85610 PROTHROMBIN TIME: CPT

## 2022-08-24 PROCEDURE — 6360000002 HC RX W HCPCS

## 2022-08-24 RX ORDER — LEVETIRACETAM 100 MG/ML
500 SOLUTION ORAL 2 TIMES DAILY
Status: DISCONTINUED | OUTPATIENT
Start: 2022-08-24 | End: 2022-08-25

## 2022-08-24 RX ORDER — ALBUTEROL SULFATE 2.5 MG/3ML
2.5 SOLUTION RESPIRATORY (INHALATION) 4 TIMES DAILY
Status: DISCONTINUED | OUTPATIENT
Start: 2022-08-24 | End: 2022-08-25

## 2022-08-24 RX ORDER — IPRATROPIUM BROMIDE AND ALBUTEROL SULFATE 2.5; .5 MG/3ML; MG/3ML
3 SOLUTION RESPIRATORY (INHALATION) EVERY 4 HOURS
Status: DISCONTINUED | OUTPATIENT
Start: 2022-08-24 | End: 2022-08-29

## 2022-08-24 RX ORDER — AMLODIPINE BESYLATE 5 MG/1
5 TABLET ORAL DAILY
Status: DISCONTINUED | OUTPATIENT
Start: 2022-08-24 | End: 2022-08-26

## 2022-08-24 RX ORDER — HYDRALAZINE HYDROCHLORIDE 20 MG/ML
10 INJECTION INTRAMUSCULAR; INTRAVENOUS ONCE
Status: DISCONTINUED | OUTPATIENT
Start: 2022-08-24 | End: 2022-08-25

## 2022-08-24 RX ORDER — METOCLOPRAMIDE HYDROCHLORIDE 5 MG/ML
10 INJECTION INTRAMUSCULAR; INTRAVENOUS EVERY 6 HOURS
Status: COMPLETED | OUTPATIENT
Start: 2022-08-24 | End: 2022-08-27

## 2022-08-24 RX ORDER — LANSOPRAZOLE
30 KIT
Status: DISCONTINUED | OUTPATIENT
Start: 2022-08-25 | End: 2022-08-27

## 2022-08-24 RX ADMIN — Medication 200 MCG/HR: at 05:03

## 2022-08-24 RX ADMIN — HYDRALAZINE HYDROCHLORIDE 10 MG: 20 INJECTION, SOLUTION INTRAMUSCULAR; INTRAVENOUS at 06:16

## 2022-08-24 RX ADMIN — AMLODIPINE BESYLATE 5 MG: 5 TABLET ORAL at 12:38

## 2022-08-24 RX ADMIN — POTASSIUM BICARBONATE 40 MEQ: 782 TABLET, EFFERVESCENT ORAL at 05:52

## 2022-08-24 RX ADMIN — Medication 10 MG: at 16:46

## 2022-08-24 RX ADMIN — IPRATROPIUM BROMIDE AND ALBUTEROL SULFATE 3 ML: .5; 3 SOLUTION RESPIRATORY (INHALATION) at 10:59

## 2022-08-24 RX ADMIN — SODIUM CHLORIDE, PRESERVATIVE FREE 10 ML: 5 INJECTION INTRAVENOUS at 20:27

## 2022-08-24 RX ADMIN — DIBASIC SODIUM PHOSPHATE, MONOBASIC POTASSIUM PHOSPHATE AND MONOBASIC SODIUM PHOSPHATE 2 TABLET: 852; 155; 130 TABLET ORAL at 12:39

## 2022-08-24 RX ADMIN — METOCLOPRAMIDE 10 MG: 5 INJECTION, SOLUTION INTRAMUSCULAR; INTRAVENOUS at 17:00

## 2022-08-24 RX ADMIN — PREGABALIN 300 MG: 100 CAPSULE ORAL at 20:26

## 2022-08-24 RX ADMIN — Medication 2000 MG: at 08:56

## 2022-08-24 RX ADMIN — POLYETHYLENE GLYCOL 3350 17 G: 17 POWDER, FOR SOLUTION ORAL at 07:57

## 2022-08-24 RX ADMIN — LEVETIRACETAM 500 MG: 100 SOLUTION ORAL at 20:26

## 2022-08-24 RX ADMIN — POTASSIUM BICARBONATE 40 MEQ: 782 TABLET, EFFERVESCENT ORAL at 00:34

## 2022-08-24 RX ADMIN — IPRATROPIUM BROMIDE AND ALBUTEROL SULFATE 3 ML: .5; 3 SOLUTION RESPIRATORY (INHALATION) at 15:31

## 2022-08-24 RX ADMIN — PROPOFOL 25 MCG/KG/MIN: 10 INJECTION, EMULSION INTRAVENOUS at 23:31

## 2022-08-24 RX ADMIN — ACETAMINOPHEN 650 MG: 325 TABLET ORAL at 23:33

## 2022-08-24 RX ADMIN — STANDARDIZED SENNA CONCENTRATE AND DOCUSATE SODIUM 1 TABLET: 8.6; 5 TABLET ORAL at 20:27

## 2022-08-24 RX ADMIN — ACETAMINOPHEN 650 MG: 325 TABLET ORAL at 00:37

## 2022-08-24 RX ADMIN — SODIUM CHLORIDE, PRESERVATIVE FREE 10 ML: 5 INJECTION INTRAVENOUS at 07:58

## 2022-08-24 RX ADMIN — LEVOTHYROXINE SODIUM 200 MCG: 125 TABLET ORAL at 05:24

## 2022-08-24 RX ADMIN — ACETAMINOPHEN 650 MG: 325 TABLET ORAL at 05:01

## 2022-08-24 RX ADMIN — LEVETIRACETAM 500 MG: 5 INJECTION INTRAVENOUS at 08:00

## 2022-08-24 RX ADMIN — PROPOFOL 50 MCG/KG/MIN: 10 INJECTION, EMULSION INTRAVENOUS at 00:43

## 2022-08-24 RX ADMIN — SODIUM CHLORIDE 104 ML/HR: 9 INJECTION, SOLUTION INTRAVENOUS at 13:06

## 2022-08-24 RX ADMIN — METOCLOPRAMIDE 10 MG: 5 INJECTION, SOLUTION INTRAMUSCULAR; INTRAVENOUS at 22:21

## 2022-08-24 RX ADMIN — STANDARDIZED SENNA CONCENTRATE AND DOCUSATE SODIUM 1 TABLET: 8.6; 5 TABLET ORAL at 07:58

## 2022-08-24 RX ADMIN — IPRATROPIUM BROMIDE AND ALBUTEROL SULFATE 3 ML: .5; 3 SOLUTION RESPIRATORY (INHALATION) at 23:36

## 2022-08-24 RX ADMIN — ALBUTEROL SULFATE 2.5 MG: 2.5 SOLUTION RESPIRATORY (INHALATION) at 07:45

## 2022-08-24 RX ADMIN — PREGABALIN 300 MG: 100 CAPSULE ORAL at 00:37

## 2022-08-24 RX ADMIN — IPRATROPIUM BROMIDE AND ALBUTEROL SULFATE 3 ML: .5; 3 SOLUTION RESPIRATORY (INHALATION) at 19:35

## 2022-08-24 RX ADMIN — PANTOPRAZOLE SODIUM 40 MG: 40 TABLET, DELAYED RELEASE ORAL at 05:25

## 2022-08-24 RX ADMIN — SERTRALINE 50 MG: 50 TABLET, FILM COATED ORAL at 12:39

## 2022-08-24 RX ADMIN — PREGABALIN 300 MG: 100 CAPSULE ORAL at 08:39

## 2022-08-24 RX ADMIN — STANDARDIZED SENNA CONCENTRATE AND DOCUSATE SODIUM 1 TABLET: 8.6; 5 TABLET ORAL at 00:38

## 2022-08-24 RX ADMIN — PROPOFOL 25 MCG/KG/MIN: 10 INJECTION, EMULSION INTRAVENOUS at 05:01

## 2022-08-24 RX ADMIN — METOCLOPRAMIDE 10 MG: 5 INJECTION, SOLUTION INTRAMUSCULAR; INTRAVENOUS at 12:38

## 2022-08-24 RX ADMIN — ACETAMINOPHEN 650 MG: 325 TABLET ORAL at 17:46

## 2022-08-24 RX ADMIN — ACETAMINOPHEN 650 MG: 325 TABLET ORAL at 12:39

## 2022-08-24 RX ADMIN — Medication 2000 MG: at 01:33

## 2022-08-24 ASSESSMENT — PULMONARY FUNCTION TESTS
PIF_VALUE: 26
PIF_VALUE: 5
PIF_VALUE: 20
PIF_VALUE: 13
PIF_VALUE: 24
PIF_VALUE: 18
PIF_VALUE: 9
PIF_VALUE: 25
PIF_VALUE: 18
PIF_VALUE: 27
PIF_VALUE: 21
PIF_VALUE: 14
PIF_VALUE: 13
PIF_VALUE: 13
PIF_VALUE: 14
PIF_VALUE: 15
PIF_VALUE: 15
PIF_VALUE: 22
PIF_VALUE: 13
PIF_VALUE: 24
PIF_VALUE: 14
PIF_VALUE: 20
PIF_VALUE: 17
PIF_VALUE: 24
PIF_VALUE: 15
PIF_VALUE: 16
PIF_VALUE: 23
PIF_VALUE: 16
PIF_VALUE: 19
PIF_VALUE: 20
PIF_VALUE: 13
PIF_VALUE: 24
PIF_VALUE: 24
PIF_VALUE: 19
PIF_VALUE: 19
PIF_VALUE: 15
PIF_VALUE: 14
PIF_VALUE: 24
PIF_VALUE: 22
PIF_VALUE: 16
PIF_VALUE: 14
PIF_VALUE: 22
PIF_VALUE: 18
PIF_VALUE: 21
PIF_VALUE: 18
PIF_VALUE: 11
PIF_VALUE: 23
PIF_VALUE: 22

## 2022-08-24 ASSESSMENT — PAIN SCALES - GENERAL
PAINLEVEL_OUTOF10: 0

## 2022-08-24 ASSESSMENT — PAIN SCALES - WONG BAKER: WONGBAKER_NUMERICALRESPONSE: 0

## 2022-08-24 NOTE — PROGRESS NOTES
ICU PROGRESS NOTE        PATIENT NAME: Janet [facility] RECORD NO. [id number]  DATE: 2022    PRIMARY CARE PHYSICIAN: Jorge Go MD    HD: # 2    ASSESSMENT    Patient Active Problem List   Diagnosis    Hypothyroidism    Major depressive disorder    Chronic midline low back pain without sciatica    Iron deficiency anemia    COPD (chronic obstructive pulmonary disease) (HCC)    Biventricular congestive heart failure (HCC)    Anemia    New onset a-fib (HCC)    Pulmonary fibrosis (HCC)    Subdural hematoma ([address]    Fall as cause of accidental injury in home as place of occurrence, initial encounter    Midline shift of brain due to hematoma [facility])       MEDICAL DECISION MAKING AND PLAN  SDH   NS following   Plan for OR this afternoon due to size of bleed and c/o headache. Chief Complaint: \"headache\"    SUBJECTIVE    Abingdon Mayco  seen at bedside. C/o headache.       OBJECTIVE  VITALS: Temp: Temp: 98.6 °F (37 °C)Temp  Av.3 °F (36.8 °C)  Min: 97.7 °F (36.5 °C)  Max: 98.8 °F (54.7 °C) BP Systolic (64PMH), EYZ:823 , Min:113 , EZN:596   Diastolic (95XFD), EVJ:20, Min:55, Max:112   Pulse Pulse  Av.3  Min: 53  Max: 116 Resp Resp  Av.8  Min: 11  Max: 25 Pulse ox SpO2  Av.8 %  Min: 90 %  Max: 100 %    CONSTITUTIONAL: alert, cooperative  HEENT: pupils equal round reactive  LUNGS: clear unlabored  CV: regular rate and rhythm  GI: abd soft  MUSCULOSKELETAL: moves all extremities  NEUROLOGIC: oriented  SKIN: warm dry      LAB:  CBC:   Recent Labs     22  0716 22  0506   WBC 5.4 6.7 11.3   HGB 13.1 11.9 12.6   HCT 40.2 37.0 36.7   MCV 96.6 97.1 95.3    231 231     BMP:   Recent Labs     22  0716 22  0506    134* 132*   K 3.5* 3.4* 3.6*   CL 99 100 99   CO2  24   BUN 10 9 10   CREATININE 0.41* 0.31* 0.36*   GLUCOSE 99 122* 127*             Laurie Boyd, RYAN - CNP  22, 10:22 AM

## 2022-08-24 NOTE — PROCEDURES
Referring physician: Eddy Vergara DO  Date: 8/24/2022  Start Time: 8/23/2022 @ 2100  End Time: 8/24/2022 @ 2100    Indication  Patient with encephalopathy, EEG done to rule out subclinical seizures. Introduction  This continuous video-EEG was acquired using a [employer] workstation at 256 samples/s. Electrodes were placed according to the International 10-20 system. Automated spike and seizure detection algorithms were applied. Video was recorded during this study. Description  During the maximal alert state, a poorly-regulated 5-6 Hz posterior dominant rhythm was seen which was symmetrical and attenuated to eye opening. There was continuous left hemispheric focal slowing or interhemispheric asymmetry was noted. Normal sleep structures were not observed. There were frequent left parieto-temporal sharps. Events  No events reported. Impression  Abnormal continuous vEEG recording, the slowing mentioned above suggests moderate non specific encephalopathy. The presence of left parieto-temporal sharps increases patient risk for focal seizures. The interhemispheric asymmetry suggest breach rhythm and underlying lesion/dysfunction on the left hemisphere. EKG lead did not show clear arrhythmia, if still in concern consider formal EKG or correlation with telemetry. Viral Haywood MD  Epilepsy Board Certified. Neurology Board Certified.     Electronically Signed

## 2022-08-24 NOTE — PLAN OF CARE
Problem: Discharge Planning  Goal: Discharge to home or other facility with appropriate resources  8/24/2022 0150 by Pedro Mark RN  Outcome: Progressing  Flowsheets (Taken 8/23/2022 2015)  Discharge to home or other facility with appropriate resources: Identify barriers to discharge with patient and caregiver  8/23/2022 1732 by Niels Segundo RN  Outcome: Progressing  8/23/2022 1331 by Neris Oliver RN  Outcome: Progressing     Problem: Pain  Goal: Verbalizes/displays adequate comfort level or baseline comfort level  8/24/2022 0150 by Pedro Mark RN  Outcome: Progressing  8/23/2022 1732 by Niels Segundo RN  Outcome: Progressing  8/23/2022 1331 by Neris Oliver RN  Outcome: Progressing  Flowsheets (Taken 8/23/2022 0400 by Roxane Aguilar RN)  Verbalizes/displays adequate comfort level or baseline comfort level: Assess pain using appropriate pain scale     Problem: Skin/Tissue Integrity  Goal: Absence of new skin breakdown  Description: 1. Monitor for areas of redness and/or skin breakdown  2. Assess vascular access sites hourly  3. Every 4-6 hours minimum:  Change oxygen saturation probe site  4. Every 4-6 hours:  If on nasal continuous positive airway pressure, respiratory therapy assess nares and determine need for appliance change or resting period.   8/24/2022 0150 by Pedro Mark RN  Outcome: Progressing  8/23/2022 1732 by Niels Segundo RN  Outcome: Progressing  8/23/2022 1331 by Neris Oliver RN  Outcome: Progressing     Problem: Safety - Adult  Goal: Free from fall injury  8/24/2022 0150 by Pedro Mark RN  Outcome: Progressing  8/23/2022 1732 by Niels Segundo RN  Outcome: Progressing  8/23/2022 1331 by Neris Oliver RN  Outcome: Progressing  Flowsheets (Taken 8/23/2022 0945)  Free From Fall Injury: Instruct family/caregiver on patient safety     Problem: ABCDS Injury Assessment  Goal: Absence of physical injury  8/24/2022 0150 by Pedro Mark RN  Outcome: Progressing  8/23/2022 1732 by Quita Shirley RN  Outcome: Progressing  8/23/2022 1331 by Vikash Christianson RN  Outcome: Progressing  Flowsheets (Taken 8/23/2022 0945)  Absence of Physical Injury: Implement safety measures based on patient assessment     Problem: Chronic Conditions and Co-morbidities  Goal: Patient's chronic conditions and co-morbidity symptoms are monitored and maintained or improved  8/24/2022 0150 by Praveen Noel RN  Outcome: Progressing  Flowsheets (Taken 8/23/2022 2015)  Care Plan - Patient's Chronic Conditions and Co-Morbidity Symptoms are Monitored and Maintained or Improved: Monitor and assess patient's chronic conditions and comorbid symptoms for stability, deterioration, or improvement  8/23/2022 1732 by Quita Shirley RN  Outcome: Progressing  8/23/2022 1331 by Vikash Christianson RN  Outcome: Progressing     Problem: Safety - Medical Restraint  Goal: Remains free of injury from restraints (Restraint for Interference with Medical Device)  Description: INTERVENTIONS:  1. Determine that other, less restrictive measures have been tried or would not be effective before applying the restraint  2. Evaluate the patient's condition at the time of restraint application  3. Inform patient/family regarding the reason for restraint  4.  Q2H: Monitor safety, psychosocial status, comfort, nutrition and hydration  Outcome: Progressing  Flowsheets  Taken 8/24/2022 0000  Remains free of injury from restraints (restraint for interference with medical device): Every 2 hours: Monitor safety, psychosocial status, comfort, nutrition and hydration  Taken 8/23/2022 2200  Remains free of injury from restraints (restraint for interference with medical device):   Determine that other, less restrictive measures have been tried or would not be effective before applying the restraint   Evaluate the patient's condition at the time of restraint application   Every 2 hours: Monitor safety, psychosocial status, comfort, nutrition and hydration  Taken 8/23/2022 2014  Remains free of injury from restraints (restraint for interference with medical device):   Determine that other, less restrictive measures have been tried or would not be effective before applying the restraint   Inform patient/family regarding the reason for restraint   Every 2 hours: Monitor safety, psychosocial status, comfort, nutrition and hydration

## 2022-08-24 NOTE — PROGRESS NOTES
sedated  HEENT: drsg to scalp intact. JEOVANNY intact. LUNGS: clear. Diminished to bases  CV: regular rate and rhythm  GI: abd soft  MUSCULOSKELETAL: no deformity noted  NEUROLOGIC: follows commands  SKIN: bruising to face, warm, dry        Drain/tube output:    JEOVANNY 220 out since OR    LAB:  CBC:   Recent Labs     08/23/22  0716 08/23/22 2045 08/24/22  0506   WBC 5.4 6.7 11.3   HGB 13.1 11.9 12.6   HCT 40.2 37.0 36.7   MCV 96.6 97.1 95.3    231 231     BMP:   Recent Labs     08/23/22  0716 08/23/22 2045 08/24/22  0506    134* 132*   K 3.5* 3.4* 3.6*   CL 99 100 99   CO2 25 24 24   BUN 10 9 10   CREATININE 0.41* 0.31* 0.36*   GLUCOSE 99 122* 127*         RADIOLOGY:  CT head 8/24/22  Impression   1. Status post left hemispheric craniotomy with presumed evacuation of   subdural hematoma. Expected postsurgical changes of subdural and   subcutaneous tissue gas at the surgical site. Subdural blood has decreased   and there is lesser degree of left right mediastinal shift. CXR 8/24    Impression   1. Endotracheal tube tip is 1.7 cm above the alejandro. Recommend retraction   by an additional 2 cm for optimal positioning. 2.  Patchy airspace opacities predominantly in the upper lobes. Consider   pneumonia in the appropriate clinical setting. RYAN Zaldivar CNP  8/24/22, 10:23 AM  I personally evaluated this critical patient and directed the medical decision making with Resident/LAUREN after the physical/radiologic exam and laboratory values were reviewed and confirmed. Patient critical  Total cc time:   35 min     I am managing    CV: HDS, assessed with US / PPV  Perfusion sat :   optimization of preload/afterload and contractility to improve delivery of oxygen      Pulm: Respiratory Failure s/p trauma. Optimizing PF ratio with frequent ABG's and Vent changes.     Renal:  Cre unchanged Maintain adequate uop, optimizing renal function,   GI: bowel care intiated  I  Neuro: Multimodal Pain Agitation Delirium Sedation (PADS Therapy)  Mild  TBI  Optimizing brain oxygenation maintaining sufficient oxygen delivery, HOB >30, BG <180, Normothermia    Diet: Malnutrtion TF going to goal

## 2022-08-24 NOTE — PROGRESS NOTES
Patient brought down from Neuro ICU on monitor with RN. All belongings brought with patient including upper/lower dentures, smart watch, phone, and glasses    . Electronically signed by Aura De La O RN on 8/23/2022 at 8:13 PM

## 2022-08-24 NOTE — PLAN OF CARE
Problem: Discharge Planning  Goal: Discharge to home or other facility with appropriate resources  8/24/2022 1225 by Drea Pardo RN  Outcome: Progressing  Flowsheets (Taken 8/24/2022 0400 by Crystal Nicholson RN)  Discharge to home or other facility with appropriate resources: Identify barriers to discharge with patient and caregiver  8/24/2022 0150 by Crystal Nicholson RN  Outcome: Progressing  Flowsheets (Taken 8/23/2022 2015)  Discharge to home or other facility with appropriate resources: Identify barriers to discharge with patient and caregiver     Problem: Pain  Goal: Verbalizes/displays adequate comfort level or baseline comfort level  8/24/2022 1225 by Drea Pardo RN  Outcome: Progressing  8/24/2022 0150 by Crystal Nicholson RN  Outcome: Progressing     Problem: Skin/Tissue Integrity  Goal: Absence of new skin breakdown  Description: 1. Monitor for areas of redness and/or skin breakdown  2. Assess vascular access sites hourly  3. Every 4-6 hours minimum:  Change oxygen saturation probe site  4. Every 4-6 hours:  If on nasal continuous positive airway pressure, respiratory therapy assess nares and determine need for appliance change or resting period.   8/24/2022 1225 by Drea Pardo RN  Outcome: Progressing  8/24/2022 0150 by Crystal Nicholson RN  Outcome: Progressing     Problem: Safety - Adult  Goal: Free from fall injury  8/24/2022 1225 by Drea Pardo RN  Outcome: Progressing  Flowsheets (Taken 8/24/2022 1200)  Free From Fall Injury: Instruct family/caregiver on patient safety  8/24/2022 0150 by Crystal Nicholson RN  Outcome: Progressing     Problem: ABCDS Injury Assessment  Goal: Absence of physical injury  8/24/2022 1225 by Drea Pardo RN  Outcome: Progressing  Flowsheets (Taken 8/24/2022 1200)  Absence of Physical Injury: Implement safety measures based on patient assessment  8/24/2022 0150 by Cyrstal Nicholson RN  Outcome: Progressing     Problem: Chronic Conditions and Co-morbidities  Goal: Patient's chronic conditions and co-morbidity symptoms are monitored and maintained or improved  8/24/2022 1225 by Karina Caceres RN  Outcome: Progressing  Flowsheets (Taken 8/24/2022 0400 by Sydney Sahu RN)  Care Plan - Patient's Chronic Conditions and Co-Morbidity Symptoms are Monitored and Maintained or Improved: Monitor and assess patient's chronic conditions and comorbid symptoms for stability, deterioration, or improvement  8/24/2022 0150 by Sydney Sahu RN  Outcome: Progressing  Flowsheets (Taken 8/23/2022 2015)  Care Plan - Patient's Chronic Conditions and Co-Morbidity Symptoms are Monitored and Maintained or Improved: Monitor and assess patient's chronic conditions and comorbid symptoms for stability, deterioration, or improvement     Problem: Safety - Medical Restraint  Goal: Remains free of injury from restraints (Restraint for Interference with Medical Device)  Description: INTERVENTIONS:  1. Determine that other, less restrictive measures have been tried or would not be effective before applying the restraint  2. Evaluate the patient's condition at the time of restraint application  3. Inform patient/family regarding the reason for restraint  4.  Q2H: Monitor safety, psychosocial status, comfort, nutrition and hydration  8/24/2022 1225 by Karina Caceres RN  Outcome: Progressing  Flowsheets  Taken 8/24/2022 1200 by Karina Caceres RN  Remains free of injury from restraints (restraint for interference with medical device): Every 2 hours: Monitor safety, psychosocial status, comfort, nutrition and hydration  Taken 8/24/2022 1000 by Karina Caceres RN  Remains free of injury from restraints (restraint for interference with medical device): Every 2 hours: Monitor safety, psychosocial status, comfort, nutrition and hydration  Taken 8/24/2022 0800 by Karina Caceres RN  Remains free of injury from restraints (restraint for interference with medical device): Every 2 hours:  Monitor safety, psychosocial status, comfort, nutrition and hydration  Taken 8/24/2022 0600 by Dennise Ramirez RN  Remains free of injury from restraints (restraint for interference with medical device): Every 2 hours: Monitor safety, psychosocial status, comfort, nutrition and hydration  Taken 8/24/2022 0400 by Dennise Ramirze RN  Remains free of injury from restraints (restraint for interference with medical device): Every 2 hours: Monitor safety, psychosocial status, comfort, nutrition and hydration  Taken 8/24/2022 0200 by Dennise Ramirez RN  Remains free of injury from restraints (restraint for interference with medical device): Every 2 hours: Monitor safety, psychosocial status, comfort, nutrition and hydration  8/24/2022 0150 by Dennise Ramirez RN  Outcome: Progressing  Flowsheets  Taken 8/24/2022 0000 by Dennise Ramirez RN  Remains free of injury from restraints (restraint for interference with medical device): Every 2 hours: Monitor safety, psychosocial status, comfort, nutrition and hydration  Taken 8/23/2022 2200 by Dennise Ramirez RN  Remains free of injury from restraints (restraint for interference with medical device):   Determine that other, less restrictive measures have been tried or would not be effective before applying the restraint   Evaluate the patient's condition at the time of restraint application   Every 2 hours: Monitor safety, psychosocial status, comfort, nutrition and hydration  Taken 8/23/2022 2014 by Dennise Ramirez RN  Remains free of injury from restraints (restraint for interference with medical device):   Determine that other, less restrictive measures have been tried or would not be effective before applying the restraint   Inform patient/family regarding the reason for restraint   Every 2 hours: Monitor safety, psychosocial status, comfort, nutrition and hydration  Taken 8/23/2022 1900 by Dejon Pruett RN  Remains free of injury from restraints (restraint for interference with medical device):   Determine that other, less restrictive measures have been tried or would not be effective before applying the restraint   Evaluate the patient's condition at the time of restraint application   Inform patient/family regarding the reason for restraint   Every 2 hours: Monitor safety, psychosocial status, comfort, nutrition and hydration  Taken 8/23/2022 1703 by Yony Suresh RN  Remains free of injury from restraints (restraint for interference with medical device):   Determine that other, less restrictive measures have been tried or would not be effective before applying the restraint   Every 2 hours: Monitor safety, psychosocial status, comfort, nutrition and hydration   Evaluate the patient's condition at the time of restraint application   Inform patient/family regarding the reason for restraint

## 2022-08-24 NOTE — PROGRESS NOTES
Comprehensive Nutrition Assessment    Type and Reason for Visit:  Initial, Consult (TF recommendations only- provider to manage.)    Nutrition Recommendations/Plan:   Plan to start Immune Enhancing TF at 25 mL/hr today; recommend goal rate of 45 mL/hr to provide 1620 kcal and 101 g pro/day. Will monitor TF tolerance/adequacy. Malnutrition Assessment:  Malnutrition Status:  Insufficient data (08/24/22 1340)    Context:  Acute Illness     Findings of the 6 clinical characteristics of malnutrition:  Energy Intake:  Unable to assess  Weight Loss:  No significant weight loss     Body Fat Loss:  Unable to assess     Muscle Mass Loss:  Unable to assess    Fluid Accumulation:  No significant fluid accumulation (per RN documentation)     Strength:  Not Performed    Nutrition Assessment:    Pt admitted with SDH s/p fall. S/p craniotomy yesterday. Pt is currently intubated. Plan to start TF today; Immune Enhancing formula at 25 mL/hr ordered. RD consulted for TF goal rate (recommendations only). Meds/labs reviewed. Nutrition Related Findings:    meds/labs reviewed Wound Type: Surgical Incision (head)       Current Nutrition Intake & Therapies:    Diet NPO  ADULT TUBE FEEDING; Nasogastric; Immune Enhancing; Continuous; 25; No; 30; Q 4 hours  Current Tube Feeding (TF) Orders:  Feeding Route: Nasogastric  Formula: Immune Enhancing  Schedule: Continuous  Feeding Regimen: 25 mL/hr to start today  Water Flushes: 30 mL every 4 hr  Current TF & Flush Orders Provides: 25 mL/hr =900 kcal and 56 g pro/day  Goal TF & Flush Orders Provides: 45 mL/hr =1620 kcal and 101 g pro/day  Additional Calorie Sources:  Propofol at 4.2 mL/ru=165 kcal/day    Anthropometric Measures:  Height: 5' 5\" (165.1 cm)  Ideal Body Weight (IBW): 125 lbs (57 kg)    Admission Body Weight: 153 lb 14.1 oz (69.8 kg)  Current Body Weight: 153 lb 14.1 oz (69.8 kg), 123.1 % IBW.  Weight Source: Bed Scale  Current BMI (kg/m2): 25.6  Usual Body Weight: 160 lb (72.6 kg) (5/2/22)  % Weight Change (Calculated): -3.8                    BMI Categories: Overweight (BMI 25.0-29. 9)    Estimated Daily Nutrient Needs:  Energy Requirements Based On: Kcal/kg  Weight Used for Energy Requirements: Admission  Energy (kcal/day): 1700 kcal/day  Weight Used for Protein Requirements: Current  Protein (g/day): 85 g pro/day  Method Used for Fluid Requirements: ml/Kg (28)  Fluid (ml/day): 2000 mL/day or per MD    Nutrition Diagnosis:   Inadequate oral intake related to impaired respiratory function as evidenced by NPO or clear liquid status due to medical condition, need for enteral nutrition support    Nutrition Interventions:   Food and/or Nutrient Delivery: Plan to start Immune Enhancing TF at 25 mL/hr today; recommend goal rate of 45 mL/hr to provide 1620 kcal and 101 g pro/day. Nutrition Education/Counseling: No recommendation at this time  Coordination of Nutrition Care: Continue to monitor while inpatient       Goals:     Goals: Meet at least 75% of estimated needs, within 7 days       Nutrition Monitoring and Evaluation:   Behavioral-Environmental Outcomes: None Identified  Food/Nutrient Intake Outcomes: Enteral Nutrition Intake/Tolerance  Physical Signs/Symptoms Outcomes: Biochemical Data, Nutrition Focused Physical Findings, Skin, Weight, Fluid Status or Edema    Discharge Planning:     Too soon to determine     [address], [place]  Contact: [phone]

## 2022-08-24 NOTE — ACP (ADVANCE CARE PLANNING)
Advance Care Planning     Advance Care Planning Activator (Inpatient)  Conversation Note      Date of ACP Conversation: 8/24/2022     ACP Activator: Juliette Treadwell RN    Health Care Decision Maker:     Current Designated Health Care Decision Maker:     Primary Decision Maker: Jese Ruff Spouse - [phone]  Care Preferences    Ventilation: \"If you were in your present state of health and suddenly became very ill and were unable to breathe on your own, what would your preference be about the use of a ventilator (breathing machine) if it were available to you? \"      Would the patient desire the use of ventilator (breathing machine)?: yes    \"If your health worsens and it becomes clear that your chance of recovery is unlikely, what would your preference be about the use of a ventilator (breathing machine) if it were available to you? \"     Would the patient desire the use of ventilator (breathing machine)?: Yes      Resuscitation  \"CPR works best to restart the heart when there is a sudden event, like a heart attack, in someone who is otherwise healthy. Unfortunately, CPR does not typically restart the heart for people who have serious health conditions or who are very sick. \"    \"In the event your heart stopped as a result of an underlying serious health condition, would you want attempts to be made to restart your heart (answer \"yes\" for attempt to resuscitate) or would you prefer a natural death (answer \"no\" for do not attempt to resuscitate)? \" yes      Conversation Outcomes:  [x] ACP discussion completed

## 2022-08-24 NOTE — CARE COORDINATION
08/24/22 1208   Service Assessment   Patient Orientation Sedated   Cognition Other (see comment)  (patient is sedated on ventilator)   History Provided By Spouse   Primary Caregiver Self   Accompanied By/Relationship spouse and son   Support Systems Spouse/Significant Other;Children   PCP Verified by CM Yes   Last Visit to PCP Within last 3 months   Prior Functional Level Independent in ADLs/IADLs   Current Functional Level Assistance with the following:   Can patient return to prior living arrangement Yes   Ability to make needs known: Unable  (patient currently sedated on vent)   Family able to assist with home care needs: Yes   Financial Resources Medicare   Social/Functional History   Lives With Spouse   Type of [address] Two level; Able to Live on Main level with bedroom/bathroom   Home Access Stairs to enter without rails   Entrance Stairs - Number of Steps 2   Bathroom Equipment Grab bars in shower; Shower chair;Commode   Home Equipment Cane;Walker, rolling; [address] Help From Family   ADL Assistance Independent   Homemaking Assistance Independent   Homemaking Responsibilities Yes   Ambulation Assistance Independent   Transfer Assistance Independent   Active  No   Mode of Transportation Family   Discharge Planning   Type of Residence House   Living Arrangements Spouse/Significant Other   Current Services Prior To Admission Durable Medical Equipment  (walker, cane, w/c, BSC, shower chair)   [address]  (SNF vs ARU)   DME Ordered? No   Potential Assistance Purchasing Medications No   Type of Home Care Services None   Patient expects to be discharged to: Other (comment)  (SNF vs ARU)   One/Two Story Residence Two story, live on first floor   Lift Chair Available No   History of falls? [address] Provided?  No   Mode of Transport at Discharge Other (see comment)  (dependent on disposition at time of discharge. BLS transport vs family)   Condition of Participation: Discharge Planning   The Plan for Transition of Care is related to the following treatment goals: comfort   The Patient and/or Patient Representative was provided with a Choice of Provider? Patient   The Patient and/Or Patient Representative agree with the Discharge Plan? Yes   Freedom of Choice list was provided with basic dialogue that supports the patient's individualized plan of care/goals, treatment preferences, and shares the quality data associated with the providers? Yes     Met with patient's spouse and son at bedside to discuss transitional planning. Patient lives with spouse in 2 story home but able to live on the main floor. Patient has walker, cane, wheelchair, BSC, shower chair, and grab bars in shower. The plan is possible SNF vs ARU. The patient has been to [facility] in the past and family is agreeable to referral there if needed.

## 2022-08-24 NOTE — PROGRESS NOTES
Neurosurgery LAUREN/Resident    Daily Progress Note   Chief Complaint   Patient presents with    Trauma     8/24/2022  8:58 AM    Chart reviewed. No acute events overnight. LTME. Vitals:    08/24/22 0630 08/24/22 0645 08/24/22 0700 08/24/22 0741   BP:       Pulse: 62 61 62 61   Resp: 13 13 13 12   Temp:       TempSrc:       SpO2: 96% 95% 95% 95%   Weight:       Height:             PE:   Intubated, sedation held for exam  E3 V1T M6  Opens eyes to voice   Follows commands throughout BUE and BLE    Drain output 120ml/12hr  Incision dressing c/d/i      Lab Results   Component Value Date    WBC 11.3 08/24/2022    HGB 12.6 08/24/2022    HCT 36.7 08/24/2022     08/24/2022    CHOL 251 (H) 06/20/2022    TRIG 79 06/20/2022    HDL 77 06/20/2022    ALT 11 06/20/2022    AST 20 06/20/2022     (L) 08/24/2022    K 3.6 (L) 08/24/2022    CL 99 08/24/2022    CREATININE 0.36 (L) 08/24/2022    BUN 10 08/24/2022    CO2 24 08/24/2022    TSH 11.11 (H) 06/20/2022    INR 1.0 08/24/2022    LABA1C 5.7 06/20/2022    CRP 34.7 (H) 01/18/2022    SEDRATE 5 07/30/2020       Radiology     CT Head wo Contrast    Result Date: 8/24/2022  EXAMINATION: CT OF THE HEAD WITHOUT CONTRAST  8/24/2022 3:48 am TECHNIQUE: CT of the head was performed without the administration of intravenous contrast. Automated exposure control, iterative reconstruction, and/or weight based adjustment of the mA/kV was utilized to reduce the radiation dose to as low as reasonably achievable. COMPARISON: 08/23/2022 HISTORY: ORDERING SYSTEM PROVIDED HISTORY: post op TECHNOLOGIST PROVIDED HISTORY: post op FINDINGS: BRAIN/VENTRICLES: There has been interval left craniotomy with skull defect extending from frontal through parietal and temporal lobe. There is left subdural blood underlying the craniotomy defect which has decreased in size from prior. .  This extends from frontal to occipital region with small amount tracking along the falx and tentorium.   Gas in the subdural region consistent with postsurgical changes. Slight improvement in left right midline shift about 4 mm compared to 6 mm on prior. ORBITS: The visualized portion of the orbits demonstrate no acute abnormality. SINUSES: The visualized paranasal sinuses and mastoid air cells demonstrate no acute abnormality. SOFT TISSUES/SKULL:  Extensive left craniotomy. Overlying edema in the scalp and skin staples consistent with expected postsurgical changes. Few gas bubbles in the overlying soft tissues. Partially imaged surgical drain terminating in the left temporal soft tissues. 1. Status post left hemispheric craniotomy with presumed evacuation of subdural hematoma. Expected postsurgical changes of subdural and subcutaneous tissue gas at the surgical site. Subdural blood has decreased and there is lesser degree of left right mediastinal shift. A/P  69 y.o. female with left sided SDH  POD 1 s/p left craniotomy for evacuation of SDH       - post op CTH reviewed   - continue HOB >30   - continue SBP goal 100-160   - SCDs for dvt ppx, ok for lovenox tomorrow   - extubated as able  - encourage IS  - continue LTME   - hourly neuro checks       Please contact neurosurgery with any changes in patients neurologic status.        Artemio Lay PA-C  8/24/22  8:58 AM

## 2022-08-25 ENCOUNTER — APPOINTMENT (OUTPATIENT)
Dept: GENERAL RADIOLOGY | Age: 77
DRG: 025 | End: 2022-08-25
Payer: MEDICARE

## 2022-08-25 LAB
ABSOLUTE EOS #: 0.07 K/UL (ref 0–0.44)
ABSOLUTE IMMATURE GRANULOCYTE: 0 K/UL (ref 0–0.3)
ABSOLUTE LYMPH #: 0.59 K/UL (ref 1.1–3.7)
ABSOLUTE MONO #: 0.96 K/UL (ref 0.1–1.2)
ALLEN TEST: ABNORMAL
ANION GAP SERPL CALCULATED.3IONS-SCNC: 5 MMOL/L (ref 9–17)
ANION GAP SERPL CALCULATED.3IONS-SCNC: 7 MMOL/L (ref 9–17)
BASOPHILS # BLD: 0 % (ref 0–2)
BASOPHILS ABSOLUTE: 0 K/UL (ref 0–0.2)
BUN BLDV-MCNC: 10 MG/DL (ref 8–23)
BUN BLDV-MCNC: 8 MG/DL (ref 8–23)
CALCIUM IONIZED: 1.14 MMOL/L (ref 1.13–1.33)
CALCIUM SERPL-MCNC: 8.1 MG/DL (ref 8.6–10.4)
CALCIUM SERPL-MCNC: 8.1 MG/DL (ref 8.6–10.4)
CHLORIDE BLD-SCNC: 106 MMOL/L (ref 98–107)
CHLORIDE BLD-SCNC: 106 MMOL/L (ref 98–107)
CO2: 24 MMOL/L (ref 20–31)
CO2: 28 MMOL/L (ref 20–31)
CREAT SERPL-MCNC: 0.31 MG/DL (ref 0.5–0.9)
CREAT SERPL-MCNC: 0.35 MG/DL (ref 0.5–0.9)
EOSINOPHILS RELATIVE PERCENT: 1 % (ref 1–4)
FIO2: 30
GFR AFRICAN AMERICAN: >60 ML/MIN
GFR AFRICAN AMERICAN: >60 ML/MIN
GFR NON-AFRICAN AMERICAN: >60 ML/MIN
GFR NON-AFRICAN AMERICAN: >60 ML/MIN
GFR SERPL CREATININE-BSD FRML MDRD: ABNORMAL ML/MIN/{1.73_M2}
GFR SERPL CREATININE-BSD FRML MDRD: ABNORMAL ML/MIN/{1.73_M2}
GLUCOSE BLD-MCNC: 108 MG/DL (ref 70–99)
GLUCOSE BLD-MCNC: 115 MG/DL (ref 65–105)
GLUCOSE BLD-MCNC: 115 MG/DL (ref 70–99)
GLUCOSE BLD-MCNC: 115 MG/DL (ref 74–100)
GLUCOSE BLD-MCNC: 120 MG/DL (ref 65–105)
GLUCOSE BLD-MCNC: 88 MG/DL (ref 65–105)
HCT VFR BLD CALC: 31.2 % (ref 36.3–47.1)
HEMOGLOBIN: 10.5 G/DL (ref 11.9–15.1)
IMMATURE GRANULOCYTES: 0 %
LYMPHOCYTES # BLD: 8 % (ref 24–43)
MAGNESIUM: 1.8 MG/DL (ref 1.6–2.6)
MAGNESIUM: 2 MG/DL (ref 1.6–2.6)
MCH RBC QN AUTO: 32.5 PG (ref 25.2–33.5)
MCHC RBC AUTO-ENTMCNC: 33.7 G/DL (ref 28.4–34.8)
MCV RBC AUTO: 96.6 FL (ref 82.6–102.9)
MONOCYTES # BLD: 13 % (ref 3–12)
MORPHOLOGY: NORMAL
NEGATIVE BASE EXCESS, ART: 1 (ref 0–2)
NRBC AUTOMATED: 0 PER 100 WBC
O2 DEVICE/FLOW/%: ABNORMAL
PDW BLD-RTO: 14.4 % (ref 11.8–14.4)
PHOSPHORUS: 1.5 MG/DL (ref 2.6–4.5)
PHOSPHORUS: 3.3 MG/DL (ref 2.6–4.5)
PLATELET # BLD: 145 K/UL (ref 138–453)
PMV BLD AUTO: 10.3 FL (ref 8.1–13.5)
POC HCO3: 23 MMOL/L (ref 21–28)
POC LACTIC ACID: 0.62 MMOL/L (ref 0.56–1.39)
POC O2 SATURATION: 96 % (ref 94–98)
POC PCO2: 35.2 MM HG (ref 35–48)
POC PH: 7.42 (ref 7.35–7.45)
POC PO2: 75.9 MM HG (ref 83–108)
POTASSIUM SERPL-SCNC: 3.5 MMOL/L (ref 3.7–5.3)
POTASSIUM SERPL-SCNC: 3.9 MMOL/L (ref 3.7–5.3)
RBC # BLD: 3.23 M/UL (ref 3.95–5.11)
SAMPLE SITE: ABNORMAL
SEG NEUTROPHILS: 78 % (ref 36–65)
SEGMENTED NEUTROPHILS ABSOLUTE COUNT: 5.78 K/UL (ref 1.5–8.1)
SODIUM BLD-SCNC: 137 MMOL/L (ref 135–144)
SODIUM BLD-SCNC: 139 MMOL/L (ref 135–144)
TRIGL SERPL-MCNC: 84 MG/DL
WBC # BLD: 7.4 K/UL (ref 3.5–11.3)

## 2022-08-25 PROCEDURE — 2000000000 HC ICU R&B

## 2022-08-25 PROCEDURE — 95720 EEG PHY/QHP EA INCR W/VEEG: CPT | Performed by: PSYCHIATRY & NEUROLOGY

## 2022-08-25 PROCEDURE — 82803 BLOOD GASES ANY COMBINATION: CPT

## 2022-08-25 PROCEDURE — 6370000000 HC RX 637 (ALT 250 FOR IP): Performed by: PHYSICIAN ASSISTANT

## 2022-08-25 PROCEDURE — 85025 COMPLETE CBC W/AUTO DIFF WBC: CPT

## 2022-08-25 PROCEDURE — 6370000000 HC RX 637 (ALT 250 FOR IP): Performed by: REGISTERED NURSE

## 2022-08-25 PROCEDURE — 83605 ASSAY OF LACTIC ACID: CPT

## 2022-08-25 PROCEDURE — 94761 N-INVAS EAR/PLS OXIMETRY MLT: CPT

## 2022-08-25 PROCEDURE — 6370000000 HC RX 637 (ALT 250 FOR IP): Performed by: NURSE PRACTITIONER

## 2022-08-25 PROCEDURE — 94640 AIRWAY INHALATION TREATMENT: CPT

## 2022-08-25 PROCEDURE — 99024 POSTOP FOLLOW-UP VISIT: CPT | Performed by: NEUROLOGICAL SURGERY

## 2022-08-25 PROCEDURE — 80048 BASIC METABOLIC PNL TOTAL CA: CPT

## 2022-08-25 PROCEDURE — 6370000000 HC RX 637 (ALT 250 FOR IP): Performed by: STUDENT IN AN ORGANIZED HEALTH CARE EDUCATION/TRAINING PROGRAM

## 2022-08-25 PROCEDURE — 95714 VEEG EA 12-26 HR UNMNTR: CPT

## 2022-08-25 PROCEDURE — 37799 UNLISTED PX VASCULAR SURGERY: CPT

## 2022-08-25 PROCEDURE — 2580000003 HC RX 258: Performed by: PHYSICIAN ASSISTANT

## 2022-08-25 PROCEDURE — 84478 ASSAY OF TRIGLYCERIDES: CPT

## 2022-08-25 PROCEDURE — 6360000002 HC RX W HCPCS: Performed by: NURSE PRACTITIONER

## 2022-08-25 PROCEDURE — 84100 ASSAY OF PHOSPHORUS: CPT

## 2022-08-25 PROCEDURE — 2580000003 HC RX 258: Performed by: STUDENT IN AN ORGANIZED HEALTH CARE EDUCATION/TRAINING PROGRAM

## 2022-08-25 PROCEDURE — 6360000002 HC RX W HCPCS: Performed by: STUDENT IN AN ORGANIZED HEALTH CARE EDUCATION/TRAINING PROGRAM

## 2022-08-25 PROCEDURE — 83735 ASSAY OF MAGNESIUM: CPT

## 2022-08-25 PROCEDURE — 36415 COLL VENOUS BLD VENIPUNCTURE: CPT

## 2022-08-25 PROCEDURE — 94003 VENT MGMT INPAT SUBQ DAY: CPT

## 2022-08-25 PROCEDURE — 2500000003 HC RX 250 WO HCPCS

## 2022-08-25 PROCEDURE — 82947 ASSAY GLUCOSE BLOOD QUANT: CPT

## 2022-08-25 PROCEDURE — 2500000003 HC RX 250 WO HCPCS: Performed by: STUDENT IN AN ORGANIZED HEALTH CARE EDUCATION/TRAINING PROGRAM

## 2022-08-25 PROCEDURE — 2700000000 HC OXYGEN THERAPY PER DAY

## 2022-08-25 PROCEDURE — 82330 ASSAY OF CALCIUM: CPT

## 2022-08-25 PROCEDURE — 71045 X-RAY EXAM CHEST 1 VIEW: CPT

## 2022-08-25 RX ORDER — OLANZAPINE 5 MG/1
5 TABLET ORAL NIGHTLY
Status: DISCONTINUED | OUTPATIENT
Start: 2022-08-26 | End: 2022-08-30

## 2022-08-25 RX ORDER — OLANZAPINE 5 MG/1
5 TABLET ORAL NIGHTLY
Status: DISCONTINUED | OUTPATIENT
Start: 2022-08-25 | End: 2022-08-25

## 2022-08-25 RX ORDER — HYDRALAZINE HYDROCHLORIDE 20 MG/ML
10 INJECTION INTRAMUSCULAR; INTRAVENOUS ONCE
Status: COMPLETED | OUTPATIENT
Start: 2022-08-25 | End: 2022-08-25

## 2022-08-25 RX ORDER — OLANZAPINE 5 MG/1
5 TABLET ORAL ONCE
Status: COMPLETED | OUTPATIENT
Start: 2022-08-25 | End: 2022-08-25

## 2022-08-25 RX ORDER — LANOLIN ALCOHOL/MO/W.PET/CERES
400 CREAM (GRAM) TOPICAL
Status: COMPLETED | OUTPATIENT
Start: 2022-08-25 | End: 2022-08-25

## 2022-08-25 RX ORDER — POTASSIUM CHLORIDE 20MEQ/15ML
40 LIQUID (ML) ORAL ONCE
Status: DISCONTINUED | OUTPATIENT
Start: 2022-08-25 | End: 2022-08-25

## 2022-08-25 RX ORDER — LEVETIRACETAM 100 MG/ML
1000 SOLUTION ORAL 2 TIMES DAILY
Status: DISCONTINUED | OUTPATIENT
Start: 2022-08-25 | End: 2022-08-30 | Stop reason: HOSPADM

## 2022-08-25 RX ORDER — DEXMEDETOMIDINE HYDROCHLORIDE 4 UG/ML
.1-1.5 INJECTION, SOLUTION INTRAVENOUS CONTINUOUS
Status: DISCONTINUED | OUTPATIENT
Start: 2022-08-25 | End: 2022-08-25

## 2022-08-25 RX ORDER — MAGNESIUM OXIDE 400 MG/1
400 TABLET ORAL
Status: DISCONTINUED | OUTPATIENT
Start: 2022-08-25 | End: 2022-08-25

## 2022-08-25 RX ORDER — ENOXAPARIN SODIUM 100 MG/ML
30 INJECTION SUBCUTANEOUS 2 TIMES DAILY
Status: DISCONTINUED | OUTPATIENT
Start: 2022-08-25 | End: 2022-08-30 | Stop reason: HOSPADM

## 2022-08-25 RX ADMIN — DEXMEDETOMIDINE HYDROCHLORIDE 0.2 MCG/KG/HR: 4 INJECTION, SOLUTION INTRAVENOUS at 09:02

## 2022-08-25 RX ADMIN — IPRATROPIUM BROMIDE AND ALBUTEROL SULFATE 3 ML: .5; 3 SOLUTION RESPIRATORY (INHALATION) at 07:25

## 2022-08-25 RX ADMIN — HYDRALAZINE HYDROCHLORIDE 10 MG: 20 INJECTION, SOLUTION INTRAMUSCULAR; INTRAVENOUS at 21:01

## 2022-08-25 RX ADMIN — AMLODIPINE BESYLATE 5 MG: 5 TABLET ORAL at 08:36

## 2022-08-25 RX ADMIN — LEVETIRACETAM 1000 MG: 500 SOLUTION ORAL at 20:19

## 2022-08-25 RX ADMIN — LEVOTHYROXINE SODIUM 200 MCG: 125 TABLET ORAL at 07:43

## 2022-08-25 RX ADMIN — ACETAMINOPHEN 650 MG: 325 TABLET ORAL at 16:06

## 2022-08-25 RX ADMIN — STANDARDIZED SENNA CONCENTRATE AND DOCUSATE SODIUM 1 TABLET: 8.6; 5 TABLET ORAL at 08:37

## 2022-08-25 RX ADMIN — Medication 0.2 MG/KG/HR: at 10:55

## 2022-08-25 RX ADMIN — POTASSIUM BICARBONATE 40 MEQ: 782 TABLET, EFFERVESCENT ORAL at 08:29

## 2022-08-25 RX ADMIN — METOCLOPRAMIDE 10 MG: 5 INJECTION, SOLUTION INTRAMUSCULAR; INTRAVENOUS at 16:10

## 2022-08-25 RX ADMIN — METOCLOPRAMIDE 10 MG: 5 INJECTION, SOLUTION INTRAMUSCULAR; INTRAVENOUS at 22:05

## 2022-08-25 RX ADMIN — POLYETHYLENE GLYCOL 3350 17 G: 17 POWDER, FOR SOLUTION ORAL at 08:31

## 2022-08-25 RX ADMIN — ENOXAPARIN SODIUM 30 MG: 100 INJECTION SUBCUTANEOUS at 20:17

## 2022-08-25 RX ADMIN — PREGABALIN 300 MG: 100 CAPSULE ORAL at 08:48

## 2022-08-25 RX ADMIN — Medication 10 MG: at 16:06

## 2022-08-25 RX ADMIN — IPRATROPIUM BROMIDE AND ALBUTEROL SULFATE 3 ML: .5; 3 SOLUTION RESPIRATORY (INHALATION) at 03:21

## 2022-08-25 RX ADMIN — ENOXAPARIN SODIUM 30 MG: 100 INJECTION SUBCUTANEOUS at 09:09

## 2022-08-25 RX ADMIN — STANDARDIZED SENNA CONCENTRATE AND DOCUSATE SODIUM 1 TABLET: 8.6; 5 TABLET ORAL at 20:19

## 2022-08-25 RX ADMIN — SODIUM CHLORIDE 85 ML/HR: 9 INJECTION, SOLUTION INTRAVENOUS at 18:29

## 2022-08-25 RX ADMIN — SODIUM CHLORIDE: 9 INJECTION, SOLUTION INTRAVENOUS at 00:31

## 2022-08-25 RX ADMIN — MAGNESIUM GLUCONATE 500 MG ORAL TABLET 400 MG: 500 TABLET ORAL at 09:08

## 2022-08-25 RX ADMIN — PREGABALIN 300 MG: 100 CAPSULE ORAL at 20:16

## 2022-08-25 RX ADMIN — IPRATROPIUM BROMIDE AND ALBUTEROL SULFATE 3 ML: .5; 3 SOLUTION RESPIRATORY (INHALATION) at 11:40

## 2022-08-25 RX ADMIN — ACETAMINOPHEN 650 MG: 325 TABLET ORAL at 22:25

## 2022-08-25 RX ADMIN — OLANZAPINE 5 MG: 5 TABLET, FILM COATED ORAL at 09:09

## 2022-08-25 RX ADMIN — SODIUM CHLORIDE, PRESERVATIVE FREE 10 ML: 5 INJECTION INTRAVENOUS at 20:52

## 2022-08-25 RX ADMIN — METOCLOPRAMIDE 10 MG: 5 INJECTION, SOLUTION INTRAMUSCULAR; INTRAVENOUS at 11:07

## 2022-08-25 RX ADMIN — SERTRALINE 50 MG: 50 TABLET, FILM COATED ORAL at 08:48

## 2022-08-25 RX ADMIN — Medication 75 MCG/HR: at 03:10

## 2022-08-25 RX ADMIN — LEVETIRACETAM 500 MG: 100 SOLUTION ORAL at 08:30

## 2022-08-25 RX ADMIN — Medication 10 MG: at 22:25

## 2022-08-25 RX ADMIN — IPRATROPIUM BROMIDE AND ALBUTEROL SULFATE 3 ML: .5; 3 SOLUTION RESPIRATORY (INHALATION) at 16:10

## 2022-08-25 RX ADMIN — MAGNESIUM GLUCONATE 500 MG ORAL TABLET 400 MG: 500 TABLET ORAL at 11:07

## 2022-08-25 RX ADMIN — METOCLOPRAMIDE 10 MG: 5 INJECTION, SOLUTION INTRAMUSCULAR; INTRAVENOUS at 04:41

## 2022-08-25 RX ADMIN — ACETAMINOPHEN 650 MG: 325 TABLET ORAL at 04:41

## 2022-08-25 RX ADMIN — ACETAMINOPHEN 650 MG: 325 TABLET ORAL at 11:09

## 2022-08-25 ASSESSMENT — PAIN SCALES - GENERAL
PAINLEVEL_OUTOF10: 0
PAINLEVEL_OUTOF10: 3
PAINLEVEL_OUTOF10: 0

## 2022-08-25 ASSESSMENT — PULMONARY FUNCTION TESTS
PIF_VALUE: 23
PIF_VALUE: 20
PIF_VALUE: 9
PIF_VALUE: 16
PIF_VALUE: 16
PIF_VALUE: 19
PIF_VALUE: 13
PIF_VALUE: 17
PIF_VALUE: 19
PIF_VALUE: 16
PIF_VALUE: 13
PIF_VALUE: 12
PIF_VALUE: 16
PIF_VALUE: 12
PIF_VALUE: 15
PIF_VALUE: 18

## 2022-08-25 ASSESSMENT — PAIN DESCRIPTION - LOCATION: LOCATION: GENERALIZED

## 2022-08-25 ASSESSMENT — PAIN SCALES - WONG BAKER: WONGBAKER_NUMERICALRESPONSE: 0

## 2022-08-25 NOTE — PROCEDURES
Referring physician: Camron Phillips,   Date: 8/25/2022  Start Time: 8/24/2022 @ 2100  End Time: 8/25/2022 @ 2100    Indication  Patient with encephalopathy, EEG done to rule out subclinical seizures. Introduction  This continuous video-EEG was acquired using a [employer] workstation at 256 samples/s. Electrodes were placed according to the International 10-20 system. Automated spike and seizure detection algorithms were applied. Video was recorded during this study. Description  During the maximal alert state, a poorly-regulated 7 Hz posterior dominant rhythm was seen which was symmetrical and attenuated to eye opening. There was continuous left hemispheric focal slowing or interhemispheric asymmetry was noted. Normal sleep structures were not observed. There were frequent left parieto-temporal sharps. Events  No events reported. Impression  Abnormal continuous vEEG recording, the slowing mentioned above suggests moderate non specific encephalopathy. The presence of left parieto-temporal sharps increases patient risk for focal seizures. The interhemispheric asymmetry suggest breach rhythm and underlying lesion/dysfunction on the left hemisphere. Slightly better reactivity when compared with previous       EKG lead did not show clear arrhythmia, if still in concern consider formal EKG or correlation with telemetry. Cristofer Gallardo MD  Epilepsy Board Certified. Neurology Board Certified.     Electronically Signed

## 2022-08-25 NOTE — ANESTHESIA POSTPROCEDURE EVALUATION
Department of Anesthesiology  Postprocedure Note    Patient: Seda Sultana  MRN: [id number]  YOB: 1945  Date of evaluation: 8/25/2022      Procedure Summary     Date: 08/23/22 Room / Location: [address]    Anesthesia Start: 2602 Anesthesia Stop: 5694    Procedure: LEFT CRANIOTOMY FOR SUBDURAL HEMATOMA EVACUATION (Left: Head) Diagnosis:       Subdural hematoma ([address]      (Subdural hematoma ([address] [C26.8H5L])    Surgeons: Lindsay Bingham DO Responsible Provider: Duong Foramn MD    Anesthesia Type: general ASA Status: 3          Anesthesia Type: No value filed.     Latoya Phase I:      Latoya Phase II:        Anesthesia Post Evaluation    Patient location during evaluation: ICU  Patient participation: complete - patient cannot participate  Level of consciousness: sedated and ventilated  Pain score: 0  Airway patency: patent  Nausea & Vomiting: no nausea and no vomiting  Complications: no  Cardiovascular status: hemodynamically stable  Respiratory status: acceptable, ventilator and intubated  Hydration status: stable

## 2022-08-25 NOTE — PROGRESS NOTES
ICU PROGRESS NOTE        PATIENT NAME: Janet [facility] RECORD NO. [id number]  DATE: 2022    PRIMARY CARE PHYSICIAN: Lorena Stoner MD    HD: # 3    ASSESSMENT    Patient Active Problem List   Diagnosis    Hypothyroidism    Major depressive disorder    Chronic midline low back pain without sciatica    Iron deficiency anemia    COPD (chronic obstructive pulmonary disease) (HCC)    Biventricular congestive heart failure (HCC)    Anemia    New onset a-fib (HCC)    Pulmonary fibrosis (HCC)    Subdural hematoma ([address]    Fall as cause of accidental injury in home as place of occurrence, initial encounter    Midline shift of brain due to hematoma [facility])       MEDICAL DECISION MAKING AND PLAN  SDH   POD 2 left craniotomy for evacuation of SDH   NS following   On LTME   On keppra-changed to PO    Acute respiratory failure   SBT today   Continue duoneb    Hypertension   Continue norvasc-consider increasing dose    Pain management   Continue tylenol, lyrica. Continue fentanyl and propofol gtts-wean to of as able   Start precedex and ketamine gtt   Start zyprexa   Start ibuprofen    GI   Continue tube feeds-advance to goal if not extubated today   Continue protonix and reglan    DVT proph   Start lovenox today    Hypophosphatemia  Hypokalemia  Hypomagnesemia   Replace electroytes   Recheck this afternoon    Hypothyroid   Continue synthroid    Depression   Continue zoloft   Consider restarting trazodone and cymbalta when extubated    Dispo    Continue ICU      Chief Complaint: \"n/a\"    SUBJECTIVE    Grace Glory  was seen and examined at bedside. Patient remains intubated. No acute events overnight. Per nursing, patient remains agitated when off sedation.       OBJECTIVE  VITALS: Temp: Temp: 98.6 °F (37 °C)Temp  Av °F (37.2 °C)  Min: 98.5 °F (36.9 °C)  Max: 99.4 °F (45.8 °C) BP Systolic (60WHP), CWF:429 , Min:122 , ZMJ:778   Diastolic (52WAC), BYU:26, Min:46, Max:46   Pulse Pulse  Av.6 Min: 58  Max: 82 Resp Resp  Av.7  Min: 10  Max: 24 Pulse ox SpO2  Av %  Min: 87 %  Max: 100 %    CONSTITUTIONAL: intubated and sedated  HEENT: drsg to scalp intact. JEOVANNY intact. LUNGS: clear. Diminished to bases  CV: regular rate and rhythm  GI: abd soft, bowel sounds active  MUSCULOSKELETAL: no deformity noted  NEUROLOGIC: intermittently follows commands  SKIN: bruising to face, warm, dry      Drain/tube output:    JEOVANNY 55 out since OR    LAB:  CBC:   Recent Labs     22  0506 22  0452   WBC 6.7 11.3 7.4   HGB 11.9 12.6 10.5*   HCT 37.0 36.7 31.2*   MCV 97.1 95.3 96.6    231 145       BMP:   Recent Labs     22  0506 22  0452   * 132* 137   K 3.4* 3.6* 3.5*    99 106   CO2  24   BUN 9 10 10   CREATININE 0.31* 0.36* 0.35*   GLUCOSE 122* 127* 115*           RADIOLOGY:  CXR :  Impression   1. Unchanged position of support devices. 2. No significant change in bilateral airspace disease.          RYAN Santoro CNP  22, 10:54 AM

## 2022-08-25 NOTE — PROGRESS NOTES
Neurosurgery LAUREN/Resident    Daily Progress Note   Chief Complaint   Patient presents with    Trauma     8/25/2022  1:17 PM    Chart reviewed. No acute events overnight. Vitals:    08/25/22 0600 08/25/22 0725 08/25/22 0836 08/25/22 1140   BP:   (!) 122/46    Pulse: 66 77  (S) 60   Resp: 16 24  (S) 14   Temp: 98.6 °F (37 °C)      TempSrc: Axillary      SpO2: 95% 97%  (S) 97%   Weight:       Height:         PE: Intubated, sedated on propofol and fentanyl-being weaned to extubate  E3 V1T M6  Opens eyes to voice   Follows commands throughout BUE and BLE     Drain output 120ml/12hr  Incision dressing c/d/i        Lab Results   Component Value Date    WBC 7.4 08/25/2022    HGB 10.5 (L) 08/25/2022    HCT 31.2 (L) 08/25/2022     08/25/2022    CHOL 251 (H) 06/20/2022    TRIG 84 08/25/2022    HDL 77 06/20/2022    ALT 11 06/20/2022    AST 20 06/20/2022     08/25/2022    K 3.5 (L) 08/25/2022     08/25/2022    CREATININE 0.35 (L) 08/25/2022    BUN 10 08/25/2022    CO2 24 08/25/2022    TSH 11.11 (H) 06/20/2022    INR 1.0 08/24/2022    LABA1C 5.7 06/20/2022    CRP 34.7 (H) 01/18/2022    SEDRATE 5 07/30/2020   Events  No events reported. Impression  Abnormal continuous vEEG recording, the slowing mentioned above suggests moderate non specific encephalopathy. The presence of left parieto-temporal sharps increases patient risk for focal seizures. The interhemispheric asymmetry suggest breach rhythm and underlying lesion/dysfunction on the left hemisphere. Slightly better reactivity when compared with previous        EKG lead did not show clear arrhythmia, if still in concern consider formal EKG or correlation with telemetry.      Radiology   CT Head wo Contrast    Result Date: 8/24/2022  EXAMINATION: CT OF THE HEAD WITHOUT CONTRAST  8/24/2022 3:48 am TECHNIQUE: CT of the head was performed without the administration of intravenous contrast. Automated exposure control, iterative reconstruction, and/or weight based adjustment of the mA/kV was utilized to reduce the radiation dose to as low as reasonably achievable. COMPARISON: 08/23/2022 HISTORY: ORDERING SYSTEM PROVIDED HISTORY: post op TECHNOLOGIST PROVIDED HISTORY: post op FINDINGS: BRAIN/VENTRICLES: There has been interval left craniotomy with skull defect extending from frontal through parietal and temporal lobe. There is left subdural blood underlying the craniotomy defect which has decreased in size from prior. .  This extends from frontal to occipital region with small amount tracking along the falx and tentorium. Gas in the subdural region consistent with postsurgical changes. Slight improvement in left right midline shift about 4 mm compared to 6 mm on prior. ORBITS: The visualized portion of the orbits demonstrate no acute abnormality. SINUSES: The visualized paranasal sinuses and mastoid air cells demonstrate no acute abnormality. SOFT TISSUES/SKULL:  Extensive left craniotomy. Overlying edema in the scalp and skin staples consistent with expected postsurgical changes. Few gas bubbles in the overlying soft tissues. Partially imaged surgical drain terminating in the left temporal soft tissues. 1. Status post left hemispheric craniotomy with presumed evacuation of subdural hematoma. Expected postsurgical changes of subdural and subcutaneous tissue gas at the surgical site. Subdural blood has decreased and there is lesser degree of left right mediastinal shift. A/P  left sided SDH  POD#2 s/p left craniotomy for evacuation of SDH    - extubate per primary service   - remove JEOVANNY drain today  - continue HOB >30  - continue SBP goal 100-160  - SCDs for dvt ppx and lovenox tomorrow   - continue LTME for another day, continue keppra at discharge  - hourly neuro checks       Please contact neurosurgery with any changes in patients neurologic status.        Harris Jonas CNP  8/25/22  1:17 PM

## 2022-08-25 NOTE — CARE COORDINATION
Per Dr. Anthony Pimentel, pt will likely need SNF. Referral to the [facility], per prior CM note. Placed call to the [facility] (formerly [facility]), they have not yet received fax, will contact writer if it does not come through. 1204 - Per Bisi at the [facility], they received referral, will review and will notify writer if they are able to accept.

## 2022-08-25 NOTE — PROGRESS NOTES
Order obtain for extubation. SpO2 of 97 on 30% FiO2. Patient extubated and placed on 2 liters/min via nasal cannula. Post extubation SpO2 is 96% with HR  62 bpm and RR 18 breaths/min. Patient had moderate cough that was non-productive. Extubation Well tolerated by patient. .   Breath Sounds: clear    Pantera Blakely RCP   12:32 PM

## 2022-08-25 NOTE — PLAN OF CARE
Problem: Discharge Planning  Goal: Discharge to home or other facility with appropriate resources  Outcome: Progressing     Problem: Pain  Goal: Verbalizes/displays adequate comfort level or baseline comfort level  Outcome: Progressing  Flowsheets (Taken 8/25/2022 0000)  Verbalizes/displays adequate comfort level or baseline comfort level:   Encourage patient to monitor pain and request assistance   Assess pain using appropriate pain scale   Consider cultural and social influences on pain and pain management   Implement non-pharmacological measures as appropriate and evaluate response   Administer analgesics based on type and severity of pain and evaluate response   Notify Licensed Independent Practitioner if interventions unsuccessful or patient reports new pain     Problem: Skin/Tissue Integrity  Goal: Absence of new skin breakdown  Description: 1. Monitor for areas of redness and/or skin breakdown  2. Assess vascular access sites hourly  3. Every 4-6 hours minimum:  Change oxygen saturation probe site  4. Every 4-6 hours:  If on nasal continuous positive airway pressure, respiratory therapy assess nares and determine need for appliance change or resting period.   Outcome: Progressing     Problem: Safety - Adult  Goal: Free from fall injury  Outcome: Progressing  Flowsheets (Taken 8/24/2022 2310)  Free From Fall Injury:   Instruct family/caregiver on patient safety   Based on caregiver fall risk screen, instruct family/caregiver to ask for assistance with transferring infant if caregiver noted to have fall risk factors     Problem: ABCDS Injury Assessment  Goal: Absence of physical injury  Outcome: Progressing  Flowsheets (Taken 8/24/2022 2310)  Absence of Physical Injury: Implement safety measures based on patient assessment     Problem: Chronic Conditions and Co-morbidities  Goal: Patient's chronic conditions and co-morbidity symptoms are monitored and maintained or improved  Outcome: Progressing Problem: Safety - Medical Restraint  Goal: Remains free of injury from restraints (Restraint for Interference with Medical Device)  Description: INTERVENTIONS:  1. Determine that other, less restrictive measures have been tried or would not be effective before applying the restraint  2. Evaluate the patient's condition at the time of restraint application  3. Inform patient/family regarding the reason for restraint  4.  Q2H: Monitor safety, psychosocial status, comfort, nutrition and hydration  Outcome: Progressing  Flowsheets  Taken 8/24/2022 2000 by Katherine Trevino RN  Remains free of injury from restraints (restraint for interference with medical device):   Determine that other, less restrictive measures have been tried or would not be effective before applying the restraint   Evaluate the patient's condition at the time of restraint application   Every 2 hours: Monitor safety, psychosocial status, comfort, nutrition and hydration  Taken 8/24/2022 1800 by Delano Mcclure RN  Remains free of injury from restraints (restraint for interference with medical device): Every 2 hours: Monitor safety, psychosocial status, comfort, nutrition and hydration  Taken 8/24/2022 1642 by Delano Mcclure RN  Remains free of injury from restraints (restraint for interference with medical device): Every 2 hours: Monitor safety, psychosocial status, comfort, nutrition and hydration  Taken 8/24/2022 1600 by Delano Mcclure RN  Remains free of injury from restraints (restraint for interference with medical device): Every 2 hours: Monitor safety, psychosocial status, comfort, nutrition and hydration     Problem: Nutrition Deficit:  Goal: Optimize nutritional status  Outcome: Progressing

## 2022-08-25 NOTE — PLAN OF CARE
Problem: Discharge Planning  Goal: Discharge to home or other facility with appropriate resources  8/25/2022 1342 by Blaise Sharp RN  Outcome: Progressing  Flowsheets (Taken 8/25/2022 0800)  Discharge to home or other facility with appropriate resources: Identify barriers to discharge with patient and caregiver  8/25/2022 0736 by Joby Ramirez RCP  Outcome: Progressing  8/25/2022 0516 by Fiona Stroud RN  Outcome: Progressing     Problem: Pain  Goal: Verbalizes/displays adequate comfort level or baseline comfort level  8/25/2022 1342 by Blaise Sharp RN  Outcome: Progressing  8/25/2022 0736 by Joby Ramirez RCP  Outcome: Progressing  8/25/2022 0516 by Fiona Stroud RN  Outcome: Progressing  Flowsheets  Taken 8/25/2022 0000  Verbalizes/displays adequate comfort level or baseline comfort level:   Encourage patient to monitor pain and request assistance   Assess pain using appropriate pain scale   Consider cultural and social influences on pain and pain management   Implement non-pharmacological measures as appropriate and evaluate response   Administer analgesics based on type and severity of pain and evaluate response   Notify Licensed Independent Practitioner if interventions unsuccessful or patient reports new pain  Taken 8/24/2022 2000  Verbalizes/displays adequate comfort level or baseline comfort level:   Encourage patient to monitor pain and request assistance   Assess pain using appropriate pain scale   Administer analgesics based on type and severity of pain and evaluate response   Implement non-pharmacological measures as appropriate and evaluate response   Consider cultural and social influences on pain and pain management   Notify Licensed Independent Practitioner if interventions unsuccessful or patient reports new pain     Problem: Skin/Tissue Integrity  Goal: Absence of new skin breakdown  Description: 1. Monitor for areas of redness and/or skin breakdown  2.   Assess vascular access sites hourly  3. Every 4-6 hours minimum:  Change oxygen saturation probe site  4. Every 4-6 hours:  If on nasal continuous positive airway pressure, respiratory therapy assess nares and determine need for appliance change or resting period.   8/25/2022 1342 by Vinod Allen RN  Outcome: Progressing  8/25/2022 0736 by Denzel Bloom RCP  Outcome: Progressing  8/25/2022 0516 by Adolph Gonzalez RN  Outcome: Progressing     Problem: Safety - Adult  Goal: Free from fall injury  8/25/2022 1342 by Vinod Allen RN  Outcome: Progressing  Flowsheets (Taken 8/25/2022 1300)  Free From Fall Injury: Instruct family/caregiver on patient safety  8/25/2022 0736 by Denzel Bloom RCP  Outcome: Progressing  8/25/2022 0516 by Adolph Gonzalez RN  Outcome: Progressing  Flowsheets (Taken 8/24/2022 2310)  Free From Fall Injury:   Fam Mckeon family/caregiver on patient safety   Based on caregiver fall risk screen, instruct family/caregiver to ask for assistance with transferring infant if caregiver noted to have fall risk factors     Problem: ABCDS Injury Assessment  Goal: Absence of physical injury  8/25/2022 1342 by Vinod Allen RN  Outcome: Progressing  Flowsheets (Taken 8/25/2022 1300)  Absence of Physical Injury: Implement safety measures based on patient assessment  8/25/2022 0736 by Denzel Bloom RCP  Outcome: Progressing  8/25/2022 0516 by Adolph Gonzalez RN  Outcome: Progressing  Flowsheets (Taken 8/24/2022 2310)  Absence of Physical Injury: Implement safety measures based on patient assessment     Problem: Chronic Conditions and Co-morbidities  Goal: Patient's chronic conditions and co-morbidity symptoms are monitored and maintained or improved  8/25/2022 1342 by Vinod Allen RN  Outcome: Progressing  Flowsheets (Taken 8/25/2022 0800)  Care Plan - Patient's Chronic Conditions and Co-Morbidity Symptoms are Monitored and Maintained or Improved: Monitor and assess patient's chronic conditions and comorbid symptoms for stability, deterioration, or improvement  8/25/2022 0736 by Pippa Melo RCP  Outcome: Progressing  8/25/2022 0516 by Dede Becerril RN  Outcome: Progressing     Problem: Nutrition Deficit:  Goal: Optimize nutritional status  8/25/2022 1342 by Gertrude Ferris RN  Outcome: Progressing  8/25/2022 0736 by Pippa Melo RCP  Outcome: Progressing  8/25/2022 0516 by Dede Becerril RN  Outcome: Progressing     Problem: Respiratory - Adult  Goal: Achieves optimal ventilation and oxygenation  8/25/2022 1342 by Gertrude Ferris RN  Outcome: Progressing  8/25/2022 0736 by Pippa Melo RCP  Outcome: Progressing

## 2022-08-25 NOTE — PLAN OF CARE
Drain Removal Note    []Subdural Drain   [x]Subgaleal Drain  []Ventriculostomy Drain    Drain removed from suction, prepped with betadine. Drain suture cut and drain removed. Two staples placed over drains site. No complications, patient tolerated procedure well.     --  Preeti Díaz CNP  1:48 PM EDT

## 2022-08-26 ENCOUNTER — APPOINTMENT (OUTPATIENT)
Dept: GENERAL RADIOLOGY | Age: 77
DRG: 025 | End: 2022-08-26
Payer: MEDICARE

## 2022-08-26 LAB
ABSOLUTE EOS #: <0.03 K/UL (ref 0–0.44)
ABSOLUTE IMMATURE GRANULOCYTE: 0.05 K/UL (ref 0–0.3)
ABSOLUTE LYMPH #: 0.7 K/UL (ref 1.1–3.7)
ABSOLUTE MONO #: 1 K/UL (ref 0.1–1.2)
ANION GAP SERPL CALCULATED.3IONS-SCNC: 10 MMOL/L (ref 9–17)
BASOPHILS # BLD: 0 % (ref 0–2)
BASOPHILS ABSOLUTE: 0.03 K/UL (ref 0–0.2)
BUN BLDV-MCNC: 8 MG/DL (ref 8–23)
CALCIUM IONIZED: 1.14 MMOL/L (ref 1.13–1.33)
CALCIUM SERPL-MCNC: 8.5 MG/DL (ref 8.6–10.4)
CHLORIDE BLD-SCNC: 105 MMOL/L (ref 98–107)
CO2: 25 MMOL/L (ref 20–31)
CREAT SERPL-MCNC: 0.33 MG/DL (ref 0.5–0.9)
EOSINOPHILS RELATIVE PERCENT: 0 % (ref 1–4)
GFR AFRICAN AMERICAN: >60 ML/MIN
GFR NON-AFRICAN AMERICAN: >60 ML/MIN
GFR SERPL CREATININE-BSD FRML MDRD: ABNORMAL ML/MIN/{1.73_M2}
GLUCOSE BLD-MCNC: 129 MG/DL (ref 65–105)
GLUCOSE BLD-MCNC: 129 MG/DL (ref 70–99)
GLUCOSE BLD-MCNC: 135 MG/DL (ref 65–105)
HCT VFR BLD CALC: 36.3 % (ref 36.3–47.1)
HEMOGLOBIN: 12 G/DL (ref 11.9–15.1)
IMMATURE GRANULOCYTES: 0 %
LYMPHOCYTES # BLD: 6 % (ref 24–43)
MAGNESIUM: 2 MG/DL (ref 1.6–2.6)
MCH RBC QN AUTO: 32.1 PG (ref 25.2–33.5)
MCHC RBC AUTO-ENTMCNC: 33.1 G/DL (ref 28.4–34.8)
MCV RBC AUTO: 97.1 FL (ref 82.6–102.9)
MONOCYTES # BLD: 9 % (ref 3–12)
NRBC AUTOMATED: 0 PER 100 WBC
PDW BLD-RTO: 14.6 % (ref 11.8–14.4)
PLATELET # BLD: 183 K/UL (ref 138–453)
PMV BLD AUTO: 10.5 FL (ref 8.1–13.5)
POTASSIUM SERPL-SCNC: 3.8 MMOL/L (ref 3.7–5.3)
RBC # BLD: 3.74 M/UL (ref 3.95–5.11)
RBC # BLD: ABNORMAL 10*6/UL
SEG NEUTROPHILS: 85 % (ref 36–65)
SEGMENTED NEUTROPHILS ABSOLUTE COUNT: 9.87 K/UL (ref 1.5–8.1)
SODIUM BLD-SCNC: 140 MMOL/L (ref 135–144)
WBC # BLD: 11.7 K/UL (ref 3.5–11.3)

## 2022-08-26 PROCEDURE — 2500000003 HC RX 250 WO HCPCS

## 2022-08-26 PROCEDURE — 85025 COMPLETE CBC W/AUTO DIFF WBC: CPT

## 2022-08-26 PROCEDURE — 82947 ASSAY GLUCOSE BLOOD QUANT: CPT

## 2022-08-26 PROCEDURE — 6370000000 HC RX 637 (ALT 250 FOR IP)

## 2022-08-26 PROCEDURE — 95714 VEEG EA 12-26 HR UNMNTR: CPT

## 2022-08-26 PROCEDURE — 6370000000 HC RX 637 (ALT 250 FOR IP): Performed by: NURSE PRACTITIONER

## 2022-08-26 PROCEDURE — 94761 N-INVAS EAR/PLS OXIMETRY MLT: CPT

## 2022-08-26 PROCEDURE — 95720 EEG PHY/QHP EA INCR W/VEEG: CPT | Performed by: PSYCHIATRY & NEUROLOGY

## 2022-08-26 PROCEDURE — 2060000000 HC ICU INTERMEDIATE R&B

## 2022-08-26 PROCEDURE — 6370000000 HC RX 637 (ALT 250 FOR IP): Performed by: REGISTERED NURSE

## 2022-08-26 PROCEDURE — 80048 BASIC METABOLIC PNL TOTAL CA: CPT

## 2022-08-26 PROCEDURE — 6360000002 HC RX W HCPCS: Performed by: NURSE PRACTITIONER

## 2022-08-26 PROCEDURE — 2700000000 HC OXYGEN THERAPY PER DAY

## 2022-08-26 PROCEDURE — 97530 THERAPEUTIC ACTIVITIES: CPT

## 2022-08-26 PROCEDURE — 82330 ASSAY OF CALCIUM: CPT

## 2022-08-26 PROCEDURE — 2580000003 HC RX 258: Performed by: PHYSICIAN ASSISTANT

## 2022-08-26 PROCEDURE — 97163 PT EVAL HIGH COMPLEX 45 MIN: CPT

## 2022-08-26 PROCEDURE — 6370000000 HC RX 637 (ALT 250 FOR IP): Performed by: STUDENT IN AN ORGANIZED HEALTH CARE EDUCATION/TRAINING PROGRAM

## 2022-08-26 PROCEDURE — 71045 X-RAY EXAM CHEST 1 VIEW: CPT

## 2022-08-26 PROCEDURE — 97167 OT EVAL HIGH COMPLEX 60 MIN: CPT

## 2022-08-26 PROCEDURE — 99024 POSTOP FOLLOW-UP VISIT: CPT | Performed by: NEUROLOGICAL SURGERY

## 2022-08-26 PROCEDURE — 36415 COLL VENOUS BLD VENIPUNCTURE: CPT

## 2022-08-26 PROCEDURE — 2580000003 HC RX 258: Performed by: STUDENT IN AN ORGANIZED HEALTH CARE EDUCATION/TRAINING PROGRAM

## 2022-08-26 PROCEDURE — 6370000000 HC RX 637 (ALT 250 FOR IP): Performed by: PHYSICIAN ASSISTANT

## 2022-08-26 PROCEDURE — 83735 ASSAY OF MAGNESIUM: CPT

## 2022-08-26 PROCEDURE — 6360000002 HC RX W HCPCS: Performed by: STUDENT IN AN ORGANIZED HEALTH CARE EDUCATION/TRAINING PROGRAM

## 2022-08-26 PROCEDURE — 94640 AIRWAY INHALATION TREATMENT: CPT

## 2022-08-26 RX ORDER — OXYCODONE HYDROCHLORIDE 5 MG/1
2.5 TABLET ORAL ONCE
Status: COMPLETED | OUTPATIENT
Start: 2022-08-26 | End: 2022-08-26

## 2022-08-26 RX ORDER — AMLODIPINE BESYLATE 10 MG/1
10 TABLET ORAL DAILY
Status: DISCONTINUED | OUTPATIENT
Start: 2022-08-26 | End: 2022-08-30 | Stop reason: HOSPADM

## 2022-08-26 RX ADMIN — METOCLOPRAMIDE 10 MG: 5 INJECTION, SOLUTION INTRAMUSCULAR; INTRAVENOUS at 10:23

## 2022-08-26 RX ADMIN — SODIUM CHLORIDE 1000 ML: 9 INJECTION, SOLUTION INTRAVENOUS at 04:07

## 2022-08-26 RX ADMIN — METOCLOPRAMIDE 10 MG: 5 INJECTION, SOLUTION INTRAMUSCULAR; INTRAVENOUS at 18:14

## 2022-08-26 RX ADMIN — IPRATROPIUM BROMIDE AND ALBUTEROL SULFATE 3 ML: .5; 3 SOLUTION RESPIRATORY (INHALATION) at 07:46

## 2022-08-26 RX ADMIN — OXYCODONE 2.5 MG: 5 TABLET ORAL at 20:32

## 2022-08-26 RX ADMIN — IPRATROPIUM BROMIDE AND ALBUTEROL SULFATE 3 ML: .5; 3 SOLUTION RESPIRATORY (INHALATION) at 12:54

## 2022-08-26 RX ADMIN — SODIUM CHLORIDE, PRESERVATIVE FREE 10 ML: 5 INJECTION INTRAVENOUS at 09:10

## 2022-08-26 RX ADMIN — ACETAMINOPHEN 650 MG: 325 TABLET ORAL at 18:14

## 2022-08-26 RX ADMIN — LEVETIRACETAM 1000 MG: 500 SOLUTION ORAL at 20:32

## 2022-08-26 RX ADMIN — SERTRALINE 50 MG: 50 TABLET, FILM COATED ORAL at 09:09

## 2022-08-26 RX ADMIN — IPRATROPIUM BROMIDE AND ALBUTEROL SULFATE 3 ML: .5; 3 SOLUTION RESPIRATORY (INHALATION) at 16:47

## 2022-08-26 RX ADMIN — ENOXAPARIN SODIUM 30 MG: 100 INJECTION SUBCUTANEOUS at 09:09

## 2022-08-26 RX ADMIN — Medication 30 MG: at 09:51

## 2022-08-26 RX ADMIN — OLANZAPINE 5 MG: 5 TABLET, FILM COATED ORAL at 20:31

## 2022-08-26 RX ADMIN — ACETAMINOPHEN 650 MG: 325 TABLET ORAL at 23:51

## 2022-08-26 RX ADMIN — LEVETIRACETAM 1000 MG: 500 SOLUTION ORAL at 09:09

## 2022-08-26 RX ADMIN — POLYETHYLENE GLYCOL 3350 17 G: 17 POWDER, FOR SOLUTION ORAL at 09:09

## 2022-08-26 RX ADMIN — SODIUM CHLORIDE, PRESERVATIVE FREE 10 ML: 5 INJECTION INTRAVENOUS at 20:33

## 2022-08-26 RX ADMIN — IPRATROPIUM BROMIDE AND ALBUTEROL SULFATE 3 ML: .5; 3 SOLUTION RESPIRATORY (INHALATION) at 21:26

## 2022-08-26 RX ADMIN — ACETAMINOPHEN 650 MG: 325 TABLET ORAL at 04:13

## 2022-08-26 RX ADMIN — LEVOTHYROXINE SODIUM 200 MCG: 125 TABLET ORAL at 09:49

## 2022-08-26 RX ADMIN — METOCLOPRAMIDE 10 MG: 5 INJECTION, SOLUTION INTRAMUSCULAR; INTRAVENOUS at 23:51

## 2022-08-26 RX ADMIN — ACETAMINOPHEN 650 MG: 325 TABLET ORAL at 11:13

## 2022-08-26 RX ADMIN — AMLODIPINE BESYLATE 10 MG: 5 TABLET ORAL at 09:09

## 2022-08-26 RX ADMIN — METOCLOPRAMIDE 10 MG: 5 INJECTION, SOLUTION INTRAMUSCULAR; INTRAVENOUS at 04:04

## 2022-08-26 RX ADMIN — PREGABALIN 300 MG: 100 CAPSULE ORAL at 20:31

## 2022-08-26 RX ADMIN — PREGABALIN 300 MG: 100 CAPSULE ORAL at 09:09

## 2022-08-26 RX ADMIN — STANDARDIZED SENNA CONCENTRATE AND DOCUSATE SODIUM 1 TABLET: 8.6; 5 TABLET ORAL at 20:31

## 2022-08-26 RX ADMIN — STANDARDIZED SENNA CONCENTRATE AND DOCUSATE SODIUM 1 TABLET: 8.6; 5 TABLET ORAL at 09:09

## 2022-08-26 RX ADMIN — ENOXAPARIN SODIUM 30 MG: 100 INJECTION SUBCUTANEOUS at 20:31

## 2022-08-26 RX ADMIN — Medication 10 MG: at 10:22

## 2022-08-26 ASSESSMENT — PAIN SCALES - GENERAL
PAINLEVEL_OUTOF10: 10
PAINLEVEL_OUTOF10: 2
PAINLEVEL_OUTOF10: 10
PAINLEVEL_OUTOF10: 0
PAINLEVEL_OUTOF10: 3
PAINLEVEL_OUTOF10: 0

## 2022-08-26 NOTE — PLAN OF CARE
Problem: Discharge Planning  Goal: Discharge to home or other facility with appropriate resources  8/26/2022 0008 by Katherine Trevino RN  Outcome: Progressing  Flowsheets (Taken 8/25/2022 2000)  Discharge to home or other facility with appropriate resources:   Identify barriers to discharge with patient and caregiver   Arrange for needed discharge resources and transportation as appropriate   Identify discharge learning needs (meds, wound care, etc)   Refer to discharge planning if patient needs post-hospital services based on physician order or complex needs related to functional status, cognitive ability or social support system  8/25/2022 1342 by Delano Mcclure RN  Outcome: Progressing  Flowsheets (Taken 8/25/2022 0800)  Discharge to home or other facility with appropriate resources: Identify barriers to discharge with patient and caregiver     Problem: Pain  Goal: Verbalizes/displays adequate comfort level or baseline comfort level  8/26/2022 0008 by Katherine Trevino RN  Outcome: Progressing  Flowsheets  Taken 8/25/2022 2000 by Katherine Trevino RN  Verbalizes/displays adequate comfort level or baseline comfort level:   Encourage patient to monitor pain and request assistance   Assess pain using appropriate pain scale   Administer analgesics based on type and severity of pain and evaluate response   Implement non-pharmacological measures as appropriate and evaluate response   Notify Licensed Independent Practitioner if interventions unsuccessful or patient reports new pain   Consider cultural and social influences on pain and pain management  Taken 8/25/2022 1600 by Delano Mcclure RN  Verbalizes/displays adequate comfort level or baseline comfort level: Encourage patient to monitor pain and request assistance  8/25/2022 1342 by Delano Mcclure RN  Outcome: Progressing     Problem: Skin/Tissue Integrity  Goal: Absence of new skin breakdown  Description: 1.   Monitor for areas of redness and/or skin breakdown  2. Assess vascular access sites hourly  3. Every 4-6 hours minimum:  Change oxygen saturation probe site  4. Every 4-6 hours:  If on nasal continuous positive airway pressure, respiratory therapy assess nares and determine need for appliance change or resting period.   8/26/2022 0008 by Casper Connelly RN  Outcome: Progressing  8/25/2022 1342 by Mary Ann Daniels RN  Outcome: Progressing     Problem: Safety - Adult  Goal: Free from fall injury  8/26/2022 0008 by Casper Connelly RN  Outcome: Progressing  Flowsheets (Taken 8/25/2022 2137)  Free From Fall Injury:   Instruct family/caregiver on patient safety   Based on caregiver fall risk screen, instruct family/caregiver to ask for assistance with transferring infant if caregiver noted to have fall risk factors  8/25/2022 1342 by Mary Ann Daniels RN  Outcome: Progressing  Flowsheets (Taken 8/25/2022 1300)  Free From Fall Injury: Instruct family/caregiver on patient safety     Problem: ABCDS Injury Assessment  Goal: Absence of physical injury  8/26/2022 0008 by Casper Connelly RN  Outcome: Progressing  Flowsheets (Taken 8/25/2022 2137)  Absence of Physical Injury: Implement safety measures based on patient assessment  8/25/2022 1342 by Mary Ann Daniels RN  Outcome: Progressing  Flowsheets (Taken 8/25/2022 1300)  Absence of Physical Injury: Implement safety measures based on patient assessment     Problem: Chronic Conditions and Co-morbidities  Goal: Patient's chronic conditions and co-morbidity symptoms are monitored and maintained or improved  8/26/2022 0008 by Casper Connelly RN  Outcome: Progressing  Flowsheets (Taken 8/25/2022 2000)  Care Plan - Patient's Chronic Conditions and Co-Morbidity Symptoms are Monitored and Maintained or Improved:   Monitor and assess patient's chronic conditions and comorbid symptoms for stability, deterioration, or improvement   Collaborate with multidisciplinary team to address chronic and comorbid conditions and prevent exacerbation or deterioration   Update acute care plan with appropriate goals if chronic or comorbid symptoms are exacerbated and prevent overall improvement and discharge  8/25/2022 1342 by Kathleen Segura RN  Outcome: Progressing  Flowsheets (Taken 8/25/2022 0800)  Care Plan - Patient's Chronic Conditions and Co-Morbidity Symptoms are Monitored and Maintained or Improved: Monitor and assess patient's chronic conditions and comorbid symptoms for stability, deterioration, or improvement     Problem: Nutrition Deficit:  Goal: Optimize nutritional status  8/26/2022 0008 by Sylvie Santana RN  Outcome: Progressing  8/25/2022 1342 by Kathleen Segura RN  Outcome: Progressing     Problem: Respiratory - Adult  Goal: Achieves optimal ventilation and oxygenation  8/26/2022 0008 by Sylvie Santana RN  Outcome: Progressing  Flowsheets (Taken 8/25/2022 2000)  Achieves optimal ventilation and oxygenation:   Assess for changes in respiratory status   Assess for changes in mentation and behavior   Position to facilitate oxygenation and minimize respiratory effort   Oxygen supplementation based on oxygen saturation or arterial blood gases   Encourage broncho-pulmonary hygiene including cough, deep breathe, incentive spirometry   Assess the need for suctioning and aspirate as needed   Assess and instruct to report shortness of breath or any respiratory difficulty   Respiratory therapy support as indicated  8/25/2022 1342 by Kathleen Segura RN  Outcome: Progressing

## 2022-08-26 NOTE — PROGRESS NOTES
Neurosurgery LAUREN/Resident    Daily Progress Note   Chief Complaint   Patient presents with    Trauma     8/26/2022  11:01 AM    Chart reviewed. No acute events overnight. Extubated yesterday. Alert today following all commands. LTME intact. Vitals:    08/26/22 0800 08/26/22 0900 08/26/22 0909 08/26/22 1000   BP: (!) 147/62 (!) 152/60 (!) 152/60 (!) 148/72   Pulse: 77 80  75   Resp: 19 19 17   Temp: 99.1 °F (37.3 °C)      TempSrc: Oral      SpO2: 92% 91%  92%   Weight:       Height:             PE:   Alert, dysarthric but able to state name  Follow all commands  Motor   Moving all extremities   Antigravity to bilat UE and LE    Incision cranial site open to air, dry and clean      Lab Results   Component Value Date    WBC 11.7 (H) 08/26/2022    HGB 12.0 08/26/2022    HCT 36.3 08/26/2022     08/26/2022    CHOL 251 (H) 06/20/2022    TRIG 84 08/25/2022    HDL 77 06/20/2022    ALT 11 06/20/2022    AST 20 06/20/2022     08/26/2022    K 3.8 08/26/2022     08/26/2022    CREATININE 0.33 (L) 08/26/2022    BUN 8 08/26/2022    CO2 25 08/26/2022    TSH 11.11 (H) 06/20/2022    INR 1.0 08/24/2022    LABA1C 5.7 06/20/2022    CRP 34.7 (H) 01/18/2022    SEDRATE 5 07/30/2020   Events  No events reported. Impression  Abnormal continuous vEEG recording, the slowing mentioned above suggests moderate non specific encephalopathy. The presence of left parieto-temporal sharps increases patient risk for focal seizures. The interhemispheric asymmetry suggest breach rhythm and underlying lesion/dysfunction on the left hemisphere. Over all no major changes. A/P  left sided SDH  POD#3 s/p left craniotomy for evacuation of SDH     - continue HOB >30  - continue SBP goal 100-160  - SCDs for dvt ppx and lovenox tomorrow   - okay to discontinue LTME today  - neuro checks, okay to transfer out of ICU      Please contact neurosurgery with any changes in patients neurologic status.        Altagracia Mascorro, CNP  8/26/22  11:01 AM

## 2022-08-26 NOTE — CARE COORDINATION
spoke with Aditya Dunn at the [place] [id number], she questioned if pt had a Lifevest still as pt had a Lifevest when she was at their facility previously. Writer asked pt, and per pt, she does have a Lifevest at home.  updated Aditya Dunn, and per Aditya Dunn, SNF referral is in review, they would like pt to have a PEG if tube feeding is to continue.  spoke with Dr. Laurie Yun, and per Dr. Laurie Yun, pt may be able to swallow in a few days. Bisi updated, and per Aditya Dunn, they will review pt again for admission on Monday. Pt has traditional Medicare, will not need a pre-cert.

## 2022-08-26 NOTE — PROGRESS NOTES
Physical Therapy  Facility/Department: [facility] 1- SICU  Physical Therapy Initial Assessment    Name: Carter Mao  : 1945  MRN: [id number]  Date of Service: 2022    Discharge Recommendations:  Patient would benefit from continued therapy after discharge   PT Equipment Recommendations  Equipment Needed: No (pt unsafe to perform functional mobility unassisted)      Patient Diagnosis(es): The encounter diagnosis was Subdural hematoma ([address] Past Medical History:  has a past medical history of A-fib ([address] Biventricular congestive heart failure ([address] Bronchiectasis with acute exacerbation ([address] CAD (coronary artery disease), Chronic pain syndrome, COPD (chronic obstructive pulmonary disease) ([address] Depression, GERD (gastroesophageal reflux disease), Hypothyroidism, Impaired fasting glucose, Iron deficiency anemia, Osteoporosis, Pulmonary fibrosis ([address] and Subdural hematoma ([address] Past Surgical History:  has a past surgical history that includes Hysterectomy (1991); Carpal tunnel release (Right, 1999); Total knee arthroplasty (Right, 2021); fracture surgery (Left, 2018); Wrist fracture surgery (Left, 2018); lumbar discectomy (1993); Lumbar disc surgery (02/15/1994); back surgery (1998); back surgery (1999); Carpal tunnel release (Left, 1999); Neck surgery (2002); Carpal tunnel release (Right, 2002); Carpal tunnel release (Left, 2003); back surgery (10/23/2003); back surgery (10/23/2003); Cervical disc surgery (10/25/2004); Cervical disc surgery (2004); Neck surgery (2005); Toe amputation (10/08/2006); Toe amputation (2008); lumbar laminectomy (2008); Toe amputation (10/03/2008); Humerus fracture surgery (Right, 10/03/2010); Knee arthroscopy (Right, 2011); back surgery (2017); knee surgery (Right, 2016); Wrist fracture surgery (Left, 2018);  Wrist surgery (Left, 2019); and craniotomy (Left, 8/23/2022). Assessment   Body Structures, Functions, Activity Limitations Requiring Skilled Therapeutic Intervention: Decreased functional mobility ; Decreased cognition;Decreased coordination;Decreased endurance;Decreased ROM; Decreased balance; Increased pain;Decreased strength;Decreased fine motor control  Assessment: Pt required modAx2 to perform bed mobility, modA to maintain seated balance while EOB. Pt is a high fall risk and unsafe to return to prior living arrangements at this time. Pt is expected to require skilled physical therapy to address decreased strength, attempt functional transfers and gait trial to maximize to prior level of function upon discharge. Therapy Prognosis: Fair  Decision Making: High Complexity  Barriers to Learning: pt's cognition  Requires PT Follow-Up: Yes  Activity Tolerance  Activity Tolerance: Patient limited by fatigue;Patient limited by endurance;Treatment limited secondary to decreased cognition     Plan   Plan  Plan:  (5-6x/week)  Current Treatment Recommendations: Strengthening, ROM, Balance training, Gait training, Equipment evaluation, education, & procurement, Transfer training, Endurance training, Patient/Caregiver education & training, Safety education & training, Therapeutic activities  Safety Devices  Type of Devices: Nurse notified, Call light within reach, Bed alarm in place, Left in bed  Restraints  Restraints Initially in Place: No     Restrictions  Restrictions/Precautions  Restrictions/Precautions: Fall Risk  Required Braces or Orthoses?: No  Position Activity Restriction  Other position/activity restrictions:  Up with assist, -140, s/p L craniotomy for SDH evacuation; HOB >30 degrees     Subjective   General  Patient assessed for rehabilitation services?: Yes  Response To Previous Treatment: Not applicable  Family / Caregiver Present: No  Follows Commands: Within Functional Limits  Subjective  Subjective: RN and pt in agreement for PT navin; pt supie in bed upon PT arrival, pt lethargic, does not verbalize throughout session however, cooperative throughout         Social/Functional History  Social/Functional History  Lives With: Spouse (Dog)  Type of Home: House  Home Layout: Two level, Able to Live on Main level with bedroom/bathroom  Home Access: Stairs to enter without rails  Entrance Stairs - Number of Steps: 1+1 SAUNDRA through garage, typically use garage entry  Bathroom Shower/Tub: Tub/Shower unit  [employer]: Standard  Bathroom Equipment: Grab bars in shower, Commode, Shower chair  Home Equipment: Cane, quad, Walker, rolling (transport chair; typically uses cane for functional mobility)  Receives Help From: Family  ADL Assistance: [facility] [address]: Independent  Homemaking Responsibilities: Yes ( cleans and grocery shops, is able to complete all household management tasks)  Laundry Responsibility: Primary  Cleaning Responsibility: Primary  Shopping Responsibility: Secondary  Ambulation Assistance: Independent (Pt's  reports that pt has difficulty with functional mobility long distances outside the home)  Transfer Assistance: Independent  Active : No  Patient's  Info:  drives PRN; children in area available to drive longer distances  Mode of Transportation: [employer]  Occupation: Retired  Type of Occupation: 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; 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  Leisure & Hobbies: Going camping  Additional Comments: Social functional information obtained from pt's  and son at bedside; pt's two sons live nearby and are available to assist PRN; pt's daughter is coming from [place] to stay at pt's home and provide care  Vision/Hearing  Vision  Vision: Impaired  Vision Exceptions: Wears glasses at all times  Hearing  Hearing: Exceptions to [facility] ( reports that pt is hard of hearing but has not been evaluated for hearing loss or use hearing aids)  Hearing Exceptions: Hard of hearing/hearing concerns    Cognition   Orientation  Overall Orientation Status: Impaired  Orientation Level: Unable to assess (Pt unable to provide verbal responses at this time; SHY orientation)  Cognition  Overall Cognitive Status: Exceptions  Arousal/Alertness: Delayed responses to stimuli;Inconsistent responses to stimuli  Following Commands: Follows one step commands with increased time; Follows one step commands with repetition; Inconsistently follows commands  Attention Span: Difficulty attending to directions; Attends with cues to redirect  Safety Judgement: Decreased awareness of need for assistance;Decreased awareness of need for safety  Initiation: Requires cues for all  Sequencing: Requires cues for all     Objective   Gross Assessment  Sensation: Intact     Joint Mobility  ROM RLE: WFL  ROM LLE: WFL  ROM RUE: See OT assessment- PT/ OT coeval  ROM LUE: See OT assessment- PT/ OT coeval  Strength RLE  Strength RLE: Exception  R Hip Flexion: 4/5  R Knee Flexion: 4/5  R Knee Extension: 4/5  R Ankle Dorsiflexion: 4/5  R Ankle Plantar flexion: 4/5  Strength LLE  Strength LLE: Exception  L Hip Flexion: 4/5  L Knee Flexion: 4/5  L Knee Extension: 4/5  L Ankle Dorsiflexion: 4/5  L Ankle Plantar Flexion: 4/5  Strength RUE  Comment: See OT assessment- PT/ OT coeval  Strength LUE  Comment: See OT assessment- PT/ OT coeval        Balance  Sitting: Impaired (Static at EOB ~5 minutes with Mod A for balance; unable to progress to dynamic sitting this date)  Standing:  (Unable to progress to standing this date)  Transfer Training  Transfer Training: No (Unable to progress to functional transfers this date)  Gait  Overall Level of Assistance:  (Unable to progress to functional mobility this date)  Bed mobility  Supine to Sit: Moderate assistance;2 Person assistance  Sit to Supine: Moderate assistance;2 Person assistance  Scooting:  Moderate assistance  Bed Mobility Comments: Increased time required to complete; pt required modA to sit EOB for ~2 minutes, pt demonstrating decreased responsiveness while EOB, able to nod yes when asked if pt was dizzy. /87. Pt safely returned to supine position with subsession of symptoms with return to supine position. Transfers  Comment: unable to perform due to poor sitting tolerance  Ambulation  Comments: unable to perform due to poor sitting tolerance  More Ambulation?: No  Stairs/Curb  Stairs?: No     Balance  Posture: Fair  Sitting - Static: Poor;+  Sitting - Dynamic: Poor;-  Comments: pt required modA to maintain seated balance while EOB; pt unable to attempt standing balance this date      AM-PAC Score  AM-PAC Inpatient Mobility Raw Score : 9 (08/26/22 1607)  AM-PAC Inpatient T-Scale Score : 30.55 (08/26/22 1607)  Mobility Inpatient CMS 0-100% Score: 81.38 (08/26/22 1607)  Mobility Inpatient CMS G-Code Modifier : CM (08/26/22 1607)    Goals  Short Term Goals  Time Frame for Short term goals: 14  Short term goal 1: Pt to perform bed mobility Nellie  Short term goal 2: Demonstrate functional transfers Nellie  Short term goal 3: Pt to ambulate 50ft w/ RW Nellie  Short term goal 4: Actively participate in 30 minutes of therapy to demo increased endurance  Patient Goals   Patient goals : Unable to state       Education  Patient Education  Education Given To: Patient; Family  Education Provided: Role of Therapy;Plan of Care;Transfer Training  Education Method: Demonstration;Verbal  Barriers to Learning: None  Education Outcome: Verbalized understanding; Unable to verbalize;Continued education needed      Therapy Time   Individual Concurrent Group Co-treatment   Time In 7657         Time Out 1513         Minutes 40         Timed Code Treatment Minutes: 9 Minutes       Lashon Patel, PT

## 2022-08-26 NOTE — PROGRESS NOTES
Trauma Tertiary Survey    Admit Date: 8/22/2022  Hospital day 4    [facility]       Past Medical History:   Diagnosis Date    A-fib [facility])     Biventricular congestive heart failure (HCC)     Bronchiectasis with acute exacerbation ([address] 04/29/2022    CAD (coronary artery disease)     Chronic pain syndrome     dilaudid infusion pump    COPD (chronic obstructive pulmonary disease) (HCC)     Depression     GERD (gastroesophageal reflux disease)     Hypothyroidism     Impaired fasting glucose     Iron deficiency anemia     Osteoporosis     Pulmonary fibrosis (HCC) 04/29/2022    Subdural hematoma (HCC) 08/23/2022       Scheduled Meds:   amLODIPine  10 mg Oral Daily    enoxaparin  30 mg SubCUTAneous BID    OLANZapine  5 mg Oral Nightly    levETIRAcetam  1,000 mg Oral BID    ipratropium-albuterol  3 mL Inhalation Q4H    sertraline  50 mg Oral Daily    metoclopramide  10 mg IntraVENous Q6H    lansoprazole  30 mg Per NG tube QAM AC    scopolamine  1 patch TransDERmal Q72H    levothyroxine  200 mcg Oral Daily    pregabalin  300 mg Oral BID    sodium chloride flush  5-40 mL IntraVENous 2 times per day    acetaminophen  650 mg Oral Q6H    sennosides-docusate sodium  1 tablet Oral BID    polyethylene glycol  17 g Oral Daily     Continuous Infusions:   sodium chloride      sodium chloride 55 mL/hr at 08/26/22 1538     PRN Meds:labetalol, sodium chloride flush, magnesium hydroxide, bisacodyl, sodium chloride, [DISCONTINUED] ondansetron **OR** ondansetron    Subjective:     Patient has no complaint of pain. There is not associated numbness, tingling, weakness.     Objective:   Patient Vitals for the past 8 hrs:   BP Pulse Resp SpO2   08/26/22 1500 129/62 75 16 94 %   08/26/22 1400 (!) 119/99 77 22 93 %   08/26/22 1300 (!) 130/53 84 19 98 %   08/26/22 1200 (!) 111/50 74 15 90 %   08/26/22 1100 (!) 137/56 72 18 92 %   08/26/22 1000 (!) 148/72 75 17 92 %   08/26/22 0909 (!) 152/60 -- -- --   08/26/22 0900 (!) 152/60 80 19 91 %       I/O last 3 completed shifts: In: 4739.8 [I.V.:3571.4; NG/GT:921; IV Piggyback:247.4]  Out: 8496 [Urine:4630; Drains:15]  I/O this shift: In: 741.8 [I.V.:612.8; NG/GT:129]  Out: 65 [Urine:455]    Radiology:  XR CHEST PORTABLE   Preliminary Result   1. Interval removal of the endotracheal tube. Enteric tube remains in place. 2.  Mildly increased patchy airspace opacity at the right base. Patchy   airspace opacities throughout the remainder of the lungs are otherwise   unchanged. XR CHEST PORTABLE   Final Result   1. Unchanged position of support devices. 2. No significant change in bilateral airspace disease. XR CHEST PORTABLE   Final Result   1. Endotracheal tube tip is 1.7 cm above the alejandro. Recommend retraction   by an additional 2 cm for optimal positioning. 2.  Patchy airspace opacities predominantly in the upper lobes. Consider   pneumonia in the appropriate clinical setting. CT Head wo Contrast   Final Result   1. Status post left hemispheric craniotomy with presumed evacuation of   subdural hematoma. Expected postsurgical changes of subdural and   subcutaneous tissue gas at the surgical site. Subdural blood has decreased   and there is lesser degree of left right mediastinal shift. XR ABDOMEN FOR NG/OG/NE TUBE PLACEMENT   Final Result   Slightly retracted NG tube, although it remains looped in the distal   esophagus. XR ABDOMEN FOR NG/OG/NE TUBE PLACEMENT   Final Result   Nasogastric tube is looped in the distal esophagus, with tip overlying the   distal stomach. XR ABDOMEN FOR NG/OG/NE TUBE PLACEMENT   Final Result   Nasogastric tube is looped in the distal esophagus, with tip overlying the   distal stomach. XR CHEST PORTABLE   Final Result   1. Placement endotracheal tube with the tip 4.1 cm above the alejandro. 2. Interstitial prominence consistent with fibrosis.   Patchy opacities of the   upper lung fields appear improved and

## 2022-08-26 NOTE — PROGRESS NOTES
ICU PROGRESS NOTE        PATIENT NAME: [facility] RECORD NO. [id number]  DATE: 2022    PRIMARY CARE PHYSICIAN: Pratima Canales MD    HD: # 4    ASSESSMENT    Patient Active Problem List   Diagnosis    Hypothyroidism    Major depressive disorder    Chronic midline low back pain without sciatica    Iron deficiency anemia    COPD (chronic obstructive pulmonary disease) (HCC)    Biventricular congestive heart failure (HCC)    Anemia    New onset a-fib (HCC)    Pulmonary fibrosis ([address]    Subdural hematoma ([address]    Fall as cause of accidental injury in home as place of occurrence, initial encounter    Midline shift of brain due to hematoma [facility])           MEDICAL DECISION MAKING AND PLAN  NEURO:   -Pain: controlled. MMT: tylenol 650 q6, lyrica 300mg BID   -Sedation: Zyprexa 5mg nightly  -L sided SDH with midline shift  -POD 3 from L craniotomy for evacuation of SDH  -Subgaleal drain removed yesterday  -On LTME - abnormal, nonspecific encephalopathy, inccreased risk for seizures due to L parieto-temporal sharps  -Keppra 1000 BID  -Zoloft 50mg  -SBP gaol 100-160    2. CV:  -SBPs: 120s-140s  -MAP: 70s-80s  -HR: 60s-70s  -Home meds: Norvasc 5mg  -Labetelol 10mg prn for SBP >140  -Required hydralizine X1 overnight     3. HEME:   -Hgb: 12 (10.5)   -Platelets: 643   -INR: 1.0    4.   RESP:   -Extubated    -On 3L NC   -Duonebs q4   -Hx of COPD  -IS: encourage    5. GI  -Diet: TF, increased to goal  -Bowel regimen: glycolax, senokot, dulcolax suppository prn, milk of mag prn    6. RENAL   -UOP:  2 mL/kg/hr   -IVF: NS @ 0-110mL/hr   -Net: +2.3L   -BUN/Cr: 8/0.33   -Na/K: 140/3.8   -Mg/P: 2.0/3.3   -iCa: 8.5    7. MSK:    -Weight bearing status: no restrictions   -PT/OT ordered   8. ID  -Tmax: 37.8  -WBC: 11.7   -Abx: none    9. ENDO   -B  -Insulin: controlled  -Synthroid 200mcg daily    10. Lines  - 2 PIV  -Sheikh - removed  -NG    11.    PPX:  -DVT: lovenox 30 BID  -GI: lansoprazole 30 daily, reglan    12. CONSULTS   -Neurosurgery: SDH, craniotomy    13. DISPO:    -Extubated yesterday   -Transfer to step down      CHECKLIST    CAM-ICU RASS: +1  RESTRAINTS: None  IVF: Gentle  NUTRITION: TF  ANTIBIOTICS: none  GI: prophylaxis  DVT: lovenox  GLYCEMIC CONTROL: controlled  HOB >45: yes  MOBILITY: PT/OT  SBT: not indicated  IS: encourage    Chief Complaint: \"None\"    [address]  presented after a fall on xarelto. Sustained SDH, has gone for evacuation with NS. Extubated yesterday. On NC. Following commands. No complaints.       OBJECTIVE  VITALS: Temp: Temp: 99.1 °F (37.3 °C)Temp  Av.1 °F (37.3 °C)  Min: 98 °F (36.7 °C)  Max: 100 °F (46.5 °C) BP Systolic (51JOL), TQP:260 , Min:111 , FSC:311   Diastolic (55DIX), HTI:74, Min:50, Max:100   Pulse Pulse  Av.3  Min: 65  Max: 84 Resp Resp  Av.4  Min: 14  Max: 22 Pulse ox SpO2  Av.2 %  Min: 90 %  Max: 100 %    GENERAL: alert, cooperative, no distress  NEURO: strength 5/5 in bilateral upper and lower extremities, sensation intact, difficulty with speech  HEENT: positive findings:  brusing around L eye, pupils PERRLA , maria g in place to scalp, do surrounding redness, no drainage, NG tube in place  : deferred  LUNGS: clear to auscultation bilaterally- no wheezes, rales or rhonchi, normal air movement, no respiratory distress, no chest wall tenderness, and clear to ausculation, without wheezes, rales or rhonci  HEART: normal rate and regular rhythm  ABDOMEN: soft, non-tender, non-distended, bowel sounds present in all 4 quadrants, and no guarding or peritoneal signs present  EXTERMITY: no cyanosis, clubbing or edema      Drain/tube output:  none    LAB:  CBC:   Recent Labs     22  0506 22  0452 22  0240   WBC 11.3 7.4 11.7*   HGB 12.6 10.5* 12.0   HCT 36.7 31.2* 36.3   MCV 95.3 96.6 97.1    145 183     BMP:   Recent Labs     22  0452 22  1438 22  0240    139 140   K 3.5* 3.9 3.8    106 105   CO2 24 28 25   BUN 10 8 8   CREATININE 0.35* 0.31* 0.33*   GLUCOSE 115* 108* 129*         RADIOLOGY:  CXR:   1. Interval removal of the endotracheal tube. Enteric tube remains in place. 2.  Mildly increased patchy airspace opacity at the right base. Patchy   airspace opacities throughout the remainder of the lungs are otherwise   unchanged.          Libia Done, DO  8/26/22, 2:56 PM

## 2022-08-26 NOTE — PROGRESS NOTES
Occupational Therapy  Facility/Department: [facility] CAR 1- [facility]  Occupational Therapy Initial Assessment    Name: Amber Kwon  : 1945  MRN: [id number]  Date of Service: 2022  Obtained from medical chart:   \" The patient is a 69 y.o. female that presented to ED after experiencing a fall. Patient stood up from a standing position and subsequently fell forward, striking her head in a forward fall. Denies LOC. \"    Discharge Recommendations:   Further therapy recommended at discharge. The patient should be able to tolerate at least 3 hours of therapy per day over 5 days or 15 hours over 7 days. This patient may benefit from a Physical Medicine and Rehab consult. OT Equipment Recommendations  Equipment Needed: Yes  Mobility Devices: ADL Assistive Devices  ADL Assistive Devices: Reacher;Long-handled Sponge       Patient Diagnosis(es): The encounter diagnosis was Subdural hematoma ([address] Past Medical History:  has a past medical history of A-fib ([address] Biventricular congestive heart failure ([address] Bronchiectasis with acute exacerbation ([address] CAD (coronary artery disease), Chronic pain syndrome, COPD (chronic obstructive pulmonary disease) ([address] Depression, GERD (gastroesophageal reflux disease), Hypothyroidism, Impaired fasting glucose, Iron deficiency anemia, Osteoporosis, Pulmonary fibrosis ([address] and Subdural hematoma ([address] Past Surgical History:  has a past surgical history that includes Hysterectomy (1991); Carpal tunnel release (Right, 1999); Total knee arthroplasty (Right, 2021); fracture surgery (Left, 2018); Wrist fracture surgery (Left, 2018); lumbar discectomy (1993); Lumbar disc surgery (02/15/1994); back surgery (1998); back surgery (1999); Carpal tunnel release (Left, 1999); Neck surgery (2002); Carpal tunnel release (Right, 2002);  Carpal tunnel release (Left, 2003); back surgery (10/23/2003); back surgery (10/23/2003); Cervical disc surgery (10/25/2004); Cervical disc surgery (12/22/2004); Neck surgery (12/09/2005); Toe amputation (10/08/2006); Toe amputation (03/07/2008); lumbar laminectomy (06/09/2008); Toe amputation (10/03/2008); Humerus fracture surgery (Right, 10/03/2010); Knee arthroscopy (Right, 06/02/2011); back surgery (09/11/2017); knee surgery (Right, 02/04/2016); Wrist fracture surgery (Left, 12/20/2018); Wrist surgery (Left, 07/02/2019); and craniotomy (Left, 8/23/2022). Assessment   Performance deficits / Impairments: Decreased functional mobility ; Decreased safe awareness;Decreased cognition;Decreased ADL status; Decreased ROM; Decreased strength;Decreased endurance;Decreased balance;Decreased high-level IADLs;Decreased fine motor control;Decreased coordination  Assessment: Patient lethargic this date with difficulty maintaining arousal during session, frequently closing eyes during functional tasks. Patient demo'd difficulty following commands, requiring increased time and effort to complete tasks. Patient unable to respond to questions verbally this date with difficulty expressing needs. Patient completed bed mobility with Mod Ax2 for trunk and LE progression with VC for sequencing and initiation. Patient demo'd decreased sitting balance this date, requiring Mod A for correction of posterior lean; patient nodded head when asked if dizzy. SBP WFL while sitting at EOB. Patient demo'd decreased strength in R UE, utilizing L UE for suctioning; pt's family reports that pt is R hand dominant. Patient would benefit from continued acute OT services to increase safety and independence with ADLs/IADLs.   Prognosis: Good  Decision Making: High Complexity  REQUIRES OT FOLLOW-UP: Yes  Activity Tolerance  Activity Tolerance: Patient limited by fatigue;Treatment limited secondary to decreased cognition  Activity Tolerance Comments: Patient lethargic throughout session with difficulty maintaining arousal to follow commands; patient unable to verbalize needs this date but nodded head when asked if dizzy while sitting at EOB and agreeable to laying back in bed        Plan   Plan  Times per Week: 4-5 x/wk  Current Treatment Recommendations: Strengthening, ROM, Balance training, Functional mobility training, Endurance training, Safety education & training, Patient/Caregiver education & training, Equipment evaluation, education, & procurement, Self-Care / ADL, Home management training, Cognitive/Perceptual training, Coordination training     Restrictions  Restrictions/Precautions  Restrictions/Precautions: Fall Risk  Required Braces or Orthoses?: No  Position Activity Restriction  Other position/activity restrictions: Up with assist, -140, s/p L craniotomy for SDH evacuation; HOB >30 degrees    Subjective   General  Patient assessed for rehabilitation services?: Yes  Family / Caregiver Present: Yes ( and children)  General Comment  Comments: RN ok'd patient for OT eval this date. Patient pleasant, agreeable, and cooperative throughout. Patient reported pain at beginning of session but unable to provide numerical rating.      Social/Functional History  Social/Functional History  Lives With: Spouse (Dog)  Type of Home: House  Home Layout: Two level, Able to Live on Main level with bedroom/bathroom  Home Access: Stairs to enter without rails  Entrance Stairs - Number of Steps: 1+1 SAUNDRA through garage, typically use garage entry  Bathroom Shower/Tub: Tub/Shower unit  [employer]: Standard  Bathroom Equipment: Grab bars in shower, Commode, Shower chair  Home Equipment: Criselda Lyme, quad, Walker, rolling (transport chair; typically uses cane for functional mobility)  Receives Help From: Family  ADL Assistance: [address]: Independent  Homemaking Responsibilities: Yes ( cleans and grocery shops, is able to complete all household management tasks)  Laundry Responsibility: Primary  Cleaning Responsibility: Primary  Shopping Responsibility: Secondary  Ambulation Assistance: Independent (Pt's  reports that pt has difficulty with functional mobility long distances outside the home)  Transfer Assistance: Independent  Active : No  Patient's  Info:  drives PRN; children in area available to drive longer distances  Mode of Transportation: SUV  Occupation: Retired  Type of Occupation: 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; 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  Leisure & Hobbies: Going camping  Additional Comments: Social functional information obtained from pt's  and son at bedside; pt's two sons live nearby and are available to assist PRN; pt's daughter is coming from [place] to stay at pt's home and provide care       Objective   SpO2: 94 %  O2 Device: Nasal cannula (5L)             Safety Devices  Type of Devices: Nurse notified;Call light within reach; Bed alarm in place; Left in bed  Restraints  Restraints Initially in Place: No  Balance  Sitting: Impaired (Static at EOB ~5 minutes with Mod A for balance; unable to progress to dynamic sitting this date)  Standing:  (Unable to progress to standing this date)  Transfer Training  Transfer Training: No (Unable to progress to functional transfers this date)  Gait  Overall Level of Assistance:  (Unable to progress to functional mobility this date)     AROM: Generally decreased, functional (R shoulder flex WFL; L shoulder flex ~90 degrees; R elbow flex ~120 degrees; L elbow flex WFL)  PROM: Generally decreased, functional (L shoulder ~160 with facial grimmacing)  Strength: Generally decreased, functional (L shoulder flex 3-/5; R shoulder flex 3+/5; L elbow flex/ext 4+/5; R elbow flex/ext 3+/5; L  4+/5; R  3+/5)  Coordination: Generally decreased, functional (L digit opposition delayed, only completed digit 2 to thumb opposition with difficulty completing opposition with other digits; R digit opposition NT d/t patient having difficulty following commands; observed deficits with coordination)  Tone: Normal  Sensation:  (SHY)  ADL  Feeding: Minimal assistance;Verbal cueing;Setup; Increased time to complete  Feeding Skilled Clinical Factors: Patient observed to bring suction tube to mouth with Min A for coordination, requiring increased time to complete; expected to require Min A for feeding tasks  Grooming: Minimal assistance;Setup;Verbal cueing; Increased time to complete  UE Bathing: Maximum assistance;Setup;Verbal cueing; Increased time to complete  LE Bathing: Setup;Verbal cueing; Increased time to complete;Maximum assistance  UE Dressing: Maximum assistance; Increased time to complete;Setup;Verbal cueing  LE Dressing: Maximum assistance; Increased time to complete;Setup;Verbal cueing  Toileting: Maximum assistance;Setup; Increased time to complete; Adaptive equipment     Activity Tolerance  Activity Tolerance: Patient limited by fatigue;Patient limited by endurance;Treatment limited secondary to decreased cognition  Bed mobility  Supine to Sit: Moderate assistance;2 Person assistance (For trunk and LE progression)  Sit to Supine: Moderate assistance;2 Person assistance (For trunk and LE progression)  Bed Mobility Comments: Increased time and effort to complete with VC for initiation and sequencing     Vision  Vision: Impaired  Vision Exceptions: Wears glasses at all times  Hearing  Hearing: Exceptions to [facility] ( reports that pt is hard of hearing but has not been evaluated for hearing loss or use hearing aids)  Hearing Exceptions: Hard of hearing/hearing concerns  Cognition  Overall Cognitive Status: Exceptions  Arousal/Alertness: Delayed responses to stimuli;Inconsistent responses to stimuli  Following Commands: Follows one step commands with increased time; Follows one step commands with repetition; Inconsistently follows commands  Attention Span: Difficulty attending to directions; Attends with cues to redirect  Safety Judgement:

## 2022-08-26 NOTE — PROGRESS NOTES
Comprehensive Nutrition Assessment    Type and Reason for Visit:  Reassess    Nutrition Recommendations/Plan:   Continue current TF, Immune Enhancing at 25 mL/hr to provide 900 kcal, 56 g pro. Recommend goal rate of 45 mL/hr to provide 1620 kcal, 101 g pro. Monitor TF tolerance/adequacy  Monitor labs, wt, plan of care     Malnutrition Assessment:  Malnutrition Status:  Insufficient data (08/24/22 1340)    Context:  Acute Illness       Nutrition Assessment:    Chart reviewed. Pt extuabted 8/25. Started on Immune Enhancing TF at 25 mL/hr (8/24/22), RN reports pt tolerating well. Labs reviewed. Meds reviewed. Nutrition Related Findings:    labs/meds reviewed Wound Type: Surgical Incision (head)       Current Nutrition Intake & Therapies:    Average Meal Intake: NPO  Average Supplements Intake: NPO  Current Tube Feeding (TF) Orders:  Feeding Route: Nasogastric  Formula: Immune Enhancing  Schedule: Continuous  Feeding Regimen: 25 mL/hr to start today  Additives/Modulars:    Water Flushes: 30 mL every 4 hr  Current TF & Flush Orders Provides: 25 mL/hr =900 kcal and 56 g pro/day  Goal TF & Flush Orders Provides: 45 mL/hr =1620 kcal and 101 g pro/day    Anthropometric Measures:  Height: 5' 5\" (165.1 cm)  Ideal Body Weight (IBW): 125 lbs (57 kg)    Admission Body Weight: 153 lb 14.1 oz (69.8 kg)  Current Body Weight: 166 lb 10.7 oz (75.6 kg), 123.1 % IBW. Weight Source: Bed Scale  Current BMI (kg/m2): 27.7  Usual Body Weight: 160 lb (72.6 kg) (5/2/22)  % Weight Change (Calculated): -3.8                    BMI Categories: Overweight (BMI 25.0-29. 9)    Estimated Daily Nutrient Needs:  Energy Requirements Based On: Kcal/kg  Weight Used for Energy Requirements: Admission  Energy (kcal/day): 1700 kcal/day  Weight Used for Protein Requirements: Current  Protein (g/day): 85 g pro/day  Method Used for Fluid Requirements: ml/Kg (28)  Fluid (ml/day): 2000 mL/day or per MD    Nutrition Diagnosis:   Inadequate oral intake related to acute injury/trauma as evidenced by NPO or clear liquid status due to medical condition, nutrition support - enteral nutrition    Nutrition Interventions:   Food and/or Nutrient Delivery: Continue Current Tube Feeding  Nutrition Education/Counseling: No recommendation at this time  Coordination of Nutrition Care: Continue to monitor while inpatient       Goals:  Previous Goal Met: Progressing toward Goal(s)  Goals: Meet at least 75% of estimated needs, within 7 days       Nutrition Monitoring and Evaluation:   Behavioral-Environmental Outcomes: None Identified  Food/Nutrient Intake Outcomes: Enteral Nutrition Intake/Tolerance  Physical Signs/Symptoms Outcomes: Biochemical Data, Nutrition Focused Physical Findings, Skin, Weight    Discharge Planning:     Too soon to determine     Fredis Ngo, [address]  Contact: [phone]

## 2022-08-27 LAB
ABSOLUTE EOS #: 0.1 K/UL (ref 0–0.44)
ABSOLUTE IMMATURE GRANULOCYTE: <0.03 K/UL (ref 0–0.3)
ABSOLUTE LYMPH #: 0.95 K/UL (ref 1.1–3.7)
ABSOLUTE MONO #: 0.95 K/UL (ref 0.1–1.2)
ANION GAP SERPL CALCULATED.3IONS-SCNC: 9 MMOL/L (ref 9–17)
BASOPHILS # BLD: 0 % (ref 0–2)
BASOPHILS ABSOLUTE: 0.03 K/UL (ref 0–0.2)
BUN BLDV-MCNC: 10 MG/DL (ref 8–23)
CALCIUM SERPL-MCNC: 8.7 MG/DL (ref 8.6–10.4)
CHLORIDE BLD-SCNC: 106 MMOL/L (ref 98–107)
CO2: 26 MMOL/L (ref 20–31)
CREAT SERPL-MCNC: 0.28 MG/DL (ref 0.5–0.9)
EOSINOPHILS RELATIVE PERCENT: 1 % (ref 1–4)
GFR AFRICAN AMERICAN: >60 ML/MIN
GFR NON-AFRICAN AMERICAN: >60 ML/MIN
GFR SERPL CREATININE-BSD FRML MDRD: ABNORMAL ML/MIN/{1.73_M2}
GLUCOSE BLD-MCNC: 108 MG/DL (ref 70–99)
HCT VFR BLD CALC: 31.9 % (ref 36.3–47.1)
HEMOGLOBIN: 10.8 G/DL (ref 11.9–15.1)
IMMATURE GRANULOCYTES: 0 %
LYMPHOCYTES # BLD: 13 % (ref 24–43)
MAGNESIUM: 2 MG/DL (ref 1.6–2.6)
MCH RBC QN AUTO: 32.5 PG (ref 25.2–33.5)
MCHC RBC AUTO-ENTMCNC: 33.9 G/DL (ref 28.4–34.8)
MCV RBC AUTO: 96.1 FL (ref 82.6–102.9)
MONOCYTES # BLD: 13 % (ref 3–12)
NRBC AUTOMATED: 0 PER 100 WBC
PDW BLD-RTO: 14.3 % (ref 11.8–14.4)
PLATELET # BLD: 196 K/UL (ref 138–453)
PMV BLD AUTO: 9.9 FL (ref 8.1–13.5)
POTASSIUM SERPL-SCNC: 3.1 MMOL/L (ref 3.7–5.3)
RBC # BLD: 3.32 M/UL (ref 3.95–5.11)
SEG NEUTROPHILS: 73 % (ref 36–65)
SEGMENTED NEUTROPHILS ABSOLUTE COUNT: 5.52 K/UL (ref 1.5–8.1)
SODIUM BLD-SCNC: 141 MMOL/L (ref 135–144)
WBC # BLD: 7.6 K/UL (ref 3.5–11.3)

## 2022-08-27 PROCEDURE — 6360000002 HC RX W HCPCS: Performed by: NURSE PRACTITIONER

## 2022-08-27 PROCEDURE — 94640 AIRWAY INHALATION TREATMENT: CPT

## 2022-08-27 PROCEDURE — 6370000000 HC RX 637 (ALT 250 FOR IP): Performed by: REGISTERED NURSE

## 2022-08-27 PROCEDURE — APPSS30 APP SPLIT SHARED TIME 16-30 MINUTES: Performed by: REGISTERED NURSE

## 2022-08-27 PROCEDURE — 92523 SPEECH SOUND LANG COMPREHEN: CPT

## 2022-08-27 PROCEDURE — 83735 ASSAY OF MAGNESIUM: CPT

## 2022-08-27 PROCEDURE — 2700000000 HC OXYGEN THERAPY PER DAY

## 2022-08-27 PROCEDURE — 6370000000 HC RX 637 (ALT 250 FOR IP): Performed by: PHYSICIAN ASSISTANT

## 2022-08-27 PROCEDURE — 94761 N-INVAS EAR/PLS OXIMETRY MLT: CPT

## 2022-08-27 PROCEDURE — 2060000000 HC ICU INTERMEDIATE R&B

## 2022-08-27 PROCEDURE — 6370000000 HC RX 637 (ALT 250 FOR IP): Performed by: EMERGENCY MEDICINE

## 2022-08-27 PROCEDURE — 6370000000 HC RX 637 (ALT 250 FOR IP): Performed by: STUDENT IN AN ORGANIZED HEALTH CARE EDUCATION/TRAINING PROGRAM

## 2022-08-27 PROCEDURE — 51701 INSERT BLADDER CATHETER: CPT

## 2022-08-27 PROCEDURE — 80048 BASIC METABOLIC PNL TOTAL CA: CPT

## 2022-08-27 PROCEDURE — 2500000003 HC RX 250 WO HCPCS

## 2022-08-27 PROCEDURE — 51798 US URINE CAPACITY MEASURE: CPT

## 2022-08-27 PROCEDURE — 6360000002 HC RX W HCPCS: Performed by: STUDENT IN AN ORGANIZED HEALTH CARE EDUCATION/TRAINING PROGRAM

## 2022-08-27 PROCEDURE — 82947 ASSAY GLUCOSE BLOOD QUANT: CPT

## 2022-08-27 PROCEDURE — 6370000000 HC RX 637 (ALT 250 FOR IP): Performed by: NURSE PRACTITIONER

## 2022-08-27 PROCEDURE — 85025 COMPLETE CBC W/AUTO DIFF WBC: CPT

## 2022-08-27 PROCEDURE — 36415 COLL VENOUS BLD VENIPUNCTURE: CPT

## 2022-08-27 PROCEDURE — 6360000002 HC RX W HCPCS: Performed by: PHYSICIAN ASSISTANT

## 2022-08-27 PROCEDURE — 2580000003 HC RX 258: Performed by: PHYSICIAN ASSISTANT

## 2022-08-27 RX ORDER — OXYCODONE HYDROCHLORIDE 5 MG/1
2.5 TABLET ORAL ONCE
Status: COMPLETED | OUTPATIENT
Start: 2022-08-27 | End: 2022-08-27

## 2022-08-27 RX ADMIN — Medication 10 MG: at 06:48

## 2022-08-27 RX ADMIN — SODIUM CHLORIDE, PRESERVATIVE FREE 10 ML: 5 INJECTION INTRAVENOUS at 21:00

## 2022-08-27 RX ADMIN — ENOXAPARIN SODIUM 30 MG: 100 INJECTION SUBCUTANEOUS at 21:38

## 2022-08-27 RX ADMIN — POLYETHYLENE GLYCOL 3350 17 G: 17 POWDER, FOR SOLUTION ORAL at 09:30

## 2022-08-27 RX ADMIN — ENOXAPARIN SODIUM 30 MG: 100 INJECTION SUBCUTANEOUS at 09:23

## 2022-08-27 RX ADMIN — IPRATROPIUM BROMIDE AND ALBUTEROL SULFATE 3 ML: .5; 3 SOLUTION RESPIRATORY (INHALATION) at 01:00

## 2022-08-27 RX ADMIN — IPRATROPIUM BROMIDE AND ALBUTEROL SULFATE 3 ML: .5; 3 SOLUTION RESPIRATORY (INHALATION) at 04:23

## 2022-08-27 RX ADMIN — ACETAMINOPHEN 650 MG: 325 TABLET ORAL at 21:40

## 2022-08-27 RX ADMIN — STANDARDIZED SENNA CONCENTRATE AND DOCUSATE SODIUM 1 TABLET: 8.6; 5 TABLET ORAL at 11:47

## 2022-08-27 RX ADMIN — METOCLOPRAMIDE 10 MG: 5 INJECTION, SOLUTION INTRAMUSCULAR; INTRAVENOUS at 04:53

## 2022-08-27 RX ADMIN — ACETAMINOPHEN 650 MG: 325 TABLET ORAL at 04:53

## 2022-08-27 RX ADMIN — ACETAMINOPHEN 650 MG: 325 TABLET ORAL at 11:48

## 2022-08-27 RX ADMIN — IPRATROPIUM BROMIDE AND ALBUTEROL SULFATE 3 ML: .5; 3 SOLUTION RESPIRATORY (INHALATION) at 15:12

## 2022-08-27 RX ADMIN — PREGABALIN 300 MG: 100 CAPSULE ORAL at 21:39

## 2022-08-27 RX ADMIN — IPRATROPIUM BROMIDE AND ALBUTEROL SULFATE 3 ML: .5; 3 SOLUTION RESPIRATORY (INHALATION) at 07:36

## 2022-08-27 RX ADMIN — LEVETIRACETAM 1000 MG: 500 SOLUTION ORAL at 21:38

## 2022-08-27 RX ADMIN — PREGABALIN 300 MG: 100 CAPSULE ORAL at 11:47

## 2022-08-27 RX ADMIN — OXYCODONE HYDROCHLORIDE 2.5 MG: 5 TABLET ORAL at 09:29

## 2022-08-27 RX ADMIN — AMLODIPINE BESYLATE 10 MG: 5 TABLET ORAL at 09:23

## 2022-08-27 RX ADMIN — Medication 10 MG: at 20:10

## 2022-08-27 RX ADMIN — POTASSIUM BICARBONATE 40 MEQ: 782 TABLET, EFFERVESCENT ORAL at 11:47

## 2022-08-27 RX ADMIN — STANDARDIZED SENNA CONCENTRATE AND DOCUSATE SODIUM 1 TABLET: 8.6; 5 TABLET ORAL at 21:39

## 2022-08-27 RX ADMIN — LEVETIRACETAM 1000 MG: 500 SOLUTION ORAL at 11:48

## 2022-08-27 RX ADMIN — LEVOTHYROXINE SODIUM 200 MCG: 125 TABLET ORAL at 09:23

## 2022-08-27 RX ADMIN — SODIUM CHLORIDE, PRESERVATIVE FREE 10 ML: 5 INJECTION INTRAVENOUS at 11:50

## 2022-08-27 RX ADMIN — OLANZAPINE 5 MG: 5 TABLET, FILM COATED ORAL at 21:39

## 2022-08-27 RX ADMIN — IPRATROPIUM BROMIDE AND ALBUTEROL SULFATE 3 ML: .5; 3 SOLUTION RESPIRATORY (INHALATION) at 11:28

## 2022-08-27 RX ADMIN — ONDANSETRON 4 MG: 2 INJECTION INTRAMUSCULAR; INTRAVENOUS at 12:00

## 2022-08-27 RX ADMIN — IPRATROPIUM BROMIDE AND ALBUTEROL SULFATE 3 ML: .5; 3 SOLUTION RESPIRATORY (INHALATION) at 21:19

## 2022-08-27 RX ADMIN — OXYCODONE HYDROCHLORIDE 2.5 MG: 5 TABLET ORAL at 23:36

## 2022-08-27 ASSESSMENT — PAIN DESCRIPTION - DESCRIPTORS
DESCRIPTORS: ACHING;DULL;DISCOMFORT
DESCRIPTORS: ACHING;DISCOMFORT;DULL

## 2022-08-27 ASSESSMENT — PAIN DESCRIPTION - LOCATION
LOCATION: HEAD

## 2022-08-27 ASSESSMENT — PAIN SCALES - GENERAL
PAINLEVEL_OUTOF10: 10
PAINLEVEL_OUTOF10: 3
PAINLEVEL_OUTOF10: 10
PAINLEVEL_OUTOF10: 3
PAINLEVEL_OUTOF10: 10

## 2022-08-27 ASSESSMENT — PAIN DESCRIPTION - ORIENTATION
ORIENTATION: RIGHT;LEFT;INNER
ORIENTATION: LEFT;ANTERIOR

## 2022-08-27 NOTE — PROGRESS NOTES
Neurosurgery LAUREN/Resident    Daily Progress Note   Chief Complaint   Patient presents with    Trauma     8/27/2022  8:05 AM    Chart reviewed. No acute events overnight. No new complaints. Vitals:    08/27/22 0500 08/27/22 0640 08/27/22 0734 08/27/22 0744   BP: 126/63 (!) 155/68 138/70    Pulse:  80  64   Resp:  18  18   Temp:  100.1 °F (37.8 °C) 98.2 °F (36.8 °C)    TempSrc:  Axillary Axillary    SpO2:  99%  94%   Weight: 155 lb 10.3 oz (70.6 kg)      Height:             PE:   Alert, dysarthric but able to state name and hospital  Follow all commands  Motor   Moving all extremities   Antigravity to bilat UE and LE     Incision cranial site open to air, dry and clean      Lab Results   Component Value Date    WBC 7.6 08/27/2022    HGB 10.8 (L) 08/27/2022    HCT 31.9 (L) 08/27/2022     08/27/2022    CHOL 251 (H) 06/20/2022    TRIG 84 08/25/2022    HDL 77 06/20/2022    ALT 11 06/20/2022    AST 20 06/20/2022     08/27/2022    K PENDING 08/27/2022     08/27/2022    CREATININE 0.28 (L) 08/27/2022    BUN 10 08/27/2022    CO2 26 08/27/2022    TSH 11.11 (H) 06/20/2022    INR 1.0 08/24/2022    LABA1C 5.7 06/20/2022    CRP 34.7 (H) 01/18/2022    SEDRATE 5 07/30/2020         A/P  left sided SDH  POD#4 s/p left craniotomy for evacuation of SDH    - PT, OT and SLP, activity as tolerated    - continue HOB >30  - continue SBP goal 100-160  - SCDs for dvt ppx and lovenox   - neuro checks    Please contact neurosurgery with any changes in patients neurologic status.        Chantell Dick, CNP  8/27/22  8:05 AM

## 2022-08-27 NOTE — PROGRESS NOTES
PROGRESS NOTE          PATIENT NAME: Janet [facility] RECORD NO. [id number]  DATE: 8/27/2022  SURGEON: Grant Gomez  PRIMARY CARE PHYSICIAN: Wilber Luna MD    HD: # 5    ASSESSMENT    Patient Active Problem List   Diagnosis    Hypothyroidism    Major depressive disorder    Chronic midline low back pain without sciatica    Iron deficiency anemia    COPD (chronic obstructive pulmonary disease) (HCC)    Biventricular congestive heart failure (HCC)    Anemia    New onset a-fib (HCC)    Pulmonary fibrosis ([address]    [address]dural hematoma [address]    Fall as cause of accidental injury in home as place of occurrence, initial encounter    Midline shift of brain due to hematoma [facility])       MEDICAL DECISION MAKING AND PLAN    NEURO:   -Pain: controlled. MMT: tylenol 650 q6, lyrica 300mg BID   -Sedation: Zyprexa 5mg nightly  -L sided SDH with midline shift  -POD 4 from L craniotomy for evacuation of SDH  -Subgaleal drain removed   -LTME completed, showed moderate nonspecific encephalopathy with presence of left parietotemporal sharps increase. Also shows  interhemispheric asymmetry suggesting breach rhythm and underlying dysfunction on the left hemisphere. No clear arrhythmia. -Keppra 1000 BID  -Zoloft 50mg  -SBP gaol 100-160  -NS following     2. CV:  -SBPs: 120s-140s  -MAP: 70s-80s  -HR: 60s-70s  -Home meds: Norvasc 10mg  -Labetelol 10mg prn for SBP >140     3. HEME:              -Hgb: 10.8 (12, 10.5)              -Platelets: 087     4. RESP:              -Extubated 8/25              -On 3L NC, highflow at night              -Duonebs              -Hx of COPD  -IS: encourage     5. GI  -Diet: TF, increased to goal  -Bowel regimen: glycolax, senokot, dulcolax suppository prn, milk of mag prn     6. RENAL              -UOP:  0.8 mL/kg/hr              -IVF: NS @ 0-100mL/hr              -Net: +3.3L              -BUN/Cr: 10/0.28              -Na/K: 141/3.1     7.    MSK:               -Weight bearing status: no restrictions              -PT/OT ordered   8. ID  -Tmax: 38.4  -WBC: 7.6 (11.7)   -Abx: none     9. ENDO              -B  -Insulin: controlled  -Synthroid 200mcg daily     10. Lines  - 2 PIV  -NG     11. PPX:  -DVT: lovenox 30 BID  -GI: PPI discontinued     12. CONSULTS              -Neurosurgery: SDH, craniotomy     13. DISPO:               -Remain in stepdown   -Consider SNF on discharge      Chief Complaint: \"Headache\"    Fili Hernandez is has improved since yesterday. This morning she is complaining of a mild headache. Was transferred out of the ICU overnight. Currently doing well. Still having NG tube feeds. She is suctioning out her mouth herself. Voiding since Sheikh has been removed. Currently postop day 4 from left-sided craniotomy due to evacuation of subdural hematoma. Neurosurgery continuing to follow. Was extubated a few days ago, currently stable on nasal cannula oxygen during the day and high flow at night. Most likely will require placement to nursing facility. OBJECTIVE  VITALS: Temp: Temp: 98.2 °F (36.8 °C)Temp  Av.3 °F (37.4 °C)  Min: 98.2 °F (36.8 °C)  Max: 101.2 °F (62.7 °C) BP Systolic (23XIU), ORN:985 , Min:100 , URV:269   Diastolic (57IQA), TBS:70, Min:48, Max:125   Pulse Pulse  Av.4  Min: 64  Max: 99 Resp Resp  Av.4  Min: 6  Max: 22 Pulse ox SpO2  Av.1 %  Min: 89 %  Max: 100 %  GENERAL: alert, no distress  NEURO: Strength 5/5 in bilateral lower extremities. Slightly diminished  strength on the right, unchanged from yesterday. Sensation intact. Follows commands. Does not speak much. HEENT: Staples present in the head. Bruising noted around left eye.   : deferred  LUNGS:  clear to ausculation, without wheezes, rales or rhonci  HEART: normal rate and regular rhythm  ABDOMEN: soft, non-tender, non-distended, and no guarding or peritoneal signs present  EXTREMITY: no cyanosis, clubbing or edema    I/O last 3 completed shifts: In: 3725.5 [I.V.:2577.5; NG/GT:1148]  Out: 5664 [Urine:2640]    Drain/tube output:  In: 2372.6 [I.V.:1593.6; NG/GT:779]  Out: 1415 [WTDNR:0974]    LAB:  CBC:   Recent Labs     08/25/22  0452 08/26/22  0240 08/27/22  0316   WBC 7.4 11.7* 7.6   HGB 10.5* 12.0 10.8*   HCT 31.2* 36.3 31.9*   MCV 96.6 97.1 96.1    183 196     BMP:   Recent Labs     08/25/22  1438 08/26/22  0240 08/27/22  0316    140 141   K 3.9 3.8 3.1*    105 106   CO2 28 25 26   BUN 8 8 10   CREATININE 0.31* 0.33* 0.28*   GLUCOSE 108* 129* 108*     COAGS: No results for input(s): APTT, PROT, INR in the last 72 hours.     RADIOLOGY:  No new imaging      Corrinne Daub, DO  8/27/22, 10:12 AM

## 2022-08-27 NOTE — PROGRESS NOTES
[facility]  Speech Language Pathology    Date: 8/27/2022  Patient Name: Toña Radford  YOB: 1945   AGE: 69 y.o. MRN: [id number]        Patient Not Available for Speech Therapy     Due to:  [] Testing  [] Hemodialysis  [] Cancelled by RN  [] Surgery   [] Intubation/Sedation/Pain Medication  [] Medical instability  [x] Other:working with other therapies. Will re-attempt as able.      Next scheduled treatment:   Completed by:   Lexy Arango CCC-SLP   Speech-Language Pathologist

## 2022-08-27 NOTE — PROGRESS NOTES
PROGRESS NOTE          PATIENT NAME: Janet [facility] RECORD NO. [id number]  DATE: 8/27/2022  SURGEON: Bright Avery  PRIMARY CARE PHYSICIAN: Sanket Hernández MD    HD: # 5    ASSESSMENT    Patient Active Problem List   Diagnosis    Hypothyroidism    Major depressive disorder    Chronic midline low back pain without sciatica    Iron deficiency anemia    COPD (chronic obstructive pulmonary disease) (HCC)    Biventricular congestive heart failure (HCC)    Anemia    New onset a-fib (HCC)    Pulmonary fibrosis ([address]    Subdural hematoma ([facility]    Fall as cause of accidental injury in home as place of occurrence, initial encounter    Midline shift of brain due to hematoma [facility])       MEDICAL DECISION MAKING AND PLAN    NEURO:   -Pain: controlled. MMT: tylenol 650 q6, lyrica 300mg BID   -Sedation: Zyprexa 5mg nightly  -L sided SDH with midline shift  -POD 4 from L craniotomy for evacuation of SDH  -Subgaleal drain removed   -LTME completed, showed moderate nonspecific encephalopathy with presence of left parietotemporal sharps increase. Also shows  interhemispheric asymmetry suggesting breach rhythm and underlying dysfunction on the left hemisphere. No clear arrhythmia. -Keppra 1000 BID  -Zoloft 50mg  -SBP gaol 100-160  -NS following     2. CV:  -SBPs: 120s-140s  -MAP: 70s-80s  -HR: 60s-70s  -Home meds: Norvasc 10mg  -Labetelol 10mg prn for SBP >140     3. HEME:              -Hgb: 10.8 (12, 10.5)              -Platelets: 379     4. RESP:              -Extubated 8/25              -On 3L NC, highflow at night              -Duonebs              -Hx of COPD  -IS: encourage     5. GI  -Diet: TF, increased to goal  -Bowel regimen: glycolax, senokot, dulcolax suppository prn, milk of mag prn     6. RENAL              -UOP:  0.8 mL/kg/hr              -IVF: NS @ 0-100mL/hr              -Net: +3.3L              -BUN/Cr: 10/0.28              -Na/K: 141/3.1     7.    MSK:               -Weight bearing status: no restrictions              -PT/OT ordered   8. ID  -Tmax: 38.4  -WBC: 7.6 (11.7)   -Abx: none     9. ENDO              -B  -Insulin: controlled  -Synthroid 200mcg daily     10. Lines  - 2 PIV  -NG     11. PPX:  -DVT: lovenox 30 BID  -GI: PPI discontinued     12. CONSULTS              -Neurosurgery: SDH, craniotomy     13. DISPO:               -Remain in stepdown   -Consider SNF on discharge      Chief Complaint: \"Headache\"    Laureano Díaz is has improved since yesterday. This morning she is complaining of a mild headache. Was transferred out of the ICU overnight. Currently doing well. Still having NG tube feeds. She is suctioning out her mouth herself. Voiding since Sheikh has been removed. Currently postop day 4 from left-sided craniotomy due to evacuation of subdural hematoma. Neurosurgery continuing to follow. Was extubated a few days ago, currently stable on nasal cannula oxygen during the day and high flow at night. Most likely will require placement to nursing facility. OBJECTIVE  VITALS: Temp: Temp: 99 °F (37.2 °C)Temp  Av.2 °F (37.3 °C)  Min: 98.2 °F (36.8 °C)  Max: 101.2 °F (98.9 °C) BP Systolic (23PNY), RGE:640 , Min:100 , STV:473   Diastolic (03ARN), WZN:42, Min:48, Max:125   Pulse Pulse  Av.6  Min: 64  Max: 99 Resp Resp  Av.2  Min: 6  Max: 20 Pulse ox SpO2  Av.8 %  Min: 89 %  Max: 100 %  GENERAL: alert, no distress  NEURO: Strength 5/5 in bilateral lower extremities. Slightly diminished  strength on the right, unchanged from yesterday. Sensation intact. Follows commands. Does not speak much. HEENT: Staples present in the head. Bruising noted around left eye.   : deferred  LUNGS:  clear to ausculation, without wheezes, rales or rhonci  HEART: normal rate and regular rhythm  ABDOMEN: soft, non-tender, non-distended, and no guarding or peritoneal signs present  EXTREMITY: no cyanosis, clubbing or edema    I/O last 3 completed shifts: In: 3725.5 [I.V.:2577.5; NG/GT:1148]  Out: 3534 [Urine:2640]    Drain/tube output:  In: 2472.6 [I.V.:1593.6; NG/GT:879]  Out: 2165 [Urine:2165]    LAB:  CBC:   Recent Labs     08/25/22  0452 08/26/22  0240 08/27/22  0316   WBC 7.4 11.7* 7.6   HGB 10.5* 12.0 10.8*   HCT 31.2* 36.3 31.9*   MCV 96.6 97.1 96.1    183 196       BMP:   Recent Labs     08/25/22  1438 08/26/22  0240 08/27/22  0316    140 141   K 3.9 3.8 3.1*    105 106   CO2 28 25 26   BUN 8 8 10   CREATININE 0.31* 0.33* 0.28*   GLUCOSE 108* 129* 108*       COAGS: No results for input(s): APTT, PROT, INR in the last 72 hours. RADIOLOGY:  No new imaging      Madan Antony MD  8/27/22, 3:01 PM        Attending Note    Complaining of headache S/P crainiotomy  I have reviewed the above CASANDRA note(s) and I either performed the key elements of the medical history and physical exam or was present with the resident when the key elements of the medical history and physical exam were performed.  I have discussed the findings, established the care plan and recommendations with residents, TECMARY Antony MD  8/27/2022  3:02 PM;

## 2022-08-27 NOTE — PROGRESS NOTES
Speech Language Pathology  Facility/Department: [facility]  Initial Speech/Language/Cognitive Assessment    NAME: Ale Ortiz  : 1945   MRN: [id number]  ADMISSION DATE: 2022  ADMITTING DIAGNOSIS: has Hypothyroidism; Major depressive disorder; Chronic midline low back pain without sciatica; Iron deficiency anemia; COPD (chronic obstructive pulmonary disease) ([address] Biventricular congestive heart failure ([address] Anemia; New onset a-fib ([facility] Pulmonary fibrosis ([address] Subdural hematoma ([facility] [address] Fall as cause of accidental injury in home as place of occurrence, initial encounter; and Midline shift of brain due to hematoma [facility]) on their problem list.  DATE ONSET: 22    Date of Eval: 2022   Evaluating Therapist: HILLARY Love    RECENT RESULTS  CT OF HEAD/MRI: 22  Impression   1. Status post left hemispheric craniotomy with presumed evacuation of   subdural hematoma. Expected postsurgical changes of subdural and   subcutaneous tissue gas at the surgical site. Subdural blood has decreased   and there is lesser degree of left right mediastinal shift. Primary Complaint: Ale Ortiz is a 69 y.o. female that presented to the Emergency Department following a fall that occurred at home. Patient was sitting in her back yard and stood up to walk away. She then lost balance and fell forward, striking her face on the ground. Status post left hemispheric craniotomy with presumed evacuation of subdural hematoma. Pt was intubated from  and extubated . ST consulted for speech/language assessment. **Pt is currently NPO with NG tube feeds. Physician notes indicate swallowing difficulties. RN spoke with physician as she believes clinical bedside swallow exam is also warranted, however, physician would like to hold at this time. Please place specific swallow evaluation orders if warranted.      Pain:  Pain Assessment  Pain Assessment: 0-10  Pain Level: 3  Lockhart-Baker Pain Rating: No hurt  Patient's Stated Pain Goal: 0 - No pain  Pain Location: Head  Pain Type: Acute pain  Pain Frequency: Continuous  Non-Pharmaceutical Pain Intervention(s): Repositioned  Response to Pain Intervention: Unable to communicate  Faces, Legs, Activity, Cry, and Consolability (FLACC)  Face (F): no particular expression or smile  Legs (L): normal position or relaxed  Activity (A): lying quietly, normal position, moves easily  Cry (C): no cry (awake or asleep)  Consolability (C): content, relaxed  FLACC Score : 0    Vision/ Hearing  Vision  Vision Exceptions: Wears glasses at all times  Hearing  Hearing: Exceptions to [facility]  Hearing Exceptions: Hard of hearing/hearing concerns    Assessment:      Diagnosis: Pt presents with severe dysphonia and dysarthria characterized by decreased breath support for speech, imprecise consonants, decreased speech intelligibility, and hoarse/weak vocal quality with decreased intensity. Receptive language appears to be [facility]. Pt is able to follow all 1 step directions and identify body parts. Pt able to answer yes/no questions 100% independent. Suspect word finding impairment although pt is able to answer personal responsive naming questions accurately. Pt able to verbally identify relatives in the room and is oriented x4. She is able to express 70% of basic wants/needs verbally with repetition. Will need to further assess cognition at a later date due to general medical status and communication impairment on this date. Pt is able to self suction independently. Oral care completed by ST during eval and pt was able to assist with suctioning. Would recommend continued ST services to address communication impairment. Dysphonia suspected to be a result of post extubation VF trauma. Will continue to assess and monitor during hospital stay. Pt and family educated on POC and in agreement.     Recommendations:  Recommendations  Requires SLP Intervention: Yes  Patient Education: Pt educated re speech/language/voice/cognitive assessment. Patient Education Response: Verbalizes understanding  Timed Code Treatment Minutes: 24 Minutes  Duration of Treatment: until discharge          Plan:   Speech Therapy Prognosis  Prognosis: Good  Prognosis Considerations: Family/Community Support;Participation Level  Individuals consulted  Consulted and agree with results and recommendations: RN;Family member;Patient  Family member consulted: son and spouse  Safety Devices  Safety Devices in place: Yes  Type of devices: Bed alarm in place    Goals:  Short-term Goals  Goal 1: Pt will increase speech intelligibility to express wants/needs using dysarthria strategies with moderate cues, 90% intelligibility. Goal 2: Pt will increase vocal intensity/loudness to improve verbal communication utilizing voice strategies with max cues. Goal 3: Pt will complete basic naming task with moderate cues 100% accuracy. Goal 4: Further assessment of speech/language/cognition and set goals as indicated. Patient/family involved in developing goals and treatment plan: yes    Subjective:   Previous level of function and limitations: na     Social/Functional History  Lives With: Spouse  Vision  Vision Exceptions: Wears glasses at all times  Hearing  Hearing: Exceptions to [facility]  Hearing Exceptions: Hard of hearing/hearing concerns           Objective:       Oral Motor   Labial: Impaired coordination  Dentition: Edentulous  Oral Hygiene: Xerostomia;Dried secretions  Lingual: Decreased strength;Decreased rate  Gag: Present    Motor Speech  Apraxic Characteristics: None  Dysarthric Characteristics: Blended word boundaries; Decreased breath support;Decreased rate; Imprecise;Phonation/respiration incoordination  Intelligibility: Impaired  Word Intelligibility (%): 40 %  Overall Impairment Severity: Severe    Auditory Comprehension  Comprehension: Within Functional Limits         Expression  Primary Mode of Expression: Verbal    Verbal Expression  Verbal Expression: Exceptions to functional limits  Initiation: Moderate  Responsive: Mild  Conversation: Moderate  Effective Techniques: Oral motor techniques; Overarticulate    Written Expression  Written Expression: Unable to assess         Pragmatics/Social Functioning  Pragmatics: Within functional limits    Cognition:      Orientation  Overall Orientation Status: Within Normal Limits  Attention  Attention: Exceptions to [facility]  Sustained Attention: Moderate  Problem Solving  Problem Solving: Exceptions to [facility]  Verbal Reasoning Skills: Moderate      Prognosis:  Speech Therapy Prognosis  Prognosis: Good  Prognosis Considerations: Family/Community Support;Participation Level  Individuals consulted  Consulted and agree with results and recommendations: RN;Family member;Patient  Family member consulted: son and spouse    Education:  Patient Education: Pt educated re speech/language/voice/cognitive assessment.   Patient Education Response: Verbalizes understanding  Safety Devices in place: Yes  Type of devices: Bed alarm in place    Therapy Time:   Individual Concurrent Group Co-treatment   Time In 0959         Time Out 1023         Minutes 24            Timed Code Treatment Minutes: 24 Minutes    Electronically signed by HILLARY Mahmood on 8/27/2022 at 10:37 AM

## 2022-08-27 NOTE — PLAN OF CARE
Problem: Discharge Planning  Goal: Discharge to home or other facility with appropriate resources  8/27/2022 0215 by Juanita Warner RN  Outcome: Progressing  8/26/2022 1409 by Abbi Chow RN  Outcome: Progressing     Problem: Pain  Goal: Verbalizes/displays adequate comfort level or baseline comfort level  8/27/2022 0215 by Juanita Warner RN  Outcome: Progressing  8/26/2022 1409 by Abbi Chow RN  Outcome: Progressing     Problem: Skin/Tissue Integrity  Goal: Absence of new skin breakdown  Description: 1. Monitor for areas of redness and/or skin breakdown  2. Assess vascular access sites hourly  3. Every 4-6 hours minimum:  Change oxygen saturation probe site  4. Every 4-6 hours:  If on nasal continuous positive airway pressure, respiratory therapy assess nares and determine need for appliance change or resting period.   8/27/2022 0215 by Juanita Warner RN  Outcome: Progressing  8/26/2022 1409 by Abbi Chow RN  Outcome: Progressing     Problem: Safety - Adult  Goal: Free from fall injury  8/27/2022 0215 by Juanita Warner RN  Outcome: Progressing  8/26/2022 1409 by Abbi Chow RN  Outcome: Progressing     Problem: ABCDS Injury Assessment  Goal: Absence of physical injury  8/27/2022 0215 by Juanita Warner RN  Outcome: Progressing  8/26/2022 1409 by Abbi Chow RN  Outcome: Progressing     Problem: Chronic Conditions and Co-morbidities  Goal: Patient's chronic conditions and co-morbidity symptoms are monitored and maintained or improved  8/27/2022 0215 by Juanita Warner RN  Outcome: Progressing  8/26/2022 1409 by Abbi Chow RN  Outcome: Progressing     Problem: Nutrition Deficit:  Goal: Optimize nutritional status  8/27/2022 0215 by Juanita Warner RN  Outcome: Progressing  8/26/2022 1409 by Abbi Chow RN  Outcome: Progressing  Flowsheets (Taken 8/26/2022 1217 by Gia Anthony RD)  Nutrient intake appropriate for improving, restoring, or maintaining nutritional needs:   Assess nutritional status and recommend course of action   Recommend, monitor, and adjust tube feedings and TPN/PPN based on assessed needs     Problem: Respiratory - Adult  Goal: Achieves optimal ventilation and oxygenation  8/27/2022 0215 by Judi Justice, RN  Outcome: Progressing  8/26/2022 1409 by Shelby Adams RN  Outcome: Progressing

## 2022-08-28 LAB
ABSOLUTE EOS #: 0.13 K/UL (ref 0–0.44)
ABSOLUTE IMMATURE GRANULOCYTE: <0.03 K/UL (ref 0–0.3)
ABSOLUTE LYMPH #: 1.04 K/UL (ref 1.1–3.7)
ABSOLUTE MONO #: 0.67 K/UL (ref 0.1–1.2)
ANION GAP SERPL CALCULATED.3IONS-SCNC: 10 MMOL/L (ref 9–17)
BASOPHILS # BLD: 1 % (ref 0–2)
BASOPHILS ABSOLUTE: 0.04 K/UL (ref 0–0.2)
BUN BLDV-MCNC: 12 MG/DL (ref 8–23)
CALCIUM SERPL-MCNC: 9.4 MG/DL (ref 8.6–10.4)
CHLORIDE BLD-SCNC: 102 MMOL/L (ref 98–107)
CO2: 24 MMOL/L (ref 20–31)
CREAT SERPL-MCNC: 0.27 MG/DL (ref 0.5–0.9)
EOSINOPHILS RELATIVE PERCENT: 3 % (ref 1–4)
GFR AFRICAN AMERICAN: >60 ML/MIN
GFR NON-AFRICAN AMERICAN: >60 ML/MIN
GFR SERPL CREATININE-BSD FRML MDRD: ABNORMAL ML/MIN/{1.73_M2}
GLUCOSE BLD-MCNC: 100 MG/DL (ref 70–99)
GLUCOSE BLD-MCNC: 105 MG/DL (ref 65–105)
GLUCOSE BLD-MCNC: 108 MG/DL (ref 65–105)
GLUCOSE BLD-MCNC: 117 MG/DL (ref 65–105)
GLUCOSE BLD-MCNC: 124 MG/DL (ref 65–105)
GLUCOSE BLD-MCNC: 87 MG/DL (ref 65–105)
HCT VFR BLD CALC: 33.7 % (ref 36.3–47.1)
HEMOGLOBIN: 11 G/DL (ref 11.9–15.1)
IMMATURE GRANULOCYTES: 0 %
LYMPHOCYTES # BLD: 21 % (ref 24–43)
MAGNESIUM: 2.2 MG/DL (ref 1.6–2.6)
MCH RBC QN AUTO: 32.2 PG (ref 25.2–33.5)
MCHC RBC AUTO-ENTMCNC: 32.6 G/DL (ref 28.4–34.8)
MCV RBC AUTO: 98.5 FL (ref 82.6–102.9)
MONOCYTES # BLD: 14 % (ref 3–12)
NRBC AUTOMATED: 0 PER 100 WBC
PDW BLD-RTO: 14.1 % (ref 11.8–14.4)
PLATELET # BLD: ABNORMAL K/UL (ref 138–453)
PLATELET, FLUORESCENCE: NORMAL K/UL (ref 138–453)
POTASSIUM SERPL-SCNC: 3.6 MMOL/L (ref 3.7–5.3)
RBC # BLD: 3.42 M/UL (ref 3.95–5.11)
SEG NEUTROPHILS: 62 % (ref 36–65)
SEGMENTED NEUTROPHILS ABSOLUTE COUNT: 3.02 K/UL (ref 1.5–8.1)
SODIUM BLD-SCNC: 136 MMOL/L (ref 135–144)
WBC # BLD: 4.9 K/UL (ref 3.5–11.3)

## 2022-08-28 PROCEDURE — 6370000000 HC RX 637 (ALT 250 FOR IP): Performed by: STUDENT IN AN ORGANIZED HEALTH CARE EDUCATION/TRAINING PROGRAM

## 2022-08-28 PROCEDURE — 97535 SELF CARE MNGMENT TRAINING: CPT

## 2022-08-28 PROCEDURE — 83735 ASSAY OF MAGNESIUM: CPT

## 2022-08-28 PROCEDURE — 97116 GAIT TRAINING THERAPY: CPT

## 2022-08-28 PROCEDURE — 6370000000 HC RX 637 (ALT 250 FOR IP): Performed by: REGISTERED NURSE

## 2022-08-28 PROCEDURE — 94640 AIRWAY INHALATION TREATMENT: CPT

## 2022-08-28 PROCEDURE — 51701 INSERT BLADDER CATHETER: CPT

## 2022-08-28 PROCEDURE — 51798 US URINE CAPACITY MEASURE: CPT

## 2022-08-28 PROCEDURE — 6370000000 HC RX 637 (ALT 250 FOR IP): Performed by: NURSE PRACTITIONER

## 2022-08-28 PROCEDURE — 85055 RETICULATED PLATELET ASSAY: CPT

## 2022-08-28 PROCEDURE — APPSS30 APP SPLIT SHARED TIME 16-30 MINUTES: Performed by: REGISTERED NURSE

## 2022-08-28 PROCEDURE — 2580000003 HC RX 258: Performed by: NURSE PRACTITIONER

## 2022-08-28 PROCEDURE — 36415 COLL VENOUS BLD VENIPUNCTURE: CPT

## 2022-08-28 PROCEDURE — 97530 THERAPEUTIC ACTIVITIES: CPT

## 2022-08-28 PROCEDURE — 94761 N-INVAS EAR/PLS OXIMETRY MLT: CPT

## 2022-08-28 PROCEDURE — 2700000000 HC OXYGEN THERAPY PER DAY

## 2022-08-28 PROCEDURE — 80048 BASIC METABOLIC PNL TOTAL CA: CPT

## 2022-08-28 PROCEDURE — 6360000002 HC RX W HCPCS: Performed by: STUDENT IN AN ORGANIZED HEALTH CARE EDUCATION/TRAINING PROGRAM

## 2022-08-28 PROCEDURE — 2060000000 HC ICU INTERMEDIATE R&B

## 2022-08-28 PROCEDURE — 2580000003 HC RX 258: Performed by: PHYSICIAN ASSISTANT

## 2022-08-28 PROCEDURE — 6370000000 HC RX 637 (ALT 250 FOR IP): Performed by: PHYSICIAN ASSISTANT

## 2022-08-28 PROCEDURE — 92610 EVALUATE SWALLOWING FUNCTION: CPT

## 2022-08-28 PROCEDURE — 85025 COMPLETE CBC W/AUTO DIFF WBC: CPT

## 2022-08-28 PROCEDURE — 94664 DEMO&/EVAL PT USE INHALER: CPT

## 2022-08-28 PROCEDURE — 2500000003 HC RX 250 WO HCPCS

## 2022-08-28 RX ORDER — POTASSIUM CHLORIDE 20MEQ/15ML
20 LIQUID (ML) ORAL DAILY
Status: DISCONTINUED | OUTPATIENT
Start: 2022-08-28 | End: 2022-08-30 | Stop reason: HOSPADM

## 2022-08-28 RX ORDER — METHOCARBAMOL 750 MG/1
750 TABLET, FILM COATED ORAL 4 TIMES DAILY
Status: DISCONTINUED | OUTPATIENT
Start: 2022-08-28 | End: 2022-08-28

## 2022-08-28 RX ORDER — GABAPENTIN 100 MG/1
100 CAPSULE ORAL 3 TIMES DAILY
Status: CANCELLED | OUTPATIENT
Start: 2022-08-28

## 2022-08-28 RX ORDER — METHOCARBAMOL 750 MG/1
750 TABLET, FILM COATED ORAL 4 TIMES DAILY
Status: DISCONTINUED | OUTPATIENT
Start: 2022-08-28 | End: 2022-08-29

## 2022-08-28 RX ADMIN — LEVETIRACETAM 1000 MG: 500 SOLUTION ORAL at 09:49

## 2022-08-28 RX ADMIN — PREGABALIN 300 MG: 100 CAPSULE ORAL at 07:45

## 2022-08-28 RX ADMIN — IPRATROPIUM BROMIDE AND ALBUTEROL SULFATE 3 ML: .5; 3 SOLUTION RESPIRATORY (INHALATION) at 23:36

## 2022-08-28 RX ADMIN — METHOCARBAMOL TABLETS 750 MG: 750 TABLET, COATED ORAL at 21:00

## 2022-08-28 RX ADMIN — SODIUM CHLORIDE 1000 ML: 9 INJECTION, SOLUTION INTRAVENOUS at 00:16

## 2022-08-28 RX ADMIN — OLANZAPINE 5 MG: 5 TABLET, FILM COATED ORAL at 21:02

## 2022-08-28 RX ADMIN — ENOXAPARIN SODIUM 30 MG: 100 INJECTION SUBCUTANEOUS at 21:02

## 2022-08-28 RX ADMIN — METHOCARBAMOL TABLETS 750 MG: 750 TABLET, COATED ORAL at 10:31

## 2022-08-28 RX ADMIN — PREGABALIN 300 MG: 100 CAPSULE ORAL at 21:01

## 2022-08-28 RX ADMIN — IPRATROPIUM BROMIDE AND ALBUTEROL SULFATE 3 ML: .5; 3 SOLUTION RESPIRATORY (INHALATION) at 20:10

## 2022-08-28 RX ADMIN — AMLODIPINE BESYLATE 10 MG: 5 TABLET ORAL at 07:45

## 2022-08-28 RX ADMIN — ACETAMINOPHEN 650 MG: 325 TABLET ORAL at 10:30

## 2022-08-28 RX ADMIN — ACETAMINOPHEN 650 MG: 325 TABLET ORAL at 05:37

## 2022-08-28 RX ADMIN — STANDARDIZED SENNA CONCENTRATE AND DOCUSATE SODIUM 1 TABLET: 8.6; 5 TABLET ORAL at 21:02

## 2022-08-28 RX ADMIN — POTASSIUM CHLORIDE 20 MEQ: 40 SOLUTION ORAL at 17:43

## 2022-08-28 RX ADMIN — LEVETIRACETAM 1000 MG: 500 SOLUTION ORAL at 21:42

## 2022-08-28 RX ADMIN — METHOCARBAMOL TABLETS 750 MG: 750 TABLET, COATED ORAL at 17:43

## 2022-08-28 RX ADMIN — SODIUM CHLORIDE, PRESERVATIVE FREE 10 ML: 5 INJECTION INTRAVENOUS at 21:03

## 2022-08-28 RX ADMIN — SERTRALINE 50 MG: 50 TABLET, FILM COATED ORAL at 07:44

## 2022-08-28 RX ADMIN — ENOXAPARIN SODIUM 30 MG: 100 INJECTION SUBCUTANEOUS at 07:43

## 2022-08-28 RX ADMIN — ACETAMINOPHEN 650 MG: 325 TABLET ORAL at 23:11

## 2022-08-28 RX ADMIN — SODIUM CHLORIDE, PRESERVATIVE FREE 10 ML: 5 INJECTION INTRAVENOUS at 07:46

## 2022-08-28 RX ADMIN — IPRATROPIUM BROMIDE AND ALBUTEROL SULFATE 3 ML: .5; 3 SOLUTION RESPIRATORY (INHALATION) at 15:31

## 2022-08-28 RX ADMIN — IPRATROPIUM BROMIDE AND ALBUTEROL SULFATE 3 ML: .5; 3 SOLUTION RESPIRATORY (INHALATION) at 11:05

## 2022-08-28 RX ADMIN — Medication 10 MG: at 21:31

## 2022-08-28 RX ADMIN — LEVOTHYROXINE SODIUM 200 MCG: 125 TABLET ORAL at 07:44

## 2022-08-28 RX ADMIN — IPRATROPIUM BROMIDE AND ALBUTEROL SULFATE 3 ML: .5; 3 SOLUTION RESPIRATORY (INHALATION) at 07:28

## 2022-08-28 RX ADMIN — ACETAMINOPHEN 650 MG: 325 TABLET ORAL at 17:45

## 2022-08-28 ASSESSMENT — PAIN DESCRIPTION - LOCATION
LOCATION: HEAD
LOCATION: HEAD
LOCATION: HEAD;BACK
LOCATION: HEAD
LOCATION: HEAD

## 2022-08-28 ASSESSMENT — PAIN DESCRIPTION - ORIENTATION
ORIENTATION: LEFT;ANTERIOR
ORIENTATION: ANTERIOR
ORIENTATION: LEFT;ANTERIOR

## 2022-08-28 ASSESSMENT — PAIN SCALES - GENERAL
PAINLEVEL_OUTOF10: 7
PAINLEVEL_OUTOF10: 10
PAINLEVEL_OUTOF10: 10
PAINLEVEL_OUTOF10: 6
PAINLEVEL_OUTOF10: 10
PAINLEVEL_OUTOF10: 5
PAINLEVEL_OUTOF10: 7
PAINLEVEL_OUTOF10: 10
PAINLEVEL_OUTOF10: 7

## 2022-08-28 ASSESSMENT — PAIN DESCRIPTION - DESCRIPTORS
DESCRIPTORS: ACHING;DULL;DISCOMFORT
DESCRIPTORS: ACHING;DISCOMFORT
DESCRIPTORS: ACHING;DULL;DISCOMFORT
DESCRIPTORS: ACHING;DISCOMFORT;DULL
DESCRIPTORS: ACHING

## 2022-08-28 ASSESSMENT — PAIN DESCRIPTION - FREQUENCY: FREQUENCY: CONTINUOUS

## 2022-08-28 NOTE — PROGRESS NOTES
Occupational Therapy  Facility/Department: [facility]- [facility]  Occupational Therapy Daily Treatment Note    Name: Amber Kwon  : 1945  MRN: [id number]  Date of Service: 2022    Discharge Recommendations: Further therapy recommended at discharge. The patient should be able to tolerate at least 3 hours of therapy per day over 5 days or 15 hours over 7 days. This patient may benefit from a Physical Medicine and Rehab consult. OT Equipment Recommendations  ADL Assistive Devices: Reacher;Long-handled Sponge       Patient Diagnosis(es): The encounter diagnosis was Subdural hematoma ([address] Past Medical History:  has a past medical history of A-fib ([address] Biventricular congestive heart failure ([address] Bronchiectasis with acute exacerbation ([address] CAD (coronary artery disease), Chronic pain syndrome, COPD (chronic obstructive pulmonary disease) ([address] Depression, GERD (gastroesophageal reflux disease), Hypothyroidism, Impaired fasting glucose, Iron deficiency anemia, Osteoporosis, Pulmonary fibrosis ([address] and Subdural hematoma ([address] Past Surgical History:  has a past surgical history that includes Hysterectomy (1991); Carpal tunnel release (Right, 1999); Total knee arthroplasty (Right, 2021); fracture surgery (Left, 2018); Wrist fracture surgery (Left, 2018); lumbar discectomy (1993); Lumbar disc surgery (02/15/1994); back surgery (1998); back surgery (1999); Carpal tunnel release (Left, 1999); Neck surgery (2002); Carpal tunnel release (Right, 2002); Carpal tunnel release (Left, 2003); back surgery (10/23/2003); back surgery (10/23/2003); Cervical disc surgery (10/25/2004); Cervical disc surgery (2004); Neck surgery (2005); Toe amputation (10/08/2006); Toe amputation (2008); lumbar laminectomy (2008); Toe amputation (10/03/2008); Humerus fracture surgery (Right, 10/03/2010);  Knee arthroscopy (Right, 06/02/2011); back surgery (09/11/2017); knee surgery (Right, 02/04/2016); Wrist fracture surgery (Left, 12/20/2018); Wrist surgery (Left, 07/02/2019); and craniotomy (Left, 8/23/2022). Assessment   Performance deficits / Impairments: Decreased functional mobility ; Decreased safe awareness;Decreased cognition;Decreased ADL status; Decreased ROM; Decreased strength;Decreased endurance;Decreased balance;Decreased high-level IADLs;Decreased fine motor control;Decreased coordination  Assessment: Patient would benefit from continued acute OT services to increase safety and independence with ADLs/IADLs. Prognosis: Good  Activity Tolerance  Activity Tolerance: Patient limited by fatigue;Patient limited by pain; Patient Tolerated treatment well        Plan   Plan  Times per Week: 4-5 x/wk  Current Treatment Recommendations: Strengthening, ROM, Balance training, Functional mobility training, Endurance training, Safety education & training, Patient/Caregiver education & training, Equipment evaluation, education, & procurement, Self-Care / ADL, Home management training, Cognitive/Perceptual training, Coordination training     Restrictions  Restrictions/Precautions  Restrictions/Precautions: Fall Risk, Surgical Protocols, Up as Tolerated, Aspiration Risk, Contact Precautions  Required Braces or Orthoses?: No  Position Activity Restriction  Other position/activity restrictions: Up with assist, -140, s/p L craniotomy for SDH evacuation; HOB >30 degrees    Subjective   General  Patient assessed for rehabilitation services?: Yes  Family / Caregiver Present: Yes ( and Son)  General Comment  Comments: RN ok'd patient for OT tx this date. Patient pleasant, agreeable, and cooperative throughout. Patient reported pain 10/10 headache. Objective   SpO2: 99 %  O2 Device: Heated high flow cannula  Safety Devices  Type of Devices: Nurse notified;Call light within reach; Left in chair; All fall risk precautions in progression  Cognition  Overall Cognitive Status: Exceptions  Arousal/Alertness: Appropriate responses to stimuli  Following Commands: Follows multistep commands with increased time; Follows multistep commands with repitition  Attention Span: Attends with cues to redirect  Memory: Appears intact  Safety Judgement: Decreased awareness of need for assistance  Problem Solving: Assistance required to generate solutions;Assistance required to identify errors made  Insights: Decreased awareness of deficits  Sequencing: Requires cues for some  Orientation  Overall Orientation Status: Within Normal Limits  Orientation Level: Oriented X4  Education Given To: Patient; Family  Education Provided: Role of Therapy;Plan of Care;Transfer Training  Education Provided Comments: Role of Therapy; Plan of Care; Transfer Training; safety awareness  Education Method: Verbal  Barriers to Learning: Cognition  Education Outcome: Continued education needed;Verbalized understanding;Demonstrated understanding     AM-PAC Score  AM-[facility] Inpatient Daily Activity Raw Score: 14 (08/28/22 1030)  AM-PAC Inpatient ADL T-Scale Score : 33.39 (08/28/22 1030)  ADL Inpatient CMS 0-100% Score: 59.67 (08/28/22 1030)  ADL Inpatient CMS G-Code Modifier : CK (08/28/22 1030)     Goals  Short Term Goals  Time Frame for Short term goals: By discharge, patient will  Short Term Goal 1: demo bed mobility at [facility] to promote OOB activity  Short Term Goal 2: demo feeding/grooming at [facility]  Short Term Goal 3: demo 15+ minutes dynamic sitting balance at [facility] during ADLs (updated by LANDON Quigley on 8/28)  Short Term Goal 4: demo UB ADLs at [facility]  Short Term Goal 5: demo LB ADLs/toileting at Mod A with AE use PRN  Short Term Goal 6: demo standing during func activity for 2 min+ without seated rest break using LRD andCGA (updated by LANDON Quigley on 8/28)  Short Term Goal 7: demo good safety awareness during func mob at bedside using LRD and min A, 1 cue (updated by LANDON Quigley on 8/28)       Therapy Time   Individual Concurrent Group Co-treatment   Time In 0850         Time Out 0950         Minutes 60         Timed Code Treatment Minutes: EB Baker/L

## 2022-08-28 NOTE — PROGRESS NOTES
Speech Language Pathology  Facility/Department: [facility]   CLINICAL BEDSIDE SWALLOW EVALUATION    NAME: Anu Faria  : 1945  MRN: [id number]    ADMISSION DATE: 2022  ADMITTING DIAGNOSIS: has Hypothyroidism; Major depressive disorder; Chronic midline low back pain without sciatica; Iron deficiency anemia; COPD (chronic obstructive pulmonary disease) ([address] Biventricular congestive heart failure ([address] Anemia; New onset a-fib ([address] Pulmonary fibrosis ([address] Subdural hematoma ([facility] [address] Fall as cause of accidental injury in home as place of occurrence, initial encounter; and 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    Recent Chest Xray/CT of Chest: 2022    Impression   1. Interval removal of the endotracheal tube. Enteric tube remains in place. 2.  Mildly increased patchy airspace opacity at the right base. Patchy   airspace opacities throughout the remainder of the lungs are otherwise   unchanged. Date of Eval: 2022  Evaluating Therapist: HILLARY Myers    Current Diet level:  Current Diet : NPO    Primary Complaint  Anu Faria is a 69 y.o. female that presented to the Emergency Department following a fall that occurred at home. Patient was sitting in her back yard and stood up to walk away. She then lost balance and fell forward, striking her face on the ground. She did not lose consciousness. She takes Xarelto for a history of Afib, and ASA for CAD. At time of assessment she only complains of headache. No distal weakness of paresthesia. Pain:  Pain Assessment  Pain Assessment: 0-10  Pain Level: 10    Reason for Referral  Anu Faria was referred for a bedside swallow evaluation to assess the efficiency of her swallow function, identify signs and symptoms of aspiration and make recommendations regarding safe dietary consistencies, effective compensatory strategies, and safe eating environment.     Impression  Recommend continued NPO w/ alternative means of nutrition at this time. Recommend MBSS in 1-2 days, prior to initiation of PO diet. Pt dysphonic, self suctioning after productive cough upon ST arrival. +latent wet throat clear and cough x3/3 trials of puree, +wet cough x2/2 trials of ice chips. Pt w/ increasing wet vocal quality with increased bolus presentations. Recommendations discussed w/ RN. Dysphagia Diagnosis: Suspected needs further assessment;Mild to moderate pharyngeal stage dysphagia  Dysphagia Outcome Severity Scale: Level 1: Severe dysphagia- NPO. Unable to tolerate any PO safely     Treatment Plan  Requires SLP Intervention: Yes  D/C Recommendations: Ongoing speech therapy is recommended during this hospitalization  Referral To: ENT  Frequency: 3-5x per week    Recommended Diet and Intervention  Recommendations: NPO   Recommended Form of Meds: Via alternative means of nutrition  Recommendations: Modified barium swallow study  Therapeutic Interventions: Diet tolerance monitoring;Patient/Family education    Treatment/Goals  Dysphagia Goals: The patient will tolerate recommended diet without observed clinical signs of aspiration; The patient will tolerate instrumental swallowing procedure; The patient will tolerate repeat bedside swallowing evaluation when able. General  Chart Reviewed: Yes  Behavior/Cognition: Cooperative; Alert  Temperature Spikes Noted: No  Respiratory Status: O2 via nasual cannula  O2 Device: Nasal cannula  Communication Observation: Functional  Follows Directions: Simple  Dentition: Edentulous  Patient Positioning: Upright in bed  Baseline Vocal Quality: Dysphonic  Volitional Cough: Wet;Weak  Consistencies Administered: Pureed; Ice Chips    Vision/Hearing  Vision  Vision: Impaired  Vision Exceptions: Wears glasses at all times  Hearing  Hearing: Exceptions to [facility]  Hearing Exceptions: Hard of hearing/hearing concerns    Oral Phase Dysfunction  Oral Phase  Oral Phase: Exceptions  Oral Phase  Oral Phase - Comment: Oral transit and bolus manipulation appear WFL for all consistencies tested     Indicators of Pharyngeal Phase Dysfunction   Pharyngeal Phase   Pharyngeal Phase: +latent wet throat clear and cough x3/3 trials of puree, +wet cough x2/2 trials of ice chips. Pt w/ increasing wet vocal quality with increased bolus presentations.  Note pt w/ productive cough and self suctioning prior to PO trials    Prognosis  Individuals consulted  Consulted and agree with results and recommendations: Patient;RN    Education  Patient Education: yes  Patient Education Response: Demonstrated understanding  Type of devices: Bed alarm in place       Therapy Time  [phone] (10 minutes)     HILLARY Youngblood  8/28/2022 9:01 AM

## 2022-08-28 NOTE — PROGRESS NOTES
PROGRESS NOTE          PATIENT NAME: Janet [facility] RECORD NO. [id number]  DATE: 8/28/2022  SURGEON: Faby Alvarez  PRIMARY CARE PHYSICIAN: Jesica Horton MD    HD: # 6    ASSESSMENT    Patient Active Problem List   Diagnosis    Hypothyroidism    Major depressive disorder    Chronic midline low back pain without sciatica    Iron deficiency anemia    COPD (chronic obstructive pulmonary disease) (HCC)    Biventricular congestive heart failure (HCC)    Anemia    New onset a-fib (HCC)    Pulmonary fibrosis ([address]    Subdural hematoma ([facility] [address]    Fall as cause of accidental injury in home as place of occurrence, initial encounter    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 MMT: tylenol 650 q6, lyrica 300mg BID , added Robaxin for headache  -L sided SDH with midline shift  -POD 4 from L craniotomy for evacuation of SDH  -Subgaleal drain removed   -LTME completed, showed moderate nonspecific encephalopathy with presence of left parietotemporal sharps increase. Also shows  interhemispheric asymmetry suggesting breach rhythm and underlying dysfunction on the left hemisphere. No clear arrhythmia. -Keppra 1000 BID  -Zoloft 50mg  -SBP gaol 100-160  -NS following     2. CV:  -SBPs: 120s-140s  -MAP: 70s-90s  -HR: 60s-90s  -Home meds: Norvasc 10mg       3. HEME:              -Hgb: 11               -Platelets: 720     4. RESP:              -Extubated 8/25              -On 5L NC              -Duonebs              -Hx of COPD  -IS: encourage     5. GI  -Diet: TF, increased to goal  -Bowel regimen: glycolax, senokot, dulcolax suppository prn, milk of mag prn     6. RENAL              -UOP:  1.6 mL/kg/hr              -IVF: Stopped              -BUN/Cr: 12/0.27              -Na/K: 136/3.6     7. MSK:               -Weight bearing status: no restrictions              -PT/OT ordered   8.    ID  -Tmax: 37.1  -WBC: 4.9 (7.6 )   -Abx: none 9.   ENDO              -B  -Insulin: controlled  -Synthroid 200mcg daily     10. Lines  - 2 PIV  -NG     11. PPX:  -DVT: lovenox 30 BID  -GI: PPI discontinued     12. CONSULTS              -Neurosurgery: SDH, craniotomy     13. DISPO:               -Remain in stepdown   -Consider SNF on discharge   - Swallow test next Tuesday      Chief Complaint: \"Headache\"    [address] no events overnight , complaining today of headache, asks ig NG tube can be pulled out . OBJECTIVE  VITALS: Temp: Temp: 98.7 °F (37.1 °C)Temp  Av.7 °F (37.1 °C)  Min: 98.2 °F (36.8 °C)  Max: 99.2 °F (74.2 °C) BP Systolic (57LRA), RIGOBERTO:454 , Min:116 , KDM:381   Diastolic (29MEV), IQB:73, Min:58, Max:73   Pulse Pulse  Av.1  Min: 69  Max: 91 Resp Resp  Avg: 15.1  Min: 11  Max: 18 Pulse ox SpO2  Av.8 %  Min: 92 %  Max: 99 %  GENERAL: alert, no distress  NEURO: Strength 5/5 in bilateral lower extremities. Slightly diminished  strength on the right, unchanged from yesterday. Sensation intact. Follows commands. Does not speak much. HEENT: Staples present in the head. Bruising noted around left eye. : deferred  LUNGS:  clear to ausculation, without wheezes, rales or rhonci  HEART: normal rate and regular rhythm  ABDOMEN: soft, non-tender, non-distended, and no guarding or peritoneal signs present  EXTREMITY: no cyanosis, clubbing or edema    I/O last 3 completed shifts: In: 4504.6 [I.V.:2604.6; NG/GT:1900]  Out: 3650 [Urine:3650]    Drain/tube output:   In: 3453.2 [I.V.:2014.2; NG/GT:1439]  Out: 6135 [Urine:2850]    LAB:  CBC:   Recent Labs     22  0240 22  0316 22  0456   WBC 11.7* 7.6 4.9   HGB 12.0 10.8* 11.0*   HCT 36.3 31.9* 33.7*   MCV 97.1 96.1 98.5    196 See Reflexed IPF Result     BMP:   Recent Labs     22  0240 22  0316 22  0456    141 136   K 3.8 3.1* 3.6*    106 102   CO2 25 26 24   BUN 8 10 12   CREATININE 0.33* 0.28* 0.27* GLUCOSE 129* 108* 100*     COAGS: No results for input(s): APTT, PROT, INR in the last 72 hours.     RADIOLOGY:  No new imaging      Vickie Davis MD  8/27/22, 1:24 PM

## 2022-08-28 NOTE — PLAN OF CARE
Problem: Discharge Planning  Goal: Discharge to home or other facility with appropriate resources  Outcome: Progressing  Flowsheets (Taken 8/27/2022 2000)  Discharge to home or other facility with appropriate resources:   Identify barriers to discharge with patient and caregiver   Arrange for needed discharge resources and transportation as appropriate     Problem: Pain  Goal: Verbalizes/displays adequate comfort level or baseline comfort level  Outcome: Progressing     Problem: Skin/Tissue Integrity  Goal: Absence of new skin breakdown  Description: 1. Monitor for areas of redness and/or skin breakdown  2. Assess vascular access sites hourly  3. Every 4-6 hours minimum:  Change oxygen saturation probe site  4. Every 4-6 hours:  If on nasal continuous positive airway pressure, respiratory therapy assess nares and determine need for appliance change or resting period.   Outcome: Progressing     Problem: Safety - Adult  Goal: Free from fall injury  Outcome: Progressing     Problem: ABCDS Injury Assessment  Goal: Absence of physical injury  Outcome: Progressing     Problem: Chronic Conditions and Co-morbidities  Goal: Patient's chronic conditions and co-morbidity symptoms are monitored and maintained or improved  Outcome: Progressing  Flowsheets (Taken 8/27/2022 2000)  Care Plan - Patient's Chronic Conditions and Co-Morbidity Symptoms are Monitored and Maintained or Improved: Monitor and assess patient's chronic conditions and comorbid symptoms for stability, deterioration, or improvement     Problem: Nutrition Deficit:  Goal: Optimize nutritional status  Outcome: Progressing     Problem: Respiratory - Adult  Goal: Achieves optimal ventilation and oxygenation  Outcome: Progressing

## 2022-08-28 NOTE — PROGRESS NOTES
Neurosurgery LAUREN/Resident    Daily Progress Note   Chief Complaint   Patient presents with    Trauma     8/28/2022  10:49 AM    Chart reviewed. No acute events overnight. No new complaints. Dysarthria improving. Vitals:    08/28/22 0416 08/28/22 0730 08/28/22 0736 08/28/22 0738   BP: 137/66   (!) 140/64   Pulse: 86 69  71   Resp: 14 11  16   Temp: 98.4 °F (36.9 °C)   98.2 °F (36.8 °C)   TempSrc: Oral   Oral   SpO2: 95% 98% 99%    Weight: 160 lb 11.5 oz (72.9 kg)      Height:         PE:   Alert, oriented x3, dysarthric   Follow all commands  Motor   Moving all extremities   Antigravity to bilat UE and LE     Incision cranial site open to air, dry and clean      Lab Results   Component Value Date    WBC 4.9 08/28/2022    HGB 11.0 (L) 08/28/2022    HCT 33.7 (L) 08/28/2022    PLT See Reflexed IPF Result 08/28/2022    CHOL 251 (H) 06/20/2022    TRIG 84 08/25/2022    HDL 77 06/20/2022    ALT 11 06/20/2022    AST 20 06/20/2022     08/28/2022    K 3.6 (L) 08/28/2022     08/28/2022    CREATININE 0.27 (L) 08/28/2022    BUN 12 08/28/2022    CO2 24 08/28/2022    TSH 11.11 (H) 06/20/2022    INR 1.0 08/24/2022    LABA1C 5.7 06/20/2022    CRP 34.7 (H) 01/18/2022    SEDRATE 5 07/30/2020         A/P  left sided SDH  POD#5 s/p left craniotomy for evacuation of SDH     - PT, OT and SLP, activity as tolerated    - continue HOB >30  - SCDs for dvt ppx and lovenox   - neuro checks      Please contact neurosurgery with any changes in patients neurologic status.        Arline Hutchinson CNP  8/28/22  10:49 AM

## 2022-08-28 NOTE — PROGRESS NOTES
Physical Therapy  Facility/Department: [facility]- STEPDOWN  Daily Treatment Note  NAME: Marry Tolentino  : 1945  MRN: [id number]    Date of Service: 2022    Discharge Recommendations:  Patient would benefit from continued therapy after discharge, Continue to assess pending progress   PT Equipment Recommendations  Equipment Needed: No    Patient Diagnosis(es): The encounter diagnosis was Subdural hematoma ([address] Assessment   Assessment: Pt presents with reduced  functional capacity completing sit<>stand w/mod-Nellie, ambulating 12 ft and 45 ft X1 with 2WW requiring Nellie with general unsteadiness, trunk sway w/difficulty keeping her eyes open 2* mild dizziness when mobilizing. Pt is unsafe to return home this date requiring continued therapy after d/c facilitating return to functional IND. Activity Tolerance: Patient limited by fatigue;Patient limited by endurance;Treatment limited secondary to decreased cognition  Equipment Needed: No     Plan   Plan: 5-7 times per week  Current Treatment Recommendations: Strengthening;ROM;Balance training;Gait training;Equipment evaluation, education, & procurement;Transfer training; Endurance training;Patient/Caregiver education & training; Safety education & training; Therapeutic activities  PT Plan of Care: Daily     Restrictions  Restrictions/Precautions  Restrictions/Precautions: Fall Risk, Surgical Protocols, Up as Tolerated, Aspiration Risk, Contact Precautions  Required Braces or Orthoses?: No  Position Activity Restriction  Other position/activity restrictions: Up with assist, -140, s/p L craniotomy for SDH evacuation; HOB >30 degrees     Subjective    Subjective: Pt. in chair upon arrival, agreeable to therapy at this time. Pain: Pt reports low back and LLE pain 7/10. states her pain is always a 7 and some times worse.   Orientation  Overall Orientation Status: Within Normal Limits  Orientation Level: Oriented X4  Cognition  Overall Cognitive Status: Exceptions  Arousal/Alertness: Appropriate responses to stimuli  Following Commands: Follows multistep commands with increased time; Follows multistep commands with repitition  Attention Span: Attends with cues to redirect  Memory: Appears intact  Safety Judgement: Decreased awareness of need for assistance  Problem Solving: Assistance required to generate solutions;Assistance required to identify errors made  Insights: Decreased awareness of deficits  Initiation: Requires cues for some  Sequencing: Requires cues for some     Objective      Bed Mobility Training  Bed Mobility Training: No  Balance  Sitting: With support  Sitting - Static: Supported sitting  Sitting - Dynamic: Supported sitting  Standing: With support (2WW)  Transfer Training  Transfer Training: Yes  Overall Level of Assistance: Minimum assistance; Additional time  Interventions: Safety awareness training;Verbal cues  Sit to Stand: Minimum assistance; Additional time  Stand to Sit: Minimum assistance; Additional time  Bed to Chair: Assist X2;Minimum assistance  Toilet Transfer:  (pt toileted during session requiring assist for varun care, modA standing from toilet-2WW.)  Gait Training  Gait Training: Yes  Gait  Overall Level of Assistance: Minimum assistance;Assist X1  Interventions: Safety awareness training; Tactile cues; Verbal cues  Speed/Helen: Slow  Step Length: Left shortened;Right shortened  Gait Abnormalities: Antalgic (Pt has difficulty guiding 2WW around obstacles especially her R side, requires min cues facilitating correct use of AD, fatigues quickly.)  Assistive Device: Walker, rolling  Wheelchair Management  Wheelchair Management: No  Neuromuscular Education  Neuromuscular Education: No  Weight Bearing  Weight Bearing Technique: No     Safety Devices  Type of Devices: Nurse notified;Call light within reach; Left in chair; All fall risk precautions in place; Chair alarm in place;Gait belt;Patient at risk for falls  Restraints  Restraints Initially in Place: No     Goals  Short Term Goals  Time Frame for Short term goals: 15  Short term goal 1: Pt to perform bed mobility Nellie  Short term goal 2: Demonstrate functional transfers Nellie  Short term goal 3: Pt to ambulate 50ft w/ RW Nellie  Short term goal 4: Actively participate in 30 minutes of therapy to demo increased endurance  Patient Goals   Patient goals : Unable to state    Education  Patient Education  Education Given To: Patient; Family  Education Provided: Role of Therapy;Plan of Care;Transfer Training  Education Method: Demonstration;Verbal  Barriers to Learning: None  Education Outcome: Verbalized understanding; Unable to verbalize;Continued education needed    Therapy Time   Individual Concurrent Group Co-treatment   Time In 1330         Time Out 1400         Minutes 30                 ALTON SANCHES, PTA

## 2022-08-29 LAB
ABSOLUTE EOS #: 0.15 K/UL (ref 0–0.44)
ABSOLUTE IMMATURE GRANULOCYTE: <0.03 K/UL (ref 0–0.3)
ABSOLUTE LYMPH #: 1.11 K/UL (ref 1.1–3.7)
ABSOLUTE MONO #: 0.87 K/UL (ref 0.1–1.2)
ANION GAP SERPL CALCULATED.3IONS-SCNC: 9 MMOL/L (ref 9–17)
BASOPHILS # BLD: 1 % (ref 0–2)
BASOPHILS ABSOLUTE: 0.03 K/UL (ref 0–0.2)
BUN BLDV-MCNC: 14 MG/DL (ref 8–23)
CALCIUM SERPL-MCNC: 9.6 MG/DL (ref 8.6–10.4)
CHLORIDE BLD-SCNC: 102 MMOL/L (ref 98–107)
CO2: 29 MMOL/L (ref 20–31)
CREAT SERPL-MCNC: 0.28 MG/DL (ref 0.5–0.9)
EOSINOPHILS RELATIVE PERCENT: 3 % (ref 1–4)
GFR AFRICAN AMERICAN: >60 ML/MIN
GFR NON-AFRICAN AMERICAN: >60 ML/MIN
GFR SERPL CREATININE-BSD FRML MDRD: ABNORMAL ML/MIN/{1.73_M2}
GLUCOSE BLD-MCNC: 102 MG/DL (ref 65–105)
GLUCOSE BLD-MCNC: 102 MG/DL (ref 70–99)
HCT VFR BLD CALC: 34.9 % (ref 36.3–47.1)
HEMOGLOBIN: 11.1 G/DL (ref 11.9–15.1)
IMMATURE GRANULOCYTES: 0 %
LYMPHOCYTES # BLD: 21 % (ref 24–43)
MAGNESIUM: 2.3 MG/DL (ref 1.6–2.6)
MCH RBC QN AUTO: 31.2 PG (ref 25.2–33.5)
MCHC RBC AUTO-ENTMCNC: 31.8 G/DL (ref 28.4–34.8)
MCV RBC AUTO: 98 FL (ref 82.6–102.9)
MONOCYTES # BLD: 17 % (ref 3–12)
NRBC AUTOMATED: 0 PER 100 WBC
PDW BLD-RTO: 13.9 % (ref 11.8–14.4)
PLATELET # BLD: 262 K/UL (ref 138–453)
PMV BLD AUTO: 10 FL (ref 8.1–13.5)
POTASSIUM SERPL-SCNC: 4 MMOL/L (ref 3.7–5.3)
RBC # BLD: 3.56 M/UL (ref 3.95–5.11)
SEG NEUTROPHILS: 58 % (ref 36–65)
SEGMENTED NEUTROPHILS ABSOLUTE COUNT: 3.08 K/UL (ref 1.5–8.1)
SODIUM BLD-SCNC: 140 MMOL/L (ref 135–144)
WBC # BLD: 5.3 K/UL (ref 3.5–11.3)

## 2022-08-29 PROCEDURE — 1200000000 HC SEMI PRIVATE

## 2022-08-29 PROCEDURE — 85025 COMPLETE CBC W/AUTO DIFF WBC: CPT

## 2022-08-29 PROCEDURE — 6370000000 HC RX 637 (ALT 250 FOR IP): Performed by: REGISTERED NURSE

## 2022-08-29 PROCEDURE — 82947 ASSAY GLUCOSE BLOOD QUANT: CPT

## 2022-08-29 PROCEDURE — 36415 COLL VENOUS BLD VENIPUNCTURE: CPT

## 2022-08-29 PROCEDURE — 80048 BASIC METABOLIC PNL TOTAL CA: CPT

## 2022-08-29 PROCEDURE — 6370000000 HC RX 637 (ALT 250 FOR IP): Performed by: STUDENT IN AN ORGANIZED HEALTH CARE EDUCATION/TRAINING PROGRAM

## 2022-08-29 PROCEDURE — 6370000000 HC RX 637 (ALT 250 FOR IP): Performed by: NURSE PRACTITIONER

## 2022-08-29 PROCEDURE — 51798 US URINE CAPACITY MEASURE: CPT

## 2022-08-29 PROCEDURE — 6370000000 HC RX 637 (ALT 250 FOR IP): Performed by: PHYSICIAN ASSISTANT

## 2022-08-29 PROCEDURE — 94640 AIRWAY INHALATION TREATMENT: CPT

## 2022-08-29 PROCEDURE — APPSS30 APP SPLIT SHARED TIME 16-30 MINUTES: Performed by: REGISTERED NURSE

## 2022-08-29 PROCEDURE — 97129 THER IVNTJ 1ST 15 MIN: CPT

## 2022-08-29 PROCEDURE — 83735 ASSAY OF MAGNESIUM: CPT

## 2022-08-29 PROCEDURE — 2700000000 HC OXYGEN THERAPY PER DAY

## 2022-08-29 PROCEDURE — 2580000003 HC RX 258: Performed by: PHYSICIAN ASSISTANT

## 2022-08-29 PROCEDURE — 6360000002 HC RX W HCPCS: Performed by: STUDENT IN AN ORGANIZED HEALTH CARE EDUCATION/TRAINING PROGRAM

## 2022-08-29 PROCEDURE — 94761 N-INVAS EAR/PLS OXIMETRY MLT: CPT

## 2022-08-29 RX ORDER — IPRATROPIUM BROMIDE AND ALBUTEROL SULFATE 2.5; .5 MG/3ML; MG/3ML
3 SOLUTION RESPIRATORY (INHALATION) 2 TIMES DAILY
Status: DISCONTINUED | OUTPATIENT
Start: 2022-08-29 | End: 2022-08-29

## 2022-08-29 RX ORDER — FUROSEMIDE 20 MG/1
20 TABLET ORAL EVERY OTHER DAY
Status: DISCONTINUED | OUTPATIENT
Start: 2022-08-29 | End: 2022-08-30 | Stop reason: HOSPADM

## 2022-08-29 RX ORDER — ACETAMINOPHEN 500 MG
1000 TABLET ORAL EVERY 8 HOURS SCHEDULED
Status: DISCONTINUED | OUTPATIENT
Start: 2022-08-29 | End: 2022-08-30 | Stop reason: HOSPADM

## 2022-08-29 RX ORDER — ACETAMINOPHEN 325 MG/1
650 TABLET ORAL ONCE
Status: COMPLETED | OUTPATIENT
Start: 2022-08-29 | End: 2022-08-29

## 2022-08-29 RX ADMIN — AMLODIPINE BESYLATE 10 MG: 5 TABLET ORAL at 08:25

## 2022-08-29 RX ADMIN — METHOCARBAMOL TABLETS 750 MG: 750 TABLET, COATED ORAL at 08:25

## 2022-08-29 RX ADMIN — POTASSIUM CHLORIDE 20 MEQ: 40 SOLUTION ORAL at 08:25

## 2022-08-29 RX ADMIN — LEVETIRACETAM 1000 MG: 500 SOLUTION ORAL at 20:19

## 2022-08-29 RX ADMIN — IPRATROPIUM BROMIDE AND ALBUTEROL SULFATE 3 ML: .5; 3 SOLUTION RESPIRATORY (INHALATION) at 07:18

## 2022-08-29 RX ADMIN — ACETAMINOPHEN 650 MG: 325 TABLET ORAL at 17:50

## 2022-08-29 RX ADMIN — ENOXAPARIN SODIUM 30 MG: 100 INJECTION SUBCUTANEOUS at 08:25

## 2022-08-29 RX ADMIN — ACETAMINOPHEN 650 MG: 325 TABLET ORAL at 11:13

## 2022-08-29 RX ADMIN — OLANZAPINE 5 MG: 5 TABLET, FILM COATED ORAL at 20:25

## 2022-08-29 RX ADMIN — ENOXAPARIN SODIUM 30 MG: 100 INJECTION SUBCUTANEOUS at 20:19

## 2022-08-29 RX ADMIN — IPRATROPIUM BROMIDE AND ALBUTEROL SULFATE 3 ML: .5; 3 SOLUTION RESPIRATORY (INHALATION) at 11:24

## 2022-08-29 RX ADMIN — SODIUM CHLORIDE, PRESERVATIVE FREE 10 ML: 5 INJECTION INTRAVENOUS at 08:30

## 2022-08-29 RX ADMIN — SERTRALINE 50 MG: 50 TABLET, FILM COATED ORAL at 08:25

## 2022-08-29 RX ADMIN — FUROSEMIDE 20 MG: 20 TABLET ORAL at 14:59

## 2022-08-29 RX ADMIN — STANDARDIZED SENNA CONCENTRATE AND DOCUSATE SODIUM 1 TABLET: 8.6; 5 TABLET ORAL at 20:20

## 2022-08-29 RX ADMIN — ACETAMINOPHEN 1000 MG: 500 TABLET ORAL at 20:20

## 2022-08-29 RX ADMIN — LEVOTHYROXINE SODIUM 200 MCG: 125 TABLET ORAL at 08:25

## 2022-08-29 RX ADMIN — ACETAMINOPHEN 650 MG: 325 TABLET ORAL at 05:13

## 2022-08-29 RX ADMIN — PREGABALIN 300 MG: 100 CAPSULE ORAL at 08:24

## 2022-08-29 RX ADMIN — LEVETIRACETAM 1000 MG: 500 SOLUTION ORAL at 08:24

## 2022-08-29 RX ADMIN — IPRATROPIUM BROMIDE AND ALBUTEROL SULFATE 3 ML: .5; 3 SOLUTION RESPIRATORY (INHALATION) at 04:23

## 2022-08-29 RX ADMIN — PREGABALIN 300 MG: 100 CAPSULE ORAL at 20:19

## 2022-08-29 ASSESSMENT — PAIN DESCRIPTION - DESCRIPTORS
DESCRIPTORS: ACHING;DISCOMFORT;DULL
DESCRIPTORS: ACHING
DESCRIPTORS: ACHING;DISCOMFORT
DESCRIPTORS: DISCOMFORT;ACHING

## 2022-08-29 ASSESSMENT — PAIN SCALES - GENERAL
PAINLEVEL_OUTOF10: 6
PAINLEVEL_OUTOF10: 9
PAINLEVEL_OUTOF10: 7
PAINLEVEL_OUTOF10: 7
PAINLEVEL_OUTOF10: 5
PAINLEVEL_OUTOF10: 4
PAINLEVEL_OUTOF10: 4
PAINLEVEL_OUTOF10: 9

## 2022-08-29 ASSESSMENT — PAIN DESCRIPTION - LOCATION
LOCATION: HEAD

## 2022-08-29 ASSESSMENT — PAIN - FUNCTIONAL ASSESSMENT: PAIN_FUNCTIONAL_ASSESSMENT: PREVENTS OR INTERFERES SOME ACTIVE ACTIVITIES AND ADLS

## 2022-08-29 ASSESSMENT — PAIN DESCRIPTION - ORIENTATION: ORIENTATION: LEFT;ANTERIOR

## 2022-08-29 NOTE — CARE COORDINATION
SBIRT complete. Met with pt to complete assessment. Pt denies alcohol/drug use and also denies depression/SI. Patients  at bedside. States they have 4 children, two sons live in the area. Case management following to assist with SNF placement. Alcohol Screening and Brief Intervention        No results for input(s): ALC in the last 72 hours. Alcohol Pre-screening     (WOMEN ONLY) How many times in the past year have you had 4 or more drinks in a day?: None    Alcohol Screening Audit       Drug Pre-Screening   How many times in the past year have you used a recreational drug or used a prescription medication for nonmedical reasons?: None    Drug Screening DAST       Mood Pre-Screening (PHQ-2)  During the past two weeks, have you been bothered by little interest or pleasure in doing things?: No  During the past two weeks, have you been bothered by feeling down, depressed, or hopeless?: No    Mood Pre-Screening (PHQ-9)         I have interviewed Helene Doyle, [phone] regarding  Her alcohol consumption/drug use and risk for excessive use. Screenings were negative. Patient  N/A intervention at this time.      Deferred []    Completed on: 8/29/2022   [facility], [facility]

## 2022-08-29 NOTE — PLAN OF CARE
Problem: Respiratory - Adult  Goal: Achieves optimal ventilation and oxygenation  8/29/2022 0722 by Chris Mancilla RCP  Outcome: Progressing   PROVIDE ADEQUATE OXYGENATION WITH ACCEPTABLE SP02/ABG'S    [x]  IDENTIFY APPROPRIATE OXYGEN THERAPY  [x]   MONITOR SP02/ABG'S AS NEEDED   [x]   PATIENT EDUCATION AS NEEDED   BRONCHOSPASM/BRONCHOCONSTRICTION     [x]         IMPROVE AERATION/BREATH SOUNDS  [x]   ADMINISTER BRONCHODILATOR THERAPY AS APPROPRIATE  [x]   ASSESS BREATH SOUNDS  []   IMPLEMENT AEROSOL/MDI PROTOCOL  [x]   PATIENT EDUCATION AS NEEDED

## 2022-08-29 NOTE — PROGRESS NOTES
[facility]  PROGRESS NOTE    Room # 2002/2002-01   Name: Ana Lilia Vivar              Reason for visit: Routine    I visited the patient. Admit Date & Time: 8/22/2022  6:53 PM    Assessment:  Ana Lilia Vivar is a 69 y.o. female. Upon entering the room patient was sleeping. Intervention:   provided a ministry presence and brief prayer. Outcome:  Patient did not respond. Plan:  Chaplains will remain available to offer spiritual and emotional support as needed. Electronically signed by Oracio Medel.  Chaplain Malcolm, on 8/29/2022 at 10:48 AM.  [place]        08/29/22 1047   Encounter Summary   Service Provided For: Patient   Referral/Consult From: [facility]   Support System Unknown   Last Encounter  08/29/22  (PT: sleeping)   Complexity of Encounter Low   Begin Time 1015   End Time  1016   Total Time Calculated 1 min   Encounter    Type Initial Screen/Assessment   Assessment/Intervention/Outcome   Assessment Unable to assess   Intervention Prayer (assurance of)/[place];Sustaining Presence/Ministry of presence

## 2022-08-29 NOTE — PROGRESS NOTES
Comprehensive Nutrition Assessment    Type and Reason for Visit:  Reassess    Nutrition Recommendations/Plan:   Continue Immune Enhancing 250 ml bolus 5x/day   Monitor weight, labs and TF tolerance     Malnutrition Assessment:  Malnutrition Status:  Insufficient data (08/24/22 1340)    Context:  Acute Illness     Findings of the 6 clinical characteristics of malnutrition:  Energy Intake:  Unable to assess  Weight Loss:  No significant weight loss     Body Fat Loss:  Unable to assess     Muscle Mass Loss:  Unable to assess    Fluid Accumulation:  No significant fluid accumulation (per RN documentation)     Strength:  Not Performed    Nutrition Assessment:    NP changed TF to Bolus 250 ml 5x daily. Patient was previously tolerating Immune enhancing formula at 45 ml/hr continuous. Per speech therapy notes (8/28) plan to conduct swallow study soon. Nutrition Related Findings:    Labs reviewed. meds include glycolax, senna Wound Type: Surgical Incision (head)       Current Nutrition Intake & Therapies:    Average Meal Intake: NPO  Average Supplements Intake: NPO  Current Tube Feeding (TF) Orders:  Feeding Route: Nasogastric  Formula: Immune Enhancing  Schedule: Bolus  Feeding Regimen: 250 ml bolus 5x/day  Additives/Modulars:    Water Flushes: 100 ml after each bolus  Current TF & Flush Orders Provides: 1875 kcal, 117g pro  Goal TF & Flush Orders Provides: 45 mL/hr =1620 kcal and 101 g pro/day    Anthropometric Measures:  Height: 5' 5\" (165.1 cm)  Ideal Body Weight (IBW): 125 lbs (57 kg)    Admission Body Weight: 153 lb 14.1 oz (69.8 kg)  Current Body Weight: 166 lb 10.7 oz (75.6 kg), 123.1 % IBW. Weight Source: Bed Scale  Current BMI (kg/m2): 27.7  Usual Body Weight: 160 lb (72.6 kg) (5/2/22)  % Weight Change (Calculated): -3.8                    BMI Categories: Overweight (BMI 25.0-29. 9)    Estimated Daily Nutrient Needs:  Energy Requirements Based On: Kcal/kg  Weight Used for Energy Requirements: Admission  Energy (kcal/day): 1700 kcal/day  Weight Used for Protein Requirements: Current  Protein (g/day): 85 g pro/day  Method Used for Fluid Requirements: ml/Kg (28)  Fluid (ml/day): 2000 mL/day or per MD    Nutrition Diagnosis:   Inadequate oral intake related to acute injury/trauma as evidenced by NPO or clear liquid status due to medical condition, nutrition support - enteral nutrition    Nutrition Interventions:   Food and/or Nutrient Delivery: Continue Current Tube Feeding  Nutrition Education/Counseling: No recommendation at this time  Coordination of Nutrition Care: Continue to monitor while inpatient  Plan of Care discussed with: Patient and Family    Goals:  Previous Goal Met: Goal(s) Achieved  Goals: Meet at least 75% of estimated needs       Nutrition Monitoring and Evaluation:   Behavioral-Environmental Outcomes: None Identified  Food/Nutrient Intake Outcomes: Enteral Nutrition Intake/Tolerance, Diet Advancement/Tolerance  Physical Signs/Symptoms Outcomes: Biochemical Data, Chewing or Swallowing, Diarrhea, GI Status, Nausea or Vomiting, Fluid Status or Edema, Skin, Weight    Discharge Planning:     Too soon to determine     Mariam Arechiga, [address]  Contact: [phone] no

## 2022-08-29 NOTE — PROGRESS NOTES
Neurosurgery LAUREN/Resident    Daily Progress Note   Chief Complaint   Patient presents with    Trauma     8/29/2022  1:01 PM    Chart reviewed. No acute events overnight. No new complaints. Failed bedside swallow eval yesterday. Vitals:    08/29/22 0718 08/29/22 0730 08/29/22 1124 08/29/22 1140   BP:  (!) 145/68  (!) 120/90   Pulse: 84 69 77 79   Resp: 21 13 19 17   Temp:  98.2 °F (36.8 °C)  97.9 °F (36.6 °C)   TempSrc:  Oral  Oral   SpO2: 94% 96% 96% 96%   Weight:       Height:           PE:   Alert, oriented x3, dysarthric   Follow all commands  Motor   Moving all extremities   Antigravity to bilat UE and LE     Incision cranial site open to air, dry and clean      Lab Results   Component Value Date    WBC 5.3 08/29/2022    HGB 11.1 (L) 08/29/2022    HCT 34.9 (L) 08/29/2022     08/29/2022    CHOL 251 (H) 06/20/2022    TRIG 84 08/25/2022    HDL 77 06/20/2022    ALT 11 06/20/2022    AST 20 06/20/2022     08/29/2022    K 4.0 08/29/2022     08/29/2022    CREATININE 0.28 (L) 08/29/2022    BUN 14 08/29/2022    CO2 29 08/29/2022    TSH 11.11 (H) 06/20/2022    INR 1.0 08/24/2022    LABA1C 5.7 06/20/2022    CRP 34.7 (H) 01/18/2022    SEDRATE 5 07/30/2020         A/P  left sided SDH  POD#6 s/p left craniotomy for evacuation of SDH     - PT, OT and SLP, activity as tolerated    - continue HOB >30  - SCDs for dvt ppx and lovenox   - continue keppa at discharge  - continue to hold xeralto until followup with repeat CT head  - follow up 2 weeks for staple removal      Please contact neurosurgery with any changes in patients neurologic status.        Jyoti Patricio CNP  8/29/22  1:01 PM

## 2022-08-29 NOTE — PROGRESS NOTES
PROGRESS NOTE          PATIENT NAME: Trinidad Mcgrath  MEDICAL RECORD NO. 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     KEPPRA TO DC ON     Hx chronic HTN, afib on xarelto, CAD on asa  Norvasc 10mg  Lasix 20every other day     Hx of COPD, frequent oneumonia   Home inhalers add back    Dysphagia  Aspiration risk  Ng tube  Swallow reeval study later this week  Expectation is improvement in swallow- she can dc with ng tube with plan on wekly swallow study x 2 and if still not on oral diet-> G tube placement     Hx hypothyroid- -Synthroid 200mcg daily       Chief Complaint: \"Headache\"    SUBJECTIVE    [place] no events overnight , complaining today of headache, walking in room    OBJECTIVE  VITALS: Temp: Temp: 97.9 °F (36.6 °C)Temp  Av.1 °F (36.7 °C)  Min: 97.9 °F (36.6 °C)  Max: 98.4 °F (27.7 °C) BP Systolic (18IXV), AA , Min:120 , ATY:688   Diastolic (63TLK), TRZ:77, Min:58, Max:90   Pulse Pulse  Av.2  Min: 68  Max: 84 Resp Resp  Avg: 15.5  Min: 13  Max: 21 Pulse ox SpO2  Av.8 %  Min: 94 %  Max: 98 %      Physical Exam  HENT:      Head:      Comments:

## 2022-08-29 NOTE — CARE COORDINATION
Spoke to [facility] at [place] at [facility]. Clinical team is reviewing. Update given    1210 notified by [facility] that they are able to accept. Patient and family updated    [phone] spoke to [facility]. If patient fails swallow study, they are able to accept patient with an NG and a plan for repeat swallow study.  Patient can get swallow study done at [facility]. Notified her of pain pump and plan for nurse to refill on 9/6

## 2022-08-29 NOTE — PROGRESS NOTES
Speech Language Pathology  Speech Language Pathology  [address] [facility] [place]    Cognitive Treatment Note    Date: 2022  Patients Name: Grace Holder  MRN: [id number]  Diagnosis:   Patient Active Problem List   Diagnosis Code    Hypothyroidism E03.9    Major depressive disorder F32.9    Chronic midline low back pain without sciatica M54.50, G89.29    Iron deficiency anemia D50.9    COPD (chronic obstructive pulmonary disease) (HCC) J44.9    Biventricular congestive heart failure (HCC) I50.82    Anemia D64.9    New onset a-fib (HCC) I48.91    Pulmonary fibrosis (HCC) J84.10    Subdural hematoma ([facility] S06.5X9A    Fall as cause of accidental injury in home as place of occurrence, initial encounter W19. 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 Initially lethargic and required verbal cues to remain awake and alert to complete ST on this date.      Orientation:  independently    Recall: Immediate recall of 3 units: 1/3 increased to 3/3 with repetition, 0/3 increased to 3/3 with repetition   Immediate recall of 5 units: 4/5, did not increase with repetition   Delayed recall of 3 units: 0/3 increased to 3/3 with min-mod verbal cues, 3/3    Organization: Word Associations: 3/4 increased to 4/4 with max verbal cues              Verbal Sequencin/4 increased to 3/4 with mod-max verbal cues              Word Generation: 3/8 increased to 8/8 with min-max verbal cues    Problem Solving/Reasoning: Antonyms: 0/3 increased to 3/3 with min-mod verbal cues                Inductive Reasoning: 3/4 increased to 4/4 with min verbal cues                Task Insight: 2/3 increased to 3/3 with min verbal cues                Thought Flexibility: 0/3 increased to 3/3 with min verbal cues                Convergent Central Theme/Deductive Reasoning: NT      Other: Pt. Continues with mild-moderate dysarthria. Increased speech clarity with known context. Education provided re: compensatory strategies to increase speech intelligibility/clarity. Pt. Verbalized understanding. Demonstrated understanding in 3 syllable words with min verbal cues. Spoke with trauma nurse, OBDULIA to be completed 8/30. New goals are as follows:   1. Pt. Will recall 3-5 units with and without distractions with 90% accuracy. 2.  Pt. Will utilize memory compensatory strategies to aid in recall. 3.  Pt. Will complete verbal reasoning tasks with 90% accuracy. 4.  Pt. Will generate 4-5 members of a category with 90% accuracy. Plan:  [x] Continue ST services    [] Discharge from ST:      Discharge recommendations: []  Further therapy recommended at discharge. The patient should be able to tolerate at least 3 hours of therapy per day over 5 days or 15 hours over 7 days. [x] Further therapy recommended at discharge. [] No therapy recommended at discharge. Treatment completed by: Katie Bautista, SLP, M.A.  GWEN-SLP

## 2022-08-29 NOTE — PROGRESS NOTES
Called  [phone] re: pain pump Mira. Discussed her plan for dc. She is seen at [address] [place] [address] for pain pump r/t chronic back pain and is expected to need a refill on 9/6.   [facility] will plan on arranging outpatient visiting nurse to facility to fill this medication

## 2022-08-29 NOTE — PLAN OF CARE
Problem: Discharge Planning  Goal: Discharge to home or other facility with appropriate resources  Outcome: Progressing  Flowsheets (Taken 8/28/2022 2000)  Discharge to home or other facility with appropriate resources:   Identify barriers to discharge with patient and caregiver   Arrange for needed discharge resources and transportation as appropriate     Problem: Pain  Goal: Verbalizes/displays adequate comfort level or baseline comfort level  Outcome: Progressing     Problem: Skin/Tissue Integrity  Goal: Absence of new skin breakdown  Description: 1. Monitor for areas of redness and/or skin breakdown  2. Assess vascular access sites hourly  3. Every 4-6 hours minimum:  Change oxygen saturation probe site  4. Every 4-6 hours:  If on nasal continuous positive airway pressure, respiratory therapy assess nares and determine need for appliance change or resting period.   Outcome: Progressing     Problem: Safety - Adult  Goal: Free from fall injury  Outcome: Progressing     Problem: ABCDS Injury Assessment  Goal: Absence of physical injury  Outcome: Progressing     Problem: Chronic Conditions and Co-morbidities  Goal: Patient's chronic conditions and co-morbidity symptoms are monitored and maintained or improved  Outcome: Progressing  Flowsheets (Taken 8/28/2022 2000)  Care Plan - Patient's Chronic Conditions and Co-Morbidity Symptoms are Monitored and Maintained or Improved:   Monitor and assess patient's chronic conditions and comorbid symptoms for stability, deterioration, or improvement   Collaborate with multidisciplinary team to address chronic and comorbid conditions and prevent exacerbation or deterioration     Problem: Nutrition Deficit:  Goal: Optimize nutritional status  Outcome: Progressing     Problem: Respiratory - Adult  Goal: Achieves optimal ventilation and oxygenation  Outcome: Progressing

## 2022-08-30 ENCOUNTER — APPOINTMENT (OUTPATIENT)
Dept: GENERAL RADIOLOGY | Age: 77
DRG: 025 | End: 2022-08-30
Payer: MEDICARE

## 2022-08-30 VITALS
BODY MASS INDEX: 26.19 KG/M2 | HEART RATE: 68 BPM | RESPIRATION RATE: 18 BRPM | WEIGHT: 157.19 LBS | HEIGHT: 65 IN | DIASTOLIC BLOOD PRESSURE: 80 MMHG | TEMPERATURE: 97.9 F | OXYGEN SATURATION: 95 % | SYSTOLIC BLOOD PRESSURE: 157 MMHG

## 2022-08-30 LAB
SARS-COV-2, RAPID: NOT DETECTED
SPECIMEN DESCRIPTION: NORMAL

## 2022-08-30 PROCEDURE — 92526 ORAL FUNCTION THERAPY: CPT

## 2022-08-30 PROCEDURE — 97116 GAIT TRAINING THERAPY: CPT

## 2022-08-30 PROCEDURE — 6360000002 HC RX W HCPCS: Performed by: STUDENT IN AN ORGANIZED HEALTH CARE EDUCATION/TRAINING PROGRAM

## 2022-08-30 PROCEDURE — 6370000000 HC RX 637 (ALT 250 FOR IP): Performed by: NURSE PRACTITIONER

## 2022-08-30 PROCEDURE — 87635 SARS-COV-2 COVID-19 AMP PRB: CPT

## 2022-08-30 PROCEDURE — 94761 N-INVAS EAR/PLS OXIMETRY MLT: CPT

## 2022-08-30 PROCEDURE — 6370000000 HC RX 637 (ALT 250 FOR IP): Performed by: PHYSICIAN ASSISTANT

## 2022-08-30 PROCEDURE — 97530 THERAPEUTIC ACTIVITIES: CPT

## 2022-08-30 PROCEDURE — 94640 AIRWAY INHALATION TREATMENT: CPT

## 2022-08-30 PROCEDURE — 74230 X-RAY XM SWLNG FUNCJ C+: CPT

## 2022-08-30 PROCEDURE — 6370000000 HC RX 637 (ALT 250 FOR IP): Performed by: STUDENT IN AN ORGANIZED HEALTH CARE EDUCATION/TRAINING PROGRAM

## 2022-08-30 PROCEDURE — 92611 MOTION FLUOROSCOPY/SWALLOW: CPT

## 2022-08-30 PROCEDURE — 2700000000 HC OXYGEN THERAPY PER DAY

## 2022-08-30 PROCEDURE — 97110 THERAPEUTIC EXERCISES: CPT

## 2022-08-30 PROCEDURE — 6370000000 HC RX 637 (ALT 250 FOR IP): Performed by: REGISTERED NURSE

## 2022-08-30 RX ORDER — AMLODIPINE BESYLATE 10 MG/1
10 TABLET ORAL DAILY
Qty: 30 TABLET | Refills: 3 | DISCHARGE
Start: 2022-08-31

## 2022-08-30 RX ORDER — DULOXETIN HYDROCHLORIDE 60 MG/1
60 CAPSULE, DELAYED RELEASE ORAL DAILY
COMMUNITY

## 2022-08-30 RX ORDER — POTASSIUM CHLORIDE 1.5 G/1.77G
20 POWDER, FOR SOLUTION ORAL 2 TIMES DAILY
COMMUNITY

## 2022-08-30 RX ORDER — HYDROXYCHLOROQUINE SULFATE 200 MG/1
300 TABLET, FILM COATED ORAL DAILY
COMMUNITY

## 2022-08-30 RX ORDER — TIZANIDINE 2 MG/1
2 TABLET ORAL EVERY 8 HOURS PRN
DISCHARGE
Start: 2022-08-30 | End: 2022-09-06

## 2022-08-30 RX ORDER — IBUPROFEN 800 MG/1
400 TABLET ORAL EVERY 6 HOURS PRN
Status: DISCONTINUED | OUTPATIENT
Start: 2022-08-30 | End: 2022-08-30 | Stop reason: HOSPADM

## 2022-08-30 RX ORDER — CYANOCOBALAMIN (VITAMIN B-12) 1000 MCG
1 TABLET, EXTENDED RELEASE ORAL 2 TIMES DAILY WITH MEALS
COMMUNITY

## 2022-08-30 RX ORDER — IBUPROFEN 400 MG/1
400 TABLET ORAL EVERY 6 HOURS PRN
Qty: 120 TABLET | Refills: 3 | COMMUNITY
Start: 2022-08-30

## 2022-08-30 RX ORDER — LEVETIRACETAM 100 MG/ML
1000 SOLUTION ORAL 2 TIMES DAILY
Refills: 3 | DISCHARGE
Start: 2022-08-30

## 2022-08-30 RX ORDER — ENOXAPARIN SODIUM 100 MG/ML
30 INJECTION SUBCUTANEOUS 2 TIMES DAILY
Qty: 8.4 ML | Refills: 0 | DISCHARGE
Start: 2022-08-30 | End: 2022-09-13

## 2022-08-30 RX ORDER — METHOCARBAMOL 500 MG/1
500 TABLET, FILM COATED ORAL 3 TIMES DAILY
Status: DISCONTINUED | OUTPATIENT
Start: 2022-08-30 | End: 2022-08-30 | Stop reason: HOSPADM

## 2022-08-30 RX ORDER — TRAZODONE HYDROCHLORIDE 100 MG/1
200 TABLET ORAL NIGHTLY
COMMUNITY

## 2022-08-30 RX ADMIN — PREGABALIN 300 MG: 100 CAPSULE ORAL at 08:40

## 2022-08-30 RX ADMIN — ACETAMINOPHEN 1000 MG: 500 TABLET ORAL at 04:29

## 2022-08-30 RX ADMIN — ENOXAPARIN SODIUM 30 MG: 100 INJECTION SUBCUTANEOUS at 08:41

## 2022-08-30 RX ADMIN — IBUPROFEN 400 MG: 800 TABLET, FILM COATED ORAL at 17:55

## 2022-08-30 RX ADMIN — POTASSIUM CHLORIDE 20 MEQ: 40 SOLUTION ORAL at 08:41

## 2022-08-30 RX ADMIN — SERTRALINE 50 MG: 50 TABLET, FILM COATED ORAL at 08:40

## 2022-08-30 RX ADMIN — IBUPROFEN 400 MG: 800 TABLET, FILM COATED ORAL at 11:41

## 2022-08-30 RX ADMIN — METHOCARBAMOL TABLETS 500 MG: 500 TABLET, COATED ORAL at 14:19

## 2022-08-30 RX ADMIN — METHOCARBAMOL TABLETS 500 MG: 500 TABLET, COATED ORAL at 08:41

## 2022-08-30 RX ADMIN — LEVOTHYROXINE SODIUM 200 MCG: 125 TABLET ORAL at 08:41

## 2022-08-30 RX ADMIN — METHOCARBAMOL TABLETS 500 MG: 500 TABLET, COATED ORAL at 01:36

## 2022-08-30 RX ADMIN — LEVETIRACETAM 1000 MG: 500 SOLUTION ORAL at 08:41

## 2022-08-30 RX ADMIN — TIOTROPIUM BROMIDE AND OLODATEROL 2 PUFF: 3.124; 2.736 SPRAY, METERED RESPIRATORY (INHALATION) at 07:27

## 2022-08-30 RX ADMIN — ACETAMINOPHEN 1000 MG: 500 TABLET ORAL at 14:25

## 2022-08-30 RX ADMIN — AMLODIPINE BESYLATE 10 MG: 5 TABLET ORAL at 08:41

## 2022-08-30 ASSESSMENT — PAIN SCALES - GENERAL
PAINLEVEL_OUTOF10: 10
PAINLEVEL_OUTOF10: 8
PAINLEVEL_OUTOF10: 8
PAINLEVEL_OUTOF10: 9
PAINLEVEL_OUTOF10: 7
PAINLEVEL_OUTOF10: 10

## 2022-08-30 ASSESSMENT — PAIN DESCRIPTION - ORIENTATION
ORIENTATION: LEFT
ORIENTATION: LEFT;MID
ORIENTATION: MID

## 2022-08-30 ASSESSMENT — PAIN DESCRIPTION - DESCRIPTORS
DESCRIPTORS: ACHING;DISCOMFORT
DESCRIPTORS: ACHING;SORE;PRESSURE
DESCRIPTORS: ACHING;DULL

## 2022-08-30 ASSESSMENT — PAIN DESCRIPTION - LOCATION
LOCATION: HEAD

## 2022-08-30 NOTE — PROGRESS NOTES
PROGRESS NOTE          PATIENT NAME: [facility] RECORD NO. 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     KEPPRA TO DC ON  Fu op nsx  Has headaches which are a bothersome- if motrin ineffective consideration can be made for a steroid but hoping to avoid this     Hx chronic HTN, afib on xarelto, CAD on asa  Norvasc 10mg  Lasix 20every other day     Hx of COPD, frequent oneumonia   Home inhalers add back    Dysphagia  Aspiration risk  Ng tube  Swallow reeval today   Diet per swallow study/speech recs  Expectation is improvement in swallow- she can dc with ng tube with plan on wekly swallow study x 2 and if still not on oral diet-> G tube placement     Hx hypothyroid- -Synthroid 200mcg daily       Chief Complaint: \"Headache\"    SUBJECTIVE    [place] no events overnight , complaining today of headache, walking in room    OBJECTIVE  VITALS: Temp: Temp: 98 °F (36.7 °C)Temp  Av °F (36.7 °C)  Min: 97.7 °F (36.5 °C)  Max: 98.2 °F (99.0 °C) BP Systolic (17PYS), EAV:287 , Min:120 , KVW:842   Diastolic (65YXI), VFU:66, Min:67, Max:90   Pulse Pulse  Av.6 Min: 59  Max: 79 Resp Resp  Av.1  Min: 13  Max: 19 Pulse ox SpO2  Av.3 %  Min: 92 %  Max: 98 %      Physical Exam  HENT:      Head:      Comments: Craniotomy staples, shaved head, evolving bruises     Mouth/Throat:      Mouth: Mucous membranes are dry. Eyes:      Extraocular Movements: Extraocular movements intact. Cardiovascular:      Rate and Rhythm: Normal rate. Pulses: Normal pulses. Pulmonary:      Effort: Pulmonary effort is normal.   Abdominal:      Palpations: Abdomen is soft. Musculoskeletal:         General: Normal range of motion. Skin:     General: Skin is warm. Capillary Refill: Capillary refill takes less than 2 seconds. Neurological:      General: No focal deficit present. Mental Status: She is alert. I/O last 3 completed shifts: In: 867 [NG/GT:867]  Out: 2200 [Urine:2200]    Drain/tube output: In: 100 [NG/GT:100]  Out: 1300 [Urine:1300]    LAB:  CBC:   Recent Labs     22  0456 22  0622   WBC 4.9 5.3   HGB 11.0* 11.1*   HCT 33.7* 34.9*   MCV 98.5 98.0   PLT See Reflexed IPF Result 262       BMP:   Recent Labs     22  0456 22  0622    140   K 3.6* 4.0    102   CO2 24 29   BUN 12 14   CREATININE 0.27* 0.28*   GLUCOSE 100* 102*       COAGS: No results for input(s): APTT, PROT, INR in the last 72 hours.     RADIOLOGY:  No new imaging

## 2022-08-30 NOTE — PROGRESS NOTES
Called spouse to let him know wife picked up by [facility] and heading to [place] in [place]

## 2022-08-30 NOTE — PROCEDURES
INSTRUMENTAL SWALLOW REPORT  MODIFIED BARIUM SWALLOW    NAME: Olivia Herrmann   : 1945  MRN: [id number]       Date of Eval: 2022              Referring Diagnosis(es):      Past Medical History:  has a past medical history of A-fib ([address] Biventricular congestive heart failure ([address] Bronchiectasis with acute exacerbation ([address] CAD (coronary artery disease), Chronic pain syndrome, COPD (chronic obstructive pulmonary disease) ([address] Depression, GERD (gastroesophageal reflux disease), Hypothyroidism, Impaired fasting glucose, Iron deficiency anemia, Osteoporosis, Pulmonary fibrosis ([address] and Subdural hematoma ([address] Past Surgical History:  has a past surgical history that includes Hysterectomy (1991); Carpal tunnel release (Right, 1999); Total knee arthroplasty (Right, 2021); fracture surgery (Left, 2018); Wrist fracture surgery (Left, 2018); lumbar discectomy (1993); Lumbar disc surgery (02/15/1994); back surgery (1998); back surgery (1999); Carpal tunnel release (Left, 1999); Neck surgery (2002); Carpal tunnel release (Right, 2002); Carpal tunnel release (Left, 2003); back surgery (10/23/2003); back surgery (10/23/2003); Cervical disc surgery (10/25/2004); Cervical disc surgery (2004); Neck surgery (2005); Toe amputation (10/08/2006); Toe amputation (2008); lumbar laminectomy (2008); Toe amputation (10/03/2008); Humerus fracture surgery (Right, 10/03/2010); Knee arthroscopy (Right, 2011); back surgery (2017); knee surgery (Right, 2016); Wrist fracture surgery (Left, 2018); Wrist surgery (Left, 2019); and craniotomy (Left, 2022).                Type of Study: Initial MBS      Patient Complaints/Reason for Referral:  Olivia Herrmann was referred for a MBS to assess the efficiency of his/her swallow function, assess for aspiration, and to make recommendations Education Response: Verbalizes understanding    Safety Devices  Restraints Initially in Place: No      Goals:    Long Term:    To Maximize safety with intake, optimize nutrition/hydration and minimize risk for aspiration.        Short Term:     Goal 1: O/M treatment program for dysphagia      Oral Preparation / Oral Phase: WFL     Adequate mastication and oral manipulation for consistencies tested, no oral loss, no oral stasis      Pharyngeal Phase: Impaired     Puree: no initial penetration with + penetration and + silent aspiration after swallow from moderate vallec stasis, no cough  Soft Solids: + penetration + silent aspiration mod vallec stasis  Nectar: Increased penetration and increased silent aspiration during and after swallow  + cough    Esophageal Phase  Esophageal Screen: Impaired  Upper Esophageal Screen- Major Contributing Deficits  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

## 2022-08-30 NOTE — PROGRESS NOTES
Speech Language Pathology  Speech Language Pathology  [facility]    Cognitive Treatment Note    Date: 8/30/2022  Patients Name: Dulce Ojeda  MRN: [id number]  Diagnosis:   Patient Active Problem List   Diagnosis Code    Hypothyroidism E03.9    Major depressive disorder F32.9    Chronic midline low back pain without sciatica M54.50, G89.29    Iron deficiency anemia D50.9    COPD (chronic obstructive pulmonary disease) ([facility]) J44.9    Biventricular congestive heart failure (HCC) I50.82    Anemia D64.9    New onset a-fib ([facility]) I48.91    Pulmonary fibrosis ([facility]) J84.10    Subdural hematoma ([facility] S06.5X9A    Fall as cause of accidental injury in home as place of occurrence, initial encounter W19. 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 Awake and alert sitting up    Orientation: 6/6 independently    Recall: Recall of daily events 100%    Swallowing:  Education provided re: results of MBSS and silent aspiration. Education provided re: ST treatment for dysphagia and outcomes. Pt. Verbalized understanding. Other: Pt. With min-mild dysarthria. Speech 100% intelligible without context. Plan:  [x] Continue ST services    [] Discharge from ST:      Discharge recommendations: []  Further therapy recommended at discharge. The patient should be able to tolerate at least 3 hours of therapy per day over 5 days or 15 hours over 7 days. [x] Further therapy recommended at discharge. [] No therapy recommended at discharge. Treatment completed by: Katheryne Dakin, SLP, M.A.  CCC-SLP

## 2022-08-30 NOTE — PROGRESS NOTES
SPIRITUAL CARE DEPARTMENT - [facility]  PROGRESS NOTE    Shift date: 8/29/2022  Shift day: Monday  Shift # 3    Room # 2002/2002-01   Name: Galina Dias                Jew: Restorationist   Place of Quaker: Unknown    Referral: Routine Visit    Admit Date & Time: 8/22/2022  6:53 PM    Assessment:  Galina Dias is a 69 y.o. female in the hospital because of a \"Subdural Hematoma. \" Per bedside nurse, patient had a \"Fall,\" and underwent \"major brain surgery. \" Patient was sitting up in recliner, beside hospital bed, when  visited. Intervention:  Writer introduced self and title as . Patient woke and was able to communicate with  during visit. Patient requested water, however, she has not yet passed a \"swallow test.\"  assisted patient with some mouth swabs, per nurse approval. Patient confirmed that she has good support in her  and four children, who all come to visit her in the hospital. Patient complained of \"throat pain and wanting the NG tube out. \"  provided support, encouragement and hospitality to patient during visit. Outcome:  Patient thanked  for visit. Plan:  Chaplains will remain available to offer spiritual and emotional support as needed. 08/29/22 2158   Encounter Summary   Service Provided For: Patient   Referral/Consult From: [employer] Spouse; Children   Last Encounter  08/29/22   Complexity of Encounter Low   Begin Time 2158   End Time  2212   Total Time Calculated 14 min   Encounter    Type Initial Screen/Assessment   Spiritual/Emotional needs   Type Spiritual Support   Assessment/Intervention/Outcome   Assessment Calm;Coping   Intervention Active listening;Discussed illness injury and its impact; Explored/Affirmed feelings, thoughts, concerns;Explored Coping Skills/Resources;Nurtured Hope;Sustaining Presence/Ministry of presence   Outcome Comfort;Coping;Receptive   Plan and Referrals   Plan/Referrals Continue to

## 2022-08-30 NOTE — PLAN OF CARE
Problem: Discharge Planning  Goal: Discharge to home or other facility with appropriate resources  Outcome: Progressing  Flowsheets  Taken 8/29/2022 2000  Discharge to home or other facility with appropriate resources: Identify barriers to discharge with patient and caregiver  Taken 8/29/2022 1600  Discharge to home or other facility with appropriate resources: Identify barriers to discharge with patient and caregiver     Problem: Pain  Goal: Verbalizes/displays adequate comfort level or baseline comfort level  Outcome: Progressing     Problem: Skin/Tissue Integrity  Goal: Absence of new skin breakdown  Description: 1. Monitor for areas of redness and/or skin breakdown  2. Assess vascular access sites hourly  3. Every 4-6 hours minimum:  Change oxygen saturation probe site  4. Every 4-6 hours:  If on nasal continuous positive airway pressure, respiratory therapy assess nares and determine need for appliance change or resting period.   Outcome: Progressing     Problem: Safety - Adult  Goal: Free from fall injury  Outcome: Progressing  Flowsheets (Taken 8/29/2022 2000)  Free From Fall Injury: Instruct family/caregiver on patient safety     Problem: ABCDS Injury Assessment  Goal: Absence of physical injury  Outcome: Progressing  Flowsheets (Taken 8/29/2022 2000)  Absence of Physical Injury: Implement safety measures based on patient assessment     Problem: Chronic Conditions and Co-morbidities  Goal: Patient's chronic conditions and co-morbidity symptoms are monitored and maintained or improved  Outcome: Progressing  Flowsheets  Taken 8/29/2022 2000  Care Plan - Patient's Chronic Conditions and Co-Morbidity Symptoms are Monitored and Maintained or Improved: Monitor and assess patient's chronic conditions and comorbid symptoms for stability, deterioration, or improvement  Taken 8/29/2022 1600  Care Plan - Patient's Chronic Conditions and Co-Morbidity Symptoms are Monitored and Maintained or Improved: Monitor and assess patient's chronic conditions and comorbid symptoms for stability, deterioration, or improvement     Problem: Nutrition Deficit:  Goal: Optimize nutritional status  Outcome: Progressing  Flowsheets (Taken 8/29/2022 1301 by Mariam Arechiga RD)  Nutrient intake appropriate for improving, restoring, or maintaining nutritional needs:   Assess nutritional status and recommend course of action   Recommend, monitor, and adjust tube feedings and TPN/PPN based on assessed needs     Problem: Respiratory - Adult  Goal: Achieves optimal ventilation and oxygenation  Outcome: Progressing  Flowsheets (Taken 8/29/2022 2000)  Achieves optimal ventilation and oxygenation: Assess for changes in respiratory status

## 2022-08-30 NOTE — PROGRESS NOTES
Report called to the [facility] in [facility], all questions answered

## 2022-08-30 NOTE — PROGRESS NOTES
Physical Therapy  Facility/Department: [facility] CAR 2- STEPDOWN  Daily treatment note    Name: Michael Diaz  : 1945  MRN: [id number]  Date of Service: 2022    Discharge Recommendations:  Patient would benefit from continued therapy after discharge   PT Equipment Recommendations  Equipment Needed: Yes  Mobility Devices: [place]: Rolling      Patient Diagnosis(es): The encounter diagnosis was Subdural hematoma ([address] Past Medical History:  has a past medical history of A-fib ([address] Biventricular congestive heart failure ([address] Bronchiectasis with acute exacerbation ([address] CAD (coronary artery disease), Chronic pain syndrome, COPD (chronic obstructive pulmonary disease) ([address] Depression, GERD (gastroesophageal reflux disease), Hypothyroidism, Impaired fasting glucose, Iron deficiency anemia, Osteoporosis, Pulmonary fibrosis ([address] and Subdural hematoma ([address] Past Surgical History:  has a past surgical history that includes Hysterectomy (1991); Carpal tunnel release (Right, 1999); Total knee arthroplasty (Right, 2021); fracture surgery (Left, 2018); Wrist fracture surgery (Left, 2018); lumbar discectomy (1993); Lumbar disc surgery (02/15/1994); back surgery (1998); back surgery (1999); Carpal tunnel release (Left, 1999); Neck surgery (2002); Carpal tunnel release (Right, 2002); Carpal tunnel release (Left, 2003); back surgery (10/23/2003); back surgery (10/23/2003); Cervical disc surgery (10/25/2004); Cervical disc surgery (2004); Neck surgery (2005); Toe amputation (10/08/2006); Toe amputation (2008); lumbar laminectomy (2008); Toe amputation (10/03/2008); Humerus fracture surgery (Right, 10/03/2010); Knee arthroscopy (Right, 2011); back surgery (2017); knee surgery (Right, 2016); Wrist fracture surgery (Left, 2018);  Wrist surgery (Left, 2019); and craniotomy (Left, 8/23/2022). Assessment   Body Structures, Functions, Activity Limitations Requiring Skilled Therapeutic Intervention: Decreased functional mobility ; Decreased cognition;Decreased coordination;Decreased endurance;Decreased ROM; Decreased balance; Increased pain;Decreased strength;Decreased fine motor control  Assessment: The pt demo progress with mobility this date . Anle to amb 10ft x2 abd 30ft with RW MIN A for safety , limited by headache and decrease endurance. Recomending  continue therapy to address deficits. Therapy Prognosis: Fair  Requires PT Follow-Up: Yes  Activity Tolerance  Activity Tolerance: Patient limited by fatigue;Patient limited by endurance; Patient limited by pain  Activity Tolerance Comments: 10/10 headache     Plan   Plan  Plan: 5-7 times per week  Current Treatment Recommendations: Strengthening, ROM, Balance training, Gait training, Equipment evaluation, education, & procurement, Transfer training, Endurance training, Patient/Caregiver education & training, Safety education & training, Therapeutic activities  Safety Devices  Type of Devices: Nurse notified, Call light within reach, Left in chair, All fall risk precautions in place, Chair alarm in place, Gait belt, Patient at risk for falls  Restraints  Restraints Initially in Place: No     Restrictions  Restrictions/Precautions  Restrictions/Precautions: Fall Risk, Surgical Protocols, Up as Tolerated, Aspiration Risk, Contact Precautions  Required Braces or Orthoses?: No  Position Activity Restriction  Other position/activity restrictions: Up with assist, -140, s/p L craniotomy for SDH evacuation; HOB >30 degrees     Subjective   General  Patient assessed for rehabilitation services?: Yes  Response To Previous Treatment: Patient with no complaints from previous session. Family / Caregiver Present: Yes (multiple family members)  Follows Commands: Within Functional Limits  Subjective  Subjective: RN and pt agreeable to PT.  Pt up in chair with family in room upon arrival , c/o 10/10 pain head. [place] nd cooperative t/o. Pt requested assist to use rest room. Cognition   Orientation  Overall Orientation Status: Within Normal Limits  Orientation Level: Oriented X4  Cognition  Overall Cognitive Status: Exceptions  Arousal/Alertness: Appropriate responses to stimuli  Following Commands: Follows multistep commands with increased time; Follows multistep commands with repitition  Attention Span: Attends with cues to redirect  Memory: Appears intact  Safety Judgement: Decreased awareness of need for assistance  Problem Solving: Assistance required to generate solutions;Assistance required to identify errors made  Insights: Decreased awareness of deficits  Initiation: Requires cues for some  Sequencing: Requires cues for some     Objective   Bed mobility  Supine to Sit: Minimal assistance  Sit to Supine: Minimal assistance  Scooting: Contact guard assistance  Bed Mobility Comments: Pt up in chair upon arrivala nd exit. Transfers  Sit to Stand: Minimal Assistance  Bed to Chair: Minimal assistance  Comment: STS x2 with RW fro Recliner and toilet seat. Ambulation  Device: Rolling Walker  Assistance: Minimal assistance  Quality of Gait: unsteady with Mild posterior lean. Gait Deviations: Slow Helen;Decreased step length;Decreased step height;Shuffles  Distance: 10ft x2 60ft  Comments: Limited by head ache. and decrease endurance. More Ambulation?: No  Stairs/Curb  Stairs?: No     Balance  Posture: Fair  Sitting - Static: Fair;+  Sitting - Dynamic: Fair  Standing - Static: Fair;-  Standing - Dynamic: Poor;+;-  Comments: Standing with RW  Exercise Treatment:Seated LE exercise program: Long Arc Quads, hip abduction/adduction, heel/toe raises, and marches.  Reps: x10    AM-PAC Score  AM-PAC Inpatient Mobility Raw Score : 16 (08/30/22 1041)  AM-PAC Inpatient T-Scale Score : 40.78 (08/30/22 1041)  Mobility Inpatient CMS 0-100% Score: 54.16 (08/30/22 1041)  Mobility Inpatient CMS G-Code Modifier : CK (08/30/22 1041)   Goals  Short Term Goals  Time Frame for Short term goals: 14  Short term goal 1: Pt to perform bed mobility Nellie  Short term goal 2: Demonstrate functional transfers Nellie  Short term goal 3: Pt to ambulate 50ft w/ RW Nellie  Short term goal 4: Actively participate in 30 minutes of therapy to demo increased endurance  Patient Goals   Patient goals : Unable to state       Education  Patient Education  Education Given To: Patient; Family  Education Provided: Role of Therapy;Plan of Care;Transfer Training;Home Exercise Program  Education Method: Demonstration;Verbal  Barriers to Learning: None  Education Outcome: Verbalized understanding; Unable to verbalize;Continued education needed      Therapy Time   Individual Concurrent Group Co-treatment   Time In 1024         Time Out 1104         Minutes 40         Timed Code Treatment Minutes: [address]

## 2022-09-02 ENCOUNTER — APPOINTMENT (OUTPATIENT)
Dept: GENERAL RADIOLOGY | Age: 77
End: 2022-09-02
Payer: MEDICARE

## 2022-09-02 ENCOUNTER — HOSPITAL ENCOUNTER (EMERGENCY)
Age: 77
Discharge: ANOTHER HOSPITAL | End: 2022-09-03
Attending: EMERGENCY MEDICINE
Payer: MEDICARE

## 2022-09-02 DIAGNOSIS — Z46.59 ENCOUNTER FOR NASOGASTRIC (NG) TUBE PLACEMENT: ICD-10-CM

## 2022-09-02 DIAGNOSIS — R13.10 DYSPHAGIA, UNSPECIFIED TYPE: Primary | ICD-10-CM

## 2022-09-02 PROCEDURE — 71045 X-RAY EXAM CHEST 1 VIEW: CPT

## 2022-09-02 PROCEDURE — 99285 EMERGENCY DEPT VISIT HI MDM: CPT

## 2022-09-02 PROCEDURE — 74019 RADEX ABDOMEN 2 VIEWS: CPT

## 2022-09-02 PROCEDURE — 74018 RADEX ABDOMEN 1 VIEW: CPT

## 2022-09-02 PROCEDURE — 6360000002 HC RX W HCPCS: Performed by: EMERGENCY MEDICINE

## 2022-09-02 PROCEDURE — 96372 THER/PROPH/DIAG INJ SC/IM: CPT

## 2022-09-02 PROCEDURE — 96374 THER/PROPH/DIAG INJ IV PUSH: CPT

## 2022-09-02 PROCEDURE — 94664 DEMO&/EVAL PT USE INHALER: CPT

## 2022-09-02 PROCEDURE — 2500000003 HC RX 250 WO HCPCS: Performed by: EMERGENCY MEDICINE

## 2022-09-02 RX ORDER — LIDOCAINE HYDROCHLORIDE 20 MG/ML
5 INJECTION, SOLUTION EPIDURAL; INFILTRATION; INTRACAUDAL; PERINEURAL ONCE
Status: COMPLETED | OUTPATIENT
Start: 2022-09-02 | End: 2022-09-02

## 2022-09-02 RX ORDER — LIDOCAINE HYDROCHLORIDE 20 MG/ML
5 INJECTION, SOLUTION EPIDURAL; INFILTRATION; INTRACAUDAL; PERINEURAL ONCE
Status: COMPLETED | OUTPATIENT
Start: 2022-09-03 | End: 2022-09-02

## 2022-09-02 RX ORDER — METOCLOPRAMIDE HYDROCHLORIDE 5 MG/ML
10 INJECTION INTRAMUSCULAR; INTRAVENOUS ONCE
Status: COMPLETED | OUTPATIENT
Start: 2022-09-03 | End: 2022-09-02

## 2022-09-02 RX ORDER — LORAZEPAM 2 MG/ML
1 INJECTION INTRAMUSCULAR ONCE
Status: COMPLETED | OUTPATIENT
Start: 2022-09-02 | End: 2022-09-02

## 2022-09-02 RX ADMIN — LIDOCAINE HYDROCHLORIDE 5 ML: 20 INJECTION, SOLUTION EPIDURAL; INFILTRATION; INTRACAUDAL; PERINEURAL at 23:58

## 2022-09-02 RX ADMIN — LORAZEPAM 1 MG: 2 INJECTION, SOLUTION INTRAMUSCULAR; INTRAVENOUS at 21:59

## 2022-09-02 RX ADMIN — METOCLOPRAMIDE 10 MG: 5 INJECTION, SOLUTION INTRAMUSCULAR; INTRAVENOUS at 23:56

## 2022-09-02 RX ADMIN — LIDOCAINE HYDROCHLORIDE 5 ML: 20 INJECTION, SOLUTION EPIDURAL; INFILTRATION; INTRACAUDAL; PERINEURAL at 22:46

## 2022-09-03 ENCOUNTER — HOSPITAL ENCOUNTER (EMERGENCY)
Age: 77
Discharge: HOME | End: 2022-09-03
Attending: EMERGENCY MEDICINE
Payer: MEDICARE

## 2022-09-03 ENCOUNTER — APPOINTMENT (OUTPATIENT)
Dept: INTERVENTIONAL RADIOLOGY/VASCULAR | Age: 77
End: 2022-09-03
Payer: MEDICARE

## 2022-09-03 VITALS
SYSTOLIC BLOOD PRESSURE: 134 MMHG | HEART RATE: 71 BPM | OXYGEN SATURATION: 96 % | RESPIRATION RATE: 17 BRPM | DIASTOLIC BLOOD PRESSURE: 50 MMHG | TEMPERATURE: 98.1 F

## 2022-09-03 VITALS
TEMPERATURE: 97.6 F | RESPIRATION RATE: 16 BRPM | DIASTOLIC BLOOD PRESSURE: 87 MMHG | HEART RATE: 66 BPM | WEIGHT: 157 LBS | BODY MASS INDEX: 26.16 KG/M2 | HEIGHT: 65 IN | SYSTOLIC BLOOD PRESSURE: 140 MMHG | OXYGEN SATURATION: 97 %

## 2022-09-03 DIAGNOSIS — Z46.59 ENCOUNTER FOR NASOGASTRIC (NG) TUBE PLACEMENT: Primary | ICD-10-CM

## 2022-09-03 LAB — GLUCOSE BLD-MCNC: 92 MG/DL (ref 65–105)

## 2022-09-03 PROCEDURE — 96375 TX/PRO/DX INJ NEW DRUG ADDON: CPT

## 2022-09-03 PROCEDURE — 6360000002 HC RX W HCPCS: Performed by: EMERGENCY MEDICINE

## 2022-09-03 PROCEDURE — 96374 THER/PROPH/DIAG INJ IV PUSH: CPT

## 2022-09-03 PROCEDURE — 6360000002 HC RX W HCPCS: Performed by: STUDENT IN AN ORGANIZED HEALTH CARE EDUCATION/TRAINING PROGRAM

## 2022-09-03 PROCEDURE — 94640 AIRWAY INHALATION TREATMENT: CPT

## 2022-09-03 PROCEDURE — 43752 NASAL/OROGASTRIC W/TUBE PLMT: CPT

## 2022-09-03 PROCEDURE — 99284 EMERGENCY DEPT VISIT MOD MDM: CPT

## 2022-09-03 PROCEDURE — 6370000000 HC RX 637 (ALT 250 FOR IP): Performed by: EMERGENCY MEDICINE

## 2022-09-03 PROCEDURE — C1769 GUIDE WIRE: HCPCS

## 2022-09-03 PROCEDURE — 2709999900 HC NON-CHARGEABLE SUPPLY

## 2022-09-03 PROCEDURE — 6360000004 HC RX CONTRAST MEDICATION: Performed by: EMERGENCY MEDICINE

## 2022-09-03 PROCEDURE — 82947 ASSAY GLUCOSE BLOOD QUANT: CPT

## 2022-09-03 RX ORDER — FENTANYL CITRATE 50 UG/ML
50 INJECTION, SOLUTION INTRAMUSCULAR; INTRAVENOUS ONCE
Status: COMPLETED | OUTPATIENT
Start: 2022-09-03 | End: 2022-09-03

## 2022-09-03 RX ORDER — ACETAMINOPHEN 160 MG/5ML
500 SOLUTION ORAL ONCE
Status: COMPLETED | OUTPATIENT
Start: 2022-09-03 | End: 2022-09-03

## 2022-09-03 RX ORDER — MORPHINE SULFATE 4 MG/ML
4 INJECTION, SOLUTION INTRAMUSCULAR; INTRAVENOUS ONCE
Status: DISCONTINUED | OUTPATIENT
Start: 2022-09-03 | End: 2022-09-03 | Stop reason: HOSPADM

## 2022-09-03 RX ORDER — ACETAMINOPHEN 500 MG
1000 TABLET ORAL ONCE
Status: DISCONTINUED | OUTPATIENT
Start: 2022-09-03 | End: 2022-09-03

## 2022-09-03 RX ORDER — ACETAMINOPHEN 325 MG/1
650 TABLET ORAL ONCE
Status: DISCONTINUED | OUTPATIENT
Start: 2022-09-03 | End: 2022-09-03

## 2022-09-03 RX ORDER — LORAZEPAM 2 MG/ML
0.5 INJECTION INTRAMUSCULAR EVERY 4 HOURS PRN
Status: DISCONTINUED | OUTPATIENT
Start: 2022-09-03 | End: 2022-09-03 | Stop reason: HOSPADM

## 2022-09-03 RX ADMIN — ACETAMINOPHEN 500 MG: 650 SOLUTION ORAL at 19:48

## 2022-09-03 RX ADMIN — FENTANYL CITRATE 50 MCG: 50 INJECTION, SOLUTION INTRAMUSCULAR; INTRAVENOUS at 11:46

## 2022-09-03 RX ADMIN — FENTANYL CITRATE 50 MCG: 50 INJECTION, SOLUTION INTRAMUSCULAR; INTRAVENOUS at 18:23

## 2022-09-03 RX ADMIN — LORAZEPAM 0.5 MG: 2 INJECTION, SOLUTION INTRAMUSCULAR; INTRAVENOUS at 03:40

## 2022-09-03 RX ADMIN — IOPAMIDOL 12 ML: 755 INJECTION, SOLUTION INTRAVENOUS at 13:27

## 2022-09-03 ASSESSMENT — PAIN SCALES - GENERAL: PAINLEVEL_OUTOF10: 0

## 2022-09-03 ASSESSMENT — PAIN - FUNCTIONAL ASSESSMENT: PAIN_FUNCTIONAL_ASSESSMENT: 0-10

## 2022-09-03 ASSESSMENT — PAIN DESCRIPTION - LOCATION: LOCATION: HAND

## 2022-09-03 ASSESSMENT — PAIN DESCRIPTION - DESCRIPTORS: DESCRIPTORS: ACHING

## 2022-09-03 ASSESSMENT — PAIN DESCRIPTION - FREQUENCY: FREQUENCY: CONTINUOUS

## 2022-09-03 ASSESSMENT — PAIN DESCRIPTION - ORIENTATION: ORIENTATION: LEFT

## 2022-09-03 NOTE — BRIEF OP NOTE
Brief Postoperative Note    Dulce Ojeda  YOB: 1945  [id number]    Pre-operative Diagnosis: HH; need NG more distal in stomach    Post-operative Diagnosis: Same    Procedure: Advancement NG under fluoro    Anesthesia: None    Surgeons/Assistants: Rina Lance MD     Estimated Blood Loss: None    Complications: None    Specimens: Was Not Obtained    Findings: Existing NG tip advanced into distal stomach over a wire, under fluoro. Ready for use at this time.     Electronically signed by Rina Lance MD on 9/3/2022 at 1:28 PM

## 2022-09-03 NOTE — ED PROVIDER NOTES
[facility] ED  EMERGENCY DEPARTMENT ENCOUNTER      Pt Name: Michael Diaz  MRN: [id number]  [place] 1945  Date of evaluation: 9/2/2022  Provider: Rob Mcpherson MD    CHIEF COMPLAINT       Chief Complaint   Patient presents with    Feeding Tube Problem     Pt has NG tube in place in right nare, pt reported to have pulled tube out approx 1.5 feet PTA, pt has reported continuous tube feed infusing at facility         HISTORY OF PRESENT ILLNESS   (Location/Symptom, Timing/Onset, Context/Setting, Quality, Duration, Modifying Factors, Severity)  Note limiting factors. Michael Diaz is a 69 y.o. female who presents to the emergency department      56-year-old female sent to the emergency department from her ECF for evaluation of her NG tube. Concerned that NG tube was coming out of place. Patient had just been transferred to the [facility] from [address] [place] [address]. [facility]. Status post subdural hematoma. Patient was aspirating and having difficulty swallowing resulting in NG tube palcement. Per ECF she was supposed to be receiving continuous feeds through her NG tube. Patient is awake and alert. She is denying any acute pain complaints. No nausea or vomiting. No reflux symptoms. No chest pain. No shortness of breath. No abdominal pain. Nursing Notes were reviewed. REVIEW OF SYSTEMS    (2-9 systems for level 4, 10 or more for level 5)     Review of Systems   All other systems reviewed and are negative. Except as noted above the remainder of the review of systems was reviewed and negative.        PAST MEDICAL HISTORY     Past Medical History:   Diagnosis Date    A-fib [facility])     Biventricular congestive heart failure (HCC)     Bronchiectasis with acute exacerbation ([facility] 04/29/2022    CAD (coronary artery disease)     Chronic pain syndrome     dilaudid infusion pump    COPD (chronic obstructive pulmonary disease) (HCC)     Depression     GERD (gastroesophageal reflux disease)     Hypothyroidism WRIST FRACTURE SURGERY Left 12/20/2018    WRIST SURGERY Left 07/02/2019    left wrist ulnar shortening osteotomy         CURRENT MEDICATIONS       Discharge Medication List as of 9/3/2022 10:06 AM        CONTINUE these medications which have NOT CHANGED    Details   amLODIPine (NORVASC) 10 MG tablet Take 1 tablet by mouth daily, Disp-30 tablet, R-3DC to [facility]      benzocaine-menthol (CEPACOL SORE THROAT) 15-3.6 MG lozenge Take 1 lozenge by mouth every 2 hours as needed for Sore Throat, Disp-168 lozengeDC to [facility]      enoxaparin Sodium (LOVENOX) 30 MG/0.3ML injection Inject 0.3 mLs into the skin 2 times daily for 14 days, Disp-8.4 mL, R-0DC to SNF      levETIRAcetam (KEPPRA) 100 MG/ML solution Take 10 mLs by mouth 2 times daily, R-3DC to [facility]      tiZANidine (ZANAFLEX) 2 MG tablet Take 1 tablet by mouth every 8 hours as needed (pain)DC to SNF      ibuprofen (ADVIL;MOTRIN) 400 MG tablet Take 1 tablet by mouth every 6 hours as needed for Pain, Disp-120 tablet, R-3OTC      DULoxetine (CYMBALTA) 60 MG extended release capsule Take 60 mg by mouth dailyHistorical Med      traZODone (DESYREL) 100 MG tablet Take 200 mg by mouth nightlyHistorical Med      hydroxychloroquine (PLAQUENIL) 200 MG tablet Take 300 mg by mouth dailyHistorical Med      calcium citrate-vitamin D (CITRICAL + D) 315-250 MG-UNIT TABS per tablet Take 1 tablet by mouth 2 times daily (with meals)Historical Med      potassium chloride (KLOR-CON) 20 MEQ packet Take 20 mEq by mouth 2 times dailyHistorical Med      alendronate (FOSAMAX) 70 MG tablet Take 1 tablet by mouth every 7 days On Sundays, Disp-12 tablet, R-3Normal      pantoprazole sodium (PROTONIX) 40 MG PACK packet Take 1 packet by mouth in the morning and 1 packet in the evening. Take before meals. , Disp-180 each, R-3Normal      sertraline (ZOLOFT) 50 MG tablet Take 1 tablet by mouth daily, Disp-30 tablet, R-5Normal      levothyroxine (SYNTHROID) 200 MCG tablet Take 1 tablet by mouth Daily, Disp-90 09/02/22 2039 09/02/22 2039 09/02/22 2039 09/02/22 2039 -- --   (!) 172/66 98.1 °F (36.7 °C) Tympanic 71 17 94 %         Physical Exam  Vitals and nursing note reviewed. Constitutional:       General: She is not in acute distress. HENT:      Head: Normocephalic. Comments: Staples and sutures. Eyes:      Conjunctiva/sclera: Conjunctivae normal.   Cardiovascular:      Rate and Rhythm: Normal rate and regular rhythm. Pulmonary:      Effort: Pulmonary effort is normal. No respiratory distress. Comments: Coarse breath sounds bilaterally. No acute respiratory distress  Abdominal:      General: There is no distension. Palpations: Abdomen is soft. Tenderness: There is no abdominal tenderness. Musculoskeletal:         General: No swelling or tenderness. Cervical back: Normal range of motion. Neurological:      Mental Status: She is alert. DIAGNOSTIC RESULTS     EKG: All EKG's are interpreted by the Emergency Department Physician who either signs or Co-signs this chart in the absence of a cardiologist.        RADIOLOGY:   Non-plain film images such as CT, Ultrasound and MRI are read by the radiologist. Plain radiographic images are visualized and preliminarily interpreted by the emergency physician with the below findings:        Interpretation per the Radiologist below, if available at the time of this note:    XR ABDOMEN (2 VIEWS)   Final Result   Presence of a moderate size hiatal hernia. The NG tube is in the herniated segment of the stomach. XR CHEST PORTABLE   Final Result   Enteric tube in the oropharynx. Recommend repositioning. Moderate bilateral   interstitial infiltrates unchanged. XR ABDOMEN (KUB) (SINGLE AP VIEW)   Final Result   Enteric tube not identified. Recommend repositioning. Ileus. Postsurgical   changes as above.                ED BEDSIDE ULTRASOUND:   Performed by ED Physician - none    LABS:  Labs Reviewed - No data to display    All other labs were within normal range or not returned as of this dictation. EMERGENCY DEPARTMENT COURSE and DIFFERENTIAL DIAGNOSIS/MDM:   Vitals:    Vitals:    09/02/22 2039 09/02/22 2040 09/03/22 0227 09/03/22 0529   BP:  (!) 172/66  (!) 134/50   Pulse: 71      Resp: 17      Temp: 98.1 °F (36.7 °C)      TempSrc: Tympanic      SpO2: 94%  95% 96%         MDM  Number of Diagnoses or Management Options  Dysphagia, unspecified type  Encounter for nasogastric (NG) tube placement  Diagnosis management comments: 69year-old sent from [facility] for NG tube check. Initial chest x-ray and abdominal series show that NG tube was coiled in posterior pharynx. Nursing attempted to advance the NG tube. Repeat KUB showed that the tip was curled in the esophagus. Patient did receive lidocaine 2% administered via aerosol as well as Reglan 10 mg IM. I attempted to place the NG tube. She was tolerating this well. Tube was advanced to 60 cm. X-ray showed NG tube again coiled in esophagus. Attempted to reposition- unchanged despite multiple attempts. Patient tolerated repositioning attempts without discomfort. .  Old records reviewed. Xrs from recent hospitalization also show NG tube coiled in the distal esophagus on films from recent hospitalization. Interventional radiology services not available locally for PEG tube placement. Patient discussed with surgery at [facility]. [facility]. They did request that the patient be sent to the emergency department where they could evaluate her in the ED at [facility]. Patient and family were updated on plan of care. Patient did receive Ativan for symptoms of anxiety. She was requesting something for head pain due to her recent surgery. 2 mg of morphine were ordered. Patient remained stable. She remains awake alert answering questions appropriately. She is resting comfortably. Stable for transfer for definitive care.         [facility]       REASSESSMENT          CRITICAL CARE TIME   Total Critical Care time was  minutes, excluding separately reportable procedures. There was a high probability of clinically significant/life threatening deterioration in the patient's condition which required my urgent intervention. CONSULTS:  None    PROCEDURES:  Unless otherwise noted below, none     Procedures        FINAL IMPRESSION      1. Dysphagia, unspecified type    2. Encounter for nasogastric (NG) tube placement          DISPOSITION/PLAN   DISPOSITION Decision To Transfer 09/03/2022 12:56:24 AM      PATIENT REFERRED TO:  No follow-up provider specified. DISCHARGE MEDICATIONS:  Discharge Medication List as of 9/3/2022 10:06 AM        Controlled Substances Monitoring:     RX Monitoring 12/17/2018   Attestation The Prescription Monitoring Report for this patient was reviewed today. Periodic Controlled Substance Monitoring No signs of potential drug abuse or diversion identified.        (Please note that portions of this note were completed with a voice recognition program.  Efforts were made to edit the dictations but occasionally words are mis-transcribed.)    Tian Rodriguez MD (electronically signed)  Attending Emergency Physician            Tian Rodriguez MD  09/07/22 1933

## 2022-09-03 NOTE — ED NOTES
Pt. Arrives to ED via EMS transfer from [place] for c/o displaced NG tube. Pt was found to have her NG tube coiled in the herniated segment of the stomach above the diaphragm and was then transferred here. Upon arrival pt had several staples placed on the left side of her head from her surgery for a subdural hematoma. Pt is awake but is semi confused but knows where she is and what is happening.          [place], RN  09/03/22 [address], RN  09/03/22 2463

## 2022-09-03 NOTE — ED NOTES
IR called and stated they adjusted the pts NG tube and is placed at 62 cm     Juan Hernadez, MARVIN  09/03/22 8601

## 2022-09-03 NOTE — ED NOTES
Accepted at [facility], [address] ETA 0800     Harry York  09/03/22 0134

## 2022-09-03 NOTE — ED NOTES
[facility]. Writer spoke with nursing and provided an update that patient would not be picked up for transport until around 20:30 PM. Nursing stating that was fine.       Rony Beckwith, MSALEX  09/03/22 8273

## 2022-09-03 NOTE — ED PROVIDER NOTES
[facility] [facility]  Emergency Department  Emergency Medicine Resident Sign-out     Care of Dulce Ojeda was assumed from Dr. Sirena Rock and is being seen for Other (NG tube displacement)  . The patient's initial evaluation and plan have been discussed with the prior provider who initially evaluated the patient. EMERGENCY DEPARTMENT COURSE / MEDICAL DECISION MAKING:       MEDICATIONS GIVEN:  Orders Placed This Encounter   Medications    acetaminophen (TYLENOL) tablet 1,000 mg    fentaNYL (SUBLIMAZE) injection 50 mcg    iopamidol (ISOVUE-370) 76 % injection 100 mL       LABS / RADIOLOGY:     Labs Reviewed   POC GLUCOSE FINGERSTICK       XR CHEST (SINGLE VIEW FRONTAL)    Result Date: 8/23/2022  EXAMINATION: ONE XRAY VIEW OF THE CHEST 8/23/2022 6:21 am COMPARISON: CT 08/22/2022 and radiographs 04/28/2022 and 03/08/2022. HISTORY: ORDERING SYSTEM PROVIDED HISTORY: pre op testing TECHNOLOGIST PROVIDED HISTORY: pre op testing FINDINGS: Interstitial prominence is again noted. Patchy opacities in the upper lung fields appear more prominent than before. The costophrenic angles are sharp. The cardiomediastinal silhouette appears normal given AP technique. Interstitial prominence consistent with areas of fibrosis with more confluent patchy opacities in the upper lobes that could represent superimposed pneumonia versus confluent fibrosis. XR ABDOMEN (KUB) (SINGLE AP VIEW)    Result Date: 9/2/2022  EXAMINATION: ONE SUPINE XRAY VIEW(S) OF THE ABDOMEN 9/2/2022 9:10 pm COMPARISON: August 24, 2022 HISTORY: ORDERING SYSTEM PROVIDED HISTORY: NG tube problem TECHNOLOGIST PROVIDED HISTORY: NG tube problem FINDINGS: Enteric tube not identified. Right lower quadrant neurostimulator. Intraspinal electrode terminates in the midthoracic spine. Multiple surgical clips in the pelvis. Gas-filled loops of small bowel. Mild dextroconvex scoliosis. Enteric tube not identified. Recommend repositioning. Ileus. Postsurgical changes as above. CT Head wo Contrast    Result Date: 8/24/2022  EXAMINATION: CT OF THE HEAD WITHOUT CONTRAST  8/24/2022 3:48 am TECHNIQUE: CT of the head was performed without the administration of intravenous contrast. Automated exposure control, iterative reconstruction, and/or weight based adjustment of the mA/kV was utilized to reduce the radiation dose to as low as reasonably achievable. COMPARISON: 08/23/2022 HISTORY: ORDERING SYSTEM PROVIDED HISTORY: post op TECHNOLOGIST PROVIDED HISTORY: post op FINDINGS: BRAIN/VENTRICLES: There has been interval left craniotomy with skull defect extending from frontal through parietal and temporal lobe. There is left subdural blood underlying the craniotomy defect which has decreased in size from prior. .  This extends from frontal to occipital region with small amount tracking along the falx and tentorium. Gas in the subdural region consistent with postsurgical changes. Slight improvement in left right midline shift about 4 mm compared to 6 mm on prior. ORBITS: The visualized portion of the orbits demonstrate no acute abnormality. SINUSES: The visualized paranasal sinuses and mastoid air cells demonstrate no acute abnormality. SOFT TISSUES/SKULL:  Extensive left craniotomy. Overlying edema in the scalp and skin staples consistent with expected postsurgical changes. Few gas bubbles in the overlying soft tissues. Partially imaged surgical drain terminating in the left temporal soft tissues. 1. Status post left hemispheric craniotomy with presumed evacuation of subdural hematoma. Expected postsurgical changes of subdural and subcutaneous tissue gas at the surgical site. Subdural blood has decreased and there is lesser degree of left right mediastinal shift.      CT HEAD WO CONTRAST    Result Date: 8/23/2022  EXAMINATION: CT OF THE HEAD WITHOUT CONTRAST  8/23/2022 6:25 am TECHNIQUE: CT of the head was performed without the administration of intravenous contrast. Automated exposure control, iterative reconstruction, and/or weight based adjustment of the mA/kV was utilized to reduce the radiation dose to as low as reasonably achievable. COMPARISON: CT brain performed 08/23/2022. HISTORY: ORDERING SYSTEM PROVIDED HISTORY: Follow up CT to assess for worsening intracranial edema or other pathology TECHNOLOGIST PROVIDED HISTORY: Follow up CT to assess for worsening intracranial edema or other pathology FINDINGS: BRAIN/VENTRICLES: There is redemonstration of a subdural hemorrhage adjacent to the left cerebral hemisphere that is similar in size compared to prior examination. Is similar mass effect with rightward midline shift measuring 6 mm. The ventricular structures are stable. The infratentorial structures are. ORBITS: The visualized portion of the orbits demonstrate no acute abnormality. SINUSES: The visualized paranasal sinuses and mastoid air cells demonstrate no acute abnormality. SOFT TISSUES/SKULL:  No acute abnormality of the visualized skull or soft tissues. Subdural hemorrhage adjacent to the left cerebral hemisphere with stable mass effect and rightward shift. CT HEAD WO CONTRAST    Result Date: 8/23/2022  EXAMINATION: CT OF THE HEAD WITHOUT CONTRAST  8/23/2022 12:06 am TECHNIQUE: CT of the head was performed without the administration of intravenous contrast. Automated exposure control, iterative reconstruction, and/or weight based adjustment of the mA/kV was utilized to reduce the radiation dose to as low as reasonably achievable. COMPARISON: Earlier today. HISTORY: ORDERING SYSTEM PROVIDED HISTORY: Follow up of [facility] TECHNOLOGIST PROVIDED HISTORY: Follow up of [facility] Reason for Exam: Follow up of [facility] FINDINGS: BRAIN/VENTRICLES: Stable acute subdural hemorrhage over the left cerebral convexity and minimally along the falx measuring up to 1.1 cm in maximal thickness along the lateral aspect of the left parietal and occipital lobes.  Stable 6 mm rightward shift of midline. No new hemorrhage or evidence of acute infarct. No hydrocephalus. Basal cisterns patent. ORBITS: The visualized portion of the orbits demonstrate no acute abnormality. SINUSES: The visualized paranasal sinuses and mastoid air cells demonstrate no acute abnormality. SOFT TISSUES/SKULL:  No acute abnormality of the visualized skull or soft tissues. Stable acute left cerebral convexity subdural hematoma and 6 mm rightward shift of midline. No new hemorrhage or evidence of acute infarct. CT HEAD WO CONTRAST    Result Date: 8/22/2022  EXAMINATION: CT OF THE HEAD WITHOUT CONTRAST  8/22/2022 2:02 pm TECHNIQUE: CT of the head was performed without the administration of intravenous contrast. Automated exposure control, iterative reconstruction, and/or weight based adjustment of the mA/kV was utilized to reduce the radiation dose to as low as reasonably achievable. COMPARISON: None. HISTORY: ORDERING SYSTEM PROVIDED HISTORY: fall TECHNOLOGIST PROVIDED HISTORY: fall Decision Support Exception - unselect if not a suspected or confirmed emergency medical condition->Emergency Medical Condition (MA) FINDINGS: BRAIN/VENTRICLES: Left holo-hemispheric subdural hematoma with a maximum depth of approximately 18 mm. Mass effect on the underlying cerebral parenchyma with effacement of the sulci. Mild shift of the midline from left to right approximately 5 mm. Suggestion of mild subarachnoid hemorrhage. The gray-white differentiation is otherwise maintained without evidence of an acute infarct. There is no evidence of hydrocephalus. Cerebellum and brainstem are within normal limits. ORBITS: The visualized portion of the orbits demonstrate no acute abnormality. SINUSES: The visualized paranasal sinuses and mastoid air cells demonstrate no acute abnormality. SOFT TISSUES/SKULL:  No acute abnormality of the visualized skull or soft tissues.      Left holo-hemispheric subdural hematoma with a maximum depth of approximately 18 mm. Suggestion of mild subarachnoid hemorrhage. Critical results were called by Dr. Lillie Swanson MD to Dr. Tc Spence on 8/22/2022 at 17:26. CT CERVICAL SPINE WO CONTRAST    Result Date: 8/22/2022  EXAMINATION: CT OF THE CERVICAL SPINE WITHOUT CONTRAST 8/22/2022 6:11 pm TECHNIQUE: CT of the cervical spine was performed without the administration of intravenous contrast. Multiplanar reformatted images are provided for review. Automated exposure control, iterative reconstruction, and/or weight based adjustment of the mA/kV was utilized to reduce the radiation dose to as low as reasonably achievable. COMPARISON: August 26, 2011 HISTORY: ORDERING SYSTEM PROVIDED HISTORY: trauma TECHNOLOGIST PROVIDED HISTORY: trauma Decision Support Exception - unselect if not a suspected or confirmed emergency medical condition->Emergency Medical Condition (MA) FINDINGS: BONES/ALIGNMENT: There is no acute fracture or traumatic malalignment. DEGENERATIVE CHANGES: Postoperative changes stable which include posterior fusing C3 through C6 as well as laminectomy at this level. Hardware also anteriorly fuses C6-7 C7-T1. Moderate multilevel degenerative change throughout. SOFT TISSUES: There is no prevertebral soft tissue swelling. No acute abnormality of the cervical spine. XR CHEST PORTABLE    Result Date: 9/2/2022  EXAMINATION: ONE XRAY VIEW OF THE CHEST 9/2/2022 9:10 pm COMPARISON: August 26, 2022 HISTORY: ORDERING SYSTEM PROVIDED HISTORY: NG tube problem TECHNOLOGIST PROVIDED HISTORY: NG tube problem FINDINGS: Enteric tube terminates in the oropharynx. Moderate bilateral interstitial infiltrates. Cardiomegaly. Mediastinum normal.  Intraspinal electrode midthoracic spine. Shoulder arthroplasty on the left. Enteric tube in the oropharynx. Recommend repositioning. Moderate bilateral interstitial infiltrates unchanged.      XR CHEST PORTABLE    Result Date: August 2022 HISTORY: ORDERING SYSTEM PROVIDED HISTORY: while intubated, verify ETT TECHNOLOGIST PROVIDED HISTORY: while intubated, verify ETT FINDINGS: There is an endotracheal tube with the tip 1.7 cm above the alejandro. There is an enteric tube which forms a loop in the distal esophagus with the tip and side-port in the stomach. Stable cardiomediastinal silhouette. No pneumothorax or pleural effusion. Scattered patchy airspace opacities predominantly in the upper lobes. 1.  Endotracheal tube tip is 1.7 cm above the alejandro. Recommend retraction by an additional 2 cm for optimal positioning. 2.  Patchy airspace opacities predominantly in the upper lobes. Consider pneumonia in the appropriate clinical setting. XR CHEST PORTABLE    Result Date: 8/23/2022  EXAMINATION: ONE XRAY VIEW OF THE CHEST 8/23/2022 5:33 pm COMPARISON: 08/23/2022, 04/28/2022 and 03/08/2022. HISTORY: ORDERING SYSTEM PROVIDED HISTORY: verify ETT s/p surgery TECHNOLOGIST PROVIDED HISTORY: verify ETT s/p surgery FINDINGS: A new endotracheal tube is approximately 4 cm above the alejandro. Interstitial prominence with there is linearity are again noted. Patchy opacities of the upper lung fields appear improved. No effusion is identified. The cardiac silhouette is not enlarged. 1. Placement endotracheal tube with the tip 4.1 cm above the alejandro. 2. Interstitial prominence consistent with fibrosis. Patchy opacities of the upper lung fields appear improved and could represent superimposed pneumonia or atelectasis. XR ABDOMEN FOR NG/OG/NE TUBE PLACEMENT    Result Date: 8/24/2022  EXAMINATION: ONE SUPINE XRAY VIEW(S) OF THE ABDOMEN 8/24/2022 12:46 am COMPARISON: 08/24/2022, 08/23/2022 HISTORY: ORDERING SYSTEM PROVIDED HISTORY: NG TECHNOLOGIST PROVIDED HISTORY: NG Portable? ->Yes Reason for Exam: pulled ng back  supine port FINDINGS: Slightly retracted position of the NG tube although it remains looped in the esophagus distally, and with the tip overlying the gastric body. Nonspecific bowel gas pattern. The lung bases are unremarkable. Slightly retracted NG tube, although it remains looped in the distal esophagus. XR ABDOMEN FOR NG/OG/NE TUBE PLACEMENT    Result Date: 8/24/2022  EXAMINATION: ONE SUPINE XRAY VIEW(S) OF THE ABDOMEN 8/24/2022 12:44 am COMPARISON: 08/23/2022 HISTORY: ORDERING SYSTEM PROVIDED HISTORY: Confirmation of course of NG/OG/NE tube and location of tip of tube TECHNOLOGIST PROVIDED HISTORY: Confirmation of course of NG/OG/NE tube and location of tip of tube Portable? ->Yes Reason for Exam: reinserted ng   supine port FINDINGS: Nasogastric tube remains looped in the distal esophagus, with tip overlying the distal stomach. Nonspecific bowel gas pattern. Nasogastric tube is looped in the distal esophagus, with tip overlying the distal stomach. XR ABDOMEN FOR NG/OG/NE TUBE PLACEMENT    Result Date: 8/24/2022  EXAMINATION: ONE SUPINE XRAY VIEW(S) OF THE ABDOMEN 8/23/2022 11:59 pm COMPARISON: None. HISTORY: ORDERING SYSTEM PROVIDED HISTORY: Confirmation of course of NG/OG/NE tube and location of tip of tube TECHNOLOGIST PROVIDED HISTORY: Confirmation of course of NG/OG/NE tube and location of tip of tube Portable? ->Yes Reason for Exam: ng placement supine port FINDINGS: Nasogastric tube appears looped in the distal esophagus, with tip overlying the mid abdomen. Spinal stimulator wires. Nonspecific bowel gas pattern. The lung bases are unremarkable. Nasogastric tube is looped in the distal esophagus, with tip overlying the distal stomach. CT CHEST ABDOMEN PELVIS W CONTRAST    Result Date: 8/22/2022  EXAMINATION: CT OF THE CHEST, ABDOMEN, AND PELVIS WITH CONTRAST 8/22/2022 5:56 pm TECHNIQUE: CT of the chest, abdomen and pelvis was performed with the administration of intravenous contrast. Multiplanar reformatted images are provided for review.  Automated exposure control, iterative reconstruction, and/or weight based adjustment of the mA/kV was utilized to reduce the radiation dose to as low as reasonably achievable. COMPARISON: 04/28/2022 and 10/14/2021 HISTORY: ORDERING SYSTEM PROVIDED HISTORY: fall TECHNOLOGIST PROVIDED HISTORY: fall Decision Support Exception - unselect if not a suspected or confirmed emergency medical condition->Emergency Medical Condition (MA) FINDINGS: Chest: Mediastinum: Heart size is normal without pericardial effusion. Coronary artery atherosclerosis. Thoracic aorta and main pulmonary artery are normal in caliber. There are mildly enlarged mediastinal lymph nodes. Moderate hiatal hernia. Lungs/pleura: There is no pleural effusion. There is no pneumothorax. There are areas of consolidation and ground-glass in the upper lobes, similar to slightly improved from prior examination. The right lower lobe consolidation seen previously has near completely resolved. Soft Tissues/Bones: Cervicothoracic fusion is partially visualized. There is focal kyphosis in the lower thoracic spine. There is multilevel degenerative change. Spinal stimulator terminates at T8.  2.1 x 1.5 cm subcutaneous cyst in the right anterior chest wall, likely a sebaceous cyst. Abdomen/Pelvis: Organs: No acute abnormality within the liver, spleen, pancreas, or adrenal glands. Cholelithiasis without pericholecystic fluid. The common bile duct is dilated, measuring 1.3 cm. No nephrolithiasis or hydronephrosis. GI/Bowel: Moderate hiatal hernia. Stomach is partially distended. The small bowel is nondilated. Colon is nondilated. Pelvis: Bladder is partially distended without vesicular stone. Peritoneum/Retroperitoneum: No ascites or pneumoperitoneum. Atherosclerosis in the nondilated abdominal aorta. Mildly enlarged inguinal lymph nodes. Bones/Soft Tissues: Multilevel degenerative changes of the lumbar spine. Spinal stimulator is noted.      1. Interval improvement in the bilateral pulmonary consolidation from prior examination. There is some persistent consolidation and ground-glass in the upper lobes. 2. Mediastinal lymphadenopathy. 3. No acute intra-abdominal abnormality. 4. Cholelithiasis without cholecystitis. 5. Dilation of the common bile duct without obstructing stone or lesion identified, nonspecific. 6. Moderate hiatal hernia. FL MODIFIED BARIUM SWALLOW W VIDEO    Result Date: 8/30/2022  EXAMINATION: MODIFIED BARIUM SWALLOW WAS PERFORMED IN CONJUNCTION WITH SPEECH PATHOLOGY SERVICES TECHNIQUE: Fluoroscopic evaluation of the swallowing mechanism was performed using cineradiography with multiple consistency of barium product in conjunction with speech pathology services. FLUOROSCOPY DOSE AND TYPE OR TIME AND EXPOSURES: DAP [id number] COMPARISON: None HISTORY: ORDERING SYSTEM PROVIDED HISTORY: dysphagia TECHNOLOGIST PROVIDED HISTORY: dysphagia FINDINGS: Thick liquid by spoon: Aspiration. No cough response. Soft solid: Aspiration. No cough response. Puree: No laryngeal penetration or aspiration initially but delayed aspiration after swallowing. No cough response. Incidental pharyngeal diverticulum. Aspiration of all consistencies as discussed above. Incidental pharyngeal diverticulum. Please see separate speech pathology report for full discussion of findings and recommendations. CT LUMBAR SPINE TRAUMA RECONSTRUCTION    Result Date: 8/22/2022  EXAMINATION: CT OF THE THORACIC SPINE WITHOUT CONTRAST; CT OF THE LUMBAR SPINE WITHOUT CONTRAST 8/22/2022 TECHNIQUE: CT of the thoracic spine was performed without the administration of intravenous contrast. Multiplanar reformatted images are provided for review.  Automated exposure control, iterative reconstruction, and/or weight based adjustment of the mA/kV was utilized to reduce the radiation dose to as low as reasonably achievable.; CT of the lumbar spine was performed without the administration of intravenous contrast. Multiplanar reformatted images are provided for review. Adjustment of mA and/or kV according to patient size was utilized. Automated exposure control, iterative reconstruction, and/or weight based adjustment of the mA/kV was utilized to reduce the radiation dose to as low as reasonably achievable. COMPARISON: None. HISTORY: ORDERING SYSTEM PROVIDED HISTORY: TRAUMA TECHNOLOGIST PROVIDED HISTORY: TRAUMA FINDINGS: BONES/ALIGNMENT: There is levocurvature of the lower thoracic spine. No significant listhesis. Vertebral body heights are maintained. No displaced fracture. Anterior fixation at the cervicothoracic junction extends to T1. Spinal stimulator terminates at T8. S shaped scoliosis in the lumbar spine. There is retrolisthesis at L1-2 and L2-3. Vertebral body heights are maintained. Prior intervertebral arthrodesis at L5-S1. DEGENERATIVE CHANGES: Multilevel degenerative changes in the thoracic and lumbar spines. SOFT TISSUES: No paraspinal mass is seen. 1.  No acute abnormality in the thoracic or lumbar spines. 2.  Multilevel degenerative change. CT THORACIC SPINE TRAUMA RECONSTRUCTION    Result Date: 8/22/2022  EXAMINATION: CT OF THE THORACIC SPINE WITHOUT CONTRAST; CT OF THE LUMBAR SPINE WITHOUT CONTRAST 8/22/2022 TECHNIQUE: CT of the thoracic spine was performed without the administration of intravenous contrast. Multiplanar reformatted images are provided for review. Automated exposure control, iterative reconstruction, and/or weight based adjustment of the mA/kV was utilized to reduce the radiation dose to as low as reasonably achievable.; CT of the lumbar spine was performed without the administration of intravenous contrast. Multiplanar reformatted images are provided for review. Adjustment of mA and/or kV according to patient size was utilized. Automated exposure control, iterative reconstruction, and/or weight based adjustment of the mA/kV was utilized to reduce the radiation dose to as low as reasonably achievable. COMPARISON: None. HISTORY: ORDERING SYSTEM PROVIDED HISTORY: TRAUMA TECHNOLOGIST PROVIDED HISTORY: TRAUMA FINDINGS: BONES/ALIGNMENT: There is levocurvature of the lower thoracic spine. No significant listhesis. Vertebral body heights are maintained. No displaced fracture. Anterior fixation at the cervicothoracic junction extends to T1. Spinal stimulator terminates at T8. S shaped scoliosis in the lumbar spine. There is retrolisthesis at L1-2 and L2-3. Vertebral body heights are maintained. Prior intervertebral arthrodesis at L5-S1. DEGENERATIVE CHANGES: Multilevel degenerative changes in the thoracic and lumbar spines. SOFT TISSUES: No paraspinal mass is seen. 1.  No acute abnormality in the thoracic or lumbar spines. 2.  Multilevel degenerative change. XR ABDOMEN (2 VIEWS)    Result Date: 9/3/2022  EXAMINATION: TWO XRAY VIEWS OF THE ABDOMEN 9/3/2022 12:47 am COMPARISON: None. HISTORY: ORDERING SYSTEM PROVIDED HISTORY: ng tube position TECHNOLOGIST PROVIDED HISTORY: ng tube position FINDINGS: As seen on the CT chest August the 22nd 2022 there is a moderate size hiatal hernia. The NG tube is coiled in the herniated segment of the stomach above the diaphragma. The tip of the NG tube projects towards the fundal region of the stomach. There is a neural stimulator device with intrathecal catheter placed in the thoracolumbar spine. The pacemaker device is located the in the right anterior abdominal wall meso gastric region. The bowel pattern is nonspecific. There is some air distension of the bowel including transverse colon and small bowel but there is no indication for bowel obstruction. .     Presence of a moderate size hiatal hernia. The NG tube is in the herniated segment of the stomach. RECENT VITALS:     Temp: 97.6 °F (36.4 °C),  Heart Rate: 66, Resp: 16, BP: 112/64, SpO2: 92 %      This patient is a 69 y.o.  Female with NG-tube complications, patient was transferred from [facility] due to NG tube being coiled in her stomach after multiple attempts to replace the tube. Was transferred due to IR capability, Dr. Shameka Richards aware of patient. She was recently discharged after a head bleed and abdominal surgery, general surgery was consulted on arrival.  Recommended IR adjustment of NG tube. NG tube is in correct place and functioning well. Was bridled by general surgery. Currently waiting on transport back to [facility]      ED Course as of 09/03/22 1722   Sat Sep 03, 2022   1145 Per general surgery will contact IR for NG tube placement. [SS]      ED Course User Index  [SS] China Lynch MD       OUTSTANDING TASKS / RECOMMENDATIONS:    Transportation home      FINAL IMPRESSION:   No diagnosis found. DISPOSITION:         DISPOSITION:  [x]  Discharge   []  Transfer -    []  Admission -     []  Against Medical Advice   []  Eloped   FOLLOW-UP: No follow-up provider specified.    DISCHARGE MEDICATIONS: New Prescriptions    No medications on file          Adam Awan DO  Emergency Medicine Resident  [address] [place]  Resident  09/03/22 [id number]

## 2022-09-03 NOTE — ED PROVIDER NOTES
FACULTY SIGN-OUT  ADDENDUM     Care of this patient was assumed from previous attending physician. The patient's initial evaluation and plan have been discussed with the prior provider who initially evaluated the patient. Attestation  I was available and discussed any additional care issues that arose and coordinated the management plans with the resident(s) caring for the patient during my duty period. Any areas of disagreement with resident's documentation of care or procedures are noted on the chart. I was personally present for the key portions of any/all procedures, during my duty period. I have documented in the chart those procedures where I was not present during the key portions. ED COURSE      The patient was given the following medications:  Orders Placed This Encounter   Medications    acetaminophen (TYLENOL) tablet 1,000 mg    fentaNYL (SUBLIMAZE) injection 50 mcg    iopamidol (ISOVUE-370) 76 % injection 100 mL       RECENT VITALS:   Temp: 97.6 °F (36.4 °C), Heart Rate: 66, Resp: 16, BP: 112/64    MEDICAL DECISION MAKING        Toña Radford is a 69 y.o. female who presents to the Emergency Department with complaints of NG tube malfunction. IR placed after general surgery evaluated. Anticipate discharge back to transferring facility. Carla De La Cruz MD, MD, F.A.C.E.P.   Attending Emergency Physician    (Please note that portions of this note were completed with a voice recognition program.  Efforts were made to edit the dictations but occasionally words are mis-transcribed.)         Carla De La Cruz MD  09/03/22 [id number]

## 2022-09-03 NOTE — ED NOTES
Writer met with patient at bedside to obtain information was to where she resides. Patient reports she is living at [facility] of [address] home. Writer contacted staff at the [facility] to confirm patient is living at their facility and it was confirmed by nursing staff. I informed staff transportation was being arranged and was asked to call back with an ETA of  for patient.       LUCIA Rasmussen  09/03/22 6715

## 2022-09-03 NOTE — ED NOTES
Called [facility], was not sent with paperwork     Maureen Vargas RN  09/02/22 7216

## 2022-09-03 NOTE — ED PROVIDER NOTES
[address] [facility]  ED     Emergency Department     Faculty Attestation    I performed a history and physical examination of the patient and discussed management with the resident. I reviewed the residents note and agree with the documented findings and plan of care. Any areas of disagreement are noted on the chart. I was personally present for the key portions of any procedures. I have documented in the chart those procedures where I was not present during the key portions. I have reviewed the emergency nurses triage note. I agree with the chief complaint, past medical history, past surgical history, allergies, medications, social and family history as documented unless otherwise noted below. For Physician Assistant/ Nurse Practitioner cases/documentation I have personally evaluated this patient and have completed at least one if not all key elements of the E/M (history, physical exam, and MDM). Additional findings are as noted. Patient transferred here from [place] ER due to a malfunctioning NG tube. Reportedly the NG tube was attempted to be replaced multiple times at [place] without success. The ER doc at [facility] spoke with the general surgeon here who asked that the patient be transferred here for possible PEG tube placement. Will consult general surgery.       Kamlesh Delarosa MD  Attending Emergency  Physician            Elias Berg MD  09/03/22 0097

## 2022-09-05 ENCOUNTER — APPOINTMENT (OUTPATIENT)
Dept: GENERAL RADIOLOGY | Age: 77
End: 2022-09-05
Payer: MEDICARE

## 2022-09-05 ENCOUNTER — HOSPITAL ENCOUNTER (EMERGENCY)
Age: 77
Discharge: HOME | End: 2022-09-05
Attending: EMERGENCY MEDICINE
Payer: MEDICARE

## 2022-09-05 VITALS
SYSTOLIC BLOOD PRESSURE: 133 MMHG | OXYGEN SATURATION: 92 % | DIASTOLIC BLOOD PRESSURE: 60 MMHG | RESPIRATION RATE: 18 BRPM | HEART RATE: 66 BPM | TEMPERATURE: 97.1 F

## 2022-09-05 DIAGNOSIS — T85.598A OBSTRUCTION OF FEEDING TUBE, INITIAL ENCOUNTER: Primary | ICD-10-CM

## 2022-09-05 PROCEDURE — 99283 EMERGENCY DEPT VISIT LOW MDM: CPT | Performed by: EMERGENCY MEDICINE

## 2022-09-05 PROCEDURE — 74018 RADEX ABDOMEN 1 VIEW: CPT

## 2022-09-05 NOTE — ED NOTES
This writer called and updated nurse Cady Hull that NG tube is now patent. This writer reminded her to make sure all medications are finely crushed and disoloved in water prior to putting down tube.  Nadine verbalized understanding     Lisa Finley RN  09/05/22 2467

## 2022-09-05 NOTE — ED NOTES
NG left nares 61 cm occluded unable to flush or withdraw stomach contents. After numerous attempts of aspirating NG tube and flushing without success. It was noted large pill fragment occlusion at approximately 62 cm, guidewire was inserted to approximately 61 cm to 62 cm in which resistance was met and was able to get through occlusion, after several passes of guidewire to 60 cm and 59 cm NG was now free flowing with return of bile green stomach contents. Guidewire was withdrawn and NG was further with piston syringe aspirated with return of approximately 20 ml free flowing bile green secretion (without resistance). NG was then flushed with 30 ml water with ease. Patient tolerated without signs of aspiration.      Emperatriz Valdez RN  09/05/22 7716

## 2022-09-05 NOTE — ED NOTES
Nurse Sophia Jimenez called for update on pt. Sophia Jimenez updated that NG tube is in place per xray. We are currently trying to unclog with cola.   This is helping some--just is a slow process     Megan Jones RN  09/05/22 9346

## 2022-09-05 NOTE — ED NOTES
Nursing Supervisor Ricardo Wheatley RN was able to get flow in the NG by dislodging a pill fragment--Dr. Lance Spatz made aware      Jayson Dunn RN  09/05/22 8907

## 2022-09-05 NOTE — ED NOTES
Per Dr. Juanis Salas RN called to see if he would have any luck flushing tube     Moisés Coronado RN  09/05/22 4042

## 2022-09-05 NOTE — ED PROVIDER NOTES
[facility]  EMERGENCY DEPARTMENT ENCOUNTER      Pt Name: Ana Lilia Vivar  MRN: [id number]  [place] 1945  Date of evaluation: 9/5/2022  Provider: Mariia Nava MD    CHIEF COMPLAINT       Chief Complaint   Patient presents with    Other     NG issues, not flushing and possibly blocked. HISTORY OF PRESENT ILLNESS   (Location/Symptom, Timing/Onset, Context/Setting, Quality, Duration, Modifying Factors, Severity)  Note limiting factors. Ana Lilia Vivar is a 69 y.o. female who presents to the emergency department      64-year-old female sent to the emergency room from her [facility] for evaluation of NG tube not flushing. Patient recently had NG tube replaced under fluoroscopy. Nursing home staff has been unable to flush the medications. Patient is supposed be on continuous tube feeds. Other medications are administered through the tube as well. Nursing Notes were reviewed. REVIEW OF SYSTEMS    (2-9 systems for level 4, 10 or more for level 5)     Review of Systems   All other systems reviewed and are negative. Except as noted above the remainder of the review of systems was reviewed and negative.        PAST MEDICAL HISTORY     Past Medical History:   Diagnosis Date    A-fib [facility])     Biventricular congestive heart failure (HCC)     Bronchiectasis with acute exacerbation ([address] 04/29/2022    CAD (coronary artery disease)     Chronic pain syndrome     dilaudid infusion pump    COPD (chronic obstructive pulmonary disease) (HCC)     Depression     GERD (gastroesophageal reflux disease)     Hypothyroidism     Impaired fasting glucose     Iron deficiency anemia     Osteoporosis     Pulmonary fibrosis ([address] 04/29/2022    Subdural hematoma ([address] 08/23/2022         SURGICAL HISTORY       Past Surgical History:   Procedure Laterality Date    BACK SURGERY  09/09/1998    spinal cord stimulator    BACK SURGERY  08/04/1999    infusion pump insertion    BACK SURGERY  10/23/2003    spinal cord THROAT) 15-3.6 MG lozenge Take 1 lozenge by mouth every 2 hours as needed for Sore Throat  Qty: 168 lozenge      enoxaparin Sodium (LOVENOX) 30 MG/0.3ML injection Inject 0.3 mLs into the skin 2 times daily for 14 days  Qty: 8.4 mL, Refills: 0      levETIRAcetam (KEPPRA) 100 MG/ML solution Take 10 mLs by mouth 2 times daily  Refills: 3      tiZANidine (ZANAFLEX) 2 MG tablet Take 1 tablet by mouth every 8 hours as needed (pain)      ibuprofen (ADVIL;MOTRIN) 400 MG tablet Take 1 tablet by mouth every 6 hours as needed for Pain  Qty: 120 tablet, Refills: 3      DULoxetine (CYMBALTA) 60 MG extended release capsule Take 60 mg by mouth daily      traZODone (DESYREL) 100 MG tablet Take 200 mg by mouth nightly      hydroxychloroquine (PLAQUENIL) 200 MG tablet Take 300 mg by mouth daily      calcium citrate-vitamin D (CITRICAL + D) 315-250 MG-UNIT TABS per tablet Take 1 tablet by mouth 2 times daily (with meals)      potassium chloride (KLOR-CON) 20 MEQ packet Take 20 mEq by mouth 2 times daily      alendronate (FOSAMAX) 70 MG tablet Take 1 tablet by mouth every 7 days On Sundays  Qty: 12 tablet, Refills: 3      pantoprazole sodium (PROTONIX) 40 MG PACK packet Take 1 packet by mouth in the morning and 1 packet in the evening. Take before meals.   Qty: 180 each, Refills: 3      sertraline (ZOLOFT) 50 MG tablet Take 1 tablet by mouth daily  Qty: 30 tablet, Refills: 5      levothyroxine (SYNTHROID) 200 MCG tablet Take 1 tablet by mouth Daily  Qty: 90 tablet, Refills: 3      ipratropium-albuterol (DUONEB) 0.5-2.5 (3) MG/3ML SOLN nebulizer solution 3 mLs      umeclidinium-vilanterol (ANORO ELLIPTA) 62.5-25 MCG/INH AEPB inhaler Inhale 1 puff into the lungs daily  Qty: 3 each, Refills: 3      albuterol sulfate  (90 Base) MCG/ACT inhaler Inhale 2 puffs into the lungs 4 times daily  Qty: 3 each, Refills: 3      furosemide (LASIX) 20 MG tablet Take 1 tablet by mouth daily  Qty: 90 tablet, Refills: 3      pregabalin (LYRICA) 300 MG capsule Take 1 capsule by mouth 2 times daily for 30 days. Qty: 60 capsule, Refills: 0    Associated Diagnoses: Chronic arthritis             ALLERGIES     Patient has no known allergies. FAMILY HISTORY       Family History   Problem Relation Age of Onset    Heart Attack Father 61    Heart Attack Sister     Heart Disease Brother           SOCIAL HISTORY       Social History     Socioeconomic History    Marital status:    Tobacco Use    Smoking status: Former     Packs/day: 1.00     Years: 10.00     Pack years: 10.00     Types: Cigarettes     Quit date: 1976     Years since quittin.8    Smokeless tobacco: Never   Vaping Use    Vaping Use: Never used   Substance and Sexual Activity    Alcohol use: No    Drug use: No     Social Determinants of Health     Financial Resource Strain: Low Risk     Difficulty of Paying Living Expenses: Not hard at all   Food Insecurity: No Food Insecurity    Worried About [place] in the Last Year: Never true    Ran Out of Food in the Last Year: Never true   Physical Activity: Inactive    Days of Exercise per Week: 0 days    Minutes of Exercise per Session: 0 min       SCREENINGS        [place] Coma Scale  Eye Opening: Spontaneous  Best Verbal Response: Oriented  Best Motor Response: Obeys commands  [place] Coma Scale Score: 15               PHYSICAL EXAM    (up to 7 for level 4, 8 or more for level 5)     ED Triage Vitals [22 1032]   BP Temp Temp Source Heart Rate Resp SpO2 Height Weight   133/60 97.1 °F (36.2 °C) Tympanic 66 18 92 % -- --       Physical Exam  Vitals and nursing note reviewed. Constitutional:       Comments: Chronically ill-appearing. Afebrile and nontoxic-appearing. Patient does not appear to be in any acute distress   HENT:      Head: Normocephalic. Comments: Staples in place from recent craniotomy  Cardiovascular:      Rate and Rhythm: Normal rate and regular rhythm.    Pulmonary:      Effort: Pulmonary effort is normal. Breath sounds: Normal breath sounds. Abdominal:      General: There is no distension. Palpations: Abdomen is soft. Skin:     General: Skin is warm and dry. Neurological:      Comments: Alerts to voice. DIAGNOSTIC RESULTS     EKG: All EKG's are interpreted by the Emergency Department Physician who either signs or Co-signs this chart in the absence of a cardiologist.        RADIOLOGY:   Non-plain film images such as CT, Ultrasound and MRI are read by the radiologist. Plain radiographic images are visualized and preliminarily interpreted by the emergency physician with the below findings:        Interpretation per the Radiologist below, if available at the time of this note:    XR ABDOMEN (KUB) (SINGLE AP VIEW)   Final Result   Gastric tube with the tip presumably in the mid aspect of the stomach. ED BEDSIDE ULTRASOUND:   Performed by ED Physician - none    LABS:  Labs Reviewed - No data to display    All other labs were within normal range or not returned as of this dictation. EMERGENCY DEPARTMENT COURSE and DIFFERENTIAL DIAGNOSIS/MDM:   Vitals:    Vitals:    09/05/22 1032   BP: 133/60   Pulse: 66   Resp: 18   Temp: 97.1 °F (36.2 °C)   TempSrc: Tympanic   SpO2: 92%           MDM  Number of Diagnoses or Management Options  Obstruction of feeding tube, initial encounter  Diagnosis management comments: 80-year-old female NG tube clogged. After multiple irrigation attempt nursing was able to unclog patient's NG tube. She is scheduled for repeat swallow study this Tuesday. She is to continue her current feeding schedule as well as medications with instructions to ensure that all medications are very well ground prior to pushing through the tube. Patient is stable for her ECF. ED return and follow-up discussed prior to discharge        Fang 11 time was  minutes, excluding separately reportable procedures.   There was a high probability of clinically significant/life threatening deterioration in the patient's condition which required my urgent intervention. CONSULTS:  None    PROCEDURES:  Unless otherwise noted below, none     Procedures        FINAL IMPRESSION      1. Obstruction of feeding tube, initial encounter          DISPOSITION/PLAN   DISPOSITION Decision To Discharge 09/05/2022 03:52:24 PM      PATIENT REFERRED TO:  Malika Allen MD  [address] [facility] [address]  [phone]      As needed    DISCHARGE MEDICATIONS:  Current Discharge Medication List        Controlled Substances Monitoring:     RX Monitoring 12/17/2018   Attestation The Prescription Monitoring Report for this patient was reviewed today. Periodic Controlled Substance Monitoring No signs of potential drug abuse or diversion identified.        (Please note that portions of this note were completed with a voice recognition program.  Efforts were made to edit the dictations but occasionally words are mis-transcribed.)    Pelon Gallardo MD (electronically signed)  Attending Emergency Physician             Pelon Gallardo MD  09/05/22 6917

## 2022-09-05 NOTE — DISCHARGE INSTRUCTIONS
Continue current feeding schedule. Make sure that all medications are very well ground before administering through the NG tube.   Seek medical attention for any acute concerns

## 2022-09-05 NOTE — ED NOTES
Attempted to flush BG again, slowly blockage is breaking apart, Dr. Flaco Carrasquillo Notified.      Imtiaz Aguilar, MARVIN  09/05/22 4131

## 2022-09-05 NOTE — ED NOTES
Attempted to flush NG tube again--unable to flush anything in or out     Amanda Montes RN  09/05/22 4862

## 2022-09-06 ENCOUNTER — TELEPHONE (OUTPATIENT)
Dept: NEUROLOGY | Age: 77
End: 2022-09-06

## 2022-09-06 ASSESSMENT — ENCOUNTER SYMPTOMS
SHORTNESS OF BREATH: 0
ABDOMINAL PAIN: 0
COUGH: 0

## 2022-09-06 NOTE — TELEPHONE ENCOUNTER
Patient was having some issues with dysarthria-a difficult time mechanically forming words while she was still here in the hospital, if she is having new aphasia, cognitive issues may need to be evaluated in the ER to rule out stroke

## 2022-09-06 NOTE — TELEPHONE ENCOUNTER
[facility] [place] [address] today to report that the patient over the weekend has been having some expressive aphasia as well as some difficulty understanding. Surgery 8.23.22. Follow up 9.14.22.

## 2022-09-06 NOTE — DISCHARGE SUMMARY
bit but not to a safe level to eat or drink. She can be discharged with a nasogastric tube with a expectant swallow study within the next 2 weeks. If despite time and improving strength she does not swallow consideration can be made for a PEG tube placement. She does have a quite large hiatal hernia with a diverticulum. It may be that the PEG tube would need to be placed under fluoroscopy. She can follow-up in our clinic to have that discussion in about 2 weeks    Labs and imaging were followed daily. On day of discharge Britany Alvarez  was tolerating a tube feeds via ng  had adequate analgeia on oral medications  had no signs of complication. She was deemed medically stable for discharged to [address] [place] [address]:        Discharge Vitals:  height is 5' 5\" (1.651 m) and weight is 157 lb 3 oz (71.3 kg). Her oral temperature is 97.9 °F (36.6 °C). Her blood pressure is 157/80 (abnormal) and her pulse is 68. Her respiration is 18 and oxygen saturation is 95%. Exam on day of discharge:  sided SDH with midline shift  Increased seizure risk  L craniotomy for evacuation of SDH  Subgaleal drain removed   LTME completed, showed moderate nonspecific encephalopathy with presence of left parietotemporal sharps increase.     KEPPRA TO DC ON  Fu op nsx  Has headaches which are a bothersome- if motrin ineffective consideration can be made for a steroid but hoping to avoid this      Hx chronic HTN, afib on xarelto, CAD on asa  Norvasc 10mg  Lasix 20every other day      Hx of COPD, frequent oneumonia              Home inhalers add back     Dysphagia  Aspiration risk  Ng tube  Swallow reeval today              Diet per swallow study/speech recs  Expectation is improvement in swallow- she can dc with ng tube with plan on wekly swallow study x 2 and if still not on oral diet-> G tube placement      Hx hypothyroid- -Synthroid 200mcg daily        Chief Complaint: \"Headache\"     Estle Chough Marysol Amin no events overnight , complaining today of headache, walking in room     OBJECTIVE  VITALS: Temp: Temp: 98 °F (36.7 °C)Temp  Av °F (36.7 °C)  Min: 97.7 °F (36.5 °C)  Max: 98.2 °F (57.2 °C) BP Systolic (80LRH), OHZ:172 , Min:120 , TDT:932   Diastolic (27FYE), ORK:46, Min:67, Max:90   Pulse Pulse  Av.6  Min: 64  Max: 79 Resp Resp  Av.1  Min: 13  Max: 19 Pulse ox SpO2  Av.3 %  Min: 92 %  Max: 98 %        Physical Exam  HENT:      Head:      Comments: Craniotomy staples, shaved head, evolving bruises     Mouth/Throat:      Mouth: Mucous membranes are dry. Eyes:      Extraocular Movements: Extraocular movements intact. Cardiovascular:      Rate and Rhythm: Normal rate. Pulses: Normal pulses. Pulmonary:      Effort: Pulmonary effort is normal.   Abdominal:      Palpations: Abdomen is soft. Musculoskeletal:         General: Normal range of motion. Skin:     General: Skin is warm. Capillary Refill: Capillary refill takes less than 2 seconds. Neurological:      General: No focal deficit present. Mental Status: She is alert    LABS:   No results for input(s): WBC, HGB, HCT, PLT, NA, K, CL, CO2, BUN, CREATININE in the last 72 hours. DIAGNOSTIC TESTS:    XR ABDOMEN (KUB) (SINGLE AP VIEW)    Result Date: 2022  EXAMINATION: ONE SUPINE XRAY VIEW(S) OF THE ABDOMEN 2022 10:21 am COMPARISON: Abdominal radiographs performed 2022. HISTORY: ORDERING SYSTEM PROVIDED HISTORY: ng tube malfunction TECHNOLOGIST PROVIDED HISTORY: ng tube malfunction FINDINGS: There is a nonspecific bowel gas pattern without evidence for obstruction. There is no free air. There are no suspicious calcifications. There is a gastric tube with the tip presumably in the mid aspect of the stomach. There is no acute osseous abnormality. The surrounding soft tissues are unremarkable. Gastric tube with the tip presumably in the mid aspect of the stomach.        DISCHARGE INSTRUCTIONS Discharge Medications:        Medication List        START taking these medications      amLODIPine 10 MG tablet  Commonly known as: NORVASC  Take 1 tablet by mouth daily     benzocaine-menthol 15-3.6 MG lozenge  Commonly known as: CEPACOL SORE THROAT  Take 1 lozenge by mouth every 2 hours as needed for Sore Throat     enoxaparin Sodium 30 MG/0.3ML injection  Commonly known as: LOVENOX  Inject 0.3 mLs into the skin 2 times daily for 14 days     ibuprofen 400 MG tablet  Commonly known as: ADVIL;MOTRIN  Take 1 tablet by mouth every 6 hours as needed for Pain     levETIRAcetam 100 MG/ML solution  Commonly known as: KEPPRA  Take 10 mLs by mouth 2 times daily     tiZANidine 2 MG tablet  Commonly known as: ZANAFLEX  Take 1 tablet by mouth every 8 hours as needed (pain)            CHANGE how you take these medications      furosemide 20 MG tablet  Commonly known as: Lasix  Take 1 tablet by mouth daily  What changed: additional instructions            CONTINUE taking these medications      albuterol sulfate  (90 Base) MCG/ACT inhaler  Commonly known as: PROVENTIL;VENTOLIN;PROAIR  Inhale 2 puffs into the lungs 4 times daily     alendronate 70 MG tablet  Commonly known as: FOSAMAX  Take 1 tablet by mouth every 7 days On Sundays     Anoro Ellipta 62.5-25 MCG/INH Aepb inhaler  Generic drug: umeclidinium-vilanterol  Inhale 1 puff into the lungs daily     ipratropium-albuterol 0.5-2.5 (3) MG/3ML Soln nebulizer solution  Commonly known as: DUONEB     levothyroxine 200 MCG tablet  Commonly known as: SYNTHROID  Take 1 tablet by mouth Daily     pantoprazole sodium 40 MG Pack packet  Commonly known as: PROTONIX  Take 1 packet by mouth in the morning and 1 packet in the evening. Take before meals. pregabalin 300 MG capsule  Commonly known as: LYRICA  Take 1 capsule by mouth 2 times daily for 30 days.      sertraline 50 MG tablet  Commonly known as: ZOLOFT  Take 1 tablet by mouth daily            STOP taking these medications      aspirin 81 MG tablet     dextrose 5 % SOLN with HYDROmorphone HCl PF 10 MG/ML SOLN 1 mg/mL     guaiFENesin-dextromethorphan 100-10 MG/5ML syrup  Commonly known as: ROBITUSSIN DM     HYDROcodone-acetaminophen  MG per tablet  Commonly known as: NORCO     mupirocin 2 % cream  Commonly known as: Bactroban     sulfamethoxazole-trimethoprim 800-160 MG per tablet  Commonly known as: Bactrim DS     Xarelto 20 MG Tabs tablet  Generic drug: rivaroxaban            ASK your doctor about these medications      calcium citrate-vitamin D 315-250 MG-UNIT Tabs per tablet  Commonly known as: CITRICAL + D  Ask about: Which instructions should I use? DULoxetine 60 MG extended release capsule  Commonly known as: CYMBALTA  Ask about: Which instructions should I use?     hydroxychloroquine 200 MG tablet  Commonly known as: PLAQUENIL  Ask about: Which instructions should I use? potassium chloride 20 MEQ packet  Commonly known as: KLOR-CON  Ask about: Which instructions should I use?     traZODone 100 MG tablet  Commonly known as: DESYREL  Ask about: Which instructions should I use? Where to Get Your Medications        You can get these medications from any pharmacy    You don't need a prescription for these medications  ibuprofen 400 MG tablet       Information about where to get these medications is not yet available    Ask your nurse or doctor about these medications  amLODIPine 10 MG tablet  benzocaine-menthol 15-3.6 MG lozenge  enoxaparin Sodium 30 MG/0.3ML injection  levETIRAcetam 100 MG/ML solution  tiZANidine 2 MG tablet       Diet: No diet orders on file diet as tolerated  Activity: As instructed WEIGHT BEARING STATUS: Weight bearing as tolerated  Wound Care: Daily and as needed.     DISPOSITION: Skilled Facility    Follow-up:  Jena Mota MD  [address] [id number] [phone]    Follow up  post hospital follow up    [facility], APRN - [address] [facility] [address]  [facility] [address] [place] [address]  [phone]    Schedule an appointment as soon as possible for a visit  Neurosurgery follow up, For staple removal on or around 9/8/2022. [facility], AN AFFILIATE OF [facility] [place]  [address]  [address] [address] [phone]  Schedule an appointment as soon as possible for a visit in 2 week(s)  As needed- if she does not pass 2nd swallow study and needs G-tube placement.         SIGNED:  RYAN Daniels - CNP   9/6/2022, 11:55 AM  Time Spent for discharge: 35 minutes

## 2022-09-06 NOTE — ED PROVIDER NOTES
(10/25/2004); Cervical disc surgery (2004); Neck surgery (2005); Toe amputation (10/08/2006); Toe amputation (2008); lumbar laminectomy (2008); Toe amputation (10/03/2008); Humerus fracture surgery (Right, 10/03/2010); Knee arthroscopy (Right, 2011); back surgery (2017); knee surgery (Right, 2016); Wrist fracture surgery (Left, 2018); Wrist surgery (Left, 2019); and craniotomy (Left, 2022). Social History     Socioeconomic History    Marital status:      Spouse name: Not on file    Number of children: Not on file    Years of education: Not on file    Highest education level: Not on file   Occupational History    Not on file   Tobacco Use    Smoking status: Former     Packs/day: 1.00     Years: 10.00     Pack years: 10.00     Types: Cigarettes     Quit date: 1976     Years since quittin.8    Smokeless tobacco: Never   Vaping Use    Vaping Use: Never used   Substance and Sexual Activity    Alcohol use: No    Drug use: No    Sexual activity: Not on file   Other Topics Concern    Not on file   Social History Narrative    Not on file     Social Determinants of Health     Financial Resource Strain: Low Risk     Difficulty of Paying Living Expenses: Not hard at all   Food Insecurity: No Food Insecurity    Worried About Running Out of Food in the Last Year: Never true    Ran Out of Food in the Last Year: Never true   Transportation Needs: Not on file   Physical Activity: Inactive    Days of Exercise per Week: 0 days    Minutes of Exercise per Session: 0 min   Stress: Not on file   Social Connections: Not on file   Intimate Partner Violence: Not on file   Housing Stability: Not on file       Family History   Problem Relation Age of Onset    Heart Attack Father 61    Heart Attack Sister     Heart Disease Brother        Allergies:  Patient has no known allergies.     Home Medications:  Prior to Admission medications    Medication Sig Start Date End Date Taking? Authorizing Provider   amLODIPine (NORVASC) 10 MG tablet Take 1 tablet by mouth daily 8/31/22   RYAN Hampton CNP   benzocaine-menthol (CEPACOL SORE THROAT) 15-3.6 MG lozenge Take 1 lozenge by mouth every 2 hours as needed for Sore Throat 8/30/22   RYAN Hampton CNP   enoxaparin Sodium (LOVENOX) 30 MG/0.3ML injection Inject 0.3 mLs into the skin 2 times daily for 14 days 8/30/22 9/13/22  RYAN Hampton CNP   levETIRAcetam (KEPPRA) 100 MG/ML solution Take 10 mLs by mouth 2 times daily 8/30/22   RYAN Hampton CNP   tiZANidine (ZANAFLEX) 2 MG tablet Take 1 tablet by mouth every 8 hours as needed (pain) 8/30/22 9/6/22  RYAN Hampton CNP   ibuprofen (ADVIL;MOTRIN) 400 MG tablet Take 1 tablet by mouth every 6 hours as needed for Pain 8/30/22   RYAN Hampton CNP   DULoxetine (CYMBALTA) 60 MG extended release capsule Take 60 mg by mouth daily    Historical Provider, MD   traZODone (DESYREL) 100 MG tablet Take 200 mg by mouth nightly    Historical Provider, MD   hydroxychloroquine (PLAQUENIL) 200 MG tablet Take 300 mg by mouth daily    Historical Provider, MD   calcium citrate-vitamin D (CITRICAL + D) 315-250 MG-UNIT TABS per tablet Take 1 tablet by mouth 2 times daily (with meals)    Historical Provider, MD   potassium chloride (KLOR-CON) 20 MEQ packet Take 20 mEq by mouth 2 times daily    Historical Provider, MD   alendronate (FOSAMAX) 70 MG tablet Take 1 tablet by mouth every 7 days On Sundays 8/15/22 11/13/22  Rona Salazar MD   pantoprazole sodium (PROTONIX) 40 MG PACK packet Take 1 packet by mouth in the morning and 1 packet in the evening. Take before meals.  8/15/22   Rona Salazar MD   sertraline (ZOLOFT) 50 MG tablet Take 1 tablet by mouth daily 6/27/22   Rona Salazar MD   levothyroxine (SYNTHROID) 200 MCG tablet Take 1 tablet by mouth Daily 6/27/22   Rona Salazar MD   XARELTO 20 MG TABS tablet TAKE 1 TABLET BY MOUTH  DAILY WITH BREAKFAST 4/8/22 8/30/22  Troy Butcher MD   ipratropium-albuterol (DUONEB) 0.5-2.5 (3) MG/3ML SOLN nebulizer solution 3 mLs  Patient not taking: No sig reported 11/24/21   Historical Provider, MD barlowlidinium-vilanterol (ANORO ELLIPTA) 62.5-25 MCG/INH AEPB inhaler Inhale 1 puff into the lungs daily 3/16/22   [facility] RYAN - CNP   albuterol sulfate  (90 Base) MCG/ACT inhaler Inhale 2 puffs into the lungs 4 times daily 3/16/22   [facility]YAN - CNP   furosemide (LASIX) 20 MG tablet Take 1 tablet by mouth daily  Patient taking differently: Take 20 mg by mouth daily prn 5/14/21   Troy Butcher MD   pregabalin (LYRICA) 300 MG capsule Take 1 capsule by mouth 2 times daily for 30 days. 11/6/20 8/22/22  Georgette Silver MD   aspirin 81 MG tablet Take 81 mg by mouth daily  8/29/22  Historical Provider, MD       REVIEW OF SYSTEMS    (2-9 systems for level 4, 10 or more for level 5)      Review of Systems   Constitutional:  Negative for activity change, chills and fever. Respiratory:  Negative for cough and shortness of breath. Cardiovascular:  Negative for chest pain. Gastrointestinal:  Negative for abdominal pain. Genitourinary:  Negative for difficulty urinating. Neurological:  Negative for syncope and headaches. Psychiatric/Behavioral:  Negative for confusion. PHYSICAL EXAM   (up to 7 for level 4, 8 or more for level 5)      INITIAL VITALS:   BP (!) 140/87   Pulse 66   Temp 97.6 °F (36.4 °C) (Oral)   Resp 16   Ht 5' 5\" (1.651 m)   Wt 157 lb (71.2 kg)   SpO2 97%   BMI 26.13 kg/m²     Physical Exam  HENT:      Head: Normocephalic. Comments: Craniotomy scar well-healing  Eyes:      Extraocular Movements: Extraocular movements intact. Pupils: Pupils are equal, round, and reactive to light. Cardiovascular:      Rate and Rhythm: Normal rate and regular rhythm. Pulses: Normal pulses. Heart sounds: Normal heart sounds.    Pulmonary:      Effort: Pulmonary effort is normal.      Breath sounds: Normal breath sounds. Abdominal:      Palpations: Abdomen is soft. Tenderness: There is no abdominal tenderness. There is no guarding or rebound. Comments: Pain pump in place   Skin:     Capillary Refill: Capillary refill takes less than 2 seconds. Neurological:      Mental Status: She is alert. DIFFERENTIAL  DIAGNOSIS     PLAN (LABS / IMAGING / EKG):  Orders Placed This Encounter   Procedures    IR PLACE NG TUBE BY DR Mango Matthews    Inpatient consult to General Surgery    POC Glucose Fingerstick       MEDICATIONS ORDERED:  Orders Placed This Encounter   Medications    DISCONTD: acetaminophen (TYLENOL) tablet 1,000 mg    fentaNYL (SUBLIMAZE) injection 50 mcg    iopamidol (ISOVUE-370) 76 % injection 100 mL    DISCONTD: acetaminophen (TYLENOL) tablet 650 mg    fentaNYL (SUBLIMAZE) injection 50 mcg    acetaminophen (TYLENOL) 160 MG/5ML solution 500 mg       DDX: NG-tube complication    MDM: 69 y.o. female presents today with G-tube complication, general surgery consulted on arrival.  Recommend IR adjustment of NG tube. NG tube bridled by general surgery after being adjusted by IR. Currently waiting on transport back to [facility] in [place]. Patient care signed out to Dr Jaimie Alejandra Opening: Spontaneous  Best Verbal Response: Confused  Best Motor Response: Obeys commands  Kristy Coma Scale Score: 14  DIAGNOSTIC RESULTS / EMERGENCY DEPARTMENT COURSE / MDM   LAB RESULTS:  Results for orders placed or performed during the hospital encounter of 09/03/22   POC Glucose Fingerstick   Result Value Ref Range    POC Glucose 92 65 - 105 mg/dL           RADIOLOGY:  IR PLACE NG TUBE BY DR Mango Matthews   Final Result   Successful advancement enteric tube, as above. Tube is ready for use at this   time. EMERGENCY DEPARTMENT COURSE:  ED Course as of 09/06/22 1954   Sat Sep 03, 2022   1145 Per general surgery will contact IR for NG tube placement. [SS]      ED Course User Index  [SS] Mary Beth Snow MD        PROCEDURES:  none    CONSULTS:  IP CONSULT TO GENERAL SURGERY    CRITICAL CARE:  none    FINAL IMPRESSION      1.  Encounter for nasogastric (NG) tube placement          DISPOSITION / PLAN     DISPOSITION Decision To Discharge 09/03/2022 07:02:39 PM      PATIENT REFERRED TO:  Malika Allen MD  [address] [address]  [phone]    Schedule an appointment as soon as possible for a visit   As needed      DISCHARGE MEDICATIONS:  Discharge Medication List as of 9/3/2022  7:03 PM          Mary Beth Snow MD  Emergency Medicine Resident    (Please note that portions of thisnote were completed with a voice recognition program.  Efforts were made to edit the dictations but occasionally words are mis-transcribed.)       Mary Beth Snow MD  Resident  09/06/22 [id number]

## 2022-09-06 NOTE — ED PROVIDER NOTES
Ran Out of Food in the Last Year: Never true   Transportation Needs: Not on file   Physical Activity: Inactive    Days of Exercise per Week: 0 days    Minutes of Exercise per Session: 0 min   Stress: Not on file   Social Connections: Not on file   Intimate Partner Violence: Not on file   Housing Stability: Not on file       Family History   Problem Relation Age of Onset    Heart Attack Father 61    Heart Attack Sister     Heart Disease Brother        Allergies:  Patient has no known allergies. Home Medications:  Prior to Admission medications    Medication Sig Start Date End Date Taking?  Authorizing Provider   amLODIPine (NORVASC) 10 MG tablet Take 1 tablet by mouth daily 8/31/22  Yes Jamilah Miners, APRN - CNP   benzocaine-menthol (CEPACOL SORE THROAT) 15-3.6 MG lozenge Take 1 lozenge by mouth every 2 hours as needed for Sore Throat 8/30/22  Yes Jamilah Miners, APRN - CNP   enoxaparin Sodium (LOVENOX) 30 MG/0.3ML injection Inject 0.3 mLs into the skin 2 times daily for 14 days 8/30/22 9/13/22 Yes Jamilah Miners, APRN - CNP   levETIRAcetam (KEPPRA) 100 MG/ML solution Take 10 mLs by mouth 2 times daily 8/30/22  Yes Jamilah Miners, APRN - CNP   tiZANidine (ZANAFLEX) 2 MG tablet Take 1 tablet by mouth every 8 hours as needed (pain) 8/30/22 9/6/22 Yes Jamilah Miners, APRN - CNP   ibuprofen (ADVIL;MOTRIN) 400 MG tablet Take 1 tablet by mouth every 6 hours as needed for Pain 8/30/22  Yes Jamilah Miners, APRN - CNP   DULoxetine (CYMBALTA) 60 MG extended release capsule Take 60 mg by mouth daily   Yes Historical Provider, MD   traZODone (DESYREL) 100 MG tablet Take 200 mg by mouth nightly   Yes Historical Provider, MD   hydroxychloroquine (PLAQUENIL) 200 MG tablet Take 300 mg by mouth daily   Yes Historical Provider, MD   calcium citrate-vitamin D (CITRICAL + D) 315-250 MG-UNIT TABS per tablet Take 1 tablet by mouth 2 times daily (with meals)   Yes Historical Provider, MD   potassium chloride (KLOR-CON) 20 MEQ packet Take 20 mEq by mouth 2 times daily   Yes Historical Provider, MD   alendronate (FOSAMAX) 70 MG tablet Take 1 tablet by mouth every 7 days On Sundays 8/15/22 11/13/22  Paul Penn MD   pantoprazole sodium (PROTONIX) 40 MG PACK packet Take 1 packet by mouth in the morning and 1 packet in the evening. Take before meals. 8/15/22   Paul Penn MD   sertraline (ZOLOFT) 50 MG tablet Take 1 tablet by mouth daily 6/27/22   Paul Penn MD   levothyroxine (SYNTHROID) 200 MCG tablet Take 1 tablet by mouth Daily 6/27/22   Paul Penn MD   XARELTO 20 MG TABS tablet TAKE 1 TABLET BY MOUTH  DAILY WITH BREAKFAST 4/8/22 8/30/22  Kanwal Briscoe MD   ipratropium-albuterol (DUONEB) 0.5-2.5 (3) MG/3ML SOLN nebulizer solution 3 mLs  Patient not taking: No sig reported 11/24/21   Historical Provider, MD   umeclidinium-vilanterol (ANORO ELLIPTA) 62.5-25 MCG/INH AEPB inhaler Inhale 1 puff into the lungs daily 3/16/22   Alexandre RYAN Condon CNP   albuterol sulfate  (90 Base) MCG/ACT inhaler Inhale 2 puffs into the lungs 4 times daily 3/16/22   AlexandreRYAN Patel CNP   furosemide (LASIX) 20 MG tablet Take 1 tablet by mouth daily  Patient taking differently: Take 20 mg by mouth daily prn 5/14/21   Kanwal Briscoe MD   pregabalin (LYRICA) 300 MG capsule Take 1 capsule by mouth 2 times daily for 30 days. 11/6/20 8/22/22  Que Woo MD   aspirin 81 MG tablet Take 81 mg by mouth daily  8/29/22  Historical Provider, MD       REVIEW OF SYSTEMS    (2-9 systems for level 4, 10 or more for level 5)      Review of Systems   Unable to perform ROS: Acuity of condition     PHYSICAL EXAM   (up to 7 for level 4, 8 or more for level 5)      INITIAL VITALS:   BP (!) 157/80   Pulse 68   Temp 97.9 °F (36.6 °C) (Oral)   Resp 18   Ht 5' 5\" (1.651 m)   Wt 157 lb 3 oz (71.3 kg)   SpO2 95%   BMI 26.16 kg/m²     Physical Exam  Vitals reviewed. HENT:      Head: Normocephalic. Comments: Right periorbital bruising. Pupils 3 mm equal round and reactive, no conjunctival injection or hemorrhage. No hemotympanum. No vergara sign. Right Ear: Tympanic membrane normal.      Left Ear: Tympanic membrane normal.      Nose: Nose normal.      Mouth/Throat:      Mouth: Mucous membranes are dry. Pharynx: Oropharynx is clear. Eyes:      Extraocular Movements: Extraocular movements intact. Pupils: Pupils are equal, round, and reactive to light. Cardiovascular:      Rate and Rhythm: Normal rate and regular rhythm. Pulses: Normal pulses. Heart sounds: Normal heart sounds. Pulmonary:      Effort: Pulmonary effort is normal.      Breath sounds: Normal breath sounds. No decreased breath sounds, wheezing, rhonchi or rales. Musculoskeletal:         General: Normal range of motion. Cervical back: Normal range of motion and neck supple. Neurological:      General: No focal deficit present. Mental Status: She is alert and oriented to person, place, and time.    Remainder of physical examination by trauma surgery team at beside on arrival, please refer to trauma surgery progress note    DIFFERENTIAL  DIAGNOSIS     PLAN (LABS / IMAGING / EKG):  Orders Placed This Encounter   Procedures    MRSA DNA Probe, Nasal    Culture, Urine    COVID-19, Rapid    CT HEAD WO CONTRAST    CT HEAD WO CONTRAST    XR CHEST (SINGLE VIEW FRONTAL)    CT Head wo Contrast    XR CHEST PORTABLE    XR ABDOMEN FOR NG/OG/NE TUBE PLACEMENT    XR ABDOMEN FOR NG/OG/NE TUBE PLACEMENT    XR ABDOMEN FOR NG/OG/NE TUBE PLACEMENT    CT HEAD WO CONTRAST    FL MODIFIED BARIUM SWALLOW W VIDEO    Trauma Panel    Urine Drug Screen    Urinalysis    TEG Global Hemostasis with Lysis    Calcium, Ionized    Calcium, Ionized    APTT    Protime-INR    APTT    Blood Gas, Arterial    Calcium, Ionized    CBC with Auto Differential    Magnesium    Phosphorus    Phosphorus    Protime-INR    Basic Metabolic Panel w/ Reflex to MG    Triglyceride    Basic Metabolic Panel Magnesium    Phosphorus    Immature Platelet Fraction    Discontinue indwelling urinary catheter    Catheter removal    Inpatient consult to Neurosurgery    Inpatient consult to Dietitian    OT eval and treat    PT eval and treat    Extubation    POC Global Hemostasis TEG w/Lysis    POC Glucose Fingerstick    POC Glucose Fingerstick    Arterial Blood Gas, POC    Lactic Acid, POC    POCT Glucose    Arterial Blood Gas, POC    Lactic Acid, POC    POCT Glucose    POC Glucose Fingerstick    POC Glucose Fingerstick    POC Glucose Fingerstick    POC Glucose Fingerstick    POC Glucose Fingerstick    Arterial Blood Gas, POC    Lactic Acid, POC    POCT Glucose    POC Glucose Fingerstick    POC Glucose Fingerstick    POC Glucose Fingerstick    POC Glucose Fingerstick    POC Glucose Fingerstick    POC Glucose Fingerstick    POC Glucose Fingerstick    POC Glucose Fingerstick    POC Glucose Fingerstick    POC Glucose Fingerstick    POC Glucose Fingerstick    EKG 12 Lead    Type and Screen    EEG video monitoring    ADMIT TO INPATIENT    ADMIT TO INPATIENT    Transfer patient post-op    Transfer patient    Transfer patient    Transfer patient    Discharge patient       MEDICATIONS ORDERED:  Orders Placed This Encounter   Medications    levETIRAcetam (KEPPRA) 1000 mg/100 mL IVPB    DISCONTD: sodium chloride flush 0.9 % injection 5-40 mL    DISCONTD: sodium chloride flush 0.9 % injection 5-40 mL    DISCONTD: 0.9 % sodium chloride infusion    DISCONTD: ondansetron (ZOFRAN-ODT) disintegrating tablet 4 mg    DISCONTD: ondansetron (ZOFRAN) injection 4 mg    DISCONTD: polyethylene glycol (GLYCOLAX) packet 17 g    DISCONTD: 0.9 % sodium chloride infusion    DISCONTD: acetaminophen (TYLENOL) tablet 1,000 mg    DISCONTD: famotidine (PEPCID) tablet 20 mg    DISCONTD: oxyCODONE (ROXICODONE) immediate release tablet 2.5 mg    DISCONTD: levETIRAcetam (KEPPRA) 500 mg/100 mL IVPB    FOLLOWED BY Linked Order Group     prothrombin complex concentrate (human) (KCENTRA) infusion 2,000 Units     0.9 % sodium chloride infusion    desmopressin (DDAVP) 26.32 mcg in sodium chloride 0.9 % 50 mL IVPB    DISCONTD: scopolamine (TRANSDERM-SCOP) transdermal patch 1 patch    calcium gluconate 1000 mg in sodium chloride 50 mL    prochlorperazine (COMPAZINE) injection 5 mg    DISCONTD: hydrALAZINE (APRESOLINE) injection 5 mg    DISCONTD: labetalol (NORMODYNE;TRANDATE) injection 10 mg    DISCONTD: albuterol sulfate HFA (PROVENTIL;VENTOLIN;PROAIR) 108 (90 Base) MCG/ACT inhaler 2 puff     Order Specific Question:   Initiate RT Bronchodilator Protocol     Answer:   No    DISCONTD: levothyroxine (SYNTHROID) tablet 200 mcg    DISCONTD: pantoprazole (PROTONIX) tablet 40 mg     Refill:  3    DISCONTD: pregabalin (LYRICA) capsule 300 mg    potassium bicarb-citric acid (EFFER-K) effervescent tablet 40 mEq    magnesium oxide (MAG-OX) tablet 400 mg    DISCONTD: niCARdipine (CARDENE) 25 mg in sodium chloride 0.9 % 250 mL infusion     Order Specific Question:   Titrate Infusion? Answer:   Yes     Order Specific Question:   Initial Infusion Rate: Answer:   5 mg/hr     Order Specific Question:   Goal of Therapy is:      Answer:   SBP less than 140 mmHg     Order Specific Question:   Contact Provider if:     Answer:   Patient is receiving the maximum dose and is not achieving the goal of therapy    sodium chloride 0.9 % irrigation    DISCONTD: gelatin adsorbable (GELFOAM) sponge    DISCONTD: lidocaine-EPINEPHrine 1 %-1:[id number] injection    DISCONTD: thrombin kit    DISCONTD: vancomycin (VANCOCIN) injection    DISCONTD: bacitracin ointment    DISCONTD: sodium chloride flush 0.9 % injection 5-40 mL    DISCONTD: sodium chloride flush 0.9 % injection 5-40 mL    DISCONTD: 0.9 % sodium chloride infusion    DISCONTD: morphine (PF) injection 2 mg    DISCONTD: morphine injection 4 mg    DISCONTD: 0.9 % sodium chloride infusion    DISCONTD: acetaminophen (TYLENOL) tablet 650 mg DISCONTD: oxyCODONE (ROXICODONE) immediate release tablet 5 mg    DISCONTD: oxyCODONE (ROXICODONE) immediate release tablet 10 mg    DISCONTD: sennosides-docusate sodium (SENOKOT-S) 8.6-50 MG tablet 1 tablet    DISCONTD: magnesium hydroxide (MILK OF MAGNESIA) 400 MG/5ML suspension 30 mL    DISCONTD: bisacodyl (DULCOLAX) suppository 10 mg    DISCONTD: ondansetron (ZOFRAN-ODT) disintegrating tablet 4 mg    DISCONTD: ondansetron (ZOFRAN) injection 4 mg    ceFAZolin (ANCEF) 2000 mg in sterile water 20 mL IV syringe     Order Specific Question:   Antimicrobial Indications     Answer:   Surgical Prophylaxis    DISCONTD: oxyCODONE (ROXICODONE) immediate release tablet 5 mg    DISCONTD: fentaNYL (SUBLIMAZE) injection 25 mcg    DISCONTD: propofol injection     Order Specific Question:   Titrate Infusion? Answer:   Yes     Order Specific Question:   Initial Infusion Dose: Answer:   20 mcg/kg/min     Order Specific Question:   Goal of Therapy:     Answer:   RASS of -1 to 0     Order Specific Question:   Contact Provider if:     Answer:   New onset HR less than 50 bpm     Order Specific Question:   Contact Provider if:     Answer:   New onset SBP less than 90 mmHg     Order Specific Question:   Contact Provider if:     Answer:   Patient is receiving maximum dose and is not achieving the goal of therapy     Order Specific Question:   Contact Provider if:     Answer:   Triglycerides greater than 500 mg/dL    DISCONTD: fentaNYL 50 mcg/mL 50 mL infusion     Order Specific Question:   Titrate Infusion? Answer:   Yes     Order Specific Question:   Initial Infusion Dose: Answer:   48 mcg/hr     Order Specific Question:   Goal of Therapy is:      Answer:   RASS of -1 to 1     Order Specific Question:   Contact Provider if:     Answer:   Patient is receiving the maximum dose and is not achieving the goal of therapy    hydrALAZINE (APRESOLINE) 20 MG/ML injection     Frank Adams: nuria Shirley Yuly: Order Specific Question:   Titrate Infusion? Answer:   Yes     Order Specific Question:   Initial Infusion Dose: Answer:   0.2 mcg/kg/hr     Order Specific Question:   Goal of Therapy:     Answer:   RASS of -1 to 0     Order Specific Question:   Contact Provider if:     Answer:   New onset HR less than 50 bpm     Order Specific Question:   Contact Provider if:     Answer:   New onset SBP less than 90 mmHg     Order Specific Question:   Contact Provider if:     Answer:   Patient is receiving maximum dose and is not achieving the goal of therapy    DISCONTD: ketamine (KETALAR) 500 mg in sodium chloride 0.9 % 250 mL infusion     Order Specific Question:   Titrate infusion? Answer:   No     Order Specific Question:   Infusion Dose: Answer:   0.2 mg/kg/hr    DISCONTD: OLANZapine (ZYPREXA) tablet 5 mg    DISCONTD: enoxaparin Sodium (LOVENOX) injection 30 mg     Order Specific Question:   Indication of Use     Answer:   Prophylaxis-DVT/PE    OLANZapine (ZYPREXA) tablet 5 mg    DISCONTD: OLANZapine (ZYPREXA) tablet 5 mg    DISCONTD: ketamine 500 mg in 0.9% sodium chloride 250 ml infusion     Order Specific Question:   Titrate infusion? Answer:   No     Order Specific Question:   Infusion Dose:      Answer:   0.2 mg/kg/hr    DISCONTD: levETIRAcetam (KEPPRA) 100 MG/ML solution 1,000 mg    hydrALAZINE (APRESOLINE) injection 10 mg    DISCONTD: amLODIPine (NORVASC) tablet 10 mg    oxyCODONE (ROXICODONE) immediate release tablet 2.5 mg    oxyCODONE (ROXICODONE) immediate release tablet 2.5 mg    potassium bicarb-citric acid (EFFER-K) effervescent tablet 40 mEq    oxyCODONE (ROXICODONE) immediate release tablet 2.5 mg    DISCONTD: methocarbamol (ROBAXIN) tablet 750 mg    DISCONTD: methocarbamol (ROBAXIN) tablet 750 mg    DISCONTD: potassium chloride 20 MEQ/15ML (10%) oral solution 20 mEq    DISCONTD: ipratropium-albuterol (DUONEB) nebulizer solution 3 mL     Order Specific Question:   Initiate RT Bronchodilator Protocol     Answer:   No    DISCONTD: acetaminophen (TYLENOL) tablet 1,000 mg    DISCONTD: tiotropium-olodaterol (STIOLTO) 2.5-2.5 MCG/ACT inhaler 2 puff     Refill:  3    DISCONTD: furosemide (LASIX) tablet 20 mg    acetaminophen (TYLENOL) tablet 650 mg    DISCONTD: methocarbamol (ROBAXIN) tablet 500 mg    DISCONTD: benzocaine-menthol (CEPACOL SORE THROAT) lozenge 1 lozenge    amLODIPine (NORVASC) 10 MG tablet     Sig: Take 1 tablet by mouth daily     Dispense:  30 tablet     Refill:  3    benzocaine-menthol (CEPACOL SORE THROAT) 15-3.6 MG lozenge     Sig: Take 1 lozenge by mouth every 2 hours as needed for Sore Throat     Dispense:  168 lozenge    enoxaparin Sodium (LOVENOX) 30 MG/0.3ML injection     Sig: Inject 0.3 mLs into the skin 2 times daily for 14 days     Dispense:  8.4 mL     Refill:  0    levETIRAcetam (KEPPRA) 100 MG/ML solution     Sig: Take 10 mLs by mouth 2 times daily     Refill:  3    tiZANidine (ZANAFLEX) 2 MG tablet     Sig: Take 1 tablet by mouth every 8 hours as needed (pain)    DISCONTD: ibuprofen (ADVIL;MOTRIN) tablet 400 mg    ibuprofen (ADVIL;MOTRIN) 400 MG tablet     Sig: Take 1 tablet by mouth every 6 hours as needed for Pain     Dispense:  120 tablet     Refill:  3       DDX: Subdural, epidural, subarachnoid, skull fracture, closed head injury, concussion    DIAGNOSTIC RESULTS / [address] [place] [address] / [facility]   LAB RESULTS:  Results for orders placed or performed during the hospital encounter of 08/22/22   MRSA DNA Probe, Nasal    Specimen: Nasal   Result Value Ref Range    Specimen Description . NASAL SWAB     MRSA, DNA, Nasal NEGATIVE NEGATIVE   Culture, Urine    Specimen: Urine, indwelling catheter   Result Value Ref Range    Specimen Description . INDWELLING CATH URINE     Culture NO GROWTH    COVID-19, Rapid    Specimen: Nasopharyngeal Swab   Result Value Ref Range    Specimen Description . NASOPHARYNGEAL SWAB     SARS-CoV-2, Rapid Not Detected Not Detected   Trauma Panel   Result Value Ref Range    Ethanol <10 <10 mg/dL    Ethanol percent <0.010 <0.010 %    Blood Bank Specimen BILL FOR SERVICES PERFORMED     BUN 12 8 - 23 mg/dL    WBC 6.4 3.5 - 11.3 k/uL    RBC 4.40 3.95 - 5.11 m/uL    Hemoglobin 13.6 11.9 - 15.1 g/dL    Hematocrit 43.2 36.3 - 47.1 %    MCV 98.2 82.6 - 102.9 fL    MCH 30.9 25.2 - 33.5 pg    MCHC 31.5 28.4 - 34.8 g/dL    RDW 13.7 11.8 - 14.4 %    Platelets 603 853 - 150 k/uL    MPV 9.9 8.1 - 13.5 fL    NRBC Automated 0.0 0.0 per 100 WBC    Creatinine 0.56 0.50 - 0.90 mg/dL    GFR Non-African American >60 >60 mL/min    GFR African American >60 >60 mL/min    GFR Comment          Glucose 105 (H) 70 - 99 mg/dL    hCG Qual NEGATIVE NEGATIVE    Sodium 136 135 - 144 mmol/L    Potassium 3.6 (L) 3.7 - 5.3 mmol/L    Chloride 100 98 - 107 mmol/L    CO2 26 20 - 31 mmol/L    Anion Gap 10 9 - 17 mmol/L    Protime 10.6 9.1 - 12.3 sec    INR 1.0     PTT 27.6 20.5 - 30.5 sec    pH, Micheal 7.415 7.320 - 7.420    pCO2, Micheal 41.8 39 - 55    pO2, Micheal 56.3 (H) 30 - 50    HCO3, Venous 26.3 24 - 30 mmol/L    Positive Base Excess, Micheal 2.0 0.0 - 2.0 mmol/L    O2 Sat, Micheal 88.2 (H) 60.0 - 85.0 %    Carboxyhemoglobin 0.8 0 - 5 %    Pt Temp 37.0     FIO2 INFORMATION NOT PROVIDED    Urine Drug Screen   Result Value Ref Range    Amphetamine Screen, Ur NEGATIVE NEGATIVE    Barbiturate Screen, Ur NEGATIVE NEGATIVE    Benzodiazepine Screen, Urine POSITIVE (A) NEGATIVE    Cocaine Metabolite, Urine NEGATIVE NEGATIVE    Methadone Screen, Urine NEGATIVE NEGATIVE    Opiates, Urine POSITIVE (A) NEGATIVE    Phencyclidine, Urine NEGATIVE NEGATIVE    Cannabinoid Scrn, Ur NEGATIVE NEGATIVE    Oxycodone Screen, Ur POSITIVE (A) NEGATIVE    Fentanyl, Ur NEGATIVE NEGATIVE    Test Information       Assay provides medical screening only. The absence of expected drug(s) and/or metabolite(s) may indicate diluted or adulterated urine, limitations of testing or timing of collection.    Urinalysis   Result Value Ref Range    Color, UA %    Basophils 1 0 - 2 %    Immature Granulocytes 0 0 %    Segs Absolute 3.85 1.50 - 8.10 k/uL    Absolute Lymph # 0.91 (L) 1.10 - 3.70 k/uL    Absolute Mono # 0.49 0.10 - 1.20 k/uL    Absolute Eos # 0.04 0.00 - 0.44 k/uL    Basophils Absolute 0.05 0.00 - 0.20 k/uL    Absolute Immature Granulocyte <0.03 0.00 - 0.30 k/uL   Calcium, Ionized   Result Value Ref Range    Calcium, Ionized 1.19 1.13 - 1.33 mmol/L   APTT   Result Value Ref Range    PTT 19.5 (L) 20.5 - 30.5 sec   Protime-INR   Result Value Ref Range    Protime 10.1 9.1 - 12.3 sec    INR 0.9    APTT   Result Value Ref Range    PTT 25.0 20.5 - 30.5 sec   Basic Metabolic Panel w/ Reflex to MG   Result Value Ref Range    Glucose 127 (H) 70 - 99 mg/dL    BUN 10 8 - 23 mg/dL    Creatinine 0.36 (L) 0.50 - 0.90 mg/dL    Calcium 8.7 8.6 - 10.4 mg/dL    Sodium 132 (L) 135 - 144 mmol/L    Potassium 3.6 (L) 3.7 - 5.3 mmol/L    Chloride 99 98 - 107 mmol/L    CO2 24 20 - 31 mmol/L    Anion Gap 9 9 - 17 mmol/L    GFR Non-African American >60 >60 mL/min    GFR African American >60 >60 mL/min    GFR Comment         Calcium, Ionized   Result Value Ref Range    Calcium, Ionized 1.14 1.13 - 1.33 mmol/L   Calcium, Ionized   Result Value Ref Range    Calcium, Ionized 1.16 1.13 - 1.33 mmol/L   CBC with Auto Differential   Result Value Ref Range    WBC 11.3 3.5 - 11.3 k/uL    RBC 3.85 (L) 3.95 - 5.11 m/uL    Hemoglobin 12.6 11.9 - 15.1 g/dL    Hematocrit 36.7 36.3 - 47.1 %    MCV 95.3 82.6 - 102.9 fL    MCH 32.7 25.2 - 33.5 pg    MCHC 34.3 28.4 - 34.8 g/dL    RDW 13.8 11.8 - 14.4 %    Platelets 209 090 - 178 k/uL    MPV 9.7 8.1 - 13.5 fL    NRBC Automated 0.0 0.0 per 100 WBC    Seg Neutrophils 82 (H) 36 - 65 %    Lymphocytes 6 (L) 24 - 43 %    Monocytes 11 3 - 12 %    Eosinophils % 0 (L) 1 - 4 %    Basophils 0 0 - 2 %    Immature Granulocytes 0 0 %    Segs Absolute 9.29 (H) 1.50 - 8.10 k/uL    Absolute Lymph # 0.73 (L) 1.10 - 3.70 k/uL    Absolute Mono # 1.23 (H) 0.10 - 1.20 k/uL Absolute Eos # <0.03 0.00 - 0.44 k/uL    Basophils Absolute <0.03 0.00 - 0.20 k/uL    Absolute Immature Granulocyte 0.05 0.00 - 0.30 k/uL   CBC with Auto Differential   Result Value Ref Range    WBC 6.7 3.5 - 11.3 k/uL    RBC 3.81 (L) 3.95 - 5.11 m/uL    Hemoglobin 11.9 11.9 - 15.1 g/dL    Hematocrit 37.0 36.3 - 47.1 %    MCV 97.1 82.6 - 102.9 fL    MCH 31.2 25.2 - 33.5 pg    MCHC 32.2 28.4 - 34.8 g/dL    RDW 13.7 11.8 - 14.4 %    Platelets 179 656 - 808 k/uL    MPV 9.6 8.1 - 13.5 fL    NRBC Automated 0.0 0.0 per 100 WBC    Immature Granulocytes 0 0 %    Seg Neutrophils 81 (H) 36 - 66 %    Lymphocytes 12 (L) 24 - 44 %    Monocytes 7 1 - 7 %    Eosinophils % 0 (L) 1 - 4 %    Basophils 0 0 - 2 %    Absolute Immature Granulocyte 0.00 0.00 - 0.30 k/uL    Segs Absolute 5.43 1.8 - 7.7 k/uL    Absolute Lymph # 0.80 (L) 1.0 - 4.8 k/uL    Absolute Mono # 0.47 0.1 - 0.8 k/uL    Absolute Eos # 0.00 0.0 - 0.4 k/uL    Basophils Absolute 0.00 0.0 - 0.2 k/uL    Morphology Normal    Magnesium   Result Value Ref Range    Magnesium 2.3 1.6 - 2.6 mg/dL   Magnesium   Result Value Ref Range    Magnesium 1.6 1.6 - 2.6 mg/dL   Phosphorus   Result Value Ref Range    Phosphorus 2.5 (L) 2.6 - 4.5 mg/dL   Phosphorus   Result Value Ref Range    Phosphorus 2.8 2.6 - 4.5 mg/dL   Protime-INR   Result Value Ref Range    Protime 10.7 9.1 - 12.3 sec    INR 1.0    Basic Metabolic Panel w/ Reflex to MG   Result Value Ref Range    Glucose 122 (H) 70 - 99 mg/dL    BUN 9 8 - 23 mg/dL    Creatinine 0.31 (L) 0.50 - 0.90 mg/dL    Calcium 8.7 8.6 - 10.4 mg/dL    Sodium 134 (L) 135 - 144 mmol/L    Potassium 3.4 (L) 3.7 - 5.3 mmol/L    Chloride 100 98 - 107 mmol/L    CO2 24 20 - 31 mmol/L    Anion Gap 10 9 - 17 mmol/L    GFR Non-African American >60 >60 mL/min    GFR African American >60 >60 mL/min    GFR Comment         Basic Metabolic Panel w/ Reflex to MG   Result Value Ref Range    Glucose 115 (H) 70 - 99 mg/dL    BUN 10 8 - 23 mg/dL    Creatinine 0.35 (L) 0.50 - 0.90 mg/dL    Calcium 8.1 (L) 8.6 - 10.4 mg/dL    Sodium 137 135 - 144 mmol/L    Potassium 3.5 (L) 3.7 - 5.3 mmol/L    Chloride 106 98 - 107 mmol/L    CO2 24 20 - 31 mmol/L    Anion Gap 7 (L) 9 - 17 mmol/L    GFR Non-African American >60 >60 mL/min    GFR African American >60 >60 mL/min    GFR Comment         Calcium, Ionized   Result Value Ref Range    Calcium, Ionized 1.14 1.13 - 1.33 mmol/L   CBC with Auto Differential   Result Value Ref Range    WBC 7.4 3.5 - 11.3 k/uL    RBC 3.23 (L) 3.95 - 5.11 m/uL    Hemoglobin 10.5 (L) 11.9 - 15.1 g/dL    Hematocrit 31.2 (L) 36.3 - 47.1 %    MCV 96.6 82.6 - 102.9 fL    MCH 32.5 25.2 - 33.5 pg    MCHC 33.7 28.4 - 34.8 g/dL    RDW 14.4 11.8 - 14.4 %    Platelets 551 439 - 542 k/uL    MPV 10.3 8.1 - 13.5 fL    NRBC Automated 0.0 0.0 per 100 WBC    Immature Granulocytes 0 0 %    Seg Neutrophils 78 (H) 36 - 65 %    Lymphocytes 8 (L) 24 - 43 %    Monocytes 13 (H) 3 - 12 %    Eosinophils % 1 1 - 4 %    Basophils 0 0 - 2 %    Absolute Immature Granulocyte 0.00 0.00 - 0.30 k/uL    Segs Absolute 5.78 1.50 - 8.10 k/uL    Absolute Lymph # 0.59 (L) 1.10 - 3.70 k/uL    Absolute Mono # 0.96 0.10 - 1.20 k/uL    Absolute Eos # 0.07 0.00 - 0.44 k/uL    Basophils Absolute 0.00 0.00 - 0.20 k/uL    Morphology Normal    Magnesium   Result Value Ref Range    Magnesium 1.8 1.6 - 2.6 mg/dL   Phosphorus   Result Value Ref Range    Phosphorus 1.5 (L) 2.6 - 4.5 mg/dL   Triglyceride   Result Value Ref Range    Triglycerides 84 <150 mg/dL   Basic Metabolic Panel   Result Value Ref Range    Glucose 108 (H) 70 - 99 mg/dL    BUN 8 8 - 23 mg/dL    Creatinine 0.31 (L) 0.50 - 0.90 mg/dL    Calcium 8.1 (L) 8.6 - 10.4 mg/dL    Sodium 139 135 - 144 mmol/L    Potassium 3.9 3.7 - 5.3 mmol/L    Chloride 106 98 - 107 mmol/L    CO2 28 20 - 31 mmol/L    Anion Gap 5 (L) 9 - 17 mmol/L    GFR Non-African American >60 >60 mL/min    GFR African American >60 >60 mL/min    GFR Comment         Magnesium   Result Value Ref Range    Magnesium 2.0 1.6 - 2.6 mg/dL   Phosphorus   Result Value Ref Range    Phosphorus 3.3 2.6 - 4.5 mg/dL   Basic Metabolic Panel w/ Reflex to MG   Result Value Ref Range    Glucose 129 (H) 70 - 99 mg/dL    BUN 8 8 - 23 mg/dL    Creatinine 0.33 (L) 0.50 - 0.90 mg/dL    Calcium 8.5 (L) 8.6 - 10.4 mg/dL    Sodium 140 135 - 144 mmol/L    Potassium 3.8 3.7 - 5.3 mmol/L    Chloride 105 98 - 107 mmol/L    CO2 25 20 - 31 mmol/L    Anion Gap 10 9 - 17 mmol/L    GFR Non-African American >60 >60 mL/min    GFR African American >60 >60 mL/min    GFR Comment         Calcium, Ionized   Result Value Ref Range    Calcium, Ionized 1.14 1.13 - 1.33 mmol/L   CBC with Auto Differential   Result Value Ref Range    WBC 11.7 (H) 3.5 - 11.3 k/uL    RBC 3.74 (L) 3.95 - 5.11 m/uL    Hemoglobin 12.0 11.9 - 15.1 g/dL    Hematocrit 36.3 36.3 - 47.1 %    MCV 97.1 82.6 - 102.9 fL    MCH 32.1 25.2 - 33.5 pg    MCHC 33.1 28.4 - 34.8 g/dL    RDW 14.6 (H) 11.8 - 14.4 %    Platelets 012 335 - 616 k/uL    MPV 10.5 8.1 - 13.5 fL    NRBC Automated 0.0 0.0 per 100 WBC    RBC Morphology ANISOCYTOSIS PRESENT     Seg Neutrophils 85 (H) 36 - 65 %    Lymphocytes 6 (L) 24 - 43 %    Monocytes 9 3 - 12 %    Eosinophils % 0 (L) 1 - 4 %    Basophils 0 0 - 2 %    Immature Granulocytes 0 0 %    Segs Absolute 9.87 (H) 1.50 - 8.10 k/uL    Absolute Lymph # 0.70 (L) 1.10 - 3.70 k/uL    Absolute Mono # 1.00 0.10 - 1.20 k/uL    Absolute Eos # <0.03 0.00 - 0.44 k/uL    Basophils Absolute 0.03 0.00 - 0.20 k/uL    Absolute Immature Granulocyte 0.05 0.00 - 0.30 k/uL   Magnesium   Result Value Ref Range    Magnesium 2.0 1.6 - 2.6 mg/dL   Basic Metabolic Panel w/ Reflex to MG   Result Value Ref Range    Glucose 108 (H) 70 - 99 mg/dL    BUN 10 8 - 23 mg/dL    Creatinine 0.28 (L) 0.50 - 0.90 mg/dL    Calcium 8.7 8.6 - 10.4 mg/dL    Sodium 141 135 - 144 mmol/L    Potassium 3.1 (L) 3.7 - 5.3 mmol/L    Chloride 106 98 - 107 mmol/L    CO2 26 20 - 31 mmol/L    Anion Gap 9 9 - 17 mmol/L    GFR Non-African American >60 >60 mL/min    GFR African American >60 >60 mL/min    GFR Comment         CBC with Auto Differential   Result Value Ref Range    WBC 7.6 3.5 - 11.3 k/uL    RBC 3.32 (L) 3.95 - 5.11 m/uL    Hemoglobin 10.8 (L) 11.9 - 15.1 g/dL    Hematocrit 31.9 (L) 36.3 - 47.1 %    MCV 96.1 82.6 - 102.9 fL    MCH 32.5 25.2 - 33.5 pg    MCHC 33.9 28.4 - 34.8 g/dL    RDW 14.3 11.8 - 14.4 %    Platelets 801 527 - 639 k/uL    MPV 9.9 8.1 - 13.5 fL    NRBC Automated 0.0 0.0 per 100 WBC    Seg Neutrophils 73 (H) 36 - 65 %    Lymphocytes 13 (L) 24 - 43 %    Monocytes 13 (H) 3 - 12 %    Eosinophils % 1 1 - 4 %    Basophils 0 0 - 2 %    Immature Granulocytes 0 0 %    Segs Absolute 5.52 1.50 - 8.10 k/uL    Absolute Lymph # 0.95 (L) 1.10 - 3.70 k/uL    Absolute Mono # 0.95 0.10 - 1.20 k/uL    Absolute Eos # 0.10 0.00 - 0.44 k/uL    Basophils Absolute 0.03 0.00 - 0.20 k/uL    Absolute Immature Granulocyte <0.03 0.00 - 0.30 k/uL   Magnesium   Result Value Ref Range    Magnesium 2.0 1.6 - 2.6 mg/dL   Basic Metabolic Panel w/ Reflex to MG   Result Value Ref Range    Glucose 100 (H) 70 - 99 mg/dL    BUN 12 8 - 23 mg/dL    Creatinine 0.27 (L) 0.50 - 0.90 mg/dL    Calcium 9.4 8.6 - 10.4 mg/dL    Sodium 136 135 - 144 mmol/L    Potassium 3.6 (L) 3.7 - 5.3 mmol/L    Chloride 102 98 - 107 mmol/L    CO2 24 20 - 31 mmol/L    Anion Gap 10 9 - 17 mmol/L    GFR Non-African American >60 >60 mL/min    GFR African American >60 >60 mL/min    GFR Comment         CBC with Auto Differential   Result Value Ref Range    WBC 4.9 3.5 - 11.3 k/uL    RBC 3.42 (L) 3.95 - 5.11 m/uL    Hemoglobin 11.0 (L) 11.9 - 15.1 g/dL    Hematocrit 33.7 (L) 36.3 - 47.1 %    MCV 98.5 82.6 - 102.9 fL    MCH 32.2 25.2 - 33.5 pg    MCHC 32.6 28.4 - 34.8 g/dL    RDW 14.1 11.8 - 14.4 %    Platelets See Reflexed IPF Result 138 - 453 k/uL    NRBC Automated 0.0 0.0 per 100 WBC    Seg Neutrophils 62 36 - 65 %    Lymphocytes 21 (L) 24 - 43 % Monocytes 14 (H) 3 - 12 %    Eosinophils % 3 1 - 4 %    Basophils 1 0 - 2 %    Immature Granulocytes 0 0 %    Segs Absolute 3.02 1.50 - 8.10 k/uL    Absolute Lymph # 1.04 (L) 1.10 - 3.70 k/uL    Absolute Mono # 0.67 0.10 - 1.20 k/uL    Absolute Eos # 0.13 0.00 - 0.44 k/uL    Basophils Absolute 0.04 0.00 - 0.20 k/uL    Absolute Immature Granulocyte <0.03 0.00 - 0.30 k/uL   Magnesium   Result Value Ref Range    Magnesium 2.2 1.6 - 2.6 mg/dL   Immature Platelet Fraction   Result Value Ref Range    Platelet, Fluorescence Platelet clumps present, count appears adequate.  138 - 453 k/uL   Basic Metabolic Panel w/ Reflex to MG   Result Value Ref Range    Glucose 102 (H) 70 - 99 mg/dL    BUN 14 8 - 23 mg/dL    Creatinine 0.28 (L) 0.50 - 0.90 mg/dL    Calcium 9.6 8.6 - 10.4 mg/dL    Sodium 140 135 - 144 mmol/L    Potassium 4.0 3.7 - 5.3 mmol/L    Chloride 102 98 - 107 mmol/L    CO2 29 20 - 31 mmol/L    Anion Gap 9 9 - 17 mmol/L    GFR Non-African American >60 >60 mL/min    GFR African American >60 >60 mL/min    GFR Comment         CBC with Auto Differential   Result Value Ref Range    WBC 5.3 3.5 - 11.3 k/uL    RBC 3.56 (L) 3.95 - 5.11 m/uL    Hemoglobin 11.1 (L) 11.9 - 15.1 g/dL    Hematocrit 34.9 (L) 36.3 - 47.1 %    MCV 98.0 82.6 - 102.9 fL    MCH 31.2 25.2 - 33.5 pg    MCHC 31.8 28.4 - 34.8 g/dL    RDW 13.9 11.8 - 14.4 %    Platelets 867 698 - 510 k/uL    MPV 10.0 8.1 - 13.5 fL    NRBC Automated 0.0 0.0 per 100 WBC    Seg Neutrophils 58 36 - 65 %    Lymphocytes 21 (L) 24 - 43 %    Monocytes 17 (H) 3 - 12 %    Eosinophils % 3 1 - 4 %    Basophils 1 0 - 2 %    Immature Granulocytes 0 0 %    Segs Absolute 3.08 1.50 - 8.10 k/uL    Absolute Lymph # 1.11 1.10 - 3.70 k/uL    Absolute Mono # 0.87 0.10 - 1.20 k/uL    Absolute Eos # 0.15 0.00 - 0.44 k/uL    Basophils Absolute 0.03 0.00 - 0.20 k/uL    Absolute Immature Granulocyte <0.03 0.00 - 0.30 k/uL   Magnesium   Result Value Ref Range    Magnesium 2.3 1.6 - 2.6 mg/dL   POC Global Hemostasis TEG w/Lysis   Result Value Ref Range    Reaction Time TEG 6.9 4.6 - 9.1 min    LY30(Lysis) TEG 0.0 0.0 - 2.6 %    MA(Max Clot) Rapid TEG 63.5 52.0 - 70.0 mm    Fibrinogen, Functional TEG 21.3 15.0 - 32.0 mm    Performing Location       Performed at [facility]   POC Glucose Fingerstick   Result Value Ref Range    POC Glucose 107 (H) 65 - 105 mg/dL   POC Glucose Fingerstick   Result Value Ref Range    POC Glucose 100 65 - 105 mg/dL   Arterial Blood Gas, POC   Result Value Ref Range    POC pH 7.370 7.350 - 7.450    POC pCO2 44.5 35.0 - 48.0 mm Hg    POC PO2 599.4 (H) 83.0 - 108.0 mm Hg    POC HCO3 25.7 21.0 - 28.0 mmol/L    Positive Base Excess, Art 0 0.0 - 3.0    POC O2  (H) 94.0 - 98.0 %    O2 Device/Flow/% Adult Ventilator     Sample Site Arterial Line     Mode PRVC     FIO2 100.0    Lactic Acid, POC   Result Value Ref Range    POC Lactic Acid 1.17 0.56 - 1.39 mmol/L   POCT Glucose   Result Value Ref Range    POC Glucose 112 (H) 74 - 100 mg/dL   Arterial Blood Gas, POC   Result Value Ref Range    POC pH 7.403 7.350 - 7.450    POC pCO2 41.1 35.0 - 48.0 mm Hg    POC PO2 109.9 (H) 83.0 - 108.0 mm Hg    POC HCO3 25.6 21.0 - 28.0 mmol/L    Positive Base Excess, Art 1 0.0 - 3.0    POC O2 SAT 98 94.0 - 98.0 %    O2 Device/Flow/% Adult Ventilator     Sample Site Arterial Line     Mode PRVC     FIO2 30.0    Lactic Acid, POC   Result Value Ref Range    POC Lactic Acid 0.71 0.56 - 1.39 mmol/L   POCT Glucose   Result Value Ref Range    POC Glucose 125 (H) 74 - 100 mg/dL   POC Glucose Fingerstick   Result Value Ref Range    POC Glucose 123 (H) 65 - 105 mg/dL   POC Glucose Fingerstick   Result Value Ref Range    POC Glucose 110 (H) 65 - 105 mg/dL   POC Glucose Fingerstick   Result Value Ref Range    POC Glucose 110 (H) 65 - 105 mg/dL   POC Glucose Fingerstick   Result Value Ref Range    POC Glucose 130 (H) 65 - 105 mg/dL   POC Glucose Fingerstick   Result Value Ref Range    POC Glucose 129 (H) 65 - 105 mg/dL   Arterial Blood Gas, POC   Result Value Ref Range    POC pH 7.424 7.350 - 7.450    POC pCO2 35.2 35.0 - 48.0 mm Hg    POC PO2 75.9 (L) 83.0 - 108.0 mm Hg    POC HCO3 23.0 21.0 - 28.0 mmol/L    Negative Base Excess, Art 1 0.0 - 2.0    POC O2 SAT 96 94.0 - 98.0 %    O2 Device/Flow/% Adult Ventilator     Marty Test NOT APPLICABLE     Sample Site Arterial Line     FIO2 30.0    Lactic Acid, POC   Result Value Ref Range    POC Lactic Acid 0.62 0.56 - 1.39 mmol/L   POCT Glucose   Result Value Ref Range    POC Glucose 115 (H) 74 - 100 mg/dL   POC Glucose Fingerstick   Result Value Ref Range    POC Glucose 115 (H) 65 - 105 mg/dL   POC Glucose Fingerstick   Result Value Ref Range    POC Glucose 88 65 - 105 mg/dL   POC Glucose Fingerstick   Result Value Ref Range    POC Glucose 120 (H) 65 - 105 mg/dL   POC Glucose Fingerstick   Result Value Ref Range    POC Glucose 135 (H) 65 - 105 mg/dL   POC Glucose Fingerstick   Result Value Ref Range    POC Glucose 129 (H) 65 - 105 mg/dL   POC Glucose Fingerstick   Result Value Ref Range    POC Glucose 124 (H) 65 - 105 mg/dL   POC Glucose Fingerstick   Result Value Ref Range    POC Glucose 87 65 - 105 mg/dL   POC Glucose Fingerstick   Result Value Ref Range    POC Glucose 105 65 - 105 mg/dL   POC Glucose Fingerstick   Result Value Ref Range    POC Glucose 108 (H) 65 - 105 mg/dL   POC Glucose Fingerstick   Result Value Ref Range    POC Glucose 117 (H) 65 - 105 mg/dL   POC Glucose Fingerstick   Result Value Ref Range    POC Glucose 102 65 - 105 mg/dL   EKG 12 Lead   Result Value Ref Range    Ventricular Rate 65 BPM    Atrial Rate 65 BPM    P-R Interval 196 ms    QRS Duration 92 ms    Q-T Interval 438 ms    QTc Calculation (Bazett) 455 ms    P Axis 59 degrees    R Axis -13 degrees    T Axis 47 degrees   TYPE AND SCREEN   Result Value Ref Range    Expiration Date 08/25/2022,2359     Arm Band Number [id number]     ABO/Rh O POSITIVE Antibody Screen NEGATIVE        IMPRESSION: 68 yo F presents as a transfer from outside facility with known subdural hematoma and possible SAH. GCS 15. A&Ox4. Airway intact. Complaining of mild headache. Does have right sided periorbital bruising. Denying visual changes. Pupils 3mm and reactive bilaterally. Nares patent. No hemotympanum, no vergara sign. Trachea midline. Bilateral breath sounds. Remainder of examination per trauma surgery team, please refer to their progress note. RADIOLOGY:  CT HEAD WO CONTRAST    Result Date: 8/22/2022  EXAMINATION: CT OF THE HEAD WITHOUT CONTRAST  8/22/2022 2:02 pm TECHNIQUE: CT of the head was performed without the administration of intravenous contrast. Automated exposure control, iterative reconstruction, and/or weight based adjustment of the mA/kV was utilized to reduce the radiation dose to as low as reasonably achievable. COMPARISON: None. HISTORY: ORDERING SYSTEM PROVIDED HISTORY: fall TECHNOLOGIST PROVIDED HISTORY: fall Decision Support Exception - unselect if not a suspected or confirmed emergency medical condition->Emergency Medical Condition (MA) FINDINGS: BRAIN/VENTRICLES: Left holo-hemispheric subdural hematoma with a maximum depth of approximately 18 mm. Mass effect on the underlying cerebral parenchyma with effacement of the sulci. Mild shift of the midline from left to right approximately 5 mm. Suggestion of mild subarachnoid hemorrhage. The gray-white differentiation is otherwise maintained without evidence of an acute infarct. There is no evidence of hydrocephalus. Cerebellum and brainstem are within normal limits. ORBITS: The visualized portion of the orbits demonstrate no acute abnormality. SINUSES: The visualized paranasal sinuses and mastoid air cells demonstrate no acute abnormality. SOFT TISSUES/SKULL:  No acute abnormality of the visualized skull or soft tissues.      Left holo-hemispheric subdural hematoma with a maximum depth of approximately 18 mm. Suggestion of mild subarachnoid hemorrhage. Critical results were called by Dr. Bradford Barroso MD to Dr. Ketty Kamara on 8/22/2022 at 17:26. CT CERVICAL SPINE WO CONTRAST    Result Date: 8/22/2022  EXAMINATION: CT OF THE CERVICAL SPINE WITHOUT CONTRAST 8/22/2022 6:11 pm TECHNIQUE: CT of the cervical spine was performed without the administration of intravenous contrast. Multiplanar reformatted images are provided for review. Automated exposure control, iterative reconstruction, and/or weight based adjustment of the mA/kV was utilized to reduce the radiation dose to as low as reasonably achievable. COMPARISON: August 26, 2011 HISTORY: ORDERING SYSTEM PROVIDED HISTORY: trauma TECHNOLOGIST PROVIDED HISTORY: trauma Decision Support Exception - unselect if not a suspected or confirmed emergency medical condition->Emergency Medical Condition (MA) FINDINGS: BONES/ALIGNMENT: There is no acute fracture or traumatic malalignment. DEGENERATIVE CHANGES: Postoperative changes stable which include posterior fusing C3 through C6 as well as laminectomy at this level. Hardware also anteriorly fuses C6-7 C7-T1. Moderate multilevel degenerative change throughout. SOFT TISSUES: There is no prevertebral soft tissue swelling. No acute abnormality of the cervical spine. CT CHEST ABDOMEN PELVIS W CONTRAST    Result Date: 8/22/2022  EXAMINATION: CT OF THE CHEST, ABDOMEN, AND PELVIS WITH CONTRAST 8/22/2022 5:56 pm TECHNIQUE: CT of the chest, abdomen and pelvis was performed with the administration of intravenous contrast. Multiplanar reformatted images are provided for review. Automated exposure control, iterative reconstruction, and/or weight based adjustment of the mA/kV was utilized to reduce the radiation dose to as low as reasonably achievable.  COMPARISON: 04/28/2022 and 10/14/2021 HISTORY: ORDERING SYSTEM PROVIDED HISTORY: fall TECHNOLOGIST PROVIDED HISTORY: fall Decision Support Exception - unselect if not a suspected or confirmed emergency medical condition->Emergency Medical Condition (MA) FINDINGS: Chest: Mediastinum: Heart size is normal without pericardial effusion. Coronary artery atherosclerosis. Thoracic aorta and main pulmonary artery are normal in caliber. There are mildly enlarged mediastinal lymph nodes. Moderate hiatal hernia. Lungs/pleura: There is no pleural effusion. There is no pneumothorax. There are areas of consolidation and ground-glass in the upper lobes, similar to slightly improved from prior examination. The right lower lobe consolidation seen previously has near completely resolved. Soft Tissues/Bones: Cervicothoracic fusion is partially visualized. There is focal kyphosis in the lower thoracic spine. There is multilevel degenerative change. Spinal stimulator terminates at T8.  2.1 x 1.5 cm subcutaneous cyst in the right anterior chest wall, likely a sebaceous cyst. Abdomen/Pelvis: Organs: No acute abnormality within the liver, spleen, pancreas, or adrenal glands. Cholelithiasis without pericholecystic fluid. The common bile duct is dilated, measuring 1.3 cm. No nephrolithiasis or hydronephrosis. GI/Bowel: Moderate hiatal hernia. Stomach is partially distended. The small bowel is nondilated. Colon is nondilated. Pelvis: Bladder is partially distended without vesicular stone. Peritoneum/Retroperitoneum: No ascites or pneumoperitoneum. Atherosclerosis in the nondilated abdominal aorta. Mildly enlarged inguinal lymph nodes. Bones/Soft Tissues: Multilevel degenerative changes of the lumbar spine. Spinal stimulator is noted. 1. Interval improvement in the bilateral pulmonary consolidation from prior examination. There is some persistent consolidation and ground-glass in the upper lobes. 2. Mediastinal lymphadenopathy. 3. No acute intra-abdominal abnormality. 4. Cholelithiasis without cholecystitis.  5. Dilation of the common bile duct without obstructing stone or lesion identified, nonspecific. 6. Moderate hiatal hernia. CT LUMBAR SPINE TRAUMA RECONSTRUCTION    Result Date: 8/22/2022  EXAMINATION: CT OF THE THORACIC SPINE WITHOUT CONTRAST; CT OF THE LUMBAR SPINE WITHOUT CONTRAST 8/22/2022 TECHNIQUE: CT of the thoracic spine was performed without the administration of intravenous contrast. Multiplanar reformatted images are provided for review. Automated exposure control, iterative reconstruction, and/or weight based adjustment of the mA/kV was utilized to reduce the radiation dose to as low as reasonably achievable.; CT of the lumbar spine was performed without the administration of intravenous contrast. Multiplanar reformatted images are provided for review. Adjustment of mA and/or kV according to patient size was utilized. Automated exposure control, iterative reconstruction, and/or weight based adjustment of the mA/kV was utilized to reduce the radiation dose to as low as reasonably achievable. COMPARISON: None. HISTORY: ORDERING SYSTEM PROVIDED HISTORY: TRAUMA TECHNOLOGIST PROVIDED HISTORY: TRAUMA FINDINGS: BONES/ALIGNMENT: There is levocurvature of the lower thoracic spine. No significant listhesis. Vertebral body heights are maintained. No displaced fracture. Anterior fixation at the cervicothoracic junction extends to T1. Spinal stimulator terminates at T8. S shaped scoliosis in the lumbar spine. There is retrolisthesis at L1-2 and L2-3. Vertebral body heights are maintained. Prior intervertebral arthrodesis at L5-S1. DEGENERATIVE CHANGES: Multilevel degenerative changes in the thoracic and lumbar spines. SOFT TISSUES: No paraspinal mass is seen. 1.  No acute abnormality in the thoracic or lumbar spines. 2.  Multilevel degenerative change.      CT THORACIC SPINE TRAUMA RECONSTRUCTION    Result Date: 8/22/2022  EXAMINATION: CT OF THE THORACIC SPINE WITHOUT CONTRAST; CT OF THE LUMBAR SPINE WITHOUT CONTRAST 8/22/2022 TECHNIQUE: CT of the thoracic spine was performed without the administration of intravenous contrast. Multiplanar reformatted images are provided for review. Automated exposure control, iterative reconstruction, and/or weight based adjustment of the mA/kV was utilized to reduce the radiation dose to as low as reasonably achievable.; CT of the lumbar spine was performed without the administration of intravenous contrast. Multiplanar reformatted images are provided for review. Adjustment of mA and/or kV according to patient size was utilized. Automated exposure control, iterative reconstruction, and/or weight based adjustment of the mA/kV was utilized to reduce the radiation dose to as low as reasonably achievable. COMPARISON: None. HISTORY: ORDERING SYSTEM PROVIDED HISTORY: TRAUMA TECHNOLOGIST PROVIDED HISTORY: TRAUMA FINDINGS: BONES/ALIGNMENT: There is levocurvature of the lower thoracic spine. No significant listhesis. Vertebral body heights are maintained. No displaced fracture. Anterior fixation at the cervicothoracic junction extends to T1. Spinal stimulator terminates at T8. S shaped scoliosis in the lumbar spine. There is retrolisthesis at L1-2 and L2-3. Vertebral body heights are maintained. Prior intervertebral arthrodesis at L5-S1. DEGENERATIVE CHANGES: Multilevel degenerative changes in the thoracic and lumbar spines. SOFT TISSUES: No paraspinal mass is seen. 1.  No acute abnormality in the thoracic or lumbar spines. 2.  Multilevel degenerative change. CONSULTS:  IP CONSULT TO NEUROSURGERY  IP CONSULT TO DIETITIAN    FINAL IMPRESSION      1.  Subdural hematoma ([address]          DISPOSITION / PLAN     DISPOSITION Admitted 08/22/2022 08:20:39 PM      Anya Carson MD  Emergency Medicine Resident    (Please note that portions of thisnote were completed with a voice recognition program.  Efforts were made to edit the dictations but occasionally words are mis-transcribed.)       Anya Carson MD  Resident  09/06/22 [id number]

## 2022-09-09 ENCOUNTER — APPOINTMENT (OUTPATIENT)
Dept: GENERAL RADIOLOGY | Age: 77
End: 2022-09-09
Payer: MEDICARE

## 2022-09-09 ENCOUNTER — HOSPITAL ENCOUNTER (EMERGENCY)
Age: 77
Discharge: HOME | End: 2022-09-09
Attending: EMERGENCY MEDICINE
Payer: MEDICARE

## 2022-09-09 VITALS
HEART RATE: 88 BPM | RESPIRATION RATE: 24 BRPM | SYSTOLIC BLOOD PRESSURE: 102 MMHG | TEMPERATURE: 99 F | DIASTOLIC BLOOD PRESSURE: 64 MMHG | OXYGEN SATURATION: 90 %

## 2022-09-09 DIAGNOSIS — R93.89 ABNORMAL CHEST XRAY: Primary | ICD-10-CM

## 2022-09-09 LAB
ABSOLUTE EOS #: 0.22 K/UL (ref 0–0.44)
ABSOLUTE IMMATURE GRANULOCYTE: <0.03 K/UL (ref 0–0.3)
ABSOLUTE LYMPH #: 1.27 K/UL (ref 1.1–3.7)
ABSOLUTE MONO #: 0.63 K/UL (ref 0.1–1.2)
AMORPHOUS: ABNORMAL
ANION GAP SERPL CALCULATED.3IONS-SCNC: 9 MMOL/L (ref 9–17)
BACTERIA: ABNORMAL
BASOPHILS # BLD: 1 % (ref 0–2)
BASOPHILS ABSOLUTE: 0.04 K/UL (ref 0–0.2)
BILIRUBIN URINE: NEGATIVE
BUN BLDV-MCNC: 18 MG/DL (ref 8–23)
BUN/CREAT BLD: 38 (ref 9–20)
CALCIUM SERPL-MCNC: 10 MG/DL (ref 8.6–10.4)
CHLORIDE BLD-SCNC: 100 MMOL/L (ref 98–107)
CO2: 32 MMOL/L (ref 20–31)
COLOR: YELLOW
CREAT SERPL-MCNC: 0.47 MG/DL (ref 0.5–0.9)
EOSINOPHILS RELATIVE PERCENT: 3 % (ref 1–4)
EPITHELIAL CELLS UA: ABNORMAL /HPF (ref 0–25)
GFR AFRICAN AMERICAN: >60 ML/MIN
GFR NON-AFRICAN AMERICAN: >60 ML/MIN
GFR SERPL CREATININE-BSD FRML MDRD: ABNORMAL ML/MIN/{1.73_M2}
GFR SERPL CREATININE-BSD FRML MDRD: ABNORMAL ML/MIN/{1.73_M2}
GLUCOSE BLD-MCNC: 106 MG/DL (ref 70–99)
GLUCOSE URINE: NEGATIVE
HCT VFR BLD CALC: 36.7 % (ref 36.3–47.1)
HEMOGLOBIN: 11.7 G/DL (ref 11.9–15.1)
IMMATURE GRANULOCYTES: 0 %
KETONES, URINE: NEGATIVE
LACTIC ACID, SEPSIS: 1.6 MMOL/L (ref 0.5–1.9)
LEUKOCYTE ESTERASE, URINE: NEGATIVE
LYMPHOCYTES # BLD: 16 % (ref 24–43)
MCH RBC QN AUTO: 31.7 PG (ref 25.2–33.5)
MCHC RBC AUTO-ENTMCNC: 31.9 G/DL (ref 28.4–34.8)
MCV RBC AUTO: 99.5 FL (ref 82.6–102.9)
MONOCYTES # BLD: 8 % (ref 3–12)
NITRITE, URINE: NEGATIVE
NRBC AUTOMATED: 0 PER 100 WBC
PDW BLD-RTO: 13.4 % (ref 11.8–14.4)
PH UA: 7 (ref 5–9)
PLATELET # BLD: 448 K/UL (ref 138–453)
PMV BLD AUTO: 9.2 FL (ref 8.1–13.5)
POTASSIUM SERPL-SCNC: 4 MMOL/L (ref 3.7–5.3)
PROTEIN UA: NEGATIVE
RBC # BLD: 3.69 M/UL (ref 3.95–5.11)
RBC UA: ABNORMAL /HPF (ref 0–2)
SARS-COV-2, RAPID: NOT DETECTED
SEG NEUTROPHILS: 72 % (ref 36–65)
SEGMENTED NEUTROPHILS ABSOLUTE COUNT: 5.68 K/UL (ref 1.5–8.1)
SODIUM BLD-SCNC: 141 MMOL/L (ref 135–144)
SPECIFIC GRAVITY UA: 1.01 (ref 1.01–1.02)
SPECIMEN DESCRIPTION: NORMAL
TURBIDITY: CLEAR
URINE HGB: NEGATIVE
UROBILINOGEN, URINE: NORMAL
WBC # BLD: 7.9 K/UL (ref 3.5–11.3)
WBC UA: ABNORMAL /HPF (ref 0–5)

## 2022-09-09 PROCEDURE — 93005 ELECTROCARDIOGRAM TRACING: CPT | Performed by: EMERGENCY MEDICINE

## 2022-09-09 PROCEDURE — 36415 COLL VENOUS BLD VENIPUNCTURE: CPT

## 2022-09-09 PROCEDURE — 2580000003 HC RX 258: Performed by: EMERGENCY MEDICINE

## 2022-09-09 PROCEDURE — 71045 X-RAY EXAM CHEST 1 VIEW: CPT

## 2022-09-09 PROCEDURE — 96375 TX/PRO/DX INJ NEW DRUG ADDON: CPT

## 2022-09-09 PROCEDURE — 6360000002 HC RX W HCPCS: Performed by: EMERGENCY MEDICINE

## 2022-09-09 PROCEDURE — 83605 ASSAY OF LACTIC ACID: CPT

## 2022-09-09 PROCEDURE — 81001 URINALYSIS AUTO W/SCOPE: CPT

## 2022-09-09 PROCEDURE — 87635 SARS-COV-2 COVID-19 AMP PRB: CPT

## 2022-09-09 PROCEDURE — 96365 THER/PROPH/DIAG IV INF INIT: CPT

## 2022-09-09 PROCEDURE — 85025 COMPLETE CBC W/AUTO DIFF WBC: CPT

## 2022-09-09 PROCEDURE — 96367 TX/PROPH/DG ADDL SEQ IV INF: CPT

## 2022-09-09 PROCEDURE — 87040 BLOOD CULTURE FOR BACTERIA: CPT

## 2022-09-09 PROCEDURE — 99285 EMERGENCY DEPT VISIT HI MDM: CPT

## 2022-09-09 PROCEDURE — 80048 BASIC METABOLIC PNL TOTAL CA: CPT

## 2022-09-09 RX ORDER — MORPHINE SULFATE 2 MG/ML
2 INJECTION, SOLUTION INTRAMUSCULAR; INTRAVENOUS ONCE
Status: COMPLETED | OUTPATIENT
Start: 2022-09-09 | End: 2022-09-09

## 2022-09-09 RX ORDER — VANCOMYCIN HYDROCHLORIDE 500 MG/10ML
INJECTION, POWDER, LYOPHILIZED, FOR SOLUTION INTRAVENOUS
Status: DISCONTINUED
Start: 2022-09-09 | End: 2022-09-10 | Stop reason: HOSPADM

## 2022-09-09 RX ORDER — CIPROFLOXACIN 500 MG/1
500 TABLET, FILM COATED ORAL 2 TIMES DAILY
COMMUNITY
End: 2022-10-08

## 2022-09-09 RX ORDER — 0.9 % SODIUM CHLORIDE 0.9 %
30 INTRAVENOUS SOLUTION INTRAVENOUS ONCE
Status: COMPLETED | OUTPATIENT
Start: 2022-09-09 | End: 2022-09-09

## 2022-09-09 RX ORDER — SODIUM CHLORIDE 9 MG/ML
INJECTION, SOLUTION INTRAVENOUS PRN
Status: DISCONTINUED | OUTPATIENT
Start: 2022-09-09 | End: 2022-09-10 | Stop reason: HOSPADM

## 2022-09-09 RX ORDER — PREGABALIN 300 MG/1
300 CAPSULE ORAL 2 TIMES DAILY
Status: ON HOLD | COMMUNITY
End: 2022-10-18 | Stop reason: SDUPTHER

## 2022-09-09 RX ORDER — SODIUM CHLORIDE 0.9 % (FLUSH) 0.9 %
5-40 SYRINGE (ML) INJECTION EVERY 12 HOURS SCHEDULED
Status: DISCONTINUED | OUTPATIENT
Start: 2022-09-09 | End: 2022-09-10 | Stop reason: HOSPADM

## 2022-09-09 RX ORDER — ONDANSETRON 4 MG/1
4 TABLET, ORALLY DISINTEGRATING ORAL EVERY 8 HOURS PRN
COMMUNITY

## 2022-09-09 RX ORDER — SODIUM CHLORIDE 0.9 % (FLUSH) 0.9 %
5-40 SYRINGE (ML) INJECTION PRN
Status: DISCONTINUED | OUTPATIENT
Start: 2022-09-09 | End: 2022-09-10 | Stop reason: HOSPADM

## 2022-09-09 RX ORDER — ONDANSETRON 2 MG/ML
4 INJECTION INTRAMUSCULAR; INTRAVENOUS ONCE
Status: COMPLETED | OUTPATIENT
Start: 2022-09-09 | End: 2022-09-09

## 2022-09-09 RX ORDER — VANCOMYCIN HYDROCHLORIDE 1 G/20ML
INJECTION, POWDER, LYOPHILIZED, FOR SOLUTION INTRAVENOUS
Status: DISCONTINUED
Start: 2022-09-09 | End: 2022-09-10 | Stop reason: HOSPADM

## 2022-09-09 RX ADMIN — SODIUM CHLORIDE 2000 ML: 9 INJECTION, SOLUTION INTRAVENOUS at 19:51

## 2022-09-09 RX ADMIN — MORPHINE SULFATE 2 MG: 2 INJECTION, SOLUTION INTRAMUSCULAR; INTRAVENOUS at 20:22

## 2022-09-09 RX ADMIN — ONDANSETRON 4 MG: 2 INJECTION INTRAMUSCULAR; INTRAVENOUS at 19:53

## 2022-09-09 RX ADMIN — PIPERACILLIN AND TAZOBACTAM 4500 MG: 4; .5 INJECTION, POWDER, FOR SOLUTION INTRAVENOUS at 19:55

## 2022-09-09 RX ADMIN — VANCOMYCIN HYDROCHLORIDE 1500 MG: 1 INJECTION, POWDER, LYOPHILIZED, FOR SOLUTION INTRAVENOUS at 20:51

## 2022-09-09 RX ADMIN — SODIUM CHLORIDE, PRESERVATIVE FREE 10 ML: 5 INJECTION INTRAVENOUS at 19:55

## 2022-09-09 ASSESSMENT — PAIN SCALES - GENERAL: PAINLEVEL_OUTOF10: 3

## 2022-09-09 ASSESSMENT — PAIN DESCRIPTION - LOCATION: LOCATION: HEAD

## 2022-09-09 ASSESSMENT — PAIN DESCRIPTION - DESCRIPTORS: DESCRIPTORS: ACHING

## 2022-09-09 ASSESSMENT — PAIN - FUNCTIONAL ASSESSMENT: PAIN_FUNCTIONAL_ASSESSMENT: 0-10

## 2022-09-09 ASSESSMENT — PAIN DESCRIPTION - FREQUENCY: FREQUENCY: CONTINUOUS

## 2022-09-10 LAB
EKG ATRIAL RATE: 84 BPM
EKG P AXIS: 49 DEGREES
EKG P-R INTERVAL: 178 MS
EKG Q-T INTERVAL: 390 MS
EKG QRS DURATION: 84 MS
EKG QTC CALCULATION (BAZETT): 460 MS
EKG R AXIS: -25 DEGREES
EKG T AXIS: 69 DEGREES
EKG VENTRICULAR RATE: 84 BPM

## 2022-09-10 PROCEDURE — 93010 ELECTROCARDIOGRAM REPORT: CPT | Performed by: INTERNAL MEDICINE

## 2022-09-10 NOTE — DISCHARGE INSTRUCTIONS
Please resume your oxygen therapy at home. However, do not have your O2 oxygen level above 94% due to your history of COPD. Please follow-up with your primary care physician at your earliest convenience. Symptoms worsen other concerns please return to emergency department.

## 2022-09-10 NOTE — ED PROVIDER NOTES
cord stimulator removed    BACK SURGERY  10/23/2003    new infusion pump placed    BACK SURGERY  09/11/2017    replaced infusion pump    CARPAL TUNNEL RELEASE Right 12/17/1999    CARPAL TUNNEL RELEASE Left 11/24/1999    CARPAL TUNNEL RELEASE Right 12/30/2002    CARPAL TUNNEL RELEASE Left 12/17/2003    CERVICAL [place] SURGERY  10/25/2004    anterior cervical diskectomy C7-T1    CERVICAL DISC SURGERY  12/22/2004    anterior cervical discectomy L0-5 (complicated by damaged nerve to vocal cord)    CRANIOTOMY Left 8/23/2022    LEFT CRANIOTOMY FOR SUBDURAL HEMATOMA EVACUATION performed by Mike Hill DO at [address] [place] [address] Left 12/20/2018    ORIF wrist    HUMERUS FRACTURE SURGERY Right 10/03/2010    HYSTERECTOMY (CERVIX STATUS UNKNOWN)  08/20/1991    KNEE ARTHROSCOPY Right 06/02/2011    KNEE SURGERY Right 02/04/2016    repair distal portion of knee arthroplasty due to prosthesis loosening    LUMBAR [place] SURGERY  02/15/1994    due to scar tissue from previous surgery    LUMBAR DISCECTOMY  08/30/1993    L5-S1    LUMBAR LAMINECTOMY  06/09/2008    L3-4-5    NECK SURGERY  05/03/2002    posterior plate fusion    NECK SURGERY  12/09/2005    reconnect nerve to vocal cord [facility]    TOE AMPUTATION  10/08/2006    left 3rd toe - osteomyelitis    TOE AMPUTATION  03/07/2008    left 4th toe partial amputation    TOE AMPUTATION  10/03/2008    left 2nd toe    TOTAL KNEE ARTHROPLASTY Right 03/19/2021    WRIST FRACTURE SURGERY Left 12/20/2018    WRIST OPEN REDUCTION INTERNAL FIXATION-DISTAL RADIUS performed by Wu Noriega MD at [facility] Left 12/20/2018    WRIST SURGERY Left 07/02/2019    left wrist ulnar shortening osteotomy         CURRENT MEDICATIONS       Discharge Medication List as of 9/9/2022  9:43 PM        CONTINUE these medications which have NOT CHANGED    Details   pregabalin (LYRICA) 300 MG capsule Take 300 mg by mouth 2 times daily. Historical Med      ondansetron (ZOFRAN-ODT) 4 MG disintegrating tablet Take 4 mg by mouth every 8 hours as needed for Nausea or VomitingHistorical Med      ciprofloxacin (CIPRO) 500 MG tablet Take 500 mg by mouth 2 times dailyHistorical Med      amLODIPine (NORVASC) 10 MG tablet Take 1 tablet by mouth daily, Disp-30 tablet, R-3DC to [facility]      benzocaine-menthol (CEPACOL SORE THROAT) 15-3.6 MG lozenge Take 1 lozenge by mouth every 2 hours as needed for Sore Throat, Disp-168 lozengeDC to [facility]      enoxaparin Sodium (LOVENOX) 30 MG/0.3ML injection Inject 0.3 mLs into the skin 2 times daily for 14 days, Disp-8.4 mL, R-0DC to [facility]      levETIRAcetam (KEPPRA) 100 MG/ML solution Take 10 mLs by mouth 2 times daily, R-3DC to [facility]      ibuprofen (ADVIL;MOTRIN) 400 MG tablet Take 1 tablet by mouth every 6 hours as needed for Pain, Disp-120 tablet, R-3OTC      DULoxetine (CYMBALTA) 60 MG extended release capsule Take 60 mg by mouth dailyHistorical Med      traZODone (DESYREL) 100 MG tablet Take 200 mg by mouth nightlyHistorical Med      hydroxychloroquine (PLAQUENIL) 200 MG tablet Take 300 mg by mouth dailyHistorical Med      calcium citrate-vitamin D (CITRICAL + D) 315-250 MG-UNIT TABS per tablet Take 1 tablet by mouth 2 times daily (with meals)Historical Med      potassium chloride (KLOR-CON) 20 MEQ packet Take 20 mEq by mouth 2 times dailyHistorical Med      alendronate (FOSAMAX) 70 MG tablet Take 1 tablet by mouth every 7 days On Sundays, Disp-12 tablet, R-3Normal      pantoprazole sodium (PROTONIX) 40 MG PACK packet Take 1 packet by mouth in the morning and 1 packet in the evening. Take before meals. , Disp-180 each, R-3Normal      sertraline (ZOLOFT) 50 MG tablet Take 1 tablet by mouth daily, Disp-30 tablet, R-5Normal      levothyroxine (SYNTHROID) 200 MCG tablet Take 1 tablet by mouth Daily, Disp-90 tablet, R-3Normal      ipratropium-albuterol (DUONEB) 0.5-2.5 (3) MG/3ML SOLN nebulizer solution 3 mLsHistorical Med      umeclidinium-vilanterol [facility] ELLIPTA) 62.5-25 MCG/INH AEPB inhaler Inhale 1 puff into the lungs daily, Disp-3 each, R-3Normal      albuterol sulfate  (90 Base) MCG/ACT inhaler Inhale 2 puffs into the lungs 4 times daily, Disp-3 each, R-3Normal      furosemide (LASIX) 20 MG tablet Take 1 tablet by mouth daily, Disp-90 tablet, R-3Normal             ALLERGIES     Patient has no known allergies. FAMILY HISTORY       Family History   Problem Relation Age of Onset    Heart Attack Father 61    Heart Attack Sister     Heart Disease Brother           SOCIAL HISTORY       Social History     Socioeconomic History    Marital status:    Tobacco Use    Smoking status: Former     Packs/day: 1.00     Years: 10.00     Pack years: 10.00     Types: Cigarettes     Quit date: 1976     Years since quittin.8    Smokeless tobacco: Never   Vaping Use    Vaping Use: Never used   Substance and Sexual Activity    Alcohol use: No    Drug use: No     Social Determinants of Health     Financial Resource Strain: Low Risk     Difficulty of Paying Living Expenses: Not hard at all   Food Insecurity: No Food Insecurity    Worried About Running Out of Food in the Last Year: Never true    Ran Out of Food in the Last Year: Never true   Physical Activity: Inactive    Days of Exercise per Week: 0 days    Minutes of Exercise per Session: 0 min       SCREENINGS         [place] Coma Scale  Eye Opening: Spontaneous  Best Verbal Response: Oriented  Best Motor Response: Obeys commands  Kristy Coma Scale Score: 15                     CIWA Assessment  BP: 102/64  Heart Rate: 88                 PHYSICAL EXAM    (up to 7 for level 4, 8 or more for level 5)     ED Triage Vitals [22 191]   BP Temp Temp Source Heart Rate Resp SpO2 Height Weight   (!) 179/65 99 °F (37.2 °C) Tympanic 88 24 (!) 88 % -- --       Physical Exam  Constitutional:       General: She is not in acute distress. Appearance: She is well-developed. She is not diaphoretic.    HENT:      Head: Normocephalic and atraumatic. Eyes:      General:         Right eye: No discharge. Left eye: No discharge. Neck:      Trachea: No tracheal deviation. Cardiovascular:      Rate and Rhythm: Normal rate and regular rhythm. Heart sounds: No murmur heard. No friction rub. Pulmonary:      Effort: Pulmonary effort is normal. No respiratory distress. Breath sounds: No stridor. No wheezing or rales. Chest:      Chest wall: No tenderness. Abdominal:      General: There is no distension. Palpations: Abdomen is soft. There is no mass. Tenderness: There is no abdominal tenderness. There is no guarding or rebound. Musculoskeletal:         General: No tenderness or deformity. Normal range of motion. Cervical back: Normal range of motion. Skin:     General: Skin is warm. Findings: No erythema or rash. Neurological:      Mental Status: She is alert and oriented to person, place, and time. Psychiatric:         Behavior: Behavior normal.       DIAGNOSTIC RESULTS     EKG: All EKG's are interpreted by the Emergency Department Physician who either signs or Co-signs this chart in the absence of a cardiologist.        RADIOLOGY:   Non-plain film images such as CT, Ultrasound and MRI are read by the radiologist. Plain radiographic images are visualized and preliminarily interpreted by the emergency physician with the below findings:        Interpretation per the Radiologist below, if available at the time of this note:    XR CHEST PORTABLE   Final Result   1. Enteric tube courses below the diaphragm with the tip and side-port not   visualized. 2. Stable bilateral reticular opacities.                ED BEDSIDE ULTRASOUND:   Performed by ED Physician - none    LABS:  Labs Reviewed   CBC WITH AUTO DIFFERENTIAL - Abnormal; Notable for the following components:       Result Value    RBC 3.69 (*)     Hemoglobin 11.7 (*)     Seg Neutrophils 72 (*)     Lymphocytes 16 (*)     All other components within normal limits   URINALYSIS WITH MICROSCOPIC - Abnormal; Notable for the following components:    Bacteria, UA 1+ (*)     Amorphous, UA 2+ (*)     All other components within normal limits   BASIC METABOLIC PANEL - Abnormal; Notable for the following components:    Glucose 106 (*)     Creatinine 0.47 (*)     Bun/Cre Ratio 38 (*)     CO2 32 (*)     All other components within normal limits   COVID-19, RAPID   CULTURE, BLOOD 1   CULTURE, BLOOD 2   LACTATE, SEPSIS   LACTATE, SEPSIS       All other labs were within normal range or not returned as of this dictation. EMERGENCY DEPARTMENT COURSE and DIFFERENTIAL DIAGNOSIS/MDM:   Vitals:    Vitals:    09/09/22 2028 09/09/22 2030 09/09/22 2100 09/09/22 2115   BP:  106/68 102/64    Pulse:       Resp:       Temp:       TempSrc:       SpO2: 93%   90%           MDM  Number of Diagnoses or Management Options  Diagnosis management comments: Pleasant 78-year-old female present with concern for possible pneumonia per imaging conducted. Patient was afebrile nontoxic-appearing chest x-ray demonstrates stable findings from previous x-rays and patient was not coughing did not have any leukocytosis therefore I did not have any suspicion for any acute pneumonia or acute pulmonary pathology. Lactic acid was also negative. Therefore patient was discharged. Patient was requiring oxygen but she was previously requiring oxygen, due to her COPD, and I was not concerned for any acute pathology and recommended patient continue her oxygen therapy and follow-up. Patient and family are comfortable with plan and time discharge patient was maintaining her airway and otherwise acutely clinically stable. REASSESSMENT          CRITICAL CARE TIME   Total Critical Care time was  minutes, excluding separately reportable procedures.   There was a high probability of clinically significant/life threatening deterioration in the patient's condition which required my urgent intervention. CONSULTS:  None    PROCEDURES:  Unless otherwise noted below, none     Procedures      FINAL IMPRESSION      1. Abnormal chest xray          DISPOSITION/PLAN   DISPOSITION Decision To Discharge 09/09/2022 09:42:48 PM      PATIENT REFERRED TO:  Meryl Lee MD  [address] [facility] [address]  [phone]          DISCHARGE MEDICATIONS:  Discharge Medication List as of 9/9/2022  9:43 PM        Controlled Substances Monitoring:     RX Monitoring 12/17/2018   Attestation The Prescription Monitoring Report for this patient was reviewed today. Periodic Controlled Substance Monitoring No signs of potential drug abuse or diversion identified.        (Please note that portions of this note were completed with a voice recognition program.  Efforts were made to edit the dictations but occasionally words are mis-transcribed.)    Skylar Herring MD (electronically signed)  Attending Emergency Physician            Skylar Herring MD  09/10/22 [id number]

## 2022-09-14 ENCOUNTER — OFFICE VISIT (OUTPATIENT)
Dept: NEUROSURGERY | Age: 77
End: 2022-09-14

## 2022-09-14 VITALS
DIASTOLIC BLOOD PRESSURE: 71 MMHG | TEMPERATURE: 98.4 F | OXYGEN SATURATION: 99 % | BODY MASS INDEX: 23.82 KG/M2 | HEIGHT: 65 IN | HEART RATE: 80 BPM | WEIGHT: 143 LBS | SYSTOLIC BLOOD PRESSURE: 123 MMHG

## 2022-09-14 DIAGNOSIS — S06.5XAA SUBDURAL HEMATOMA: Primary | ICD-10-CM

## 2022-09-14 DIAGNOSIS — Z98.890 S/P CRANIOTOMY: ICD-10-CM

## 2022-09-14 PROCEDURE — 99024 POSTOP FOLLOW-UP VISIT: CPT | Performed by: NURSE PRACTITIONER

## 2022-09-14 NOTE — PROGRESS NOTES
[address]  [facility] [address] [facility] [address]  Dept: [phone]    Patient:  Ale Ortiz  YOB: 1945  Date: 9/14/22    The patient is a 69 y.o. female who presents today for consult of the following problems:     Chief Complaint   Patient presents with    Post-Op Check     2 week post op LEFT CRANIOTOMY FOR SUBDURAL HEMATOMA EVACUATION         HPI:     Ale Ortiz is a 69 y.o. female who presents to the office for post-op evaluation s/p sided craniotomy for evacuation of subdural hematoma. Patient currently in rehab facility in [facility]. States that she has been doing well. Regaining strength. Working with physical, occupational as well as speech therapies. Has been able to ambulate several times daily with the use of a walker. Was even able to navigate some stairs yesterday. Does feel just some generalized fatigue. Incision has been healing well, denies any discharge, drainage, fevers or chills. Does have staples to be removed today. Denies any seizures or seizure-like activity. Was due to have a repeat CT scan prior to this appointment prior to resuming Xarelto, but has not yet completed this. Strength 5/5  Sensation intact  Incision well-healed  PERRL  Face symmetric    Date of surgery: 8/23/2022    Assessment and Plan:      1. Subdural hematoma ([address]    2. S/P craniotomy          Plan: Patient doing well postoperatively. Incision has healed well. Intact staples removed without difficulty. Continue working with physical, occupational and speech therapies. Order again provided for repeat CT head, instructions for facility to complete this week if able. Resume Xarelto pending review of repeat CT. Patient to return in 4 to 5 weeks for next postop visit with Dr. Carri Corado.     Followup: Return in about 4 weeks (around 10/12/2022), or if symptoms worsen or fail to improve. Prescriptions Ordered:  No orders of the defined types were placed in this encounter. Orders Placed:  Orders Placed This Encounter   Procedures    CT HEAD WO CONTRAST     Standing Status:   Future     Standing Expiration Date:   9/14/2023     Order Specific Question:   Reason for exam:     Answer:   follow up SDH; s/p craniotomy        Electronically signed by RYAN Dela Cruz CNP on 9/14/2022 at 12:04 PM    Please note that this chart was generated using voice recognition Dragon dictation software. Although every effort was made to ensure the accuracy of this automated transcription, some errors in transcription may have occurred.

## 2022-09-27 ENCOUNTER — HOSPITAL ENCOUNTER (OUTPATIENT)
Dept: CT IMAGING | Age: 77
Discharge: HOME | End: 2022-09-29
Payer: MEDICARE

## 2022-09-27 DIAGNOSIS — S06.5XAA SUBDURAL HEMATOMA: ICD-10-CM

## 2022-09-27 DIAGNOSIS — Z98.890 S/P CRANIOTOMY: ICD-10-CM

## 2022-09-27 PROCEDURE — 70450 CT HEAD/BRAIN W/O DYE: CPT

## 2022-10-03 ENCOUNTER — CARE COORDINATION (OUTPATIENT)
Dept: CASE MANAGEMENT | Age: 77
End: 2022-10-03

## 2022-10-03 ENCOUNTER — CARE COORDINATION (OUTPATIENT)
Dept: CARE COORDINATION | Age: 77
End: 2022-10-03

## 2022-10-03 NOTE — CARE COORDINATION
I spoke with Colletta Browns at [facility] and confirmed [facility] is scheduled for tomorrow. Will notify CTN at this time. I spoke with patient and notified her of [facility] date with [facility]. Patient voiced understanding.

## 2022-10-03 NOTE — CARE COORDINATION
[facility] Update Call    10/3/2022    Patient: Rocael Rahman Patient : 1945   MRN: [id number]  Reason for Admission: Subdural Hematoma  Discharge Date: 22 RARS: Readmission Risk Score: 21.4    . Acute Care Course: Patient admitted to [facility] after a fall resulting in a subdural hematoma. S/P craniotomy -. He discharged to [facility] -. HFU made: Surgeon 10/26/22      Sig Hx: Hypothyroidism, Major depressive disorder, Chronic midline low back pain without sciatica,     DME: Walker    Conversation: Patient states she feels good. She is glad to be home. She is sleeping well. Denies sob, headaches, dizziness, vision changes or elimination concerns. Last BM last night. Patient states she has chronic lower back and leg pain. She has Norco available but takes Tylenol or Ibuprofen instead. State it does not alleviate pain. It takes the edge off. BLE swelling. Patient takes Lasix 20 mg daily as needed. States she took medication and is elevating. Does not wear compression socks. States they make her legs slightly swell above socks. Appetite is good. Medications reviewed. Patient states they elected [facility]. Agency name unknown and no contact made. Patient ambulates and performs ADL's independently. Discussed surgeon f/u visit 10/26. Patient declined assistance to schedule a PCP f/u. States she will call today. Request to SS to call [facility] for [facility] set up information. Follow up plan: Will resolve episode after confirmation of HHC. Patient has good family support. Transitions of Care Initial Call    Was this an external facility discharge?  Yes, 22  Discharge Facility: [facility] at [address] [place] [address] to be reviewed by the provider   Additional needs identified to be addressed with provider: No  none             Method of communication with provider : none    Advance Care Planning:   Does patient have an Advance Directive: not on file. Care Transition Nurse contacted the patient by telephone to perform post hospital discharge assessment. Verified name and  with patient as identifiers. Provided introduction to self, and explanation of the CTN role. CTN reviewed discharge instructions, medical action plan and red flags with patient who verbalized understanding. Patient given an opportunity to ask questions and does not have any further questions or concerns at this time. Were discharge instructions available to patient? Yes. Reviewed appropriate site of care based on symptoms and resources available to patient including: PCP  Specialist  Home health  When to call 911. The patient agrees to contact the PCP office for questions related to their healthcare. Medication reconciliation was performed with patient, who verbalizes understanding of administration of home medications. Advised obtaining a 90-day supply of all daily and as-needed medications. Was patient discharged with a pulse oximeter? no    CTN provided contact information. No further follow-up call indicated based on severity of symptoms and risk factors. Plan for next call:  Patient doing well.  She has family support         Care Transitions Post Acute Facility Update    Care Transitions Interventions  Post Acute Facility: [facility] at [address] [place] [address]

## 2022-10-08 ENCOUNTER — HOSPITAL ENCOUNTER (INPATIENT)
Age: 77
LOS: 10 days | Discharge: NURSING, REHAB OR LONG-TERM CARE | DRG: 871 | End: 2022-10-18
Attending: EMERGENCY MEDICINE | Admitting: INTERNAL MEDICINE
Payer: MEDICARE

## 2022-10-08 ENCOUNTER — APPOINTMENT (OUTPATIENT)
Dept: CT IMAGING | Age: 77
DRG: 871 | End: 2022-10-08
Payer: MEDICARE

## 2022-10-08 ENCOUNTER — APPOINTMENT (OUTPATIENT)
Dept: GENERAL RADIOLOGY | Age: 77
DRG: 871 | End: 2022-10-08
Payer: MEDICARE

## 2022-10-08 DIAGNOSIS — J18.9 PNEUMONIA DUE TO INFECTIOUS ORGANISM, UNSPECIFIED LATERALITY, UNSPECIFIED PART OF LUNG: Primary | ICD-10-CM

## 2022-10-08 DIAGNOSIS — S06.5XAA SUBDURAL HEMATOMA: ICD-10-CM

## 2022-10-08 DIAGNOSIS — R41.0 CONFUSION: ICD-10-CM

## 2022-10-08 DIAGNOSIS — E87.29 CO2 RETENTION: ICD-10-CM

## 2022-10-08 PROBLEM — A41.9 SEPSIS DUE TO PNEUMONIA (HCC): Status: ACTIVE | Noted: 2022-10-08

## 2022-10-08 LAB
ABSOLUTE EOS #: 0.09 K/UL (ref 0–0.44)
ABSOLUTE IMMATURE GRANULOCYTE: <0.03 K/UL (ref 0–0.3)
ABSOLUTE LYMPH #: 0.56 K/UL (ref 1.1–3.7)
ABSOLUTE MONO #: 0.96 K/UL (ref 0.1–1.2)
ADENOVIRUS PCR: NOT DETECTED
ALBUMIN SERPL-MCNC: 3.9 G/DL (ref 3.5–5.2)
ALBUMIN/GLOBULIN RATIO: 1.4 (ref 1–2.5)
ALP BLD-CCNC: 98 U/L (ref 35–104)
ALT SERPL-CCNC: 11 U/L (ref 5–33)
ANION GAP SERPL CALCULATED.3IONS-SCNC: 6 MMOL/L (ref 9–17)
AST SERPL-CCNC: 18 U/L
BASOPHILS # BLD: 0 % (ref 0–2)
BASOPHILS ABSOLUTE: 0.03 K/UL (ref 0–0.2)
BILIRUB SERPL-MCNC: 0.3 MG/DL (ref 0.3–1.2)
BILIRUBIN URINE: NEGATIVE
BORDETELLA PARAPERTUSSIS: NOT DETECTED
BORDETELLA PERTUSSIS PCR: NOT DETECTED
BUN BLDV-MCNC: 13 MG/DL (ref 8–23)
BUN/CREAT BLD: 24 (ref 9–20)
CALCIUM SERPL-MCNC: 9.4 MG/DL (ref 8.6–10.4)
CHLAMYDIA PNEUMONIAE BY PCR: NOT DETECTED
CHLORIDE BLD-SCNC: 98 MMOL/L (ref 98–107)
CO2: 35 MMOL/L (ref 20–31)
COLOR: YELLOW
CORONAVIRUS 229E PCR: NOT DETECTED
CORONAVIRUS HKU1 PCR: NOT DETECTED
CORONAVIRUS NL63 PCR: NOT DETECTED
CORONAVIRUS OC43 PCR: NOT DETECTED
CREAT SERPL-MCNC: 0.55 MG/DL (ref 0.5–0.9)
EKG ATRIAL RATE: 111 BPM
EKG P AXIS: 61 DEGREES
EKG P-R INTERVAL: 202 MS
EKG Q-T INTERVAL: 310 MS
EKG QRS DURATION: 80 MS
EKG QTC CALCULATION (BAZETT): 421 MS
EKG R AXIS: -21 DEGREES
EKG T AXIS: 48 DEGREES
EKG VENTRICULAR RATE: 111 BPM
EOSINOPHILS RELATIVE PERCENT: 1 % (ref 1–4)
FIO2: 32
GFR SERPL CREATININE-BSD FRML MDRD: >60 ML/MIN/1.73M2
GLUCOSE BLD-MCNC: 121 MG/DL (ref 70–99)
GLUCOSE URINE: NEGATIVE
HCO3 VENOUS: 33.7 MMOL/L (ref 24–30)
HCT VFR BLD CALC: 37.2 % (ref 36.3–47.1)
HEMOGLOBIN: 12 G/DL (ref 11.9–15.1)
HUMAN METAPNEUMOVIRUS PCR: NOT DETECTED
IMMATURE GRANULOCYTES: 0 %
INFLUENZA A BY PCR: NOT DETECTED
INFLUENZA B BY PCR: NOT DETECTED
INR BLD: 1
KETONES, URINE: NEGATIVE
LACTIC ACID, SEPSIS: 1.2 MMOL/L (ref 0.5–1.9)
LACTIC ACID, SEPSIS: 1.2 MMOL/L (ref 0.5–1.9)
LACTIC ACID, SEPSIS: 1.3 MMOL/L (ref 0.5–1.9)
LACTIC ACID, SEPSIS: 1.6 MMOL/L (ref 0.5–1.9)
LEUKOCYTE ESTERASE, URINE: NEGATIVE
LIPASE: 17 U/L (ref 13–60)
LYMPHOCYTES # BLD: 5 % (ref 24–43)
MCH RBC QN AUTO: 30.5 PG (ref 25.2–33.5)
MCHC RBC AUTO-ENTMCNC: 32.3 G/DL (ref 28.4–34.8)
MCV RBC AUTO: 94.7 FL (ref 82.6–102.9)
MONOCYTES # BLD: 8 % (ref 3–12)
MYCOPLASMA PNEUMONIAE PCR: NOT DETECTED
NITRITE, URINE: NEGATIVE
NRBC AUTOMATED: 0 PER 100 WBC
O2 DEVICE/FLOW/%: ABNORMAL
O2 SAT, VEN: 75.1 % (ref 60–85)
PARAINFLUENZA 1 PCR: NOT DETECTED
PARAINFLUENZA 2 PCR: NOT DETECTED
PARAINFLUENZA 3 PCR: NOT DETECTED
PARAINFLUENZA 4 PCR: NOT DETECTED
PARTIAL THROMBOPLASTIN TIME: 28.7 SEC (ref 26.8–34.8)
PATIENT TEMP: 37
PCO2, VEN: 60.7 MM HG (ref 39–55)
PDW BLD-RTO: 13.2 % (ref 11.8–14.4)
PH UA: 6 (ref 5–9)
PH VENOUS: 7.36 (ref 7.32–7.42)
PLATELET # BLD: 222 K/UL (ref 138–453)
PMV BLD AUTO: 9.7 FL (ref 8.1–13.5)
PO2, VEN: 42.5 MM HG (ref 30–50)
POSITIVE BASE EXCESS, VEN: 6.1 MMOL/L (ref 0–2)
POTASSIUM SERPL-SCNC: 3.4 MMOL/L (ref 3.7–5.3)
PRO-BNP: 117 PG/ML
PROTEIN UA: NEGATIVE
PROTHROMBIN TIME: 13 SEC (ref 11.5–14.2)
PT. POSITION: ABNORMAL
RBC # BLD: 3.93 M/UL (ref 3.95–5.11)
RESP SYNCYTIAL VIRUS PCR: NOT DETECTED
RESPIRATORY RATE: 19
RHINO/ENTEROVIRUS PCR: NOT DETECTED
SARS-COV-2, PCR: NOT DETECTED
SARS-COV-2, RAPID: NOT DETECTED
SEG NEUTROPHILS: 86 % (ref 36–65)
SEGMENTED NEUTROPHILS ABSOLUTE COUNT: 10 K/UL (ref 1.5–8.1)
SODIUM BLD-SCNC: 139 MMOL/L (ref 135–144)
SPECIFIC GRAVITY UA: 1.01 (ref 1.01–1.02)
SPECIMEN DESCRIPTION: NORMAL
SPECIMEN DESCRIPTION: NORMAL
TOTAL PROTEIN: 6.6 G/DL (ref 6.4–8.3)
TROPONIN, HIGH SENSITIVITY: 12 NG/L (ref 0–14)
TURBIDITY: CLEAR
URINE HGB: NEGATIVE
UROBILINOGEN, URINE: NORMAL
WBC # BLD: 11.7 K/UL (ref 3.5–11.3)

## 2022-10-08 PROCEDURE — 2580000003 HC RX 258: Performed by: INTERNAL MEDICINE

## 2022-10-08 PROCEDURE — 2000000000 HC ICU R&B

## 2022-10-08 PROCEDURE — 93010 ELECTROCARDIOGRAM REPORT: CPT | Performed by: INTERNAL MEDICINE

## 2022-10-08 PROCEDURE — 70450 CT HEAD/BRAIN W/O DYE: CPT

## 2022-10-08 PROCEDURE — 6360000002 HC RX W HCPCS: Performed by: EMERGENCY MEDICINE

## 2022-10-08 PROCEDURE — 2700000000 HC OXYGEN THERAPY PER DAY

## 2022-10-08 PROCEDURE — 87040 BLOOD CULTURE FOR BACTERIA: CPT

## 2022-10-08 PROCEDURE — 84484 ASSAY OF TROPONIN QUANT: CPT

## 2022-10-08 PROCEDURE — 6370000000 HC RX 637 (ALT 250 FOR IP): Performed by: EMERGENCY MEDICINE

## 2022-10-08 PROCEDURE — 94640 AIRWAY INHALATION TREATMENT: CPT

## 2022-10-08 PROCEDURE — 6360000002 HC RX W HCPCS: Performed by: INTERNAL MEDICINE

## 2022-10-08 PROCEDURE — 83690 ASSAY OF LIPASE: CPT

## 2022-10-08 PROCEDURE — 83605 ASSAY OF LACTIC ACID: CPT

## 2022-10-08 PROCEDURE — 81003 URINALYSIS AUTO W/O SCOPE: CPT

## 2022-10-08 PROCEDURE — 94761 N-INVAS EAR/PLS OXIMETRY MLT: CPT

## 2022-10-08 PROCEDURE — 80053 COMPREHEN METABOLIC PANEL: CPT

## 2022-10-08 PROCEDURE — 6370000000 HC RX 637 (ALT 250 FOR IP): Performed by: INTERNAL MEDICINE

## 2022-10-08 PROCEDURE — 87635 SARS-COV-2 COVID-19 AMP PRB: CPT

## 2022-10-08 PROCEDURE — 93005 ELECTROCARDIOGRAM TRACING: CPT | Performed by: EMERGENCY MEDICINE

## 2022-10-08 PROCEDURE — 99285 EMERGENCY DEPT VISIT HI MDM: CPT

## 2022-10-08 PROCEDURE — 2580000003 HC RX 258: Performed by: EMERGENCY MEDICINE

## 2022-10-08 PROCEDURE — 36415 COLL VENOUS BLD VENIPUNCTURE: CPT

## 2022-10-08 PROCEDURE — 85730 THROMBOPLASTIN TIME PARTIAL: CPT

## 2022-10-08 PROCEDURE — 51702 INSERT TEMP BLADDER CATH: CPT

## 2022-10-08 PROCEDURE — 83880 ASSAY OF NATRIURETIC PEPTIDE: CPT

## 2022-10-08 PROCEDURE — 85610 PROTHROMBIN TIME: CPT

## 2022-10-08 PROCEDURE — 94660 CPAP INITIATION&MGMT: CPT

## 2022-10-08 PROCEDURE — 94664 DEMO&/EVAL PT USE INHALER: CPT

## 2022-10-08 PROCEDURE — 0202U NFCT DS 22 TRGT SARS-COV-2: CPT

## 2022-10-08 PROCEDURE — 85025 COMPLETE CBC W/AUTO DIFF WBC: CPT

## 2022-10-08 PROCEDURE — 82805 BLOOD GASES W/O2 SATURATION: CPT

## 2022-10-08 PROCEDURE — 71045 X-RAY EXAM CHEST 1 VIEW: CPT

## 2022-10-08 RX ORDER — LEVOTHYROXINE SODIUM 0.1 MG/1
200 TABLET ORAL DAILY
Status: DISCONTINUED | OUTPATIENT
Start: 2022-10-08 | End: 2022-10-18 | Stop reason: HOSPADM

## 2022-10-08 RX ORDER — SODIUM CHLORIDE 9 MG/ML
30 INJECTION, SOLUTION INTRAVENOUS ONCE
Status: COMPLETED | OUTPATIENT
Start: 2022-10-08 | End: 2022-10-08

## 2022-10-08 RX ORDER — HYDROCODONE BITARTRATE AND ACETAMINOPHEN 10; 325 MG/1; MG/1
1 TABLET ORAL EVERY 8 HOURS PRN
Status: ON HOLD | COMMUNITY
End: 2022-10-18 | Stop reason: SDUPTHER

## 2022-10-08 RX ORDER — SODIUM CHLORIDE 0.9 % (FLUSH) 0.9 %
5-40 SYRINGE (ML) INJECTION EVERY 12 HOURS SCHEDULED
Status: DISCONTINUED | OUTPATIENT
Start: 2022-10-08 | End: 2022-10-18 | Stop reason: HOSPADM

## 2022-10-08 RX ORDER — LEVETIRACETAM 100 MG/ML
1000 SOLUTION ORAL 2 TIMES DAILY
Status: DISCONTINUED | OUTPATIENT
Start: 2022-10-08 | End: 2022-10-18 | Stop reason: HOSPADM

## 2022-10-08 RX ORDER — ALBUTEROL SULFATE 2.5 MG/3ML
2.5 SOLUTION RESPIRATORY (INHALATION) EVERY 4 HOURS PRN
Status: DISCONTINUED | OUTPATIENT
Start: 2022-10-08 | End: 2022-10-18 | Stop reason: HOSPADM

## 2022-10-08 RX ORDER — LEVOFLOXACIN 5 MG/ML
750 INJECTION, SOLUTION INTRAVENOUS EVERY 24 HOURS
Status: COMPLETED | OUTPATIENT
Start: 2022-10-09 | End: 2022-10-15

## 2022-10-08 RX ORDER — TRAZODONE HYDROCHLORIDE 50 MG/1
200 TABLET ORAL NIGHTLY
Status: DISCONTINUED | OUTPATIENT
Start: 2022-10-08 | End: 2022-10-18 | Stop reason: HOSPADM

## 2022-10-08 RX ORDER — ENOXAPARIN SODIUM 100 MG/ML
40 INJECTION SUBCUTANEOUS DAILY
Status: DISCONTINUED | OUTPATIENT
Start: 2022-10-08 | End: 2022-10-18 | Stop reason: HOSPADM

## 2022-10-08 RX ORDER — AMLODIPINE BESYLATE 10 MG/1
10 TABLET ORAL DAILY
Status: DISCONTINUED | OUTPATIENT
Start: 2022-10-08 | End: 2022-10-18 | Stop reason: HOSPADM

## 2022-10-08 RX ORDER — IPRATROPIUM BROMIDE AND ALBUTEROL SULFATE 2.5; .5 MG/3ML; MG/3ML
3 SOLUTION RESPIRATORY (INHALATION) EVERY 4 HOURS PRN
Status: DISCONTINUED | OUTPATIENT
Start: 2022-10-08 | End: 2022-10-08

## 2022-10-08 RX ORDER — PANTOPRAZOLE SODIUM 40 MG/1
40 TABLET, DELAYED RELEASE ORAL
Status: DISCONTINUED | OUTPATIENT
Start: 2022-10-08 | End: 2022-10-18 | Stop reason: HOSPADM

## 2022-10-08 RX ORDER — ALENDRONATE SODIUM 70 MG/1
70 TABLET ORAL
Status: DISCONTINUED | OUTPATIENT
Start: 2022-10-08 | End: 2022-10-08 | Stop reason: CLARIF

## 2022-10-08 RX ORDER — LEVOFLOXACIN 5 MG/ML
750 INJECTION, SOLUTION INTRAVENOUS ONCE
Status: COMPLETED | OUTPATIENT
Start: 2022-10-08 | End: 2022-10-08

## 2022-10-08 RX ORDER — HYDROXYCHLOROQUINE SULFATE 200 MG/1
300 TABLET, FILM COATED ORAL DAILY
Status: DISCONTINUED | OUTPATIENT
Start: 2022-10-08 | End: 2022-10-18 | Stop reason: HOSPADM

## 2022-10-08 RX ORDER — IPRATROPIUM BROMIDE AND ALBUTEROL SULFATE 2.5; .5 MG/3ML; MG/3ML
1 SOLUTION RESPIRATORY (INHALATION) ONCE
Status: COMPLETED | OUTPATIENT
Start: 2022-10-08 | End: 2022-10-08

## 2022-10-08 RX ORDER — SODIUM CHLORIDE 9 MG/ML
INJECTION, SOLUTION INTRAVENOUS CONTINUOUS
Status: DISCONTINUED | OUTPATIENT
Start: 2022-10-08 | End: 2022-10-11

## 2022-10-08 RX ORDER — FAMOTIDINE 20 MG/1
20 TABLET, FILM COATED ORAL 2 TIMES DAILY
Status: DISCONTINUED | OUTPATIENT
Start: 2022-10-08 | End: 2022-10-18 | Stop reason: HOSPADM

## 2022-10-08 RX ORDER — SODIUM CHLORIDE 0.9 % (FLUSH) 0.9 %
5-40 SYRINGE (ML) INJECTION PRN
Status: DISCONTINUED | OUTPATIENT
Start: 2022-10-08 | End: 2022-10-18 | Stop reason: HOSPADM

## 2022-10-08 RX ORDER — SODIUM CHLORIDE 9 MG/ML
25 INJECTION, SOLUTION INTRAVENOUS PRN
Status: DISCONTINUED | OUTPATIENT
Start: 2022-10-08 | End: 2022-10-18 | Stop reason: HOSPADM

## 2022-10-08 RX ORDER — DULOXETIN HYDROCHLORIDE 60 MG/1
60 CAPSULE, DELAYED RELEASE ORAL DAILY
Status: DISCONTINUED | OUTPATIENT
Start: 2022-10-08 | End: 2022-10-18 | Stop reason: HOSPADM

## 2022-10-08 RX ORDER — ACETAMINOPHEN 650 MG/1
650 SUPPOSITORY RECTAL EVERY 6 HOURS PRN
Status: DISCONTINUED | OUTPATIENT
Start: 2022-10-08 | End: 2022-10-18 | Stop reason: HOSPADM

## 2022-10-08 RX ORDER — IPRATROPIUM BROMIDE AND ALBUTEROL SULFATE 2.5; .5 MG/3ML; MG/3ML
3 SOLUTION RESPIRATORY (INHALATION) 4 TIMES DAILY
Status: DISCONTINUED | OUTPATIENT
Start: 2022-10-08 | End: 2022-10-18 | Stop reason: HOSPADM

## 2022-10-08 RX ORDER — PREGABALIN 75 MG/1
300 CAPSULE ORAL 2 TIMES DAILY
Status: DISCONTINUED | OUTPATIENT
Start: 2022-10-08 | End: 2022-10-18 | Stop reason: HOSPADM

## 2022-10-08 RX ORDER — ACETAMINOPHEN 325 MG/1
650 TABLET ORAL EVERY 6 HOURS PRN
Status: DISCONTINUED | OUTPATIENT
Start: 2022-10-08 | End: 2022-10-18 | Stop reason: HOSPADM

## 2022-10-08 RX ORDER — ONDANSETRON 4 MG/1
4 TABLET, ORALLY DISINTEGRATING ORAL EVERY 8 HOURS PRN
Status: DISCONTINUED | OUTPATIENT
Start: 2022-10-08 | End: 2022-10-18 | Stop reason: HOSPADM

## 2022-10-08 RX ADMIN — SODIUM CHLORIDE: 9 INJECTION, SOLUTION INTRAVENOUS at 14:21

## 2022-10-08 RX ADMIN — SODIUM CHLORIDE 30 ML/KG/HR: 9 INJECTION, SOLUTION INTRAVENOUS at 11:35

## 2022-10-08 RX ADMIN — ENOXAPARIN SODIUM 40 MG: 100 INJECTION SUBCUTANEOUS at 15:21

## 2022-10-08 RX ADMIN — LEVOFLOXACIN 750 MG: 5 INJECTION, SOLUTION INTRAVENOUS at 12:58

## 2022-10-08 RX ADMIN — PIPERACILLIN AND TAZOBACTAM 4500 MG: 4; .5 INJECTION, POWDER, LYOPHILIZED, FOR SOLUTION INTRAVENOUS at 12:18

## 2022-10-08 RX ADMIN — PIPERACILLIN AND TAZOBACTAM 4500 MG: 4; .5 INJECTION, POWDER, LYOPHILIZED, FOR SOLUTION INTRAVENOUS at 20:21

## 2022-10-08 RX ADMIN — Medication 1000 MG: at 20:29

## 2022-10-08 RX ADMIN — SODIUM CHLORIDE: 9 INJECTION, SOLUTION INTRAVENOUS at 21:29

## 2022-10-08 RX ADMIN — FAMOTIDINE 20 MG: 20 TABLET ORAL at 20:29

## 2022-10-08 RX ADMIN — POTASSIUM BICARBONATE 20 MEQ: 782 TABLET, EFFERVESCENT ORAL at 20:29

## 2022-10-08 RX ADMIN — IPRATROPIUM BROMIDE AND ALBUTEROL SULFATE 3 ML: 2.5; .5 SOLUTION RESPIRATORY (INHALATION) at 20:13

## 2022-10-08 RX ADMIN — PREGABALIN 300 MG: 75 CAPSULE ORAL at 20:29

## 2022-10-08 RX ADMIN — IPRATROPIUM BROMIDE AND ALBUTEROL SULFATE 1 AMPULE: .5; 3 SOLUTION RESPIRATORY (INHALATION) at 10:37

## 2022-10-08 RX ADMIN — IPRATROPIUM BROMIDE AND ALBUTEROL SULFATE 3 ML: 2.5; .5 SOLUTION RESPIRATORY (INHALATION) at 14:26

## 2022-10-08 RX ADMIN — TRAZODONE HYDROCHLORIDE 200 MG: 50 TABLET ORAL at 20:29

## 2022-10-08 NOTE — PROGRESS NOTES
RESPIRATORY ASSESSMENT PROTOCOL                                                                                              Patient Name: Stephanie Wright Room#: P853/X148-69 : 1945     Admitting diagnosis: Confusion [R41.0]  CO2 retention [E87.29]  Sepsis due to pneumonia [facility] [J18.9, A41.9]  Pneumonia due to infectious organism, unspecified laterality, unspecified part of lung [J18.9]       Medical History:   Past Medical History:   Diagnosis Date    A-fib ([place] [address].)     Biventricular congestive heart failure (HCC)     Bronchiectasis with acute exacerbation ([facility] 2022    CAD (coronary artery disease)     Chronic pain syndrome     dilaudid infusion pump    COPD (chronic obstructive pulmonary disease) (HCC)     Depression     GERD (gastroesophageal reflux disease)     Hypothyroidism     Impaired fasting glucose     Iron deficiency anemia     Osteoporosis     Pulmonary fibrosis ([facility] 2022    Subdural hematoma ([place] [address].) 2022       PATIENT ASSESSMENT    LABORATORY DATA  Hematology:   Lab Results   Component Value Date/Time    WBC 11.7 10/08/2022 11:01 AM    RBC 3.93 10/08/2022 11:01 AM    HGB 12.0 10/08/2022 11:01 AM    HCT 37.2 10/08/2022 11:01 AM     10/08/2022 11:01 AM     Chemistry:    Lab Results   Component Value Date/Time    PHART 7.398 2022 10:30 AM    WOB5FZL 54.3 2022 10:30 AM    PO2ART 75.9 2022 10:30 AM    V1XUACYW 95.0 2022 10:30 AM    MMP5WHC 32.7 2022 10:30 AM    PBEA 1 2022 04:43 AM       VITALS  Heart Rate: 65   Resp: 15  BP: (!) 96/43  SpO2: 94 % O2 Device:  (wears 2L at home)  Temp: 98.4 °F (36.9 °C)    SKIN COLOR  [x] Normal  [] Pale  [] Dusky  [] Cyanotic    RESPIRATORY PATTERN  [x] Normal  [] Dyspnea  [] Cheyne-Granger  [] Kussmaul  [] Biots    AMBULATORY  [] Yes  [x] No  [] With Assistance      Patient Acuity 0 1 2 3 4 Score   Level of Concious (LOC) []  Alert & Oriented or Pt normal LOC []  Confused;follows directions []  Confused & uncooper-ative [x]  Obtunded []  Comatose 3   Respiratory Rate  (RR) [x]  Reg. rate & pattern. 12 - 20 bpm  []  Increased RR. Greater than 20 bpm   []  SOB w/ exertion or RR greater than 24 bpm []  Access- ory muscle use at rest. Abn.  resp. []  SOB at rest.   0   Bilateral Breath Sounds (BBS) []  Clear []  Diminish-ed bases  []  Diminish-ed t/o, or rales   [x]  Sporadic, scattered wheezes or rhonchi []  Persistentwheezes and, or absent BBS 3   Cough [x]  Strong, effective, & non-prod. []  Effective & prod. Less than 25 ml (2 TBSP) over past 24 hrs []  Ineffective & non-prod to less than 25 ML over past 24 hrs []  Ineffective and, or greater than 25 ml sputum prod. past 24 hrs. []  Nonspon- taneous; Requires suctioning 0   Pulmonary History  (PULM HX) []  No smoking and no chronic pulmonaryhistory []  Former smoker. Quit over 12 mos. ago []  Current smoker or quit w/ in 12 mos []  Pulm. History and, or 20 pk/yr smoking hx [x]  Admitted w/ acute pulm. dx and, or has been admitted w/ pulm. dx 2 or more times over past 12 mos 4   Surgical History this Admit  (SURG HX) [x]  No surgery []  General surgery []  Lower abdominal []  Thoracic or upper abdominal   []  Thoracic w/ pulm. disease 0   Chest X-Ray (CXR)/CT Scan []  Clear or not applicable []  Not available []  Atelect- asis or pleural effusions []  Localized infiltrate or pulm. edema [x]  Con-solidated Infiltrates, bilateral, or in more than 1 lobe 4   Slow or Forced VC, FEV1 OR PEFR (PULM FXN)  [x]  80% or greater, or not indicated []  Pt. unable to perform []  FEV1 or PEFR or VC 51-79%.   []  FEV1 or PEFR or VC  30-49%   []  FEV1 or PEFR or VC less than 30%   0   TOTAL ACUITY: 14       CARE PLAN    If Acuity Level is 2, 3, or 4 in any of the following:    [x] BILATERAL BREATH SOUNDS (BBS)     [x] PULMONARY HISTORY (PULM HX)  [] PULMONARY FUNCTION (PULM FX)    Goal: Improve respiratory functions in patients with airway disease and decrease WOB    [x] AEROSOL PROTOCOL    Total Acuity:   16-32  []  Secondary Assessment in 24 hrs Total Acuity:  9-15  [x]  Secondary Assessment in 24 hrs Total Acuity:  4-8  []  Secondary Assessment in 48 hrs Total Acuity:  0-3  []  Secondary Assessment in 72 hrs   HHN AEROSOL THERAPY with  [physician-ordered bronchodilator(s)] q 4 & Albuterol PRN q2 hrs. Breath-Actuated Neb if BBS Acuity = 4, and pt. can use MP. Notify physician if condition deteriorates. HHN AEROSOL THERAPY with  [physician-ordered bronchodilator(s)]  QID and Albuterol PRN q4 hrs. Breath-Actuated Neb if BBS Acuity = 4, and pt. can use MP. Notify physician if condition deteriorates. MDI THERAPY with  2 actuations of [physician-ordered bronchodilator(s)] via spacer TID Albuterol and PRNq4 hrs. If unable to utilize MDI: HHN [physician-ordered bronchodilator(s)] TID and Albuterol PRN q4 hrs. Notify physician if condition deteriorates. MDI THERAPY with  [physician-ordered bronchodilator(s)] via spacer TID PRN. If unable to utilize MDI: HHN [physician-ordered bronchodilator(s)] TID PRN. Notify physician if condition deteriorates. If Acuity Level is 2, 3, or 4 in any of the following:    [] COUGH     [] SURGICAL HISTORY (SURG HX)  [x] CHEST XRAY (CXR)    Goal: Improvement in sputum mobilization in patients with ineffective airway clearance. Reverse atelectasis.     [x] Bronchopulmonary Hygiene Protocol    Total Acuity:   16-32  []  Secondary Assessment in 24 hrs Total Acuity:  9-15  [x]  Secondary Assessment in 24 hrs Total Acuity:  4-8  []  Secondary Assessment in 48 hrs Total Acuity:  0-3  []  Secondary Assessment in 72 hrs   METANEB QID with [physician-ordered bronchodilator(s)] if CXR Acuity = 4; otherwise:  PD&P, PEP, or Vest QID & PRN  NT Sxn PRN for ineffective cough  METANEB QID with [physician-ordered bronchodilator(s)] if CXR Acuity = 4; otherwise:  PD&P, PEP, or Vest TID & PRN  NT Sxn PRN for ineffective cough  Instruct patient to self-perform IS q1hr WA   Directed Cough self-performed q1hr WA     If Acuity Level is 2 or above in the following:    [] PULMONARY HISTORY (PULM HX)    Goal: Assist patient in quitting smoking to slow or stop the progression of lung disease.     [] Smoking Cessation Protocol    SMOKING CESSATION EDUCATION provided according to policy QC_626: (marlyn with an X)  ____Yes    ____ No     ____ NA    Smoking Cessation Booklet given:  ____Yes  ____No ____Patient Georgette Asif

## 2022-10-08 NOTE — PROGRESS NOTES
Patient lying in bed with eyes closed, opens them and responds to voice. Bilateral lungs with rhonchi throughout. VS remain stable. Patient with ulloa in place, patent and draining clear yellow urine. Call light within reach, bed alarm on. Will continue to monitor and assess as needed.

## 2022-10-08 NOTE — PLAN OF CARE
Problem: Discharge Planning  Goal: Discharge to home or other facility with appropriate resources  Outcome: Progressing  Flowsheets (Taken 10/8/2022 1857)  Discharge to home or other facility with appropriate resources: Identify barriers to discharge with patient and caregiver     Problem: Safety - Adult  Goal: Free from fall injury  Outcome: Progressing  Flowsheets (Taken 10/8/2022 1857)  Free From Fall Injury: Instruct family/caregiver on patient safety     Problem: ABCDS Injury Assessment  Goal: Absence of physical injury  Outcome: Progressing     Problem: Skin/Tissue Integrity  Goal: Absence of new skin breakdown  Description: 1. Monitor for areas of redness and/or skin breakdown  2. Assess vascular access sites hourly  3. Every 4-6 hours minimum:  Change oxygen saturation probe site  4. Every 4-6 hours:  If on nasal continuous positive airway pressure, respiratory therapy assess nares and determine need for appliance change or resting period. Outcome: Progressing  Note: No evidence of skin breakdown. Patient being turned every two hours.

## 2022-10-08 NOTE — ED PROVIDER NOTES
[facility] ED  EMERGENCY DEPARTMENT ENCOUNTER      Pt Name: Kristie Castro  MRN: [id number]  [place] 1945  Date of evaluation: 10/8/2022  Provider: Isabel Enrique MD    CHIEF COMPLAINT       Chief Complaint   Patient presents with    Shortness of Breath     Onset this morning           HISTORY OF PRESENT ILLNESS   (Location/Symptom, Timing/Onset, Context/Setting, Quality, Duration, Modifying Factors, Severity)  Note limiting factors. Kristie Castro is a 69 y.o. female who presents to the emergency department     50-year-old patient presents via EMS with a history for some increased shortness of breath as well as decreased level of consciousness beginning on the morning of ED arrival.  Patient presents with CPAP in progress. IV has been established. Patient unable to give any history or self. Most recently the patient did have intracranial surgery in August for removal of intraventricular bleed      Nursing Notes were reviewed. REVIEW OF SYSTEMS    (2-9 systems for level 4, 10 or more for level 5)     Review of Systems   Unable to perform ROS: Acuity of condition     Except as noted above the remainder of the review of systems was reviewed and negative.        PAST MEDICAL HISTORY     Past Medical History:   Diagnosis Date    A-fib [facility])     Biventricular congestive heart failure (HCC)     Bronchiectasis with acute exacerbation ([address] 04/29/2022    CAD (coronary artery disease)     Chronic pain syndrome     dilaudid infusion pump    COPD (chronic obstructive pulmonary disease) (HCC)     Depression     GERD (gastroesophageal reflux disease)     Hypothyroidism     Impaired fasting glucose     Iron deficiency anemia     Osteoporosis     Pulmonary fibrosis ([address] 04/29/2022    Subdural hematoma ([address] 08/23/2022         SURGICAL HISTORY       Past Surgical History:   Procedure Laterality Date    BACK SURGERY  09/09/1998    spinal cord stimulator    BACK SURGERY  08/04/1999    infusion pump insertion    BACK SURGERY  10/23/2003    spinal cord stimulator removed    BACK SURGERY  10/23/2003    new infusion pump placed    BACK SURGERY  09/11/2017    replaced infusion pump    CARPAL TUNNEL RELEASE Right 12/17/1999    CARPAL TUNNEL RELEASE Left 11/24/1999    CARPAL TUNNEL RELEASE Right 12/30/2002    CARPAL TUNNEL RELEASE Left 12/17/2003    CERVICAL DISC SURGERY  10/25/2004    anterior cervical diskectomy C7-T1    CERVICAL DISC SURGERY  12/22/2004    anterior cervical discectomy K2-3 (complicated by damaged nerve to vocal cord)    CRANIOTOMY Left 8/23/2022    LEFT CRANIOTOMY FOR SUBDURAL HEMATOMA EVACUATION performed by Cleo Thompson DO at [address] Left 12/20/2018    ORIF wrist    HUMERUS FRACTURE SURGERY Right 10/03/2010    HYSTERECTOMY (CERVIX STATUS UNKNOWN)  08/20/1991    KNEE ARTHROSCOPY Right 06/02/2011    KNEE SURGERY Right 02/04/2016    repair distal portion of knee arthroplasty due to prosthesis loosening    LUMBAR [place] [address] [place] SURGERY  02/15/1994    due to scar tissue from previous surgery    LUMBAR DISCECTOMY  08/30/1993    L5-S1    LUMBAR LAMINECTOMY  06/09/2008    L3-4-5    NECK SURGERY  05/03/2002    posterior plate fusion    NECK SURGERY  12/09/2005    reconnect nerve to vocal cord [facility]    TOE AMPUTATION  10/08/2006    left 3rd toe - osteomyelitis    TOE AMPUTATION  03/07/2008    left 4th toe partial amputation    TOE AMPUTATION  10/03/2008    left 2nd toe    TOTAL KNEE ARTHROPLASTY Right 03/19/2021    WRIST FRACTURE SURGERY Left 12/20/2018    WRIST OPEN REDUCTION INTERNAL FIXATION-DISTAL RADIUS performed by Robert Mckeon MD at [facility] Left 12/20/2018    WRIST SURGERY Left 07/02/2019    left wrist ulnar shortening osteotomy         CURRENT MEDICATIONS       Previous Medications    ALBUTEROL SULFATE  (90 BASE) MCG/ACT INHALER    Inhale 2 puffs into the lungs 4 times daily    ALENDRONATE (FOSAMAX) 70 MG TABLET    Take 1 tablet by mouth every 7 days On Sundays    AMLODIPINE (NORVASC) 10 MG TABLET    Take 1 tablet by mouth daily    BENZOCAINE-MENTHOL (CEPACOL SORE THROAT) 15-3.6 MG LOZENGE    Take 1 lozenge by mouth every 2 hours as needed for Sore Throat    CALCIUM CITRATE-VITAMIN D (CITRICAL + D) 315-250 MG-UNIT TABS PER TABLET    Take 1 tablet by mouth 2 times daily (with meals)    CIPROFLOXACIN (CIPRO) 500 MG TABLET    Take 500 mg by mouth 2 times daily    DULOXETINE (CYMBALTA) 60 MG EXTENDED RELEASE CAPSULE    Take 60 mg by mouth daily    FUROSEMIDE (LASIX) 20 MG TABLET    Take 1 tablet by mouth daily    HYDROXYCHLOROQUINE (PLAQUENIL) 200 MG TABLET    Take 300 mg by mouth daily    IBUPROFEN (ADVIL;MOTRIN) 400 MG TABLET    Take 1 tablet by mouth every 6 hours as needed for Pain    IPRATROPIUM-ALBUTEROL (DUONEB) 0.5-2.5 (3) MG/3ML SOLN NEBULIZER SOLUTION    3 mLs    LEVETIRACETAM (KEPPRA) 100 MG/ML SOLUTION    Take 10 mLs by mouth 2 times daily    LEVOTHYROXINE (SYNTHROID) 200 MCG TABLET    Take 1 tablet by mouth Daily    ONDANSETRON (ZOFRAN-ODT) 4 MG DISINTEGRATING TABLET    Take 4 mg by mouth every 8 hours as needed for Nausea or Vomiting    PANTOPRAZOLE SODIUM (PROTONIX) 40 MG PACK PACKET    Take 1 packet by mouth in the morning and 1 packet in the evening. Take before meals. POTASSIUM CHLORIDE (KLOR-CON) 20 MEQ PACKET    Take 20 mEq by mouth 2 times daily    PREGABALIN (LYRICA) 300 MG CAPSULE    Take 1 capsule by mouth 2 times daily for 30 days. PREGABALIN (LYRICA) 300 MG CAPSULE    Take 300 mg by mouth 2 times daily. SERTRALINE (ZOLOFT) 50 MG TABLET    Take 1 tablet by mouth daily    TRAZODONE (DESYREL) 100 MG TABLET    Take 200 mg by mouth nightly    UMECLIDINIUM-VILANTEROL (ANORO ELLIPTA) 62.5-25 MCG/INH AEPB INHALER    Inhale 1 puff into the lungs daily       ALLERGIES     Patient has no known allergies.     FAMILY HISTORY       Family History   Problem Relation Age of Onset    Heart Attack Father 61    Heart Attack Sister Heart Disease Brother           SOCIAL HISTORY       Social History     Socioeconomic History    Marital status:    Tobacco Use    Smoking status: Former     Packs/day: 1.00     Years: 10.00     Pack years: 10.00     Types: Cigarettes     Quit date: 1976     Years since quittin.9    Smokeless tobacco: Never   Vaping Use    Vaping Use: Never used   Substance and Sexual Activity    Alcohol use: No    Drug use: No     Social Determinants of Health     Financial Resource Strain: Low Risk     Difficulty of Paying Living Expenses: Not hard at all   Food Insecurity: No Food Insecurity    Worried About Running Out of Food in the Last Year: Never true    Ran Out of Food in the Last Year: Never true   Physical Activity: Inactive    Days of Exercise per Week: 0 days    Minutes of Exercise per Session: 0 min       SCREENINGS        Kristy Coma Scale  Eye Opening: To speech  Best Verbal Response: Oriented  Best Motor Response: Obeys commands  [place] Coma Scale Score: 14               PHYSICAL EXAM    (up to 7 for level 4, 8 or more for level 5)     ED Triage Vitals   BP Temp Temp src Pulse Resp SpO2 Height Weight   -- -- -- -- -- -- -- --       Physical Exam  Vitals and nursing note reviewed. Constitutional:       Appearance: She is ill-appearing. Comments: Patient does respond to her name and follows commands   HENT:      Head: Normocephalic. Comments: Well-healed surgical incisional scar about the patient's scalp     Nose: Nose normal.      Mouth/Throat:      Mouth: Mucous membranes are moist.      Pharynx: No posterior oropharyngeal erythema. Eyes:      Extraocular Movements: Extraocular movements intact. Pupils: Pupils are equal, round, and reactive to light. Neck:      Comments: No meningeal signs the neck is supple  Cardiovascular:      Rate and Rhythm: Normal rate and regular rhythm. Pulses: Normal pulses. Heart sounds: Normal heart sounds.    Pulmonary:      Effort: Pulmonary effort is normal.      Comments: Entry is adequate per auscultation  Abdominal:      General: Abdomen is flat. There is no distension. Palpations: Abdomen is soft. There is no mass. Tenderness: There is no abdominal tenderness. Hernia: No hernia is present. Musculoskeletal:         General: Normal range of motion. Cervical back: Normal range of motion. No rigidity or tenderness. Right lower leg: No edema. Left lower leg: No edema. Skin:     General: Skin is warm. Capillary Refill: Capillary refill takes less than 2 seconds. Findings: No bruising, erythema, lesion or rash. Neurological:      General: No focal deficit present. Cranial Nerves: No cranial nerve deficit. Motor: Weakness (Symmetrical) present. Comments: Hand grasp adequate bilaterally       DIAGNOSTIC RESULTS     EKG: All EKG's are interpreted by the Emergency Department Physician who either signs or Co-signs this chart in the absence of a cardiologist.      RADIOLOGY:   Non-plain film images such as CT, Ultrasound and MRI are read by the radiologist. Plain radiographic images are visualized and preliminarily interpreted by the emergency physician with the below findings:      Interpretation per the Radiologist below, if available at the time of this note:    CT Head W/O Contrast   Final Result   No acute process. No significant change in primarily low-density left   convexity extra-axial collection. XR CHEST PORTABLE   Final Result   Suspected bilateral infectious/inflammatory airways process.                ED BEDSIDE ULTRASOUND:   Performed by ED Physician - none    LABS:  Labs Reviewed   BLOOD GAS, VENOUS - Abnormal; Notable for the following components:       Result Value    pCO2, Micheal 60.7 (*)     HCO3, Venous 33.7 (*)     Positive Base Excess, Micheal 6.1 (*)     All other components within normal limits   CBC WITH AUTO DIFFERENTIAL - Abnormal; Notable for the following components:    WBC 11.7 (*)     RBC 3.93 (*)     Seg Neutrophils 86 (*)     Lymphocytes 5 (*)     Segs Absolute 10.00 (*)     Absolute Lymph # 0.56 (*)     All other components within normal limits   COMPREHENSIVE METABOLIC PANEL - Abnormal; Notable for the following components:    Glucose 121 (*)     Bun/Cre Ratio 24 (*)     Potassium 3.4 (*)     CO2 35 (*)     Anion Gap 6 (*)     All other components within normal limits   COVID-19, RAPID   CULTURE, BLOOD 1   CULTURE, BLOOD 2   LACTATE, SEPSIS   LIPASE   PROTIME-INR   URINALYSIS   APTT   TROPONIN   BRAIN NATRIURETIC PEPTIDE   LACTATE, SEPSIS       All other labs were within normal range or not returned as of this dictation.     EMERGENCY DEPARTMENT COURSE and DIFFERENTIAL DIAGNOSIS/MDM:   Vitals:    Vitals:    10/08/22 1100 10/08/22 1115 10/08/22 1117 10/08/22 1145   BP: (!) 95/57 (!) 102/54  (!) 94/41   Pulse: 93 88  76   Resp: 18 14  17   SpO2: 94% 94%  95%   Weight:   145 lb (65.8 kg)          MDM  Number of Diagnoses or Management Options  CO2 retention  Pneumonia due to infectious organism, unspecified laterality, unspecified part of lung  Diagnosis management comments: 80-year-old patient presents emergency department as documented in HPI    Diagnostic considerations intracerebral bleed, hypoglycemia, electrolyte imbalance, UTI, pneumonia, ACS syndrome sepsis    Upon arrival code sepsis was initiated including but not limited to fluid bolus of 30 mill per kilogram of normal saline, blood cultures and antibiotics as selected by the code sepsis protocol    Assessment during emergency department course patient became much more alert responsive to her name moving all extremities    The mucous membranes are moist capillary iris is less than 2 seconds blood pressure was somewhat tenuous with a systolic blood pressure 94, pulse oximeter 95% on BiPAP machine    Consultation undertaken with Dr. Kourtney Benz we will have the patient admitted          REASSESSMENT   As documented in the [facility]    ED Course as of 10/08/22 1207   Sat Oct 08, 2022   1026 Performed at 1013-the ventricular rate is 111-sinus tachycardia with premature atrial complexes TX interval 0.202 QRS duration 0.080 QTC corrected 0.421 there is no ST segment elevation or suicidal poor R wave progression [RS]   1112 CT Head W/O Contrast  Ct brain no acute processes radiologist read [RS]   1113 XR CHEST PORTABLE  This patient is bilateral reticular opacities [RS]   1201 CMP(!):    Glucose, Random 121(!)   BUN,BUNPL 13   Creatinine 0.55   Est, Glom Filt Rate >60   Bun/Cre Ratio 24(!)   CALCIUM, SERUM, 173822 9.4   Sodium 139   Potassium 3.4(!)   Chloride 98   CO2 35(!)   Anion Gap 6(!)   Alk Phos 98   ALT 11   AST 18   Bilirubin 0.3   Total Protein 6.6   Albumin 3.9   ALBUMIN/GLOBULIN RATIO 1.4 [RS]   1201 Lactate, Sepsis:    Lactic Acid, Sepsis 1.2 [RS]   1201 COVID-19, Rapid:    Specimen Description . NASOPHARYNGEAL SWAB   SARS-CoV-2, Rapid Not Detected [RS]      ED Course User Index  [RS] Betsy Nichols MD         CRITICAL CARE TIME   Total Critical Care time 50 was minutes, excluding separately reportable procedures. There was a high probability of clinically significant/life threatening deterioration in the patient's condition which required my urgent intervention. CONSULTS:  As documented in the [facility]    PROCEDURES:  Unless otherwise noted below, none     Procedures    FINAL IMPRESSION      1. Pneumonia due to infectious organism, unspecified laterality, unspecified part of lung    2. CO2 retention    3. Confusion          DISPOSITION/PLAN   DISPOSITION Admitted 10/08/2022 12:05:59 PM      PATIENT REFERRED TO:  No follow-up provider specified. DISCHARGE MEDICATIONS:  New Prescriptions    No medications on file     Controlled Substances Monitoring:     RX Monitoring 12/17/2018   Attestation The Prescription Monitoring Report for this patient was reviewed today.    Periodic Controlled Substance Monitoring No signs of potential drug abuse or diversion identified.        (Please note that portions of this note were completed with a voice recognition program.  Efforts were made to edit the dictations but occasionally words are mis-transcribed.)    Bernadine Huang MD (electronically signed)  Attending Emergency Physician           Bernadine Huang MD  10/08/22 1318       Bernadine Huang MD  10/08/22 [id number]

## 2022-10-08 NOTE — PROGRESS NOTES
Lactic drawn at this time. Writer assisted patient in removing BiPAP for a few seconds to allow patient to expectorate sputum and mask was replaced without any air leak. Thick yellow sputum noted. Patient lungs remain rhonchi throughout. Afebrile temp. Patient remains lethargic but able to hold small conversation. Call light within reach and ulloa in place. Bed alarm on. Will continue to monitor.

## 2022-10-08 NOTE — PROGRESS NOTES
Pt arrived to unit from ER to room 304 for sepsis due to pneumonia. Pt moved to bed 3 assist from stretcher. Pt is on BiPAP at 32%, respirations even and unlabored. Vitals obtained. Admission assessment completed. Admission navigator partially completed with  as pt is still very lethargic. No needs noted at this time. Call light in reach. Will continue to monitor.

## 2022-10-08 NOTE — PROGRESS NOTES
Oral Bisphosphonates have an increased risk of serious GI events if the strict dosing instructions are not followed. Per the FDA labeling, oral bisphosphonates must be taken at least 30 min (60 min for ibandronate) before the first food, beverage or medication of the day. Patients MUST also avoid lying down for at least 30 min (60 minutes for ibandronate) after taking the oral bisphosphonate. Because these strict dosage instructions are difficult to follow in many hospitalized patients, orders for oral bisphosphonate, alendronate (Fosamax), therapy will be discontinued per the [address] [place] [address]. Consider risk versus benefits when resuming the oral bisphosphonate at discharge.     Medication Held:    alendronate (FOSAMAX) tablet 70 mg    Thank you,  Terrance Sam, PharmD 10/8/2022 2:02 PM

## 2022-10-09 LAB
ABSOLUTE EOS #: 0.16 K/UL (ref 0–0.44)
ABSOLUTE IMMATURE GRANULOCYTE: <0.03 K/UL (ref 0–0.3)
ABSOLUTE LYMPH #: 0.94 K/UL (ref 1.1–3.7)
ABSOLUTE MONO #: 0.65 K/UL (ref 0.1–1.2)
ALBUMIN SERPL-MCNC: 2.9 G/DL (ref 3.5–5.2)
ALBUMIN/GLOBULIN RATIO: 1.2 (ref 1–2.5)
ALLEN TEST: ABNORMAL
ALP BLD-CCNC: 72 U/L (ref 35–104)
ALT SERPL-CCNC: 7 U/L (ref 5–33)
ANION GAP SERPL CALCULATED.3IONS-SCNC: 5 MMOL/L (ref 9–17)
AST SERPL-CCNC: 10 U/L
BASOPHILS # BLD: 1 % (ref 0–2)
BASOPHILS ABSOLUTE: 0.03 K/UL (ref 0–0.2)
BILIRUB SERPL-MCNC: 0.4 MG/DL (ref 0.3–1.2)
BUN BLDV-MCNC: 10 MG/DL (ref 8–23)
BUN/CREAT BLD: 19 (ref 9–20)
CALCIUM SERPL-MCNC: 8.5 MG/DL (ref 8.6–10.4)
CHLORIDE BLD-SCNC: 106 MMOL/L (ref 98–107)
CO2: 31 MMOL/L (ref 20–31)
CREAT SERPL-MCNC: 0.53 MG/DL (ref 0.5–0.9)
EOSINOPHILS RELATIVE PERCENT: 3 % (ref 1–4)
FIO2: 28
GFR SERPL CREATININE-BSD FRML MDRD: >60 ML/MIN/1.73M2
GLUCOSE BLD-MCNC: 96 MG/DL (ref 70–99)
HCO3 ARTERIAL: 28.5 MMOL/L (ref 22–26)
HCT VFR BLD CALC: 32.5 % (ref 36.3–47.1)
HEMOGLOBIN: 10.4 G/DL (ref 11.9–15.1)
IMMATURE GRANULOCYTES: 0 %
LYMPHOCYTES # BLD: 15 % (ref 24–43)
MCH RBC QN AUTO: 30.7 PG (ref 25.2–33.5)
MCHC RBC AUTO-ENTMCNC: 32 G/DL (ref 28.4–34.8)
MCV RBC AUTO: 95.9 FL (ref 82.6–102.9)
MONOCYTES # BLD: 11 % (ref 3–12)
NRBC AUTOMATED: 0 PER 100 WBC
O2 DEVICE/FLOW/%: ABNORMAL
O2 SAT, ARTERIAL: 95.2 % (ref 95–98)
PATIENT TEMP: 37
PCO2 ARTERIAL: 49.4 MMHG (ref 35–45)
PDW BLD-RTO: 13.5 % (ref 11.8–14.4)
PH ARTERIAL: 7.38 (ref 7.35–7.45)
PLATELET # BLD: 201 K/UL (ref 138–453)
PMV BLD AUTO: 9.8 FL (ref 8.1–13.5)
PO2 ARTERIAL: 78.2 MMHG (ref 80–100)
POSITIVE BASE EXCESS, ART: 2.4 MMOL/L (ref 0–2)
POTASSIUM SERPL-SCNC: 3.8 MMOL/L (ref 3.7–5.3)
PT. POSITION: ABNORMAL
RBC # BLD: 3.39 M/UL (ref 3.95–5.11)
SAMPLE SITE: ABNORMAL
SEG NEUTROPHILS: 71 % (ref 36–65)
SEGMENTED NEUTROPHILS ABSOLUTE COUNT: 4.37 K/UL (ref 1.5–8.1)
SODIUM BLD-SCNC: 142 MMOL/L (ref 135–144)
TOTAL PROTEIN: 5.4 G/DL (ref 6.4–8.3)
WBC # BLD: 6.2 K/UL (ref 3.5–11.3)

## 2022-10-09 PROCEDURE — 2700000000 HC OXYGEN THERAPY PER DAY

## 2022-10-09 PROCEDURE — 36600 WITHDRAWAL OF ARTERIAL BLOOD: CPT

## 2022-10-09 PROCEDURE — 6360000002 HC RX W HCPCS: Performed by: INTERNAL MEDICINE

## 2022-10-09 PROCEDURE — 6370000000 HC RX 637 (ALT 250 FOR IP): Performed by: INTERNAL MEDICINE

## 2022-10-09 PROCEDURE — 97162 PT EVAL MOD COMPLEX 30 MIN: CPT

## 2022-10-09 PROCEDURE — 97166 OT EVAL MOD COMPLEX 45 MIN: CPT

## 2022-10-09 PROCEDURE — 36415 COLL VENOUS BLD VENIPUNCTURE: CPT

## 2022-10-09 PROCEDURE — 94660 CPAP INITIATION&MGMT: CPT

## 2022-10-09 PROCEDURE — 2000000000 HC ICU R&B

## 2022-10-09 PROCEDURE — 94664 DEMO&/EVAL PT USE INHALER: CPT

## 2022-10-09 PROCEDURE — 2580000003 HC RX 258: Performed by: INTERNAL MEDICINE

## 2022-10-09 PROCEDURE — 94640 AIRWAY INHALATION TREATMENT: CPT

## 2022-10-09 PROCEDURE — 85025 COMPLETE CBC W/AUTO DIFF WBC: CPT

## 2022-10-09 PROCEDURE — 82805 BLOOD GASES W/O2 SATURATION: CPT

## 2022-10-09 PROCEDURE — 94669 MECHANICAL CHEST WALL OSCILL: CPT

## 2022-10-09 PROCEDURE — 94761 N-INVAS EAR/PLS OXIMETRY MLT: CPT

## 2022-10-09 PROCEDURE — 80053 COMPREHEN METABOLIC PANEL: CPT

## 2022-10-09 PROCEDURE — 97530 THERAPEUTIC ACTIVITIES: CPT

## 2022-10-09 RX ORDER — HYDROCODONE BITARTRATE AND ACETAMINOPHEN 10; 325 MG/1; MG/1
1 TABLET ORAL EVERY 6 HOURS PRN
Status: DISCONTINUED | OUTPATIENT
Start: 2022-10-09 | End: 2022-10-14

## 2022-10-09 RX ADMIN — IPRATROPIUM BROMIDE AND ALBUTEROL SULFATE 3 ML: 2.5; .5 SOLUTION RESPIRATORY (INHALATION) at 19:47

## 2022-10-09 RX ADMIN — ACETAMINOPHEN 650 MG: 325 TABLET, FILM COATED ORAL at 05:14

## 2022-10-09 RX ADMIN — LEVOTHYROXINE SODIUM 200 MCG: 100 TABLET ORAL at 07:29

## 2022-10-09 RX ADMIN — FAMOTIDINE 20 MG: 20 TABLET ORAL at 20:51

## 2022-10-09 RX ADMIN — SODIUM CHLORIDE: 9 INJECTION, SOLUTION INTRAVENOUS at 03:53

## 2022-10-09 RX ADMIN — HYDROCODONE BITARTRATE AND ACETAMINOPHEN 1 TABLET: 10; 325 TABLET ORAL at 20:51

## 2022-10-09 RX ADMIN — TRAZODONE HYDROCHLORIDE 200 MG: 50 TABLET ORAL at 20:52

## 2022-10-09 RX ADMIN — DULOXETINE HYDROCHLORIDE 60 MG: 60 CAPSULE, DELAYED RELEASE ORAL at 07:42

## 2022-10-09 RX ADMIN — PREGABALIN 300 MG: 75 CAPSULE ORAL at 20:51

## 2022-10-09 RX ADMIN — Medication 1000 MG: at 20:52

## 2022-10-09 RX ADMIN — IPRATROPIUM BROMIDE AND ALBUTEROL SULFATE 3 ML: 2.5; .5 SOLUTION RESPIRATORY (INHALATION) at 16:24

## 2022-10-09 RX ADMIN — HYDROXYCHLOROQUINE SULFATE 300 MG: 200 TABLET ORAL at 07:42

## 2022-10-09 RX ADMIN — SERTRALINE HYDROCHLORIDE 50 MG: 50 TABLET ORAL at 07:43

## 2022-10-09 RX ADMIN — PANTOPRAZOLE SODIUM 40 MG: 40 TABLET, DELAYED RELEASE ORAL at 07:29

## 2022-10-09 RX ADMIN — PIPERACILLIN AND TAZOBACTAM 4500 MG: 4; .5 INJECTION, POWDER, LYOPHILIZED, FOR SOLUTION INTRAVENOUS at 20:14

## 2022-10-09 RX ADMIN — TIOTROPIUM BROMIDE AND OLODATEROL 2 PUFF: 3.124; 2.736 SPRAY, METERED RESPIRATORY (INHALATION) at 11:35

## 2022-10-09 RX ADMIN — HYDROCODONE BITARTRATE AND ACETAMINOPHEN 1 TABLET: 10; 325 TABLET ORAL at 11:28

## 2022-10-09 RX ADMIN — PREGABALIN 300 MG: 75 CAPSULE ORAL at 07:42

## 2022-10-09 RX ADMIN — PIPERACILLIN AND TAZOBACTAM 4500 MG: 4; .5 INJECTION, POWDER, LYOPHILIZED, FOR SOLUTION INTRAVENOUS at 13:25

## 2022-10-09 RX ADMIN — IPRATROPIUM BROMIDE AND ALBUTEROL SULFATE 3 ML: 2.5; .5 SOLUTION RESPIRATORY (INHALATION) at 05:21

## 2022-10-09 RX ADMIN — IPRATROPIUM BROMIDE AND ALBUTEROL SULFATE 3 ML: 2.5; .5 SOLUTION RESPIRATORY (INHALATION) at 11:35

## 2022-10-09 RX ADMIN — POTASSIUM BICARBONATE 20 MEQ: 782 TABLET, EFFERVESCENT ORAL at 07:42

## 2022-10-09 RX ADMIN — SODIUM CHLORIDE: 9 INJECTION, SOLUTION INTRAVENOUS at 10:39

## 2022-10-09 RX ADMIN — PIPERACILLIN AND TAZOBACTAM 4500 MG: 4; .5 INJECTION, POWDER, LYOPHILIZED, FOR SOLUTION INTRAVENOUS at 03:56

## 2022-10-09 RX ADMIN — LEVOFLOXACIN 750 MG: 5 INJECTION, SOLUTION INTRAVENOUS at 11:30

## 2022-10-09 RX ADMIN — Medication 1000 MG: at 07:43

## 2022-10-09 RX ADMIN — SODIUM CHLORIDE: 9 INJECTION, SOLUTION INTRAVENOUS at 17:29

## 2022-10-09 RX ADMIN — ENOXAPARIN SODIUM 40 MG: 100 INJECTION SUBCUTANEOUS at 07:43

## 2022-10-09 RX ADMIN — POTASSIUM BICARBONATE 20 MEQ: 782 TABLET, EFFERVESCENT ORAL at 20:51

## 2022-10-09 RX ADMIN — FAMOTIDINE 20 MG: 20 TABLET ORAL at 07:42

## 2022-10-09 RX ADMIN — ACETAMINOPHEN 650 MG: 325 TABLET, FILM COATED ORAL at 17:28

## 2022-10-09 ASSESSMENT — PAIN - FUNCTIONAL ASSESSMENT
PAIN_FUNCTIONAL_ASSESSMENT: ACTIVITIES ARE NOT PREVENTED

## 2022-10-09 ASSESSMENT — PAIN SCALES - GENERAL
PAINLEVEL_OUTOF10: 0
PAINLEVEL_OUTOF10: 0
PAINLEVEL_OUTOF10: 7
PAINLEVEL_OUTOF10: 5
PAINLEVEL_OUTOF10: 0
PAINLEVEL_OUTOF10: 4
PAINLEVEL_OUTOF10: 9
PAINLEVEL_OUTOF10: 0
PAINLEVEL_OUTOF10: 0
PAINLEVEL_OUTOF10: 4
PAINLEVEL_OUTOF10: 0
PAINLEVEL_OUTOF10: 5
PAINLEVEL_OUTOF10: 0
PAINLEVEL_OUTOF10: 0
PAINLEVEL_OUTOF10: 10
PAINLEVEL_OUTOF10: 0

## 2022-10-09 ASSESSMENT — PAIN DESCRIPTION - DESCRIPTORS
DESCRIPTORS: PATIENT UNABLE TO DESCRIBE
DESCRIPTORS: ACHING

## 2022-10-09 ASSESSMENT — PAIN DESCRIPTION - LOCATION
LOCATION: BACK
LOCATION: BACK;LEG

## 2022-10-09 ASSESSMENT — PAIN SCALES - WONG BAKER
WONGBAKER_NUMERICALRESPONSE: 0
WONGBAKER_NUMERICALRESPONSE: 2
WONGBAKER_NUMERICALRESPONSE: 0
WONGBAKER_NUMERICALRESPONSE: 2
WONGBAKER_NUMERICALRESPONSE: 0
WONGBAKER_NUMERICALRESPONSE: 2

## 2022-10-09 ASSESSMENT — PAIN DESCRIPTION - ONSET
ONSET: ON-GOING
ONSET: ON-GOING

## 2022-10-09 ASSESSMENT — PAIN DESCRIPTION - FREQUENCY
FREQUENCY: CONTINUOUS
FREQUENCY: CONTINUOUS

## 2022-10-09 ASSESSMENT — PAIN DESCRIPTION - ORIENTATION
ORIENTATION: RIGHT;LEFT;MID;LOWER
ORIENTATION: RIGHT;LEFT
ORIENTATION: RIGHT;MID;LEFT
ORIENTATION: MID

## 2022-10-09 ASSESSMENT — PAIN DESCRIPTION - PAIN TYPE
TYPE: CHRONIC PAIN
TYPE: CHRONIC PAIN

## 2022-10-09 NOTE — PROGRESS NOTES
Dr. Suyapa Mckeon notified of patients diastolic blood pressure lower than 50. Patient is asymptomatic, responding to voice. No new orders received at this time.

## 2022-10-09 NOTE — PLAN OF CARE
Problem: Discharge Planning  Goal: Discharge to home or other facility with appropriate resources  10/9/2022 1917 by Elke Almaraz RN  Outcome: Progressing  Flowsheets (Taken 10/9/2022 1909)  Discharge to home or other facility with appropriate resources:   Identify barriers to discharge with patient and caregiver   Arrange for needed discharge resources and transportation as appropriate   Identify discharge learning needs (meds, wound care, etc)   Refer to discharge planning if patient needs post-hospital services based on physician order or complex needs related to functional status, cognitive ability or social support system     Problem: Safety - Adult  Goal: Free from fall injury  10/9/2022 1917 by Elke Almaraz RN  Outcome: Progressing     Problem: ABCDS Injury Assessment  Goal: Absence of physical injury  10/9/2022 1917 by Elke Almaraz RN  Outcome: Progressing     Problem: Skin/Tissue Integrity  Goal: Absence of new skin breakdown  Description: 1. Monitor for areas of redness and/or skin breakdown  2. Assess vascular access sites hourly  3. Every 4-6 hours minimum:  Change oxygen saturation probe site  4. Every 4-6 hours:  If on nasal continuous positive airway pressure, respiratory therapy assess nares and determine need for appliance change or resting period.   10/9/2022 1917 by Elke Almaraz RN  Outcome: Progressing     Problem: Pain  Goal: Verbalizes/displays adequate comfort level or baseline comfort level  Outcome: Progressing  Flowsheets (Taken 10/9/2022 1900)  Verbalizes/displays adequate comfort level or baseline comfort level:   Encourage patient to monitor pain and request assistance   Assess pain using appropriate pain scale   Administer analgesics based on type and severity of pain and evaluate response   Implement non-pharmacological measures as appropriate and evaluate response   Notify Licensed Independent Practitioner if interventions unsuccessful or patient reports new pain   Consider cultural and social influences on pain and pain management

## 2022-10-09 NOTE — PROGRESS NOTES
1900 - Assessment completed. Vitals signs obtained and documented. Lung sounds contain fine crackles throughout lung fields. Abdomen soft; non tender. Sheikh draining clear yellow urine. Patient continues on 2 liters n/c. C/o lower back pain and requests a \"pain med\" at bedtime. Patient denies needs at this time. Call light within reach. Will continue to monitor.

## 2022-10-09 NOTE — PROGRESS NOTES
Physical Therapy  Facility/Department: [facility] AT THE [facility]  Physical Therapy Initial Assessment    Name: Citlali Curtis  : 1945  MRN: [id number]  Date of Service: 10/9/2022    Discharge Recommendations:  Continue to assess pending progress, Subacute/Skilled Nursing Facility   PT Equipment Recommendations  Equipment Needed: No      Patient Diagnosis(es): The primary encounter diagnosis was Pneumonia due to infectious organism, unspecified laterality, unspecified part of lung. Diagnoses of CO2 retention and Confusion were also pertinent to this visit. Past Medical History:  has a past medical history of A-fib ([address] Biventricular congestive heart failure ([address] Bronchiectasis with acute exacerbation ([address] CAD (coronary artery disease), Chronic pain syndrome, COPD (chronic obstructive pulmonary disease) ([address] Depression, GERD (gastroesophageal reflux disease), Hypothyroidism, Impaired fasting glucose, Iron deficiency anemia, Osteoporosis, Pulmonary fibrosis ([address] and Subdural hematoma ([address] Past Surgical History:  has a past surgical history that includes Hysterectomy (1991); Carpal tunnel release (Right, 1999); Total knee arthroplasty (Right, 2021); fracture surgery (Left, 2018); Wrist fracture surgery (Left, 2018); lumbar discectomy (1993); Lumbar disc surgery (02/15/1994); back surgery (1998); back surgery (1999); Carpal tunnel release (Left, 1999); Neck surgery (2002); Carpal tunnel release (Right, 2002); Carpal tunnel release (Left, 2003); back surgery (10/23/2003); back surgery (10/23/2003); Cervical disc surgery (10/25/2004); Cervical disc surgery (2004); Neck surgery (2005); Toe amputation (10/08/2006); Toe amputation (2008); lumbar laminectomy (2008); Toe amputation (10/03/2008); Humerus fracture surgery (Right, 10/03/2010);  Knee arthroscopy (Right, 2011); back surgery (2017); knee surgery (Right, 02/04/2016); Wrist fracture surgery (Left, 12/20/2018); Wrist surgery (Left, 07/02/2019); and craniotomy (Left, 8/23/2022). Assessment   Body Structures, Functions, Activity Limitations Requiring Skilled Therapeutic Intervention: Decreased functional mobility ; Decreased ADL status; Decreased strength;Decreased endurance;Decreased balance;Decreased high-level IADLs  Assessment: DX SEPSIS,PNEUMONIA. MOD ASSIST BED MOBILITY,CGA TRANSFERS,CGA GAIT FWW. PT NECESSARY TO ADDRESS THESE DEFICITS. Treatment Diagnosis: GENERALIZED WEAKNESS. Therapy Prognosis: Good  Decision Making: Medium Complexity  Activity Tolerance  Activity Tolerance: Patient tolerated treatment well     Plan   Physcial Therapy Plan  General Plan: 2 times a day 7 days a week  Current Treatment Recommendations: Strengthening, Balance training, Functional mobility training, Transfer training, ADL/Self-care training, Neuromuscular re-education, Gait training, Safety education & training, Patient/Caregiver education & training  Safety Devices  Type of Devices: Call light within reach, Left in chair, Nurse notified  Restraints  Restraints Initially in Place: No     Restrictions  Restrictions/Precautions  Restrictions/Precautions: General Precautions  Required Braces or Orthoses?: No     Subjective   Pain: PAIN LEVEL 0/10. General  Chart Reviewed: Yes  Patient assessed for rehabilitation services?: Yes  Additional Pertinent Hx: PNEUMONIA,SEPSIS  Diagnosis: PNEUMONIA,SEPSIS.   Follows Commands: Within Functional Limits         Social/Functional History  Social/Functional History  Lives With: Spouse  Type of Home: House  Vision/Hearing  Vision  Vision: Within Functional Limits  Hearing  Hearing: Within functional limits    Cognition   Orientation  Overall Orientation Status: Within Normal Limits  Cognition  Overall Cognitive Status: WNL     Objective   Heart Rate: 71  Heart Rate Source: Apical;Monitor  BP: (!) 110/53  BP Location: Right upper arm  BP Method: Automatic  Patient Position: Semi fowlers  MAP (Calculated): 72  Resp: 15  SpO2: 93 %  O2 Device: Nasal cannula     Observation/Palpation  Posture: Fair        AROM RLE (degrees)  RLE AROM: WNL  AROM LLE (degrees)  LLE AROM : WNL  AROM RUE (degrees)  RUE AROM : Exceptions  RUE General AROM: 90 SHLD FLEX  AROM LUE (degrees)  LUE AROM : Exceptions  LUE General AROM: 90 SHLD FLEX. Strength RLE  Strength RLE: Exception  Comment: 3/5  Strength LLE  Strength LLE: Exception  Comment: 3/5  Strength RUE  Strength RUE: Exception  Comment: 3/5  Strength LUE  Strength LUE: Exception  Comment: 3/5        Bed Mobility Training  Bed Mobility Training: Yes  Overall Level of Assistance: Moderate assistance  Interventions: Demonstration  Rolling: Moderate assistance  Supine to Sit: Moderate assistance  Sit to Supine: Moderate assistance  Scooting: Moderate assistance  Balance  Sitting: Intact  Standing: Impaired  Standing - Static: Fair  Standing - Dynamic: Fair  Transfer Training  Transfer Training: Yes  Overall Level of Assistance: Contact-guard assistance  Interventions: Demonstration  Sit to Stand: Contact-guard assistance  Stand to Sit: Contact-guard assistance  Stand Pivot Transfers: Contact-guard assistance  Bed to Chair: Contact-guard assistance  Gait Training  Gait Training: Yes  Right Side Weight Bearing: As tolerated  Left Side Weight Bearing: As tolerated  Gait  Overall Level of Assistance: Contact-guard assistance  Interventions: Demonstration  Base of Support: Widened  Distance (ft): 10 Feet  Assistive Device: Walker, rolling                 Exercise Treatment: THER EX SITTING. OutComes Score                                                  AM-PAC Score             Tinneti Score       Goals  Short Term Goals  Time Frame for Short Term Goals: 3 DAYS. Short Term Goal 1: CGA GAIT 50 FT FWW  Short Term Goal 2: SBA TRANSFERS AND SBA BED MOBILITY.   Long Term Goals  Time Frame for Long Term Goals : 4 WEEKS  Long Term Goal 1: IND GAIT  FT,  Long Term Goal 2: IND TRANSFERS  Patient Goals   Patient Goals : RETURN HOME WITH ASSIST FROM .        Education  Patient Education  Education Given To: Patient  Education Provided: Role of Therapy;Transfer Training (GAIT TRAINING.)  Education Method: Demonstration  Barriers to Learning: None  Education Outcome: Verbalized understanding      Therapy Time   Individual Concurrent Group Co-treatment   Time In 0845         Time Out 0915         Minutes 30         Timed Code Treatment Minutes: Dot Vargas, PT

## 2022-10-09 NOTE — RT PROTOCOL NOTE
RESPIRATORY ASSESSMENT PROTOCOL                                                                                              Patient Name: Kaitlin Vivar Room#: S717/K906-74 : 1945     Admitting diagnosis: Confusion [R41.0]  CO2 retention [E87.29]  Sepsis due to pneumonia ([facility] [J18.9, A41.9]  Pneumonia due to infectious organism, unspecified laterality, unspecified part of lung [J18.9]       Medical History:   Past Medical History:   Diagnosis Date    A-fib ([place] [address]     Biventricular congestive heart failure (HCC)     Bronchiectasis with acute exacerbation ([facility] 2022    CAD (coronary artery disease)     Chronic pain syndrome     dilaudid infusion pump    COPD (chronic obstructive pulmonary disease) ([facility])     Depression     GERD (gastroesophageal reflux disease)     Hypothyroidism     Impaired fasting glucose     Iron deficiency anemia     Osteoporosis     Pulmonary fibrosis ([place] [address] 2022    Subdural hematoma ([place] [address] 2022       PATIENT ASSESSMENT    LABORATORY DATA  Hematology:   Lab Results   Component Value Date/Time    WBC 6.2 10/09/2022 05:15 AM    RBC 3.39 10/09/2022 05:15 AM    HGB 10.4 10/09/2022 05:15 AM    HCT 32.5 10/09/2022 05:15 AM     10/09/2022 05:15 AM     Chemistry:    Lab Results   Component Value Date/Time    PHART 7.398 2022 10:30 AM    YLM7EVX 54.3 2022 10:30 AM    PO2ART 75.9 2022 10:30 AM    O6QBAFKM 95.0 2022 10:30 AM    VVA5RKN 32.7 2022 10:30 AM    PBEA 1 2022 04:43 AM       VITALS  Heart Rate: 77   Resp: 18  BP: 127/62  SpO2: 94 % O2 Device: Nasal cannula  Temp: 97.6 °F (36.4 °C)    SKIN COLOR  [x] Normal  [] Pale  [] Dusky  [] Cyanotic    RESPIRATORY PATTERN  [x] Normal  [] Dyspnea  [] Cheyne-Granger  [] Kussmaul  [] Biots    AMBULATORY  [] Yes  [] No  [x] With Assistance      Patient Acuity 0 1 2 3 4 Score   Level of Concious (LOC) [x]  Alert & Oriented or Pt normal LOC []  Confused;follows directions []  Confused & PROTOCOL    Total Acuity:   16-32  []  Secondary Assessment in 24 hrs Total Acuity:  9-15  [x]  Secondary Assessment in 24 hrs Total Acuity:  4-8  []  Secondary Assessment in 48 hrs Total Acuity:  0-3  []  Secondary Assessment in 72 hrs   HHN AEROSOL THERAPY with  [physician-ordered bronchodilator(s)] q 4 & Albuterol PRN q2 hrs. Breath-Actuated Neb if BBS Acuity = 4, and pt. can use MP. Notify physician if condition deteriorates. HHN AEROSOL THERAPY with  [physician-ordered bronchodilator(s)]  QID and Albuterol PRN q4 hrs. Breath-Actuated Neb if BBS Acuity = 4, and pt. can use MP. Notify physician if condition deteriorates. MDI THERAPY with  2 actuations of [physician-ordered bronchodilator(s)] via spacer TID Albuterol and PRNq4 hrs. If unable to utilize MDI: HHN [physician-ordered bronchodilator(s)] TID and Albuterol PRN q4 hrs. Notify physician if condition deteriorates. MDI THERAPY with  [physician-ordered bronchodilator(s)] via spacer TID PRN. If unable to utilize MDI: HHN [physician-ordered bronchodilator(s)] TID PRN. Notify physician if condition deteriorates. If Acuity Level is 2, 3, or 4 in any of the following:    [] COUGH     [] SURGICAL HISTORY (SURG HX)  [] CHEST XRAY (CXR)    Goal: Improvement in sputum mobilization in patients with ineffective airway clearance. Reverse atelectasis.     [x] Bronchopulmonary Hygiene Protocol    Total Acuity:   16-32  []  Secondary Assessment in 24 hrs Total Acuity:  9-15  [x]  Secondary Assessment in 24 hrs Total Acuity:  4-8  []  Secondary Assessment in 48 hrs Total Acuity:  0-3  []  Secondary Assessment in 72 hrs   METANEB QID with [physician-ordered bronchodilator(s)] if CXR Acuity = 4; otherwise:  PD&P, PEP, or Vest QID & PRN  NT Sxn PRN for ineffective cough  METANEB QID with [physician-ordered bronchodilator(s)] if CXR Acuity = 4; otherwise:  PD&P, PEP, or Vest TID & PRN  NT Sxn PRN for ineffective cough  Instruct patient to self-perform IS q1hr WA   Directed Cough self-performed q1hr WA     If Acuity Level is 2 or above in the following:    [] PULMONARY HISTORY (PULM HX)    Goal: Assist patient in quitting smoking to slow or stop the progression of lung disease.     [] Smoking Cessation Protocol    SMOKING CESSATION EDUCATION provided according to policy [id number]: (marlyn with an X)  ____Yes    ____ No     ____ NA    Smoking Cessation Booklet given:  ____Yes  ____No ____Patient Di Aschoff

## 2022-10-09 NOTE — PROGRESS NOTES
Occupational Therapy  Facility/Department: [facility] AT THE [facility]  Occupational Therapy Initial Assessment    Name: Brayan Lopez  : 1945  MRN: [id number]  Date of Service: 10/9/2022    Discharge Recommendations:  Continue to assess pending progress, Subacute/Skilled Nursing Facility, Home with Home health OT          Patient Diagnosis(es): The primary encounter diagnosis was Pneumonia due to infectious organism, unspecified laterality, unspecified part of lung. Diagnoses of CO2 retention and Confusion were also pertinent to this visit. Past Medical History:  has a past medical history of A-fib ([address] Biventricular congestive heart failure ([address] Bronchiectasis with acute exacerbation ([address] CAD (coronary artery disease), Chronic pain syndrome, COPD (chronic obstructive pulmonary disease) ([address] Depression, GERD (gastroesophageal reflux disease), Hypothyroidism, Impaired fasting glucose, Iron deficiency anemia, Osteoporosis, Pulmonary fibrosis ([address] and Subdural hematoma ([address] Past Surgical History:  has a past surgical history that includes Hysterectomy (1991); Carpal tunnel release (Right, 1999); Total knee arthroplasty (Right, 2021); fracture surgery (Left, 2018); Wrist fracture surgery (Left, 2018); lumbar discectomy (1993); Lumbar disc surgery (02/15/1994); back surgery (1998); back surgery (1999); Carpal tunnel release (Left, 1999); Neck surgery (2002); Carpal tunnel release (Right, 2002); Carpal tunnel release (Left, 2003); back surgery (10/23/2003); back surgery (10/23/2003); Cervical disc surgery (10/25/2004); Cervical disc surgery (2004); Neck surgery (2005); Toe amputation (10/08/2006); Toe amputation (2008); lumbar laminectomy (2008); Toe amputation (10/03/2008); Humerus fracture surgery (Right, 10/03/2010); Knee arthroscopy (Right, 2011); back surgery (2017); knee surgery (Right, 2016);  Wrist fracture surgery (Left, 12/20/2018); Wrist surgery (Left, 07/02/2019); and craniotomy (Left, 8/23/2022). Treatment Diagnosis: M62.81-Generalized Weakness      Assessment   Performance deficits / Impairments: Decreased functional mobility ; Decreased endurance;Decreased ADL status; Decreased strength  Assessment: Patient is a 68 y/o female admitted due to sepsis and pneumonia. Pt presents with decreased strength, endurance, balance, and safety awareness affecting her ability to safely perform ADL tasks. Pt would benefit from skilled OT services to address these deficits and return to PLOF. Treatment Diagnosis: M62.81-Generalized Weakness  Prognosis: Fair  Decision Making: Medium Complexity  REQUIRES OT FOLLOW-UP: Yes  Activity Tolerance  Activity Tolerance: Patient limited by fatigue        Plan   Occupational Therapy Plan  Times Per Week: 7  Times Per Day: Once a day  Current Treatment Recommendations: Strengthening, Functional mobility training, Self-Care / ADL, Safety education & training, Endurance training     Restrictions  Restrictions/Precautions  Restrictions/Precautions: General Precautions  Required Braces or Orthoses?: No    Subjective   General  Chart Reviewed: Yes  Patient assessed for rehabilitation services?: Yes  Family / Caregiver Present: Yes  Referring Practitioner: Dr. Anh Carnes  Diagnosis: Sepsis/Pneumonia  Subjective  Subjective: Patient reports generalized pain in legs and low back, 4/10 at this time. PAIN LEVEL 0/10.   Social/Functional History  Social/Functional History  Lives With: Spouse  Type of Home: House  Home Layout: Two level, Able to Live on Main level with bedroom/bathroom  Home Access: Stairs to enter with rails  Entrance Stairs - Number of Steps: 3  Entrance Stairs - Rails: Both  Bathroom Shower/Tub: Tub/Shower unit  Bathroom Equipment: Shower chair, Grab bars in shower, Grab bars around toilet, Toilet raiser  Home Equipment: [place] Commander, standard, Walker, rolling, [place], [employer]  Has the patient had two or more falls in the past year or any fall with injury in the past year?: Yes  Receives Help From: Personal care attendant (reports RN comes in twice a week and she has PT/OT coming in when at home)  ADL Assistance: Independent  Homemaking Assistance: Needs assistance  Additional Comments: reports independence wtih ADLS, typically only does light meal prep       Objective   Heart Rate: 81  Heart Rate Source: Apical;Monitor  BP: 118/71  BP Location: Right upper arm  BP Method: Automatic  Patient Position: Semi fowlers  MAP (Calculated): 86.67  Resp: 19  SpO2: 90 %  O2 Device: Nasal cannula          Observation/Palpation  Posture: Fair  Safety Devices  Type of Devices: Call light within reach; Left in chair;Nurse notified  Restraints  Restraints Initially in Place: No  Bed Mobility Training  Bed Mobility Training: No  Overall Level of Assistance: Moderate assistance  Interventions: Demonstration  Rolling: Moderate assistance  Supine to Sit: Moderate assistance  Sit to Supine: Moderate assistance  Scooting:  Moderate assistance  Balance  Sitting: Intact  Standing: Impaired  Standing - Static: Fair  Standing - Dynamic: Fair  Transfer Training  Transfer Training: Yes  Overall Level of Assistance: Contact-guard assistance  Interventions: Demonstration  Sit to Stand: Contact-guard assistance  Stand to Sit: Contact-guard assistance  Stand Pivot Transfers: Contact-guard assistance  Bed to Chair: Contact-guard assistance  Gait Training  Gait Training: Yes  Right Side Weight Bearing: As tolerated  Left Side Weight Bearing: As tolerated  Gait  Overall Level of Assistance: Contact-guard assistance  Interventions: Demonstration  Base of Support: Widened  Distance (ft): 10 Feet  Assistive Device: Walker, rolling        ADL  Feeding: Modified independent ;Setup  Grooming: Setup;Supervision  UE Bathing: Stand by assistance;Setup  LE Bathing: Minimal assistance  UE Dressing: Stand by assistance;Setup  LE Dressing: Contact guard assistance  Toileting: Contact guard assistance     Activity Tolerance  Activity Tolerance: Patient tolerated treatment well        Vision  Vision: Impaired  Vision Exceptions: Wears glasses for reading  Hearing  Hearing: Within functional limits  Cognition  Overall Cognitive Status: WNL  Orientation  Overall Orientation Status: Within Normal Limits                  Education Given To: Patient; Family  Education Provided: Role of Therapy;Plan of Care  Education Method: Verbal  Barriers to Learning: None  Education Outcome: Verbalized understanding  LUE AROM (degrees)  LUE AROM : WFL  Left Hand AROM (degrees)  Left Hand AROM: WFL  RUE AROM (degrees)  RUE AROM : WFL  Right Hand AROM (degrees)  Right Hand AROM: [facility]        AM-PAC Score        AM-[facility] Inpatient Daily Activity Raw Score: 19 (10/09/22 1044)  AM-PAC Inpatient ADL T-Scale Score : 40.22 (10/09/22 1044)  ADL Inpatient CMS 0-100% Score: 42.8 (10/09/22 1044)  ADL Inpatient CMS G-Code Modifier : CK (10/09/22 1044)      Goals  Short Term Goals  Time Frame for Short Term Goals: 20 visits  Short Term Goal 1: Patient to tolerate 15 minutes ther-ex/ther-act to increase ease of ADL participation. Short Term Goal 2: Patient to complete standing sinkside ADLs with SBA. Short Term Goal 3: Patient to complete LB bathing/dressing with SBA. Short Term Goal 4: Pt will be educated on EC strategies and breathing techniques to decrease fatigue and improve participation in ADL's.        Therapy Time   Individual Concurrent Group Co-treatment   Time In [facility] 26         Time Out 0934         Minutes [address], OTR/L

## 2022-10-09 NOTE — H&P
Nicolas Avery M.D. Internal Medicine ICU Admission Note    Patient: Paras Oliva  Date of Admission: 10/8/2022 10:06 AM  Date of Evaluation: 10/9/2022        Subjective:      Chief Complaint:    Chief Complaint   Patient presents with    Shortness of Breath       History Obtained From:  patient, electronic medical record  PCP: Monica Hernandez MD    History of Present Illness:   Paras Oliva is a 69 y.o. female who presented to the ER yesterday complaining of worsening SOB and decreased responsiveness . She was unable to give any history in the ER but is more awake and alert this AM.  She was already on CPAP upon arrival to ER. She was tachycardic and hypoxic, requiring CPAP for respiratory support. She had leukocytosis with left shift, however she was afebrile. Lactic acid was normal.  She was hypotensive in ER with BP down to 83/46 and she was tx'd with 30 mL/kg IVF bolus and her BP responded nicely. She did not require vasopressors. This morning she is awake and alert and sitting up in chair. She states she doesn't even remember being brought in to the ER yesterday. She c/o cough and SOB today which is \"a little worse than normal\". She denies chest pain. She denies fever or chills. She wore BiPAP all night and was switched to NC O2 this AM.       Review of Systems:  Constitutional:negative  for fevers, and negative for chills.   Respiratory: positive for shortness of breath, positive for cough, and positive for wheezing  Cardiovascular: negative for chest pain, negative for palpitations, and negative for syncope  Gastrointestinal: negative for abdominal pain, negative for nausea,negative for vomiting, negative for diarrhea, negative for constipation, and negative for hematochezia or melena  Genitourinary: negative for dysuria, negative for urinary urgency, negative for urinary frequency, and negative for hematuria  Neurological: negative for unilateral weakness, numbness or tingling.     All other systems were reviewed with the patient and are negative except as stated      History:      Past Medical History:        Diagnosis Date    A-fib ([address]     Biventricular congestive heart failure (HCC)     Bronchiectasis with acute exacerbation ([address] 04/29/2022    CAD (coronary artery disease)     Chronic pain syndrome     dilaudid infusion pump    COPD (chronic obstructive pulmonary disease) (HCC)     Depression     GERD (gastroesophageal reflux disease)     Hypothyroidism     Impaired fasting glucose     Iron deficiency anemia     Osteoporosis     Pulmonary fibrosis ([address] 04/29/2022    Subdural hematoma ([address] 08/23/2022       Past Surgical History:        Procedure Laterality Date    BACK SURGERY  09/09/1998    spinal cord stimulator    BACK SURGERY  08/04/1999    infusion pump insertion    BACK SURGERY  10/23/2003    spinal cord stimulator removed    BACK SURGERY  10/23/2003    new infusion pump placed    BACK SURGERY  09/11/2017    replaced infusion pump    CARPAL TUNNEL RELEASE Right 12/17/1999    CARPAL TUNNEL RELEASE Left 11/24/1999    CARPAL TUNNEL RELEASE Right 12/30/2002    CARPAL TUNNEL RELEASE Left 12/17/2003    CERVICAL DISC SURGERY  10/25/2004    anterior cervical diskectomy C7-T1    CERVICAL DISC SURGERY  12/22/2004    anterior cervical discectomy K9-8 (complicated by damaged nerve to vocal cord)    CRANIOTOMY Left 8/23/2022    LEFT CRANIOTOMY FOR SUBDURAL HEMATOMA EVACUATION performed by Emery Zhao DO at [address] [place] [address] Left 12/20/2018    ORIF wrist    HUMERUS FRACTURE SURGERY Right 10/03/2010    HYSTERECTOMY (CERVIX STATUS UNKNOWN)  08/20/1991    KNEE ARTHROSCOPY Right 06/02/2011    KNEE SURGERY Right 02/04/2016    repair distal portion of knee arthroplasty due to prosthesis loosening    LUMBAR DISC SURGERY  02/15/1994    due to scar tissue from previous surgery    LUMBAR DISCECTOMY  08/30/1993    L5-S1    LUMBAR LAMINECTOMY  06/09/2008    L3-4-5    NECK SURGERY 05/03/2002    posterior plate fusion    NECK SURGERY  12/09/2005    reconnect nerve to vocal cord [facility]    TOE AMPUTATION  10/08/2006    left 3rd toe - osteomyelitis    TOE AMPUTATION  03/07/2008    left 4th toe partial amputation    TOE AMPUTATION  10/03/2008    left 2nd toe    TOTAL KNEE ARTHROPLASTY Right 03/19/2021    WRIST FRACTURE SURGERY Left 12/20/2018    WRIST OPEN REDUCTION INTERNAL FIXATION-DISTAL RADIUS performed by Robin Restrepo MD at [facility] Left 12/20/2018    WRIST SURGERY Left 07/02/2019    left wrist ulnar shortening osteotomy       Medications Prior to Admission:    Prior to Admission medications    Medication Sig Start Date End Date Taking? Authorizing Provider   HYDROcodone-acetaminophen (NORCO)  MG per tablet Take 1 tablet by mouth every 8 hours as needed for Pain. Yes Historical Provider, MD   pregabalin (LYRICA) 300 MG capsule Take 300 mg by mouth 2 times daily.     Historical Provider, MD   ondansetron (ZOFRAN-ODT) 4 MG disintegrating tablet Take 4 mg by mouth every 8 hours as needed for Nausea or Vomiting    Historical Provider, MD   amLODIPine (NORVASC) 10 MG tablet Take 1 tablet by mouth daily 8/31/22   RYAN Bailon CNP   benzocaine-menthol (CEPACOL SORE THROAT) 15-3.6 MG lozenge Take 1 lozenge by mouth every 2 hours as needed for Sore Throat  Patient not taking: No sig reported 8/30/22   RYAN Bailon CNP   levETIRAcetam (KEPPRA) 100 MG/ML solution Take 10 mLs by mouth 2 times daily 8/30/22   RYAN Bailon CNP   ibuprofen (ADVIL;MOTRIN) 400 MG tablet Take 1 tablet by mouth every 6 hours as needed for Pain 8/30/22   RYAN Bailon CNP   DULoxetine (CYMBALTA) 60 MG extended release capsule Take 60 mg by mouth daily    Historical Provider, MD   traZODone (DESYREL) 100 MG tablet Take 200 mg by mouth nightly    Historical Provider, MD   hydroxychloroquine (PLAQUENIL) 200 MG tablet Take 300 mg by mouth daily Heart Disease Brother            Objective:    VITAL SIGNS:  Patient Vitals for the past 8 hrs:   BP Temp Temp src Pulse Resp SpO2 Weight   10/09/22 1003 118/71 -- -- 81 19 90 % --   10/09/22 0930 (!) 116/52 -- -- 89 25 90 % --   10/09/22 0900 (!) 110/53 -- -- 71 15 93 % --   10/09/22 0830 (!) 107/49 -- -- 83 18 96 % --   10/09/22 0730 (!) 107/48 97 °F (36.1 °C) Temporal 59 16 94 % 165 lb 5.5 oz (75 kg)   10/09/22 06 (!) 95/44 -- -- 60 17 (!) 89 % --   10/09/22 0615 (!) 89/37 -- -- 62 19 93 % --   10/09/22 0600 (!) 117/46 -- -- 77 14 94 % --   10/09/22 0545 (!) 118/50 -- -- 72 17 97 % --   10/09/22 0530 (!) 133/59 -- -- 68 16 100 % --   10/09/22 0521 -- -- -- -- 16 -- --   10/09/22 0515 121/60 -- -- 79 15 91 % --   10/09/22 0445 (!) 105/50 -- -- 56 14 92 % --   10/09/22 0430 (!) 107/51 -- -- 57 12 94 % --   10/09/22 0420 -- -- -- 57 -- -- --   10/09/22 0415 (!) 110/50 -- -- 58 15 93 % --   10/09/22 0400 (!) 106/49 -- -- 59 17 94 % --   10/09/22 0345 (!) 97/43 -- -- 54 13 94 % --         Temp: 97 °F (36.1 °C)  Temp range:    Temp  Av.7 °F (36.5 °C)  Min: 97 °F (36.1 °C)  Max: 98.6 °F (37 °C)    BP: 118/71  BP Range:      Systolic (46BDX), HKM:417 , Min:83 , YQX:210      Diastolic (17MKS), SGX:02, Min:37, Max:78    Heart Rate: 81  Pulse Range:    Pulse  Av.2  Min: 52  Max: 91    Resp: 19  Resp Range:   Resp  Avg: 15.1  Min: 0  Max: 25    SpO2: 90 % on supplemental O2  SpO2 range:   SpO2  Av.3 %  Min: 89 %  Max: 100 %    Weight  Wt Readings from Last 3 Encounters:   10/09/22 165 lb 5.5 oz (75 kg)   22 143 lb (64.9 kg)   22 143 lb (64.9 kg)     Body mass index is 27.51 kg/m².    -----------------------------------------------------------------  EXAM:  GEN:    Awake, alert and oriented x3. EYES:  EOMI, pupils equal   NECK: Supple. No lymphadenopathy.   No carotid bruit  CVS:    regular rate and rhythm, no audible murmur  PULM:  diminished with bilateral expiratory wheezing and scattered rhonchi worse in the bases, mild acute respiratory distress  ABD:    Bowels sounds normal.  Abdomen is soft. No distention. no tenderness to palpation. EXT:   1+ edema bilaterally . No calf tenderness. NEURO: Moves all extremities. Motor and sensory are grossly intact  SKIN:  No rashes. No skin lesions.         Diagnostic Data:    Complete Blood Count:   Lab Results   Component Value Date    WBC 6.2 10/09/2022    RBC 3.39 (L) 10/09/2022    HGB 10.4 (L) 10/09/2022    HCT 32.5 (L) 10/09/2022    MCV 95.9 10/09/2022    RDW 13.5 10/09/2022     10/09/2022      Lab Results   Component Value Date    SEGS 71 (H) 10/09/2022    NEUTROABS 4.37 10/09/2022    LYMPHOPCT 15 (L) 10/09/2022    LYMPHSABS 0.94 (L) 10/09/2022    MONOPCT 11 10/09/2022    EOSRELPCT 3 10/09/2022    BASOPCT 1 10/09/2022    IMMGRAN 0 10/09/2022       CMP:   Lab Results   Component Value Date    GLUCOSE 96 10/09/2022    BUN 10 10/09/2022    CREATININE 0.53 10/09/2022     10/09/2022    K 3.8 10/09/2022    CALCIUM 8.5 (L) 10/09/2022     10/09/2022    CO2 31 10/09/2022    PROT 5.4 (L) 10/09/2022    LABALBU 2.9 (L) 10/09/2022    BILITOT 0.4 10/09/2022    ALKPHOS 72 10/09/2022    ALT 7 10/09/2022    AST 10 10/09/2022       UA:   Lab Results   Component Value Date    COLORU Yellow 10/08/2022    SPECGRAV 1.015 10/08/2022    WBCUA 0 TO 2 09/09/2022    LEUKOCYTESUR NEGATIVE 10/08/2022    GLUCOSEU NEGATIVE 10/08/2022    KETUA NEGATIVE 10/08/2022    PROTEINU NEGATIVE 10/08/2022    HGBUR NEGATIVE 10/08/2022    BACTERIA 1+ (A) 09/09/2022       Lactic Acid:    Latest Reference Range & Units 10/8/22 11:01 10/8/22 12:00 10/8/22 14:25 10/8/22 16:38   Lactic Acid, Sepsis 0.5 - 1.9 mmol/L 1.2 1.2 1.6 1.3       PT/INR:  Lab Results   Component Value Date/Time    PROTIME 13.0 10/08/2022 11:01 AM    INR 1.0 10/08/2022 11:01 AM     High Sensitivity Troponin:  Recent Labs     10/08/22  1101   TROPHS 12     Pro-BNP:  Lab Results   Component Value Date PROBNP 117 10/08/2022      Latest Reference Range & Units 10/8/22 10:45   pH, Micheal 7.32 - 7.42  7.362   pCO2, Micheal 39 - 55 mm Hg 60.7 (HH)   pO2, Micheal 30.0 - 50.0 mm Hg 42.5   HCO3, Venous 24.0 - 30.0 mmol/L 33.7 (H)   Positive Base Excess, Micheal 0.0 - 2.0 mmol/L 6.1 (H)   O2 Sat, Micheal 60.0 - 85.0 % 75.1       EKG reviewed: my interpretation is: Sinus tachycardia, normal axis, normal intervals, non-specific ST changes        Imaging Data:  CT Head W/O Contrast   Final Result   No acute process. No significant change in primarily low-density left   convexity extra-axial collection. XR CHEST PORTABLE   Final Result   Suspected bilateral infectious/inflammatory airways process. Assessment and Plan: This patient requires inpatient ICU admission because of Sepsis due to pneumonia ([address]  Estimated length of stay is 4 days  Discussed patient's symptoms and data results including labs and imaging studies with the ER MD at time of admission  IV Zosyn / Levofloxacin  Received 30 mL/kg IVF bolus in ER  Acute on chronic respiratory failure with hypoxia and hypercapnia  Monitor ABG  BiPAP if needed for hypercapnia  COPD / Pulmonary fibrosis  Continue Anoro Ellipta, Duonebs  Hypertension  Continue Amldodipine   Chronic pain syndrome  Continue Norco   Nutrition status: at risk for malnutrition due to current illness  DVT prophylaxis: Lovenox   High risk medications: none   Total critical care time caring for this patient with life threatening, unstable organ failure, including direct patient contact, management of life support systems, review of data including imaging and labs, discussions with other team members and physicians at least 30 minutes so far today, excluding separately billable procedures. CORE MEASURES  DVT prophylaxis: Lovenox  Decubitus ulcer present on admission: No  CODE STATUS: FULL CODE  Nutrition Status: fair   Physical therapy: Yes   Old Charts reviewed: Yes   EKG Reviewed:  Yes Advance Directive Addressed: Yes - in chart    Lila Bhatia MD , M.D.  10/9/2022  11:31 AM

## 2022-10-09 NOTE — PROGRESS NOTES
Patient resting in bed with eyes closed. BiPap on, no leaks detected. Awoke for assessment, no complaints or needs voiced. Will continue to monitor and assess as needed.

## 2022-10-09 NOTE — PROGRESS NOTES
Patient resting in bed with eyes closed. Aroused easily for assessment. VS stable, call light within reach. Will continue to assess as needed.

## 2022-10-10 LAB
ABSOLUTE EOS #: 0.23 K/UL (ref 0–0.44)
ABSOLUTE IMMATURE GRANULOCYTE: <0.03 K/UL (ref 0–0.3)
ABSOLUTE LYMPH #: 0.83 K/UL (ref 1.1–3.7)
ABSOLUTE MONO #: 0.47 K/UL (ref 0.1–1.2)
ALBUMIN SERPL-MCNC: 3.1 G/DL (ref 3.5–5.2)
ALBUMIN/GLOBULIN RATIO: 1.2 (ref 1–2.5)
ALLEN TEST: ABNORMAL
ALP BLD-CCNC: 68 U/L (ref 35–104)
ALT SERPL-CCNC: 8 U/L (ref 5–33)
ANION GAP SERPL CALCULATED.3IONS-SCNC: 7 MMOL/L (ref 9–17)
AST SERPL-CCNC: 10 U/L
BASOPHILS # BLD: 1 % (ref 0–2)
BASOPHILS ABSOLUTE: <0.03 K/UL (ref 0–0.2)
BILIRUB SERPL-MCNC: 0.2 MG/DL (ref 0.3–1.2)
BUN BLDV-MCNC: 7 MG/DL (ref 8–23)
BUN/CREAT BLD: 15 (ref 9–20)
CALCIUM SERPL-MCNC: 8.7 MG/DL (ref 8.6–10.4)
CHLORIDE BLD-SCNC: 107 MMOL/L (ref 98–107)
CO2: 29 MMOL/L (ref 20–31)
CREAT SERPL-MCNC: 0.46 MG/DL (ref 0.5–0.9)
EOSINOPHILS RELATIVE PERCENT: 6 % (ref 1–4)
FIO2: 28
GFR SERPL CREATININE-BSD FRML MDRD: >60 ML/MIN/1.73M2
GLUCOSE BLD-MCNC: 94 MG/DL (ref 70–99)
HCO3 ARTERIAL: 30.5 MMOL/L (ref 22–26)
HCT VFR BLD CALC: 31.8 % (ref 36.3–47.1)
HEMOGLOBIN: 10.1 G/DL (ref 11.9–15.1)
IMMATURE GRANULOCYTES: 0 %
LYMPHOCYTES # BLD: 23 % (ref 24–43)
MCH RBC QN AUTO: 30.8 PG (ref 25.2–33.5)
MCHC RBC AUTO-ENTMCNC: 31.8 G/DL (ref 28.4–34.8)
MCV RBC AUTO: 97 FL (ref 82.6–102.9)
MONOCYTES # BLD: 13 % (ref 3–12)
NRBC AUTOMATED: 0 PER 100 WBC
O2 DEVICE/FLOW/%: ABNORMAL
O2 SAT, ARTERIAL: 96.4 % (ref 95–98)
PATIENT TEMP: 37
PCO2 ARTERIAL: 58.9 MMHG (ref 35–45)
PDW BLD-RTO: 13.6 % (ref 11.8–14.4)
PH ARTERIAL: 7.33 (ref 7.35–7.45)
PLATELET # BLD: 178 K/UL (ref 138–453)
PMV BLD AUTO: 10.3 FL (ref 8.1–13.5)
PO2 ARTERIAL: 92.5 MMHG (ref 80–100)
POSITIVE BASE EXCESS, ART: 2.9 MMOL/L (ref 0–2)
POTASSIUM SERPL-SCNC: 3.7 MMOL/L (ref 3.7–5.3)
PT. POSITION: ABNORMAL
RBC # BLD: 3.28 M/UL (ref 3.95–5.11)
SAMPLE SITE: ABNORMAL
SEG NEUTROPHILS: 57 % (ref 36–65)
SEGMENTED NEUTROPHILS ABSOLUTE COUNT: 2.1 K/UL (ref 1.5–8.1)
SODIUM BLD-SCNC: 143 MMOL/L (ref 135–144)
TOTAL PROTEIN: 5.7 G/DL (ref 6.4–8.3)
WBC # BLD: 3.7 K/UL (ref 3.5–11.3)

## 2022-10-10 PROCEDURE — 97110 THERAPEUTIC EXERCISES: CPT

## 2022-10-10 PROCEDURE — 36600 WITHDRAWAL OF ARTERIAL BLOOD: CPT

## 2022-10-10 PROCEDURE — 82805 BLOOD GASES W/O2 SATURATION: CPT

## 2022-10-10 PROCEDURE — 94761 N-INVAS EAR/PLS OXIMETRY MLT: CPT

## 2022-10-10 PROCEDURE — 36415 COLL VENOUS BLD VENIPUNCTURE: CPT

## 2022-10-10 PROCEDURE — 6370000000 HC RX 637 (ALT 250 FOR IP): Performed by: NURSE PRACTITIONER

## 2022-10-10 PROCEDURE — 94664 DEMO&/EVAL PT USE INHALER: CPT

## 2022-10-10 PROCEDURE — 6370000000 HC RX 637 (ALT 250 FOR IP): Performed by: INTERNAL MEDICINE

## 2022-10-10 PROCEDURE — 94640 AIRWAY INHALATION TREATMENT: CPT

## 2022-10-10 PROCEDURE — 97530 THERAPEUTIC ACTIVITIES: CPT

## 2022-10-10 PROCEDURE — 2580000003 HC RX 258: Performed by: NURSE PRACTITIONER

## 2022-10-10 PROCEDURE — 80053 COMPREHEN METABOLIC PANEL: CPT

## 2022-10-10 PROCEDURE — 2700000000 HC OXYGEN THERAPY PER DAY

## 2022-10-10 PROCEDURE — 94669 MECHANICAL CHEST WALL OSCILL: CPT

## 2022-10-10 PROCEDURE — 6360000002 HC RX W HCPCS: Performed by: INTERNAL MEDICINE

## 2022-10-10 PROCEDURE — 2580000003 HC RX 258: Performed by: INTERNAL MEDICINE

## 2022-10-10 PROCEDURE — 85025 COMPLETE CBC W/AUTO DIFF WBC: CPT

## 2022-10-10 PROCEDURE — 2000000000 HC ICU R&B

## 2022-10-10 RX ORDER — GUAIFENESIN/DEXTROMETHORPHAN 100-10MG/5
5 SYRUP ORAL EVERY 4 HOURS PRN
Status: DISCONTINUED | OUTPATIENT
Start: 2022-10-10 | End: 2022-10-18 | Stop reason: HOSPADM

## 2022-10-10 RX ADMIN — POTASSIUM BICARBONATE 20 MEQ: 782 TABLET, EFFERVESCENT ORAL at 08:45

## 2022-10-10 RX ADMIN — HYDROXYCHLOROQUINE SULFATE 300 MG: 200 TABLET ORAL at 08:28

## 2022-10-10 RX ADMIN — SODIUM CHLORIDE: 9 INJECTION, SOLUTION INTRAVENOUS at 18:57

## 2022-10-10 RX ADMIN — PIPERACILLIN AND TAZOBACTAM 4500 MG: 4; .5 INJECTION, POWDER, LYOPHILIZED, FOR SOLUTION INTRAVENOUS at 12:43

## 2022-10-10 RX ADMIN — ONDANSETRON 4 MG: 4 TABLET, ORALLY DISINTEGRATING ORAL at 18:54

## 2022-10-10 RX ADMIN — HYDROCODONE BITARTRATE AND ACETAMINOPHEN 1 TABLET: 10; 325 TABLET ORAL at 06:04

## 2022-10-10 RX ADMIN — LEVOFLOXACIN 750 MG: 5 INJECTION, SOLUTION INTRAVENOUS at 13:03

## 2022-10-10 RX ADMIN — FAMOTIDINE 20 MG: 20 TABLET ORAL at 08:26

## 2022-10-10 RX ADMIN — IPRATROPIUM BROMIDE AND ALBUTEROL SULFATE 3 ML: 2.5; .5 SOLUTION RESPIRATORY (INHALATION) at 20:13

## 2022-10-10 RX ADMIN — Medication 1000 MG: at 08:28

## 2022-10-10 RX ADMIN — POTASSIUM BICARBONATE 20 MEQ: 782 TABLET, EFFERVESCENT ORAL at 20:51

## 2022-10-10 RX ADMIN — HYDROCODONE BITARTRATE AND ACETAMINOPHEN 1 TABLET: 10; 325 TABLET ORAL at 14:09

## 2022-10-10 RX ADMIN — HYDROCODONE BITARTRATE AND ACETAMINOPHEN 1 TABLET: 10; 325 TABLET ORAL at 20:51

## 2022-10-10 RX ADMIN — LEVOTHYROXINE SODIUM 200 MCG: 100 TABLET ORAL at 07:37

## 2022-10-10 RX ADMIN — IPRATROPIUM BROMIDE AND ALBUTEROL SULFATE 3 ML: 2.5; .5 SOLUTION RESPIRATORY (INHALATION) at 10:08

## 2022-10-10 RX ADMIN — Medication 1000 MG: at 20:51

## 2022-10-10 RX ADMIN — SODIUM CHLORIDE: 9 INJECTION, SOLUTION INTRAVENOUS at 07:40

## 2022-10-10 RX ADMIN — TIOTROPIUM BROMIDE AND OLODATEROL 2 PUFF: 3.124; 2.736 SPRAY, METERED RESPIRATORY (INHALATION) at 08:30

## 2022-10-10 RX ADMIN — DULOXETINE HYDROCHLORIDE 60 MG: 60 CAPSULE, DELAYED RELEASE ORAL at 08:26

## 2022-10-10 RX ADMIN — TRAZODONE HYDROCHLORIDE 200 MG: 50 TABLET ORAL at 20:51

## 2022-10-10 RX ADMIN — SODIUM CHLORIDE, PRESERVATIVE FREE 10 ML: 5 INJECTION INTRAVENOUS at 20:53

## 2022-10-10 RX ADMIN — GUAIFENESIN AND DEXTROMETHORPHAN 5 ML: 100; 10 SYRUP ORAL at 13:17

## 2022-10-10 RX ADMIN — IPRATROPIUM BROMIDE AND ALBUTEROL SULFATE 3 ML: 2.5; .5 SOLUTION RESPIRATORY (INHALATION) at 05:31

## 2022-10-10 RX ADMIN — SERTRALINE HYDROCHLORIDE 50 MG: 50 TABLET ORAL at 08:26

## 2022-10-10 RX ADMIN — PREGABALIN 300 MG: 75 CAPSULE ORAL at 08:26

## 2022-10-10 RX ADMIN — PIPERACILLIN AND TAZOBACTAM 4500 MG: 4; .5 INJECTION, POWDER, LYOPHILIZED, FOR SOLUTION INTRAVENOUS at 20:53

## 2022-10-10 RX ADMIN — SODIUM CHLORIDE: 9 INJECTION, SOLUTION INTRAVENOUS at 00:42

## 2022-10-10 RX ADMIN — PIPERACILLIN AND TAZOBACTAM 4500 MG: 4; .5 INJECTION, POWDER, LYOPHILIZED, FOR SOLUTION INTRAVENOUS at 03:47

## 2022-10-10 RX ADMIN — IPRATROPIUM BROMIDE AND ALBUTEROL SULFATE 3 ML: 2.5; .5 SOLUTION RESPIRATORY (INHALATION) at 15:34

## 2022-10-10 RX ADMIN — PREGABALIN 300 MG: 75 CAPSULE ORAL at 20:51

## 2022-10-10 RX ADMIN — ENOXAPARIN SODIUM 40 MG: 100 INJECTION SUBCUTANEOUS at 08:28

## 2022-10-10 RX ADMIN — PANTOPRAZOLE SODIUM 40 MG: 40 TABLET, DELAYED RELEASE ORAL at 07:37

## 2022-10-10 RX ADMIN — FAMOTIDINE 20 MG: 20 TABLET ORAL at 20:51

## 2022-10-10 ASSESSMENT — PAIN DESCRIPTION - PAIN TYPE
TYPE: CHRONIC PAIN

## 2022-10-10 ASSESSMENT — PAIN SCALES - GENERAL
PAINLEVEL_OUTOF10: 6
PAINLEVEL_OUTOF10: 8
PAINLEVEL_OUTOF10: 7
PAINLEVEL_OUTOF10: 4
PAINLEVEL_OUTOF10: 9
PAINLEVEL_OUTOF10: 4
PAINLEVEL_OUTOF10: 0
PAINLEVEL_OUTOF10: 5
PAINLEVEL_OUTOF10: 9

## 2022-10-10 ASSESSMENT — PAIN DESCRIPTION - LOCATION
LOCATION: BACK

## 2022-10-10 ASSESSMENT — PAIN DESCRIPTION - DESCRIPTORS
DESCRIPTORS: ACHING;SHARP
DESCRIPTORS: ACHING

## 2022-10-10 ASSESSMENT — PAIN - FUNCTIONAL ASSESSMENT
PAIN_FUNCTIONAL_ASSESSMENT: ACTIVITIES ARE NOT PREVENTED

## 2022-10-10 ASSESSMENT — PAIN SCALES - WONG BAKER
WONGBAKER_NUMERICALRESPONSE: 0
WONGBAKER_NUMERICALRESPONSE: 2
WONGBAKER_NUMERICALRESPONSE: 0
WONGBAKER_NUMERICALRESPONSE: 2
WONGBAKER_NUMERICALRESPONSE: 0
WONGBAKER_NUMERICALRESPONSE: 2
WONGBAKER_NUMERICALRESPONSE: 0
WONGBAKER_NUMERICALRESPONSE: 0

## 2022-10-10 ASSESSMENT — PAIN DESCRIPTION - ORIENTATION
ORIENTATION: LOWER

## 2022-10-10 ASSESSMENT — PAIN DESCRIPTION - FREQUENCY
FREQUENCY: CONTINUOUS

## 2022-10-10 ASSESSMENT — PAIN DESCRIPTION - ONSET
ONSET: ON-GOING

## 2022-10-10 ASSESSMENT — PULMONARY FUNCTION TESTS: PEFR_L/MIN: 16

## 2022-10-10 NOTE — RT PROTOCOL NOTE
RESPIRATORY ASSESSMENT PROTOCOL                                                                                              Patient Name: Tiffanie Rasheed Room#: E073/S285-38 : 1945     Admitting diagnosis: Confusion [R41.0]  CO2 retention [E87.29]  Sepsis due to pneumonia ([facility] [J18.9, A41.9]  Pneumonia due to infectious organism, unspecified laterality, unspecified part of lung [J18.9]       Medical History:   Past Medical History:   Diagnosis Date    A-fib ([facility]     Biventricular congestive heart failure ([facility]     Bronchiectasis with acute exacerbation ([facility] 2022    CAD (coronary artery disease)     Chronic pain syndrome     dilaudid infusion pump    COPD (chronic obstructive pulmonary disease) ([facility])     Depression     GERD (gastroesophageal reflux disease)     Hypothyroidism     Impaired fasting glucose     Iron deficiency anemia     Osteoporosis     Pulmonary fibrosis ([facility] 2022    Subdural hematoma ([facility] 2022       PATIENT ASSESSMENT    LABORATORY DATA  Hematology:   Lab Results   Component Value Date/Time    WBC 3.7 10/10/2022 05:10 AM    RBC 3.28 10/10/2022 05:10 AM    HGB 10.1 10/10/2022 05:10 AM    HCT 31.8 10/10/2022 05:10 AM     10/10/2022 05:10 AM     Chemistry:    Lab Results   Component Value Date/Time    PHART 7.332 10/10/2022 05:30 AM    OFT3PEZ 58.9 10/10/2022 05:30 AM    PO2ART 92.5 10/10/2022 05:30 AM    U0SXLOQN 96.4 10/10/2022 05:30 AM    NNQ7MET 30.5 10/10/2022 05:30 AM    PBEA 2.9 10/10/2022 05:30 AM       VITALS  Heart Rate: 71   Resp: 15  BP: 127/60  SpO2: 97 % O2 Device: (S) Nasal cannula  Temp: 97.6 °F (36.4 °C)    SKIN COLOR  [] Normal  [] Pale  [] Dusky  [] Cyanotic    RESPIRATORY PATTERN  [] Normal  [] Dyspnea  [] Cheyne-Granger  [] Kussmaul  [] Biots    AMBULATORY  [] Yes  [] No  [] With Assistance      Patient Acuity 0 1 2 3 4 Score   Level of Concious (LOC) [x]  Alert & Oriented or Pt normal LOC []  Confused;follows directions []  Confused & uncooper-ative []  Obtunded []  Comatose 0   Respiratory Rate  (RR) [x]  Reg. rate & pattern. 12 - 20 bpm  []  Increased RR. Greater than 20 bpm   []  SOB w/ exertion or RR greater than 24 bpm []  Access- ory muscle use at rest. Abn.  resp. []  SOB at rest.   0   Bilateral Breath Sounds (BBS) []  Clear []  Diminish-ed bases  []  Diminish-ed t/o, or rales   [x]  Sporadic, scattered wheezes or rhonchi []  Persistentwheezes and, or absent BBS 3   Cough []  Strong, effective, & non-prod. [x]  Effective & prod. Less than 25 ml (2 TBSP) over past 24 hrs []  Ineffective & non-prod to less than 25 ML over past 24 hrs []  Ineffective and, or greater than 25 ml sputum prod. past 24 hrs. []  Nonspon- taneous; Requires suctioning 1   Pulmonary History  (PULM HX) []  No smoking and no chronic pulmonaryhistory []  Former smoker. Quit over 12 mos. ago []  Current smoker or quit w/ in 12 mos []  Pulm. History and, or 20 pk/yr smoking hx [x]  Admitted w/ acute pulm. dx and, or has been admitted w/ pulm. dx 2 or more times over past 12 mos 4   Surgical History this Admit  (SURG HX) [x]  No surgery []  General surgery []  Lower abdominal []  Thoracic or upper abdominal   []  Thoracic w/ pulm. disease 0   Chest X-Ray (CXR)/CT Scan []  Clear or not applicable []  Not available []  Atelect- asis or pleural effusions []  Localized infiltrate or pulm. edema [x]  Con-solidated Infiltrates, bilateral, or in more than 1 lobe 4   Slow or Forced VC, FEV1 OR PEFR (PULM FXN)  [x]  80% or greater, or not indicated []  Pt. unable to perform []  FEV1 or PEFR or VC 51-79%.   []  FEV1 or PEFR or VC  30-49%   []  FEV1 or PEFR or VC less than 30%   0   TOTAL ACUITY: 14       CARE PLAN    If Acuity Level is 2, 3, or 4 in any of the following:    [] BILATERAL BREATH SOUNDS (BBS)     [] PULMONARY HISTORY (PULM HX)  [] PULMONARY FUNCTION (PULM FX)    Goal: Improve respiratory functions in patients with airway disease and decrease WOB    [x] AEROSOL PROTOCOL    Total Acuity:   16-32  []  Secondary Assessment in 24 hrs Total Acuity:  9-15  [x]  Secondary Assessment in 24 hrs Total Acuity:  4-8  []  Secondary Assessment in 48 hrs Total Acuity:  0-3  []  Secondary Assessment in 72 hrs   HHN AEROSOL THERAPY with  [physician-ordered bronchodilator(s)] q 4 & Albuterol PRN q2 hrs. Breath-Actuated Neb if BBS Acuity = 4, and pt. can use MP. Notify physician if condition deteriorates. HHN AEROSOL THERAPY with  [physician-ordered bronchodilator(s)]  QID and Albuterol PRN q4 hrs. Breath-Actuated Neb if BBS Acuity = 4, and pt. can use MP. Notify physician if condition deteriorates. MDI THERAPY with  2 actuations of [physician-ordered bronchodilator(s)] via spacer TID Albuterol and PRNq4 hrs. If unable to utilize MDI: HHN [physician-ordered bronchodilator(s)] TID and Albuterol PRN q4 hrs. Notify physician if condition deteriorates. MDI THERAPY with  [physician-ordered bronchodilator(s)] via spacer TID PRN. If unable to utilize MDI: HHN [physician-ordered bronchodilator(s)] TID PRN. Notify physician if condition deteriorates. If Acuity Level is 2, 3, or 4 in any of the following:    [] COUGH     [] SURGICAL HISTORY (SURG HX)  [] CHEST XRAY (CXR)    Goal: Improvement in sputum mobilization in patients with ineffective airway clearance. Reverse atelectasis.     [x] Bronchopulmonary Hygiene Protocol    Total Acuity:   16-32  []  Secondary Assessment in 24 hrs Total Acuity:  9-15  [x]  Secondary Assessment in 24 hrs Total Acuity:  4-8  []  Secondary Assessment in 48 hrs Total Acuity:  0-3  []  Secondary Assessment in 72 hrs   METANEB QID with [physician-ordered bronchodilator(s)] if CXR Acuity = 4; otherwise:  PD&P, PEP, or Vest QID & PRN  NT Sxn PRN for ineffective cough  METANEB QID with [physician-ordered bronchodilator(s)] if CXR Acuity = 4; otherwise:  PD&P, PEP, or Vest TID & PRN  NT Sxn PRN for ineffective cough  Instruct patient to self-perform IS q1hr WA   Directed Cough self-performed q1hr WA     If Acuity Level is 2 or above in the following:    [] PULMONARY HISTORY (PULM HX)    Goal: Assist patient in quitting smoking to slow or stop the progression of lung disease.     [] Smoking Cessation Protocol    SMOKING CESSATION EDUCATION provided according to policy [id number]_039: (marlyn with an X)  ____Yes    ____ No     ____ NA    Smoking Cessation Booklet given:  ____Yes  ____No ____Patient Georgette Asif

## 2022-10-10 NOTE — PROGRESS NOTES
Nutrition Note    Low sodium diet information attached to d/c instructions.      Electronically signed by Winnie Winn RD, LD on 10/10/22 at 10:25 AM EDT    Contact: [phone]

## 2022-10-10 NOTE — PROGRESS NOTES
Pt is A&Ox4, calm and cooperative, resting in bed, call light within reach. Assmt and VS completed as charted, see flow sheet. IV fluids continue as ordered, see MAR. Pt has persistent cough, reports bring up thick, yellow, sputum. O2 at [facility] at this time. Pt repots wearing Bipap most of the night. Pt denies further needs. Will continue to monitor.

## 2022-10-10 NOTE — PROGRESS NOTES
Re-assmt completed with no change, see MAR for details. Pt remains in chair,  visiting. Pt awake in chair, call light within reach, denies further needs. Will continue to monitor.

## 2022-10-10 NOTE — PROGRESS NOTES
Physical Therapy  Facility/Department: [facility] AT THE [facility]  Daily Treatment Note  NAME: Gael Ann  : 1945  MRN: [id number]    Date of Service: 10/10/2022    Discharge Recommendations:  Continue to assess pending progress, Subacute/Skilled Nursing Facility        Patient Diagnosis(es): The primary encounter diagnosis was Pneumonia due to infectious organism, unspecified laterality, unspecified part of lung. Diagnoses of CO2 retention and Confusion were also pertinent to this visit. Assessment   Assessment: CGA for transfers CG/Min Afor ambulation 5 feet BLE seated 2x15-20  Activity Tolerance: Patient tolerated treatment well     Plan    Physcial Therapy Plan  General Plan: 2 times a day 7 days a week  Current Treatment Recommendations: Strengthening;Balance training;Functional mobility training;Transfer training;ADL/Self-care training;Neuromuscular re-education;Gait training; Safety education & training;Patient/Caregiver education & training     Restrictions  Restrictions/Precautions  Restrictions/Precautions: General Precautions  Required Braces or Orthoses?: No     Subjective    Subjective  Subjective: Pt. in chair upon arrival, agreeable to therapy at this time. Pain: 9/10 back and BLE  Orientation  Overall Orientation Status: Within Normal Limits  Cognition  Overall Cognitive Status: WNL     Objective   Vitals     Bed Mobility Training  Bed Mobility Training: No  Balance  Sitting: Intact  Standing: Impaired  Transfer Training  Transfer Training: Yes  Overall Level of Assistance: Contact-guard assistance  Interventions: Verbal cues; Tactile cues  Sit to Stand: Contact-guard assistance  Stand to Sit: Contact-guard assistance  Gait Training  Gait Training: Yes  Gait  Distance (ft): 5 Feet  Assistive Device: Walker, rolling     PT Exercises  Exercise Treatment: BLE seated 2x20             Goals  Short Term Goals  Time Frame for Short Term Goals: 3 DAYS.   Short Term Goal 1: CGA GAIT 50 FT FWW  Short Term Goal 2: SBA TRANSFERS AND SBA BED MOBILITY. Long Term Goals  Time Frame for Long Term Goals : 4 WEEKS  Long Term Goal 1: IND GAIT  FT,  Long Term Goal 2: IND TRANSFERS  Patient Goals   Patient Goals : RETURN HOME WITH ASSIST FROM .     Education  Patient Education  Education Given To: Patient  Education Provided: Role of Therapy;Transfer Training  Barriers to Learning: None  Education Outcome: Verbalized understanding    Therapy Time   Individual Concurrent Group Co-treatment   Time In 1051         Time Out 1115         Minutes [address] [place] [address], [address]

## 2022-10-10 NOTE — PROGRESS NOTES
Comprehensive Nutrition Assessment    Type and Reason for Visit:  Initial    Nutrition Recommendations/Plan:   Continue current diet. Start 4 oz vanilla ensure enlive TID with meals. Malnutrition Assessment:  Malnutrition Status: At risk for malnutrition (Comment) (10/10/22 0820)    Context:  Acute Illness     Findings of the 6 clinical characteristics of malnutrition:  Energy Intake:  Mild decrease in energy intake (Comment)  Weight Loss:  No significant weight loss     Body Fat Loss:  No significant body fat loss     Muscle Mass Loss:  No significant muscle mass loss    Fluid Accumulation:  Mild Extremities (non pitting BLE)   Strength:  Not Performed    Nutrition Assessment:    Predicted suboptimal oral intakes r/t impaired respiratory function aeb PO 51-75%. Pt ageeable to have vanilla ensure enlive TID with meals. Pt with 16.8% weight gain from UBW. Malnutrition indices potentially masked by edema. Pt reports she usually drinks diet Pepsi at home. Recommended avoid carbonated beverage due to PCO2 58.9. Pt states she eats at home like she does here. Drinks vanilla Boost at times at home. Pt reports avoioding salt at home, encouraged to limit sodium to less than 2,000 mg per day, she declined offer of low sodium diet information. Typically eats 3 meals per day. Nutrition Related Findings:    No malnutrition indices noted Wound Type: None       Current Nutrition Intake & Therapies:    Average Meal Intake: 51-75%  Average Supplements Intake: None Ordered  ADULT DIET; Regular    Anthropometric Measures:  Height: 5' 5\" (165.1 cm)  Ideal Body Weight (IBW): 125 lbs (57 kg)    Admission Body Weight: 157 lb 10 oz (71.5 kg)  Current Body Weight: 167 lb (75.8 kg), 133.6 % IBW.  Weight Source: Bed Scale  Current BMI (kg/m2): 27.8  Usual Body Weight: 143 lb (64.9 kg)  % Weight Change (Calculated): 16.8  Weight Adjustment For: No Adjustment                 BMI Categories: Overweight (BMI 25.0-29. 9)    Estimated Daily Nutrient Needs:  Energy Requirements Based On: Kcal/kg  Weight Used for Energy Requirements: Current  Energy (kcal/day): 3679-8530 (20-23/kg)  Weight Used for Protein Requirements: Ideal  Protein (g/day): 74-91g (1.3-1.6g/kg)  Method Used for Fluid Requirements: ml/Kg  Fluid (ml/day): 1734 ml (23/kg)    Nutrition Diagnosis:   Predicted inadequate energy intake related to impaired respiratory function as evidenced by intake 51-75%    Nutrition Interventions:   Food and/or Nutrient Delivery: Continue Current Diet, Start Oral Nutrition Supplement  Nutrition Education/Counseling: Education declined  Coordination of Nutrition Care: Continue to monitor while inpatient  Plan of Care discussed with: Patient and her     Goals:     Goals: PO intake 75% or greater     Recent Labs     10/08/22  1101 10/09/22  0515 10/10/22  0510    142 143   K 3.4* 3.8 3.7   CL 98 106 107   CO2 35* 31 29   BUN 13 10 7*   CREATININE 0.55 0.53 0.46*   GLUCOSE 121* 96 94   ALT 11 7 8   ALKPHOS 98 72 68      Lab Results   Component Value Date/Time    LABALBU 3.1 10/10/2022 05:10 AM         Nutrition Monitoring and Evaluation:   Behavioral-Environmental Outcomes: Knowledge or Skill  Food/Nutrient Intake Outcomes: Food and Nutrient Intake, Supplement Intake  Physical Signs/Symptoms Outcomes: Biochemical Data, Weight, Fluid Status or Edema    Discharge Planning:    Continue current diet, Continue Oral Nutrition Supplement     Hiwot Dias RD, LD  Contact: [phone]

## 2022-10-10 NOTE — CONSULTS
Palliative Care Inpatient Consult    NAME:  Theresa Gary  MEDICAL RECORD NUMBER:  [id number]  AGE: 69 y.o. GENDER: female  : 1945  TODAY'S DATE:  10/10/2022    Reason for Consult:  ACP conversation    History of Present Illness     The patient is a 69 y.o. Non- / non  female who presents with Shortness of Breath (Onset this morning/)      Referred to Palliative Care by  [] Physician   [x] Nursing  [] Family Request   [] Other:      She was admitted to the ICU service for Confusion [R41.0]  CO2 retention [E87.29]  Seps[facility] ([facility] [J18.9, A41.9]  Pneumonia due to infectious organism, unspecified laterality, unspecified part of lung [J18.9]. The patient has a complicated medical history and has been hospitalized since 10/8/2022 10:06 AM.    Active Hospital Problems    Diagnosis Date Noted    Sepsis due to pneumonia ([facility] [J18.9, A41.9] 10/08/2022     Priority: Medium    Pulmonary fibrosis ([facility] [J84.10] 2022     Priority: Medium    COPD (chronic obstructive pulmonary disease) ([facility] [J44.9] 10/22/2020       Data         BP (!) 154/74   Pulse 88   Temp 97.5 °F (36.4 °C) (Temporal)   Resp 16   Ht 5' 5\" (1.651 m)   Wt 167 lb (75.8 kg)   SpO2 95%   BMI 27.79 kg/m²     Wt Readings from Last 3 Encounters:   10/10/22 167 lb (75.8 kg)   22 143 lb (64.9 kg)   22 143 lb (64.9 kg)        Code Status: Full Code     ADVANCED CARE PLANNING:  Patient has capacity for medical decisions: yes  Health Care Power of : no  Living Will: no     Personal, Social, and Family History  Marital Status:   Living situation:with family:  spouse  Importance of fawn/Presybeterian/spiritual beliefs: [] Very [] Somewhat [] Not   Psychological Distress: moderate  Does patient understand diagnosis/treatment? yes  Does caregiver understand diagnosis/treatment?  yes    Assessment        Symptom management/ pain control   Pain Assessment:  The patient is not having any pain.  Anxiety:  none  Dyspnea:  chronic dyspnea  Fatigue:  exercise intolerance  Other:    Palliative Performance Scale:     ___100% Full ambulation; normal activity and work; no evidence of disease; able to do own self care; normal intake; fully conscious  ___90% Full ambulation; normal activity and work; some evidence of disease; able to do own self care; normal intake; fully conscious  ___80% Full ambulation; normal activity with effort; some evidence of disease; able to do own self care; normal or reduced intake; fully conscious  ___70% Ambulation reduced; unable to perform normal job/work; significant disease; able to do own self care; normal or reduced intake; fully conscious  ___60%  Ambulation reduced; cannot do hobbies/housework; significant disease; occasional assist; intake normal or reduced; fully conscious/some confusion  __x_50%  Mainly sit/lie; can't do any work; extensive disease; considerable assist; intake normal or reduced; fully conscious/some confusion  ___40%  Mainly in bed; extensive disease; mainly assist; intake normal or reduced; fully conscious/ some confusion   ___30%  Bed bound; extensive disease; total care; intake reduced; fully conscious/some confusion  ___20%  Bed bound; extensive disease; total care; intake minimal; drowsy/coma  ___10%  Bed bound; extensive disease; total care; mouth care only; drowsy/coma  ___0       Death     Readmission Risk Score: 36%    Plan      Palliative Interaction: I arrive to room to see patient, she is currently sitting up in a chair with her  near her. I introduce myself to the pt and her  and explain my role in her care. Pt is alert and oriented for our conversation.  tells me that she was released from [facility] about a week ago and developed pneumonia. Pt recently had a fall and was life flighted to [place] for emergency brain surgery for a brain bleed.   Pt tells me that she does not like to discuss code status and things like that because \"it gets me upset and anxious\". I tell her that it is important to discuss these things as it is important for our family to know our wishes. She agrees and we start talking. She tells me \"I want everything done, I want to stay alive as long as possible no matter what\". I ask her the ACP activator questions and document her answers. I discuss DPOAHC and Living Will and explain the legal hierarchy in the [place], the patient states that she does not want to complete one now and that she will think about maybe completing it later. I have a lengthy conversation with both of them about their family and all that she has been through. They both deny any needs or questions at this time. Education/support to family  Education/support to patient  Continue with current plan of care  Code status clarified: Full Code    Principle Problem/Diagnosis:  Sepsis due to pneumonia [facility])    Goals of care evaluation   The patient goals of care are live longer, improve or maintain function/quality of life, and preserve independence/autonomy/control   Goals of care discussed with:    [] Patient independently    [x] Patient and Family    [] Family or Healthcare DPOA independently    [] Unable to discuss with patient, family/DPOA not present    Code Status  Full Code     Palliative Care will continue to follow Ms. South's care as needed. Thank you for allowing Palliative Care to participate in the care of Ms. South .      Electronically signed by   Lisbet Ziegler RN  Palliative Care Team  on 10/10/2022 at 3:01 PM    Palliative care office: [phone]

## 2022-10-10 NOTE — PROGRESS NOTES
2300 - Reassessment completed. Vital signs obtained and documented. Bipap remains in place. Patient denies needs at this time. Call light within reach. Will continue to monitor.

## 2022-10-10 NOTE — PLAN OF CARE
Problem: Discharge Planning  Goal: Discharge to home or other facility with appropriate resources  Outcome: Progressing  Flowsheets  Taken 10/10/2022 0715 by Audelia Modi RN  Discharge to home or other facility with appropriate resources: Identify barriers to discharge with patient and caregiver  D/C plan is SNF per Dr. Mathieu Bell recommendation    Problem: Safety - Adult  Goal: Free from fall injury  Outcome: Progressing  A&Ox4, able to call for assistance when needed. Call light within reach at all times. Bed locked. Bed alarm and/or personal alarm on at all times. Non-skid socks on when out of bed. Assistance provided for transfers, assistive device used for ambulation. Will continue to monitor. Problem: Skin/Tissue Integrity  Goal: Absence of new skin breakdown  Description: 1. Monitor for areas of redness and/or skin breakdown  2. Assess vascular access sites hourly  3. Every 4-6 hours minimum:  Change oxygen saturation probe site  4. Every 4-6 hours:  If on nasal continuous positive airway pressure, respiratory therapy assess nares and determine need for appliance change or resting period. Outcome: Progressing  Skin assessment completed per shift and as needed. No skin concerns noted at this time. Pt turns self independently t/o shift. Will continue to monitor. Problem: Pain  Goal: Verbalizes/displays adequate comfort level or baseline comfort level  Outcome: Progressing  Pain assessed t/o shift. Pt instructed on rating pain scale, verbalized understanding. Pain medication administered per [facility] with some effect. Will continue to monitor.

## 2022-10-10 NOTE — PROGRESS NOTES
Occupational Therapy  Facility/Department: [facility]  Daily Treatment Note  NAME: Stephanie Wright  : 1945  MRN: [id number]    Date of Service: 10/10/2022    Discharge Recommendations:  Continue to assess pending progress, Subacute/Skilled Nursing Facility, Home with Home health OT         Patient Diagnosis(es): The primary encounter diagnosis was Pneumonia due to infectious organism, unspecified laterality, unspecified part of lung. Diagnoses of CO2 retention and Confusion were also pertinent to this visit. Assessment    Activity Tolerance: Patient tolerated treatment well  Discharge Recommendations: Continue to assess pending progress;Subacute/Skilled Nursing Facility;Home with Home health OT      Plan   Occupational Therapy Plan  Times Per Week: 7  Times Per Day: Once a day  Current Treatment Recommendations: Strengthening; Functional mobility training;Self-Care / ADL; Safety education & training; Endurance training     Restrictions  Restrictions/Precautions  Restrictions/Precautions: General Precautions    Subjective   Subjective  Subjective: Pt sitting up in recliner upon arrival. Pt agreed to participate in therapy session this date. Pain: Pt had no complaints of pain this date. Orientation  Overall Orientation Status: Within Normal Limits  Pain: 9/10 back and BLE  Cognition  Overall Cognitive Status: WNL        Objective    Vitals          OT Exercises  Exercise Treatment: Pt tolerated BUE ther ex with 1# dumbbell x 7 planes x 15 reps x 1 set to increase UE strength and endurance in order to ease completion of ADL tasks. pt required occ RBs secondary to fatigue. Safety Devices  Type of Devices: Call light within reach; Left in chair     Patient Education  Education Given To: Patient  Education Provided: Role of Therapy;Plan of Care  Education Method: Verbal  Barriers to Learning: None  Education Outcome: Verbalized understanding    Goals  Short Term Goals  Time Frame for Short Term Goals: 20 visits  Short Term Goal 1: Patient to tolerate 15 minutes ther-ex/ther-act to increase ease of ADL participation. Short Term Goal 2: Patient to complete standing sinkside ADLs with SBA. Short Term Goal 3: Patient to complete LB bathing/dressing with SBA. Short Term Goal 4: Pt will be educated on EC strategies and breathing techniques to decrease fatigue and improve participation in ADL's.        Therapy Time   Individual Concurrent Group Co-treatment   Time In 1133         Time Out 1156         Minutes 23                 EB Mcdonough/BOB

## 2022-10-10 NOTE — DISCHARGE INSTR - COC
Continuity of Care Form    Patient Name: Kaitlin Vivar   :  1945  MRN:  [id number]    Admit date:  10/8/2022  Discharge date:  10/18/2022    Code Status Order: Full Code   Advance Directives:     Admitting Physician:  Shayy Lo MD  PCP: Shayy Lo MD    Discharging Nurse: [facility] Unit/Room#: 7286/2203-12  Discharging Unit Phone Number: [phone]    Emergency Contact:   Extended Emergency Contact Information  Primary Emergency Contact: Jerrell South  Address: [facility] [address] Phone: [phone]  Mobile Phone: [phone]  Relation: Spouse  Hearing or visual needs: None  Other needs: None  Preferred language: English   needed? No  Secondary Emergency Contact: [address] Phone: [phone]  Relation: Child  Preferred language: English   needed?  No    Past Surgical History:  Past Surgical History:   Procedure Laterality Date    BACK SURGERY  1998    spinal cord stimulator    BACK SURGERY  1999    infusion pump insertion    BACK SURGERY  10/23/2003    spinal cord stimulator removed    BACK SURGERY  10/23/2003    new infusion pump placed    BACK SURGERY  2017    replaced infusion pump    CARPAL TUNNEL RELEASE Right 1999    CARPAL TUNNEL RELEASE Left 1999    CARPAL TUNNEL RELEASE Right 2002    CARPAL TUNNEL RELEASE Left 2003    CERVICAL DISC SURGERY  10/25/2004    anterior cervical diskectomy C7-T1    CERVICAL DISC SURGERY  2004    anterior cervical discectomy M9-8 (complicated by damaged nerve to vocal cord)    CRANIOTOMY Left 2022    LEFT CRANIOTOMY FOR SUBDURAL HEMATOMA EVACUATION performed by Barbra Calixto DO at [address] [place] [address] Left 2018    ORIF wrist    HUMERUS FRACTURE SURGERY Right 10/03/2010    HYSTERECTOMY (CERVIX STATUS UNKNOWN)  1991    KNEE ARTHROSCOPY Right 2011    KNEE SURGERY Right 02/04/2016    repair distal portion of knee arthroplasty due to prosthesis loosening    LUMBAR DISC SURGERY  02/15/1994    due to scar tissue from previous surgery    LUMBAR DISCECTOMY  08/30/1993    L5-S1    LUMBAR LAMINECTOMY  06/09/2008    L3-4-5    NECK SURGERY  05/03/2002    posterior plate fusion    NECK SURGERY  12/09/2005    reconnect nerve to vocal cord [facility]    TOE AMPUTATION  10/08/2006    left 3rd toe - osteomyelitis    TOE AMPUTATION  03/07/2008    left 4th toe partial amputation    TOE AMPUTATION  10/03/2008    left 2nd toe    TOTAL KNEE ARTHROPLASTY Right 03/19/2021    WRIST FRACTURE SURGERY Left 12/20/2018    WRIST OPEN REDUCTION INTERNAL FIXATION-DISTAL RADIUS performed by Yaniv Perdue MD at [facility] Left 12/20/2018    WRIST SURGERY Left 07/02/2019    left wrist ulnar shortening osteotomy       Immunization History:   Immunization History   Administered Date(s) Administered    COVID-19, MODERNA BLUE border, Primary or Immunocompromised, (age 12y+), IM, 100 mcg/0.5mL 02/27/2021, 03/27/2021, 10/25/2021, 04/18/2022    Influenza Virus Vaccine 11/19/2011    Influenza, High Dose (Fluzone 65 yrs and older) 10/07/2015, 09/30/2016, 10/04/2018, 11/08/2019    Influenza, Triv, inactivated, subunit, adjuvanted, IM (Fluad 65 yrs and older) 10/16/2017, 10/04/2018    Pneumococcal Conjugate 13-valent (Caprice Long) 05/25/2017, 06/05/2018, 07/31/2018    Pneumococcal Polysaccharide ([id number]) 03/19/2010, 06/05/2014    Td (Adult), 5 Lf Tetanus Toxoid, Pf (Tenivac, Decavac) 08/22/2022    Zoster Recombinant (Shingrix) 12/22/2021, 03/01/2022       Active Problems:  Patient Active Problem List   Diagnosis Code    Hypothyroidism E03.9    Major depressive disorder F32.9    Chronic midline low back pain without sciatica M54.50, G89.29    Iron deficiency anemia D50.9    COPD (chronic obstructive pulmonary disease) ([facility]) J44.9    Biventricular congestive heart failure ([facility]) I50.82 Anemia D64.9    A-fib ([facility]) I48.91    Pulmonary fibrosis (formerly [facility]) J84.10    Subdural hematoma S06. 5XAA    Fall as cause of accidental injury in home as place of occurrence, initial encounter W19. 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 G93.89, S06.2X0S    Sepsis due to pneumonia ([facility] J18.9, A41.9       Isolation/Infection:   Isolation            No Isolation          Patient Infection Status       Infection Onset Added Last Indicated Last Indicated By Review Planned Expiration Resolved Resolved By    None active    Resolved    COVID-19 (Rule Out) 10/08/22 10/08/22 10/08/22 Respiratory Panel, Molecular, with COVID-19 (Restricted: peds pts or suitable admitted adults) (Ordered)   10/08/22 Rule-Out Test Resulted    COVID-19 (Rule Out) 10/08/22 10/08/22 10/08/22 COVID-19, Rapid (Ordered)   10/08/22 Rule-Out Test Resulted    COVID-19 (Rule Out) 22 COVID-19, Rapid (Ordered)   22 Rule-Out Test Resulted    COVID-19 (Rule Out) 22 COVID-19, Rapid (Ordered)   22 Rule-Out Test Resulted    COVID-19 (Rule Out) 22 Respiratory Panel, Molecular, with COVID-19 (Restricted: peds pts or suitable admitted adults) (Ordered)   22 Rule-Out Test Resulted    COVID-19 (Rule Out) 22 COVID-19, Rapid (Ordered)   22 Rule-Out Test Resulted    COVID-19 (Rule Out) 22 Respiratory Panel, Molecular, with COVID-19 (Restricted: peds pts or suitable admitted adults) (Ordered)   22 Rule-Out Test Resulted    COVID-19 (Rule Out) 01/15/22 01/15/22 01/15/22 COVID-19 (Ordered)   22 Rule-Out Test Resulted    COVID-19 (Rule Out) 01/15/22 01/15/22 01/15/22 COVID-19, Rapid (Ordered)   01/15/22 Rule-Out Test Resulted    COVID-19 20 COVID-19   21     S/s     COVID-19 (Rule Out) 20 COVID-19 (Ordered)   20 Rule-Out Test Resulted    COVID-19 (Rule Out) 10/22/20 10/22/20 10/22/20 COVID-19, PCR (Ordered)   10/22/20 Rule-Out Test Resulted    COVID-19 (Rule Out) 08/16/20 08/16/20 08/16/20 COVID-19, PCR (Ordered)   08/16/20 Rule-Out Test Resulted            Nurse Assessment:  Last Vital Signs: /66   Pulse 84   Temp 97.9 °F (36.6 °C) (Temporal)   Resp 18   Ht 5' 5\" (1.651 m)   Wt 160 lb 4.8 oz (72.7 kg)   SpO2 97%   BMI 26.68 kg/m²     Last documented pain score (0-10 scale): Pain Level: 7  Last Weight:   Wt Readings from Last 1 Encounters:   10/18/22 160 lb 4.8 oz (72.7 kg)     Mental Status:  oriented, alert, and coherent    IV Access:  - None    Nursing Mobility/ADLs:  Walking   Dependent  Transfer  Assisted  Bathing  Assisted  Dressing  Assisted  Toileting  Assisted  Feeding  Independent  Med Admin  Assisted  Med Delivery   whole and prefers mixed with applesauce    Wound Care Documentation and Therapy:  Puncture 08/25/22 Head (Active)   Number of days: 53       Wound 08/22/22 Face Left;Upper (Active)   Number of days: 56       Incision 08/23/22 Head Left (Active)   Number of days: 56        Elimination:  Continence: Bowel: Yes  Bladder: Yes  Urinary Catheter: None   Colostomy/Ileostomy/Ileal Conduit: No       Date of Last BM: 10/17/2022    Intake/Output Summary (Last 24 hours) at 10/18/2022 1410  Last data filed at 10/18/2022 0511  Gross per 24 hour   Intake 860 ml   Output 3100 ml   Net -2240 ml     I/O last 3 completed shifts: In: 1560 [P.O.:1560]  Out: 5000 [Urine:5000]    Safety Concerns:      At Risk for Falls    Impairments/Disabilities:      Vision and Hearing    Nutrition Therapy:  Current Nutrition Therapy:   - Oral Diet:  General  - Oral Nutrition Supplement:  None    Routes of Feeding: Oral  Liquids: No Restrictions  Daily Fluid Restriction: no  Last Modified Barium Swallow with Video (Video Swallowing Test): not done    Treatments at the Time of Hospital Discharge:   Respiratory Treatments: none    Oxygen Therapy:  is on oxygen at 2 L/min per nasal cannula. Ventilator:    [address] Vent [id number]}    Rehab Therapies: Physical Therapy and Occupational Therapy  Weight Bearing Status/Restrictions: No weight bearing restrictions  Other Medical Equipment (for information only, NOT a DME order):  wheelchair, walker, bath bench, and bedside commode  Other Treatments: none\    Patient's personal belongings (please select all that are sent with patient):  None    RN SIGNATURE:  Electronically signed by Charlie Gama RN on 10/18/22 at 12:26 PM EDT    CASE MANAGEMENT/SOCIAL WORK SECTION    Inpatient Status Date: 10/8/22    Readmission Risk Assessment Score:  Readmission Risk              Risk of Unplanned Readmission:  34           Discharging to Facility/ Agency   Name: [facility] @ [address] [place] [address] (if applicable)   Name:  Address:  Dialysis Schedule:  Phone:  Fax:    / signature: Electronically signed by BUZZ Orozco on 10/10/22 at 12:57 PM EDT    PHYSICIAN SECTION    Prognosis: Fair    Condition at Discharge: Stable    Rehab Potential (if transferring to Rehab): Fair    Recommended Labs or Other Treatments After Discharge: na    Physician Certification: I certify the above information and transfer of Brent Dias  is necessary for the continuing treatment of the diagnosis listed and that she requires [facility] for less 30 days.      Update Admission H&P: No change in H&P    PHYSICIAN SIGNATURE:  Electronically signed by Cristela Lennox, APRN - CNP on 10/18/22 at 11:29 AM EDT

## 2022-10-10 NOTE — PROGRESS NOTES
INTENSIVE CARE UNIT   APRN - Progress Note    Patient - Rocael Rahman  Date of Admission -  10/8/2022 10:06 AM  Date of Evaluation -  10/10/2022  Hospital Day - 2      SUBJECTIVE:     The Rocael Rahman is a 69 y.o. female who is seen for follow up in the ICU. Per nursing report and notes, overnight events include: no significant events. She resting in bed alert oriented no acute distress. She states she feels better. Her breathing is easier. ROS:   Constitutional: negative  for fevers, and negative for chills. Respiratory: positive for shortness of breath, negative for cough, and negative for wheezing  Cardiovascular: negative for chest pain, and negative for palpitations  Gastrointestinal: negative for abdominal pain, negative for nausea,negative for vomiting, negative for diarrhea, and negative for constipation    All other systems were reviewed with the patient and are negative unless otherwise stated in HPI.       OBJECTIVE:     VITAL SIGNS:  Patient Vitals for the past 8 hrs:   BP Temp Temp src Pulse Resp SpO2 Height Weight   10/10/22 0821 -- -- -- -- -- -- 5' 5\" (1.651 m) --   10/10/22 0800 -- -- -- 89 19 93 % -- --   10/10/22 0715 -- 97.6 °F (36.4 °C) Temporal 79 19 98 % -- --   10/10/22 0700 (!) 102/59 -- -- 60 10 96 % -- --   10/10/22 0524 -- -- -- -- -- -- -- 167 lb (75.8 kg)   10/10/22 0500 (!) 156/66 -- -- 55 17 -- -- --   10/10/22 0400 136/61 -- -- 56 17 -- -- --   10/10/22 0300 129/65 97.8 °F (36.6 °C) Axillary 57 17 98 % -- --   10/10/22 0200 (!) 116/92 -- -- 54 14 99 % -- --   10/10/22 0100 (!) 113/56 -- -- 57 16 98 % -- --         Temp: 97.6 °F (36.4 °C)  Temp range:    Temp  Av.9 °F (36.6 °C)  Min: 97.6 °F (36.4 °C)  Max: 98.3 °F (36.8 °C)    BP: (!) 102/59  BP Range:      Systolic (83SDK), NID:827 , Min:101 , PXD:466      Diastolic (95FUV), OZE:05, Min:47, Max:92    Heart Rate: 89  Pulse Range:    Pulse  Av.4  Min: 54  Max: 108    Resp: 19  Resp Range:   Resp  Avg: 17.1  Min: 10  Max: 25    SpO2: 93 % on supplemental O2  SpO2 range:   SpO2  Av.7 %  Min: 90 %  Max: 100 %    Weight  Wt Readings from Last 3 Encounters:   10/10/22 167 lb (75.8 kg)   22 143 lb (64.9 kg)   22 143 lb (64.9 kg)     Body mass index is 27.79 kg/m². 24HR INTAKE/OUTPUT:      Intake/Output Summary (Last 24 hours) at 10/10/2022 0839  Last data filed at 10/10/2022 0525  Gross per 24 hour   Intake 5568.19 ml   Output 3625 ml   Net 1943.19 ml     Date 10/10/22 0000 - 10/10/22 2359   Shift 8044-0344 0371-3555 8314-6793 24 Hour Total   INTAKE   I.V.(mL/kg/hr) 2632. 2(4.3)   2632.2   Shift Total(mL/kg) 2632. 2(34.7)   2632. 2(34.7)   OUTPUT   Urine(mL/kg/hr) 1800(3)   1800   Shift Total(mL/kg) 1800(23.8)   1800(23.8)   Weight (kg) 75.7 75.7 75.7 75.7         PHYSICAL EXAM:  GEN:    Awake and following commands:     [] No   [x] Yes  MENTAL STATUS: alert and oriented x3. DISTRESS: Acute respiratory distress:       [] No   [] Yes  EYES:  EOMI, pupils equal   NECK: Supple. No lymphadenopathy. No carotid bruit  CVS:    regular but tachycardic, no audible murmur  PULM:  diminished but clear without wheezing, rales or rhonchi, no acute respiratory distress  ABD:    Bowels sounds normal.  Abdomen is soft. No distention. no tenderness to palpation. EXT:   no edema bilaterally . No calf tenderness. NEURO: Moves all extremities. Motor and sensory are grossly intact  SKIN:  No rashes. No skin lesions.           MEDICATIONS:  Scheduled Meds:   [Held by provider] amLODIPine  10 mg Oral Daily    DULoxetine  60 mg Oral Daily    hydroxychloroquine  300 mg Oral Daily    levETIRAcetam  1,000 mg Oral BID    levothyroxine  200 mcg Oral Daily    pantoprazole  40 mg Oral QAM AC    potassium bicarb-citric acid  20 mEq Oral BID    pregabalin  300 mg Oral BID    sertraline  50 mg Oral Daily    traZODone  200 mg Oral Nightly    tiotropium-olodaterol  2 puff Inhalation Daily    sodium chloride flush  5-40 mL IntraVENous 2 times per day    famotidine  20 mg Oral BID    enoxaparin  40 mg SubCUTAneous Daily    piperacillin-tazobactam  4,500 mg IntraVENous Q8H    levofloxacin  750 mg IntraVENous Q24H    ipratropium-albuterol  3 mL Inhalation 4x daily     Continuous Infusions:   sodium chloride 150 mL/hr at 10/10/22 0740    sodium chloride       PRN Meds:   HYDROcodone-acetaminophen, 1 tablet, Q6H PRN  Benzocaine-Menthol, 1 lozenge, Q2H PRN  ondansetron, 4 mg, Q8H PRN  sodium chloride flush, 5-40 mL, PRN  sodium chloride, 25 mL, PRN  acetaminophen, 650 mg, Q6H PRN   Or  acetaminophen, 650 mg, Q6H PRN  albuterol, 2.5 mg, Q4H PRN            VASOPRESSORS:    [x] No      [] Yes  [] Levophed      [] Dopamine     [] Vasopressin      [] Dobutamine     [] Phenylephrine     [] Epinephrine        DATA:  Complete Blood Count:   Recent Labs     10/08/22  1101 10/09/22  0515 10/10/22  0510   WBC 11.7* 6.2 3.7   RBC 3.93* 3.39* 3.28*   HGB 12.0 10.4* 10.1*   HCT 37.2 32.5* 31.8*   MCV 94.7 95.9 97.0   RDW 13.2 13.5 13.6    201 178   SEGS 86* 71* 57   NEUTROABS 10.00* 4.37 2.10   LYMPHOPCT 5* 15* 23*   LYMPHSABS 0.56* 0.94* 0.83*   MONOPCT 8 11 13*   EOSRELPCT 1 3 6*   BASOPCT 0 1 1   IMMGRAN 0 0 0        Recent Blood Glucose:   Recent Labs     10/08/22  1101 10/09/22  0515 10/10/22  0510   GLUCOSE 121* 96 94        Comprehensive Metabolic Profile:   Recent Labs     10/08/22  1101 10/09/22  0515 10/10/22  0510   BUN 13 10 7*   CREATININE 0.55 0.53 0.46*    142 143   K 3.4* 3.8 3.7   CL 98 106 107   CALCIUM 9.4 8.5* 8.7   ANIONGAP 6* 5* 7*   CO2 35* 31 29   PROT 6.6 5.4* 5.7*   LABALBU 3.9 2.9* 3.1*   BILITOT 0.3 0.4 0.2*   ALKPHOS 98 72 68   AST 18 10 10   ALT 11 7 8        Urinalysis:   Lab Results   Component Value Date/Time    NITRU NEGATIVE 10/08/2022 11:24 AM    COLORU Yellow 10/08/2022 11:24 AM    PHUR 6.0 10/08/2022 11:24 AM    WBCUA 0 TO 2 09/09/2022 09:29 PM    RBCUA 0 TO 2 09/09/2022 09:29 PM    MUCUS NOT REPORTED 10/14/2021 08:07 PM    TRICHOMONAS NOT REPORTED 10/14/2021 08:07 PM    YEAST NOT REPORTED 10/14/2021 08:07 PM    BACTERIA 1+ 09/09/2022 09:29 PM    SPECGRAV 1.015 10/08/2022 11:24 AM    LEUKOCYTESUR NEGATIVE 10/08/2022 11:24 AM    UROBILINOGEN Normal 10/08/2022 11:24 AM    BILIRUBINUR NEGATIVE 10/08/2022 11:24 AM    GLUCOSEU NEGATIVE 10/08/2022 11:24 AM    KETUA NEGATIVE 10/08/2022 11:24 AM    AMORPHOUS 2+ 09/09/2022 09:29 PM       HgBA1c:    Lab Results   Component Value Date/Time    LABA1C 5.7 06/20/2022 10:00 AM       TSH:    Lab Results   Component Value Date/Time    TSH 11.11 06/20/2022 10:01 AM       Lactic Acid:   Lab Results   Component Value Date/Time    LACTA 1.9 04/28/2022 03:14 PM    LACTA 2.0 02/06/2022 10:51 AM    LACTA 2.5 02/05/2022 03:30 PM        High Sensitivity Troponin:  Recent Labs     10/08/22  1101   TROPHS 12     Pro-BNP:  Lab Results   Component Value Date    PROBNP 117 10/08/2022     D-Dimer:  Lab Results   Component Value Date    DDIMER 0.69 (H) 01/18/2022     PT/INR:    Lab Results   Component Value Date/Time    PROTIME 13.0 10/08/2022 11:01 AM    INR 1.0 10/08/2022 11:01 AM     PTT:    Lab Results   Component Value Date/Time    APTT 28.7 10/08/2022 11:01 AM       CRP: No results for input(s): CRP in the last 72 hours. ABGs:   Lab Results   Component Value Date/Time    PHART 7.332 10/10/2022 05:30 AM    HFK8OTQ 58.9 10/10/2022 05:30 AM    PO2ART 92.5 10/10/2022 05:30 AM    MGO8HBB 30.5 10/10/2022 05:30 AM    HKP2MXF 35 11/02/2020 04:16 AM    L9MXLQEZ 96.4 10/10/2022 05:30 AM    FIO2 28 10/10/2022 05:30 AM         Radiology/Imaging:  CT Head W/O Contrast   Final Result   No acute process. No significant change in primarily low-density left   convexity extra-axial collection. XR CHEST PORTABLE   Final Result   Suspected bilateral infectious/inflammatory airways process.                ASSESSMENT / PLAN:     Sepsis due to pneumonia [facility])  Continue current therapy  Continue IV Zosyn  Continue IV Levaquin  Continue IV fluids  Trend labs-improving  Acute on chronic respiratory failure with hypoxia and hypercapnia  Trend ABGs-improving  Wean oxygen  COPD/pulmonary fibrosis  Continue Anoro Ellipta, duo nebs  Hypertension  Continue amlodipine  Chronic pain syndrome  Continue Norco  Nutrition status:   at risk for malnutrition  Dietician consult initiated  [] NPO [] TPN      [] Tube feed [] Clear liquid        [] Full liquid [x] regular diet         [] Fluid restriction   [] Diabetic diet   ICU Prophylaxis:   DVT: Lovenox   Stress Ulcer: H2 Blocker   High risk medications: none   Disposition:    Transfer out of ICU:  [] yes [x] no   Discharge plan is pending  Total critical care time caring for this patient with life threatening, unstable organ failure, including direct patient contact, management of life support systems, review of data including imaging and labs, discussions with other team members and physicians at least 0 minutes so far today, excluding separately billable procedures.       RYAN Terry - CNP , RYAN-NP-C  10/10/2022  8:39 AM

## 2022-10-10 NOTE — PROGRESS NOTES
Physical Therapy  Facility/Department: [facility] AT THE [facility]  Daily Treatment Note  NAME: Tyrese Vargas  : 1945  MRN: [id number]  Date of Service: 10/10/2022  Discharge Recommendations:  Continue to assess pending progress, Subacute/Skilled Nursing Facility   Patient Diagnosis(es): The primary encounter diagnosis was Pneumonia due to infectious organism, unspecified laterality, unspecified part of lung. Diagnoses of CO2 retention and Confusion were also pertinent to this visit. Assessment   Assessment: Pt. fatigues easily, requried frequent RB with activity. Weight shifting while standing and able to tolerate standing for aprox 3 minutes, noted increased shakiness. Seated exercises B Le 15x2. Transfers:CGA/Min A  Activity Tolerance: Patient tolerated treatment well;Patient limited by fatigue;Patient limited by endurance   Plan    Physcial Therapy Plan  General Plan: 2 times a day 7 days a week  Current Treatment Recommendations: Strengthening;Balance training;Functional mobility training;Transfer training;ADL/Self-care training;Neuromuscular re-education;Gait training; Safety education & training;Patient/Caregiver education & training   Restrictions  Restrictions/Precautions  Restrictions/Precautions: General Precautions  Required Braces or Orthoses?: No   Subjective    Subjective  Subjective: Pt. in chair upon arrival, agreeable to therapy at this time.   Pain: 9/10 back and BLE  Orientation  Overall Orientation Status: Within Normal Limits  Cognition  Overall Cognitive Status: WNL   Objective   Bed Mobility Training  Bed Mobility Training: No  Balance  Sitting: Intact  Standing: Impaired  Transfer Training  Transfer Training: Yes  Overall Level of Assistance: Contact-guard assistance;Minimum assistance  Interventions: Verbal cues  Sit to Stand: Contact-guard assistance;Minimum assistance  Stand to Sit: Contact-guard assistance  Gait Training  Gait Training: No  Gait  Distance (ft): 5 Feet  Assistive Device: Walker, rolling  PT Exercises  Exercise Treatment: Seated exercises B Le 15x2  Safety Devices  Type of Devices: Call light within reach; Left in chair   Goals  Short Term Goals  Time Frame for Short Term Goals: 3 DAYS. Short Term Goal 1: CGA GAIT 50 FT FWW  Short Term Goal 2: SBA TRANSFERS AND SBA BED MOBILITY. Long Term Goals  Time Frame for Long Term Goals : 4 WEEKS  Long Term Goal 1: IND GAIT  FT,  Long Term Goal 2: IND TRANSFERS  Patient Goals   Patient Goals : RETURN HOME WITH ASSIST FROM .   Education  Patient Education  Education Given To: Patient  Education Provided: Role of Therapy;Transfer Training  Education Method: Demonstration  Barriers to Learning: None  Education Outcome: Verbalized understanding  Therapy Time   Individual Concurrent Group Co-treatment   Time In 7930         Time Out 1312         Minutes [address] [place], [place]

## 2022-10-10 NOTE — PROGRESS NOTES
Pt continues to remain off O2 at this time, t/o most of shift, see flow sheet for details. Occasional non-prod cough noted, Robitussian effective for cough. Pt resting in chair, visiting with several family members, talking, laughing noted without resp distress noted/reported. Pt participated with PT & OT t/o shift today, see notes for details. Pt denies further needs, call light within reach. Will continue to monitor.

## 2022-10-10 NOTE — ACP (ADVANCE CARE PLANNING)
Advance Care Planning     Advance Care Planning Activator (Inpatient)  Conversation Note      Date of ACP Conversation: 10/10/2022     Conversation Conducted with: Patient with Decision Making Capacity    ACP Activator: Radha Moraes RN        Health Care Decision Maker:     Current Designated Health Care Decision Maker:     Primary Decision Maker (Active): Anny Sanchez Spouse - [phone]    Secondary Decision Maker: Opal Walters - Child - [phone]    Supplemental (Other) Decision Maker: Jasbir Bergman Child - [phone]      Care Preferences    Ventilation: \"If you were in your present state of health and suddenly became very ill and were unable to breathe on your own, what would your preference be about the use of a ventilator (breathing machine) if it were available to you? \"      Would the patient desire the use of ventilator (breathing machine)?: yes    \"If your health worsens and it becomes clear that your chance of recovery is unlikely, what would your preference be about the use of a ventilator (breathing machine) if it were available to you? \"     Would the patient desire the use of ventilator (breathing machine)?: Yes      Resuscitation  \"CPR works best to restart the heart when there is a sudden event, like a heart attack, in someone who is otherwise healthy. Unfortunately, CPR does not typically restart the heart for people who have serious health conditions or who are very sick. \"    \"In the event your heart stopped as a result of an underlying serious health condition, would you want attempts to be made to restart your heart (answer \"yes\" for attempt to resuscitate) or would you prefer a natural death (answer \"no\" for do not attempt to resuscitate)? \" yes       [x] Yes   [] No   Educated Patient / Darreld Bosworth regarding differences between Advance Directives and portable DNR orders.     Length of ACP Conversation in minutes:  35    Conversation Outcomes:  [x] ACP discussion completed  [] Existing advance directive reviewed with patient; no changes to patient's previously recorded wishes  [] New Advance Directive completed  [] Portable Do Not Rescitate prepared for Provider review and signature  [] POLST/POST/MOLST/MOST prepared for Provider review and signature      Follow-up plan:    [] Schedule follow-up conversation to continue planning  [x] Referred individual to Provider for additional questions/concerns   [] Advised patient/agent/surrogate to review completed ACP document and update if needed with changes in condition, patient preferences or care setting    [x] This note routed to one or more involved healthcare providers  [address] [place] [address] and Samantha Nurse Coordinator  10/10/2022 2:58 PM

## 2022-10-10 NOTE — PROGRESS NOTES
Discussed discharge plans with the patient and . Patient is a 69year old female here with Sepsis due to pneumonia . She is alert , oriented , pleasant , cooperative during our conversation. Patient is  and lives at home with her . She has oxygen, walker, wheelchair,chest vest, shower chair, and grabs at home. The patient does the cleaning and the  does the cooking. She is independent with her ADL's. Her  manages her  medications and her  also provides the transportation. She has [employer] in the home. Her PCP is Dr. Elieser Alva MD.  She has medical insurance that helps with medication costs. The discharge plan is to go to the [facility]. Patient was recently discharge from there less than a month ago. Referral made and paper work fax to the SNF. She does not have advance directives but her  would make medical decision if she is unable too. LSW to monitor and assist with any needs or concerns as they arise.     BUZZ Green

## 2022-10-11 LAB
ABSOLUTE EOS #: 0.23 K/UL (ref 0–0.44)
ABSOLUTE IMMATURE GRANULOCYTE: <0.03 K/UL (ref 0–0.3)
ABSOLUTE LYMPH #: 0.78 K/UL (ref 1.1–3.7)
ABSOLUTE MONO #: 0.41 K/UL (ref 0.1–1.2)
ALBUMIN SERPL-MCNC: 3.2 G/DL (ref 3.5–5.2)
ALBUMIN/GLOBULIN RATIO: 1.2 (ref 1–2.5)
ALP BLD-CCNC: 70 U/L (ref 35–104)
ALT SERPL-CCNC: 8 U/L (ref 5–33)
ANION GAP SERPL CALCULATED.3IONS-SCNC: 6 MMOL/L (ref 9–17)
AST SERPL-CCNC: 10 U/L
BASOPHILS # BLD: 1 % (ref 0–2)
BASOPHILS ABSOLUTE: <0.03 K/UL (ref 0–0.2)
BILIRUB SERPL-MCNC: 0.2 MG/DL (ref 0.3–1.2)
BUN BLDV-MCNC: 7 MG/DL (ref 8–23)
BUN/CREAT BLD: 15 (ref 9–20)
CALCIUM SERPL-MCNC: 9 MG/DL (ref 8.6–10.4)
CHLORIDE BLD-SCNC: 106 MMOL/L (ref 98–107)
CO2: 31 MMOL/L (ref 20–31)
CREAT SERPL-MCNC: 0.47 MG/DL (ref 0.5–0.9)
EOSINOPHILS RELATIVE PERCENT: 7 % (ref 1–4)
GFR SERPL CREATININE-BSD FRML MDRD: >60 ML/MIN/1.73M2
GLUCOSE BLD-MCNC: 91 MG/DL (ref 70–99)
HCT VFR BLD CALC: 34.1 % (ref 36.3–47.1)
HEMOGLOBIN: 10.9 G/DL (ref 11.9–15.1)
IMMATURE GRANULOCYTES: 0 %
LYMPHOCYTES # BLD: 25 % (ref 24–43)
MCH RBC QN AUTO: 30.9 PG (ref 25.2–33.5)
MCHC RBC AUTO-ENTMCNC: 32 G/DL (ref 28.4–34.8)
MCV RBC AUTO: 96.6 FL (ref 82.6–102.9)
MONOCYTES # BLD: 13 % (ref 3–12)
NRBC AUTOMATED: 0 PER 100 WBC
PDW BLD-RTO: 13.7 % (ref 11.8–14.4)
PLATELET # BLD: 201 K/UL (ref 138–453)
PMV BLD AUTO: 9.9 FL (ref 8.1–13.5)
POTASSIUM SERPL-SCNC: 3.6 MMOL/L (ref 3.7–5.3)
RBC # BLD: 3.53 M/UL (ref 3.95–5.11)
SEG NEUTROPHILS: 54 % (ref 36–65)
SEGMENTED NEUTROPHILS ABSOLUTE COUNT: 1.7 K/UL (ref 1.5–8.1)
SODIUM BLD-SCNC: 143 MMOL/L (ref 135–144)
TOTAL PROTEIN: 5.9 G/DL (ref 6.4–8.3)
WBC # BLD: 3.1 K/UL (ref 3.5–11.3)

## 2022-10-11 PROCEDURE — 97530 THERAPEUTIC ACTIVITIES: CPT

## 2022-10-11 PROCEDURE — 94664 DEMO&/EVAL PT USE INHALER: CPT

## 2022-10-11 PROCEDURE — 2700000000 HC OXYGEN THERAPY PER DAY

## 2022-10-11 PROCEDURE — 94669 MECHANICAL CHEST WALL OSCILL: CPT

## 2022-10-11 PROCEDURE — 2580000003 HC RX 258: Performed by: INTERNAL MEDICINE

## 2022-10-11 PROCEDURE — 6360000002 HC RX W HCPCS: Performed by: INTERNAL MEDICINE

## 2022-10-11 PROCEDURE — 97110 THERAPEUTIC EXERCISES: CPT

## 2022-10-11 PROCEDURE — 94761 N-INVAS EAR/PLS OXIMETRY MLT: CPT

## 2022-10-11 PROCEDURE — 97116 GAIT TRAINING THERAPY: CPT

## 2022-10-11 PROCEDURE — 36415 COLL VENOUS BLD VENIPUNCTURE: CPT

## 2022-10-11 PROCEDURE — 6370000000 HC RX 637 (ALT 250 FOR IP): Performed by: INTERNAL MEDICINE

## 2022-10-11 PROCEDURE — 1200000000 HC SEMI PRIVATE

## 2022-10-11 PROCEDURE — 85025 COMPLETE CBC W/AUTO DIFF WBC: CPT

## 2022-10-11 PROCEDURE — 80053 COMPREHEN METABOLIC PANEL: CPT

## 2022-10-11 PROCEDURE — 94640 AIRWAY INHALATION TREATMENT: CPT

## 2022-10-11 PROCEDURE — 94660 CPAP INITIATION&MGMT: CPT

## 2022-10-11 RX ADMIN — ENOXAPARIN SODIUM 40 MG: 100 INJECTION SUBCUTANEOUS at 08:19

## 2022-10-11 RX ADMIN — IPRATROPIUM BROMIDE AND ALBUTEROL SULFATE 3 ML: 2.5; .5 SOLUTION RESPIRATORY (INHALATION) at 05:13

## 2022-10-11 RX ADMIN — IPRATROPIUM BROMIDE AND ALBUTEROL SULFATE 3 ML: 2.5; .5 SOLUTION RESPIRATORY (INHALATION) at 15:15

## 2022-10-11 RX ADMIN — HYDROCODONE BITARTRATE AND ACETAMINOPHEN 1 TABLET: 10; 325 TABLET ORAL at 07:00

## 2022-10-11 RX ADMIN — LEVOTHYROXINE SODIUM 200 MCG: 100 TABLET ORAL at 06:44

## 2022-10-11 RX ADMIN — SODIUM CHLORIDE, PRESERVATIVE FREE 10 ML: 5 INJECTION INTRAVENOUS at 20:44

## 2022-10-11 RX ADMIN — AMLODIPINE BESYLATE 10 MG: 10 TABLET ORAL at 08:18

## 2022-10-11 RX ADMIN — PIPERACILLIN AND TAZOBACTAM 4500 MG: 4; .5 INJECTION, POWDER, LYOPHILIZED, FOR SOLUTION INTRAVENOUS at 20:44

## 2022-10-11 RX ADMIN — Medication 1000 MG: at 08:19

## 2022-10-11 RX ADMIN — HYDROCODONE BITARTRATE AND ACETAMINOPHEN 1 TABLET: 10; 325 TABLET ORAL at 18:43

## 2022-10-11 RX ADMIN — Medication 1000 MG: at 20:45

## 2022-10-11 RX ADMIN — TIOTROPIUM BROMIDE AND OLODATEROL 2 PUFF: 3.124; 2.736 SPRAY, METERED RESPIRATORY (INHALATION) at 09:46

## 2022-10-11 RX ADMIN — PIPERACILLIN AND TAZOBACTAM 4500 MG: 4; .5 INJECTION, POWDER, LYOPHILIZED, FOR SOLUTION INTRAVENOUS at 12:13

## 2022-10-11 RX ADMIN — IPRATROPIUM BROMIDE AND ALBUTEROL SULFATE 3 ML: 2.5; .5 SOLUTION RESPIRATORY (INHALATION) at 20:24

## 2022-10-11 RX ADMIN — PIPERACILLIN AND TAZOBACTAM 4500 MG: 4; .5 INJECTION, POWDER, LYOPHILIZED, FOR SOLUTION INTRAVENOUS at 04:12

## 2022-10-11 RX ADMIN — SODIUM CHLORIDE, PRESERVATIVE FREE 10 ML: 5 INJECTION INTRAVENOUS at 09:09

## 2022-10-11 RX ADMIN — DULOXETINE HYDROCHLORIDE 60 MG: 60 CAPSULE, DELAYED RELEASE ORAL at 08:19

## 2022-10-11 RX ADMIN — POTASSIUM BICARBONATE 20 MEQ: 782 TABLET, EFFERVESCENT ORAL at 08:17

## 2022-10-11 RX ADMIN — FAMOTIDINE 20 MG: 20 TABLET ORAL at 20:46

## 2022-10-11 RX ADMIN — FAMOTIDINE 20 MG: 20 TABLET ORAL at 08:21

## 2022-10-11 RX ADMIN — PANTOPRAZOLE SODIUM 40 MG: 40 TABLET, DELAYED RELEASE ORAL at 06:44

## 2022-10-11 RX ADMIN — PREGABALIN 300 MG: 75 CAPSULE ORAL at 20:45

## 2022-10-11 RX ADMIN — SERTRALINE HYDROCHLORIDE 50 MG: 50 TABLET ORAL at 08:19

## 2022-10-11 RX ADMIN — POTASSIUM BICARBONATE 20 MEQ: 782 TABLET, EFFERVESCENT ORAL at 20:45

## 2022-10-11 RX ADMIN — TRAZODONE HYDROCHLORIDE 200 MG: 50 TABLET ORAL at 20:46

## 2022-10-11 RX ADMIN — PREGABALIN 300 MG: 75 CAPSULE ORAL at 08:17

## 2022-10-11 RX ADMIN — LEVOFLOXACIN 750 MG: 5 INJECTION, SOLUTION INTRAVENOUS at 13:04

## 2022-10-11 RX ADMIN — IPRATROPIUM BROMIDE AND ALBUTEROL SULFATE 3 ML: 2.5; .5 SOLUTION RESPIRATORY (INHALATION) at 09:46

## 2022-10-11 RX ADMIN — HYDROXYCHLOROQUINE SULFATE 300 MG: 200 TABLET ORAL at 08:18

## 2022-10-11 ASSESSMENT — PAIN DESCRIPTION - ORIENTATION
ORIENTATION: LOWER
ORIENTATION: LOWER

## 2022-10-11 ASSESSMENT — PAIN - FUNCTIONAL ASSESSMENT
PAIN_FUNCTIONAL_ASSESSMENT: ACTIVITIES ARE NOT PREVENTED

## 2022-10-11 ASSESSMENT — PAIN SCALES - GENERAL
PAINLEVEL_OUTOF10: 9
PAINLEVEL_OUTOF10: 8
PAINLEVEL_OUTOF10: 8

## 2022-10-11 ASSESSMENT — PAIN DESCRIPTION - PAIN TYPE
TYPE: CHRONIC PAIN

## 2022-10-11 ASSESSMENT — PAIN DESCRIPTION - ONSET
ONSET: ON-GOING
ONSET: ON-GOING

## 2022-10-11 ASSESSMENT — PAIN DESCRIPTION - FREQUENCY
FREQUENCY: CONTINUOUS
FREQUENCY: CONTINUOUS

## 2022-10-11 ASSESSMENT — PAIN DESCRIPTION - LOCATION
LOCATION: BACK;LEG

## 2022-10-11 ASSESSMENT — PAIN DESCRIPTION - DESCRIPTORS
DESCRIPTORS: DISCOMFORT

## 2022-10-11 NOTE — PROGRESS NOTES
Vitals and assessment completed as charted. Patient resting in bed with eyes closed when writer entered room. Patient did have fine crackles to bilateral bases but cleared when patient coughed. Patient was placed on BiPAP and will let writer know when she wants it of. Patient denies any further needs. Call light within reach and bed alarm engaged for safety.

## 2022-10-11 NOTE — PROGRESS NOTES
Physical Therapy  Facility/Department: [facility] AT THE [facility]  Daily Treatment Note  NAME: Brayan Lopez  : 1945  MRN: [id number]    Date of Service: 10/11/2022    Discharge Recommendations:  Continue to assess pending progress, Subacute/Skilled Nursing Facility        Patient Diagnosis(es): The primary encounter diagnosis was Pneumonia due to infectious organism, unspecified laterality, unspecified part of lung. Diagnoses of CO2 retention and Confusion were also pertinent to this visit. Assessment   Assessment: Pt completed BLE 2x15-20 reps in all planes, Multiple sit to stnad transfers Min/Mod A Rest breaks provided secondary to SOB and fatigue. once standing Pt with increased unsteadiness and weakness in BLE. Activity Tolerance: Patient tolerated treatment well;Patient limited by fatigue;Patient limited by endurance     Plan    Physcial Therapy Plan  General Plan: 2 times a day 7 days a week  Current Treatment Recommendations: Strengthening;Balance training;Functional mobility training;Transfer training;ADL/Self-care training;Neuromuscular re-education;Gait training; Safety education & training;Patient/Caregiver education & training     Restrictions  Restrictions/Precautions  Restrictions/Precautions: General Precautions  Required Braces or Orthoses?: No     Subjective    Subjective  Subjective: Pt in recliner and agreeable to therapy  Pain: 9/10 back and 7/10 BLE  Orientation  Overall Orientation Status: Within Normal Limits     Objective   Vitals     Bed Mobility Training  Bed Mobility Training: No  Overall Level of Assistance: Minimum assistance  Interventions: Verbal cues  Rolling: Minimum assistance  Supine to Sit: Minimum assistance  Scooting: Minimum assistance  Transfer Training  Transfer Training: Yes  Overall Level of Assistance: Minimum assistance; Moderate assistance  Interventions: Verbal cues  Sit to Stand: Minimum assistance; Moderate assistance  Stand to Sit: Minimum assistance; Moderate assistance  Gait Training  Gait Training: No  Gait  Overall Level of Assistance: Minimum assistance;Assist X2  Interventions: Verbal cues  Base of Support: Widened  Speed/Helen: Slow;Shuffled  Step Length: Right shortened;Left shortened  Gait Abnormalities: Antalgic  Distance (ft):  (5ft)  Assistive Device: Walker, rolling     PT Exercises  Exercise Treatment: seated BLE 2x15-20 reps     Safety Devices  Type of Devices: Call light within reach; Left in chair;Nurse notified       Goals  Short Term Goals  Time Frame for Short Term Goals: 3 DAYS. Short Term Goal 1: CGA GAIT 50 FT FWW  Short Term Goal 2: SBA TRANSFERS AND SBA BED MOBILITY. Long Term Goals  Time Frame for Long Term Goals : 4 WEEKS  Long Term Goal 1: IND GAIT  FT,  Long Term Goal 2: IND TRANSFERS  Patient Goals   Patient Goals : RETURN HOME WITH ASSIST FROM .     Education  Patient Education  Education Given To: Patient  Education Provided: Role of Therapy;Transfer Training  Education Method: Verbal  Barriers to Learning: None  Education Outcome: Verbalized understanding    Therapy Time   Individual Concurrent Group Co-treatment   Time In 1107         Time Out 1130         Minutes [address] [place], [place]

## 2022-10-11 NOTE — PROGRESS NOTES
Patient resting in bed with eyes closed with BiPAP on, and no distress noted. Call light remains within reach and bed alarm remains engaged for safety.

## 2022-10-11 NOTE — PLAN OF CARE
Up in chair eating lunch with visitors in room. Offered patient a bath after she is done eating. Declined a bath stating she just had one last night and hasn't gotten dirty.  Hakeem Story RN

## 2022-10-11 NOTE — PROGRESS NOTES
Writer at bedside to complete morning assessment and vitals. Patient awake and in chair. Assessment and vitals complete. Patient denies any other needs at this time. Call light, chair pad alarm active, bedside table and personal belongings within reach. Will continue to monitor. range of motion is not limited and there is no muscle tenderness.

## 2022-10-11 NOTE — PROGRESS NOTES
Physician Progress Note      PATIENT:               Nancy Whitaker  CSN #:                  [id number]  :                       1945  ADMIT DATE:       10/8/2022 10:06 AM  DISCH DATE:  RESPONDING  PROVIDER #:        Lisa Swan MD          QUERY TEXT:    Pt admitted with sepsis due to pneumonia. Pt noted to have lethargy and   disorientation. If possible, please document in the progress notes and discharge summary if   you are evaluating and / or treating any of the following: The medical record reflects the following:  Risk Factors: sepsis due to pneumonia,  acute on chronic respiratory failure ,   COPD, pulmonary fibrosis  Clinical Indicators: per H&P: She was unable to give any history in the ER but   is more awake and alert this AM. ? Per Nursing flowsheet admission day:    lethargic, oriented to person, disoriented to time, place, situation;  initial   VBGs:  pcO2 60.7,  pO2 42.5 on BiPAP  Treatment: IVF, IV Levaquin, IV Zosyn, [address] [facility], MSN, RN, CCDS, [facility]  Clinical   [phone]  Options provided:  -- Metabolic encephalopathy  -- Septic encephalopathy  -- Toxic encephalopathy  -- Toxic metabolic encephalopathy  -- Hypercapnic encephalopathy  -- Other - I will add my own diagnosis  -- Disagree - Not applicable / Not valid  -- Disagree - Clinically unable to determine / Unknown  -- Refer to Clinical Documentation Reviewer    PROVIDER RESPONSE TEXT:    This patient has toxic metabolic encephalopathy.     Query created by: Alma Forte on 10/10/2022 5:01 PM      Electronically signed by:  Lisa Swan MD 10/11/2022 7:46 AM

## 2022-10-11 NOTE — PROGRESS NOTES
Occupational Therapy  Facility/Department: [facility] AT THE [facility]  Daily Treatment Note  NAME: Gael Ann  : 1945  MRN: [id number]    Date of Service: 10/11/2022    Discharge Recommendations:  Continue to assess pending progress, Subacute/Skilled Nursing Facility, Home with Home health OT         Patient Diagnosis(es): The primary encounter diagnosis was Pneumonia due to infectious organism, unspecified laterality, unspecified part of lung. Diagnoses of CO2 retention and Confusion were also pertinent to this visit. Assessment    Activity Tolerance: Patient tolerated treatment well  Discharge Recommendations: Continue to assess pending progress;Subacute/Skilled Nursing Facility;Home with Home health OT      Plan   Occupational Therapy Plan  Times Per Week: 7  Times Per Day: Once a day  Current Treatment Recommendations: Strengthening; Functional mobility training;Self-Care / ADL; Safety education & training; Endurance training     Restrictions  Restrictions/Precautions  Restrictions/Precautions: General Precautions    Subjective   Subjective  Subjective: Pt sitting up in recliner upon arrival. Pt agreed to participate in therapy session this date. Pain: Pt had no complaints of pain this date. Orientation  Overall Orientation Status: Within Normal Limits  Pain: 9/10 back and 7/10 BLE           Objective    Vitals          OT Exercises  Exercise Treatment: Pt tolerated BUE ther ex with 1# dumbbell x 7 planes x 15 reps x 1 set, yellow flex bar x 3 variations x 15 reps x 1 set to increase UE strength and endurance in order to ease completion of ADL tasks. Pt required RBs as needed secondary to fatigue. Safety Devices  Type of Devices: Call light within reach; Left in chair     Patient Education  Education Given To: Patient  Education Provided: Role of Therapy;Plan of Care  Education Method: Verbal  Barriers to Learning: None  Education Outcome: Verbalized understanding    Goals  Short Term Goals  Time Frame for Short Term Goals: 20 visits  Short Term Goal 1: Patient to tolerate 15 minutes ther-ex/ther-act to increase ease of ADL participation. Short Term Goal 2: Patient to complete standing sinkside ADLs with SBA. Short Term Goal 3: Patient to complete LB bathing/dressing with SBA. Short Term Goal 4: Pt will be educated on EC strategies and breathing techniques to decrease fatigue and improve participation in ADL's.        Therapy Time   Individual Concurrent Group Co-treatment   Time In 1356         Time Out 1419         Minutes 23                 EB Mcdonough/BOB

## 2022-10-11 NOTE — PROGRESS NOTES
Physical Therapy  Facility/Department: [facility] AT THE [facility]  Daily Treatment Note  NAME: Yan Serrano  : 1945  MRN: [id number]  Date of Service: 10/11/2022  Discharge Recommendations:  Continue to assess pending progress, Subacute/Skilled Nursing Facility   Patient Diagnosis(es): The primary encounter diagnosis was Pneumonia due to infectious organism, unspecified laterality, unspecified part of lung. Diagnoses of CO2 retention and Confusion were also pertinent to this visit. Assessment   Assessment: Pt. fatigues easily requiring frequent RB. Pt. able to ambulate 5 ft x1 with Min A x2, noted increased shakiness and weakness in LEs. Bed mobility:CGA/Min  A. TransferS:Min/Mod A. Supine exercises B LE x15  Activity Tolerance: Patient tolerated treatment well;Patient limited by fatigue;Patient limited by endurance   Plan    Physcial Therapy Plan  General Plan: 2 times a day 7 days a week  Current Treatment Recommendations: Strengthening;Balance training;Functional mobility training;Transfer training;ADL/Self-care training;Neuromuscular re-education;Gait training; Safety education & training;Patient/Caregiver education & training   Restrictions  Restrictions/Precautions  Restrictions/Precautions: General Precautions  Required Braces or Orthoses?: No   Subjective    Subjective  Subjective: Pt. in bed upon arrival, agreeable to therapy. Stated \" I feel like I have been running a race. \"  Pain: chronic back pain 8/10  Orientation  Overall Orientation Status: Within Normal Limits   Objective   Bed Mobility Training  Bed Mobility Training: Yes  Overall Level of Assistance: Minimum assistance  Interventions: Verbal cues  Rolling: Minimum assistance  Supine to Sit: Minimum assistance  Scooting: Minimum assistance  Transfer Training  Transfer Training: Yes  Overall Level of Assistance: Minimum assistance; Moderate assistance  Interventions: Verbal cues  Sit to Stand: Minimum assistance; Moderate assistance  Stand to Sit: Minimum assistance; Moderate assistance  Gait Training  Gait Training: Yes  Gait  Overall Level of Assistance: Minimum assistance;Assist X2  Interventions: Verbal cues  Base of Support: Widened  Speed/Helen: Slow;Shuffled  Step Length: Right shortened;Left shortened  Gait Abnormalities: Antalgic  Distance (ft):  (5ft)  Assistive Device: Walker, rolling  PT Exercises  Exercise Treatment: Supine exercises B LE x15  Safety Devices  Type of Devices: Call light within reach; Left in chair;Nurse notified   Goals  Short Term Goals  Time Frame for Short Term Goals: 3 DAYS. Short Term Goal 1: CGA GAIT 50 FT FWW  Short Term Goal 2: SBA TRANSFERS AND SBA BED MOBILITY. Long Term Goals  Time Frame for Long Term Goals : 4 WEEKS  Long Term Goal 1: IND GAIT  FT,  Long Term Goal 2: IND TRANSFERS  Patient Goals   Patient Goals : RETURN HOME WITH ASSIST FROM .   Education  Patient Education  Education Given To: Patient  Education Provided: Role of Therapy;Transfer Training  Education Method: Verbal  Barriers to Learning: None  Education Outcome: Verbalized understanding  Therapy Time   Individual Concurrent Group Co-treatment   Time In 0830         Time Out 0900         Minutes 30           [place], [place]

## 2022-10-11 NOTE — PROGRESS NOTES
RESPIRATORY ASSESSMENT PROTOCOL                                                                                              Patient Name: Rupehs Mayo Room#: S619/[id number] : 1945     Admitting diagnosis: Confusion [R41.0]  CO2 retention [E87.29]  Sepsis due to pneumonia ([facility] [J18.9, A41.9]  Pneumonia due to infectious organism, unspecified laterality, unspecified part of lung [J18.9]       Medical History:   Past Medical History:   Diagnosis Date    A-fib ([facility]     Biventricular congestive heart failure ([facility]     Bronchiectasis with acute exacerbation ([facility] 2022    CAD (coronary artery disease)     Chronic pain syndrome     dilaudid infusion pump    COPD (chronic obstructive pulmonary disease) ([facility])     Depression     GERD (gastroesophageal reflux disease)     Hypothyroidism     Impaired fasting glucose     Iron deficiency anemia     Osteoporosis     Pulmonary fibrosis ([facility] 2022    Subdural hematoma ([facility] 2022       PATIENT ASSESSMENT    LABORATORY DATA  Hematology:   Lab Results   Component Value Date/Time    WBC 3.1 10/11/2022 05:20 AM    RBC 3.53 10/11/2022 05:20 AM    HGB 10.9 10/11/2022 05:20 AM    HCT 34.1 10/11/2022 05:20 AM     10/11/2022 05:20 AM     Chemistry:    Lab Results   Component Value Date/Time    PHART 7.332 10/10/2022 05:30 AM    JNZ2QXN 58.9 10/10/2022 05:30 AM    PO2ART 92.5 10/10/2022 05:30 AM    K1QZWKQH 96.4 10/10/2022 05:30 AM    XCP6UJW 30.5 10/10/2022 05:30 AM    PBEA 2.9 10/10/2022 05:30 AM       VITALS  Heart Rate: 94   Resp: 17  BP: (!) 146/67  SpO2: 96 % O2 Device: Nasal cannula  Temp: 97.2 °F (36.2 °C)    SKIN COLOR  [x] Normal  [] Pale  [] Dusky  [] Cyanotic    RESPIRATORY PATTERN  [x] Normal  [] Dyspnea  [] Cheyne-Granger  [] Kussmaul  [] Biots    AMBULATORY  [] Yes  [] No  [x] With Assistance    PEAK FLOW  Predicted:     Personal Best:        VITAL CAPACITY  Predicted value:  ml  Actual Value:  ml  30% of Predicted:  ml  Patient Acuity 0 1 2 3 4 Score   Level of Concious (LOC) [x]  Alert & Oriented or Pt normal LOC []  Confused;follows directions []  Confused & uncooper-ative []  Obtunded []  Comatose 0   Respiratory Rate  (RR) [x]  Reg. rate & pattern. 12 - 20 bpm  []  Increased RR. Greater than 20 bpm   []  SOB w/ exertion or RR greater than 24 bpm []  Access- ory muscle use at rest. Abn.  resp. []  SOB at rest.   0   Bilateral Breath Sounds (BBS) []  Clear [x]  Diminish-ed bases  []  Diminish-ed t/o, or rales   []  Sporadic, scattered wheezes or rhonchi []  Persistentwheezes and, or absent BBS 1   Cough []  Strong, effective, & non-prod. [x]  Effective & prod. Less than 25 ml (2 TBSP) over past 24 hrs []  Ineffective & non-prod to less than 25 ML over past 24 hrs []  Ineffective and, or greater than 25 ml sputum prod. past 24 hrs. []  Nonspon- taneous; Requires suctioning 1   Pulmonary History  (PULM HX) []  No smoking and no chronic pulmonaryhistory []  Former smoker. Quit over 12 mos. ago []  Current smoker or quit w/ in 12 mos []  Pulm. History and, or 20 pk/yr smoking hx [x]  Admitted w/ acute pulm. dx and, or has been admitted w/ pulm. dx 2 or more times over past 12 mos 4   Surgical History this Admit  (SURG HX) [x]  No surgery []  General surgery []  Lower abdominal []  Thoracic or upper abdominal   []  Thoracic w/ pulm. disease 0   Chest X-Ray (CXR)/CT Scan []  Clear or not applicable []  Not available []  Atelect- asis or pleural effusions []  Localized infiltrate or pulm. edema [x]  Con-solidated Infiltrates, bilateral, or in more than 1 lobe 4   Slow or Forced VC, FEV1 OR PEFR (PULM FXN)  [x]  80% or greater, or not indicated []  Pt. unable to perform []  FEV1 or PEFR or VC 51-79%.   []  FEV1 or PEFR or VC  30-49%   []  FEV1 or PEFR or VC less than 30%   0   TOTAL ACUITY: 10       CARE PLAN    If Acuity Level is 2, 3, or 4 in any of the following:    [] BILATERAL BREATH SOUNDS (BBS)     [x] PULMONARY HISTORY (PULM HX)  [] PULMONARY FUNCTION (PULM FX)    Goal: Improve respiratory functions in patients with airway disease and decrease WOB    [x] AEROSOL PROTOCOL    Total Acuity:   16-32  []  Secondary Assessment in 24 hrs Total Acuity:  9-15  [x]  Secondary Assessment in 24 hrs Total Acuity:  4-8  []  Secondary Assessment in 48 hrs Total Acuity:  0-3  []  Secondary Assessment in 72 hrs   HHN AEROSOL THERAPY with  [physician-ordered bronchodilator(s)] q 4 & Albuterol PRN q2 hrs. Breath-Actuated Neb if BBS Acuity = 4, and pt. can use MP. Notify physician if condition deteriorates. HHN AEROSOL THERAPY with  [physician-ordered bronchodilator(s)]  QID and Albuterol PRN q4 hrs. Breath-Actuated Neb if BBS Acuity = 4, and pt. can use MP. Notify physician if condition deteriorates. MDI THERAPY with  2 actuations of [physician-ordered bronchodilator(s)] via spacer TID Albuterol and PRNq4 hrs. If unable to utilize MDI: HHN [physician-ordered bronchodilator(s)] TID and Albuterol PRN q4 hrs. Notify physician if condition deteriorates. MDI THERAPY with  [physician-ordered bronchodilator(s)] via spacer TID PRN. If unable to utilize MDI: HHN [physician-ordered bronchodilator(s)] TID PRN. Notify physician if condition deteriorates. If Acuity Level is 2, 3, or 4 in any of the following:    [] COUGH     [] SURGICAL HISTORY (SURG HX)  [x] CHEST XRAY (CXR)    Goal: Improvement in sputum mobilization in patients with ineffective airway clearance. Reverse atelectasis.     [x] Bronchopulmonary Hygiene Protocol    Total Acuity:   16-32  []  Secondary Assessment in 24 hrs Total Acuity:  9-15  [x]  Secondary Assessment in 24 hrs Total Acuity:  4-8  []  Secondary Assessment in 48 hrs Total Acuity:  0-3  []  Secondary Assessment in 72 hrs   METANEB QID with [physician-ordered bronchodilator(s)] if CXR Acuity = 4; otherwise:  PD&P, PEP, or Vest QID & PRN  NT Sxn PRN for ineffective cough  METANEB QID with [physician-ordered bronchodilator(s)] if CXR Acuity = 4; otherwise:  PD&P, PEP, or Vest TID & PRN  NT Sxn PRN for ineffective cough  Instruct patient to self-perform IS q1hr WA   Directed Cough self-performed q1hr WA     If Acuity Level is 2 or above in the following:    [] PULMONARY HISTORY (PULM HX)    Goal: Assist patient in quitting smoking to slow or stop the progression of lung disease.     [] Smoking Cessation Protocol    SMOKING CESSATION EDUCATION provided according to policy [id number]: (marlyn with an X)  ____Yes    ____ No     ____ NA    Smoking Cessation Booklet given:  ____Yes  ____No ____Patient Abimael Lopez

## 2022-10-11 NOTE — PLAN OF CARE
Problem: Discharge Planning  Goal: Discharge to home or other facility with appropriate resources  Outcome: Progressing  Flowsheets (Taken 10/11/2022 0328)  Discharge to home or other facility with appropriate resources:   Identify barriers to discharge with patient and caregiver   Arrange for needed discharge resources and transportation as appropriate   Identify discharge learning needs (meds, wound care, etc)     Problem: Safety - Adult  Goal: Free from fall injury  Outcome: Progressing  Flowsheets (Taken 10/11/2022 0328)  Free From Fall Injury: Instruct family/caregiver on patient safety     Problem: ABCDS Injury Assessment  Goal: Absence of physical injury  Outcome: Progressing  Flowsheets (Taken 10/11/2022 0328)  Absence of Physical Injury: Implement safety measures based on patient assessment     Problem: Skin/Tissue Integrity  Goal: Absence of new skin breakdown  Description: 1. Monitor for areas of redness and/or skin breakdown  2. Assess vascular access sites hourly  3. Every 4-6 hours minimum:  Change oxygen saturation probe site  4. Every 4-6 hours:  If on nasal continuous positive airway pressure, respiratory therapy assess nares and determine need for appliance change or resting period.   Outcome: Progressing     Problem: Pain  Goal: Verbalizes/displays adequate comfort level or baseline comfort level  Outcome: Progressing  Flowsheets (Taken 10/11/2022 0328)  Verbalizes/displays adequate comfort level or baseline comfort level:   Encourage patient to monitor pain and request assistance   Assess pain using appropriate pain scale     Problem: Nutrition Deficit:  Goal: Optimize nutritional status  Outcome: Progressing

## 2022-10-11 NOTE — PROGRESS NOTES
Vitals and assessment completed at this time. Patient was resting in bed with eyes closed with BiPAP on when writer entered room. Patient denies any needs at this time. Call light remains within reach and bed alarm engaged for safety.

## 2022-10-11 NOTE — PROGRESS NOTES
Writer put nasal canula on pt at 2L. Continuous pulse ox running between 87-90 on room air. Pt will call out when ready to go on BiPAP. Will continue to monitor.

## 2022-10-11 NOTE — PROGRESS NOTES
INTENSIVE CARE UNIT   APRN - Progress Note    Patient - Gael Ann  Date of Admission -  10/8/2022 10:06 AM  Date of Evaluation -  10/11/2022  Hospital Day - 3      SUBJECTIVE:     The Gael Ann is a 69 y.o. female who is seen for follow up in the ICU. Per nursing report and notes, overnight events include: no significant events. She resting in bed alert oriented no acute distress. She states she feels better. Her breathing is easier. Complained of leg swelling yesterday      ROS:   Constitutional: negative  for fevers, and negative for chills. Respiratory: positive for shortness of breath, negative for cough, and negative for wheezing  Cardiovascular: negative for chest pain, and negative for palpitations  Gastrointestinal: negative for abdominal pain, negative for nausea,negative for vomiting, negative for diarrhea, and negative for constipation    All other systems were reviewed with the patient and are negative unless otherwise stated in HPI.       OBJECTIVE:     VITAL SIGNS:  Patient Vitals for the past 8 hrs:   BP Temp Temp src Pulse Resp SpO2 Weight   10/11/22 0645 (!) 164/81 97.2 °F (36.2 °C) Tympanic 83 20 95 % --   10/11/22 0600 131/67 -- -- 67 10 96 % --   10/11/22 0514 -- -- -- -- -- 95 % --   10/11/22 0500 137/65 -- -- 64 14 95 % --   10/11/22 0435 -- -- -- -- -- 91 % --   10/11/22 0434 -- -- -- -- -- (!) 89 % --   10/11/22 0433 -- -- -- -- -- (!) 89 % --   10/11/22 0432 -- -- -- -- -- (!) 89 % --   10/11/22 0423 -- -- -- -- -- 91 % --   10/11/22 0422 -- -- -- -- -- (!) 88 % --   10/11/22 0400 (!) 163/86 -- -- 64 12 98 % 164 lb 0.4 oz (74.4 kg)   10/11/22 0300 132/69 97.6 °F (36.4 °C) Temporal 59 13 98 % --   10/11/22 0200 122/62 -- -- 61 12 98 % --   10/11/22 0100 137/68 -- -- 64 11 98 % --   10/11/22 0000 117/62 -- -- 63 14 98 % --         Temp: 97.2 °F (36.2 °C)  Temp range:    Temp  Av.8 °F (36.6 °C)  Min: 97.2 °F (36.2 °C)  Max: 98.6 °F (37 °C)    BP: (!) 164/81  BP Range: Systolic (25AUO), VZZ:262 , Min:105 , KCT:488      Diastolic (67SGD), BKF:49, Min:57, Max:113    Heart Rate: 83  Pulse Range:    Pulse  Av  Min: 59  Max: 117    Resp: 20  Resp Range:   Resp  Avg: 15.4  Min: 10  Max: 22    SpO2: 95 % on supplemental O2  SpO2 range:   SpO2  Av.3 %  Min: 88 %  Max: 98 %    Weight  Wt Readings from Last 3 Encounters:   10/11/22 164 lb 0.4 oz (74.4 kg)   22 143 lb (64.9 kg)   22 143 lb (64.9 kg)     Body mass index is 27.29 kg/m². 24HR INTAKE/OUTPUT:      Intake/Output Summary (Last 24 hours) at 10/11/2022 0751  Last data filed at 10/11/2022 0409  Gross per 24 hour   Intake 5617.85 ml   Output 4750 ml   Net 867.85 ml     Date 10/11/22 0000 - 10/11/22 2359   Shift 9683-2893 9440-2269 9687-2376 24 Hour Total   INTAKE   P.O. 200   200   I. V.(mL/kg/hr) 1285.9   1285.9   Shift Total(mL/kg) 1485. 9(20)   1485. 9(20)   OUTPUT   Urine(mL/kg/hr) 775   775   Shift Total(mL/kg) 775(10.4)   775(10.4)   Weight (kg) 74.4 74.4 74.4 74.4         PHYSICAL EXAM:  GEN:    Awake and following commands:     [] No   [x] Yes  MENTAL STATUS: alert and oriented x3. DISTRESS: Acute respiratory distress:       [x] No   [] Yes  EYES:  EOMI, pupils equal   NECK: Supple. No lymphadenopathy. No carotid bruit  CVS:    regular but tachycardic, no audible murmur  PULM: trace wheeze  no acute respiratory distress  ABD:    Bowels sounds normal.  Abdomen is soft. No distention. no tenderness to palpation. EXT:   trace edema bilaterally . No calf tenderness. NEURO: Moves all extremities. Motor and sensory are grossly intact  SKIN:  No rashes. No skin lesions.           MEDICATIONS:  Scheduled Meds:   amLODIPine  10 mg Oral Daily    DULoxetine  60 mg Oral Daily    hydroxychloroquine  300 mg Oral Daily    levETIRAcetam  1,000 mg Oral BID    levothyroxine  200 mcg Oral Daily    pantoprazole  40 mg Oral QAM AC    potassium bicarb-citric acid  20 mEq Oral BID    pregabalin  300 mg Oral BID sertraline  50 mg Oral Daily    traZODone  200 mg Oral Nightly    tiotropium-olodaterol  2 puff Inhalation Daily    sodium chloride flush  5-40 mL IntraVENous 2 times per day    famotidine  20 mg Oral BID    enoxaparin  40 mg SubCUTAneous Daily    piperacillin-tazobactam  4,500 mg IntraVENous Q8H    levofloxacin  750 mg IntraVENous Q24H    ipratropium-albuterol  3 mL Inhalation 4x daily     Continuous Infusions:   sodium chloride Stopped (10/11/22 0643)     PRN Meds:   guaiFENesin-dextromethorphan, 5 mL, Q4H PRN  HYDROcodone-acetaminophen, 1 tablet, Q6H PRN  Benzocaine-Menthol, 1 lozenge, Q2H PRN  ondansetron, 4 mg, Q8H PRN  sodium chloride flush, 5-40 mL, PRN  sodium chloride, 25 mL, PRN  acetaminophen, 650 mg, Q6H PRN   Or  acetaminophen, 650 mg, Q6H PRN  albuterol, 2.5 mg, Q4H PRN          VASOPRESSORS:    [x] No      [] Yes  [] Levophed      [] Dopamine     [] Vasopressin      [] Dobutamine     [] Phenylephrine     [] Epinephrine        DATA:  Complete Blood Count:   Recent Labs     10/09/22  0515 10/10/22  0510 10/11/22  0520   WBC 6.2 3.7 3.1*   RBC 3.39* 3.28* 3.53*   HGB 10.4* 10.1* 10.9*   HCT 32.5* 31.8* 34.1*   MCV 95.9 97.0 96.6   RDW 13.5 13.6 13.7    178 201   SEGS 71* 57 54   NEUTROABS 4.37 2.10 1.70   LYMPHOPCT 15* 23* 25   LYMPHSABS 0.94* 0.83* 0.78*   MONOPCT 11 13* 13*   EOSRELPCT 3 6* 7*   BASOPCT 1 1 1   IMMGRAN 0 0 0        Recent Blood Glucose:   Recent Labs     10/08/22  1101 10/09/22  0515 10/10/22  0510 10/11/22  0520   GLUCOSE 121* 96 94 91        Comprehensive Metabolic Profile:   Recent Labs     10/09/22  0515 10/10/22  0510 10/11/22  0520   BUN 10 7* 7*   CREATININE 0.53 0.46* 0.47*    143 143   K 3.8 3.7 3.6*    107 106   CALCIUM 8.5* 8.7 9.0   ANIONGAP 5* 7* 6*   CO2 31 29 31   PROT 5.4* 5.7* 5.9*   LABALBU 2.9* 3.1* 3.2*   BILITOT 0.4 0.2* 0.2*   ALKPHOS 72 68 70   AST 10 10 10   ALT 7 8 8        Urinalysis:   Lab Results   Component Value Date/Time    NITRU NEGATIVE 10/08/2022 11:24 AM    COLORU Yellow 10/08/2022 11:24 AM    PHUR 6.0 10/08/2022 11:24 AM    WBCUA 0 TO 2 09/09/2022 09:29 PM    RBCUA 0 TO 2 09/09/2022 09:29 PM    MUCUS NOT REPORTED 10/14/2021 08:07 PM    TRICHOMONAS NOT REPORTED 10/14/2021 08:07 PM    YEAST NOT REPORTED 10/14/2021 08:07 PM    BACTERIA 1+ 09/09/2022 09:29 PM    SPECGRAV 1.015 10/08/2022 11:24 AM    LEUKOCYTESUR NEGATIVE 10/08/2022 11:24 AM    UROBILINOGEN Normal 10/08/2022 11:24 AM    BILIRUBINUR NEGATIVE 10/08/2022 11:24 AM    GLUCOSEU NEGATIVE 10/08/2022 11:24 AM    KETUA NEGATIVE 10/08/2022 11:24 AM    AMORPHOUS 2+ 09/09/2022 09:29 PM       HgBA1c:    Lab Results   Component Value Date/Time    LABA1C 5.7 06/20/2022 10:00 AM       TSH:    Lab Results   Component Value Date/Time    TSH 11.11 06/20/2022 10:01 AM       Lactic Acid:   Lab Results   Component Value Date/Time    LACTA 1.9 04/28/2022 03:14 PM    LACTA 2.0 02/06/2022 10:51 AM    LACTA 2.5 02/05/2022 03:30 PM        High Sensitivity Troponin:  Recent Labs     10/08/22  1101   TROPHS 12     Pro-BNP:  Lab Results   Component Value Date    PROBNP 117 10/08/2022     D-Dimer:  Lab Results   Component Value Date    DDIMER 0.69 (H) 01/18/2022     PT/INR:    Lab Results   Component Value Date/Time    PROTIME 13.0 10/08/2022 11:01 AM    INR 1.0 10/08/2022 11:01 AM     PTT:    Lab Results   Component Value Date/Time    APTT 28.7 10/08/2022 11:01 AM       CRP: No results for input(s): CRP in the last 72 hours. ABGs:   Lab Results   Component Value Date/Time    PHART 7.332 10/10/2022 05:30 AM    EYG8MND 58.9 10/10/2022 05:30 AM    PO2ART 92.5 10/10/2022 05:30 AM    KNJ2NRR 30.5 10/10/2022 05:30 AM    BPH0ZRC 35 11/02/2020 04:16 AM    G3XGPTXY 96.4 10/10/2022 05:30 AM    FIO2 28 10/10/2022 05:30 AM         Radiology/Imaging:  CT Head W/O Contrast   Final Result   No acute process. No significant change in primarily low-density left   convexity extra-axial collection.          XR CHEST PORTABLE   Final Result   Suspected bilateral infectious/inflammatory airways process. ASSESSMENT / PLAN:     Sepsis due to pneumonia ([address]  Continue current therapy  Continue IV Zosyn  Continue IV Levaquin  Stop IV fluids  Trend labs-improving  Acute on chronic respiratory failure with hypoxia and hypercapnia  Trend ABGs-improving  Wean oxygen  CPAP intemittant  COPD/pulmonary fibrosis  Continue Anoro Ellipta, duo nebs  Hypertension  Continue amlodipine  Chronic pain syndrome  Continue Norco  Nutrition status:   at risk for malnutrition  Dietician consult initiated  [] NPO [] TPN      [] Tube feed [] Clear liquid        [] Full liquid [x] regular diet         [] Fluid restriction   [] Diabetic diet   ICU Prophylaxis:   DVT: Lovenox   Stress Ulcer: H2 Blocker   High risk medications: none   Disposition:    Transfer out of ICU:  [x] yes [] no   Discharge plan is SNF  Total critical care time caring for this patient with life threatening, unstable organ failure, including direct patient contact, management of life support systems, review of data including imaging and labs, discussions with other team members and physicians at least 0 minutes so far today, excluding separately billable procedures.       RYAN Baum - CNP , RYAN-NP-C  10/11/2022  7:51 AM

## 2022-10-11 NOTE — PROGRESS NOTES
Writer at bedside at this time to perform initial shift assessment. Patient is sitting up at this time. Vital signs taken and assessment completed at this time. Patient is alert and oriented x4 and has complaint of pain 9/10 in her back and legs that is chronic.; PRN Norco given at this time. Patient denies any further needs at this time. Call light within reach, bed alarm on for safety, will continue to monitor.

## 2022-10-12 LAB
ABSOLUTE EOS #: 0.24 K/UL (ref 0–0.44)
ABSOLUTE IMMATURE GRANULOCYTE: <0.03 K/UL (ref 0–0.3)
ABSOLUTE LYMPH #: 0.96 K/UL (ref 1.1–3.7)
ABSOLUTE MONO #: 0.47 K/UL (ref 0.1–1.2)
ALBUMIN SERPL-MCNC: 3.6 G/DL (ref 3.5–5.2)
ALBUMIN/GLOBULIN RATIO: 1.2 (ref 1–2.5)
ALP BLD-CCNC: 81 U/L (ref 35–104)
ALT SERPL-CCNC: 10 U/L (ref 5–33)
ANION GAP SERPL CALCULATED.3IONS-SCNC: 9 MMOL/L (ref 9–17)
AST SERPL-CCNC: 13 U/L
BASOPHILS # BLD: 1 % (ref 0–2)
BASOPHILS ABSOLUTE: 0.03 K/UL (ref 0–0.2)
BILIRUB SERPL-MCNC: 0.3 MG/DL (ref 0.3–1.2)
BUN BLDV-MCNC: 8 MG/DL (ref 8–23)
BUN/CREAT BLD: 16 (ref 9–20)
CALCIUM SERPL-MCNC: 9.7 MG/DL (ref 8.6–10.4)
CHLORIDE BLD-SCNC: 99 MMOL/L (ref 98–107)
CO2: 35 MMOL/L (ref 20–31)
CREAT SERPL-MCNC: 0.51 MG/DL (ref 0.5–0.9)
EOSINOPHILS RELATIVE PERCENT: 7 % (ref 1–4)
GFR SERPL CREATININE-BSD FRML MDRD: >60 ML/MIN/1.73M2
GLUCOSE BLD-MCNC: 86 MG/DL (ref 70–99)
HCT VFR BLD CALC: 38.2 % (ref 36.3–47.1)
HEMOGLOBIN: 12 G/DL (ref 11.9–15.1)
IMMATURE GRANULOCYTES: 0 %
LYMPHOCYTES # BLD: 27 % (ref 24–43)
MCH RBC QN AUTO: 30.5 PG (ref 25.2–33.5)
MCHC RBC AUTO-ENTMCNC: 31.4 G/DL (ref 28.4–34.8)
MCV RBC AUTO: 97 FL (ref 82.6–102.9)
MONOCYTES # BLD: 13 % (ref 3–12)
NRBC AUTOMATED: 0 PER 100 WBC
PDW BLD-RTO: 13.2 % (ref 11.8–14.4)
PLATELET # BLD: 209 K/UL (ref 138–453)
PMV BLD AUTO: 10 FL (ref 8.1–13.5)
POTASSIUM SERPL-SCNC: 4 MMOL/L (ref 3.7–5.3)
RBC # BLD: 3.94 M/UL (ref 3.95–5.11)
SEG NEUTROPHILS: 52 % (ref 36–65)
SEGMENTED NEUTROPHILS ABSOLUTE COUNT: 1.9 K/UL (ref 1.5–8.1)
SODIUM BLD-SCNC: 143 MMOL/L (ref 135–144)
TOTAL PROTEIN: 6.6 G/DL (ref 6.4–8.3)
WBC # BLD: 3.6 K/UL (ref 3.5–11.3)

## 2022-10-12 PROCEDURE — 94640 AIRWAY INHALATION TREATMENT: CPT

## 2022-10-12 PROCEDURE — 6360000002 HC RX W HCPCS: Performed by: INTERNAL MEDICINE

## 2022-10-12 PROCEDURE — 2580000003 HC RX 258: Performed by: INTERNAL MEDICINE

## 2022-10-12 PROCEDURE — 94664 DEMO&/EVAL PT USE INHALER: CPT

## 2022-10-12 PROCEDURE — 36415 COLL VENOUS BLD VENIPUNCTURE: CPT

## 2022-10-12 PROCEDURE — 6360000002 HC RX W HCPCS: Performed by: NURSE PRACTITIONER

## 2022-10-12 PROCEDURE — 94660 CPAP INITIATION&MGMT: CPT

## 2022-10-12 PROCEDURE — 6370000000 HC RX 637 (ALT 250 FOR IP): Performed by: NURSE PRACTITIONER

## 2022-10-12 PROCEDURE — 1200000000 HC SEMI PRIVATE

## 2022-10-12 PROCEDURE — 94761 N-INVAS EAR/PLS OXIMETRY MLT: CPT

## 2022-10-12 PROCEDURE — 97530 THERAPEUTIC ACTIVITIES: CPT

## 2022-10-12 PROCEDURE — 92610 EVALUATE SWALLOWING FUNCTION: CPT

## 2022-10-12 PROCEDURE — 80053 COMPREHEN METABOLIC PANEL: CPT

## 2022-10-12 PROCEDURE — 85025 COMPLETE CBC W/AUTO DIFF WBC: CPT

## 2022-10-12 PROCEDURE — 94669 MECHANICAL CHEST WALL OSCILL: CPT

## 2022-10-12 PROCEDURE — 97110 THERAPEUTIC EXERCISES: CPT

## 2022-10-12 PROCEDURE — 6370000000 HC RX 637 (ALT 250 FOR IP): Performed by: INTERNAL MEDICINE

## 2022-10-12 PROCEDURE — 97116 GAIT TRAINING THERAPY: CPT

## 2022-10-12 PROCEDURE — 2700000000 HC OXYGEN THERAPY PER DAY

## 2022-10-12 PROCEDURE — 97535 SELF CARE MNGMENT TRAINING: CPT

## 2022-10-12 RX ORDER — ACETYLCYSTEINE 200 MG/ML
600 SOLUTION ORAL; RESPIRATORY (INHALATION) ONCE
Status: COMPLETED | OUTPATIENT
Start: 2022-10-12 | End: 2022-10-12

## 2022-10-12 RX ORDER — POLYETHYLENE GLYCOL 3350 17 G/17G
17 POWDER, FOR SOLUTION ORAL DAILY PRN
Status: DISCONTINUED | OUTPATIENT
Start: 2022-10-12 | End: 2022-10-18 | Stop reason: HOSPADM

## 2022-10-12 RX ADMIN — FAMOTIDINE 20 MG: 20 TABLET ORAL at 09:51

## 2022-10-12 RX ADMIN — AMLODIPINE BESYLATE 10 MG: 10 TABLET ORAL at 09:51

## 2022-10-12 RX ADMIN — Medication 1000 MG: at 09:51

## 2022-10-12 RX ADMIN — PREGABALIN 300 MG: 75 CAPSULE ORAL at 20:42

## 2022-10-12 RX ADMIN — LEVOFLOXACIN 750 MG: 5 INJECTION, SOLUTION INTRAVENOUS at 12:31

## 2022-10-12 RX ADMIN — HYDROXYCHLOROQUINE SULFATE 300 MG: 200 TABLET ORAL at 09:52

## 2022-10-12 RX ADMIN — PANTOPRAZOLE SODIUM 40 MG: 40 TABLET, DELAYED RELEASE ORAL at 07:24

## 2022-10-12 RX ADMIN — SERTRALINE HYDROCHLORIDE 50 MG: 50 TABLET ORAL at 09:52

## 2022-10-12 RX ADMIN — PIPERACILLIN AND TAZOBACTAM 4500 MG: 4; .5 INJECTION, POWDER, LYOPHILIZED, FOR SOLUTION INTRAVENOUS at 04:05

## 2022-10-12 RX ADMIN — FAMOTIDINE 20 MG: 20 TABLET ORAL at 20:42

## 2022-10-12 RX ADMIN — ENOXAPARIN SODIUM 40 MG: 100 INJECTION SUBCUTANEOUS at 09:51

## 2022-10-12 RX ADMIN — IPRATROPIUM BROMIDE AND ALBUTEROL SULFATE 3 ML: 2.5; .5 SOLUTION RESPIRATORY (INHALATION) at 05:16

## 2022-10-12 RX ADMIN — PIPERACILLIN AND TAZOBACTAM 4500 MG: 4; .5 INJECTION, POWDER, LYOPHILIZED, FOR SOLUTION INTRAVENOUS at 20:48

## 2022-10-12 RX ADMIN — Medication 1000 MG: at 20:42

## 2022-10-12 RX ADMIN — DULOXETINE HYDROCHLORIDE 60 MG: 60 CAPSULE, DELAYED RELEASE ORAL at 09:52

## 2022-10-12 RX ADMIN — PREGABALIN 300 MG: 75 CAPSULE ORAL at 09:51

## 2022-10-12 RX ADMIN — SODIUM CHLORIDE, PRESERVATIVE FREE 10 ML: 5 INJECTION INTRAVENOUS at 20:50

## 2022-10-12 RX ADMIN — SODIUM CHLORIDE, PRESERVATIVE FREE 10 ML: 5 INJECTION INTRAVENOUS at 12:34

## 2022-10-12 RX ADMIN — LEVOTHYROXINE SODIUM 200 MCG: 100 TABLET ORAL at 07:24

## 2022-10-12 RX ADMIN — IPRATROPIUM BROMIDE AND ALBUTEROL SULFATE 3 ML: 2.5; .5 SOLUTION RESPIRATORY (INHALATION) at 15:45

## 2022-10-12 RX ADMIN — POTASSIUM BICARBONATE 20 MEQ: 782 TABLET, EFFERVESCENT ORAL at 20:42

## 2022-10-12 RX ADMIN — HYDROCODONE BITARTRATE AND ACETAMINOPHEN 1 TABLET: 10; 325 TABLET ORAL at 21:27

## 2022-10-12 RX ADMIN — TRAZODONE HYDROCHLORIDE 200 MG: 50 TABLET ORAL at 20:42

## 2022-10-12 RX ADMIN — IPRATROPIUM BROMIDE AND ALBUTEROL SULFATE 3 ML: 2.5; .5 SOLUTION RESPIRATORY (INHALATION) at 10:07

## 2022-10-12 RX ADMIN — PIPERACILLIN AND TAZOBACTAM 4500 MG: 4; .5 INJECTION, POWDER, LYOPHILIZED, FOR SOLUTION INTRAVENOUS at 15:08

## 2022-10-12 RX ADMIN — IPRATROPIUM BROMIDE AND ALBUTEROL SULFATE 3 ML: 2.5; .5 SOLUTION RESPIRATORY (INHALATION) at 20:10

## 2022-10-12 RX ADMIN — HYDROCODONE BITARTRATE AND ACETAMINOPHEN 1 TABLET: 10; 325 TABLET ORAL at 07:38

## 2022-10-12 RX ADMIN — ACETYLCYSTEINE 600 MG: 200 SOLUTION ORAL; RESPIRATORY (INHALATION) at 10:07

## 2022-10-12 RX ADMIN — POTASSIUM BICARBONATE 20 MEQ: 782 TABLET, EFFERVESCENT ORAL at 09:51

## 2022-10-12 RX ADMIN — POLYETHYLENE GLYCOL 3350 17 G: 17 POWDER, FOR SOLUTION ORAL at 15:09

## 2022-10-12 RX ADMIN — TIOTROPIUM BROMIDE AND OLODATEROL 2 PUFF: 3.124; 2.736 SPRAY, METERED RESPIRATORY (INHALATION) at 10:08

## 2022-10-12 ASSESSMENT — PAIN DESCRIPTION - ORIENTATION
ORIENTATION: LOWER

## 2022-10-12 ASSESSMENT — PAIN SCALES - GENERAL
PAINLEVEL_OUTOF10: 9
PAINLEVEL_OUTOF10: 8

## 2022-10-12 ASSESSMENT — PAIN - FUNCTIONAL ASSESSMENT
PAIN_FUNCTIONAL_ASSESSMENT: ACTIVITIES ARE NOT PREVENTED

## 2022-10-12 ASSESSMENT — PAIN DESCRIPTION - PAIN TYPE
TYPE: CHRONIC PAIN

## 2022-10-12 ASSESSMENT — PAIN DESCRIPTION - FREQUENCY
FREQUENCY: CONTINUOUS

## 2022-10-12 ASSESSMENT — PAIN DESCRIPTION - ONSET
ONSET: ON-GOING

## 2022-10-12 ASSESSMENT — PAIN DESCRIPTION - DESCRIPTORS
DESCRIPTORS: DISCOMFORT
DESCRIPTORS: ACHING

## 2022-10-12 ASSESSMENT — PAIN DESCRIPTION - LOCATION
LOCATION: BACK;LEG

## 2022-10-12 NOTE — PROGRESS NOTES
Pt vitals and assessment completed at this time, see flowsheet. Pt A&O x4, breathing regular, even, with dyspnea on exertion. Pt has wheezing and rhonchi on left upper lobe and rhonchi on left lower lobe, right side diminished. Pt says pain is 8/10 in lower back with additional pain in the legs, prn pain med given. Pt denies any further needs at this time, call light within reach, will continue to monitor.

## 2022-10-12 NOTE — PROGRESS NOTES
Physical Therapy  Facility/Department: [facility] AT THE [facility] MED SURG  Daily Treatment Note  NAME: Arianna Chen  : 1945  MRN: [id number]  Date of Service: 10/12/2022  Discharge Recommendations:  Continue to assess pending progress, Subacute/Skilled Nursing Facility   Patient Diagnosis(es): The primary encounter diagnosis was Pneumonia due to infectious organism, unspecified laterality, unspecified part of lung. Diagnoses of CO2 retention and Confusion were also pertinent to this visit. Assessment   Assessment: Noted pt. has increased instabiliaty with standing and weakness. Pt. requried Min A to maintain standing balance d/t LE weakness. STS x5 with Min A. Gait with [employer], Min A 5ft with increased time to complete ambulation. Activity Tolerance: Patient tolerated treatment well;Patient limited by endurance; Patient limited by fatigue   Plan    Physcial Therapy Plan  General Plan: 2 times a day 7 days a week  Current Treatment Recommendations: Strengthening;Balance training;Functional mobility training;Transfer training;ADL/Self-care training;Neuromuscular re-education;Gait training; Safety education & training;Patient/Caregiver education & training   Restrictions  Restrictions/Precautions  Restrictions/Precautions: General Precautions  Required Braces or Orthoses?: No   Subjective    Subjective  Subjective: Pt. in chair upon arrival with  present, agreeable to therapy. Pain: 8/10 back pain  Orientation  Overall Orientation Status: Within Normal Limits   Objective   Bed Mobility Training  Bed Mobility Training: No  Overall Level of Assistance: Minimum assistance  Interventions: Verbal cues  Rolling: Minimum assistance  Supine to Sit: Minimum assistance  Balance  Sitting: Intact  Standing: Impaired (supported ALBERTINA no more than 1-2 min)  Standing - Static: Fair  Standing - Dynamic: Fair  Transfer Training  Transfer Training: Yes  Overall Level of Assistance: Minimum assistance; Moderate assistance  Interventions: Verbal cues  Sit to Stand: Minimum assistance; Moderate assistance  Stand to Sit: Minimum assistance; Moderate assistance  Stand Pivot Transfers: Minimum assistance  Bed to Chair: Contact-guard assistance  Gait Training  Gait Training: Yes  Gait  Overall Level of Assistance: Minimum assistance  Interventions: Verbal cues  Base of Support: Widened  Speed/Helen: Slow;Shuffled  Step Length: Right shortened;Left shortened  Gait Abnormalities: Antalgic  Distance (ft):  (5ft)  Assistive Device: Walker, rolling  PT Exercises  Exercise Treatment: Seated exercises B LE x20. STS x5. Standing marches x12 B Le   Safety Devices  Type of Devices: All fall risk precautions in place;Call light within reach;Nurse notified (pt. left on [facility], RN aware and  present in room with pt.)   Goals  Short Term Goals  Time Frame for Short Term Goals: 3 DAYS. Short Term Goal 1: CGA GAIT 50 FT FWW  Short Term Goal 2: SBA TRANSFERS AND SBA BED MOBILITY. Long Term Goals  Time Frame for Long Term Goals : 4 WEEKS  Long Term Goal 1: IND GAIT  FT,  Long Term Goal 2: IND TRANSFERS  Patient Goals   Patient Goals : RETURN HOME WITH ASSIST FROM . Education  Patient Education  Education Given To: Patient; Family  Education Provided: Role of Therapy;Transfer Training  Education Provided Comments: Hand placement for transfer training  Education Method: Verbal  Barriers to Learning: None  Education Outcome: Verbalized understanding  Therapy Time   Individual Concurrent Group Co-treatment   Time In 1340         Time Out 1408         Minutes [address], [place]

## 2022-10-12 NOTE — PROGRESS NOTES
[facility]    Facility/Department: [facility] AT THE [facility] MED SURG    Speech Language Pathology    Clinical Bedside Swallow Evaluation    NAME:Toshia Sheldon    : 1945 (78 y.o.)    MRN: [id number]    ROOM: [facility]6044-    ADMISSION DATE: 10/8/2022    PATIENT DIAGNOSIS(ES): Confusion [R41.0]  CO2 retention [E87.29]  Sepsis due to pneumonia ([address] [J18.9, A41.9]  Pneumonia due to infectious organism, unspecified laterality, unspecified part of lung [J18.9]    Chief Complaint   Patient presents with    Shortness of Breath     Onset this morning         Patient Active Problem List    Diagnosis Date Noted    Sepsis due to pneumonia ([address] 10/08/2022    Midline shift of brain due to hematoma ([address] 2022    Subdural hematoma 2022    Fall as cause of accidental injury in home as place of occurrence, initial encounter 2022    Pulmonary fibrosis ([address] 2022    A-fib ([address] 2022    Anemia 10/14/2021    Biventricular congestive heart failure ([address]     COPD (chronic obstructive pulmonary disease) ([address] 10/22/2020    Hypothyroidism 10/05/2018    Major depressive disorder 10/05/2018    Chronic midline low back pain without sciatica 10/05/2018    Iron deficiency anemia 10/05/2018       Past Medical History:   Diagnosis Date    A-fib ([address]     Biventricular congestive heart failure (HCC)     Bronchiectasis with acute exacerbation ([address] 2022    CAD (coronary artery disease)     Chronic pain syndrome     dilaudid infusion pump    COPD (chronic obstructive pulmonary disease) (HCC)     Depression     GERD (gastroesophageal reflux disease)     Hypothyroidism     Impaired fasting glucose     Iron deficiency anemia     Osteoporosis     Pulmonary fibrosis ([address] 2022    Subdural hematoma ([address] 2022       Past Surgical History:   Procedure Laterality Date    BACK SURGERY  1998    spinal cord stimulator    BACK SURGERY  1999    infusion pump insertion    BACK SURGERY  10/23/2003 spinal cord stimulator removed    BACK SURGERY  10/23/2003    new infusion pump placed    BACK SURGERY  09/11/2017    replaced infusion pump    CARPAL TUNNEL RELEASE Right 12/17/1999    CARPAL TUNNEL RELEASE Left 11/24/1999    CARPAL TUNNEL RELEASE Right 12/30/2002    CARPAL TUNNEL RELEASE Left 12/17/2003    CERVICAL [address] SURGERY  10/25/2004    anterior cervical diskectomy C7-T1    CERVICAL DISC SURGERY  12/22/2004    anterior cervical discectomy V9-0 (complicated by damaged nerve to vocal cord)    CRANIOTOMY Left 8/23/2022    LEFT CRANIOTOMY FOR SUBDURAL HEMATOMA EVACUATION performed by Yudi Roa DO at [address] [place] [address] Left 12/20/2018    ORIF wrist    HUMERUS FRACTURE SURGERY Right 10/03/2010    HYSTERECTOMY (CERVIX STATUS UNKNOWN)  08/20/1991    KNEE ARTHROSCOPY Right 06/02/2011    KNEE SURGERY Right 02/04/2016    repair distal portion of knee arthroplasty due to prosthesis loosening    LUMBAR [address] SURGERY  02/15/1994    due to scar tissue from previous surgery    LUMBAR DISCECTOMY  08/30/1993    L5-S1    LUMBAR LAMINECTOMY  06/09/2008    L3-4-5    NECK SURGERY  05/03/2002    posterior plate fusion    NECK SURGERY  12/09/2005    reconnect nerve to vocal cord [facility]    TOE AMPUTATION  10/08/2006    left 3rd toe - osteomyelitis    TOE AMPUTATION  03/07/2008    left 4th toe partial amputation    TOE AMPUTATION  10/03/2008    left 2nd toe    TOTAL KNEE ARTHROPLASTY Right 03/19/2021    WRIST FRACTURE SURGERY Left 12/20/2018    WRIST OPEN REDUCTION INTERNAL FIXATION-DISTAL RADIUS performed by Wil Romero MD at [facility] Left 12/20/2018    WRIST SURGERY Left 07/02/2019    left wrist ulnar shortening osteotomy       No Known Allergies    DATE ONSET: 10/08/2022    Date of Evaluation: 10/12/2022    Evaluating Therapist: HILLARY Rivas    Dysphagia Diagnosis    Dysphagia Diagnosis: Suspected needs further assessment    Recommended Diet    Recommendations: NPO;Modified barium swallow study    Diet Solids Recommendation: NPO    Liquid Consistency Recommendation: NPO    Recommended Form of Meds: Via alternative means of nutrition         Reason for Referral    Ernie Cat was referred for a bedside swallow evaluation to assess the efficiency of her swallow function, identify signs and symptoms of aspiration, identify risk factors, and make recommendations regarding safe dietary consistencies, effective compensatory strategies, and safe eating environment. Prior Dysphagia History  8/30/2022   Pt. Presents with + silent aspiration of consistencies tested. Pt. With basin prior to assessment and was expectorating secretions. Pt. Also noted to have a [place] diverticulum. Decreased laryngeal elevation, decreased BOT strength, Cricopharyngeal bar. Recommend NPO with alternative means of nutrition at this time. Patient Complaint       General    Chart Reviewed: Yes  Behavior/Cognition: Alert; Cooperative;Pleasant mood  Respiratory Status: O2 via nasual cannula  O2 Device: Nasal cannula  Communication Observation: Functional  Follows Directions: Simple  Dentition: Adequate  Patient Positioning: Upright in chair  Baseline Vocal Quality: Hoarse  Volitional Cough: Strong  Consistencies Administered: Pureed;Mildly Thick - straw    Vision and Hearing    Vision  Vision: Within Functional Limits  Vision Exceptions: Wears glasses for reading  Hearing  Hearing: Within functional limits    Current Diet level    Current Diet : Regular    Oral Motor    Labial: No impairment  Lingual: No impairment  Velum: No Impairment    Oral/Pharyngeal Phase    Pharyngeal Phase: Pt coughed on x1/3 trials of moderately thick liquid. No overt s/s of aspiration on x4 trials of pureed. However, can not rule out coughing was due to pnuemonia or aspiration.       Dysphagia Diagnosis    Dysphagia Diagnosis: Suspected needs further assessment        Recommendations    Requires SLP Intervention: Yes  Recommendations: NPO;Modified barium swallow study  D/C Recommendations: Ongoing speech therapy is recommended at next level of care  Diet Solids Recommendation: NPO  Liquid Consistency Recommendation: NPO  Recommended Form of Meds: Via alternative means of nutrition  Therapeutic Interventions: Patient/Family education  Frequency of Treatment: Daily during inpatient stay    Prognosis    Prognosis: Fair    Education    Individuals consulted  Consulted and agree with results and recommendations: Patient;RN  RN Name: Elio Chan    Patient Education: Educated on silent aspiration as pt presented with silent aspiration in previous MBSS. Treatment/Goals    Short-term Goals  Goal 1: Pt will complete MBSS to rule out aspiration and determine plan of care. Long-term Goals  Goal 1: Pt will tolerate least restrictive diet without s/s of aspiration during inpatient stay. Safety Devices    Safety Devices  Safety Devices in place: Yes  Type of devices: Left in chair; All fall risk precautions in place;Call light within reach; Chair alarm in place    Pain Assessment    Pain Assessment: Patient does not c/o pain       Therapy Time    SLP Individual Minutes  Time In: 8549  Time Out: 4359  Minutes: 20    Pt seen for bedside swallow evaluation this date to assess swallowing function. Pt with hx of pneumonia and reports coughing after swallowing solids/liquids. Oral phase WFL with adequate mastication and manipulation of bolus in oral cavity. Pt presented with x4 trials of pureed with no overt s/s of aspiration noted. Pt presented with nectar thick liquid with immediate cough x1 out of 3 trials. Can not rule out coughing being due to pneumonia or aspiration. Recommend MBSS to rule out silent aspiration as pt demonstrated this on previous MBSS dated 08/302022. Recommend NPO pending MBSS results.         Electronically signed: Anni Park M.S., CF-SLP            10/12/2022

## 2022-10-12 NOTE — PROGRESS NOTES
Occupational Therapy  Facility/Department: [facility] AT THE [facility] MED SURG  Daily Treatment Note  NAME: Aziza Macias  : 1945  MRN: [id number]    Date of Service: 10/12/2022    Discharge Recommendations:  Continue to assess pending progress, Subacute/Skilled Nursing Facility, Home with Home health OT         Patient Diagnosis(es): The primary encounter diagnosis was Pneumonia due to infectious organism, unspecified laterality, unspecified part of lung. Diagnoses of CO2 retention and Confusion were also pertinent to this visit. Assessment    Activity Tolerance: Patient tolerated treatment well;Patient limited by endurance; Patient limited by fatigue  Discharge Recommendations: Continue to assess pending progress;Subacute/Skilled Nursing Facility;Home with Home health OT      Plan   Occupational Therapy Plan  Times Per Week: 7  Times Per Day: Once a day  Current Treatment Recommendations: Strengthening; Functional mobility training;Self-Care / ADL; Safety education & training; Endurance training     Restrictions   none    Subjective   Subjective  Subjective: Pt in chair agreeable to therex then before starting requested toileting. \"Im very weak, dont let me fall. \"  Pain: Pt had no complaints of pain this date. Orientation  Overall Orientation Status: Within Normal Limits  Pain: 8/10 back pain           Objective    Vitals     Bed Mobility Training  Bed Mobility Training: No (pt in chair)    Balance  Sitting: Intact  Standing: Impaired (supported ALBERTINA no more than 1-2 min)  Standing - Static: Fair  Standing - Dynamic: Fair  Transfer Training  Transfer Training: Yes  Overall Level of Assistance: Moderate assistance (low pivot from chair to [facility] with decreased safety and weakness noted)       ADL  Toileting: Minimal assistance        Safety Devices  Type of Devices:  All fall risk precautions in place;Call light within reach;Nurse notified (pt left o [facility] with call light on lap and nurse Dom notified)     Patient Education  Education Given To: Patient  Education Provided: Role of Therapy;Plan of Care;Transfer Training  Education Method: Demonstration;Verbal  Barriers to Learning: None  Education Outcome: Verbalized understanding;Demonstrated understanding    Goals  Short Term Goals  Time Frame for Short Term Goals: 20 visits  Short Term Goal 1: Patient to tolerate 15 minutes ther-ex/ther-act to increase ease of ADL participation. Short Term Goal 2: Patient to complete standing sinkside ADLs with SBA. Short Term Goal 3: Patient to complete LB bathing/dressing with SBA. Short Term Goal 4: Pt will be educated on EC strategies and breathing techniques to decrease fatigue and improve participation in ADL's.        Therapy Time   Individual Concurrent Group Co-treatment   Time In 1118         Time Out 1128         Minutes PARISH Ding

## 2022-10-12 NOTE — PROGRESS NOTES
Physical Therapy  Facility/Department: [facility] AT THE [facility] MED SURG  Daily Treatment Note  NAME: Gael Ann  : 1945  MRN: [id number]    Date of Service: 10/12/2022    Discharge Recommendations:  Continue to assess pending progress, Subacute/Skilled Nursing Facility        Patient Diagnosis(es): The primary encounter diagnosis was Pneumonia due to infectious organism, unspecified laterality, unspecified part of lung. Diagnoses of CO2 retention and Confusion were also pertinent to this visit. Assessment   Assessment: Bed mobility Min A transfers CGA/Naty BLE x15 ambulation 8 feet FWW CGA  Activity Tolerance: Patient tolerated treatment well;Patient limited by endurance; Patient limited by fatigue     Plan    Physcial Therapy Plan  General Plan: 2 times a day 7 days a week  Current Treatment Recommendations: Strengthening;Balance training;Functional mobility training;Transfer training;ADL/Self-care training;Neuromuscular re-education;Gait training; Safety education & training;Patient/Caregiver education & training     Restrictions  Restrictions/Precautions  Restrictions/Precautions: General Precautions  Required Braces or Orthoses?: No     Subjective    Subjective  Subjective: Pt in bed and aeeable to therapy  Pain: 8/10 back pain  Orientation  Overall Orientation Status: Within Normal Limits     Objective   Vitals     Bed Mobility Training  Bed Mobility Training: Yes  Overall Level of Assistance: Minimum assistance  Interventions: Verbal cues  Rolling: Minimum assistance  Supine to Sit: Minimum assistance  Transfer Training  Transfer Training: Yes  Overall Level of Assistance: Minimum assistance  Interventions: Verbal cues  Stand to Sit: Minimum assistance  Bed to Chair: Contact-guard assistance  Gait Training  Gait Training: Yes  Gait  Distance (ft): 8 Feet  Assistive Device: Walker, rolling     PT Exercises  Exercise Treatment: seated BLE x15     Safety Devices  Type of Devices: Gait belt;Call light within reach;Chair alarm in place; Left in chair       Goals  Short Term Goals  Time Frame for Short Term Goals: 3 DAYS. Short Term Goal 1: CGA GAIT 50 FT FWW  Short Term Goal 2: SBA TRANSFERS AND SBA BED MOBILITY. Long Term Goals  Time Frame for Long Term Goals : 4 WEEKS  Long Term Goal 1: IND GAIT  FT,  Long Term Goal 2: IND TRANSFERS  Patient Goals   Patient Goals : RETURN HOME WITH ASSIST FROM .     Education  Patient Education  Education Given To: Patient  Education Provided: Role of Therapy;Transfer Training    Therapy Time   Individual Concurrent Group Co-treatment   Time In 0820         Time Out 0848         Minutes [address] [place] [address]

## 2022-10-12 NOTE — PLAN OF CARE
Problem: Discharge Planning  Goal: Discharge to home or other facility with appropriate resources  Outcome: Progressing  Flowsheets (Taken 10/12/2022 0035)  Discharge to home or other facility with appropriate resources:   Identify barriers to discharge with patient and caregiver   Arrange for needed discharge resources and transportation as appropriate   Identify discharge learning needs (meds, wound care, etc)  Note:  working with patient. Problem: Safety - Adult  Goal: Free from fall injury  Outcome: Progressing  Flowsheets (Taken 10/12/2022 0035)  Free From Fall Injury: Instruct family/caregiver on patient safety     Problem: ABCDS Injury Assessment  Goal: Absence of physical injury  Outcome: Progressing  Flowsheets (Taken 10/12/2022 0035)  Absence of Physical Injury: Implement safety measures based on patient assessment     Problem: Skin/Tissue Integrity  Goal: Absence of new skin breakdown  Description: 1. Monitor for areas of redness and/or skin breakdown  2. Assess vascular access sites hourly  3. Every 4-6 hours minimum:  Change oxygen saturation probe site  4. Every 4-6 hours:  If on nasal continuous positive airway pressure, respiratory therapy assess nares and determine need for appliance change or resting period. Outcome: Progressing  Note: Eusebio scale monitoring per protocol. Inspect skin for breakdown frequently. Encourage pt to make frequent large adjustments in position or assist patient with turning. Document all areas of breakdown.         Problem: Pain  Goal: Verbalizes/displays adequate comfort level or baseline comfort level  Outcome: Progressing  Flowsheets (Taken 10/12/2022 0035)  Verbalizes/displays adequate comfort level or baseline comfort level:   Encourage patient to monitor pain and request assistance   Administer analgesics based on type and severity of pain and evaluate response   Assess pain using appropriate pain scale   Implement non-pharmacological measures as appropriate and evaluate response  Note: Pain assessed every four hours and as needed using 0-10 pain scale. Pt educated on scale and uses scale appropriately. Encourage pt to notify staff of pain before pain becomes uncontrollable. Correlate periods of heavy activity after pain medication administration.  Use pharmacological and non pharmacological methods for pain relief such as: warm blankets, ice, television, reading, or rest.        Problem: Nutrition Deficit:  Goal: Optimize nutritional status  Outcome: Progressing

## 2022-10-12 NOTE — PROGRESS NOTES
Facsimile Transmission Cover Sheet    Information contained in this transmission is for the sole use of the intended recipients and may contain confidential and privileged information. Any unauthorized review, use, disclosure or distribution is prohibited. If you are not the intended recipient, please contact the sender and destroy all copies of the original message. Disclosure is made for the purpose of healthcare operations and continuity of care. To: __Jong Hester MD________________________    From: Speech Therapy       Sender: Tara Prieto M.S., CF-SLP  Phone Number: [phone] (Carlie Gordillo)    Tyrese Vargas current unit  [facility] [phone]  [phone]    Your bedside evaluation order for Tyrese Vargas has been completed. Based on the results speech therapy recommends:     Pt seen for bedside swallow evaluation this date to assess swallowing function. Pt with hx of pneumonia and reports coughing after swallowing solids/liquids. Oral phase WFL with adequate mastication and manipulation of bolus in oral cavity. Pt presented with x4 trials of pureed with no overt s/s of aspiration noted. Pt presented with nectar thick liquid with immediate cough x1 out of 3 trials. Can not rule out coughing being due to pneumonia or aspiration. Recommend MBSS to rule out silent aspiration as pt demonstrated this on previous MBSS dated 08/30/2022. Recommend NPO pending MBSS results. If you agree please enter the new diet order in [facility] or telephone the nursing unit. Diet will not change without your order.    Thank you,  Electronically signed by: Tara Prieto M.S., CF-SLP

## 2022-10-12 NOTE — PROGRESS NOTES
RESPIRATORY ASSESSMENT PROTOCOL                                                                                              Patient Name: Prieto Castañeda Room#: [id number]/[id number] : 1945     Admitting diagnosis: Confusion [R41.0]  CO2 retention [E87.29]  Sepsis due to pneumonia ([facility] [J18.9, A41.9]  Pneumonia due to infectious organism, unspecified laterality, unspecified part of lung [J18.9]       Medical History:   Past Medical History:   Diagnosis Date    A-fib ([place] [address]     Biventricular congestive heart failure (HCC)     Bronchiectasis with acute exacerbation ([place] [address] 2022    CAD (coronary artery disease)     Chronic pain syndrome     dilaudid infusion pump    COPD (chronic obstructive pulmonary disease) ([facility])     Depression     GERD (gastroesophageal reflux disease)     Hypothyroidism     Impaired fasting glucose     Iron deficiency anemia     Osteoporosis     Pulmonary fibrosis ([place] [address] 2022    Subdural hematoma ([place] [address] 2022       PATIENT ASSESSMENT    LABORATORY DATA  Hematology:   Lab Results   Component Value Date/Time    WBC 3.6 10/12/2022 05:30 AM    RBC 3.94 10/12/2022 05:30 AM    HGB 12.0 10/12/2022 05:30 AM    HCT 38.2 10/12/2022 05:30 AM     10/12/2022 05:30 AM     Chemistry:    Lab Results   Component Value Date/Time    PHART 7.332 10/10/2022 05:30 AM    LXI4ZTO 58.9 10/10/2022 05:30 AM    PO2ART 92.5 10/10/2022 05:30 AM    Q8UKQBTR 96.4 10/10/2022 05:30 AM    VXE2API 30.5 10/10/2022 05:30 AM    PBEA 2.9 10/10/2022 05:30 AM       VITALS  Heart Rate: 76   Resp: 18  BP: (!) 144/83  SpO2: 96 % O2 Device: Nasal cannula  Temp: 97.1 °F (36.2 °C)    SKIN COLOR  [] Normal  [] Pale  [] Dusky  [] Cyanotic    RESPIRATORY PATTERN  [] Normal  [] Dyspnea  [] Cheyne-Granger  [] Kussmaul  [] Biots    AMBULATORY  [] Yes  [] No  [] With Assistance    PEAK FLOW  Predicted:     Personal Best:        VITAL CAPACITY  Predicted value:  ml  Actual Value:  ml  30% of Predicted: ml  Patient Acuity 0 1 2 3 4 Score   Level of Concious (LOC) [x]  Alert & Oriented or Pt normal LOC []  Confused;follows directions []  Confused & uncooper-ative []  Obtunded []  Comatose 0   Respiratory Rate  (RR) []  Reg. rate & pattern. 12 - 20 bpm  []  Increased RR. Greater than 20 bpm   [x]  SOB w/ exertion or RR greater than 24 bpm []  Access- ory muscle use at rest. Abn.  resp. []  SOB at rest.   2   Bilateral Breath Sounds (BBS) []  Clear [x]  Diminish-ed bases  []  Diminish-ed t/o, or rales   []  Sporadic, scattered wheezes or rhonchi []  Persistentwheezes and, or absent BBS 1   Cough []  Strong, effective, & non-prod. [x]  Effective & prod. Less than 25 ml (2 TBSP) over past 24 hrs []  Ineffective & non-prod to less than 25 ML over past 24 hrs []  Ineffective and, or greater than 25 ml sputum prod. past 24 hrs. []  Nonspon- taneous; Requires suctioning 1   Pulmonary History  (PULM HX) []  No smoking and no chronic pulmonaryhistory []  Former smoker. Quit over 12 mos. ago []  Current smoker or quit w/ in 12 mos []  Pulm. History and, or 20 pk/yr smoking hx [x]  Admitted w/ acute pulm. dx and, or has been admitted w/ pulm. dx 2 or more times over past 12 mos 4   Surgical History this Admit  (SURG HX) [x]  No surgery []  General surgery []  Lower abdominal []  Thoracic or upper abdominal   []  Thoracic w/ pulm. disease 0   Chest X-Ray (CXR)/CT Scan []  Clear or not applicable []  Not available []  Atelect- asis or pleural effusions []  Localized infiltrate or pulm. edema [x]  Con-solidated Infiltrates, bilateral, or in more than 1 lobe 4   Slow or Forced VC, FEV1 OR PEFR (PULM FXN)  [x]  80% or greater, or not indicated []  Pt. unable to perform []  FEV1 or PEFR or VC 51-79%.   []  FEV1 or PEFR or VC  30-49%   []  FEV1 or PEFR or VC less than 30%   0   TOTAL ACUITY: 12       CARE PLAN    If Acuity Level is 2, 3, or 4 in any of the following:    [] BILATERAL BREATH SOUNDS (BBS)     [x] PULMONARY HISTORY (PULM HX)  [] PULMONARY FUNCTION (PULM FX)    Goal: Improve respiratory functions in patients with airway disease and decrease WOB    [x] AEROSOL PROTOCOL    Total Acuity:   16-32  []  Secondary Assessment in 24 hrs Total Acuity:  9-15  [x]  Secondary Assessment in 24 hrs Total Acuity:  4-8  []  Secondary Assessment in 48 hrs Total Acuity:  0-3  []  Secondary Assessment in 72 hrs   HHN AEROSOL THERAPY with  [physician-ordered bronchodilator(s)] q 4 & Albuterol PRN q2 hrs. Breath-Actuated Neb if BBS Acuity = 4, and pt. can use MP. Notify physician if condition deteriorates. HHN AEROSOL THERAPY with  [physician-ordered bronchodilator(s)]  QID and Albuterol PRN q4 hrs. Breath-Actuated Neb if BBS Acuity = 4, and pt. can use MP. Notify physician if condition deteriorates. MDI THERAPY with  2 actuations of [physician-ordered bronchodilator(s)] via spacer TID Albuterol and PRNq4 hrs. If unable to utilize MDI: HHN [physician-ordered bronchodilator(s)] TID and Albuterol PRN q4 hrs. Notify physician if condition deteriorates. MDI THERAPY with  [physician-ordered bronchodilator(s)] via spacer TID PRN. If unable to utilize MDI: HHN [physician-ordered bronchodilator(s)] TID PRN. Notify physician if condition deteriorates. If Acuity Level is 2, 3, or 4 in any of the following:    [] COUGH     [] SURGICAL HISTORY (SURG HX)  [x] CHEST XRAY (CXR)    Goal: Improvement in sputum mobilization in patients with ineffective airway clearance. Reverse atelectasis.     [x] Bronchopulmonary Hygiene Protocol    Total Acuity:   16-32  []  Secondary Assessment in 24 hrs Total Acuity:  9-15  [x]  Secondary Assessment in 24 hrs Total Acuity:  4-8  []  Secondary Assessment in 48 hrs Total Acuity:  0-3  []  Secondary Assessment in 72 hrs   METANEB QID with [physician-ordered bronchodilator(s)] if CXR Acuity = 4; otherwise:  PD&P, PEP, or Vest QID & PRN  NT Sxn PRN for ineffective cough  METANEB QID with [physician-ordered bronchodilator(s)] if CXR Acuity = 4; otherwise:  PD&P, PEP, or Vest TID & PRN  NT Sxn PRN for ineffective cough  Instruct patient to self-perform IS q1hr WA   Directed Cough self-performed q1hr WA     If Acuity Level is 2 or above in the following:    [] PULMONARY HISTORY (PULM HX)    Goal: Assist patient in quitting smoking to slow or stop the progression of lung disease.     [] Smoking Cessation Protocol    SMOKING CESSATION EDUCATION provided according to policy WA_239: (marlyn with an X)  ____Yes    ____ No     ____ NA    Smoking Cessation Booklet given:  ____Yes  ____No ____Patient [place] Rounds

## 2022-10-12 NOTE — PROGRESS NOTES
[facility] UNIT   APRN - Progress Note    Patient - Ernie Cat  Date of Admission -  10/8/2022 10:06 AM  Date of Evaluation -  10/12/2022  Hospital Day - 4      SUBJECTIVE:     The Ernie Cat is a 69 y.o. female Per nursing report and notes, overnight events include: no significant events. She resting in bed alert oriented no acute distress. Complains of chronic back pain. Stated she is coughing up some phlegmn      ROS:   Constitutional: negative  for fevers, and negative for chills. Respiratory: positive for shortness of breath, positive for cough, and negative for wheezing  Cardiovascular: negative for chest pain, and negative for palpitations  Gastrointestinal: negative for abdominal pain, negative for nausea,negative for vomiting, negative for diarrhea, and negative for constipation    All other systems were reviewed with the patient and are negative unless otherwise stated in HPI. OBJECTIVE:     VITAL SIGNS:  Patient Vitals for the past 8 hrs:   BP Temp Temp src Pulse Resp SpO2 Height Weight   10/12/22 0715 (!) 144/83 97.1 °F (36.2 °C) Temporal 83 18 97 % -- --   10/12/22 0517 -- -- -- 72 18 94 % -- --   10/12/22 0406 118/66 97.6 °F (36.4 °C) Temporal 57 20 96 % 5' 5\" (1.651 m) 162 lb 7.7 oz (73.7 kg)   10/12/22 0150 -- -- -- -- 18 -- -- --         Temp: 97.1 °F (36.2 °C)  Temp range:    Temp  Av.7 °F (36.5 °C)  Min: 97.1 °F (36.2 °C)  Max: 98.5 °F (36.9 °C)    BP: (!) 144/83  BP Range:      Systolic (40YGO), VJU:070 , Min:118 , HYO:720      Diastolic (21TIR), KZZ:15, Min:66, Max:83    Heart Rate: 83  Pulse Range:    Pulse  Av.5  Min: 57  Max: 99    Resp: 18  Resp Range:   Resp  Av.7  Min: 12  Max: 20    SpO2: 97 % on supplemental O2  SpO2 range:   SpO2  Av.1 %  Min: 92 %  Max: 99 %    Weight  Wt Readings from Last 3 Encounters:   10/12/22 162 lb 7.7 oz (73.7 kg)   22 143 lb (64.9 kg)   22 143 lb (64.9 kg)     Body mass index is 27.04 kg/m².     24HR INTAKE/OUTPUT:      Intake/Output Summary (Last 24 hours) at 10/12/2022 0838  Last data filed at 10/12/2022 0533  Gross per 24 hour   Intake 880 ml   Output 4475 ml   Net -3595 ml     Date 10/12/22 0000 - 10/12/22 2359   Shift 4714-1181 7935-9359 7722-2178 24 Hour Total   INTAKE   P.O. 150   150   Shift Total(mL/kg) 150(2)   150(2)   OUTPUT   Urine(mL/kg/hr) 1250(2.1)   1250   Shift Total(mL/kg) 1250(17)   1250(17)   Weight (kg) 73.7 73.7 73.7 73.7         PHYSICAL EXAM:  GEN:    Awake and following commands:     [] No   [x] Yes  MENTAL STATUS: alert and oriented x3. DISTRESS: Acute respiratory distress:       [x] No   [] Yes  EYES:  EOMI, pupils equal   NECK: Supple. No lymphadenopathy. No carotid bruit  CVS:    regular but tachycardic, no audible murmur  PULM: rhonchi and wheeze throughout no acute respiratory distress  ABD:    Bowels sounds normal.  Abdomen is soft. No distention. no tenderness to palpation. EXT:   trace edema bilaterally . No calf tenderness. NEURO: Moves all extremities. Motor and sensory are grossly intact  SKIN:  No rashes. No skin lesions.           MEDICATIONS:  Scheduled Meds:   amLODIPine  10 mg Oral Daily    DULoxetine  60 mg Oral Daily    hydroxychloroquine  300 mg Oral Daily    levETIRAcetam  1,000 mg Oral BID    levothyroxine  200 mcg Oral Daily    pantoprazole  40 mg Oral QAM AC    potassium bicarb-citric acid  20 mEq Oral BID    pregabalin  300 mg Oral BID    sertraline  50 mg Oral Daily    traZODone  200 mg Oral Nightly    tiotropium-olodaterol  2 puff Inhalation Daily    sodium chloride flush  5-40 mL IntraVENous 2 times per day    famotidine  20 mg Oral BID    enoxaparin  40 mg SubCUTAneous Daily    piperacillin-tazobactam  4,500 mg IntraVENous Q8H    levofloxacin  750 mg IntraVENous Q24H    ipratropium-albuterol  3 mL Inhalation 4x daily     Continuous Infusions:   sodium chloride Stopped (10/11/22 0643)     PRN Meds:   guaiFENesin-dextromethorphan, 5 mL, Q4H PRN  HYDROcodone-acetaminophen, 1 tablet, Q6H PRN  Benzocaine-Menthol, 1 lozenge, Q2H PRN  ondansetron, 4 mg, Q8H PRN  sodium chloride flush, 5-40 mL, PRN  sodium chloride, 25 mL, PRN  acetaminophen, 650 mg, Q6H PRN   Or  acetaminophen, 650 mg, Q6H PRN  albuterol, 2.5 mg, Q4H PRN      DATA:  Complete Blood Count:   Recent Labs     10/10/22  0510 10/11/22  0520 10/12/22  0530   WBC 3.7 3.1* 3.6   RBC 3.28* 3.53* 3.94*   HGB 10.1* 10.9* 12.0   HCT 31.8* 34.1* 38.2   MCV 97.0 96.6 97.0   RDW 13.6 13.7 13.2    201 209   SEGS 57 54 52   NEUTROABS 2.10 1.70 1.90   LYMPHOPCT 23* 25 27   LYMPHSABS 0.83* 0.78* 0.96*   MONOPCT 13* 13* 13*   EOSRELPCT 6* 7* 7*   BASOPCT 1 1 1   IMMGRAN 0 0 0        Recent Blood Glucose:   Recent Labs     10/10/22  0510 10/11/22  0520 10/12/22  0530   GLUCOSE 94 91 86        Comprehensive Metabolic Profile:   Recent Labs     10/10/22  0510 10/11/22  0520 10/12/22  0530   BUN 7* 7* 8   CREATININE 0.46* 0.47* 0.51    143 143   K 3.7 3.6* 4.0    106 99   CALCIUM 8.7 9.0 9.7   ANIONGAP 7* 6* 9   CO2 29 31 35*   PROT 5.7* 5.9* 6.6   LABALBU 3.1* 3.2* 3.6   BILITOT 0.2* 0.2* 0.3   ALKPHOS 68 70 81   AST 10 10 13   ALT 8 8 10        Urinalysis:   Lab Results   Component Value Date/Time    NITRU NEGATIVE 10/08/2022 11:24 AM    COLORU Yellow 10/08/2022 11:24 AM    PHUR 6.0 10/08/2022 11:24 AM    WBCUA 0 TO 2 09/09/2022 09:29 PM    RBCUA 0 TO 2 09/09/2022 09:29 PM    MUCUS NOT REPORTED 10/14/2021 08:07 PM    TRICHOMONAS NOT REPORTED 10/14/2021 08:07 PM    YEAST NOT REPORTED 10/14/2021 08:07 PM    BACTERIA 1+ 09/09/2022 09:29 PM    SPECGRAV 1.015 10/08/2022 11:24 AM    LEUKOCYTESUR NEGATIVE 10/08/2022 11:24 AM    UROBILINOGEN Normal 10/08/2022 11:24 AM    BILIRUBINUR NEGATIVE 10/08/2022 11:24 AM    GLUCOSEU NEGATIVE 10/08/2022 11:24 AM    KETUA NEGATIVE 10/08/2022 11:24 AM    AMORPHOUS 2+ 09/09/2022 09:29 PM       HgBA1c:    Lab Results   Component Value Date/Time    LABA1C 5.7 06/20/2022 10:00 AM       TSH:    Lab Results   Component Value Date/Time    TSH 11.11 06/20/2022 10:01 AM       Lactic Acid:   Lab Results   Component Value Date/Time    LACTA 1.9 04/28/2022 03:14 PM    LACTA 2.0 02/06/2022 10:51 AM    LACTA 2.5 02/05/2022 03:30 PM        High Sensitivity Troponin:  No results for input(s): TROPHS in the last 72 hours. Pro-BNP:  Lab Results   Component Value Date    PROBNP 117 10/08/2022     D-Dimer:  Lab Results   Component Value Date    DDIMER 0.69 (H) 01/18/2022     PT/INR:    Lab Results   Component Value Date/Time    PROTIME 13.0 10/08/2022 11:01 AM    INR 1.0 10/08/2022 11:01 AM     PTT:    Lab Results   Component Value Date/Time    APTT 28.7 10/08/2022 11:01 AM       CRP: No results for input(s): CRP in the last 72 hours. ABGs:   Lab Results   Component Value Date/Time    PHART 7.332 10/10/2022 05:30 AM    DAK4VYN 58.9 10/10/2022 05:30 AM    PO2ART 92.5 10/10/2022 05:30 AM    OWU1TLC 30.5 10/10/2022 05:30 AM    RDR2YQJ 35 11/02/2020 04:16 AM    K5YZURCW 96.4 10/10/2022 05:30 AM    FIO2 28 10/10/2022 05:30 AM         Radiology/Imaging:  CT Head W/O Contrast   Final Result   No acute process. No significant change in primarily low-density left   convexity extra-axial collection. XR CHEST PORTABLE   Final Result   Suspected bilateral infectious/inflammatory airways process.                ASSESSMENT / PLAN:     Sepsis due to pneumonia ([address]  Continue current therapy  Continue IV Zosyn  Continue IV Levaquin  Stop IV fluids  Trend labs-improving  Remove Sheikh  Acute on chronic respiratory failure with hypoxia and hypercapnia  Trend ABGs-improving  Wean oxygen  CPAP intemittant  Mucomyst neb x 1  COPD/pulmonary fibrosis  Continue Anoro Ellipta, duo nebs  Hypertension  Continue amlodipine  Chronic pain syndrome  Continue Norco  Nutrition status:   at risk for malnutrition  Dietician consult initiated  [] NPO [] TPN      [] Tube feed [] Clear liquid        [] Full liquid [x] regular diet         [] Fluid restriction   [] Diabetic diet   Prophylaxis:   DVT: Lovenox   Stress Ulcer: H2 Blocker   High risk medications: none   Disposition:    Discharge plan is [place]  She is approved by Insurance at this time        RYAN Gambino CNP , JOSENP-C  10/12/2022  8:38 AM

## 2022-10-13 ENCOUNTER — APPOINTMENT (OUTPATIENT)
Dept: GENERAL RADIOLOGY | Age: 77
DRG: 871 | End: 2022-10-13
Payer: MEDICARE

## 2022-10-13 LAB
ABSOLUTE EOS #: 0.21 K/UL (ref 0–0.44)
ABSOLUTE IMMATURE GRANULOCYTE: <0.03 K/UL (ref 0–0.3)
ABSOLUTE LYMPH #: 0.92 K/UL (ref 1.1–3.7)
ABSOLUTE MONO #: 0.34 K/UL (ref 0.1–1.2)
ALBUMIN SERPL-MCNC: 3.7 G/DL (ref 3.5–5.2)
ALBUMIN/GLOBULIN RATIO: 1.3 (ref 1–2.5)
ALP BLD-CCNC: 80 U/L (ref 35–104)
ALT SERPL-CCNC: 11 U/L (ref 5–33)
ANION GAP SERPL CALCULATED.3IONS-SCNC: 5 MMOL/L (ref 9–17)
AST SERPL-CCNC: 14 U/L
BASOPHILS # BLD: 1 % (ref 0–2)
BASOPHILS ABSOLUTE: <0.03 K/UL (ref 0–0.2)
BILIRUB SERPL-MCNC: 0.3 MG/DL (ref 0.3–1.2)
BUN BLDV-MCNC: 9 MG/DL (ref 8–23)
BUN/CREAT BLD: 17 (ref 9–20)
CALCIUM SERPL-MCNC: 9.9 MG/DL (ref 8.6–10.4)
CHLORIDE BLD-SCNC: 101 MMOL/L (ref 98–107)
CO2: 36 MMOL/L (ref 20–31)
CREAT SERPL-MCNC: 0.52 MG/DL (ref 0.5–0.9)
CULTURE: NORMAL
CULTURE: NORMAL
EOSINOPHILS RELATIVE PERCENT: 7 % (ref 1–4)
GFR SERPL CREATININE-BSD FRML MDRD: >60 ML/MIN/1.73M2
GLUCOSE BLD-MCNC: 94 MG/DL (ref 70–99)
HCT VFR BLD CALC: 37.3 % (ref 36.3–47.1)
HEMOGLOBIN: 11.9 G/DL (ref 11.9–15.1)
IMMATURE GRANULOCYTES: 0 %
LYMPHOCYTES # BLD: 31 % (ref 24–43)
Lab: NORMAL
Lab: NORMAL
MAGNESIUM: 1.9 MG/DL (ref 1.6–2.6)
MCH RBC QN AUTO: 29.8 PG (ref 25.2–33.5)
MCHC RBC AUTO-ENTMCNC: 31.9 G/DL (ref 28.4–34.8)
MCV RBC AUTO: 93.5 FL (ref 82.6–102.9)
MONOCYTES # BLD: 11 % (ref 3–12)
NRBC AUTOMATED: 0 PER 100 WBC
PDW BLD-RTO: 13.3 % (ref 11.8–14.4)
PLATELET # BLD: 207 K/UL (ref 138–453)
PMV BLD AUTO: 9.8 FL (ref 8.1–13.5)
POTASSIUM SERPL-SCNC: 3.3 MMOL/L (ref 3.7–5.3)
RBC # BLD: 3.99 M/UL (ref 3.95–5.11)
SEG NEUTROPHILS: 50 % (ref 36–65)
SEGMENTED NEUTROPHILS ABSOLUTE COUNT: 1.49 K/UL (ref 1.5–8.1)
SODIUM BLD-SCNC: 142 MMOL/L (ref 135–144)
SPECIMEN DESCRIPTION: NORMAL
SPECIMEN DESCRIPTION: NORMAL
TOTAL PROTEIN: 6.5 G/DL (ref 6.4–8.3)
WBC # BLD: 3 K/UL (ref 3.5–11.3)

## 2022-10-13 PROCEDURE — 83735 ASSAY OF MAGNESIUM: CPT

## 2022-10-13 PROCEDURE — 74230 X-RAY XM SWLNG FUNCJ C+: CPT

## 2022-10-13 PROCEDURE — 80053 COMPREHEN METABOLIC PANEL: CPT

## 2022-10-13 PROCEDURE — 6370000000 HC RX 637 (ALT 250 FOR IP): Performed by: INTERNAL MEDICINE

## 2022-10-13 PROCEDURE — 2700000000 HC OXYGEN THERAPY PER DAY

## 2022-10-13 PROCEDURE — 85025 COMPLETE CBC W/AUTO DIFF WBC: CPT

## 2022-10-13 PROCEDURE — 97535 SELF CARE MNGMENT TRAINING: CPT

## 2022-10-13 PROCEDURE — 97530 THERAPEUTIC ACTIVITIES: CPT

## 2022-10-13 PROCEDURE — 1200000000 HC SEMI PRIVATE

## 2022-10-13 PROCEDURE — 92611 MOTION FLUOROSCOPY/SWALLOW: CPT

## 2022-10-13 PROCEDURE — 2580000003 HC RX 258: Performed by: INTERNAL MEDICINE

## 2022-10-13 PROCEDURE — 94640 AIRWAY INHALATION TREATMENT: CPT

## 2022-10-13 PROCEDURE — 6370000000 HC RX 637 (ALT 250 FOR IP): Performed by: NURSE PRACTITIONER

## 2022-10-13 PROCEDURE — 97110 THERAPEUTIC EXERCISES: CPT

## 2022-10-13 PROCEDURE — 94761 N-INVAS EAR/PLS OXIMETRY MLT: CPT

## 2022-10-13 PROCEDURE — 94664 DEMO&/EVAL PT USE INHALER: CPT

## 2022-10-13 PROCEDURE — 36415 COLL VENOUS BLD VENIPUNCTURE: CPT

## 2022-10-13 PROCEDURE — 6360000002 HC RX W HCPCS: Performed by: NURSE PRACTITIONER

## 2022-10-13 PROCEDURE — 94660 CPAP INITIATION&MGMT: CPT

## 2022-10-13 PROCEDURE — 94669 MECHANICAL CHEST WALL OSCILL: CPT

## 2022-10-13 PROCEDURE — 97116 GAIT TRAINING THERAPY: CPT

## 2022-10-13 PROCEDURE — 6360000002 HC RX W HCPCS: Performed by: INTERNAL MEDICINE

## 2022-10-13 RX ORDER — ACETYLCYSTEINE 200 MG/ML
600 SOLUTION ORAL; RESPIRATORY (INHALATION) ONCE
Status: COMPLETED | OUTPATIENT
Start: 2022-10-13 | End: 2022-10-13

## 2022-10-13 RX ADMIN — PIPERACILLIN AND TAZOBACTAM 4500 MG: 4; .5 INJECTION, POWDER, LYOPHILIZED, FOR SOLUTION INTRAVENOUS at 04:15

## 2022-10-13 RX ADMIN — AMLODIPINE BESYLATE 10 MG: 10 TABLET ORAL at 08:38

## 2022-10-13 RX ADMIN — TRAZODONE HYDROCHLORIDE 200 MG: 50 TABLET ORAL at 21:17

## 2022-10-13 RX ADMIN — IPRATROPIUM BROMIDE AND ALBUTEROL SULFATE 3 ML: 2.5; .5 SOLUTION RESPIRATORY (INHALATION) at 15:41

## 2022-10-13 RX ADMIN — HYDROXYCHLOROQUINE SULFATE 300 MG: 200 TABLET ORAL at 08:38

## 2022-10-13 RX ADMIN — PIPERACILLIN AND TAZOBACTAM 4500 MG: 4; .5 INJECTION, POWDER, LYOPHILIZED, FOR SOLUTION INTRAVENOUS at 12:52

## 2022-10-13 RX ADMIN — DULOXETINE HYDROCHLORIDE 60 MG: 60 CAPSULE, DELAYED RELEASE ORAL at 08:38

## 2022-10-13 RX ADMIN — HYDROCODONE BITARTRATE AND ACETAMINOPHEN 1 TABLET: 10; 325 TABLET ORAL at 21:17

## 2022-10-13 RX ADMIN — LEVOTHYROXINE SODIUM 200 MCG: 100 TABLET ORAL at 08:49

## 2022-10-13 RX ADMIN — SERTRALINE HYDROCHLORIDE 50 MG: 50 TABLET ORAL at 08:39

## 2022-10-13 RX ADMIN — FAMOTIDINE 20 MG: 20 TABLET ORAL at 21:17

## 2022-10-13 RX ADMIN — FAMOTIDINE 20 MG: 20 TABLET ORAL at 08:39

## 2022-10-13 RX ADMIN — ACETYLCYSTEINE 600 MG: 200 SOLUTION ORAL; RESPIRATORY (INHALATION) at 10:22

## 2022-10-13 RX ADMIN — Medication 1000 MG: at 08:39

## 2022-10-13 RX ADMIN — PREGABALIN 300 MG: 75 CAPSULE ORAL at 08:39

## 2022-10-13 RX ADMIN — PREGABALIN 300 MG: 75 CAPSULE ORAL at 21:17

## 2022-10-13 RX ADMIN — POTASSIUM BICARBONATE 20 MEQ: 782 TABLET, EFFERVESCENT ORAL at 08:39

## 2022-10-13 RX ADMIN — ENOXAPARIN SODIUM 40 MG: 100 INJECTION SUBCUTANEOUS at 08:39

## 2022-10-13 RX ADMIN — ACETAMINOPHEN 650 MG: 325 TABLET, FILM COATED ORAL at 01:54

## 2022-10-13 RX ADMIN — HYDROCODONE BITARTRATE AND ACETAMINOPHEN 1 TABLET: 10; 325 TABLET ORAL at 14:45

## 2022-10-13 RX ADMIN — IPRATROPIUM BROMIDE AND ALBUTEROL SULFATE 3 ML: 2.5; .5 SOLUTION RESPIRATORY (INHALATION) at 05:18

## 2022-10-13 RX ADMIN — TIOTROPIUM BROMIDE AND OLODATEROL 2 PUFF: 3.124; 2.736 SPRAY, METERED RESPIRATORY (INHALATION) at 10:24

## 2022-10-13 RX ADMIN — PIPERACILLIN AND TAZOBACTAM 4500 MG: 4; .5 INJECTION, POWDER, LYOPHILIZED, FOR SOLUTION INTRAVENOUS at 20:19

## 2022-10-13 RX ADMIN — ACETAMINOPHEN 650 MG: 325 TABLET, FILM COATED ORAL at 16:09

## 2022-10-13 RX ADMIN — POTASSIUM BICARBONATE 20 MEQ: 782 TABLET, EFFERVESCENT ORAL at 21:17

## 2022-10-13 RX ADMIN — IPRATROPIUM BROMIDE AND ALBUTEROL SULFATE 3 ML: 2.5; .5 SOLUTION RESPIRATORY (INHALATION) at 20:42

## 2022-10-13 RX ADMIN — LEVOFLOXACIN 750 MG: 5 INJECTION, SOLUTION INTRAVENOUS at 15:02

## 2022-10-13 RX ADMIN — POLYETHYLENE GLYCOL 3350 17 G: 17 POWDER, FOR SOLUTION ORAL at 05:35

## 2022-10-13 RX ADMIN — Medication 1000 MG: at 21:17

## 2022-10-13 RX ADMIN — PANTOPRAZOLE SODIUM 40 MG: 40 TABLET, DELAYED RELEASE ORAL at 08:49

## 2022-10-13 RX ADMIN — IPRATROPIUM BROMIDE AND ALBUTEROL SULFATE 3 ML: 2.5; .5 SOLUTION RESPIRATORY (INHALATION) at 10:20

## 2022-10-13 RX ADMIN — HYDROCODONE BITARTRATE AND ACETAMINOPHEN 1 TABLET: 10; 325 TABLET ORAL at 08:30

## 2022-10-13 ASSESSMENT — PAIN - FUNCTIONAL ASSESSMENT

## 2022-10-13 ASSESSMENT — PAIN SCALES - GENERAL
PAINLEVEL_OUTOF10: 8
PAINLEVEL_OUTOF10: 6
PAINLEVEL_OUTOF10: 8
PAINLEVEL_OUTOF10: 9
PAINLEVEL_OUTOF10: 8
PAINLEVEL_OUTOF10: 9
PAINLEVEL_OUTOF10: 9
PAINLEVEL_OUTOF10: 8
PAINLEVEL_OUTOF10: 3

## 2022-10-13 ASSESSMENT — PAIN DESCRIPTION - DESCRIPTORS
DESCRIPTORS: ACHING

## 2022-10-13 ASSESSMENT — PAIN DESCRIPTION - LOCATION
LOCATION: BACK;NECK;LEG
LOCATION: HEAD
LOCATION: HEAD;BACK
LOCATION: HEAD
LOCATION: BACK
LOCATION: HEAD;BACK
LOCATION: HEAD
LOCATION: BACK;LEG
LOCATION: BACK

## 2022-10-13 ASSESSMENT — PAIN DESCRIPTION - PAIN TYPE
TYPE: ACUTE PAIN;CHRONIC PAIN
TYPE: CHRONIC PAIN
TYPE: ACUTE PAIN
TYPE: ACUTE PAIN
TYPE: ACUTE PAIN;CHRONIC PAIN
TYPE: ACUTE PAIN
TYPE: CHRONIC PAIN

## 2022-10-13 ASSESSMENT — PAIN DESCRIPTION - ONSET
ONSET: ON-GOING
ONSET: SUDDEN
ONSET: ON-GOING

## 2022-10-13 ASSESSMENT — PAIN DESCRIPTION - ORIENTATION
ORIENTATION: LEFT;MID
ORIENTATION: LEFT;MID
ORIENTATION: MID
ORIENTATION: MID;LEFT;RIGHT
ORIENTATION: MID
ORIENTATION: RIGHT;LEFT
ORIENTATION: LEFT

## 2022-10-13 ASSESSMENT — PAIN DESCRIPTION - FREQUENCY
FREQUENCY: INTERMITTENT
FREQUENCY: CONTINUOUS
FREQUENCY: INTERMITTENT

## 2022-10-13 NOTE — PROGRESS NOTES
Writer at bedside at this time to perform initial shift assessment. Patient is sitting up in the chair at this time. Vital signs taken and assessment completed at this time. Patient is alert and oriented x4 and has complaint of pain 9/10 in her back and legs; patient denies wanting any pain medication at this time and states that she wants it closer to bedtime. Patient denies any further needs at this time. Call light within reach, chair alarm on for safety, will continue to monitor.

## 2022-10-13 NOTE — PROGRESS NOTES
Physical Therapy  Facility/Department: [facility] THE [facility] MED SURG  Daily Treatment Note  NAME: Genevieve Llanos  : 1945  MRN: [id number]    Date of Service: 10/13/2022    Discharge Recommendations:  Continue to assess pending progress, Subacute/Skilled Nursing Facility        Patient Diagnosis(es): The primary encounter diagnosis was Pneumonia due to infectious organism, unspecified laterality, unspecified part of lung. Diagnoses of CO2 retention and Confusion were also pertinent to this visit. Assessment   Assessment: Pt with fair tolerance to treatment with min-mod rest breaks throughout. Initiated weight shifting and standing therex at  to improve standing tolerance, proprioceptive awareness and safety with standing activities--pt requiring Nellie d/t involuntary movements altering safety. Continue to progress as tolerated. Plan    Physcial Therapy Plan  General Plan: 2 times a day 7 days a week (1x per day on weekends.)     Restrictions  Restrictions/Precautions  Restrictions/Precautions: General Precautions  Required Braces or Orthoses?: No     Subjective    Subjective  Subjective: Pt in bed upon PTAs arrival, pleasant and agreeable to therapy despite reporting fatigue. Pain: 8/10 back pain chronic     Objective   Vitals     Bed Mobility Training  Bed Mobility Training: No  Balance  Standing - Static: Fair (Fair-)  Standing - Dynamic: Poor (Poor+)  Transfer Training  Transfer Training: Yes  Overall Level of Assistance: Minimum assistance; Moderate assistance  Interventions: Verbal cues; Visual cues; Safety awareness training  Sit to Stand: Minimum assistance; Moderate assistance  Stand to Sit: Minimum assistance; Moderate assistance  Gait Training  Gait Training: No (Pt with involuntary body motions, exaggerated once in standing position and included inability to maintain TKE or upright posture. Pt completed static and dynamic balance tasks at RW ~3.5 minutes with CGA-Nellie to maintain balance.  Pt with mod-max fatigue)     PT Exercises  Exercise Treatment: Seated and supine exercises B LE x15 with min-mod rest breaks. Standing B LE marches, mini squats and calf raises x ~8 each at RW with CGA-Nellie. Safety Devices  Type of Devices: All fall risk precautions in place;Call light within reach; Chair alarm in place; Left in chair;Gait belt;Nurse notified       Goals  Short Term Goals  Time Frame for Short Term Goals: 3 DAYS. Short Term Goal 1: CGA GAIT 50 FT FWW  Short Term Goal 2: SBA TRANSFERS AND SBA BED MOBILITY. Long Term Goals  Time Frame for Long Term Goals : 4 WEEKS  Long Term Goal 1: IND GAIT  FT,  Long Term Goal 2: IND TRANSFERS  Patient Goals   Patient Goals : RETURN HOME WITH ASSIST FROM .     Education  Patient Education  Education Given To: Patient  Education Provided: Home Exercise Program;Transfer Training  Education Provided Comments: Cues for proper hand placement, optimal technique with therex, and initiated weight shifting and standing therex this AM.  Education Method: Verbal;Demonstration  Education Outcome: Verbalized understanding;Continued education needed    Therapy Time   Individual Concurrent Group Co-treatment   Time In 4200         Time Out [phone]         Minutes [address] [facility], [address] [place] [address]

## 2022-10-13 NOTE — PROGRESS NOTES
Physical Therapy  Facility/Department: [facility] AT THE [facility] MED SURG  Daily Treatment Note  NAME: Brayan Lopez  : 1945  MRN: [id number]    Date of Service: 10/13/2022    Discharge Recommendations:  Continue to assess pending progress, Subacute/Skilled Nursing Facility        Patient Diagnosis(es): The primary encounter diagnosis was Pneumonia due to infectious organism, unspecified laterality, unspecified part of lung. Diagnoses of CO2 retention and Confusion were also pertinent to this visit. Assessment   Assessment: Pt with fair tolerance to treatment with min-mod rest breaks throughout. Initiated weight shifting and standing therex at  to improve standing tolerance, proprioceptive awareness and safety with standing activities--pt requiring Nellie d/t involuntary movements altering safety. Continue to progress as tolerated. Activity Tolerance: Patient tolerated treatment well;Patient limited by fatigue     Plan    Physcial Therapy Plan  General Plan: 2 times a day 7 days a week  Current Treatment Recommendations: Strengthening;Balance training;Functional mobility training;Transfer training;ADL/Self-care training;Neuromuscular re-education;Gait training; Safety education & training;Patient/Caregiver education & training     Restrictions  Restrictions/Precautions  Restrictions/Precautions: General Precautions  Required Braces or Orthoses?: No     Subjective    Subjective  Subjective: Pt reports HA, does not rate at this time, nursing in room. Pt agreeable to therapy.   Pain: 8/10 back pain chronic     Objective   Vitals     Bed Mobility Training  Bed Mobility Training: No  Balance  Sitting: Intact  Standing: Impaired  Standing - Static: Constant support (Pt requried Min- MOD A for retrograde LOB standing x 1.5 min ALBERTINA supported and demo'd shaking and instability throughout, BLE began to [place] out\" and pt was returned to seat.)  Standing - Dynamic: Poor (Poor+)  Transfer Training  Transfer Training: Yes  Overall Level of Assistance: Moderate assistance;Assist X2  Interventions: Safety awareness training; Tactile cues; Verbal cues (Frequent cueing for hand placement.)  Sit to Stand: Moderate assistance;Assist X2  Stand to Sit: Moderate assistance;Assist X2  Gait Training  Gait Training: No     PT Exercises  Exercise Treatment: Seated and reclined BLE ther ex x15. Pt with 2-3 short RBs with each. Static Standing Balance Exercises: Pt completed static standing at 2WW for trials of 20 sec, 10 sec and 25 sec. Pt required Nellie/modA x2 to maintain balance d/t involuntary movement and increased shakiness. Safety Devices  Type of Devices: All fall risk precautions in place;Call light within reach; Chair alarm in place; Left in chair;Nurse notified;Gait belt  Restraints  Restraints Initially in Place: No       Goals  Short Term Goals  Time Frame for Short Term Goals: 3 DAYS. Short Term Goal 1: CGA GAIT 50 FT FWW  Short Term Goal 2: SBA TRANSFERS AND SBA BED MOBILITY. Long Term Goals  Time Frame for Long Term Goals : 4 WEEKS  Long Term Goal 1: IND GAIT  FT,  Long Term Goal 2: IND TRANSFERS  Patient Goals   Patient Goals : RETURN HOME WITH ASSIST FROM . Education  Patient Education  Education Provided: Transfer Training;Role of Therapy  Education Provided Comments: Hand placement for all transfers.   Education Method: Verbal;Demonstration  Barriers to Learning: None  Education Outcome: Verbalized understanding;Continued education needed    Therapy Time   Individual Concurrent Group Co-treatment   Time In 1600         Time Out 1623         Minutes [place], [place]

## 2022-10-13 NOTE — PROGRESS NOTES
Occupational Therapy  Facility/Department: [facility] AT THE [facility] MED SURG  Daily Treatment Note  NAME: Tawny Garcia  : 1945  MRN: [id number]    Date of Service: 10/13/2022    Discharge Recommendations:  Continue to assess pending progress, Subacute/Skilled Nursing Facility, Home with Home health OT         Patient Diagnosis(es): The primary encounter diagnosis was Pneumonia due to infectious organism, unspecified laterality, unspecified part of lung. Diagnoses of CO2 retention and Confusion were also pertinent to this visit. Assessment    Activity Tolerance: Patient tolerated treatment well;Patient limited by fatigue  Discharge Recommendations: Continue to assess pending progress;Subacute/Skilled Nursing Facility;Home with Home health OT      Plan   Occupational Therapy Plan  Times Per Week: 7  Times Per Day: Once a day  Current Treatment Recommendations: Strengthening; Functional mobility training;Self-Care / ADL; Safety education & training; Endurance training     Restrictions   none    Subjective   Subjective  Subjective: Pt in chair agreeabel to therex and static standing  Pain: 8/10 neck back leg pain, nurse notified pain meds given  Pain: 8/10 back pain chronic           Objective    Vitals     Bed Mobility Training  Bed Mobility Training: No  Balance  Sitting: Intact  Standing: Impaired  Standing - Static: Constant support (Pt requried Min- MOD A for retrograde LOB standing x 1.5 min ALBERTINA supported and demo'd shaking and instability throughout, BLE began to [place] out\" and pt was returned to seat.)  Standing - Dynamic: Poor   Transfer Training  Transfer Training: Yes  Overall Level of Assistance: Minimum assistance; Moderate assistance (from recliner, retrograde LOB noted upon standing and throughout, ALBERTINA supported)  Interventions: Safety awareness training; Tactile cues; Verbal cues (hand placement and tech)  Sit to Stand: Minimum assistance; Moderate assistance  Stand to Sit: Minimum assistance; Moderate assistance          OT Exercises  Exercise Treatment: Pt tolerated BUE ther ex with 1# dumbbell x 7 planes x 10 reps x 1 set, yellow flex bar bends x 20 reps x 1 set and green digiflex x 20 to increase UE strength and endurance in order to ease completion of ADL tasks. Pt required RBs as needed secondary to fatigue. Decreased ROM in L shoulder from previous injury. Safety Devices  Type of Devices: All fall risk precautions in place;Call light within reach; Chair alarm in place; Left in chair  Restraints  Restraints Initially in Place: No     Patient Education  Education Given To: Patient; Family  Education Provided: Role of Therapy;Plan of Care;Home Exercise Program;ADL Adaptive Strategies;Transfer Training;Energy Conservation; Fall Prevention Strategies; Equipment;IADL Safety  Education Provided Comments: d/c folder contents  Education Method: Demonstration;Verbal;Printed Information/Hand-outs  Barriers to Learning: None  Education Outcome: Verbalized understanding;Demonstrated understanding    Goals  Short Term Goals  Time Frame for Short Term Goals: 20 visits  Short Term Goal 1: Patient to tolerate 15 minutes ther-ex/ther-act to increase ease of ADL participation. Short Term Goal 2: Patient to complete standing sinkside ADLs with SBA. Short Term Goal 3: Patient to complete LB bathing/dressing with SBA. Short Term Goal 4: Pt will be educated on EC strategies and breathing techniques to decrease fatigue and improve participation in ADL's.        Therapy Time   Individual Concurrent Group Co-treatment   Time In 1440         Time Out 1505         Minutes 25                 PARISH Teixeira

## 2022-10-13 NOTE — PROGRESS NOTES
Comprehensive Nutrition Assessment    Type and Reason for Visit:  Reassess    Nutrition Recommendations/Plan:   NPO recommended by SLP. Increase ensure to 8 oz TID if oral intakes appropriate following MBS. Malnutrition Assessment:  Malnutrition Status: At risk for malnutrition (Comment) (10/10/22 0820)    Context:  Acute Illness     Findings of the 6 clinical characteristics of malnutrition:  Energy Intake:  Mild decrease in energy intake (Comment)  Weight Loss:  No significant weight loss     Body Fat Loss:  No significant body fat loss     Muscle Mass Loss:  No significant muscle mass loss    Fluid Accumulation:  Mild Extremities (non pitting BLE)   Strength:  Not Performed    Nutrition Assessment:    Continued inadequate oral intakes aeb PO 51-75%. Pt reports feeling better. She requested 8 oz of ensure. SLP saw Pt yesterday and recommended NPO pending MBS results. Spoke with nurse about diet modification. d/c plan to [facility]. Nutrition Related Findings:    + 1 pitting BLE Wound Type: None       Current Nutrition Intake & Therapies:    Average Meal Intake: 51-75%  Average Supplements Intake: %  ADULT DIET; Regular; No Carbonated Beverages  ADULT ORAL NUTRITION SUPPLEMENT; Breakfast, Lunch, Dinner; Standard High Calorie/High Protein Oral Supplement    Anthropometric Measures:  Height: 5' 5\" (165.1 cm)  Ideal Body Weight (IBW): 125 lbs (57 kg)    Admission Body Weight: 157 lb 10 oz (71.5 kg)  Current Body Weight: 166 lb 3.6 oz (75.4 kg), 133.6 % IBW. Weight Source: Bed Scale  Current BMI (kg/m2): 27.7  Usual Body Weight: 143 lb (64.9 kg)  % Weight Change (Calculated): 16.8  Weight Adjustment For: No Adjustment                 BMI Categories: Overweight (BMI 25.0-29. 9)    Estimated Daily Nutrient Needs:  Energy Requirements Based On: Kcal/kg  Weight Used for Energy Requirements: Current  Energy (kcal/day): 9069-7632 (20-23/kg)  Weight Used for Protein Requirements: Ideal  Protein (g/day): 74-91g (1.3-1.6g/kg)  Method Used for Fluid Requirements: ml/Kg  Fluid (ml/day): 1734 ml (23/kg)    Nutrition Diagnosis:   Predicted inadequate energy intake related to impaired respiratory function as evidenced by intake 51-75%    Nutrition Interventions:   Food and/or Nutrient Delivery: Modify Current Diet (SLP recommends NPO, start 8 oz ensure enlive pending MBS results)  Nutrition Education/Counseling: Education declined  Coordination of Nutrition Care: Continue to monitor while inpatient  Plan of Care discussed with: Patient and her     Goals:  Previous Goal Met: Progressing toward Goal(s)  Goals: PO intake 75% or greater     Recent Labs     10/11/22  0520 10/12/22  0530 10/13/22  0615    143 142   K 3.6* 4.0 3.3*    99 101   CO2 31 35* 36*   BUN 7* 8 9   CREATININE 0.47* 0.51 0.52   GLUCOSE 91 86 94   ALT 8 10 11   ALKPHOS 70 81 80      Lab Results   Component Value Date/Time    LABALBU 3.7 10/13/2022 06:15 AM         Nutrition Monitoring and Evaluation:   Behavioral-Environmental Outcomes: Knowledge or Skill  Food/Nutrient Intake Outcomes: Diet Advancement/Tolerance  Physical Signs/Symptoms Outcomes: Biochemical Data, Weight, Fluid Status or Edema    Discharge Planning:     Too soon to determine     Kumar Bianchi, [address], [place]  Contact: [phone]

## 2022-10-13 NOTE — PLAN OF CARE
Problem: Discharge Planning  Goal: Discharge to home or other facility with appropriate resources  Outcome: Progressing  Flowsheets (Taken 10/13/2022 0253)  Discharge to home or other facility with appropriate resources:   Identify barriers to discharge with patient and caregiver   Arrange for needed discharge resources and transportation as appropriate   Identify discharge learning needs (meds, wound care, etc)  Note:  working with patient. Problem: Safety - Adult  Goal: Free from fall injury  Outcome: Progressing  Flowsheets (Taken 10/13/2022 0253)  Free From Fall Injury: Instruct family/caregiver on patient safety     Problem: ABCDS Injury Assessment  Goal: Absence of physical injury  Outcome: Progressing  Flowsheets (Taken 10/13/2022 0253)  Absence of Physical Injury: Implement safety measures based on patient assessment     Problem: Skin/Tissue Integrity  Goal: Absence of new skin breakdown  Description: 1. Monitor for areas of redness and/or skin breakdown  2. Assess vascular access sites hourly  3. Every 4-6 hours minimum:  Change oxygen saturation probe site  4. Every 4-6 hours:  If on nasal continuous positive airway pressure, respiratory therapy assess nares and determine need for appliance change or resting period. Outcome: Progressing  Note: Eusebio scale monitoring per protocol. Inspect skin for breakdown frequently. Encourage pt to make frequent large adjustments in position or assist patient with turning. Document all areas of breakdown.         Problem: Pain  Goal: Verbalizes/displays adequate comfort level or baseline comfort level  Outcome: Progressing  Flowsheets (Taken 10/13/2022 0253)  Verbalizes/displays adequate comfort level or baseline comfort level:   Encourage patient to monitor pain and request assistance   Administer analgesics based on type and severity of pain and evaluate response   Assess pain using appropriate pain scale   Implement non-pharmacological measures as appropriate and evaluate response  Note: Pain assessed every four hours and as needed using 0-10 pain scale. Pt educated on scale and uses scale appropriately. Encourage pt to notify staff of pain before pain becomes uncontrollable. Correlate periods of heavy activity after pain medication administration.  Use pharmacological and non pharmacological methods for pain relief such as: warm blankets, ice, television, reading, or rest.        Problem: Nutrition Deficit:  Goal: Optimize nutritional status  Outcome: Progressing

## 2022-10-13 NOTE — PROGRESS NOTES
[facility] UNIT   APRN - Progress Note    Patient - Kristie Castro  Date of Admission -  10/8/2022 10:06 AM  Date of Evaluation -  10/13/2022  Hospital Day - 5      SUBJECTIVE:     The Kristie Castro is a 69 y.o. female Per nursing report and notes, overnight events include: no significant events. She sitting up in chair alert and no distress. Complains of constipation. ROS:   Constitutional: negative  for fevers, and negative for chills. Respiratory: positive for shortness of breath, positive for cough, and negative for wheezing  Cardiovascular: negative for chest pain, and negative for palpitations  Gastrointestinal: negative for abdominal pain, negative for nausea,negative for vomiting, negative for diarrhea, and positive for constipation    All other systems were reviewed with the patient and are negative unless otherwise stated in HPI.       OBJECTIVE:     VITAL SIGNS:  Patient Vitals for the past 8 hrs:   BP Temp Temp src Pulse Resp SpO2 Height Weight   10/13/22 0838 114/62 -- -- -- -- -- -- --   10/13/22 0730 117/82 97.5 °F (36.4 °C) Temporal 80 17 97 % -- --   10/13/22 0520 -- -- -- 78 18 96 % -- --   10/13/22 0400 -- -- -- -- -- -- 5' 5\" (1.651 m) 166 lb 3.6 oz (75.4 kg)   10/13/22 014 (!) 165/98 96.9 °F (36.1 °C) Temporal 79 18 96 % -- --   10/13/22 0142 -- -- -- -- -- 95 % -- --         Temp: 97.5 °F (36.4 °C)  Temp range:    Temp  Av.3 °F (36.3 °C)  Min: 96.9 °F (36.1 °C)  Max: 97.5 °F (36.4 °C)    BP: 114/62  BP Range:      Systolic (98RIJ), TGM:510 , Min:109 , YGM:600      Diastolic (80CTO), NCC:61, Min:62, Max:98    Heart Rate: 80  Pulse Range:    Pulse  Av.2  Min: 76  Max: 95    Resp: 17  Resp Range:   Resp  Av.1  Min: 17  Max: 20    SpO2: 97 % on supplemental O2  SpO2 range:   SpO2  Av.8 %  Min: 91 %  Max: 98 %    Weight  Wt Readings from Last 3 Encounters:   10/13/22 166 lb 3.6 oz (75.4 kg)   22 143 lb (64.9 kg)   22 143 lb (64.9 kg)     Body mass index is 27.66 kg/m². 24HR INTAKE/OUTPUT:      Intake/Output Summary (Last 24 hours) at 10/13/2022 0928  Last data filed at 10/13/2022 0530  Gross per 24 hour   Intake 1462.64 ml   Output 3350 ml   Net -1887.36 ml     Date 10/13/22 0000 - 10/13/22 2359   Shift 7011-1991 7729-9485 0032-7376 24 Hour Total   INTAKE   P.O. 400   400   Shift Total(mL/kg) 400(5.3)   400(5.3)   OUTPUT   Urine(mL/kg/hr) 2100(3.5)   2100   Shift Total(mL/kg) 6687(47.6)   0636(83.0)   Weight (kg) 75.4 75.4 75.4 75.4         PHYSICAL EXAM:  GEN:    Awake and following commands:     [] No   [x] Yes  MENTAL STATUS: alert and oriented x3. DISTRESS: Acute respiratory distress:       [x] No   [] Yes  EYES:  EOMI, pupils equal   NECK: Supple. No lymphadenopathy. No carotid bruit  CVS:    regular but tachycardic, no audible murmur  PULM: rhonchi and wheeze throughout no acute respiratory distress  ABD:    Bowels sounds normal.  Abdomen is soft. No distention. no tenderness to palpation. EXT:   trace edema bilaterally . No calf tenderness. NEURO: Moves all extremities. Motor and sensory are grossly intact  SKIN:  No rashes. No skin lesions.           MEDICATIONS:  Scheduled Meds:   acetylcysteine  600 mg Inhalation Once    bisacodyl  10 mg Oral Once    magnesium hydroxide  30 mL Oral Once    amLODIPine  10 mg Oral Daily    DULoxetine  60 mg Oral Daily    hydroxychloroquine  300 mg Oral Daily    levETIRAcetam  1,000 mg Oral BID    levothyroxine  200 mcg Oral Daily    pantoprazole  40 mg Oral QAM AC    potassium bicarb-citric acid  20 mEq Oral BID    pregabalin  300 mg Oral BID    sertraline  50 mg Oral Daily    traZODone  200 mg Oral Nightly    tiotropium-olodaterol  2 puff Inhalation Daily    sodium chloride flush  5-40 mL IntraVENous 2 times per day    famotidine  20 mg Oral BID    enoxaparin  40 mg SubCUTAneous Daily    piperacillin-tazobactam  4,500 mg IntraVENous Q8H    levofloxacin  750 mg IntraVENous Q24H    ipratropium-albuterol  3 mL Inhalation 4x daily     Continuous Infusions:   sodium chloride Stopped (10/11/22 0643)     PRN Meds:   polyethylene glycol, 17 g, Daily PRN  guaiFENesin-dextromethorphan, 5 mL, Q4H PRN  HYDROcodone-acetaminophen, 1 tablet, Q6H PRN  Benzocaine-Menthol, 1 lozenge, Q2H PRN  ondansetron, 4 mg, Q8H PRN  sodium chloride flush, 5-40 mL, PRN  sodium chloride, 25 mL, PRN  acetaminophen, 650 mg, Q6H PRN   Or  acetaminophen, 650 mg, Q6H PRN  albuterol, 2.5 mg, Q4H PRN      DATA:  Complete Blood Count:   Recent Labs     10/11/22  0520 10/12/22  0530 10/13/22  0615   WBC 3.1* 3.6 3.0*   RBC 3.53* 3.94* 3.99   HGB 10.9* 12.0 11.9   HCT 34.1* 38.2 37.3   MCV 96.6 97.0 93.5   RDW 13.7 13.2 13.3    209 207   SEGS 54 52 50   NEUTROABS 1.70 1.90 1.49*   LYMPHOPCT 25 27 31   LYMPHSABS 0.78* 0.96* 0.92*   MONOPCT 13* 13* 11   EOSRELPCT 7* 7* 7*   BASOPCT 1 1 1   IMMGRAN 0 0 0        Recent Blood Glucose:   Recent Labs     10/11/22  0520 10/12/22  0530 10/13/22  0615   GLUCOSE 91 86 94        Comprehensive Metabolic Profile:   Recent Labs     10/11/22  0520 10/12/22  0530 10/13/22  0615   BUN 7* 8 9   CREATININE 0.47* 0.51 0.52    143 142   K 3.6* 4.0 3.3*    99 101   MG  --   --  1.9   CALCIUM 9.0 9.7 9.9   ANIONGAP 6* 9 5*   CO2 31 35* 36*   PROT 5.9* 6.6 6.5   LABALBU 3.2* 3.6 3.7   BILITOT 0.2* 0.3 0.3   ALKPHOS 70 81 80   AST 10 13 14   ALT 8 10 11        Urinalysis:   Lab Results   Component Value Date/Time    NITRU NEGATIVE 10/08/2022 11:24 AM    COLORU Yellow 10/08/2022 11:24 AM    PHUR 6.0 10/08/2022 11:24 AM    WBCUA 0 TO 2 09/09/2022 09:29 PM    RBCUA 0 TO 2 09/09/2022 09:29 PM    MUCUS NOT REPORTED 10/14/2021 08:07 PM    TRICHOMONAS NOT REPORTED 10/14/2021 08:07 PM    YEAST NOT REPORTED 10/14/2021 08:07 PM    BACTERIA 1+ 09/09/2022 09:29 PM    SPECGRAV 1.015 10/08/2022 11:24 AM    LEUKOCYTESUR NEGATIVE 10/08/2022 11:24 AM    UROBILINOGEN Normal 10/08/2022 11:24 AM    BILIRUBINUR NEGATIVE 10/08/2022 11:24 AM    GLUCOSEU NEGATIVE 10/08/2022 11:24 AM    KETUA NEGATIVE 10/08/2022 11:24 AM    AMORPHOUS 2+ 09/09/2022 09:29 PM       HgBA1c:    Lab Results   Component Value Date/Time    LABA1C 5.7 06/20/2022 10:00 AM       TSH:    Lab Results   Component Value Date/Time    TSH 11.11 06/20/2022 10:01 AM       Lactic Acid:   Lab Results   Component Value Date/Time    LACTA 1.9 04/28/2022 03:14 PM    LACTA 2.0 02/06/2022 10:51 AM    LACTA 2.5 02/05/2022 03:30 PM        High Sensitivity Troponin:  No results for input(s): TROPHS in the last 72 hours. Pro-BNP:  Lab Results   Component Value Date    PROBNP 117 10/08/2022     D-Dimer:  Lab Results   Component Value Date    DDIMER 0.69 (H) 01/18/2022     PT/INR:    Lab Results   Component Value Date/Time    PROTIME 13.0 10/08/2022 11:01 AM    INR 1.0 10/08/2022 11:01 AM     PTT:    Lab Results   Component Value Date/Time    APTT 28.7 10/08/2022 11:01 AM       CRP: No results for input(s): CRP in the last 72 hours. ABGs:   Lab Results   Component Value Date/Time    PHART 7.332 10/10/2022 05:30 AM    NPE6RJA 58.9 10/10/2022 05:30 AM    PO2ART 92.5 10/10/2022 05:30 AM    DVR7UNU 30.5 10/10/2022 05:30 AM    IEP6VRZ 35 11/02/2020 04:16 AM    W2XPNSNU 96.4 10/10/2022 05:30 AM    FIO2 28 10/10/2022 05:30 AM         Radiology/Imaging:  CT Head W/O Contrast   Final Result   No acute process. No significant change in primarily low-density left   convexity extra-axial collection. XR CHEST PORTABLE   Final Result   Suspected bilateral infectious/inflammatory airways process.                ASSESSMENT / PLAN:     Sepsis due to pneumonia ([address]  Continue current therapy  Continue IV Zosyn  Continue IV Levaquin  Stop IV fluids  Trend labs-improving  Remove Sheikh  Acute on chronic respiratory failure with hypoxia and hypercapnia  Trend ABGs-improving  Wean oxygen  CPAP intemittant  Mucomyst neb x 1  COPD/pulmonary fibrosis  Continue Anoro Ellipta, duo nebs  Hypertension  Continue amlodipine  Chronic pain syndrome  Continue Norco  Nutrition status:   at risk for malnutrition  Dietician consult initiated  [] NPO [] TPN      [] Tube feed [] Clear liquid        [] Full liquid [x] regular diet         [] Fluid restriction   [] Diabetic diet   Prophylaxis:   DVT: Lovenox   Stress Ulcer: H2 Blocker   High risk medications: none   Disposition:    Discharge plan is [place]  She is approved by Insurance at this time        Concha Hazard, APRN - CNP , RYAN-NP-C  10/13/2022  9:28 AM

## 2022-10-13 NOTE — PROGRESS NOTES
[facility]    Facility/Department: [facility] MED SURG    Speech Language Pathology    Dysphagia Therapy.      Haja Juarez    : 1945 (78 y.o.)    MRN: [id number]    ROOM: 3475/6929-04    ADMISSION DATE: 10/8/2022    PATIENT DIAGNOSIS(ES): Confusion [R41.0]  CO2 retention [E87.29]  Sepsis due to pneumonia ([address] [J18.9, A41.9]  Pneumonia due to infectious organism, unspecified laterality, unspecified part of lung [J18.9]    Chief Complaint   Patient presents with    Shortness of Breath     Onset this morning         Patient Active Problem List    Diagnosis Date Noted    Sepsis due to pneumonia ([address] 10/08/2022    Midline shift of brain due to hematoma ([address] 2022    Subdural hematoma 2022    Fall as cause of accidental injury in home as place of occurrence, initial encounter 2022    Pulmonary fibrosis ([address] 2022    A-fib ([address] 2022    Anemia 10/14/2021    Biventricular congestive heart failure ([address]     COPD (chronic obstructive pulmonary disease) ([address] 10/22/2020    Hypothyroidism 10/05/2018    Major depressive disorder 10/05/2018    Chronic midline low back pain without sciatica 10/05/2018    Iron deficiency anemia 10/05/2018       Past Medical History:   Diagnosis Date    A-fib [facility])     Biventricular congestive heart failure (HCC)     Bronchiectasis with acute exacerbation ([address] 2022    CAD (coronary artery disease)     Chronic pain syndrome     dilaudid infusion pump    COPD (chronic obstructive pulmonary disease) (HCC)     Depression     GERD (gastroesophageal reflux disease)     Hypothyroidism     Impaired fasting glucose     Iron deficiency anemia     Osteoporosis     Pulmonary fibrosis ([address] 2022    Subdural hematoma ([address] 2022       Past Surgical History:   Procedure Laterality Date    BACK SURGERY  1998    spinal cord stimulator    BACK SURGERY  1999    infusion pump insertion    BACK SURGERY  10/23/2003    spinal cord stimulator removed    BACK SURGERY  10/23/2003    new infusion pump placed    BACK SURGERY  09/11/2017    replaced infusion pump    CARPAL TUNNEL RELEASE Right 12/17/1999    CARPAL TUNNEL RELEASE Left 11/24/1999    CARPAL TUNNEL RELEASE Right 12/30/2002    CARPAL TUNNEL RELEASE Left 12/17/2003    CERVICAL [address] SURGERY  10/25/2004    anterior cervical diskectomy C7-T1    CERVICAL DISC SURGERY  12/22/2004    anterior cervical discectomy K2-8 (complicated by damaged nerve to vocal cord)    CRANIOTOMY Left 8/23/2022    LEFT CRANIOTOMY FOR SUBDURAL HEMATOMA EVACUATION performed by Goldy Poole DO at [address] [place] [address] Left 12/20/2018    ORIF wrist    HUMERUS FRACTURE SURGERY Right 10/03/2010    HYSTERECTOMY (CERVIX STATUS UNKNOWN)  08/20/1991    KNEE ARTHROSCOPY Right 06/02/2011    KNEE SURGERY Right 02/04/2016    repair distal portion of knee arthroplasty due to prosthesis loosening    LUMBAR [address] SURGERY  02/15/1994    due to scar tissue from previous surgery    LUMBAR DISCECTOMY  08/30/1993    L5-S1    LUMBAR LAMINECTOMY  06/09/2008    L3-4-5    NECK SURGERY  05/03/2002    posterior plate fusion    NECK SURGERY  12/09/2005    reconnect nerve to vocal cord [facility]    TOE AMPUTATION  10/08/2006    left 3rd toe - osteomyelitis    TOE AMPUTATION  03/07/2008    left 4th toe partial amputation    TOE AMPUTATION  10/03/2008    left 2nd toe    TOTAL KNEE ARTHROPLASTY Right 03/19/2021    WRIST FRACTURE SURGERY Left 12/20/2018    WRIST OPEN REDUCTION INTERNAL FIXATION-DISTAL RADIUS performed by Ina Auguste MD at [facility] Left 12/20/2018    WRIST SURGERY Left 07/02/2019    left wrist ulnar shortening osteotomy       No Known Allergies    DATE ONSET: 10/07/22    Date of Evaluation: 10/13/2022    Evaluating Therapist: Laine Cosme, SLP    Dysphagia Diagnosis    Dysphagia Diagnosis: Mild oral stage dysphagia;Mild pharyngeal stage dysphagia    Recommended Diet    Diet Solids Recommendation: Regular    Liquid Consistency Recommendation: Thin    Recommended Form of Meds: PO    Compensatory Swallowing Strategies : Small bites/sips;Eat/Feed slowly;Upright as possible for all oral intake;Remain upright for 30-45 minutes after meals    Reason for Referral    Citlali Curtis was referred for a bedside swallow evaluation to assess the efficiency of her swallow function, identify signs and symptoms of aspiration, identify risk factors, and make recommendations regarding safe dietary consistencies, effective compensatory strategies, and safe eating environment. Prior Dysphagia History  Patient previusly had MBS 8/30/22 with recommendation of NPO due to silent aspiration with all consistencies. General    Chart Reviewed: Yes  Behavior/Cognition: Alert; Cooperative;Pleasant mood  Respiratory Status: O2 via nasual cannula  O2 Device: Nasal cannula  Communication Observation: Functional  Follows Directions: Simple  Dentition: Adequate  Patient Positioning: Upright in chair  Baseline Vocal Quality: Hoarse  Volitional Cough: Strong  Consistencies Administered: Soft and Bite-Sized;Pureed; Thin - straw; Thin - cup    Vision and Hearing    Vision  Vision: Impaired  Vision Exceptions: Wears glasses for reading  Hearing  Hearing: Within functional limits    Current Diet level    Current Diet : Regular    Oral Motor    Labial: No impairment  Oral Hygiene: Moist  Lingual: No impairment  Velum: No Impairment  Mandible: No impairment    Oral/Pharyngeal Phase    Oral Phase - Comment: Patient demonstrated mildly prolonged mastication with soft solid consitencies, but no oral residues noted post-swallow. Pharyngeal Phase: Patient demonstrated reduced laryngeal elevation upon palpation. Patient also demonstrated delayed cough x 1/7 trials of thin liquids and delayed throat clear x 1/8 trails puree solids.  Patient utilized chin tuck with mod A    PO Trials  Vocal Quality: No Impairment  How Presented: Self-fed/presented  Bolus Acceptance: No impairment  Bolus Formation/Control: Impaired  Type of Impairment: Mastication;Delayed  Propulsion: Delayed (# of seconds); Discoordination  Oral Residue: None  Initiation of Swallow: Delayed (# of seconds) (Delayed with mixed consistencies)  Laryngeal Elevation: Decreased  Aspiration Signs/Symptoms: Delayed cough/throat clear      Dysphagia Diagnosis    Dysphagia Diagnosis: Mild oral stage dysphagia;Mild pharyngeal stage dysphagia    Dysphagia Outcome Severity Scale: Level 5: Mild dysphagia- Distant supervision. May need one diet consistency restricted    Recommendations    Requires SLP Intervention: Yes  D/C Recommendations: Ongoing speech therapy is recommended at next level of care  Diet Solids Recommendation: Regular  Liquid Consistency Recommendation: Thin  Compensatory Swallowing Strategies : Small bites/sips;Eat/Feed slowly;Upright as possible for all oral intake;Remain upright for 30-45 minutes after meals  Recommended Form of Meds: PO  Therapeutic Interventions: Diet tolerance monitoring;Pharyngeal exercises  Frequency of Treatment: Daily during inpatient stay    Prognosis    Prognosis: [facility]    Education    Individuals consulted  Consulted and agree with results and recommendations: Patient    Patient Education: Educated on results of evaluation, diet recommendations, and compensatory swallowing strategies    Treatment/Goals    Short-term Goals  Timeframe for Short-term Goals: 7 days  Goal 1: Patient will utilize compensatory swallowing strategies during a snack or meal with 80% accuracy given min A  Goal 2: Patient will consume regular solid and thin liquids without overt s/sx of asp/pen with 80% accuracy  Goal 3: Patient will complete oropharyngeal exercises 10-15x each  Long-term Goals  Timeframe for Long-term Goals: 14 days  Goal 1: Pt will tolerate least restrictive diet without s/s of aspiration during inpatient stay.     Safety Devices    Safety Devices  Safety Devices in place: Yes  Type of devices: Left in chair; All fall risk precautions in place;Call light within reach; Chair alarm in place  Restraints Initially in Place: No    Pain Assessment    Pain Assessment: Patient does not c/o pain      Therapy Time    SLP Individual Minutes  Time In: 1300  Time Out: 1320  Minutes: 20       Patient seen in room for dysphagia therapy this date. Patient re-educated on results of MBSS. Patient seen during portion of dinner meal. Patient demonstrated mildly prolonged mastication with soft solid consitencies, but no oral residues noted post-swallow. Patient demonstrated reduced laryngeal elevation upon palpation. Patient also demonstrated delayed cough x 1/7 trials of thin liquids and delayed throat clear x 1/8 trails puree solids. Patient utilized chin tuck with mod A. ST recommends diet of regular solids (with extra moisture for dry foods) and thin liquids. Compensatory swallowing strategies should include: small bites/sips, pacing/slow rate, alternate bites/sips, upright during and 30-45 minutes after the swallow. ST will continue to follow during inpatient stay.        Electronically signed: Page Ko M.S. [id number] [facility]              10/13/2022

## 2022-10-13 NOTE — PROGRESS NOTES
Facsimile Transmission Cover Sheet    Information contained in this transmission is for the sole use of the intended recipients and may contain confidential and privileged information. Any unauthorized review, use, disclosure or distribution is prohibited. If you are not the intended recipient, please contact the sender and destroy all copies of the original message. Disclosure is made for the purpose of healthcare operations and continuity of care. To: __Dr. Moraes________________________    From: Speech Therapy       Sender:_Ashley Black M.S. Alethea Primrose  _________________ Phone Number: [phone] (Mercedes Yazminronna)    Figueroa Le current unit  [x][facility] [phone]  [phone]    Your bedside evaluation order for Figueroa Le has been completed. Based on the results speech therapy recommends:     Diet of Regular Solids/Thin liquids with use of chin tuck. 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 If you agree please enter the new diet order in [facility]ON[facility] or telephone the nursing unit. Diet will not change without your order.    Thank you,  Electronically signed by: Pastora Lockwood

## 2022-10-13 NOTE — PROGRESS NOTES
RESPIRATORY ASSESSMENT PROTOCOL                                                                                              Patient Name: Paul Whitehead Room#: [id number] : 1945     Admitting diagnosis: Confusion [R41.0]  CO2 retention [E87.29]  Sepsis due to pneumonia ([facility] [J18.9, A41.9]  Pneumonia due to infectious organism, unspecified laterality, unspecified part of lung [J18.9]       Medical History:   Past Medical History:   Diagnosis Date    A-fib ([place] [address].)     Biventricular congestive heart failure (HCC)     Bronchiectasis with acute exacerbation ([place] [address] 2022    CAD (coronary artery disease)     Chronic pain syndrome     dilaudid infusion pump    COPD (chronic obstructive pulmonary disease) ([facility])     Depression     GERD (gastroesophageal reflux disease)     Hypothyroidism     Impaired fasting glucose     Iron deficiency anemia     Osteoporosis     Pulmonary fibrosis ([place] [address] 2022    Subdural hematoma ([place] [address] 2022       PATIENT ASSESSMENT    LABORATORY DATA  Hematology:   Lab Results   Component Value Date/Time    WBC 3.0 10/13/2022 06:15 AM    RBC 3.99 10/13/2022 06:15 AM    HGB 11.9 10/13/2022 06:15 AM    HCT 37.3 10/13/2022 06:15 AM     10/13/2022 06:15 AM     Chemistry:    Lab Results   Component Value Date/Time    PHART 7.332 10/10/2022 05:30 AM    JCF2NBJ 58.9 10/10/2022 05:30 AM    PO2ART 92.5 10/10/2022 05:30 AM    M2XCGCZC 96.4 10/10/2022 05:30 AM    UPL8EJY 30.5 10/10/2022 05:30 AM    PBEA 2.9 10/10/2022 05:30 AM       VITALS  Heart Rate: 86   Resp: 16  BP: 116/71  SpO2: 97 % O2 Device: Nasal cannula  Temp: 98.1 °F (36.7 °C)    SKIN COLOR  [x] Normal  [] Pale  [] Dusky  [] Cyanotic    RESPIRATORY PATTERN  [x] Normal  [] Dyspnea  [] Cheyne-Granger  [] Kussmaul  [] Biots    AMBULATORY  [] Yes  [x] No  [] With Assistance      Patient Acuity 0 1 2 3 4 Score   Level of Concious (LOC) [x]  Alert & Oriented or Pt normal LOC []  Confused;follows directions []  Confused & uncooper-ative []  Obtunded []  Comatose 0   Respiratory Rate  (RR) []  Reg. rate & pattern. 12 - 20 bpm  []  Increased RR. Greater than 20 bpm   [x]  SOB w/ exertion or RR greater than 24 bpm []  Access- ory muscle use at rest. Abn.  resp. []  SOB at rest.   2   Bilateral Breath Sounds (BBS) []  Clear []  Diminish-ed bases  [x]  Diminish-ed t/o, or rales   []  Sporadic, scattered wheezes or rhonchi []  Persistentwheezes and, or absent BBS 2   Cough []  Strong, effective, & non-prod. [x]  Effective & prod. Less than 25 ml (2 TBSP) over past 24 hrs []  Ineffective & non-prod to less than 25 ML over past 24 hrs []  Ineffective and, or greater than 25 ml sputum prod. past 24 hrs. []  Nonspon- taneous; Requires suctioning 1   Pulmonary History  (PULM HX) []  No smoking and no chronic pulmonaryhistory []  Former smoker. Quit over 12 mos. ago []  Current smoker or quit w/ in 12 mos []  Pulm. History and, or 20 pk/yr smoking hx [x]  Admitted w/ acute pulm. dx and, or has been admitted w/ pulm. dx 2 or more times over past 12 mos 4   Surgical History this Admit  (SURG HX) [x]  No surgery []  General surgery []  Lower abdominal []  Thoracic or upper abdominal   []  Thoracic w/ pulm. disease 0   Chest X-Ray (CXR)/CT Scan []  Clear or not applicable []  Not available []  Atelect- asis or pleural effusions []  Localized infiltrate or pulm. edema [x]  Con-solidated Infiltrates, bilateral, or in more than 1 lobe 4   Slow or Forced VC, FEV1 OR PEFR (PULM FXN)  [x]  80% or greater, or not indicated []  Pt. unable to perform []  FEV1 or PEFR or VC 51-79%.   []  FEV1 or PEFR or VC  30-49%   []  FEV1 or PEFR or VC less than 30%   0   TOTAL ACUITY: 13       CARE PLAN    If Acuity Level is 2, 3, or 4 in any of the following:    [x] BILATERAL BREATH SOUNDS (BBS)     [x] PULMONARY HISTORY (PULM HX)  [] PULMONARY FUNCTION (PULM FX)    Goal: Improve respiratory functions in patients with airway disease and decrease WOB    [x] AEROSOL PROTOCOL    Total Acuity:   16-32  []  Secondary Assessment in 24 hrs Total Acuity:  9-15  [x]  Secondary Assessment in 24 hrs Total Acuity:  4-8  []  Secondary Assessment in 48 hrs Total Acuity:  0-3  []  Secondary Assessment in 72 hrs   HHN AEROSOL THERAPY with  [physician-ordered bronchodilator(s)] q 4 & Albuterol PRN q2 hrs. Breath-Actuated Neb if BBS Acuity = 4, and pt. can use MP. Notify physician if condition deteriorates. HHN AEROSOL THERAPY with  [physician-ordered bronchodilator(s)]  QID and Albuterol PRN q4 hrs. Breath-Actuated Neb if BBS Acuity = 4, and pt. can use MP. Notify physician if condition deteriorates. MDI THERAPY with  2 actuations of [physician-ordered bronchodilator(s)] via spacer TID Albuterol and PRNq4 hrs. If unable to utilize MDI: HHN [physician-ordered bronchodilator(s)] TID and Albuterol PRN q4 hrs. Notify physician if condition deteriorates. MDI THERAPY with  [physician-ordered bronchodilator(s)] via spacer TID PRN. If unable to utilize MDI: HHN [physician-ordered bronchodilator(s)] TID PRN. Notify physician if condition deteriorates. If Acuity Level is 2, 3, or 4 in any of the following:    [] COUGH     [] SURGICAL HISTORY (SURG HX)  [x] CHEST XRAY (CXR)    Goal: Improvement in sputum mobilization in patients with ineffective airway clearance. Reverse atelectasis.     [x] Bronchopulmonary Hygiene Protocol    Total Acuity:   16-32  []  Secondary Assessment in 24 hrs Total Acuity:  9-15  [x]  Secondary Assessment in 24 hrs Total Acuity:  4-8  []  Secondary Assessment in 48 hrs Total Acuity:  0-3  []  Secondary Assessment in 72 hrs   METANEB QID with [physician-ordered bronchodilator(s)] if CXR Acuity = 4; otherwise:  PD&P, PEP, or Vest QID & PRN  NT Sxn PRN for ineffective cough  METANEB QID with [physician-ordered bronchodilator(s)] if CXR Acuity = 4; otherwise:  PD&P, PEP, or Vest TID & PRN  NT Sxn PRN for ineffective cough  Instruct patient to self-perform IS q1hr WA   Directed Cough self-performed q1hr WA     If Acuity Level is 2 or above in the following:    [] PULMONARY HISTORY (PULM HX)    Goal: Assist patient in quitting smoking to slow or stop the progression of lung disease.     [] Smoking Cessation Protocol    SMOKING CESSATION EDUCATION provided according to policy JI_428: (marlyn with an X)  ____Yes    ____ No     ____ NA    Smoking Cessation Booklet given:  ____Yes  ____No ____Patient Chikis Blanca

## 2022-10-13 NOTE — PROGRESS NOTES
Patient taken to bathroom via masoud kyle, after returning to chair patient complained of sob, oxygen was increased to 2L via NC and respiratory was called.

## 2022-10-13 NOTE — PROGRESS NOTES
Writer at bedside at this time to perform second shift assessment. Writer assisted patient to bedside commode and back to bed at this time. Vital signs taken and assessment completed at this time. SpO2 96% on 1L NC. Patient is alert and oriented x4 and has complaint of a headache 9/10; PRN tylenol given. Patient denies any further needs at this time. Call light within reach, bed alarm on for safety, will continue to monitor.

## 2022-10-13 NOTE — PROCEDURES
INSTRUMENTAL SWALLOW REPORT  MODIFIED BARIUM SWALLOW    NAME: Tawny Garcia   : 1945  MRN: [id number]       Date of Eval: 10/13/2022          Past Medical History:  has a past medical history of A-fib ([address] Biventricular congestive heart failure ([address] Bronchiectasis with acute exacerbation ([address] CAD (coronary artery disease), Chronic pain syndrome, COPD (chronic obstructive pulmonary disease) ([address] Depression, GERD (gastroesophageal reflux disease), Hypothyroidism, Impaired fasting glucose, Iron deficiency anemia, Osteoporosis, Pulmonary fibrosis ([address] and Subdural hematoma ([address] Past Surgical History:  has a past surgical history that includes Hysterectomy (1991); Carpal tunnel release (Right, 1999); Total knee arthroplasty (Right, 2021); fracture surgery (Left, 2018); Wrist fracture surgery (Left, 2018); lumbar discectomy (1993); Lumbar disc surgery (02/15/1994); back surgery (1998); back surgery (1999); Carpal tunnel release (Left, 1999); Neck surgery (2002); Carpal tunnel release (Right, 2002); Carpal tunnel release (Left, 2003); back surgery (10/23/2003); back surgery (10/23/2003); Cervical disc surgery (10/25/2004); Cervical disc surgery (2004); Neck surgery (2005); Toe amputation (10/08/2006); Toe amputation (2008); lumbar laminectomy (2008); Toe amputation (10/03/2008); Humerus fracture surgery (Right, 10/03/2010); Knee arthroscopy (Right, 2011); back surgery (2017); knee surgery (Right, 2016); Wrist fracture surgery (Left, 2018); Wrist surgery (Left, 2019); and craniotomy (Left, 2022). Type of Study: Repeat MBS  Results of Prior Study: Patient's previous results of MBSS (22) indicated silent aspiration with all consistencies. It was recommended that patient remain NPO at thae time.     Recent CXR/CT of Chest: Date 10/08/22  Impression   No acute process. No significant change in primarily low-density left   convexity extra-axial collection. Patient Complaints/Reason for Referral:  Arianna Chen was referred for a MBS to assess the efficiency of his/her swallow function, assess for aspiration, and to make recommendations regarding safe dietary consistencies, effective compensatory strategies, and safe eating environment. Onset of problem: Patient admitted for pneumonia from the [facility]. Patient most recently had MBSS on 8/30/22. It was recommended that patient be NPO due to noted silent aspiration on study. Patient reports she was upgraded back to Regular and Thin at the [facility] and had not had repeat MBSS. MBSS completed this date to rule out silent aspiration and objectively assess pharyngeal phase of swallow. Behavior/Cognition/Vision/Hearing:  Behavior/Cognition: Alert; Cooperative;Pleasant mood  Vision: Impaired  Vision Exceptions: Wears glasses for reading  Hearing: Within functional limits    Impressions:  Treatment Dx and ICD 10: r13.12 Oropharyngeal Dysphagia. 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 May need one diet consistency restricted  Penetration-Aspiration Scale (PAS): 2 - Material enters the airway, remains above the vocal folds, and is ejected from the airway    Recommended Diet:  Solid consistency: Regular  Liquid consistency:  Thin       Medication administration: PO    Safe Swallow Protocol:  Supervision: Distant  Compensatory Swallowing Strategies : Small bites/sips;Eat/Feed slowly;Upright as possible for all oral intake;Remain upright for 30-45 minutes after meals        Recommendations/Treatment  Requires SLP Intervention: Yes        D/C Recommendations: Ongoing speech therapy is recommended at next level of care  Postural Changes and/or Swallow Maneuvers: Chin tuck;Upright 30 min after meal;Upright 90 degrees      Recommended Exercises:    Therapeutic Interventions: Patient/Family education         Education: Images and recommendations were reviewed with the patient following this exam.   Patient Education: Educated on results of evaluation, diet recommendations, and compensatory swallowing strategies  Patient Education Response: Verbalizes understanding    Safety Devices  Restraints Initially in Place: No      Goals:    Long Term:   Goal 1: Pt will tolerate least restrictive diet without s/s of aspiration during inpatient stay. Short Term:  Goal 1: Pt will complete MBSS to rule out aspiration and determine plan of care. Oral Preparation / Oral Phase  Patient presents with mild oral phase dysphagia characterized by impaired mastication with predominantly munching pattern. Patient also demonstrated reduced bolus control with mixed consistency solids. Pharyngeal Phase   Patient presents with mild pharyngeal phase dysphagia. Patient demonstrated premature spillage to the pyriform sinuses with mixed-consistency soft and bite-sized solids. Additionally, patient demonstrated moderate-severely reduced hyolaryngeal elevation and excursion and reduced pharyngeal stripping wave. Patient demonstrated flash penetration with thin liquids, but no aspiration was observed with any consistencies trialed. 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 These resulted in moderate esophageal residues to collect post-swallow with all consistencies. Portions of bolus were able to be cleared with a chin tuck and effortful swallow.         Pain   Pain Level: 8  Pain Type: Chronic pain  Pain Location: Back, Neck, Leg    Patient admitted for pneumonia from the [facility]. Patient most recently had MBSS on 8/30/22. It was recommended that patient be NPO due to noted silent aspiration on study. Patient reports she was upgraded back to Regular and Thin at the [facility] and had not had repeat MBSS. MBSS completed this date to rule out silent aspiration and objectively assess pharyngeal phase of swallow. Patient presents with mild oral phase dysphagia characterized by impaired mastication with predominantly munching pattern. Patient also demonstrated reduced bolus control with mixed consistency solids. Patient presents with mild pharyngeal phase dysphagia. Patient demonstrated premature spillage to the pyriform sinuses with mixed-consistency soft and bite-sized solids. Additionally, patient demonstrated moderate-severely reduced hyolaryngeal elevation and excursion and reduced pharyngeal stripping wave. Patient demonstrated flash penetration with thin liquids, but no aspiration was observed with any consistencies trialed. 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 These resulted in moderate esophageal residues to collect post-swallow with all consistencies. Portions of bolus were able to be cleared with a chin tuck and effortful swallow. Patient was also placed AP, there did not appear to be one clear side of residue in diverticulum. ST recommends diet of regular solids (with extra moisture for dry foods) and thin liquids. Compensatory swallowing strategies should include: small bites/sips, pacing/slow rate, alternate bites/sips, upright during and 30-45 minutes after the swallow. ST recommends dysphagia therapy to implement compensatory swallowing strategies and improve pharyngeal stripping wave and hyolaryngeal elevation and excursion.      Therapy Time:   Individual   Time In 1300   Time Out 1320   Minutes [address] [employer] [place], [place], 10/13/2022, 3:33 PM

## 2022-10-13 NOTE — PROGRESS NOTES
Patient A&Ox4, calm and cooperative. Assessment and vital signs completed, see flowsheet. Patient reports pain 8/10 to back, aching. Patient seems to have occasional short term memory loss moments. Rhonchi in lungs throughout. Dyspnea with exertion. +1 pitting edema, weak and unsteady in BLE. Bowel sounds are distant, no significant BM since 10/9/22. Bloating and constipation noted. Patient resting in chair, call light within reach, denies further needs, will continue to monitor.

## 2022-10-14 ENCOUNTER — APPOINTMENT (OUTPATIENT)
Dept: GENERAL RADIOLOGY | Age: 77
DRG: 871 | End: 2022-10-14
Payer: MEDICARE

## 2022-10-14 LAB
ABSOLUTE EOS #: 0.09 K/UL (ref 0–0.44)
ABSOLUTE IMMATURE GRANULOCYTE: <0.03 K/UL (ref 0–0.3)
ABSOLUTE LYMPH #: 0.52 K/UL (ref 1.1–3.7)
ABSOLUTE MONO #: 0.21 K/UL (ref 0.1–1.2)
ALBUMIN SERPL-MCNC: 4 G/DL (ref 3.5–5.2)
ALBUMIN/GLOBULIN RATIO: 1.3 (ref 1–2.5)
ALLEN TEST: ABNORMAL
ALP BLD-CCNC: 87 U/L (ref 35–104)
ALT SERPL-CCNC: 15 U/L (ref 5–33)
ANION GAP SERPL CALCULATED.3IONS-SCNC: 8 MMOL/L (ref 9–17)
AST SERPL-CCNC: 18 U/L
BASOPHILS # BLD: 1 % (ref 0–2)
BASOPHILS ABSOLUTE: 0.03 K/UL (ref 0–0.2)
BILIRUB SERPL-MCNC: 0.2 MG/DL (ref 0.3–1.2)
BUN BLDV-MCNC: 11 MG/DL (ref 8–23)
BUN/CREAT BLD: 19 (ref 9–20)
CALCIUM SERPL-MCNC: 10.4 MG/DL (ref 8.6–10.4)
CHLORIDE BLD-SCNC: 102 MMOL/L (ref 98–107)
CO2: 33 MMOL/L (ref 20–31)
CREAT SERPL-MCNC: 0.59 MG/DL (ref 0.5–0.9)
EOSINOPHILS RELATIVE PERCENT: 2 % (ref 1–4)
GFR SERPL CREATININE-BSD FRML MDRD: >60 ML/MIN/1.73M2
GLUCOSE BLD-MCNC: 90 MG/DL (ref 70–99)
HCO3 ARTERIAL: 31.1 MMOL/L (ref 22–26)
HCT VFR BLD CALC: 38.7 % (ref 36.3–47.1)
HEMOGLOBIN: 12.5 G/DL (ref 11.9–15.1)
IMMATURE GRANULOCYTES: 0 %
LYMPHOCYTES # BLD: 10 % (ref 24–43)
MCH RBC QN AUTO: 30.8 PG (ref 25.2–33.5)
MCHC RBC AUTO-ENTMCNC: 32.3 G/DL (ref 28.4–34.8)
MCV RBC AUTO: 95.3 FL (ref 82.6–102.9)
MONOCYTES # BLD: 4 % (ref 3–12)
NRBC AUTOMATED: 0 PER 100 WBC
O2 DEVICE/FLOW/%: ABNORMAL
O2 SAT, ARTERIAL: 97.4 % (ref 95–98)
PATIENT TEMP: 37
PCO2 ARTERIAL: 51.4 MMHG (ref 35–45)
PDW BLD-RTO: 13.4 % (ref 11.8–14.4)
PH ARTERIAL: 7.4 (ref 7.35–7.45)
PLATELET # BLD: 229 K/UL (ref 138–453)
PMV BLD AUTO: 10.2 FL (ref 8.1–13.5)
PO2 ARTERIAL: 99 MMHG (ref 80–100)
POSITIVE BASE EXCESS, ART: 5 MMOL/L (ref 0–2)
POTASSIUM SERPL-SCNC: 3.9 MMOL/L (ref 3.7–5.3)
PT. POSITION: ABNORMAL
RBC # BLD: 4.06 M/UL (ref 3.95–5.11)
SAMPLE SITE: ABNORMAL
SEG NEUTROPHILS: 83 % (ref 36–65)
SEGMENTED NEUTROPHILS ABSOLUTE COUNT: 4.13 K/UL (ref 1.5–8.1)
SODIUM BLD-SCNC: 143 MMOL/L (ref 135–144)
TOTAL PROTEIN: 7.1 G/DL (ref 6.4–8.3)
WBC # BLD: 5 K/UL (ref 3.5–11.3)

## 2022-10-14 PROCEDURE — 6360000002 HC RX W HCPCS: Performed by: INTERNAL MEDICINE

## 2022-10-14 PROCEDURE — 2580000003 HC RX 258: Performed by: INTERNAL MEDICINE

## 2022-10-14 PROCEDURE — 6370000000 HC RX 637 (ALT 250 FOR IP): Performed by: INTERNAL MEDICINE

## 2022-10-14 PROCEDURE — 1200000000 HC SEMI PRIVATE

## 2022-10-14 PROCEDURE — 97116 GAIT TRAINING THERAPY: CPT

## 2022-10-14 PROCEDURE — 85025 COMPLETE CBC W/AUTO DIFF WBC: CPT

## 2022-10-14 PROCEDURE — 2700000000 HC OXYGEN THERAPY PER DAY

## 2022-10-14 PROCEDURE — 97110 THERAPEUTIC EXERCISES: CPT

## 2022-10-14 PROCEDURE — 94660 CPAP INITIATION&MGMT: CPT

## 2022-10-14 PROCEDURE — 94669 MECHANICAL CHEST WALL OSCILL: CPT

## 2022-10-14 PROCEDURE — 6370000000 HC RX 637 (ALT 250 FOR IP): Performed by: NURSE PRACTITIONER

## 2022-10-14 PROCEDURE — 97530 THERAPEUTIC ACTIVITIES: CPT

## 2022-10-14 PROCEDURE — 36600 WITHDRAWAL OF ARTERIAL BLOOD: CPT

## 2022-10-14 PROCEDURE — 94664 DEMO&/EVAL PT USE INHALER: CPT

## 2022-10-14 PROCEDURE — 92526 ORAL FUNCTION THERAPY: CPT

## 2022-10-14 PROCEDURE — 71046 X-RAY EXAM CHEST 2 VIEWS: CPT

## 2022-10-14 PROCEDURE — 94640 AIRWAY INHALATION TREATMENT: CPT

## 2022-10-14 PROCEDURE — 94761 N-INVAS EAR/PLS OXIMETRY MLT: CPT

## 2022-10-14 PROCEDURE — 36415 COLL VENOUS BLD VENIPUNCTURE: CPT

## 2022-10-14 PROCEDURE — 80053 COMPREHEN METABOLIC PANEL: CPT

## 2022-10-14 PROCEDURE — 82805 BLOOD GASES W/O2 SATURATION: CPT

## 2022-10-14 RX ORDER — HYDROCODONE BITARTRATE AND ACETAMINOPHEN 10; 325 MG/1; MG/1
1 TABLET ORAL EVERY 4 HOURS PRN
Status: DISCONTINUED | OUTPATIENT
Start: 2022-10-14 | End: 2022-10-18 | Stop reason: HOSPADM

## 2022-10-14 RX ORDER — METHYLPREDNISOLONE SODIUM SUCCINATE 125 MG/2ML
60 INJECTION, POWDER, LYOPHILIZED, FOR SOLUTION INTRAMUSCULAR; INTRAVENOUS EVERY 8 HOURS
Status: DISCONTINUED | OUTPATIENT
Start: 2022-10-14 | End: 2022-10-18 | Stop reason: HOSPADM

## 2022-10-14 RX ADMIN — IPRATROPIUM BROMIDE AND ALBUTEROL SULFATE 3 ML: 2.5; .5 SOLUTION RESPIRATORY (INHALATION) at 15:29

## 2022-10-14 RX ADMIN — SODIUM CHLORIDE, PRESERVATIVE FREE 10 ML: 5 INJECTION INTRAVENOUS at 16:21

## 2022-10-14 RX ADMIN — BENZOCAINE 6 MG-MENTHOL 10 MG LOZENGES 1 LOZENGE: at 16:27

## 2022-10-14 RX ADMIN — FAMOTIDINE 20 MG: 20 TABLET ORAL at 08:25

## 2022-10-14 RX ADMIN — LEVOTHYROXINE SODIUM 200 MCG: 100 TABLET ORAL at 07:35

## 2022-10-14 RX ADMIN — IPRATROPIUM BROMIDE AND ALBUTEROL SULFATE 3 ML: 2.5; .5 SOLUTION RESPIRATORY (INHALATION) at 05:39

## 2022-10-14 RX ADMIN — PREGABALIN 300 MG: 75 CAPSULE ORAL at 08:25

## 2022-10-14 RX ADMIN — POTASSIUM BICARBONATE 20 MEQ: 782 TABLET, EFFERVESCENT ORAL at 21:06

## 2022-10-14 RX ADMIN — TRAZODONE HYDROCHLORIDE 200 MG: 50 TABLET ORAL at 20:54

## 2022-10-14 RX ADMIN — METHYLPREDNISOLONE SODIUM SUCCINATE 60 MG: 125 INJECTION, POWDER, FOR SOLUTION INTRAMUSCULAR; INTRAVENOUS at 07:35

## 2022-10-14 RX ADMIN — GUAIFENESIN AND DEXTROMETHORPHAN 5 ML: 100; 10 SYRUP ORAL at 20:54

## 2022-10-14 RX ADMIN — GUAIFENESIN AND DEXTROMETHORPHAN 5 ML: 100; 10 SYRUP ORAL at 16:21

## 2022-10-14 RX ADMIN — Medication 1000 MG: at 08:26

## 2022-10-14 RX ADMIN — ENOXAPARIN SODIUM 40 MG: 100 INJECTION SUBCUTANEOUS at 08:25

## 2022-10-14 RX ADMIN — PREGABALIN 300 MG: 75 CAPSULE ORAL at 20:54

## 2022-10-14 RX ADMIN — METHYLPREDNISOLONE SODIUM SUCCINATE 60 MG: 125 INJECTION, POWDER, FOR SOLUTION INTRAMUSCULAR; INTRAVENOUS at 16:20

## 2022-10-14 RX ADMIN — DULOXETINE HYDROCHLORIDE 60 MG: 60 CAPSULE, DELAYED RELEASE ORAL at 08:25

## 2022-10-14 RX ADMIN — Medication 1000 MG: at 20:54

## 2022-10-14 RX ADMIN — POTASSIUM BICARBONATE 20 MEQ: 782 TABLET, EFFERVESCENT ORAL at 08:25

## 2022-10-14 RX ADMIN — SERTRALINE HYDROCHLORIDE 50 MG: 50 TABLET ORAL at 08:25

## 2022-10-14 RX ADMIN — PIPERACILLIN AND TAZOBACTAM 4500 MG: 4; .5 INJECTION, POWDER, LYOPHILIZED, FOR SOLUTION INTRAVENOUS at 21:04

## 2022-10-14 RX ADMIN — HYDROCODONE BITARTRATE AND ACETAMINOPHEN 1 TABLET: 10; 325 TABLET ORAL at 07:35

## 2022-10-14 RX ADMIN — PIPERACILLIN AND TAZOBACTAM 4500 MG: 4; .5 INJECTION, POWDER, LYOPHILIZED, FOR SOLUTION INTRAVENOUS at 04:23

## 2022-10-14 RX ADMIN — HYDROCODONE BITARTRATE AND ACETAMINOPHEN 1 TABLET: 10; 325 TABLET ORAL at 20:54

## 2022-10-14 RX ADMIN — PIPERACILLIN AND TAZOBACTAM 4500 MG: 4; .5 INJECTION, POWDER, LYOPHILIZED, FOR SOLUTION INTRAVENOUS at 11:53

## 2022-10-14 RX ADMIN — IPRATROPIUM BROMIDE AND ALBUTEROL SULFATE 3 ML: 2.5; .5 SOLUTION RESPIRATORY (INHALATION) at 11:24

## 2022-10-14 RX ADMIN — SODIUM CHLORIDE, PRESERVATIVE FREE 10 ML: 5 INJECTION INTRAVENOUS at 08:25

## 2022-10-14 RX ADMIN — TIOTROPIUM BROMIDE AND OLODATEROL 2 PUFF: 3.124; 2.736 SPRAY, METERED RESPIRATORY (INHALATION) at 11:24

## 2022-10-14 RX ADMIN — LEVOFLOXACIN 750 MG: 5 INJECTION, SOLUTION INTRAVENOUS at 12:41

## 2022-10-14 RX ADMIN — PANTOPRAZOLE SODIUM 40 MG: 40 TABLET, DELAYED RELEASE ORAL at 08:29

## 2022-10-14 RX ADMIN — IPRATROPIUM BROMIDE AND ALBUTEROL SULFATE 3 ML: 2.5; .5 SOLUTION RESPIRATORY (INHALATION) at 21:18

## 2022-10-14 RX ADMIN — FAMOTIDINE 20 MG: 20 TABLET ORAL at 20:54

## 2022-10-14 RX ADMIN — HYDROXYCHLOROQUINE SULFATE 300 MG: 200 TABLET ORAL at 08:25

## 2022-10-14 RX ADMIN — AMLODIPINE BESYLATE 10 MG: 10 TABLET ORAL at 08:25

## 2022-10-14 RX ADMIN — HYDROCODONE BITARTRATE AND ACETAMINOPHEN 1 TABLET: 10; 325 TABLET ORAL at 16:20

## 2022-10-14 RX ADMIN — SODIUM CHLORIDE, PRESERVATIVE FREE 10 ML: 5 INJECTION INTRAVENOUS at 20:55

## 2022-10-14 ASSESSMENT — PAIN DESCRIPTION - LOCATION
LOCATION: HEAD;BACK
LOCATION: BACK

## 2022-10-14 ASSESSMENT — PAIN DESCRIPTION - DESCRIPTORS
DESCRIPTORS: ACHING
DESCRIPTORS: DISCOMFORT;ACHING

## 2022-10-14 ASSESSMENT — PAIN SCALES - GENERAL
PAINLEVEL_OUTOF10: 8
PAINLEVEL_OUTOF10: 9
PAINLEVEL_OUTOF10: 8
PAINLEVEL_OUTOF10: 9
PAINLEVEL_OUTOF10: 4

## 2022-10-14 ASSESSMENT — PAIN DESCRIPTION - PAIN TYPE
TYPE: CHRONIC PAIN
TYPE: CHRONIC PAIN
TYPE: ACUTE PAIN;CHRONIC PAIN

## 2022-10-14 ASSESSMENT — PAIN DESCRIPTION - ONSET
ONSET: ON-GOING
ONSET: ON-GOING

## 2022-10-14 ASSESSMENT — PAIN DESCRIPTION - ORIENTATION
ORIENTATION: LOWER
ORIENTATION: LEFT;MID
ORIENTATION: LOWER

## 2022-10-14 ASSESSMENT — PAIN DESCRIPTION - FREQUENCY
FREQUENCY: CONTINUOUS

## 2022-10-14 ASSESSMENT — PAIN - FUNCTIONAL ASSESSMENT
PAIN_FUNCTIONAL_ASSESSMENT: ACTIVITIES ARE NOT PREVENTED
PAIN_FUNCTIONAL_ASSESSMENT: ACTIVITIES ARE NOT PREVENTED

## 2022-10-14 NOTE — PROGRESS NOTES
Patient assisted up to the bathroom via stand up lift at this time. Patient assisted into bed and got comfortable with pillow support. Vital signs and assessment completed. Patient rates her pain 8/10 at this time. Patient states she takes her Norco x4hrs at home. Writer made patient aware that patient had medication PRN and it is encouraged for patient to call out and ask for it. Patient told writer how patient has a pain pump and sees pain management and is always in pain. Writer discussed non-pharmaological measures to help with pain in between Norco adminstration times. Patient was using KPAD however patient states she is feeling really hot at this time and does not want it. Patient afebrile. Writer gave patient a cool wash rag to apply to forehead per patient request.     Hermes Robledo got patient as comfortable as possible and updated patient that the soonest patient is able to get her pain medication is 2020 and encouraged patient to call out for it, however patient will be back after 2000 to give nightly medications and will also reassess patient pain and need for Norco again at that time. Patient acknowledged. Patient denies any other needs at this time. Call light, bedside table and personal belongings within reach.

## 2022-10-14 NOTE — PROGRESS NOTES
25.0-29. 9)    Estimated Daily Nutrient Needs:  Energy Requirements Based On: Kcal/kg  Weight Used for Energy Requirements: Current  Energy (kcal/day): 7569-8415 (20-23/kg)  Weight Used for Protein Requirements: Ideal  Protein (g/day): 74-91g (1.3-1.6g/kg)  Method Used for Fluid Requirements: ml/Kg  Fluid (ml/day): 1734 ml (23/kg)    Nutrition Diagnosis:   Predicted inadequate energy intake related to impaired respiratory function as evidenced by intake 51-75%    Nutrition Interventions:   Food and/or Nutrient Delivery: Continue Current Diet, Continue Oral Nutrition Supplement  Nutrition Education/Counseling: Education declined  Coordination of Nutrition Care: Continue to monitor while inpatient  Plan of Care discussed with: Patient and her     Goals:  Previous Goal Met: Progressing toward Goal(s)  Goals: PO intake 75% or greater     Recent Labs     10/12/22  0530 10/13/22  0615    142   K 4.0 3.3*   CL 99 101   CO2 35* 36*   BUN 8 9   CREATININE 0.51 0.52   GLUCOSE 86 94   ALT 10 11   ALKPHOS 81 80      Lab Results   Component Value Date/Time    LABALBU 3.7 10/13/2022 06:15 AM       Nutrition Monitoring and Evaluation:   Behavioral-Environmental Outcomes: Knowledge or Skill  Food/Nutrient Intake Outcomes: Diet Advancement/Tolerance  Physical Signs/Symptoms Outcomes: Biochemical Data, Weight, Fluid Status or Edema    Discharge Planning:    Continue current diet, Continue Oral Nutrition Supplement     Nolan Rodrigues RD, LD  Contact: [phone]

## 2022-10-14 NOTE — PROGRESS NOTES
Writer at bedside at this time to perform second shift assessment. Patient is lying in bed at this time. Vital signs taken and assessment completed at this time. Patient is alert and oriented x4 and has complaint of headache and back pain 9/10. Writer asked patient if she wanted anything for pain and patient requested a [employer] informed patient that she was not able to receive Norco at this time as it can only be given every 6 hours; writer asked patient if she wanted PRN Tylenol for pain and patient stated that she would wait until she could take Laverna Linger. Patient denies any further needs at this time. Call light within reach, bed alarm on for safety, will continue to monitor.

## 2022-10-14 NOTE — PROGRESS NOTES
Physical Therapy  Facility/Department: Critical access hospital AT THE [facility] MED SURG  Daily Treatment Note  NAME: Yan Serrano  : 1945  MRN: [id number]    Date of Service: 10/14/2022    Discharge Recommendations:  Continue to assess pending progress, Subacute/Skilled Nursing Facility        Patient Diagnosis(es): The primary encounter diagnosis was Pneumonia due to infectious organism, unspecified laterality, unspecified part of lung. Diagnoses of CO2 retention and Confusion were also pertinent to this visit. Assessment   Assessment: Pt with fair tolerance to treatment with min-mod rest breaks throughout. pt having dizziness when standing at 2WW with min/modA x2. Mild-mod shortness of breath, SpO2 94% on 2L of O2  Activity Tolerance: Patient limited by fatigue     Plan    Physcial Therapy Plan  General Plan: 2 times a day 7 days a week (1x per day on weekends.)     Restrictions  Restrictions/Precautions  Restrictions/Precautions: General Precautions  Required Braces or Orthoses?: No     Subjective    Subjective  Subjective: pt in chair when entering room. Pt reports back pain. agreeable to therapy  Pain: 8/10 back pain chronic  Orientation  Overall Orientation Status: Within Functional Limits     Objective   Vitals     Bed Mobility Training  Bed Mobility Training: No  Balance  Sitting: Intact  Standing: Impaired  Standing - Static: Constant support  Standing - Dynamic: Poor  Transfer Training  Transfer Training: Yes  Overall Level of Assistance: Moderate assistance;Assist X2  Interventions: Safety awareness training; Tactile cues; Verbal cues  Sit to Stand: Moderate assistance;Assist X2  Stand to Sit: Moderate assistance;Assist X2  Gait Training  Gait Training: No (Pt unable to safely ambulate at this time d/t poor balanace and motor control. Will progress as tolerated.)  Neuromuscular Education  Neuromuscular Education: Yes  Neuromuscular Comments: Static and dynamic standing at RW with min-modA of 1.  Pt stood ~2 minutes first trial and then ~1 minute 35 seconds second trial. Pt limited by fatigue. Continue to progress as tolerated. PT Exercises  Exercise Treatment: Standing B LE therex ~10 at RW calf raises and marches with--Nellie x 2 d/t involuntary movements causing balance issues. Seated B LE therex x 10. Moderate rest breaks throughout d/t fatigue and mild SOB--sp02 94% on 2L supplimental oxygen. Safety Devices  Type of Devices: All fall risk precautions in place;Call light within reach; Chair alarm in place; Left in chair;Gait belt (Patients  in room during and following treatment.)       Goals  Short Term Goals  Time Frame for Short Term Goals: 3 DAYS. Short Term Goal 1: CGA GAIT 50 FT FWW  Short Term Goal 2: SBA TRANSFERS AND SBA BED MOBILITY. Long Term Goals  Time Frame for Long Term Goals : 4 WEEKS  Long Term Goal 1: IND GAIT  FT,  Long Term Goal 2: IND TRANSFERS  Patient Goals   Patient Goals : RETURN HOME WITH ASSIST FROM . Education  Patient Education  Education Given To: Patient; Family  Education Provided: Transfer Training;Home Exercise Program  Education Provided Comments: Hand placement and safety for all transfers. Slow positional changes and energy conservation techniques.   Education Method: Verbal;Demonstration  Barriers to Learning: None  Education Outcome: Verbalized understanding;Continued education needed    Therapy Time   Individual Concurrent Group Co-treatment   Time In 1419         Time Out 1445         Minutes [address]

## 2022-10-14 NOTE — PROGRESS NOTES
RESPIRATORY ASSESSMENT PROTOCOL                                                                                              Patient Name: Anitra Cronin Room#: 9943/5470-54 : 1945     Admitting diagnosis: Confusion [R41.0]  CO2 retention [E87.29]  Sepsis due to pneumonia ([facility] [J18.9, A41.9]  Pneumonia due to infectious organism, unspecified laterality, unspecified part of lung [J18.9]       Medical History:   Past Medical History:   Diagnosis Date    A-fib ([place] [address]     Biventricular congestive heart failure (HCC)     Bronchiectasis with acute exacerbation ([facility] 2022    CAD (coronary artery disease)     Chronic pain syndrome     dilaudid infusion pump    COPD (chronic obstructive pulmonary disease) ([facility])     Depression     GERD (gastroesophageal reflux disease)     Hypothyroidism     Impaired fasting glucose     Iron deficiency anemia     Osteoporosis     Pulmonary fibrosis ([facility] 2022    Subdural hematoma ([place] [address] 2022       PATIENT ASSESSMENT    LABORATORY DATA  Hematology:   Lab Results   Component Value Date/Time    WBC 5.0 10/14/2022 09:48 AM    RBC 4.06 10/14/2022 09:48 AM    HGB 12.5 10/14/2022 09:48 AM    HCT 38.7 10/14/2022 09:48 AM     10/14/2022 09:48 AM     Chemistry:    Lab Results   Component Value Date/Time    PHART 7.400 10/14/2022 07:55 AM    UWY6NXF 51.4 10/14/2022 07:55 AM    PO2ART 99.0 10/14/2022 07:55 AM    N3UOOKUO 97.4 10/14/2022 07:55 AM    FNJ3PYY 31.1 10/14/2022 07:55 AM    PBEA 5.0 10/14/2022 07:55 AM       VITALS  Heart Rate: 87   Resp: 20  BP: (!) 116/52  SpO2: 94 % O2 Device: Nasal cannula  Temp: 97.3 °F (36.3 °C)    SKIN COLOR  [x] Normal  [] Pale  [] Dusky  [] Cyanotic    RESPIRATORY PATTERN  [x] Normal  [x] Dyspnea  [] Cheyne-Granger  [] Kussmaul  [] Biots    AMBULATORY  [] Yes  [x] No  [] With Assistance      Patient Acuity 0 1 2 3 4 Score   Level of Concious (LOC) [x]  Alert & Oriented or Pt normal LOC []  Confused;follows directions []  Confused & uncooper-ative []  Obtunded []  Comatose 0   Respiratory Rate  (RR) []  Reg. rate & pattern. 12 - 20 bpm  []  Increased RR. Greater than 20 bpm   [x]  SOB w/ exertion or RR greater than 24 bpm []  Access- ory muscle use at rest. Abn.  resp. []  SOB at rest.   2   Bilateral Breath Sounds (BBS) []  Clear []  Diminish-ed bases  []  Diminish-ed t/o, or rales   [x]  Sporadic, scattered wheezes or rhonchi []  Persistentwheezes and, or absent BBS 3   Cough []  Strong, effective, & non-prod. [x]  Effective & prod. Less than 25 ml (2 TBSP) over past 24 hrs []  Ineffective & non-prod to less than 25 ML over past 24 hrs []  Ineffective and, or greater than 25 ml sputum prod. past 24 hrs. []  Nonspon- taneous; Requires suctioning 1   Pulmonary History  (PULM HX) []  No smoking and no chronic pulmonaryhistory []  Former smoker. Quit over 12 mos. ago []  Current smoker or quit w/ in 12 mos []  Pulm. History and, or 20 pk/yr smoking hx [x]  Admitted w/ acute pulm. dx and, or has been admitted w/ pulm. dx 2 or more times over past 12 mos 4   Surgical History this Admit  (SURG HX) [x]  No surgery []  General surgery []  Lower abdominal []  Thoracic or upper abdominal   []  Thoracic w/ pulm. disease 0   Chest X-Ray (CXR)/CT Scan []  Clear or not applicable []  Not available []  Atelect- asis or pleural effusions []  Localized infiltrate or pulm. edema [x]  Con-solidated Infiltrates, bilateral, or in more than 1 lobe 4   Slow or Forced VC, FEV1 OR PEFR (PULM FXN)  [x]  80% or greater, or not indicated []  Pt. unable to perform []  FEV1 or PEFR or VC 51-79%.   []  FEV1 or PEFR or VC  30-49%   []  FEV1 or PEFR or VC less than 30%   0   TOTAL ACUITY: 14       CARE PLAN    If Acuity Level is 2, 3, or 4 in any of the following:    [x] BILATERAL BREATH SOUNDS (BBS)     [x] PULMONARY HISTORY (PULM HX)  [] PULMONARY FUNCTION (PULM FX)    Goal: Improve respiratory functions in patients with airway disease and decrease WOB    [x] AEROSOL PROTOCOL    Total Acuity:   16-32  []  Secondary Assessment in 24 hrs Total Acuity:  9-15  [x]  Secondary Assessment in 24 hrs Total Acuity:  4-8  []  Secondary Assessment in 48 hrs Total Acuity:  0-3  []  Secondary Assessment in 72 hrs   HHN AEROSOL THERAPY with  [physician-ordered bronchodilator(s)] q 4 & Albuterol PRN q2 hrs. Breath-Actuated Neb if BBS Acuity = 4, and pt. can use MP. Notify physician if condition deteriorates. HHN AEROSOL THERAPY with  [physician-ordered bronchodilator(s)]  QID and Albuterol PRN q4 hrs. Breath-Actuated Neb if BBS Acuity = 4, and pt. can use MP. Notify physician if condition deteriorates. MDI THERAPY with  2 actuations of [physician-ordered bronchodilator(s)] via spacer TID Albuterol and PRNq4 hrs. If unable to utilize MDI: HHN [physician-ordered bronchodilator(s)] TID and Albuterol PRN q4 hrs. Notify physician if condition deteriorates. MDI THERAPY with  [physician-ordered bronchodilator(s)] via spacer TID PRN. If unable to utilize MDI: HHN [physician-ordered bronchodilator(s)] TID PRN. Notify physician if condition deteriorates. If Acuity Level is 2, 3, or 4 in any of the following:    [] COUGH     [] SURGICAL HISTORY (SURG HX)  [x] CHEST XRAY (CXR)    Goal: Improvement in sputum mobilization in patients with ineffective airway clearance. Reverse atelectasis.     [x] Bronchopulmonary Hygiene Protocol    Total Acuity:   16-32  []  Secondary Assessment in 24 hrs Total Acuity:  9-15  [x]  Secondary Assessment in 24 hrs Total Acuity:  4-8  []  Secondary Assessment in 48 hrs Total Acuity:  0-3  []  Secondary Assessment in 72 hrs   METANEB QID with [physician-ordered bronchodilator(s)] if CXR Acuity = 4; otherwise:  PD&P, PEP, or Vest QID & PRN  NT Sxn PRN for ineffective cough  METANEB QID with [physician-ordered bronchodilator(s)] if CXR Acuity = 4; otherwise:  PD&P, PEP, or Vest TID & PRN  NT Sxn PRN for ineffective cough  Instruct patient to self-perform IS q1hr WA   Directed Cough self-performed q1hr WA     If Acuity Level is 2 or above in the following:    [] PULMONARY HISTORY (PULM HX)    Goal: Assist patient in quitting smoking to slow or stop the progression of lung disease.     [] Smoking Cessation Protocol    SMOKING CESSATION EDUCATION provided according to policy [id number]_590: (marlyn with an X)  ____Yes    ____ No     ____ NA    Smoking Cessation Booklet given:  ____Yes  ____No ____Patient Courtland Kussmaul

## 2022-10-14 NOTE — PROGRESS NOTES
[facility]    Facility/Department: Critical access hospital AT [facility] MED SURG    Speech Language Pathology    Dysphagia Treatment    Abbi Peters    : 1945 (78 y.o.)    MRN: [id number]    ROOM: [facility]    ADMISSION DATE: 10/8/2022    PATIENT DIAGNOSIS(ES): Confusion [R41.0]  CO2 retention [E87.29]  Sepsis due to pneumonia ([address] [J18.9, A41.9]  Pneumonia due to infectious organism, unspecified laterality, unspecified part of lung [J18.9]    Chief Complaint   Patient presents with    Shortness of Breath     Onset this morning         Patient Active Problem List    Diagnosis Date Noted    Sepsis due to pneumonia ([address] 10/08/2022    Midline shift of brain due to hematoma ([address] 2022    Subdural hematoma 2022    Fall as cause of accidental injury in home as place of occurrence, initial encounter 2022    Pulmonary fibrosis ([address] 2022    A-fib ([address] 2022    Anemia 10/14/2021    Biventricular congestive heart failure ([address]     COPD (chronic obstructive pulmonary disease) ([address] 10/22/2020    Hypothyroidism 10/05/2018    Major depressive disorder 10/05/2018    Chronic midline low back pain without sciatica 10/05/2018    Iron deficiency anemia 10/05/2018       Past Medical History:   Diagnosis Date    A-fib ([address]     Biventricular congestive heart failure (HCC)     Bronchiectasis with acute exacerbation ([address] 2022    CAD (coronary artery disease)     Chronic pain syndrome     dilaudid infusion pump    COPD (chronic obstructive pulmonary disease) (HCC)     Depression     GERD (gastroesophageal reflux disease)     Hypothyroidism     Impaired fasting glucose     Iron deficiency anemia     Osteoporosis     Pulmonary fibrosis ([address] 2022    Subdural hematoma ([address] 2022       Past Surgical History:   Procedure Laterality Date    BACK SURGERY  1998    spinal cord stimulator    BACK SURGERY  1999    infusion pump insertion    BACK SURGERY  10/23/2003    spinal cord stimulator removed    BACK SURGERY  10/23/2003    new infusion pump placed    BACK SURGERY  09/11/2017    replaced infusion pump    CARPAL TUNNEL RELEASE Right 12/17/1999    CARPAL TUNNEL RELEASE Left 11/24/1999    CARPAL TUNNEL RELEASE Right 12/30/2002    CARPAL TUNNEL RELEASE Left 12/17/2003    CERVICAL [address] SURGERY  10/25/2004    anterior cervical diskectomy C7-T1    CERVICAL DISC SURGERY  12/22/2004    anterior cervical discectomy E5-7 (complicated by damaged nerve to vocal cord)    CRANIOTOMY Left 8/23/2022    LEFT CRANIOTOMY FOR SUBDURAL HEMATOMA EVACUATION performed by Emery Zhao DO at [address] [place] [address] Left 12/20/2018    ORIF wrist    HUMERUS FRACTURE SURGERY Right 10/03/2010    HYSTERECTOMY (CERVIX STATUS UNKNOWN)  08/20/1991    KNEE ARTHROSCOPY Right 06/02/2011    KNEE SURGERY Right 02/04/2016    repair distal portion of knee arthroplasty due to prosthesis loosening    LUMBAR [address] SURGERY  02/15/1994    due to scar tissue from previous surgery    LUMBAR DISCECTOMY  08/30/1993    L5-S1    LUMBAR LAMINECTOMY  06/09/2008    L3-4-5    NECK SURGERY  05/03/2002    posterior plate fusion    NECK SURGERY  12/09/2005    reconnect nerve to vocal cord [facility]    TOE AMPUTATION  10/08/2006    left 3rd toe - osteomyelitis    TOE AMPUTATION  03/07/2008    left 4th toe partial amputation    TOE AMPUTATION  10/03/2008    left 2nd toe    TOTAL KNEE ARTHROPLASTY Right 03/19/2021    WRIST FRACTURE SURGERY Left 12/20/2018    WRIST OPEN REDUCTION INTERNAL FIXATION-DISTAL RADIUS performed by Waqas Flower MD at [facility] Left 12/20/2018    WRIST SURGERY Left 07/02/2019    left wrist ulnar shortening osteotomy       No Known Allergies    DATE ONSET: 10/07/2022    Date of Evaluation: 10/14/2022    Evaluating Therapist: Caitie Lara SLP    Dysphagia Diagnosis    Dysphagia Diagnosis: Mild oral stage dysphagia;Mild pharyngeal stage dysphagia    Recommended Diet    Recommendations: Dysphagia treatment    Diet Solids Recommendation: Regular    Liquid Consistency Recommendation: Thin    Recommended Form of Meds: PO    Compensatory Swallowing Strategies : Small bites/sips;Eat/Feed slowly;Upright as possible for all oral intake;Remain upright for 30-45 minutes after meals; Chin tuck    Reason for Referral    Tere Villalobos was referred for a bedside swallow evaluation to assess the efficiency of her swallow function, identify signs and symptoms of aspiration, identify risk factors, and make recommendations regarding safe dietary consistencies, effective compensatory strategies, and safe eating environment. General    Behavior/Cognition: Alert; Cooperative;Pleasant mood  Respiratory Status: O2 via nasual cannula  O2 Device: Nasal cannula  Communication Observation: Functional  Follows Directions: Simple  Dentition: Dentures top;Dentures bottom  Patient Positioning: Upright in chair  Baseline Vocal Quality: Hoarse  Volitional Cough: Strong  Consistencies Administered: Regular; Thin - straw    Vision and Hearing    Vision  Vision: Impaired  Vision Exceptions: Wears glasses for reading  Hearing  Hearing: Within functional limits    Current Diet level    Current Diet : Regular    Oral Motor    Labial: No impairment  Dentition: Upper dentures; Lower dentures  Oral Hygiene: Moist  Lingual: No impairment  Velum: No Impairment  Mandible: No impairment    Oral/Pharyngeal Phase    Oral Phase - Comment: Patient demonstrated prolonged mastication of regular solids and displays a much chew pattern, however mastication functional for swallowing. No oral residues noted post-swallow. Pharyngeal Phase: Pt demonstrates reduced laryngeal elevation upon palpation. Pt trialed regular solids with no overt s/sx of asp/pen in 6/6 trials and thin liquids with no overt s/sx of asp/pen in 8/8 trials. Pt independently utilized chin tuck on 7/8 trials and used effortful swallow with x1 verbal cue. oropharyngeal exercises 10-15x each  Long-term Goals  Timeframe for Long-term Goals: 14 days  Goal 1: Pt will tolerate least restrictive diet without s/s of aspiration during inpatient stay. Safety Devices    Safety Devices  Safety Devices in place: Yes  Type of devices: Left in chair; All fall risk precautions in place;Call light within reach; Chair alarm in place    Pain Assessment    Pain Assessment: Patient does not c/o pain      Therapy Time    SLP Individual Minutes  Time In: 7612  Time Out: 7360  Minutes: 17       Pt seen in room for dysphagia tx this date. Pt seated upright in chair with family member present. Pt seen with snack of arelis crackers and water via straw. Patient demonstrated prolonged mastication of regular solids and displays a much chew pattern, however mastication functional for swallowing. No oral residues noted post-swallow. Pt demonstrates reduced laryngeal elevation upon palpation. Pt trialed regular solids with no overt s/sx of asp/pen in 6/6 trials and thin liquids with no overt s/sx of asp/pen in 8/8 trials. Pt independently utilized chin tuck on 7/8 trials and used effortful swallow with x1 verbal cue. Pt noted to be coughing upon entering the room prior to PO trials. Pt completed x5 effortful swallows, x5 tongue retractions, and x2 diego maneuvers. ST recommends continued diet of regular solids and thin liquids. ST will continue to follow pt through remainder of stay inpatient. Compensatory swallowing strategies should include: chin tuck, small bites/sips, pacing/slow rate, alternate bites/sips, upright during and 30-45 minutes after the swallow.        Electronically signed: Myrna Serna M.A., CF-SLP            10/14/2022

## 2022-10-14 NOTE — PROGRESS NOTES
Nick Decker M.D. Internal Medicine Progress Note    Patient: Anitra Cronin  Date of Admission: 10/8/2022 10:06 AM  Hospital Day # 6  Date of Evaluation: 10/14/2022      SUBJECTIVE:    Patient seen for f/u of Sepsis due to pneumonia [facility]). She still isn't feeling much better today. She is coughing a lot and feels like mucus is stuck. She gets SOB with minimal exertion. She denies fevers or chills and has been afebrile. She denies any chest pain or palpitations. ROS:   Constitutional: negative  for fevers, and negative for chills. Respiratory: positive for shortness of breath, positive for cough, and positive for wheezing  Cardiovascular: negative for chest pain, and negative for palpitations  Gastrointestinal: negative for abdominal pain, negative for nausea,negative for vomiting, negative for diarrhea, and negative for constipation     All other systems were reviewed with the patient and are negative unless otherwise stated in HPI    -----------------------------------------------------------------  OBJECTIVE:  Vitals:   Temp: 96.8 °F (36 °C)  BP: (!) 159/87  Resp: 18  Heart Rate: 87  SpO2: 96 % on supplemental O2    Weight  Wt Readings from Last 3 Encounters:   10/14/22 158 lb 15.2 oz (72.1 kg)   09/14/22 143 lb (64.9 kg)   09/07/22 143 lb (64.9 kg)     Body mass index is 26.45 kg/m². 24HR INTAKE/OUTPUT:      Intake/Output Summary (Last 24 hours) at 10/14/2022 0701  Last data filed at 10/14/2022 0506  Gross per 24 hour   Intake 2508.77 ml   Output --   Net 2508.77 ml       Exam:  GEN:    Awake, alert and oriented x3. EYES:  EOMI, pupils equal   NECK: Supple. No lymphadenopathy. No carotid bruit  CVS:    regular rate and rhythm, no audible murmur  PULM:  diminished with bilateral expiratory wheezing and scattered rhonchi,  no acute respiratory distress  ABD:    Bowels sounds normal.  Abdomen is soft. No distention. no tenderness to palpation. EXT:   no edema bilaterally .   No calf tenderness. NEURO: Moves all extremities. Motor and sensory are grossly intact  SKIN:  No rashes. No skin lesions.    -----------------------------------------------------------------  DATA:  Complete Blood Count:   Recent Labs     10/12/22  0530 10/13/22  0615   WBC 3.6 3.0*   RBC 3.94* 3.99   HGB 12.0 11.9   HCT 38.2 37.3   MCV 97.0 93.5   MCH 30.5 29.8   MCHC 31.4 31.9   RDW 13.2 13.3    207   MPV 10.0 9.8        Last 3 Blood Glucose:   Recent Labs     10/12/22  0530 10/13/22  0615   GLUCOSE 86 94        Comprehensive Metabolic Profile:   Recent Labs     10/12/22  0530 10/13/22  0615    142   K 4.0 3.3*   CL 99 101   CO2 35* 36*   BUN 8 9   CREATININE 0.51 0.52   GLUCOSE 86 94   CALCIUM 9.7 9.9   PROT 6.6 6.5   LABALBU 3.6 3.7   BILITOT 0.3 0.3   ALKPHOS 81 80   AST 13 14   ALT 10 11        Urinalysis:   Lab Results   Component Value Date/Time    NITRU NEGATIVE 10/08/2022 11:24 AM    COLORU Yellow 10/08/2022 11:24 AM    PHUR 6.0 10/08/2022 11:24 AM    SPECGRAV 1.015 10/08/2022 11:24 AM    LEUKOCYTESUR NEGATIVE 10/08/2022 11:24 AM    UROBILINOGEN Normal 10/08/2022 11:24 AM    BILIRUBINUR NEGATIVE 10/08/2022 11:24 AM    GLUCOSEU NEGATIVE 10/08/2022 11:24 AM    KETUA NEGATIVE 10/08/2022 11:24 AM      Latest Reference Range & Units 10/9/22 12:00 10/10/22 05:30   pH, Arterial 7.35 - 7.45  7.379 7.332 (L)   pCO2, Arterial 35 - 45 mmHg 49.4 (H) 58.9 (H)   pO2, Arterial 80.0 - 100.0 mmHg 78.2 (L) 92.5   HCO3, Arterial 22 - 26 mmol/L 28.5 (H) 30.5 (H)   Positive Base Excess, Art 0.0 - 2.0 mmol/L 2.4 (H) 2.9 (H)   O2 Sat, Arterial 95 - 98 % 95.2 96.4   (L): Data is abnormally low  (H): Data is abnormally high  HgBA1c:    Lab Results   Component Value Date/Time    LABA1C 5.7 06/20/2022 10:00 AM     Radiology/Imaging:  FL MODIFIED BARIUM SWALLOW W VIDEO   Final Result   Swallowing mechanism grossly within normal limits without evidence of   aspiration. 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   Consider barium esophagram follow-up if   indicated. Please see separate speech pathology report for full discussion of findings   and recommendations. RECOMMENDATIONS:   Unavailable         CT Head W/O Contrast   Final Result   No acute process. No significant change in primarily low-density left   convexity extra-axial collection. XR CHEST PORTABLE   Final Result   Suspected bilateral infectious/inflammatory airways process.                  ASSESSMENT / PLAN:  Sepsis due to pneumonia   Sepsis criteria resolved however pneumonia persists  Continue  Zosyn / Levofloxacin    Mucinex-DM bid  Administered Mucomyst x 2 past 2 days  Repeat CXR  MBS = no aspiration  Acute on chronic respiratory failure with hypoxia and hypercapnia  Continue supplemental O2   Recheck ABG  COPD with pulmonary fibrosis  Add solumedrol  Continue Anoro Ellipta  Continue Duonebs  Chronic pain syndrome  Continue Norco  Constipation   Continue Glycolax, MOM  Nutrition status:   at risk for malnutrition  Dietician consult initiated  Hospital Prophylaxis:   DVT: Lovenox   Stress Ulcer: H2 Blocker   High risk medications: none   Disposition:    Discharge plan is Jennifer Bowen MD , M.D.  10/14/2022  7:01 AM

## 2022-10-14 NOTE — PROGRESS NOTES
Writer at bedside at this time to perform initial shift assessment. Patient is sitting up in the chair at this time. Vital signs taken and assessment completed at this time. Patient is alert and oriented x4 and has complaint of headache/back pain 8/10;patient denies wanting any pain medication at this time. Patient denies any further needs at this time. Call light within reach, chair alarm on for safety, will continue to monitor.

## 2022-10-14 NOTE — PLAN OF CARE
Problem: Discharge Planning  Goal: Discharge to home or other facility with appropriate resources  Outcome: Progressing  Flowsheets (Taken 10/14/2022 0032)  Discharge to home or other facility with appropriate resources:   Identify barriers to discharge with patient and caregiver   Identify discharge learning needs (meds, wound care, etc)   Arrange for needed discharge resources and transportation as appropriate  Note:  working with patient. Problem: Safety - Adult  Goal: Free from fall injury  Outcome: Progressing  Flowsheets (Taken 10/14/2022 0032)  Free From Fall Injury: Instruct family/caregiver on patient safety     Problem: ABCDS Injury Assessment  Goal: Absence of physical injury  Outcome: Progressing  Flowsheets (Taken 10/14/2022 0032)  Absence of Physical Injury: Implement safety measures based on patient assessment     Problem: Skin/Tissue Integrity  Goal: Absence of new skin breakdown  Description: 1. Monitor for areas of redness and/or skin breakdown  2. Assess vascular access sites hourly  3. Every 4-6 hours minimum:  Change oxygen saturation probe site  4. Every 4-6 hours:  If on nasal continuous positive airway pressure, respiratory therapy assess nares and determine need for appliance change or resting period. Outcome: Progressing  Note: Eusebio scale monitoring per protocol. Inspect skin for breakdown frequently. Encourage pt to make frequent large adjustments in position or assist patient with turning. Document all areas of breakdown.         Problem: Pain  Goal: Verbalizes/displays adequate comfort level or baseline comfort level  Outcome: Progressing  Flowsheets (Taken 10/14/2022 0032)  Verbalizes/displays adequate comfort level or baseline comfort level:   Encourage patient to monitor pain and request assistance   Administer analgesics based on type and severity of pain and evaluate response   Assess pain using appropriate pain scale   Implement non-pharmacological measures as appropriate and evaluate response  Note: Pain assessed every four hours and as needed using 0-10 pain scale. Pt educated on scale and uses scale appropriately. Encourage pt to notify staff of pain before pain becomes uncontrollable. Correlate periods of heavy activity after pain medication administration.  Use pharmacological and non pharmacological methods for pain relief such as: warm blankets, ice, television, reading, or rest.        Problem: Nutrition Deficit:  Goal: Optimize nutritional status  Outcome: Progressing

## 2022-10-14 NOTE — PROGRESS NOTES
Physical Therapy  Facility/Department: [facility] AT THE [facility] MED SURG  Daily Treatment Note  NAME: Yan Serrano  : 1945  MRN: [id number]    Date of Service: 10/14/2022    Discharge Recommendations:  Continue to assess pending progress, Subacute/Skilled Nursing Facility        Patient Diagnosis(es): The primary encounter diagnosis was Pneumonia due to infectious organism, unspecified laterality, unspecified part of lung. Diagnoses of CO2 retention and Confusion were also pertinent to this visit. Assessment   Assessment: Pt with fair tolerance to treatment with min-mod rest breaks throughout. pt having dizziness when standing at 2WW with min/modA x2. Mild-mod shortness of breath, SpO2 96% on 2L of O2  Activity Tolerance: Patient tolerated treatment well;Patient limited by fatigue     Plan    Physcial Therapy Plan  General Plan: 2 times a day 7 days a week (1x per day on weekends)     Restrictions  Restrictions/Precautions  Restrictions/Precautions: General Precautions  Required Braces or Orthoses?: No     Subjective    Subjective  Subjective: pt in chair when entering room. Pt reports back pain. agreeable to therapy  Pain: 8/10 back pain chronic     Objective   Vitals  SpO2: 96 %  O2 Device: Nasal cannula  Bed Mobility Training  Bed Mobility Training: No  Balance  Sitting: Intact  Standing: Impaired  Standing - Static: Constant support  Standing - Dynamic: Poor  Transfer Training  Transfer Training: Yes  Overall Level of Assistance: Moderate assistance;Assist X2     PT Exercises  Exercise Treatment: Seated and reclined BLE ther ex x15. Pt with 2-3 short RBs with each. Static Standing Balance Exercises: Pt completed static standing at 2WW for trials of 1 min, 20 sec. Pt required Nellie/modA x2 to maintain balance d/t involuntary movement and increased shakiness. Safety Devices  Type of Devices: All fall risk precautions in place;Call light within reach; Chair alarm in place;Nurse notified;Gait belt;Left in chair (assisted RN transfer pt to wheelchair. pt had call light within reach when leaving room)       Goals  Short Term Goals  Time Frame for Short Term Goals: 3 DAYS. Short Term Goal 1: CGA GAIT 50 FT FWW  Short Term Goal 2: SBA TRANSFERS AND SBA BED MOBILITY. Long Term Goals  Time Frame for Long Term Goals : 4 WEEKS  Long Term Goal 1: IND GAIT  FT,  Long Term Goal 2: IND TRANSFERS  Patient Goals   Patient Goals : RETURN HOME WITH ASSIST FROM . Education  Patient Education  Education Given To: Patient  Education Provided Comments: Hand placement for all transfers.   Education Method: Verbal;Demonstration  Barriers to Learning: None  Education Outcome: Verbalized understanding;Continued education needed    Therapy Time   Individual Concurrent Group Co-treatment   Time In 0708         Time Out 0735         Minutes [place], [place]

## 2022-10-14 NOTE — PROGRESS NOTES
Patient; Family  Education Provided: Role of Therapy;Plan of Care;Home Exercise Program  Education Method: Demonstration; Teach Back  Barriers to Learning: None  Education Outcome: Verbalized understanding;Continued education needed    Goals  Short Term Goals  Time Frame for Short Term Goals: 20 visits  Short Term Goal 1: Patient to tolerate 15 minutes ther-ex/ther-act to increase ease of ADL participation. Short Term Goal 2: Patient to complete standing sinkside ADLs with SBA. Short Term Goal 3: Patient to complete LB bathing/dressing with SBA. Short Term Goal 4: Pt will be educated on EC strategies and breathing techniques to decrease fatigue and improve participation in ADL's.        Therapy Time   Individual Concurrent Group Co-treatment   Time In 1100         Time Out 1124         Minutes 24                 PARISH Teixeira

## 2022-10-15 LAB
ABSOLUTE EOS #: 0 K/UL (ref 0–0.44)
ABSOLUTE IMMATURE GRANULOCYTE: 0 K/UL (ref 0–0.3)
ABSOLUTE LYMPH #: 0.54 K/UL (ref 1.1–3.7)
ABSOLUTE MONO #: 0.06 K/UL (ref 0.1–1.2)
ALBUMIN SERPL-MCNC: 4.2 G/DL (ref 3.5–5.2)
ALBUMIN/GLOBULIN RATIO: 1.4 (ref 1–2.5)
ALP BLD-CCNC: 88 U/L (ref 35–104)
ALT SERPL-CCNC: 20 U/L (ref 5–33)
ANION GAP SERPL CALCULATED.3IONS-SCNC: 9 MMOL/L (ref 9–17)
AST SERPL-CCNC: 17 U/L
BASOPHILS # BLD: 0 % (ref 0–2)
BASOPHILS ABSOLUTE: 0 K/UL (ref 0–0.2)
BILIRUB SERPL-MCNC: <0.1 MG/DL (ref 0.3–1.2)
BUN BLDV-MCNC: 14 MG/DL (ref 8–23)
BUN/CREAT BLD: 27 (ref 9–20)
CALCIUM SERPL-MCNC: 9.8 MG/DL (ref 8.6–10.4)
CHLORIDE BLD-SCNC: 98 MMOL/L (ref 98–107)
CO2: 32 MMOL/L (ref 20–31)
CREAT SERPL-MCNC: 0.51 MG/DL (ref 0.5–0.9)
EOSINOPHILS RELATIVE PERCENT: 0 % (ref 1–4)
GFR SERPL CREATININE-BSD FRML MDRD: >60 ML/MIN/1.73M2
GLUCOSE BLD-MCNC: 168 MG/DL (ref 70–99)
HCT VFR BLD CALC: 37.3 % (ref 36.3–47.1)
HEMOGLOBIN: 12.2 G/DL (ref 11.9–15.1)
IMMATURE GRANULOCYTES: 0 %
LYMPHOCYTES # BLD: 9 % (ref 24–43)
MCH RBC QN AUTO: 30.3 PG (ref 25.2–33.5)
MCHC RBC AUTO-ENTMCNC: 32.7 G/DL (ref 28.4–34.8)
MCV RBC AUTO: 92.6 FL (ref 82.6–102.9)
MONOCYTES # BLD: 1 % (ref 3–12)
MORPHOLOGY: NORMAL
NRBC AUTOMATED: 0 PER 100 WBC
PDW BLD-RTO: 13.2 % (ref 11.8–14.4)
PLATELET # BLD: 220 K/UL (ref 138–453)
PMV BLD AUTO: 9.6 FL (ref 8.1–13.5)
POTASSIUM SERPL-SCNC: 3.9 MMOL/L (ref 3.7–5.3)
RBC # BLD: 4.03 M/UL (ref 3.95–5.11)
SEG NEUTROPHILS: 90 % (ref 36–65)
SEGMENTED NEUTROPHILS ABSOLUTE COUNT: 5.4 K/UL (ref 1.5–8.1)
SODIUM BLD-SCNC: 139 MMOL/L (ref 135–144)
TOTAL PROTEIN: 7.2 G/DL (ref 6.4–8.3)
WBC # BLD: 6 K/UL (ref 3.5–11.3)

## 2022-10-15 PROCEDURE — 6360000002 HC RX W HCPCS: Performed by: INTERNAL MEDICINE

## 2022-10-15 PROCEDURE — 6370000000 HC RX 637 (ALT 250 FOR IP): Performed by: INTERNAL MEDICINE

## 2022-10-15 PROCEDURE — 92526 ORAL FUNCTION THERAPY: CPT

## 2022-10-15 PROCEDURE — 94640 AIRWAY INHALATION TREATMENT: CPT

## 2022-10-15 PROCEDURE — 36415 COLL VENOUS BLD VENIPUNCTURE: CPT

## 2022-10-15 PROCEDURE — 97535 SELF CARE MNGMENT TRAINING: CPT

## 2022-10-15 PROCEDURE — 97110 THERAPEUTIC EXERCISES: CPT

## 2022-10-15 PROCEDURE — 2700000000 HC OXYGEN THERAPY PER DAY

## 2022-10-15 PROCEDURE — 80053 COMPREHEN METABOLIC PANEL: CPT

## 2022-10-15 PROCEDURE — 94761 N-INVAS EAR/PLS OXIMETRY MLT: CPT

## 2022-10-15 PROCEDURE — 1200000000 HC SEMI PRIVATE

## 2022-10-15 PROCEDURE — 94664 DEMO&/EVAL PT USE INHALER: CPT

## 2022-10-15 PROCEDURE — 97530 THERAPEUTIC ACTIVITIES: CPT

## 2022-10-15 PROCEDURE — 85025 COMPLETE CBC W/AUTO DIFF WBC: CPT

## 2022-10-15 PROCEDURE — 2580000003 HC RX 258: Performed by: INTERNAL MEDICINE

## 2022-10-15 RX ADMIN — PREGABALIN 300 MG: 75 CAPSULE ORAL at 09:03

## 2022-10-15 RX ADMIN — METHYLPREDNISOLONE SODIUM SUCCINATE 60 MG: 125 INJECTION, POWDER, FOR SOLUTION INTRAMUSCULAR; INTRAVENOUS at 07:14

## 2022-10-15 RX ADMIN — IPRATROPIUM BROMIDE AND ALBUTEROL SULFATE 3 ML: 2.5; .5 SOLUTION RESPIRATORY (INHALATION) at 05:28

## 2022-10-15 RX ADMIN — LEVOFLOXACIN 750 MG: 5 INJECTION, SOLUTION INTRAVENOUS at 12:28

## 2022-10-15 RX ADMIN — LEVOTHYROXINE SODIUM 200 MCG: 100 TABLET ORAL at 07:14

## 2022-10-15 RX ADMIN — PANTOPRAZOLE SODIUM 40 MG: 40 TABLET, DELAYED RELEASE ORAL at 07:14

## 2022-10-15 RX ADMIN — PIPERACILLIN AND TAZOBACTAM 4500 MG: 4; .5 INJECTION, POWDER, LYOPHILIZED, FOR SOLUTION INTRAVENOUS at 12:27

## 2022-10-15 RX ADMIN — SERTRALINE HYDROCHLORIDE 50 MG: 50 TABLET ORAL at 09:03

## 2022-10-15 RX ADMIN — POTASSIUM BICARBONATE 20 MEQ: 782 TABLET, EFFERVESCENT ORAL at 20:40

## 2022-10-15 RX ADMIN — ENOXAPARIN SODIUM 40 MG: 100 INJECTION SUBCUTANEOUS at 09:04

## 2022-10-15 RX ADMIN — AMLODIPINE BESYLATE 10 MG: 10 TABLET ORAL at 09:03

## 2022-10-15 RX ADMIN — TRAZODONE HYDROCHLORIDE 200 MG: 50 TABLET ORAL at 20:39

## 2022-10-15 RX ADMIN — SODIUM CHLORIDE, PRESERVATIVE FREE 10 ML: 5 INJECTION INTRAVENOUS at 09:04

## 2022-10-15 RX ADMIN — FAMOTIDINE 20 MG: 20 TABLET ORAL at 09:03

## 2022-10-15 RX ADMIN — PIPERACILLIN AND TAZOBACTAM 4500 MG: 4; .5 INJECTION, POWDER, LYOPHILIZED, FOR SOLUTION INTRAVENOUS at 05:02

## 2022-10-15 RX ADMIN — IPRATROPIUM BROMIDE AND ALBUTEROL SULFATE 3 ML: 2.5; .5 SOLUTION RESPIRATORY (INHALATION) at 20:30

## 2022-10-15 RX ADMIN — SODIUM CHLORIDE, PRESERVATIVE FREE 10 ML: 5 INJECTION INTRAVENOUS at 20:40

## 2022-10-15 RX ADMIN — POTASSIUM BICARBONATE 20 MEQ: 782 TABLET, EFFERVESCENT ORAL at 09:04

## 2022-10-15 RX ADMIN — IPRATROPIUM BROMIDE AND ALBUTEROL SULFATE 3 ML: 2.5; .5 SOLUTION RESPIRATORY (INHALATION) at 11:49

## 2022-10-15 RX ADMIN — HYDROCODONE BITARTRATE AND ACETAMINOPHEN 1 TABLET: 10; 325 TABLET ORAL at 09:03

## 2022-10-15 RX ADMIN — PREGABALIN 300 MG: 75 CAPSULE ORAL at 20:39

## 2022-10-15 RX ADMIN — HYDROCODONE BITARTRATE AND ACETAMINOPHEN 1 TABLET: 10; 325 TABLET ORAL at 05:00

## 2022-10-15 RX ADMIN — Medication 1000 MG: at 20:40

## 2022-10-15 RX ADMIN — HYDROCODONE BITARTRATE AND ACETAMINOPHEN 1 TABLET: 10; 325 TABLET ORAL at 00:51

## 2022-10-15 RX ADMIN — HYDROCODONE BITARTRATE AND ACETAMINOPHEN 1 TABLET: 10; 325 TABLET ORAL at 18:26

## 2022-10-15 RX ADMIN — FAMOTIDINE 20 MG: 20 TABLET ORAL at 20:39

## 2022-10-15 RX ADMIN — DULOXETINE HYDROCHLORIDE 60 MG: 60 CAPSULE, DELAYED RELEASE ORAL at 09:03

## 2022-10-15 RX ADMIN — METHYLPREDNISOLONE SODIUM SUCCINATE 60 MG: 125 INJECTION, POWDER, FOR SOLUTION INTRAMUSCULAR; INTRAVENOUS at 00:51

## 2022-10-15 RX ADMIN — Medication 1000 MG: at 09:04

## 2022-10-15 RX ADMIN — METHYLPREDNISOLONE SODIUM SUCCINATE 60 MG: 125 INJECTION, POWDER, FOR SOLUTION INTRAMUSCULAR; INTRAVENOUS at 16:01

## 2022-10-15 RX ADMIN — IPRATROPIUM BROMIDE AND ALBUTEROL SULFATE 3 ML: 2.5; .5 SOLUTION RESPIRATORY (INHALATION) at 16:29

## 2022-10-15 RX ADMIN — HYDROXYCHLOROQUINE SULFATE 300 MG: 200 TABLET ORAL at 09:03

## 2022-10-15 RX ADMIN — HYDROCODONE BITARTRATE AND ACETAMINOPHEN 1 TABLET: 10; 325 TABLET ORAL at 23:30

## 2022-10-15 RX ADMIN — HYDROCODONE BITARTRATE AND ACETAMINOPHEN 1 TABLET: 10; 325 TABLET ORAL at 14:03

## 2022-10-15 RX ADMIN — METHYLPREDNISOLONE SODIUM SUCCINATE 60 MG: 125 INJECTION, POWDER, FOR SOLUTION INTRAMUSCULAR; INTRAVENOUS at 23:26

## 2022-10-15 ASSESSMENT — PAIN - FUNCTIONAL ASSESSMENT
PAIN_FUNCTIONAL_ASSESSMENT: ACTIVITIES ARE NOT PREVENTED

## 2022-10-15 ASSESSMENT — PAIN DESCRIPTION - FREQUENCY
FREQUENCY: CONTINUOUS

## 2022-10-15 ASSESSMENT — PAIN SCALES - GENERAL
PAINLEVEL_OUTOF10: 6
PAINLEVEL_OUTOF10: 8
PAINLEVEL_OUTOF10: 5

## 2022-10-15 ASSESSMENT — PAIN DESCRIPTION - LOCATION
LOCATION: BACK

## 2022-10-15 ASSESSMENT — PAIN DESCRIPTION - ORIENTATION
ORIENTATION: LOWER

## 2022-10-15 ASSESSMENT — PAIN DESCRIPTION - ONSET
ONSET: ON-GOING

## 2022-10-15 ASSESSMENT — PAIN DESCRIPTION - PAIN TYPE
TYPE: CHRONIC PAIN

## 2022-10-15 ASSESSMENT — PAIN DESCRIPTION - DESCRIPTORS
DESCRIPTORS: ACHING
DESCRIPTORS: OTHER (COMMENT)
DESCRIPTORS: ACHING

## 2022-10-15 NOTE — PROGRESS NOTES
Pt called out requesting to get up to bathroom. Libertad Mendoza used to get patient from chair to bathroom. Pt sat for several minutes, missed hat. Urine occurrence and small stool occurrence recorded in output. Gown changed and brief changed due to being dirty. Pt then assisted to bed from bathroom. Pt positioned per comfort. Pillow support provided. Bedside table and call light placed into reach. Telemetry patches replaced. Pt denies any other needs at this time.

## 2022-10-15 NOTE — PROGRESS NOTES
Pt sitting up in chair with family visiting. Initial shift assessment done and VS obtained as charted in flow sheet. Pt is A&Ox4. Pt complains of chronic back pain, prn Norco administered. SPO2 96% on 2 LPM nasal cannula. Upper lung sounds clear, bases diminished with noted expiratory wheezing. Pt and family deny any other needs at this time. Call light and bedside table are within reach, will continue to monitor.

## 2022-10-15 NOTE — PROGRESS NOTES
Occupational Therapy  Facility/Department: [facility] AT THE [facility] MED SURG  Daily Treatment Note  NAME: Stephanie Wright  : 1945  MRN: [id number]    Date of Service: 10/15/2022    Discharge Recommendations:  Continue to assess pending progress, Subacute/Skilled Nursing Facility, Home with Home health OT         Patient Diagnosis(es): The primary encounter diagnosis was Pneumonia due to infectious organism, unspecified laterality, unspecified part of lung. Diagnoses of CO2 retention and Confusion were also pertinent to this visit. Assessment    Activity Tolerance: Patient tolerated treatment well  Discharge Recommendations: Continue to assess pending progress;Subacute/Skilled Nursing Facility;Home with Home health OT      Plan   Occupational Therapy Plan  Times Per Week: 7  Times Per Day: Once a day  Current Treatment Recommendations: Strengthening; Functional mobility training;Self-Care / ADL; Safety education & training; Endurance training     Restrictions  Restrictions/Precautions  Restrictions/Precautions: General Precautions    Subjective   Subjective  Subjective: Pt sitting up in bedside chair upon arrival. Pt agreed to participate in therapy session. Pain: Pt reported 8/10 low back pain. Objective    Vitals     Bed Mobility Training  Bed Mobility Training: No  Transfer Training  Transfer Training: Yes  Overall Level of Assistance: Moderate assistance;Assist X2  Sit to Stand: Moderate assistance;Assist X2  Stand to Sit: Moderate assistance;Assist X2  Stand Pivot Transfers: Moderate assistance;Assist X2  Toilet Transfer: Moderate assistance;Assist X2 (BS)     ADL  Toileting: Moderate assistance  OT Exercises  Exercise Treatment: Pt tolerated BUE ther ex with 1# dumbbell x 7 planes x 15 reps x 1 set to increase UE strength and endurance in order to ease completion of ADL tasks. Pt required RBs as needed secondary to fatigue. Safety Devices  Type of Devices: Call light within reach; Chair alarm in place; Left in chair     Patient Education  Education Given To: Patient  Education Provided: Role of Therapy;Plan of Care;Transfer Training  Education Method: Demonstration  Barriers to Learning: None  Education Outcome: Verbalized understanding;Continued education needed    Goals  Short Term Goals  Time Frame for Short Term Goals: 20 visits  Short Term Goal 1: Patient to tolerate 15 minutes ther-ex/ther-act to increase ease of ADL participation. Short Term Goal 2: Patient to complete standing sinkside ADLs with SBA. Short Term Goal 3: Patient to complete LB bathing/dressing with SBA. Short Term Goal 4: Pt will be educated on EC strategies and breathing techniques to decrease fatigue and improve participation in ADL's.        Therapy Time   Individual Concurrent Group Co-treatment   Time In 0825         Time Out 0857         Minutes 32                 EB Mcdonough/BOB

## 2022-10-15 NOTE — RT PROTOCOL NOTE
RESPIRATORY ASSESSMENT PROTOCOL                                                                                              Patient Name: Cookie Hartman Room#: 3007/[id number] : 1945     Admitting diagnosis: Confusion [R41.0]  CO2 retention [E87.29]  Sepsis due to pneumonia ([facility] [J18.9, A41.9]  Pneumonia due to infectious organism, unspecified laterality, unspecified part of lung [J18.9]       Medical History:   Past Medical History:   Diagnosis Date    A-fib ([place] [address]     Biventricular congestive heart failure (HCC)     Bronchiectasis with acute exacerbation ([facility] 2022    CAD (coronary artery disease)     Chronic pain syndrome     dilaudid infusion pump    COPD (chronic obstructive pulmonary disease) (HCC)     Depression     GERD (gastroesophageal reflux disease)     Hypothyroidism     Impaired fasting glucose     Iron deficiency anemia     Osteoporosis     Pulmonary fibrosis ([facility] 2022    Subdural hematoma ([place] [address] 2022       PATIENT ASSESSMENT    LABORATORY DATA  Hematology:   Lab Results   Component Value Date/Time    WBC 6.0 10/15/2022 07:28 AM    RBC 4.03 10/15/2022 07:28 AM    HGB 12.2 10/15/2022 07:28 AM    HCT 37.3 10/15/2022 07:28 AM     10/15/2022 07:28 AM     Chemistry:    Lab Results   Component Value Date/Time    PHART 7.400 10/14/2022 07:55 AM    BIU1UGM 51.4 10/14/2022 07:55 AM    PO2ART 99.0 10/14/2022 07:55 AM    K3QCRPDG 97.4 10/14/2022 07:55 AM    HDG2DAY 31.1 10/14/2022 07:55 AM    PBEA 5.0 10/14/2022 07:55 AM       VITALS  Heart Rate: 75   Resp: 22  BP: 120/64  SpO2: 95 % O2 Device: Nasal cannula  Temp: 97.2 °F (36.2 °C)    SKIN COLOR  [x] Normal  [] Pale  [] Dusky  [] Cyanotic    RESPIRATORY PATTERN  [x] Normal  [] Dyspnea  [] Cheyne-Granger  [] Kussmaul  [] Biots    AMBULATORY  [] Yes  [] No  [x] With Assistance    PEAK FLOW  Predicted:     Personal Best:        VITAL CAPACITY  Predicted value:  ml  Actual Value:  ml  30% of Predicted:  ml  Patient Acuity 0 1 2 3 4 Score   Level of Concious (LOC) [x]  Alert & Oriented or Pt normal LOC []  Confused;follows directions []  Confused & uncooper-ative []  Obtunded []  Comatose 0   Respiratory Rate  (RR) []  Reg. rate & pattern. 12 - 20 bpm  []  Increased RR. Greater than 20 bpm   [x]  SOB w/ exertion or RR greater than 24 bpm []  Access- ory muscle use at rest. Abn.  resp. []  SOB at rest.   2   Bilateral Breath Sounds (BBS) []  Clear []  Diminish-ed bases  []  Diminish-ed t/o, or rales   [x]  Sporadic, scattered wheezes or rhonchi []  Persistentwheezes and, or absent BBS 3   Cough [x]  Strong, effective, & non-prod. []  Effective & prod. Less than 25 ml (2 TBSP) over past 24 hrs []  Ineffective & non-prod to less than 25 ML over past 24 hrs []  Ineffective and, or greater than 25 ml sputum prod. past 24 hrs. []  Nonspon- taneous; Requires suctioning 0   Pulmonary History  (PULM HX) []  No smoking and no chronic pulmonaryhistory []  Former smoker. Quit over 12 mos. ago []  Current smoker or quit w/ in 12 mos []  Pulm. History and, or 20 pk/yr smoking hx [x]  Admitted w/ acute pulm. dx and, or has been admitted w/ pulm. dx 2 or more times over past 12 mos 4   Surgical History this Admit  (SURG HX) [x]  No surgery []  General surgery []  Lower abdominal []  Thoracic or upper abdominal   []  Thoracic w/ pulm. disease 0   Chest X-Ray (CXR)/CT Scan []  Clear or not applicable []  Not available []  Atelect- asis or pleural effusions [x]  Localized infiltrate or pulm. edema []  Con-solidated Infiltrates, bilateral, or in more than 1 lobe 3   Slow or Forced VC, FEV1 OR PEFR (PULM FXN)  [x]  80% or greater, or not indicated []  Pt. unable to perform []  FEV1 or PEFR or VC 51-79%.   []  FEV1 or PEFR or VC  30-49%   []  FEV1 or PEFR or VC less than 30%   0   TOTAL ACUITY: 12       CARE PLAN    If Acuity Level is 2, 3, or 4 in any of the following:    [x] BILATERAL BREATH SOUNDS (BBS)     [x] PULMONARY HISTORY (PULM HX)  [] PULMONARY FUNCTION (PULM FX)    Goal: Improve respiratory functions in patients with airway disease and decrease WOB    [x] AEROSOL PROTOCOL    Total Acuity:   16-32  []  Secondary Assessment in 24 hrs Total Acuity:  9-15  [x]  Secondary Assessment in 24 hrs Total Acuity:  4-8  []  Secondary Assessment in 48 hrs Total Acuity:  0-3  []  Secondary Assessment in 72 hrs   HHN AEROSOL THERAPY with  [physician-ordered bronchodilator(s)] q 4 & Albuterol PRN q2 hrs. Breath-Actuated Neb if BBS Acuity = 4, and pt. can use MP. Notify physician if condition deteriorates. HHN AEROSOL THERAPY with  [physician-ordered bronchodilator(s)]  QID and Albuterol PRN q4 hrs. Breath-Actuated Neb if BBS Acuity = 4, and pt. can use MP. Notify physician if condition deteriorates. MDI THERAPY with  2 actuations of [physician-ordered bronchodilator(s)] via spacer TID Albuterol and PRNq4 hrs. If unable to utilize MDI: HHN [physician-ordered bronchodilator(s)] TID and Albuterol PRN q4 hrs. Notify physician if condition deteriorates. MDI THERAPY with  [physician-ordered bronchodilator(s)] via spacer TID PRN. If unable to utilize MDI: HHN [physician-ordered bronchodilator(s)] TID PRN. Notify physician if condition deteriorates. If Acuity Level is 2, 3, or 4 in any of the following:    [] COUGH     [] SURGICAL HISTORY (SURG HX)  [x] CHEST XRAY (CXR)    Goal: Improvement in sputum mobilization in patients with ineffective airway clearance. Reverse atelectasis.     [x] Bronchopulmonary Hygiene Protocol    Total Acuity:   16-32  []  Secondary Assessment in 24 hrs Total Acuity:  9-15  [x]  Secondary Assessment in 24 hrs Total Acuity:  4-8  []  Secondary Assessment in 48 hrs Total Acuity:  0-3  []  Secondary Assessment in 72 hrs   METANEB QID with [physician-ordered bronchodilator(s)] if CXR Acuity = 4; otherwise:  PD&P, PEP, or Vest QID & PRN  NT Sxn PRN for ineffective cough  METANEB QID with [physician-ordered bronchodilator(s)] if CXR Acuity = 4; otherwise:  PD&P, PEP, or Vest TID & PRN  NT Sxn PRN for ineffective cough  Instruct patient to self-perform IS q1hr WA   Directed Cough self-performed q1hr WA     If Acuity Level is 2 or above in the following:    [] PULMONARY HISTORY (PULM HX)    Goal: Assist patient in quitting smoking to slow or stop the progression of lung disease.     [] Smoking Cessation Protocol    SMOKING CESSATION EDUCATION provided according to policy FO_952: (marlyn with an X)  ____Yes    ____ No     ____ NA    Smoking Cessation Booklet given:  ____Yes  ____No ____Patient Rubén Corral

## 2022-10-15 NOTE — PROGRESS NOTES
Tai WONG updated writer stating she gave Solumedrol IV and PRN Norco PO for pain. Per Tai WONG patient didn't express any other needs than wanting CPAP removed. Writer stopped into patient room shortly after and patient was back asleep.

## 2022-10-15 NOTE — PROGRESS NOTES
[facility] UNIT   APRN - Progress Note    Patient - Diana Louis  Date of Admission -  10/8/2022 10:06 AM  Date of Evaluation -  10/15/2022  Hospital Day - 7      SUBJECTIVE:     The Diana Louis is a 69 y.o. female Per nursing report and notes, overnight events include: no significant events. She sitting up in chair alert and no distress. Complains of chronic pain. States she is not getting up phlegmn. ROS:   Constitutional: negative  for fevers, and negative for chills. Respiratory: positive for shortness of breath, positive for cough, and negative for wheezing  Cardiovascular: negative for chest pain, and negative for palpitations  Gastrointestinal: negative for abdominal pain, negative for nausea,negative for vomiting, negative for diarrhea, and positive for constipation    All other systems were reviewed with the patient and are negative unless otherwise stated in HPI.       OBJECTIVE:     VITAL SIGNS:  Patient Vitals for the past 8 hrs:   BP Temp Temp src Pulse Resp SpO2 Weight   10/15/22 0702 132/82 97.9 °F (36.6 °C) Temporal 88 24 95 % --   10/15/22 0528 -- -- -- -- -- 97 % --   10/15/22 0500 -- -- -- 74 -- -- --   10/15/22 0400 -- -- -- 79 -- -- --   10/15/22 0300 -- -- -- 90 -- -- --   10/15/22 0255 -- -- -- -- -- -- 147 lb 4.3 oz (66.8 kg)   10/15/22 0240 (!) 159/96 97.3 °F (36.3 °C) Temporal 91 22 97 % --   10/15/22 0200 -- -- -- 78 -- -- --   10/15/22 0100 -- -- -- 90 -- -- --   10/15/22 0000 -- -- -- 85 -- -- --         Temp: 97.9 °F (36.6 °C)  Temp range:    Temp  Av.5 °F (36.4 °C)  Min: 97.3 °F (36.3 °C)  Max: 97.9 °F (36.6 °C)    BP: 132/82  BP Range:      Systolic (74CRX), EBX:940 , Min:116 , WB      Diastolic (85YKT), KTV:46, Min:52, Max:96    Heart Rate: 88  Pulse Range:    Pulse  Av.5  Min: 74  Max: 106    Resp: 24  Resp Range:   Resp  Av.5  Min: 18  Max: 24    SpO2: 95 % on supplemental O2  SpO2 range:   SpO2  Av.1 %  Min: 91 %  Max: 97 %    Weight  Wt Readings from Last 3 Encounters:   10/15/22 147 lb 4.3 oz (66.8 kg)   09/14/22 143 lb (64.9 kg)   09/07/22 143 lb (64.9 kg)     Body mass index is 24.51 kg/m². 24HR INTAKE/OUTPUT:      Intake/Output Summary (Last 24 hours) at 10/15/2022 0746  Last data filed at 10/15/2022 6268  Gross per 24 hour   Intake 1912 ml   Output 3275 ml   Net -1363 ml     Date 10/15/22 0000 - 10/15/22 2359   Shift 6350-8835 2796-6998 0300-4735 24 Hour Total   INTAKE   P.O. 300   300   I. V.(mL/kg/hr) 1252   1252   Shift Total(mL/kg) 0423(25.9)   4403(10.1)   OUTPUT   Urine(mL/kg/hr) 1850   1850   Shift Total(mL/kg) 1850(27.7)   1850(27.7)   Weight (kg) 66.8 66.8 66.8 66.8         PHYSICAL EXAM:  GEN:    Awake and following commands:     [] No   [x] Yes  MENTAL STATUS: alert and oriented x3. DISTRESS: Acute respiratory distress:       [x] No   [] Yes  EYES:  EOMI, pupils equal   NECK: Supple. No lymphadenopathy. No carotid bruit  CVS:    regular but tachycardic, no audible murmur  PULM: rhonchi and wheeze throughout no acute respiratory distress  ABD:    Bowels sounds normal.  Abdomen is soft. No distention. no tenderness to palpation. EXT:   trace edema bilaterally . No calf tenderness. NEURO: Moves all extremities. Motor and sensory are grossly intact  SKIN:  No rashes. No skin lesions.           MEDICATIONS:  Scheduled Meds:   methylPREDNISolone  60 mg IntraVENous Q8H    bisacodyl  10 mg Oral Once    magnesium hydroxide  30 mL Oral Once    amLODIPine  10 mg Oral Daily    DULoxetine  60 mg Oral Daily    hydroxychloroquine  300 mg Oral Daily    levETIRAcetam  1,000 mg Oral BID    levothyroxine  200 mcg Oral Daily    pantoprazole  40 mg Oral QAM AC    potassium bicarb-citric acid  20 mEq Oral BID    pregabalin  300 mg Oral BID    sertraline  50 mg Oral Daily    traZODone  200 mg Oral Nightly    tiotropium-olodaterol  2 puff Inhalation Daily    sodium chloride flush  5-40 mL IntraVENous 2 times per day    famotidine  20 mg Oral BID    enoxaparin  40 mg SubCUTAneous Daily    piperacillin-tazobactam  4,500 mg IntraVENous Q8H    levofloxacin  750 mg IntraVENous Q24H    ipratropium-albuterol  3 mL Inhalation 4x daily     Continuous Infusions:   sodium chloride Stopped (10/11/22 0643)     PRN Meds:   HYDROcodone-acetaminophen, 1 tablet, Q4H PRN  polyethylene glycol, 17 g, Daily PRN  guaiFENesin-dextromethorphan, 5 mL, Q4H PRN  Benzocaine-Menthol, 1 lozenge, Q2H PRN  ondansetron, 4 mg, Q8H PRN  sodium chloride flush, 5-40 mL, PRN  sodium chloride, 25 mL, PRN  acetaminophen, 650 mg, Q6H PRN   Or  acetaminophen, 650 mg, Q6H PRN  albuterol, 2.5 mg, Q4H PRN      DATA:  Complete Blood Count:   Recent Labs     10/13/22  0615 10/14/22  0948 10/15/22  0728   WBC 3.0* 5.0 6.0   RBC 3.99 4.06 4.03   HGB 11.9 12.5 12.2   HCT 37.3 38.7 37.3   MCV 93.5 95.3 92.6   RDW 13.3 13.4 13.2    229 220   SEGS 50 83* PENDING   NEUTROABS 1.49* 4.13 PENDING   LYMPHOPCT 31 10* PENDING   LYMPHSABS 0.92* 0.52* PENDING   MONOPCT 11 4 PENDING   EOSRELPCT 7* 2 PENDING   BASOPCT 1 1 PENDING   IMMGRAN 0 0 PENDING        Recent Blood Glucose:   Recent Labs     10/13/22  0615 10/14/22  0948   GLUCOSE 94 90        Comprehensive Metabolic Profile:   Recent Labs     10/13/22  0615 10/14/22  0948   BUN 9 11   CREATININE 0.52 0.59    143   K 3.3* 3.9    102   MG 1.9  --    CALCIUM 9.9 10.4   ANIONGAP 5* 8*   CO2 36* 33*   PROT 6.5 7.1   LABALBU 3.7 4.0   BILITOT 0.3 0.2*   ALKPHOS 80 87   AST 14 18   ALT 11 15        Urinalysis:   Lab Results   Component Value Date/Time    NITRU NEGATIVE 10/08/2022 11:24 AM    COLORU Yellow 10/08/2022 11:24 AM    PHUR 6.0 10/08/2022 11:24 AM    WBCUA 0 TO 2 09/09/2022 09:29 PM    RBCUA 0 TO 2 09/09/2022 09:29 PM    MUCUS NOT REPORTED 10/14/2021 08:07 PM    TRICHOMONAS NOT REPORTED 10/14/2021 08:07 PM    YEAST NOT REPORTED 10/14/2021 08:07 PM    BACTERIA 1+ 09/09/2022 09:29 PM    SPECGRAV 1.015 10/08/2022 11:24 AM    LEUKOCYTESUR NEGATIVE 10/08/2022 11:24 AM    UROBILINOGEN Normal 10/08/2022 11:24 AM    BILIRUBINUR NEGATIVE 10/08/2022 11:24 AM    GLUCOSEU NEGATIVE 10/08/2022 11:24 AM    KETUA NEGATIVE 10/08/2022 11:24 AM    AMORPHOUS 2+ 09/09/2022 09:29 PM       HgBA1c:    Lab Results   Component Value Date/Time    LABA1C 5.7 06/20/2022 10:00 AM       TSH:    Lab Results   Component Value Date/Time    TSH 11.11 06/20/2022 10:01 AM       Lactic Acid:   Lab Results   Component Value Date/Time    LACTA 1.9 04/28/2022 03:14 PM    LACTA 2.0 02/06/2022 10:51 AM    LACTA 2.5 02/05/2022 03:30 PM        High Sensitivity Troponin:  No results for input(s): TROPHS in the last 72 hours. Pro-BNP:  Lab Results   Component Value Date    PROBNP 117 10/08/2022     D-Dimer:  Lab Results   Component Value Date    DDIMER 0.69 (H) 01/18/2022     PT/INR:    Lab Results   Component Value Date/Time    PROTIME 13.0 10/08/2022 11:01 AM    INR 1.0 10/08/2022 11:01 AM     PTT:    Lab Results   Component Value Date/Time    APTT 28.7 10/08/2022 11:01 AM       CRP: No results for input(s): CRP in the last 72 hours. ABGs:   Lab Results   Component Value Date/Time    PHART 7.400 10/14/2022 07:55 AM    SRJ9AQW 51.4 10/14/2022 07:55 AM    PO2ART 99.0 10/14/2022 07:55 AM    RPR5MDP 31.1 10/14/2022 07:55 AM    REX3HEV 35 11/02/2020 04:16 AM    Q5RNBTJN 97.4 10/14/2022 07:55 AM    FIO2 28 10/10/2022 05:30 AM         Radiology/Imaging:  XR CHEST (2 VW)   Final Result   Nonspecific reticulonodular airspace opacities could reflect pulmonary edema,   pneumonitis/pneumonia including viral/atypical etiology or combination there   of.         FL MODIFIED BARIUM SWALLOW W VIDEO   Final Result   Swallowing mechanism grossly within normal limits without evidence of   aspiration. 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 Consider barium esophagram follow-up if   indicated. Please see separate speech pathology report for full discussion of findings   and recommendations. RECOMMENDATIONS:   Unavailable         CT Head W/O Contrast   Final Result   No acute process. No significant change in primarily low-density left   convexity extra-axial collection. XR CHEST PORTABLE   Final Result   Suspected bilateral infectious/inflammatory airways process.                ASSESSMENT / PLAN:     Sepsis due to pneumonia ([address]  Continue current therapy  Continue IV Zosyn  Continue IV Levaquin  Stop IV fluids  Trend labs-improving  Remove Sheikh  Acute on chronic respiratory failure with hypoxia and hypercapnia  Trend ABGs-improving  Wean oxygen  CPAP intemittant  Mucomyst neb x 2 doses given  COPD/pulmonary fibrosis  Continue Anoro Ellipta, duo nebs  Hypertension  Continue amlodipine  Chronic pain syndrome  Continue Norco  Nutrition status:   at risk for malnutrition  Dietician consult initiated  [] NPO [] TPN      [] Tube feed [] Clear liquid        [] Full liquid [x] regular diet         [] Fluid restriction   [] Diabetic diet   Prophylaxis:   DVT: Lovenox   Stress Ulcer: H2 Blocker   High risk medications: none   Disposition:    Discharge plan is [place]  She is approved by Insurance at this time        RYAN Connor CNP , RYAN-NP-C  10/15/2022  7:46 AM

## 2022-10-15 NOTE — PROGRESS NOTES
Patient asleep up in the chair. Easily awoken. VS and assessment completed. Patient c/o her chronic back pain. Heating pad provided to lower back. Medications given.  Coffee and snack provided per request. Denies any other needs at this time

## 2022-10-15 NOTE — PROGRESS NOTES
Antibiotic hung. PRN [place] PO given for 8/10. Patient assisted onto [facility] at this time. Patient returned to chair and got comfortable. Patient denies any other needs at this time. Call light, bedside table and personal belongings within reach.

## 2022-10-15 NOTE — PROGRESS NOTES
Patient assisted up to the bathroom via stand up lift at this time. X2 assist needed d/t oxygen tubing, running fluids/antibiotics and getting stand up lift into bathroom. Patient urinated and had small BM. Patient assisted back into bed via stand up lift. Patient denies any other needs at this time. Call light, bedside table and personal belongings within reach.

## 2022-10-15 NOTE — PROGRESS NOTES
Christa RT at bedside reassess saturation and CPAP. Patient awoke and stated had to go to bathroom right away. Writer was working on getting patient up and out of bed and patient stated she couldn't wait and asked for [facility] be brought to the bedside. Writer brought [facility] to bedside and patient stood and pivoted onto [facility]. Patient urinated 1200ml. Patient assisted back into bed. Weight, vital signs and reassessment completed. Assessment unchanged from previous shift assessment. Vital signs completed. Christa RT put patient on nasal cannula for getting up to [facility]. Patient requested to keep nasal cannula on and not put back CPAP on. Patient than requested if able to get up and sit in the chair stating \"I'm up for the day. \" Writer and another RN assisted patient to ambulate a few steps to chair. Patient unsteady and very shaky while ambulating. Patient got comfortable in chair with pillow support and blankets. Water pitcher refilled. Patient denies any other needs at this time. Call light, bedside table and personal belongings within reach.

## 2022-10-15 NOTE — PROGRESS NOTES
Writer was alerted by staff roughly around 2020 that patient was in pain and was wanting her [place] [address] finished up in another patient's room and went straight to medication room. While in medication room, writer was informed again that patient is calling out and again and is upset. Writer quickly finished in medication room and went straight to patient room at roughly 2030. Patient is on cell phone with  and upset    Writer apologized and stated Bahman Cantu had patient's pain medication here. Patient stated \"My back is hurting so bad and I have to go to the bathroom\" visibly angry. Writer profusely apologized again. Patient stated she wanted to get up and go to the bathroom now and will take her Norco after going to the bathroom. Writer assisted patient onto stand up lift at this time, writer continuing to apologize. Patient begins to become tearful and angry with writer. Once on the toilet patient stated she needed a bit, writer instructed patient to pull cord when done. Once patient was done, patient pulled cord and writer came in and assist patient back into bed with stand up lift. Writer gave patient all nightly medications whole in applesauce including PRN Norco and PRN Robitussin. Patient apologized to writer stating \"I'm sorry, I should of went off on you. I just really had to go to the bathroom and was in pain. \" Writer expressed understanding of patient's frustration and also apologized and thanked patient. Patient expresses appreciated. Patient denies any other needs at this time. Call light, bedside table and personal belongings within reach.

## 2022-10-15 NOTE — PROGRESS NOTES
[facility]    Facility/Department: Formerly [facility], [facility] AT THE [facility] MED SURG    Speech Language Pathology    Clinical Bedside Swallow Evaluation    NAME:Toshia Kilpatrick    : 1945 (78 y.o.)    MRN: [id number]    ROOM: [id number]/[id number]    ADMISSION DATE: 10/8/2022    PATIENT DIAGNOSIS(ES): Confusion [R41.0]  CO2 retention [E87.29]  Sepsis due to pneumonia ([address] [J18.9, A41.9]  Pneumonia due to infectious organism, unspecified laterality, unspecified part of lung [J18.9]    Chief Complaint   Patient presents with    Shortness of Breath     Onset this morning         Patient Active Problem List    Diagnosis Date Noted    Sepsis due to pneumonia ([address] 10/08/2022    Midline shift of brain due to hematoma ([address] 2022    Subdural hematoma 2022    Fall as cause of accidental injury in home as place of occurrence, initial encounter 2022    Pulmonary fibrosis ([address] 2022    A-fib ([address] 2022    Anemia 10/14/2021    Biventricular congestive heart failure ([address]     COPD (chronic obstructive pulmonary disease) ([address] 10/22/2020    Hypothyroidism 10/05/2018    Major depressive disorder 10/05/2018    Chronic midline low back pain without sciatica 10/05/2018    Iron deficiency anemia 10/05/2018       Past Medical History:   Diagnosis Date    A-fib ([address]     Biventricular congestive heart failure (HCC)     Bronchiectasis with acute exacerbation ([address] 2022    CAD (coronary artery disease)     Chronic pain syndrome     dilaudid infusion pump    COPD (chronic obstructive pulmonary disease) (HCC)     Depression     GERD (gastroesophageal reflux disease)     Hypothyroidism     Impaired fasting glucose     Iron deficiency anemia     Osteoporosis     Pulmonary fibrosis ([address] 2022    Subdural hematoma ([address] 2022       Past Surgical History:   Procedure Laterality Date    BACK SURGERY  1998    spinal cord stimulator    BACK SURGERY  1999    infusion pump insertion    BACK SURGERY  10/23/2003 spinal cord stimulator removed    BACK SURGERY  10/23/2003    new infusion pump placed    BACK SURGERY  09/11/2017    replaced infusion pump    CARPAL TUNNEL RELEASE Right 12/17/1999    CARPAL TUNNEL RELEASE Left 11/24/1999    CARPAL TUNNEL RELEASE Right 12/30/2002    CARPAL TUNNEL RELEASE Left 12/17/2003    CERVICAL [address] SURGERY  10/25/2004    anterior cervical diskectomy C7-T1    CERVICAL DISC SURGERY  12/22/2004    anterior cervical discectomy U7-9 (complicated by damaged nerve to vocal cord)    CRANIOTOMY Left 8/23/2022    LEFT CRANIOTOMY FOR SUBDURAL HEMATOMA EVACUATION performed by Vickie Allen DO at [address] [place] [address] Left 12/20/2018    ORIF wrist    HUMERUS FRACTURE SURGERY Right 10/03/2010    HYSTERECTOMY (CERVIX STATUS UNKNOWN)  08/20/1991    KNEE ARTHROSCOPY Right 06/02/2011    KNEE SURGERY Right 02/04/2016    repair distal portion of knee arthroplasty due to prosthesis loosening    LUMBAR [address] SURGERY  02/15/1994    due to scar tissue from previous surgery    LUMBAR DISCECTOMY  08/30/1993    L5-S1    LUMBAR LAMINECTOMY  06/09/2008    L3-4-5    NECK SURGERY  05/03/2002    posterior plate fusion    NECK SURGERY  12/09/2005    reconnect nerve to vocal cord [facility]    TOE AMPUTATION  10/08/2006    left 3rd toe - osteomyelitis    TOE AMPUTATION  03/07/2008    left 4th toe partial amputation    TOE AMPUTATION  10/03/2008    left 2nd toe    TOTAL KNEE ARTHROPLASTY Right 03/19/2021    WRIST FRACTURE SURGERY Left 12/20/2018    WRIST OPEN REDUCTION INTERNAL FIXATION-DISTAL RADIUS performed by Miri Campbell MD at [facility] Left 12/20/2018    WRIST SURGERY Left 07/02/2019    left wrist ulnar shortening osteotomy       No Known Allergies    DATE ONSET: 10/07/2022    Date of Evaluation: 10/15/2022    Evaluating Therapist: HILLARY Brice    Dysphagia Diagnosis    Dysphagia Diagnosis: Mild oral stage dysphagia;Mild pharyngeal stage dysphagia    Recommended Diet    Diet Solids Recommendation: Regular    Liquid Consistency Recommendation: Thin    Recommended Form of Meds: PO    Compensatory Swallowing Strategies : Small bites/sips;Eat/Feed slowly;Upright as possible for all oral intake;Remain upright for 30-45 minutes after meals; Chin tuck    Reason for Referral    Anitra Cronin was referred for a bedside swallow evaluation to assess the efficiency of her swallow function, identify signs and symptoms of aspiration, identify risk factors, and make recommendations regarding safe dietary consistencies, effective compensatory strategies, and safe eating environment. General    Chart Reviewed: Yes  Behavior/Cognition: Alert; Cooperative;Pleasant mood  Respiratory Status: O2 via nasual cannula  O2 Device: Nasal cannula  Communication Observation: Functional  Follows Directions: Simple  Dentition: Dentures top;Dentures bottom  Patient Positioning: Upright in chair  Baseline Vocal Quality: Hoarse  Volitional Cough: Strong  Consistencies Administered: Regular; Thin - straw; Thin - cup    Vision and Hearing    Vision  Vision: Impaired  Vision Exceptions: Wears glasses for reading  Hearing  Hearing: Within functional limits    Current Diet level    Current Diet : Regular    Oral Motor    Labial: No impairment  Dentition: Upper dentures; Lower dentures  Oral Hygiene: Moist  Lingual: No impairment  Velum: No Impairment  Mandible: No impairment    Oral/Pharyngeal Phase    Oral Phase - Comment: Patient demonstrated prolonged mastication of regular solids and displays a much chew pattern, however mastication functional for swallowing. No oral residues noted post-swallow. Pharyngeal Phase: Pt demonstrates reduced laryngeal elevation upon palpation. Pt observed during breakfast meal consisting of Sinhala toast, sausage, applesauce, and thin liquids. Pt consumed all solids with no overt s/sx of asp/pen in 13/13 trials and thin liquids with no overt s/sx of asp/pen in 15/15 trials. Pt independently used strategies of chin tuck and alternating bites and sips on 95% of trials of solids and liquids. Pt noted to be coughing upon entering room prior to PO tirals. PO Trials  Vocal Quality: No Impairment  Consistency Presented: Regular; Thin  How Presented: Self-fed/presented  Bolus Acceptance: No impairment  Bolus Formation/Control: Impaired  Type of Impairment: Mastication;Delayed  Propulsion: Delayed (# of seconds)  Oral Residue: None  Initiation of Swallow: Delayed (# of seconds)  Laryngeal Elevation: Decreased  Aspiration Signs/Symptoms: None  Pharyngeal Phase Characteristics: No impairment, issues, or problems  Effective Modifications: Chin tuck      Dysphagia Diagnosis    Dysphagia Diagnosis: Mild oral stage dysphagia;Mild pharyngeal stage dysphagia    Dysphagia Outcome Severity Scale: Level 5: Mild dysphagia- Distant supervision. May need one diet consistency restricted    Recommendations    Requires SLP Intervention: No  D/C Recommendations: No follow up therapy recommended post discharge  Diet Solids Recommendation: Regular  Liquid Consistency Recommendation: Thin  Compensatory Swallowing Strategies : Small bites/sips;Eat/Feed slowly;Upright as possible for all oral intake;Remain upright for 30-45 minutes after meals; Chin tuck  Recommended Form of Meds: PO  Therapeutic Interventions: Diet tolerance monitoring;Pharyngeal exercises    Prognosis    Prognosis: Fair    Education    Individuals consulted  Consulted and agree with results and recommendations: Patient  RN Name: Armando Bates    Patient Education: Educated pt on discharge from speech therapy services for remainder of stay in hospital. Pt provided educataion on continuing to implement strategies of chin tuck and continuing to perform swallowing exercises.     Treatment/Goals    Short-term Goals  Timeframe for Short-term Goals: 7 days  Goal 1: Patient will utilize compensatory swallowing strategies during a snack or meal with 80% accuracy given min A  Goal 2: Patient will consume regular solid and thin liquids without overt s/sx of asp/pen with 80% accuracy  Goal 3: Patient will complete oropharyngeal exercises 10-15x each  Long-term Goals  Timeframe for Long-term Goals: 14 days  Goal 1: Pt will tolerate least restrictive diet without s/s of aspiration during inpatient stay. Safety Devices    Safety Devices  Safety Devices in place: Yes  Type of devices: Left in chair; All fall risk precautions in place;Call light within reach; Chair alarm in place    Pain Assessment    Pain Assessment: Patient does not c/o pain      Therapy Time    SLP Individual Minutes  Time In: 3641  Time Out: 7013  Minutes: 29      Pt seen in room seated upright in chair for breakfast meal this date. Pt able to independently state swallowing strategies upon ST arrival.    Patient demonstrated prolonged mastication of regular solids and displays a much chew pattern, however mastication functional for swallowing. No oral residues noted post-swallow. Pt demonstrates reduced laryngeal elevation upon palpation. Pt observed during breakfast meal consisting of Estonian toast, sausage, applesauce, and thin liquids. Pt consumed all solids with no overt s/sx of asp/pen in 13/13 trials and thin liquids with no overt s/sx of asp/pen in 15/15 trials. Pt independently used strategies of chin tuck and alternating bites and sips on 95% of trials of solids and liquids. Pt noted to be coughing upon entering room prior to PO tirals. ST recommends discharge from speech therapy services at this time. Pt's oral and pharyngeal phases are functional for current diet recommendations. ST educated pt on continued use of chin tuck when consuming solids and liquids and continuing to utilize the following swallowing strategies: Small bites/sips;Eat/Feed slowly;Upright as possible for all oral intake;Remain upright for 30-45 minutes after meals; Chin tuck.  If s/sx of asp/pen arise during remainder of stay, contact ST to reassess.        Electronically signed: Alan Low M.A., CF-SLP            10/15/2022

## 2022-10-16 LAB
ABSOLUTE EOS #: 0 K/UL (ref 0–0.44)
ABSOLUTE IMMATURE GRANULOCYTE: 0 K/UL (ref 0–0.3)
ABSOLUTE LYMPH #: 0.36 K/UL (ref 1.1–3.7)
ABSOLUTE MONO #: 0.15 K/UL (ref 0.1–1.2)
ALBUMIN SERPL-MCNC: 4.1 G/DL (ref 3.5–5.2)
ALBUMIN/GLOBULIN RATIO: 1.4 (ref 1–2.5)
ALP BLD-CCNC: 80 U/L (ref 35–104)
ALT SERPL-CCNC: 27 U/L (ref 5–33)
ANION GAP SERPL CALCULATED.3IONS-SCNC: 7 MMOL/L (ref 9–17)
AST SERPL-CCNC: 23 U/L
BASOPHILS # BLD: 0 % (ref 0–2)
BASOPHILS ABSOLUTE: 0 K/UL (ref 0–0.2)
BILIRUB SERPL-MCNC: <0.1 MG/DL (ref 0.3–1.2)
BUN BLDV-MCNC: 17 MG/DL (ref 8–23)
BUN/CREAT BLD: 35 (ref 9–20)
CALCIUM SERPL-MCNC: 10.1 MG/DL (ref 8.6–10.4)
CHLORIDE BLD-SCNC: 103 MMOL/L (ref 98–107)
CO2: 34 MMOL/L (ref 20–31)
CREAT SERPL-MCNC: 0.48 MG/DL (ref 0.5–0.9)
EOSINOPHILS RELATIVE PERCENT: 0 % (ref 1–4)
GFR SERPL CREATININE-BSD FRML MDRD: >60 ML/MIN/1.73M2
GLUCOSE BLD-MCNC: 160 MG/DL (ref 70–99)
HCT VFR BLD CALC: 37.1 % (ref 36.3–47.1)
HEMOGLOBIN: 11.8 G/DL (ref 11.9–15.1)
IMMATURE GRANULOCYTES: 0 %
LYMPHOCYTES # BLD: 7 % (ref 24–43)
MCH RBC QN AUTO: 30 PG (ref 25.2–33.5)
MCHC RBC AUTO-ENTMCNC: 31.8 G/DL (ref 28.4–34.8)
MCV RBC AUTO: 94.4 FL (ref 82.6–102.9)
MONOCYTES # BLD: 3 % (ref 3–12)
MORPHOLOGY: ABNORMAL
NRBC AUTOMATED: 0 PER 100 WBC
PDW BLD-RTO: 13.2 % (ref 11.8–14.4)
PLATELET # BLD: 234 K/UL (ref 138–453)
PMV BLD AUTO: 10 FL (ref 8.1–13.5)
POTASSIUM SERPL-SCNC: 4.1 MMOL/L (ref 3.7–5.3)
RBC # BLD: 3.93 M/UL (ref 3.95–5.11)
SEG NEUTROPHILS: 90 % (ref 36–65)
SEGMENTED NEUTROPHILS ABSOLUTE COUNT: 4.59 K/UL (ref 1.5–8.1)
SODIUM BLD-SCNC: 144 MMOL/L (ref 135–144)
TOTAL PROTEIN: 7.1 G/DL (ref 6.4–8.3)
WBC # BLD: 5.1 K/UL (ref 3.5–11.3)

## 2022-10-16 PROCEDURE — 94664 DEMO&/EVAL PT USE INHALER: CPT

## 2022-10-16 PROCEDURE — 85025 COMPLETE CBC W/AUTO DIFF WBC: CPT

## 2022-10-16 PROCEDURE — 6370000000 HC RX 637 (ALT 250 FOR IP): Performed by: INTERNAL MEDICINE

## 2022-10-16 PROCEDURE — 94669 MECHANICAL CHEST WALL OSCILL: CPT

## 2022-10-16 PROCEDURE — 6370000000 HC RX 637 (ALT 250 FOR IP): Performed by: NURSE PRACTITIONER

## 2022-10-16 PROCEDURE — 36415 COLL VENOUS BLD VENIPUNCTURE: CPT

## 2022-10-16 PROCEDURE — 6360000002 HC RX W HCPCS: Performed by: INTERNAL MEDICINE

## 2022-10-16 PROCEDURE — 97110 THERAPEUTIC EXERCISES: CPT

## 2022-10-16 PROCEDURE — 2580000003 HC RX 258: Performed by: INTERNAL MEDICINE

## 2022-10-16 PROCEDURE — 94761 N-INVAS EAR/PLS OXIMETRY MLT: CPT

## 2022-10-16 PROCEDURE — 2700000000 HC OXYGEN THERAPY PER DAY

## 2022-10-16 PROCEDURE — 94640 AIRWAY INHALATION TREATMENT: CPT

## 2022-10-16 PROCEDURE — 1200000000 HC SEMI PRIVATE

## 2022-10-16 PROCEDURE — 80053 COMPREHEN METABOLIC PANEL: CPT

## 2022-10-16 RX ADMIN — METHYLPREDNISOLONE SODIUM SUCCINATE 60 MG: 125 INJECTION, POWDER, FOR SOLUTION INTRAMUSCULAR; INTRAVENOUS at 06:58

## 2022-10-16 RX ADMIN — PREGABALIN 300 MG: 75 CAPSULE ORAL at 21:03

## 2022-10-16 RX ADMIN — HYDROCODONE BITARTRATE AND ACETAMINOPHEN 1 TABLET: 10; 325 TABLET ORAL at 06:57

## 2022-10-16 RX ADMIN — TIOTROPIUM BROMIDE AND OLODATEROL 2 PUFF: 3.124; 2.736 SPRAY, METERED RESPIRATORY (INHALATION) at 10:49

## 2022-10-16 RX ADMIN — SODIUM CHLORIDE, PRESERVATIVE FREE 10 ML: 5 INJECTION INTRAVENOUS at 21:03

## 2022-10-16 RX ADMIN — IPRATROPIUM BROMIDE AND ALBUTEROL SULFATE 3 ML: 2.5; .5 SOLUTION RESPIRATORY (INHALATION) at 10:25

## 2022-10-16 RX ADMIN — IPRATROPIUM BROMIDE AND ALBUTEROL SULFATE 3 ML: 2.5; .5 SOLUTION RESPIRATORY (INHALATION) at 20:24

## 2022-10-16 RX ADMIN — PANTOPRAZOLE SODIUM 40 MG: 40 TABLET, DELAYED RELEASE ORAL at 06:58

## 2022-10-16 RX ADMIN — METHYLPREDNISOLONE SODIUM SUCCINATE 60 MG: 125 INJECTION, POWDER, FOR SOLUTION INTRAMUSCULAR; INTRAVENOUS at 23:52

## 2022-10-16 RX ADMIN — IPRATROPIUM BROMIDE AND ALBUTEROL SULFATE 3 ML: 2.5; .5 SOLUTION RESPIRATORY (INHALATION) at 05:25

## 2022-10-16 RX ADMIN — Medication 1000 MG: at 09:51

## 2022-10-16 RX ADMIN — LEVOTHYROXINE SODIUM 200 MCG: 100 TABLET ORAL at 06:58

## 2022-10-16 RX ADMIN — SODIUM CHLORIDE, PRESERVATIVE FREE 10 ML: 5 INJECTION INTRAVENOUS at 09:55

## 2022-10-16 RX ADMIN — METHYLPREDNISOLONE SODIUM SUCCINATE 60 MG: 125 INJECTION, POWDER, FOR SOLUTION INTRAMUSCULAR; INTRAVENOUS at 15:09

## 2022-10-16 RX ADMIN — DULOXETINE HYDROCHLORIDE 60 MG: 60 CAPSULE, DELAYED RELEASE ORAL at 09:51

## 2022-10-16 RX ADMIN — POTASSIUM BICARBONATE 20 MEQ: 782 TABLET, EFFERVESCENT ORAL at 21:02

## 2022-10-16 RX ADMIN — SERTRALINE HYDROCHLORIDE 50 MG: 50 TABLET ORAL at 09:51

## 2022-10-16 RX ADMIN — FAMOTIDINE 20 MG: 20 TABLET ORAL at 21:03

## 2022-10-16 RX ADMIN — HYDROCODONE BITARTRATE AND ACETAMINOPHEN 1 TABLET: 10; 325 TABLET ORAL at 21:03

## 2022-10-16 RX ADMIN — HYDROXYCHLOROQUINE SULFATE 300 MG: 200 TABLET ORAL at 09:51

## 2022-10-16 RX ADMIN — Medication 1000 MG: at 21:02

## 2022-10-16 RX ADMIN — IPRATROPIUM BROMIDE AND ALBUTEROL SULFATE 3 ML: 2.5; .5 SOLUTION RESPIRATORY (INHALATION) at 15:52

## 2022-10-16 RX ADMIN — TRAZODONE HYDROCHLORIDE 200 MG: 50 TABLET ORAL at 21:02

## 2022-10-16 RX ADMIN — AMLODIPINE BESYLATE 10 MG: 10 TABLET ORAL at 09:51

## 2022-10-16 RX ADMIN — FAMOTIDINE 20 MG: 20 TABLET ORAL at 09:51

## 2022-10-16 RX ADMIN — GUAIFENESIN AND DEXTROMETHORPHAN 5 ML: 100; 10 SYRUP ORAL at 06:58

## 2022-10-16 RX ADMIN — ENOXAPARIN SODIUM 40 MG: 100 INJECTION SUBCUTANEOUS at 09:51

## 2022-10-16 RX ADMIN — HYDROCODONE BITARTRATE AND ACETAMINOPHEN 1 TABLET: 10; 325 TABLET ORAL at 15:09

## 2022-10-16 RX ADMIN — POTASSIUM BICARBONATE 20 MEQ: 782 TABLET, EFFERVESCENT ORAL at 09:50

## 2022-10-16 RX ADMIN — PREGABALIN 300 MG: 75 CAPSULE ORAL at 09:51

## 2022-10-16 ASSESSMENT — PAIN DESCRIPTION - FREQUENCY
FREQUENCY: INTERMITTENT
FREQUENCY: CONTINUOUS
FREQUENCY: INTERMITTENT

## 2022-10-16 ASSESSMENT — PAIN DESCRIPTION - ONSET
ONSET: ON-GOING

## 2022-10-16 ASSESSMENT — PAIN SCALES - GENERAL
PAINLEVEL_OUTOF10: 9
PAINLEVEL_OUTOF10: 8
PAINLEVEL_OUTOF10: 7
PAINLEVEL_OUTOF10: 9
PAINLEVEL_OUTOF10: 8

## 2022-10-16 ASSESSMENT — PAIN - FUNCTIONAL ASSESSMENT
PAIN_FUNCTIONAL_ASSESSMENT: ACTIVITIES ARE NOT PREVENTED

## 2022-10-16 ASSESSMENT — PAIN DESCRIPTION - LOCATION
LOCATION: BACK

## 2022-10-16 ASSESSMENT — PAIN DESCRIPTION - DESCRIPTORS
DESCRIPTORS: ACHING

## 2022-10-16 ASSESSMENT — PAIN DESCRIPTION - PAIN TYPE
TYPE: CHRONIC PAIN

## 2022-10-16 ASSESSMENT — PAIN DESCRIPTION - ORIENTATION
ORIENTATION: LOWER

## 2022-10-16 NOTE — PROGRESS NOTES
Physical Therapy  Facility/Department: [address] [place] [address] MED SURG  Daily Treatment Note  NAME: Melony Lopez  : 1945  MRN: [id number]    Date of Service: 10/16/2022    Discharge Recommendations:  Continue to assess pending progress, Subacute/Skilled Nursing Facility   PT Equipment Recommendations  Equipment Needed: No    Patient Diagnosis(es): The primary encounter diagnosis was Pneumonia due to infectious organism, unspecified laterality, unspecified part of lung. Diagnoses of CO2 retention and Confusion were also pertinent to this visit. Assessment   Activity Tolerance: Patient tolerated treatment well;Patient limited by fatigue;Patient limited by endurance     Plan    Physcial Therapy Plan  General Plan: 2 times a day 7 days a week  Current Treatment Recommendations: Strengthening;Balance training;Functional mobility training;Transfer training;ADL/Self-care training;Neuromuscular re-education;Gait training; Safety education & training;Patient/Caregiver education & training     Restrictions  Restrictions/Precautions  Restrictions/Precautions: General Precautions  Required Braces or Orthoses?: No     Subjective    Subjective  Subjective: Pt up in chair upon arrival.  Pt states she has been coughing all night and is sore. Pt is agreeable to B LE ther ex in sitting. Pain: no complaint of pain this date  Orientation  Overall Orientation Status: Within Functional Limits  Cognition  Overall Cognitive Status: WNL     Objective   Vitals     Bed Mobility Training  Bed Mobility Training: No  Balance  Sitting: Intact  Transfer Training  Transfer Training: No           Safety Devices  Type of Devices: Call light within reach; Left in chair;Chair alarm in place  Restraints  Restraints Initially in Place: No       Goals  Short Term Goals  Time Frame for Short Term Goals: 3 DAYS. Short Term Goal 1: CGA GAIT 50 FT FWW  Short Term Goal 2: SBA TRANSFERS AND SBA BED MOBILITY.   Long Term Goals  Time Frame for Long Term Goals : 4 WEEKS  Long Term Goal 1: IND GAIT  FT,  Long Term Goal 2: IND TRANSFERS  Patient Goals   Patient Goals : RETURN HOME WITH ASSIST FROM .     Education  Patient Education  Education Given To: Patient  Education Provided: Role of Therapy  Education Method: Verbal;Demonstration  Education Outcome: Verbalized understanding;Continued education needed    Therapy Time   Individual Concurrent Group Co-treatment   Time In 0933         Time Out 0956         Minutes [address] [place] [address]

## 2022-10-16 NOTE — PROGRESS NOTES
Medications administered as ordered. Pt remains in bed watching television and denies any other needs at this time. Call light remains within reach.

## 2022-10-16 NOTE — PLAN OF CARE
Problem: Discharge Planning  Goal: Discharge to home or other facility with appropriate resources  Outcome: Progressing  Flowsheets (Taken 10/16/2022 1521)  Discharge to home or other facility with appropriate resources:   Identify barriers to discharge with patient and caregiver   Arrange for needed discharge resources and transportation as appropriate   Identify discharge learning needs (meds, wound care, etc)   Refer to discharge planning if patient needs post-hospital services based on physician order or complex needs related to functional status, cognitive ability or social support system     Problem: Safety - Adult  Goal: Free from fall injury  Outcome: Progressing  Flowsheets (Taken 10/14/2022 0032 by Helena Bobby RN)  Free From Fall Injury: Instruct family/caregiver on patient safety     Problem: Skin/Tissue Integrity  Goal: Absence of new skin breakdown  Description: 1. Monitor for areas of redness and/or skin breakdown  2. Assess vascular access sites hourly  3. Every 4-6 hours minimum:  Change oxygen saturation probe site  4. Every 4-6 hours:  If on nasal continuous positive airway pressure, respiratory therapy assess nares and determine need for appliance change or resting period. Outcome: Progressing  Note: Eusebio scale monitoring per protocol. Inspect skin for breakdown frequently. Encourage pt to make frequent large adjustments in position or assist patient with turning. Document all areas of breakdown.

## 2022-10-16 NOTE — PROGRESS NOTES
Patient awake and up to the chair. Patient c/o 8/10 pain, see mar. Heating pad also on lower back. Patient developed a coughing spell and had a difficult time breathing. Patient given some water and educated on deep breathing. Patient able to calm down and decrease SOB. Writer educated on Robitussin, patient initially refused medication but developed another coughing spell and decided to take it. Patient denies any other needs at this time.

## 2022-10-16 NOTE — PLAN OF CARE
Problem: Discharge Planning  Goal: Discharge to home or other facility with appropriate resources  Outcome: Progressing     Problem: Safety - Adult  Goal: Free from fall injury  Outcome: Progressing  Note: Pt bed in lowest position with wheels locked. Call light within reach. Bed alarm in place. Room remains free of obstacles. Pt reminded to use call light as needed, verbalizes understanding. Will continue to monitor. Problem: ABCDS Injury Assessment  Goal: Absence of physical injury  Outcome: Progressing     Problem: Skin/Tissue Integrity  Goal: Absence of new skin breakdown  Description: 1. Monitor for areas of redness and/or skin breakdown  2. Assess vascular access sites hourly  3. Every 4-6 hours minimum:  Change oxygen saturation probe site  4. Every 4-6 hours:  If on nasal continuous positive airway pressure, respiratory therapy assess nares and determine need for appliance change or resting period. Outcome: Progressing  Note: Skin assessment done as charted. Problem: Pain  Goal: Verbalizes/displays adequate comfort level or baseline comfort level  Outcome: Progressing  Flowsheets (Taken 10/15/2022 2154)  Verbalizes/displays adequate comfort level or baseline comfort level:   Encourage patient to monitor pain and request assistance   Assess pain using appropriate pain scale   Administer analgesics based on type and severity of pain and evaluate response  Note: Pain assessments provided. PRN Norco administered as indicated per order.       Problem: Nutrition Deficit:  Goal: Optimize nutritional status  Outcome: Progressing

## 2022-10-16 NOTE — PROGRESS NOTES
Pt assisted to bathroom and back to bed using Arina Weinberg. Pt has scant amount of urine in brief and then urinated in toilet but again missed hat. Pt repositioned in bed. Bedside table and call light remain within reach, will monitor.

## 2022-10-16 NOTE — PROGRESS NOTES
Pt is sitting up in chair. Fresh water provided. Pt watching television and denies any other needs at this time.

## 2022-10-16 NOTE — PROGRESS NOTES
Patient awake and in bed. C/o 8/10 pain in the lower back, see mar. VS and assessment completed. Patient extremely pleasant to converse with. Writer provided education to patient. Denies any other needs at this time.  Aware to use call light if she does need anything

## 2022-10-16 NOTE — PROGRESS NOTES
Writer at bedside for shift assessment. Patient sitting up in chair, respirations are even and unlabored while on 2L. Vitals obtained and assessment completed, see flowsheet for details. Patient complains of pain and states that it is chronic. pt denies further needs at this time. Call light in reach, will continue to monitor.

## 2022-10-16 NOTE — PROGRESS NOTES
[facility] UN[facility]   APRN - Progress Note    Patient - Brayan Lopez  Date of Admission -  10/8/2022 10:06 AM  Date of Evaluation -  10/16/2022  Hospital Day - 8      SUBJECTIVE:     The Brayan Lopez is a 69 y.o. female sitting up in chair alert and no distress. Coughing but unable to expectorate       ROS:   Constitutional: negative  for fevers, and negative for chills. Respiratory: positive for shortness of breath, positive for cough, and negative for wheezing  Cardiovascular: negative for chest pain, and negative for palpitations  Gastrointestinal: negative for abdominal pain, negative for nausea,negative for vomiting, negative for diarrhea, and positive for constipation    All other systems were reviewed with the patient and are negative unless otherwise stated in HPI. OBJECTIVE:     VITAL SIGNS:  Patient Vitals for the past 8 hrs:   BP Temp Temp src Pulse Resp SpO2 Weight   10/16/22 0652 (!) 107/95 98.4 °F (36.9 °C) Temporal 94 26 96 % --   10/16/22 0528 -- -- -- 88 20 97 % --   10/16/22 0355 -- -- -- -- -- -- 156 lb 8.4 oz (71 kg)   10/16/22 0020 -- -- -- -- 20 -- --         Temp: 98.4 °F (36.9 °C)  Temp range:    Temp  Av.6 °F (36.4 °C)  Min: 96.8 °F (36 °C)  Max: 98.4 °F (36.9 °C)    BP: (!) 107/95  BP Range:      Systolic (09COD), ZLL:198 , Min:107 , IYE:111      Diastolic (14ALX), EMX:28, Min:64, Max:95    Heart Rate: 94  Pulse Range:    Pulse  Av.6  Min: 75  Max: 100    Resp: 26  Resp Range:   Resp  Av.1  Min: 20  Max: 26    SpO2: 96 % on supplemental O2  SpO2 range:   SpO2  Av.7 %  Min: 92 %  Max: 98 %    Weight  Wt Readings from Last 3 Encounters:   10/16/22 156 lb 8.4 oz (71 kg)   22 143 lb (64.9 kg)   22 143 lb (64.9 kg)     Body mass index is 26.05 kg/m².     24HR INTAKE/OUTPUT:      Intake/Output Summary (Last 24 hours) at 10/16/2022 0751  Last data filed at 10/16/2022 0520  Gross per 24 hour   Intake 1300 ml   Output 1300 ml   Net 0 ml     Date 10/16/22 0000 - 10/16/22 2359   Shift 6369-3932 9801-4406 1117-3573 24 Hour Total   INTAKE   P.O. 300   300   Shift Total(mL/kg) 300(4.2)   300(4.2)   OUTPUT   Shift Total(mL/kg)       Weight (kg) 71 71 71 71         PHYSICAL EXAM:  GEN:    Awake and following commands:     [] No   [x] Yes  MENTAL STATUS: alert and oriented x3. DISTRESS: Acute respiratory distress:       [x] No   [] Yes  EYES:  EOMI, pupils equal   NECK: Supple. No lymphadenopathy. No carotid bruit  CVS:    regular but tachycardic, no audible murmur  PULM: rhonchi and wheeze throughout no acute respiratory distress  ABD:    Bowels sounds normal.  Abdomen is soft. No distention. no tenderness to palpation. EXT:   trace edema bilaterally . No calf tenderness. NEURO: Moves all extremities. Motor and sensory are grossly intact  SKIN:  No rashes. No skin lesions.           MEDICATIONS:  Scheduled Meds:   methylPREDNISolone  60 mg IntraVENous Q8H    bisacodyl  10 mg Oral Once    magnesium hydroxide  30 mL Oral Once    amLODIPine  10 mg Oral Daily    DULoxetine  60 mg Oral Daily    hydroxychloroquine  300 mg Oral Daily    levETIRAcetam  1,000 mg Oral BID    levothyroxine  200 mcg Oral Daily    pantoprazole  40 mg Oral QAM AC    potassium bicarb-citric acid  20 mEq Oral BID    pregabalin  300 mg Oral BID    sertraline  50 mg Oral Daily    traZODone  200 mg Oral Nightly    tiotropium-olodaterol  2 puff Inhalation Daily    sodium chloride flush  5-40 mL IntraVENous 2 times per day    famotidine  20 mg Oral BID    enoxaparin  40 mg SubCUTAneous Daily    ipratropium-albuterol  3 mL Inhalation 4x daily     Continuous Infusions:   sodium chloride Stopped (10/11/22 0643)     PRN Meds:   HYDROcodone-acetaminophen, 1 tablet, Q4H PRN  polyethylene glycol, 17 g, Daily PRN  guaiFENesin-dextromethorphan, 5 mL, Q4H PRN  Benzocaine-Menthol, 1 lozenge, Q2H PRN  ondansetron, 4 mg, Q8H PRN  sodium chloride flush, 5-40 mL, PRN  sodium chloride, 25 mL, PRN  acetaminophen, 650 mg, Q6H PRN   Or  acetaminophen, 650 mg, Q6H PRN  albuterol, 2.5 mg, Q4H PRN      DATA:  Complete Blood Count:   Recent Labs     10/14/22  0948 10/15/22  0728 10/16/22  0515   WBC 5.0 6.0 5.1   RBC 4.06 4.03 3.93*   HGB 12.5 12.2 11.8*   HCT 38.7 37.3 37.1   MCV 95.3 92.6 94.4   RDW 13.4 13.2 13.2    220 234   SEGS 83* 90* PENDING   NEUTROABS 4.13 5.40 PENDING   LYMPHOPCT 10* 9* PENDING   LYMPHSABS 0.52* 0.54* PENDING   MONOPCT 4 1* PENDING   EOSRELPCT 2 0* PENDING   BASOPCT 1 0 PENDING   IMMGRAN 0 0 PENDING        Recent Blood Glucose:   Recent Labs     10/14/22  0948 10/15/22  0728 10/16/22  0515   GLUCOSE 90 168* 160*        Comprehensive Metabolic Profile:   Recent Labs     10/14/22  0948 10/15/22  0728 10/16/22  0515   BUN 11 14 17   CREATININE 0.59 0.51 0.48*    139 144   K 3.9 3.9 4.1    98 103   CALCIUM 10.4 9.8 10.1   ANIONGAP 8* 9 7*   CO2 33* 32* 34*   PROT 7.1 7.2 7.1   LABALBU 4.0 4.2 4.1   BILITOT 0.2* <0.1* <0.1*   ALKPHOS 87 88 80   AST 18 17 23   ALT 15 20 27        Urinalysis:   Lab Results   Component Value Date/Time    NITRU NEGATIVE 10/08/2022 11:24 AM    COLORU Yellow 10/08/2022 11:24 AM    PHUR 6.0 10/08/2022 11:24 AM    WBCUA 0 TO 2 09/09/2022 09:29 PM    RBCUA 0 TO 2 09/09/2022 09:29 PM    MUCUS NOT REPORTED 10/14/2021 08:07 PM    TRICHOMONAS NOT REPORTED 10/14/2021 08:07 PM    YEAST NOT REPORTED 10/14/2021 08:07 PM    BACTERIA 1+ 09/09/2022 09:29 PM    SPECGRAV 1.015 10/08/2022 11:24 AM    LEUKOCYTESUR NEGATIVE 10/08/2022 11:24 AM    UROBILINOGEN Normal 10/08/2022 11:24 AM    BILIRUBINUR NEGATIVE 10/08/2022 11:24 AM    GLUCOSEU NEGATIVE 10/08/2022 11:24 AM    KETUA NEGATIVE 10/08/2022 11:24 AM    AMORPHOUS 2+ 09/09/2022 09:29 PM       HgBA1c:    Lab Results   Component Value Date/Time    LABA1C 5.7 06/20/2022 10:00 AM       TSH:    Lab Results   Component Value Date/Time    TSH 11.11 06/20/2022 10:01 AM       Lactic Acid:   Lab Results   Component Value Date/Time    LACTA 1.9 04/28/2022 03:14 PM    LACTA 2.0 02/06/2022 10:51 AM    LACTA 2.5 02/05/2022 03:30 PM        High Sensitivity Troponin:  No results for input(s): TROPHS in the last 72 hours. Pro-BNP:  Lab Results   Component Value Date    PROBNP 117 10/08/2022     D-Dimer:  Lab Results   Component Value Date    DDIMER 0.69 (H) 01/18/2022     PT/INR:    Lab Results   Component Value Date/Time    PROTIME 13.0 10/08/2022 11:01 AM    INR 1.0 10/08/2022 11:01 AM     PTT:    Lab Results   Component Value Date/Time    APTT 28.7 10/08/2022 11:01 AM       CRP: No results for input(s): CRP in the last 72 hours. ABGs:   Lab Results   Component Value Date/Time    PHART 7.400 10/14/2022 07:55 AM    TJQ6NJF 51.4 10/14/2022 07:55 AM    PO2ART 99.0 10/14/2022 07:55 AM    JED4QUD 31.1 10/14/2022 07:55 AM    VKQ6ZRY 35 11/02/2020 04:16 AM    O9OXLXMC 97.4 10/14/2022 07:55 AM    FIO2 28 10/10/2022 05:30 AM         Radiology/Imaging:  XR CHEST (2 VW)   Final Result   Nonspecific reticulonodular airspace opacities could reflect pulmonary edema,   pneumonitis/pneumonia including viral/atypical etiology or combination there   of.         FL MODIFIED BARIUM SWALLOW W VIDEO   Final Result   Swallowing mechanism grossly within normal limits without evidence of   aspiration. 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 Consider barium esophagram follow-up if   indicated. Please see separate speech pathology report for full discussion of findings   and recommendations. RECOMMENDATIONS:   Unavailable         CT Head W/O Contrast   Final Result   No acute process. No significant change in primarily low-density left   convexity extra-axial collection. XR CHEST PORTABLE   Final Result   Suspected bilateral infectious/inflammatory airways process.                ASSESSMENT / PLAN:     Sepsis due to pneumonia ([address]  Continue current therapy  Continue IV Zosyn  Continue IV Levaquin  Stop IV fluids  Trend labs-improving  Remove Sheikh  Acute on chronic respiratory failure with hypoxia and hypercapnia  Trend ABGs-improving  Wean oxygen  CPAP intemittant-refused  Mucomyst neb x 2 doses given  Start Chest Vest  COPD/pulmonary fibrosis  Continue Anoro Ellipta, duo nebs  Hypertension  Continue amlodipine  Chronic pain syndrome  Continue Norco  Nutrition status:   at risk for malnutrition  Dietician consult initiated  [] NPO [] TPN      [] Tube feed [] Clear liquid        [] Full liquid [x] regular diet         [] Fluid restriction   [] Diabetic diet   Prophylaxis:   DVT: Lovenox   Stress Ulcer: H2 Blocker   High risk medications: none   Disposition:    Discharge plan is [place]  She is approved by Insurance at this time        RYAN Hamilton - CNP , RYAN-NP-C  10/16/2022  7:51 AM

## 2022-10-16 NOTE — PROGRESS NOTES
Writer entered room to administer scheduled medication. Pt had been resting in bed but when woke up stated she needed to use bathroom. Pt assisted to stand and pivot to bedside commode due to patient complaint of urgency. Pt voided 1000 ml clear yellow urine then returned to bed and positioned self. VS obtained and pt reassessed at this time. PRN Norco administered for pain. SPO2 96% on 2 LPM nasal cannula. Writer spoke with patient about placing CPAP at this time before she falls back asleep but pt refuses. Pt denies any other needs. Call light and bedside table remain within reach, will monitor.

## 2022-10-16 NOTE — PROGRESS NOTES
Occupational Therapy  Facility/Department: [facility] AT THE [facility] MED SURG  Daily Treatment Note  NAME: Stephanie Wright  : 1945  MRN: [id number]    Date of Service: 10/16/2022    Discharge Recommendations:  Continue to assess pending progress, Subacute/Skilled Nursing Facility, Home with Home health OT         Patient Diagnosis(es): The primary encounter diagnosis was Pneumonia due to infectious organism, unspecified laterality, unspecified part of lung. Diagnoses of CO2 retention and Confusion were also pertinent to this visit. Assessment    Activity Tolerance: Patient tolerated treatment well;Patient limited by fatigue;Patient limited by endurance  Discharge Recommendations: Continue to assess pending progress;Subacute/Skilled Nursing Facility;Home with Home health OT      Plan   Occupational Therapy Plan  Times Per Week: 7  Times Per Day: Once a day  Current Treatment Recommendations: Strengthening; Functional mobility training;Self-Care / ADL; Safety education & training; Endurance training     Restrictions  Restrictions/Precautions  Restrictions/Precautions: General Precautions    Subjective   Subjective  Subjective: Pt sitting up in bedside chair upon arrival. Pt agreed to participate in therapy session. Pt states \"I can't seem to get rid of my cough\". Pain: Pt reported 8/10 low back pain. Objective    Vitals              OT Exercises  Exercise Treatment: Pt tolerated BUE ther ex with 1# dumbbell x 7 planes x 15 reps x 1 set to increase UE strength and endurance in order to ease completion of ADL tasks. Pt required RBs as needed secondary to fatigue. Safety Devices  Type of Devices: Call light within reach; Left in chair;Chair alarm in place     Patient Education  Education Given To: Patient  Education Provided: Role of Therapy;Plan of Care;Energy Conservation  Education Method: Demonstration  Barriers to Learning: None  Education Outcome: Verbalized understanding;Continued education needed    Goals  Short Term Goals  Time Frame for Short Term Goals: 20 visits  Short Term Goal 1: Patient to tolerate 15 minutes ther-ex/ther-act to increase ease of ADL participation. Short Term Goal 2: Patient to complete standing sinkside ADLs with SBA. Short Term Goal 3: Patient to complete LB bathing/dressing with SBA. Short Term Goal 4: Pt will be educated on EC strategies and breathing techniques to decrease fatigue and improve participation in ADL's.        Therapy Time   Individual Concurrent Group Co-treatment   Time In 0730         Time Out 0753         Minutes 23                 EB Mcdonough/BOB

## 2022-10-17 PROCEDURE — 97110 THERAPEUTIC EXERCISES: CPT

## 2022-10-17 PROCEDURE — 94664 DEMO&/EVAL PT USE INHALER: CPT

## 2022-10-17 PROCEDURE — 6360000002 HC RX W HCPCS: Performed by: INTERNAL MEDICINE

## 2022-10-17 PROCEDURE — 1200000000 HC SEMI PRIVATE

## 2022-10-17 PROCEDURE — 97116 GAIT TRAINING THERAPY: CPT

## 2022-10-17 PROCEDURE — 2700000000 HC OXYGEN THERAPY PER DAY

## 2022-10-17 PROCEDURE — 97535 SELF CARE MNGMENT TRAINING: CPT

## 2022-10-17 PROCEDURE — 2580000003 HC RX 258: Performed by: INTERNAL MEDICINE

## 2022-10-17 PROCEDURE — 99223 1ST HOSP IP/OBS HIGH 75: CPT | Performed by: INTERNAL MEDICINE

## 2022-10-17 PROCEDURE — 94669 MECHANICAL CHEST WALL OSCILL: CPT

## 2022-10-17 PROCEDURE — 97530 THERAPEUTIC ACTIVITIES: CPT

## 2022-10-17 PROCEDURE — 6370000000 HC RX 637 (ALT 250 FOR IP): Performed by: INTERNAL MEDICINE

## 2022-10-17 PROCEDURE — 87205 SMEAR GRAM STAIN: CPT

## 2022-10-17 PROCEDURE — 87077 CULTURE AEROBIC IDENTIFY: CPT

## 2022-10-17 PROCEDURE — 87186 SC STD MICRODIL/AGAR DIL: CPT

## 2022-10-17 PROCEDURE — 94761 N-INVAS EAR/PLS OXIMETRY MLT: CPT

## 2022-10-17 PROCEDURE — 94640 AIRWAY INHALATION TREATMENT: CPT

## 2022-10-17 PROCEDURE — 87070 CULTURE OTHR SPECIMN AEROBIC: CPT

## 2022-10-17 RX ADMIN — HYDROCODONE BITARTRATE AND ACETAMINOPHEN 1 TABLET: 10; 325 TABLET ORAL at 21:56

## 2022-10-17 RX ADMIN — LEVOTHYROXINE SODIUM 200 MCG: 100 TABLET ORAL at 08:54

## 2022-10-17 RX ADMIN — POTASSIUM BICARBONATE 20 MEQ: 782 TABLET, EFFERVESCENT ORAL at 21:56

## 2022-10-17 RX ADMIN — Medication 1000 MG: at 08:55

## 2022-10-17 RX ADMIN — DULOXETINE HYDROCHLORIDE 60 MG: 60 CAPSULE, DELAYED RELEASE ORAL at 08:55

## 2022-10-17 RX ADMIN — FAMOTIDINE 20 MG: 20 TABLET ORAL at 08:54

## 2022-10-17 RX ADMIN — IPRATROPIUM BROMIDE AND ALBUTEROL SULFATE 3 ML: 2.5; .5 SOLUTION RESPIRATORY (INHALATION) at 16:57

## 2022-10-17 RX ADMIN — TIOTROPIUM BROMIDE AND OLODATEROL 2 PUFF: 3.124; 2.736 SPRAY, METERED RESPIRATORY (INHALATION) at 10:19

## 2022-10-17 RX ADMIN — IPRATROPIUM BROMIDE AND ALBUTEROL SULFATE 3 ML: 2.5; .5 SOLUTION RESPIRATORY (INHALATION) at 10:19

## 2022-10-17 RX ADMIN — POTASSIUM BICARBONATE 20 MEQ: 782 TABLET, EFFERVESCENT ORAL at 08:54

## 2022-10-17 RX ADMIN — IPRATROPIUM BROMIDE AND ALBUTEROL SULFATE 3 ML: 2.5; .5 SOLUTION RESPIRATORY (INHALATION) at 05:22

## 2022-10-17 RX ADMIN — AMLODIPINE BESYLATE 10 MG: 10 TABLET ORAL at 08:54

## 2022-10-17 RX ADMIN — IPRATROPIUM BROMIDE AND ALBUTEROL SULFATE 3 ML: 2.5; .5 SOLUTION RESPIRATORY (INHALATION) at 20:48

## 2022-10-17 RX ADMIN — PREGABALIN 300 MG: 75 CAPSULE ORAL at 21:56

## 2022-10-17 RX ADMIN — METHYLPREDNISOLONE SODIUM SUCCINATE 60 MG: 125 INJECTION, POWDER, FOR SOLUTION INTRAMUSCULAR; INTRAVENOUS at 09:18

## 2022-10-17 RX ADMIN — HYDROXYCHLOROQUINE SULFATE 300 MG: 200 TABLET ORAL at 08:54

## 2022-10-17 RX ADMIN — METHYLPREDNISOLONE SODIUM SUCCINATE 60 MG: 125 INJECTION, POWDER, FOR SOLUTION INTRAMUSCULAR; INTRAVENOUS at 15:26

## 2022-10-17 RX ADMIN — PANTOPRAZOLE SODIUM 40 MG: 40 TABLET, DELAYED RELEASE ORAL at 08:54

## 2022-10-17 RX ADMIN — METHYLPREDNISOLONE SODIUM SUCCINATE 60 MG: 125 INJECTION, POWDER, FOR SOLUTION INTRAMUSCULAR; INTRAVENOUS at 21:56

## 2022-10-17 RX ADMIN — HYDROCODONE BITARTRATE AND ACETAMINOPHEN 1 TABLET: 10; 325 TABLET ORAL at 13:33

## 2022-10-17 RX ADMIN — FAMOTIDINE 20 MG: 20 TABLET ORAL at 21:57

## 2022-10-17 RX ADMIN — SODIUM CHLORIDE, PRESERVATIVE FREE 10 ML: 5 INJECTION INTRAVENOUS at 21:57

## 2022-10-17 RX ADMIN — TRAZODONE HYDROCHLORIDE 200 MG: 50 TABLET ORAL at 21:56

## 2022-10-17 RX ADMIN — SERTRALINE HYDROCHLORIDE 50 MG: 50 TABLET ORAL at 08:54

## 2022-10-17 RX ADMIN — Medication 1000 MG: at 21:56

## 2022-10-17 RX ADMIN — SODIUM CHLORIDE, PRESERVATIVE FREE 10 ML: 5 INJECTION INTRAVENOUS at 08:55

## 2022-10-17 RX ADMIN — ENOXAPARIN SODIUM 40 MG: 100 INJECTION SUBCUTANEOUS at 08:55

## 2022-10-17 RX ADMIN — PREGABALIN 300 MG: 75 CAPSULE ORAL at 08:54

## 2022-10-17 ASSESSMENT — PAIN DESCRIPTION - FREQUENCY
FREQUENCY: INTERMITTENT

## 2022-10-17 ASSESSMENT — PAIN SCALES - GENERAL
PAINLEVEL_OUTOF10: 6
PAINLEVEL_OUTOF10: 7
PAINLEVEL_OUTOF10: 2
PAINLEVEL_OUTOF10: 9

## 2022-10-17 ASSESSMENT — PAIN DESCRIPTION - ONSET
ONSET: ON-GOING

## 2022-10-17 ASSESSMENT — PAIN DESCRIPTION - DESCRIPTORS
DESCRIPTORS: ACHING

## 2022-10-17 ASSESSMENT — PAIN DESCRIPTION - ORIENTATION
ORIENTATION: LOWER

## 2022-10-17 ASSESSMENT — PAIN DESCRIPTION - PAIN TYPE
TYPE: CHRONIC PAIN
TYPE: CHRONIC PAIN

## 2022-10-17 ASSESSMENT — PAIN DESCRIPTION - LOCATION
LOCATION: BACK

## 2022-10-17 ASSESSMENT — PAIN SCALES - WONG BAKER
WONGBAKER_NUMERICALRESPONSE: 0
WONGBAKER_NUMERICALRESPONSE: 0

## 2022-10-17 NOTE — PROGRESS NOTES
RESPIRATORY ASSESSMENT PROTOCOL                                                                                              Patient Name: Gael Ann Room#: 5384/1643-41 : 1945     Admitting diagnosis: Confusion [R41.0]  CO2 retention [E87.29]  Sepsis due to pneumonia ([facility] [J18.9, A41.9]  Pneumonia due to infectious organism, unspecified laterality, unspecified part of lung [J18.9]       Medical History:   Past Medical History:   Diagnosis Date    A-fib ([place] [address]     Biventricular congestive heart failure (HCC)     Bronchiectasis with acute exacerbation ([facility] 2022    CAD (coronary artery disease)     Chronic pain syndrome     dilaudid infusion pump    COPD (chronic obstructive pulmonary disease) (HCC)     Depression     GERD (gastroesophageal reflux disease)     Hypothyroidism     Impaired fasting glucose     Iron deficiency anemia     Osteoporosis     Pulmonary fibrosis ([facility] 2022    Subdural hematoma ([place] [address] 2022       PATIENT ASSESSMENT    LABORATORY DATA  Hematology:   Lab Results   Component Value Date/Time    WBC 5.1 10/16/2022 05:15 AM    RBC 3.93 10/16/2022 05:15 AM    HGB 11.8 10/16/2022 05:15 AM    HCT 37.1 10/16/2022 05:15 AM     10/16/2022 05:15 AM     Chemistry:    Lab Results   Component Value Date/Time    PHART 7.400 10/14/2022 07:55 AM    EPM7ROH 51.4 10/14/2022 07:55 AM    PO2ART 99.0 10/14/2022 07:55 AM    X9PPAWRQ 97.4 10/14/2022 07:55 AM    JRA7FKE 31.1 10/14/2022 07:55 AM    PBEA 5.0 10/14/2022 07:55 AM       VITALS  Heart Rate: 88   Resp: 20  BP: (!) 133/0  SpO2: 98 % O2 Device: Nasal cannula  Temp: 98.1 °F (36.7 °C)    SKIN COLOR  [x] Normal  [] Pale  [] Dusky  [] Cyanotic    RESPIRATORY PATTERN  [x] Normal  [] Dyspnea  [] Cheyne-Granger  [] Kussmaul  [] Biots    AMBULATORY  [] Yes  [] No  [x] With Assistance    PEAK FLOW  Predicted:     Personal Best:        VITAL CAPACITY  Predicted value:  ml  Actual Value:  ml  30% of Predicted:  ml  Patient Acuity 0 1 2 3 4 Score   Level of Concious (LOC) [x]  Alert & Oriented or Pt normal LOC []  Confused;follows directions []  Confused & uncooper-ative []  Obtunded []  Comatose 0   Respiratory Rate  (RR) [x]  Reg. rate & pattern. 12 - 20 bpm  []  Increased RR. Greater than 20 bpm   []  SOB w/ exertion or RR greater than 24 bpm []  Access- ory muscle use at rest. Abn.  resp. []  SOB at rest.   0   Bilateral Breath Sounds (BBS) []  Clear []  Diminish-ed bases  []  Diminish-ed t/o, or rales   [x]  Sporadic, scattered wheezes or rhonchi []  Persistentwheezes and, or absent BBS 3   Cough []  Strong, effective, & non-prod. [x]  Effective & prod. Less than 25 ml (2 TBSP) over past 24 hrs []  Ineffective & non-prod to less than 25 ML over past 24 hrs []  Ineffective and, or greater than 25 ml sputum prod. past 24 hrs. []  Nonspon- taneous; Requires suctioning 1   Pulmonary History  (PULM HX) []  No smoking and no chronic pulmonaryhistory []  Former smoker. Quit over 12 mos. ago []  Current smoker or quit w/ in 12 mos []  Pulm. History and, or 20 pk/yr smoking hx [x]  Admitted w/ acute pulm. dx and, or has been admitted w/ pulm. dx 2 or more times over past 12 mos 4   Surgical History this Admit  (SURG HX) [x]  No surgery []  General surgery []  Lower abdominal []  Thoracic or upper abdominal   []  Thoracic w/ pulm. disease 0   Chest X-Ray (CXR)/CT Scan []  Clear or not applicable []  Not available []  Atelect- asis or pleural effusions []  Localized infiltrate or pulm. edema [x]  Con-solidated Infiltrates, bilateral, or in more than 1 lobe 4   Slow or Forced VC, FEV1 OR PEFR (PULM FXN)  [x]  80% or greater, or not indicated []  Pt. unable to perform []  FEV1 or PEFR or VC 51-79%.   []  FEV1 or PEFR or VC  30-49%   []  FEV1 or PEFR or VC less than 30%   0   TOTAL ACUITY: 12       CARE PLAN    If Acuity Level is 2, 3, or 4 in any of the following:    [] BILATERAL BREATH SOUNDS (BBS)     [x] PULMONARY HISTORY (PULM HX)  [] PULMONARY FUNCTION (PULM FX)    Goal: Improve respiratory functions in patients with airway disease and decrease WOB    [x] AEROSOL PROTOCOL    Total Acuity:   16-32  []  Secondary Assessment in 24 hrs Total Acuity:  9-15  [x]  Secondary Assessment in 24 hrs Total Acuity:  4-8  []  Secondary Assessment in 48 hrs Total Acuity:  0-3  []  Secondary Assessment in 72 hrs   HHN AEROSOL THERAPY with  [physician-ordered bronchodilator(s)] q 4 & Albuterol PRN q2 hrs. Breath-Actuated Neb if BBS Acuity = 4, and pt. can use MP. Notify physician if condition deteriorates. HHN AEROSOL THERAPY with  [physician-ordered bronchodilator(s)]  QID and Albuterol PRN q4 hrs. Breath-Actuated Neb if BBS Acuity = 4, and pt. can use MP. Notify physician if condition deteriorates. MDI THERAPY with  2 actuations of [physician-ordered bronchodilator(s)] via spacer TID Albuterol and PRNq4 hrs. If unable to utilize MDI: HHN [physician-ordered bronchodilator(s)] TID and Albuterol PRN q4 hrs. Notify physician if condition deteriorates. MDI THERAPY with  [physician-ordered bronchodilator(s)] via spacer TID PRN. If unable to utilize MDI: HHN [physician-ordered bronchodilator(s)] TID PRN. Notify physician if condition deteriorates. If Acuity Level is 2, 3, or 4 in any of the following:    [] COUGH     [] SURGICAL HISTORY (SURG HX)  [x] CHEST XRAY (CXR)    Goal: Improvement in sputum mobilization in patients with ineffective airway clearance. Reverse atelectasis.     [x] Bronchopulmonary Hygiene Protocol    Total Acuity:   16-32  []  Secondary Assessment in 24 hrs Total Acuity:  9-15  [x]  Secondary Assessment in 24 hrs Total Acuity:  4-8  []  Secondary Assessment in 48 hrs Total Acuity:  0-3  []  Secondary Assessment in 72 hrs   METANEB QID with [physician-ordered bronchodilator(s)] if CXR Acuity = 4; otherwise:  PD&P, PEP, or Vest QID & PRN  NT Sxn PRN for ineffective cough  METANEB QID with [physician-ordered bronchodilator(s)] if CXR Acuity = 4; otherwise:  PD&P, PEP, or Vest TID & PRN  NT Sxn PRN for ineffective cough  Instruct patient to self-perform IS q1hr WA   Directed Cough self-performed q1hr WA     If Acuity Level is 2 or above in the following:    [] PULMONARY HISTORY (PULM HX)    Goal: Assist patient in quitting smoking to slow or stop the progression of lung disease.     [] Smoking Cessation Protocol    SMOKING CESSATION EDUCATION provided according to policy UP_445: (marlyn with an X)  ____Yes    ____ No     ____ NA    Smoking Cessation Booklet given:  ____Yes  ____No ____Patient Brett Adams

## 2022-10-17 NOTE — PLAN OF CARE
Problem: Discharge Planning  Goal: Discharge to home or other facility with appropriate resources  Outcome: Progressing  Flowsheets (Taken 10/17/2022 0708)  Discharge to home or other facility with appropriate resources: Identify barriers to discharge with patient and caregiver     Problem: Safety - Adult  Goal: Free from fall injury  Outcome: Progressing  Flowsheets (Taken 10/17/2022 1035)  Free From Fall Injury: Instruct family/caregiver on patient safety     Problem: ABCDS Injury Assessment  Goal: Absence of physical injury  Outcome: Progressing  Flowsheets (Taken 10/17/2022 1035)  Absence of Physical Injury: Implement safety measures based on patient assessment     Problem: Skin/Tissue Integrity  Goal: Absence of new skin breakdown  Description: 1. Monitor for areas of redness and/or skin breakdown  2. Assess vascular access sites hourly  3. Every 4-6 hours minimum:  Change oxygen saturation probe site  4. Every 4-6 hours:  If on nasal continuous positive airway pressure, respiratory therapy assess nares and determine need for appliance change or resting period.   Outcome: Progressing     Problem: Pain  Goal: Verbalizes/displays adequate comfort level or baseline comfort level  Outcome: Progressing  Flowsheets (Taken 10/15/2022 2154 by Ariel Neumann RN)  Verbalizes/displays adequate comfort level or baseline comfort level:   Encourage patient to monitor pain and request assistance   Assess pain using appropriate pain scale   Administer analgesics based on type and severity of pain and evaluate response     Problem: Nutrition Deficit:  Goal: Optimize nutritional status  Outcome: Progressing

## 2022-10-17 NOTE — RT PROTOCOL NOTE
RESPIRATORY ASSESSMENT PROTOCOL                                                                                              Patient Name: Brent Dias Room#: 9404/5181-14 : 1945     Admitting diagnosis: Confusion [R41.0]  CO2 retention [E87.29]  Sepsis due to pneumonia ([facility] [J18.9, A41.9]  Pneumonia due to infectious organism, unspecified laterality, unspecified part of lung [J18.9]       Medical History:   Past Medical History:   Diagnosis Date    A-fib ([place] [id number].)     Biventricular congestive heart failure (HCC)     Bronchiectasis with acute exacerbation ([facility] 2022    CAD (coronary artery disease)     Chronic pain syndrome     dilaudid infusion pump    COPD (chronic obstructive pulmonary disease) (HCC)     Depression     GERD (gastroesophageal reflux disease)     Hypothyroidism     Impaired fasting glucose     Iron deficiency anemia     Osteoporosis     Pulmonary fibrosis ([facility] 2022    Subdural hematoma ([place] [address].) 2022       PATIENT ASSESSMENT    LABORATORY DATA  Hematology:   Lab Results   Component Value Date/Time    WBC 5.1 10/16/2022 05:15 AM    RBC 3.93 10/16/2022 05:15 AM    HGB 11.8 10/16/2022 05:15 AM    HCT 37.1 10/16/2022 05:15 AM     10/16/2022 05:15 AM     Chemistry:    Lab Results   Component Value Date/Time    PHART 7.400 10/14/2022 07:55 AM    UIB6WXL 51.4 10/14/2022 07:55 AM    PO2ART 99.0 10/14/2022 07:55 AM    N0JCZNQR 97.4 10/14/2022 07:55 AM    HOE8GAR 31.1 10/14/2022 07:55 AM    PBEA 5.0 10/14/2022 07:55 AM       VITALS  Heart Rate: 95   Resp: 22  BP: 137/71  SpO2: 93 % O2 Device: Nasal cannula  Temp: 98.7 °F (37.1 °C)    SKIN COLOR  [x] Normal  [] Pale  [] Dusky  [] Cyanotic    RESPIRATORY PATTERN  [x] Normal  [] Dyspnea  [] Cheyne-Granger  [] Kussmaul  [] Biots    AMBULATORY  [] Yes  [] No  [x] With Assistance    PEAK FLOW  Predicted:     Personal Best:        VITAL CAPACITY  Predicted value:  ml  Actual Value:  ml  30% of Predicted:  ml  Patient Acuity 0 1 2 3 4 Score   Level of Concious (LOC) [x]  Alert & Oriented or Pt normal LOC []  Confused;follows directions []  Confused & uncooper-ative []  Obtunded []  Comatose 0   Respiratory Rate  (RR) []  Reg. rate & pattern. 12 - 20 bpm  []  Increased RR. Greater than 20 bpm   [x]  SOB w/ exertion or RR greater than 24 bpm []  Access- ory muscle use at rest. Abn.  resp. []  SOB at rest.   2   Bilateral Breath Sounds (BBS) []  Clear []  Diminish-ed bases  []  Diminish-ed t/o, or rales   [x]  Sporadic, scattered wheezes or rhonchi []  Persistentwheezes and, or absent BBS 3   Cough []  Strong, effective, & non-prod. []  Effective & prod. Less than 25 ml (2 TBSP) over past 24 hrs [x]  Ineffective & non-prod to less than 25 ML over past 24 hrs []  Ineffective and, or greater than 25 ml sputum prod. past 24 hrs. []  Nonspon- taneous; Requires suctioning 2   Pulmonary History  (PULM HX) []  No smoking and no chronic pulmonaryhistory []  Former smoker. Quit over 12 mos. ago []  Current smoker or quit w/ in 12 mos []  Pulm. History and, or 20 pk/yr smoking hx [x]  Admitted w/ acute pulm. dx and, or has been admitted w/ pulm. dx 2 or more times over past 12 mos 4   Surgical History this Admit  (SURG HX) [x]  No surgery []  General surgery []  Lower abdominal []  Thoracic or upper abdominal   []  Thoracic w/ pulm. disease 0   Chest X-Ray (CXR)/CT Scan []  Clear or not applicable []  Not available []  Atelect- asis or pleural effusions [x]  Localized infiltrate or pulm. edema []  Con-solidated Infiltrates, bilateral, or in more than 1 lobe 3   Slow or Forced VC, FEV1 OR PEFR (PULM FXN)  [x]  80% or greater, or not indicated []  Pt. unable to perform []  FEV1 or PEFR or VC 51-79%.   []  FEV1 or PEFR or VC  30-49%   []  FEV1 or PEFR or VC less than 30%   0   TOTAL ACUITY: 14       CARE PLAN    If Acuity Level is 2, 3, or 4 in any of the following:    [x] BILATERAL BREATH SOUNDS (BBS)     [x] PULMONARY HISTORY (PULM HX)  [] PULMONARY FUNCTION (PULM FX)    Goal: Improve respiratory functions in patients with airway disease and decrease WOB    [x] AEROSOL PROTOCOL    Total Acuity:   16-32  []  Secondary Assessment in 24 hrs Total Acuity:  9-15  [x]  Secondary Assessment in 24 hrs Total Acuity:  4-8  []  Secondary Assessment in 48 hrs Total Acuity:  0-3  []  Secondary Assessment in 72 hrs   HHN AEROSOL THERAPY with  [physician-ordered bronchodilator(s)] q 4 & Albuterol PRN q2 hrs. Breath-Actuated Neb if BBS Acuity = 4, and pt. can use MP. Notify physician if condition deteriorates. HHN AEROSOL THERAPY with  [physician-ordered bronchodilator(s)]  QID and Albuterol PRN q4 hrs. Breath-Actuated Neb if BBS Acuity = 4, and pt. can use MP. Notify physician if condition deteriorates. MDI THERAPY with  2 actuations of [physician-ordered bronchodilator(s)] via spacer TID Albuterol and PRNq4 hrs. If unable to utilize MDI: HHN [physician-ordered bronchodilator(s)] TID and Albuterol PRN q4 hrs. Notify physician if condition deteriorates. MDI THERAPY with  [physician-ordered bronchodilator(s)] via spacer TID PRN. If unable to utilize MDI: HHN [physician-ordered bronchodilator(s)] TID PRN. Notify physician if condition deteriorates. If Acuity Level is 2, 3, or 4 in any of the following:    [x] COUGH     [] SURGICAL HISTORY (SURG HX)  [x] CHEST XRAY (CXR)    Goal: Improvement in sputum mobilization in patients with ineffective airway clearance. Reverse atelectasis.     [x] Bronchopulmonary Hygiene Protocol    Total Acuity:   16-32  []  Secondary Assessment in 24 hrs Total Acuity:  9-15  [x]  Secondary Assessment in 24 hrs Total Acuity:  4-8  []  Secondary Assessment in 48 hrs Total Acuity:  0-3  []  Secondary Assessment in 72 hrs   METANEB QID with [physician-ordered bronchodilator(s)] if CXR Acuity = 4; otherwise:  PD&P, PEP, or Vest QID & PRN  NT Sxn PRN for ineffective cough  METANEB QID with [physician-ordered bronchodilator(s)] if CXR Acuity = 4; otherwise:  PD&P, PEP, or Vest TID & PRN  NT Sxn PRN for ineffective cough  Instruct patient to self-perform IS q1hr WA   Directed Cough self-performed q1hr WA     If Acuity Level is 2 or above in the following:    [] PULMONARY HISTORY (PULM HX)    Goal: Assist patient in quitting smoking to slow or stop the progression of lung disease.     [] Smoking Cessation Protocol    SMOKING CESSATION EDUCATION provided according to policy [id number]: (marlyn with an X)  ____Yes    ____ No     ____ NA    Smoking Cessation Booklet given:  ____Yes  ____No ____Patient Rubén Corral

## 2022-10-17 NOTE — PROGRESS NOTES
Comprehensive Nutrition Assessment    Type and Reason for Visit:  Reassess    Nutrition Recommendations/Plan:   Small sips after bites for bolus transit  Save most of fluids for end of meal      Malnutrition Assessment:  Malnutrition Status: At risk for malnutrition (Comment) (10/10/22 0820)    Context:  Acute Illness     Findings of the 6 clinical characteristics of malnutrition:  Energy Intake:  Mild decrease in energy intake (Comment)  Weight Loss:  No significant weight loss     Body Fat Loss:  No significant body fat loss     Muscle Mass Loss:  No significant muscle mass loss    Fluid Accumulation:  Mild Extremities (non pitting BLE)   Strength:  Not Performed    Nutrition Assessment:    Improving nutrition intakes and weight gains. C/o early satiety at meals, but also consuming 4 oz Ensure at onset of meal, which may prematurely provide feelings of fullness. Eating slowly, using chin tuck. Encouraged to take small sips of fluids after bites to aid with bolus transit and to avoid excess volume during meals (can use fluids at end of meals). Glucose elevated on steroid. Has BUE, BLE edema (1-2+), influencing weight.  present for discussion and visit. Nutrition Related Findings:    1-2+ BUE, BLE edema, + bm. Wound Type: None       Current Nutrition Intake & Therapies:    Average Meal Intake: %  Average Supplements Intake: %  ADULT ORAL NUTRITION SUPPLEMENT; Breakfast, Lunch, Dinner; Standard High Calorie/High Protein Oral Supplement  ADULT DIET; Regular; No Carbonated Beverages    Anthropometric Measures:  Height: 5' 5\" (165.1 cm)  Ideal Body Weight (IBW): 125 lbs (57 kg)    Admission Body Weight: 157 lb 10 oz (71.5 kg)  Current Body Weight: 161 lb (73 kg), 133.6 % IBW.  Weight Source: Bed Scale  Current BMI (kg/m2): 26.8  Usual Body Weight: 143 lb (64.9 kg)  % Weight Change (Calculated): 12.6  Weight Adjustment For: No Adjustment                 BMI Categories: Overweight (BMI 25.0-29. 9)    Estimated Daily Nutrient Needs:  Energy Requirements Based On: Kcal/kg  Weight Used for Energy Requirements: Current  Energy (kcal/day): 8654-3190 (20-23/kg)  Weight Used for Protein Requirements: Ideal  Protein (g/day): 74-91g (1.3-1.6g/kg)  Method Used for Fluid Requirements: ml/Kg  Fluid (ml/day): 1734 ml (23/kg)    Nutrition Diagnosis:   Predicted inadequate energy intake related to impaired respiratory function as evidenced by intake 51-75%    Lab Results   Component Value Date     10/16/2022    K 4.1 10/16/2022     10/16/2022    CO2 34 (H) 10/16/2022    BUN 17 10/16/2022    CREATININE 0.48 (L) 10/16/2022    GLUCOSE 160 (H) 10/16/2022    CALCIUM 10.1 10/16/2022    PROT 7.1 10/16/2022    LABALBU 4.1 10/16/2022    BILITOT <0.1 (L) 10/16/2022    ALKPHOS 80 10/16/2022    AST 23 10/16/2022    ALT 27 10/16/2022    LABGLOM >60 10/16/2022    GFRAA >60 09/09/2022    GLOB NOT REPORTED 06/10/2019     Nutrition Interventions:   Food and/or Nutrient Delivery: Continue Current Diet, Continue Oral Nutrition Supplement  Nutrition Education/Counseling: Education initiated  Coordination of Nutrition Care: Continue to monitor while inpatient  Plan of Care discussed with: Patient and her     Goals:  Previous Goal Met: Progressing toward Goal(s)  Goals: PO intake 75% or greater       Nutrition Monitoring and Evaluation:   Behavioral-Environmental Outcomes: Knowledge or Skill  Food/Nutrient Intake Outcomes: Food and Nutrient Intake, Supplement Intake  Physical Signs/Symptoms Outcomes: Biochemical Data, Weight, Fluid Status or Edema    Discharge Planning:    Continue current diet, Continue Oral Nutrition Supplement     Sarah Daniels RD, LD  Contact: [phone]

## 2022-10-17 NOTE — CONSULTS
Pulmonary/Critical Care consultation    Patient's name:  Nancy Hill  Medical Record Number: [id number]  Patient's account/billing number: [de-identified]  Patient's YOB: 1945  Age: 69 y.o. Date of Admission: 10/8/2022 10:06 AM  Date of Consult: 10/17/2022      Primary Care Physician: Dheeraj Lezama MD      Code Status: Full Code    Reason for consult: Pneumonia      HISTORY OF PRESENT ILLNESS:   History was obtained from chart review and the patient. Nancy Hill is a 69 y.o. white woman with known history of COPD was admitted with shortness of breath on 10/8/2022 that was associated with cough with yellow-colored sputum but no fever.   Patient has a good appetite  She also had decreased responsiveness upon admission but now she is mentating well  Patient was using CPAP upon arrival to the emergency room but when evaluated by me today she said that she does not feel comfortable using CPAP and she feels like she does not need it currently  She was on nasal cannula oxygen  She was also hypotensive upon admission but now her blood pressure is good and she is not requiring any pressors  Patient also has history of biventricular failure and bronchiectasis in the past along with the COPD  Patient with patchy interstitial fibrosis with traction bronchiectasis  Overall patient feels improving since admission to the hospital  Sputum done today without any significant neutrophilia but showed moderate gram-negative rods  Blood cultures have been negative upon admission  Respiratory viral panel also was negative upon admission and a separate COVID test was also negative  Patient was seen by me in June as an outpatient in follow-up for aspiration pneumonia with bronchiectasis and history of COVID-19 pneumonia  Pulmonary function tests were ordered but patient has not had them done yet          Past Medical History:        Diagnosis Date    A-fib ([facility] [address] Biventricular congestive heart failure (HCC)     Bronchiectasis with acute exacerbation ([address] 04/29/2022    CAD (coronary artery disease)     Chronic pain syndrome     dilaudid infusion pump    COPD (chronic obstructive pulmonary disease) (HCC)     Depression     GERD (gastroesophageal reflux disease)     Hypothyroidism     Impaired fasting glucose     Iron deficiency anemia     Osteoporosis     Pulmonary fibrosis ([address] 04/29/2022    Subdural hematoma ([address] 08/23/2022       Past Surgical History:        Procedure Laterality Date    BACK SURGERY  09/09/1998    spinal cord stimulator    BACK SURGERY  08/04/1999    infusion pump insertion    BACK SURGERY  10/23/2003    spinal cord stimulator removed    BACK SURGERY  10/23/2003    new infusion pump placed    BACK SURGERY  09/11/2017    replaced infusion pump    CARPAL TUNNEL RELEASE Right 12/17/1999    CARPAL TUNNEL RELEASE Left 11/24/1999    CARPAL TUNNEL RELEASE Right 12/30/2002    CARPAL TUNNEL RELEASE Left 12/17/2003    CERVICAL [address] SURGERY  10/25/2004    anterior cervical diskectomy C7-T1    CERVICAL DISC SURGERY  12/22/2004    anterior cervical discectomy Y9-8 (complicated by damaged nerve to vocal cord)    CRANIOTOMY Left 8/23/2022    LEFT CRANIOTOMY FOR SUBDURAL HEMATOMA EVACUATION performed by Yin Rodriguez DO at [facility] Left 12/20/2018    ORIF wrist    HUMERUS FRACTURE SURGERY Right 10/03/2010    HYSTERECTOMY (CERVIX STATUS UNKNOWN)  08/20/1991    KNEE ARTHROSCOPY Right 06/02/2011    KNEE SURGERY Right 02/04/2016    repair distal portion of knee arthroplasty due to prosthesis loosening    LUMBAR DISC SURGERY  02/15/1994    due to scar tissue from previous surgery    LUMBAR DISCECTOMY  08/30/1993    L5-S1    LUMBAR LAMINECTOMY  06/09/2008    L3-4-5    NECK SURGERY  05/03/2002    posterior plate fusion    NECK SURGERY  12/09/2005    reconnect nerve to vocal cord [facility]    TOE AMPUTATION  10/08/2006    left 3rd toe - osteomyelitis    TOE AMPUTATION  03/07/2008    left 4th toe partial amputation    TOE AMPUTATION  10/03/2008    left 2nd toe    TOTAL KNEE ARTHROPLASTY Right 03/19/2021    WRIST FRACTURE SURGERY Left 12/20/2018    WRIST OPEN REDUCTION INTERNAL FIXATION-DISTAL RADIUS performed by Kellie Sheriff MD at [facility] Left 12/20/2018    WRIST SURGERY Left 07/02/2019    left wrist ulnar shortening osteotomy       Allergies:    No Known Allergies      Home Meds:   Prior to Admission medications    Medication Sig Start Date End Date Taking? Authorizing Provider   HYDROcodone-acetaminophen (NORCO)  MG per tablet Take 1 tablet by mouth every 8 hours as needed for Pain. Yes Historical Provider, MD   pregabalin (LYRICA) 300 MG capsule Take 300 mg by mouth 2 times daily.     Historical Provider, MD   ondansetron (ZOFRAN-ODT) 4 MG disintegrating tablet Take 4 mg by mouth every 8 hours as needed for Nausea or Vomiting    Historical Provider, MD   amLODIPine (NORVASC) 10 MG tablet Take 1 tablet by mouth daily 8/31/22   RYAN Pederson CNP   benzocaine-menthol (CEPACOL SORE THROAT) 15-3.6 MG lozenge Take 1 lozenge by mouth every 2 hours as needed for Sore Throat  Patient not taking: No sig reported 8/30/22   RYAN Pederson CNP   levETIRAcetam (KEPPRA) 100 MG/ML solution Take 10 mLs by mouth 2 times daily 8/30/22   RYAN Pederson CNP   ibuprofen (ADVIL;MOTRIN) 400 MG tablet Take 1 tablet by mouth every 6 hours as needed for Pain 8/30/22   RYAN Pederson CNP   DULoxetine (CYMBALTA) 60 MG extended release capsule Take 60 mg by mouth daily    Historical Provider, MD   traZODone (DESYREL) 100 MG tablet Take 200 mg by mouth nightly    Historical Provider, MD   hydroxychloroquine (PLAQUENIL) 200 MG tablet Take 300 mg by mouth daily    Historical Provider, MD   calcium citrate-vitamin D (CITRICAL + D) 315-250 MG-UNIT TABS per tablet Take 1 tablet by mouth 2 times daily (with meals) Historical Provider, MD   potassium chloride (KLOR-CON) 20 MEQ packet Take 20 mEq by mouth 2 times daily    Historical Provider, MD   alendronate (FOSAMAX) 70 MG tablet Take 1 tablet by mouth every 7 days On Sundays 8/15/22 11/13/22  Lindsay Mcclain MD   pantoprazole sodium (PROTONIX) 40 MG PACK packet Take 1 packet by mouth in the morning and 1 packet in the evening. Take before meals. 8/15/22   Lindsay Mcclain MD   levothyroxine (SYNTHROID) 200 MCG tablet Take 1 tablet by mouth Daily 6/27/22   Lindsay Mcclain MD   XARELTO 20 MG TABS tablet TAKE 1 TABLET BY MOUTH  DAILY WITH BREAKFAST 4/8/22 8/30/22  Adilene Collins MD   ipratropium-albuterol (DUONEB) 0.5-2.5 (3) MG/3ML SOLN nebulizer solution 3 mLs 11/24/21   Historical Provider, MD   umeclidinium-vilanterol (ANORO ELLIPTA) 62.5-25 MCG/INH AEPB inhaler Inhale 1 puff into the lungs daily 3/16/22   Maria Earing, APRN - CNP   albuterol sulfate  (90 Base) MCG/ACT inhaler Inhale 2 puffs into the lungs 4 times daily 3/16/22   Maria AdrienneingRYAN - CNP   furosemide (LASIX) 20 MG tablet Take 1 tablet by mouth daily  Patient taking differently: Take 20 mg by mouth daily prn 5/14/21   Adilene Collins MD   pregabalin (LYRICA) 300 MG capsule Take 1 capsule by mouth 2 times daily for 30 days. 11/6/20 8/22/22  Purnima Nurse, MD   aspirin 81 MG tablet Take 81 mg by mouth daily  8/29/22  Historical Provider, MD       Family History:       Problem Relation Age of Onset    Heart Attack Father 61    Heart Attack Sister     Heart Disease Brother          Social History:   TOBACCO:   reports that she quit smoking about 45 years ago. Her smoking use included cigarettes. She has a 10.00 pack-year smoking history. She has never used smokeless tobacco.  ETOH:   reports no history of alcohol use. DRUGS:  reports no history of drug use. OCCUPATION:   There  is not history of TB or TB exposure. There is not asbestos or silica dust exposure.   The patient reports does not have coal, foundry, [address] or [employer] exposure. Travel history reveals nothing of significance. There is not  history of recreational or IV drug use. There is not hot tube exposure. The patient does not have pets, dogs, cats turtles or exotic birds. REVIEW OF SYSTEMS:    Review of Systems -   General ROS: Completed and except as mentioned above were negative   Psychological ROS:  Completed and except as mentioned above were negative  Ophthalmic ROS:  Completed and except as mentioned above were negative  ENT ROS:  Completed and except as mentioned above were negative  Allergy and Immunology ROS:  Completed and except as mentioned above were negative  Hematological and Lymphatic ROS:  Completed and except as mentioned above were negative  Endocrine ROS: Completed and except as mentioned above were negative  Breast ROS:  Completed and except as mentioned above were negative  Respiratory ROS:  Completed and except as mentioned above were negative  Cardiovascular ROS:  Completed and except as mentioned above were negative  Gastrointestinal ROS: Completed and except as mentioned above were negative  Genito-Urinary ROS:  Completed and except as mentioned above were negative  Musculoskeletal ROS:  Completed and except as mentioned above were negative  Neurological ROS:  Completed and except as mentioned above were negative  Dermatological ROS:  Completed and except as mentioned above were negative          Physical Exam:    Vitals: BP (!) 133/0   Pulse 88   Temp 98.1 °F (36.7 °C) (Temporal)   Resp 20   Ht 5' 5\" (1.651 m)   Wt 161 lb (73 kg)   SpO2 98%   BMI 26.79 kg/m²     Last Body weight:   Wt Readings from Last 3 Encounters:   10/17/22 161 lb (73 kg)   09/14/22 143 lb (64.9 kg)   09/07/22 143 lb (64.9 kg)       Body Mass Index : Body mass index is 26.79 kg/m².       Intake and Output summary:   Intake/Output Summary (Last 24 hours) at 10/17/2022 1043  Last data filed at 10/17/2022 0540  Gross per 24 hour   Intake 1160 ml   Output 1900 ml   Net -740 ml       Physical Examination:   PHYSICAL EXAMINATION:  Vitals:    10/17/22 0525 10/17/22 0530 10/17/22 0708 10/17/22 1019   BP:   (!) 133/0    Pulse:   88 88   Resp:   18 20   Temp:   98.1 °F (36.7 °C)    TempSrc:   Temporal    SpO2: 92%  96% 98%   Weight:  161 lb (73 kg)     Height:         Constitutional: This is a well developed, well nourished, 25-29.9 - Overweight 69y.o. year old female who is alert, oriented, cooperative and in no apparent distress. Head:normocephalic and atraumatic. EENT:   JP. No conjunctival injections. Septum was midline, mucosa was without erythema, exudates or cobblestoning. No thrush was noted. Mallampati II (soft palate, uvula, fauces visible)  Neck: Supple without thyromegaly. No elevated JVP. Trachea was midline. Respiratory: Chest was symmetrical without dullness to percussion. Breath sounds bilaterally were clear to auscultation. There were bilateral expiratory rhonchi but no rales. There is no intercostal retraction or use of accessory muscles. No egophony noted. Cardiovascular: Regular without murmur, clicks, gallops or rubs. Abdomen: Slightly rounded and soft without organomegaly. No rebound tenderness, rigidity or guarding was appreciated. Lymphatic: No lymphadenopathy. Musculoskeletal: Normal curvature of the spine. No gross muscle weakness. Extremities:  No lower extremity edema, ulcerations, tenderness, varicosities or erythema. Muscle size, tone and strength are normal.  No involuntary movements are noted. Skin:  Warm and dry. Good color, turgor and pigmentation. No lesions or scars.   No cyanosis or clubbing  Neurological/Psychiatric: The patient's general behavior, level of consciousness, thought content and emotional status is normal.            Laboratory findings:-    CBC:   Recent Labs     10/16/22  0515   WBC 5.1   HGB 11.8*        BMP:    Recent Labs     10/15/22  4392 10/16/22  0515    144   K 3.9 4.1   CL 98 103   CO2 32* 34*   BUN 14 17   CREATININE 0.51 0.48*   GLUCOSE 168* 160*     S. Calcium:  Recent Labs     10/16/22  0515   CALCIUM 10.1     S. Ionized Calcium:No results for input(s): IONCA in the last 72 hours. S. Magnesium:No results for input(s): MG in the last 72 hours. S. Phosphorus:No results for input(s): PHOS in the last 72 hours. S. Glucose:No results for input(s): POCGLU in the last 72 hours. Glycosylated hemoglobin A1C: No results for input(s): LABA1C in the last 72 hours. INR: No results for input(s): INR in the last 72 hours. Hepatic functions:   Recent Labs     10/16/22  0515   ALKPHOS 80   ALT 27   AST 23   PROT 7.1   BILITOT <0.1*   LABALBU 4.1     Pancreatic functions:No results for input(s): LACTA, AMYLASE in the last 72 hours. S. Lactic Acid: No results for input(s): LACTA in the last 72 hours. Cardiac enzymes:No results for input(s): CKTOTAL, CKMB, CKMBINDEX, TROPONINI in the last 72 hours. BNP:No results for input(s): BNP in the last 72 hours. Lipid profile: No results for input(s): CHOL, TRIG, HDL, LDL, LDLCALC in the last 72 hours.   Blood Gases: No results found for: PH, PCO2, PO2, HCO3, O2SAT  Thyroid functions:   Lab Results   Component Value Date/Time    TSH 11.11 06/20/2022 10:01 AM            Microbiology:    Cultures during this admission:     Blood cultures:      [] None drawn      [x] Negative             []  Positive (Details:  )  Urine Culture:        [] None drawn      [] Negative             []  Positive (Details:  )  Sputum Culture:   [] None drawn       [x] Negative             []  Positive (Details:  )       Radiological reports:    XR CHEST (2 VW)    Result Date: 10/14/2022  Nonspecific reticulonodular airspace opacities could reflect pulmonary edema, pneumonitis/pneumonia including viral/atypical etiology or combination there of.     XR CHEST PORTABLE    Result Date: 10/8/2022  Suspected bilateral infectious/inflammatory airways process. Assessment and Plan       IMPRESSION:   1. Pneumonia due to infectious organism, unspecified laterality, unspecified part of lung    2. CO2 retention    3. Confusion        Principal Problem:    Sepsis due to pneumonia [facility])  Active Problems:    Pulmonary fibrosis (HCC)    COPD (chronic obstructive pulmonary disease) ([facility] [address]  Resolved Problems:    * No resolved hospital problems. *  Patient may have had an infectious process such as pneumonitis or bronchiolitis that may have led to COPD exacerbation or bronchiectasis exacerbation but since has resolved and currently patient has been off antibiotics with improvement in symptomatology but continues to have expiratory rhonchi secondary to COPD exacerbation. Acute on chronic hypoxemic respiratory failure secondary to above problems      History of cigarette smoking    PLAN:       Continue corticosteroids and bronchodilators  Can use azithromycin  Patient was recommended to have prednisone and an antibiotic available for use during an exacerbation  Recommend flu vaccination in the fall annually. Recommendations given regarding pneumococcal vaccinations. Recommend COVID-19 vaccination  Maintain an active lifestyle. continue smoking cessation. Recommend pulmonary rehabilitation. Patient was educated on how to use the respiratory medications. All the questions that the patient has had were answered to   her satisfaction. Home O2 evaluation was done. Supplemental oxygen was  to be continued  Patient was informed of the need for using oxygen. Patient was educated on the importance of compliance and the benefits of oxygen use. Complications of oxygen use, including dryness of the nostrils, epistaxis and also the fire hazard were explained to the patient. Patient willingly accepts to use  the oxygen as recommended. Reviewed the chest x-ray  Discussed with respiratory therapist and Ms. Qureshi  Thank you for having us involved in the care of your patient. Please call us if you have any questions or concerns.       Aster Connolly MD, M.D.            10/17/2022, 10:45 AM

## 2022-10-17 NOTE — PROGRESS NOTES
Writer at bedside for reassessment. Patient resting in chair, respirations are even and unlabored while on 2L. Vitals obtained and assessment completed, see flowsheet for details. pt denies further needs at this time. Call light in reach, chair alarm on, will continue to monitor.

## 2022-10-17 NOTE — PROGRESS NOTES
Attempted to walk pt in the halls, pt took 3 steps from  chair and was extremely unsteady. Pt was very shaky. 2 nurses were there at that time due to the pt being very unsteady. Talked with PT about the pt and they stated they were using 2 people to stand and pt was very unstable then. Will continue to monitor.

## 2022-10-17 NOTE — PROGRESS NOTES
Occupational Therapy  Facility/Department: [facility] AT THE [facility] MED SURG  Daily Treatment Note  NAME: Dianna Phoenix  : 1945  MRN: [id number]    Date of Service: 10/17/2022    Discharge Recommendations:  Continue to assess pending progress, Subacute/Skilled Nursing Facility, Home with Home health OT         Patient Diagnosis(es): The primary encounter diagnosis was Pneumonia due to infectious organism, unspecified laterality, unspecified part of lung. Diagnoses of CO2 retention and Confusion were also pertinent to this visit. Assessment    Activity Tolerance: Patient tolerated treatment well  Discharge Recommendations: Continue to assess pending progress;Subacute/Skilled Nursing Facility;Home with Home health OT      Plan   Occupational Therapy Plan  Times Per Week: 7  Times Per Day: Once a day  Current Treatment Recommendations: Strengthening; Functional mobility training;Self-Care / ADL; Safety education & training; Endurance training     Restrictions  Restrictions/Precautions  Restrictions/Precautions: General Precautions    Subjective   Subjective  Subjective: Pt sitting up in bedside chair upon arrival with  present. Pt agreed to participate in therapy session. Pt declined ADL session this date. Pain: Pt had no complaints of pain this date. Orientation  Overall Orientation Status: Within Functional Limits  Pain: constant pain in LB 8/10           Objective    Vitals     Bed Mobility Training  Bed Mobility Training: No  Transfer Training  Transfer Training: Yes  Overall Level of Assistance: Moderate assistance;Maximum assistance  Interventions: Safety awareness training; Tactile cues; Verbal cues  Sit to Stand: Moderate assistance;Maximum assistance  Stand to Sit: Moderate assistance;Maximum assistance  Stand Pivot Transfers: Moderate assistance;Maximum assistance  Toilet Transfer:  Moderate assistance;Maximum assistance (Pt very unsteady with increased \"jerkiness\" noted for transfer from bedside chair to [facility] with RW.)  Gait Training  Gait Training: No     ADL  Toileting: Moderate assistance  Toileting Skilled Clinical Factors: Mod A to complete posterior hygiene and clothing mgmt. OT Exercises  Exercise Treatment: Pt tolerated BUE ther ex with 1# dumbbell x 7 planes x 15 reps x 1 set to increase UE strength and endurance in order to ease completion of ADL tasks. Pt required RBs as needed secondary to fatigue. Safety Devices  Type of Devices: Call light within reach; Left in chair;Chair alarm in place     Patient Education  Education Given To: Patient  Education Provided: Role of Therapy;Plan of Care;Energy Conservation;Transfer Training  Education Method: Demonstration  Barriers to Learning: None  Education Outcome: Verbalized understanding;Continued education needed    Goals  Short Term Goals  Time Frame for Short Term Goals: 20 visits  Short Term Goal 1: Patient to tolerate 15 minutes ther-ex/ther-act to increase ease of ADL participation. Short Term Goal 2: Patient to complete standing sinkside ADLs with SBA. Short Term Goal 3: Patient to complete LB bathing/dressing with SBA. Short Term Goal 4: Pt will be educated on EC strategies and breathing techniques to decrease fatigue and improve participation in ADL's.        Therapy Time   Individual Concurrent Group Co-treatment   Time In 0927         Time Out 0958         Minutes 31                 EB Mcdonough/BOB

## 2022-10-17 NOTE — PROGRESS NOTES
Physical Therapy  Facility/Department: [facility] AT THE [facility] MED SURG  Daily Treatment Note  NAME: Dianna Phoenix  : 1945  MRN: [id number]  Date of Service: 10/17/2022  Discharge Recommendations:  Continue to assess pending progress, Subacute/Skilled Nursing Facility   Patient Diagnosis(es): The primary encounter diagnosis was Pneumonia due to infectious organism, unspecified laterality, unspecified part of lung. Diagnoses of CO2 retention and Confusion were also pertinent to this visit. Assessment   Assessment: No ambulation at this time d/t pt. unsteady with increased invoulatary muscle movement and \"jerkiness\" with standing with [employer]. Mod/Max x1 for STS from chair and Min/Mod A to maintain standig with use of [employer]. Reclined and seated exercises B LE x15 with AAROM as needed. Pt. fatigues quickly. Activity Tolerance: Patient limited by fatigue;Patient limited by endurance; Patient limited by pain   Plan    Physcial Therapy Plan  General Plan: 2 times a day 7 days a week  Current Treatment Recommendations: Strengthening;Balance training;Functional mobility training;Transfer training;ADL/Self-care training;Neuromuscular re-education;Gait training; Safety education & training;Patient/Caregiver education & training   Restrictions  Restrictions/Precautions  Restrictions/Precautions: General Precautions  Required Braces or Orthoses?: No   Subjective    Subjective  Subjective: Pt. up in chiar upon arrival, aeeable to work with therapy at this time. Stated at times it is hard for her to breathe. Pain: constant pain in LB 8/10  Orientation  Overall Orientation Status: Within Functional Limits   Objective   Bed Mobility Training  Bed Mobility Training: No  Transfer Training  Transfer Training: Yes  Overall Level of Assistance: Moderate assistance;Maximum assistance  Interventions: Safety awareness training; Tactile cues; Verbal cues  Sit to Stand:  Moderate assistance;Maximum assistance  Stand to Sit: Moderate assistance;Maximum assistance  Gait Training  Gait Training: No  PT Exercises  Exercise Treatment: Reclined and seated exercises with AAROM as needed. Noted pt. falling asleep and easily fatigued with exercises. Reporrs pain and stiffness in LEs with exercises. Safety Devices  Type of Devices: Call light within reach; Left in chair;Chair alarm in place   Goals  Short Term Goals  Time Frame for Short Term Goals: 3 DAYS. Short Term Goal 1: CGA GAIT 50 FT FWW  Short Term Goal 2: SBA TRANSFERS AND SBA BED MOBILITY. Long Term Goals  Time Frame for Long Term Goals : 4 WEEKS  Long Term Goal 1: IND GAIT  FT,  Long Term Goal 2: IND TRANSFERS  Patient Goals   Patient Goals : RETURN HOME WITH ASSIST FROM . Education  Patient Education  Education Given To: Patient  Education Provided: Role of Therapy  Education Provided Comments: Hand placement and safety for all transfers. Slow positional changes and energy conservation techniques.   Education Method: Verbal;Demonstration  Barriers to Learning: None  Education Outcome: Verbalized understanding;Continued education needed  Therapy Time   Individual Concurrent Group Co-treatment   Time In 0715         Time Out 0525         Minutes [address] LOAIZA, PTA

## 2022-10-17 NOTE — PROGRESS NOTES
[facility]   APRN - Progress Note    Patient - Paul Whitehead  Date of Admission -  10/8/2022 10:06 AM  Date of Evaluation -  10/17/2022  Hospital Day - 9      SUBJECTIVE:     The Paul Whitehead is a 69 y.o. female sitting up in chair alert and no distress. Coughing but unable to expectorate . ROS:   Constitutional: negative  for fevers, and negative for chills. Respiratory: positive for shortness of breath, positive for cough, and negative for wheezing  Cardiovascular: negative for chest pain, and negative for palpitations  Gastrointestinal: negative for abdominal pain, negative for nausea,negative for vomiting, negative for diarrhea, and positive for constipation    All other systems were reviewed with the patient and are negative unless otherwise stated in HPI. OBJECTIVE:     VITAL SIGNS:  Patient Vitals for the past 8 hrs:   BP Temp Temp src Pulse Resp SpO2 Weight   10/17/22 0708 (!) 133/0 98.1 °F (36.7 °C) Temporal 88 18 96 % --   10/17/22 0530 -- -- -- -- -- -- 161 lb (73 kg)   10/17/22 0525 -- -- -- -- -- 92 % --         Temp: 98.1 °F (36.7 °C)  Temp range:    Temp  Av.2 °F (36.8 °C)  Min: 97.6 °F (36.4 °C)  Max: 98.7 °F (37.1 °C)    BP: (!) 133/0  BP Range:      Systolic (18JGZ), GIY:219 , Min:127 , GNX:680      Diastolic (70VJK), BQF:35, Min:0, Max:87    Heart Rate: 88  Pulse Range:    Pulse  Av.6  Min: 77  Max: 100    Resp: 18  Resp Range:   Resp  Av.2  Min: 18  Max: 24    SpO2: 96 % on supplemental O2  SpO2 range:   SpO2  Av.2 %  Min: 90 %  Max: 96 %    Weight  Wt Readings from Last 3 Encounters:   10/17/22 161 lb (73 kg)   22 143 lb (64.9 kg)   22 143 lb (64.9 kg)     Body mass index is 26.79 kg/m².     24HR INTAKE/OUTPUT:      Intake/Output Summary (Last 24 hours) at 10/17/2022 0959  Last data filed at 10/17/2022 0531  Gross per 24 hour   Intake 1160 ml   Output 1900 ml   Net -740 ml     Date 10/17/22 0000 - 10/17/22 2359   Shift 6745-1905 5915-0496 5045-5671 24 Hour Total   INTAKE   P.O. 100   100   Shift Total(mL/kg) 100(1.4)   100(1.4)   OUTPUT   Urine(mL/kg/hr) 1500(2.6)   1500   Shift Total(mL/kg) 1500(20.5)   1500(20.5)   Weight (kg) 73 73 73 73         PHYSICAL EXAM:  GEN:    Awake and following commands:     [] No   [x] Yes  MENTAL STATUS: alert and oriented x3. DISTRESS: Acute respiratory distress:       [x] No   [] Yes  EYES:  EOMI, pupils equal   NECK: Supple. No lymphadenopathy. No carotid bruit  CVS:    regular but tachycardic, no audible murmur  PULM: rhonchi and wheeze throughout no acute respiratory distress  ABD:    Bowels sounds normal.  Abdomen is soft. No distention. no tenderness to palpation. EXT:   trace edema bilaterally . No calf tenderness. NEURO: Moves all extremities. Motor and sensory are grossly intact  SKIN:  No rashes. No skin lesions.           MEDICATIONS:  Scheduled Meds:   methylPREDNISolone  60 mg IntraVENous Q8H    bisacodyl  10 mg Oral Once    magnesium hydroxide  30 mL Oral Once    amLODIPine  10 mg Oral Daily    DULoxetine  60 mg Oral Daily    hydroxychloroquine  300 mg Oral Daily    levETIRAcetam  1,000 mg Oral BID    levothyroxine  200 mcg Oral Daily    pantoprazole  40 mg Oral QAM AC    potassium bicarb-citric acid  20 mEq Oral BID    pregabalin  300 mg Oral BID    sertraline  50 mg Oral Daily    traZODone  200 mg Oral Nightly    tiotropium-olodaterol  2 puff Inhalation Daily    sodium chloride flush  5-40 mL IntraVENous 2 times per day    famotidine  20 mg Oral BID    enoxaparin  40 mg SubCUTAneous Daily    ipratropium-albuterol  3 mL Inhalation 4x daily     Continuous Infusions:   sodium chloride Stopped (10/11/22 0643)     PRN Meds:   HYDROcodone-acetaminophen, 1 tablet, Q4H PRN  polyethylene glycol, 17 g, Daily PRN  guaiFENesin-dextromethorphan, 5 mL, Q4H PRN  Benzocaine-Menthol, 1 lozenge, Q2H PRN  ondansetron, 4 mg, Q8H PRN  sodium chloride flush, 5-40 mL, PRN  sodium chloride, 25 mL, PRN  acetaminophen, 650 mg, Q6H PRN   Or  acetaminophen, 650 mg, Q6H PRN  albuterol, 2.5 mg, Q4H PRN      DATA:  Complete Blood Count:   Recent Labs     10/15/22  0728 10/16/22  0515   WBC 6.0 5.1   RBC 4.03 3.93*   HGB 12.2 11.8*   HCT 37.3 37.1   MCV 92.6 94.4   RDW 13.2 13.2    234   SEGS 90* 90*   NEUTROABS 5.40 4.59   LYMPHOPCT 9* 7*   LYMPHSABS 0.54* 0.36*   MONOPCT 1* 3   EOSRELPCT 0* 0*   BASOPCT 0 0   IMMGRAN 0 0        Recent Blood Glucose:   Recent Labs     10/15/22  0728 10/16/22  0515   GLUCOSE 168* 160*        Comprehensive Metabolic Profile:   Recent Labs     10/15/22  0728 10/16/22  0515   BUN 14 17   CREATININE 0.51 0.48*    144   K 3.9 4.1   CL 98 103   CALCIUM 9.8 10.1   ANIONGAP 9 7*   CO2 32* 34*   PROT 7.2 7.1   LABALBU 4.2 4.1   BILITOT <0.1* <0.1*   ALKPHOS 88 80   AST 17 23   ALT 20 27        Urinalysis:   Lab Results   Component Value Date/Time    NITRU NEGATIVE 10/08/2022 11:24 AM    COLORU Yellow 10/08/2022 11:24 AM    PHUR 6.0 10/08/2022 11:24 AM    WBCUA 0 TO 2 09/09/2022 09:29 PM    RBCUA 0 TO 2 09/09/2022 09:29 PM    MUCUS NOT REPORTED 10/14/2021 08:07 PM    TRICHOMONAS NOT REPORTED 10/14/2021 08:07 PM    YEAST NOT REPORTED 10/14/2021 08:07 PM    BACTERIA 1+ 09/09/2022 09:29 PM    SPECGRAV 1.015 10/08/2022 11:24 AM    LEUKOCYTESUR NEGATIVE 10/08/2022 11:24 AM    UROBILINOGEN Normal 10/08/2022 11:24 AM    BILIRUBINUR NEGATIVE 10/08/2022 11:24 AM    GLUCOSEU NEGATIVE 10/08/2022 11:24 AM    KETUA NEGATIVE 10/08/2022 11:24 AM    AMORPHOUS 2+ 09/09/2022 09:29 PM       HgBA1c:    Lab Results   Component Value Date/Time    LABA1C 5.7 06/20/2022 10:00 AM       TSH:    Lab Results   Component Value Date/Time    TSH 11.11 06/20/2022 10:01 AM       Lactic Acid:   Lab Results   Component Value Date/Time    LACTA 1.9 04/28/2022 03:14 PM    LACTA 2.0 02/06/2022 10:51 AM    LACTA 2.5 02/05/2022 03:30 PM        High Sensitivity Troponin:  No results for input(s): TROPHS in the last 72 hours.    Pro-BNP:  Lab Results   Component Value Date    PROBNP 117 10/08/2022     D-Dimer:  Lab Results   Component Value Date    DDIMER 0.69 (H) 01/18/2022     PT/INR:    Lab Results   Component Value Date/Time    PROTIME 13.0 10/08/2022 11:01 AM    INR 1.0 10/08/2022 11:01 AM     PTT:    Lab Results   Component Value Date/Time    APTT 28.7 10/08/2022 11:01 AM       CRP: No results for input(s): CRP in the last 72 hours. ABGs:   Lab Results   Component Value Date/Time    PHART 7.400 10/14/2022 07:55 AM    RKP5VNG 51.4 10/14/2022 07:55 AM    PO2ART 99.0 10/14/2022 07:55 AM    CGF0YWT 31.1 10/14/2022 07:55 AM    XYS8FPV 35 11/02/2020 04:16 AM    I9JNZUGJ 97.4 10/14/2022 07:55 AM    FIO2 28 10/10/2022 05:30 AM         Radiology/Imaging:  XR CHEST (2 VW)   Final Result   Nonspecific reticulonodular airspace opacities could reflect pulmonary edema,   pneumonitis/pneumonia including viral/atypical etiology or combination there   of.         FL MODIFIED BARIUM SWALLOW W VIDEO   Final Result   Swallowing mechanism grossly within normal limits without evidence of   aspiration. 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 Consider barium esophagram follow-up if   indicated. Please see separate speech pathology report for full discussion of findings   and recommendations. RECOMMENDATIONS:   Unavailable         CT Head W/O Contrast   Final Result   No acute process. No significant change in primarily low-density left   convexity extra-axial collection. XR CHEST PORTABLE   Final Result   Suspected bilateral infectious/inflammatory airways process.                ASSESSMENT / PLAN:     Sepsis due to pneumonia ([address]  Continue current therapy  Completed IV Zosyn  Completed IV Levaquin  Stop IV fluids  Trend labs-improving  Remove Sheikh  Acute on chronic respiratory failure with hypoxia and hypercapnia  Appreciate Dr. Chito Young  Trend ABGs-improving  Wean oxygen  CPAP intemittant-refused  Mucomyst neb x 2 doses given  Start Chest Vest  COPD/pulmonary fibrosis  Continue Anoro Ellipta, duo nebs  Hypertension  Continue amlodipine  Chronic pain syndrome  Continue Norco  Nutrition status:   at risk for malnutrition  Dietician consult initiated  [] NPO [] TPN      [] Tube feed [] Clear liquid        [] Full liquid [x] regular diet         [] Fluid restriction   [] Diabetic diet   Prophylaxis:   DVT: Lovenox   Stress Ulcer: H2 Blocker   High risk medications: none   Disposition:    Discharge plan is [place]  She is approved by Insurance at this time        Star Phuong, APRN - CNP , RYAN-NP-C  10/17/2022  9:59 AM

## 2022-10-18 VITALS
HEART RATE: 84 BPM | BODY MASS INDEX: 26.71 KG/M2 | OXYGEN SATURATION: 97 % | HEIGHT: 65 IN | SYSTOLIC BLOOD PRESSURE: 124 MMHG | WEIGHT: 160.3 LBS | TEMPERATURE: 97.9 F | DIASTOLIC BLOOD PRESSURE: 66 MMHG | RESPIRATION RATE: 18 BRPM

## 2022-10-18 LAB
ABSOLUTE EOS #: <0.03 K/UL (ref 0–0.44)
ABSOLUTE IMMATURE GRANULOCYTE: 0.03 K/UL (ref 0–0.3)
ABSOLUTE LYMPH #: 0.57 K/UL (ref 1.1–3.7)
ABSOLUTE MONO #: 0.28 K/UL (ref 0.1–1.2)
ANION GAP SERPL CALCULATED.3IONS-SCNC: 9 MMOL/L (ref 9–17)
BASOPHILS # BLD: 0 % (ref 0–2)
BASOPHILS ABSOLUTE: <0.03 K/UL (ref 0–0.2)
BUN BLDV-MCNC: 20 MG/DL (ref 8–23)
BUN/CREAT BLD: 45 (ref 9–20)
CALCIUM SERPL-MCNC: 9.7 MG/DL (ref 8.6–10.4)
CHLORIDE BLD-SCNC: 99 MMOL/L (ref 98–107)
CO2: 33 MMOL/L (ref 20–31)
CREAT SERPL-MCNC: 0.44 MG/DL (ref 0.5–0.9)
EOSINOPHILS RELATIVE PERCENT: 0 % (ref 1–4)
GFR SERPL CREATININE-BSD FRML MDRD: >60 ML/MIN/1.73M2
GLUCOSE BLD-MCNC: 148 MG/DL (ref 70–99)
HCT VFR BLD CALC: 39.5 % (ref 36.3–47.1)
HEMOGLOBIN: 12.7 G/DL (ref 11.9–15.1)
IMMATURE GRANULOCYTES: 1 %
LYMPHOCYTES # BLD: 12 % (ref 24–43)
MCH RBC QN AUTO: 29.9 PG (ref 25.2–33.5)
MCHC RBC AUTO-ENTMCNC: 32.2 G/DL (ref 28.4–34.8)
MCV RBC AUTO: 92.9 FL (ref 82.6–102.9)
MONOCYTES # BLD: 6 % (ref 3–12)
NRBC AUTOMATED: 0 PER 100 WBC
PDW BLD-RTO: 13.1 % (ref 11.8–14.4)
PLATELET # BLD: 251 K/UL (ref 138–453)
PMV BLD AUTO: 9.9 FL (ref 8.1–13.5)
POTASSIUM SERPL-SCNC: 4 MMOL/L (ref 3.7–5.3)
RBC # BLD: 4.25 M/UL (ref 3.95–5.11)
SARS-COV-2, RAPID: NOT DETECTED
SEG NEUTROPHILS: 81 % (ref 36–65)
SEGMENTED NEUTROPHILS ABSOLUTE COUNT: 3.73 K/UL (ref 1.5–8.1)
SODIUM BLD-SCNC: 141 MMOL/L (ref 135–144)
SPECIMEN DESCRIPTION: NORMAL
WBC # BLD: 4.6 K/UL (ref 3.5–11.3)

## 2022-10-18 PROCEDURE — C9803 HOPD COVID-19 SPEC COLLECT: HCPCS

## 2022-10-18 PROCEDURE — 6370000000 HC RX 637 (ALT 250 FOR IP): Performed by: NURSE PRACTITIONER

## 2022-10-18 PROCEDURE — 97535 SELF CARE MNGMENT TRAINING: CPT

## 2022-10-18 PROCEDURE — 36415 COLL VENOUS BLD VENIPUNCTURE: CPT

## 2022-10-18 PROCEDURE — 94640 AIRWAY INHALATION TREATMENT: CPT

## 2022-10-18 PROCEDURE — 94761 N-INVAS EAR/PLS OXIMETRY MLT: CPT

## 2022-10-18 PROCEDURE — 85025 COMPLETE CBC W/AUTO DIFF WBC: CPT

## 2022-10-18 PROCEDURE — 2580000003 HC RX 258: Performed by: INTERNAL MEDICINE

## 2022-10-18 PROCEDURE — 87635 SARS-COV-2 COVID-19 AMP PRB: CPT

## 2022-10-18 PROCEDURE — 2700000000 HC OXYGEN THERAPY PER DAY

## 2022-10-18 PROCEDURE — 6370000000 HC RX 637 (ALT 250 FOR IP): Performed by: INTERNAL MEDICINE

## 2022-10-18 PROCEDURE — 97116 GAIT TRAINING THERAPY: CPT

## 2022-10-18 PROCEDURE — 6360000002 HC RX W HCPCS: Performed by: INTERNAL MEDICINE

## 2022-10-18 PROCEDURE — 80048 BASIC METABOLIC PNL TOTAL CA: CPT

## 2022-10-18 PROCEDURE — 97110 THERAPEUTIC EXERCISES: CPT

## 2022-10-18 PROCEDURE — 94669 MECHANICAL CHEST WALL OSCILL: CPT

## 2022-10-18 PROCEDURE — 97530 THERAPEUTIC ACTIVITIES: CPT

## 2022-10-18 RX ORDER — AZITHROMYCIN 250 MG/1
250 TABLET, FILM COATED ORAL DAILY
Qty: 4 TABLET | Refills: 0 | DISCHARGE
Start: 2022-10-19 | End: 2022-10-23

## 2022-10-18 RX ORDER — AZITHROMYCIN 250 MG/1
500 TABLET, FILM COATED ORAL DAILY
Status: COMPLETED | OUTPATIENT
Start: 2022-10-18 | End: 2022-10-18

## 2022-10-18 RX ORDER — HYDROCODONE BITARTRATE AND ACETAMINOPHEN 10; 325 MG/1; MG/1
1 TABLET ORAL EVERY 8 HOURS PRN
Qty: 9 TABLET | Refills: 0 | Status: SHIPPED | OUTPATIENT
Start: 2022-10-18 | End: 2022-10-21

## 2022-10-18 RX ORDER — AZITHROMYCIN 250 MG/1
250 TABLET, FILM COATED ORAL DAILY
Status: DISCONTINUED | OUTPATIENT
Start: 2022-10-19 | End: 2022-10-18 | Stop reason: HOSPADM

## 2022-10-18 RX ORDER — PREGABALIN 300 MG/1
300 CAPSULE ORAL 2 TIMES DAILY
Qty: 10 CAPSULE | Refills: 0 | Status: SHIPPED | OUTPATIENT
Start: 2022-10-18 | End: 2022-10-23

## 2022-10-18 RX ADMIN — METHYLPREDNISOLONE SODIUM SUCCINATE 60 MG: 125 INJECTION, POWDER, FOR SOLUTION INTRAMUSCULAR; INTRAVENOUS at 08:52

## 2022-10-18 RX ADMIN — AMLODIPINE BESYLATE 10 MG: 10 TABLET ORAL at 08:52

## 2022-10-18 RX ADMIN — POTASSIUM BICARBONATE 20 MEQ: 782 TABLET, EFFERVESCENT ORAL at 08:53

## 2022-10-18 RX ADMIN — PREGABALIN 300 MG: 75 CAPSULE ORAL at 08:53

## 2022-10-18 RX ADMIN — LEVOTHYROXINE SODIUM 200 MCG: 100 TABLET ORAL at 08:53

## 2022-10-18 RX ADMIN — FAMOTIDINE 20 MG: 20 TABLET ORAL at 08:53

## 2022-10-18 RX ADMIN — PANTOPRAZOLE SODIUM 40 MG: 40 TABLET, DELAYED RELEASE ORAL at 08:52

## 2022-10-18 RX ADMIN — TIOTROPIUM BROMIDE AND OLODATEROL 2 PUFF: 3.124; 2.736 SPRAY, METERED RESPIRATORY (INHALATION) at 10:15

## 2022-10-18 RX ADMIN — ENOXAPARIN SODIUM 40 MG: 100 INJECTION SUBCUTANEOUS at 08:54

## 2022-10-18 RX ADMIN — AZITHROMYCIN MONOHYDRATE 500 MG: 250 TABLET ORAL at 08:53

## 2022-10-18 RX ADMIN — SODIUM CHLORIDE, PRESERVATIVE FREE 10 ML: 5 INJECTION INTRAVENOUS at 09:01

## 2022-10-18 RX ADMIN — Medication 1000 MG: at 08:53

## 2022-10-18 RX ADMIN — HYDROXYCHLOROQUINE SULFATE 300 MG: 200 TABLET ORAL at 08:53

## 2022-10-18 RX ADMIN — IPRATROPIUM BROMIDE AND ALBUTEROL SULFATE 3 ML: 2.5; .5 SOLUTION RESPIRATORY (INHALATION) at 04:57

## 2022-10-18 RX ADMIN — IPRATROPIUM BROMIDE AND ALBUTEROL SULFATE 3 ML: 2.5; .5 SOLUTION RESPIRATORY (INHALATION) at 10:15

## 2022-10-18 RX ADMIN — SERTRALINE HYDROCHLORIDE 50 MG: 50 TABLET ORAL at 08:52

## 2022-10-18 RX ADMIN — DULOXETINE HYDROCHLORIDE 60 MG: 60 CAPSULE, DELAYED RELEASE ORAL at 08:52

## 2022-10-18 ASSESSMENT — PAIN DESCRIPTION - ONSET: ONSET: ON-GOING

## 2022-10-18 ASSESSMENT — PAIN DESCRIPTION - PAIN TYPE: TYPE: CHRONIC PAIN

## 2022-10-18 ASSESSMENT — PAIN - FUNCTIONAL ASSESSMENT: PAIN_FUNCTIONAL_ASSESSMENT: ACTIVITIES ARE NOT PREVENTED

## 2022-10-18 ASSESSMENT — PAIN DESCRIPTION - DESCRIPTORS: DESCRIPTORS: ACHING

## 2022-10-18 ASSESSMENT — PAIN DESCRIPTION - ORIENTATION: ORIENTATION: LOWER

## 2022-10-18 ASSESSMENT — PAIN SCALES - GENERAL: PAINLEVEL_OUTOF10: 7

## 2022-10-18 ASSESSMENT — PAIN DESCRIPTION - LOCATION: LOCATION: BACK

## 2022-10-18 ASSESSMENT — PAIN DESCRIPTION - FREQUENCY: FREQUENCY: INTERMITTENT

## 2022-10-18 NOTE — PROGRESS NOTES
Physical Therapy  Facility/Department: [facility] AT THE [facility] MED SURG  Daily Treatment Note  NAME: Christiano Wilson  : 1945  MRN: [id number]  Date of Service: 10/18/2022  Discharge Recommendations:  Continue to assess pending progress, Subacute/Skilled Nursing Facility   Patient Diagnosis(es): The primary encounter diagnosis was Pneumonia due to infectious organism, unspecified laterality, unspecified part of lung. Diagnoses of CO2 retention and Confusion were also pertinent to this visit. Assessment   Assessment: Pt. able to stand aprox. 2' with [employer], Mod/Max A and requried assistance to maintain [employer] static. Reclined nad seated exercises B LE x20. Transfers: Mod/Max A  Activity Tolerance: Patient tolerated treatment well;Patient limited by endurance; Patient limited by fatigue   Plan    Physcial Therapy Plan  General Plan: 2 times a day 7 days a week  Current Treatment Recommendations: Strengthening;Balance training;Functional mobility training;Transfer training;ADL/Self-care training;Neuromuscular re-education;Gait training; Safety education & training;Patient/Caregiver education & training   Restrictions  Restrictions/Precautions  Restrictions/Precautions: General Precautions  Required Braces or Orthoses?: No   Subjective    Subjective  Subjective: Pt. in chair upon arrival, agreeable to therapy at this time. [facility] Pain: constant pain in LB 8/10  Orientation  Overall Orientation Status: Within Functional Limits   Objective   Bed Mobility Training  Bed Mobility Training: No  Transfer Training  Transfer Training: Yes  Overall Level of Assistance: Moderate assistance;Maximum assistance  Interventions: Verbal cues  Sit to Stand: Moderate assistance;Maximum assistance  Stand to Sit: Moderate assistance;Maximum assistance  Stand Pivot Transfers: Moderate assistance;Assist X2  Toilet Transfer:  Moderate assistance;Assist X2  Gait Training  Gait Training: No  Gait  Overall Level of Assistance: Moderate assistance;Assist X2  Interventions: Verbal cues  Base of Support: Widened  Speed/Helen: Slow;Shuffled  Step Length: Right shortened;Left shortened  Gait Abnormalities: Antalgic  Distance (ft):  (5 steps x2)  Assistive Device: Walker, rolling  PT Exercises  Exercise Treatment: Reclined and seated exercises B LE x20  Static Standing Balance Exercises: Pt. able to stand for aprox 2' with [employer], Mod/Max A with assistance to maintain [employer] static. Pt. has increased invoulntary muscle movement in LEs increasing instbaility of pt and increasing fall risk  Other Specialty Interventions  Other Treatments/Modalities: BSC use and transfer  Safety Devices  Type of Devices: Call light within reach; Left in chair;Chair alarm in place;Nurse notified   Goals  Short Term Goals  Time Frame for Short Term Goals: 3 DAYS. Short Term Goal 1: CGA GAIT 50 FT FWW  Short Term Goal 2: SBA TRANSFERS AND SBA BED MOBILITY. Long Term Goals  Time Frame for Long Term Goals : 4 WEEKS  Long Term Goal 1: IND GAIT  FT,  Long Term Goal 2: IND TRANSFERS  Patient Goals   Patient Goals : RETURN HOME WITH ASSIST FROM . Education  Patient Education  Education Given To: Patient  Education Provided: Role of Therapy;Transfer Training; Fall Prevention Strategies  Education Provided Comments: Hand placement and safety for all transfers.  Proper use of [employer] with transfers for decreased fall risk  Education Method: Verbal;Demonstration  Barriers to Learning: None  Education Outcome: Verbalized understanding;Continued education needed  Therapy Time   Individual Concurrent Group Co-treatment   Time In 1115         Time Out 1143         Minutes [address] [place], [place]

## 2022-10-18 NOTE — PROGRESS NOTES
Physical Therapy  Facility/Department: [facility] AT THE [facility] MED SURG  Daily Treatment Note  NAME: Gael Ann  : 1945  MRN: [id number]  Date of Service: 10/18/2022  Discharge Recommendations:  Continue to assess pending progress, Subacute/Skilled Nursing Facility   Patient Diagnosis(es): The primary encounter diagnosis was Pneumonia due to infectious organism, unspecified laterality, unspecified part of lung. Diagnoses of CO2 retention and Confusion were also pertinent to this visit. Assessment   Assessment: Pt. able to take 5 steps x2 with [employer], Mod Ax2 with noted increased \"jerkiness\" and instability of LEs with poor control of [employer]. Robb Dakin: Mod A x2. Seated exercises B LE x20  Activity Tolerance: Patient tolerated treatment well;Patient limited by endurance; Patient limited by fatigue   Plan    Physcial Therapy Plan  General Plan: 2 times a day 7 days a week  Current Treatment Recommendations: Strengthening;Balance training;Functional mobility training;Transfer training;ADL/Self-care training;Neuromuscular re-education;Gait training; Safety education & training;Patient/Caregiver education & training   Restrictions  Restrictions/Precautions  Restrictions/Precautions: General Precautions  Required Braces or Orthoses?: No   Subjective    Subjective  Subjective: Pt. in chair upon arrival, agreeable to therapy at this itme. Pain: constant pain in LB 8/10  Orientation  Overall Orientation Status: Within Functional Limits   Objective   Bed Mobility Training  Bed Mobility Training: No  Transfer Training  Transfer Training: Yes  Overall Level of Assistance: Moderate assistance;Assist X2  Interventions: Safety awareness training; Tactile cues; Verbal cues  Sit to Stand: Moderate assistance;Assist X2  Stand to Sit: Moderate assistance;Assist X2  Stand Pivot Transfers: Moderate assistance;Assist X2  Toilet Transfer: Moderate assistance;Assist X2  Gait Training  Gait Training: Yes  Gait  Overall Level of Assistance:  Moderate assistance;Assist X2  Interventions: Verbal cues  Base of Support: Widened  Speed/Helen: Slow;Shuffled  Step Length: Right shortened;Left shortened  Gait Abnormalities: Antalgic  Distance (ft):  (5 steps x2)  Assistive Device: Walker, rolling  PT Exercises  Exercise Treatment: Seated exercises B Le x20  Other Specialty Interventions  Other Treatments/Modalities: BSC use and transfer  Safety Devices  Type of Devices: Call light within reach; Left in chair;Chair alarm in place;Nurse notified   Goals  Short Term Goals  Time Frame for Short Term Goals: 3 DAYS. Short Term Goal 1: CGA GAIT 50 FT FWW  Short Term Goal 2: SBA TRANSFERS AND SBA BED MOBILITY. Long Term Goals  Time Frame for Long Term Goals : 4 WEEKS  Long Term Goal 1: IND GAIT  FT,  Long Term Goal 2: IND TRANSFERS  Patient Goals   Patient Goals : RETURN HOME WITH ASSIST FROM . Education  Patient Education  Education Given To: Patient  Education Provided: Role of Therapy;Transfer Training; Fall Prevention Strategies  Education Provided Comments: Hand placement and safety for all transfers.  Proper use of [employer] with transfers for decreased fall risk  Education Method: Verbal;Demonstration  Barriers to Learning: None  Education Outcome: Verbalized understanding;Continued education needed  Therapy Time   Individual Concurrent Group Co-treatment   Time In 0828         Time Out 0858         Minutes [address] [place] [address]

## 2022-10-18 NOTE — PROGRESS NOTES
Occupational Therapy  Facility/Department: [facility] AT THE [facility] MED SURG  Daily Treatment Note  NAME: Tyrese Vargas  : 1945  MRN: [id number]    Date of Service: 10/18/2022    Discharge Recommendations:  Continue to assess pending progress, Subacute/Skilled Nursing Facility, Home with Home health OT         Patient Diagnosis(es): The primary encounter diagnosis was Pneumonia due to infectious organism, unspecified laterality, unspecified part of lung. Diagnoses of CO2 retention and Confusion were also pertinent to this visit. Assessment    Activity Tolerance: Patient tolerated treatment well  Discharge Recommendations: Continue to assess pending progress;Subacute/Skilled Nursing Facility;Home with Home health OT      Plan   Occupational Therapy Plan  Times Per Week: 7  Times Per Day: Once a day  Current Treatment Recommendations: Strengthening; Functional mobility training;Self-Care / ADL; Safety education & training; Endurance training     Restrictions  Restrictions/Precautions  Restrictions/Precautions: General Precautions    Subjective   Subjective  Subjective: Pt sitting up in bedside chair upon arrival with  present. Pt agreed to participate in therapy session. Pain: Pt had no complaints of pain this date. Orientation  Overall Orientation Status: Within Functional Limits  Pain: constant pain in LB 8/10           Objective    Vitals     Bed Mobility Training  Bed Mobility Training: No  Transfer Training  Transfer Training: Yes  Overall Level of Assistance: Moderate assistance;Maximum assistance  Interventions: Safety awareness training; Tactile cues; Verbal cues  Sit to Stand: Moderate assistance;Maximum assistance  Stand to Sit: Moderate assistance;Maximum assistance  Stand Pivot Transfers: Moderate assistance;Maximum assistance  Toilet Transfer: Moderate assistance;Maximum assistance  Gait Training  Gait Training: Yes  Gait  Overall Level of Assistance:  Moderate assistance  Interventions: Verbal cues  Base of Support: Widened  Speed/Helen: Slow;Shuffled  Step Length: Right shortened;Left shortened  Gait Abnormalities: Antalgic  Distance (ft):  (5 steps x2)  Assistive Device: Walker, rolling     ADL  Grooming: Supervision  Grooming Skilled Clinical Factors: Sup to complete washing face and hair with use of shower cap. LE Dressing: Contact guard assistance  Toileting: Moderate assistance  Toileting Skilled Clinical Factors: Mod A to complete posterior hygiene and clothing mgmt. Safety Devices  Type of Devices: Call light within reach; Left in chair;Chair alarm in place     Patient Education  Education Given To: Patient  Education Provided: Role of Therapy;Plan of Care;Energy Conservation;Transfer Training  Education Method: Demonstration  Barriers to Learning: None  Education Outcome: Verbalized understanding;Continued education needed    Goals  Short Term Goals  Time Frame for Short Term Goals: 20 visits  Short Term Goal 1: Patient to tolerate 15 minutes ther-ex/ther-act to increase ease of ADL participation. Short Term Goal 2: Patient to complete standing sinkside ADLs with SBA. Short Term Goal 3: Patient to complete LB bathing/dressing with SBA. Short Term Goal 4: Pt will be educated on EC strategies and breathing techniques to decrease fatigue and improve participation in ADL's.        Therapy Time   Individual Concurrent Group Co-treatment   Time In 0828         Time Out 0858         Minutes 30                 EB Mcdonough/BOB

## 2022-10-18 NOTE — PROGRESS NOTES
[facility] UN[facility]   APRN - Progress Note    Patient - Figueroa Le  Date of Admission -  10/8/2022 10:06 AM  Date of Evaluation -  10/18/2022  Hospital Day - 10      SUBJECTIVE:     The Figueroa Le is a 69 y.o. female sitting up in chair alert and no distress. Stated she feels like she did yesterday      ROS:   Constitutional: negative  for fevers, and negative for chills. Respiratory: positive for shortness of breath, positive for cough, and negative for wheezing  Cardiovascular: negative for chest pain, and negative for palpitations  Gastrointestinal: negative for abdominal pain, negative for nausea,negative for vomiting, negative for diarrhea, and positive for constipation    All other systems were reviewed with the patient and are negative unless otherwise stated in HPI. OBJECTIVE:     VITAL SIGNS:  Patient Vitals for the past 8 hrs:   BP Temp Temp src Pulse Resp SpO2 Weight   10/18/22 0643 124/66 97.9 °F (36.6 °C) Temporal 86 18 98 % --   10/18/22 0500 -- -- -- -- -- -- 160 lb 4.8 oz (72.7 kg)   10/18/22 0457 -- -- -- 70 18 97 % --   10/18/22 0200 (!) 155/88 98 °F (36.7 °C) Temporal 86 22 96 % --         Temp: 97.9 °F (36.6 °C)  Temp range:    Temp  Av.9 °F (36.6 °C)  Min: 97.9 °F (36.6 °C)  Max: 98 °F (36.7 °C)    BP: 124/66  BP Range:      Systolic (58WXA), MCB:695 , Min:124 , CQK:970      Diastolic (99UMG), CHINTAN:09, Min:64, Max:88    Heart Rate: 86  Pulse Range:    Pulse  Av.7  Min: 70  Max: 96    Resp: 18  Resp Range:   Resp  Av.3  Min: 18  Max: 22    SpO2: 98 % on supplemental O2  SpO2 range:   SpO2  Av.6 %  Min: 95 %  Max: 98 %    Weight  Wt Readings from Last 3 Encounters:   10/18/22 160 lb 4.8 oz (72.7 kg)   22 143 lb (64.9 kg)   22 143 lb (64.9 kg)     Body mass index is 26.68 kg/m².     24HR INTAKE/OUTPUT:      Intake/Output Summary (Last 24 hours) at 10/18/2022 0802  Last data filed at 10/18/2022 0511  Gross per 24 hour   Intake 1460 ml   Output 3100 ml   Net -1640 ml     Date 10/18/22 0000 - 10/18/22 2359   Shift 0830-9942 7280-0324 2243-9304 24 Hour Total   INTAKE   P.O. 100   100   Shift Total(mL/kg) 100(1.4)   100(1.4)   OUTPUT   Urine(mL/kg/hr) 1700(2.9)   1700   Shift Total(mL/kg) 1700(23.4)   1700(23.4)   Weight (kg) 72.7 72.7 72.7 72.7         PHYSICAL EXAM:  GEN:    Awake and following commands:     [] No   [x] Yes  MENTAL STATUS: alert and oriented x3. DISTRESS: Acute respiratory distress:       [x] No   [] Yes  EYES:  EOMI, pupils equal   NECK: Supple. No lymphadenopathy. No carotid bruit  CVS:    regular but tachycardic, no audible murmur  PULM: Clear, few scattered wheeze no acute respiratory distress  ABD:    Bowels sounds normal.  Abdomen is soft. No distention. no tenderness to palpation. EXT:   trace edema bilaterally . No calf tenderness. NEURO: Moves all extremities. Motor and sensory are grossly intact  SKIN:  No rashes. No skin lesions.           MEDICATIONS:  Scheduled Meds:   azithromycin  500 mg Oral Daily    Followed by    Regla Frazier ON 10/19/2022] azithromycin  250 mg Oral Daily    methylPREDNISolone  60 mg IntraVENous Q8H    bisacodyl  10 mg Oral Once    magnesium hydroxide  30 mL Oral Once    amLODIPine  10 mg Oral Daily    DULoxetine  60 mg Oral Daily    hydroxychloroquine  300 mg Oral Daily    levETIRAcetam  1,000 mg Oral BID    levothyroxine  200 mcg Oral Daily    pantoprazole  40 mg Oral QAM AC    potassium bicarb-citric acid  20 mEq Oral BID    pregabalin  300 mg Oral BID    sertraline  50 mg Oral Daily    traZODone  200 mg Oral Nightly    tiotropium-olodaterol  2 puff Inhalation Daily    sodium chloride flush  5-40 mL IntraVENous 2 times per day    famotidine  20 mg Oral BID    enoxaparin  40 mg SubCUTAneous Daily    ipratropium-albuterol  3 mL Inhalation 4x daily     Continuous Infusions:   sodium chloride Stopped (10/11/22 0643)     PRN Meds:   HYDROcodone-acetaminophen, 1 tablet, Q4H PRN  polyethylene glycol, 17 g, Daily PRN  guaiFENesin-dextromethorphan, 5 mL, Q4H PRN  Benzocaine-Menthol, 1 lozenge, Q2H PRN  ondansetron, 4 mg, Q8H PRN  sodium chloride flush, 5-40 mL, PRN  sodium chloride, 25 mL, PRN  acetaminophen, 650 mg, Q6H PRN   Or  acetaminophen, 650 mg, Q6H PRN  albuterol, 2.5 mg, Q4H PRN      DATA:  Complete Blood Count:   Recent Labs     10/16/22  0515 10/18/22  0545   WBC 5.1 4.6   RBC 3.93* 4.25   HGB 11.8* 12.7   HCT 37.1 39.5   MCV 94.4 92.9   RDW 13.2 13.1    251   SEGS 90* 81*   NEUTROABS 4.59 3.73   LYMPHOPCT 7* 12*   LYMPHSABS 0.36* 0.57*   MONOPCT 3 6   EOSRELPCT 0* 0*   BASOPCT 0 0   IMMGRAN 0 1*        Recent Blood Glucose:   Recent Labs     10/16/22  0515 10/18/22  0545   GLUCOSE 160* 148*        Comprehensive Metabolic Profile:   Recent Labs     10/16/22  0515 10/18/22  0545   BUN 17 20   CREATININE 0.48* 0.44*    141   K 4.1 4.0    99   CALCIUM 10.1 9.7   ANIONGAP 7* 9   CO2 34* 33*   PROT 7.1  --    LABALBU 4.1  --    BILITOT <0.1*  --    ALKPHOS 80  --    AST 23  --    ALT 27  --         Urinalysis:   Lab Results   Component Value Date/Time    NITRU NEGATIVE 10/08/2022 11:24 AM    COLORU Yellow 10/08/2022 11:24 AM    PHUR 6.0 10/08/2022 11:24 AM    WBCUA 0 TO 2 09/09/2022 09:29 PM    RBCUA 0 TO 2 09/09/2022 09:29 PM    MUCUS NOT REPORTED 10/14/2021 08:07 PM    TRICHOMONAS NOT REPORTED 10/14/2021 08:07 PM    YEAST NOT REPORTED 10/14/2021 08:07 PM    BACTERIA 1+ 09/09/2022 09:29 PM    SPECGRAV 1.015 10/08/2022 11:24 AM    LEUKOCYTESUR NEGATIVE 10/08/2022 11:24 AM    UROBILINOGEN Normal 10/08/2022 11:24 AM    BILIRUBINUR NEGATIVE 10/08/2022 11:24 AM    GLUCOSEU NEGATIVE 10/08/2022 11:24 AM    KETUA NEGATIVE 10/08/2022 11:24 AM    AMORPHOUS 2+ 09/09/2022 09:29 PM       HgBA1c:    Lab Results   Component Value Date/Time    LABA1C 5.7 06/20/2022 10:00 AM       TSH:    Lab Results   Component Value Date/Time    TSH 11.11 06/20/2022 10:01 AM       Lactic Acid:   Lab Results   Component Value Date/Time    LACTA 1.9 04/28/2022 03:14 PM    LACTA 2.0 02/06/2022 10:51 AM    LACTA 2.5 02/05/2022 03:30 PM        High Sensitivity Troponin:  No results for input(s): TROPHS in the last 72 hours. Pro-BNP:  Lab Results   Component Value Date    PROBNP 117 10/08/2022     D-Dimer:  Lab Results   Component Value Date    DDIMER 0.69 (H) 01/18/2022     PT/INR:    Lab Results   Component Value Date/Time    PROTIME 13.0 10/08/2022 11:01 AM    INR 1.0 10/08/2022 11:01 AM     PTT:    Lab Results   Component Value Date/Time    APTT 28.7 10/08/2022 11:01 AM       CRP: No results for input(s): CRP in the last 72 hours. ABGs:   Lab Results   Component Value Date/Time    PHART 7.400 10/14/2022 07:55 AM    QYE0TGZ 51.4 10/14/2022 07:55 AM    PO2ART 99.0 10/14/2022 07:55 AM    AJK7WTJ 31.1 10/14/2022 07:55 AM    DKS6VDT 35 11/02/2020 04:16 AM    L1RUROHI 97.4 10/14/2022 07:55 AM    FIO2 28 10/10/2022 05:30 AM         Radiology/Imaging:  XR CHEST (2 VW)   Final Result   Nonspecific reticulonodular airspace opacities could reflect pulmonary edema,   pneumonitis/pneumonia including viral/atypical etiology or combination there   of.         FL MODIFIED BARIUM SWALLOW W VIDEO   Final Result   Swallowing mechanism grossly within normal limits without evidence of   aspiration. 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 Consider barium esophagram follow-up if   indicated. Please see separate speech pathology report for full discussion of findings   and recommendations. RECOMMENDATIONS:   Unavailable         CT Head W/O Contrast   Final Result   No acute process. No significant change in primarily low-density left   convexity extra-axial collection. XR CHEST PORTABLE   Final Result   Suspected bilateral infectious/inflammatory airways process.                ASSESSMENT / PLAN:     Sepsis due to pneumonia ([address]  Continue current therapy  Completed IV Zosyn  Completed IV Levaquin  Start ZPak  Stop IV fluids  Trend labs-improving  Remove Sheikh  Acute on chronic respiratory failure with hypoxia and hypercapnia  Appreciate Dr. Paula Hoyt-improving  Wean oxygen  CPAP intemittant-refused  Mucomyst neb x 2 doses given  Continue Chest Vest  COPD/pulmonary fibrosis  Continue Anoro Ellipta, duo nebs  Start ZPak  Hypertension  Continue amlodipine  Chronic pain syndrome  Continue Norco  Nutrition status:   at risk for malnutrition  Dietician consult initiated  [] NPO [] TPN      [] Tube feed [] Clear liquid        [] Full liquid [x] regular diet         [] Fluid restriction   [] Diabetic diet   Prophylaxis:   DVT: Lovenox   Stress Ulcer: H2 Blocker   High risk medications: none   Disposition:    Discharge plan is Gianni Segal today  She is approved by Insurance at this time        RYAN Poole - CNP , RYAN-NP-C  10/18/2022  8:02 AM

## 2022-10-18 NOTE — PLAN OF CARE
Problem: Discharge Planning  Goal: Discharge to home or other facility with appropriate resources  Outcome: Progressing  Flowsheets (Taken 10/18/2022 5434)  Discharge to home or other facility with appropriate resources: Identify barriers to discharge with patient and caregiver     Problem: Safety - Adult  Goal: Free from fall injury  Outcome: Progressing  Flowsheets (Taken 10/18/2022 0750)  Free From Fall Injury: Instruct family/caregiver on patient safety     Problem: ABCDS Injury Assessment  Goal: Absence of physical injury  Outcome: Progressing  Flowsheets (Taken 10/18/2022 0750)  Absence of Physical Injury: Implement safety measures based on patient assessment     Problem: Skin/Tissue Integrity  Goal: Absence of new skin breakdown  Description: 1. Monitor for areas of redness and/or skin breakdown  2. Assess vascular access sites hourly  3. Every 4-6 hours minimum:  Change oxygen saturation probe site  4. Every 4-6 hours:  If on nasal continuous positive airway pressure, respiratory therapy assess nares and determine need for appliance change or resting period.   Outcome: Progressing     Problem: Pain  Goal: Verbalizes/displays adequate comfort level or baseline comfort level  Outcome: Progressing  Flowsheets (Taken 10/18/2022 0643)  Verbalizes/displays adequate comfort level or baseline comfort level: Encourage patient to monitor pain and request assistance     Problem: Nutrition Deficit:  Goal: Optimize nutritional status  Outcome: Progressing  Flowsheets (Taken 10/17/2022 1230 by Rosie Frances, RD, LD)  Nutrient intake appropriate for improving, restoring, or maintaining nutritional needs:   Monitor oral intake, labs, and treatment plans   Recommend appropriate diets, oral nutritional supplements, and vitamin/mineral supplements

## 2022-10-18 NOTE — PROGRESS NOTES
Writer at bedside for reassessment. Patient sitting up in chair on 2 L. Vitals obtained and reassessment completed, see flowsheet for details. Patient assisted to the bedside commode and back to the chair. pt denies further needs at this time. Call light in reach, will continue to monitor.

## 2022-10-18 NOTE — DISCHARGE SUMMARY
Discharge Summary    Gael Ann  :  1945  MRN:  [id number]    Admit date:  10/8/2022      Discharge date: 10/18/2022     Admitting Physician:  Lindsay Mcclain MD    Discharge Diagnoses:    Principal Problem:    Sepsis due to pneumonia [facility])  Active Problems:    Pulmonary fibrosis ([facility]    COPD (chronic obstructive pulmonary disease) ([facility]  Resolved Problems:    * No resolved hospital problems. [facility] Course: Gael Ann is a 69 y.o. female admitted with sepsis due to pneumonia. She presented from the [facility] with complaining of worsening shortness of breath. Patient was unable to provide any history in the emergency room due to decreased altered mental status. Patient was admitted into ICU as she arrived on CPAP. Patient was found to be tachycardic and hypoxic. Patient had significant leukocytosis with left shift but was afebrile. Lactic acid was normal.  She was hypotensive with systolic blood pressures in the 80s and was treated with 30mL/kg IV fluid bolus per sepsis protocol which improved her blood pressure. No vasopressors were required. Patient continued with IV fluids and respiratory support including supplemental oxygen as well as CPAP. Patient was placed on IV Zosyn and Levaquin and completed a course. Patient was given steroids as well as nebulizer treatments. Patient was evaluated by Dr. Ricardo Ag, pulmonology due to continued shortness of breath he recommended Z-Devin at this time. Hemodynamically patient is stable. She is working with PT and OT however continues to be significantly weak. Patient will be discharged today she will return to the [facility] and continue with Z-Devin. She will follow-up with primary care as an outpatient.     Consultants:  Dr. Manisha Wallis, pulmonology    Procedures: none    Complications: none    Discharge Condition: fair    Exam:  GEN:    Awake and following commands:      [] No               [x] Yes  MENTAL STATUS: alert and oriented x3.   DISTRESS: Acute respiratory distress:          [x] No               [] Yes  EYES:   EOMI, pupils equal   NECK: Supple. No lymphadenopathy. No carotid bruit  CVS:     regular but tachycardic, no audible murmur  PULM: Clear, few scattered wheeze no acute respiratory distress  ABD:     Bowels sounds normal.  Abdomen is soft. No distention. no tenderness to palpation. EXT:     trace edema bilaterally . No calf tenderness. NEURO: Moves all extremities. Motor and sensory are grossly intact  SKIN:    No rashes. No skin lesions. Significant Diagnostic Studies:   Lab Results   Component Value Date    WBC 4.6 10/18/2022    HGB 12.7 10/18/2022     10/18/2022       Lab Results   Component Value Date    BUN 20 10/18/2022    CREATININE 0.44 (L) 10/18/2022     10/18/2022    K 4.0 10/18/2022    CALCIUM 9.7 10/18/2022    CL 99 10/18/2022    CO2 33 (H) 10/18/2022    LABGLOM >60 10/18/2022       Lab Results   Component Value Date    WBCUA 0 TO 2 09/09/2022    RBCUA 0 TO 2 09/09/2022    EPITHUA 0 TO 2 09/09/2022    LEUKOCYTESUR NEGATIVE 10/08/2022    SPECGRAV 1.015 10/08/2022    GLUCOSEU NEGATIVE 10/08/2022    KETUA NEGATIVE 10/08/2022    PROTEINU NEGATIVE 10/08/2022    HGBUR NEGATIVE 10/08/2022    CASTUA NOT REPORTED 10/14/2021    CRYSTUA NOT REPORTED 10/14/2021    BACTERIA 1+ (A) 09/09/2022    YEAST NOT REPORTED 10/14/2021       CT Head W/O Contrast    Result Date: 10/8/2022  EXAMINATION: CT OF THE HEAD WITHOUT CONTRAST  10/8/2022 10:35 am TECHNIQUE: CT of the head was performed without the administration of intravenous contrast. Automated exposure control, iterative reconstruction, and/or weight based adjustment of the mA/kV was utilized to reduce the radiation dose to as low as reasonably achievable. COMPARISON: 09/27/2022.  HISTORY: ORDERING SYSTEM PROVIDED HISTORY: Altered mental status TECHNOLOGIST PROVIDED HISTORY: Altered mental status Decision Support Exception - unselect if not a suspected or confirmed emergency medical condition->Emergency Medical Condition (MA) FINDINGS: BRAIN/VENTRICLES: There is no acute intracranial hemorrhage, or midline shift. No significant change in primarily low-density left convexity extra-axial collection. The gray-white differentiation is maintained without evidence of an acute infarct. There is no evidence of hydrocephalus. ORBITS: The visualized portion of the orbits demonstrate no acute abnormality. SINUSES: The visualized paranasal sinuses and mastoid air cells demonstrate no acute abnormality. SOFT TISSUES/SKULL:  No acute abnormality of the visualized skull or soft tissues. No acute process. No significant change in primarily low-density left convexity extra-axial collection. XR CHEST PORTABLE    Result Date: 10/8/2022  EXAMINATION: ONE XRAY VIEW OF THE CHEST 10/8/2022 10:29 am COMPARISON: 09/09/2022 HISTORY: ORDERING SYSTEM PROVIDED HISTORY: Difficulty breathing TECHNOLOGIST PROVIDED HISTORY: Difficulty breathing FINDINGS: Cardiomediastinal silhouette is stable. Bilateral bronchial wall thickening with reticulonodular opacities. No pleural effusion or pneumothorax. No gross bony abnormality. Suspected bilateral infectious/inflammatory airways process.        Assessment and Plan:  Patient Active Problem List    Diagnosis Date Noted    Sepsis due to pneumonia ([address] 10/08/2022    Midline shift of brain due to hematoma ([address] 08/23/2022    Subdural hematoma 08/22/2022    Fall as cause of accidental injury in home as place of occurrence, initial encounter 08/22/2022    Pulmonary fibrosis ([address] 04/29/2022    A-fib ([address] 02/01/2022    Anemia 10/14/2021    Biventricular congestive heart failure ([address]     COPD (chronic obstructive pulmonary disease) ([address] 10/22/2020    Hypothyroidism 10/05/2018    Major depressive disorder 10/05/2018    Chronic midline low back pain without sciatica 10/05/2018    Iron deficiency anemia 10/05/2018        Discharge Medications:         Medication List        START taking these medications      azithromycin 250 MG tablet  Commonly known as: ZITHROMAX  Take 1 tablet by mouth daily for 4 doses  Start taking on: October 19, 2022     benzocaine-menthol 15-3.6 MG lozenge  Commonly known as: CEPACOL SORE THROAT  Take 1 lozenge by mouth every 2 hours as needed for Sore Throat            CHANGE how you take these medications      pregabalin 300 MG capsule  Commonly known as: LYRICA  What changed: Another medication with the same name was removed. Continue taking this medication, and follow the directions you see here. CONTINUE taking these medications      albuterol sulfate  (90 Base) MCG/ACT inhaler  Commonly known as: PROVENTIL;VENTOLIN;PROAIR  Inhale 2 puffs into the lungs 4 times daily     alendronate 70 MG tablet  Commonly known as: FOSAMAX  Take 1 tablet by mouth every 7 days On Sundays     amLODIPine 10 MG tablet  Commonly known as: NORVASC  Take 1 tablet by mouth daily     Anoro Ellipta 62.5-25 MCG/INH Aepb inhaler  Generic drug: umeclidinium-vilanterol  Inhale 1 puff into the lungs daily     calcium citrate-vitamin D 315-250 MG-UNIT Tabs per tablet  Commonly known as: CITRICAL + D     DULoxetine 60 MG extended release capsule  Commonly known as: CYMBALTA     HYDROcodone-acetaminophen  MG per tablet  Commonly known as: NORCO  Take 1 tablet by mouth every 8 hours as needed for Pain for up to 3 days.      hydroxychloroquine 200 MG tablet  Commonly known as: PLAQUENIL     ibuprofen 400 MG tablet  Commonly known as: ADVIL;MOTRIN  Take 1 tablet by mouth every 6 hours as needed for Pain     ipratropium-albuterol 0.5-2.5 (3) MG/3ML Soln nebulizer solution  Commonly known as: DUONEB     levETIRAcetam 100 MG/ML solution  Commonly known as: KEPPRA  Take 10 mLs by mouth 2 times daily     levothyroxine 200 MCG tablet  Commonly known as: SYNTHROID  Take 1 tablet by mouth Daily     ondansetron 4 MG disintegrating tablet  Commonly known as: ZOFRAN-ODT     pantoprazole sodium 40 MG Pack packet  Commonly known as: PROTONIX  Take 1 packet by mouth in the morning and 1 packet in the evening. Take before meals. potassium chloride 20 MEQ packet  Commonly known as: KLOR-CON     traZODone 100 MG tablet  Commonly known as: DESYREL            STOP taking these medications      aspirin 81 MG tablet     furosemide 20 MG tablet  Commonly known as: Lasix     sertraline 50 MG tablet  Commonly known as: ZOLOFT     Xarelto 20 MG Tabs tablet  Generic drug: rivaroxaban               Where to Get Your Medications        You can get these medications from any pharmacy    Bring a paper prescription for each of these medications  HYDROcodone-acetaminophen  MG per tablet       Information about where to get these medications is not yet available    Ask your nurse or doctor about these medications  azithromycin 250 MG tablet         Patient Instructions:    Activity: activity as tolerated  Diet: cardiac diet  Wound Care: none needed  Other: None    Disposition:   Discharge to Home    Follow up:  Patient will be followed by Kelly Vaughan MD in 1-2 weeks    CORE MEASURES on Discharge (if applicable)  ACE/ARB in CHF: NA  Statin in MI: NA  ASA in [place]: NA  Statin in CVA: NA  Antiplatelet in CVA: NA    Total time spent on discharge services: 45 minutes    Including the following activities:  Evaluation and Management of patient  Discussion with patient and/or surrogate about current care plan  Coordination with Case Management and/or   Coordination of care with Consultants (if applicable)   Coordination of care with Receiving Facility Physician (if applicable)  Completion of DME forms (if applicable)  Preparation of Discharge Summary  Preparation of Medication Reconciliation  Preparation of Discharge Prescriptions    Signed:  RYAN Connor CNP, RYAN, NP-C  10/18/2022, 11:30 AM

## 2022-10-18 NOTE — PROGRESS NOTES
Physical Therapy  Facility/Department: [facility] AT THE [facility] MED SURG  Daily Treatment Note  NAME: Aziza Macias  : 1945  MRN: [id number]  Date of Service: 10/18/2022  Discharge Recommendations:  Continue to assess pending progress, Subacute/Skilled Nursing Facility   Patient Diagnosis(es): The primary encounter diagnosis was Pneumonia due to infectious organism, unspecified laterality, unspecified part of lung. Diagnoses of CO2 retention and Confusion were also pertinent to this visit. Assessment   Assessment: Pt. able to take 5 steps x2 with [employer], Mod A with noted increased \"jerkiness\" and instability of LEs with poor control of [employer]. Josesito Sorto: Mod/Max x1. Seated exercises B LE x20  Activity Tolerance: Patient tolerated treatment well   Plan    Physcial Therapy Plan  General Plan: 2 times a day 7 days a week  Current Treatment Recommendations: Strengthening;Balance training;Functional mobility training;Transfer training;ADL/Self-care training;Neuromuscular re-education;Gait training; Safety education & training;Patient/Caregiver education & training   Restrictions  Restrictions/Precautions  Restrictions/Precautions: General Precautions  Required Braces or Orthoses?: No   Subjective    Subjective  Subjective: T. up in chair with vistors upon arrival, agreeable to therapy at this time. Pain: constant pain in LB 8/10  Orientation  Overall Orientation Status: Within Functional Limits   Objective   Bed Mobility Training  Bed Mobility Training: No  Transfer Training  Transfer Training: Yes  Overall Level of Assistance: Moderate assistance;Maximum assistance  Interventions: Safety awareness training; Tactile cues; Verbal cues  Sit to Stand: Moderate assistance;Maximum assistance  Stand Pivot Transfers: Moderate assistance;Maximum assistance  Toilet Transfer: Moderate assistance;Maximum assistance  Gait Training  Gait Training: Yes  Gait  Overall Level of Assistance:  Moderate assistance  Interventions: Verbal cues  Base of Support: Widened  Speed/Helen: Slow;Shuffled  Step Length: Right shortened;Left shortened  Gait Abnormalities: Antalgic  Distance (ft):  (5 steps x2)  Assistive Device: Walker, rolling  PT Exercises  Exercise Treatment: Seated exercises B Le x20  Other Specialty Interventions  Other Treatments/Modalities: BSC use and transfer  Safety Devices  Type of Devices: Call light within reach; Left in chair;Chair alarm in place;Nurse notified   Goals  Short Term Goals  Time Frame for Short Term Goals: 3 DAYS. Short Term Goal 1: CGA GAIT 50 FT FWW  Short Term Goal 2: SBA TRANSFERS AND SBA BED MOBILITY. Long Term Goals  Time Frame for Long Term Goals : 4 WEEKS  Long Term Goal 1: IND GAIT  FT,  Long Term Goal 2: IND TRANSFERS  Patient Goals   Patient Goals : RETURN HOME WITH ASSIST FROM . Education  Patient Education  Education Given To: Patient  Education Provided: Role of Therapy;Transfer Training; Fall Prevention Strategies  Education Provided Comments: Hand placement and safety for all transfers.  Proper use of [employer] with transfers for decreased fall risk  Education Method: Verbal;Demonstration  Barriers to Learning: None  Education Outcome: Verbalized understanding;Continued education needed  Therapy Time   Individual Concurrent Group Co-treatment   Time In 1508         Time Out 1532         Minutes [address] [place] [address]

## 2022-10-18 NOTE — PROGRESS NOTES
Writer at bedside for shift assessment. Patient sitting up in chair, respirations are even and unlabored while on 2 L. Vitals obtained and assessment completed, see flowsheet for details. pt denies further needs at this time. Call light in reach, will continue to monitor.

## 2022-10-18 NOTE — PROGRESS NOTES
Pt resting in chair at this time due to having back pain and pt stated it is more comfortable for her then sleeping in bed. Pt remains on 2 L NC. Pt denied any needs at this time. Pt has call light within reach. Will continue to monitor.

## 2022-10-18 NOTE — PROGRESS NOTES
Report called to the [facility], all questions and concerns answered. Pt satisfied.

## 2022-10-18 NOTE — PROGRESS NOTES
Transportation to the [facility] [place] [facility] coordinated through [address] for 5pm this evening. Facility and Archana hernandez.       [place]  10/18/2022

## 2022-10-18 NOTE — PROGRESS NOTES
Patient is discharge to the [facility] today. Discharge paper work done and fax  to SNF.   BUZZ Muhammad

## 2022-10-20 LAB
CULTURE: ABNORMAL
CULTURE: ABNORMAL
DIRECT EXAM: ABNORMAL
SPECIMEN DESCRIPTION: ABNORMAL

## 2022-10-26 ENCOUNTER — OFFICE VISIT (OUTPATIENT)
Dept: NEUROSURGERY | Age: 77
End: 2022-10-26
Payer: MEDICARE

## 2022-10-26 VITALS
WEIGHT: 160 LBS | OXYGEN SATURATION: 90 % | HEIGHT: 65 IN | SYSTOLIC BLOOD PRESSURE: 105 MMHG | HEART RATE: 73 BPM | DIASTOLIC BLOOD PRESSURE: 65 MMHG | BODY MASS INDEX: 26.66 KG/M2

## 2022-10-26 DIAGNOSIS — S06.5XAA SUBDURAL HEMATOMA: Primary | ICD-10-CM

## 2022-10-26 DIAGNOSIS — R41.3 MEMORY LOSS: ICD-10-CM

## 2022-10-26 PROCEDURE — G8484 FLU IMMUNIZE NO ADMIN: HCPCS | Performed by: NEUROLOGICAL SURGERY

## 2022-10-26 PROCEDURE — 1123F ACP DISCUSS/DSCN MKR DOCD: CPT | Performed by: NEUROLOGICAL SURGERY

## 2022-10-26 PROCEDURE — G8400 PT W/DXA NO RESULTS DOC: HCPCS | Performed by: NEUROLOGICAL SURGERY

## 2022-10-26 PROCEDURE — G8427 DOCREV CUR MEDS BY ELIG CLIN: HCPCS | Performed by: NEUROLOGICAL SURGERY

## 2022-10-26 PROCEDURE — 1090F PRES/ABSN URINE INCON ASSESS: CPT | Performed by: NEUROLOGICAL SURGERY

## 2022-10-26 PROCEDURE — G8417 CALC BMI ABV UP PARAM F/U: HCPCS | Performed by: NEUROLOGICAL SURGERY

## 2022-10-26 PROCEDURE — 99213 OFFICE O/P EST LOW 20 MIN: CPT | Performed by: NEUROLOGICAL SURGERY

## 2022-10-26 PROCEDURE — 1111F DSCHRG MED/CURRENT MED MERGE: CPT | Performed by: NEUROLOGICAL SURGERY

## 2022-10-26 PROCEDURE — 1036F TOBACCO NON-USER: CPT | Performed by: NEUROLOGICAL SURGERY

## 2022-10-26 NOTE — PROGRESS NOTES
[address]  [facility] [place] [place] [address] [facility] [address] [facility] [address]  Dept: [phone]    Patient:  Tawny Garcia  YOB: 1945  Date: 10/26/22    The patient is a 69 y.o. female who presents today for consult of the following problems:     Chief Complaint   Patient presents with    Post-Op Check             HPI:     Tawny Garcia is a 69 y.o. female on whom neurosurgical consultation was requested by Merced Rios MD for management of Severe TBI with subdural hematoma status postevacuation by craniotomy 2 months postop. The patient had gone to the [facility] in [facility] and had been recovering quite well. He was having some word finding issues with speech initially but this improved significantly and was actually to the point of ambulating and walking. Unfortunately more recently she ended up with sepsis and pneumonia and was readmitted to the hospital treated and then sent back to the nursing facility at the [facility].        History:     Past Medical History:   Diagnosis Date    A-fib ([address]     Biventricular congestive heart failure (HCC)     Bronchiectasis with acute exacerbation ([address] 04/29/2022    CAD (coronary artery disease)     Chronic pain syndrome     dilaudid infusion pump    COPD (chronic obstructive pulmonary disease) (HCC)     Depression     GERD (gastroesophageal reflux disease)     Hypothyroidism     Impaired fasting glucose     Iron deficiency anemia     Osteoporosis     Pulmonary fibrosis ([address] 04/29/2022    Subdural hematoma ([address] 08/23/2022     Past Surgical History:   Procedure Laterality Date    BACK SURGERY  09/09/1998    spinal cord stimulator    BACK SURGERY  08/04/1999    infusion pump insertion    BACK SURGERY  10/23/2003    spinal cord stimulator removed    BACK SURGERY  10/23/2003    new infusion pump placed    BACK SURGERY  09/11/2017    replaced infusion pump    CARPAL TUNNEL RELEASE Right 12/17/1999    CARPAL TUNNEL RELEASE Left 11/24/1999    CARPAL TUNNEL RELEASE Right 12/30/2002    CARPAL TUNNEL RELEASE Left 12/17/2003    CERVICAL [address] SURGERY  10/25/2004    anterior cervical diskectomy C7-T1    CERVICAL DISC SURGERY  12/22/2004    anterior cervical discectomy Q9-5 (complicated by damaged nerve to vocal cord)    CRANIOTOMY Left 8/23/2022    LEFT CRANIOTOMY FOR SUBDURAL HEMATOMA EVACUATION performed by Kristopher Khan DO at [address] [place] [address] Left 12/20/2018    ORIF wrist    HUMERUS FRACTURE SURGERY Right 10/03/2010    HYSTERECTOMY (CERVIX STATUS UNKNOWN)  08/20/1991    KNEE ARTHROSCOPY Right 06/02/2011    KNEE SURGERY Right 02/04/2016    repair distal portion of knee arthroplasty due to prosthesis loosening    LUMBAR DISC SURGERY  02/15/1994    due to scar tissue from previous surgery    LUMBAR DISCECTOMY  08/30/1993    L5-S1    LUMBAR LAMINECTOMY  06/09/2008    L3-4-5    NECK SURGERY  05/03/2002    posterior plate fusion    NECK SURGERY  12/09/2005    reconnect nerve to vocal cord [facility]    TOE AMPUTATION  10/08/2006    left 3rd toe - osteomyelitis    TOE AMPUTATION  03/07/2008    left 4th toe partial amputation    TOE AMPUTATION  10/03/2008    left 2nd toe    TOTAL KNEE ARTHROPLASTY Right 03/19/2021    WRIST FRACTURE SURGERY Left 12/20/2018    WRIST OPEN REDUCTION INTERNAL FIXATION-DISTAL RADIUS performed by Christina Cruz MD at [address] Left 12/20/2018    WRIST SURGERY Left 07/02/2019    left wrist ulnar shortening osteotomy     Family History   Problem Relation Age of Onset    Heart Attack Father 61    Heart Attack Sister     Heart Disease Brother      Current Outpatient Medications on File Prior to Visit   Medication Sig Dispense Refill    ondansetron (ZOFRAN-ODT) 4 MG disintegrating tablet Take 4 mg by mouth every 8 hours as needed for Nausea or Vomiting      amLODIPine (NORVASC) 10 MG tablet Take 1 tablet by mouth daily 30 tablet 3    benzocaine-menthol (CEPACOL SORE THROAT) 15-3.6 MG lozenge Take 1 lozenge by mouth every 2 hours as needed for Sore Throat 168 lozenge     levETIRAcetam (KEPPRA) 100 MG/ML solution Take 10 mLs by mouth 2 times daily  3    ibuprofen (ADVIL;MOTRIN) 400 MG tablet Take 1 tablet by mouth every 6 hours as needed for Pain 120 tablet 3    DULoxetine (CYMBALTA) 60 MG extended release capsule Take 60 mg by mouth daily      traZODone (DESYREL) 100 MG tablet Take 200 mg by mouth nightly      hydroxychloroquine (PLAQUENIL) 200 MG tablet Take 300 mg by mouth daily      calcium citrate-vitamin D (CITRICAL + D) 315-250 MG-UNIT TABS per tablet Take 1 tablet by mouth 2 times daily (with meals)      potassium chloride (KLOR-CON) 20 MEQ packet Take 20 mEq by mouth 2 times daily      alendronate (FOSAMAX) 70 MG tablet Take 1 tablet by mouth every 7 days On Sundays 12 tablet 3    pantoprazole sodium (PROTONIX) 40 MG PACK packet Take 1 packet by mouth in the morning and 1 packet in the evening. Take before meals. 180 each 3    levothyroxine (SYNTHROID) 200 MCG tablet Take 1 tablet by mouth Daily 90 tablet 3    ipratropium-albuterol (DUONEB) 0.5-2.5 (3) MG/3ML SOLN nebulizer solution 3 mLs      umeclidinium-vilanterol (ANORO ELLIPTA) 62.5-25 MCG/INH AEPB inhaler Inhale 1 puff into the lungs daily 3 each 3    albuterol sulfate  (90 Base) MCG/ACT inhaler Inhale 2 puffs into the lungs 4 times daily 3 each 3    pregabalin (LYRICA) 300 MG capsule Take 1 capsule by mouth 2 times daily for 5 days. 10 capsule 0    [DISCONTINUED] XARELTO 20 MG TABS tablet TAKE 1 TABLET BY MOUTH  DAILY WITH BREAKFAST 90 tablet 3    [DISCONTINUED] aspirin 81 MG tablet Take 81 mg by mouth daily       No current facility-administered medications on file prior to visit.      Social History     Tobacco Use    Smoking status: Former     Packs/day: 1.00     Years: 10.00     Pack years: 10.00     Types: Cigarettes     Quit date: 1976     Years since quittin.9    Smokeless tobacco: Never   Vaping Use    Vaping Use: Never used   Substance Use Topics    Alcohol use: No    Drug use: No       No Known Allergies    Review of Systems  ROS: headache, memory loss. weakness    Physical Exam:      /65   Pulse 73   Ht 5' 5\" (1.651 m)   Wt 160 lb (72.6 kg)   SpO2 90%   BMI 26.63 kg/m²   Estimated body mass index is 26.63 kg/m² as calculated from the following:    Height as of this encounter: 5' 5\" (1.651 m). Weight as of this encounter: 160 lb (72.6 kg). General:  Ruth Reid is a 69y.o. year old female who appears her stated age. HEENT: Normocephalic atraumatic. Neck supple. Chest: regular rate; pulses equal. Equal chest rise and fall  Abdomen: Soft nondistended. Ext: DP equal with good capillary refill  Neuro    Mentation  Appropriate affect   oriented    Cranial Nerves:   Pupils equal and reactive to light  Extraocular motion intact  Face symmetric  No dysarthria  v1-3 sensation symmetric, masseter tone symmetric  Hearing symmetric and intact to finger rub    Sensation:   intact    Motor  L deltoid 5/5; R deltoid 5/5  L biceps 5/5; R biceps 5/5  L triceps 5/5; R triceps 5/5  L wrist extension 5/5; R wrist extension 5/5  L intrinsics 5/5; R intrinsics 5/5     Tremulous 4/5 diffuse    Reflexes  L Brachioradialis 2+/4; R brachioradialis 2+/4  L Biceps 2+/4; R Biceps 2+/4  L Triceps 2+/4; R Triceps 2+/4  L Patellar 2+/4: R Patellar 2+/4  L Achilles 2+/4; R Achilles 2+/4    hoffmans L: neg  hoffmans R: neg  Clonus L: neg  Clonus R: neg  Babinski L: up  Babinski R; up    Studies Review:     Hct 10/5 resolved with minimal SD    Assessment and Plan:      1. Subdural hematoma    2. Memory loss          Plan: No further scans. Patient okay to resume all anticoagulation including Eliquis and aspirin. Counseled her on fall risk with the anticoagulation on board.   Recommend continuation of speech therapy for the patient's memory deficits and it appears that she recovered quite well and still has significant potential.  Regarding the Keppra I would recommend weaning it down to dropping it to once a day daily for approximate 1 week and then stopping. I did reiterate to the patient and spouse that there is the risk of epilepsy and seizure with retrying the seizure medication but there is no strong indications for long-term continuation considering the absence of additional seizures immediately after the injury or in a delayed fashion. Initially Keppra was used only for prophylaxis and continued after evacuation. Followup: No follow-ups on file. Prescriptions Ordered:  No orders of the defined types were placed in this encounter. Orders Placed:  No orders of the defined types were placed in this encounter. Electronically signed by Pantera Sharma DO on 10/26/2022 at 10:25 AM    Please note that this chart was generated using voice recognition Dragon dictation software. Although every effort was made to ensure the accuracy of this automated transcription, some errors in transcription may have occurred.

## 2022-11-07 ENCOUNTER — OFFICE VISIT (OUTPATIENT)
Dept: PULMONOLOGY | Age: 77
End: 2022-11-07
Payer: MEDICARE

## 2022-11-07 VITALS
DIASTOLIC BLOOD PRESSURE: 59 MMHG | SYSTOLIC BLOOD PRESSURE: 113 MMHG | HEART RATE: 106 BPM | OXYGEN SATURATION: 91 % | WEIGHT: 160.3 LBS | TEMPERATURE: 97.9 F | HEIGHT: 65 IN | BODY MASS INDEX: 26.71 KG/M2 | RESPIRATION RATE: 16 BRPM

## 2022-11-07 DIAGNOSIS — K44.9 HIATAL HERNIA: ICD-10-CM

## 2022-11-07 DIAGNOSIS — J47.9 BRONCHIECTASIS WITHOUT COMPLICATION (HCC): ICD-10-CM

## 2022-11-07 DIAGNOSIS — J84.10 PULMONARY FIBROSIS (HCC): ICD-10-CM

## 2022-11-07 DIAGNOSIS — J69.0 ASPIRATION PNEUMONIA, UNSPECIFIED ASPIRATION PNEUMONIA TYPE, UNSPECIFIED LATERALITY, UNSPECIFIED PART OF LUNG (HCC): Primary | ICD-10-CM

## 2022-11-07 DIAGNOSIS — K21.9 GASTROESOPHAGEAL REFLUX DISEASE, UNSPECIFIED WHETHER ESOPHAGITIS PRESENT: ICD-10-CM

## 2022-11-07 DIAGNOSIS — Z86.16 HISTORY OF COVID-19: ICD-10-CM

## 2022-11-07 PROCEDURE — 1036F TOBACCO NON-USER: CPT | Performed by: INTERNAL MEDICINE

## 2022-11-07 PROCEDURE — 1090F PRES/ABSN URINE INCON ASSESS: CPT | Performed by: INTERNAL MEDICINE

## 2022-11-07 PROCEDURE — 99214 OFFICE O/P EST MOD 30 MIN: CPT | Performed by: INTERNAL MEDICINE

## 2022-11-07 PROCEDURE — G8400 PT W/DXA NO RESULTS DOC: HCPCS | Performed by: INTERNAL MEDICINE

## 2022-11-07 PROCEDURE — G8484 FLU IMMUNIZE NO ADMIN: HCPCS | Performed by: INTERNAL MEDICINE

## 2022-11-07 PROCEDURE — 1123F ACP DISCUSS/DSCN MKR DOCD: CPT | Performed by: INTERNAL MEDICINE

## 2022-11-07 PROCEDURE — 1111F DSCHRG MED/CURRENT MED MERGE: CPT | Performed by: INTERNAL MEDICINE

## 2022-11-07 PROCEDURE — G8428 CUR MEDS NOT DOCUMENT: HCPCS | Performed by: INTERNAL MEDICINE

## 2022-11-07 PROCEDURE — G8417 CALC BMI ABV UP PARAM F/U: HCPCS | Performed by: INTERNAL MEDICINE

## 2022-11-07 NOTE — PATIENT INSTRUCTIONS
SURVEY:    You may be receiving a survey from [employer] regarding your visit today. Please complete the survey to enable us to provide the highest quality of care to you and your family. If you cannot score us a very good on any question, please call the office to discuss how we could have made your experience a very good one. Thank you.

## 2022-11-07 NOTE — PROGRESS NOTES
PULMONARY OP  PROGRESS NOTE      Patient:  Hermelinda Jimenes  YOB: 1945    MRN: [id number]     Acct: [de-identified]       Pt seen and Chart reviewed. Hermelinda Jimenes is here in followup for   1. Aspiration pneumonia, unspecified aspiration pneumonia type, unspecified laterality, unspecified part of lung ([facility]    2. Gastroesophageal reflux disease, unspecified whether esophagitis present    3. Hiatal hernia    4. Bronchiectasis without complication ([address]    5. History of COVID-19    6. Pulmonary fibrosis ([address]          Pt  has been hospitalized since last visit for a pneumonia. Has been using meds as recommended.   Denied much of cough or sputum production  Shortness of breath and wheezing has improved  No chest pain or pressure  Her appetite has improved  No fever or chills or night sweats  Not coughing up any blood  No acid reflux symptoms  No orthopnea, PND or increasing pedal edema    Subjective:  Review of Systems    Review of Systems -   General ROS: Completed and except as mentioned above were negative   Psychological ROS:  Completed and except as mentioned above were negative  Ophthalmic ROS:  Completed and except as mentioned above were negative  ENT ROS:  Completed and except as mentioned above were negative  Allergy and Immunology ROS:  Completed and except as mentioned above werenegative  Hematological and Lymphatic ROS:  Completed and except as mentioned above were negative  Endocrine ROS: Completed and except as mentioned above were negative  Respiratory ROS:  Completed and except asmentioned above were negative  Cardiovascular ROS:  Completed and except as mentioned above were negative  Gastrointestinal ROS: Completed and except as mentioned above were negative  Genito-Urinary ROS:  Completedand except as mentioned above were negative  Musculoskeletal ROS:  Completed and except as mentioned above were negative  Neurological ROS:  Completed and except as mentioned above were negative  Dermatological ROS:Completed and except as mentioned above were negative    SLEEP  No epistaxis or sore throat. does not have fatigue, tiredness or sleepiness in am.    No MVA. No edema. Allergies:  No Known Allergies    Medications:    Current Outpatient Medications:     ipratropium-albuterol (DUONEB) 0.5-2.5 (3) MG/3ML SOLN nebulizer solution, 3 mLs, Disp: , Rfl:     umeclidinium-vilanterol (ANORO ELLIPTA) 62.5-25 MCG/INH AEPB inhaler, Inhale 1 puff into the lungs daily, Disp: 3 each, Rfl: 3    albuterol sulfate  (90 Base) MCG/ACT inhaler, Inhale 2 puffs into the lungs 4 times daily, Disp: 3 each, Rfl: 3    pregabalin (LYRICA) 300 MG capsule, Take 1 capsule by mouth 2 times daily for 5 days. , Disp: 10 capsule, Rfl: 0    ondansetron (ZOFRAN-ODT) 4 MG disintegrating tablet, Take 4 mg by mouth every 8 hours as needed for Nausea or Vomiting, Disp: , Rfl:     amLODIPine (NORVASC) 10 MG tablet, Take 1 tablet by mouth daily, Disp: 30 tablet, Rfl: 3    benzocaine-menthol (CEPACOL SORE THROAT) 15-3.6 MG lozenge, Take 1 lozenge by mouth every 2 hours as needed for Sore Throat, Disp: 168 lozenge, Rfl:     levETIRAcetam (KEPPRA) 100 MG/ML solution, Take 10 mLs by mouth 2 times daily, Disp: , Rfl: 3    ibuprofen (ADVIL;MOTRIN) 400 MG tablet, Take 1 tablet by mouth every 6 hours as needed for Pain, Disp: 120 tablet, Rfl: 3    DULoxetine (CYMBALTA) 60 MG extended release capsule, Take 60 mg by mouth daily, Disp: , Rfl:     traZODone (DESYREL) 100 MG tablet, Take 200 mg by mouth nightly, Disp: , Rfl:     hydroxychloroquine (PLAQUENIL) 200 MG tablet, Take 300 mg by mouth daily, Disp: , Rfl:     calcium citrate-vitamin D (CITRICAL + D) 315-250 MG-UNIT TABS per tablet, Take 1 tablet by mouth 2 times daily (with meals), Disp: , Rfl:     potassium chloride (KLOR-CON) 20 MEQ packet, Take 20 mEq by mouth 2 times daily, Disp: , Rfl:     alendronate (FOSAMAX) 70 MG tablet, Take 1 tablet by mouth every 7 days On Sundays, Disp: 12 tablet, Rfl: 3    pantoprazole sodium (PROTONIX) 40 MG PACK packet, Take 1 packet by mouth in the morning and 1 packet in the evening. Take before meals. , Disp: 180 each, Rfl: 3    levothyroxine (SYNTHROID) 200 MCG tablet, Take 1 tablet by mouth Daily, Disp: 90 tablet, Rfl: 3      Objective:    Physical Exam:  Vitals: BP (!) 113/59   Pulse (!) 106   Temp 97.9 °F (36.6 °C)   Resp 16   Ht 5' 5\" (1.651 m)   Wt 160 lb 4.8 oz (72.7 kg)   SpO2 91%   BMI 26.68 kg/m²   Last 3 weights: Wt Readings from Last 3 Encounters:   11/07/22 160 lb 4.8 oz (72.7 kg)   10/26/22 159 lb (72.1 kg)   10/26/22 160 lb (72.6 kg)     Body mass index is 26.68 kg/m². Physical Examination:   PHYSICAL EXAMINATION:    Vitals:    11/07/22 1327   BP: (!) 113/59   Pulse: (!) 106   Resp: 16   Temp: 97.9 °F (36.6 °C)   SpO2: 91%   Weight: 160 lb 4.8 oz (72.7 kg)   Height: 5' 5\" (1.651 m)       Constitutional: This is a well developed, well nourished, 25-29.9 - Overweight 69y.o. year old female who is alert, oriented, cooperative and in no apparent distress. Head:normocephalic and atraumatic. EENT:  JP. No conjunctival injections. Septum was midline, mucosa was without erythema, exudates or cobblestoning. No thrush was noted. MallampatiII (soft palate, uvula, fauces visible)  Neck: Supple without thyromegaly. No elevated JVP. Trachea was midline. Respiratory: Chest was symmetrical without dullness to percussion. Breath sounds bilaterally were clear to auscultation. There were no wheezes, rhonchi or rales. There is no intercostal retraction or use of accessory muscles. No egophony noted. Cardiovascular: Regular without murmur, clicks, gallops or rubs. Abdomen: Slightly rounded and soft without organomegaly. No rebound, rigidity or guarding was appreciated. Lymphatic: No lymphadenopathy. Musculoskeletal: Normal curvature of the spine. No gross muscle weakness.     Extremities:  does not have lower extremity edema,  no ulcerations, tenderness, varicosities or erythema. Muscle size, tone and strength are normal.  No involuntary movements are noted. Skin:  Warm and dry. Good color, turgor and pigmentation. No lesions or scars. No cyanosis or clubbing  Neurological/Psychiatric: The patient's general behavior, level of consciousness, thought content and emotional status is normal.          DATA:     Pulmonary function tests: none since last evaluated    CT scan of the chest in April 2022 showed-    Moderate multifocal ground-glass opacities consistent with patient's history   of COVID-19, somewhat improved. Other incidental findings as above. Chest x-ray on October 14, 2022 showed-    Nonspecific reticulonodular airspace opacities could reflect pulmonary edema,   pneumonitis/pneumonia including viral/atypical etiology or combination there   of. IMPRESSION:   1. Aspiration pneumonia, unspecified aspiration pneumonia type, unspecified laterality, unspecified part of lung ([address]    2. Gastroesophageal reflux disease, unspecified whether esophagitis present    3. Hiatal hernia    4. Bronchiectasis without complication ([address]    5. History of COVID-19    6. Pulmonary fibrosis (HCC)    Regarding pulmonary fibrosis, review of imaging does not show any persistent fibrotic changes               PLAN:       Reviewed the chest x-ray and CT scan of the chest  Ordered a chest x-ray to be done in early December 2022  Refills were provided -none  Patient was recommended to have prednisone and an antibiotic available for use during an exacerbation  Educated and clarified the medication use. Discussed use, benefit, and side effects of prescribed medications. Barriers to medication compliance addressed. Diana Louis received counseling on the following healthy behaviors: nutrition, exercise and medication adherence  Recommend flu vaccination in the fall annually.   Recommendations given regarding pneumococcal vaccinations. Recommend COVID-19, omicron booster  Patient is not uptodate with vaccinations from pulmonary perspective. Maintain an active lifestyle. continue smoking cessation. Recommend pulmonary rehabilitation. Patient was explained importance of pulmonary rehabilitation with regards to decreasing the hospitalization risk and also help with his dyspnea  Patient was educated on how to use the respiratory medications. All the questions that the patient has had were answered to   her satisfaction. Home O2 evaluation was done. Supplemental oxygen was not indicated  After reviewing the patient's smoking history and her age patient does not meet the criteria for lung cancer screening as she quit smoking more than 15 years ago. We'll see the patient back in 3 months or earlier if needed. Patient will call us if she is sick, so she can be seen sooner. Thank you for having us involved in the care of your patient. Please call us if you have any questions or concerns.           Teo Roblero MD, MD             11/7/2022, 1:49 PM

## 2022-11-09 NOTE — PROGRESS NOTES
Speech Language Pathology  Facility/Department: [facility] AT THE [facility]   CLINICAL BEDSIDE SWALLOW EVALUATION    NAME: Esperanza Jeans  : 1945  MRN: [id number]    ADMISSION DATE: 2022    Visit Diagnoses       Codes    Multifocal pneumonia    -  Primary J18.9          Past Medical History:  has a past medical history of Chronic pain syndrome, COPD (chronic obstructive pulmonary disease) ([address] Depression, GERD (gastroesophageal reflux disease), Hypothyroidism, and Osteoporosis. Past Surgical History:  has a past surgical history that includes Hysterectomy; back surgery; Breast surgery; Carpal tunnel release; joint replacement; joint replacement; fracture surgery (Left, 2018); and Wrist fracture surgery (Left, 2018). Recent Chest Xray/CT of Chest: 2022   No evidence of pulmonary embolism.       Similar extensive persistent pulmonary abnormalities/bronchitis compatible   with the history of COVID pneumonia.  Some improvement noted left base but   worsening seen on the right, mostly RLL; consider increasing viral or   possibly superimposed bacterial or other bronchopneumonia with some   consolidation.       Large hiatus hernia.  Similar mild mediastinal adenopathy.       Additional unchanged findings, as above.           Date of Eval: 2022  Evaluating Therapist: HILLARY Dennison    Current Diet level:  Current Diet : Regular  Current Liquid Diet : Thin    Primary Complaint:    pt presents with pneumonia. Pt reported she has pneumonia several times a year. Pt reports no concerns with PO intake      Pain:   Denies during BSE    Reason for Referral  Esperanza Jeans was referred for a bedside swallow evaluation to assess the efficiency of her swallow function, identify signs and symptoms of aspiration, and make recommendations regarding safe dietary consistencies, effective compensatory strategies, and safe eating environment.     Impression  Dysphagia Diagnosis  Dysphagia Diagnosis: Swallow function appears grossly intact  Dysphagia Outcome Severity Scale: Level 7: Normal in all situations    Treatment Plan  Requires SLP Intervention: No     Duration/Frequency of Treatment: no therapy warranted at this time       Recommended Diet and Intervention  Solid: Diet Solids Recommendation: Regular  Liquid: Liquid Consistency Recommendation: Thin  Medication:Recommended Form of Meds: PO (pt prefers with applesauce)  Therapeutic Interventions: Patient/Family education    Compensatory Swallowing Strategies Attempted  Compensatory Swallowing Strategies: Alternate solids and liquids;Eat/Feed slowly;Upright as possible for all oral intake;Small bites/sips      General  Chart Reviewed: Yes  Behavior/Cognition  Behavior/Cognition: Alert; Cooperative  Respiratory Status: O2 via nasual cannula  O2 Device: Nasal cannula  Communication Observation: Functional  Follows Directions: Complex  Dentition: Dentures top;Dentures bottom  Patient Positioning: Upright in chair  Baseline Vocal Quality: Normal      Vision/Hearing  Vision  Vision: Within Functional Limits  Hearing  Hearing: Within functional limits    Oral Motor Deficits  Oral/Motor  Oral Motor: Within functional limits    Oral Phase Dysfunction  Oral Phase  Oral Phase: WFL     Indicators of Pharyngeal Phase Dysfunction   Pharyngeal Phase  Pharyngeal Phase: WFL    Prognosis  Prognosis  Prognosis for safe diet advancement: good  Barriers to reach goals: age;severity of dysphagia  Individuals consulted  Consulted and agree with results and recommendations: Patient;RN    Education  Patient Education: pt education completed with pt re: s/s aspiration to monitor for and results of BSE. Discussed no diet change at this time  Patient Education Response: Verbalizes understanding       Therapy Time  SLP Individual Minutes  Time In: 0915  Time Out: 0930  Minutes: 15          Pt seen for BSE this date while upright in the chair.  Pt and nurse both state no concerns at this time with swallowing. Pt reported there has been no hx of Dysphagia however she has pneumonia several times a year. Pt tolerated all trials (puree, soft, regular and thin via cup) without overt s/s aspiration. Pt was independent with self feeding, mastication and oral clearance. Pt demonstrated no overt s/s aspiration. At this time pt appears safe with PO intake on current diet. Therapy is not warranted at this time.  Contact ST if further questions or needs arise     Shari Tam M.S.,CCC-SLP    2/6/2022 9:26 AM 1.64

## 2022-11-17 ENCOUNTER — CARE COORDINATION (OUTPATIENT)
Dept: CASE MANAGEMENT | Age: 77
End: 2022-11-17

## 2022-11-17 DIAGNOSIS — J18.9 SEPSIS DUE TO PNEUMONIA (HCC): Primary | ICD-10-CM

## 2022-11-17 DIAGNOSIS — A41.9 SEPSIS DUE TO PNEUMONIA (HCC): Primary | ICD-10-CM

## 2022-11-17 PROCEDURE — 1111F DSCHRG MED/CURRENT MED MERGE: CPT | Performed by: INTERNAL MEDICINE

## 2022-11-17 NOTE — CARE COORDINATION
[address] [place] [address] Discharge Call    2022    Patient: Cortez Velazco Patient : 1945   MRN: [id number]  Reason for Admission: Sepsis d/t Pneumonia  Discharge Date: 10/18/22 RARS: Readmission Risk Score: 28.1    Acute Care Course: Patient admitted to [facility] 10/8-10/18. She [facility] HFU made:      Sig Hx: Sepsis d/t Pneumonia, COPD    DME:     Conversation: Patient states she is doing fine. States sob on exertion. States she has chronic lower back pain. Patient has Tylenol and Advil on med list. She states she has a little bit of a sore throat. She has lozenges for soothing. Denies fever, chills, cough, congestion or elimination concerns. Patient states she has BLE edema. She states edema is along her sides as well. She says she elevates them and will purchase a pair of compression socks today. Patient is not on a diuretic. Will message PCP. States Appetite good. She states she always uses her walker for safety. States she can bath and dress self. Patient [facility]. Patient states she has a f/u appointment. Not seen in Epic. She agreed to check for a appointment and scheduled if needed. Attempted to schedule appointment and the office was closed. Patient updated. Follow up plan: Will attempt to schedule PCP f/u tomorrow.         Discharge Facility:The [facility] at [address] [place] [address] Transition    Post Acute Facility: The [facility] you have all of your prescriptions and are they filled?: Yes   Do you have any questions related to your medications?: No   Have you scheduled your follow up appointment?: No   Post Acute Services: [address] [place] [address] Fadi Oviedo (Comment: Bob)         Do you feel like you have everything you need to keep you well at home?: Yes   Care Transitions Interventions         Future Appointments   Date Time Provider Department [place]   2023 10:15 AM MD Miguel Angel Fuentes C.D.   2023 12:30 PM MD SUGEY Ceja [facility]   2023 10:00 AM MD SUGEY Ballesteros [facility]   2023 10:00 AM MD Miguel Angel Fuentes C.D.     Transitions of Care Initial Call    Was this an external facility discharge? Yes, 22  Discharge Facility: The [place] at [address] [place] [address] to be reviewed by the provider   Additional needs identified to be addressed with provider: Yes  Patient has BLE and along her sides. She is not on a diuretic. Method of communication with provider : chart routing    Advance Care Planning:   Does patient have an Advance Directive: reviewed and current, reviewed and needs to be updated, not on file; education provided, not on file, patient declined education, decision maker updated, and referral to internal ACP facilitator. Care Transition Nurse contacted the patient by telephone to perform post hospital discharge assessment. Verified name and  with patient as identifiers. Provided introduction to self, and explanation of the CTN role. CTN reviewed discharge instructions, medical action plan and red flags with patient who verbalized understanding. Patient given an opportunity to ask questions and does not have any further questions or concerns at this time. Were discharge instructions available to patient? Yes. Reviewed appropriate site of care based on symptoms and resources available to patient including: PCP  Home health  When to call 911. The patient agrees to contact the PCP office for questions related to their healthcare. Medication reconciliation was performed with patient, who verbalizes understanding of administration of home medications. Advised obtaining a 90-day supply of all daily and as-needed medications. Was patient discharged with a pulse oximeter? no    CTN provided contact information.  Plan for follow-up call in 3-5 days based on severity of symptoms and risk factors. Plan for next call: symptom management-Swelling,   New or worsening concerns.      Gomez Marino RN

## 2022-11-18 ENCOUNTER — CARE COORDINATION (OUTPATIENT)
Dept: CASE MANAGEMENT | Age: 77
End: 2022-11-18

## 2022-12-09 NOTE — TELEPHONE ENCOUNTER
Can you please ask the patient is she is taking Xarelto or not? Apparently the medicine was discontinued because of a fall and brain bleeding.  Thank you

## 2022-12-14 ENCOUNTER — HOSPITAL ENCOUNTER (OUTPATIENT)
Dept: GENERAL RADIOLOGY | Age: 77
Discharge: HOME | End: 2022-12-16
Payer: MEDICARE

## 2022-12-14 ENCOUNTER — HOSPITAL ENCOUNTER (OUTPATIENT)
Age: 77
Discharge: HOME | End: 2022-12-16
Payer: MEDICARE

## 2022-12-14 DIAGNOSIS — J69.0 ASPIRATION PNEUMONIA, UNSPECIFIED ASPIRATION PNEUMONIA TYPE, UNSPECIFIED LATERALITY, UNSPECIFIED PART OF LUNG (HCC): ICD-10-CM

## 2022-12-14 PROCEDURE — 71046 X-RAY EXAM CHEST 2 VIEWS: CPT

## 2022-12-29 ENCOUNTER — HOSPITAL ENCOUNTER (OUTPATIENT)
Age: 77
Discharge: HOME | End: 2022-12-29
Payer: MEDICARE

## 2022-12-29 DIAGNOSIS — R73.01 IMPAIRED FASTING GLUCOSE: ICD-10-CM

## 2022-12-29 DIAGNOSIS — E78.2 MIXED HYPERLIPIDEMIA: ICD-10-CM

## 2022-12-29 DIAGNOSIS — E03.9 HYPOTHYROIDISM, UNSPECIFIED TYPE: ICD-10-CM

## 2022-12-29 LAB
ALT SERPL-CCNC: 17 U/L (ref 5–33)
ANION GAP SERPL CALCULATED.3IONS-SCNC: 8 MMOL/L (ref 9–17)
AST SERPL-CCNC: 23 U/L
BUN BLDV-MCNC: 16 MG/DL (ref 8–23)
BUN/CREAT BLD: 22 (ref 9–20)
CALCIUM SERPL-MCNC: 10.2 MG/DL (ref 8.6–10.4)
CHLORIDE BLD-SCNC: 100 MMOL/L (ref 98–107)
CO2: 31 MMOL/L (ref 20–31)
CREAT SERPL-MCNC: 0.72 MG/DL (ref 0.5–0.9)
GFR SERPL CREATININE-BSD FRML MDRD: >60 ML/MIN/1.73M2
GLUCOSE BLD-MCNC: 93 MG/DL (ref 70–99)
HCT VFR BLD CALC: 43.3 % (ref 36.3–47.1)
HEMOGLOBIN: 14 G/DL (ref 11.9–15.1)
MCH RBC QN AUTO: 29.7 PG (ref 25.2–33.5)
MCHC RBC AUTO-ENTMCNC: 32.3 G/DL (ref 28.4–34.8)
MCV RBC AUTO: 91.9 FL (ref 82.6–102.9)
NRBC AUTOMATED: 0 PER 100 WBC
PDW BLD-RTO: 15.8 % (ref 11.8–14.4)
PLATELET # BLD: 202 K/UL (ref 138–453)
PMV BLD AUTO: 9.3 FL (ref 8.1–13.5)
POTASSIUM SERPL-SCNC: 4 MMOL/L (ref 3.7–5.3)
RBC # BLD: 4.71 M/UL (ref 3.95–5.11)
SODIUM BLD-SCNC: 139 MMOL/L (ref 135–144)
THYROXINE, FREE: 0.74 NG/DL (ref 0.93–1.7)
TSH SERPL DL<=0.05 MIU/L-ACNC: 5.84 UIU/ML (ref 0.3–5)
WBC # BLD: 6.2 K/UL (ref 3.5–11.3)

## 2022-12-29 PROCEDURE — 80061 LIPID PANEL: CPT

## 2022-12-29 PROCEDURE — 84481 FREE ASSAY (FT-3): CPT

## 2022-12-29 PROCEDURE — 83036 HEMOGLOBIN GLYCOSYLATED A1C: CPT

## 2022-12-29 PROCEDURE — 85027 COMPLETE CBC AUTOMATED: CPT

## 2022-12-29 PROCEDURE — 84460 ALANINE AMINO (ALT) (SGPT): CPT

## 2022-12-29 PROCEDURE — 84443 ASSAY THYROID STIM HORMONE: CPT

## 2022-12-29 PROCEDURE — 36415 COLL VENOUS BLD VENIPUNCTURE: CPT

## 2022-12-29 PROCEDURE — 84450 TRANSFERASE (AST) (SGOT): CPT

## 2022-12-29 PROCEDURE — 84439 ASSAY OF FREE THYROXINE: CPT

## 2022-12-29 PROCEDURE — 80048 BASIC METABOLIC PNL TOTAL CA: CPT

## 2022-12-30 LAB
CHOLESTEROL/HDL RATIO: 3
CHOLESTEROL: 249 MG/DL
ESTIMATED AVERAGE GLUCOSE: 111 MG/DL
HBA1C MFR BLD: 5.5 % (ref 4–6)
HDLC SERPL-MCNC: 83 MG/DL
LDL CHOLESTEROL: 148 MG/DL (ref 0–130)
T3 FREE: 2.51 PG/ML (ref 2.02–4.43)
TRIGL SERPL-MCNC: 91 MG/DL

## 2023-01-09 ENCOUNTER — TELEPHONE (OUTPATIENT)
Dept: NEUROSURGERY | Age: 78
End: 2023-01-09

## 2023-01-09 NOTE — TELEPHONE ENCOUNTER
Pt is calling with concerns of \"sunken spots\" in her head. In the front of her head she said it feels like there is no bone there and on the left side she feels a sunken spot. Pt had Sx on 8/23/2022 (Left Craniotomy for Subdural Hematoma Evacuation).  Pt is wondering if this is normal, should she be concerned, does she need to come in?

## 2023-02-06 ENCOUNTER — OFFICE VISIT (OUTPATIENT)
Dept: PULMONOLOGY | Age: 78
End: 2023-02-06
Payer: MEDICARE

## 2023-02-06 VITALS
HEART RATE: 70 BPM | WEIGHT: 158.7 LBS | OXYGEN SATURATION: 91 % | TEMPERATURE: 98.5 F | DIASTOLIC BLOOD PRESSURE: 69 MMHG | HEIGHT: 65 IN | BODY MASS INDEX: 26.44 KG/M2 | SYSTOLIC BLOOD PRESSURE: 126 MMHG | RESPIRATION RATE: 18 BRPM

## 2023-02-06 DIAGNOSIS — K44.9 HIATAL HERNIA: ICD-10-CM

## 2023-02-06 DIAGNOSIS — J84.10 PULMONARY FIBROSIS (HCC): ICD-10-CM

## 2023-02-06 DIAGNOSIS — Z86.16 HISTORY OF COVID-19: ICD-10-CM

## 2023-02-06 DIAGNOSIS — K21.9 GASTROESOPHAGEAL REFLUX DISEASE, UNSPECIFIED WHETHER ESOPHAGITIS PRESENT: ICD-10-CM

## 2023-02-06 DIAGNOSIS — J47.9 BRONCHIECTASIS WITHOUT COMPLICATION (HCC): Primary | ICD-10-CM

## 2023-02-06 PROCEDURE — 1090F PRES/ABSN URINE INCON ASSESS: CPT | Performed by: INTERNAL MEDICINE

## 2023-02-06 PROCEDURE — 1036F TOBACCO NON-USER: CPT | Performed by: INTERNAL MEDICINE

## 2023-02-06 PROCEDURE — G8427 DOCREV CUR MEDS BY ELIG CLIN: HCPCS | Performed by: INTERNAL MEDICINE

## 2023-02-06 PROCEDURE — 99214 OFFICE O/P EST MOD 30 MIN: CPT

## 2023-02-06 PROCEDURE — 1123F ACP DISCUSS/DSCN MKR DOCD: CPT | Performed by: INTERNAL MEDICINE

## 2023-02-06 PROCEDURE — G8417 CALC BMI ABV UP PARAM F/U: HCPCS | Performed by: INTERNAL MEDICINE

## 2023-02-06 PROCEDURE — G8400 PT W/DXA NO RESULTS DOC: HCPCS | Performed by: INTERNAL MEDICINE

## 2023-02-06 PROCEDURE — 99214 OFFICE O/P EST MOD 30 MIN: CPT | Performed by: INTERNAL MEDICINE

## 2023-02-06 PROCEDURE — G8484 FLU IMMUNIZE NO ADMIN: HCPCS | Performed by: INTERNAL MEDICINE

## 2023-02-06 RX ORDER — AZITHROMYCIN 250 MG/1
TABLET, FILM COATED ORAL
Qty: 1 PACKET | Refills: 1 | Status: SHIPPED | OUTPATIENT
Start: 2023-02-06

## 2023-02-06 RX ORDER — HYDROCODONE BITARTRATE AND ACETAMINOPHEN 10; 325 MG/1; MG/1
TABLET ORAL
COMMUNITY
Start: 2023-01-21

## 2023-02-06 RX ORDER — PREDNISONE 10 MG/1
TABLET ORAL
Qty: 30 TABLET | Refills: 1 | Status: SHIPPED | OUTPATIENT
Start: 2023-02-06

## 2023-02-06 RX ORDER — PREGABALIN 300 MG/1
CAPSULE ORAL
COMMUNITY
Start: 2023-01-23

## 2023-02-06 NOTE — PATIENT INSTRUCTIONS
SURVEY:    You may be receiving a survey from [employer] regarding your visit today. Please complete the survey to enable us to provide the highest quality of care to you and your family. If you cannot score us a very good on any question, please call the office to discuss how we could have made your experience a very good one. Thank you.

## 2023-02-06 NOTE — PROGRESS NOTES
PULMONARY OP  PROGRESS NOTE      Patient:  Tejal Chaparro  YOB: 1945    MRN: [id number]     Acct: [de-identified]       Pt seen and Chart reviewed. Tejal Chaparro is here in followup for   1. Bronchiectasis without complication ([address]    2. Hiatal hernia    3. Gastroesophageal reflux disease, unspecified whether esophagitis present    4. History of COVID-19    5. Pulmonary fibrosis ([facility]          Pt  has been hospitalized since last visit for a pneumonia. Has been using meds as recommended.   Denied much of cough or sputum production  Denied any shortness of breath or wheezing  She feels like she may be coming down with a cold  No chest pain or pressure  Her appetite has improved  No fever or chills or night sweats  Not coughing up any blood  No acid reflux symptoms  No orthopnea, PND or increasing pedal edema    Subjective:  Review of Systems    Review of Systems -   General ROS: Completed and except as mentioned above were negative   Psychological ROS:  Completed and except as mentioned above were negative  Ophthalmic ROS:  Completed and except as mentioned above were negative  ENT ROS:  Completed and except as mentioned above were negative  Allergy and Immunology ROS:  Completed and except as mentioned above werenegative  Hematological and Lymphatic ROS:  Completed and except as mentioned above were negative  Endocrine ROS: Completed and except as mentioned above were negative  Respiratory ROS:  Completed and except asmentioned above were negative  Cardiovascular ROS:  Completed and except as mentioned above were negative  Gastrointestinal ROS: Completed and except as mentioned above were negative  Genito-Urinary ROS:  Completedand except as mentioned above were negative  Musculoskeletal ROS:  Completed and except as mentioned above were negative  Neurological ROS:  Completed and except as mentioned above were negative  Dermatological ROS:Completed and except as mentioned above were negative    SLEEP  No epistaxis or sore throat. does not have fatigue, tiredness or sleepiness in am.    No MVA. No edema. Allergies:  No Known Allergies    Medications:    Current Outpatient Medications:     ASPIRIN 81 PO, Take by mouth daily, Disp: , Rfl:     HYDROcodone-acetaminophen (NORCO)  MG per tablet, take 1 tablet by mouth every 8 hours if needed for pain for up to 30 DAYS, Disp: , Rfl:     pregabalin (LYRICA) 300 MG capsule, take 1 capsule by mouth twice a day, Disp: , Rfl:     sertraline (ZOLOFT) 50 MG tablet, take 1 tablet by mouth once daily, Disp: , Rfl:     DULoxetine (CYMBALTA) 60 MG extended release capsule, Take 1 capsule by mouth daily, Disp: 90 capsule, Rfl: 3    rivaroxaban (XARELTO) 20 MG TABS tablet, Take 1 tablet by mouth daily (with breakfast), Disp: 90 tablet, Rfl: 3    citalopram (CELEXA) 10 MG tablet, Take 1 tablet by mouth daily, Disp: 30 tablet, Rfl: 5    bumetanide (BUMEX) 2 MG tablet, Take 1 tablet by mouth daily, Disp: 30 tablet, Rfl: 5    ondansetron (ZOFRAN-ODT) 4 MG disintegrating tablet, Take 4 mg by mouth every 8 hours as needed for Nausea or Vomiting, Disp: , Rfl:     traZODone (DESYREL) 100 MG tablet, Take 200 mg by mouth nightly, Disp: , Rfl:     hydroxychloroquine (PLAQUENIL) 200 MG tablet, Take 300 mg by mouth daily, Disp: , Rfl:     calcium citrate-vitamin D (CITRICAL + D) 315-250 MG-UNIT TABS per tablet, Take 1 tablet by mouth 2 times daily (with meals), Disp: , Rfl:     potassium chloride (KLOR-CON) 20 MEQ packet, Take 20 mEq by mouth 2 times daily, Disp: , Rfl:     alendronate (FOSAMAX) 70 MG tablet, Take 1 tablet by mouth every 7 days On Sundays, Disp: 12 tablet, Rfl: 3    pantoprazole sodium (PROTONIX) 40 MG PACK packet, Take 1 packet by mouth in the morning and 1 packet in the evening. Take before meals. , Disp: 180 each, Rfl: 3    levothyroxine (SYNTHROID) 200 MCG tablet, Take 1 tablet by mouth Daily, Disp: 90 tablet, Rfl: 3    ipratropium-albuterol (DUONEB) 0.5-2.5 (3) MG/3ML SOLN nebulizer solution, 3 mLs every 4 hours as needed, Disp: , Rfl:     umeclidinium-vilanterol (ANORO ELLIPTA) 62.5-25 MCG/INH AEPB inhaler, Inhale 1 puff into the lungs daily, Disp: 3 each, Rfl: 3    albuterol sulfate  (90 Base) MCG/ACT inhaler, Inhale 2 puffs into the lungs 4 times daily, Disp: 3 each, Rfl: 3    amLODIPine (NORVASC) 10 MG tablet, Take 1 tablet by mouth daily (Patient not taking: No sig reported), Disp: 30 tablet, Rfl: 3    benzocaine-menthol (CEPACOL SORE THROAT) 15-3.6 MG lozenge, Take 1 lozenge by mouth every 2 hours as needed for Sore Throat (Patient not taking: No sig reported), Disp: 168 lozenge, Rfl:     levETIRAcetam (KEPPRA) 100 MG/ML solution, Take 10 mLs by mouth 2 times daily (Patient not taking: Reported on 2/6/2023), Disp: , Rfl: 3      Objective:    Physical Exam:  Vitals: /69 (Site: Left Upper Arm, Position: Sitting, Cuff Size: Medium Adult)   Pulse 70   Temp 98.5 °F (36.9 °C) (Temporal)   Resp 18   Ht 5' 5\" (1.651 m)   Wt 158 lb 11.2 oz (72 kg)   SpO2 91%   BMI 26.41 kg/m²   Last 3 weights: Wt Readings from Last 3 Encounters:   02/06/23 158 lb 11.2 oz (72 kg)   01/05/23 162 lb 3.2 oz (73.6 kg)   12/05/22 158 lb 9.6 oz (71.9 kg)     Body mass index is 26.41 kg/m². Physical Examination:   PHYSICAL EXAMINATION:    Vitals:    02/06/23 1242   BP: 126/69   Site: Left Upper Arm   Position: Sitting   Cuff Size: Medium Adult   Pulse: 70   Resp: 18   Temp: 98.5 °F (36.9 °C)   TempSrc: Temporal   SpO2: 91%   Weight: 158 lb 11.2 oz (72 kg)   Height: 5' 5\" (1.651 m)       Constitutional: This is a well developed, well nourished, 25-29.9 - Overweight 69y.o. year old female who is alert, oriented, cooperative and in no apparent distress. Head:normocephalic and atraumatic. EENT:  JP. No conjunctival injections. Septum was midline, mucosa was without erythema, exudates or cobblestoning. No thrush was noted.   MallampatiII (soft palate, uvula, fauces visible)  Neck: Supple without thyromegaly. No elevated JVP. Trachea was midline. Respiratory: Chest was symmetrical without dullness to percussion. Breath sounds bilaterally were clear to auscultation. There were no wheezes, rhonchi or rales. There is no intercostal retraction or use of accessory muscles. No egophony noted. Cardiovascular: Regular without murmur, clicks, gallops or rubs. Abdomen: Slightly rounded and soft without organomegaly. No rebound, rigidity or guarding was appreciated. Lymphatic: No lymphadenopathy. Musculoskeletal: Normal curvature of the spine. No gross muscle weakness. Extremities:  does not have lower extremity edema,  no ulcerations, tenderness, varicosities or erythema. Muscle size, tone and strength are normal.  No involuntary movements are noted. Skin:  Warm and dry. Good color, turgor and pigmentation. No lesions or scars. No cyanosis or clubbing  Neurological/Psychiatric: The patient's general behavior, level of consciousness, thought content and emotional status is normal.          DATA:     Pulmonary function tests: none since last evaluated    CT scan of the chest in April 2022 showed-    Moderate multifocal ground-glass opacities consistent with patient's history   of COVID-19, somewhat improved. Other incidental findings as above. Chest x-ray on October 14, 2022 showed-    Nonspecific reticulonodular airspace opacities could reflect pulmonary edema,   pneumonitis/pneumonia including viral/atypical etiology or combination there   of. Chest x-ray on 12/14/2022 showed-    COPD with interstitial fibrosis. No acute findings. IMPRESSION:   1. Bronchiectasis without complication ([address]    2. Hiatal hernia    3. Gastroesophageal reflux disease, unspecified whether esophagitis present    4. History of COVID-19    5.  Pulmonary fibrosis (HCC)    Regarding pulmonary fibrosis, review of imaging does not show any persistent fibrotic changes               PLAN:       Reviewed the chest x-ray  Refills were provided -prednisone and azithromycin  Patient was recommended to have prednisone and an antibiotic available for use during an exacerbation  Educated and clarified the medication use. Discussed use, benefit, and side effects of prescribed medications. Barriers to medication compliance addressed. Alonso Dia received counseling on the following healthy behaviors: nutrition, exercise and medication adherence  Recommend flu vaccination in the fall annually. Recommendations given regarding pneumococcal vaccinations. Patient had the COVID-19 vaccination including the Omicron booster  Patient is uptodate with vaccinations from pulmonary perspective. Maintain an active lifestyle. continue smoking cessation. Recommend pulmonary rehabilitation. Patient was explained importance of pulmonary rehabilitation with regards to decreasing the hospitalization risk and also help with his dyspnea  Patient was educated on how to use the respiratory medications. All the questions that the patient has had were answered to   her satisfaction. Home O2 evaluation was done. Supplemental oxygen was not indicated  After reviewing the patient's smoking history and her age patient does not meet the criteria for lung cancer screening as she quit smoking more than 15 years ago. We'll see the patient back in 6 months or earlier if needed. Patient will call us if she is sick, so she can be seen sooner. Thank you for having us involved in the care of your patient. Please call us if you have any questions or concerns.           Gus Le MD, MD             2/6/2023, 12:52 PM

## 2023-02-07 ENCOUNTER — OFFICE VISIT (OUTPATIENT)
Dept: CARDIOLOGY | Age: 78
End: 2023-02-07
Payer: MEDICARE

## 2023-02-07 VITALS
WEIGHT: 155.2 LBS | HEIGHT: 65 IN | SYSTOLIC BLOOD PRESSURE: 142 MMHG | OXYGEN SATURATION: 92 % | DIASTOLIC BLOOD PRESSURE: 68 MMHG | BODY MASS INDEX: 25.86 KG/M2 | RESPIRATION RATE: 18 BRPM | HEART RATE: 86 BPM

## 2023-02-07 DIAGNOSIS — Z86.79 HISTORY OF ATRIAL FIBRILLATION: ICD-10-CM

## 2023-02-07 DIAGNOSIS — J44.9 STAGE 3 SEVERE COPD BY GOLD CLASSIFICATION (HCC): ICD-10-CM

## 2023-02-07 DIAGNOSIS — I50.42 CHRONIC COMBINED SYSTOLIC AND DIASTOLIC CONGESTIVE HEART FAILURE (HCC): Primary | ICD-10-CM

## 2023-02-07 DIAGNOSIS — R06.02 SOB (SHORTNESS OF BREATH): ICD-10-CM

## 2023-02-07 DIAGNOSIS — I42.8 NICM (NONISCHEMIC CARDIOMYOPATHY) (HCC): ICD-10-CM

## 2023-02-07 DIAGNOSIS — J18.9 RECURRENT PNEUMONIA: ICD-10-CM

## 2023-02-07 PROCEDURE — 99211 OFF/OP EST MAY X REQ PHY/QHP: CPT | Performed by: INTERNAL MEDICINE

## 2023-02-07 PROCEDURE — G8427 DOCREV CUR MEDS BY ELIG CLIN: HCPCS | Performed by: INTERNAL MEDICINE

## 2023-02-07 PROCEDURE — 1090F PRES/ABSN URINE INCON ASSESS: CPT | Performed by: INTERNAL MEDICINE

## 2023-02-07 PROCEDURE — 3023F SPIROM DOC REV: CPT | Performed by: INTERNAL MEDICINE

## 2023-02-07 PROCEDURE — 1036F TOBACCO NON-USER: CPT | Performed by: INTERNAL MEDICINE

## 2023-02-07 PROCEDURE — G8400 PT W/DXA NO RESULTS DOC: HCPCS | Performed by: INTERNAL MEDICINE

## 2023-02-07 PROCEDURE — 99214 OFFICE O/P EST MOD 30 MIN: CPT | Performed by: INTERNAL MEDICINE

## 2023-02-07 PROCEDURE — G8417 CALC BMI ABV UP PARAM F/U: HCPCS | Performed by: INTERNAL MEDICINE

## 2023-02-07 PROCEDURE — G8484 FLU IMMUNIZE NO ADMIN: HCPCS | Performed by: INTERNAL MEDICINE

## 2023-02-07 PROCEDURE — 1123F ACP DISCUSS/DSCN MKR DOCD: CPT | Performed by: INTERNAL MEDICINE

## 2023-02-07 NOTE — PROGRESS NOTES
Mary Sullivan am scribing for and in the presence of Damon Melo MD, F.A.C.C..    Patient: Shante Ann  : 1945  Date of Visit: 2023    History of Present Illness:        Shelli Mckeon MD    REASON FOR VISIT / CONSULTATION:   Chief Complaint   Patient presents with    Follow-up     HX:CHF,sob,NICM,COPD,AFib Pt is here for yearly follow up she states she has back pain and cold, cardiac she is doing ok. SOB unchanged. Lightheaded denies any falls. Had brain surgery  after fall on  at [facility] V's Denies:CP, palp      Shelli Mckeon MD    I had the pleasure of seeing hSante Ann in my office today. Ms. Jena Grider is a 69 y.o. female who presented for follow-up. She was admitted to [facility] from 10/22/2020 to 2020 because of severe sepsis/septic shock secondary to bilateral pneumonia. She was treated with Rocephin and azithromycin. Hospital course complicated by acute respiratory failure requiring intubation. Patient also found to have severe left ventricular systolic dysfunction with estimated ejection fraction of 30%. She underwent cardiac catheterization that showed no significant CAD. Patient treated for nonischemic cardiomyopathy and was discharged with LifeVest.  Repeat echo in 2021 showed ejection fraction 50%. LifeVest discontinued. Other medical problems include COPD, depression, gastroesophageal reflux disease and osteoporosis. During that same hospital stay, she developed atrial fibrillation with rapid ventricular response on 10/23/2020. Patient was treated with Lopressor, amiodarone in addition to anticoagulation with Xarelto. She was also discharged with home oxygen therapy. Cardiac Catheterization done on 10/22/2020 showed mild CAD. Echocardiogram done on 2020 showed definity echo contrast was used to better assess left ventricular wall motion and thickness.  Mild global hypokinesis with no segmental abnormalities. No evidence of apical thrombus noted on Definity contrast imaging. Compared to the previous study of 6/24/2019, Ejection fraction has improved from 30 to about 50%    EKG done in office on 1/18/2021: Maintaining sinus rhythm. Echo done on 2/1/2022- EF 50-55% The left ventricular cavity size is within normal limits and the left ventricular wall thickness is mildly increased. The left atrium is severely dilated (>40) with a left atrial volume index of 43 ml/m2. Mild mitral and tricuspid regurgitation. Mild pulmonary hypertension with an estimated right ventricular systolic pressure of 32 mmHg. Evidence of mild (grade I) diastolic dysfunction is seen. Ms. Giovani Gómez is here for a follow up. She states cardiac wise she is doing well. She states she was sitting on patio and she thinks she caught her foot on chair and tripped her. She feel hit concrete with head, and had brain surgery on 8/23/2022. She does have chronic pain. She denies having any chest pain, pressure or tightness. She denies having any palpitations. She denies having any abdominal pain, nausea or vomiting. No cough, fever or chills. No bleeding problems, bowel issues, problems with medications or any other concerns at this time. Her weight is stable. She does have home oxygen and uses 2 liters at night. She sleeps well at night. She does wake up once in a while at night. She does have a cold and is being treated at this time. She does follow up with Pulmonology. She is clinically dry with no lower extremity edema and no worsening shortness of breath. No bleeding complications with the blood thinner. I reviewed her most recent blood work.   Kidney function and serum potassium are normal.      PAST MEDICAL HISTORY:        Past Medical History:   Diagnosis Date    A-fib [facility])     Biventricular congestive heart failure (HCC)     Bronchiectasis with acute exacerbation ([address] 04/29/2022    CAD (coronary artery disease)     Chronic pain syndrome     dilaudid infusion pump    COPD (chronic obstructive pulmonary disease) (HCC)     Depression     GERD (gastroesophageal reflux disease)     Hypothyroidism     Impaired fasting glucose     Iron deficiency anemia     Osteoporosis     Pulmonary fibrosis ([address] 04/29/2022    Subdural hematoma 08/23/2022   Nonischemic cardiomyopathy. History of atrial fibrillation. Chronic anticoagulation. Acute on chronic hypoxemic/hypercarbic respiratory failure. CURRENT ALLERGIES: Patient has no known allergies. REVIEW OF SYSTEMS: 14 systems were reviewed. Pertinent positives and negatives as above, all else negative.      Past Surgical History:   Procedure Laterality Date    BACK SURGERY  09/09/1998    spinal cord stimulator    BACK SURGERY  08/04/1999    infusion pump insertion    BACK SURGERY  10/23/2003    spinal cord stimulator removed    BACK SURGERY  10/23/2003    new infusion pump placed    BACK SURGERY  09/11/2017    replaced infusion pump    CARPAL TUNNEL RELEASE Right 12/17/1999    CARPAL TUNNEL RELEASE Left 11/24/1999    CARPAL TUNNEL RELEASE Right 12/30/2002    CARPAL TUNNEL RELEASE Left 12/17/2003    CERVICAL DISC SURGERY  10/25/2004    anterior cervical diskectomy C7-T1    CERVICAL DISC SURGERY  12/22/2004    anterior cervical discectomy F2-4 (complicated by damaged nerve to vocal cord)    CRANIOTOMY Left 8/23/2022    LEFT CRANIOTOMY FOR SUBDURAL HEMATOMA EVACUATION performed by Ramon Eng DO at [address] Left 12/20/2018    ORIF wrist    HUMERUS FRACTURE SURGERY Right 10/03/2010    HYSTERECTOMY (CERVIX STATUS UNKNOWN)  08/20/1991    KNEE ARTHROSCOPY Right 06/02/2011    KNEE SURGERY Right 02/04/2016    repair distal portion of knee arthroplasty due to prosthesis loosening    LUMBAR [address] SURGERY  02/15/1994    due to scar tissue from previous surgery    LUMBAR DISCECTOMY  08/30/1993    L5-S1    LUMBAR LAMINECTOMY  06/09/2008    L3-4-5    NECK SURGERY 2002    posterior plate fusion    NECK SURGERY  2005    reconnect nerve to vocal cord [facility]    TOE AMPUTATION  10/08/2006    left 3rd toe - osteomyelitis    TOE AMPUTATION  2008    left 4th toe partial amputation    TOE AMPUTATION  10/03/2008    left 2nd toe    TOTAL KNEE ARTHROPLASTY Right 2021    WRIST FRACTURE SURGERY Left 2018    WRIST OPEN REDUCTION INTERNAL FIXATION-DISTAL RADIUS performed by Ruby Modi MD at [facility] Left 2018    WRIST SURGERY Left 2019    left wrist ulnar shortening osteotomy    Social History:  Social History     Tobacco Use    Smoking status: Former     Packs/day: 1.00     Years: 10.00     Pack years: 10.00     Types: Cigarettes     Quit date: 1976     Years since quittin.2    Smokeless tobacco: Never   Vaping Use    Vaping Use: Never used   Substance Use Topics    Alcohol use: No    Drug use: No        CURRENT MEDICATIONS:        Outpatient Medications Marked as Taking for the 23 encounter (Office Visit) with Keli Marroquin MD   Medication Sig Dispense Refill    ASPIRIN 81 PO Take by mouth daily      HYDROcodone-acetaminophen (NORCO)  MG per tablet take 1 tablet by mouth every 8 hours if needed for pain for up to 30 DAYS      pregabalin (LYRICA) 300 MG capsule take 1 capsule by mouth twice a day      sertraline (ZOLOFT) 50 MG tablet take 1 tablet by mouth once daily      predniSONE (DELTASONE) 10 MG tablet Tapered dose 40mg x 3 days. .. 30mg x 3 days. .. 20mg x 3 days . Jb Cast .10mg x3days . ..then off 30 tablet 1    azithromycin (ZITHROMAX Z-RAMOS) 250 MG tablet Take 2 tablets (500 mg) on Day 1, and then take 1 tablet (250 mg) on days 2 through 5. 1 packet 1    DULoxetine (CYMBALTA) 60 MG extended release capsule Take 1 capsule by mouth daily 90 capsule 3    rivaroxaban (XARELTO) 20 MG TABS tablet Take 1 tablet by mouth daily (with breakfast) 90 tablet 3    citalopram (CELEXA) 10 MG tablet Take 1 tablet by mouth daily 30 tablet 5    bumetanide (BUMEX) 2 MG tablet Take 1 tablet by mouth daily 30 tablet 5    ondansetron (ZOFRAN-ODT) 4 MG disintegrating tablet Take 4 mg by mouth every 8 hours as needed for Nausea or Vomiting      traZODone (DESYREL) 100 MG tablet Take 200 mg by mouth nightly      hydroxychloroquine (PLAQUENIL) 200 MG tablet Take 300 mg by mouth daily      calcium citrate-vitamin D (CITRICAL + D) 315-250 MG-UNIT TABS per tablet Take 1 tablet by mouth 2 times daily (with meals)      potassium chloride (KLOR-CON) 20 MEQ packet Take 20 mEq by mouth 2 times daily      alendronate (FOSAMAX) 70 MG tablet Take 1 tablet by mouth every 7 days On Sundays 12 tablet 3    pantoprazole sodium (PROTONIX) 40 MG PACK packet Take 1 packet by mouth in the morning and 1 packet in the evening. Take before meals. 180 each 3    levothyroxine (SYNTHROID) 200 MCG tablet Take 1 tablet by mouth Daily 90 tablet 3    ipratropium-albuterol (DUONEB) 0.5-2.5 (3) MG/3ML SOLN nebulizer solution 3 mLs every 4 hours as needed      umeclidinium-vilanterol (ANORO ELLIPTA) 62.5-25 MCG/INH AEPB inhaler Inhale 1 puff into the lungs daily 3 each 3    albuterol sulfate  (90 Base) MCG/ACT inhaler Inhale 2 puffs into the lungs 4 times daily 3 each 3       FAMILY HISTORY: family history includes Heart Attack in her sister; Heart Attack (age of onset: 61) in her father; Heart Disease in her brother. PHYSICAL EXAMINATION:     BP (!) 142/68 (Site: Left Upper Arm, Position: Sitting, Cuff Size: Medium Adult)   Pulse 86   Resp 18   Ht 5' 5\" (1.651 m)   Wt 155 lb 3.2 oz (70.4 kg)   SpO2 92%   BMI 25.83 kg/m²  Body mass index is 25.83 kg/m². Constitutional: She is oriented to person, place, and time. She appears well-developed and well-nourished. In no acute distress. HEENT: Normocephalic and atraumatic. No JVD present. Carotid bruit is not present. No mass and no thyromegaly present.  No lymphadenopathy present. Cardiovascular: Normal rate, regular rhythm, normal heart sounds. Exam reveals no gallop and no friction rubs. 2/6 systolic murmur, 4th intercostal space on the LEFT must lateral to the sternal border. Pulmonary/Chest: Severe kyphosis with poor air entry bilaterally. No significant wheezes or crackles. Scar of previous neck surgery noted. Abdominal: Soft, non-tender. Bowel sounds and aorta are normal. She exhibits no organomegaly, mass or bruit. Extremities: No significant edema. No cyanosis or clubbing. 2+ radial and carotid pulses. Distal extremity pulses: 2+ bilaterally. Neurological: She is alert and oriented to person, place, and time. No evidence of gross cranial nerve deficit. Coordination appeared normal.   Skin: Skin is warm and dry. There is no rash or diaphoresis. Psychiatric: She has a normal mood and affect.  Her speech is normal and behavior is normal.      MOST RECENT LABS ON RECORD:   Lab Results   Component Value Date    WBC 6.2 12/29/2022    HGB 14.0 12/29/2022    HCT 43.3 12/29/2022     12/29/2022    CHOL 249 (H) 12/29/2022    TRIG 91 12/29/2022    HDL 83 12/29/2022    ALT 17 12/29/2022    AST 23 12/29/2022     12/29/2022    K 4.0 12/29/2022     12/29/2022    CREATININE 0.72 12/29/2022    BUN 16 12/29/2022    CO2 31 12/29/2022    TSH 5.84 (H) 12/29/2022    INR 1.0 10/08/2022    LABA1C 5.5 12/29/2022       ASSESSMENT:     Patient Active Problem List    Diagnosis Date Noted    Sepsis due to pneumonia ([address] 10/08/2022    Midline shift of brain due to hematoma (HCC) 08/23/2022    Subdural hematoma 08/22/2022    Fall as cause of accidental injury in home as place of occurrence, initial encounter 08/22/2022    Pulmonary fibrosis ([address] 04/29/2022    A-fib ([address] 02/01/2022    Anemia 10/14/2021    Biventricular congestive heart failure ([address]     COPD (chronic obstructive pulmonary disease) ([address] 10/22/2020    Hypothyroidism 10/05/2018    Major depressive disorder 10/05/2018    Chronic midline low back pain without sciatica 10/05/2018    Iron deficiency anemia 10/05/2018        Diagnosis Orders   1. Chronic combined systolic and diastolic congestive heart failure ([facility]        2. History of atrial fibrillation        3. SOB (shortness of breath)        4. NICM (nonischemic cardiomyopathy) [facility]        5. Stage 3 severe COPD by GOLD classification ([facility]        6. Recurrent pneumonia          PLAN:        Chronic combined heart failure:   Beta Blocker: Currently euvolemic. Ejection fraction is preserved. No significant CAD. No prior history of myocardial infarction. Heart rate is controlled without AV flo blocking agents. She is also a poor candidate for beta-blocker therapy giving her reactive airway disease. ACE Inibitor/ARB: Not indicated at this time. I get a repeat echo today and her ejection fraction is preserved at 50 to 55%. Significantly improved compared to her echo back in October 2021 when she was admitted to [facility] with sepsis and pneumonia. Diuretics: Continue bumetinide (Bumex) 2 mg every morning. Heart failure counseling: I told them to start wearing lower extremity compression stockings and I advised them to try and keep their legs up whenever possible and to limit salt in their diet. Additional Testing List: I ordered a echocardiogram to better assess for the etiology of this problem and to help guide future management     History of Atrial Fibrillation: 10/23/2020 with RVR. Currently maintaining sinus rhythm. Beta Blocker: As above. Stroke Risk: Her CHADS2-VASc score is greater than 2 (2.2% stroke risk)  Anticoagulation: Continue Riveroxiban (Xarelto) 20 mg daily. I also reminded her to watch for signs of blood in her stool or black tarry stools and stop the medication immediately if this develops as this could be life threatening. History of extradural secondary to a fall. No spontaneous bleeding. No further falls. Counseled patient extensively to use her walker or cane and ambulate carefully. Shortness of Breath/COPD:  Chronic hypoxemic respiratory failure. Follow with Pulmonology   She is on oxygen at night at home. And with activity. Atherosclerotic Heart Disease: Mild nonobstructive CAD on prior cardiac cath. Antiplatelet Agent: Continue Aspirin 81 mg daily. Beta Blocker: Not indicated at this time. She has no angina. No history of dyslipidemia. She has never been on a statin therapy. I do not see a long-term benefit of using statins in her condition giving her nonobstructive CAD. Recurrent Pneumonia:  Continue current treatment. Finally, I recommended that she continue her other medications and follow up with you as previously scheduled. FOLLOW UP:   I told Ms. South to call my office if she had any problems, but otherwise I asked her to Return in about 1 year (around 2/7/2024). However, I would be happy to see her sooner should the need arise. Sincerely,  Grady Ag MD, F.A.C.C. [facility] Cardiology Specialist    [address] [place] [address]  Phone: [phone], Fax: [phone]     I believe that the risk of significant morbidity and mortality related to the patient's current medical conditions are: Intermediate. Approximately 40 minutes were spent during prep work, discussion and exam of the patient, and follow up documentation and all of their questions were answered. The documentation recorded by the scribe, accurately and completely reflects the services I personally performed and the decisions made by me. Grady Ag MD, F.A.C.C.  February 7, 2023

## 2023-02-07 NOTE — PATIENT INSTRUCTIONS
SURVEY:    You may be receiving a survey from [employer] regarding your visit today. Please complete the survey to enable us to provide the highest quality of care to you and your family. If you cannot score us a very good on any question, please call the office to discuss how we could have made your experience a very good one. Thank you.

## 2023-02-17 ENCOUNTER — HOSPITAL ENCOUNTER (OUTPATIENT)
Age: 78
Discharge: HOME | End: 2023-02-17
Payer: MEDICARE

## 2023-02-17 DIAGNOSIS — E03.9 HYPOTHYROIDISM, UNSPECIFIED TYPE: ICD-10-CM

## 2023-02-17 LAB
T3FREE SERPL-MCNC: 2.02 PG/ML (ref 2.02–4.43)
T4 FREE SERPL-MCNC: 1.11 NG/DL (ref 0.93–1.7)
TSH SERPL-ACNC: 1.08 UIU/ML (ref 0.3–5)

## 2023-02-17 PROCEDURE — 84443 ASSAY THYROID STIM HORMONE: CPT

## 2023-02-17 PROCEDURE — 36415 COLL VENOUS BLD VENIPUNCTURE: CPT

## 2023-02-17 PROCEDURE — 84481 FREE ASSAY (FT-3): CPT

## 2023-02-17 PROCEDURE — 84439 ASSAY OF FREE THYROXINE: CPT

## 2023-02-22 ENCOUNTER — APPOINTMENT (OUTPATIENT)
Dept: GENERAL RADIOLOGY | Age: 78
End: 2023-02-22
Payer: MEDICARE

## 2023-02-22 ENCOUNTER — HOSPITAL ENCOUNTER (EMERGENCY)
Age: 78
Discharge: ANOTHER HOSPITAL | End: 2023-02-22
Attending: EMERGENCY MEDICINE
Payer: MEDICARE

## 2023-02-22 ENCOUNTER — HOSPITAL ENCOUNTER (INPATIENT)
Age: 78
LOS: 3 days | Discharge: HOSPICE | End: 2023-02-25
Attending: INTERNAL MEDICINE | Admitting: INTERNAL MEDICINE
Payer: MEDICARE

## 2023-02-22 VITALS
RESPIRATION RATE: 18 BRPM | TEMPERATURE: 99.3 F | SYSTOLIC BLOOD PRESSURE: 100 MMHG | DIASTOLIC BLOOD PRESSURE: 47 MMHG | OXYGEN SATURATION: 99 % | HEART RATE: 78 BPM

## 2023-02-22 DIAGNOSIS — J44.1 COPD EXACERBATION (HCC): ICD-10-CM

## 2023-02-22 DIAGNOSIS — R06.03 ACUTE RESPIRATORY DISTRESS: Primary | ICD-10-CM

## 2023-02-22 PROBLEM — J96.21 ACUTE ON CHRONIC RESPIRATORY FAILURE WITH HYPOXIA (HCC): Status: ACTIVE | Noted: 2023-02-22

## 2023-02-22 PROBLEM — R06.00 DYSPNEA, UNSPECIFIED: Status: ACTIVE | Noted: 2021-10-14

## 2023-02-22 PROBLEM — Z86.79 HISTORY OF SUBDURAL HEMATOMA: Status: ACTIVE | Noted: 2022-08-22

## 2023-02-22 PROBLEM — Z99.81 OXYGEN DEPENDENT: Status: ACTIVE | Noted: 2023-02-22

## 2023-02-22 PROBLEM — J18.9 MULTIFOCAL PNEUMONIA: Status: ACTIVE | Noted: 2023-02-22

## 2023-02-22 LAB
ABSOLUTE EOS #: 0.08 K/UL (ref 0–0.44)
ABSOLUTE IMMATURE GRANULOCYTE: 0.03 K/UL (ref 0–0.3)
ABSOLUTE LYMPH #: 1.03 K/UL (ref 1.1–3.7)
ABSOLUTE MONO #: 0.19 K/UL (ref 0.1–1.2)
ALBUMIN SERPL-MCNC: 4.1 G/DL (ref 3.5–5.2)
ALBUMIN/GLOBULIN RATIO: 1.5 (ref 1–2.5)
ALP SERPL-CCNC: 77 U/L (ref 35–104)
ALT SERPL-CCNC: 14 U/L (ref 5–33)
ANION GAP SERPL CALCULATED.3IONS-SCNC: 10 MMOL/L (ref 9–17)
AST SERPL-CCNC: 20 U/L
BASOPHILS # BLD: 0 % (ref 0–2)
BASOPHILS ABSOLUTE: 0.03 K/UL (ref 0–0.2)
BILIRUB SERPL-MCNC: 0.3 MG/DL (ref 0.3–1.2)
BNP SERPL-MCNC: 79 PG/ML
BUN SERPL-MCNC: 15 MG/DL (ref 8–23)
BUN/CREAT BLD: 19 (ref 9–20)
CALCIUM SERPL-MCNC: 9.1 MG/DL (ref 8.6–10.4)
CHLORIDE SERPL-SCNC: 103 MMOL/L (ref 98–107)
CO2 SERPL-SCNC: 27 MMOL/L (ref 20–31)
CREAT SERPL-MCNC: 0.77 MG/DL (ref 0.5–0.9)
CRP SERPL HS-MCNC: 76.2 MG/L (ref 0–5)
EOSINOPHILS RELATIVE PERCENT: 1 % (ref 1–4)
FLUAV AG SPEC QL: NEGATIVE
FLUBV AG SPEC QL: NEGATIVE
GFR SERPL CREATININE-BSD FRML MDRD: >60 ML/MIN/1.73M2
GLUCOSE SERPL-MCNC: 98 MG/DL (ref 70–99)
HCO3 VENOUS: 25.7 MMOL/L (ref 24–30)
HCO3 VENOUS: 27.9 MMOL/L (ref 24–30)
HCT VFR BLD AUTO: 48.3 % (ref 36.3–47.1)
HGB BLD-MCNC: 16 G/DL (ref 11.9–15.1)
IMMATURE GRANULOCYTES: 0 %
LACTIC ACID, SEPSIS: 3 MMOL/L (ref 0.5–1.9)
LACTIC ACID, SEPSIS: 3 MMOL/L (ref 0.5–1.9)
LYMPHOCYTES # BLD: 9 % (ref 24–43)
MCH RBC QN AUTO: 31.2 PG (ref 25.2–33.5)
MCHC RBC AUTO-ENTMCNC: 33.1 G/DL (ref 28.4–34.8)
MCV RBC AUTO: 94.2 FL (ref 82.6–102.9)
MONOCYTES # BLD: 2 % (ref 3–12)
NEGATIVE BASE EXCESS, VEN: 1.5 MMOL/L (ref 0–2)
NRBC AUTOMATED: 0 PER 100 WBC
O2 DEVICE/FLOW/%: ABNORMAL
O2 DEVICE/FLOW/%: ABNORMAL
O2 SAT, VEN: 55.8 % (ref 60–85)
O2 SAT, VEN: 91.8 % (ref 60–85)
PATIENT TEMP: 37
PATIENT TEMP: 37
PCO2, VEN: 52.8 MM HG (ref 39–55)
PCO2, VEN: 55.6 MM HG (ref 39–55)
PDW BLD-RTO: 15 % (ref 11.8–14.4)
PH VENOUS: 7.3 (ref 7.32–7.42)
PH VENOUS: 7.32 (ref 7.32–7.42)
PLATELET # BLD AUTO: 206 K/UL (ref 138–453)
PMV BLD AUTO: 9.6 FL (ref 8.1–13.5)
PO2, VEN: 32.2 MM HG (ref 30–50)
PO2, VEN: 68.5 MM HG (ref 30–50)
POSITIVE BASE EXCESS, VEN: 0.5 MMOL/L (ref 0–2)
POTASSIUM SERPL-SCNC: 3.7 MMOL/L (ref 3.7–5.3)
PROCALCITONIN SERPL-MCNC: 3.42 NG/ML
PROT SERPL-MCNC: 6.9 G/DL (ref 6.4–8.3)
PT. POSITION: ABNORMAL
PT. POSITION: ABNORMAL
RBC # BLD: 5.13 M/UL (ref 3.95–5.11)
SARS-COV-2 RDRP RESP QL NAA+PROBE: NOT DETECTED
SEG NEUTROPHILS: 88 % (ref 36–65)
SEGMENTED NEUTROPHILS ABSOLUTE COUNT: 10.77 K/UL (ref 1.5–8.1)
SODIUM SERPL-SCNC: 140 MMOL/L (ref 135–144)
SPECIMEN DESCRIPTION: NORMAL
TROPONIN I SERPL DL<=0.01 NG/ML-MCNC: 17 NG/L (ref 0–14)
WBC # BLD AUTO: 12.1 K/UL (ref 3.5–11.3)

## 2023-02-22 PROCEDURE — 71045 X-RAY EXAM CHEST 1 VIEW: CPT

## 2023-02-22 PROCEDURE — 87804 INFLUENZA ASSAY W/OPTIC: CPT

## 2023-02-22 PROCEDURE — 94660 CPAP INITIATION&MGMT: CPT

## 2023-02-22 PROCEDURE — 83605 ASSAY OF LACTIC ACID: CPT

## 2023-02-22 PROCEDURE — 6360000002 HC RX W HCPCS: Performed by: NURSE PRACTITIONER

## 2023-02-22 PROCEDURE — 2700000000 HC OXYGEN THERAPY PER DAY

## 2023-02-22 PROCEDURE — 6360000002 HC RX W HCPCS: Performed by: EMERGENCY MEDICINE

## 2023-02-22 PROCEDURE — 2580000003 HC RX 258: Performed by: NURSE PRACTITIONER

## 2023-02-22 PROCEDURE — 86140 C-REACTIVE PROTEIN: CPT

## 2023-02-22 PROCEDURE — 84484 ASSAY OF TROPONIN QUANT: CPT

## 2023-02-22 PROCEDURE — 51702 INSERT TEMP BLADDER CATH: CPT

## 2023-02-22 PROCEDURE — 96361 HYDRATE IV INFUSION ADD-ON: CPT

## 2023-02-22 PROCEDURE — 94640 AIRWAY INHALATION TREATMENT: CPT

## 2023-02-22 PROCEDURE — 84145 PROCALCITONIN (PCT): CPT

## 2023-02-22 PROCEDURE — 87040 BLOOD CULTURE FOR BACTERIA: CPT

## 2023-02-22 PROCEDURE — 2000000000 HC ICU R&B

## 2023-02-22 PROCEDURE — 2580000003 HC RX 258: Performed by: EMERGENCY MEDICINE

## 2023-02-22 PROCEDURE — 99222 1ST HOSP IP/OBS MODERATE 55: CPT | Performed by: NURSE PRACTITIONER

## 2023-02-22 PROCEDURE — 96367 TX/PROPH/DG ADDL SEQ IV INF: CPT

## 2023-02-22 PROCEDURE — 96375 TX/PRO/DX INJ NEW DRUG ADDON: CPT

## 2023-02-22 PROCEDURE — 93005 ELECTROCARDIOGRAM TRACING: CPT | Performed by: EMERGENCY MEDICINE

## 2023-02-22 PROCEDURE — 80053 COMPREHEN METABOLIC PANEL: CPT

## 2023-02-22 PROCEDURE — 94761 N-INVAS EAR/PLS OXIMETRY MLT: CPT

## 2023-02-22 PROCEDURE — 6360000002 HC RX W HCPCS

## 2023-02-22 PROCEDURE — 85025 COMPLETE CBC W/AUTO DIFF WBC: CPT

## 2023-02-22 PROCEDURE — 99285 EMERGENCY DEPT VISIT HI MDM: CPT

## 2023-02-22 PROCEDURE — 6370000000 HC RX 637 (ALT 250 FOR IP): Performed by: NURSE PRACTITIONER

## 2023-02-22 PROCEDURE — 96365 THER/PROPH/DIAG IV INF INIT: CPT

## 2023-02-22 PROCEDURE — 83880 ASSAY OF NATRIURETIC PEPTIDE: CPT

## 2023-02-22 PROCEDURE — 82805 BLOOD GASES W/O2 SATURATION: CPT

## 2023-02-22 PROCEDURE — 36415 COLL VENOUS BLD VENIPUNCTURE: CPT

## 2023-02-22 PROCEDURE — 6370000000 HC RX 637 (ALT 250 FOR IP): Performed by: EMERGENCY MEDICINE

## 2023-02-22 PROCEDURE — 87635 SARS-COV-2 COVID-19 AMP PRB: CPT

## 2023-02-22 RX ORDER — LEVOTHYROXINE SODIUM 0.1 MG/1
200 TABLET ORAL DAILY
Status: DISCONTINUED | OUTPATIENT
Start: 2023-02-23 | End: 2023-02-25 | Stop reason: HOSPADM

## 2023-02-22 RX ORDER — SODIUM CHLORIDE 9 MG/ML
INJECTION, SOLUTION INTRAVENOUS PRN
Status: DISCONTINUED | OUTPATIENT
Start: 2023-02-22 | End: 2023-02-25 | Stop reason: HOSPADM

## 2023-02-22 RX ORDER — ALBUTEROL SULFATE 90 UG/1
2 AEROSOL, METERED RESPIRATORY (INHALATION) 4 TIMES DAILY
Status: DISCONTINUED | OUTPATIENT
Start: 2023-02-22 | End: 2023-02-23

## 2023-02-22 RX ORDER — SODIUM CHLORIDE 9 MG/ML
INJECTION, SOLUTION INTRAVENOUS CONTINUOUS
Status: DISCONTINUED | OUTPATIENT
Start: 2023-02-22 | End: 2023-02-24

## 2023-02-22 RX ORDER — ALBUTEROL SULFATE 2.5 MG/3ML
2.5 SOLUTION RESPIRATORY (INHALATION) ONCE
Status: COMPLETED | OUTPATIENT
Start: 2023-02-22 | End: 2023-02-22

## 2023-02-22 RX ORDER — PREGABALIN 100 MG/1
300 CAPSULE ORAL 2 TIMES DAILY
Status: DISCONTINUED | OUTPATIENT
Start: 2023-02-22 | End: 2023-02-25 | Stop reason: HOSPADM

## 2023-02-22 RX ORDER — ALBUTEROL SULFATE 2.5 MG/3ML
SOLUTION RESPIRATORY (INHALATION)
Status: COMPLETED
Start: 2023-02-22 | End: 2023-02-22

## 2023-02-22 RX ORDER — AZITHROMYCIN 250 MG/1
500 TABLET, FILM COATED ORAL EVERY 24 HOURS
Status: COMPLETED | OUTPATIENT
Start: 2023-02-22 | End: 2023-02-24

## 2023-02-22 RX ORDER — ACETAMINOPHEN 325 MG/1
650 TABLET ORAL EVERY 6 HOURS PRN
Status: DISCONTINUED | OUTPATIENT
Start: 2023-02-22 | End: 2023-02-25 | Stop reason: HOSPADM

## 2023-02-22 RX ORDER — ALBUTEROL SULFATE 2.5 MG/3ML
2.5 SOLUTION RESPIRATORY (INHALATION)
Status: DISCONTINUED | OUTPATIENT
Start: 2023-02-22 | End: 2023-02-23

## 2023-02-22 RX ORDER — SODIUM CHLORIDE 0.9 % (FLUSH) 0.9 %
10 SYRINGE (ML) INJECTION PRN
Status: DISCONTINUED | OUTPATIENT
Start: 2023-02-22 | End: 2023-02-25 | Stop reason: HOSPADM

## 2023-02-22 RX ORDER — ACETAMINOPHEN 650 MG/1
650 SUPPOSITORY RECTAL EVERY 6 HOURS PRN
Status: DISCONTINUED | OUTPATIENT
Start: 2023-02-22 | End: 2023-02-25 | Stop reason: HOSPADM

## 2023-02-22 RX ORDER — TRAZODONE HYDROCHLORIDE 100 MG/1
200 TABLET ORAL NIGHTLY
Status: DISCONTINUED | OUTPATIENT
Start: 2023-02-23 | End: 2023-02-25 | Stop reason: HOSPADM

## 2023-02-22 RX ORDER — ACETAMINOPHEN 650 MG/1
650 SUPPOSITORY RECTAL ONCE
Status: COMPLETED | OUTPATIENT
Start: 2023-02-22 | End: 2023-02-22

## 2023-02-22 RX ORDER — CALCIUM CARBONATE/VITAMIN D3 600 MG-10
1 TABLET ORAL 2 TIMES DAILY WITH MEALS
Status: DISCONTINUED | OUTPATIENT
Start: 2023-02-22 | End: 2023-02-25 | Stop reason: HOSPADM

## 2023-02-22 RX ORDER — ONDANSETRON 4 MG/1
4 TABLET, ORALLY DISINTEGRATING ORAL EVERY 8 HOURS PRN
Status: DISCONTINUED | OUTPATIENT
Start: 2023-02-22 | End: 2023-02-25 | Stop reason: HOSPADM

## 2023-02-22 RX ORDER — ONDANSETRON 2 MG/ML
4 INJECTION INTRAMUSCULAR; INTRAVENOUS EVERY 6 HOURS PRN
Status: DISCONTINUED | OUTPATIENT
Start: 2023-02-22 | End: 2023-02-25 | Stop reason: HOSPADM

## 2023-02-22 RX ORDER — 0.9 % SODIUM CHLORIDE 0.9 %
1000 INTRAVENOUS SOLUTION INTRAVENOUS ONCE
Status: COMPLETED | OUTPATIENT
Start: 2023-02-22 | End: 2023-02-22

## 2023-02-22 RX ORDER — LORAZEPAM 2 MG/ML
0.5 INJECTION INTRAMUSCULAR ONCE
Status: COMPLETED | OUTPATIENT
Start: 2023-02-22 | End: 2023-02-22

## 2023-02-22 RX ORDER — SODIUM CHLORIDE 0.9 % (FLUSH) 0.9 %
5-40 SYRINGE (ML) INJECTION EVERY 12 HOURS SCHEDULED
Status: DISCONTINUED | OUTPATIENT
Start: 2023-02-22 | End: 2023-02-25 | Stop reason: HOSPADM

## 2023-02-22 RX ORDER — PANTOPRAZOLE SODIUM 40 MG/1
40 TABLET, DELAYED RELEASE ORAL
Status: DISCONTINUED | OUTPATIENT
Start: 2023-02-23 | End: 2023-02-25 | Stop reason: HOSPADM

## 2023-02-22 RX ORDER — ASPIRIN 81 MG/1
81 TABLET ORAL DAILY
Status: DISCONTINUED | OUTPATIENT
Start: 2023-02-22 | End: 2023-02-25 | Stop reason: HOSPADM

## 2023-02-22 RX ORDER — IPRATROPIUM BROMIDE AND ALBUTEROL SULFATE 2.5; .5 MG/3ML; MG/3ML
1 SOLUTION RESPIRATORY (INHALATION)
Status: DISCONTINUED | OUTPATIENT
Start: 2023-02-22 | End: 2023-02-25

## 2023-02-22 RX ORDER — 0.9 % SODIUM CHLORIDE 0.9 %
500 INTRAVENOUS SOLUTION INTRAVENOUS ONCE
Status: COMPLETED | OUTPATIENT
Start: 2023-02-22 | End: 2023-02-22

## 2023-02-22 RX ORDER — GUAIFENESIN 600 MG/1
600 TABLET, EXTENDED RELEASE ORAL 2 TIMES DAILY
Status: DISCONTINUED | OUTPATIENT
Start: 2023-02-22 | End: 2023-02-25 | Stop reason: HOSPADM

## 2023-02-22 RX ORDER — IPRATROPIUM BROMIDE AND ALBUTEROL SULFATE 2.5; .5 MG/3ML; MG/3ML
3 SOLUTION RESPIRATORY (INHALATION) EVERY 4 HOURS PRN
Status: DISCONTINUED | OUTPATIENT
Start: 2023-02-22 | End: 2023-02-23

## 2023-02-22 RX ADMIN — CEFEPIME 2000 MG: 2 INJECTION, POWDER, FOR SOLUTION INTRAVENOUS at 11:03

## 2023-02-22 RX ADMIN — GUAIFENESIN 600 MG: 600 TABLET ORAL at 21:03

## 2023-02-22 RX ADMIN — ALBUTEROL SULFATE 2.5 MG: 2.5 SOLUTION RESPIRATORY (INHALATION) at 09:21

## 2023-02-22 RX ADMIN — ACETAMINOPHEN 650 MG: 650 SUPPOSITORY RECTAL at 09:54

## 2023-02-22 RX ADMIN — SODIUM CHLORIDE, PRESERVATIVE FREE 10 ML: 5 INJECTION INTRAVENOUS at 20:28

## 2023-02-22 RX ADMIN — SODIUM CHLORIDE 1000 ML: 9 INJECTION, SOLUTION INTRAVENOUS at 09:59

## 2023-02-22 RX ADMIN — SODIUM CHLORIDE 500 ML: 9 INJECTION, SOLUTION INTRAVENOUS at 14:50

## 2023-02-22 RX ADMIN — LORAZEPAM 0.5 MG: 2 INJECTION INTRAMUSCULAR at 11:28

## 2023-02-22 RX ADMIN — VANCOMYCIN HYDROCHLORIDE 1000 MG: 1 INJECTION, POWDER, LYOPHILIZED, FOR SOLUTION INTRAVENOUS at 09:53

## 2023-02-22 RX ADMIN — ALBUTEROL SULFATE 2.5 MG: 2.5 SOLUTION RESPIRATORY (INHALATION) at 09:22

## 2023-02-22 RX ADMIN — SODIUM CHLORIDE 1000 ML: 9 INJECTION, SOLUTION INTRAVENOUS at 12:30

## 2023-02-22 RX ADMIN — CEFTRIAXONE SODIUM 1000 MG: 1 INJECTION, POWDER, FOR SOLUTION INTRAMUSCULAR; INTRAVENOUS at 20:37

## 2023-02-22 RX ADMIN — AZITHROMYCIN MONOHYDRATE 500 MG: 250 TABLET ORAL at 21:03

## 2023-02-22 RX ADMIN — IPRATROPIUM BROMIDE AND ALBUTEROL SULFATE 1 AMPULE: 2.5; .5 SOLUTION RESPIRATORY (INHALATION) at 23:04

## 2023-02-22 RX ADMIN — SODIUM CHLORIDE: 9 INJECTION, SOLUTION INTRAVENOUS at 20:02

## 2023-02-22 ASSESSMENT — ENCOUNTER SYMPTOMS
CHEST TIGHTNESS: 0
CONSTIPATION: 0
DIARRHEA: 0
NAUSEA: 0
ABDOMINAL PAIN: 0
SHORTNESS OF BREATH: 1
VOMITING: 0
COUGH: 1

## 2023-02-22 NOTE — ED NOTES
Pt accepted at [facility].  Waiting for bed      Linda Oris  02/22/23 [id number]

## 2023-02-22 NOTE — ED NOTES
AYAH called back with [facility] CNP for hospitalist on the line, connected call to Dr Erik Rivera  02/22/23 [id number]

## 2023-02-22 NOTE — H&P
[facility]  Office: [address], DO, Madeleine Sep, DO, Melissa Heart, DO, Violeta Solders Blood, DO, Orquidea Ledesma MD, Viraj Robertson MD, Julien Pan MD, Mimi Castañeda MD,  Jae Angeles MD, Cheryl Wagner MD, Saundra Chappell, DO, Hammad Edward MD,  Kyung Christian MD, Toby Shaw MD, Bing Boxer, DO, Nikunj Pate MD, Marianna Burt MD, Miko Suarez, DO, Paolo Guerra MD, Ace Costa MD, David Fields MD, Daisy Jarvisr, MD, Boris Monzon DO, Wil Hartmann MD, Jim Enriquez MD, Ankita Li, CNP,  Magaly Lockhart, CNP, Michaela Pepper, CNP, Jodee Rosa, CNP,  Celestino Godinez, [facility], Lester Robins, CNP, Radha Bustos, CNP, Rubi Carmona, CNP, Jesus Mcneil, CNP, Agustín Rich, CNP, Stuart MoorerDOC, Claudio Espinoza, [facility], Mana Poole, CNP, Kristen Soliss, [facility]    HISTORY AND PHYSICAL EXAMINATION            Date:   2/22/2023  Patient name:  Nancy Hill  Date of admission:  2/22/2023  6:40 PM  MRN:   [id number]  Account:  [de-identified]  YOB: 1945  PCP:    Dheerja Lezama MD  Room:   [facility]/1103-01  Code Status:    Full Code    Chief Complaint:     Shortness of breath    History Obtained From:     patient, electronic medical record    History of Present Illness:     Patient presents to [facility] emergency department with complaints of shortness of breath. Patient states that her increased work of breathing started a few days ago and has progressively worsened. Patient has a significant past medical history of atrial fibrillation, coronary artery disease, COPD, depression, GERD, hypothyroidism, iron deficiency anemia, pulmonary fibrosis, subdural hematoma and is on home O2 at 2 L per nasal cannula. Patient denies any recent fevers, chills, nausea, vomiting and diarrhea. Patient states that she does experience chest discomfort with coughing.   She has a productive cough noted. Patient had attempted to take her different breathing treatments at home without improvement. Patient called 911 and was transported to [facility] emergency room. Patient was given 125 mg of IV Solu-Medrol as well as an albuterol treatment in route. Patient was also placed on CPAP with improving oxygenation. Throughout the emergency room evaluation it was noted that her lactic acid was 3.0. Troponin 17. WBC 12.1. Hemoglobin 16.0. Blood cultures were obtained x2. Rapid flu and COVID swabs were both detected as negative. Venous pH of 7.305. CO2 52.8. PO2 68.5. HCO3 25.7. Chest x-ray shows: Diffuse pulmonary infiltrates concerning for pneumonia. Patient is being admitted for multifocal pneumonia. Patient resting comfortably while on BiPAP. Pulmonology consulted and IV antibiotics initiated.     Past Medical History:     Past Medical History:   Diagnosis Date    A-fib ([facility] [address]     Biventricular congestive heart failure (HCC)     Bronchiectasis with acute exacerbation ([facility] [address] 04/29/2022    CAD (coronary artery disease)     Chronic pain syndrome     dilaudid infusion pump    COPD (chronic obstructive pulmonary disease) (HCC)     Depression     GERD (gastroesophageal reflux disease)     Hypothyroidism     Impaired fasting glucose     Iron deficiency anemia     Osteoporosis     Pulmonary fibrosis ([facility] [address] 04/29/2022    Subdural hematoma 08/23/2022        Past Surgical History:     Past Surgical History:   Procedure Laterality Date    BACK SURGERY  09/09/1998    spinal cord stimulator    BACK SURGERY  08/04/1999    infusion pump insertion    BACK SURGERY  10/23/2003    spinal cord stimulator removed    BACK SURGERY  10/23/2003    new infusion pump placed    BACK SURGERY  09/11/2017    replaced infusion pump    CARPAL TUNNEL RELEASE Right 12/17/1999    CARPAL TUNNEL RELEASE Left 11/24/1999    CARPAL TUNNEL RELEASE Right 12/30/2002    CARPAL TUNNEL RELEASE Left 12/17/2003    CERVICAL DISC SURGERY  10/25/2004    anterior cervical diskectomy C7-T1    CERVICAL DISC SURGERY  12/22/2004    anterior cervical discectomy L5-0 (complicated by damaged nerve to vocal cord)    CRANIOTOMY Left 8/23/2022    LEFT CRANIOTOMY FOR SUBDURAL HEMATOMA EVACUATION performed by Parris Baum DO at [address] [place] [address] Left 12/20/2018    ORIF wrist    HUMERUS FRACTURE SURGERY Right 10/03/2010    HYSTERECTOMY (CERVIX STATUS UNKNOWN)  08/20/1991    KNEE ARTHROSCOPY Right 06/02/2011    KNEE SURGERY Right 02/04/2016    repair distal portion of knee arthroplasty due to prosthesis loosening    LUMBAR [place] SURGERY  02/15/1994    due to scar tissue from previous surgery    LUMBAR DISCECTOMY  08/30/1993    L5-S1    LUMBAR LAMINECTOMY  06/09/2008    L3-4-5    NECK SURGERY  05/03/2002    posterior plate fusion    NECK SURGERY  12/09/2005    reconnect nerve to vocal cord [facility]    TOE AMPUTATION  10/08/2006    left 3rd toe - osteomyelitis    TOE AMPUTATION  03/07/2008    left 4th toe partial amputation    TOE AMPUTATION  10/03/2008    left 2nd toe    TOTAL KNEE ARTHROPLASTY Right 03/19/2021    WRIST FRACTURE SURGERY Left 12/20/2018    WRIST OPEN REDUCTION INTERNAL FIXATION-DISTAL RADIUS performed by Jamarcus Otto MD at [facility] Left 12/20/2018    WRIST SURGERY Left 07/02/2019    left wrist ulnar shortening osteotomy        Medications Prior to Admission:     Prior to Admission medications    Medication Sig Start Date End Date Taking?  Authorizing Provider   NONFORMULARY Pt on pump with Bupivacaine 6.178mg/day, Hydromorphone 3.089mg/day    Historical Provider, MD   ASPIRIN 81 PO Take 81 mg by mouth daily    Historical Provider, MD   HYDROcodone-acetaminophen (NORCO)  MG per tablet take 1 tablet by mouth every 8 hours if needed for pain for up to 30 DAYS 1/21/23   Historical Provider, MD   pregabalin (LYRICA) 300 MG capsule take 1 capsule by mouth twice a day 1/23/23   Historical Provider, MD   predniSONE (DELTASONE) 10 MG tablet Tapered dose 40mg x 3 days. .. 30mg x 3 days. .. 20mg x 3 days . Charlie Renee .10mg x3days . ..then off 2/6/23   Jostin Pandey MD   rivaroxaban Sheila Minium) 20 MG TABS tablet Take 1 tablet by mouth daily (with breakfast) 12/10/22   Marisa Rehman MD   bumetanide [place]) 2 MG tablet Take 1 tablet by mouth daily 11/22/22   Nayla Lynch MD   ondansetron (ZOFRAN-ODT) 4 MG disintegrating tablet Take 4 mg by mouth every 8 hours as needed for Nausea or Vomiting    Historical Provider, MD   traZODone (DESYREL) 100 MG tablet Take 200 mg by mouth nightly    Historical Provider, MD   hydroxychloroquine (PLAQUENIL) 200 MG tablet Take 300 mg by mouth daily    Historical Provider, MD   calcium citrate-vitamin D (CITRICAL + D) 315-250 MG-UNIT TABS per tablet Take 1 tablet by mouth 2 times daily (with meals)    Historical Provider, MD   potassium chloride (KLOR-CON) 20 MEQ packet Take 20 mEq by mouth 2 times daily    Historical Provider, MD   alendronate (FOSAMAX) 70 MG tablet Take 1 tablet by mouth every 7 days On Sundays 8/15/22 2/22/23  Nayla Lynch MD   pantoprazole sodium (PROTONIX) 40 MG PACK packet Take 1 packet by mouth in the morning and 1 packet in the evening. Take before meals. 8/15/22   Nayla Lynch MD   levothyroxine (SYNTHROID) 200 MCG tablet Take 1 tablet by mouth Daily 6/27/22   Nayla Lynch MD   ipratropium-albuterol (Ladona Cipriano) 0.5-2.5 (3) MG/3ML SOLN nebulizer solution 3 mLs every 4 hours as needed 11/24/21   Historical Provider, MD   umeclidinium-vilanterol (ANORO ELLIPTA) 62.5-25 MCG/INH AEPB inhaler Inhale 1 puff into the lungs daily 3/16/22   RYAN Calvillo CNP   albuterol sulfate  (90 Base) MCG/ACT inhaler Inhale 2 puffs into the lungs 4 times daily 3/16/22   RYAN Calvillo CNP        Allergies:     Patient has no known allergies. Social History:     Tobacco:    reports that she quit smoking about 46 years ago.  Her smoking use included cigarettes. She has a 10.00 pack-year smoking history. She has never used smokeless tobacco.  Alcohol:      reports no history of alcohol use. Drug Use:  reports no history of drug use. Family History:     Family History   Problem Relation Age of Onset    Heart Attack Father 61    Heart Attack Sister     Heart Disease Brother        Review of Systems:     Positive and Negative as described in HPI. Review of Systems   Constitutional:  Negative for activity change, chills, fatigue and fever. HENT:  Positive for congestion. Respiratory:  Positive for cough and shortness of breath. Negative for chest tightness. Cardiovascular: Negative. Gastrointestinal:  Negative for abdominal pain, constipation, diarrhea, nausea and vomiting. Endocrine: Negative for cold intolerance and polyuria. Genitourinary:  Negative for difficulty urinating. Musculoskeletal:  Negative for myalgias. Skin:  Negative for wound. Neurological:  Negative for dizziness and numbness. Psychiatric/Behavioral:  Negative for confusion. Physical Exam:   Resp 21   SpO2 100%   Temp (24hrs), Av.4 °F (38 °C), Min:99.3 °F (37.4 °C), Max:101.4 °F (38.6 °C)    No results for input(s): POCGLU in the last 72 hours. No intake or output data in the 24 hours ending 23    Physical Exam  Vitals and nursing note reviewed. Constitutional:       General: She is sleeping. She is not in acute distress. Appearance: Normal appearance. Interventions: Face mask in place. HENT:      Head: Normocephalic. Mouth/Throat:      Mouth: Mucous membranes are dry. Eyes:      Pupils: Pupils are equal, round, and reactive to light. Cardiovascular:      Rate and Rhythm: Normal rate and regular rhythm. Pulses: Normal pulses. Heart sounds: Normal heart sounds. No murmur heard. No friction rub. No gallop. Pulmonary:      Effort: Pulmonary effort is normal. No respiratory distress. Breath sounds: Examination of the right-lower field reveals decreased breath sounds. Examination of the left-lower field reveals decreased breath sounds. Decreased breath sounds present. No wheezing, rhonchi or rales. Comments: BiPAP in place  Abdominal:      General: Bowel sounds are normal. There is no distension. Palpations: Abdomen is soft. Tenderness: There is no abdominal tenderness. There is no guarding. Musculoskeletal:         General: No swelling. Normal range of motion. Cervical back: Normal range of motion. Skin:     General: Skin is warm and dry. Capillary Refill: Capillary refill takes less than 2 seconds. Neurological:      General: No focal deficit present. Mental Status: She is oriented to person, place, and time and easily aroused. Psychiatric:         Mood and Affect: Mood normal.         Behavior: Behavior normal.         Thought Content: Thought content normal.         Judgment: Judgment normal.       Investigations:      Laboratory Testing:  Recent Results (from the past 24 hour(s))   Blood gas, venous    Collection Time: 02/22/23  9:04 AM   Result Value Ref Range    pH, Micheal 7.318 (L) 7.32 - 7.42    pCO2, Micheal 55.6 (H) 39 - 55 mm Hg    pO2, Micheal 32.2 30.0 - 50.0 mm Hg    HCO3, Venous 27.9 24.0 - 30.0 mmol/L    Positive Base Excess, Micheal 0.5 0.0 - 2.0 mmol/L    O2 Sat, Micheal 55.8 (L) 60.0 - 85.0 %    Pt Temp 37.0     O2 Device/Flow/% BIPAP     Pt.  Position SEMI-FOWLERS    CBC with Auto Differential    Collection Time: 02/22/23  9:04 AM   Result Value Ref Range    WBC 12.1 (H) 3.5 - 11.3 k/uL    RBC 5.13 (H) 3.95 - 5.11 m/uL    Hemoglobin 16.0 (H) 11.9 - 15.1 g/dL    Hematocrit 48.3 (H) 36.3 - 47.1 %    MCV 94.2 82.6 - 102.9 fL    MCH 31.2 25.2 - 33.5 pg    MCHC 33.1 28.4 - 34.8 g/dL    RDW 15.0 (H) 11.8 - 14.4 %    Platelets 792 703 - 031 k/uL    MPV 9.6 8.1 - 13.5 fL    NRBC Automated 0.0 0.0 per 100 WBC    Seg Neutrophils 88 (H) 36 - 65 %    Lymphocytes 9 (L) 24 - 43 %    Monocytes 2 (L) 3 - 12 %    Eosinophils % 1 1 - 4 %    Basophils 0 0 - 2 %    Immature Granulocytes 0 0 %    Segs Absolute 10.77 (H) 1.50 - 8.10 k/uL    Absolute Lymph # 1.03 (L) 1.10 - 3.70 k/uL    Absolute Mono # 0.19 0.10 - 1.20 k/uL    Absolute Eos # 0.08 0.00 - 0.44 k/uL    Basophils Absolute 0.03 0.00 - 0.20 k/uL    Absolute Immature Granulocyte 0.03 0.00 - 0.30 k/uL   CMP    Collection Time: 02/22/23  9:04 AM   Result Value Ref Range    Glucose 98 70 - 99 mg/dL    BUN 15 8 - 23 mg/dL    Creatinine 0.77 0.50 - 0.90 mg/dL    Est, Glom Filt Rate >60 >60 mL/min/1.73m2    Bun/Cre Ratio 19 9 - 20    Calcium 9.1 8.6 - 10.4 mg/dL    Sodium 140 135 - 144 mmol/L    Potassium 3.7 3.7 - 5.3 mmol/L    Chloride 103 98 - 107 mmol/L    CO2 27 20 - 31 mmol/L    Anion Gap 10 9 - 17 mmol/L    Alkaline Phosphatase 77 35 - 104 U/L    ALT 14 5 - 33 U/L    AST 20 <32 U/L    Total Bilirubin 0.3 0.3 - 1.2 mg/dL    Total Protein 6.9 6.4 - 8.3 g/dL    Albumin 4.1 3.5 - 5.2 g/dL    Albumin/Globulin Ratio 1.5 1.0 - 2.5   Troponin    Collection Time: 02/22/23  9:04 AM   Result Value Ref Range    Troponin, High Sensitivity 17 (H) 0 - 14 ng/L   Brain Natriuretic Peptide    Collection Time: 02/22/23  9:04 AM   Result Value Ref Range    Pro-BNP 79 <300 pg/mL   Lactate, Sepsis    Collection Time: 02/22/23  9:04 AM   Result Value Ref Range    Lactic Acid, Sepsis 3.0 (H) 0.5 - 1.9 mmol/L   EKG 12 Lead    Collection Time: 02/22/23  9:09 AM   Result Value Ref Range    Ventricular Rate 141 BPM    Atrial Rate 141 BPM    P-R Interval 158 ms    QRS Duration 80 ms    Q-T Interval 258 ms    QTc Calculation (Bazett) 395 ms    P Axis 45 degrees    R Axis -26 degrees    T Axis 68 degrees   COVID-19, Rapid    Collection Time: 02/22/23  9:42 AM    Specimen: Nasopharyngeal Swab   Result Value Ref Range    Specimen Description .NASOPHARYNGEAL SWAB     SARS-CoV-2, Rapid Not Detected Not Detected   Rapid influenza A/B antigens    Collection Time:  02/22/23  9:42 AM    Specimen: Nasopharyngeal   Result Value Ref Range    Flu A Antigen NEGATIVE NEGATIVE    Flu B Antigen NEGATIVE NEGATIVE   Blood gas, venous    Collection Time: 02/22/23 12:20 PM   Result Value Ref Range    pH, Micheal 7.305 (L) 7.32 - 7.42    pCO2, Micheal 52.8 39 - 55 mm Hg    pO2, Micheal 68.5 (H) 30.0 - 50.0 mm Hg    HCO3, Venous 25.7 24.0 - 30.0 mmol/L    Negative Base Excess, Micheal 1.5 0.0 - 2.0 mmol/L    O2 Sat, Micheal 91.8 (H) 60.0 - 85.0 %    Pt Temp 37.0     O2 Device/Flow/% BIPAP     Pt. Position SEMI-FOWLERS    Lactate, Sepsis    Collection Time: 02/22/23  1:58 PM   Result Value Ref Range    Lactic Acid, Sepsis 3.0 (H) 0.5 - 1.9 mmol/L       Imaging/Diagnostics:  XR CHEST PORTABLE    Result Date: 2/22/2023  Diffuse pulmonary infiltrates concerning for pneumonia. Assessment :      Hospital Problems             Last Modified POA    * (Principal) Multifocal pneumonia 2/22/2023 Yes    Pulmonary fibrosis (HCC) (Chronic) 2/22/2023 Yes    History of subdural hematoma 2/22/2023 Yes    Dyspnea, unspecified 2/22/2023 Yes    GERD (gastroesophageal reflux disease) 2/22/2023 Yes    Overview Signed 2/22/2023  1:20 PM by RYAN Ch CNP     Formatting of this note might be different from the original.  POA: Yes  - this is POA and being treated with home medications         Oxygen dependent 2/22/2023 Yes    Acute on chronic respiratory failure with hypoxia ([address] 2/22/2023 Yes    Hypothyroidism (Chronic) 2/22/2023 Yes    Lactic acidosis 2/22/2023 Yes    COPD (chronic obstructive pulmonary disease) ([address] (Chronic) 2/22/2023 Yes    A-fib ([address] 2/22/2023 Yes       Plan:     Patient status inpatient in the  Medical ICU    Multifocal pneumonia with acute on chronic respiratory failure  Pulmonology consulted  BiPAP as needed. Wean as tolerated  IV antibiotics  Gentle IV hydration  Breathing treatments  Chest x-ray in a.m.   Monitor labs  Continuous pulse oximetry monitoring  Pulmonary fibrosis  Continue home medications  GERD  Continue home medications  Hypothyroidism  Continue home medications  Lactic acidosis  IV hydration  Repeat level in a.m. COPD with oxygen dependency  Continue home medications  Administer supplemental O2 as needed. Wean as tolerated  Atrial fibrillation   Continue home medications  Adult diet  Monitor a.m. labs  PT/OT    Consultations:   IP CONSULT TO PULMONOLOGY    Patient is admitted as inpatient status because of co-morbidities listed above, severity of signs and symptoms as outlined, requirement for current medical therapies and most importantly because of direct risk to patient if care not provided in a hospital setting. Expected length of stay > 48 hours. On this date 2/22/2023 I have spent 31 minutes reviewing previous notes, test results and face to face with the patient discussing the diagnosis and importance of compliance with the treatment plan as well as documenting on the day of the visit. At least 50% of the time documented was spent with the patient to provide counseling and/or coordination of care.       RYAN Agustin - CNP  2/22/2023  7:22 PM    Copy sent to Dr. Jorge L Robledo MD

## 2023-02-22 NOTE — ED NOTES
Pt updated on lack of beds at [facility]. V's.   Ok with looking at other facilities     Mere Batista RN  02/22/23 [phone]

## 2023-02-22 NOTE — ED NOTES
Pt  at bedside updated on plan of care and estimated time of  for pt.  Pt remains asleep at this time, pt  with no needs      Juani Theodore RN  02/22/23 [phone]

## 2023-02-22 NOTE — ED NOTES
[facility] called back with Lennox Kohl, CNP from 's, call connected to Dr Holley Counter  02/22/23 [phone]

## 2023-02-22 NOTE — ED PROVIDER NOTES
[facility] ED  EMERGENCY DEPARTMENT ENCOUNTER      Pt Name: Brent Dias  MRN: [id number]  [place] 1945  Date of evaluation: 2/22/2023  Provider: Vaishali Lopez MD    CHIEF COMPLAINT       Chief Complaint   Patient presents with    Shortness of Breath     Brought in by EMS for sob, on CPAP per EMS         HISTORY OF PRESENT ILLNESS   (Location/Symptom, Timing/Onset, Context/Setting, Quality, Duration, Modifying Factors, Severity)  Note limiting factors. Brent Dias is a 69 y.o. female who presents to the emergency department ***     41-year-old female brought to the emergency department by EMS for acute respiratory distress. Patient is on CPAP on arrival.  History obtained primarily by EMS. Per report family members had told EMS that the patient woke up early this morning with difficulty breathing. She does have a history of COPD. Does use oxygen at home. Around 415 she was given an aerosol treatment at home. Patient progressively worsened and EMS was called to her home. Patient was in acute respiratory distress on their initial evaluation. Oxygen saturation was in the 80s. She was placed on CPAP given 125 mg of Solu-Medrol and 1 albuterol aerosol in transit. She reports that her heart rate was greater than 150. Patient improved significantly with CPAP and was treatment. On arrival patient is somnolent but opens her eyes to voice. She remains on CPAP. Oxygen saturation is 90% on CPAP. Denies any localized pain. Nursing Notes were reviewed. REVIEW OF SYSTEMS    (2-9 systems for level 4, 10 or more for level 5)     Review of Systems    Patient on CPAP on arrival.  She does deny any headaches chest pain. No abdominal pain. No nausea or vomiting. Does have chronic pain of her lower extremities.      PAST MEDICAL HISTORY     Past Medical History:   Diagnosis Date    A-fib [facility])     Biventricular congestive heart failure (HCC)     Bronchiectasis with acute exacerbation ([facility] 04/29/2022    CAD (coronary artery disease)     Chronic pain syndrome     dilaudid infusion pump    COPD (chronic obstructive pulmonary disease) ([facility])     Depression     GERD (gastroesophageal reflux disease)     Hypothyroidism     Impaired fasting glucose     Iron deficiency anemia     Osteoporosis     Pulmonary fibrosis ([facility] 04/29/2022    Subdural hematoma 08/23/2022         SURGICAL HISTORY       Past Surgical History:   Procedure Laterality Date    BACK SURGERY  09/09/1998    spinal cord stimulator    BACK SURGERY  08/04/1999    infusion pump insertion    BACK SURGERY  10/23/2003    spinal cord stimulator removed    BACK SURGERY  10/23/2003    new infusion pump placed    BACK SURGERY  09/11/2017    replaced infusion pump    CARPAL TUNNEL RELEASE Right 12/17/1999    CARPAL TUNNEL RELEASE Left 11/24/1999    CARPAL TUNNEL RELEASE Right 12/30/2002    CARPAL TUNNEL RELEASE Left 12/17/2003    CERVICAL [address] SURGERY  10/25/2004    anterior cervical diskectomy C7-T1    CERVICAL DISC SURGERY  12/22/2004    anterior cervical discectomy W4-5 (complicated by damaged nerve to vocal cord)    CRANIOTOMY Left 8/23/2022    LEFT CRANIOTOMY FOR SUBDURAL HEMATOMA EVACUATION performed by Eduardo Gomes DO at [address] [place] [address] Left 12/20/2018    ORIF wrist    HUMERUS FRACTURE SURGERY Right 10/03/2010    HYSTERECTOMY (CERVIX STATUS UNKNOWN)  08/20/1991    KNEE ARTHROSCOPY Right 06/02/2011    KNEE SURGERY Right 02/04/2016    repair distal portion of knee arthroplasty due to prosthesis loosening    LUMBAR DISC SURGERY  02/15/1994    due to scar tissue from previous surgery    LUMBAR DISCECTOMY  08/30/1993    L5-S1    LUMBAR LAMINECTOMY  06/09/2008    L3-4-5    NECK SURGERY  05/03/2002    posterior plate fusion    NECK SURGERY  12/09/2005    reconnect nerve to vocal cord [facility]    TOE AMPUTATION  10/08/2006    left 3rd toe - osteomyelitis    TOE AMPUTATION  03/07/2008    left 4th toe partial amputation    TOE AMPUTATION  10/03/2008    left 2nd toe    TOTAL KNEE ARTHROPLASTY Right 03/19/2021    WRIST FRACTURE SURGERY Left 12/20/2018    WRIST OPEN REDUCTION INTERNAL FIXATION-DISTAL RADIUS performed by Mati Jimenez MD at [address] [place] [address] Left 12/20/2018    WRIST SURGERY Left 07/02/2019    left wrist ulnar shortening osteotomy         CURRENT MEDICATIONS       Previous Medications    ALBUTEROL SULFATE  (90 BASE) MCG/ACT INHALER    Inhale 2 puffs into the lungs 4 times daily    ALENDRONATE (FOSAMAX) 70 MG TABLET    Take 1 tablet by mouth every 7 days On Sundays    AMLODIPINE (NORVASC) 10 MG TABLET    Take 1 tablet by mouth daily    ASPIRIN 81 PO    Take by mouth daily    AZITHROMYCIN (ZITHROMAX Z-RAMOS) 250 MG TABLET    Take 2 tablets (500 mg) on Day 1, and then take 1 tablet (250 mg) on days 2 through 5.     BENZOCAINE-MENTHOL (CEPACOL SORE THROAT) 15-3.6 MG LOZENGE    Take 1 lozenge by mouth every 2 hours as needed for Sore Throat    BUMETANIDE (BUMEX) 2 MG TABLET    Take 1 tablet by mouth daily    CALCIUM CITRATE-VITAMIN D (CITRICAL + D) 315-250 MG-UNIT TABS PER TABLET    Take 1 tablet by mouth 2 times daily (with meals)    CITALOPRAM (CELEXA) 10 MG TABLET    Take 1 tablet by mouth daily    DULOXETINE (CYMBALTA) 60 MG EXTENDED RELEASE CAPSULE    Take 1 capsule by mouth daily    HYDROCODONE-ACETAMINOPHEN (NORCO)  MG PER TABLET    take 1 tablet by mouth every 8 hours if needed for pain for up to 30 DAYS    HYDROXYCHLOROQUINE (PLAQUENIL) 200 MG TABLET    Take 300 mg by mouth daily    IPRATROPIUM-ALBUTEROL (DUONEB) 0.5-2.5 (3) MG/3ML SOLN NEBULIZER SOLUTION    3 mLs every 4 hours as needed    LEVETIRACETAM (KEPPRA) 100 MG/ML SOLUTION    Take 10 mLs by mouth 2 times daily    LEVOTHYROXINE (SYNTHROID) 200 MCG TABLET    Take 1 tablet by mouth Daily    ONDANSETRON (ZOFRAN-ODT) 4 MG DISINTEGRATING TABLET    Take 4 mg by mouth every 8 hours as needed for Nausea or Vomiting    PANTOPRAZOLE SODIUM (PROTONIX) 40 MG PACK PACKET    Take 1 packet by mouth in the morning and 1 packet in the evening. Take before meals. POTASSIUM CHLORIDE (KLOR-CON) 20 MEQ PACKET    Take 20 mEq by mouth 2 times daily    PREDNISONE (DELTASONE) 10 MG TABLET    Tapered dose 40mg x 3 days. .. 30mg x 3 days. .. 20mg x 3 days . Mia Quiver .10mg x3days . ..then off    PREGABALIN (LYRICA) 300 MG CAPSULE    take 1 capsule by mouth twice a day    RIVAROXABAN (XARELTO) 20 MG TABS TABLET    Take 1 tablet by mouth daily (with breakfast)    SERTRALINE (ZOLOFT) 50 MG TABLET    take 1 tablet by mouth once daily    TRAZODONE (DESYREL) 100 MG TABLET    Take 200 mg by mouth nightly    UMECLIDINIUM-VILANTEROL (ANORO ELLIPTA) 62.5-25 MCG/INH AEPB INHALER    Inhale 1 puff into the lungs daily       ALLERGIES     Patient has no known allergies.     FAMILY HISTORY       Family History   Problem Relation Age of Onset    Heart Attack Father 61    Heart Attack Sister     Heart Disease Brother           SOCIAL HISTORY       Social History     Socioeconomic History    Marital status:    Tobacco Use    Smoking status: Former     Packs/day: 1.00     Years: 10.00     Pack years: 10.00     Types: Cigarettes     Quit date: 1976     Years since quittin.3    Smokeless tobacco: Never   Vaping Use    Vaping Use: Never used   Substance and Sexual Activity    Alcohol use: No    Drug use: No     Social Determinants of Health     Financial Resource Strain: Low Risk     Difficulty of Paying Living Expenses: Not hard at all   Food Insecurity: No Food Insecurity    Worried About [facility] in the Last Year: Never true    Ran Out of Food in the Last Year: Never true   Physical Activity: Inactive    Days of Exercise per Week: 0 days    Minutes of Exercise per Session: 0 min       SCREENINGS                        PHYSICAL EXAM    (up to 7 for level 4, 8 or more for level 5)     ED Triage Vitals [23 0904]   BP Temp Temp Source Heart Rate Resp SpO2 Height Weight (!) 154/109 (!) 101.4 °F (38.6 °C) Tympanic (!) 139 26 96 % -- --       Physical Exam  Vitals and nursing note reviewed. Constitutional:       Comments: Patient arrives on CPAP. She is resting with her eyes closed. She is tachypneic respiratory rate 26. Does open her eyes to voice. Nods appropriately to questions. Moves all extremities and follows commands. Oxygen saturation was 90% on CPAP on arrival.  Patient febrile temperature is 38 6. HENT:      Head: Normocephalic and atraumatic. Abdominal:      Palpations: Abdomen is soft. Comments: Right mid and upper quadrant implanted pain pump. DIAGNOSTIC RESULTS     EKG: All EKG's are interpreted by the Emergency Department Physician who either signs or Co-signs this chart in the absence of a cardiologist.    Sinus tachycardia 141 bpm.  Poor baseline. Poor R wave progression. No acute ST segment or T wave findings. Nonspecific EKG without acute findings of infarction. RADIOLOGY:   Non-plain film images such as CT, Ultrasound and MRI are read by the radiologist. Plain radiographic images are visualized and preliminarily interpreted by the emergency physician with the below findings:    ***    Interpretation per the Radiologist below, if available at the time of this note:    XR CHEST PORTABLE    (Results Pending)         ED BEDSIDE ULTRASOUND:   Performed by ED Physician - none    LABS:  Labs Reviewed   CULTURE, BLOOD 1   CULTURE, BLOOD 2   COVID-19, RAPID   RAPID INFLUENZA A/B ANTIGENS   BLOOD GAS, VENOUS   CBC WITH AUTO DIFFERENTIAL   COMPREHENSIVE METABOLIC PANEL   TROPONIN   BRAIN NATRIURETIC PEPTIDE   LACTATE, SEPSIS   LACTATE, SEPSIS       All other labs were within normal range or not returned as of this dictation.     EMERGENCY DEPARTMENT COURSE and DIFFERENTIAL DIAGNOSIS/MDM:   Vitals:    Vitals:    02/22/23 0904   BP: (!) 154/109   Pulse: (!) 139   Resp: 26   Temp: (!) 101.4 °F (38.6 °C)   TempSrc: Tympanic   SpO2: 96% ***    MDM      MIPS  ***     REASSESSMENT          CRITICAL CARE TIME   Total Critical Care time was *** minutes, excluding separately reportable procedures. There was a high probability of clinically significant/life threatening deterioration in the patient's condition which required my urgent intervention. ***    CONSULTS:  None    PROCEDURES:  Unless otherwise noted below, none     Procedures    No LOS Charge filed ***    FINAL IMPRESSION    No diagnosis found. DISPOSITION/PLAN   DISPOSITION        PATIENT REFERRED TO:  No follow-up provider specified. DISCHARGE MEDICATIONS:  New Prescriptions    No medications on file     Controlled Substances Monitoring:     RX Monitoring 12/17/2018   Attestation The Prescription Monitoring Report for this patient was reviewed today. Periodic Controlled Substance Monitoring No signs of potential drug abuse or diversion identified.        (Please note that portions of this note were completed with a voice recognition program.  Efforts were made to edit the dictations but occasionally words are mis-transcribed.)    Fernanda Valdez MD (electronically signed)  Attending Emergency Physician treatment. MIPS       REASSESSMENT          CRITICAL CARE TIME   Total Critical Care time was 30 minutes, excluding separately reportable procedures. There was a high probability of clinically significant/life threatening deterioration in the patient's condition which required my urgent intervention. CONSULTS:  None    PROCEDURES:  Unless otherwise noted below, none     Procedures        FINAL IMPRESSION      1. Acute respiratory distress    2. COPD exacerbation [facility])          DISPOSITION/PLAN   DISPOSITION Decision To Transfer 02/22/2023 11:21:17 AM      PATIENT REFERRED TO:  No follow-up provider specified. DISCHARGE MEDICATIONS:  Discharge Medication List as of 2/22/2023  5:20 PM        Controlled Substances Monitoring:     RX Monitoring 12/17/2018   Attestation The Prescription Monitoring Report for this patient was reviewed today. Periodic Controlled Substance Monitoring No signs of potential drug abuse or diversion identified.        (Please note that portions of this note were completed with a voice recognition program.  Efforts were made to edit the dictations but occasionally words are mis-transcribed.)    Mel Hamman, MD (electronically signed)  Attending Emergency Physician             Mel Hamman, MD  02/27/23 [id number]

## 2023-02-22 NOTE — ED NOTES
Report given to receiving RN at [facility] at this time, all questions answered     Nancy Lawrence RN  02/22/23 2414

## 2023-02-22 NOTE — ED NOTES
Called [facility] for transfer to [facility].  They will page hospitalist. Waiting for call back     Letty Felix  02/22/23 [id number]

## 2023-02-23 ENCOUNTER — APPOINTMENT (OUTPATIENT)
Dept: CT IMAGING | Age: 78
End: 2023-02-23
Attending: INTERNAL MEDICINE
Payer: MEDICARE

## 2023-02-23 ENCOUNTER — APPOINTMENT (OUTPATIENT)
Dept: GENERAL RADIOLOGY | Age: 78
End: 2023-02-23
Attending: INTERNAL MEDICINE
Payer: MEDICARE

## 2023-02-23 LAB
ABSOLUTE EOS #: 0.06 K/UL (ref 0–0.44)
ABSOLUTE IMMATURE GRANULOCYTE: 0.07 K/UL (ref 0–0.3)
ABSOLUTE LYMPH #: 0.99 K/UL (ref 1.1–3.7)
ABSOLUTE MONO #: 0.77 K/UL (ref 0.1–1.2)
ANION GAP SERPL CALCULATED.3IONS-SCNC: 6 MMOL/L (ref 9–17)
BASOPHILS # BLD: 0 % (ref 0–2)
BASOPHILS ABSOLUTE: 0.04 K/UL (ref 0–0.2)
BUN SERPL-MCNC: 14 MG/DL (ref 8–23)
BUN/CREAT BLD: 29 (ref 9–20)
CALCIUM SERPL-MCNC: 8.4 MG/DL (ref 8.6–10.4)
CHLORIDE SERPL-SCNC: 110 MMOL/L (ref 98–107)
CO2 SERPL-SCNC: 25 MMOL/L (ref 20–31)
CREAT SERPL-MCNC: 0.49 MG/DL (ref 0.5–0.9)
EKG ATRIAL RATE: 141 BPM
EKG P AXIS: 45 DEGREES
EKG P-R INTERVAL: 158 MS
EKG Q-T INTERVAL: 258 MS
EKG QRS DURATION: 80 MS
EKG QTC CALCULATION (BAZETT): 395 MS
EKG R AXIS: -26 DEGREES
EKG T AXIS: 68 DEGREES
EKG VENTRICULAR RATE: 141 BPM
EOSINOPHILS RELATIVE PERCENT: 0 % (ref 1–4)
FLUAV AG SPEC QL: NEGATIVE
FLUBV AG SPEC QL: NEGATIVE
GFR SERPL CREATININE-BSD FRML MDRD: >60 ML/MIN/1.73M2
GLUCOSE SERPL-MCNC: 94 MG/DL (ref 70–99)
HCT VFR BLD AUTO: 39 % (ref 36.3–47.1)
HGB BLD-MCNC: 12.2 G/DL (ref 11.9–15.1)
IMMATURE GRANULOCYTES: 0 %
LACTATE PLASV-SCNC: 0.9 MMOL/L (ref 0.5–2.2)
LV EF: 80 %
LVEF MODALITY: NORMAL
LYMPHOCYTES # BLD: 6 % (ref 24–43)
MCH RBC QN AUTO: 29.8 PG (ref 25.2–33.5)
MCHC RBC AUTO-ENTMCNC: 31.3 G/DL (ref 28.4–34.8)
MCV RBC AUTO: 95.4 FL (ref 82.6–102.9)
MONOCYTES # BLD: 4 % (ref 3–12)
NRBC AUTOMATED: 0 PER 100 WBC
PDW BLD-RTO: 15.6 % (ref 11.8–14.4)
PLATELET # BLD AUTO: 159 K/UL (ref 138–453)
PMV BLD AUTO: 9.2 FL (ref 8.1–13.5)
POTASSIUM SERPL-SCNC: 4 MMOL/L (ref 3.7–5.3)
PROCALCITONIN SERPL-MCNC: 1.7 NG/ML
RBC # BLD: 4.09 M/UL (ref 3.95–5.11)
RBC # BLD: ABNORMAL 10*6/UL
SEG NEUTROPHILS: 90 % (ref 36–65)
SEGMENTED NEUTROPHILS ABSOLUTE COUNT: 16.04 K/UL (ref 1.5–8.1)
SODIUM SERPL-SCNC: 141 MMOL/L (ref 135–144)
TROPONIN I SERPL DL<=0.01 NG/ML-MCNC: 14 NG/L (ref 0–14)
WBC # BLD AUTO: 18 K/UL (ref 3.5–11.3)

## 2023-02-23 PROCEDURE — 87804 INFLUENZA ASSAY W/OPTIC: CPT

## 2023-02-23 PROCEDURE — 87205 SMEAR GRAM STAIN: CPT

## 2023-02-23 PROCEDURE — 6370000000 HC RX 637 (ALT 250 FOR IP): Performed by: INTERNAL MEDICINE

## 2023-02-23 PROCEDURE — 6360000002 HC RX W HCPCS: Performed by: NURSE PRACTITIONER

## 2023-02-23 PROCEDURE — 6370000000 HC RX 637 (ALT 250 FOR IP): Performed by: NURSE PRACTITIONER

## 2023-02-23 PROCEDURE — 94640 AIRWAY INHALATION TREATMENT: CPT

## 2023-02-23 PROCEDURE — 71250 CT THORAX DX C-: CPT

## 2023-02-23 PROCEDURE — 97530 THERAPEUTIC ACTIVITIES: CPT

## 2023-02-23 PROCEDURE — 94761 N-INVAS EAR/PLS OXIMETRY MLT: CPT

## 2023-02-23 PROCEDURE — 97162 PT EVAL MOD COMPLEX 30 MIN: CPT

## 2023-02-23 PROCEDURE — 84145 PROCALCITONIN (PCT): CPT

## 2023-02-23 PROCEDURE — 87070 CULTURE OTHR SPECIMN AEROBIC: CPT

## 2023-02-23 PROCEDURE — 83605 ASSAY OF LACTIC ACID: CPT

## 2023-02-23 PROCEDURE — 97116 GAIT TRAINING THERAPY: CPT

## 2023-02-23 PROCEDURE — 71045 X-RAY EXAM CHEST 1 VIEW: CPT

## 2023-02-23 PROCEDURE — 84484 ASSAY OF TROPONIN QUANT: CPT

## 2023-02-23 PROCEDURE — 80048 BASIC METABOLIC PNL TOTAL CA: CPT

## 2023-02-23 PROCEDURE — 99232 SBSQ HOSP IP/OBS MODERATE 35: CPT | Performed by: INTERNAL MEDICINE

## 2023-02-23 PROCEDURE — 2700000000 HC OXYGEN THERAPY PER DAY

## 2023-02-23 PROCEDURE — 36415 COLL VENOUS BLD VENIPUNCTURE: CPT

## 2023-02-23 PROCEDURE — 93306 TTE W/DOPPLER COMPLETE: CPT

## 2023-02-23 PROCEDURE — 6370000000 HC RX 637 (ALT 250 FOR IP): Performed by: CLINICAL NURSE SPECIALIST

## 2023-02-23 PROCEDURE — 2580000003 HC RX 258: Performed by: NURSE PRACTITIONER

## 2023-02-23 PROCEDURE — 51798 US URINE CAPACITY MEASURE: CPT

## 2023-02-23 PROCEDURE — 93010 ELECTROCARDIOGRAM REPORT: CPT | Performed by: FAMILY MEDICINE

## 2023-02-23 PROCEDURE — 97166 OT EVAL MOD COMPLEX 45 MIN: CPT

## 2023-02-23 PROCEDURE — 97112 NEUROMUSCULAR REEDUCATION: CPT

## 2023-02-23 PROCEDURE — 2000000000 HC ICU R&B

## 2023-02-23 PROCEDURE — 85025 COMPLETE CBC W/AUTO DIFF WBC: CPT

## 2023-02-23 PROCEDURE — 97535 SELF CARE MNGMENT TRAINING: CPT

## 2023-02-23 RX ORDER — BUDESONIDE AND FORMOTEROL FUMARATE DIHYDRATE 160; 4.5 UG/1; UG/1
2 AEROSOL RESPIRATORY (INHALATION)
Status: DISCONTINUED | OUTPATIENT
Start: 2023-02-23 | End: 2023-02-25 | Stop reason: HOSPADM

## 2023-02-23 RX ORDER — MONTELUKAST SODIUM 10 MG/1
10 TABLET ORAL NIGHTLY
Status: DISCONTINUED | OUTPATIENT
Start: 2023-02-23 | End: 2023-02-25 | Stop reason: HOSPADM

## 2023-02-23 RX ORDER — HYDROXYCHLOROQUINE SULFATE 200 MG/1
300 TABLET, FILM COATED ORAL DAILY
Status: DISCONTINUED | OUTPATIENT
Start: 2023-02-23 | End: 2023-02-25 | Stop reason: HOSPADM

## 2023-02-23 RX ORDER — POTASSIUM CHLORIDE 750 MG/1
20 CAPSULE, EXTENDED RELEASE ORAL 2 TIMES DAILY
Status: DISCONTINUED | OUTPATIENT
Start: 2023-02-23 | End: 2023-02-25 | Stop reason: HOSPADM

## 2023-02-23 RX ORDER — ALBUTEROL SULFATE 2.5 MG/3ML
2.5 SOLUTION RESPIRATORY (INHALATION) EVERY 4 HOURS PRN
Status: DISCONTINUED | OUTPATIENT
Start: 2023-02-23 | End: 2023-02-25 | Stop reason: HOSPADM

## 2023-02-23 RX ORDER — HYDROCODONE BITARTRATE AND ACETAMINOPHEN 10; 325 MG/1; MG/1
1 TABLET ORAL EVERY 6 HOURS PRN
Status: DISCONTINUED | OUTPATIENT
Start: 2023-02-23 | End: 2023-02-25 | Stop reason: HOSPADM

## 2023-02-23 RX ADMIN — TRAZODONE HYDROCHLORIDE 200 MG: 100 TABLET ORAL at 23:01

## 2023-02-23 RX ADMIN — HYDROCODONE BITARTRATE AND ACETAMINOPHEN 1 TABLET: 10; 325 TABLET ORAL at 16:54

## 2023-02-23 RX ADMIN — GUAIFENESIN 600 MG: 600 TABLET ORAL at 08:26

## 2023-02-23 RX ADMIN — ACETAMINOPHEN 650 MG: 325 TABLET ORAL at 09:29

## 2023-02-23 RX ADMIN — LEVOTHYROXINE SODIUM 200 MCG: 0.1 TABLET ORAL at 08:26

## 2023-02-23 RX ADMIN — GUAIFENESIN 600 MG: 600 TABLET ORAL at 20:36

## 2023-02-23 RX ADMIN — PREGABALIN 300 MG: 100 CAPSULE ORAL at 20:20

## 2023-02-23 RX ADMIN — HYDROCODONE BITARTRATE AND ACETAMINOPHEN 1 TABLET: 10; 325 TABLET ORAL at 22:11

## 2023-02-23 RX ADMIN — AZITHROMYCIN MONOHYDRATE 500 MG: 250 TABLET ORAL at 20:21

## 2023-02-23 RX ADMIN — ASPIRIN 81 MG: 81 TABLET, COATED ORAL at 00:24

## 2023-02-23 RX ADMIN — TRAZODONE HYDROCHLORIDE 200 MG: 100 TABLET ORAL at 00:24

## 2023-02-23 RX ADMIN — SODIUM CHLORIDE, PRESERVATIVE FREE 10 ML: 5 INJECTION INTRAVENOUS at 20:20

## 2023-02-23 RX ADMIN — IPRATROPIUM BROMIDE AND ALBUTEROL SULFATE 1 AMPULE: 2.5; .5 SOLUTION RESPIRATORY (INHALATION) at 07:46

## 2023-02-23 RX ADMIN — RIVAROXABAN 20 MG: 20 TABLET, FILM COATED ORAL at 08:26

## 2023-02-23 RX ADMIN — PREGABALIN 300 MG: 100 CAPSULE ORAL at 01:19

## 2023-02-23 RX ADMIN — PREGABALIN 300 MG: 100 CAPSULE ORAL at 08:26

## 2023-02-23 RX ADMIN — CEFTRIAXONE SODIUM 1000 MG: 1 INJECTION, POWDER, FOR SOLUTION INTRAMUSCULAR; INTRAVENOUS at 20:25

## 2023-02-23 RX ADMIN — Medication 1 TABLET: at 15:48

## 2023-02-23 RX ADMIN — SODIUM CHLORIDE: 9 INJECTION, SOLUTION INTRAVENOUS at 17:54

## 2023-02-23 RX ADMIN — IPRATROPIUM BROMIDE AND ALBUTEROL SULFATE 1 AMPULE: 2.5; .5 SOLUTION RESPIRATORY (INHALATION) at 18:10

## 2023-02-23 RX ADMIN — ASPIRIN 81 MG: 81 TABLET, COATED ORAL at 08:26

## 2023-02-23 RX ADMIN — PANTOPRAZOLE SODIUM 40 MG: 40 TABLET, DELAYED RELEASE ORAL at 08:26

## 2023-02-23 RX ADMIN — POTASSIUM CHLORIDE 20 MEQ: 10 CAPSULE, COATED, EXTENDED RELEASE ORAL at 22:16

## 2023-02-23 RX ADMIN — PANTOPRAZOLE SODIUM 40 MG: 40 TABLET, DELAYED RELEASE ORAL at 15:49

## 2023-02-23 RX ADMIN — IPRATROPIUM BROMIDE AND ALBUTEROL SULFATE 1 AMPULE: 2.5; .5 SOLUTION RESPIRATORY (INHALATION) at 14:45

## 2023-02-23 RX ADMIN — Medication 1 TABLET: at 09:29

## 2023-02-23 RX ADMIN — BUDESONIDE AND FORMOTEROL FUMARATE DIHYDRATE 2 PUFF: 160; 4.5 AEROSOL RESPIRATORY (INHALATION) at 18:10

## 2023-02-23 RX ADMIN — IPRATROPIUM BROMIDE AND ALBUTEROL SULFATE 1 AMPULE: 2.5; .5 SOLUTION RESPIRATORY (INHALATION) at 12:09

## 2023-02-23 RX ADMIN — MONTELUKAST 10 MG: 10 TABLET, FILM COATED ORAL at 20:21

## 2023-02-23 RX ADMIN — HYDROXYCHLOROQUINE SULFATE 300 MG: 200 TABLET ORAL at 17:13

## 2023-02-23 ASSESSMENT — PAIN SCALES - GENERAL
PAINLEVEL_OUTOF10: 3
PAINLEVEL_OUTOF10: 8
PAINLEVEL_OUTOF10: 3
PAINLEVEL_OUTOF10: 8
PAINLEVEL_OUTOF10: 0

## 2023-02-23 ASSESSMENT — PAIN DESCRIPTION - ONSET: ONSET: ON-GOING

## 2023-02-23 ASSESSMENT — PAIN DESCRIPTION - ORIENTATION: ORIENTATION: LOWER

## 2023-02-23 ASSESSMENT — PAIN DESCRIPTION - DESCRIPTORS: DESCRIPTORS: SHARP;SHOOTING

## 2023-02-23 ASSESSMENT — PAIN DESCRIPTION - LOCATION
LOCATION: BACK
LOCATION: HEAD

## 2023-02-23 ASSESSMENT — PAIN DESCRIPTION - FREQUENCY: FREQUENCY: CONTINUOUS

## 2023-02-23 ASSESSMENT — PAIN - FUNCTIONAL ASSESSMENT: PAIN_FUNCTIONAL_ASSESSMENT: ACTIVITIES ARE NOT PREVENTED

## 2023-02-23 NOTE — PROGRESS NOTES
Occupational Therapy  Facility/Department: [facility]  Occupational Therapy Initial Assessment    Name: Christiano Wilson  : 1945  MRN: [id number]  Date of Service: 2023    Discharge Recommendations:  Patient would benefit from continued therapy after discharge     Due to recent hospitalization and medical condition, pt would benefit from additional intermittent skilled therapy at time of discharge. Please refer to the AM-PAC score for current functional status. RN reports patient is medically stable for therapy treatment this date. Chart reviewed prior to treatment and patient is agreeable for therapy. All lines intact and patient positioned comfortably at end of treatment. All patient needs addressed prior to ending therapy session. Patient Diagnosis(es): There were no encounter diagnoses. Past Medical History:  has a past medical history of A-fib ([address] Biventricular congestive heart failure ([address] Bronchiectasis with acute exacerbation ([address] CAD (coronary artery disease), Chronic pain syndrome, COPD (chronic obstructive pulmonary disease) ([address] Depression, GERD (gastroesophageal reflux disease), Hypothyroidism, Impaired fasting glucose, Iron deficiency anemia, Osteoporosis, Pulmonary fibrosis ([address] and Subdural hematoma. Past Surgical History:  has a past surgical history that includes Hysterectomy (1991); Carpal tunnel release (Right, 1999); Total knee arthroplasty (Right, 2021); fracture surgery (Left, 2018); Wrist fracture surgery (Left, 2018); lumbar discectomy (1993); Lumbar disc surgery (02/15/1994); back surgery (1998); back surgery (1999); Carpal tunnel release (Left, 1999); Neck surgery (2002); Carpal tunnel release (Right, 2002); Carpal tunnel release (Left, 2003); back surgery (10/23/2003); back surgery (10/23/2003); Cervical disc surgery (10/25/2004); Cervical disc surgery (2004);  Neck surgery (12/09/2005); Toe amputation (10/08/2006); Toe amputation (03/07/2008); lumbar laminectomy (06/09/2008); Toe amputation (10/03/2008); Humerus fracture surgery (Right, 10/03/2010); Knee arthroscopy (Right, 06/02/2011); back surgery (09/11/2017); knee surgery (Right, 02/04/2016); Wrist fracture surgery (Left, 12/20/2018); Wrist surgery (Left, 07/02/2019); and craniotomy (Left, 8/23/2022). Per H&P: Pt presented to ED on 2/22/23 with complaints of shortness of breath. Pt reported increased work of breathing started a few days ago and has progressively worsened. Pt had attempted to take her different breathing treatments at home without improvement. Patient called 911 and was transported to [facility] emergency room. Chest x-ray showed: Diffuse pulmonary infiltrates concerning for pneumonia. Pt admitted for further medical management of multifocal pneumonia    Assessment   Performance deficits / Impairments: Decreased functional mobility ; Decreased strength;Decreased endurance;Decreased ADL status; Decreased safe awareness;Decreased cognition;Decreased balance;Decreased coordination  Assessment: Pt is limited by SOB, poor activity tolerance, and weakness. Skilled OT is indicated to increase overall IND and safety with self-care and functional tasks to return to OF.   Prognosis: Good  Decision Making: Medium Complexity  REQUIRES OT FOLLOW-UP: Yes  Activity Tolerance  Activity Tolerance: Patient Tolerated treatment well        Plan   Occupational Therapy Plan  Times Per Week: 4-5x per week, 1x per day  Current Treatment Recommendations: Strengthening, Balance training, Functional mobility training, Endurance training, Patient/Caregiver education & training, Safety education & training, Equipment evaluation, education, & procurement, Self-Care / ADL, Positioning     Restrictions  Restrictions/Precautions  Restrictions/Precautions: General Precautions, Fall Risk  Position Activity Restriction  Other position/activity restrictions: Up as tolerated, RUE IV, heels off bed @ all times, 2LO2 via NC, ALARMS    Subjective   General  Chart Reviewed: Yes  Patient assessed for rehabilitation services?: Yes  Family / Caregiver Present: Yes ( and son)  Subjective  Subjective: \"I have to go to the bathroom\"     Social/Functional History  Social/Functional History  Lives With: Spouse  Type of Home: House ([address] [place] [address])  Home Layout: Two level, Able to Live on Main level with bedroom/bathroom  Home Access: Stairs to enter without rails  Entrance Stairs - Number of Steps: 2 steps  Entrance Stairs - Rails: Both  Bathroom Shower/Tub: Tub/Shower unit, Shower chair without back  Bathroom Toilet: Standard  Bathroom Equipment: Grab bars in shower  Bathroom Accessibility: Accessible  Home Equipment: Oxygen, Grab bars, Cane, Walker, rolling (2L at night thru [facility])  Has the patient had two or more falls in the past year or any fall with injury in the past year?: Yes (Pt fell in July, sustained brain bleed & had to have surgery to evacuate the bleed)  Receives Help From: Family  ADL Assistance: [facility] [address]: Independent  Homemaking Responsibilities: Yes  Ambulation Assistance: Independent  Transfer Assistance: Independent  Active : No  Patient's  Info: Spouse and family  Mode of Transportation: Car  Occupation: Retired  Type of Occupation: [address] [place] [address] Street: read, camp       Objective             Observation/Palpation  Posture: Fair  Observation: RUE IV, pt on room air & resting SPO2 was 92%. With activity did drop to 88-89% but with pursed lip breathing quickly stabilized to 91-94%; Pt hyperventilating after amb from BR but SPO2 was above 92% but requested to put back on 2LO2 NC & SPO2 at 97%  Edema: minor It[place] ankles      Safety Devices  Type of Devices: Call light within reach; Chair alarm in place;Gait belt;Patient at risk for falls; Left in chair;Nurse notified    Bed Mobility Training  Bed Mobility Training: No (pt in bedside chair upon arrival and exit)    Transfer Training  Transfer Training: Yes  Overall Level of Assistance: Moderate assistance;Assist X2 (pt VERY impulsive with transfers and required 2 staff assist for safety and inc lines)  Interventions: Verbal cues; Safety awareness training; Tactile cues (MAX cues for assist with lines, RW safety/mgmt, squaring self/AD to surface, controlled sit<>stand, proper hand placement on stable surface, all to inc safety/reduce fall risk)  Sit to Stand: Moderate assistance;Assist X2  Stand to Sit: Moderate assistance;Assist X2  Toilet Transfer: Moderate assistance;Assist X2    Gait  Overall Level of Assistance: Moderate assistance;Assist X2 (Pt completed functional mob to/from toilet. Pt VERY impulsive and required 2 staff assist for safety and to manage lines. Pt on room air upon arrival, Pt ~low 90s at rest, ~91 with functional mob and pt audiably SOB/hyperventilating, pt returned to seated position with 2L O2 placed and SPO2 inc to 98%)  Interventions: Verbal cues; Safety awareness training;Weight shifting training/pressure relief; Tactile cues (MAX cues for upright posture, pacing, pursed lip breathing, scanning, assist with lines, all to inc safety/reduce fall risk)  Assistive Device: Walker, rolling;Gait belt         AROM: Generally decreased, functional (L shoulder ~80, rest WFL)  PROM: Generally decreased, functional  Strength: Grossly decreased, non-functional (4-/5)  Coordination: Within functional limits  Tone: Normal    ADL  Feeding: Setup  Grooming: Stand by assistance (seated)  UE Bathing: Stand by assistance  LE Bathing: Minimal assistance  UE Dressing: Minimal assistance  UE Dressing Skilled Clinical Factors: to adjust hosp gown for modesty  LE Dressing: Moderate assistance  LE Dressing Skilled Clinical Factors: Min A to doff/nataliia underwear while seated on toilet.  Mod A for standing balance to nataliia underwear over hips  Toileting: Minimal assistance;Contact guard assistance  Toileting Skilled Clinical Factors: Min A for clothing mgmt and hygiene       Activity Tolerance  Activity Tolerance: Patient limited by endurance (pt very impulsive with POOR safety awareness)        Vision  Vision: Impaired  Vision Exceptions: Wears glasses at all times  Hearing  Hearing: Within functional limits    Cognition  Overall Cognitive Status: Exceptions  Arousal/Alertness: Appropriate responses to stimuli  Following Commands: Follows all commands without difficulty  Attention Span: Appears intact  Memory: Appears intact  Safety Judgement: Decreased awareness of need for assistance;Decreased awareness of need for safety  Problem Solving: Decreased awareness of errors;Assistance required to identify errors made  Insights: Decreased awareness of deficits  Initiation: Does not require cues  Sequencing: Does not require cues  Cognition Comment: Pt moves very impulsively  Orientation  Overall Orientation Status: Within Functional Limits                    Education Given To: Patient  Education Provided: Role of Therapy; ADL Adaptive Strategies; Fall Prevention Strategies; Plan of Care;Transfer Training;Energy Conservation  Education Provided Comments: OT POC, purpose of OT in acute care, benefits of OOB activity, transfer tech, safety in function  Education Method: Verbal;Demonstration  Barriers to Learning: Cognition  Education Outcome: Continued education needed                        AM-PAC Score        [facility] Inpatient Daily Activity Raw Score: 17 (02/23/23 1447)  AM-PAC Inpatient ADL T-Scale Score : 37.26 (02/23/23 1447)  ADL Inpatient CMS 0-100% Score: 50.11 (02/23/23 1447)  ADL Inpatient CMS G-Code Modifier : CK (02/23/23 1447)         Goals  Short Term Goals  Time Frame for Short Term Goals: by discharge, pt demo  Short Term Goal 1: I/MI for bed mob tech without bed rails/controls  Short Term Goal 2: I/MI ADL transfers and functional mob with AD as needed and Good safety  Short Term Goal 3: I/MI for total body ADLs with AE as needed and Good safety  Short Term Goal 4: increase BUE strength by 1/2 grade to inc IND with self-care and be IND with HEP  Short Term Goal 5: pt/family to be IND with EC/WS, fall prevention tech, pressure relief education, discharge recommendations with use of handouts as needed  Patient Goals   Patient goals : to go home       Therapy Time   Individual Concurrent Group Co-treatment   Time In 1103         Time Out 1150 (+10 chart review)         Minutes 57          Tx time: 47 min    Upon writer exit, call light within reach, pt retired to chair. All lines intact and patient positioned comfortably. All patient needs addressed prior to ending therapy session. Chart reviewed prior to treatment and patient is agreeable for therapy.  RN reports patient is medically stable for therapy treatment this date.    Co-treatment with PT warranted secondary to decreased safety and independence requiring 2 skilled therapy professionals to address individual discipline's goals. OT addressing preparation for ADL transfer, sitting balance for increased ADL performance, sitting/activity tolerance, functional reaching, environmental safety/scanning, fall prevention, functional mobility for ADL transfers, ability to sequence and follow directions, bed mobility tech, and functional UE strength.    Michela Kendall OTR/L

## 2023-02-23 NOTE — RT PROTOCOL NOTE
RT Inhaler-Nebulizer Bronchodilator Protocol Note    There is a bronchodilator order in the chart from a provider indicating to follow the RT Bronchodilator Protocol and there is an Initiate RT Inhaler-Nebulizer Bronchodilator Protocol order as well (see protocol at bottom of note). CXR Findings:  XR CHEST PORTABLE    Result Date: 2/22/2023  Diffuse pulmonary infiltrates concerning for pneumonia. The findings from the last RT Protocol Assessment were as follows:   History Pulmonary Disease: Chronic pulmonary disease  Respiratory Pattern: Mild dyspnea at rest, irregular pattern, or RR 21-25 bpm  Breath Sounds: Intermittent or unilateral wheezes  Cough: Strong, productive  Indication for Bronchodilator Therapy: Wheezing associated with pulm disorder, On home bronchodilators  Bronchodilator Assessment Score: 11    Aerosolized bronchodilator medication orders have been revised according to the RT Inhaler-Nebulizer Bronchodilator Protocol below. Respiratory Therapist to perform RT Therapy Protocol Assessment initially then follow the protocol. Repeat RT Therapy Protocol Assessment PRN for score 0-3 or on second treatment, BID, and PRN for scores above 3. No Indications - adjust the frequency to every 6 hours PRN wheezing or bronchospasm, if no treatments needed after 48 hours then discontinue using Per Protocol order mode. If indication present, adjust the RT bronchodilator orders based on the Bronchodilator Assessment Score as indicated below. Use Inhaler orders unless patient has one or more of the following: on home nebulizer, not able to hold breath for 10 seconds, is not alert and oriented, cannot activate and use MDI correctly, or respiratory rate 25 breaths per minute or more, then use the equivalent nebulizer order(s) with same Frequency and PRN reasons based on the score. If a patient is on this medication at home then do not decrease Frequency below that used at home.     0-3 - enter or revise RT bronchodilator order(s) to equivalent RT Bronchodilator order with Frequency of every 4 hours PRN for wheezing or increased work of breathing using Per Protocol order mode. 4-6 - enter or revise RT Bronchodilator order(s) to two equivalent RT bronchodilator orders with one order with BID Frequency and one order with Frequency of every 4 hours PRN wheezing or increased work of breathing using Per Protocol order mode. 7-10 - enter or revise RT Bronchodilator order(s) to two equivalent RT bronchodilator orders with one order with TID Frequency and one order with Frequency of every 4 hours PRN wheezing or increased work of breathing using Per Protocol order mode. 11-13 - enter or revise RT Bronchodilator order(s) to one equivalent RT bronchodilator order with QID Frequency and an Albuterol order with Frequency of every 4 hours PRN wheezing or increased work of breathing using Per Protocol order mode. Greater than 13 - enter or revise RT Bronchodilator order(s) to one equivalent RT bronchodilator order with every 4 hours Frequency and an Albuterol order with Frequency of every 2 hours PRN wheezing or increased work of breathing using Per Protocol order mode. RT to enter RT Home Evaluation for COPD & MDI Assessment order using Per Protocol order mode.     Electronically signed by Erinn Mckeon RCP on 2/22/2023 at 11:10 PM

## 2023-02-23 NOTE — PROGRESS NOTES
Mo 2  PROGRESS NOTE    Room # 1103/1103-01   Name: Rupesh Mayo            Hinduism: Mandaen     Reason for visit: Routine    I visited the patient. Admit Date & Time: 2/22/2023  6:40 PM    Assessment:  Rupesh Mayo is a 69 y.o. female in the hospital because she has pneumonia. Upon entering the room patient is found resting in bed. She has just been transported from [facility]. Patient is very lethargic. Intervention:  I introduced myself and my title as  I offered space for patient  to express feelings, needs, and concerns and provided a ministry presence. Patient made aware of opportunities for spiritual support for patient and family during her stay at [facility]). Outcome:  Patient calm and coping. Plan:  Chaplains will remain available to offer spiritual and emotional support as needed. Electronically signed by Macrina Aguirre on 2/22/2023 at 8:25 PM.  [place]     02/22/23 2024   Encounter Summary   Service Provided For: Patient   Referral/Consult From: [facility]   Support System Spouse; Children   Last Encounter  02/22/23   Complexity of Encounter Low   Begin Time 2025   End Time  2040   Total Time Calculated 15 min   Encounter    Type Initial Screen/Assessment   Spiritual/Emotional needs   Type Spiritual Support   Assessment/Intervention/Outcome   Assessment Calm;Coping; Impaired resilience; Interrupted family processes   Intervention Active listening;Explored/Affirmed feelings, thoughts, concerns;Nurtured Hope;Sustaining Presence/Ministry of presence   Outcome Comfort;Coping;Expressed feelings, needs, and concerns;Peace

## 2023-02-23 NOTE — CARE COORDINATION
02/23/23 1419   Service Assessment   Patient Orientation Alert and Oriented;Person;Place;Situation;Self   Cognition Alert   History Provided By Patient   Primary Caregiver Self   Support Systems Spouse/Significant Other   Patient's Healthcare Decision Maker is: Legal Next of Kin   PCP Verified by CM Yes   Last Visit to PCP Within last 3 months   Prior Functional Level Independent in ADLs/IADLs   Current Functional Level Independent in ADLs/IADLs   Can patient return to prior living arrangement Yes   Ability to make needs known: Good   Family able to assist with home care needs: Yes   Would you like for me to discuss the discharge plan with any other family members/significant others, and if so, who? Yes  (spouse)   Financial Resources None   Community Resources ECF/Home Care   Social/Functional History   Lives With Spouse   Type of Home House  ([facility] Two level; Able to Live on Main level with bedroom/bathroom   Home Access Stairs to enter without rails   Entrance Stairs - Number of Steps 2 steps   Entrance Stairs - Rails Both   Bathroom Shower/Tub Tub/Shower unit; Shower chair without back   [address] bars in [facility] [address] Oxygen;Grab bars;Cane;Walker, rolling  (2L at night thru [facility])   [address] Independent   Transfer Assistance Independent   Active  No   Patient's  Info Spouse and family   Mode of Transportation Car   Occupation Retired   Discharge Planning   Type of [address] Prior To Admission None   Potential Assistance Needed N/A   DME Ordered?  No   Type of Home Care Services OT;PT;Skilled Therapy   Patient expects to be discharged to: House   Follow Up Appointment: Best Day/Time  Monday AM   One/Two Story Residence Two story, live on first floor   Lift Chair Available No   History of falls? 0   Services At/After Discharge   Transition of Care Consult (CM Consult) [address] [facility]  ([address] [place] [address])   Jhon Provided? No   Mode of Transport at Discharge Other (see comment)  (family)   Confirm Follow Up Transport Family   Condition of Participation: Discharge Planning   The Plan for Transition of Care is related to the following treatment goals: home with [place] care and spouse. The Patient and/or Patient Representative was provided with a Choice of Provider? Patient;Patient Representative   Name of the Patient Representative who was provided with the Choice of Provider and agrees with the Discharge Plan? spouse and son   The Patient and/Or Patient Representative agree with the Discharge Plan? Yes   Freedom of Choice list was provided with basic dialogue that supports the patient's individualized plan of care/goals, treatment preferences, and shares the quality data associated with the providers? Yes   Pneumonia. Patient lives with spouse independently. Has walker, cane and O2 through [facility]. [facility]. Referral sent. Continue to follow for any other needs. Uses [facility] in [place].

## 2023-02-23 NOTE — CONSULTS
Pulmonary Medicine and [address] Me[address] MD      Patient - Stephanie Wright   MRN -  [id number]   [facility] # - [de-identified]   - 1945      Date of Admission -  2023  6:40 PM  Date of evaluation -  2023  Room - [facility] Blood, DO Primary Care Physician - Keisha Justice MD     Reason for Consult    Acute on chronic hypoxic respiratory failure    Assessment   Acute on chronic hypoxic respiratory failure  Acute exacerbation of bronchiectasis  Pulmonary fibrosis  Acute exacerbation of COPD  Bilateral pulm infiltrate, pneumonia    Recommendations   Continue IV antibiotics, Rocephin/Zithromax  IV solu-medrol  Albuterol and Ipratropium Q 4 hours and prn  Start Symbicort  Mucinex  Noncontrast CT of the chest  Echocardiogram  Procalcitonin level  Labs: CBC and BMP in am  BiPAP as needed  2 liters/min via nasal cannula  DVT prophylaxis, on Xarelto  Will follow with you    HPI     Stephanie Wright is 69 y.o.,  female, admitted because of shortness of breath and COPD exacerbation. Patient presented to [facility] emergency room for shortness of breath and increased work of breathing for few days. Patient has history of chronic respiratory failure and is on home oxygen at 2 L. She has history of COPD, bronchiectasis, pulmonary fibrosis and COVID pneumonia in the past. Patient has productive cough with yellow sputum. She denies any hemoptysis. She denies any chest pain. Patient [facility] for further management of her respiratory failure and COPD exacerbation.     PMHx   Past Medical History      Diagnosis Date    A-fib [facility])     Biventricular congestive heart failure (HCC)     Bronchiectasis with acute exacerbation ([facility] [address] 2022    CAD (coronary artery disease)     Chronic pain syndrome     dilaudid infusion pump    COPD (chronic obstructive pulmonary disease) (HCC)     Depression     GERD (gastroesophageal reflux disease)     Hypothyroidism     Impaired fasting glucose     Iron deficiency anemia     Osteoporosis     Pulmonary fibrosis ([address] 04/29/2022    Subdural hematoma 08/23/2022      Past Surgical History        Procedure Laterality Date    BACK SURGERY  09/09/1998    spinal cord stimulator    BACK SURGERY  08/04/1999    infusion pump insertion    BACK SURGERY  10/23/2003    spinal cord stimulator removed    BACK SURGERY  10/23/2003    new infusion pump placed    BACK SURGERY  09/11/2017    replaced infusion pump    CARPAL TUNNEL RELEASE Right 12/17/1999    CARPAL TUNNEL RELEASE Left 11/24/1999    CARPAL TUNNEL RELEASE Right 12/30/2002    CARPAL TUNNEL RELEASE Left 12/17/2003    CERVICAL [address] SURGERY  10/25/2004    anterior cervical diskectomy C7-T1    CERVICAL DISC SURGERY  12/22/2004    anterior cervical discectomy N6-4 (complicated by damaged nerve to vocal cord)    CRANIOTOMY Left 8/23/2022    LEFT CRANIOTOMY FOR SUBDURAL HEMATOMA EVACUATION performed by Jodi Soulier, DO at [address] [place] [address] Left 12/20/2018    ORIF wrist    HUMERUS FRACTURE SURGERY Right 10/03/2010    HYSTERECTOMY (CERVIX STATUS UNKNOWN)  08/20/1991    KNEE ARTHROSCOPY Right 06/02/2011    KNEE SURGERY Right 02/04/2016    repair distal portion of knee arthroplasty due to prosthesis loosening    LUMBAR [address] SURGERY  02/15/1994    due to scar tissue from previous surgery    LUMBAR DISCECTOMY  08/30/1993    L5-S1    LUMBAR LAMINECTOMY  06/09/2008    L3-4-5    NECK SURGERY  05/03/2002    posterior plate fusion    NECK SURGERY  12/09/2005    reconnect nerve to vocal cord [facility]    TOE AMPUTATION  10/08/2006    left 3rd toe - osteomyelitis    TOE AMPUTATION  03/07/2008    left 4th toe partial amputation    TOE AMPUTATION  10/03/2008    left 2nd toe    TOTAL KNEE ARTHROPLASTY Right 03/19/2021    WRIST FRACTURE SURGERY Left 12/20/2018    WRIST OPEN REDUCTION INTERNAL FIXATION-DISTAL RADIUS performed by Tyler Saunders Leta Arias MD at [facility] Left 12/20/2018    WRIST SURGERY Left 07/02/2019    left wrist ulnar shortening osteotomy       Meds    Current Medications    aspirin  81 mg Oral Daily    calcium carb-cholecalciferol  1 tablet Oral BID WC    levothyroxine  200 mcg Oral Daily    pantoprazole  40 mg Oral BID AC    pregabalin  300 mg Oral BID    rivaroxaban  20 mg Oral Daily with breakfast    sodium chloride flush  5-40 mL IntraVENous 2 times per day    ipratropium-albuterol  1 ampule Inhalation Q4H WA    cefTRIAXone (ROCEPHIN) IV  1,000 mg IntraVENous Q24H    And    azithromycin  500 mg Oral Q24H    guaiFENesin  600 mg Oral BID    traZODone  200 mg Oral Nightly     albuterol, sodium chloride flush, sodium chloride, ondansetron **OR** ondansetron, magnesium hydroxide, acetaminophen **OR** acetaminophen  IV Drips/Infusions   sodium chloride 50 mL/hr at 02/23/23 0724    sodium chloride       Home Medications  Medications Prior to Admission: NONFORMULARY, Pt on pump with Bupivacaine 6.178mg/day, Hydromorphone 3.089mg/day  ASPIRIN 81 PO, Take 81 mg by mouth daily  HYDROcodone-acetaminophen (NORCO)  MG per tablet, take 1 tablet by mouth every 8 hours if needed for pain for up to 30 DAYS  pregabalin (LYRICA) 300 MG capsule, take 1 capsule by mouth twice a day  predniSONE (DELTASONE) 10 MG tablet, Tapered dose 40mg x 3 days. .. 30mg x 3 days. .. 20mg x 3 days . Leonard Colon .10mg x3days . ..then off  rivaroxaban (XARELTO) 20 MG TABS tablet, Take 1 tablet by mouth daily (with breakfast)  bumetanide (BUMEX) 2 MG tablet, Take 1 tablet by mouth daily  ondansetron (ZOFRAN-ODT) 4 MG disintegrating tablet, Take 4 mg by mouth every 8 hours as needed for Nausea or Vomiting (Patient not taking: Reported on 2/23/2023)  traZODone (DESYREL) 100 MG tablet, Take 200 mg by mouth nightly  hydroxychloroquine (PLAQUENIL) 200 MG tablet, Take 300 mg by mouth daily  calcium citrate-vitamin D (CITRICAL + D) 315-250 MG-UNIT TABS per tablet, Take 1 tablet by mouth 2 times daily (with meals)  potassium chloride (KLOR-CON) 20 MEQ packet, Take 20 mEq by mouth 2 times daily  alendronate (FOSAMAX) 70 MG tablet, Take 1 tablet by mouth every 7 days On Sundays  pantoprazole sodium (PROTONIX) 40 MG PACK packet, Take 1 packet by mouth in the morning and 1 packet in the evening. Take before meals. levothyroxine (SYNTHROID) 200 MCG tablet, Take 1 tablet by mouth Daily  ipratropium-albuterol (DUONEB) 0.5-2.5 (3) MG/3ML SOLN nebulizer solution, 3 mLs every 4 hours as needed  umeclidinium-vilanterol (ANORO ELLIPTA) 62.5-25 MCG/INH AEPB inhaler, Inhale 1 puff into the lungs daily  albuterol sulfate  (90 Base) MCG/ACT inhaler, Inhale 2 puffs into the lungs 4 times daily    Allergies    Patient has no known allergies. Social History     Social History     Tobacco Use    Smoking status: Former     Packs/day: 1.00     Years: 10.00     Pack years: 10.00     Types: Cigarettes     Quit date: 1976     Years since quittin.3    Smokeless tobacco: Never   Substance Use Topics    Alcohol use: No     Family History          Problem Relation Age of Onset    Heart Attack Father 61    Heart Attack Sister     Heart Disease Brother      ROS - 11 systems   General Denies any fever or chills  HEENT Denies any diplopia, tinnitus or vertigo  Resp positive shortness of breath. Positive for cough. No fever or chest pain  Cardiac Denies any chest pain, palpitations, claudication or edema  GI Denies any melena, hematochezia, hematemesis or pyrosis   Denies any frequency, urgency, hesitancy or incontinence  Heme Denies bruising or bleeding easily  Endocrine Denies any history of diabetes, positive for hypothyroidism and abnormal fasting glucose. Neuro Denies any focal motor or sensory deficits  Psychiatric Denies anxiety, depression, suicidal ideation  Skin Denies rashes, itching, open sores    Vitals     height is 5' 5\" (1.651 m) and weight is 156 lb 15.5 oz (71.2 kg).  Her temporal temperature is 97.3 °F (36.3 °C). Her blood pressure is 89/61 and her pulse is 86. Her respiration is 19 and oxygen saturation is 93%. Body mass index is 26.12 kg/m². I/O      Intake/Output Summary (Last 24 hours) at 2/23/2023 1105  Last data filed at 2/23/2023 0724  Gross per 24 hour   Intake 575.83 ml   Output 850 ml   Net -274.17 ml     I/O last 3 completed shifts:  In: -   Out: 850 [Urine:850]   Patient Vitals for the past 96 hrs (Last 3 readings):   Weight   02/22/23 2120 156 lb 15.5 oz (71.2 kg)     Exam   General Appearance   Awake, alert, oriented, in no acute distress  HEENT - Head is normocephalic, atraumatic. Pupil reactive to light  Neck - Supple, symmetrical, trachea midline and Soft, trachea midline and straight  Lungs -bilateral wheezing and crackles, more on the right side, moderate air exchange  Cardiovascular - Heart sounds are normal.  Regular rhythm normal rate without murmur, gallop or rub. Abdomen - Soft, nontender, nondistended, no masses or organomegaly  Neurologic - CN II-XII are grossly intact. There are no focal motor or sensory deficits  Skin - No bruising or bleeding  Extremities - No cyanosis, clubbing or edema    Labs  - Old records and notes have been reviewed in [facility]   CBC:   Recent Labs     02/22/23  0904 02/23/23  0546   WBC 12.1* 18.0*   HGB 16.0* 12.2   HCT 48.3* 39.0    159     BMP:   Recent Labs     02/22/23  0904 02/23/23  0546    141   K 3.7 4.0   CO2 27 25   BUN 15 14   CREATININE 0.77 0.49*   LABGLOM >60 >60   GLUCOSE 98 94     PT/INR: No results for input(s): PROTIME, INR in the last 72 hours. APTT:No results for input(s): APTT in the last 72 hours.   LIVER PROFILE:  Recent Labs     02/22/23  0904   AST 20   ALT 14   LABALBU 4.1     ABG:  Lab Results   Component Value Date/Time    PHART 7.400 10/14/2022 07:55 AM    TPY9HJA 51.4 10/14/2022 07:55 AM    PO2ART 99.0 10/14/2022 07:55 AM    ZTV4ZZQ 31.1 10/14/2022 07:55 AM    MMI8JHU 35 11/02/2020 04:16 AM    G6UKCMSY 97.4 10/14/2022 07:55 AM    FIO2 28 10/10/2022 05:30 AM       Lab Results   Component Value Date/Time    POCPH 7.424 08/25/2022 05:15 AM    POCPCO2 35.2 08/25/2022 05:15 AM    POCPO2 75.9 08/25/2022 05:15 AM    POCHCO3 23.0 08/25/2022 05:15 AM    PBEA 5.0 10/14/2022 07:55 AM    JGT1FZF 35 11/02/2020 04:16 AM    VMLH8ITG 96 08/25/2022 05:15 AM    FIO2 28 10/10/2022 05:30 AM       Radiology    CXR  2/23/2023  Worsening bilateral pulmonary infiltrates    (See actual reports for details)    \"Thank you for asking us to see this patient\"    Case discussed with nurse and patient/family. Questions and concerns addressed.     Electronically signed by     Suzi Cyr MD on 2/23/2023 at 11:05 AM  Pulmonary Critical Care and Sleep Medicine,  [facility]  Cell: [phone]  Office: [phone]

## 2023-02-23 NOTE — PROGRESS NOTES
Patient walked to toilet with walker and RN present. Patient was able to void in the toilet. Patient stated she could not use a pure wick.

## 2023-02-23 NOTE — PLAN OF CARE
Problem: Respiratory - Adult  Goal: Achieves optimal ventilation and oxygenation  2/22/2023 2312 by Viktoria Ling RCP  Outcome: Progressing

## 2023-02-23 NOTE — PROGRESS NOTES
Physical Therapy  Facility/Department: [facility] ICU  Physical Therapy Initial Assessment    Name: Citlali Curtis  : 1945  MRN: [id number]  Date of Service: 2023    Discharge Recommendations:  Patient would benefit from continued therapy after discharge   Pt presented to ED on 23 with complaints of shortness of breath. Pt reported increased work of breathing started a few days ago and has progressively worsened. Pt had attempted to take her different breathing treatments at home without improvement. Patient called 911 and was transported to [facility] emergency room. Chest x-ray showed: Diffuse pulmonary infiltrates concerning for pneumonia. Pt admitted for further medical management of multifocal pneumonia  RN reports patient is medically stable for therapy treatment this date. Chart reviewed prior to treatment and patient is agreeable for therapy. Patient Diagnosis(es): There were no encounter diagnoses. Past Medical History:  has a past medical history of A-fib ([address] Biventricular congestive heart failure ([address] Bronchiectasis with acute exacerbation ([address] CAD (coronary artery disease), Chronic pain syndrome, COPD (chronic obstructive pulmonary disease) ([address] Depression, GERD (gastroesophageal reflux disease), Hypothyroidism, Impaired fasting glucose, Iron deficiency anemia, Osteoporosis, Pulmonary fibrosis ([address] and Subdural hematoma. Past Surgical History:  has a past surgical history that includes Hysterectomy (1991); Carpal tunnel release (Right, 1999); Total knee arthroplasty (Right, 2021); fracture surgery (Left, 2018); Wrist fracture surgery (Left, 2018); lumbar discectomy (1993); Lumbar disc surgery (02/15/1994); back surgery (1998); back surgery (1999); Carpal tunnel release (Left, 1999); Neck surgery (2002); Carpal tunnel release (Right, 2002);  Carpal tunnel release (Left, 2003); back surgery (10/23/2003); back surgery (10/23/2003); Cervical disc surgery (10/25/2004); Cervical disc surgery (12/22/2004); Neck surgery (12/09/2005); Toe amputation (10/08/2006); Toe amputation (03/07/2008); lumbar laminectomy (06/09/2008); Toe amputation (10/03/2008); Humerus fracture surgery (Right, 10/03/2010); Knee arthroscopy (Right, 06/02/2011); back surgery (09/11/2017); knee surgery (Right, 02/04/2016); Wrist fracture surgery (Left, 12/20/2018); Wrist surgery (Left, 07/02/2019); and craniotomy (Left, 8/23/2022). Assessment   Body Structures, Functions, Activity Limitations Requiring Skilled Therapeutic Intervention: Decreased functional mobility ; Decreased ADL status; Decreased strength;Decreased safe awareness;Decreased endurance;Decreased balance;Decreased posture  Assessment: Pt with deficits of transfers, ambulation, balance, cognition, posture, safety awareness and endurance this session. Pt required 2 assist for safe transfers & gait. With current deficits, Pt at  risk for falls & requires continued PT to maximize independence with functional mobility, balance, safety awareness & activity tolerance to improve overall tolerance of ADL's. Pt currently functioning below baseline with AM-PAC mobility score of 14/24. Anticipate Pt should be appropriate to D/C Home with Assist, & due to recent hospitalization and medical condition, pt would benefit from additional intermittent skilled therapy at time of discharge.   Therapy Prognosis: Good  Decision Making: Medium Complexity  Exam: ROM, MMT, functional mobility, activity tolerance, Balance, & [employer] AM-PAC 6 Clicks Basic Mobility  Clinical Presentation: evolving  Requires PT Follow-Up: Yes  Activity Tolerance  Activity Tolerance: Patient limited by endurance (pt very impulsive with POOR safety awareness)     Plan   Physcial Therapy Plan  General Plan: 5-7 times per week  Current Treatment Recommendations: Strengthening, Balance training, Functional mobility training, Transfer training, ADL/Self-care training, Endurance training, Gait training, Stair training, Home exercise program, Neuromuscular re-education, Safety education & training, Patient/Caregiver education & training, Positioning  Safety Devices  Type of Devices: Call light within reach, Chair alarm in place, Gait belt, Patient at risk for falls, Left in chair, Nurse notified     Restrictions  Restrictions/Precautions  Restrictions/Precautions: General Precautions, Fall Risk  Position Activity Restriction  Other position/activity restrictions: Up as tolerated, RUE IV, heels off bed @ all times, 2LO2 via NC, ALARMS     Subjective   General  Chart Reviewed: Yes  Patient assessed for rehabilitation services?: Yes  Additional Pertinent Hx: atrial fibrillation, coronary artery disease, COPD, depression, GERD, hypothyroidism, iron deficiency anemia, pulmonary fibrosis, subdural hematoma and is on home O2 at 2 L per nasal cannula.   Response To Previous Treatment: Not applicable  General Comment  Comments: RN okays PT  Subjective  Subjective: Pt agreeable to PT         Social/Functional History  Social/Functional History  Lives With: Spouse  Type of Home: House ([address] [place] [address])  Home Layout: Two level, Able to Live on Main level with bedroom/bathroom  Home Access: Stairs to enter without rails  Entrance Stairs - Number of Steps: 2 steps  Entrance Stairs - Rails: Both  Bathroom Shower/Tub: Tub/Shower unit, Shower chair without back  Bathroom Toilet: Standard  Bathroom Equipment: Grab bars in shower  Bathroom Accessibility: Accessible  Home Equipment: Oxygen, Grab bars, Cane, Walker, rolling (2L at night thru [facility])  Has the patient had two or more falls in the past year or any fall with injury in the past year?: Yes (Pt fell in July, sustained brain bleed & had to have surgery to evacuate the bleed)  Receives Help From: Family  ADL Assistance: [address] [place] [address] Avenue: Independent  Homemaking Responsibilities: Yes  Ambulation Assistance: Independent  Transfer Assistance: Independent  Active : No  Patient's  Info: Spouse and family  Mode of Transportation: Car  Occupation: Retired  Type of Occupation: [address] [place] [address] Street: vale Dickey  Vision/Hearing  Vision  Vision: Impaired  Vision Exceptions: Wears glasses at all times  Hearing  Hearing: Within functional limits    Cognition   Orientation  Overall Orientation Status: Within Functional Limits  Cognition  Overall Cognitive Status: Exceptions  Arousal/Alertness: Appropriate responses to stimuli  Following Commands: Follows all commands without difficulty  Attention Span: Appears intact  Memory: Appears intact  Safety Judgement: Decreased awareness of need for assistance;Decreased awareness of need for safety  Problem Solving: Decreased awareness of errors;Assistance required to identify errors made  Insights: Decreased awareness of deficits  Initiation: Does not require cues  Sequencing: Does not require cues  Cognition Comment: Pt moves very impulsively     Objective   Heart Rate: (!) 102  Heart Rate Source: Monitor  BP: (!) 111/55  BP Location: Left upper arm  BP Method: Automatic  Patient Position: Semi fowlers  MAP (Calculated): 74  Resp: 18  SpO2: (!) 84 %  O2 Device: Nasal cannula     Observation/Palpation  Posture: Fair  Observation: RUE IV, pt on room air & resting SPO2 was 92%. With activity did drop to 88-89% but with pursed lip breathing quickly stabilized to 91-94%;  Pt hyperventilating after amb from BR but SPO2 was above 92% but requested to put back on 2LO2 NC & SPO2 at 97%  Edema: minor Italo ankles  Gross Assessment  Sensation: Intact (pt states her left foot is numb ALL THE TIME)     AROM RLE (degrees)  RLE AROM: WFL  AROM LLE (degrees)  LLE AROM : WFL  AROM RUE (degrees)  RUE General AROM: See OT assessment  AROM LUE (degrees)  LUE General AROM: See OT assessment  Strength RLE  Comment: 4/5 hip  Strength LLE  Comment: 4/5 hip  Strength BOAZE  Comment: See OT assessment  Strength GRACEE  Comment: See OT assessment          Bed mobility  Supine to Sit: Unable to assess  Bed Mobility Comments: unable to assess, pt up in chair at start of session & returned to chair at end of visit  Transfers  Sit to Stand: Moderate Assistance;2 Person Assistance  Stand to Sit: Moderate Assistance;2 Person Assistance  Bed to Chair: Moderate assistance;2 Person Assistance  Stand Pivot Transfers: Moderate Assistance;2 Person Assistance  Lateral Transfers: Moderate Assistance;2 Person Assistance  Comment: MAX VC + tactile assist on correct use of upper body for safe sit/stand, nose over toes tech, upright posture, pacing + to back all way back to surface with walker until she feels touch behind legs, & to ensure she reaches with UB support to arms of chair, to SLOW down & take time for transitions to make sure she has no feeling of unsteadiness or dizziness AND for AWARENESS/assist with lines  Ambulation  Surface: Level tile  Device: Rolling Walker  Assistance: Moderate assistance;2 Person assistance  Quality of Gait: step to pattern with FAST sammie, MAX cues to keep walker close/amb inside base of walker & upright posture for safety/scanning, to SLOW pace for PACING/reduce fall risk AND for AWARENESS/assist with lines  Gait Deviations: Decreased step length;Decreased step height  Distance: 15ft, 10ft  Comments: Pt amb to BR for toileting, 2MOD Assistance to sit to toilet.  Pt stood with 2MOD Assistance, stood 2 minutes for pericare & to pull up brief with MIN Assist, amb 3ft to sink for hand hygiene x 2 minutes then ambulated 10 more ft with R/walker & sat to chair with 2MOD Assistance     Balance  Posture: Fair  Sitting - Static: Good  Sitting - Dynamic: Good  Standing - Static: Good;- (R/W)  Standing - Dynamic: Fair;+ (R/W)  Single Leg Stance R Le (at R/W)  Single Leg Stance L Le (at R/W)  Resistive Exercises: ankle pumps x 20  Dynamic Sitting Balance Exercises: Core strengthening sitting EOB with bilat UE support, then UE support x 1, & alternately lifting feet up off floor with Contact Guard Assistance  Static Standing Balance Exercises: Pt stood 2 minutes with R/walker & CGA  Dynamic Standing Balance Exercises: Pt completed static standing weight shifts & picking feet up off floor with UB support at R/walker to improve core strength & stability  Postural Correction Exercises: upright posture  Breathing Techniques: pursed lip breathing techniques  & deep breathing with incentive spirometer including technique, frequency and purpose, completed 10 reps     All lines intact, call light within reach, and patient positioned comfortably at end of treatment. All patient needs addressed prior to ending therapy session. OutComes Score                                                  AM-PAC Score  AM-PAC Inpatient Mobility Raw Score : 14 (02/23/23 1408)  AM-PAC Inpatient T-Scale Score : 38.1 (02/23/23 1408)  Mobility Inpatient CMS 0-100% Score: 61.29 (02/23/23 1408)  Mobility Inpatient CMS G-Code Modifier : CL (02/23/23 1408)          Tinneti Score       Goals  Short Term Goals  Time Frame for Short Term Goals: 12 visits  Short Term Goal 1: Inc bed-mobility & transfers to independent to enable pt to safely get in/OOB & chair to return to PLOF & decrease risk for falls  Short Term Goal 2: Inc gait to amb 300ft or > indep w/ RW with appropriate pacing techniques to enable pt to return to previous level of independence & able to demonstrate indep/ safe use of RW in functional activities including approaching surfaces and turning to sit. Short Term Goal 3: Pt able to go up/down 3 steps with Italo rails independently  Short Term Goal 4:  Inc standing balance to good with device to facilitate pt independence for performance of ADL's & functional mobility, & reduce fall risk  Short Term Goal 5: Pt able to tolerate 30-40 min of activity to include 15-20 reps of ex, NMR & functional mobility with device to facilitate activity tolerance to [facility]  Additional Goals?: Yes  Short Term Goal 6: Ed pt on home ex's, safety, balance & endurance training, energy conservation(planning, pacing, prioritizing & positioning), self regulating breathing techniques, fall prevention, & issue written pt education       Education  Patient Education  Education Given To: Patient  Education Provided: Role of Therapy;Plan of Care; Fall Prevention Strategies;Transfer Training;Energy Conservation;Precautions  Education Provided Comments: Pt educated on purpose of acute PT eval, importance of continued mobility throughout admission, safety awareness, fall risk prevention, safe transfers & ambulation w/ RW & appropriate pacing, circulation ex's, breathing techniques, prevention of sedentary complications, and PT POC. Pt demonstrated FAIR carryover  Pt requires continued reinforcement of education. Education Method: Demonstration;Verbal  Education Outcome: Continued education needed      Therapy Time   Individual Concurrent Group Co-treatment   Time In 12         Time Out 1150         Minutes 45             Additional 10 minutes for chart review    Treatment time: 40 minutes  Co-treatment with OT warranted secondary to decreased safety and independence requiring 2 skilled therapy professionals to address individual discipline's goals. PT addressing core control in transitions with bed mobility & transfers, seated/standing posture, pressure relief, seated & standing postural alignment and breathing techniques, safety/scanning, & fall prevention in ambulation techniques.         [address], PT

## 2023-02-23 NOTE — PROGRESS NOTES
[facility]  Office: [address], DO, Jenni Eneida, DO, Jeseliliana Gutierrez, DO, Stacy Castillo Blood, DO, Melvin Deluca MD, Ming Cole MD, Felipe Merlos MD, Sydney Carrasquillo MD,  Wan Iqbal MD, Thomas Chavis MD, Miguel Sorensen, DO, Jeff Almeida MD,  Debra Gao MD, Sheldon Romano MD, Miguel Tucker DO, Domenica Mascorro MD, Arelis Conley MD, Lilli Hernandez DO, Bereket Acevedo MD, Ada Jones MD, Tejal Jean MD, Marisol Phelps MD, Catherine Denny DO, Ainsley Mosher MD, Bruna Guzman MD, Milad Greenfield, Nabeel Burger, CNP, Philip Verdin, CNP, Merle Bunch, CNP,  Kerrie Lea, [facility], Bonnie Narayan, CNP, Dustin Hernandez, CNP, Sammy Villegas, CNP, Sarina Hyman, CNP, Linda Sanders, CNP, Richard Brink PA-C, Courtney Murry, CNS, Jose C Delacruz, CNP, Vale Stewart, [facility]    Progress Note    2/23/2023    9:09 AM    Name:   Ruth Reid  MRN:     [id number]     Acct:      [de-identified]   Room:   [facility]1103-01   Day:  1  Admit Date:  2/22/2023  6:40 PM    PCP:   Fidela Ahumada, MD  Code Status:  Full Code    Subjective:     C/C: sob  Interval History Status: improved. Feels considerably better today  Denies cp/n/v  Less sob and has some cough    Previously required bipap, now on o2 2L    Tells me she had recently been on antibiotics when admitted to different hospital    Brief History:     Per my LAUREN:  \"Patient presents to [facility] emergency department with complaints of shortness of breath. Patient states that her increased work of breathing started a few days ago and has progressively worsened. Patient has a significant past medical history of atrial fibrillation, coronary artery disease, COPD, depression, GERD, hypothyroidism, iron deficiency anemia, pulmonary fibrosis, subdural hematoma and is on home O2 at 2 L per nasal cannula.   Patient denies any recent fevers, chills, nausea, vomiting and diarrhea. Patient states that she does experience chest discomfort with coughing. She has a productive cough noted. Patient had attempted to take her different breathing treatments at home without improvement. Patient called 911 and was transported to [facility] emergency room. Patient was given 125 mg of IV Solu-Medrol as well as an albuterol treatment in route. Patient was also placed on CPAP with improving oxygenation. \"    Review of Systems:     Constitutional:  negative for chills, fevers, sweats  Respiratory:  positive for cough, dyspnea on exertion, shortness of breath  Cardiovascular:  negative for chest pain, chest pressure/discomfort, lower extremity edema, palpitations  Gastrointestinal:  negative for abdominal pain, constipation, diarrhea, nausea, vomiting  Neurological:  negative for dizziness, headache    Medications:      Allergies:  No Known Allergies    Current Meds:   Scheduled Meds:    aspirin  81 mg Oral Daily    calcium carb-cholecalciferol  1 tablet Oral BID WC    levothyroxine  200 mcg Oral Daily    pantoprazole  40 mg Oral BID AC    pregabalin  300 mg Oral BID    rivaroxaban  20 mg Oral Daily with breakfast    sodium chloride flush  5-40 mL IntraVENous 2 times per day    ipratropium-albuterol  1 ampule Inhalation Q4H WA    cefTRIAXone (ROCEPHIN) IV  1,000 mg IntraVENous Q24H    And    azithromycin  500 mg Oral Q24H    guaiFENesin  600 mg Oral BID    traZODone  200 mg Oral Nightly     Continuous Infusions:    sodium chloride 50 mL/hr at 02/23/23 0724    sodium chloride       PRN Meds: albuterol, sodium chloride flush, sodium chloride, ondansetron **OR** ondansetron, magnesium hydroxide, acetaminophen **OR** acetaminophen    Data:     Past Medical History:   has a past medical history of A-fib ([facility] Biventricular congestive heart failure ([facility] Bronchiectasis with acute exacerbation ([facility] CAD (coronary artery disease), Chronic pain syndrome, COPD (chronic obstructive pulmonary disease) ([facility] [address] Depression, GERD (gastroesophageal reflux disease), Hypothyroidism, Impaired fasting glucose, Iron deficiency anemia, Osteoporosis, Pulmonary fibrosis ([facility] and Subdural hematoma. Social History:   reports that she quit smoking about 46 years ago. Her smoking use included cigarettes. She has a 10.00 pack-year smoking history. She has never used smokeless tobacco. She reports that she does not drink alcohol and does not use drugs. Family History:   Family History   Problem Relation Age of Onset    Heart Attack Father 61    Heart Attack Sister     Heart Disease Brother        Vitals:  BP (!) 123/47   Pulse 70   Temp 97.3 °F (36.3 °C) (Temporal)   Resp 17   Ht 5' 5\" (1.651 m)   Wt 156 lb 15.5 oz (71.2 kg)   SpO2 95%   BMI 26.12 kg/m²   Temp (24hrs), Av °F (36.7 °C), Min:97.3 °F (36.3 °C), Max:99.3 °F (37.4 °C)    No results for input(s): POCGLU in the last 72 hours. I/O (24Hr):     Intake/Output Summary (Last 24 hours) at 2023 0909  Last data filed at 2023 0724  Gross per 24 hour   Intake 575.83 ml   Output 850 ml   Net -274.17 ml       Labs:  Hematology:  Recent Labs     23  0904 23  0546   WBC 12.1*  --  18.0*   RBC 5.13*  --  4.09   HGB 16.0*  --  12.2   HCT 48.3*  --  39.0   MCV 94.2  --  95.4   MCH 31.2  --  29.8   MCHC 33.1  --  31.3   RDW 15.0*  --  15.6*     --  159   MPV 9.6  --  9.2   CRP  --  76.2*  --      Chemistry:  Recent Labs     23  0904 23  0546    141   K 3.7 4.0    110*   CO2 27 25   GLUCOSE 98 94   BUN 15 14   CREATININE 0.77 0.49*   ANIONGAP 10 6*   LABGLOM >60 >60   CALCIUM 9.1 8.4*   PROBNP 79  --    TROPHS 17* 14     Recent Labs     23  0904   PROT 6.9   LABALBU 4.1   AST 20   ALT 14   ALKPHOS 77   BILITOT 0.3     ABG:  Lab Results   Component Value Date/Time    POCPH 7.424 2022 05:15 AM    PHART 7.400 10/14/2022 07:55 AM    POCPCO2 35.2 2022 05:15 AM    JCC7NZV 51.4 10/14/2022 07:55 AM    POCPO2 75.9 08/25/2022 05:15 AM    PO2ART 99.0 10/14/2022 07:55 AM    POCHCO3 23.0 08/25/2022 05:15 AM    LFV0ZCM 31.1 10/14/2022 07:55 AM    NBEA 1 08/25/2022 05:15 AM    PBEA 5.0 10/14/2022 07:55 AM    DLG4KHW 35 11/02/2020 04:16 AM    RUTW1OBO 96 08/25/2022 05:15 AM    C1CANXZN 97.4 10/14/2022 07:55 AM    FIO2 28 10/10/2022 05:30 AM     Lab Results   Component Value Date/Time    SPECIAL 5ML RT HAND 02/22/2023 09:40 AM     Lab Results   Component Value Date/Time    CULTURE NO GROWTH 22 HOURS 02/22/2023 09:40 AM       Radiology:  XR CHEST PORTABLE    Result Date: 2/23/2023  Stable exam since yesterday given differences in radiographic technique and patient positioning. XR CHEST PORTABLE    Result Date: 2/22/2023  Diffuse pulmonary infiltrates concerning for pneumonia.        Physical Examination:        General appearance:  alert, cooperative and no distress  Mental Status:  oriented to person, place and time and normal affect  Lungs:  coarse bilaterally, normal effort  Heart:  regular rate and rhythm, no murmur  Abdomen:  soft, nontender, nondistended, normal bowel sounds, no masses, hepatomegaly, splenomegaly  Extremities:  no edema, redness, tenderness in the calves  Skin:  no gross lesions, rashes, induration    Assessment:        Hospital Problems             Last Modified POA    * (Principal) Multifocal pneumonia 2/22/2023 Yes    Pulmonary fibrosis (HCC) (Chronic) 2/22/2023 Yes    History of subdural hematoma 2/22/2023 Yes    Dyspnea, unspecified 2/22/2023 Yes    GERD (gastroesophageal reflux disease) 2/22/2023 Yes    Overview Signed 2/22/2023  1:20 PM by RYAN Parrish - ADRI     Formatting of this note might be different from the original.  POA: Yes  - this is POA and being treated with home medications         Oxygen dependent 2/22/2023 Yes    Acute on chronic respiratory failure with hypoxia ([address] 2/22/2023 Yes    Hypothyroidism (Chronic) 2/22/2023 Yes    Lactic acidosis 2/22/2023 Yes    COPD (chronic obstructive pulmonary disease) ([facility] (Chronic) 2/22/2023 Yes    A-fib ([facility] 2/22/2023 Yes       Plan:        Bipap prn, currently on nc o2 and comfortable  Cont iv antibiotics with rocephin/zithromax; she was on zosyn/levaquin followed by zpak back in October  Pulm eval pending  Possible transfer out of icu later today depending on how she does and what pulzia thinks  D/w jackie MCDUFFIE Blood, DO  2/23/2023  9:09 AM

## 2023-02-23 NOTE — PLAN OF CARE
Problem: Respiratory - Adult  Goal: Achieves optimal ventilation and oxygenation  2/23/2023 1823 by Philip Carnes RCP  Outcome: Progressing     Problem: Respiratory - Adult  Goal: Able to breathe comfortably  Outcome: Progressing

## 2023-02-23 NOTE — PROGRESS NOTES
Dr Juan Whitehead wants the patient to have a trilogy machine for home. This RN reached out to [employer] discharge planner to start looking into getting the patient the trilogy.

## 2023-02-23 NOTE — PROGRESS NOTES
End Of Shift Note  [facility]  Summary of shift: Sheikh removed per order at 2130 and has not voided; bladder scan completed and showed 134mL; TEJINDER Ashraf notified and said continue to monitor; pt tolerated bipap for short times throughout the shift, C/O headaches; pressures soft around 3-5 am, MAPS 59-60 but came up once pt was aroused from sleep. Vitals:    Vitals:    02/23/23 0230 02/23/23 0300 02/23/23 0331 02/23/23 0400   BP:  (!) 100/52  (!) 80/46   Pulse: 66 66 61 60   Resp: 16 18 14 13   Temp:    97.5 °F (36.4 °C)   TempSrc:    Temporal   SpO2: 92% 93% 94% 94%   Weight:       Height:            I&O:   Intake/Output Summary (Last 24 hours) at 2/23/2023 0732  Last data filed at 2/23/2023 0724  Gross per 24 hour   Intake 575.83 ml   Output 850 ml   Net -274.17 ml       Resp Status: 95-97% while on NC 2.5L or Bipap at 30%; wheezes heard BUL/BLL diminished, pt denies SOB, productive cough with copious amounts of tan thick sputum.     Ventilator Settings:     / / /FiO2 : (S) 30 %    Critical Care IV infusions:   sodium chloride 50 mL/hr at 02/23/23 0724    sodium chloride          LDA:   Peripheral IV Left Antecubital (Active)   Number of days:        External Urinary Catheter (Active)   Number of days: 0       Puncture 08/25/22 Head (Active)   Number of days: 181       Wound 08/22/22 Face Left;Upper (Active)   Number of days: 184       Incision 08/23/22 Head Left (Active)   Number of days: 183      ;

## 2023-02-23 NOTE — PLAN OF CARE
Problem: Chronic Conditions and Co-morbidities  Goal: Patient's chronic conditions and co-morbidity symptoms are monitored and maintained or improved  Outcome: Progressing  Flowsheets (Taken 2/22/2023 1930)  Care Plan - Patient's Chronic Conditions and Co-Morbidity Symptoms are Monitored and Maintained or Improved: Monitor and assess patient's chronic conditions and comorbid symptoms for stability, deterioration, or improvement     Problem: Discharge Planning  Goal: Discharge to home or other facility with appropriate resources  Outcome: Progressing  Flowsheets (Taken 2/22/2023 1930)  Discharge to home or other facility with appropriate resources: Identify barriers to discharge with patient and caregiver     Problem: Pain  Goal: Verbalizes/displays adequate comfort level or baseline comfort level  Outcome: Progressing  Flowsheets  Taken 2/23/2023 0400  Verbalizes/displays adequate comfort level or baseline comfort level: Assess pain using appropriate pain scale  Taken 2/23/2023 0000  Verbalizes/displays adequate comfort level or baseline comfort level: Assess pain using appropriate pain scale  Taken 2/22/2023 2000  Verbalizes/displays adequate comfort level or baseline comfort level: Assess pain using appropriate pain scale     Problem: Respiratory - Adult  Goal: Achieves optimal ventilation and oxygenation  2/23/2023 0458 by Evans Pearson RN  Outcome: Progressing  Flowsheets (Taken 2/23/2023 0330)  Achieves optimal ventilation and oxygenation: Assess for changes in respiratory status  2/22/2023 2312 by Tammy Champagne RCP  Outcome: Progressing  2/22/2023 2312 by Tammy Champagne RCP  Outcome: Progressing  Flowsheets (Taken 2/22/2023 1930 by Evans Pearson RN)  Achieves optimal ventilation and oxygenation: Assess for changes in respiratory status     Problem: Skin/Tissue Integrity  Goal: Absence of new skin breakdown  Description: 1. Monitor for areas of redness and/or skin breakdown  2.   Assess vascular access sites hourly  3. Every 4-6 hours minimum:  Change oxygen saturation probe site  4. Every 4-6 hours:  If on nasal continuous positive airway pressure, respiratory therapy assess nares and determine need for appliance change or resting period.   Outcome: Progressing     Problem: Safety - Adult  Goal: Free from fall injury  Outcome: Progressing     Problem: ABCDS Injury Assessment  Goal: Absence of physical injury  Outcome: Progressing

## 2023-02-23 NOTE — DISCHARGE INSTR - COC
Continuity of Care Form    Patient Name: Diana Louis   :  1945  MRN:  [id number]    Admit date:  2023  Discharge date:  ***    Code Status Order: Full Code   Advance Directives:     Admitting Physician:  Sabra Christine DO  PCP: Joan Larkin MD    Discharging Nurse: [facility] Unit/Room#: 1103/1103-01  Discharging Unit Phone Number: ***    Emergency Contact:   Extended Emergency Contact Information  Primary Emergency Contact: Jerrell South  Address: [facility] [address] Phone: [phone]  Mobile Phone: [phone]  Relation: Spouse  Hearing or visual needs: None  Other needs: None  Preferred language: English   needed? No  Secondary Emergency Contact: [address] Phone: [phone]  Relation: Child  Preferred language: English   needed?  No    Past Surgical History:  Past Surgical History:   Procedure Laterality Date    BACK SURGERY  1998    spinal cord stimulator    BACK SURGERY  1999    infusion pump insertion    BACK SURGERY  10/23/2003    spinal cord stimulator removed    BACK SURGERY  10/23/2003    new infusion pump placed    BACK SURGERY  2017    replaced infusion pump    CARPAL TUNNEL RELEASE Right 1999    CARPAL TUNNEL RELEASE Left 1999    CARPAL TUNNEL RELEASE Right 2002    CARPAL TUNNEL RELEASE Left 2003    CERVICAL DISC SURGERY  10/25/2004    anterior cervical diskectomy C7-T1    CERVICAL DISC SURGERY  2004    anterior cervical discectomy O6-9 (complicated by damaged nerve to vocal cord)    CRANIOTOMY Left 2022    LEFT CRANIOTOMY FOR SUBDURAL HEMATOMA EVACUATION performed by Ligia Colorado DO at [address] [place] [address] Left 2018    ORIF wrist    HUMERUS FRACTURE SURGERY Right 10/03/2010    HYSTERECTOMY (CERVIX STATUS UNKNOWN)  1991    KNEE ARTHROSCOPY Right 2011    KNEE SURGERY Right 2016    repair distal portion of knee arthroplasty due to prosthesis loosening    LUMBAR DISC SURGERY  02/15/1994    due to scar tissue from previous surgery    LUMBAR DISCECTOMY  08/30/1993    L5-S1    LUMBAR LAMINECTOMY  06/09/2008    L3-4-5    NECK SURGERY  05/03/2002    posterior plate fusion    NECK SURGERY  12/09/2005    reconnect nerve to vocal cord [facility]    TOE AMPUTATION  10/08/2006    left 3rd toe - osteomyelitis    TOE AMPUTATION  03/07/2008    left 4th toe partial amputation    TOE AMPUTATION  10/03/2008    left 2nd toe    TOTAL KNEE ARTHROPLASTY Right 03/19/2021    WRIST FRACTURE SURGERY Left 12/20/2018    WRIST OPEN REDUCTION INTERNAL FIXATION-DISTAL RADIUS performed by Gibson Lynch MD at [facility] Left 12/20/2018    WRIST SURGERY Left 07/02/2019    left wrist ulnar shortening osteotomy       Immunization History:   Immunization History   Administered Date(s) Administered    COVID-19, MODERNA BLUE border, Primary or Immunocompromised, (age 12y+), IM, 100 mcg/0.5mL 02/27/2021, 03/27/2021, 10/25/2021, 04/18/2022    COVID-19, MODERNA Bivalent BOOSTER, (age 12y+), IM, 48 mcg/0.5 mL 12/05/2022    Influenza Virus Vaccine 11/19/2011    Influenza, FLUAD, (age 72 y+), Adjuvanted, 0.5mL 10/25/2022    Influenza, High Dose (Fluzone 65 yrs and older) 10/07/2015, 09/30/2016, 10/04/2018, 11/08/2019    Influenza, Triv, inactivated, subunit, adjuvanted, IM (Fluad 65 yrs and older) 10/16/2017, 10/04/2018    Pneumococcal Conjugate 13-valent (Raguel Boor) 05/25/2017, 06/05/2018, 07/31/2018    Pneumococcal Polysaccharide ([id number]) 03/19/2010, 06/05/2014    Td (Adult), 5 Lf Tetanus Toxoid, Pf (Tenivac, Decavac) 08/22/2022    Zoster Recombinant (Shingrix) 12/22/2021, 03/01/2022       Active Problems:  Patient Active Problem List   Diagnosis Code    Hypothyroidism E03.9    Major depressive disorder F32.9    Chronic midline low back pain without sciatica M54.50, G89.29    Iron deficiency anemia D50.9    Lactic acidosis E87.20    COPD (chronic obstructive pulmonary disease) ([facility]) J44.9    Biventricular congestive heart failure ([facility]) I50.82    Anemia D64.9    A-fib ([facility]) I48.91    Pulmonary fibrosis ([facility]) J84.10    History of subdural hematoma Z86.79    Fall as cause of accidental injury in home as place of occurrence, initial encounter W19. 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 ([facility]) G93.89, S06.2X0S    Sepsis due to pneumonia ([facility] [address] J18.9, A41.9    Multifocal pneumonia J18.9    Dyspnea, unspecified R06.00    GERD (gastroesophageal reflux disease) K21.9    Oxygen dependent Z99.81    Acute on chronic respiratory failure with hypoxia ([facility]) J96.21       Isolation/Infection:   Isolation            No Isolation          Patient Infection Status       Infection Onset Added Last Indicated Last Indicated By Review Planned Expiration Resolved Resolved By    None active    Resolved    COVID-19 (Rule Out) 02/22/23 02/22/23 02/22/23 COVID-19, Rapid (Ordered)   02/22/23 Rule-Out Test Resulted    COVID-19 (Rule Out) 10/08/22 10/08/22 10/08/22 Respiratory Panel, Molecular, with COVID-19 (Restricted: peds pts or suitable admitted adults) (Ordered)   10/08/22 Rule-Out Test Resulted    COVID-19 (Rule Out) 10/08/22 10/08/22 10/08/22 COVID-19, Rapid (Ordered)   10/08/22 Rule-Out Test Resulted    COVID-19 (Rule Out) 09/09/22 09/09/22 09/09/22 COVID-19, Rapid (Ordered)   09/09/22 Rule-Out Test Resulted    COVID-19 (Rule Out) 04/28/22 04/28/22 04/28/22 COVID-19, Rapid (Ordered)   04/28/22 Rule-Out Test Resulted    COVID-19 (Rule Out) 02/05/22 02/05/22 02/05/22 Respiratory Panel, Molecular, with COVID-19 (Restricted: peds pts or suitable admitted adults) (Ordered)   02/06/22 Rule-Out Test Resulted    COVID-19 (Rule Out) 02/05/22 02/05/22 02/05/22 COVID-19, Rapid (Ordered)   02/05/22 Rule-Out Test Resulted    COVID-19 (Rule Out) 01/17/22 01/17/22 01/17/22 Respiratory Panel, Molecular, with COVID-19 (Restricted: peds pts or suitable admitted adults) (Ordered)   22 Rule-Out Test Resulted    COVID-19 (Rule Out) 01/15/22 01/15/22 01/15/22 COVID-19 (Ordered)   22 Rule-Out Test Resulted    COVID-19 (Rule Out) 01/15/22 01/15/22 01/15/22 COVID-19, Rapid (Ordered)   01/15/22 Rule-Out Test Resulted    COVID-19 20 COVID-19   21     S/s     COVID-19 (Rule Out) 20 COVID-19 (Ordered)   20 Rule-Out Test Resulted    COVID-19 (Rule Out) 10/22/20 10/22/20 10/22/20 COVID-19, PCR (Ordered)   10/22/20 Rule-Out Test Resulted    COVID-19 (Rule Out) 20 COVID-19, PCR (Ordered)   20 Rule-Out Test Resulted            Nurse Assessment:  Last Vital Signs: BP (!) 111/55   Pulse (!) 102   Temp 97.1 °F (36.2 °C)   Resp 18   Ht 5' 5\" (1.651 m)   Wt 156 lb 15.5 oz (71.2 kg)   SpO2 (!) 84%   BMI 26.12 kg/m²     Last documented pain score (0-10 scale): Pain Level: 3  Last Weight:   Wt Readings from Last 1 Encounters:   23 156 lb 15.5 oz (71.2 kg)     Mental Status:  {IP PT MENTAL STATUS:67842}    IV Access:  { STEPHANIE IV ACCESS:294622275}    Nursing Mobility/ADLs:  Walking   {CHP DME NMQO:398057453}  Transfer  {CHP DME UPXF:082783943}  Bathing  {CHP DME ZJXW:070406830}  Dressing  {CHP DME GEHI:367239290}  Toileting  {CHP DME KXKQ:253983218}  Feeding  {CHP DME PBVV:741490838}  Med Admin  {CHP DME QNWM:661049171}  Med Delivery   { STEPHANIE MED Delivery:565255568}    Wound Care Documentation and Therapy:  Puncture 22 Head (Active)   Number of days: 181       Wound 22 Face Left;Upper (Active)   Number of days: 184       Incision 22 Head Left (Active)   Number of days: 184        Elimination:  Continence:    Bowel: {YES / YT:72964}  Bladder: {YES / AH:65632}  Urinary Catheter: {Urinary Catheter:140298911}   Colostomy/Ileostomy/Ileal Conduit: {YES / XV:77628}       Date of Last BM: ***    Intake/Output Summary (Last 24 hours) at 2023 1416  Last data filed at 2023 6131  Gross per 24 hour   Intake 575.83 ml   Output 850 ml   Net -274.17 ml     I/O last 3 completed shifts:  In: -   Out: 850 [Urine:850]    Safety Concerns:     [address] Safety Concerns:208673280}    Impairments/Disabilities:      [address] Impairments/Disabilities:068813357}    Nutrition Therapy:  Current Nutrition Therapy:   [address]herjee [facility] Diet List:769153712}    Routes of Feeding: {CHP DME Other Feedings:128975203}  Liquids: {Slp liquid thickness:14075}  Daily Fluid Restriction: {CHP DME Yes amt example:703972514}  Last Modified Barium Swallow with Video (Video Swallowing Test): {Done Not Done KQJQ:267646133}    Treatments at the Time of Hospital Discharge:   Respiratory Treatments: ***  Oxygen Therapy:  {Therapy; copd oxygen:52714}  Ventilator:    { CC Vent NUQO:597765825}    Rehab Therapies: Physical Therapy and Occupational Therapy  Weight Bearing Status/Restrictions: [address] [facility] Weight Bearin}  Other Medical Equipment (for information only, NOT a DME order):  {EQUIPMENT:556587813}  Other Treatments: skilled RN assessment    Patient's personal belongings (please select all that are sent with patient):  {CHP DME Belongings:113665049}    RN SIGNATURE:  {Esignature:280839354}    CASE MANAGEMENT/SOCIAL WORK SECTION    Inpatient Status Date: ***    Readmission Risk Assessment Score:  Readmission Risk              Risk of Unplanned Readmission:  33           Discharging to Facility/ Agency   Name: [facility]        Address   [address] [place] [address]             Contact Information    [phone]             / signature: Electronically signed by Kam Maldonado RN on 23 at 2:17 PM EST    PHYSICIAN SECTION    Prognosis: {Prognosis:8455036464}    Condition at Discharge: [address] Patient Condition:655160967}    Rehab Potential (if transferring to Rehab): {Prognosis:6030884564}    Recommended Labs or Other Treatments After Discharge: ***    Physician Certification: I certify the above information and transfer of Yonis Dennis  is necessary for the continuing treatment of the diagnosis listed and that she requires {Admit to Appropriate Level of Care:07420} for {GREATER/LESS:130728908} 30 days.      Update Admission H&P: {CHP DME Changes in ZFOEB:603445899}    PHYSICIAN SIGNATURE:  {Esignature:234312474}

## 2023-02-23 NOTE — PROGRESS NOTES
Pt removed Bipap mask and tearful with C/O headache in front of head between eyes at top of nose 10/10; pt given medication and informed I would put the bipap back on once headache settles. Pt verbally agreed.

## 2023-02-23 NOTE — PROGRESS NOTES
Order to remove ulloa last night, removed at 2130, has brief and purewick in place, no urine output, pt has been drowsy from medication; tried to encourage urination but pt says she doesn't feel like she has to. Bladderscan complete and shows volume of 134; 50ml NaCl0.9%/hr infusing since 2000; reported to KYLEE Burroughs; response to monitor for now.

## 2023-02-24 LAB
ABSOLUTE EOS #: 0.2 K/UL (ref 0–0.44)
ABSOLUTE IMMATURE GRANULOCYTE: 0.08 K/UL (ref 0–0.3)
ABSOLUTE LYMPH #: 1.13 K/UL (ref 1.1–3.7)
ABSOLUTE MONO #: 0.61 K/UL (ref 0.1–1.2)
ANION GAP SERPL CALCULATED.3IONS-SCNC: 10 MMOL/L (ref 9–17)
BASOPHILS # BLD: 0 % (ref 0–2)
BASOPHILS ABSOLUTE: 0.03 K/UL (ref 0–0.2)
BUN SERPL-MCNC: 11 MG/DL (ref 8–23)
BUN/CREAT BLD: 19 (ref 9–20)
CALCIUM SERPL-MCNC: 9.3 MG/DL (ref 8.6–10.4)
CHLORIDE SERPL-SCNC: 106 MMOL/L (ref 98–107)
CO2 SERPL-SCNC: 25 MMOL/L (ref 20–31)
CREAT SERPL-MCNC: 0.59 MG/DL (ref 0.5–0.9)
EOSINOPHILS RELATIVE PERCENT: 1 % (ref 1–4)
GFR SERPL CREATININE-BSD FRML MDRD: >60 ML/MIN/1.73M2
GLUCOSE SERPL-MCNC: 69 MG/DL (ref 70–99)
HCT VFR BLD AUTO: 39.9 % (ref 36.3–47.1)
HGB BLD-MCNC: 12.5 G/DL (ref 11.9–15.1)
IMMATURE GRANULOCYTES: 1 %
LYMPHOCYTES # BLD: 8 % (ref 24–43)
MCH RBC QN AUTO: 30.3 PG (ref 25.2–33.5)
MCHC RBC AUTO-ENTMCNC: 31.3 G/DL (ref 28.4–34.8)
MCV RBC AUTO: 96.6 FL (ref 82.6–102.9)
MONOCYTES # BLD: 4 % (ref 3–12)
PDW BLD-RTO: 15.7 % (ref 11.8–14.4)
PLATELET # BLD AUTO: 167 K/UL (ref 138–453)
PMV BLD AUTO: 10.5 FL (ref 8.1–13.5)
POTASSIUM SERPL-SCNC: 3.9 MMOL/L (ref 3.7–5.3)
RBC # BLD: 4.13 M/UL (ref 3.95–5.11)
RBC # BLD: ABNORMAL 10*6/UL
SEG NEUTROPHILS: 85 % (ref 36–65)
SEGMENTED NEUTROPHILS ABSOLUTE COUNT: 11.84 K/UL (ref 1.5–8.1)
SODIUM SERPL-SCNC: 141 MMOL/L (ref 135–144)
WBC # BLD AUTO: 13.9 K/UL (ref 3.5–11.3)

## 2023-02-24 PROCEDURE — 6370000000 HC RX 637 (ALT 250 FOR IP): Performed by: CLINICAL NURSE SPECIALIST

## 2023-02-24 PROCEDURE — 6360000002 HC RX W HCPCS: Performed by: NURSE PRACTITIONER

## 2023-02-24 PROCEDURE — 99232 SBSQ HOSP IP/OBS MODERATE 35: CPT | Performed by: INTERNAL MEDICINE

## 2023-02-24 PROCEDURE — 6370000000 HC RX 637 (ALT 250 FOR IP): Performed by: NURSE PRACTITIONER

## 2023-02-24 PROCEDURE — 80048 BASIC METABOLIC PNL TOTAL CA: CPT

## 2023-02-24 PROCEDURE — 94640 AIRWAY INHALATION TREATMENT: CPT

## 2023-02-24 PROCEDURE — 97110 THERAPEUTIC EXERCISES: CPT

## 2023-02-24 PROCEDURE — 36415 COLL VENOUS BLD VENIPUNCTURE: CPT

## 2023-02-24 PROCEDURE — 94761 N-INVAS EAR/PLS OXIMETRY MLT: CPT

## 2023-02-24 PROCEDURE — 85025 COMPLETE CBC W/AUTO DIFF WBC: CPT

## 2023-02-24 PROCEDURE — 97530 THERAPEUTIC ACTIVITIES: CPT

## 2023-02-24 PROCEDURE — 6370000000 HC RX 637 (ALT 250 FOR IP): Performed by: INTERNAL MEDICINE

## 2023-02-24 PROCEDURE — 2060000000 HC ICU INTERMEDIATE R&B

## 2023-02-24 PROCEDURE — 2700000000 HC OXYGEN THERAPY PER DAY

## 2023-02-24 PROCEDURE — 97116 GAIT TRAINING THERAPY: CPT

## 2023-02-24 PROCEDURE — 2580000003 HC RX 258: Performed by: NURSE PRACTITIONER

## 2023-02-24 RX ORDER — GUAIFENESIN/DEXTROMETHORPHAN 100-10MG/5
5 SYRUP ORAL EVERY 4 HOURS PRN
Status: DISCONTINUED | OUTPATIENT
Start: 2023-02-24 | End: 2023-02-25 | Stop reason: HOSPADM

## 2023-02-24 RX ADMIN — PANTOPRAZOLE SODIUM 40 MG: 40 TABLET, DELAYED RELEASE ORAL at 08:07

## 2023-02-24 RX ADMIN — GUAIFENESIN AND DEXTROMETHORPHAN 5 ML: 100; 10 SYRUP ORAL at 16:53

## 2023-02-24 RX ADMIN — POTASSIUM CHLORIDE 20 MEQ: 10 CAPSULE, COATED, EXTENDED RELEASE ORAL at 08:07

## 2023-02-24 RX ADMIN — GUAIFENESIN 600 MG: 600 TABLET ORAL at 21:17

## 2023-02-24 RX ADMIN — AZITHROMYCIN MONOHYDRATE 500 MG: 250 TABLET ORAL at 21:17

## 2023-02-24 RX ADMIN — LEVOTHYROXINE SODIUM 200 MCG: 0.1 TABLET ORAL at 08:06

## 2023-02-24 RX ADMIN — PREGABALIN 300 MG: 100 CAPSULE ORAL at 21:17

## 2023-02-24 RX ADMIN — PANTOPRAZOLE SODIUM 40 MG: 40 TABLET, DELAYED RELEASE ORAL at 16:53

## 2023-02-24 RX ADMIN — SODIUM CHLORIDE, PRESERVATIVE FREE 10 ML: 5 INJECTION INTRAVENOUS at 21:18

## 2023-02-24 RX ADMIN — IPRATROPIUM BROMIDE AND ALBUTEROL SULFATE 1 AMPULE: 2.5; .5 SOLUTION RESPIRATORY (INHALATION) at 11:10

## 2023-02-24 RX ADMIN — TRAZODONE HYDROCHLORIDE 200 MG: 100 TABLET ORAL at 21:17

## 2023-02-24 RX ADMIN — POTASSIUM CHLORIDE 20 MEQ: 10 CAPSULE, COATED, EXTENDED RELEASE ORAL at 21:17

## 2023-02-24 RX ADMIN — BUDESONIDE AND FORMOTEROL FUMARATE DIHYDRATE 2 PUFF: 160; 4.5 AEROSOL RESPIRATORY (INHALATION) at 06:10

## 2023-02-24 RX ADMIN — IPRATROPIUM BROMIDE AND ALBUTEROL SULFATE 1 AMPULE: 2.5; .5 SOLUTION RESPIRATORY (INHALATION) at 15:22

## 2023-02-24 RX ADMIN — Medication 1 TABLET: at 16:52

## 2023-02-24 RX ADMIN — HYDROXYCHLOROQUINE SULFATE 300 MG: 200 TABLET ORAL at 09:27

## 2023-02-24 RX ADMIN — GUAIFENESIN 600 MG: 600 TABLET ORAL at 08:07

## 2023-02-24 RX ADMIN — PREGABALIN 300 MG: 100 CAPSULE ORAL at 09:27

## 2023-02-24 RX ADMIN — MONTELUKAST 10 MG: 10 TABLET, FILM COATED ORAL at 21:17

## 2023-02-24 RX ADMIN — SODIUM CHLORIDE, PRESERVATIVE FREE 10 ML: 5 INJECTION INTRAVENOUS at 08:07

## 2023-02-24 RX ADMIN — BUDESONIDE AND FORMOTEROL FUMARATE DIHYDRATE 2 PUFF: 160; 4.5 AEROSOL RESPIRATORY (INHALATION) at 20:11

## 2023-02-24 RX ADMIN — IPRATROPIUM BROMIDE AND ALBUTEROL SULFATE 1 AMPULE: 2.5; .5 SOLUTION RESPIRATORY (INHALATION) at 20:11

## 2023-02-24 RX ADMIN — CEFTRIAXONE SODIUM 1000 MG: 1 INJECTION, POWDER, FOR SOLUTION INTRAMUSCULAR; INTRAVENOUS at 21:09

## 2023-02-24 RX ADMIN — RIVAROXABAN 20 MG: 20 TABLET, FILM COATED ORAL at 08:07

## 2023-02-24 RX ADMIN — Medication 1 TABLET: at 08:07

## 2023-02-24 RX ADMIN — ASPIRIN 81 MG: 81 TABLET, COATED ORAL at 08:07

## 2023-02-24 RX ADMIN — IPRATROPIUM BROMIDE AND ALBUTEROL SULFATE 1 AMPULE: 2.5; .5 SOLUTION RESPIRATORY (INHALATION) at 06:10

## 2023-02-24 ASSESSMENT — PAIN SCALES - GENERAL
PAINLEVEL_OUTOF10: 0

## 2023-02-24 NOTE — PROGRESS NOTES
End Of Shift Note  [address] ICU  Summary of shift: Patient has been alert & oriented x4 and has been up to the chair since 0800. Patient worked with PT/OT, and tolerated well. Patients vital have remained stable throughout the shift. IV fluids d/c'd and remains on rocephin and Zithromax. PRN robitussin was ordered q4 for persistent, productive cough.       Vitals:    Vitals:    02/24/23 1300 02/24/23 1400 02/24/23 1500 02/24/23 1600   BP: 118/72 (!) 148/67  (!) 143/82   Pulse: 91 96 87 88   Resp: 15 17 22 12   Temp:    98.1 °F (36.7 °C)   TempSrc:    Oral   SpO2:  97%     Weight:       Height:            I&O:   Intake/Output Summary (Last 24 hours) at 2/24/2023 1818  Last data filed at 2/24/2023 1515  Gross per 24 hour   Intake 1146.46 ml   Output 2200 ml   Net -1053.54 ml       Resp Status: 2 L nasal canula     Ventilator Settings:     / / /FiO2 : (S) 30 %    Critical Care IV infusions:   sodium chloride          LDA:   Peripheral IV Left Antecubital (Active)   Number of days:        Puncture 08/25/22 Head (Active)   Number of days: 183       Wound 08/22/22 Face Left;Upper (Active)   Number of days: 185       Incision 08/23/22 Head Left (Active)   Number of days: 185

## 2023-02-24 NOTE — PROGRESS NOTES
Mo 2  PROGRESS NOTE    Room # 1103/1103-01   Name: Prieto Castañeda               Reason for visit: Routine    I visited the patient. Admit Date & Time: 2/22/2023  6:40 PM    Assessment:  Prieto Castañeda is a 69 y.o. female in the hospital because she has pneumonia. Upon entering the room patient was found resting in bed. She was alert, awake and open to visit. Intervention:  I introduced myself and my title as spiritual care provider I offered space for patient  to express feelings, needs, and concerns and provided a ministry presence. Patient is hopeful that she can go home soon. Patient is open to prayer, emotional support and companionship. Outcome:  Patient calm and coping. Plan:  Chaplains will remain available to offer spiritual and emotional support as needed. Electronically signed by Tyson Albarado on 2/23/2023 at [address]     02/23/23 2010   Encounter Summary   Service Provided For: Patient   [facility]   Support System Spouse; Children   Last Encounter  02/23/23   Complexity of Encounter Low   Begin Time 1900   End Time  1930   Total Time Calculated 30 min   Encounter    Type Initial Screen/Assessment   Spiritual/Emotional needs   Type Spiritual Support   Assessment/Intervention/Outcome   Assessment Calm;Coping; Impaired resilience   Intervention Active listening;Explored/Affirmed feelings, thoughts, concerns; Life review/Legacy;Prayer (assurance of)/[place];Sustaining Presence/Ministry of presence   Outcome Comfort;Coping;Expressed feelings, needs, and concerns;Receptive

## 2023-02-24 NOTE — RT PROTOCOL NOTE
RT Inhaler-Nebulizer Bronchodilator Protocol Note    There is a bronchodilator order in the chart from a provider indicating to follow the RT Bronchodilator Protocol and there is an Initiate RT Inhaler-Nebulizer Bronchodilator Protocol order as well (see protocol at bottom of note). CXR Findings:  XR CHEST PORTABLE    Result Date: 2/23/2023  Stable exam since yesterday given differences in radiographic technique and patient positioning. XR CHEST PORTABLE    Result Date: 2/22/2023  Diffuse pulmonary infiltrates concerning for pneumonia. The findings from the last RT Protocol Assessment were as follows:   History Pulmonary Disease: Chronic pulmonary disease  Respiratory Pattern: Mild dyspnea at rest, irregular pattern, or RR 21-25 bpm  Breath Sounds: Intermittent or unilateral wheezes  Cough: Strong, productive  Indication for Bronchodilator Therapy: Wheezing associated with pulm disorder  Bronchodilator Assessment Score: 11    Aerosolized bronchodilator medication orders have been revised according to the RT Inhaler-Nebulizer Bronchodilator Protocol below. Respiratory Therapist to perform RT Therapy Protocol Assessment initially then follow the protocol. Repeat RT Therapy Protocol Assessment PRN for score 0-3 or on second treatment, BID, and PRN for scores above 3. No Indications - adjust the frequency to every 6 hours PRN wheezing or bronchospasm, if no treatments needed after 48 hours then discontinue using Per Protocol order mode. If indication present, adjust the RT bronchodilator orders based on the Bronchodilator Assessment Score as indicated below.   Use Inhaler orders unless patient has one or more of the following: on home nebulizer, not able to hold breath for 10 seconds, is not alert and oriented, cannot activate and use MDI correctly, or respiratory rate 25 breaths per minute or more, then use the equivalent nebulizer order(s) with same Frequency and PRN reasons based on the score.  If a patient is on this medication at home then do not decrease Frequency below that used at home. 0-3 - enter or revise RT bronchodilator order(s) to equivalent RT Bronchodilator order with Frequency of every 4 hours PRN for wheezing or increased work of breathing using Per Protocol order mode. 4-6 - enter or revise RT Bronchodilator order(s) to two equivalent RT bronchodilator orders with one order with BID Frequency and one order with Frequency of every 4 hours PRN wheezing or increased work of breathing using Per Protocol order mode. 7-10 - enter or revise RT Bronchodilator order(s) to two equivalent RT bronchodilator orders with one order with TID Frequency and one order with Frequency of every 4 hours PRN wheezing or increased work of breathing using Per Protocol order mode. 11-13 - enter or revise RT Bronchodilator order(s) to one equivalent RT bronchodilator order with QID Frequency and an Albuterol order with Frequency of every 4 hours PRN wheezing or increased work of breathing using Per Protocol order mode. Greater than 13 - enter or revise RT Bronchodilator order(s) to one equivalent RT bronchodilator order with every 4 hours Frequency and an Albuterol order with Frequency of every 2 hours PRN wheezing or increased work of breathing using Per Protocol order mode. RT to enter RT Home Evaluation for COPD & MDI Assessment order using Per Protocol order mode.     Electronically signed by Buddy Traylor RCP on 2/24/2023 at 6:15 AM

## 2023-02-24 NOTE — PROGRESS NOTES
[facility]  Office: [address], DO, Radha Mcnally, DO, Sandra Nix, DO, Tim Suarez Blood, DO, Cliff Mendez MD, Keisha Morrow MD, Alan Tinsley MD, Gilberto Mcdaniel MD,  Jacques Lopez MD, Braeden Paulson MD, Nancy Wagner, DO, Florence Friend MD,  Bryan Patel MD, Paddy Jenkins MD, Trina Worrell, DO, Ernesto Bajwa MD, Anabel Cardoso MD, Nata Lam, DO, Mami Celaya MD, Tamar Payan MD, Angle Collins MD, Jose Muñoz MD, Dali Roberson DO, Rico Humphries MD, Miri Garcia MD, Lizandro Cosme, Loly Ricardo, CNP, Odilon Rose, CNP, Malorie Gandhi, CNP,  Mccoy Baumgarten, [facility], Casper Garcia, CNP, Shayla Garibay, CNP, Carlos Mcclain, CNP, Jesusita Germain, CNP, Daysi Hunter, CNP, Lexis Giang PAJuanitaC, [facility], CNS, Maynor London, [facility], [facility]    Progress Note    2/24/2023    9:53 AM    Name:   Christiano Wilson  MRN:     [id number]     Acct:      [de-identified]   Room:   [address] [place] [address] Day:  2  Admit Date:  2/22/2023  6:40 PM    PCP:   Lila Bhatia MD  Code Status:  Full Code    Subjective:     C/C: sob  Interval History Status: improved. Feels better today  Denies cp/n/v  Less sob and has some cough    Previously required bipap, now on o2 2L    Tells me she had recently been on antibiotics when admitted to different hospital    Brief History:     Per my LAUREN:  \"Patient presents to [facility] emergency department with complaints of shortness of breath. Patient states that her increased work of breathing started a few days ago and has progressively worsened. Patient has a significant past medical history of atrial fibrillation, coronary artery disease, COPD, depression, GERD, hypothyroidism, iron deficiency anemia, pulmonary fibrosis, subdural hematoma and is on home O2 at 2 L per nasal cannula.   Patient denies any recent fevers, chills, nausea, vomiting and diarrhea. Patient states that she does experience chest discomfort with coughing. She has a productive cough noted. Patient had attempted to take her different breathing treatments at home without improvement. Patient called 911 and was transported to [facility] emergency room. Patient was given 125 mg of IV Solu-Medrol as well as an albuterol treatment in route. Patient was also placed on CPAP with improving oxygenation. \"    Review of Systems:     Constitutional:  negative for chills, fevers, sweats  Respiratory:  positive for cough, dyspnea on exertion, shortness of breath  Cardiovascular:  negative for chest pain, chest pressure/discomfort, lower extremity edema, palpitations  Gastrointestinal:  negative for abdominal pain, constipation, diarrhea, nausea, vomiting  Neurological:  negative for dizziness, headache    Medications:      Allergies:  No Known Allergies    Current Meds:   Scheduled Meds:    budesonide-formoterol  2 puff Inhalation BID    montelukast  10 mg Oral Nightly    hydroxychloroquine  300 mg Oral Daily    potassium chloride  20 mEq Oral BID    aspirin  81 mg Oral Daily    calcium carb-cholecalciferol  1 tablet Oral BID WC    levothyroxine  200 mcg Oral Daily    pantoprazole  40 mg Oral BID AC    pregabalin  300 mg Oral BID    rivaroxaban  20 mg Oral Daily with breakfast    sodium chloride flush  5-40 mL IntraVENous 2 times per day    ipratropium-albuterol  1 ampule Inhalation Q4H WA    cefTRIAXone (ROCEPHIN) IV  1,000 mg IntraVENous Q24H    And    azithromycin  500 mg Oral Q24H    guaiFENesin  600 mg Oral BID    traZODone  200 mg Oral Nightly     Continuous Infusions:    sodium chloride 50 mL/hr at 02/24/23 5199    sodium chloride       PRN Meds: albuterol, HYDROcodone-acetaminophen, sodium chloride flush, sodium chloride, ondansetron **OR** ondansetron, magnesium hydroxide, acetaminophen **OR** acetaminophen    Data:     Past Medical History:   has a past medical history of A-fib [facility]), Biventricular congestive heart failure ([facility] Bronchiectasis with acute exacerbation [facility] CAD (coronary artery disease), Chronic pain syndrome, COPD (chronic obstructive pulmonary disease) ([facility] Depression, GERD (gastroesophageal reflux disease), Hypothyroidism, Impaired fasting glucose, Iron deficiency anemia, Osteoporosis, Pulmonary fibrosis ([facility] and Subdural hematoma. Social History:   reports that she quit smoking about 46 years ago. Her smoking use included cigarettes. She has a 10.00 pack-year smoking history. She has never used smokeless tobacco. She reports that she does not drink alcohol and does not use drugs. Family History:   Family History   Problem Relation Age of Onset    Heart Attack Father 61    Heart Attack Sister     Heart Disease Brother        Vitals:  /62   Pulse 94   Temp 97.8 °F (36.6 °C) (Oral)   Resp 21   Ht 5' 5\" (1.651 m)   Wt 156 lb 15.5 oz (71.2 kg)   SpO2 91%   BMI 26.12 kg/m²   Temp (24hrs), Av.9 °F (36.6 °C), Min:97.1 °F (36.2 °C), Max:98.4 °F (36.9 °C)    No results for input(s): POCGLU in the last 72 hours. I/O (24Hr):     Intake/Output Summary (Last 24 hours) at 2023 0953  Last data filed at 2023 5239  Gross per 24 hour   Intake 1146.46 ml   Output 2900 ml   Net -1753.54 ml       Labs:  Hematology:  Recent Labs     23  0904 23  1947 23  0546 23  0337   WBC 12.1*  --  18.0* 13.9*   RBC 5.13*  --  4.09 4.13   HGB 16.0*  --  12.2 12.5   HCT 48.3*  --  39.0 39.9   MCV 94.2  --  95.4 96.6   MCH 31.2  --  29.8 30.3   MCHC 33.1  --  31.3 31.3   RDW 15.0*  --  15.6* 15.7*     --  159 167   MPV 9.6  --  9.2 10.5   CRP  --  76.2*  --   --      Chemistry:  Recent Labs     23  0904 23  0546 23  0337    141 141   K 3.7 4.0 3.9    110* 106   CO2 27 25 25   GLUCOSE 98 94 69*   BUN 15 14 11   CREATININE 0.77 0.49* 0.59   ANIONGAP 10 6* 10   LABGLOM >60 >60 >60   CALCIUM 9.1 8.4* 9.3 PROBNP 79  --   --    TROPHS 17* 14  --      Recent Labs     02/22/23  0904   PROT 6.9   LABALBU 4.1   AST 20   ALT 14   ALKPHOS 77   BILITOT 0.3     ABG:  Lab Results   Component Value Date/Time    POCPH 7.424 08/25/2022 05:15 AM    PHART 7.400 10/14/2022 07:55 AM    POCPCO2 35.2 08/25/2022 05:15 AM    RUO7YHN 51.4 10/14/2022 07:55 AM    POCPO2 75.9 08/25/2022 05:15 AM    PO2ART 99.0 10/14/2022 07:55 AM    POCHCO3 23.0 08/25/2022 05:15 AM    VAJ9CGJ 31.1 10/14/2022 07:55 AM    NBEA 1 08/25/2022 05:15 AM    PBEA 5.0 10/14/2022 07:55 AM    DDS9EPH 35 11/02/2020 04:16 AM    LQEY9LZT 96 08/25/2022 05:15 AM    J6OIRIDF 97.4 10/14/2022 07:55 AM    FIO2 28 10/10/2022 05:30 AM     Lab Results   Component Value Date/Time    SPECIAL 5ML RT HAND 02/22/2023 09:40 AM     Lab Results   Component Value Date/Time    CULTURE PENDING 02/23/2023 12:22 PM       Radiology:  XR CHEST PORTABLE    Result Date: 2/23/2023  Stable exam since yesterday given differences in radiographic technique and patient positioning. XR CHEST PORTABLE    Result Date: 2/22/2023  Diffuse pulmonary infiltrates concerning for pneumonia. Echo 2/23/23:  CONCLUSIONS     Summary  Mild left ventricular hypertrophy  Global left ventricular systolic function is hyperdynamic  Estimated ejection fraction is 80 % . Increased LVOT velocities consistent with LVOT obstruction. Aortic valve is trileaflet. Aortic valve structure and function normal.  No aortic insufficiency. Mild tricuspid regurgitation. Mild pulmonary hypertension. No pericardial effusion seen. Normal aortic root dimension.       Physical Examination:        General appearance:  alert, cooperative and no distress  Mental Status:  oriented to person, place and time and normal affect  Lungs:  coarse LLL, normal effort  Heart:  regular rate and rhythm, no murmur  Abdomen:  soft, nontender, nondistended, normal bowel sounds, no masses, hepatomegaly, splenomegaly  Extremities:  no edema, redness, tenderness in the calves  Skin:  no gross lesions, rashes, induration    Assessment:        Hospital Problems             Last Modified POA    * (Principal) Multifocal pneumonia 2/22/2023 Yes    Pulmonary fibrosis (HCC) (Chronic) 2/22/2023 Yes    History of subdural hematoma 2/22/2023 Yes    Dyspnea, unspecified 2/22/2023 Yes    GERD (gastroesophageal reflux disease) 2/22/2023 Yes    Overview Signed 2/22/2023  1:20 PM by Glenis Lesches, APRN - CNP     Formatting of this note might be different from the original.  POA: Yes  - this is POA and being treated with home medications         Oxygen dependent 2/22/2023 Yes    Acute on chronic respiratory failure with hypoxia ([address] 2/22/2023 Yes    Hypothyroidism (Chronic) 2/22/2023 Yes    Lactic acidosis 2/22/2023 Yes    COPD (chronic obstructive pulmonary disease) ([address] (Chronic) 2/22/2023 Yes    A-fib ([address] 2/22/2023 Yes       Plan:        Bipap prn, currently on nc 2L o2 and comfortable (home dose)  Cont iv antibiotics with rocephin/zithromax; she was on zosyn/levaquin followed by zpak back in October  Transfer out of icu today  Pt/ot/mobilize  Outpatient cardio follow up of lvoto on echo-does not seem to be causing any current complications  Anticipate discharge 24-48h    Michelle 83 Blood, DO  2/24/2023  9:53 AM

## 2023-02-24 NOTE — PROGRESS NOTES
Physician Progress Note      PATIENT:               Umang Antonio  CSN #:                  [id number]  :                       1945  ADMIT DATE:       2023 6:40 PM  [address] [place] DATE:  Denifranklinabdifatah Metzger  PROVIDER #:        Michelle HALL DO          QUERY TEXT:    Pt admitted with pneumonia. Pt noted to have elevated WBC. If possible, please   document in the progress notes and discharge summary if you are evaluating   and /or treating any of the following: The medical record reflects the following:  Risk Factors: PNA  Clinical Indicators: Wbc of 12.1, LA of 3.0, patient with acute respiratory   failure, temp of 101.4 on admission, tachypneic with RR as high as 37,   tachycardic with HR as high as 149  Treatment: IVF, IV Zithromax, IV Rocephin, labs    Thank you,  Carlos Guadalupe RN  Options provided:  -- Sepsis, present on admission  -- Sepsis was ruled out  -- Other - I will add my own diagnosis  -- Disagree - Not applicable / Not valid  -- Disagree - Clinically unable to determine / Unknown  -- Refer to Clinical Documentation Reviewer    PROVIDER RESPONSE TEXT:    This patient has sepsis which was present on admission. Query created by:  Gio Catherine on 2023 4:48 AM      Electronically signed by:  Michelle HALL DO 2023 4:47 PM

## 2023-02-24 NOTE — PROGRESS NOTES
Pulmonary Critical Care Progress Note  Zach Steven MD     Patient seen for the follow up of  Multifocal pneumonia     Subjective:  She is doing well today. She is currently up ambulating in her room with physical therapy. Her shortness of breath is much better. She is on oxygen 2 L saturating in the mid 90s. She has occasional productive cough, no hemoptysis. She denies chest pain. She did not require BiPAP overnight. Examination:  Vitals: BP (!) 147/113   Pulse (!) 117   Temp 97.8 °F (36.6 °C) (Oral)   Resp 16   Ht 5' 5\" (1.651 m)   Wt 156 lb 15.5 oz (71.2 kg)   SpO2 93%   BMI 26.12 kg/m²   General appearance: alert and cooperative with exam  Neck: No JVD  Lungs: Moderate air exchange, occasional crackles, no wheezing  Heart: regular rate and rhythm, S1, S2 normal, no gallop  Abdomen: Soft, non tender, + BS  Extremities: no cyanosis or clubbing.  No significant edema    LABs:  CBC:   Recent Labs     02/22/23  0904 02/23/23  0546 02/24/23  0337   WBC 12.1* 18.0* 13.9*   HGB 16.0* 12.2 12.5   HCT 48.3* 39.0 39.9    159 167     BMP:   Recent Labs     02/22/23  0904 02/23/23  0546 02/24/23  0337    141 141   K 3.7 4.0 3.9   CO2 27 25 25   BUN 15 14 11   CREATININE 0.77 0.49* 0.59   LABGLOM >60 >60 >60   GLUCOSE 98 94 69*     LIVER PROFILE:  Recent Labs     02/22/23  0904   AST 20   ALT 14   LABALBU 4.1     ABG:  Lab Results   Component Value Date/Time    PHART 7.400 10/14/2022 07:55 AM    XJT2FTW 51.4 10/14/2022 07:55 AM    PO2ART 99.0 10/14/2022 07:55 AM    ESL4CAZ 31.1 10/14/2022 07:55 AM    JVY7QOF 35 11/02/2020 04:16 AM    S2ELXIOO 97.4 10/14/2022 07:55 AM    FIO2 28 10/10/2022 05:30 AM       Lab Results   Component Value Date/Time    POCPH 7.424 08/25/2022 05:15 AM    POCPCO2 35.2 08/25/2022 05:15 AM    POCPO2 75.9 08/25/2022 05:15 AM    POCHCO3 23.0 08/25/2022 05:15 AM    PBEA 5.0 10/14/2022 07:55 AM    PQZ7EQF 35 11/02/2020 04:16 AM    NPTR7PGD 96 08/25/2022 05:15 AM    FIO2 28 10/10/2022 05:30 AM      Latest Reference Range & Units 2/23/23 11:48   Procalcitonin <0.09 ng/mL 1.70 (H)   (H): Data is abnormally high    Radiology:  CT chest 2/23/2023  Findings most compatible with multifocal pneumonia. Consider both typical   and atypical etiologies, including viral pneumonia. Some of the areas of the consolidation has a nodular morphology, and   therefore this process should be followed to resolution with CT technique to   ensure resolution. There is a background of multifocal fibrosis throughout both lungs, most   likely from a remote episode of pneumonia. Moderate-sized hiatal hernia. Gastric varices are suggested adjacent to   that, etiology unclear. Mild mural thickening of the distal esophagus, which may reflect esophagitis.          Impression:  Acute on chronic hypoxic respiratory failure  Acute exacerbation of bronchiectasis  Pulmonary fibrosis  Acute exacerbation of COPD  Multifocal pneumonia    Recommendations:  Oxygen via nasal cannula currently at her baseline of 2 L  BiPAP support if necessary  Continue IV antibiotics, Rocephin/Zithromax  Albuterol and Ipratropium Q 4 hours and prn  Symbicort  Mucinex  Singulair   DVT prophylaxis, on Xarelto  Follow-up CT chest as an outpatient  Discharge planning in 24 to 48 hours  Will follow with you    Electronically signed by     Nilson Duncan MD on 2/24/2023 at 4:11 PM  Pulmonary Critical Care and Sleep Medicine,  [facility]  Cell: [phone]  Office: [phone]

## 2023-02-24 NOTE — PLAN OF CARE
Problem: Chronic Conditions and Co-morbidities  Goal: Patient's chronic conditions and co-morbidity symptoms are monitored and maintained or improved  Outcome: Progressing  Flowsheets (Taken 2/23/2023 2000)  Care Plan - Patient's Chronic Conditions and Co-Morbidity Symptoms are Monitored and Maintained or Improved:   Monitor and assess patient's chronic conditions and comorbid symptoms for stability, deterioration, or improvement   Collaborate with multidisciplinary team to address chronic and comorbid conditions and prevent exacerbation or deterioration   Update acute care plan with appropriate goals if chronic or comorbid symptoms are exacerbated and prevent overall improvement and discharge     Problem: Discharge Planning  Goal: Discharge to home or other facility with appropriate resources  Outcome: Progressing  Flowsheets (Taken 2/23/2023 2000)  Discharge to home or other facility with appropriate resources:   Identify barriers to discharge with patient and caregiver   Arrange for needed discharge resources and transportation as appropriate   Identify discharge learning needs (meds, wound care, etc)   Refer to discharge planning if patient needs post-hospital services based on physician order or complex needs related to functional status, cognitive ability or social support system     Problem: Pain  Goal: Verbalizes/displays adequate comfort level or baseline comfort level  Outcome: Progressing     Problem: Respiratory - Adult  Goal: Achieves optimal ventilation and oxygenation  2/24/2023 0402 by Aleksey Roberson RN  Outcome: Progressing  Flowsheets (Taken 2/23/2023 2000)  Achieves optimal ventilation and oxygenation:   Assess for changes in respiratory status   Assess for changes in mentation and behavior   Position to facilitate oxygenation and minimize respiratory effort   Oxygen supplementation based on oxygen saturation or arterial blood gases   Encourage broncho-pulmonary hygiene including cough, deep breathe, incentive spirometry   Assess the need for suctioning and aspirate as needed   Assess and instruct to report shortness of breath or any respiratory difficulty   Respiratory therapy support as indicated  2/23/2023 1823 by Diaz Form, RCP  Outcome: Progressing  Goal: Able to breathe comfortably  2/23/2023 1823 by Diaz Form, RCP  Outcome: Progressing     Problem: Skin/Tissue Integrity  Goal: Absence of new skin breakdown  Description: 1. Monitor for areas of redness and/or skin breakdown  2. Assess vascular access sites hourly  3. Every 4-6 hours minimum:  Change oxygen saturation probe site  4. Every 4-6 hours:  If on nasal continuous positive airway pressure, respiratory therapy assess nares and determine need for appliance change or resting period.   Outcome: Progressing     Problem: Safety - Adult  Goal: Free from fall injury  Outcome: Progressing  Flowsheets (Taken 2/23/2023 2000)  Free From Fall Injury:   Instruct family/caregiver on patient safety   Based on caregiver fall risk screen, instruct family/caregiver to ask for assistance with transferring infant if caregiver noted to have fall risk factors     Problem: ABCDS Injury Assessment  Goal: Absence of physical injury  Outcome: Progressing  Flowsheets (Taken 2/23/2023 2000)  Absence of Physical Injury: Implement safety measures based on patient assessment

## 2023-02-24 NOTE — PROGRESS NOTES
Physical Therapy  Facility/Department: [facility] ICU  Daily Treatment Note  NAME: Jose L Benito  : 1945  MRN: [id number]    Date of Service: 2023    Discharge Recommendations:  Due to recent hospitalization and medical condition, pt would benefit from additional intermittent skilled therapy at time of discharge. Please refer to the AM-PAC score for current functional status. Patient would benefit from continued therapy after discharge        Patient Diagnosis(es): There were no encounter diagnoses. Assessment   Assessment: Pt demos progress toward goals; achieved STG #5. Pt walks 4 laps around room w/RW SBA; requires assistance for line management. SpO2 fluctuates between 89-94% throughout functional mobility. Pt performs seated BLE TherEx x 15 reps w/good tolerance. Pt reports feeling better after moving around. SpO2 96% at end of Tx. Pt would benefit from continued skilled PT services to improve independence and return to PLOF. Activity Tolerance: Patient tolerated treatment well;Patient limited by endurance     Plan    Physcial Therapy Plan  General Plan: 5-7 times per week  Current Treatment Recommendations: Strengthening;Balance training;Functional mobility training;Transfer training;ADL/Self-care training; Endurance training;Gait training;Stair training;Home exercise program;Neuromuscular re-education; Safety education & training;Patient/Caregiver education & training;Positioning  Additional Comments: Posture     Restrictions  Restrictions/Precautions  Restrictions/Precautions: General Precautions, Fall Risk  Position Activity Restriction  Other position/activity restrictions: Up as tolerated, RUE IV, heels off bed @ all times, 2LO2 via NC, ALARMS     Subjective    Subjective  Subjective: Pt seated upright in chair upon arrival,  present. MARVIN Jackson reports that pt is medically stable and appropriate for therapy.  Pt agreeable to therapy, stating that walking makes her legs feel better. Orientation  Overall Orientation Status: Within Functional Limits  Orientation Level: Oriented X4  Cognition  Overall Cognitive Status: Exceptions  Arousal/Alertness: Appropriate responses to stimuli  Following Commands: Follows all commands without difficulty  Attention Span: Appears intact  Memory: Appears intact  Safety Judgement: Decreased awareness of need for assistance;Decreased awareness of need for safety  Problem Solving: Decreased awareness of errors;Assistance required to identify errors made  Insights: Decreased awareness of deficits  Initiation: Does not require cues  Sequencing: Does not require cues  Cognition Comment: Pt transfers from initial STS w/o instruction, demonstrating impulsivity. Objective      Bed Mobility Training  Bed Mobility Training: No (Pt in bedside chair upon arrival and returned to chair to finish therapy.)  Balance  Sitting: Intact  Standing: With support (SBA w/RW)  Transfer Training  Transfer Training: Yes  Overall Level of Assistance: Minimum assistance (MIN A for line awareness/management)  Interventions: Verbal cues; Safety awareness training; Tactile cues (MOD cues for transfer sequencing, self pacing, RW management, and pursed lip breathing all to promote safety.)  Sit to Stand: Minimum assistance (Pt demos proper hand placement)  Stand to Sit: Minimum assistance (Pt demos good eccentric quad control)  Gait Training  Gait Training: Yes  Gait  Overall Level of Assistance: Stand-by assistance;Minimum assistance (SBA for general safety awareness; MIN A for line management.)  Interventions: Verbal cues; Safety awareness training; Tactile cues (MOD cues for self pacing, pursed lip breathing, RW management, and scanning of environment all to promote safety.)  Base of Support: Widened  Speed/Helen: Shuffled  Step Length: Right shortened;Left shortened  Distance: 80'  Assistive Device: Walker, rolling;Gait belt     PT Exercises  Exercise Treatment: Pt demos good tolerance of seated TherEx, maintaining stable vitals throughout. A/AROM Exercises: Kerney Gearing, hip ab/adduction x15 reps ea  Circulation/Endurance Exercises: Ankle pumps x15 reps  Pressure Relief Exercises: Glute sets x15 reps  Postural Correction Exercises: Scapular retraction, chin tucks x15 reps ea  Breathing Techniques: Pursed lip breathing techniques  & deep breathing with incentive spirometer including technique, frequency and purpose  Exercise Equipment: Incentive spirometer     Safety Devices  Type of Devices: Call light within reach; Chair alarm in place;Gait belt;Patient at risk for falls; Left in chair;Nurse notified; All fall risk precautions in place       Goals  Short Term Goals  Time Frame for Short Term Goals: 12 visits  Short Term Goal 1: Inc bed-mobility & transfers to independent to enable pt to safely get in/OOB & chair to return to PLOF & decrease risk for falls  Short Term Goal 2: Inc gait to amb 300ft or > indep w/ RW with appropriate pacing techniques to enable pt to return to previous level of independence & able to demonstrate indep/ safe use of RW in functional activities including approaching surfaces and turning to sit. Short Term Goal 3: Pt able to go up/down 3 steps with Italo rails independently  Short Term Goal 4: Inc standing balance to good with device to facilitate pt independence for performance of ADL's & functional mobility, & reduce fall risk  Short Term Goal 5: Pt able to tolerate 30-40 min of activity to include 15-20 reps of ex, NMR & functional mobility with device to facilitate activity tolerance to [facility]  Additional Goals?: Yes  Short Term Goal 6: Ed pt on home ex's, safety, balance & endurance training, energy conservation(planning, pacing, prioritizing & positioning), self regulating breathing techniques, fall prevention, & issue written pt education    Education  Patient Education  Education Given To: Patient; Family  Education Provided: Role of Therapy;Plan of Care;Fall Prevention Strategies;Transfer Training;Energy Conservation;Precautions; Equipment  Education Provided Comments: Pt educated on importance of continued mobility throughout admission, safety awareness, fall risk prevention, safe transfers & ambulation w/RW, appropriate pacing, seated TherEx, postural correction, breathing techniques, and PT POC. Pt demonstrated fair carryover. Pt requires continued reinforcement of education. Education Method: Demonstration;Verbal  Education Outcome: Continued education needed;Verbalized understanding    AM-PAC Scores  AM-PAC Inpatient Mobility Raw Score: 16  AM-PAC Inpatient T-Scale Score: 40.78  Mobility Inpatient CMS 0-100% Score: 54.16  Mobility Inpatient CMS G-Code Modifier: CK    Therapy Time   Individual Concurrent Group Co-treatment   Time In 1355         Time Out 1436         Minutes 41               Treatment & documentation completed by Judi Squires, Student Physical Therapist Assistant  co-signed by Shantelle Piña PTA   I am the clinical instructor and have supervised the student in provision of therapy services. I have reviewed the clinical documentation and am responsible for the care provided under the therapy plan of care.

## 2023-02-24 NOTE — PLAN OF CARE
Problem: Skin/Tissue Integrity  Goal: Absence of new skin breakdown  Description: 1. Monitor for areas of redness and/or skin breakdown  2. Assess vascular access sites hourly  3. Every 4-6 hours minimum:  Change oxygen saturation probe site  4. Every 4-6 hours:  If on nasal continuous positive airway pressure, respiratory therapy assess nares and determine need for appliance change or resting period.   2/24/2023 1500 by Jordana Petty RN  Outcome: Progressing     Problem: Safety - Adult  Goal: Free from fall injury  2/24/2023 1500 by Jordana Petty RN  Outcome: Progressing     Problem: ABCDS Injury Assessment  Goal: Absence of physical injury  2/24/2023 1500 by Jordana Petty RN  Outcome: Progressing

## 2023-02-24 NOTE — PLAN OF CARE
Problem: Respiratory - Adult  Goal: Achieves optimal ventilation and oxygenation  2/24/2023 0807 by Janna Silver RCP  Outcome: Progressing     Problem: Respiratory - Adult  Goal: Able to breathe comfortably  2/24/2023 0807 by Janna Silver RCP  Outcome: Progressing

## 2023-02-24 NOTE — PROGRESS NOTES
End Of Shift Note  [address] ICU  Summary of shift: Patient had uneventful night, VVS. Patient cough a lot of phlegm up, and used suction to remove any phlegm. Pt c/o swelling in legs and did not like EPC cuffs, placed DUSTY hose on pt Bilaterally.  Gave one dose of Norco for back pain    Vitals:    Vitals:    02/24/23 0500 02/24/23 0600 02/24/23 0608 02/24/23 0619   BP: 131/69 131/67     Pulse: 71 63 68 64   Resp: 17 12 12 13   Temp:       TempSrc:       SpO2: 95% 100% 97%    Weight:       Height:            I&O:   Intake/Output Summary (Last 24 hours) at 2/24/2023 0631  Last data filed at 2/24/2023 5305  Gross per 24 hour   Intake 1722.29 ml   Output 2900 ml   Net -1177.71 ml       Resp Status: 2L NC, refused Bipap for the night     Ventilator Settings:     / / /FiO2 : (S) 30 %    Critical Care IV infusions:   sodium chloride 50 mL/hr at 02/24/23 0629    sodium chloride          LDA:   Peripheral IV Left Antecubital (Active)   Number of days:        Puncture 08/25/22 Head (Active)   Number of days: 182       Wound 08/22/22 Face Left;Upper (Active)   Number of days: 185       Incision 08/23/22 Head Left (Active)   Number of days: 184

## 2023-02-25 ENCOUNTER — APPOINTMENT (OUTPATIENT)
Dept: GENERAL RADIOLOGY | Age: 78
End: 2023-02-25
Attending: INTERNAL MEDICINE
Payer: MEDICARE

## 2023-02-25 VITALS
HEIGHT: 65 IN | TEMPERATURE: 97.3 F | SYSTOLIC BLOOD PRESSURE: 115 MMHG | HEART RATE: 92 BPM | BODY MASS INDEX: 26.15 KG/M2 | WEIGHT: 156.97 LBS | RESPIRATION RATE: 18 BRPM | OXYGEN SATURATION: 94 % | DIASTOLIC BLOOD PRESSURE: 66 MMHG

## 2023-02-25 LAB
MICROORGANISM SPEC CULT: ABNORMAL
MICROORGANISM/AGENT SPEC: ABNORMAL
SPECIMEN DESCRIPTION: ABNORMAL

## 2023-02-25 PROCEDURE — 6360000002 HC RX W HCPCS: Performed by: NURSE PRACTITIONER

## 2023-02-25 PROCEDURE — 6370000000 HC RX 637 (ALT 250 FOR IP): Performed by: NURSE PRACTITIONER

## 2023-02-25 PROCEDURE — 94761 N-INVAS EAR/PLS OXIMETRY MLT: CPT

## 2023-02-25 PROCEDURE — 6370000000 HC RX 637 (ALT 250 FOR IP): Performed by: INTERNAL MEDICINE

## 2023-02-25 PROCEDURE — 2580000003 HC RX 258: Performed by: NURSE PRACTITIONER

## 2023-02-25 PROCEDURE — 2700000000 HC OXYGEN THERAPY PER DAY

## 2023-02-25 PROCEDURE — 94640 AIRWAY INHALATION TREATMENT: CPT

## 2023-02-25 PROCEDURE — 71045 X-RAY EXAM CHEST 1 VIEW: CPT

## 2023-02-25 PROCEDURE — 99239 HOSP IP/OBS DSCHRG MGMT >30: CPT | Performed by: NURSE PRACTITIONER

## 2023-02-25 RX ORDER — IPRATROPIUM BROMIDE AND ALBUTEROL SULFATE 2.5; .5 MG/3ML; MG/3ML
1 SOLUTION RESPIRATORY (INHALATION) 3 TIMES DAILY
Status: DISCONTINUED | OUTPATIENT
Start: 2023-02-25 | End: 2023-02-25 | Stop reason: HOSPADM

## 2023-02-25 RX ORDER — MONTELUKAST SODIUM 10 MG/1
10 TABLET ORAL NIGHTLY
Qty: 30 TABLET | Refills: 3 | Status: SHIPPED | OUTPATIENT
Start: 2023-02-25 | End: 2023-08-14

## 2023-02-25 RX ORDER — GUAIFENESIN/DEXTROMETHORPHAN 100-10MG/5
5 SYRUP ORAL EVERY 4 HOURS PRN
Qty: 120 ML | Refills: 0 | Status: SHIPPED | OUTPATIENT
Start: 2023-02-25 | End: 2023-03-07

## 2023-02-25 RX ORDER — AZITHROMYCIN 500 MG/1
500 TABLET, FILM COATED ORAL DAILY
Qty: 3 TABLET | Refills: 0 | Status: SHIPPED | OUTPATIENT
Start: 2023-02-25 | End: 2023-02-28

## 2023-02-25 RX ORDER — PREDNISONE 10 MG/1
TABLET ORAL
Qty: 30 TABLET | Refills: 0 | Status: SHIPPED | OUTPATIENT
Start: 2023-02-25 | End: 2023-03-16

## 2023-02-25 RX ORDER — AMOXICILLIN AND CLAVULANATE POTASSIUM 562.5; 437.5; 62.5 MG/1; MG/1; MG/1
1 TABLET, MULTILAYER, EXTENDED RELEASE ORAL 2 TIMES DAILY
Qty: 20 TABLET | Refills: 0 | Status: SHIPPED | OUTPATIENT
Start: 2023-02-25 | End: 2023-03-07

## 2023-02-25 RX ORDER — GUAIFENESIN 600 MG/1
600 TABLET, EXTENDED RELEASE ORAL 2 TIMES DAILY
Qty: 30 TABLET | Refills: 3 | Status: SHIPPED | OUTPATIENT
Start: 2023-02-25

## 2023-02-25 RX ADMIN — BUDESONIDE AND FORMOTEROL FUMARATE DIHYDRATE 2 PUFF: 160; 4.5 AEROSOL RESPIRATORY (INHALATION) at 06:10

## 2023-02-25 RX ADMIN — LEVOTHYROXINE SODIUM 200 MCG: 0.1 TABLET ORAL at 06:32

## 2023-02-25 RX ADMIN — GUAIFENESIN AND DEXTROMETHORPHAN 5 ML: 100; 10 SYRUP ORAL at 09:28

## 2023-02-25 RX ADMIN — PREGABALIN 300 MG: 100 CAPSULE ORAL at 09:29

## 2023-02-25 RX ADMIN — GUAIFENESIN 600 MG: 600 TABLET ORAL at 09:29

## 2023-02-25 RX ADMIN — HYDROXYCHLOROQUINE SULFATE 300 MG: 200 TABLET ORAL at 09:29

## 2023-02-25 RX ADMIN — SODIUM CHLORIDE, PRESERVATIVE FREE 10 ML: 5 INJECTION INTRAVENOUS at 09:32

## 2023-02-25 RX ADMIN — IPRATROPIUM BROMIDE AND ALBUTEROL SULFATE 1 AMPULE: 2.5; .5 SOLUTION RESPIRATORY (INHALATION) at 09:45

## 2023-02-25 RX ADMIN — ASPIRIN 81 MG: 81 TABLET, COATED ORAL at 09:30

## 2023-02-25 RX ADMIN — PANTOPRAZOLE SODIUM 40 MG: 40 TABLET, DELAYED RELEASE ORAL at 06:29

## 2023-02-25 RX ADMIN — ALBUTEROL SULFATE 2.5 MG: 2.5 SOLUTION RESPIRATORY (INHALATION) at 01:59

## 2023-02-25 RX ADMIN — POTASSIUM CHLORIDE 20 MEQ: 10 CAPSULE, COATED, EXTENDED RELEASE ORAL at 09:29

## 2023-02-25 RX ADMIN — Medication 1 TABLET: at 09:35

## 2023-02-25 RX ADMIN — RIVAROXABAN 20 MG: 20 TABLET, FILM COATED ORAL at 09:34

## 2023-02-25 RX ADMIN — IPRATROPIUM BROMIDE AND ALBUTEROL SULFATE 1 AMPULE: 2.5; .5 SOLUTION RESPIRATORY (INHALATION) at 06:09

## 2023-02-25 NOTE — PROGRESS NOTES
Pulmonary Critical Care Progress Note       Patient seen for the follow up of  Multifocal pneumonia     Subjective:  She is sitting in chair on oxygen at 2 L nasal cannula. Her shortness of breath is improved. She has been using oxygen at home at night only. She has occasional productive cough, no hemoptysis. She denies chest pain. She did not require BiPAP overnight. She reports she has an appointment at [facility] today to adjust/fill pain pump    Examination:  Vitals: BP 96/63   Pulse 84   Temp 97.3 °F (36.3 °C) (Temporal)   Resp 11   Ht 5' 5\" (1.651 m)   Wt 156 lb 15.5 oz (71.2 kg)   SpO2 95%   BMI 26.12 kg/m²   General appearance: alert and cooperative with exam  Neck: No JVD  Lungs: Decreased breath sound mild basilar crackles  Heart: regular rate and rhythm, S1, S2 normal, no gallop  Abdomen: Soft, non tender, + BS  Extremities: no cyanosis or clubbing.  No significant edema    LABs:  CBC:   Recent Labs     02/23/23  0546 02/24/23  0337   WBC 18.0* 13.9*   HGB 12.2 12.5   HCT 39.0 39.9    167       BMP:   Recent Labs     02/23/23  0546 02/24/23  0337    141   K 4.0 3.9   CO2 25 25   BUN 14 11   CREATININE 0.49* 0.59   LABGLOM >60 >60   GLUCOSE 94 69*         ABG:  Lab Results   Component Value Date/Time    PHART 7.400 10/14/2022 07:55 AM    YQT7DSB 51.4 10/14/2022 07:55 AM    PO2ART 99.0 10/14/2022 07:55 AM    BQG0WJU 31.1 10/14/2022 07:55 AM    WSJ4APN 35 11/02/2020 04:16 AM    O5MJDSBY 97.4 10/14/2022 07:55 AM    FIO2 28 10/10/2022 05:30 AM       Lab Results   Component Value Date/Time    POCPH 7.424 08/25/2022 05:15 AM    POCPCO2 35.2 08/25/2022 05:15 AM    POCPO2 75.9 08/25/2022 05:15 AM    POCHCO3 23.0 08/25/2022 05:15 AM    PBEA 5.0 10/14/2022 07:55 AM    LIP5WNX 35 11/02/2020 04:16 AM    APVW9YPY 96 08/25/2022 05:15 AM    FIO2 28 10/10/2022 05:30 AM      Latest Reference Range & Units 2/23/23 11:48   Procalcitonin <0.09 ng/mL 1.70 (H)   (H): Data is abnormally high    Radiology:  Chest x-ray 2/25    Multifocal opacities in both lungs are again noted. The distribution is   stable         CT chest 2/23/2023  Findings most compatible with multifocal pneumonia. Consider both typical   and atypical etiologies, including viral pneumonia. Some of the areas of the consolidation has a nodular morphology, and   therefore this process should be followed to resolution with CT technique to   ensure resolution. There is a background of multifocal fibrosis throughout both lungs, most   likely from a remote episode of pneumonia. Moderate-sized hiatal hernia. Gastric varices are suggested adjacent to   that, etiology unclear. Mild mural thickening of the distal esophagus, which may reflect esophagitis.          Impression:  Acute on chronic hypoxic respiratory failure  Acute exacerbation of bronchiectasis  Pulmonary fibrosis  Acute exacerbation of COPD  Multifocal pneumonia    Recommendations:  Oxygen via nasal cannula currently at her baseline of 2 L  BiPAP support if necessary  Incentive spirometry every hour while awake  Albuterol and Ipratropium Q 4 hours and prn  Symbicort  Mucinex  Singulair    on Xarelto  Rocephin Zithromax switch to oral antibiotic/Augmentin on discharge  Robitussin  Follow-up CT chest as an outpatient  Discussed with RN  Discussed with family at bedside  Okay to discharge today to [place] clinic to adjust pain pump  Peptic ulcer disease prophylaxis  DVT prophylaxis on Xarelto    Electronically signed by     Shari Arvizu MD on 2/25/2023 at 1:42 PM  Pulmonary [address] and Sleep Medicine,  [facility]: [phone]

## 2023-02-25 NOTE — PLAN OF CARE
Problem: Respiratory - Adult  Goal: Able to breathe comfortably  2/25/2023 0825 by Andra Hernandez RCP  Outcome: Progressing     Problem: Respiratory - Adult  Goal: Able to breathe comfortably  2/25/2023 0825 by Andra Hernandez RCP  Outcome: Progressing     Problem: Chronic Conditions and Co-morbidities  Goal: Patient's chronic conditions and co-morbidity symptoms are monitored and maintained or improved  Outcome: Completed  Flowsheets (Taken 2/25/2023 0818)  Care Plan - Patient's Chronic Conditions and Co-Morbidity Symptoms are Monitored and Maintained or Improved:   Monitor and assess patient's chronic conditions and comorbid symptoms for stability, deterioration, or improvement   Collaborate with multidisciplinary team to address chronic and comorbid conditions and prevent exacerbation or deterioration   Update acute care plan with appropriate goals if chronic or comorbid symptoms are exacerbated and prevent overall improvement and discharge     Problem: Discharge Planning  Goal: Discharge to home or other facility with appropriate resources  Outcome: Completed  Flowsheets (Taken 2/25/2023 0818)  Discharge to home or other facility with appropriate resources:   Identify barriers to discharge with patient and caregiver   Arrange for needed discharge resources and transportation as appropriate   Identify discharge learning needs (meds, wound care, etc)   Arrange for interpreters to assist at discharge as needed   Refer to discharge planning if patient needs post-hospital services based on physician order or complex needs related to functional status, cognitive ability or social support system     Problem: Pain  Goal: Verbalizes/displays adequate comfort level or baseline comfort level  Outcome: Completed  Flowsheets (Taken 2/25/2023 0818)  Verbalizes/displays adequate comfort level or baseline comfort level:   Encourage patient to monitor pain and request assistance   Assess pain using appropriate pain scale Implement non-pharmacological measures as appropriate and evaluate response   Administer analgesics based on type and severity of pain and evaluate response   Consider cultural and social influences on pain and pain management   Notify Licensed Independent Practitioner if interventions unsuccessful or patient reports new pain     Problem: Respiratory - Adult  Goal: Achieves optimal ventilation and oxygenation  2/25/2023 1348 by Sriram Lopez RN  Outcome: Completed  2/25/2023 0825 by Norman Bellamy RCP  Outcome: Progressing  Flowsheets (Taken 2/25/2023 0818 by Sriram Lopez RN)  Achieves optimal ventilation and oxygenation: Assess for changes in respiratory status     Problem: Skin/Tissue Integrity  Goal: Absence of new skin breakdown  Description: 1. Monitor for areas of redness and/or skin breakdown  2. Assess vascular access sites hourly  3. Every 4-6 hours minimum:  Change oxygen saturation probe site  4. Every 4-6 hours:  If on nasal continuous positive airway pressure, respiratory therapy assess nares and determine need for appliance change or resting period.   Outcome: Completed     Problem: Safety - Adult  Goal: Free from fall injury  Outcome: Completed     Problem: ABCDS Injury Assessment  Goal: Absence of physical injury  Outcome: Completed

## 2023-02-25 NOTE — RT PROTOCOL NOTE
RT Inhaler-Nebulizer Bronchodilator Protocol Note    There is a bronchodilator order in the chart from a provider indicating to follow the RT Bronchodilator Protocol and there is an Initiate RT Inhaler-Nebulizer Bronchodilator Protocol order as well (see protocol at bottom of note). CXR Findings:  XR CHEST PORTABLE    Result Date: 2/23/2023  Stable exam since yesterday given differences in radiographic technique and patient positioning. The findings from the last RT Protocol Assessment were as follows:   History Pulmonary Disease: Chronic pulmonary disease  Respiratory Pattern: Mild dyspnea at rest, irregular pattern, or RR 21-25 bpm  Breath Sounds: Intermittent or unilateral wheezes  Cough: Strong, productive  Indication for Bronchodilator Therapy: Decreased or absent breath sounds  Bronchodilator Assessment Score: 11    Aerosolized bronchodilator medication orders have been revised according to the RT Inhaler-Nebulizer Bronchodilator Protocol below. Respiratory Therapist to perform RT Therapy Protocol Assessment initially then follow the protocol. Repeat RT Therapy Protocol Assessment PRN for score 0-3 or on second treatment, BID, and PRN for scores above 3. No Indications - adjust the frequency to every 6 hours PRN wheezing or bronchospasm, if no treatments needed after 48 hours then discontinue using Per Protocol order mode. If indication present, adjust the RT bronchodilator orders based on the Bronchodilator Assessment Score as indicated below. Use Inhaler orders unless patient has one or more of the following: on home nebulizer, not able to hold breath for 10 seconds, is not alert and oriented, cannot activate and use MDI correctly, or respiratory rate 25 breaths per minute or more, then use the equivalent nebulizer order(s) with same Frequency and PRN reasons based on the score. If a patient is on this medication at home then do not decrease Frequency below that used at home.     0-3 - enter or revise RT bronchodilator order(s) to equivalent RT Bronchodilator order with Frequency of every 4 hours PRN for wheezing or increased work of breathing using Per Protocol order mode. 4-6 - enter or revise RT Bronchodilator order(s) to two equivalent RT bronchodilator orders with one order with BID Frequency and one order with Frequency of every 4 hours PRN wheezing or increased work of breathing using Per Protocol order mode. 7-10 - enter or revise RT Bronchodilator order(s) to two equivalent RT bronchodilator orders with one order with TID Frequency and one order with Frequency of every 4 hours PRN wheezing or increased work of breathing using Per Protocol order mode. 11-13 - enter or revise RT Bronchodilator order(s) to one equivalent RT bronchodilator order with QID Frequency and an Albuterol order with Frequency of every 4 hours PRN wheezing or increased work of breathing using Per Protocol order mode. Greater than 13 - enter or revise RT Bronchodilator order(s) to one equivalent RT bronchodilator order with every 4 hours Frequency and an Albuterol order with Frequency of every 2 hours PRN wheezing or increased work of breathing using Per Protocol order mode. RT to enter RT Home Evaluation for COPD & MDI Assessment order using Per Protocol order mode.     Electronically signed by Sarah Contreras RCP on 2/24/2023 at 8:15 PM

## 2023-02-25 NOTE — DISCHARGE SUMMARY
[facility]  Office: [address], DO, Kenrick Christopher, DO, Baron Cruz, DO, Nilo Christine, DO, Jesus Pastor MD, Yasmany Chaparro MD, Jillian Herrera MD, Ubaldo Alcantar MD,  Lance Villa MD, Jasmin Jaquez MD, Agata Oliver, DO, Toña Vail MD,  Viola Lopez MD, Ruchi Maradiaga MD, Sonja Funes DO, Rosalina Mendez MD, Saulo Barger MD, Kindra Castro DO, Adryan Avery MD, Lencho Calix MD, Tyra Aaron MD, Karl Bullock MD, Mina Bustamante DO, Marleen Velazco MD, Donna Murrell MD, Corina Cole, CNP,  Yelena Tejada, CNP, Severiano Baptist, [facility], Keisha Dawkins, CNP,  Julián Barclay, [facility], Lina Mina, CNP, Adele Vincent, CNP, Glendy Li, CNP, Liv Gastelum, CNP, Valeria Saldana, [facility], Kelly Das PA-C, Colleen Lizarraga, CNS, Justo Rosenberg, CNP, Denzel Love, [facility]    Discharge Summary     Patient ID: Paras Oliva  :  1945   MRN: [id number]     ACCOUNT:  [de-identified]   Patient's PCP: Monica Hernandez MD  Admit Date: 2023   Discharge Date: 2023     Length of Stay: 3  Code Status:  Full Code  Admitting Physician: Jorge Luis Christine DO  Discharge Physician: RYAN Walker NP     Active Discharge Diagnoses:     Hospital Problem Lists:  Principal Problem:    Multifocal pneumonia  Active Problems:    Pulmonary fibrosis [facility])    History of subdural hematoma    Dyspnea, unspecified    GERD (gastroesophageal reflux disease)    Oxygen dependent    Acute on chronic respiratory failure with hypoxia (HCC)    Hypothyroidism    Lactic acidosis    COPD (chronic obstructive pulmonary disease) ([facility]    A-fib ([facility]  Resolved Problems:    * No resolved hospital problems. *      Admission Condition:  fair     Discharged Condition: stable    Hospital Stay:     Hospital Course:   Paras Oliva is a 69 y.o. female who was admitted for the management of  Multifocal pneumonia , presented to ER with short of breath with hypoxia    2/22 - Patient presents to [facility] emergency department with complaints of shortness of breath. Patient states that her increased work of breathing started a few days ago and has progressively worsened. Patient has a significant past medical history of atrial fibrillation, coronary artery disease, COPD, depression, GERD, hypothyroidism, iron deficiency anemia, pulmonary fibrosis, subdural hematoma and is on home O2 at 2 L per nasal cannula. Patient denies any recent fevers, chills, nausea, vomiting and diarrhea. Patient states that she does experience chest discomfort with coughing. She has a productive cough noted. Patient had attempted to take her different breathing treatments at home without improvement. Patient called 911 and was transported to [facility] emergency room. Patient was given 125 mg of IV Solu-Medrol as well as an albuterol treatment in route. Patient was also placed on CPAP with improving oxygenation. 2/23-2/24 -treated with antibiotics and steroids. Weaned off BiPAP. Weaned towards her baseline level of oxygen demand. 2/25 - Patient condition is improved. She is back to her baseline level of respiratory distress. She is requiring only 2 L of oxygen. Patient is advised she should follow-up with her pulmonologist to discuss overnight oxygen demands versus sleep study. Patient expressed understanding. Patient responded very well to steroids and antibiotic therapy. These will be transition to oral and she can be discharged later today.     Significant therapeutic interventions: As above    Significant Diagnostic Studies:   Labs / Micro:  CBC:   Lab Results   Component Value Date/Time    WBC 13.9 02/24/2023 03:37 AM    RBC 4.13 02/24/2023 03:37 AM    HGB 12.5 02/24/2023 03:37 AM    HCT 39.9 02/24/2023 03:37 AM    MCV 96.6 02/24/2023 03:37 AM    MCH 30.3 02/24/2023 03:37 AM    MCHC 31.3 02/24/2023 03:37 AM    RDW 15.7 02/24/2023 03:37 AM     02/24/2023 03:37 AM     BMP:    Lab Results   Component Value Date/Time    GLUCOSE 69 02/24/2023 03:37 AM     02/24/2023 03:37 AM    K 3.9 02/24/2023 03:37 AM     02/24/2023 03:37 AM    CO2 25 02/24/2023 03:37 AM    ANIONGAP 10 02/24/2023 03:37 AM    BUN 11 02/24/2023 03:37 AM    CREATININE 0.59 02/24/2023 03:37 AM    BUNCRER 19 02/24/2023 03:37 AM    CALCIUM 9.3 02/24/2023 03:37 AM    LABGLOM >60 02/24/2023 03:37 AM    GFRAA >60 09/09/2022 07:25 PM    GFR      09/09/2022 07:25 PM    GFR      09/09/2022 07:25 PM        Radiology:  CT CHEST WO CONTRAST    Result Date: 2/23/2023  Findings most compatible with multifocal pneumonia. Consider both typical and atypical etiologies, including viral pneumonia. Some of the areas of the consolidation has a nodular morphology, and therefore this process should be followed to resolution with CT technique to ensure resolution. There is a background of multifocal fibrosis throughout both lungs, most likely from a remote episode of pneumonia. Moderate-sized hiatal hernia. Gastric varices are suggested adjacent to that, etiology unclear. Mild mural thickening of the distal esophagus, which may reflect esophagitis. XR CHEST PORTABLE    Result Date: 2/25/2023  Multifocal opacities in both lungs are again noted. The distribution is stable. XR CHEST PORTABLE    Result Date: 2/23/2023  Stable exam since yesterday given differences in radiographic technique and patient positioning. XR CHEST PORTABLE    Result Date: 2/22/2023  Diffuse pulmonary infiltrates concerning for pneumonia. Consultations:    Consults:     Final Specialist Recommendations/Findings:   IP CONSULT TO PULMONOLOGY      The patient was seen and examined on day of discharge and this discharge summary is in conjunction with any daily progress note from day of discharge.     Discharge plan:     Disposition: Home    Physician Follow Up:   Please follow-up with your primary care provider as well as your pulmonologist.    Requiring Further Evaluation/Follow Up POST HOSPITALIZATION/Incidental Findings: Sleep apnea versus worsening pulmonary fibrosis requiring increased oxygen at nighttime. Diet: regular diet    Activity: As tolerated    Instructions to Patient: Take the medications as prescribed. The medications are listed below that you are to be taking. Please make and keep a follow-up appointment with your primary care provider to have your chronic medical conditions evaluated. Please make and keep a follow-up appointment with your pulmonologist to discuss whether or not you need to have a sleep study or discuss a sleep apnea machine or increased oxygen support during nighttime hours. Discharge Medications:      Medication List        START taking these medications      amoxicillin-clavulanate 1000-62.5 MG per extended release tablet  Commonly known as: Augmentin XR  Take 1 tablet by mouth 2 times daily for 10 days     azithromycin 500 MG tablet  Commonly known as: ZITHROMAX  Take 1 tablet by mouth daily for 3 days     guaiFENesin 600 MG extended release tablet  Commonly known as: MUCINEX  Take 1 tablet by mouth 2 times daily     guaiFENesin-dextromethorphan 100-10 MG/5ML syrup  Commonly known as: ROBITUSSIN DM  Take 5 mLs by mouth every 4 hours as needed for Cough     montelukast 10 MG tablet  Commonly known as: SINGULAIR  Take 1 tablet by mouth nightly            CHANGE how you take these medications      * predniSONE 10 MG tablet  Commonly known as: DELTASONE  Tapered dose 40mg x 3 days. .. 30mg x 3 days. .. 20mg x 3 days . Chante Luster .10mg x3days . ..then off  What changed: Another medication with the same name was added. Make sure you understand how and when to take each.      * predniSONE 10 MG tablet  Commonly known as: DELTASONE  Take 20 mg twice a day for 3 days, take 20 mg in the morning and 10 mg at night for 3 days, take 10 mg twice a day for 3 days, take 10 mg only in the morning for 3 days, then stop  What changed: You were already taking a medication with the same name, and this prescription was added. Make sure you understand how and when to take each. * This list has 2 medication(s) that are the same as other medications prescribed for you. Read the directions carefully, and ask your doctor or other care provider to review them with you. CONTINUE taking these medications      albuterol sulfate  (90 Base) MCG/ACT inhaler  Commonly known as: PROVENTIL;VENTOLIN;PROAIR  Inhale 2 puffs into the lungs 4 times daily     alendronate 70 MG tablet  Commonly known as: FOSAMAX  Take 1 tablet by mouth every 7 days On Sundays     Anoro Ellipta 62.5-25 MCG/ACT inhaler  Generic drug: umeclidinium-vilanterol  Inhale 1 puff into the lungs daily     ASPIRIN 81 PO     bumetanide 2 MG tablet  Commonly known as: BUMEX  Take 1 tablet by mouth daily     calcium citrate-vitamin D 315-250 MG-UNIT Tabs per tablet  Commonly known as: CITRICAL + D     HYDROcodone-acetaminophen  MG per tablet  Commonly known as: NORCO     hydroxychloroquine 200 MG tablet  Commonly known as: PLAQUENIL     ipratropium-albuterol 0.5-2.5 (3) MG/3ML Soln nebulizer solution  Commonly known as: DUONEB     levothyroxine 200 MCG tablet  Commonly known as: SYNTHROID  Take 1 tablet by mouth Daily     NONFORMULARY     pantoprazole sodium 40 MG Pack packet  Commonly known as: PROTONIX  Take 1 packet by mouth in the morning and 1 packet in the evening. Take before meals.      potassium chloride 20 MEQ packet  Commonly known as: KLOR-CON     pregabalin 300 MG capsule  Commonly known as: LYRICA     rivaroxaban 20 MG Tabs tablet  Commonly known as: Xarelto  Take 1 tablet by mouth daily (with breakfast)     traZODone 100 MG tablet  Commonly known as: [address] [place] [address] your doctor about these medications      ondansetron 4 MG disintegrating tablet  Commonly known as: ZOFRAN-ODT Where to Get Your Medications        These medications were sent to [address] [place] [address], [facility] [address]      Phone: [phone]   amoxicillin-clavulanate 1000-62.5 MG per extended release tablet  azithromycin 500 MG tablet  guaiFENesin 600 MG extended release tablet  guaiFENesin-dextromethorphan 100-10 MG/5ML syrup  montelukast 10 MG tablet  predniSONE 10 MG tablet         No discharge procedures on file. Time Spent on discharge is  36 mins in patient examination, evaluation, counseling as well as medication reconciliation, prescriptions for required medications, discharge plan and follow up. Electronically signed by   RYAN Hernandez NP  2/25/2023  1:37 PM      Thank you Dr. Mata Flowers MD for the opportunity to be involved in this patient's care.

## 2023-02-25 NOTE — RT PROTOCOL NOTE
RT Inhaler-Nebulizer Bronchodilator Protocol Note    There is a bronchodilator order in the chart from a provider indicating to follow the RT Bronchodilator Protocol and there is an Initiate RT Inhaler-Nebulizer Bronchodilator Protocol order as well (see protocol at bottom of note). CXR Findings:  XR CHEST PORTABLE    Result Date: 2/25/2023  Multifocal opacities in both lungs are again noted. The distribution is stable. The findings from the last RT Protocol Assessment were as follows:   History Pulmonary Disease: Chronic pulmonary disease  Respiratory Pattern: Dyspnea on exertion or RR 21-25 bpm  Breath Sounds: Intermittent or unilateral wheezes  Cough: Strong, productive  Indication for Bronchodilator Therapy: Decreased or absent breath sounds  Bronchodilator Assessment Score: 9    Aerosolized bronchodilator medication orders have been revised according to the RT Inhaler-Nebulizer Bronchodilator Protocol below. Respiratory Therapist to perform RT Therapy Protocol Assessment initially then follow the protocol. Repeat RT Therapy Protocol Assessment PRN for score 0-3 or on second treatment, BID, and PRN for scores above 3. No Indications - adjust the frequency to every 6 hours PRN wheezing or bronchospasm, if no treatments needed after 48 hours then discontinue using Per Protocol order mode. If indication present, adjust the RT bronchodilator orders based on the Bronchodilator Assessment Score as indicated below. Use Inhaler orders unless patient has one or more of the following: on home nebulizer, not able to hold breath for 10 seconds, is not alert and oriented, cannot activate and use MDI correctly, or respiratory rate 25 breaths per minute or more, then use the equivalent nebulizer order(s) with same Frequency and PRN reasons based on the score. If a patient is on this medication at home then do not decrease Frequency below that used at home.     0-3 - enter or revise RT bronchodilator order(s) to equivalent RT Bronchodilator order with Frequency of every 4 hours PRN for wheezing or increased work of breathing using Per Protocol order mode. 4-6 - enter or revise RT Bronchodilator order(s) to two equivalent RT bronchodilator orders with one order with BID Frequency and one order with Frequency of every 4 hours PRN wheezing or increased work of breathing using Per Protocol order mode. 7-10 - enter or revise RT Bronchodilator order(s) to two equivalent RT bronchodilator orders with one order with TID Frequency and one order with Frequency of every 4 hours PRN wheezing or increased work of breathing using Per Protocol order mode. 11-13 - enter or revise RT Bronchodilator order(s) to one equivalent RT bronchodilator order with QID Frequency and an Albuterol order with Frequency of every 4 hours PRN wheezing or increased work of breathing using Per Protocol order mode. Greater than 13 - enter or revise RT Bronchodilator order(s) to one equivalent RT bronchodilator order with every 4 hours Frequency and an Albuterol order with Frequency of every 2 hours PRN wheezing or increased work of breathing using Per Protocol order mode. RT to enter RT Home Evaluation for COPD & MDI Assessment order using Per Protocol order mode.     Electronically signed by Brenda Diez RCP on 2/25/2023 at 9:50 AM

## 2023-02-25 NOTE — FLOWSHEET NOTE
02/25/23 1400   Vitals   Heart Rate 92   Resp 18   /66   MAP (Calculated) 82   MAP (mmHg) 81   Oxygen Therapy   SpO2 94 %     Discharge education provided, follow up visits already scheduled, new medications at pharmacy in [address] pt educated on antibiotics, cough medications, and expectorants. Pt  and son at bedside for education. Pt states understands, all questions answered, pt wheeled out in wheelchair with  at bedside.

## 2023-02-25 NOTE — PLAN OF CARE
Problem: Respiratory - Adult  Goal: Achieves optimal ventilation and oxygenation  2/25/2023 0825 by Ivis Ash RCP  Outcome: Progressing  2/24/2023 2015 by Jamarcus Blankenship RCP  Outcome: Progressing  Goal: Able to breathe comfortably  2/25/2023 0825 by Ivis Ash RCP  Outcome: Progressing  2/24/2023 2015 by Jamarcus Blankenship RCP  Outcome: Progressing

## 2023-02-25 NOTE — PLAN OF CARE
Problem: Respiratory - Adult  Goal: Achieves optimal ventilation and oxygenation  2/24/2023 2015 by Stella Henry RCP  Outcome: Progressing     Problem: Respiratory - Adult  Goal: Able to breathe comfortably  2/24/2023 2015 by Stella Henry RCP  Outcome: Progressing

## 2023-02-25 NOTE — PROGRESS NOTES
[facility]  Office: [address], DO, Moniqueabdifatah Shrestha, DO, Rickygabby Chencho, DO, Diamond Christine, DO, Marisa Sullivan MD, Lona Calix MD, Brianda Gracia MD, Clair Marquis MD,  Ana Kirk MD, Maria C Lincoln MD, Latasha Avery, DO, Jeevan Cruz MD,  Larry Preciado MD, Terrance Solitario MD, Catherine Shaw, DO, Iwona Whitney MD, Kenny Gay MD, Rich Marino, DO, Kaitlynn Rivas MD, Juan Short MD, Piper Anderson MD, Carley Boyle MD, Mayelin Mckeon DO, Mary Vergara MD, Layla Talbot MD, Mili Mariee, Fariba Marin, CNP, Kendrick Dougherty, CNP, Gala Gill, CNP,  Hira Tapia, JESUS, Tiera Rosales, CNP, Chrissy Harrington, CNP, Tawana Gomez, CNP, Christa Gomez, CNP, Bossman Mcpherson, CNP, Pierre Kelley PAJuanitaC, Paulo Nunez, CNS, Pito Melton, CNP, Servando Williamson, Pedraza [facility]    Progress Note    2/25/2023    1:31 PM    Name:   Tawny Garcia  MRN:     [id number]     Acct:      [de-identified]   Room:   [address] [place] [address] Day:  3  Admit Date:  2/22/2023  6:40 PM    PCP:   Merced Rios MD  Code Status:  Full Code    Subjective:     C/C: Cough with shortness of breath  Interval History Status: improved. Patient condition is improved. She is back to her baseline level of respiratory distress. She is requiring only 2 L of oxygen. Patient is advised she should follow-up with her pulmonologist to discuss overnight oxygen demands versus sleep study. Patient expressed understanding. Patient responded very well to steroids and antibiotic therapy. These will be transition to oral and she can be discharged later today. Brief History:     2/22 - Patient presents to [facility] emergency department with complaints of shortness of breath. Patient states that her increased work of breathing started a few days ago and has progressively worsened.   Patient has a significant past medical history of atrial fibrillation, coronary artery disease, COPD, depression, GERD, hypothyroidism, iron deficiency anemia, pulmonary fibrosis, subdural hematoma and is on home O2 at 2 L per nasal cannula. Patient denies any recent fevers, chills, nausea, vomiting and diarrhea. Patient states that she does experience chest discomfort with coughing. She has a productive cough noted. Patient had attempted to take her different breathing treatments at home without improvement. Patient called 911 and was transported to [facility] emergency room. Patient was given 125 mg of IV Solu-Medrol as well as an albuterol treatment in route. Patient was also placed on CPAP with improving oxygenation. 2/23-2/24 -treated with antibiotics and steroids. Weaned off BiPAP. Weaned towards her baseline level of oxygen demand. 2/25 - Patient condition is improved. She is back to her baseline level of respiratory distress. She is requiring only 2 L of oxygen. Patient is advised she should follow-up with her pulmonologist to discuss overnight oxygen demands versus sleep study. Patient expressed understanding. Patient responded very well to steroids and antibiotic therapy. These will be transition to oral and she can be discharged later today. Review of Systems:     Constitutional:  negative for chills, fevers, sweats  Respiratory:  negative for cough, dyspnea on exertion, shortness of breath, wheezing  Cardiovascular:  negative for chest pain, chest pressure/discomfort, lower extremity edema, palpitations  Gastrointestinal:  negative for abdominal pain, constipation, diarrhea, nausea, vomiting  Neurological:  negative for dizziness, headache    Medications:      Allergies:  No Known Allergies    Current Meds:   Scheduled Meds:    ipratropium-albuterol  1 ampule Inhalation TID    budesonide-formoterol  2 puff Inhalation BID    montelukast  10 mg Oral Nightly    hydroxychloroquine  300 mg Oral Daily    potassium chloride  20 mEq Oral BID    aspirin  81 mg Oral Daily    calcium carb-cholecalciferol  1 tablet Oral BID WC    levothyroxine  200 mcg Oral Daily    pantoprazole  40 mg Oral BID AC    pregabalin  300 mg Oral BID    rivaroxaban  20 mg Oral Daily with breakfast    sodium chloride flush  5-40 mL IntraVENous 2 times per day    cefTRIAXone (ROCEPHIN) IV  1,000 mg IntraVENous Q24H    guaiFENesin  600 mg Oral BID    traZODone  200 mg Oral Nightly     Continuous Infusions:    sodium chloride       PRN Meds: guaiFENesin-dextromethorphan, albuterol, HYDROcodone-acetaminophen, sodium chloride flush, sodium chloride, ondansetron **OR** ondansetron, magnesium hydroxide, acetaminophen **OR** acetaminophen    Data:     Past Medical History:   has a past medical history of A-fib ([facility] [address] Biventricular congestive heart failure ([facility] [address] Bronchiectasis with acute exacerbation ([facility] [address] CAD (coronary artery disease), Chronic pain syndrome, COPD (chronic obstructive pulmonary disease) ([facility] [address] Depression, GERD (gastroesophageal reflux disease), Hypothyroidism, Impaired fasting glucose, Iron deficiency anemia, Osteoporosis, Pulmonary fibrosis ([facility] [address] and Subdural hematoma. Social History:   reports that she quit smoking about 46 years ago. Her smoking use included cigarettes. She has a 10.00 pack-year smoking history. She has never used smokeless tobacco. She reports that she does not drink alcohol and does not use drugs. Family History:   Family History   Problem Relation Age of Onset    Heart Attack Father 61    Heart Attack Sister     Heart Disease Brother        Vitals:  BP 96/63   Pulse 84   Temp 97.3 °F (36.3 °C) (Temporal)   Resp 11   Ht 5' 5\" (1.651 m)   Wt 156 lb 15.5 oz (71.2 kg)   SpO2 95%   BMI 26.12 kg/m²   Temp (24hrs), Av.7 °F (36.5 °C), Min:96.9 °F (36.1 °C), Max:98.2 °F (36.8 °C)    No results for input(s): POCGLU in the last 72 hours. I/O (24Hr):     Intake/Output Summary (Last 24 hours) at 2023 1331  Last data filed at 2/24/2023 1844  Gross per 24 hour   Intake --   Output 700 ml   Net -700 ml       Labs:  Hematology:  Recent Labs     02/22/23  1947 02/23/23  0546 02/24/23  0337   WBC  --  18.0* 13.9*   RBC  --  4.09 4.13   HGB  --  12.2 12.5   HCT  --  39.0 39.9   MCV  --  95.4 96.6   MCH  --  29.8 30.3   MCHC  --  31.3 31.3   RDW  --  15.6* 15.7*   PLT  --  159 167   MPV  --  9.2 10.5   CRP 76.2*  --   --      Chemistry:  Recent Labs     02/23/23 0546 02/24/23 0337    141   K 4.0 3.9   * 106   CO2 25 25   GLUCOSE 94 69*   BUN 14 11   CREATININE 0.49* 0.59   ANIONGAP 6* 10   LABGLOM >60 >60   CALCIUM 8.4* 9.3   TROPHS 14  --    No results for input(s): PROT, LABALBU, LABA1C, L1ZMYSA, M3XQZRD, FT4, TSH, AST, ALT, LDH, GGT, ALKPHOS, LABGGT, BILITOT, BILIDIR, AMMONIA, AMYLASE, LIPASE, LACTATE, CHOL, HDL, LDLCHOLESTEROL, CHOLHDLRATIO, TRIG, VLDL, IWX09FK, PHENYTOIN, PHENYF, URICACID, POCGLU in the last 72 hours. ABG:  Lab Results   Component Value Date/Time    POCPH 7.424 08/25/2022 05:15 AM    PHART 7.400 10/14/2022 07:55 AM    POCPCO2 35.2 08/25/2022 05:15 AM    OJM1SZC 51.4 10/14/2022 07:55 AM    POCPO2 75.9 08/25/2022 05:15 AM    PO2ART 99.0 10/14/2022 07:55 AM    POCHCO3 23.0 08/25/2022 05:15 AM    KYD0RUN 31.1 10/14/2022 07:55 AM    NBEA 1 08/25/2022 05:15 AM    PBEA 5.0 10/14/2022 07:55 AM    GSW0ZQJ 35 11/02/2020 04:16 AM    VCRQ5QNE 96 08/25/2022 05:15 AM    H0RWXXFT 97.4 10/14/2022 07:55 AM    FIO2 28 10/10/2022 05:30 AM     Lab Results   Component Value Date/Time    SPECIAL 5ML RT HAND 02/22/2023 09:40 AM     Lab Results   Component Value Date/Time    CULTURE NORMAL RESPIRATORY STEVE MODERATE GROWTH 02/23/2023 12:22 PM       Radiology:  CT CHEST WO CONTRAST    Result Date: 2/23/2023  Findings most compatible with multifocal pneumonia. Consider both typical and atypical etiologies, including viral pneumonia.  Some of the areas of the consolidation has a nodular morphology, and therefore this process should be followed to resolution with CT technique to ensure resolution. There is a background of multifocal fibrosis throughout both lungs, most likely from a remote episode of pneumonia. Moderate-sized hiatal hernia. Gastric varices are suggested adjacent to that, etiology unclear. Mild mural thickening of the distal esophagus, which may reflect esophagitis. XR CHEST PORTABLE    Result Date: 2/25/2023  Multifocal opacities in both lungs are again noted. The distribution is stable. XR CHEST PORTABLE    Result Date: 2/23/2023  Stable exam since yesterday given differences in radiographic technique and patient positioning. XR CHEST PORTABLE    Result Date: 2/22/2023  Diffuse pulmonary infiltrates concerning for pneumonia. Physical Examination:        General appearance:  alert, cooperative and no distress  Mental Status:  oriented to person, place and time and normal affect  Lungs: Scattered rhonchi and wheezes. Decreased air movement at the bases. Heart:  regular rate and rhythm, no murmur  Abdomen:  soft, nontender, nondistended, normal bowel sounds, no masses, hepatomegaly, splenomegaly  Extremities:  no edema, redness, tenderness in the calves  Skin:  no gross lesions, rashes, induration.   There is pallor    Assessment:        Hospital Problems             Last Modified POA    * (Principal) Multifocal pneumonia 2/22/2023 Yes    Pulmonary fibrosis (HCC) (Chronic) 2/22/2023 Yes    History of subdural hematoma 2/22/2023 Yes    Dyspnea, unspecified 2/22/2023 Yes    GERD (gastroesophageal reflux disease) 2/22/2023 Yes    Overview Signed 2/22/2023  1:20 PM by RYAN De Los Santos CNP     Formatting of this note might be different from the original.  POA: Yes  - this is POA and being treated with home medications         Oxygen dependent 2/22/2023 Yes    Acute on chronic respiratory failure with hypoxia ([facility] [address] 2/22/2023 Yes    Hypothyroidism (Chronic) 2/22/2023 Yes    Lactic acidosis 2/22/2023 Yes    COPD (chronic obstructive pulmonary disease) (HCC) (Chronic) 2/22/2023 Yes    A-fib ([facility] [address] 2/22/2023 Yes       Plan:        Multifocal community-acquired pneumonia with a known history of pulmonary fibrosis and COPD resulting in acute on chronic respiratory failure with hypoxia  Transition to oral antibiotics  Continue maintenance inhalers  Add Singulair  Patient instructed to restart prednisone taper  Follow as an outpatient with pulmonology  A-fib on chronic anticoagulation  Continue home medication regiment for rate control and Xarelto for anticoagulation  Hypothyroidism  Continue Synthroid    RYAN Hernandez - NP  2/25/2023  1:31 PM

## 2023-02-26 LAB
MICROORGANISM SPEC CULT: NORMAL
MICROORGANISM SPEC CULT: NORMAL
SERVICE CMNT-IMP: NORMAL
SERVICE CMNT-IMP: NORMAL
SPECIMEN DESCRIPTION: NORMAL
SPECIMEN DESCRIPTION: NORMAL

## 2023-02-27 ENCOUNTER — CARE COORDINATION (OUTPATIENT)
Dept: CASE MANAGEMENT | Age: 78
End: 2023-02-27

## 2023-02-27 DIAGNOSIS — J18.9 MULTIFOCAL PNEUMONIA: Primary | ICD-10-CM

## 2023-02-27 PROCEDURE — 1111F DSCHRG MED/CURRENT MED MERGE: CPT | Performed by: INTERNAL MEDICINE

## 2023-02-27 NOTE — CARE COORDINATION
[address] Care Transitions Initial Follow Up Call    Call within 2 business days of discharge: Yes    Patient Current Location: [place] [address] Coordinator contacted the patient by telephone to perform post hospital discharge assessment. Verified name and  with patient as identifiers. Provided introduction to self, and explanation of the LPN Care Coordinator role. Patient: David Morgan Patient : 1945   MRN: [id number]  Reason for Admission: PNEUMONIA  ACUTE RESP DISTRESS  Discharge Date: 23 RARS: Readmission Risk Score: 16.8      Last Discharge [address]       Date Complaint Diagnosis Description Type Department Provider    23   Admission (Discharged) [facility] ICU Mitzi Ort P Blood, DO    23 Shortness of Breath Acute respiratory distress . .. ED (TRANSFER) [facility] AT THE [facility] Judy Nelson MD            Was this an external facility discharge? No Discharge Facility: [address] [place] [address]    Challenges to be reviewed by the provider   Additional needs identified to be addressed with provider: No  none               Method of communication with provider: none. Transitions of Care Initial Call: spoke to Hector Benítez. Explained the role of Care Transition Nurse and the Transition program, patient is agreeable to follow up calls Post discharge from the [address] states she is feeling better. She went to the [facility] this morning for a scheduled visit. Visit was for pain management. She had to get her implanted pain pump re filled. Dyspnea is improving. Productive cough with yellow sputum . Wears oxygen continuously at 2lpm. Pt states she uses her nebulizer 4 times a day and wears a percussion vest to help loosen secretions. Appetite is good. Bowel and bladder WNL. 1111F medication reconciliation completed. Hector Benítez has all new medications and is taking all medications as prescribed. Educated on taking  Augmentin and Zithromax until gone. Verbalized understanding.  Pt denies need for Terell Maddox or DME at this time. She states she has an appt with her PCP this week on Thursday. Will continue to follow. Patient is aware of when to contact MD with any new or worsening symptoms. Advised to contact PCP 24/7 with any health concerns for early outpatient intervention in an effort to avoid hospitalization. Report any worsening symptoms to PCP and/or Call 911 and/or GO TO EMERGENCY ROOM if symptoms are severe. Expresses understanding. [facility] Care Coordinator reviewed discharge instructions with patient who verbalized understanding. The patient was given an opportunity to ask questions and does not have any further questions or concerns at this time. Were discharge instructions available to patient? Yes. Reviewed appropriate site of care based on symptoms and resources available to patient including: PCP. The patient agrees to contact the PCP office for questions related to their healthcare. Advance Care Planning:   Does patient have an Advance Directive: reviewed and current. Medication reconciliation was performed with patient, who verbalizes understanding of administration of home medications. Medications reviewed, 1111F entered: yes    Was patient discharged with a pulse oximeter? no    Non-face-to-face services provided:  Obtained and reviewed discharge summary and/or continuity of care documents    Offered patient enrollment in the Remote Patient Monitoring (RPM) program for in-home monitoring: Patient is not eligible for RPM program. AFFILIATE PCP      Discussed follow-up appointments. If no appointment was previously scheduled, appointment scheduling offered: Yes. Is follow up appointment scheduled within 7 days of discharge? Yes. Follow Up  Future Appointments   Date Time Provider Marietta Leon   7/6/2023 10:00 AM MD Trace Rankin C.D.   8/7/2023  9:30 AM [facility]   2/13/2024 10:00 AM Severo Bourgeois, MD [facility]       [facility] Coordinator provided contact information. Plan for follow-up call in 5-7 days based on severity of symptoms and risk factors. Plan for next call: symptom management-DYSPNEA  COUGH    self management-monitor pulse ox. Wear oxygen   medication management-take all medications as directed.  Complete augmenting and zithromax    Galvin Sergeant, LPN Melvina Harada LPN Care Transitions Nurse   [phone]

## 2023-03-03 ENCOUNTER — CARE COORDINATION (OUTPATIENT)
Dept: CASE MANAGEMENT | Age: 78
End: 2023-03-03

## 2023-03-03 NOTE — CARE COORDINATION
[facility] Care Transitions Follow Up Call    Patient Current Location:  Home: [address] Care Coordinator contacted the patient by telephone to follow up after admission on 23. Verified name and  with patient as identifiers. Patient: Anat Casarez  Patient : 1945   MRN: [id number]  Reason for Admission: Pneumonia  Discharge Date: 23 RARS: Readmission Risk Score: 16.8      Needs to be reviewed by the provider   Additional needs identified to be addressed with provider: No  none             Method of communication with provider: none. Subsequent transitional call. Spoke to : Soco Brain Pt states she is doing well. No fever or chills. Productive cough. She has dyspnea with exertion. Wears oxygen 2lpm continuously. Uses nebulizer every 4 hours as needed. Taking all medications as directed. Educated on completing prednisone and Augmentin. Verbalized understanding. Seen by PCP yesterday. Sleep study scheduled 23. No HHC or DME needs. Will continue to follow. Patient is aware of when to contact MD with any new or worsening symptoms. Advised to contact PCP  with any health concerns for early outpatient intervention in an effort to avoid hospitalization. Report any worsening symptoms to PCP and/or Call 911 and/or GO TO EMERGENCY ROOM if symptoms are severe. Expresses understanding. Addressed changes since last contact:  none  Discussed follow-up appointments. If no appointment was previously scheduled, appointment scheduling offered: Yes. Is follow up appointment scheduled within 7 days of discharge? Yes. Follow Up  Future Appointments   Date Time Provider Marietta Leon   2023  8:00 PM [facility] SLEEP RM 1 [facility] SLEEP Lizzie GARZA   2023 10:00 AM MD Ele Rand C.D.   2023  9:30 AM MD LASHONDA MarinaF PULM [facility]   2024 10:00 AM Diamond Enrique MD TIFF CARD TPP     Non-[facility] follow up appointment(s): n/a    LPN Care Coordinator reviewed discharge instructions with patient and discussed any barriers to care and/or understanding of plan of care after discharge. Discussed appropriate site of care based on symptoms and resources available to patient including: PCP  When to call 911. The patient agrees to contact the PCP office for questions related to their healthcare. Advance Care Planning:   reviewed and current. Patients top risk factors for readmission: medical condition-pneumonia  Interventions to address risk factors: Obtained and reviewed discharge summary and/or continuity of care documents    Offered patient enrollment in the Remote Patient Monitoring (RPM) program for in-home monitoring: Patient is not eligible for RPM program. Affiliate PCP     Care Transitions Subsequent and Final Call    Subsequent and Final Calls  Do you have any ongoing symptoms?: No  Have your medications changed?: No  Do you have any questions related to your medications?: No  Do you currently have any active services?: No  Are you currently active with any services?: Home Health  Do you have any needs or concerns that I can assist you with?: No  Identified Barriers: None  Care Transitions Interventions  No Identified Needs  Other Interventions:             LPN Care Coordinator provided contact information for future needs. Plan for follow-up call in 5-7 days based on severity of symptoms and risk factors. Plan for next call: symptom [address] DYSPNEA  medication management-complete antibiotics and prednisone.  Take all meds as prescribed    ANDRES Feliz LPN Care Transitions Nurse   [phone]

## 2023-03-09 ENCOUNTER — CARE COORDINATION (OUTPATIENT)
Dept: CASE MANAGEMENT | Age: 78
End: 2023-03-09

## 2023-03-09 NOTE — CARE COORDINATION
[address] Care Transitions Follow Up Call    Patient Current Location: [address]  Transition Nurse contacted the family by telephone to follow up after admission on 23. Verified name and  with family as identifiers. Patient: Lance Becerra  Patient : 1945   MRN: [id number]  Reason for Admission: Acute respiratory distress  Discharge Date: 23 RARS: Readmission Risk Score: 16.8      Needs to be reviewed by the provider   Additional needs identified to be addressed with provider: No  none             Method of communication with provider: none. Spoke to pt's spouse Steve Opitz, stated pt is not currently with him. No other contact numbers. Stated pt is doing OK, remains on supplemental 02 at 2 lpm, no increased SOB, wearing 02 continuously at home. No increased cough. Using nebulizer and chest vest. Has sleep study for CPAP mask scheduled for 23. Pt has increase in Zoloft from 50 MG to 100 MG daily, tolerating well. Addressed changes since last contact:  medications-using nebs, chest vest, increased Zoloft. Discussed follow-up appointments. Follow Up  Future Appointments   Date Time Provider Marietta Leon   2023  8:00 PM [facility] SLEEP RM 1 [facility] SLEEP [place] HOD   2023 10:00 AM Andrez Monroy MD Kellee Screen TITI [place]   2023  9:30 AM Donta Singletary MD TIFF PULM TPP   2024 10:00 AM [facility] Carrel[facility]       Care Transition Nurse reviewed discharge instructions with family and discussed any barriers to care and/or understanding of plan of care after discharge. Discussed appropriate site of care based on symptoms and resources available to patient including: PCP  Specialist  When to call [address] The family agrees to contact the PCP office for questions related to their healthcare.        Patients top risk factors for readmission: medical condition-COPD, CHF  Interventions to address risk factors: Obtained and reviewed discharge summary and/or continuity of care documents    Offered patient enrollment in the Remote Patient Monitoring (RPM) program for in-home monitoring: Patient is not eligible for RPM program. Affiliate PCP     Care Transitions Subsequent and Final Call    Subsequent and Final Calls  Do you have any ongoing symptoms?: No  Have your medications changed?: No  Do you have any questions related to your medications?: No  Do you currently have any active services?: No  Are you currently active with any services?: Home Health  Do you have any needs or concerns that I can assist you with?: No  Identified Barriers: None  Care Transitions Interventions  Other Interventions:             Care Transition Nurse provided contact information for future needs. Plan for follow-up call in 5-7 days based on severity of symptoms and risk factors. Plan for next call: symptom management-02-SOB? Zoloft increased-tolerating well?  Final call    Carley Cohen RN

## 2023-03-13 ENCOUNTER — CARE COORDINATION (OUTPATIENT)
Dept: CASE MANAGEMENT | Age: 78
End: 2023-03-13

## 2023-03-13 NOTE — CARE COORDINATION
Care Transitions Outreach Attempt      Attempted to reach patient for transitions of care follow up. Unable to reach patient. No answer on home number, left VM on mobile number. Writer spoke to pt's spouse on 3/9/23, no concerns at that time. CTN sign off for transitions if no return call reeived. Patient: Melony Lopez Patient : 1945 MRN: [id number]    Last Discharge [address]       Date Complaint Diagnosis Description Type Department Provider    23   Admission (Discharged) [facility] P Blood, DO    23 Shortness of Breath Acute respiratory distress . .. ED (TRANSFER) Jovita Cure KOLBY Bernal MD            Noted following upcoming appointments from discharge chart review:   [facility] follow up appointment(s):   Future Appointments   Date Time Provider Marietta Leon   2023  8:00 PM M[facility] SLEEP RM 1 MTHZ SLEEP [place] HOD   2023 10:00 AM MD Librado Boo C.D., Do   2023  9:30 AM Malik Macedo MD TIFF PULM TPP   2024 10:00 AM Franik Lay MD TIFF CARD TPP     Elizabeth Hansen, RN BSN   Care Transitions Nurse  [phone]

## 2023-05-09 RX ORDER — ALBUTEROL SULFATE 90 UG/1
AEROSOL, METERED RESPIRATORY (INHALATION)
Qty: 72 G | Refills: 3 | Status: SHIPPED | OUTPATIENT
Start: 2023-05-09

## 2023-05-25 NOTE — CONSULTS
the patient's hiatal hernia. Patient was noted to have Zenker's diverticulum on previous swallow studies. Patient denies fevers, chills nausea and vomiting. Past Medical History   has a past medical history of A-fib ([address] Biventricular congestive heart failure ([address] Bronchiectasis with acute exacerbation ([address] CAD (coronary artery disease), Chronic pain syndrome, COPD (chronic obstructive pulmonary disease) ([address] Depression, GERD (gastroesophageal reflux disease), Hypothyroidism, Impaired fasting glucose, Iron deficiency anemia, Osteoporosis, Pulmonary fibrosis ([address] and Subdural hematoma ([address] Past Surgical History   has a past surgical history that includes Hysterectomy (08/20/1991); Carpal tunnel release (Right, 12/17/1999); Total knee arthroplasty (Right, 03/19/2021); fracture surgery (Left, 12/20/2018); Wrist fracture surgery (Left, 12/20/2018); lumbar discectomy (08/30/1993); Lumbar disc surgery (02/15/1994); back surgery (09/09/1998); back surgery (08/04/1999); Carpal tunnel release (Left, 11/24/1999); Neck surgery (05/03/2002); Carpal tunnel release (Right, 12/30/2002); Carpal tunnel release (Left, 12/17/2003); back surgery (10/23/2003); back surgery (10/23/2003); Cervical disc surgery (10/25/2004); Cervical disc surgery (12/22/2004); Neck surgery (12/09/2005); Toe amputation (10/08/2006); Toe amputation (03/07/2008); lumbar laminectomy (06/09/2008); Toe amputation (10/03/2008); Humerus fracture surgery (Right, 10/03/2010); Knee arthroscopy (Right, 06/02/2011); back surgery (09/11/2017); knee surgery (Right, 02/04/2016); Wrist fracture surgery (Left, 12/20/2018); Wrist surgery (Left, 07/02/2019); and craniotomy (Left, 8/23/2022). Medications  Prior to Admission medications    Medication Sig Start Date End Date Taking?  Authorizing Provider   amLODIPine (NORVASC) 10 MG tablet Take 1 tablet by mouth daily 8/31/22   RYAN Galaviz - CNP   benzocaine-menthol (CEPACOL SORE THROAT) 15-3.6 MG lozenge Take 1 lozenge by mouth every 2 hours as needed for Sore Throat 8/30/22   RYAN Sorensen CNP   enoxaparin Sodium (LOVENOX) 30 MG/0.3ML injection Inject 0.3 mLs into the skin 2 times daily for 14 days 8/30/22 9/13/22  RYAN Sorensen CNP   levETIRAcetam (KEPPRA) 100 MG/ML solution Take 10 mLs by mouth 2 times daily 8/30/22   RYAN Sorensen CNP   tiZANidine (ZANAFLEX) 2 MG tablet Take 1 tablet by mouth every 8 hours as needed (pain) 8/30/22 9/6/22  RYAN Sorensen CNP   ibuprofen (ADVIL;MOTRIN) 400 MG tablet Take 1 tablet by mouth every 6 hours as needed for Pain 8/30/22   RYAN Sorensen CNP   DULoxetine (CYMBALTA) 60 MG extended release capsule Take 60 mg by mouth daily    Historical Provider, MD   traZODone (DESYREL) 100 MG tablet Take 200 mg by mouth nightly    Historical Provider, MD   hydroxychloroquine (PLAQUENIL) 200 MG tablet Take 300 mg by mouth daily    Historical Provider, MD   calcium citrate-vitamin D (CITRICAL + D) 315-250 MG-UNIT TABS per tablet Take 1 tablet by mouth 2 times daily (with meals)    Historical Provider, MD   potassium chloride (KLOR-CON) 20 MEQ packet Take 20 mEq by mouth 2 times daily    Historical Provider, MD   alendronate (FOSAMAX) 70 MG tablet Take 1 tablet by mouth every 7 days On Sundays 8/15/22 11/13/22  Carrie Matamoros MD   pantoprazole sodium (PROTONIX) 40 MG PACK packet Take 1 packet by mouth in the morning and 1 packet in the evening. Take before meals.  8/15/22   Carrie Matamoros MD   sertraline (ZOLOFT) 50 MG tablet Take 1 tablet by mouth daily 6/27/22   Carrie Matamoros MD   levothyroxine (SYNTHROID) 200 MCG tablet Take 1 tablet by mouth Daily 6/27/22   Carrie Matamoros MD   XARELTO 20 MG TABS tablet TAKE 1 TABLET BY MOUTH  DAILY WITH BREAKFAST 4/8/22 8/30/22  Rei Mann MD   ipratropium-albuterol (DUONEB) 0.5-2.5 (3) MG/3ML SOLN nebulizer solution 3 mLs  Patient not taking: Reported on 8/30/2022 11/24/21 Historical Provider, MD   umeclidinium-vilanterol [facility] ELLIPTA) 62.5-25 MCG/INH AEPB inhaler Inhale 1 puff into the lungs daily 3/16/22   Fish Mendez, APRN - CNP   albuterol sulfate  (90 Base) MCG/ACT inhaler Inhale 2 puffs into the lungs 4 times daily 3/16/22   Fish Mendez, APRN - CNP   furosemide (LASIX) 20 MG tablet Take 1 tablet by mouth daily  Patient taking differently: Take 20 mg by mouth daily prn 5/14/21   Prashant Taylor MD   pregabalin (LYRICA) 300 MG capsule Take 1 capsule by mouth 2 times daily for 30 days. 11/6/20 8/22/22  Meenu Burton MD   aspirin 81 MG tablet Take 81 mg by mouth daily  8/29/22  Historical Provider, MD    Scheduled Meds:   acetaminophen  1,000 mg Oral Once     Continuous Infusions:  PRN Meds:. Allergies  has No Known Allergies. Family History  family history includes Heart Attack in her sister; Heart Attack (age of onset: 61) in her father; Heart Disease in her brother. Social History   reports that she quit smoking about 45 years ago. Her smoking use included cigarettes. She has a 10.00 pack-year smoking history. She has never used smokeless tobacco.   reports no history of alcohol use. reports no history of drug use. Review of Systems  General Denies any fever or chills  HEENT  Positive headache   Resp Denies any shortness of breath, cough or wheezing  Cardiac Denies any chest pain, palpitations, claudication or edema  GI Denies any melena, hematochezia, hematemesis or pyrosis   Denies any frequency, urgency, hesitancy or incontinence  Heme Denies bruising or bleeding easily  Endocrine Denies any history of diabetes or thyroid disease  Neuro Denies any focal motor or sensory deficits    PHYSICAL:   VITALS:  height is 5' 5\" (1.651 m) and weight is 157 lb (71.2 kg). Her oral temperature is 97.9 °F (36.6 °C). Her blood pressure is 170/75 (abnormal) and her pulse is 84. Her respiration is 19 and oxygen saturation is 94%.    CONSTITUTIONAL: no acute distress and cooperative to examination. HEENT: Craniotomy surgical scar intact with staples   NECK: Soft, trachea midline and straight  LUNGS: Chest expands equally bilaterally upon respiration, no accessory muscle used. CARDIOVASCULAR: Regular rate and rhythm   ABDOMEN: soft, nontender, nondistended, pain pump palpated in RLQ no guarding or peritoneal signs. NEUROLOGIC: There are no focalizing motor or sensory deficits  EXTREMITIES: no cyanosis, clubbing or edema    LABS:   No new labs to review    RADIOLOGY:   No new imaging to review     Thank you for the interesting evaluation. Further recommendations to follow.     Calderon Petersen MD  9/3/2022, 1:14 PM No

## 2023-06-27 ENCOUNTER — HOSPITAL ENCOUNTER (OUTPATIENT)
Age: 78
Discharge: HOME | End: 2023-06-27
Payer: MEDICARE

## 2023-06-27 DIAGNOSIS — E03.9 HYPOTHYROIDISM, UNSPECIFIED TYPE: ICD-10-CM

## 2023-06-27 DIAGNOSIS — E78.2 MIXED HYPERLIPIDEMIA: ICD-10-CM

## 2023-06-27 DIAGNOSIS — R73.01 IMPAIRED FASTING GLUCOSE: ICD-10-CM

## 2023-06-27 LAB
ALT SERPL-CCNC: 14 U/L (ref 5–33)
ANION GAP SERPL CALCULATED.3IONS-SCNC: 9 MMOL/L (ref 9–17)
AST SERPL-CCNC: 24 U/L
BUN SERPL-MCNC: 12 MG/DL (ref 8–23)
BUN/CREAT SERPL: 19 (ref 9–20)
CALCIUM SERPL-MCNC: 9.5 MG/DL (ref 8.6–10.4)
CHLORIDE SERPL-SCNC: 106 MMOL/L (ref 98–107)
CHOLEST SERPL-MCNC: 210 MG/DL
CHOLESTEROL/HDL RATIO: 2.5
CO2 SERPL-SCNC: 27 MMOL/L (ref 20–31)
CREAT SERPL-MCNC: 0.64 MG/DL (ref 0.5–0.9)
ERYTHROCYTE [DISTWIDTH] IN BLOOD BY AUTOMATED COUNT: 13.2 % (ref 11.8–14.4)
GFR SERPL CREATININE-BSD FRML MDRD: >60 ML/MIN/1.73M2
GLUCOSE SERPL-MCNC: 86 MG/DL (ref 70–99)
HCT VFR BLD AUTO: 39.8 % (ref 36.3–47.1)
HDLC SERPL-MCNC: 83 MG/DL
HGB BLD-MCNC: 13.1 G/DL (ref 11.9–15.1)
LDLC SERPL CALC-MCNC: 118 MG/DL (ref 0–130)
MCH RBC QN AUTO: 31.9 PG (ref 25.2–33.5)
MCHC RBC AUTO-ENTMCNC: 32.9 G/DL (ref 28.4–34.8)
MCV RBC AUTO: 96.8 FL (ref 82.6–102.9)
NRBC BLD-RTO: 0 PER 100 WBC
PLATELET # BLD AUTO: 225 K/UL (ref 138–453)
PMV BLD AUTO: 9.5 FL (ref 8.1–13.5)
POTASSIUM SERPL-SCNC: 4.3 MMOL/L (ref 3.7–5.3)
RBC # BLD AUTO: 4.11 M/UL (ref 3.95–5.11)
SODIUM SERPL-SCNC: 142 MMOL/L (ref 135–144)
TRIGL SERPL-MCNC: 45 MG/DL
TSH SERPL DL<=0.05 MIU/L-ACNC: 3.34 UIU/ML (ref 0.3–5)
WBC OTHER # BLD: 4.8 K/UL (ref 3.5–11.3)

## 2023-06-27 PROCEDURE — 80061 LIPID PANEL: CPT

## 2023-06-27 PROCEDURE — 84460 ALANINE AMINO (ALT) (SGPT): CPT

## 2023-06-27 PROCEDURE — 36415 COLL VENOUS BLD VENIPUNCTURE: CPT

## 2023-06-27 PROCEDURE — 84443 ASSAY THYROID STIM HORMONE: CPT

## 2023-06-27 PROCEDURE — 84439 ASSAY OF FREE THYROXINE: CPT

## 2023-06-27 PROCEDURE — 83036 HEMOGLOBIN GLYCOSYLATED A1C: CPT

## 2023-06-27 PROCEDURE — 85027 COMPLETE CBC AUTOMATED: CPT

## 2023-06-27 PROCEDURE — 84450 TRANSFERASE (AST) (SGOT): CPT

## 2023-06-27 PROCEDURE — 84481 FREE ASSAY (FT-3): CPT

## 2023-06-27 PROCEDURE — 80048 BASIC METABOLIC PNL TOTAL CA: CPT

## 2023-06-28 LAB
EST. AVERAGE GLUCOSE BLD GHB EST-MCNC: 108 MG/DL
HBA1C MFR BLD: 5.4 % (ref 4–6)
T4 FREE SERPL-MCNC: 1.2 NG/DL (ref 0.9–1.7)

## 2023-06-29 ENCOUNTER — APPOINTMENT (OUTPATIENT)
Dept: GENERAL RADIOLOGY | Age: 78
End: 2023-06-29
Payer: MEDICARE

## 2023-06-29 ENCOUNTER — APPOINTMENT (OUTPATIENT)
Dept: CT IMAGING | Age: 78
End: 2023-06-29
Payer: MEDICARE

## 2023-06-29 ENCOUNTER — HOSPITAL ENCOUNTER (INPATIENT)
Age: 78
LOS: 2 days | Discharge: HOME | End: 2023-07-01
Attending: STUDENT IN AN ORGANIZED HEALTH CARE EDUCATION/TRAINING PROGRAM | Admitting: INTERNAL MEDICINE
Payer: MEDICARE

## 2023-06-29 DIAGNOSIS — J44.1 COPD EXACERBATION (HCC): Primary | ICD-10-CM

## 2023-06-29 DIAGNOSIS — J18.9 PNEUMONIA OF RIGHT LUNG DUE TO INFECTIOUS ORGANISM, UNSPECIFIED PART OF LUNG: ICD-10-CM

## 2023-06-29 PROBLEM — J96.02 ACUTE RESPIRATORY FAILURE WITH HYPOXIA AND HYPERCAPNIA (HCC): Status: ACTIVE | Noted: 2023-06-29

## 2023-06-29 PROBLEM — J96.01 ACUTE RESPIRATORY FAILURE WITH HYPOXIA AND HYPERCAPNIA (HCC): Status: ACTIVE | Noted: 2023-06-29

## 2023-06-29 LAB
ALBUMIN SERPL-MCNC: 4 G/DL (ref 3.5–5.2)
ALBUMIN/GLOB SERPL: 1.5 {RATIO} (ref 1–2.5)
ALP SERPL-CCNC: 71 U/L (ref 35–104)
ALT SERPL-CCNC: 19 U/L (ref 5–33)
ANION GAP SERPL CALCULATED.3IONS-SCNC: 9 MMOL/L (ref 9–17)
AST SERPL-CCNC: 33 U/L
BASOPHILS # BLD: 0.03 K/UL (ref 0–0.2)
BASOPHILS NFR BLD: 0 % (ref 0–2)
BILIRUB SERPL-MCNC: 0.4 MG/DL (ref 0.3–1.2)
BILIRUB UR QL STRIP: NEGATIVE
BNP SERPL-MCNC: 458 PG/ML
BODY TEMPERATURE: 37
BUN SERPL-MCNC: 21 MG/DL (ref 8–23)
BUN/CREAT SERPL: 25 (ref 9–20)
CALCIUM SERPL-MCNC: 10 MG/DL (ref 8.6–10.4)
CHLORIDE SERPL-SCNC: 103 MMOL/L (ref 98–107)
CLARITY UR: CLEAR
CO2 SERPL-SCNC: 24 MMOL/L (ref 20–31)
COLOR UR: YELLOW
CREAT SERPL-MCNC: 0.84 MG/DL (ref 0.5–0.9)
EOSINOPHIL # BLD: <0.03 K/UL (ref 0–0.44)
EOSINOPHILS RELATIVE PERCENT: 0 % (ref 1–4)
ERYTHROCYTE [DISTWIDTH] IN BLOOD BY AUTOMATED COUNT: 13.2 % (ref 11.8–14.4)
GAS FLOW.O2 O2 DELIVERY SYS: ABNORMAL L/MIN
GFR SERPL CREATININE-BSD FRML MDRD: >60 ML/MIN/1.73M2
GLUCOSE SERPL-MCNC: 151 MG/DL (ref 70–99)
GLUCOSE UR STRIP-MCNC: NEGATIVE MG/DL
HCO3 VENOUS: 24.6 MMOL/L (ref 24–30)
HCT VFR BLD AUTO: 39.2 % (ref 36.3–47.1)
HGB BLD-MCNC: 13 G/DL (ref 11.9–15.1)
HGB UR QL STRIP.AUTO: NEGATIVE
IMM GRANULOCYTES # BLD AUTO: 0.03 K/UL (ref 0–0.3)
IMM GRANULOCYTES NFR BLD: 0 %
KETONES UR STRIP-MCNC: NEGATIVE MG/DL
LACTATE BLDV-SCNC: 1.9 MMOL/L (ref 0.5–2.2)
LEUKOCYTE ESTERASE UR QL STRIP: NEGATIVE
LYMPHOCYTES # BLD: 5 % (ref 24–43)
LYMPHOCYTES NFR BLD: 0.65 K/UL (ref 1.1–3.7)
MCH RBC QN AUTO: 31.7 PG (ref 25.2–33.5)
MCHC RBC AUTO-ENTMCNC: 33.2 G/DL (ref 28.4–34.8)
MCV RBC AUTO: 95.6 FL (ref 82.6–102.9)
MONOCYTES NFR BLD: 0.54 K/UL (ref 0.1–1.2)
MONOCYTES NFR BLD: 5 % (ref 3–12)
NEGATIVE BASE EXCESS, VEN: 1.6 MMOL/L (ref 0–2)
NEUTROPHILS NFR BLD: 90 % (ref 36–65)
NEUTS SEG NFR BLD: 10.86 K/UL (ref 1.5–8.1)
NITRITE UR QL STRIP: NEGATIVE
NRBC BLD-RTO: 0 PER 100 WBC
O2 SAT, VEN: 85.5 % (ref 60–85)
PCO2, VEN: 46.8 MM HG (ref 39–55)
PH UR STRIP: 6 [PH] (ref 5–9)
PH VENOUS: 7.34 (ref 7.32–7.42)
PLATELET # BLD AUTO: 206 K/UL (ref 138–453)
PMV BLD AUTO: 9.7 FL (ref 8.1–13.5)
PO2, VEN: 53.4 MM HG (ref 30–50)
POTASSIUM SERPL-SCNC: 3.9 MMOL/L (ref 3.7–5.3)
PROT SERPL-MCNC: 6.6 G/DL (ref 6.4–8.3)
PROT UR STRIP-MCNC: NEGATIVE MG/DL
PT. POSITION: ABNORMAL
RBC # BLD AUTO: 4.1 M/UL (ref 3.95–5.11)
RESPIRATORY RATE: 17
SARS-COV-2 RDRP RESP QL NAA+PROBE: NOT DETECTED
SODIUM SERPL-SCNC: 136 MMOL/L (ref 135–144)
SP GR UR STRIP: <1.005 (ref 1.01–1.02)
SPECIMEN DESCRIPTION: NORMAL
T3FREE SERPL-MCNC: 2.37 PG/ML (ref 2.02–4.43)
TROPONIN I SERPL HS-MCNC: 17 NG/L (ref 0–14)
TSH SERPL DL<=0.05 MIU/L-ACNC: 1.45 UIU/ML (ref 0.3–5)
UROBILINOGEN UR STRIP-ACNC: NORMAL
WBC OTHER # BLD: 12.1 K/UL (ref 3.5–11.3)

## 2023-06-29 PROCEDURE — 96367 TX/PROPH/DG ADDL SEQ IV INF: CPT

## 2023-06-29 PROCEDURE — 99285 EMERGENCY DEPT VISIT HI MDM: CPT

## 2023-06-29 PROCEDURE — 71046 X-RAY EXAM CHEST 2 VIEWS: CPT

## 2023-06-29 PROCEDURE — 81003 URINALYSIS AUTO W/O SCOPE: CPT

## 2023-06-29 PROCEDURE — 84145 PROCALCITONIN (PCT): CPT

## 2023-06-29 PROCEDURE — 6370000000 HC RX 637 (ALT 250 FOR IP): Performed by: STUDENT IN AN ORGANIZED HEALTH CARE EDUCATION/TRAINING PROGRAM

## 2023-06-29 PROCEDURE — 84484 ASSAY OF TROPONIN QUANT: CPT

## 2023-06-29 PROCEDURE — 84443 ASSAY THYROID STIM HORMONE: CPT

## 2023-06-29 PROCEDURE — 2700000000 HC OXYGEN THERAPY PER DAY

## 2023-06-29 PROCEDURE — 94664 DEMO&/EVAL PT USE INHALER: CPT

## 2023-06-29 PROCEDURE — 96365 THER/PROPH/DIAG IV INF INIT: CPT

## 2023-06-29 PROCEDURE — 83880 ASSAY OF NATRIURETIC PEPTIDE: CPT

## 2023-06-29 PROCEDURE — 6360000002 HC RX W HCPCS: Performed by: EMERGENCY MEDICINE

## 2023-06-29 PROCEDURE — 96375 TX/PRO/DX INJ NEW DRUG ADDON: CPT

## 2023-06-29 PROCEDURE — 80053 COMPREHEN METABOLIC PANEL: CPT

## 2023-06-29 PROCEDURE — 87635 SARS-COV-2 COVID-19 AMP PRB: CPT

## 2023-06-29 PROCEDURE — 6360000004 HC RX CONTRAST MEDICATION: Performed by: EMERGENCY MEDICINE

## 2023-06-29 PROCEDURE — 83605 ASSAY OF LACTIC ACID: CPT

## 2023-06-29 PROCEDURE — 6360000002 HC RX W HCPCS: Performed by: STUDENT IN AN ORGANIZED HEALTH CARE EDUCATION/TRAINING PROGRAM

## 2023-06-29 PROCEDURE — 36415 COLL VENOUS BLD VENIPUNCTURE: CPT

## 2023-06-29 PROCEDURE — 71260 CT THORAX DX C+: CPT

## 2023-06-29 PROCEDURE — 2580000003 HC RX 258

## 2023-06-29 PROCEDURE — 82805 BLOOD GASES W/O2 SATURATION: CPT

## 2023-06-29 PROCEDURE — 87040 BLOOD CULTURE FOR BACTERIA: CPT

## 2023-06-29 PROCEDURE — 93005 ELECTROCARDIOGRAM TRACING: CPT | Performed by: STUDENT IN AN ORGANIZED HEALTH CARE EDUCATION/TRAINING PROGRAM

## 2023-06-29 PROCEDURE — 85027 COMPLETE CBC AUTOMATED: CPT

## 2023-06-29 PROCEDURE — 94640 AIRWAY INHALATION TREATMENT: CPT

## 2023-06-29 PROCEDURE — 6360000002 HC RX W HCPCS

## 2023-06-29 PROCEDURE — 2000000000 HC ICU R&B

## 2023-06-29 PROCEDURE — 2580000003 HC RX 258: Performed by: EMERGENCY MEDICINE

## 2023-06-29 RX ORDER — WATER 1000 ML/1000ML
INJECTION, SOLUTION INTRAVENOUS
Status: COMPLETED
Start: 2023-06-29 | End: 2023-06-29

## 2023-06-29 RX ORDER — MAGNESIUM SULFATE IN WATER 40 MG/ML
2000 INJECTION, SOLUTION INTRAVENOUS ONCE
Status: COMPLETED | OUTPATIENT
Start: 2023-06-29 | End: 2023-06-29

## 2023-06-29 RX ORDER — ONDANSETRON 2 MG/ML
INJECTION INTRAMUSCULAR; INTRAVENOUS
Status: COMPLETED
Start: 2023-06-29 | End: 2023-06-29

## 2023-06-29 RX ORDER — IPRATROPIUM BROMIDE AND ALBUTEROL SULFATE 2.5; .5 MG/3ML; MG/3ML
1 SOLUTION RESPIRATORY (INHALATION) ONCE
Status: COMPLETED | OUTPATIENT
Start: 2023-06-29 | End: 2023-06-29

## 2023-06-29 RX ORDER — 0.9 % SODIUM CHLORIDE 0.9 %
1000 INTRAVENOUS SOLUTION INTRAVENOUS ONCE
Status: COMPLETED | OUTPATIENT
Start: 2023-06-29 | End: 2023-06-29

## 2023-06-29 RX ORDER — ONDANSETRON 2 MG/ML
4 INJECTION INTRAMUSCULAR; INTRAVENOUS ONCE
Status: COMPLETED | OUTPATIENT
Start: 2023-06-29 | End: 2023-06-29

## 2023-06-29 RX ORDER — METHYLPREDNISOLONE SODIUM SUCCINATE 125 MG/2ML
125 INJECTION, POWDER, LYOPHILIZED, FOR SOLUTION INTRAMUSCULAR; INTRAVENOUS ONCE
Status: COMPLETED | OUTPATIENT
Start: 2023-06-29 | End: 2023-06-29

## 2023-06-29 RX ADMIN — METHYLPREDNISOLONE SODIUM SUCCINATE 125 MG: 125 INJECTION INTRAMUSCULAR; INTRAVENOUS at 19:54

## 2023-06-29 RX ADMIN — IPRATROPIUM BROMIDE AND ALBUTEROL SULFATE 1 DOSE: .5; 3 SOLUTION RESPIRATORY (INHALATION) at 18:50

## 2023-06-29 RX ADMIN — CEFTRIAXONE SODIUM 1000 MG: 1 INJECTION, POWDER, FOR SOLUTION INTRAMUSCULAR; INTRAVENOUS at 21:34

## 2023-06-29 RX ADMIN — WATER: 1 INJECTION INTRAMUSCULAR; INTRAVENOUS; SUBCUTANEOUS at 20:06

## 2023-06-29 RX ADMIN — IPRATROPIUM BROMIDE AND ALBUTEROL SULFATE 1 DOSE: .5; 3 SOLUTION RESPIRATORY (INHALATION) at 18:54

## 2023-06-29 RX ADMIN — SODIUM CHLORIDE 1000 ML: 9 INJECTION, SOLUTION INTRAVENOUS at 21:24

## 2023-06-29 RX ADMIN — IOPAMIDOL 75 ML: 755 INJECTION, SOLUTION INTRAVENOUS at 20:50

## 2023-06-29 RX ADMIN — ONDANSETRON 4 MG: 2 INJECTION INTRAMUSCULAR; INTRAVENOUS at 20:06

## 2023-06-29 RX ADMIN — IPRATROPIUM BROMIDE AND ALBUTEROL SULFATE 1 DOSE: .5; 3 SOLUTION RESPIRATORY (INHALATION) at 18:47

## 2023-06-29 RX ADMIN — AZITHROMYCIN MONOHYDRATE 500 MG: 500 INJECTION, POWDER, LYOPHILIZED, FOR SOLUTION INTRAVENOUS at 21:31

## 2023-06-29 RX ADMIN — MAGNESIUM SULFATE HEPTAHYDRATE 2000 MG: 40 INJECTION, SOLUTION INTRAVENOUS at 20:01

## 2023-06-29 ASSESSMENT — PAIN - FUNCTIONAL ASSESSMENT: PAIN_FUNCTIONAL_ASSESSMENT: NONE - DENIES PAIN

## 2023-06-29 ASSESSMENT — LIFESTYLE VARIABLES: HOW OFTEN DO YOU HAVE A DRINK CONTAINING ALCOHOL: NEVER

## 2023-06-30 PROBLEM — J18.9 MULTIFOCAL PNEUMONIA: Status: RESOLVED | Noted: 2023-02-22 | Resolved: 2023-06-30

## 2023-06-30 PROBLEM — E87.20 LACTIC ACIDOSIS: Status: RESOLVED | Noted: 2020-08-16 | Resolved: 2023-06-30

## 2023-06-30 PROBLEM — J18.9 SEPSIS DUE TO PNEUMONIA (HCC): Status: RESOLVED | Noted: 2022-10-08 | Resolved: 2023-06-30

## 2023-06-30 PROBLEM — A41.9 SEPSIS DUE TO PNEUMONIA (HCC): Status: RESOLVED | Noted: 2022-10-08 | Resolved: 2023-06-30

## 2023-06-30 PROBLEM — Z99.81 OXYGEN DEPENDENT: Chronic | Status: ACTIVE | Noted: 2023-02-22

## 2023-06-30 LAB
B PARAP IS1001 DNA NPH QL NAA+NON-PROBE: NOT DETECTED
B PERT DNA SPEC QL NAA+PROBE: NOT DETECTED
C PNEUM DNA NPH QL NAA+NON-PROBE: NOT DETECTED
EKG ATRIAL RATE: 100 BPM
EKG P AXIS: 50 DEGREES
EKG P-R INTERVAL: 186 MS
EKG Q-T INTERVAL: 340 MS
EKG QRS DURATION: 86 MS
EKG QTC CALCULATION (BAZETT): 438 MS
EKG R AXIS: -23 DEGREES
EKG T AXIS: 19 DEGREES
EKG VENTRICULAR RATE: 100 BPM
FLUAV RNA NPH QL NAA+NON-PROBE: NOT DETECTED
FLUBV RNA NPH QL NAA+NON-PROBE: NOT DETECTED
HADV DNA NPH QL NAA+NON-PROBE: NOT DETECTED
HCOV 229E RNA NPH QL NAA+NON-PROBE: NOT DETECTED
HCOV HKU1 RNA NPH QL NAA+NON-PROBE: NOT DETECTED
HCOV NL63 RNA NPH QL NAA+NON-PROBE: NOT DETECTED
HCOV OC43 RNA NPH QL NAA+NON-PROBE: NOT DETECTED
HMPV RNA NPH QL NAA+NON-PROBE: NOT DETECTED
HPIV1 RNA NPH QL NAA+NON-PROBE: NOT DETECTED
HPIV2 RNA NPH QL NAA+NON-PROBE: NOT DETECTED
HPIV3 RNA NPH QL NAA+NON-PROBE: NOT DETECTED
HPIV4 RNA NPH QL NAA+NON-PROBE: NOT DETECTED
M PNEUMO DNA NPH QL NAA+NON-PROBE: NOT DETECTED
PROCALCITONIN SERPL-MCNC: 9.18 NG/ML
RSV RNA NPH QL NAA+NON-PROBE: NOT DETECTED
RV+EV RNA NPH QL NAA+NON-PROBE: NOT DETECTED
SARS-COV-2 RNA NPH QL NAA+NON-PROBE: NOT DETECTED
SPECIMEN DESCRIPTION: NORMAL

## 2023-06-30 PROCEDURE — 97110 THERAPEUTIC EXERCISES: CPT

## 2023-06-30 PROCEDURE — 1200000000 HC SEMI PRIVATE

## 2023-06-30 PROCEDURE — 6370000000 HC RX 637 (ALT 250 FOR IP): Performed by: INTERNAL MEDICINE

## 2023-06-30 PROCEDURE — 94664 DEMO&/EVAL PT USE INHALER: CPT

## 2023-06-30 PROCEDURE — 2580000003 HC RX 258

## 2023-06-30 PROCEDURE — 93010 ELECTROCARDIOGRAM REPORT: CPT | Performed by: FAMILY MEDICINE

## 2023-06-30 PROCEDURE — 6370000000 HC RX 637 (ALT 250 FOR IP): Performed by: STUDENT IN AN ORGANIZED HEALTH CARE EDUCATION/TRAINING PROGRAM

## 2023-06-30 PROCEDURE — 94761 N-INVAS EAR/PLS OXIMETRY MLT: CPT

## 2023-06-30 PROCEDURE — 97162 PT EVAL MOD COMPLEX 30 MIN: CPT

## 2023-06-30 PROCEDURE — 0202U NFCT DS 22 TRGT SARS-COV-2: CPT

## 2023-06-30 PROCEDURE — 2700000000 HC OXYGEN THERAPY PER DAY

## 2023-06-30 PROCEDURE — 97166 OT EVAL MOD COMPLEX 45 MIN: CPT

## 2023-06-30 PROCEDURE — 94669 MECHANICAL CHEST WALL OSCILL: CPT

## 2023-06-30 PROCEDURE — 6370000000 HC RX 637 (ALT 250 FOR IP): Performed by: EMERGENCY MEDICINE

## 2023-06-30 PROCEDURE — 6360000002 HC RX W HCPCS: Performed by: INTERNAL MEDICINE

## 2023-06-30 PROCEDURE — 94640 AIRWAY INHALATION TREATMENT: CPT

## 2023-06-30 PROCEDURE — 92610 EVALUATE SWALLOWING FUNCTION: CPT

## 2023-06-30 PROCEDURE — 2580000003 HC RX 258: Performed by: INTERNAL MEDICINE

## 2023-06-30 RX ORDER — CITALOPRAM 10 MG/1
10 TABLET ORAL DAILY
Status: DISCONTINUED | OUTPATIENT
Start: 2023-06-30 | End: 2023-07-01 | Stop reason: HOSPADM

## 2023-06-30 RX ORDER — SERTRALINE HYDROCHLORIDE 100 MG/1
100 TABLET, FILM COATED ORAL DAILY
Status: DISCONTINUED | OUTPATIENT
Start: 2023-06-30 | End: 2023-07-01 | Stop reason: HOSPADM

## 2023-06-30 RX ORDER — ONDANSETRON 4 MG/1
4 TABLET, ORALLY DISINTEGRATING ORAL EVERY 8 HOURS PRN
Status: DISCONTINUED | OUTPATIENT
Start: 2023-06-30 | End: 2023-07-01 | Stop reason: HOSPADM

## 2023-06-30 RX ORDER — TIZANIDINE 4 MG/1
4 TABLET ORAL DAILY
Status: DISCONTINUED | OUTPATIENT
Start: 2023-06-30 | End: 2023-07-01 | Stop reason: HOSPADM

## 2023-06-30 RX ORDER — IPRATROPIUM BROMIDE AND ALBUTEROL SULFATE 2.5; .5 MG/3ML; MG/3ML
1 SOLUTION RESPIRATORY (INHALATION) EVERY 4 HOURS PRN
Status: DISCONTINUED | OUTPATIENT
Start: 2023-06-30 | End: 2023-06-30

## 2023-06-30 RX ORDER — LEVOTHYROXINE SODIUM 0.1 MG/1
200 TABLET ORAL DAILY
Status: DISCONTINUED | OUTPATIENT
Start: 2023-06-30 | End: 2023-07-01 | Stop reason: HOSPADM

## 2023-06-30 RX ORDER — SODIUM CHLORIDE 9 MG/ML
INJECTION, SOLUTION INTRAVENOUS CONTINUOUS
Status: DISCONTINUED | OUTPATIENT
Start: 2023-06-30 | End: 2023-07-01 | Stop reason: HOSPADM

## 2023-06-30 RX ORDER — PREGABALIN 75 MG/1
300 CAPSULE ORAL 2 TIMES DAILY
Status: DISCONTINUED | OUTPATIENT
Start: 2023-06-30 | End: 2023-07-01 | Stop reason: HOSPADM

## 2023-06-30 RX ORDER — AZITHROMYCIN 250 MG/1
500 TABLET, FILM COATED ORAL EVERY 24 HOURS
Status: DISCONTINUED | OUTPATIENT
Start: 2023-06-30 | End: 2023-07-01 | Stop reason: HOSPADM

## 2023-06-30 RX ORDER — WATER 1000 ML/1000ML
INJECTION, SOLUTION INTRAVENOUS
Status: DISPENSED
Start: 2023-06-30 | End: 2023-07-01

## 2023-06-30 RX ORDER — HYDROCODONE BITARTRATE AND ACETAMINOPHEN 10; 325 MG/1; MG/1
1 TABLET ORAL EVERY 8 HOURS PRN
Status: DISCONTINUED | OUTPATIENT
Start: 2023-06-30 | End: 2023-07-01 | Stop reason: HOSPADM

## 2023-06-30 RX ORDER — SODIUM CHLORIDE 0.9 % (FLUSH) 0.9 %
5-40 SYRINGE (ML) INJECTION EVERY 12 HOURS SCHEDULED
Status: DISCONTINUED | OUTPATIENT
Start: 2023-06-30 | End: 2023-07-01 | Stop reason: HOSPADM

## 2023-06-30 RX ORDER — MONTELUKAST SODIUM 10 MG/1
10 TABLET ORAL NIGHTLY
Status: DISCONTINUED | OUTPATIENT
Start: 2023-06-30 | End: 2023-07-01 | Stop reason: HOSPADM

## 2023-06-30 RX ORDER — SODIUM CHLORIDE 0.9 % (FLUSH) 0.9 %
5-40 SYRINGE (ML) INJECTION PRN
Status: DISCONTINUED | OUTPATIENT
Start: 2023-06-30 | End: 2023-07-01 | Stop reason: HOSPADM

## 2023-06-30 RX ORDER — ASPIRIN 81 MG/1
81 TABLET ORAL DAILY
Status: DISCONTINUED | OUTPATIENT
Start: 2023-06-30 | End: 2023-07-01 | Stop reason: HOSPADM

## 2023-06-30 RX ORDER — GUAIFENESIN 600 MG/1
600 TABLET, EXTENDED RELEASE ORAL 2 TIMES DAILY
Status: DISCONTINUED | OUTPATIENT
Start: 2023-06-30 | End: 2023-07-01 | Stop reason: HOSPADM

## 2023-06-30 RX ORDER — ENOXAPARIN SODIUM 100 MG/ML
40 INJECTION SUBCUTANEOUS DAILY
Status: DISCONTINUED | OUTPATIENT
Start: 2023-06-30 | End: 2023-06-30 | Stop reason: ALTCHOICE

## 2023-06-30 RX ORDER — TRAZODONE HYDROCHLORIDE 50 MG/1
200 TABLET ORAL NIGHTLY
Status: DISCONTINUED | OUTPATIENT
Start: 2023-07-01 | End: 2023-06-30

## 2023-06-30 RX ORDER — WATER 1000 ML/1000ML
INJECTION, SOLUTION INTRAVENOUS
Status: COMPLETED
Start: 2023-06-30 | End: 2023-06-30

## 2023-06-30 RX ORDER — BUMETANIDE 1 MG/1
2 TABLET ORAL DAILY
Status: DISCONTINUED | OUTPATIENT
Start: 2023-06-30 | End: 2023-07-01 | Stop reason: HOSPADM

## 2023-06-30 RX ORDER — ONDANSETRON 2 MG/ML
4 INJECTION INTRAMUSCULAR; INTRAVENOUS EVERY 6 HOURS PRN
Status: DISCONTINUED | OUTPATIENT
Start: 2023-06-30 | End: 2023-07-01 | Stop reason: HOSPADM

## 2023-06-30 RX ORDER — POLYETHYLENE GLYCOL 3350 17 G/17G
17 POWDER, FOR SOLUTION ORAL DAILY PRN
Status: DISCONTINUED | OUTPATIENT
Start: 2023-06-30 | End: 2023-07-01 | Stop reason: HOSPADM

## 2023-06-30 RX ORDER — FAMOTIDINE 20 MG/1
20 TABLET, FILM COATED ORAL 2 TIMES DAILY
Status: DISCONTINUED | OUTPATIENT
Start: 2023-06-30 | End: 2023-07-01 | Stop reason: HOSPADM

## 2023-06-30 RX ORDER — METHYLPREDNISOLONE SODIUM SUCCINATE 125 MG/2ML
60 INJECTION, POWDER, LYOPHILIZED, FOR SOLUTION INTRAMUSCULAR; INTRAVENOUS EVERY 12 HOURS
Status: DISCONTINUED | OUTPATIENT
Start: 2023-06-30 | End: 2023-07-01 | Stop reason: HOSPADM

## 2023-06-30 RX ORDER — HYDROXYCHLOROQUINE SULFATE 200 MG/1
300 TABLET, FILM COATED ORAL DAILY
Status: DISCONTINUED | OUTPATIENT
Start: 2023-06-30 | End: 2023-07-01 | Stop reason: HOSPADM

## 2023-06-30 RX ORDER — IPRATROPIUM BROMIDE AND ALBUTEROL SULFATE 2.5; .5 MG/3ML; MG/3ML
1 SOLUTION RESPIRATORY (INHALATION) 4 TIMES DAILY
Status: DISCONTINUED | OUTPATIENT
Start: 2023-06-30 | End: 2023-07-01 | Stop reason: HOSPADM

## 2023-06-30 RX ORDER — SODIUM CHLORIDE 9 MG/ML
INJECTION, SOLUTION INTRAVENOUS PRN
Status: DISCONTINUED | OUTPATIENT
Start: 2023-06-30 | End: 2023-07-01 | Stop reason: HOSPADM

## 2023-06-30 RX ORDER — ALBUTEROL SULFATE 2.5 MG/3ML
2.5 SOLUTION RESPIRATORY (INHALATION) EVERY 4 HOURS PRN
Status: DISCONTINUED | OUTPATIENT
Start: 2023-06-30 | End: 2023-07-01 | Stop reason: HOSPADM

## 2023-06-30 RX ORDER — IPRATROPIUM BROMIDE AND ALBUTEROL SULFATE 2.5; .5 MG/3ML; MG/3ML
1 SOLUTION RESPIRATORY (INHALATION) ONCE
Status: COMPLETED | OUTPATIENT
Start: 2023-06-30 | End: 2023-06-30

## 2023-06-30 RX ORDER — TRAZODONE HYDROCHLORIDE 50 MG/1
200 TABLET ORAL NIGHTLY
Status: DISCONTINUED | OUTPATIENT
Start: 2023-06-30 | End: 2023-07-01 | Stop reason: HOSPADM

## 2023-06-30 RX ORDER — ACETAMINOPHEN 325 MG/1
650 TABLET ORAL EVERY 6 HOURS PRN
Status: DISCONTINUED | OUTPATIENT
Start: 2023-06-30 | End: 2023-07-01 | Stop reason: HOSPADM

## 2023-06-30 RX ORDER — ACETAMINOPHEN 650 MG/1
650 SUPPOSITORY RECTAL EVERY 6 HOURS PRN
Status: DISCONTINUED | OUTPATIENT
Start: 2023-06-30 | End: 2023-07-01 | Stop reason: HOSPADM

## 2023-06-30 RX ORDER — PANTOPRAZOLE SODIUM 40 MG/1
40 TABLET, DELAYED RELEASE ORAL
Status: DISCONTINUED | OUTPATIENT
Start: 2023-06-30 | End: 2023-07-01 | Stop reason: HOSPADM

## 2023-06-30 RX ORDER — TRAZODONE HYDROCHLORIDE 50 MG/1
100 TABLET ORAL NIGHTLY
Status: DISCONTINUED | OUTPATIENT
Start: 2023-06-30 | End: 2023-06-30

## 2023-06-30 RX ADMIN — METHYLPREDNISOLONE SODIUM SUCCINATE 60 MG: 125 INJECTION INTRAMUSCULAR; INTRAVENOUS at 09:04

## 2023-06-30 RX ADMIN — LEVOTHYROXINE SODIUM 200 MCG: 100 TABLET ORAL at 09:04

## 2023-06-30 RX ADMIN — HYDROCODONE BITARTRATE AND ACETAMINOPHEN 1 TABLET: 10; 325 TABLET ORAL at 21:20

## 2023-06-30 RX ADMIN — PREGABALIN 300 MG: 75 CAPSULE ORAL at 09:03

## 2023-06-30 RX ADMIN — MONTELUKAST 10 MG: 10 TABLET, FILM COATED ORAL at 21:20

## 2023-06-30 RX ADMIN — IPRATROPIUM BROMIDE AND ALBUTEROL SULFATE 1 DOSE: .5; 3 SOLUTION RESPIRATORY (INHALATION) at 20:22

## 2023-06-30 RX ADMIN — PANTOPRAZOLE SODIUM 40 MG: 40 TABLET, DELAYED RELEASE ORAL at 09:04

## 2023-06-30 RX ADMIN — METHYLPREDNISOLONE SODIUM SUCCINATE 60 MG: 125 INJECTION INTRAMUSCULAR; INTRAVENOUS at 21:10

## 2023-06-30 RX ADMIN — CITALOPRAM HYDROBROMIDE 10 MG: 10 TABLET ORAL at 10:01

## 2023-06-30 RX ADMIN — TRAZODONE HYDROCHLORIDE 200 MG: 50 TABLET ORAL at 23:05

## 2023-06-30 RX ADMIN — TIZANIDINE 4 MG: 4 TABLET ORAL at 09:04

## 2023-06-30 RX ADMIN — SODIUM CHLORIDE: 9 INJECTION, SOLUTION INTRAVENOUS at 10:01

## 2023-06-30 RX ADMIN — PREGABALIN 300 MG: 75 CAPSULE ORAL at 21:20

## 2023-06-30 RX ADMIN — FAMOTIDINE 20 MG: 20 TABLET, FILM COATED ORAL at 09:03

## 2023-06-30 RX ADMIN — IPRATROPIUM BROMIDE AND ALBUTEROL SULFATE 1 DOSE: .5; 3 SOLUTION RESPIRATORY (INHALATION) at 15:52

## 2023-06-30 RX ADMIN — FAMOTIDINE 20 MG: 20 TABLET, FILM COATED ORAL at 21:20

## 2023-06-30 RX ADMIN — GUAIFENESIN 600 MG: 600 TABLET, EXTENDED RELEASE ORAL at 09:03

## 2023-06-30 RX ADMIN — GUAIFENESIN 600 MG: 600 TABLET, EXTENDED RELEASE ORAL at 21:20

## 2023-06-30 RX ADMIN — WATER 10 ML: 1 INJECTION INTRAMUSCULAR; INTRAVENOUS; SUBCUTANEOUS at 09:04

## 2023-06-30 RX ADMIN — CEFTRIAXONE SODIUM 1000 MG: 1 INJECTION, POWDER, FOR SOLUTION INTRAMUSCULAR; INTRAVENOUS at 21:19

## 2023-06-30 RX ADMIN — SERTRALINE 100 MG: 100 TABLET, FILM COATED ORAL at 09:15

## 2023-06-30 RX ADMIN — SODIUM CHLORIDE: 9 INJECTION, SOLUTION INTRAVENOUS at 23:06

## 2023-06-30 RX ADMIN — ASPIRIN 81 MG: 81 TABLET, COATED ORAL at 09:03

## 2023-06-30 RX ADMIN — BUMETANIDE 2 MG: 1 TABLET ORAL at 09:04

## 2023-06-30 RX ADMIN — IPRATROPIUM BROMIDE AND ALBUTEROL SULFATE 1 DOSE: .5; 3 SOLUTION RESPIRATORY (INHALATION) at 07:13

## 2023-06-30 RX ADMIN — RIVAROXABAN 20 MG: 20 TABLET, FILM COATED ORAL at 09:15

## 2023-06-30 RX ADMIN — HYDROXYCHLOROQUINE SULFATE 300 MG: 200 TABLET ORAL at 09:03

## 2023-06-30 RX ADMIN — AZITHROMYCIN MONOHYDRATE 500 MG: 250 TABLET ORAL at 21:20

## 2023-06-30 RX ADMIN — IPRATROPIUM BROMIDE AND ALBUTEROL SULFATE 1 DOSE: .5; 3 SOLUTION RESPIRATORY (INHALATION) at 11:39

## 2023-06-30 ASSESSMENT — PAIN DESCRIPTION - LOCATION
LOCATION: BACK
LOCATION: CHEST;BACK

## 2023-06-30 ASSESSMENT — PAIN DESCRIPTION - DESCRIPTORS: DESCRIPTORS: ACHING

## 2023-06-30 ASSESSMENT — PAIN DESCRIPTION - ORIENTATION: ORIENTATION: LOWER

## 2023-06-30 ASSESSMENT — PAIN DESCRIPTION - FREQUENCY: FREQUENCY: INTERMITTENT

## 2023-06-30 ASSESSMENT — PAIN SCALES - GENERAL
PAINLEVEL_OUTOF10: 6
PAINLEVEL_OUTOF10: 7
PAINLEVEL_OUTOF10: 5

## 2023-06-30 ASSESSMENT — PAIN - FUNCTIONAL ASSESSMENT: PAIN_FUNCTIONAL_ASSESSMENT: ACTIVITIES ARE NOT PREVENTED

## 2023-06-30 ASSESSMENT — PAIN DESCRIPTION - ONSET: ONSET: GRADUAL

## 2023-06-30 ASSESSMENT — PAIN DESCRIPTION - PAIN TYPE: TYPE: CHRONIC PAIN

## 2023-07-01 VITALS
OXYGEN SATURATION: 95 % | TEMPERATURE: 96.8 F | WEIGHT: 153.1 LBS | RESPIRATION RATE: 18 BRPM | BODY MASS INDEX: 25.51 KG/M2 | SYSTOLIC BLOOD PRESSURE: 109 MMHG | DIASTOLIC BLOOD PRESSURE: 54 MMHG | HEART RATE: 75 BPM | HEIGHT: 65 IN

## 2023-07-01 LAB
ANION GAP SERPL CALCULATED.3IONS-SCNC: 9 MMOL/L (ref 9–17)
BASOPHILS # BLD: <0.03 K/UL (ref 0–0.2)
BASOPHILS NFR BLD: 0 % (ref 0–2)
BUN SERPL-MCNC: 13 MG/DL (ref 8–23)
BUN/CREAT SERPL: 31 (ref 9–20)
CALCIUM SERPL-MCNC: 9.5 MG/DL (ref 8.6–10.4)
CHLORIDE SERPL-SCNC: 103 MMOL/L (ref 98–107)
CO2 SERPL-SCNC: 30 MMOL/L (ref 20–31)
CREAT SERPL-MCNC: 0.42 MG/DL (ref 0.5–0.9)
EOSINOPHIL # BLD: <0.03 K/UL (ref 0–0.44)
EOSINOPHILS RELATIVE PERCENT: 0 % (ref 1–4)
ERYTHROCYTE [DISTWIDTH] IN BLOOD BY AUTOMATED COUNT: 13.8 % (ref 11.8–14.4)
GFR SERPL CREATININE-BSD FRML MDRD: >60 ML/MIN/1.73M2
GLUCOSE SERPL-MCNC: 156 MG/DL (ref 70–99)
HCT VFR BLD AUTO: 41.5 % (ref 36.3–47.1)
HGB BLD-MCNC: 13.4 G/DL (ref 11.9–15.1)
IMM GRANULOCYTES # BLD AUTO: 0.14 K/UL (ref 0–0.3)
IMM GRANULOCYTES NFR BLD: 1 %
LYMPHOCYTES # BLD: 3 % (ref 24–43)
LYMPHOCYTES NFR BLD: 0.5 K/UL (ref 1.1–3.7)
MCH RBC QN AUTO: 31.5 PG (ref 25.2–33.5)
MCHC RBC AUTO-ENTMCNC: 32.3 G/DL (ref 28.4–34.8)
MCV RBC AUTO: 97.4 FL (ref 82.6–102.9)
MONOCYTES NFR BLD: 0.2 K/UL (ref 0.1–1.2)
MONOCYTES NFR BLD: 1 % (ref 3–12)
NEUTROPHILS NFR BLD: 95 % (ref 36–65)
NEUTS SEG NFR BLD: 13.74 K/UL (ref 1.5–8.1)
NRBC BLD-RTO: 0 PER 100 WBC
PLATELET # BLD AUTO: 223 K/UL (ref 138–453)
PMV BLD AUTO: 10.1 FL (ref 8.1–13.5)
POTASSIUM SERPL-SCNC: 4 MMOL/L (ref 3.7–5.3)
RBC # BLD AUTO: 4.26 M/UL (ref 3.95–5.11)
SODIUM SERPL-SCNC: 142 MMOL/L (ref 135–144)
WBC OTHER # BLD: 14.6 K/UL (ref 3.5–11.3)

## 2023-07-01 PROCEDURE — 97116 GAIT TRAINING THERAPY: CPT

## 2023-07-01 PROCEDURE — 6360000002 HC RX W HCPCS: Performed by: INTERNAL MEDICINE

## 2023-07-01 PROCEDURE — 80048 BASIC METABOLIC PNL TOTAL CA: CPT

## 2023-07-01 PROCEDURE — 97535 SELF CARE MNGMENT TRAINING: CPT

## 2023-07-01 PROCEDURE — 6370000000 HC RX 637 (ALT 250 FOR IP): Performed by: INTERNAL MEDICINE

## 2023-07-01 PROCEDURE — 2580000003 HC RX 258: Performed by: INTERNAL MEDICINE

## 2023-07-01 PROCEDURE — 85027 COMPLETE CBC AUTOMATED: CPT

## 2023-07-01 PROCEDURE — 94669 MECHANICAL CHEST WALL OSCILL: CPT

## 2023-07-01 PROCEDURE — 2700000000 HC OXYGEN THERAPY PER DAY

## 2023-07-01 PROCEDURE — 2580000003 HC RX 258

## 2023-07-01 PROCEDURE — 94761 N-INVAS EAR/PLS OXIMETRY MLT: CPT

## 2023-07-01 PROCEDURE — 36415 COLL VENOUS BLD VENIPUNCTURE: CPT

## 2023-07-01 PROCEDURE — 94640 AIRWAY INHALATION TREATMENT: CPT

## 2023-07-01 RX ORDER — PREDNISONE 20 MG/1
20 TABLET ORAL 2 TIMES DAILY
Qty: 14 TABLET | Refills: 0 | Status: SHIPPED | OUTPATIENT
Start: 2023-07-01 | End: 2023-07-08

## 2023-07-01 RX ORDER — WATER 1000 ML/1000ML
INJECTION, SOLUTION INTRAVENOUS
Status: COMPLETED
Start: 2023-07-01 | End: 2023-07-01

## 2023-07-01 RX ORDER — AZITHROMYCIN 500 MG/1
500 TABLET, FILM COATED ORAL DAILY
Qty: 2 TABLET | Refills: 0 | Status: SHIPPED | OUTPATIENT
Start: 2023-07-01 | End: 2023-07-03

## 2023-07-01 RX ORDER — AMOXICILLIN AND CLAVULANATE POTASSIUM 875; 125 MG/1; MG/1
1 TABLET, FILM COATED ORAL 2 TIMES DAILY
Qty: 14 TABLET | Refills: 0 | Status: SHIPPED | OUTPATIENT
Start: 2023-07-01 | End: 2023-07-08

## 2023-07-01 RX ADMIN — GUAIFENESIN 600 MG: 600 TABLET, EXTENDED RELEASE ORAL at 09:04

## 2023-07-01 RX ADMIN — TIZANIDINE 4 MG: 4 TABLET ORAL at 09:04

## 2023-07-01 RX ADMIN — SODIUM CHLORIDE, PRESERVATIVE FREE 10 ML: 5 INJECTION INTRAVENOUS at 09:10

## 2023-07-01 RX ADMIN — IPRATROPIUM BROMIDE AND ALBUTEROL SULFATE 1 DOSE: .5; 3 SOLUTION RESPIRATORY (INHALATION) at 10:09

## 2023-07-01 RX ADMIN — ASPIRIN 81 MG: 81 TABLET, COATED ORAL at 09:04

## 2023-07-01 RX ADMIN — LEVOTHYROXINE SODIUM 200 MCG: 100 TABLET ORAL at 09:09

## 2023-07-01 RX ADMIN — WATER 10 ML: 1 INJECTION INTRAMUSCULAR; INTRAVENOUS; SUBCUTANEOUS at 09:04

## 2023-07-01 RX ADMIN — BUMETANIDE 2 MG: 1 TABLET ORAL at 09:03

## 2023-07-01 RX ADMIN — CITALOPRAM HYDROBROMIDE 10 MG: 10 TABLET ORAL at 09:04

## 2023-07-01 RX ADMIN — HYDROXYCHLOROQUINE SULFATE 300 MG: 200 TABLET ORAL at 09:04

## 2023-07-01 RX ADMIN — FAMOTIDINE 20 MG: 20 TABLET, FILM COATED ORAL at 09:04

## 2023-07-01 RX ADMIN — METHYLPREDNISOLONE SODIUM SUCCINATE 60 MG: 125 INJECTION INTRAMUSCULAR; INTRAVENOUS at 09:04

## 2023-07-01 RX ADMIN — PANTOPRAZOLE SODIUM 40 MG: 40 TABLET, DELAYED RELEASE ORAL at 09:51

## 2023-07-01 RX ADMIN — IPRATROPIUM BROMIDE AND ALBUTEROL SULFATE 1 DOSE: .5; 3 SOLUTION RESPIRATORY (INHALATION) at 05:36

## 2023-07-01 RX ADMIN — PREGABALIN 300 MG: 75 CAPSULE ORAL at 09:04

## 2023-07-01 RX ADMIN — RIVAROXABAN 20 MG: 20 TABLET, FILM COATED ORAL at 09:04

## 2023-07-01 RX ADMIN — SERTRALINE 100 MG: 100 TABLET, FILM COATED ORAL at 09:03

## 2023-07-01 ASSESSMENT — PAIN SCALES - GENERAL: PAINLEVEL_OUTOF10: 0

## 2023-07-03 ENCOUNTER — CARE COORDINATION (OUTPATIENT)
Dept: CASE MANAGEMENT | Age: 78
End: 2023-07-03

## 2023-07-03 DIAGNOSIS — J96.02 ACUTE RESPIRATORY FAILURE WITH HYPOXIA AND HYPERCAPNIA (HCC): Primary | ICD-10-CM

## 2023-07-03 DIAGNOSIS — J96.01 ACUTE RESPIRATORY FAILURE WITH HYPOXIA AND HYPERCAPNIA (HCC): Primary | ICD-10-CM

## 2023-07-03 PROCEDURE — 1111F DSCHRG MED/CURRENT MED MERGE: CPT | Performed by: INTERNAL MEDICINE

## 2023-07-03 NOTE — CARE COORDINATION
[facility] Care Transitions Initial Follow Up Call    Call within 2 business days of discharge: Yes    Patient Current Location:  Home:  [address] contacted the patient by telephone to perform post hospital discharge assessment. Verified name and  with patient as identifiers. Provided introduction to self, and explanation of the LPN Care Coordinator role. Patient: Lisa Mcfarland Patient : 1945   MRN: [id number]  Reason for Admission: Acute respiratory failure with hypoxia and hypercapnia,acute COPD exacerbation   Discharge Date: 23 RARS: Readmission Risk Score: 17.8      Last Discharge [address] [facility] [facility]       Date Complaint Diagnosis Description Type Department Provider    23 Extremity Weakness; Cough; Shortness of Breath COPD exacerbation ([address]) . .. ED to Hosp-Admission (Discharged) (ADMITTED) Ariel Hernadez MD; Ed Can... Was this an external facility discharge? No Discharge Facility: [facility]    Challenges to be reviewed by the provider   Additional needs identified to be addressed with provider: No  none               Method of communication with provider: none. Writer spoke with Kristine Bojorquez for her initial care transitions call. She was admitted 2023-2023 at [facility] for COPD exacerbation and pneumonia. She reports today she is feeling better, has some SOB but states that is normal for her with her COPD. Continues to have a cough. States her pulse ox was 88% earlier this morning but is now 90%. She wears oxygen at home at 2 lpm. Denies fever,chills,n/v/d. Medications reviewed. She is taking her antibiotics and Prednisone as ordered. She still feels somewhat weak but states it has improved. Her hospital follow up is scheduled for 2023. Mary Sullivan LPN Care Coordinator reviewed discharge instructions with patient who verbalized understanding.  The patient was given an opportunity to ask questions and does not have any

## 2023-07-03 NOTE — PROGRESS NOTES
Physician Progress Note      PATIENT:               Adrianne Evans  CSN #:                  [id number]  :                       1945  ADMIT DATE:       2023 6:22 PM  [address] [place] [address] DATE:        2023 11:07 AM  RESPONDING  PROVIDER #:        Gold Reyes MD          QUERY TEXT:    Patient admitted with acute respiratory failure with hypoxia due to COPD   exacerbation. ED provider documented pneumonia. If possible, please document in the progress notes and discharge summary if   pneumonia was: The medical record reflects the following:  Risk Factors: COPD, home O2  Clinical Indicators: weakness, cough, SOB, WBC 12.1, segs absolute 10.86;      CT chest: Significant patchy consolidations in the right lower and to a lesser   degree right middle lobes compatible with multifocal pneumonia or aspiration. Treatment: IV Rocephin, IV Zithromax, Plaquenil, supplemental O2    Cindy uYn, MSN, RN, CCDS, [facility]  Clinical   [phone]  . Options provided:  -- Pneumonia confirmed after study  -- Pneumonia ruled out after study  -- Other - I will add my own diagnosis  -- Disagree - Not applicable / Not valid  -- Disagree - Clinically unable to determine / Unknown  -- Refer to Clinical Documentation Reviewer    PROVIDER RESPONSE TEXT:    Pneumonia ruled out after study.     Query created by: Cindy Yun on 2023 3:41 PM      Electronically signed by:  Gold Reyes MD 7/3/2023 6:01 PM

## 2023-07-07 ENCOUNTER — CARE COORDINATION (OUTPATIENT)
Dept: CASE MANAGEMENT | Age: 78
End: 2023-07-07

## 2023-07-07 NOTE — CARE COORDINATION
[facility] Care Transitions Follow Up Call    Patient Current Location: [address] [place] [address] Transition Nurse contacted the patient by telephone to follow up after admission on 23. Verified name and  Patient identifiers. Patient: Denzel Cifuentes  Patient : 1945   MRN: [id number]  Reason for Admission: COPD exacerbation  Discharge Date: 23 RARS: Readmission Risk Score: 17.8      Needs to be reviewed by the provider   Additional needs identified to be addressed with provider: No  none             Method of communication with provider: none. Spoke to Blas Rousseau, stated she is doing OK, expecting her new CPAP mask today. Pt matt to PCP visit yesterday. Celexa increased from 10 MG to 20 MG daily. Stated she is still having non productive cough, doing nebulizer treatments. Pt is using IS and Acapella. Appetite is good. Pt has referral to surgeon for thoracic cyst, appt is not until September 3. Stated her 4 kids are coming to their campground this weekend to celebrate her birthday tomorrow. Addressed changes since last contact:  medications-awaiting new CPAP mask, increased Celexa to 20 MG  Discussed follow-up appointments. Follow Up  Future Appointments   Date Time Provider [address]   2023  9:30 AM Kezia Thompson MD [place] [facility]   2023  2:00 PM Anabella Cronin DO [facility]   10/3/2023 10:00 AM Mehreen Ayoub   2024 10:00 AM Annie Marroquin MD [place] CARD [facility]         Care Transition Nurse reviewed discharge instructions with patient and discussed any barriers to care and/or understanding of plan of care after discharge. Discussed appropriate site of care based on symptoms and resources available to patient including: PCP  Specialist  When to call Tiki Vásquez. The patient agrees to contact the PCP office for questions related to their healthcare.        Patients top risk factors for readmission: medical

## 2023-07-13 ENCOUNTER — CARE COORDINATION (OUTPATIENT)
Dept: CASE MANAGEMENT | Age: 78
End: 2023-07-13

## 2023-07-13 NOTE — CARE COORDINATION
[facility] Care Transitions Follow Up Call    Patient Current Location: [address] [place] [address] Transition Nurse contacted the family by telephone to follow up after admission on 23. Verified name and  with family as identifiers. Patient: Cecelia Torres  Patient : 1945   MRN: [id number]  Reason for Admission: COPD exacerbation  Discharge Date: 23 RARS: Readmission Risk Score: 17.8      Needs to be reviewed by the provider   Additional needs identified to be addressed with provider: No  none             Method of communication with provider: none. Attempted to contact Stephenie Waldrop for [employer], but her  Ivania Florez answered the phone. He said that she was currently driving and could not talk. Will call pt at another time/date      Follow Up  Future Appointments   Date Time Provider [address]   2023  9:30 AM Funmilayo Colmenares MD [facility] [facility]   2023  2:00 PM Conner Austin DO [facility]   10/3/2023 10:00 AM Mehreen Bonilla   2024 10:00 AM Viktoriya Yang MD [facility] CARD [facility]            Care Transitions Subsequent and Final Call    Subsequent and Final Calls  Are you currently active with any services?: Home Health  Care Transitions Interventions  Other Interventions:             Plan for next call:  follow up on COPD  did she get her new CPAP mask, cough    Brunilda Avery RN

## 2023-07-14 ENCOUNTER — TELEPHONE (OUTPATIENT)
Dept: OTHER | Facility: CLINIC | Age: 78
End: 2023-07-14

## 2023-07-14 ENCOUNTER — HOSPITAL ENCOUNTER (EMERGENCY)
Age: 78
Discharge: HOME | End: 2023-07-14
Attending: EMERGENCY MEDICINE
Payer: MEDICARE

## 2023-07-14 ENCOUNTER — APPOINTMENT (OUTPATIENT)
Dept: CT IMAGING | Age: 78
End: 2023-07-14
Attending: EMERGENCY MEDICINE
Payer: MEDICARE

## 2023-07-14 VITALS
DIASTOLIC BLOOD PRESSURE: 116 MMHG | RESPIRATION RATE: 22 BRPM | TEMPERATURE: 98.2 F | HEART RATE: 65 BPM | OXYGEN SATURATION: 95 % | SYSTOLIC BLOOD PRESSURE: 179 MMHG

## 2023-07-14 DIAGNOSIS — R09.1 PLEURISY: Primary | ICD-10-CM

## 2023-07-14 LAB
ALBUMIN SERPL-MCNC: 4 G/DL (ref 3.5–5.2)
ALBUMIN/GLOB SERPL: 1.4 {RATIO} (ref 1–2.5)
ALP SERPL-CCNC: 75 U/L (ref 35–104)
ALT SERPL-CCNC: 12 U/L (ref 5–33)
ANION GAP SERPL CALCULATED.3IONS-SCNC: 9 MMOL/L (ref 9–17)
AST SERPL-CCNC: 15 U/L
BASOPHILS # BLD: 0.04 K/UL (ref 0–0.2)
BASOPHILS NFR BLD: 1 % (ref 0–2)
BILIRUB DIRECT SERPL-MCNC: <0.1 MG/DL
BILIRUB INDIRECT SERPL-MCNC: NORMAL MG/DL (ref 0–1)
BILIRUB SERPL-MCNC: 0.3 MG/DL (ref 0.3–1.2)
BNP SERPL-MCNC: 177 PG/ML
BUN SERPL-MCNC: 14 MG/DL (ref 8–23)
BUN/CREAT SERPL: 23 (ref 9–20)
CALCIUM SERPL-MCNC: 9.2 MG/DL (ref 8.6–10.4)
CHLORIDE SERPL-SCNC: 99 MMOL/L (ref 98–107)
CO2 SERPL-SCNC: 29 MMOL/L (ref 20–31)
CREAT SERPL-MCNC: 0.6 MG/DL (ref 0.5–0.9)
EOSINOPHIL # BLD: 0.15 K/UL (ref 0–0.44)
EOSINOPHILS RELATIVE PERCENT: 2 % (ref 1–4)
ERYTHROCYTE [DISTWIDTH] IN BLOOD BY AUTOMATED COUNT: 13.4 % (ref 11.8–14.4)
GFR SERPL CREATININE-BSD FRML MDRD: >60 ML/MIN/1.73M2
GLUCOSE SERPL-MCNC: 99 MG/DL (ref 70–99)
HCT VFR BLD AUTO: 41.8 % (ref 36.3–47.1)
HGB BLD-MCNC: 13.5 G/DL (ref 11.9–15.1)
IMM GRANULOCYTES # BLD AUTO: <0.03 K/UL (ref 0–0.3)
IMM GRANULOCYTES NFR BLD: 0 %
LYMPHOCYTES # BLD: 17 % (ref 24–43)
LYMPHOCYTES NFR BLD: 1.42 K/UL (ref 1.1–3.7)
MCH RBC QN AUTO: 31.5 PG (ref 25.2–33.5)
MCHC RBC AUTO-ENTMCNC: 32.3 G/DL (ref 28.4–34.8)
MCV RBC AUTO: 97.4 FL (ref 82.6–102.9)
MONOCYTES NFR BLD: 0.85 K/UL (ref 0.1–1.2)
MONOCYTES NFR BLD: 10 % (ref 3–12)
NEUTROPHILS NFR BLD: 70 % (ref 36–65)
NEUTS SEG NFR BLD: 5.68 K/UL (ref 1.5–8.1)
NRBC BLD-RTO: 0 PER 100 WBC
PLATELET # BLD AUTO: 257 K/UL (ref 138–453)
PMV BLD AUTO: 9.2 FL (ref 8.1–13.5)
POTASSIUM SERPL-SCNC: 4.3 MMOL/L (ref 3.7–5.3)
PROT SERPL-MCNC: 6.8 G/DL (ref 6.4–8.3)
RBC # BLD AUTO: 4.29 M/UL (ref 3.95–5.11)
SODIUM SERPL-SCNC: 137 MMOL/L (ref 135–144)
TROPONIN I SERPL HS-MCNC: 13 NG/L (ref 0–14)
WBC OTHER # BLD: 8.2 K/UL (ref 3.5–11.3)

## 2023-07-14 PROCEDURE — 80076 HEPATIC FUNCTION PANEL: CPT

## 2023-07-14 PROCEDURE — 36415 COLL VENOUS BLD VENIPUNCTURE: CPT

## 2023-07-14 PROCEDURE — 85027 COMPLETE CBC AUTOMATED: CPT

## 2023-07-14 PROCEDURE — 6360000004 HC RX CONTRAST MEDICATION: Performed by: EMERGENCY MEDICINE

## 2023-07-14 PROCEDURE — 6360000002 HC RX W HCPCS: Performed by: EMERGENCY MEDICINE

## 2023-07-14 PROCEDURE — 96375 TX/PRO/DX INJ NEW DRUG ADDON: CPT

## 2023-07-14 PROCEDURE — 96374 THER/PROPH/DIAG INJ IV PUSH: CPT

## 2023-07-14 PROCEDURE — 93005 ELECTROCARDIOGRAM TRACING: CPT | Performed by: EMERGENCY MEDICINE

## 2023-07-14 PROCEDURE — 80048 BASIC METABOLIC PNL TOTAL CA: CPT

## 2023-07-14 PROCEDURE — 84484 ASSAY OF TROPONIN QUANT: CPT

## 2023-07-14 PROCEDURE — 6370000000 HC RX 637 (ALT 250 FOR IP): Performed by: EMERGENCY MEDICINE

## 2023-07-14 PROCEDURE — 99285 EMERGENCY DEPT VISIT HI MDM: CPT

## 2023-07-14 PROCEDURE — 83880 ASSAY OF NATRIURETIC PEPTIDE: CPT

## 2023-07-14 PROCEDURE — 71260 CT THORAX DX C+: CPT

## 2023-07-14 RX ORDER — ONDANSETRON 2 MG/ML
4 INJECTION INTRAMUSCULAR; INTRAVENOUS ONCE
Status: COMPLETED | OUTPATIENT
Start: 2023-07-14 | End: 2023-07-14

## 2023-07-14 RX ORDER — OXYCODONE HYDROCHLORIDE AND ACETAMINOPHEN 5; 325 MG/1; MG/1
1 TABLET ORAL ONCE
Status: COMPLETED | OUTPATIENT
Start: 2023-07-14 | End: 2023-07-14

## 2023-07-14 RX ORDER — FENTANYL CITRATE 50 UG/ML
25 INJECTION, SOLUTION INTRAMUSCULAR; INTRAVENOUS ONCE
Status: COMPLETED | OUTPATIENT
Start: 2023-07-14 | End: 2023-07-14

## 2023-07-14 RX ORDER — LEVOFLOXACIN 500 MG/1
500 TABLET, FILM COATED ORAL DAILY
Qty: 7 TABLET | Refills: 0 | Status: SHIPPED | OUTPATIENT
Start: 2023-07-14 | End: 2023-07-21

## 2023-07-14 RX ADMIN — FENTANYL CITRATE 25 MCG: 50 INJECTION, SOLUTION INTRAMUSCULAR; INTRAVENOUS at 14:54

## 2023-07-14 RX ADMIN — IOPAMIDOL 75 ML: 755 INJECTION, SOLUTION INTRAVENOUS at 15:43

## 2023-07-14 RX ADMIN — ONDANSETRON 4 MG: 2 INJECTION INTRAMUSCULAR; INTRAVENOUS at 14:54

## 2023-07-14 RX ADMIN — OXYCODONE HYDROCHLORIDE AND ACETAMINOPHEN 1 TABLET: 5; 325 TABLET ORAL at 16:30

## 2023-07-14 ASSESSMENT — ENCOUNTER SYMPTOMS
COUGH: 0
SHORTNESS OF BREATH: 0
ABDOMINAL DISTENTION: 0
SORE THROAT: 0
BACK PAIN: 1

## 2023-07-14 ASSESSMENT — PAIN SCALES - GENERAL: PAINLEVEL_OUTOF10: 10

## 2023-07-14 ASSESSMENT — PAIN - FUNCTIONAL ASSESSMENT: PAIN_FUNCTIONAL_ASSESSMENT: 0-10

## 2023-07-14 NOTE — TELEPHONE ENCOUNTER
Writer contacted Dr. Kelley Muñoz to inform of 30 day readmission risk. George informed writer there is no decision on disposition at this time.       Call Back: If you need to call back to inform of disposition you can contact me at [phone]

## 2023-07-14 NOTE — ED PROVIDER NOTES
No signs of potential drug abuse or diversion identified.        (Please note that portions of this note were completed with a voice recognition program.  Efforts were made to edit the dictations but occasionally words are mis-transcribed.)    Jonathan Vicente DO (electronically signed)  Attending Emergency Physician            Jonathan Vicente DO  07/14/23 [id number]

## 2023-07-14 NOTE — DISCHARGE INSTRUCTIONS
Take the antibiotic as prescribed. Continue with your home pain medications. Follow-up with your doctor next week. Return if your symptoms get worse.

## 2023-07-16 LAB
EKG ATRIAL RATE: 72 BPM
EKG P AXIS: 32 DEGREES
EKG P-R INTERVAL: 176 MS
EKG Q-T INTERVAL: 414 MS
EKG QRS DURATION: 84 MS
EKG QTC CALCULATION (BAZETT): 453 MS
EKG R AXIS: -16 DEGREES
EKG T AXIS: 27 DEGREES
EKG VENTRICULAR RATE: 72 BPM

## 2023-07-16 PROCEDURE — 93010 ELECTROCARDIOGRAM REPORT: CPT | Performed by: INTERNAL MEDICINE

## 2023-07-17 ENCOUNTER — CARE COORDINATION (OUTPATIENT)
Dept: CASE MANAGEMENT | Age: 78
End: 2023-07-17

## 2023-07-21 ENCOUNTER — CARE COORDINATION (OUTPATIENT)
Dept: CASE MANAGEMENT | Age: 78
End: 2023-07-21

## 2023-07-21 NOTE — CARE COORDINATION
to their healthcare. Patients top risk factors for readmission: medical condition-COPD  Interventions to address risk factors: Obtained and reviewed discharge summary and/or continuity of care documents    Offered patient enrollment in the Remote Patient Monitoring (RPM) program for in-home monitoring: Patient is not eligible for RPM program.     Care Transitions Subsequent and Final Call    Subsequent and Final Calls  Do you have any ongoing symptoms?: Yes  Onset of Patient-reported symptoms: Other  Patient-reported symptoms: Cough, Congestion, Fatigue  Interventions for patient-reported symptoms: Other  Have your medications changed?: Yes  Patient Reports: Doxycycline 100 MG bid x 7 days, Prednisone 40 MG daily x 7 days, Tessalon 200 MG tid  Do you have any questions related to your medications?: No  Do you currently have any active services?: No  Are you currently active with any services?: Home Health  Do you have any needs or concerns that I can assist you with?: No  Identified Barriers: None  Care Transitions Interventions  Other Interventions:             Care Transition Nurse provided contact information for future needs. Plan for follow-up call in 3-5 days based on severity of symptoms and risk factors. Plan for next call: symptom management-cough, congestion, fatigue improving?  Will have CXR after atb completed    Miguel Angel Low, RN

## 2023-07-26 ENCOUNTER — CARE COORDINATION (OUTPATIENT)
Dept: CASE MANAGEMENT | Age: 78
End: 2023-07-26

## 2023-07-26 NOTE — CARE COORDINATION
[address] [facility] 250 Care Transitions Follow Up Call    Patient Current Location:  Home: [address] [place] [address]    Care Transition Nurse contacted the patient by telephone to follow up after admission on 23. Verified name and  with patient as identifiers. Patient: Leticia San  Patient : 1945   MRN: [id number]  Reason for Admission: COPD exacerbation/Pleurisy  Discharge Date: 23 RARS: Readmission Risk Score: 17.8      Needs to be reviewed by the provider   Additional needs identified to be addressed with provider: No  none             Method of communication with provider: none. Spoke to [facility] for transitions call. Stated she has a couple of days of atb and prednisone to complete, has paperwork to get CXR this week after completing meds. No longer coughing, stopped Tessalon. On supplemental 02, no increased SOB, sats in mid 90's. Appetite is good, staying hydrated. No needs or concerns at this time. Addressed changes since last contact:  medications-completing prednisone and Doxycycline, will get CXR this week  Discussed follow-up appointments. Follow Up  Future Appointments   Date Time Provider [address] [place] [address]   2023  9:30 AM Maribeth Flor MD [facility] PULM [facility]   2023  2:00 PM Diane Grant DO [facility] surg [facility]   10/3/2023 10:00 AM Rachael Mendel Clos C.D. [facility]   2024 10:00 AM Naomi Ware MD TI[facility] CARD [facility]         Care Transition Nurse reviewed discharge instructions with patient and discussed any barriers to care and/or understanding of plan of care after discharge. Discussed appropriate site of care based on symptoms and resources available to patient including: PCP  Specialist  When to call [address] The patient agrees to contact the PCP office for questions related to their healthcare.        Patients top risk factors for readmission: medical condition-COPD  Interventions to address risk factors: Obtained and reviewed

## 2023-08-01 ENCOUNTER — HOSPITAL ENCOUNTER (OUTPATIENT)
Age: 78
Discharge: HOME | DRG: 871 | End: 2023-08-03
Payer: MEDICARE

## 2023-08-01 ENCOUNTER — HOSPITAL ENCOUNTER (OUTPATIENT)
Dept: GENERAL RADIOLOGY | Age: 78
Discharge: HOME | DRG: 871 | End: 2023-08-03
Payer: MEDICARE

## 2023-08-01 DIAGNOSIS — J18.9 PNEUMONIA DUE TO INFECTIOUS ORGANISM, UNSPECIFIED LATERALITY, UNSPECIFIED PART OF LUNG: ICD-10-CM

## 2023-08-01 PROCEDURE — 71046 X-RAY EXAM CHEST 2 VIEWS: CPT

## 2023-08-02 ENCOUNTER — ANESTHESIA EVENT (OUTPATIENT)
Dept: ICU | Age: 78
DRG: 871 | End: 2023-08-02
Payer: MEDICARE

## 2023-08-02 ENCOUNTER — ANESTHESIA (OUTPATIENT)
Dept: ICU | Age: 78
DRG: 871 | End: 2023-08-02
Payer: MEDICARE

## 2023-08-02 ENCOUNTER — APPOINTMENT (OUTPATIENT)
Dept: GENERAL RADIOLOGY | Age: 78
DRG: 871 | End: 2023-08-02
Payer: MEDICARE

## 2023-08-02 ENCOUNTER — HOSPITAL ENCOUNTER (INPATIENT)
Age: 78
LOS: 2 days | Discharge: ANOTHER HOSPITAL | End: 2023-08-04
Attending: INTERNAL MEDICINE | Admitting: INTERNAL MEDICINE
Payer: MEDICARE

## 2023-08-02 ENCOUNTER — APPOINTMENT (OUTPATIENT)
Dept: CT IMAGING | Age: 78
DRG: 871 | End: 2023-08-02
Payer: MEDICARE

## 2023-08-02 ENCOUNTER — HOSPITAL ENCOUNTER (INPATIENT)
Age: 78
LOS: 1 days | Discharge: ANOTHER HOSPITAL | DRG: 871 | End: 2023-08-02
Attending: EMERGENCY MEDICINE | Admitting: INTERNAL MEDICINE
Payer: MEDICARE

## 2023-08-02 VITALS
OXYGEN SATURATION: 97 % | WEIGHT: 158.07 LBS | SYSTOLIC BLOOD PRESSURE: 104 MMHG | RESPIRATION RATE: 22 BRPM | BODY MASS INDEX: 26.3 KG/M2 | HEART RATE: 67 BPM | TEMPERATURE: 99.1 F | DIASTOLIC BLOOD PRESSURE: 46 MMHG

## 2023-08-02 DIAGNOSIS — J96.21 ACUTE ON CHRONIC RESPIRATORY FAILURE WITH HYPOXIA (HCC): ICD-10-CM

## 2023-08-02 DIAGNOSIS — J84.10 PULMONARY FIBROSIS (HCC): ICD-10-CM

## 2023-08-02 DIAGNOSIS — J44.1 COPD EXACERBATION (HCC): Primary | ICD-10-CM

## 2023-08-02 DIAGNOSIS — R09.02 HYPOXIA: ICD-10-CM

## 2023-08-02 DIAGNOSIS — J18.9 PNEUMONIA DUE TO INFECTIOUS ORGANISM, UNSPECIFIED LATERALITY, UNSPECIFIED PART OF LUNG: ICD-10-CM

## 2023-08-02 PROBLEM — J15.9 COMMUNITY ACQUIRED BACTERIAL PNEUMONIA: Status: ACTIVE | Noted: 2023-08-02

## 2023-08-02 LAB
ALBUMIN SERPL-MCNC: 3.3 G/DL (ref 3.5–5.2)
ALBUMIN SERPL-MCNC: 3.5 G/DL (ref 3.5–5.2)
ALBUMIN/GLOB SERPL: 1.3 {RATIO} (ref 1–2.5)
ALBUMIN/GLOB SERPL: 1.3 {RATIO} (ref 1–2.5)
ALLEN TEST: ABNORMAL
ALP SERPL-CCNC: 48 U/L (ref 35–104)
ALP SERPL-CCNC: 64 U/L (ref 35–104)
ALT SERPL-CCNC: 11 U/L (ref 5–33)
ALT SERPL-CCNC: 12 U/L (ref 5–33)
ANION GAP SERPL CALCULATED.3IONS-SCNC: 12 MMOL/L (ref 9–17)
ANION GAP SERPL CALCULATED.3IONS-SCNC: 8 MMOL/L (ref 9–17)
ARTERIAL PATENCY WRIST A: ABNORMAL
ARTERIAL PATENCY WRIST A: ABNORMAL
AST SERPL-CCNC: 16 U/L
AST SERPL-CCNC: 19 U/L
BACTERIA URNS QL MICRO: ABNORMAL
BASOPHILS # BLD: 0 K/UL (ref 0–0.2)
BASOPHILS NFR BLD: 0 % (ref 0–2)
BDY SITE: ABNORMAL
BILIRUB DIRECT SERPL-MCNC: 0.1 MG/DL
BILIRUB INDIRECT SERPL-MCNC: 0.2 MG/DL (ref 0–1)
BILIRUB SERPL-MCNC: 0.3 MG/DL (ref 0.3–1.2)
BILIRUB SERPL-MCNC: 0.4 MG/DL (ref 0.3–1.2)
BILIRUB UR QL STRIP: NEGATIVE
BNP SERPL-MCNC: 281 PG/ML
BODY TEMPERATURE: 37
BUN SERPL-MCNC: 17 MG/DL (ref 8–23)
BUN SERPL-MCNC: 19 MG/DL (ref 8–23)
BUN/CREAT SERPL: 24 (ref 9–20)
CALCIUM SERPL-MCNC: 7.8 MG/DL (ref 8.6–10.4)
CALCIUM SERPL-MCNC: 8.9 MG/DL (ref 8.6–10.4)
CASTS #/AREA URNS LPF: ABNORMAL /LPF
CHLORIDE SERPL-SCNC: 101 MMOL/L (ref 98–107)
CHLORIDE SERPL-SCNC: 103 MMOL/L (ref 98–107)
CLARITY UR: CLEAR
CO2 SERPL-SCNC: 23 MMOL/L (ref 20–31)
CO2 SERPL-SCNC: 32 MMOL/L (ref 20–31)
COLOR UR: YELLOW
CREAT SERPL-MCNC: 0.6 MG/DL (ref 0.5–0.9)
CREAT SERPL-MCNC: 0.8 MG/DL (ref 0.5–0.9)
EKG ATRIAL RATE: 112 BPM
EKG P AXIS: 52 DEGREES
EKG P-R INTERVAL: 168 MS
EKG Q-T INTERVAL: 338 MS
EKG QRS DURATION: 82 MS
EKG QTC CALCULATION (BAZETT): 461 MS
EKG R AXIS: -40 DEGREES
EKG T AXIS: 50 DEGREES
EKG VENTRICULAR RATE: 112 BPM
EOSINOPHIL # BLD: 0 K/UL (ref 0–0.44)
EOSINOPHILS RELATIVE PERCENT: 0 % (ref 1–4)
EPI CELLS #/AREA URNS HPF: ABNORMAL /HPF (ref 0–25)
ERYTHROCYTE [DISTWIDTH] IN BLOOD BY AUTOMATED COUNT: 13.7 % (ref 11.8–14.4)
FIO2: 40
GAS FLOW.O2 O2 DELIVERY SYS: ABNORMAL L/MIN
GFR SERPL CREATININE-BSD FRML MDRD: >60 ML/MIN/1.73M2
GFR SERPL CREATININE-BSD FRML MDRD: >60 ML/MIN/1.73M2
GLUCOSE BLD-MCNC: 197 MG/DL (ref 74–100)
GLUCOSE SERPL-MCNC: 123 MG/DL (ref 70–99)
GLUCOSE SERPL-MCNC: 237 MG/DL (ref 70–99)
GLUCOSE UR STRIP-MCNC: NEGATIVE MG/DL
HCO3 ARTERIAL: 26.9 MMOL/L (ref 22–26)
HCO3 ARTERIAL: 27.1 MMOL/L (ref 22–26)
HCO3 ARTERIAL: 28.2 MMOL/L (ref 22–26)
HCT VFR BLD AUTO: 43 % (ref 36.3–47.1)
HGB BLD-MCNC: 13.9 G/DL (ref 11.9–15.1)
HGB UR QL STRIP.AUTO: NEGATIVE
IMM GRANULOCYTES # BLD AUTO: 0 K/UL (ref 0–0.3)
IMM GRANULOCYTES NFR BLD: 0 %
KETONES UR STRIP-MCNC: NEGATIVE MG/DL
LACTATE BLDV-SCNC: 1.6 MMOL/L (ref 0.5–2.2)
LACTIC ACID, WHOLE BLOOD: 2.7 MMOL/L (ref 0.7–2.1)
LEUKOCYTE ESTERASE UR QL STRIP: NEGATIVE
LYMPHOCYTES NFR BLD: 0.35 K/UL (ref 1.1–3.7)
LYMPHOCYTES RELATIVE PERCENT: 2 % (ref 24–43)
MAGNESIUM SERPL-MCNC: 2 MG/DL (ref 1.6–2.6)
MCH RBC QN AUTO: 31.6 PG (ref 25.2–33.5)
MCHC RBC AUTO-ENTMCNC: 32.3 G/DL (ref 28.4–34.8)
MCV RBC AUTO: 97.7 FL (ref 82.6–102.9)
METER GLUCOSE: 200 MG/DL (ref 65–105)
MONOCYTES NFR BLD: 1.39 K/UL (ref 0.1–1.2)
MONOCYTES NFR BLD: 8 % (ref 3–12)
MORPHOLOGY: NORMAL
MUCOUS THREADS URNS QL MICRO: ABNORMAL
NEUTROPHILS NFR BLD: 90 % (ref 36–65)
NEUTS SEG NFR BLD: 15.66 K/UL (ref 1.5–8.1)
NITRITE UR QL STRIP: NEGATIVE
NRBC BLD-RTO: 0 PER 100 WBC
O2 DELIVERY DEVICE: ABNORMAL
O2 SAT, ARTERIAL: 83.6 % (ref 95–98)
O2 SAT, ARTERIAL: 92.2 % (ref 95–98)
O2 SAT, ARTERIAL: 93.4 % (ref 95–98)
PCO2 ARTERIAL: 47.7 MMHG (ref 35–45)
PCO2 ARTERIAL: 50.7 MMHG (ref 35–45)
PCO2 ARTERIAL: 53 MMHG (ref 35–45)
PH ARTERIAL: 7.33 (ref 7.35–7.45)
PH ARTERIAL: 7.34 (ref 7.35–7.45)
PH ARTERIAL: 7.39 (ref 7.35–7.45)
PH UR STRIP: 6 [PH] (ref 5–9)
PLATELET # BLD AUTO: 232 K/UL (ref 138–453)
PMV BLD AUTO: 9.9 FL (ref 8.1–13.5)
PO2 ARTERIAL: 48.6 MMHG (ref 80–100)
PO2 ARTERIAL: 68.4 MMHG (ref 80–100)
PO2 ARTERIAL: 71.5 MMHG (ref 80–100)
POC HCO3: 26.7 MMOL/L (ref 21–28)
POC O2 SATURATION: 97.1 % (ref 94–98)
POC PCO2: 50.9 MM HG (ref 35–48)
POC PH: 7.33 (ref 7.35–7.45)
POC PO2: 99.4 MM HG (ref 83–108)
POSITIVE BASE EXCESS, ART: 0 MMOL/L (ref 0–3)
POSITIVE BASE EXCESS, ART: 0.2 MMOL/L (ref 0–2)
POSITIVE BASE EXCESS, ART: 0.4 MMOL/L (ref 0–2)
POSITIVE BASE EXCESS, ART: 2.5 MMOL/L (ref 0–2)
POTASSIUM SERPL-SCNC: 3.3 MMOL/L (ref 3.7–5.3)
POTASSIUM SERPL-SCNC: 3.9 MMOL/L (ref 3.7–5.3)
PROCALCITONIN SERPL-MCNC: 2.16 NG/ML
PROCALCITONIN SERPL-MCNC: 2.79 NG/ML
PROT SERPL-MCNC: 5.8 G/DL (ref 6.4–8.3)
PROT SERPL-MCNC: 6.1 G/DL (ref 6.4–8.3)
PROT UR STRIP-MCNC: NEGATIVE MG/DL
PT. POSITION: ABNORMAL
RBC # BLD AUTO: 4.4 M/UL (ref 3.95–5.11)
RBC #/AREA URNS HPF: ABNORMAL /HPF (ref 0–2)
RESPIRATORY RATE: 18
SAMPLE SITE: ABNORMAL
SARS-COV-2 RDRP RESP QL NAA+PROBE: NOT DETECTED
SODIUM SERPL-SCNC: 138 MMOL/L (ref 135–144)
SODIUM SERPL-SCNC: 141 MMOL/L (ref 135–144)
SP GR UR STRIP: 1.01 (ref 1.01–1.02)
SPECIMEN DESCRIPTION: NORMAL
TROPONIN I SERPL HS-MCNC: 19 NG/L (ref 0–14)
TROPONIN I SERPL HS-MCNC: 9 NG/L (ref 0–14)
UROBILINOGEN UR STRIP-ACNC: NORMAL EU/DL (ref 0–1)
WBC #/AREA URNS HPF: ABNORMAL /HPF (ref 0–5)
WBC OTHER # BLD: 17.4 K/UL (ref 3.5–11.3)

## 2023-08-02 PROCEDURE — 03HY32Z INSERTION OF MONITORING DEVICE INTO UPPER ARTERY, PERCUTANEOUS APPROACH: ICD-10-PCS | Performed by: INTERNAL MEDICINE

## 2023-08-02 PROCEDURE — 94002 VENT MGMT INPAT INIT DAY: CPT

## 2023-08-02 PROCEDURE — 82805 BLOOD GASES W/O2 SATURATION: CPT

## 2023-08-02 PROCEDURE — 0BH17EZ INSERTION OF ENDOTRACHEAL AIRWAY INTO TRACHEA, VIA NATURAL OR ARTIFICIAL OPENING: ICD-10-PCS

## 2023-08-02 PROCEDURE — 84484 ASSAY OF TROPONIN QUANT: CPT

## 2023-08-02 PROCEDURE — 2580000003 HC RX 258: Performed by: EMERGENCY MEDICINE

## 2023-08-02 PROCEDURE — 80048 BASIC METABOLIC PNL TOTAL CA: CPT

## 2023-08-02 PROCEDURE — 0BH17EZ INSERTION OF ENDOTRACHEAL AIRWAY INTO TRACHEA, VIA NATURAL OR ARTIFICIAL OPENING: ICD-10-PCS | Performed by: INTERNAL MEDICINE

## 2023-08-02 PROCEDURE — 82803 BLOOD GASES ANY COMBINATION: CPT

## 2023-08-02 PROCEDURE — 84145 PROCALCITONIN (PCT): CPT

## 2023-08-02 PROCEDURE — 6370000000 HC RX 637 (ALT 250 FOR IP)

## 2023-08-02 PROCEDURE — 5A1935Z RESPIRATORY VENTILATION, LESS THAN 24 CONSECUTIVE HOURS: ICD-10-PCS

## 2023-08-02 PROCEDURE — 37799 UNLISTED PX VASCULAR SURGERY: CPT

## 2023-08-02 PROCEDURE — 03HY32Z INSERTION OF MONITORING DEVICE INTO UPPER ARTERY, PERCUTANEOUS APPROACH: ICD-10-PCS

## 2023-08-02 PROCEDURE — 71045 X-RAY EXAM CHEST 1 VIEW: CPT

## 2023-08-02 PROCEDURE — 85025 COMPLETE CBC W/AUTO DIFF WBC: CPT

## 2023-08-02 PROCEDURE — 87040 BLOOD CULTURE FOR BACTERIA: CPT

## 2023-08-02 PROCEDURE — 94664 DEMO&/EVAL PT USE INHALER: CPT

## 2023-08-02 PROCEDURE — 6370000000 HC RX 637 (ALT 250 FOR IP): Performed by: EMERGENCY MEDICINE

## 2023-08-02 PROCEDURE — 80053 COMPREHEN METABOLIC PANEL: CPT

## 2023-08-02 PROCEDURE — 2580000003 HC RX 258

## 2023-08-02 PROCEDURE — 82330 ASSAY OF CALCIUM: CPT

## 2023-08-02 PROCEDURE — 36415 COLL VENOUS BLD VENIPUNCTURE: CPT

## 2023-08-02 PROCEDURE — 6360000002 HC RX W HCPCS: Performed by: NURSE ANESTHETIST, CERTIFIED REGISTERED

## 2023-08-02 PROCEDURE — 2000000000 HC ICU R&B

## 2023-08-02 PROCEDURE — 94660 CPAP INITIATION&MGMT: CPT

## 2023-08-02 PROCEDURE — A4216 STERILE WATER/SALINE, 10 ML: HCPCS

## 2023-08-02 PROCEDURE — 87088 URINE BACTERIA CULTURE: CPT

## 2023-08-02 PROCEDURE — 93010 ELECTROCARDIOGRAM REPORT: CPT | Performed by: INTERNAL MEDICINE

## 2023-08-02 PROCEDURE — 2500000003 HC RX 250 WO HCPCS

## 2023-08-02 PROCEDURE — 82947 ASSAY GLUCOSE BLOOD QUANT: CPT

## 2023-08-02 PROCEDURE — 93005 ELECTROCARDIOGRAM TRACING: CPT | Performed by: EMERGENCY MEDICINE

## 2023-08-02 PROCEDURE — 87086 URINE CULTURE/COLONY COUNT: CPT

## 2023-08-02 PROCEDURE — 2500000003 HC RX 250 WO HCPCS: Performed by: NURSE PRACTITIONER

## 2023-08-02 PROCEDURE — 83605 ASSAY OF LACTIC ACID: CPT

## 2023-08-02 PROCEDURE — C9113 INJ PANTOPRAZOLE SODIUM, VIA: HCPCS

## 2023-08-02 PROCEDURE — 6360000002 HC RX W HCPCS

## 2023-08-02 PROCEDURE — 87635 SARS-COV-2 COVID-19 AMP PRB: CPT

## 2023-08-02 PROCEDURE — 4A133J1 MONITORING OF ARTERIAL PULSE, PERIPHERAL, PERCUTANEOUS APPROACH: ICD-10-PCS | Performed by: INTERNAL MEDICINE

## 2023-08-02 PROCEDURE — 2580000003 HC RX 258: Performed by: NURSE PRACTITIONER

## 2023-08-02 PROCEDURE — 6360000002 HC RX W HCPCS: Performed by: NURSE PRACTITIONER

## 2023-08-02 PROCEDURE — 31500 INSERT EMERGENCY AIRWAY: CPT | Performed by: NURSE ANESTHETIST, CERTIFIED REGISTERED

## 2023-08-02 PROCEDURE — 81001 URINALYSIS AUTO W/SCOPE: CPT

## 2023-08-02 PROCEDURE — 36620 INSERTION CATHETER ARTERY: CPT | Performed by: NURSE ANESTHETIST, CERTIFIED REGISTERED

## 2023-08-02 PROCEDURE — 87070 CULTURE OTHR SPECIMN AEROBIC: CPT

## 2023-08-02 PROCEDURE — 87899 AGENT NOS ASSAY W/OPTIC: CPT

## 2023-08-02 PROCEDURE — 94640 AIRWAY INHALATION TREATMENT: CPT

## 2023-08-02 PROCEDURE — 99285 EMERGENCY DEPT VISIT HI MDM: CPT

## 2023-08-02 PROCEDURE — 36600 WITHDRAWAL OF ARTERIAL BLOOD: CPT

## 2023-08-02 PROCEDURE — 86738 MYCOPLASMA ANTIBODY: CPT

## 2023-08-02 PROCEDURE — 0202U NFCT DS 22 TRGT SARS-COV-2: CPT

## 2023-08-02 PROCEDURE — 83880 ASSAY OF NATRIURETIC PEPTIDE: CPT

## 2023-08-02 PROCEDURE — 87449 NOS EACH ORGANISM AG IA: CPT

## 2023-08-02 PROCEDURE — 80076 HEPATIC FUNCTION PANEL: CPT

## 2023-08-02 PROCEDURE — 83735 ASSAY OF MAGNESIUM: CPT

## 2023-08-02 PROCEDURE — 96374 THER/PROPH/DIAG INJ IV PUSH: CPT

## 2023-08-02 PROCEDURE — 4A133B1 MONITORING OF ARTERIAL PRESSURE, PERIPHERAL, PERCUTANEOUS APPROACH: ICD-10-PCS | Performed by: INTERNAL MEDICINE

## 2023-08-02 PROCEDURE — 6360000002 HC RX W HCPCS: Performed by: EMERGENCY MEDICINE

## 2023-08-02 PROCEDURE — 4A133B1 MONITORING OF ARTERIAL PRESSURE, PERIPHERAL, PERCUTANEOUS APPROACH: ICD-10-PCS

## 2023-08-02 PROCEDURE — 87641 MR-STAPH DNA AMP PROBE: CPT

## 2023-08-02 PROCEDURE — 4A133J1 MONITORING OF ARTERIAL PULSE, PERIPHERAL, PERCUTANEOUS APPROACH: ICD-10-PCS

## 2023-08-02 PROCEDURE — 96365 THER/PROPH/DIAG IV INF INIT: CPT

## 2023-08-02 PROCEDURE — 87205 SMEAR GRAM STAIN: CPT

## 2023-08-02 PROCEDURE — 5A1935Z RESPIRATORY VENTILATION, LESS THAN 24 CONSECUTIVE HOURS: ICD-10-PCS | Performed by: INTERNAL MEDICINE

## 2023-08-02 RX ORDER — ONDANSETRON 2 MG/ML
4 INJECTION INTRAMUSCULAR; INTRAVENOUS EVERY 6 HOURS PRN
Status: DISCONTINUED | OUTPATIENT
Start: 2023-08-02 | End: 2023-08-02 | Stop reason: HOSPADM

## 2023-08-02 RX ORDER — FAMOTIDINE 20 MG/1
20 TABLET, FILM COATED ORAL DAILY
Status: DISCONTINUED | OUTPATIENT
Start: 2023-08-02 | End: 2023-08-02

## 2023-08-02 RX ORDER — PROPOFOL 10 MG/ML
5-50 INJECTION, EMULSION INTRAVENOUS CONTINUOUS
Status: DISCONTINUED | OUTPATIENT
Start: 2023-08-02 | End: 2023-08-04 | Stop reason: HOSPADM

## 2023-08-02 RX ORDER — 0.9 % SODIUM CHLORIDE 0.9 %
1000 INTRAVENOUS SOLUTION INTRAVENOUS ONCE
Status: COMPLETED | OUTPATIENT
Start: 2023-08-02 | End: 2023-08-02

## 2023-08-02 RX ORDER — IPRATROPIUM BROMIDE AND ALBUTEROL SULFATE 2.5; .5 MG/3ML; MG/3ML
3 SOLUTION RESPIRATORY (INHALATION)
Status: COMPLETED | OUTPATIENT
Start: 2023-08-02 | End: 2023-08-02

## 2023-08-02 RX ORDER — NOREPINEPHRINE BITARTRATE 0.06 MG/ML
1-100 INJECTION, SOLUTION INTRAVENOUS CONTINUOUS
Status: DISCONTINUED | OUTPATIENT
Start: 2023-08-02 | End: 2023-08-02 | Stop reason: HOSPADM

## 2023-08-02 RX ORDER — NOREPINEPHRINE BITARTRATE 0.06 MG/ML
1-100 INJECTION, SOLUTION INTRAVENOUS CONTINUOUS
Status: DISCONTINUED | OUTPATIENT
Start: 2023-08-02 | End: 2023-08-04 | Stop reason: HOSPADM

## 2023-08-02 RX ORDER — ENOXAPARIN SODIUM 100 MG/ML
40 INJECTION SUBCUTANEOUS DAILY
Status: DISCONTINUED | OUTPATIENT
Start: 2023-08-02 | End: 2023-08-02 | Stop reason: HOSPADM

## 2023-08-02 RX ORDER — SODIUM CHLORIDE 0.9 % (FLUSH) 0.9 %
5-40 SYRINGE (ML) INJECTION EVERY 12 HOURS SCHEDULED
Status: DISCONTINUED | OUTPATIENT
Start: 2023-08-02 | End: 2023-08-02 | Stop reason: HOSPADM

## 2023-08-02 RX ORDER — ALBUTEROL SULFATE 2.5 MG/3ML
2.5 SOLUTION RESPIRATORY (INHALATION)
Status: DISCONTINUED | OUTPATIENT
Start: 2023-08-03 | End: 2023-08-03

## 2023-08-02 RX ORDER — ACETAMINOPHEN 325 MG/1
650 TABLET ORAL EVERY 6 HOURS PRN
Status: DISCONTINUED | OUTPATIENT
Start: 2023-08-02 | End: 2023-08-02 | Stop reason: HOSPADM

## 2023-08-02 RX ORDER — DEXAMETHASONE SODIUM PHOSPHATE 4 MG/ML
4 INJECTION, SOLUTION INTRA-ARTICULAR; INTRALESIONAL; INTRAMUSCULAR; INTRAVENOUS; SOFT TISSUE EVERY 6 HOURS
Status: DISCONTINUED | OUTPATIENT
Start: 2023-08-02 | End: 2023-08-04 | Stop reason: HOSPADM

## 2023-08-02 RX ORDER — DEXTROSE MONOHYDRATE 100 MG/ML
INJECTION, SOLUTION INTRAVENOUS CONTINUOUS PRN
Status: DISCONTINUED | OUTPATIENT
Start: 2023-08-02 | End: 2023-08-04 | Stop reason: HOSPADM

## 2023-08-02 RX ORDER — ALBUTEROL SULFATE 2.5 MG/3ML
2.5 SOLUTION RESPIRATORY (INHALATION)
Status: DISCONTINUED | OUTPATIENT
Start: 2023-08-02 | End: 2023-08-04 | Stop reason: HOSPADM

## 2023-08-02 RX ORDER — FENTANYL CITRATE 50 UG/ML
INJECTION, SOLUTION INTRAMUSCULAR; INTRAVENOUS
Status: COMPLETED
Start: 2023-08-02 | End: 2023-08-02

## 2023-08-02 RX ORDER — ACETAMINOPHEN 325 MG/1
650 TABLET ORAL EVERY 6 HOURS PRN
Status: DISCONTINUED | OUTPATIENT
Start: 2023-08-02 | End: 2023-08-04 | Stop reason: HOSPADM

## 2023-08-02 RX ORDER — PROPOFOL 10 MG/ML
5-50 INJECTION, EMULSION INTRAVENOUS CONTINUOUS
Status: DISCONTINUED | OUTPATIENT
Start: 2023-08-02 | End: 2023-08-02 | Stop reason: HOSPADM

## 2023-08-02 RX ORDER — ACETAMINOPHEN 650 MG/1
650 SUPPOSITORY RECTAL EVERY 6 HOURS PRN
Status: DISCONTINUED | OUTPATIENT
Start: 2023-08-02 | End: 2023-08-04 | Stop reason: HOSPADM

## 2023-08-02 RX ORDER — 0.9 % SODIUM CHLORIDE 0.9 %
500 INTRAVENOUS SOLUTION INTRAVENOUS ONCE
Status: COMPLETED | OUTPATIENT
Start: 2023-08-02 | End: 2023-08-02

## 2023-08-02 RX ORDER — PHENYLEPHRINE HYDROCHLORIDE 10 MG/ML
INJECTION INTRAVENOUS
Status: COMPLETED
Start: 2023-08-02 | End: 2023-08-02

## 2023-08-02 RX ORDER — POLYETHYLENE GLYCOL 3350 17 G/17G
17 POWDER, FOR SOLUTION ORAL DAILY PRN
Status: DISCONTINUED | OUTPATIENT
Start: 2023-08-02 | End: 2023-08-02 | Stop reason: HOSPADM

## 2023-08-02 RX ORDER — SODIUM CHLORIDE 9 MG/ML
INJECTION, SOLUTION INTRAVENOUS PRN
Status: DISCONTINUED | OUTPATIENT
Start: 2023-08-02 | End: 2023-08-04 | Stop reason: HOSPADM

## 2023-08-02 RX ORDER — MAGNESIUM SULFATE IN WATER 40 MG/ML
2000 INJECTION, SOLUTION INTRAVENOUS PRN
Status: DISCONTINUED | OUTPATIENT
Start: 2023-08-02 | End: 2023-08-04 | Stop reason: HOSPADM

## 2023-08-02 RX ORDER — ENOXAPARIN SODIUM 100 MG/ML
40 INJECTION SUBCUTANEOUS DAILY
Status: DISCONTINUED | OUTPATIENT
Start: 2023-08-02 | End: 2023-08-03

## 2023-08-02 RX ORDER — SODIUM CHLORIDE 0.9 % (FLUSH) 0.9 %
5-40 SYRINGE (ML) INJECTION PRN
Status: DISCONTINUED | OUTPATIENT
Start: 2023-08-02 | End: 2023-08-02 | Stop reason: HOSPADM

## 2023-08-02 RX ORDER — IPRATROPIUM BROMIDE AND ALBUTEROL SULFATE 2.5; .5 MG/3ML; MG/3ML
1 SOLUTION RESPIRATORY (INHALATION)
Status: DISCONTINUED | OUTPATIENT
Start: 2023-08-02 | End: 2023-08-04 | Stop reason: HOSPADM

## 2023-08-02 RX ORDER — LIDOCAINE HYDROCHLORIDE 20 MG/ML
JELLY TOPICAL ONCE
Status: DISCONTINUED | OUTPATIENT
Start: 2023-08-02 | End: 2023-08-02 | Stop reason: HOSPADM

## 2023-08-02 RX ORDER — IPRATROPIUM BROMIDE AND ALBUTEROL SULFATE 2.5; .5 MG/3ML; MG/3ML
1 SOLUTION RESPIRATORY (INHALATION)
Status: DISCONTINUED | OUTPATIENT
Start: 2023-08-02 | End: 2023-08-02

## 2023-08-02 RX ORDER — SODIUM CHLORIDE 9 MG/ML
25 INJECTION, SOLUTION INTRAVENOUS PRN
Status: DISCONTINUED | OUTPATIENT
Start: 2023-08-02 | End: 2023-08-02 | Stop reason: HOSPADM

## 2023-08-02 RX ORDER — SODIUM CHLORIDE 9 MG/ML
INJECTION, SOLUTION INTRAVENOUS CONTINUOUS
Status: DISCONTINUED | OUTPATIENT
Start: 2023-08-02 | End: 2023-08-04 | Stop reason: HOSPADM

## 2023-08-02 RX ORDER — POTASSIUM CHLORIDE 7.45 MG/ML
10 INJECTION INTRAVENOUS PRN
Status: DISCONTINUED | OUTPATIENT
Start: 2023-08-02 | End: 2023-08-02 | Stop reason: HOSPADM

## 2023-08-02 RX ORDER — OXYMETAZOLINE HYDROCHLORIDE 0.05 G/100ML
2 SPRAY NASAL ONCE
Status: DISCONTINUED | OUTPATIENT
Start: 2023-08-02 | End: 2023-08-02 | Stop reason: HOSPADM

## 2023-08-02 RX ORDER — CHLORHEXIDINE GLUCONATE 0.12 MG/ML
15 RINSE ORAL 2 TIMES DAILY
Status: DISCONTINUED | OUTPATIENT
Start: 2023-08-02 | End: 2023-08-02 | Stop reason: HOSPADM

## 2023-08-02 RX ORDER — IPRATROPIUM BROMIDE AND ALBUTEROL SULFATE 2.5; .5 MG/3ML; MG/3ML
1 SOLUTION RESPIRATORY (INHALATION)
Status: DISCONTINUED | OUTPATIENT
Start: 2023-08-02 | End: 2023-08-02 | Stop reason: HOSPADM

## 2023-08-02 RX ORDER — METHYLPREDNISOLONE SODIUM SUCCINATE 125 MG/2ML
125 INJECTION, POWDER, LYOPHILIZED, FOR SOLUTION INTRAMUSCULAR; INTRAVENOUS ONCE
Status: COMPLETED | OUTPATIENT
Start: 2023-08-02 | End: 2023-08-02

## 2023-08-02 RX ORDER — FENTANYL CITRATE 50 UG/ML
INJECTION, SOLUTION INTRAMUSCULAR; INTRAVENOUS PRN
Status: DISCONTINUED | OUTPATIENT
Start: 2023-08-02 | End: 2023-08-02 | Stop reason: HOSPADM

## 2023-08-02 RX ORDER — INSULIN LISPRO 100 [IU]/ML
0-4 INJECTION, SOLUTION INTRAVENOUS; SUBCUTANEOUS
Status: DISCONTINUED | OUTPATIENT
Start: 2023-08-03 | End: 2023-08-04 | Stop reason: HOSPADM

## 2023-08-02 RX ORDER — LIDOCAINE HYDROCHLORIDE 10 MG/ML
5 INJECTION, SOLUTION INFILTRATION; PERINEURAL ONCE
Status: DISCONTINUED | OUTPATIENT
Start: 2023-08-02 | End: 2023-08-02 | Stop reason: HOSPADM

## 2023-08-02 RX ORDER — POTASSIUM CHLORIDE 7.45 MG/ML
10 INJECTION INTRAVENOUS PRN
Status: DISCONTINUED | OUTPATIENT
Start: 2023-08-02 | End: 2023-08-04 | Stop reason: HOSPADM

## 2023-08-02 RX ORDER — SODIUM CHLORIDE 0.9 % (FLUSH) 0.9 %
5-40 SYRINGE (ML) INJECTION PRN
Status: DISCONTINUED | OUTPATIENT
Start: 2023-08-02 | End: 2023-08-04 | Stop reason: HOSPADM

## 2023-08-02 RX ORDER — PROPOFOL 10 MG/ML
INJECTION, EMULSION INTRAVENOUS PRN
Status: DISCONTINUED | OUTPATIENT
Start: 2023-08-02 | End: 2023-08-02 | Stop reason: HOSPADM

## 2023-08-02 RX ORDER — POTASSIUM CHLORIDE 29.8 MG/ML
20 INJECTION INTRAVENOUS PRN
Status: DISCONTINUED | OUTPATIENT
Start: 2023-08-02 | End: 2023-08-02

## 2023-08-02 RX ORDER — ACETAMINOPHEN 650 MG/1
650 SUPPOSITORY RECTAL EVERY 6 HOURS PRN
Status: DISCONTINUED | OUTPATIENT
Start: 2023-08-02 | End: 2023-08-02 | Stop reason: HOSPADM

## 2023-08-02 RX ORDER — ALBUTEROL SULFATE 90 UG/1
2 AEROSOL, METERED RESPIRATORY (INHALATION)
Status: DISCONTINUED | OUTPATIENT
Start: 2023-08-02 | End: 2023-08-02

## 2023-08-02 RX ORDER — POTASSIUM CHLORIDE 29.8 MG/ML
20 INJECTION INTRAVENOUS PRN
Status: DISCONTINUED | OUTPATIENT
Start: 2023-08-02 | End: 2023-08-04 | Stop reason: HOSPADM

## 2023-08-02 RX ORDER — FENTANYL CITRATE-0.9 % NACL/PF 10 MCG/ML
25-200 PLASTIC BAG, INJECTION (ML) INTRAVENOUS CONTINUOUS
Status: DISCONTINUED | OUTPATIENT
Start: 2023-08-02 | End: 2023-08-02 | Stop reason: HOSPADM

## 2023-08-02 RX ORDER — INSULIN LISPRO 100 [IU]/ML
0-4 INJECTION, SOLUTION INTRAVENOUS; SUBCUTANEOUS NIGHTLY
Status: DISCONTINUED | OUTPATIENT
Start: 2023-08-02 | End: 2023-08-04 | Stop reason: HOSPADM

## 2023-08-02 RX ORDER — WATER 1000 ML/1000ML
INJECTION, SOLUTION INTRAVENOUS
Status: COMPLETED
Start: 2023-08-02 | End: 2023-08-02

## 2023-08-02 RX ORDER — ALBUTEROL SULFATE 2.5 MG/3ML
2.5 SOLUTION RESPIRATORY (INHALATION) EVERY 4 HOURS PRN
Status: DISCONTINUED | OUTPATIENT
Start: 2023-08-02 | End: 2023-08-02 | Stop reason: HOSPADM

## 2023-08-02 RX ORDER — FENTANYL CITRATE 50 UG/ML
50 INJECTION, SOLUTION INTRAMUSCULAR; INTRAVENOUS ONCE
Status: COMPLETED | OUTPATIENT
Start: 2023-08-02 | End: 2023-08-02

## 2023-08-02 RX ORDER — ONDANSETRON 4 MG/1
4 TABLET, ORALLY DISINTEGRATING ORAL EVERY 8 HOURS PRN
Status: DISCONTINUED | OUTPATIENT
Start: 2023-08-02 | End: 2023-08-04 | Stop reason: HOSPADM

## 2023-08-02 RX ORDER — SUCCINYLCHOLINE CHLORIDE 20 MG/ML
INJECTION INTRAMUSCULAR; INTRAVENOUS PRN
Status: DISCONTINUED | OUTPATIENT
Start: 2023-08-02 | End: 2023-08-02 | Stop reason: HOSPADM

## 2023-08-02 RX ORDER — ONDANSETRON 2 MG/ML
4 INJECTION INTRAMUSCULAR; INTRAVENOUS EVERY 6 HOURS PRN
Status: DISCONTINUED | OUTPATIENT
Start: 2023-08-02 | End: 2023-08-04 | Stop reason: HOSPADM

## 2023-08-02 RX ORDER — SODIUM CHLORIDE 9 MG/ML
INJECTION, SOLUTION INTRAVENOUS PRN
Status: DISCONTINUED | OUTPATIENT
Start: 2023-08-02 | End: 2023-08-02 | Stop reason: HOSPADM

## 2023-08-02 RX ORDER — POTASSIUM CHLORIDE 20 MEQ/1
40 TABLET, EXTENDED RELEASE ORAL ONCE
Status: DISCONTINUED | OUTPATIENT
Start: 2023-08-02 | End: 2023-08-02

## 2023-08-02 RX ORDER — POLYETHYLENE GLYCOL 3350 17 G/17G
17 POWDER, FOR SOLUTION ORAL DAILY PRN
Status: DISCONTINUED | OUTPATIENT
Start: 2023-08-02 | End: 2023-08-04 | Stop reason: HOSPADM

## 2023-08-02 RX ORDER — 0.9 % SODIUM CHLORIDE 0.9 %
1000 INTRAVENOUS SOLUTION INTRAVENOUS ONCE
Status: DISCONTINUED | OUTPATIENT
Start: 2023-08-02 | End: 2023-08-02 | Stop reason: HOSPADM

## 2023-08-02 RX ORDER — SODIUM CHLORIDE 0.9 % (FLUSH) 0.9 %
5-40 SYRINGE (ML) INJECTION EVERY 12 HOURS SCHEDULED
Status: DISCONTINUED | OUTPATIENT
Start: 2023-08-02 | End: 2023-08-04 | Stop reason: HOSPADM

## 2023-08-02 RX ORDER — DEXTROSE MONOHYDRATE, SODIUM CHLORIDE, AND POTASSIUM CHLORIDE 50; 1.49; 4.5 G/1000ML; G/1000ML; G/1000ML
INJECTION, SOLUTION INTRAVENOUS CONTINUOUS
Status: DISCONTINUED | OUTPATIENT
Start: 2023-08-02 | End: 2023-08-02 | Stop reason: HOSPADM

## 2023-08-02 RX ADMIN — SODIUM CHLORIDE 500 ML: 9 INJECTION, SOLUTION INTRAVENOUS at 11:02

## 2023-08-02 RX ADMIN — SUCCINYLCHOLINE CHLORIDE 80 MG: 20 INJECTION, SOLUTION INTRAMUSCULAR; INTRAVENOUS at 16:26

## 2023-08-02 RX ADMIN — POTASSIUM CHLORIDE, DEXTROSE MONOHYDRATE AND SODIUM CHLORIDE: 150; 5; 450 INJECTION, SOLUTION INTRAVENOUS at 14:18

## 2023-08-02 RX ADMIN — PROPOFOL 20 MCG/KG/MIN: 10 INJECTION, EMULSION INTRAVENOUS at 16:44

## 2023-08-02 RX ADMIN — WATER 2 ML: 1 INJECTION INTRAMUSCULAR; INTRAVENOUS; SUBCUTANEOUS at 09:42

## 2023-08-02 RX ADMIN — SODIUM CHLORIDE: 9 INJECTION, SOLUTION INTRAVENOUS at 21:29

## 2023-08-02 RX ADMIN — SODIUM CHLORIDE: 9 INJECTION, SOLUTION INTRAVENOUS at 20:22

## 2023-08-02 RX ADMIN — PIPERACILLIN AND TAZOBACTAM 3375 MG: 3; .375 INJECTION, POWDER, LYOPHILIZED, FOR SOLUTION INTRAVENOUS at 21:34

## 2023-08-02 RX ADMIN — VANCOMYCIN HYDROCHLORIDE 1000 MG: 1 INJECTION, POWDER, LYOPHILIZED, FOR SOLUTION INTRAVENOUS at 17:40

## 2023-08-02 RX ADMIN — Medication 5 MCG/MIN: at 17:23

## 2023-08-02 RX ADMIN — SODIUM CHLORIDE 500 ML: 9 INJECTION, SOLUTION INTRAVENOUS at 10:13

## 2023-08-02 RX ADMIN — IPRATROPIUM BROMIDE AND ALBUTEROL SULFATE 1 DOSE: .5; 3 SOLUTION RESPIRATORY (INHALATION) at 10:07

## 2023-08-02 RX ADMIN — ENOXAPARIN SODIUM 40 MG: 100 INJECTION SUBCUTANEOUS at 20:54

## 2023-08-02 RX ADMIN — SODIUM CHLORIDE 40 MG: 9 INJECTION, SOLUTION INTRAMUSCULAR; INTRAVENOUS; SUBCUTANEOUS at 20:58

## 2023-08-02 RX ADMIN — IPRATROPIUM BROMIDE AND ALBUTEROL SULFATE 3 DOSE: .5; 3 SOLUTION RESPIRATORY (INHALATION) at 09:29

## 2023-08-02 RX ADMIN — IPRATROPIUM BROMIDE AND ALBUTEROL SULFATE 1 DOSE: .5; 3 SOLUTION RESPIRATORY (INHALATION) at 09:53

## 2023-08-02 RX ADMIN — SODIUM CHLORIDE, PRESERVATIVE FREE 10 ML: 5 INJECTION INTRAVENOUS at 20:55

## 2023-08-02 RX ADMIN — PIPERACILLIN AND TAZOBACTAM 4500 MG: 4; .5 INJECTION, POWDER, LYOPHILIZED, FOR SOLUTION INTRAVENOUS at 11:07

## 2023-08-02 RX ADMIN — METHYLPREDNISOLONE SODIUM SUCCINATE 125 MG: 125 INJECTION, POWDER, LYOPHILIZED, FOR SOLUTION INTRAMUSCULAR; INTRAVENOUS at 09:42

## 2023-08-02 RX ADMIN — FENTANYL CITRATE 50 MCG: 50 INJECTION, SOLUTION INTRAMUSCULAR; INTRAVENOUS at 16:26

## 2023-08-02 RX ADMIN — FENTANYL CITRATE 50 MCG: 50 INJECTION, SOLUTION INTRAMUSCULAR; INTRAVENOUS at 16:28

## 2023-08-02 RX ADMIN — DEXAMETHASONE SODIUM PHOSPHATE 4 MG: 4 INJECTION, SOLUTION INTRAMUSCULAR; INTRAVENOUS at 20:55

## 2023-08-02 RX ADMIN — SODIUM CHLORIDE 1000 ML: 9 INJECTION, SOLUTION INTRAVENOUS at 15:54

## 2023-08-02 RX ADMIN — PHENYLEPHRINE HYDROCHLORIDE 100 MCG: 10 INJECTION INTRAVENOUS at 16:26

## 2023-08-02 RX ADMIN — PROPOFOL 100 MG: 10 INJECTION, EMULSION INTRAVENOUS at 16:26

## 2023-08-02 RX ADMIN — PROPOFOL 25 MCG/KG/MIN: 10 INJECTION, EMULSION INTRAVENOUS at 20:19

## 2023-08-02 RX ADMIN — FENTANYL CITRATE 50 MCG: 50 INJECTION, SOLUTION INTRAMUSCULAR; INTRAVENOUS at 23:20

## 2023-08-02 RX ADMIN — SODIUM CHLORIDE 1000 ML: 9 INJECTION, SOLUTION INTRAVENOUS at 21:30

## 2023-08-02 RX ADMIN — IPRATROPIUM BROMIDE AND ALBUTEROL SULFATE 1 DOSE: .5; 3 SOLUTION RESPIRATORY (INHALATION) at 21:55

## 2023-08-02 RX ADMIN — Medication 4 MCG/MIN: at 20:15

## 2023-08-02 RX ADMIN — Medication 25 MCG/HR: at 16:56

## 2023-08-02 ASSESSMENT — PULMONARY FUNCTION TESTS
PIF_VALUE: 24
PIF_VALUE: 13
PIF_VALUE: 20
PIF_VALUE: 18

## 2023-08-02 ASSESSMENT — LIFESTYLE VARIABLES: HOW OFTEN DO YOU HAVE A DRINK CONTAINING ALCOHOL: NEVER

## 2023-08-02 ASSESSMENT — PAIN SCALES - GENERAL: PAINLEVEL_OUTOF10: 0

## 2023-08-02 ASSESSMENT — PAIN - FUNCTIONAL ASSESSMENT: PAIN_FUNCTIONAL_ASSESSMENT: NONE - DENIES PAIN

## 2023-08-02 NOTE — ANESTHESIA PROCEDURE NOTES
Arterial Line:    An arterial line was placed using surface landmarks, in the ICU for the following indication(s): continuous blood pressure monitoring and blood sampling needed. A 20 gauge (size), 4.45 cm (length), Arrow and cath kit  lot [id number] exp date 2024/01/31 (type) catheter was placed, into the right radial artery and 0.5 ml 1% used from the kit, secured by suture, tape and Tegaderm and biopatch from kit in place. Anesthesia type: Local  Local infiltration: Injection    Events:  patient tolerated procedure well with no complications and EBL < 5mL. Additional notes:  Discussed with  procedure and need for arterial line, agreeable and no further questions prior to arterial line placement. After placement good waveform, positive blood return, zeroed and leveled with ICU RN.   No further needs per ICU RN.8/2/2023 2:09 PM8/2/2023 2:14 PM  Resident/CRNA: RYAN Turner CRNA  Performed: Resident/CRNA   Preanesthetic Checklist  Completed: patient identified, IV checked, site marked, risks and benefits discussed, surgical/procedural consents, equipment checked, pre-op evaluation, timeout performed, anesthesia consent given, oxygen available, monitors applied/VS acknowledged, fire risk safety assessment completed and verbalized and blood product R/B/A discussed and consented

## 2023-08-02 NOTE — DISCHARGE SUMMARY
Discharge Summary    Varsha Dao  :  1945  MRN:  [id number]    Admit date:  2023      Discharge date: 2023     Admitting Physician:  Sharron Garcia MD    Discharge Diagnoses:    Principal Problem:    Community acquired bacterial pneumonia  Active Problems:    Pulmonary fibrosis ([address])    Acute on chronic respiratory failure with hypoxia ([address])    Septic shock (HCC)    COPD (chronic obstructive pulmonary disease) ([address])  Resolved Problems:    * No resolved hospital problems. [facility] Course: Varsha Dao is a 69 y.o. female admitted with community-acquired pneumonia with septic shock and respiratory failure. She presented to the emergency room from home due to progression of difficulty breathing and shortness of breath. Patient has history of pneumonia, COPD pulmonary fibrosis. Patient does has history of requiring a ventilator x2 per .  reported patient walked in for chest x-ray yesterday per the request of her PCP however patient's symptoms progressed overnight. No fever chills were reported patient was COVID negative. Lactic acid was 1.6. WBC was 17.4 with a left shift of 15.66. Troponin was 19.  proBNP 281. Initial ABG showed a pH of 7.390, PCO2 47.7, PO2 48.6 and bicarb of 28.2. Repeat ABGs at 3 PM showed a pH of 7.327, PCO2 53.0, PO2 of 68.4 and a bicarb of 27.1. Chest x-ray showed diffuse pulmonary infiltrates concerning for pneumonia. Patient was provided nebulizer treatments, Solu-Medrol and 2 500 mL fluid boluses. Patient was placed on BiPAP and started on Zosyn as well. Upon admission to ICU patient is obtunded and opens eyes to stimulation and she does not engage in conversation. She is not moving freely except with stimulation. She is tolerating BiPAP but is hypotensive. I did have a conversation with the  who stated if she does require ventilator that he would like that to be done.   I reviewed the case with Dr. Braden Lopez and Evelina DESYREL  TAKE 2 TABLETS BY MOUTH AT NIGHT           * This list has 2 medication(s) that are the same as other medications prescribed for you. Read the directions carefully, and ask your doctor or other care provider to review them with you. Patient Instructions: Activity: bedrest  Diet:  N.p.o.   Wound Care: none needed  Other: None    Disposition:   Transfer to [facility] for tertiary care    Follow up:  Patient will be followed by Selena Armstrong MD in 1-2 weeks    CORE MEASURES on Discharge (if applicable)  ACE/ARB in CHF: NA  Statin in MI: NA  ASA in MI: NA  Statin in CVA: NA  Antiplatelet in CVA: NA    Total time spent on discharge services: 45 minutes    Including the following activities:  Evaluation and Management of patient  Discussion with patient and/or surrogate about current care plan  Coordination with Case Management and/or   Coordination of care with Consultants (if applicable)   Coordination of care with Receiving Facility Physician (if applicable)  Completion of DME forms (if applicable)  Preparation of Discharge Summary  Preparation of Medication Reconciliation  Preparation of Discharge Prescriptions    Signed:  Rojelio Gitelman, APRN - CNP, RYAN, NP-C  8/2/2023, 5:08 PM

## 2023-08-02 NOTE — PROGRESS NOTES
Writer assisted Anesthesiology with intubation. Pt intubated with a 7.5 ETT placed 23 cm at lips. Confirmed tube placement with bilateral breath sounds, good color change on colorimetry and chest xray. Pt placed on ventilator, see charting for settings. Will continue to monitor.

## 2023-08-02 NOTE — PROGRESS NOTES
[facility]  Inpatient/Observation/Outpatient Rehabilitation    Date: 2023  Patient Name: Robbie Esposito       [x] Inpatient Acute/Observation       []  Outpatient  : 1945       [] Pt no showed for scheduled appointment    [x] Pt refused/declined therapy at this time due to:      Per nursing, pt is not medically stable for therapy evaluation. Will re-attempt when pt is medically stable. [] Pt cancelled due to:  [] No Reason Given   [] Sick/ill   [] Other:    Therapist/Assistant will attempt to see this patient, at our earliest opportunity.        Vivek Nova, PT, DPT Date: 2023

## 2023-08-02 NOTE — PROGRESS NOTES
Summary of events:  Patient intubated by [place] crna. Sedation started for ventilatory compliance; fentanyl and diprivan low dose. Subsequently, patient needed bp support and low dose levophed was initiated. Multiple attempts at ngt / ogt placement were unsucessful. Several patent piv were placed. Report was given to mobile icu for transfer to [facility] icu.

## 2023-08-02 NOTE — PROGRESS NOTES
[facility]  Inpatient/Observation/Outpatient Rehabilitation    Date: 2023  Patient Name:        [x] Inpatient Acute/Observation       []  Outpatient  : 1945     [] Pt no showed for scheduled appointment    [] Pt refused/declined therapy at this time due to:           [x] Pt cancelled due to:  [] No Reason Given   [] Sick/ill   [x] Other: Per RN, patient not appropriate at this time for OT evaluation. Therapist/Assistant will attempt to see this patient, at our earliest opportunity.        Arnie Roberson, OTR/L Date: 2023

## 2023-08-02 NOTE — PROGRESS NOTES
[facility] here to take patient to [facility].    and daughter at the bedside and have been given information on how to see patient at receiving facility

## 2023-08-02 NOTE — ED NOTES
Left message for Dr. Aguilera Sheets per Dr. Paulina Darnell request.     Alisha Patel [facility]  08/02/23 1116

## 2023-08-02 NOTE — ED PROVIDER NOTES
HPI:  8/2/23,   Time: 9:27 AM EDT         Karthik Morales is a 69 y.o. female presenting to the ED for gradual onset of progressive difficulty breathing and history of pneumonia and COPD and pulmonary fibrosis, beginning over the last few days ago. The complaint has been constant, severe in severity, and worsened by light exertion. No significant alleviating factors. No fever chills night sweats complains of fatigue and no chest pain    ROS:   Pertinent positives and negatives are stated within HPI, all other systems reviewed and are negative.  --------------------------------------------- PAST HISTORY ---------------------------------------------  Past Medical History:  has a past medical history of A-fib ([address]), Anxiety, Atrial fibrillation (HCC), Biventricular congestive heart failure ([address]), Bronchiectasis with acute exacerbation ([address]), CAD (coronary artery disease), Chronic pain syndrome, COPD (chronic obstructive pulmonary disease) ([address]), Depression, GERD (gastroesophageal reflux disease), Hypothyroidism, Impaired fasting glucose, Iron deficiency anemia, Osteoporosis, Pulmonary fibrosis ([address]), and Subdural hematoma ([address]). Past Surgical History:  has a past surgical history that includes Hysterectomy (08/20/1991); Carpal tunnel release (Right, 12/17/1999); Total knee arthroplasty (Right, 03/19/2021); fracture surgery (Left, 12/20/2018); Wrist fracture surgery (Left, 12/20/2018); lumbar discectomy (08/30/1993); Lumbar disc surgery (02/15/1994); back surgery (09/09/1998); back surgery (08/04/1999); Carpal tunnel release (Left, 11/24/1999); Neck surgery (05/03/2002); Carpal tunnel release (Right, 12/30/2002); Carpal tunnel release (Left, 12/17/2003); back surgery (10/23/2003); back surgery (10/23/2003); Cervical disc surgery (10/25/2004); Cervical disc surgery (12/22/2004); Neck surgery (12/09/2005); Toe amputation (10/08/2006); Toe amputation (03/07/2008); lumbar laminectomy (06/09/2008);  Toe amputation

## 2023-08-02 NOTE — PROGRESS NOTES
Vancomycin Dosing by Pharmacy - Initial Note   TODAY'S DATE:  8/2/2023  Patient name, age: Leticia San, 69 y.o. Indication: Respiratory infection, MRSA suspected  Additional antimicrobials:  ZOSYN    Allergies:  Seasonal   Actual Weight:    Wt Readings from Last 1 Encounters:   08/02/23 158 lb 1.1 oz (71.7 kg)     Labs/Ancillary Data  Estimated Creatinine Clearance: 58 mL/min (based on SCr of 0.8 mg/dL). Recent Labs     08/02/23  0925   CREATININE 0.8   BUN 19   WBC 17.4*     Procalcitonin   Date Value Ref Range Status   06/29/2023 9.18 (H) <0.09 ng/mL Final     Comment:           Suspected Sepsis:  <0.50 ng/mL     Low likelihood of sepsis. 0.50-2.00 ng/mL     Increased likelihood of sepsis. Antibiotics encouraged. >2.00 ng/mL     High risk of sepsis/shock. Antibiotics strongly encouraged. Suspected Lower Resp Tract Infections:  <0.24 ng/mL     Low likelihood of bacterial infection. >0.24 ng/mL     Increased likelihood of bacterial infection. Antibiotics encouraged. With successful antibiotic therapy, PCT levels should decrease rapidly. (Half-life of 24 to   36 hours.)        Procalcitonin values from samples collected within the first 6 hours of systemic infection   may still be low. Retesting may be indicated. Values from day 1 and day 4 can be entered into the Change in Procalcitonin Calculator   ([email or web address]-pct-[email or web address]. [email or web address]) to determine the patient's Mortality Risk Prognosis        In healthy neonates, plasma Procalcitonin (PCT) concentrations increase gradually after   birth, reaching peak values at about 24 hours of age then decrease to normal values below   0.5 ng/mL by 48-72 hours of age. Intake/Output Summary (Last 24 hours) at 8/2/2023 1601  Last data filed at 8/2/2023 1514  Gross per 24 hour   Intake 1112.96 ml   Output --   Net 1112.96 ml     Temp: 99.1    Recent vancomycin administrations        No vancomycin IV orders with administrations found.

## 2023-08-02 NOTE — H&P
ICU Admission [facility] Medicine  History and Physical    Patient:  Kellee Carcamo  MRN: [id number]    Chief Complaint: Shortness of breath    History Obtained From:  spouse, electronic medical record, reason patient could not give history:  lack of cooperation and obtunded    PCP: Jami Fields MD    History of Present Illness: The patient is a 69 y.o. female who presented to the emergency room from home due to progression of difficulty breathing and shortness of breath. Patient has history of pneumonia, COPD pulmonary fibrosis. Patient does has history of requiring a ventilator x2 per .  reported patient walked in for chest x-ray yesterday per the request of her PCP however patient's symptoms progressed overnight. No fever chills were reported patient was COVID negative. Lactic acid was 1.6. WBC was 17.4 with a left shift of 15.66. Troponin was 19.  proBNP 281. Initial ABG showed a pH of 7.390, PCO2 47.7, PO2 48.6 and bicarb of 28.2. Repeat ABGs at 3 PM showed a pH of 7.327, PCO2 53.0, PO2 of 68.4 and a bicarb of 27.1. Chest x-ray showed diffuse pulmonary infiltrates concerning for pneumonia. Patient was provided nebulizer treatments, Solu-Medrol and 2 500 mL fluid boluses. Patient was placed on BiPAP and started on Zosyn as well. Upon admission to ICU patient is obtunded and opens eyes to stimulation and she does not engage in conversation. She is not moving freely except with stimulation. She is tolerating BiPAP but is hypotensive. I did have a conversation with the  who stated if she does require ventilator that he would like that to be done.        Past Medical History:        Diagnosis Date    A-fib [facility])     Anxiety     Atrial fibrillation (HCC)     Biventricular congestive heart failure (HCC)     Bronchiectasis with acute exacerbation ([address]) 04/29/2022    CAD (coronary artery disease)     Chronic pain syndrome     dilaudid infusion pump    COPD (chronic Lactic Acid:   Lab Results   Component Value Date    LACTA 1.6 08/02/2023     D-Dimer:  Lab Results   Component Value Date    DDIMER 0.69 (H) 01/18/2022     PT/INR:  Lab Results   Component Value Date/Time    PROTIME 13.0 10/08/2022 11:01 AM    INR 1.0 10/08/2022 11:01 AM     Troponin:  No results for input(s): TROPONINI in the last 72 hours. ABGs:   Lab Results   Component Value Date/Time    PHART 7.327 08/02/2023 03:06 PM    CJI6AAU 53.0 08/02/2023 03:06 PM    PO2ART 68.4 08/02/2023 03:06 PM    LVF7KBK 27.1 08/02/2023 03:06 PM    RNB7LMJ 35 11/02/2020 04:16 AM    P0GETSMD 92.2 08/02/2023 03:06 PM    FIO2 28 10/10/2022 05:30 AM       Imaging Data:  XR CHEST PORTABLE   Final Result   Diffuse pulmonary infiltrates concerning for pneumonia. XR CHEST PORTABLE    (Results Pending)           Assessment:    Principal Problem:    Community acquired bacterial pneumonia  Active Problems:    Pulmonary fibrosis ([address])    Acute on chronic respiratory failure with hypoxia (HCC)    COPD (chronic obstructive pulmonary disease) ([facility])  Resolved Problems:    * No resolved hospital problems.  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 ([address]) 08/23/2022    History of subdural hematoma 08/22/2022    Fall as cause of accidental injury in home as place of occurrence, initial encounter 08/22/2022    Pulmonary fibrosis ([address]) 04/29/2022    Dyspnea, unspecified 10/14/2021    GERD (gastroesophageal reflux disease) 03/02/2012    Community acquired bacterial pneumonia 08/02/2023    Acute respiratory failure with hypoxia and hypercapnia ([address]) 06/29/2023    A-fib ([address]) 02/01/2022    Anemia 10/14/2021    Biventricular congestive heart failure ([address])     COPD (chronic obstructive pulmonary disease) ([address]) 10/22/2020    Hypothyroidism 10/05/2018    Major depressive disorder 10/05/2018

## 2023-08-02 NOTE — ED NOTES
Contacted Dr. Valerie Marin for Othello Florence per Dr. Valdes Lean request.     Heather PARKS Reser  08/02/23 1140

## 2023-08-02 NOTE — PROGRESS NOTES
Patient arrived to unit, obtunded. Will only open eyes with sternal rub and then will follow very simple commands for several seconds. Patient on bipap. Course rhonchi heard t/o lung fields.     Assessment in progress

## 2023-08-02 NOTE — ANESTHESIA PROCEDURE NOTES
Airway  Date/Time: 8/2/2023 4:47 PM  Urgency: elective    Airway not difficult    General Information and Staff    Patient location during procedure: ICU  Resident/CRNA: RYAN Melton - CRNA  Performed: resident/CRNA     Indications and Patient Condition  Indications for airway management: airway protection, hypercapnia, hypoxemia, respiratory failure and CNS depression  Spontaneous ventilation: present  Sedation level: deep  Preoxygenated: yes  Patient position: sniffing  Mask difficulty assessment: not attempted    Final Airway Details  Final airway type: endotracheal airway      Successful airway: ETT  Cuffed: yes   Successful intubation technique: direct laryngoscopy  Facilitating devices/methods: intubating stylet  Endotracheal tube insertion site: oral  Blade: Linda  Blade size: #3  ETT size (mm): 7.5  Cormack-Lehane Classification: grade I - full view of glottis  Placement verified by: chest auscultation and capnometry   Placement verification comments: (Xray readying to perform CXRAY)  Measured from: lips  ETT to lips (cm): 23  Number of attempts at approach: 1  Ventilation between attempts: bag mask

## 2023-08-03 ENCOUNTER — APPOINTMENT (OUTPATIENT)
Dept: GENERAL RADIOLOGY | Age: 78
End: 2023-08-03
Attending: INTERNAL MEDICINE
Payer: MEDICARE

## 2023-08-03 LAB
ALLEN TEST: ABNORMAL
ALLEN TEST: ABNORMAL
ANION GAP SERPL CALCULATED.3IONS-SCNC: 5 MMOL/L (ref 9–17)
B PARAP IS1001 DNA NPH QL NAA+NON-PROBE: NOT DETECTED
B PERT DNA SPEC QL NAA+PROBE: NOT DETECTED
BASOPHILS # BLD: 0 K/UL (ref 0–0.2)
BASOPHILS NFR BLD: 0 % (ref 0–2)
BILIRUB UR QL STRIP: NEGATIVE
BUN SERPL-MCNC: 16 MG/DL (ref 8–23)
C PNEUM DNA NPH QL NAA+NON-PROBE: NOT DETECTED
CA-I BLD-SCNC: 1.08 MMOL/L (ref 1.13–1.33)
CALCIUM SERPL-MCNC: 7.7 MG/DL (ref 8.6–10.4)
CASTS #/AREA URNS LPF: NORMAL /LPF (ref 0–8)
CHLORIDE SERPL-SCNC: 109 MMOL/L (ref 98–107)
CLARITY UR: CLEAR
CO2 SERPL-SCNC: 26 MMOL/L (ref 20–31)
COLOR UR: YELLOW
CREAT SERPL-MCNC: 0.4 MG/DL (ref 0.5–0.9)
EOSINOPHIL # BLD: 0 K/UL (ref 0–0.4)
EOSINOPHILS RELATIVE PERCENT: 0 % (ref 1–4)
EPI CELLS #/AREA URNS HPF: NORMAL /HPF (ref 0–5)
ERYTHROCYTE [DISTWIDTH] IN BLOOD BY AUTOMATED COUNT: 14.1 % (ref 11.8–14.4)
FIO2: 30
FIO2: 30
FLUAV RNA NPH QL NAA+NON-PROBE: NOT DETECTED
FLUBV RNA NPH QL NAA+NON-PROBE: NOT DETECTED
GFR SERPL CREATININE-BSD FRML MDRD: >60 ML/MIN/1.73M2
GLUCOSE BLD-MCNC: 149 MG/DL (ref 74–100)
GLUCOSE BLD-MCNC: 162 MG/DL (ref 74–100)
GLUCOSE SERPL-MCNC: 142 MG/DL (ref 70–99)
GLUCOSE UR STRIP-MCNC: NEGATIVE MG/DL
HADV DNA NPH QL NAA+NON-PROBE: NOT DETECTED
HCOV 229E RNA NPH QL NAA+NON-PROBE: NOT DETECTED
HCOV HKU1 RNA NPH QL NAA+NON-PROBE: NOT DETECTED
HCOV NL63 RNA NPH QL NAA+NON-PROBE: NOT DETECTED
HCOV OC43 RNA NPH QL NAA+NON-PROBE: NOT DETECTED
HCT VFR BLD AUTO: 35.3 % (ref 36.3–47.1)
HGB BLD-MCNC: 11.6 G/DL (ref 11.9–15.1)
HGB UR QL STRIP.AUTO: ABNORMAL
HMPV RNA NPH QL NAA+NON-PROBE: NOT DETECTED
HPIV1 RNA NPH QL NAA+NON-PROBE: NOT DETECTED
HPIV2 RNA NPH QL NAA+NON-PROBE: NOT DETECTED
HPIV3 RNA NPH QL NAA+NON-PROBE: NOT DETECTED
HPIV4 RNA NPH QL NAA+NON-PROBE: NOT DETECTED
IMM GRANULOCYTES # BLD AUTO: 0 K/UL (ref 0–0.3)
IMM GRANULOCYTES NFR BLD: 0 %
KETONES UR STRIP-MCNC: NEGATIVE MG/DL
L PNEUMO1 AG UR QL IA.RAPID: NEGATIVE
LACTIC ACID, WHOLE BLOOD: 1 MMOL/L (ref 0.7–2.1)
LACTIC ACID, WHOLE BLOOD: 1.4 MMOL/L (ref 0.7–2.1)
LACTIC ACID, WHOLE BLOOD: 1.6 MMOL/L (ref 0.7–2.1)
LACTIC ACID, WHOLE BLOOD: 2.7 MMOL/L (ref 0.7–2.1)
LEUKOCYTE ESTERASE UR QL STRIP: NEGATIVE
LYMPHOCYTES NFR BLD: 0.74 K/UL (ref 1–4.8)
LYMPHOCYTES RELATIVE PERCENT: 4 % (ref 24–44)
M PNEUMO DNA NPH QL NAA+NON-PROBE: NOT DETECTED
MCH RBC QN AUTO: 32.1 PG (ref 25.2–33.5)
MCHC RBC AUTO-ENTMCNC: 32.9 G/DL (ref 28.4–34.8)
MCV RBC AUTO: 97.8 FL (ref 82.6–102.9)
METER GLUCOSE: 128 MG/DL (ref 65–105)
METER GLUCOSE: 138 MG/DL (ref 65–105)
METER GLUCOSE: 146 MG/DL (ref 65–105)
METER GLUCOSE: 150 MG/DL (ref 65–105)
MICROORGANISM SPEC CULT: NO GROWTH
MONOCYTES NFR BLD: 0 % (ref 1–7)
MONOCYTES NFR BLD: 0 K/UL (ref 0.1–0.8)
MORPHOLOGY: NORMAL
MRSA, DNA, NASAL: NEGATIVE
NEGATIVE BASE EXCESS, ART: 0.5 MMOL/L (ref 0–2)
NEGATIVE BASE EXCESS, ART: 0.7 MMOL/L (ref 0–2)
NEUTROPHILS NFR BLD: 96 % (ref 36–66)
NEUTS SEG NFR BLD: 17.86 K/UL (ref 1.8–7.7)
NITRITE UR QL STRIP: NEGATIVE
NRBC BLD-RTO: 0 PER 100 WBC
PH UR STRIP: >9 [PH] (ref 5–8)
PLATELET # BLD AUTO: 183 K/UL (ref 138–453)
PMV BLD AUTO: 9.9 FL (ref 8.1–13.5)
POC HCO3: 24.9 MMOL/L (ref 21–28)
POC HCO3: 25.9 MMOL/L (ref 21–28)
POC O2 SATURATION: 94.1 % (ref 94–98)
POC O2 SATURATION: 95.3 % (ref 94–98)
POC PCO2: 42.7 MM HG (ref 35–48)
POC PCO2: 49.6 MM HG (ref 35–48)
POC PH: 7.33 (ref 7.35–7.45)
POC PH: 7.37 (ref 7.35–7.45)
POC PO2: 73.2 MM HG (ref 83–108)
POC PO2: 84.4 MM HG (ref 83–108)
POTASSIUM SERPL-SCNC: 3.7 MMOL/L (ref 3.7–5.3)
PROT UR STRIP-MCNC: ABNORMAL MG/DL
RBC # BLD AUTO: 3.61 M/UL (ref 3.95–5.11)
RBC #/AREA URNS HPF: NORMAL /HPF (ref 0–4)
RSV RNA NPH QL NAA+NON-PROBE: NOT DETECTED
RV+EV RNA NPH QL NAA+NON-PROBE: NOT DETECTED
S PNEUM AG SPEC QL: NEGATIVE
SAMPLE SITE: ABNORMAL
SAMPLE SITE: ABNORMAL
SARS-COV-2 RNA NPH QL NAA+NON-PROBE: NOT DETECTED
SODIUM SERPL-SCNC: 140 MMOL/L (ref 135–144)
SP GR UR STRIP: 1.02 (ref 1–1.03)
SPECIMEN DESCRIPTION: NORMAL
SPECIMEN SOURCE: NORMAL
T4 FREE SERPL-MCNC: 0.8 NG/DL (ref 0.9–1.7)
TROPONIN I SERPL HS-MCNC: 12 NG/L (ref 0–14)
TSH SERPL DL<=0.05 MIU/L-ACNC: 1.46 UIU/ML (ref 0.3–5)
UROBILINOGEN UR STRIP-ACNC: NORMAL EU/DL (ref 0–1)
WBC #/AREA URNS HPF: NORMAL /HPF (ref 0–5)
WBC OTHER # BLD: 18.6 K/UL (ref 3.5–11.3)

## 2023-08-03 PROCEDURE — 82947 ASSAY GLUCOSE BLOOD QUANT: CPT

## 2023-08-03 PROCEDURE — 6360000002 HC RX W HCPCS

## 2023-08-03 PROCEDURE — 84443 ASSAY THYROID STIM HORMONE: CPT

## 2023-08-03 PROCEDURE — 6370000000 HC RX 637 (ALT 250 FOR IP)

## 2023-08-03 PROCEDURE — C9113 INJ PANTOPRAZOLE SODIUM, VIA: HCPCS

## 2023-08-03 PROCEDURE — 2580000003 HC RX 258

## 2023-08-03 PROCEDURE — 82803 BLOOD GASES ANY COMBINATION: CPT

## 2023-08-03 PROCEDURE — 2000000000 HC ICU R&B

## 2023-08-03 PROCEDURE — A4216 STERILE WATER/SALINE, 10 ML: HCPCS

## 2023-08-03 PROCEDURE — 94761 N-INVAS EAR/PLS OXIMETRY MLT: CPT

## 2023-08-03 PROCEDURE — 51702 INSERT TEMP BLADDER CATH: CPT

## 2023-08-03 PROCEDURE — 94003 VENT MGMT INPAT SUBQ DAY: CPT

## 2023-08-03 PROCEDURE — 71045 X-RAY EXAM CHEST 1 VIEW: CPT

## 2023-08-03 PROCEDURE — 83605 ASSAY OF LACTIC ACID: CPT

## 2023-08-03 PROCEDURE — 37799 UNLISTED PX VASCULAR SURGERY: CPT

## 2023-08-03 PROCEDURE — 6360000002 HC RX W HCPCS: Performed by: STUDENT IN AN ORGANIZED HEALTH CARE EDUCATION/TRAINING PROGRAM

## 2023-08-03 PROCEDURE — 2500000003 HC RX 250 WO HCPCS: Performed by: STUDENT IN AN ORGANIZED HEALTH CARE EDUCATION/TRAINING PROGRAM

## 2023-08-03 PROCEDURE — 85025 COMPLETE CBC W/AUTO DIFF WBC: CPT

## 2023-08-03 PROCEDURE — 2700000000 HC OXYGEN THERAPY PER DAY

## 2023-08-03 PROCEDURE — 94640 AIRWAY INHALATION TREATMENT: CPT

## 2023-08-03 PROCEDURE — 81001 URINALYSIS AUTO W/SCOPE: CPT

## 2023-08-03 PROCEDURE — 84439 ASSAY OF FREE THYROXINE: CPT

## 2023-08-03 PROCEDURE — 99291 CRITICAL CARE FIRST HOUR: CPT | Performed by: INTERNAL MEDICINE

## 2023-08-03 PROCEDURE — 80048 BASIC METABOLIC PNL TOTAL CA: CPT

## 2023-08-03 RX ORDER — MIDAZOLAM HYDROCHLORIDE 2 MG/2ML
4 INJECTION, SOLUTION INTRAMUSCULAR; INTRAVENOUS ONCE
Status: COMPLETED | OUTPATIENT
Start: 2023-08-03 | End: 2023-08-03

## 2023-08-03 RX ORDER — ALBUTEROL SULFATE 2.5 MG/3ML
2.5 SOLUTION RESPIRATORY (INHALATION)
Status: DISCONTINUED | OUTPATIENT
Start: 2023-08-04 | End: 2023-08-04 | Stop reason: HOSPADM

## 2023-08-03 RX ORDER — ENOXAPARIN SODIUM 100 MG/ML
1 INJECTION SUBCUTANEOUS 2 TIMES DAILY
Status: DISCONTINUED | OUTPATIENT
Start: 2023-08-03 | End: 2023-08-04 | Stop reason: HOSPADM

## 2023-08-03 RX ORDER — MIDAZOLAM HYDROCHLORIDE 1 MG/ML
INJECTION INTRAMUSCULAR; INTRAVENOUS
Status: COMPLETED
Start: 2023-08-03 | End: 2023-08-03

## 2023-08-03 RX ORDER — ENOXAPARIN SODIUM 100 MG/ML
30 INJECTION SUBCUTANEOUS ONCE
Status: COMPLETED | OUTPATIENT
Start: 2023-08-03 | End: 2023-08-03

## 2023-08-03 RX ORDER — MIDAZOLAM HYDROCHLORIDE 1 MG/ML
1-10 INJECTION, SOLUTION INTRAVENOUS CONTINUOUS
Status: DISCONTINUED | OUTPATIENT
Start: 2023-08-03 | End: 2023-08-04 | Stop reason: HOSPADM

## 2023-08-03 RX ORDER — ENOXAPARIN SODIUM 100 MG/ML
1 INJECTION SUBCUTANEOUS 2 TIMES DAILY
Status: DISCONTINUED | OUTPATIENT
Start: 2023-08-03 | End: 2023-08-03

## 2023-08-03 RX ADMIN — IPRATROPIUM BROMIDE AND ALBUTEROL SULFATE 1 DOSE: .5; 3 SOLUTION RESPIRATORY (INHALATION) at 08:53

## 2023-08-03 RX ADMIN — ENOXAPARIN SODIUM 40 MG: 100 INJECTION SUBCUTANEOUS at 08:07

## 2023-08-03 RX ADMIN — SODIUM CHLORIDE: 9 INJECTION, SOLUTION INTRAVENOUS at 16:55

## 2023-08-03 RX ADMIN — SODIUM CHLORIDE: 9 INJECTION, SOLUTION INTRAVENOUS at 07:35

## 2023-08-03 RX ADMIN — MIDAZOLAM HYDROCHLORIDE 4 MG: 1 INJECTION, SOLUTION INTRAMUSCULAR; INTRAVENOUS at 02:32

## 2023-08-03 RX ADMIN — ENOXAPARIN SODIUM 30 MG: 30 INJECTION SUBCUTANEOUS at 10:58

## 2023-08-03 RX ADMIN — Medication 50 MCG/HR: at 02:07

## 2023-08-03 RX ADMIN — IPRATROPIUM BROMIDE AND ALBUTEROL SULFATE 1 DOSE: .5; 3 SOLUTION RESPIRATORY (INHALATION) at 19:53

## 2023-08-03 RX ADMIN — DEXAMETHASONE SODIUM PHOSPHATE 4 MG: 4 INJECTION, SOLUTION INTRAMUSCULAR; INTRAVENOUS at 20:35

## 2023-08-03 RX ADMIN — IPRATROPIUM BROMIDE AND ALBUTEROL SULFATE 1 DOSE: .5; 3 SOLUTION RESPIRATORY (INHALATION) at 16:16

## 2023-08-03 RX ADMIN — Medication 100 MCG/HR: at 19:52

## 2023-08-03 RX ADMIN — PIPERACILLIN AND TAZOBACTAM 3375 MG: 3; .375 INJECTION, POWDER, LYOPHILIZED, FOR SOLUTION INTRAVENOUS at 04:48

## 2023-08-03 RX ADMIN — PIPERACILLIN AND TAZOBACTAM 3375 MG: 3; .375 INJECTION, POWDER, LYOPHILIZED, FOR SOLUTION INTRAVENOUS at 20:05

## 2023-08-03 RX ADMIN — Medication 2 MG/HR: at 05:14

## 2023-08-03 RX ADMIN — DEXAMETHASONE SODIUM PHOSPHATE 4 MG: 4 INJECTION, SOLUTION INTRAMUSCULAR; INTRAVENOUS at 15:11

## 2023-08-03 RX ADMIN — SODIUM CHLORIDE 40 MG: 9 INJECTION, SOLUTION INTRAMUSCULAR; INTRAVENOUS; SUBCUTANEOUS at 08:08

## 2023-08-03 RX ADMIN — IPRATROPIUM BROMIDE AND ALBUTEROL SULFATE 1 DOSE: .5; 3 SOLUTION RESPIRATORY (INHALATION) at 12:01

## 2023-08-03 RX ADMIN — MIDAZOLAM HYDROCHLORIDE 4 MG: 2 INJECTION, SOLUTION INTRAMUSCULAR; INTRAVENOUS at 02:32

## 2023-08-03 RX ADMIN — DEXAMETHASONE SODIUM PHOSPHATE 4 MG: 4 INJECTION, SOLUTION INTRAMUSCULAR; INTRAVENOUS at 08:08

## 2023-08-03 RX ADMIN — PIPERACILLIN AND TAZOBACTAM 3375 MG: 3; .375 INJECTION, POWDER, LYOPHILIZED, FOR SOLUTION INTRAVENOUS at 12:59

## 2023-08-03 RX ADMIN — VANCOMYCIN HYDROCHLORIDE 1500 MG: 1.5 INJECTION, POWDER, LYOPHILIZED, FOR SOLUTION INTRAVENOUS at 08:04

## 2023-08-03 RX ADMIN — ENOXAPARIN SODIUM 70 MG: 80 INJECTION SUBCUTANEOUS at 21:34

## 2023-08-03 RX ADMIN — SODIUM CHLORIDE, PRESERVATIVE FREE 10 ML: 5 INJECTION INTRAVENOUS at 19:48

## 2023-08-03 RX ADMIN — DEXAMETHASONE SODIUM PHOSPHATE 4 MG: 4 INJECTION, SOLUTION INTRAMUSCULAR; INTRAVENOUS at 03:19

## 2023-08-03 ASSESSMENT — PULMONARY FUNCTION TESTS
PIF_VALUE: 22
PIF_VALUE: 20
PIF_VALUE: 20
PIF_VALUE: 21
PIF_VALUE: 22
PIF_VALUE: 23

## 2023-08-03 NOTE — PLAN OF CARE
Problem: Chronic Conditions and Co-morbidities  Goal: Patient's chronic conditions and co-morbidity symptoms are monitored and maintained or improved  8/3/2023 1134 by Tenisha Restrepo RN  Outcome: Progressing  8/3/2023 0621 by Jeremiah Ceja RN  Outcome: Progressing     Problem: Discharge Planning  Goal: Discharge to home or other facility with appropriate resources  8/3/2023 1134 by Tenisha Restrepo RN  Outcome: Progressing  8/3/2023 0621 by Jeremiah Ceja RN  Outcome: Progressing     Problem: Safety - Adult  Goal: Free from fall injury  8/3/2023 1134 by Tenisha Restrepo RN  Outcome: Progressing  8/3/2023 0621 by Jeremiah Ceja RN  Outcome: Progressing     Problem: Safety - Medical Restraint  Goal: Remains free of injury from restraints (Restraint for Interference with Medical Device)  Description: INTERVENTIONS:  1. Determine that other, less restrictive measures have been tried or would not be effective before applying the restraint  2. Evaluate the patient's condition at the time of restraint application  3. Inform patient/family regarding the reason for restraint  4.  Q2H: Monitor safety, psychosocial status, comfort, nutrition and hydration  8/3/2023 1134 by Tenisha Restrepo RN  Outcome: Progressing  8/3/2023 0621 by Jeremiah Ceja RN  Outcome: Progressing  Flowsheets  Taken 8/3/2023 0600  Remains free of injury from restraints (restraint for interference with medical device): Every 2 hours: Monitor safety, psychosocial status, comfort, nutrition and hydration  Taken 8/3/2023 0400  Remains free of injury from restraints (restraint for interference with medical device): Every 2 hours: Monitor safety, psychosocial status, comfort, nutrition and hydration  Taken 8/3/2023 0200  Remains free of injury from restraints (restraint for interference with medical device): Every 2 hours: Monitor safety, psychosocial status, comfort, nutrition and hydration  Taken 8/3/2023 0000  Remains free of injury from restraints (restraint for interference with medical device): Every 2 hours: Monitor safety, psychosocial status, comfort, nutrition and hydration  Taken 8/2/2023 2200  Remains free of injury from restraints (restraint for interference with medical device): Every 2 hours: Monitor safety, psychosocial status, comfort, nutrition and hydration  Taken 8/2/2023 2117  Remains free of injury from restraints (restraint for interference with medical device): Every 2 hours: Monitor safety, psychosocial status, comfort, nutrition and hydration     Problem: Pain  Goal: Verbalizes/displays adequate comfort level or baseline comfort level  8/3/2023 1134 by Bernice Griffin RN  Outcome: Progressing  8/3/2023 0621 by Janessa Rosado RN  Outcome: Progressing  Flowsheets  Taken 8/3/2023 0400  Verbalizes/displays adequate comfort level or baseline comfort level: Assess pain using appropriate pain scale  Taken 8/3/2023 0000  Verbalizes/displays adequate comfort level or baseline comfort level: Assess pain using appropriate pain scale  Taken 8/2/2023 1935  Verbalizes/displays adequate comfort level or baseline comfort level: Assess pain using appropriate pain scale     Problem: Respiratory - Adult  Goal: Achieves optimal ventilation and oxygenation  8/3/2023 1134 by Bernice Griffin RN  Outcome: Progressing  8/3/2023 0900 by Farzana Roth RCP  Outcome: Progressing

## 2023-08-03 NOTE — PROGRESS NOTES
Numerous nursing staff attempted to get OG/NG on pt and have been unsuccessful. Critical care resident Dr. nAtione Gonzales at bedside with glidescope to attempt. Verbal order for 100cgm fentanyl bolus off pump. Channel error while infusing bolus, additional 75 mcg fentanyl bolus given once channel fixed.

## 2023-08-03 NOTE — CONSULTS
Department of Neurosurgery                                            Nurse Practitioner Consult Note      Reason for Consult:  pain pump refill   Requesting Physician:  Massiel Marrero   Neurosurgeon:   [x] Dr. Philip Crespo  [] Dr. Atilio Srivastava  [] Dr. Ronal Benítez  [] Dr. Germaine Carrasquillo      History Obtained From:  [facility] record    CHIEF COMPLAINT:         Respiratory distress       HISTORY OF PRESENT ILLNESS:       The patient is a 69 y.o. female who presents with has PMH pulmonary fibrosis, GERD, A fib, CHF, COPD. She was admitted with shortness of breath over last 2 days went to [facility] ED and CXR completed showing diffuse pulmonary infiltrates concerning for pneumonia . She developed respiratory distress and was intubated and is on versed 3 mg and fentanyl 75 mcg infusion and was also started on Zosyn. Neurosurgery was consulted as patient has a pain pump which is due to need refill either today or tomorrow. She does get pain pump refilled at [facility] and multiple sources have been reached regarding an attempt to refill the pump.        Past Medical History:        Diagnosis Date    A-fib [facility])     Anxiety     Atrial fibrillation (HCC)     Biventricular congestive heart failure (HCC)     Bronchiectasis with acute exacerbation ([address]) 04/29/2022    CAD (coronary artery disease)     Chronic pain syndrome     dilaudid infusion pump    COPD (chronic obstructive pulmonary disease) (HCC)     Depression     GERD (gastroesophageal reflux disease)     Hypothyroidism     Impaired fasting glucose     Iron deficiency anemia     Osteoporosis     Pulmonary fibrosis ([address]) 04/29/2022    Subdural hematoma ([address]) 08/23/2022       Past Surgical History:        Procedure Laterality Date    BACK SURGERY  09/09/1998    spinal cord stimulator    BACK SURGERY  08/04/1999    infusion pump insertion    BACK SURGERY  10/23/2003    spinal cord stimulator removed    BACK SURGERY  10/23/2003    new infusion pump placed    BACK SURGERY

## 2023-08-03 NOTE — PROGRESS NOTES
Patient admitted on Mechanical Ventilator Protocol. Patients height measured at 58 for an IBW 40.9    Patient placed on the ventilator on settings as charted on flowsheeet. Ventilator Bronchodilator assessment    Breath sounds: clear/ diminished  Inspiratory Pressure: 20  Plateau Pressure: 18    Patient assessed at level 1          [x]    Bronchodilator Assessment    BRONCHODILATOR ASSESSMENT SCORE  Score 0 (Home) 1 2 3 4   Breath Sounds   []  Chronic Ventilator: Patient at baseline [x]  Mild Wheezes/ Clear []  Intermittent wheezes with good air entry []  Bilateral/unilateral wheezing with diminished air entry []  Insp/Exp wheeze and/or poor aeration   Ventilator Pressures   []  Chronic Ventilator [x]  Insp. Pressure less than 25 cm H20 []  Insp. Pressure less than 25 cm H20 []  Insp. Pressure exceeds 25 cm H20 []  Insp.  Pressure exceeds 30 cm H20   Plateau Pressure []  NA   [x]  Plateau Pressure less than 4  []  Plateau Pressure less than or equal to 5 []  Plateau Pressure greater than or equal to 6 []  Plateau Pressure greater than or equal to 8       Courtney NICOLE Gallegos  9:50 PM

## 2023-08-03 NOTE — DISCHARGE SUMMARY
as needed for Wheezing  Qty: 18 g, Refills: 0      ipratropium-albuterol (DUONEB) 0.5-2.5 (3) MG/3ML SOLN nebulizer solution Take 3 mLs by nebulization every 4 hours as needed for Shortness of Breath or Wheezing  Qty: 360 mL, Refills: 3      montelukast (SINGULAIR) 10 MG tablet Take 1 tablet by mouth nightly  Qty: 30 tablet, Refills: 3      guaiFENesin (MUCINEX) 600 MG extended release tablet Take 1 tablet by mouth 2 times daily  Qty: 30 tablet, Refills: 3      NONFORMULARY Pt on pump with Bupivacaine 6.178mg/day, Hydromorphone 3.089mg/day      ASPIRIN 81 PO Take 81 mg by mouth daily      HYDROcodone-acetaminophen (NORCO)  MG per tablet take 1 tablet by mouth every 8 hours if needed for pain for up to 30 DAYS      pregabalin (LYRICA) 300 MG capsule take 1 capsule by mouth twice a day      rivaroxaban (XARELTO) 20 MG TABS tablet Take 1 tablet by mouth daily (with breakfast)  Qty: 90 tablet, Refills: 3      ondansetron (ZOFRAN-ODT) 4 MG disintegrating tablet Take 1 tablet by mouth every 8 hours as needed for Nausea or Vomiting      calcium citrate-vitamin D (CITRICAL + D) 315-250 MG-UNIT TABS per tablet Take 1 tablet by mouth 2 times daily (with meals)      alendronate (FOSAMAX) 70 MG tablet Take 1 tablet by mouth every 7 days On Sundays  Qty: 12 tablet, Refills: 3      umeclidinium-vilanterol (ANORO ELLIPTA) 62.5-25 MCG/INH AEPB inhaler Inhale 1 puff into the lungs daily  Qty: 3 each, Refills: 3           STOP taking these medications       hydroxychloroquine (PLAQUENIL) 200 MG tablet Comments:   Reason for Stopping:             Activity: As ordered by [facility] [employer] [facility] clinic ICU    Diet: N.p.o.    Disposition: [facility] ICU    Electronically signed by Mike Lane MD on 8/3/2023 at 4:40 PM     Time Spent on discharge is more than 15 minutes in the examination, evaluation, counseling and review of medications and discharge plan.       Mike Lane MD  Emergency Medicine Resident  Critical Care Service

## 2023-08-03 NOTE — PROGRESS NOTES
Lab Results   Component Value Date/Time    PHOS 3.3 08/25/2022 02:38 PM    PHOS 1.5 08/25/2022 04:52 AM    PHOS 2.5 08/24/2022 05:06 AM     Ionized Calcium:   Lab Results   Component Value Date/Time    CAION 1.08 08/02/2023 11:35 PM    CAION 1.14 08/26/2022 02:40 AM    CAION 1.14 08/25/2022 04:52 AM        Urinalysis:   Lab Results   Component Value Date/Time    NITRU NEGATIVE 08/02/2023 01:38 PM    COLORU Yellow 08/02/2023 01:38 PM    PHUR 6.0 08/02/2023 01:38 PM    WBCUA 0 TO 2 08/02/2023 01:38 PM    RBCUA 0 TO 2 08/02/2023 01:38 PM    MUCUS TRACE 08/02/2023 01:38 PM    TRICHOMONAS NOT REPORTED 10/14/2021 08:07 PM    YEAST NOT REPORTED 10/14/2021 08:07 PM    BACTERIA 1+ 08/02/2023 01:38 PM    SPECGRAV 1.010 08/02/2023 01:38 PM    LEUKOCYTESUR NEGATIVE 08/02/2023 01:38 PM    UROBILINOGEN Normal 08/02/2023 01:38 PM    BILIRUBINUR NEGATIVE 08/02/2023 01:38 PM    GLUCOSEU NEGATIVE 08/02/2023 01:38 PM    KETUA NEGATIVE 08/02/2023 01:38 PM    AMORPHOUS 2+ 09/09/2022 09:29 PM       HgBA1c:    Lab Results   Component Value Date/Time    LABA1C 5.4 06/27/2023 03:04 PM     TSH:    Lab Results   Component Value Date/Time    TSH 1.45 06/29/2023 07:40 PM     Lactic Acid:   Lab Results   Component Value Date/Time    LACTA 1.6 08/02/2023 09:25 AM    LACTA 1.9 06/29/2023 07:40 PM    LACTA 0.9 02/23/2023 05:46 AM      Troponin: No results for input(s): TROPONINI in the last 72 hours.         ASSESSMENT:     Patient Active Problem List    Diagnosis Date Noted    Oxygen dependent 02/22/2023    Acute on chronic respiratory failure with hypoxia ([address]) 02/22/2023    Midline shift of brain due to hematoma ([address]) 08/23/2022    History of subdural hematoma 08/22/2022    Fall as cause of accidental injury in home as place of occurrence, initial encounter 08/22/2022    Pulmonary fibrosis ([address]) 04/29/2022    Dyspnea, unspecified 10/14/2021    GERD (gastroesophageal reflux disease) 03/02/2012    Community acquired bacterial pneumonia 08/02/2023 COPD exacerbation ([address]) 08/02/2023    Acute respiratory failure with hypoxia and hypercapnia ([address]) 06/29/2023    A-fib ([address]) 02/01/2022    Anemia 10/14/2021    Biventricular congestive heart failure (HCC)     COPD (chronic obstructive pulmonary disease) ([address]) 10/22/2020    Septic shock ([address]) 08/16/2020    Hypothyroidism 10/05/2018    Major depressive disorder 10/05/2018    Chronic midline low back pain without sciatica 10/05/2018    Iron deficiency anemia 10/05/2018          PLAN:       Problem:  Acute respiratory failure with hypoxia secondary to pneumonia  Patient is intubated, receiving fentanyl and Versed drips. ETT was pulled back by 2 cm following recommendation from last chest x-ray  On vancomycin and Zosyn  On Decadron  Procalcitonin elevated at 2.79  Respiratory panel was negative, COVID-19 was negative, Legionella was negative, strep pneumo urine was negative  Septic shock  Patient is no longer receiving Levophed  On fentanyl drip  Hemodynamically stable  Intermittently bradycardic in the 40s to 50s, stopped the propofol with no improvement. Ordered TSH studies to evaluate her hypothyroidism. Lactic acid elevated at 2.7  Preliminary blood cultures no growth for 20 hours x 2  Nutrition  Nursing staff and night resident had difficult time placing OG/NG, even with the help of kaleidoscope. If she remains intubated, she will likely need 1 of these placed. They can reattempt at Transfer clinic and/or consult interventional radiology for assistance.   Intrathecal pain pump  Needs to be refilled today or tomorrow to prevent degradation  Hospital prophylaxis  Lovenox for thrombo-prophylaxis, using a therapeutic dose to cover for the anticoagulants she is normally on at home  PPI for ulcer prophylaxis      Arturo Lentz MD  Emergency Medicine Resident, PGY-1  7:53 AM 08/03/23

## 2023-08-03 NOTE — PLAN OF CARE
Problem: Chronic Conditions and Co-morbidities  Goal: Patient's chronic conditions and co-morbidity symptoms are monitored and maintained or improved  Outcome: Progressing     Problem: Discharge Planning  Goal: Discharge to home or other facility with appropriate resources  Outcome: Progressing     Problem: Safety - Adult  Goal: Free from fall injury  Outcome: Progressing     Problem: Safety - Medical Restraint  Goal: Remains free of injury from restraints (Restraint for Interference with Medical Device)  Description: INTERVENTIONS:  1. Determine that other, less restrictive measures have been tried or would not be effective before applying the restraint  2. Evaluate the patient's condition at the time of restraint application  3. Inform patient/family regarding the reason for restraint  4.  Q2H: Monitor safety, psychosocial status, comfort, nutrition and hydration  Outcome: Progressing  Flowsheets  Taken 8/3/2023 0600  Remains free of injury from restraints (restraint for interference with medical device): Every 2 hours: Monitor safety, psychosocial status, comfort, nutrition and hydration  Taken 8/3/2023 0400  Remains free of injury from restraints (restraint for interference with medical device): Every 2 hours: Monitor safety, psychosocial status, comfort, nutrition and hydration  Taken 8/3/2023 0200  Remains free of injury from restraints (restraint for interference with medical device): Every 2 hours: Monitor safety, psychosocial status, comfort, nutrition and hydration  Taken 8/3/2023 0000  Remains free of injury from restraints (restraint for interference with medical device): Every 2 hours: Monitor safety, psychosocial status, comfort, nutrition and hydration  Taken 8/2/2023 2200  Remains free of injury from restraints (restraint for interference with medical device): Every 2 hours: Monitor safety, psychosocial status, comfort, nutrition and hydration  Taken 8/2/2023 2117  Remains free of injury from

## 2023-08-03 NOTE — CARE COORDINATION
08/03/23 0819   Readmission Assessment   Number of Days since last admission? 1-7 days  (Not a RAC, Transfer from [facility])   Previous Disposition Other (comment)  (Not a RAC, Transfer from [facility])   Who is being Interviewed Patient  (Not a RAC, Transfer from [facility])   What was the patient's/caregiver's perception as to why they think they needed to return back to the hospital? Other (Comment)  (Not a RAC, Transfer from [facility])   Did you visit your Primary Care Physician after you left the hospital, before you returned this time? No  (Not a RAC, Transfer from [facility])   Why weren't you able to visit your PCP? Other (Comment)  (Not a RAC, Transfer from [facility])   Did you see a specialist, such as Cardiac, Pulmonary, Orthopedic Physician, etc. after you left the hospital? Other (Comment)  (Not a RAC, Transfer from [facility])   Who advised the patient to return to the hospital? Other (Comment)  (Not a RAC, Transfer from [facility])   Does the patient report anything that got in the way of taking their medications? No  (Not a RAC, Transfer from [facility])   In our efforts to provide the best possible care to you and others like you, can you think of anything that we could have done to help you after you left the hospital the first time, so that you might not have needed to return so soon?  Other (Comment)  (Not a RAC, Transfer from [facility])

## 2023-08-03 NOTE — FLOWSHEET NOTE
Call received from Vero Frias [facility]) regarding patient's pain pump. States that pump is due to be refilled tomorrow, and requests that we refill pump (if able). If the pump is not refilled, it will unfortunately affect the integrity of the pump - resulting in the patient needing to undergo another surgery to have it replaced. Vero Frias states that there is also risk for patient to go through withdrawals if not refilled. [facility]: [phone]    Pump information:  [employer] 20ml pump - hydromorphone & bupivacaine     -  Anesthesia contacted - per Dr Christy Marie, unable to fill pump.   -  Dr. Timoteo Mcmanus contacted - does not refill pumps, only has placed on rare occasion.  -  Dr. Osmar Alva [facility]) contacted - states unable to refill pump; partner in [place] does not have experience with pain pump refills. Suggested reaching out to [facility] to see if they have an outpatient rep that could come fill it (such as one used for home-care patients).

## 2023-08-03 NOTE — PROGRESS NOTES
There is significant concern regarding the patient's intrathecal pain pump that is in place. It is due to run out of medication today or tomorrow. This is normally managed by the [facility] pain management, and refilled every other month. Once the pump runs out of medication, it begins to lose its integrity, which would require the pump to be replaced via surgery. As the patient is being treated here in the ICU for respiratory distress and pneumonia, it was attempted to see if we could fill her pain pump here. The anesthesiologist at this hospital do not have the capabilities to fill it as they have never done it before and do not have the associated supplies. Neurosurgery here has placed these pumps before but do not know how to fill them. [address] pain clinic was contacted and they are also unable to fill the patient's pain pump. [facility] and they are unable to fill the patient's pain pump. Outpatient clinic on [facility] was contacted and they are unable to fill the patient's pain pump. Currently attempting to contact [facility] to see if they have pain management specialist that are able to do this. Initiated transfer with Transfer clinic in an attempt to have patient's pain pump managed at that facility while she continues to receive treatment in the ICU. She was accepted, however the likelihood of receiving an ICU bed prior to tomorrow is slim. Continuing to explore options at this time with the help of [facility] access and Transfer clinic.

## 2023-08-03 NOTE — CARE COORDINATION
Transitional planning. 1200 informed pt has pain pump that needs to be filled, if it empties completely, it will lose it's integrity and pt will have to have surgery to have another one implanted. Provided [facility] Access number to Dr. Griselda Skill      [phone] informed that pt may be able to have pump filled with mobile pump co, called Jocelynn Yun [phone], LM asking for CB. Will get information on what needs to be completed for [employer] to see pt when she returns call. [address] [phone] returned call, she provided information for [facility] refill Co. [facility] contact to see if pt qualifies for their services.  Arvind Godfrey [phone], email: [email or web address]  [facility] with [facility], they will need orders. [employer] Rep Sven Max will see pt and can help turn down the pump- will still need physician orders. Enio Hernandez called, they will need a contract with [address] [place] [address], order for medication, referral sheet, pump print out (telemetry report)  Provided her w/ email so she can send forms needed. Email sent states we need:  Referral Sheet  Demographic/Insurance  Telemetry (Pump Printout)  Last Refill Note  Order for medication     After Faxing the information to us [phone]- wait 10 mins, then call my team to confirm they have it and are working on it Ph# [phone]. VERY important to call and confirm on any time-sensitive refill request.    She stated Pump may be able to be slowed down to prolong medication supply. Once they have orders and referral sheet, [facility] has a facility agreement team who will take over and call administration to obtain a contract and access agreement. Informed pt's RN, Shahab Myrick of above information    Sven Max with [employer] provided pump information stating pt has 5 days remaining. Information emailed to CM, printed and placed in pt chart. Dr. Griselda Skill informed CM that pt has a bed at [facility] ICU bed 6217, room is being cleared.

## 2023-08-03 NOTE — CARE COORDINATION
comment), Bathing, Dressing, Toileting (Intubated/ Sedated)    PT AM-PAC:   /24  OT AM-PAC:   /24    Family can provide assistance at DC: Other (comment) (dependent on HC needs)  Would you like Case Management to discuss the discharge plan with any other family members/significant others, and if so, who? Plans to Return to Present Housing: Unknown at present  Other Identified Issues/Barriers to RETURNING to current housing: dependent on pt progress, Monitor for needs  Potential Assistance needed at discharge: Transportation            Potential DME:    Patient expects to discharge to: Other (comment) (Transfer to [facility])  Plan for transportation at discharge:      Financial    Payor: Miesha Schwartz / Plan: MEDICARE PART A AND B / Product Type: *No Product type* /     Does insurance require precert for SNF: No    Potential assistance Purchasing Medications: No  Meds-to-Beds request: Yes      [id number] - [place], [place] - [address] [address] [facility]  [facility] [address]  Phone: [phone] Fax: [phone]      Notes:    Factors facilitating achievement of predicted outcomes: Family support    Barriers to discharge: Medical complications    Additional Case Management Notes: I/S FiO2 30%, PEEP of 8, Transfer to [address] [place] [address] pain pump medication needs. The Plan for Transition of Care is related to the following treatment goals of COPD exacerbation ([address]) [F63.1]    IF APPLICABLE: The Patient and/or patient representative Juan Joiner and her family were provided with a choice of provider and agrees with the discharge plan. Freedom of choice list with basic dialogue that supports the patient's individualized plan of care/goals and shares the quality data associated with the providers was provided to: (P) Patient Representative   Patient Representative Name: (P) Pt's , Bar Squires     The Patient and/or Patient Representative Agree with the Discharge Plan? (P) Yes (POC dependent on pt progress, plans to transfer to [facility] d/t Pain Pump medication needs)    Barrington Eduardo RN  Case Management Department  Ph: [phone] Fax: [phone]

## 2023-08-03 NOTE — ACP (ADVANCE CARE PLANNING)
Advance Care Planning     Advance Care Planning Activator (Inpatient)  Conversation Note      Date of ACP Conversation: 8/3/2023     [employer] with:  Healthcare Decision Maker: Next of Kin by law (only applies in absence of above) (name) pt's , Shreya Mcginnis Activator: Lucero Park RN        Health Care Decision Maker:     Current Designated Health Care Decision Maker:     Primary Decision Maker (Active): Christa Dickinson Spouse - [phone]    Secondary Decision Maker: Hiwot Zapata - Child - [phone]    Supplemental (Other) Decision Maker: Bryan Lott - Child - [phone]  Click here to complete Healthcare Decision Makers including section of the Healthcare Decision Maker Relationship (ie \"Primary\")      Care Preferences    Ventilation: \"If you were in your present state of health and suddenly became very ill and were unable to breathe on your own, what would your preference be about the use of a ventilator (breathing machine) if it were available to you? \"      Would the patient desire the use of ventilator (breathing machine)?: yes    \"If your health worsens and it becomes clear that your chance of recovery is unlikely, what would your preference be about the use of a ventilator (breathing machine) if it were available to you? \"     Would the patient desire the use of ventilator (breathing machine)?: Yes      Resuscitation  \"CPR works best to restart the heart when there is a sudden event, like a heart attack, in someone who is otherwise healthy. Unfortunately, CPR does not typically restart the heart for people who have serious health conditions or who are very sick. \"    \"In the event your heart stopped as a result of an underlying serious health condition, would you want attempts to be made to restart your heart (answer \"yes\" for attempt to resuscitate) or would you prefer a natural death (answer \"no\" for do not attempt to resuscitate)? \" yes       [] Yes   [] No

## 2023-08-03 NOTE — H&P
Critical Care - History and Physical Examination    Patient's name:  Abena Agarwal Record Number: [id number]  Patient's account/billing number: [de-identified]  Patient's YOB: 1945  Age: 69 y.o. Date of Admission: 8/2/2023  7:33 PM  Reason of ICU admission:   Date of History and Physical Examination: 8/2/2023      Primary Care Physician: Zaida Barlow MD  Attending Physician: Dr. Minh Villagomez    Code Status: Prior    Chief complaint: SOB, AMS    Reason for ICU admission: Acute respiratory failure, intubated and sedated. History Of Present Illness:   History was obtained from chart review. Deni Florence is a 69 y.o. with a significant past medical history of pulmonary fibrosis, GERD, A-fib, biventricular congestive heart failure, COPD, with prior history of 2 intubations. Patient presented to the emergency room from home due to progressive difficulty breathing and shortness of breath. Per patient's  patient had been having worsening shortness of breath over the last 2 days. Chest x-ray was completed in [facility] ER that showed diffuse pulmonary infiltrates concerning for pneumonia. Laboratory results at that time showed COVID-19 negative, lactic acid 1.6, WBC 17.4, troponin 19, proBNP 281, initial blood gas of pH of 7.39, PCO2 47.7, PO2 40.6, bicarb of 20.2. Repeat ABGs at 3 PM showed pH of 7.32, PCO2 of 53.0, PO2 of 68.4 and bicarb of 27.1. Initial treatments in the [facility] emergency department were nebulizer treatments, Solu-Medrol and 2.5 L of fluids. Patient was placed on BiPAP and started on Zosyn and vancomycin. She was then transferred to their ICU upon entering ICU patient was obtunded, not engaging in conversation and only opening eyes to stimulation. Patient was also noted to be hypotensive at that time. Dr. Iraj Rivas was then contacted and intubation was planned. Patient was then placed on IV propofol and IV fentanyl for sedation.   She

## 2023-08-03 NOTE — CONSULTS
Comprehensive Nutrition Assessment    Type and Reason for Visit:  Initial, Consult (Tube Feedings Order and Management)    Nutrition Recommendations/Plan:   Monitor plans for nutrition and plan of care. If NG/OG placed, suggest starting Tube Feedings of Peptide Based formula goal rate 55 mL/hr = 1584 kcals, 99 gm pro/day. Malnutrition Assessment:  Malnutrition Status:  Insufficient data (08/03/23 1338)    Context:  Acute Illness     Findings of the 6 clinical characteristics of malnutrition:  Energy Intake:  Mild decrease in energy intake  Weight Loss:  No significant weight loss - 5% weight loss x 8 months per chart review. Body Fat Loss:  Unable to assess   Muscle Mass Loss:  Unable to assess  Fluid Accumulation:  No significant fluid accumulation   Strength:  Not Performed    Nutrition Assessment:    Consulted for Tube Feedings Order and Management. Admitted with SOB. Currently intubated and sedated. PMH: CAD, COPD, GERD. Noted no NG or OG in place. Will provide recommendations for Tube Feedings. Weight history reviewed - weight loss of 5% x 8 months noted. Labs reviewed: Glucose 142-162 mg/dL. Meds include: Decadron. Nutrition Related Findings:    Labs/Meds reviewed. Wound Type: None       Current Nutrition Intake & Therapies:    Average Meal Intake: NPO  Average Supplements Intake: NPO  Diet NPO  Additional Calorie Sources:  None    Anthropometric Measures:  Height: 5' 5\" (165.1 cm)  Ideal Body Weight (IBW): 125 lbs (57 kg)    Admission Body Weight: 154 lb 1.6 oz (69.9 kg)  Current Body Weight: 154 lb 1.6 oz (69.9 kg), 123.3 % IBW. Weight Source: Bed Scale  Current BMI (kg/m2): 25.6  Usual Body Weight: 162 lb 3 oz (73.6 kg) (1/5/23 bed scale per chart review)  % Weight Change (Calculated): -5                    BMI Categories: Overweight (BMI 25.0-29. 9)    Estimated Daily Nutrient Needs:  Energy Requirements Based On: Kcal/kg  Weight Used for Energy Requirements: Admission  Energy

## 2023-08-04 VITALS
WEIGHT: 154.1 LBS | SYSTOLIC BLOOD PRESSURE: 121 MMHG | DIASTOLIC BLOOD PRESSURE: 48 MMHG | OXYGEN SATURATION: 96 % | HEIGHT: 65 IN | TEMPERATURE: 97.9 F | RESPIRATION RATE: 22 BRPM | BODY MASS INDEX: 25.67 KG/M2 | HEART RATE: 80 BPM

## 2023-08-04 LAB
M PNEUMO IGM SER QL IA: 0.29
MICROORGANISM SPEC CULT: NORMAL
MICROORGANISM SPEC CULT: NORMAL
MICROORGANISM/AGENT SPEC: NORMAL
SPECIMEN DESCRIPTION: NORMAL

## 2023-08-04 PROCEDURE — 6360000002 HC RX W HCPCS: Performed by: INTERNAL MEDICINE

## 2023-08-04 PROCEDURE — 94640 AIRWAY INHALATION TREATMENT: CPT

## 2023-08-04 RX ADMIN — ALBUTEROL SULFATE 2.5 MG: 2.5 SOLUTION RESPIRATORY (INHALATION) at 00:04

## 2023-08-04 ASSESSMENT — PULMONARY FUNCTION TESTS: PIF_VALUE: 19

## 2023-08-04 NOTE — PROGRESS NOTES
[facility] to [facility]. Gave report to EMS including current infusions of fentanyl and versed. Pt had 6 rings as personal belongings that were given to EMS, all other belongings were previously taken home by . EMS bolus dose of 100 fentanyl @ 0019, and 5 Versed @ 0021. Restraints were taken off prior to discharge from unit. Transport by ground. Report called to [address] [facility] with all questions answered and advised pt belongings of rings are being transported with EMS.     Milo Tapia RN

## 2023-08-04 NOTE — PROGRESS NOTES
Moved ET tube out per order by 3 cm. ET tube placement at time of last Chest X-ray was 22 cm at gums. ET tube now at 19 cm at the Gums.

## 2023-08-04 NOTE — PLAN OF CARE
Problem: Chronic Conditions and Co-morbidities  Goal: Patient's chronic conditions and co-morbidity symptoms are monitored and maintained or improved  8/3/2023 2212 by Hiral Jimenez RN  Outcome: Progressing     Problem: Discharge Planning  Goal: Discharge to home or other facility with appropriate resources  8/3/2023 2212 by Hiral Jimenez RN  Outcome: Progressing     Problem: Safety - Adult  Goal: Free from fall injury  8/3/2023 2212 by Hiral Jimenez RN  Outcome: Progressing     Problem: Safety - Medical Restraint  Goal: Remains free of injury from restraints (Restraint for Interference with Medical Device)  Description: INTERVENTIONS:  1. Determine that other, less restrictive measures have been tried or would not be effective before applying the restraint  2. Evaluate the patient's condition at the time of restraint application  3. Inform patient/family regarding the reason for restraint  4. Q2H: Monitor safety, psychosocial status, comfort, nutrition and hydration  8/3/2023 2212 by Hiral Jimenez RN  Outcome: Progressing  Flowsheets (Taken 8/3/2023 1929)  Remains free of injury from restraints (restraint for interference with medical device): Every 2 hours: Monitor safety, psychosocial status, comfort, nutrition and hydration     Problem: Pain  Goal: Verbalizes/displays adequate comfort level or baseline comfort level  8/3/2023 2212 by Hiral Jimenez RN  Outcome: Progressing     Problem: Respiratory - Adult  Goal: Achieves optimal ventilation and oxygenation  8/3/2023 2212 by Hiral Jimenez RN  Outcome: Progressing     Problem: Nutrition Deficit:  Goal: Optimize nutritional status  Outcome: Progressing     Problem: Skin/Tissue Integrity  Goal: Absence of new skin breakdown  Description: 1. Monitor for areas of redness and/or skin breakdown  2. Assess vascular access sites hourly  3. Every 4-6 hours minimum:  Change oxygen saturation probe site  4.   Every 4-6 hours:  If on nasal continuous positive airway pressure, respiratory therapy assess nares and determine need for appliance change or resting period.   Outcome: Progressing     Problem: ABCDS Injury Assessment  Goal: Absence of physical injury  Outcome: Progressing

## 2023-08-09 NOTE — PROGRESS NOTES
Physician Progress Note      PATIENT:               Lance Dimas  CSN #:                  [id number]  :                       1945  ADMIT DATE:       2023 7:33 PM  [facility] DATE:        2023 1:00 AM  RESPONDING  PROVIDER #:        Lin REYNOSO          QUERY TEXT:    Pt admitted with SOB/Acute respiratory failure. Pt noted to have BP down to   71/45, RR up to 28, HR up to 111. Pt on Levophed gtt at [facility]. WBC up to   18.6, Lactic Acid up to 2.7, Procalcitonin up to 2.79. CXR :Worsening   bilateral infiltrates. Acute respiratory failure w/ intubation. If possible,   please document in the progress notes and discharge summary if you are   evaluating and /or treating any of the following: The medical record reflects the following:  Risk Factors: COPD  Clinical Indicators:  BP down to 71/45, RR up to 28, HR up to 111. Pt on   Levophed gtt at [facility]. WBC up to 18.6, Lactic Acid up to 2.7,   Procalcitonin up to 2.79. CXR :Worsening bilateral infiltrates. Acute   respiratory failure w/ intubation. Treatment: Vanco and Zosyn, Respiratory hygiene and assessment frequently. Monitor labs imaging and vital signs. Thank you for your time, Amada Toussaint MSN, RN CDS. Please call/text/PS with any   questions; [phone]. Options provided:  -- Sepsis with septic shock, present on admission  -- Sepsis with septic shock, present on admission, now resolved  -- Sepsis, present on admission  -- Sepsis, present on admission, now resolved  -- Sepsis, developed following admission  -- Pneumonia without Sepsis  -- Other - I will add my own diagnosis  -- Disagree - Not applicable / Not valid  -- Disagree - Clinically unable to determine / Unknown  -- Refer to Clinical Documentation Reviewer    PROVIDER RESPONSE TEXT:    This patient has sepsis with sepsis shock which was present on admission.     Query created by: Ranjana Jackson on 8/3/2023 7:55 PM      Electronically signed by:  Thiago Rivera

## 2023-08-17 LAB — LEGIONELLA SPECIES CULTURE: NORMAL

## 2023-08-21 ENCOUNTER — OFFICE VISIT (OUTPATIENT)
Dept: PULMONOLOGY | Age: 78
End: 2023-08-21
Payer: MEDICARE

## 2023-08-21 VITALS
TEMPERATURE: 97.6 F | HEIGHT: 65 IN | SYSTOLIC BLOOD PRESSURE: 129 MMHG | BODY MASS INDEX: 24.83 KG/M2 | OXYGEN SATURATION: 90 % | WEIGHT: 149 LBS | RESPIRATION RATE: 20 BRPM | HEART RATE: 79 BPM | DIASTOLIC BLOOD PRESSURE: 63 MMHG

## 2023-08-21 DIAGNOSIS — K44.9 HIATAL HERNIA: ICD-10-CM

## 2023-08-21 DIAGNOSIS — J47.9 BRONCHIECTASIS WITHOUT COMPLICATION (HCC): Primary | ICD-10-CM

## 2023-08-21 DIAGNOSIS — Z86.16 HISTORY OF COVID-19: ICD-10-CM

## 2023-08-21 DIAGNOSIS — J84.10 PULMONARY FIBROSIS (HCC): ICD-10-CM

## 2023-08-21 DIAGNOSIS — K21.9 GASTROESOPHAGEAL REFLUX DISEASE, UNSPECIFIED WHETHER ESOPHAGITIS PRESENT: ICD-10-CM

## 2023-08-21 PROCEDURE — 99214 OFFICE O/P EST MOD 30 MIN: CPT | Performed by: INTERNAL MEDICINE

## 2023-08-21 PROCEDURE — G8400 PT W/DXA NO RESULTS DOC: HCPCS | Performed by: INTERNAL MEDICINE

## 2023-08-21 PROCEDURE — 1111F DSCHRG MED/CURRENT MED MERGE: CPT | Performed by: INTERNAL MEDICINE

## 2023-08-21 PROCEDURE — 1123F ACP DISCUSS/DSCN MKR DOCD: CPT | Performed by: INTERNAL MEDICINE

## 2023-08-21 PROCEDURE — 1090F PRES/ABSN URINE INCON ASSESS: CPT | Performed by: INTERNAL MEDICINE

## 2023-08-21 PROCEDURE — 1036F TOBACCO NON-USER: CPT | Performed by: INTERNAL MEDICINE

## 2023-08-21 PROCEDURE — G8427 DOCREV CUR MEDS BY ELIG CLIN: HCPCS | Performed by: INTERNAL MEDICINE

## 2023-08-21 PROCEDURE — G8420 CALC BMI NORM PARAMETERS: HCPCS | Performed by: INTERNAL MEDICINE

## 2023-08-21 NOTE — PATIENT INSTRUCTIONS
SURVEY:    You may be receiving a survey from [employer] regarding your visit today. Please complete the survey to enable us to provide the highest quality of care to you and your family. If you cannot score us a very good on any question, please call the office to discuss how we could have made your experience a very good one. Thank you.

## 2023-08-21 NOTE — PROGRESS NOTES
PULMONARY OP  PROGRESS NOTE      Patient:  Kathy Delgado  YOB: 1945    MRN: [id number]     Acct: [de-identified]       Pt seen and Chart reviewed. Kathy Delgado is here in followup for   1. Bronchiectasis without complication ([address])    2. Hiatal hernia    3. Gastroesophageal reflux disease, unspecified whether esophagitis present    4. Pulmonary fibrosis ([address])    5. History of COVID-19          Pt  has been hospitalized since last visit for a pneumonia. She required mechanical ventilation  She was transferred from [place] to [address] [place] [address] and subsequently [facility] secondary to her pain medication pump needing refilled  Has been using meds as recommended.   Denied much of cough or sputum production  Denied much of shortness of breath or wheezing  No chest pain or pressure  Her appetite has improved  No fever or chills or night sweats  Not coughing up any blood  No acid reflux symptoms  No orthopnea, PND or increasing pedal edema    Subjective:  Review of Systems    Review of Systems -   General ROS: Completed and except as mentioned above were negative   Psychological ROS:  Completed and except as mentioned above were negative  Ophthalmic ROS:  Completed and except as mentioned above were negative  ENT ROS:  Completed and except as mentioned above were negative  Allergy and Immunology ROS:  Completed and except as mentioned above werenegative  Hematological and Lymphatic ROS:  Completed and except as mentioned above were negative  Endocrine ROS: Completed and except as mentioned above were negative  Respiratory ROS:  Completed and except asmentioned above were negative  Cardiovascular ROS:  Completed and except as mentioned above were negative  Gastrointestinal ROS: Completed and except as mentioned above were negative  Genito-Urinary ROS:  Completedand except as mentioned above were negative  Musculoskeletal ROS:  Completed and except as mentioned above were negative  Neurological ROS:

## 2023-09-06 ENCOUNTER — OFFICE VISIT (OUTPATIENT)
Dept: SURGERY | Age: 78
End: 2023-09-06
Payer: MEDICARE

## 2023-09-06 ENCOUNTER — TELEPHONE (OUTPATIENT)
Dept: SURGERY | Age: 78
End: 2023-09-06

## 2023-09-06 DIAGNOSIS — L72.0 INCLUSION CYST: Primary | ICD-10-CM

## 2023-09-06 PROBLEM — R13.12 DYSPHAGIA, OROPHARYNGEAL: Status: ACTIVE | Noted: 2023-08-07

## 2023-09-06 PROBLEM — E44.1 MALNUTRITION OF MILD DEGREE (HCC): Status: ACTIVE | Noted: 2023-08-04

## 2023-09-06 PROBLEM — G89.29 CHRONIC PAIN: Status: ACTIVE | Noted: 2017-05-05

## 2023-09-06 PROBLEM — I50.82 BIVENTRICULAR HEART FAILURE (HCC): Status: ACTIVE | Noted: 2023-08-04

## 2023-09-06 PROBLEM — A49.8 CITROBACTER INFECTION: Status: ACTIVE | Noted: 2023-08-09

## 2023-09-06 PROBLEM — J18.9 MULTIFOCAL PNEUMONIA: Status: ACTIVE | Noted: 2023-08-04

## 2023-09-06 PROBLEM — J96.22 ACUTE ON CHRONIC RESPIRATORY FAILURE WITH HYPOXIA AND HYPERCAPNIA (HCC): Status: ACTIVE | Noted: 2023-08-05

## 2023-09-06 PROBLEM — J47.9 BRONCHIECTASIS (HCC): Status: ACTIVE | Noted: 2023-08-04

## 2023-09-06 PROBLEM — J96.21 ACUTE ON CHRONIC RESPIRATORY FAILURE WITH HYPOXIA AND HYPERCAPNIA (HCC): Status: ACTIVE | Noted: 2023-08-05

## 2023-09-06 PROBLEM — K22.5 ZENKER DIVERTICULUM: Status: ACTIVE | Noted: 2023-08-06

## 2023-09-06 PROCEDURE — 99203 OFFICE O/P NEW LOW 30 MIN: CPT | Performed by: SURGERY

## 2023-09-06 PROCEDURE — 1090F PRES/ABSN URINE INCON ASSESS: CPT | Performed by: SURGERY

## 2023-09-06 PROCEDURE — 1036F TOBACCO NON-USER: CPT | Performed by: SURGERY

## 2023-09-06 PROCEDURE — G8427 DOCREV CUR MEDS BY ELIG CLIN: HCPCS | Performed by: SURGERY

## 2023-09-06 PROCEDURE — 1123F ACP DISCUSS/DSCN MKR DOCD: CPT | Performed by: SURGERY

## 2023-09-06 PROCEDURE — G8420 CALC BMI NORM PARAMETERS: HCPCS | Performed by: SURGERY

## 2023-09-06 PROCEDURE — G8400 PT W/DXA NO RESULTS DOC: HCPCS | Performed by: SURGERY

## 2023-09-06 NOTE — TELEPHONE ENCOUNTER
Patient is having a cyst excision procedure 9/27/23. When would you like her to stop her Xarelto and Aspirin?

## 2023-09-08 NOTE — TELEPHONE ENCOUNTER
Only if required by the surgeon. She can hold Xarelto for 3 days prior to the procedure and aspirin for 7 days prior to the procedure.   Thank you

## 2023-09-09 VITALS
HEART RATE: 71 BPM | BODY MASS INDEX: 24.61 KG/M2 | RESPIRATION RATE: 18 BRPM | HEIGHT: 65 IN | DIASTOLIC BLOOD PRESSURE: 64 MMHG | SYSTOLIC BLOOD PRESSURE: 118 MMHG | WEIGHT: 147.7 LBS | OXYGEN SATURATION: 92 %

## 2023-09-09 NOTE — PROGRESS NOTES
GENERAL SURGERY CONSULTATION      Patient's Name/ Date of Birth/ Gender: Kathy Delgado / 1945 (69 y.o.) / female     PCP: Kelin Chaudhry MD  Referring:     History of present Illness:  Patient is a pleasant 69 y.o. female  kindly referred by Kelin Chaudhry MD  Anterior chest wall mass, getting larger, so signs infection, causing discomfort with growth, here with . N blood thinneres xarelto and baby asa. Spends a lot of time in the sun, very tan. Past Medical History:  has a past medical history of A-fib ([address]), Anxiety, Atrial fibrillation (HCC), Biventricular congestive heart failure ([address]), Bronchiectasis with acute exacerbation ([address]), CAD (coronary artery disease), Chronic pain syndrome, COPD (chronic obstructive pulmonary disease) ([address]), Depression, GERD (gastroesophageal reflux disease), Hypothyroidism, Impaired fasting glucose, Iron deficiency anemia, Osteoporosis, Pulmonary fibrosis ([address]), and Subdural hematoma ([address]).     Past Surgical History:   Past Surgical History:   Procedure Laterality Date    BACK SURGERY  09/09/1998    spinal cord stimulator    BACK SURGERY  08/04/1999    infusion pump insertion    BACK SURGERY  10/23/2003    spinal cord stimulator removed    BACK SURGERY  10/23/2003    new infusion pump placed    BACK SURGERY  09/11/2017    replaced infusion pump    CARPAL TUNNEL RELEASE Right 12/17/1999    CARPAL TUNNEL RELEASE Left 11/24/1999    CARPAL TUNNEL RELEASE Right 12/30/2002    CARPAL TUNNEL RELEASE Left 12/17/2003    CERVICAL DISC SURGERY  10/25/2004    anterior cervical diskectomy C7-T1    CERVICAL DISC SURGERY  12/22/2004    anterior cervical discectomy P6-5 (complicated by damaged nerve to vocal cord)    CRANIOTOMY Left 8/23/2022    LEFT CRANIOTOMY FOR SUBDURAL HEMATOMA EVACUATION performed by Kenny Devlin DO at [address] Left 12/20/2018    ORIF wrist    HUMERUS FRACTURE SURGERY Right 10/03/2010    HYSTERECTOMY (CERVIX STATUS UNKNOWN)

## 2023-09-27 ENCOUNTER — PROCEDURE VISIT (OUTPATIENT)
Dept: SURGERY | Age: 78
End: 2023-09-27
Payer: MEDICARE

## 2023-09-27 DIAGNOSIS — L72.0 INCLUSION CYST: Primary | ICD-10-CM

## 2023-09-27 PROCEDURE — 11402 EXC TR-EXT B9+MARG 1.1-2 CM: CPT | Performed by: SURGERY

## 2023-09-29 ENCOUNTER — HOSPITAL ENCOUNTER (OUTPATIENT)
Age: 78
Discharge: HOME | End: 2023-09-29
Payer: MEDICARE

## 2023-09-29 DIAGNOSIS — R73.01 IMPAIRED FASTING GLUCOSE: ICD-10-CM

## 2023-09-29 DIAGNOSIS — E03.9 HYPOTHYROIDISM, UNSPECIFIED TYPE: ICD-10-CM

## 2023-09-29 DIAGNOSIS — E78.2 MIXED HYPERLIPIDEMIA: ICD-10-CM

## 2023-09-29 LAB
ALT SERPL-CCNC: 12 U/L (ref 5–33)
ANION GAP SERPL CALCULATED.3IONS-SCNC: 9 MMOL/L (ref 9–17)
AST SERPL-CCNC: 20 U/L
BUN SERPL-MCNC: 19 MG/DL (ref 8–23)
BUN/CREAT SERPL: 24 (ref 9–20)
CALCIUM SERPL-MCNC: 9.8 MG/DL (ref 8.6–10.4)
CHLORIDE SERPL-SCNC: 99 MMOL/L (ref 98–107)
CHOLEST SERPL-MCNC: 217 MG/DL
CHOLESTEROL/HDL RATIO: 3.1
CO2 SERPL-SCNC: 33 MMOL/L (ref 20–31)
CREAT SERPL-MCNC: 0.8 MG/DL (ref 0.5–0.9)
ERYTHROCYTE [DISTWIDTH] IN BLOOD BY AUTOMATED COUNT: 12.8 % (ref 11.8–14.4)
EST. AVERAGE GLUCOSE BLD GHB EST-MCNC: 91 MG/DL
GFR SERPL CREATININE-BSD FRML MDRD: >60 ML/MIN/1.73M2
GLUCOSE SERPL-MCNC: 89 MG/DL (ref 70–99)
HBA1C MFR BLD: 4.8 % (ref 4–6)
HCT VFR BLD AUTO: 44.9 % (ref 36.3–47.1)
HDLC SERPL-MCNC: 70 MG/DL
HGB BLD-MCNC: 14.5 G/DL (ref 11.9–15.1)
LDLC SERPL CALC-MCNC: 130 MG/DL (ref 0–130)
MCH RBC QN AUTO: 31.1 PG (ref 25.2–33.5)
MCHC RBC AUTO-ENTMCNC: 32.3 G/DL (ref 28.4–34.8)
MCV RBC AUTO: 96.4 FL (ref 82.6–102.9)
NRBC BLD-RTO: 0 PER 100 WBC
PLATELET # BLD AUTO: 249 K/UL (ref 138–453)
PMV BLD AUTO: 9.7 FL (ref 8.1–13.5)
POTASSIUM SERPL-SCNC: 4 MMOL/L (ref 3.7–5.3)
RBC # BLD AUTO: 4.66 M/UL (ref 3.95–5.11)
SODIUM SERPL-SCNC: 141 MMOL/L (ref 135–144)
T3FREE SERPL-MCNC: 2.21 PG/ML (ref 2.02–4.43)
T4 FREE SERPL-MCNC: 1.1 NG/DL (ref 0.9–1.7)
TRIGL SERPL-MCNC: 87 MG/DL
TSH SERPL DL<=0.05 MIU/L-ACNC: 1.85 UIU/ML (ref 0.3–5)
WBC OTHER # BLD: 4.3 K/UL (ref 3.5–11.3)

## 2023-09-29 PROCEDURE — 84481 FREE ASSAY (FT-3): CPT

## 2023-09-29 PROCEDURE — 36415 COLL VENOUS BLD VENIPUNCTURE: CPT

## 2023-09-29 PROCEDURE — 80061 LIPID PANEL: CPT

## 2023-09-29 PROCEDURE — 83036 HEMOGLOBIN GLYCOSYLATED A1C: CPT

## 2023-09-29 PROCEDURE — 84450 TRANSFERASE (AST) (SGOT): CPT

## 2023-09-29 PROCEDURE — 84443 ASSAY THYROID STIM HORMONE: CPT

## 2023-09-29 PROCEDURE — 80048 BASIC METABOLIC PNL TOTAL CA: CPT

## 2023-09-29 PROCEDURE — 85027 COMPLETE CBC AUTOMATED: CPT

## 2023-09-29 PROCEDURE — 84460 ALANINE AMINO (ALT) (SGPT): CPT

## 2023-09-29 PROCEDURE — 84439 ASSAY OF FREE THYROXINE: CPT

## 2023-10-02 RX ORDER — RIVAROXABAN 20 MG/1
20 TABLET, FILM COATED ORAL DAILY
Qty: 90 TABLET | Refills: 3 | Status: SHIPPED | OUTPATIENT
Start: 2023-10-02

## 2023-10-03 VITALS
DIASTOLIC BLOOD PRESSURE: 85 MMHG | HEIGHT: 65 IN | RESPIRATION RATE: 18 BRPM | BODY MASS INDEX: 24.16 KG/M2 | OXYGEN SATURATION: 92 % | HEART RATE: 70 BPM | SYSTOLIC BLOOD PRESSURE: 132 MMHG | WEIGHT: 145 LBS

## 2023-10-25 ENCOUNTER — HOSPITAL ENCOUNTER (OUTPATIENT)
Dept: WOMENS IMAGING | Age: 78
Discharge: HOME | End: 2023-10-27
Payer: MEDICARE

## 2023-10-25 DIAGNOSIS — Z78.0 POST-MENOPAUSAL: ICD-10-CM

## 2023-10-25 PROCEDURE — 77081 DXA BONE DENSITY APPENDICULR: CPT

## 2023-11-16 NOTE — FLOWSHEET NOTE
Awake, resting in bed. Vitals checked and assessment completed. O2 2 liters on. Occasional cough noted. . No needs at present time .  Continue to monitor
On BSC with 2 assist. Shaky, unsteady on her feet. Max of 2 assist to transfer. Returned to chair for lunch. Call light within reach.
Right foot pain

## 2023-12-06 ENCOUNTER — HOSPITAL ENCOUNTER (INPATIENT)
Age: 78
LOS: 2 days | Discharge: HOME | End: 2023-12-08
Attending: EMERGENCY MEDICINE | Admitting: INTERNAL MEDICINE
Payer: MEDICARE

## 2023-12-06 ENCOUNTER — APPOINTMENT (OUTPATIENT)
Dept: GENERAL RADIOLOGY | Age: 78
End: 2023-12-06
Payer: MEDICARE

## 2023-12-06 DIAGNOSIS — J18.9 PNEUMONIA OF BOTH LUNGS DUE TO INFECTIOUS ORGANISM, UNSPECIFIED PART OF LUNG: ICD-10-CM

## 2023-12-06 DIAGNOSIS — J44.1 COPD EXACERBATION (HCC): Primary | ICD-10-CM

## 2023-12-06 LAB
ALBUMIN SERPL-MCNC: 4 G/DL (ref 3.5–5.2)
ALBUMIN/GLOB SERPL: 1.3 {RATIO} (ref 1–2.5)
ALP SERPL-CCNC: 71 U/L (ref 35–104)
ALT SERPL-CCNC: 15 U/L (ref 5–33)
ANION GAP SERPL CALCULATED.3IONS-SCNC: 9 MMOL/L (ref 9–17)
AST SERPL-CCNC: 18 U/L
BASOPHILS # BLD: <0.03 K/UL (ref 0–0.2)
BASOPHILS NFR BLD: 0 % (ref 0–2)
BILIRUB SERPL-MCNC: 0.4 MG/DL (ref 0.3–1.2)
BNP SERPL-MCNC: 795 PG/ML
BUN SERPL-MCNC: 14 MG/DL (ref 8–23)
BUN/CREAT SERPL: 28 (ref 9–20)
CALCIUM SERPL-MCNC: 9.3 MG/DL (ref 8.6–10.4)
CHLORIDE SERPL-SCNC: 98 MMOL/L (ref 98–107)
CO2 SERPL-SCNC: 28 MMOL/L (ref 20–31)
CREAT SERPL-MCNC: 0.5 MG/DL (ref 0.5–0.9)
EKG ATRIAL RATE: 94 BPM
EKG P AXIS: 70 DEGREES
EKG P-R INTERVAL: 178 MS
EKG Q-T INTERVAL: 360 MS
EKG QRS DURATION: 80 MS
EKG QTC CALCULATION (BAZETT): 450 MS
EKG R AXIS: -10 DEGREES
EKG T AXIS: 53 DEGREES
EKG VENTRICULAR RATE: 94 BPM
EOSINOPHIL # BLD: <0.03 K/UL (ref 0–0.44)
EOSINOPHILS RELATIVE PERCENT: 0 % (ref 1–4)
ERYTHROCYTE [DISTWIDTH] IN BLOOD BY AUTOMATED COUNT: 13.9 % (ref 11.8–14.4)
FLUAV AG SPEC QL: NEGATIVE
FLUBV AG SPEC QL: NEGATIVE
GAS FLOW.O2 O2 DELIVERY SYS: ABNORMAL L/MIN
GFR SERPL CREATININE-BSD FRML MDRD: >60 ML/MIN/1.73M2
GLUCOSE SERPL-MCNC: 106 MG/DL (ref 70–99)
HCO3 VENOUS: 27.6 MMOL/L (ref 24–30)
HCT VFR BLD AUTO: 40.9 % (ref 36.3–47.1)
HGB BLD-MCNC: 13.1 G/DL (ref 11.9–15.1)
IMM GRANULOCYTES # BLD AUTO: <0.03 K/UL (ref 0–0.3)
IMM GRANULOCYTES NFR BLD: 0 %
LACTATE BLDV-SCNC: 0.8 MMOL/L (ref 0.5–2.2)
LYMPHOCYTES NFR BLD: 0.72 K/UL (ref 1.1–3.7)
LYMPHOCYTES RELATIVE PERCENT: 10 % (ref 24–43)
MCH RBC QN AUTO: 30 PG (ref 25.2–33.5)
MCHC RBC AUTO-ENTMCNC: 32 G/DL (ref 28.4–34.8)
MCV RBC AUTO: 93.8 FL (ref 82.6–102.9)
MONOCYTES NFR BLD: 0.48 K/UL (ref 0.1–1.2)
MONOCYTES NFR BLD: 7 % (ref 3–12)
NEUTROPHILS NFR BLD: 83 % (ref 36–65)
NEUTS SEG NFR BLD: 5.78 K/UL (ref 1.5–8.1)
NRBC BLD-RTO: 0 PER 100 WBC
O2 SAT, VEN: 86.2 % (ref 60–85)
PCO2, VEN: 46.1 MM HG (ref 39–55)
PH VENOUS: 7.39 (ref 7.32–7.42)
PLATELET # BLD AUTO: 191 K/UL (ref 138–453)
PMV BLD AUTO: 9.8 FL (ref 8.1–13.5)
PO2, VEN: 51.6 MM HG (ref 30–50)
POSITIVE BASE EXCESS, VEN: 2.1 MMOL/L (ref 0–2)
POTASSIUM SERPL-SCNC: 3.8 MMOL/L (ref 3.7–5.3)
PROT SERPL-MCNC: 7.2 G/DL (ref 6.4–8.3)
RBC # BLD AUTO: 4.36 M/UL (ref 3.95–5.11)
RESPIRATORY RATE: 20
SARS-COV-2 RDRP RESP QL NAA+PROBE: NOT DETECTED
SODIUM SERPL-SCNC: 135 MMOL/L (ref 135–144)
SPECIMEN DESCRIPTION: NORMAL
TEXT FOR RESPIRATORY: ABNORMAL
TROPONIN I SERPL HS-MCNC: 18 NG/L (ref 0–14)
WBC OTHER # BLD: 7 K/UL (ref 3.5–11.3)

## 2023-12-06 PROCEDURE — 6370000000 HC RX 637 (ALT 250 FOR IP): Performed by: NURSE PRACTITIONER

## 2023-12-06 PROCEDURE — 2700000000 HC OXYGEN THERAPY PER DAY

## 2023-12-06 PROCEDURE — 85025 COMPLETE CBC W/AUTO DIFF WBC: CPT

## 2023-12-06 PROCEDURE — 96374 THER/PROPH/DIAG INJ IV PUSH: CPT

## 2023-12-06 PROCEDURE — 87040 BLOOD CULTURE FOR BACTERIA: CPT

## 2023-12-06 PROCEDURE — 6370000000 HC RX 637 (ALT 250 FOR IP): Performed by: EMERGENCY MEDICINE

## 2023-12-06 PROCEDURE — 97162 PT EVAL MOD COMPLEX 30 MIN: CPT

## 2023-12-06 PROCEDURE — 36415 COLL VENOUS BLD VENIPUNCTURE: CPT

## 2023-12-06 PROCEDURE — 6360000002 HC RX W HCPCS: Performed by: NURSE PRACTITIONER

## 2023-12-06 PROCEDURE — 2580000003 HC RX 258

## 2023-12-06 PROCEDURE — 99285 EMERGENCY DEPT VISIT HI MDM: CPT

## 2023-12-06 PROCEDURE — 83880 ASSAY OF NATRIURETIC PEPTIDE: CPT

## 2023-12-06 PROCEDURE — 94669 MECHANICAL CHEST WALL OSCILL: CPT

## 2023-12-06 PROCEDURE — 6360000002 HC RX W HCPCS: Performed by: EMERGENCY MEDICINE

## 2023-12-06 PROCEDURE — 2580000003 HC RX 258: Performed by: NURSE PRACTITIONER

## 2023-12-06 PROCEDURE — 87635 SARS-COV-2 COVID-19 AMP PRB: CPT

## 2023-12-06 PROCEDURE — 1200000000 HC SEMI PRIVATE

## 2023-12-06 PROCEDURE — 82805 BLOOD GASES W/O2 SATURATION: CPT

## 2023-12-06 PROCEDURE — 93010 ELECTROCARDIOGRAM REPORT: CPT | Performed by: INTERNAL MEDICINE

## 2023-12-06 PROCEDURE — 80053 COMPREHEN METABOLIC PANEL: CPT

## 2023-12-06 PROCEDURE — 97535 SELF CARE MNGMENT TRAINING: CPT

## 2023-12-06 PROCEDURE — 97166 OT EVAL MOD COMPLEX 45 MIN: CPT

## 2023-12-06 PROCEDURE — 71045 X-RAY EXAM CHEST 1 VIEW: CPT

## 2023-12-06 PROCEDURE — 84484 ASSAY OF TROPONIN QUANT: CPT

## 2023-12-06 PROCEDURE — 93005 ELECTROCARDIOGRAM TRACING: CPT | Performed by: EMERGENCY MEDICINE

## 2023-12-06 PROCEDURE — 84145 PROCALCITONIN (PCT): CPT

## 2023-12-06 PROCEDURE — 0202U NFCT DS 22 TRGT SARS-COV-2: CPT

## 2023-12-06 PROCEDURE — 94664 DEMO&/EVAL PT USE INHALER: CPT

## 2023-12-06 PROCEDURE — 87205 SMEAR GRAM STAIN: CPT

## 2023-12-06 PROCEDURE — 87070 CULTURE OTHR SPECIMN AEROBIC: CPT

## 2023-12-06 PROCEDURE — 83605 ASSAY OF LACTIC ACID: CPT

## 2023-12-06 PROCEDURE — 94640 AIRWAY INHALATION TREATMENT: CPT

## 2023-12-06 PROCEDURE — 96375 TX/PRO/DX INJ NEW DRUG ADDON: CPT

## 2023-12-06 PROCEDURE — 87804 INFLUENZA ASSAY W/OPTIC: CPT

## 2023-12-06 RX ORDER — POTASSIUM CHLORIDE 20 MEQ/1
20 TABLET, EXTENDED RELEASE ORAL 2 TIMES DAILY
Status: DISCONTINUED | OUTPATIENT
Start: 2023-12-06 | End: 2023-12-08 | Stop reason: HOSPADM

## 2023-12-06 RX ORDER — ACETAMINOPHEN 650 MG/1
650 SUPPOSITORY RECTAL EVERY 6 HOURS PRN
Status: DISCONTINUED | OUTPATIENT
Start: 2023-12-06 | End: 2023-12-08 | Stop reason: HOSPADM

## 2023-12-06 RX ORDER — BUMETANIDE 1 MG/1
2 TABLET ORAL DAILY
Status: DISCONTINUED | OUTPATIENT
Start: 2023-12-07 | End: 2023-12-06

## 2023-12-06 RX ORDER — SODIUM CHLORIDE 0.9 % (FLUSH) 0.9 %
5-40 SYRINGE (ML) INJECTION EVERY 12 HOURS SCHEDULED
Status: DISCONTINUED | OUTPATIENT
Start: 2023-12-06 | End: 2023-12-08 | Stop reason: HOSPADM

## 2023-12-06 RX ORDER — SODIUM CHLORIDE 9 MG/ML
INJECTION, SOLUTION INTRAVENOUS PRN
Status: DISCONTINUED | OUTPATIENT
Start: 2023-12-06 | End: 2023-12-08 | Stop reason: HOSPADM

## 2023-12-06 RX ORDER — SODIUM CHLORIDE 0.9 % (FLUSH) 0.9 %
5-40 SYRINGE (ML) INJECTION PRN
Status: DISCONTINUED | OUTPATIENT
Start: 2023-12-06 | End: 2023-12-08 | Stop reason: HOSPADM

## 2023-12-06 RX ORDER — FAMOTIDINE 20 MG/1
20 TABLET, FILM COATED ORAL 2 TIMES DAILY
Status: DISCONTINUED | OUTPATIENT
Start: 2023-12-06 | End: 2023-12-08 | Stop reason: HOSPADM

## 2023-12-06 RX ORDER — WATER 10 ML/10ML
INJECTION INTRAMUSCULAR; INTRAVENOUS; SUBCUTANEOUS
Status: COMPLETED
Start: 2023-12-06 | End: 2023-12-06

## 2023-12-06 RX ORDER — CITALOPRAM 20 MG/1
20 TABLET ORAL
Status: DISCONTINUED | OUTPATIENT
Start: 2023-12-06 | End: 2023-12-08 | Stop reason: HOSPADM

## 2023-12-06 RX ORDER — ASPIRIN 81 MG/1
81 TABLET ORAL
Status: DISCONTINUED | OUTPATIENT
Start: 2023-12-07 | End: 2023-12-08 | Stop reason: HOSPADM

## 2023-12-06 RX ORDER — SODIUM CHLORIDE 9 MG/ML
INJECTION, SOLUTION INTRAVENOUS CONTINUOUS
Status: DISCONTINUED | OUTPATIENT
Start: 2023-12-06 | End: 2023-12-06

## 2023-12-06 RX ORDER — LEVOFLOXACIN 750 MG/1
750 TABLET, FILM COATED ORAL EVERY 24 HOURS
Status: DISCONTINUED | OUTPATIENT
Start: 2023-12-07 | End: 2023-12-08 | Stop reason: HOSPADM

## 2023-12-06 RX ORDER — METHYLPREDNISOLONE SODIUM SUCCINATE 125 MG/2ML
60 INJECTION, POWDER, LYOPHILIZED, FOR SOLUTION INTRAMUSCULAR; INTRAVENOUS EVERY 12 HOURS
Status: DISCONTINUED | OUTPATIENT
Start: 2023-12-06 | End: 2023-12-08 | Stop reason: HOSPADM

## 2023-12-06 RX ORDER — HYDROCODONE BITARTRATE AND ACETAMINOPHEN 10; 325 MG/1; MG/1
1 TABLET ORAL EVERY 8 HOURS PRN
Status: DISCONTINUED | OUTPATIENT
Start: 2023-12-06 | End: 2023-12-08 | Stop reason: HOSPADM

## 2023-12-06 RX ORDER — METHYLPREDNISOLONE SODIUM SUCCINATE 125 MG/2ML
125 INJECTION, POWDER, LYOPHILIZED, FOR SOLUTION INTRAMUSCULAR; INTRAVENOUS ONCE
Status: COMPLETED | OUTPATIENT
Start: 2023-12-06 | End: 2023-12-06

## 2023-12-06 RX ORDER — PANTOPRAZOLE SODIUM 40 MG/1
40 TABLET, DELAYED RELEASE ORAL
Status: DISCONTINUED | OUTPATIENT
Start: 2023-12-07 | End: 2023-12-08 | Stop reason: HOSPADM

## 2023-12-06 RX ORDER — IPRATROPIUM BROMIDE AND ALBUTEROL SULFATE 2.5; .5 MG/3ML; MG/3ML
1 SOLUTION RESPIRATORY (INHALATION)
Status: DISCONTINUED | OUTPATIENT
Start: 2023-12-06 | End: 2023-12-07

## 2023-12-06 RX ORDER — M-VIT,TX,IRON,MINS/CALC/FOLIC 27MG-0.4MG
1 TABLET ORAL DAILY
Status: DISCONTINUED | OUTPATIENT
Start: 2023-12-07 | End: 2023-12-08 | Stop reason: HOSPADM

## 2023-12-06 RX ORDER — ACETAMINOPHEN 325 MG/1
650 TABLET ORAL EVERY 6 HOURS PRN
Status: DISCONTINUED | OUTPATIENT
Start: 2023-12-06 | End: 2023-12-08 | Stop reason: HOSPADM

## 2023-12-06 RX ORDER — LEVOFLOXACIN 5 MG/ML
750 INJECTION, SOLUTION INTRAVENOUS EVERY 24 HOURS
Status: DISCONTINUED | OUTPATIENT
Start: 2023-12-06 | End: 2023-12-07

## 2023-12-06 RX ORDER — TRAZODONE HYDROCHLORIDE 50 MG/1
100 TABLET ORAL NIGHTLY
Status: DISCONTINUED | OUTPATIENT
Start: 2023-12-06 | End: 2023-12-08 | Stop reason: HOSPADM

## 2023-12-06 RX ORDER — LANSOPRAZOLE
30 KIT
Status: DISCONTINUED | OUTPATIENT
Start: 2023-12-07 | End: 2023-12-06

## 2023-12-06 RX ORDER — ENOXAPARIN SODIUM 100 MG/ML
40 INJECTION SUBCUTANEOUS DAILY
Status: DISCONTINUED | OUTPATIENT
Start: 2023-12-06 | End: 2023-12-06 | Stop reason: SDUPTHER

## 2023-12-06 RX ORDER — TIZANIDINE 4 MG/1
4 TABLET ORAL DAILY PRN
Status: DISCONTINUED | OUTPATIENT
Start: 2023-12-06 | End: 2023-12-08 | Stop reason: HOSPADM

## 2023-12-06 RX ORDER — IPRATROPIUM BROMIDE AND ALBUTEROL SULFATE 2.5; .5 MG/3ML; MG/3ML
1 SOLUTION RESPIRATORY (INHALATION) ONCE
Status: COMPLETED | OUTPATIENT
Start: 2023-12-06 | End: 2023-12-06

## 2023-12-06 RX ORDER — ASCORBIC ACID 500 MG
500 TABLET ORAL 2 TIMES DAILY
Status: DISCONTINUED | OUTPATIENT
Start: 2023-12-06 | End: 2023-12-08 | Stop reason: HOSPADM

## 2023-12-06 RX ORDER — ONDANSETRON 2 MG/ML
4 INJECTION INTRAMUSCULAR; INTRAVENOUS EVERY 6 HOURS PRN
Status: DISCONTINUED | OUTPATIENT
Start: 2023-12-06 | End: 2023-12-08 | Stop reason: HOSPADM

## 2023-12-06 RX ORDER — GUAIFENESIN/DEXTROMETHORPHAN 100-10MG/5
5 SYRUP ORAL EVERY 4 HOURS PRN
Status: DISCONTINUED | OUTPATIENT
Start: 2023-12-06 | End: 2023-12-08 | Stop reason: HOSPADM

## 2023-12-06 RX ORDER — LEVOTHYROXINE SODIUM 0.1 MG/1
200 TABLET ORAL DAILY
Status: DISCONTINUED | OUTPATIENT
Start: 2023-12-07 | End: 2023-12-08 | Stop reason: HOSPADM

## 2023-12-06 RX ORDER — ONDANSETRON 4 MG/1
4 TABLET, ORALLY DISINTEGRATING ORAL EVERY 8 HOURS PRN
Status: DISCONTINUED | OUTPATIENT
Start: 2023-12-06 | End: 2023-12-08 | Stop reason: HOSPADM

## 2023-12-06 RX ORDER — POLYETHYLENE GLYCOL 3350 17 G/17G
17 POWDER, FOR SOLUTION ORAL DAILY PRN
Status: DISCONTINUED | OUTPATIENT
Start: 2023-12-06 | End: 2023-12-08 | Stop reason: HOSPADM

## 2023-12-06 RX ORDER — BENZONATATE 100 MG/1
100 CAPSULE ORAL 3 TIMES DAILY PRN
Status: DISCONTINUED | OUTPATIENT
Start: 2023-12-06 | End: 2023-12-08 | Stop reason: HOSPADM

## 2023-12-06 RX ORDER — DULOXETIN HYDROCHLORIDE 60 MG/1
60 CAPSULE, DELAYED RELEASE ORAL
Status: DISCONTINUED | OUTPATIENT
Start: 2023-12-06 | End: 2023-12-08 | Stop reason: HOSPADM

## 2023-12-06 RX ORDER — PREGABALIN 75 MG/1
300 CAPSULE ORAL 2 TIMES DAILY
Status: DISCONTINUED | OUTPATIENT
Start: 2023-12-06 | End: 2023-12-08 | Stop reason: HOSPADM

## 2023-12-06 RX ADMIN — WATER 2 ML: 1 INJECTION INTRAMUSCULAR; INTRAVENOUS; SUBCUTANEOUS at 22:50

## 2023-12-06 RX ADMIN — METHYLPREDNISOLONE SODIUM SUCCINATE 125 MG: 125 INJECTION INTRAMUSCULAR; INTRAVENOUS at 11:17

## 2023-12-06 RX ADMIN — GUAIFENESIN, DEXTROMETHORPHAN HBR 1 TABLET: 600; 30 TABLET ORAL at 21:22

## 2023-12-06 RX ADMIN — TRAZODONE HYDROCHLORIDE 100 MG: 50 TABLET ORAL at 22:50

## 2023-12-06 RX ADMIN — IPRATROPIUM BROMIDE AND ALBUTEROL SULFATE 1 DOSE: .5; 3 SOLUTION RESPIRATORY (INHALATION) at 15:30

## 2023-12-06 RX ADMIN — GUAIFENESIN, DEXTROMETHORPHAN HBR 1 TABLET: 600; 30 TABLET ORAL at 14:07

## 2023-12-06 RX ADMIN — SODIUM CHLORIDE, PRESERVATIVE FREE 10 ML: 5 INJECTION INTRAVENOUS at 21:21

## 2023-12-06 RX ADMIN — LEVOFLOXACIN 750 MG: 5 INJECTION, SOLUTION INTRAVENOUS at 11:20

## 2023-12-06 RX ADMIN — RIVAROXABAN 20 MG: 20 TABLET, FILM COATED ORAL at 16:27

## 2023-12-06 RX ADMIN — DULOXETINE HYDROCHLORIDE 60 MG: 60 CAPSULE, DELAYED RELEASE ORAL at 21:22

## 2023-12-06 RX ADMIN — IPRATROPIUM BROMIDE AND ALBUTEROL SULFATE 1 DOSE: .5; 3 SOLUTION RESPIRATORY (INHALATION) at 20:11

## 2023-12-06 RX ADMIN — METHYLPREDNISOLONE SODIUM SUCCINATE 60 MG: 125 INJECTION INTRAMUSCULAR; INTRAVENOUS at 22:50

## 2023-12-06 RX ADMIN — FAMOTIDINE 20 MG: 20 TABLET ORAL at 14:08

## 2023-12-06 RX ADMIN — CITALOPRAM HYDROBROMIDE 20 MG: 20 TABLET ORAL at 21:22

## 2023-12-06 RX ADMIN — PREGABALIN 300 MG: 75 CAPSULE ORAL at 21:22

## 2023-12-06 RX ADMIN — FAMOTIDINE 20 MG: 20 TABLET ORAL at 21:31

## 2023-12-06 RX ADMIN — POTASSIUM CHLORIDE 20 MEQ: 1500 TABLET, EXTENDED RELEASE ORAL at 21:22

## 2023-12-06 RX ADMIN — IPRATROPIUM BROMIDE AND ALBUTEROL SULFATE 1 DOSE: .5; 3 SOLUTION RESPIRATORY (INHALATION) at 10:44

## 2023-12-06 RX ADMIN — OXYCODONE HYDROCHLORIDE AND ACETAMINOPHEN 500 MG: 500 TABLET ORAL at 21:22

## 2023-12-06 ASSESSMENT — PAIN - FUNCTIONAL ASSESSMENT: PAIN_FUNCTIONAL_ASSESSMENT: 0-10

## 2023-12-06 ASSESSMENT — PAIN DESCRIPTION - PAIN TYPE: TYPE: CHRONIC PAIN

## 2023-12-06 ASSESSMENT — PAIN DESCRIPTION - LOCATION: LOCATION: LEG

## 2023-12-06 ASSESSMENT — PAIN DESCRIPTION - ORIENTATION: ORIENTATION: RIGHT;LEFT

## 2023-12-06 ASSESSMENT — PAIN DESCRIPTION - FREQUENCY: FREQUENCY: CONTINUOUS

## 2023-12-06 ASSESSMENT — PAIN SCALES - GENERAL: PAINLEVEL_OUTOF10: 8

## 2023-12-06 ASSESSMENT — PAIN DESCRIPTION - DESCRIPTORS: DESCRIPTORS: DISCOMFORT

## 2023-12-06 NOTE — H&P
History and Physical    Patient:  Joseph Barry  MRN: [id number]    Chief Complaint: Cough and shortness of breath    History Obtained From:  patient, electronic medical record    PCP: Gold Reyes MD    History of Present Illness: The patient is a 69 y.o. female who presented to the emergency room with increasing cough and shortness of breath. She stated her cough is productive with thick green phlegm. Symptoms began 1 week ago and was seen by her primary care on 12/1 and was placed on Augmentin and Zithromax at that time. Patient reported no improvement of symptoms and presented to the emergency room for evaluation. Patient is compliant with medications including nebulizer treatments. She has chronic hypoxia and on 2 to 3 L of oxygen per nasal cannula continuously. She denied fever or chills. She denied chest pain or palpitations. She denied dizziness or syncope. She denied any GI or  symptoms. She does have history of atrial fibrillation, CAD and recurrent pneumonia. Patient has had 6 admissions this year and 7 admissions last year for respiratory issues. During patient's evaluation she was COVID-negative. No leukocytosis. Chest x-ray showed multifocal pneumonia.     Past Medical History:        Diagnosis Date    A-fib [facility])     Anxiety     Atrial fibrillation (HCC)     Biventricular congestive heart failure (HCC)     Bronchiectasis with acute exacerbation ([address]) 04/29/2022    CAD (coronary artery disease)     Chronic pain syndrome     dilaudid infusion pump    COPD (chronic obstructive pulmonary disease) (HCC)     Depression     GERD (gastroesophageal reflux disease)     Hypothyroidism     Impaired fasting glucose     Iron deficiency anemia     Osteoporosis     Pulmonary fibrosis ([address]) 04/29/2022    Subdural hematoma ([address]) 08/23/2022       Past Surgical History:        Procedure Laterality Date    BACK SURGERY  09/09/1998    spinal cord stimulator    BACK SURGERY  08/04/1999    infusion pulmonary disease) ([address])  Resolved Problems:    * No resolved hospital problems.  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 ([address]) 08/23/2022    History of subdural hematoma 08/22/2022    Fall as cause of accidental injury in home as place of occurrence, initial encounter 08/22/2022    Pulmonary fibrosis ([address]) 04/29/2022    Dyspnea, unspecified 10/14/2021    GERD (gastroesophageal reflux disease) 03/02/2012    Citrobacter infection 08/09/2023    Dysphagia, oropharyngeal 08/07/2023    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    Acute on chronic respiratory failure with hypoxia and hypercapnia (HCC) 08/05/2023    Biventricular heart failure ([address]) 08/04/2023    Bronchiectasis ([address]) 08/04/2023    Malnutrition of mild degree ([address]) 08/04/2023    Multifocal pneumonia 08/04/2023    Community acquired bacterial pneumonia 08/02/2023    COPD exacerbation ([address]) 08/02/2023    Acute respiratory failure with hypoxia and hypercapnia ([address]) 06/29/2023    A-fib ([address]) 02/01/2022    Anemia 10/14/2021    Biventricular congestive heart failure (HCC)     COPD (chronic obstructive pulmonary disease) ([address]) 10/22/2020    Septic shock ([address]) 08/16/2020    Hypothyroidism 10/05/2018    Major depressive disorder 10/05/2018    Chronic midline low back pain without sciatica 10/05/2018    Iron deficiency anemia 10/05/2018    Chronic pain 05/05/2017           Plan:     MEDICAL DECISION MAKING:    Primary Problem(s): Multifocal pneumonia  Differential diagnoses: Viral pneumonia, COPD exacerbation  Condition is an undiagnosed new problem with uncertain prognosis  Condition is stable  Treatment plan:   COVID-negative  Influenza A/B-pending  Viral panel-pending  Blood culture x 2-pending  Up to chair with meals  Acapella  Imaging: no further imaging studies ordered today  Medications:   Continue

## 2023-12-06 NOTE — PROGRESS NOTES
Occupational Therapy  Facility/Department: [facility] AT THE [facility] MED SURG  Occupational Therapy Initial Assessment    Name: Yael Luz  : 1945  MRN: [id number]  Date of Service: 2023    Discharge Recommendations:  Continue to assess pending progress        Patient Diagnosis(es): The primary encounter diagnosis was COPD exacerbation ([address] A diagnosis of Pneumonia of both lungs due to infectious organism, unspecified part of lung was also pertinent to this visit. Past Medical History:  has a past medical history of A-fib ([address]), Anxiety, Atrial fibrillation (HCC), Biventricular congestive heart failure ([address]), Bronchiectasis with acute exacerbation ([address]), CAD (coronary artery disease), Chronic pain syndrome, COPD (chronic obstructive pulmonary disease) ([address]), Depression, GERD (gastroesophageal reflux disease), Hypothyroidism, Impaired fasting glucose, Iron deficiency anemia, Osteoporosis, Pulmonary fibrosis ([address]), and Subdural hematoma ([address]). Past Surgical History:  has a past surgical history that includes Hysterectomy (1991); Carpal tunnel release (Right, 1999); Total knee arthroplasty (Right, 2021); fracture surgery (Left, 2018); Wrist fracture surgery (Left, 2018); lumbar discectomy (1993); Lumbar disc surgery (02/15/1994); back surgery (1998); back surgery (1999); Carpal tunnel release (Left, 1999); Neck surgery (2002); Carpal tunnel release (Right, 2002); Carpal tunnel release (Left, 2003); back surgery (10/23/2003); back surgery (10/23/2003); Cervical disc surgery (10/25/2004); Cervical disc surgery (2004); Neck surgery (2005); Toe amputation (10/08/2006); Toe amputation (2008); lumbar laminectomy (2008); Toe amputation (10/03/2008); Humerus fracture surgery (Right, 10/03/2010); Knee arthroscopy (Right, 2011); back surgery (2017); knee surgery (Right, 2016);  Wrist fracture surgery (Left,

## 2023-12-06 NOTE — PROGRESS NOTES
Spiritual Services Interventions  02/02 12/6/2023  Britni Diallo Right Muñozerman  69y.o. year old female    Encounter Summary  Encounter Overview/Reason : (P) Crisis  Service Provided For[de-identified] (P) Patient and family together  Referral/Consult From[de-identified] (P) Rounding  Support System: (P) Spouse, Children, Friends/neighbors  Last Encounter : (P) 12/06/23 (ED)  Complexity of Encounter: (P) Moderate  Begin Time: (P) 1020  End Time : (P) 1035  Total Time Calculated: (P) 15 min  Crisis  Type: (P)  (general)                       Assessment/Intervention/Outcome  Assessment: (P) Calm  Intervention: (P) Active listening, Explored/Affirmed feelings, thoughts, concerns, Nurtured Hope, Prayer (assurance of)/[place], Other (Comment) (hospitality)  Outcome: (P) Engaged in conversation, Expressed feelings, needs, and concerns, Expressed Gratitude

## 2023-12-06 NOTE — ED NOTES
[facility] [address] [facility] [place], [place]  12/06/23 3146

## 2023-12-06 NOTE — ED PROVIDER NOTES
[address] [facility] ED  EMERGENCY DEPARTMENT ENCOUNTER      Pt Name: Delonte Henderson  MRN: [id number]  [address] [facility] [place] 1945  Date of evaluation: 12/6/2023  Provider: Xi Stockton MD  Time Note started  9:53 AM EST  12/6/23           NURSING NOTES REVIEWED     Pt evaluated in a timely manner      CURRENT MEDICATIONS       Previous Medications    ALBUTEROL SULFATE HFA (VENTOLIN HFA) 108 (90 BASE) MCG/ACT INHALER    Inhale 2 puffs into the lungs 4 times daily as needed for Wheezing    ALENDRONATE (FOSAMAX) 70 MG TABLET    Take 1 tablet by mouth every 7 days On Sundays    AMOXICILLIN-CLAVULANATE (AUGMENTIN) 875-125 MG PER TABLET    Take 1 tablet by mouth 2 times daily for 10 days    ASPIRIN 81 PO    Take 81 mg by mouth daily    AZITHROMYCIN (ZITHROMAX) 250 MG TABLET    Take 1 tablet by mouth See Admin Instructions for 5 days 500mg on day 1 followed by 250mg on days 2 - 5    BENZONATATE (TESSALON) 100 MG CAPSULE    Take 1 capsule by mouth 3 times daily as needed for Cough    BUMETANIDE (BUMEX) 2 MG TABLET    take 1 tablet by mouth once daily    CALCIUM CITRATE-VITAMIN D (CITRACAL+D) 315-5 MG-MCG TABS PER TABLET    Take 1 tablet by mouth daily    CITALOPRAM (CELEXA) 20 MG TABLET    Take 1 tablet by mouth daily    DULOXETINE (CYMBALTA) 60 MG EXTENDED RELEASE CAPSULE    Take 1 capsule by mouth daily    FUROSEMIDE (LASIX) 40 MG TABLET    Take 1 tablet by mouth daily as needed    GUAIFENESIN (MUCINEX) 600 MG EXTENDED RELEASE TABLET    Take 1 tablet by mouth 2 times daily    HYDROCODONE-ACETAMINOPHEN (NORCO)  MG PER TABLET    take 1 tablet by mouth every 8 hours if needed for pain for up to 30 DAYS    IPRATROPIUM-ALBUTEROL (DUONEB) 0.5-2.5 (3) MG/3ML SOLN NEBULIZER SOLUTION    Take 3 mLs by nebulization every 4 hours as needed for Shortness of Breath or Wheezing    LEVOTHYROXINE (SYNTHROID) 200 MCG TABLET    TAKE 1 TABLET BY MOUTH  DAILY    MENTHOL-ZINC OXIDE (CALMOSEPTINE) 0.44-20.625 % OINT OINTMENT    Apply lungs bilaterally grossly unchanged from   prior study dated 08/03/2023 likely reflecting persistent multifocal   pneumonia. Given persistence, chest CT may be obtained for further   evaluation. Laboratory studies  Labs Reviewed   CBC WITH AUTO DIFFERENTIAL - Abnormal; Notable for the following components:       Result Value    Neutrophils % 83 (*)     Lymphocytes % 10 (*)     Eosinophils % 0 (*)     Lymphocytes Absolute 0.72 (*)     All other components within normal limits   COMPREHENSIVE METABOLIC PANEL - Abnormal; Notable for the following components:    Glucose 106 (*)     Bun/Cre Ratio 28 (*)     All other components within normal limits   BRAIN NATRIURETIC PEPTIDE - Abnormal; Notable for the following components:    Pro- (*)     All other components within normal limits   TROPONIN - Abnormal; Notable for the following components:    Troponin, High Sensitivity 18 (*)     All other components within normal limits   BLOOD GAS, VENOUS - Abnormal; Notable for the following components:    pO2, Micheal 51.6 (*)     Positive Base Excess, Micheal 2.1 (*)     O2 Sat, Micheal 86.2 (*)     All other components within normal limits   COVID-19, RAPID   CULTURE, BLOOD 2   CULTURE, BLOOD 1   LACTIC ACID       EKG, Imaging and Labs have lucio reviewed and discussed in depth with the patient.     Tests considered but not ordered and why: CT considered however not indicated on emergency basis      SCREENINGS            [place] Coma Scale  Eye Opening: Spontaneous  Best Verbal Response: Oriented  Best Motor Response: Obeys commands  Kristy Coma Scale Score: 15                           Decision Rules/Scores/MIPS utilized:      (Heart, NIH, [place], [place], [place], [place], nexus, sirs/sepsis and GCS, etc.) 2 SIRS criteria are met    Consultations:  - Physician -consultation undertaken with  agreeable with admission  - Pharmacists consultation undertaken with fozia Benson concerning antibiotic selection recommends

## 2023-12-06 NOTE — PROGRESS NOTES
Patient arrived to floor via wheelchair, vitals and assessment completed. Call light in reach.  Chair alarm on

## 2023-12-06 NOTE — CARE COORDINATION
Case Management Assessment  Initial Evaluation    Date/Time of Evaluation: 12/6/2023 2:07 PM  Assessment Completed by: BUZZ Mayer    If patient is discharged prior to next notation, then this note serves as note for discharge by case management. Patient Name: Salena Dunne                   YOB: 1945  Diagnosis: COPD exacerbation ([address]) [J44.1]  Pneumonia of both lungs due to infectious organism, unspecified part of lung [J18.9]  Multifocal pneumonia [J18.9]                   Date / Time: 12/6/2023  9:37 AM    Patient Admission Status: Inpatient   Readmission Risk (Low < 19, Mod (19-27), High > 27): Readmission Risk Score: 25.9    Current PCP: Jose Adler MD  PCP verified by CM? Yes    Chart Reviewed: Yes      History Provided by: Patient  Patient Orientation: Alert and Oriented, Person, Place, Situation, Self    Patient Cognition: Alert    Hospitalization in the last 30 days (Readmission):  No    If yes, Readmission Assessment in  Navigator will be completed. Advance Directives:      Code Status: Full Code   Patient's Primary Decision Maker is: Legal Next of Kin    Primary Decision Maker (Active): Radha Mcmanus Spouse - [phone]    Secondary Decision Maker: Marlin Grant Child - [phone]    Supplemental (Other) Decision Maker: Myrna Martinez Child - [phone]    Discharge Planning:    Patient lives with: Spouse/Significant Other Type of Home: House  Primary Care Giver: Self  Patient Support Systems include: Spouse/Significant Other, Children, Family Members   Current Financial resources: Medicare  Current community resources: None  Current services prior to admission: Durable Medical Equipment, Oxygen Therapy            Current DME: Oxygen Therapy (Comment), Shower Chair, Delmas Gaucher, Wheelchair (chest vest, & grab bars)            Type of Home Care services:  None    ADLS  Prior functional level:  Independent in ADLs/IADLs  Current functional level:

## 2023-12-06 NOTE — PROGRESS NOTES
Physical Therapy  Facility/Department: [facility] AT THE [facility] MED SURG  Physical Therapy Initial Assessment    Name: Clementina Toney  : 1945  MRN: [id number]  Date of Service: 2023    Discharge Recommendations:  Continue to assess pending progress, Subacute/Skilled Nursing Facility, Home with Home health PT, 24 hour supervision or assist   PT Equipment Recommendations  Equipment Needed: No      Patient Diagnosis(es): The primary encounter diagnosis was COPD exacerbation ([address] A diagnosis of Pneumonia of both lungs due to infectious organism, unspecified part of lung was also pertinent to this visit. Past Medical History:  has a past medical history of A-fib ([address]), Anxiety, Atrial fibrillation (HCC), Biventricular congestive heart failure ([address]), Bronchiectasis with acute exacerbation ([address]), CAD (coronary artery disease), Chronic pain syndrome, COPD (chronic obstructive pulmonary disease) ([address]), Depression, GERD (gastroesophageal reflux disease), Hypothyroidism, Impaired fasting glucose, Iron deficiency anemia, Osteoporosis, Pulmonary fibrosis ([address]), and Subdural hematoma ([address]). Past Surgical History:  has a past surgical history that includes Hysterectomy (1991); Carpal tunnel release (Right, 1999); Total knee arthroplasty (Right, 2021); fracture surgery (Left, 2018); Wrist fracture surgery (Left, 2018); lumbar discectomy (1993); Lumbar disc surgery (02/15/1994); back surgery (1998); back surgery (1999); Carpal tunnel release (Left, 1999); Neck surgery (2002); Carpal tunnel release (Right, 2002); Carpal tunnel release (Left, 2003); back surgery (10/23/2003); back surgery (10/23/2003); Cervical disc surgery (10/25/2004); Cervical disc surgery (2004); Neck surgery (2005); Toe amputation (10/08/2006); Toe amputation (2008); lumbar laminectomy (2008); Toe amputation (10/03/2008);  Humerus fracture surgery (Right, 10/03/2010);

## 2023-12-07 LAB
ANION GAP SERPL CALCULATED.3IONS-SCNC: 10 MMOL/L (ref 9–17)
B PARAP IS1001 DNA NPH QL NAA+NON-PROBE: NOT DETECTED
B PERT DNA SPEC QL NAA+PROBE: NOT DETECTED
BASOPHILS # BLD: <0.03 K/UL (ref 0–0.2)
BASOPHILS NFR BLD: 0 % (ref 0–2)
BUN SERPL-MCNC: 12 MG/DL (ref 8–23)
BUN/CREAT SERPL: 24 (ref 9–20)
C PNEUM DNA NPH QL NAA+NON-PROBE: NOT DETECTED
CALCIUM SERPL-MCNC: 9.9 MG/DL (ref 8.6–10.4)
CHLORIDE SERPL-SCNC: 100 MMOL/L (ref 98–107)
CO2 SERPL-SCNC: 28 MMOL/L (ref 20–31)
CREAT SERPL-MCNC: 0.5 MG/DL (ref 0.5–0.9)
EOSINOPHIL # BLD: <0.03 K/UL (ref 0–0.44)
EOSINOPHILS RELATIVE PERCENT: 0 % (ref 1–4)
ERYTHROCYTE [DISTWIDTH] IN BLOOD BY AUTOMATED COUNT: 14.2 % (ref 11.8–14.4)
FLUAV RNA NPH QL NAA+NON-PROBE: NOT DETECTED
FLUBV RNA NPH QL NAA+NON-PROBE: NOT DETECTED
GFR SERPL CREATININE-BSD FRML MDRD: >60 ML/MIN/1.73M2
GLUCOSE SERPL-MCNC: 145 MG/DL (ref 70–99)
HADV DNA NPH QL NAA+NON-PROBE: NOT DETECTED
HCOV 229E RNA NPH QL NAA+NON-PROBE: NOT DETECTED
HCOV HKU1 RNA NPH QL NAA+NON-PROBE: NOT DETECTED
HCOV NL63 RNA NPH QL NAA+NON-PROBE: NOT DETECTED
HCOV OC43 RNA NPH QL NAA+NON-PROBE: NOT DETECTED
HCT VFR BLD AUTO: 43.9 % (ref 36.3–47.1)
HGB BLD-MCNC: 14.4 G/DL (ref 11.9–15.1)
HMPV RNA NPH QL NAA+NON-PROBE: NOT DETECTED
HPIV1 RNA NPH QL NAA+NON-PROBE: NOT DETECTED
HPIV2 RNA NPH QL NAA+NON-PROBE: NOT DETECTED
HPIV3 RNA NPH QL NAA+NON-PROBE: NOT DETECTED
HPIV4 RNA NPH QL NAA+NON-PROBE: NOT DETECTED
IMM GRANULOCYTES # BLD AUTO: <0.03 K/UL (ref 0–0.3)
IMM GRANULOCYTES NFR BLD: 0 %
LYMPHOCYTES NFR BLD: 0.52 K/UL (ref 1.1–3.7)
LYMPHOCYTES RELATIVE PERCENT: 11 % (ref 24–43)
M PNEUMO DNA NPH QL NAA+NON-PROBE: NOT DETECTED
MCH RBC QN AUTO: 30.7 PG (ref 25.2–33.5)
MCHC RBC AUTO-ENTMCNC: 32.8 G/DL (ref 28.4–34.8)
MCV RBC AUTO: 93.6 FL (ref 82.6–102.9)
MONOCYTES NFR BLD: 0.14 K/UL (ref 0.1–1.2)
MONOCYTES NFR BLD: 3 % (ref 3–12)
NEUTROPHILS NFR BLD: 86 % (ref 36–65)
NEUTS SEG NFR BLD: 4.06 K/UL (ref 1.5–8.1)
NRBC BLD-RTO: 0 PER 100 WBC
PLATELET # BLD AUTO: 216 K/UL (ref 138–453)
PMV BLD AUTO: 9.6 FL (ref 8.1–13.5)
POTASSIUM SERPL-SCNC: 4.3 MMOL/L (ref 3.7–5.3)
PROCALCITONIN SERPL-MCNC: 0.5 NG/ML
RBC # BLD AUTO: 4.69 M/UL (ref 3.95–5.11)
RSV RNA NPH QL NAA+NON-PROBE: DETECTED
RV+EV RNA NPH QL NAA+NON-PROBE: NOT DETECTED
SARS-COV-2 RNA NPH QL NAA+NON-PROBE: NOT DETECTED
SODIUM SERPL-SCNC: 138 MMOL/L (ref 135–144)
SPECIMEN DESCRIPTION: ABNORMAL
WBC OTHER # BLD: 4.7 K/UL (ref 3.5–11.3)

## 2023-12-07 PROCEDURE — 36415 COLL VENOUS BLD VENIPUNCTURE: CPT

## 2023-12-07 PROCEDURE — 80048 BASIC METABOLIC PNL TOTAL CA: CPT

## 2023-12-07 PROCEDURE — 97535 SELF CARE MNGMENT TRAINING: CPT

## 2023-12-07 PROCEDURE — 94761 N-INVAS EAR/PLS OXIMETRY MLT: CPT

## 2023-12-07 PROCEDURE — 94669 MECHANICAL CHEST WALL OSCILL: CPT

## 2023-12-07 PROCEDURE — 94664 DEMO&/EVAL PT USE INHALER: CPT

## 2023-12-07 PROCEDURE — 85025 COMPLETE CBC W/AUTO DIFF WBC: CPT

## 2023-12-07 PROCEDURE — 6370000000 HC RX 637 (ALT 250 FOR IP): Performed by: INTERNAL MEDICINE

## 2023-12-07 PROCEDURE — 97110 THERAPEUTIC EXERCISES: CPT

## 2023-12-07 PROCEDURE — 6370000000 HC RX 637 (ALT 250 FOR IP): Performed by: NURSE PRACTITIONER

## 2023-12-07 PROCEDURE — 6360000002 HC RX W HCPCS: Performed by: NURSE PRACTITIONER

## 2023-12-07 PROCEDURE — 2580000003 HC RX 258: Performed by: NURSE PRACTITIONER

## 2023-12-07 PROCEDURE — 2700000000 HC OXYGEN THERAPY PER DAY

## 2023-12-07 PROCEDURE — 94640 AIRWAY INHALATION TREATMENT: CPT

## 2023-12-07 PROCEDURE — 1200000000 HC SEMI PRIVATE

## 2023-12-07 PROCEDURE — 97116 GAIT TRAINING THERAPY: CPT

## 2023-12-07 RX ORDER — ALBUTEROL SULFATE 2.5 MG/3ML
2.5 SOLUTION RESPIRATORY (INHALATION)
Status: DISCONTINUED | OUTPATIENT
Start: 2023-12-07 | End: 2023-12-08 | Stop reason: HOSPADM

## 2023-12-07 RX ORDER — IPRATROPIUM BROMIDE AND ALBUTEROL SULFATE 2.5; .5 MG/3ML; MG/3ML
1 SOLUTION RESPIRATORY (INHALATION) EVERY 4 HOURS
Status: DISCONTINUED | OUTPATIENT
Start: 2023-12-07 | End: 2023-12-08 | Stop reason: HOSPADM

## 2023-12-07 RX ORDER — WATER 10 ML/10ML
INJECTION INTRAMUSCULAR; INTRAVENOUS; SUBCUTANEOUS
Status: DISPENSED
Start: 2023-12-07 | End: 2023-12-07

## 2023-12-07 RX ADMIN — PREGABALIN 300 MG: 75 CAPSULE ORAL at 20:48

## 2023-12-07 RX ADMIN — METHYLPREDNISOLONE SODIUM SUCCINATE 60 MG: 125 INJECTION INTRAMUSCULAR; INTRAVENOUS at 22:50

## 2023-12-07 RX ADMIN — IPRATROPIUM BROMIDE AND ALBUTEROL SULFATE 1 DOSE: .5; 3 SOLUTION RESPIRATORY (INHALATION) at 15:51

## 2023-12-07 RX ADMIN — HYDROCODONE BITARTRATE AND ACETAMINOPHEN 1 TABLET: 10; 325 TABLET ORAL at 03:07

## 2023-12-07 RX ADMIN — MULTIPLE VITAMINS W/ MINERALS TAB 1 TABLET: TAB at 08:55

## 2023-12-07 RX ADMIN — POTASSIUM CHLORIDE 20 MEQ: 1500 TABLET, EXTENDED RELEASE ORAL at 08:55

## 2023-12-07 RX ADMIN — OXYCODONE HYDROCHLORIDE AND ACETAMINOPHEN 500 MG: 500 TABLET ORAL at 08:55

## 2023-12-07 RX ADMIN — IPRATROPIUM BROMIDE AND ALBUTEROL SULFATE 1 DOSE: .5; 3 SOLUTION RESPIRATORY (INHALATION) at 19:54

## 2023-12-07 RX ADMIN — TRAZODONE HYDROCHLORIDE 100 MG: 50 TABLET ORAL at 20:38

## 2023-12-07 RX ADMIN — FAMOTIDINE 20 MG: 20 TABLET ORAL at 08:55

## 2023-12-07 RX ADMIN — CITALOPRAM HYDROBROMIDE 20 MG: 20 TABLET ORAL at 20:38

## 2023-12-07 RX ADMIN — LEVOTHYROXINE SODIUM 200 MCG: 100 TABLET ORAL at 08:54

## 2023-12-07 RX ADMIN — OXYCODONE HYDROCHLORIDE AND ACETAMINOPHEN 500 MG: 500 TABLET ORAL at 20:38

## 2023-12-07 RX ADMIN — SODIUM CHLORIDE, PRESERVATIVE FREE 10 ML: 5 INJECTION INTRAVENOUS at 20:40

## 2023-12-07 RX ADMIN — IPRATROPIUM BROMIDE AND ALBUTEROL SULFATE 1 DOSE: .5; 3 SOLUTION RESPIRATORY (INHALATION) at 23:39

## 2023-12-07 RX ADMIN — PANTOPRAZOLE SODIUM 40 MG: 40 TABLET, DELAYED RELEASE ORAL at 08:54

## 2023-12-07 RX ADMIN — SODIUM CHLORIDE, PRESERVATIVE FREE 10 ML: 5 INJECTION INTRAVENOUS at 08:55

## 2023-12-07 RX ADMIN — PREGABALIN 300 MG: 75 CAPSULE ORAL at 08:54

## 2023-12-07 RX ADMIN — CALCIUM CARBONATE-VITAMIN D TAB 500 MG-200 UNIT 1 TABLET: 500-200 TAB at 08:54

## 2023-12-07 RX ADMIN — IPRATROPIUM BROMIDE AND ALBUTEROL SULFATE 1 DOSE: .5; 3 SOLUTION RESPIRATORY (INHALATION) at 10:37

## 2023-12-07 RX ADMIN — ASPIRIN 81 MG: 81 TABLET, COATED ORAL at 08:54

## 2023-12-07 RX ADMIN — GUAIFENESIN, DEXTROMETHORPHAN HBR 1 TABLET: 600; 30 TABLET ORAL at 20:38

## 2023-12-07 RX ADMIN — METHYLPREDNISOLONE SODIUM SUCCINATE 60 MG: 125 INJECTION INTRAMUSCULAR; INTRAVENOUS at 11:04

## 2023-12-07 RX ADMIN — RIVAROXABAN 20 MG: 20 TABLET, FILM COATED ORAL at 08:55

## 2023-12-07 RX ADMIN — LEVOFLOXACIN 750 MG: 750 TABLET, FILM COATED ORAL at 11:04

## 2023-12-07 RX ADMIN — SERTRALINE HYDROCHLORIDE 50 MG: 50 TABLET ORAL at 08:55

## 2023-12-07 RX ADMIN — GUAIFENESIN, DEXTROMETHORPHAN HBR 1 TABLET: 600; 30 TABLET ORAL at 08:54

## 2023-12-07 RX ADMIN — DULOXETINE HYDROCHLORIDE 60 MG: 60 CAPSULE, DELAYED RELEASE ORAL at 20:38

## 2023-12-07 RX ADMIN — IPRATROPIUM BROMIDE AND ALBUTEROL SULFATE 1 DOSE: .5; 3 SOLUTION RESPIRATORY (INHALATION) at 05:36

## 2023-12-07 RX ADMIN — POTASSIUM CHLORIDE 20 MEQ: 1500 TABLET, EXTENDED RELEASE ORAL at 20:38

## 2023-12-07 ASSESSMENT — PAIN DESCRIPTION - LOCATION: LOCATION: LEG

## 2023-12-07 ASSESSMENT — PAIN DESCRIPTION - FREQUENCY: FREQUENCY: CONTINUOUS

## 2023-12-07 ASSESSMENT — PAIN SCALES - GENERAL
PAINLEVEL_OUTOF10: 8
PAINLEVEL_OUTOF10: 0

## 2023-12-07 ASSESSMENT — PAIN DESCRIPTION - ORIENTATION: ORIENTATION: RIGHT;LEFT

## 2023-12-07 ASSESSMENT — PAIN DESCRIPTION - DESCRIPTORS: DESCRIPTORS: ACHING

## 2023-12-07 ASSESSMENT — PAIN DESCRIPTION - ONSET: ONSET: ON-GOING

## 2023-12-07 ASSESSMENT — PAIN DESCRIPTION - PAIN TYPE: TYPE: ACUTE PAIN

## 2023-12-07 ASSESSMENT — PAIN - FUNCTIONAL ASSESSMENT: PAIN_FUNCTIONAL_ASSESSMENT: ACTIVITIES ARE NOT PREVENTED

## 2023-12-07 NOTE — PROGRESS NOTES
Comprehensive Nutrition Assessment    Type and Reason for Visit:  Initial    Nutrition Recommendations/Plan:   Continue current diet. Start 4 oz vanilla ensure enlive TID with meals. Malnutrition Assessment:  Malnutrition Status:  Mild malnutrition (12/07/23 0908)    Context:  Acute Illness     Findings of the 6 clinical characteristics of malnutrition:  Energy Intake:  No significant decrease in energy intake  Weight Loss:  Greater than 2% over 1 week (2.7% x 6 days)     Body Fat Loss:  No significant body fat loss     Muscle Mass Loss:  No significant muscle mass loss    Fluid Accumulation:  No significant fluid accumulation     Strength:  Not Performed    Nutrition Assessment:    Mild malnutrition r/t inadequate protein/energy intakes aeb weight loss 2.7% x 1 week. Pt denied any PO changes, she is unsure why she lost weight. Pt states she likes \"her salt\" and would like to get off of the heart healthy diet. Pt did not eat her breakfat and had her  bring her in something to eat. She is agreeable to start 4 oz vanilla ensure enlive TID with meals. Denied any meal ingestion issues. Pt with COPD, pneumonia, and RSV. Pt states her  \"is an excellent cook and I am used to his food. \" Typically has 2 meals per day, sometimes has fruit and a sandwich for lunch. Fruit typically at all 3 meals. Nutrition Related Findings:    no malnutrition indices Wound Type: None       Current Nutrition Intake & Therapies:    Average Meal Intake: Unable to assess  Average Supplements Intake: None Ordered  ADULT DIET; Regular; Low Fat/Low Chol/High Fiber/2 gm Na    Anthropometric Measures:  Height: 165.1 cm (5' 5\")  Ideal Body Weight (IBW): 125 lbs (57 kg)    Admission Body Weight: 65.2 kg (143 lb 12.8 oz)  Current Body Weight: 65.2 kg (143 lb 12.8 oz), 115 % IBW.  Weight Source: Bed Scale  Current BMI (kg/m2): 23.9  Usual Body Weight: 67.1 kg (148 lb)  % Weight Change (Calculated): -2.8  Weight Adjustment For: No Adjustment                 BMI Categories: Normal Weight (BMI 18.5-24. 9)    Estimated Daily Nutrient Needs:  Energy Requirements Based On: Kcal/kg  Weight Used for Energy Requirements: Current  Energy (kcal/day): 6916-7970 (30-33/kg)  Weight Used for Protein Requirements: Current  Protein (g/day): 85-98g (1.3-1.5g/kg)  Method Used for Fluid Requirements: 1 ml/kcal  Fluid (ml/day): 1,900 ml (30/kg)  Hematology:  Recent Labs     12/06/23  0958 12/07/23  0603   WBC 7.0 4.7   HGB 13.1 14.4   HCT 40.9 43.9     Chemistry:  Recent Labs     12/06/23  0958 12/07/23  0603    138   K 3.8 4.3   CL 98 100   CO2 28 28   GLUCOSE 106* 145*   BUN 14 12   CREATININE 0.5 0.5   CALCIUM 9.3 9.9     Recent Labs     12/06/23  0958   PROT 7.2   LABALBU 4.0   AST 18   ALT 15   ALKPHOS 71   BILITOT 0.4         Nutrition Diagnosis:   Other (Comment) (mild malnutrition) related to inadequate protein-energy intake as evidenced by weight loss greater than or equal to 2% in 1 week    Nutrition Interventions:   Food and/or Nutrient Delivery: Continue Current Diet, Start Oral Nutrition Supplement  Nutrition Education/Counseling: No recommendation at this time  Coordination of Nutrition Care: Continue to monitor while inpatient  Plan of Care discussed with: Patient    Goals:     Goals: Meet at least 75% of estimated needs       Nutrition Monitoring and Evaluation:   Behavioral-Environmental Outcomes: Readiness for Change  Food/Nutrient Intake Outcomes: Food and Nutrient Intake, Supplement Intake  Physical Signs/Symptoms Outcomes: Biochemical Data, Weight    Discharge Planning:    Continue current diet, Continue Oral Nutrition Supplement     Flor Shah, [address] [facility],   Contact: [phone]

## 2023-12-07 NOTE — PROGRESS NOTES
Vitals rechecked and reassessment complete at this time. See flowsheets for details. Patient is sitting up in the chair visiting with their spouse. No distress noted. Ice water given to the patient. Patient denies further needs. Care ongoing.

## 2023-12-07 NOTE — PROGRESS NOTES
Limits     Objective   Bed Mobility Training  Bed Mobility Training: No  Transfer Training  Transfer Training: Yes  Overall Level of Assistance: Contact-guard assistance;Stand-by assistance;Assist X1  Interventions: Safety awareness training; Tactile cues; Verbal cues  Sit to Stand: Contact-guard assistance  Stand to Sit: Stand-by assistance;Assist X1  Stand Pivot Transfers: Contact-guard assistance;Assist X1;Stand-by assistance  Toilet Transfer: Stand-by assistance;Contact-guard assistance;Assist X1  Gait Training  Gait Training: Yes  Gait  Overall Level of Assistance: Contact-guard assistance;Assist X1  Distance (ft): 50 Feet (1x 10' and additional 36' with occasional short standing stops while conversing with nurse)  Assistive Device: Walker, rolling;Gait belt  Interventions: Verbal cues; Safety awareness training; Tactile cues  Speed/Helen: Shuffled; Accelerated  Gait Abnormalities: Shuffling gait  Neuromuscular Education  Neuromuscular Education: No  PT Exercises  Exercise Treatment: Seated B LE ther ex x 20 in all planes, 2 rest breaks during, cues on slowing pace, fair carryover. Safety Devices  Type of Devices: All fall risk precautions in place;Call light within reach; Chair alarm in place; Left in chair;Nurse notified; Patient at risk for falls;Gait belt       Goals  Short Term Goals  Time Frame for Short Term Goals: 20 days  Short Term Goal 1: Pt will demonstrate supine to and from sit on edge of bed at supervision at most in order to improve safety with bed mobility  Short Term Goal 2: Pt will tolerate 5 minutes of standing activity without loss of balance in order to improve tolerance to daily tasks  Short Term Goal 3: Pt will ambulate 50 feet with least restrictive assistive device at SBA at most in order to improve safety with home ambulation  Patient Goals   Patient Goals : go home    Education  Patient Education  Education Given To: Patient  Education Provided Comments: Education provided on slowing pace during ther ex for improved technique and energy conservation. Education during transfers with use of 2WW for improved stability, patient was unsteady with hurry cane, patient reports she does use 2WW and 4WW at home at times, patient agreeable to using 2WW while here for increased stability.   Education Method: Verbal;Demonstration  Barriers to Learning: None  Education Outcome: Verbalized understanding;Continued education needed;Demonstrated understanding      Therapy Time   Individual Concurrent Group Co-treatment   Time In 0840         Time Out 0910         Minutes [address] [place], [place]

## 2023-12-07 NOTE — PROGRESS NOTES
Writer at bedside at this time to perform shift assessment. Patient is sitting up in the chair at this time. Vital signs taken and assessment completed at this time. Patient is alert and oriented and has no complaints of pain at this time. Patient denies any further needs at this time. Call light within reach, chair alarm on for safety, care ongoing.

## 2023-12-07 NOTE — PROGRESS NOTES
Occupational Therapy  Facility/Department: [facility] AT THE [facility] MED SURG  Daily Treatment Note  NAME: Mag Arguelles  : 1945  MRN: [id number]    Date of Service: 2023    Discharge Recommendations:  Continue to assess pending progress         Patient Diagnosis(es): The primary encounter diagnosis was COPD exacerbation ([address] A diagnosis of Pneumonia of both lungs due to infectious organism, unspecified part of lung was also pertinent to this visit. Assessment    Activity Tolerance: Patient tolerated treatment well (Pt reported SOB and faitgue post self care)  Discharge Recommendations: Continue to assess pending progress      Plan   Occupational Therapy Plan  Times Per Day: Once a day  Days Per Week: 7 Days  Current Treatment Recommendations: Strengthening;ROM; Functional mobility training; Safety education & training;Patient/Caregiver education & training;Equipment evaluation, education, & procurement;Self-Care / ADL     Restrictions   General, fall risk, droplet/contact precautions    Subjective   Subjective  Subjective: Pt in restroom washing up sinkside with nursing. PARSON took over as tx. Pt  reports \"She does pretty good at home. She's stiff in the Am but she gets it all done. \"  Pain: denied  Orientation  Overall Orientation Status: Within Functional Limits  Pain: no c/o pain currently. Objective    Vitals       ADL  Grooming: Modified independent   UE Bathing: Modified independent   LE Bathing: Modified independent   UE Dressing: Modified independent   LE Dressing: Modified independent   Toileting: Modified independent   Functional Mobility Skilled Clinical Factors: SUP/MOD I using RW normal household distances. Pt became tangled in O2 tubing however was able to correct without LOB. Pt demo'd ~ 15 min dynamic standing during ADLs unsupported with 0 LOB noted. Pt dropped items and was able to retrieve without LOB from floor level. Safety Devices  Type of Devices:  All fall risk

## 2023-12-07 NOTE — PROGRESS NOTES
Physical Therapy  Facility/Department: [facility] AT THE [facility] MED SURG  Daily Treatment Note  NAME: Linnette Brown  : 1945  MRN: [id number]    Date of Service: 2023    Discharge Recommendations:  Continue to assess pending progress, Subacute/Skilled Nursing Facility, Home with Home health PT, 24 hour supervision or assist     Patient Diagnosis(es): The primary encounter diagnosis was COPD exacerbation ([address] A diagnosis of Pneumonia of both lungs due to infectious organism, unspecified part of lung was also pertinent to this visit. Assessment   Assessment: Pt. able to ambulate 60ftx1,40ftx1 with WW< SBA for safety. Transfers:SBA. Good standing dynamic balance. Seated exercises B LE x20. STS x10  Activity Tolerance: Patient tolerated treatment well     Plan    Physical Therapy Plan  General Plan: 2 times a day 7 days a week  Current Treatment Recommendations: Strengthening;ROM;Balance training; Endurance training;Functional mobility training;Transfer training;Neuromuscular re-education;Gait training;Home exercise program;Stair training;Positioning; Therapeutic activities; Patient/Caregiver education & training     Restrictions  Restrictions/Precautions  Restrictions/Precautions: Isolation (droplet)  Required Braces or Orthoses?: No     Subjective    Subjective  Subjective: PT. in chair upon arrival with  present, agreeable to therapy at this time.   Pain: buttock pain from sore  Orientation  Overall Orientation Status: Within Functional Limits     Objective   Bed Mobility Training  Bed Mobility Training: No  Transfer Training  Transfer Training: Yes  Overall Level of Assistance: Stand-by assistance;Assist X1  Interventions: Verbal cues  Sit to Stand: Stand-by assistance;Assist X1  Stand to Sit: Stand-by assistance;Assist X1  Stand Pivot Transfers: Contact-guard assistance;Assist X1;Stand-by assistance  Toilet Transfer: Stand-by assistance;Contact-guard assistance;Assist X1  Gait Training  Gait Training:

## 2023-12-07 NOTE — PROGRESS NOTES
Vitals and assessment complete. See flowsheets for details. Patient denies pain. SPO2 is 90% on 2L nasal cannula. Breathing is regular and unlabored. Dyspnea is noted with exertion. Rhonchi is noted throughout the lungs. An occasional, productive cough is noted. Patient denies further needs at this time. Care ongoing.

## 2023-12-07 NOTE — PLAN OF CARE
Problem: Discharge Planning  Goal: Discharge to home or other facility with appropriate resources  Outcome: Progressing  Flowsheets (Taken 12/7/2023 0016)  Discharge to home or other facility with appropriate resources: Identify barriers to discharge with patient and caregiver     Problem: Pain  Goal: Verbalizes/displays adequate comfort level or baseline comfort level  Outcome: Progressing  Flowsheets (Taken 12/7/2023 0016)  Verbalizes/displays adequate comfort level or baseline comfort level:   Encourage patient to monitor pain and request assistance   Administer analgesics based on type and severity of pain and evaluate response   Assess pain using appropriate pain scale   Implement non-pharmacological measures as appropriate and evaluate response     Problem: Safety - Adult  Goal: Free from fall injury  Outcome: Progressing  Flowsheets (Taken 12/7/2023 0016)  Free From Fall Injury: Instruct family/caregiver on patient safety

## 2023-12-07 NOTE — PROGRESS NOTES
Writer at bedside at this time to perform reassessment. Patient is lying in bed with eyes closed at this time. Vital signs taken and assessment completed at this time. Patient is alert and oriented and has complaint of bilateral lower leg pain 8/10; PRN Norco given at this time. Patient denies any further needs at this time. Call light within reach, bed alarm on for safety, care ongoing.

## 2023-12-08 VITALS
HEART RATE: 83 BPM | BODY MASS INDEX: 24.14 KG/M2 | WEIGHT: 144.9 LBS | SYSTOLIC BLOOD PRESSURE: 119 MMHG | HEIGHT: 65 IN | OXYGEN SATURATION: 97 % | RESPIRATION RATE: 18 BRPM | TEMPERATURE: 97.5 F | DIASTOLIC BLOOD PRESSURE: 63 MMHG

## 2023-12-08 LAB
ANION GAP SERPL CALCULATED.3IONS-SCNC: 11 MMOL/L (ref 9–17)
BASOPHILS # BLD: 0 K/UL (ref 0–0.2)
BASOPHILS NFR BLD: 0 % (ref 0–2)
BUN SERPL-MCNC: 16 MG/DL (ref 8–23)
BUN/CREAT SERPL: 32 (ref 9–20)
CALCIUM SERPL-MCNC: 9.8 MG/DL (ref 8.6–10.4)
CHLORIDE SERPL-SCNC: 100 MMOL/L (ref 98–107)
CO2 SERPL-SCNC: 29 MMOL/L (ref 20–31)
CREAT SERPL-MCNC: 0.5 MG/DL (ref 0.5–0.9)
EOSINOPHIL # BLD: 0 K/UL (ref 0–0.44)
EOSINOPHILS RELATIVE PERCENT: 0 % (ref 1–4)
ERYTHROCYTE [DISTWIDTH] IN BLOOD BY AUTOMATED COUNT: 13.9 % (ref 11.8–14.4)
GFR SERPL CREATININE-BSD FRML MDRD: >60 ML/MIN/1.73M2
GLUCOSE SERPL-MCNC: 136 MG/DL (ref 70–99)
HCT VFR BLD AUTO: 44.4 % (ref 36.3–47.1)
HGB BLD-MCNC: 14.2 G/DL (ref 11.9–15.1)
IMM GRANULOCYTES # BLD AUTO: 0 K/UL (ref 0–0.3)
IMM GRANULOCYTES NFR BLD: 0 %
LYMPHOCYTES NFR BLD: 0.29 K/UL (ref 1.1–3.7)
LYMPHOCYTES RELATIVE PERCENT: 6 % (ref 24–43)
MCH RBC QN AUTO: 30.3 PG (ref 25.2–33.5)
MCHC RBC AUTO-ENTMCNC: 32 G/DL (ref 28.4–34.8)
MCV RBC AUTO: 94.7 FL (ref 82.6–102.9)
MONOCYTES NFR BLD: 0.19 K/UL (ref 0.1–1.2)
MONOCYTES NFR BLD: 4 % (ref 3–12)
MORPHOLOGY: NORMAL
NEUTROPHILS NFR BLD: 90 % (ref 36–65)
NEUTS SEG NFR BLD: 4.32 K/UL (ref 1.5–8.1)
NRBC BLD-RTO: 0 PER 100 WBC
PLATELET # BLD AUTO: 252 K/UL (ref 138–453)
PMV BLD AUTO: 9.4 FL (ref 8.1–13.5)
POTASSIUM SERPL-SCNC: 4.1 MMOL/L (ref 3.7–5.3)
RBC # BLD AUTO: 4.69 M/UL (ref 3.95–5.11)
SODIUM SERPL-SCNC: 140 MMOL/L (ref 135–144)
WBC OTHER # BLD: 4.8 K/UL (ref 3.5–11.3)

## 2023-12-08 PROCEDURE — 6370000000 HC RX 637 (ALT 250 FOR IP): Performed by: INTERNAL MEDICINE

## 2023-12-08 PROCEDURE — 2580000003 HC RX 258

## 2023-12-08 PROCEDURE — 97116 GAIT TRAINING THERAPY: CPT

## 2023-12-08 PROCEDURE — 97110 THERAPEUTIC EXERCISES: CPT

## 2023-12-08 PROCEDURE — 2580000003 HC RX 258: Performed by: NURSE PRACTITIONER

## 2023-12-08 PROCEDURE — 80048 BASIC METABOLIC PNL TOTAL CA: CPT

## 2023-12-08 PROCEDURE — 94664 DEMO&/EVAL PT USE INHALER: CPT

## 2023-12-08 PROCEDURE — 36415 COLL VENOUS BLD VENIPUNCTURE: CPT

## 2023-12-08 PROCEDURE — 6360000002 HC RX W HCPCS: Performed by: NURSE PRACTITIONER

## 2023-12-08 PROCEDURE — 2700000000 HC OXYGEN THERAPY PER DAY

## 2023-12-08 PROCEDURE — 6370000000 HC RX 637 (ALT 250 FOR IP): Performed by: NURSE PRACTITIONER

## 2023-12-08 PROCEDURE — 85025 COMPLETE CBC W/AUTO DIFF WBC: CPT

## 2023-12-08 PROCEDURE — 94761 N-INVAS EAR/PLS OXIMETRY MLT: CPT

## 2023-12-08 PROCEDURE — 94640 AIRWAY INHALATION TREATMENT: CPT

## 2023-12-08 PROCEDURE — 94669 MECHANICAL CHEST WALL OSCILL: CPT

## 2023-12-08 RX ORDER — PREDNISONE 10 MG/1
10 TABLET ORAL 2 TIMES DAILY
Qty: 15 TABLET | Refills: 0 | Status: SHIPPED | OUTPATIENT
Start: 2023-12-08

## 2023-12-08 RX ORDER — WATER 10 ML/10ML
INJECTION INTRAMUSCULAR; INTRAVENOUS; SUBCUTANEOUS
Status: COMPLETED
Start: 2023-12-08 | End: 2023-12-08

## 2023-12-08 RX ORDER — LEVOFLOXACIN 750 MG/1
750 TABLET, FILM COATED ORAL EVERY 24 HOURS
Qty: 3 TABLET | Refills: 0 | Status: SHIPPED | OUTPATIENT
Start: 2023-12-09 | End: 2023-12-12

## 2023-12-08 RX ADMIN — PREGABALIN 300 MG: 75 CAPSULE ORAL at 09:59

## 2023-12-08 RX ADMIN — CALCIUM CARBONATE-VITAMIN D TAB 500 MG-200 UNIT 1 TABLET: 500-200 TAB at 10:00

## 2023-12-08 RX ADMIN — ACETAMINOPHEN 650 MG: 325 TABLET ORAL at 03:53

## 2023-12-08 RX ADMIN — METHYLPREDNISOLONE SODIUM SUCCINATE 60 MG: 125 INJECTION INTRAMUSCULAR; INTRAVENOUS at 11:35

## 2023-12-08 RX ADMIN — FAMOTIDINE 20 MG: 20 TABLET ORAL at 10:00

## 2023-12-08 RX ADMIN — PANTOPRAZOLE SODIUM 40 MG: 40 TABLET, DELAYED RELEASE ORAL at 07:30

## 2023-12-08 RX ADMIN — GUAIFENESIN, DEXTROMETHORPHAN HBR 1 TABLET: 600; 30 TABLET ORAL at 09:59

## 2023-12-08 RX ADMIN — IPRATROPIUM BROMIDE AND ALBUTEROL SULFATE 1 DOSE: .5; 3 SOLUTION RESPIRATORY (INHALATION) at 03:42

## 2023-12-08 RX ADMIN — MULTIPLE VITAMINS W/ MINERALS TAB 1 TABLET: TAB at 10:00

## 2023-12-08 RX ADMIN — ASPIRIN 81 MG: 81 TABLET, COATED ORAL at 09:59

## 2023-12-08 RX ADMIN — LEVOFLOXACIN 750 MG: 750 TABLET, FILM COATED ORAL at 11:34

## 2023-12-08 RX ADMIN — BENZONATATE 100 MG: 100 CAPSULE ORAL at 03:53

## 2023-12-08 RX ADMIN — RIVAROXABAN 20 MG: 20 TABLET, FILM COATED ORAL at 09:59

## 2023-12-08 RX ADMIN — IPRATROPIUM BROMIDE AND ALBUTEROL SULFATE 1 DOSE: .5; 3 SOLUTION RESPIRATORY (INHALATION) at 08:37

## 2023-12-08 RX ADMIN — SODIUM CHLORIDE, PRESERVATIVE FREE 10 ML: 5 INJECTION INTRAVENOUS at 10:00

## 2023-12-08 RX ADMIN — WATER 2 ML: 1 INJECTION INTRAMUSCULAR; INTRAVENOUS; SUBCUTANEOUS at 11:35

## 2023-12-08 RX ADMIN — IPRATROPIUM BROMIDE AND ALBUTEROL SULFATE 1 DOSE: .5; 3 SOLUTION RESPIRATORY (INHALATION) at 11:56

## 2023-12-08 RX ADMIN — LEVOTHYROXINE SODIUM 200 MCG: 100 TABLET ORAL at 07:30

## 2023-12-08 RX ADMIN — POTASSIUM CHLORIDE 20 MEQ: 1500 TABLET, EXTENDED RELEASE ORAL at 10:00

## 2023-12-08 RX ADMIN — SERTRALINE HYDROCHLORIDE 50 MG: 50 TABLET ORAL at 10:00

## 2023-12-08 RX ADMIN — OXYCODONE HYDROCHLORIDE AND ACETAMINOPHEN 500 MG: 500 TABLET ORAL at 09:59

## 2023-12-08 ASSESSMENT — PAIN DESCRIPTION - FREQUENCY: FREQUENCY: CONTINUOUS

## 2023-12-08 ASSESSMENT — PAIN DESCRIPTION - ONSET: ONSET: ON-GOING

## 2023-12-08 ASSESSMENT — PAIN DESCRIPTION - LOCATION
LOCATION: BACK;LEG
LOCATION: BACK;LEG

## 2023-12-08 ASSESSMENT — PAIN DESCRIPTION - DESCRIPTORS
DESCRIPTORS: ACHING;DISCOMFORT
DESCRIPTORS: ACHING

## 2023-12-08 ASSESSMENT — PAIN SCALES - GENERAL
PAINLEVEL_OUTOF10: 3
PAINLEVEL_OUTOF10: 7

## 2023-12-08 ASSESSMENT — PAIN DESCRIPTION - ORIENTATION
ORIENTATION: MID
ORIENTATION: MID

## 2023-12-08 ASSESSMENT — PAIN - FUNCTIONAL ASSESSMENT: PAIN_FUNCTIONAL_ASSESSMENT: ACTIVITIES ARE NOT PREVENTED

## 2023-12-08 ASSESSMENT — PAIN DESCRIPTION - PAIN TYPE: TYPE: CHRONIC PAIN

## 2023-12-08 NOTE — PROGRESS NOTES
Physical Therapy  Facility/Department: [facility] AT THE [facility] MED SURG  Daily Treatment Note  NAME: Lexi Cedillo  : 1945  MRN: [id number]    Date of Service: 2023    Discharge Recommendations:  Continue to assess pending progress, Subacute/Skilled Nursing Facility, Home with Home health PT, 24 hour supervision or assist        Patient Diagnosis(es): The primary encounter diagnosis was COPD exacerbation ([address] A diagnosis of Pneumonia of both lungs due to infectious organism, unspecified part of lung was also pertinent to this visit. Assessment   Assessment: Gait with RW and SBA 100ft, no LOB. Transfers completed with SBA. Seated exercises B LEx20 in all available planes of motion. Short rest breaks as needed d/t fatigue. Will continue to progress as tolerated. Activity Tolerance: Patient tolerated treatment well     Plan    Physical Therapy Plan  General Plan: 2 times a day 7 days a week (1x per day on weekends.)  Current Treatment Recommendations: Strengthening;ROM;Balance training; Endurance training;Functional mobility training;Transfer training;Neuromuscular re-education;Gait training;Home exercise program;Stair training;Positioning; Therapeutic activities; Patient/Caregiver education & training     Restrictions  Restrictions/Precautions  Restrictions/Precautions: Isolation (droplet)  Required Braces or Orthoses?: No     Subjective    Subjective  Subjective: pt in bathroom seated cleaning up upon arrival, pleasant and agreeable to therapy  Pain: denied  Orientation  Overall Orientation Status: Within Functional Limits     Objective      Bed Mobility Training  Bed Mobility Training: No  Transfer Training  Transfer Training: Yes  Overall Level of Assistance: Stand-by assistance;Assist X1  Interventions: Verbal cues;Demonstration  Sit to Stand: Stand-by assistance;Assist X1  Stand to Sit: Stand-by assistance;Assist X1  Stand Pivot Transfers: Assist X1;Stand-by assistance  Gait Training  Gait Training:

## 2023-12-08 NOTE — PROGRESS NOTES
Physician Progress Note      PATIENT:               Kelly Herrera  CSN #:                  [id number]  :                       1945  ADMIT DATE:       2023 9:37 AM  DISCH DATE:  RESPONDING  PROVIDER #:        Carley Guo MD          QUERY TEXT:    Patient admitted with Pneumonia, noted to have atrial fibrillation and is   maintained on Xarelto. If possible, please document in progress notes and   discharge summary if you are evaluating and/or treating any of the following: The medical record reflects the following:  Risk Factors: Age 69, Female, history of atrial fib    Clinical Indicators: Per H&P She does have history of atrial fibrillation  Home meds include Xarelto    Treatment: Xarelto tablet 20 mg, cardiac monitoring      Thank you for your time,    Jose VEGAS, RN, [facility] [address] [place], [facility] [address], [facility]  C: [phone]    [email or web address]  Options provided:  -- Secondary hypercoagulable state in a patient with atrial fibrillation  -- Other - I will add my own diagnosis  -- Disagree - Not applicable / Not valid  -- Disagree - Clinically unable to determine / Unknown  -- Refer to Clinical Documentation Reviewer    PROVIDER RESPONSE TEXT:    This patient has secondary hypercoagulable state in a patient with atrial   fibrillation. Query created by: Jose Perez on 2023 12:49 PM      QUERY TEXT:    Patient admitted with Pneumonia, noted to have atrial fibrillation. If   possible, please document in progress notes and discharge summary further   specificity regarding the type of atrial fibrillation:     The medical record reflects the following:  Risk Factors: Age 69, Female  Clinical Indicators: H&P  \"PMH-Atrial fibrillation\"  Treatment: rivaroxaban (XARELTO) tablet 20 mg, cardiac monitoring    Chronic: nonspecific term that could be referring to paroxysmal, persistent,   or permanent  Longstanding persistent: persistent and continuous, lasting > 1 year. Paroxysmal - self-terminating or intermittent; resolves with or without   intervention within 7 days of onset; may recur with various frequency. Persistent - Fails to terminate within 7 days; Often requires meds or   cardioversion to restore to NSR. Permanent - longstanding & persistent; Medication has been ineffective in   restoring NSR &/or cardioversion is contraindicated    Definitions per MS-DR[employer] Training Guide and Quick Reference Guide, [place] [address]   Diseases and Disorders of the Circulatory System; 2019; . Software content   from the [employer]? Advanced [employer] Transformation Program        Thank you for your time,    Sarah VEGAS, RN, [address]  C: [phone]    [email or web address]  Options provided:  -- Paroxysmal Atrial Fibrillation  -- Longstanding Persistent Atrial Fibrillation  -- Permanent Atrial Fibrillation  -- Persistent Atrial Fibrillation  -- Chronic Atrial Fibrillation, unspecified  -- Other - I will add my own diagnosis  -- Disagree - Not applicable / Not valid  -- Disagree - Clinically unable to determine / Unknown  -- Refer to Clinical Documentation Reviewer    PROVIDER RESPONSE TEXT:    This patient has paroxysmal atrial fibrillation.     Query created by: Sarah Luciano on 12/7/2023 12:52 PM      Electronically signed by:  Marium Morse MD 12/8/2023 1:43 PM

## 2023-12-08 NOTE — PROGRESS NOTES
Vitals and assessment completed at this time, see flowsheet for more details. Pt sitting awake in chair comfortably at this time. Pt denies any pain at this time. Denies any SOB. All needs met at this time, call light within reach. Care ongoing.

## 2023-12-08 NOTE — PLAN OF CARE
Problem: Discharge Planning  Goal: Discharge to home or other facility with appropriate resources  Outcome: Progressing  Flowsheets  Taken 12/7/2023 2032 by Rashmi Brooks RN  Discharge to home or other facility with appropriate resources: Identify barriers to discharge with patient and caregiver  Taken 12/7/2023 1252 by Felipe Whitehead RN  Discharge to home or other facility with appropriate resources:   Identify barriers to discharge with patient and caregiver   Arrange for needed discharge resources and transportation as appropriate     Problem: Pain  Goal: Verbalizes/displays adequate comfort level or baseline comfort level  Outcome: Progressing  Flowsheets (Taken 12/7/2023 2030)  Verbalizes/displays adequate comfort level or baseline comfort level: Encourage patient to monitor pain and request assistance     Problem: Safety - Adult  Goal: Free from fall injury  Outcome: Progressing  Flowsheets (Taken 12/7/2023 1156 by Felipe Whitehead RN)  Free From Fall Injury: Instruct family/caregiver on patient safety     Problem: Nutrition Deficit:  Goal: Optimize nutritional status  12/7/2023 2245 by Rashmi Brooks RN  Outcome: Progressing  12/7/2023 1023 by Lisa Berg RD, LD  Flowsheets (Taken 12/7/2023 1023)  Nutrient intake appropriate for improving, restoring, or maintaining nutritional needs:   Monitor oral intake, labs, and treatment plans   Recommend appropriate diets, oral nutritional supplements, and vitamin/mineral supplements  Note: Nutrition Problem #1: Other (Comment) (mild malnutrition)  Intervention: Food and/or Nutrient Delivery: Continue Current Diet, Start Oral Nutrition Supplement       Problem: Chronic Conditions and Co-morbidities  Goal: Patient's chronic conditions and co-morbidity symptoms are monitored and maintained or improved  Outcome: Progressing  Flowsheets  Taken 12/7/2023 2032 by Rashmi Brooks RN  Care Plan - Patient's Chronic Conditions and Co-Morbidity Symptoms are Monitored and Maintained or Improved: Monitor and assess patient's chronic conditions and comorbid symptoms for stability, deterioration, or improvement  Taken 12/7/2023 1252 by Paul Tavares RN  Care Plan - Patient's Chronic Conditions and Co-Morbidity Symptoms are Monitored and Maintained or Improved: Monitor and assess patient's chronic conditions and comorbid symptoms for stability, deterioration, or improvement

## 2023-12-08 NOTE — PROGRESS NOTES
Pt is A/O x 4, calm and cooperative. Assessment and vital signs completed, see flow sheet. Pt resting in bed and call light within reach. Pt rates pain 7/10 and not requesting pain medication at this time. Denies further needs and will continue to monitor.

## 2023-12-08 NOTE — PLAN OF CARE
Problem: Discharge Planning  Goal: Discharge to home or other facility with appropriate resources  12/8/2023 1115 by Bay Jo RN  Outcome: Progressing     Problem: Pain  Goal: Verbalizes/displays adequate comfort level or baseline comfort level  12/8/2023 1115 by Bay Jo RN  Outcome: Karli Llanos (Taken 12/7/2023 0397 by Soni Nelson, RN)  Verbalizes/displays adequate comfort level or baseline comfort level: Encourage patient to monitor pain and request assistance     Problem: Safety - Adult  Goal: Free from fall injury  12/8/2023 1115 by Bay Jo RN  Outcome: Progressing  Flowsheets (Taken 12/8/2023 2793 by Soni Nelson, RN)  Free From Fall Injury: Instruct family/caregiver on patient safety     Problem: Nutrition Deficit:  Goal: Optimize nutritional status  12/8/2023 1115 by Bay Jo RN  Outcome: Progressing     Problem: Chronic Conditions and Co-morbidities  Goal: Patient's chronic conditions and co-morbidity symptoms are monitored and maintained or improved  12/8/2023 1115 by Bay Jo RN  Outcome: Progressing

## 2023-12-08 NOTE — PROGRESS NOTES
Discharge summary reviewed with patient and copy provided to patient. Answered any questions or concerns. Patient taken down via wheelchair to transportation home.

## 2023-12-08 NOTE — PLAN OF CARE
Problem: Discharge Planning  Goal: Discharge to home or other facility with appropriate resources  12/8/2023 1426 by Chester Murphy RN  Outcome: Completed     Problem: Pain  Goal: Verbalizes/displays adequate comfort level or baseline comfort level  12/8/2023 1426 by Chester Murphy RN  Outcome: Completed     Problem: Safety - Adult  Goal: Free from fall injury  12/8/2023 1426 by Chester Murphy RN  Outcome: Completed     Problem: Nutrition Deficit:  Goal: Optimize nutritional status  12/8/2023 1426 by Chester Murpyh RN  Outcome: Completed     Problem: Chronic Conditions and Co-morbidities  Goal: Patient's chronic conditions and co-morbidity symptoms are monitored and maintained or improved  12/8/2023 1426 by Chester Murphy RN  Outcome: Completed

## 2023-12-08 NOTE — PROGRESS NOTES
Occupational Therapy  Facility/Department: [facility] AT THE [facility] MED SURG  Daily Treatment Note  NAME: Armin Turcios  : 1945  MRN: [id number]    Date of Service: 2023    Discharge Recommendations:  Continue to assess pending progress         Patient Diagnosis(es): The primary encounter diagnosis was COPD exacerbation ([address] A diagnosis of Pneumonia of both lungs due to infectious organism, unspecified part of lung was also pertinent to this visit. Assessment    Activity Tolerance: Patient limited by endurance; Patient limited by fatigue  Discharge Recommendations: Continue to assess pending progress      Plan   Occupational Therapy Plan  Times Per Day: Once a day  Days Per Week: 7 Days  Current Treatment Recommendations: Strengthening;ROM; Functional mobility training; Safety education & training;Patient/Caregiver education & training;Equipment evaluation, education, & procurement;Self-Care / ADL     Restrictions   Restrictions/Precautions  Restrictions/Precautions: Isolation (droplet)  Required Braces or Orthoses?: No     Subjective   Subjective  Subjective: Pt seated in recliner and agreeable to therex. \"I am going home today. I feel good about it. \"  Pain: denied  Orientation           Objective    Vitals     OT Exercises  Exercise Treatment: Pt completed BUE therex seated with Mod RBs d/t fatigue/low endurance. Pt completed green digiflex x 20, yellow flex bar x 20 bends and 1# free weight x 20 reps x all planes shoulder elbow and wrist. Limitations noted in L shoulder from previous injury. Safety Devices  Type of Devices: All fall risk precautions in place;Call light within reach; Left in chair;Chair alarm in place     Patient Education  Education Given To: Patient  Education Provided: Role of Therapy;Plan of Care;Home Exercise Program  Education Method: Verbal;Demonstration  Barriers to Learning: None  Education Outcome: Demonstrated understanding    Goals  Short Term Goals  Time Frame for Short Term

## 2023-12-08 NOTE — PROGRESS NOTES
Received orders from Dr Aguilera Sheets to call prednisone 10 mg bid for 5 days then 1 tab for 5 days to the pharmacy.

## 2023-12-08 NOTE — PROGRESS NOTES
Physical Therapy  Facility/Department: [facility] AT THE [facility] MED SURG  Daily Treatment Note  NAME: Joseph Barry  : 1945  MRN: [id number]    Date of Service: 2023    Discharge Recommendations:  Continue to assess pending progress, Subacute/Skilled Nursing Facility, Home with Home health PT, 24 hour supervision or assist        Patient Diagnosis(es): The primary encounter diagnosis was COPD exacerbation ([address]). A diagnosis of Pneumonia of both lungs due to infectious organism, unspecified part of lung was also pertinent to this visit. Assessment   Assessment: Gait with hurrycane and SBA 80ft, no LOB however pt is a little impulsive. Education on importance of slowing down and ensuring AD and oxygen are situated prior to getting up and walking. Transfers completed with SBA. Seated exercises B LEx20 in all available planes of motion. Short rest breaks as needed d/t fatigue. Mild SOB with activity noted--sp02 92-93% on 2L supplimental oxygen. Nursing staff in to remove IV and prep patient for d/c, therefore tx ended. Activity Tolerance: Patient limited by fatigue     Plan    Physical Therapy Plan  General Plan: 2 times a day 7 days a week (1x per day on weekends.)  Current Treatment Recommendations: Strengthening;ROM;Balance training; Endurance training;Functional mobility training;Transfer training;Neuromuscular re-education;Gait training;Home exercise program;Stair training;Positioning; Therapeutic activities; Patient/Caregiver education & training     Restrictions  Restrictions/Precautions  Restrictions/Precautions: Isolation (droplet)  Required Braces or Orthoses?: No     Subjective    Subjective  Subjective: pt in chair upon arrival, pleasant and agreeable to therapy  Pain: denied  Orientation  Overall Orientation Status: Within Functional Limits     Objective    Bed Mobility Training  Bed Mobility Training: No  Transfer Training  Transfer Training: Yes  Overall Level of Assistance: Stand-by assistance;Assist X1  Interventions: Verbal cues;Demonstration (Cues to slow down, and use proper hand placement, ensure oxgyen tubing and AD are situated.)  Sit to Stand: Stand-by assistance;Assist X1  Stand to Sit: Stand-by assistance;Assist X1  Stand Pivot Transfers: Assist X1;Stand-by assistance  Gait Training  Gait Training: Yes  Gait  Overall Level of Assistance: Stand-by assistance;Assist X1  Distance (ft): 80 Feet  Assistive Device: Gait belt; Other (comment) (Beba)  Interventions: Demonstration;Verbal cues  Base of Support: Narrowed  Speed/Helen: Accelerated  Step Length: Right shortened;Left shortened  Gait Abnormalities: Decreased step clearance     PT Exercises  Exercise Treatment: Seated exercises B LEx20 in all available planes of motion. Short rest breaks as needed d/t fatigue. Safety Devices  Type of Devices: All fall risk precautions in place;Call light within reach; Chair alarm in place;Gait belt;Nurse notified; Left in chair;Patient at risk for falls (Nursing staff in room at completion of tx.)       Goals  Short Term Goals  Time Frame for Short Term Goals: 20 days  Short Term Goal 1: Pt will demonstrate supine to and from sit on edge of bed at supervision at most in order to improve safety with bed mobility  Short Term Goal 2: Pt will tolerate 5 minutes of standing activity without loss of balance in order to improve tolerance to daily tasks  Short Term Goal 3: Pt will ambulate 50 feet with least restrictive assistive device at SBA at most in order to improve safety with home ambulation  Patient Goals   Patient Goals : go home    Education  Patient Education  Education Given To: Patient  Education Provided: Home Exercise Program;Transfer Training; Fall Prevention Strategies  Education Provided Comments: Cues to slow down/overall safety with functional mobility--continue to educate. Discharge folder provided, no further questions.   Education Method: Verbal;Demonstration  Barriers to Learning: None  Education

## 2023-12-09 LAB
MICROORGANISM SPEC CULT: ABNORMAL
MICROORGANISM SPEC CULT: ABNORMAL
MICROORGANISM/AGENT SPEC: ABNORMAL
SPECIMEN DESCRIPTION: ABNORMAL

## 2023-12-11 ENCOUNTER — CARE COORDINATION (OUTPATIENT)
Dept: CASE MANAGEMENT | Age: 78
End: 2023-12-11

## 2023-12-11 DIAGNOSIS — J18.9 MULTIFOCAL PNEUMONIA: ICD-10-CM

## 2023-12-11 DIAGNOSIS — J44.9 CHRONIC OBSTRUCTIVE PULMONARY DISEASE, UNSPECIFIED COPD TYPE (HCC): Primary | Chronic | ICD-10-CM

## 2023-12-11 PROCEDURE — 1111F DSCHRG MED/CURRENT MED MERGE: CPT | Performed by: INTERNAL MEDICINE

## 2023-12-11 NOTE — CARE COORDINATION
Care Transitions Initial Follow Up Call    Call within 2 business days of discharge: Yes    Patient Current Location:  Home: [address] [place] [address]    Care Transition Nurse contacted the patient by telephone to perform post hospital discharge assessment. Verified name and  with patient as identifiers. Provided introduction to self, and explanation of the Care Transition Nurse role. Patient: Leon Pugh Patient : 1945   MRN: [id number]  Reason for Admission: COPD exacerbation, Multifocal PNE  Discharge Date: 23 RARS: Readmission Risk Score: 19      Last Discharge Facility       Date Complaint Diagnosis Description Type Department Provider    23 Cough; Shortness of Breath COPD exacerbation ([address]) . .. [facility]; Robert Persaud. .. Was this an external facility discharge? No Discharge Facility: [facility]    Challenges to be reviewed by the provider   Additional needs identified to be addressed with provider: No  none               Method of communication with provider: none. Spoke with patient for initial 24 hour transitional follow up call. Today, she sounds hoarse but is feeling better than what she did prior to admission. She is short of breath with just talking today, has her O2 at 3L/min currently and states saturations in the 90's. She does drop to the mid 80's at times and will increase her O2 to bring her saturations up. She denies any f/c, n/v or other s/s of worsening PNE. She has a productive cough of yellow phlegm, states she has fatigue. Patient discharged to home with spouse on oral ATB. She has scheduled her follow up at PCP office for  and has an appointment at [facility] to see Dr. Kaleb Mann and have her pain pump filled.   She gets Dilaudid and Klonipin through this and is asking if there is a way she can get this filled locally as it is cumbersome to go every 2 months to

## 2023-12-12 NOTE — DISCHARGE SUMMARY
palpation. EXT:   no edema bilaterally . No calf tenderness. NEURO: Moves all extremities. Motor and sensory are grossly intact  SKIN:  No rashes. No skin lesions. Consultants:    none    Procedures:    none    Complications:   none    Significant Diagnostic Studies:   Complete Blood Count:     12/06/23  0958 12/07/23 0603 12/08/23  0610   WBC 7.0 4.7 4.8   RBC 4.36 4.69 4.69   HGB 13.1 14.4 14.2   HCT 40.9 43.9 44.4   MCV 93.8 93.6 94.7   MCH 30.0 30.7 30.3   MCHC 32.0 32.8 32.0   RDW 13.9 14.2 13.9    216 252   MPV 9.8 9.6 9.4         Recent Blood Glucose levels:     12/06/23 0958 12/07/23 0603 12/08/23  0610   GLUCOSE 106* 145* 136*         Comprehensive Metabolic Profile:     52/78/39  0958 12/07/23 0603 12/08/23  0610    138 140   K 3.8 4.3 4.1   CL 98 100 100   CO2 28 28 29   BUN 14 12 16   CREATININE 0.5 0.5 0.5   GLUCOSE 106* 145* 136*   CALCIUM 9.3 9.9 9.8   PROT 7.2  --   --    LABALBU 4.0  --   --    BILITOT 0.4  --   --    ALKPHOS 71  --   --    AST 18  --   --    ALT 15  --   --          HgBA1c:    Component Value Date/Time     LABA1C 4.8 09/29/2023 10:48 AM         Lactic Acid:   Component Value Date/Time     LACTA 0.8 12/06/2023 09:58 AM         High Sensitivity Troponin:    12/06/23  0958   TROPHS 18*          Microbiology / Cultures:  Results       Procedure Component Value Units Date/Time    Culture, Respiratory [[id number]]  (Abnormal) Collected: 12/06/23 1518    Order Status: Completed Specimen: Sputum Expectorated Updated: 12/09/23 1359     Specimen Description . EXPECTORATED SPUTUM     Direct Exam < 10 EPITHELIAL CELLS/LPF      >10, <25 NEUTROPHILS/LPF      FEW GRAM POSITIVE COCCI IN PAIRS      FEW YEAST     Culture NORMAL RESPIRATORY STEVE LIGHT GROWTH      Candida albicans/dubliniensis LIGHT GROWTH    Rapid influenza A/B antigens [[id number]] Collected: 12/06/23 1340    Order Status: Completed Specimen: Nasopharyngeal Updated: 12/06/23 1406     Flu A Antigen

## 2023-12-15 ENCOUNTER — CARE COORDINATION (OUTPATIENT)
Dept: CASE MANAGEMENT | Age: 78
End: 2023-12-15

## 2023-12-15 NOTE — CARE COORDINATION
Care Transitions Follow Up Call    Patient Current Location:  Home: [address] [place] [address] Care Coordinator contacted the patient by telephone to follow up after admission on 2023. Verified name and  with patient as identifiers. Patient: Lexi Cedillo  Patient : 1945   MRN: [id number]  Reason for Admission: COPD exacerbation, Multifocal PNE  Discharge Date: 23 RARS: Readmission Risk Score: 19      Needs to be reviewed by the provider   Additional needs identified to be addressed with provider: No  none             Method of communication with provider: none. .     Writer spoke with Delilah Guzman for a follow up care transitions call. She was having continued SOB and cough so she saw the CNP for her MD yesterday and was prescribed,Levaquin,Prednisone and [employer]. She states she is already feeling much better today. Denies fever or chills and states cough and SOB are both improved. She wears her oxygen and checks her SPO2 several times a day. She states it has been in the mid 90's. She has her hospital follow up on 2023 and will be rechecked then. Addressed changes since last contact:   saw CNP yesterday-ordered Levaquin,Prednisone and Pro air  Discussed follow-up appointments. If no appointment was previously scheduled, appointment scheduling offered: Yes. Is follow up appointment scheduled within 7 days of discharge? Yes    Follow Up  Future Appointments   Date Time Provider [address] [place] [address]   2023 10:00 AM Michelle Byrd   2024 10:00 AM MD SUGEY Bonner CARD [facility]   3/4/2024 10:00 AM MD SUGEY Herrera PULM [facility]   2024  1:45 PM MD Judd Echevarria C.D.   10/14/2024  2:45 PM MD Judd Echevarria C.D.     External follow up appointment(s): N/A    N Care Coordinator reviewed red flags with patient and discussed any barriers to care and/or

## 2023-12-28 ENCOUNTER — CARE COORDINATION (OUTPATIENT)
Dept: CASE MANAGEMENT | Age: 78
End: 2023-12-28

## 2023-12-28 NOTE — CARE COORDINATION
Care Transitions Follow Up Call    Patient Current Location:  Home: [address] [place] [address]    Care Transition Nurse contacted the patient by telephone to follow up after admission on . Verified name and  with patient as identifiers. Patient: Thomas Sun  Patient : 1945   MRN: [id number]  Reason for Admission: COPD exacerbation, Multifocal PNE  Discharge Date: 23 RARS: Readmission Risk Score: 19      Needs to be reviewed by the provider   Additional needs identified to be addressed with provider: No  none             Method of communication with provider: none. Spoke with Agus Woodward today for transitional follow up call. She is doing ok this week, she continues to have a productive cough of yellow sputum but denies any f/c, n/v, new shortness of breath or any other issues with her breathing. She has chronic back pain, had a fall a couple of months ago and since then c/o pain down her left leg and feels like sometimes it is going to give out on her. She will be going for an injection to her back once she hears back from Dr. Himanshu Mendez office at [facility]. She gets her pain pump filled there as well and this was just done last week. I did give her information for [facility] if she wanted to see someone locally however, her son advised her to stay with the one she has now at [facility]. She is doing ok this week, she denies any new needs or concerns. Advised to monitor for new s/s of infection such as f/c, new dyspnea, increased fatigue. Addressed changes since last contact:  none  Discussed follow-up appointments. If no appointment was previously scheduled, appointment scheduling offered: Yes. Is follow up appointment scheduled within 7 days of discharge? Yes.     Follow Up  Future Appointments   Date Time Provider [address]   2024 10:00 AM Brittaney Arceo MD [facility]   3/4/2024 10:00 AM Irlanda Murray MD [facility] PULM [facility]   2024  1:45 PM Bea Llamas

## 2024-01-04 ENCOUNTER — CARE COORDINATION (OUTPATIENT)
Dept: CASE MANAGEMENT | Age: 79
End: 2024-01-04

## 2024-01-04 NOTE — CARE COORDINATION
Care Transitions Follow Up Call    Patient Current Location:  Home:  [address] [place] [id number]    Care Transition Nurse contacted the patient by telephone to follow up after admission on .  Verified name and  with patient as identifiers.    Patient: Toshia South  Patient : 1945   MRN: [id number]  Reason for Admission: COPD, multifocal PNE  Discharge Date: 23 RARS: Readmission Risk Score: 19      Needs to be reviewed by the provider   Additional needs identified to be addressed with provider: No  none             Method of communication with provider: none.    Spoke with Toshia today for transitional follow up call. She said she has done pretty well this week, denies any f/c, n/v or worsening shortness of breath.  She has an occasional productive cough of yellow/white sputum, states her coughing has lessened this past week.  She has COPD, has chronic GONZALEZ, no worse than her baseline.  She c/o left leg pain with pain from hip that runs down her leg.  She follows with Dr. Pyle at [facility] and has appointment in March to get injection in her back.  She also has a pain pump that he manages as well.    Patient states she has been doing pretty well this week, denies any new needs or concerns at this time, expressed I would reach out one more time next week to check on her. Advised to call back if any questions o concerns.     Addressed changes since last contact:  none  Discussed follow-up appointments. If no appointment was previously scheduled, appointment scheduling offered: Yes.   Is follow up appointment scheduled within 7 days of discharge? Yes.    Follow Up  Future Appointments   Date Time Provider Department Center   2024 10:00 AM Jose Miner MD [facility] CARD [facility]   3/4/2024 10:00 AM Boy Dennis MD [facility] PULM [facility]   2024  1:45 PM Jong Moraes MD [facility]   10/14/2024  2:45 PM Jong Moraes MD [facility]

## 2024-01-10 ENCOUNTER — CARE COORDINATION (OUTPATIENT)
Dept: CASE MANAGEMENT | Age: 79
End: 2024-01-10

## 2024-01-10 NOTE — CARE COORDINATION
10:00 AM Jose Miner MD [facility]   3/4/2024 10:00 AM Boy Dennis MD [facility] PULM [facility]   4/29/2024  1:45 PM Jong Moraes MD [facility]   10/14/2024  2:45 PM Jong Moraes MD [facility]     External follow up appointment(s): 2/16 back injection at     Care Transition Nurse reviewed medical action plan and red flags with patient and discussed any barriers to care and/or understanding of plan of care after discharge. Discussed appropriate site of care based on symptoms and resources available to patient including: PCP  Specialist. The patient agrees to contact the PCP office for questions related to their healthcare.     Advance Care Planning:   not on file; education provided.     Patients top risk factors for readmission: medical condition-COPD  Interventions to address risk factors: Obtained and reviewed discharge summary and/or continuity of care documents    Offered patient enrollment in the Remote Patient Monitoring (RPM) program for in-home monitoring: Patient is not eligible for RPM program.     Care Transitions Subsequent and Final Call    Schedule Follow Up Appointment with PCP: Completed  Subsequent and Final Calls  Do you have any ongoing symptoms?: Yes  Onset of Patient-reported symptoms: Today  Patient-reported symptoms: Fatigue, Weakness  Have your medications changed?: No  Do you have any questions related to your medications?: No  Do you currently have any active services?: Yes  Are you currently active with any services?: Home Health  Do you have any needs or concerns that I can assist you with?: No  Identified Barriers: Lack of Education  Care Transitions Interventions  Other Interventions:             Care Transition Nurse provided contact information for future needs. Plan for follow-up call in 5-7 days based on severity of symptoms and risk factors.  Plan for next call: referral to ambulatory care managerAna for  of

## 2024-01-15 ENCOUNTER — CARE COORDINATION (OUTPATIENT)
Dept: CARE COORDINATION | Age: 79
End: 2024-01-15

## 2024-01-15 NOTE — TELEPHONE ENCOUNTER
Charo, please send Rx for a Z-pack for Sary.      Delphine, please let Sary know to continue the nebulizer Tx's and the cough syrup prn.      Thank you!

## 2024-01-16 NOTE — CARE COORDINATION
1/16/2024:    Call placed to patient. Spoke with Toshia and informed her of PCP recommendations and suggestions. Informed a Z pack would be sent to pharmacy. Voiced understanding.     Future Appointments   Date Time Provider Department Center   2/13/2024 10:00 AM Jose Miner MD [place] CARD [facility]   3/4/2024 10:00 AM Boy Dennis MD [place] PULM [facility]   4/29/2024  1:45 PM Jong Moraes MD [facility]   10/14/2024  2:45 PM Jong Moraes MD [facility]       Care Coordination Plan of Care:   This nurse Care Coordinator will  - plan outreach call to patient again later this week   -get Z-pack?   -continue to use nebulizer and cough syrup?   -how are symptoms?

## 2024-01-16 NOTE — CARE COORDINATION
"Sports Medicine Clinic Visit - Interim History January 15, 2020    PCP: Savana Tan    Renée Persaud is a 17  year old 6  month old female who is seen in f/u up for Acute foot pain, left. Since last visit on 1/10/20, patient has less pain and swelling in her foot. She has been non weight bearing with walking boot and crutches.  Here to review MRI results.    Symptoms are better with: Rest walking boot  Symptoms are worse with: walking  Additional Features:   Positive: swelling, bruising and weakness   Negative: popping, grinding, catching, locking, instability, paresthesias and numbness    Social History: 11th grade at [employer], basketball, volleyball, softball    Review of Systems  Skin: yes bruising, no swelling  Musculoskeletal: as above  Neurologic: no numbness, paresthesias  Remainder of review of systems is negative including constitutional, CV, pulmonary, GI, except as noted in HPI or medical history.    Patient's current problem list, past medical and surgical history, and family history were reviewed.    Patient Active Problem List   Diagnosis     Scoliosis     Routine general medical examination at a health care facility     Past Medical History:   Diagnosis Date     Routine infant or child health check      Unspecified otitis media      Past Surgical History:   Procedure Laterality Date     NO HISTORY OF SURGERY       Family History   Problem Relation Age of Onset     Diabetes Maternal Grandmother      Diabetes Paternal Grandfather        Objective  /84   Ht 1.727 m (5' 8\")   Wt 70.3 kg (155 lb)   BMI 23.57 kg/m      GENERAL APPEARANCE: healthy, alert and no distress   GAIT: antalgic  SKIN: no suspicious lesions or rashes  HEENT: Sclera clear, anicteric  CV: good peripheral pulses  RESP: Breathing not labored  NEURO: Normal strength and tone, mentation intact and speech normal  PSYCH:  mentation appears normal and affect normal/bright    Bilateral Foot and Ankle " Rx for zpack sent to [facility].   Exam:  Inspection:       ecchymosis noted dorsal foot       edema noted dorsal foot     Tender:       Lisfranc ligament, dorsal foot at base of 2nd - 4th metatarsals - improved tenderness     Non-Tender:       remainder of ankle and foot bilateral     ROM:        Slightly limited ankle ROM left     Strength:       ankle dorsiflexion 4/5 left       plantarflexion 4/5 left       inversion 4/5 left       eversion 4/5 left     Gait:       normal     Neurovascular:       2+ peripheral pulses bilaterally and brisk capillary refill       sensation grossly intact    Radiology  I ordered, visualized and reviewed these images with the patient  MR left foot without  contrast 1/13/2020 8:14 AM     History:   eval Lisfranc injury; Acute foot pain, left     Additional History from EMR: Severe pain dorsal foot after jumping and  landing.     Techniques: Multiplanar multisequence imaging of the left foot was  obtained without  administration of intravenous contrast.     Comparison: Radiograph 1/10/2020     Findings:     Bones     No fracture or marrow infiltrative changes. There is mild bone marrow  edema like signal intensity involving the lateral base of the second  metatarsal and opposing middle cuneiform (series 7, images 17 and 18  and series 5, images 8 and 9). Bone marrow edema like signal intensity  involving the dorsal of the lateral cuneiform. Findings likely  representing bone contusion. There is a bipartite tibial sesamoid with  opposing bone marrow edema, which may represent sesamoiditis.     Joints and periarticular soft tissue     Joint effusion: There is fluid in the first MTP joint as well as  tracking down the shaft and regional soft tissues surrounding the  first metatarsal. Additionally, there is fluid within the intertarsal  joints..     Plantar plates: Intersesamoidal ligament and sesamoidal phalangeal  ligaments of the first metatarsophalangeal joints are all well  evaluated as the short axis sequences do not  cover this area. Plantar  plates of the second through fifth toe at metatarsophalangeal joints  are grossly intact.     Intermetatarsal spaces: No interdigital neuroma. No intermetatarsal  bursitis.     Ligaments and Tendons     Lisfranc interosseous ligament: There is high signal involving the  Lisfranc and interosseous ligament with a wavy appearance with high  grade tearing of the interosseous ligament with only a thin strand of  fibers remaining. There is full-thickness tear of the dorsal Lisfranc  ligament best seen on series 7, image 14 and series 9, image 15.     The intercuneiform ligaments are intact.     Tendons: The visualized courses of flexor and extensor tendons are  intact.      Muscles     Edema within the dorsal and plantar and interosseous ligaments of the  first interspace.     Assessment:  1. Sprain of tarsometatarsal ligament of left foot, subsequent encounter      Given injury I recommend Podiatry referral.  Continue walking boot and crutches for NWB in the interim.    Plan:  - Today's Plan of Care:  Referral to Podiatry  Continue immobilization in walking boot and non weight bearing on crutches    Follow Up: with Podiatry    Concerning signs and symptoms were reviewed.  The patient expressed understanding of this management plan and all questions were answered at this time.    Kimmy Escobar MD [facility]  Primary Care Sports Medicine  [facility]

## 2024-01-16 NOTE — CARE COORDINATION
Ambulatory Care Coordination Note  1/15/2024    Patient Current Location:  Home: [address]  [facility] [place] [facility] [address]     ACM contacted the patient by telephone. Verified name and  with patient as identifiers. Provided introduction to self, and explanation of the ACM role.     Challenges to be reviewed by the provider   Additional needs identified to be addressed with provider: Yes  Cough,congestion               Method of communication with provider: chart routing.    ACM: Delphine Gao, RN    CC outreach call placed to patient per handoff from CTN. Spoke with patient and spouse. Reports she is doing ok. Does complain about having continued cough and congestion for over 1 week now. Bringing up green phlegm throughout entire day. No fevers or other symptoms. No know ill contacts recently. States she has been using the prescribed cough syrup and doing nebulizer treatments with no relief. Worried about pneumonia again. Does have vest \"shaker\" she can use to that really gets her coughing. She is asking writer to seek PCP recommendations. She does not wish to go out in the cold for a visit unless she really has to.     Offered patient enrollment in the Remote Patient Monitoring (RPM) program for in-home monitoring: Yes, but did not enroll at this time.      Future Appointments   Date Time Provider Department Center   2024 10:00 AM Jose Miner MD [facility] C[facility]2024 10:[facility]   2024  1:45 PM Jong Moraes MD [facility]   10/14/2024  2:45 PM Jong Moraes MD [facility]       Care Coordination Plan of Care:   This nurse Care Coordinator will  - route encounter to PCP ask update and seek recommendations   -will update patient with response

## 2024-01-19 ENCOUNTER — CARE COORDINATION (OUTPATIENT)
Dept: CARE COORDINATION | Age: 79
End: 2024-01-19

## 2024-01-19 NOTE — CARE COORDINATION
Ambulatory Care Coordination Note  2024    Patient Current Location:  Home: [address]  [facility] [address]     ACM contacted the patient by telephone. Verified name and  with patient as identifiers. Provided introduction to self, and explanation of the ACM role.     Challenges to be reviewed by the provider   Additional needs identified to be addressed with provider: No  none               Method of communication with provider: none.    ACM: Delphine Gao, RN    Spoke with Toshia today who reports she did get Z pack picked up from the pharmacy. Has been taking as prescribed. Has 1 more day left. Also using her prescribed cough syrup and nebulizer treatments. Overall feeling much better. Still coughing but not as much. Breathing easier. Pulse ox at home greater than 90%. Appreciative for help this week and call today.     Offered patient enrollment in the Remote Patient Monitoring (RPM) program for in-home monitoring: Patient declined.    Care Coordination Plan of Care:   This nurse Care Coordinator will  - plan outreach to patient in 7-10 days   -over illness- anymore coughing?   -how is breathing overall?       Future Appointments   Date Time Provider Department Center   2024 10:00 AM Jose Miner MD [facility] CARD [facility]   3/4/2024 10:00 [facility]   2024  1:45 PM Jong Moraes MD [facility]   10/14/2024  2:45 PM Jong Moraes MD [facility]

## 2024-01-29 ENCOUNTER — CARE COORDINATION (OUTPATIENT)
Dept: CARE COORDINATION | Age: 79
End: 2024-01-29

## 2024-01-29 NOTE — CARE COORDINATION
Ambulatory Care Coordination Note  1/29/2024     outreach call placed to patient. Unable to reach Toshia. Left a voicemail message requesting a return phone call back to this [facility] when patient is able to [phone], office hours given.       Future Appointments   Date Time Provider Department Center   2/13/2024 10:00 AM Jose Miner MD [place] CARD [facility]   3/4/2024 10:00 AM [facility] [place] [facility]   4/29/2024  1:45 PM Jong Moraes MD [facility]   10/14/2024  2:45 PM Jong Moraes MD [facility]   2/16- back injection at    March replace pain pump     Care Coordination Plan of Care:   This nurse Care Coordinator will  - await call back from patient, and if no return call; will attempt to reach patient back again in 1 week   -follow up recent illness- any additional needs?   -how is breathing?   -review SDOH needs if any

## 2024-02-09 ENCOUNTER — CARE COORDINATION (OUTPATIENT)
Dept: CARE COORDINATION | Age: 79
End: 2024-02-09

## 2024-02-09 NOTE — CARE COORDINATION
Ambulatory Care Coordination Note  2/9/2024     outreach call placed to patient. Unable to reach Toshia. Left a voicemail message requesting a return phone call back to this [facility] when patient is able to [phone], office hours given.       Future Appointments   Date Time Provider Department Center   2/13/2024 10:00 AM Jose Miner MD [place] CARD [facility]   3/4/2024 10:00 AM Boy Dennis MD [place] [facility]UL[facility]   4/29/2024  1:45 PM Jong Moraes MD [facility]   10/14/2024  2:45 PM Jong Moraes MD [facility]   2/16- back injection at    March replace pain pump     Care Coordination Plan of Care:   This nurse Care Coordinator will  - await call back from patient, and if no return call; will attempt to reach patient back again in 1 week   -follow up recent illness- any additional needs?   -how is breathing?   -review SDOH needs if any

## 2024-02-13 ENCOUNTER — CARE COORDINATION (OUTPATIENT)
Dept: CARE COORDINATION | Age: 79
End: 2024-02-13

## 2024-02-13 ENCOUNTER — APPOINTMENT (OUTPATIENT)
Dept: CT IMAGING | Age: 79
End: 2024-02-13
Payer: MEDICARE

## 2024-02-13 ENCOUNTER — HOSPITAL ENCOUNTER (EMERGENCY)
Age: 79
Discharge: HOME | End: 2024-02-13
Payer: MEDICARE

## 2024-02-13 ENCOUNTER — OFFICE VISIT (OUTPATIENT)
Dept: CARDIOLOGY | Age: 79
End: 2024-02-13
Payer: MEDICARE

## 2024-02-13 VITALS
HEART RATE: 71 BPM | RESPIRATION RATE: 19 BRPM | TEMPERATURE: 98.1 F | SYSTOLIC BLOOD PRESSURE: 155 MMHG | OXYGEN SATURATION: 90 % | WEIGHT: 145 LBS | BODY MASS INDEX: 24.16 KG/M2 | DIASTOLIC BLOOD PRESSURE: 75 MMHG | HEIGHT: 65 IN

## 2024-02-13 VITALS
BODY MASS INDEX: 24.16 KG/M2 | SYSTOLIC BLOOD PRESSURE: 116 MMHG | DIASTOLIC BLOOD PRESSURE: 62 MMHG | HEART RATE: 77 BPM | RESPIRATION RATE: 18 BRPM | OXYGEN SATURATION: 92 % | HEIGHT: 65 IN | WEIGHT: 145 LBS

## 2024-02-13 DIAGNOSIS — Z01.818 PREOPERATIVE CLEARANCE: Primary | ICD-10-CM

## 2024-02-13 DIAGNOSIS — J18.9 MULTIFOCAL PNEUMONIA: ICD-10-CM

## 2024-02-13 DIAGNOSIS — R06.02 SOB (SHORTNESS OF BREATH): ICD-10-CM

## 2024-02-13 DIAGNOSIS — I50.42 CHRONIC COMBINED SYSTOLIC AND DIASTOLIC CONGESTIVE HEART FAILURE (HCC): ICD-10-CM

## 2024-02-13 DIAGNOSIS — Z86.79 HISTORY OF ATRIAL FIBRILLATION: ICD-10-CM

## 2024-02-13 DIAGNOSIS — Z79.01 CHRONIC ANTICOAGULATION: ICD-10-CM

## 2024-02-13 DIAGNOSIS — J44.9 STAGE 3 SEVERE COPD BY GOLD CLASSIFICATION (HCC): ICD-10-CM

## 2024-02-13 DIAGNOSIS — S09.90XA CLOSED HEAD INJURY, INITIAL ENCOUNTER: Primary | ICD-10-CM

## 2024-02-13 DIAGNOSIS — I25.10 MILD CAD: ICD-10-CM

## 2024-02-13 PROBLEM — F33.9 MAJOR DEPRESSIVE DISORDER, RECURRENT, UNSPECIFIED (HCC): Status: ACTIVE | Noted: 2024-02-13

## 2024-02-13 PROBLEM — F33.0 MAJOR DEPRESSIVE DISORDER, RECURRENT, MILD (HCC): Status: ACTIVE | Noted: 2024-02-13

## 2024-02-13 PROBLEM — F33.1 MAJOR DEPRESSIVE DISORDER, RECURRENT, MODERATE (HCC): Status: ACTIVE | Noted: 2024-02-13

## 2024-02-13 LAB
ANION GAP SERPL CALCULATED.3IONS-SCNC: 10 MMOL/L (ref 9–17)
BASOPHILS # BLD: 0.03 K/UL (ref 0–0.2)
BASOPHILS NFR BLD: 1 % (ref 0–2)
BILIRUB UR QL STRIP: NEGATIVE
BUN SERPL-MCNC: 14 MG/DL (ref 8–23)
BUN/CREAT SERPL: 23 (ref 9–20)
CALCIUM SERPL-MCNC: 9.2 MG/DL (ref 8.6–10.4)
CHLORIDE SERPL-SCNC: 102 MMOL/L (ref 98–107)
CLARITY UR: CLEAR
CO2 SERPL-SCNC: 29 MMOL/L (ref 20–31)
COLOR UR: YELLOW
CREAT SERPL-MCNC: 0.6 MG/DL (ref 0.5–0.9)
EKG ATRIAL RATE: 80 BPM
EKG P AXIS: 64 DEGREES
EKG P-R INTERVAL: 182 MS
EKG Q-T INTERVAL: 384 MS
EKG QRS DURATION: 82 MS
EKG QTC CALCULATION (BAZETT): 442 MS
EKG R AXIS: 3 DEGREES
EKG T AXIS: 53 DEGREES
EKG VENTRICULAR RATE: 80 BPM
EOSINOPHIL # BLD: <0.03 K/UL (ref 0–0.44)
EOSINOPHILS RELATIVE PERCENT: 0 % (ref 1–4)
ERYTHROCYTE [DISTWIDTH] IN BLOOD BY AUTOMATED COUNT: 13.5 % (ref 11.8–14.4)
GFR SERPL CREATININE-BSD FRML MDRD: >60 ML/MIN/1.73M2
GLUCOSE SERPL-MCNC: 122 MG/DL (ref 70–99)
GLUCOSE UR STRIP-MCNC: NEGATIVE MG/DL
HCT VFR BLD AUTO: 40.1 % (ref 36.3–47.1)
HGB BLD-MCNC: 13.3 G/DL (ref 11.9–15.1)
HGB UR QL STRIP.AUTO: NEGATIVE
IMM GRANULOCYTES # BLD AUTO: <0.03 K/UL (ref 0–0.3)
IMM GRANULOCYTES NFR BLD: 0 %
INR PPP: >22.6
KETONES UR STRIP-MCNC: NEGATIVE MG/DL
LEUKOCYTE ESTERASE UR QL STRIP: NEGATIVE
LYMPHOCYTES NFR BLD: 1.62 K/UL (ref 1.1–3.7)
LYMPHOCYTES RELATIVE PERCENT: 31 % (ref 24–43)
MAGNESIUM SERPL-MCNC: 2 MG/DL (ref 1.6–2.6)
MCH RBC QN AUTO: 31.1 PG (ref 25.2–33.5)
MCHC RBC AUTO-ENTMCNC: 33.2 G/DL (ref 28.4–34.8)
MCV RBC AUTO: 93.9 FL (ref 82.6–102.9)
MONOCYTES NFR BLD: 0.55 K/UL (ref 0.1–1.2)
MONOCYTES NFR BLD: 10 % (ref 3–12)
NEUTROPHILS NFR BLD: 58 % (ref 36–65)
NEUTS SEG NFR BLD: 3.07 K/UL (ref 1.5–8.1)
NITRITE UR QL STRIP: NEGATIVE
NRBC BLD-RTO: 0 PER 100 WBC
PARTIAL THROMBOPLASTIN TIME: 29.4 SEC (ref 26.8–34.8)
PH UR STRIP: 6 [PH] (ref 5–9)
PLATELET # BLD AUTO: 230 K/UL (ref 138–453)
PMV BLD AUTO: 9.6 FL (ref 8.1–13.5)
POTASSIUM SERPL-SCNC: 3.9 MMOL/L (ref 3.7–5.3)
PROT UR STRIP-MCNC: NEGATIVE MG/DL
PROTHROMBIN TIME: 12.6 SEC (ref 11.9–14.8)
RBC # BLD AUTO: 4.27 M/UL (ref 3.95–5.11)
SODIUM SERPL-SCNC: 141 MMOL/L (ref 135–144)
SP GR UR STRIP: 1.02 (ref 1.01–1.02)
TROPONIN I SERPL HS-MCNC: 12 NG/L (ref 0–14)
UROBILINOGEN UR STRIP-ACNC: NORMAL EU/DL (ref 0–1)
WBC OTHER # BLD: 5.3 K/UL (ref 3.5–11.3)

## 2024-02-13 PROCEDURE — 93005 ELECTROCARDIOGRAM TRACING: CPT | Performed by: PHYSICIAN ASSISTANT

## 2024-02-13 PROCEDURE — G8420 CALC BMI NORM PARAMETERS: HCPCS | Performed by: PHYSICIAN ASSISTANT

## 2024-02-13 PROCEDURE — 93010 ELECTROCARDIOGRAM REPORT: CPT | Performed by: INTERNAL MEDICINE

## 2024-02-13 PROCEDURE — 85025 COMPLETE CBC W/AUTO DIFF WBC: CPT

## 2024-02-13 PROCEDURE — 3023F SPIROM DOC REV: CPT | Performed by: PHYSICIAN ASSISTANT

## 2024-02-13 PROCEDURE — 99284 EMERGENCY DEPT VISIT MOD MDM: CPT

## 2024-02-13 PROCEDURE — 99211 OFF/OP EST MAY X REQ PHY/QHP: CPT | Performed by: PHYSICIAN ASSISTANT

## 2024-02-13 PROCEDURE — G8484 FLU IMMUNIZE NO ADMIN: HCPCS | Performed by: PHYSICIAN ASSISTANT

## 2024-02-13 PROCEDURE — 1090F PRES/ABSN URINE INCON ASSESS: CPT | Performed by: PHYSICIAN ASSISTANT

## 2024-02-13 PROCEDURE — 80048 BASIC METABOLIC PNL TOTAL CA: CPT

## 2024-02-13 PROCEDURE — 85610 PROTHROMBIN TIME: CPT

## 2024-02-13 PROCEDURE — 83735 ASSAY OF MAGNESIUM: CPT

## 2024-02-13 PROCEDURE — 70450 CT HEAD/BRAIN W/O DYE: CPT

## 2024-02-13 PROCEDURE — G8399 PT W/DXA RESULTS DOCUMENT: HCPCS | Performed by: PHYSICIAN ASSISTANT

## 2024-02-13 PROCEDURE — 81003 URINALYSIS AUTO W/O SCOPE: CPT

## 2024-02-13 PROCEDURE — 1036F TOBACCO NON-USER: CPT | Performed by: PHYSICIAN ASSISTANT

## 2024-02-13 PROCEDURE — 84484 ASSAY OF TROPONIN QUANT: CPT

## 2024-02-13 PROCEDURE — 71250 CT THORAX DX C-: CPT

## 2024-02-13 PROCEDURE — G8427 DOCREV CUR MEDS BY ELIG CLIN: HCPCS | Performed by: PHYSICIAN ASSISTANT

## 2024-02-13 PROCEDURE — 85730 THROMBOPLASTIN TIME PARTIAL: CPT

## 2024-02-13 PROCEDURE — 99214 OFFICE O/P EST MOD 30 MIN: CPT | Performed by: PHYSICIAN ASSISTANT

## 2024-02-13 PROCEDURE — 36415 COLL VENOUS BLD VENIPUNCTURE: CPT

## 2024-02-13 PROCEDURE — 93005 ELECTROCARDIOGRAM TRACING: CPT

## 2024-02-13 PROCEDURE — 1123F ACP DISCUSS/DSCN MKR DOCD: CPT | Performed by: PHYSICIAN ASSISTANT

## 2024-02-13 PROCEDURE — 93010 ELECTROCARDIOGRAM REPORT: CPT | Performed by: PHYSICIAN ASSISTANT

## 2024-02-13 PROCEDURE — 72125 CT NECK SPINE W/O DYE: CPT

## 2024-02-13 PROCEDURE — 6370000000 HC RX 637 (ALT 250 FOR IP): Performed by: EMERGENCY MEDICINE

## 2024-02-13 RX ORDER — DOXYCYCLINE HYCLATE 100 MG/1
100 CAPSULE ORAL ONCE
Status: COMPLETED | OUTPATIENT
Start: 2024-02-13 | End: 2024-02-13

## 2024-02-13 RX ORDER — DOXYCYCLINE HYCLATE 100 MG
100 TABLET ORAL 2 TIMES DAILY
Qty: 9 TABLET | Refills: 0 | Status: SHIPPED | OUTPATIENT
Start: 2024-02-13 | End: 2024-02-18

## 2024-02-13 RX ORDER — AMOXICILLIN AND CLAVULANATE POTASSIUM 875; 125 MG/1; MG/1
1 TABLET, FILM COATED ORAL ONCE
Status: COMPLETED | OUTPATIENT
Start: 2024-02-13 | End: 2024-02-13

## 2024-02-13 RX ORDER — AMOXICILLIN AND CLAVULANATE POTASSIUM 875; 125 MG/1; MG/1
1 TABLET, FILM COATED ORAL 2 TIMES DAILY
Qty: 9 TABLET | Refills: 0 | Status: SHIPPED | OUTPATIENT
Start: 2024-02-13 | End: 2024-02-18

## 2024-02-13 RX ADMIN — AMOXICILLIN AND CLAVULANATE POTASSIUM 1 TABLET: 875; 125 TABLET, FILM COATED ORAL at 21:04

## 2024-02-13 RX ADMIN — DOXYCYCLINE HYCLATE 100 MG: 100 CAPSULE ORAL at 21:04

## 2024-02-13 ASSESSMENT — PAIN DESCRIPTION - ORIENTATION: ORIENTATION: LEFT;POSTERIOR

## 2024-02-13 ASSESSMENT — PAIN - FUNCTIONAL ASSESSMENT: PAIN_FUNCTIONAL_ASSESSMENT: 0-10

## 2024-02-13 ASSESSMENT — PAIN SCALES - GENERAL: PAINLEVEL_OUTOF10: 6

## 2024-02-13 ASSESSMENT — PAIN DESCRIPTION - LOCATION: LOCATION: HEAD

## 2024-02-13 ASSESSMENT — PAIN DESCRIPTION - PAIN TYPE: TYPE: ACUTE PAIN

## 2024-02-13 NOTE — ED PROVIDER NOTES
[facility] ED  Emergency Department Encounter  Emergency Medicine Attending     Pt Name:Toshia South  MRN: [id number]  Birthdate 1945  Date of evaluation: 2/13/24  PCP:  Jong Moraes MD  Note Started: 6:07 PM EST      CHIEF COMPLAINT       Chief Complaint   Patient presents with    Fall     Patient fell today while in bathroom hitting left back of head on edge of tub, patient states she thinks she was dizzy and went down.  Previous hx of brain bleed 2 years ago, on blood thinners (xarelto 20mg, stopped yesterday for injection)       HISTORY OF PRESENT ILLNESS  (Location/Symptom, Timing/Onset, Context/Setting, Quality, Duration, Modifying Factors, Severity.)      Toshia South is a 78 y.o. female who presents with fall.  Patient has history of intracranial hemorrhage.  On Xarelto for A-fib last dose was yesterday.  Patient states that she was in the bathroom today and felt dizzy and fell backwards and struck the back of her head against the edge of the tub and she struck her lower back against the tub as well.  Patient does have history of intracranial hemorrhage approximate 2 years ago.  Patient currently is endorsing left-sided head pain and low back pain.  She did ambulate afterwards.  Denies LOC.    PAST MEDICAL / SURGICAL / SOCIAL / FAMILY HISTORY      has a past medical history of A-fib (HCC), Anxiety, Atrial fibrillation (HCC), Biventricular congestive heart failure (HCC), Bronchiectasis with acute exacerbation (HCC), CAD (coronary artery disease), Chronic pain syndrome, COPD (chronic obstructive pulmonary disease) (HCC), Depression, GERD (gastroesophageal reflux disease), Hypothyroidism, Impaired fasting glucose, Iron deficiency anemia, Osteoporosis, Pulmonary fibrosis (HCC), and Subdural hematoma (HCC).     has a past surgical history that includes Hysterectomy (08/20/1991); Carpal tunnel release (Right, 12/17/1999); Total knee arthroplasty (Right, 03/19/2021); fracture  ordered.    Risk  Prescription drug management.          EKG  EKG reviewed and interpreted by me showing normal sinus rhythm with ventricular rate of 80 bpm.  No STEMI.    All EKG's are interpreted by the Emergency Department Physician who either signs or Co-signs this chart in the absence of a cardiologist.    RADIOLOGY:     CT head reviewed and interpreted by me showing no evidence of acute ICH, otherwise per radiology.    Interpretation per the Radiologist below, if available at the time of this note:    CT LUMBAR SPINE BONY RECONSTRUCTION   Final Result   1. No evidence of acute traumatic injury of the lumbar spine.   2. Multilevel degenerative changes, as detailed above.         CT CHEST ABDOMEN PELVIS WO CONTRAST Additional Contrast? None   Final Result   1. Patchy ground-glass infiltrates involving the upper lobes and right middle   and lower lobes concerning for pneumonia.   2. Hepatomegaly.   3. No evidence for acute traumatic injury to the chest, abdomen, or pelvis.         CT CERVICAL SPINE WO CONTRAST   Final Result   1. No evidence of acute fracture or traumatic malalignment cervical spine.   2. Extensive fibrotic and airspace abnormalities upper lungs re-identified,   somewhat increased, especially on the left.  Correlate with CT chest   (ordered).         CT Head WO Contrast   Final Result   1. No acute intracranial abnormality is identified.   2. Chronic microvascular ischemic white matter changes.   3. Status post prior left craniotomy.               EMERGENCY DEPARTMENT COURSE:           PROCEDURES:    Procedures    CONSULTS:  None        FINAL IMPRESSION      1. Closed head injury, initial encounter    2. Multifocal pneumonia          DISPOSITION / PLAN     DISPOSITION Decision To Discharge 02/13/2024 08:42:51 PM      PATIENT REFERRED TO:  Jong Moraes MD  [address] [place] [address]  [phone]    Schedule an appointment as soon as possible for a visit in 2

## 2024-02-13 NOTE — PROGRESS NOTES
I, Abbie Moncada am scribing for and in the presence of Yris Coyne PA-C.    Patient: Toshia South  : 1945  Date of Visit: 2023    History of Present Illness:        Dear Jong Moraes MD    REASON FOR VISIT / CONSULTATION:   Chief Complaint   Patient presents with    Follow-up     HX:CHF,sob,NICM,COPD,AFib Pt is here for yearly follow up she states she has back pain and cold, cardiac she is doing ok. SOB unchanged. Lightheaded denies any falls. Had brain surgery  after fall on  at [facility] Denies:CP, palp      Dear Jong Moraes MD    I had the pleasure of seeing Toshia South in my office today. Ms. South is a 77 y.o. female who presented for follow-up.    She was admitted to [facility] from 10/22/2020 to 2020 because of severe sepsis/septic shock secondary to bilateral pneumonia.  She was treated with Rocephin and azithromycin.  Hospital course complicated by acute respiratory failure requiring intubation.  Patient also found to have severe left ventricular systolic dysfunction with estimated ejection fraction of 30%.  She underwent cardiac catheterization that showed no significant CAD.  Patient treated for nonischemic cardiomyopathy and was discharged with LifeVest.  Repeat echo in 2021 showed ejection fraction 50%.  LifeVest discontinued.    Other medical problems include COPD, depression, gastroesophageal reflux disease and osteoporosis.      During that same hospital stay, she developed atrial fibrillation with rapid ventricular response on 10/23/2020.  Patient was treated with Lopressor, amiodarone in addition to anticoagulation with Xarelto.  She was also discharged with home oxygen therapy.    Cardiac Catheterization done on 10/22/2020 showed mild CAD.     Echocardiogram done on 2020 showed definity echo contrast was used to better assess left ventricular wall motion and thickness. Mild global hypokinesis with no

## 2024-02-14 NOTE — DISCHARGE INSTRUCTIONS
Return to the ED for severe headache, vision changes, vomiting, bleeding episodes, difficulty breathing or any other concerns.

## 2024-02-14 NOTE — CARE COORDINATION
Ambulatory Care Coordination  ED Follow up Call    Reason for ED visit:  fall   Status:     not changed    Did you call your PCP prior to going to the ED?  No      Did you receive a discharge instructions from the Emergency Room? Yes  Review of Instructions:     Understands what to report/when to return?:  Yes   Understands discharge instructions?:  Yes   Following discharge instructions?:  Yes   If not why? N/a    Are there any new complaints of pain? No  New Pain Meds? No    Constipation prophylaxis needed?  N/A    If you have a wound is the dressing clean, dry, and intact? N/A  Understands wound care regimen? N/A    Are there any other complaints/concerns that you wish to tell your provider?   Waiting on Call back from [facility] to see if she can still receive injection on Friday 2/16 FU appts/Provider:    Future Appointments   Date Time Provider Department Center   2/23/2024  9:30 AM [facility] ECHO 1 [facility]   3/4/2024 10:00 AM Boy Dennis MD TIFF PULM [facility]   4/29/2024  1:45 PM Jong Moraes MD [facility]    10/14/2024  2:45 PM Jong Moraes MD [facility]    2/13/2025 10:40 AM [facility] Min[facility]       New Medications?:   Yes      Medication Reconciliation by phone - Yes  Understands Medications?  Yes  Taking Medications? Yes  Can you swallow your pills?  Yes    Any further needs in the home i.e. Equipment?  No    Link to services in community?:  No   Which services:  n/a     Spoke with Toshia hansen who reports she is sore all over. Denied any headache, nausea/vomiting. Aware if these symptoms do appear to return to ED. States her son is picking up medications this afternoon and she will start them. She did not think she had pneumonia- has same cough as always but nothing worse than her normal. Denied shortness of breath. Is coughing up phlegm in morning but that is her normal. Waiting on call back from [facility] to see if

## 2024-02-14 NOTE — ED PROVIDER NOTES
Care assumed from Dr. Bailon pending results of imaging.    CT imaging of the head and spine demonstrates no evident injuries.  CT of the chest does reveal findings consistent with a multifocal pneumonia.  However, patient has no symptoms related to this.  INR is markedly elevated, the patient has no bleeding complaints, no history of liver failure and does not on warfarin.    Given the CT findings and the INR, case was discussed with patient's PCP, Dr. Moraes.  Decision was made at that time to discharge on antibiotic therapy for the possible pneumonia.  We suspect that the INR elevation is likely related to the patient's Xarelto use and does not reflect the degree of anticoagulation.    Patient is well-appearing.  No pulmonary complaints.  Normal work of breathing.  She is amenable to this plan.     Jorge Wharton MD  02/13/24 3075    Addendum: Lab notified us of QA issue. Re-calibrated analyzer; correct INR is 0.93.     Jorge Wharton MD  02/14/24 4015

## 2024-02-23 ENCOUNTER — HOSPITAL ENCOUNTER (OUTPATIENT)
Age: 79
End: 2024-02-23
Payer: MEDICARE

## 2024-02-23 VITALS
BODY MASS INDEX: 24.17 KG/M2 | WEIGHT: 145.06 LBS | HEIGHT: 65 IN | SYSTOLIC BLOOD PRESSURE: 155 MMHG | DIASTOLIC BLOOD PRESSURE: 75 MMHG

## 2024-02-23 DIAGNOSIS — Z01.818 PREOPERATIVE CLEARANCE: ICD-10-CM

## 2024-02-23 DIAGNOSIS — R06.02 SOB (SHORTNESS OF BREATH): ICD-10-CM

## 2024-02-23 DIAGNOSIS — I50.42 CHRONIC COMBINED SYSTOLIC AND DIASTOLIC CONGESTIVE HEART FAILURE (HCC): ICD-10-CM

## 2024-02-23 LAB
ECHO AO ROOT DIAM: 3 CM
ECHO AO ROOT INDEX: 1.73 CM/M2
ECHO AV ACCELERATION TIME: 107.74 MS
ECHO AV CUSP MM: 2.3 CM
ECHO AV MEAN GRADIENT: 4 MMHG
ECHO AV MEAN VELOCITY: 1 M/S
ECHO AV PEAK VELOCITY: 1.3 M/S
ECHO AV VTI: 32.8 CM
ECHO BSA: 1.74 M2
ECHO EST RA PRESSURE: 15 MMHG
ECHO LA DIAMETER INDEX: 2.25 CM/M2
ECHO LA DIAMETER: 3.9 CM
ECHO LA MAJOR AXIS: 6.6 CM
ECHO LA TO AORTIC ROOT RATIO: 1.3
ECHO LA VOL BP: 65 ML (ref 22–52)
ECHO LA VOL MOD A2C: 67 ML (ref 22–52)
ECHO LA VOL MOD A4C: 64 ML (ref 22–52)
ECHO LA VOL/BSA BIPLANE: 38 ML/M2 (ref 16–34)
ECHO LA VOLUME AREA LENGTH: 69 ML
ECHO LA VOLUME INDEX AREA LENGTH: 40 ML/M2 (ref 16–34)
ECHO LA VOLUME INDEX MOD A2C: 39 ML/M2 (ref 16–34)
ECHO LA VOLUME INDEX MOD A4C: 37 ML/M2 (ref 16–34)
ECHO LV E' LATERAL VELOCITY: 11 CM/S
ECHO LV EDV A2C: 74 ML
ECHO LV EDV A4C: 65 ML
ECHO LV EDV BP: 70 ML (ref 56–104)
ECHO LV EDV INDEX A4C: 38 ML/M2
ECHO LV EDV INDEX BP: 40 ML/M2
ECHO LV EDV NDEX A2C: 43 ML/M2
ECHO LV EJECTION FRACTION BIPLANE: 72 % (ref 55–100)
ECHO LV ESV A2C: 16 ML
ECHO LV ESV A4C: 24 ML
ECHO LV ESV BP: 20 ML (ref 19–49)
ECHO LV ESV INDEX A2C: 9 ML/M2
ECHO LV ESV INDEX A4C: 14 ML/M2
ECHO LV ESV INDEX BP: 12 ML/M2
ECHO LV FRACTIONAL SHORTENING: 23 % (ref 28–44)
ECHO LV INTERNAL DIMENSION DIASTOLE INDEX: 1.73 CM/M2
ECHO LV INTERNAL DIMENSION DIASTOLIC: 3 CM (ref 3.9–5.3)
ECHO LV INTERNAL DIMENSION SYSTOLIC INDEX: 1.33 CM/M2
ECHO LV INTERNAL DIMENSION SYSTOLIC: 2.3 CM
ECHO LV IVSD: 1.4 CM (ref 0.6–0.9)
ECHO LV IVSS: 1.6 CM
ECHO LV MASS 2D: 102 G (ref 67–162)
ECHO LV MASS INDEX 2D: 59 G/M2 (ref 43–95)
ECHO LV POSTERIOR WALL DIASTOLIC: 0.9 CM (ref 0.6–0.9)
ECHO LV POSTERIOR WALL SYSTOLIC: 1.3 CM
ECHO LV RELATIVE WALL THICKNESS RATIO: 0.6
ECHO LVOT AV VTI INDEX: 1.06
ECHO LVOT MEAN GRADIENT: 3 MMHG
ECHO LVOT VTI: 34.9 CM
ECHO MV A VELOCITY: 0.85 M/S
ECHO MV E DECELERATION TIME (DT): 230.2 MS
ECHO MV E VELOCITY: 0.84 M/S
ECHO MV E/A RATIO: 0.99
ECHO MV E/E' LATERAL: 7.64
ECHO PV MAX VELOCITY: 0.7 M/S
ECHO PV PEAK GRADIENT: 2 MMHG
ECHO RIGHT VENTRICULAR SYSTOLIC PRESSURE (RVSP): 49 MMHG
ECHO TV REGURGITANT MAX VELOCITY: 2.93 M/S
ECHO TV REGURGITANT PEAK GRADIENT: 34 MMHG

## 2024-02-23 PROCEDURE — 93306 TTE W/DOPPLER COMPLETE: CPT | Performed by: FAMILY MEDICINE

## 2024-02-23 PROCEDURE — 93306 TTE W/DOPPLER COMPLETE: CPT

## 2024-02-26 ENCOUNTER — CARE COORDINATION (OUTPATIENT)
Dept: CARE COORDINATION | Age: 79
End: 2024-02-26

## 2024-02-26 ENCOUNTER — TELEPHONE (OUTPATIENT)
Dept: CARDIOLOGY | Age: 79
End: 2024-02-26

## 2024-02-26 NOTE — TELEPHONE ENCOUNTER
----- Message from Yris Coyne PA-C sent at 2/26/2024  8:28 AM EST -----  Please let them know their echo looks good, no change since last year. We will discuss at follow up appointment. Thanks.

## 2024-02-26 NOTE — CARE COORDINATION
Ambulatory Care Coordination Note  2024    Patient Current Location:  Home: [address]  [place] [address]     ACM contacted the patient and spouse/partner by telephone. Verified name and  with patient and spouse/partner as identifiers. Provided introduction to self, and explanation of the ACM role.     Challenges to be reviewed by the provider   Additional needs identified to be addressed with provider: No  none               Method of communication with provider: none.    ACM: Delphine Gao, RN    CC outreach call placed to patient. Spoke with patient and spouse. Reports she is doing well. Had injections as planned on . Has appointment with Dr. Ramirez on 3/4 to replace pain pump at [facility]. No further falls since ED visit on . SDOH reviewed and no needs noted currently. States they are doing well currently.     Offered patient enrollment in the Remote Patient Monitoring (RPM) program for in-home monitoring: Patient declined.    Future Appointments   Date Time Provider Department Center   2024  1:45 PM Jong Moraes MD [facility]    10/14/2024  2:45 PM Jong Moraes MD [facility]    2025 10:40 AM Jose Miner MD [facility] CARD [facility]     Care Coordination Plan of Care:   This nurse Care Coordinator will  - plan to graduate from care coordination as no further needs noted at this time   -remove name from care team

## 2024-02-26 NOTE — RESULT ENCOUNTER NOTE
Please let them know their echo looks good, no change since last year. We will discuss at follow up appointment. Thanks.

## 2024-03-03 ENCOUNTER — HOSPITAL ENCOUNTER (EMERGENCY)
Age: 79
Discharge: HOME | End: 2024-03-03
Attending: EMERGENCY MEDICINE
Payer: MEDICARE

## 2024-03-03 ENCOUNTER — APPOINTMENT (OUTPATIENT)
Dept: GENERAL RADIOLOGY | Age: 79
End: 2024-03-03
Payer: MEDICARE

## 2024-03-03 VITALS
DIASTOLIC BLOOD PRESSURE: 95 MMHG | OXYGEN SATURATION: 96 % | WEIGHT: 145 LBS | HEART RATE: 84 BPM | SYSTOLIC BLOOD PRESSURE: 129 MMHG | HEIGHT: 65 IN | RESPIRATION RATE: 20 BRPM | BODY MASS INDEX: 24.16 KG/M2 | TEMPERATURE: 98.5 F

## 2024-03-03 DIAGNOSIS — U07.1 COVID-19: Primary | ICD-10-CM

## 2024-03-03 LAB
ANION GAP SERPL CALCULATED.3IONS-SCNC: 8 MMOL/L (ref 9–17)
BASOPHILS # BLD: <0.03 K/UL (ref 0–0.2)
BASOPHILS NFR BLD: 0 % (ref 0–2)
BNP SERPL-MCNC: 75 PG/ML
BUN SERPL-MCNC: 12 MG/DL (ref 8–23)
BUN/CREAT SERPL: 20 (ref 9–20)
CALCIUM SERPL-MCNC: 9.2 MG/DL (ref 8.6–10.4)
CHLORIDE SERPL-SCNC: 96 MMOL/L (ref 98–107)
CO2 SERPL-SCNC: 32 MMOL/L (ref 20–31)
CREAT SERPL-MCNC: 0.6 MG/DL (ref 0.5–0.9)
EOSINOPHIL # BLD: <0.03 K/UL (ref 0–0.44)
EOSINOPHILS RELATIVE PERCENT: 0 % (ref 1–4)
ERYTHROCYTE [DISTWIDTH] IN BLOOD BY AUTOMATED COUNT: 13.7 % (ref 11.8–14.4)
FLUAV AG SPEC QL: NEGATIVE
FLUBV AG SPEC QL: NEGATIVE
GFR SERPL CREATININE-BSD FRML MDRD: >60 ML/MIN/1.73M2
GLUCOSE SERPL-MCNC: 99 MG/DL (ref 70–99)
HCT VFR BLD AUTO: 42 % (ref 36.3–47.1)
HGB BLD-MCNC: 13.9 G/DL (ref 11.9–15.1)
IMM GRANULOCYTES # BLD AUTO: <0.03 K/UL (ref 0–0.3)
IMM GRANULOCYTES NFR BLD: 0 %
LYMPHOCYTES NFR BLD: 0.82 K/UL (ref 1.1–3.7)
LYMPHOCYTES RELATIVE PERCENT: 15 % (ref 24–43)
MCH RBC QN AUTO: 31.2 PG (ref 25.2–33.5)
MCHC RBC AUTO-ENTMCNC: 33.1 G/DL (ref 28.4–34.8)
MCV RBC AUTO: 94.2 FL (ref 82.6–102.9)
MONOCYTES NFR BLD: 0.64 K/UL (ref 0.1–1.2)
MONOCYTES NFR BLD: 12 % (ref 3–12)
NEUTROPHILS NFR BLD: 73 % (ref 36–65)
NEUTS SEG NFR BLD: 4.08 K/UL (ref 1.5–8.1)
NRBC BLD-RTO: 0 PER 100 WBC
PLATELET # BLD AUTO: 207 K/UL (ref 138–453)
PMV BLD AUTO: 10.1 FL (ref 8.1–13.5)
POTASSIUM SERPL-SCNC: 3.6 MMOL/L (ref 3.7–5.3)
RBC # BLD AUTO: 4.46 M/UL (ref 3.95–5.11)
SARS-COV-2 RDRP RESP QL NAA+PROBE: DETECTED
SODIUM SERPL-SCNC: 136 MMOL/L (ref 135–144)
SPECIMEN DESCRIPTION: ABNORMAL
TROPONIN I SERPL HS-MCNC: 15 NG/L (ref 0–14)
WBC OTHER # BLD: 5.6 K/UL (ref 3.5–11.3)

## 2024-03-03 PROCEDURE — 6370000000 HC RX 637 (ALT 250 FOR IP): Performed by: EMERGENCY MEDICINE

## 2024-03-03 PROCEDURE — 84484 ASSAY OF TROPONIN QUANT: CPT

## 2024-03-03 PROCEDURE — 71045 X-RAY EXAM CHEST 1 VIEW: CPT

## 2024-03-03 PROCEDURE — 96374 THER/PROPH/DIAG INJ IV PUSH: CPT

## 2024-03-03 PROCEDURE — 85025 COMPLETE CBC W/AUTO DIFF WBC: CPT

## 2024-03-03 PROCEDURE — 93005 ELECTROCARDIOGRAM TRACING: CPT | Performed by: EMERGENCY MEDICINE

## 2024-03-03 PROCEDURE — 87635 SARS-COV-2 COVID-19 AMP PRB: CPT

## 2024-03-03 PROCEDURE — 36415 COLL VENOUS BLD VENIPUNCTURE: CPT

## 2024-03-03 PROCEDURE — 87804 INFLUENZA ASSAY W/OPTIC: CPT

## 2024-03-03 PROCEDURE — 80048 BASIC METABOLIC PNL TOTAL CA: CPT

## 2024-03-03 PROCEDURE — 83880 ASSAY OF NATRIURETIC PEPTIDE: CPT

## 2024-03-03 PROCEDURE — 94664 DEMO&/EVAL PT USE INHALER: CPT

## 2024-03-03 PROCEDURE — 99285 EMERGENCY DEPT VISIT HI MDM: CPT

## 2024-03-03 PROCEDURE — 6360000002 HC RX W HCPCS: Performed by: EMERGENCY MEDICINE

## 2024-03-03 RX ORDER — PREDNISONE 20 MG/1
40 TABLET ORAL DAILY
Qty: 10 TABLET | Refills: 0 | Status: SHIPPED | OUTPATIENT
Start: 2024-03-03 | End: 2024-03-08

## 2024-03-03 RX ORDER — IPRATROPIUM BROMIDE AND ALBUTEROL SULFATE 2.5; .5 MG/3ML; MG/3ML
1 SOLUTION RESPIRATORY (INHALATION) ONCE
Status: COMPLETED | OUTPATIENT
Start: 2024-03-03 | End: 2024-03-03

## 2024-03-03 RX ORDER — WATER 10 ML/10ML
INJECTION INTRAMUSCULAR; INTRAVENOUS; SUBCUTANEOUS
Status: DISCONTINUED
Start: 2024-03-03 | End: 2024-03-03 | Stop reason: HOSPADM

## 2024-03-03 RX ORDER — METHYLPREDNISOLONE SODIUM SUCCINATE 125 MG/2ML
125 INJECTION, POWDER, LYOPHILIZED, FOR SOLUTION INTRAMUSCULAR; INTRAVENOUS ONCE
Status: COMPLETED | OUTPATIENT
Start: 2024-03-03 | End: 2024-03-03

## 2024-03-03 RX ADMIN — IPRATROPIUM BROMIDE AND ALBUTEROL SULFATE 1 DOSE: 2.5; .5 SOLUTION RESPIRATORY (INHALATION) at 09:45

## 2024-03-03 RX ADMIN — METHYLPREDNISOLONE SODIUM SUCCINATE 125 MG: 125 INJECTION INTRAMUSCULAR; INTRAVENOUS at 10:25

## 2024-03-03 ASSESSMENT — LIFESTYLE VARIABLES
HOW MANY STANDARD DRINKS CONTAINING ALCOHOL DO YOU HAVE ON A TYPICAL DAY: PATIENT DOES NOT DRINK
HOW OFTEN DO YOU HAVE A DRINK CONTAINING ALCOHOL: NEVER

## 2024-03-03 ASSESSMENT — PAIN - FUNCTIONAL ASSESSMENT: PAIN_FUNCTIONAL_ASSESSMENT: NONE - DENIES PAIN

## 2024-03-03 ASSESSMENT — PAIN SCALES - GENERAL: PAINLEVEL_OUTOF10: 0

## 2024-03-03 NOTE — ED PROVIDER NOTES
every 8 hours if needed for pain for up to 30 DAYS    IPRATROPIUM 0.5 MG-ALBUTEROL 2.5 MG (DUONEB) 0.5-2.5 (3) MG/3ML SOLN NEBULIZER SOLUTION    inhale contents of 1 vial ( 3 MLS ) in nebulizer by mouth and INTO THE LUNGS every 4 hours if needed for shortness of breath or wheezing    LEVOTHYROXINE (SYNTHROID) 200 MCG TABLET    TAKE 1 TABLET BY MOUTH  DAILY    MULTIPLE VITAMINS-MINERALS (THERAPEUTIC MULTIVITAMIN-MINERALS) TABLET    Take 1 tablet by mouth daily    NONFORMULARY    Pt on pump with Bupivacaine 6.178mg/day, Hydromorphone 3.089mg/day    OXYGEN    Inhale 2 L into the lungs continuous    PANTOPRAZOLE SODIUM (PROTONIX) 40 MG PACK PACKET    Take 1 packet by mouth 2 times daily (before meals)    POTASSIUM CHLORIDE (KLOR-CON M) 20 MEQ EXTENDED RELEASE TABLET    Take 1 tablet by mouth 2 times daily    PREGABALIN (LYRICA) 300 MG CAPSULE    take 1 capsule by mouth twice a day    SERTRALINE (ZOLOFT) 50 MG TABLET    Take 1 tablet by mouth daily    TIZANIDINE (ZANAFLEX) 4 MG TABLET    Take 1 tablet by mouth as needed    TRAZODONE (DESYREL) 100 MG TABLET    TAKE 2 TABLETS BY MOUTH AT NIGHT    UMECLIDINIUM-VILANTEROL (ANORO ELLIPTA) 62.5-25 MCG/INH AEPB INHALER    Inhale 1 puff into the lungs daily    VITAMIN C (ASCORBIC ACID) 500 MG TABLET    Take 1 tablet by mouth 2 times daily    XARELTO 20 MG TABS TABLET    TAKE 1 TABLET BY MOUTH DAILY  WITH BREAKFAST       ALLERGIES     Seasonal    FAMILY HISTORY       Family History   Problem Relation Age of Onset    Heart Attack Father 63    Heart Attack Sister     Heart Disease Brother           SOCIAL HISTORY       Social History     Socioeconomic History    Marital status:      Spouse name: None    Number of children: None    Years of education: None    Highest education level: None   Tobacco Use    Smoking status: Former     Current packs/day: 0.00     Average packs/day: 1 pack/day for 10.0 years (10.0 ttl pk-yrs)     Types: Cigarettes     Start date: 11/9/1966

## 2024-03-03 NOTE — ED TRIAGE NOTES
Pt c/o SOB, voice hoarse, increases with exertion, pt wears 2 L O2 per NC -PRN, Present for 3 days, hx of fever, Pt states \" thought I could kick it, scheduled to have surgery in am to have pain pump removed at [facility]\"

## 2024-03-03 NOTE — DISCHARGE INSTRUCTIONS
Take the prednisone as prescribed starting tomorrow.  You do not need any more prednisone today.  Make sure to stay on top of your breathing treatments every 4-6 hours for wheezing.  Return if you have any difficulty breathing.  Please call your surgeon's office tomorrow morning to let them know that you were tested positive for COVID-19 today to see if they would need to postpone your surgery.

## 2024-03-04 LAB
EKG ATRIAL RATE: 87 BPM
EKG P AXIS: 63 DEGREES
EKG P-R INTERVAL: 180 MS
EKG Q-T INTERVAL: 384 MS
EKG QRS DURATION: 82 MS
EKG QTC CALCULATION (BAZETT): 462 MS
EKG R AXIS: -18 DEGREES
EKG T AXIS: 43 DEGREES
EKG VENTRICULAR RATE: 87 BPM

## 2024-03-04 PROCEDURE — 93010 ELECTROCARDIOGRAM REPORT: CPT | Performed by: FAMILY MEDICINE

## 2024-04-03 NOTE — PROGRESS NOTES
Problem: PHYSICAL THERAPY ADULT  Goal: Performs mobility at highest level of function for planned discharge setting.  See evaluation for individualized goals.  Description: Treatment/Interventions: Functional transfer training, Elevations, LE strengthening/ROM, Endurance training, Therapeutic exercise, Patient/family training, Equipment eval/education, Bed mobility, Gait training, Compensatory technique education  Equipment Recommended: Walker       See flowsheet documentation for full assessment, interventions and recommendations.  Outcome: Progressing  Note: Prognosis: Good  Problem List: Decreased strength, Decreased endurance, Impaired balance, Decreased mobility, Decreased safety awareness, Decreased skin integrity, Orthopedic restrictions, Pain  Assessment: Pt was seen for a follow up PT visit today to improve functional mobility and progress towards goals. Pt was alert and agreeable to PT treatment session and was eager to participate. Educated pt on benefit of progressing mobility activity to improve activity tolerance. Compared to previous session, pt demonstrated improvement in functional mobility as evidenced by increased ambulation distance with decreased level of assistance needed. Pt also presents with improved pain levels after mobility training. Pt continues to present with decreased endurance and gait deviations. Pt would benefit from continued skilled PT to maximize functional mobility and activity tolerance. Recommend level I (maximum resource intensity) once medically cleared for discharge.  Barriers to Discharge: Inaccessible home environment (6 DONYA)     Rehab Resource Intensity Level, PT: I (Maximum Resource Intensity)    See flowsheet documentation for full assessment.         Patient awake and up in the chair. Patient to bathroom and then to bed to grab morning weight. Then back to chair. Denies any needs. Will continue to monitor.

## 2024-04-24 ENCOUNTER — HOSPITAL ENCOUNTER (OUTPATIENT)
Age: 79
Discharge: HOME | End: 2024-04-24
Payer: MEDICARE

## 2024-04-24 DIAGNOSIS — E78.2 MIXED HYPERLIPIDEMIA: ICD-10-CM

## 2024-04-24 DIAGNOSIS — E03.9 HYPOTHYROIDISM, UNSPECIFIED TYPE: ICD-10-CM

## 2024-04-24 DIAGNOSIS — R73.01 IMPAIRED FASTING GLUCOSE: ICD-10-CM

## 2024-04-24 LAB
ALT SERPL-CCNC: 11 U/L (ref 5–33)
ANION GAP SERPL CALCULATED.3IONS-SCNC: 7 MMOL/L (ref 9–17)
AST SERPL-CCNC: 18 U/L
BUN SERPL-MCNC: 13 MG/DL (ref 8–23)
BUN/CREAT SERPL: 22 (ref 9–20)
CALCIUM SERPL-MCNC: 9.3 MG/DL (ref 8.6–10.4)
CHLORIDE SERPL-SCNC: 101 MMOL/L (ref 98–107)
CHOLEST SERPL-MCNC: 181 MG/DL (ref 0–199)
CHOLESTEROL/HDL RATIO: 3
CO2 SERPL-SCNC: 30 MMOL/L (ref 20–31)
CREAT SERPL-MCNC: 0.6 MG/DL (ref 0.5–0.9)
ERYTHROCYTE [DISTWIDTH] IN BLOOD BY AUTOMATED COUNT: 13.5 % (ref 11.8–14.4)
EST. AVERAGE GLUCOSE BLD GHB EST-MCNC: 111 MG/DL
GFR SERPL CREATININE-BSD FRML MDRD: >90 ML/MIN/1.73M2
GLUCOSE SERPL-MCNC: 93 MG/DL (ref 70–99)
HBA1C MFR BLD: 5.5 % (ref 4–6)
HCT VFR BLD AUTO: 42.3 % (ref 36.3–47.1)
HDLC SERPL-MCNC: 63 MG/DL
HGB BLD-MCNC: 13.6 G/DL (ref 11.9–15.1)
LDLC SERPL CALC-MCNC: 107 MG/DL (ref 0–100)
MCH RBC QN AUTO: 30.7 PG (ref 25.2–33.5)
MCHC RBC AUTO-ENTMCNC: 32.2 G/DL (ref 28.4–34.8)
MCV RBC AUTO: 95.5 FL (ref 82.6–102.9)
NRBC BLD-RTO: 0 PER 100 WBC
PLATELET # BLD AUTO: 244 K/UL (ref 138–453)
PMV BLD AUTO: 9.3 FL (ref 8.1–13.5)
POTASSIUM SERPL-SCNC: 3.6 MMOL/L (ref 3.7–5.3)
RBC # BLD AUTO: 4.43 M/UL (ref 3.95–5.11)
SODIUM SERPL-SCNC: 138 MMOL/L (ref 135–144)
T3FREE SERPL-MCNC: 2.6 PG/ML (ref 2–4.4)
T4 FREE SERPL-MCNC: 1.1 NG/DL (ref 0.92–1.68)
TRIGL SERPL-MCNC: 53 MG/DL
TSH SERPL DL<=0.05 MIU/L-ACNC: 1.46 UIU/ML (ref 0.3–5)
VLDLC SERPL CALC-MCNC: 11 MG/DL
WBC OTHER # BLD: 3.7 K/UL (ref 3.5–11.3)

## 2024-04-24 PROCEDURE — 84481 FREE ASSAY (FT-3): CPT

## 2024-04-24 PROCEDURE — 84460 ALANINE AMINO (ALT) (SGPT): CPT

## 2024-04-24 PROCEDURE — 84450 TRANSFERASE (AST) (SGOT): CPT

## 2024-04-24 PROCEDURE — 80048 BASIC METABOLIC PNL TOTAL CA: CPT

## 2024-04-24 PROCEDURE — 85027 COMPLETE CBC AUTOMATED: CPT

## 2024-04-24 PROCEDURE — 84443 ASSAY THYROID STIM HORMONE: CPT

## 2024-04-24 PROCEDURE — 36415 COLL VENOUS BLD VENIPUNCTURE: CPT

## 2024-04-24 PROCEDURE — 80061 LIPID PANEL: CPT

## 2024-04-24 PROCEDURE — 84439 ASSAY OF FREE THYROXINE: CPT

## 2024-04-24 PROCEDURE — 83036 HEMOGLOBIN GLYCOSYLATED A1C: CPT

## 2024-05-01 ENCOUNTER — TELEPHONE (OUTPATIENT)
Age: 79
End: 2024-05-01

## 2024-05-31 NOTE — PROGRESS NOTES
Faxed orders to [facility] to get the IV medication set up for home on discharge.     BUZZ Restrepo [TextBox_4] : 83 yo F w/ hx of HTN, morbid obesity, NIDDM, pAF, hx of PE 3/2023, DURAN on CPAP, ? airway disease, GERD who developed SOB over several months, was found to have B/L PE's 3/2023, TTE unable to visualize RV, but was placed on A/C, readmitted to hospital 5 months later (8/2023) due to recurrence of her EPPS a/w wheezing/cough and tachycardia w/ hypoxia as well as anemia. Readmission felt to be multifactorial 2/2 anemia, asthma, ?CHF. She has been following with pulmonary (Dr. Keita), has had improvement in SOB but still not back to  baseline > 1 year after initial PE. Difficult to evaluate echocardiograms due to morbid obesity, concerned for underlying PH and referred to PH clinic due to continued hypoxia on exertion. Repeat CTAs have demonstrated minimal evidence of CTED (some areas of vascular pruning, but no overt webs or occlusions). Exercise capacity severely diminished from last year, now limited even with minimal activity.  Never smoker, no history of drug use. Remains on eliquis for AC, no adverse effects. No history of parenchymal lung disease or chronic hypoxemic conditions. History of anemia, from hemorrhoidal bleeding per her but has since recovered. No history of diet pills or supplements. Has been on diuretics for a prolonged period of time, does not feel it affects her symptoms. Decision made to pursue RHC as TTE unable to visualize RV and concern for PH.  RHC 5/9/24   PA=44/19/25  WP=9  PA sat 66% Ao sat 92%  CO/CI (Td) 4.63/2.22 , CO/CI (F) 4.75/2.28  TPG = 16, DPG= 10  PVR= 3.46 PVRi 7.2  Isolated precapillary pulmonary hypertension present despite adequate treatment of WHO Group II and III risk factors suggesting possible idiopathic PAH WHO Group I disease.  PH Regimen sildenafil 20 mg TID [started May 2024]  5-31-24 She feels better on the sildenafil. There is less tightness in her chest, breathing is easier. She is not as out of breath on stairs and is less fatigued in the morning doing her ADLs. BP has been 110 -133, 74 - 79  Weight stable.

## 2024-06-10 ENCOUNTER — APPOINTMENT (OUTPATIENT)
Dept: CT IMAGING | Age: 79
End: 2024-06-10
Payer: MEDICARE

## 2024-06-10 ENCOUNTER — APPOINTMENT (OUTPATIENT)
Dept: GENERAL RADIOLOGY | Age: 79
End: 2024-06-10
Payer: MEDICARE

## 2024-06-10 ENCOUNTER — HOSPITAL ENCOUNTER (INPATIENT)
Age: 79
LOS: 7 days | Discharge: HOME HEALTH | DRG: 871 | End: 2024-06-17
Attending: INTERNAL MEDICINE | Admitting: INTERNAL MEDICINE
Payer: MEDICARE

## 2024-06-10 ENCOUNTER — HOSPITAL ENCOUNTER (EMERGENCY)
Age: 79
Discharge: ANOTHER HOSPITAL | End: 2024-06-10
Attending: STUDENT IN AN ORGANIZED HEALTH CARE EDUCATION/TRAINING PROGRAM
Payer: MEDICARE

## 2024-06-10 ENCOUNTER — APPOINTMENT (OUTPATIENT)
Dept: GENERAL RADIOLOGY | Age: 79
DRG: 871 | End: 2024-06-10
Attending: INTERNAL MEDICINE
Payer: MEDICARE

## 2024-06-10 VITALS
OXYGEN SATURATION: 93 % | DIASTOLIC BLOOD PRESSURE: 55 MMHG | BODY MASS INDEX: 22.47 KG/M2 | RESPIRATION RATE: 20 BRPM | TEMPERATURE: 101.2 F | HEART RATE: 96 BPM | WEIGHT: 135 LBS | SYSTOLIC BLOOD PRESSURE: 120 MMHG

## 2024-06-10 DIAGNOSIS — A41.9 SEPTICEMIA (HCC): ICD-10-CM

## 2024-06-10 DIAGNOSIS — U07.1 COVID-19: ICD-10-CM

## 2024-06-10 DIAGNOSIS — J18.9 MULTIFOCAL PNEUMONIA: Primary | ICD-10-CM

## 2024-06-10 PROBLEM — J96.01 ACUTE RESPIRATORY FAILURE WITH HYPOXIA (HCC): Status: ACTIVE | Noted: 2024-06-10

## 2024-06-10 PROBLEM — J96.91 HYPOXIC RESPIRATORY FAILURE (HCC): Status: ACTIVE | Noted: 2024-06-10

## 2024-06-10 LAB
ACINETOBACTER CALCOACETICUS-BAUMANNII BY PCR: NOT DETECTED
ALBUMIN SERPL-MCNC: 4.3 G/DL (ref 3.5–5.2)
ALBUMIN/GLOB SERPL: 1.4 {RATIO} (ref 1–2.5)
ALP SERPL-CCNC: 84 U/L (ref 35–104)
ALT SERPL-CCNC: 14 U/L (ref 5–33)
ANION GAP SERPL CALC-SCNC: 13 MEQ/L (ref 8–16)
ANION GAP SERPL CALC-SCNC: 8 MEQ/L (ref 8–16)
ANION GAP SERPL CALCULATED.3IONS-SCNC: 10 MMOL/L (ref 9–17)
ARTERIAL PATENCY WRIST A: ABNORMAL
ARTERIAL PATENCY WRIST A: NO
AST SERPL-CCNC: 22 U/L
BASOPHILS # BLD: 0 K/UL (ref 0–0.2)
BASOPHILS NFR BLD: 0 % (ref 0–2)
BDY SITE: ABNORMAL
BILIRUB SERPL-MCNC: 0.4 MG/DL (ref 0.3–1.2)
BILIRUB UR QL STRIP: NEGATIVE
BLACTX-M ISLT/SPM QL: NOT DETECTED
BLAIMP ISLT/SPM QL: NOT DETECTED
BLAKPC ISLT/SPM QL: NOT DETECTED
BLAOXA-48-LIKE ISLT/SPM QL: NOT DETECTED
BLAVIM ISLT/SPM QL: NOT DETECTED
BNP SERPL-MCNC: 135 PG/ML
BODY TEMPERATURE: 37
BODY TEMPERATURE: 37
BUN SERPL-MCNC: 14 MG/DL (ref 7–22)
BUN SERPL-MCNC: 14 MG/DL (ref 7–22)
BUN SERPL-MCNC: 16 MG/DL (ref 8–23)
BUN/CREAT SERPL: 23 (ref 9–20)
C PNEUM DNA LOWER RESP QL NAA+NON-PROBE: NOT DETECTED
CALCIUM SERPL-MCNC: 8.3 MG/DL (ref 8.5–10.5)
CALCIUM SERPL-MCNC: 8.7 MG/DL (ref 8.5–10.5)
CALCIUM SERPL-MCNC: 9.4 MG/DL (ref 8.6–10.4)
CHLORIDE SERPL-SCNC: 100 MMOL/L (ref 98–107)
CHLORIDE SERPL-SCNC: 108 MEQ/L (ref 98–111)
CHLORIDE SERPL-SCNC: 110 MEQ/L (ref 98–111)
CLARITY UR: CLEAR
CO2 SERPL-SCNC: 20 MEQ/L (ref 23–33)
CO2 SERPL-SCNC: 25 MEQ/L (ref 23–33)
CO2 SERPL-SCNC: 29 MMOL/L (ref 20–31)
COLOR UR: YELLOW
CREAT SERPL-MCNC: 0.5 MG/DL (ref 0.4–1.2)
CREAT SERPL-MCNC: 0.6 MG/DL (ref 0.4–1.2)
CREAT SERPL-MCNC: 0.7 MG/DL (ref 0.5–0.9)
CRP SERPL-MCNC: 5.17 MG/DL (ref 0–1)
D DIMER PPP IA.FEU-MCNC: 4935 NG/ML FEU (ref 0–500)
ENTEROBACTER CLOACAE COMPLEX BY PCR: DETECTED
EOSINOPHIL # BLD: 0 K/UL (ref 0–0.44)
EOSINOPHILS RELATIVE PERCENT: 0 % (ref 1–4)
EPI CELLS #/AREA URNS HPF: ABNORMAL /HPF (ref 0–25)
ERYTHROCYTE [DISTWIDTH] IN BLOOD BY AUTOMATED COUNT: 13.6 % (ref 11.8–14.4)
ESCHERICHIA COLI BY PCR: DETECTED
FERRITIN SERPL IA-MCNC: 131 NG/ML (ref 10–291)
FIO2 ON VENT: 100 %
FIO2 ON VENT: 100 %
FLUAV RNA LOWER RESP QL NAA+NON-PROBE: NOT DETECTED
FLUBV RNA LOWER RESP QL NAA+NON-PROBE: NOT DETECTED
GAS FLOW.O2 O2 DELIVERY SYS: ABNORMAL L/MIN
GAS FLOW.O2 O2 DELIVERY SYS: ABNORMAL L/MIN
GFR SERPL CREATININE-BSD FRML MDRD: > 90 ML/MIN/1.73M2
GFR SERPL CREATININE-BSD FRML MDRD: > 90 ML/MIN/1.73M2
GFR, ESTIMATED: 89 ML/MIN/1.73M2
GLUCOSE SERPL-MCNC: 130 MG/DL (ref 70–108)
GLUCOSE SERPL-MCNC: 151 MG/DL (ref 70–99)
GLUCOSE SERPL-MCNC: 170 MG/DL (ref 70–108)
GLUCOSE UR STRIP-MCNC: NEGATIVE MG/DL
HADV DNA LOWER RESP QL NAA+NON-PROBE: NOT DETECTED
HAEMOPHILUS INFLUENZAE BY PCR: NOT DETECTED
HCO3 ARTERIAL: 22.5 MMOL/L (ref 22–26)
HCO3 VENOUS: 23.1 MMOL/L (ref 24–30)
HCOV RNA LOWER RESP QL NAA+NON-PROBE: ABNORMAL
HCT VFR BLD AUTO: 49.3 % (ref 36.3–47.1)
HGB BLD-MCNC: 16.1 G/DL (ref 11.9–15.1)
HGB UR QL STRIP.AUTO: NEGATIVE
HMPV RNA LOWER RESP QL NAA+NON-PROBE: NOT DETECTED
HPIV RNA LOWER RESP QL NAA+NON-PROBE: NOT DETECTED
IMM GRANULOCYTES # BLD AUTO: 0 K/UL (ref 0–0.3)
IMM GRANULOCYTES NFR BLD: 0 %
KETONES UR STRIP-MCNC: ABNORMAL MG/DL
KLEBSIELLA AEROGENES BY PCR: DETECTED
KLEBSIELLA OXYTOCA BY PCR: NOT DETECTED
KLEBSIELLA PNEUMONIAE GROUP BY PCR: DETECTED
L PNEUMO DNA LOWER RESP QL NAA+NON-PROBE: NOT DETECTED
LACTATE BLDV-SCNC: 2.8 MMOL/L (ref 0.5–2.2)
LACTATE SERPL-SCNC: 3.1 MMOL/L (ref 0.5–2)
LACTATE SERPL-SCNC: 3.5 MMOL/L (ref 0.5–2)
LACTATE SERPL-SCNC: 3.9 MMOL/L (ref 0.5–2)
LDH SERPL L TO P-CCNC: 274 U/L (ref 100–190)
LEUKOCYTE ESTERASE UR QL STRIP: NEGATIVE
LYMPHOCYTES NFR BLD: 0.38 K/UL (ref 1.1–3.7)
LYMPHOCYTES RELATIVE PERCENT: 32 % (ref 24–43)
M PNEUMO DNA LOWER RESP QL NAA+NON-PROBE: NOT DETECTED
MAGNESIUM SERPL-MCNC: 1.6 MG/DL (ref 1.6–2.6)
MAGNESIUM SERPL-MCNC: 1.9 MG/DL (ref 1.6–2.4)
MAGNESIUM SERPL-MCNC: 2.2 MG/DL (ref 1.6–2.4)
MCH RBC QN AUTO: 31.2 PG (ref 25.2–33.5)
MCHC RBC AUTO-ENTMCNC: 32.7 G/DL (ref 28.4–34.8)
MCV RBC AUTO: 95.5 FL (ref 82.6–102.9)
MONOCYTES NFR BLD: 0.08 K/UL (ref 0.1–1.2)
MONOCYTES NFR BLD: 7 % (ref 3–12)
MORAXELLA CATARRHALIS BY PCR: NOT DETECTED
MORPHOLOGY: NORMAL
MRSA DNA SPEC QL NAA+PROBE: POSITIVE
NEGATIVE BASE EXCESS, ART: 4.8 MMOL/L (ref 0–2)
NEGATIVE BASE EXCESS, VEN: 4.8 MMOL/L (ref 0–2)
NEUTROPHILS NFR BLD: 61 % (ref 36–65)
NEUTS SEG NFR BLD: 0.74 K/UL (ref 1.5–8.1)
NITRITE UR QL STRIP: NEGATIVE
NRBC BLD-RTO: 0 PER 100 WBC
O2 SAT, ARTERIAL: 99.5 % (ref 95–98)
O2 SAT, VEN: 30.6 % (ref 60–85)
PCO2 ARTERIAL: 49.8 MMHG (ref 35–45)
PCO2, ART, TEMP ADJ: 49.8 (ref 35–45)
PCO2, VEN, TEMP ADJ: 53.8 MMHG (ref 39–55)
PCO2, VEN: 53.8 MM HG (ref 39–55)
PH ARTERIAL: 7.27 (ref 7.35–7.45)
PH UR STRIP: 6 [PH] (ref 5–9)
PH VENOUS: 7.25 (ref 7.32–7.42)
PH, ART, TEMP ADJ: 7.27 (ref 7.35–7.45)
PH, VEN, TEMP ADJ: 7.25 (ref 7.32–7.42)
PHOSPHATE SERPL-MCNC: 2.8 MG/DL (ref 2.4–4.7)
PHOSPHATE SERPL-MCNC: 3.2 MG/DL (ref 2.4–4.7)
PLATELET # BLD AUTO: 204 K/UL (ref 138–453)
PMV BLD AUTO: 9.5 FL (ref 8.1–13.5)
PO2 ARTERIAL: 247.2 MMHG (ref 80–100)
PO2, ART, TEMP ADJ: 247.2 MMHG (ref 80–100)
PO2, VEN, TEMP ADJ: 22.5 MMHG (ref 30–50)
PO2, VEN: 22.5 MM HG (ref 30–50)
POTASSIUM SERPL-SCNC: 3.3 MMOL/L (ref 3.7–5.3)
POTASSIUM SERPL-SCNC: 3.7 MEQ/L (ref 3.5–5.2)
POTASSIUM SERPL-SCNC: 4.2 MEQ/L (ref 3.5–5.2)
PROCALCITONIN SERPL IA-MCNC: 23.07 NG/ML (ref 0.01–0.09)
PROT SERPL-MCNC: 7.3 G/DL (ref 6.4–8.3)
PROT UR STRIP-MCNC: NEGATIVE MG/DL
PROTEUS SPECIES BY PCR: NOT DETECTED
PSEUDOMONAS AERUGINOSA BY PCR: NOT DETECTED
RBC # BLD AUTO: 5.16 M/UL (ref 3.95–5.11)
RBC #/AREA URNS HPF: ABNORMAL /HPF (ref 0–2)
RESISTANT GENE MECA/C & MREJ BY PCR: DETECTED
RESISTANT GENE NDM BY PCR: NOT DETECTED
RSV RNA LOWER RESP QL NAA+NON-PROBE: NOT DETECTED
RV+EV RNA LOWER RESP QL NAA+NON-PROBE: NOT DETECTED
SARS-COV-2 RDRP RESP QL NAA+PROBE: DETECTED
SERRATIA MARCESCENS BY PCR: NOT DETECTED
SODIUM SERPL-SCNC: 139 MMOL/L (ref 135–144)
SODIUM SERPL-SCNC: 141 MEQ/L (ref 135–145)
SODIUM SERPL-SCNC: 143 MEQ/L (ref 135–145)
SOURCE: ABNORMAL
SP GR UR STRIP: >1.03 (ref 1.01–1.02)
SPECIMEN ACCEPTABILITY: ABNORMAL
SPECIMEN DESCRIPTION: ABNORMAL
STAPH AUREUS BY PCR: DETECTED
STREP AGALACTIAE BY PCR: DETECTED
STREP PNEUMONIAE BY PCR: NOT DETECTED
STREP PYOGENES BY PCR: NOT DETECTED
TEXT FOR RESPIRATORY: ABNORMAL
TROPONIN I SERPL HS-MCNC: 13 NG/L (ref 0–14)
UROBILINOGEN UR STRIP-ACNC: NORMAL EU/DL (ref 0–1)
WBC #/AREA URNS HPF: ABNORMAL /HPF (ref 0–5)
WBC OTHER # BLD: 1.2 K/UL (ref 3.5–11.3)

## 2024-06-10 PROCEDURE — 74018 RADEX ABDOMEN 1 VIEW: CPT

## 2024-06-10 PROCEDURE — XW0DXM6 INTRODUCTION OF BARICITINIB INTO MOUTH AND PHARYNX, EXTERNAL APPROACH, NEW TECHNOLOGY GROUP 6: ICD-10-PCS | Performed by: INTERNAL MEDICINE

## 2024-06-10 PROCEDURE — 87798 DETECT AGENT NOS DNA AMP: CPT

## 2024-06-10 PROCEDURE — 84145 PROCALCITONIN (PCT): CPT

## 2024-06-10 PROCEDURE — 84484 ASSAY OF TROPONIN QUANT: CPT

## 2024-06-10 PROCEDURE — 0BH17EZ INSERTION OF ENDOTRACHEAL AIRWAY INTO TRACHEA, VIA NATURAL OR ARTIFICIAL OPENING: ICD-10-PCS | Performed by: INTERNAL MEDICINE

## 2024-06-10 PROCEDURE — 3E0G76Z INTRODUCTION OF NUTRITIONAL SUBSTANCE INTO UPPER GI, VIA NATURAL OR ARTIFICIAL OPENING: ICD-10-PCS | Performed by: INTERNAL MEDICINE

## 2024-06-10 PROCEDURE — 83615 LACTATE (LD) (LDH) ENZYME: CPT

## 2024-06-10 PROCEDURE — 80048 BASIC METABOLIC PNL TOTAL CA: CPT

## 2024-06-10 PROCEDURE — 0DH67UZ INSERTION OF FEEDING DEVICE INTO STOMACH, VIA NATURAL OR ARTIFICIAL OPENING: ICD-10-PCS | Performed by: INTERNAL MEDICINE

## 2024-06-10 PROCEDURE — 87186 SC STD MICRODIL/AGAR DIL: CPT

## 2024-06-10 PROCEDURE — 82728 ASSAY OF FERRITIN: CPT

## 2024-06-10 PROCEDURE — 94664 DEMO&/EVAL PT USE INHALER: CPT

## 2024-06-10 PROCEDURE — 83605 ASSAY OF LACTIC ACID: CPT

## 2024-06-10 PROCEDURE — 6360000002 HC RX W HCPCS: Performed by: STUDENT IN AN ORGANIZED HEALTH CARE EDUCATION/TRAINING PROGRAM

## 2024-06-10 PROCEDURE — 6360000002 HC RX W HCPCS

## 2024-06-10 PROCEDURE — C9113 INJ PANTOPRAZOLE SODIUM, VIA: HCPCS

## 2024-06-10 PROCEDURE — 2580000003 HC RX 258: Performed by: STUDENT IN AN ORGANIZED HEALTH CARE EDUCATION/TRAINING PROGRAM

## 2024-06-10 PROCEDURE — 80053 COMPREHEN METABOLIC PANEL: CPT

## 2024-06-10 PROCEDURE — 85025 COMPLETE CBC W/AUTO DIFF WBC: CPT

## 2024-06-10 PROCEDURE — 6370000000 HC RX 637 (ALT 250 FOR IP): Performed by: STUDENT IN AN ORGANIZED HEALTH CARE EDUCATION/TRAINING PROGRAM

## 2024-06-10 PROCEDURE — 81001 URINALYSIS AUTO W/SCOPE: CPT

## 2024-06-10 PROCEDURE — 6370000000 HC RX 637 (ALT 250 FOR IP)

## 2024-06-10 PROCEDURE — 5A1945Z RESPIRATORY VENTILATION, 24-96 CONSECUTIVE HOURS: ICD-10-PCS | Performed by: INTERNAL MEDICINE

## 2024-06-10 PROCEDURE — 2580000003 HC RX 258

## 2024-06-10 PROCEDURE — 83880 ASSAY OF NATRIURETIC PEPTIDE: CPT

## 2024-06-10 PROCEDURE — 87086 URINE CULTURE/COLONY COUNT: CPT

## 2024-06-10 PROCEDURE — 87641 MR-STAPH DNA AMP PROBE: CPT

## 2024-06-10 PROCEDURE — 96375 TX/PRO/DX INJ NEW DRUG ADDON: CPT

## 2024-06-10 PROCEDURE — 89220 SPUTUM SPECIMEN COLLECTION: CPT

## 2024-06-10 PROCEDURE — 94660 CPAP INITIATION&MGMT: CPT

## 2024-06-10 PROCEDURE — 93005 ELECTROCARDIOGRAM TRACING: CPT | Performed by: STUDENT IN AN ORGANIZED HEALTH CARE EDUCATION/TRAINING PROGRAM

## 2024-06-10 PROCEDURE — 87486 CHLMYD PNEUM DNA AMP PROBE: CPT

## 2024-06-10 PROCEDURE — 71045 X-RAY EXAM CHEST 1 VIEW: CPT

## 2024-06-10 PROCEDURE — 87077 CULTURE AEROBIC IDENTIFY: CPT

## 2024-06-10 PROCEDURE — 94761 N-INVAS EAR/PLS OXIMETRY MLT: CPT

## 2024-06-10 PROCEDURE — 87070 CULTURE OTHR SPECIMN AEROBIC: CPT

## 2024-06-10 PROCEDURE — 99291 CRITICAL CARE FIRST HOUR: CPT | Performed by: INTERNAL MEDICINE

## 2024-06-10 PROCEDURE — 2700000000 HC OXYGEN THERAPY PER DAY

## 2024-06-10 PROCEDURE — 87631 RESP VIRUS 3-5 TARGETS: CPT

## 2024-06-10 PROCEDURE — 6370000000 HC RX 637 (ALT 250 FOR IP): Performed by: INTERNAL MEDICINE

## 2024-06-10 PROCEDURE — 83735 ASSAY OF MAGNESIUM: CPT

## 2024-06-10 PROCEDURE — 87205 SMEAR GRAM STAIN: CPT

## 2024-06-10 PROCEDURE — 87040 BLOOD CULTURE FOR BACTERIA: CPT

## 2024-06-10 PROCEDURE — 82805 BLOOD GASES W/O2 SATURATION: CPT

## 2024-06-10 PROCEDURE — 2500000003 HC RX 250 WO HCPCS: Performed by: EMERGENCY MEDICINE

## 2024-06-10 PROCEDURE — 36415 COLL VENOUS BLD VENIPUNCTURE: CPT

## 2024-06-10 PROCEDURE — 92950 HEART/LUNG RESUSCITATION CPR: CPT

## 2024-06-10 PROCEDURE — 85379 FIBRIN DEGRADATION QUANT: CPT

## 2024-06-10 PROCEDURE — 96368 THER/DIAG CONCURRENT INF: CPT

## 2024-06-10 PROCEDURE — 2500000003 HC RX 250 WO HCPCS

## 2024-06-10 PROCEDURE — 3E033XZ INTRODUCTION OF VASOPRESSOR INTO PERIPHERAL VEIN, PERCUTANEOUS APPROACH: ICD-10-PCS | Performed by: INTERNAL MEDICINE

## 2024-06-10 PROCEDURE — 87150 DNA/RNA AMPLIFIED PROBE: CPT

## 2024-06-10 PROCEDURE — 2100000000 HC CCU R&B

## 2024-06-10 PROCEDURE — 84100 ASSAY OF PHOSPHORUS: CPT

## 2024-06-10 PROCEDURE — 96365 THER/PROPH/DIAG IV INF INIT: CPT

## 2024-06-10 PROCEDURE — 94002 VENT MGMT INPAT INIT DAY: CPT

## 2024-06-10 PROCEDURE — 31500 INSERT EMERGENCY AIRWAY: CPT

## 2024-06-10 PROCEDURE — 36600 WITHDRAWAL OF ARTERIAL BLOOD: CPT

## 2024-06-10 PROCEDURE — 2500000003 HC RX 250 WO HCPCS: Performed by: STUDENT IN AN ORGANIZED HEALTH CARE EDUCATION/TRAINING PROGRAM

## 2024-06-10 PROCEDURE — 94644 CONT INHLJ TX 1ST HOUR: CPT

## 2024-06-10 PROCEDURE — 87635 SARS-COV-2 COVID-19 AMP PRB: CPT

## 2024-06-10 PROCEDURE — 96376 TX/PRO/DX INJ SAME DRUG ADON: CPT

## 2024-06-10 PROCEDURE — 99285 EMERGENCY DEPT VISIT HI MDM: CPT

## 2024-06-10 PROCEDURE — 87541 LEGION PNEUMO DNA AMP PROB: CPT

## 2024-06-10 PROCEDURE — 86140 C-REACTIVE PROTEIN: CPT

## 2024-06-10 PROCEDURE — 87581 M.PNEUMON DNA AMP PROBE: CPT

## 2024-06-10 PROCEDURE — 96367 TX/PROPH/DG ADDL SEQ IV INF: CPT

## 2024-06-10 PROCEDURE — 96366 THER/PROPH/DIAG IV INF ADDON: CPT

## 2024-06-10 RX ORDER — POTASSIUM CHLORIDE 29.8 MG/ML
20 INJECTION INTRAVENOUS PRN
Status: DISCONTINUED | OUTPATIENT
Start: 2024-06-10 | End: 2024-06-17 | Stop reason: HOSPADM

## 2024-06-10 RX ORDER — FENTANYL CITRATE-0.9 % NACL/PF 20 MCG/2ML
50 SYRINGE (ML) INTRAVENOUS EVERY 30 MIN PRN
Status: DISCONTINUED | OUTPATIENT
Start: 2024-06-10 | End: 2024-06-10 | Stop reason: HOSPADM

## 2024-06-10 RX ORDER — PROPOFOL 10 MG/ML
5-50 INJECTION, EMULSION INTRAVENOUS CONTINUOUS
Status: DISCONTINUED | OUTPATIENT
Start: 2024-06-10 | End: 2024-06-12

## 2024-06-10 RX ORDER — ACETAMINOPHEN 325 MG/1
650 TABLET ORAL EVERY 6 HOURS PRN
Status: DISCONTINUED | OUTPATIENT
Start: 2024-06-10 | End: 2024-06-17 | Stop reason: HOSPADM

## 2024-06-10 RX ORDER — PANTOPRAZOLE SODIUM 40 MG/10ML
40 INJECTION, POWDER, LYOPHILIZED, FOR SOLUTION INTRAVENOUS DAILY
Status: DISCONTINUED | OUTPATIENT
Start: 2024-06-10 | End: 2024-06-11

## 2024-06-10 RX ORDER — MAGNESIUM SULFATE IN WATER 40 MG/ML
2000 INJECTION, SOLUTION INTRAVENOUS ONCE
Status: COMPLETED | OUTPATIENT
Start: 2024-06-10 | End: 2024-06-10

## 2024-06-10 RX ORDER — SODIUM CHLORIDE 9 MG/ML
INJECTION, SOLUTION INTRAVENOUS PRN
Status: DISCONTINUED | OUTPATIENT
Start: 2024-06-10 | End: 2024-06-17 | Stop reason: HOSPADM

## 2024-06-10 RX ORDER — DEXAMETHASONE SODIUM PHOSPHATE 10 MG/ML
6 INJECTION, SOLUTION INTRAMUSCULAR; INTRAVENOUS DAILY
Status: DISCONTINUED | OUTPATIENT
Start: 2024-06-10 | End: 2024-06-10 | Stop reason: ALTCHOICE

## 2024-06-10 RX ORDER — FENTANYL CITRATE-0.9 % NACL/PF 10 MCG/ML
25-200 PLASTIC BAG, INJECTION (ML) INTRAVENOUS CONTINUOUS
Status: DISCONTINUED | OUTPATIENT
Start: 2024-06-10 | End: 2024-06-10 | Stop reason: HOSPADM

## 2024-06-10 RX ORDER — CHLORHEXIDINE GLUCONATE ORAL RINSE 1.2 MG/ML
15 SOLUTION DENTAL 2 TIMES DAILY
Status: DISCONTINUED | OUTPATIENT
Start: 2024-06-10 | End: 2024-06-17 | Stop reason: HOSPADM

## 2024-06-10 RX ORDER — SODIUM CHLORIDE 0.9 % (FLUSH) 0.9 %
5-40 SYRINGE (ML) INJECTION PRN
Status: DISCONTINUED | OUTPATIENT
Start: 2024-06-10 | End: 2024-06-17 | Stop reason: HOSPADM

## 2024-06-10 RX ORDER — ONDANSETRON 2 MG/ML
4 INJECTION INTRAMUSCULAR; INTRAVENOUS EVERY 6 HOURS PRN
Status: DISCONTINUED | OUTPATIENT
Start: 2024-06-10 | End: 2024-06-17 | Stop reason: HOSPADM

## 2024-06-10 RX ORDER — WATER 10 ML/10ML
INJECTION INTRAMUSCULAR; INTRAVENOUS; SUBCUTANEOUS
Status: COMPLETED
Start: 2024-06-10 | End: 2024-06-10

## 2024-06-10 RX ORDER — DEXAMETHASONE SODIUM PHOSPHATE 4 MG/ML
6 INJECTION, SOLUTION INTRA-ARTICULAR; INTRALESIONAL; INTRAMUSCULAR; INTRAVENOUS; SOFT TISSUE DAILY
Status: DISCONTINUED | OUTPATIENT
Start: 2024-06-10 | End: 2024-06-11

## 2024-06-10 RX ORDER — MAGNESIUM SULFATE IN WATER 40 MG/ML
2000 INJECTION, SOLUTION INTRAVENOUS PRN
Status: DISCONTINUED | OUTPATIENT
Start: 2024-06-10 | End: 2024-06-17 | Stop reason: HOSPADM

## 2024-06-10 RX ORDER — DEXAMETHASONE SODIUM PHOSPHATE 4 MG/ML
4 INJECTION, SOLUTION INTRA-ARTICULAR; INTRALESIONAL; INTRAMUSCULAR; INTRAVENOUS; SOFT TISSUE DAILY
Status: DISCONTINUED | OUTPATIENT
Start: 2024-06-10 | End: 2024-06-10

## 2024-06-10 RX ORDER — ALBUTEROL SULFATE 2.5 MG/3ML
2.5 SOLUTION RESPIRATORY (INHALATION)
Status: DISCONTINUED | OUTPATIENT
Start: 2024-06-10 | End: 2024-06-10 | Stop reason: HOSPADM

## 2024-06-10 RX ORDER — ACETAMINOPHEN 650 MG/1
650 SUPPOSITORY RECTAL EVERY 6 HOURS PRN
Status: DISCONTINUED | OUTPATIENT
Start: 2024-06-10 | End: 2024-06-17 | Stop reason: HOSPADM

## 2024-06-10 RX ORDER — ONDANSETRON 4 MG/1
4 TABLET, ORALLY DISINTEGRATING ORAL EVERY 8 HOURS PRN
Status: DISCONTINUED | OUTPATIENT
Start: 2024-06-10 | End: 2024-06-17 | Stop reason: HOSPADM

## 2024-06-10 RX ORDER — NOREPINEPHRINE BITARTRATE 0.06 MG/ML
.01-3.3 INJECTION, SOLUTION INTRAVENOUS CONTINUOUS
Status: DISCONTINUED | OUTPATIENT
Start: 2024-06-10 | End: 2024-06-10 | Stop reason: HOSPADM

## 2024-06-10 RX ORDER — METHYLPREDNISOLONE SODIUM SUCCINATE 125 MG/2ML
125 INJECTION, POWDER, LYOPHILIZED, FOR SOLUTION INTRAMUSCULAR; INTRAVENOUS ONCE
Status: COMPLETED | OUTPATIENT
Start: 2024-06-10 | End: 2024-06-10

## 2024-06-10 RX ORDER — 0.9 % SODIUM CHLORIDE 0.9 %
2000 INTRAVENOUS SOLUTION INTRAVENOUS ONCE
Status: COMPLETED | OUTPATIENT
Start: 2024-06-10 | End: 2024-06-10

## 2024-06-10 RX ORDER — FENTANYL CITRATE-0.9 % NACL/PF 10 MCG/ML
25-200 PLASTIC BAG, INJECTION (ML) INTRAVENOUS CONTINUOUS
Status: DISCONTINUED | OUTPATIENT
Start: 2024-06-10 | End: 2024-06-12

## 2024-06-10 RX ORDER — LEVOTHYROXINE SODIUM 0.1 MG/1
200 TABLET ORAL DAILY
Status: DISCONTINUED | OUTPATIENT
Start: 2024-06-10 | End: 2024-06-17 | Stop reason: HOSPADM

## 2024-06-10 RX ORDER — ALBUTEROL SULFATE 2.5 MG/3ML
15 SOLUTION RESPIRATORY (INHALATION)
Status: DISCONTINUED | OUTPATIENT
Start: 2024-06-10 | End: 2024-06-10 | Stop reason: HOSPADM

## 2024-06-10 RX ORDER — PROPOFOL 10 MG/ML
5-50 INJECTION, EMULSION INTRAVENOUS CONTINUOUS
Status: DISCONTINUED | OUTPATIENT
Start: 2024-06-10 | End: 2024-06-10 | Stop reason: HOSPADM

## 2024-06-10 RX ORDER — ACETAMINOPHEN 650 MG/1
650 SUPPOSITORY RECTAL ONCE
Status: COMPLETED | OUTPATIENT
Start: 2024-06-10 | End: 2024-06-10

## 2024-06-10 RX ORDER — POTASSIUM CHLORIDE 7.45 MG/ML
10 INJECTION INTRAVENOUS PRN
Status: DISCONTINUED | OUTPATIENT
Start: 2024-06-10 | End: 2024-06-17 | Stop reason: HOSPADM

## 2024-06-10 RX ORDER — ALBUTEROL SULFATE 2.5 MG/3ML
2.5 SOLUTION RESPIRATORY (INHALATION) EVERY 4 HOURS PRN
Status: DISCONTINUED | OUTPATIENT
Start: 2024-06-10 | End: 2024-06-17 | Stop reason: HOSPADM

## 2024-06-10 RX ORDER — SODIUM CHLORIDE 0.9 % (FLUSH) 0.9 %
5-40 SYRINGE (ML) INJECTION EVERY 12 HOURS SCHEDULED
Status: DISCONTINUED | OUTPATIENT
Start: 2024-06-10 | End: 2024-06-17 | Stop reason: HOSPADM

## 2024-06-10 RX ORDER — ASPIRIN 81 MG/1
81 TABLET ORAL DAILY
Status: DISCONTINUED | OUTPATIENT
Start: 2024-06-10 | End: 2024-06-17 | Stop reason: HOSPADM

## 2024-06-10 RX ORDER — SUCCINYLCHOLINE CHLORIDE 20 MG/ML
100 INJECTION INTRAMUSCULAR; INTRAVENOUS ONCE
Status: COMPLETED | OUTPATIENT
Start: 2024-06-10 | End: 2024-06-10

## 2024-06-10 RX ORDER — NOREPINEPHRINE BITARTRATE 0.06 MG/ML
1-100 INJECTION, SOLUTION INTRAVENOUS CONTINUOUS
Status: DISCONTINUED | OUTPATIENT
Start: 2024-06-10 | End: 2024-06-12

## 2024-06-10 RX ORDER — POLYETHYLENE GLYCOL 3350 17 G/17G
17 POWDER, FOR SOLUTION ORAL DAILY PRN
Status: DISCONTINUED | OUTPATIENT
Start: 2024-06-10 | End: 2024-06-17 | Stop reason: HOSPADM

## 2024-06-10 RX ORDER — IPRATROPIUM BROMIDE AND ALBUTEROL SULFATE 2.5; .5 MG/3ML; MG/3ML
1 SOLUTION RESPIRATORY (INHALATION)
Status: DISCONTINUED | OUTPATIENT
Start: 2024-06-10 | End: 2024-06-10 | Stop reason: HOSPADM

## 2024-06-10 RX ORDER — SODIUM CHLORIDE 9 MG/ML
INJECTION, SOLUTION INTRAVENOUS CONTINUOUS
Status: DISCONTINUED | OUTPATIENT
Start: 2024-06-10 | End: 2024-06-15

## 2024-06-10 RX ORDER — ETOMIDATE 2 MG/ML
20 INJECTION INTRAVENOUS ONCE
Status: COMPLETED | OUTPATIENT
Start: 2024-06-10 | End: 2024-06-10

## 2024-06-10 RX ORDER — DEXAMETHASONE SODIUM PHOSPHATE 4 MG/ML
6 INJECTION, SOLUTION INTRA-ARTICULAR; INTRALESIONAL; INTRAMUSCULAR; INTRAVENOUS; SOFT TISSUE DAILY
Status: DISCONTINUED | OUTPATIENT
Start: 2024-06-10 | End: 2024-06-10 | Stop reason: ALTCHOICE

## 2024-06-10 RX ORDER — MORPHINE SULFATE 2 MG/ML
2 INJECTION, SOLUTION INTRAMUSCULAR; INTRAVENOUS EVERY 4 HOURS PRN
Status: DISCONTINUED | OUTPATIENT
Start: 2024-06-10 | End: 2024-06-17 | Stop reason: HOSPADM

## 2024-06-10 RX ADMIN — LEVOTHYROXINE SODIUM 200 MCG: 0.1 TABLET ORAL at 16:12

## 2024-06-10 RX ADMIN — Medication 150 MCG/HR: at 12:56

## 2024-06-10 RX ADMIN — PROPOFOL 20 MCG/KG/MIN: 10 INJECTION, EMULSION INTRAVENOUS at 06:22

## 2024-06-10 RX ADMIN — ETOMIDATE INJECTION 20 MG: 2 SOLUTION INTRAVENOUS at 06:20

## 2024-06-10 RX ADMIN — PROPOFOL 50 MCG/KG/MIN: 10 INJECTION, EMULSION INTRAVENOUS at 13:00

## 2024-06-10 RX ADMIN — Medication 125 MCG/HR: at 14:27

## 2024-06-10 RX ADMIN — Medication 50 MCG: at 06:31

## 2024-06-10 RX ADMIN — IPRATROPIUM BROMIDE 0.5 MG: 0.5 SOLUTION RESPIRATORY (INHALATION) at 05:50

## 2024-06-10 RX ADMIN — SODIUM CHLORIDE: 9 INJECTION, SOLUTION INTRAVENOUS at 21:57

## 2024-06-10 RX ADMIN — WATER 10 ML: 1 INJECTION INTRAMUSCULAR; INTRAVENOUS; SUBCUTANEOUS at 06:06

## 2024-06-10 RX ADMIN — SUCCINYLCHOLINE CHLORIDE 100 MG: 20 INJECTION, SOLUTION INTRAMUSCULAR; INTRAVENOUS at 06:20

## 2024-06-10 RX ADMIN — SODIUM CHLORIDE, PRESERVATIVE FREE 10 ML: 5 INJECTION INTRAVENOUS at 19:43

## 2024-06-10 RX ADMIN — SODIUM CHLORIDE 2000 ML: 9 INJECTION, SOLUTION INTRAVENOUS at 06:25

## 2024-06-10 RX ADMIN — ALBUTEROL SULFATE 15 MG: 2.5 SOLUTION RESPIRATORY (INHALATION) at 05:50

## 2024-06-10 RX ADMIN — Medication 50 MCG/HR: at 06:24

## 2024-06-10 RX ADMIN — ASPIRIN 81 MG: 81 TABLET, COATED ORAL at 16:12

## 2024-06-10 RX ADMIN — ACETAMINOPHEN 650 MG: 650 SUPPOSITORY RECTAL at 06:29

## 2024-06-10 RX ADMIN — AZITHROMYCIN MONOHYDRATE 500 MG: 500 INJECTION, POWDER, LYOPHILIZED, FOR SOLUTION INTRAVENOUS at 07:01

## 2024-06-10 RX ADMIN — RIVAROXABAN 20 MG: 20 TABLET, FILM COATED ORAL at 16:12

## 2024-06-10 RX ADMIN — PANTOPRAZOLE SODIUM 40 MG: 40 INJECTION, POWDER, FOR SOLUTION INTRAVENOUS at 14:14

## 2024-06-10 RX ADMIN — Medication 0.1 MCG/KG/MIN: at 07:10

## 2024-06-10 RX ADMIN — BARICITINIB 4 MG: 2 TABLET, FILM COATED ORAL at 16:13

## 2024-06-10 RX ADMIN — METHYLPREDNISOLONE SODIUM SUCCINATE 125 MG: 125 INJECTION INTRAMUSCULAR; INTRAVENOUS at 06:04

## 2024-06-10 RX ADMIN — MAGNESIUM SULFATE HEPTAHYDRATE 2000 MG: 40 INJECTION, SOLUTION INTRAVENOUS at 06:04

## 2024-06-10 RX ADMIN — DEXAMETHASONE SODIUM PHOSPHATE 6 MG: 4 INJECTION, SOLUTION INTRA-ARTICULAR; INTRALESIONAL; INTRAMUSCULAR; INTRAVENOUS; SOFT TISSUE at 18:35

## 2024-06-10 RX ADMIN — SODIUM CHLORIDE: 9 INJECTION, SOLUTION INTRAVENOUS at 14:20

## 2024-06-10 RX ADMIN — PROPOFOL 20 MCG/KG/MIN: 10 INJECTION, EMULSION INTRAVENOUS at 17:16

## 2024-06-10 RX ADMIN — Medication 15 MCG/MIN: at 12:57

## 2024-06-10 RX ADMIN — CHLORHEXIDINE GLUCONATE 0.12% ORAL RINSE 15 ML: 1.2 LIQUID ORAL at 19:43

## 2024-06-10 RX ADMIN — CEFTRIAXONE SODIUM 1000 MG: 1 INJECTION, POWDER, FOR SOLUTION INTRAMUSCULAR; INTRAVENOUS at 06:28

## 2024-06-10 ASSESSMENT — PULMONARY FUNCTION TESTS
PIF_VALUE: 26
PIF_VALUE: 36
PIF_VALUE: 22
PIF_VALUE: 25

## 2024-06-10 ASSESSMENT — PAIN SCALES - GENERAL: PAINLEVEL_OUTOF10: 0

## 2024-06-10 NOTE — ED NOTES
Pt intubated at 0622 with 7.5 ETT 26 placement at the teeth. Positive color change, condensation, bilateral breath sounds with equal chest rise noted.     Pt intubated with 20mg etomidate and 100mg succinylcholine.    Propofol and Fentanyl gtt initiated for sedation.

## 2024-06-10 NOTE — ED PROVIDER NOTES
Care assumed from Dr. Le at the conclusion of his clinical shift.     At that time patient was intubated, sedated on fentanyl/propofol, tolerating vent well.     Did become hypotensive, requiring push dose of epinephrine (20mcg) and subsequent initiation of Levophed.     Case discussed with HUB nurse at [facility], who has accepted on behalf of their ICU physician.         Jorge Wharton MD  06/10/24 4809

## 2024-06-10 NOTE — ED PROVIDER NOTES
[facility] ED  Emergency Department Encounter  Emergency Medicine Attending     Pt Name:Toshia South  MRN: [id number]  Birthdate 1945  Date of evaluation: 6/10/24  PCP:  Jong Moraes MD  Note Started: 5:41 AM EDT      CHIEF COMPLAINT       Chief Complaint   Patient presents with    Respiratory Distress     Onset of SOB and resp distress at 0300. Upon EMS arrival, pt is 74% on home 2L. Enroute pt received duoneb and albuterol treatment. SpO2 87% on arrival with breathing treatment in progress.        HISTORY OF PRESENT ILLNESS  (Location/Symptom, Timing/Onset, Context/Setting, Quality, Duration, Modifying Factors, Severity.)      Toshia South is a 78 y.o. female who presents with severe shortness of breath coming from home.  EMS was called out and found the patient to be altered, severely hypoxic saturating in the low 70s.  Patient is oxygen dependent at home for severe COPD, pulmonary fibrosis and congestive heart failure.  On arrival, patient was given multiple treatments to help with her oxygenation bring her saturation up to the low 80s and then up to the mid 80s low 90s.  Patient appears tired, nearly obtunded and unable to respond any more than shouting and some painful stimuli.    PAST MEDICAL / SURGICAL / SOCIAL / FAMILY HISTORY      has a past medical history of A-fib (HCC), Anxiety, Atrial fibrillation (HCC), Biventricular congestive heart failure (HCC), Bronchiectasis with acute exacerbation (HCC), CAD (coronary artery disease), Chronic pain syndrome, COPD (chronic obstructive pulmonary disease) (HCC), Depression, GERD (gastroesophageal reflux disease), Hypothyroidism, Impaired fasting glucose, Iron deficiency anemia, Osteoporosis, Pulmonary fibrosis (HCC), and Subdural hematoma (HCC).       has a past surgical history that includes Hysterectomy (08/20/1991); Carpal tunnel release (Right, 12/17/1999); Total knee arthroplasty (Right, 03/19/2021); fracture surgery

## 2024-06-10 NOTE — ED NOTES
Pt tachycardic with shallow labored breathing noted upon EMS arrival. Pt receiving 2nd breathing treatment upon arrival. SpO2 87% for EMS with treatments, pt 88% on arrival to ED.     Pt speaking in one word sentences with accessory muscle use noted.     RT cartside. Pt placed on bipap at 12/6 with FiO2 50%. Pt continues to have labored breathing.

## 2024-06-10 NOTE — H&P
CRITICAL CARE PROGRESS NOTE      Patient:  Toshia South    Unit/Bed:4B-06/006-A  YOB: 1945  MRN: [id number]   PCP: Jong Moraes MD  Date of Admission: 6/10/2024  Chief Complaint:- SOB - intubated at OSH    Assessment and Plan:    Acute hypoxic/hypercapnic respiratory failure: 2/2 COVID-pneumonia.  Intubated at outside facility for increasing respiratory distress.  Continue current ventilator settings, ensure pressure support and not volume support.  Lung protective strategies. Repeat CXR and ABG in the morning.  Septic shock 2/2 COVID-pneumonia: Patient developed hypotension requiring vasopressors.  Lactic acid elevated.  Levophed requirements have been improving today.  Acute Lung Injury 2/2 COVID-19 pneumonia:  +ve COVID-19 test in setting of bilateral infiltrates, acute onset shortness of breath, and temp 100.4.  CRP/D-dimer elevated.  Is fully vaccinated per .  PF ratio >300.  Sofa score 9.  Procalcitonin 23.07  Started on daily Decadron and baricitinib  MRSA PCR positive, however have already obtained pneumonia panel PCR and respiratory cultures, will hold on antibiotics until those result.  Will repeat CXR in the morning  Lactic acidosis: Likely 2/2 acute infection.  Trended lactic acid 2.8, 3.9, 3.5.  Will increase fluids.  Patient does not have ARDS at this time, BNP WNL, can tolerate some fluids.  Continue to trend lactic acid.  Leukopenia: Likely 2/2 acute infection.  Patient not on any immunosuppressants.  No history of leukopenia on review of prior lab work.  Hx of COPD and Pulm Fibrosis: On 2 L NC at baseline.  No PFTs in our records.  Patient on Ellipta outpatient.  Paroxysmal atrial fibrillation: [id number] 4.  On Xarelto outpatient.  Continue Xarelto at this time.  HFpEF, not in exacerbation: EF 65 to 70% on 2/23/2024.  Grade 1 diastolic dysfunction.  Patient on diuretics at home.  Oral diuretics in setting of hypotension requiring pressors at this time.  BNP

## 2024-06-11 ENCOUNTER — APPOINTMENT (OUTPATIENT)
Dept: GENERAL RADIOLOGY | Age: 79
DRG: 871 | End: 2024-06-11
Attending: INTERNAL MEDICINE
Payer: MEDICARE

## 2024-06-11 LAB
ANION GAP SERPL CALC-SCNC: 8 MEQ/L (ref 8–16)
ARTERIAL PATENCY WRIST A: POSITIVE
BASE EXCESS BLDA CALC-SCNC: -0.7 MMOL/L (ref -2.5–2.5)
BASOPHILS ABSOLUTE: 0 THOU/MM3 (ref 0–0.1)
BASOPHILS NFR BLD AUTO: 0.6 %
BDY SITE: ABNORMAL
BREATHS SETTING VENT: 16 BPM
BUN SERPL-MCNC: 16 MG/DL (ref 7–22)
CALCIUM SERPL-MCNC: 8.4 MG/DL (ref 8.5–10.5)
CHLORIDE SERPL-SCNC: 111 MEQ/L (ref 98–111)
CO2 SERPL-SCNC: 23 MEQ/L (ref 23–33)
COLLECTED BY:: ABNORMAL
CREAT SERPL-MCNC: 0.4 MG/DL (ref 0.4–1.2)
DEPRECATED RDW RBC AUTO: 51.5 FL (ref 35–45)
DEVICE: ABNORMAL
EKG ATRIAL RATE: 145 BPM
EKG P AXIS: 62 DEGREES
EKG P-R INTERVAL: 158 MS
EKG Q-T INTERVAL: 246 MS
EKG QRS DURATION: 80 MS
EKG QTC CALCULATION (BAZETT): 382 MS
EKG R AXIS: -37 DEGREES
EKG T AXIS: 102 DEGREES
EKG VENTRICULAR RATE: 145 BPM
EOSINOPHIL NFR BLD AUTO: 0.4 %
EOSINOPHILS ABSOLUTE: 0 THOU/MM3 (ref 0–0.4)
ERYTHROCYTE [DISTWIDTH] IN BLOOD BY AUTOMATED COUNT: 14.3 % (ref 11.5–14.5)
FIO2 ON VENT O2 ANALYZER: 50 %
GFR SERPL CREATININE-BSD FRML MDRD: > 90 ML/MIN/1.73M2
GLUCOSE SERPL-MCNC: 115 MG/DL (ref 70–108)
HCO3 BLDA-SCNC: 26 MMOL/L (ref 23–28)
HCT VFR BLD AUTO: 39.1 % (ref 37–47)
HGB BLD-MCNC: 12.2 GM/DL (ref 12–16)
IMM GRANULOCYTES # BLD AUTO: 0.07 THOU/MM3 (ref 0–0.07)
IMM GRANULOCYTES NFR BLD AUTO: 1 %
LACTATE SERPL-SCNC: 1.5 MMOL/L (ref 0.5–2)
LACTATE SERPL-SCNC: 2.1 MMOL/L (ref 0.5–2)
LACTATE SERPL-SCNC: 2.7 MMOL/L (ref 0.5–2)
LYMPHOCYTES ABSOLUTE: 0.5 THOU/MM3 (ref 1–4.8)
LYMPHOCYTES NFR BLD AUTO: 6.4 %
MAGNESIUM SERPL-MCNC: 2 MG/DL (ref 1.6–2.4)
MCH RBC QN AUTO: 30.6 PG (ref 26–33)
MCHC RBC AUTO-ENTMCNC: 31.2 GM/DL (ref 32.2–35.5)
MCV RBC AUTO: 98 FL (ref 81–99)
MONOCYTES ABSOLUTE: 0.6 THOU/MM3 (ref 0.4–1.3)
MONOCYTES NFR BLD AUTO: 7.9 %
NEUTROPHILS ABSOLUTE: 6 THOU/MM3 (ref 1.8–7.7)
NEUTROPHILS NFR BLD AUTO: 83.7 %
NRBC BLD AUTO-RTO: 0 /100 WBC
PCO2 BLDA: 52 MMHG (ref 35–45)
PEEP SETTING VENT: 8 MMHG
PH BLDA: 7.32 [PH] (ref 7.35–7.45)
PIP: 20 CMH2O
PLATELET # BLD AUTO: 185 THOU/MM3 (ref 130–400)
PLATELET BLD QL SMEAR: ADEQUATE
PMV BLD AUTO: 9.8 FL (ref 9.4–12.4)
PO2 BLDA: 112 MMHG (ref 71–104)
POTASSIUM SERPL-SCNC: 4.3 MEQ/L (ref 3.5–5.2)
RBC # BLD AUTO: 3.99 MILL/MM3 (ref 4.2–5.4)
SAO2 % BLDA: 98 %
SCAN OF BLOOD SMEAR: NORMAL
SODIUM SERPL-SCNC: 142 MEQ/L (ref 135–145)
VENTILATION MODE VENT: ABNORMAL
WBC # BLD AUTO: 7.2 THOU/MM3 (ref 4.8–10.8)

## 2024-06-11 PROCEDURE — 6370000000 HC RX 637 (ALT 250 FOR IP)

## 2024-06-11 PROCEDURE — 2700000000 HC OXYGEN THERAPY PER DAY

## 2024-06-11 PROCEDURE — 85025 COMPLETE CBC W/AUTO DIFF WBC: CPT

## 2024-06-11 PROCEDURE — 89220 SPUTUM SPECIMEN COLLECTION: CPT

## 2024-06-11 PROCEDURE — 94003 VENT MGMT INPAT SUBQ DAY: CPT

## 2024-06-11 PROCEDURE — 2100000000 HC CCU R&B

## 2024-06-11 PROCEDURE — 6370000000 HC RX 637 (ALT 250 FOR IP): Performed by: INTERNAL MEDICINE

## 2024-06-11 PROCEDURE — 93010 ELECTROCARDIOGRAM REPORT: CPT | Performed by: FAMILY MEDICINE

## 2024-06-11 PROCEDURE — C9113 INJ PANTOPRAZOLE SODIUM, VIA: HCPCS | Performed by: INTERNAL MEDICINE

## 2024-06-11 PROCEDURE — 2580000003 HC RX 258

## 2024-06-11 PROCEDURE — 6360000002 HC RX W HCPCS

## 2024-06-11 PROCEDURE — 6360000002 HC RX W HCPCS: Performed by: INTERNAL MEDICINE

## 2024-06-11 PROCEDURE — 82803 BLOOD GASES ANY COMBINATION: CPT

## 2024-06-11 PROCEDURE — 99291 CRITICAL CARE FIRST HOUR: CPT | Performed by: INTERNAL MEDICINE

## 2024-06-11 PROCEDURE — 83735 ASSAY OF MAGNESIUM: CPT

## 2024-06-11 PROCEDURE — 80048 BASIC METABOLIC PNL TOTAL CA: CPT

## 2024-06-11 PROCEDURE — 94640 AIRWAY INHALATION TREATMENT: CPT

## 2024-06-11 PROCEDURE — 71045 X-RAY EXAM CHEST 1 VIEW: CPT

## 2024-06-11 PROCEDURE — 94761 N-INVAS EAR/PLS OXIMETRY MLT: CPT

## 2024-06-11 PROCEDURE — 83605 ASSAY OF LACTIC ACID: CPT

## 2024-06-11 PROCEDURE — 36415 COLL VENOUS BLD VENIPUNCTURE: CPT

## 2024-06-11 PROCEDURE — C9113 INJ PANTOPRAZOLE SODIUM, VIA: HCPCS

## 2024-06-11 RX ORDER — DEXAMETHASONE SODIUM PHOSPHATE 10 MG/ML
10 INJECTION, EMULSION INTRAMUSCULAR; INTRAVENOUS DAILY
Status: DISCONTINUED | OUTPATIENT
Start: 2024-06-16 | End: 2024-06-16

## 2024-06-11 RX ORDER — PANTOPRAZOLE SODIUM 40 MG/10ML
40 INJECTION, POWDER, LYOPHILIZED, FOR SOLUTION INTRAVENOUS 2 TIMES DAILY
Status: DISCONTINUED | OUTPATIENT
Start: 2024-06-11 | End: 2024-06-15

## 2024-06-11 RX ADMIN — CHLORHEXIDINE GLUCONATE 0.12% ORAL RINSE 15 ML: 1.2 LIQUID ORAL at 08:57

## 2024-06-11 RX ADMIN — BARICITINIB 4 MG: 2 TABLET, FILM COATED ORAL at 08:57

## 2024-06-11 RX ADMIN — PROPOFOL 35 MCG/KG/MIN: 10 INJECTION, EMULSION INTRAVENOUS at 02:14

## 2024-06-11 RX ADMIN — RIVAROXABAN 20 MG: 20 TABLET, FILM COATED ORAL at 17:22

## 2024-06-11 RX ADMIN — PROPOFOL 40 MCG/KG/MIN: 10 INJECTION, EMULSION INTRAVENOUS at 14:53

## 2024-06-11 RX ADMIN — PIPERACILLIN SODIUM AND TAZOBACTAM SODIUM 3375 MG: 3; .375 INJECTION, POWDER, LYOPHILIZED, FOR SOLUTION INTRAVENOUS at 23:00

## 2024-06-11 RX ADMIN — SODIUM CHLORIDE: 9 INJECTION, SOLUTION INTRAVENOUS at 10:18

## 2024-06-11 RX ADMIN — PANTOPRAZOLE SODIUM 40 MG: 40 INJECTION, POWDER, FOR SOLUTION INTRAVENOUS at 19:26

## 2024-06-11 RX ADMIN — ASPIRIN 81 MG: 81 TABLET, COATED ORAL at 08:57

## 2024-06-11 RX ADMIN — PROPOFOL 40 MCG/KG/MIN: 10 INJECTION, EMULSION INTRAVENOUS at 07:46

## 2024-06-11 RX ADMIN — PANTOPRAZOLE SODIUM 40 MG: 40 INJECTION, POWDER, FOR SOLUTION INTRAVENOUS at 08:56

## 2024-06-11 RX ADMIN — PIPERACILLIN AND TAZOBACTAM 4500 MG: 4; .5 INJECTION, POWDER, LYOPHILIZED, FOR SOLUTION INTRAVENOUS at 15:56

## 2024-06-11 RX ADMIN — Medication 1750 MG: at 10:23

## 2024-06-11 RX ADMIN — DEXAMETHASONE SODIUM PHOSPHATE 6 MG: 4 INJECTION, SOLUTION INTRA-ARTICULAR; INTRALESIONAL; INTRAMUSCULAR; INTRAVENOUS; SOFT TISSUE at 08:57

## 2024-06-11 RX ADMIN — TIOTROPIUM BROMIDE AND OLODATEROL 2 PUFF: 3.124; 2.736 SPRAY, METERED RESPIRATORY (INHALATION) at 07:46

## 2024-06-11 RX ADMIN — LEVOTHYROXINE SODIUM 200 MCG: 0.1 TABLET ORAL at 05:28

## 2024-06-11 RX ADMIN — PROPOFOL 40 MCG/KG/MIN: 10 INJECTION, EMULSION INTRAVENOUS at 21:38

## 2024-06-11 RX ADMIN — CHLORHEXIDINE GLUCONATE 0.12% ORAL RINSE 15 ML: 1.2 LIQUID ORAL at 19:26

## 2024-06-11 RX ADMIN — SODIUM CHLORIDE, PRESERVATIVE FREE 10 ML: 5 INJECTION INTRAVENOUS at 08:57

## 2024-06-11 RX ADMIN — DEXAMETHASONE SODIUM PHOSPHATE 20 MG: 4 INJECTION, SOLUTION INTRAMUSCULAR; INTRAVENOUS at 15:55

## 2024-06-11 RX ADMIN — SODIUM CHLORIDE, PRESERVATIVE FREE 10 ML: 5 INJECTION INTRAVENOUS at 19:25

## 2024-06-11 RX ADMIN — SODIUM CHLORIDE: 9 INJECTION, SOLUTION INTRAVENOUS at 04:08

## 2024-06-11 ASSESSMENT — PAIN SCALES - GENERAL
PAINLEVEL_OUTOF10: 0

## 2024-06-11 ASSESSMENT — PULMONARY FUNCTION TESTS
PIF_VALUE: 16
PIF_VALUE: 13
PIF_VALUE: 19
PIF_VALUE: 22
PIF_VALUE: 14
PIF_VALUE: 22

## 2024-06-11 NOTE — CARE COORDINATION
Case Management Assessment Initial Evaluation    Date/Time of Evaluation: 2024 8:49 AM  Assessment Completed by: Shima Colvin RN    If patient is discharged prior to next notation, then this note serves as note for discharge by case management.    Patient Name: Toshia South                   YOB: 1945  Diagnosis: COVID [U07.1]  Acute respiratory failure with hypoxia (HCC) [J96.01]  Hypoxic respiratory failure (HCC) [J96.91]                   Date / Time: 6/10/2024 11:40 AM  Location: [address] [place] [address]     Patient Admission Status: Inpatient   Readmission Risk Low 0-14, Mod 15-19), High > 20: Readmission Risk Score: 20.2    Current PCP: Jong Moraes MD    Additional Case Management Notes: Presented to ED with acute onset of SOB. Pt had tried breathing treatment - was no help. In ED, sats in 70's on room air and was placed on bipap. Eventually reuired intubation in ED. Admitted to ICU. Acute Lung injury. +COVID 19. Levo weaned off this morning.     Remains on vent w/ETT on SBT, FIO2 40%, sats 96%. Tmax 99.4. NSR. Unable to follow commands; KURTZ x4 to painful stim. +COVID 19. Respiratory culture +Cnterobacter Clocae Complex, EColi, Klebsiella aerogenes, Klebsiella pneumoniae, strep agalactaiae, & MRSA  by PCR. +MRSA nares. Telemetry, OG to artemio FINNEY, SCDs. Diprivan @ 40 mcg/kg/min, aa, baricitinib, peridex, IV decadron 6 mg daily, IV protonix, xarelto, IV vancomycin, Electrolyte replacement protocols. Blood and urine cultures sent.     Procedures:   6/10 Inutbated    Imagin/10 CXR: Development of fairly symmetric bilateral multifocal infiltrates consistent  with pulmonary edema or multifocal pneumonia.   CXR: Diffuse consolidating opacities in the right lung and mild left lung   opacities. The findings are suspicious for pneumonia. The differential   diagnosis would include pulmonary edema.  Nasogastric tube, well-positioned.     Endotracheal tube tip is 2.3 cm above the

## 2024-06-12 ENCOUNTER — APPOINTMENT (OUTPATIENT)
Dept: GENERAL RADIOLOGY | Age: 79
DRG: 871 | End: 2024-06-12
Attending: INTERNAL MEDICINE
Payer: MEDICARE

## 2024-06-12 LAB
ANION GAP SERPL CALC-SCNC: 6 MEQ/L (ref 8–16)
BACTERIA UR CULT: NORMAL
BASOPHILS ABSOLUTE: 0 THOU/MM3 (ref 0–0.1)
BASOPHILS NFR BLD AUTO: 0.5 %
BUN SERPL-MCNC: 17 MG/DL (ref 7–22)
CALCIUM SERPL-MCNC: 8.9 MG/DL (ref 8.5–10.5)
CHLORIDE SERPL-SCNC: 107 MEQ/L (ref 98–111)
CO2 SERPL-SCNC: 25 MEQ/L (ref 23–33)
CREAT SERPL-MCNC: 0.4 MG/DL (ref 0.4–1.2)
DEPRECATED RDW RBC AUTO: 52.1 FL (ref 35–45)
EKG Q-T INTERVAL: 334 MS
EKG QRS DURATION: 88 MS
EKG QTC CALCULATION (BAZETT): 464 MS
EKG R AXIS: -19 DEGREES
EKG T AXIS: 56 DEGREES
EKG VENTRICULAR RATE: 116 BPM
EOSINOPHIL NFR BLD AUTO: 0.1 %
EOSINOPHILS ABSOLUTE: 0 THOU/MM3 (ref 0–0.4)
ERYTHROCYTE [DISTWIDTH] IN BLOOD BY AUTOMATED COUNT: 14.5 % (ref 11.5–14.5)
GFR SERPL CREATININE-BSD FRML MDRD: > 90 ML/MIN/1.73M2
GLUCOSE SERPL-MCNC: 86 MG/DL (ref 70–108)
HCT VFR BLD AUTO: 38.6 % (ref 37–47)
HGB BLD-MCNC: 12.3 GM/DL (ref 12–16)
IMM GRANULOCYTES # BLD AUTO: 0.1 THOU/MM3 (ref 0–0.07)
IMM GRANULOCYTES NFR BLD AUTO: 1.3 %
LYMPHOCYTES ABSOLUTE: 0.7 THOU/MM3 (ref 1–4.8)
LYMPHOCYTES NFR BLD AUTO: 8.6 %
MAGNESIUM SERPL-MCNC: 2.1 MG/DL (ref 1.6–2.4)
MCH RBC QN AUTO: 31.1 PG (ref 26–33)
MCHC RBC AUTO-ENTMCNC: 31.9 GM/DL (ref 32.2–35.5)
MCV RBC AUTO: 97.5 FL (ref 81–99)
MONOCYTES ABSOLUTE: 0.5 THOU/MM3 (ref 0.4–1.3)
MONOCYTES NFR BLD AUTO: 6.5 %
NEUTROPHILS ABSOLUTE: 6.5 THOU/MM3 (ref 1.8–7.7)
NEUTROPHILS NFR BLD AUTO: 83 %
NRBC BLD AUTO-RTO: 0 /100 WBC
PLATELET # BLD AUTO: 170 THOU/MM3 (ref 130–400)
PLATELET BLD QL SMEAR: ADEQUATE
PMV BLD AUTO: 10 FL (ref 9.4–12.4)
POTASSIUM SERPL-SCNC: 3.9 MEQ/L (ref 3.5–5.2)
RBC # BLD AUTO: 3.96 MILL/MM3 (ref 4.2–5.4)
SCAN OF BLOOD SMEAR: NORMAL
SODIUM SERPL-SCNC: 138 MEQ/L (ref 135–145)
WBC # BLD AUTO: 7.8 THOU/MM3 (ref 4.8–10.8)

## 2024-06-12 PROCEDURE — 6370000000 HC RX 637 (ALT 250 FOR IP)

## 2024-06-12 PROCEDURE — 99291 CRITICAL CARE FIRST HOUR: CPT | Performed by: INTERNAL MEDICINE

## 2024-06-12 PROCEDURE — 2100000000 HC CCU R&B

## 2024-06-12 PROCEDURE — 85025 COMPLETE CBC W/AUTO DIFF WBC: CPT

## 2024-06-12 PROCEDURE — 97530 THERAPEUTIC ACTIVITIES: CPT

## 2024-06-12 PROCEDURE — 97162 PT EVAL MOD COMPLEX 30 MIN: CPT

## 2024-06-12 PROCEDURE — 2580000003 HC RX 258

## 2024-06-12 PROCEDURE — 2500000003 HC RX 250 WO HCPCS

## 2024-06-12 PROCEDURE — 94003 VENT MGMT INPAT SUBQ DAY: CPT

## 2024-06-12 PROCEDURE — 6360000002 HC RX W HCPCS

## 2024-06-12 PROCEDURE — 83735 ASSAY OF MAGNESIUM: CPT

## 2024-06-12 PROCEDURE — 93005 ELECTROCARDIOGRAM TRACING: CPT | Performed by: INTERNAL MEDICINE

## 2024-06-12 PROCEDURE — 71045 X-RAY EXAM CHEST 1 VIEW: CPT

## 2024-06-12 PROCEDURE — 6360000002 HC RX W HCPCS: Performed by: INTERNAL MEDICINE

## 2024-06-12 PROCEDURE — 36415 COLL VENOUS BLD VENIPUNCTURE: CPT

## 2024-06-12 PROCEDURE — 94761 N-INVAS EAR/PLS OXIMETRY MLT: CPT

## 2024-06-12 PROCEDURE — 93010 ELECTROCARDIOGRAM REPORT: CPT | Performed by: INTERNAL MEDICINE

## 2024-06-12 PROCEDURE — C9113 INJ PANTOPRAZOLE SODIUM, VIA: HCPCS | Performed by: INTERNAL MEDICINE

## 2024-06-12 PROCEDURE — 80048 BASIC METABOLIC PNL TOTAL CA: CPT

## 2024-06-12 PROCEDURE — 6370000000 HC RX 637 (ALT 250 FOR IP): Performed by: INTERNAL MEDICINE

## 2024-06-12 PROCEDURE — 2700000000 HC OXYGEN THERAPY PER DAY

## 2024-06-12 PROCEDURE — 94640 AIRWAY INHALATION TREATMENT: CPT

## 2024-06-12 RX ORDER — DULOXETIN HYDROCHLORIDE 60 MG/1
60 CAPSULE, DELAYED RELEASE ORAL DAILY
Status: DISCONTINUED | OUTPATIENT
Start: 2024-06-13 | End: 2024-06-17 | Stop reason: HOSPADM

## 2024-06-12 RX ORDER — TRAZODONE HYDROCHLORIDE 100 MG/1
100 TABLET ORAL NIGHTLY
Status: DISCONTINUED | OUTPATIENT
Start: 2024-06-12 | End: 2024-06-14

## 2024-06-12 RX ORDER — METOPROLOL TARTRATE 1 MG/ML
5 INJECTION, SOLUTION INTRAVENOUS EVERY 6 HOURS PRN
Status: DISCONTINUED | OUTPATIENT
Start: 2024-06-12 | End: 2024-06-17 | Stop reason: HOSPADM

## 2024-06-12 RX ORDER — HYDROXYZINE HYDROCHLORIDE 10 MG/1
10 TABLET, FILM COATED ORAL 3 TIMES DAILY PRN
Status: DISCONTINUED | OUTPATIENT
Start: 2024-06-12 | End: 2024-06-17 | Stop reason: HOSPADM

## 2024-06-12 RX ORDER — HYDRALAZINE HYDROCHLORIDE 20 MG/ML
5 INJECTION INTRAMUSCULAR; INTRAVENOUS EVERY 4 HOURS PRN
Status: DISCONTINUED | OUTPATIENT
Start: 2024-06-12 | End: 2024-06-17 | Stop reason: HOSPADM

## 2024-06-12 RX ADMIN — DEXAMETHASONE SODIUM PHOSPHATE 20 MG: 4 INJECTION, SOLUTION INTRAMUSCULAR; INTRAVENOUS at 09:25

## 2024-06-12 RX ADMIN — BARICITINIB 4 MG: 2 TABLET, FILM COATED ORAL at 08:25

## 2024-06-12 RX ADMIN — CHLORHEXIDINE GLUCONATE 0.12% ORAL RINSE 15 ML: 1.2 LIQUID ORAL at 20:00

## 2024-06-12 RX ADMIN — PIPERACILLIN AND TAZOBACTAM 3375 MG: 3; .375 INJECTION, POWDER, FOR SOLUTION INTRAVENOUS at 21:45

## 2024-06-12 RX ADMIN — SODIUM CHLORIDE, PRESERVATIVE FREE 10 ML: 5 INJECTION INTRAVENOUS at 08:26

## 2024-06-12 RX ADMIN — PANTOPRAZOLE SODIUM 40 MG: 40 INJECTION, POWDER, FOR SOLUTION INTRAVENOUS at 08:25

## 2024-06-12 RX ADMIN — SERTRALINE HYDROCHLORIDE 50 MG: 50 TABLET ORAL at 17:07

## 2024-06-12 RX ADMIN — PANTOPRAZOLE SODIUM 40 MG: 40 INJECTION, POWDER, FOR SOLUTION INTRAVENOUS at 20:01

## 2024-06-12 RX ADMIN — Medication 1250 MG: at 03:57

## 2024-06-12 RX ADMIN — CHLORHEXIDINE GLUCONATE 0.12% ORAL RINSE 15 ML: 1.2 LIQUID ORAL at 08:25

## 2024-06-12 RX ADMIN — PROPOFOL 30 MCG/KG/MIN: 10 INJECTION, EMULSION INTRAVENOUS at 09:23

## 2024-06-12 RX ADMIN — ASPIRIN 81 MG: 81 TABLET, COATED ORAL at 08:25

## 2024-06-12 RX ADMIN — SODIUM CHLORIDE, PRESERVATIVE FREE 10 ML: 5 INJECTION INTRAVENOUS at 20:01

## 2024-06-12 RX ADMIN — METOPROLOL TARTRATE 5 MG: 5 INJECTION INTRAVENOUS at 15:42

## 2024-06-12 RX ADMIN — TIOTROPIUM BROMIDE AND OLODATEROL 2 PUFF: 3.124; 2.736 SPRAY, METERED RESPIRATORY (INHALATION) at 07:45

## 2024-06-12 RX ADMIN — ALBUTEROL SULFATE 2.5 MG: 2.5 SOLUTION RESPIRATORY (INHALATION) at 13:25

## 2024-06-12 RX ADMIN — PIPERACILLIN SODIUM AND TAZOBACTAM SODIUM 3375 MG: 3; .375 INJECTION, POWDER, LYOPHILIZED, FOR SOLUTION INTRAVENOUS at 06:00

## 2024-06-12 RX ADMIN — PIPERACILLIN AND TAZOBACTAM 3375 MG: 3; .375 INJECTION, POWDER, FOR SOLUTION INTRAVENOUS at 14:29

## 2024-06-12 RX ADMIN — LEVOTHYROXINE SODIUM 200 MCG: 0.1 TABLET ORAL at 05:54

## 2024-06-12 RX ADMIN — PROPOFOL 45 MCG/KG/MIN: 10 INJECTION, EMULSION INTRAVENOUS at 04:09

## 2024-06-12 RX ADMIN — RIVAROXABAN 20 MG: 20 TABLET, FILM COATED ORAL at 17:07

## 2024-06-12 RX ADMIN — Medication 1250 MG: at 21:46

## 2024-06-12 RX ADMIN — TRAZODONE HYDROCHLORIDE 100 MG: 100 TABLET ORAL at 22:00

## 2024-06-12 ASSESSMENT — PAIN SCALES - GENERAL
PAINLEVEL_OUTOF10: 0

## 2024-06-12 ASSESSMENT — PULMONARY FUNCTION TESTS
PIF_VALUE: 16
PIF_VALUE: 18
PIF_VALUE: 17

## 2024-06-12 NOTE — SIGNIFICANT EVENT
Patient placed on 10/6 with FiO2 40% this morning.  Tolerated well.  Breathing spontaneously.  Positive cuff leak.  Following all commands, sedation has been weaned.  Patient was successfully extubated with respiratory therapy, nursing staff, and myself at bedside.  No acute complications.  Patient placed on 4 L NC, good oxygen saturation.  Continue to monitor.    Electronically Signed by  Brad Foley DO, MBA  PGY-1 Internal Medicine Resident  [facility]  On 6/12/2024 at 11:56 AM    This report has been created using voice recognition software. It may contain minor errors which are inherent in voice recognition technology

## 2024-06-12 NOTE — PROCEDURES
PROCEDURE NOTE  Date: 6/12/2024   Name: Toshia South  YOB: 1945    Procedures  12 lead EKG completed. Results handed to Osiel Leiva CET

## 2024-06-13 ENCOUNTER — APPOINTMENT (OUTPATIENT)
Dept: GENERAL RADIOLOGY | Age: 79
DRG: 871 | End: 2024-06-13
Attending: INTERNAL MEDICINE
Payer: MEDICARE

## 2024-06-13 LAB
ANION GAP SERPL CALC-SCNC: 11 MEQ/L (ref 8–16)
BACTERIA SPEC RESP CULT: ABNORMAL
BACTERIA SPEC RESP CULT: ABNORMAL
BASOPHILS ABSOLUTE: 0 THOU/MM3 (ref 0–0.1)
BASOPHILS NFR BLD AUTO: 0.2 %
BUN SERPL-MCNC: 21 MG/DL (ref 7–22)
CALCIUM SERPL-MCNC: 8.9 MG/DL (ref 8.5–10.5)
CHLORIDE SERPL-SCNC: 103 MEQ/L (ref 98–111)
CO2 SERPL-SCNC: 25 MEQ/L (ref 23–33)
CREAT SERPL-MCNC: 0.3 MG/DL (ref 0.4–1.2)
DEPRECATED RDW RBC AUTO: 51.8 FL (ref 35–45)
EOSINOPHIL NFR BLD AUTO: 0 %
EOSINOPHILS ABSOLUTE: 0 THOU/MM3 (ref 0–0.4)
ERYTHROCYTE [DISTWIDTH] IN BLOOD BY AUTOMATED COUNT: 14.3 % (ref 11.5–14.5)
GFR SERPL CREATININE-BSD FRML MDRD: > 90 ML/MIN/1.73M2
GLUCOSE SERPL-MCNC: 99 MG/DL (ref 70–108)
GRAM STN SPEC: ABNORMAL
HCT VFR BLD AUTO: 41.7 % (ref 37–47)
HGB BLD-MCNC: 13.3 GM/DL (ref 12–16)
IMM GRANULOCYTES # BLD AUTO: 0.02 THOU/MM3 (ref 0–0.07)
IMM GRANULOCYTES NFR BLD AUTO: 0.2 %
LYMPHOCYTES ABSOLUTE: 0.5 THOU/MM3 (ref 1–4.8)
LYMPHOCYTES NFR BLD AUTO: 5.9 %
MAGNESIUM SERPL-MCNC: 2 MG/DL (ref 1.6–2.4)
MCH RBC QN AUTO: 31.7 PG (ref 26–33)
MCHC RBC AUTO-ENTMCNC: 31.9 GM/DL (ref 32.2–35.5)
MCV RBC AUTO: 99.3 FL (ref 81–99)
MONOCYTES ABSOLUTE: 0.3 THOU/MM3 (ref 0.4–1.3)
MONOCYTES NFR BLD AUTO: 3.3 %
NEUTROPHILS ABSOLUTE: 8.1 THOU/MM3 (ref 1.8–7.7)
NEUTROPHILS NFR BLD AUTO: 90.4 %
NRBC BLD AUTO-RTO: 0 /100 WBC
ORGANISM: ABNORMAL
PLATELET # BLD AUTO: 195 THOU/MM3 (ref 130–400)
PMV BLD AUTO: 10.1 FL (ref 9.4–12.4)
POTASSIUM SERPL-SCNC: 3.3 MEQ/L (ref 3.5–5.2)
POTASSIUM SERPL-SCNC: 3.6 MEQ/L (ref 3.5–5.2)
RBC # BLD AUTO: 4.2 MILL/MM3 (ref 4.2–5.4)
SODIUM SERPL-SCNC: 139 MEQ/L (ref 135–145)
TRIGL SERPL-MCNC: 126 MG/DL (ref 0–199)
VANCOMYCIN SERPL-MCNC: 8.5 UG/ML (ref 0.1–39.9)
WBC # BLD AUTO: 9 THOU/MM3 (ref 4.8–10.8)

## 2024-06-13 PROCEDURE — 80202 ASSAY OF VANCOMYCIN: CPT

## 2024-06-13 PROCEDURE — 6370000000 HC RX 637 (ALT 250 FOR IP)

## 2024-06-13 PROCEDURE — 80048 BASIC METABOLIC PNL TOTAL CA: CPT

## 2024-06-13 PROCEDURE — 74230 X-RAY XM SWLNG FUNCJ C+: CPT

## 2024-06-13 PROCEDURE — 97166 OT EVAL MOD COMPLEX 45 MIN: CPT

## 2024-06-13 PROCEDURE — 83735 ASSAY OF MAGNESIUM: CPT

## 2024-06-13 PROCEDURE — C9113 INJ PANTOPRAZOLE SODIUM, VIA: HCPCS | Performed by: INTERNAL MEDICINE

## 2024-06-13 PROCEDURE — 6370000000 HC RX 637 (ALT 250 FOR IP): Performed by: INTERNAL MEDICINE

## 2024-06-13 PROCEDURE — 51798 US URINE CAPACITY MEASURE: CPT

## 2024-06-13 PROCEDURE — 6360000002 HC RX W HCPCS: Performed by: INTERNAL MEDICINE

## 2024-06-13 PROCEDURE — 92610 EVALUATE SWALLOWING FUNCTION: CPT

## 2024-06-13 PROCEDURE — 85025 COMPLETE CBC W/AUTO DIFF WBC: CPT

## 2024-06-13 PROCEDURE — 2060000000 HC ICU INTERMEDIATE R&B

## 2024-06-13 PROCEDURE — 99233 SBSQ HOSP IP/OBS HIGH 50: CPT | Performed by: INTERNAL MEDICINE

## 2024-06-13 PROCEDURE — 92526 ORAL FUNCTION THERAPY: CPT

## 2024-06-13 PROCEDURE — 84132 ASSAY OF SERUM POTASSIUM: CPT

## 2024-06-13 PROCEDURE — 84478 ASSAY OF TRIGLYCERIDES: CPT

## 2024-06-13 PROCEDURE — 94669 MECHANICAL CHEST WALL OSCILL: CPT

## 2024-06-13 PROCEDURE — 2580000003 HC RX 258

## 2024-06-13 PROCEDURE — 97530 THERAPEUTIC ACTIVITIES: CPT

## 2024-06-13 PROCEDURE — 2500000003 HC RX 250 WO HCPCS: Performed by: INTERNAL MEDICINE

## 2024-06-13 PROCEDURE — 97110 THERAPEUTIC EXERCISES: CPT

## 2024-06-13 PROCEDURE — 36415 COLL VENOUS BLD VENIPUNCTURE: CPT

## 2024-06-13 PROCEDURE — 92611 MOTION FLUOROSCOPY/SWALLOW: CPT

## 2024-06-13 PROCEDURE — 94640 AIRWAY INHALATION TREATMENT: CPT

## 2024-06-13 PROCEDURE — 71045 X-RAY EXAM CHEST 1 VIEW: CPT

## 2024-06-13 PROCEDURE — 6360000002 HC RX W HCPCS

## 2024-06-13 RX ORDER — ALBUTEROL SULFATE 90 UG/1
2 AEROSOL, METERED RESPIRATORY (INHALATION)
Status: DISCONTINUED | OUTPATIENT
Start: 2024-06-13 | End: 2024-06-17 | Stop reason: HOSPADM

## 2024-06-13 RX ADMIN — RIVAROXABAN 20 MG: 20 TABLET, FILM COATED ORAL at 17:56

## 2024-06-13 RX ADMIN — DEXAMETHASONE SODIUM PHOSPHATE 20 MG: 4 INJECTION, SOLUTION INTRAMUSCULAR; INTRAVENOUS at 11:15

## 2024-06-13 RX ADMIN — VANCOMYCIN HYDROCHLORIDE 1250 MG: 5 INJECTION, POWDER, LYOPHILIZED, FOR SOLUTION INTRAVENOUS at 23:20

## 2024-06-13 RX ADMIN — PIPERACILLIN AND TAZOBACTAM 3375 MG: 3; .375 INJECTION, POWDER, FOR SOLUTION INTRAVENOUS at 15:14

## 2024-06-13 RX ADMIN — TRAZODONE HYDROCHLORIDE 100 MG: 100 TABLET ORAL at 20:57

## 2024-06-13 RX ADMIN — POTASSIUM CHLORIDE 10 MEQ: 7.46 INJECTION, SOLUTION INTRAVENOUS at 04:51

## 2024-06-13 RX ADMIN — PANTOPRAZOLE SODIUM 40 MG: 40 INJECTION, POWDER, FOR SOLUTION INTRAVENOUS at 20:57

## 2024-06-13 RX ADMIN — POTASSIUM CHLORIDE 10 MEQ: 7.46 INJECTION, SOLUTION INTRAVENOUS at 05:50

## 2024-06-13 RX ADMIN — DULOXETINE HYDROCHLORIDE 60 MG: 60 CAPSULE, DELAYED RELEASE ORAL at 08:12

## 2024-06-13 RX ADMIN — VANCOMYCIN HYDROCHLORIDE 1250 MG: 5 INJECTION, POWDER, LYOPHILIZED, FOR SOLUTION INTRAVENOUS at 13:29

## 2024-06-13 RX ADMIN — METOPROLOL TARTRATE 25 MG: 25 TABLET, FILM COATED ORAL at 20:58

## 2024-06-13 RX ADMIN — PIPERACILLIN AND TAZOBACTAM 3375 MG: 3; .375 INJECTION, POWDER, FOR SOLUTION INTRAVENOUS at 05:36

## 2024-06-13 RX ADMIN — TIOTROPIUM BROMIDE AND OLODATEROL 2 PUFF: 3.124; 2.736 SPRAY, METERED RESPIRATORY (INHALATION) at 09:12

## 2024-06-13 RX ADMIN — PIPERACILLIN AND TAZOBACTAM 3375 MG: 3; .375 INJECTION, POWDER, FOR SOLUTION INTRAVENOUS at 23:25

## 2024-06-13 RX ADMIN — BARICITINIB 4 MG: 2 TABLET, FILM COATED ORAL at 08:12

## 2024-06-13 RX ADMIN — PANTOPRAZOLE SODIUM 40 MG: 40 INJECTION, POWDER, FOR SOLUTION INTRAVENOUS at 08:12

## 2024-06-13 RX ADMIN — ASPIRIN 81 MG: 81 TABLET, COATED ORAL at 08:12

## 2024-06-13 RX ADMIN — BARIUM SULFATE 70 ML: 0.81 POWDER, FOR SUSPENSION ORAL at 10:33

## 2024-06-13 RX ADMIN — ALBUTEROL SULFATE 2 PUFF: 90 AEROSOL, METERED RESPIRATORY (INHALATION) at 16:09

## 2024-06-13 RX ADMIN — POTASSIUM CHLORIDE 10 MEQ: 7.46 INJECTION, SOLUTION INTRAVENOUS at 06:54

## 2024-06-13 RX ADMIN — SERTRALINE HYDROCHLORIDE 50 MG: 50 TABLET ORAL at 08:12

## 2024-06-13 RX ADMIN — BARIUM SULFATE 20 ML: 400 PASTE ORAL at 10:33

## 2024-06-13 RX ADMIN — METOPROLOL TARTRATE 25 MG: 25 TABLET, FILM COATED ORAL at 11:15

## 2024-06-13 RX ADMIN — SODIUM CHLORIDE, PRESERVATIVE FREE 10 ML: 5 INJECTION INTRAVENOUS at 08:13

## 2024-06-13 RX ADMIN — POTASSIUM CHLORIDE 10 MEQ: 7.46 INJECTION, SOLUTION INTRAVENOUS at 09:27

## 2024-06-13 RX ADMIN — SODIUM CHLORIDE, PRESERVATIVE FREE 10 ML: 5 INJECTION INTRAVENOUS at 20:57

## 2024-06-13 RX ADMIN — BARIUM SULFATE 50 ML: 400 SUSPENSION ORAL at 10:34

## 2024-06-13 ASSESSMENT — PAIN SCALES - GENERAL
PAINLEVEL_OUTOF10: 0

## 2024-06-13 NOTE — RT PROTOCOL NOTE
RT Inhaler-Nebulizer Bronchodilator Protocol Note    There is a bronchodilator order in the chart from a provider indicating to follow the RT Bronchodilator Protocol and there is an “Initiate RT Inhaler-Nebulizer Bronchodilator Protocol” order as well (see protocol at bottom of note).    CXR Findings:  XR CHEST PORTABLE    Result Date: 6/12/2024  Diffuse consolidating opacities in the right lung and mild left lung opacities. The findings are suspicious for pneumonia. The differential diagnosis would include pulmonary edema. There is slight interval improved aeration of both lungs. This document has been electronically signed by: Dylan Atkins MD on 06/12/2024 07:39 AM      The findings from the last RT Protocol Assessment were as follows:   History Pulmonary Disease: Chronic pulmonary disease  Respiratory Pattern: Regular pattern and RR 12-20 bpm  Breath Sounds: Slightly diminished and/or crackles  Cough: Strong, spontaneous, non-productive  Indication for Bronchodilator Therapy: Decreased or absent breath sounds, On home bronchodilators (prn at home)  Bronchodilator Assessment Score: 4    Aerosolized bronchodilator medication orders have been revised according to the RT Inhaler-Nebulizer Bronchodilator Protocol below.    Respiratory Therapist to perform RT Therapy Protocol Assessment initially then follow the protocol.  Repeat RT Therapy Protocol Assessment PRN for score 0-3 or on second treatment, BID, and PRN for scores above 3.    No Indications - adjust the frequency to every 6 hours PRN wheezing or bronchospasm, if no treatments needed after 48 hours then discontinue using Per Protocol order mode.     If indication present, adjust the RT bronchodilator orders based on the Bronchodilator Assessment Score as indicated below.  Use Inhaler orders unless patient has one or more of the following: on home nebulizer, not able to hold breath for 10 seconds, is not alert and oriented, cannot activate and use MDI correctly,

## 2024-06-13 NOTE — CARE COORDINATION
6/13/24, 2:43 PM EDT    DISCHARGE ON GOING EVALUATION    Toshia FIGUEROA [facility] day: 3  Location: -06/006-A Reason for admit: COVID [U07.1]  Acute respiratory failure with hypoxia (HCC) [J96.01]  Hypoxic respiratory failure (HCC) [J96.91]     Procedures:   6/10 Intubated  6/12 Extubated  6/13 MBS: Passed    Imaging since last note:   6/13 CXR: 1. Borderline heart size. Prior anterior cervical fusion. Vascular clips in the  lower cervical region. Neurostimulator leads project over the mid thoracic  spine.  2. Moderate pneumonia/pulmonary edema involving both lungs diffusely. Overall  appearance improved from prior.    Barriers to Discharge: Extubated yesterday. Passed MBS today and started on soft/bite-sized diet with nectar thick liquids.     On 4L O2 - sats 96%. Afebrile. NSR. Oriented x3, not situation. Follows commands. SLP/PT/OT. +COVID 19. Respiratory culture +gram neg bacilli. +MRSA nares. Telemetry, external urinary catheter, SCDs. Nebs, Asa, baricitinib, peridex, IV decadron 6 mg daily, cymbalta, synthroid, prn IV lopressor, lopressor, IV protonix, IV zosyn, xarelto, zoloft, trazodone, IV vancomycin, Electrolyte replacement protocols.      PCP: Jong Moraes MD  Readmission Risk Score: 20.4    Patient Goals/Plan/Treatment Preferences: Home w/. Uses a cane. Has PRN home O2 thru [email or web address]. Monitor for needs.

## 2024-06-13 NOTE — PROCEDURES
PROCEDURE NOTE  Date: 6/13/2024   Name: Toshia South  YOB: 1945    Procedures  Bladder Scan completed 171ML

## 2024-06-14 PROBLEM — I48.0 PAF (PAROXYSMAL ATRIAL FIBRILLATION) (HCC): Status: ACTIVE | Noted: 2022-02-01

## 2024-06-14 PROBLEM — J84.112 IPF (IDIOPATHIC PULMONARY FIBROSIS) (HCC): Status: ACTIVE | Noted: 2022-04-29

## 2024-06-14 PROBLEM — J15.0 PNEUMONIA DUE TO KLEBSIELLA PNEUMONIAE (HCC): Status: ACTIVE | Noted: 2024-06-14

## 2024-06-14 PROBLEM — J80 ARDS (ADULT RESPIRATORY DISTRESS SYNDROME) (HCC): Status: ACTIVE | Noted: 2024-06-14

## 2024-06-14 LAB
ANION GAP SERPL CALC-SCNC: 9 MEQ/L (ref 8–16)
BASOPHILS ABSOLUTE: 0 THOU/MM3 (ref 0–0.1)
BASOPHILS NFR BLD AUTO: 0.2 %
BUN SERPL-MCNC: 26 MG/DL (ref 7–22)
CALCIUM SERPL-MCNC: 8.8 MG/DL (ref 8.5–10.5)
CHLORIDE SERPL-SCNC: 104 MEQ/L (ref 98–111)
CO2 SERPL-SCNC: 27 MEQ/L (ref 23–33)
CREAT SERPL-MCNC: 0.4 MG/DL (ref 0.4–1.2)
DEPRECATED RDW RBC AUTO: 49.3 FL (ref 35–45)
EOSINOPHIL NFR BLD AUTO: 0 %
EOSINOPHILS ABSOLUTE: 0 THOU/MM3 (ref 0–0.4)
ERYTHROCYTE [DISTWIDTH] IN BLOOD BY AUTOMATED COUNT: 13.9 % (ref 11.5–14.5)
GFR SERPL CREATININE-BSD FRML MDRD: > 90 ML/MIN/1.73M2
GLUCOSE SERPL-MCNC: 104 MG/DL (ref 70–108)
HCT VFR BLD AUTO: 40.8 % (ref 37–47)
HGB BLD-MCNC: 13.2 GM/DL (ref 12–16)
IMM GRANULOCYTES # BLD AUTO: 0.05 THOU/MM3 (ref 0–0.07)
IMM GRANULOCYTES NFR BLD AUTO: 0.5 %
LYMPHOCYTES ABSOLUTE: 1 THOU/MM3 (ref 1–4.8)
LYMPHOCYTES NFR BLD AUTO: 10.5 %
MAGNESIUM SERPL-MCNC: 2 MG/DL (ref 1.6–2.4)
MCH RBC QN AUTO: 31.1 PG (ref 26–33)
MCHC RBC AUTO-ENTMCNC: 32.4 GM/DL (ref 32.2–35.5)
MCV RBC AUTO: 96.2 FL (ref 81–99)
MONOCYTES ABSOLUTE: 0.7 THOU/MM3 (ref 0.4–1.3)
MONOCYTES NFR BLD AUTO: 6.8 %
NEUTROPHILS ABSOLUTE: 8 THOU/MM3 (ref 1.8–7.7)
NEUTROPHILS NFR BLD AUTO: 82 %
NRBC BLD AUTO-RTO: 0 /100 WBC
PLATELET # BLD AUTO: 211 THOU/MM3 (ref 130–400)
PMV BLD AUTO: 9.7 FL (ref 9.4–12.4)
POTASSIUM SERPL-SCNC: 3.4 MEQ/L (ref 3.5–5.2)
POTASSIUM SERPL-SCNC: 4 MEQ/L (ref 3.5–5.2)
RBC # BLD AUTO: 4.24 MILL/MM3 (ref 4.2–5.4)
SODIUM SERPL-SCNC: 140 MEQ/L (ref 135–145)
VANCOMYCIN SERPL-MCNC: 14.7 UG/ML (ref 0.1–39.9)
WBC # BLD AUTO: 9.8 THOU/MM3 (ref 4.8–10.8)

## 2024-06-14 PROCEDURE — 97530 THERAPEUTIC ACTIVITIES: CPT

## 2024-06-14 PROCEDURE — 93005 ELECTROCARDIOGRAM TRACING: CPT

## 2024-06-14 PROCEDURE — 6360000002 HC RX W HCPCS: Performed by: INTERNAL MEDICINE

## 2024-06-14 PROCEDURE — 94669 MECHANICAL CHEST WALL OSCILL: CPT

## 2024-06-14 PROCEDURE — C9113 INJ PANTOPRAZOLE SODIUM, VIA: HCPCS | Performed by: INTERNAL MEDICINE

## 2024-06-14 PROCEDURE — 2580000003 HC RX 258

## 2024-06-14 PROCEDURE — 6370000000 HC RX 637 (ALT 250 FOR IP)

## 2024-06-14 PROCEDURE — 6370000000 HC RX 637 (ALT 250 FOR IP): Performed by: INTERNAL MEDICINE

## 2024-06-14 PROCEDURE — 6360000002 HC RX W HCPCS

## 2024-06-14 PROCEDURE — 84132 ASSAY OF SERUM POTASSIUM: CPT

## 2024-06-14 PROCEDURE — 94640 AIRWAY INHALATION TREATMENT: CPT

## 2024-06-14 PROCEDURE — 51798 US URINE CAPACITY MEASURE: CPT

## 2024-06-14 PROCEDURE — 2060000000 HC ICU INTERMEDIATE R&B

## 2024-06-14 PROCEDURE — 80048 BASIC METABOLIC PNL TOTAL CA: CPT

## 2024-06-14 PROCEDURE — 80202 ASSAY OF VANCOMYCIN: CPT

## 2024-06-14 PROCEDURE — 85025 COMPLETE CBC W/AUTO DIFF WBC: CPT

## 2024-06-14 PROCEDURE — 97110 THERAPEUTIC EXERCISES: CPT

## 2024-06-14 PROCEDURE — 83735 ASSAY OF MAGNESIUM: CPT

## 2024-06-14 PROCEDURE — 99233 SBSQ HOSP IP/OBS HIGH 50: CPT | Performed by: INTERNAL MEDICINE

## 2024-06-14 PROCEDURE — 36415 COLL VENOUS BLD VENIPUNCTURE: CPT

## 2024-06-14 RX ORDER — TRAZODONE HYDROCHLORIDE 100 MG/1
200 TABLET ORAL NIGHTLY
Status: DISCONTINUED | OUTPATIENT
Start: 2024-06-14 | End: 2024-06-17 | Stop reason: HOSPADM

## 2024-06-14 RX ADMIN — DULOXETINE HYDROCHLORIDE 60 MG: 60 CAPSULE, DELAYED RELEASE ORAL at 09:05

## 2024-06-14 RX ADMIN — PANTOPRAZOLE SODIUM 40 MG: 40 INJECTION, POWDER, FOR SOLUTION INTRAVENOUS at 20:28

## 2024-06-14 RX ADMIN — POTASSIUM CHLORIDE 20 MEQ: 29.8 INJECTION, SOLUTION INTRAVENOUS at 08:15

## 2024-06-14 RX ADMIN — TIOTROPIUM BROMIDE AND OLODATEROL 2 PUFF: 3.124; 2.736 SPRAY, METERED RESPIRATORY (INHALATION) at 10:09

## 2024-06-14 RX ADMIN — Medication 1500 MG: at 09:20

## 2024-06-14 RX ADMIN — RIVAROXABAN 20 MG: 20 TABLET, FILM COATED ORAL at 18:08

## 2024-06-14 RX ADMIN — SODIUM CHLORIDE, PRESERVATIVE FREE 10 ML: 5 INJECTION INTRAVENOUS at 09:06

## 2024-06-14 RX ADMIN — METOPROLOL TARTRATE 25 MG: 25 TABLET, FILM COATED ORAL at 20:28

## 2024-06-14 RX ADMIN — POTASSIUM CHLORIDE 20 MEQ: 29.8 INJECTION, SOLUTION INTRAVENOUS at 10:14

## 2024-06-14 RX ADMIN — CHLORHEXIDINE GLUCONATE 0.12% ORAL RINSE 15 ML: 1.2 LIQUID ORAL at 09:05

## 2024-06-14 RX ADMIN — SERTRALINE HYDROCHLORIDE 50 MG: 50 TABLET ORAL at 09:05

## 2024-06-14 RX ADMIN — LEVOTHYROXINE SODIUM 200 MCG: 0.1 TABLET ORAL at 05:24

## 2024-06-14 RX ADMIN — BARICITINIB 4 MG: 2 TABLET, FILM COATED ORAL at 09:04

## 2024-06-14 RX ADMIN — ALBUTEROL SULFATE 2 PUFF: 90 AEROSOL, METERED RESPIRATORY (INHALATION) at 10:09

## 2024-06-14 RX ADMIN — PIPERACILLIN AND TAZOBACTAM 3375 MG: 3; .375 INJECTION, POWDER, FOR SOLUTION INTRAVENOUS at 08:10

## 2024-06-14 RX ADMIN — TRAZODONE HYDROCHLORIDE 200 MG: 100 TABLET ORAL at 21:22

## 2024-06-14 RX ADMIN — CEFTRIAXONE SODIUM 1000 MG: 1 INJECTION, POWDER, FOR SOLUTION INTRAMUSCULAR; INTRAVENOUS at 12:38

## 2024-06-14 RX ADMIN — PANTOPRAZOLE SODIUM 40 MG: 40 INJECTION, POWDER, FOR SOLUTION INTRAVENOUS at 09:04

## 2024-06-14 RX ADMIN — ASPIRIN 81 MG: 81 TABLET, COATED ORAL at 09:04

## 2024-06-14 RX ADMIN — CHLORHEXIDINE GLUCONATE 0.12% ORAL RINSE 15 ML: 1.2 LIQUID ORAL at 20:28

## 2024-06-14 RX ADMIN — HYDRALAZINE HYDROCHLORIDE 5 MG: 20 INJECTION, SOLUTION INTRAMUSCULAR; INTRAVENOUS at 21:29

## 2024-06-14 RX ADMIN — SODIUM CHLORIDE, PRESERVATIVE FREE 10 ML: 5 INJECTION INTRAVENOUS at 20:29

## 2024-06-14 RX ADMIN — DEXAMETHASONE SODIUM PHOSPHATE 20 MG: 4 INJECTION, SOLUTION INTRAMUSCULAR; INTRAVENOUS at 08:21

## 2024-06-14 ASSESSMENT — PAIN SCALES - GENERAL
PAINLEVEL_OUTOF10: 0
PAINLEVEL_OUTOF10: 0

## 2024-06-14 NOTE — CARE COORDINATION
6/14/24, 10:25 AM EDT    DISCHARGE PLANNING EVALUATION    Attempted to speak with patient about home health, however RN is in room at this time and asked SW to come back later.

## 2024-06-14 NOTE — PROCEDURES
PROCEDURE NOTE  Date: 6/14/2024   Name: Toshia South  YOB: 1945    Procedures  Bladder scan completed at 0630 and results told to Milagro WONG. Scan showed 595 mL in the patients bladder. Last void was last night.

## 2024-06-14 NOTE — RT PROTOCOL NOTE
RT Inhaler-Nebulizer Bronchodilator Protocol Note    There is a bronchodilator order in the chart from a provider indicating to follow the RT Bronchodilator Protocol and there is an “Initiate RT Inhaler-Nebulizer Bronchodilator Protocol” order as well (see protocol at bottom of note).    CXR Findings:  XR CHEST PORTABLE    Result Date: 6/13/2024  1. Borderline heart size. Prior anterior cervical fusion. Vascular clips in the lower cervical region. Neurostimulator leads project over the mid thoracic spine. 2. Moderate pneumonia/pulmonary edema involving both lungs diffusely. Overall appearance improved from prior. **This report has been created using voice recognition software.  It may contain minor errors which are inherent in voice recognition technology.** Electronically signed by Dr. Philip Dennis      The findings from the last RT Protocol Assessment were as follows:   History Pulmonary Disease: Chronic pulmonary disease  Respiratory Pattern: Regular pattern and RR 12-20 bpm  Breath Sounds: Slightly diminished and/or crackles  Cough: Strong, spontaneous, non-productive  Indication for Bronchodilator Therapy: Decreased or absent breath sounds, On home bronchodilators (prn at home)  Bronchodilator Assessment Score: 4    Aerosolized bronchodilator medication orders have been revised according to the RT Inhaler-Nebulizer Bronchodilator Protocol below.    Respiratory Therapist to perform RT Therapy Protocol Assessment initially then follow the protocol.  Repeat RT Therapy Protocol Assessment PRN for score 0-3 or on second treatment, BID, and PRN for scores above 3.    No Indications - adjust the frequency to every 6 hours PRN wheezing or bronchospasm, if no treatments needed after 48 hours then discontinue using Per Protocol order mode.     If indication present, adjust the RT bronchodilator orders based on the Bronchodilator Assessment Score as indicated below.  Use Inhaler orders unless patient has one or more of

## 2024-06-15 LAB
ANION GAP SERPL CALC-SCNC: 8 MEQ/L (ref 8–16)
BACTERIA BLD AEROBE CULT: NORMAL
BACTERIA BLD AEROBE CULT: NORMAL
BASOPHILS ABSOLUTE: 0 THOU/MM3 (ref 0–0.1)
BASOPHILS NFR BLD AUTO: 0.2 %
BUN SERPL-MCNC: 19 MG/DL (ref 7–22)
CALCIUM SERPL-MCNC: 8.9 MG/DL (ref 8.5–10.5)
CHLORIDE SERPL-SCNC: 103 MEQ/L (ref 98–111)
CO2 SERPL-SCNC: 28 MEQ/L (ref 23–33)
CREAT SERPL-MCNC: 0.4 MG/DL (ref 0.4–1.2)
DEPRECATED RDW RBC AUTO: 45.8 FL (ref 35–45)
EOSINOPHIL NFR BLD AUTO: 0.4 %
EOSINOPHILS ABSOLUTE: 0 THOU/MM3 (ref 0–0.4)
ERYTHROCYTE [DISTWIDTH] IN BLOOD BY AUTOMATED COUNT: 13.3 % (ref 11.5–14.5)
GFR SERPL CREATININE-BSD FRML MDRD: > 90 ML/MIN/1.73M2
GLUCOSE SERPL-MCNC: 81 MG/DL (ref 70–108)
HCT VFR BLD AUTO: 40.8 % (ref 37–47)
HGB BLD-MCNC: 13.5 GM/DL (ref 12–16)
IMM GRANULOCYTES # BLD AUTO: 0.21 THOU/MM3 (ref 0–0.07)
IMM GRANULOCYTES NFR BLD AUTO: 2.6 %
LYMPHOCYTES ABSOLUTE: 1.7 THOU/MM3 (ref 1–4.8)
LYMPHOCYTES NFR BLD AUTO: 20.9 %
MAGNESIUM SERPL-MCNC: 2 MG/DL (ref 1.6–2.4)
MCH RBC QN AUTO: 31 PG (ref 26–33)
MCHC RBC AUTO-ENTMCNC: 33.1 GM/DL (ref 32.2–35.5)
MCV RBC AUTO: 93.8 FL (ref 81–99)
MICROORGANISM SPEC CULT: NORMAL
MICROORGANISM SPEC CULT: NORMAL
MONOCYTES ABSOLUTE: 1 THOU/MM3 (ref 0.4–1.3)
MONOCYTES NFR BLD AUTO: 12 %
NEUTROPHILS ABSOLUTE: 5.2 THOU/MM3 (ref 1.8–7.7)
NEUTROPHILS NFR BLD AUTO: 63.9 %
NRBC BLD AUTO-RTO: 0 /100 WBC
PLATELET # BLD AUTO: 233 THOU/MM3 (ref 130–400)
PMV BLD AUTO: 9.8 FL (ref 9.4–12.4)
POTASSIUM SERPL-SCNC: 3.6 MEQ/L (ref 3.5–5.2)
RBC # BLD AUTO: 4.35 MILL/MM3 (ref 4.2–5.4)
SERVICE CMNT-IMP: NORMAL
SERVICE CMNT-IMP: NORMAL
SODIUM SERPL-SCNC: 139 MEQ/L (ref 135–145)
SPECIMEN DESCRIPTION: NORMAL
SPECIMEN DESCRIPTION: NORMAL
WBC # BLD AUTO: 8.2 THOU/MM3 (ref 4.8–10.8)

## 2024-06-15 PROCEDURE — 6360000002 HC RX W HCPCS

## 2024-06-15 PROCEDURE — C9113 INJ PANTOPRAZOLE SODIUM, VIA: HCPCS | Performed by: INTERNAL MEDICINE

## 2024-06-15 PROCEDURE — 2060000000 HC ICU INTERMEDIATE R&B

## 2024-06-15 PROCEDURE — 94640 AIRWAY INHALATION TREATMENT: CPT

## 2024-06-15 PROCEDURE — 2580000003 HC RX 258

## 2024-06-15 PROCEDURE — 6370000000 HC RX 637 (ALT 250 FOR IP)

## 2024-06-15 PROCEDURE — 94761 N-INVAS EAR/PLS OXIMETRY MLT: CPT

## 2024-06-15 PROCEDURE — 6360000002 HC RX W HCPCS: Performed by: INTERNAL MEDICINE

## 2024-06-15 PROCEDURE — 6370000000 HC RX 637 (ALT 250 FOR IP): Performed by: INTERNAL MEDICINE

## 2024-06-15 PROCEDURE — 94669 MECHANICAL CHEST WALL OSCILL: CPT

## 2024-06-15 PROCEDURE — 80048 BASIC METABOLIC PNL TOTAL CA: CPT

## 2024-06-15 PROCEDURE — 99232 SBSQ HOSP IP/OBS MODERATE 35: CPT | Performed by: INTERNAL MEDICINE

## 2024-06-15 PROCEDURE — 83735 ASSAY OF MAGNESIUM: CPT

## 2024-06-15 PROCEDURE — 36415 COLL VENOUS BLD VENIPUNCTURE: CPT

## 2024-06-15 PROCEDURE — 2700000000 HC OXYGEN THERAPY PER DAY

## 2024-06-15 PROCEDURE — 85025 COMPLETE CBC W/AUTO DIFF WBC: CPT

## 2024-06-15 RX ORDER — PANTOPRAZOLE SODIUM 40 MG/1
40 TABLET, DELAYED RELEASE ORAL
Status: DISCONTINUED | OUTPATIENT
Start: 2024-06-15 | End: 2024-06-17 | Stop reason: HOSPADM

## 2024-06-15 RX ORDER — POTASSIUM CHLORIDE 20 MEQ/1
40 TABLET, EXTENDED RELEASE ORAL ONCE
Status: COMPLETED | OUTPATIENT
Start: 2024-06-15 | End: 2024-06-15

## 2024-06-15 RX ADMIN — CEFTRIAXONE SODIUM 1000 MG: 1 INJECTION, POWDER, FOR SOLUTION INTRAMUSCULAR; INTRAVENOUS at 12:05

## 2024-06-15 RX ADMIN — SODIUM CHLORIDE, PRESERVATIVE FREE 10 ML: 5 INJECTION INTRAVENOUS at 19:20

## 2024-06-15 RX ADMIN — TIOTROPIUM BROMIDE AND OLODATEROL 2 PUFF: 3.124; 2.736 SPRAY, METERED RESPIRATORY (INHALATION) at 07:56

## 2024-06-15 RX ADMIN — TRAZODONE HYDROCHLORIDE 200 MG: 100 TABLET ORAL at 21:16

## 2024-06-15 RX ADMIN — POTASSIUM CHLORIDE 40 MEQ: 1500 TABLET, EXTENDED RELEASE ORAL at 09:45

## 2024-06-15 RX ADMIN — PANTOPRAZOLE SODIUM 40 MG: 40 TABLET, DELAYED RELEASE ORAL at 17:29

## 2024-06-15 RX ADMIN — SERTRALINE HYDROCHLORIDE 50 MG: 50 TABLET ORAL at 08:04

## 2024-06-15 RX ADMIN — ASPIRIN 81 MG: 81 TABLET, COATED ORAL at 08:04

## 2024-06-15 RX ADMIN — PANTOPRAZOLE SODIUM 40 MG: 40 INJECTION, POWDER, FOR SOLUTION INTRAVENOUS at 08:04

## 2024-06-15 RX ADMIN — ALBUTEROL SULFATE 2 PUFF: 90 AEROSOL, METERED RESPIRATORY (INHALATION) at 18:06

## 2024-06-15 RX ADMIN — CHLORHEXIDINE GLUCONATE 0.12% ORAL RINSE 15 ML: 1.2 LIQUID ORAL at 08:04

## 2024-06-15 RX ADMIN — BARICITINIB 4 MG: 2 TABLET, FILM COATED ORAL at 08:04

## 2024-06-15 RX ADMIN — RIVAROXABAN 20 MG: 20 TABLET, FILM COATED ORAL at 17:29

## 2024-06-15 RX ADMIN — DEXAMETHASONE SODIUM PHOSPHATE 20 MG: 4 INJECTION, SOLUTION INTRAMUSCULAR; INTRAVENOUS at 09:45

## 2024-06-15 RX ADMIN — ACETAMINOPHEN 650 MG: 325 TABLET ORAL at 17:51

## 2024-06-15 RX ADMIN — ALBUTEROL SULFATE 2 PUFF: 90 AEROSOL, METERED RESPIRATORY (INHALATION) at 07:54

## 2024-06-15 RX ADMIN — SODIUM CHLORIDE, PRESERVATIVE FREE 10 ML: 5 INJECTION INTRAVENOUS at 08:04

## 2024-06-15 RX ADMIN — LEVOTHYROXINE SODIUM 200 MCG: 0.1 TABLET ORAL at 06:02

## 2024-06-15 RX ADMIN — METOPROLOL TARTRATE 25 MG: 25 TABLET, FILM COATED ORAL at 08:04

## 2024-06-15 RX ADMIN — DULOXETINE HYDROCHLORIDE 60 MG: 60 CAPSULE, DELAYED RELEASE ORAL at 08:04

## 2024-06-15 RX ADMIN — CHLORHEXIDINE GLUCONATE 0.12% ORAL RINSE 15 ML: 1.2 LIQUID ORAL at 19:21

## 2024-06-15 ASSESSMENT — PAIN DESCRIPTION - DESCRIPTORS: DESCRIPTORS: DULL

## 2024-06-15 ASSESSMENT — PAIN DESCRIPTION - LOCATION: LOCATION: HEAD

## 2024-06-15 NOTE — RT PROTOCOL NOTE
RT Inhaler-Nebulizer Bronchodilator Protocol Note    There is a bronchodilator order in the chart from a provider indicating to follow the RT Bronchodilator Protocol and there is an “Initiate RT Inhaler-Nebulizer Bronchodilator Protocol” order as well (see protocol at bottom of note).    CXR Findings:  XR CHEST PORTABLE    Result Date: 6/13/2024  1. Borderline heart size. Prior anterior cervical fusion. Vascular clips in the lower cervical region. Neurostimulator leads project over the mid thoracic spine. 2. Moderate pneumonia/pulmonary edema involving both lungs diffusely. Overall appearance improved from prior. **This report has been created using voice recognition software.  It may contain minor errors which are inherent in voice recognition technology.** Electronically signed by Dr. Philip Dennis      The findings from the last RT Protocol Assessment were as follows:   History Pulmonary Disease: Chronic pulmonary disease  Respiratory Pattern: Regular pattern and RR 12-20 bpm  Breath Sounds: Slightly diminished and/or crackles  Cough: Strong, spontaneous, non-productive  Indication for Bronchodilator Therapy: Decreased or absent breath sounds, On home bronchodilators  Bronchodilator Assessment Score: 4    Aerosolized bronchodilator medication orders have been revised according to the RT Inhaler-Nebulizer Bronchodilator Protocol below.    Respiratory Therapist to perform RT Therapy Protocol Assessment initially then follow the protocol.  Repeat RT Therapy Protocol Assessment PRN for score 0-3 or on second treatment, BID, and PRN for scores above 3.    No Indications - adjust the frequency to every 6 hours PRN wheezing or bronchospasm, if no treatments needed after 48 hours then discontinue using Per Protocol order mode.     If indication present, adjust the RT bronchodilator orders based on the Bronchodilator Assessment Score as indicated below.  Use Inhaler orders unless patient has one or more of the following:

## 2024-06-15 NOTE — PROCEDURES
PROCEDURE NOTE  Date: 6/14/2024   Name: Toshia South  YOB: 1945    Procedures        EKG was completed and handed to Parvin WONG

## 2024-06-16 LAB
ANION GAP SERPL CALC-SCNC: 8 MEQ/L (ref 8–16)
BUN SERPL-MCNC: 19 MG/DL (ref 7–22)
CALCIUM SERPL-MCNC: 8.9 MG/DL (ref 8.5–10.5)
CHLORIDE SERPL-SCNC: 100 MEQ/L (ref 98–111)
CO2 SERPL-SCNC: 28 MEQ/L (ref 23–33)
CREAT SERPL-MCNC: 0.3 MG/DL (ref 0.4–1.2)
DEPRECATED RDW RBC AUTO: 44.1 FL (ref 35–45)
EKG ATRIAL RATE: 60 BPM
EKG P AXIS: 52 DEGREES
EKG P-R INTERVAL: 176 MS
EKG Q-T INTERVAL: 450 MS
EKG QRS DURATION: 84 MS
EKG QTC CALCULATION (BAZETT): 450 MS
EKG R AXIS: -9 DEGREES
EKG T AXIS: 36 DEGREES
EKG VENTRICULAR RATE: 60 BPM
ERYTHROCYTE [DISTWIDTH] IN BLOOD BY AUTOMATED COUNT: 13 % (ref 11.5–14.5)
GFR SERPL CREATININE-BSD FRML MDRD: > 90 ML/MIN/1.73M2
GLUCOSE SERPL-MCNC: 85 MG/DL (ref 70–108)
HCT VFR BLD AUTO: 40.3 % (ref 37–47)
HGB BLD-MCNC: 13.5 GM/DL (ref 12–16)
MAGNESIUM SERPL-MCNC: 1.8 MG/DL (ref 1.6–2.4)
MCH RBC QN AUTO: 30.5 PG (ref 26–33)
MCHC RBC AUTO-ENTMCNC: 33.5 GM/DL (ref 32.2–35.5)
MCV RBC AUTO: 91 FL (ref 81–99)
PLATELET # BLD AUTO: 262 THOU/MM3 (ref 130–400)
PMV BLD AUTO: 9.6 FL (ref 9.4–12.4)
POTASSIUM SERPL-SCNC: 3.8 MEQ/L (ref 3.5–5.2)
RBC # BLD AUTO: 4.43 MILL/MM3 (ref 4.2–5.4)
SODIUM SERPL-SCNC: 136 MEQ/L (ref 135–145)
WBC # BLD AUTO: 5.8 THOU/MM3 (ref 4.8–10.8)

## 2024-06-16 PROCEDURE — 6370000000 HC RX 637 (ALT 250 FOR IP)

## 2024-06-16 PROCEDURE — 2580000003 HC RX 258

## 2024-06-16 PROCEDURE — 6360000002 HC RX W HCPCS

## 2024-06-16 PROCEDURE — 6370000000 HC RX 637 (ALT 250 FOR IP): Performed by: INTERNAL MEDICINE

## 2024-06-16 PROCEDURE — 2700000000 HC OXYGEN THERAPY PER DAY

## 2024-06-16 PROCEDURE — 36415 COLL VENOUS BLD VENIPUNCTURE: CPT

## 2024-06-16 PROCEDURE — 99233 SBSQ HOSP IP/OBS HIGH 50: CPT | Performed by: INTERNAL MEDICINE

## 2024-06-16 PROCEDURE — 94761 N-INVAS EAR/PLS OXIMETRY MLT: CPT

## 2024-06-16 PROCEDURE — 83735 ASSAY OF MAGNESIUM: CPT

## 2024-06-16 PROCEDURE — 94640 AIRWAY INHALATION TREATMENT: CPT

## 2024-06-16 PROCEDURE — 80048 BASIC METABOLIC PNL TOTAL CA: CPT

## 2024-06-16 PROCEDURE — 2060000000 HC ICU INTERMEDIATE R&B

## 2024-06-16 PROCEDURE — 85027 COMPLETE CBC AUTOMATED: CPT

## 2024-06-16 PROCEDURE — 93010 ELECTROCARDIOGRAM REPORT: CPT | Performed by: INTERNAL MEDICINE

## 2024-06-16 RX ORDER — DEXAMETHASONE 4 MG/1
10 TABLET ORAL DAILY
Status: DISCONTINUED | OUTPATIENT
Start: 2024-06-16 | End: 2024-06-16

## 2024-06-16 RX ORDER — DEXAMETHASONE 4 MG/1
10 TABLET ORAL DAILY
Status: DISCONTINUED | OUTPATIENT
Start: 2024-06-16 | End: 2024-06-17 | Stop reason: HOSPADM

## 2024-06-16 RX ADMIN — SODIUM CHLORIDE: 9 INJECTION, SOLUTION INTRAVENOUS at 11:30

## 2024-06-16 RX ADMIN — ASPIRIN 81 MG: 81 TABLET, COATED ORAL at 08:28

## 2024-06-16 RX ADMIN — METOPROLOL TARTRATE 25 MG: 25 TABLET, FILM COATED ORAL at 08:28

## 2024-06-16 RX ADMIN — ALBUTEROL SULFATE 2 PUFF: 90 AEROSOL, METERED RESPIRATORY (INHALATION) at 05:23

## 2024-06-16 RX ADMIN — DEXAMETHASONE 10 MG: 4 TABLET ORAL at 09:36

## 2024-06-16 RX ADMIN — RIVAROXABAN 20 MG: 20 TABLET, FILM COATED ORAL at 18:46

## 2024-06-16 RX ADMIN — PANTOPRAZOLE SODIUM 40 MG: 40 TABLET, DELAYED RELEASE ORAL at 05:01

## 2024-06-16 RX ADMIN — SERTRALINE HYDROCHLORIDE 50 MG: 50 TABLET ORAL at 08:28

## 2024-06-16 RX ADMIN — LEVOTHYROXINE SODIUM 200 MCG: 0.1 TABLET ORAL at 05:04

## 2024-06-16 RX ADMIN — CHLORHEXIDINE GLUCONATE 0.12% ORAL RINSE 15 ML: 1.2 LIQUID ORAL at 08:31

## 2024-06-16 RX ADMIN — BARICITINIB 4 MG: 2 TABLET, FILM COATED ORAL at 08:28

## 2024-06-16 RX ADMIN — DULOXETINE HYDROCHLORIDE 60 MG: 60 CAPSULE, DELAYED RELEASE ORAL at 08:28

## 2024-06-16 RX ADMIN — ALBUTEROL SULFATE 2 PUFF: 90 AEROSOL, METERED RESPIRATORY (INHALATION) at 15:45

## 2024-06-16 RX ADMIN — CEFTRIAXONE SODIUM 1000 MG: 1 INJECTION, POWDER, FOR SOLUTION INTRAMUSCULAR; INTRAVENOUS at 11:31

## 2024-06-16 RX ADMIN — PANTOPRAZOLE SODIUM 40 MG: 40 TABLET, DELAYED RELEASE ORAL at 15:38

## 2024-06-16 RX ADMIN — TRAZODONE HYDROCHLORIDE 200 MG: 100 TABLET ORAL at 23:07

## 2024-06-16 RX ADMIN — TIOTROPIUM BROMIDE AND OLODATEROL 2 PUFF: 3.124; 2.736 SPRAY, METERED RESPIRATORY (INHALATION) at 05:23

## 2024-06-16 RX ADMIN — SODIUM CHLORIDE, PRESERVATIVE FREE 10 ML: 5 INJECTION INTRAVENOUS at 20:09

## 2024-06-16 RX ADMIN — METOPROLOL TARTRATE 25 MG: 25 TABLET, FILM COATED ORAL at 20:08

## 2024-06-16 RX ADMIN — CHLORHEXIDINE GLUCONATE 0.12% ORAL RINSE 15 ML: 1.2 LIQUID ORAL at 20:09

## 2024-06-16 RX ADMIN — SODIUM CHLORIDE, PRESERVATIVE FREE 10 ML: 5 INJECTION INTRAVENOUS at 08:28

## 2024-06-16 ASSESSMENT — PAIN SCALES - GENERAL
PAINLEVEL_OUTOF10: 0
PAINLEVEL_OUTOF10: 0

## 2024-06-17 VITALS
RESPIRATION RATE: 18 BRPM | HEIGHT: 65 IN | DIASTOLIC BLOOD PRESSURE: 58 MMHG | TEMPERATURE: 98 F | BODY MASS INDEX: 22.79 KG/M2 | OXYGEN SATURATION: 94 % | SYSTOLIC BLOOD PRESSURE: 112 MMHG | HEART RATE: 56 BPM | WEIGHT: 136.8 LBS

## 2024-06-17 PROBLEM — J15.0 PNEUMONIA DUE TO KLEBSIELLA PNEUMONIAE (HCC): Status: RESOLVED | Noted: 2024-06-14 | Resolved: 2024-06-17

## 2024-06-17 PROBLEM — J96.91 HYPOXIC RESPIRATORY FAILURE (HCC): Status: RESOLVED | Noted: 2024-06-10 | Resolved: 2024-06-17

## 2024-06-17 PROBLEM — J80 ARDS (ADULT RESPIRATORY DISTRESS SYNDROME) (HCC): Status: RESOLVED | Noted: 2024-06-14 | Resolved: 2024-06-17

## 2024-06-17 PROBLEM — J96.01 ACUTE RESPIRATORY FAILURE WITH HYPOXIA (HCC): Status: RESOLVED | Noted: 2024-06-10 | Resolved: 2024-06-17

## 2024-06-17 PROBLEM — U07.1 COVID: Status: RESOLVED | Noted: 2024-06-10 | Resolved: 2024-06-17

## 2024-06-17 PROCEDURE — 94761 N-INVAS EAR/PLS OXIMETRY MLT: CPT

## 2024-06-17 PROCEDURE — 6360000002 HC RX W HCPCS

## 2024-06-17 PROCEDURE — 6370000000 HC RX 637 (ALT 250 FOR IP)

## 2024-06-17 PROCEDURE — 2700000000 HC OXYGEN THERAPY PER DAY

## 2024-06-17 PROCEDURE — 6370000000 HC RX 637 (ALT 250 FOR IP): Performed by: INTERNAL MEDICINE

## 2024-06-17 PROCEDURE — 94640 AIRWAY INHALATION TREATMENT: CPT

## 2024-06-17 PROCEDURE — 97110 THERAPEUTIC EXERCISES: CPT

## 2024-06-17 PROCEDURE — 97530 THERAPEUTIC ACTIVITIES: CPT

## 2024-06-17 PROCEDURE — 2580000003 HC RX 258

## 2024-06-17 RX ORDER — DEXAMETHASONE 2 MG/1
10 TABLET ORAL DAILY
Qty: 20 TABLET | Refills: 0 | Status: SHIPPED | OUTPATIENT
Start: 2024-06-17 | End: 2024-06-21

## 2024-06-17 RX ORDER — AMOXICILLIN AND CLAVULANATE POTASSIUM 875; 125 MG/1; MG/1
1 TABLET, FILM COATED ORAL 2 TIMES DAILY
Qty: 1 TABLET | Refills: 0 | Status: CANCELLED | OUTPATIENT
Start: 2024-06-17 | End: 2024-06-18

## 2024-06-17 RX ADMIN — BARICITINIB 4 MG: 2 TABLET, FILM COATED ORAL at 08:36

## 2024-06-17 RX ADMIN — DULOXETINE HYDROCHLORIDE 60 MG: 60 CAPSULE, DELAYED RELEASE ORAL at 08:37

## 2024-06-17 RX ADMIN — LEVOTHYROXINE SODIUM 200 MCG: 0.1 TABLET ORAL at 07:30

## 2024-06-17 RX ADMIN — TIOTROPIUM BROMIDE AND OLODATEROL 2 PUFF: 3.124; 2.736 SPRAY, METERED RESPIRATORY (INHALATION) at 08:50

## 2024-06-17 RX ADMIN — SERTRALINE HYDROCHLORIDE 50 MG: 50 TABLET ORAL at 08:37

## 2024-06-17 RX ADMIN — CHLORHEXIDINE GLUCONATE 0.12% ORAL RINSE 15 ML: 1.2 LIQUID ORAL at 08:36

## 2024-06-17 RX ADMIN — ALBUTEROL SULFATE 2 PUFF: 90 AEROSOL, METERED RESPIRATORY (INHALATION) at 08:48

## 2024-06-17 RX ADMIN — SODIUM CHLORIDE, PRESERVATIVE FREE 10 ML: 5 INJECTION INTRAVENOUS at 07:30

## 2024-06-17 RX ADMIN — PANTOPRAZOLE SODIUM 40 MG: 40 TABLET, DELAYED RELEASE ORAL at 07:30

## 2024-06-17 RX ADMIN — ASPIRIN 81 MG: 81 TABLET, COATED ORAL at 08:35

## 2024-06-17 RX ADMIN — DEXAMETHASONE 10 MG: 4 TABLET ORAL at 08:36

## 2024-06-17 RX ADMIN — METOPROLOL TARTRATE 25 MG: 25 TABLET, FILM COATED ORAL at 08:38

## 2024-06-17 NOTE — DISCHARGE SUMMARY
Resident Discharge Summary (Hospitalist)      Patient: Toshia South 78 y.o. female  : 1945  MRN: [id number]   Account: [id number]   Patient's PCP: Jong Moraes MD    Admit Date: 6/10/2024   Discharge Date: 2024      Admitting Physician: Brad Turner MD  Discharge Physician: Raj Donaldson MD       Discharge Diagnoses:    Acute hypoxemic/hypercapnic respiratory failure-secondary to COVID and bacterial pneumonia, aspiration pneumonitis also likely: Intubated at outside facility.  Extubated .  COVID-positive per previous documentation.  Sputum cultures collected 6/10 showed many segmented neutrophils, many gram-positive cocci occurring singly and in pairs, and rare budding yeast.  Culture showed moderate growth of Klebsiella pneumoniae.  PCR from bronchoalveolar lavage on 6/10 showed E. coli, Enterobacter cloacae, Klebsiella aerogenes, Klebsiella pneumoniae, staph aureus (MECa +), and strep agalactiae.  PCR results highly suspicious for aspiration event proceeding collection. aspiration pneumonitis likely played a role in acute hypoxic respiratory failure requiring intubation.              -5 days of 20 mg IV Decadron completed for ARDS.  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 Oral,pharyngeal and esophageal structures otherwise appear normal. There is laryngeal penetration of thin and mildly thick barium with aspiration of thin barium.              -Speech following will provide additional recommendations              -Patient on adult dysphagia diet     Resolved Problems  Septic shock secondary to combined COVID and bacterial pneumonia  Mild ARDS  Lactic acidosis        Chronic

## 2024-06-17 NOTE — PROGRESS NOTES
Hospitalist Progress Note  Internal Medicine Resident      Patient: Toshia South 78 y.o. female      Unit/Bed: -06/006-A    Admit Date: 6/10/2024      ASSESSMENT AND PLAN  Active Problems  Acute hypoxemic/hypercapnic respiratory failure-secondary to COVID and bacterial pneumonia, aspiration pneumonitis also likely: Intubated at outside facility.  Extubated 6/12.  COVID-positive per previous documentation.  Sputum cultures collected 6/10 showed many segmented neutrophils, many gram-positive cocci occurring singly and in pairs, and rare budding yeast.  Culture showed moderate growth of Klebsiella pneumoniae.  PCR from bronchoalveolar lavage on 6/10 showed E. coli, Enterobacter cloacae, Klebsiella aerogenes, Klebsiella pneumoniae, staph aureus (MECa +), and strep agalactiae.  PCR results highly suspicious for aspiration event proceeding collection.  Suggest the aspiration pneumonitis may have played a role in acute hypoxic respiratory failure requiring intubation.              -Will continue Decadron 20 mg IV for 5 days followed by 10 mg p.o. for 5 days for treatment of ARDS   -Continue baricitinib              -Discontinuing Zosyn and Vanco on 6/14.  Sputum cultures were positive for Klebsiella and show only gram-positive cocci in pairs rather than clusters making staph an unlikely cause of pneumonia.   -Starting ceftriaxone 6/14   -Will monitor CBC as well as signs and symptoms for worsening of pneumonia as antibiotics are de-escalated    Dysphagia-patient failed bedside swallow test after extubation: Modified barium swallow showed \" a Zenker's diverticulum in the upper esophagus. Oral,pharyngeal and esophageal structures otherwise appear normal. There is laryngeal penetration of thin and mildly thick barium with aspiration of thin barium.              -Speech following will provide additional recommendations               -Patient on adult dysphagia diet    Paroxysmal atrial fibrillation with recent RVR: 
    Hospitalist Progress Note  Internal Medicine Resident      Patient: Toshia South 78 y.o. female      Unit/Bed: 4K-19/019-A    Admit Date: 6/10/2024      ASSESSMENT AND PLAN  Active Problems  Acute hypoxemic/hypercapnic respiratory failure-secondary to COVID and bacterial pneumonia, aspiration pneumonitis also likely: Intubated at outside facility.  Extubated 6/12.  COVID-positive per previous documentation.  Sputum cultures collected 6/10 showed many segmented neutrophils, many gram-positive cocci occurring singly and in pairs, and rare budding yeast.  Culture showed moderate growth of Klebsiella pneumoniae.  PCR from bronchoalveolar lavage on 6/10 showed E. coli, Enterobacter cloacae, Klebsiella aerogenes, Klebsiella pneumoniae, staph aureus (MECa +), and strep agalactiae.  PCR results highly suspicious for aspiration event proceeding collection. aspiration pneumonitis likely played a role in acute hypoxic respiratory failure requiring intubation.              -5 days of 20 mg IV Decadron completed for ARDS.  Starting 10 mg oral Decadron for 5 days   -Continue baricitinib for 10-day course              -Received Zosyn from 6/11-6/14   -Will continue ceftriaxone 6/14-6/17    Dysphagia-patient failed bedside swallow test after extubation: Modified barium swallow showed  \" a Zenker's diverticulum in the upper esophagus. Oral,pharyngeal and esophageal structures otherwise appear normal. There is laryngeal penetration of thin and mildly thick barium with aspiration of thin barium.              -Speech following will provide additional recommendations               -Patient on adult dysphagia diet    Resolved Problems  Septic shock secondary to combined COVID and bacterial pneumonia  Mild ARDS  Lactic acidosis      Chronic Conditions (reviewed and stable unless otherwise stated)  Paroxysmal atrial fibrillation with recent RVR: [id number].  On Xarelto outpatient without rate control.   -Continue metoprolol 
  CRITICAL CARE PROGRESS NOTE      Patient:  Toshia South    Unit/Bed:4B-06/006-A  YOB: 1945  MRN: [id number]   PCP: Jong Moraes MD  Date of Admission: 6/10/2024  Chief Complaint:-Shortness of breath    Assessment and Plan:    Acute hypoxic/hypercapnic respiratory failure: 2/2 COVID/MRSA-pneumonia.  Intubated at outside facility for increasing respiratory distress.  Continue current ventilator settings, ensure pressure support and not volume support.  Lung protective strategies.  Wean ventilator as tolerated.  Following commands, will add on SLP/PT/OT  Septic shock 2/2 Combined COVID/bacterial-pneumonia: Patient developed hypotension requiring vasopressors.  Lactic acid elevated on admission.  Levophed requirements have been improving. Lactic acid WNL this morning. Will decrease fluids.   Mild ARDS 2/2 Combined COVID-19/bacterial- pneumonia:  +ve COVID-19 test in setting of bilateral infiltrates, acute onset shortness of breath, and temp 100.4.  CRP/D-dimer elevated.  Is fully vaccinated per .  PF ratio >300 on admission, repeat 280 on 6/11.  Sofa score 9.  Procalcitonin 23.07.  MRSA PCR positive.  Pneumonia panel: Enterobacter cloacae, E. coli, Klebsiella aerogenes, Klebsiella pneumoniae, Staph aureus w/ meca/c gene, and strep agalactiae.  Respiratory culture showing many segmented neutrophils as well as many gram-positive cocci. Likely aspiration in origin given number of bacteria on pneumonia panel.  Started on daily Decadron and baricitinib. Will increase decadron to 20mg x5 days followed by 10mg x5 days for tx of ARDS. No need to prone at this time.  Will start on vancomycin given pneumonia panel/preliminary respiratory culture results, continue to follow results and adjust antibiotics as necessary  Will also add on Zosyn for anaerobic coverage.  Repeat CXR 6/11 showing consolidating opacities in the right and left lung.  Hx of COPD and Pulm Fibrosis: On 2 L NC at baseline.  
  CRITICAL CARE PROGRESS NOTE      Patient:  Toshia South    Unit/Bed:4B-06/006-A  YOB: 1945  MRN: [id number]   PCP: Jong Moraes MD  Date of Admission: 6/10/2024  Chief Complaint:-Shortness of breath    Assessment and Plan:    Acute hypoxic/hypercapnic respiratory failure: 2/2 COVID/MRSA-pneumonia.  Intubated at outside facility for increasing respiratory distress.  Continue current ventilator settings, ensure pressure support and not volume support.  Lung protective strategies.  Wean ventilator as tolerated.  Following commands, will add on SLP/PT/OT  Extubated 6/12/2024, currently on 4 LNC.  Will likely be able to transfer out of ICU today.  Septic shock 2/2 Combined COVID/bacterial-pneumonia: Patient developed hypotension requiring vasopressors.  Lactic acid elevated on admission.  Levophed has stopped. Lactic acid now WNL. Fluids discontinued in setting of ARDS.   Mild ARDS 2/2 Combined COVID-19/bacterial- pneumonia:  +ve COVID-19 test in setting of bilateral infiltrates, acute onset shortness of breath, and temp 100.4.  CRP/D-dimer elevated.  Is fully vaccinated per .  PF ratio >300 on admission, repeat 280 on 6/11.  Sofa score 9.  Procalcitonin 23.07.  MRSA PCR positive.  Pneumonia panel: Enterobacter cloacae, E. coli, Klebsiella aerogenes, Klebsiella pneumoniae, Staph aureus w/ meca/c gene, and strep agalactiae.  Respiratory culture showing many segmented neutrophils as well as many gram-positive cocci. Likely aspiration in origin given number of bacteria on pneumonia panel.  Continue decadron to 20mg x5 days followed by 10mg x5 days for tx of ARDS. No need to prone at this time.  Continue vancomycin and Zosyn, follow cultures.  CXR 6/12 showing improving aeration.  Paroxysmal atrial fibrillation w/ RVR, improved: [id number] 4.  On Xarelto outpatient, not on any rate/rhythm control.  Continue Xarelto at this time.  S/p Lopressor 5 mg 6/12 with dramatic improvement in HR 
A home oxygen evaluation has been completed.     [x]Patient is an inpatient. It is expected that the patient will be discharged within the next 48 hours. Qualified provider to write order for home prescription if patient qualifies. Social service/care managers will arrange for home oxygen.  If patient is active, arrange for Home Medical supplier to assess for Oxygen Conserving Device per pulse oximetry.  []Patient is an outpatient. Results will be faxed to the ordering provider. Qualified provider to write order for home prescription if patient qualifies and arranges for home oxygen.      Patient was placed on room air for 60 minutes. SpO2 was 88 % on room air at rest. Patients SpO2 was below 89% and qualified for home oxygen. Oxygen was applied at 1 lpm via nasal cannula to maintain a SpO2 between 90-92% while at rest. Actual SpO2 was 92 %. Patient can ambulate for exercise flow rate. Patients was ambulated, SpO2 was 90% on 3 lpm to maintain SpO2 between 90-92% while exercising.      
[facility]  SPEECH THERAPY  STRZ CVICU 4B  Clinical Swallow Evaluation      SLP Individual Minutes  Time In: 08  Time Out: 08  Minutes: 9  Timed Code Treatment Minutes: 0 Minutes       DIET ORDER RECOMMENDATIONS AFTER EVALUATION: NPO; [facility] [place]     Date: 2024  Patient Name: Toshia South      CSN: [id number]   : 1945  (78 y.o.)  Gender: female   Referring Physician:  Brad Foley DO     Diagnosis: COVID    History of Present Illness/Injury: Patient admit to [facility] with above medical dx. Please refer to physician H&P for full patient medical history. Intubated on 6/10. Extubated on . Patient h/o COPD. Concerns for recurrent PNA.     Past Medical History:   Diagnosis Date    A-fib (HCC)     Anxiety     Atrial fibrillation (HCC)     Biventricular congestive heart failure (HCC)     Bronchiectasis with acute exacerbation (HCC) 2022    CAD (coronary artery disease)     Chronic pain syndrome     dilaudid infusion pump    COPD (chronic obstructive pulmonary disease) (HCC)     Depression     GERD (gastroesophageal reflux disease)     Hypothyroidism     Impaired fasting glucose     Iron deficiency anemia     Osteoporosis     Pulmonary fibrosis (HCC) 2022    Subdural hematoma ([facility]) 2022       SUBJECTIVE:  Session approved by Parvin WONG. Patient seen upright in bed. Pleasant and cooperative.     OBJECTIVE:    Pain:  No pain reported.    Current Diet: NPO     Respiratory Status:  Nasal Canula: 4LPM    Behavioral Observation:  Alert and cooperative    CRANIAL NERVE ASSESSMENT   CN V (Trigeminal) Closes and Opens Mandible WFL    Rotary Jaw Movement Not Tested      CN VII (Facial) Cheeks Hold Food out of Sulci Not Tested    Opens, Closes/Seals, Protrudes, Retracts Lips WFL    General Appearance WFL    Sensation Not Tested      CN X (Vagus - Pharyngeal) Raises Back of Tongue Not Tested      CN XI (Accessory) Lifts Soft Palate WFL      CN XII (Hypoglossal) Elevates 
[facility]  SPEECH THERAPY  STRZ CVICU 4B  Modified Barium Swallow + Dysphagia therapy    SLP Individual Minutes  Time In: 1020  Time Out: 1040  Minutes: 20  Timed Code Treatment Minutes: 0 Minutes   MBS: 12 minutes  Dysphagia therapy: 8 minutes     DIET ORDER RECOMMENDATIONS AFTER EVALUATION: Soft/bite sized diet and mildly thick liquids (avoid purees); OP GI consultation     Date: 2024  Patient Name: Toshia South      CSN: [id number]   : 1945  (78 y.o.)  Gender: female   Referring Physician:  Eron Song DO  Diagnosis: COVID   Precautions: Fall risk, aspiration precautions   History of Present Illness/Injury: Patient admit to [facility] with above medical dx. Please refer to physician H&P for full patient medical history. Intubated on 6/10. Extubated on . Patient h/o COPD. Concerns for recurrent PNA.      has a past medical history of A-fib ([facility]), Anxiety, Atrial fibrillation ([facility]), Biventricular congestive heart failure ([facility]), Bronchiectasis with acute exacerbation ([facility]), CAD (coronary artery disease), Chronic pain syndrome, COPD (chronic obstructive pulmonary disease) ([facility]), Depression, GERD (gastroesophageal reflux disease), Hypothyroidism, Impaired fasting glucose, Iron deficiency anemia, Osteoporosis, Pulmonary fibrosis (HCC), and Subdural hematoma ([facility]).  Current Diet: NPO     Pain: No pain reported.    SUBJECTIVE:  Patient brought down to fluoroscopy suite via bed. Alert and cooperative.     OBJECTIVE:    Respiratory Status:  Nasal Canula: 4LPM     Behavioral Observation:  Alert and cooperative    PATIENT WAS EVALUATED USING:  Barium:Thin Liquids, Mildly Thick Liquids, Puree, Soft Solids, Coarse Solids, and Mixed Consistency    ORAL PHASE BRETT SCORE: (Dysphagia outcome and severity scale)  4 = Mild-Moderate Dysphagia - May have one or two diet consistencies restricted - Oral residue clears with cue - Intermittent supervision or cueing    PHARYNGEAL PHASE BRETT 
[facility]  SPEECH THERAPY MISSED TREATMENT NOTE  STRZ CVICU 4B      Date: 2024  Patient Name: Toshia South        MRN: [id number]    : 1945  (78 y.o.)    REASON FOR MISSED TREATMENT:  Spoke with RNGerardo who reports patient extubated this date. Inappropriate for CSE s/t continued weaning from sedation. Will check back tomorrow, .     Rebeca Gresham M.S. CCC-SLP [id number] 2024    
Comprehensive Nutrition Assessment    Type and Reason for Visit:  Initial, Positive Nutrition Screen, Wound (new vent)    Nutrition Recommendations/Plan:   If tubefeedings started would recommend Vital 1.2 at 10 ml/hr, increase 10 ml/hr every 6 hours as tolerated to goal of 30 ml/hr.  Bolus 1 (2.5oz) bottle [id number] (protein modular) BID down OGT.  Additional free water flush per Provider.  TF regimen to provide~ 1460 kcals (864 TF, 208 protein modular, 388 diprivan), 106 grams protein (54 TF, 52 protein modulars), 80 grams CHO, 4 grams fiber, 584 mls water in  870 mls total volume (720 TF, 150 protein modular) per 24 hours.     Malnutrition Assessment:  Malnutrition Status:  Insufficient data (06/11/24 1220)    Context:  Acute Illness     Findings of the 6 clinical characteristics of malnutrition:  Energy Intake:   (unable to assess prior to admit as patient is intubated, NPO 6/10/24)  Weight Loss:  No significant weight loss (per EMR)     Body Fat Loss:  Mild body fat loss (unknown if nutrition related vs age related losses) Orbital, Fat Overlying Ribs   Muscle Mass Loss:  Mild muscle mass loss (unknown if nutrition related vs age related losses) Temples (temporalis), Clavicles (pectoralis & deltoids)  Fluid Accumulation:  No significant fluid accumulation     Strength:  Not Performed    Nutrition Assessment:     Pt. nutritionally compromised AEB NPO d/t intubation 6/10/24 .  At risk for further nutrition compromise r/t admit with acute hypoxic respiratory failure secondary to covid (6/10/24) pneumonia, septic shock,  and underlying medical condition (hx: COPD, CHF, depression, osteoporosis, GERD, hypothyroidism, chronic pain syndrome, pulmonary fibrosis, impaired fasting glucose, afib, CAD, iron deficiency anemia, SDH, anxiety).       Nutrition Related Findings:    Pt. Report/Treatments/Miscellaneous: Patient seen earlier this morning, intubated, diprivan running at 14.7 ml/hr.  No family at bedside. 
[facility]  INPATIENT PHYSICAL THERAPY  EVALUATION  [facility] CVICU 4B - 4B-06/006-A    Time In: 1400  Time Out: 1438  Timed Code Treatment Minutes: 23 Minutes  Minutes: 38        Date: 2024  Patient Name: Toshia South,  Gender:  female        MRN: [id number]  : 1945  (78 y.o.)      Referring Practitioner: Brad Foley DO  Diagnosis: COVID  Additional Pertinent Hx: Per EMR:All history provided by family at bedside and chart review.  Patient developed acute onset shortness of breath this morning about 3 AM.  Did some breathing treatments with no improvement and has been called EMS.  When patient arrived to emergency department, her oxygen saturation was in the 70s on room air.  Placed on NIPPV with no improvement, did show respiratory acidosis and patient was ultimately intubated.  COVID-19 positive.  CXR shows diffuse bilateral infiltrates.  Patient was transferred to [facility] for further evaluation care. Patient extubated on 24 and off sedation.     Restrictions/Precautions:  Restrictions/Precautions: Contact Precautions (Droplet-Plus)  Position Activity Restriction  Other position/activity restrictions: monitor 02    Subjective:  Chart Reviewed: Yes  Patient assessed for rehabilitation services?: Yes  Family / Caregiver Present: Yes  Subjective: RN approved session. Patient resting in bed with family present. Pt [place], but alert. Pt confused at times and very fidgety. Pt perseverative on keeping [place] in her mouth to manage secretions. Sp02 was 92% at rest, but would drop to mid 70s when patient would leave the [place] in her mouth for an extended period of time. Gerardo WONG reported patient had a large amount of sedation with intubation so this is likely still wearing off.     General:  Overall Orientation Status: Impaired (Pt confused at times throughout session asking therapist what she needs to be doing, decreased safety awareness trying to stand without wearing socks and 
[facility]  STRZ ICU STEPDOWN TELEMETRY 4K  Occupational Therapy  Daily Note  Time:   Time In: 754  Time Out: 08  Timed Code Treatment Minutes: 23 Minutes  Minutes: 23          Date: 2024  Patient Name: Toshia South,   Gender: female      Room: [facility]19/019-A  MRN: [id number]  : 1945  (78 y.o.)  Referring Practitioner: Brad Foley DO  Diagnosis: Covid  Additional Pertinent Hx: Patient developed acute onset shortness of breath this morning about 3 AM.  Did some breathing treatments with no improvement and has been called EMS.  When patient arrived to emergency department, her oxygen saturation was in the 70s on room air.  Placed on NIPPV with no improvement, did show respiratory acidosis and patient was ultimately intubated.  COVID-19 positive.  CXR shows diffuse bilateral infiltrates.  Patient was transferred to [facility] for further evaluation care. Acute hypoxic/hypercapnic respiratory failure: 2/2 COVID/MRSA-pneumonia; Septic shock 2/2 Combined COVID/bacterial-pneumonia. Intubated 6/10, extubated .    Restrictions/Precautions:  Restrictions/Precautions: Contact Precautions, Isolation, Up as Tolerated  Position Activity Restriction  Other position/activity restrictions: monitor 02, droplet+      Social/Functional History:  Lives With: Spouse  Type of Home: House  Home Layout: Two level, Able to Live on Main level with bedroom/bathroom  Home Access: Stairs to enter without rails  Entrance Stairs - Number of Steps: 1 step in through the garage, then 1 additional step up into the kitchen.  Home Equipment: Walker - Rolling, Cane   Bathroom Shower/Tub: Tub/Shower unit  Bathroom Equipment: Shower chair (Does not usually use shower chair.)       ADL Assistance: Independent  Homemaking Assistance: Independent  Homemaking Responsibilities: Yes  Ambulation Assistance: Independent  Transfer Assistance: Independent    Active : Yes (Patient does the driving,  reports he 
Patient arrived to unit from [facility] via EMS. Patient transferred to ICU bed and placed on continuous ICU bedside monitor. Patient admitted for COVID [U07.1]. Vitals obtained. See flowsheets. Patient's IV access includes see flowsheet. Current infusions and rates of infusion include Propofol @ 100 mcg/kg/min; Fentanyl @ 150 mcg/hr & Levophed @ 18.53 mcg/min. Assessment completed by MARVIN Medina. Two nurse skin assessment completed by Adam and radha. See flowsheets for assessment details. Policies and procedures of ICU unable to be explained to patient at this time. Family member(s)/representative(s) present at time of admission include none. Patient rights explained to family member(s)/representatives and patient, as able. Patient/patient's family member(s)/representative(s) N/A to have physician notified of their admission. All questions posed by patient's family member(s)/representative(s) and patient answered at this time.     1210 - Dr. Foley to see pt., POC discussed. Propofol to 90 mcg/kg/min  1220 - Decrease Propofol to 80 mcg/kg/min      
Patient was evaluated today for the diagnosis of COPD, Pulmonary fibrosis.  I entered a DME order for home oxygen at 1L at rest, 3 lpm with exertion because the diagnosis and testing require the patient to have supplemental oxygen.  Condition will improve or be benefited by oxygen use.  The patient is  able to perform good mobility in a home setting and therefore does require the use of a portable oxygen system.  The need for this equipment was discussed with the patient and she understands and is in agreement.   
[facility]   PROGRESS NOTE      Patient: Toshia South  Room #: 4B-06/006-A            YOB: 1945  Age: 78 y.o.  Gender: female            Admit Date & Time: 6/10/2024 11:40 AM    Situation:    This is a spiritual health encounter as a part of rounds. Patient, Toshia was laying in bed at the time of this visit. No visitors present    Assessment:  Patient shared about her hospitalization and her hopes for her next steps - to be moved off the unit would signal increased health for her. She shared about the support of her children and her spouse and expressed how she misses going on evening walks as se is currently hospitalized. Patient shared about her fawn and the way that prayer is important to her. She is not connected to a [employer] at this time.    Intervention:   provided empathic listening and reflection.  also provided prayer as well as information regarding  support and availability.    Plan:   team will remain available to support patient/family, PRN.         Electronically signed by Chaplain Sheila Cruz M.Div, on 6/13/2024 at 10:46 AM.     Spiritual Care Department  [place] [id number]  [phone]    
Pt has a good technique with acapella so it has been turned over to the pt.   
RN went over all discharge instructions with home with spouse. RN answered all questions with no further questions at this time. Patient discharged with all personal belongings, discharge instructions, and prescription medications. RN informed patient on making follow-up appointments with providers. Patient acknowledged RN. Patient discharged home with spouse and son.    
[facility]   Pharmacy Pharmacokinetic Monitoring Service - Vancomycin     Toshia South is a 78 y.o. female starting on vancomycin therapy for CAP. Pharmacy consulted by Dr Foley for monitoring and adjustment.    Target Concentration: Goal AUC/ROBERTO 400-600 mg*hr/L    Additional Antimicrobials: none    Pertinent Laboratory Values:   Wt Readings from Last 1 Encounters:   06/11/24 68.1 kg (150 lb 2.1 oz)     Temp Readings from Last 1 Encounters:   06/11/24 98 °F (36.7 °C) (Axillary)     Estimated Creatinine Clearance: 106 mL/min (based on SCr of 0.4 mg/dL).  Recent Labs     06/10/24  0558 06/10/24  1417 06/10/24  2023 06/11/24  0459   CREATININE 0.7   < > 0.5 0.4   BUN 16   < > 14 16   WBC 1.2*  --   --  7.2    < > = values in this interval not displayed.     Pertinent Cultures:  Date Source Results   6/10 SC Gm stain- many GPC   6/10 PNA PCR MRSA, gp B strep, Ecoli, K.aerogenes, K.pneumo, E.cloacae complex   MRSA Nasal Swab: showed MRSA positive result on 6/10    Plan:  Dosing recommendations based on Bayesian software  Start vancomycin 1750mg x1, followed by 1250mg q18h  Anticipated AUC of 507 and trough concentration of 12.5 at steady state  Renal labs as indicated   Vancomycin concentration ordered for 6/13 @ 0600   Pharmacy will continue to monitor patient and adjust therapy as indicated    Thank you for the consult,  Jessica Shirley, Pharm.D., BCPS, BCCCP 6/11/2024 9:27 AM        
[facility]   Pharmacy Pharmacokinetic Monitoring Service - Vancomycin    Indication: CAP  Target Concentration: Goal AUC/ROBERTO 400-600 mg*hr/L  Day of Therapy: 4  Additional Antimicrobials: Zosyn    Pertinent Laboratory Values:   Wt Readings from Last 1 Encounters:   06/14/24 71.2 kg (156 lb 15.5 oz)     Temp Readings from Last 1 Encounters:   06/14/24 97.9 °F (36.6 °C) (Oral)     Estimated Creatinine Clearance: 117 mL/min (based on SCr of 0.4 mg/dL).  Recent Labs     06/13/24  0320 06/14/24  0347   CREATININE 0.3* 0.4   BUN 21 26*   WBC 9.0 9.8     Pertinent Cultures:  Date Source Results   6/10/24 Blood X 2 NG 48 hours    6/10 SC Nl emely prelim, klebsiella pneumoniae (S) Amox/clav, Cefazolin (R) Ampicillin   6/10 PNA PCR MRSA, gp B strep, Ecoli, K.aerogenes, K.pneumo, E.cloacae complex    MRSA Nasal Swab: showed MRSA positive result on 6/10/24    Recent vancomycin administrations                     vancomycin (VANCOCIN) 1250 mg in sodium chloride 0.9% 250 mL IVPB (mg) 1,250 mg New Bag 06/13/24 2320     1,250 mg New Bag  1329    vancomycin (VANCOCIN) 1250 mg in sodium chloride 0.9% 250 mL IVPB (mg) 1,250 mg New Bag 06/12/24 2146     1,250 mg New Bag  0357    vancomycin (VANCOCIN) 1750 mg in sodium chloride 0.9 % 500 mL IVPB (mg) 1,750 mg New Bag 06/11/24 1023                  Assessment:  Date/Time Current Dose Concentration Timing of Concentration (h) AUC C trough,ss   6/14/2024 0347 1250 mg Q12H  14.7 4 h 27 m 335 6.3   Note: Serum concentrations collected for AUC dosing may appear elevated if collected in close proximity to the dose administered, this is not necessarily an indication of toxicity    Plan:  Current dosing regimen is sub-therapeutic  Increase dose to 1500 mg  Q12H   Repeat vancomycin concentration ordered for 6/15/2024 @ 0900   Pharmacy will continue to monitor patient and adjust therapy as indicated    Thank you for the consult,  Soni Rankin PharmD 6/14/2024 7:51 AM      
[facility]   Pharmacy Pharmacokinetic Monitoring Service - Vancomycin    Indication: CAP  Target Concentration: Goal AUC/ROBERTO 400-600 mg*hr/L  Day of Therapy: 3  Additional Antimicrobials: Zosyn    Pertinent Laboratory Values:   Wt Readings from Last 1 Encounters:   06/13/24 68.3 kg (150 lb 9.2 oz)     Temp Readings from Last 1 Encounters:   06/13/24 98 °F (36.7 °C) (Oral)     Estimated Creatinine Clearance: 141 mL/min (A) (based on SCr of 0.3 mg/dL (L)).  Recent Labs     06/12/24  0332 06/13/24  0320   CREATININE 0.4 0.3*   BUN 17 21   WBC 7.8 9.0     Pertinent Cultures:  Date Source Results   6/10/24 Blood X 2 NG 48 hours    6/10 SC Nl emely prelim, GS- many GPC singly and pairs, moderate GNB   6/10 PNA PCR MRSA, gp B strep, Ecoli, K.aerogenes, K.pneumo, E.cloacae complex     MRSA Nasal Swab: showed MRSA positive result on 6/10/24    Recent vancomycin administrations                     vancomycin (VANCOCIN) 1250 mg in sodium chloride 0.9% 250 mL IVPB (mg) 1,250 mg New Bag 06/12/24 2146     1,250 mg New Bag  0357    vancomycin (VANCOCIN) 1750 mg in sodium chloride 0.9 % 500 mL IVPB (mg) 1,750 mg New Bag 06/11/24 1023                    Assessment:  Date/Time Current Dose Concentration Timing of Concentration (h) AUC C trough,ss   6/13/2024 0349 1250 mg Q18H  8.5 6 h 3 m 240 3.5   Note: Serum concentrations collected for AUC dosing may appear elevated if collected in close proximity to the dose administered, this is not necessarily an indication of toxicity    Plan:  Current dosing regimen is sub-therapeutic- intermediate model fit MAP Bayesian.  Excluded sCr = 0.3 due to outside population studied  Increase dose to 1250 mg IV Q12H   Repeat vancomycin concentration ordered for 6/14 @ 0600   Pharmacy will continue to monitor patient and adjust therapy as indicated    Thank you for the consult,  Soni Rankin PharmD 6/13/2024 9:13 AM      
Transfer from [place] [facility],  covid pneumonia , came in 70's RA nowintubated, lac 2.8 temp 101.2 started Zith and Roceph K 3.3 GFR 89 WBC 1.2 last mo 3.7 Hgb 16 no blood gases yet as was waiting from 1 hour of intubation... trop13 EKG sinus tach no ST seg change  Hx Afib on Xarelto  Hx pulm fibrosis COPD hypothyroid CAD 92/40 Levo started 12 min ago, on Fent and Prop Dr Turner adm     
Pt is the main  as her spouse does not drive.    Restrictions/Precautions:  Restrictions/Precautions: Contact Precautions, Isolation, Up as Tolerated  Position Activity Restriction  Other position/activity restrictions: monitor 02, droplet+     SUBJECTIVE: RN approved session. Patient in recliner upon arrival and agreeable to therapy.     PAIN: denies    Vitals: Oxygen: on 2L nasal cannula, dropped into the 70s with activity, recovered 88-90% quickly with seated rest break    OBJECTIVE:  Bed Mobility:  Not Tested    Transfers:  Sit to Stand: Contact Guard Assistance  Stand to Sit:Contact Guard Assistance    Ambulation:  Contact Guard Assistance  Distance: 5ft forward/backward x2  Surface: Level Tile  Device:Rolling Walker  Gait Deviations:  Slow Helen, Decreased Step Length Bilaterally, Decreased Gait Speed, Decreased Heel Strike Bilaterally, and fairly steady, no LOB    Balance:  Dynamic Standing Balance: Contact Guard Assistance, completed LE standing exercises at RW, BUE support    Exercise:  Patient was guided in 1 set(s) 10 reps of exercise to both lower extremities.  Ankle pumps, Glut sets, Heelslides, Hip abduction/adduction, Straight leg raises, Seated marches, Long arc quads, Standing heel/toe raises, Standing marches, and Standing hip flexion.  Exercises were completed for increased independence with functional mobility.    Functional Outcome Measures:  [facility] (6 CLICK) BASIC MOBILITY     AM-PAC Inpatient Mobility without Stair Climbing Raw Score : 15  AM-PAC Inpatient without Stair Climbing T-Scale Score : 43.03  Mobility Inpatient CMS 0-100% Score: 47.43  Mobility Inpatient without Stair CMS G-Code Modifier : CK  Modified Aaliyah Scale:  Not Applicable    ASSESSMENT:  Assessment: Patient progressing toward established goals.  Activity Tolerance:  Patient tolerance of  treatment: good.      Equipment Recommendations:Equipment Needed: No  Discharge Recommendations: Continue to assess pending 
is the main  as her spouse does not drive.    Restrictions/Precautions:  Restrictions/Precautions: Contact Precautions, Isolation, Up as Tolerated  Position Activity Restriction  Other position/activity restrictions: monitor 02, droplet+     SUBJECTIVE: RN approved session, patient sitting up in bedside chair. On 4L oxygen and 02 sats remained in mid 90s at rest. Pt with less confusion this date and did recall this therapist from yesterday. Family present, but left for lunch at start of session.     PAIN: None    Vitals:   Vitals:    06/13/24 1300   BP: (!) 154/74   Pulse: 61   Resp: 19   Temp:    SpO2: 95%   02 sats remained in mid 90s except when walking, dropped to 87-88% on 4L.     OBJECTIVE:  Bed Mobility:  Scooting: Stand By Assistance, to scoot forward and back in sitting position.     Transfers:  Sit to Stand: Contact Guard Assistance, X 1, cues for hand placement, to/from chair with arms  Stand to Sit:Contact Guard Assistance, cues for hand placement    Ambulation:  Contact Guard Assistance, X 1  Distance: 4 feet forward and back x 2 reps  Surface: Level Tile  Device:Rolling Walker  Gait Deviations:  Slow Helen, Decreased Step Length Bilaterally, Decreased Gait Speed, Decreased Heel Strike Bilaterally, and decreased distance due to multiple lines and overall fatigue. Pt with drop in 02 with small walking distance.     Balance:  Static Standing Balance: Stand By Assistance, X 1, with RW. Patient tolerated static standing for about 5 minutes. Cues for pursed lip breathing due to sp02 in low 90s.     Exercise:  Patient was guided in 1 set(s) 10 reps of exercise to both lower extremities.  Ankle pumps, Glut sets, Quad sets, Heelslides, Hip abduction/adduction, Straight leg raises, and Standing marches.  Exercises were completed for increased independence with functional mobility.    Functional Outcome Measures:  [facility] (6 CLICK) BASIC MOBILITY     AM-PAC Inpatient Mobility without Stair Climbing Raw 
Signs/Symptoms Outcomes: Biochemical Data, Chewing or Swallowing, GI Status, Fluid Status or Edema, Nutrition Focused Physical Findings, Skin, Weight    Discharge Planning:    Too soon to determine     Allison C Schwab, RD, LD  Contact: [phone]     
reach back for chair required       AM-[facility] Inpatient Daily Activity Raw Score: 15  AM-PAC Inpatient ADL T-Scale Score : 34.69  ADL Inpatient CMS 0-100% Score: 56.46    Activity Tolerance:  Patient tolerance of  treatment: Fair treatment tolerance      Assessment:  Assessment: Toshia South is a 78 y.o.female that presents with above new performance deficits secondary to  covid-19. Pt is requiring increased assistance for ADLs, functional mobility, ADL transfers compared to baseline level of function. Skilled OT services is warranted to improve above performance deficits and progress pt towards PLOF. Without OT pt is at risk for falls, further decline in functional abilities, increased caregiver burden, increased risk for medical complication as a result of reduce mobility and inability to return to prior level of living.      Performance deficits / Impairments: Decreased functional mobility , Decreased ADL status, Decreased strength, Decreased safe awareness, Decreased endurance, Decreased balance, Decreased cognition, Decreased vision/visual deficit, Decreased high-level IADLs  Prognosis: Good  REQUIRES OT FOLLOW-UP: Yes  Decision Making: High Complexity    Treatment Initiated: Treatment and education initiated within context of evaluation.  Evaluation time included review of current medical information, gathering information related to past medical, social and functional history, completion of standardized testing, formal and informal observation of tasks, assessment of data and development of plan of care and goals.  Treatment time included skilled education and facilitation of tasks to increase safety and independence with ADL's for improved functional independence and quality of life.    Discharge Recommendations:  Continue to assess pending progress (pt would benefit form continued OT at [place]. Inpatient therapy stay vs. 24 hour supervision. TBD)    Patient Education:          Patient Education  Education 
the patient and coordination of care.  Time represents more than 50% of the time involved with patient care by the CC team.  Electronically signed by Brad Turner MD.     
  diagnosis would include pulmonary edema.      Nasogastric tube, well-positioned.      Endotracheal tube tip is 2.3 cm above the alejandro. Recommend 2 cm    retraction to ensure good positioning.      This document has been electronically signed by: Dylan Atkins MD on    06/11/2024 04:05 AM      XR ABDOMEN FOR NG/OG/NE TUBE PLACEMENT   Final Result   NG tube in good position.            **This report has been created using voice recognition software.  It may contain   minor errors which are inherent in voice recognition technology.**               Electronically signed by Dr. Philip Dennis        XR CHEST PORTABLE    Result Date: 6/11/2024  Chest one view COMPARISON: No prior FINDINGS: There are diffuse consolidating opacities in the right lung. There are mild left lung pulmonary opacities. The cardiac silhouette is within normal limits in size. The nasogastric tube courses below the level of the left hemidiaphragm. The endotracheal tube tip is 2.3 cm above the alejandro. A thoracic spinal stimulator device is noted.     Diffuse consolidating opacities in the right lung and mild left lung opacities. The findings are suspicious for pneumonia. The differential diagnosis would include pulmonary edema. Nasogastric tube, well-positioned. Endotracheal tube tip is 2.3 cm above the alejandro. Recommend 2 cm retraction to ensure good positioning. This document has been electronically signed by: Dylan Atkins MD on 06/11/2024 04:05 AM    XR ABDOMEN FOR NG/OG/NE TUBE PLACEMENT    Result Date: 6/10/2024  PROCEDURE: XR ABDOMEN FOR NG/OG/NE TUBE PLACEMENT CLINICAL INFORMATION: Confirmation of course of NG/OG/NE tube and location of tip of tube COMPARISON: No comparison available. TECHNIQUE: A single mobile x-ray of the abdomen was obtained to assess NG tube position. FINDINGS: The NG tube passes into the stomach. Neurostimulator leads project over the lower thoracic spine. Moderate infiltrates in both lung bases.     NG tube in good

## 2024-06-17 NOTE — RT PROTOCOL NOTE
Please advise no change in coumadin dose. INR in 2 weeks. Lab order faxed to [facility] for week of Feb 5th   RT Inhaler-Nebulizer Bronchodilator Protocol Note    There is a bronchodilator order in the chart from a provider indicating to follow the RT Bronchodilator Protocol and there is an “Initiate RT Inhaler-Nebulizer Bronchodilator Protocol” order as well (see protocol at bottom of note).    CXR Findings:  No results found.    The findings from the last RT Protocol Assessment were as follows:   History Pulmonary Disease: Chronic pulmonary disease  Respiratory Pattern: Regular pattern and RR 12-20 bpm  Breath Sounds: Slightly diminished and/or crackles  Cough: Strong, spontaneous, non-productive  Indication for Bronchodilator Therapy: Decreased or absent breath sounds, On home bronchodilators  Bronchodilator Assessment Score: 4    Aerosolized bronchodilator medication orders have been revised according to the RT Inhaler-Nebulizer Bronchodilator Protocol below.    Respiratory Therapist to perform RT Therapy Protocol Assessment initially then follow the protocol.  Repeat RT Therapy Protocol Assessment PRN for score 0-3 or on second treatment, BID, and PRN for scores above 3.    No Indications - adjust the frequency to every 6 hours PRN wheezing or bronchospasm, if no treatments needed after 48 hours then discontinue using Per Protocol order mode.     If indication present, adjust the RT bronchodilator orders based on the Bronchodilator Assessment Score as indicated below.  Use Inhaler orders unless patient has one or more of the following: on home nebulizer, not able to hold breath for 10 seconds, is not alert and oriented, cannot activate and use MDI correctly, or respiratory rate 25 breaths per minute or more, then use the equivalent nebulizer order(s) with same Frequency and PRN reasons based on the score.  If a patient is on this medication at home then do not decrease Frequency below that used at home.    0-3 - enter or revise RT bronchodilator order(s) to equivalent RT Bronchodilator order with Frequency of every 4

## 2024-06-17 NOTE — CARE COORDINATION
6/17/24, 8:47 AM EDT    Patient goals/plan/ treatment preferences discussed by  and .  Patient goals/plan/ treatment preferences reviewed with patient/ family.  Patient/ family verbalize understanding of discharge plan and are in agreement with goal/plan/treatment preferences.  Understanding was demonstrated using the teach back method.  AVS provided by RN at time of discharge, which includes all necessary medical information pertaining to the patients current course of illness, treatment, post-discharge goals of care, and treatment preferences.     Services At/After Discharge: Home Health [facility]    Patient to discharge today, home with spouse and new [facility].     SW notified [facility] care of discharge today.     Attending aware to place HH orders.     RN made aware.    Patient's address is [address] [place] [address].

## 2024-06-17 NOTE — PLAN OF CARE
Hospitalist Progress Note  Internal Medicine Resident      Patient: Toshia South 78 y.o. female      Unit/Bed: -06/006-A    Admit Date: 6/10/2024      Plan of care    Continuing with current management unless otherwise documented    Active Problems  Acute hypoxemic/hypercapnic respiratory failure-secondary ARDS with COVID and multi organism bacterial pneumonia: Intubated outside facility.  Extubated 6/12.  Sputum cultures collected 6/10 showed many segmented neutrophils, many gram-positive cocci occurring singly and in pairs, and rare budding yeast.  Culture showed gram-negative bacilli.  PCR from bronchoalveolar lavage on 6/10 showed E. coli, Enterobacter cloacae, Klebsiella aerogenes, Klebsiella pneumoniae, staph aureus (MECa +), and strep agalactiae.  PCR results highly suspicious for aspiration event.   -Will continue Decadron 20 mg for 5 days followed by 10 mg for 5 days treatment of ARDS   -Continuing Zosyn and vancomycin      Dysphagia-patient failed bedside swallow test after extubation: Modified barium swallow showed \" a Zenker's diverticulum in the upper esophagus. Oral,pharyngeal and esophageal structures otherwise appear normal. There is laryngeal penetration of thin and mildly thick barium with aspiration of thin barium.   -Speech following will provide additional recommendations   -Patient on adult dysphagia diet    Electronically signed by Raj Donaldson MD PGY1 IM resident on 6/13/2024 at 4:39 PM  Case was discussed with Attending, Dr. Song.    
  Problem: Chronic Conditions and Co-morbidities  Goal: Patient's chronic conditions and co-morbidity symptoms are monitored and maintained or improved  Outcome: Progressing  Flowsheets (Taken 6/12/2024 2020 by Erlinda Ann, RN)  Care Plan - Patient's Chronic Conditions and Co-Morbidity Symptoms are Monitored and Maintained or Improved:   Monitor and assess patient's chronic conditions and comorbid symptoms for stability, deterioration, or improvement   Collaborate with multidisciplinary team to address chronic and comorbid conditions and prevent exacerbation or deterioration   Update acute care plan with appropriate goals if chronic or comorbid symptoms are exacerbated and prevent overall improvement and discharge     Problem: Discharge Planning  Goal: Discharge to home or other facility with appropriate resources  Outcome: Progressing  Flowsheets (Taken 6/12/2024 2020 by Erlinda Ann, RN)  Discharge to home or other facility with appropriate resources:   Identify barriers to discharge with patient and caregiver   Arrange for needed discharge resources and transportation as appropriate   Identify discharge learning needs (meds, wound care, etc)  Note: Pt not appropriate for discharge at this time, will continue to monitor and reassess.        Problem: Safety - Adult  Goal: Free from fall injury  6/13/2024 1943 by Milagro Riley RN  Outcome: Progressing  Flowsheets (Taken 6/13/2024 1041 by Parvin Chance RN)  Free From Fall Injury: Instruct family/caregiver on patient safety  Note: Pt safety education reinforced.    6/13/2024 1041 by Parvin Chance RN  Outcome: Progressing  Flowsheets (Taken 6/13/2024 1041)  Free From Fall Injury: Instruct family/caregiver on patient safety     Problem: Respiratory - Adult  Goal: Achieves optimal ventilation and oxygenation  Outcome: Progressing  Flowsheets (Taken 6/12/2024 2020 by Erlinda Ann RN)  Achieves optimal ventilation and oxygenation:   Assess for changes in respiratory 
  Problem: Chronic Conditions and Co-morbidities  Goal: Patient's chronic conditions and co-morbidity symptoms are monitored and maintained or improved  Outcome: Progressing  Flowsheets (Taken 6/14/2024 1539)  Care Plan - Patient's Chronic Conditions and Co-Morbidity Symptoms are Monitored and Maintained or Improved:   Monitor and assess patient's chronic conditions and comorbid symptoms for stability, deterioration, or improvement   Collaborate with multidisciplinary team to address chronic and comorbid conditions and prevent exacerbation or deterioration   Update acute care plan with appropriate goals if chronic or comorbid symptoms are exacerbated and prevent overall improvement and discharge     Problem: Discharge Planning  Goal: Discharge to home or other facility with appropriate resources  Outcome: Progressing  Flowsheets (Taken 6/14/2024 1539)  Discharge to home or other facility with appropriate resources:   Identify barriers to discharge with patient and caregiver   Identify discharge learning needs (meds, wound care, etc)     Problem: Safety - Adult  Goal: Free from fall injury  Outcome: Progressing  Flowsheets (Taken 6/14/2024 1539)  Free From Fall Injury: Instruct family/caregiver on patient safety     
  Problem: Discharge Planning  Goal: Discharge to home or other facility with appropriate resources  Flowsheets (Taken 6/15/2024 1950)  Discharge to home or other facility with appropriate resources:   Identify barriers to discharge with patient and caregiver   Identify discharge learning needs (meds, wound care, etc)     Problem: Safety - Adult  Goal: Free from fall injury  Flowsheets (Taken 6/15/2024 1950)  Free From Fall Injury: Instruct family/caregiver on patient safety     Problem: Respiratory - Adult  Goal: Achieves optimal ventilation and oxygenation  Flowsheets (Taken 6/15/2024 1950)  Achieves optimal ventilation and oxygenation:   Assess for changes in respiratory status   Position to facilitate oxygenation and minimize respiratory effort   Assess for changes in mentation and behavior   Oxygen supplementation based on oxygen saturation or arterial blood gases   Encourage broncho-pulmonary hygiene including cough, deep breathe, incentive spirometry   Assess and instruct to report shortness of breath or any respiratory difficulty     Problem: Cardiovascular - Adult  Goal: Maintains optimal cardiac output and hemodynamic stability  Flowsheets (Taken 6/15/2024 1950)  Maintains optimal cardiac output and hemodynamic stability:   Monitor blood pressure and heart rate   Monitor urine output and notify Licensed Independent Practitioner for values outside of normal range   Assess for signs of decreased cardiac output     Problem: Cardiovascular - Adult  Goal: Absence of cardiac dysrhythmias or at baseline  Flowsheets (Taken 6/15/2024 1950)  Absence of cardiac dysrhythmias or at baseline:   Monitor cardiac rate and rhythm   Assess for signs of decreased cardiac output     Problem: Skin/Tissue Integrity - Adult  Goal: Skin integrity remains intact  Flowsheets (Taken 6/15/2024 1950)  Skin Integrity Remains Intact: Monitor for areas of redness and/or skin breakdown     Problem: Musculoskeletal - Adult  Goal: Return 
  Problem: Discharge Planning  Goal: Discharge to home or other facility with appropriate resources  Outcome: Progressing  See SW note  
  Problem: Respiratory - Adult  Goal: Achieves optimal ventilation and oxygenation  6/11/2024 0630 by Kathleen Mays RCP  Outcome: Progressing                                                  Patient Weaning Progress    The patient's vent settings was able to be weaned this shift.      Ventilator settings that were weaned              [] Mode   [x] Pressure support weaned   [x] Fio2 weaned   [x] Peep weaned      Spontaneous weaning trial  was not attempted.  Due to defined parameters for SBT (spontaneous breathing trial) not being met.     *Results of SBT documented under SBTOUTCOME note.    Reason that defined ventilator parameters for SBT was not met              [] Patient condition requires increased ventilator settings  [] Requires increased sedation   [] Settings not within weaning range   [] SAT not completed   [] Physician orders    Evac tube was not  hooked up with continuous low suction(20-30mmHg)      Cuff was not  deflated to determine cuff leak.     Unable to get agreement for goals because no family is present and patient cannot respond.     
  Problem: Respiratory - Adult  Goal: Achieves optimal ventilation and oxygenation  6/11/2024 1317 by Alley Hallman, P  Outcome: Progressing                                                  Patient Weaning Progress    The patient's vent settings was able to be weaned this shift.      Ventilator settings that were weaned              [x] Mode   [] Pressure support weaned   [] Fio2 weaned   [] Peep weaned      Spontaneous weaning trial  was attempted.     due to defined parameters for SBT (spontaneous breathing trial) not being met.     *Results of SBT documented under SBTOUTCOME note.    Reason that defined ventilator parameters for SBT was not met              [] Patient condition requires increased ventilator settings  [] Requires increased sedation   [] Settings not within weaning range   [] SAT not completed   [] Physician orders      Evac tube was  hooked up with continuous low suction(20-30mmHg)      Cuff was not  deflated to determine cuff leak.     Unable to get agreement for goals because no family is present and patient cannot respond.     
  Problem: Respiratory - Adult  Goal: Achieves optimal ventilation and oxygenation  6/12/2024 0516 by Kathleen Mays RCP  Outcome: Progressing                                                  Patient Weaning Progress    The patient's vent settings was not able to be weaned this shift.      Ventilator settings that were weaned              [] Mode   [] Pressure support weaned   [] Fio2 weaned   [] Peep weaned      Spontaneous weaning trial  was attempted.     *Results of SBT documented under SBTOUTCOME note.    Reason that defined ventilator parameters for SBT was not met              [] Patient condition requires increased ventilator settings  [] Requires increased sedation   [] Settings not within weaning range   [] SAT not completed   [] Physician orders    The patient was able to tolerate SBT throughout the night.  RSBI  was 22 with SpO2 of 95 on 40% FiO2.  Spontanteous VT was 592 and RR 13 breaths/min.      Evac tube was not  hooked up with continuous low suction(20-30mmHg)      Cuff was  deflated to determine cuff leak. A leak  was detected.    Unable to get agreement for goals because no family is present and patient cannot respond.     
  Problem: Respiratory - Adult  Goal: Achieves optimal ventilation and oxygenation  Outcome: Progressing  Flowsheets (Taken 6/10/2024 1173)  Achieves optimal ventilation and oxygenation:   Assess for changes in respiratory status   Assess for changes in mentation and behavior   Position to facilitate oxygenation and minimize respiratory effort  Note: Maintaining oxygen saturation on current vent settings.      Problem: Cardiovascular - Adult  Goal: Maintains optimal cardiac output and hemodynamic stability  Outcome: Progressing  Note: Blood pressure currently stable on levophed gtt.      
  Problem: Respiratory - Adult  Goal: Achieves optimal ventilation and oxygenation  Outcome: Progressing  Flowsheets (Taken 6/10/2024 1333)  Achieves optimal ventilation and oxygenation:   Assess for changes in respiratory status   Assess for changes in mentation and behavior   Position to facilitate oxygenation and minimize respiratory effort  Note: Maintaining oxygen saturation on current vent settings.      Problem: Cardiovascular - Adult  Goal: Maintains optimal cardiac output and hemodynamic stability  Outcome: Progressing  Note: Blood pressure currently stable on levophed gtt.      Problem: Safety - Medical Restraint  Goal: Remains free of injury from restraints (Restraint for Interference with Medical Device)  Description: INTERVENTIONS:  1. Determine that other, less restrictive measures have been tried or would not be effective before applying the restraint  2. Evaluate the patient's condition at the time of restraint application  3. Inform patient/family regarding the reason for restraint  4. Q2H: Monitor safety, psychosocial status, comfort, nutrition and hydration  Outcome: Progressing  Flowsheets (Taken 6/10/2024 7272)  Remains free of injury from restraints (restraint for interference with medical device):   Determine that other, less restrictive measures have been tried or would not be effective before applying the restraint   Evaluate the patient's condition at the time of restraint application   Every 2 hours: Monitor safety, psychosocial status, comfort, nutrition and hydration   Inform patient/family regarding the reason for restraint     
  Problem: Respiratory - Adult  Goal: Clear lung sounds  Outcome: Progressing  Note: Mdi to maintain open airways. Patient mutually agreed on goals.       
  Problem: Respiratory - Adult  Goal: Clear lung sounds  Outcome: Progressing  Note: Mdi to maintain open airways. Patient mutually agreed on goals.       
  Problem: Respiratory - Adult  Goal: Clear lung sounds  Outcome: Progressing  Note: Patient agrees to goals     
  Problem: Safety - Adult  Goal: Free from fall injury  6/13/2024 1041 by Parvin Chance, RN  Outcome: Progressing  Flowsheets (Taken 6/13/2024 1041)  Free From Fall Injury: Instruct family/caregiver on patient safety     Problem: Pain  Goal: Verbalizes/displays adequate comfort level or baseline comfort level  6/13/2024 1041 by Parvin Chance, RN  Outcome: Progressing  Flowsheets (Taken 6/13/2024 0800)  Verbalizes/displays adequate comfort level or baseline comfort level:   Encourage patient to monitor pain and request assistance   Assess pain using appropriate pain scale   Administer analgesics based on type and severity of pain and evaluate response     
Identify discharge learning needs (meds, wound care, etc)   Refer to discharge planning if patient needs post-hospital services based on physician order or complex needs related to functional status, cognitive ability or social support system     Problem: Safety - Adult  Goal: Free from fall injury  6/11/2024 1007 by Mimi Martinez RN  Outcome: Progressing  Flowsheets (Taken 6/11/2024 1007)  Free From Fall Injury: Instruct family/caregiver on patient safety  6/10/2024 2054 by Nathan Yun RN  Outcome: Progressing  Flowsheets (Taken 6/10/2024 2000)  Free From Fall Injury:   Instruct family/caregiver on patient safety   Based on caregiver fall risk screen, instruct family/caregiver to ask for assistance with transferring infant if caregiver noted to have fall risk factors     Problem: Respiratory - Adult  Goal: Achieves optimal ventilation and oxygenation  6/11/2024 1007 by Mimi Martinez RN  Outcome: Progressing  Flowsheets (Taken 6/11/2024 1007)  Achieves optimal ventilation and oxygenation:   Assess for changes in respiratory status   Assess for changes in mentation and behavior   Position to facilitate oxygenation and minimize respiratory effort   Oxygen supplementation based on oxygen saturation or arterial blood gases   Assess and instruct to report shortness of breath or any respiratory difficulty   Assess the need for suctioning and aspirate as needed  6/11/2024 0630 by Kathleen Mays RCP  Outcome: Progressing  6/10/2024 2054 by Nathan Yun RN  Outcome: Progressing  Flowsheets  Taken 6/10/2024 2000 by Nathan Yun RN  Achieves optimal ventilation and oxygenation: Assess for changes in respiratory status  Taken 6/10/2024 1506 by Kristie Dodson RCP  Achieves optimal ventilation and oxygenation: Respiratory therapy support as indicated     Problem: Cardiovascular - Adult  Goal: Maintains optimal cardiac output and hemodynamic stability  6/11/2024 1007 by Mimi Martniez RN  Outcome: 
MARVIN Le  Outcome: Progressing  Flowsheets (Taken 6/11/2024 1007)  Maintains optimal cardiac output and hemodynamic stability:   Monitor blood pressure and heart rate   Monitor urine output and notify Licensed Independent Practitioner for values outside of normal range   Assess for signs of decreased cardiac output   Administer vasoactive medications as ordered  Goal: Absence of cardiac dysrhythmias or at baseline  6/11/2024 2216 by Nathan Yun RN  Outcome: Progressing  Flowsheets (Taken 6/11/2024 2000)  Absence of cardiac dysrhythmias or at baseline: Monitor cardiac rate and rhythm  6/11/2024 1007 by Mimi Martinez RN  Outcome: Progressing  Flowsheets (Taken 6/11/2024 1007)  Absence of cardiac dysrhythmias or at baseline:   Monitor cardiac rate and rhythm   Assess for signs of decreased cardiac output     Problem: Skin/Tissue Integrity - Adult  Goal: Skin integrity remains intact  6/11/2024 2216 by Nathan Yun RN  Outcome: Progressing  Flowsheets (Taken 6/11/2024 2000)  Skin Integrity Remains Intact: Monitor for areas of redness and/or skin breakdown  6/11/2024 1007 by Mimi Martinez RN  Outcome: Progressing  Flowsheets (Taken 6/11/2024 1007)  Skin Integrity Remains Intact:   Monitor for areas of redness and/or skin breakdown   Assess vascular access sites hourly     Problem: Musculoskeletal - Adult  Goal: Return mobility to safest level of function  6/11/2024 2216 by Nathan Yun RN  Outcome: Progressing  Flowsheets (Taken 6/11/2024 2000)  Return Mobility to Safest Level of Function: Assess patient stability and activity tolerance for standing, transferring and ambulating with or without assistive devices  6/11/2024 1007 by Mimi Martinez RN  Outcome: Progressing  Flowsheets (Taken 6/11/2024 1007)  Return Mobility to Safest Level of Function:   Assess patient stability and activity tolerance for standing, transferring and ambulating with or without assistive devices   Obtain physical 
Progressing  Flowsheets (Taken 6/10/2024 2000)  Urinary catheter remains patent: Assess patency of urinary catheter     Problem: Infection - Adult  Goal: Absence of infection at discharge  Outcome: Progressing  Flowsheets (Taken 6/10/2024 2000)  Absence of infection at discharge: Assess and monitor for signs and symptoms of infection     Problem: Metabolic/Fluid and Electrolytes - Adult  Goal: Electrolytes maintained within normal limits  Outcome: Progressing  Flowsheets (Taken 6/10/2024 2000)  Electrolytes maintained within normal limits: Monitor labs and assess patient for signs and symptoms of electrolyte imbalances  Goal: Hemodynamic stability and optimal renal function maintained  Outcome: Progressing  Flowsheets (Taken 6/10/2024 2000)  Hemodynamic stability and optimal renal function maintained: Monitor labs and assess for signs and symptoms of volume excess or deficit     Problem: Hematologic - Adult  Goal: Maintains hematologic stability  Outcome: Progressing  Flowsheets (Taken 6/10/2024 2000)  Maintains hematologic stability: Assess for signs and symptoms of bleeding or hemorrhage     Problem: Safety - Medical Restraint  Goal: Remains free of injury from restraints (Restraint for Interference with Medical Device)  Description: INTERVENTIONS:  1. Determine that other, less restrictive measures have been tried or would not be effective before applying the restraint  2. Evaluate the patient's condition at the time of restraint application  3. Inform patient/family regarding the reason for restraint  4. Q2H: Monitor safety, psychosocial status, comfort, nutrition and hydration  6/10/2024 2054 by Nathan Yun RN  Outcome: Progressing  Flowsheets  Taken 6/10/2024 2000 by Nathan Yun RN  Remains free of injury from restraints (restraint for interference with medical device):   Determine that other, less restrictive measures have been tried or would not be effective before applying the restraint   Evaluate 
during mobilization   Apply continuous passive motion per provider or physical therapy orders to increase flexion toward goal   Instruct patient/family in ordered activity level     Problem: Gastrointestinal - Adult  Goal: Maintains or returns to baseline bowel function  6/16/2024 1136 by Jan Hall RN  Outcome: Progressing  Flowsheets (Taken 6/16/2024 1136)  Maintains or returns to baseline bowel function:   Administer IV fluids as ordered to ensure adequate hydration   Assess bowel function   Encourage mobilization and activity   Encourage oral fluids to ensure adequate hydration   Administer ordered medications as needed   Nutrition consult to assist patient with appropriate food choices     Problem: Genitourinary - Adult  Goal: Urinary catheter remains patent  6/16/2024 1136 by Jan Hall RN  Outcome: Progressing  Flowsheets (Taken 6/16/2024 1136)  Urinary catheter remains patent: Assess patency of urinary catheter     Problem: Infection - Adult  Goal: Absence of infection at discharge  6/16/2024 1136 by Jan Hall RN  Outcome: Progressing  Flowsheets (Taken 6/16/2024 1136)  Absence of infection at discharge:   Assess and monitor for signs and symptoms of infection   Monitor lab/diagnostic results   Monitor all insertion sites i.e., indwelling lines, tubes and drains   Administer medications as ordered   Instruct and encourage patient and family to use good hand hygiene technique   Identify and instruct in appropriate isolation precautions for identified infection/condition     Problem: Metabolic/Fluid and Electrolytes - Adult  Goal: Electrolytes maintained within normal limits  6/16/2024 1136 by Jan Hall RN  Outcome: Progressing  Flowsheets (Taken 6/16/2024 1136)  Electrolytes maintained within normal limits:   Monitor response to electrolyte replacements, including repeat lab results as appropriate   Monitor labs and assess patient for signs and symptoms of electrolyte 
pain management  
symptoms of infection   Monitor lab/diagnostic results   Monitor all insertion sites i.e., indwelling lines, tubes and drains   [place] appropriate cooling/warming therapies per order   Administer medications as ordered     Problem: Metabolic/Fluid and Electrolytes - Adult  Goal: Electrolytes maintained within normal limits  Outcome: Progressing  Flowsheets (Taken 6/12/2024 2020)  Electrolytes maintained within normal limits:   Monitor labs and assess patient for signs and symptoms of electrolyte imbalances   Administer electrolyte replacement as ordered   Monitor response to electrolyte replacements, including repeat lab results as appropriate   Fluid restriction as ordered  Goal: Hemodynamic stability and optimal renal function maintained  Outcome: Progressing  Flowsheets (Taken 6/12/2024 2020)  Hemodynamic stability and optimal renal function maintained:   Monitor labs and assess for signs and symptoms of volume excess or deficit   Monitor intake, output and patient weight   Monitor urine specific gravity, serum osmolarity and serum sodium as indicated or ordered   Monitor response to interventions for patient's volume status, including labs, urine output, blood pressure (other measures as available)     Problem: Hematologic - Adult  Goal: Maintains hematologic stability  Outcome: Progressing  Flowsheets (Taken 6/12/2024 2020)  Maintains hematologic stability:   Assess for signs and symptoms of bleeding or hemorrhage   Monitor labs for bleeding or clotting disorders   Administer blood products/factors as ordered     Problem: Safety - Medical Restraint  Goal: Remains free of injury from restraints (Restraint for Interference with Medical Device)  Description: INTERVENTIONS:  1. Determine that other, less restrictive measures have been tried or would not be effective before applying the restraint  2. Evaluate the patient's condition at the time of restraint application  3. Inform patient/family regarding the

## 2024-06-17 NOTE — CARE COORDINATION
6/17/24, 12:22 PM EDT    Patient goals/plan/ treatment preferences discussed by  and .  Patient goals/plan/ treatment preferences reviewed with patient/ family.  Patient/ family verbalize understanding of discharge plan and are in agreement with goal/plan/treatment preferences.  Understanding was demonstrated using the teach back method.  AVS provided by RN at time of discharge, which includes all necessary medical information pertaining to the patients current course of illness, treatment, post-discharge goals of care, and treatment preferences.     Services At/After Discharge: DME, Home Health, Nursing service, OT, and PT  Plans home w spouse Jerrell, [facility], Medical Services Oxygen 1L rest, 3L w activity (change from prior), family will bring oxygen tank; collaborated w MARVIN Lindquist, patient, PADMINI [place], Medical Services, Attending

## 2024-06-17 NOTE — CARE COORDINATION
DISCHARGE PLANNING EVALUATION  6/17/24, 8:45 AM EDT    Reason for Referral: Discharge planning  Decision Maker: Self  Current Services: None  New Services Requested: [facility] ([place])  Family/ Social/ Home environment: Patient lives with her spouse in their home. She reported that she has good support from family and friends.   Payment Source: Medicare  Transportation at Discharge: Family  Post-acute (PAC) provider list was provided to patient. Patient was informed of their freedom to choose PAC provider. Discussed and offered to show the patient the relevant PAC Providers quality and resource use measures on Medicare Compare web site via computer based on patient's goals of care and treatment preferences. Questions regarding selection process were answered.      Teach Back Method used with Patient regarding care plan and needs.  Patient verbalized understanding of the plan of care and contribute to goal setting.       Patient preferences and discharge plan: Patient plans to return home with her spouse and new [facility] out of [place]. Denied further needs and reported that her spouse will be her O2 from home for transporting her back home.    SW made referral to [facility] and they can accept.    Electronically signed by BUZZ Schmitz on 6/17/2024 at 8:45 AM

## 2024-06-18 ENCOUNTER — CARE COORDINATION (OUTPATIENT)
Dept: CASE MANAGEMENT | Age: 79
End: 2024-06-18

## 2024-06-18 NOTE — CARE COORDINATION
tomorrow and ask.  Will have HH but ST was not ordered.  Encouraged to ask about adding ST to order.  Explained that ST can determine when thickening liquids could stop.  Voiced understanding.  No other issues to report.  Denies any other needs.  No other questions or concerns at this time.  Will continue to follow.  Spoke with Massiel at [facility], said that the med will not be in today and if need transferred, [facility] would have to call.  Spoke with Yariel at [facility], said they do not have it in stock and will not be able to order.  Spoke with Candelario and explained about med, said he will not pick that med up, too far to drive.  He will inform PCP tomorrow.  Spoke again with Massiel, according to notes, the provider canceled the med since it was only for 3 doses.      Care Transition Nurse reviewed discharge instructions, medical action plan, and red flags with patient and spouse/partner. The patient and spouse/partner was given an opportunity to ask questions; all questions answered at this time.. The patient and spouse/partner verbalized understanding.   Were discharge instructions available to patient? Yes.   Reviewed appropriate site of care based on symptoms and resources available to patient including: PCP  Specialist  Urgent care clinics  [facility]  When to call 911. The patient and spouse/partner agrees to contact the primary care provider and/or specialist office for questions related to their healthcare.      Advance Care Planning:   Does patient have an Advance Directive: patient declined education.    Medication Reconciliation:  Medication reconciliation was performed with patient and spouse/partner,[id number] entered: yes.   Patient reminded to bring all medication bottles to follow up appointment with PCP.    Remote Patient Monitoring:  Offered patient enrollment in the Remote Patient Monitoring (RPM) program for in-home monitoring: Patient is not eligible for RPM program because: affiliate

## 2024-06-21 ENCOUNTER — CARE COORDINATION (OUTPATIENT)
Dept: CASE MANAGEMENT | Age: 79
End: 2024-06-21

## 2024-06-21 NOTE — CARE COORDINATION
Care Transitions Note    Follow Up Call     Patient Current Location:  Home:  [address]  [place] [address]    Care Transition Nurse contacted the spouse/partner  by telephone. Verified name and  as identifiers.    Additional needs identified to be addressed with provider   No needs identified                 Method of communication with provider: none.    Care Summary Note:  Spoke with , Candelario, said Sary continues to do well.  Is talking to  nurse on other phone.  Denies fever, chills, chest pain, dyspnea, cough.  O2 continues as directed.  Saw PCP this week and ST was ordered.  Eating, drinking, sleeping good.  No issues with B&B.  No other issues to report.  Denies any other needs.  No other questions or concerns at this time.  Will continue to follow.    Plan of care updates since last contact:  Home Health: ST ordered        Advance Care Planning:   Does patient have an Advance Directive: reviewed during previous call, see note. .    Medication Review:  Full medication reconciliation completed during previous call. and No changes since last call.     Remote Patient Monitoring:  Offered patient enrollment in the Remote Patient Monitoring (RPM) program for in-home monitoring: Patient is not eligible for RPM program because: affiliate provider.    Assessments:  Care Transitions Subsequent and Final Call    Subsequent and Final Calls  Do you have any ongoing symptoms?: No  Have your medications changed?: No  Do you have any questions related to your medications?: No  Do you currently have any active services?: Yes  Are you currently active with any services?: Home Health  Do you have any needs or concerns that I can assist you with?: No  Identified Barriers: Lack of Education  Care Transitions Interventions     Transportation Support: Declined   Other Interventions:              Follow Up Appointment:   Reviewed upcoming appointment(s). and AMIE appointment attended as scheduled   Future Appointments

## 2024-06-26 ENCOUNTER — CARE COORDINATION (OUTPATIENT)
Dept: CASE MANAGEMENT | Age: 79
End: 2024-06-26

## 2024-06-26 NOTE — CARE COORDINATION
Care Transitions Note    Follow Up Call     Patient Current Location:  Home: [address]  [place] [address]    Care Transition Nurse contacted the patient by telephone. Verified name and  as identifiers.    Additional needs identified to be addressed with provider   No needs identified                 Method of communication with provider: none.    Care Summary Note: CTN call to Toshia today and she says she is feeling better. Denies inc. Sob, chest pain/tightness, dizziness, fever, chills, malaise, swelling, n/v/d, choking.  Still on thick liquids. Speech Therapy coming 2x this week. She is doing the exercises they gave her. Unsure how much longer she will be on thick liquids.  O2 1-3l/min cont nc pO2=>90%  IS=using but difficult for her to do.  Wt.=138 GN=608/65  24 sees  @ [facility]  Pulm 24   will take her.  Eating, drinking & sleeping well. Denies problems w/ urination/bowels.  Slowly regaining strength.  assists w/ activity & ADLs.  No other concerns voiced at this time. Will continue to follow.    Plan of care updates since last contact:  Review of patient management of conditions/medications: PNA Covid+ sepstic shock dysphagia, PAF, CHF, COPD       Advance Care Planning:   Does patient have an Advance Directive: reviewed during previous call, see note. .    Medication Review:  No changes since last call.     Remote Patient Monitoring:  Offered patient enrollment in the Remote Patient Monitoring (RPM) program for in-home monitoring: Yes, but did not enroll at this time: declined to enroll in the program becausesee previous note .    Assessments:  Care Transitions ED Follow Up    Care Transitions Interventions     Other Therapy Services: Completed    Home Care Waiver: Completed Physical Therapy: Completed       Transportation Support: Declined      DME Assistance: Declined   Disease Specific Clinic: Completed Occupational Therapy: Completed     Disease Association: Completed

## 2024-07-01 ENCOUNTER — OFFICE VISIT (OUTPATIENT)
Dept: PULMONOLOGY | Age: 79
End: 2024-07-01
Payer: MEDICARE

## 2024-07-01 VITALS
BODY MASS INDEX: 22.99 KG/M2 | HEART RATE: 92 BPM | RESPIRATION RATE: 16 BRPM | OXYGEN SATURATION: 87 % | HEIGHT: 65 IN | SYSTOLIC BLOOD PRESSURE: 101 MMHG | WEIGHT: 138 LBS | TEMPERATURE: 97 F | DIASTOLIC BLOOD PRESSURE: 71 MMHG

## 2024-07-01 DIAGNOSIS — J84.10 PULMONARY FIBROSIS (HCC): ICD-10-CM

## 2024-07-01 DIAGNOSIS — I48.0 PAF (PAROXYSMAL ATRIAL FIBRILLATION) (HCC): ICD-10-CM

## 2024-07-01 DIAGNOSIS — Z86.16 HISTORY OF COVID-19: ICD-10-CM

## 2024-07-01 DIAGNOSIS — K44.9 HIATAL HERNIA: ICD-10-CM

## 2024-07-01 DIAGNOSIS — J96.11 CHRONIC HYPOXEMIC RESPIRATORY FAILURE (HCC): ICD-10-CM

## 2024-07-01 DIAGNOSIS — K21.9 GASTROESOPHAGEAL REFLUX DISEASE, UNSPECIFIED WHETHER ESOPHAGITIS PRESENT: ICD-10-CM

## 2024-07-01 DIAGNOSIS — J47.9 BRONCHIECTASIS WITHOUT COMPLICATION (HCC): Primary | ICD-10-CM

## 2024-07-01 PROCEDURE — 1036F TOBACCO NON-USER: CPT | Performed by: INTERNAL MEDICINE

## 2024-07-01 PROCEDURE — 1111F DSCHRG MED/CURRENT MED MERGE: CPT | Performed by: INTERNAL MEDICINE

## 2024-07-01 PROCEDURE — G8420 CALC BMI NORM PARAMETERS: HCPCS | Performed by: INTERNAL MEDICINE

## 2024-07-01 PROCEDURE — 99214 OFFICE O/P EST MOD 30 MIN: CPT | Performed by: INTERNAL MEDICINE

## 2024-07-01 PROCEDURE — G8427 DOCREV CUR MEDS BY ELIG CLIN: HCPCS | Performed by: INTERNAL MEDICINE

## 2024-07-01 PROCEDURE — G8399 PT W/DXA RESULTS DOCUMENT: HCPCS | Performed by: INTERNAL MEDICINE

## 2024-07-01 PROCEDURE — 1123F ACP DISCUSS/DSCN MKR DOCD: CPT | Performed by: INTERNAL MEDICINE

## 2024-07-01 PROCEDURE — 1090F PRES/ABSN URINE INCON ASSESS: CPT | Performed by: INTERNAL MEDICINE

## 2024-07-01 RX ORDER — UMECLIDINIUM BROMIDE AND VILANTEROL TRIFENATATE 62.5; 25 UG/1; UG/1
1 POWDER RESPIRATORY (INHALATION) DAILY
Qty: 3 EACH | Refills: 2 | Status: SHIPPED | OUTPATIENT
Start: 2024-07-01

## 2024-07-01 RX ORDER — UMECLIDINIUM BROMIDE AND VILANTEROL TRIFENATATE 62.5; 25 UG/1; UG/1
1 POWDER RESPIRATORY (INHALATION) DAILY
Qty: 1 EACH | Refills: 5 | Status: SHIPPED | OUTPATIENT
Start: 2024-07-01 | End: 2024-07-01 | Stop reason: ALTCHOICE

## 2024-07-01 NOTE — PROGRESS NOTES
PULMONARY OP  PROGRESS NOTE      Patient:  Toshia South  YOB: 1945    MRN: [id number]     Acct: [id number]       Pt seen and Chart reviewed.  Toshia South is here in followup for   1. Bronchiectasis without complication (HCC)    2. Hiatal hernia    3. Gastroesophageal reflux disease, unspecified whether esophagitis present    4. Pulmonary fibrosis (HCC)    5. History of COVID-19    6. Chronic hypoxemic respiratory failure (HCC)    7. PAF (paroxysmal atrial fibrillation) ([facility])          Patient has been hospitalized for COVID-19 infection and bacterial pneumonia causing ARDS, since last visit, in June 2024  Has been using meds as recommended.  Denied much of cough or sputum production  Denied much of shortness of breath or wheezing  No chest pain or pressure  Her appetite has improved  No fever or chills or night sweats  Not coughing up any blood  No acid reflux symptoms  No orthopnea, PND or increasing pedal edema  On supplemental oxygen at 3 L/min via nasal cannula  No bleeding complications related to oxygen use    Subjective:  Review of Systems    Review of Systems -   General ROS: Completed and except as mentioned above were negative   Psychological ROS:  Completed and except as mentioned above were negative  Ophthalmic ROS:  Completed and except as mentioned above were negative  ENT ROS:  Completed and except as mentioned above were negative  Allergy and Immunology ROS:  Completed and except as mentioned above werenegative  Hematological and Lymphatic ROS:  Completed and except as mentioned above were negative  Endocrine ROS: Completed and except as mentioned above were negative  Respiratory ROS:  Completed and except asmentioned above were negative  Cardiovascular ROS:  Completed and except as mentioned above were negative  Gastrointestinal ROS: Completed and except as mentioned above were negative  Genito-Urinary ROS:  Completedand except as mentioned above were

## 2024-07-02 ENCOUNTER — CARE COORDINATION (OUTPATIENT)
Dept: CASE MANAGEMENT | Age: 79
End: 2024-07-02

## 2024-07-02 NOTE — CARE COORDINATION
Care Transitions Note    Follow Up Call     Patient Current Location:  Home: [address]  [place] [address]    Care Transition Nurse contacted the spouse/partner  by telephone. Verified name and  as identifiers.    Additional needs identified to be addressed with provider   No needs identified                 Method of communication with provider: none.    Care Summary Note:  Spoke with , Candelario, said Sary is doing well.  Taking a nap right now.   nurse was there today and released her.  BP, wt stable.  ST will continue to follow, still on thickened liquids.  Denies chest pain, dyspnea, fever, chills, dizziness, edema.  Continues with O2 as directed.  Saw pulmonary yesterday, still has a little bit of a cough but said that may persist for a few months.  No other issues to report.  Denies any other needs.  No other questions or concerns at this time.  Will continue to follow for 1 more call if stable.    Plan of care updates since last contact:  Home Health:  RN released her, will still have ST        Advance Care Planning:   Does patient have an Advance Directive: reviewed during previous call, see note. .    Medication Review:  No changes since last call.     Remote Patient Monitoring:  Offered patient enrollment in the Remote Patient Monitoring (RPM) program for in-home monitoring: Patient is not eligible for RPM program because: affiliate provider.    Assessments:  Care Transitions Subsequent and Final Call    Subsequent and Final Calls  Do you have any ongoing symptoms?: No  Have your medications changed?: No  Do you have any questions related to your medications?: No  Do you currently have any active services?: Yes  Are you currently active with any services?: Home Health  Do you have any needs or concerns that I can assist you with?: No  Identified Barriers: Lack of Education  Care Transitions Interventions     Other Therapy Services: Completed    Home Care Waiver: Completed Physical Therapy:

## 2024-07-10 ENCOUNTER — CARE COORDINATION (OUTPATIENT)
Dept: CASE MANAGEMENT | Age: 79
End: 2024-07-10

## 2024-07-10 NOTE — CARE COORDINATION
Care Transitions Note    Final Call     Patient Current Location:  Home: [address] [address]    Care Transition Nurse contacted the spouse/partner  by telephone. Verified name and  as identifiers.    Patient graduated from the Care Transitions program on 7/10/24.  Patient/family verbalizes confidence in the ability to self-manage at this time. has the ability to self manage at this time..      Advance Care Planning:   Does patient have an Advance Directive: reviewed during previous call, see note. .    Handoff:   Patient was not referred to the ACM team due to no additional needs identified.       Care Summary Note:  Spoke with , Candelario, said Sary is doing very well.  BP, wt stable.  Denies chest pain, dyspnea, fever, chills, dizziness, swelling.  Eating, drinking, sleeping good.  Will have swallow test next week, hoping won't have to have thickened liquids.  Denies any dysphagia.  No other issues to report.  Denies any other needs.  No other questions or concerns at this time.  Final call, appreciative.     Assessments:  Care Transitions Subsequent and Final Call    Subsequent and Final Calls  Do you have any ongoing symptoms?: No  Have your medications changed?: No  Do you have any questions related to your medications?: No  Do you currently have any active services?: Yes  Are you currently active with any services?: Home Health  Do you have any needs or concerns that I can assist you with?: No  Identified Barriers: Lack of Education  Care Transitions Interventions     Other Therapy Services: Completed    Home Care Waiver: Completed Physical Therapy: Completed       Transportation Support: Declined      DME Assistance: Declined   Disease Specific Clinic: Completed Occupational Therapy: Completed     Disease Association: Completed      Other Interventions:              Upcoming Appointments:    Future Appointments         Provider Specialty Dept Phone    2024 1:00 PM RadiologistSean Gen; SEAN

## 2024-07-16 ENCOUNTER — HOSPITAL ENCOUNTER (OUTPATIENT)
Dept: GENERAL RADIOLOGY | Age: 79
Discharge: HOME | End: 2024-07-18
Payer: MEDICARE

## 2024-07-16 DIAGNOSIS — R13.12 DYSPHAGIA, OROPHARYNGEAL PHASE: Primary | ICD-10-CM

## 2024-07-16 DIAGNOSIS — R13.10 ABNORMAL SWALLOWING: ICD-10-CM

## 2024-07-16 PROCEDURE — 92611 MOTION FLUOROSCOPY/SWALLOW: CPT

## 2024-07-16 PROCEDURE — 74230 X-RAY XM SWLNG FUNCJ C+: CPT

## 2024-07-16 PROCEDURE — 2500000003 HC RX 250 WO HCPCS: Performed by: NURSE PRACTITIONER

## 2024-07-16 RX ADMIN — BARIUM SULFATE 4.8 G: 0.6 CREAM ORAL at 13:28

## 2024-07-16 RX ADMIN — BARIUM SULFATE 176 G: 960 POWDER, FOR SUSPENSION ORAL at 13:29

## 2024-07-17 NOTE — PROCEDURES
restricted    Aspiration/Penetration Risk: Penetration-Aspiration Scale (PAS): 2 - Material enters the airway, remains above the vocal folds, and is ejected from the airway    Diet Recommendations:  Diet Solids Recommendation: Regular   Liquid Consistency Recommendation: Mildly Thick (Nectar), Ice chips after oral care   Recommended Form of Meds: Meds in puree     Compensatory/Postural Swallow Strategies:   Alternate solids and liquids, Effortful swallow, Small bites/sips, Remain upright for 30-45 minutes after meals, Swallow 2 times per bite/sip    Prognosis:  Prognosis for safe diet advancement: good  Barriers to reach goals: motivation    Therapy:  Requires SLP Intervention: Yes        Referrals:   Continue with SLP services - outpatient referral needed if d/c from home health     Therapeutic Interventions:  Oral motor exercises, Patient/Family education, Vital Stim/NMES, Therapeutic PO trials with SLP, Laryngeal exercises, Pharyngeal exercises, Diet tolerance monitoring    Patient tolerated today’s evaluation: Good    Treatment Given Today: [x] Evaluation    [x]Plans/ Goals discussed with pt/family/caregiver(s)                                        [x] Risks Benefits discussed with pt/family/caregiver(s)    SUMMARY:  Pt seen for modified barium swallow study this date.   Significant history of dysphagia since recent hospitalization for pneumonia.   Flash penetration observed with thin liquids by cup during the swallow when engaged in reciprocal swallowing. ST recommends mildly thick liquids and regular solids. Pt presents with mild/moderate oropharyngeal dysphagia and would continue to benefit from ST services to improve oral and pharyngeal function. Pt expressing desire to get back to thin liquids. Discussed use of ice chips after oral care with patient and patient in agreement to trial. Thin liquid trials approved during therapeutic exercise.     RECOMMENDATIONS:  [x] Patient to be seen by ST 1-2 times per

## 2024-07-23 ENCOUNTER — HOSPITAL ENCOUNTER (OUTPATIENT)
Dept: SPEECH THERAPY | Age: 79
Setting detail: THERAPIES SERIES
Discharge: HOME | End: 2024-07-23
Payer: MEDICARE

## 2024-07-23 PROCEDURE — 92526 ORAL FUNCTION THERAPY: CPT

## 2024-07-23 PROCEDURE — 92610 EVALUATE SWALLOWING FUNCTION: CPT

## 2024-07-23 NOTE — PROGRESS NOTES
[facility]    Facility/Department: [facility] SPEECH THERAPY    Speech Language Pathology    Clinical Bedside Swallow Evaluation    NAME:Toshia South    : 1945 (79 y.o.)    MRN: [id number]    ROOM: Room/bed info not found    ADMISSION DATE: 2024    PATIENT DIAGNOSIS(ES): Dysphagia, unspecified [R13.10]    No chief complaint on file.      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 ([facility]) 2022    History of subdural hematoma 2022    Fall as cause of accidental injury in home as place of occurrence, initial encounter 2022    IPF (idiopathic pulmonary fibrosis) ([facility]) 2022    Mild malnutrition ([facility]) 2022    Dyspnea, unspecified 10/14/2021    GERD (gastroesophageal reflux disease) 2012    Major depressive disorder, recurrent, mild 2024    Major depressive disorder, recurrent, moderate 2024    Major depressive disorder, recurrent, unspecified 2024    Citrobacter infection 2023    Dysphagia, oropharyngeal 2023    Zenker diverticulum 2023    Acute on chronic respiratory failure with hypoxia and hypercapnia ([facility]) 2023    Biventricular heart failure ([facility]) 2023    Bronchiectasis ([facility]) 2023    Malnutrition of mild degree ([facility]) 2023    Multifocal pneumonia 2023    Community acquired bacterial pneumonia 2023    COPD exacerbation ([facility]) 2023    Acute respiratory failure with hypoxia and hypercapnia ([facility]) 2023    PAF (paroxysmal atrial fibrillation) ([facility]) 2022    Anemia 10/14/2021    Biventricular congestive heart failure ([facility])     COPD (chronic obstructive pulmonary disease) ([facility]) 10/22/2020    Septic shock ([facility]) 2020    Hypothyroidism 10/05/2018    Major depressive disorder 10/05/2018    Chronic midline low back pain without sciatica

## 2024-07-23 NOTE — PROGRESS NOTES
Phone: [phone]                        [facility]    Fax: [phone]                                 Outpatient Speech Therapy                               DAILY TREATMENT NOTE    Date: 7/23/2024  Patient’s Name:  Toshia South  YOB: 1945 (79 y.o.)  Gender:  female  MRN:  [id number]  CSN #: [id number]  Referring physician:Jong Moraes         Precautions:       INSURANCE  Visit Information  SLP Insurance Information: Medicare  Total # of Visits Approved: 0  Total # of Visits to Date: 1  No Show: 0  Canceled Appointment: 0     Plan of Care/Recert ends    PAIN  [x]No     []Yes      Pain Rating (0-10 pain scale): 0  Location:  N/A  Pain Description: NA    SUBJECTIVE  Patient presents to clinic with her spouse.      SHORT TERM GOALS/ TREATMENT SESSION:  Subjective report:           Patient seen following BSE this date for dysphaiga tx.       Goal 1: Patient will complete recommended pharyngeal exercises 15-20x each independently.     Patient completed the following with Vitalstim at 5.5 mA with 2B placement for 30 minutes. Patient tolerated well without redness or irritation following removal of electrodes.     Effortful Swallow: 20  Lingual Pull backs: 30x   Maria Isabel: 15  Chin Tuck against resistance x 20      []Met  [x]Partially met  []Not met   Goal 2: Patient will utilize compensatory swallowing strategies during a meal or snack with no more than 2 verbal cues.       Did not trial this date due to focus on other goals. []Met  [x]Partially met  []Not met   Goal 3: Patient will trial thin liquids without overt s/sx of aspiration or penetration in 80% of opportunities.       Patient drank thin liquids without overt s/sx of aspiration or penetration x 6/6 trials. []Met  [x]Partially met  []Not met     LONG TERM GOALS/ TREATMENT SESSION:  Goal 1: Patient will improve PAS from 2 to 1 on repeat MBS following dysphagia treatment. Goal progressing. See STG data   []Met  [x]Partially

## 2024-07-29 ENCOUNTER — HOSPITAL ENCOUNTER (OUTPATIENT)
Dept: SPEECH THERAPY | Age: 79
Setting detail: THERAPIES SERIES
Discharge: HOME | End: 2024-07-29
Payer: MEDICARE

## 2024-07-29 PROCEDURE — 92526 ORAL FUNCTION THERAPY: CPT

## 2024-07-29 NOTE — PROGRESS NOTES
Phone: [phone]                        [facility]    Fax: [phone]                                 Outpatient Speech Therapy                               DAILY TREATMENT NOTE    Date: 7/29/2024  Patient’s Name:  Toshia South  YOB: 1945 (79 y.o.)  Gender:  female  MRN:  [id number]  [facility] #: [id number]  Referring physician:Jong Moraes    Diagnosis: Dysphagia, unspecified (R13.10)    Precautions:       INSURANCE  Visit Information  SLP Insurance Information: Medicare  Total # of Visits to Date: 2  No Show: 0  Canceled Appointment: 0     Plan of Care/Recert ends    PAIN  [x]No     []Yes      Pain Rating (0-10 pain scale): 0  Location:  N/A  Pain Description: NA    SUBJECTIVE  Patient presents to clinic with her spouse.      SHORT TERM GOALS/ TREATMENT SESSION:  Subjective report:           Patient seen for dysphagia treatment. Patient reports no new concerns. She states she has been doing her swallowing exercises a lot at home.        Goal 1: Patient will complete recommended pharyngeal exercises 15-20x each independently.     Patient completed the following with Vitalstim at 6.5 mA with 2B placement for 30 minutes. Patient tolerated well without redness or irritation following removal of electrodes.     Effortful Swallow: 56x   Lingual Pull backs: x40   Chin Tuck against resistance x 20  Maria Isabel x 40     Improved ease of swallowing noted this date.  []Met  [x]Partially met  []Not met   Goal 2: Patient will utilize compensatory swallowing strategies during a meal or snack with no more than 2 verbal cues.       Drank thin liquids with adequate strategy usage x 6/6 trials   []Met  [x]Partially met  []Not met   Goal 3: Patient will trial thin liquids without overt s/sx of aspiration or penetration in 80% of opportunities.       Patient drank thin liquids without overt s/sx of aspiration or penetration x 6/6 trials. []Met  [x]Partially met  []Not met     LONG TERM GOALS/ TREATMENT

## 2024-08-01 ENCOUNTER — HOSPITAL ENCOUNTER (OUTPATIENT)
Dept: GENERAL RADIOLOGY | Age: 79
Discharge: HOME | End: 2024-08-03
Payer: MEDICARE

## 2024-08-01 ENCOUNTER — HOSPITAL ENCOUNTER (OUTPATIENT)
Age: 79
Discharge: HOME | End: 2024-08-03
Payer: MEDICARE

## 2024-08-01 DIAGNOSIS — J44.1 COPD EXACERBATION (HCC): ICD-10-CM

## 2024-08-01 PROCEDURE — 71046 X-RAY EXAM CHEST 2 VIEWS: CPT

## 2024-08-02 ENCOUNTER — HOSPITAL ENCOUNTER (OUTPATIENT)
Dept: SPEECH THERAPY | Age: 79
Setting detail: THERAPIES SERIES
Discharge: HOME | End: 2024-08-02
Payer: MEDICARE

## 2024-08-02 PROCEDURE — 92526 ORAL FUNCTION THERAPY: CPT

## 2024-08-02 NOTE — PROGRESS NOTES
Phone: [phone]                        [facility]    Fax: [phone]                                 Outpatient Speech Therapy                               DAILY TREATMENT NOTE    Date: 8/2/2024  Patient’s Name:  Toshia South  YOB: 1945 (79 y.o.)  Gender:  female  MRN:  [id number]  [facility] #: [id number]  Referring physician:Jong Moraes    Diagnosis: Dysphagia, unspecified (R13.10)    Precautions:       INSURANCE  Visit Information  SLP Insurance Information: Medicare  Total # of Visits to Date: 3  No Show: 0  Canceled Appointment: 0     Plan of Care/Recert ends    PAIN  [x]No     []Yes      Pain Rating (0-10 pain scale): 0  Location:  N/A  Pain Description: NA    SUBJECTIVE  Patient presents to clinic with her spouse.      SHORT TERM GOALS/ TREATMENT SESSION:  Subjective report:           Patient reports last week her O2 dropped to 74% after oxygen was turned the wrong way. No new concerns reported.    Goal 1: Patient will complete recommended pharyngeal exercises 15-20x each independently.     Patient completed the following with Vitalstim at 6.5 mA with 2B placement for 30 minutes. Patient tolerated well without redness or irritation following removal of electrodes.     Effortful Swallow: 40x    Lingual Pull backs: x20   Chin Tuck against resistance x 20   Maria Isabel x 20  Mendelsohn Maneuver x     Improved ease of swallowing noted this date.  []Met  [x]Partially met  []Not met   Goal 2: Patient will utilize compensatory swallowing strategies during a meal or snack with no more than 2 verbal cues.       Utilized appropriate swallowing strategies in 100% of opportunities.  []Met  [x]Partially met  []Not met   Goal 3: Patient will trial thin liquids without overt s/sx of aspiration or penetration in 80% of opportunities.       Patient drank thin liquids without overt s/sx of aspiration or penetration x 11/11 trials.    Patient trialed regular solid consistencies with adequate bolus

## 2024-08-05 ENCOUNTER — HOSPITAL ENCOUNTER (OUTPATIENT)
Dept: SPEECH THERAPY | Age: 79
Setting detail: THERAPIES SERIES
Discharge: HOME | End: 2024-08-05
Payer: MEDICARE

## 2024-08-05 NOTE — PROGRESS NOTES
[facility]  Cancel Note/ No Show Note    Date: 2024  Patient Name: Toshia South        MRN: [id number]    Account #: [id number]  : 1945  (79 y.o.)  Gender: female                REASON FOR MISSED TREATMENT:    [x]Cancelled due to illness.  [] Therapist Cancelled Appointment  []Cancelled due to other appointment   []No Show / No call.  Pt called with next scheduled appointment.  [] Cancelled due to transportation conflict  []Cancelled due to weather  []Frequency of order changed  []Patient on hold due to:     []OTHER:        Electronically signed by:    Hermila Anthony M.S., CCC-SLP              Date:2024

## 2024-08-08 ENCOUNTER — HOSPITAL ENCOUNTER (OUTPATIENT)
Dept: SPEECH THERAPY | Age: 79
Setting detail: THERAPIES SERIES
Discharge: HOME | End: 2024-08-08
Payer: MEDICARE

## 2024-08-08 PROCEDURE — 92526 ORAL FUNCTION THERAPY: CPT

## 2024-08-08 NOTE — PROGRESS NOTES
Phone: [phone]                        [facility]    Fax: [phone]                                 Outpatient Speech Therapy                               DAILY TREATMENT NOTE    Date: 8/8/2024  Patient’s Name:  Toshia South  YOB: 1945 (79 y.o.)  Gender:  female  MRN:  [id number]  [facility] #: [id number]  Referring physician:Jong Moraes    Diagnosis: Dysphagia, unspecified (R13.10)    Precautions:       INSURANCE  Visit Information  SLP Insurance Information: Medicare  Total # of Visits to Date: 4  No Show: 0  Canceled Appointment: 1     Plan of Care/Recert ends    PAIN  [x]No     []Yes      Pain Rating (0-10 pain scale): 0  Location:  N/A  Pain Description: NA    SUBJECTIVE  Patient presents to clinic with her spouse.      SHORT TERM GOALS/ TREATMENT SESSION:  Subjective report:           Patient reports last week her O2 dropped to 74% after oxygen was turned the wrong way. No new concerns reported.    Goal 1: Patient will complete recommended pharyngeal exercises 15-20x each independently.     Patient completed the following with Vitalstim at 6.5 mA with 2B placement for 30 minutes. Patient tolerated well without redness or irritation following removal of electrodes.     Effortful Swallow: x 50  Lingual Pull backs: x 40   Chin Tuck against resistance x 20   Maria Isabel x 20   Mendelsohn Maneuver x 20     Improved ease of swallowing noted this date.  []Met  [x]Partially met  []Not met   Goal 2: Patient will utilize compensatory swallowing strategies during a meal or snack with no more than 2 verbal cues.       Utilized appropriate swallowing strategies in 100% of opportunities.  []Met  [x]Partially met  []Not met   Goal 3: Patient will trial thin liquids without overt s/sx of aspiration or penetration in 80% of opportunities.       Patient drank thin liquids without overt s/sx of aspiration or penetration x 14/14 trials. Cannot rule out silent aspiration at bedside.     Patient

## 2024-08-15 ENCOUNTER — OFFICE VISIT (OUTPATIENT)
Age: 79
End: 2024-08-15
Payer: MEDICARE

## 2024-08-15 ENCOUNTER — TELEPHONE (OUTPATIENT)
Age: 79
End: 2024-08-15

## 2024-08-15 VITALS
SYSTOLIC BLOOD PRESSURE: 173 MMHG | HEART RATE: 101 BPM | OXYGEN SATURATION: 98 % | WEIGHT: 135 LBS | RESPIRATION RATE: 18 BRPM | DIASTOLIC BLOOD PRESSURE: 81 MMHG | BODY MASS INDEX: 22.47 KG/M2

## 2024-08-15 DIAGNOSIS — M47.817 LUMBOSACRAL SPONDYLOSIS WITHOUT MYELOPATHY: ICD-10-CM

## 2024-08-15 DIAGNOSIS — M96.1 LUMBAR POST-LAMINECTOMY SYNDROME: ICD-10-CM

## 2024-08-15 DIAGNOSIS — M51.36 LUMBAR DEGENERATIVE DISC DISEASE: ICD-10-CM

## 2024-08-15 DIAGNOSIS — M54.16 LUMBAR RADICULOPATHY: Primary | ICD-10-CM

## 2024-08-15 DIAGNOSIS — Z79.01 LONG TERM (CURRENT) USE OF ANTICOAGULANTS: ICD-10-CM

## 2024-08-15 PROCEDURE — 1090F PRES/ABSN URINE INCON ASSESS: CPT | Performed by: STUDENT IN AN ORGANIZED HEALTH CARE EDUCATION/TRAINING PROGRAM

## 2024-08-15 PROCEDURE — 1123F ACP DISCUSS/DSCN MKR DOCD: CPT | Performed by: STUDENT IN AN ORGANIZED HEALTH CARE EDUCATION/TRAINING PROGRAM

## 2024-08-15 PROCEDURE — G8399 PT W/DXA RESULTS DOCUMENT: HCPCS | Performed by: STUDENT IN AN ORGANIZED HEALTH CARE EDUCATION/TRAINING PROGRAM

## 2024-08-15 PROCEDURE — 99204 OFFICE O/P NEW MOD 45 MIN: CPT | Performed by: STUDENT IN AN ORGANIZED HEALTH CARE EDUCATION/TRAINING PROGRAM

## 2024-08-15 PROCEDURE — G8420 CALC BMI NORM PARAMETERS: HCPCS | Performed by: STUDENT IN AN ORGANIZED HEALTH CARE EDUCATION/TRAINING PROGRAM

## 2024-08-15 PROCEDURE — G8427 DOCREV CUR MEDS BY ELIG CLIN: HCPCS | Performed by: STUDENT IN AN ORGANIZED HEALTH CARE EDUCATION/TRAINING PROGRAM

## 2024-08-15 PROCEDURE — 1036F TOBACCO NON-USER: CPT | Performed by: STUDENT IN AN ORGANIZED HEALTH CARE EDUCATION/TRAINING PROGRAM

## 2024-08-15 NOTE — H&P (VIEW-ONLY)
Chronic Pain Clinic Note     Encounter Date: 8/15/2024     SUBJECTIVE:  Chief Complaint   Patient presents with    New Patient    Back Pain       History of Present Illness:   Toshia South is a 79 y.o. female who presents with low back pain, radiating LLE into the foot. The patient states that this is chronic. She has had multiple back surgeries, one being a laminectomy L3/4/5. She has also had a cervical surgery. She has followed with [facility] in the past- wanted to be closer to home. She takes Norco orally and also has a pain pump on the anterior abdomen. She goes to have her pump adjusted every 3 months.     Medication Refill: n/a     Current Complaints of Pain:   Location: Low back  Radiation: LLE  Severity: severe  Pain Numerical Score - 8   Average: 7     Highest: 10  Lowest: 7  Character/Quality: Complains of pain that is aching, burning, dull, sharp, throbbing \"all of it\"  Timing: Wakes from sleep, constant   Associated symptoms: weakness  Numbness: no  Weakness: LLE   Exacerbating factors:  walking   Alleviating factors: Rest  Length of time pain has been present: Started on - chronic   Inciting event/injury: no   Bowel/Bladder incontinence: no   Falls: yes   Physical Therapy: yes    History of Interventions:   Surgery: H/O Lumbar and cervical surgeries  Injections: yes     Imaging:    CT Lumbar 2/13/24  CT Cervical 2/13/24     Past Medical History:   Diagnosis Date    A-fib ([facility])     Anxiety     Atrial fibrillation ([facility])     Biventricular congestive heart failure (HCC)     Bronchiectasis with acute exacerbation ([facility]) 04/29/2022    CAD (coronary artery disease)     Chronic pain syndrome     dilaudid infusion pump    COPD (chronic obstructive pulmonary disease) (HCC)     Depression     GERD (gastroesophageal reflux disease)     Hypothyroidism     Impaired fasting glucose     Iron deficiency anemia     Osteoporosis     Pulmonary fibrosis ([facility]) 04/29/2022    Subdural hematoma ([facility]) 08/23/2022  Attack Father 63    Heart Attack Sister     Heart Disease Brother        Social History     Socioeconomic History    Marital status:      Spouse name: Not on file    Number of children: Not on file    Years of education: Not on file    Highest education level: Not on file   Occupational History    Not on file   Tobacco Use    Smoking status: Former     Current packs/day: 0.00     Average packs/day: 1 pack/day for 10.0 years (10.0 ttl pk-yrs)     Types: Cigarettes     Start date: 1966     Quit date: 1976     Years since quittin.7    Smokeless tobacco: Never   Vaping Use    Vaping status: Never Used   Substance and Sexual Activity    Alcohol use: No    Drug use: No    Sexual activity: Not on file   Other Topics Concern    Not on file   Social History Narrative    Not on file     Social Determinants of Health     Financial Resource Strain: Low Risk  (2024)    Overall Financial Resource Strain (CARDIA)     Difficulty of Paying Living Expenses: Not hard at all   Food Insecurity: No Food Insecurity (2024)    Hunger Vital Sign     Worried About Running Out of Food in the Last Year: Never true     Ran Out of Food in the Last Year: Never true   Transportation Needs: No Transportation Needs (2024)    PRAPARE - Transportation     Lack of Transportation (Medical): No     Lack of Transportation (Non-Medical): No   Physical Activity: Inactive (10/3/2023)    Exercise Vital Sign     Days of Exercise per Week: 0 days     Minutes of Exercise per Session: 0 min   Stress: Not on file   Social Connections: Not on file   Intimate Partner Violence: Not on file   Housing Stability: Low Risk  (2024)    Housing Stability Vital Sign     Unable to Pay for Housing in the Last Year: No     Number of Places Lived in the Last Year: 1     Unstable Housing in the Last Year: No       Medications & Allergies:   Current Outpatient Medications   Medication Instructions    alendronate (FOSAMAX) 70 MG tablet TAKE

## 2024-08-15 NOTE — TELEPHONE ENCOUNTER
Toshia South     1945        female    [address]  [facility] [address]                    Legal Guardian no  If yes, Name:       Skilled Facility no     If yes, Name:                                             Home Phone: [phone]         Cell Phone:    Telephone Information:   Mobile [phone]                                           Surgeon: Rach   Surgery Date: 8/29/24                      Time:     Procedure: caudal epidural steroid injection  Duration:    Diagnosis: M54.16    CPT Codes: 82727    Important Medical History:  In Epic    First Assistant no  Special Inst/Equip/Implants: Regular    Nickel allergy  no  Latex Allergy: no      Cardiac Device:  No  If yes, need most recent pacemaker interrogation from Cardiologist:  Type of pacemaker:    Anesthesia:    Local                       Admission Type:  Same Day                          Admit Prior to Day of Surgery: No    Case Location:  Ambulatory            Preadmission Testing:               PAT Date and Time:     Need Preop Cardiac Clearance: yes - hold Xarelto 3 days, Hold ASA 7 days   Need Pre-op/Medical Clearance:    Does Patient have Cardiologist/physician? Yes   Name of Physician:   Isidra    Special Needs Communication:  Rhianna Lift no     needed no

## 2024-08-15 NOTE — PROGRESS NOTES
program    Referrals:  -None    Follow-up Plan:  -After procedure    Patient was offered intervention where appropriate.     Multi-modal Pain Therapy:  The patient was explicitly considered for multimodal and interdisciplinary therapy. Non-opioid and non-pharmacological opportunities to enhance analgesia and quality of life have been and will continue to be pursued.    Josemanuel Loyd,   Interventional Pain Management/PM&R   [facility]    Orders Placed This Encounter    Lumbar Epidural Steroid Injection/Caudal     Standing Status:   Future     Standing Expiration Date:   8/15/2025     Scheduling Instructions:      Caudal ASHLEY      Hold Xarelto 3 days      Hold ASA 7 days

## 2024-08-19 NOTE — PROGRESS NOTES
[facility]   Preadmission Testing    Name: Toshia South  : 1945  Patient Phone: [phone] (home)     Procedure: CAUDAL EPIDURAL STEROID INJECTION   Date of Procedure: 2024  Surgeon: Josemanuel Loyd DO    Ht:  165.1 cm (5' 5\")  Wt: 61.2 kg (135 lb)  Wt method: Stated    Allergies:   Allergies   Allergen Reactions    Seasonal Other (See Comments)     Nasal congestion, runny nose                There were no vitals filed for this visit.    No LMP recorded. Patient has had a hysterectomy.    Do you take blood thinners?   [x] Yes    [] No         Instructed to stop blood thinners prior to procedure?    [x] Yes    [] No      [] N/A    []Eliquis - 3 days  [x]Xarelto - 3 days  []Pradaxa - 5 days  []Coumadin - 5 days   []Plavix - 7 days  [x]ASA 325mg - 7 days  []Brillinta - 5 days  []Pletal - 2 days   Have you had a respiratory infection or sore throat in last 4 weeks before surgery?    [] Yes    [x] No     Patient instructed on: [x] NPO Status - if receiving sedation  [x] Meds to Take  [x] Ride Home - sedation/ epidurals  [x]No Jewelry/Contact Lenses/Nail Polish     DOS Patient Needs [] HCG   [] Blood Sugar  [] PT/INR

## 2024-08-21 ENCOUNTER — HOSPITAL ENCOUNTER (OUTPATIENT)
Dept: SPEECH THERAPY | Age: 79
Setting detail: THERAPIES SERIES
Discharge: HOME | End: 2024-08-21
Payer: MEDICARE

## 2024-08-21 PROCEDURE — 92526 ORAL FUNCTION THERAPY: CPT

## 2024-08-21 NOTE — PROGRESS NOTES
Phone: [phone]                        [facility]    Fax: [phone]                                 Outpatient Speech Therapy                               DAILY TREATMENT NOTE    Date: 8/21/2024  Patient’s Name:  Toshia South  YOB: 1945 (79 y.o.)  Gender:  female  MRN:  [id number]  [facility] #: [id number]  Referring physician:Jong Moraes    Diagnosis: Dysphagia, unspecified (R13.10)    Precautions:       INSURANCE  Visit Information  SLP Insurance Information: Medicare  Total # of Visits to Date: 5  No Show: 0  Canceled Appointment: 1     Plan of Care/Recert ends    PAIN  [x]No     []Yes      Pain Rating (0-10 pain scale): 0  Location:  N/A  Pain Description: NA    SUBJECTIVE  Patient presents to clinic with her spouse.      SHORT TERM GOALS/ TREATMENT SESSION:  Subjective report:           Patient reports increased pain in left leg. No other changes or concerns with swallowing at this time.     Goal 1: Patient will complete recommended pharyngeal exercises 15-20x each independently.     Patient completed the following with Vitalstim at 7.0 mA with 2B placement for 30 minutes. Patient tolerated well without redness or irritation following removal of electrodes.     Effortful Swallow: x80   Lingual Pull backs: x20   Chin Tuck against resistance x 20   Maria Isabel x 25  Mendelsohn Maneuver x 20     Improved ease of swallowing noted this date.  []Met  [x]Partially met  []Not met   Goal 2: Patient will utilize compensatory swallowing strategies during a meal or snack with no more than 2 verbal cues.       Utilized appropriate swallowing strategies in 100% of opportunities.  []Met  [x]Partially met  []Not met   Goal 3: Patient will trial thin liquids without overt s/sx of aspiration or penetration in 80% of opportunities.       Patient drank thin liquids without overt s/sx of aspiration or penetration x 14/14  trials.      Patient did not wish to trial regular solids consistencies this

## 2024-08-23 ENCOUNTER — HOSPITAL ENCOUNTER (OUTPATIENT)
Dept: SPEECH THERAPY | Age: 79
Setting detail: THERAPIES SERIES
Discharge: HOME | End: 2024-08-23
Payer: MEDICARE

## 2024-08-23 PROCEDURE — 92507 TX SP LANG VOICE COMM INDIV: CPT

## 2024-08-23 PROCEDURE — 92526 ORAL FUNCTION THERAPY: CPT

## 2024-08-23 NOTE — PROGRESS NOTES
consistencies this date) []Met  [x]Partially met  []Not met     LONG TERM GOALS/ TREATMENT SESSION:  Goal 1: Patient will improve PAS from 2 to 1 on repeat MBS following dysphagia treatment. Goal progressing. See STG data   []Met  [x]Partially met  []Not met       EDUCATION/HOME EXERCISE PROGRAM (HEP)  New Education/HEP provided to patient/family/caregiver: Educated on POC, diet recommendations and compensatory swallowing strategies.     Method of Education:     [x]Discussion     []Demonstration    [] Written     []Other  Evaluation of Patient’s Response to Education:         [x]Patient and or caregiver verbalized understanding  []Patient and or Caregiver Demonstrated without assistance   []Patient and or Caregiver Demonstrated with assistance  []Needs additional instruction to demonstrate understanding of education    ASSESSMENT  Patient tolerated today’s treatment session:    [x] Good   []  Fair   []  Poor  Limitations/difficulties with treatment session due to:   []Pain     []Fatigue     []Other medical complications     []Other    Comments:    PLAN  [x]Continue with current plan of care  []Medical “Hold”  []I“Hold” per patient request  [] Change Treatment plan:  [] Insurance hold  __ Other    Minutes Tracking:  SLP Individual Minutes  Time In: 1030  Time Out: 1115  Minutes: 45    Charges: 1  Electronically signed by:    Hermila Anthony M.S., CCC-SLP              Date:8/23/2024

## 2024-08-28 ENCOUNTER — HOSPITAL ENCOUNTER (OUTPATIENT)
Dept: SPEECH THERAPY | Age: 79
Setting detail: THERAPIES SERIES
Discharge: HOME | End: 2024-08-28
Payer: MEDICARE

## 2024-08-28 PROCEDURE — 92526 ORAL FUNCTION THERAPY: CPT

## 2024-08-28 NOTE — PROGRESS NOTES
Phone: [phone]                        [facility]    Fax: [phone]                                 Outpatient Speech Therapy                               DAILY TREATMENT NOTE    Date: 8/28/2024  Patient’s Name:  Toshia South  YOB: 1945 (79 y.o.)  Gender:  female  MRN:  [id number]  [facility] #: [id number]  Referring physician:Jong Moraes    Diagnosis: Dysphagia, unspecified (R13.10)    Precautions:       INSURANCE  Visit Information  SLP Insurance Information: Medicare  Total # of Visits to Date: 7  No Show: 0  Canceled Appointment: 1     Plan of Care/Recert ends    PAIN  [x]No     []Yes      Pain Rating (0-10 pain scale): 0  Location:  N/A  Pain Description: NA    SUBJECTIVE  Patient presents to clinic with her spouse.      SHORT TERM GOALS/ TREATMENT SESSION:  Subjective report:           Patient reports no new changes or concerns at this time. ST educated on risks of aspiration pneumonia, recommendations for repeat MBS, rationale for Yap Free Water Protocol.     Goal 1: Patient will complete recommended pharyngeal exercises 15-20x each independently.     Patient completed the following with Vitalstim at 7.5 mA with 2B placement for 30 minutes. Patient tolerated well without redness or irritation following removal of electrodes.     Effortful Swallow: x60   Lingual Pull backs: x25    Chin Tuck against resistance x 20  Maria Isabel x 20  Mendelsohn Maneuver x 25    Improved ease and efficiency of swallowing noted this date.  []Met  [x]Partially met  []Not met   Goal 2: Patient will utilize compensatory swallowing strategies during a meal or snack with no more than 2 verbal cues.       Utilized appropriate swallowing strategies in 100% of opportunities.  [x]Met  []Partially met  []Not met   Goal 3: Patient will trial thin liquids without overt s/sx of aspiration or penetration in 80% of opportunities.       Patient drank thin liquids without overt s/sx of aspiration or penetration  x 13/16 trials.    Throat clear x 3 with reported globus sensation which cleared with liquid wash.     Patient did not wish to trial regular solids consistencies this date) []Met  [x]Partially met  []Not met     LONG TERM GOALS/ TREATMENT SESSION:  Goal 1: Patient will improve PAS from 2 to 1 on repeat MBS following dysphagia treatment. Goal progressing. See STG data   []Met  [x]Partially met  []Not met       EDUCATION/HOME EXERCISE PROGRAM (HEP)  New Education/HEP provided to patient/family/caregiver: Educated on POC, diet recommendations and compensatory swallowing strategies.     Method of Education:     [x]Discussion     []Demonstration    [] Written     []Other  Evaluation of Patient’s Response to Education:         [x]Patient and or caregiver verbalized understanding  []Patient and or Caregiver Demonstrated without assistance   []Patient and or Caregiver Demonstrated with assistance  []Needs additional instruction to demonstrate understanding of education    ASSESSMENT  Patient tolerated today’s treatment session:    [x] Good   []  Fair   []  Poor  Limitations/difficulties with treatment session due to:   []Pain     []Fatigue     []Other medical complications     []Other    Comments:/    PLAN  [x]Continue with current plan of care  []Medical “Hold”  []I“Hold” per patient request  [] Change Treatment plan:  [] Insurance hold  __ Other    Minutes Tracking:  SLP Individual Minutes  Time In: 1015  Time Out: 1100  Minutes: 45    Charges: 1  Electronically signed by:    Hermila Anthony M.S., CCC-SLP              Date:8/28/2024

## 2024-08-29 ENCOUNTER — HOSPITAL ENCOUNTER (OUTPATIENT)
Age: 79
Setting detail: OUTPATIENT SURGERY
Discharge: HOME | End: 2024-08-29
Attending: STUDENT IN AN ORGANIZED HEALTH CARE EDUCATION/TRAINING PROGRAM | Admitting: STUDENT IN AN ORGANIZED HEALTH CARE EDUCATION/TRAINING PROGRAM
Payer: MEDICARE

## 2024-08-29 ENCOUNTER — APPOINTMENT (OUTPATIENT)
Dept: GENERAL RADIOLOGY | Age: 79
End: 2024-08-29
Attending: STUDENT IN AN ORGANIZED HEALTH CARE EDUCATION/TRAINING PROGRAM
Payer: MEDICARE

## 2024-08-29 VITALS
HEIGHT: 65 IN | OXYGEN SATURATION: 100 % | TEMPERATURE: 97 F | WEIGHT: 135 LBS | HEART RATE: 71 BPM | DIASTOLIC BLOOD PRESSURE: 65 MMHG | SYSTOLIC BLOOD PRESSURE: 156 MMHG | BODY MASS INDEX: 22.49 KG/M2 | RESPIRATION RATE: 17 BRPM

## 2024-08-29 PROCEDURE — 6360000002 HC RX W HCPCS: Performed by: STUDENT IN AN ORGANIZED HEALTH CARE EDUCATION/TRAINING PROGRAM

## 2024-08-29 PROCEDURE — 2500000003 HC RX 250 WO HCPCS: Performed by: STUDENT IN AN ORGANIZED HEALTH CARE EDUCATION/TRAINING PROGRAM

## 2024-08-29 PROCEDURE — 3600000058 HC PAIN LEVEL 5 BASE: Performed by: STUDENT IN AN ORGANIZED HEALTH CARE EDUCATION/TRAINING PROGRAM

## 2024-08-29 PROCEDURE — 2580000003 HC RX 258: Performed by: STUDENT IN AN ORGANIZED HEALTH CARE EDUCATION/TRAINING PROGRAM

## 2024-08-29 PROCEDURE — 2709999900 HC NON-CHARGEABLE SUPPLY: Performed by: STUDENT IN AN ORGANIZED HEALTH CARE EDUCATION/TRAINING PROGRAM

## 2024-08-29 PROCEDURE — 7100000011 HC PHASE II RECOVERY - ADDTL 15 MIN: Performed by: STUDENT IN AN ORGANIZED HEALTH CARE EDUCATION/TRAINING PROGRAM

## 2024-08-29 PROCEDURE — 6360000004 HC RX CONTRAST MEDICATION: Performed by: STUDENT IN AN ORGANIZED HEALTH CARE EDUCATION/TRAINING PROGRAM

## 2024-08-29 PROCEDURE — 7100000010 HC PHASE II RECOVERY - FIRST 15 MIN: Performed by: STUDENT IN AN ORGANIZED HEALTH CARE EDUCATION/TRAINING PROGRAM

## 2024-08-29 PROCEDURE — 62323 NJX INTERLAMINAR LMBR/SAC: CPT | Performed by: STUDENT IN AN ORGANIZED HEALTH CARE EDUCATION/TRAINING PROGRAM

## 2024-08-29 RX ORDER — LIDOCAINE HYDROCHLORIDE 10 MG/ML
INJECTION, SOLUTION EPIDURAL; INFILTRATION; INTRACAUDAL; PERINEURAL PRN
Status: DISCONTINUED | OUTPATIENT
Start: 2024-08-29 | End: 2024-08-29 | Stop reason: ALTCHOICE

## 2024-08-29 RX ORDER — TRIAMCINOLONE ACETONIDE 40 MG/ML
INJECTION, SUSPENSION INTRA-ARTICULAR; INTRAMUSCULAR PRN
Status: DISCONTINUED | OUTPATIENT
Start: 2024-08-29 | End: 2024-08-29 | Stop reason: ALTCHOICE

## 2024-08-29 RX ORDER — SODIUM CHLORIDE 9 MG/ML
INJECTION, SOLUTION INTRAMUSCULAR; INTRAVENOUS; SUBCUTANEOUS PRN
Status: DISCONTINUED | OUTPATIENT
Start: 2024-08-29 | End: 2024-08-29 | Stop reason: ALTCHOICE

## 2024-08-29 ASSESSMENT — PAIN DESCRIPTION - DESCRIPTORS: DESCRIPTORS: SHARP;SHOOTING

## 2024-08-29 ASSESSMENT — PAIN - FUNCTIONAL ASSESSMENT: PAIN_FUNCTIONAL_ASSESSMENT: 0-10

## 2024-08-29 NOTE — OP NOTE
PROCEDURE PERFORMED: Caudal epidural steroid injection    PREOPERATIVE DIAGNOSIS: Lumbosacral radiculopathy    INDICATIONS: Radicular leg pain    The patient's history and physical exam were reviewed.  The risk, benefits, and alternatives of the procedure were discussed and all questions were answered to the patient's satisfaction.  The patient agreed to proceed and written informed consent was obtained.    POSTOPERATIVE DIAGNOSIS: Same    PHYSICIAN:  Dr. Josemanuel Loyd DO    ANESTHESIA:  LOCAL    ASSISTANT:  NONE    PATHOLOGY:  NONE    ESTIMATED BLOOD LOSS:  N/A    IMPLANTS:  NONE    PROCEDURE DESCRIPTION: Caudal epidural injection using fluoroscopy    The patient was placed on the operative bed in prone position.  The area was prepped with  Chlorhexidine.  The area was then draped in a sterile fashion.  A lateral fluoroscopic view was obtained to observe for the entry point of the sacral hiatus.  The sacral hiatus was identified and marked.  The skin and subcutaneous tissues were anesthetized with 1% lidocaine.  A 22-gauge 3-1/2 inch Quincke spinal needle was then advanced through the sacral hiatus.  It was then advanced in the lateral fluoroscopic view until the appropriate level was reached.  After negative aspiration was confirmed, Omnipaque was injected.  Epidural spread was observed.  The epidural spread was also confirmed in the AP view.  Then after negative aspiration, the injectate was easily injected.  The injectate solution consisted of 80 mg triamcinolone and 5 mL preservative-free normal saline.  The needle was removed and the patient's back was dressed appropriately.  There was no neurological or hemodynamic sequelae after the procedure.  The patient was transferred to the postoperative care unit in stable condition.  Written discharge instructions were given to the patient.    COMPLICATIONS:  There were no apparent complications.  The patient tolerated the procedure well.

## 2024-08-29 NOTE — INTERVAL H&P NOTE
Update History & Physical    The patient's History and Physical of August 15, 2024 was reviewed with the patient and I examined the patient. There was no change. The surgical site was confirmed by the patient and me.     Plan: The risks, benefits, expected outcome, and alternative to the recommended procedure have been discussed with the patient. Patient understands and wants to proceed with the procedure.     ASA CLASSIFICATION  2  MP   CLASSIFICATION  2    Electronically signed by Josemanuel Loyd DO on 8/29/2024 at 1:59 PM

## 2024-08-30 ENCOUNTER — HOSPITAL ENCOUNTER (OUTPATIENT)
Dept: SPEECH THERAPY | Age: 79
Setting detail: THERAPIES SERIES
Discharge: HOME | End: 2024-08-30
Payer: MEDICARE

## 2024-08-30 PROCEDURE — 92526 ORAL FUNCTION THERAPY: CPT

## 2024-08-30 NOTE — PROGRESS NOTES
[facility]  Cancel Note/ No Show Note    Date: 2024  Patient Name: Toshia South        MRN: [id number]    Account #: [id number]  : 1945  (79 y.o.)  Gender: female                REASON FOR MISSED TREATMENT:    []Cancelled due to illness.  [x] Therapist Cancelled Appointment  []Cancelled due to other appointment   []No Show / No call.  Pt called with next scheduled appointment.  [] Cancelled due to transportation conflict  []Cancelled due to weather  []Frequency of order changed  []Patient on hold due to:     []OTHER:        Electronically signed by:    Hermila Anthony M.S., CCC-SLP              Date:2024

## 2024-08-30 NOTE — PROGRESS NOTES
Phone: [phone]                        [place]    Fax: [phone]                                 Outpatient Speech Therapy                               DAILY TREATMENT NOTE    Date: 8/30/2024  Patient’s Name:  Toshia South  YOB: 1945 (79 y.o.)  Gender:  female  MRN:  [id number]  [facility] #: [id number]  Referring physician:Jong Moraes    Diagnosis: Dysphagia, unspecified (R13.10)    Precautions:       INSURANCE  Visit Information  SLP Insurance Information: Medicare  Total # of Visits to Date: 8  No Show: 0  Canceled Appointment: 1     Plan of Care/Recert ends    PAIN  []No     [x]Yes      Pain Rating (0-10 pain scale): 6  Location:  Back and leg  Pain Description: NA    SUBJECTIVE  Patient presents to clinic with her spouse.      SHORT TERM GOALS/ TREATMENT SESSION:  Subjective report:          Patient reports getting injection in back yesterday and states she feels like it's going to help her. She reports having a rough morning, but does not clarify. ST provided encouragement and support. Session ended early due to reduced tolerance of therapy exercises.    Goal 1: Patient will complete recommended pharyngeal exercises 15-20x each independently.     Patient completed the following with Vitalstim at 7.0 mA with 2B placement for 23 minutes. Patient tolerated well without redness or irritation following removal of electrodes.     Effortful Swallow: x36  Lingual Pull backs: x50  Chin Tuck against resistance x 10  Maria Isabel x 25   Mendelsohn Maneuver x 25    All produced with min A    Improved ease and efficiency of swallowing noted this date.  []Met  [x]Partially met  []Not met   Goal 2: Patient will utilize compensatory swallowing strategies during a meal or snack with no more than 2 verbal cues.       Utilized appropriate swallowing strategies in 100% of opportunities.  [x]Met  []Partially met  []Not met   Goal 3: Patient will trial thin liquids without overt s/sx of aspiration

## 2024-09-02 ENCOUNTER — HOSPITAL ENCOUNTER (EMERGENCY)
Age: 79
Discharge: HOME | End: 2024-09-02
Payer: MEDICARE

## 2024-09-02 ENCOUNTER — APPOINTMENT (OUTPATIENT)
Dept: GENERAL RADIOLOGY | Age: 79
End: 2024-09-02
Payer: MEDICARE

## 2024-09-02 ENCOUNTER — APPOINTMENT (OUTPATIENT)
Dept: CT IMAGING | Age: 79
End: 2024-09-02
Payer: MEDICARE

## 2024-09-02 VITALS
HEART RATE: 80 BPM | TEMPERATURE: 98.1 F | DIASTOLIC BLOOD PRESSURE: 95 MMHG | OXYGEN SATURATION: 93 % | RESPIRATION RATE: 16 BRPM | SYSTOLIC BLOOD PRESSURE: 128 MMHG

## 2024-09-02 DIAGNOSIS — S16.1XXA ACUTE CERVICAL MYOFASCIAL STRAIN, INITIAL ENCOUNTER: ICD-10-CM

## 2024-09-02 DIAGNOSIS — R42 POSTURAL DIZZINESS WITH NEAR SYNCOPE: ICD-10-CM

## 2024-09-02 DIAGNOSIS — S09.90XA CLOSED HEAD INJURY, INITIAL ENCOUNTER: Primary | ICD-10-CM

## 2024-09-02 DIAGNOSIS — R55 POSTURAL DIZZINESS WITH NEAR SYNCOPE: ICD-10-CM
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
## 2024-09-02 LAB
ALBUMIN SERPL-MCNC: 4.1 G/DL (ref 3.5–5.2)
ALBUMIN/GLOB SERPL: 1.4 {RATIO} (ref 1–2.5)
ALP SERPL-CCNC: 82 U/L (ref 35–104)
ALT SERPL-CCNC: 10 U/L (ref 10–35)
ANION GAP SERPL CALCULATED.3IONS-SCNC: 8 MMOL/L (ref 9–16)
AST SERPL-CCNC: 16 U/L (ref 10–35)
BASOPHILS # BLD: 0.04 K/UL (ref 0–0.2)
BASOPHILS NFR BLD: 1 % (ref 0–2)
BILIRUB SERPL-MCNC: <0.2 MG/DL (ref 0–1.2)
BILIRUB UR QL STRIP: NEGATIVE
BUN SERPL-MCNC: 13 MG/DL (ref 8–23)
BUN/CREAT SERPL: 26 (ref 9–20)
CALCIUM SERPL-MCNC: 9.5 MG/DL (ref 8.6–10.4)
CHLORIDE SERPL-SCNC: 99 MMOL/L (ref 98–107)
CLARITY UR: CLEAR
CO2 SERPL-SCNC: 32 MMOL/L (ref 20–31)
COLOR UR: YELLOW
CREAT SERPL-MCNC: 0.5 MG/DL (ref 0.5–0.9)
EOSINOPHIL # BLD: <0.03 K/UL (ref 0–0.44)
EOSINOPHILS RELATIVE PERCENT: 0 % (ref 1–4)
EPI CELLS #/AREA URNS HPF: NORMAL /HPF (ref 0–25)
ERYTHROCYTE [DISTWIDTH] IN BLOOD BY AUTOMATED COUNT: 13.1 % (ref 11.8–14.4)
GFR, ESTIMATED: >90 ML/MIN/1.73M2
GLUCOSE SERPL-MCNC: 90 MG/DL (ref 74–99)
GLUCOSE UR STRIP-MCNC: NEGATIVE MG/DL
HCT VFR BLD AUTO: 40.3 % (ref 36.3–47.1)
HGB BLD-MCNC: 13.1 G/DL (ref 11.9–15.1)
HGB UR QL STRIP.AUTO: NEGATIVE
IMM GRANULOCYTES # BLD AUTO: <0.03 K/UL (ref 0–0.3)
IMM GRANULOCYTES NFR BLD: 0 %
KETONES UR STRIP-MCNC: NEGATIVE MG/DL
LEUKOCYTE ESTERASE UR QL STRIP: NEGATIVE
LYMPHOCYTES NFR BLD: 1.63 K/UL (ref 1.1–3.7)
LYMPHOCYTES RELATIVE PERCENT: 20 % (ref 24–43)
MAGNESIUM SERPL-MCNC: 2 MG/DL (ref 1.6–2.4)
MCH RBC QN AUTO: 31.3 PG (ref 25.2–33.5)
MCHC RBC AUTO-ENTMCNC: 32.5 G/DL (ref 28.4–34.8)
MCV RBC AUTO: 96.2 FL (ref 82.6–102.9)
MONOCYTES NFR BLD: 0.6 K/UL (ref 0.1–1.2)
MONOCYTES NFR BLD: 7 % (ref 3–12)
NEUTROPHILS NFR BLD: 72 % (ref 36–65)
NEUTS SEG NFR BLD: 5.87 K/UL (ref 1.5–8.1)
NITRITE UR QL STRIP: NEGATIVE
NRBC BLD-RTO: 0 PER 100 WBC
PH UR STRIP: 7.5 [PH] (ref 5–9)
PLATELET # BLD AUTO: 313 K/UL (ref 138–453)
PMV BLD AUTO: 9 FL (ref 8.1–13.5)
POTASSIUM SERPL-SCNC: 4.2 MMOL/L (ref 3.7–5.3)
PROT SERPL-MCNC: 7.1 G/DL (ref 6.6–8.7)
PROT UR STRIP-MCNC: NEGATIVE MG/DL
RBC # BLD AUTO: 4.19 M/UL (ref 3.95–5.11)
RBC #/AREA URNS HPF: NORMAL /HPF (ref 0–2)
SODIUM SERPL-SCNC: 139 MMOL/L (ref 136–145)
SP GR UR STRIP: 1.01 (ref 1.01–1.02)
TROPONIN I SERPL HS-MCNC: 9 NG/L (ref 0–14)
UROBILINOGEN UR STRIP-ACNC: NORMAL EU/DL (ref 0–1)
WBC #/AREA URNS HPF: NORMAL /HPF (ref 0–5)
WBC OTHER # BLD: 8.2 K/UL (ref 3.5–11.3)

## 2024-09-02 PROCEDURE — 80053 COMPREHEN METABOLIC PANEL: CPT

## 2024-09-02 PROCEDURE — 87086 URINE CULTURE/COLONY COUNT: CPT

## 2024-09-02 PROCEDURE — 6370000000 HC RX 637 (ALT 250 FOR IP): Performed by: NURSE PRACTITIONER

## 2024-09-02 PROCEDURE — 99285 EMERGENCY DEPT VISIT HI MDM: CPT

## 2024-09-02 PROCEDURE — 72131 CT LUMBAR SPINE W/O DYE: CPT

## 2024-09-02 PROCEDURE — 6360000002 HC RX W HCPCS: Performed by: NURSE PRACTITIONER

## 2024-09-02 PROCEDURE — 72125 CT NECK SPINE W/O DYE: CPT

## 2024-09-02 PROCEDURE — 81001 URINALYSIS AUTO W/SCOPE: CPT

## 2024-09-02 PROCEDURE — 93005 ELECTROCARDIOGRAM TRACING: CPT | Performed by: NURSE PRACTITIONER

## 2024-09-02 PROCEDURE — 85025 COMPLETE CBC W/AUTO DIFF WBC: CPT

## 2024-09-02 PROCEDURE — 84484 ASSAY OF TROPONIN QUANT: CPT

## 2024-09-02 PROCEDURE — 71045 X-RAY EXAM CHEST 1 VIEW: CPT

## 2024-09-02 PROCEDURE — 96372 THER/PROPH/DIAG INJ SC/IM: CPT

## 2024-09-02 PROCEDURE — 70450 CT HEAD/BRAIN W/O DYE: CPT

## 2024-09-02 PROCEDURE — 83735 ASSAY OF MAGNESIUM: CPT

## 2024-09-02 RX ORDER — ORPHENADRINE CITRATE 100 MG/1
100 TABLET, EXTENDED RELEASE ORAL ONCE
Status: COMPLETED | OUTPATIENT
Start: 2024-09-02 | End: 2024-09-02

## 2024-09-02 RX ORDER — METHYLPREDNISOLONE ACETATE 40 MG/ML
40 INJECTION, SUSPENSION INTRA-ARTICULAR; INTRALESIONAL; INTRAMUSCULAR; SOFT TISSUE ONCE
Status: COMPLETED | OUTPATIENT
Start: 2024-09-02 | End: 2024-09-02

## 2024-09-02 RX ADMIN — METHYLPREDNISOLONE ACETATE 40 MG: 40 INJECTION, SUSPENSION INTRA-ARTICULAR; INTRALESIONAL; INTRAMUSCULAR; INTRASYNOVIAL; SOFT TISSUE at 13:24

## 2024-09-02 RX ADMIN — ORPHENADRINE CITRATE 100 MG: 100 TABLET, EXTENDED RELEASE ORAL at 13:24

## 2024-09-02 ASSESSMENT — ENCOUNTER SYMPTOMS: BACK PAIN: 1

## 2024-09-02 NOTE — ED PROVIDER NOTES
[facility] ED  EMERGENCY DEPARTMENT ENCOUNTER      Pt Name: Toshia South  MRN: [id number]  Birthdate 1945  Date of evaluation: 9/2/2024  Provider: RYAN Alegria - CNP  9:47 PM    CHIEF COMPLAINT       Chief Complaint   Patient presents with    Head Injury         HISTORY OF PRESENT ILLNESS        Toshia South 79-year-old female presents from home accompanied by  and son to ED report of fall backwards while getting ready this am.Unsure why she fell. Occipital head contusion, posterior neck pain, continued lumbar pain with radiation to left leg.     PMH: Atrial fibs, anxiety, biventricular congestive heart failure, bronchiectasis with acute exacerbation, CAD, chronic pain syndrome, COPD, depression, GERD, hypothyroidism, impaired fasting glucose, iron deficiency anemia, osteoporosis, pulmonary fibrosis, subdural hematoma history of    The history is provided by the patient and a relative. No  was used.       Nursing Notes were reviewed.    REVIEW OF SYSTEMS       Review of Systems   HENT:          Closed head injury   Musculoskeletal:  Positive for arthralgias, back pain, myalgias and neck pain.        Posterior cervical and posterior lumbar pain   All other systems reviewed and are negative.      Except as noted above the remainder of the review of systems was reviewed and negative.       PAST MEDICAL HISTORY     Past Medical History:   Diagnosis Date    A-fib (HCC)     Anxiety     Atrial fibrillation (HCC)     Biventricular congestive heart failure (HCC)     Bronchiectasis with acute exacerbation (HCC) 04/29/2022    CAD (coronary artery disease)     Chronic pain syndrome     dilaudid infusion pump    COPD (chronic obstructive pulmonary disease) (HCC)     Depression     GERD (gastroesophageal reflux disease)     Hypothyroidism     Impaired fasting glucose     Iron deficiency anemia     Osteoporosis     Pulmonary fibrosis (HCC) 04/29/2022    Subdural hematoma

## 2024-09-02 NOTE — DISCHARGE INSTRUCTIONS
Continue fluid  Continue home medication  Norflex 100 mg 1 by mouth twice daily for up to 10 days for pain or muscle spasm  Next dose at 11 PM tonight  Tylenol as needed for comfort    You received Depo-Medrol 40 mg IM x 1-a steroid which may help with symptoms for up to 3 weeks and Norflex 100 mg 1 by mouth a muscle relaxant which may help for 12 hours.

## 2024-09-03 LAB
EKG ATRIAL RATE: 74 BPM
EKG P AXIS: 50 DEGREES
EKG P-R INTERVAL: 184 MS
EKG Q-T INTERVAL: 376 MS
EKG QRS DURATION: 82 MS
EKG QTC CALCULATION (BAZETT): 417 MS
EKG R AXIS: -4 DEGREES
EKG T AXIS: 43 DEGREES
EKG VENTRICULAR RATE: 74 BPM

## 2024-09-03 PROCEDURE — 93010 ELECTROCARDIOGRAM REPORT: CPT | Performed by: FAMILY MEDICINE

## 2024-09-04 ENCOUNTER — HOSPITAL ENCOUNTER (OUTPATIENT)
Dept: SPEECH THERAPY | Age: 79
Setting detail: THERAPIES SERIES
Discharge: HOME | End: 2024-09-04
Payer: MEDICARE

## 2024-09-04 LAB
MICROORGANISM SPEC CULT: NORMAL
SERVICE CMNT-IMP: NORMAL
SPECIMEN DESCRIPTION: NORMAL

## 2024-09-06 ENCOUNTER — HOSPITAL ENCOUNTER (OUTPATIENT)
Dept: SPEECH THERAPY | Age: 79
Setting detail: THERAPIES SERIES
Discharge: HOME | End: 2024-09-06
Payer: MEDICARE

## 2024-09-06 PROCEDURE — 92526 ORAL FUNCTION THERAPY: CPT

## 2024-09-06 NOTE — PROGRESS NOTES
utilize appropriate swallowing strategies in 100% of opportunities.  [x]Met  []Partially met  []Not met   Goal 3: Patient will trial thin liquids without overt s/sx of aspiration or penetration in 80% of opportunities.       Patient drank thin liquids without overt s/sx of aspiration or penetration with 6oz of thin water.         Patient did not wish to trial regular solids consistencies this date) [x]Met  []Partially met  []Not met     LONG TERM GOALS/ TREATMENT SESSION:  Goal 1: Patient will improve PAS from 2 to 1 on repeat MBS following dysphagia treatment. Goal progressing. See STG data   []Met  [x]Partially met  []Not met       EDUCATION/HOME EXERCISE PROGRAM (HEP)  New Education/HEP provided to patient/family/caregiver: Educated on POC, diet recommendations and compensatory swallowing strategies.     Method of Education:     [x]Discussion     []Demonstration    [] Written     []Other  Evaluation of Patient’s Response to Education:         [x]Patient and or caregiver verbalized understanding  []Patient and or Caregiver Demonstrated without assistance   []Patient and or Caregiver Demonstrated with assistance  []Needs additional instruction to demonstrate understanding of education    ASSESSMENT  Patient tolerated today’s treatment session:    [x] Good   []  Fair   []  Poor  Limitations/difficulties with treatment session due to:   []Pain     []Fatigue     []Other medical complications     []Other    Comments:/    PLAN  [x]Continue with current plan of care  []Medical “Hold”  []I“Hold” per patient request  [] Change Treatment plan:  [] Insurance hold  __ Other    Minutes Tracking:  SLP Individual Minutes  Time In: 1045  Time Out: 1120  Minutes: 35    Charges: 1  Electronically signed by:    Hermila Anthony M.S., CCC-SLP              Date:9/6/2024

## 2024-09-15 ENCOUNTER — HOSPITAL ENCOUNTER (INPATIENT)
Age: 79
LOS: 4 days | Discharge: HOME | End: 2024-09-19
Attending: EMERGENCY MEDICINE | Admitting: INTERNAL MEDICINE
Payer: MEDICARE

## 2024-09-15 ENCOUNTER — APPOINTMENT (OUTPATIENT)
Dept: GENERAL RADIOLOGY | Age: 79
End: 2024-09-15
Payer: MEDICARE

## 2024-09-15 DIAGNOSIS — J96.21 ACUTE ON CHRONIC RESPIRATORY FAILURE WITH HYPOXIA AND HYPERCAPNIA: ICD-10-CM

## 2024-09-15 DIAGNOSIS — J18.9 PNEUMONIA DUE TO INFECTIOUS ORGANISM, UNSPECIFIED LATERALITY, UNSPECIFIED PART OF LUNG: Primary | ICD-10-CM

## 2024-09-15 DIAGNOSIS — J96.22 ACUTE ON CHRONIC RESPIRATORY FAILURE WITH HYPOXIA AND HYPERCAPNIA: ICD-10-CM

## 2024-09-15 DIAGNOSIS — G47.33 OSA AND COPD OVERLAP SYNDROME (HCC): ICD-10-CM

## 2024-09-15 DIAGNOSIS — J44.9 OSA AND COPD OVERLAP SYNDROME (HCC): ICD-10-CM

## 2024-09-15 PROBLEM — D68.69 SECONDARY HYPERCOAGULABLE STATE (HCC): Status: ACTIVE | Noted: 2024-09-15

## 2024-09-15 LAB
ALBUMIN SERPL-MCNC: 4 G/DL (ref 3.5–5.2)
ALBUMIN/GLOB SERPL: 1.3 {RATIO} (ref 1–2.5)
ALP SERPL-CCNC: 87 U/L (ref 35–104)
ALT SERPL-CCNC: 14 U/L (ref 10–35)
ANION GAP SERPL CALCULATED.3IONS-SCNC: 11 MMOL/L (ref 9–16)
ARTERIAL PATENCY WRIST A: YES
ARTERIAL PATENCY WRIST A: YES
AST SERPL-CCNC: 17 U/L (ref 10–35)
B PARAP IS1001 DNA NPH QL NAA+NON-PROBE: NOT DETECTED
B PERT DNA SPEC QL NAA+PROBE: NOT DETECTED
BASOPHILS # BLD: <0.03 K/UL (ref 0–0.2)
BASOPHILS NFR BLD: 0 % (ref 0–2)
BDY SITE: ABNORMAL
BDY SITE: ABNORMAL
BILIRUB SERPL-MCNC: 0.5 MG/DL (ref 0–1.2)
BILIRUB UR QL STRIP: NEGATIVE
BNP SERPL-MCNC: 240 PG/ML (ref 0–450)
BODY TEMPERATURE: 37
BODY TEMPERATURE: 39
BUN SERPL-MCNC: 14 MG/DL (ref 8–23)
BUN/CREAT SERPL: 28 (ref 9–20)
C PNEUM DNA NPH QL NAA+NON-PROBE: NOT DETECTED
CALCIUM SERPL-MCNC: 9.7 MG/DL (ref 8.6–10.4)
CHLORIDE SERPL-SCNC: 100 MMOL/L (ref 98–107)
CLARITY UR: CLEAR
CO2 SERPL-SCNC: 30 MMOL/L (ref 20–31)
COLOR UR: YELLOW
CREAT SERPL-MCNC: 0.5 MG/DL (ref 0.5–0.9)
EKG ATRIAL RATE: 123 BPM
EKG P AXIS: 53 DEGREES
EKG P-R INTERVAL: 146 MS
EKG Q-T INTERVAL: 308 MS
EKG QRS DURATION: 78 MS
EKG QTC CALCULATION (BAZETT): 440 MS
EKG R AXIS: -17 DEGREES
EKG T AXIS: 76 DEGREES
EKG VENTRICULAR RATE: 123 BPM
EOSINOPHIL # BLD: <0.03 K/UL (ref 0–0.44)
EOSINOPHILS RELATIVE PERCENT: 1 % (ref 1–4)
ERYTHROCYTE [DISTWIDTH] IN BLOOD BY AUTOMATED COUNT: 14.1 % (ref 11.8–14.4)
FIO2 ON VENT: 50 %
FIO2 ON VENT: 60 %
FLUAV RNA NPH QL NAA+NON-PROBE: NOT DETECTED
FLUBV RNA NPH QL NAA+NON-PROBE: NOT DETECTED
GAS FLOW.O2 O2 DELIVERY SYS: ABNORMAL L/MIN
GAS FLOW.O2 O2 DELIVERY SYS: ABNORMAL L/MIN
GFR, ESTIMATED: >90 ML/MIN/1.73M2
GLUCOSE SERPL-MCNC: 105 MG/DL (ref 74–99)
GLUCOSE UR STRIP-MCNC: NEGATIVE MG/DL
HADV DNA NPH QL NAA+NON-PROBE: NOT DETECTED
HCO3 ARTERIAL: 24.5 MMOL/L (ref 22–26)
HCO3 ARTERIAL: 29 MMOL/L (ref 22–26)
HCOV 229E RNA NPH QL NAA+NON-PROBE: NOT DETECTED
HCOV HKU1 RNA NPH QL NAA+NON-PROBE: NOT DETECTED
HCOV NL63 RNA NPH QL NAA+NON-PROBE: NOT DETECTED
HCOV OC43 RNA NPH QL NAA+NON-PROBE: NOT DETECTED
HCT VFR BLD AUTO: 46.8 % (ref 36.3–47.1)
HGB BLD-MCNC: 15.2 G/DL (ref 11.9–15.1)
HGB UR QL STRIP.AUTO: NEGATIVE
HMPV RNA NPH QL NAA+NON-PROBE: NOT DETECTED
HPIV1 RNA NPH QL NAA+NON-PROBE: NOT DETECTED
HPIV2 RNA NPH QL NAA+NON-PROBE: NOT DETECTED
HPIV3 RNA NPH QL NAA+NON-PROBE: NOT DETECTED
HPIV4 RNA NPH QL NAA+NON-PROBE: NOT DETECTED
IMM GRANULOCYTES # BLD AUTO: <0.03 K/UL (ref 0–0.3)
IMM GRANULOCYTES NFR BLD: 0 %
INR PPP: 1.1
KETONES UR STRIP-MCNC: NEGATIVE MG/DL
LACTATE BLDV-SCNC: 2.8 MMOL/L (ref 0.5–1.9)
LACTATE BLDV-SCNC: 3 MMOL/L (ref 0.5–1.9)
LACTATE BLDV-SCNC: 3.5 MMOL/L (ref 0.5–1.9)
LACTATE BLDV-SCNC: 4.5 MMOL/L (ref 0.5–1.9)
LACTATE BLDV-SCNC: 4.6 MMOL/L (ref 0.5–1.9)
LEUKOCYTE ESTERASE UR QL STRIP: NEGATIVE
LYMPHOCYTES NFR BLD: 0.67 K/UL (ref 1.1–3.7)
LYMPHOCYTES RELATIVE PERCENT: 17 % (ref 24–43)
M PNEUMO DNA NPH QL NAA+NON-PROBE: NOT DETECTED
MCH RBC QN AUTO: 31.7 PG (ref 25.2–33.5)
MCHC RBC AUTO-ENTMCNC: 32.5 G/DL (ref 28.4–34.8)
MCV RBC AUTO: 97.7 FL (ref 82.6–102.9)
MONOCYTES NFR BLD: 0.1 K/UL (ref 0.1–1.2)
MONOCYTES NFR BLD: 3 % (ref 3–12)
NEGATIVE BASE EXCESS, ART: 1.2 MMOL/L (ref 0–2)
NEUTROPHILS NFR BLD: 79 % (ref 36–65)
NEUTS SEG NFR BLD: 3.1 K/UL (ref 1.5–8.1)
NITRITE UR QL STRIP: NEGATIVE
NRBC BLD-RTO: 0 PER 100 WBC
O2 SAT, ARTERIAL: 92.2 % (ref 95–98)
O2 SAT, ARTERIAL: 97.3 % (ref 95–98)
PCO2 ARTERIAL: 44.4 MMHG (ref 35–45)
PCO2 ARTERIAL: 47.1 MMHG (ref 35–45)
PCO2, ART, TEMP ADJ: 44.4 (ref 35–45)
PCO2, ART, TEMP ADJ: 51.4 (ref 35–45)
PH ARTERIAL: 7.36 (ref 7.35–7.45)
PH ARTERIAL: 7.41 (ref 7.35–7.45)
PH UR STRIP: 6 [PH] (ref 5–9)
PH, ART, TEMP ADJ: 7.36 (ref 7.35–7.45)
PH, ART, TEMP ADJ: 7.38 (ref 7.35–7.45)
PLATELET # BLD AUTO: 231 K/UL (ref 138–453)
PMV BLD AUTO: 9.1 FL (ref 8.1–13.5)
PO2 ARTERIAL: 65.9 MMHG (ref 80–100)
PO2 ARTERIAL: 95.9 MMHG (ref 80–100)
PO2, ART, TEMP ADJ: 108.4 MMHG (ref 80–100)
PO2, ART, TEMP ADJ: 65.9 MMHG (ref 80–100)
POSITIVE BASE EXCESS, ART: 3.5 MMOL/L (ref 0–2)
POTASSIUM SERPL-SCNC: 3.3 MMOL/L (ref 3.7–5.3)
PROCALCITONIN SERPL-MCNC: 0.14 NG/ML (ref 0–0.09)
PROT SERPL-MCNC: 7.1 G/DL (ref 6.6–8.7)
PROT UR STRIP-MCNC: NEGATIVE MG/DL
PROTHROMBIN TIME: 13.5 SEC (ref 11.7–14.1)
RBC # BLD AUTO: 4.79 M/UL (ref 3.95–5.11)
RSV RNA NPH QL NAA+NON-PROBE: NOT DETECTED
RV+EV RNA NPH QL NAA+NON-PROBE: NOT DETECTED
SARS-COV-2 RDRP RESP QL NAA+PROBE: NOT DETECTED
SARS-COV-2 RNA NPH QL NAA+NON-PROBE: NOT DETECTED
SODIUM SERPL-SCNC: 141 MMOL/L (ref 136–145)
SP GR UR STRIP: 1.02 (ref 1.01–1.02)
SPECIMEN DESCRIPTION: NORMAL
SPECIMEN DESCRIPTION: NORMAL
TROPONIN I SERPL HS-MCNC: 14 NG/L (ref 0–14)
UROBILINOGEN UR STRIP-ACNC: NORMAL EU/DL (ref 0–1)
WBC OTHER # BLD: 3.9 K/UL (ref 3.5–11.3)

## 2024-09-15 PROCEDURE — 81003 URINALYSIS AUTO W/O SCOPE: CPT

## 2024-09-15 PROCEDURE — 36415 COLL VENOUS BLD VENIPUNCTURE: CPT

## 2024-09-15 PROCEDURE — 94640 AIRWAY INHALATION TREATMENT: CPT

## 2024-09-15 PROCEDURE — 71045 X-RAY EXAM CHEST 1 VIEW: CPT

## 2024-09-15 PROCEDURE — 96365 THER/PROPH/DIAG IV INF INIT: CPT

## 2024-09-15 PROCEDURE — 0202U NFCT DS 22 TRGT SARS-COV-2: CPT

## 2024-09-15 PROCEDURE — 94664 DEMO&/EVAL PT USE INHALER: CPT

## 2024-09-15 PROCEDURE — 84484 ASSAY OF TROPONIN QUANT: CPT

## 2024-09-15 PROCEDURE — 36600 WITHDRAWAL OF ARTERIAL BLOOD: CPT

## 2024-09-15 PROCEDURE — 99285 EMERGENCY DEPT VISIT HI MDM: CPT

## 2024-09-15 PROCEDURE — 94761 N-INVAS EAR/PLS OXIMETRY MLT: CPT

## 2024-09-15 PROCEDURE — 5A09457 ASSISTANCE WITH RESPIRATORY VENTILATION, 24-96 CONSECUTIVE HOURS, CONTINUOUS POSITIVE AIRWAY PRESSURE: ICD-10-PCS | Performed by: INTERNAL MEDICINE

## 2024-09-15 PROCEDURE — 6370000000 HC RX 637 (ALT 250 FOR IP): Performed by: EMERGENCY MEDICINE

## 2024-09-15 PROCEDURE — 2580000003 HC RX 258: Performed by: EMERGENCY MEDICINE

## 2024-09-15 PROCEDURE — 82805 BLOOD GASES W/O2 SATURATION: CPT

## 2024-09-15 PROCEDURE — 2000000000 HC ICU R&B

## 2024-09-15 PROCEDURE — 2580000003 HC RX 258: Performed by: STUDENT IN AN ORGANIZED HEALTH CARE EDUCATION/TRAINING PROGRAM

## 2024-09-15 PROCEDURE — 2500000003 HC RX 250 WO HCPCS: Performed by: EMERGENCY MEDICINE

## 2024-09-15 PROCEDURE — 87086 URINE CULTURE/COLONY COUNT: CPT

## 2024-09-15 PROCEDURE — 93005 ELECTROCARDIOGRAM TRACING: CPT | Performed by: EMERGENCY MEDICINE

## 2024-09-15 PROCEDURE — 83880 ASSAY OF NATRIURETIC PEPTIDE: CPT

## 2024-09-15 PROCEDURE — 6360000002 HC RX W HCPCS: Performed by: STUDENT IN AN ORGANIZED HEALTH CARE EDUCATION/TRAINING PROGRAM

## 2024-09-15 PROCEDURE — 87154 CUL TYP ID BLD PTHGN 6+ TRGT: CPT

## 2024-09-15 PROCEDURE — 6360000002 HC RX W HCPCS: Performed by: EMERGENCY MEDICINE

## 2024-09-15 PROCEDURE — 87040 BLOOD CULTURE FOR BACTERIA: CPT

## 2024-09-15 PROCEDURE — 85025 COMPLETE CBC W/AUTO DIFF WBC: CPT

## 2024-09-15 PROCEDURE — 94660 CPAP INITIATION&MGMT: CPT

## 2024-09-15 PROCEDURE — 96375 TX/PRO/DX INJ NEW DRUG ADDON: CPT

## 2024-09-15 PROCEDURE — 87635 SARS-COV-2 COVID-19 AMP PRB: CPT

## 2024-09-15 PROCEDURE — 2700000000 HC OXYGEN THERAPY PER DAY

## 2024-09-15 PROCEDURE — 83605 ASSAY OF LACTIC ACID: CPT

## 2024-09-15 PROCEDURE — 93010 ELECTROCARDIOGRAM REPORT: CPT | Performed by: INTERNAL MEDICINE

## 2024-09-15 PROCEDURE — 6360000002 HC RX W HCPCS: Performed by: INTERNAL MEDICINE

## 2024-09-15 PROCEDURE — 84145 PROCALCITONIN (PCT): CPT

## 2024-09-15 PROCEDURE — 87186 SC STD MICRODIL/AGAR DIL: CPT

## 2024-09-15 PROCEDURE — 85610 PROTHROMBIN TIME: CPT

## 2024-09-15 PROCEDURE — 80053 COMPREHEN METABOLIC PANEL: CPT

## 2024-09-15 PROCEDURE — 87205 SMEAR GRAM STAIN: CPT

## 2024-09-15 RX ORDER — ALBUTEROL SULFATE 0.83 MG/ML
2.5 SOLUTION RESPIRATORY (INHALATION)
Status: DISCONTINUED | OUTPATIENT
Start: 2024-09-15 | End: 2024-09-15

## 2024-09-15 RX ORDER — SODIUM CHLORIDE 0.9 % (FLUSH) 0.9 %
5-40 SYRINGE (ML) INJECTION EVERY 12 HOURS SCHEDULED
Status: DISCONTINUED | OUTPATIENT
Start: 2024-09-15 | End: 2024-09-19 | Stop reason: HOSPADM

## 2024-09-15 RX ORDER — DULOXETIN HYDROCHLORIDE 60 MG/1
60 CAPSULE, DELAYED RELEASE ORAL DAILY
Status: DISCONTINUED | OUTPATIENT
Start: 2024-09-16 | End: 2024-09-19 | Stop reason: HOSPADM

## 2024-09-15 RX ORDER — ONDANSETRON 4 MG/1
4 TABLET, ORALLY DISINTEGRATING ORAL EVERY 8 HOURS PRN
Status: DISCONTINUED | OUTPATIENT
Start: 2024-09-15 | End: 2024-09-19 | Stop reason: HOSPADM

## 2024-09-15 RX ORDER — SODIUM CHLORIDE 0.9 % (FLUSH) 0.9 %
5-40 SYRINGE (ML) INJECTION PRN
Status: DISCONTINUED | OUTPATIENT
Start: 2024-09-15 | End: 2024-09-19 | Stop reason: HOSPADM

## 2024-09-15 RX ORDER — LEVOTHYROXINE SODIUM 100 UG/1
200 TABLET ORAL DAILY
Status: DISCONTINUED | OUTPATIENT
Start: 2024-09-16 | End: 2024-09-19 | Stop reason: HOSPADM

## 2024-09-15 RX ORDER — CITALOPRAM HYDROBROMIDE 20 MG/1
40 TABLET ORAL DAILY
Status: DISCONTINUED | OUTPATIENT
Start: 2024-09-16 | End: 2024-09-19 | Stop reason: HOSPADM

## 2024-09-15 RX ORDER — ASPIRIN 81 MG/1
81 TABLET ORAL DAILY
Status: DISCONTINUED | OUTPATIENT
Start: 2024-09-15 | End: 2024-09-19 | Stop reason: HOSPADM

## 2024-09-15 RX ORDER — ACETAMINOPHEN 650 MG/1
650 SUPPOSITORY RECTAL EVERY 6 HOURS PRN
Status: DISCONTINUED | OUTPATIENT
Start: 2024-09-15 | End: 2024-09-19 | Stop reason: HOSPADM

## 2024-09-15 RX ORDER — GUAIFENESIN 600 MG/1
600 TABLET, EXTENDED RELEASE ORAL 2 TIMES DAILY
Status: DISCONTINUED | OUTPATIENT
Start: 2024-09-15 | End: 2024-09-19 | Stop reason: HOSPADM

## 2024-09-15 RX ORDER — BENZONATATE 100 MG/1
100 CAPSULE ORAL 3 TIMES DAILY PRN
Status: DISCONTINUED | OUTPATIENT
Start: 2024-09-15 | End: 2024-09-19 | Stop reason: HOSPADM

## 2024-09-15 RX ORDER — 0.9 % SODIUM CHLORIDE 0.9 %
30 INTRAVENOUS SOLUTION INTRAVENOUS ONCE
Status: COMPLETED | OUTPATIENT
Start: 2024-09-15 | End: 2024-09-15

## 2024-09-15 RX ORDER — ASCORBIC ACID 500 MG
500 TABLET ORAL 2 TIMES DAILY
Status: DISCONTINUED | OUTPATIENT
Start: 2024-09-15 | End: 2024-09-19 | Stop reason: HOSPADM

## 2024-09-15 RX ORDER — ALBUTEROL SULFATE 0.83 MG/ML
2.5 SOLUTION RESPIRATORY (INHALATION) EVERY 4 HOURS
Status: DISCONTINUED | OUTPATIENT
Start: 2024-09-15 | End: 2024-09-16

## 2024-09-15 RX ORDER — ALBUTEROL SULFATE 0.83 MG/ML
2.5 SOLUTION RESPIRATORY (INHALATION)
Status: DISCONTINUED | OUTPATIENT
Start: 2024-09-15 | End: 2024-09-19 | Stop reason: HOSPADM

## 2024-09-15 RX ORDER — SODIUM CHLORIDE 9 MG/ML
INJECTION, SOLUTION INTRAVENOUS PRN
Status: DISCONTINUED | OUTPATIENT
Start: 2024-09-15 | End: 2024-09-19 | Stop reason: HOSPADM

## 2024-09-15 RX ORDER — GUAIFENESIN/DEXTROMETHORPHAN 100-10MG/5
5 SYRUP ORAL EVERY 4 HOURS PRN
Status: DISCONTINUED | OUTPATIENT
Start: 2024-09-15 | End: 2024-09-19 | Stop reason: HOSPADM

## 2024-09-15 RX ORDER — POLYETHYLENE GLYCOL 3350 17 G/17G
17 POWDER, FOR SOLUTION ORAL DAILY PRN
Status: DISCONTINUED | OUTPATIENT
Start: 2024-09-15 | End: 2024-09-19 | Stop reason: HOSPADM

## 2024-09-15 RX ORDER — ONDANSETRON 2 MG/ML
4 INJECTION INTRAMUSCULAR; INTRAVENOUS EVERY 6 HOURS PRN
Status: DISCONTINUED | OUTPATIENT
Start: 2024-09-15 | End: 2024-09-19 | Stop reason: HOSPADM

## 2024-09-15 RX ORDER — IPRATROPIUM BROMIDE AND ALBUTEROL SULFATE 2.5; .5 MG/3ML; MG/3ML
1 SOLUTION RESPIRATORY (INHALATION) ONCE
Status: COMPLETED | OUTPATIENT
Start: 2024-09-15 | End: 2024-09-15

## 2024-09-15 RX ORDER — SODIUM CHLORIDE 9 MG/ML
INJECTION, SOLUTION INTRAVENOUS CONTINUOUS
Status: DISCONTINUED | OUTPATIENT
Start: 2024-09-15 | End: 2024-09-17

## 2024-09-15 RX ORDER — PREGABALIN 75 MG/1
300 CAPSULE ORAL 2 TIMES DAILY
Status: DISCONTINUED | OUTPATIENT
Start: 2024-09-16 | End: 2024-09-15

## 2024-09-15 RX ORDER — 0.9 % SODIUM CHLORIDE 0.9 %
1000 INTRAVENOUS SOLUTION INTRAVENOUS ONCE
Status: COMPLETED | OUTPATIENT
Start: 2024-09-15 | End: 2024-09-15

## 2024-09-15 RX ORDER — TRAZODONE HYDROCHLORIDE 50 MG/1
100 TABLET, FILM COATED ORAL NIGHTLY PRN
Status: DISCONTINUED | OUTPATIENT
Start: 2024-09-15 | End: 2024-09-19 | Stop reason: HOSPADM

## 2024-09-15 RX ORDER — BUMETANIDE 1 MG/1
2 TABLET ORAL DAILY PRN
Status: DISCONTINUED | OUTPATIENT
Start: 2024-09-15 | End: 2024-09-19 | Stop reason: HOSPADM

## 2024-09-15 RX ORDER — M-VIT,TX,IRON,MINS/CALC/FOLIC 27MG-0.4MG
1 TABLET ORAL DAILY
Status: DISCONTINUED | OUTPATIENT
Start: 2024-09-16 | End: 2024-09-19 | Stop reason: HOSPADM

## 2024-09-15 RX ORDER — POTASSIUM CHLORIDE 1500 MG/1
20 TABLET, EXTENDED RELEASE ORAL 2 TIMES DAILY
Status: DISCONTINUED | OUTPATIENT
Start: 2024-09-16 | End: 2024-09-19 | Stop reason: HOSPADM

## 2024-09-15 RX ORDER — IPRATROPIUM BROMIDE AND ALBUTEROL SULFATE 2.5; .5 MG/3ML; MG/3ML
1 SOLUTION RESPIRATORY (INHALATION) EVERY 4 HOURS PRN
Status: DISCONTINUED | OUTPATIENT
Start: 2024-09-15 | End: 2024-09-19 | Stop reason: HOSPADM

## 2024-09-15 RX ORDER — PREGABALIN 75 MG/1
300 CAPSULE ORAL 2 TIMES DAILY
Status: DISCONTINUED | OUTPATIENT
Start: 2024-09-15 | End: 2024-09-19 | Stop reason: HOSPADM

## 2024-09-15 RX ORDER — FAMOTIDINE 20 MG/1
20 TABLET, FILM COATED ORAL 2 TIMES DAILY
Status: DISCONTINUED | OUTPATIENT
Start: 2024-09-15 | End: 2024-09-19 | Stop reason: HOSPADM

## 2024-09-15 RX ORDER — SERTRALINE HYDROCHLORIDE 25 MG/1
50 TABLET, FILM COATED ORAL DAILY
Status: DISCONTINUED | OUTPATIENT
Start: 2024-09-15 | End: 2024-09-19 | Stop reason: HOSPADM

## 2024-09-15 RX ORDER — DOXYCYCLINE 100 MG/1
100 CAPSULE ORAL EVERY 12 HOURS SCHEDULED
Status: DISCONTINUED | OUTPATIENT
Start: 2024-09-15 | End: 2024-09-17

## 2024-09-15 RX ORDER — ACETAMINOPHEN 325 MG/1
650 TABLET ORAL EVERY 6 HOURS PRN
Status: DISCONTINUED | OUTPATIENT
Start: 2024-09-15 | End: 2024-09-19 | Stop reason: HOSPADM

## 2024-09-15 RX ORDER — GUAIFENESIN 600 MG/1
600 TABLET, EXTENDED RELEASE ORAL 2 TIMES DAILY
Status: DISCONTINUED | OUTPATIENT
Start: 2024-09-15 | End: 2024-09-15 | Stop reason: SDUPTHER

## 2024-09-15 RX ADMIN — ALBUTEROL SULFATE 2.5 MG: 2.5 SOLUTION RESPIRATORY (INHALATION) at 16:16

## 2024-09-15 RX ADMIN — SODIUM CHLORIDE 1836 ML: 9 INJECTION, SOLUTION INTRAVENOUS at 11:33

## 2024-09-15 RX ADMIN — SODIUM CHLORIDE 1000 ML: 9 INJECTION, SOLUTION INTRAVENOUS at 16:19

## 2024-09-15 RX ADMIN — DOXYCYCLINE 100 MG: 100 INJECTION, POWDER, LYOPHILIZED, FOR SOLUTION INTRAVENOUS at 12:02

## 2024-09-15 RX ADMIN — IPRATROPIUM BROMIDE AND ALBUTEROL SULFATE 1 DOSE: 2.5; .5 SOLUTION RESPIRATORY (INHALATION) at 11:18

## 2024-09-15 RX ADMIN — SODIUM CHLORIDE, PRESERVATIVE FREE 10 ML: 5 INJECTION INTRAVENOUS at 23:13

## 2024-09-15 RX ADMIN — CEFTRIAXONE SODIUM 1000 MG: 1 INJECTION, POWDER, FOR SOLUTION INTRAMUSCULAR; INTRAVENOUS at 11:19

## 2024-09-15 RX ADMIN — SODIUM CHLORIDE 1000 ML: 9 INJECTION, SOLUTION INTRAVENOUS at 20:23

## 2024-09-15 RX ADMIN — SODIUM CHLORIDE: 9 INJECTION, SOLUTION INTRAVENOUS at 14:15

## 2024-09-15 RX ADMIN — ALBUTEROL SULFATE 2.5 MG: 2.5 SOLUTION RESPIRATORY (INHALATION) at 20:38

## 2024-09-15 ASSESSMENT — PAIN DESCRIPTION - ORIENTATION: ORIENTATION: LEFT

## 2024-09-15 ASSESSMENT — PAIN DESCRIPTION - PAIN TYPE: TYPE: CHRONIC PAIN

## 2024-09-15 ASSESSMENT — PAIN DESCRIPTION - LOCATION: LOCATION: BACK;LEG

## 2024-09-15 ASSESSMENT — PAIN SCALES - GENERAL
PAINLEVEL_OUTOF10: 7
PAINLEVEL_OUTOF10: 0

## 2024-09-15 ASSESSMENT — PAIN DESCRIPTION - FREQUENCY: FREQUENCY: INTERMITTENT

## 2024-09-15 ASSESSMENT — PAIN DESCRIPTION - ONSET: ONSET: ON-GOING

## 2024-09-16 PROBLEM — S06.2XAA MIDLINE SHIFT OF BRAIN DUE TO HEMATOMA: Status: RESOLVED | Noted: 2022-08-23 | Resolved: 2024-09-16

## 2024-09-16 PROBLEM — J96.02 ACUTE RESPIRATORY FAILURE WITH HYPOXIA AND HYPERCAPNIA: Status: RESOLVED | Noted: 2023-06-29 | Resolved: 2024-09-16

## 2024-09-16 PROBLEM — E44.1 MILD MALNUTRITION (HCC): Status: RESOLVED | Noted: 2022-04-29 | Resolved: 2024-09-16

## 2024-09-16 PROBLEM — E44.0 MODERATE MALNUTRITION (HCC): Status: ACTIVE | Noted: 2024-09-16

## 2024-09-16 PROBLEM — A41.9 SEPTIC SHOCK (HCC): Status: RESOLVED | Noted: 2020-08-16 | Resolved: 2024-09-16

## 2024-09-16 PROBLEM — J96.01 ACUTE RESPIRATORY FAILURE WITH HYPOXIA AND HYPERCAPNIA: Status: RESOLVED | Noted: 2023-06-29 | Resolved: 2024-09-16

## 2024-09-16 PROBLEM — G93.89 MIDLINE SHIFT OF BRAIN DUE TO HEMATOMA: Status: RESOLVED | Noted: 2022-08-23 | Resolved: 2024-09-16

## 2024-09-16 PROBLEM — J18.9 ACUTE PNEUMONIA: Status: RESOLVED | Noted: 2024-09-15 | Resolved: 2024-09-16

## 2024-09-16 PROBLEM — S06.A0XA MIDLINE SHIFT OF BRAIN DUE TO HEMATOMA: Status: RESOLVED | Noted: 2022-08-23 | Resolved: 2024-09-16

## 2024-09-16 PROBLEM — R65.21 SEPTIC SHOCK (HCC): Status: RESOLVED | Noted: 2020-08-16 | Resolved: 2024-09-16

## 2024-09-16 PROBLEM — S06.2X0S MIDLINE SHIFT OF BRAIN DUE TO HEMATOMA (HCC): Status: RESOLVED | Noted: 2022-08-23 | Resolved: 2024-09-16

## 2024-09-16 PROBLEM — R06.00 DYSPNEA, UNSPECIFIED: Status: RESOLVED | Noted: 2021-10-14 | Resolved: 2024-09-16

## 2024-09-16 LAB
ALBUMIN SERPL-MCNC: 3.2 G/DL (ref 3.5–5.2)
ALBUMIN/GLOB SERPL: 1.3 {RATIO} (ref 1–2.5)
ALP SERPL-CCNC: 50 U/L (ref 35–104)
ALT SERPL-CCNC: 12 U/L (ref 10–35)
ANION GAP SERPL CALCULATED.3IONS-SCNC: 10 MMOL/L (ref 9–16)
ARTERIAL PATENCY WRIST A: YES
AST SERPL-CCNC: 30 U/L (ref 10–35)
BASOPHILS # BLD: 0 K/UL (ref 0–0.2)
BASOPHILS NFR BLD: 0 % (ref 0–2)
BDY SITE: ABNORMAL
BILIRUB SERPL-MCNC: 0.5 MG/DL (ref 0–1.2)
BODY TEMPERATURE: 37
BUN SERPL-MCNC: 14 MG/DL (ref 8–23)
BUN/CREAT SERPL: 35 (ref 9–20)
CALCIUM SERPL-MCNC: 8.5 MG/DL (ref 8.6–10.4)
CHLORIDE SERPL-SCNC: 104 MMOL/L (ref 98–107)
CO2 SERPL-SCNC: 25 MMOL/L (ref 20–31)
CREAT SERPL-MCNC: 0.4 MG/DL (ref 0.5–0.9)
CRP SERPL HS-MCNC: 201 MG/L (ref 0–5)
EOSINOPHIL # BLD: 0 K/UL (ref 0–0.44)
EOSINOPHILS RELATIVE PERCENT: 0 % (ref 1–4)
ERYTHROCYTE [DISTWIDTH] IN BLOOD BY AUTOMATED COUNT: 14.3 % (ref 11.8–14.4)
FIO2 ON VENT: 40 %
GAS FLOW.O2 O2 DELIVERY SYS: ABNORMAL L/MIN
GFR, ESTIMATED: >90 ML/MIN/1.73M2
GLUCOSE SERPL-MCNC: 116 MG/DL (ref 74–99)
HCO3 ARTERIAL: 23.8 MMOL/L (ref 22–26)
HCT VFR BLD AUTO: 36.6 % (ref 36.3–47.1)
HGB BLD-MCNC: 12 G/DL (ref 11.9–15.1)
IMM GRANULOCYTES # BLD AUTO: 0.1 K/UL (ref 0–0.3)
IMM GRANULOCYTES NFR BLD: 1 %
LACTATE BLDV-SCNC: 3.1 MMOL/L (ref 0.5–1.9)
LACTATE BLDV-SCNC: 3.5 MMOL/L (ref 0.5–1.9)
LYMPHOCYTES NFR BLD: 0.77 K/UL (ref 1.1–3.7)
LYMPHOCYTES RELATIVE PERCENT: 8 % (ref 24–43)
MAGNESIUM SERPL-MCNC: 1.3 MG/DL (ref 1.6–2.4)
MCH RBC QN AUTO: 31.7 PG (ref 25.2–33.5)
MCHC RBC AUTO-ENTMCNC: 32.8 G/DL (ref 28.4–34.8)
MCV RBC AUTO: 96.8 FL (ref 82.6–102.9)
MONOCYTES NFR BLD: 0.38 K/UL (ref 0.1–1.2)
MONOCYTES NFR BLD: 4 % (ref 3–12)
MORPHOLOGY: NORMAL
NEGATIVE BASE EXCESS, ART: 0.8 MMOL/L (ref 0–2)
NEUTROPHILS NFR BLD: 87 % (ref 36–65)
NEUTS SEG NFR BLD: 8.35 K/UL (ref 1.5–8.1)
NRBC BLD-RTO: 0 PER 100 WBC
O2 SAT, ARTERIAL: 94.9 % (ref 95–98)
PCO2 ARTERIAL: 39.2 MMHG (ref 35–45)
PCO2, ART, TEMP ADJ: 39.2 (ref 35–45)
PH ARTERIAL: 7.4 (ref 7.35–7.45)
PH, ART, TEMP ADJ: 7.4 (ref 7.35–7.45)
PLATELET # BLD AUTO: 176 K/UL (ref 138–453)
PMV BLD AUTO: 9.3 FL (ref 8.1–13.5)
PO2 ARTERIAL: 73.7 MMHG (ref 80–100)
PO2, ART, TEMP ADJ: 73.7 MMHG (ref 80–100)
POTASSIUM SERPL-SCNC: 3.5 MMOL/L (ref 3.7–5.3)
PROT SERPL-MCNC: 5.7 G/DL (ref 6.6–8.7)
PT. POSITION: ABNORMAL
RBC # BLD AUTO: 3.78 M/UL (ref 3.95–5.11)
SODIUM SERPL-SCNC: 139 MMOL/L (ref 136–145)
WBC OTHER # BLD: 9.6 K/UL (ref 3.5–11.3)

## 2024-09-16 PROCEDURE — 97162 PT EVAL MOD COMPLEX 30 MIN: CPT

## 2024-09-16 PROCEDURE — 6370000000 HC RX 637 (ALT 250 FOR IP)

## 2024-09-16 PROCEDURE — 87205 SMEAR GRAM STAIN: CPT

## 2024-09-16 PROCEDURE — 2000000000 HC ICU R&B

## 2024-09-16 PROCEDURE — 87070 CULTURE OTHR SPECIMN AEROBIC: CPT

## 2024-09-16 PROCEDURE — 6360000002 HC RX W HCPCS

## 2024-09-16 PROCEDURE — 6370000000 HC RX 637 (ALT 250 FOR IP): Performed by: STUDENT IN AN ORGANIZED HEALTH CARE EDUCATION/TRAINING PROGRAM

## 2024-09-16 PROCEDURE — 2700000000 HC OXYGEN THERAPY PER DAY

## 2024-09-16 PROCEDURE — 97110 THERAPEUTIC EXERCISES: CPT

## 2024-09-16 PROCEDURE — 80053 COMPREHEN METABOLIC PANEL: CPT

## 2024-09-16 PROCEDURE — 82805 BLOOD GASES W/O2 SATURATION: CPT

## 2024-09-16 PROCEDURE — 92610 EVALUATE SWALLOWING FUNCTION: CPT

## 2024-09-16 PROCEDURE — 2500000003 HC RX 250 WO HCPCS

## 2024-09-16 PROCEDURE — 36600 WITHDRAWAL OF ARTERIAL BLOOD: CPT

## 2024-09-16 PROCEDURE — 6360000002 HC RX W HCPCS: Performed by: NURSE PRACTITIONER

## 2024-09-16 PROCEDURE — 83735 ASSAY OF MAGNESIUM: CPT

## 2024-09-16 PROCEDURE — 2580000003 HC RX 258

## 2024-09-16 PROCEDURE — 36415 COLL VENOUS BLD VENIPUNCTURE: CPT

## 2024-09-16 PROCEDURE — 6360000002 HC RX W HCPCS: Performed by: STUDENT IN AN ORGANIZED HEALTH CARE EDUCATION/TRAINING PROGRAM

## 2024-09-16 PROCEDURE — 2580000003 HC RX 258: Performed by: STUDENT IN AN ORGANIZED HEALTH CARE EDUCATION/TRAINING PROGRAM

## 2024-09-16 PROCEDURE — 93005 ELECTROCARDIOGRAM TRACING: CPT | Performed by: INTERNAL MEDICINE

## 2024-09-16 PROCEDURE — 83605 ASSAY OF LACTIC ACID: CPT

## 2024-09-16 PROCEDURE — 97116 GAIT TRAINING THERAPY: CPT

## 2024-09-16 PROCEDURE — 94660 CPAP INITIATION&MGMT: CPT

## 2024-09-16 PROCEDURE — 94669 MECHANICAL CHEST WALL OSCILL: CPT

## 2024-09-16 PROCEDURE — 6360000002 HC RX W HCPCS: Performed by: INTERNAL MEDICINE

## 2024-09-16 PROCEDURE — 94640 AIRWAY INHALATION TREATMENT: CPT

## 2024-09-16 PROCEDURE — 86140 C-REACTIVE PROTEIN: CPT

## 2024-09-16 PROCEDURE — 85025 COMPLETE CBC W/AUTO DIFF WBC: CPT

## 2024-09-16 PROCEDURE — 94664 DEMO&/EVAL PT USE INHALER: CPT

## 2024-09-16 PROCEDURE — 97166 OT EVAL MOD COMPLEX 45 MIN: CPT

## 2024-09-16 RX ORDER — DILTIAZEM HYDROCHLORIDE 30 MG/1
30 TABLET, FILM COATED ORAL EVERY 6 HOURS SCHEDULED
Status: DISCONTINUED | OUTPATIENT
Start: 2024-09-16 | End: 2024-09-17

## 2024-09-16 RX ORDER — DILTIAZEM HYDROCHLORIDE 5 MG/ML
10 INJECTION INTRAVENOUS ONCE
Status: COMPLETED | OUTPATIENT
Start: 2024-09-16 | End: 2024-09-16

## 2024-09-16 RX ORDER — MAGNESIUM SULFATE IN WATER 40 MG/ML
2000 INJECTION, SOLUTION INTRAVENOUS ONCE
Status: COMPLETED | OUTPATIENT
Start: 2024-09-16 | End: 2024-09-16

## 2024-09-16 RX ORDER — LEVALBUTEROL 1.25 MG/.5ML
1.25 SOLUTION, CONCENTRATE RESPIRATORY (INHALATION)
Status: DISCONTINUED | OUTPATIENT
Start: 2024-09-16 | End: 2024-09-17

## 2024-09-16 RX ORDER — SODIUM CHLORIDE FOR INHALATION 0.9 %
VIAL, NEBULIZER (ML) INHALATION
Status: DISPENSED
Start: 2024-09-16 | End: 2024-09-17

## 2024-09-16 RX ORDER — ALBUTEROL SULFATE 0.83 MG/ML
2.5 SOLUTION RESPIRATORY (INHALATION)
Status: DISCONTINUED | OUTPATIENT
Start: 2024-09-16 | End: 2024-09-16

## 2024-09-16 RX ORDER — SODIUM CHLORIDE FOR INHALATION 0.9 %
3 VIAL, NEBULIZER (ML) INHALATION EVERY 8 HOURS PRN
Status: DISCONTINUED | OUTPATIENT
Start: 2024-09-16 | End: 2024-09-19 | Stop reason: HOSPADM

## 2024-09-16 RX ORDER — LEVALBUTEROL 1.25 MG/.5ML
SOLUTION, CONCENTRATE RESPIRATORY (INHALATION)
Status: COMPLETED
Start: 2024-09-16 | End: 2024-09-16

## 2024-09-16 RX ADMIN — RIVAROXABAN 20 MG: 20 TABLET, FILM COATED ORAL at 09:01

## 2024-09-16 RX ADMIN — LEVOTHYROXINE SODIUM 200 MCG: 100 TABLET ORAL at 07:31

## 2024-09-16 RX ADMIN — CALCIUM CARBONATE-VITAMIN D TAB 500 MG-200 UNIT 1 TABLET: 500-200 TAB at 09:02

## 2024-09-16 RX ADMIN — DILTIAZEM HYDROCHLORIDE 10 MG: 5 INJECTION, SOLUTION INTRAVENOUS at 22:02

## 2024-09-16 RX ADMIN — ALBUTEROL SULFATE 2.5 MG: 2.5 SOLUTION RESPIRATORY (INHALATION) at 11:43

## 2024-09-16 RX ADMIN — FAMOTIDINE 20 MG: 20 TABLET, FILM COATED ORAL at 20:35

## 2024-09-16 RX ADMIN — POTASSIUM CHLORIDE 20 MEQ: 1500 TABLET, EXTENDED RELEASE ORAL at 20:35

## 2024-09-16 RX ADMIN — SERTRALINE HYDROCHLORIDE 50 MG: 50 TABLET ORAL at 09:02

## 2024-09-16 RX ADMIN — CEFTRIAXONE SODIUM 1000 MG: 1 INJECTION, POWDER, FOR SOLUTION INTRAMUSCULAR; INTRAVENOUS at 09:03

## 2024-09-16 RX ADMIN — SODIUM CHLORIDE: 9 INJECTION, SOLUTION INTRAVENOUS at 03:41

## 2024-09-16 RX ADMIN — LEVALBUTEROL 1.25 MG: 1.25 SOLUTION, CONCENTRATE RESPIRATORY (INHALATION) at 20:06

## 2024-09-16 RX ADMIN — MAGNESIUM SULFATE HEPTAHYDRATE 2000 MG: 40 INJECTION, SOLUTION INTRAVENOUS at 15:43

## 2024-09-16 RX ADMIN — ALBUTEROL SULFATE 2.5 MG: 2.5 SOLUTION RESPIRATORY (INHALATION) at 00:36

## 2024-09-16 RX ADMIN — GUAIFENESIN 600 MG: 600 TABLET, EXTENDED RELEASE ORAL at 09:02

## 2024-09-16 RX ADMIN — POTASSIUM CHLORIDE 20 MEQ: 1500 TABLET, EXTENDED RELEASE ORAL at 09:02

## 2024-09-16 RX ADMIN — SODIUM CHLORIDE: 9 INJECTION, SOLUTION INTRAVENOUS at 19:43

## 2024-09-16 RX ADMIN — LEVALBUTEROL 1.25 MG: 1.25 SOLUTION, CONCENTRATE RESPIRATORY (INHALATION) at 15:34

## 2024-09-16 RX ADMIN — DULOXETINE HYDROCHLORIDE 60 MG: 60 CAPSULE, DELAYED RELEASE ORAL at 09:01

## 2024-09-16 RX ADMIN — ASPIRIN 81 MG: 81 TABLET, COATED ORAL at 09:02

## 2024-09-16 RX ADMIN — FAMOTIDINE 20 MG: 20 TABLET, FILM COATED ORAL at 09:02

## 2024-09-16 RX ADMIN — ALBUTEROL SULFATE 2.5 MG: 2.5 SOLUTION RESPIRATORY (INHALATION) at 07:55

## 2024-09-16 RX ADMIN — ALBUTEROL SULFATE 2.5 MG: 2.5 SOLUTION RESPIRATORY (INHALATION) at 04:00

## 2024-09-16 RX ADMIN — DOXYCYCLINE HYCLATE 100 MG: 100 CAPSULE ORAL at 09:02

## 2024-09-16 RX ADMIN — TIOTROPIUM BROMIDE AND OLODATEROL 2 PUFF: 3.124; 2.736 SPRAY, METERED RESPIRATORY (INHALATION) at 07:58

## 2024-09-16 RX ADMIN — DILTIAZEM HYDROCHLORIDE 10 MG: 5 INJECTION, SOLUTION INTRAVENOUS at 21:32

## 2024-09-16 RX ADMIN — GUAIFENESIN 600 MG: 600 TABLET, EXTENDED RELEASE ORAL at 20:35

## 2024-09-16 RX ADMIN — OXYCODONE HYDROCHLORIDE AND ACETAMINOPHEN 500 MG: 500 TABLET ORAL at 09:02

## 2024-09-16 RX ADMIN — DOXYCYCLINE HYCLATE 100 MG: 100 CAPSULE ORAL at 20:35

## 2024-09-16 RX ADMIN — CITALOPRAM 40 MG: 20 TABLET, FILM COATED ORAL at 09:01

## 2024-09-16 RX ADMIN — Medication 1 TABLET: at 09:02

## 2024-09-16 RX ADMIN — DILTIAZEM HYDROCHLORIDE 30 MG: 30 TABLET, FILM COATED ORAL at 23:19

## 2024-09-16 RX ADMIN — OXYCODONE HYDROCHLORIDE AND ACETAMINOPHEN 500 MG: 500 TABLET ORAL at 20:35

## 2024-09-16 RX ADMIN — PREGABALIN 300 MG: 75 CAPSULE ORAL at 20:35

## 2024-09-16 RX ADMIN — PREGABALIN 300 MG: 75 CAPSULE ORAL at 09:01

## 2024-09-16 ASSESSMENT — PAIN DESCRIPTION - DESCRIPTORS
DESCRIPTORS: SHOOTING
DESCRIPTORS: SHARP;SHOOTING
DESCRIPTORS: SHARP;SHOOTING

## 2024-09-16 ASSESSMENT — PAIN DESCRIPTION - PAIN TYPE
TYPE: CHRONIC PAIN

## 2024-09-16 ASSESSMENT — PAIN DESCRIPTION - ORIENTATION
ORIENTATION: LEFT
ORIENTATION: LEFT

## 2024-09-16 ASSESSMENT — PAIN DESCRIPTION - ONSET
ONSET: ON-GOING

## 2024-09-16 ASSESSMENT — PAIN SCALES - GENERAL
PAINLEVEL_OUTOF10: 7
PAINLEVEL_OUTOF10: 0

## 2024-09-16 ASSESSMENT — PAIN - FUNCTIONAL ASSESSMENT
PAIN_FUNCTIONAL_ASSESSMENT: ACTIVITIES ARE NOT PREVENTED

## 2024-09-16 ASSESSMENT — PAIN DESCRIPTION - FREQUENCY
FREQUENCY: INTERMITTENT

## 2024-09-16 ASSESSMENT — PAIN DESCRIPTION - LOCATION
LOCATION: BACK;LEG

## 2024-09-17 LAB
ALBUMIN SERPL-MCNC: 3.4 G/DL (ref 3.5–5.2)
ALBUMIN/GLOB SERPL: 1.2 {RATIO} (ref 1–2.5)
ALP SERPL-CCNC: 67 U/L (ref 35–104)
ALT SERPL-CCNC: 13 U/L (ref 10–35)
ANION GAP SERPL CALCULATED.3IONS-SCNC: 8 MMOL/L (ref 9–16)
ARTERIAL PATENCY WRIST A: YES
AST SERPL-CCNC: 25 U/L (ref 10–35)
BASOPHILS # BLD: 0 K/UL (ref 0–0.2)
BASOPHILS NFR BLD: 0 % (ref 0–2)
BDY SITE: ABNORMAL
BILIRUB SERPL-MCNC: 0.5 MG/DL (ref 0–1.2)
BODY TEMPERATURE: 37
BUN SERPL-MCNC: 9 MG/DL (ref 8–23)
BUN/CREAT SERPL: 18 (ref 9–20)
CALCIUM SERPL-MCNC: 9.5 MG/DL (ref 8.6–10.4)
CHLORIDE SERPL-SCNC: 102 MMOL/L (ref 98–107)
CO2 SERPL-SCNC: 27 MMOL/L (ref 20–31)
CREAT SERPL-MCNC: 0.5 MG/DL (ref 0.5–0.9)
CRP SERPL HS-MCNC: 293 MG/L (ref 0–5)
EKG Q-T INTERVAL: 318 MS
EKG QRS DURATION: 80 MS
EKG QTC CALCULATION (BAZETT): 477 MS
EKG R AXIS: -23 DEGREES
EKG T AXIS: 60 DEGREES
EKG VENTRICULAR RATE: 135 BPM
EOSINOPHIL # BLD: 0 K/UL (ref 0–0.44)
EOSINOPHILS RELATIVE PERCENT: 0 % (ref 1–4)
ERYTHROCYTE [DISTWIDTH] IN BLOOD BY AUTOMATED COUNT: 14.5 % (ref 11.8–14.4)
FIO2 ON VENT: 41 %
GAS FLOW.O2 O2 DELIVERY SYS: ABNORMAL L/MIN
GFR, ESTIMATED: >90 ML/MIN/1.73M2
GLUCOSE SERPL-MCNC: 106 MG/DL (ref 74–99)
HCO3 ARTERIAL: 22.3 MMOL/L (ref 22–26)
HCT VFR BLD AUTO: 39.6 % (ref 36.3–47.1)
HGB BLD-MCNC: 13.4 G/DL (ref 11.9–15.1)
IMM GRANULOCYTES # BLD AUTO: 0.21 K/UL (ref 0–0.3)
IMM GRANULOCYTES NFR BLD: 2 %
LYMPHOCYTES NFR BLD: 0.75 K/UL (ref 1.1–3.7)
LYMPHOCYTES RELATIVE PERCENT: 7 % (ref 24–43)
MCH RBC QN AUTO: 32.1 PG (ref 25.2–33.5)
MCHC RBC AUTO-ENTMCNC: 33.8 G/DL (ref 28.4–34.8)
MCV RBC AUTO: 95 FL (ref 82.6–102.9)
MICROORGANISM SPEC CULT: ABNORMAL
MICROORGANISM SPEC CULT: NO GROWTH
MICROORGANISM/AGENT SPEC: ABNORMAL
MONOCYTES NFR BLD: 0.43 K/UL (ref 0.1–1.2)
MONOCYTES NFR BLD: 4 % (ref 3–12)
MORPHOLOGY: ABNORMAL
NEGATIVE BASE EXCESS, ART: 1.2 MMOL/L (ref 0–2)
NEUTROPHILS NFR BLD: 87 % (ref 36–65)
NEUTS SEG NFR BLD: 9.31 K/UL (ref 1.5–8.1)
NRBC BLD-RTO: 0 PER 100 WBC
O2 SAT, ARTERIAL: 96.2 % (ref 95–98)
PCO2 ARTERIAL: 34.1 MMHG (ref 35–45)
PCO2, ART, TEMP ADJ: 34.1 (ref 35–45)
PH ARTERIAL: 7.43 (ref 7.35–7.45)
PH, ART, TEMP ADJ: 7.43 (ref 7.35–7.45)
PLATELET # BLD AUTO: 198 K/UL (ref 138–453)
PMV BLD AUTO: 9.6 FL (ref 8.1–13.5)
PO2 ARTERIAL: 79.9 MMHG (ref 80–100)
PO2, ART, TEMP ADJ: 79.9 MMHG (ref 80–100)
POTASSIUM SERPL-SCNC: 3.9 MMOL/L (ref 3.7–5.3)
PROT SERPL-MCNC: 6.3 G/DL (ref 6.6–8.7)
RBC # BLD AUTO: 4.17 M/UL (ref 3.95–5.11)
SERVICE CMNT-IMP: ABNORMAL
SERVICE CMNT-IMP: ABNORMAL
SERVICE CMNT-IMP: NORMAL
SODIUM SERPL-SCNC: 137 MMOL/L (ref 136–145)
SPECIMEN DESCRIPTION: ABNORMAL
SPECIMEN DESCRIPTION: ABNORMAL
SPECIMEN DESCRIPTION: NORMAL
WBC OTHER # BLD: 10.7 K/UL (ref 3.5–11.3)

## 2024-09-17 PROCEDURE — 94660 CPAP INITIATION&MGMT: CPT

## 2024-09-17 PROCEDURE — 97110 THERAPEUTIC EXERCISES: CPT

## 2024-09-17 PROCEDURE — 6370000000 HC RX 637 (ALT 250 FOR IP): Performed by: STUDENT IN AN ORGANIZED HEALTH CARE EDUCATION/TRAINING PROGRAM

## 2024-09-17 PROCEDURE — 36415 COLL VENOUS BLD VENIPUNCTURE: CPT

## 2024-09-17 PROCEDURE — 6370000000 HC RX 637 (ALT 250 FOR IP)

## 2024-09-17 PROCEDURE — 2580000003 HC RX 258: Performed by: STUDENT IN AN ORGANIZED HEALTH CARE EDUCATION/TRAINING PROGRAM

## 2024-09-17 PROCEDURE — 6360000002 HC RX W HCPCS: Performed by: STUDENT IN AN ORGANIZED HEALTH CARE EDUCATION/TRAINING PROGRAM

## 2024-09-17 PROCEDURE — 92526 ORAL FUNCTION THERAPY: CPT

## 2024-09-17 PROCEDURE — 2700000000 HC OXYGEN THERAPY PER DAY

## 2024-09-17 PROCEDURE — 94640 AIRWAY INHALATION TREATMENT: CPT

## 2024-09-17 PROCEDURE — 36600 WITHDRAWAL OF ARTERIAL BLOOD: CPT

## 2024-09-17 PROCEDURE — 94669 MECHANICAL CHEST WALL OSCILL: CPT

## 2024-09-17 PROCEDURE — 85025 COMPLETE CBC W/AUTO DIFF WBC: CPT

## 2024-09-17 PROCEDURE — 80053 COMPREHEN METABOLIC PANEL: CPT

## 2024-09-17 PROCEDURE — 82805 BLOOD GASES W/O2 SATURATION: CPT

## 2024-09-17 PROCEDURE — 2000000000 HC ICU R&B

## 2024-09-17 PROCEDURE — 2580000003 HC RX 258

## 2024-09-17 PROCEDURE — 94664 DEMO&/EVAL PT USE INHALER: CPT

## 2024-09-17 PROCEDURE — 87040 BLOOD CULTURE FOR BACTERIA: CPT

## 2024-09-17 PROCEDURE — 94761 N-INVAS EAR/PLS OXIMETRY MLT: CPT

## 2024-09-17 PROCEDURE — 86140 C-REACTIVE PROTEIN: CPT

## 2024-09-17 PROCEDURE — 6360000002 HC RX W HCPCS: Performed by: INTERNAL MEDICINE

## 2024-09-17 PROCEDURE — 99223 1ST HOSP IP/OBS HIGH 75: CPT | Performed by: INTERNAL MEDICINE

## 2024-09-17 PROCEDURE — 6370000000 HC RX 637 (ALT 250 FOR IP): Performed by: NURSE PRACTITIONER

## 2024-09-17 PROCEDURE — 97116 GAIT TRAINING THERAPY: CPT

## 2024-09-17 PROCEDURE — 93010 ELECTROCARDIOGRAM REPORT: CPT | Performed by: INTERNAL MEDICINE

## 2024-09-17 RX ORDER — LEVALBUTEROL 1.25 MG/.5ML
1.25 SOLUTION, CONCENTRATE RESPIRATORY (INHALATION) EVERY 4 HOURS
Status: DISCONTINUED | OUTPATIENT
Start: 2024-09-17 | End: 2024-09-17

## 2024-09-17 RX ORDER — SODIUM CHLORIDE FOR INHALATION 0.9 %
VIAL, NEBULIZER (ML) INHALATION
Status: DISPENSED
Start: 2024-09-17 | End: 2024-09-18

## 2024-09-17 RX ORDER — DILTIAZEM HYDROCHLORIDE 120 MG/1
120 CAPSULE, COATED, EXTENDED RELEASE ORAL DAILY
Status: DISCONTINUED | OUTPATIENT
Start: 2024-09-17 | End: 2024-09-19 | Stop reason: HOSPADM

## 2024-09-17 RX ORDER — DOXYCYCLINE 100 MG/1
100 CAPSULE ORAL EVERY 12 HOURS SCHEDULED
Status: DISCONTINUED | OUTPATIENT
Start: 2024-09-18 | End: 2024-09-19 | Stop reason: HOSPADM

## 2024-09-17 RX ORDER — LEVALBUTEROL 1.25 MG/.5ML
1.25 SOLUTION, CONCENTRATE RESPIRATORY (INHALATION) EVERY 8 HOURS
Status: DISCONTINUED | OUTPATIENT
Start: 2024-09-18 | End: 2024-09-19 | Stop reason: HOSPADM

## 2024-09-17 RX ADMIN — Medication 1 TABLET: at 08:04

## 2024-09-17 RX ADMIN — TIOTROPIUM BROMIDE AND OLODATEROL 2 PUFF: 3.124; 2.736 SPRAY, METERED RESPIRATORY (INHALATION) at 09:53

## 2024-09-17 RX ADMIN — PREGABALIN 300 MG: 75 CAPSULE ORAL at 08:03

## 2024-09-17 RX ADMIN — DILTIAZEM HYDROCHLORIDE 30 MG: 30 TABLET, FILM COATED ORAL at 11:36

## 2024-09-17 RX ADMIN — SODIUM CHLORIDE, PRESERVATIVE FREE 10 ML: 5 INJECTION INTRAVENOUS at 20:37

## 2024-09-17 RX ADMIN — LEVOTHYROXINE SODIUM 200 MCG: 100 TABLET ORAL at 07:06

## 2024-09-17 RX ADMIN — LEVALBUTEROL 1.25 MG: 1.25 SOLUTION, CONCENTRATE RESPIRATORY (INHALATION) at 09:51

## 2024-09-17 RX ADMIN — LEVALBUTEROL 1.25 MG: 1.25 SOLUTION, CONCENTRATE RESPIRATORY (INHALATION) at 04:34

## 2024-09-17 RX ADMIN — TRAZODONE HYDROCHLORIDE 100 MG: 50 TABLET ORAL at 22:04

## 2024-09-17 RX ADMIN — LEVALBUTEROL 1.25 MG: 1.25 SOLUTION, CONCENTRATE RESPIRATORY (INHALATION) at 16:02

## 2024-09-17 RX ADMIN — GUAIFENESIN 600 MG: 600 TABLET, EXTENDED RELEASE ORAL at 20:38

## 2024-09-17 RX ADMIN — DILTIAZEM HYDROCHLORIDE 120 MG: 120 CAPSULE, COATED, EXTENDED RELEASE ORAL at 15:06

## 2024-09-17 RX ADMIN — SERTRALINE HYDROCHLORIDE 50 MG: 50 TABLET ORAL at 08:04

## 2024-09-17 RX ADMIN — SODIUM CHLORIDE: 9 INJECTION, SOLUTION INTRAVENOUS at 05:19

## 2024-09-17 RX ADMIN — RIVAROXABAN 20 MG: 20 TABLET, FILM COATED ORAL at 08:03

## 2024-09-17 RX ADMIN — OXYCODONE HYDROCHLORIDE AND ACETAMINOPHEN 500 MG: 500 TABLET ORAL at 08:03

## 2024-09-17 RX ADMIN — DOXYCYCLINE HYCLATE 100 MG: 100 CAPSULE ORAL at 20:38

## 2024-09-17 RX ADMIN — CEFTRIAXONE SODIUM 1000 MG: 1 INJECTION, POWDER, FOR SOLUTION INTRAMUSCULAR; INTRAVENOUS at 09:09

## 2024-09-17 RX ADMIN — FAMOTIDINE 20 MG: 20 TABLET, FILM COATED ORAL at 08:03

## 2024-09-17 RX ADMIN — DILTIAZEM HYDROCHLORIDE 30 MG: 30 TABLET, FILM COATED ORAL at 05:18

## 2024-09-17 RX ADMIN — CALCIUM CARBONATE-VITAMIN D TAB 500 MG-200 UNIT 1 TABLET: 500-200 TAB at 08:03

## 2024-09-17 RX ADMIN — SODIUM CHLORIDE, PRESERVATIVE FREE 10 ML: 5 INJECTION INTRAVENOUS at 09:05

## 2024-09-17 RX ADMIN — DOXYCYCLINE HYCLATE 100 MG: 100 CAPSULE ORAL at 08:03

## 2024-09-17 RX ADMIN — GUAIFENESIN 600 MG: 600 TABLET, EXTENDED RELEASE ORAL at 08:03

## 2024-09-17 RX ADMIN — CITALOPRAM 40 MG: 20 TABLET, FILM COATED ORAL at 08:03

## 2024-09-17 RX ADMIN — DULOXETINE HYDROCHLORIDE 60 MG: 60 CAPSULE, DELAYED RELEASE ORAL at 08:04

## 2024-09-17 RX ADMIN — ASPIRIN 81 MG: 81 TABLET, COATED ORAL at 08:03

## 2024-09-17 RX ADMIN — POTASSIUM CHLORIDE 20 MEQ: 1500 TABLET, EXTENDED RELEASE ORAL at 08:03

## 2024-09-17 RX ADMIN — FAMOTIDINE 20 MG: 20 TABLET, FILM COATED ORAL at 20:38

## 2024-09-17 RX ADMIN — POTASSIUM CHLORIDE 20 MEQ: 1500 TABLET, EXTENDED RELEASE ORAL at 20:38

## 2024-09-17 RX ADMIN — OXYCODONE HYDROCHLORIDE AND ACETAMINOPHEN 500 MG: 500 TABLET ORAL at 20:38

## 2024-09-17 RX ADMIN — IPRATROPIUM BROMIDE 0.5 MG: 0.5 SOLUTION RESPIRATORY (INHALATION) at 20:05

## 2024-09-17 RX ADMIN — PREGABALIN 300 MG: 75 CAPSULE ORAL at 20:38

## 2024-09-17 ASSESSMENT — PAIN SCALES - GENERAL
PAINLEVEL_OUTOF10: 0
PAINLEVEL_OUTOF10: 0

## 2024-09-17 ASSESSMENT — ENCOUNTER SYMPTOMS
ALLERGIC/IMMUNOLOGIC NEGATIVE: 1
SHORTNESS OF BREATH: 1
COUGH: 1
EYES NEGATIVE: 1
GASTROINTESTINAL NEGATIVE: 1

## 2024-09-18 LAB
ALBUMIN SERPL-MCNC: 3 G/DL (ref 3.5–5.2)
ALBUMIN/GLOB SERPL: 1 {RATIO} (ref 1–2.5)
ALP SERPL-CCNC: 62 U/L (ref 35–104)
ALT SERPL-CCNC: 9 U/L (ref 10–35)
ANION GAP SERPL CALCULATED.3IONS-SCNC: 6 MMOL/L (ref 9–16)
AST SERPL-CCNC: 10 U/L (ref 10–35)
BASOPHILS # BLD: <0.03 K/UL (ref 0–0.2)
BASOPHILS NFR BLD: 0 % (ref 0–2)
BILIRUB SERPL-MCNC: 0.5 MG/DL (ref 0–1.2)
BUN SERPL-MCNC: 12 MG/DL (ref 8–23)
BUN/CREAT SERPL: 30 (ref 9–20)
CALCIUM SERPL-MCNC: 9.2 MG/DL (ref 8.6–10.4)
CHLORIDE SERPL-SCNC: 105 MMOL/L (ref 98–107)
CO2 SERPL-SCNC: 28 MMOL/L (ref 20–31)
CREAT SERPL-MCNC: 0.4 MG/DL (ref 0.5–0.9)
CRP SERPL HS-MCNC: 192 MG/L (ref 0–5)
EOSINOPHIL # BLD: 0.08 K/UL (ref 0–0.44)
EOSINOPHILS RELATIVE PERCENT: 1 % (ref 1–4)
ERYTHROCYTE [DISTWIDTH] IN BLOOD BY AUTOMATED COUNT: 14.5 % (ref 11.8–14.4)
GFR, ESTIMATED: >90 ML/MIN/1.73M2
GLUCOSE SERPL-MCNC: 104 MG/DL (ref 74–99)
HCT VFR BLD AUTO: 33.4 % (ref 36.3–47.1)
HGB BLD-MCNC: 11 G/DL (ref 11.9–15.1)
IMM GRANULOCYTES # BLD AUTO: <0.03 K/UL (ref 0–0.3)
IMM GRANULOCYTES NFR BLD: 0 %
LYMPHOCYTES NFR BLD: 0.65 K/UL (ref 1.1–3.7)
LYMPHOCYTES RELATIVE PERCENT: 8 % (ref 24–43)
MAGNESIUM SERPL-MCNC: 1.9 MG/DL (ref 1.6–2.4)
MCH RBC QN AUTO: 31.6 PG (ref 25.2–33.5)
MCHC RBC AUTO-ENTMCNC: 32.9 G/DL (ref 28.4–34.8)
MCV RBC AUTO: 96 FL (ref 82.6–102.9)
MONOCYTES NFR BLD: 0.43 K/UL (ref 0.1–1.2)
MONOCYTES NFR BLD: 5 % (ref 3–12)
NEUTROPHILS NFR BLD: 85 % (ref 36–65)
NEUTS SEG NFR BLD: 6.88 K/UL (ref 1.5–8.1)
NRBC BLD-RTO: 0 PER 100 WBC
PLATELET # BLD AUTO: 172 K/UL (ref 138–453)
PMV BLD AUTO: 9.2 FL (ref 8.1–13.5)
POTASSIUM SERPL-SCNC: 4 MMOL/L (ref 3.7–5.3)
PROT SERPL-MCNC: 5.8 G/DL (ref 6.6–8.7)
RBC # BLD AUTO: 3.48 M/UL (ref 3.95–5.11)
SODIUM SERPL-SCNC: 139 MMOL/L (ref 136–145)
WBC OTHER # BLD: 8.1 K/UL (ref 3.5–11.3)

## 2024-09-18 PROCEDURE — 6370000000 HC RX 637 (ALT 250 FOR IP): Performed by: NURSE PRACTITIONER

## 2024-09-18 PROCEDURE — 85025 COMPLETE CBC W/AUTO DIFF WBC: CPT

## 2024-09-18 PROCEDURE — 94640 AIRWAY INHALATION TREATMENT: CPT

## 2024-09-18 PROCEDURE — 94669 MECHANICAL CHEST WALL OSCILL: CPT

## 2024-09-18 PROCEDURE — 83735 ASSAY OF MAGNESIUM: CPT

## 2024-09-18 PROCEDURE — 97110 THERAPEUTIC EXERCISES: CPT

## 2024-09-18 PROCEDURE — 86140 C-REACTIVE PROTEIN: CPT

## 2024-09-18 PROCEDURE — 6360000002 HC RX W HCPCS: Performed by: NURSE PRACTITIONER

## 2024-09-18 PROCEDURE — 1200000000 HC SEMI PRIVATE

## 2024-09-18 PROCEDURE — 36415 COLL VENOUS BLD VENIPUNCTURE: CPT

## 2024-09-18 PROCEDURE — 6370000000 HC RX 637 (ALT 250 FOR IP)

## 2024-09-18 PROCEDURE — 2700000000 HC OXYGEN THERAPY PER DAY

## 2024-09-18 PROCEDURE — 97116 GAIT TRAINING THERAPY: CPT

## 2024-09-18 PROCEDURE — 94660 CPAP INITIATION&MGMT: CPT

## 2024-09-18 PROCEDURE — 80053 COMPREHEN METABOLIC PANEL: CPT

## 2024-09-18 PROCEDURE — 2580000003 HC RX 258: Performed by: NURSE PRACTITIONER

## 2024-09-18 PROCEDURE — 2580000003 HC RX 258

## 2024-09-18 PROCEDURE — 6370000000 HC RX 637 (ALT 250 FOR IP): Performed by: STUDENT IN AN ORGANIZED HEALTH CARE EDUCATION/TRAINING PROGRAM

## 2024-09-18 PROCEDURE — 2580000003 HC RX 258: Performed by: STUDENT IN AN ORGANIZED HEALTH CARE EDUCATION/TRAINING PROGRAM

## 2024-09-18 PROCEDURE — 6360000002 HC RX W HCPCS: Performed by: INTERNAL MEDICINE

## 2024-09-18 PROCEDURE — 94761 N-INVAS EAR/PLS OXIMETRY MLT: CPT

## 2024-09-18 PROCEDURE — 92526 ORAL FUNCTION THERAPY: CPT

## 2024-09-18 PROCEDURE — 6360000002 HC RX W HCPCS

## 2024-09-18 PROCEDURE — 94664 DEMO&/EVAL PT USE INHALER: CPT

## 2024-09-18 RX ORDER — DIGOXIN 0.25 MG/ML
250 INJECTION INTRAMUSCULAR; INTRAVENOUS ONCE
Status: COMPLETED | OUTPATIENT
Start: 2024-09-18 | End: 2024-09-18

## 2024-09-18 RX ORDER — DIGOXIN 125 MCG
125 TABLET ORAL DAILY
Status: DISCONTINUED | OUTPATIENT
Start: 2024-09-19 | End: 2024-09-19 | Stop reason: HOSPADM

## 2024-09-18 RX ADMIN — CEFTRIAXONE SODIUM 2000 MG: 2 INJECTION, POWDER, FOR SOLUTION INTRAMUSCULAR; INTRAVENOUS at 09:35

## 2024-09-18 RX ADMIN — OXYCODONE HYDROCHLORIDE AND ACETAMINOPHEN 500 MG: 500 TABLET ORAL at 21:11

## 2024-09-18 RX ADMIN — CALCIUM CARBONATE-VITAMIN D TAB 500 MG-200 UNIT 1 TABLET: 500-200 TAB at 09:18

## 2024-09-18 RX ADMIN — GUAIFENESIN 600 MG: 600 TABLET, EXTENDED RELEASE ORAL at 09:19

## 2024-09-18 RX ADMIN — DIGOXIN 250 MCG: 0.25 INJECTION INTRAMUSCULAR; INTRAVENOUS at 09:20

## 2024-09-18 RX ADMIN — RIVAROXABAN 20 MG: 20 TABLET, FILM COATED ORAL at 09:19

## 2024-09-18 RX ADMIN — DILTIAZEM HYDROCHLORIDE 120 MG: 120 CAPSULE, COATED, EXTENDED RELEASE ORAL at 09:19

## 2024-09-18 RX ADMIN — IPRATROPIUM BROMIDE 0.5 MG: 0.5 SOLUTION RESPIRATORY (INHALATION) at 20:20

## 2024-09-18 RX ADMIN — CITALOPRAM 40 MG: 20 TABLET, FILM COATED ORAL at 09:19

## 2024-09-18 RX ADMIN — SODIUM CHLORIDE, PRESERVATIVE FREE 10 ML: 5 INJECTION INTRAVENOUS at 21:13

## 2024-09-18 RX ADMIN — TRAZODONE HYDROCHLORIDE 100 MG: 50 TABLET ORAL at 22:04

## 2024-09-18 RX ADMIN — POTASSIUM CHLORIDE 20 MEQ: 1500 TABLET, EXTENDED RELEASE ORAL at 21:11

## 2024-09-18 RX ADMIN — PREGABALIN 300 MG: 75 CAPSULE ORAL at 09:19

## 2024-09-18 RX ADMIN — LEVALBUTEROL 1.25 MG: 1.25 SOLUTION, CONCENTRATE RESPIRATORY (INHALATION) at 20:20

## 2024-09-18 RX ADMIN — FAMOTIDINE 20 MG: 20 TABLET, FILM COATED ORAL at 21:12

## 2024-09-18 RX ADMIN — DULOXETINE HYDROCHLORIDE 60 MG: 60 CAPSULE, DELAYED RELEASE ORAL at 09:19

## 2024-09-18 RX ADMIN — IPRATROPIUM BROMIDE 0.5 MG: 0.5 SOLUTION RESPIRATORY (INHALATION) at 15:42

## 2024-09-18 RX ADMIN — ASPIRIN 81 MG: 81 TABLET, COATED ORAL at 09:19

## 2024-09-18 RX ADMIN — SERTRALINE HYDROCHLORIDE 50 MG: 50 TABLET ORAL at 09:18

## 2024-09-18 RX ADMIN — LEVALBUTEROL 1.25 MG: 1.25 SOLUTION, CONCENTRATE RESPIRATORY (INHALATION) at 00:01

## 2024-09-18 RX ADMIN — DOXYCYCLINE HYCLATE 100 MG: 100 CAPSULE ORAL at 21:11

## 2024-09-18 RX ADMIN — OXYCODONE HYDROCHLORIDE AND ACETAMINOPHEN 500 MG: 500 TABLET ORAL at 09:18

## 2024-09-18 RX ADMIN — DOXYCYCLINE HYCLATE 100 MG: 100 CAPSULE ORAL at 09:19

## 2024-09-18 RX ADMIN — SODIUM CHLORIDE, PRESERVATIVE FREE 10 ML: 5 INJECTION INTRAVENOUS at 09:30

## 2024-09-18 RX ADMIN — IPRATROPIUM BROMIDE 0.5 MG: 0.5 SOLUTION RESPIRATORY (INHALATION) at 09:15

## 2024-09-18 RX ADMIN — IPRATROPIUM BROMIDE 0.5 MG: 0.5 SOLUTION RESPIRATORY (INHALATION) at 04:52

## 2024-09-18 RX ADMIN — FAMOTIDINE 20 MG: 20 TABLET, FILM COATED ORAL at 09:18

## 2024-09-18 RX ADMIN — POTASSIUM CHLORIDE 20 MEQ: 1500 TABLET, EXTENDED RELEASE ORAL at 09:19

## 2024-09-18 RX ADMIN — LEVOTHYROXINE SODIUM 200 MCG: 100 TABLET ORAL at 07:21

## 2024-09-18 RX ADMIN — PREGABALIN 300 MG: 75 CAPSULE ORAL at 21:12

## 2024-09-18 RX ADMIN — GUAIFENESIN 600 MG: 600 TABLET, EXTENDED RELEASE ORAL at 21:12

## 2024-09-18 RX ADMIN — Medication 1 TABLET: at 09:18

## 2024-09-18 RX ADMIN — LEVALBUTEROL 1.25 MG: 1.25 SOLUTION, CONCENTRATE RESPIRATORY (INHALATION) at 09:14

## 2024-09-18 ASSESSMENT — PAIN SCALES - GENERAL
PAINLEVEL_OUTOF10: 7
PAINLEVEL_OUTOF10: 0

## 2024-09-18 ASSESSMENT — PAIN DESCRIPTION - FREQUENCY: FREQUENCY: INTERMITTENT

## 2024-09-18 ASSESSMENT — PAIN DESCRIPTION - LOCATION: LOCATION: BACK;LEG

## 2024-09-18 ASSESSMENT — PAIN - FUNCTIONAL ASSESSMENT: PAIN_FUNCTIONAL_ASSESSMENT: ACTIVITIES ARE NOT PREVENTED

## 2024-09-18 ASSESSMENT — PAIN DESCRIPTION - DESCRIPTORS: DESCRIPTORS: SHOOTING

## 2024-09-18 ASSESSMENT — PAIN DESCRIPTION - PAIN TYPE: TYPE: CHRONIC PAIN

## 2024-09-18 ASSESSMENT — PAIN DESCRIPTION - ORIENTATION: ORIENTATION: LEFT

## 2024-09-18 ASSESSMENT — PAIN DESCRIPTION - ONSET: ONSET: ON-GOING

## 2024-09-19 VITALS
RESPIRATION RATE: 16 BRPM | BODY MASS INDEX: 24.01 KG/M2 | TEMPERATURE: 96.8 F | HEIGHT: 65 IN | WEIGHT: 144.1 LBS | OXYGEN SATURATION: 95 % | HEART RATE: 104 BPM | DIASTOLIC BLOOD PRESSURE: 61 MMHG | SYSTOLIC BLOOD PRESSURE: 110 MMHG

## 2024-09-19 LAB
ALBUMIN SERPL-MCNC: 3.5 G/DL (ref 3.5–5.2)
ALBUMIN/GLOB SERPL: 1.1 {RATIO} (ref 1–2.5)
ALP SERPL-CCNC: 78 U/L (ref 35–104)
ALT SERPL-CCNC: 13 U/L (ref 10–35)
ANION GAP SERPL CALCULATED.3IONS-SCNC: 9 MMOL/L (ref 9–16)
AST SERPL-CCNC: 12 U/L (ref 10–35)
BASOPHILS # BLD: <0.03 K/UL (ref 0–0.2)
BASOPHILS NFR BLD: 0 % (ref 0–2)
BILIRUB SERPL-MCNC: 0.4 MG/DL (ref 0–1.2)
BUN SERPL-MCNC: 11 MG/DL (ref 8–23)
BUN/CREAT SERPL: 22 (ref 9–20)
CALCIUM SERPL-MCNC: 10.1 MG/DL (ref 8.6–10.4)
CHLORIDE SERPL-SCNC: 100 MMOL/L (ref 98–107)
CO2 SERPL-SCNC: 30 MMOL/L (ref 20–31)
CREAT SERPL-MCNC: 0.5 MG/DL (ref 0.5–0.9)
CRP SERPL HS-MCNC: 109 MG/L (ref 0–5)
EOSINOPHIL # BLD: 0.08 K/UL (ref 0–0.44)
EOSINOPHILS RELATIVE PERCENT: 1 % (ref 1–4)
ERYTHROCYTE [DISTWIDTH] IN BLOOD BY AUTOMATED COUNT: 14.6 % (ref 11.8–14.4)
GFR, ESTIMATED: >90 ML/MIN/1.73M2
GLUCOSE SERPL-MCNC: 91 MG/DL (ref 74–99)
HCT VFR BLD AUTO: 39.3 % (ref 36.3–47.1)
HGB BLD-MCNC: 13 G/DL (ref 11.9–15.1)
IMM GRANULOCYTES # BLD AUTO: <0.03 K/UL (ref 0–0.3)
IMM GRANULOCYTES NFR BLD: 0 %
LYMPHOCYTES NFR BLD: 0.99 K/UL (ref 1.1–3.7)
LYMPHOCYTES RELATIVE PERCENT: 17 % (ref 24–43)
MCH RBC QN AUTO: 31.7 PG (ref 25.2–33.5)
MCHC RBC AUTO-ENTMCNC: 33.1 G/DL (ref 28.4–34.8)
MCV RBC AUTO: 95.9 FL (ref 82.6–102.9)
MONOCYTES NFR BLD: 0.49 K/UL (ref 0.1–1.2)
MONOCYTES NFR BLD: 8 % (ref 3–12)
NEUTROPHILS NFR BLD: 74 % (ref 36–65)
NEUTS SEG NFR BLD: 4.21 K/UL (ref 1.5–8.1)
NRBC BLD-RTO: 0 PER 100 WBC
PLATELET # BLD AUTO: 232 K/UL (ref 138–453)
PMV BLD AUTO: 9.6 FL (ref 8.1–13.5)
POTASSIUM SERPL-SCNC: 4.2 MMOL/L (ref 3.7–5.3)
PROT SERPL-MCNC: 6.8 G/DL (ref 6.6–8.7)
RBC # BLD AUTO: 4.1 M/UL (ref 3.95–5.11)
SODIUM SERPL-SCNC: 139 MMOL/L (ref 136–145)
WBC OTHER # BLD: 5.8 K/UL (ref 3.5–11.3)

## 2024-09-19 PROCEDURE — 6370000000 HC RX 637 (ALT 250 FOR IP): Performed by: NURSE PRACTITIONER

## 2024-09-19 PROCEDURE — 94640 AIRWAY INHALATION TREATMENT: CPT

## 2024-09-19 PROCEDURE — 97116 GAIT TRAINING THERAPY: CPT

## 2024-09-19 PROCEDURE — 2580000003 HC RX 258: Performed by: NURSE PRACTITIONER

## 2024-09-19 PROCEDURE — 94669 MECHANICAL CHEST WALL OSCILL: CPT

## 2024-09-19 PROCEDURE — 85025 COMPLETE CBC W/AUTO DIFF WBC: CPT

## 2024-09-19 PROCEDURE — 6360000002 HC RX W HCPCS: Performed by: NURSE PRACTITIONER

## 2024-09-19 PROCEDURE — 2700000000 HC OXYGEN THERAPY PER DAY

## 2024-09-19 PROCEDURE — 97535 SELF CARE MNGMENT TRAINING: CPT

## 2024-09-19 PROCEDURE — 80053 COMPREHEN METABOLIC PANEL: CPT

## 2024-09-19 PROCEDURE — 86140 C-REACTIVE PROTEIN: CPT

## 2024-09-19 PROCEDURE — 94761 N-INVAS EAR/PLS OXIMETRY MLT: CPT

## 2024-09-19 PROCEDURE — 94660 CPAP INITIATION&MGMT: CPT

## 2024-09-19 PROCEDURE — 36415 COLL VENOUS BLD VENIPUNCTURE: CPT

## 2024-09-19 PROCEDURE — 97110 THERAPEUTIC EXERCISES: CPT

## 2024-09-19 RX ORDER — DOXYCYCLINE 100 MG/1
100 CAPSULE ORAL EVERY 12 HOURS SCHEDULED
Qty: 12 CAPSULE | Refills: 0 | Status: SHIPPED | OUTPATIENT
Start: 2024-09-19 | End: 2024-09-25

## 2024-09-19 RX ORDER — DILTIAZEM HYDROCHLORIDE 120 MG/1
120 CAPSULE, COATED, EXTENDED RELEASE ORAL DAILY
Qty: 30 CAPSULE | Refills: 3 | Status: ON HOLD | OUTPATIENT
Start: 2024-09-20

## 2024-09-19 RX ORDER — DIGOXIN 125 MCG
125 TABLET ORAL DAILY
Qty: 30 TABLET | Refills: 3 | Status: ON HOLD | OUTPATIENT
Start: 2024-09-20

## 2024-09-19 RX ADMIN — LEVOTHYROXINE SODIUM 200 MCG: 100 TABLET ORAL at 08:36

## 2024-09-19 RX ADMIN — CEFTRIAXONE SODIUM 2000 MG: 2 INJECTION, POWDER, FOR SOLUTION INTRAMUSCULAR; INTRAVENOUS at 08:46

## 2024-09-19 RX ADMIN — SERTRALINE HYDROCHLORIDE 50 MG: 50 TABLET ORAL at 08:36

## 2024-09-19 RX ADMIN — PREGABALIN 300 MG: 75 CAPSULE ORAL at 08:37

## 2024-09-19 RX ADMIN — DULOXETINE HYDROCHLORIDE 60 MG: 60 CAPSULE, DELAYED RELEASE ORAL at 08:36

## 2024-09-19 RX ADMIN — CITALOPRAM 40 MG: 20 TABLET, FILM COATED ORAL at 09:44

## 2024-09-19 RX ADMIN — ASPIRIN 81 MG: 81 TABLET, COATED ORAL at 08:37

## 2024-09-19 RX ADMIN — IPRATROPIUM BROMIDE 0.5 MG: 0.5 SOLUTION RESPIRATORY (INHALATION) at 10:14

## 2024-09-19 RX ADMIN — LEVALBUTEROL 1.25 MG: 1.25 SOLUTION, CONCENTRATE RESPIRATORY (INHALATION) at 10:14

## 2024-09-19 RX ADMIN — OXYCODONE HYDROCHLORIDE AND ACETAMINOPHEN 500 MG: 500 TABLET ORAL at 08:37

## 2024-09-19 RX ADMIN — POTASSIUM CHLORIDE 20 MEQ: 1500 TABLET, EXTENDED RELEASE ORAL at 08:37

## 2024-09-19 RX ADMIN — DILTIAZEM HYDROCHLORIDE 120 MG: 120 CAPSULE, COATED, EXTENDED RELEASE ORAL at 08:37

## 2024-09-19 RX ADMIN — SODIUM CHLORIDE, PRESERVATIVE FREE 10 ML: 5 INJECTION INTRAVENOUS at 08:49

## 2024-09-19 RX ADMIN — Medication 1 TABLET: at 08:36

## 2024-09-19 RX ADMIN — DIGOXIN 125 MCG: 125 TABLET ORAL at 08:37

## 2024-09-19 RX ADMIN — LEVALBUTEROL 1.25 MG: 1.25 SOLUTION, CONCENTRATE RESPIRATORY (INHALATION) at 05:35

## 2024-09-19 RX ADMIN — RIVAROXABAN 20 MG: 20 TABLET, FILM COATED ORAL at 08:37

## 2024-09-19 RX ADMIN — GUAIFENESIN 600 MG: 600 TABLET, EXTENDED RELEASE ORAL at 08:37

## 2024-09-19 RX ADMIN — IPRATROPIUM BROMIDE 0.5 MG: 0.5 SOLUTION RESPIRATORY (INHALATION) at 05:35

## 2024-09-19 RX ADMIN — FAMOTIDINE 20 MG: 20 TABLET, FILM COATED ORAL at 08:37

## 2024-09-19 RX ADMIN — CALCIUM CARBONATE-VITAMIN D TAB 500 MG-200 UNIT 1 TABLET: 500-200 TAB at 08:36

## 2024-09-19 RX ADMIN — DOXYCYCLINE HYCLATE 100 MG: 100 CAPSULE ORAL at 08:36

## 2024-09-19 ASSESSMENT — PAIN SCALES - GENERAL: PAINLEVEL_OUTOF10: 0

## 2024-09-20 ENCOUNTER — CARE COORDINATION (OUTPATIENT)
Dept: CARE COORDINATION | Age: 79
End: 2024-09-20

## 2024-09-20 DIAGNOSIS — J96.21 ACUTE ON CHRONIC RESPIRATORY FAILURE WITH HYPOXIA (HCC): Primary | ICD-10-CM

## 2024-09-20 LAB
MICROORGANISM SPEC CULT: NORMAL
SERVICE CMNT-IMP: NORMAL
SPECIMEN DESCRIPTION: NORMAL

## 2024-09-20 PROCEDURE — 1111F DSCHRG MED/CURRENT MED MERGE: CPT | Performed by: INTERNAL MEDICINE

## 2024-09-24 ENCOUNTER — HOSPITAL ENCOUNTER (OUTPATIENT)
Age: 79
Discharge: HOME | End: 2024-09-24
Payer: MEDICARE

## 2024-09-24 DIAGNOSIS — J18.9 PNEUMONIA DUE TO INFECTIOUS ORGANISM, UNSPECIFIED LATERALITY, UNSPECIFIED PART OF LUNG: ICD-10-CM

## 2024-09-24 LAB
ANION GAP SERPL CALCULATED.3IONS-SCNC: 7 MMOL/L (ref 9–16)
BASOPHILS # BLD: <0.03 K/UL (ref 0–0.2)
BASOPHILS NFR BLD: 0 % (ref 0–2)
BUN SERPL-MCNC: 9 MG/DL (ref 8–23)
BUN/CREAT SERPL: 18 (ref 9–20)
CALCIUM SERPL-MCNC: 10 MG/DL (ref 8.6–10.4)
CHLORIDE SERPL-SCNC: 97 MMOL/L (ref 98–107)
CO2 SERPL-SCNC: 33 MMOL/L (ref 20–31)
CREAT SERPL-MCNC: 0.5 MG/DL (ref 0.5–0.9)
EOSINOPHIL # BLD: 0.05 K/UL (ref 0–0.44)
EOSINOPHILS RELATIVE PERCENT: 1 % (ref 1–4)
ERYTHROCYTE [DISTWIDTH] IN BLOOD BY AUTOMATED COUNT: 13.7 % (ref 11.8–14.4)
GFR, ESTIMATED: >90 ML/MIN/1.73M2
GLUCOSE SERPL-MCNC: 100 MG/DL (ref 74–99)
HCT VFR BLD AUTO: 36.7 % (ref 36.3–47.1)
HGB BLD-MCNC: 11.8 G/DL (ref 11.9–15.1)
IMM GRANULOCYTES # BLD AUTO: 0.05 K/UL (ref 0–0.3)
IMM GRANULOCYTES NFR BLD: 1 %
LYMPHOCYTES NFR BLD: 1.17 K/UL (ref 1.1–3.7)
LYMPHOCYTES RELATIVE PERCENT: 15 % (ref 24–43)
MCH RBC QN AUTO: 31.2 PG (ref 25.2–33.5)
MCHC RBC AUTO-ENTMCNC: 32.2 G/DL (ref 28.4–34.8)
MCV RBC AUTO: 97.1 FL (ref 82.6–102.9)
MONOCYTES NFR BLD: 0.58 K/UL (ref 0.1–1.2)
MONOCYTES NFR BLD: 8 % (ref 3–12)
NEUTROPHILS NFR BLD: 75 % (ref 36–65)
NEUTS SEG NFR BLD: 5.72 K/UL (ref 1.5–8.1)
NRBC BLD-RTO: 0 PER 100 WBC
PLATELET # BLD AUTO: 306 K/UL (ref 138–453)
PMV BLD AUTO: 9.8 FL (ref 8.1–13.5)
POTASSIUM SERPL-SCNC: 4.3 MMOL/L (ref 3.7–5.3)
RBC # BLD AUTO: 3.78 M/UL (ref 3.95–5.11)
SODIUM SERPL-SCNC: 137 MMOL/L (ref 136–145)
WBC OTHER # BLD: 7.6 K/UL (ref 3.5–11.3)

## 2024-09-24 PROCEDURE — 36415 COLL VENOUS BLD VENIPUNCTURE: CPT

## 2024-09-24 PROCEDURE — 85025 COMPLETE CBC W/AUTO DIFF WBC: CPT

## 2024-09-24 PROCEDURE — 80048 BASIC METABOLIC PNL TOTAL CA: CPT

## 2024-09-27 ENCOUNTER — CARE COORDINATION (OUTPATIENT)
Dept: CARE COORDINATION | Age: 79
End: 2024-09-27

## 2024-09-29 ENCOUNTER — APPOINTMENT (OUTPATIENT)
Dept: CT IMAGING | Age: 79
DRG: 871 | End: 2024-09-29
Payer: MEDICARE

## 2024-09-29 ENCOUNTER — HOSPITAL ENCOUNTER (INPATIENT)
Age: 79
LOS: 3 days | Discharge: NURSING, REHAB OR LONG-TERM CARE | DRG: 871 | End: 2024-10-02
Attending: EMERGENCY MEDICINE | Admitting: INTERNAL MEDICINE
Payer: MEDICARE

## 2024-09-29 ENCOUNTER — APPOINTMENT (OUTPATIENT)
Dept: GENERAL RADIOLOGY | Age: 79
DRG: 871 | End: 2024-09-29
Payer: MEDICARE

## 2024-09-29 DIAGNOSIS — J44.1 COPD EXACERBATION (HCC): ICD-10-CM

## 2024-09-29 DIAGNOSIS — J96.22 ACUTE ON CHRONIC RESPIRATORY FAILURE WITH HYPOXIA AND HYPERCAPNIA: ICD-10-CM

## 2024-09-29 DIAGNOSIS — G89.29 CHRONIC MIDLINE LOW BACK PAIN WITHOUT SCIATICA: Chronic | ICD-10-CM

## 2024-09-29 DIAGNOSIS — M54.50 CHRONIC MIDLINE LOW BACK PAIN WITHOUT SCIATICA: Chronic | ICD-10-CM

## 2024-09-29 DIAGNOSIS — J96.01 ACUTE RESPIRATORY FAILURE WITH HYPOXIA: Primary | ICD-10-CM

## 2024-09-29 DIAGNOSIS — I50.9 ACUTE ON CHRONIC CONGESTIVE HEART FAILURE, UNSPECIFIED HEART FAILURE TYPE (HCC): ICD-10-CM

## 2024-09-29 DIAGNOSIS — J18.9 PNEUMONIA OF RIGHT LOWER LOBE DUE TO INFECTIOUS ORGANISM: ICD-10-CM

## 2024-09-29 DIAGNOSIS — J96.21 ACUTE ON CHRONIC RESPIRATORY FAILURE WITH HYPOXIA AND HYPERCAPNIA: ICD-10-CM

## 2024-09-29 PROBLEM — I50.33 ACUTE ON CHRONIC DIASTOLIC CHF (CONGESTIVE HEART FAILURE) (HCC): Status: ACTIVE | Noted: 2024-09-29

## 2024-09-29 LAB
ANION GAP SERPL CALCULATED.3IONS-SCNC: 9 MMOL/L (ref 9–16)
ARTERIAL PATENCY WRIST A: ABNORMAL
BASOPHILS # BLD: <0.03 K/UL (ref 0–0.2)
BASOPHILS NFR BLD: 0 % (ref 0–2)
BNP SERPL-MCNC: 1365 PG/ML (ref 0–450)
BODY TEMPERATURE: 37
BUN SERPL-MCNC: 10 MG/DL (ref 8–23)
BUN/CREAT SERPL: 20 (ref 9–20)
CALCIUM SERPL-MCNC: 9.2 MG/DL (ref 8.6–10.4)
CHLORIDE SERPL-SCNC: 103 MMOL/L (ref 98–107)
CO2 SERPL-SCNC: 30 MMOL/L (ref 20–31)
CREAT SERPL-MCNC: 0.5 MG/DL (ref 0.5–0.9)
EOSINOPHIL # BLD: 0.05 K/UL (ref 0–0.44)
EOSINOPHILS RELATIVE PERCENT: 0 % (ref 1–4)
ERYTHROCYTE [DISTWIDTH] IN BLOOD BY AUTOMATED COUNT: 13.7 % (ref 11.8–14.4)
FIO2 ON VENT: 100 %
GAS FLOW.O2 O2 DELIVERY SYS: ABNORMAL L/MIN
GFR, ESTIMATED: >90 ML/MIN/1.73M2
GLUCOSE SERPL-MCNC: 140 MG/DL (ref 74–99)
HCO3 VENOUS: 28.8 MMOL/L (ref 24–30)
HCT VFR BLD AUTO: 38.7 % (ref 36.3–47.1)
HGB BLD-MCNC: 12.6 G/DL (ref 11.9–15.1)
IMM GRANULOCYTES # BLD AUTO: 0.03 K/UL (ref 0–0.3)
IMM GRANULOCYTES NFR BLD: 0 %
LACTATE BLDV-SCNC: 3.5 MMOL/L (ref 0.5–1.9)
LACTATE BLDV-SCNC: 4 MMOL/L (ref 0.5–1.9)
LYMPHOCYTES NFR BLD: 2.35 K/UL (ref 1.1–3.7)
LYMPHOCYTES RELATIVE PERCENT: 19 % (ref 24–43)
MCH RBC QN AUTO: 31.4 PG (ref 25.2–33.5)
MCHC RBC AUTO-ENTMCNC: 32.6 G/DL (ref 28.4–34.8)
MCV RBC AUTO: 96.5 FL (ref 82.6–102.9)
MONOCYTES NFR BLD: 0.41 K/UL (ref 0.1–1.2)
MONOCYTES NFR BLD: 3 % (ref 3–12)
NEUTROPHILS NFR BLD: 77 % (ref 36–65)
NEUTS SEG NFR BLD: 9.37 K/UL (ref 1.5–8.1)
NRBC BLD-RTO: 0 PER 100 WBC
O2 SAT, VEN: 64.9 % (ref 60–85)
PCO2 VENOUS: 50.1 MM HG (ref 39–55)
PCO2, VEN, TEMP ADJ: 50.1 MMHG (ref 39–55)
PH VENOUS: 7.38 (ref 7.32–7.42)
PH, VEN, TEMP ADJ: 7.38 (ref 7.32–7.42)
PLATELET # BLD AUTO: 421 K/UL (ref 138–453)
PMV BLD AUTO: 9.7 FL (ref 8.1–13.5)
PO2 VENOUS: 34.9 MM HG (ref 30–50)
PO2, VEN, TEMP ADJ: 34.9 MMHG (ref 30–50)
POSITIVE BASE EXCESS, VEN: 2.6 MMOL/L (ref 0–2)
POTASSIUM SERPL-SCNC: 3.7 MMOL/L (ref 3.7–5.3)
RBC # BLD AUTO: 4.01 M/UL (ref 3.95–5.11)
SARS-COV-2 RDRP RESP QL NAA+PROBE: NOT DETECTED
SODIUM SERPL-SCNC: 142 MMOL/L (ref 136–145)
SPECIMEN DESCRIPTION: NORMAL
TROPONIN I SERPL HS-MCNC: <6 NG/L (ref 0–14)
TROPONIN I SERPL HS-MCNC: <6 NG/L (ref 0–14)
WBC OTHER # BLD: 12.2 K/UL (ref 3.5–11.3)

## 2024-09-29 PROCEDURE — 82805 BLOOD GASES W/O2 SATURATION: CPT

## 2024-09-29 PROCEDURE — 96375 TX/PRO/DX INJ NEW DRUG ADDON: CPT

## 2024-09-29 PROCEDURE — 36415 COLL VENOUS BLD VENIPUNCTURE: CPT

## 2024-09-29 PROCEDURE — 2700000000 HC OXYGEN THERAPY PER DAY

## 2024-09-29 PROCEDURE — 99285 EMERGENCY DEPT VISIT HI MDM: CPT

## 2024-09-29 PROCEDURE — 94660 CPAP INITIATION&MGMT: CPT

## 2024-09-29 PROCEDURE — 80048 BASIC METABOLIC PNL TOTAL CA: CPT

## 2024-09-29 PROCEDURE — 2580000003 HC RX 258

## 2024-09-29 PROCEDURE — 6370000000 HC RX 637 (ALT 250 FOR IP): Performed by: INTERNAL MEDICINE

## 2024-09-29 PROCEDURE — 83880 ASSAY OF NATRIURETIC PEPTIDE: CPT

## 2024-09-29 PROCEDURE — 84484 ASSAY OF TROPONIN QUANT: CPT

## 2024-09-29 PROCEDURE — 94644 CONT INHLJ TX 1ST HOUR: CPT

## 2024-09-29 PROCEDURE — 6360000002 HC RX W HCPCS: Performed by: INTERNAL MEDICINE

## 2024-09-29 PROCEDURE — 94664 DEMO&/EVAL PT USE INHALER: CPT

## 2024-09-29 PROCEDURE — 87040 BLOOD CULTURE FOR BACTERIA: CPT

## 2024-09-29 PROCEDURE — 93005 ELECTROCARDIOGRAM TRACING: CPT | Performed by: EMERGENCY MEDICINE

## 2024-09-29 PROCEDURE — 2580000003 HC RX 258: Performed by: INTERNAL MEDICINE

## 2024-09-29 PROCEDURE — 6360000002 HC RX W HCPCS

## 2024-09-29 PROCEDURE — 6360000004 HC RX CONTRAST MEDICATION

## 2024-09-29 PROCEDURE — 2580000003 HC RX 258: Performed by: EMERGENCY MEDICINE

## 2024-09-29 PROCEDURE — 71045 X-RAY EXAM CHEST 1 VIEW: CPT

## 2024-09-29 PROCEDURE — 94640 AIRWAY INHALATION TREATMENT: CPT

## 2024-09-29 PROCEDURE — 6370000000 HC RX 637 (ALT 250 FOR IP): Performed by: EMERGENCY MEDICINE

## 2024-09-29 PROCEDURE — 6360000002 HC RX W HCPCS: Performed by: EMERGENCY MEDICINE

## 2024-09-29 PROCEDURE — 87635 SARS-COV-2 COVID-19 AMP PRB: CPT

## 2024-09-29 PROCEDURE — 5A09457 ASSISTANCE WITH RESPIRATORY VENTILATION, 24-96 CONSECUTIVE HOURS, CONTINUOUS POSITIVE AIRWAY PRESSURE: ICD-10-PCS | Performed by: INTERNAL MEDICINE

## 2024-09-29 PROCEDURE — 96365 THER/PROPH/DIAG IV INF INIT: CPT

## 2024-09-29 PROCEDURE — 2000000000 HC ICU R&B

## 2024-09-29 PROCEDURE — 94667 MNPJ CHEST WALL 1ST: CPT

## 2024-09-29 PROCEDURE — 94669 MECHANICAL CHEST WALL OSCILL: CPT

## 2024-09-29 PROCEDURE — 85025 COMPLETE CBC W/AUTO DIFF WBC: CPT

## 2024-09-29 PROCEDURE — 83605 ASSAY OF LACTIC ACID: CPT

## 2024-09-29 PROCEDURE — 71260 CT THORAX DX C+: CPT

## 2024-09-29 PROCEDURE — 94761 N-INVAS EAR/PLS OXIMETRY MLT: CPT

## 2024-09-29 RX ORDER — TRAZODONE HYDROCHLORIDE 50 MG/1
100 TABLET, FILM COATED ORAL NIGHTLY
Status: DISCONTINUED | OUTPATIENT
Start: 2024-09-29 | End: 2024-09-29

## 2024-09-29 RX ORDER — SODIUM CHLORIDE FOR INHALATION 0.9 %
3 VIAL, NEBULIZER (ML) INHALATION PRN
Status: DISCONTINUED | OUTPATIENT
Start: 2024-09-29 | End: 2024-09-29

## 2024-09-29 RX ORDER — MAGNESIUM SULFATE IN WATER 40 MG/ML
2000 INJECTION, SOLUTION INTRAVENOUS PRN
Status: DISCONTINUED | OUTPATIENT
Start: 2024-09-29 | End: 2024-10-02 | Stop reason: HOSPADM

## 2024-09-29 RX ORDER — IOPAMIDOL 755 MG/ML
75 INJECTION, SOLUTION INTRAVASCULAR
Status: COMPLETED | OUTPATIENT
Start: 2024-09-29 | End: 2024-09-29

## 2024-09-29 RX ORDER — ALBUTEROL SULFATE 0.83 MG/ML
7.5 SOLUTION RESPIRATORY (INHALATION) ONCE
Status: DISCONTINUED | OUTPATIENT
Start: 2024-09-29 | End: 2024-09-29

## 2024-09-29 RX ORDER — M-VIT,TX,IRON,MINS/CALC/FOLIC 27MG-0.4MG
1 TABLET ORAL DAILY
Status: DISCONTINUED | OUTPATIENT
Start: 2024-09-29 | End: 2024-10-02 | Stop reason: HOSPADM

## 2024-09-29 RX ORDER — PREGABALIN 50 MG/1
100 CAPSULE ORAL 2 TIMES DAILY
Status: DISCONTINUED | OUTPATIENT
Start: 2024-09-29 | End: 2024-09-30

## 2024-09-29 RX ORDER — CITALOPRAM HYDROBROMIDE 20 MG/1
40 TABLET ORAL DAILY
Status: DISCONTINUED | OUTPATIENT
Start: 2024-09-29 | End: 2024-09-29

## 2024-09-29 RX ORDER — POTASSIUM CHLORIDE 7.45 MG/ML
10 INJECTION INTRAVENOUS PRN
Status: DISCONTINUED | OUTPATIENT
Start: 2024-09-29 | End: 2024-10-02 | Stop reason: HOSPADM

## 2024-09-29 RX ORDER — POLYETHYLENE GLYCOL 3350 17 G/17G
17 POWDER, FOR SOLUTION ORAL DAILY PRN
Status: DISCONTINUED | OUTPATIENT
Start: 2024-09-29 | End: 2024-10-02 | Stop reason: HOSPADM

## 2024-09-29 RX ORDER — DIGOXIN 125 MCG
125 TABLET ORAL DAILY
Status: DISCONTINUED | OUTPATIENT
Start: 2024-09-29 | End: 2024-10-02 | Stop reason: HOSPADM

## 2024-09-29 RX ORDER — ASPIRIN 81 MG/1
81 TABLET, CHEWABLE ORAL DAILY
Status: DISCONTINUED | OUTPATIENT
Start: 2024-09-29 | End: 2024-10-02 | Stop reason: HOSPADM

## 2024-09-29 RX ORDER — ALBUTEROL SULFATE 0.83 MG/ML
2.5 SOLUTION RESPIRATORY (INHALATION) EVERY 4 HOURS PRN
Status: DISCONTINUED | OUTPATIENT
Start: 2024-09-29 | End: 2024-10-02 | Stop reason: HOSPADM

## 2024-09-29 RX ORDER — BUMETANIDE 0.25 MG/ML
2 INJECTION INTRAMUSCULAR; INTRAVENOUS DAILY
Status: DISCONTINUED | OUTPATIENT
Start: 2024-09-29 | End: 2024-10-01

## 2024-09-29 RX ORDER — SODIUM CHLORIDE 9 MG/ML
INJECTION, SOLUTION INTRAVENOUS PRN
Status: DISCONTINUED | OUTPATIENT
Start: 2024-09-29 | End: 2024-10-02 | Stop reason: HOSPADM

## 2024-09-29 RX ORDER — ALBUTEROL SULFATE 0.83 MG/ML
SOLUTION RESPIRATORY (INHALATION)
Status: COMPLETED
Start: 2024-09-29 | End: 2024-09-29

## 2024-09-29 RX ORDER — GUAIFENESIN 600 MG/1
600 TABLET, EXTENDED RELEASE ORAL 2 TIMES DAILY
Status: DISCONTINUED | OUTPATIENT
Start: 2024-09-29 | End: 2024-10-02 | Stop reason: HOSPADM

## 2024-09-29 RX ORDER — IPRATROPIUM BROMIDE AND ALBUTEROL SULFATE 2.5; .5 MG/3ML; MG/3ML
1 SOLUTION RESPIRATORY (INHALATION) EVERY 4 HOURS PRN
Status: DISCONTINUED | OUTPATIENT
Start: 2024-09-29 | End: 2024-09-29

## 2024-09-29 RX ORDER — ONDANSETRON 2 MG/ML
4 INJECTION INTRAMUSCULAR; INTRAVENOUS EVERY 6 HOURS PRN
Status: DISCONTINUED | OUTPATIENT
Start: 2024-09-29 | End: 2024-10-02 | Stop reason: HOSPADM

## 2024-09-29 RX ORDER — SODIUM CHLORIDE FOR INHALATION 0.9 %
3 VIAL, NEBULIZER (ML) INHALATION ONCE
Status: DISCONTINUED | OUTPATIENT
Start: 2024-09-29 | End: 2024-09-29

## 2024-09-29 RX ORDER — SODIUM CHLORIDE 0.9 % (FLUSH) 0.9 %
5-40 SYRINGE (ML) INJECTION EVERY 12 HOURS SCHEDULED
Status: DISCONTINUED | OUTPATIENT
Start: 2024-09-29 | End: 2024-10-02 | Stop reason: HOSPADM

## 2024-09-29 RX ORDER — ACETAMINOPHEN 650 MG/1
650 SUPPOSITORY RECTAL EVERY 6 HOURS PRN
Status: DISCONTINUED | OUTPATIENT
Start: 2024-09-29 | End: 2024-10-02 | Stop reason: HOSPADM

## 2024-09-29 RX ORDER — PREGABALIN 75 MG/1
300 CAPSULE ORAL 2 TIMES DAILY
Status: DISCONTINUED | OUTPATIENT
Start: 2024-09-29 | End: 2024-09-29

## 2024-09-29 RX ORDER — DULOXETIN HYDROCHLORIDE 60 MG/1
60 CAPSULE, DELAYED RELEASE ORAL DAILY
Status: DISCONTINUED | OUTPATIENT
Start: 2024-09-29 | End: 2024-10-02 | Stop reason: HOSPADM

## 2024-09-29 RX ORDER — IPRATROPIUM BROMIDE AND ALBUTEROL SULFATE 2.5; .5 MG/3ML; MG/3ML
1 SOLUTION RESPIRATORY (INHALATION) ONCE
Status: COMPLETED | OUTPATIENT
Start: 2024-09-29 | End: 2024-09-29

## 2024-09-29 RX ORDER — ONDANSETRON 4 MG/1
4 TABLET, ORALLY DISINTEGRATING ORAL EVERY 8 HOURS PRN
Status: DISCONTINUED | OUTPATIENT
Start: 2024-09-29 | End: 2024-10-02 | Stop reason: HOSPADM

## 2024-09-29 RX ORDER — HYDROCODONE BITARTRATE AND ACETAMINOPHEN 10; 325 MG/1; MG/1
1 TABLET ORAL 3 TIMES DAILY PRN
Status: DISCONTINUED | OUTPATIENT
Start: 2024-09-29 | End: 2024-09-29

## 2024-09-29 RX ORDER — SODIUM CHLORIDE 0.9 % (FLUSH) 0.9 %
10 SYRINGE (ML) INJECTION PRN
Status: DISCONTINUED | OUTPATIENT
Start: 2024-09-29 | End: 2024-10-02 | Stop reason: HOSPADM

## 2024-09-29 RX ORDER — ACETAMINOPHEN 325 MG/1
650 TABLET ORAL EVERY 6 HOURS PRN
Status: DISCONTINUED | OUTPATIENT
Start: 2024-09-29 | End: 2024-10-02 | Stop reason: HOSPADM

## 2024-09-29 RX ORDER — ALBUTEROL SULFATE 0.83 MG/ML
7.5 SOLUTION RESPIRATORY (INHALATION) EVERY 6 HOURS PRN
Status: DISCONTINUED | OUTPATIENT
Start: 2024-09-29 | End: 2024-09-29

## 2024-09-29 RX ORDER — POTASSIUM CHLORIDE 1500 MG/1
40 TABLET, EXTENDED RELEASE ORAL PRN
Status: DISCONTINUED | OUTPATIENT
Start: 2024-09-29 | End: 2024-10-02 | Stop reason: HOSPADM

## 2024-09-29 RX ORDER — DILTIAZEM HYDROCHLORIDE 120 MG/1
120 CAPSULE, COATED, EXTENDED RELEASE ORAL DAILY
Status: DISCONTINUED | OUTPATIENT
Start: 2024-09-29 | End: 2024-10-02 | Stop reason: HOSPADM

## 2024-09-29 RX ORDER — IPRATROPIUM BROMIDE AND ALBUTEROL SULFATE 2.5; .5 MG/3ML; MG/3ML
1 SOLUTION RESPIRATORY (INHALATION)
Status: DISCONTINUED | OUTPATIENT
Start: 2024-09-29 | End: 2024-10-02 | Stop reason: HOSPADM

## 2024-09-29 RX ORDER — LEVOTHYROXINE SODIUM 100 UG/1
200 TABLET ORAL DAILY
Status: DISCONTINUED | OUTPATIENT
Start: 2024-09-29 | End: 2024-10-02 | Stop reason: HOSPADM

## 2024-09-29 RX ORDER — POTASSIUM CHLORIDE 1500 MG/1
20 TABLET, EXTENDED RELEASE ORAL 2 TIMES DAILY
Status: DISCONTINUED | OUTPATIENT
Start: 2024-09-29 | End: 2024-10-02 | Stop reason: HOSPADM

## 2024-09-29 RX ORDER — ALENDRONATE SODIUM 70 MG/1
70 TABLET ORAL WEEKLY
Status: DISCONTINUED | OUTPATIENT
Start: 2024-09-29 | End: 2024-09-29

## 2024-09-29 RX ADMIN — GUAIFENESIN 600 MG: 600 TABLET, EXTENDED RELEASE ORAL at 20:57

## 2024-09-29 RX ADMIN — IPRATROPIUM BROMIDE 0.5 MG: 0.5 SOLUTION RESPIRATORY (INHALATION) at 06:53

## 2024-09-29 RX ADMIN — BUMETANIDE 2 MG: 0.25 INJECTION INTRAMUSCULAR; INTRAVENOUS at 11:18

## 2024-09-29 RX ADMIN — SODIUM CHLORIDE, PRESERVATIVE FREE 10 ML: 5 INJECTION INTRAVENOUS at 11:19

## 2024-09-29 RX ADMIN — PREGABALIN 100 MG: 50 CAPSULE ORAL at 20:57

## 2024-09-29 RX ADMIN — IOPAMIDOL 75 ML: 755 INJECTION, SOLUTION INTRAVENOUS at 21:41

## 2024-09-29 RX ADMIN — SODIUM CHLORIDE, PRESERVATIVE FREE 10 ML: 5 INJECTION INTRAVENOUS at 20:57

## 2024-09-29 RX ADMIN — VANCOMYCIN HYDROCHLORIDE 1000 MG: 1 INJECTION, POWDER, LYOPHILIZED, FOR SOLUTION INTRAVENOUS at 10:15

## 2024-09-29 RX ADMIN — ALBUTEROL SULFATE 7.5 MG: 2.5 SOLUTION RESPIRATORY (INHALATION) at 06:53

## 2024-09-29 RX ADMIN — IPRATROPIUM BROMIDE AND ALBUTEROL SULFATE 1 DOSE: 2.5; .5 SOLUTION RESPIRATORY (INHALATION) at 06:22

## 2024-09-29 RX ADMIN — CALCIUM CARBONATE-VITAMIN D TAB 500 MG-200 UNIT 1 TABLET: 500-200 TAB at 12:30

## 2024-09-29 RX ADMIN — RIVAROXABAN 20 MG: 20 TABLET, FILM COATED ORAL at 12:29

## 2024-09-29 RX ADMIN — CEFEPIME 2000 MG: 2 INJECTION, POWDER, FOR SOLUTION INTRAVENOUS at 17:29

## 2024-09-29 RX ADMIN — DIGOXIN 125 MCG: 125 TABLET ORAL at 12:30

## 2024-09-29 RX ADMIN — CEFEPIME 2000 MG: 2 INJECTION, POWDER, FOR SOLUTION INTRAVENOUS at 09:20

## 2024-09-29 RX ADMIN — POTASSIUM CHLORIDE 20 MEQ: 1500 TABLET, EXTENDED RELEASE ORAL at 20:57

## 2024-09-29 RX ADMIN — Medication 3 ML: at 06:54

## 2024-09-29 RX ADMIN — VANCOMYCIN HYDROCHLORIDE 1000 MG: 1 INJECTION, POWDER, LYOPHILIZED, FOR SOLUTION INTRAVENOUS at 23:05

## 2024-09-29 RX ADMIN — Medication 1 TABLET: at 12:30

## 2024-09-29 RX ADMIN — WATER 125 MG: 1 INJECTION INTRAMUSCULAR; INTRAVENOUS; SUBCUTANEOUS at 06:19

## 2024-09-29 RX ADMIN — IPRATROPIUM BROMIDE AND ALBUTEROL SULFATE 1 DOSE: 2.5; .5 SOLUTION RESPIRATORY (INHALATION) at 15:13

## 2024-09-29 RX ADMIN — IPRATROPIUM BROMIDE AND ALBUTEROL SULFATE 1 DOSE: 2.5; .5 SOLUTION RESPIRATORY (INHALATION) at 20:29

## 2024-09-29 RX ADMIN — LEVOTHYROXINE SODIUM 200 MCG: 100 TABLET ORAL at 12:30

## 2024-09-29 RX ADMIN — ALBUTEROL SULFATE 7.5 MG: 2.5 SOLUTION RESPIRATORY (INHALATION) at 06:52

## 2024-09-29 RX ADMIN — DILTIAZEM HYDROCHLORIDE 120 MG: 120 CAPSULE, COATED, EXTENDED RELEASE ORAL at 12:30

## 2024-09-29 RX ADMIN — GUAIFENESIN 600 MG: 600 TABLET, EXTENDED RELEASE ORAL at 12:30

## 2024-09-29 RX ADMIN — ASPIRIN 81 MG: 81 TABLET, CHEWABLE ORAL at 12:30

## 2024-09-29 RX ADMIN — DULOXETINE HYDROCHLORIDE 60 MG: 60 CAPSULE, DELAYED RELEASE ORAL at 12:29

## 2024-09-29 ASSESSMENT — PAIN SCALES - GENERAL: PAINLEVEL_OUTOF10: 0

## 2024-09-29 NOTE — ED PROVIDER NOTES
(CELEXA) 20 MG tablet Take 2 tablets by mouth daily 4/29/24  Yes Hermila Gabriel APRN - CNP   sertraline (ZOLOFT) 50 MG tablet take 1 tablet by mouth once daily 3/18/24  Yes Jong Moraes MD   ipratropium 0.5 mg-albuterol 2.5 mg (DUONEB) 0.5-2.5 (3) MG/3ML SOLN nebulizer solution inhale contents of 1 vial ( 3 MLS ) in nebulizer by mouth and INTO THE LUNGS every 4 hours if needed for shortness of breath or wheezing 1/23/24  Yes Jong Moraes MD   alendronate (FOSAMAX) 70 MG tablet TAKE 1 TABLET BY MOUTH WEEKLY  WITH 8 OZ OF PLAIN WATER 30  MINUTES BEFORE FIRST FOOD, DRINK OR MEDS. STAY UPRIGHT FOR 30  MINS (TAKE ON SUNDAYS) 12/18/23  Yes Jong Moraes MD   DULoxetine (CYMBALTA) 60 MG extended release capsule TAKE 1 CAPSULE BY MOUTH DAILY 12/12/23  Yes Jong Moraes MD   XARELTO 20 MG TABS tablet TAKE 1 TABLET BY MOUTH DAILY  WITH BREAKFAST 10/2/23  Yes Yris Coyne PA-C   calcium citrate-vitamin D (CITRACAL+D) 315-5 MG-MCG TABS per tablet Take 1 tablet by mouth daily   Yes ProviderGaby MD   Multiple Vitamins-Minerals (THERAPEUTIC MULTIVITAMIN-MINERALS) tablet Take 1 tablet by mouth daily   Yes ProviderGaby MD   OXYGEN Inhale 4 L into the lungs continuous   Yes Provider, MD Gaby   vitamin C (ASCORBIC ACID) 500 MG tablet Take 1 tablet by mouth 2 times daily   Yes Provider, MD Gaby   bumetanide (BUMEX) 2 MG tablet take 1 tablet by mouth once daily  Patient taking differently: Take 1 tablet by mouth daily as needed 6/12/23  Yes Jong Moraes MD   tiZANidine (ZANAFLEX) 4 MG tablet Take 1 tablet by mouth as needed 4/21/23  Yes Gaby lCemons MD   guaiFENesin (MUCINEX) 600 MG extended release tablet Take 1 tablet by mouth 2 times daily 2/25/23  Yes Hitesh Jason, APRN - NP   NONFORMULARY Pt on pump with Bupivacaine 6.178mg/day, Hydromorphone 3.089mg/day   Yes Provider, Gaby, MD   ASPIRIN 81 PO Take 81 mg by mouth daily   Yes

## 2024-09-29 NOTE — ED NOTES
Adult Non-Invasive Positive Pressure Ventilation for Acute Respiratory Distress  Patient & Family Education Note     Patient: Toshia South  Age: 79 y.o.     The patient and/or family has been educated on the following items and have verbalized understanding and agreement:    [x]Patient and/or family have been educated on the purpose and advantages of NIV and have verbalized understanding and agreement.  [x]Patient and/or family is in agreement that the patient will be NPO (Nothing by Mouth) for the duration of NIV use.  [x]Patient and/or  family is in agreement that NIV will not be routinely disrupted except to complete oral care.  [x]Patient and/or family have been educated on the level of care, vital signs frequency and monitoring requirements for NIV and are in agreement.  [x]Patient and/or family have been educated on reporting any nausea, chest discomfort, sudden increase in shortness of breath, or a severe headache to the staff immediately.

## 2024-09-29 NOTE — CONSULTS
+  [facility] Pharmacy Department    Clinical Pharmacy Consult              Toshia South is a 79 y.o. female whose chart and medications have been reviewed.    Reason for medication review: Antibiotics: Cefepime    Requesting Physician: Dr Rizo    Recommendations:     1. Cefepime 2000 mg IV (infuse over 4 hours) q 8 hr       Recent Labs     09/29/24  0549   CREATININE 0.5     Estimated Creatinine Clearance: 83 mL/min (based on SCr of 0.5 mg/dL).      CrCl Dose   >60 1-2 gm q8-12hrs over 4 hours   30-59 or CRRT 1-2gm [id number] over 4 hours   <30 500mg-2gm q24hrs over 4 hours or 1gm q12hrs over 4 hours   <10 or HD 500mg-1gm q24hrs over 4 hours     Dose    Cefepime   2000 gm  IV q 8 hrs  over 4 hours.      Pharmacists should be contacted for issues concerning drug compatibility with multiple IV medications.  All doses will be prepared using 50 ml bag to be infused over 4-hours at a rate of 12.5 ml/hr.    Thank You,  Marjorie Cook, PharmD 9/29/2024 10:26 AM      
   vancomycin 1000 mg IVPB in 250 mL NS addavial (mg) 1,000 mg New Bag 09/29/24 1015                  Culture Date / Source  /  Results  9/29/24         Blood x 2     In process    MRSA Nasal Swab: showed MRSA positive result on 8/2/23, 6/10/24 .    PLAN   Initial loading dose 1000 mg  x 1 in ER at 1015, then vancomycin 1000 mg IV every 12 hours. Estimated at steady state  and trough 15  Ensured BUN/sCr ordered at baseline and every 48 hours x at least 3 levels, then at least weekly.  Vancomycin level ordered for 10/1/24 @ 0500. Will use bayesian method for dosing.  This level will not be a trough.  Target AUC/ROBERTO 400-500, with trough goal estimate of 10-15.      Vancomycin Target Concentration Parameters  Treatment  Population Target AUC/ROBERTO Target Trough   Invasive MRSA Infection (bacteremia, pneumonia, meningitis, endocarditis, osteomyelitis)  Sepsis (undifferentiated) 400-600 N/A   Infection due to non-MRSA pathogen  Empiric treatment of non-invasive MRSA infection  (SSTI, UTI) <500 10-15 mg/L   CrCl < 29 mL/min  Rapidly fluctuating serum creatinine   CARLITA N/A < 15 mg/L     Renal replacement therapy is dosed by levels, per hospital protocol.  Abbreviations  * Pauc: probability that AUC is >400 (efficacy); Pconc: probability that Ctrough is above 20 ?g/mL (toxicity); Tox: Probability of nephrotoxicity, based on Wiliam et al. Clin Infect Dis 2009.    Pharmacy will continue to follow.    Thank you for the consult.      Marjorie Cook, PharmD 9/29/2024 11:45 AM

## 2024-09-29 NOTE — ED NOTES
[facility]  EMERGENCY DEPARTMENT TRANSITION OF CARE    Pt Name: Toshia South  MRN: [id number]  Birthdate 1945  Date of evaluation: 9/29/2024  Provider: Shasta Solis MD    Care was assumed from Dr. Poon at 7 AM.    SUMMARY OF CARE   Patient with history of COPD on chronic oxygen, and idiopathic pulmonary fibrosis. Diffusely wheezy on exam, will place on BiPAP check VBG, start steroids. She is already had multiple aerosol treatments at this time but still diffusely wheezy so will place on continuous nebs. She got 2 g of magnesium. Will plan on chest x-ray she does have a slight cough with some mucus production so we will check for infectious etiology. She is anticoagulated to low concern for PE.       PLAN AS DISCUSSED WITH PRIOR CLINICIAN     Repeat delta troponin and admit to ICU    MY PHYSICAL EXAM       Physical Exam  Constitutional:       General: She is not in acute distress.     Appearance: Normal appearance. She is normal weight. She is ill-appearing. She is not toxic-appearing or diaphoretic.   HENT:      Head: Normocephalic and atraumatic.      Right Ear: External ear normal.      Left Ear: External ear normal.      Nose: Nose normal. No congestion or rhinorrhea.      Mouth/Throat:      Mouth: Mucous membranes are moist.      Pharynx: Oropharynx is clear. No oropharyngeal exudate or posterior oropharyngeal erythema.   Eyes:      Conjunctiva/sclera: Conjunctivae normal.      Pupils: Pupils are equal, round, and reactive to light.   Cardiovascular:      Rate and Rhythm: Regular rhythm. Tachycardia present.      Pulses: Normal pulses.      Heart sounds: Normal heart sounds.   Pulmonary:      Effort: Pulmonary effort is normal.      Breath sounds: Rales present.   Abdominal:      General: Abdomen is flat. Bowel sounds are normal. There is no distension.      Palpations: Abdomen is soft. There is no mass.      Tenderness: There is no abdominal tenderness. There is no right CVA tenderness, left

## 2024-09-30 LAB
ALBUMIN SERPL-MCNC: 3.3 G/DL (ref 3.5–5.2)
ALBUMIN/GLOB SERPL: 1 {RATIO} (ref 1–2.5)
ALP SERPL-CCNC: 73 U/L (ref 35–104)
ALT SERPL-CCNC: 8 U/L (ref 10–35)
ANION GAP SERPL CALCULATED.3IONS-SCNC: 6 MMOL/L (ref 9–16)
AST SERPL-CCNC: 13 U/L (ref 10–35)
B PARAP IS1001 DNA NPH QL NAA+NON-PROBE: NOT DETECTED
B PERT DNA SPEC QL NAA+PROBE: NOT DETECTED
BASOPHILS # BLD: <0.03 K/UL (ref 0–0.2)
BASOPHILS NFR BLD: 0 % (ref 0–2)
BILIRUB SERPL-MCNC: 0.2 MG/DL (ref 0–1.2)
BNP SERPL-MCNC: 3479 PG/ML (ref 0–450)
BUN SERPL-MCNC: 13 MG/DL (ref 8–23)
BUN/CREAT SERPL: 33 (ref 9–20)
C PNEUM DNA NPH QL NAA+NON-PROBE: NOT DETECTED
CALCIUM SERPL-MCNC: 9.1 MG/DL (ref 8.6–10.4)
CHLORIDE SERPL-SCNC: 99 MMOL/L (ref 98–107)
CO2 SERPL-SCNC: 32 MMOL/L (ref 20–31)
CREAT SERPL-MCNC: 0.4 MG/DL (ref 0.5–0.9)
CRP SERPL HS-MCNC: 88.1 MG/L (ref 0–5)
EKG ATRIAL RATE: 112 BPM
EKG P AXIS: 50 DEGREES
EKG P-R INTERVAL: 168 MS
EKG Q-T INTERVAL: 328 MS
EKG QRS DURATION: 78 MS
EKG QTC CALCULATION (BAZETT): 447 MS
EKG R AXIS: -24 DEGREES
EKG T AXIS: 82 DEGREES
EKG VENTRICULAR RATE: 112 BPM
EOSINOPHIL # BLD: <0.03 K/UL (ref 0–0.44)
EOSINOPHILS RELATIVE PERCENT: 0 % (ref 1–4)
ERYTHROCYTE [DISTWIDTH] IN BLOOD BY AUTOMATED COUNT: 14.1 % (ref 11.8–14.4)
FLUAV RNA NPH QL NAA+NON-PROBE: NOT DETECTED
FLUBV RNA NPH QL NAA+NON-PROBE: NOT DETECTED
GFR, ESTIMATED: >90 ML/MIN/1.73M2
GLUCOSE SERPL-MCNC: 105 MG/DL (ref 74–99)
HADV DNA NPH QL NAA+NON-PROBE: NOT DETECTED
HCOV 229E RNA NPH QL NAA+NON-PROBE: NOT DETECTED
HCOV HKU1 RNA NPH QL NAA+NON-PROBE: NOT DETECTED
HCOV NL63 RNA NPH QL NAA+NON-PROBE: NOT DETECTED
HCOV OC43 RNA NPH QL NAA+NON-PROBE: NOT DETECTED
HCT VFR BLD AUTO: 32.8 % (ref 36.3–47.1)
HGB BLD-MCNC: 10.7 G/DL (ref 11.9–15.1)
HMPV RNA NPH QL NAA+NON-PROBE: NOT DETECTED
HPIV1 RNA NPH QL NAA+NON-PROBE: NOT DETECTED
HPIV2 RNA NPH QL NAA+NON-PROBE: NOT DETECTED
HPIV3 RNA NPH QL NAA+NON-PROBE: NOT DETECTED
HPIV4 RNA NPH QL NAA+NON-PROBE: NOT DETECTED
IMM GRANULOCYTES # BLD AUTO: 0.06 K/UL (ref 0–0.3)
IMM GRANULOCYTES NFR BLD: 0 %
LYMPHOCYTES NFR BLD: 0.79 K/UL (ref 1.1–3.7)
LYMPHOCYTES RELATIVE PERCENT: 6 % (ref 24–43)
M PNEUMO DNA NPH QL NAA+NON-PROBE: NOT DETECTED
MCH RBC QN AUTO: 31.6 PG (ref 25.2–33.5)
MCHC RBC AUTO-ENTMCNC: 32.6 G/DL (ref 28.4–34.8)
MCV RBC AUTO: 96.8 FL (ref 82.6–102.9)
MONOCYTES NFR BLD: 0.59 K/UL (ref 0.1–1.2)
MONOCYTES NFR BLD: 4 % (ref 3–12)
NEUTROPHILS NFR BLD: 90 % (ref 36–65)
NEUTS SEG NFR BLD: 12.71 K/UL (ref 1.5–8.1)
NRBC BLD-RTO: 0 PER 100 WBC
PLATELET # BLD AUTO: 343 K/UL (ref 138–453)
PMV BLD AUTO: 9.7 FL (ref 8.1–13.5)
POTASSIUM SERPL-SCNC: 4.3 MMOL/L (ref 3.7–5.3)
PROT SERPL-MCNC: 6.7 G/DL (ref 6.6–8.7)
RBC # BLD AUTO: 3.39 M/UL (ref 3.95–5.11)
RSV RNA NPH QL NAA+NON-PROBE: NOT DETECTED
RV+EV RNA NPH QL NAA+NON-PROBE: NOT DETECTED
SARS-COV-2 RNA NPH QL NAA+NON-PROBE: NOT DETECTED
SODIUM SERPL-SCNC: 137 MMOL/L (ref 136–145)
SPECIMEN DESCRIPTION: NORMAL
WBC OTHER # BLD: 14.2 K/UL (ref 3.5–11.3)

## 2024-09-30 PROCEDURE — 6370000000 HC RX 637 (ALT 250 FOR IP): Performed by: INTERNAL MEDICINE

## 2024-09-30 PROCEDURE — 2700000000 HC OXYGEN THERAPY PER DAY

## 2024-09-30 PROCEDURE — 93010 ELECTROCARDIOGRAM REPORT: CPT | Performed by: FAMILY MEDICINE

## 2024-09-30 PROCEDURE — 94660 CPAP INITIATION&MGMT: CPT

## 2024-09-30 PROCEDURE — 97166 OT EVAL MOD COMPLEX 45 MIN: CPT

## 2024-09-30 PROCEDURE — 86140 C-REACTIVE PROTEIN: CPT

## 2024-09-30 PROCEDURE — 97162 PT EVAL MOD COMPLEX 30 MIN: CPT

## 2024-09-30 PROCEDURE — 94669 MECHANICAL CHEST WALL OSCILL: CPT

## 2024-09-30 PROCEDURE — 94664 DEMO&/EVAL PT USE INHALER: CPT

## 2024-09-30 PROCEDURE — 80053 COMPREHEN METABOLIC PANEL: CPT

## 2024-09-30 PROCEDURE — 83880 ASSAY OF NATRIURETIC PEPTIDE: CPT

## 2024-09-30 PROCEDURE — 2580000003 HC RX 258: Performed by: INTERNAL MEDICINE

## 2024-09-30 PROCEDURE — 0202U NFCT DS 22 TRGT SARS-COV-2: CPT

## 2024-09-30 PROCEDURE — 6370000000 HC RX 637 (ALT 250 FOR IP)

## 2024-09-30 PROCEDURE — 94761 N-INVAS EAR/PLS OXIMETRY MLT: CPT

## 2024-09-30 PROCEDURE — 6360000002 HC RX W HCPCS: Performed by: INTERNAL MEDICINE

## 2024-09-30 PROCEDURE — 94640 AIRWAY INHALATION TREATMENT: CPT

## 2024-09-30 PROCEDURE — 85025 COMPLETE CBC W/AUTO DIFF WBC: CPT

## 2024-09-30 PROCEDURE — 2000000000 HC ICU R&B

## 2024-09-30 PROCEDURE — 92610 EVALUATE SWALLOWING FUNCTION: CPT

## 2024-09-30 PROCEDURE — 36415 COLL VENOUS BLD VENIPUNCTURE: CPT

## 2024-09-30 RX ORDER — PREGABALIN 75 MG/1
300 CAPSULE ORAL 2 TIMES DAILY
Status: DISCONTINUED | OUTPATIENT
Start: 2024-09-30 | End: 2024-10-02 | Stop reason: HOSPADM

## 2024-09-30 RX ORDER — TRAZODONE HYDROCHLORIDE 50 MG/1
100 TABLET, FILM COATED ORAL NIGHTLY
Status: DISCONTINUED | OUTPATIENT
Start: 2024-09-30 | End: 2024-10-01

## 2024-09-30 RX ORDER — CITALOPRAM HYDROBROMIDE 20 MG/1
40 TABLET ORAL DAILY
Status: DISCONTINUED | OUTPATIENT
Start: 2024-10-01 | End: 2024-10-02 | Stop reason: HOSPADM

## 2024-09-30 RX ADMIN — LEVOTHYROXINE SODIUM 200 MCG: 100 TABLET ORAL at 06:29

## 2024-09-30 RX ADMIN — GUAIFENESIN 600 MG: 600 TABLET, EXTENDED RELEASE ORAL at 08:20

## 2024-09-30 RX ADMIN — Medication 1 TABLET: at 08:20

## 2024-09-30 RX ADMIN — CEFEPIME 2000 MG: 2 INJECTION, POWDER, FOR SOLUTION INTRAVENOUS at 09:49

## 2024-09-30 RX ADMIN — PREGABALIN 200 MG: 75 CAPSULE ORAL at 22:04

## 2024-09-30 RX ADMIN — IPRATROPIUM BROMIDE AND ALBUTEROL SULFATE 1 DOSE: 2.5; .5 SOLUTION RESPIRATORY (INHALATION) at 05:47

## 2024-09-30 RX ADMIN — DIGOXIN 125 MCG: 125 TABLET ORAL at 08:20

## 2024-09-30 RX ADMIN — GUAIFENESIN 600 MG: 600 TABLET, EXTENDED RELEASE ORAL at 22:04

## 2024-09-30 RX ADMIN — CALCIUM CARBONATE-VITAMIN D TAB 500 MG-200 UNIT 1 TABLET: 500-200 TAB at 08:21

## 2024-09-30 RX ADMIN — SODIUM CHLORIDE, PRESERVATIVE FREE 10 ML: 5 INJECTION INTRAVENOUS at 22:16

## 2024-09-30 RX ADMIN — IPRATROPIUM BROMIDE AND ALBUTEROL SULFATE 1 DOSE: 2.5; .5 SOLUTION RESPIRATORY (INHALATION) at 10:01

## 2024-09-30 RX ADMIN — CEFEPIME 2000 MG: 2 INJECTION, POWDER, FOR SOLUTION INTRAVENOUS at 03:09

## 2024-09-30 RX ADMIN — VANCOMYCIN HYDROCHLORIDE 1000 MG: 1 INJECTION, POWDER, LYOPHILIZED, FOR SOLUTION INTRAVENOUS at 22:40

## 2024-09-30 RX ADMIN — RIVAROXABAN 20 MG: 20 TABLET, FILM COATED ORAL at 08:21

## 2024-09-30 RX ADMIN — SODIUM CHLORIDE, PRESERVATIVE FREE 10 ML: 5 INJECTION INTRAVENOUS at 08:21

## 2024-09-30 RX ADMIN — ASPIRIN 81 MG: 81 TABLET, CHEWABLE ORAL at 08:20

## 2024-09-30 RX ADMIN — TRAZODONE HYDROCHLORIDE 100 MG: 50 TABLET ORAL at 22:16

## 2024-09-30 RX ADMIN — PREGABALIN 100 MG: 50 CAPSULE ORAL at 08:20

## 2024-09-30 RX ADMIN — DILTIAZEM HYDROCHLORIDE 120 MG: 120 CAPSULE, COATED, EXTENDED RELEASE ORAL at 08:21

## 2024-09-30 RX ADMIN — POTASSIUM CHLORIDE 20 MEQ: 1500 TABLET, EXTENDED RELEASE ORAL at 08:21

## 2024-09-30 RX ADMIN — POTASSIUM CHLORIDE 20 MEQ: 1500 TABLET, EXTENDED RELEASE ORAL at 22:04

## 2024-09-30 RX ADMIN — DULOXETINE HYDROCHLORIDE 60 MG: 60 CAPSULE, DELAYED RELEASE ORAL at 08:21

## 2024-09-30 RX ADMIN — IPRATROPIUM BROMIDE AND ALBUTEROL SULFATE 1 DOSE: 2.5; .5 SOLUTION RESPIRATORY (INHALATION) at 15:34

## 2024-09-30 RX ADMIN — CEFEPIME 2000 MG: 2 INJECTION, POWDER, FOR SOLUTION INTRAVENOUS at 17:22

## 2024-09-30 RX ADMIN — BUMETANIDE 2 MG: 0.25 INJECTION INTRAMUSCULAR; INTRAVENOUS at 08:20

## 2024-09-30 RX ADMIN — PREGABALIN 100 MG: 50 CAPSULE ORAL at 20:55

## 2024-09-30 RX ADMIN — VANCOMYCIN HYDROCHLORIDE 1000 MG: 1 INJECTION, POWDER, LYOPHILIZED, FOR SOLUTION INTRAVENOUS at 08:45

## 2024-09-30 RX ADMIN — IPRATROPIUM BROMIDE AND ALBUTEROL SULFATE 1 DOSE: 2.5; .5 SOLUTION RESPIRATORY (INHALATION) at 20:31

## 2024-09-30 ASSESSMENT — PAIN SCALES - GENERAL
PAINLEVEL_OUTOF10: 7
PAINLEVEL_OUTOF10: 7
PAINLEVEL_OUTOF10: 0
PAINLEVEL_OUTOF10: 0

## 2024-09-30 ASSESSMENT — PAIN DESCRIPTION - LOCATION: LOCATION: BACK;LEG;CHEST

## 2024-09-30 NOTE — THERAPY EVALUATION
[facility]  SPEECH THERAPY  [facility] ICU  Bedside Swallow Examination    DIET ORDER RECOMMENDATIONS AFTER EVALUATION: regular and mildly-thick liquids    Date: 2024  Patient Name: Toshia South      CSN: [id number]   : 1945  (79 y.o.)  Gender: female   Referring Physician:  Jong Moraes MD    Diagnosis: Multifocal pneumonia    History of Present Illness/Injury: Patient admitted due to respiratory distress. Patient also exhibiting wheezing and coughing. Patient recently admitted for similar difficulties.  Past Medical History:   Diagnosis Date    A-fib (HCC)     Anxiety     Atrial fibrillation (HCC)     Biventricular congestive heart failure (HCC)     Bronchiectasis with acute exacerbation (HCC) 2022    CAD (coronary artery disease)     Chronic pain syndrome     dilaudid infusion pump    COPD (chronic obstructive pulmonary disease) (HCC)     Depression     GERD (gastroesophageal reflux disease)     Hypothyroidism     Impaired fasting glucose     Iron deficiency anemia     Osteoporosis     Pulmonary fibrosis (HCC) 2022    Subdural hematoma 2022       SUBJECTIVE:  Patient seen upright in bed with breakfast meal. BiPAP removed and patient placed on 4L of O2 via nasal cannula. Patient reports being compliant with diet at home but has difficulties swallowing due to SOB.    OBJECTIVE:    Pain:  No pain reported.    Current Diet: Regular and mildly-thick liquids    Respiratory Status:  Nasal Canula: 4L    Behavioral Observation:  Alert, Oriented, and Lethargic    CRANIAL NERVE ASSESSMENT   CN V (Trigeminal) Closes and Opens Mandible WFL    Rotary Jaw Movement WFL      CN VII (Facial) Cheeks Hold Food out of Sulci WFL    Opens, Closes/Seals, Protrudes, Retracts Lips WFL    General Appearance WFL    Sensation Not Tested      CN X (Vagus - Pharyngeal) Raises Back of Tongue WFL      CN XI (Accessory) Lifts Soft Palate WFL      CN XII (Hypoglossal) Elevates Tongue Up and Back WFL

## 2024-09-30 NOTE — H&P
daily as needed for Cough 12/19/23   Mehreen Arambula APRN - CNP   HYDROcodone-acetaminophen (NORCO)  MG per tablet take 1 tablet by mouth every 8 hours if needed for pain for up to 30 DAYS 1/21/23   Provider, MD Gaby       Allergies:  Seasonal    Social History:   TOBACCO:   reports that she quit smoking about 47 years ago. Her smoking use included cigarettes. She started smoking about 57 years ago. She has a 10 pack-year smoking history. She has never used smokeless tobacco.  ETOH:   reports no history of alcohol use.    Family History:       Problem Relation Age of Onset    Heart Attack Father 63    Heart Attack Sister     Heart Disease Brother        Allergies:  Seasonal    Medications Prior to Admission:    Prior to Admission medications    Medication Sig Start Date End Date Taking? Authorizing Provider   nystatin (MYCOSTATIN) 352361 UNIT/ML suspension Take 5 mLs by mouth 4 times daily for 3 days Retain in mouth as long as possible 9/27/24 9/30/24 Yes Mehreen Arambula APRN - CNP   dilTIAZem (CARDIZEM CD) 120 MG extended release capsule Take 1 capsule by mouth daily 9/20/24  Yes Linnette Alvarez APRN - CNP   digoxin (LANOXIN) 125 MCG tablet Take 1 tablet by mouth daily 9/20/24  Yes Linnette Alvarez APRN - CNP   potassium chloride (KLOR-CON M) 20 MEQ extended release tablet TAKE 1 TABLET BY MOUTH TWICE  DAILY 8/20/24  Yes Jong Moraes MD   levothyroxine (SYNTHROID) 200 MCG tablet TAKE 1 TABLET BY MOUTH DAILY 7/8/24  Yes Jong Moraes MD   traZODone (DESYREL) 100 MG tablet TAKE 2 TABLETS BY MOUTH AT NIGHT 7/8/24  Yes Jong Moraes MD   umeclidinium-vilanterol (ANORO ELLIPTA) 62.5-25 MCG/ACT inhaler Inhale 1 puff into the lungs daily 7/1/24  Yes Boy Dennis MD   VENTOLIN  (90 Base) MCG/ACT inhaler USE 2 INHALATIONS BY MOUTH INTO  THE LUNGS 4 TIMES DAILY AS  NEEDED FOR WHEEZING 5/6/24  Yes Jong Moraes MD   citalopram (CELEXA) 20 MG

## 2024-09-30 NOTE — CARE COORDINATION
09/30/24 1405   Readmission Assessment   Number of Days since last admission? 8-30 days   Previous Disposition Home with Family   Who is being Interviewed Patient   What was the patient's/caregiver's perception as to why they think they needed to return back to the hospital? Other (Comment)  (Shortness of breath.)   Did you visit your Primary Care Physician after you left the hospital, before you returned this time? Yes   Did you see a specialist, such as Cardiac, Pulmonary, Orthopedic Physician, etc. after you left the hospital? No   Who advised the patient to return to the hospital? Self-referral   Does the patient report anything that got in the way of taking their medications? No   In our efforts to provide the best possible care to you and others like you, can you think of anything that we could have done to help you after you left the hospital the first time, so that you might not have needed to return so soon? Other (Comment)  (\"I just couldn't breath.\")

## 2024-09-30 NOTE — CARE COORDINATION
Case Management Assessment  Initial Evaluation    Date/Time of Evaluation: 9/30/2024 2:01 PM  Assessment Completed by: Mis Lee RN    If patient is discharged prior to next notation, then this note serves as note for discharge by case management.    Patient Name: Toshia South                   YOB: 1945  Diagnosis: Acute respiratory failure with hypoxia [J96.01]  Pneumonia of right lower lobe due to infectious organism [J18.9]  Acute on chronic congestive heart failure, unspecified heart failure type (HCC) [I50.9]                   Date / Time: 9/29/2024  5:41 AM    Patient Admission Status: Inpatient   Readmission Risk (Low < 19, Mod (19-27), High > 27): Readmission Risk Score: 26.3    Current PCP: Jong Moraes MD  PCP verified by CM? (P) Yes    Chart Reviewed: Yes      History Provided by: (P) Patient  Patient Orientation: (P) Alert and Oriented, Person, Place, Situation    Patient Cognition: (P) Alert    Hospitalization in the last 30 days (Readmission):  Yes    If yes, Readmission Assessment in  Navigator will be completed.    Advance Directives:      Code Status: Full Code   Patient's Primary Decision Maker is: (P) Legal Next of Kin    Primary Decision Maker (Active): Jerrell South - Spouse - [phone]    Secondary Decision Maker: BrannonLucio - Child - [phone]    Supplemental (Other) Decision Maker: Jose Eduardo South - Micha - [phone]    Discharge Planning:    Patient lives with: (P) Spouse/Significant Other Type of Home: (P) House  Primary Care Giver: (P) Self  Patient Support Systems include: (P) Spouse/Significant Other, Children, Family Members, Friends/Neighbors   Current Financial resources: (P) Medicare  Current community resources: (P) None  Current services prior to admission: (P) Durable Medical Equipment, Oxygen Therapy            Current DME: (P) Oxygen Therapy (Comment), Shower Chair, Cane, Walker            Type of Home Care services:  (P)

## 2024-10-01 LAB
ALBUMIN SERPL-MCNC: 3 G/DL (ref 3.5–5.2)
ALBUMIN/GLOB SERPL: 0.9 {RATIO} (ref 1–2.5)
ALP SERPL-CCNC: 71 U/L (ref 35–104)
ALT SERPL-CCNC: 7 U/L (ref 10–35)
ANION GAP SERPL CALCULATED.3IONS-SCNC: 5 MMOL/L (ref 9–16)
AST SERPL-CCNC: 13 U/L (ref 10–35)
BASOPHILS # BLD: <0.03 K/UL (ref 0–0.2)
BASOPHILS NFR BLD: 0 % (ref 0–2)
BILIRUB SERPL-MCNC: 0.3 MG/DL (ref 0–1.2)
BNP SERPL-MCNC: 2842 PG/ML (ref 0–450)
BUN SERPL-MCNC: 10 MG/DL (ref 8–23)
BUN/CREAT SERPL: 20 (ref 9–20)
CALCIUM SERPL-MCNC: 9.1 MG/DL (ref 8.6–10.4)
CHLORIDE SERPL-SCNC: 100 MMOL/L (ref 98–107)
CO2 SERPL-SCNC: 34 MMOL/L (ref 20–31)
CREAT SERPL-MCNC: 0.5 MG/DL (ref 0.5–0.9)
CRP SERPL HS-MCNC: 53 MG/L (ref 0–5)
EOSINOPHIL # BLD: 0.05 K/UL (ref 0–0.44)
EOSINOPHILS RELATIVE PERCENT: 1 % (ref 1–4)
ERYTHROCYTE [DISTWIDTH] IN BLOOD BY AUTOMATED COUNT: 14.1 % (ref 11.8–14.4)
GFR, ESTIMATED: >90 ML/MIN/1.73M2
GLUCOSE SERPL-MCNC: 88 MG/DL (ref 74–99)
HCT VFR BLD AUTO: 31.9 % (ref 36.3–47.1)
HGB BLD-MCNC: 10.4 G/DL (ref 11.9–15.1)
IMM GRANULOCYTES # BLD AUTO: <0.03 K/UL (ref 0–0.3)
IMM GRANULOCYTES NFR BLD: 0 %
LYMPHOCYTES NFR BLD: 1.31 K/UL (ref 1.1–3.7)
LYMPHOCYTES RELATIVE PERCENT: 17 % (ref 24–43)
MCH RBC QN AUTO: 31.7 PG (ref 25.2–33.5)
MCHC RBC AUTO-ENTMCNC: 32.6 G/DL (ref 28.4–34.8)
MCV RBC AUTO: 97.3 FL (ref 82.6–102.9)
MONOCYTES NFR BLD: 0.88 K/UL (ref 0.1–1.2)
MONOCYTES NFR BLD: 11 % (ref 3–12)
NEUTROPHILS NFR BLD: 71 % (ref 36–65)
NEUTS SEG NFR BLD: 5.64 K/UL (ref 1.5–8.1)
NRBC BLD-RTO: 0 PER 100 WBC
PLATELET # BLD AUTO: 341 K/UL (ref 138–453)
PMV BLD AUTO: 9.4 FL (ref 8.1–13.5)
POTASSIUM SERPL-SCNC: 4 MMOL/L (ref 3.7–5.3)
PROT SERPL-MCNC: 6.2 G/DL (ref 6.6–8.7)
RBC # BLD AUTO: 3.28 M/UL (ref 3.95–5.11)
SODIUM SERPL-SCNC: 139 MMOL/L (ref 136–145)
VANCOMYCIN SERPL-MCNC: 11 UG/ML (ref 5–40)
WBC OTHER # BLD: 7.9 K/UL (ref 3.5–11.3)

## 2024-10-01 PROCEDURE — 94664 DEMO&/EVAL PT USE INHALER: CPT

## 2024-10-01 PROCEDURE — 6370000000 HC RX 637 (ALT 250 FOR IP): Performed by: NURSE PRACTITIONER

## 2024-10-01 PROCEDURE — 97535 SELF CARE MNGMENT TRAINING: CPT

## 2024-10-01 PROCEDURE — 97110 THERAPEUTIC EXERCISES: CPT

## 2024-10-01 PROCEDURE — 6360000002 HC RX W HCPCS: Performed by: INTERNAL MEDICINE

## 2024-10-01 PROCEDURE — 97116 GAIT TRAINING THERAPY: CPT

## 2024-10-01 PROCEDURE — 2700000000 HC OXYGEN THERAPY PER DAY

## 2024-10-01 PROCEDURE — 94660 CPAP INITIATION&MGMT: CPT

## 2024-10-01 PROCEDURE — 6370000000 HC RX 637 (ALT 250 FOR IP): Performed by: INTERNAL MEDICINE

## 2024-10-01 PROCEDURE — 85025 COMPLETE CBC W/AUTO DIFF WBC: CPT

## 2024-10-01 PROCEDURE — 83880 ASSAY OF NATRIURETIC PEPTIDE: CPT

## 2024-10-01 PROCEDURE — 92526 ORAL FUNCTION THERAPY: CPT

## 2024-10-01 PROCEDURE — 94669 MECHANICAL CHEST WALL OSCILL: CPT

## 2024-10-01 PROCEDURE — 2580000003 HC RX 258: Performed by: INTERNAL MEDICINE

## 2024-10-01 PROCEDURE — 6370000000 HC RX 637 (ALT 250 FOR IP)

## 2024-10-01 PROCEDURE — 2580000003 HC RX 258: Performed by: NURSE PRACTITIONER

## 2024-10-01 PROCEDURE — 1200000000 HC SEMI PRIVATE

## 2024-10-01 PROCEDURE — 80202 ASSAY OF VANCOMYCIN: CPT

## 2024-10-01 PROCEDURE — 94640 AIRWAY INHALATION TREATMENT: CPT

## 2024-10-01 PROCEDURE — 80053 COMPREHEN METABOLIC PANEL: CPT

## 2024-10-01 PROCEDURE — 86140 C-REACTIVE PROTEIN: CPT

## 2024-10-01 PROCEDURE — 6360000002 HC RX W HCPCS: Performed by: NURSE PRACTITIONER

## 2024-10-01 PROCEDURE — 36415 COLL VENOUS BLD VENIPUNCTURE: CPT

## 2024-10-01 PROCEDURE — 94761 N-INVAS EAR/PLS OXIMETRY MLT: CPT

## 2024-10-01 RX ORDER — TRAZODONE HYDROCHLORIDE 50 MG/1
TABLET, FILM COATED ORAL
Status: DISPENSED
Start: 2024-10-01 | End: 2024-10-02

## 2024-10-01 RX ORDER — HYDROMORPHONE HYDROCHLORIDE 1 MG/ML
0.25 INJECTION, SOLUTION INTRAMUSCULAR; INTRAVENOUS; SUBCUTANEOUS
Status: DISCONTINUED | OUTPATIENT
Start: 2024-10-01 | End: 2024-10-02 | Stop reason: HOSPADM

## 2024-10-01 RX ORDER — TRAZODONE HYDROCHLORIDE 50 MG/1
200 TABLET, FILM COATED ORAL NIGHTLY
Status: DISCONTINUED | OUTPATIENT
Start: 2024-10-02 | End: 2024-10-01

## 2024-10-01 RX ORDER — TRAZODONE HYDROCHLORIDE 50 MG/1
200 TABLET, FILM COATED ORAL NIGHTLY
Status: DISCONTINUED | OUTPATIENT
Start: 2024-10-01 | End: 2024-10-02 | Stop reason: HOSPADM

## 2024-10-01 RX ORDER — HYDROCODONE BITARTRATE AND ACETAMINOPHEN 10; 325 MG/1; MG/1
1 TABLET ORAL 3 TIMES DAILY PRN
Status: DISCONTINUED | OUTPATIENT
Start: 2024-10-01 | End: 2024-10-02 | Stop reason: HOSPADM

## 2024-10-01 RX ORDER — BUMETANIDE 1 MG/1
2 TABLET ORAL DAILY
Status: DISCONTINUED | OUTPATIENT
Start: 2024-10-02 | End: 2024-10-02 | Stop reason: HOSPADM

## 2024-10-01 RX ADMIN — SERTRALINE HYDROCHLORIDE 50 MG: 50 TABLET ORAL at 08:45

## 2024-10-01 RX ADMIN — IPRATROPIUM BROMIDE AND ALBUTEROL SULFATE 1 DOSE: 2.5; .5 SOLUTION RESPIRATORY (INHALATION) at 15:58

## 2024-10-01 RX ADMIN — CALCIUM CARBONATE-VITAMIN D TAB 500 MG-200 UNIT 1 TABLET: 500-200 TAB at 08:45

## 2024-10-01 RX ADMIN — IPRATROPIUM BROMIDE AND ALBUTEROL SULFATE 1 DOSE: 2.5; .5 SOLUTION RESPIRATORY (INHALATION) at 10:40

## 2024-10-01 RX ADMIN — BUMETANIDE 2 MG: 0.25 INJECTION INTRAMUSCULAR; INTRAVENOUS at 08:45

## 2024-10-01 RX ADMIN — GUAIFENESIN 600 MG: 600 TABLET, EXTENDED RELEASE ORAL at 08:44

## 2024-10-01 RX ADMIN — POTASSIUM CHLORIDE 20 MEQ: 1500 TABLET, EXTENDED RELEASE ORAL at 21:59

## 2024-10-01 RX ADMIN — HYDROCODONE BITARTRATE AND ACETAMINOPHEN 1 TABLET: 10; 325 TABLET ORAL at 11:56

## 2024-10-01 RX ADMIN — LEVOTHYROXINE SODIUM 200 MCG: 100 TABLET ORAL at 06:46

## 2024-10-01 RX ADMIN — CEFEPIME 2000 MG: 2 INJECTION, POWDER, FOR SOLUTION INTRAVENOUS at 10:04

## 2024-10-01 RX ADMIN — VANCOMYCIN HYDROCHLORIDE 1000 MG: 1 INJECTION, POWDER, LYOPHILIZED, FOR SOLUTION INTRAVENOUS at 08:47

## 2024-10-01 RX ADMIN — TRAZODONE HYDROCHLORIDE 200 MG: 50 TABLET ORAL at 22:12

## 2024-10-01 RX ADMIN — POTASSIUM CHLORIDE 20 MEQ: 1500 TABLET, EXTENDED RELEASE ORAL at 08:46

## 2024-10-01 RX ADMIN — POTASSIUM CHLORIDE 40 MEQ: 1500 TABLET, EXTENDED RELEASE ORAL at 21:50

## 2024-10-01 RX ADMIN — CEFEPIME 2000 MG: 2 INJECTION, POWDER, FOR SOLUTION INTRAVENOUS at 18:46

## 2024-10-01 RX ADMIN — SODIUM CHLORIDE: 9 INJECTION, SOLUTION INTRAVENOUS at 23:18

## 2024-10-01 RX ADMIN — IPRATROPIUM BROMIDE AND ALBUTEROL SULFATE 1 DOSE: 2.5; .5 SOLUTION RESPIRATORY (INHALATION) at 05:44

## 2024-10-01 RX ADMIN — CEFEPIME 2000 MG: 2 INJECTION, POWDER, FOR SOLUTION INTRAVENOUS at 02:09

## 2024-10-01 RX ADMIN — HYDROMORPHONE HYDROCHLORIDE 0.25 MG: 1 INJECTION, SOLUTION INTRAMUSCULAR; INTRAVENOUS; SUBCUTANEOUS at 21:51

## 2024-10-01 RX ADMIN — DILTIAZEM HYDROCHLORIDE 120 MG: 120 CAPSULE, COATED, EXTENDED RELEASE ORAL at 08:44

## 2024-10-01 RX ADMIN — HYDROCODONE BITARTRATE AND ACETAMINOPHEN 1 TABLET: 10; 325 TABLET ORAL at 18:02

## 2024-10-01 RX ADMIN — GUAIFENESIN 600 MG: 600 TABLET, EXTENDED RELEASE ORAL at 21:59

## 2024-10-01 RX ADMIN — RIVAROXABAN 20 MG: 20 TABLET, FILM COATED ORAL at 08:45

## 2024-10-01 RX ADMIN — CITALOPRAM HYDROBROMIDE 40 MG: 20 TABLET ORAL at 08:45

## 2024-10-01 RX ADMIN — Medication 1 TABLET: at 08:44

## 2024-10-01 RX ADMIN — PREGABALIN 300 MG: 75 CAPSULE ORAL at 21:49

## 2024-10-01 RX ADMIN — HYDROMORPHONE HYDROCHLORIDE 0.25 MG: 1 INJECTION, SOLUTION INTRAMUSCULAR; INTRAVENOUS; SUBCUTANEOUS at 12:56

## 2024-10-01 RX ADMIN — IPRATROPIUM BROMIDE AND ALBUTEROL SULFATE 1 DOSE: 2.5; .5 SOLUTION RESPIRATORY (INHALATION) at 20:42

## 2024-10-01 RX ADMIN — DULOXETINE HYDROCHLORIDE 60 MG: 60 CAPSULE, DELAYED RELEASE ORAL at 08:45

## 2024-10-01 RX ADMIN — PREGABALIN 300 MG: 75 CAPSULE ORAL at 08:45

## 2024-10-01 RX ADMIN — SODIUM CHLORIDE, PRESERVATIVE FREE 10 ML: 5 INJECTION INTRAVENOUS at 22:00

## 2024-10-01 RX ADMIN — HYDROMORPHONE HYDROCHLORIDE 0.25 MG: 1 INJECTION, SOLUTION INTRAMUSCULAR; INTRAVENOUS; SUBCUTANEOUS at 16:00

## 2024-10-01 RX ADMIN — ASPIRIN 81 MG: 81 TABLET, CHEWABLE ORAL at 08:45

## 2024-10-01 RX ADMIN — VANCOMYCIN HYDROCHLORIDE 1250 MG: 500 INJECTION, POWDER, LYOPHILIZED, FOR SOLUTION INTRAVENOUS at 23:18

## 2024-10-01 RX ADMIN — DIGOXIN 125 MCG: 125 TABLET ORAL at 08:44

## 2024-10-01 ASSESSMENT — PAIN DESCRIPTION - LOCATION
LOCATION: BACK
LOCATION: BACK;LEG
LOCATION: BACK

## 2024-10-01 ASSESSMENT — PAIN DESCRIPTION - DESCRIPTORS
DESCRIPTORS: SHARP
DESCRIPTORS: SHARP
DESCRIPTORS: ACHING
DESCRIPTORS: SHARP

## 2024-10-01 ASSESSMENT — PAIN DESCRIPTION - PAIN TYPE
TYPE: CHRONIC PAIN

## 2024-10-01 ASSESSMENT — PAIN - FUNCTIONAL ASSESSMENT
PAIN_FUNCTIONAL_ASSESSMENT: ACTIVITIES ARE NOT PREVENTED

## 2024-10-01 ASSESSMENT — PAIN DESCRIPTION - ORIENTATION
ORIENTATION: LOWER
ORIENTATION: LOWER
ORIENTATION: MID;RIGHT;LEFT
ORIENTATION: LOWER
ORIENTATION: MID;LOWER
ORIENTATION: MID
ORIENTATION: RIGHT;LEFT;MID
ORIENTATION: RIGHT;LEFT
ORIENTATION: LOWER

## 2024-10-01 ASSESSMENT — PAIN DESCRIPTION - ONSET
ONSET: ON-GOING
ONSET: PROGRESSIVE
ONSET: ON-GOING

## 2024-10-01 ASSESSMENT — PAIN DESCRIPTION - FREQUENCY
FREQUENCY: CONTINUOUS

## 2024-10-01 ASSESSMENT — PAIN SCALES - GENERAL
PAINLEVEL_OUTOF10: 10
PAINLEVEL_OUTOF10: 7
PAINLEVEL_OUTOF10: 10
PAINLEVEL_OUTOF10: 9
PAINLEVEL_OUTOF10: 10
PAINLEVEL_OUTOF10: 7
PAINLEVEL_OUTOF10: 9
PAINLEVEL_OUTOF10: 10

## 2024-10-02 VITALS
OXYGEN SATURATION: 95 % | SYSTOLIC BLOOD PRESSURE: 96 MMHG | RESPIRATION RATE: 20 BRPM | TEMPERATURE: 98.4 F | DIASTOLIC BLOOD PRESSURE: 57 MMHG | HEART RATE: 83 BPM | WEIGHT: 143.6 LBS | HEIGHT: 65 IN | BODY MASS INDEX: 23.93 KG/M2

## 2024-10-02 LAB
ALBUMIN SERPL-MCNC: 3.1 G/DL (ref 3.5–5.2)
ALBUMIN/GLOB SERPL: 0.9 {RATIO} (ref 1–2.5)
ALP SERPL-CCNC: 71 U/L (ref 35–104)
ALT SERPL-CCNC: 9 U/L (ref 10–35)
ANION GAP SERPL CALCULATED.3IONS-SCNC: 8 MMOL/L (ref 9–16)
AST SERPL-CCNC: 13 U/L (ref 10–35)
BASOPHILS # BLD: 0.04 K/UL (ref 0–0.2)
BASOPHILS NFR BLD: 1 % (ref 0–2)
BILIRUB SERPL-MCNC: 0.3 MG/DL (ref 0–1.2)
BNP SERPL-MCNC: 1559 PG/ML (ref 0–450)
BUN SERPL-MCNC: 14 MG/DL (ref 8–23)
BUN/CREAT SERPL: 35 (ref 9–20)
CALCIUM SERPL-MCNC: 9.1 MG/DL (ref 8.6–10.4)
CHLORIDE SERPL-SCNC: 102 MMOL/L (ref 98–107)
CO2 SERPL-SCNC: 31 MMOL/L (ref 20–31)
CREAT SERPL-MCNC: 0.4 MG/DL (ref 0.5–0.9)
CRP SERPL HS-MCNC: 41.3 MG/L (ref 0–5)
EOSINOPHIL # BLD: 0.11 K/UL (ref 0–0.44)
EOSINOPHILS RELATIVE PERCENT: 2 % (ref 1–4)
ERYTHROCYTE [DISTWIDTH] IN BLOOD BY AUTOMATED COUNT: 13.9 % (ref 11.8–14.4)
GFR, ESTIMATED: >90 ML/MIN/1.73M2
GLUCOSE SERPL-MCNC: 86 MG/DL (ref 74–99)
HCT VFR BLD AUTO: 32.6 % (ref 36.3–47.1)
HGB BLD-MCNC: 10.5 G/DL (ref 11.9–15.1)
IMM GRANULOCYTES # BLD AUTO: <0.03 K/UL (ref 0–0.3)
IMM GRANULOCYTES NFR BLD: 0 %
LYMPHOCYTES NFR BLD: 1.21 K/UL (ref 1.1–3.7)
LYMPHOCYTES RELATIVE PERCENT: 21 % (ref 24–43)
MCH RBC QN AUTO: 31.3 PG (ref 25.2–33.5)
MCHC RBC AUTO-ENTMCNC: 32.2 G/DL (ref 28.4–34.8)
MCV RBC AUTO: 97 FL (ref 82.6–102.9)
MONOCYTES NFR BLD: 0.68 K/UL (ref 0.1–1.2)
MONOCYTES NFR BLD: 12 % (ref 3–12)
NEUTROPHILS NFR BLD: 64 % (ref 36–65)
NEUTS SEG NFR BLD: 3.75 K/UL (ref 1.5–8.1)
NRBC BLD-RTO: 0 PER 100 WBC
PLATELET # BLD AUTO: 313 K/UL (ref 138–453)
PMV BLD AUTO: 9.8 FL (ref 8.1–13.5)
POTASSIUM SERPL-SCNC: 4.1 MMOL/L (ref 3.7–5.3)
PROT SERPL-MCNC: 6.4 G/DL (ref 6.6–8.7)
RBC # BLD AUTO: 3.36 M/UL (ref 3.95–5.11)
SODIUM SERPL-SCNC: 141 MMOL/L (ref 136–145)
WBC OTHER # BLD: 5.8 K/UL (ref 3.5–11.3)

## 2024-10-02 PROCEDURE — 83880 ASSAY OF NATRIURETIC PEPTIDE: CPT

## 2024-10-02 PROCEDURE — 97535 SELF CARE MNGMENT TRAINING: CPT

## 2024-10-02 PROCEDURE — 2580000003 HC RX 258: Performed by: INTERNAL MEDICINE

## 2024-10-02 PROCEDURE — 97116 GAIT TRAINING THERAPY: CPT

## 2024-10-02 PROCEDURE — 94761 N-INVAS EAR/PLS OXIMETRY MLT: CPT

## 2024-10-02 PROCEDURE — 94660 CPAP INITIATION&MGMT: CPT

## 2024-10-02 PROCEDURE — 94669 MECHANICAL CHEST WALL OSCILL: CPT

## 2024-10-02 PROCEDURE — 80053 COMPREHEN METABOLIC PANEL: CPT

## 2024-10-02 PROCEDURE — 2580000003 HC RX 258: Performed by: NURSE PRACTITIONER

## 2024-10-02 PROCEDURE — 6360000002 HC RX W HCPCS: Performed by: NURSE PRACTITIONER

## 2024-10-02 PROCEDURE — 6370000000 HC RX 637 (ALT 250 FOR IP): Performed by: NURSE PRACTITIONER

## 2024-10-02 PROCEDURE — 6360000002 HC RX W HCPCS: Performed by: INTERNAL MEDICINE

## 2024-10-02 PROCEDURE — 36415 COLL VENOUS BLD VENIPUNCTURE: CPT

## 2024-10-02 PROCEDURE — 85025 COMPLETE CBC W/AUTO DIFF WBC: CPT

## 2024-10-02 PROCEDURE — 97110 THERAPEUTIC EXERCISES: CPT

## 2024-10-02 PROCEDURE — 92526 ORAL FUNCTION THERAPY: CPT

## 2024-10-02 PROCEDURE — 94640 AIRWAY INHALATION TREATMENT: CPT

## 2024-10-02 PROCEDURE — 2700000000 HC OXYGEN THERAPY PER DAY

## 2024-10-02 PROCEDURE — 86140 C-REACTIVE PROTEIN: CPT

## 2024-10-02 RX ORDER — LEVOFLOXACIN 750 MG/1
750 TABLET, FILM COATED ORAL DAILY
Qty: 7 TABLET | Refills: 0 | Status: SHIPPED | OUTPATIENT
Start: 2024-10-02 | End: 2024-10-09

## 2024-10-02 RX ORDER — HYDROCODONE BITARTRATE AND ACETAMINOPHEN 10; 325 MG/1; MG/1
1 TABLET ORAL EVERY 8 HOURS PRN
Qty: 9 TABLET | Refills: 0 | Status: SHIPPED | OUTPATIENT
Start: 2024-10-02 | End: 2024-10-05

## 2024-10-02 RX ORDER — NYSTATIN 100000 [USP'U]/ML
500000 SUSPENSION ORAL 4 TIMES DAILY
DISCHARGE
Start: 2024-10-02 | End: 2024-10-05

## 2024-10-02 RX ORDER — HYDROMORPHONE HYDROCHLORIDE 2 MG/1
2 TABLET ORAL EVERY 12 HOURS PRN
Qty: 6 TABLET | Refills: 0 | Status: SHIPPED | OUTPATIENT
Start: 2024-10-02 | End: 2024-10-05

## 2024-10-02 RX ADMIN — LEVOTHYROXINE SODIUM 200 MCG: 100 TABLET ORAL at 09:01

## 2024-10-02 RX ADMIN — ASPIRIN 81 MG: 81 TABLET, CHEWABLE ORAL at 09:01

## 2024-10-02 RX ADMIN — CALCIUM CARBONATE-VITAMIN D TAB 500 MG-200 UNIT 1 TABLET: 500-200 TAB at 09:01

## 2024-10-02 RX ADMIN — SERTRALINE HYDROCHLORIDE 50 MG: 50 TABLET ORAL at 09:01

## 2024-10-02 RX ADMIN — CEFEPIME 2000 MG: 2 INJECTION, POWDER, FOR SOLUTION INTRAVENOUS at 11:15

## 2024-10-02 RX ADMIN — HYDROMORPHONE HYDROCHLORIDE 0.25 MG: 1 INJECTION, SOLUTION INTRAMUSCULAR; INTRAVENOUS; SUBCUTANEOUS at 09:00

## 2024-10-02 RX ADMIN — VANCOMYCIN HYDROCHLORIDE 1250 MG: 500 INJECTION, POWDER, LYOPHILIZED, FOR SOLUTION INTRAVENOUS at 09:18

## 2024-10-02 RX ADMIN — POTASSIUM CHLORIDE 20 MEQ: 1500 TABLET, EXTENDED RELEASE ORAL at 09:01

## 2024-10-02 RX ADMIN — CEFEPIME 2000 MG: 2 INJECTION, POWDER, FOR SOLUTION INTRAVENOUS at 03:04

## 2024-10-02 RX ADMIN — Medication 1 TABLET: at 09:01

## 2024-10-02 RX ADMIN — RIVAROXABAN 20 MG: 20 TABLET, FILM COATED ORAL at 09:01

## 2024-10-02 RX ADMIN — GUAIFENESIN 600 MG: 600 TABLET, EXTENDED RELEASE ORAL at 09:01

## 2024-10-02 RX ADMIN — DULOXETINE HYDROCHLORIDE 60 MG: 60 CAPSULE, DELAYED RELEASE ORAL at 09:01

## 2024-10-02 RX ADMIN — HYDROMORPHONE HYDROCHLORIDE 0.25 MG: 1 INJECTION, SOLUTION INTRAMUSCULAR; INTRAVENOUS; SUBCUTANEOUS at 14:01

## 2024-10-02 RX ADMIN — SODIUM CHLORIDE, PRESERVATIVE FREE 10 ML: 5 INJECTION INTRAVENOUS at 09:01

## 2024-10-02 RX ADMIN — IPRATROPIUM BROMIDE AND ALBUTEROL SULFATE 1 DOSE: 2.5; .5 SOLUTION RESPIRATORY (INHALATION) at 10:46

## 2024-10-02 RX ADMIN — DIGOXIN 125 MCG: 125 TABLET ORAL at 09:01

## 2024-10-02 RX ADMIN — CITALOPRAM HYDROBROMIDE 40 MG: 20 TABLET ORAL at 09:01

## 2024-10-02 RX ADMIN — IPRATROPIUM BROMIDE AND ALBUTEROL SULFATE 1 DOSE: 2.5; .5 SOLUTION RESPIRATORY (INHALATION) at 05:24

## 2024-10-02 RX ADMIN — PREGABALIN 300 MG: 75 CAPSULE ORAL at 09:01

## 2024-10-02 ASSESSMENT — PAIN DESCRIPTION - FREQUENCY
FREQUENCY: CONTINUOUS
FREQUENCY: CONTINUOUS

## 2024-10-02 ASSESSMENT — PAIN DESCRIPTION - ORIENTATION
ORIENTATION: MID
ORIENTATION: MID

## 2024-10-02 ASSESSMENT — PAIN SCALES - GENERAL
PAINLEVEL_OUTOF10: 7
PAINLEVEL_OUTOF10: 3
PAINLEVEL_OUTOF10: 9
PAINLEVEL_OUTOF10: 9

## 2024-10-02 ASSESSMENT — PAIN DESCRIPTION - ONSET
ONSET: ON-GOING
ONSET: ON-GOING

## 2024-10-02 ASSESSMENT — PAIN DESCRIPTION - DESCRIPTORS
DESCRIPTORS: ACHING
DESCRIPTORS: ACHING;DISCOMFORT
DESCRIPTORS: ACHING;DISCOMFORT

## 2024-10-02 ASSESSMENT — PAIN DESCRIPTION - LOCATION
LOCATION: BACK;GENERALIZED
LOCATION: BACK
LOCATION: BACK;GENERALIZED

## 2024-10-02 ASSESSMENT — PAIN DESCRIPTION - PAIN TYPE
TYPE: CHRONIC PAIN
TYPE: CHRONIC PAIN

## 2024-10-02 NOTE — CARE COORDINATION
IMM letter provided to patient.  Patient offered four hours to make informed decision regarding appeal process; patient agreeable to discharge.       10/02/24 1411   IMM Letter   IMM Letter given to Patient/Family/Significant other/Guardian/POA/by: second-patient / NASIM GERBER   IMM Letter date given: 10/02/24   IMM Letter time given: 1402     Patient is discharge to the [facility].  [facility]  DC paper work fax to the SNF.   aware of the discharge.    BUZZ Torres'

## 2024-10-02 NOTE — PROGRESS NOTES
Oral Bisphosphonates have an increased risk of serious GI events if the strict dosing instructions are not followed. Per the FDA labeling, oral bisphosphonates must be taken at least 30 min (60 min for ibandronate) before the first food, beverage or medication of the day.  Patients MUST also avoid lying down for at least 30 min (60 minutes for ibandronate) after taking the oral bisphosphonate.   Because these strict dosage instructions are difficult to follow in many hospitalized patients, orders for oral bisphosphonate, alendronate (Fosamax), therapy will be discontinued per the [facility] Formulary Committee Policy. Consider risk versus benefits when resuming the oral bisphosphonate at discharge.    Medication Held:    Fosamax (alendronate) 70 mg weekly    Thank you,  Marjorie Cook, PharmD 9/29/2024 11:55 AM    
  Physician Progress Note      PATIENT:               FRANSISCA OGDEN  [facility] #:                  [id number]  :                       1945  ADMIT DATE:       2024 5:41 AM  DISCH DATE:  RESPONDING  PROVIDER #:        KIMMY HAGER APRN - CNP          QUERY TEXT:    Pt admitted with Pneumonia. Pt noted to have WBC up to 14.2, CRP 88.1, HR up   to 111, and RR up to 28. If possible, please document in the progress notes   and discharge summary if you are evaluating and /or treating any of the   following:    The medical record reflects the following:  Risk Factors: PNA  Clinical Indicators: patient admitted with PNA, on arrival noted to have WBC   12.2-14.2, lactic 4.0, CRP 88.1, temp 97.8, , RR 28 with acute hypoxic   respiratory failure; CT chest shows extensive patchy bilateral ground-glass   opacities and patchy consolidations in the RUL and RLL compatible with   multifocal PNA  Treatment: Labs, imaging, monitoring, supplemental oxygen, Vancomycin,   Cefepime  Options provided:  -- Sepsis, present on admission  -- Sepsis, present on admission, now resolved  -- Sepsis was ruled out  -- Other - I will add my own diagnosis  -- Disagree - Not applicable / Not valid  -- Disagree - Clinically unable to determine / Unknown  -- Refer to Clinical Documentation Reviewer    PROVIDER RESPONSE TEXT:    This patient was treated for sepsis this admission, which was present on   admission and is currently resolved.    Query created by: Norma Penaloza on 10/1/2024 11:34 AM      QUERY TEXT:    Pt admitted with pneumonia. If possible, please document in the progress notes   and discharge summary if you are evaluating and/or treating any of the   following:    Note: CAP and HCAP indicate where the pneumonia was acquired, not a specific   type.    The medical record reflects the following:  Risk Factors: PNA  Clinical Indicators: patient admitted with PNA, on arrival noted to have WBC   12.2-14.2, lactic 4.0, CRP 
Assessment and vital signs as charted. Pt denies pain and is A & O x 4. BiPAP in use. Continuous pulse ox WNL. Pt denies any other needs. Call light in reach. Bed alarm on. Will continue to monitor.   
Assessment and vitals obtained. Patient alert and oriented x4. Asking to come off BiPAP so she can talk on the phone and get up to the bedside commode. Complaining of shortness of breath on exertion and at rest. Lung sounds diminished. No other needs from patient at this time. Call light within reach and bed alarm on.   
BiPAP back on at this time.   
Entered patient's room for morning vital signs and head to toe assessment. Patient resting in the bed at this time. A&O x4, calm, and cooperative. Patient complains of pain at this time in her back, PRN medication given see MAR. Vital signs and head to toe assessment completed at this time, see flowsheets for more details. Patient ambulated to the bathroom at this time and returned to the chair. Patient denies no more needs at this time. Call light within reach. Chair alarm on. Bed/chair wheels locked. Bed in lowest position.    
[facility]  INPATIENT SPEECH THERAPY  [facility] ICU  Dysphagia Treatment      Date: 10/2/2024  Patient Name: Toshia South      CSN: [id number]   : 1945  (79 y.o.)  Gender: female   Referring Physician: Jong Moraes MD   Diagnosis: Multifocal pneumonia   Current Diet: Regular and Mildly Thick Liquids  Respiratory Status: Nasal Canula: 4L  Swallowing Strategies:  Full Upright Position, Small Bite/Sip, No Straw, Medications Whole with Puree, and Alternate Solids and Liquids   Date of Last MBS/FEES: Not Applicable    Pain:  10/10 - Pain location: RN Aware    Subjective: Pt seen seated upright in chair for dysphagia tx. Pt initially doing breathing tx upon SLP entrance in room, however RN stated pt could d/c breathing tx for dysphagia tx. Pt agreeable to participate with SLP, however frequently reported pain in various locations. RN aware.     Short-Term Goals:  Short-Term Goal Timeframe: 7 Days    SHORT TERM GOAL #1:  Goal 1: Patient will utilize slow rate and take breaks between bites and sips to improve coordination of breathing and swallowing with 80% accuracy.  INTERVENTIONS:Pt utilized slow rate between bites/sips in all trials presented with breakfast tray. Pt's breathing appeared coordinated with no s/sx of asp/pen present. Prolonged mastication noted.    SHORT TERM GOAL #2:  Goal 2: Patient will trial thin liquids without overt s/sx of aspiration/penetration in 80% of opportunities.  INTERVENTIONS: Pt trialed thin liquid water with x1 cough and x8 without s/sx of asp/pen. Pt noted to be finishing breathing tx prior to SLP arrival in room, and pt reports some increased coughing following breathing tx.     Long-Term Goals:  Long-Term Goal Timeframe: 14 Days     LONG TERM GOAL #1:  Goal #1: Patient will consume safest/least restrictive diet without overt s/sx of aspiration/penetration in 90% of opportunities.      Dysphagia Outcome and Severity Scale: 4- Mild-moderate dysphagia- Intermittent 
INTENSIVE CARE UNIT   APRN - Progress Note    Patient - Toshia South  Date of Admission -  2024  5:41 AM  Date of Evaluation -  10/1/2024  Hospital Day - 2      SUBJECTIVE:     The Toshia South is a 79 y.o. female who is seen for follow up in the ICU.  Per nursing report and notes, overnight events include: no significant events.  She sitting up in chair no distress.  Breathing easier      ROS:   Constitutional: negative  for fevers, and negative for chills.  Respiratory: positive for shortness of breath, positive for cough, and negative for wheezing  Cardiovascular: negative for chest pain, and negative for palpitations  Gastrointestinal: negative for abdominal pain, negative for nausea,negative for vomiting, negative for diarrhea, and negative for constipation    All other systems were reviewed with the patient and are negative unless otherwise stated in HPI.      OBJECTIVE:     VITAL SIGNS:  Patient Vitals for the past 8 hrs:   BP Temp Temp src Pulse Resp SpO2   10/01/24 0715 (!) 116/44 -- -- 71 17 91 %   10/01/24 0637 -- 97.2 °F (36.2 °C) Temporal -- -- --   10/01/24 0600 (!) 111/56 -- -- 63 10 98 %   10/01/24 0500 (!) 96/43 -- -- 58 12 99 %   10/01/24 0400 (!) 94/44 -- -- 55 12 100 %   10/01/24 0300 101/70 97 °F (36.1 °C) Temporal 82 20 96 %   10/01/24 0200 (!) 101/45 -- -- 65 10 100 %   10/01/24 0100 (!) 114/49 -- -- 65 11 97 %   10/01/24 0000 (!) 103/50 -- -- 65 13 97 %         Temp: 97.2 °F (36.2 °C)  Temp range:    Temp  Av.5 °F (36.4 °C)  Min: 97 °F (36.1 °C)  Max: 98 °F (36.7 °C)    BP: (!) 116/44  BP Range:      Systolic (24hrs), Av , Min:94 , Max:192      Diastolic (24hrs), Av, Min:43, Max:108    Pulse: 71  Pulse Range:    Pulse  Av  Min: 55  Max: 113    Respirations: 17  Resp Range:   Resp  Av.8  Min: 10  Max: 28    SpO2: 91 % on supplemental O2  SpO2 range:   SpO2  Av.1 %  Min: 82 %  Max: 100 %    Weight  Wt Readings from Last 3 Encounters:   24 65.1 
Inquired about giving or hold Bumex due to patient's blood pressure. Instructed to give, per Dr. Rizo.  
Instructed by Dr. Rizo to discontinue order for Trazodone as well. Patient to wear BiPAP at night.   
Notified Dr. Rizo that patient is very drowsy and unable to stay awake to answer any questions. Notified that all oral medications ordered are not able to be given due to patient condition.   
Occupational Therapy  Facility/Department: [facility] ICU  Occupational Therapy Initial Assessment    Name: Toshia South  : 1945  MRN: [id number]  Date of Service: 2024    Discharge Recommendations:  Continue to assess pending progress, Subacute/Skilled Nursing Facility, Home with Home health OT          Patient Diagnosis(es): The primary encounter diagnosis was Acute respiratory failure with hypoxia. Diagnoses of Pneumonia of right lower lobe due to infectious organism and Acute on chronic congestive heart failure, unspecified heart failure type (HCC) were also pertinent to this visit.  Past Medical History:  has a past medical history of A-fib (HCC), Anxiety, Atrial fibrillation (HCC), Biventricular congestive heart failure (HCC), Bronchiectasis with acute exacerbation (HCC), CAD (coronary artery disease), Chronic pain syndrome, COPD (chronic obstructive pulmonary disease) (HCC), Depression, GERD (gastroesophageal reflux disease), Hypothyroidism, Impaired fasting glucose, Iron deficiency anemia, Osteoporosis, Pulmonary fibrosis (HCC), and Subdural hematoma.  Past Surgical History:  has a past surgical history that includes Hysterectomy (1991); Carpal tunnel release (Right, 1999); Total knee arthroplasty (Right, 2021); fracture surgery (Left, 2018); Wrist fracture surgery (Left, 2018); lumbar discectomy (1993); Lumbar disc surgery (02/15/1994); back surgery (1998); back surgery (1999); Carpal tunnel release (Left, 1999); Neck surgery (2002); Carpal tunnel release (Right, 2002); Carpal tunnel release (Left, 2003); back surgery (10/23/2003); back surgery (10/23/2003); Cervical disc surgery (10/25/2004); Cervical disc surgery (2004); Neck surgery (2005); Toe amputation (10/08/2006); Toe amputation (2008); lumbar laminectomy (2008); Toe amputation (10/03/2008); Humerus fracture surgery (Right, 10/03/2010); Knee 
Occupational Therapy  Facility/Department: [facility] MED SURG  Daily Treatment Note  NAME: Toshia South  : 1945  MRN: [id number]    Date of Service: 10/2/2024    Discharge Recommendations:  Continue to assess pending progress, Subacute/Skilled Nursing Facility, Home with Home health OT  OT Equipment Recommendations  Equipment Needed: Yes  Mobility Devices: Walker  Walker: Rolling    Patient Diagnosis(es): The primary encounter diagnosis was Acute respiratory failure with hypoxia. Diagnoses of Pneumonia of right lower lobe due to infectious organism and Acute on chronic congestive heart failure, unspecified heart failure type (HCC) were also pertinent to this visit.     Assessment   Activity Tolerance: Patient limited by endurance  Discharge Recommendations: Continue to assess pending progress;Subacute/Skilled Nursing Facility;Home with Home health OT  Equipment Needed: Yes  Mobility Devices: Walker     Plan  Occupational Therapy Plan  Times Per Day: Once a day  Days Per Week: 7 Days  Current Treatment Recommendations: Strengthening;ROM;Balance training;Functional mobility training;Endurance training;Safety education & training;Patient/Caregiver education & training;Self-Care / ADL;Home management training    Restrictions  Restrictions/Precautions  Restrictions/Precautions: General Precautions;Fall Risk;Contact Precautions  Required Braces or Orthoses?: No    Subjective  Subjective  Subjective: Pt sitting up in bedside chair upon arrival. Pt agreed to participate in therapy session.  Pain: Pt reports 9/10 pain with her back at this time.  Orientation  Overall Orientation Status: Within Functional Limits  Pain: Chroinc L leg and back 9/10  Cognition  Overall Cognitive Status: WFL       Objective  ADL  Feeding: Independent  Grooming: Contact guard assistance  UE Bathing: Contact guard assistance  LE Bathing: Contact guard assistance  UE Dressing: Contact guard assistance  LE Dressing: Contact guard 
Occupational Therapy  Facility/Department: [facility] ICU  Daily Treatment Note  NAME: Toshia South  : 1945  MRN: [id number]    Date of Service: 10/1/2024    Discharge Recommendations:  Continue to assess pending progress, Subacute/Skilled Nursing Facility, Home with Home health OT         Patient Diagnosis(es): The primary encounter diagnosis was Acute respiratory failure with hypoxia. Diagnoses of Pneumonia of right lower lobe due to infectious organism and Acute on chronic congestive heart failure, unspecified heart failure type (HCC) were also pertinent to this visit.     Assessment   Activity Tolerance: Patient limited by endurance;Patient limited by pain  Discharge Recommendations: Continue to assess pending progress;Subacute/Skilled Nursing Facility;Home with Home health OT     Plan  Occupational Therapy Plan  Times Per Day: Once a day  Days Per Week: 7 Days  Current Treatment Recommendations: Strengthening;ROM;Balance training;Functional mobility training;Endurance training;Safety education & training;Patient/Caregiver education & training;Self-Care / ADL;Home management training    Restrictions  Restrictions/Precautions  Restrictions/Precautions: General Precautions;Fall Risk    Subjective  Subjective  Subjective: Pt sitting up in bedside chair upon arrival. Pt agreed to participate in therapy session.  Pain: Pt reported 7/10 LLE pain this date.         Objective  Vitals          ADL  Toileting: Contact guard assistance  Toileting Skilled Clinical Factors: CGA to complete varun care and clothing mgmt.  Additional Comments: CGA to complete stand pivot transfers from chair <>BSC.  OT Exercises  Exercise Treatment: Pt completed BUE ther ex x 7 planes x20 reps x1 set  to increase UE strength and endurance in order to ease completion of ADL tasks. Pt required RBs as needed secondary to fatigue.     Safety Devices  Type of Devices: All fall risk precautions in place;Chair alarm in place;Left in chair;Nurse 
Patient able to finish taking her medications at this time. Patient wants to try to keep BiPAP off so she can talk to her .   
Patient admitted to room 304. Patient drowsy but able to follow commands. Assessment and vitals obtained. Lung sounds with rhonchi. Placed back on BiPAP as soon as patient arrived to room. Lactate drawn as ordered and Vancomycin given. Call light within reach and bed alarm on.   
Patient awake and on 4L NC which is chronic for her at this time. Patient denies pain. Stated she has been coughing yellow phlegm up. Plan of care discussed with patient. AM medication given. Water freshened for mouth swab. Denies any other needs at this time  
Patient discharged at this time. Patient left the floor via wheelchair to the [facility] van to transport patient. Discharge paperwork sent with patient.  
Patient more alert at this time. Able to answer all orientation questions correctly except for the year. Patient able to get up to bedside commode with one assist to urinate. Has tremors in her hands.  states she does have tremors sometimes at home, but when she gets sick, the tremors worsen and are more prominent. Has an implanted pain pump on left side. Removed BiPAP for patient to take medications. On 4L, which is her home dose, and her oxygen saturations are 91%. Stating she feels hungry. Ordered lunch for patient. Updated Dr. Rizo.   
Patient moved from chair to bed. Became very short of breath. BiPAP placed back on patient and patient recovered quickly.   
Patient on the phone with [facility] about refilling her pain pump. They are telling her they are unable to do so since they dont have privileges at this facility. Writer checked with our pain management clinic and she stated they are unable to refill since she doesn't follow with our pain management physician.   
Patient placed back on BiPAP.     Removed soon after and placed back on 4L.  
Patient requesting to have BiPAP removed at this time.   
Patient transferred out to [facility]. Report given to Anita WONG  
Patient unable to take all of her medications at once due to feeling short of breath. Placed back on BiPAP and will attempt when patient feeling better.   
Patient using acapella at this time  
Patient with increased pain 10/10 to bilateral legs and lower back. Patient sat up to dangle at side of bed. No relief to pain by sitting up at side of bed. Patient agreed to take scheduled Lyrica at this time and move to the chair. Care ongoing.   
Per Dr. Rizo, fluids not to be given and repeat lactates not to be ordered. No new orders at this time.   
Perfect Serve sent to Dr. Rizo: \"Repeat lactate was 4.0 up from 3.5. Patient did not receive fluid in the ER but does have CHF. Lung sounds with rhonchi. Do you want her on any fluids at all or a bolus? Do you want me to place an order to continue doing lactates Q2H until less than 2? Order was only for 2 occurrences.\" Also notified that BP was 95/46 MAP 63.  
Physical Therapy  Facility/Department: [facility] ICU  Daily Treatment Note  NAME: Toshia South  : 1945  MRN: [id number]    Date of Service: 10/1/2024    Discharge Recommendations:  Continue to assess pending progress, Subacute/Skilled Nursing Facility, Home with assist PRN, Home with Home health PT     Patient Diagnosis(es): The primary encounter diagnosis was Acute respiratory failure with hypoxia. Diagnoses of Pneumonia of right lower lobe due to infectious organism and Acute on chronic congestive heart failure, unspecified heart failure type (HCC) were also pertinent to this visit.    Assessment  Assessment: Pt. performed seated and reclined B LE exercises x15 with frequent RB d/t SOB. Pt. had decrease in reps on L LE d/t increase of pain with actvity. STS x3 with RB post exercise d/t SOB-CGA. Pt. ambulated 8ftx1 with FWW and seated RB post ambulation d/t SOB-CGA. During transfers and ambulation pt. was unsteady and had an increase in SOB.  Activity Tolerance: Patient limited by endurance;Patient limited by pain    Plan  Physical Therapy Plan  General Plan: 2 times a day 7 days a week  Current Treatment Recommendations: Strengthening;Balance training;ROM;Functional mobility training;Transfer training;Endurance training;IADL training;ADL/Self-care training;Gait training;Neuromuscular re-education;Manual;Return to work related activity;Home exercise program;Safety education & training;Patient/Caregiver education & training;Positioning;Therapeutic activities    Restrictions  Restrictions/Precautions  Restrictions/Precautions: General Precautions, Fall Risk  Required Braces or Orthoses?: No     Subjective   Subjective  Subjective: Pt. in chair upon arrival, agreeable to therapy  Pain: Chroinc L leg and back  Cognition  Overall Cognitive Status: WFL    Objective  Bed Mobility Training  Bed Mobility Training: No  Transfer Training  Transfer Training: Yes  Overall Level of Assistance: Contact-guard assistance;Assist 
Physical Therapy  Facility/Department: [facility] ICU  Daily Treatment Note  NAME: Toshia South  : 1945  MRN: [id number]    Date of Service: 10/1/2024    Discharge Recommendations:  Continue to assess pending progress, Subacute/Skilled Nursing Facility, Home with assist PRN, Home with Home health PT     Patient Diagnosis(es): The primary encounter diagnosis was Acute respiratory failure with hypoxia. Diagnoses of Pneumonia of right lower lobe due to infectious organism and Acute on chronic congestive heart failure, unspecified heart failure type (HCC) were also pertinent to this visit.    Assessment  Assessment: Pt. performed seated and reclined B LE exercises x15. Pt. ambulated 6ftx1 with FWW and LOB sit-to-stand but able to regain balance with assistance-CGA. Noted pt. unsteady while standing. Commode use and transfer. Pt. declined further ambulatioin d/t pain. Transfers: CGA.  Activity Tolerance: Patient limited by endurance;Patient limited by pain    Plan  Physical Therapy Plan  General Plan: 2 times a day 7 days a week  Current Treatment Recommendations: Strengthening;Balance training;ROM;Functional mobility training;Transfer training;Endurance training;IADL training;ADL/Self-care training;Gait training;Neuromuscular re-education;Manual;Return to work related activity;Home exercise program;Safety education & training;Patient/Caregiver education & training;Positioning;Therapeutic activities    Restrictions  Restrictions/Precautions  Restrictions/Precautions: General Precautions, Fall Risk  Required Braces or Orthoses?: No     Subjective   Subjective  Subjective: Pt. in chair upon arrival, agreeable to therapy  Pain: Chroinc L leg and back  Cognition  Overall Cognitive Status: WFL    Objective  Bed Mobility Training  Bed Mobility Training: No  Transfer Training  Transfer Training: Yes  Overall Level of Assistance: Contact-guard assistance;Assist X1  Interventions: Verbal cues  Sit to Stand: Contact-guard 
Placed on BiPAP at this time as patient short of breath after getting from bedside commode to bed. Patient recovered quickly with BiPAP on.   
Placed patient back on BiPAP at this time. Patient short of breath and oxygen saturations dropping after getting up to bedside commode.   
Pt requesting to have BiPAP off at this time. Nasal canula on at 4 L. Will continue to monitor.   
Pt resting in bed watching TV. Assessment and vital signs as charted. Pt denies pain at this time. Cardiac monitor shows NSR. Continuous pulse ox WNL. Nasal canula on at 4 L. Pt denies any other needs. Call light in reach. Bed alarm on. Will continue to monitor.   
Pt resting in bed watching TV. Assessment and vital signs as charted. Pt denies pain. Pt continues to be A & O x 4. Cardiac monitor continues to show NSR. Continuous pulse ox remains WNL. BiPAP remains in use. Pt denies any other needs. Call light in reach. Bed alarm on. Will continue to monitor.   
Reassessment and vitals obtained. No change from previous assessment.  at bedside cleaning patient's dentures. Call light within reach.  
Reassessment and vitals obtained. Patient sweaty and states she always is warm. Provided fan for patient. Lung sounds diminished. No needs at this time. Call light within reach and bed alarm on.   
Reassessment and vitals obtained. Patient up in chair. Was assisted to chair by therapy. No needs at this time. Call light within reach and  at bedside.   
Received call from Dr. Rizo. Instructed to discontinue Zanaflex, Zoloft, and Celexa. Instructed to drop patient's Lyrica dose to 100mg BID. Instructed to call Dr. Rizo back in a few hours to provide update on patient.   
Report called to the [facility] [place] at this time. Report given to receiving nurse. Summary of hospitalization and assessment of patient given at this time. New medications reviewed with nurse at this time. Lab work reviewed. Mentation, medication administration preference, last bowel movement, diet, and activity status reviewed at this time. ETA given of 1500. Call back number given. All other questions and concerns answered at this time.     
Spiritual Health History and Assessment/Progress Note  [facility]    (P) Initial Encounter,  ,  ,      Name: Toshia South MRN: [id number]    Age: 79 y.o.     Sex: female   Language: English   Advent: Christianity   Multifocal pneumonia     Date: 10/2/2024            Total Time Calculated: (P) 15 min              [facility]        Referral/Consult From: (P) Rounding   Encounter Overview/Reason: (P) Initial Encounter  Service Provided For: (P) Patient and family together    Courtney, Belief, Meaning:   Patient is connected with a courtney tradition or spiritual practice  Family/Friends are connected with a courtney tradition or spiritual practice      Importance and Influence:  Patient has no beliefs influential to healthcare decision-making identified during this visit  Family/Friends have no beliefs influential to healthcare decision-making identified during this visit    Community:  Patient feels well-supported. Support system includes: Spouse/Partner, Children, [facility], and Extended family  Family/Friends feel well-supported. Support system includes: Spouse/Partner, Children, [facility], and Extended family    Assessment and Plan of Care:     Patient Interventions include: Facilitated expression of thoughts and feelings, Explored coping/struggle/distress, and Affirmed coping skills/support systems  Family/Friends Interventions include: Facilitated expression of thoughts and feelings, Explored coping/struggle/distress, and Affirmed coping skills/support systems    Patient Plan of Care: Spiritual Care available upon further referral  Family/Friends Plan of Care: Spiritual Care available upon further referral    Electronically signed by Chaplain Radha on 10/2/2024 at 2:41 PM    
Vancomycin Dosing by Pharmacy - Daily Note   Vancomycin Therapy Day:  3  Indication: multifocal PNA     Allergies:  Seasonal   Actual Weight:    Wt Readings from Last 1 Encounters:   09/30/24 65.1 kg (143 lb 9.6 oz)       Labs/Ancillary Data  Estimated Creatinine Clearance: 83 mL/min (based on SCr of 0.5 mg/dL).  Recent Labs     09/29/24  0549 09/30/24  0600 10/01/24  0520   CREATININE 0.5 0.4* 0.5   BUN 10 13 10   WBC 12.2* 14.2* 7.9     Procalcitonin   Date Value Ref Range Status   09/15/2024 0.14 (H) 0.00 - 0.09 ng/mL Final     Comment:           Suspected Sepsis:  <0.50 ng/mL     Low likelihood of sepsis.  0.50-2.00 ng/mL     Increased likelihood of sepsis. Antibiotics encouraged.  >2.00 ng/mL     High risk of sepsis/shock. Antibiotics strongly encouraged.        Suspected Lower Resp Tract Infections:  <0.24 ng/mL     Low likelihood of bacterial infection.  >0.24 ng/mL     Increased likelihood of bacterial infection. Antibiotics encouraged.        With successful antibiotic therapy, PCT levels should decrease rapidly. (Half-life of 24 to   36 hours.)        Procalcitonin values from samples collected within the first 6 hours of systemic infection   may still be low. Retesting may be indicated.  Values from day 1 and day 4 can be entered into the Change in Procalcitonin Calculator   (www.[email or web address]-pct-calculator.[employer]) to determine the patient's Mortality Risk Prognosis        In healthy neonates, plasma Procalcitonin (PCT) concentrations increase gradually after   birth, reaching peak values at about 24 hours of age then decrease to normal values below   0.5 ng/mL by 48-72 hours of age.         Intake/Output Summary (Last 24 hours) at 10/1/2024 1054  Last data filed at 10/1/2024 1002  Gross per 24 hour   Intake 1162.25 ml   Output 2800 ml   Net -1637.75 ml     Temp: 98    Culture Date / Source  /  Results  9/29/24        Blood x 2      NO growth 2 days    Recent vancomycin administrations                     
Vancomycin Dosing by Pharmacy - Daily Note   Vancomycin Therapy Day:  day 4  Indication: PNA    Allergies:  Seasonal   Actual Weight:    Wt Readings from Last 1 Encounters:   09/30/24 65.1 kg (143 lb 9.6 oz)       Labs/Ancillary Data  Estimated Creatinine Clearance: 104 mL/min (A) (based on SCr of 0.4 mg/dL (L)).  Recent Labs     09/30/24  0600 10/01/24  0520 10/02/24  0533   CREATININE 0.4* 0.5 0.4*   BUN 13 10 14   WBC 14.2* 7.9 5.8     Procalcitonin   Date Value Ref Range Status   09/15/2024 0.14 (H) 0.00 - 0.09 ng/mL Final     Comment:           Suspected Sepsis:  <0.50 ng/mL     Low likelihood of sepsis.  0.50-2.00 ng/mL     Increased likelihood of sepsis. Antibiotics encouraged.  >2.00 ng/mL     High risk of sepsis/shock. Antibiotics strongly encouraged.        Suspected Lower Resp Tract Infections:  <0.24 ng/mL     Low likelihood of bacterial infection.  >0.24 ng/mL     Increased likelihood of bacterial infection. Antibiotics encouraged.        With successful antibiotic therapy, PCT levels should decrease rapidly. (Half-life of 24 to   36 hours.)        Procalcitonin values from samples collected within the first 6 hours of systemic infection   may still be low. Retesting may be indicated.  Values from day 1 and day 4 can be entered into the Change in Procalcitonin Calculator   (www.[employer]-pct-calculator.[employer]) to determine the patient's Mortality Risk Prognosis        In healthy neonates, plasma Procalcitonin (PCT) concentrations increase gradually after   birth, reaching peak values at about 24 hours of age then decrease to normal values below   0.5 ng/mL by 48-72 hours of age.         Intake/Output Summary (Last 24 hours) at 10/2/2024 1032  Last data filed at 10/1/2024 1415  Gross per 24 hour   Intake 160 ml   Output 400 ml   Net -240 ml       Culture Date / Source  /  Results  9/29/24            blood x2    No growth 3 days      Recent vancomycin administrations                     vancomycin (VANCOCIN) 
Vitals and assessment completed at this time, see flowsheet for more details. Pt resting in chair awake at this time. Pt denies any SOB at this time. Pt c/o pain 10/10-chronic back pain. Pt states she was supposed to get her dilaudid pain pump filled 3-4 days ago and she has been in severe pain since. See MAR. Pt remains on 4L NC. Pt remains A&Ox4. All needs met at this time, call light within reach. Care ongoing.  
[facility]  INPATIENT SPEECH THERAPY  [facility] ICU  Dysphagia Treatment      Date: 10/1/2024  Patient Name: Toshia South      CSN: [id number]   : 1945  (79 y.o.)  Gender: female   Referring Physician: Jong Moraes MD   Diagnosis: Multifocal pneumonia   Current Diet: Regular and Mildly Thick Liquids  Respiratory Status: Nasal Canula: 4L  Swallowing Strategies:  Full Upright Position, Small Bite/Sip, No Straw, Medications Whole with Puree, and Alternate Solids and Liquids   Date of Last MBS/FEES: Not Applicable    Pain:  10/10 - Pain location: RN Aware    Subjective: MARVIN Veloz approved skilled ST dysphagia treatment. Upon SLP arrival pt and  sitting at bedside, on the phone with a separate provider regarding her pump. Pt upset on the phone, once the call was resolved     Short-Term Goals:  Short-Term Goal Timeframe: 7 Days    SHORT TERM GOAL #1:  Goal 1: Patient will utilize slow rate and take breaks between bites and sips to improve coordination of breathing and swallowing with 80% accuracy.  INTERVENTIONS:Pt utilized slow rate between bites/sips in all trials presented. Breathing at beginning of session incoordinated d/t pt beinf frustrated from phone call, however after prompted to take deep breaths pt's breathing WFL.    SHORT TERM GOAL #2:  Goal 2: Patient will trial thin liquids without overt s/sx of aspiration/penetration in 80% of opportunities.  INTERVENTIONS: Pt trial thin liquid water with x1 cough (pt states it was phlegm), otherwise no s/s of airway penetration, however unable to confirm without presence of direct imaging of swallowing mechanisms.     Long-Term Goals:  Long-Term Goal Timeframe: 14 Days     LONG TERM GOAL #1:  Goal #1: Patient will consume safest/least restrictive diet without overt s/sx of aspiration/penetration in 90% of opportunities.      Dysphagia Outcome and Severity Scale: 4- Mild-moderate dysphagia- Intermittent supervision/cueing. One or two diet 
Writer notified Linnette RUSH about patients increased back pain  
Writer present, vitals and assessment as charted. Patient awake sitting up in bed, a/o. Visitors at bedside left at this time. Patient request to remain on NC at 4L instead of BiPAP so she can call and speak with  prior to him going to bed. Patient c/o chronic pain, denies need for medication. Yellow to white productive cough witnessed by writer. Patient denies further needs at this time. Call light and bedside table within reach. Bed alarm active for safety.   
Writer went with pt to CT from [id number]. Monitor and nasal canula on at 4 L for CT. Pt back in bed with BiPAP on. Call light in reach. Bed alarm on. Will continue to monitor.   
Cognitive Status: WFL    Objective  Bed Mobility Training  Bed Mobility Training: No  Transfer Training  Transfer Training: Yes  Overall Level of Assistance: Contact-guard assistance;Assist X1  Interventions: Verbal cues  Sit to Stand: Contact-guard assistance;Assist X1  Stand to Sit: Contact-guard assistance;Assist X1  Gait  Gait Training: Yes  Overall Level of Assistance: Contact-guard assistance;Assist X1  Distance (ft): 20 Feet  Assistive Device: Walker, rolling;Gait belt  Interventions: Verbal cues  PT Exercises  Exercise Treatment: Reclined and seated B LE exercises x15. STS 3x2.  Safety Devices  Type of Devices: All fall risk precautions in place;Call light within reach;Chair alarm in place;Left in chair;Nurse notified  Restraints  Restraints Initially in Place: No    Goals  Short Term Goals  Time Frame for Short Term Goals: 10 days  Short Term Goal 1: Patient will ambulate 25' with FWW, CGA, O2 without LOB  Short Term Goal 2: Patient will perform bed mobility tasks and transfers with MI  Short Term Goal 3: Patient will tolerate 20-30 minutes of therex/act to improve endurance for ADLs.  Patient Goals   Patient Goals : Feel better    Education  Patient Education  Education Given To: Patient  Education Provided: Role of Therapy;Plan of Care  Education Method: Verbal  Barriers to Learning: None  Education Outcome: Verbalized understanding    Therapy Time   Individual Concurrent Group Co-treatment   Time In 1004         Time Out 1034         Minutes 30            Treated By: HOUSTON Sparrow  Observed By: Roselia Ruiz PTA    
Current  Energy (kcal/day): 6940-0547 (25-30)  Weight Used for Protein Requirements: Current  Protein (g/day): 72-85 (1.1-1.3)  Method Used for Fluid Requirements: 1 ml/kcal  Fluid (ml/day): 1900    Nutrition Diagnosis:   Moderate malnutrition related to inadequate protein-energy intake, impaired respiratory function as evidenced by weight loss, mild loss of subcutaneous fat, mild muscle loss    Lab Results   Component Value Date     09/30/2024    K 4.3 09/30/2024    CL 99 09/30/2024    CO2 32 (H) 09/30/2024    BUN 13 09/30/2024    CREATININE 0.4 (L) 09/30/2024    GLUCOSE 105 (H) 09/30/2024    CALCIUM 9.1 09/30/2024    BILITOT 0.2 09/30/2024    ALKPHOS 73 09/30/2024    AST 13 09/30/2024    ALT 8 (L) 09/30/2024    LABGLOM >90 09/30/2024    GFRAA >60 09/09/2022    GLOB NOT REPORTED 06/10/2019     Hemoglobin A1C   Date Value Ref Range Status   04/24/2024 5.5 4.0 - 6.0 % Final     No results found for: \"VITD25\"    Nutrition Interventions:   Food and/or Nutrient Delivery: Continue Current Diet  Nutrition Education/Counseling: Education initiated  Coordination of Nutrition Care: Continue to monitor while inpatient  Plan of Care discussed with: patient    Goals:     Goals: Meet at least 75% of estimated needs       Nutrition Monitoring and Evaluation:   Behavioral-Environmental Outcomes: Readiness for Change  Food/Nutrient Intake Outcomes: Food and Nutrient Intake  Physical Signs/Symptoms Outcomes: Chewing or Swallowing, Biochemical Data, Weight    Discharge Planning:    No discharge needs at this time     Jose Eduardo Conn RD, LD  Contact: [phone]    
Training  Transfer Training: No  Overall Level of Assistance: Contact-guard assistance;Assist X1  Interventions: Verbal cues  Sit to Stand: Contact-guard assistance;Assist X1  Stand to Sit: Contact-guard assistance;Assist X1  Gait  Gait Training: No  Overall Level of Assistance: Contact-guard assistance;Assist X1  Distance (ft): 20 Feet  Assistive Device: Walker, rolling;Gait belt  Interventions: Verbal cues  PT Exercises  Exercise Treatment: Reclined and seated B LE exercises x15.  Safety Devices  Type of Devices: All fall risk precautions in place;Call light within reach;Chair alarm in place;Left in chair;Nurse notified  Restraints  Restraints Initially in Place: No    Goals  Short Term Goals  Time Frame for Short Term Goals: 10 days  Short Term Goal 1: Patient will ambulate 25' with FWW, CGA, O2 without LOB  Short Term Goal 2: Patient will perform bed mobility tasks and transfers with MI  Short Term Goal 3: Patient will tolerate 20-30 minutes of therex/act to improve endurance for ADLs.  Patient Goals   Patient Goals : Feel better    Education  Patient Education  Education Given To: Patient  Education Provided: Role of Therapy;Plan of Care  Education Method: Verbal  Barriers to Learning: None  Education Outcome: Verbalized understanding    Therapy Time   Individual Concurrent Group Co-treatment   Time In 1409         Time Out 1430         Minutes 21            Treated By: HOUSTON Sparrow  Observed By: Roselia Ruiz PTA    
    Subjective  General  Chart Reviewed: Yes  Patient assessed for rehabilitation services?: Yes  Family / Caregiver Present: Yes  Referring Practitioner: Santiago Anderson MD  Referral Date : 09/30/24  Diagnosis: Acute respiratory failure with hypoxia, J96.01  Follows Commands: Within Functional Limits  Subjective  Subjective: Patient reports L LE pain which she reports is there all the time.  She reports 5/10 L LE pain.         Social/Functional History  Social/Functional History  Lives With: Spouse  Type of Home: House  Home Layout: Two level, Able to Live on Main level with bedroom/bathroom  Home Access: Stairs to enter with rails  Entrance Stairs - Number of Steps: 1+1  Entrance Stairs - Rails: Both  Bathroom Shower/Tub: Walk-in shower  Bathroom Equipment: Shower chair  Home Equipment: Walker - Rolling, Cane  Has the patient had two or more falls in the past year or any fall with injury in the past year?: Unknown  Receives Help From: Family  ADL Assistance: Independent  Homemaking Assistance: Independent  Homemaking Responsibilities: Yes  Ambulation Assistance: Independent  Transfer Assistance: Independent  Active : Yes  Additional Comments: Pt reports indep with all I/ADLs and home mgt. Has cane and walker that she uses PRN but not at all times.  Vision/Hearing  Vision  Vision: Impaired  Vision Exceptions: Wears glasses at all times  Hearing  Hearing: Within functional limits    Cognition   Orientation  Overall Orientation Status: Within Functional Limits  Cognition  Overall Cognitive Status: WFL    Objective  Temp: 98 °F (36.7 °C)  Pulse: 92  Heart Rate Source: Monitor  Respirations: 17  SpO2: (!) 89 %  O2 Device: Nasal cannula  BP: (!) 169/71  MAP (Calculated): 104  BP Location: Right upper arm  BP Method: Automatic  Patient Position: Semi fowlers     Observation/Palpation  Posture: Fair  Observation: 4L O2 NC       AROM RLE (degrees)  RLE AROM: WFL  AROM LLE (degrees)  LLE AROM : WFL  Strength 
  Intake 1608.19 ml   Output 2500 ml   Net -891.81 ml       PHYSICAL EXAM:  General Appearance  Alert , awake , not in acute distress  HEENT - Head is normocephalic, atraumatic.  Lungs - Bilateral equal air entry diminished  , no wheezes, rales or rhonchi, aeration is poor  Cardiovascular - Heart sounds are normal.  Regular rhythm, normal rate without murmur, gallop or rub.  Abdomen - Soft, nontender, nondistended, no masses or organomegaly  Neurologic - There are no new focal motor or sensory deficits  Skin - No bruising or bleeding on exposed skin area  Extremities - No cyanosis, clubbing or edema      DIAGNOSTICS:     Laboratory Testing:    See [employer] EMR for lab data    Recent Results (from the past 24 hour(s))   COVID-19, Rapid    Collection Time: 09/29/24  6:15 AM    Specimen: Nasopharyngeal Swab   Result Value Ref Range    Specimen Description .NASOPHARYNGEAL SWAB     SARS-CoV-2, Rapid Not Detected Not Detected   EKG 12 Lead    Collection Time: 09/29/24  6:19 AM   Result Value Ref Range    Ventricular Rate 112 BPM    Atrial Rate 112 BPM    P-R Interval 168 ms    QRS Duration 78 ms    Q-T Interval 328 ms    QTc Calculation (Bazett) 447 ms    P Axis 50 degrees    R Axis -24 degrees    T Axis 82 degrees   Troponin    Collection Time: 09/29/24  7:21 AM   Result Value Ref Range    Troponin, High Sensitivity <6 0 - 14 ng/L   Lactate, Sepsis    Collection Time: 09/29/24  8:35 AM   Result Value Ref Range    Lactic Acid, Sepsis 3.5 (H) 0.5 - 1.9 mmol/L   Lactate, Sepsis    Collection Time: 09/29/24 10:15 AM   Result Value Ref Range    Lactic Acid, Sepsis 4.0 (H) 0.5 - 1.9 mmol/L             Current Facility-Administered Medications   Medication Dose Route Frequency Provider Last Rate Last Admin    traZODone (DESYREL) tablet 100 mg  100 mg Oral Nightly Roselia Wise, APRN - CNP        aspirin chewable tablet 81 mg  81 mg Oral Daily Keith Rizo MD   81 mg at 09/29/24 1230    oyster shell calcium w/D 500-5 MG-MCG 
was not able to name a   surrogate decision maker or provide and advance care plan.    [] Hospice care is currently being provided or has been provided within the   calendar year.    []  I did NOT confirm today the presence of an Advance Care Plan or surrogate   decision maker documented within the patient's medical record.   [DOES NOT SATISFY [facility] PERFORMANCE]      Linnette Alvarez, RYAN - CNP , APRN-NP-C  10/2/2024  8:39 AM

## 2024-10-02 NOTE — RT PROTOCOL NOTE
RESPIRATORY ASSESSMENT PROTOCOL                                                                                              Patient Name: Toshia South Room#: I304/I304-01 : 1945     Admitting diagnosis: Acute respiratory failure with hypoxia [J96.01]  Pneumonia of right lower lobe due to infectious organism [J18.9]  Acute on chronic congestive heart failure, unspecified heart failure type (HCC) [I50.9]       Medical History:   Past Medical History:   Diagnosis Date    A-fib ([facility])     Anxiety     Atrial fibrillation (HCC)     Biventricular congestive heart failure ([facility])     Bronchiectasis with acute exacerbation ([facility]) 2022    CAD (coronary artery disease)     Chronic pain syndrome     dilaudid infusion pump    COPD (chronic obstructive pulmonary disease) (Former[facility])     Depression     GERD (gastroesophageal reflux disease)     Hypothyroidism     Impaired fasting glucose     Iron deficiency anemia     Osteoporosis     Pulmonary fibrosis ([facility]) 2022    Subdural hematoma 2022       PATIENT ASSESSMENT    LABORATORY DATA  Hematology:   Lab Results   Component Value Date/Time    WBC 7.9 10/01/2024 05:20 AM    RBC 3.28 10/01/2024 05:20 AM    HGB 10.4 10/01/2024 05:20 AM    HCT 31.9 10/01/2024 05:20 AM     10/01/2024 05:20 AM     Chemistry:    Lab Results   Component Value Date/Time    PHART 7.434 2024 06:22 PM    WOB1MTQ 34.1 2024 06:22 PM    PO2ART 79.9 2024 06:22 PM    Q6ZFJMUA 96.2 2024 06:22 PM    BRW3BTY 22.3 2024 06:22 PM    PBEA 3.5 09/15/2024 11:30 AM    NBEA 1.2 2024 06:22 PM       VITALS  Pulse: 78   Respirations: 16  BP: 122/78  SpO2: 90 % O2 Device: Nasal cannula  Temp: 97.2 °F (36.2 °C)    SKIN COLOR  [x] Normal  [] Pale  [] Dusky  [] Cyanotic    RESPIRATORY PATTERN  [] Normal  [x] Dyspnea  [] Cheyne-Granger  [] Kussmaul  [] Biots    AMBULATORY  [] Yes  [] No  [x] With Assistance    Patient Acuity 0 1 2 3 4 Score   Level of Consciousness (LOC) 
Acuity:  9-13  [x]  Secondary Assessment in 24 hrs Total Acuity:  4-8  []  Secondary Assessment in 24 hrs Total Acuity:  0-3  []  Secondary Assessment in 48 hrs   HHN AEROSOL THERAPY with  [physician-ordered bronchodilator(s)] q 4 & Albuterol PRN q2 hrs.   Breath-Actuated Neb if BBS Acuity = 4, and pt. can use MP. Notify physician if condition deteriorates.  HHN AEROSOL THERAPY with  [physician-ordered bronchodilator(s)]  QID and Albuterol PRN q4 hrs.   Breath-Actuated Neb if BBS Acuity = 4, and pt. can use MP.  Notify physician if condition deteriorates. MDI THERAPY with  2 actuations of [physician-ordered bronchodilator(s)] via spacer TID Albuterol and PRN q4 hrs.   If unable to utilize MDI: HHN [physician-ordered bronchodilator(s)] TID and Albuterol PRN q4 hrs.   Notify physician if condition deteriorates. MDI THERAPY with  [physician-ordered bronchodilator(s)] via spacer TID PRN.   If unable to utilize MDI: HHN [physician-ordered bronchodilator(s)] TID PRN.   Notify physician if condition deteriorates.         If Acuity Level is 2, 3, or 4 in any of the following:    [] COUGH     [] SURGICAL HISTORY (SURG HX)  [x] CHEST XRAY (CXR)    Goal: Improvement in sputum mobilization in patients with ineffective airway clearance. Reverse atelectasis.    [x] Bronchopulmonary Hygiene Protocol      Total Acuity:   14-28  []  Secondary Assessment in 24 hrs Total Acuity:  9-13  [x]  Secondary Assessment in 24 hrs Total Acuity:  4-8  []  Secondary Assessment in 24 hrs Total Acuity:  0-3  []  Secondary Assessment in 48 hrs   METANEB QID with [physician-ordered bronchodilator(s)] if CXR Acuity = 4; otherwise:  PD&P, Oscillatory Therapy, or Vest QID & PRN AND PEP QID & PRN  NT Sxn PRN for ineffective cough  METANEB QID with [physician-ordered bronchodilator(s)] if CXR Acuity = 4; otherwise:  PD&P, Oscillatory Therapy or Vest QID & PRN AND PEP QID & PRN  NT Sxn PRN for ineffective cough  PD&P, Oscillatory Therapy, or Vest TID & PRN 
Assessment in 24 hrs Total Acuity:  4-8  []  Secondary Assessment in 24 hrs Total Acuity:  0-3  []  Secondary Assessment in 48 hrs   HHN AEROSOL THERAPY with  [physician-ordered bronchodilator(s)] q 4 & Albuterol PRN q2 hrs.   Breath-Actuated Neb if BBS Acuity = 4, and pt. can use MP. Notify physician if condition deteriorates.  HHN AEROSOL THERAPY with  [physician-ordered bronchodilator(s)]  QID and Albuterol PRN q4 hrs.   Breath-Actuated Neb if BBS Acuity = 4, and pt. can use MP.  Notify physician if condition deteriorates. MDI THERAPY with  2 actuations of [physician-ordered bronchodilator(s)] via spacer TID Albuterol and PRN q4 hrs.   If unable to utilize MDI: HHN [physician-ordered bronchodilator(s)] TID and Albuterol PRN q4 hrs.   Notify physician if condition deteriorates. MDI THERAPY with  [physician-ordered bronchodilator(s)] via spacer TID PRN.   If unable to utilize MDI: HHN [physician-ordered bronchodilator(s)] TID PRN.   Notify physician if condition deteriorates.         If Acuity Level is 2, 3, or 4 in any of the following:    [] COUGH     [] SURGICAL HISTORY (SURG HX)  [x] CHEST XRAY (CXR)    Goal: Improvement in sputum mobilization in patients with ineffective airway clearance. Reverse atelectasis.    [x] Bronchopulmonary Hygiene Protocol      Total Acuity:   14-28  []  Secondary Assessment in 24 hrs Total Acuity:  9-13  [x]  Secondary Assessment in 24 hrs Total Acuity:  4-8  []  Secondary Assessment in 24 hrs Total Acuity:  0-3  []  Secondary Assessment in 48 hrs   METANEB QID with [physician-ordered bronchodilator(s)] if CXR Acuity = 4; otherwise:  PD&P, Oscillatory Therapy, or Vest QID & PRN AND PEP QID & PRN  NT Sxn PRN for ineffective cough  METANEB QID with [physician-ordered bronchodilator(s)] if CXR Acuity = 4; otherwise:  PD&P, Oscillatory Therapy or Vest QID & PRN AND PEP QID & PRN  NT Sxn PRN for ineffective cough  PD&P, Oscillatory Therapy, or Vest TID & PRN AND PEP TID & PRN   Instruct 
self-perform IS q1hr WA       If Acuity Level is 2 or above in the following:    [] PULMONARY HISTORY (PULM HX)    Goal: Assist patient in quitting smoking to slow or stop the progression of lung disease.    [] Smoking Cessation Protocol    SMOKING CESSATION EDUCATION provided according to policy RT_201: (marlyn with an X)  ____Yes    ____ No     ____ NA    Smoking Cessation Booklet given:  ____Yes  ____No ____Patient Refused

## 2024-10-02 NOTE — PLAN OF CARE
Problem: Discharge Planning  Goal: Discharge to home or other facility with appropriate resources  10/1/2024 0655 by Roselia Funes  Outcome: Progressing  Flowsheets (Taken 10/1/2024 0655)  Discharge to home or other facility with appropriate resources:   Identify barriers to discharge with patient and caregiver   Identify discharge learning needs (meds, wound care, etc)   Refer to discharge planning if patient needs post-hospital services based on physician order or complex needs related to functional status, cognitive ability or social support system   Arrange for needed discharge resources and transportation as appropriate  10/1/2024 0312 by Alina Aldrich, RN  Outcome: Progressing  Flowsheets  Taken 10/1/2024 0300  Discharge to home or other facility with appropriate resources: Identify barriers to discharge with patient and caregiver  Taken 9/30/2024 2300  Discharge to home or other facility with appropriate resources: Identify barriers to discharge with patient and caregiver  Taken 9/30/2024 1910  Discharge to home or other facility with appropriate resources: Identify barriers to discharge with patient and caregiver     Problem: Pain  Goal: Verbalizes/displays adequate comfort level or baseline comfort level  10/1/2024 0655 by Roselia Funes  Outcome: Progressing  Flowsheets (Taken 10/1/2024 0655)  Verbalizes/displays adequate comfort level or baseline comfort level:   Encourage patient to monitor pain and request assistance   Administer analgesics based on type and severity of pain and evaluate response   Assess pain using appropriate pain scale   Implement non-pharmacological measures as appropriate and evaluate response  10/1/2024 0312 by Alina Aldrich, RN  Outcome: Progressing  Note: Notify staff of returning or increasing pain. Utilize pain medication as appropriate. Use non-pharmaceutical means for pain management ie: warm blanket, ice, repositioning.       Problem: Safety - Adult  Goal: Free from fall 
  Problem: Discharge Planning  Goal: Discharge to home or other facility with appropriate resources  Outcome: Progressing     Problem: Pain  Goal: Verbalizes/displays adequate comfort level or baseline comfort level  9/30/2024 0643 by Emily Macias RN  Outcome: Progressing  9/29/2024 2117 by Kallie Henderson RN  Outcome: Progressing  Flowsheets (Taken 9/29/2024 2117)  Verbalizes/displays adequate comfort level or baseline comfort level: Assess pain using appropriate pain scale  Note: Pt denies pain at this time. Pain meds given as needed & as ordered.      Problem: Safety - Adult  Goal: Free from fall injury  9/30/2024 0643 by Emily Macias RN  Outcome: Progressing  9/29/2024 2117 by Kallie Henderson RN  Outcome: Progressing  Flowsheets (Taken 9/29/2024 2117)  Free From Fall Injury: Instruct family/caregiver on patient safety  Note: Pt is at high risk for falls. Non skid socks on. Bed in low position and wheels locked. Fall sign posted and bracelet on. Siderails up x 2. Bed alarm on. Call light within reach.      Problem: Skin/Tissue Integrity  Goal: Absence of new skin breakdown  Description: 1.  Monitor for areas of redness and/or skin breakdown  2.  Assess vascular access sites hourly  3.  Every 4-6 hours minimum:  Change oxygen saturation probe site  4.  Every 4-6 hours:  If on nasal continuous positive airway pressure, respiratory therapy assess nares and determine need for appliance change or resting period.  9/30/2024 0643 by Emily Macias RN  Outcome: Progressing  9/29/2024 2117 by Kallie Henderson RN  Outcome: Progressing  Note: Skin assessed at regular intervals. No open areas noted at this time. Pt able to reposition per self. Assistance given if needed.      Problem: Respiratory - Adult  Goal: Achieves optimal ventilation and oxygenation  9/30/2024 0643 by Emily Macias RN  Outcome: Progressing  9/29/2024 2117 by Kallie Henderson RN  Outcome: Progressing  Flowsheets (Taken 
  Problem: Discharge Planning  Goal: Discharge to home or other facility with appropriate resources  Outcome: Progressing  Flowsheets  Taken 10/1/2024 0300  Discharge to home or other facility with appropriate resources: Identify barriers to discharge with patient and caregiver  Taken 9/30/2024 2300  Discharge to home or other facility with appropriate resources: Identify barriers to discharge with patient and caregiver  Taken 9/30/2024 1910  Discharge to home or other facility with appropriate resources: Identify barriers to discharge with patient and caregiver     Problem: Pain  Goal: Verbalizes/displays adequate comfort level or baseline comfort level  Outcome: Progressing  Note: Notify staff of returning or increasing pain. Utilize pain medication as appropriate. Use non-pharmaceutical means for pain management ie: warm blanket, ice, repositioning.       Problem: Safety - Adult  Goal: Free from fall injury  Outcome: Progressing  Note: Call light and bedside table with in reach. Bed and chair wheels locked at all times, with bed in lowest position. Frequent checks and needs meet in appropriate timing.      Problem: Skin/Tissue Integrity  Goal: Absence of new skin breakdown  Description: 1.  Monitor for areas of redness and/or skin breakdown  2.  Assess vascular access sites hourly  3.  Every 4-6 hours minimum:  Change oxygen saturation probe site  4.  Every 4-6 hours:  If on nasal continuous positive airway pressure, respiratory therapy assess nares and determine need for appliance change or resting period.  Outcome: Progressing  Note: Eusebio scale monitoring. inspect skin for breakdown. Chart all shin breakdown. Encourage frequent repositioning. No new breakdown.     Problem: Respiratory - Adult  Goal: Achieves optimal ventilation and oxygenation  Outcome: Progressing     Problem: Chronic Conditions and Co-morbidities  Goal: Patient's chronic conditions and co-morbidity symptoms are monitored and maintained or 
  Problem: Discharge Planning  Goal: Discharge to home or other facility with appropriate resources  Outcome: Progressing  Flowsheets (Taken 10/1/2024 1907)  Discharge to home or other facility with appropriate resources: Identify barriers to discharge with patient and caregiver     Problem: Pain  Goal: Verbalizes/displays adequate comfort level or baseline comfort level  Outcome: Progressing     Problem: Safety - Adult  Goal: Free from fall injury  Outcome: Progressing  Flowsheets (Taken 10/2/2024 0208)  Free From Fall Injury: Instruct family/caregiver on patient safety     Problem: Skin/Tissue Integrity  Goal: Absence of new skin breakdown  Description: 1.  Monitor for areas of redness and/or skin breakdown  2.  Assess vascular access sites hourly  3.  Every 4-6 hours minimum:  Change oxygen saturation probe site  4.  Every 4-6 hours:  If on nasal continuous positive airway pressure, respiratory therapy assess nares and determine need for appliance change or resting period.  Outcome: Progressing     Problem: Respiratory - Adult  Goal: Achieves optimal ventilation and oxygenation  Outcome: Progressing  Flowsheets (Taken 10/1/2024 1907)  Achieves optimal ventilation and oxygenation: Assess for changes in respiratory status     Problem: Chronic Conditions and Co-morbidities  Goal: Patient's chronic conditions and co-morbidity symptoms are monitored and maintained or improved  Outcome: Progressing  Flowsheets (Taken 10/1/2024 1907)  Care Plan - Patient's Chronic Conditions and Co-Morbidity Symptoms are Monitored and Maintained or Improved: Monitor and assess patient's chronic conditions and comorbid symptoms for stability, deterioration, or improvement     Problem: Nutrition Deficit:  Goal: Optimize nutritional status  Outcome: Progressing     
  Problem: Discharge Planning  Goal: Discharge to home or other facility with appropriate resources  Outcome: Progressing  Flowsheets (Taken 9/29/2024 1033)  Discharge to home or other facility with appropriate resources: Identify barriers to discharge with patient and caregiver     Problem: Pain  Goal: Verbalizes/displays adequate comfort level or baseline comfort level  Outcome: Progressing     Problem: Safety - Adult  Goal: Free from fall injury  Outcome: Progressing  Flowsheets (Taken 9/29/2024 1033)  Free From Fall Injury: Instruct family/caregiver on patient safety     Problem: Skin/Tissue Integrity  Goal: Absence of new skin breakdown  Description: 1.  Monitor for areas of redness and/or skin breakdown  2.  Assess vascular access sites hourly  3.  Every 4-6 hours minimum:  Change oxygen saturation probe site  4.  Every 4-6 hours:  If on nasal continuous positive airway pressure, respiratory therapy assess nares and determine need for appliance change or resting period.  Outcome: Progressing     Problem: Respiratory - Adult  Goal: Achieves optimal ventilation and oxygenation  Outcome: Progressing  Flowsheets (Taken 9/29/2024 1033)  Achieves optimal ventilation and oxygenation:   Assess for changes in respiratory status   Assess for changes in mentation and behavior     
mentation and behavior   Oxygen supplementation based on oxygen saturation or arterial blood gases   Encourage broncho-pulmonary hygiene including cough, deep breathe, incentive spirometry   Assess and instruct to report shortness of breath or any respiratory difficulty  Note: Pulse ox WNL. O2 on @ 4 L per nasal canula and uses BiPAP. SOB noted with exertion. Pt denies any discomfort. Will continue to assess.

## 2024-10-02 NOTE — DISCHARGE SUMMARY
INHALATIONS BY MOUTH INTO  THE LUNGS 4 TIMES DAILY AS  NEEDED FOR WHEEZING     vitamin C 500 MG tablet  Commonly known as: ASCORBIC ACID     Xarelto 20 MG Tabs tablet  Generic drug: rivaroxaban  TAKE 1 TABLET BY MOUTH DAILY  WITH BREAKFAST               Where to Get Your Medications        These medications were sent to [facility] - [place], [place] - [address] [place] [address] - P [phone] - F [phone]  [address] [facility] [address], [facility] [address]      Phone: [phone]   alendronate 70 MG tablet       You can get these medications from any pharmacy    Bring a paper prescription for each of these medications  HYDROcodone-acetaminophen  MG per tablet  HYDROmorphone 2 MG tablet  levoFLOXacin 750 MG tablet       Information about where to get these medications is not yet available    Ask your nurse or doctor about these medications  nystatin [id number] UNIT/ML suspension         Patient Instructions:   Activity: activity as tolerated  Diet: cardiac diet  Wound Care: none needed  Other: na     Disposition:   DC to [place]    Follow up:  Patient will be followed by Jong Moraes MD in 1-2 weeks    CORE MEASURES on Discharge (if applicable)  ACE/ARB in CHF: NA  Statin in MI: NA  ASA in MI: NA  Statin in CVA: NA  Antiplatelet in CVA: NA    Total time spent on discharge services: 45 minutes    Including the following activities:  Evaluation and Management of patient  Discussion with patient and/or surrogate about current care plan  Coordination with Case Management and/or   Coordination of care with Consultants (if applicable)   Coordination of care with Receiving Facility Physician (if applicable)  Completion of DME forms (if applicable)  Preparation of Discharge Summary  Preparation of Medication Reconciliation  Preparation of Discharge Prescriptions    Signed:  RYAN Harper - CNP, APRN, NP-C  10/2/2024, 1:25 PM

## 2024-10-02 NOTE — DISCHARGE INSTR - COC
Continuity of Care Form    Patient Name: Toshia South   :  1945  MRN:  [id number]    Admit date:  2024  Discharge date:  10/2/2024    Code Status Order: Full Code   Advance Directives:   Advance Care Flowsheet Documentation             Admitting Physician:  Jong Moraes MD  PCP: Jong Moraes MD    Discharging Nurse: Aidee WONG  Discharging Hospital Unit/Room#: 0312/0312-01  Discharging Unit Phone Number: [phone]    Emergency Contact:   Extended Emergency Contact Information  Primary Emergency Contact: Jerrell South  Address: [address] of [place]  Home Phone: [phone]  Mobile Phone: [phone]  Relation: Spouse  Hearing or visual needs: None  Other needs: None  Preferred language: English   needed? No  Secondary Emergency Contact: Jose Eduardo South  Home Phone: [phone]  Relation: Child   needed? No    Past Surgical History:  Past Surgical History:   Procedure Laterality Date    BACK SURGERY  1998    spinal cord stimulator    BACK SURGERY  1999    infusion pump insertion    BACK SURGERY  10/23/2003    spinal cord stimulator removed    BACK SURGERY  10/23/2003    new infusion pump placed    BACK SURGERY  2017    replaced infusion pump    CARPAL TUNNEL RELEASE Right 1999    CARPAL TUNNEL RELEASE Left 1999    CARPAL TUNNEL RELEASE Right 2002    CARPAL TUNNEL RELEASE Left 2003    CERVICAL DISC SURGERY  10/25/2004    anterior cervical diskectomy C7-T1    CERVICAL DISC SURGERY  2004    anterior cervical discectomy C6-7 (complicated by damaged nerve to vocal cord)    CRANIOTOMY Left 2022    LEFT CRANIOTOMY FOR SUBDURAL HEMATOMA EVACUATION performed by Yessica Lewis DO at [facility] OR    FRACTURE SURGERY Left 2018    ORIF wrist    HUMERUS FRACTURE SURGERY Right 10/03/2010    HYSTERECTOMY (CERVIX STATUS UNKNOWN)  1991    KNEE ARTHROSCOPY Right 2011

## 2024-10-03 ENCOUNTER — CARE COORDINATION (OUTPATIENT)
Dept: CARE COORDINATION | Age: 79
End: 2024-10-03

## 2024-10-07 RX ORDER — RIVAROXABAN 20 MG/1
20 TABLET, FILM COATED ORAL DAILY
Qty: 90 TABLET | Refills: 3 | Status: SHIPPED | OUTPATIENT
Start: 2024-10-07

## 2024-10-08 PROBLEM — F33.1 MAJOR DEPRESSIVE DISORDER, RECURRENT, MODERATE (HCC): Status: RESOLVED | Noted: 2024-02-13 | Resolved: 2024-10-08

## 2024-10-08 PROBLEM — F33.9 MAJOR DEPRESSIVE DISORDER, RECURRENT, UNSPECIFIED (HCC): Status: RESOLVED | Noted: 2024-02-13 | Resolved: 2024-10-08

## 2024-10-08 PROBLEM — F33.0 MAJOR DEPRESSIVE DISORDER, RECURRENT, MILD (HCC): Status: RESOLVED | Noted: 2024-02-13 | Resolved: 2024-10-08

## 2024-10-09 ENCOUNTER — OFFICE VISIT (OUTPATIENT)
Age: 79
End: 2024-10-09
Payer: MEDICARE

## 2024-10-09 VITALS
RESPIRATION RATE: 18 BRPM | BODY MASS INDEX: 22.16 KG/M2 | OXYGEN SATURATION: 95 % | SYSTOLIC BLOOD PRESSURE: 123 MMHG | HEIGHT: 65 IN | WEIGHT: 133 LBS | DIASTOLIC BLOOD PRESSURE: 68 MMHG | HEART RATE: 91 BPM

## 2024-10-09 DIAGNOSIS — Z79.01 LONG TERM (CURRENT) USE OF ANTICOAGULANTS: ICD-10-CM

## 2024-10-09 DIAGNOSIS — M47.817 LUMBOSACRAL SPONDYLOSIS WITHOUT MYELOPATHY: ICD-10-CM

## 2024-10-09 DIAGNOSIS — M54.16 LUMBAR RADICULOPATHY: Primary | ICD-10-CM

## 2024-10-09 DIAGNOSIS — M96.1 LUMBAR POST-LAMINECTOMY SYNDROME: ICD-10-CM

## 2024-10-09 DIAGNOSIS — M51.369 DEGENERATION OF INTERVERTEBRAL DISC OF LUMBAR REGION, UNSPECIFIED WHETHER PAIN PRESENT: ICD-10-CM

## 2024-10-09 PROCEDURE — 1111F DSCHRG MED/CURRENT MED MERGE: CPT | Performed by: STUDENT IN AN ORGANIZED HEALTH CARE EDUCATION/TRAINING PROGRAM

## 2024-10-09 PROCEDURE — 1123F ACP DISCUSS/DSCN MKR DOCD: CPT | Performed by: STUDENT IN AN ORGANIZED HEALTH CARE EDUCATION/TRAINING PROGRAM

## 2024-10-09 PROCEDURE — G8420 CALC BMI NORM PARAMETERS: HCPCS | Performed by: STUDENT IN AN ORGANIZED HEALTH CARE EDUCATION/TRAINING PROGRAM

## 2024-10-09 PROCEDURE — G8399 PT W/DXA RESULTS DOCUMENT: HCPCS | Performed by: STUDENT IN AN ORGANIZED HEALTH CARE EDUCATION/TRAINING PROGRAM

## 2024-10-09 PROCEDURE — 1036F TOBACCO NON-USER: CPT | Performed by: STUDENT IN AN ORGANIZED HEALTH CARE EDUCATION/TRAINING PROGRAM

## 2024-10-09 PROCEDURE — G8484 FLU IMMUNIZE NO ADMIN: HCPCS | Performed by: STUDENT IN AN ORGANIZED HEALTH CARE EDUCATION/TRAINING PROGRAM

## 2024-10-09 PROCEDURE — 1090F PRES/ABSN URINE INCON ASSESS: CPT | Performed by: STUDENT IN AN ORGANIZED HEALTH CARE EDUCATION/TRAINING PROGRAM

## 2024-10-09 PROCEDURE — G8427 DOCREV CUR MEDS BY ELIG CLIN: HCPCS | Performed by: STUDENT IN AN ORGANIZED HEALTH CARE EDUCATION/TRAINING PROGRAM

## 2024-10-09 PROCEDURE — 99213 OFFICE O/P EST LOW 20 MIN: CPT | Performed by: STUDENT IN AN ORGANIZED HEALTH CARE EDUCATION/TRAINING PROGRAM

## 2024-10-09 NOTE — PROGRESS NOTES
Chronic Pain Clinic Note     Encounter Date: 10/9/2024     SUBJECTIVE:  Chief Complaint   Patient presents with    Pain     Patient presents for f/u after ASHLEY.       History of Present Illness:   Toshia South is a 79 y.o. female who presents with low back pain.    Medication Refill: n/a     Current Complaints of Pain:   Location: Low back  Radiation: LLE  Severity: severe  Pain Numerical Score - 8   Average: 8     Highest: 10  Lowest: 8  Character/Quality: Complains of pain that is aching, burning, dull, sharp, throbbing \"all of it\"  Timing: Wakes from sleep, constant   Associated symptoms: weakness  Numbness: no  Weakness: LLE   Exacerbating factors:  walking   Alleviating factors: Rest  Length of time pain has been present: Started on - chronic   Inciting event/injury: no   Bowel/Bladder incontinence: no   Falls: yes   Physical Therapy: yes    History of Interventions:   Surgery: H/O Lumbar and cervical surgeries  Injections: yes     Imaging:    CT Lumbar 2/13/24  CT Cervical 2/13/24     Past Medical History:   Diagnosis Date    A-fib (HCC)     Anxiety     Atrial fibrillation (HCC)     Biventricular congestive heart failure (HCC)     Bronchiectasis with acute exacerbation (HCC) 04/29/2022    CAD (coronary artery disease)     Chronic pain syndrome     dilaudid infusion pump    COPD (chronic obstructive pulmonary disease) (HCC)     Depression     GERD (gastroesophageal reflux disease)     Hypothyroidism     Impaired fasting glucose     Iron deficiency anemia     Osteoporosis     Pulmonary fibrosis (HCC) 04/29/2022    Subdural hematoma 08/23/2022       Past Surgical History:   Procedure Laterality Date    BACK SURGERY  09/09/1998    spinal cord stimulator    BACK SURGERY  08/04/1999    infusion pump insertion    BACK SURGERY  10/23/2003    spinal cord stimulator removed    BACK SURGERY  10/23/2003    new infusion pump placed    BACK SURGERY  09/11/2017    replaced infusion pump    CARPAL TUNNEL RELEASE Right

## 2024-10-09 NOTE — PATIENT INSTRUCTIONS
SURVEY:    You may be receiving a survey from [facility] regarding your visit today.    You may get this in the mail, through your MyChart, or in your email.     Please complete the survey to enable us to provide the highest quality of care to you and your family.    If you cannot score us a very good (5 Stars) on any question, please call the office to discuss how we could of made your experience exceptional.    Thank you!  Dr. Josemanuel Loyd, DO [place], ANDRES Awad LPN Jena Adams, MA Emily Akers, MA    Phone: [phone]  Fax: [phone]    Office Hours:   M-TH 8-5, F: 8-12

## 2024-10-10 ENCOUNTER — CARE COORDINATION (OUTPATIENT)
Dept: CARE COORDINATION | Age: 79
End: 2024-10-10

## 2024-10-10 NOTE — CARE COORDINATION
Care Transitions Post-Acute Facility Update Call    10/10/2024    Patient: Toshia South Patient : 1945   MRN: [id number]  Reason for Admission:    Discharge Date: 10/2/24 RARS: Readmission Risk Score: 25.4    Toshia South- pivots only with staff, mod assist with transfers, very fearful of falling, 4-5l of oxygen, very very weak.      Care Transitions Post Acute Facility Update    Care Transitions Interventions     Other Therapy Services: Completed    Home Care Waiver: Completed Physical Therapy: Completed       Transportation Support: Declined      DME Assistance: Declined   Disease Specific Clinic: Completed Occupational Therapy: Completed     Disease Association: Completed            Post Acute Facility Update   Post Acute Facility: [facility]          Nursing   Wounds: Neg   Skilled Medication therapies: PT /OT / nursing   Disease specific clinical considerations: MDD, chronic back pain, iron deficiency anemia, COPD, biventricular CHF, PAF, IPF, SDH, GERD, O2 dependency, acute on chronic respiratory failure with hypoxia, pneumonia, biventricular heart failure, citrobacter infection, dysphagia, zenker diverticulum, bronchiectasis, malnutrition, secondary hypercoagulable state, back surgery, CTS release, cervical discectomy, craniotomy, wrist surgery, humerus fx surgery, hysterectomy, knee surgery, lumbar discectomy, neck surgery, toe amputation, R TKA, chronic pain syndrome, dilaudid infusion pump, osteoporosis    Reported Nursing Updates: VS /67 P 71 R 18 T 97.4 SP02 08%     Oxygen dependent - needs new BIPAP ; has sleep study 11/15       Rehab/Functional   Cognitive function assessment: ALERT AND ORIENTED X 3   ADLs: Moderate Assistance   Bed Mobility: Minimal Assistance   Transfer Assistance: Minimal Assistance   Ambulation Assistance: Dependent   How far (in feet) is the patient ambulating?: 0   Does patient use an assistive device?: Yes   Assistive Devices: FWW, 4WW, cane,

## 2024-10-17 ENCOUNTER — CARE COORDINATION (OUTPATIENT)
Dept: CARE COORDINATION | Age: 79
End: 2024-10-17

## 2024-10-17 NOTE — CARE COORDINATION
Care Transitions Post-Acute Facility Update Call    10/17/2024    Patient: Toshia South Patient : 1945   MRN: [id number]  Reason for Admission:    Discharge Date: 10/2/24 RARS: Readmission Risk Score: 25.4    using Yenni Steady, slight decline, requires two people for Yenni Steady for toileting, left leg pain, bouncy when she stands, unsure of her d/c plan.     Care Transitions Post Acute Facility Update    Care Transitions Interventions     Other Therapy Services: Completed    Home Care Waiver: Completed Physical Therapy: Completed       Transportation Support: Declined      DME Assistance: Declined   Disease Specific Clinic: Completed Occupational Therapy: Completed     Disease Association: Completed            Post Acute Facility Update   Post Acute Facility: [facility]   Wounds: Neg   Skilled Medication therapies: PT /OT / nursing   Disease specific clinical considerations: MDD, chronic back pain, iron deficiency anemia, COPD, biventricular CHF, PAF, IPF, SDH, GERD, O2 dependency, acute on chronic respiratory failure with hypoxia, pneumonia, biventricular heart failure, citrobacter infection, dysphagia, zenker diverticulum, bronchiectasis, malnutrition, secondary hypercoagulable state, back surgery, CTS release, cervical discectomy, craniotomy, wrist surgery, humerus fx surgery, hysterectomy, knee surgery, lumbar discectomy, neck surgery, toe amputation, R TKA, chronic pain syndrome, dilaudid infusion pump, osteoporosis    Reported Nursing Updates: VS /67 P 71 R 18 T 97.4 SP02 08%     Oxygen dependent - needs new BIPAP ; has sleep study 11/15       Rehab/Functional   Cognitive function assessment: ALERT AND ORIENTED X 3   ADLs: Moderate Assistance   Bed Mobility: Minimal Assistance   Transfer Assistance: Minimal Assistance   Ambulation Assistance: Dependent   How far (in feet) is the patient ambulating?: 0   Does patient use an assistive device?: Yes   Assistive Devices:

## 2024-10-21 ENCOUNTER — OFFICE VISIT (OUTPATIENT)
Dept: PULMONOLOGY | Age: 79
End: 2024-10-21
Payer: MEDICARE

## 2024-10-21 VITALS
HEART RATE: 74 BPM | HEIGHT: 60 IN | BODY MASS INDEX: 26.11 KG/M2 | TEMPERATURE: 96.6 F | RESPIRATION RATE: 16 BRPM | DIASTOLIC BLOOD PRESSURE: 61 MMHG | WEIGHT: 133 LBS | OXYGEN SATURATION: 97 % | SYSTOLIC BLOOD PRESSURE: 129 MMHG

## 2024-10-21 DIAGNOSIS — J47.9 BRONCHIECTASIS WITHOUT COMPLICATION (HCC): Primary | ICD-10-CM

## 2024-10-21 DIAGNOSIS — K21.9 GASTROESOPHAGEAL REFLUX DISEASE, UNSPECIFIED WHETHER ESOPHAGITIS PRESENT: ICD-10-CM

## 2024-10-21 DIAGNOSIS — K44.9 HIATAL HERNIA: ICD-10-CM

## 2024-10-21 DIAGNOSIS — I48.0 PAF (PAROXYSMAL ATRIAL FIBRILLATION) (HCC): ICD-10-CM

## 2024-10-21 DIAGNOSIS — J96.11 CHRONIC HYPOXEMIC RESPIRATORY FAILURE: ICD-10-CM

## 2024-10-21 DIAGNOSIS — J84.10 PULMONARY FIBROSIS (HCC): ICD-10-CM

## 2024-10-21 DIAGNOSIS — Z86.16 HISTORY OF COVID-19: ICD-10-CM

## 2024-10-21 DIAGNOSIS — J18.9 PNEUMONIA OF RIGHT LOWER LOBE DUE TO INFECTIOUS ORGANISM: ICD-10-CM

## 2024-10-21 PROCEDURE — 99214 OFFICE O/P EST MOD 30 MIN: CPT | Performed by: INTERNAL MEDICINE

## 2024-10-21 NOTE — PROGRESS NOTES
PULMONARY OP  PROGRESS NOTE      Patient:  Toshia South  YOB: 1945    MRN: [id number]     Acct: [id number]       Pt seen and Chart reviewed.  Toshia South is here in followup for   1. Bronchiectasis without complication (HCC)    2. Hiatal hernia    3. Gastroesophageal reflux disease, unspecified whether esophagitis present    4. Pulmonary fibrosis (HCC)    5. History of COVID-19    6. Chronic hypoxemic respiratory failure    7. PAF (paroxysmal atrial fibrillation) (HCC)    8. Pneumonia of right lower lobe due to infectious organism          History of Present Illness  The patient presents for evaluation of multiple medical concerns. She is accompanied by an adult female.    She was hospitalized due to pneumonia and is currently on 4 L of oxygen. Her condition has improved since mid-September 2024. She experiences intermittent coughing, producing yellow phlegm, but reports no presence of blood or brown-colored sputum. She has not had a fever, and her appetite remains normal. She has lost 5 pounds since July 2024. She finds it difficult to lie flat due to breathing difficulties and prefers to sit up. She experiences significant shortness of breath and has had episodes of breathlessness, one of which led to her hospitalization. She has no issues using oxygen and reports no nosebleeds. She uses DuoNeb twice daily and Anoro inhaler once daily. She does not require any medication refills at this time. She has a history of frequent pneumonia.    She has noticed increased swelling in her feet, with the left leg being more affected than the right. She has been experiencing pain in her left leg, extending from her midback to her foot, for a long time. She has not sought medical attention for this issue yet.    Her atrial fibrillation is currently not causing any discomfort.    She has a history of acid reflux, which is well-controlled.    She takes a multivitamin tablet daily.    She believes she

## 2024-10-24 ENCOUNTER — CARE COORDINATION (OUTPATIENT)
Dept: CARE COORDINATION | Age: 79
End: 2024-10-24

## 2024-10-24 NOTE — CARE COORDINATION
Care Transitions Post-Acute Facility Update Call    10/24/2024    Patient: Toshia South Patient : 1945   MRN: [id number]  Reason for Admission:    Discharge Date: 10/2/24 RARS: Readmission Risk Score: 25.4    Walk with therapy with walker, walked self to bathroom, getting a lot of help when going home  Couple more weeks no discharge date       Care Transitions Post Acute Facility Update          Post Acute Facility Update   Post Acute Facility: [facility]          Nursing  Pt is noncompliant on fluid restriction;    Wounds: Neg   Skilled Medication therapies: PT /OT / nursing   Disease specific clinical considerations: MDD, chronic back pain, iron deficiency anemia, COPD, biventricular CHF, PAF, IPF, SDH, GERD, O2    Reported Nursing Updates: VS /65 P 71 R 18 T 98.2 SP02 99%     Oxygen dependent - needs new BIPAP ; has sleep study 11/15       Rehab/Functional   Cognitive function assessment: ALERT AND ORIENTED X 3   ADLs: Moderate Assistance   Bed Mobility: Minimal Assistance   Transfer Assistance: Minimal Assistance   Ambulation Assistance: Dependent   How far (in feet) is the patient ambulating?: 0   Does patient use an assistive device?: Yes   Assistive Devices: FWW, 4WW, cane, wheelchair   Rehab Notes: Prior Living Description: The patient lives home with  in a two story home with 1 + 1 steps to enter home without rails. The patient normally walks with a FWW or a SBQC. The patient sleeps in recliner part of a sectional couch. Patient performs all IADL's such as cooking, cleaning, etc. Does not drive. Bathroom is a tub shower combo, usually stands but does have a seat. Has been on nectar thick liquids at home but would take little sips of thin water at home.         SW/Discharge Planning   Goals of Care: return to PLOF; independent   Caregiver support:    Anticipated date for discharge:    Discharge Planning / Barriers: Return home to      Walk with therapy with

## 2024-10-31 ENCOUNTER — CARE COORDINATION (OUTPATIENT)
Dept: CARE COORDINATION | Age: 79
End: 2024-10-31

## 2024-10-31 NOTE — CARE COORDINATION
Care Transitions Post-Acute Facility Update Call    10/31/2024    Patient: Toshia South Patient : 1945   MRN: [id number]  Reason for Admission:    Discharge Date: 10/2/24 RARS: Readmission Risk Score: 25.4    Brannon- weight gain, weekly weights ordered      Care Transitions Post Acute Facility Update          Post Acute Facility Update   Post Acute Facility: [facility]          Nursing- Pt is noncompliant on fluid restriction;    Wounds: Neg   Skilled Medication therapies: PT /OT / nursing   Disease specific clinical considerations: MDD, chronic back pain, iron deficiency anemia, COPD, biventricular CHF, PAF, IPF, SDH, GERD, O2    Reported Nursing Updates: VS /64 P 69 R 18 T 98.2 SP02 100%     Oxygen dependent - needs new BIPAP ; has sleep study 11/15       Rehab/Functional   Cognitive function assessment: ALERT AND ORIENTED X 3   ADLs: Moderate Assistance   Bed Mobility: Minimal Assistance   Transfer Assistance: Minimal Assistance   Ambulation Assistance: Dependent   How far (in feet) is the patient ambulating?: 0   Does patient use an assistive device?: Yes   Assistive Devices: FWW, 4WW, cane, wheelchair   Rehab Notes: Prior Living Description: The patient lives home with  in a two story home with 1 + 1 steps to enter home without rails. The patient normally walks with a FWW or a SBQC. The patient sleeps in recliner part of a sectional couch. Patient performs all IADL's such as cooking, cleaning, etc. Does not drive. Bathroom is a tub shower combo, usually stands but does have a seat. Has been on nectar thick liquids at home but would take little sips of thin water at home.         SW/Discharge Planning   Goals of Care: return to PLOF; independent   Caregiver support:    Anticipated date for discharge:    Discharge Planning / Barriers: Return home to      Walk with therapy with walker, walked self to bathroom, getting a lot of help when going home  Couple more

## 2024-11-02 ENCOUNTER — HOSPITAL ENCOUNTER (INPATIENT)
Age: 79
LOS: 1 days | Discharge: HOME | DRG: 193 | End: 2024-11-04
Attending: STUDENT IN AN ORGANIZED HEALTH CARE EDUCATION/TRAINING PROGRAM | Admitting: INTERNAL MEDICINE
Payer: MEDICARE

## 2024-11-02 DIAGNOSIS — A41.9 SEPTICEMIA (HCC): ICD-10-CM

## 2024-11-02 DIAGNOSIS — J96.01 ACUTE RESPIRATORY FAILURE WITH HYPOXIA: ICD-10-CM

## 2024-11-02 DIAGNOSIS — J18.9 RECURRENT PNEUMONIA: Primary | ICD-10-CM

## 2024-11-02 PROCEDURE — 85025 COMPLETE CBC W/AUTO DIFF WBC: CPT

## 2024-11-02 PROCEDURE — 93005 ELECTROCARDIOGRAM TRACING: CPT | Performed by: STUDENT IN AN ORGANIZED HEALTH CARE EDUCATION/TRAINING PROGRAM

## 2024-11-02 PROCEDURE — 87040 BLOOD CULTURE FOR BACTERIA: CPT

## 2024-11-02 PROCEDURE — 83880 ASSAY OF NATRIURETIC PEPTIDE: CPT

## 2024-11-02 PROCEDURE — 87635 SARS-COV-2 COVID-19 AMP PRB: CPT

## 2024-11-02 PROCEDURE — 99285 EMERGENCY DEPT VISIT HI MDM: CPT

## 2024-11-02 PROCEDURE — 84484 ASSAY OF TROPONIN QUANT: CPT

## 2024-11-02 PROCEDURE — 80053 COMPREHEN METABOLIC PANEL: CPT

## 2024-11-02 PROCEDURE — 36415 COLL VENOUS BLD VENIPUNCTURE: CPT

## 2024-11-02 PROCEDURE — 87804 INFLUENZA ASSAY W/OPTIC: CPT

## 2024-11-02 RX ORDER — IPRATROPIUM BROMIDE AND ALBUTEROL SULFATE 2.5; .5 MG/3ML; MG/3ML
1 SOLUTION RESPIRATORY (INHALATION) ONCE
Status: DISCONTINUED | OUTPATIENT
Start: 2024-11-03 | End: 2024-11-03

## 2024-11-03 ENCOUNTER — APPOINTMENT (OUTPATIENT)
Dept: GENERAL RADIOLOGY | Age: 79
DRG: 193 | End: 2024-11-03
Payer: MEDICARE

## 2024-11-03 PROBLEM — J96.20 ACUTE ON CHRONIC RESPIRATORY FAILURE: Status: ACTIVE | Noted: 2024-11-03

## 2024-11-03 LAB
ALBUMIN SERPL-MCNC: 4 G/DL (ref 3.5–5.2)
ALBUMIN/GLOB SERPL: 1.2 {RATIO} (ref 1–2.5)
ALP SERPL-CCNC: 91 U/L (ref 35–104)
ALT SERPL-CCNC: 9 U/L (ref 10–35)
ANION GAP SERPL CALCULATED.3IONS-SCNC: 10 MMOL/L (ref 9–16)
ARTERIAL PATENCY WRIST A: NO
ARTERIAL PATENCY WRIST A: YES
AST SERPL-CCNC: 22 U/L (ref 10–35)
B PARAP IS1001 DNA NPH QL NAA+NON-PROBE: NOT DETECTED
B PERT DNA SPEC QL NAA+PROBE: NOT DETECTED
BASOPHILS # BLD: <0.03 K/UL (ref 0–0.2)
BASOPHILS NFR BLD: 0 % (ref 0–2)
BDY SITE: ABNORMAL
BILIRUB SERPL-MCNC: <0.2 MG/DL (ref 0–1.2)
BNP SERPL-MCNC: 204 PG/ML (ref 0–450)
BODY TEMPERATURE: 37
BODY TEMPERATURE: 37
BUN SERPL-MCNC: 10 MG/DL (ref 8–23)
BUN/CREAT SERPL: 14 (ref 9–20)
C PNEUM DNA NPH QL NAA+NON-PROBE: NOT DETECTED
CALCIUM SERPL-MCNC: 9.8 MG/DL (ref 8.6–10.4)
CHLORIDE SERPL-SCNC: 98 MMOL/L (ref 98–107)
CO2 SERPL-SCNC: 32 MMOL/L (ref 20–31)
CREAT SERPL-MCNC: 0.7 MG/DL (ref 0.5–0.9)
CRP SERPL HS-MCNC: 12.5 MG/L (ref 0–5)
EKG ATRIAL RATE: 105 BPM
EKG P AXIS: 69 DEGREES
EKG P-R INTERVAL: 206 MS
EKG Q-T INTERVAL: 320 MS
EKG QRS DURATION: 80 MS
EKG QTC CALCULATION (BAZETT): 422 MS
EKG R AXIS: -8 DEGREES
EKG T AXIS: 15 DEGREES
EKG VENTRICULAR RATE: 105 BPM
EOSINOPHIL # BLD: 0.11 K/UL (ref 0–0.44)
EOSINOPHILS RELATIVE PERCENT: 2 % (ref 1–4)
ERYTHROCYTE [DISTWIDTH] IN BLOOD BY AUTOMATED COUNT: 13.4 % (ref 11.8–14.4)
FIO2 ON VENT: 40 %
FIO2 ON VENT: 40 %
FLUAV AG SPEC QL: NEGATIVE
FLUAV RNA NPH QL NAA+NON-PROBE: NOT DETECTED
FLUBV AG SPEC QL: NEGATIVE
FLUBV RNA NPH QL NAA+NON-PROBE: NOT DETECTED
GAS FLOW.O2 O2 DELIVERY SYS: ABNORMAL L/MIN
GAS FLOW.O2 O2 DELIVERY SYS: ABNORMAL L/MIN
GFR, ESTIMATED: 89 ML/MIN/1.73M2
GLUCOSE SERPL-MCNC: 117 MG/DL (ref 74–99)
HADV DNA NPH QL NAA+NON-PROBE: NOT DETECTED
HCO3 ARTERIAL: 31.3 MMOL/L (ref 22–26)
HCO3 VENOUS: 28.3 MMOL/L (ref 24–30)
HCOV 229E RNA NPH QL NAA+NON-PROBE: NOT DETECTED
HCOV HKU1 RNA NPH QL NAA+NON-PROBE: NOT DETECTED
HCOV NL63 RNA NPH QL NAA+NON-PROBE: NOT DETECTED
HCOV OC43 RNA NPH QL NAA+NON-PROBE: NOT DETECTED
HCT VFR BLD AUTO: 37.9 % (ref 36.3–47.1)
HGB BLD-MCNC: 12.1 G/DL (ref 11.9–15.1)
HMPV RNA NPH QL NAA+NON-PROBE: NOT DETECTED
HPIV1 RNA NPH QL NAA+NON-PROBE: NOT DETECTED
HPIV2 RNA NPH QL NAA+NON-PROBE: NOT DETECTED
HPIV3 RNA NPH QL NAA+NON-PROBE: NOT DETECTED
HPIV4 RNA NPH QL NAA+NON-PROBE: NOT DETECTED
IMM GRANULOCYTES # BLD AUTO: <0.03 K/UL (ref 0–0.3)
IMM GRANULOCYTES NFR BLD: 0 %
LACTATE BLDV-SCNC: 1.6 MMOL/L (ref 0.5–2.2)
LYMPHOCYTES NFR BLD: 1.55 K/UL (ref 1.1–3.7)
LYMPHOCYTES RELATIVE PERCENT: 26 % (ref 24–43)
M PNEUMO DNA NPH QL NAA+NON-PROBE: NOT DETECTED
MCH RBC QN AUTO: 30.9 PG (ref 25.2–33.5)
MCHC RBC AUTO-ENTMCNC: 31.9 G/DL (ref 28.4–34.8)
MCV RBC AUTO: 96.7 FL (ref 82.6–102.9)
MONOCYTES NFR BLD: 0.49 K/UL (ref 0.1–1.2)
MONOCYTES NFR BLD: 8 % (ref 3–12)
NEUTROPHILS NFR BLD: 64 % (ref 36–65)
NEUTS SEG NFR BLD: 3.73 K/UL (ref 1.5–8.1)
NRBC BLD-RTO: 0 PER 100 WBC
O2 SAT, ARTERIAL: 96.9 % (ref 95–98)
O2 SAT, VEN: 99.3 % (ref 60–85)
PCO2 ARTERIAL: 59.2 MMHG (ref 35–45)
PCO2 VENOUS: 42.5 MM HG (ref 39–55)
PCO2, ART, TEMP ADJ: 59.2 (ref 35–45)
PCO2, VEN, TEMP ADJ: 42.5 MMHG (ref 39–55)
PH ARTERIAL: 7.34 (ref 7.35–7.45)
PH VENOUS: 7.44 (ref 7.32–7.42)
PH, ART, TEMP ADJ: 7.34 (ref 7.35–7.45)
PH, VEN, TEMP ADJ: 7.44 (ref 7.32–7.42)
PLATELET # BLD AUTO: 258 K/UL (ref 138–453)
PMV BLD AUTO: 9.8 FL (ref 8.1–13.5)
PO2 ARTERIAL: 96.8 MMHG (ref 80–100)
PO2 VENOUS: 183.2 MM HG (ref 30–50)
PO2, ART, TEMP ADJ: 96.8 MMHG (ref 80–100)
PO2, VEN, TEMP ADJ: 183.2 MMHG (ref 30–50)
POSITIVE BASE EXCESS, ART: 3.8 MMOL/L (ref 0–2)
POSITIVE BASE EXCESS, VEN: 3.7 MMOL/L (ref 0–2)
POTASSIUM SERPL-SCNC: 3.9 MMOL/L (ref 3.7–5.3)
PROCALCITONIN SERPL-MCNC: 0.02 NG/ML (ref 0–0.09)
PROT SERPL-MCNC: 7.6 G/DL (ref 6.6–8.7)
RBC # BLD AUTO: 3.92 M/UL (ref 3.95–5.11)
RSV RNA NPH QL NAA+NON-PROBE: NOT DETECTED
RV+EV RNA NPH QL NAA+NON-PROBE: NOT DETECTED
SARS-COV-2 RDRP RESP QL NAA+PROBE: NOT DETECTED
SARS-COV-2 RNA NPH QL NAA+NON-PROBE: NOT DETECTED
SODIUM SERPL-SCNC: 140 MMOL/L (ref 136–145)
SPECIMEN DESCRIPTION: NORMAL
SPECIMEN DESCRIPTION: NORMAL
TROPONIN I SERPL HS-MCNC: <6 NG/L (ref 0–14)
TROPONIN I SERPL HS-MCNC: <6 NG/L (ref 0–14)
WBC OTHER # BLD: 5.9 K/UL (ref 3.5–11.3)

## 2024-11-03 PROCEDURE — 94664 DEMO&/EVAL PT USE INHALER: CPT

## 2024-11-03 PROCEDURE — 96374 THER/PROPH/DIAG INJ IV PUSH: CPT

## 2024-11-03 PROCEDURE — 6360000002 HC RX W HCPCS: Performed by: STUDENT IN AN ORGANIZED HEALTH CARE EDUCATION/TRAINING PROGRAM

## 2024-11-03 PROCEDURE — 71045 X-RAY EXAM CHEST 1 VIEW: CPT

## 2024-11-03 PROCEDURE — 86140 C-REACTIVE PROTEIN: CPT

## 2024-11-03 PROCEDURE — 94640 AIRWAY INHALATION TREATMENT: CPT

## 2024-11-03 PROCEDURE — 84145 PROCALCITONIN (PCT): CPT

## 2024-11-03 PROCEDURE — 94761 N-INVAS EAR/PLS OXIMETRY MLT: CPT

## 2024-11-03 PROCEDURE — 6370000000 HC RX 637 (ALT 250 FOR IP): Performed by: STUDENT IN AN ORGANIZED HEALTH CARE EDUCATION/TRAINING PROGRAM

## 2024-11-03 PROCEDURE — 2580000003 HC RX 258: Performed by: STUDENT IN AN ORGANIZED HEALTH CARE EDUCATION/TRAINING PROGRAM

## 2024-11-03 PROCEDURE — 2000000000 HC ICU R&B

## 2024-11-03 PROCEDURE — 94660 CPAP INITIATION&MGMT: CPT

## 2024-11-03 PROCEDURE — 2700000000 HC OXYGEN THERAPY PER DAY

## 2024-11-03 PROCEDURE — 5A09457 ASSISTANCE WITH RESPIRATORY VENTILATION, 24-96 CONSECUTIVE HOURS, CONTINUOUS POSITIVE AIRWAY PRESSURE: ICD-10-PCS | Performed by: INTERNAL MEDICINE

## 2024-11-03 PROCEDURE — 96367 TX/PROPH/DG ADDL SEQ IV INF: CPT

## 2024-11-03 PROCEDURE — 96365 THER/PROPH/DIAG IV INF INIT: CPT

## 2024-11-03 PROCEDURE — 83605 ASSAY OF LACTIC ACID: CPT

## 2024-11-03 PROCEDURE — 82805 BLOOD GASES W/O2 SATURATION: CPT

## 2024-11-03 PROCEDURE — 94150 VITAL CAPACITY TEST: CPT

## 2024-11-03 PROCEDURE — 36600 WITHDRAWAL OF ARTERIAL BLOOD: CPT

## 2024-11-03 PROCEDURE — 0202U NFCT DS 22 TRGT SARS-COV-2: CPT

## 2024-11-03 PROCEDURE — 94669 MECHANICAL CHEST WALL OSCILL: CPT

## 2024-11-03 PROCEDURE — 93010 ELECTROCARDIOGRAM REPORT: CPT | Performed by: INTERNAL MEDICINE

## 2024-11-03 RX ORDER — ACETAMINOPHEN 650 MG/1
650 SUPPOSITORY RECTAL EVERY 6 HOURS PRN
Status: DISCONTINUED | OUTPATIENT
Start: 2024-11-03 | End: 2024-11-04 | Stop reason: HOSPADM

## 2024-11-03 RX ORDER — LANOLIN ALCOHOL/MO/W.PET/CERES
1 CREAM (GRAM) TOPICAL DAILY
Status: DISCONTINUED | OUTPATIENT
Start: 2024-11-03 | End: 2024-11-03 | Stop reason: CLARIF

## 2024-11-03 RX ORDER — POTASSIUM CHLORIDE 1500 MG/1
20 TABLET, EXTENDED RELEASE ORAL 2 TIMES DAILY
Status: DISCONTINUED | OUTPATIENT
Start: 2024-11-03 | End: 2024-11-04 | Stop reason: HOSPADM

## 2024-11-03 RX ORDER — DIGOXIN 125 MCG
125 TABLET ORAL DAILY
Status: DISCONTINUED | OUTPATIENT
Start: 2024-11-03 | End: 2024-11-04 | Stop reason: HOSPADM

## 2024-11-03 RX ORDER — GUAIFENESIN 600 MG/1
600 TABLET, EXTENDED RELEASE ORAL 2 TIMES DAILY
Status: DISCONTINUED | OUTPATIENT
Start: 2024-11-03 | End: 2024-11-04 | Stop reason: HOSPADM

## 2024-11-03 RX ORDER — ALBUTEROL SULFATE 0.83 MG/ML
2.5 SOLUTION RESPIRATORY (INHALATION)
Status: DISCONTINUED | OUTPATIENT
Start: 2024-11-03 | End: 2024-11-03

## 2024-11-03 RX ORDER — PREGABALIN 75 MG/1
300 CAPSULE ORAL 2 TIMES DAILY
Status: DISCONTINUED | OUTPATIENT
Start: 2024-11-03 | End: 2024-11-04 | Stop reason: HOSPADM

## 2024-11-03 RX ORDER — IPRATROPIUM BROMIDE AND ALBUTEROL SULFATE 2.5; .5 MG/3ML; MG/3ML
1 SOLUTION RESPIRATORY (INHALATION)
Status: DISCONTINUED | OUTPATIENT
Start: 2024-11-03 | End: 2024-11-04 | Stop reason: HOSPADM

## 2024-11-03 RX ORDER — IPRATROPIUM BROMIDE AND ALBUTEROL SULFATE 2.5; .5 MG/3ML; MG/3ML
1 SOLUTION RESPIRATORY (INHALATION) EVERY 4 HOURS PRN
Status: DISCONTINUED | OUTPATIENT
Start: 2024-11-03 | End: 2024-11-03

## 2024-11-03 RX ORDER — ASCORBIC ACID 500 MG
500 TABLET ORAL 2 TIMES DAILY
Status: DISCONTINUED | OUTPATIENT
Start: 2024-11-03 | End: 2024-11-04 | Stop reason: HOSPADM

## 2024-11-03 RX ORDER — SODIUM CHLORIDE 9 MG/ML
INJECTION, SOLUTION INTRAVENOUS PRN
Status: DISCONTINUED | OUTPATIENT
Start: 2024-11-03 | End: 2024-11-04 | Stop reason: HOSPADM

## 2024-11-03 RX ORDER — ONDANSETRON 2 MG/ML
4 INJECTION INTRAMUSCULAR; INTRAVENOUS EVERY 6 HOURS PRN
Status: DISCONTINUED | OUTPATIENT
Start: 2024-11-03 | End: 2024-11-04 | Stop reason: HOSPADM

## 2024-11-03 RX ORDER — DULOXETIN HYDROCHLORIDE 60 MG/1
60 CAPSULE, DELAYED RELEASE ORAL DAILY
Status: DISCONTINUED | OUTPATIENT
Start: 2024-11-03 | End: 2024-11-04 | Stop reason: HOSPADM

## 2024-11-03 RX ORDER — BUMETANIDE 1 MG/1
2 TABLET ORAL DAILY
Status: DISCONTINUED | OUTPATIENT
Start: 2024-11-03 | End: 2024-11-04 | Stop reason: HOSPADM

## 2024-11-03 RX ORDER — ONDANSETRON 4 MG/1
4 TABLET, ORALLY DISINTEGRATING ORAL EVERY 8 HOURS PRN
Status: DISCONTINUED | OUTPATIENT
Start: 2024-11-03 | End: 2024-11-04 | Stop reason: HOSPADM

## 2024-11-03 RX ORDER — DILTIAZEM HYDROCHLORIDE 120 MG/1
120 CAPSULE, COATED, EXTENDED RELEASE ORAL DAILY
Status: DISCONTINUED | OUTPATIENT
Start: 2024-11-03 | End: 2024-11-04 | Stop reason: HOSPADM

## 2024-11-03 RX ORDER — CITALOPRAM HYDROBROMIDE 20 MG/1
40 TABLET ORAL DAILY
Status: DISCONTINUED | OUTPATIENT
Start: 2024-11-03 | End: 2024-11-04 | Stop reason: HOSPADM

## 2024-11-03 RX ORDER — BENZONATATE 100 MG/1
100 CAPSULE ORAL 3 TIMES DAILY PRN
Status: DISCONTINUED | OUTPATIENT
Start: 2024-11-03 | End: 2024-11-04 | Stop reason: HOSPADM

## 2024-11-03 RX ORDER — M-VIT,TX,IRON,MINS/CALC/FOLIC 27MG-0.4MG
1 TABLET ORAL DAILY
Status: DISCONTINUED | OUTPATIENT
Start: 2024-11-03 | End: 2024-11-04 | Stop reason: HOSPADM

## 2024-11-03 RX ORDER — POLYETHYLENE GLYCOL 3350 17 G/17G
17 POWDER, FOR SOLUTION ORAL DAILY PRN
Status: DISCONTINUED | OUTPATIENT
Start: 2024-11-03 | End: 2024-11-04 | Stop reason: HOSPADM

## 2024-11-03 RX ORDER — LEVOTHYROXINE SODIUM 100 UG/1
200 TABLET ORAL DAILY
Status: DISCONTINUED | OUTPATIENT
Start: 2024-11-03 | End: 2024-11-04 | Stop reason: HOSPADM

## 2024-11-03 RX ORDER — SODIUM CHLORIDE 0.9 % (FLUSH) 0.9 %
5-40 SYRINGE (ML) INJECTION PRN
Status: DISCONTINUED | OUTPATIENT
Start: 2024-11-03 | End: 2024-11-04 | Stop reason: HOSPADM

## 2024-11-03 RX ORDER — IPRATROPIUM BROMIDE AND ALBUTEROL SULFATE 2.5; .5 MG/3ML; MG/3ML
1 SOLUTION RESPIRATORY (INHALATION) ONCE
Status: COMPLETED | OUTPATIENT
Start: 2024-11-03 | End: 2024-11-03

## 2024-11-03 RX ORDER — ALBUTEROL SULFATE 0.83 MG/ML
2.5 SOLUTION RESPIRATORY (INHALATION) EVERY 4 HOURS PRN
Status: DISCONTINUED | OUTPATIENT
Start: 2024-11-03 | End: 2024-11-04 | Stop reason: HOSPADM

## 2024-11-03 RX ORDER — SODIUM CHLORIDE 9 MG/ML
INJECTION, SOLUTION INTRAVENOUS CONTINUOUS
Status: DISCONTINUED | OUTPATIENT
Start: 2024-11-03 | End: 2024-11-03

## 2024-11-03 RX ORDER — VANCOMYCIN 1 G/200ML
15 INJECTION, SOLUTION INTRAVENOUS ONCE
Status: COMPLETED | OUTPATIENT
Start: 2024-11-03 | End: 2024-11-03

## 2024-11-03 RX ORDER — GUAIFENESIN/DEXTROMETHORPHAN 100-10MG/5
5 SYRUP ORAL EVERY 4 HOURS PRN
Status: DISCONTINUED | OUTPATIENT
Start: 2024-11-03 | End: 2024-11-04 | Stop reason: HOSPADM

## 2024-11-03 RX ORDER — ACETAMINOPHEN 325 MG/1
650 TABLET ORAL EVERY 6 HOURS PRN
Status: DISCONTINUED | OUTPATIENT
Start: 2024-11-03 | End: 2024-11-04 | Stop reason: HOSPADM

## 2024-11-03 RX ORDER — TRAZODONE HYDROCHLORIDE 50 MG/1
200 TABLET, FILM COATED ORAL NIGHTLY PRN
Status: DISCONTINUED | OUTPATIENT
Start: 2024-11-03 | End: 2024-11-04 | Stop reason: HOSPADM

## 2024-11-03 RX ORDER — ASPIRIN 81 MG/1
81 TABLET ORAL DAILY
Status: DISCONTINUED | OUTPATIENT
Start: 2024-11-03 | End: 2024-11-04 | Stop reason: HOSPADM

## 2024-11-03 RX ORDER — SODIUM CHLORIDE 0.9 % (FLUSH) 0.9 %
5-40 SYRINGE (ML) INJECTION EVERY 12 HOURS SCHEDULED
Status: DISCONTINUED | OUTPATIENT
Start: 2024-11-03 | End: 2024-11-04 | Stop reason: HOSPADM

## 2024-11-03 RX ADMIN — CEFEPIME 2000 MG: 2 INJECTION, POWDER, FOR SOLUTION INTRAVENOUS at 08:42

## 2024-11-03 RX ADMIN — SODIUM CHLORIDE, PRESERVATIVE FREE 10 ML: 5 INJECTION INTRAVENOUS at 20:58

## 2024-11-03 RX ADMIN — POTASSIUM CHLORIDE 20 MEQ: 1500 TABLET, EXTENDED RELEASE ORAL at 09:18

## 2024-11-03 RX ADMIN — DIGOXIN 125 MCG: 125 TABLET ORAL at 09:18

## 2024-11-03 RX ADMIN — CEFEPIME 2000 MG: 2 INJECTION, POWDER, FOR SOLUTION INTRAVENOUS at 16:47

## 2024-11-03 RX ADMIN — SODIUM CHLORIDE, PRESERVATIVE FREE 10 ML: 5 INJECTION INTRAVENOUS at 08:42

## 2024-11-03 RX ADMIN — METHYLPREDNISOLONE SODIUM SUCCINATE 125 MG: 125 INJECTION INTRAMUSCULAR; INTRAVENOUS at 00:32

## 2024-11-03 RX ADMIN — RIVAROXABAN 20 MG: 20 TABLET, FILM COATED ORAL at 09:18

## 2024-11-03 RX ADMIN — IPRATROPIUM BROMIDE AND ALBUTEROL SULFATE 1 DOSE: 2.5; .5 SOLUTION RESPIRATORY (INHALATION) at 00:05

## 2024-11-03 RX ADMIN — BUMETANIDE 2 MG: 1 TABLET ORAL at 09:18

## 2024-11-03 RX ADMIN — VANCOMYCIN 1000 MG: 1 INJECTION, SOLUTION INTRAVENOUS at 02:06

## 2024-11-03 RX ADMIN — TRAZODONE HYDROCHLORIDE 200 MG: 50 TABLET ORAL at 23:02

## 2024-11-03 RX ADMIN — POTASSIUM CHLORIDE 20 MEQ: 1500 TABLET, EXTENDED RELEASE ORAL at 20:58

## 2024-11-03 RX ADMIN — IPRATROPIUM BROMIDE AND ALBUTEROL SULFATE 1 DOSE: 2.5; .5 SOLUTION RESPIRATORY (INHALATION) at 20:35

## 2024-11-03 RX ADMIN — DILTIAZEM HYDROCHLORIDE 120 MG: 120 CAPSULE, COATED, EXTENDED RELEASE ORAL at 09:18

## 2024-11-03 RX ADMIN — PIPERACILLIN AND TAZOBACTAM 3375 MG: 3; .375 INJECTION, POWDER, LYOPHILIZED, FOR SOLUTION INTRAVENOUS at 01:31

## 2024-11-03 RX ADMIN — DULOXETINE HYDROCHLORIDE 60 MG: 60 CAPSULE, DELAYED RELEASE ORAL at 09:18

## 2024-11-03 RX ADMIN — CITALOPRAM HYDROBROMIDE 40 MG: 20 TABLET ORAL at 09:18

## 2024-11-03 RX ADMIN — IPRATROPIUM BROMIDE AND ALBUTEROL SULFATE 1 DOSE: 2.5; .5 SOLUTION RESPIRATORY (INHALATION) at 10:35

## 2024-11-03 RX ADMIN — PREGABALIN 300 MG: 75 CAPSULE ORAL at 09:17

## 2024-11-03 RX ADMIN — IPRATROPIUM BROMIDE AND ALBUTEROL SULFATE 1 DOSE: 2.5; .5 SOLUTION RESPIRATORY (INHALATION) at 15:30

## 2024-11-03 RX ADMIN — ASPIRIN 81 MG: 81 TABLET, COATED ORAL at 09:18

## 2024-11-03 RX ADMIN — PREGABALIN 300 MG: 75 CAPSULE ORAL at 20:57

## 2024-11-03 RX ADMIN — GUAIFENESIN 600 MG: 600 TABLET, EXTENDED RELEASE ORAL at 09:18

## 2024-11-03 RX ADMIN — OXYCODONE HYDROCHLORIDE AND ACETAMINOPHEN 500 MG: 500 TABLET ORAL at 20:58

## 2024-11-03 RX ADMIN — IPRATROPIUM BROMIDE AND ALBUTEROL SULFATE 1 DOSE: 2.5; .5 SOLUTION RESPIRATORY (INHALATION) at 00:59

## 2024-11-03 RX ADMIN — GUAIFENESIN 600 MG: 600 TABLET, EXTENDED RELEASE ORAL at 20:57

## 2024-11-03 NOTE — ED PROVIDER NOTES
heart sounds. No murmur heard.  Pulmonary:      Effort: Tachypnea and respiratory distress present.      Breath sounds: Wheezing present.      Comments: Crackles bilateral lower bases  Chest:      Chest wall: No tenderness.   Abdominal:      General: There is no distension.      Palpations: Abdomen is soft.      Tenderness: There is no abdominal tenderness. There is no guarding or rebound.   Musculoskeletal:         General: Normal range of motion.      Cervical back: Normal range of motion.      Right lower leg: No edema.      Left lower leg: No edema.   Neurological:      General: No focal deficit present.      Mental Status: She is alert and oriented to person, place, and time.         DIAGNOSTIC RESULTS     EKG: Sinus tachycardia at 105 bpm.  Normal axis.  Normal intervals.  QTc 422 ms.  Nonspecific ST segment and T wave changes.    RADIOLOGY:     Interpretation per the Radiologist below, if available at the time of this note:    XR CHEST PORTABLE   Final Result   Patchy areas of airspace opacity are present nearly diffusely throughout both   lungs, left worse than right. Findings are similar to previous however the   previously seen small to moderate right pleural effusion has resolved.   Findings consistent with residual or recurrent pneumonia.               LABS:  Labs Reviewed   CBC WITH AUTO DIFFERENTIAL - Abnormal; Notable for the following components:       Result Value    RBC 3.92 (*)     All other components within normal limits   COMPREHENSIVE METABOLIC PANEL - Abnormal; Notable for the following components:    CO2 32 (*)     Glucose 117 (*)     ALT 9 (*)     All other components within normal limits   BLOOD GAS, VENOUS - Abnormal; Notable for the following components:    pH, Micheal 7.441 (*)     PO2, Micheal 183.2 (*)     Positive Base Excess, Micheal 3.7 (*)     O2 Sat, Micheal 99.3 (*)     pH, Micheal, Temp Adj 7.441 (*)     pO2, Micheal, Temp Adj 183.2 (*)     All other components within normal limits   BLOOD GAS,

## 2024-11-03 NOTE — RT PROTOCOL NOTE
RESPIRATORY ASSESSMENT PROTOCOL                                                                                              Patient Name: Toshia South Room#: I304/I304-01 : 1945     Admitting diagnosis: Septicemia (HCC) [A41.9]  Recurrent pneumonia [J18.9]  Acute respiratory failure with hypoxia [J96.01]  Acute on chronic respiratory failure [J96.20]       Medical History:   Past Medical History:   Diagnosis Date    A-fib (HCC)     Anxiety     Atrial fibrillation (HCC)     Biventricular congestive heart failure (HCC)     Bronchiectasis with acute exacerbation (HCC) 2022    CAD (coronary artery disease)     Chronic pain syndrome     dilaudid infusion pump    COPD (chronic obstructive pulmonary disease) (HCC)     Depression     GERD (gastroesophageal reflux disease)     Hypothyroidism     Impaired fasting glucose     Iron deficiency anemia     Osteoporosis     Pulmonary fibrosis (HCC) 2022    Subdural hematoma 2022       PATIENT ASSESSMENT    LABORATORY DATA  Hematology:   Lab Results   Component Value Date/Time    WBC 5.9 2024 11:58 PM    RBC 3.92 2024 11:58 PM    HGB 12.1 2024 11:58 PM    HCT 37.9 2024 11:58 PM     2024 11:58 PM     Chemistry:    Lab Results   Component Value Date/Time    PHART 7.341 2024 02:38 AM    DFY7SEL 59.2 2024 02:38 AM    PO2ART 96.8 2024 02:38 AM    V0ZIIJCZ 96.9 2024 02:38 AM    BBT4RUF 31.3 2024 02:38 AM    PBEA 3.8 2024 02:38 AM    NBEA 1.2 2024 06:22 PM       VITALS  Pulse: 84   Respirations: 14  BP: (!) 145/65  SpO2: 92 % O2 Device: PAP (positive airway pressure)  Temp: 97.8 °F (36.6 °C)    SKIN COLOR  [x] Normal  [] Pale  [] Dusky  [] Cyanotic    RESPIRATORY PATTERN  [] Normal  [x] Dyspnea  [] Cheyne-Granger  [] Kussmaul  [] Biots    AMBULATORY  [] Yes  [x] No  [] With Assistance      Patient Acuity 0 1 2 3 4 Score   Level of Consciousness (LOC) [x]  Alert & Oriented or Pt

## 2024-11-03 NOTE — H&P
[address] , [place], [place], [address]    History & Physical Examination Note              Date:   11/3/2024  Patient name:  Toshia South  Date of admission:  11/2/2024 11:43 PM  MRN:   [id number]  YOB: 1945    CHIEF COMPLAINT:       Chief Complaint   Patient presents with    Shortness of Breath     From [facility], ECF states that she was low 90's on her normal 4L of O2. EMS states that she was mid 80's, they gave 1 duoneb which improved her sats. History of COPD.        History Obtained From:  Patient and chart review.    HPI:     The patient is a 79 y.o.  female who presented with concerns for worsening shortness of breath. She has a known prior history of COPD and is on supplemental O2 at her current residency/SNF. She was noted to have hypoxia while at [facility] - in the 90s at the time. She was not doing well on 4L via NC at the time. EMS had been contacted and she was saturating in the 80s at the time. She was provided with breathing treatments which were helpful. She had PNA about 1 month prior or so. In the ED, labs and imaging were obtained and were reviewed. The case was discussed with the ED Provider prior to admission. She was placed on BIPAP and started empirically on IV Abx at the time.     PAST MEDICAL HISTORY:        has a past medical history of A-fib (HCC), Anxiety, Atrial fibrillation (HCC), Biventricular congestive heart failure (HCC), Bronchiectasis with acute exacerbation (HCC), CAD (coronary artery disease), Chronic pain syndrome, COPD (chronic obstructive pulmonary disease) (HCC), Depression, GERD (gastroesophageal reflux disease), Hypothyroidism, Impaired fasting glucose, Iron deficiency anemia, Osteoporosis, Pulmonary fibrosis (HCC), and Subdural hematoma.      Diagnosis Date    A-fib (HCC)     Anxiety     Atrial fibrillation (HCC)     Biventricular congestive heart failure (HCC)     Bronchiectasis with acute exacerbation (HCC)

## 2024-11-04 VITALS
TEMPERATURE: 97.3 F | RESPIRATION RATE: 17 BRPM | BODY MASS INDEX: 23.54 KG/M2 | HEART RATE: 97 BPM | WEIGHT: 141.31 LBS | OXYGEN SATURATION: 97 % | HEIGHT: 65 IN | SYSTOLIC BLOOD PRESSURE: 117 MMHG | DIASTOLIC BLOOD PRESSURE: 52 MMHG

## 2024-11-04 LAB
ALBUMIN SERPL-MCNC: 3.2 G/DL (ref 3.5–5.2)
ALBUMIN/GLOB SERPL: 1 {RATIO} (ref 1–2.5)
ALP SERPL-CCNC: 68 U/L (ref 35–104)
ALT SERPL-CCNC: 5 U/L (ref 10–35)
ANION GAP SERPL CALCULATED.3IONS-SCNC: 6 MMOL/L (ref 9–16)
ARTERIAL PATENCY WRIST A: YES
AST SERPL-CCNC: 13 U/L (ref 10–35)
BASOPHILS # BLD: <0.03 K/UL (ref 0–0.2)
BASOPHILS NFR BLD: 0 % (ref 0–2)
BDY SITE: ABNORMAL
BILIRUB SERPL-MCNC: 0.3 MG/DL (ref 0–1.2)
BODY TEMPERATURE: 37
BUN SERPL-MCNC: 11 MG/DL (ref 8–23)
BUN/CREAT SERPL: 28 (ref 9–20)
CALCIUM SERPL-MCNC: 9.1 MG/DL (ref 8.6–10.4)
CHLORIDE SERPL-SCNC: 103 MMOL/L (ref 98–107)
CO2 SERPL-SCNC: 32 MMOL/L (ref 20–31)
CREAT SERPL-MCNC: 0.4 MG/DL (ref 0.5–0.9)
CRP SERPL HS-MCNC: 41 MG/L (ref 0–5)
EOSINOPHIL # BLD: <0.03 K/UL (ref 0–0.44)
EOSINOPHILS RELATIVE PERCENT: 0 % (ref 1–4)
ERYTHROCYTE [DISTWIDTH] IN BLOOD BY AUTOMATED COUNT: 13.7 % (ref 11.8–14.4)
FIO2 ON VENT: 28 %
GAS FLOW.O2 O2 DELIVERY SYS: ABNORMAL L/MIN
GFR, ESTIMATED: >90 ML/MIN/1.73M2
GLUCOSE SERPL-MCNC: 104 MG/DL (ref 74–99)
HCO3 ARTERIAL: 32.1 MMOL/L (ref 22–26)
HCT VFR BLD AUTO: 33.1 % (ref 36.3–47.1)
HGB BLD-MCNC: 10.7 G/DL (ref 11.9–15.1)
IMM GRANULOCYTES # BLD AUTO: <0.03 K/UL (ref 0–0.3)
IMM GRANULOCYTES NFR BLD: 0 %
LYMPHOCYTES NFR BLD: 1.08 K/UL (ref 1.1–3.7)
LYMPHOCYTES RELATIVE PERCENT: 16 % (ref 24–43)
MCH RBC QN AUTO: 30.8 PG (ref 25.2–33.5)
MCHC RBC AUTO-ENTMCNC: 32.3 G/DL (ref 28.4–34.8)
MCV RBC AUTO: 95.4 FL (ref 82.6–102.9)
MONOCYTES NFR BLD: 0.63 K/UL (ref 0.1–1.2)
MONOCYTES NFR BLD: 9 % (ref 3–12)
NEUTROPHILS NFR BLD: 75 % (ref 36–65)
NEUTS SEG NFR BLD: 4.97 K/UL (ref 1.5–8.1)
NRBC BLD-RTO: 0 PER 100 WBC
O2 SAT, ARTERIAL: 94 % (ref 95–98)
PCO2 ARTERIAL: 53.7 MMHG (ref 35–45)
PCO2, ART, TEMP ADJ: 53.7 (ref 35–45)
PH ARTERIAL: 7.39 (ref 7.35–7.45)
PH, ART, TEMP ADJ: 7.39 (ref 7.35–7.45)
PLATELET # BLD AUTO: 220 K/UL (ref 138–453)
PMV BLD AUTO: 9.5 FL (ref 8.1–13.5)
PO2 ARTERIAL: 71 MMHG (ref 80–100)
PO2, ART, TEMP ADJ: 71 MMHG (ref 80–100)
POSITIVE BASE EXCESS, ART: 5.7 MMOL/L (ref 0–2)
POTASSIUM SERPL-SCNC: 3.9 MMOL/L (ref 3.7–5.3)
PROCALCITONIN SERPL-MCNC: 0.13 NG/ML (ref 0–0.09)
PROT SERPL-MCNC: 6.5 G/DL (ref 6.6–8.7)
RBC # BLD AUTO: 3.47 M/UL (ref 3.95–5.11)
SODIUM SERPL-SCNC: 141 MMOL/L (ref 136–145)
WBC OTHER # BLD: 6.7 K/UL (ref 3.5–11.3)

## 2024-11-04 PROCEDURE — 94761 N-INVAS EAR/PLS OXIMETRY MLT: CPT

## 2024-11-04 PROCEDURE — 2700000000 HC OXYGEN THERAPY PER DAY

## 2024-11-04 PROCEDURE — 36600 WITHDRAWAL OF ARTERIAL BLOOD: CPT

## 2024-11-04 PROCEDURE — 94640 AIRWAY INHALATION TREATMENT: CPT

## 2024-11-04 PROCEDURE — 2580000003 HC RX 258: Performed by: STUDENT IN AN ORGANIZED HEALTH CARE EDUCATION/TRAINING PROGRAM

## 2024-11-04 PROCEDURE — 80053 COMPREHEN METABOLIC PANEL: CPT

## 2024-11-04 PROCEDURE — 94669 MECHANICAL CHEST WALL OSCILL: CPT

## 2024-11-04 PROCEDURE — 97166 OT EVAL MOD COMPLEX 45 MIN: CPT

## 2024-11-04 PROCEDURE — 36415 COLL VENOUS BLD VENIPUNCTURE: CPT

## 2024-11-04 PROCEDURE — 86140 C-REACTIVE PROTEIN: CPT

## 2024-11-04 PROCEDURE — 6360000002 HC RX W HCPCS: Performed by: STUDENT IN AN ORGANIZED HEALTH CARE EDUCATION/TRAINING PROGRAM

## 2024-11-04 PROCEDURE — 6370000000 HC RX 637 (ALT 250 FOR IP): Performed by: STUDENT IN AN ORGANIZED HEALTH CARE EDUCATION/TRAINING PROGRAM

## 2024-11-04 PROCEDURE — 84145 PROCALCITONIN (PCT): CPT

## 2024-11-04 PROCEDURE — 82805 BLOOD GASES W/O2 SATURATION: CPT

## 2024-11-04 PROCEDURE — 94660 CPAP INITIATION&MGMT: CPT

## 2024-11-04 PROCEDURE — 85025 COMPLETE CBC W/AUTO DIFF WBC: CPT

## 2024-11-04 RX ORDER — BENZONATATE 100 MG/1
200 CAPSULE ORAL 3 TIMES DAILY PRN
DISCHARGE
Start: 2024-11-04

## 2024-11-04 RX ADMIN — BUMETANIDE 2 MG: 1 TABLET ORAL at 08:05

## 2024-11-04 RX ADMIN — CALCIUM CARBONATE-VITAMIN D TAB 500 MG-200 UNIT 1 TABLET: 500-200 TAB at 08:06

## 2024-11-04 RX ADMIN — GUAIFENESIN 600 MG: 600 TABLET, EXTENDED RELEASE ORAL at 08:05

## 2024-11-04 RX ADMIN — PREGABALIN 300 MG: 75 CAPSULE ORAL at 08:05

## 2024-11-04 RX ADMIN — CITALOPRAM HYDROBROMIDE 40 MG: 20 TABLET ORAL at 08:05

## 2024-11-04 RX ADMIN — CEFEPIME 2000 MG: 2 INJECTION, POWDER, FOR SOLUTION INTRAVENOUS at 03:57

## 2024-11-04 RX ADMIN — RIVAROXABAN 20 MG: 20 TABLET, FILM COATED ORAL at 08:06

## 2024-11-04 RX ADMIN — Medication 1 TABLET: at 08:05

## 2024-11-04 RX ADMIN — ASPIRIN 81 MG: 81 TABLET, COATED ORAL at 08:05

## 2024-11-04 RX ADMIN — OXYCODONE HYDROCHLORIDE AND ACETAMINOPHEN 500 MG: 500 TABLET ORAL at 08:05

## 2024-11-04 RX ADMIN — POTASSIUM CHLORIDE 20 MEQ: 1500 TABLET, EXTENDED RELEASE ORAL at 08:05

## 2024-11-04 RX ADMIN — IPRATROPIUM BROMIDE AND ALBUTEROL SULFATE 1 DOSE: 2.5; .5 SOLUTION RESPIRATORY (INHALATION) at 05:59

## 2024-11-04 RX ADMIN — LEVOTHYROXINE SODIUM 200 MCG: 100 TABLET ORAL at 06:48

## 2024-11-04 RX ADMIN — DIGOXIN 125 MCG: 125 TABLET ORAL at 08:05

## 2024-11-04 RX ADMIN — SODIUM CHLORIDE, PRESERVATIVE FREE 10 ML: 5 INJECTION INTRAVENOUS at 08:20

## 2024-11-04 RX ADMIN — DILTIAZEM HYDROCHLORIDE 120 MG: 120 CAPSULE, COATED, EXTENDED RELEASE ORAL at 08:05

## 2024-11-04 RX ADMIN — DULOXETINE HYDROCHLORIDE 60 MG: 60 CAPSULE, DELAYED RELEASE ORAL at 08:06

## 2024-11-04 NOTE — PLAN OF CARE
Problem: Chronic Conditions and Co-morbidities  Goal: Patient's chronic conditions and co-morbidity symptoms are monitored and maintained or improved  11/4/2024 0746 by Chanel Karimi RN  Outcome: Completed  11/4/2024 0303 by Alina Aldrich RN  Outcome: Progressing     Problem: Discharge Planning  Goal: Discharge to home or other facility with appropriate resources  11/4/2024 0746 by Chanel Karimi RN  Outcome: Completed  Flowsheets (Taken 11/4/2024 0500 by Alina Aldrich RN)  Discharge to home or other facility with appropriate resources: Identify barriers to discharge with patient and caregiver  11/4/2024 0303 by Alina Aldrich RN  Outcome: Progressing  Flowsheets  Taken 11/3/2024 2305  Discharge to home or other facility with appropriate resources: Identify barriers to discharge with patient and caregiver  Taken 11/3/2024 1900  Discharge to home or other facility with appropriate resources: Identify barriers to discharge with patient and caregiver     Problem: Safety - Adult  Goal: Free from fall injury  11/4/2024 0746 by Chanel Karimi RN  Outcome: Completed  11/4/2024 0303 by Alina Aldrich RN  Outcome: Progressing  Note: Call light and bedside table with in reach. Bed and chair wheels locked at all times, with bed in lowest position. Frequent checks and needs meet in appropriate timing.      Problem: Nutrition Deficit:  Goal: Optimize nutritional status  11/4/2024 0746 by Chanel Karimi RN  Outcome: Completed  11/4/2024 0650 by Jose Eduardo Conn, RD, LD  Outcome: Progressing  Flowsheets (Taken 11/4/2024 0650)  Nutrient intake appropriate for improving, restoring, or maintaining nutritional needs:   Monitor oral intake, labs, and treatment plans   Recommend appropriate diets, oral nutritional supplements, and vitamin/mineral supplements  Note: Nutrition Problem #1: Moderate malnutrition  Intervention: Food and/or Nutrient Delivery: Continue Current Diet

## 2024-11-04 NOTE — PLAN OF CARE
Problem: Chronic Conditions and Co-morbidities  Goal: Patient's chronic conditions and co-morbidity symptoms are monitored and maintained or improved  Outcome: Progressing     Problem: Discharge Planning  Goal: Discharge to home or other facility with appropriate resources  Outcome: Progressing  Flowsheets  Taken 11/3/2024 2305  Discharge to home or other facility with appropriate resources: Identify barriers to discharge with patient and caregiver  Taken 11/3/2024 1900  Discharge to home or other facility with appropriate resources: Identify barriers to discharge with patient and caregiver     Problem: Safety - Adult  Goal: Free from fall injury  Outcome: Progressing  Note: Call light and bedside table with in reach. Bed and chair wheels locked at all times, with bed in lowest position. Frequent checks and needs meet in appropriate timing.

## 2024-11-04 NOTE — ACP (ADVANCE CARE PLANNING)
Advance Care Planning     Palliative Team Advance Care Planning (ACP) Conversation    Date of Conversation: 11/04/24    Individuals present for the conversation: Patient with decision making capacity     ACP documents on file prior to discussion:  -None    Previously completed document/s not on file: Patient / participant reports that there are no previously executed ACP documents.    Healthcare Decision Maker:    Primary Decision Maker (Active): Jerrell South - Spouse - [phone]    Secondary Decision Maker: Lucio South - Child - [phone]    Supplemental (Other) Decision Maker: BrannonJose Eduardo - Child - [phone]     Conversation Summary: Sary has no ACP documents completed at this time. She is currently a full code and wishes to remain so.     Resuscitation Status:   Code Status: Full Code     Documentation Completed:  -No new documents completed.    I spent 10 minutes with the patient and/or surrogate decision maker discussing the patient's wishes and goals.      Madhavi Diaz RN

## 2024-11-04 NOTE — CONSULTS
Palliative Care Inpatient    Patient: Toshia South  Room: I304/I304-01    Reason For Consult   Goals of care evaluation  Distress management  Guidance and support  Facilitate communications  Assistance in coordinating care    Code Status: Full Code      Impression: Toshia South is a 79 y.o. year old female  has a past medical history of A-fib (HCC), Anxiety, Atrial fibrillation (HCC), Biventricular congestive heart failure (HCC), Bronchiectasis with acute exacerbation (HCC), CAD (coronary artery disease), Chronic pain syndrome, COPD (chronic obstructive pulmonary disease) (HCC), Depression, GERD (gastroesophageal reflux disease), Hypothyroidism, Impaired fasting glucose, Iron deficiency anemia, Osteoporosis, Pulmonary fibrosis (HCC), and Subdural hematoma..  Currently hospitalized for the management of respiratory failure.  The Palliative Care Team is following to assist with ACP/High readmission score.     Vital Signs  BP (!) 143/107   Pulse 84   Temp 97.3 °F (36.3 °C) (Temporal)   Resp 16   Ht 1.651 m (5' 5\")   Wt 64.1 kg (141 lb 5 oz)   SpO2 97%   BMI 23.52 kg/m²     Patient Active Problem List   Diagnosis    Hypothyroidism    Major depressive disorder    Chronic midline low back pain without sciatica    Iron deficiency anemia    COPD (chronic obstructive pulmonary disease) (HCC)    Biventricular congestive heart failure (HCC)    Anemia    PAF (paroxysmal atrial fibrillation) (HCC)    IPF (idiopathic pulmonary fibrosis) (HCC)    History of subdural hematoma    Fall as cause of accidental injury in home as place of occurrence, initial encounter    GERD (gastroesophageal reflux disease)    Oxygen dependent    Acute on chronic respiratory failure with hypoxia    Community acquired bacterial pneumonia    COPD exacerbation (HCC)    Biventricular heart failure (HCC)    Citrobacter infection    Dysphagia, oropharyngeal    Zenker diverticulum    Acute on chronic respiratory failure with hypoxia and

## 2024-11-04 NOTE — CARE COORDINATION
Patient discharge back to the [facility] today.  Discharge paper work fax to the [facility].  The  is providing the transportation.  BUZZ Torres

## 2024-11-04 NOTE — DISCHARGE SUMMARY
Jong Moraes M.D.  Internal Medicine Discharge Summary    Patient ID:  Toshia South  [id number]  1945    Admission date: 11/2/2024    Discharge date: 11/4/2024     Admitting Physician: Jong Moraes MD     Primary Care Physician: Jong Moraes MD     Primary Discharge Diagnoses:   Principal Problem:    Acute on chronic respiratory failure  Active Problems:    IPF (idiopathic pulmonary fibrosis) (HCC)    Community acquired bacterial pneumonia    COPD (chronic obstructive pulmonary disease) (HCC)    PAF (paroxysmal atrial fibrillation) (HCC)    GERD (gastroesophageal reflux disease)    Oxygen dependent    Secondary hypercoagulable state (HCC)    History of subdural hematoma    Hypothyroidism    Major depressive disorder    Chronic midline low back pain without sciatica    Iron deficiency anemia    Biventricular congestive heart failure (HCC)    Anemia    COPD exacerbation (HCC)    Biventricular heart failure (HCC)    Dysphagia, oropharyngeal    Zenker diverticulum    Chronic pain    Malnutrition of mild degree (HCC)    Multifocal pneumonia    Moderate malnutrition (HCC)  Resolved Problems:    * No resolved hospital problems. *       Additional Diagnoses:       Diagnosis Date    A-fib (HCC)     Anxiety     Atrial fibrillation (HCC)     Biventricular congestive heart failure (HCC)     Bronchiectasis with acute exacerbation (HCC) 04/29/2022    CAD (coronary artery disease)     Chronic pain syndrome     dilaudid infusion pump    COPD (chronic obstructive pulmonary disease) (HCC)     Depression     GERD (gastroesophageal reflux disease)     Hypothyroidism     Impaired fasting glucose     Iron deficiency anemia     Osteoporosis     Pulmonary fibrosis (HCC) 04/29/2022    Subdural hematoma 08/23/2022        Hospital Course:   Toshia South is a 79 y.o. female who was admitted for Acute on chronic respiratory failure.    Ms. South was treated with BiPAP and supplemental O2 and is clinically

## 2024-11-04 NOTE — DISCHARGE INSTR - DIET

## 2024-11-04 NOTE — PROGRESS NOTES
Samantha notified of DC order. STEPHANIE competed. Patient awaiting DC.  
BIPAP taken off per patient request.  at bedside. Water given and pills given in applesauce. Patient coughing up thick yellow brown sputum. Nasal cannula applied at 2 liters. Pulse oximetry 95%.   
[facility]  Inpatient/Observation/Outpatient Rehabilitation    Date: 11/3/2024  Patient Name: Toshia South       [] Inpatient Acute/Observation       []  Outpatient  : 1945       Plan of Care/Recert ends    [] Pt no showed for scheduled appointment    [] As a reminder, pt was contacted/attempted contact via phone of upcoming appointments.    [] Pt refused/declined therapy at this time due to:           [] Pt cancelled due to:  [] No Reason Given   [] Sick/ill   [] Other:      [x] Evaluation held by RN/Provider due to:    [] High Heart Rate   [] High Blood Pressure   [] Orthopedic Consult   [] Hgb < 7   [x] Other:per nurse hold eval, not appropriate at this time     [] Pt ordered brace per physician request:  [] Proper fit will be completed and education for wearing/skin checks    [] Pt does not require skilled services due to:      Therapist/Assistant will attempt to see this patient, at our earliest opportunity.       Annmarie Nguyen, PT Date: 11/3/2024   
Comprehensive Nutrition Assessment    Type and Reason for Visit:  Initial    Nutrition Recommendations/Plan:   Encourage compliance with thickened fluids.      Malnutrition Assessment:  Malnutrition Status:  Moderate malnutrition (11/04/24 0641)    Context:  Acute Illness     Findings of the 6 clinical characteristics of malnutrition:  Energy Intake:  No decrease in energy intake  Weight Loss:  No weight loss     Body Fat Loss:  Mild body fat loss Orbital   Muscle Mass Loss:  Mild muscle mass loss Clavicles (pectoralis & deltoids)  Fluid Accumulation:  Mild Extremities   Strength:  Not Performed    Nutrition Assessment:    Moderate malnutrition (more chronic than acute) r/t inadequate nutrient intakes, AEB mild (residual) fat and muscle losses. Reports better PO intakes and relative compliance with thickened fluids (doesn't thicken water). Some CO2 retention per pCO2.  History of wounds which are reportedly healed (buttock/coccyx). Pending sleep study for bipap approval. Encouraged working with SLP RE: thickness of Boost she uses sometimes and discouraged water use (non-thickened) unless approved by SLP. Lower creatinine which may be reflective of poorer protein intakes and or catabolism (states taking protein foods well).    Nutrition Related Findings:    trace BLE edema. soft bm. Wound Type:  (history buttock/coccyx wound.)       Current Nutrition Intake & Therapies:    Average Meal Intake: 51-75%  Average Supplements Intake: None Ordered  ADULT DIET; Regular; Low Sodium (2 gm); Mildly Thick (Worth)    Anthropometric Measures:  Height: 165.1 cm (5' 5\")  Ideal Body Weight (IBW): 125 lbs (57 kg)    Admission Body Weight: 59 kg (130 lb)  Current Body Weight: 64.1 kg (141 lb 5 oz), 113.1 % IBW. Weight Source: Bed scale  Current BMI (kg/m2): 23.5  Usual Body Weight: 60.3 kg (133 lb) (low weights in October)     % Weight Change (Calculated): 6.3  Weight Adjustment For: No Adjustment                 BMI Categories: 
Dr. Anderson at bedside.   
[facility]  Inpatient/Observation/Outpatient Rehabilitation    Date: 11/3/2024  Patient Name: Toshia South       [x] Inpatient Acute/Observation       []  Outpatient  : 1945       [] Pt no showed for scheduled appointment    [] As a reminder, pt was contacted/attempted contact via phone of upcoming appointments.    [] Pt refused/declined therapy at this time due to:           [] Pt cancelled due to:  [] No Reason Given   [] Sick/ill   [x] Other:  RN requested to hold OT evaluation at this time d/t pt status. Not appropriate for evaluation at this time.    [] Evaluation held by RN/Provider due to:    [] High Heart Rate   [] High Blood Pressure   [] Orthopedic Consult   [] Hgb < 7   [] Other:    [] Pt ordered brace per physician request:  [] Proper fit will be completed and education for wearing/skin checks    [] Pt does not require skilled services due to:      Therapist/Assistant will attempt to see this patient, at our earliest opportunity.       Sarahi Anderson, OT Date: 11/3/2024    
Occupational Therapy  Facility/Department: [facility] ICU  Occupational Therapy Initial Assessment    Name: Toshia South  : 1945  MRN: [id number]  Date of Service: 2024    Discharge Recommendations:  Continue to assess pending progress, Subacute/Skilled Nursing Facility          Patient Diagnosis(es): The primary encounter diagnosis was Recurrent pneumonia. Diagnoses of Septicemia (HCC) and Acute respiratory failure with hypoxia were also pertinent to this visit.  Past Medical History:  has a past medical history of A-fib (HCC), Anxiety, Atrial fibrillation (HCC), Biventricular congestive heart failure (HCC), Bronchiectasis with acute exacerbation (HCC), CAD (coronary artery disease), Chronic pain syndrome, COPD (chronic obstructive pulmonary disease) (HCC), Depression, GERD (gastroesophageal reflux disease), Hypothyroidism, Impaired fasting glucose, Iron deficiency anemia, Osteoporosis, Pulmonary fibrosis (HCC), and Subdural hematoma.  Past Surgical History:  has a past surgical history that includes Hysterectomy (1991); Carpal tunnel release (Right, 1999); Total knee arthroplasty (Right, 2021); fracture surgery (Left, 2018); Wrist fracture surgery (Left, 2018); lumbar discectomy (1993); Lumbar disc surgery (02/15/1994); back surgery (1998); back surgery (1999); Carpal tunnel release (Left, 1999); Neck surgery (2002); Carpal tunnel release (Right, 2002); Carpal tunnel release (Left, 2003); back surgery (10/23/2003); back surgery (10/23/2003); Cervical disc surgery (10/25/2004); Cervical disc surgery (2004); Neck surgery (2005); Toe amputation (10/08/2006); Toe amputation (2008); lumbar laminectomy (2008); Toe amputation (10/03/2008); Humerus fracture surgery (Right, 10/03/2010); Knee arthroscopy (Right, 2011); back surgery (2017); knee surgery (Right, 2016); Wrist fracture surgery (Left, 
Patient admitted to  from ER for acute on chronic respiratory failure. Patient drowsy upon arrival but opens eyes to voice and answers questions. Alert and oriented x4. Patient states she has not been feeling the best the last couple days at the [place]. Lung sounds diminished with some crackles at base. Respirations labored with exertion to commode. Gait unsteady and weak. Pulse oximetry 95% at rest on 4 liters which she wears chronically. Drink of water given and BIPAP placed on patient while sleeping in bed. Will continue to monitor. Bed alarm in place.   
Patient resting on BIPAP. Pulse oximetry 92%. Respirations even and unlabored at rest.   
Patient taken off BIPAP for lunch. Patient states she needs discharged on Tuesday for sleep study. Will pass along to night shift and told patient to make sure she tells Dr. Moraes when he rounds tomorrow.   
Placed pt on bipap at 0158 before fall back time change.  Unable to finish original charting in flow sheet at this time due to fall back and now is 0100.    
Pt telling nurse she didn't think the mask was working right.  Pt removed mask and writer inspected the mask and bipap machine, made sure everything was put together correctly and bipap machine working properly.  Pt went to use bedside commode.  Discussed with pt to try mask again, had pt feel the mask with bipap machine on and feel the air blowing.  Once mask back on, machine working properly and had got Vt returns but pt says she can't feel it.  Increased pressure to 12/6 and pt says she can feel that better.  Pt fell back to sleep.  
Writer present, vitals and assessment as charted. Patient a/o, sitting up in chair with feet elevated. Patient denies any pain or needs at this time. Call light and bedside table within reach.  
patient management decisions.         Fact sheet for Healthcare Providers: https://www.fda.gov/media/872775/download  Fact sheet for Patients: https://www.fda.gov/media/592242/download        Methodology: Isothermal Nucleic Acid Amplification         Rapid influenza A/B antigens [[id number]] Collected: 11/02/24 2352    Order Status: Completed Specimen: Nasopharyngeal Updated: 11/03/24 0038     Flu A Antigen NEGATIVE     Comment: for Influenza A Antigen        Flu B Antigen NEGATIVE     Comment: for Influenza B Antigen.       Blood Culture 1 [[id number]] Collected: 11/02/24 2350    Order Status: Completed Specimen: Blood Updated: 11/03/24 0707     Specimen Description .BLOOD     Special Requests LAC 20ML     Culture NO GROWTH 6 HOURS              Imaging Data:   XR CHEST PORTABLE    Result Date: 11/3/2024  EXAMINATION: ONE XRAY VIEW OF THE CHEST 11/2/2024 11:58 pm COMPARISON: 09/29/2024 HISTORY: ORDERING SYSTEM PROVIDED HISTORY: shortness of breath TECHNOLOGIST PROVIDED HISTORY: shortness of breath FINDINGS: Normal heart size and pulmonary vasculature.  Patchy areas of airspace opacity are present nearly diffusely throughout both lungs, left worse than right.  Findings are similar to previous however the previously seen small to moderate right pleural effusion has resolved.  No pneumothorax.  Surrounding osseous and soft tissue structures show no acute abnormality.     Patchy areas of airspace opacity are present nearly diffusely throughout both lungs, left worse than right. Findings are similar to previous however the previously seen small to moderate right pleural effusion has resolved. Findings consistent with residual or recurrent pneumonia.         MEDICAL DECISION MAKING:  Primary Problem(s): Acute on chronic respiratory failure with hypoxemia and hypercapnia  Condition is improving  Treatment plan:   Continue supplemental O2  Plan for outpatient sleep study scheduled tomorrow  Imaging:   no further imaging

## 2024-11-04 NOTE — DISCHARGE INSTR - COC
Continuity of Care Form    Patient Name: oTshia South   :  1945  MRN:  [id number]    Admit date:  2024  Discharge date:  2024    Code Status Order: Full Code   Advance Directives:   Advance Care Flowsheet Documentation             Admitting Physician:  Jong Moraes MD  PCP: Jong Moraes MD    Discharging Nurse: Chanel WONG   Discharging Hospital Unit/Room#: I304/I304-01  Discharging Unit Phone Number: [phone]    Emergency Contact:   Extended Emergency Contact Information  Primary Emergency Contact: Jerrell South  Address: [address] [place] [facility]  Home Phone: [phone]  Mobile Phone: [phone]  Relation: Spouse  Hearing or visual needs: None  Other needs: None  Preferred language: English   needed? No  Secondary Emergency Contact: Jose Eduardo South  Home Phone: [phone]  Relation: Child   needed? No    Past Surgical History:  Past Surgical History:   Procedure Laterality Date    BACK SURGERY  1998    spinal cord stimulator    BACK SURGERY  1999    infusion pump insertion    BACK SURGERY  10/23/2003    spinal cord stimulator removed    BACK SURGERY  10/23/2003    new infusion pump placed    BACK SURGERY  2017    replaced infusion pump    CARPAL TUNNEL RELEASE Right 1999    CARPAL TUNNEL RELEASE Left 1999    CARPAL TUNNEL RELEASE Right 2002    CARPAL TUNNEL RELEASE Left 2003    CERVICAL DISC SURGERY  10/25/2004    anterior cervical diskectomy C7-T1    CERVICAL DISC SURGERY  2004    anterior cervical discectomy C6-7 (complicated by damaged nerve to vocal cord)    CRANIOTOMY Left 2022    LEFT CRANIOTOMY FOR SUBDURAL HEMATOMA EVACUATION performed by Yessica Lewis DO at [facility] OR    FRACTURE SURGERY Left 2018    ORIF wrist    HUMERUS FRACTURE SURGERY Right 10/03/2010    HYSTERECTOMY (CERVIX STATUS UNKNOWN)  1991    KNEE ARTHROSCOPY Right 2011

## 2024-11-05 ENCOUNTER — HOSPITAL ENCOUNTER (OUTPATIENT)
Dept: SLEEP CENTER | Age: 79
Discharge: HOME | End: 2024-11-05
Payer: MEDICARE

## 2024-11-05 VITALS — HEIGHT: 65 IN | BODY MASS INDEX: 23.49 KG/M2 | WEIGHT: 141 LBS

## 2024-11-05 DIAGNOSIS — J96.22 ACUTE ON CHRONIC RESPIRATORY FAILURE WITH HYPOXIA AND HYPERCAPNIA: ICD-10-CM

## 2024-11-05 DIAGNOSIS — G47.33 OSA AND COPD OVERLAP SYNDROME (HCC): ICD-10-CM

## 2024-11-05 DIAGNOSIS — J44.9 OSA AND COPD OVERLAP SYNDROME (HCC): ICD-10-CM

## 2024-11-05 DIAGNOSIS — J96.21 ACUTE ON CHRONIC RESPIRATORY FAILURE WITH HYPOXIA AND HYPERCAPNIA: ICD-10-CM

## 2024-11-05 PROCEDURE — 95811 POLYSOM 6/>YRS CPAP 4/> PARM: CPT

## 2024-11-06 LAB — STATUS: NORMAL

## 2024-11-06 NOTE — PROGRESS NOTES
Patient arrived for split night sleep testing 11/5/24 at 8:00pm. Testing explained, all questions answered, pt voices understanding.   Patient qualified to [facility] for DME  Heriberto full face size S/M

## 2024-11-11 ENCOUNTER — CARE COORDINATION (OUTPATIENT)
Dept: CARE COORDINATION | Age: 79
End: 2024-11-11

## 2024-11-11 NOTE — CARE COORDINATION
Care Transitions Post-Acute Facility Update Call    2024    Patient: Toshia South Patient : 1945   MRN: [id number]  Reason for Admission:    Discharge Date: 10/2/24 RARS: Readmission Risk Score: 25.4        Post Acute Facility Update   Post Acute Facility: [facility] [place]          Nursing   Wounds: Neg   Skilled Medication therapies: PT /OT / nursing   Disease specific clinical considerations: MDD, chronic back pain, iron deficiency anemia, COPD, biventricular CHF, PAF, IPF, SDH, GERD, O2 dependency, acute on chronic respiratory failure with hypoxia, pneumonia, biventricular heart failure, citrobacter infection, dysphagia, zenker diverticulum, bronchiectasis, malnutrition, secondary hypercoagulable state, back surgery, CTS release, cervical discectomy, craniotomy, wrist surgery, humerus fx surgery, hysterectomy, knee surgery, lumbar discectomy, neck surgery, toe amputation, R TKA, chronic pain syndrome, dilaudid infusion pump, osteoporosis    Reported Nursing Updates: VS /67 P 71 R 18 T 97.4 SP02 95%  wt 142.8lbs     Oxygen dependent - needs new BIPAP ; has sleep study 11/15       Rehab/Functional   Cognitive function assessment: ALERT AND ORIENTED X 3   ADLs: Moderate Assistance   Bed Mobility: Minimal Assistance   Transfer Assistance: Minimal Assistance   Ambulation Assistance: Dependent   How far (in feet) is the patient ambulating?: 0   Does patient use an assistive device?: Yes   Assistive Devices: FWW, 4WW, cane, wheelchair   Rehab Notes: Prior Living Description: The patient lives home with  in a two story home with 1 + 1 steps to enter home without rails. The patient normally walks with a FWW or a SBQC. The patient sleeps in recliner part of a sectional couch. Patient performs all IADL's such as cooking, cleaning, etc. Does not drive. Bathroom is a tub shower combo, usually stands but does have a seat. Has been on nectar thick liquids at home but would take little sips of

## 2024-11-14 ENCOUNTER — CARE COORDINATION (OUTPATIENT)
Dept: CARE COORDINATION | Age: 79
End: 2024-11-14

## 2024-11-14 NOTE — CARE COORDINATION
self-schedule AMIE appointment.   Future Appointments         Provider Specialty Dept Phone    12/20/2024 1:45 PM (Arrive by 1:30 PM) [facility] CAT SCAN ROOM Radiology [phone]    1/20/2025 11:00 AM Boy Dennis MD Pulmonology [phone]    2/13/2025 10:40 AM Jose Miner MD Cardiology [phone]    4/21/2025 2:15 PM Boy Dennis MD Pulmonology [phone]            Post Acute Care Manager provided contact information.  Plan for follow-up call in 2-5 days based on severity of symptoms and risk factors.  Plan for next call: symptom management-COUGH, NEW OR WORSENING SX  self management-how is she managing at home  follow-up appointment-did she get PCP appt?  referral to ambulatory care manager-today      TENNILLE DUDLEY RN

## 2024-11-19 ENCOUNTER — CARE COORDINATION (OUTPATIENT)
Dept: CARE COORDINATION | Age: 79
End: 2024-11-19

## 2024-11-19 NOTE — CARE COORDINATION
Ambulatory Care Coordination Note     11/19/2024      Patient outreach attempt by this ACM today to offer care management services. ACM was unable to reach the patient by telephone today;   No voicemail set up      ACM: Delphine Gao, MARVIN     Care Summary Note: hand off from post acute care transitions     PCP/Specialist follow up:   Future Appointments         Provider Specialty Dept Phone    12/11/2024 9:00 AM Hermila Gabriel APRN - CNP Internal Medicine [phone]    12/20/2024 1:45 PM (Arrive by 1:30 PM) [facility] SCAN ROOM Radiology [phone]    1/20/2025 11:00 AM Boy Dennis MD Pulmonology [phone]    2/13/2025 10:40 AM Jose Miner MD Cardiology [phone]    4/21/2025 2:15 PM Boy Dennis MD Pulmonology [phone]            Follow Up:   Plan for next ACM outreach in approximately 1 week to complete:  - outreach attempt to offer care management services.

## 2024-11-27 NOTE — PROGRESS NOTES
Office Procedure:     Diagnosis: 2 cm inclusion cyst of chest wall    Surgeon: Dr. Janessa Castillo    Procedure:  Mark Sam simple excision inclusion cyst of chest wall down to subcutaneous layer, 2 cm size    Anesthesia: local    EBL: less than 1 ml    Complications: none    Details:    Informed consent obtained. Site marked/inspected, prepped in sterile fashion. Local anesthetic infiltrated. Surgical pause performed. 15 blade used to sharply incise skin over the cyst in a transverse orientation, then Iris scissors used to sharply excise the cyst capsule and contents intact down to deep subcutaneous layer. Wound irrigated. Incision closed with absorbable sutures dermal and subcuticular closure. There is a small dead space resultant that will likely accumulate a temprary small sterile seroma. May drain. Direct pressure prn. Wound should heal without problems, call/return as needed. Benign cyst. No need to send for path. Tolerated well. Wound care discussed.
116

## 2024-11-29 ENCOUNTER — CARE COORDINATION (OUTPATIENT)
Dept: CARE COORDINATION | Age: 79
End: 2024-11-29

## 2024-11-29 NOTE — CARE COORDINATION
Ambulatory Care Coordination Note     2024 3:18 PM     Patient Current Location:  Home: [address]  [place] [address]     This patient was received as a referral from Care Transition Nurse.    ACM contacted the patient by telephone. Verified name and  with patient as identifiers. Provided introduction to self, and explanation of the ACM role.   Patient accepted care management services at this time.          ACM: Delphine Gao RN     Challenges to be reviewed by the provider   Additional needs identified to be addressed with provider No  none               Method of communication with provider: none.    Utilization: N/A - Initial Call     Care Summary Note: Spoke with Toshia. Reports she is sore. Hips is hurting her badly. Has seen Ortho Dr. Larsen and had x-rays done. Hip in \"bad shape\" and is bone on bone. Reports she has clicking and popping with walking. Scheduled for hip replacement 2025 but is on cancellation list and hoping to get it done sooner. States she continues with back pain and has pain pump that helps. Follows with [facility] for pain pump. Reports that she thinks Levequin and Prednisone PCP prescribed last week are helping. Reports she continues with cough but overall feeling better breathing wise. Does have continuous oxygen at 4L per nasal cannula. Has new Bi-Pap machine too and sleeping better. Denied any new needs.  helpful at home. Does have home health[facility].     Offered patient enrollment in the Remote Patient Monitoring (RPM) program for in-home monitoring: Patient is not eligible for RPM program because: affiliate provider.     Medications Reviewed:   Patient denies any changes with medications and reports taking all medications as prescribed.    Advance Care Planning:   Patient declined education       PCP/Specialist follow up:   Future Appointments         Provider Specialty Dept Phone    2024 9:00 AM Hermila Gabriel APRN - CNP Internal

## 2024-12-10 ENCOUNTER — APPOINTMENT (OUTPATIENT)
Dept: GENERAL RADIOLOGY | Age: 79
End: 2024-12-10
Payer: MEDICARE

## 2024-12-10 ENCOUNTER — HOSPITAL ENCOUNTER (INPATIENT)
Age: 79
LOS: 4 days | Discharge: HOME HEALTH | End: 2024-12-14
Attending: INTERNAL MEDICINE | Admitting: INTERNAL MEDICINE
Payer: MEDICARE

## 2024-12-10 ENCOUNTER — APPOINTMENT (OUTPATIENT)
Dept: CT IMAGING | Age: 79
End: 2024-12-10
Attending: EMERGENCY MEDICINE
Payer: MEDICARE

## 2024-12-10 ENCOUNTER — CARE COORDINATION (OUTPATIENT)
Dept: CARE COORDINATION | Age: 79
End: 2024-12-10

## 2024-12-10 ENCOUNTER — APPOINTMENT (OUTPATIENT)
Dept: GENERAL RADIOLOGY | Age: 79
End: 2024-12-10
Attending: EMERGENCY MEDICINE
Payer: MEDICARE

## 2024-12-10 ENCOUNTER — HOSPITAL ENCOUNTER (EMERGENCY)
Age: 79
Discharge: ANOTHER HOSPITAL | End: 2024-12-10
Attending: EMERGENCY MEDICINE
Payer: MEDICARE

## 2024-12-10 ENCOUNTER — APPOINTMENT (OUTPATIENT)
Dept: GENERAL RADIOLOGY | Age: 79
End: 2024-12-10
Attending: INTERNAL MEDICINE
Payer: MEDICARE

## 2024-12-10 VITALS
WEIGHT: 142 LBS | TEMPERATURE: 100 F | HEART RATE: 74 BPM | DIASTOLIC BLOOD PRESSURE: 65 MMHG | OXYGEN SATURATION: 98 % | BODY MASS INDEX: 23.63 KG/M2 | SYSTOLIC BLOOD PRESSURE: 135 MMHG | RESPIRATION RATE: 18 BRPM

## 2024-12-10 DIAGNOSIS — J18.9 RECURRENT PNEUMONIA: Primary | ICD-10-CM

## 2024-12-10 DIAGNOSIS — J96.20 ACUTE ON CHRONIC RESPIRATORY FAILURE, UNSPECIFIED WHETHER WITH HYPOXIA OR HYPERCAPNIA: ICD-10-CM

## 2024-12-10 DIAGNOSIS — J81.0 ACUTE PULMONARY EDEMA: Primary | ICD-10-CM

## 2024-12-10 DIAGNOSIS — J44.9 CHRONIC OBSTRUCTIVE PULMONARY DISEASE, UNSPECIFIED COPD TYPE (HCC): Chronic | ICD-10-CM

## 2024-12-10 PROBLEM — J96.90 RESPIRATORY FAILURE: Status: ACTIVE | Noted: 2024-12-10

## 2024-12-10 LAB
ALBUMIN SERPL-MCNC: 3.2 G/DL (ref 3.5–5.2)
ALBUMIN SERPL-MCNC: 3.7 G/DL (ref 3.5–5.2)
ALBUMIN/GLOB SERPL: 1.2 {RATIO} (ref 1–2.5)
ALBUMIN/GLOB SERPL: 1.2 {RATIO} (ref 1–2.5)
ALLEN TEST: POSITIVE
ALP SERPL-CCNC: 61 U/L (ref 35–104)
ALP SERPL-CCNC: 75 U/L (ref 35–104)
ALT SERPL-CCNC: 11 U/L (ref 10–35)
ALT SERPL-CCNC: 11 U/L (ref 10–35)
ANION GAP SERPL CALCULATED.3IONS-SCNC: 11 MMOL/L (ref 9–16)
ANION GAP SERPL CALCULATED.3IONS-SCNC: 8 MMOL/L (ref 9–16)
ANTI-XA UNFRAC HEPARIN: 1.13 IU/L
ARTERIAL PATENCY WRIST A: ABNORMAL
ARTERIAL PATENCY WRIST A: ABNORMAL
ARTERIAL PATENCY WRIST A: YES
AST SERPL-CCNC: 19 U/L (ref 10–35)
AST SERPL-CCNC: 21 U/L (ref 10–35)
B PARAP IS1001 DNA NPH QL NAA+NON-PROBE: NOT DETECTED
B PERT DNA SPEC QL NAA+PROBE: NOT DETECTED
BACTERIA URNS QL MICRO: ABNORMAL
BACTERIA URNS QL MICRO: NORMAL
BASOPHILS # BLD: 0.03 K/UL (ref 0–0.2)
BASOPHILS NFR BLD: 0 % (ref 0–2)
BDY SITE: ABNORMAL
BILIRUB SERPL-MCNC: 0.3 MG/DL (ref 0–1.2)
BILIRUB SERPL-MCNC: 0.5 MG/DL (ref 0–1.2)
BILIRUB UR QL STRIP: NEGATIVE
BILIRUB UR QL STRIP: NEGATIVE
BNP SERPL-MCNC: 205 PG/ML (ref 0–450)
BODY TEMPERATURE: 37
BUN SERPL-MCNC: 15 MG/DL (ref 8–23)
BUN SERPL-MCNC: 15 MG/DL (ref 8–23)
BUN/CREAT SERPL: 25 (ref 9–20)
C PNEUM DNA NPH QL NAA+NON-PROBE: NOT DETECTED
CALCIUM SERPL-MCNC: 8.4 MG/DL (ref 8.6–10.4)
CALCIUM SERPL-MCNC: 9.4 MG/DL (ref 8.6–10.4)
CHLORIDE SERPL-SCNC: 102 MMOL/L (ref 98–107)
CHLORIDE SERPL-SCNC: 98 MMOL/L (ref 98–107)
CLARITY UR: CLEAR
CLARITY UR: CLEAR
CO2 SERPL-SCNC: 26 MMOL/L (ref 20–31)
CO2 SERPL-SCNC: 34 MMOL/L (ref 20–31)
COLOR UR: YELLOW
COLOR UR: YELLOW
CREAT SERPL-MCNC: 0.6 MG/DL (ref 0.5–0.9)
CREAT SERPL-MCNC: 0.6 MG/DL (ref 0.6–0.9)
CRP SERPL HS-MCNC: 101 MG/L (ref 0–5)
DIGOXIN SERPL-MCNC: 0.7 NG/ML (ref 0.8–2)
EKG ATRIAL RATE: 116 BPM
EKG ATRIAL RATE: 83 BPM
EKG P AXIS: 59 DEGREES
EKG P AXIS: 72 DEGREES
EKG P-R INTERVAL: 178 MS
EKG P-R INTERVAL: 186 MS
EKG Q-T INTERVAL: 310 MS
EKG Q-T INTERVAL: 370 MS
EKG QRS DURATION: 76 MS
EKG QRS DURATION: 82 MS
EKG QTC CALCULATION (BAZETT): 430 MS
EKG QTC CALCULATION (BAZETT): 434 MS
EKG R AXIS: -33 DEGREES
EKG R AXIS: -8 DEGREES
EKG T AXIS: 42 DEGREES
EKG T AXIS: 80 DEGREES
EKG VENTRICULAR RATE: 116 BPM
EKG VENTRICULAR RATE: 83 BPM
EOSINOPHIL # BLD: 0.1 K/UL (ref 0–0.44)
EOSINOPHILS RELATIVE PERCENT: 1 % (ref 1–4)
EPI CELLS #/AREA URNS HPF: ABNORMAL /HPF (ref 0–25)
EPI CELLS #/AREA URNS HPF: NORMAL /HPF (ref 0–5)
ERYTHROCYTE [DISTWIDTH] IN BLOOD BY AUTOMATED COUNT: 14.6 % (ref 11.8–14.4)
ERYTHROCYTE [DISTWIDTH] IN BLOOD BY AUTOMATED COUNT: 14.6 % (ref 11.8–14.4)
ERYTHROCYTE [SEDIMENTATION RATE] IN BLOOD BY PHOTOMETRIC METHOD: 22 MM/HR (ref 0–30)
FIO2 ON VENT: 40 %
FIO2 ON VENT: 70 %
FIO2 ON VENT: 73 %
FIO2: 45
FLUAV RNA NPH QL NAA+NON-PROBE: NOT DETECTED
FLUBV RNA NPH QL NAA+NON-PROBE: NOT DETECTED
GAS FLOW.O2 O2 DELIVERY SYS: ABNORMAL L/MIN
GFR, ESTIMATED: >90 ML/MIN/1.73M2
GFR, ESTIMATED: >90 ML/MIN/1.73M2
GLUCOSE BLD-MCNC: 226 MG/DL (ref 74–100)
GLUCOSE SERPL-MCNC: 103 MG/DL (ref 74–99)
GLUCOSE SERPL-MCNC: 212 MG/DL (ref 74–99)
GLUCOSE UR STRIP-MCNC: ABNORMAL MG/DL
GLUCOSE UR STRIP-MCNC: NEGATIVE MG/DL
HADV DNA NPH QL NAA+NON-PROBE: NOT DETECTED
HCO3 ARTERIAL: 32 MMOL/L (ref 22–26)
HCO3 VENOUS: 32.3 MMOL/L (ref 24–30)
HCO3 VENOUS: 35.8 MMOL/L (ref 24–30)
HCOV 229E RNA NPH QL NAA+NON-PROBE: NOT DETECTED
HCOV HKU1 RNA NPH QL NAA+NON-PROBE: NOT DETECTED
HCOV NL63 RNA NPH QL NAA+NON-PROBE: NOT DETECTED
HCOV OC43 RNA NPH QL NAA+NON-PROBE: NOT DETECTED
HCT VFR BLD AUTO: 35.2 % (ref 36.3–47.1)
HCT VFR BLD AUTO: 39.5 % (ref 36.3–47.1)
HGB BLD-MCNC: 11.1 G/DL (ref 11.9–15.1)
HGB BLD-MCNC: 12.7 G/DL (ref 11.9–15.1)
HGB UR QL STRIP.AUTO: ABNORMAL
HGB UR QL STRIP.AUTO: NEGATIVE
HMPV RNA NPH QL NAA+NON-PROBE: NOT DETECTED
HPIV1 RNA NPH QL NAA+NON-PROBE: NOT DETECTED
HPIV2 RNA NPH QL NAA+NON-PROBE: NOT DETECTED
HPIV3 RNA NPH QL NAA+NON-PROBE: NOT DETECTED
HPIV4 RNA NPH QL NAA+NON-PROBE: NOT DETECTED
IMM GRANULOCYTES # BLD AUTO: 0.03 K/UL (ref 0–0.3)
IMM GRANULOCYTES NFR BLD: 0 %
INR PPP: 1.2
KETONES UR STRIP-MCNC: NEGATIVE MG/DL
KETONES UR STRIP-MCNC: NEGATIVE MG/DL
L PNEUMO1 AG UR QL IA.RAPID: NEGATIVE
LACTATE BLDV-SCNC: 1.5 MMOL/L (ref 0.5–1.9)
LACTIC ACID, WHOLE BLOOD: 1.3 MMOL/L (ref 0.7–2.1)
LEUKOCYTE ESTERASE UR QL STRIP: ABNORMAL
LEUKOCYTE ESTERASE UR QL STRIP: NEGATIVE
LYMPHOCYTES NFR BLD: 0.86 K/UL (ref 1.1–3.7)
LYMPHOCYTES RELATIVE PERCENT: 7 % (ref 24–43)
M PNEUMO DNA NPH QL NAA+NON-PROBE: NOT DETECTED
MAGNESIUM SERPL-MCNC: 1.8 MG/DL (ref 1.6–2.4)
MCH RBC QN AUTO: 30.5 PG (ref 25.2–33.5)
MCH RBC QN AUTO: 30.6 PG (ref 25.2–33.5)
MCHC RBC AUTO-ENTMCNC: 31.5 G/DL (ref 28.4–34.8)
MCHC RBC AUTO-ENTMCNC: 32.2 G/DL (ref 28.4–34.8)
MCV RBC AUTO: 95 FL (ref 82.6–102.9)
MCV RBC AUTO: 97 FL (ref 82.6–102.9)
MONOCYTES NFR BLD: 0.9 K/UL (ref 0.1–1.2)
MONOCYTES NFR BLD: 8 % (ref 3–12)
NEUTROPHILS NFR BLD: 84 % (ref 36–65)
NEUTS SEG NFR BLD: 9.94 K/UL (ref 1.5–8.1)
NITRITE UR QL STRIP: NEGATIVE
NITRITE UR QL STRIP: NEGATIVE
NRBC BLD-RTO: 0 PER 100 WBC
NRBC BLD-RTO: 0 PER 100 WBC
O2 DELIVERY DEVICE: ABNORMAL
O2 SAT, ARTERIAL: 96.9 % (ref 95–98)
O2 SAT, VEN: 84.8 % (ref 60–85)
O2 SAT, VEN: 91 % (ref 60–85)
PARTIAL THROMBOPLASTIN TIME: 31.7 SEC (ref 23–36.5)
PCO2 ARTERIAL: 59.8 MMHG (ref 35–45)
PCO2 VENOUS: 55.1 MM HG (ref 39–55)
PCO2 VENOUS: 56.2 MM HG (ref 39–55)
PCO2, ART, TEMP ADJ: 59.8 (ref 35–45)
PCO2, VEN, TEMP ADJ: 55.1 MMHG (ref 39–55)
PCO2, VEN, TEMP ADJ: 56.2 MMHG (ref 39–55)
PH ARTERIAL: 7.35 (ref 7.35–7.45)
PH UR STRIP: 6.5 [PH] (ref 5–8)
PH UR STRIP: 7.5 [PH] (ref 5–9)
PH VENOUS: 7.38 (ref 7.32–7.42)
PH VENOUS: 7.43 (ref 7.32–7.42)
PH, ART, TEMP ADJ: 7.35 (ref 7.35–7.45)
PH, VEN, TEMP ADJ: 7.38 (ref 7.32–7.42)
PH, VEN, TEMP ADJ: 7.43 (ref 7.32–7.42)
PLATELET # BLD AUTO: 206 K/UL (ref 138–453)
PLATELET # BLD AUTO: 209 K/UL (ref 138–453)
PMV BLD AUTO: 10 FL (ref 8.1–13.5)
PMV BLD AUTO: 10.1 FL (ref 8.1–13.5)
PO2 ARTERIAL: 97.2 MMHG (ref 80–100)
PO2 VENOUS: 48.9 MM HG (ref 30–50)
PO2 VENOUS: 62.4 MM HG (ref 30–50)
PO2, ART, TEMP ADJ: 97.2 MMHG (ref 80–100)
PO2, VEN, TEMP ADJ: 48.9 MMHG (ref 30–50)
PO2, VEN, TEMP ADJ: 62.4 MMHG (ref 30–50)
POC HCO3: 34.7 MMOL/L (ref 21–28)
POC O2 SATURATION: 99 % (ref 94–98)
POC PCO2: 63 MM HG (ref 35–48)
POC PH: 7.35 (ref 7.35–7.45)
POC PO2: 142.9 MM HG (ref 83–108)
POSITIVE BASE EXCESS, ART: 4.4 MMOL/L (ref 0–2)
POSITIVE BASE EXCESS, ART: 7 MMOL/L (ref 0–3)
POSITIVE BASE EXCESS, VEN: 5.4 MMOL/L (ref 0–2)
POSITIVE BASE EXCESS, VEN: 9.4 MMOL/L (ref 0–2)
POTASSIUM SERPL-SCNC: 3.8 MMOL/L (ref 3.7–5.3)
POTASSIUM SERPL-SCNC: 3.8 MMOL/L (ref 3.7–5.3)
PROCALCITONIN SERPL-MCNC: 0.38 NG/ML (ref 0–0.09)
PROT SERPL-MCNC: 5.8 G/DL (ref 6.6–8.7)
PROT SERPL-MCNC: 6.6 G/DL (ref 6.6–8.7)
PROT UR STRIP-MCNC: ABNORMAL MG/DL
PROT UR STRIP-MCNC: NEGATIVE MG/DL
PROTHROMBIN TIME: 15.4 SEC (ref 11.7–14.9)
RBC # BLD AUTO: 3.63 M/UL (ref 3.95–5.11)
RBC # BLD AUTO: 4.16 M/UL (ref 3.95–5.11)
RBC #/AREA URNS HPF: ABNORMAL /HPF (ref 0–2)
RBC #/AREA URNS HPF: NORMAL /HPF (ref 0–4)
RSV RNA NPH QL NAA+NON-PROBE: NOT DETECTED
RV+EV RNA NPH QL NAA+NON-PROBE: NOT DETECTED
S PNEUM AG SPEC QL: NEGATIVE
SAMPLE SITE: ABNORMAL
SARS-COV-2 RDRP RESP QL NAA+PROBE: NOT DETECTED
SARS-COV-2 RNA NPH QL NAA+NON-PROBE: NOT DETECTED
SODIUM SERPL-SCNC: 139 MMOL/L (ref 136–145)
SODIUM SERPL-SCNC: 140 MMOL/L (ref 136–145)
SP GR UR STRIP: 1.01 (ref 1.01–1.02)
SP GR UR STRIP: 1.04 (ref 1–1.03)
SPECIMEN DESCRIPTION: NORMAL
SPECIMEN DESCRIPTION: NORMAL
SPECIMEN SOURCE: NORMAL
TROPONIN I SERPL HS-MCNC: 32 NG/L (ref 0–14)
TROPONIN I SERPL HS-MCNC: 53 NG/L (ref 0–14)
TROPONIN I SERPL HS-MCNC: 55 NG/L (ref 0–14)
TROPONIN I SERPL HS-MCNC: 62 NG/L (ref 0–14)
TROPONIN I SERPL HS-MCNC: 72 NG/L (ref 0–14)
UROBILINOGEN UR STRIP-ACNC: NORMAL EU/DL (ref 0–1)
UROBILINOGEN UR STRIP-ACNC: NORMAL EU/DL (ref 0–1)
WBC #/AREA URNS HPF: ABNORMAL /HPF (ref 0–5)
WBC #/AREA URNS HPF: NORMAL /HPF (ref 0–5)
WBC OTHER # BLD: 11.9 K/UL (ref 3.5–11.3)
WBC OTHER # BLD: 15.3 K/UL (ref 3.5–11.3)

## 2024-12-10 PROCEDURE — 81001 URINALYSIS AUTO W/SCOPE: CPT

## 2024-12-10 PROCEDURE — 99291 CRITICAL CARE FIRST HOUR: CPT | Performed by: INTERNAL MEDICINE

## 2024-12-10 PROCEDURE — 94002 VENT MGMT INPAT INIT DAY: CPT

## 2024-12-10 PROCEDURE — 94664 DEMO&/EVAL PT USE INHALER: CPT

## 2024-12-10 PROCEDURE — 87070 CULTURE OTHR SPECIMN AEROBIC: CPT

## 2024-12-10 PROCEDURE — 85652 RBC SED RATE AUTOMATED: CPT

## 2024-12-10 PROCEDURE — 94640 AIRWAY INHALATION TREATMENT: CPT

## 2024-12-10 PROCEDURE — 85027 COMPLETE CBC AUTOMATED: CPT

## 2024-12-10 PROCEDURE — 87635 SARS-COV-2 COVID-19 AMP PRB: CPT

## 2024-12-10 PROCEDURE — 71045 X-RAY EXAM CHEST 1 VIEW: CPT

## 2024-12-10 PROCEDURE — 85610 PROTHROMBIN TIME: CPT

## 2024-12-10 PROCEDURE — 31500 INSERT EMERGENCY AIRWAY: CPT

## 2024-12-10 PROCEDURE — 80053 COMPREHEN METABOLIC PANEL: CPT

## 2024-12-10 PROCEDURE — 2700000000 HC OXYGEN THERAPY PER DAY

## 2024-12-10 PROCEDURE — 2000000000 HC ICU R&B

## 2024-12-10 PROCEDURE — 2580000003 HC RX 258: Performed by: EMERGENCY MEDICINE

## 2024-12-10 PROCEDURE — 6360000002 HC RX W HCPCS

## 2024-12-10 PROCEDURE — 82803 BLOOD GASES ANY COMBINATION: CPT

## 2024-12-10 PROCEDURE — 87641 MR-STAPH DNA AMP PROBE: CPT

## 2024-12-10 PROCEDURE — 87040 BLOOD CULTURE FOR BACTERIA: CPT

## 2024-12-10 PROCEDURE — 82805 BLOOD GASES W/O2 SATURATION: CPT

## 2024-12-10 PROCEDURE — 2580000003 HC RX 258

## 2024-12-10 PROCEDURE — 93010 ELECTROCARDIOGRAM REPORT: CPT | Performed by: INTERNAL MEDICINE

## 2024-12-10 PROCEDURE — 6360000004 HC RX CONTRAST MEDICATION: Performed by: EMERGENCY MEDICINE

## 2024-12-10 PROCEDURE — 84484 ASSAY OF TROPONIN QUANT: CPT

## 2024-12-10 PROCEDURE — 94761 N-INVAS EAR/PLS OXIMETRY MLT: CPT

## 2024-12-10 PROCEDURE — 96366 THER/PROPH/DIAG IV INF ADDON: CPT

## 2024-12-10 PROCEDURE — 6370000000 HC RX 637 (ALT 250 FOR IP)

## 2024-12-10 PROCEDURE — 36600 WITHDRAWAL OF ARTERIAL BLOOD: CPT

## 2024-12-10 PROCEDURE — 87899 AGENT NOS ASSAY W/OPTIC: CPT

## 2024-12-10 PROCEDURE — 83735 ASSAY OF MAGNESIUM: CPT

## 2024-12-10 PROCEDURE — 83605 ASSAY OF LACTIC ACID: CPT

## 2024-12-10 PROCEDURE — 96367 TX/PROPH/DG ADDL SEQ IV INF: CPT

## 2024-12-10 PROCEDURE — 86140 C-REACTIVE PROTEIN: CPT

## 2024-12-10 PROCEDURE — 93005 ELECTROCARDIOGRAM TRACING: CPT | Performed by: EMERGENCY MEDICINE

## 2024-12-10 PROCEDURE — 87086 URINE CULTURE/COLONY COUNT: CPT

## 2024-12-10 PROCEDURE — 80162 ASSAY OF DIGOXIN TOTAL: CPT

## 2024-12-10 PROCEDURE — 71260 CT THORAX DX C+: CPT

## 2024-12-10 PROCEDURE — 87449 NOS EACH ORGANISM AG IA: CPT

## 2024-12-10 PROCEDURE — 96365 THER/PROPH/DIAG IV INF INIT: CPT

## 2024-12-10 PROCEDURE — 93005 ELECTROCARDIOGRAM TRACING: CPT

## 2024-12-10 PROCEDURE — 86738 MYCOPLASMA ANTIBODY: CPT

## 2024-12-10 PROCEDURE — 36415 COLL VENOUS BLD VENIPUNCTURE: CPT

## 2024-12-10 PROCEDURE — 83880 ASSAY OF NATRIURETIC PEPTIDE: CPT

## 2024-12-10 PROCEDURE — 5A1935Z RESPIRATORY VENTILATION, LESS THAN 24 CONSECUTIVE HOURS: ICD-10-PCS

## 2024-12-10 PROCEDURE — 0BH17EZ INSERTION OF ENDOTRACHEAL AIRWAY INTO TRACHEA, VIA NATURAL OR ARTIFICIAL OPENING: ICD-10-PCS

## 2024-12-10 PROCEDURE — 2500000003 HC RX 250 WO HCPCS

## 2024-12-10 PROCEDURE — 6360000002 HC RX W HCPCS: Performed by: EMERGENCY MEDICINE

## 2024-12-10 PROCEDURE — 85730 THROMBOPLASTIN TIME PARTIAL: CPT

## 2024-12-10 PROCEDURE — 2500000003 HC RX 250 WO HCPCS: Performed by: EMERGENCY MEDICINE

## 2024-12-10 PROCEDURE — 82947 ASSAY GLUCOSE BLOOD QUANT: CPT

## 2024-12-10 PROCEDURE — 0202U NFCT DS 22 TRGT SARS-COV-2: CPT

## 2024-12-10 PROCEDURE — 84145 PROCALCITONIN (PCT): CPT

## 2024-12-10 PROCEDURE — 99285 EMERGENCY DEPT VISIT HI MDM: CPT

## 2024-12-10 PROCEDURE — 87205 SMEAR GRAM STAIN: CPT

## 2024-12-10 PROCEDURE — 85520 HEPARIN ASSAY: CPT

## 2024-12-10 PROCEDURE — 96376 TX/PRO/DX INJ SAME DRUG ADON: CPT

## 2024-12-10 PROCEDURE — 85025 COMPLETE CBC W/AUTO DIFF WBC: CPT

## 2024-12-10 RX ORDER — HEPARIN SODIUM 1000 [USP'U]/ML
60 INJECTION, SOLUTION INTRAVENOUS; SUBCUTANEOUS PRN
Status: DISCONTINUED | OUTPATIENT
Start: 2024-12-10 | End: 2024-12-12

## 2024-12-10 RX ORDER — IOPAMIDOL 755 MG/ML
75 INJECTION, SOLUTION INTRAVASCULAR
Status: COMPLETED | OUTPATIENT
Start: 2024-12-10 | End: 2024-12-10

## 2024-12-10 RX ORDER — MAGNESIUM SULFATE IN WATER 40 MG/ML
2000 INJECTION, SOLUTION INTRAVENOUS ONCE
Status: COMPLETED | OUTPATIENT
Start: 2024-12-10 | End: 2024-12-10

## 2024-12-10 RX ORDER — 0.9 % SODIUM CHLORIDE 0.9 %
1000 INTRAVENOUS SOLUTION INTRAVENOUS ONCE
Status: COMPLETED | OUTPATIENT
Start: 2024-12-10 | End: 2024-12-10

## 2024-12-10 RX ORDER — NOREPINEPHRINE BITARTRATE 0.06 MG/ML
1-100 INJECTION, SOLUTION INTRAVENOUS CONTINUOUS
Status: DISCONTINUED | OUTPATIENT
Start: 2024-12-10 | End: 2024-12-10 | Stop reason: HOSPADM

## 2024-12-10 RX ORDER — SODIUM CHLORIDE 9 MG/ML
INJECTION, SOLUTION INTRAVENOUS CONTINUOUS
Status: DISCONTINUED | OUTPATIENT
Start: 2024-12-10 | End: 2024-12-11

## 2024-12-10 RX ORDER — VANCOMYCIN 1 G/200ML
1000 INJECTION, SOLUTION INTRAVENOUS ONCE
Status: COMPLETED | OUTPATIENT
Start: 2024-12-10 | End: 2024-12-10

## 2024-12-10 RX ORDER — SODIUM CHLORIDE 0.9 % (FLUSH) 0.9 %
5-40 SYRINGE (ML) INJECTION PRN
Status: DISCONTINUED | OUTPATIENT
Start: 2024-12-10 | End: 2024-12-14 | Stop reason: HOSPADM

## 2024-12-10 RX ORDER — NOREPINEPHRINE BITARTRATE 0.06 MG/ML
1-100 INJECTION, SOLUTION INTRAVENOUS CONTINUOUS
Status: DISCONTINUED | OUTPATIENT
Start: 2024-12-10 | End: 2024-12-12

## 2024-12-10 RX ORDER — HEPARIN SODIUM 1000 [USP'U]/ML
60 INJECTION, SOLUTION INTRAVENOUS; SUBCUTANEOUS ONCE
Status: COMPLETED | OUTPATIENT
Start: 2024-12-10 | End: 2024-12-10

## 2024-12-10 RX ORDER — HEPARIN SODIUM 1000 [USP'U]/ML
30 INJECTION, SOLUTION INTRAVENOUS; SUBCUTANEOUS PRN
Status: DISCONTINUED | OUTPATIENT
Start: 2024-12-10 | End: 2024-12-12

## 2024-12-10 RX ORDER — HEPARIN SODIUM 10000 [USP'U]/100ML
5-30 INJECTION, SOLUTION INTRAVENOUS CONTINUOUS
Status: DISCONTINUED | OUTPATIENT
Start: 2024-12-10 | End: 2024-12-12

## 2024-12-10 RX ORDER — MAGNESIUM SULFATE IN WATER 40 MG/ML
2000 INJECTION, SOLUTION INTRAVENOUS PRN
Status: DISCONTINUED | OUTPATIENT
Start: 2024-12-10 | End: 2024-12-14 | Stop reason: HOSPADM

## 2024-12-10 RX ORDER — MIDAZOLAM HYDROCHLORIDE 1 MG/ML
1-10 INJECTION, SOLUTION INTRAVENOUS CONTINUOUS
Status: DISCONTINUED | OUTPATIENT
Start: 2024-12-10 | End: 2024-12-10 | Stop reason: HOSPADM

## 2024-12-10 RX ORDER — IPRATROPIUM BROMIDE AND ALBUTEROL SULFATE 2.5; .5 MG/3ML; MG/3ML
SOLUTION RESPIRATORY (INHALATION)
Status: COMPLETED
Start: 2024-12-10 | End: 2024-12-10

## 2024-12-10 RX ORDER — POLYETHYLENE GLYCOL 3350 17 G/17G
17 POWDER, FOR SOLUTION ORAL DAILY PRN
Status: DISCONTINUED | OUTPATIENT
Start: 2024-12-10 | End: 2024-12-14 | Stop reason: HOSPADM

## 2024-12-10 RX ORDER — SODIUM CHLORIDE 0.9 % (FLUSH) 0.9 %
5-40 SYRINGE (ML) INJECTION EVERY 12 HOURS SCHEDULED
Status: DISCONTINUED | OUTPATIENT
Start: 2024-12-10 | End: 2024-12-14 | Stop reason: HOSPADM

## 2024-12-10 RX ORDER — MIDAZOLAM HYDROCHLORIDE 1 MG/ML
INJECTION, SOLUTION INTRAMUSCULAR; INTRAVENOUS
Status: COMPLETED
Start: 2024-12-10 | End: 2024-12-10

## 2024-12-10 RX ORDER — POTASSIUM CHLORIDE 7.45 MG/ML
10 INJECTION INTRAVENOUS PRN
Status: DISCONTINUED | OUTPATIENT
Start: 2024-12-10 | End: 2024-12-14 | Stop reason: HOSPADM

## 2024-12-10 RX ORDER — ASPIRIN 81 MG/1
81 TABLET, CHEWABLE ORAL DAILY
Status: DISCONTINUED | OUTPATIENT
Start: 2024-12-10 | End: 2024-12-14 | Stop reason: HOSPADM

## 2024-12-10 RX ORDER — ACETAMINOPHEN 325 MG/1
650 TABLET ORAL EVERY 6 HOURS PRN
Status: DISCONTINUED | OUTPATIENT
Start: 2024-12-10 | End: 2024-12-14 | Stop reason: HOSPADM

## 2024-12-10 RX ORDER — ACETAMINOPHEN 650 MG/1
650 SUPPOSITORY RECTAL EVERY 6 HOURS PRN
Status: DISCONTINUED | OUTPATIENT
Start: 2024-12-10 | End: 2024-12-14 | Stop reason: HOSPADM

## 2024-12-10 RX ORDER — IPRATROPIUM BROMIDE AND ALBUTEROL SULFATE 2.5; .5 MG/3ML; MG/3ML
1 SOLUTION RESPIRATORY (INHALATION)
Status: COMPLETED | OUTPATIENT
Start: 2024-12-10 | End: 2024-12-10

## 2024-12-10 RX ORDER — FENTANYL CITRATE 50 UG/ML
INJECTION, SOLUTION INTRAMUSCULAR; INTRAVENOUS
Status: DISCONTINUED
Start: 2024-12-10 | End: 2024-12-10 | Stop reason: WASHOUT

## 2024-12-10 RX ORDER — ONDANSETRON 4 MG/1
4 TABLET, ORALLY DISINTEGRATING ORAL EVERY 8 HOURS PRN
Status: DISCONTINUED | OUTPATIENT
Start: 2024-12-10 | End: 2024-12-14 | Stop reason: HOSPADM

## 2024-12-10 RX ORDER — SODIUM CHLORIDE 9 MG/ML
INJECTION, SOLUTION INTRAVENOUS PRN
Status: DISCONTINUED | OUTPATIENT
Start: 2024-12-10 | End: 2024-12-14 | Stop reason: HOSPADM

## 2024-12-10 RX ORDER — SUCCINYLCHOLINE CHLORIDE 20 MG/ML
100 INJECTION INTRAMUSCULAR; INTRAVENOUS ONCE
Status: COMPLETED | OUTPATIENT
Start: 2024-12-10 | End: 2024-12-10

## 2024-12-10 RX ORDER — ALBUTEROL SULFATE 0.83 MG/ML
2.5 SOLUTION RESPIRATORY (INHALATION) EVERY 6 HOURS PRN
Status: DISCONTINUED | OUTPATIENT
Start: 2024-12-10 | End: 2024-12-14 | Stop reason: HOSPADM

## 2024-12-10 RX ORDER — ONDANSETRON 2 MG/ML
4 INJECTION INTRAMUSCULAR; INTRAVENOUS EVERY 6 HOURS PRN
Status: DISCONTINUED | OUTPATIENT
Start: 2024-12-10 | End: 2024-12-14 | Stop reason: HOSPADM

## 2024-12-10 RX ORDER — IPRATROPIUM BROMIDE AND ALBUTEROL SULFATE 2.5; .5 MG/3ML; MG/3ML
1 SOLUTION RESPIRATORY (INHALATION) ONCE
Status: COMPLETED | OUTPATIENT
Start: 2024-12-10 | End: 2024-12-10

## 2024-12-10 RX ORDER — MIDAZOLAM HYDROCHLORIDE 2 MG/2ML
2 INJECTION, SOLUTION INTRAMUSCULAR; INTRAVENOUS ONCE
Status: COMPLETED | OUTPATIENT
Start: 2024-12-10 | End: 2024-12-10

## 2024-12-10 RX ORDER — KETAMINE HYDROCHLORIDE 100 MG/ML
INJECTION, SOLUTION INTRAMUSCULAR; INTRAVENOUS
Status: DISCONTINUED
Start: 2024-12-10 | End: 2024-12-10 | Stop reason: WASHOUT

## 2024-12-10 RX ORDER — IPRATROPIUM BROMIDE AND ALBUTEROL SULFATE 2.5; .5 MG/3ML; MG/3ML
1 SOLUTION RESPIRATORY (INHALATION)
Status: DISCONTINUED | OUTPATIENT
Start: 2024-12-10 | End: 2024-12-12

## 2024-12-10 RX ORDER — ETOMIDATE 2 MG/ML
20 INJECTION INTRAVENOUS ONCE
Status: COMPLETED | OUTPATIENT
Start: 2024-12-10 | End: 2024-12-10

## 2024-12-10 RX ORDER — POTASSIUM CHLORIDE 29.8 MG/ML
20 INJECTION INTRAVENOUS PRN
Status: DISCONTINUED | OUTPATIENT
Start: 2024-12-10 | End: 2024-12-14 | Stop reason: HOSPADM

## 2024-12-10 RX ADMIN — WATER 40 MG: 1 INJECTION INTRAMUSCULAR; INTRAVENOUS; SUBCUTANEOUS at 16:23

## 2024-12-10 RX ADMIN — IPRATROPIUM BROMIDE AND ALBUTEROL SULFATE 1 DOSE: 2.5; .5 SOLUTION RESPIRATORY (INHALATION) at 13:12

## 2024-12-10 RX ADMIN — SODIUM CHLORIDE: 9 INJECTION, SOLUTION INTRAVENOUS at 16:18

## 2024-12-10 RX ADMIN — SODIUM CHLORIDE 999 ML: 9 INJECTION, SOLUTION INTRAVENOUS at 09:58

## 2024-12-10 RX ADMIN — SODIUM CHLORIDE, PRESERVATIVE FREE 10 ML: 5 INJECTION INTRAVENOUS at 20:41

## 2024-12-10 RX ADMIN — IPRATROPIUM BROMIDE AND ALBUTEROL SULFATE 1 DOSE: 2.5; .5 SOLUTION RESPIRATORY (INHALATION) at 20:05

## 2024-12-10 RX ADMIN — SODIUM CHLORIDE, PRESERVATIVE FREE 10 ML: 5 INJECTION INTRAVENOUS at 20:42

## 2024-12-10 RX ADMIN — MIDAZOLAM 2 MG: 1 INJECTION INTRAMUSCULAR; INTRAVENOUS at 11:57

## 2024-12-10 RX ADMIN — SODIUM CHLORIDE 40 MG: 9 INJECTION INTRAMUSCULAR; INTRAVENOUS; SUBCUTANEOUS at 17:29

## 2024-12-10 RX ADMIN — SODIUM CHLORIDE: 9 INJECTION, SOLUTION INTRAVENOUS at 16:41

## 2024-12-10 RX ADMIN — Medication 5 MCG/MIN: at 10:38

## 2024-12-10 RX ADMIN — Medication 8 MCG/MIN: at 16:10

## 2024-12-10 RX ADMIN — IPRATROPIUM BROMIDE AND ALBUTEROL SULFATE 1 DOSE: .5; 2.5 SOLUTION RESPIRATORY (INHALATION) at 09:16

## 2024-12-10 RX ADMIN — HEPARIN SODIUM 3900 UNITS: 1000 INJECTION INTRAVENOUS; SUBCUTANEOUS at 18:25

## 2024-12-10 RX ADMIN — SUCCINYLCHOLINE CHLORIDE 100 MG: 20 INJECTION, SOLUTION INTRAMUSCULAR; INTRAVENOUS at 11:44

## 2024-12-10 RX ADMIN — HEPARIN SODIUM 12 UNITS/KG/HR: 10000 INJECTION, SOLUTION INTRAVENOUS at 18:25

## 2024-12-10 RX ADMIN — Medication 50 MCG/HR: at 16:13

## 2024-12-10 RX ADMIN — PIPERACILLIN AND TAZOBACTAM 3375 MG: 3; .375 INJECTION, POWDER, LYOPHILIZED, FOR SOLUTION INTRAVENOUS at 10:05

## 2024-12-10 RX ADMIN — IPRATROPIUM BROMIDE AND ALBUTEROL SULFATE 1 DOSE: .5; 2.5 SOLUTION RESPIRATORY (INHALATION) at 13:12

## 2024-12-10 RX ADMIN — SODIUM CHLORIDE, PRESERVATIVE FREE 10 ML: 5 INJECTION INTRAVENOUS at 20:40

## 2024-12-10 RX ADMIN — SODIUM CHLORIDE 1000 ML: 9 INJECTION, SOLUTION INTRAVENOUS at 11:45

## 2024-12-10 RX ADMIN — IOPAMIDOL 75 ML: 755 INJECTION, SOLUTION INTRAVENOUS at 12:57

## 2024-12-10 RX ADMIN — PIPERACILLIN AND TAZOBACTAM 3375 MG: 3; .375 INJECTION, POWDER, LYOPHILIZED, FOR SOLUTION INTRAVENOUS at 17:35

## 2024-12-10 RX ADMIN — ETOMIDATE 20 MG: 2 INJECTION, SOLUTION INTRAVENOUS at 11:44

## 2024-12-10 RX ADMIN — VANCOMYCIN HYDROCHLORIDE 750 MG: 750 INJECTION, POWDER, LYOPHILIZED, FOR SOLUTION INTRAVENOUS at 22:25

## 2024-12-10 RX ADMIN — MIDAZOLAM HYDROCHLORIDE 2 MG: 2 INJECTION, SOLUTION INTRAMUSCULAR; INTRAVENOUS at 11:57

## 2024-12-10 RX ADMIN — MIDAZOLAM HYDROCHLORIDE 2 MG/HR: 5 INJECTION, SOLUTION INTRAMUSCULAR; INTRAVENOUS at 11:54

## 2024-12-10 RX ADMIN — IPRATROPIUM BROMIDE AND ALBUTEROL SULFATE 1 DOSE: 2.5; .5 SOLUTION RESPIRATORY (INHALATION) at 09:16

## 2024-12-10 RX ADMIN — VANCOMYCIN 1000 MG: 1 INJECTION, SOLUTION INTRAVENOUS at 10:44

## 2024-12-10 RX ADMIN — MAGNESIUM SULFATE HEPTAHYDRATE 2000 MG: 40 INJECTION, SOLUTION INTRAVENOUS at 10:12

## 2024-12-10 ASSESSMENT — PULMONARY FUNCTION TESTS
PIF_VALUE: 21
PIF_VALUE: 19
PIF_VALUE: 18
PIF_VALUE: 13
PIF_VALUE: 30
PIF_VALUE: 25

## 2024-12-10 ASSESSMENT — LIFESTYLE VARIABLES
HOW OFTEN DO YOU HAVE A DRINK CONTAINING ALCOHOL: NEVER
HOW MANY STANDARD DRINKS CONTAINING ALCOHOL DO YOU HAVE ON A TYPICAL DAY: PATIENT DOES NOT DRINK

## 2024-12-10 NOTE — CARE COORDINATION
Ambulatory Care Coordination Note     12/10/2024 3:23 PM     Received notification that patient presented to [facility] ED via EMS today, 12/10/2024. Noted patient was later transferred to [facility].     Future Appointments   Date Time Provider Department Center   12/20/2024  1:45 PM [facility] CAT SCAN ROOM [facility] CT [facility] Rad   1/20/2025 11:00 AM [facility] [facility]   2/13/2025 10:40 AM Jose Miner MD TIFF CARD [facility]   4/21/2025  2:15 PM oBy Dennis MD [place] [facility]       Care Coordination Plan of Care:   This nurse Care Coordinator will  - await notification of patient discharge from [facility]   -defer all planned calls at this time due to inpatient status

## 2024-12-10 NOTE — FLOWSHEET NOTE
Writer assessed an implanted pain pump in patient's RUQ. Per patient's  & chart, the pump dispenses both Dilaudid & Bupivacaine daily for patient's chronic pain syndrome.

## 2024-12-10 NOTE — ED PROVIDER NOTES
[facility] EMERGENCY DEPARTMENT  Emergency Department Encounter  Emergency Medicine      Pt Name:Toshia South  MRN: [id number]  Birthdate 1945  Date of evaluation: 12/10/24  PCP:  Jong Moraes MD  9:16 AM EST      CHIEF COMPLAINT       Chief Complaint   Patient presents with    Shortness of Breath     Brought in by EMS for SOB. Found slumped over in her chair at 65%. Was not on her oxygen which she normally wears 4L. Given 125 solumedrol by squad. Placed on cpap originally but brought down her oxygen into the 80's. Placed on 15L non rebreather and back into the 90's. Patient alert but lethargic.  Pt reports coughing a lot at home with a fever.        HISTORY OF PRESENT ILLNESS  (Location/Symptom, Timing/Onset, Context/Setting, Quality, Duration, Modifying Factors, Severity.)      Toshia South is a 79 y.o. female who presents with shortness of breath, found slumped over chair, unresponsive, off her oxygen, ems called and patient was 65% on RA, was placed on oxygen, and started on NRB, and also got solumedrol and aerosols by EMS  H/o chornic resp failure, well-known to the emergency room.  Does follow with pulmonology he, has history of bronchiectasis, pulmonary fibrosis.  Chronically on 4 L oxygen.  Admitted in November for pneumonia.  Patient anticoagulated on Xarelto due to history of A-fib.  Per report patient also febrile with worsening cough at home.  Patient upon arrival was placed on high flow humidified nasal cannula.  Does have diffuse audible wheezing, but is able to communicate she is feeling better.  She still has elevated heart rate and high respiratory rate.    PAST MEDICAL / SURGICAL / SOCIAL / FAMILY HISTORY      has a past medical history of A-fib (HCC), Anxiety, Atrial fibrillation (HCC), Biventricular congestive heart failure (HCC), Bronchiectasis with acute exacerbation (HCC), CAD (coronary artery disease), Chronic pain syndrome, COPD (chronic obstructive pulmonary

## 2024-12-10 NOTE — PROGRESS NOTES
DAILY AS  NEEDED FOR WHEEZING 5/6/24   Jong Moraes MD   citalopram (CELEXA) 20 MG tablet Take 2 tablets by mouth daily 4/29/24   Hermila Gabriel APRN - CNP   ipratropium 0.5 mg-albuterol 2.5 mg (DUONEB) 0.5-2.5 (3) MG/3ML SOLN nebulizer solution inhale contents of 1 vial ( 3 MLS ) in nebulizer by mouth and INTO THE LUNGS every 4 hours if needed for shortness of breath or wheezing 1/23/24   Jong Moraes MD   brompheniramine-pseudoephedrine-DM 2-30-10 MG/5ML syrup Take 5 mLs by mouth 4 times daily as needed for Cough 12/19/23   Mehreen Arambula APRN - CNP   DULoxetine (CYMBALTA) 60 MG extended release capsule TAKE 1 CAPSULE BY MOUTH DAILY 12/12/23   Jong Moraes MD   calcium citrate-vitamin D (CITRACAL+D) 315-5 MG-MCG TABS per tablet Take 1 tablet by mouth daily    Gaby Clemons MD   Multiple Vitamins-Minerals (THERAPEUTIC MULTIVITAMIN-MINERALS) tablet Take 1 tablet by mouth daily    Gaby Clemons MD   OXYGEN Inhale 4 L into the lungs continuous    Gaby Clemons MD   vitamin C (ASCORBIC ACID) 500 MG tablet Take 1 tablet by mouth 2 times daily    aGby Clemons MD   bumetanide (BUMEX) 2 MG tablet take 1 tablet by mouth once daily  Patient taking differently: Take 1 tablet by mouth daily as needed 6/12/23   Jong Moraes MD   tiZANidine (ZANAFLEX) 4 MG tablet Take 1 tablet by mouth as needed 4/21/23   Gaby Clemons MD   guaiFENesin (MUCINEX) 600 MG extended release tablet Take 1 tablet by mouth 2 times daily 2/25/23   Hitesh Jason APRN - NP   NONFORMULARY Pt on pump with Bupivacaine 6.178mg/day, Hydromorphone 3.089mg/day    Gaby Clemons MD   ASPIRIN 81 PO Take 81 mg by mouth daily    Gaby Clemons MD   pregabalin (LYRICA) 300 MG capsule take 1 capsule by mouth twice a day 1/23/23   Gaby Clemons MD       Social History:   TOBACCO:   reports that she quit smoking about 48 years ago. Her smoking use included cigarettes.  pulmonary fibrosis) (HCC)    History of subdural hematoma    Fall as cause of accidental injury in home as place of occurrence, initial encounter    GERD (gastroesophageal reflux disease)    Oxygen dependent    Acute on chronic respiratory failure with hypoxia    Community acquired bacterial pneumonia    COPD exacerbation (HCC)    Biventricular heart failure (HCC)    Citrobacter infection    Dysphagia, oropharyngeal    Zenker diverticulum    Acute on chronic respiratory failure with hypoxia and hypercapnia    Bronchiectasis (HCC)    Chronic pain    Malnutrition of mild degree (HCC)    Multifocal pneumonia    Secondary hypercoagulable state ([facility])    Moderate malnutrition (HCC)    Acute respiratory failure with hypoxia    Acute on chronic diastolic CHF (congestive heart failure) (HCC)    Acute on chronic respiratory failure    Respiratory failure         Additional assessment:  Acute hypoxic respiratory failure due to recurrent multifocal pneumonia  Septic shock likely secondary to pneumonia  Elevated troponins   Bronchiectasis.   Pulmonary Fibrosis.   Chronic Hypoxemic Respiratory Failure on 4 L  Paroxysmal Atrial Fibrillation on Rivoraxaban   Chronic Diastolic Heart failure  Hypothyroidism    Neuro  - Intubated and sedated on fentanyl  - Daily SAT  - Neuro checks per protocol    Resp  Acute hypoxic respiratory failure due to recurrent multifocal pneumonia  Bronchiectasis.   Pulmonary Fibrosis.   Chronic Hypoxemic Respiratory Failure on 4L  - On Mechanical Ventilation  - Blood gas PRN  - SBT trial daily  - Initiated on Albuterol, Duoneb, IV solumedrol 40 mg BID, vancomycin and zosyn( due to recurrent multifocal pneumonia and recent hospitalization)  - F/u on respiratory culture and respiratory panel      Cardio  Septic shock likely secondary to pneumonia  Elevated troponins   Paroxysmal Atrial Fibrillation  Chronic Diastolic Heart failure  - On Levophed. Continue to wean down as patient tolerated  - On 75 ml/hr NS.

## 2024-12-10 NOTE — PLAN OF CARE
Problem: Safety - Medical Restraint  Goal: Remains free of injury from restraints (Restraint for Interference with Medical Device)  Description: INTERVENTIONS:  1. Determine that other, less restrictive measures have been tried or would not be effective before applying the restraint  2. Evaluate the patient's condition at the time of restraint application  3. Inform patient/family regarding the reason for restraint  4. Q2H: Monitor safety, psychosocial status, comfort, nutrition and hydration  Outcome: Progressing  Flowsheets  Taken 12/10/2024 1600  Remains free of injury from restraints (restraint for interference with medical device): Every 2 hours: Monitor safety, psychosocial status, comfort, nutrition and hydration  Taken 12/10/2024 1558  Remains free of injury from restraints (restraint for interference with medical device):   Determine that other, less restrictive measures have been tried or would not be effective before applying the restraint   Evaluate the patient's condition at the time of restraint application   Inform patient/family regarding the reason for restraint   Every 2 hours: Monitor safety, psychosocial status, comfort, nutrition and hydration     Problem: Chronic Conditions and Co-morbidities  Goal: Patient's chronic conditions and co-morbidity symptoms are monitored and maintained or improved  Outcome: Progressing     Problem: Discharge Planning  Goal: Discharge to home or other facility with appropriate resources  Outcome: Progressing     Problem: Pain  Goal: Verbalizes/displays adequate comfort level or baseline comfort level  Outcome: Progressing     Problem: Skin/Tissue Integrity  Goal: Absence of new skin breakdown  Description: 1.  Monitor for areas of redness and/or skin breakdown  2.  Assess vascular access sites hourly  3.  Every 4-6 hours minimum:  Change oxygen saturation probe site  4.  Every 4-6 hours:  If on nasal continuous positive airway pressure, respiratory therapy assess

## 2024-12-10 NOTE — FLOWSHEET NOTE
Patient arrived to room 3025 from [place] with the following belongings: 1 pair of socks & 8 rings. All of these patient belongings were given to patient's , Jerrell and taken home.

## 2024-12-10 NOTE — ED NOTES
Contacted [facility] to make sure intensivist is being paged at [facility], not hospitalist.  Will contact nurse to make her aware.

## 2024-12-11 ENCOUNTER — APPOINTMENT (OUTPATIENT)
Age: 79
End: 2024-12-11
Attending: INTERNAL MEDICINE
Payer: MEDICARE

## 2024-12-11 LAB
ANION GAP SERPL CALCULATED.3IONS-SCNC: 7 MMOL/L (ref 9–16)
BASOPHILS # BLD: 0 K/UL (ref 0–0.2)
BASOPHILS NFR BLD: 0 % (ref 0–2)
BUN SERPL-MCNC: 17 MG/DL (ref 8–23)
CALCIUM SERPL-MCNC: 8.2 MG/DL (ref 8.6–10.4)
CHLORIDE SERPL-SCNC: 105 MMOL/L (ref 98–107)
CO2 SERPL-SCNC: 28 MMOL/L (ref 20–31)
CREAT SERPL-MCNC: 0.4 MG/DL (ref 0.6–0.9)
ECHO AO ROOT DIAM: 3 CM
ECHO AO ROOT INDEX: 1.79 CM/M2
ECHO AV AREA PEAK VELOCITY: 2.6 CM2
ECHO AV AREA VTI: 2.8 CM2
ECHO AV AREA/BSA PEAK VELOCITY: 1.5 CM2/M2
ECHO AV AREA/BSA VTI: 1.7 CM2/M2
ECHO AV MEAN GRADIENT: 7 MMHG
ECHO AV MEAN VELOCITY: 1.2 M/S
ECHO AV PEAK GRADIENT: 15 MMHG
ECHO AV PEAK VELOCITY: 1.9 M/S
ECHO AV VELOCITY RATIO: 0.84
ECHO AV VTI: 31.1 CM
ECHO BSA: 1.74 M2
ECHO LA AREA 2C: 27.6 CM2
ECHO LA AREA 4C: 23.9 CM2
ECHO LA DIAMETER INDEX: 1.85 CM/M2
ECHO LA DIAMETER: 3.1 CM
ECHO LA MAJOR AXIS: 7 CM
ECHO LA MINOR AXIS: 7.5 CM
ECHO LA TO AORTIC ROOT RATIO: 1.03
ECHO LA VOL BP: 77 ML (ref 22–52)
ECHO LA VOL MOD A2C: 83 ML (ref 22–52)
ECHO LA VOL MOD A4C: 68 ML (ref 22–52)
ECHO LA VOL/BSA BIPLANE: 46 ML/M2 (ref 16–34)
ECHO LA VOLUME INDEX MOD A2C: 49 ML/M2 (ref 16–34)
ECHO LA VOLUME INDEX MOD A4C: 40 ML/M2 (ref 16–34)
ECHO LV EDV A2C: 62 ML
ECHO LV EDV A4C: 59 ML
ECHO LV EDV INDEX A4C: 35 ML/M2
ECHO LV EDV NDEX A2C: 37 ML/M2
ECHO LV EJECTION FRACTION A2C: 72 %
ECHO LV EJECTION FRACTION A4C: 80 %
ECHO LV EJECTION FRACTION BIPLANE: 77 % (ref 55–100)
ECHO LV ESV A2C: 17 ML
ECHO LV ESV A4C: 12 ML
ECHO LV ESV INDEX A2C: 10 ML/M2
ECHO LV ESV INDEX A4C: 7 ML/M2
ECHO LV FRACTIONAL SHORTENING: 26 % (ref 28–44)
ECHO LV INTERNAL DIMENSION DIASTOLE INDEX: 2.08 CM/M2
ECHO LV INTERNAL DIMENSION DIASTOLIC: 3.5 CM (ref 3.9–5.3)
ECHO LV INTERNAL DIMENSION SYSTOLIC INDEX: 1.55 CM/M2
ECHO LV INTERNAL DIMENSION SYSTOLIC: 2.6 CM
ECHO LV IVSD: 0.8 CM (ref 0.6–0.9)
ECHO LV MASS 2D: 75.3 G (ref 67–162)
ECHO LV MASS INDEX 2D: 44.8 G/M2 (ref 43–95)
ECHO LV POSTERIOR WALL DIASTOLIC: 0.8 CM (ref 0.6–0.9)
ECHO LV RELATIVE WALL THICKNESS RATIO: 0.46
ECHO LVOT AREA: 3.1 CM2
ECHO LVOT AV VTI INDEX: 0.89
ECHO LVOT DIAM: 2 CM
ECHO LVOT MEAN GRADIENT: 6 MMHG
ECHO LVOT PEAK GRADIENT: 11 MMHG
ECHO LVOT PEAK VELOCITY: 1.6 M/S
ECHO LVOT STROKE VOLUME INDEX: 52 ML/M2
ECHO LVOT SV: 87.3 ML
ECHO LVOT VTI: 27.8 CM
ECHO MV AREA VTI: 4.2 CM2
ECHO MV LVOT VTI INDEX: 0.74
ECHO MV MAX VELOCITY: 1.9 M/S
ECHO MV MEAN GRADIENT: 5 MMHG
ECHO MV MEAN VELOCITY: 0.9 M/S
ECHO MV PEAK GRADIENT: 14 MMHG
ECHO MV VTI: 20.6 CM
ECHO PV MAX VELOCITY: 1.3 M/S
ECHO PV PEAK GRADIENT: 6 MMHG
ECHO RV BASAL DIMENSION: 4.1 CM
ECHO RV FREE WALL PEAK S': 32.2 CM/S
ECHO RV TAPSE: 3.2 CM (ref 1.7–?)
ECHO TV REGURGITANT MAX VELOCITY: 3.11 M/S
ECHO TV REGURGITANT PEAK GRADIENT: 39 MMHG
EKG ATRIAL RATE: 94 BPM
EKG P AXIS: 56 DEGREES
EKG P-R INTERVAL: 176 MS
EKG Q-T INTERVAL: 398 MS
EKG QRS DURATION: 80 MS
EKG QTC CALCULATION (BAZETT): 497 MS
EKG R AXIS: -13 DEGREES
EKG T AXIS: 43 DEGREES
EKG VENTRICULAR RATE: 94 BPM
EOSINOPHIL # BLD: 0 K/UL (ref 0–0.4)
EOSINOPHILS RELATIVE PERCENT: 0 % (ref 1–4)
ERYTHROCYTE [DISTWIDTH] IN BLOOD BY AUTOMATED COUNT: 14.6 % (ref 11.8–14.4)
FIO2: 45
GFR, ESTIMATED: >90 ML/MIN/1.73M2
GLUCOSE BLD-MCNC: 123 MG/DL (ref 65–105)
GLUCOSE BLD-MCNC: 132 MG/DL (ref 65–105)
GLUCOSE BLD-MCNC: 139 MG/DL (ref 65–105)
GLUCOSE BLD-MCNC: 139 MG/DL (ref 74–100)
GLUCOSE BLD-MCNC: 176 MG/DL (ref 65–105)
GLUCOSE SERPL-MCNC: 140 MG/DL (ref 74–99)
HCT VFR BLD AUTO: 33.2 % (ref 36.3–47.1)
HGB BLD-MCNC: 10.4 G/DL (ref 11.9–15.1)
IMM GRANULOCYTES # BLD AUTO: 0 K/UL (ref 0–0.3)
IMM GRANULOCYTES NFR BLD: 0 %
LACTIC ACID, WHOLE BLOOD: 1 MMOL/L (ref 0.7–2.1)
LACTIC ACID, WHOLE BLOOD: 2.3 MMOL/L (ref 0.7–2.1)
LYMPHOCYTES NFR BLD: 0.64 K/UL (ref 1–4.8)
LYMPHOCYTES RELATIVE PERCENT: 6 % (ref 24–44)
M PNEUMO IGM SER QL IA: 0.7
MCH RBC QN AUTO: 30.3 PG (ref 25.2–33.5)
MCHC RBC AUTO-ENTMCNC: 31.3 G/DL (ref 28.4–34.8)
MCV RBC AUTO: 96.8 FL (ref 82.6–102.9)
MICROORGANISM SPEC CULT: NO GROWTH
MONOCYTES NFR BLD: 0.32 K/UL (ref 0.1–0.8)
MONOCYTES NFR BLD: 3 % (ref 1–7)
MORPHOLOGY: ABNORMAL
MRSA, DNA, NASAL: ABNORMAL
NEUTROPHILS NFR BLD: 91 % (ref 36–66)
NEUTS SEG NFR BLD: 9.74 K/UL (ref 1.8–7.7)
NRBC BLD-RTO: 0 PER 100 WBC
PARTIAL THROMBOPLASTIN TIME: 38.4 SEC (ref 23–36.5)
PARTIAL THROMBOPLASTIN TIME: 48.2 SEC (ref 23–36.5)
PARTIAL THROMBOPLASTIN TIME: 48.6 SEC (ref 23–36.5)
PARTIAL THROMBOPLASTIN TIME: 74.6 SEC (ref 23–36.5)
PATIENT TEMP: 37.2
PLATELET # BLD AUTO: 179 K/UL (ref 138–453)
PMV BLD AUTO: 10.3 FL (ref 8.1–13.5)
POC HCO3: 31.4 MMOL/L (ref 21–28)
POC O2 SATURATION: 93.1 % (ref 94–98)
POC PCO2: 48.5 MM HG (ref 35–48)
POC PH: 7.42 (ref 7.35–7.45)
POC PO2: 67.2 MM HG (ref 83–108)
POSITIVE BASE EXCESS, ART: 6 MMOL/L (ref 0–3)
POTASSIUM SERPL-SCNC: 3.7 MMOL/L (ref 3.7–5.3)
RBC # BLD AUTO: 3.43 M/UL (ref 3.95–5.11)
SAMPLE SITE: ABNORMAL
SODIUM SERPL-SCNC: 140 MMOL/L (ref 136–145)
SPECIMEN DESCRIPTION: ABNORMAL
SPECIMEN DESCRIPTION: NORMAL
TROPONIN I SERPL HS-MCNC: 69 NG/L (ref 0–14)
WBC OTHER # BLD: 10.7 K/UL (ref 3.5–11.3)

## 2024-12-11 PROCEDURE — 2500000003 HC RX 250 WO HCPCS

## 2024-12-11 PROCEDURE — 97166 OT EVAL MOD COMPLEX 45 MIN: CPT

## 2024-12-11 PROCEDURE — 36415 COLL VENOUS BLD VENIPUNCTURE: CPT

## 2024-12-11 PROCEDURE — 2000000000 HC ICU R&B

## 2024-12-11 PROCEDURE — 36600 WITHDRAWAL OF ARTERIAL BLOOD: CPT

## 2024-12-11 PROCEDURE — 85025 COMPLETE CBC W/AUTO DIFF WBC: CPT

## 2024-12-11 PROCEDURE — 82803 BLOOD GASES ANY COMBINATION: CPT

## 2024-12-11 PROCEDURE — 92610 EVALUATE SWALLOWING FUNCTION: CPT

## 2024-12-11 PROCEDURE — 82607 VITAMIN B-12: CPT

## 2024-12-11 PROCEDURE — 6360000002 HC RX W HCPCS

## 2024-12-11 PROCEDURE — 85730 THROMBOPLASTIN TIME PARTIAL: CPT

## 2024-12-11 PROCEDURE — 84484 ASSAY OF TROPONIN QUANT: CPT

## 2024-12-11 PROCEDURE — 6370000000 HC RX 637 (ALT 250 FOR IP)

## 2024-12-11 PROCEDURE — 94640 AIRWAY INHALATION TREATMENT: CPT

## 2024-12-11 PROCEDURE — 82746 ASSAY OF FOLIC ACID SERUM: CPT

## 2024-12-11 PROCEDURE — 94003 VENT MGMT INPAT SUBQ DAY: CPT

## 2024-12-11 PROCEDURE — 93306 TTE W/DOPPLER COMPLETE: CPT

## 2024-12-11 PROCEDURE — 99291 CRITICAL CARE FIRST HOUR: CPT | Performed by: INTERNAL MEDICINE

## 2024-12-11 PROCEDURE — 93010 ELECTROCARDIOGRAM REPORT: CPT | Performed by: INTERNAL MEDICINE

## 2024-12-11 PROCEDURE — 2700000000 HC OXYGEN THERAPY PER DAY

## 2024-12-11 PROCEDURE — 99222 1ST HOSP IP/OBS MODERATE 55: CPT | Performed by: INTERNAL MEDICINE

## 2024-12-11 PROCEDURE — 2580000003 HC RX 258

## 2024-12-11 PROCEDURE — 80048 BASIC METABOLIC PNL TOTAL CA: CPT

## 2024-12-11 PROCEDURE — 83605 ASSAY OF LACTIC ACID: CPT

## 2024-12-11 PROCEDURE — 93306 TTE W/DOPPLER COMPLETE: CPT | Performed by: INTERNAL MEDICINE

## 2024-12-11 PROCEDURE — 94660 CPAP INITIATION&MGMT: CPT

## 2024-12-11 PROCEDURE — 94761 N-INVAS EAR/PLS OXIMETRY MLT: CPT

## 2024-12-11 PROCEDURE — 97530 THERAPEUTIC ACTIVITIES: CPT

## 2024-12-11 PROCEDURE — 97535 SELF CARE MNGMENT TRAINING: CPT

## 2024-12-11 RX ORDER — LORAZEPAM 2 MG/ML
0.5 INJECTION INTRAMUSCULAR ONCE
Status: COMPLETED | OUTPATIENT
Start: 2024-12-11 | End: 2024-12-11

## 2024-12-11 RX ORDER — DILTIAZEM HYDROCHLORIDE 5 MG/ML
10 INJECTION INTRAVENOUS EVERY 8 HOURS
Status: DISCONTINUED | OUTPATIENT
Start: 2024-12-11 | End: 2024-12-12

## 2024-12-11 RX ORDER — TRAZODONE HYDROCHLORIDE 50 MG/1
50 TABLET, FILM COATED ORAL NIGHTLY
Status: DISCONTINUED | OUTPATIENT
Start: 2024-12-11 | End: 2024-12-12

## 2024-12-11 RX ORDER — LORAZEPAM 2 MG/ML
0.25 INJECTION INTRAMUSCULAR ONCE
Status: COMPLETED | OUTPATIENT
Start: 2024-12-11 | End: 2024-12-11

## 2024-12-11 RX ORDER — DILTIAZEM HYDROCHLORIDE 120 MG/1
120 CAPSULE, COATED, EXTENDED RELEASE ORAL DAILY
Status: DISCONTINUED | OUTPATIENT
Start: 2024-12-11 | End: 2024-12-11

## 2024-12-11 RX ORDER — DIGOXIN 125 MCG
125 TABLET ORAL DAILY
Status: DISCONTINUED | OUTPATIENT
Start: 2024-12-11 | End: 2024-12-14 | Stop reason: HOSPADM

## 2024-12-11 RX ORDER — LORAZEPAM 2 MG/ML
1 INJECTION INTRAMUSCULAR ONCE
Status: COMPLETED | OUTPATIENT
Start: 2024-12-11 | End: 2024-12-11

## 2024-12-11 RX ORDER — BUMETANIDE 0.25 MG/ML
2 INJECTION, SOLUTION INTRAMUSCULAR; INTRAVENOUS DAILY
Status: DISCONTINUED | OUTPATIENT
Start: 2024-12-11 | End: 2024-12-13 | Stop reason: SDUPTHER

## 2024-12-11 RX ORDER — BUMETANIDE 1 MG/1
2 TABLET ORAL DAILY
Status: DISCONTINUED | OUTPATIENT
Start: 2024-12-11 | End: 2024-12-14 | Stop reason: HOSPADM

## 2024-12-11 RX ORDER — DEXMEDETOMIDINE HYDROCHLORIDE 4 UG/ML
.1-1.5 INJECTION, SOLUTION INTRAVENOUS CONTINUOUS
Status: DISCONTINUED | OUTPATIENT
Start: 2024-12-11 | End: 2024-12-13

## 2024-12-11 RX ORDER — LABETALOL HYDROCHLORIDE 5 MG/ML
10 INJECTION, SOLUTION INTRAVENOUS EVERY 6 HOURS PRN
Status: DISCONTINUED | OUTPATIENT
Start: 2024-12-11 | End: 2024-12-14 | Stop reason: HOSPADM

## 2024-12-11 RX ORDER — MIDODRINE HYDROCHLORIDE 5 MG/1
10 TABLET ORAL
Status: DISCONTINUED | OUTPATIENT
Start: 2024-12-11 | End: 2024-12-11

## 2024-12-11 RX ORDER — ATORVASTATIN CALCIUM 40 MG/1
40 TABLET, FILM COATED ORAL NIGHTLY
Status: DISCONTINUED | OUTPATIENT
Start: 2024-12-11 | End: 2024-12-14 | Stop reason: HOSPADM

## 2024-12-11 RX ADMIN — SODIUM CHLORIDE, PRESERVATIVE FREE 10 ML: 5 INJECTION INTRAVENOUS at 19:21

## 2024-12-11 RX ADMIN — DILTIAZEM HYDROCHLORIDE 10 MG: 5 INJECTION, SOLUTION INTRAVENOUS at 22:16

## 2024-12-11 RX ADMIN — SODIUM CHLORIDE 40 MG: 9 INJECTION INTRAMUSCULAR; INTRAVENOUS; SUBCUTANEOUS at 07:46

## 2024-12-11 RX ADMIN — IPRATROPIUM BROMIDE AND ALBUTEROL SULFATE 1 DOSE: 2.5; .5 SOLUTION RESPIRATORY (INHALATION) at 12:26

## 2024-12-11 RX ADMIN — SODIUM CHLORIDE, PRESERVATIVE FREE 10 ML: 5 INJECTION INTRAVENOUS at 07:46

## 2024-12-11 RX ADMIN — VANCOMYCIN HYDROCHLORIDE 750 MG: 750 INJECTION, POWDER, LYOPHILIZED, FOR SOLUTION INTRAVENOUS at 22:19

## 2024-12-11 RX ADMIN — WATER 40 MG: 1 INJECTION INTRAMUSCULAR; INTRAVENOUS; SUBCUTANEOUS at 04:24

## 2024-12-11 RX ADMIN — DEXMEDETOMIDINE HYDROCHLORIDE 0.2 MCG/KG/HR: 400 INJECTION, SOLUTION INTRAVENOUS at 23:24

## 2024-12-11 RX ADMIN — HEPARIN SODIUM 3900 UNITS: 1000 INJECTION INTRAVENOUS; SUBCUTANEOUS at 21:32

## 2024-12-11 RX ADMIN — PIPERACILLIN AND TAZOBACTAM 3375 MG: 3; .375 INJECTION, POWDER, LYOPHILIZED, FOR SOLUTION INTRAVENOUS at 10:40

## 2024-12-11 RX ADMIN — LORAZEPAM 1 MG: 2 INJECTION INTRAMUSCULAR; INTRAVENOUS at 01:00

## 2024-12-11 RX ADMIN — HEPARIN SODIUM 3900 UNITS: 1000 INJECTION INTRAVENOUS; SUBCUTANEOUS at 00:31

## 2024-12-11 RX ADMIN — BUMETANIDE 2 MG: 0.25 INJECTION INTRAMUSCULAR; INTRAVENOUS at 13:21

## 2024-12-11 RX ADMIN — WATER 40 MG: 1 INJECTION INTRAMUSCULAR; INTRAVENOUS; SUBCUTANEOUS at 16:48

## 2024-12-11 RX ADMIN — LORAZEPAM 0.5 MG: 2 INJECTION INTRAMUSCULAR; INTRAVENOUS at 19:43

## 2024-12-11 RX ADMIN — LABETALOL HYDROCHLORIDE 10 MG: 5 INJECTION, SOLUTION INTRAVENOUS at 18:33

## 2024-12-11 RX ADMIN — LORAZEPAM 0.26 MG: 2 INJECTION INTRAMUSCULAR; INTRAVENOUS at 13:44

## 2024-12-11 RX ADMIN — IPRATROPIUM BROMIDE AND ALBUTEROL SULFATE 1 DOSE: 2.5; .5 SOLUTION RESPIRATORY (INHALATION) at 08:40

## 2024-12-11 RX ADMIN — LORAZEPAM 0.5 MG: 2 INJECTION INTRAMUSCULAR; INTRAVENOUS at 18:09

## 2024-12-11 RX ADMIN — HEPARIN SODIUM 3900 UNITS: 1000 INJECTION INTRAVENOUS; SUBCUTANEOUS at 14:20

## 2024-12-11 RX ADMIN — VANCOMYCIN HYDROCHLORIDE 750 MG: 750 INJECTION, POWDER, LYOPHILIZED, FOR SOLUTION INTRAVENOUS at 09:38

## 2024-12-11 RX ADMIN — PIPERACILLIN AND TAZOBACTAM 3375 MG: 3; .375 INJECTION, POWDER, LYOPHILIZED, FOR SOLUTION INTRAVENOUS at 01:51

## 2024-12-11 RX ADMIN — IPRATROPIUM BROMIDE AND ALBUTEROL SULFATE 1 DOSE: 2.5; .5 SOLUTION RESPIRATORY (INHALATION) at 21:26

## 2024-12-11 RX ADMIN — SODIUM CHLORIDE: 9 INJECTION, SOLUTION INTRAVENOUS at 05:33

## 2024-12-11 RX ADMIN — PIPERACILLIN AND TAZOBACTAM 3375 MG: 3; .375 INJECTION, POWDER, LYOPHILIZED, FOR SOLUTION INTRAVENOUS at 17:31

## 2024-12-11 RX ADMIN — DILTIAZEM HYDROCHLORIDE 10 MG: 5 INJECTION, SOLUTION INTRAVENOUS at 13:25

## 2024-12-11 RX ADMIN — HEPARIN SODIUM 20 UNITS/KG/HR: 10000 INJECTION, SOLUTION INTRAVENOUS at 18:28

## 2024-12-11 RX ADMIN — LORAZEPAM 1 MG: 2 INJECTION INTRAMUSCULAR; INTRAVENOUS at 04:37

## 2024-12-11 ASSESSMENT — PULMONARY FUNCTION TESTS
PIF_VALUE: 14
PIF_VALUE: 20

## 2024-12-11 NOTE — PLAN OF CARE
Problem: Safety - Medical Restraint  Goal: Remains free of injury from restraints (Restraint for Interference with Medical Device)  Description: INTERVENTIONS:  1. Determine that other, less restrictive measures have been tried or would not be effective before applying the restraint  2. Evaluate the patient's condition at the time of restraint application  3. Inform patient/family regarding the reason for restraint  4. Q2H: Monitor safety, psychosocial status, comfort, nutrition and hydration  12/11/2024 1004 by Remigio Romero, RN  Outcome: Completed  Flowsheets (Taken 12/11/2024 1000)  Remains free of injury from restraints (restraint for interference with medical device): Every 2 hours: Monitor safety, psychosocial status, comfort, nutrition and hydration  12/11/2024 0849 by Remigio Romero RN  Outcome: Progressing  Flowsheets (Taken 12/11/2024 0800)  Remains free of injury from restraints (restraint for interference with medical device):   Determine that other, less restrictive measures have been tried or would not be effective before applying the restraint   Evaluate the patient's condition at the time of restraint application   Inform patient/family regarding the reason for restraint   Every 2 hours: Monitor safety, psychosocial status, comfort, nutrition and hydration  12/11/2024 0517 by Yenni Flores, RN  Outcome: Progressing  Flowsheets (Taken 12/10/2024 1800 by Remigio Romero, RN)  Remains free of injury from restraints (restraint for interference with medical device): Every 2 hours: Monitor safety, psychosocial status, comfort, nutrition and hydration

## 2024-12-11 NOTE — PROGRESS NOTES
Occupational Therapy  Occupational Therapy Initial Evaluation  Facility/Department: [facility] 3- [facility]   Patient Name: Toshia South        MRN: [id number]    : 1945    Date of Service: 2024    Discharge Recommendations Equipment recommendations listed below are based on what the patient would need if they were able to return to prior living arrangements at the time of discharge.   Discharge Recommendations: Patient would benefit from continued therapy after discharge  OT Equipment Recommendations  Equipment Needed: Yes  Mobility Devices: ADL Assistive Devices  ADL Assistive Devices: Transfer Tub Bench    No chief complaint on file.    Past Medical History:  has a past medical history of A-fib ([facility]), Anxiety, Atrial fibrillation (HCC), Biventricular congestive heart failure (HCC), Bronchiectasis with acute exacerbation ([facility]), CAD (coronary artery disease), Chronic pain syndrome, COPD (chronic obstructive pulmonary disease) (HCC), Depression, GERD (gastroesophageal reflux disease), Hypothyroidism, Impaired fasting glucose, Iron deficiency anemia, Osteoporosis, Pulmonary fibrosis (HCC), Sleep apnea, and Subdural hematoma.  Past Surgical History:  has a past surgical history that includes Hysterectomy (1991); Carpal tunnel release (Right, 1999); Total knee arthroplasty (Right, 2021); fracture surgery (Left, 2018); Wrist fracture surgery (Left, 2018); lumbar discectomy (1993); Lumbar disc surgery (02/15/1994); back surgery (1998); back surgery (1999); Carpal tunnel release (Left, 1999); Neck surgery (2002); Carpal tunnel release (Right, 2002); Carpal tunnel release (Left, 2003); back surgery (10/23/2003); back surgery (10/23/2003); Cervical disc surgery (10/25/2004); Cervical disc surgery (2004); Neck surgery (2005); Toe amputation (10/08/2006); Toe amputation (2008); lumbar laminectomy (2008); Toe amputation  address cognitive concerns  Short Term Goal 6: demo bending/reaching high/low func activity for 8 min+, while standing, using LRAD PRN and mod I    Plan  Occupational Therapy Plan  Times Per Week: 4-5x  Current Treatment Recommendations: Balance training, Functional mobility training, Endurance training, Equipment evaluation, education, & procurement, Patient/Caregiver education & training, Safety education & training, Pain management, Self-Care / ADL, Home management training, Strengthening    AM-PAC Daily Activities Inpatient  AM-PAC Daily Activity - Inpatient   How much help is needed for putting on and taking off regular lower body clothing?: A Little  How much help is needed for bathing (which includes washing, rinsing, drying)?: A Little  How much help is needed for toileting (which includes using toilet, bedpan, or urinal)?: A Little  How much help is needed for putting on and taking off regular upper body clothing?: A Little  How much help is needed for taking care of personal grooming?: A Little  How much help for eating meals?: A Little  AM-[facility] Inpatient Daily Activity Raw Score: 18  AM-PAC Inpatient ADL T-Scale Score : 38.66  ADL Inpatient CMS 0-100% Score: 46.65  ADL Inpatient CMS G-Code Modifier : CK    Minutes  OT Individual Minutes  Time In: 1605  Time Out: 1640  Minutes: 35  Time Code Minutes   Timed Code Treatment Minutes: 30 Minutes    Electronically signed by ANA Chang on 12/11/24 at 6:00 PM EST

## 2024-12-11 NOTE — CARE COORDINATION
Case Management Assessment  Initial Evaluation    Date/Time of Evaluation: 12/11/2024 2:33 PM  Assessment Completed by: Kristina Hidalgo RN    If patient is discharged prior to next notation, then this note serves as note for discharge by case management.    Patient Name: Toshia South                   YOB: 1945  Diagnosis: Acute on chronic respiratory failure [J96.20]  Respiratory failure [J96.90]                   Date / Time: 12/10/2024  3:46 PM    Patient Admission Status: Inpatient   Readmission Risk (Low < 19, Mod (19-27), High > 27): Readmission Risk Score: 29.8    Current PCP: Jong Moraes MD  PCP verified by CM? Yes    Chart Reviewed: Yes      History Provided by: Patient, Spouse  Patient Orientation: Alert and Oriented, Person, Place, Situation    Patient Cognition: Alert    Hospitalization in the last 30 days (Readmission):  No    If yes, Readmission Assessment in  Navigator will be completed.    Advance Directives:      Code Status: Full Code   Patient's Primary Decision Maker is: Legal Next of Kin    Primary Decision Maker (Active): BrannonJerrell - Spouse - [phone]    Secondary Decision Maker: Brannon,Lucio - Child - [phone]    Supplemental (Other) Decision Maker: Jose Eduardo South - Micha - [phone]    Discharge Planning:    Patient lives with: Spouse/Significant Other Type of Home: House  Primary Care Giver: Spouse  Patient Support Systems include: Spouse/Significant Other, Children, Family Members   Current Financial resources: Medicare  Current community resources:    Current services prior to admission: Durable Medical Equipment            Current DME: Cane, Walker, Bipap, Oxygen Therapy (Comment), Wheelchair            Type of Home Care services:  PT, OT, Nursing Services    ADLS  Prior functional level: Assistance with the following:  Current functional level: Assistance with the following:    PT AM-PAC:   /24  OT AM-PAC:   /24    Family can provide

## 2024-12-11 NOTE — PROGRESS NOTES
Writer requested by nurse to provide support to pt after being extubated. Nurse said pt was anxious.    Pt was suctioning her mouth when writer entered room. She did not appear to be in a state to visit but she asked writer to stay with her. Writer held her hand and spoke with her briefly. She said she hoped her  would arrive soon. She described her  and four children as wonderful. Pt seemed to be less anxious when writer left.     Spiritual health team will remain available for spiritual and emotional support.     Electronically signed by Chaplain Angeles, on 12/11/2024 at 10:36 AM.  Spiritual Health  [facility]  [phone]

## 2024-12-11 NOTE — PLAN OF CARE
Problem: Respiratory - Adult  Goal: Achieves optimal ventilation and oxygenation  Flowsheets (Taken 12/10/2024 2012)  Achieves optimal ventilation and oxygenation:   Assess for changes in respiratory status   Assess for changes in mentation and behavior   Position to facilitate oxygenation and minimize respiratory effort   Oxygen supplementation based on oxygen saturation or arterial blood gases   Initiate smoking cessation protocol as indicated   Encourage broncho-pulmonary hygiene including cough, deep breathe, incentive spirometry   Assess the need for suctioning and aspirate as needed   Assess and instruct to report shortness of breath or any respiratory difficulty   Respiratory therapy support as indicated

## 2024-12-11 NOTE — H&P
H&P for this patient logged incorrectly as a progress note on 12/10/24 at 3:54pm  Please see note at that time for H&P

## 2024-12-11 NOTE — PROGRESS NOTES
[facility]   Pharmacy Pharmacokinetic Monitoring Service - Vancomycin     Toshia South is a 79 y.o. female starting on vancomycin therapy for CAP. Pharmacy consulted by Hannah Drake  for monitoring and adjustment.    Target Concentration: Goal AUC/ROBERTO 400-600 mg*hr/L    Additional Antimicrobials: Zosyn    Pertinent Laboratory Values:   Wt Readings from Last 1 Encounters:   12/10/24 69.9 kg (154 lb 1.6 oz)     Temp Readings from Last 1 Encounters:   12/10/24 99.5 °F (37.5 °C) (Oral)     Estimated Creatinine Clearance: 70 mL/min (based on SCr of 0.6 mg/dL).  Recent Labs     12/10/24  0912 12/10/24  1711   CREATININE 0.6 0.6   BUN 15 15   WBC 11.9* 15.3*     Procalcitonin: 0.38    MRSA Nasal Swab: was ordered by provider, awaiting results.    Plan:  Dosing recommendations based on Bayesian software  Start vancomycin 750 mg q12h  Anticipated AUC of 430 and trough concentration of 13.7 at steady state  Renal labs as indicated   Vancomycin concentration ordered for TBD  Pharmacy will continue to monitor patient and adjust therapy as indicated    Thank you for the consult,  Joe Flanagan RPH  12/10/2024 7:05 PM

## 2024-12-11 NOTE — PLAN OF CARE
Problem: Safety - Medical Restraint  Goal: Remains free of injury from restraints (Restraint for Interference with Medical Device)  Description: INTERVENTIONS:  1. Determine that other, less restrictive measures have been tried or would not be effective before applying the restraint  2. Evaluate the patient's condition at the time of restraint application  3. Inform patient/family regarding the reason for restraint  4. Q2H: Monitor safety, psychosocial status, comfort, nutrition and hydration  12/11/2024 0517 by Yenni Flores RN  Outcome: Progressing  Flowsheets (Taken 12/10/2024 1800 by Remigio Romero RN)  Remains free of injury from restraints (restraint for interference with medical device): Every 2 hours: Monitor safety, psychosocial status, comfort, nutrition and hydration     Problem: Chronic Conditions and Co-morbidities  Goal: Patient's chronic conditions and co-morbidity symptoms are monitored and maintained or improved  12/11/2024 0517 by Yenni Flores RN  Outcome: Progressing     Problem: Discharge Planning  Goal: Discharge to home or other facility with appropriate resources  12/11/2024 0517 by Yenni Flores RN  Outcome: Progressing     Problem: Pain  Goal: Verbalizes/displays adequate comfort level or baseline comfort level  12/11/2024 0517 by Yenni Flores RN  Outcome: Progressing     Problem: Skin/Tissue Integrity  Goal: Absence of new skin breakdown  Description: 1.  Monitor for areas of redness and/or skin breakdown  2.  Assess vascular access sites hourly  3.  Every 4-6 hours minimum:  Change oxygen saturation probe site  4.  Every 4-6 hours:  If on nasal continuous positive airway pressure, respiratory therapy assess nares and determine need for appliance change or resting period.  12/11/2024 0517 by Yenni Flores RN  Outcome: Progressing     Problem: Safety - Adult  Goal: Free from fall injury  12/11/2024 0517 by Yenni Flores RN  Outcome: Progressing     Problem: ABCDS Injury

## 2024-12-11 NOTE — PLAN OF CARE
Problem: Safety - Medical Restraint  Goal: Remains free of injury from restraints (Restraint for Interference with Medical Device)  Description: INTERVENTIONS:  1. Determine that other, less restrictive measures have been tried or would not be effective before applying the restraint  2. Evaluate the patient's condition at the time of restraint application  3. Inform patient/family regarding the reason for restraint  4. Q2H: Monitor safety, psychosocial status, comfort, nutrition and hydration  12/11/2024 0849 by Remigio Romero RN  Outcome: Progressing  Flowsheets (Taken 12/11/2024 0800)  Remains free of injury from restraints (restraint for interference with medical device):   Determine that other, less restrictive measures have been tried or would not be effective before applying the restraint   Evaluate the patient's condition at the time of restraint application   Inform patient/family regarding the reason for restraint   Every 2 hours: Monitor safety, psychosocial status, comfort, nutrition and hydration  12/11/2024 0517 by Yenni Flores RN  Outcome: Progressing  Flowsheets (Taken 12/10/2024 1800 by Remigio Romero RN)  Remains free of injury from restraints (restraint for interference with medical device): Every 2 hours: Monitor safety, psychosocial status, comfort, nutrition and hydration     Problem: Chronic Conditions and Co-morbidities  Goal: Patient's chronic conditions and co-morbidity symptoms are monitored and maintained or improved  12/11/2024 0849 by Remigio Romero RN  Outcome: Progressing  Flowsheets (Taken 12/11/2024 0800)  Care Plan - Patient's Chronic Conditions and Co-Morbidity Symptoms are Monitored and Maintained or Improved:   Monitor and assess patient's chronic conditions and comorbid symptoms for stability, deterioration, or improvement   Update acute care plan with appropriate goals if chronic or comorbid symptoms are exacerbated and prevent overall improvement and  RN  Outcome: Progressing  Flowsheets (Taken 12/11/2024 0848)  Absence of Physical Injury: Implement safety measures based on patient assessment  12/11/2024 0517 by Yenni Flores RN  Outcome: Progressing     Problem: Respiratory - Adult  Goal: Achieves optimal ventilation and oxygenation  12/11/2024 0849 by Remigio Romero RN  Outcome: Progressing  12/11/2024 0517 by Yenni Flores RN  Outcome: Progressing  12/10/2024 2012 by Elisabeth Dowd RCP  Flowsheets (Taken 12/10/2024 2012)  Achieves optimal ventilation and oxygenation:   Assess for changes in respiratory status   Assess for changes in mentation and behavior   Position to facilitate oxygenation and minimize respiratory effort   Oxygen supplementation based on oxygen saturation or arterial blood gases   Initiate smoking cessation protocol as indicated   Encourage broncho-pulmonary hygiene including cough, deep breathe, incentive spirometry   Assess the need for suctioning and aspirate as needed   Assess and instruct to report shortness of breath or any respiratory difficulty   Respiratory therapy support as indicated

## 2024-12-11 NOTE — PROGRESS NOTES
SPEECH LANGUAGE PATHOLOGY  Facility/Department: [facility] 3- [facility]   CLINICAL BEDSIDE SWALLOW EVALUATION    NAME: Toshia South  : 1945  MRN: [id number]    ADMISSION DATE: 12/10/2024  ADMITTING DIAGNOSIS: has Hypothyroidism; Major depressive disorder; Chronic midline low back pain without sciatica; Iron deficiency anemia; COPD (chronic obstructive pulmonary disease) (HCC); Biventricular congestive heart failure (HCC); Anemia; PAF (paroxysmal atrial fibrillation) (HCC); IPF (idiopathic pulmonary fibrosis) (HCC); History of subdural hematoma; Fall as cause of accidental injury in home as place of occurrence, initial encounter; GERD (gastroesophageal reflux disease); Oxygen dependent; Acute on chronic respiratory failure with hypoxia; Community acquired bacterial pneumonia; COPD exacerbation (HCC); Biventricular heart failure (HCC); Citrobacter infection; Dysphagia, oropharyngeal; Zenker diverticulum; Acute on chronic respiratory failure with hypoxia and hypercapnia; Bronchiectasis (HCC); Chronic pain; Malnutrition of mild degree (HCC); Multifocal pneumonia; Secondary hypercoagulable state (HCC); Moderate malnutrition (HCC); Acute respiratory failure with hypoxia; Acute on chronic diastolic CHF (congestive heart failure) (HCC); Acute on chronic respiratory failure; and Respiratory failure on their problem list.    Recent Chest Xray: 12/10/2024  MPRESSION:  1. Endotracheal tube approximately 1.8 cm above the alejandro.  2. Bilateral pulmonary infiltrates redemonstrated and slightly denser in the  right chest.    Date of Eval: 2024  Evaluating Therapist: HILLARY Duran    Current Diet level:  Current Diet : NPO  Current Liquid Diet : NPO    Primary Complaint  History was obtained from chart review.       Toshia South is a 79 y.o. with history of bronchiectasis, pulmonary fibrosis, chronic hypoxic respiratory failure on 4 L, recurrent multifocal pneumonia, paroxysmal atrial fibrillation on Rivoraxaban,  Acceptance No impairment   Bolus Formation/Control Impaired   Type of Impairment Mastication   Propulsion Delayed; Discoordination   Oral Residue 10-50% of bolus; Lingual   Initiation of Swallow Delayed    Laryngeal Elevation Decreased   Aspiration Signs/Symptoms Throat clear; Delayed cough/throat clear   Pharyngeal Phase Characteristics Suspected pharyngeal residue; Poor endurance       Prognosis  Prognosis: Fair  Prognosis Considerations: Previous Level of Function;Age;Participation Level  Consulted and agree with results and recommendations: Patient;RN  RN Name: Remigio Scherer  Patient Education: resulst and recommendations  Patient Education Response: Verbalizes understanding             Therapy Time  SLP Individual Minutes  Time In: 1123  Time Out: 1141  Minutes: 18          Tucker Rivera M.S., CCC-SLP   12/11/2024 1:09 PM

## 2024-12-11 NOTE — PROGRESS NOTES
INTENSIVE CARE UNIT  Resident Physician Progress Note    Patient - Toshia South  Date of Admission -  12/10/2024  3:46 PM  Date of Evaluation -  12/11/2024  Room and Bed Number -  3025/3025-01   Hospital Day - 1      SUBJECTIVE:     OVERNIGHT EVENTS:     - Patient's troponins were uptrending(53-->62-->72). On heparin drip(she was all ready on heparin drip for pAfib). Cardiology was consulted.  - Received multiple doses of ativan due to anxiety    TODAY,    Patient is following commands. SBT pending today .    Patient is hemodynamically stable on 1 Levo.   Mech ventilation 350/20/45/5. BG pending. On fentanyl 100.  On 75 ml/hr NS. UO - 945.    On Vanc + Zosyn  On Heparin drip      Labs this am    - Labs this am Na 140, potassium 3.7, bicarb 28, Cr 0.4  - Cultures so far negative. MRSA positive.  - UA indicative of UTI    Imaging    Consult  (1) Cardiology - Awaiting recs  (2) Plan to extubate if patient tolerates SBT  (3) Plan to stop fluids and resume Bumex.      HOSPITAL COURSE:     History was obtained from chart review.       Toshia South is a 79 y.o. with history of bronchiectasis, pulmonary fibrosis, chronic hypoxic respiratory failure on 4 L, recurrent multifocal pneumonia, paroxysmal atrial fibrillation on Rivoraxaban, Diastolic HF who was found unresponsive.     As per chart review, patient had been experiencing shortness of breath, fever, chills, cough and was found unresponsive, slumped over her chair and was off oxygen. Patient was intubated at [place]. She was hypotensive and did not respond to fluids and hence was started on levophed.     Initial labs revealed sodium 140, potassium 3.8, bicarb 34, creatinine 0.6, WBC 11.9. Trops uptrended to 32-->53-->55. CT chest revealed Multifocal consolidation most compatible with pneumonia is similar to before in Sept.     [facility].     On arrival, patient was intubated and sedated on versed.

## 2024-12-11 NOTE — CONSULTS
[facility]    Consult / H&P               Today's Date: 12/11/2024  Patient Name: Toshia South  Date of admission: 12/10/2024  3:46 PM  Patient's age: 79 y.o., 1945  Admission Dx: Acute on chronic respiratory failure [J96.20]  Respiratory failure [J96.90]    Reason for Consult: Rising Troponin    Requesting Physician: Marin Coreas MD    CHIEF COMPLAINT: unresponsive    History Obtained From:  patient, EMR    HISTORY OF PRESENT ILLNESS:      The patient is a 79 y.o. female who presents to [facility] after she was found unresponsive. Per chart review, patient had SOB, fevers, chills, cough and was found unresponsive slumped over her chair without her home O2. At [place] she was intubated, she was hypotensive and unresponsive to fluids and was started on levophed. She was then transferred to [facility] MICU for higher level of care.     This morning she is intubated and on sedation, is arousable. Remains on a touch of levophed. Is on heparin gtt. She was able to shake her head no when asked if she was having any chest pain.    Sig Labs and Imaging:  BUN 17, Cr 0.4, WBC 11.9-> 15.3->10.7, Hgb 10.4, PLTs 179  Troponin 53-> 55-> 62-> 72  CT Chest: Multifocal consolidation similar to 9/20 and 9/24 CT, most compatible with PNA, Rt small pleural effusion, no PE, segmental and subsegmental branch evaluation limited by motion.  12/10 EKG: NSR with prolonged QTc 497    2/23/2024 TTE: LVEF 65-70%, mod septal thickening 1.4cm with mildly increased LVOT gradient, grade 1 LVDD, mild-mod TR, RVSP 49mmHg, mildly dilated LA    10/26/2020 Cardiac Cath: Mild CAD    PMHx: bronchiectasis, pulm fibrosis, chronic hypoxic respiratory failure on 4L home O2, recurrent multifocal PNA, pAFib on Xarelto, and chronic diastolic CHF.    Past Medical History:   has a past medical history of A-fib (HCC), Anxiety, Atrial fibrillation (HCC), Biventricular congestive heart failure (HCC), Bronchiectasis with acute  exacerbation (HCC), CAD (coronary artery disease), Chronic pain syndrome, COPD (chronic obstructive pulmonary disease) (HCC), Depression, GERD (gastroesophageal reflux disease), Hypothyroidism, Impaired fasting glucose, Iron deficiency anemia, Osteoporosis, Pulmonary fibrosis (HCC), Sleep apnea, and Subdural hematoma.    Past Surgical History:   has a past surgical history that includes Hysterectomy (08/20/1991); Carpal tunnel release (Right, 12/17/1999); Total knee arthroplasty (Right, 03/19/2021); fracture surgery (Left, 12/20/2018); Wrist fracture surgery (Left, 12/20/2018); lumbar discectomy (08/30/1993); Lumbar disc surgery (02/15/1994); back surgery (09/09/1998); back surgery (08/04/1999); Carpal tunnel release (Left, 11/24/1999); Neck surgery (05/03/2002); Carpal tunnel release (Right, 12/30/2002); Carpal tunnel release (Left, 12/17/2003); back surgery (10/23/2003); back surgery (10/23/2003); Cervical disc surgery (10/25/2004); Cervical disc surgery (12/22/2004); Neck surgery (12/09/2005); Toe amputation (10/08/2006); Toe amputation (03/07/2008); lumbar laminectomy (06/09/2008); Toe amputation (10/03/2008); Humerus fracture surgery (Right, 10/03/2010); Knee arthroscopy (Right, 06/02/2011); back surgery (09/11/2017); knee surgery (Right, 02/04/2016); Wrist fracture surgery (Left, 12/20/2018); Wrist surgery (Left, 07/02/2019); craniotomy (Left, 8/23/2022); and Nerve Block (N/A, 8/29/2024).     Home Medications:    Prior to Admission medications    Medication Sig Start Date End Date Taking? Authorizing Provider   benzonatate (TESSALON) 100 MG capsule Take 2 capsules by mouth 3 times daily as needed for Cough 11/4/24   Jong Moraes MD   XARELTO 20 MG TABS tablet TAKE 1 TABLET BY MOUTH DAILY  WITH BREAKFAST 10/7/24   Yris Coyne PA-C   alendronate (FOSAMAX) 70 MG tablet TAKE ON SUNDAY 1 TABLET BY MOUTH WEEKLY WITH 8 OZ OF PLAIN WATER  30 MINUTES BEFORE FIRST FOOD,  DRINK OR MEDS. STAY UPRIGHT FOR

## 2024-12-11 NOTE — CARE COORDINATION
Transitional planning note: spoke with Jayda in admissions at [facility] and they are able to accept patient for home care.

## 2024-12-11 NOTE — PROGRESS NOTES
12/11/24 0857   Vent Information   Vent Mode (S)  CPAP/PS   Ventilator Settings   PEEP/CPAP (cmH2O) (S)  5   FiO2  (S)  40 %   Pressure Support (cm H2O) (S)  6 cm H2O   Vent Patient Data (Readings)   Vt (Measured) (S)  530 mL     Wean trial started 0856.

## 2024-12-12 ENCOUNTER — APPOINTMENT (OUTPATIENT)
Dept: GENERAL RADIOLOGY | Age: 79
End: 2024-12-12
Attending: INTERNAL MEDICINE
Payer: MEDICARE

## 2024-12-12 LAB
ANION GAP SERPL CALCULATED.3IONS-SCNC: 8 MMOL/L (ref 9–16)
BASOPHILS # BLD: <0.03 K/UL (ref 0–0.2)
BASOPHILS NFR BLD: 0 % (ref 0–2)
BUN SERPL-MCNC: 15 MG/DL (ref 8–23)
CALCIUM SERPL-MCNC: 8.8 MG/DL (ref 8.6–10.4)
CHLORIDE SERPL-SCNC: 105 MMOL/L (ref 98–107)
CO2 SERPL-SCNC: 28 MMOL/L (ref 20–31)
CREAT SERPL-MCNC: 0.4 MG/DL (ref 0.6–0.9)
EOSINOPHIL # BLD: <0.03 K/UL (ref 0–0.44)
EOSINOPHILS RELATIVE PERCENT: 0 % (ref 1–4)
ERYTHROCYTE [DISTWIDTH] IN BLOOD BY AUTOMATED COUNT: 14.6 % (ref 11.8–14.4)
GFR, ESTIMATED: >90 ML/MIN/1.73M2
GLUCOSE BLD-MCNC: 112 MG/DL (ref 65–105)
GLUCOSE BLD-MCNC: 123 MG/DL (ref 65–105)
GLUCOSE BLD-MCNC: 128 MG/DL (ref 65–105)
GLUCOSE BLD-MCNC: 131 MG/DL (ref 65–105)
GLUCOSE BLD-MCNC: 161 MG/DL (ref 65–105)
GLUCOSE SERPL-MCNC: 130 MG/DL (ref 74–99)
HCT VFR BLD AUTO: 35.4 % (ref 36.3–47.1)
HGB BLD-MCNC: 11.2 G/DL (ref 11.9–15.1)
IMM GRANULOCYTES # BLD AUTO: 0.04 K/UL (ref 0–0.3)
IMM GRANULOCYTES NFR BLD: 1 %
LYMPHOCYTES NFR BLD: 0.7 K/UL (ref 1.1–3.7)
LYMPHOCYTES RELATIVE PERCENT: 8 % (ref 24–43)
MAGNESIUM SERPL-MCNC: 2.1 MG/DL (ref 1.6–2.4)
MCH RBC QN AUTO: 30.4 PG (ref 25.2–33.5)
MCHC RBC AUTO-ENTMCNC: 31.6 G/DL (ref 28.4–34.8)
MCV RBC AUTO: 96.2 FL (ref 82.6–102.9)
MICROORGANISM SPEC CULT: ABNORMAL
MICROORGANISM/AGENT SPEC: ABNORMAL
MONOCYTES NFR BLD: 0.41 K/UL (ref 0.1–1.2)
MONOCYTES NFR BLD: 5 % (ref 3–12)
NEUTROPHILS NFR BLD: 86 % (ref 36–65)
NEUTS SEG NFR BLD: 7.41 K/UL (ref 1.5–8.1)
NRBC BLD-RTO: 0 PER 100 WBC
PARTIAL THROMBOPLASTIN TIME: 80.6 SEC (ref 23–36.5)
PLATELET # BLD AUTO: 193 K/UL (ref 138–453)
PMV BLD AUTO: 9.9 FL (ref 8.1–13.5)
POTASSIUM SERPL-SCNC: 3.5 MMOL/L (ref 3.7–5.3)
PROCALCITONIN SERPL-MCNC: 0.08 NG/ML (ref 0–0.09)
RBC # BLD AUTO: 3.68 M/UL (ref 3.95–5.11)
RBC # BLD: ABNORMAL 10*6/UL
SODIUM SERPL-SCNC: 141 MMOL/L (ref 136–145)
SPECIMEN DESCRIPTION: ABNORMAL
VANCOMYCIN TROUGH SERPL-MCNC: 6.9 UG/ML (ref 10–20)
WBC OTHER # BLD: 8.6 K/UL (ref 3.5–11.3)

## 2024-12-12 PROCEDURE — 6360000002 HC RX W HCPCS

## 2024-12-12 PROCEDURE — 2500000003 HC RX 250 WO HCPCS

## 2024-12-12 PROCEDURE — 99233 SBSQ HOSP IP/OBS HIGH 50: CPT | Performed by: INTERNAL MEDICINE

## 2024-12-12 PROCEDURE — 82947 ASSAY GLUCOSE BLOOD QUANT: CPT

## 2024-12-12 PROCEDURE — 2580000003 HC RX 258

## 2024-12-12 PROCEDURE — 83735 ASSAY OF MAGNESIUM: CPT

## 2024-12-12 PROCEDURE — 85025 COMPLETE CBC W/AUTO DIFF WBC: CPT

## 2024-12-12 PROCEDURE — 1200000000 HC SEMI PRIVATE

## 2024-12-12 PROCEDURE — 80048 BASIC METABOLIC PNL TOTAL CA: CPT

## 2024-12-12 PROCEDURE — 94761 N-INVAS EAR/PLS OXIMETRY MLT: CPT

## 2024-12-12 PROCEDURE — 6370000000 HC RX 637 (ALT 250 FOR IP)

## 2024-12-12 PROCEDURE — 99291 CRITICAL CARE FIRST HOUR: CPT | Performed by: INTERNAL MEDICINE

## 2024-12-12 PROCEDURE — 84145 PROCALCITONIN (PCT): CPT

## 2024-12-12 PROCEDURE — 94640 AIRWAY INHALATION TREATMENT: CPT

## 2024-12-12 PROCEDURE — 36415 COLL VENOUS BLD VENIPUNCTURE: CPT

## 2024-12-12 PROCEDURE — 93005 ELECTROCARDIOGRAM TRACING: CPT

## 2024-12-12 PROCEDURE — 74230 X-RAY XM SWLNG FUNCJ C+: CPT

## 2024-12-12 PROCEDURE — 2700000000 HC OXYGEN THERAPY PER DAY

## 2024-12-12 PROCEDURE — 80202 ASSAY OF VANCOMYCIN: CPT

## 2024-12-12 PROCEDURE — 85730 THROMBOPLASTIN TIME PARTIAL: CPT

## 2024-12-12 PROCEDURE — 92611 MOTION FLUOROSCOPY/SWALLOW: CPT

## 2024-12-12 RX ORDER — TRAZODONE HYDROCHLORIDE 50 MG/1
200 TABLET, FILM COATED ORAL NIGHTLY
Status: DISCONTINUED | OUTPATIENT
Start: 2024-12-12 | End: 2024-12-14 | Stop reason: HOSPADM

## 2024-12-12 RX ORDER — HYDROCODONE BITARTRATE AND ACETAMINOPHEN 5; 325 MG/1; MG/1
1 TABLET ORAL EVERY 6 HOURS PRN
Status: DISCONTINUED | OUTPATIENT
Start: 2024-12-12 | End: 2024-12-14 | Stop reason: HOSPADM

## 2024-12-12 RX ORDER — DILTIAZEM HYDROCHLORIDE 120 MG/1
120 CAPSULE, COATED, EXTENDED RELEASE ORAL DAILY
Status: DISCONTINUED | OUTPATIENT
Start: 2024-12-13 | End: 2024-12-14 | Stop reason: HOSPADM

## 2024-12-12 RX ORDER — IPRATROPIUM BROMIDE AND ALBUTEROL SULFATE 2.5; .5 MG/3ML; MG/3ML
1 SOLUTION RESPIRATORY (INHALATION)
Status: DISCONTINUED | OUTPATIENT
Start: 2024-12-12 | End: 2024-12-14 | Stop reason: HOSPADM

## 2024-12-12 RX ORDER — HYDRALAZINE HYDROCHLORIDE 20 MG/ML
5 INJECTION INTRAMUSCULAR; INTRAVENOUS ONCE
Status: COMPLETED | OUTPATIENT
Start: 2024-12-12 | End: 2024-12-12

## 2024-12-12 RX ORDER — POTASSIUM CHLORIDE 7.45 MG/ML
10 INJECTION INTRAVENOUS
Status: ACTIVE | OUTPATIENT
Start: 2024-12-12 | End: 2024-12-12

## 2024-12-12 RX ORDER — HEPARIN SODIUM 1000 [USP'U]/ML
60 INJECTION, SOLUTION INTRAVENOUS; SUBCUTANEOUS ONCE
Status: CANCELLED | OUTPATIENT
Start: 2024-12-12 | End: 2024-12-12

## 2024-12-12 RX ORDER — HEPARIN SODIUM 1000 [USP'U]/ML
30 INJECTION, SOLUTION INTRAVENOUS; SUBCUTANEOUS PRN
Status: CANCELLED | OUTPATIENT
Start: 2024-12-12

## 2024-12-12 RX ORDER — HEPARIN SODIUM 1000 [USP'U]/ML
60 INJECTION, SOLUTION INTRAVENOUS; SUBCUTANEOUS PRN
Status: CANCELLED | OUTPATIENT
Start: 2024-12-12

## 2024-12-12 RX ORDER — DILTIAZEM HYDROCHLORIDE 5 MG/ML
10 INJECTION INTRAVENOUS EVERY 8 HOURS
Status: COMPLETED | OUTPATIENT
Start: 2024-12-12 | End: 2024-12-12

## 2024-12-12 RX ORDER — PROMETHAZINE HYDROCHLORIDE 25 MG/ML
12.5 INJECTION, SOLUTION INTRAMUSCULAR; INTRAVENOUS ONCE
Status: COMPLETED | OUTPATIENT
Start: 2024-12-12 | End: 2024-12-12

## 2024-12-12 RX ORDER — HEPARIN SODIUM 10000 [USP'U]/100ML
5-30 INJECTION, SOLUTION INTRAVENOUS CONTINUOUS
Status: CANCELLED | OUTPATIENT
Start: 2024-12-12

## 2024-12-12 RX ADMIN — PROMETHAZINE HYDROCHLORIDE 12.5 MG: 25 INJECTION INTRAMUSCULAR; INTRAVENOUS at 17:14

## 2024-12-12 RX ADMIN — SODIUM CHLORIDE: 9 INJECTION, SOLUTION INTRAVENOUS at 01:29

## 2024-12-12 RX ADMIN — IPRATROPIUM BROMIDE AND ALBUTEROL SULFATE 1 DOSE: 2.5; .5 SOLUTION RESPIRATORY (INHALATION) at 11:27

## 2024-12-12 RX ADMIN — PIPERACILLIN AND TAZOBACTAM 3375 MG: 3; .375 INJECTION, POWDER, LYOPHILIZED, FOR SOLUTION INTRAVENOUS at 01:30

## 2024-12-12 RX ADMIN — DILTIAZEM HYDROCHLORIDE 10 MG: 5 INJECTION, SOLUTION INTRAVENOUS at 05:49

## 2024-12-12 RX ADMIN — SODIUM CHLORIDE 40 MG: 9 INJECTION INTRAMUSCULAR; INTRAVENOUS; SUBCUTANEOUS at 08:33

## 2024-12-12 RX ADMIN — ONDANSETRON 4 MG: 2 INJECTION INTRAMUSCULAR; INTRAVENOUS at 16:01

## 2024-12-12 RX ADMIN — SODIUM CHLORIDE, PRESERVATIVE FREE 10 ML: 5 INJECTION INTRAVENOUS at 08:34

## 2024-12-12 RX ADMIN — DEXMEDETOMIDINE HYDROCHLORIDE 0.6 MCG/KG/HR: 400 INJECTION, SOLUTION INTRAVENOUS at 08:19

## 2024-12-12 RX ADMIN — POTASSIUM CHLORIDE 10 MEQ: 7.46 INJECTION, SOLUTION INTRAVENOUS at 10:02

## 2024-12-12 RX ADMIN — HYDROMORPHONE HYDROCHLORIDE 0.5 MG: 1 INJECTION, SOLUTION INTRAMUSCULAR; INTRAVENOUS; SUBCUTANEOUS at 21:19

## 2024-12-12 RX ADMIN — ATORVASTATIN CALCIUM 40 MG: 40 TABLET, FILM COATED ORAL at 20:51

## 2024-12-12 RX ADMIN — SODIUM CHLORIDE, PRESERVATIVE FREE 10 ML: 5 INJECTION INTRAVENOUS at 20:51

## 2024-12-12 RX ADMIN — TRAZODONE HYDROCHLORIDE 200 MG: 50 TABLET ORAL at 20:51

## 2024-12-12 RX ADMIN — VANCOMYCIN HYDROCHLORIDE 750 MG: 750 INJECTION, POWDER, LYOPHILIZED, FOR SOLUTION INTRAVENOUS at 23:06

## 2024-12-12 RX ADMIN — BUMETANIDE 2 MG: 0.25 INJECTION INTRAMUSCULAR; INTRAVENOUS at 08:34

## 2024-12-12 RX ADMIN — HYDROCODONE BITARTRATE AND ACETAMINOPHEN 1 TABLET: 5; 325 TABLET ORAL at 15:53

## 2024-12-12 RX ADMIN — POTASSIUM CHLORIDE 10 MEQ: 7.46 INJECTION, SOLUTION INTRAVENOUS at 05:58

## 2024-12-12 RX ADMIN — WATER 40 MG: 1 INJECTION INTRAMUSCULAR; INTRAVENOUS; SUBCUTANEOUS at 02:20

## 2024-12-12 RX ADMIN — HEPARIN SODIUM 24 UNITS/KG/HR: 10000 INJECTION, SOLUTION INTRAVENOUS at 10:45

## 2024-12-12 RX ADMIN — POTASSIUM CHLORIDE 10 MEQ: 7.46 INJECTION, SOLUTION INTRAVENOUS at 08:26

## 2024-12-12 RX ADMIN — ONDANSETRON 4 MG: 2 INJECTION INTRAMUSCULAR; INTRAVENOUS at 20:32

## 2024-12-12 RX ADMIN — POTASSIUM CHLORIDE 10 MEQ: 7.46 INJECTION, SOLUTION INTRAVENOUS at 11:05

## 2024-12-12 RX ADMIN — SERTRALINE HYDROCHLORIDE 50 MG: 50 TABLET ORAL at 20:51

## 2024-12-12 RX ADMIN — VANCOMYCIN HYDROCHLORIDE 750 MG: 750 INJECTION, POWDER, LYOPHILIZED, FOR SOLUTION INTRAVENOUS at 10:43

## 2024-12-12 RX ADMIN — HYDRALAZINE HYDROCHLORIDE 5 MG: 20 INJECTION INTRAMUSCULAR; INTRAVENOUS at 17:48

## 2024-12-12 RX ADMIN — RIVAROXABAN 20 MG: 20 TABLET, FILM COATED ORAL at 17:49

## 2024-12-12 RX ADMIN — WATER 40 MG: 1 INJECTION INTRAMUSCULAR; INTRAVENOUS; SUBCUTANEOUS at 15:56

## 2024-12-12 RX ADMIN — DILTIAZEM HYDROCHLORIDE 10 MG: 5 INJECTION, SOLUTION INTRAVENOUS at 21:46

## 2024-12-12 RX ADMIN — PIPERACILLIN AND TAZOBACTAM 3375 MG: 3; .375 INJECTION, POWDER, LYOPHILIZED, FOR SOLUTION INTRAVENOUS at 21:40

## 2024-12-12 RX ADMIN — LABETALOL HYDROCHLORIDE 10 MG: 5 INJECTION, SOLUTION INTRAVENOUS at 16:33

## 2024-12-12 RX ADMIN — PIPERACILLIN AND TAZOBACTAM 3375 MG: 3; .375 INJECTION, POWDER, LYOPHILIZED, FOR SOLUTION INTRAVENOUS at 11:57

## 2024-12-12 RX ADMIN — ONDANSETRON 4 MG: 2 INJECTION INTRAMUSCULAR; INTRAVENOUS at 09:08

## 2024-12-12 RX ADMIN — DILTIAZEM HYDROCHLORIDE 10 MG: 5 INJECTION, SOLUTION INTRAVENOUS at 14:05

## 2024-12-12 RX ADMIN — IPRATROPIUM BROMIDE AND ALBUTEROL SULFATE 1 DOSE: 2.5; .5 SOLUTION RESPIRATORY (INHALATION) at 07:43

## 2024-12-12 ASSESSMENT — PAIN SCALES - GENERAL
PAINLEVEL_OUTOF10: 10
PAINLEVEL_OUTOF10: 10
PAINLEVEL_OUTOF10: 9
PAINLEVEL_OUTOF10: 10
PAINLEVEL_OUTOF10: 8
PAINLEVEL_OUTOF10: 9
PAINLEVEL_OUTOF10: 8
PAINLEVEL_OUTOF10: 10
PAINLEVEL_OUTOF10: 8

## 2024-12-12 ASSESSMENT — PAIN DESCRIPTION - ORIENTATION
ORIENTATION: LEFT

## 2024-12-12 ASSESSMENT — PAIN DESCRIPTION - LOCATION
LOCATION: LEG
LOCATION: HIP

## 2024-12-12 ASSESSMENT — PAIN DESCRIPTION - DESCRIPTORS
DESCRIPTORS: ACHING;DISCOMFORT
DESCRIPTORS: ACHING;DISCOMFORT
DESCRIPTORS: ACHING;SHARP;THROBBING

## 2024-12-12 ASSESSMENT — PAIN DESCRIPTION - PAIN TYPE
TYPE: CHRONIC PAIN
TYPE: CHRONIC PAIN

## 2024-12-12 NOTE — CARE COORDINATION
Transitional planning. PT/OT/SLP ordered.  4L NC, PT/OT/SLP ordered. Plan is home w/ [facility], Accepted. Has walker, cane, BiPAP, BSC and Home O2  @ 4L NC through [facility]. Family will transport home.     Spoke to pt's , Jerrell plans remain home w/ [facility]. They are refusing SNF. They will bring a couple of Home O2 travel tanks for transport home.

## 2024-12-12 NOTE — PROCEDURES
assess for aspiration, and to make recommendations regarding safe dietary consistencies, effective compensatory strategies, and safe eating environment.       Onset of problem:      Toshia South is a 79 y.o. female who presents with shortness of breath, found slumped over chair, unresponsive, off her oxygen, ems called and patient was 65% on RA, was placed on oxygen, and started on NRB, and also got solumedrol and aerosols by EMS  H/o chornic resp failure, well-known to the emergency room.  Does follow with pulmonology he, has history of bronchiectasis, pulmonary fibrosis.  Chronically on 4 L oxygen.  Admitted in November for pneumonia.  Patient anticoagulated on Xarelto due to history of A-fib.  Per report patient also febrile with worsening cough at home.  Patient upon arrival was placed on high flow humidified nasal cannula.  Does have diffuse audible wheezing, but is able to communicate she is feeling better.  She still has elevated heart rate and high respiratory rate.          Behavior/Cognition/Vision/Hearing:  Behavior/Cognition: Alert;Cooperative  Vision: Impaired  Hearing: Exceptions to WFL    Impressions:  Patient presents with safe swallow for Dysphagia soft and bite/sized (Dysphagia III) (IDDSI Level 6)  diet with Mildly Thick (Nectar) (IDDSI Level 2)  liquids as evidenced by no overt s/s of aspiration noted with consistencies tested.  Recommend small sips and bites, only feed when alert and awake and upright at 90 degrees for all PO intake.  Recommend close monitoring for overt/clinical s/s of aspiration and D/C PO intake and complete Modified Barium Swallow Study should they occur.  Results and recommendations reported to RN.      Patient Position: Lateral       Consistencies Administered: Soft & Bite Sized;Pureed;Thin straw;Mildly Thick straw    Dysphagia Outcome Severity Scale: Level 5: Mild dysphagia- Distant supervision. May need one diet consistency restricted  Penetration-Aspiration Scale  Diverticulum by Radiologist        Pain      0/10      Therapy Time:   Individual Concurrent Group Co-treatment   Time In  915         Time Out  930         Minutes  15                   Liza Reyes, H[place], 12/12/2024, 12:12 PM    PROCEDURE NOTE  Date: 12/12/2024   Name: Toshia FIGUEROA Brannon  YOB: 1945    Procedures

## 2024-12-12 NOTE — PLAN OF CARE
Problem: Chronic Conditions and Co-morbidities  Goal: Patient's chronic conditions and co-morbidity symptoms are monitored and maintained or improved  12/12/2024 1319 by Huyen Thompson RN  Outcome: Progressing  Flowsheets (Taken 12/12/2024 0800)  Care Plan - Patient's Chronic Conditions and Co-Morbidity Symptoms are Monitored and Maintained or Improved:   Monitor and assess patient's chronic conditions and comorbid symptoms for stability, deterioration, or improvement   Collaborate with multidisciplinary team to address chronic and comorbid conditions and prevent exacerbation or deterioration   Update acute care plan with appropriate goals if chronic or comorbid symptoms are exacerbated and prevent overall improvement and discharge     Problem: Discharge Planning  Goal: Discharge to home or other facility with appropriate resources  12/12/2024 1319 by Huyen Thompson RN  Outcome: Progressing  Flowsheets (Taken 12/12/2024 0800)  Discharge to home or other facility with appropriate resources:   Identify barriers to discharge with patient and caregiver   Arrange for needed discharge resources and transportation as appropriate   Identify discharge learning needs (meds, wound care, etc)     Problem: Pain  Goal: Verbalizes/displays adequate comfort level or baseline comfort level  12/12/2024 1319 by Huyen Thompson, RN  Outcome: Progressing  Flowsheets (Taken 12/12/2024 0800)  Verbalizes/displays adequate comfort level or baseline comfort level:   Encourage patient to monitor pain and request assistance   Assess pain using appropriate pain scale   Administer analgesics based on type and severity of pain and evaluate response     Problem: Skin/Tissue Integrity  Goal: Absence of new skin breakdown  Description: 1.  Monitor for areas of redness and/or skin breakdown  2.  Assess vascular access sites hourly  3.  Every 4-6 hours minimum:  Change oxygen saturation probe site  4.  Every 4-6 hours:  If on nasal continuous positive  airway pressure, respiratory therapy assess nares and determine need for appliance change or resting period.  12/12/2024 1319 by Huyen Thompson RN  Outcome: Progressing     Problem: Safety - Adult  Goal: Free from fall injury  12/12/2024 1319 by Huyen Thompson RN  Outcome: Progressing  Flowsheets (Taken 12/12/2024 0800)  Free From Fall Injury:   Instruct family/caregiver on patient safety   Based on caregiver fall risk screen, instruct family/caregiver to ask for assistance with transferring infant if caregiver noted to have fall risk factors     Problem: ABCDS Injury Assessment  Goal: Absence of physical injury  12/12/2024 1319 by Huyen Thompson RN  Outcome: Progressing  Flowsheets (Taken 12/12/2024 0800)  Absence of Physical Injury: Implement safety measures based on patient assessment     Problem: Respiratory - Adult  Goal: Achieves optimal ventilation and oxygenation  12/12/2024 1319 by Huyen Thompson RN  Outcome: Progressing  Flowsheets (Taken 12/12/2024 0800)  Achieves optimal ventilation and oxygenation:   Assess for changes in respiratory status   Assess for changes in mentation and behavior   Position to facilitate oxygenation and minimize respiratory effort   Oxygen supplementation based on oxygen saturation or arterial blood gases   Encourage broncho-pulmonary hygiene including cough, deep breathe, incentive spirometry   Respiratory therapy support as indicated

## 2024-12-12 NOTE — PROGRESS NOTES
bruit  Peripheral pulses are symmetrical and full   Abdomen:   No masses or tenderness  Bowel sounds present  Extremities:   No Cyanosis or Clubbing   Lower extremity edema: trace   Skin: Warm and dry  Neurological:  Alert and oriented.  Moves all extremities well  No abnormalities of mood, affect, memory, mentation, or behavior are noted      Labs:     CBC:   Recent Labs     12/11/24 0615 12/12/24  0353   WBC 10.7 8.6   HGB 10.4* 11.2*   HCT 33.2* 35.4*    193     BMP:   Recent Labs     12/11/24  0615 12/12/24  0353    141   K 3.7 3.5*   CO2 28 28   BUN 17 15   CREATININE 0.4* 0.4*   LABGLOM >90 >90   GLUCOSE 140* 130*     BNP: No results for input(s): \"BNP\" in the last 72 hours.  PT/INR:   Recent Labs     12/10/24  1711   PROTIME 15.4*   INR 1.2     APTT:  Recent Labs     12/11/24 2028 12/12/24  0353   APTT 48.2* 80.6*     CARDIAC ENZYMES:No results for input(s): \"CKTOTAL\", \"CKMB\", \"CKMBINDEX\", \"TROPONINI\" in the last 72 hours.  FASTING LIPID PANEL:  Lab Results   Component Value Date/Time    HDL 63 04/24/2024 09:26 AM    TRIG 126 06/13/2024 03:20 AM     LIVER PROFILE:  Recent Labs     12/10/24  0912 12/10/24  1711   AST 19 21   ALT 11 11         Patient Active Problem List   Diagnosis    Hypothyroidism    Major depressive disorder    Chronic midline low back pain without sciatica    Iron deficiency anemia    COPD (chronic obstructive pulmonary disease) (HCC)    Biventricular congestive heart failure (HCC)    Anemia    PAF (paroxysmal atrial fibrillation) (HCC)    IPF (idiopathic pulmonary fibrosis) (HCC)    History of subdural hematoma    Fall as cause of accidental injury in home as place of occurrence, initial encounter    GERD (gastroesophageal reflux disease)    Oxygen dependent    Acute on chronic respiratory failure with hypoxia    Community acquired bacterial pneumonia    COPD exacerbation (HCC)    Biventricular heart failure (HCC)    Citrobacter infection    Dysphagia, oropharyngeal

## 2024-12-12 NOTE — PROGRESS NOTES
Comprehensive Nutrition Assessment    Type and Reason for Visit:  Initial, Positive nutrition screen    Nutrition Recommendations/Plan:   Recommend high kcal/high pro ONS (nectar thick) TID + Frozen ONS BID  Will monitor PO intake, diet tolerance, wt, NFPE, and POC     Malnutrition Assessment:  Malnutrition Status:  Insufficient data (? r/t age) (12/12/24 1352)    Context:  Chronic Illness     Findings of the 6 clinical characteristics of malnutrition:  Energy Intake:  No decrease in energy intake  Weight Loss:  No weight loss     Body Fat Loss:  Mild body fat loss Orbital, Triceps   Muscle Mass Loss:  Mild muscle mass loss Clavicles (pectoralis & deltoids)  Fluid Accumulation:  Unable to assess     Strength:  Not Performed    Nutrition Assessment:    79 y.o.F admitted d/t being found unresponsive. Pt was extubated on 12/11. S/p MBSS, cleared for Minced/moist + Nectar thick liq. RD assesed pt for unsure wt loss. Per chart, wt fluctuates but is overall stable x 3 mo. Pt denies decrease in PO intake PTA. RD visited pt. Pt's lunch order taken. Pt is agreeable to diet compliant ONS. Pt exhbitis mild to moderate muscle/fat loss, uncertain d/t age vs malnutrition. RD will continue to monitor per protocol.    Nutrition Related Findings:    +1 generalized edema. No BM documented x 2 days--hypoactive bowel sounds. Wound Type: Multiple (wounds to sacrum, coccyx, buttocks)       Current Nutrition Intake & Therapies:    Average Meal Intake: Unable to assess (just passed swallow study)  Average Supplements Intake: None Ordered  ADULT DIET; Dysphagia - Minced and Moist; Mildly Thick (Nectar)  ADULT ORAL NUTRITION SUPPLEMENT; Lunch, Dinner; Frozen Oral Supplement  ADULT ORAL NUTRITION SUPPLEMENT; Breakfast, Lunch, Dinner; Standard High Calorie/High Protein Oral Supplement    Anthropometric Measures:  Height: 149.9 cm (4' 11.02\")  Ideal Body Weight (IBW): 95 lbs (43 kg)    Current Body Weight: 65.3 kg (143 lb 15.4 oz), 151.5 %

## 2024-12-12 NOTE — PLAN OF CARE
Problem: Chronic Conditions and Co-morbidities  Goal: Patient's chronic conditions and co-morbidity symptoms are monitored and maintained or improved  Outcome: Progressing     Problem: Discharge Planning  Goal: Discharge to home or other facility with appropriate resources  Outcome: Progressing     Problem: Pain  Goal: Verbalizes/displays adequate comfort level or baseline comfort level  Outcome: Progressing     Problem: Skin/Tissue Integrity  Goal: Absence of new skin breakdown  Description: 1.  Monitor for areas of redness and/or skin breakdown  2.  Assess vascular access sites hourly  3.  Every 4-6 hours minimum:  Change oxygen saturation probe site  4.  Every 4-6 hours:  If on nasal continuous positive airway pressure, respiratory therapy assess nares and determine need for appliance change or resting period.  Outcome: Progressing     Problem: Safety - Adult  Goal: Free from fall injury  Outcome: Progressing     Problem: ABCDS Injury Assessment  Goal: Absence of physical injury  Outcome: Progressing     Problem: Respiratory - Adult  Goal: Achieves optimal ventilation and oxygenation  12/12/2024 0108 by Cherrie Chance RN  Outcome: Progressing  12/11/2024 2129 by Elisabeth Dowd RCP  Flowsheets (Taken 12/11/2024 2129)  Achieves optimal ventilation and oxygenation:   Assess for changes in respiratory status   Assess for changes in mentation and behavior   Position to facilitate oxygenation and minimize respiratory effort   Oxygen supplementation based on oxygen saturation or arterial blood gases   Initiate smoking cessation protocol as indicated   Encourage broncho-pulmonary hygiene including cough, deep breathe, incentive spirometry   Assess the need for suctioning and aspirate as needed   Assess and instruct to report shortness of breath or any respiratory difficulty   Respiratory therapy support as indicated

## 2024-12-12 NOTE — PROGRESS NOTES
Critical care team - Resident sign-out to medicine service      Date and time: 12/12/2024 11:37 AM  Patient's name:  Toshia South  Medical Record Number: [id number]  Patient's account/billing number: [id number]  Patient's YOB: 1945  Age: 79 y.o.  Date of Admission: 12/10/2024  3:46 PM  Length of stay during current admission: 2    Primary Care Physician: Jong Moraes MD    Code Status: Full Code    Mode of physician to physician communication:        [x] Via telephone   [] In person     Date and time of sign-out: 12/12/2024 11:37 AM    Accepting Internal Medicine resident: Dr. Santos    Accepting Medicine team: IM Team 3    Accepting team's attending: Dr. Dennis    Patient's current ICU Bed:  3025     Patient's assigned bed on floor:  317        [] Med-Surg Monitored [x] Step-down       [] Psychiatry ICU       [] Psych floor     Reason for ICU admission:     Septic shock  Acute hypoxic respiratory failure  Multifocal pneumonia    ICU course summary:       Patient is 79-year-old lady with history of pulmonary fibrosis with bronchiectasis, chronic hypoxic respiratory failure on 4 L, recurrent multifocal pneumonia, paroxysmal atrial fibrillation on Rivoraxaban who was found unresponsive.    Initially, she was found unresponsive, slumped over her chair and was off oxygen. She was intubated at other facility. She was hypotensive and did not respond to fluids and hence was started on Levophed.    Imaging revealed multifocal consolidation compatible with pneumonia which is similar to before in September. She was transferred to [facility] MICU for high level of care.    MRSA DNA nasal probe positive. Patient was started on Vancomycin, Zosyn, Solu-Medrol and breathing treatments.    She was extubated on 12/11. Patient is saturating well on 4 L(home oxygen). Patient was NPO for some time pending swallow study. She did not receive her anxiety medications and hence had to be started on  ([facility]) 08/04/2023    Multifocal pneumonia 08/04/2023    Community acquired bacterial pneumonia 08/02/2023    COPD exacerbation ([facility]) 08/02/2023    PAF (paroxysmal atrial fibrillation) ([facility]) 02/01/2022    Anemia 10/14/2021    Biventricular congestive heart failure ([facility])     COPD (chronic obstructive pulmonary disease) ([facility]) 10/22/2020    Hypothyroidism 10/05/2018    Major depressive disorder 10/05/2018    Chronic midline low back pain without sciatica 10/05/2018    Iron deficiency anemia 10/05/2018    Chronic pain 05/05/2017       Recommended Follow-up:     Resume patient's anxiety medications as she tolerates  Continue Vanc and Zosyn for total of 7-10 days  Dysphagia diet with mildly thick liquids  Xarelto      Above mentioned assessment and plan was discussed by me with the admitting medicine resident. The medicine team assigned to the patient by medicine admitting resident will be following up the patient from now onwards on the floor.     Keturah Robles, DO  Critical care resident

## 2024-12-12 NOTE — PROGRESS NOTES
Body mass index is 29.08 kg/m².        PHYSICAL EXAM:  Physical Exam     Constitutional: Intubated and sedated;not responding to commands or opening eyes spontaneously  Physical Exam  Cardiovascular:      Rate and Rhythm: Normal rate and regular rhythm.      Pulses: Normal pulses.      Heart sounds: Normal heart sounds.   Pulmonary:      Comments: Fine crackles heard bilaterally  Abdominal:      General: There is distension.      Palpations: Abdomen is soft.      Tenderness: There is no abdominal tenderness.   Musculoskeletal:      Right lower leg: No edema.      Left lower leg: No edema.       MEDICATIONS:  Scheduled Meds:   traZODone  200 mg Oral Nightly    atorvastatin  40 mg Oral Nightly    [Held by provider] bumetanide  2 mg Oral Daily    bumetanide  2 mg IntraVENous Daily    dilTIAZem  10 mg IntraVENous Q8H    HYDROmorphone HCl PF (DILAUDID) 6.075 mg, BUPivacaine (PF) (MARCAINE) 12.757 mg Intrathecal Pump (Patient Supplied)   Intrathecal Daily    sertraline  50 mg Oral Nightly    digoxin  125 mcg Oral Daily    sodium chloride flush  5-40 mL IntraVENous 2 times per day    piperacillin-tazobactam  3,375 mg IntraVENous Q8H    methylPREDNISolone  40 mg IntraVENous Q12H    ipratropium 0.5 mg-albuterol 2.5 mg  1 Dose Inhalation Q4H WA RT    aspirin  81 mg Oral Daily    levothyroxine  200 mcg Oral Daily    pantoprazole (PROTONIX) 40 mg in sodium chloride (PF) 0.9 % 10 mL injection  40 mg IntraVENous Daily    vancomycin  750 mg IntraVENous Q12H    vancomycin (VANCOCIN) intermittent dosing (placeholder)   Other RX Placeholder     Continuous Infusions:   dexmedeTOMIDine 0.6 mcg/kg/hr (12/12/24 0417)    sodium chloride Stopped (12/12/24 0130)    norepinephrine Stopped (12/11/24 0731)    heparin (PORCINE) Infusion 24 Units/kg/hr (12/12/24 0417)     PRN Meds:   labetalol, 10 mg, Q6H PRN  sodium chloride flush, 5-40 mL, PRN  sodium chloride, , PRN  potassium chloride, 20 mEq, PRN   Or  potassium chloride, 10 mEq,  3.5*   CL 98 102 105 105   CO2 34* 26 28 28   BUN 15 15 17 15   CREATININE 0.6 0.6 0.4* 0.4*   GLUCOSE 103* 212* 140* 130*   CALCIUM 9.4 8.4* 8.2* 8.8   BILITOT 0.5 0.3  --   --    ALKPHOS 75 61  --   --    AST 19 21  --   --    ALT 11 11  --   --       Magnesium:   Lab Results   Component Value Date/Time    MG 2.1 12/12/2024 03:53 AM    MG 1.8 12/10/2024 09:12 AM    MG 1.9 09/18/2024 05:15 AM     Phosphorus:   Lab Results   Component Value Date/Time    PHOS 3.2 06/10/2024 08:23 PM    PHOS 2.8 06/10/2024 02:17 PM    PHOS 3.3 08/25/2022 02:38 PM     Ionized Calcium:   Lab Results   Component Value Date/Time    CAION 1.08 08/02/2023 11:35 PM    CAION 1.14 08/26/2022 02:40 AM    CAION 1.14 08/25/2022 04:52 AM        Urinalysis:   Lab Results   Component Value Date/Time    NITRU NEGATIVE 12/10/2024 04:53 PM    COLORU Yellow 12/10/2024 04:53 PM    PHUR 6.5 12/10/2024 04:53 PM    PHUR 6.0 02/13/2024 07:10 PM    WBCUA 20 TO 50 12/10/2024 04:53 PM    RBCUA 20 TO 50 12/10/2024 04:53 PM    MUCUS TRACE 08/02/2023 01:38 PM    TRICHOMONAS NOT REPORTED 10/14/2021 08:07 PM    YEAST NOT REPORTED 10/14/2021 08:07 PM    BACTERIA None 12/10/2024 04:53 PM    LEUKOCYTESUR SMALL 12/10/2024 04:53 PM    UROBILINOGEN Normal 12/10/2024 04:53 PM    BILIRUBINUR NEGATIVE 12/10/2024 04:53 PM    GLUCOSEU TRACE 12/10/2024 04:53 PM    KETUA NEGATIVE 12/10/2024 04:53 PM    AMORPHOUS 2+ 09/09/2022 09:29 PM       HgBA1c:    Lab Results   Component Value Date/Time    LABA1C 5.5 04/24/2024 09:25 AM     TSH:    Lab Results   Component Value Date/Time    TSH 1.46 04/24/2024 09:25 AM     Lactic Acid:   Lab Results   Component Value Date/Time    LACTA 1.6 11/03/2024 01:11 AM    LACTA 1.5 06/11/2024 08:06 AM    LACTA 2.1 06/11/2024 04:59 AM      Troponin: No results for input(s): \"TROPONINI\" in the last 72 hours.          ASSESSMENT:     Patient Active Problem List    Diagnosis Date Noted    Oxygen dependent 02/22/2023    Acute on chronic respiratory failure

## 2024-12-12 NOTE — PLAN OF CARE
Problem: Respiratory - Adult  Goal: Achieves optimal ventilation and oxygenation  12/11/2024 2129 by Elisabeth Dowd RCP  Flowsheets (Taken 12/11/2024 2129)  Achieves optimal ventilation and oxygenation:   Assess for changes in respiratory status   Assess for changes in mentation and behavior   Position to facilitate oxygenation and minimize respiratory effort   Oxygen supplementation based on oxygen saturation or arterial blood gases   Initiate smoking cessation protocol as indicated   Encourage broncho-pulmonary hygiene including cough, deep breathe, incentive spirometry   Assess the need for suctioning and aspirate as needed   Assess and instruct to report shortness of breath or any respiratory difficulty   Respiratory therapy support as indicated

## 2024-12-13 PROBLEM — M16.12 OSTEOARTHRITIS OF LEFT HIP: Status: ACTIVE | Noted: 2024-12-13

## 2024-12-13 PROBLEM — J81.0 ACUTE PULMONARY EDEMA: Status: ACTIVE | Noted: 2024-12-13

## 2024-12-13 LAB
ANION GAP SERPL CALCULATED.3IONS-SCNC: 7 MMOL/L (ref 9–16)
BASOPHILS # BLD: <0.03 K/UL (ref 0–0.2)
BASOPHILS NFR BLD: 0 % (ref 0–2)
BUN SERPL-MCNC: 17 MG/DL (ref 8–23)
CALCIUM SERPL-MCNC: 9.2 MG/DL (ref 8.6–10.4)
CHLORIDE SERPL-SCNC: 103 MMOL/L (ref 98–107)
CO2 SERPL-SCNC: 28 MMOL/L (ref 20–31)
CREAT SERPL-MCNC: 0.6 MG/DL (ref 0.6–0.9)
EKG ATRIAL RATE: 85 BPM
EKG P AXIS: 54 DEGREES
EKG P-R INTERVAL: 168 MS
EKG Q-T INTERVAL: 390 MS
EKG QRS DURATION: 82 MS
EKG QTC CALCULATION (BAZETT): 464 MS
EKG R AXIS: -18 DEGREES
EKG T AXIS: 23 DEGREES
EKG VENTRICULAR RATE: 85 BPM
EOSINOPHIL # BLD: <0.03 K/UL (ref 0–0.44)
EOSINOPHILS RELATIVE PERCENT: 0 % (ref 1–4)
ERYTHROCYTE [DISTWIDTH] IN BLOOD BY AUTOMATED COUNT: 14.4 % (ref 11.8–14.4)
FOLATE SERPL-MCNC: 21 NG/ML (ref 4.8–24.2)
GFR, ESTIMATED: >90 ML/MIN/1.73M2
GLUCOSE BLD-MCNC: 119 MG/DL (ref 65–105)
GLUCOSE BLD-MCNC: 142 MG/DL (ref 65–105)
GLUCOSE BLD-MCNC: 79 MG/DL (ref 65–105)
GLUCOSE BLD-MCNC: 95 MG/DL (ref 65–105)
GLUCOSE SERPL-MCNC: 107 MG/DL (ref 74–99)
HCT VFR BLD AUTO: 38.5 % (ref 36.3–47.1)
HGB BLD-MCNC: 11.8 G/DL (ref 11.9–15.1)
IMM GRANULOCYTES # BLD AUTO: <0.03 K/UL (ref 0–0.3)
IMM GRANULOCYTES NFR BLD: 0 %
IRON SATN MFR SERPL: 27 % (ref 20–55)
IRON SERPL-MCNC: 67 UG/DL (ref 37–145)
LYMPHOCYTES NFR BLD: 0.75 K/UL (ref 1.1–3.7)
LYMPHOCYTES RELATIVE PERCENT: 13 % (ref 24–43)
MCH RBC QN AUTO: 29.9 PG (ref 25.2–33.5)
MCHC RBC AUTO-ENTMCNC: 30.6 G/DL (ref 28.4–34.8)
MCV RBC AUTO: 97.5 FL (ref 82.6–102.9)
MONOCYTES NFR BLD: 0.45 K/UL (ref 0.1–1.2)
MONOCYTES NFR BLD: 8 % (ref 3–12)
NEUTROPHILS NFR BLD: 79 % (ref 36–65)
NEUTS SEG NFR BLD: 4.66 K/UL (ref 1.5–8.1)
NRBC BLD-RTO: 0 PER 100 WBC
PLATELET # BLD AUTO: 242 K/UL (ref 138–453)
PMV BLD AUTO: 9.7 FL (ref 8.1–13.5)
POTASSIUM SERPL-SCNC: 3.6 MMOL/L (ref 3.7–5.3)
RBC # BLD AUTO: 3.95 M/UL (ref 3.95–5.11)
SODIUM SERPL-SCNC: 138 MMOL/L (ref 136–145)
TIBC SERPL-MCNC: 251 UG/DL (ref 250–450)
TSH SERPL DL<=0.05 MIU/L-ACNC: 2.1 UIU/ML (ref 0.27–4.2)
UNSATURATED IRON BINDING CAPACITY: 184 UG/DL (ref 112–347)
VIT B12 SERPL-MCNC: 289 PG/ML (ref 232–1245)
WBC OTHER # BLD: 5.9 K/UL (ref 3.5–11.3)

## 2024-12-13 PROCEDURE — 6370000000 HC RX 637 (ALT 250 FOR IP)

## 2024-12-13 PROCEDURE — 1200000000 HC SEMI PRIVATE

## 2024-12-13 PROCEDURE — 2700000000 HC OXYGEN THERAPY PER DAY

## 2024-12-13 PROCEDURE — 82947 ASSAY GLUCOSE BLOOD QUANT: CPT

## 2024-12-13 PROCEDURE — 84443 ASSAY THYROID STIM HORMONE: CPT

## 2024-12-13 PROCEDURE — 94640 AIRWAY INHALATION TREATMENT: CPT

## 2024-12-13 PROCEDURE — 83540 ASSAY OF IRON: CPT

## 2024-12-13 PROCEDURE — 99233 SBSQ HOSP IP/OBS HIGH 50: CPT | Performed by: INTERNAL MEDICINE

## 2024-12-13 PROCEDURE — 97162 PT EVAL MOD COMPLEX 30 MIN: CPT

## 2024-12-13 PROCEDURE — 6360000002 HC RX W HCPCS

## 2024-12-13 PROCEDURE — 99233 SBSQ HOSP IP/OBS HIGH 50: CPT | Performed by: NURSE PRACTITIONER

## 2024-12-13 PROCEDURE — 6370000000 HC RX 637 (ALT 250 FOR IP): Performed by: INTERNAL MEDICINE

## 2024-12-13 PROCEDURE — 80048 BASIC METABOLIC PNL TOTAL CA: CPT

## 2024-12-13 PROCEDURE — 2580000003 HC RX 258

## 2024-12-13 PROCEDURE — 83550 IRON BINDING TEST: CPT

## 2024-12-13 PROCEDURE — 97530 THERAPEUTIC ACTIVITIES: CPT

## 2024-12-13 PROCEDURE — 97116 GAIT TRAINING THERAPY: CPT

## 2024-12-13 PROCEDURE — 36415 COLL VENOUS BLD VENIPUNCTURE: CPT

## 2024-12-13 PROCEDURE — 85025 COMPLETE CBC W/AUTO DIFF WBC: CPT

## 2024-12-13 RX ORDER — PANTOPRAZOLE SODIUM 40 MG/1
40 TABLET, DELAYED RELEASE ORAL
Status: DISCONTINUED | OUTPATIENT
Start: 2024-12-13 | End: 2024-12-14 | Stop reason: HOSPADM

## 2024-12-13 RX ORDER — PREDNISONE 20 MG/1
40 TABLET ORAL DAILY
Status: DISCONTINUED | OUTPATIENT
Start: 2024-12-13 | End: 2024-12-14 | Stop reason: HOSPADM

## 2024-12-13 RX ORDER — LEVOFLOXACIN 750 MG/1
750 TABLET, FILM COATED ORAL DAILY
Status: COMPLETED | OUTPATIENT
Start: 2024-12-13 | End: 2024-12-14

## 2024-12-13 RX ORDER — POTASSIUM CHLORIDE 1500 MG/1
40 TABLET, EXTENDED RELEASE ORAL ONCE
Status: COMPLETED | OUTPATIENT
Start: 2024-12-13 | End: 2024-12-13

## 2024-12-13 RX ORDER — KETOROLAC TROMETHAMINE 15 MG/ML
15 INJECTION, SOLUTION INTRAMUSCULAR; INTRAVENOUS ONCE
Status: COMPLETED | OUTPATIENT
Start: 2024-12-13 | End: 2024-12-13

## 2024-12-13 RX ORDER — LEVOFLOXACIN 500 MG/1
500 TABLET, FILM COATED ORAL DAILY
Status: DISCONTINUED | OUTPATIENT
Start: 2024-12-13 | End: 2024-12-13

## 2024-12-13 RX ORDER — ALENDRONATE SODIUM 70 MG/1
70 TABLET ORAL WEEKLY
Status: DISCONTINUED | OUTPATIENT
Start: 2024-12-15 | End: 2024-12-13

## 2024-12-13 RX ORDER — BUDESONIDE AND FORMOTEROL FUMARATE DIHYDRATE 160; 4.5 UG/1; UG/1
2 AEROSOL RESPIRATORY (INHALATION)
Status: DISCONTINUED | OUTPATIENT
Start: 2024-12-13 | End: 2024-12-14 | Stop reason: HOSPADM

## 2024-12-13 RX ADMIN — KETOROLAC TROMETHAMINE 15 MG: 15 INJECTION, SOLUTION INTRAMUSCULAR; INTRAVENOUS at 19:43

## 2024-12-13 RX ADMIN — PIPERACILLIN AND TAZOBACTAM 3375 MG: 3; .375 INJECTION, POWDER, LYOPHILIZED, FOR SOLUTION INTRAVENOUS at 05:58

## 2024-12-13 RX ADMIN — IPRATROPIUM BROMIDE AND ALBUTEROL SULFATE 1 DOSE: .5; 3 SOLUTION RESPIRATORY (INHALATION) at 20:54

## 2024-12-13 RX ADMIN — POTASSIUM CHLORIDE 40 MEQ: 1500 TABLET, EXTENDED RELEASE ORAL at 12:13

## 2024-12-13 RX ADMIN — LEVOTHYROXINE SODIUM 200 MCG: 75 TABLET ORAL at 06:02

## 2024-12-13 RX ADMIN — DILTIAZEM HYDROCHLORIDE 120 MG: 120 CAPSULE, COATED, EXTENDED RELEASE ORAL at 09:12

## 2024-12-13 RX ADMIN — WATER 40 MG: 1 INJECTION INTRAMUSCULAR; INTRAVENOUS; SUBCUTANEOUS at 05:52

## 2024-12-13 RX ADMIN — HYDROCODONE BITARTRATE AND ACETAMINOPHEN 1 TABLET: 5; 325 TABLET ORAL at 17:51

## 2024-12-13 RX ADMIN — PREDNISONE 40 MG: 20 TABLET ORAL at 09:11

## 2024-12-13 RX ADMIN — HYDROCODONE BITARTRATE AND ACETAMINOPHEN 1 TABLET: 5; 325 TABLET ORAL at 09:11

## 2024-12-13 RX ADMIN — BUDESONIDE AND FORMOTEROL FUMARATE DIHYDRATE 2 PUFF: 160; 4.5 AEROSOL RESPIRATORY (INHALATION) at 20:54

## 2024-12-13 RX ADMIN — VANCOMYCIN HYDROCHLORIDE 1000 MG: 1 INJECTION, POWDER, LYOPHILIZED, FOR SOLUTION INTRAVENOUS at 11:23

## 2024-12-13 RX ADMIN — BUDESONIDE AND FORMOTEROL FUMARATE DIHYDRATE 2 PUFF: 160; 4.5 AEROSOL RESPIRATORY (INHALATION) at 08:04

## 2024-12-13 RX ADMIN — SODIUM CHLORIDE, PRESERVATIVE FREE 10 ML: 5 INJECTION INTRAVENOUS at 19:44

## 2024-12-13 RX ADMIN — ONDANSETRON 4 MG: 2 INJECTION INTRAMUSCULAR; INTRAVENOUS at 13:42

## 2024-12-13 RX ADMIN — DIGOXIN 125 MCG: 125 TABLET ORAL at 09:11

## 2024-12-13 RX ADMIN — IPRATROPIUM BROMIDE AND ALBUTEROL SULFATE 1 DOSE: .5; 3 SOLUTION RESPIRATORY (INHALATION) at 15:24

## 2024-12-13 RX ADMIN — TRAZODONE HYDROCHLORIDE 200 MG: 50 TABLET ORAL at 20:16

## 2024-12-13 RX ADMIN — PIPERACILLIN AND TAZOBACTAM 3375 MG: 3; .375 INJECTION, POWDER, LYOPHILIZED, FOR SOLUTION INTRAVENOUS at 13:43

## 2024-12-13 RX ADMIN — IPRATROPIUM BROMIDE AND ALBUTEROL SULFATE 1 DOSE: .5; 3 SOLUTION RESPIRATORY (INHALATION) at 08:04

## 2024-12-13 RX ADMIN — BUMETANIDE 2 MG: 0.25 INJECTION INTRAMUSCULAR; INTRAVENOUS at 09:12

## 2024-12-13 RX ADMIN — RIVAROXABAN 20 MG: 20 TABLET, FILM COATED ORAL at 16:41

## 2024-12-13 RX ADMIN — SERTRALINE HYDROCHLORIDE 50 MG: 50 TABLET ORAL at 20:12

## 2024-12-13 RX ADMIN — ASPIRIN 81 MG: 81 TABLET, CHEWABLE ORAL at 09:12

## 2024-12-13 RX ADMIN — LEVOFLOXACIN 750 MG: 750 TABLET, FILM COATED ORAL at 16:41

## 2024-12-13 RX ADMIN — SODIUM CHLORIDE, PRESERVATIVE FREE 10 ML: 5 INJECTION INTRAVENOUS at 09:12

## 2024-12-13 RX ADMIN — PANTOPRAZOLE SODIUM 40 MG: 40 TABLET, DELAYED RELEASE ORAL at 06:02

## 2024-12-13 RX ADMIN — POTASSIUM BICARBONATE 40 MEQ: 782 TABLET, EFFERVESCENT ORAL at 09:15

## 2024-12-13 RX ADMIN — IPRATROPIUM BROMIDE AND ALBUTEROL SULFATE 1 DOSE: .5; 3 SOLUTION RESPIRATORY (INHALATION) at 02:35

## 2024-12-13 RX ADMIN — ATORVASTATIN CALCIUM 40 MG: 40 TABLET, FILM COATED ORAL at 20:12

## 2024-12-13 ASSESSMENT — PAIN DESCRIPTION - LOCATION
LOCATION: LEG
LOCATION: HIP;LEG;KNEE
LOCATION: LEG;HIP
LOCATION: LEG;HIP

## 2024-12-13 ASSESSMENT — PAIN DESCRIPTION - DESCRIPTORS
DESCRIPTORS: SHARP
DESCRIPTORS: ACHING;DISCOMFORT

## 2024-12-13 ASSESSMENT — PAIN DESCRIPTION - ORIENTATION
ORIENTATION: LEFT

## 2024-12-13 ASSESSMENT — PAIN SCALES - GENERAL
PAINLEVEL_OUTOF10: 9
PAINLEVEL_OUTOF10: 10
PAINLEVEL_OUTOF10: 7

## 2024-12-13 ASSESSMENT — PAIN DESCRIPTION - PAIN TYPE: TYPE: CHRONIC PAIN

## 2024-12-13 NOTE — PROGRESS NOTES
[facility]   Pharmacy Pharmacokinetic Monitoring Service - Vancomycin    Consulting Provider: ISAIAH Drake   Indication: Pneumonia  Target Concentration: Goal AUC/ROBERTO 400-600 mg*hr/L  Day of Therapy: 4  Additional Antimicrobials: zosyn    Pertinent Laboratory Values:   Wt Readings from Last 1 Encounters:   12/13/24 67.7 kg (149 lb 4 oz)     Temp Readings from Last 1 Encounters:   12/13/24 98 °F (36.7 °C) (Axillary)     Estimated Creatinine Clearance: 69 mL/min (based on SCr of 0.6 mg/dL).  Recent Labs     12/12/24  0353 12/13/24  0253   CREATININE 0.4* 0.6   BUN 15 17   WBC 8.6 5.9       Recent vancomycin administrations                     vancomycin (VANCOCIN) 750 mg in sodium chloride 0.9 % 250 mL IVPB ([id number]) (mg) 750 mg New Bag 12/12/24 2306     750 mg New Bag  1043     750 mg New Bag 12/11/24 2219     750 mg New Bag  0938     750 mg New Bag 12/10/24 2225    vancomycin (VANCOCIN) 1000 mg in 200 mL IVPB (mg) 1,000 mg New Bag 12/10/24 1044                    Assessment:  Date/Time Current Dose Concentration Timing of Concentration (h) AUC   12/12/24 750mg IV q 12 6.9 ~11h post 332   Note: Serum concentrations collected for AUC dosing may appear elevated if collected in close proximity to the dose administered, this is not necessarily an indication of toxicity    Plan:  Current dosing regimen is sub-therapeutic  Increase dose to 1000mg IV every 12 hours  Repeat vancomycin concentration ordered for tbd @ tbd   Pharmacy will continue to monitor patient and adjust therapy as indicated    Thank you for the consult,  Cindy Zambrano RPH  12/13/2024 10:24 AM

## 2024-12-13 NOTE — CARE COORDINATION
Transitional Plan    1500 Home with [facility] [employer], has walker, cane, BiPAP, BSC, and Home O2@4L. Family will transport and bring travel oxygen tank

## 2024-12-13 NOTE — PLAN OF CARE
Problem: Chronic Conditions and Co-morbidities  Goal: Patient's chronic conditions and co-morbidity symptoms are monitored and maintained or improved  12/13/2024 0729 by Marcy Myles RN  Outcome: Progressing  12/13/2024 0124 by Renita Bernabe RN  Outcome: Progressing     Problem: Discharge Planning  Goal: Discharge to home or other facility with appropriate resources  12/13/2024 0729 by Marcy Myles RN  Outcome: Progressing  12/13/2024 0124 by Renita Bernabe RN  Outcome: Progressing     Problem: Skin/Tissue Integrity  Goal: Absence of new skin breakdown  Description: 1.  Monitor for areas of redness and/or skin breakdown  2.  Assess vascular access sites hourly  3.  Every 4-6 hours minimum:  Change oxygen saturation probe site  4.  Every 4-6 hours:  If on nasal continuous positive airway pressure, respiratory therapy assess nares and determine need for appliance change or resting period.  12/13/2024 0729 by Marcy Myles RN  Outcome: Progressing  12/13/2024 0124 by Renita Bernabe, RN  Outcome: Progressing

## 2024-12-13 NOTE — PROGRESS NOTES
[facility]  Internal Medicine Teaching Residency Program  Inpatient Daily Progress Note  ______________________________________________________________________________    Patient: Toshia South  YOB: 1945   MRN:[id number]    Acct: [id number]     Room: 0421/0421-02  Admit date: 12/10/2024  Today's date: 12/13/24  Number of days in the hospital: 3    SUBJECTIVE   Admitting Diagnosis: Acute on chronic respiratory failure  CC: Found unresponsive  Pt examined at bedside. Chart & results reviewed.   Vitals stable and continue to saturate well on 4 L oxygen -up to 100%, reduced to 3 L, remaining afebrile  BP on the higher side -continues to experience left hip pain, give as needed pain relief and recheck BP later on  No other active symptoms          ROS:  Constitutional:  negative for chills, fevers, sweats  Respiratory:  negative for cough, dyspnea on exertion, hemoptysis, shortness of breath, wheezing  Cardiovascular:  negative for chest pain, chest pressure/discomfort, lower extremity edema, palpitations  Gastrointestinal:  negative for abdominal pain, constipation, diarrhea, nausea, vomiting  Neurological:  negative for dizziness, headache  BRIEF HISTORY   71-year-old female known to have past medical history of:    Pulmonary fibrosis  COPD  Bronchiectasis  Chronic hypoxic respiratory failure on 4 L oxygen at home  Recurrent multifocal pneumonia  Paroxysmal atrial fibrillation on Xarelto  Diastolic heart failure - HFpEF  GERD  Osteoporosis   Sleep apnea   Left hip osteoarthritis     She had been experiencing shortness of breath fever and cough for few days.  She was found unresponsive, slumped in her chair and was off oxygen.  She presented to [facility] where she was intubated on 12/10.  She also started to become hypotensive and was started on Levophed infusion.  She was transferred to [facility] for higher level of care.  CT chest showed

## 2024-12-13 NOTE — PROGRESS NOTES
PHARMACY NOTE    Toshia South was ordered the oral bisphosphonate, alendronate. Oral Bisphosphonates have an increased risk of serious GI events if the strict dosing instructions are not followed. Per the FDA labeling, oral bisphosphonates must be taken at least 30 min (60 min for ibandronate) before the first food, beverage or medication of the day.  Patients MUST also avoid lying down for at least 30 min (60 minutes for ibandronate) after taking the oral bisphosphonate.   Because these strict dosage instructions are difficult to follow in many hospitalized patients, orders for oral bisphosphonate therapy will be discontinued per the [facility] Formulary Committee Policy. Consider risk versus benefits when resuming the oral bisphosphonate at discharge.

## 2024-12-13 NOTE — FLOWSHEET NOTE
Patient transferred to 4B room 421 on monitor with all belongings. Susi RN at bedside to receive patients. All questions answered.

## 2024-12-13 NOTE — PROGRESS NOTES
Physical Therapy  Facility/Department: [address] STEPDOWN  Physical Therapy Initial Assessment    Name: Toshia South  : 1945  MRN: [id number]  Date of Service: 2024    Discharge Recommendations:  Patient would benefit from continued therapy after discharge   PT Equipment Recommendations  Equipment Needed: No      Patient Diagnosis(es): The encounter diagnosis was Acute pulmonary edema.  Past Medical History:  has a past medical history of A-fib (HCC), Anxiety, Atrial fibrillation (HCC), Biventricular congestive heart failure (HCC), Bronchiectasis with acute exacerbation (HCC), CAD (coronary artery disease), Chronic pain syndrome, COPD (chronic obstructive pulmonary disease) (HCC), Depression, GERD (gastroesophageal reflux disease), Hypothyroidism, Impaired fasting glucose, Iron deficiency anemia, Osteoporosis, Pulmonary fibrosis (HCC), Sleep apnea, and Subdural hematoma.  Past Surgical History:  has a past surgical history that includes Hysterectomy (1991); Carpal tunnel release (Right, 1999); Total knee arthroplasty (Right, 2021); fracture surgery (Left, 2018); Wrist fracture surgery (Left, 2018); lumbar discectomy (1993); Lumbar disc surgery (02/15/1994); back surgery (1998); back surgery (1999); Carpal tunnel release (Left, 1999); Neck surgery (2002); Carpal tunnel release (Right, 2002); Carpal tunnel release (Left, 2003); back surgery (10/23/2003); back surgery (10/23/2003); Cervical disc surgery (10/25/2004); Cervical disc surgery (2004); Neck surgery (2005); Toe amputation (10/08/2006); Toe amputation (2008); lumbar laminectomy (2008); Toe amputation (10/03/2008); Humerus fracture surgery (Right, 10/03/2010); Knee arthroscopy (Right, 2011); back surgery (2017); knee surgery (Right, 2016); Wrist fracture surgery (Left, 2018); Wrist surgery (Left, 2019); craniotomy (Left,  Functional Limits  Subjective  Subjective: Pt supine in bed and agreeable to therapy, RN agreeable to therapy.  Pt pleasant and cooperative throughout.  Pt reports 9/10 L hip and knee pain at rest, pt repositioned for comfort following mobility.         Social/Functional History  Social/Functional History  Lives With: Spouse  Type of Home: House  Home Layout: Two level, Able to Live on Main level with bedroom/bathroom, Performs ADL's on one level  Home Access: Stairs to enter with rails  Entrance Stairs - Number of Steps: 1  Entrance Stairs - Rails: Both  Bathroom Shower/Tub: Tub/Shower unit  Bathroom Toilet: Handicap height  Bathroom Equipment: Grab bars in shower, Shower chair  Home Equipment: Cane, Walker - Rolling, Wheelchair - Manual, Rollator (Pt reports using rollator for community mobility, RW for household mobility.)  Has the patient had two or more falls in the past year or any fall with injury in the past year?:  (1 fall secondary to LLE giving out)  Receives Help From: Family, Home health  Prior Level of Assist for ADLs: Independent  Toileting: Independent  Prior Level of Assist for Homemaking: Independent  Homemaking Responsibilities: Yes (shares with )  Prior Level of Assist for Ambulation: Independent community ambulator, with or without device  Prior Level of Assist for Transfers: Independent  Active : Yes  Mode of Transportation: Turbine  Occupation: Retired  Type of Occupation: babysitting  Leisure & Hobbies: babysitting  Additional Comments: Pt reports her  utilizes a cane and has some mobility issues, sons live nearby and can provide some assistance upon discharge.  Vision/Hearing  Vision  Vision: Impaired  Vision Exceptions: Wears glasses at all times  Hearing  Hearing: Exceptions to WFL  Hearing Exceptions: Hard of hearing/hearing concerns    Cognition   Cognition  Overall Cognitive Status: Exceptions  Arousal/Alertness: Appears intact  Following Commands: Follows multistep

## 2024-12-13 NOTE — PLAN OF CARE
Problem: Respiratory - Adult  Goal: Achieves optimal ventilation and oxygenation  12/13/2024 0236 by Mimi Medina RCP  Outcome: Progressing

## 2024-12-13 NOTE — PROGRESS NOTES
[facility]  Progress Note                   Date:   12/13/2024  Patient name: Toshia South  Date of admission:  12/10/2024  3:46 PM  MRN:   [id number]  YOB: 1945  PCP: Jong Moraes MD    Reason for Admission: Acute on chronic respiratory failure [J96.20]  Respiratory failure [J96.90]    Subjective:       Clinical Changes /Abnormalities: Seen & examined in room. No acute CV issues/concerns overnight. Labs, vitals, & tele reviewed. Tx to step-down bed.     Review of Systems    Medications:   Scheduled Meds:   budesonide-formoterol  2 puff Inhalation BID RT    pantoprazole  40 mg Oral QAM AC    predniSONE  40 mg Oral Daily    vancomycin  1,000 mg IntraVENous Q12H    potassium chloride  40 mEq Oral Once    traZODone  200 mg Oral Nightly    rivaroxaban  20 mg Oral Daily    ipratropium 0.5 mg-albuterol 2.5 mg  1 Dose Inhalation TID RT    dilTIAZem  120 mg Oral Daily    atorvastatin  40 mg Oral Nightly    bumetanide  2 mg Oral Daily    HYDROmorphone HCl PF (DILAUDID) 6.075 mg, BUPivacaine (PF) (MARCAINE) 12.757 mg Intrathecal Pump (Patient Supplied)   Intrathecal Daily    sertraline  50 mg Oral Nightly    digoxin  125 mcg Oral Daily    sodium chloride flush  5-40 mL IntraVENous 2 times per day    piperacillin-tazobactam  3,375 mg IntraVENous Q8H    aspirin  81 mg Oral Daily    levothyroxine  200 mcg Oral Daily    vancomycin (VANCOCIN) intermittent dosing (placeholder)   Other RX Placeholder     Continuous Infusions:   sodium chloride Stopped (12/12/24 1746)     CBC:   Recent Labs     12/11/24  0615 12/12/24  0353 12/13/24  0253   WBC 10.7 8.6 5.9   HGB 10.4* 11.2* 11.8*    193 242     BMP:    Recent Labs     12/11/24  0615 12/12/24  0353 12/13/24  0253    141 138   K 3.7 3.5* 3.6*    105 103   CO2 28 28 28   BUN 17 15 17   CREATININE 0.4* 0.4* 0.6   GLUCOSE 140* 130* 107*     Hepatic:  Recent Labs     12/10/24  1711   AST 21   ALT 11   BILITOT 0.3   ALKPHOS

## 2024-12-13 NOTE — PLAN OF CARE
Problem: Chronic Conditions and Co-morbidities  Goal: Patient's chronic conditions and co-morbidity symptoms are monitored and maintained or improved  12/13/2024 0124 by Renita Bernabe RN  Outcome: Progressing  12/12/2024 1319 by Huyen Thompson RN  Outcome: Progressing  Flowsheets (Taken 12/12/2024 0800)  Care Plan - Patient's Chronic Conditions and Co-Morbidity Symptoms are Monitored and Maintained or Improved:   Monitor and assess patient's chronic conditions and comorbid symptoms for stability, deterioration, or improvement   Collaborate with multidisciplinary team to address chronic and comorbid conditions and prevent exacerbation or deterioration   Update acute care plan with appropriate goals if chronic or comorbid symptoms are exacerbated and prevent overall improvement and discharge     Problem: Discharge Planning  Goal: Discharge to home or other facility with appropriate resources  12/13/2024 0124 by Renita Bernabe RN  Outcome: Progressing  12/12/2024 1319 by Huyen Thompson RN  Outcome: Progressing  Flowsheets (Taken 12/12/2024 0800)  Discharge to home or other facility with appropriate resources:   Identify barriers to discharge with patient and caregiver   Arrange for needed discharge resources and transportation as appropriate   Identify discharge learning needs (meds, wound care, etc)     Problem: Pain  Goal: Verbalizes/displays adequate comfort level or baseline comfort level  12/13/2024 0124 by Renita Bernabe RN  Outcome: Progressing  12/12/2024 1319 by Huyen Thompson RN  Outcome: Progressing  Flowsheets (Taken 12/12/2024 0800)  Verbalizes/displays adequate comfort level or baseline comfort level:   Encourage patient to monitor pain and request assistance   Assess pain using appropriate pain scale   Administer analgesics based on type and severity of pain and evaluate response     Problem: Skin/Tissue Integrity  Goal: Absence of new skin breakdown  Description: 1.  Monitor for areas of redness

## 2024-12-13 NOTE — PROGRESS NOTES
[facility]  Internal Medicine Teaching Residency Program  Inpatient Daily Progress Note  ______________________________________________________________________________    Patient: Toshia South  YOB: 1945   MRN:[id number]    Acct: [id number]     Room: 0421/0421-02  Admit date: 12/10/2024  Today's date: 12/12/24  Number of days in the hospital: 2    SUBJECTIVE   Admitting Diagnosis: Acute on chronic respiratory failure  CC: Found unresponsive  Pt examined at bedside. Chart & results reviewed.   Reports feeling better and breathing improving  Complains of ongoing pain over the left hip  No other active symptoms    Vitals stable and saturating well on 4 L oxygen  Afebrile    ROS:  Constitutional:  negative for chills, fevers, sweats  Respiratory:  negative for cough, dyspnea on exertion, hemoptysis, shortness of breath, wheezing  Cardiovascular:  negative for chest pain, chest pressure/discomfort, lower extremity edema, palpitations  Gastrointestinal:  negative for abdominal pain, constipation, diarrhea, nausea, vomiting  Neurological:  negative for dizziness, headache  BRIEF HISTORY   71-year-old female known to have past medical history of:    Pulmonary fibrosis  COPD  Bronchiectasis  Chronic hypoxic respiratory failure on 4 L oxygen at home  Recurrent multifocal pneumonia  Paroxysmal atrial fibrillation on Xarelto  Diastolic heart failure - HFpEF  GERD  Osteoporosis   Sleep apnea   Left hip osteoarthritis     She had been experiencing shortness of breath fever and cough for few days.  She was found unresponsive, slumped in her chair and was off oxygen.  She presented to [facility] where she was intubated on 12/10.  She also started to become hypotensive and was started on Levophed infusion.  She was transferred to [facility] for higher level of care.  CT chest showed multifocal pneumonia and she has been on Zosyn and vancomycin.  MRSA DNA nasal  obvious ischemic changes   Echo - EF 70-75 %, normal wall motion and diastolic function  Continue ASA and statin   Cardiology on board - appreciates recs   Bumex 2 mg x IV daily     Hypothyroidism  On Synthroid 200 mcg  Latest TSH in April -1.46  Repeat TSH tomorrow    Hypokalemia:   K 3.5   Replace and monitor levels     Osteoarthritis left hip:  On transdermal pain pump -on abdomen -Dilaudid and bupivacaine  On PRN Norco   Optimize pain control     DVT ppx : Xarelto  GI ppx: Protonix     PT/OT: consulted   Discharge Planning / SW: Plan is home with Jose A Wyatt MD  Internal Medicine Resident, PGY-1  [facility]; [place], [place]  12/12/2024, 11:49 PM

## 2024-12-14 VITALS
TEMPERATURE: 98 F | WEIGHT: 149.25 LBS | OXYGEN SATURATION: 97 % | BODY MASS INDEX: 30.09 KG/M2 | HEIGHT: 59 IN | RESPIRATION RATE: 21 BRPM | HEART RATE: 118 BPM | SYSTOLIC BLOOD PRESSURE: 145 MMHG | DIASTOLIC BLOOD PRESSURE: 72 MMHG

## 2024-12-14 PROBLEM — J96.90 RESPIRATORY FAILURE: Status: RESOLVED | Noted: 2024-12-10 | Resolved: 2024-12-14

## 2024-12-14 PROBLEM — I21.4 NSTEMI (NON-ST ELEVATED MYOCARDIAL INFARCTION) (HCC): Status: ACTIVE | Noted: 2024-12-14

## 2024-12-14 PROBLEM — J96.21 ACUTE ON CHRONIC RESPIRATORY FAILURE WITH HYPOXIA: Status: RESOLVED | Noted: 2023-02-22 | Resolved: 2024-12-14

## 2024-12-14 PROBLEM — J81.0 ACUTE PULMONARY EDEMA: Status: RESOLVED | Noted: 2024-12-13 | Resolved: 2024-12-14

## 2024-12-14 LAB
ANION GAP SERPL CALCULATED.3IONS-SCNC: 9 MMOL/L (ref 9–16)
BUN SERPL-MCNC: 22 MG/DL (ref 8–23)
CALCIUM SERPL-MCNC: 9 MG/DL (ref 8.6–10.4)
CHLORIDE SERPL-SCNC: 102 MMOL/L (ref 98–107)
CO2 SERPL-SCNC: 30 MMOL/L (ref 20–31)
CREAT SERPL-MCNC: 0.6 MG/DL (ref 0.6–0.9)
GFR, ESTIMATED: >90 ML/MIN/1.73M2
GLUCOSE BLD-MCNC: 106 MG/DL (ref 65–105)
GLUCOSE SERPL-MCNC: 98 MG/DL (ref 74–99)
MAGNESIUM SERPL-MCNC: 2.3 MG/DL (ref 1.6–2.4)
POTASSIUM SERPL-SCNC: 3.3 MMOL/L (ref 3.7–5.3)
SODIUM SERPL-SCNC: 141 MMOL/L (ref 136–145)

## 2024-12-14 PROCEDURE — 6370000000 HC RX 637 (ALT 250 FOR IP)

## 2024-12-14 PROCEDURE — 2580000003 HC RX 258

## 2024-12-14 PROCEDURE — 83735 ASSAY OF MAGNESIUM: CPT

## 2024-12-14 PROCEDURE — 2700000000 HC OXYGEN THERAPY PER DAY

## 2024-12-14 PROCEDURE — 80048 BASIC METABOLIC PNL TOTAL CA: CPT

## 2024-12-14 PROCEDURE — 94640 AIRWAY INHALATION TREATMENT: CPT

## 2024-12-14 PROCEDURE — 82947 ASSAY GLUCOSE BLOOD QUANT: CPT

## 2024-12-14 PROCEDURE — 36415 COLL VENOUS BLD VENIPUNCTURE: CPT

## 2024-12-14 PROCEDURE — 99239 HOSP IP/OBS DSCHRG MGMT >30: CPT | Performed by: INTERNAL MEDICINE

## 2024-12-14 PROCEDURE — 6370000000 HC RX 637 (ALT 250 FOR IP): Performed by: INTERNAL MEDICINE

## 2024-12-14 PROCEDURE — 94761 N-INVAS EAR/PLS OXIMETRY MLT: CPT

## 2024-12-14 RX ORDER — POTASSIUM CHLORIDE 1500 MG/1
40 TABLET, EXTENDED RELEASE ORAL ONCE
Status: COMPLETED | OUTPATIENT
Start: 2024-12-14 | End: 2024-12-14

## 2024-12-14 RX ORDER — BUDESONIDE AND FORMOTEROL FUMARATE DIHYDRATE 160; 4.5 UG/1; UG/1
2 AEROSOL RESPIRATORY (INHALATION)
Qty: 10.2 G | Refills: 3 | Status: CANCELLED | OUTPATIENT
Start: 2024-12-14

## 2024-12-14 RX ORDER — PREDNISONE 20 MG/1
40 TABLET ORAL DAILY
Qty: 10 TABLET | Refills: 0 | Status: SHIPPED | OUTPATIENT
Start: 2024-12-15 | End: 2024-12-20

## 2024-12-14 RX ORDER — ATORVASTATIN CALCIUM 40 MG/1
40 TABLET, FILM COATED ORAL NIGHTLY
Qty: 30 TABLET | Refills: 3 | Status: SHIPPED | OUTPATIENT
Start: 2024-12-14

## 2024-12-14 RX ADMIN — SODIUM CHLORIDE, PRESERVATIVE FREE 10 ML: 5 INJECTION INTRAVENOUS at 08:15

## 2024-12-14 RX ADMIN — DILTIAZEM HYDROCHLORIDE 120 MG: 120 CAPSULE, COATED, EXTENDED RELEASE ORAL at 08:15

## 2024-12-14 RX ADMIN — LEVOFLOXACIN 750 MG: 750 TABLET, FILM COATED ORAL at 08:14

## 2024-12-14 RX ADMIN — BUDESONIDE AND FORMOTEROL FUMARATE DIHYDRATE 2 PUFF: 160; 4.5 AEROSOL RESPIRATORY (INHALATION) at 08:50

## 2024-12-14 RX ADMIN — PREDNISONE 40 MG: 20 TABLET ORAL at 08:14

## 2024-12-14 RX ADMIN — LEVOTHYROXINE SODIUM 200 MCG: 75 TABLET ORAL at 05:50

## 2024-12-14 RX ADMIN — DIGOXIN 125 MCG: 125 TABLET ORAL at 08:14

## 2024-12-14 RX ADMIN — ASPIRIN 81 MG: 81 TABLET, CHEWABLE ORAL at 08:15

## 2024-12-14 RX ADMIN — PANTOPRAZOLE SODIUM 40 MG: 40 TABLET, DELAYED RELEASE ORAL at 05:50

## 2024-12-14 RX ADMIN — HYDROCODONE BITARTRATE AND ACETAMINOPHEN 1 TABLET: 5; 325 TABLET ORAL at 08:14

## 2024-12-14 RX ADMIN — IPRATROPIUM BROMIDE AND ALBUTEROL SULFATE 1 DOSE: .5; 3 SOLUTION RESPIRATORY (INHALATION) at 08:43

## 2024-12-14 RX ADMIN — HYDROCODONE BITARTRATE AND ACETAMINOPHEN 1 TABLET: 5; 325 TABLET ORAL at 02:00

## 2024-12-14 RX ADMIN — BUMETANIDE 2 MG: 1 TABLET ORAL at 08:14

## 2024-12-14 RX ADMIN — POTASSIUM CHLORIDE 40 MEQ: 1500 TABLET, EXTENDED RELEASE ORAL at 05:58

## 2024-12-14 ASSESSMENT — ENCOUNTER SYMPTOMS
SHORTNESS OF BREATH: 0
EYES NEGATIVE: 1
CHEST TIGHTNESS: 0
ABDOMINAL PAIN: 0
NAUSEA: 0
COUGH: 0
VOMITING: 0
CHOKING: 0
DIARRHEA: 0
CONSTIPATION: 0
ALLERGIC/IMMUNOLOGIC NEGATIVE: 1

## 2024-12-14 ASSESSMENT — PAIN SCALES - GENERAL
PAINLEVEL_OUTOF10: 9
PAINLEVEL_OUTOF10: 10

## 2024-12-14 ASSESSMENT — PAIN DESCRIPTION - ORIENTATION
ORIENTATION: LEFT
ORIENTATION: LEFT

## 2024-12-14 ASSESSMENT — PAIN DESCRIPTION - LOCATION
LOCATION: HIP
LOCATION: LEG

## 2024-12-14 ASSESSMENT — PAIN DESCRIPTION - DESCRIPTORS
DESCRIPTORS: ACHING;DISCOMFORT
DESCRIPTORS: SHARP

## 2024-12-14 NOTE — DISCHARGE INSTR - COC
Continuity of Care Form    Patient Name: Toshia South   :  1945  MRN:  [id number]    Admit date:  12/10/2024  Discharge date:  ***    Code Status Order: Full Code   Advance Directives:   Advance Care Flowsheet Documentation             Admitting Physician:  Marin Coreas MD  PCP: Jong Moraes MD    Discharging Nurse: ***  Discharging Hospital Unit/Room#: 0421/0421-02  Discharging Unit Phone Number: ***    Emergency Contact:   Extended Emergency Contact Information  Primary Emergency Contact: BrannonJerrell martinez  Address: [address]  Home Phone: [phone]  Mobile Phone: [phone]  Relation: Spouse  Hearing or visual needs: None  Other needs: None  Preferred language: English   needed? No  Secondary Emergency Contact: Jose Eduardo South  Home Phone: [phone]  Relation: Child   needed? No    Past Surgical History:  Past Surgical History:   Procedure Laterality Date    BACK SURGERY  1998    spinal cord stimulator    BACK SURGERY  1999    infusion pump insertion    BACK SURGERY  10/23/2003    spinal cord stimulator removed    BACK SURGERY  10/23/2003    new infusion pump placed    BACK SURGERY  2017    replaced infusion pump    CARPAL TUNNEL RELEASE Right 1999    CARPAL TUNNEL RELEASE Left 1999    CARPAL TUNNEL RELEASE Right 2002    CARPAL TUNNEL RELEASE Left 2003    CERVICAL DISC SURGERY  10/25/2004    anterior cervical diskectomy C7-T1    CERVICAL DISC SURGERY  2004    anterior cervical discectomy C6-7 (complicated by damaged nerve to vocal cord)    CRANIOTOMY Left 2022    LEFT CRANIOTOMY FOR SUBDURAL HEMATOMA EVACUATION performed by Yessica Lewis DO at [facility] OR    FRACTURE SURGERY Left 2018    ORIF wrist    HUMERUS FRACTURE SURGERY Right 10/03/2010    HYSTERECTOMY (CERVIX STATUS UNKNOWN)  1991    KNEE ARTHROSCOPY Right 2011    KNEE SURGERY Right

## 2024-12-14 NOTE — DISCHARGE INSTRUCTIONS
You are brought to the hospital after being found unresponsive     You were intubated and sedated and were admitted to ICU.    Your workup showed that you had pneumonia.    You were treated with IV antibiotic and steroids.    Please ensure to complete prescribed course of prednisone    Please ensure you take your medications as prescribed    Please ensure follow up with your PCP in 5 to 7 days, make appointment with the number provided. Make sure to ask them about the medications you have been taking and prescribed.  Please ensure to follow-up with your lung doctor and cardiologist, make appointment with the provided numbers.    If you have any more shortness of breath, cough , fever, or any other severe symptoms, make sure to return to the ED or call 911.

## 2024-12-14 NOTE — PROGRESS NOTES
[facility]  Internal Medicine Teaching Residency Program  Inpatient Daily Progress Note  ______________________________________________________________________________    Patient: Toshia South  YOB: 1945   MRN:[id number]    Acct: [id number]     Room: 0421/0421-02  Admit date: 12/10/2024  Today's date: 12/14/24  Number of days in the hospital: 4    SUBJECTIVE   Admitting Diagnosis: Acute on chronic respiratory failure  CC: Acute metabolic encephalopathy    Patient examined at bedside today.  Labs and chart reviewed.  Vitals reviewed.    No acute events reported overnight.  Patient has no concerns or complaints.  Patient is saturating well at 4 L.  Patient states that her appetite is better, she is having normal bowel movements.  She denies any chest pain or shortness of.  Patient is eagerly waiting to go home today.    Patient had slight epistaxis due to nasal cannula placement    Review of Systems   Constitutional:  Negative for activity change, appetite change, fatigue and fever.   HENT: Negative.     Eyes: Negative.    Respiratory:  Negative for cough, choking, chest tightness and shortness of breath.    Cardiovascular:  Negative for chest pain.   Gastrointestinal:  Negative for abdominal pain, constipation, diarrhea, nausea and vomiting.   Endocrine: Negative.    Genitourinary: Negative.    Musculoskeletal: Negative.    Skin: Negative.    Allergic/Immunologic: Negative.    Neurological:  Negative for weakness, light-headedness and headaches.   Hematological: Negative.    Psychiatric/Behavioral: Negative.         BRIEF HISTORY     71-year-old female known to have past medical history of:     Pulmonary fibrosis  COPD  Bronchiectasis  Chronic hypoxic respiratory failure on 4 L oxygen at home  Recurrent multifocal pneumonia  Paroxysmal atrial fibrillation on Xarelto  Diastolic heart failure - HFpEF  GERD  Osteoporosis   Sleep apnea   Left hip  138 141   K 3.5* 3.6* 3.3*    103 102   CO2 28 28 30   BUN 15 17 22   CREATININE 0.4* 0.6 0.6     BNP: No results for input(s): \"BNP\" in the last 72 hours.  PT/INR: No results for input(s): \"PROTIME\", \"INR\" in the last 72 hours.  APTT:   Recent Labs     12/11/24  1256 12/11/24 2028 12/12/24  0353   APTT 38.4* 48.2* 80.6*     CARDIAC ENZYMES: No results for input(s): \"CKMB\", \"CKMBINDEX\", \"TROPONINI\" in the last 72 hours.    Invalid input(s): \"CKTOTAL;3\"  FASTING LIPID PANEL:  Lab Results   Component Value Date    CHOL 181 04/24/2024    HDL 63 04/24/2024    TRIG 126 06/13/2024     LIVER PROFILE: No results for input(s): \"AST\", \"ALT\", \"BILIDIR\", \"BILITOT\", \"ALKPHOS\" in the last 72 hours.    Invalid input(s): \"ALB\"   MICROBIOLOGY:   Lab Results   Component Value Date/Time    CULTURE NO GROWTH 12/10/2024 05:58 PM       Imaging:    FL MODIFIED BARIUM SWALLOW W VIDEO    Result Date: 12/12/2024  Aspiration of thin liquid. Zenker's diverticulum. Please see separate speech pathology report for full discussion of findings and recommendations.     XR CHEST PORTABLE    Result Date: 12/10/2024  1. Endotracheal tube approximately 1.8 cm above the alejandro. 2. Bilateral pulmonary infiltrates redemonstrated and slightly denser in the right chest.     CT CHEST W CONTRAST    Result Date: 12/10/2024  1.  Multifocal consolidation most compatible with pneumonia is similar to 9/20 9/24 CT. 2.  Small right pleural effusion. 3.  No pulmonary embolism identified in the main, right/left or lobar branches of the pulmonary arteries.  Segmental and subsegmental branch evaluation is limited by motion artifact.     XR CHEST PORTABLE    Result Date: 12/10/2024  1.  Interval intubation.  Endotracheal tube tip projects over the right mainstem bronchus recommend retraction. 2.  Multifocal pneumonia unchanged.     XR CHEST PORTABLE    Result Date: 12/10/2024  Patchy interstitial and airspace opacities also evident on prior but has worsened in the

## 2024-12-14 NOTE — PLAN OF CARE
Problem: Chronic Conditions and Co-morbidities  Goal: Patient's chronic conditions and co-morbidity symptoms are monitored and maintained or improved  12/14/2024 0713 by Marcy Myles RN  Outcome: Progressing  12/13/2024 2210 by Pamela Bonilla RN  Outcome: Progressing     Problem: Discharge Planning  Goal: Discharge to home or other facility with appropriate resources  12/14/2024 0713 by Marcy Myles RN  Outcome: Progressing  12/13/2024 2210 by Pamela Bonilla RN  Outcome: Progressing     Problem: Pain  Goal: Verbalizes/displays adequate comfort level or baseline comfort level  12/14/2024 0713 by Marcy Myles RN  Outcome: Progressing  12/13/2024 2210 by Pamela Bonilla RN  Outcome: Progressing

## 2024-12-14 NOTE — PLAN OF CARE
Problem: Chronic Conditions and Co-morbidities  Goal: Patient's chronic conditions and co-morbidity symptoms are monitored and maintained or improved  Outcome: Progressing     Problem: Discharge Planning  Goal: Discharge to home or other facility with appropriate resources  Outcome: Progressing     Problem: Pain  Goal: Verbalizes/displays adequate comfort level or baseline comfort level  Outcome: Progressing     Problem: Skin/Tissue Integrity  Goal: Absence of new skin breakdown  Description: 1.  Monitor for areas of redness and/or skin breakdown  2.  Assess vascular access sites hourly  3.  Every 4-6 hours minimum:  Change oxygen saturation probe site  4.  Every 4-6 hours:  If on nasal continuous positive airway pressure, respiratory therapy assess nares and determine need for appliance change or resting period.  Outcome: Progressing     Problem: Safety - Adult  Goal: Free from fall injury  Outcome: Progressing     Problem: ABCDS Injury Assessment  Goal: Absence of physical injury  Outcome: Progressing     Problem: Respiratory - Adult  Goal: Achieves optimal ventilation and oxygenation  12/13/2024 2210 by Pamela Bonilla RN  Outcome: Progressing  12/13/2024 2058 by Jayda King RCP  Outcome: Progressing     Problem: Nutrition Deficit:  Goal: Optimize nutritional status  Outcome: Progressing

## 2024-12-17 ENCOUNTER — CARE COORDINATION (OUTPATIENT)
Dept: CARE COORDINATION | Age: 79
End: 2024-12-17

## 2024-12-17 DIAGNOSIS — J96.01 ACUTE RESPIRATORY FAILURE WITH HYPOXIA: ICD-10-CM

## 2024-12-17 DIAGNOSIS — J18.9 MULTIFOCAL PNEUMONIA: Primary | ICD-10-CM

## 2024-12-17 PROCEDURE — 1111F DSCHRG MED/CURRENT MED MERGE: CPT | Performed by: INTERNAL MEDICINE

## 2024-12-17 NOTE — CARE COORDINATION
Ambulatory Care Coordination Note     2024      Patient Current Location:  Home: [address] [id number]     ACM contacted the patient by telephone. Verified name and  with patient as identifiers.         ACM: Delphine Gao RN     Challenges to be reviewed by the provider   Additional needs identified to be addressed with provider No  none               Method of communication with provider: none.    Utilization: Has the patient been discharged from the hospital since your last call? yes -   Call within 2 business days of discharge: Yes    Patient: Toshia South    Patient : 1945   MRN: [id number]    Reason for Admission: pneumonia   Discharge Date: 24  RURS: Readmission Risk Score: 25.3      Last Discharge Facility       Date Complaint Diagnosis Description Type Department Provider    12/10/24  Acute pulmonary edema ... Admission (Discharged) [facility] Boy Dennis MD    12/10/24 Shortness of Breath Recurrent pneumonia ... ED (TRANSFER) [facility] Leeanna Galvan, DO            Was this an external facility discharge? No    Ambulatory Care Manager reviewed discharge instructions and medical action plan with patient. The patient was given an opportunity to ask questions; all questions answered at this time.. The patient verbalized understanding.   Were discharge instructions available to patient? Yes.   Reviewed appropriate site of care based on symptoms and resources available to patient including: PCP  Home health. The patient agrees to contact the primary care provider and/or specialist office for questions related to their healthcare.     Patients top risk factors for readmission: caregiver stress and transportation    Hospital follow up appointment: Discussed follow up appointments. Patient has hospital follow up appointment scheduled within 14 days of discharge.     Care Summary Note: Spoke with Toshia. Reports she is doing well since being back home. Reports biggest

## 2024-12-19 ENCOUNTER — HOSPITAL ENCOUNTER (OUTPATIENT)
Age: 79
Setting detail: SPECIMEN
Discharge: HOME | End: 2024-12-19
Payer: MEDICARE

## 2024-12-19 LAB
ANION GAP SERPL CALCULATED.3IONS-SCNC: 7 MMOL/L (ref 9–16)
BUN SERPL-MCNC: 13 MG/DL (ref 8–23)
BUN/CREAT SERPL: 22 (ref 9–20)
CALCIUM SERPL-MCNC: 9.5 MG/DL (ref 8.6–10.4)
CHLORIDE SERPL-SCNC: 101 MMOL/L (ref 98–107)
CO2 SERPL-SCNC: 32 MMOL/L (ref 20–31)
CREAT SERPL-MCNC: 0.6 MG/DL (ref 0.5–0.9)
GFR, ESTIMATED: >90 ML/MIN/1.73M2
GLUCOSE SERPL-MCNC: 98 MG/DL (ref 74–99)
POTASSIUM SERPL-SCNC: 4.4 MMOL/L (ref 3.7–5.3)
SODIUM SERPL-SCNC: 140 MMOL/L (ref 136–145)

## 2024-12-19 PROCEDURE — 80048 BASIC METABOLIC PNL TOTAL CA: CPT

## 2024-12-20 ENCOUNTER — HOSPITAL ENCOUNTER (OUTPATIENT)
Dept: CT IMAGING | Age: 79
Discharge: HOME | End: 2024-12-22
Attending: INTERNAL MEDICINE
Payer: MEDICARE

## 2024-12-20 DIAGNOSIS — J18.9 PNEUMONIA OF RIGHT LOWER LOBE DUE TO INFECTIOUS ORGANISM: ICD-10-CM

## 2024-12-20 PROCEDURE — 71250 CT THORAX DX C-: CPT

## 2024-12-21 NOTE — RESULT ENCOUNTER NOTE
Please inform the pt that the test is ok and f/u as scheduled. Ty [Follow-Up] : a follow-up visit [Abnormal CXR/ Chest CT] : an abnormal CXR/ chest CT [COPD] : COPD

## 2025-01-07 ENCOUNTER — CARE COORDINATION (OUTPATIENT)
Dept: CARE COORDINATION | Age: 80
End: 2025-01-07

## 2025-01-08 ENCOUNTER — HOSPITAL ENCOUNTER (OUTPATIENT)
Dept: PREADMISSION TESTING | Age: 80
Discharge: HOME | End: 2025-01-12
Attending: ORTHOPAEDIC SURGERY | Admitting: ORTHOPAEDIC SURGERY
Payer: MEDICARE

## 2025-01-08 ENCOUNTER — TELEPHONE (OUTPATIENT)
Dept: PREADMISSION TESTING | Age: 80
End: 2025-01-08

## 2025-01-08 ENCOUNTER — HOSPITAL ENCOUNTER (OUTPATIENT)
Dept: GENERAL RADIOLOGY | Age: 80
Discharge: HOME | End: 2025-01-10
Attending: ORTHOPAEDIC SURGERY
Payer: MEDICARE

## 2025-01-08 VITALS
HEART RATE: 93 BPM | RESPIRATION RATE: 20 BRPM | HEIGHT: 65 IN | TEMPERATURE: 98.1 F | SYSTOLIC BLOOD PRESSURE: 131 MMHG | DIASTOLIC BLOOD PRESSURE: 60 MMHG | OXYGEN SATURATION: 97 % | BODY MASS INDEX: 23.32 KG/M2 | WEIGHT: 140 LBS

## 2025-01-08 LAB
ALBUMIN SERPL-MCNC: 3.8 G/DL (ref 3.5–5.2)
ALBUMIN/GLOB SERPL: 1.2 {RATIO} (ref 1–2.5)
ALP SERPL-CCNC: 82 U/L (ref 35–104)
ALT SERPL-CCNC: 9 U/L (ref 10–35)
ANION GAP SERPL CALCULATED.3IONS-SCNC: 8 MMOL/L (ref 9–16)
AST SERPL-CCNC: 18 U/L (ref 10–35)
BASOPHILS # BLD: 0.03 K/UL (ref 0–0.2)
BASOPHILS NFR BLD: 0 % (ref 0–2)
BILIRUB SERPL-MCNC: 0.3 MG/DL (ref 0–1.2)
BUN SERPL-MCNC: 13 MG/DL (ref 8–23)
BUN/CREAT SERPL: 26 (ref 9–20)
CALCIUM SERPL-MCNC: 9.7 MG/DL (ref 8.6–10.4)
CHLORIDE SERPL-SCNC: 101 MMOL/L (ref 98–107)
CO2 SERPL-SCNC: 32 MMOL/L (ref 20–31)
CREAT SERPL-MCNC: 0.5 MG/DL (ref 0.5–0.9)
EOSINOPHIL # BLD: 0.2 K/UL (ref 0–0.44)
EOSINOPHILS RELATIVE PERCENT: 3 % (ref 1–4)
ERYTHROCYTE [DISTWIDTH] IN BLOOD BY AUTOMATED COUNT: 14.7 % (ref 11.8–14.4)
GFR, ESTIMATED: >90 ML/MIN/1.73M2
GLUCOSE SERPL-MCNC: 93 MG/DL (ref 74–99)
HCT VFR BLD AUTO: 36.2 % (ref 36.3–47.1)
HGB BLD-MCNC: 11.7 G/DL (ref 11.9–15.1)
IMM GRANULOCYTES # BLD AUTO: <0.03 K/UL (ref 0–0.3)
IMM GRANULOCYTES NFR BLD: 0 %
LYMPHOCYTES NFR BLD: 1.07 K/UL (ref 1.1–3.7)
LYMPHOCYTES RELATIVE PERCENT: 16 % (ref 24–43)
MCH RBC QN AUTO: 30.7 PG (ref 25.2–33.5)
MCHC RBC AUTO-ENTMCNC: 32.3 G/DL (ref 28.4–34.8)
MCV RBC AUTO: 95 FL (ref 82.6–102.9)
MONOCYTES NFR BLD: 0.52 K/UL (ref 0.1–1.2)
MONOCYTES NFR BLD: 8 % (ref 3–12)
NEUTROPHILS NFR BLD: 73 % (ref 36–65)
NEUTS SEG NFR BLD: 5.03 K/UL (ref 1.5–8.1)
NRBC BLD-RTO: 0 PER 100 WBC
PLATELET # BLD AUTO: 301 K/UL (ref 138–453)
PMV BLD AUTO: 9.8 FL (ref 8.1–13.5)
POTASSIUM SERPL-SCNC: 3.9 MMOL/L (ref 3.7–5.3)
PROT SERPL-MCNC: 6.9 G/DL (ref 6.6–8.7)
RBC # BLD AUTO: 3.81 M/UL (ref 3.95–5.11)
SODIUM SERPL-SCNC: 141 MMOL/L (ref 136–145)
WBC OTHER # BLD: 6.9 K/UL (ref 3.5–11.3)

## 2025-01-08 PROCEDURE — 80053 COMPREHEN METABOLIC PANEL: CPT

## 2025-01-08 PROCEDURE — 85025 COMPLETE CBC W/AUTO DIFF WBC: CPT

## 2025-01-08 PROCEDURE — 71046 X-RAY EXAM CHEST 2 VIEWS: CPT

## 2025-01-08 PROCEDURE — 36415 COLL VENOUS BLD VENIPUNCTURE: CPT

## 2025-01-08 PROCEDURE — 87641 MR-STAPH DNA AMP PROBE: CPT

## 2025-01-08 RX ORDER — ACETAMINOPHEN 325 MG/1
650 TABLET ORAL ONCE
OUTPATIENT
Start: 2025-01-08 | End: 2025-01-08

## 2025-01-08 RX ORDER — DIMENHYDRINATE 50 MG
50 TABLET ORAL
OUTPATIENT
Start: 2025-01-08 | End: 2025-01-08

## 2025-01-08 RX ORDER — CEFAZOLIN SODIUM/WATER 2 G/20 ML
2000 SYRINGE (ML) INTRAVENOUS ONCE
Status: CANCELLED | OUTPATIENT
Start: 2025-01-08 | End: 2025-01-08

## 2025-01-08 ASSESSMENT — PAIN DESCRIPTION - LOCATION: LOCATION: HIP

## 2025-01-08 ASSESSMENT — PAIN SCALES - GENERAL: PAINLEVEL_OUTOF10: 10

## 2025-01-08 ASSESSMENT — PAIN DESCRIPTION - ORIENTATION: ORIENTATION: LEFT

## 2025-01-08 ASSESSMENT — PAIN DESCRIPTION - PAIN TYPE: TYPE: CHRONIC PAIN

## 2025-01-08 NOTE — PROGRESS NOTES
[facility]   Preadmission Testing    Name: Toshia South  : 1945  Patient Phone: [phone] (home)     Procedure Left MARYJO  Date of Procedure: 25  Surgeon: Josemanuel Larsen MD    Ht:  165.1 cm (5' 5\")  Wt: 63.5 kg (140 lb)  Wt method: Stated    Allergies:   Allergies   Allergen Reactions    Seasonal Other (See Comments)     Nasal congestion, runny nose       Peanut allergy: No         Vitals:    25 0919   BP: 131/60   Pulse: 93   Resp: 20   Temp: 98.1 °F (36.7 °C)   SpO2: 97%       No LMP recorded. Patient has had a hysterectomy.    Do you take blood thinners?   [x] Yes    [] No         Instructed to stop blood thinners prior to procedure?    [x] Yes    [] No      [] N/A   Do you have sleep apnea?   [x] Yes    [] No     Instructed to bring CPAP machine?   [x] Yes    [] No    [] N/A   Do you have acid reflux ?   [] Yes    [x] No     Do you have  hiatal hernia?   [] Yes    [x] No    Do you ever experience motion sickness?   [] Yes    [x] No     Have you had a respiratory infection or sore throat in last 4 weeks before surgery?    [] Yes    [x] No     Do you have poorly controlled asthma or COPD?   [] Yes    [x] No     Do you have a history of angina in the last month or symptomatic arrhythmia?   [] Yes    [x] No     Do you have significant central nervous system disease?   [] Yes    [x] No     Have you had an EKG, labs, or chest xray in last 12 months?  If yes provide copies to anesthesia   [x] Yes    [] No       [x] Lab    [x] EKG    [x] CXR     Have you had a stress test?     [x] Yes    [] No    When/where:Unknown    Was it normal?    [] Yes    [x] No   Do you or your family have a history of Malignant Hyperthermia? [] Yes    [x] No     Patient instructed on:   [x] NPO Status   [x] Meds to Take Day of Surgery  [x] Ride Home  [x]No Jewelry/Contact Lenses/Dentures day of surgery   [x] Chlorhexidene             PAT Call/Visit Questions  Person Interviewed: Sary  Relationship to Patient:

## 2025-01-08 NOTE — CARE COORDINATION
Ambulatory Care Coordination Note     2025      Patient Current Location:  Home:  [address] [place] [id number]     ACM contacted the patient by telephone. Verified name and  with patient as identifiers.         ACM: Delphine Gao RN     Challenges to be reviewed by the provider   Additional needs identified to be addressed with provider No  none               Method of communication with provider: none.    Utilization: Patient has not had any utilization since our last call.     Care Summary Note: Spoke briefly with Toshia. Reports she is doing ok. Going to complete some testing for her upcoming surgery. Looking forward to this surgery. Has been battling a cough for several weeks now, has been seen in PCP office for this matter. Getting chest x-ray done today.       Medications Reviewed:   Patient denies any changes with medications and reports taking all medications as prescribed.    Advance Care Planning:   Patient declined education     PCP/Specialist follow up:   Future Appointments         Provider Specialty Dept Phone    2025 10:00 AM Yris Coyne PA-C Cardiology [phone]    2025 11:00 AM Boy Dennis MD Pulmonology [phone]    2025 2:15 PM Boy Dennis MD Pulmonology [phone]            Follow Up:   Plan for next ACM outreach in approximately 1 week to complete:  - disease specific assessments  - medication review   - goal progression  - education .   Patient  is agreeable to this plan.

## 2025-01-08 NOTE — TELEPHONE ENCOUNTER
Patient is having a Left MARYJO with Dr. Larsen on 1/28/25. Patient had pneumonia in December, was intubated. She is on O2 4L/NC. She is scheduled with Dr. Miner on 1/20/25. Please review, thank you.

## 2025-01-09 LAB
MRSA, DNA, NASAL: ABNORMAL
SPECIMEN DESCRIPTION: ABNORMAL

## 2025-01-15 ENCOUNTER — ANESTHESIA EVENT (OUTPATIENT)
Dept: OPERATING ROOM | Age: 80
End: 2025-01-15
Payer: MEDICARE

## 2025-01-16 ENCOUNTER — TELEPHONE (OUTPATIENT)
Dept: PREADMISSION TESTING | Age: 80
End: 2025-01-16

## 2025-01-16 ENCOUNTER — CARE COORDINATION (OUTPATIENT)
Dept: CARE COORDINATION | Age: 80
End: 2025-01-16

## 2025-01-16 RX ORDER — VANCOMYCIN 1 G/200ML
15 INJECTION, SOLUTION INTRAVENOUS ONCE
OUTPATIENT
Start: 2025-01-16 | End: 2025-01-16

## 2025-01-16 NOTE — TELEPHONE ENCOUNTER
Patient is scheduled with Dr. Larsen on 1/28/25 left MARYJO. She had a positive MRSA nares screening. Her CXR was abnormal also. Please review chart and advise. Thank you.

## 2025-01-16 NOTE — CARE COORDINATION
Ambulatory Care Coordination Note     2025      Patient Current Location:  Home:  [address] [place] [id number]     ACM contacted the patient by telephone. Verified name and  with patient as identifiers.         ACM: Delphine Gao RN     Challenges to be reviewed by the provider   Additional needs identified to be addressed with provider No  none               Method of communication with provider: none.    Utilization: Patient has not had any utilization since our last call.     Care Summary Note: Spoke with Sary. Reports she is doing ok. Counting down the days until her hip surgery. Constant pain to hip. Denied new needs at this time. Has cardiology apt set up for next week. Pre admission testing done.     Medications Reviewed:   Patient denies any changes with medications and reports taking all medications as prescribed.    PCP/Specialist follow up:   Future Appointments         Provider Specialty Dept Phone    2025 10:00 AM Yris Coyne PA-C Cardiology [phone]    2025 11:00 AM Boy Dennis MD Pulmonology [phone]    2025 10:30 AM Hermila Gabriel APRN - CNP Internal Medicine [phone]    2025 2:15 PM Boy Dennis MD Pulmonology [phone]            Follow Up:   Plan for next ACM outreach in approximately 2 weeks to complete:  - disease specific assessments  - medication review   - goal progression  - education   - post hip .   Patient  is agreeable to this plan.

## 2025-01-20 ENCOUNTER — OFFICE VISIT (OUTPATIENT)
Dept: CARDIOLOGY | Age: 80
End: 2025-01-20
Payer: MEDICARE

## 2025-01-20 ENCOUNTER — HOSPITAL ENCOUNTER (OUTPATIENT)
Age: 80
Discharge: HOME | End: 2025-01-22
Payer: MEDICARE

## 2025-01-20 ENCOUNTER — HOSPITAL ENCOUNTER (OUTPATIENT)
Age: 80
Discharge: HOME | End: 2025-01-20
Payer: MEDICARE

## 2025-01-20 ENCOUNTER — TELEPHONE (OUTPATIENT)
Dept: CARDIOLOGY | Age: 80
End: 2025-01-20

## 2025-01-20 ENCOUNTER — HOSPITAL ENCOUNTER (OUTPATIENT)
Dept: GENERAL RADIOLOGY | Age: 80
Discharge: HOME | End: 2025-01-22
Payer: MEDICARE

## 2025-01-20 ENCOUNTER — OFFICE VISIT (OUTPATIENT)
Dept: PULMONOLOGY | Age: 80
End: 2025-01-20
Payer: MEDICARE

## 2025-01-20 VITALS
HEART RATE: 86 BPM | BODY MASS INDEX: 28.22 KG/M2 | HEIGHT: 59 IN | SYSTOLIC BLOOD PRESSURE: 113 MMHG | RESPIRATION RATE: 20 BRPM | WEIGHT: 140 LBS | OXYGEN SATURATION: 85 % | TEMPERATURE: 97.6 F | DIASTOLIC BLOOD PRESSURE: 54 MMHG

## 2025-01-20 VITALS
RESPIRATION RATE: 18 BRPM | DIASTOLIC BLOOD PRESSURE: 57 MMHG | HEART RATE: 89 BPM | WEIGHT: 140 LBS | HEIGHT: 59 IN | SYSTOLIC BLOOD PRESSURE: 108 MMHG | BODY MASS INDEX: 28.22 KG/M2

## 2025-01-20 DIAGNOSIS — J44.9 STAGE 3 SEVERE COPD BY GOLD CLASSIFICATION (HCC): ICD-10-CM

## 2025-01-20 DIAGNOSIS — Z01.818 PRE-OP EVALUATION: ICD-10-CM

## 2025-01-20 DIAGNOSIS — I50.42 CHRONIC COMBINED SYSTOLIC AND DIASTOLIC CONGESTIVE HEART FAILURE (HCC): ICD-10-CM

## 2025-01-20 DIAGNOSIS — Z01.818 PRE-OP EVALUATION: Primary | ICD-10-CM

## 2025-01-20 DIAGNOSIS — K21.9 GASTROESOPHAGEAL REFLUX DISEASE, UNSPECIFIED WHETHER ESOPHAGITIS PRESENT: ICD-10-CM

## 2025-01-20 DIAGNOSIS — J96.11 CHRONIC HYPOXEMIC RESPIRATORY FAILURE: ICD-10-CM

## 2025-01-20 DIAGNOSIS — I25.10 MILD CAD: ICD-10-CM

## 2025-01-20 DIAGNOSIS — K44.9 HIATAL HERNIA: ICD-10-CM

## 2025-01-20 DIAGNOSIS — Z86.79 HISTORY OF ATRIAL FIBRILLATION: ICD-10-CM

## 2025-01-20 DIAGNOSIS — I42.8 NICM (NONISCHEMIC CARDIOMYOPATHY) (HCC): ICD-10-CM

## 2025-01-20 DIAGNOSIS — J44.9 CHRONIC OBSTRUCTIVE PULMONARY DISEASE, UNSPECIFIED COPD TYPE (HCC): ICD-10-CM

## 2025-01-20 DIAGNOSIS — J18.9 RECURRENT PNEUMONIA: ICD-10-CM

## 2025-01-20 DIAGNOSIS — R06.02 SOB (SHORTNESS OF BREATH): ICD-10-CM

## 2025-01-20 DIAGNOSIS — Z86.16 HISTORY OF COVID-19: ICD-10-CM

## 2025-01-20 DIAGNOSIS — J84.10 PULMONARY FIBROSIS (HCC): ICD-10-CM

## 2025-01-20 DIAGNOSIS — J47.9 BRONCHIECTASIS WITHOUT COMPLICATION (HCC): Primary | ICD-10-CM

## 2025-01-20 DIAGNOSIS — E78.2 MIXED HYPERLIPIDEMIA: ICD-10-CM

## 2025-01-20 DIAGNOSIS — Z79.01 CHRONIC ANTICOAGULATION: ICD-10-CM

## 2025-01-20 DIAGNOSIS — J18.9 MULTIFOCAL PNEUMONIA: ICD-10-CM

## 2025-01-20 DIAGNOSIS — J96.11 CHRONIC RESPIRATORY FAILURE WITH HYPOXIA: ICD-10-CM

## 2025-01-20 DIAGNOSIS — I50.82 BIVENTRICULAR CONGESTIVE HEART FAILURE (HCC): ICD-10-CM

## 2025-01-20 DIAGNOSIS — I48.0 PAF (PAROXYSMAL ATRIAL FIBRILLATION) (HCC): ICD-10-CM

## 2025-01-20 LAB — BNP SERPL-MCNC: 169 PG/ML (ref 0–450)

## 2025-01-20 PROCEDURE — 1123F ACP DISCUSS/DSCN MKR DOCD: CPT | Performed by: INTERNAL MEDICINE

## 2025-01-20 PROCEDURE — 99214 OFFICE O/P EST MOD 30 MIN: CPT | Performed by: INTERNAL MEDICINE

## 2025-01-20 PROCEDURE — G8427 DOCREV CUR MEDS BY ELIG CLIN: HCPCS | Performed by: INTERNAL MEDICINE

## 2025-01-20 PROCEDURE — 1090F PRES/ABSN URINE INCON ASSESS: CPT | Performed by: PHYSICIAN ASSISTANT

## 2025-01-20 PROCEDURE — 93005 ELECTROCARDIOGRAM TRACING: CPT | Performed by: INTERNAL MEDICINE

## 2025-01-20 PROCEDURE — G8417 CALC BMI ABV UP PARAM F/U: HCPCS | Performed by: PHYSICIAN ASSISTANT

## 2025-01-20 PROCEDURE — G8417 CALC BMI ABV UP PARAM F/U: HCPCS | Performed by: INTERNAL MEDICINE

## 2025-01-20 PROCEDURE — 1159F MED LIST DOCD IN RCRD: CPT | Performed by: PHYSICIAN ASSISTANT

## 2025-01-20 PROCEDURE — G8427 DOCREV CUR MEDS BY ELIG CLIN: HCPCS | Performed by: PHYSICIAN ASSISTANT

## 2025-01-20 PROCEDURE — G8399 PT W/DXA RESULTS DOCUMENT: HCPCS | Performed by: PHYSICIAN ASSISTANT

## 2025-01-20 PROCEDURE — 99214 OFFICE O/P EST MOD 30 MIN: CPT | Performed by: PHYSICIAN ASSISTANT

## 2025-01-20 PROCEDURE — 83880 ASSAY OF NATRIURETIC PEPTIDE: CPT

## 2025-01-20 PROCEDURE — 1036F TOBACCO NON-USER: CPT | Performed by: INTERNAL MEDICINE

## 2025-01-20 PROCEDURE — 3023F SPIROM DOC REV: CPT | Performed by: INTERNAL MEDICINE

## 2025-01-20 PROCEDURE — 1090F PRES/ABSN URINE INCON ASSESS: CPT | Performed by: INTERNAL MEDICINE

## 2025-01-20 PROCEDURE — 99211 OFF/OP EST MAY X REQ PHY/QHP: CPT | Performed by: PHYSICIAN ASSISTANT

## 2025-01-20 PROCEDURE — 1159F MED LIST DOCD IN RCRD: CPT | Performed by: INTERNAL MEDICINE

## 2025-01-20 PROCEDURE — 1123F ACP DISCUSS/DSCN MKR DOCD: CPT | Performed by: PHYSICIAN ASSISTANT

## 2025-01-20 PROCEDURE — G8399 PT W/DXA RESULTS DOCUMENT: HCPCS | Performed by: INTERNAL MEDICINE

## 2025-01-20 PROCEDURE — 93010 ELECTROCARDIOGRAM REPORT: CPT | Performed by: INTERNAL MEDICINE

## 2025-01-20 PROCEDURE — 36415 COLL VENOUS BLD VENIPUNCTURE: CPT

## 2025-01-20 PROCEDURE — 1036F TOBACCO NON-USER: CPT | Performed by: PHYSICIAN ASSISTANT

## 2025-01-20 PROCEDURE — 71046 X-RAY EXAM CHEST 2 VIEWS: CPT

## 2025-01-20 PROCEDURE — 3023F SPIROM DOC REV: CPT | Performed by: PHYSICIAN ASSISTANT

## 2025-01-20 NOTE — PROGRESS NOTES
high, this can be stopped 5-7 days prior to the planned procedure but please restart this as soon as safely possible.    Anticoagulant Agent: I would suggest holding her Rivaroxaban (Xarelto) 2-3 days   Anticoagulation Bridging: I do not think that heparin/lovenox bridging would be necessary.  Additional Testing List: I ordered a echocardiogram to better assess for the etiology of this problem and to help guide future management.  Because of this problem, I think it would be snyder to better assess this problem with a two view chest X-ray and therefore I took the liberty of ordering this.   I also ordered a BNP.    Chronic combined heart failure:   Beta Blocker: Currently euvolemic.  Ejection fraction is preserved.  No significant CAD.  No prior history of myocardial infarction.  Heart rate is controlled without AV flo blocking agents.  She is also a poor candidate for beta-blocker therapy giving her reactive airway disease.  ACE Inibitor/ARB: Not indicated at this time.    Diuretics: Continue bumetinide (Bumex) 2 mg every morning.     Heart failure counseling: I told them to start wearing lower extremity compression stockings and I advised them to try and keep their legs up whenever possible and to limit salt in their diet.   Additional Testing List: None     History of Atrial Fibrillation: 10/23/2020 with RVR.   Currently maintaining sinus rhythm.  Beta Blocker: As above.  Stroke Risk: Her CHADS2-VASc score is greater than 2 (2.2% stroke risk)  Anticoagulation: Continue Riveroxiban (Xarelto) 20 mg daily. I also reminded her to watch for signs of blood in her stool or black tarry stools and stop the medication immediately if this develops as this could be life threatening.  History of extradural secondary to a fall.  No spontaneous bleeding.  No further falls.  Counseled patient extensively to use her walker or cane and ambulate carefully.    Shortness of Breath/COPD:  Chronic hypoxemic respiratory failure.  Follow

## 2025-01-20 NOTE — PROGRESS NOTES
congestive heart failure.  She has biventricular congestive heart failure. She reports some swelling of the legs, which improves with elevation. She is advised to monitor her salt and water intake to prevent fluid retention. She is advised to watch her diet and not to exceed 2 g of sodium per day.    5. Paroxysmal atrial fibrillation.    6. Bronchiectasis.    7. Hiatal hernia.  She reports no current symptoms of acid reflux.    8. Gastroesophageal Reflux Disease (GERD).  She reports no current symptoms of acid reflux.    9. Chronic hypoxemic respiratory failure.  She has chronic hypoxemic respiratory failure and uses a BiPAP machine at night and sometimes during the day. Her recent sleep study showed severe sleep apnea, and she is advised to continue using the BiPAP machine with a maximum IPAP of 24 cm of water and a minimum EPAP of 14 cm of water.    10. History of COVID-19 infection.    11. Severe sleep apnea.  Her recent sleep study showed severe sleep apnea, and she is advised to continue using the BiPAP machine with a maximum IPAP of 24 cm of water and a minimum EPAP of 14 cm of water.    12. Surgical clearance.  She is scheduled for hip surgery on 01/28/2025. She is advised to bring her BiPAP machine to the hospital and use it postoperatively to manage her sleep apnea, as pain and sedation medications could worsen the condition. A clearance letter for surgery has been sent to Dr. Larsen and Dr. Moraes.    Follow-up  The patient will follow up in 6 months.       Thank you for having us involved in the care of your patient. Please call us if you have any questions or concerns.  The patient (or guardian, if applicable) and other individuals in attendance with the patient were advised that Artificial Intelligence will be utilized during this visit to record, process the conversation to generate a clinical note, and support improvement of the AI technology. The patient (or guardian, if applicable) and other individuals

## 2025-01-20 NOTE — PATIENT INSTRUCTIONS
SURVEY:    Thank you for allowing us to care for you today.    You may be receiving a survey from [facility] regarding your visit today- electronically or via mail.      Please help us by completing the survey as this will provide the needed feedback to ensure we are providing the very best care for you and your family.    If you cannot score us a very good on any question, please call the office to discuss how we could have made your experience a very good one.    Thank you.       STAFF:    Viktoria Hidalgo, Heavenly GONSALEZ      CLINICAL STAFF:    Anamika CAMPOS, Charo GONSALEZ, Massiel GONSALEZ, Evelin CAMPOS

## 2025-01-20 NOTE — TELEPHONE ENCOUNTER
----- Message from Yris Coyne PA-C sent at 1/20/2025  2:55 PM EST -----  Please notify patient that their lab results are normal.   Please continue current treatment and follow up.

## 2025-01-21 ENCOUNTER — TELEPHONE (OUTPATIENT)
Dept: CARDIOLOGY | Age: 80
End: 2025-01-21

## 2025-01-21 NOTE — TELEPHONE ENCOUNTER
----- Message from Yris Coyne PA-C sent at 1/21/2025 12:18 PM EST -----  Please notify patient that their CXR results are normal.   Please continue current treatment and follow up.

## 2025-01-22 NOTE — TELEPHONE ENCOUNTER
Pt was intubated and admitted on Levophed to [facility] on 12/10/24 for pneumonia. She was transferred to [facility] Extubated on 12/11/24. Was on 4 L of home O2 and still requiring that.    She has a significant amount of cardiac and pulmonary history. Both clearances have been received.     She also had a repeat CXR on 1/20 and it was improved and clear:  IMPRESSION:  1. No acute cardiopulmonary process.  2. Moderate hiatal hernia.    Pulmonology clearance stated that she should bring her BiPAP machine with her and use it postoperatively. This is from the clearance note: \"She is scheduled for hip surgery on 01/28/2025. She is advised to bring her BiPAP machine to the hospital and use it postoperatively to manage her sleep apnea, as pain and sedation medications could worsen the condition. A clearance letter for surgery has been sent to Dr. Larsen and Dr. Moraes.\"    As long as patient is agreeable to a spinal anesthetic, we will be able to proceed safely as planned.

## 2025-01-24 ENCOUNTER — HOSPITAL ENCOUNTER (OUTPATIENT)
Age: 80
Discharge: HOME | End: 2025-01-26
Payer: MEDICARE

## 2025-01-24 VITALS
DIASTOLIC BLOOD PRESSURE: 71 MMHG | HEIGHT: 59 IN | BODY MASS INDEX: 28.22 KG/M2 | SYSTOLIC BLOOD PRESSURE: 116 MMHG | WEIGHT: 140 LBS

## 2025-01-24 DIAGNOSIS — Z79.01 CHRONIC ANTICOAGULATION: ICD-10-CM

## 2025-01-24 DIAGNOSIS — Z86.79 HISTORY OF ATRIAL FIBRILLATION: ICD-10-CM

## 2025-01-24 DIAGNOSIS — I25.10 MILD CAD: ICD-10-CM

## 2025-01-24 DIAGNOSIS — J44.9 STAGE 3 SEVERE COPD BY GOLD CLASSIFICATION (HCC): ICD-10-CM

## 2025-01-24 DIAGNOSIS — I50.42 CHRONIC COMBINED SYSTOLIC AND DIASTOLIC CONGESTIVE HEART FAILURE (HCC): ICD-10-CM

## 2025-01-24 DIAGNOSIS — Z01.818 PRE-OP EVALUATION: ICD-10-CM

## 2025-01-24 DIAGNOSIS — R06.02 SOB (SHORTNESS OF BREATH): ICD-10-CM

## 2025-01-24 PROCEDURE — 93308 TTE F-UP OR LMTD: CPT

## 2025-01-24 PROCEDURE — 93308 TTE F-UP OR LMTD: CPT | Performed by: FAMILY MEDICINE

## 2025-01-25 LAB
ECHO AV CUSP MM: 1.7 CM
ECHO BSA: 1.63 M2
ECHO LA AREA 2C: 20.5 CM2
ECHO LA AREA 4C: 17.1 CM2
ECHO LA MAJOR AXIS: 6.5 CM
ECHO LA MINOR AXIS: 6.1 CM
ECHO LA VOL BP: 46 ML (ref 22–52)
ECHO LA VOL MOD A2C: 56 ML (ref 22–52)
ECHO LA VOL MOD A4C: 36 ML (ref 22–52)
ECHO LA VOL/BSA BIPLANE: 29 ML/M2 (ref 16–34)
ECHO LA VOLUME INDEX MOD A2C: 35 ML/M2 (ref 16–34)
ECHO LA VOLUME INDEX MOD A4C: 23 ML/M2 (ref 16–34)
ECHO LV EDV A2C: 54 ML
ECHO LV EDV A4C: 76 ML
ECHO LV EDV INDEX A4C: 48 ML/M2
ECHO LV EDV NDEX A2C: 34 ML/M2
ECHO LV EJECTION FRACTION A2C: 59 %
ECHO LV EJECTION FRACTION A4C: 64 %
ECHO LV EJECTION FRACTION BIPLANE: 65 % (ref 55–100)
ECHO LV ESV A2C: 22 ML
ECHO LV ESV A4C: 28 ML
ECHO LV ESV INDEX A2C: 14 ML/M2
ECHO LV ESV INDEX A4C: 18 ML/M2
ECHO LV FRACTIONAL SHORTENING: 32 % (ref 28–44)
ECHO LV INTERNAL DIMENSION DIASTOLE INDEX: 2.41 CM/M2
ECHO LV INTERNAL DIMENSION DIASTOLIC: 3.8 CM (ref 3.9–5.3)
ECHO LV INTERNAL DIMENSION SYSTOLIC INDEX: 1.65 CM/M2
ECHO LV INTERNAL DIMENSION SYSTOLIC: 2.6 CM
ECHO LV IVSD: 0.8 CM (ref 0.6–0.9)
ECHO LV MASS 2D: 93.4 G (ref 67–162)
ECHO LV MASS INDEX 2D: 59.1 G/M2 (ref 43–95)
ECHO LV POSTERIOR WALL DIASTOLIC: 0.9 CM (ref 0.6–0.9)
ECHO LV RELATIVE WALL THICKNESS RATIO: 0.47

## 2025-01-27 ENCOUNTER — TELEPHONE (OUTPATIENT)
Dept: CARDIOLOGY | Age: 80
End: 2025-01-27

## 2025-01-27 NOTE — TELEPHONE ENCOUNTER
----- Message from Yris Coyne PA-C sent at 1/27/2025  8:56 AM EST -----  Please let them know their echo looks good, will discuss at follow up appointment. Thanks.

## 2025-01-28 ENCOUNTER — APPOINTMENT (OUTPATIENT)
Dept: GENERAL RADIOLOGY | Age: 80
End: 2025-01-28
Attending: ORTHOPAEDIC SURGERY
Payer: MEDICARE

## 2025-01-28 ENCOUNTER — HOSPITAL ENCOUNTER (OUTPATIENT)
Age: 80
Setting detail: OBSERVATION
Discharge: HOME | End: 2025-01-29
Attending: ORTHOPAEDIC SURGERY | Admitting: ORTHOPAEDIC SURGERY
Payer: MEDICARE

## 2025-01-28 ENCOUNTER — ANESTHESIA (OUTPATIENT)
Dept: OPERATING ROOM | Age: 80
End: 2025-01-28
Payer: MEDICARE

## 2025-01-28 PROBLEM — M16.12 PRIMARY OSTEOARTHRITIS OF LEFT HIP: Status: ACTIVE | Noted: 2025-01-28

## 2025-01-28 PROCEDURE — 6360000002 HC RX W HCPCS: Performed by: ORTHOPAEDIC SURGERY

## 2025-01-28 PROCEDURE — 3600000005 HC SURGERY LEVEL 5 BASE: Performed by: ORTHOPAEDIC SURGERY

## 2025-01-28 PROCEDURE — G0378 HOSPITAL OBSERVATION PER HR: HCPCS

## 2025-01-28 PROCEDURE — 2720000010 HC SURG SUPPLY STERILE: Performed by: ORTHOPAEDIC SURGERY

## 2025-01-28 PROCEDURE — 3600000015 HC SURGERY LEVEL 5 ADDTL 15MIN: Performed by: ORTHOPAEDIC SURGERY

## 2025-01-28 PROCEDURE — 2709999900 HC NON-CHARGEABLE SUPPLY: Performed by: ORTHOPAEDIC SURGERY

## 2025-01-28 PROCEDURE — 51798 US URINE CAPACITY MEASURE: CPT

## 2025-01-28 PROCEDURE — C1776 JOINT DEVICE (IMPLANTABLE): HCPCS | Performed by: ORTHOPAEDIC SURGERY

## 2025-01-28 PROCEDURE — 2580000003 HC RX 258: Performed by: ORTHOPAEDIC SURGERY

## 2025-01-28 PROCEDURE — 2500000003 HC RX 250 WO HCPCS: Performed by: ORTHOPAEDIC SURGERY

## 2025-01-28 PROCEDURE — 76942 ECHO GUIDE FOR BIOPSY: CPT | Performed by: NURSE ANESTHETIST, CERTIFIED REGISTERED

## 2025-01-28 PROCEDURE — 94640 AIRWAY INHALATION TREATMENT: CPT

## 2025-01-28 PROCEDURE — 3700000001 HC ADD 15 MINUTES (ANESTHESIA): Performed by: ORTHOPAEDIC SURGERY

## 2025-01-28 PROCEDURE — 6360000002 HC RX W HCPCS: Performed by: NURSE ANESTHETIST, CERTIFIED REGISTERED

## 2025-01-28 PROCEDURE — 2700000000 HC OXYGEN THERAPY PER DAY

## 2025-01-28 PROCEDURE — 3700000000 HC ANESTHESIA ATTENDED CARE: Performed by: ORTHOPAEDIC SURGERY

## 2025-01-28 PROCEDURE — 6370000000 HC RX 637 (ALT 250 FOR IP): Performed by: ORTHOPAEDIC SURGERY

## 2025-01-28 PROCEDURE — 7100000000 HC PACU RECOVERY - FIRST 15 MIN: Performed by: ORTHOPAEDIC SURGERY

## 2025-01-28 PROCEDURE — 2580000003 HC RX 258: Performed by: NURSE ANESTHETIST, CERTIFIED REGISTERED

## 2025-01-28 PROCEDURE — 7100000001 HC PACU RECOVERY - ADDTL 15 MIN: Performed by: ORTHOPAEDIC SURGERY

## 2025-01-28 PROCEDURE — 2500000003 HC RX 250 WO HCPCS: Performed by: NURSE ANESTHETIST, CERTIFIED REGISTERED

## 2025-01-28 PROCEDURE — 94761 N-INVAS EAR/PLS OXIMETRY MLT: CPT

## 2025-01-28 PROCEDURE — 94664 DEMO&/EVAL PT USE INHALER: CPT

## 2025-01-28 DEVICE — FEMORAL HEAD Ø 36 SIZE M
Type: IMPLANTABLE DEVICE | Site: HIP | Status: FUNCTIONAL
Brand: COCR FEMORAL BALL HEAD

## 2025-01-28 DEVICE — IMPLANTABLE DEVICE: Type: IMPLANTABLE DEVICE | Site: HIP | Status: FUNCTIONAL

## 2025-01-28 DEVICE — MASTERLOC CEMENTLESS TI COATED LAT STEM # 8
Type: IMPLANTABLE DEVICE | Site: HIP | Status: FUNCTIONAL
Brand: MASTERLOC FEMORAL STEMS

## 2025-01-28 DEVICE — FLAT PE  HC LINER Ø 36 / F
Type: IMPLANTABLE DEVICE | Site: HIP | Status: FUNCTIONAL
Brand: MPACT ACETABULAR SYSTEM

## 2025-01-28 RX ORDER — SODIUM CHLORIDE 9 MG/ML
INJECTION, SOLUTION INTRAVENOUS PRN
Status: DISCONTINUED | OUTPATIENT
Start: 2025-01-28 | End: 2025-01-29 | Stop reason: HOSPADM

## 2025-01-28 RX ORDER — OXYCODONE HYDROCHLORIDE 5 MG/1
5 TABLET ORAL EVERY 4 HOURS PRN
Status: DISCONTINUED | OUTPATIENT
Start: 2025-01-28 | End: 2025-01-29 | Stop reason: HOSPADM

## 2025-01-28 RX ORDER — BENZONATATE 100 MG/1
200 CAPSULE ORAL 3 TIMES DAILY PRN
Status: DISCONTINUED | OUTPATIENT
Start: 2025-01-28 | End: 2025-01-29 | Stop reason: HOSPADM

## 2025-01-28 RX ORDER — ALBUTEROL SULFATE 90 UG/1
2 INHALANT RESPIRATORY (INHALATION) EVERY 6 HOURS PRN
Status: DISCONTINUED | OUTPATIENT
Start: 2025-01-28 | End: 2025-01-29 | Stop reason: HOSPADM

## 2025-01-28 RX ORDER — ASPIRIN 81 MG/1
81 TABLET ORAL DAILY
Status: DISCONTINUED | OUTPATIENT
Start: 2025-01-29 | End: 2025-01-29 | Stop reason: HOSPADM

## 2025-01-28 RX ORDER — SODIUM CHLORIDE 9 MG/ML
INJECTION, SOLUTION INTRAVENOUS CONTINUOUS
Status: DISCONTINUED | OUTPATIENT
Start: 2025-01-28 | End: 2025-01-28 | Stop reason: HOSPADM

## 2025-01-28 RX ORDER — DILTIAZEM HYDROCHLORIDE 120 MG/1
120 CAPSULE, COATED, EXTENDED RELEASE ORAL DAILY
Status: DISCONTINUED | OUTPATIENT
Start: 2025-01-28 | End: 2025-01-29 | Stop reason: HOSPADM

## 2025-01-28 RX ORDER — FENTANYL CITRATE 50 UG/ML
INJECTION, SOLUTION INTRAMUSCULAR; INTRAVENOUS
Status: DISCONTINUED | OUTPATIENT
Start: 2025-01-28 | End: 2025-01-28 | Stop reason: SDUPTHER

## 2025-01-28 RX ORDER — M-VIT,TX,IRON,MINS/CALC/FOLIC 27MG-0.4MG
1 TABLET ORAL DAILY
Status: DISCONTINUED | OUTPATIENT
Start: 2025-01-29 | End: 2025-01-29 | Stop reason: HOSPADM

## 2025-01-28 RX ORDER — BUPIVACAINE/KETOROLAC/KETAMINE 150-60/50
SYRINGE (ML) INJECTION PRN
Status: DISCONTINUED | OUTPATIENT
Start: 2025-01-28 | End: 2025-01-28 | Stop reason: ALTCHOICE

## 2025-01-28 RX ORDER — ONDANSETRON 2 MG/ML
4 INJECTION INTRAMUSCULAR; INTRAVENOUS EVERY 6 HOURS PRN
Status: DISCONTINUED | OUTPATIENT
Start: 2025-01-28 | End: 2025-01-29 | Stop reason: HOSPADM

## 2025-01-28 RX ORDER — POTASSIUM CHLORIDE 1500 MG/1
20 TABLET, EXTENDED RELEASE ORAL 2 TIMES DAILY WITH MEALS
Status: DISCONTINUED | OUTPATIENT
Start: 2025-01-28 | End: 2025-01-29 | Stop reason: HOSPADM

## 2025-01-28 RX ORDER — LABETALOL HYDROCHLORIDE 5 MG/ML
INJECTION, SOLUTION INTRAVENOUS
Status: DISCONTINUED | OUTPATIENT
Start: 2025-01-28 | End: 2025-01-28 | Stop reason: SDUPTHER

## 2025-01-28 RX ORDER — LIDOCAINE HYDROCHLORIDE 20 MG/ML
INJECTION, SOLUTION EPIDURAL; INFILTRATION; INTRACAUDAL; PERINEURAL
Status: DISCONTINUED | OUTPATIENT
Start: 2025-01-28 | End: 2025-01-28 | Stop reason: SDUPTHER

## 2025-01-28 RX ORDER — LEVOTHYROXINE SODIUM 100 UG/1
200 TABLET ORAL DAILY
Status: DISCONTINUED | OUTPATIENT
Start: 2025-01-29 | End: 2025-01-29 | Stop reason: HOSPADM

## 2025-01-28 RX ORDER — TRAZODONE HYDROCHLORIDE 50 MG/1
200 TABLET, FILM COATED ORAL NIGHTLY PRN
Status: DISCONTINUED | OUTPATIENT
Start: 2025-01-28 | End: 2025-01-29 | Stop reason: HOSPADM

## 2025-01-28 RX ORDER — ONDANSETRON 4 MG/1
4 TABLET, ORALLY DISINTEGRATING ORAL EVERY 8 HOURS PRN
Status: DISCONTINUED | OUTPATIENT
Start: 2025-01-28 | End: 2025-01-29 | Stop reason: HOSPADM

## 2025-01-28 RX ORDER — MORPHINE SULFATE 2 MG/ML
2 INJECTION, SOLUTION INTRAMUSCULAR; INTRAVENOUS
Status: DISCONTINUED | OUTPATIENT
Start: 2025-01-28 | End: 2025-01-29

## 2025-01-28 RX ORDER — DIMENHYDRINATE 50 MG
50 TABLET ORAL
Status: COMPLETED | OUTPATIENT
Start: 2025-01-28 | End: 2025-01-28

## 2025-01-28 RX ORDER — BROMPHENIRAMINE MALEATE, PSEUDOEPHEDRINE HYDROCHLORIDE, AND DEXTROMETHORPHAN HYDROBROMIDE 2; 30; 10 MG/5ML; MG/5ML; MG/5ML
5 SYRUP ORAL 4 TIMES DAILY PRN
Status: DISCONTINUED | OUTPATIENT
Start: 2025-01-28 | End: 2025-01-28

## 2025-01-28 RX ORDER — DEXAMETHASONE SODIUM PHOSPHATE 10 MG/ML
INJECTION, SOLUTION INTRAMUSCULAR; INTRAVENOUS
Status: DISCONTINUED | OUTPATIENT
Start: 2025-01-28 | End: 2025-01-28 | Stop reason: SDUPTHER

## 2025-01-28 RX ORDER — SODIUM CHLORIDE 0.9 % (FLUSH) 0.9 %
5-40 SYRINGE (ML) INJECTION EVERY 12 HOURS SCHEDULED
Status: DISCONTINUED | OUTPATIENT
Start: 2025-01-28 | End: 2025-01-28 | Stop reason: HOSPADM

## 2025-01-28 RX ORDER — GUAIFENESIN 600 MG/1
600 TABLET, EXTENDED RELEASE ORAL 2 TIMES DAILY
Status: DISCONTINUED | OUTPATIENT
Start: 2025-01-28 | End: 2025-01-29 | Stop reason: HOSPADM

## 2025-01-28 RX ORDER — KETOROLAC TROMETHAMINE 30 MG/ML
INJECTION, SOLUTION INTRAMUSCULAR; INTRAVENOUS
Status: DISCONTINUED | OUTPATIENT
Start: 2025-01-28 | End: 2025-01-28 | Stop reason: SDUPTHER

## 2025-01-28 RX ORDER — SODIUM CHLORIDE 9 MG/ML
INJECTION, SOLUTION INTRAVENOUS PRN
Status: DISCONTINUED | OUTPATIENT
Start: 2025-01-28 | End: 2025-01-28 | Stop reason: HOSPADM

## 2025-01-28 RX ORDER — BUPIVACAINE/KETOROLAC/KETAMINE 150-60/50
SYRINGE (ML) INJECTION
Status: DISPENSED
Start: 2025-01-28 | End: 2025-01-28

## 2025-01-28 RX ORDER — SENNA AND DOCUSATE SODIUM 50; 8.6 MG/1; MG/1
1 TABLET, FILM COATED ORAL 2 TIMES DAILY
Status: DISCONTINUED | OUTPATIENT
Start: 2025-01-28 | End: 2025-01-29 | Stop reason: HOSPADM

## 2025-01-28 RX ORDER — BUMETANIDE 1 MG/1
2 TABLET ORAL DAILY
Status: DISCONTINUED | OUTPATIENT
Start: 2025-01-28 | End: 2025-01-29 | Stop reason: HOSPADM

## 2025-01-28 RX ORDER — ONDANSETRON 2 MG/ML
INJECTION INTRAMUSCULAR; INTRAVENOUS
Status: DISCONTINUED | OUTPATIENT
Start: 2025-01-28 | End: 2025-01-28 | Stop reason: SDUPTHER

## 2025-01-28 RX ORDER — DIGOXIN 125 MCG
125 TABLET ORAL DAILY
Status: DISCONTINUED | OUTPATIENT
Start: 2025-01-28 | End: 2025-01-29 | Stop reason: HOSPADM

## 2025-01-28 RX ORDER — ACETAMINOPHEN 325 MG/1
650 TABLET ORAL ONCE
Status: DISCONTINUED | OUTPATIENT
Start: 2025-01-28 | End: 2025-01-28 | Stop reason: HOSPADM

## 2025-01-28 RX ORDER — ROPIVACAINE HYDROCHLORIDE 5 MG/ML
INJECTION, SOLUTION EPIDURAL; INFILTRATION; PERINEURAL
Status: DISCONTINUED | OUTPATIENT
Start: 2025-01-28 | End: 2025-01-28

## 2025-01-28 RX ORDER — MORPHINE SULFATE 4 MG/ML
4 INJECTION, SOLUTION INTRAMUSCULAR; INTRAVENOUS
Status: DISCONTINUED | OUTPATIENT
Start: 2025-01-28 | End: 2025-01-29

## 2025-01-28 RX ORDER — ATORVASTATIN CALCIUM 40 MG/1
40 TABLET, FILM COATED ORAL NIGHTLY
Status: DISCONTINUED | OUTPATIENT
Start: 2025-01-28 | End: 2025-01-29 | Stop reason: HOSPADM

## 2025-01-28 RX ORDER — SODIUM CHLORIDE 9 MG/ML
INJECTION, SOLUTION INTRAMUSCULAR; INTRAVENOUS; SUBCUTANEOUS
Status: DISCONTINUED | OUTPATIENT
Start: 2025-01-28 | End: 2025-01-28 | Stop reason: SDUPTHER

## 2025-01-28 RX ORDER — SODIUM CHLORIDE 0.9 % (FLUSH) 0.9 %
5-40 SYRINGE (ML) INJECTION PRN
Status: DISCONTINUED | OUTPATIENT
Start: 2025-01-28 | End: 2025-01-29 | Stop reason: HOSPADM

## 2025-01-28 RX ORDER — HYDROCODONE BITARTRATE AND ACETAMINOPHEN 5; 325 MG/1; MG/1
1 TABLET ORAL EVERY 6 HOURS PRN
Status: DISCONTINUED | OUTPATIENT
Start: 2025-01-28 | End: 2025-01-29 | Stop reason: HOSPADM

## 2025-01-28 RX ORDER — ACETAMINOPHEN 325 MG/1
650 TABLET ORAL EVERY 6 HOURS
Status: DISCONTINUED | OUTPATIENT
Start: 2025-01-28 | End: 2025-01-29 | Stop reason: HOSPADM

## 2025-01-28 RX ORDER — ROPIVACAINE HYDROCHLORIDE 5 MG/ML
INJECTION, SOLUTION EPIDURAL; INFILTRATION; PERINEURAL
Status: DISCONTINUED | OUTPATIENT
Start: 2025-01-28 | End: 2025-01-28 | Stop reason: SDUPTHER

## 2025-01-28 RX ORDER — SODIUM CHLORIDE 9 MG/ML
INJECTION, SOLUTION INTRAVENOUS CONTINUOUS
Status: DISCONTINUED | OUTPATIENT
Start: 2025-01-28 | End: 2025-01-29

## 2025-01-28 RX ORDER — DEXMEDETOMIDINE HYDROCHLORIDE 4 UG/ML
INJECTION, SOLUTION INTRAVENOUS
Status: DISCONTINUED | OUTPATIENT
Start: 2025-01-28 | End: 2025-01-28 | Stop reason: SDUPTHER

## 2025-01-28 RX ORDER — ACETAMINOPHEN 325 MG/1
650 TABLET ORAL ONCE
Status: COMPLETED | OUTPATIENT
Start: 2025-01-28 | End: 2025-01-28

## 2025-01-28 RX ORDER — DULOXETIN HYDROCHLORIDE 60 MG/1
60 CAPSULE, DELAYED RELEASE ORAL DAILY
Status: DISCONTINUED | OUTPATIENT
Start: 2025-01-28 | End: 2025-01-29 | Stop reason: HOSPADM

## 2025-01-28 RX ORDER — SODIUM CHLORIDE, SODIUM LACTATE, POTASSIUM CHLORIDE, CALCIUM CHLORIDE 600; 310; 30; 20 MG/100ML; MG/100ML; MG/100ML; MG/100ML
INJECTION, SOLUTION INTRAVENOUS CONTINUOUS
Status: DISCONTINUED | OUTPATIENT
Start: 2025-01-28 | End: 2025-01-29

## 2025-01-28 RX ORDER — IPRATROPIUM BROMIDE AND ALBUTEROL SULFATE 2.5; .5 MG/3ML; MG/3ML
1 SOLUTION RESPIRATORY (INHALATION) EVERY 4 HOURS PRN
Status: DISCONTINUED | OUTPATIENT
Start: 2025-01-28 | End: 2025-01-28

## 2025-01-28 RX ORDER — PROPOFOL 10 MG/ML
INJECTION, EMULSION INTRAVENOUS
Status: DISCONTINUED | OUTPATIENT
Start: 2025-01-28 | End: 2025-01-28 | Stop reason: SDUPTHER

## 2025-01-28 RX ORDER — PREGABALIN 75 MG/1
300 CAPSULE ORAL 2 TIMES DAILY
Status: DISCONTINUED | OUTPATIENT
Start: 2025-01-28 | End: 2025-01-29 | Stop reason: HOSPADM

## 2025-01-28 RX ORDER — FENTANYL CITRATE 50 UG/ML
50 INJECTION, SOLUTION INTRAMUSCULAR; INTRAVENOUS EVERY 5 MIN PRN
Status: DISCONTINUED | OUTPATIENT
Start: 2025-01-28 | End: 2025-01-28 | Stop reason: HOSPADM

## 2025-01-28 RX ORDER — CITALOPRAM HYDROBROMIDE 20 MG/1
40 TABLET ORAL
Status: DISCONTINUED | OUTPATIENT
Start: 2025-01-28 | End: 2025-01-29 | Stop reason: HOSPADM

## 2025-01-28 RX ORDER — DEXAMETHASONE SODIUM PHOSPHATE 4 MG/ML
INJECTION, SOLUTION INTRA-ARTICULAR; INTRALESIONAL; INTRAMUSCULAR; INTRAVENOUS; SOFT TISSUE
Status: DISCONTINUED | OUTPATIENT
Start: 2025-01-28 | End: 2025-01-28 | Stop reason: SDUPTHER

## 2025-01-28 RX ORDER — IPRATROPIUM BROMIDE AND ALBUTEROL SULFATE 2.5; .5 MG/3ML; MG/3ML
1 SOLUTION RESPIRATORY (INHALATION) 3 TIMES DAILY
Status: DISCONTINUED | OUTPATIENT
Start: 2025-01-28 | End: 2025-01-29 | Stop reason: HOSPADM

## 2025-01-28 RX ORDER — SODIUM CHLORIDE 0.9 % (FLUSH) 0.9 %
5-40 SYRINGE (ML) INJECTION PRN
Status: DISCONTINUED | OUTPATIENT
Start: 2025-01-28 | End: 2025-01-28 | Stop reason: HOSPADM

## 2025-01-28 RX ORDER — VANCOMYCIN 1 G/200ML
15 INJECTION, SOLUTION INTRAVENOUS ONCE
Status: COMPLETED | OUTPATIENT
Start: 2025-01-28 | End: 2025-01-28

## 2025-01-28 RX ORDER — SODIUM CHLORIDE 0.9 % (FLUSH) 0.9 %
5-40 SYRINGE (ML) INJECTION EVERY 12 HOURS SCHEDULED
Status: DISCONTINUED | OUTPATIENT
Start: 2025-01-28 | End: 2025-01-29 | Stop reason: HOSPADM

## 2025-01-28 RX ORDER — ASCORBIC ACID 500 MG
500 TABLET ORAL 2 TIMES DAILY
Status: DISCONTINUED | OUTPATIENT
Start: 2025-01-29 | End: 2025-01-29 | Stop reason: HOSPADM

## 2025-01-28 RX ADMIN — PROPOFOL 120 MG: 10 INJECTION, EMULSION INTRAVENOUS at 08:22

## 2025-01-28 RX ADMIN — LABETALOL HYDROCHLORIDE 10 MG: 5 INJECTION INTRAVENOUS at 09:07

## 2025-01-28 RX ADMIN — ACETAMINOPHEN 650 MG: 325 TABLET ORAL at 07:31

## 2025-01-28 RX ADMIN — BUMETANIDE 2 MG: 1 TABLET ORAL at 14:16

## 2025-01-28 RX ADMIN — VANCOMYCIN 1000 MG: 1 INJECTION, SOLUTION INTRAVENOUS at 08:35

## 2025-01-28 RX ADMIN — DEXMEDETOMIDINE HYDROCHLORIDE 19.04 MCG: 4 INJECTION, SOLUTION INTRAVENOUS at 09:09

## 2025-01-28 RX ADMIN — FENTANYL CITRATE 100 MCG: 50 INJECTION INTRAMUSCULAR; INTRAVENOUS at 09:33

## 2025-01-28 RX ADMIN — FENTANYL CITRATE 25 MCG: 50 INJECTION INTRAMUSCULAR; INTRAVENOUS at 07:27

## 2025-01-28 RX ADMIN — FENTANYL CITRATE 25 MCG: 50 INJECTION INTRAMUSCULAR; INTRAVENOUS at 07:35

## 2025-01-28 RX ADMIN — FENTANYL CITRATE 100 MCG: 50 INJECTION INTRAMUSCULAR; INTRAVENOUS at 09:00

## 2025-01-28 RX ADMIN — PREGABALIN 300 MG: 75 CAPSULE ORAL at 20:22

## 2025-01-28 RX ADMIN — PHENYLEPHRINE HYDROCHLORIDE 50 MCG: 10 INJECTION INTRAVENOUS at 08:37

## 2025-01-28 RX ADMIN — DULOXETINE HYDROCHLORIDE 60 MG: 60 CAPSULE, DELAYED RELEASE ORAL at 14:16

## 2025-01-28 RX ADMIN — FENTANYL CITRATE 50 MCG: 50 INJECTION INTRAMUSCULAR; INTRAVENOUS at 08:04

## 2025-01-28 RX ADMIN — IPRATROPIUM BROMIDE AND ALBUTEROL SULFATE 1 DOSE: .5; 2.5 SOLUTION RESPIRATORY (INHALATION) at 16:18

## 2025-01-28 RX ADMIN — PREGABALIN 300 MG: 75 CAPSULE ORAL at 14:16

## 2025-01-28 RX ADMIN — FENTANYL CITRATE 100 MCG: 50 INJECTION INTRAMUSCULAR; INTRAVENOUS at 08:52

## 2025-01-28 RX ADMIN — IPRATROPIUM BROMIDE AND ALBUTEROL SULFATE 1 DOSE: .5; 2.5 SOLUTION RESPIRATORY (INHALATION) at 21:05

## 2025-01-28 RX ADMIN — ACETAMINOPHEN 650 MG: 325 TABLET ORAL at 14:18

## 2025-01-28 RX ADMIN — DEXAMETHASONE SODIUM PHOSPHATE 10 MG: 10 INJECTION, SOLUTION INTRAMUSCULAR; INTRAVENOUS at 07:45

## 2025-01-28 RX ADMIN — KETOROLAC TROMETHAMINE 30 MG: 30 INJECTION, SOLUTION INTRAMUSCULAR at 11:02

## 2025-01-28 RX ADMIN — DOCUSATE SODIUM 50 MG AND SENNOSIDES 8.6 MG 1 TABLET: 8.6; 5 TABLET, FILM COATED ORAL at 20:22

## 2025-01-28 RX ADMIN — WATER 1000 MG: 1 INJECTION INTRAMUSCULAR; INTRAVENOUS; SUBCUTANEOUS at 16:58

## 2025-01-28 RX ADMIN — DEXAMETHASONE SODIUM PHOSPHATE 4 MG: 4 INJECTION, SOLUTION INTRAMUSCULAR; INTRAVENOUS at 11:02

## 2025-01-28 RX ADMIN — GUAIFENESIN 600 MG: 600 TABLET, EXTENDED RELEASE ORAL at 20:22

## 2025-01-28 RX ADMIN — PHENYLEPHRINE HYDROCHLORIDE 50 MCG: 10 INJECTION INTRAVENOUS at 10:33

## 2025-01-28 RX ADMIN — PHENYLEPHRINE HYDROCHLORIDE 50 MCG: 10 INJECTION INTRAVENOUS at 09:55

## 2025-01-28 RX ADMIN — PHENYLEPHRINE HYDROCHLORIDE 100 MCG: 10 INJECTION INTRAVENOUS at 10:19

## 2025-01-28 RX ADMIN — SODIUM CHLORIDE 20 ML: 9 INJECTION, SOLUTION INTRAMUSCULAR; INTRAVENOUS; SUBCUTANEOUS at 07:45

## 2025-01-28 RX ADMIN — FENTANYL CITRATE 50 MCG: 50 INJECTION INTRAMUSCULAR; INTRAVENOUS at 07:23

## 2025-01-28 RX ADMIN — LABETALOL HYDROCHLORIDE 10 MG: 5 INJECTION INTRAVENOUS at 09:22

## 2025-01-28 RX ADMIN — FENTANYL CITRATE 50 MCG: 50 INJECTION INTRAMUSCULAR; INTRAVENOUS at 08:00

## 2025-01-28 RX ADMIN — LIDOCAINE HYDROCHLORIDE 100 MG: 20 INJECTION, SOLUTION EPIDURAL; INFILTRATION; INTRACAUDAL; PERINEURAL at 08:22

## 2025-01-28 RX ADMIN — DIMENHYDRINATE 50 MG: 50 TABLET ORAL at 07:31

## 2025-01-28 RX ADMIN — POTASSIUM CHLORIDE 20 MEQ: 1500 TABLET, EXTENDED RELEASE ORAL at 16:58

## 2025-01-28 RX ADMIN — CITALOPRAM HYDROBROMIDE 40 MG: 20 TABLET ORAL at 20:22

## 2025-01-28 RX ADMIN — DILTIAZEM HYDROCHLORIDE 120 MG: 120 CAPSULE, COATED, EXTENDED RELEASE ORAL at 14:13

## 2025-01-28 RX ADMIN — ACETAMINOPHEN 650 MG: 325 TABLET ORAL at 20:22

## 2025-01-28 RX ADMIN — DEXMEDETOMIDINE HYDROCHLORIDE 19.04 MCG: 4 INJECTION, SOLUTION INTRAVENOUS at 10:08

## 2025-01-28 RX ADMIN — SODIUM CHLORIDE: 9 INJECTION, SOLUTION INTRAVENOUS at 12:36

## 2025-01-28 RX ADMIN — PHENYLEPHRINE HYDROCHLORIDE 50 MCG: 10 INJECTION INTRAVENOUS at 10:25

## 2025-01-28 RX ADMIN — VANCOMYCIN HYDROCHLORIDE 1000 MG: 1 INJECTION, POWDER, LYOPHILIZED, FOR SOLUTION INTRAVENOUS at 20:26

## 2025-01-28 RX ADMIN — OXYCODONE 5 MG: 5 TABLET ORAL at 21:25

## 2025-01-28 RX ADMIN — FENTANYL CITRATE 100 MCG: 50 INJECTION INTRAMUSCULAR; INTRAVENOUS at 09:05

## 2025-01-28 RX ADMIN — ROPIVACAINE HYDROCHLORIDE 30 ML: 5 INJECTION EPIDURAL; INFILTRATION; PERINEURAL at 07:45

## 2025-01-28 RX ADMIN — ONDANSETRON 4 MG: 2 INJECTION, SOLUTION INTRAMUSCULAR; INTRAVENOUS at 11:02

## 2025-01-28 RX ADMIN — PHENYLEPHRINE HYDROCHLORIDE 50 MCG: 10 INJECTION INTRAVENOUS at 10:13

## 2025-01-28 RX ADMIN — CEFAZOLIN 1000 MG: 1 INJECTION, POWDER, FOR SOLUTION INTRAMUSCULAR; INTRAVENOUS; PARENTERAL at 08:43

## 2025-01-28 RX ADMIN — DIGOXIN 125 MCG: 125 TABLET ORAL at 14:16

## 2025-01-28 RX ADMIN — SUGAMMADEX 200 MG: 100 INJECTION, SOLUTION INTRAVENOUS at 10:23

## 2025-01-28 RX ADMIN — TRAZODONE HYDROCHLORIDE 200 MG: 50 TABLET ORAL at 23:39

## 2025-01-28 RX ADMIN — GUAIFENESIN 600 MG: 600 TABLET, EXTENDED RELEASE ORAL at 14:18

## 2025-01-28 RX ADMIN — ATORVASTATIN CALCIUM 40 MG: 40 TABLET, FILM COATED ORAL at 20:22

## 2025-01-28 RX ADMIN — SODIUM CHLORIDE, POTASSIUM CHLORIDE, SODIUM LACTATE AND CALCIUM CHLORIDE: 600; 310; 30; 20 INJECTION, SOLUTION INTRAVENOUS at 07:43

## 2025-01-28 ASSESSMENT — PAIN DESCRIPTION - ONSET
ONSET: ON-GOING

## 2025-01-28 ASSESSMENT — PAIN DESCRIPTION - LOCATION
LOCATION: HIP;LEG
LOCATION: HIP
LOCATION: HIP;LEG

## 2025-01-28 ASSESSMENT — PAIN SCALES - GENERAL
PAINLEVEL_OUTOF10: 8
PAINLEVEL_OUTOF10: 6
PAINLEVEL_OUTOF10: 0
PAINLEVEL_OUTOF10: 10
PAINLEVEL_OUTOF10: 7
PAINLEVEL_OUTOF10: 0
PAINLEVEL_OUTOF10: 6

## 2025-01-28 ASSESSMENT — COPD QUESTIONNAIRES: CAT_SEVERITY: SEVERE

## 2025-01-28 ASSESSMENT — PAIN - FUNCTIONAL ASSESSMENT
PAIN_FUNCTIONAL_ASSESSMENT: FACE, LEGS, ACTIVITY, CRY, AND CONSOLABILITY (FLACC)
PAIN_FUNCTIONAL_ASSESSMENT: PREVENTS OR INTERFERES SOME ACTIVE ACTIVITIES AND ADLS
PAIN_FUNCTIONAL_ASSESSMENT: FACE, LEGS, ACTIVITY, CRY, AND CONSOLABILITY (FLACC)
PAIN_FUNCTIONAL_ASSESSMENT: PREVENTS OR INTERFERES SOME ACTIVE ACTIVITIES AND ADLS
PAIN_FUNCTIONAL_ASSESSMENT: FACE, LEGS, ACTIVITY, CRY, AND CONSOLABILITY (FLACC)
PAIN_FUNCTIONAL_ASSESSMENT: PREVENTS OR INTERFERES SOME ACTIVE ACTIVITIES AND ADLS
PAIN_FUNCTIONAL_ASSESSMENT: FACE, LEGS, ACTIVITY, CRY, AND CONSOLABILITY (FLACC)
PAIN_FUNCTIONAL_ASSESSMENT: PREVENTS OR INTERFERES SOME ACTIVE ACTIVITIES AND ADLS
PAIN_FUNCTIONAL_ASSESSMENT: FACE, LEGS, ACTIVITY, CRY, AND CONSOLABILITY (FLACC)

## 2025-01-28 ASSESSMENT — PAIN DESCRIPTION - ORIENTATION
ORIENTATION: LEFT

## 2025-01-28 ASSESSMENT — PAIN DESCRIPTION - DESCRIPTORS
DESCRIPTORS: ACHING

## 2025-01-28 ASSESSMENT — PAIN DESCRIPTION - FREQUENCY
FREQUENCY: CONTINUOUS

## 2025-01-28 ASSESSMENT — LIFESTYLE VARIABLES: SMOKING_STATUS: 0

## 2025-01-28 ASSESSMENT — PAIN DESCRIPTION - PAIN TYPE
TYPE: CHRONIC PAIN
TYPE: SURGICAL PAIN

## 2025-01-28 NOTE — RT PROTOCOL NOTE
RESPIRATORY ASSESSMENT PROTOCOL                                                                                              Patient Name: Toshia South Room#: I304/I304-01 : 1945     Admitting diagnosis: Primary localized osteoarthritis of left hip [M16.12]  Primary osteoarthritis of left hip [M16.12]       Medical History:   Past Medical History:   Diagnosis Date    A-fib ([facility])     Anxiety     Atrial fibrillation (HCC)     Biventricular congestive heart failure (HCC)     Bronchiectasis with acute exacerbation ([facility]) 2022    CAD (coronary artery disease)     Chronic pain syndrome     dilaudid infusion pump    COPD (chronic obstructive pulmonary disease) (HCC)     Depression     GERD (gastroesophageal reflux disease)     Hypothyroidism     Impaired fasting glucose     Iron deficiency anemia     Osteoporosis     Pulmonary fibrosis ([facility]) 2022    Sleep apnea     Subdural hematoma 2022       PATIENT ASSESSMENT    LABORATORY DATA  Hematology:   Lab Results   Component Value Date/Time    WBC 6.9 2025 10:02 AM    RBC 3.81 2025 10:02 AM    HGB 11.7 2025 10:02 AM    HCT 36.2 2025 10:02 AM     2025 10:02 AM     Chemistry:    Lab Results   Component Value Date/Time    PHART 7.346 12/10/2024 01:36 PM    BOH2JMK 59.8 12/10/2024 01:36 PM    PO2ART 97.2 12/10/2024 01:36 PM    Z9HPGNAH 96.9 12/10/2024 01:36 PM    TMF4PZF 32.0 12/10/2024 01:36 PM    PBEA 6.0 2024 05:06 AM    NBEA 1.2 2024 06:22 PM       VITALS  Pulse: 69   Respirations: 10  BP: (!) 96/46  SpO2: 99 % O2 Device: Nasal cannula 4 lpm  Temp: 98 °F (36.7 °C)    SKIN COLOR  [x] Normal  [] Pale  [] Dusky  [] Cyanotic    RESPIRATORY PATTERN  [x] Normal  [] Dyspnea  [] Cheyne-Granger  [] Kussmaul  [] Biots    AMBULATORY  [x] Yes  [] No  [x] With Assistance    PEAK FLOW  Predicted:     Personal Best:            Patient Acuity 0 1 2 3 4 Score   Level of Consciousness (LOC) [x]  Alert & Oriented or Pt  in 24 hrs Total Acuity:  0-3  []  Secondary Assessment in 48 hrs   HHN AEROSOL THERAPY with  [physician-ordered bronchodilator(s)] q 4 & Albuterol PRN q2 hrs.   Breath-Actuated Neb if BBS Acuity = 4, and pt. can use MP. Notify physician if condition deteriorates.  HHN AEROSOL THERAPY with  [physician-ordered bronchodilator(s)]  QID and Albuterol PRN q4 hrs.   Breath-Actuated Neb if BBS Acuity = 4, and pt. can use MP.  Notify physician if condition deteriorates. MDI THERAPY with  2 actuations of [physician-ordered bronchodilator(s)] via spacer TID Albuterol and PRN q4 hrs.   If unable to utilize MDI: HHN [physician-ordered bronchodilator(s)] TID and Albuterol PRN q4 hrs.   Notify physician if condition deteriorates. MDI THERAPY with  [physician-ordered bronchodilator(s)] via spacer TID PRN.   If unable to utilize MDI: HHN [physician-ordered bronchodilator(s)] TID PRN.   Notify physician if condition deteriorates.         If Acuity Level is 2, 3, or 4 in any of the following:    [] COUGH     [] SURGICAL HISTORY (SURG HX)  [] CHEST XRAY (CXR)    Goal: Improvement in sputum mobilization in patients with ineffective airway clearance. Reverse atelectasis.    [] Bronchopulmonary Hygiene Protocol      Total Acuity:   14-28  []  Secondary Assessment in 24 hrs Total Acuity:  9-13  []  Secondary Assessment in 24 hrs Total Acuity:  4-8  []  Secondary Assessment in 24 hrs Total Acuity:  0-3  []  Secondary Assessment in 48 hrs   METANEB QID with [physician-ordered bronchodilator(s)] if CXR Acuity = 4; otherwise:  PD&P, Oscillatory Therapy, or Vest QID & PRN AND PEP QID & PRN  NT Sxn PRN for ineffective cough  METANEB QID with [physician-ordered bronchodilator(s)] if CXR Acuity = 4; otherwise:  PD&P, Oscillatory Therapy or Vest QID & PRN AND PEP QID & PRN  NT Sxn PRN for ineffective cough  PD&P, Oscillatory Therapy, or Vest TID & PRN AND PEP TID & PRN   Instruct patient to self-perform IS q1hr WA       If Acuity Level is 2 or

## 2025-01-28 NOTE — OP NOTE
Operative Note      Patient: Toshia South  YOB: 1945  MRN: [id number]    DATE OF VISIT: 1/28/2025    PREOPERATIVE DIAGNOSIS:  Left hip primary osteoarthritis    POSTOPERATIVE DIAGNOSIS:  Same    PROCEDURE:   Left Total Hip Arthroplasty - Anterior Approach    SURGEON: Josemanuel Larsen MD     ASSISTANT: Massiel Leyva    ANESTHETIST: CRNA: Cheikh Chavarria APRN - CRNA    ANESTHESIA: general with regional nerve block and JOAQUIM     COMPONENTS:   Implant Name Type Inv. Item Serial No.  Lot No. LRB No. Used Action   CUP ACET TWO HOLE 56 MM HIP MPACT 3D MTL - [id number]  CUP ACET TWO HOLE 56 MM HIP MPACT 3D MTL  [employer] [place]- [id number] Left 1 Implanted   LINER ACET SZ F ID36MM FLAT UHMWPE HIGHCROSS MPACT - [id number]  LINER ACET SZ F ID36MM FLAT UHMWPE HIGHCROSS MPACT  [employer]-WD [id number] Left 1 Implanted   STEM FEM SZ 8 LAT HIP MECTAGRIP CEMENTLESS FLAT DBL TAPR - [id number]  STEM FEM SZ 8 LAT HIP MECTAGRIP CEMENTLESS FLAT DBL TAPR  [employer] [place]il[place]uo [id number] Left 1 Implanted   HEAD FEM M [id number] CO CHROM AMISTEM - [id number]  HEAD FEM M [id number] CO CHROM AMISTEM  [employer] [place]-WD [id number] Left 1 Implanted       ESTIMATED BLOOD LOSS: 100 mL     SPECIMEN: None.     INDICATIONS: Patient presented with chronic hip pain secondary to arthritis.  The patient failed to obtain meaningful relief despite nonoperative management.  Given the patient's persistent pain and disability, and difficulties with activities of daily living, wished to proceed with hip replacement surgery. Reviewed risks, complications, alternatives and benefits. The patient's questions were answered, and informed consent was obtained.    DESCRIPTION OF PROCEDURE: The patient was identified in the pre-operative holding area.  With the patient's agreement, the operative site was marked. Patient was taken to the operating room.  Anesthesia was administered along with preoperative antibiotics. The patient was carefully padded and positioned in

## 2025-01-28 NOTE — PROGRESS NOTES
Patient up to the chair with +1 with walker. Tolerated very well. Stated no pain, its the first time in a year she hasn't had pain.

## 2025-01-28 NOTE — ANESTHESIA POSTPROCEDURE EVALUATION
Department of Anesthesiology  Postprocedure Note    Patient: Toshia South  MRN: [id number]  YOB: 1945  Date of evaluation: 1/28/2025    Procedure Summary       Date: 01/28/25 Room / Location: [address]    Anesthesia Start: 0816 Anesthesia Stop: 1100    Procedure: HIP TOTAL ARTHROPLASTY ANTERIOR APPROACH (Left: Hip) Diagnosis:       Primary localized osteoarthritis of left hip      (Primary localized osteoarthritis of left hip [M16.12])    Surgeons: Josemanuel Larsen MD Responsible Provider: Cheikh Chavarria APRN - CRNA    Anesthesia Type: general ASA Status: 4            Anesthesia Type: No value filed.    Latoya Phase I: Latoya Score: 8    Latoya Phase II:      Anesthesia Post Evaluation    Patient location during evaluation: PACU  Patient participation: complete - patient cannot participate  Level of consciousness: sleepy but conscious  Airway patency: patent  Cardiovascular status: hemodynamically stable  Respiratory status: acceptable and nasal cannula  Hydration status: euvolemic  Comments: Transferred to ICU for postop   Multimodal analgesia pain management approach  Pain management: satisfactory to patient    No notable events documented.

## 2025-01-28 NOTE — PROGRESS NOTES
Transported pt by bed to  on monitor and 4L NC (which is pt's baseline). Report given to Roselia RN and MARVIN Harris.    Discharge Criteria    Inpatients must meet Criteria 1 through 7. All other patients are either YES or N/A. If a NO is chosen then Anesthesia or Surgeon must be notified.      1.  Minimum 30 minutes after last dose of sedative medication.    Yes      2.  Systolic BP between 90 - 160. Diastolic BP between 60 - 90.    Yes, anesthesia aware of recent BPs and ok with transferring to ICU.      3.  Pulse between 60 - 120    Yes      4.  Respirations between 8 - 25.    Yes      5.  SpO2 92% - 100%.    Yes, on 4L NC      6.  Able to cough and swallow or return to baseline function.    Yes      7.  Alert and oriented or return to baseline mental status.    No, pt is more drowsy than baseline but ok to transfer to ICU per FRANCY Cleaning.

## 2025-01-28 NOTE — CONSULTS
Hospitalist Consult Note      Requesting Physician:  Dr. Larsen     Reason for consultation: Medical Management     SUBJECTIVE:    History of Present Illness:   The patient is a 79 y.o. female who underwent an elective left total Hip replacement by Dr. Larsen for degenerative arthritis and pain which was uncontrolled with outpatient conservative management.  Post-op course thus far has been uncomplicated.   Her pain is well controlled post operatively.  She denies nausea and vomiting post op.  She denies any chest pain, palpitations or shortness of breath.    Past Medical History:        Diagnosis Date    A-fib (HCC)     Anxiety     Atrial fibrillation (HCC)     Biventricular congestive heart failure (HCC)     Bronchiectasis with acute exacerbation (HCC) 04/29/2022    CAD (coronary artery disease)     Chronic pain syndrome     dilaudid infusion pump    COPD (chronic obstructive pulmonary disease) (HCC)     Depression     GERD (gastroesophageal reflux disease)     Hypothyroidism     Impaired fasting glucose     Iron deficiency anemia     Osteoporosis     Pulmonary fibrosis (HCC) 04/29/2022    Sleep apnea     Subdural hematoma 08/23/2022       Past Surgical History:        Procedure Laterality Date    BACK SURGERY  09/09/1998    spinal cord stimulator    BACK SURGERY  08/04/1999    infusion pump insertion    BACK SURGERY  10/23/2003    spinal cord stimulator removed    BACK SURGERY  10/23/2003    new infusion pump placed    BACK SURGERY  09/11/2017    replaced infusion pump    CARPAL TUNNEL RELEASE Right 12/17/1999    CARPAL TUNNEL RELEASE Left 11/24/1999    CARPAL TUNNEL RELEASE Right 12/30/2002    CARPAL TUNNEL RELEASE Left 12/17/2003    CERVICAL DISC SURGERY  10/25/2004    anterior cervical diskectomy C7-T1    CERVICAL DISC SURGERY  12/22/2004    anterior cervical discectomy C6-7 (complicated by damaged nerve to vocal cord)    CRANIOTOMY Left 08/23/2022    LEFT CRANIOTOMY FOR SUBDURAL HEMATOMA EVACUATION performed by  planning  Up with assistance  Ice to hip  [facility]  Imaging: no further imaging studies ordered today  Medications:   Continue Tylenol, Xarelto, Lyrica, Senokot,   Continue Norco  Continue MS  Continue Zanaflex  Medication Monitoring / High Risk Medications: none      COPD/Bronchiectasis/IPF/Chronic Respiratory Failure with Hypoxia    Condition is a chronic stable condition  Treatment plan:   Monitor respiratory status  Supplemental oxygen  IS  Up to chair  Imaging: no further imaging studies ordered today  Medications:   Continue Nebs  Continue Stiolto, Mucinex,     Nutrition status:   at risk for malnutrition  Dietician consult initiated    Hospital Prophylaxis:   DVT: Xarelto   Stress Ulcer:  na        Disposition:  Shared decision making: All test results, treatment options and disposition options were discussed with the patient today  Social determinants of health that may impact management: none  Code status: Full Code   Disposition: Discharge plan is pending    [facility] Medication Reconciliation documentation:    [x] I have utilized all available immediate resources to obtain, update, or review the patient's current medications (including all prescriptions, over-the-counter products, herbals, cannabinoid products and bitamin/mineral/dietary/nutritional supplements.  [If 'yes\", STOP HERE]     []  The patient is not eligible for medication reconciliation; the patient is in an emergent medical situation where delaying treatment would jeopardize the patient's health    []  I did NOT confirm, update or review the patient's current list of medications today.  [DOES NOT SATISFY [facility] PERFORMANCE]      RYAN Harper - CNP, RYAN, NP-C  1/28/2025, 3:36 PM

## 2025-01-28 NOTE — DISCHARGE INSTRUCTIONS
ORTHO SURGERY DISCHARGE INSTRUCTIONS    1.  Do not drive or operate hazardous machinery until cleared by your surgeon.    2.  Do not make important personal or business decisions while taking narcotic pain medications.    3.  Do not drink alcoholic beverages.    4.  Do not use tobacco products.    5.  Eat light foods (Jell-O, soups, etc....) and drink plenty of fluids (water, Sprite, etc...) up to 8 glasses per day, as you can tolerate.    6.  If your bandages become soaked with bright red blood, place another dressing pad over your bandages.  (DO NOT remove original bandage.)  Call your surgeon for further instructions.  A small amount of bright red blood is to be expected.    7.  Limit your activities.  Do not engage in heavy work until your surgeon gives you permission.      8.  Report the following signs or any questions regarding your physical condition to your surgeon immediately:    Excessive swelling of, or around the wound area.    Redness.    Temperature of 100 degrees (F) or above.    Excessive pain.    9.  Call your surgeon [phone] for any questions regarding your surgery.  For urgent concerns after office hours, you may call Dr. Larsen on his cell phone at [phone].    10. Follow-up with your surgeon as directed.      SPECIAL INSTRUCTIONS AND MEDICATIONS    1.  Move toes to improve circulation.    2.  Continue leg exercises as instructed by Physical Therapy.    3.  Use ibuprofen and/or Tylenol for pain.  You may also use prescribed pain pill (which also includes tylenol) as directed by the doctor.  Do not take more than 3,000 mg tylenol in a day.    4.  Keep your dressing on and dry.  You may shower with waterproof Aquacel dressing in place.    5.  Use walker as needed.    6.  Resume Xarelto for DVT prevention.

## 2025-01-28 NOTE — CARE COORDINATION
01/28/25 1240   Service Assessment   Patient Orientation Sedated   Cognition Other (see comment)  (drowsy)   History Provided By Spouse;Medical Record;Child/Family   Primary Caregiver Family   Accompanied By/Relationship Sons Lucio and Jose Eduardo,  Candelario.   Support Systems Spouse/Significant Other;Children;Family Members;Friends/Neighbors   Patient's Healthcare Decision Maker is: Legal Next of Kin   PCP Verified by CM Yes   Last Visit to PCP Within last 3 months   Prior Functional Level Assistance with the following:;Cooking;Housework;Shopping;Mobility   Current Functional Level Assistance with the following:;Cooking;Housework;Shopping;Mobility;Bathing;Dressing;Toileting   Can patient return to prior living arrangement Other (see comment)  (Family would like patient to go for skilled rehab.)   Ability to make needs known: Other (see comment)  (at this moment, too sedated from surgery.)   Family able to assist with home care needs: Other (comment)  (To some degree family can help.)   Would you like for me to discuss the discharge plan with any other family members/significant others, and if so, who? Yes  (Sons and  present.)   Financial Resources Medicare   Community Resources None   Discharge Planning   Type of Residence House   Living Arrangements Spouse/Significant Other   Current Services Prior To Admission C-pap;Durable Medical Equipment;Oxygen Therapy   Current DME Prior to Arrival Cpap;Walker;Oxygen Therapy (Comment);Home Aerosol   Potential Assistance Needed Skilled Nursing Facility   DME Ordered? No   Potential Assistance Purchasing Medications No   Type of Home Care Services None   Patient expects to be discharged to: Skilled nursing facility   Services At/After Discharge   Transition of Care Consult (CM Consult) SNF   Partner SNF Yes   Services At/After Discharge OT;PT;Nursing services    Resource Information Provided? No   Mode of Transport at Discharge SHERICE Coleman   Confirm Follow Up Transport

## 2025-01-28 NOTE — PROGRESS NOTES
Patient awake and responsive. Able to give medications whole in applesauce. Patient resting comfortably in bed.

## 2025-01-28 NOTE — ANESTHESIA PRE PROCEDURE
Department of Anesthesiology  Preprocedure Note       Name:  Toshia South   Age:  79 y.o.  :  1945                                          MRN:  [id number]         Date:  2025      Surgeon: Surgeon(s):  Josemanuel Larsen MD    Procedure: Procedure(s):  HIP TOTAL ARTHROPLASTY ANTERIOR APPROACH    Medications prior to admission:   Prior to Admission medications    Medication Sig Start Date End Date Taking? Authorizing Provider   DULoxetine (CYMBALTA) 60 MG extended release capsule TAKE 1 CAPSULE BY MOUTH DAILY 24  Yes Jong Moraes MD   bumetanide (BUMEX) 2 MG tablet Take 1 tablet by mouth daily 24  Yes Jong Moraes MD   atorvastatin (LIPITOR) 40 MG tablet Take 1 tablet by mouth nightly 24  Yes Juan Salmeron MD   benzonatate (TESSALON) 100 MG capsule Take 2 capsules by mouth 3 times daily as needed for Cough 24  Yes Jong Moraes MD   dilTIAZem (CARDIZEM CD) 120 MG extended release capsule Take 1 capsule by mouth daily 24  Yes Linnette Alvarez APRN - CNP   digoxin (LANOXIN) 125 MCG tablet Take 1 tablet by mouth daily 24  Yes Linnette Alvarez APRN - CNP   potassium chloride (KLOR-CON M) 20 MEQ extended release tablet TAKE 1 TABLET BY MOUTH TWICE  DAILY 24  Yes Jong Moraes MD   levothyroxine (SYNTHROID) 200 MCG tablet TAKE 1 TABLET BY MOUTH DAILY 24  Yes Jong Moraes MD   traZODone (DESYREL) 100 MG tablet TAKE 2 TABLETS BY MOUTH AT NIGHT 24  Yes Jong Moraes MD   umeclidinium-vilanterol (ANORO ELLIPTA) 62.5-25 MCG/ACT inhaler Inhale 1 puff into the lungs daily 24  Yes Boy Dennis MD   VENTOLIN  (90 Base) MCG/ACT inhaler USE 2 INHALATIONS BY MOUTH INTO  THE LUNGS 4 TIMES DAILY AS  NEEDED FOR WHEEZING 24  Yes Jong Moraes MD   citalopram (CELEXA) 20 MG tablet Take 2 tablets by mouth daily 24  Yes Hermila Gabriel APRN - CNP   ipratropium 0.5 mg-albuterol

## 2025-01-28 NOTE — PROGRESS NOTES
Patient arrived to the . Report received at bedside. Patient very drowsy/lethargic. Able to open eyes and able to say her birthday. Patient continues to comfortably sleep.  and sons at bedside.  asked to bring in Bipap.

## 2025-01-28 NOTE — ANESTHESIA PROCEDURE NOTES
Peripheral Block    Patient location during procedure: pre-op  Reason for block: post-op pain management and at surgeon's request  Start time: 1/28/2025 7:40 AM  End time: 1/28/2025 7:45 AM  Staffing  Resident/CRNA: Cheikh Chavarria APRN - CRNA  Performed by: Cheikh Chavarria APRN - CRNA  Authorized by: Cheikh Chavarria APRN - CRNA    Preanesthetic Checklist  Completed: patient identified, IV checked, site marked, risks and benefits discussed, surgical/procedural consents, equipment checked, pre-op evaluation, timeout performed, anesthesia consent given, oxygen available and monitors applied/VS acknowledged  Peripheral Block   Patient position: supine  Prep: ChloraPrep  Provider prep: mask and sterile gloves  Patient monitoring: continuous pulse ox, IV access, oxygen and responsive to questions  Block type: Fascia iliaca  Laterality: left  Injection technique: single-shot  Guidance: ultrasound guided  Local infiltration: ropivacaine and decadron  Local infiltration: ropivacaine and decadron    Needle   Needle type: insulated echogenic nerve stimulator needle   Needle gauge: 22 G  Needle localization: ultrasound guidance  Needle length: 5 cm  Assessment   Injection assessment: negative aspiration for heme, no paresthesia on injection and no intravascular symptoms  Paresthesia pain: none  Slow fractionated injection: yes  Hemodynamics: stable  Outcomes: uncomplicated and patient tolerated procedure well

## 2025-01-28 NOTE — PROGRESS NOTES
Patient straight cathed. Patient sitting on the side of the bed with feet dangling. Does need some support to stay upright.

## 2025-01-29 VITALS
OXYGEN SATURATION: 97 % | SYSTOLIC BLOOD PRESSURE: 113 MMHG | HEIGHT: 65 IN | BODY MASS INDEX: 24.76 KG/M2 | WEIGHT: 148.6 LBS | HEART RATE: 79 BPM | DIASTOLIC BLOOD PRESSURE: 46 MMHG | TEMPERATURE: 97.5 F | RESPIRATION RATE: 14 BRPM

## 2025-01-29 LAB
ANION GAP SERPL CALCULATED.3IONS-SCNC: 7 MMOL/L (ref 9–16)
BUN SERPL-MCNC: 17 MG/DL (ref 8–23)
BUN/CREAT SERPL: 34 (ref 9–20)
CALCIUM SERPL-MCNC: 8.8 MG/DL (ref 8.6–10.4)
CHLORIDE SERPL-SCNC: 103 MMOL/L (ref 98–107)
CO2 SERPL-SCNC: 31 MMOL/L (ref 20–31)
CREAT SERPL-MCNC: 0.5 MG/DL (ref 0.5–0.9)
GFR, ESTIMATED: >90 ML/MIN/1.73M2
GLUCOSE SERPL-MCNC: 121 MG/DL (ref 74–99)
HCT VFR BLD AUTO: 31.1 % (ref 36.3–47.1)
HGB BLD-MCNC: 10.1 G/DL (ref 11.9–15.1)
POTASSIUM SERPL-SCNC: 4.1 MMOL/L (ref 3.7–5.3)
SODIUM SERPL-SCNC: 141 MMOL/L (ref 136–145)

## 2025-01-29 PROCEDURE — 6370000000 HC RX 637 (ALT 250 FOR IP): Performed by: ORTHOPAEDIC SURGERY

## 2025-01-29 PROCEDURE — 6370000000 HC RX 637 (ALT 250 FOR IP): Performed by: NURSE PRACTITIONER

## 2025-01-29 PROCEDURE — 2700000000 HC OXYGEN THERAPY PER DAY

## 2025-01-29 PROCEDURE — 97116 GAIT TRAINING THERAPY: CPT

## 2025-01-29 PROCEDURE — 94664 DEMO&/EVAL PT USE INHALER: CPT

## 2025-01-29 PROCEDURE — 36415 COLL VENOUS BLD VENIPUNCTURE: CPT

## 2025-01-29 PROCEDURE — 85014 HEMATOCRIT: CPT

## 2025-01-29 PROCEDURE — G0378 HOSPITAL OBSERVATION PER HR: HCPCS

## 2025-01-29 PROCEDURE — 94640 AIRWAY INHALATION TREATMENT: CPT

## 2025-01-29 PROCEDURE — 94761 N-INVAS EAR/PLS OXIMETRY MLT: CPT

## 2025-01-29 PROCEDURE — 97162 PT EVAL MOD COMPLEX 30 MIN: CPT

## 2025-01-29 PROCEDURE — 97110 THERAPEUTIC EXERCISES: CPT

## 2025-01-29 PROCEDURE — 2580000003 HC RX 258: Performed by: ORTHOPAEDIC SURGERY

## 2025-01-29 PROCEDURE — 97166 OT EVAL MOD COMPLEX 45 MIN: CPT

## 2025-01-29 PROCEDURE — 85018 HEMOGLOBIN: CPT

## 2025-01-29 PROCEDURE — 2500000003 HC RX 250 WO HCPCS: Performed by: ORTHOPAEDIC SURGERY

## 2025-01-29 PROCEDURE — 80048 BASIC METABOLIC PNL TOTAL CA: CPT

## 2025-01-29 RX ADMIN — ASPIRIN 81 MG: 81 TABLET, COATED ORAL at 08:29

## 2025-01-29 RX ADMIN — CALCIUM CARBONATE-VITAMIN D TAB 500 MG-200 UNIT 1 TABLET: 500-200 TAB at 08:28

## 2025-01-29 RX ADMIN — DIGOXIN 125 MCG: 125 TABLET ORAL at 08:28

## 2025-01-29 RX ADMIN — POTASSIUM CHLORIDE 20 MEQ: 1500 TABLET, EXTENDED RELEASE ORAL at 08:29

## 2025-01-29 RX ADMIN — DOCUSATE SODIUM 50 MG AND SENNOSIDES 8.6 MG 1 TABLET: 8.6; 5 TABLET, FILM COATED ORAL at 08:28

## 2025-01-29 RX ADMIN — OXYCODONE HYDROCHLORIDE AND ACETAMINOPHEN 500 MG: 500 TABLET ORAL at 08:29

## 2025-01-29 RX ADMIN — Medication 1 TABLET: at 08:28

## 2025-01-29 RX ADMIN — BUMETANIDE 2 MG: 1 TABLET ORAL at 08:28

## 2025-01-29 RX ADMIN — SODIUM CHLORIDE, PRESERVATIVE FREE 10 ML: 5 INJECTION INTRAVENOUS at 08:29

## 2025-01-29 RX ADMIN — DULOXETINE HYDROCHLORIDE 60 MG: 60 CAPSULE, DELAYED RELEASE ORAL at 08:28

## 2025-01-29 RX ADMIN — ACETAMINOPHEN 650 MG: 325 TABLET ORAL at 08:28

## 2025-01-29 RX ADMIN — LEVOTHYROXINE SODIUM 200 MCG: 100 TABLET ORAL at 06:55

## 2025-01-29 RX ADMIN — DILTIAZEM HYDROCHLORIDE 120 MG: 120 CAPSULE, COATED, EXTENDED RELEASE ORAL at 08:28

## 2025-01-29 RX ADMIN — RIVAROXABAN 20 MG: 20 TABLET, FILM COATED ORAL at 08:28

## 2025-01-29 RX ADMIN — SODIUM CHLORIDE: 9 INJECTION, SOLUTION INTRAVENOUS at 03:39

## 2025-01-29 RX ADMIN — OXYCODONE 5 MG: 5 TABLET ORAL at 05:20

## 2025-01-29 RX ADMIN — OXYCODONE 5 MG: 5 TABLET ORAL at 13:54

## 2025-01-29 RX ADMIN — GUAIFENESIN 600 MG: 600 TABLET, EXTENDED RELEASE ORAL at 08:28

## 2025-01-29 RX ADMIN — PREGABALIN 300 MG: 75 CAPSULE ORAL at 08:29

## 2025-01-29 RX ADMIN — IPRATROPIUM BROMIDE AND ALBUTEROL SULFATE 1 DOSE: .5; 2.5 SOLUTION RESPIRATORY (INHALATION) at 07:29

## 2025-01-29 ASSESSMENT — PAIN DESCRIPTION - LOCATION
LOCATION: HIP
LOCATION: HIP
LOCATION: HIP;LEG
LOCATION: HIP

## 2025-01-29 ASSESSMENT — PAIN DESCRIPTION - PAIN TYPE
TYPE: SURGICAL PAIN
TYPE: SURGICAL PAIN

## 2025-01-29 ASSESSMENT — PAIN DESCRIPTION - ORIENTATION
ORIENTATION: LEFT

## 2025-01-29 ASSESSMENT — PAIN SCALES - GENERAL
PAINLEVEL_OUTOF10: 4
PAINLEVEL_OUTOF10: 0
PAINLEVEL_OUTOF10: 2
PAINLEVEL_OUTOF10: 5
PAINLEVEL_OUTOF10: 8

## 2025-01-29 ASSESSMENT — PAIN DESCRIPTION - DESCRIPTORS
DESCRIPTORS: ACHING
DESCRIPTORS: ACHING;DISCOMFORT
DESCRIPTORS: ACHING

## 2025-01-29 ASSESSMENT — PAIN - FUNCTIONAL ASSESSMENT: PAIN_FUNCTIONAL_ASSESSMENT: PREVENTS OR INTERFERES SOME ACTIVE ACTIVITIES AND ADLS

## 2025-01-29 ASSESSMENT — PAIN DESCRIPTION - FREQUENCY: FREQUENCY: CONTINUOUS

## 2025-01-29 ASSESSMENT — PAIN DESCRIPTION - ONSET: ONSET: ON-GOING

## 2025-01-29 NOTE — PROGRESS NOTES
Occupational Therapy  Facility/Department: [facility] ICU  Occupational Therapy Initial Assessment    Name: Toshia South  : 1945  MRN: [id number]  Date of Service: 2025    Discharge Recommendations:  Continue to assess pending progress          Patient Diagnosis(es): Primary localized osteoarthritis of left hip  Past Medical History:  has a past medical history of A-fib (HCC), Anxiety, Atrial fibrillation (HCC), Biventricular congestive heart failure (HCC), Bronchiectasis with acute exacerbation (HCC), CAD (coronary artery disease), Chronic pain syndrome, COPD (chronic obstructive pulmonary disease) (HCC), Depression, GERD (gastroesophageal reflux disease), Hypothyroidism, Impaired fasting glucose, Iron deficiency anemia, Osteoporosis, Pulmonary fibrosis (HCC), Sleep apnea, and Subdural hematoma.  Past Surgical History:  has a past surgical history that includes Hysterectomy (1991); Carpal tunnel release (Right, 1999); Total knee arthroplasty (Right, 2021); fracture surgery (Left, 2018); Wrist fracture surgery (Left, 2018); lumbar discectomy (1993); Lumbar disc surgery (02/15/1994); back surgery (1998); back surgery (1999); Carpal tunnel release (Left, 1999); Neck surgery (2002); Carpal tunnel release (Right, 2002); Carpal tunnel release (Left, 2003); back surgery (10/23/2003); back surgery (10/23/2003); Cervical disc surgery (10/25/2004); Cervical disc surgery (2004); Neck surgery (2005); Toe amputation (10/08/2006); Toe amputation (2008); lumbar laminectomy (2008); Toe amputation (10/03/2008); Humerus fracture surgery (Right, 10/03/2010); Knee arthroscopy (Right, 2011); back surgery (2017); knee surgery (Right, 2016); Wrist fracture surgery (Left, 2018); Wrist surgery (Left, 2019); craniotomy (Left, 2022); Nerve Block (N/A, 2024); and Total hip arthroplasty (Left,

## 2025-01-29 NOTE — FLOWSHEET NOTE
Ambulated to bathroom with assist using her walker. Gait steady, tolerated well. Returned to chair at beside. Call light within reach. Chair alarm on for safety

## 2025-01-29 NOTE — PROGRESS NOTES
Comprehensive Nutrition Assessment    Type and Reason for Visit:  Initial    Nutrition Recommendations/Plan:   Encourage protein foods for strengthening     Malnutrition Assessment:  Malnutrition Status:  At risk for malnutrition (01/29/25 0814)    Context:  Acute Illness     Findings of the 6 clinical characteristics of malnutrition:  Energy Intake:  No decrease in energy intake  Weight Loss:  No weight loss     Body Fat Loss:  No body fat loss     Muscle Mass Loss:  No muscle mass loss    Fluid Accumulation:  Mild Extremities   Strength:  Not Performed    Nutrition Assessment:    Increaesd nutrient needs r/t acute injury or trauma, AEB post op hip. Taking PO well post op without c/o for n/v or any chew/swallow issues (recently on minced and moist diet and nectar thicks while admitted to [facility]). Good use of protein foods noted. Hopes to d/c home today with home health.    Nutrition Related Findings:    LLE non pitting edema. Wound Type: Surgical Incision       Current Nutrition Intake & Therapies:    Average Meal Intake: Unable to assess (no PO records)  Average Supplements Intake: None Ordered  ADULT DIET; Regular    Anthropometric Measures:  Height: 165.1 cm (5' 5\")  Ideal Body Weight (IBW): 125 lbs (57 kg)    Admission Body Weight: 63.5 kg (140 lb)  Current Body Weight: 67.4 kg (148 lb 9.6 oz), 118.9 % IBW. Weight Source: Bed scale  Current BMI (kg/m2): 24.7  Usual Body Weight: 67.6 kg (149 lb) (a month ago but then down to 140#. 133# 3 months ago)     % Weight Change (Calculated): -0.3  Weight Adjustment For: No Adjustment                 BMI Categories: Normal Weight (BMI 18.5-24.9)    Estimated Daily Nutrient Needs:  Energy Requirements Based On: Kcal/kg  Weight Used for Energy Requirements: Current  Energy (kcal/day): 3593-3966 (25-30)  Weight Used for Protein Requirements: Current  Protein (g/day): 81-94 (1.2-1.4)  Method Used for Fluid Requirements: 1 ml/kcal  Fluid (ml/day): 2000    Nutrition

## 2025-01-29 NOTE — PLAN OF CARE
Problem: Discharge Planning  Goal: Discharge to home or other facility with appropriate resources  1/29/2025 1411 by Abbi Pinon RN  Outcome: Adequate for Discharge  1/29/2025 0919 by Abbi Pinon RN  Outcome: Progressing     Problem: Pain  Goal: Verbalizes/displays adequate comfort level or baseline comfort level  1/29/2025 1411 by Abbi Pinon RN  Outcome: Adequate for Discharge  1/29/2025 0919 by Abbi Pinon RN  Outcome: Progressing     Problem: Chronic Conditions and Co-morbidities  Goal: Patient's chronic conditions and co-morbidity symptoms are monitored and maintained or improved  1/29/2025 1411 by Abbi Pinon RN  Outcome: Adequate for Discharge  1/29/2025 0919 by Abbi Pinon RN  Outcome: Progressing     Problem: Safety - Adult  Goal: Free from fall injury  1/29/2025 1411 by Abbi Pinon RN  Outcome: Adequate for Discharge  1/29/2025 0919 by Abbi Pinon RN  Outcome: Progressing     Problem: ABCDS Injury Assessment  Goal: Absence of physical injury  1/29/2025 1411 by Abbi Pinon RN  Outcome: Adequate for Discharge  1/29/2025 0919 by Abbi Pinon RN  Outcome: Progressing     Problem: Skin/Tissue Integrity  Goal: Skin integrity remains intact  1/29/2025 1411 by Abbi Pinon RN  Outcome: Adequate for Discharge  1/29/2025 0919 by Abbi Pinon RN  Outcome: Progressing     Problem: Skin/Tissue Integrity - Adult  Goal: Incisions, wounds, or drain sites healing without S/S of infection  1/29/2025 1411 by Abbi Pinon RN  Outcome: Adequate for Discharge  1/29/2025 0919 by Abbi Pinon RN  Outcome: Progressing     Problem: Musculoskeletal - Adult  Goal: Return mobility to safest level of function  1/29/2025 1411 by Abbi Pinon RN  Outcome: Adequate for Discharge  1/29/2025 0919 by Abbi Pinon RN  Outcome: Progressing     Problem: Nutrition Deficit:  Goal: Optimize nutritional status  1/29/2025 1411 by Abbi Pinon RN  Outcome: Adequate for Discharge  1/29/2025 0919 by Zi

## 2025-01-29 NOTE — FLOWSHEET NOTE
Talked with patients son about discharge plans. States if she does okay walking today with therapy his father and him are willing to take her home today. Informed son we will keep him updated later today with discharge plans.

## 2025-01-29 NOTE — PROGRESS NOTES
Pt resting in bed with eyes closed. Pt awakens easily. Vitals and assessment as charted. Pt requesting to sit at the edge of the bed. Writer assisted pt to the edge of the bed. Pt denies any further needs at this time. Call light within reach. Bed alarm on.

## 2025-01-29 NOTE — CARE COORDINATION
Met with patient and  to review discharge plan. Patient would like to go home and  agreeable. They have Count includes the [facility] lined up to begin therapy. Verified Count includes the [facility] with Jessica Richmond RN from [facility]. Family agreeable to follow PT recommendation for home or SNF. Will await PT eval today.

## 2025-01-29 NOTE — DISCHARGE INSTR - ACTIVITY
As tolerated using a walker  Keep dressing clean, dry and intact.  May shower. Keep dressing in place. Pat dry   Ice to left hip dressing as needed for pain and swelling.

## 2025-01-29 NOTE — RT PROTOCOL NOTE
RESPIRATORY ASSESSMENT PROTOCOL                                                                                              Patient Name: Toshia South Room#: I304/I304-01 : 1945     Admitting diagnosis: Primary localized osteoarthritis of left hip [M16.12]  Primary osteoarthritis of left hip [M16.12]       Medical History:   Past Medical History:   Diagnosis Date    A-fib ([facility])     Anxiety     Atrial fibrillation (HCC)     Biventricular congestive heart failure (HCC)     Bronchiectasis with acute exacerbation ([facility]) 2022    CAD (coronary artery disease)     Chronic pain syndrome     dilaudid infusion pump    COPD (chronic obstructive pulmonary disease) (HCC)     Depression     GERD (gastroesophageal reflux disease)     Hypothyroidism     Impaired fasting glucose     Iron deficiency anemia     Osteoporosis     Pulmonary fibrosis ([facility]) 2022    Sleep apnea     Subdural hematoma 2022       PATIENT ASSESSMENT    LABORATORY DATA  Hematology:   Lab Results   Component Value Date/Time    WBC 6.9 2025 10:02 AM    RBC 3.81 2025 10:02 AM    HGB 10.1 2025 05:20 AM    HCT 31.1 2025 05:20 AM     2025 10:02 AM     Chemistry:    Lab Results   Component Value Date/Time    PHART 7.346 12/10/2024 01:36 PM    ZOY2FQM 59.8 12/10/2024 01:36 PM    PO2ART 97.2 12/10/2024 01:36 PM    E6VBTMDB 96.9 12/10/2024 01:36 PM    ARH2JIZ 32.0 12/10/2024 01:36 PM    PBEA 6.0 2024 05:06 AM    NBEA 1.2 2024 06:22 PM       VITALS  Pulse: 72   Respirations: 15  BP: (!) 116/43  SpO2: 99 % O2 Device: Nasal cannula 4 lpm  Temp: 97.2 °F (36.2 °C)    SKIN COLOR  [x] Normal  [] Pale  [] Dusky  [] Cyanotic    RESPIRATORY PATTERN  [x] Normal  [] Dyspnea  [] Cheyne-Granger  [] Kussmaul  [] Biots    AMBULATORY  [x] Yes  [] No  [x] With Assistance    PEAK FLOW  Predicted:     Personal Best:            Patient Acuity 0 1 2 3 4 Score   Level of Consciousness (LOC) [x]  Alert & Oriented or

## 2025-01-29 NOTE — PLAN OF CARE
Problem: Discharge Planning  Goal: Discharge to home or other facility with appropriate resources  Outcome: Progressing     Problem: Pain  Goal: Verbalizes/displays adequate comfort level or baseline comfort level  1/29/2025 0919 by Abbi Pinon RN  Outcome: Progressing  1/28/2025 2240 by Bianca Bonner RN  Outcome: Progressing     Problem: Chronic Conditions and Co-morbidities  Goal: Patient's chronic conditions and co-morbidity symptoms are monitored and maintained or improved  Outcome: Progressing     Problem: Safety - Adult  Goal: Free from fall injury  1/29/2025 0919 by Abbi Pinon RN  Outcome: Progressing  1/28/2025 2240 by Bianca Bonner RN  Outcome: Progressing     Problem: ABCDS Injury Assessment  Goal: Absence of physical injury  Outcome: Progressing     Problem: Skin/Tissue Integrity  Goal: Skin integrity remains intact  Outcome: Progressing     Problem: Skin/Tissue Integrity - Adult  Goal: Incisions, wounds, or drain sites healing without S/S of infection  1/29/2025 0919 by Abbi Pinon RN  Outcome: Progressing  1/28/2025 2240 by Bianca Bonner RN  Outcome: Progressing     Problem: Musculoskeletal - Adult  Goal: Return mobility to safest level of function  1/29/2025 0919 by Abbi Pinon RN  Outcome: Progressing  1/28/2025 2240 by Bianca Bonner RN  Outcome: Progressing     Problem: Nutrition Deficit:  Goal: Optimize nutritional status  1/29/2025 0919 by Abbi Pinon RN  Outcome: Progressing  1/29/2025 0816 by Jose Eduardo Conn, RD, LD  Outcome: Progressing  Flowsheets (Taken 1/29/2025 0816)  Nutrient intake appropriate for improving, restoring, or maintaining nutritional needs:   Monitor oral intake, labs, and treatment plans   Recommend appropriate diets, oral nutritional supplements, and vitamin/mineral supplements

## 2025-01-29 NOTE — PROGRESS NOTES
Pt called out to get back into the bed. Writer assisted pt to the BSC and then into the bed. Pt is A&O x4. Vitals and assessment as charted. Pt rated her pain a 7 out of 10 in her left hip. Pt repositioned into the bed. Patients pedal pulse 3+ to LLE. Pt denies any further needs at this time. Call light within reach. Bed alarm on.

## 2025-01-29 NOTE — FLOWSHEET NOTE
Sleeping in recliner. Awake for vitals and assessment. Alert and oriented x 4. States pain to left hip mild this morning . Dressing clean and intact with a small amount of old drainage noted. Good circulation checks  this morning. Foot of chair up. Call light within reach. Chair alarm on for safety

## 2025-01-29 NOTE — CONSULTS
Linnette Kiera, APRN-CNP -- Hospitalist  Progress Note 1/29/25    SUBJECTIVE:    Patient seen for f/u of post op medical management of COPD, Chronic Respiratory failure.   She is POD # 1 s/p left THR.  Her pain is well controlled.  She denies nausea and vomiting.  Last BM was prior to surgery.  She denies any chest pain, palpitations or shortness of breath.  She is doing well with PT    ROS:   Constitutional: negative  for fevers, and negative for chills.  Respiratory: negative for shortness of breath, negative for cough, and negative for wheezing  Cardiovascular: negative for chest pain, and negative for palpitations  Gastrointestinal: negative for abdominal pain, negative for nausea,negative for vomiting, negative for diarrhea, and negative for constipation    OBJECTIVE:    Vitals:   Temp: 97.2 °F (36.2 °C)  BP: (!) 116/43  Respirations: 15  Pulse: 72  SpO2: 99 %    24HR INTAKE/OUTPUT:    Intake/Output Summary (Last 24 hours) at 1/29/2025 0822  Last data filed at 1/29/2025 0502  Gross per 24 hour   Intake 3089.7 ml   Output 1850 ml   Net 1239.7 ml      -----------------------------------------------------------------    Exam:  GEN:    Awake, alert and oriented x3.   EYES:  EOMI, pupils equal   NECK: Supple. No lymphadenopathy.  No carotid bruit  CVS:    regular rate and rhythm, no audible murmur  PULM:   diminished but clear with a few scattered rales , no acute respiratory distress  ABD:    Bowels sounds normal.  Abdomen is soft.  No distention.  no tenderness to palpation.   EXT:   no edema bilaterally .  No calf tenderness.   NEURO: Moves all extremities.  Motor and sensory are grossly intact   SKIN:  Incision is dressed with minimal drainage.     Diagnostic Data:  Lab Results   Component Value Date    HGB 10.1 (L) 01/29/2025      Lab Results   Component Value Date    GLUCOSE 121 (H) 01/29/2025    BUN 17 01/29/2025    CREATININE 0.5 01/29/2025     01/29/2025    K 4.1 01/29/2025    CALCIUM 8.8 01/29/2025      01/29/2025    CO2 31 01/29/2025       PROBLEM LIST:  Principal Problem:    Primary osteoarthritis of left hip  Active Problems:    IPF (idiopathic pulmonary fibrosis) (HCC)    COPD (chronic obstructive pulmonary disease) (HCC)    Bronchiectasis (HCC)    Chronic pain    Chronic respiratory failure with hypoxia (J96.11)  Resolved Problems:    * No resolved hospital problems. *      ASSESSMENT / PLAN:    Post op medical management for COPD, Chronic Respiratory Failure with hypoxia  Post op day # 1 s/p left THR  Continue current physical and occupational therapy  Continue DVT prophylaxis  Condition is stable  Treatment plan:   PT/OT  SS for DC planning  Up with assistance  Ice to hip  Trend Labs  Imaging: no further imaging studies ordered today  Medications:   Continue Tylenol, Xarelto, Lyrica, Senokot,   Continue Norco  Stop  MS  Continue Zanaflex  Medication Monitoring / High Risk Medications: none      COPD/Bronchiectasis/IPF/Chronic Respiratory Failure with Hypoxia    Condition is a chronic stable condition  Treatment plan:   Monitor respiratory status  Supplemental oxygen  IS  Up to chair  Imaging: no further imaging studies ordered today  Medications:   Continue Nebs  Continue Stiolto, Mucinex,      Nutrition status:   at risk for malnutrition  Dietician consult initiated     Hospital Prophylaxis:   DVT: Xarelto   Stress Ulcer:  na      Acute post op blood loss anemia  Monitor H/H-10.1    Discharge plan is  pending.   Transfer to [facility]    Linnette Alvarez, RYAN - CNP , RYAN-CNP  1/29/2025  8:22 AM

## 2025-01-29 NOTE — FLOWSHEET NOTE
IV removed. Dressing applied. Tolerated well. To bathroom with assist.voided and dressed to go home.  Tolerated well.returned to chair at bedside.

## 2025-01-29 NOTE — PROGRESS NOTES
Department of Orthopedic Surgery  Progress Note    Subjective:  No complaints.  Doing well postoperatively. Overall, her pain has been reasonably controlled.  States pain is 4 out of 10 currently.  Pain localized to the hip.  Denies calf pain.  No N/T.      Vitals  VITALS:  BP (!) 116/43   Pulse 72   Temp 97.2 °F (36.2 °C) (Temporal)   Resp 15   Ht 1.651 m (5' 5\")   Wt 67.4 kg (148 lb 9.6 oz)   SpO2 99%   BMI 24.73 kg/m²     PHYSICAL EXAM:  General: in no apparent distress, well developed and well nourished, non-toxic, alert, and oriented times 3.  Nasal cannula in place   Left Lower Extremity  Incision:  Aquacel dressing in place, clean, dry, and intact.  Approximately quarter-sized area of old blood noted at distal incision.  No saturation or bogginess  Neurologic:  Moving lower extremity as appropriate following sugery.  Able to dorsiflex and plantar flex foot/ankle.  Intact to gross sensation and touch in lower extremity.  Vascular: present 2+ lower extremity.  Calf soft, non-tender. No evidence of DVT seen on physical exam.  Negative Billie's sign.  Abnormal Exam findings:  none    LABS:  Hgb:    Lab Results   Component Value Date/Time    HGB 10.1 01/29/2025 05:20 AM     BMP:    Lab Results   Component Value Date/Time     01/29/2025 05:20 AM    K 4.1 01/29/2025 05:20 AM     01/29/2025 05:20 AM    CO2 31 01/29/2025 05:20 AM    BUN 17 01/29/2025 05:20 AM    CREATININE 0.5 01/29/2025 05:20 AM    CALCIUM 8.8 01/29/2025 05:20 AM    GFRAA >60 09/09/2022 07:25 PM    LABGLOM >90 01/29/2025 05:20 AM    LABGLOM >90 04/24/2024 09:25 AM    GLUCOSE 121 01/29/2025 05:20 AM       ASSESSMENT AND PLAN:  Post operative day 1 status post left total hip arthroplasty.    1:  Weight bearing as tolerated  2:  Continue Deep venous thrombosis prophylaxis.  DSUTY hose and will resume Xarleto 20 mg daily  3:  Continue physical therapy.  Home PT vs SNF  4:  D/C Plan:  Home vs [facility] pending on how patient performs

## 2025-01-29 NOTE — CARE COORDINATION
Verified with patient and  plan for discharge is home with [facility], they agree and son will transport patient home.

## 2025-01-29 NOTE — PROGRESS NOTES
Physical Therapy  Facility/Department: [facility] ICU  Daily Treatment Note  NAME: Toshia South  : 1945  MRN: [id number]    Date of Service: 2025    Discharge Recommendations:  Continue to assess pending progress, Subacute/Skilled Nursing Facility, IP Rehab     Patient Diagnosis(es): L MARYJO  Assessment  Assessment: Pt. performed seated and reclined B LE exercises x15.Transfers:CGA.Gait with WW 10ftx1, CGA for safety. Pt. is L LE WBAT and reviewed precautions and restrictions.  Activity Tolerance: Patient tolerated treatment well    Plan  Physical Therapy Plan  General Plan: 2 times a day 7 days a week  Specific Instructions for Next Treatment: Once daily on weekends  Current Treatment Recommendations: Strengthening;ROM;Balance training;Functional mobility training;Transfer training;Safety education & training;Neuromuscular re-education;Stair training;Gait training;Home exercise program;Patient/Caregiver education & training;Manual;Therapeutic activities;Endurance training    Restrictions  Restrictions/Precautions  Restrictions/Precautions: Fall Risk, General Precautions, Weight Bearing  Activity Level: Up with Assist  Lower Extremity Weight Bearing Restrictions  Left Lower Extremity Weight Bearing: Weight Bearing As Tolerated     Subjective   Pt. in chair upon arrival agreeable to therapy   Objective  Bed Mobility Training  Bed Mobility Training: No  Transfer Training  Transfer Training: Yes  Overall Level of Assistance: Contact-guard assistance;Assist X1  Interventions: Verbal cues  Sit to Stand: Contact-guard assistance;Assist X1  Stand to Sit: Contact-guard assistance;Assist X1  Gait Training  Right Side Weight Bearing: As tolerated  Left Side Weight Bearing: As tolerated  Gait  Gait Training: Yes  Left Side Weight Bearing: As tolerated  Right Side Weight Bearing: As tolerated  Overall Level of Assistance: Contact-guard assistance;Assist X1  Distance (ft): 10 Feet  Assistive Device: Walker, rolling;Gait

## 2025-01-29 NOTE — FLOWSHEET NOTE
Reviewed discharge instructions with patient and her . Voices understanding. To front entrance per w/c for discharge home . Belongings sent with patient.

## 2025-01-29 NOTE — CARE COORDINATION
01/29/25 1227   IMM Letter   Observation Status Letter date given: 01/29/25   Observation Status Letter time given: 1126   Observation Status Letter given to Patient/Family/Significant other/Guardian/POA/by: To patient/MARVIN Christine case manager.

## 2025-01-29 NOTE — DISCHARGE SUMMARY
ADMISSION DIAGNOSIS:  Left hip primary osteoarthritis.     PROCEDURES WHILE INPATIENT:  Left total hip arthroplasty performed on 1/28/2025.     HOSPITAL COURSE:  The patient presented to the hospital on day of surgery. Surgery was completed without complications.  Following surgery, the patient was admitted to regular nursing floor for postoperative pain control and physical therapy.  The patient's pain was controlled with oral and IV medications. Patient had pharmacologic and mechanical DVT prophylaxis. The patient began working with physical therapy and occupational therapy. Case management was consulted for assistance with discharge planning.  The patient was deemed safe for discharge and was subsequently discharged following an uncomplicated postoperative course.    DISPOSITION:  Home with home PT     CONDITION UPON DISCHARGE:  Stable.     DISCHARGE MEDICATIONS:  The patient will resume home pre-hospital medication regimen.   New medications include:   Norco 5/325 mg 1 tablets every 4 hours as needed for pain  Will resume Xarelto 20 mg/day and 4 L of O2    INSTRUCTIONS:  The patient may bear weight as tolerated on the operative extremity. Dressing may be changed as needed.  Keep incision dry.       FOLLOW UP:  Follow up with Dr. Larsen in 2 weeks.   Follow up with outpatient physical therapy in 2-3 days as scheduled.    Follow up with primary care physician within 1 month, or sooner as needed.

## 2025-01-29 NOTE — PROGRESS NOTES
Pt resting in the bed with eyes closed. Pt awakens easily. Vitals and assessment as charted. Writer assisted pt to the BSC and then into the chair. Pt rated her pain a 8 out of 10 in her left hip/leg. Pt given Oxycodone 5mg PO. Pt denies any further needs at this time. Call light within reach.

## 2025-01-29 NOTE — PLAN OF CARE
Problem: Pain  Goal: Verbalizes/displays adequate comfort level or baseline comfort level  Outcome: Progressing  Note: Pt is able to verbalize when in pain. Pt given pain medications as needed. Will continue to monitor.      Problem: Safety - Adult  Goal: Free from fall injury  Outcome: Progressing  Note: Bed in low position. Wheels locked. Bed alarm on. 2/4 side rails are up. Non-skid socks on. Fall band on. Call light within reach.     Problem: Skin/Tissue Integrity - Adult  Goal: Incisions, wounds, or drain sites healing without S/S of infection  Outcome: Progressing  Note: Dressing remains intact, will continue to monitor.      Problem: Musculoskeletal - Adult  Goal: Return mobility to safest level of function  Outcome: Progressing  Note: Pt up with one assist with a walker, will continue to monitor.

## 2025-01-29 NOTE — DISCHARGE INSTR - DIET
Good nutrition is important when healing from an illness, injury, or surgery.  Follow any nutrition recommendations given to you during your hospital stay.   If you were given an oral nutrition supplement while in the hospital, continue to take this supplement at home.  You can take it with meals, in-between meals, and/or before bedtime. These supplements can be purchased at most local grocery stores, pharmacies, and chain [employer]-stores.   If you have any questions about your diet or nutrition, call the hospital and ask for the dietitian.          General diet

## 2025-01-29 NOTE — PROGRESS NOTES
Physical Therapy  Facility/Department: [facility] ICU  Physical Therapy Initial Assessment    Name: Toshia South  : 1945  MRN: [id number]  Date of Service: 2025    Discharge Recommendations:  Continue to assess pending progress, Subacute/Skilled Nursing Facility, IP Rehab      Past Medical History:  has a past medical history of A-fib (HCC), Anxiety, Atrial fibrillation (HCC), Biventricular congestive heart failure (HCC), Bronchiectasis with acute exacerbation (HCC), CAD (coronary artery disease), Chronic pain syndrome, COPD (chronic obstructive pulmonary disease) (HCC), Depression, GERD (gastroesophageal reflux disease), Hypothyroidism, Impaired fasting glucose, Iron deficiency anemia, Osteoporosis, Pulmonary fibrosis (HCC), Sleep apnea, and Subdural hematoma.  Past Surgical History:  has a past surgical history that includes Hysterectomy (1991); Carpal tunnel release (Right, 1999); Total knee arthroplasty (Right, 2021); fracture surgery (Left, 2018); Wrist fracture surgery (Left, 2018); lumbar discectomy (1993); Lumbar disc surgery (02/15/1994); back surgery (1998); back surgery (1999); Carpal tunnel release (Left, 1999); Neck surgery (2002); Carpal tunnel release (Right, 2002); Carpal tunnel release (Left, 2003); back surgery (10/23/2003); back surgery (10/23/2003); Cervical disc surgery (10/25/2004); Cervical disc surgery (2004); Neck surgery (2005); Toe amputation (10/08/2006); Toe amputation (2008); lumbar laminectomy (2008); Toe amputation (10/03/2008); Humerus fracture surgery (Right, 10/03/2010); Knee arthroscopy (Right, 2011); back surgery (2017); knee surgery (Right, 2016); Wrist fracture surgery (Left, 2018); Wrist surgery (Left, 2019); craniotomy (Left, 2022); Nerve Block (N/A, 2024); and Total hip arthroplasty (Left, 2025).    Assessment  Assessment:

## 2025-01-30 ENCOUNTER — CARE COORDINATION (OUTPATIENT)
Dept: CARE COORDINATION | Age: 80
End: 2025-01-30

## 2025-01-30 DIAGNOSIS — M16.12 PRIMARY OSTEOARTHRITIS OF LEFT HIP: Primary | ICD-10-CM

## 2025-01-30 PROCEDURE — 1111F DSCHRG MED/CURRENT MED MERGE: CPT | Performed by: INTERNAL MEDICINE

## 2025-01-31 NOTE — CARE COORDINATION
she was able to come back home. Has some pain to left hip. Has not taken any medications for this. Has [place] available at home. Is using ice packs throughout the day. Feels the ice is helping. Jose A home health RN out yesterday for visit. Reports this went well. States that therapy is suppose to come out today between 11:30-12. Reports appetite good. Reports she finally had a BM today. Getting around well in the home with walker.  Does admit to chronic cough. States she was taking OTC Robitussin but does not feel it is helping. Denied fever. Not bringing anything up with cough. Asking if writer can update PCP.     Offered patient enrollment in the Remote Patient Monitoring (RPM) program for in-home monitoring: Patient is not eligible for RPM program because: affiliate provider.     Medications Reviewed:   1111F entered: yes      PCP/Specialist follow up:   Future Appointments         Provider Specialty Dept Phone    2/10/2025 10:45 AM Jong Moraes MD Internal Medicine [phone]    7/21/2025 2:00 PM Boy Dennis MD Pulmonology [phone]    7/24/2025 10:40 AM Jose Miner MD Cardiology [phone]            Follow Up:   Plan for next ACM outreach in approximately 1 week to complete:  - disease specific assessments  - medication review   - goal progression  - education   - home health, RN, PT .   Patient  is agreeable to this plan.

## 2025-02-07 ENCOUNTER — CARE COORDINATION (OUTPATIENT)
Dept: CARE COORDINATION | Age: 80
End: 2025-02-07

## 2025-02-07 NOTE — CARE COORDINATION
Ambulatory Care Coordination Note     2025      Patient Current Location:  Home: [address]  [place] [address]     ACM contacted the patient by telephone. Verified name and  with patient as identifiers.         ACM: Delphine Gao RN     Challenges to be reviewed by the provider   Additional needs identified to be addressed with provider No  none               Method of communication with provider: none.    Utilization: Patient has not had any utilization since our last call.     Care Summary Note: Spoke with Toshia. Reports she is doing fairly well. States she has more pain now than she did last week. Reports she can not take Tylenol or the Norco- states she gets constipation easily with pain medications. Discussed use of Motrin and ice. States she has been using ice and has found that helpful. Also discussed using OTC lidocaine patch near area (not directly over surgical incision) or roll on muscle relief medication. Reports she has lidocaine patches at home she may try. [facility]. PT session today. RN was also out for visit today. Getting around well at home with walker. States she has PCP and Ortho apt next week. Appointment with  Ortho at [facility] on - will ask about alternative pain relief. Denied wanting ACM reach out to Ortho today for alternatives. Questioned about cough- reports she finally has gotten rid of her cough after several weeks/months. Not using Tessalon at this time but did get last week. Having regular BM's. Appetite good. Denied any new needs today.    Medications Reviewed:   Completed during this call      PCP/Specialist follow up:   Future Appointments         Provider Specialty Dept Phone    2/10/2025 10:45 AM Jong Moraes MD Internal Medicine [phone]    2025 2:00 PM Boy Dennis MD Pulmonology [phone]    2025 10:40 AM Jose Miner MD Cardiology [phone]            Follow Up:   Plan for

## 2025-02-10 PROBLEM — I50.82 BIVENTRICULAR HEART FAILURE (HCC): Status: RESOLVED | Noted: 2023-08-04 | Resolved: 2025-02-10

## 2025-02-10 PROBLEM — J15.9 COMMUNITY ACQUIRED BACTERIAL PNEUMONIA: Status: RESOLVED | Noted: 2023-08-02 | Resolved: 2025-02-10

## 2025-02-10 PROBLEM — D68.69 SECONDARY HYPERCOAGULABLE STATE: Chronic | Status: ACTIVE | Noted: 2024-09-15

## 2025-02-10 PROBLEM — E44.1 MALNUTRITION OF MILD DEGREE: Status: RESOLVED | Noted: 2023-08-04 | Resolved: 2025-02-10

## 2025-02-10 PROBLEM — J96.11 CHRONIC RESPIRATORY FAILURE WITH HYPOXIA (HCC): Chronic | Status: ACTIVE | Noted: 2025-01-20

## 2025-02-10 PROBLEM — J47.9 BRONCHIECTASIS (HCC): Status: RESOLVED | Noted: 2023-08-04 | Resolved: 2025-02-10

## 2025-02-10 PROBLEM — J44.1 COPD EXACERBATION (HCC): Status: RESOLVED | Noted: 2023-08-02 | Resolved: 2025-02-10

## 2025-02-10 PROBLEM — J96.21 ACUTE ON CHRONIC RESPIRATORY FAILURE WITH HYPOXIA AND HYPERCAPNIA (HCC): Status: RESOLVED | Noted: 2023-08-05 | Resolved: 2025-02-10

## 2025-02-10 PROBLEM — Z86.79 HISTORY OF SUBDURAL HEMATOMA: Chronic | Status: ACTIVE | Noted: 2022-08-22

## 2025-02-10 PROBLEM — M16.12 PRIMARY OSTEOARTHRITIS OF LEFT HIP: Status: RESOLVED | Noted: 2025-01-28 | Resolved: 2025-02-10

## 2025-02-10 PROBLEM — J18.9 MULTIFOCAL PNEUMONIA: Status: RESOLVED | Noted: 2023-08-04 | Resolved: 2025-02-10
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
## 2025-02-10 PROBLEM — I48.0 PAF (PAROXYSMAL ATRIAL FIBRILLATION) (HCC): Chronic | Status: ACTIVE | Noted: 2022-02-01

## 2025-02-10 PROBLEM — M16.12 OSTEOARTHRITIS OF LEFT HIP: Status: RESOLVED | Noted: 2024-12-13 | Resolved: 2025-02-10

## 2025-02-10 PROBLEM — J84.112 IPF (IDIOPATHIC PULMONARY FIBROSIS) (HCC): Chronic | Status: ACTIVE | Noted: 2022-04-29
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
## 2025-02-10 PROBLEM — J96.22 ACUTE ON CHRONIC RESPIRATORY FAILURE WITH HYPOXIA AND HYPERCAPNIA (HCC): Status: RESOLVED | Noted: 2023-08-05 | Resolved: 2025-02-10

## 2025-02-10 PROBLEM — A49.8 CITROBACTER INFECTION: Status: RESOLVED | Noted: 2023-08-09 | Resolved: 2025-02-10

## 2025-02-10 PROBLEM — J96.01 ACUTE RESPIRATORY FAILURE WITH HYPOXIA (HCC): Status: RESOLVED | Noted: 2024-09-29 | Resolved: 2025-02-10

## 2025-02-10 PROBLEM — I50.33 ACUTE ON CHRONIC DIASTOLIC CHF (CONGESTIVE HEART FAILURE) (HCC): Status: RESOLVED | Noted: 2024-09-29 | Resolved: 2025-02-10

## 2025-02-10 PROBLEM — E44.0 MODERATE MALNUTRITION: Status: RESOLVED | Noted: 2024-09-16 | Resolved: 2025-02-10

## 2025-02-10 PROBLEM — J96.20 ACUTE ON CHRONIC RESPIRATORY FAILURE (HCC): Status: RESOLVED | Noted: 2024-11-03 | Resolved: 2025-02-10

## 2025-02-18 ENCOUNTER — HOSPITAL ENCOUNTER (OUTPATIENT)
Dept: GENERAL RADIOLOGY | Age: 80
Discharge: HOME | End: 2025-02-20
Payer: MEDICARE

## 2025-02-18 ENCOUNTER — HOSPITAL ENCOUNTER (OUTPATIENT)
Age: 80
Discharge: HOME | End: 2025-02-20
Payer: MEDICARE

## 2025-02-18 DIAGNOSIS — J96.11 CHRONIC RESPIRATORY FAILURE WITH HYPOXIA (HCC): Chronic | ICD-10-CM

## 2025-02-18 DIAGNOSIS — J44.9 CHRONIC OBSTRUCTIVE PULMONARY DISEASE, UNSPECIFIED COPD TYPE (HCC): ICD-10-CM

## 2025-02-18 DIAGNOSIS — Z99.81 OXYGEN DEPENDENT: Chronic | ICD-10-CM

## 2025-02-18 DIAGNOSIS — R05.1 ACUTE COUGH: ICD-10-CM

## 2025-02-18 PROCEDURE — 71046 X-RAY EXAM CHEST 2 VIEWS: CPT

## 2025-02-20 ENCOUNTER — CARE COORDINATION (OUTPATIENT)
Dept: CARE COORDINATION | Age: 80
End: 2025-02-20

## 2025-02-20 NOTE — CARE COORDINATION
Ambulatory Care Coordination Note     2025      Patient Current Location:  Home:  [address] [place] [id number]     ACM contacted the patient by telephone. Verified name and  with patient as identifiers.         ACM: Delphine Gao RN     Challenges to be reviewed by the provider   Additional needs identified to be addressed with provider No  none               Method of communication with provider: none.    Utilization: Patient has not had any utilization since our last call.     Care Summary Note: Call placed to patient. Briefly spoke with patient as she was expecting home health to arrive anytime. Denied any changes from last outreach. Mild pain but tolerable. Continues with ice for pain control. Therapy going well. Had PCP appointment on 2/10 and ortho follow up on . Cough has returned and she had Chest XR done earlier this week- waiting on results. Denied any new needs from writer today but appreciated the call checking in on her.       PCP/Specialist follow up:   Future Appointments         Provider Specialty Dept Phone    2025 8:30 AM Jong Moraes MD Internal Medicine [phone]    2025 2:00 PM Boy Dennis MD Pulmonology [phone]    2025 10:40 AM Jose Miner MD Cardiology [phone]            Follow Up:   Plan for next AC outreach in approximately 2 weeks to complete:  - disease specific assessments  - medication review   - goal progression.   Patient  is agreeable to this plan.

## 2025-03-06 ENCOUNTER — CARE COORDINATION (OUTPATIENT)
Dept: CARE COORDINATION | Age: 80
End: 2025-03-06

## 2025-03-06 NOTE — TELEPHONE ENCOUNTER
Thank you for the update.  I am in the office until noon tomorrow so if home health nurse can let us know in the AM how Sary is doing and if she needs anything that would be fantastic.

## 2025-03-10 ENCOUNTER — CARE COORDINATION (OUTPATIENT)
Dept: CARE COORDINATION | Age: 80
End: 2025-03-10

## 2025-03-10 NOTE — CARE COORDINATION
Ambulatory Care Coordination Note     3/10/2025      Patient Current Location:  Home: [address] [place] [address] [place] [address]     ACM contacted the patient by telephone. Verified name and  with patient as identifiers.         ACM: Delphine Gao RN     Challenges to be reviewed by the provider   Additional needs identified to be addressed with provider Yes  home health care-updates on leg               Method of communication with provider: chart routing.    Utilization: Patient has not had any utilization since our last call.     Care Summary Note: Call placed to patient. Spoke with Sary who states she is doing ok. Voiced that home health nurse did come out Friday. They assessed her leg and \"were not worried about blood clots but want her to keep eye on it.\" Sary voices concern still on one particular area on her left leg. States that it still is red and hard. Swelling still present. Pain no worse than last week. Reports that she has a scheduled follow up appointment tomorrow, 3/11 with Dr. Larsen at [facility]. She plans to voice her concerns to him tomorrow. Denied any new needs. Appreciative of writer calling back to check in on her. Agreeable to writer updating PCP.        Medications Reviewed:   Patient denies any changes with medications and reports taking all medications as prescribed.    PCP/Specialist follow up:   Future Appointments         Provider Specialty Dept Phone    2025 11:00 AM Jong Moraes MD Internal Medicine [phone]    2025 2:00 PM Boy Dennis MD Pulmonology [phone]    2025 10:40 AM Jose Miner MD Cardiology [phone]            Follow Up:   Plan for next [facility] outreach in approximately 2 weeks to complete:  - disease specific assessments  - medication review   - goal progression  - possible graduation from  .   Patient  is agreeable to this plan.

## 2025-03-10 NOTE — TELEPHONE ENCOUNTER
MD at bedside and assessed sharp pain sensation. Patient had a drop of blood at sites MD tested for sharp pain on arms. RN cleansed with alcohol pad and band aids applied.   MD came back to assess patient and updated father on plan of care and sharp pain assessment. Thank you!

## 2025-03-25 ENCOUNTER — TRANSCRIBE ORDERS (OUTPATIENT)
Dept: ADMINISTRATIVE | Age: 80
End: 2025-03-25

## 2025-03-25 ENCOUNTER — HOSPITAL ENCOUNTER (OUTPATIENT)
Dept: VASCULAR LAB | Age: 80
Discharge: HOME | End: 2025-03-27
Payer: MEDICARE

## 2025-03-25 DIAGNOSIS — M79.662 PAIN OF LEFT CALF: Primary | ICD-10-CM

## 2025-03-25 DIAGNOSIS — M79.662 PAIN OF LEFT CALF: ICD-10-CM

## 2025-03-25 PROCEDURE — 93971 EXTREMITY STUDY: CPT

## 2025-03-25 PROCEDURE — 93971 EXTREMITY STUDY: CPT | Performed by: SURGERY

## 2025-03-31 ENCOUNTER — CARE COORDINATION (OUTPATIENT)
Dept: CARE COORDINATION | Age: 80
End: 2025-03-31

## 2025-03-31 NOTE — CARE COORDINATION
Ambulatory Care Coordination Note     3/31/2025 2:23 PM     Patient Current Location:  Home:  [address] [place] [id number]     ACM contacted the patient by telephone. Verified name and  with patient as identifiers.         ACM: Delphine Gao RN     Challenges to be reviewed by the provider   Additional needs identified to be addressed with provider No  none               Method of communication with provider: none.    Utilization: Patient has not had any utilization since our last call.     Care Summary Note: Spoke with Sary. Reports she has been doing pretty well. Getting along well with cane. States she has little bit of swelling in that left leg yet but think overall improving. Denies pain, states only if standing for longer period of time. Home health therapy is completed, continues with [facility]. Had Ortho follow up with [facility] few weeks ago, plan to follow back up in 2 months/May. Denied any new needs today.     Medications Reviewed:   Patient denies any changes with medications and reports taking all medications as prescribed.    PCP/Specialist follow up:   Future Appointments         Provider Specialty Dept Phone    2025 11:00 AM Jong Moraes MD Internal Medicine [phone]    2025 2:00 PM Boy Dennis MD Pulmonology [phone]    2025 10:40 AM Jose Miner MD Cardiology [phone]            Follow Up:   Plan for next [facility] outreach in approximately 3 weeks to complete:  - if no new needs will plan to graduate from care coordination .   Patient  is agreeable to this plan.

## 2025-04-07 ENCOUNTER — HOSPITAL ENCOUNTER (OUTPATIENT)
Age: 80
Discharge: HOME | End: 2025-04-07
Payer: MEDICARE

## 2025-04-07 DIAGNOSIS — E03.9 HYPOTHYROIDISM (ACQUIRED): ICD-10-CM

## 2025-04-07 DIAGNOSIS — R60.0 BILATERAL LOWER EXTREMITY EDEMA: ICD-10-CM

## 2025-04-07 DIAGNOSIS — E78.2 MIXED HYPERLIPIDEMIA: ICD-10-CM

## 2025-04-07 DIAGNOSIS — R73.01 IMPAIRED FASTING GLUCOSE: ICD-10-CM

## 2025-04-07 DIAGNOSIS — D64.9 ANEMIA, UNSPECIFIED TYPE: ICD-10-CM

## 2025-04-07 DIAGNOSIS — R06.02 SOB (SHORTNESS OF BREATH): ICD-10-CM

## 2025-04-07 LAB
ALT SERPL-CCNC: 15 U/L (ref 10–35)
ANION GAP SERPL CALCULATED.3IONS-SCNC: 10 MMOL/L (ref 9–16)
AST SERPL-CCNC: 23 U/L (ref 10–35)
BNP SERPL-MCNC: 203 PG/ML (ref 0–450)
BUN SERPL-MCNC: 11 MG/DL (ref 8–23)
BUN/CREAT SERPL: 18 (ref 9–20)
CALCIUM SERPL-MCNC: 9.3 MG/DL (ref 8.6–10.4)
CHLORIDE SERPL-SCNC: 103 MMOL/L (ref 98–107)
CHOLEST SERPL-MCNC: 132 MG/DL (ref 0–199)
CHOLESTEROL/HDL RATIO: 1.8
CO2 SERPL-SCNC: 29 MMOL/L (ref 20–31)
CREAT SERPL-MCNC: 0.6 MG/DL (ref 0.5–0.9)
ERYTHROCYTE [DISTWIDTH] IN BLOOD BY AUTOMATED COUNT: 13.8 % (ref 11.8–14.4)
EST. AVERAGE GLUCOSE BLD GHB EST-MCNC: 97 MG/DL
FERRITIN SERPL-MCNC: 76 NG/ML
GFR, ESTIMATED: >90 ML/MIN/1.73M2
GLUCOSE SERPL-MCNC: 97 MG/DL (ref 74–99)
HBA1C MFR BLD: 5 % (ref 4–6)
HCT VFR BLD AUTO: 37.1 % (ref 36.3–47.1)
HDLC SERPL-MCNC: 72 MG/DL
HGB BLD-MCNC: 11.6 G/DL (ref 11.9–15.1)
IRON SERPL-MCNC: 29 UG/DL (ref 37–145)
LDLC SERPL CALC-MCNC: 50 MG/DL (ref 0–100)
MCH RBC QN AUTO: 29.9 PG (ref 25.2–33.5)
MCHC RBC AUTO-ENTMCNC: 31.3 G/DL (ref 28.4–34.8)
MCV RBC AUTO: 95.6 FL (ref 82.6–102.9)
NRBC BLD-RTO: 0 PER 100 WBC
PLATELET # BLD AUTO: 241 K/UL (ref 138–453)
PMV BLD AUTO: 9.8 FL (ref 8.1–13.5)
POTASSIUM SERPL-SCNC: 3.9 MMOL/L (ref 3.7–5.3)
RBC # BLD AUTO: 3.88 M/UL (ref 3.95–5.11)
SODIUM SERPL-SCNC: 142 MMOL/L (ref 136–145)
T3FREE SERPL-MCNC: 3.06 PG/ML (ref 2–4.4)
T4 FREE SERPL-MCNC: 0.9 NG/DL (ref 0.9–1.7)
TRIGL SERPL-MCNC: 51 MG/DL
TSH SERPL DL<=0.05 MIU/L-ACNC: 0.55 UIU/ML (ref 0.27–4.2)
VLDLC SERPL CALC-MCNC: 10 MG/DL (ref 1–30)
WBC OTHER # BLD: 4.6 K/UL (ref 3.5–11.3)

## 2025-04-07 PROCEDURE — 84450 TRANSFERASE (AST) (SGOT): CPT

## 2025-04-07 PROCEDURE — 84439 ASSAY OF FREE THYROXINE: CPT

## 2025-04-07 PROCEDURE — 83036 HEMOGLOBIN GLYCOSYLATED A1C: CPT

## 2025-04-07 PROCEDURE — 80048 BASIC METABOLIC PNL TOTAL CA: CPT

## 2025-04-07 PROCEDURE — 84481 FREE ASSAY (FT-3): CPT

## 2025-04-07 PROCEDURE — 84443 ASSAY THYROID STIM HORMONE: CPT

## 2025-04-07 PROCEDURE — 82728 ASSAY OF FERRITIN: CPT

## 2025-04-07 PROCEDURE — 84460 ALANINE AMINO (ALT) (SGPT): CPT

## 2025-04-07 PROCEDURE — 36415 COLL VENOUS BLD VENIPUNCTURE: CPT

## 2025-04-07 PROCEDURE — 83540 ASSAY OF IRON: CPT

## 2025-04-07 PROCEDURE — 85027 COMPLETE CBC AUTOMATED: CPT

## 2025-04-07 PROCEDURE — 80061 LIPID PANEL: CPT

## 2025-04-07 PROCEDURE — 83880 ASSAY OF NATRIURETIC PEPTIDE: CPT

## 2025-04-07 RX ORDER — ATORVASTATIN CALCIUM 40 MG/1
40 TABLET, FILM COATED ORAL NIGHTLY
Qty: 90 TABLET | Refills: 3 | Status: SHIPPED | OUTPATIENT
Start: 2025-04-07

## 2025-04-07 NOTE — TELEPHONE ENCOUNTER
Toshia South is calling to request a refill on the following medication(s):    Medication Request:  Requested Prescriptions     Pending Prescriptions Disp Refills    atorvastatin (LIPITOR) 40 MG tablet [Pharmacy Med Name: ATORVASTATIN 40 MG TABLET] 30 tablet 3     Sig: TAKE ONE TABLET BY MOUTH ONCE NIGHTLY       Last Visit Date (If Applicable):  Visit date not found    Next Visit Date:    Visit date not found

## 2025-04-16 ENCOUNTER — HOSPITAL ENCOUNTER (OUTPATIENT)
Dept: PHYSICAL THERAPY | Age: 80
Setting detail: THERAPIES SERIES
Discharge: HOME | End: 2025-04-16

## 2025-04-16 ENCOUNTER — TELEPHONE (OUTPATIENT)
Dept: CARDIOLOGY | Age: 80
End: 2025-04-16

## 2025-04-16 NOTE — TELEPHONE ENCOUNTER
Varsha from [facility] called, she states that patient was referred to rehab from Dr. Moraes for possible lymphedema. She wants to make sure it is safe to do compression pumps with her history of heart failure. Please advise. Thank you

## 2025-04-17 NOTE — TELEPHONE ENCOUNTER
Spoke with Varsha and let her know these are okay to use with lower pitting edema. She verbalized understanding.

## 2025-04-17 NOTE — TELEPHONE ENCOUNTER
Varsha called back and I relayed this information - she reports the patient is having lower pitting edema and wanted to make sure these compressions are safe to use. Please advise.

## 2025-04-21 ENCOUNTER — CARE COORDINATION (OUTPATIENT)
Dept: CARE COORDINATION | Age: 80
End: 2025-04-21

## 2025-04-21 NOTE — CARE COORDINATION
Ambulatory Care Coordination Note     2025 1:18 PM     Patient Current Location:  Home: [address] [address]     ACM contacted the patient by telephone. Verified name and  with patient as identifiers.     Patient graduated from the High Risk Care Management program on 2025.  Patient verbalizes confidence in the ability to self-manage at this time..  Care management goals have been completed. No further Ambulatory Care Manager follow up scheduled.      ACM: Delphine Gao RN     Challenges to be reviewed by the provider   Additional needs identified to be addressed with provider No  none               Method of communication with provider: none.    Utilization: Patient has not had any utilization since our last call.       Medications Reviewed:   Completed during this call    Advance Care Planning:   Patient declined education       PCP/Specialist follow up:   Future Appointments         Provider Specialty Dept Phone    2025 11:00 AM Jong Moraes MD Internal Medicine [phone]    2025 2:00 PM Boy Dennis MD Pulmonology [phone]    2025 10:40 AM Jose Miner MD Cardiology [phone]            Follow Up:   No further Ambulatory Care Management follow-up scheduled at this time.  Patient  has Ambulatory Care Manager's contact information for any further questions, concerns or needs.

## 2025-06-24 ENCOUNTER — HOSPITAL ENCOUNTER (OUTPATIENT)
Dept: WOUND CARE | Age: 80
Discharge: HOME | End: 2025-06-24
Payer: MEDICARE

## 2025-06-24 VITALS
BODY MASS INDEX: 22.49 KG/M2 | WEIGHT: 135 LBS | DIASTOLIC BLOOD PRESSURE: 107 MMHG | RESPIRATION RATE: 20 BRPM | HEART RATE: 91 BPM | TEMPERATURE: 98.3 F | SYSTOLIC BLOOD PRESSURE: 139 MMHG | HEIGHT: 65 IN

## 2025-06-24 DIAGNOSIS — I70.229 ATHEROSCLEROTIC PERIPHERAL VASCULAR DISEASE WITH REST PAIN (HCC): ICD-10-CM

## 2025-06-24 DIAGNOSIS — L97.822 ULCER OF LEFT PRETIBIAL REGION WITH FAT LAYER EXPOSED (HCC): Primary | ICD-10-CM

## 2025-06-24 DIAGNOSIS — I70.223: ICD-10-CM

## 2025-06-24 PROCEDURE — 87106 FUNGI IDENTIFICATION YEAST: CPT

## 2025-06-24 PROCEDURE — 99204 OFFICE O/P NEW MOD 45 MIN: CPT

## 2025-06-24 PROCEDURE — 87205 SMEAR GRAM STAIN: CPT

## 2025-06-24 PROCEDURE — 87070 CULTURE OTHR SPECIMN AEROBIC: CPT

## 2025-06-24 RX ORDER — SILVER SULFADIAZINE 10 MG/G
CREAM TOPICAL
Qty: 50 G | Refills: 2 | Status: SHIPPED | OUTPATIENT
Start: 2025-06-24 | End: 2025-07-15

## 2025-06-24 NOTE — DISCHARGE INSTRUCTIONS
Wound Management Patient Discharge Instructions    CALL [phone] for questions regarding care of your wounds.  USUAL OFFICE HOURS ARE TUESDAYS MORNINGS AND THURSDAYS(9-2:30) and subject to change without notice     PLEASE ARRIVE PRIOR TO APPOINTMENT TIME TO COMPLETE REGISTRATION PROCESS        Discharge instructions for the patient's plan of care.    Wound care: Right great toe, left great toe, left lower leg  Apply silvadene to areas cover with dry dressing change daily.  Can also apply to 4th toe.  Wear toe pad between 3rd and 4th toe.  Culture was done today will call if needing antibiotic.   silvadene cream at [facility]    Vascular studies to be done prior to next visit on 7/10/25  Call to schedule at [phone]    Return to clinic Thursday 7/10/25 at 9:00 AM  Nutrition Therapy Recommendations-   Utilize protein foods all meals and snacks to aid healing.  If losing weight unintentionally, try a nutrition drink like Ensure, Boost, Premier Protein at least once daily    Next appointment:  Future Appointments   Date Time Provider Department Center   6/30/2025 10:30 AM Jong Moraes MD [facility]    7/10/2025  9:00 AM Ricky Hendrickson DPM [facility] [place]   7/21/2025  2:00 PM [facility]   7/24/2025 10:40 AM Jose Mnier MD [facility]   11/24/2025 10:00 AM Jong Moraes MD [facility]    5/19/2026  9:30 AM Jong Moraes MD [facility]        Your wound care supplies were ordered from [facility], unless otherwise stated.  If you do not receive them in 3 days call them at [phone].      SURVEY:    You may be receiving a survey from Daniela Brooks regarding your visit today.    Please complete the survey to enable us to provide the highest quality of care to you and your family.    If you cannot score us a very good on any question, please call the office to discuss how we could have made your experience a very

## 2025-06-24 NOTE — PROGRESS NOTES
Wound Management Medical Nutrition Therapy Assessment - Chart Review     Age- 79 y.o.  Sex- female      Chief Complaint- Wound Check (Right great toe, left great toe, left lower leg)    Height- Height: 165.1 cm (5' 5\") (historical data)  Weight- Weight - Scale: 61.2 kg (135 lb)    IBW- 125# %IBW- 108%  Body mass index is 22.47 kg/m². - Normal    UBW- 140.8# %UBW- 95.8%      Wt Readings from Last 10 Encounters:   06/24/25 61.2 kg (135 lb)   06/10/25 63.9 kg (140 lb 12.8 oz)   05/13/25 63 kg (139 lb)   04/07/25 69.5 kg (153 lb 3.2 oz)   02/10/25 68.2 kg (150 lb 6.4 oz)   01/29/25 67.4 kg (148 lb 9.6 oz)   01/24/25 63.5 kg (140 lb)   01/23/25 63.5 kg (140 lb)   01/20/25 63.5 kg (140 lb)   01/20/25 63.5 kg (140 lb)     Significant Weight Change- No   Unintentional- unknown    Estimated Needs-  BEE- 1183 kcal      Total Calorie Needs- 6721-6656 (25-30 kcal/kg)     Total Protein Needs- 73-86 (1.2-1.4 g/kg)    Patient       Diagnosis Date    A-fib ([facility])     Anxiety     Atrial fibrillation (HCC)     Biventricular congestive heart failure (HCC)     Bronchiectasis with acute exacerbation ([facility]) 04/29/2022    CAD (coronary artery disease)     Chronic pain syndrome     dilaudid infusion pump    COPD (chronic obstructive pulmonary disease) (HCC)     Depression     GERD (gastroesophageal reflux disease)     Hypothyroidism     Impaired fasting glucose     Iron deficiency anemia     Osteoporosis     Pulmonary fibrosis (HCC) 04/29/2022    Sleep apnea     Subdural hematoma ([facility]) 08/23/2022       Patient Aspirin, DULoxetine, HYDROcodone-acetaminophen, NONFORMULARY, OXYGEN, albuterol sulfate HFA, alendronate, atorvastatin, buPROPion, bumetanide, calcium citrate-vitamin D, citalopram, digoxin, dilTIAZem, guaiFENesin, ipratropium 0.5 mg-albuterol 2.5 mg, levothyroxine, potassium chloride, pregabalin, rivaroxaban, spironolactone, therapeutic multivitamin-minerals, tiZANidine, traZODone, umeclidinium-vilanterol, and vitamin

## 2025-06-24 NOTE — PLAN OF CARE
Problem: Cognitive:  Goal: Understands risk factors for wounds  Description: Understands risk factors for wounds  Outcome: Progressing     Problem: Wound:  Goal: Will show signs of wound healing; wound closure and no evidence of infection  Description: Will show signs of wound healing; wound closure and no evidence of infection  Outcome: Progressing     Problem: Arterial:  Goal: Optimize blood flow for wound healing  Description: Optimize blood flow for wound healing  Outcome: Progressing     Problem: Venous:  Goal: Signs of wound healing will improve  Description: Signs of wound healing will improve  Outcome: Progressing     Problem: Falls - Risk of:  Goal: Will remain free from falls  Description: Will remain free from falls  Outcome: Progressing

## 2025-06-24 NOTE — PROGRESS NOTES
Subjective      Toshia South is a 79 y.o. female who presents for initial evaluation & treatment.  She  has  Wound(s) which are/is located on the  {WOUND LOWER EXTREMITIES WOUND LOC:15091}.            PAST MEDICAL HISTORY     has a past medical history of A-fib (HCC), Anxiety, Atrial fibrillation (HCC), Biventricular congestive heart failure (HCC), Bronchiectasis with acute exacerbation (HCC), CAD (coronary artery disease), Chronic pain syndrome, COPD (chronic obstructive pulmonary disease) (HCC), Depression, GERD (gastroesophageal reflux disease), Hypothyroidism, Impaired fasting glucose, Iron deficiency anemia, Osteoporosis, Pulmonary fibrosis (HCC), Sleep apnea, and Subdural hematoma ([facility]).    MEDICATIONS    Current Outpatient Medications   Medication Sig Dispense Refill    silver sulfADIAZINE (SSD) 1 % cream Apply topically daily. 50 g 2    buPROPion (WELLBUTRIN XL) 300 MG extended release tablet Take 1 tablet by mouth every morning 90 tablet 3    citalopram (CELEXA) 40 MG tablet Take 1 tablet by mouth daily 90 tablet 3    atorvastatin (LIPITOR) 40 MG tablet TAKE ONE TABLET BY MOUTH ONCE NIGHTLY 90 tablet 3    VENTOLIN  (90 Base) MCG/ACT inhaler USE 2 INHALATIONS BY MOUTH INTO  THE LUNGS 4 TIMES DAILY AS  NEEDED FOR WHEEZING 72 g 3    bumetanide (BUMEX) 2 MG tablet Take 1 tablet by mouth 2 times daily 60 tablet 5    spironolactone (ALDACTONE) 50 MG tablet Take 1 tablet by mouth daily 30 tablet 5    HYDROcodone-acetaminophen (NORCO)  MG per tablet Take 1 tablet by mouth every 6 hours as needed.      DULoxetine (CYMBALTA) 60 MG extended release capsule TAKE 1 CAPSULE BY MOUTH DAILY 90 capsule 3    XARELTO 20 MG TABS tablet TAKE 1 TABLET BY MOUTH DAILY  WITH BREAKFAST 90 tablet 3    alendronate (FOSAMAX) 70 MG tablet TAKE ON SUNDAY 1 TABLET BY MOUTH WEEKLY WITH 8 OZ OF PLAIN WATER  30 MINUTES BEFORE FIRST FOOD,  DRINK OR MEDS. STAY UPRIGHT FOR  30 MINS 12 tablet 3    dilTIAZem (CARDIZEM CD) 120

## 2025-06-25 ENCOUNTER — APPOINTMENT (OUTPATIENT)
Dept: GENERAL RADIOLOGY | Age: 80
End: 2025-06-25
Payer: MEDICARE

## 2025-06-25 ENCOUNTER — APPOINTMENT (OUTPATIENT)
Dept: CT IMAGING | Age: 80
End: 2025-06-25
Payer: MEDICARE

## 2025-06-25 ENCOUNTER — ANESTHESIA EVENT (OUTPATIENT)
Dept: OPERATING ROOM | Age: 80
End: 2025-06-25
Payer: MEDICARE

## 2025-06-25 ENCOUNTER — HOSPITAL ENCOUNTER (EMERGENCY)
Age: 80
Discharge: ANOTHER HOSPITAL | End: 2025-06-25
Attending: EMERGENCY MEDICINE
Payer: MEDICARE

## 2025-06-25 ENCOUNTER — ANESTHESIA (OUTPATIENT)
Dept: OPERATING ROOM | Age: 80
End: 2025-06-25
Payer: MEDICARE

## 2025-06-25 ENCOUNTER — HOSPITAL ENCOUNTER (INPATIENT)
Age: 80
LOS: 3 days | Discharge: HOME | End: 2025-06-28
Attending: EMERGENCY MEDICINE | Admitting: INTERNAL MEDICINE
Payer: MEDICARE

## 2025-06-25 VITALS
DIASTOLIC BLOOD PRESSURE: 83 MMHG | TEMPERATURE: 98.1 F | OXYGEN SATURATION: 97 % | RESPIRATION RATE: 16 BRPM | HEART RATE: 87 BPM | SYSTOLIC BLOOD PRESSURE: 160 MMHG

## 2025-06-25 DIAGNOSIS — S09.90XA MILD CLOSED HEAD INJURY, INITIAL ENCOUNTER: ICD-10-CM

## 2025-06-25 DIAGNOSIS — K56.2 SIGMOID VOLVULUS (HCC): Primary | ICD-10-CM

## 2025-06-25 DIAGNOSIS — R10.30 LOWER ABDOMINAL PAIN: Primary | ICD-10-CM

## 2025-06-25 PROBLEM — R10.9 ABDOMINAL PAIN: Status: ACTIVE | Noted: 2025-06-25

## 2025-06-25 PROBLEM — K63.89 COLON DISTENTION: Status: ACTIVE | Noted: 2025-06-25

## 2025-06-25 LAB
AMORPH SED URNS QL MICRO: ABNORMAL
ANION GAP SERPL CALCULATED.3IONS-SCNC: 9 MMOL/L (ref 9–16)
BASOPHILS # BLD: 0.03 K/UL (ref 0–0.2)
BASOPHILS NFR BLD: 1 % (ref 0–2)
BILIRUB UR QL STRIP: NEGATIVE
BUN SERPL-MCNC: 15 MG/DL (ref 8–23)
BUN/CREAT SERPL: 21 (ref 9–20)
CALCIUM SERPL-MCNC: 9.4 MG/DL (ref 8.6–10.4)
CHLORIDE SERPL-SCNC: 98 MMOL/L (ref 98–107)
CLARITY UR: CLEAR
CO2 SERPL-SCNC: 30 MMOL/L (ref 20–31)
COLOR UR: YELLOW
CREAT SERPL-MCNC: 0.7 MG/DL (ref 0.5–0.9)
EOSINOPHIL # BLD: 0.17 K/UL (ref 0–0.44)
EOSINOPHILS RELATIVE PERCENT: 3 % (ref 1–4)
EPI CELLS #/AREA URNS HPF: ABNORMAL /HPF (ref 0–25)
ERYTHROCYTE [DISTWIDTH] IN BLOOD BY AUTOMATED COUNT: 14 % (ref 11.8–14.4)
GFR, ESTIMATED: 83 ML/MIN/1.73M2
GLUCOSE SERPL-MCNC: 98 MG/DL (ref 74–99)
GLUCOSE UR STRIP-MCNC: NEGATIVE MG/DL
HCT VFR BLD AUTO: 38.5 % (ref 36.3–47.1)
HGB BLD-MCNC: 12.7 G/DL (ref 11.9–15.1)
HGB UR QL STRIP.AUTO: NEGATIVE
IMM GRANULOCYTES # BLD AUTO: <0.03 K/UL (ref 0–0.3)
IMM GRANULOCYTES NFR BLD: 0 %
KETONES UR STRIP-MCNC: NEGATIVE MG/DL
LACTATE BLDV-SCNC: 0.8 MMOL/L (ref 0.5–2.2)
LACTIC ACID, WHOLE BLOOD: 0.9 MMOL/L (ref 0.7–2.1)
LEUKOCYTE ESTERASE UR QL STRIP: NEGATIVE
LYMPHOCYTES NFR BLD: 1.16 K/UL (ref 1.1–3.7)
LYMPHOCYTES RELATIVE PERCENT: 21 % (ref 24–43)
MCH RBC QN AUTO: 30.4 PG (ref 25.2–33.5)
MCHC RBC AUTO-ENTMCNC: 33 G/DL (ref 28.4–34.8)
MCV RBC AUTO: 92.1 FL (ref 82.6–102.9)
MONOCYTES NFR BLD: 0.33 K/UL (ref 0.1–1.2)
MONOCYTES NFR BLD: 6 % (ref 3–12)
NEUTROPHILS NFR BLD: 69 % (ref 36–65)
NEUTS SEG NFR BLD: 3.83 K/UL (ref 1.5–8.1)
NITRITE UR QL STRIP: NEGATIVE
NRBC BLD-RTO: 0 PER 100 WBC
PH UR STRIP: 7 [PH] (ref 5–9)
PLATELET # BLD AUTO: 234 K/UL (ref 138–453)
PMV BLD AUTO: 9.6 FL (ref 8.1–13.5)
POTASSIUM SERPL-SCNC: 3.8 MMOL/L (ref 3.7–5.3)
PROT UR STRIP-MCNC: NEGATIVE MG/DL
RBC # BLD AUTO: 4.18 M/UL (ref 3.95–5.11)
RBC #/AREA URNS HPF: ABNORMAL /HPF (ref 0–2)
SODIUM SERPL-SCNC: 137 MMOL/L (ref 136–145)
SP GR UR STRIP: <1.005 (ref 1.01–1.02)
UROBILINOGEN UR STRIP-ACNC: NORMAL EU/DL (ref 0–1)
WBC #/AREA URNS HPF: ABNORMAL /HPF (ref 0–5)
WBC OTHER # BLD: 5.5 K/UL (ref 3.5–11.3)

## 2025-06-25 PROCEDURE — 0D9N8ZZ DRAINAGE OF SIGMOID COLON, VIA NATURAL OR ARTIFICIAL OPENING ENDOSCOPIC: ICD-10-PCS | Performed by: STUDENT IN AN ORGANIZED HEALTH CARE EDUCATION/TRAINING PROGRAM

## 2025-06-25 PROCEDURE — 6360000002 HC RX W HCPCS: Performed by: EMERGENCY MEDICINE

## 2025-06-25 PROCEDURE — 80048 BASIC METABOLIC PNL TOTAL CA: CPT

## 2025-06-25 PROCEDURE — 83605 ASSAY OF LACTIC ACID: CPT

## 2025-06-25 PROCEDURE — 3700000000 HC ANESTHESIA ATTENDED CARE: Performed by: STUDENT IN AN ORGANIZED HEALTH CARE EDUCATION/TRAINING PROGRAM

## 2025-06-25 PROCEDURE — 2500000003 HC RX 250 WO HCPCS

## 2025-06-25 PROCEDURE — 74018 RADEX ABDOMEN 1 VIEW: CPT

## 2025-06-25 PROCEDURE — 2580000003 HC RX 258: Performed by: INTERNAL MEDICINE

## 2025-06-25 PROCEDURE — 2500000003 HC RX 250 WO HCPCS: Performed by: ANESTHESIOLOGY

## 2025-06-25 PROCEDURE — 99223 1ST HOSP IP/OBS HIGH 75: CPT | Performed by: STUDENT IN AN ORGANIZED HEALTH CARE EDUCATION/TRAINING PROGRAM

## 2025-06-25 PROCEDURE — 6360000004 HC RX CONTRAST MEDICATION: Performed by: EMERGENCY MEDICINE

## 2025-06-25 PROCEDURE — 96374 THER/PROPH/DIAG INJ IV PUSH: CPT

## 2025-06-25 PROCEDURE — 85025 COMPLETE CBC W/AUTO DIFF WBC: CPT

## 2025-06-25 PROCEDURE — 70450 CT HEAD/BRAIN W/O DYE: CPT

## 2025-06-25 PROCEDURE — 99285 EMERGENCY DEPT VISIT HI MDM: CPT

## 2025-06-25 PROCEDURE — 6360000002 HC RX W HCPCS

## 2025-06-25 PROCEDURE — 81001 URINALYSIS AUTO W/SCOPE: CPT

## 2025-06-25 PROCEDURE — 2709999900 HC NON-CHARGEABLE SUPPLY: Performed by: STUDENT IN AN ORGANIZED HEALTH CARE EDUCATION/TRAINING PROGRAM

## 2025-06-25 PROCEDURE — 3609155600 HC COLONOSCOPY WITH DECOMPRESSION: Performed by: STUDENT IN AN ORGANIZED HEALTH CARE EDUCATION/TRAINING PROGRAM

## 2025-06-25 PROCEDURE — 96375 TX/PRO/DX INJ NEW DRUG ADDON: CPT

## 2025-06-25 PROCEDURE — 2060000000 HC ICU INTERMEDIATE R&B

## 2025-06-25 PROCEDURE — 2580000003 HC RX 258

## 2025-06-25 PROCEDURE — 73130 X-RAY EXAM OF HAND: CPT

## 2025-06-25 PROCEDURE — 74177 CT ABD & PELVIS W/CONTRAST: CPT

## 2025-06-25 PROCEDURE — C1729 CATH, DRAINAGE: HCPCS | Performed by: STUDENT IN AN ORGANIZED HEALTH CARE EDUCATION/TRAINING PROGRAM

## 2025-06-25 PROCEDURE — 7100000001 HC PACU RECOVERY - ADDTL 15 MIN: Performed by: STUDENT IN AN ORGANIZED HEALTH CARE EDUCATION/TRAINING PROGRAM

## 2025-06-25 PROCEDURE — 45337 SIGMOIDOSCOPY & DECOMPRESS: CPT | Performed by: STUDENT IN AN ORGANIZED HEALTH CARE EDUCATION/TRAINING PROGRAM

## 2025-06-25 PROCEDURE — 7100000000 HC PACU RECOVERY - FIRST 15 MIN: Performed by: STUDENT IN AN ORGANIZED HEALTH CARE EDUCATION/TRAINING PROGRAM

## 2025-06-25 PROCEDURE — 99222 1ST HOSP IP/OBS MODERATE 55: CPT | Performed by: SURGERY

## 2025-06-25 PROCEDURE — 0D7N8DZ DILATION OF SIGMOID COLON WITH INTRALUMINAL DEVICE, VIA NATURAL OR ARTIFICIAL OPENING ENDOSCOPIC: ICD-10-PCS | Performed by: STUDENT IN AN ORGANIZED HEALTH CARE EDUCATION/TRAINING PROGRAM

## 2025-06-25 PROCEDURE — 3700000001 HC ADD 15 MINUTES (ANESTHESIA): Performed by: STUDENT IN AN ORGANIZED HEALTH CARE EDUCATION/TRAINING PROGRAM

## 2025-06-25 RX ORDER — NALOXONE HYDROCHLORIDE 0.4 MG/ML
INJECTION, SOLUTION INTRAMUSCULAR; INTRAVENOUS; SUBCUTANEOUS PRN
Status: DISCONTINUED | OUTPATIENT
Start: 2025-06-25 | End: 2025-06-28 | Stop reason: HOSPADM

## 2025-06-25 RX ORDER — PROPOFOL 10 MG/ML
INJECTION, EMULSION INTRAVENOUS
Status: DISCONTINUED | OUTPATIENT
Start: 2025-06-25 | End: 2025-06-25 | Stop reason: SDUPTHER

## 2025-06-25 RX ORDER — ONDANSETRON 4 MG/1
4 TABLET, ORALLY DISINTEGRATING ORAL EVERY 8 HOURS PRN
Status: DISCONTINUED | OUTPATIENT
Start: 2025-06-25 | End: 2025-06-28 | Stop reason: HOSPADM

## 2025-06-25 RX ORDER — BUMETANIDE 1 MG/1
2 TABLET ORAL 2 TIMES DAILY
Status: DISCONTINUED | OUTPATIENT
Start: 2025-06-26 | End: 2025-06-28 | Stop reason: HOSPADM

## 2025-06-25 RX ORDER — DEXAMETHASONE SODIUM PHOSPHATE 10 MG/ML
INJECTION, SOLUTION INTRA-ARTICULAR; INTRALESIONAL; INTRAMUSCULAR; INTRAVENOUS; SOFT TISSUE
Status: DISCONTINUED | OUTPATIENT
Start: 2025-06-25 | End: 2025-06-25 | Stop reason: SDUPTHER

## 2025-06-25 RX ORDER — ACETAMINOPHEN 500 MG
1000 TABLET ORAL ONCE
Status: DISCONTINUED | OUTPATIENT
Start: 2025-06-25 | End: 2025-06-25

## 2025-06-25 RX ORDER — FENTANYL CITRATE 50 UG/ML
25 INJECTION, SOLUTION INTRAMUSCULAR; INTRAVENOUS EVERY 5 MIN PRN
Status: DISCONTINUED | OUTPATIENT
Start: 2025-06-25 | End: 2025-06-26

## 2025-06-25 RX ORDER — LIDOCAINE HYDROCHLORIDE 10 MG/ML
INJECTION, SOLUTION EPIDURAL; INFILTRATION; INTRACAUDAL; PERINEURAL
Status: DISCONTINUED | OUTPATIENT
Start: 2025-06-25 | End: 2025-06-25 | Stop reason: SDUPTHER

## 2025-06-25 RX ORDER — IOPAMIDOL 755 MG/ML
75 INJECTION, SOLUTION INTRAVASCULAR
Status: COMPLETED | OUTPATIENT
Start: 2025-06-25 | End: 2025-06-25

## 2025-06-25 RX ORDER — ACETAMINOPHEN 325 MG/1
650 TABLET ORAL EVERY 6 HOURS PRN
Status: DISCONTINUED | OUTPATIENT
Start: 2025-06-25 | End: 2025-06-26

## 2025-06-25 RX ORDER — ONDANSETRON 2 MG/ML
4 INJECTION INTRAMUSCULAR; INTRAVENOUS EVERY 6 HOURS PRN
Status: DISCONTINUED | OUTPATIENT
Start: 2025-06-25 | End: 2025-06-28 | Stop reason: HOSPADM

## 2025-06-25 RX ORDER — MAGNESIUM SULFATE 1 G/100ML
1000 INJECTION INTRAVENOUS PRN
Status: DISCONTINUED | OUTPATIENT
Start: 2025-06-25 | End: 2025-06-28 | Stop reason: HOSPADM

## 2025-06-25 RX ORDER — ALBUTEROL SULFATE 90 UG/1
2 INHALANT RESPIRATORY (INHALATION)
Status: DISCONTINUED | OUTPATIENT
Start: 2025-06-26 | End: 2025-06-26

## 2025-06-25 RX ORDER — POLYETHYLENE GLYCOL 3350 17 G/17G
17 POWDER, FOR SOLUTION ORAL DAILY PRN
Status: DISCONTINUED | OUTPATIENT
Start: 2025-06-25 | End: 2025-06-25

## 2025-06-25 RX ORDER — SODIUM CHLORIDE 0.9 % (FLUSH) 0.9 %
5-40 SYRINGE (ML) INJECTION PRN
Status: DISCONTINUED | OUTPATIENT
Start: 2025-06-25 | End: 2025-06-28 | Stop reason: HOSPADM

## 2025-06-25 RX ORDER — SODIUM CHLORIDE 0.9 % (FLUSH) 0.9 %
5-40 SYRINGE (ML) INJECTION EVERY 12 HOURS SCHEDULED
Status: DISCONTINUED | OUTPATIENT
Start: 2025-06-25 | End: 2025-06-28 | Stop reason: HOSPADM

## 2025-06-25 RX ORDER — DIPHENHYDRAMINE HYDROCHLORIDE 50 MG/ML
12.5 INJECTION, SOLUTION INTRAMUSCULAR; INTRAVENOUS
Status: DISCONTINUED | OUTPATIENT
Start: 2025-06-25 | End: 2025-06-28 | Stop reason: HOSPADM

## 2025-06-25 RX ORDER — SUCCINYLCHOLINE/SOD CL,ISO/PF 100 MG/5ML
SYRINGE (ML) INTRAVENOUS
Status: DISCONTINUED | OUTPATIENT
Start: 2025-06-25 | End: 2025-06-25 | Stop reason: SDUPTHER

## 2025-06-25 RX ORDER — SODIUM CHLORIDE 9 MG/ML
INJECTION, SOLUTION INTRAVENOUS CONTINUOUS
Status: ACTIVE | OUTPATIENT
Start: 2025-06-25 | End: 2025-06-26

## 2025-06-25 RX ORDER — PREGABALIN 100 MG/1
300 CAPSULE ORAL 2 TIMES DAILY
Status: DISCONTINUED | OUTPATIENT
Start: 2025-06-26 | End: 2025-06-28 | Stop reason: HOSPADM

## 2025-06-25 RX ORDER — POTASSIUM CHLORIDE 7.45 MG/ML
10 INJECTION INTRAVENOUS PRN
Status: DISCONTINUED | OUTPATIENT
Start: 2025-06-25 | End: 2025-06-28 | Stop reason: HOSPADM

## 2025-06-25 RX ORDER — POTASSIUM CHLORIDE 1500 MG/1
40 TABLET, EXTENDED RELEASE ORAL PRN
Status: DISCONTINUED | OUTPATIENT
Start: 2025-06-25 | End: 2025-06-28 | Stop reason: HOSPADM

## 2025-06-25 RX ORDER — ONDANSETRON 2 MG/ML
4 INJECTION INTRAMUSCULAR; INTRAVENOUS ONCE
Status: COMPLETED | OUTPATIENT
Start: 2025-06-25 | End: 2025-06-25

## 2025-06-25 RX ORDER — ACETAMINOPHEN 650 MG/1
650 SUPPOSITORY RECTAL EVERY 6 HOURS PRN
Status: DISCONTINUED | OUTPATIENT
Start: 2025-06-25 | End: 2025-06-26

## 2025-06-25 RX ORDER — M-VIT,TX,IRON,MINS/CALC/FOLIC 27MG-0.4MG
1 TABLET ORAL DAILY
Status: DISCONTINUED | OUTPATIENT
Start: 2025-06-26 | End: 2025-06-28 | Stop reason: HOSPADM

## 2025-06-25 RX ORDER — FENTANYL CITRATE 50 UG/ML
50 INJECTION, SOLUTION INTRAMUSCULAR; INTRAVENOUS EVERY 5 MIN PRN
Status: DISCONTINUED | OUTPATIENT
Start: 2025-06-25 | End: 2025-06-26

## 2025-06-25 RX ORDER — FENTANYL CITRATE 50 UG/ML
50 INJECTION, SOLUTION INTRAMUSCULAR; INTRAVENOUS ONCE
Status: COMPLETED | OUTPATIENT
Start: 2025-06-25 | End: 2025-06-25

## 2025-06-25 RX ORDER — IPRATROPIUM BROMIDE AND ALBUTEROL SULFATE 2.5; .5 MG/3ML; MG/3ML
1 SOLUTION RESPIRATORY (INHALATION)
Status: DISCONTINUED | OUTPATIENT
Start: 2025-06-26 | End: 2025-06-28 | Stop reason: HOSPADM

## 2025-06-25 RX ORDER — CITALOPRAM HYDROBROMIDE 20 MG/1
40 TABLET ORAL DAILY
Status: DISCONTINUED | OUTPATIENT
Start: 2025-06-26 | End: 2025-06-28 | Stop reason: HOSPADM

## 2025-06-25 RX ORDER — FENTANYL CITRATE 50 UG/ML
25 INJECTION, SOLUTION INTRAMUSCULAR; INTRAVENOUS ONCE
Refills: 0 | Status: COMPLETED | OUTPATIENT
Start: 2025-06-25 | End: 2025-06-25

## 2025-06-25 RX ORDER — SODIUM CHLORIDE 0.9 % (FLUSH) 0.9 %
10 SYRINGE (ML) INJECTION PRN
Status: DISCONTINUED | OUTPATIENT
Start: 2025-06-25 | End: 2025-06-28 | Stop reason: HOSPADM

## 2025-06-25 RX ORDER — ATORVASTATIN CALCIUM 40 MG/1
40 TABLET, FILM COATED ORAL NIGHTLY
Status: DISCONTINUED | OUTPATIENT
Start: 2025-06-26 | End: 2025-06-28 | Stop reason: HOSPADM

## 2025-06-25 RX ORDER — SODIUM CHLORIDE 9 MG/ML
INJECTION, SOLUTION INTRAVENOUS PRN
Status: DISCONTINUED | OUTPATIENT
Start: 2025-06-25 | End: 2025-06-28 | Stop reason: HOSPADM

## 2025-06-25 RX ORDER — SPIRONOLACTONE 25 MG/1
50 TABLET ORAL DAILY
Status: DISCONTINUED | OUTPATIENT
Start: 2025-06-26 | End: 2025-06-28 | Stop reason: HOSPADM

## 2025-06-25 RX ORDER — ONDANSETRON 2 MG/ML
INJECTION INTRAMUSCULAR; INTRAVENOUS
Status: DISCONTINUED | OUTPATIENT
Start: 2025-06-25 | End: 2025-06-25 | Stop reason: SDUPTHER

## 2025-06-25 RX ORDER — SODIUM CHLORIDE, SODIUM LACTATE, POTASSIUM CHLORIDE, CALCIUM CHLORIDE 600; 310; 30; 20 MG/100ML; MG/100ML; MG/100ML; MG/100ML
INJECTION, SOLUTION INTRAVENOUS
Status: DISCONTINUED | OUTPATIENT
Start: 2025-06-25 | End: 2025-06-25 | Stop reason: SDUPTHER

## 2025-06-25 RX ORDER — MORPHINE SULFATE 4 MG/ML
4 INJECTION INTRAVENOUS ONCE
Status: COMPLETED | OUTPATIENT
Start: 2025-06-25 | End: 2025-06-25

## 2025-06-25 RX ORDER — ONDANSETRON 2 MG/ML
4 INJECTION INTRAMUSCULAR; INTRAVENOUS
Status: DISCONTINUED | OUTPATIENT
Start: 2025-06-25 | End: 2025-06-28 | Stop reason: HOSPADM

## 2025-06-25 RX ORDER — POLYETHYLENE GLYCOL 3350 17 G/17G
17 POWDER, FOR SOLUTION ORAL DAILY
Status: DISCONTINUED | OUTPATIENT
Start: 2025-06-26 | End: 2025-06-27

## 2025-06-25 RX ORDER — BUPROPION HYDROCHLORIDE 150 MG/1
300 TABLET ORAL EVERY MORNING
Status: DISCONTINUED | OUTPATIENT
Start: 2025-06-26 | End: 2025-06-28 | Stop reason: HOSPADM

## 2025-06-25 RX ORDER — EPHEDRINE SULFATE/0.9% NACL/PF 25 MG/5 ML
SYRINGE (ML) INTRAVENOUS
Status: DISCONTINUED | OUTPATIENT
Start: 2025-06-25 | End: 2025-06-25 | Stop reason: SDUPTHER

## 2025-06-25 RX ADMIN — SODIUM CHLORIDE, PRESERVATIVE FREE 10 ML: 5 INJECTION INTRAVENOUS at 22:37

## 2025-06-25 RX ADMIN — FENTANYL CITRATE 25 MCG: 50 INJECTION INTRAMUSCULAR; INTRAVENOUS at 13:32

## 2025-06-25 RX ADMIN — SODIUM CHLORIDE, POTASSIUM CHLORIDE, SODIUM LACTATE AND CALCIUM CHLORIDE: 600; 310; 30; 20 INJECTION, SOLUTION INTRAVENOUS at 19:02

## 2025-06-25 RX ADMIN — EPHEDRINE SULFATE 10 MG: 5 INJECTION INTRAVENOUS at 19:27

## 2025-06-25 RX ADMIN — IOPAMIDOL 75 ML: 755 INJECTION, SOLUTION INTRAVENOUS at 10:33

## 2025-06-25 RX ADMIN — DEXAMETHASONE SODIUM PHOSPHATE 4 MG: 10 INJECTION INTRAMUSCULAR; INTRAVENOUS at 19:30

## 2025-06-25 RX ADMIN — EPHEDRINE SULFATE 5 MG: 5 INJECTION INTRAVENOUS at 19:21

## 2025-06-25 RX ADMIN — ONDANSETRON 4 MG: 2 INJECTION, SOLUTION INTRAMUSCULAR; INTRAVENOUS at 19:30

## 2025-06-25 RX ADMIN — Medication 100 MG: at 19:13

## 2025-06-25 RX ADMIN — SODIUM CHLORIDE: 0.9 INJECTION, SOLUTION INTRAVENOUS at 22:32

## 2025-06-25 RX ADMIN — EPHEDRINE SULFATE 10 MG: 5 INJECTION INTRAVENOUS at 19:24

## 2025-06-25 RX ADMIN — PROPOFOL 100 MG: 10 INJECTION, EMULSION INTRAVENOUS at 19:13

## 2025-06-25 RX ADMIN — MORPHINE SULFATE 4 MG: 4 INJECTION INTRAVENOUS at 17:46

## 2025-06-25 RX ADMIN — ONDANSETRON 4 MG: 2 INJECTION, SOLUTION INTRAMUSCULAR; INTRAVENOUS at 13:34

## 2025-06-25 RX ADMIN — FENTANYL CITRATE 50 MCG: 50 INJECTION INTRAMUSCULAR; INTRAVENOUS at 17:10

## 2025-06-25 RX ADMIN — LIDOCAINE HYDROCHLORIDE 30 MG: 10 INJECTION, SOLUTION EPIDURAL; INFILTRATION; INTRACAUDAL; PERINEURAL at 19:10

## 2025-06-25 ASSESSMENT — PAIN DESCRIPTION - LOCATION
LOCATION: SHOULDER;ARM;HEAD
LOCATION: ABDOMEN

## 2025-06-25 ASSESSMENT — PAIN - FUNCTIONAL ASSESSMENT
PAIN_FUNCTIONAL_ASSESSMENT: 0-10
PAIN_FUNCTIONAL_ASSESSMENT: 0-10
PAIN_FUNCTIONAL_ASSESSMENT: NONE - DENIES PAIN

## 2025-06-25 ASSESSMENT — PAIN DESCRIPTION - DESCRIPTORS
DESCRIPTORS: ACHING;BURNING
DESCRIPTORS: ACHING
DESCRIPTORS: ACHING
DESCRIPTORS: ACHING;BURNING

## 2025-06-25 ASSESSMENT — PAIN SCALES - GENERAL
PAINLEVEL_OUTOF10: 8
PAINLEVEL_OUTOF10: 4
PAINLEVEL_OUTOF10: 7
PAINLEVEL_OUTOF10: 8

## 2025-06-25 ASSESSMENT — ENCOUNTER SYMPTOMS
ABDOMINAL DISTENTION: 0
ABDOMINAL PAIN: 1
DIARRHEA: 0
VOMITING: 0
DIARRHEA: 0
SHORTNESS OF BREATH: 0
VOMITING: 0
NAUSEA: 0
CONSTIPATION: 1
NAUSEA: 0
SORE THROAT: 0
BACK PAIN: 0

## 2025-06-25 ASSESSMENT — PAIN DESCRIPTION - ORIENTATION
ORIENTATION: LEFT
ORIENTATION: LOWER

## 2025-06-25 ASSESSMENT — COPD QUESTIONNAIRES: CAT_SEVERITY: SEVERE

## 2025-06-25 ASSESSMENT — LIFESTYLE VARIABLES: SMOKING_STATUS: 0

## 2025-06-25 NOTE — ED TRIAGE NOTES
78 yo female arrived to ED via [facility] from [place].  Patient had a fall today tripping over oxygen tubing and landing on left side hitting head.  Patient denies any LOC.  Patient takes Xarelto.  Patient also having c/o abdominal pain with constipation over the last week.  Patient had CT scan which showed Sigmoid Volvulus and was transferred to [facility] for Barium CT study.  Patient is alert and oriented times 4, speaking full sentences, and answering questions appropriately

## 2025-06-25 NOTE — ANESTHESIA PRE PROCEDURE
Past Medical History:        Diagnosis Date   • A-fib ([facility])    • Anxiety    • Atrial fibrillation (HCC)    • Biventricular congestive heart failure (HCC)    • Bronchiectasis with acute exacerbation ([facility]) 04/29/2022   • CAD (coronary artery disease)    • Chronic pain syndrome     dilaudid infusion pump   • COPD (chronic obstructive pulmonary disease) (HCC)    • Depression    • GERD (gastroesophageal reflux disease)    • Hypothyroidism    • Impaired fasting glucose    • Iron deficiency anemia    • Osteoporosis    • Pulmonary fibrosis ([facility]) 04/29/2022   • Sleep apnea    • Subdural hematoma ([facility]) 08/23/2022       Past Surgical History:        Procedure Laterality Date   • BACK SURGERY  09/09/1998    spinal cord stimulator   • BACK SURGERY  08/04/1999    infusion pump insertion   • BACK SURGERY  10/23/2003    spinal cord stimulator removed   • BACK SURGERY  10/23/2003    new infusion pump placed   • BACK SURGERY  09/11/2017    replaced infusion pump   • CARPAL TUNNEL RELEASE Right 12/17/1999   • CARPAL TUNNEL RELEASE Left 11/24/1999   • CARPAL TUNNEL RELEASE Right 12/30/2002   • CARPAL TUNNEL RELEASE Left 12/17/2003   • CERVICAL DISC SURGERY  10/25/2004    anterior cervical diskectomy C7-T1   • CERVICAL DISC SURGERY  12/22/2004    anterior cervical discectomy C6-7 (complicated by damaged nerve to vocal cord)   • CRANIOTOMY Left 08/23/2022    LEFT CRANIOTOMY FOR SUBDURAL HEMATOMA EVACUATION performed by Yessica Lewis DO at [facility] OR   • FRACTURE SURGERY Left 12/20/2018    ORIF wrist   • HUMERUS FRACTURE SURGERY Right 10/03/2010   • HYSTERECTOMY (CERVIX STATUS UNKNOWN)  08/20/1991   • KNEE ARTHROSCOPY Right 06/02/2011   • KNEE SURGERY Right 02/04/2016    repair distal portion of knee arthroplasty due to prosthesis loosening   • LUMBAR DISC SURGERY  02/15/1994    due to scar tissue from previous surgery   • LUMBAR DISCECTOMY  08/30/1993    L5-S1   • LUMBAR LAMINECTOMY  06/09/2008    L3-4-5   • NECK SURGERY  05/03/2002

## 2025-06-25 NOTE — ED PROVIDER NOTES
the upper abdomen and mildly   distended; definite twisting/or volvulus not identified, but there is a large   amount of retained stool in proximal sigmoid and the remainder of more   proximal small bowel to the cecum. In addition, some soft tissue stranding in   the sigmoid mesentery noted, in the pelvis. Findings are concerning for   intermittent/partial sigmoid volvulus.  Consider Gastrografin BE which may be   diagnostic and therapeutic.   2. No free air or significant pathologic free fluid.   3. Moderate-large hiatus hernia again noted.   4. Probable small layering gallstones but no CT evidence cholecystitis.         CT Head W/O Contrast   Final Result   1. No acute intracranial abnormality.   2. Similar periventricular low-attenuation white matter abnormalities and   mild generalized cortical cerebral volume loss.   3. Minimal mucosal thickening left frontal and right sphenoid sinuses.               ED BEDSIDE ULTRASOUND:   Performed by ED Physician - none    LABS:  Labs Reviewed   CBC WITH AUTO DIFFERENTIAL - Abnormal; Notable for the following components:       Result Value    Neutrophils % 69 (*)     Lymphocytes % 21 (*)     All other components within normal limits   BASIC METABOLIC PANEL - Abnormal; Notable for the following components:    BUN/Creatinine Ratio 21 (*)     All other components within normal limits   URINALYSIS WITH REFLEX TO CULTURE - Abnormal; Notable for the following components:    Specific Gravity, UA <1.005 (*)     All other components within normal limits   MICROSCOPIC URINALYSIS - Abnormal; Notable for the following components:    Amorphous, UA TRACE (*)     All other components within normal limits   LACTIC ACID       All other labs were within normal range or not returned as of this dictation.    EMERGENCY DEPARTMENT COURSE and DIFFERENTIAL DIAGNOSIS/MDM:   Vitals:    Vitals:    06/25/25 1146 06/25/25 1315 06/25/25 1317 06/25/25 1327   BP: (!) 133/90 (!) 180/85 (!) 175/85 (!)

## 2025-06-25 NOTE — ED NOTES
ED to inpatient nurses report      Chief Complaint:  Chief Complaint   Patient presents with    Abdominal Pain     Present to ED from: [facility]    MOA:     LOC: alert and orientated to name, place, date  Mobility: Requires assistance * 1  Oxygen Baseline: 4L nasal cannula     Current needs required: 4L nasal cannula    Pending ED orders: Internal med and GI consult, pending lactic   Present condition: stable     Why did the patient come to the ED? S/p fall and hit head while on Xarelto. Abdominal pain with constipation with new finding of Sigmoid Vulvus.   What is the plan? Possible OR  Any procedures or intervention occur?    Any safety concerns??    Mental Status:  Level of Consciousness: Alert (0)    Psych Assessment:   Psychosocial  Psychosocial (WDL): Within Defined Limits  Vital signs   Vitals:    06/25/25 1635 06/25/25 1638 06/25/25 1704 06/25/25 1719   BP:  (!) 155/82 (!) 150/79 (!) 149/79   Pulse: 78  69 74   Resp: 16  12 15   Temp: 98.2 °F (36.8 °C)      TempSrc: Oral      SpO2: 94%  100% 100%        Vitals:  Patient Vitals for the past 24 hrs:   BP Temp Temp src Pulse Resp SpO2   06/25/25 1719 (!) 149/79 -- -- 74 15 100 %   06/25/25 1704 (!) 150/79 -- -- 69 12 100 %   06/25/25 1638 (!) 155/82 -- -- -- -- --   06/25/25 1635 -- 98.2 °F (36.8 °C) Oral 78 16 94 %      Visit Vitals  BP (!) 149/79   Pulse 74   Temp 98.2 °F (36.8 °C) (Oral)   Resp 15   SpO2 100%        LDAs:   Peripheral IV 06/25/25 Right Antecubital (Active)       Peripheral IV 06/25/25 Left Forearm (Active)   Site Assessment Clean, dry & intact 06/25/25 1716   Line Status Brisk blood return;Normal saline locked;Specimen collected 06/25/25 1716       Ambulatory Status:  Presents to emergency department  because of falls (Syncope, seizure, or loss of consciousness): Yes, Age > 70: Yes, Altered Mental Status, Intoxication with alcohol or substance confusion (Disorientation, impaired judgment, poor safety awaremess, or inability to follow

## 2025-06-25 NOTE — ED PROVIDER NOTES
[facility]     Emergency Department     Faculty Note/ Attestation      Pt Name: Toshia South                                       MRN: [id number]  Birthdate 1945  Date of evaluation: 6/25/2025    Patients PCP:    Jong Moraes MD    Note Started: 5:10 PM EDT      Attestation  I performed a history and physical examination of the patient and discussed management with the resident. I reviewed the resident’s note and agree with the documented findings and plan of care. Any areas of disagreement are noted on the chart. I was personally present for the key portions of any procedures. I have documented in the chart those procedures where I was not present during the key portions. I have reviewed the emergency nurses triage note. I agree with the chief complaint, past medical history, past surgical history, allergies, medications, social and family history as documented unless otherwise noted below.    For Physician Assistant/ Nurse Practitioner cases/documentation I have personally evaluated this patient and have completed at least one if not all key elements of the E/M (history, physical exam, and MDM). Additional findings are as noted.      Initial Screens:        [place] Coma Scale  Eye Opening: Spontaneous  Best Verbal Response: Oriented  Best Motor Response: Obeys commands  [place]ne Coma Scale Score: 15    Vitals:    Vitals:    06/25/25 1635 06/25/25 1638   BP:  (!) 155/82   Pulse: 78    Resp: 16    Temp: 98.2 °F (36.8 °C)    TempSrc: Oral    SpO2: 94%        CHIEF COMPLAINT       Chief Complaint   Patient presents with    Abdominal Pain             DIAGNOSTIC RESULTS             RADIOLOGY:   XR HAND LEFT (MIN 3 VIEWS)    (Results Pending)         LABS:  Labs Reviewed - No data to display      EMERGENCY DEPARTMENT COURSE:     -------------------------  BP: (!) 155/82, Temp: 98.2 °F (36.8 °C), Pulse: 78, Respirations: 16      Comments    [place] transfer  Trip and fall, struck

## 2025-06-25 NOTE — ED PROVIDER NOTES
[facility]  Emergency Department  Emergency Medicine Resident Turn-Over     Note Started: 6:04 PM EDT    Care of Toshia South was assumed from Dr. Blandon and is being seen for Abdominal Pain  .  The patient's initial evaluation and plan have been discussed with the prior provider who initially evaluated the patient.     EMERGENCY DEPARTMENT COURSE / MEDICAL DECISION MAKING:       MEDICATIONS GIVEN:  Orders Placed This Encounter   Medications    DISCONTD: acetaminophen (TYLENOL) tablet 1,000 mg    fentaNYL (SUBLIMAZE) injection 50 mcg    morphine injection 4 mg    sodium chloride flush 0.9 % injection 5-40 mL    sodium chloride flush 0.9 % injection 10 mL    0.9 % sodium chloride infusion    OR Linked Order Group     potassium chloride (KLOR-CON M) extended release tablet 40 mEq     potassium bicarb-citric acid (EFFER-K) effervescent tablet 40 mEq     potassium chloride 10 mEq/100 mL IVPB (Peripheral Line)    magnesium sulfate 1000 mg in dextrose 5% 100 mL IVPB    OR Linked Order Group     ondansetron (ZOFRAN-ODT) disintegrating tablet 4 mg     ondansetron (ZOFRAN) injection 4 mg    DISCONTD: polyethylene glycol (GLYCOLAX) packet 17 g    DISCONTD: acetaminophen (TYLENOL) tablet 650 mg    DISCONTD: acetaminophen (TYLENOL) suppository 650 mg    0.9 % sodium chloride infusion    naloxone 0.4 mg in 10 mL sodium chloride syringe    sodium chloride flush 0.9 % injection 5-40 mL    sodium chloride flush 0.9 % injection 5-40 mL    0.9 % sodium chloride infusion    DISCONTD: fentaNYL (SUBLIMAZE) injection 25 mcg    DISCONTD: fentaNYL (SUBLIMAZE) injection 50 mcg    ondansetron (ZOFRAN) injection 4 mg    diphenhydrAMINE (BENADRYL) injection 12.5 mg    atorvastatin (LIPITOR) tablet 40 mg    bumetanide (BUMEX) tablet 2 mg    buPROPion (WELLBUTRIN XL) extended release tablet 300 mg    calcium carb-cholecalciferol 250-3.125 MG-MCG per tab 1 tablet    citalopram (CELEXA) tablet 40 mg    ipratropium 0.5  No

## 2025-06-25 NOTE — ED NOTES
Pt has a skin tear on left upper arm - had a band-aid on it - replaced with a non-adherent bandage/co-ban.

## 2025-06-25 NOTE — ED PROVIDER NOTES
[facility] EMERGENCY DEPARTMENT  Emergency Department Encounter  Emergency Medicine Resident     Pt Name:Toshia South  MRN: [id number]  Birthdate 1945  Date of evaluation: 6/25/25  PCP:  Jong Moraes MD  Note Started: 5:03 PM EDT      CHIEF COMPLAINT       Chief Complaint   Patient presents with    Abdominal Pain       HISTORY OF PRESENT ILLNESS  (Location/Symptom, Timing/Onset, Context/Setting, Quality, Duration, Modifying Factors, Severity.)    Toshia South is a 79 y.o. female who presents to the emergency department after a fall this morning.  Patient states that she fell after tripping on her oxygen cord this morning and hit her head on the floor.  There was no loss of consciousness.  Patient did see some swelling to the left temporal area.  She states that she is on Xarelto and was worried about the head injury so she came in to be evaluated.  Patient does have a history of a brain bleed 3 years ago after she took a fall and had to have surgery for that.  Patient denies any headache currently she denies any nausea or vomiting.  She denies any other injuries.     Patient does tell me that she has had this lower abdominal pain for the last 4 days and currently rates it at a 6 out of 10.  Her last bowel movement was about 3 days ago.  She denies any urinary symptoms.  She denies any dysuria or hematuria.    Patient is a transfer from [facility] for Gastrografin study for sigmoid volvulus    PAST MEDICAL / SURGICAL / SOCIAL / FAMILY HISTORY      has a past medical history of A-fib ([facility]), Anxiety, Atrial fibrillation ([facility]), Biventricular congestive heart failure ([facility]), Bronchiectasis with acute exacerbation ([facility]), CAD (coronary artery disease), Chronic pain syndrome, COPD (chronic obstructive pulmonary disease) ([facility]), Depression, GERD (gastroesophageal reflux disease), Hypothyroidism, Impaired fasting glucose, Iron deficiency anemia, Osteoporosis, Pulmonary fibrosis ([facility]),

## 2025-06-25 NOTE — ED NOTES
The following labs were labeled with appropriate pt sticker and tubed to lab:     [x] Blue     [x] Lavender   [] on ice  [x] Green/yellow  [x] Green/black [] on ice  [] Alcazar  [] on ice  [x] Yellow  [] Red  [x] Pink  [] Type/ Screen  [] ABG  [] VBG    [] COVID-19 swab    [] Rapid  [] PCR  [] Flu swab  [] Peds Viral Panel     [] Urine Sample  [] Fecal Sample  [] Pelvic Cultures  [] Blood Cultures  [] X 2  [] STREP Cultures  [] Wound Cultures

## 2025-06-25 NOTE — PROGRESS NOTES
2 gold rings removed from left ring finger and put in container.  2 silver rings remain on pt right ringer finger.  Bottom dentures and gold rings will be given to PACU RN.

## 2025-06-25 NOTE — CONSULTS
GI Consult Note:    Name: Toshia South  MRN: [id number]     Acct: [id number]  Room: 21/21    Admit Date: 6/25/2025  PCP: Jong Moraes MD    Physician Requesting Consult: Chuckie Lockhart MD     Reason for Consult: Sigmoid volvulus    Chief Complaint:     Chief Complaint   Patient presents with    Abdominal Pain       History of Present Illness:      Toshia South is a 79 y.o.  female past medical history of COPD, pulmonary fibrosis, oxygen dependent, A-fib on Xarelto, CAD on baby aspirin, who presents with Abdominal Pain    Patient is a transfer from [place] for sigmoid volvulus.  She initially presents after a fall without loss of consciousness.  She has been having lower abdominal pain for 4 days with last bowel movement 3 days ago. CT scan showed concern for sigmoid volvulus.  Spoke with ED and surgery recommends flex sig decompression.  BMP, CBC, lactate unremarkable.  Vitals stable. No prior knowledge of sigmoid volvulus issues. No acute abdomen on exam    CT 6/25/2025  1. Sigmoid now elongated, extending into the upper abdomen and mildly  distended; definite twisting/or volvulus not identified, but there is a large  amount of retained stool in proximal sigmoid and the remainder of more  proximal small bowel to the cecum. In addition, some soft tissue stranding in  the sigmoid mesentery noted, in the pelvis. Findings are concerning for  intermittent/partial sigmoid volvulus.  Consider Gastrografin BE which may be  diagnostic and therapeutic.  2. No free air or significant pathologic free fluid.  3. Moderate-large hiatus hernia again noted.  4. Probable small layering gallstones but no CT evidence cholecystitis    Objective:     PAST MEDICAL/SURGICAL HISTORY  Past Medical History:   Diagnosis Date    A-fib (HCC)     Anxiety     Atrial fibrillation (HCC)     Biventricular congestive heart failure (HCC)     Bronchiectasis with acute exacerbation (HCC) 04/29/2022    CAD (coronary artery disease)

## 2025-06-25 NOTE — ED NOTES
Pt states that she has a history of cerebral hemorrhage a few years ago that required surgery. She is worried that when she fell and hit her head on the carpet today that she may have another one. She stated she is also on  \"blood thinners.\" Pt is A & O x 4 and follows all commands without difficulty. Care ongoing, call light within reach.

## 2025-06-26 ENCOUNTER — APPOINTMENT (OUTPATIENT)
Dept: GENERAL RADIOLOGY | Age: 80
End: 2025-06-26
Payer: MEDICARE

## 2025-06-26 PROBLEM — K56.2 SIGMOID VOLVULUS (HCC): Status: ACTIVE | Noted: 2025-06-26

## 2025-06-26 PROBLEM — Z01.818 PRE-OP EVALUATION: Status: ACTIVE | Noted: 2025-06-26

## 2025-06-26 PROBLEM — I25.10 CORONARY ARTERY DISEASE INVOLVING NATIVE CORONARY ARTERY OF NATIVE HEART WITHOUT ANGINA PECTORIS: Status: ACTIVE | Noted: 2025-06-26

## 2025-06-26 LAB
ALBUMIN SERPL-MCNC: 3.9 G/DL (ref 3.5–5.2)
ALBUMIN/GLOB SERPL: 1.3 {RATIO} (ref 1–2.5)
ALP SERPL-CCNC: 109 U/L (ref 35–104)
ALT SERPL-CCNC: 23 U/L (ref 10–35)
AST SERPL-CCNC: 30 U/L (ref 10–35)
BILIRUB DIRECT SERPL-MCNC: 0.2 MG/DL (ref 0–0.2)
BILIRUB INDIRECT SERPL-MCNC: 0.4 MG/DL (ref 0–1)
BILIRUB SERPL-MCNC: 0.6 MG/DL (ref 0–1.2)
GLOBULIN SER CALC-MCNC: 3.1 G/DL
INR PPP: 1.2
PROT SERPL-MCNC: 7 G/DL (ref 6.6–8.7)
PROTHROMBIN TIME: 15.2 SEC (ref 11.7–14.9)

## 2025-06-26 PROCEDURE — 99231 SBSQ HOSP IP/OBS SF/LOW 25: CPT | Performed by: SURGERY

## 2025-06-26 PROCEDURE — 6360000002 HC RX W HCPCS

## 2025-06-26 PROCEDURE — 74018 RADEX ABDOMEN 1 VIEW: CPT

## 2025-06-26 PROCEDURE — 94761 N-INVAS EAR/PLS OXIMETRY MLT: CPT

## 2025-06-26 PROCEDURE — 36415 COLL VENOUS BLD VENIPUNCTURE: CPT

## 2025-06-26 PROCEDURE — 85610 PROTHROMBIN TIME: CPT

## 2025-06-26 PROCEDURE — 97530 THERAPEUTIC ACTIVITIES: CPT

## 2025-06-26 PROCEDURE — 97166 OT EVAL MOD COMPLEX 45 MIN: CPT

## 2025-06-26 PROCEDURE — 99222 1ST HOSP IP/OBS MODERATE 55: CPT | Performed by: INTERNAL MEDICINE

## 2025-06-26 PROCEDURE — 6370000000 HC RX 637 (ALT 250 FOR IP)

## 2025-06-26 PROCEDURE — 2060000000 HC ICU INTERMEDIATE R&B

## 2025-06-26 PROCEDURE — 93005 ELECTROCARDIOGRAM TRACING: CPT

## 2025-06-26 PROCEDURE — 99223 1ST HOSP IP/OBS HIGH 75: CPT | Performed by: INTERNAL MEDICINE

## 2025-06-26 PROCEDURE — 80076 HEPATIC FUNCTION PANEL: CPT

## 2025-06-26 PROCEDURE — 97535 SELF CARE MNGMENT TRAINING: CPT

## 2025-06-26 PROCEDURE — 2500000003 HC RX 250 WO HCPCS: Performed by: ANESTHESIOLOGY

## 2025-06-26 PROCEDURE — 2700000000 HC OXYGEN THERAPY PER DAY

## 2025-06-26 PROCEDURE — 99232 SBSQ HOSP IP/OBS MODERATE 35: CPT | Performed by: STUDENT IN AN ORGANIZED HEALTH CARE EDUCATION/TRAINING PROGRAM

## 2025-06-26 PROCEDURE — 6370000000 HC RX 637 (ALT 250 FOR IP): Performed by: NURSE PRACTITIONER

## 2025-06-26 PROCEDURE — 2580000003 HC RX 258: Performed by: INTERNAL MEDICINE

## 2025-06-26 PROCEDURE — 6370000000 HC RX 637 (ALT 250 FOR IP): Performed by: SURGERY

## 2025-06-26 PROCEDURE — 2500000003 HC RX 250 WO HCPCS: Performed by: INTERNAL MEDICINE

## 2025-06-26 PROCEDURE — 94640 AIRWAY INHALATION TREATMENT: CPT

## 2025-06-26 PROCEDURE — 97162 PT EVAL MOD COMPLEX 30 MIN: CPT

## 2025-06-26 RX ORDER — POLYETHYLENE GLYCOL 3350 17 G/17G
238 POWDER, FOR SOLUTION ORAL ONCE
Status: COMPLETED | OUTPATIENT
Start: 2025-06-26 | End: 2025-06-26

## 2025-06-26 RX ORDER — LABETALOL HYDROCHLORIDE 5 MG/ML
10 INJECTION, SOLUTION INTRAVENOUS EVERY 6 HOURS PRN
Status: DISCONTINUED | OUTPATIENT
Start: 2025-06-26 | End: 2025-06-28 | Stop reason: HOSPADM

## 2025-06-26 RX ORDER — HYDROCODONE BITARTRATE AND ACETAMINOPHEN 5; 325 MG/1; MG/1
1 TABLET ORAL EVERY 4 HOURS PRN
Status: DISCONTINUED | OUTPATIENT
Start: 2025-06-26 | End: 2025-06-28 | Stop reason: HOSPADM

## 2025-06-26 RX ORDER — TRAZODONE HYDROCHLORIDE 50 MG/1
100 TABLET ORAL NIGHTLY
Status: DISCONTINUED | OUTPATIENT
Start: 2025-06-26 | End: 2025-06-28 | Stop reason: HOSPADM

## 2025-06-26 RX ORDER — ACETAMINOPHEN 500 MG
1000 TABLET ORAL EVERY 8 HOURS SCHEDULED
Status: DISCONTINUED | OUTPATIENT
Start: 2025-06-26 | End: 2025-06-28 | Stop reason: HOSPADM

## 2025-06-26 RX ORDER — ALBUTEROL SULFATE 90 UG/1
2 INHALANT RESPIRATORY (INHALATION) EVERY 4 HOURS PRN
Status: DISCONTINUED | OUTPATIENT
Start: 2025-06-26 | End: 2025-06-28 | Stop reason: HOSPADM

## 2025-06-26 RX ORDER — NEOMYCIN SULFATE 500 MG/1
1000 TABLET ORAL 3 TIMES DAILY
Status: COMPLETED | OUTPATIENT
Start: 2025-06-26 | End: 2025-06-26

## 2025-06-26 RX ORDER — KETOROLAC TROMETHAMINE 15 MG/ML
15 INJECTION, SOLUTION INTRAMUSCULAR; INTRAVENOUS EVERY 6 HOURS PRN
Status: DISCONTINUED | OUTPATIENT
Start: 2025-06-26 | End: 2025-06-28 | Stop reason: HOSPADM

## 2025-06-26 RX ORDER — METRONIDAZOLE 500 MG/1
500 TABLET ORAL 3 TIMES DAILY
Status: COMPLETED | OUTPATIENT
Start: 2025-06-26 | End: 2025-06-26

## 2025-06-26 RX ADMIN — SODIUM CHLORIDE, PRESERVATIVE FREE 10 ML: 5 INJECTION INTRAVENOUS at 20:30

## 2025-06-26 RX ADMIN — POLYETHYLENE GLYCOL 3350 17 G: 17 POWDER, FOR SOLUTION ORAL at 09:29

## 2025-06-26 RX ADMIN — SODIUM CHLORIDE: 0.9 INJECTION, SOLUTION INTRAVENOUS at 12:02

## 2025-06-26 RX ADMIN — HYDROCODONE BITARTRATE AND ACETAMINOPHEN 1 TABLET: 5; 325 TABLET ORAL at 05:25

## 2025-06-26 RX ADMIN — IPRATROPIUM BROMIDE AND ALBUTEROL SULFATE 1 DOSE: .5; 2.5 SOLUTION RESPIRATORY (INHALATION) at 22:29

## 2025-06-26 RX ADMIN — HYDROCODONE BITARTRATE AND ACETAMINOPHEN 1 TABLET: 5; 325 TABLET ORAL at 23:47

## 2025-06-26 RX ADMIN — Medication 1 TABLET: at 09:29

## 2025-06-26 RX ADMIN — KETOROLAC TROMETHAMINE 15 MG: 15 INJECTION, SOLUTION INTRAMUSCULAR; INTRAVENOUS at 02:52

## 2025-06-26 RX ADMIN — HYDROCODONE BITARTRATE AND ACETAMINOPHEN 1 TABLET: 5; 325 TABLET ORAL at 01:15

## 2025-06-26 RX ADMIN — TIOTROPIUM BROMIDE AND OLODATEROL 2 PUFF: 3.124; 2.736 SPRAY, METERED RESPIRATORY (INHALATION) at 09:04

## 2025-06-26 RX ADMIN — METRONIDAZOLE 500 MG: 500 TABLET ORAL at 17:25

## 2025-06-26 RX ADMIN — POLYETHYLENE GLYCOL 3350 238 G: 17 POWDER, FOR SOLUTION ORAL at 17:38

## 2025-06-26 RX ADMIN — NEOMYCIN SULFATE 1000 MG: 500 TABLET ORAL at 21:32

## 2025-06-26 RX ADMIN — HYDROCODONE BITARTRATE AND ACETAMINOPHEN 1 TABLET: 5; 325 TABLET ORAL at 14:57

## 2025-06-26 RX ADMIN — IPRATROPIUM BROMIDE AND ALBUTEROL SULFATE 1 DOSE: .5; 2.5 SOLUTION RESPIRATORY (INHALATION) at 09:03

## 2025-06-26 RX ADMIN — METRONIDAZOLE 500 MG: 500 TABLET ORAL at 21:32

## 2025-06-26 RX ADMIN — NEOMYCIN SULFATE 1000 MG: 500 TABLET ORAL at 14:57

## 2025-06-26 RX ADMIN — SODIUM CHLORIDE, PRESERVATIVE FREE 10 ML: 5 INJECTION INTRAVENOUS at 10:05

## 2025-06-26 RX ADMIN — NEOMYCIN SULFATE 1000 MG: 500 TABLET ORAL at 17:25

## 2025-06-26 RX ADMIN — CALCIUM CARBONATE-CHOLECALCIFEROL TAB 250 MG-125 UNIT 1 TABLET: 250-125 TAB at 09:29

## 2025-06-26 RX ADMIN — SODIUM CHLORIDE, PRESERVATIVE FREE 10 ML: 5 INJECTION INTRAVENOUS at 09:30

## 2025-06-26 RX ADMIN — METRONIDAZOLE 500 MG: 500 TABLET ORAL at 14:57

## 2025-06-26 RX ADMIN — TRAZODONE HYDROCHLORIDE 100 MG: 50 TABLET ORAL at 21:32

## 2025-06-26 RX ADMIN — KETOROLAC TROMETHAMINE 15 MG: 15 INJECTION, SOLUTION INTRAMUSCULAR; INTRAVENOUS at 20:27

## 2025-06-26 ASSESSMENT — PAIN SCALES - GENERAL
PAINLEVEL_OUTOF10: 8
PAINLEVEL_OUTOF10: 9
PAINLEVEL_OUTOF10: 8
PAINLEVEL_OUTOF10: 6
PAINLEVEL_OUTOF10: 8
PAINLEVEL_OUTOF10: 4
PAINLEVEL_OUTOF10: 0
PAINLEVEL_OUTOF10: 9
PAINLEVEL_OUTOF10: 8

## 2025-06-26 ASSESSMENT — ENCOUNTER SYMPTOMS
DIARRHEA: 0
ABDOMINAL PAIN: 0
CONSTIPATION: 0

## 2025-06-26 ASSESSMENT — PAIN - FUNCTIONAL ASSESSMENT
PAIN_FUNCTIONAL_ASSESSMENT: ACTIVITIES ARE NOT PREVENTED

## 2025-06-26 ASSESSMENT — PAIN DESCRIPTION - DESCRIPTORS
DESCRIPTORS: ACHING;DISCOMFORT
DESCRIPTORS: ACHING;DISCOMFORT
DESCRIPTORS: ACHING
DESCRIPTORS: ACHING;SORE
DESCRIPTORS: CRAMPING;DISCOMFORT

## 2025-06-26 ASSESSMENT — PAIN DESCRIPTION - LOCATION
LOCATION: GENERALIZED
LOCATION: BACK
LOCATION: ABDOMEN
LOCATION: BACK;LEG
LOCATION: SHOULDER
LOCATION: SHOULDER

## 2025-06-26 ASSESSMENT — PAIN DESCRIPTION - ORIENTATION
ORIENTATION: LOWER;LEFT;RIGHT
ORIENTATION: LEFT
ORIENTATION: LEFT;MID
ORIENTATION: LOWER
ORIENTATION: RIGHT

## 2025-06-26 ASSESSMENT — PAIN SCALES - WONG BAKER: WONGBAKER_NUMERICALRESPONSE: HURTS A LITTLE BIT

## 2025-06-26 NOTE — PROGRESS NOTES
Occupational Therapy  Occupational Therapy Initial Evaluation  Facility/Department: [facility] 4B STEPDOWN   Patient Name: Toshia South        MRN: [id number]    : 1945    Date of Service: 2025    Chief Complaint   Patient presents with    Abdominal Pain     Past Medical History:  has a past medical history of A-fib (HCC), Anxiety, Atrial fibrillation (HCC), Biventricular congestive heart failure (HCC), Bronchiectasis with acute exacerbation (HCC), CAD (coronary artery disease), Chronic pain syndrome, COPD (chronic obstructive pulmonary disease) (HCC), Depression, GERD (gastroesophageal reflux disease), Hypothyroidism, Impaired fasting glucose, Iron deficiency anemia, Osteoporosis, Pulmonary fibrosis (HCC), Sleep apnea, and Subdural hematoma (HCC).  Past Surgical History:  has a past surgical history that includes Hysterectomy (1991); Carpal tunnel release (Right, 1999); Total knee arthroplasty (Right, 2021); fracture surgery (Left, 2018); Wrist fracture surgery (Left, 2018); lumbar discectomy (1993); Lumbar disc surgery (02/15/1994); back surgery (1998); back surgery (1999); Carpal tunnel release (Left, 1999); Neck surgery (2002); Carpal tunnel release (Right, 2002); Carpal tunnel release (Left, 2003); back surgery (10/23/2003); back surgery (10/23/2003); Cervical disc surgery (10/25/2004); Cervical disc surgery (2004); Neck surgery (2005); Toe amputation (10/08/2006); Toe amputation (2008); lumbar laminectomy (2008); Toe amputation (10/03/2008); Humerus fracture surgery (Right, 10/03/2010); Knee arthroscopy (Right, 2011); back surgery (2017); knee surgery (Right, 2016); Wrist fracture surgery (Left, 2018); Wrist surgery (Left, 2019); craniotomy (Left, 2022); Nerve Block (N/A, 2024); Total hip arthroplasty (Left, 2025); Total hip arthroplasty (Left, 2025);  strategies PRN  Short Term Goal 5: Demo UB ADLs with Mod IND and use of DME/adaptive strategies PRN    Plan  Occupational Therapy Plan  Times Per Week: 3-4x/wk  Current Treatment Recommendations: Balance training, Functional mobility training, Endurance training, Safety education & training, Patient/Caregiver education & training, Equipment evaluation, education, & procurement, Self-Care / ADL, Strengthening, Home management training    Minutes  OT Individual Minutes  Time In: 1053  Time Out: 1127  Minutes: 34  Time Code Minutes   Timed Code Treatment Minutes: 23 Minutes    Electronically signed by HARLEY Mishra/L on 6/26/25 at 3:32 PM EDT

## 2025-06-26 NOTE — CARE COORDINATION
Case Management Assessment  Initial Evaluation    Date/Time of Evaluation: 6/26/2025 1158 AM  Assessment Completed by: Toña Clark    If patient is discharged prior to next notation, then this note serves as note for discharge by case management.    Patient Name: Toshia South                   YOB: 1945  Diagnosis: Volvulus (HCC) [K56.2]  Sigmoid volvulus (HCC) [K56.2]                   Date / Time: 6/25/2025  4:33 PM    Patient Admission Status: Inpatient   Readmission Risk (Low < 19, Mod (19-27), High > 27): Readmission Risk Score: 20    Current PCP: Jong Moraes MD  PCP verified by CM? (P) Yes    Chart Reviewed: Yes      History Provided by: (P) Patient  Patient Orientation: (P) Alert and Oriented, Person, Place, Situation, Self    Patient Cognition: (P) Alert    Hospitalization in the last 30 days (Readmission):  No    If yes, Readmission Assessment in CM Navigator will be completed.    Advance Directives:      Code Status: Full Code   Patient's Primary Decision Maker is: (P) Legal Next of Kin    Primary Decision Maker (Active): Jerrell South - Spouse - [phone]    Secondary Decision Maker: Lucio South - Child - [phone]    Supplemental (Other) Decision Maker: Jose Eduardo South - Micha - [phone]    Discharge Planning:    Patient lives with: (P) Spouse/Significant Other Type of Home: (P) House  Primary Care Giver: (P) Self  Patient Support Systems include: (P) Spouse/Significant Other, Children   Current Financial resources: (P) Medicare, Other (Comment) (has Sil[employer] supplement)  Current community resources: (P) None  Current services prior to admission: (P) Durable Medical Equipment            Current DME: (P) Bedside Commode, Walker, Wheelchair, Shower Chair (has grab bars in bathroom)            Type of Home Care services:  (P) None    ADLS  Prior functional level: (P) Independent in ADLs/IADLs  Current functional level: (P) Assistance with the following:, Bathing,

## 2025-06-26 NOTE — PROGRESS NOTES
[facility] Gastroenterology Progress Note    Toshia South is a 79 y.o. female patient.  Hospitalization Day:1      Chief consult reason:   Sigmoid volvulus     Subjective:  Patient seen examined at the bedside.  No complaints of abdominal pain.  No nausea and vomiting.Patient is now status post decompression, with placement of decompression.  Patient reporting multiple bowel movements with spontaneous removal of decompression tube. Abdomen is distended, however soft.  Bowel sounds present.  Improvement in abdominal pain    Objective:   VITALS:  BP (!) 159/83   Pulse 94   Temp 98.2 °F (36.8 °C) (Oral)   Resp 14   SpO2 93%   TEMPERATURE:  Current - Temp: 98.2 °F (36.8 °C); Max - Temp  Av.9 °F (36.6 °C)  Min: 96.8 °F (36 °C)  Max: 98.7 °F (37.1 °C)    Physical Assessment:  Physical Exam  Constitutional:       General: She is not in acute distress.     Appearance: She is not ill-appearing.   HENT:      Mouth/Throat:      Mouth: Mucous membranes are moist.      Pharynx: Oropharynx is clear.   Eyes:      Pupils: Pupils are equal, round, and reactive to light.   Cardiovascular:      Rate and Rhythm: Normal rate.   Pulmonary:      Effort: Pulmonary effort is normal. No respiratory distress.   Abdominal:      General: Abdomen is flat. There is distension.      Palpations: Abdomen is soft.      Tenderness: There is no abdominal tenderness. There is no guarding.   Skin:     General: Skin is warm and dry.   Neurological:      Mental Status: She is alert and oriented to person, place, and time.         Review of Systems:  Review of Systems   All other systems reviewed and are negative.      CURRENT MEDICATIONS:  Scheduled Meds:   acetaminophen  1,000 mg Oral 3 times per day    polyethylene glycol  238 g Oral Once    neomycin  1,000 mg Oral TID    And    metroNIDAZOLE  500 mg Oral TID    sodium chloride flush  5-40 mL IntraVENous 2 times per day    sodium chloride flush  5-40 mL IntraVENous 2 times per day

## 2025-06-26 NOTE — PLAN OF CARE
Surgery    Discussed with patient and reviewed imaging.   I am not convinced she had a true volvulus and her imaging and GI findings are not conclusive. The patient does feel better today and is tolerating clears.     Will plan to perform gentle bowel prep.  If the patient's symptoms recur by tomorrow, then will plan on sigmoidectomy.  If she still is feeling okay then will resume diet in the morning and no plans for surgery.  IF a volvulus occurs again then will plan on sigmoidectomy.     Rachel Reyez MD

## 2025-06-26 NOTE — PROGRESS NOTES
[facility]  Internal Medicine Teaching Residency Program  Inpatient Daily Progress Note  ______________________________________________________________________________    Patient: Toshia South  YOB: 1945   MRN:[id number]    Acct: [id number]     Room: [facility]/0432-02  Admit date: 6/25/2025  Today's date: 06/26/25  Number of days in the hospital: 1  CODE STATUS: Full Code     SUBJECTIVE   Admitting Diagnosis: Volvulus (HCC)  CC: Abdominal pain and mechanical fall   Pt examined at bedside. Chart & results reviewed.     -Overnight events: No acute events overnight  -Current hemodynamic status: Hemodynamically stable  -Urine output: 1600 mL over 24 hours    Morning labs: pending .  Diet: Diet NPO     Review of Systems   Constitutional:  Negative for chills and fever.   Cardiovascular:  Negative for chest pain.   Gastrointestinal:  Negative for abdominal pain, constipation and diarrhea.   Psychiatric/Behavioral:  Negative for confusion.         Specialty recommendations:  General Surgery: For sigmoid volvulus    GI: for flexible sigmoidoscopy    Brief internal medicine plan:  Follow-up on general surgery recommendations    BRIEF HISTORY     The patient is a 79 y.o. female with PMHx of   COPD on 4 L of supplemental oxygen at home  Mixed hyperlipidemia  Hypothyroidism  VIPIN  Iron deficiency anemia  Lumbosacral radiculopathy on Lyrica     Patient present to the ED with a chief complaint of fall and abdominal pain.  According the patient she was all right until day before yesterday when she had a fall, she fell on the floor, denied of loss of consciousness, dizziness and she also complained of abdominal pain and constipation denied of melena or hemoptysis.  Currently she is on Xarelto for concern of A-fib        Review of Systems   Constitutional:  Negative for activity change, chills and fever.   Gastrointestinal:  Positive for constipation. Negative for diarrhea,

## 2025-06-26 NOTE — OP NOTE
Flexible sigmoidoscopy    DATE OF PROCEDURE: 6/25/2025    SURGEON: Topher Murray MD  Facility : [facility]  ASSISTANT: None  PREOPERATIVE DIAGNOSIS: Concern for sigmoid volvulus    POSTOPERATIVE DIAGNOSIS: as described below    OPERATION: Flexible sigmoidoscopy with decompression and decompression tube placement    ANESTHESIA: General anesthesia    ESTIMATED BLOOD LOSS: less than 50 cc    COMPLICATIONS: None.     SPECIMENS:  Was Not Obtained    * No specimens in log *     HISTORY: The patient is a 79 y.o. year old female with history of above preop diagnosis.  I recommended colonoscopy with possible biopsy or polypectomy and I explained the risk, benefits, expected outcome, and alternatives to the procedure.  Risks included but are not limited to medication allergy, medication reaction, cardiovascular and respiratory problems, bleeding, perforation, infection, and/or missed diagnosis.  The patient understands and is in agreement.        PROCEDURE: Following arrival in the endoscopy room, the patient was placed in the left lateral decubitus position and final time-out accomplished in the presence of the nursing staff. Baseline vital signs were obtained and reviewed. The patient was given IV general anesthesia and vitals monitoring per anesthesia department.     Digital rectal exam- abnormal: Small external hemorrhoids    The colonoscope was inserted per rectum and advanced under direct vision.  Photodocumentation of the maximal extent reached and other findings was obtained.     Post sedation note :The patient's SPO2 remained above 90% throughout the procedure.the vital signs remained stable , and no immediate complication from the procedure noted, patient will be ready to leave when criteria is met .    The prep was inadequate after extensive lavage     Findings:  Distended and redundant sigmoid with minimal stool up to proximal sigmoid where large amount of solid stool present.  After extensive irrigation and

## 2025-06-26 NOTE — H&P
[facility]     Department of Internal Medicine - Staff Internal Medicine Teaching Service          ADMISSION NOTE/HISTORY AND PHYSICAL EXAMINATION   Date: 6/25/2025  Patient Name: Toshia South  Date of admission: 6/25/2025  4:33 PM  YOB: 1945  PCP: Jong Moraes MD  History Obtained From:  patient, electronic medical record    CHIEF COMPLAINT     Abdominal pain and mechanical fall    HISTORY OF PRESENTING ILLNESS     The patient is a 79 y.o. female with PMHx of   COPD on 4 L of supplemental oxygen at home  Mixed hyperlipidemia  Hypothyroidism  VIPIN  Iron deficiency anemia  Lumbosacral radiculopathy on Lyrica    Patient present to the ED with a chief complaint of fall and abdominal pain.  According the patient she was all right until day before yesterday when she had a fall, she fell on the floor, denied of loss of consciousness, dizziness and she also complained of abdominal pain and constipation denied of melena or hemoptysis.  Currently she is on Xarelto for concern of A-fib      Review of Systems   Constitutional:  Negative for activity change, chills and fever.   Gastrointestinal:  Positive for constipation. Negative for diarrhea, nausea and vomiting.   Genitourinary:  Negative for dysuria.   Psychiatric/Behavioral:  Negative for confusion.         Initial Vitals on Presentation:  Temp: Afebrile, RR: 16, HR 87, /80, SPO2 93% on 4 L of oxygen    Initial Course in the ED:   - Patient initially presented to emergency department of [facility] with complaint of fall and abdominal pain and had a skin tear in the left upper arm her initial lab workup at [place] ED with CT head was unremarkable however CT abdomen and pelvis showed volvulus, general surgery was consulted and patient was transferred to [place] for barium's CT study.  On arrival in the [place] ED patient was hemodynamically stable however lab workup showed lactate 0.9 general surgery

## 2025-06-26 NOTE — PROGRESS NOTES
PROGRESS NOTE  ([facility] 2.0)        PATIENT NAME: Toshia South  MEDICAL RECORD NO. [id number]  DATE: 2025  SURGEON: Abril  PRIMARY CARE PHYSICIAN: Jong Moraes MD    HD: # 1    ASSESSMENT    Patient Active Problem List   Diagnosis    Hypothyroidism    Major depressive disorder    Chronic midline low back pain without sciatica    Iron deficiency anemia    COPD (chronic obstructive pulmonary disease) (HCC)    Anemia    PAF (paroxysmal atrial fibrillation) (HCC)    IPF (idiopathic pulmonary fibrosis) (HCC)    History of subdural hematoma    GERD (gastroesophageal reflux disease)    Oxygen dependent    Dysphagia, oropharyngeal    Zenker diverticulum    Chronic pain    Secondary hypercoagulable state    NSTEMI (non-ST elevated myocardial infarction) (HCC)    Chronic respiratory failure with hypoxia (J96.11)    Ulcer of left pretibial region with fat layer exposed (HCC)    Atherosclerotic peripheral vascular disease with rest pain (HCC)    Critical limb ischemia of both lower extremities with rest pain (HCC)    Volvulus (HCC)    Colon distention    Abdominal pain    Coronary artery disease involving native coronary artery of native heart without angina pectoris    Pre-op evaluation    Sigmoid volvulus (HCC)       MEDICAL DECISION MAKING AND PLAN    Redundant colon, no current evidence of volvulus       SUBJECTIVE    Toshia South is has significantly improved since yesterday. Passing large bowel movements      OBJECTIVE  VITALS: Temp: Temp: 98.3 °F (36.8 °C)Temp  Av.9 °F (36.6 °C)  Min: 96.8 °F (36 °C)  Max: 98.7 °F (37.1 °C) BP Systolic (24hrs), Av , Min:133 , Max:180   Diastolic (24hrs), Av, Min:73, Max:99   Pulse Pulse  Av.5  Min: 68  Max: 99 Resp Resp  Avg: 15.3  Min: 11  Max: 19 Pulse ox SpO2  Av.4 %  Min: 92 %  Max: 100 %  GENERAL: alert, no distress  NEURO: positive findings:   HEENT: Eye: positive findings:   : deferred  LUNGS: clear to ausculation, without

## 2025-06-26 NOTE — CONSULTS
[facility]    Consult / H&P               Today's Date: 6/26/2025  Patient Name: Toshia South  Date of admission: 6/25/2025  4:33 PM  Patient's age: 79 y.o., 1945  Admission Dx: Volvulus (HCC) [K56.2]  Sigmoid volvulus (HCC) [K56.2]    Requesting Physician: Chuckie Lockhart MD    Cardiac Evaluation Reason:  Cardiac preop risk stratification    History Obtained From: patient and chart review     History of Present Illness:    This patient 79 y.o. years old with past medical history given below. Presented with abdominal pain and found to have sigmoid volvulus. S/p decompression by GI with srong bowel regimen. Planned surgery tomorrow needing cardiac preop stratification.    Past Medical History:   has a past medical history of A-fib (HCC), Anxiety, Atrial fibrillation (HCC), Biventricular congestive heart failure (HCC), Bronchiectasis with acute exacerbation (HCC), CAD (coronary artery disease), Chronic pain syndrome, COPD (chronic obstructive pulmonary disease) (HCC), Depression, GERD (gastroesophageal reflux disease), Hypothyroidism, Impaired fasting glucose, Iron deficiency anemia, Osteoporosis, Pulmonary fibrosis (HCC), Sleep apnea, and Subdural hematoma (HCC).    Past Surgical History:   has a past surgical history that includes Hysterectomy (08/20/1991); Carpal tunnel release (Right, 12/17/1999); Total knee arthroplasty (Right, 03/19/2021); fracture surgery (Left, 12/20/2018); Wrist fracture surgery (Left, 12/20/2018); lumbar discectomy (08/30/1993); Lumbar disc surgery (02/15/1994); back surgery (09/09/1998); back surgery (08/04/1999); Carpal tunnel release (Left, 11/24/1999); Neck surgery (05/03/2002); Carpal tunnel release (Right, 12/30/2002); Carpal tunnel release (Left, 12/17/2003); back surgery (10/23/2003); back surgery (10/23/2003); Cervical disc surgery (10/25/2004); Cervical disc surgery (12/22/2004); Neck surgery (12/09/2005); Toe amputation (10/08/2006); Toe amputation

## 2025-06-26 NOTE — CONSULTS
Initial Consult    PATIENT NAME: Toshia South  AGE: 79 y.o.  MEDICAL RECORD NO. [id number]  DATE: 6/25/2025  SURGEON: Dr. Fitzpatrick  PRIMARY CARE PHYSICIAN: Jong Moraes MD    Patient evaluated at the request of  Dr. Lockhart  Reason for evaluation: ?partial sigmoid volvulus    Patient information was obtained from patient and spouse/SO.  History/Exam limitations: none.  Patient presented to the Emergency Department by private vehicle.    IMPRESSION:     Patient Active Problem List   Diagnosis    Hypothyroidism    Major depressive disorder    Chronic midline low back pain without sciatica    Iron deficiency anemia    COPD (chronic obstructive pulmonary disease) (HCC)    Anemia    PAF (paroxysmal atrial fibrillation) (HCC)    IPF (idiopathic pulmonary fibrosis) (HCC)    History of subdural hematoma    GERD (gastroesophageal reflux disease)    Oxygen dependent    Dysphagia, oropharyngeal    Zenker diverticulum    Chronic pain    Secondary hypercoagulable state    NSTEMI (non-ST elevated myocardial infarction) (HCC)    Chronic respiratory failure with hypoxia (J96.11)    Ulcer of left pretibial region with fat layer exposed (HCC)    Atherosclerotic peripheral vascular disease with rest pain (HCC)    Critical limb ischemia of both lower extremities with rest pain (HCC)    Volvulus (HCC)    Colon distention    Abdominal pain    Toshia South is a 79 y.o. female who presents with medical history of COPD, Pulm fibrosis, O2 dependency 4Lpm at home, Afib on Xarelto. CAD, CHF multiple C sections x 5, pain pump placement x2 in anterior abdominal wall came to [facility] after fall with bruise of the left hand and headache. Investigations were done which showed suspected partial volvulus.as         PLAN:as discussed with Dr. Fitzpatrick   Will recommend GI consult for Colonoscopy and decompression of colon  Admission to hospital under medicine service  Once decompressed, would recommend strong bowel

## 2025-06-26 NOTE — PROGRESS NOTES
Physical Therapy  Facility/Department: [address] STEPDOWN   Physical Therapy Initial Evaluation    Patient Name: Toshia South        MRN: [id number]    : 1945    Date of Service: 2025    Chief Complaint   Patient presents with    Abdominal Pain     Past Medical History:  has a past medical history of A-fib (HCC), Anxiety, Atrial fibrillation (HCC), Biventricular congestive heart failure (HCC), Bronchiectasis with acute exacerbation (HCC), CAD (coronary artery disease), Chronic pain syndrome, COPD (chronic obstructive pulmonary disease) (HCC), Depression, GERD (gastroesophageal reflux disease), Hypothyroidism, Impaired fasting glucose, Iron deficiency anemia, Osteoporosis, Pulmonary fibrosis (HCC), Sleep apnea, and Subdural hematoma (HCC).  Past Surgical History:  has a past surgical history that includes Hysterectomy (1991); Carpal tunnel release (Right, 1999); Total knee arthroplasty (Right, 2021); fracture surgery (Left, 2018); Wrist fracture surgery (Left, 2018); lumbar discectomy (1993); Lumbar disc surgery (02/15/1994); back surgery (1998); back surgery (1999); Carpal tunnel release (Left, 1999); Neck surgery (2002); Carpal tunnel release (Right, 2002); Carpal tunnel release (Left, 2003); back surgery (10/23/2003); back surgery (10/23/2003); Cervical disc surgery (10/25/2004); Cervical disc surgery (2004); Neck surgery (2005); Toe amputation (10/08/2006); Toe amputation (2008); lumbar laminectomy (2008); Toe amputation (10/03/2008); Humerus fracture surgery (Right, 10/03/2010); Knee arthroscopy (Right, 2011); back surgery (2017); knee surgery (Right, 2016); Wrist fracture surgery (Left, 2018); Wrist surgery (Left, 2019); craniotomy (Left, 2022); Nerve Block (N/A, 2024); Total hip arthroplasty (Left, 2025); Total hip arthroplasty (Left, 2025); Flexible

## 2025-06-27 ENCOUNTER — APPOINTMENT (OUTPATIENT)
Dept: GENERAL RADIOLOGY | Age: 80
End: 2025-06-27
Payer: MEDICARE

## 2025-06-27 ENCOUNTER — APPOINTMENT (OUTPATIENT)
Dept: CT IMAGING | Age: 80
End: 2025-06-27
Payer: MEDICARE

## 2025-06-27 LAB
ABO + RH BLD: NORMAL
ANION GAP SERPL CALCULATED.3IONS-SCNC: 14 MMOL/L (ref 9–16)
ARM BAND NUMBER: NORMAL
BASOPHILS # BLD: 0.04 K/UL (ref 0–0.2)
BASOPHILS NFR BLD: 1 % (ref 0–2)
BLOOD BANK SAMPLE EXPIRATION: NORMAL
BLOOD GROUP ANTIBODIES SERPL: NEGATIVE
BUN SERPL-MCNC: 8 MG/DL (ref 8–23)
CA-I BLD-SCNC: 1.26 MMOL/L (ref 1.13–1.33)
CALCIUM SERPL-MCNC: 9.3 MG/DL (ref 8.6–10.4)
CHLORIDE SERPL-SCNC: 100 MMOL/L (ref 98–107)
CO2 SERPL-SCNC: 22 MMOL/L (ref 20–31)
CREAT SERPL-MCNC: 0.5 MG/DL (ref 0.6–0.9)
EKG ATRIAL RATE: 83 BPM
EKG P AXIS: 56 DEGREES
EKG P-R INTERVAL: 202 MS
EKG Q-T INTERVAL: 380 MS
EKG QRS DURATION: 96 MS
EKG QTC CALCULATION (BAZETT): 446 MS
EKG R AXIS: -6 DEGREES
EKG T AXIS: 20 DEGREES
EKG VENTRICULAR RATE: 83 BPM
EOSINOPHIL # BLD: 0.05 K/UL (ref 0–0.44)
EOSINOPHILS RELATIVE PERCENT: 1 % (ref 1–4)
ERYTHROCYTE [DISTWIDTH] IN BLOOD BY AUTOMATED COUNT: 13.7 % (ref 11.8–14.4)
GFR, ESTIMATED: >90 ML/MIN/1.73M2
GLUCOSE SERPL-MCNC: 98 MG/DL (ref 74–99)
HCT VFR BLD AUTO: 39.2 % (ref 36.3–47.1)
HGB BLD-MCNC: 12.9 G/DL (ref 11.9–15.1)
IMM GRANULOCYTES # BLD AUTO: 0.03 K/UL (ref 0–0.3)
IMM GRANULOCYTES NFR BLD: 0 %
LYMPHOCYTES NFR BLD: 1.11 K/UL (ref 1.1–3.7)
LYMPHOCYTES RELATIVE PERCENT: 15 % (ref 24–43)
MAGNESIUM SERPL-MCNC: 1.9 MG/DL (ref 1.6–2.4)
MCH RBC QN AUTO: 30.4 PG (ref 25.2–33.5)
MCHC RBC AUTO-ENTMCNC: 32.9 G/DL (ref 28.4–34.8)
MCV RBC AUTO: 92.2 FL (ref 82.6–102.9)
MICROORGANISM SPEC CULT: ABNORMAL
MICROORGANISM SPEC CULT: ABNORMAL
MICROORGANISM/AGENT SPEC: ABNORMAL
MICROORGANISM/AGENT SPEC: ABNORMAL
MONOCYTES NFR BLD: 0.66 K/UL (ref 0.1–1.2)
MONOCYTES NFR BLD: 9 % (ref 3–12)
NEUTROPHILS NFR BLD: 74 % (ref 36–65)
NEUTS SEG NFR BLD: 5.57 K/UL (ref 1.5–8.1)
NRBC BLD-RTO: 0 PER 100 WBC
PLATELET # BLD AUTO: 223 K/UL (ref 138–453)
PMV BLD AUTO: 9.5 FL (ref 8.1–13.5)
POTASSIUM SERPL-SCNC: 3.5 MMOL/L (ref 3.7–5.3)
RBC # BLD AUTO: 4.25 M/UL (ref 3.95–5.11)
SODIUM SERPL-SCNC: 136 MMOL/L (ref 136–145)
SPECIMEN DESCRIPTION: ABNORMAL
WBC OTHER # BLD: 7.5 K/UL (ref 3.5–11.3)

## 2025-06-27 PROCEDURE — 74018 RADEX ABDOMEN 1 VIEW: CPT

## 2025-06-27 PROCEDURE — 2700000000 HC OXYGEN THERAPY PER DAY

## 2025-06-27 PROCEDURE — 85025 COMPLETE CBC W/AUTO DIFF WBC: CPT

## 2025-06-27 PROCEDURE — 99232 SBSQ HOSP IP/OBS MODERATE 35: CPT | Performed by: SURGERY

## 2025-06-27 PROCEDURE — 99232 SBSQ HOSP IP/OBS MODERATE 35: CPT | Performed by: INTERNAL MEDICINE

## 2025-06-27 PROCEDURE — 6370000000 HC RX 637 (ALT 250 FOR IP)

## 2025-06-27 PROCEDURE — 2060000000 HC ICU INTERMEDIATE R&B

## 2025-06-27 PROCEDURE — 86850 RBC ANTIBODY SCREEN: CPT

## 2025-06-27 PROCEDURE — 6360000004 HC RX CONTRAST MEDICATION: Performed by: INTERNAL MEDICINE

## 2025-06-27 PROCEDURE — 6370000000 HC RX 637 (ALT 250 FOR IP): Performed by: SURGERY

## 2025-06-27 PROCEDURE — 6370000000 HC RX 637 (ALT 250 FOR IP): Performed by: INTERNAL MEDICINE

## 2025-06-27 PROCEDURE — 86900 BLOOD TYPING SEROLOGIC ABO: CPT

## 2025-06-27 PROCEDURE — 74176 CT ABD & PELVIS W/O CONTRAST: CPT

## 2025-06-27 PROCEDURE — 36415 COLL VENOUS BLD VENIPUNCTURE: CPT

## 2025-06-27 PROCEDURE — 2500000003 HC RX 250 WO HCPCS: Performed by: ANESTHESIOLOGY

## 2025-06-27 PROCEDURE — 86901 BLOOD TYPING SEROLOGIC RH(D): CPT

## 2025-06-27 PROCEDURE — 93010 ELECTROCARDIOGRAM REPORT: CPT | Performed by: INTERNAL MEDICINE

## 2025-06-27 PROCEDURE — 80048 BASIC METABOLIC PNL TOTAL CA: CPT

## 2025-06-27 PROCEDURE — 2500000003 HC RX 250 WO HCPCS: Performed by: INTERNAL MEDICINE

## 2025-06-27 PROCEDURE — 82330 ASSAY OF CALCIUM: CPT

## 2025-06-27 PROCEDURE — 94640 AIRWAY INHALATION TREATMENT: CPT

## 2025-06-27 PROCEDURE — 83735 ASSAY OF MAGNESIUM: CPT

## 2025-06-27 RX ORDER — SENNA AND DOCUSATE SODIUM 50; 8.6 MG/1; MG/1
2 TABLET, FILM COATED ORAL 2 TIMES DAILY
Status: DISCONTINUED | OUTPATIENT
Start: 2025-06-27 | End: 2025-06-28 | Stop reason: HOSPADM

## 2025-06-27 RX ORDER — POLYETHYLENE GLYCOL 3350 17 G/17G
17 POWDER, FOR SOLUTION ORAL 2 TIMES DAILY
Status: DISCONTINUED | OUTPATIENT
Start: 2025-06-27 | End: 2025-06-28 | Stop reason: HOSPADM

## 2025-06-27 RX ADMIN — IPRATROPIUM BROMIDE AND ALBUTEROL SULFATE 1 DOSE: .5; 2.5 SOLUTION RESPIRATORY (INHALATION) at 08:53

## 2025-06-27 RX ADMIN — SODIUM CHLORIDE, PRESERVATIVE FREE 10 ML: 5 INJECTION INTRAVENOUS at 10:35

## 2025-06-27 RX ADMIN — POLYETHYLENE GLYCOL 3350 17 G: 17 POWDER, FOR SOLUTION ORAL at 21:29

## 2025-06-27 RX ADMIN — SODIUM CHLORIDE, PRESERVATIVE FREE 10 ML: 5 INJECTION INTRAVENOUS at 21:30

## 2025-06-27 RX ADMIN — CALCIUM CARBONATE-CHOLECALCIFEROL TAB 250 MG-125 UNIT 1 TABLET: 250-125 TAB at 10:35

## 2025-06-27 RX ADMIN — TRAZODONE HYDROCHLORIDE 100 MG: 50 TABLET ORAL at 22:56

## 2025-06-27 RX ADMIN — SENNOSIDES AND DOCUSATE SODIUM 2 TABLET: 50; 8.6 TABLET ORAL at 21:27

## 2025-06-27 RX ADMIN — TIOTROPIUM BROMIDE AND OLODATEROL 2 PUFF: 3.124; 2.736 SPRAY, METERED RESPIRATORY (INHALATION) at 08:53

## 2025-06-27 RX ADMIN — HYDROCODONE BITARTRATE AND ACETAMINOPHEN 1 TABLET: 5; 325 TABLET ORAL at 17:50

## 2025-06-27 RX ADMIN — HYDROCODONE BITARTRATE AND ACETAMINOPHEN 1 TABLET: 5; 325 TABLET ORAL at 06:13

## 2025-06-27 RX ADMIN — ATORVASTATIN CALCIUM 40 MG: 40 TABLET, FILM COATED ORAL at 21:27

## 2025-06-27 RX ADMIN — IOHEXOL 50 ML: 240 INJECTION, SOLUTION INTRATHECAL; INTRAVASCULAR; INTRAVENOUS; ORAL at 09:42

## 2025-06-27 RX ADMIN — IPRATROPIUM BROMIDE AND ALBUTEROL SULFATE 1 DOSE: .5; 2.5 SOLUTION RESPIRATORY (INHALATION) at 21:17

## 2025-06-27 RX ADMIN — POTASSIUM CHLORIDE 40 MEQ: 1500 TABLET, EXTENDED RELEASE ORAL at 10:36

## 2025-06-27 RX ADMIN — Medication 1 TABLET: at 10:36

## 2025-06-27 ASSESSMENT — PAIN DESCRIPTION - ORIENTATION
ORIENTATION: LOWER;LEFT
ORIENTATION: LEFT;RIGHT

## 2025-06-27 ASSESSMENT — PAIN DESCRIPTION - LOCATION
LOCATION: LEG
LOCATION: LEG;BACK
LOCATION: ABDOMEN;BACK;NECK

## 2025-06-27 ASSESSMENT — PAIN - FUNCTIONAL ASSESSMENT
PAIN_FUNCTIONAL_ASSESSMENT: ACTIVITIES ARE NOT PREVENTED
PAIN_FUNCTIONAL_ASSESSMENT: ACTIVITIES ARE NOT PREVENTED
PAIN_FUNCTIONAL_ASSESSMENT: PREVENTS OR INTERFERES SOME ACTIVE ACTIVITIES AND ADLS

## 2025-06-27 ASSESSMENT — PAIN SCALES - GENERAL
PAINLEVEL_OUTOF10: 7
PAINLEVEL_OUTOF10: 4
PAINLEVEL_OUTOF10: 7
PAINLEVEL_OUTOF10: 8
PAINLEVEL_OUTOF10: 8

## 2025-06-27 ASSESSMENT — PAIN SCALES - WONG BAKER
WONGBAKER_NUMERICALRESPONSE: NO HURT
WONGBAKER_NUMERICALRESPONSE: NO HURT

## 2025-06-27 ASSESSMENT — PAIN DESCRIPTION - DESCRIPTORS
DESCRIPTORS: ACHING;SORE

## 2025-06-27 NOTE — ANESTHESIA POSTPROCEDURE EVALUATION
Department of Anesthesiology  Postprocedure Note    Patient: Toshia South  MRN: [id number]  YOB: 1945  Date of evaluation: 6/27/2025    Procedure Summary       Date: 06/25/25 Room / Location: [address]    Anesthesia Start: 1902 Anesthesia Stop: 2112    Procedure: **E2** SIGMOIDOSCOPY FLEXIBLE DECOMPRESSION Diagnosis: Volvulus (HCC)    Surgeons: Topher Murray MD Responsible Provider: Don Montemayor MD    Anesthesia Type: general ASA Status: 4            Anesthesia Type: No value filed.    Latoya Phase I: Latoya Score: 8    Latoya Phase II:      Anesthesia Post Evaluation    Patient location during evaluation: PACU  Patient participation: complete - patient participated  Level of consciousness: awake  Pain score: 1  Airway patency: patent  Nausea & Vomiting: no nausea and no vomiting  Cardiovascular status: blood pressure returned to baseline and hemodynamically stable  Respiratory status: acceptable  Hydration status: euvolemic  Pain management: adequate    No notable events documented.

## 2025-06-27 NOTE — PLAN OF CARE
Problem: Chronic Conditions and Co-morbidities  Goal: Patient's chronic conditions and co-morbidity symptoms are monitored and maintained or improved  6/26/2025 2319 by Nahed Tobias  Outcome: Progressing  Flowsheets (Taken 6/26/2025 2035)  Care Plan - Patient's Chronic Conditions and Co-Morbidity Symptoms are Monitored and Maintained or Improved: Monitor and assess patient's chronic conditions and comorbid symptoms for stability, deterioration, or improvement  6/26/2025 1740 by Elisa Welch, RN  Outcome: Progressing     Problem: Discharge Planning  Goal: Discharge to home or other facility with appropriate resources  6/26/2025 2319 by Nahed Tobias  Outcome: Progressing  Flowsheets (Taken 6/26/2025 2035)  Discharge to home or other facility with appropriate resources: Identify barriers to discharge with patient and caregiver  6/26/2025 1740 by Elisa Welch, RN  Outcome: Progressing     Problem: Skin/Tissue Integrity  Goal: Skin integrity remains intact  Description: 1.  Monitor for areas of redness and/or skin breakdown  2.  Assess vascular access sites hourly  3.  Every 4-6 hours minimum:  Change oxygen saturation probe site  4.  Every 4-6 hours:  If on nasal continuous positive airway pressure, respiratory therapy assess nares and determine need for appliance change or resting period  6/26/2025 2319 by Nahed Tobias  Outcome: Progressing  Flowsheets  Taken 6/26/2025 2316  Skin Integrity Remains Intact: Monitor for areas of redness and/or skin breakdown  Taken 6/26/2025 2035  Skin Integrity Remains Intact: Monitor for areas of redness and/or skin breakdown  6/26/2025 1740 by Elisa Welch, RN  Outcome: Progressing     Problem: Safety - Adult  Goal: Free from fall injury  6/26/2025 2319 by Nahed Tobias  Outcome: Progressing  Flowsheets (Taken 6/26/2025 2316)  Free From Fall Injury: Instruct family/caregiver on patient safety  6/26/2025 1740 by Yuri

## 2025-06-27 NOTE — PROGRESS NOTES
[facility]  Internal Medicine Teaching Residency Program  Inpatient Daily Progress Note  ______________________________________________________________________________    Patient: Toshia South  YOB: 1945   MRN:[id number]    Acct: [id number]     Room: [facility]2/0432-02  Admit date: 6/25/2025  Today's date: 06/27/25  Number of days in the hospital: 2  CODE STATUS: Full Code     SUBJECTIVE   Admitting Diagnosis: Volvulus (HCC)  CC: Abdominal pain and mechanical fall   Pt examined at bedside. Chart & results reviewed.   Patient's vitals have been stable, she is saturating on nasal cannula at 3 L.  Patient does use home oxygen.  Patient was complaining of abdominal tenderness around the left lower quadrant today.  She denies any more bowel movements.  Patient to undergo CT abdomen scan then possible sigmoidectomy today    Ros: Negative except for above    BRIEF HISTORY     The patient is a 79 y.o. female with PMHx of   COPD on 4 L of supplemental oxygen at home  Mixed hyperlipidemia  Hypothyroidism  VIPIN  Iron deficiency anemia  Lumbosacral radiculopathy on Lyrica     Patient present to the ED with a chief complaint of fall and abdominal pain.  According the patient she was all right until day before yesterday when she had a fall, she fell on the floor, denied of loss of consciousness, dizziness and she also complained of abdominal pain and constipation denied of melena or hemoptysis.  Currently she is on Xarelto for concern of A-fib        Review of Systems   Constitutional:  Negative for activity change, chills and fever.   Gastrointestinal:  Positive for constipation. Negative for diarrhea, nausea and vomiting.   Genitourinary:  Negative for dysuria.   Psychiatric/Behavioral:  Negative for confusion.          Initial Vitals on Presentation:  Temp: Afebrile, RR: 16, HR 87, /80, SPO2 93% on 4 L of oxygen     Initial Course in the ED:   - Patient initially

## 2025-06-27 NOTE — PROGRESS NOTES
PROGRESS NOTE  ([place] 2.0)        PATIENT NAME: Toshia South  MEDICAL RECORD NO. [id number]  DATE: 6/27/2025  SURGEON: Abril  PRIMARY CARE PHYSICIAN: Jong Moraes MD    HD: # 2    ASSESSMENT    Patient Active Problem List   Diagnosis    Hypothyroidism    Major depressive disorder    Chronic midline low back pain without sciatica    Iron deficiency anemia    COPD (chronic obstructive pulmonary disease) (HCC)    Anemia    PAF (paroxysmal atrial fibrillation) (HCC)    IPF (idiopathic pulmonary fibrosis) (HCC)    History of subdural hematoma    GERD (gastroesophageal reflux disease)    Oxygen dependent    Dysphagia, oropharyngeal    Zenker diverticulum    Chronic pain    Secondary hypercoagulable state    NSTEMI (non-ST elevated myocardial infarction) (HCC)    Chronic respiratory failure with hypoxia (J96.11)    Ulcer of left pretibial region with fat layer exposed (HCC)    Atherosclerotic peripheral vascular disease with rest pain (HCC)    Critical limb ischemia of both lower extremities with rest pain (HCC)    Volvulus (HCC)    Colon distention    Abdominal pain    Coronary artery disease involving native coronary artery of native heart without angina pectoris    Pre-op evaluation    Sigmoid volvulus ([facility])       MEDICAL DECISION MAKING AND PLAN    Redundant colon, no current evidence of volvulus  Mechanical bowel prep was given yesterday and patient had 6 bowel movements yesterday  CT with rectal contrast was done today and did not show any sigmoid volvulus.  We will cancel the surgery today.  Okay to give GI soft diet  Discontinue rectal tube  Okay to give bowel regimen  Okay to ambulate and work with PT OT  Rest of the care per primary  General Surgery will follow        SUBJECTIVE    Toshia South is has significantly improved since yesterday. Passing large bowel movements.  CT with rectal contrast did not show any sigmoid volvulus.      OBJECTIVE  VITALS: Temp: Temp: 98.6 °F (37 °C)Temp

## 2025-06-28 VITALS
HEART RATE: 79 BPM | DIASTOLIC BLOOD PRESSURE: 72 MMHG | OXYGEN SATURATION: 92 % | SYSTOLIC BLOOD PRESSURE: 146 MMHG | TEMPERATURE: 97.8 F | RESPIRATION RATE: 14 BRPM

## 2025-06-28 LAB
ANION GAP SERPL CALCULATED.3IONS-SCNC: 13 MMOL/L (ref 9–16)
BASOPHILS # BLD: 0.03 K/UL (ref 0–0.2)
BASOPHILS NFR BLD: 0 % (ref 0–2)
BUN SERPL-MCNC: 7 MG/DL (ref 8–23)
CA-I BLD-SCNC: 1.23 MMOL/L (ref 1.13–1.33)
CALCIUM SERPL-MCNC: 9.7 MG/DL (ref 8.6–10.4)
CHLORIDE SERPL-SCNC: 99 MMOL/L (ref 98–107)
CO2 SERPL-SCNC: 26 MMOL/L (ref 20–31)
CREAT SERPL-MCNC: 0.5 MG/DL (ref 0.6–0.9)
EOSINOPHIL # BLD: 0.05 K/UL (ref 0–0.44)
EOSINOPHILS RELATIVE PERCENT: 1 % (ref 1–4)
ERYTHROCYTE [DISTWIDTH] IN BLOOD BY AUTOMATED COUNT: 14.1 % (ref 11.8–14.4)
GFR, ESTIMATED: >90 ML/MIN/1.73M2
GLUCOSE SERPL-MCNC: 128 MG/DL (ref 74–99)
HCT VFR BLD AUTO: 39.9 % (ref 36.3–47.1)
HGB BLD-MCNC: 12.6 G/DL (ref 11.9–15.1)
IMM GRANULOCYTES # BLD AUTO: <0.03 K/UL (ref 0–0.3)
IMM GRANULOCYTES NFR BLD: 0 %
LYMPHOCYTES NFR BLD: 1.03 K/UL (ref 1.1–3.7)
LYMPHOCYTES RELATIVE PERCENT: 15 % (ref 24–43)
MAGNESIUM SERPL-MCNC: 1.9 MG/DL (ref 1.6–2.4)
MCH RBC QN AUTO: 30.3 PG (ref 25.2–33.5)
MCHC RBC AUTO-ENTMCNC: 31.6 G/DL (ref 28.4–34.8)
MCV RBC AUTO: 95.9 FL (ref 82.6–102.9)
MONOCYTES NFR BLD: 0.85 K/UL (ref 0.1–1.2)
MONOCYTES NFR BLD: 12 % (ref 3–12)
NEUTROPHILS NFR BLD: 72 % (ref 36–65)
NEUTS SEG NFR BLD: 5.06 K/UL (ref 1.5–8.1)
NRBC BLD-RTO: 0 PER 100 WBC
PLATELET # BLD AUTO: 225 K/UL (ref 138–453)
PMV BLD AUTO: 10.1 FL (ref 8.1–13.5)
POTASSIUM SERPL-SCNC: 4 MMOL/L (ref 3.7–5.3)
RBC # BLD AUTO: 4.16 M/UL (ref 3.95–5.11)
SODIUM SERPL-SCNC: 138 MMOL/L (ref 136–145)
WBC OTHER # BLD: 7 K/UL (ref 3.5–11.3)

## 2025-06-28 PROCEDURE — 6370000000 HC RX 637 (ALT 250 FOR IP): Performed by: SURGERY

## 2025-06-28 PROCEDURE — 6370000000 HC RX 637 (ALT 250 FOR IP)

## 2025-06-28 PROCEDURE — 99233 SBSQ HOSP IP/OBS HIGH 50: CPT | Performed by: NURSE PRACTITIONER

## 2025-06-28 PROCEDURE — 6370000000 HC RX 637 (ALT 250 FOR IP): Performed by: NURSE PRACTITIONER

## 2025-06-28 PROCEDURE — 2500000003 HC RX 250 WO HCPCS: Performed by: INTERNAL MEDICINE

## 2025-06-28 PROCEDURE — 99231 SBSQ HOSP IP/OBS SF/LOW 25: CPT | Performed by: SURGERY

## 2025-06-28 PROCEDURE — 85025 COMPLETE CBC W/AUTO DIFF WBC: CPT

## 2025-06-28 PROCEDURE — 80048 BASIC METABOLIC PNL TOTAL CA: CPT

## 2025-06-28 PROCEDURE — 2500000003 HC RX 250 WO HCPCS: Performed by: ANESTHESIOLOGY

## 2025-06-28 PROCEDURE — 82330 ASSAY OF CALCIUM: CPT

## 2025-06-28 PROCEDURE — 99239 HOSP IP/OBS DSCHRG MGMT >30: CPT | Performed by: INTERNAL MEDICINE

## 2025-06-28 PROCEDURE — 83735 ASSAY OF MAGNESIUM: CPT

## 2025-06-28 PROCEDURE — 36415 COLL VENOUS BLD VENIPUNCTURE: CPT

## 2025-06-28 RX ORDER — DIGOXIN 125 MCG
125 TABLET ORAL DAILY
Status: DISCONTINUED | OUTPATIENT
Start: 2025-06-28 | End: 2025-06-28 | Stop reason: HOSPADM

## 2025-06-28 RX ORDER — POLYETHYLENE GLYCOL 3350 17 G/17G
17 POWDER, FOR SOLUTION ORAL 2 TIMES DAILY
Qty: 527 G | Refills: 1 | Status: SHIPPED | OUTPATIENT
Start: 2025-06-28 | End: 2025-07-28

## 2025-06-28 RX ORDER — SENNA AND DOCUSATE SODIUM 50; 8.6 MG/1; MG/1
2 TABLET, FILM COATED ORAL 2 TIMES DAILY
Qty: 56 TABLET | Refills: 0 | OUTPATIENT
Start: 2025-06-28 | End: 2025-07-12

## 2025-06-28 RX ORDER — DILTIAZEM HYDROCHLORIDE 120 MG/1
120 CAPSULE, COATED, EXTENDED RELEASE ORAL DAILY
Status: DISCONTINUED | OUTPATIENT
Start: 2025-06-28 | End: 2025-06-28 | Stop reason: HOSPADM

## 2025-06-28 RX ORDER — SENNA AND DOCUSATE SODIUM 50; 8.6 MG/1; MG/1
2 TABLET, FILM COATED ORAL 2 TIMES DAILY
Qty: 120 TABLET | Refills: 0 | Status: SHIPPED | OUTPATIENT
Start: 2025-06-28 | End: 2025-07-28

## 2025-06-28 RX ADMIN — SODIUM CHLORIDE, PRESERVATIVE FREE 20 ML: 5 INJECTION INTRAVENOUS at 09:51

## 2025-06-28 RX ADMIN — SPIRONOLACTONE 50 MG: 25 TABLET, FILM COATED ORAL at 09:47

## 2025-06-28 RX ADMIN — DILTIAZEM HYDROCHLORIDE 120 MG: 120 CAPSULE, COATED, EXTENDED RELEASE ORAL at 09:50

## 2025-06-28 RX ADMIN — SODIUM CHLORIDE, PRESERVATIVE FREE 10 ML: 5 INJECTION INTRAVENOUS at 09:52

## 2025-06-28 RX ADMIN — BUPROPION HYDROCHLORIDE 300 MG: 150 TABLET, EXTENDED RELEASE ORAL at 09:49

## 2025-06-28 RX ADMIN — BUMETANIDE 2 MG: 1 TABLET ORAL at 09:49

## 2025-06-28 RX ADMIN — CITALOPRAM HYDROBROMIDE 40 MG: 20 TABLET ORAL at 09:50

## 2025-06-28 RX ADMIN — HYDROCODONE BITARTRATE AND ACETAMINOPHEN 1 TABLET: 5; 325 TABLET ORAL at 09:47

## 2025-06-28 RX ADMIN — Medication 1 TABLET: at 09:48

## 2025-06-28 RX ADMIN — DIGOXIN 125 MCG: 125 TABLET ORAL at 09:50

## 2025-06-28 RX ADMIN — HYDROCODONE BITARTRATE AND ACETAMINOPHEN 1 TABLET: 5; 325 TABLET ORAL at 02:48

## 2025-06-28 RX ADMIN — CALCIUM CARBONATE-CHOLECALCIFEROL TAB 250 MG-125 UNIT 1 TABLET: 250-125 TAB at 09:48

## 2025-06-28 ASSESSMENT — PAIN DESCRIPTION - LOCATION
LOCATION: LEG;BACK
LOCATION: BACK;LEG

## 2025-06-28 ASSESSMENT — PAIN DESCRIPTION - ORIENTATION
ORIENTATION: RIGHT;LEFT
ORIENTATION: LEFT;RIGHT

## 2025-06-28 ASSESSMENT — ENCOUNTER SYMPTOMS
CONSTIPATION: 0
DIARRHEA: 0
ABDOMINAL PAIN: 0

## 2025-06-28 ASSESSMENT — PAIN SCALES - GENERAL
PAINLEVEL_OUTOF10: 4
PAINLEVEL_OUTOF10: 4
PAINLEVEL_OUTOF10: 6
PAINLEVEL_OUTOF10: 7

## 2025-06-28 ASSESSMENT — PAIN SCALES - WONG BAKER: WONGBAKER_NUMERICALRESPONSE: HURTS A LITTLE BIT

## 2025-06-28 ASSESSMENT — PAIN DESCRIPTION - DESCRIPTORS: DESCRIPTORS: ACHING;PRESSURE;SORE

## 2025-06-28 NOTE — CARE COORDINATION
Case Management   Daily Progress Note       Patient Name: Toshia South                   YOB: 1945  Diagnosis: Volvulus (HCC) [K56.2]  Sigmoid volvulus (HCC) [K56.2]                       GMLOS: 4 days  Length of Stay: 3  days    Anticipated Discharge Date: Ready for discharge    Readmission Risk (Low < 19, Mod (19-27), High > 27): Readmission Risk Score: 20.8        Current Transitional Plan    [x] Home Independently    [] Home with HC    [] Skilled Nursing Facility    [] Acute Rehabilitation    [] Long Term Acute Care (LTAC)    [] Other:     Plan for the Stay (Medical Management) : None          Workflow Continuation (Additional Notes) : Plan is to return home independent with  and son, denies any needs     Discharge Report    [facility]  Clinical Case Management Department  Written by: Lucie Malagon RN    Patient Name: Toshia South  Attending Provider: Chuckie Lockhart MD  Admit Date: 2025  4:33 PM  MRN: [id number]  Account: [id number]                     : 1945  Discharge Date:       Disposition: home      Lucie Malagon RN  2025

## 2025-06-28 NOTE — PROGRESS NOTES
[facility]  Internal Medicine Teaching Residency Program  Inpatient Daily Progress Note  ______________________________________________________________________________    Patient: Toshia South  YOB: 1945   MRN:[id number]    Acct: [id number]     Room: [facility]2/0432-02  Admit date: 6/25/2025  Today's date: 06/28/25  Number of days in the hospital: 3  CODE STATUS: Full Code     SUBJECTIVE   Admitting Diagnosis: Volvulus (HCC)  CC: Abdominal pain and mechanical fall   Pt examined at bedside. Chart & results reviewed.     -Overnight events: No acute events overnight  -Current hemodynamic status: Hemodynamically stable, mainataining oxygen saturation at 3 L via nasal prongs.  -Urine output: 150 mL and 3 unmeasured urine occurances over 24 hours    Morning labs: pending .  Diet: ADULT DIET; Easy to Chew     Review of Systems   Constitutional:  Negative for chills and fever.   Cardiovascular:  Negative for chest pain.   Gastrointestinal:  Negative for abdominal pain, constipation and diarrhea.   Psychiatric/Behavioral:  Negative for confusion.         Specialty recommendations:  General Surgery: For sigmoid volvulus, sigmoidectomy is deferred due to low suspicion of volvulus and being moderate risk for surgery    GI: for flexible sigmoidoscopy    Brief internal medicine plan:  Discharge pending general surgery clearance    BRIEF HISTORY     The patient is a 79 y.o. female with PMHx of   COPD on 4 L of supplemental oxygen at home  Mixed hyperlipidemia  Hypothyroidism  VIPIN  Iron deficiency anemia  Lumbosacral radiculopathy on Lyrica     Patient present to the ED with a chief complaint of fall and abdominal pain.  According the patient she was all right until day before yesterday when she had a fall, she fell on the floor, denied of loss of consciousness, dizziness and she also complained of abdominal pain and constipation denied of melena or hemoptysis.  Currently

## 2025-06-28 NOTE — PROGRESS NOTES
[facility]   Progress Note                   Date:   6/28/2025  Patient name: Toshia South  Date of admission:  6/25/2025  4:33 PM  MRN:   [id number]  YOB: 1945  PCP: Jong Moraes MD    Reason for Admission: Volvulus (HCC) [K56.2]  Sigmoid volvulus (HCC) [K56.2]    Subjective:       Clinical Changes / Abnormalities: Pt seen and examined in the room.  Patient resting in bed. Pt denies any CP or sob.  Labs, vitals and tele reviewed- SR 77   Patient reports that she is going home today    Medications:   Scheduled Meds:   potassium bicarb-citric acid  20 mEq Oral Once    polyethylene glycol  17 g Oral BID    sennosides-docusate sodium  2 tablet Oral BID    acetaminophen  1,000 mg Oral 3 times per day    traZODone  100 mg Oral Nightly    sodium chloride flush  5-40 mL IntraVENous 2 times per day    sodium chloride flush  5-40 mL IntraVENous 2 times per day    atorvastatin  40 mg Oral Nightly    [Held by provider] bumetanide  2 mg Oral BID    [Held by provider] buPROPion  300 mg Oral QAM    calcium carb-cholecalciferol  1 tablet Oral Daily    [Held by provider] citalopram  40 mg Oral Daily    ipratropium 0.5 mg-albuterol 2.5 mg  1 Dose Inhalation 4x Daily RT    levothyroxine  200 mcg Oral Daily    therapeutic multivitamin-minerals  1 tablet Oral Daily    [Held by provider] pregabalin  300 mg Oral BID    [Held by provider] spironolactone  50 mg Oral Daily    tiotropium-olodaterol  2 puff Inhalation Daily    [Held by provider] rivaroxaban  20 mg Oral Daily     Continuous Infusions:   sodium chloride      sodium chloride       CBC:   Recent Labs     06/25/25  0958 06/27/25  0539 06/28/25  0354   WBC 5.5 7.5 7.0   HGB 12.7 12.9 12.6    223 225     BMP:    Recent Labs     06/25/25  0958 06/27/25  0539    136   K 3.8 3.5*   CL 98 100   CO2 30 22   BUN 15 8   CREATININE 0.7 0.5*   GLUCOSE 98 98     Hepatic:   Recent Labs     06/26/25  1319   AST 30   ALT 23   BILITOT 0.6

## 2025-06-28 NOTE — PROGRESS NOTES
PROGRESS NOTE          PATIENT NAME: Toshia South  MEDICAL RECORD NO. [id number]  DATE: 6/28/2025    HD: # 3      DIAGNOSIS AND PLAN    79 y.o. female concern for partial volvulus    Patient tolerating diet, having bowel function  No acute surgical intervention indicated at this time  General surgery to sign off please call back with questions or concerns  Medical management per primary    Chief Complaint: \"I feel great\"    SUBJECTIVE    Patient seen and examined at bedside.  She has remained afebrile vital signs stable.  She denies any abdominal pain, nausea, vomiting.  She is tolerating a regular diet, having bowel function including passing gas and having bowel movements.    OBJECTIVE  VITALS:   Vitals:    06/28/25 1119   BP: (!) 146/72   Pulse: 79   Resp: 14   Temp: 97.8 °F (36.6 °C)   SpO2: 92%     Physical Exam  Constitutional:       General: She is not in acute distress.     Appearance: She is not ill-appearing or toxic-appearing.   HENT:      Head: Normocephalic and atraumatic.      Nose: Nose normal.      Mouth/Throat:      Mouth: Mucous membranes are moist.   Eyes:      Extraocular Movements: Extraocular movements intact.   Cardiovascular:      Rate and Rhythm: Normal rate.   Pulmonary:      Effort: Pulmonary effort is normal. No respiratory distress.   Abdominal:      General: There is no distension.      Palpations: Abdomen is soft.      Tenderness: There is no abdominal tenderness. There is no guarding or rebound.   Skin:     General: Skin is warm and dry.   Neurological:      General: No focal deficit present.      Mental Status: She is alert and oriented to person, place, and time.   Psychiatric:         Mood and Affect: Mood normal.         Behavior: Behavior normal.           LAB:  CBC:   Recent Labs     06/27/25  0539 06/28/25  0354   WBC 7.5 7.0   HGB 12.9 12.6   HCT 39.2 39.9   MCV 92.2 95.9    225     BMP:   Recent Labs     06/27/25  0539 06/28/25  1035    138   K 3.5* 4.0

## 2025-06-28 NOTE — PLAN OF CARE
Problem: Chronic Conditions and Co-morbidities  Goal: Patient's chronic conditions and co-morbidity symptoms are monitored and maintained or improved  6/28/2025 0320 by Jenn Lee RN  Outcome: Progressing     Problem: Discharge Planning  Goal: Discharge to home or other facility with appropriate resources  6/28/2025 0320 by Jenn Lee RN  Outcome: Progressing     Problem: Skin/Tissue Integrity  Goal: Skin integrity remains intact  Description: 1.  Monitor for areas of redness and/or skin breakdown  2.  Assess vascular access sites hourly  3.  Every 4-6 hours minimum:  Change oxygen saturation probe site  4.  Every 4-6 hours:  If on nasal continuous positive airway pressure, respiratory therapy assess nares and determine need for appliance change or resting period  6/28/2025 0320 by Jenn Lee RN  Outcome: Progressing     Problem: Safety - Adult  Goal: Free from fall injury  6/28/2025 0320 by Jenn Lee RN  Outcome: Progressing     Problem: ABCDS Injury Assessment  Goal: Absence of physical injury  6/28/2025 0320 by Jenn Lee RN  Outcome: Progressing     Problem: Pain  Goal: Verbalizes/displays adequate comfort level or baseline comfort level  6/28/2025 0320 by Jenn Lee RN  Outcome: Progressing

## 2025-06-28 NOTE — DISCHARGE INSTRUCTIONS
You were admitted and treated for belly pain.  You were evaluated by general surgeon and found to have small bowel obstruction and therefore you were evaluated by GI for scope.  With the help of sigmoidoscopy bowel decompression was done and obstruction was resolved  Therefore please follow-up with general surgery at outpatient clinic  You are at risk of recurrence of bowel obstruction therefore please start taking stool softeners twice daily in case if you do not have a bowel movement daily  You are also taking Norco which is likely contributing factor to bowel obstruction by slowing the bowel movements therefore only take Norco if extremely required and take it in minimal amount.  Bowel blockage (obstruction) may be prevented by doing several things. Try eating smaller meals more often throughout the day. Chew your food very well. Try to chew each bite until it is liquid. Avoid high-fiber foods and raw fruits and vegetables. These may cause another blockage.  Please follow-up with your PCP for close monitoring and adjustment of pain management drugs.  Please follow-up with your cardiologist at [facility]

## 2025-06-28 NOTE — PLAN OF CARE
Problem: Discharge Planning  Goal: Discharge to home or other facility with appropriate resources  6/28/2025 1353 by Fortunato Shah RN  Outcome: Progressing  6/28/2025 0320 by Jenn Lee RN  Outcome: Progressing     Problem: Skin/Tissue Integrity  Goal: Skin integrity remains intact  Description: 1.  Monitor for areas of redness and/or skin breakdown  2.  Assess vascular access sites hourly  3.  Every 4-6 hours minimum:  Change oxygen saturation probe site  4.  Every 4-6 hours:  If on nasal continuous positive airway pressure, respiratory therapy assess nares and determine need for appliance change or resting period  6/28/2025 1353 by Fortunato Shah, RN  Outcome: Progressing  6/28/2025 0320 by Jenn Lee RN  Outcome: Progressing     Problem: Safety - Adult  Goal: Free from fall injury  6/28/2025 1353 by Fortunato Shah RN  Outcome: Progressing  6/28/2025 0320 by Jenn Lee RN  Outcome: Progressing

## 2025-06-28 NOTE — DISCHARGE SUMMARY
Patient discharged from room 432. PIV's removed. All possessions have been gathered by family. Patient was escorted outside in a wheelchair to private vehicle home.AVS hourly explained with no further questions at this time.

## 2025-06-29 NOTE — PROGRESS NOTES
CLINICAL PHARMACY NOTE: MEDS TO BEDS    Total # of Prescriptions Filled: 2   The following medications were delivered to the patient:  Polyethylene glycol powd   Stool softener / lax 50-8.6 tabs    Additional Documentation:  Delivered x2 to patient +2 room 432 on 6/28 at 1:20p. $9.91 paid w card.

## 2025-06-29 NOTE — DISCHARGE SUMMARY
[facility]     Department of Internal Medicine - Staff Internal Medicine Teaching Service    INPATIENT DISCHARGE SUMMARY      Patient Identification:  Toshia South is a 79 y.o. female.  :  1945  MRN: [id number]     Acct: [id number]   PCP: Jong Moraes MD  Admit Date:  2025  Discharge date and time: 2025  1:57 PM   Attending Provider: No att. providers found                                     ACTIVE DISCHARGE DIAGNOSES     Hospital Problem Lists:  Principal Problem:    Volvulus (HCC)  Active Problems:    Coronary artery disease involving native coronary artery of native heart without angina pectoris    Pre-op evaluation    GERD (gastroesophageal reflux disease)    Hypothyroidism    COPD (chronic obstructive pulmonary disease) (HCC)    PAF (paroxysmal atrial fibrillation) (HCC)    Colon distention    Abdominal pain    Sigmoid volvulus (HCC)  Resolved Problems:    * No resolved hospital problems. *      HOSPITAL STAY     Brief Inpatient course:   Toshia South is a 79 y.o. female with PMHx of   COPD on 4 L of supplemental oxygen at home  Mixed hyperlipidemia  Hypothyroidism  VIPIN  Iron deficiency anemia  Lumbosacral radiculopathy on Lyrica  who was admitted for the management of Volvulus (HCC), presented to the emergency department with mechanical fall and abdominal pain.     Brief inpatient course:  Patient had a mechanical fall however on initial arrival patient complained of abdominal pain going on for 3 days and no bowel movements, she was additionally brought to [place] ED and held CT abdomen and pelvis was concerning for volvulus and patient was transferred for [place] for further evaluation, on arrival patient was hemodynamically stable, general surgery was consulted and recommended GI for flexible sigmoidoscopy, patient underwent flexible sigmoidoscopy and after decompression patient had a bowel movement, general surgery recommended rectal

## 2025-06-30 ENCOUNTER — CARE COORDINATION (OUTPATIENT)
Dept: CARE COORDINATION | Age: 80
End: 2025-06-30

## 2025-06-30 DIAGNOSIS — K56.2 VOLVULUS (HCC): Primary | ICD-10-CM

## 2025-06-30 PROCEDURE — 1111F DSCHRG MED/CURRENT MED MERGE: CPT | Performed by: INTERNAL MEDICINE

## 2025-06-30 NOTE — CARE COORDINATION
Care Transitions Note    Initial Call - Call within 2 business days of discharge: Yes    Patient Current Location:  Home: [address] [address]    Care Transition Nurse contacted the patient by telephone to perform post hospital discharge assessment, verified name and  as identifiers.  Provided introduction to self, and explanation of the Care Transition Nurse role.    Patient: Toshia South    Patient : 1945   MRN: [id number]    Reason for Admission: Sigmoid volvulus  Discharge Date: 25  RURS: Readmission Risk Score: 20.6      Last Discharge Facility       Date Complaint Diagnosis Description Type Department Provider    25 Abdominal Pain Sigmoid volvulus (HCC) ED to Hosp-Admission (Discharged) (ADMITTED) [address] Chuckie Lockhart MD; Nesha Munoz...    25 Fall Lower abdominal pain ... ED (TRANSFER) [facility] ED Adamaris Roberts, DO            Was this an external facility discharge? No    Additional needs identified to be addressed with provider   High priority: routed message to PCP office for HFU appointment             Method of communication with provider: chart routing.    Patients top risk factors for readmission: functional physical ability, lack of knowledge about disease, and medical condition-COPD, CHF, pulmonary fibrosis    Interventions to address risk factors:   Review of patient management of conditions/medications: discharge    Care Summary Note: Was able to contact Toshia for initial transitional outreach.  She went to the hospital after having a fall and for having abdominal pain for 3 days without having a BM.  [facility] for possible volvulus.  After bowel decompression a volvulus was ruled out and she had a bowel movement.  Toshia said that she was doing \"good\".  She said that she is having bowel movements every day and they were soft brown.  She denied any further abdominal pain and no fever/chills.  She said that her appetite was better and

## 2025-07-07 ENCOUNTER — HOSPITAL ENCOUNTER (OUTPATIENT)
Dept: VASCULAR LAB | Age: 80
Discharge: HOME | End: 2025-07-09
Payer: MEDICARE

## 2025-07-07 DIAGNOSIS — L97.822 ULCER OF LEFT PRETIBIAL REGION WITH FAT LAYER EXPOSED (HCC): ICD-10-CM

## 2025-07-07 DIAGNOSIS — I70.229 ATHEROSCLEROTIC PERIPHERAL VASCULAR DISEASE WITH REST PAIN (HCC): ICD-10-CM

## 2025-07-07 DIAGNOSIS — I70.223: ICD-10-CM

## 2025-07-07 LAB
VAS LEFT ABI: 1.17
VAS LEFT ARM BP: 112 MMHG
VAS LEFT CALF PRESSURE: 133 MMHG
VAS LEFT DORSALIS PEDIS BP: 126 MMHG
VAS LEFT LOW THIGH PRESSURE: 105 MMHG
VAS LEFT PTA BP: 131 MMHG
VAS LEFT TBI: 0.86
VAS LEFT TOE PRESSURE: 96 MMHG
VAS RIGHT ABI: 1.21
VAS RIGHT ARM BP: 110 MMHG
VAS RIGHT CALF PRESSURE: 133 MMHG
VAS RIGHT DORSALIS PEDIS BP: 135 MMHG
VAS RIGHT LOW THIGH PRESSURE: 115 MMHG
VAS RIGHT PTA BP: 124 MMHG
VAS RIGHT TBI: 0.88
VAS RIGHT TOE PRESSURE: 98 MMHG

## 2025-07-07 PROCEDURE — 93923 UPR/LXTR ART STDY 3+ LVLS: CPT

## 2025-07-08 ENCOUNTER — CARE COORDINATION (OUTPATIENT)
Dept: CARE COORDINATION | Age: 80
End: 2025-07-08

## 2025-07-08 NOTE — CARE COORDINATION
Care Transitions Note    Follow Up Call     Attempted to reach patient, spouse/partner  for transitions of care follow up.  Unable to reach patient.      Outreach Attempts:   1st attempt    Care Summary Note: Attempted to contact patient for a follow up care transitions call. Her  answered and stated that she was \"better\" and said they were out to lunch before her PCP appt and couldn't talk now. He requested a return call in a couple of days.     Follow Up Appointment:   Future Appointments         Provider Specialty Dept Phone    7/8/2025 1:45 PM Jong Moraes MD Internal Medicine [phone]    7/10/2025 9:00 AM Ricky Hendrickson DPM Wound Ostomy [phone]    7/24/2025 10:40 AM Jose Miner MD Cardiology [phone]    10/6/2025 1:00 PM Hitesh Arroyo DO Pulmonology [phone]    11/24/2025 10:00 AM Jong Moraes MD Internal Medicine [phone]    5/19/2026 9:30 AM Jogn Moraes MD Internal Medicine [phone]            Plan for follow-up call in 2-5 days based on severity of symptoms and risk factors. Plan for next call:  symptom management-follow up on any constipation/abdomina pain  follow-up appointment-did PCP office make HFU appointment        Sury Lynn LPN

## 2025-07-10 ENCOUNTER — HOSPITAL ENCOUNTER (OUTPATIENT)
Dept: WOUND CARE | Age: 80
Discharge: HOME | End: 2025-07-10
Payer: MEDICARE

## 2025-07-10 ENCOUNTER — CARE COORDINATION (OUTPATIENT)
Dept: CARE COORDINATION | Age: 80
End: 2025-07-10

## 2025-07-10 VITALS
SYSTOLIC BLOOD PRESSURE: 151 MMHG | RESPIRATION RATE: 18 BRPM | WEIGHT: 136.6 LBS | TEMPERATURE: 97.8 F | HEART RATE: 90 BPM | BODY MASS INDEX: 22.73 KG/M2 | DIASTOLIC BLOOD PRESSURE: 75 MMHG

## 2025-07-10 DIAGNOSIS — L97.512 CHRONIC ULCER OF GREAT TOE OF RIGHT FOOT WITH FAT LAYER EXPOSED (HCC): ICD-10-CM

## 2025-07-10 DIAGNOSIS — L97.822 ULCER OF LEFT PRETIBIAL REGION WITH FAT LAYER EXPOSED (HCC): Primary | ICD-10-CM

## 2025-07-10 PROCEDURE — 11042 DBRDMT SUBQ TIS 1ST 20SQCM/<: CPT

## 2025-07-10 RX ORDER — GINSENG 100 MG
CAPSULE ORAL PRN
OUTPATIENT
Start: 2025-07-10

## 2025-07-10 RX ORDER — GENTAMICIN SULFATE 1 MG/G
OINTMENT TOPICAL PRN
OUTPATIENT
Start: 2025-07-10

## 2025-07-10 RX ORDER — SILVER SULFADIAZINE 10 MG/G
CREAM TOPICAL PRN
OUTPATIENT
Start: 2025-07-10

## 2025-07-10 RX ORDER — GENTAMICIN SULFATE 1 MG/G
OINTMENT TOPICAL PRN
Status: CANCELLED | OUTPATIENT
Start: 2025-07-10

## 2025-07-10 RX ORDER — MUPIROCIN 2 %
OINTMENT (GRAM) TOPICAL PRN
Status: CANCELLED | OUTPATIENT
Start: 2025-07-10

## 2025-07-10 RX ORDER — BACITRACIN ZINC AND POLYMYXIN B SULFATE 500; 1000 [USP'U]/G; [USP'U]/G
OINTMENT TOPICAL PRN
Status: CANCELLED | OUTPATIENT
Start: 2025-07-10

## 2025-07-10 RX ORDER — NEOMYCIN/BACITRACIN/POLYMYXINB 3.5-400-5K
OINTMENT (GRAM) TOPICAL PRN
OUTPATIENT
Start: 2025-07-10

## 2025-07-10 RX ORDER — NEOMYCIN/BACITRACIN/POLYMYXINB 3.5-400-5K
OINTMENT (GRAM) TOPICAL PRN
Status: CANCELLED | OUTPATIENT
Start: 2025-07-10

## 2025-07-10 RX ORDER — GINSENG 100 MG
CAPSULE ORAL PRN
Status: CANCELLED | OUTPATIENT
Start: 2025-07-10

## 2025-07-10 RX ORDER — SODIUM CHLOR/HYPOCHLOROUS ACID 0.033 %
SOLUTION, IRRIGATION IRRIGATION PRN
OUTPATIENT
Start: 2025-07-10

## 2025-07-10 RX ORDER — SODIUM CHLOR/HYPOCHLOROUS ACID 0.033 %
SOLUTION, IRRIGATION IRRIGATION PRN
Status: CANCELLED | OUTPATIENT
Start: 2025-07-10

## 2025-07-10 RX ORDER — SILVER SULFADIAZINE 10 MG/G
CREAM TOPICAL PRN
Status: CANCELLED | OUTPATIENT
Start: 2025-07-10

## 2025-07-10 RX ORDER — BACITRACIN ZINC AND POLYMYXIN B SULFATE 500; 1000 [USP'U]/G; [USP'U]/G
OINTMENT TOPICAL PRN
OUTPATIENT
Start: 2025-07-10

## 2025-07-10 RX ORDER — MUPIROCIN 2 %
OINTMENT (GRAM) TOPICAL PRN
OUTPATIENT
Start: 2025-07-10

## 2025-07-10 ASSESSMENT — PAIN SCALES - GENERAL: PAINLEVEL_OUTOF10: 3

## 2025-07-10 NOTE — DISCHARGE INSTRUCTIONS
Wound Management Patient Discharge Instructions    CALL [phone] for questions regarding care of your wounds.  USUAL OFFICE HOURS ARE TUESDAYS MORNINGS AND THURSDAYS(9-2:30) and subject to change without notice     PLEASE ARRIVE PRIOR TO APPOINTMENT TIME TO COMPLETE REGISTRATION PROCESS        Discharge instructions for the patient's plan of care.    Wound care: Right great toe, left great toe, left lower leg  Cleanse with mild soap and water pat dry at dressing change time.  Continue to apply silvadene cream to areas daily cover with dry dressing.  Return to clinic Thursday 7/31/25 at 10:00 AM    Next appointment:  Future Appointments   Date Time Provider Department Center   7/24/2025 10:40 AM Jose Miner MD TIFF CARD [facility]   7/31/2025 10:00 AM Ricky Hendrickson DPM [facility] WND [place]   10/6/2025  1:00 PM [facility]   11/24/2025 10:00 AM Jong Moraes MD [facility]   5/19/2026  9:30 AM Jong Moraes MD [facility] C.MYRNA. Seaminesh M       Your wound care supplies were ordered from [facility], unless otherwise stated.  If you do not receive them in 3 days call them at [phone].      SURVEY:    You may be receiving a survey from [facility] regarding your visit today.    Please complete the survey to enable us to provide the highest quality of care to you and your family.    If you cannot score us a very good on any question, please call the office to discuss how we could have made your experience a very good one.    Thank you. Ben Ovalle,RN, Bisi Proctor,RN and Ruthann Molina RN      REMINDER:  You will receive 2 bills for each visit.  One is a professional fee charge for the physician and the other is a facility fee for the hospital.

## 2025-07-10 NOTE — PROGRESS NOTES
[facility]   Progress Note and Procedure Note      Toshia South  MEDICAL RECORD NUMBER:  [id number]  AGE: 80 y.o.   GENDER: female  : 1945  EPISODE DATE:  7/10/2025    Subjective:     Chief Complaint   Patient presents with    Wound Check     Left lower leg, bilateral feet         HISTORY of PRESENT ILLNESS HPI     Toshia South is a 80 y.o. female who presents today for wound/ulcer evaluation.   History of Wound Context: ***  Wound/Ulcer Pain Timing/Severity: {PAIN ASSESSMENT:}  Quality of pain: {PAIN QUALITY:}  Severity:  {NUMBERS 0-10:} / 10   Modifying Factors: {Modifying Factors:363206938}  Associated Signs/Symptoms: {ASSOCIATED SYMPTOMS:234437654}    Ulcer Identification:  Ulcer Type: {WOUND TYPE:298708773}  Contributing Factors: {HEALTH FACTORS:660119068}    Wound: {Blank single:::\"N/A\",\"Abrasion\",\"Burn\",\"Blister\",\"Contusion\",\"Crush Injury\",\"Instect Bite\",\"Laceration\",\"Open bite\",\"Puncture\",\"Nonhealing surgical due to infection\",\"External surgical dehiscence\",\"Internal surgical dehiscence\",\"Surgical incision\",\"Wound dehiscence\",\"Dehiscence of traumatic injury wound repair\",\"Dehiscence amputation stump\",\"Complete traumatic amputation\",\"Partial traumatic amputation\",\"***\"}        PAST MEDICAL HISTORY        Diagnosis Date    A-fib (HCC)     Anxiety     Atrial fibrillation (HCC)     Biventricular congestive heart failure (HCC)     Bronchiectasis with acute exacerbation (HCC) 2022    CAD (coronary artery disease)     Chronic pain syndrome     dilaudid infusion pump    COPD (chronic obstructive pulmonary disease) (HCC)     Depression     GERD (gastroesophageal reflux disease)     Hypothyroidism     Impaired fasting glucose     Iron deficiency anemia     Osteoporosis     Pulmonary fibrosis (HCC) 2022    Sleep apnea     Subdural hematoma (HCC) 2022       PAST SURGICAL HISTORY    Past Surgical History:   Procedure Laterality Date    BACK

## 2025-07-10 NOTE — PLAN OF CARE
Problem: Pain  Goal: Verbalizes/displays adequate comfort level or baseline comfort level  Outcome: Progressing     Problem: Cognitive:  Goal: Understands risk factors for wounds  Description: Understands risk factors for wounds  Outcome: Progressing     Problem: Wound:  Goal: Will show signs of wound healing; wound closure and no evidence of infection  Description: Will show signs of wound healing; wound closure and no evidence of infection  Outcome: Progressing     Problem: Arterial:  Goal: Optimize blood flow for wound healing  Description: Optimize blood flow for wound healing  Outcome: Progressing     Problem: Venous:  Goal: Signs of wound healing will improve  Description: Signs of wound healing will improve  Outcome: Progressing     Problem: Falls - Risk of:  Goal: Will remain free from falls  Description: Will remain free from falls  Outcome: Progressing

## 2025-07-10 NOTE — PROGRESS NOTES
Wound Management Medical Nutrition Therapy Follow-Up    Wt Readings from Last 3 Encounters:   07/10/25 62 kg (136 lb 9.6 oz)   07/08/25 62.2 kg (137 lb 3.2 oz)   06/24/25 61.2 kg (135 lb)     Significant Weight Change- No   Unintentional- No    Patient Aspirin, DULoxetine, HYDROcodone-acetaminophen, NONFORMULARY, OXYGEN, albuterol sulfate HFA, alendronate, atorvastatin, buPROPion, bumetanide, calcium citrate-vitamin D, citalopram, digoxin, dilTIAZem, guaiFENesin, ipratropium 0.5 mg-albuterol 2.5 mg, levothyroxine, polyethylene glycol, potassium chloride, pregabalin, rivaroxaban, sennosides-docusate sodium, silver sulfADIAZINE, spironolactone, therapeutic multivitamin-minerals, tiZANidine, traZODone, umeclidinium-vilanterol, and vitamin C    Labs -   Lab Results   Component Value Date/Time    GLUCOSE 128 06/28/2025 10:35 AM    LABA1C 5.0 04/07/2025 10:36 AM   No results found for: \"VITD25\"    Other Labs - none noted    Dietary Recall Reveals- Pt reports eating 2 meals and a snack daily. Does well on protein. Needs fluids thickened and cannot stand water thickened, so she drinks apple juice. Drinks vanilla boost at home. Takes vitamin C and MVI with minerals.    Medical Nutrition Therapy Discussed-   Encouraged protein and fluid intakes. Supported boost for healing needs.        Nutrition Therapy Recommendations-   Continue to have well balanced meals with a variety of lean meats, fresh fruits, vegetables, whole grains and low fat dairy.  Continue vitamin C and Multivitamin with minerals for healing needs.   Include protein foods for healing needs.       Follow-up- quarterly    Deborah Garcia RDN, ANDREW 7/10/2025 9:19 AM          PQRS Measure: #1(Diabetes: Hemoglobin A1C Poor Control) - NA   PQRS Measure #130 (Documentation of Current Medications in Medical Record) - yes

## 2025-07-10 NOTE — CARE COORDINATION
Care Transitions Note    Follow Up Call     Patient Current Location:  Home:  [address] [place] [address] [place] [address]    LPN Care Coordinator contacted the patient by telephone. Verified name and  as identifiers.    Additional needs identified to be addressed with provider   No needs identified                 Method of communication with provider: none.    Care Summary Note: Writer spoke to Toshia spouse answered the phone handed to her she states she's doing well sitting outside getting some fresh air. She denies any new falls. Reviewed appointments notes from wound care appointment today, discussed diet need for protein continue vitamin C and multivitamin. Aware to reach out if she develops new or worsening concerns with leg lt leg wound and bilateral toe wounds. She denies f/c/n/v/d.She states she does have someone who comes over to help her sometimes, states that she also has been taking care of feet.  She states she has no abdominal pain she is having soft BM daily. She voiced no needs or concerns agreeable to follow up call 11-14 days.      Plan of care updates since last contact:  Review of patient management of conditions/medications: wounds volvulus       Advance Care Planning:   Does patient have an Advance Directive: deferred at this time, will discuss on future follow up. .    Medication Review:  No changes since last call.     Remote Patient Monitoring:  Offered patient enrollment in the Remote Patient Monitoring (RPM) program for in-home monitoring: Patient is not eligible for RPM program because: affiliate provider.    Assessments:  Care Transitions Subsequent and Final Call    Subsequent and Final Calls  Are you currently active with any services?: Home Health  Care Transitions Interventions     Other Therapy Services: Completed    Home Care Waiver: Completed Physical Therapy: Completed       Transportation Support: Declined      DME Assistance: Declined   Disease Specific Clinic: Completed Occupational

## 2025-07-23 ENCOUNTER — CARE COORDINATION (OUTPATIENT)
Dept: CARE COORDINATION | Age: 80
End: 2025-07-23

## 2025-07-23 NOTE — CARE COORDINATION
Care Transitions Note    Final Call     Patient Current Location:  Home: [address] [address]    LPN Care Coordinator contacted the patient by telephone. Verified name and  as identifiers.    Patient graduated from the Care Transitions program on 25.  Patient/family has the ability to self-manage at this time..      Advance Care Planning:   Does patient have an Advance Directive: reviewed during previous call, see note. .    Handoff:   Patient was not referred to the ACM team due to no additional needs identified.       Care Summary Note: Writer spoke with Toshia for her final care transitions call. She states she is doing good-was outside getting some fresh air at the time of our call. She states she is not having any ongoing abdominal pain and is having regular soft bowel movements. She denies having any nausea,vomiting,diarrhea or constipation. She was in to see her PCP last week for cough and congestion-no meds were ordered at that time. She was advised to increase fluids and monitor her symptoms. She states they have resolved and she is now feeling better.  She denies having any new needs or concerns at this time-will end care transitions.     Assessments:  Care Transitions Subsequent and Final Call    Subsequent and Final Calls  Do you have any ongoing symptoms?: No  Have your medications changed?: No  Do you have any questions related to your medications?: No  Do you currently have any active services?: No  Are you currently active with any services?: Home Health  Do you have any needs or concerns that I can assist you with?: No  Identified Barriers: None  Care Transitions Interventions     Other Therapy Services: Completed    Home Care Waiver: Completed Physical Therapy: Completed       Transportation Support: Declined      DME Assistance: Declined   Disease Specific Clinic: Completed Occupational Therapy: Completed     Disease Association: Completed      Other Interventions:

## 2025-07-24 ENCOUNTER — OFFICE VISIT (OUTPATIENT)
Dept: CARDIOLOGY | Age: 80
End: 2025-07-24
Payer: MEDICARE

## 2025-07-24 VITALS
BODY MASS INDEX: 21.99 KG/M2 | WEIGHT: 132 LBS | SYSTOLIC BLOOD PRESSURE: 135 MMHG | HEART RATE: 78 BPM | DIASTOLIC BLOOD PRESSURE: 57 MMHG | HEIGHT: 65 IN | OXYGEN SATURATION: 91 % | RESPIRATION RATE: 18 BRPM

## 2025-07-24 DIAGNOSIS — J96.11 CHRONIC RESPIRATORY FAILURE WITH HYPOXIA (HCC): ICD-10-CM

## 2025-07-24 DIAGNOSIS — J44.9 STAGE 3 SEVERE COPD BY GOLD CLASSIFICATION (HCC): ICD-10-CM

## 2025-07-24 DIAGNOSIS — I25.10 MILD CAD: ICD-10-CM

## 2025-07-24 DIAGNOSIS — Z86.79 HISTORY OF ATRIAL FIBRILLATION: ICD-10-CM

## 2025-07-24 DIAGNOSIS — Z79.01 CHRONIC ANTICOAGULATION: ICD-10-CM

## 2025-07-24 DIAGNOSIS — I50.42 CHRONIC COMBINED SYSTOLIC AND DIASTOLIC CONGESTIVE HEART FAILURE (HCC): Primary | ICD-10-CM

## 2025-07-24 DIAGNOSIS — R06.02 SOB (SHORTNESS OF BREATH): ICD-10-CM

## 2025-07-24 PROCEDURE — 1123F ACP DISCUSS/DSCN MKR DOCD: CPT | Performed by: INTERNAL MEDICINE

## 2025-07-24 PROCEDURE — 1090F PRES/ABSN URINE INCON ASSESS: CPT | Performed by: INTERNAL MEDICINE

## 2025-07-24 PROCEDURE — 1036F TOBACCO NON-USER: CPT | Performed by: INTERNAL MEDICINE

## 2025-07-24 PROCEDURE — 99211 OFF/OP EST MAY X REQ PHY/QHP: CPT | Performed by: INTERNAL MEDICINE

## 2025-07-24 PROCEDURE — G8427 DOCREV CUR MEDS BY ELIG CLIN: HCPCS | Performed by: INTERNAL MEDICINE

## 2025-07-24 PROCEDURE — 99214 OFFICE O/P EST MOD 30 MIN: CPT | Performed by: INTERNAL MEDICINE

## 2025-07-24 PROCEDURE — G8420 CALC BMI NORM PARAMETERS: HCPCS | Performed by: INTERNAL MEDICINE

## 2025-07-24 PROCEDURE — 3023F SPIROM DOC REV: CPT | Performed by: INTERNAL MEDICINE

## 2025-07-24 PROCEDURE — 1159F MED LIST DOCD IN RCRD: CPT | Performed by: INTERNAL MEDICINE

## 2025-07-24 PROCEDURE — G8399 PT W/DXA RESULTS DOCUMENT: HCPCS | Performed by: INTERNAL MEDICINE

## 2025-07-24 PROCEDURE — 1111F DSCHRG MED/CURRENT MED MERGE: CPT | Performed by: INTERNAL MEDICINE

## 2025-07-24 NOTE — PROGRESS NOTES
by Dr. Casper in 01/2025. An emergency room visit occurred on 06/25/2025 due to a fall caused by tripping on her oxygen cord. She was admitted to [facility] and discharged on 06/28/2025 after being diagnosed with volvulus, experiencing abdominal pain for 3 days and no bowel movement, treated conservatively. Current medications include aspirin 81 mg daily, Lipitor 40 mg daily, Bumex 2 mg twice a day, Wellbutrin 300 mg daily, citalopram 40 mg daily, digoxin 125 mcg once a day, diltiazem 120 mg daily, Cymbalta 60 mg daily, DuoNeb nebulizer, Synthroid, and multivitamin.    She reports feeling well overall, with no chest pain, pressure, or tightness. No current stomach pain is reported, and bowel movements are regular. She uses MiraLAX to manage her bowel movements. Weight loss has occurred due to the recent passing of her sister, but she is now eating more and feeling better. An active lifestyle is maintained, including walking and gardening. No difficulty is reported with indoor tasks such as using the bathroom, showering, dressing, preparing meals, or making her bed. She reports no presence of blood in her urine or stool.    Symptoms include shortness of breath during physical exertion and palpitations at rest.    PAST SURGICAL HISTORY:  Left hip arthroplasty (01/2025)    SOCIAL HISTORY  Exercise: Walking, working in the garden, and doing things outside all day.  Diet: Eating more now after a period of reduced appetite following the sister's passing.          PAST MEDICAL HISTORY:        Past Medical History:   Diagnosis Date    A-fib (HCC)     Anxiety     Atrial fibrillation (HCC)     Biventricular congestive heart failure (HCC)     Bronchiectasis with acute exacerbation (HCC) 04/29/2022    CAD (coronary artery disease)     Chronic pain syndrome     dilaudid infusion pump    COPD (chronic obstructive pulmonary disease) (HCC)     Depression     GERD (gastroesophageal reflux disease)     Hypothyroidism

## 2025-07-26 PROBLEM — Z01.818 PRE-OP EVALUATION: Status: RESOLVED | Noted: 2025-06-26 | Resolved: 2025-07-26

## 2025-07-31 ENCOUNTER — HOSPITAL ENCOUNTER (OUTPATIENT)
Dept: WOUND CARE | Age: 80
Discharge: HOME | End: 2025-07-31
Payer: MEDICARE

## 2025-07-31 VITALS
SYSTOLIC BLOOD PRESSURE: 129 MMHG | HEART RATE: 93 BPM | TEMPERATURE: 97.7 F | DIASTOLIC BLOOD PRESSURE: 61 MMHG | RESPIRATION RATE: 20 BRPM

## 2025-07-31 DIAGNOSIS — L97.521 CHRONIC ULCER OF GREAT TOE OF LEFT FOOT, LIMITED TO BREAKDOWN OF SKIN (HCC): ICD-10-CM

## 2025-07-31 DIAGNOSIS — L97.821 ULCER OF LEFT PRETIBIAL REGION, LIMITED TO BREAKDOWN OF SKIN (HCC): Primary | ICD-10-CM

## 2025-07-31 DIAGNOSIS — L97.511 CHRONIC ULCER OF GREAT TOE OF RIGHT FOOT, LIMITED TO BREAKDOWN OF SKIN (HCC): ICD-10-CM

## 2025-07-31 PROCEDURE — 97597 DBRDMT OPN WND 1ST 20 CM/<: CPT

## 2025-07-31 RX ORDER — MUPIROCIN 2 %
OINTMENT (GRAM) TOPICAL PRN
OUTPATIENT
Start: 2025-07-31

## 2025-07-31 RX ORDER — BACITRACIN ZINC AND POLYMYXIN B SULFATE 500; 1000 [USP'U]/G; [USP'U]/G
OINTMENT TOPICAL PRN
OUTPATIENT
Start: 2025-07-31

## 2025-07-31 RX ORDER — NEOMYCIN/BACITRACIN/POLYMYXINB 3.5-400-5K
OINTMENT (GRAM) TOPICAL PRN
OUTPATIENT
Start: 2025-07-31

## 2025-07-31 RX ORDER — SODIUM CHLOR/HYPOCHLOROUS ACID 0.033 %
SOLUTION, IRRIGATION IRRIGATION PRN
OUTPATIENT
Start: 2025-07-31

## 2025-07-31 RX ORDER — GINSENG 100 MG
CAPSULE ORAL PRN
OUTPATIENT
Start: 2025-07-31

## 2025-07-31 RX ORDER — SILVER SULFADIAZINE 10 MG/G
CREAM TOPICAL PRN
OUTPATIENT
Start: 2025-07-31

## 2025-07-31 RX ORDER — GENTAMICIN SULFATE 1 MG/G
OINTMENT TOPICAL PRN
OUTPATIENT
Start: 2025-07-31

## 2025-07-31 ASSESSMENT — PAIN SCALES - GENERAL: PAINLEVEL_OUTOF10: 0

## 2025-07-31 NOTE — PROGRESS NOTES
STEPHANIE Hendrickson    Consent obtained:  {yes no:144760}    Time out taken:  {yes no:977447}    Pain Control:   RegeneCarea HA spray       Debridement:Non-excisional Debridement    Using curette and #15 blade scalpel the wound(s)/ulcer(s) was/were sharply debrided down through and including the removal of epidermis and dermis.        Devitalized Tissue Debrided:  biofilm and callus    Pre Debridement Measurements:  Are located in the Wound/Ulcer Documentation Flow Sheet    Wound/Ulcer #: 1,2, & 3     Post Debridement Measurements:  Wound/Ulcer Descriptions are Pre Debridement except measurements:    Wound 06/10/24 Buttocks Left (Active)   Number of days: 415       Wound 06/10/24 Coccyx Inner (Active)   Number of days: 415       Wound 06/24/25 Leg Anterior;Left;Lower #1Left anterior lower leg (Active)   Wound Image   07/31/25 1014   Wound Etiology Arterial 07/31/25 0956   Dressing Status New dressing applied 07/31/25 1014   Wound Cleansed Irrigated with saline 07/31/25 1014   Dressing/Treatment Foam impregnated with Ag 07/31/25 1014   Dressing Change Due 08/02/25 07/31/25 1014   Wound Length (cm) 1.5 cm 07/31/25 0956   Wound Width (cm) 0.6 cm 07/31/25 0956   Wound Depth (cm) 0.1 cm 07/31/25 0956   Wound Surface Area (cm^2) 0.9 cm^2 07/31/25 0956   Change in Wound Size % (l*w) 55 07/31/25 0956   Wound Volume (cm^3) 0.09 cm^3 07/31/25 0956   Wound Healing % 55 07/31/25 0956   Post-Procedure Length (cm) 2 cm 07/31/25 1014   Post-Procedure Width (cm) 0.8 cm 07/31/25 1014   Post-Procedure Depth (cm) 0.1 cm 07/31/25 1014   Post-Procedure Surface Area (cm^2) 1.6 cm^2 07/31/25 1014   Post-Procedure Volume (cm^3) 0.16 cm^3 07/31/25 1014   Wound Assessment Slough 07/31/25 0956   Drainage Amount Moderate (25-50%) 07/31/25 0956   Drainage Description Serosanguinous 07/31/25 0956   Odor None 07/31/25 0956   Meghan-wound Assessment Warm;Hyperkeratosis (callous);Non-blanchable erythema 07/31/25 0956   Margins Attached edges 07/31/25 0956

## 2025-07-31 NOTE — DISCHARGE INSTRUCTIONS
Wound Management Patient Discharge Instructions    CALL [phone] for questions regarding care of your wounds.  USUAL OFFICE HOURS ARE TUESDAYS MORNINGS AND THURSDAYS(9-2:30) and subject to change without notice     PLEASE ARRIVE PRIOR TO APPOINTMENT TIME TO COMPLETE REGISTRATION PROCESS        Discharge instructions for the patient's plan of care.    Wound care: Left lower leg daily apply silver hydrogel to silver pad and cover with non-adherent pad, change silver pad every 3 days.  Daily Left great toe apply neosporin to area cover with bandaide.  Daily Right great toe apply silver hydrogel to area cover with bandaide.  Spenco insert can be purchased at [facility] in [place].  Return to clinic Thursday 8/28/25 at 10:00AM    Next appointment:  Future Appointments   Date Time Provider Department [place]   8/28/2025 10:00 AM Ricky Hendrickson DPM [facility] WND [place]   10/6/2025  1:00 PM Hitesh Arroyo DO [facility]   11/24/2025 10:00 AM Jong Moraes MD [facility]   2/3/2026 10:20 AM Jose Miner MD [place] CARD [facility]   5/19/2026  9:30 AM Jong Moraes MD [facility]        Your wound care supplies were ordered from [facility], unless otherwise stated.  If you do not receive them in 3 days call them at [phone].      SURVEY:    You may be receiving a survey from [facility] regarding your visit today.    Please complete the survey to enable us to provide the highest quality of care to you and your family.    If you cannot score us a very good on any question, please call the office to discuss how we could have made your experience a very good one.        REMINDER:  You will receive 2 bills for each visit.  One is a professional fee charge for the physician and the other is a facility fee for the hospital.

## 2025-08-04 RX ORDER — POLYETHYLENE GLYCOL 3350 17 G/17G
POWDER, FOR SOLUTION ORAL
Qty: 510 G | Refills: 5 | Status: SHIPPED | OUTPATIENT
Start: 2025-08-04

## 2025-08-14 ENCOUNTER — HOSPITAL ENCOUNTER (INPATIENT)
Age: 80
LOS: 4 days | Discharge: HOME | DRG: 193 | End: 2025-08-18
Attending: EMERGENCY MEDICINE | Admitting: INTERNAL MEDICINE
Payer: MEDICARE

## 2025-08-14 ENCOUNTER — APPOINTMENT (OUTPATIENT)
Dept: GENERAL RADIOLOGY | Age: 80
DRG: 193 | End: 2025-08-14
Payer: MEDICARE

## 2025-08-14 DIAGNOSIS — J44.1 COPD EXACERBATION (HCC): Primary | ICD-10-CM

## 2025-08-14 PROBLEM — J18.9 COMMUNITY ACQUIRED PNEUMONIA OF RIGHT LOWER LOBE OF LUNG: Status: ACTIVE | Noted: 2025-08-14

## 2025-08-14 LAB
ALBUMIN SERPL-MCNC: 4.3 G/DL (ref 3.5–5.2)
ALBUMIN/GLOB SERPL: 1.4 {RATIO} (ref 1–2.5)
ALP SERPL-CCNC: 91 U/L (ref 35–104)
ALT SERPL-CCNC: 13 U/L (ref 10–35)
ANION GAP SERPL CALCULATED.3IONS-SCNC: 11 MMOL/L (ref 9–16)
ARTERIAL PATENCY WRIST A: ABNORMAL
ARTERIAL PATENCY WRIST A: YES
AST SERPL-CCNC: 21 U/L (ref 10–35)
B PARAP IS1001 DNA NPH QL NAA+NON-PROBE: NOT DETECTED
B PERT DNA SPEC QL NAA+PROBE: NOT DETECTED
BASOPHILS # BLD: 0 K/UL (ref 0–0.2)
BASOPHILS NFR BLD: 0 % (ref 0–2)
BDY SITE: ABNORMAL
BILIRUB SERPL-MCNC: 0.8 MG/DL (ref 0–1.2)
BODY TEMPERATURE: 37
BODY TEMPERATURE: 37.3
BUN SERPL-MCNC: 14 MG/DL (ref 8–23)
BUN/CREAT SERPL: 23 (ref 9–20)
C PNEUM DNA NPH QL NAA+NON-PROBE: NOT DETECTED
CALCIUM SERPL-MCNC: 9.5 MG/DL (ref 8.6–10.4)
CHLORIDE SERPL-SCNC: 100 MMOL/L (ref 98–107)
CO2 SERPL-SCNC: 30 MMOL/L (ref 20–31)
CREAT SERPL-MCNC: 0.6 MG/DL (ref 0.5–0.9)
EKG ATRIAL RATE: 108 BPM
EKG P AXIS: 79 DEGREES
EKG P-R INTERVAL: 172 MS
EKG Q-T INTERVAL: 332 MS
EKG QRS DURATION: 86 MS
EKG QTC CALCULATION (BAZETT): 444 MS
EKG R AXIS: 12 DEGREES
EKG T AXIS: 99 DEGREES
EKG VENTRICULAR RATE: 108 BPM
EOSINOPHIL # BLD: 0.07 K/UL (ref 0–0.44)
EOSINOPHILS RELATIVE PERCENT: 1 % (ref 1–4)
ERYTHROCYTE [DISTWIDTH] IN BLOOD BY AUTOMATED COUNT: 14.1 % (ref 11.8–14.4)
FIO2 ON VENT: 100 %
FIO2 ON VENT: 32 %
FLUAV RNA NPH QL NAA+NON-PROBE: NOT DETECTED
FLUBV RNA NPH QL NAA+NON-PROBE: NOT DETECTED
GAS FLOW.O2 O2 DELIVERY SYS: ABNORMAL L/MIN
GAS FLOW.O2 O2 DELIVERY SYS: ABNORMAL L/MIN
GFR, ESTIMATED: >90 ML/MIN/1.73M2
GLUCOSE SERPL-MCNC: 107 MG/DL (ref 74–99)
HADV DNA NPH QL NAA+NON-PROBE: NOT DETECTED
HCO3 ARTERIAL: 30 MMOL/L (ref 22–26)
HCO3 VENOUS: 31.1 MMOL/L (ref 24–30)
HCOV 229E RNA NPH QL NAA+NON-PROBE: NOT DETECTED
HCOV HKU1 RNA NPH QL NAA+NON-PROBE: NOT DETECTED
HCOV NL63 RNA NPH QL NAA+NON-PROBE: NOT DETECTED
HCOV OC43 RNA NPH QL NAA+NON-PROBE: NOT DETECTED
HCT VFR BLD AUTO: 45.6 % (ref 36.3–47.1)
HGB BLD-MCNC: 15.1 G/DL (ref 11.9–15.1)
HMPV RNA NPH QL NAA+NON-PROBE: NOT DETECTED
HPIV1 RNA NPH QL NAA+NON-PROBE: NOT DETECTED
HPIV2 RNA NPH QL NAA+NON-PROBE: NOT DETECTED
HPIV3 RNA NPH QL NAA+NON-PROBE: NOT DETECTED
HPIV4 RNA NPH QL NAA+NON-PROBE: NOT DETECTED
IMM GRANULOCYTES # BLD AUTO: 0 K/UL (ref 0–0.3)
IMM GRANULOCYTES NFR BLD: 0 %
LACTATE BLDV-SCNC: 1.9 MMOL/L (ref 0.5–2.2)
LYMPHOCYTES NFR BLD: 0.49 K/UL (ref 1.1–3.7)
LYMPHOCYTES RELATIVE PERCENT: 7 % (ref 24–43)
M PNEUMO DNA NPH QL NAA+NON-PROBE: NOT DETECTED
MCH RBC QN AUTO: 31.7 PG (ref 25.2–33.5)
MCHC RBC AUTO-ENTMCNC: 33.1 G/DL (ref 28.4–34.8)
MCV RBC AUTO: 95.6 FL (ref 82.6–102.9)
MONOCYTES NFR BLD: 0.56 K/UL (ref 0.1–1.2)
MONOCYTES NFR BLD: 8 % (ref 3–12)
MORPHOLOGY: NORMAL
NEUTROPHILS NFR BLD: 84 % (ref 36–65)
NEUTS SEG NFR BLD: 5.88 K/UL (ref 1.5–8.1)
NRBC BLD-RTO: 0 PER 100 WBC
O2 SAT, ARTERIAL: 96.3 % (ref 95–98)
O2 SAT, VEN: 36.5 % (ref 60–85)
PCO2 ARTERIAL: 55.2 MMHG (ref 35–45)
PCO2 VENOUS: 61.1 MM HG (ref 39–55)
PCO2, ART, TEMP ADJ: 55.2 (ref 35–45)
PCO2, VEN, TEMP ADJ: 61.9 MMHG (ref 39–55)
PH ARTERIAL: 7.35 (ref 7.35–7.45)
PH VENOUS: 7.33 (ref 7.32–7.42)
PH, ART, TEMP ADJ: 7.35 (ref 7.35–7.45)
PH, VEN, TEMP ADJ: 7.32 (ref 7.32–7.42)
PLATELET # BLD AUTO: 194 K/UL (ref 138–453)
PMV BLD AUTO: 9.6 FL (ref 8.1–13.5)
PO2 ARTERIAL: 89.2 MMHG (ref 80–100)
PO2 VENOUS: 23.7 MM HG (ref 30–50)
PO2, ART, TEMP ADJ: 89.2 MMHG (ref 80–100)
PO2, VEN, TEMP ADJ: 24.2 MMHG (ref 30–50)
POSITIVE BASE EXCESS, ART: 3 MMOL/L (ref 0–2)
POSITIVE BASE EXCESS, VEN: 3.3 MMOL/L (ref 0–2)
POTASSIUM SERPL-SCNC: 3.8 MMOL/L (ref 3.7–5.3)
PROCALCITONIN SERPL-MCNC: 0.13 NG/ML (ref 0–0.09)
PROT SERPL-MCNC: 7.2 G/DL (ref 6.6–8.7)
RBC # BLD AUTO: 4.77 M/UL (ref 3.95–5.11)
RSV RNA NPH QL NAA+NON-PROBE: NOT DETECTED
RV+EV RNA NPH QL NAA+NON-PROBE: NOT DETECTED
SARS-COV-2 RDRP RESP QL NAA+PROBE: NOT DETECTED
SARS-COV-2 RNA NPH QL NAA+NON-PROBE: NOT DETECTED
SODIUM SERPL-SCNC: 141 MMOL/L (ref 136–145)
SPECIMEN DESCRIPTION: NORMAL
SPECIMEN DESCRIPTION: NORMAL
TEXT FOR RESPIRATORY: ABNORMAL
TROPONIN I SERPL HS-MCNC: 9 NG/L (ref 0–14)
WBC OTHER # BLD: 7 K/UL (ref 3.5–11.3)

## 2025-08-14 PROCEDURE — 94669 MECHANICAL CHEST WALL OSCILL: CPT

## 2025-08-14 PROCEDURE — 6360000002 HC RX W HCPCS: Performed by: NURSE PRACTITIONER

## 2025-08-14 PROCEDURE — 2500000003 HC RX 250 WO HCPCS: Performed by: NURSE PRACTITIONER

## 2025-08-14 PROCEDURE — 84484 ASSAY OF TROPONIN QUANT: CPT

## 2025-08-14 PROCEDURE — 2700000000 HC OXYGEN THERAPY PER DAY

## 2025-08-14 PROCEDURE — 6370000000 HC RX 637 (ALT 250 FOR IP): Performed by: NURSE PRACTITIONER

## 2025-08-14 PROCEDURE — 2580000003 HC RX 258: Performed by: NURSE PRACTITIONER

## 2025-08-14 PROCEDURE — 87635 SARS-COV-2 COVID-19 AMP PRB: CPT

## 2025-08-14 PROCEDURE — 36600 WITHDRAWAL OF ARTERIAL BLOOD: CPT

## 2025-08-14 PROCEDURE — 36415 COLL VENOUS BLD VENIPUNCTURE: CPT

## 2025-08-14 PROCEDURE — 93005 ELECTROCARDIOGRAM TRACING: CPT | Performed by: EMERGENCY MEDICINE

## 2025-08-14 PROCEDURE — 83605 ASSAY OF LACTIC ACID: CPT

## 2025-08-14 PROCEDURE — 93010 ELECTROCARDIOGRAM REPORT: CPT | Performed by: INTERNAL MEDICINE

## 2025-08-14 PROCEDURE — 99285 EMERGENCY DEPT VISIT HI MDM: CPT

## 2025-08-14 PROCEDURE — 0202U NFCT DS 22 TRGT SARS-COV-2: CPT

## 2025-08-14 PROCEDURE — 94761 N-INVAS EAR/PLS OXIMETRY MLT: CPT

## 2025-08-14 PROCEDURE — 71045 X-RAY EXAM CHEST 1 VIEW: CPT

## 2025-08-14 PROCEDURE — 94640 AIRWAY INHALATION TREATMENT: CPT

## 2025-08-14 PROCEDURE — 80053 COMPREHEN METABOLIC PANEL: CPT

## 2025-08-14 PROCEDURE — 5A09357 ASSISTANCE WITH RESPIRATORY VENTILATION, LESS THAN 24 CONSECUTIVE HOURS, CONTINUOUS POSITIVE AIRWAY PRESSURE: ICD-10-PCS | Performed by: STUDENT IN AN ORGANIZED HEALTH CARE EDUCATION/TRAINING PROGRAM

## 2025-08-14 PROCEDURE — 2000000000 HC ICU R&B

## 2025-08-14 PROCEDURE — 82805 BLOOD GASES W/O2 SATURATION: CPT

## 2025-08-14 PROCEDURE — 6360000002 HC RX W HCPCS: Performed by: EMERGENCY MEDICINE

## 2025-08-14 PROCEDURE — 85025 COMPLETE CBC W/AUTO DIFF WBC: CPT

## 2025-08-14 PROCEDURE — 94660 CPAP INITIATION&MGMT: CPT

## 2025-08-14 PROCEDURE — 87040 BLOOD CULTURE FOR BACTERIA: CPT

## 2025-08-14 PROCEDURE — 6370000000 HC RX 637 (ALT 250 FOR IP): Performed by: INTERNAL MEDICINE

## 2025-08-14 PROCEDURE — 96374 THER/PROPH/DIAG INJ IV PUSH: CPT

## 2025-08-14 PROCEDURE — 2500000003 HC RX 250 WO HCPCS: Performed by: EMERGENCY MEDICINE

## 2025-08-14 PROCEDURE — 96375 TX/PRO/DX INJ NEW DRUG ADDON: CPT

## 2025-08-14 PROCEDURE — 84145 PROCALCITONIN (PCT): CPT

## 2025-08-14 PROCEDURE — 94664 DEMO&/EVAL PT USE INHALER: CPT

## 2025-08-14 RX ORDER — ASCORBIC ACID 500 MG
500 TABLET ORAL 2 TIMES DAILY
Status: DISCONTINUED | OUTPATIENT
Start: 2025-08-14 | End: 2025-08-18 | Stop reason: HOSPADM

## 2025-08-14 RX ORDER — ONDANSETRON 2 MG/ML
4 INJECTION INTRAMUSCULAR; INTRAVENOUS ONCE
Status: COMPLETED | OUTPATIENT
Start: 2025-08-14 | End: 2025-08-14

## 2025-08-14 RX ORDER — ACETAMINOPHEN 325 MG/1
650 TABLET ORAL EVERY 6 HOURS PRN
Status: DISCONTINUED | OUTPATIENT
Start: 2025-08-14 | End: 2025-08-18 | Stop reason: HOSPADM

## 2025-08-14 RX ORDER — ONDANSETRON 4 MG/1
4 TABLET, ORALLY DISINTEGRATING ORAL EVERY 8 HOURS PRN
Status: DISCONTINUED | OUTPATIENT
Start: 2025-08-14 | End: 2025-08-18 | Stop reason: HOSPADM

## 2025-08-14 RX ORDER — SPIRONOLACTONE 25 MG/1
50 TABLET ORAL DAILY
Status: DISCONTINUED | OUTPATIENT
Start: 2025-08-14 | End: 2025-08-18 | Stop reason: HOSPADM

## 2025-08-14 RX ORDER — ALBUTEROL SULFATE 0.83 MG/ML
2.5 SOLUTION RESPIRATORY (INHALATION)
Status: DISCONTINUED | OUTPATIENT
Start: 2025-08-14 | End: 2025-08-15

## 2025-08-14 RX ORDER — SODIUM CHLORIDE 0.9 % (FLUSH) 0.9 %
5-40 SYRINGE (ML) INJECTION EVERY 12 HOURS SCHEDULED
Status: DISCONTINUED | OUTPATIENT
Start: 2025-08-14 | End: 2025-08-18 | Stop reason: HOSPADM

## 2025-08-14 RX ORDER — POLYETHYLENE GLYCOL 3350 17 G/17G
17 POWDER, FOR SOLUTION ORAL DAILY PRN
Status: DISCONTINUED | OUTPATIENT
Start: 2025-08-14 | End: 2025-08-18 | Stop reason: HOSPADM

## 2025-08-14 RX ORDER — ALBUTEROL SULFATE 0.83 MG/ML
2.5 SOLUTION RESPIRATORY (INHALATION) ONCE
Status: COMPLETED | OUTPATIENT
Start: 2025-08-14 | End: 2025-08-14

## 2025-08-14 RX ORDER — ONDANSETRON 2 MG/ML
4 INJECTION INTRAMUSCULAR; INTRAVENOUS EVERY 6 HOURS PRN
Status: DISCONTINUED | OUTPATIENT
Start: 2025-08-14 | End: 2025-08-18 | Stop reason: HOSPADM

## 2025-08-14 RX ORDER — SODIUM CHLORIDE 0.9 % (FLUSH) 0.9 %
5-40 SYRINGE (ML) INJECTION PRN
Status: DISCONTINUED | OUTPATIENT
Start: 2025-08-14 | End: 2025-08-18 | Stop reason: HOSPADM

## 2025-08-14 RX ORDER — POLYETHYLENE GLYCOL 3350 17 G/17G
17 POWDER, FOR SOLUTION ORAL 2 TIMES DAILY
Status: DISCONTINUED | OUTPATIENT
Start: 2025-08-14 | End: 2025-08-18 | Stop reason: HOSPADM

## 2025-08-14 RX ORDER — IPRATROPIUM BROMIDE AND ALBUTEROL SULFATE 2.5; .5 MG/3ML; MG/3ML
1 SOLUTION RESPIRATORY (INHALATION)
Status: DISCONTINUED | OUTPATIENT
Start: 2025-08-14 | End: 2025-08-14

## 2025-08-14 RX ORDER — CITALOPRAM HYDROBROMIDE 20 MG/1
40 TABLET ORAL DAILY
Status: DISCONTINUED | OUTPATIENT
Start: 2025-08-15 | End: 2025-08-18 | Stop reason: HOSPADM

## 2025-08-14 RX ORDER — SODIUM CHLORIDE 9 MG/ML
INJECTION, SOLUTION INTRAVENOUS PRN
Status: DISCONTINUED | OUTPATIENT
Start: 2025-08-14 | End: 2025-08-18 | Stop reason: HOSPADM

## 2025-08-14 RX ORDER — ACETAMINOPHEN 650 MG/1
650 SUPPOSITORY RECTAL EVERY 6 HOURS PRN
Status: DISCONTINUED | OUTPATIENT
Start: 2025-08-14 | End: 2025-08-18 | Stop reason: HOSPADM

## 2025-08-14 RX ORDER — GUAIFENESIN 600 MG/1
600 TABLET, EXTENDED RELEASE ORAL 2 TIMES DAILY
Status: DISCONTINUED | OUTPATIENT
Start: 2025-08-14 | End: 2025-08-18 | Stop reason: HOSPADM

## 2025-08-14 RX ORDER — ASPIRIN 81 MG/1
81 TABLET, CHEWABLE ORAL DAILY
Status: DISCONTINUED | OUTPATIENT
Start: 2025-08-15 | End: 2025-08-18 | Stop reason: HOSPADM

## 2025-08-14 RX ORDER — ATORVASTATIN CALCIUM 40 MG/1
40 TABLET, FILM COATED ORAL NIGHTLY
Status: DISCONTINUED | OUTPATIENT
Start: 2025-08-14 | End: 2025-08-18 | Stop reason: HOSPADM

## 2025-08-14 RX ORDER — POTASSIUM CHLORIDE 1500 MG/1
20 TABLET, EXTENDED RELEASE ORAL 2 TIMES DAILY
Status: DISCONTINUED | OUTPATIENT
Start: 2025-08-14 | End: 2025-08-18 | Stop reason: HOSPADM

## 2025-08-14 RX ORDER — PREGABALIN 75 MG/1
300 CAPSULE ORAL 2 TIMES DAILY
Status: DISCONTINUED | OUTPATIENT
Start: 2025-08-14 | End: 2025-08-18 | Stop reason: HOSPADM

## 2025-08-14 RX ORDER — DIGOXIN 125 MCG
125 TABLET ORAL DAILY
Status: DISCONTINUED | OUTPATIENT
Start: 2025-08-15 | End: 2025-08-18 | Stop reason: HOSPADM

## 2025-08-14 RX ORDER — BUMETANIDE 1 MG/1
2 TABLET ORAL 2 TIMES DAILY
Status: DISCONTINUED | OUTPATIENT
Start: 2025-08-14 | End: 2025-08-16

## 2025-08-14 RX ORDER — ALBUTEROL SULFATE 90 UG/1
2 INHALANT RESPIRATORY (INHALATION) 4 TIMES DAILY PRN
Status: DISCONTINUED | OUTPATIENT
Start: 2025-08-14 | End: 2025-08-18 | Stop reason: HOSPADM

## 2025-08-14 RX ORDER — SODIUM CHLORIDE 9 MG/ML
INJECTION, SOLUTION INTRAVENOUS CONTINUOUS
Status: DISCONTINUED | OUTPATIENT
Start: 2025-08-14 | End: 2025-08-15

## 2025-08-14 RX ORDER — HYDROCODONE BITARTRATE AND ACETAMINOPHEN 10; 325 MG/1; MG/1
1 TABLET ORAL EVERY 6 HOURS PRN
Status: DISCONTINUED | OUTPATIENT
Start: 2025-08-14 | End: 2025-08-18 | Stop reason: HOSPADM

## 2025-08-14 RX ORDER — DILTIAZEM HYDROCHLORIDE 120 MG/1
120 CAPSULE, COATED, EXTENDED RELEASE ORAL DAILY
Status: DISCONTINUED | OUTPATIENT
Start: 2025-08-14 | End: 2025-08-18 | Stop reason: HOSPADM

## 2025-08-14 RX ORDER — LEVOTHYROXINE SODIUM 100 UG/1
200 TABLET ORAL DAILY
Status: DISCONTINUED | OUTPATIENT
Start: 2025-08-15 | End: 2025-08-18 | Stop reason: HOSPADM

## 2025-08-14 RX ORDER — M-VIT,TX,IRON,MINS/CALC/FOLIC 27MG-0.4MG
1 TABLET ORAL DAILY
Status: DISCONTINUED | OUTPATIENT
Start: 2025-08-15 | End: 2025-08-18 | Stop reason: HOSPADM

## 2025-08-14 RX ORDER — SILVER SULFADIAZINE 10 MG/G
CREAM TOPICAL DAILY
Status: DISCONTINUED | OUTPATIENT
Start: 2025-08-15 | End: 2025-08-18 | Stop reason: HOSPADM

## 2025-08-14 RX ORDER — TRAZODONE HYDROCHLORIDE 50 MG/1
200 TABLET ORAL NIGHTLY
Status: DISCONTINUED | OUTPATIENT
Start: 2025-08-14 | End: 2025-08-18 | Stop reason: HOSPADM

## 2025-08-14 RX ORDER — BUPROPION HYDROCHLORIDE 150 MG/1
300 TABLET ORAL EVERY MORNING
Status: DISCONTINUED | OUTPATIENT
Start: 2025-08-15 | End: 2025-08-18 | Stop reason: HOSPADM

## 2025-08-14 RX ORDER — IPRATROPIUM BROMIDE AND ALBUTEROL SULFATE 2.5; .5 MG/3ML; MG/3ML
1 SOLUTION RESPIRATORY (INHALATION)
Status: DISCONTINUED | OUTPATIENT
Start: 2025-08-14 | End: 2025-08-15

## 2025-08-14 RX ORDER — DULOXETIN HYDROCHLORIDE 60 MG/1
60 CAPSULE, DELAYED RELEASE ORAL DAILY
Status: DISCONTINUED | OUTPATIENT
Start: 2025-08-15 | End: 2025-08-18 | Stop reason: HOSPADM

## 2025-08-14 RX ADMIN — PREGABALIN 300 MG: 75 CAPSULE ORAL at 21:13

## 2025-08-14 RX ADMIN — GUAIFENESIN 600 MG: 600 TABLET, EXTENDED RELEASE ORAL at 21:13

## 2025-08-14 RX ADMIN — WATER 60 MG: 1 INJECTION INTRAMUSCULAR; INTRAVENOUS; SUBCUTANEOUS at 17:40

## 2025-08-14 RX ADMIN — ONDANSETRON 4 MG: 2 INJECTION, SOLUTION INTRAMUSCULAR; INTRAVENOUS at 09:53

## 2025-08-14 RX ADMIN — IPRATROPIUM BROMIDE AND ALBUTEROL SULFATE 1 DOSE: .5; 2.5 SOLUTION RESPIRATORY (INHALATION) at 20:23

## 2025-08-14 RX ADMIN — ALBUTEROL SULFATE 2.5 MG: 2.5 SOLUTION RESPIRATORY (INHALATION) at 09:32

## 2025-08-14 RX ADMIN — OXYCODONE HYDROCHLORIDE AND ACETAMINOPHEN 500 MG: 500 TABLET ORAL at 21:13

## 2025-08-14 RX ADMIN — POLYETHYLENE GLYCOL 3350 17 G: 17 POWDER, FOR SOLUTION ORAL at 21:14

## 2025-08-14 RX ADMIN — ATORVASTATIN CALCIUM 40 MG: 40 TABLET, FILM COATED ORAL at 21:13

## 2025-08-14 RX ADMIN — DOXYCYCLINE 100 MG: 100 INJECTION, POWDER, LYOPHILIZED, FOR SOLUTION INTRAVENOUS at 14:34

## 2025-08-14 RX ADMIN — SODIUM CHLORIDE, PRESERVATIVE FREE 10 ML: 5 INJECTION INTRAVENOUS at 21:18

## 2025-08-14 RX ADMIN — WATER 1000 MG: 1 INJECTION INTRAMUSCULAR; INTRAVENOUS; SUBCUTANEOUS at 11:06

## 2025-08-14 RX ADMIN — IPRATROPIUM BROMIDE AND ALBUTEROL SULFATE 1 DOSE: .5; 2.5 SOLUTION RESPIRATORY (INHALATION) at 23:43

## 2025-08-14 RX ADMIN — SODIUM CHLORIDE: 0.9 INJECTION, SOLUTION INTRAVENOUS at 14:32

## 2025-08-14 RX ADMIN — POTASSIUM CHLORIDE 20 MEQ: 1500 TABLET, EXTENDED RELEASE ORAL at 21:14

## 2025-08-14 RX ADMIN — ACETAMINOPHEN 650 MG: 325 TABLET ORAL at 19:00

## 2025-08-14 RX ADMIN — IPRATROPIUM BROMIDE AND ALBUTEROL SULFATE 1 DOSE: .5; 2.5 SOLUTION RESPIRATORY (INHALATION) at 14:59

## 2025-08-14 RX ADMIN — WATER 125 MG: 1 INJECTION INTRAMUSCULAR; INTRAVENOUS; SUBCUTANEOUS at 09:33

## 2025-08-14 ASSESSMENT — PAIN DESCRIPTION - LOCATION
LOCATION: BACK;LEG
LOCATION: BACK;LEG

## 2025-08-14 ASSESSMENT — PAIN SCALES - GENERAL
PAINLEVEL_OUTOF10: 6
PAINLEVEL_OUTOF10: 7

## 2025-08-14 ASSESSMENT — PAIN DESCRIPTION - FREQUENCY
FREQUENCY: CONTINUOUS
FREQUENCY: CONTINUOUS

## 2025-08-14 ASSESSMENT — PAIN - FUNCTIONAL ASSESSMENT
PAIN_FUNCTIONAL_ASSESSMENT: 0-10
PAIN_FUNCTIONAL_ASSESSMENT: ACTIVITIES ARE NOT PREVENTED
PAIN_FUNCTIONAL_ASSESSMENT: 0-10

## 2025-08-14 ASSESSMENT — PAIN DESCRIPTION - ORIENTATION: ORIENTATION: RIGHT;LEFT

## 2025-08-14 ASSESSMENT — PAIN DESCRIPTION - PAIN TYPE
TYPE: CHRONIC PAIN
TYPE: CHRONIC PAIN

## 2025-08-14 ASSESSMENT — PAIN DESCRIPTION - DESCRIPTORS
DESCRIPTORS: ACHING
DESCRIPTORS: ACHING

## 2025-08-15 LAB
ANION GAP SERPL CALCULATED.3IONS-SCNC: 11 MMOL/L (ref 9–16)
BASOPHILS # BLD: <0.03 K/UL (ref 0–0.2)
BASOPHILS NFR BLD: 0 % (ref 0–2)
BILIRUB UR QL STRIP: NEGATIVE
BUN SERPL-MCNC: 20 MG/DL (ref 8–23)
BUN/CREAT SERPL: 33 (ref 9–20)
CALCIUM SERPL-MCNC: 9 MG/DL (ref 8.6–10.4)
CHLORIDE SERPL-SCNC: 101 MMOL/L (ref 98–107)
CLARITY UR: CLEAR
CO2 SERPL-SCNC: 25 MMOL/L (ref 20–31)
COLOR UR: YELLOW
CREAT SERPL-MCNC: 0.6 MG/DL (ref 0.5–0.9)
EOSINOPHIL # BLD: <0.03 K/UL (ref 0–0.44)
EOSINOPHILS RELATIVE PERCENT: 0 % (ref 1–4)
EPI CELLS #/AREA URNS HPF: ABNORMAL /HPF (ref 0–25)
ERYTHROCYTE [DISTWIDTH] IN BLOOD BY AUTOMATED COUNT: 14.6 % (ref 11.8–14.4)
GFR, ESTIMATED: >90 ML/MIN/1.73M2
GLUCOSE SERPL-MCNC: 146 MG/DL (ref 74–99)
GLUCOSE UR STRIP-MCNC: ABNORMAL MG/DL
HCT VFR BLD AUTO: 37.1 % (ref 36.3–47.1)
HGB BLD-MCNC: 12.2 G/DL (ref 11.9–15.1)
HGB UR QL STRIP.AUTO: NEGATIVE
IMM GRANULOCYTES # BLD AUTO: <0.03 K/UL (ref 0–0.3)
IMM GRANULOCYTES NFR BLD: 0 %
KETONES UR STRIP-MCNC: NEGATIVE MG/DL
LEUKOCYTE ESTERASE UR QL STRIP: NEGATIVE
LYMPHOCYTES NFR BLD: 0.64 K/UL (ref 1.1–3.7)
LYMPHOCYTES RELATIVE PERCENT: 6 % (ref 24–43)
MCH RBC QN AUTO: 31.9 PG (ref 25.2–33.5)
MCHC RBC AUTO-ENTMCNC: 32.9 G/DL (ref 28.4–34.8)
MCV RBC AUTO: 97.1 FL (ref 82.6–102.9)
MONOCYTES NFR BLD: 0.37 K/UL (ref 0.1–1.2)
MONOCYTES NFR BLD: 3 % (ref 3–12)
MUCOUS THREADS URNS QL MICRO: ABNORMAL
NEUTROPHILS NFR BLD: 91 % (ref 36–65)
NEUTS SEG NFR BLD: 10.16 K/UL (ref 1.5–8.1)
NITRITE UR QL STRIP: NEGATIVE
NRBC BLD-RTO: 0 PER 100 WBC
PH UR STRIP: 6 [PH] (ref 5–9)
PLATELET # BLD AUTO: 184 K/UL (ref 138–453)
PMV BLD AUTO: 9.8 FL (ref 8.1–13.5)
POTASSIUM SERPL-SCNC: 4.1 MMOL/L (ref 3.7–5.3)
PROT UR STRIP-MCNC: NEGATIVE MG/DL
RBC # BLD AUTO: 3.82 M/UL (ref 3.95–5.11)
RBC #/AREA URNS HPF: ABNORMAL /HPF (ref 0–2)
SODIUM SERPL-SCNC: 137 MMOL/L (ref 136–145)
SP GR UR STRIP: 1.02 (ref 1.01–1.02)
UROBILINOGEN UR STRIP-ACNC: NORMAL EU/DL (ref 0–1)
WBC #/AREA URNS HPF: ABNORMAL /HPF (ref 0–5)
WBC OTHER # BLD: 11.2 K/UL (ref 3.5–11.3)

## 2025-08-15 PROCEDURE — 85025 COMPLETE CBC W/AUTO DIFF WBC: CPT

## 2025-08-15 PROCEDURE — 6370000000 HC RX 637 (ALT 250 FOR IP): Performed by: NURSE PRACTITIONER

## 2025-08-15 PROCEDURE — 94669 MECHANICAL CHEST WALL OSCILL: CPT

## 2025-08-15 PROCEDURE — 2700000000 HC OXYGEN THERAPY PER DAY

## 2025-08-15 PROCEDURE — 6370000000 HC RX 637 (ALT 250 FOR IP): Performed by: INTERNAL MEDICINE

## 2025-08-15 PROCEDURE — 81001 URINALYSIS AUTO W/SCOPE: CPT

## 2025-08-15 PROCEDURE — 6360000002 HC RX W HCPCS: Performed by: NURSE PRACTITIONER

## 2025-08-15 PROCEDURE — 2580000003 HC RX 258: Performed by: NURSE PRACTITIONER

## 2025-08-15 PROCEDURE — 2500000003 HC RX 250 WO HCPCS: Performed by: NURSE PRACTITIONER

## 2025-08-15 PROCEDURE — 94660 CPAP INITIATION&MGMT: CPT

## 2025-08-15 PROCEDURE — 6370000000 HC RX 637 (ALT 250 FOR IP)

## 2025-08-15 PROCEDURE — 94640 AIRWAY INHALATION TREATMENT: CPT

## 2025-08-15 PROCEDURE — 97530 THERAPEUTIC ACTIVITIES: CPT

## 2025-08-15 PROCEDURE — 97161 PT EVAL LOW COMPLEX 20 MIN: CPT

## 2025-08-15 PROCEDURE — 51798 US URINE CAPACITY MEASURE: CPT

## 2025-08-15 PROCEDURE — 97166 OT EVAL MOD COMPLEX 45 MIN: CPT

## 2025-08-15 PROCEDURE — 1200000000 HC SEMI PRIVATE

## 2025-08-15 PROCEDURE — 36415 COLL VENOUS BLD VENIPUNCTURE: CPT

## 2025-08-15 PROCEDURE — 94761 N-INVAS EAR/PLS OXIMETRY MLT: CPT

## 2025-08-15 PROCEDURE — 80048 BASIC METABOLIC PNL TOTAL CA: CPT

## 2025-08-15 PROCEDURE — 94664 DEMO&/EVAL PT USE INHALER: CPT

## 2025-08-15 RX ORDER — IPRATROPIUM BROMIDE AND ALBUTEROL SULFATE 2.5; .5 MG/3ML; MG/3ML
1 SOLUTION RESPIRATORY (INHALATION)
Status: DISCONTINUED | OUTPATIENT
Start: 2025-08-15 | End: 2025-08-18 | Stop reason: HOSPADM

## 2025-08-15 RX ORDER — ALBUTEROL SULFATE 0.83 MG/ML
2.5 SOLUTION RESPIRATORY (INHALATION) EVERY 4 HOURS PRN
Status: DISCONTINUED | OUTPATIENT
Start: 2025-08-15 | End: 2025-08-18 | Stop reason: HOSPADM

## 2025-08-15 RX ADMIN — DOXYCYCLINE 100 MG: 100 INJECTION, POWDER, LYOPHILIZED, FOR SOLUTION INTRAVENOUS at 02:13

## 2025-08-15 RX ADMIN — DIGOXIN 125 MCG: 125 TABLET ORAL at 07:15

## 2025-08-15 RX ADMIN — BUMETANIDE 2 MG: 1 TABLET ORAL at 20:43

## 2025-08-15 RX ADMIN — HYDROCODONE BITARTRATE AND ACETAMINOPHEN 1 TABLET: 10; 325 TABLET ORAL at 12:31

## 2025-08-15 RX ADMIN — BUPROPION HYDROCHLORIDE 300 MG: 150 TABLET, EXTENDED RELEASE ORAL at 07:15

## 2025-08-15 RX ADMIN — HYDROCODONE BITARTRATE AND ACETAMINOPHEN 1 TABLET: 10; 325 TABLET ORAL at 18:46

## 2025-08-15 RX ADMIN — CITALOPRAM HYDROBROMIDE 40 MG: 20 TABLET ORAL at 07:15

## 2025-08-15 RX ADMIN — WATER 1000 MG: 1 INJECTION INTRAMUSCULAR; INTRAVENOUS; SUBCUTANEOUS at 08:44

## 2025-08-15 RX ADMIN — RIVAROXABAN 20 MG: 20 TABLET, FILM COATED ORAL at 07:15

## 2025-08-15 RX ADMIN — GUAIFENESIN 600 MG: 600 TABLET, EXTENDED RELEASE ORAL at 07:15

## 2025-08-15 RX ADMIN — ATORVASTATIN CALCIUM 40 MG: 40 TABLET, FILM COATED ORAL at 20:43

## 2025-08-15 RX ADMIN — IPRATROPIUM BROMIDE AND ALBUTEROL SULFATE 1 DOSE: .5; 2.5 SOLUTION RESPIRATORY (INHALATION) at 15:34

## 2025-08-15 RX ADMIN — SODIUM CHLORIDE, PRESERVATIVE FREE 10 ML: 5 INJECTION INTRAVENOUS at 20:45

## 2025-08-15 RX ADMIN — SODIUM CHLORIDE: 0.9 INJECTION, SOLUTION INTRAVENOUS at 05:25

## 2025-08-15 RX ADMIN — IPRATROPIUM BROMIDE AND ALBUTEROL SULFATE 1 DOSE: .5; 2.5 SOLUTION RESPIRATORY (INHALATION) at 20:14

## 2025-08-15 RX ADMIN — ASPIRIN 81 MG: 81 TABLET, CHEWABLE ORAL at 07:15

## 2025-08-15 RX ADMIN — ACETAMINOPHEN 650 MG: 325 TABLET ORAL at 04:05

## 2025-08-15 RX ADMIN — SILVER SULFADIAZINE: 10 CREAM TOPICAL at 10:23

## 2025-08-15 RX ADMIN — POTASSIUM CHLORIDE 20 MEQ: 1500 TABLET, EXTENDED RELEASE ORAL at 20:43

## 2025-08-15 RX ADMIN — DILTIAZEM HYDROCHLORIDE 120 MG: 120 CAPSULE, COATED, EXTENDED RELEASE ORAL at 08:41

## 2025-08-15 RX ADMIN — GUAIFENESIN 600 MG: 600 TABLET, EXTENDED RELEASE ORAL at 20:43

## 2025-08-15 RX ADMIN — OXYCODONE HYDROCHLORIDE AND ACETAMINOPHEN 500 MG: 500 TABLET ORAL at 07:15

## 2025-08-15 RX ADMIN — OXYCODONE HYDROCHLORIDE AND ACETAMINOPHEN 500 MG: 500 TABLET ORAL at 20:43

## 2025-08-15 RX ADMIN — HYDROCODONE BITARTRATE AND ACETAMINOPHEN 1 TABLET: 10; 325 TABLET ORAL at 05:25

## 2025-08-15 RX ADMIN — POLYETHYLENE GLYCOL 3350 17 G: 17 POWDER, FOR SOLUTION ORAL at 07:15

## 2025-08-15 RX ADMIN — PREGABALIN 300 MG: 75 CAPSULE ORAL at 07:15

## 2025-08-15 RX ADMIN — POLYETHYLENE GLYCOL 3350 17 G: 17 POWDER, FOR SOLUTION ORAL at 20:43

## 2025-08-15 RX ADMIN — SODIUM CHLORIDE, PRESERVATIVE FREE 10 ML: 5 INJECTION INTRAVENOUS at 07:16

## 2025-08-15 RX ADMIN — POTASSIUM CHLORIDE 20 MEQ: 1500 TABLET, EXTENDED RELEASE ORAL at 07:15

## 2025-08-15 RX ADMIN — DOXYCYCLINE 100 MG: 100 INJECTION, POWDER, LYOPHILIZED, FOR SOLUTION INTRAVENOUS at 14:31

## 2025-08-15 RX ADMIN — WATER 60 MG: 1 INJECTION INTRAMUSCULAR; INTRAVENOUS; SUBCUTANEOUS at 17:30

## 2025-08-15 RX ADMIN — Medication 1 TABLET: at 07:15

## 2025-08-15 RX ADMIN — LEVOTHYROXINE SODIUM 200 MCG: 0.1 TABLET ORAL at 07:14

## 2025-08-15 RX ADMIN — IPRATROPIUM BROMIDE AND ALBUTEROL SULFATE 1 DOSE: .5; 2.5 SOLUTION RESPIRATORY (INHALATION) at 04:26

## 2025-08-15 RX ADMIN — PREGABALIN 300 MG: 75 CAPSULE ORAL at 20:43

## 2025-08-15 RX ADMIN — WATER 60 MG: 1 INJECTION INTRAMUSCULAR; INTRAVENOUS; SUBCUTANEOUS at 05:27

## 2025-08-15 RX ADMIN — DULOXETINE 60 MG: 60 CAPSULE, DELAYED RELEASE ORAL at 07:15

## 2025-08-15 RX ADMIN — IPRATROPIUM BROMIDE AND ALBUTEROL SULFATE 1 DOSE: .5; 2.5 SOLUTION RESPIRATORY (INHALATION) at 10:39

## 2025-08-15 ASSESSMENT — PAIN DESCRIPTION - ORIENTATION
ORIENTATION: INNER
ORIENTATION: RIGHT;LEFT
ORIENTATION: LOWER;LEFT

## 2025-08-15 ASSESSMENT — PAIN - FUNCTIONAL ASSESSMENT
PAIN_FUNCTIONAL_ASSESSMENT: 0-10
PAIN_FUNCTIONAL_ASSESSMENT: ACTIVITIES ARE NOT PREVENTED
PAIN_FUNCTIONAL_ASSESSMENT: 0-10
PAIN_FUNCTIONAL_ASSESSMENT: ACTIVITIES ARE NOT PREVENTED
PAIN_FUNCTIONAL_ASSESSMENT: ACTIVITIES ARE NOT PREVENTED

## 2025-08-15 ASSESSMENT — PAIN SCALES - GENERAL
PAINLEVEL_OUTOF10: 0
PAINLEVEL_OUTOF10: 7
PAINLEVEL_OUTOF10: 8
PAINLEVEL_OUTOF10: 0

## 2025-08-15 ASSESSMENT — PAIN DESCRIPTION - DESCRIPTORS
DESCRIPTORS: ACHING
DESCRIPTORS: ACHING

## 2025-08-15 ASSESSMENT — PAIN DESCRIPTION - LOCATION
LOCATION: HAND;BACK;LEG
LOCATION: HEAD
LOCATION: HEAD
LOCATION: BACK;LEG

## 2025-08-16 ENCOUNTER — APPOINTMENT (OUTPATIENT)
Dept: GENERAL RADIOLOGY | Age: 80
DRG: 193 | End: 2025-08-16
Payer: MEDICARE

## 2025-08-16 LAB
ANION GAP SERPL CALCULATED.3IONS-SCNC: 11 MMOL/L (ref 9–16)
BASOPHILS # BLD: <0.03 K/UL (ref 0–0.2)
BASOPHILS NFR BLD: 0 % (ref 0–2)
BNP SERPL-MCNC: 1580 PG/ML (ref 0–450)
BUN SERPL-MCNC: 17 MG/DL (ref 8–23)
BUN/CREAT SERPL: 34 (ref 9–20)
CALCIUM SERPL-MCNC: 9.3 MG/DL (ref 8.6–10.4)
CHLORIDE SERPL-SCNC: 101 MMOL/L (ref 98–107)
CO2 SERPL-SCNC: 30 MMOL/L (ref 20–31)
CREAT SERPL-MCNC: 0.5 MG/DL (ref 0.5–0.9)
EOSINOPHIL # BLD: <0.03 K/UL (ref 0–0.44)
EOSINOPHILS RELATIVE PERCENT: 0 % (ref 1–4)
ERYTHROCYTE [DISTWIDTH] IN BLOOD BY AUTOMATED COUNT: 14.4 % (ref 11.8–14.4)
GFR, ESTIMATED: >90 ML/MIN/1.73M2
GLUCOSE SERPL-MCNC: 147 MG/DL (ref 74–99)
HCT VFR BLD AUTO: 39.8 % (ref 36.3–47.1)
HGB BLD-MCNC: 12.8 G/DL (ref 11.9–15.1)
IMM GRANULOCYTES # BLD AUTO: 0.04 K/UL (ref 0–0.3)
IMM GRANULOCYTES NFR BLD: 1 %
LYMPHOCYTES NFR BLD: 0.51 K/UL (ref 1.1–3.7)
LYMPHOCYTES RELATIVE PERCENT: 6 % (ref 24–43)
MCH RBC QN AUTO: 31 PG (ref 25.2–33.5)
MCHC RBC AUTO-ENTMCNC: 32.2 G/DL (ref 28.4–34.8)
MCV RBC AUTO: 96.4 FL (ref 82.6–102.9)
MONOCYTES NFR BLD: 0.33 K/UL (ref 0.1–1.2)
MONOCYTES NFR BLD: 4 % (ref 3–12)
NEUTROPHILS NFR BLD: 89 % (ref 36–65)
NEUTS SEG NFR BLD: 7.81 K/UL (ref 1.5–8.1)
NRBC BLD-RTO: 0 PER 100 WBC
PLATELET # BLD AUTO: 218 K/UL (ref 138–453)
PMV BLD AUTO: 9.8 FL (ref 8.1–13.5)
POTASSIUM SERPL-SCNC: 4.5 MMOL/L (ref 3.7–5.3)
RBC # BLD AUTO: 4.13 M/UL (ref 3.95–5.11)
SODIUM SERPL-SCNC: 142 MMOL/L (ref 136–145)
WBC OTHER # BLD: 8.7 K/UL (ref 3.5–11.3)

## 2025-08-16 PROCEDURE — 94664 DEMO&/EVAL PT USE INHALER: CPT

## 2025-08-16 PROCEDURE — 94669 MECHANICAL CHEST WALL OSCILL: CPT

## 2025-08-16 PROCEDURE — 6370000000 HC RX 637 (ALT 250 FOR IP): Performed by: INTERNAL MEDICINE

## 2025-08-16 PROCEDURE — 83880 ASSAY OF NATRIURETIC PEPTIDE: CPT

## 2025-08-16 PROCEDURE — 94761 N-INVAS EAR/PLS OXIMETRY MLT: CPT

## 2025-08-16 PROCEDURE — 97116 GAIT TRAINING THERAPY: CPT

## 2025-08-16 PROCEDURE — 2580000003 HC RX 258: Performed by: NURSE PRACTITIONER

## 2025-08-16 PROCEDURE — 1200000000 HC SEMI PRIVATE

## 2025-08-16 PROCEDURE — 85025 COMPLETE CBC W/AUTO DIFF WBC: CPT

## 2025-08-16 PROCEDURE — 6360000002 HC RX W HCPCS: Performed by: NURSE PRACTITIONER

## 2025-08-16 PROCEDURE — 80048 BASIC METABOLIC PNL TOTAL CA: CPT

## 2025-08-16 PROCEDURE — 36415 COLL VENOUS BLD VENIPUNCTURE: CPT

## 2025-08-16 PROCEDURE — 6370000000 HC RX 637 (ALT 250 FOR IP): Performed by: STUDENT IN AN ORGANIZED HEALTH CARE EDUCATION/TRAINING PROGRAM

## 2025-08-16 PROCEDURE — 71046 X-RAY EXAM CHEST 2 VIEWS: CPT

## 2025-08-16 PROCEDURE — 97110 THERAPEUTIC EXERCISES: CPT

## 2025-08-16 PROCEDURE — 6370000000 HC RX 637 (ALT 250 FOR IP): Performed by: NURSE PRACTITIONER

## 2025-08-16 PROCEDURE — 2500000003 HC RX 250 WO HCPCS: Performed by: NURSE PRACTITIONER

## 2025-08-16 PROCEDURE — 2700000000 HC OXYGEN THERAPY PER DAY

## 2025-08-16 PROCEDURE — 94640 AIRWAY INHALATION TREATMENT: CPT

## 2025-08-16 PROCEDURE — 94660 CPAP INITIATION&MGMT: CPT

## 2025-08-16 RX ORDER — BUMETANIDE 1 MG/1
2 TABLET ORAL DAILY
Status: DISCONTINUED | OUTPATIENT
Start: 2025-08-16 | End: 2025-08-18 | Stop reason: HOSPADM

## 2025-08-16 RX ORDER — DIPHENHYDRAMINE HCL 25 MG
25 CAPSULE ORAL EVERY 6 HOURS PRN
Status: DISCONTINUED | OUTPATIENT
Start: 2025-08-16 | End: 2025-08-18 | Stop reason: HOSPADM

## 2025-08-16 RX ADMIN — PREGABALIN 300 MG: 75 CAPSULE ORAL at 20:21

## 2025-08-16 RX ADMIN — DOXYCYCLINE 100 MG: 100 INJECTION, POWDER, LYOPHILIZED, FOR SOLUTION INTRAVENOUS at 14:16

## 2025-08-16 RX ADMIN — IPRATROPIUM BROMIDE AND ALBUTEROL SULFATE 1 DOSE: .5; 2.5 SOLUTION RESPIRATORY (INHALATION) at 19:49

## 2025-08-16 RX ADMIN — SPIRONOLACTONE 50 MG: 25 TABLET ORAL at 10:07

## 2025-08-16 RX ADMIN — ATORVASTATIN CALCIUM 40 MG: 40 TABLET, FILM COATED ORAL at 20:21

## 2025-08-16 RX ADMIN — LEVOTHYROXINE SODIUM 200 MCG: 0.1 TABLET ORAL at 10:06

## 2025-08-16 RX ADMIN — OXYCODONE HYDROCHLORIDE AND ACETAMINOPHEN 500 MG: 500 TABLET ORAL at 20:22

## 2025-08-16 RX ADMIN — SODIUM CHLORIDE, PRESERVATIVE FREE 10 ML: 5 INJECTION INTRAVENOUS at 20:21

## 2025-08-16 RX ADMIN — TRAZODONE HYDROCHLORIDE 200 MG: 50 TABLET ORAL at 20:21

## 2025-08-16 RX ADMIN — DILTIAZEM HYDROCHLORIDE 120 MG: 120 CAPSULE, COATED, EXTENDED RELEASE ORAL at 10:07

## 2025-08-16 RX ADMIN — SODIUM CHLORIDE, PRESERVATIVE FREE 10 ML: 5 INJECTION INTRAVENOUS at 10:09

## 2025-08-16 RX ADMIN — DIGOXIN 125 MCG: 125 TABLET ORAL at 10:07

## 2025-08-16 RX ADMIN — DULOXETINE 60 MG: 60 CAPSULE, DELAYED RELEASE ORAL at 10:07

## 2025-08-16 RX ADMIN — HYDROCODONE BITARTRATE AND ACETAMINOPHEN 1 TABLET: 10; 325 TABLET ORAL at 07:56

## 2025-08-16 RX ADMIN — BUPROPION HYDROCHLORIDE 300 MG: 150 TABLET, EXTENDED RELEASE ORAL at 10:06

## 2025-08-16 RX ADMIN — POLYETHYLENE GLYCOL 3350 17 G: 17 POWDER, FOR SOLUTION ORAL at 16:06

## 2025-08-16 RX ADMIN — RIVAROXABAN 20 MG: 20 TABLET, FILM COATED ORAL at 10:06

## 2025-08-16 RX ADMIN — DIPHENHYDRAMINE HYDROCHLORIDE 25 MG: 25 CAPSULE ORAL at 16:08

## 2025-08-16 RX ADMIN — OXYCODONE HYDROCHLORIDE AND ACETAMINOPHEN 500 MG: 500 TABLET ORAL at 10:07

## 2025-08-16 RX ADMIN — GUAIFENESIN 600 MG: 600 TABLET, EXTENDED RELEASE ORAL at 20:21

## 2025-08-16 RX ADMIN — WATER 60 MG: 1 INJECTION INTRAMUSCULAR; INTRAVENOUS; SUBCUTANEOUS at 17:59

## 2025-08-16 RX ADMIN — POLYETHYLENE GLYCOL 3350 17 G: 17 POWDER, FOR SOLUTION ORAL at 20:27

## 2025-08-16 RX ADMIN — DOXYCYCLINE 100 MG: 100 INJECTION, POWDER, LYOPHILIZED, FOR SOLUTION INTRAVENOUS at 03:50

## 2025-08-16 RX ADMIN — Medication 1 TABLET: at 10:07

## 2025-08-16 RX ADMIN — IPRATROPIUM BROMIDE AND ALBUTEROL SULFATE 1 DOSE: .5; 2.5 SOLUTION RESPIRATORY (INHALATION) at 10:45

## 2025-08-16 RX ADMIN — ASPIRIN 81 MG: 81 TABLET, CHEWABLE ORAL at 10:06

## 2025-08-16 RX ADMIN — POTASSIUM CHLORIDE 20 MEQ: 1500 TABLET, EXTENDED RELEASE ORAL at 10:07

## 2025-08-16 RX ADMIN — BUMETANIDE 2 MG: 1 TABLET ORAL at 10:06

## 2025-08-16 RX ADMIN — IPRATROPIUM BROMIDE AND ALBUTEROL SULFATE 1 DOSE: .5; 2.5 SOLUTION RESPIRATORY (INHALATION) at 05:12

## 2025-08-16 RX ADMIN — WATER 60 MG: 1 INJECTION INTRAMUSCULAR; INTRAVENOUS; SUBCUTANEOUS at 05:30

## 2025-08-16 RX ADMIN — SILVER SULFADIAZINE: 10 CREAM TOPICAL at 10:09

## 2025-08-16 RX ADMIN — GUAIFENESIN 600 MG: 600 TABLET, EXTENDED RELEASE ORAL at 10:07

## 2025-08-16 RX ADMIN — CITALOPRAM HYDROBROMIDE 40 MG: 20 TABLET ORAL at 10:07

## 2025-08-16 RX ADMIN — IPRATROPIUM BROMIDE AND ALBUTEROL SULFATE 1 DOSE: .5; 2.5 SOLUTION RESPIRATORY (INHALATION) at 15:36

## 2025-08-16 RX ADMIN — WATER 1000 MG: 1 INJECTION INTRAMUSCULAR; INTRAVENOUS; SUBCUTANEOUS at 10:08

## 2025-08-16 RX ADMIN — TRAZODONE HYDROCHLORIDE 200 MG: 50 TABLET ORAL at 00:22

## 2025-08-16 RX ADMIN — PREGABALIN 300 MG: 75 CAPSULE ORAL at 10:06

## 2025-08-16 RX ADMIN — POTASSIUM CHLORIDE 20 MEQ: 1500 TABLET, EXTENDED RELEASE ORAL at 20:22

## 2025-08-16 ASSESSMENT — PAIN DESCRIPTION - PAIN TYPE: TYPE: CHRONIC PAIN

## 2025-08-16 ASSESSMENT — PAIN SCALES - GENERAL
PAINLEVEL_OUTOF10: 7
PAINLEVEL_OUTOF10: 0
PAINLEVEL_OUTOF10: 8

## 2025-08-16 ASSESSMENT — PAIN DESCRIPTION - ORIENTATION
ORIENTATION: LEFT;LOWER
ORIENTATION: LOWER

## 2025-08-16 ASSESSMENT — PAIN - FUNCTIONAL ASSESSMENT
PAIN_FUNCTIONAL_ASSESSMENT: ACTIVITIES ARE NOT PREVENTED
PAIN_FUNCTIONAL_ASSESSMENT: ACTIVITIES ARE NOT PREVENTED
PAIN_FUNCTIONAL_ASSESSMENT: 0-10

## 2025-08-16 ASSESSMENT — PAIN DESCRIPTION - FREQUENCY: FREQUENCY: CONTINUOUS

## 2025-08-16 ASSESSMENT — PAIN DESCRIPTION - LOCATION
LOCATION: BACK
LOCATION: BACK;LEG

## 2025-08-16 ASSESSMENT — PAIN DESCRIPTION - DESCRIPTORS
DESCRIPTORS: ACHING
DESCRIPTORS: ACHING

## 2025-08-17 LAB
ANION GAP SERPL CALCULATED.3IONS-SCNC: 12 MMOL/L (ref 9–16)
BASOPHILS # BLD: <0.03 K/UL (ref 0–0.2)
BASOPHILS NFR BLD: 0 % (ref 0–2)
BUN SERPL-MCNC: 16 MG/DL (ref 8–23)
BUN/CREAT SERPL: 32 (ref 9–20)
CALCIUM SERPL-MCNC: 9.1 MG/DL (ref 8.6–10.4)
CHLORIDE SERPL-SCNC: 100 MMOL/L (ref 98–107)
CO2 SERPL-SCNC: 26 MMOL/L (ref 20–31)
CREAT SERPL-MCNC: 0.5 MG/DL (ref 0.5–0.9)
EOSINOPHIL # BLD: <0.03 K/UL (ref 0–0.44)
EOSINOPHILS RELATIVE PERCENT: 0 % (ref 1–4)
ERYTHROCYTE [DISTWIDTH] IN BLOOD BY AUTOMATED COUNT: 14.6 % (ref 11.8–14.4)
GFR, ESTIMATED: >90 ML/MIN/1.73M2
GLUCOSE SERPL-MCNC: 134 MG/DL (ref 74–99)
HCT VFR BLD AUTO: 41.4 % (ref 36.3–47.1)
HGB BLD-MCNC: 13.4 G/DL (ref 11.9–15.1)
IMM GRANULOCYTES # BLD AUTO: 0.03 K/UL (ref 0–0.3)
IMM GRANULOCYTES NFR BLD: 1 %
LYMPHOCYTES NFR BLD: 0.74 K/UL (ref 1.1–3.7)
LYMPHOCYTES RELATIVE PERCENT: 11 % (ref 24–43)
MCH RBC QN AUTO: 31.4 PG (ref 25.2–33.5)
MCHC RBC AUTO-ENTMCNC: 32.4 G/DL (ref 28.4–34.8)
MCV RBC AUTO: 97 FL (ref 82.6–102.9)
MONOCYTES NFR BLD: 0.27 K/UL (ref 0.1–1.2)
MONOCYTES NFR BLD: 4 % (ref 3–12)
NEUTROPHILS NFR BLD: 84 % (ref 36–65)
NEUTS SEG NFR BLD: 5.59 K/UL (ref 1.5–8.1)
NRBC BLD-RTO: 0 PER 100 WBC
PLATELET # BLD AUTO: 228 K/UL (ref 138–453)
PMV BLD AUTO: 10.2 FL (ref 8.1–13.5)
POTASSIUM SERPL-SCNC: 5.1 MMOL/L (ref 3.7–5.3)
RBC # BLD AUTO: 4.27 M/UL (ref 3.95–5.11)
SODIUM SERPL-SCNC: 138 MMOL/L (ref 136–145)
WBC OTHER # BLD: 6.6 K/UL (ref 3.5–11.3)

## 2025-08-17 PROCEDURE — 94664 DEMO&/EVAL PT USE INHALER: CPT

## 2025-08-17 PROCEDURE — 2700000000 HC OXYGEN THERAPY PER DAY

## 2025-08-17 PROCEDURE — 6370000000 HC RX 637 (ALT 250 FOR IP): Performed by: NURSE PRACTITIONER

## 2025-08-17 PROCEDURE — 97535 SELF CARE MNGMENT TRAINING: CPT

## 2025-08-17 PROCEDURE — 1200000000 HC SEMI PRIVATE

## 2025-08-17 PROCEDURE — 97116 GAIT TRAINING THERAPY: CPT

## 2025-08-17 PROCEDURE — 2580000003 HC RX 258: Performed by: NURSE PRACTITIONER

## 2025-08-17 PROCEDURE — 6370000000 HC RX 637 (ALT 250 FOR IP): Performed by: INTERNAL MEDICINE

## 2025-08-17 PROCEDURE — 94640 AIRWAY INHALATION TREATMENT: CPT

## 2025-08-17 PROCEDURE — 97110 THERAPEUTIC EXERCISES: CPT

## 2025-08-17 PROCEDURE — 80048 BASIC METABOLIC PNL TOTAL CA: CPT

## 2025-08-17 PROCEDURE — 94761 N-INVAS EAR/PLS OXIMETRY MLT: CPT

## 2025-08-17 PROCEDURE — 94669 MECHANICAL CHEST WALL OSCILL: CPT

## 2025-08-17 PROCEDURE — 2500000003 HC RX 250 WO HCPCS: Performed by: NURSE PRACTITIONER

## 2025-08-17 PROCEDURE — 6360000002 HC RX W HCPCS: Performed by: NURSE PRACTITIONER

## 2025-08-17 PROCEDURE — 85025 COMPLETE CBC W/AUTO DIFF WBC: CPT

## 2025-08-17 PROCEDURE — 36415 COLL VENOUS BLD VENIPUNCTURE: CPT

## 2025-08-17 PROCEDURE — 6370000000 HC RX 637 (ALT 250 FOR IP): Performed by: STUDENT IN AN ORGANIZED HEALTH CARE EDUCATION/TRAINING PROGRAM

## 2025-08-17 RX ADMIN — DOXYCYCLINE 100 MG: 100 INJECTION, POWDER, LYOPHILIZED, FOR SOLUTION INTRAVENOUS at 02:23

## 2025-08-17 RX ADMIN — HYDROCODONE BITARTRATE AND ACETAMINOPHEN 1 TABLET: 10; 325 TABLET ORAL at 20:08

## 2025-08-17 RX ADMIN — WATER 1000 MG: 1 INJECTION INTRAMUSCULAR; INTRAVENOUS; SUBCUTANEOUS at 09:24

## 2025-08-17 RX ADMIN — IPRATROPIUM BROMIDE AND ALBUTEROL SULFATE 1 DOSE: .5; 2.5 SOLUTION RESPIRATORY (INHALATION) at 16:05

## 2025-08-17 RX ADMIN — Medication 1 TABLET: at 07:55

## 2025-08-17 RX ADMIN — SPIRONOLACTONE 50 MG: 25 TABLET ORAL at 07:56

## 2025-08-17 RX ADMIN — SODIUM CHLORIDE, PRESERVATIVE FREE 10 ML: 5 INJECTION INTRAVENOUS at 20:10

## 2025-08-17 RX ADMIN — POTASSIUM CHLORIDE 20 MEQ: 1500 TABLET, EXTENDED RELEASE ORAL at 07:56

## 2025-08-17 RX ADMIN — WATER 60 MG: 1 INJECTION INTRAMUSCULAR; INTRAVENOUS; SUBCUTANEOUS at 17:50

## 2025-08-17 RX ADMIN — PREGABALIN 300 MG: 75 CAPSULE ORAL at 20:09

## 2025-08-17 RX ADMIN — IPRATROPIUM BROMIDE AND ALBUTEROL SULFATE 1 DOSE: .5; 2.5 SOLUTION RESPIRATORY (INHALATION) at 20:53

## 2025-08-17 RX ADMIN — WATER 60 MG: 1 INJECTION INTRAMUSCULAR; INTRAVENOUS; SUBCUTANEOUS at 05:45

## 2025-08-17 RX ADMIN — SODIUM CHLORIDE, PRESERVATIVE FREE 10 ML: 5 INJECTION INTRAVENOUS at 07:57

## 2025-08-17 RX ADMIN — IPRATROPIUM BROMIDE AND ALBUTEROL SULFATE 1 DOSE: .5; 2.5 SOLUTION RESPIRATORY (INHALATION) at 05:34

## 2025-08-17 RX ADMIN — PREGABALIN 300 MG: 75 CAPSULE ORAL at 07:55

## 2025-08-17 RX ADMIN — DIGOXIN 125 MCG: 125 TABLET ORAL at 07:56

## 2025-08-17 RX ADMIN — Medication 1 TABLET: at 07:56

## 2025-08-17 RX ADMIN — POLYETHYLENE GLYCOL 3350 17 G: 17 POWDER, FOR SOLUTION ORAL at 20:10

## 2025-08-17 RX ADMIN — TRAZODONE HYDROCHLORIDE 200 MG: 50 TABLET ORAL at 20:08

## 2025-08-17 RX ADMIN — OXYCODONE HYDROCHLORIDE AND ACETAMINOPHEN 500 MG: 500 TABLET ORAL at 20:09

## 2025-08-17 RX ADMIN — CITALOPRAM HYDROBROMIDE 40 MG: 20 TABLET ORAL at 07:55

## 2025-08-17 RX ADMIN — LEVOTHYROXINE SODIUM 200 MCG: 0.1 TABLET ORAL at 07:54

## 2025-08-17 RX ADMIN — DILTIAZEM HYDROCHLORIDE 120 MG: 120 CAPSULE, COATED, EXTENDED RELEASE ORAL at 07:56

## 2025-08-17 RX ADMIN — ATORVASTATIN CALCIUM 40 MG: 40 TABLET, FILM COATED ORAL at 20:08

## 2025-08-17 RX ADMIN — OXYCODONE HYDROCHLORIDE AND ACETAMINOPHEN 500 MG: 500 TABLET ORAL at 07:56

## 2025-08-17 RX ADMIN — GUAIFENESIN 600 MG: 600 TABLET, EXTENDED RELEASE ORAL at 20:09

## 2025-08-17 RX ADMIN — BUPROPION HYDROCHLORIDE 300 MG: 150 TABLET, EXTENDED RELEASE ORAL at 07:56

## 2025-08-17 RX ADMIN — GUAIFENESIN 600 MG: 600 TABLET, EXTENDED RELEASE ORAL at 07:56

## 2025-08-17 RX ADMIN — ASPIRIN 81 MG: 81 TABLET, CHEWABLE ORAL at 07:56

## 2025-08-17 RX ADMIN — DULOXETINE 60 MG: 60 CAPSULE, DELAYED RELEASE ORAL at 07:55

## 2025-08-17 RX ADMIN — SILVER SULFADIAZINE: 10 CREAM TOPICAL at 07:57

## 2025-08-17 RX ADMIN — DOXYCYCLINE 100 MG: 100 INJECTION, POWDER, LYOPHILIZED, FOR SOLUTION INTRAVENOUS at 13:54

## 2025-08-17 RX ADMIN — RIVAROXABAN 20 MG: 20 TABLET, FILM COATED ORAL at 07:56

## 2025-08-17 RX ADMIN — BUMETANIDE 2 MG: 1 TABLET ORAL at 07:56

## 2025-08-17 RX ADMIN — IPRATROPIUM BROMIDE AND ALBUTEROL SULFATE 1 DOSE: .5; 2.5 SOLUTION RESPIRATORY (INHALATION) at 10:57

## 2025-08-17 RX ADMIN — POLYETHYLENE GLYCOL 3350 17 G: 17 POWDER, FOR SOLUTION ORAL at 07:58

## 2025-08-17 ASSESSMENT — PAIN DESCRIPTION - FREQUENCY
FREQUENCY: CONTINUOUS
FREQUENCY: CONTINUOUS

## 2025-08-17 ASSESSMENT — PAIN - FUNCTIONAL ASSESSMENT
PAIN_FUNCTIONAL_ASSESSMENT: 0-10
PAIN_FUNCTIONAL_ASSESSMENT: 0-10
PAIN_FUNCTIONAL_ASSESSMENT: ACTIVITIES ARE NOT PREVENTED
PAIN_FUNCTIONAL_ASSESSMENT: 0-10
PAIN_FUNCTIONAL_ASSESSMENT: ACTIVITIES ARE NOT PREVENTED
PAIN_FUNCTIONAL_ASSESSMENT: 0-10

## 2025-08-17 ASSESSMENT — PAIN DESCRIPTION - DESCRIPTORS
DESCRIPTORS: ACHING
DESCRIPTORS: DULL

## 2025-08-17 ASSESSMENT — PAIN DESCRIPTION - ONSET
ONSET: ON-GOING
ONSET: ON-GOING

## 2025-08-17 ASSESSMENT — PAIN DESCRIPTION - PAIN TYPE
TYPE: CHRONIC PAIN
TYPE: CHRONIC PAIN

## 2025-08-17 ASSESSMENT — PAIN DESCRIPTION - LOCATION
LOCATION: BACK
LOCATION: BACK;LEG

## 2025-08-17 ASSESSMENT — PAIN DESCRIPTION - ORIENTATION
ORIENTATION: LEFT
ORIENTATION: LEFT;RIGHT

## 2025-08-17 ASSESSMENT — PAIN SCALES - GENERAL
PAINLEVEL_OUTOF10: 8
PAINLEVEL_OUTOF10: 1
PAINLEVEL_OUTOF10: 4

## 2025-08-18 VITALS
RESPIRATION RATE: 16 BRPM | WEIGHT: 135 LBS | HEIGHT: 65 IN | SYSTOLIC BLOOD PRESSURE: 119 MMHG | DIASTOLIC BLOOD PRESSURE: 75 MMHG | OXYGEN SATURATION: 93 % | TEMPERATURE: 97.4 F | BODY MASS INDEX: 22.49 KG/M2 | HEART RATE: 91 BPM

## 2025-08-18 LAB
ANION GAP SERPL CALCULATED.3IONS-SCNC: 10 MMOL/L (ref 9–16)
BASOPHILS # BLD: <0.03 K/UL (ref 0–0.2)
BASOPHILS NFR BLD: 0 % (ref 0–2)
BUN SERPL-MCNC: 21 MG/DL (ref 8–23)
BUN/CREAT SERPL: 35 (ref 9–20)
CALCIUM SERPL-MCNC: 9.3 MG/DL (ref 8.6–10.4)
CHLORIDE SERPL-SCNC: 101 MMOL/L (ref 98–107)
CO2 SERPL-SCNC: 30 MMOL/L (ref 20–31)
CREAT SERPL-MCNC: 0.6 MG/DL (ref 0.5–0.9)
EOSINOPHIL # BLD: <0.03 K/UL (ref 0–0.44)
EOSINOPHILS RELATIVE PERCENT: 0 % (ref 1–4)
ERYTHROCYTE [DISTWIDTH] IN BLOOD BY AUTOMATED COUNT: 14.4 % (ref 11.8–14.4)
GFR, ESTIMATED: >90 ML/MIN/1.73M2
GLUCOSE SERPL-MCNC: 159 MG/DL (ref 74–99)
HCT VFR BLD AUTO: 40.5 % (ref 36.3–47.1)
HGB BLD-MCNC: 13 G/DL (ref 11.9–15.1)
IMM GRANULOCYTES # BLD AUTO: 0.04 K/UL (ref 0–0.3)
IMM GRANULOCYTES NFR BLD: 1 %
LYMPHOCYTES NFR BLD: 0.55 K/UL (ref 1.1–3.7)
LYMPHOCYTES RELATIVE PERCENT: 9 % (ref 24–43)
MCH RBC QN AUTO: 31.7 PG (ref 25.2–33.5)
MCHC RBC AUTO-ENTMCNC: 32.1 G/DL (ref 28.4–34.8)
MCV RBC AUTO: 98.8 FL (ref 82.6–102.9)
MONOCYTES NFR BLD: 0.36 K/UL (ref 0.1–1.2)
MONOCYTES NFR BLD: 6 % (ref 3–12)
NEUTROPHILS NFR BLD: 84 % (ref 36–65)
NEUTS SEG NFR BLD: 5.04 K/UL (ref 1.5–8.1)
NRBC BLD-RTO: 0 PER 100 WBC
PLATELET # BLD AUTO: 250 K/UL (ref 138–453)
PMV BLD AUTO: 9.9 FL (ref 8.1–13.5)
POTASSIUM SERPL-SCNC: 4.4 MMOL/L (ref 3.7–5.3)
RBC # BLD AUTO: 4.1 M/UL (ref 3.95–5.11)
SODIUM SERPL-SCNC: 141 MMOL/L (ref 136–145)
WBC OTHER # BLD: 6 K/UL (ref 3.5–11.3)

## 2025-08-18 PROCEDURE — 94669 MECHANICAL CHEST WALL OSCILL: CPT

## 2025-08-18 PROCEDURE — 97110 THERAPEUTIC EXERCISES: CPT

## 2025-08-18 PROCEDURE — 97116 GAIT TRAINING THERAPY: CPT

## 2025-08-18 PROCEDURE — 85025 COMPLETE CBC W/AUTO DIFF WBC: CPT

## 2025-08-18 PROCEDURE — 6360000002 HC RX W HCPCS: Performed by: NURSE PRACTITIONER

## 2025-08-18 PROCEDURE — 94640 AIRWAY INHALATION TREATMENT: CPT

## 2025-08-18 PROCEDURE — 2700000000 HC OXYGEN THERAPY PER DAY

## 2025-08-18 PROCEDURE — 94761 N-INVAS EAR/PLS OXIMETRY MLT: CPT

## 2025-08-18 PROCEDURE — 80048 BASIC METABOLIC PNL TOTAL CA: CPT

## 2025-08-18 PROCEDURE — 2580000003 HC RX 258: Performed by: NURSE PRACTITIONER

## 2025-08-18 PROCEDURE — 97535 SELF CARE MNGMENT TRAINING: CPT

## 2025-08-18 PROCEDURE — 6370000000 HC RX 637 (ALT 250 FOR IP): Performed by: NURSE PRACTITIONER

## 2025-08-18 PROCEDURE — 6370000000 HC RX 637 (ALT 250 FOR IP): Performed by: STUDENT IN AN ORGANIZED HEALTH CARE EDUCATION/TRAINING PROGRAM

## 2025-08-18 PROCEDURE — 6370000000 HC RX 637 (ALT 250 FOR IP)

## 2025-08-18 PROCEDURE — 36415 COLL VENOUS BLD VENIPUNCTURE: CPT

## 2025-08-18 PROCEDURE — 6370000000 HC RX 637 (ALT 250 FOR IP): Performed by: INTERNAL MEDICINE

## 2025-08-18 RX ORDER — PREDNISONE 20 MG/1
20 TABLET ORAL DAILY
Status: DISCONTINUED | OUTPATIENT
Start: 2025-08-18 | End: 2025-08-18 | Stop reason: HOSPADM

## 2025-08-18 RX ORDER — PREDNISONE 10 MG/1
TABLET ORAL
Qty: 41 TABLET | Refills: 0 | Status: ON HOLD | OUTPATIENT
Start: 2025-08-18 | End: 2025-09-01

## 2025-08-18 RX ORDER — DOXYCYCLINE 100 MG/1
100 CAPSULE ORAL EVERY 12 HOURS SCHEDULED
Status: DISCONTINUED | OUTPATIENT
Start: 2025-08-18 | End: 2025-08-18 | Stop reason: HOSPADM

## 2025-08-18 RX ORDER — DOXYCYCLINE 100 MG/1
100 CAPSULE ORAL EVERY 12 HOURS SCHEDULED
Qty: 20 CAPSULE | Refills: 0 | Status: ON HOLD | OUTPATIENT
Start: 2025-08-18 | End: 2025-08-28

## 2025-08-18 RX ORDER — PREDNISONE 20 MG/1
20 TABLET ORAL DAILY
Status: DISCONTINUED | OUTPATIENT
Start: 2025-08-18 | End: 2025-08-18

## 2025-08-18 RX ADMIN — DILTIAZEM HYDROCHLORIDE 120 MG: 120 CAPSULE, COATED, EXTENDED RELEASE ORAL at 10:21

## 2025-08-18 RX ADMIN — POLYETHYLENE GLYCOL 3350 17 G: 17 POWDER, FOR SOLUTION ORAL at 10:21

## 2025-08-18 RX ADMIN — BUPROPION HYDROCHLORIDE 300 MG: 150 TABLET, EXTENDED RELEASE ORAL at 10:21

## 2025-08-18 RX ADMIN — IPRATROPIUM BROMIDE AND ALBUTEROL SULFATE 1 DOSE: .5; 2.5 SOLUTION RESPIRATORY (INHALATION) at 10:08

## 2025-08-18 RX ADMIN — CITALOPRAM HYDROBROMIDE 40 MG: 20 TABLET ORAL at 10:19

## 2025-08-18 RX ADMIN — SPIRONOLACTONE 50 MG: 25 TABLET ORAL at 10:20

## 2025-08-18 RX ADMIN — RIVAROXABAN 20 MG: 20 TABLET, FILM COATED ORAL at 10:21

## 2025-08-18 RX ADMIN — LEVOTHYROXINE SODIUM 200 MCG: 0.1 TABLET ORAL at 08:04

## 2025-08-18 RX ADMIN — DIGOXIN 125 MCG: 125 TABLET ORAL at 10:21

## 2025-08-18 RX ADMIN — PREGABALIN 300 MG: 75 CAPSULE ORAL at 10:21

## 2025-08-18 RX ADMIN — OXYCODONE HYDROCHLORIDE AND ACETAMINOPHEN 500 MG: 500 TABLET ORAL at 10:20

## 2025-08-18 RX ADMIN — POTASSIUM CHLORIDE 20 MEQ: 1500 TABLET, EXTENDED RELEASE ORAL at 10:20

## 2025-08-18 RX ADMIN — DIPHENHYDRAMINE HYDROCHLORIDE 25 MG: 25 CAPSULE ORAL at 11:01

## 2025-08-18 RX ADMIN — GUAIFENESIN 600 MG: 600 TABLET, EXTENDED RELEASE ORAL at 10:20

## 2025-08-18 RX ADMIN — Medication 1 TABLET: at 10:20

## 2025-08-18 RX ADMIN — BUMETANIDE 2 MG: 1 TABLET ORAL at 10:19

## 2025-08-18 RX ADMIN — DOXYCYCLINE HYCLATE 100 MG: 100 CAPSULE ORAL at 04:07

## 2025-08-18 RX ADMIN — PREDNISONE 20 MG: 20 TABLET ORAL at 08:04

## 2025-08-18 RX ADMIN — IPRATROPIUM BROMIDE AND ALBUTEROL SULFATE 1 DOSE: .5; 2.5 SOLUTION RESPIRATORY (INHALATION) at 04:44

## 2025-08-18 RX ADMIN — DULOXETINE 60 MG: 60 CAPSULE, DELAYED RELEASE ORAL at 10:20

## 2025-08-18 RX ADMIN — ASPIRIN 81 MG: 81 TABLET, CHEWABLE ORAL at 10:21

## 2025-08-18 RX ADMIN — DOXYCYCLINE 100 MG: 100 INJECTION, POWDER, LYOPHILIZED, FOR SOLUTION INTRAVENOUS at 03:03

## 2025-08-18 ASSESSMENT — PAIN SCALES - GENERAL: PAINLEVEL_OUTOF10: 0

## 2025-08-19 ENCOUNTER — CARE COORDINATION (OUTPATIENT)
Dept: CARE COORDINATION | Age: 80
End: 2025-08-19

## 2025-08-20 ENCOUNTER — CARE COORDINATION (OUTPATIENT)
Dept: CARE COORDINATION | Age: 80
End: 2025-08-20

## 2025-08-20 DIAGNOSIS — Z99.81 OXYGEN DEPENDENT: Primary | Chronic | ICD-10-CM

## 2025-08-20 PROCEDURE — 1111F DSCHRG MED/CURRENT MED MERGE: CPT | Performed by: INTERNAL MEDICINE

## 2025-08-20 RX ORDER — DIPHENHYDRAMINE HCL 25 MG
25 TABLET ORAL EVERY 6 HOURS PRN
Status: ON HOLD | COMMUNITY

## 2025-08-22 ENCOUNTER — CARE COORDINATION (OUTPATIENT)
Dept: CASE MANAGEMENT | Age: 80
End: 2025-08-22

## 2025-08-23 ENCOUNTER — APPOINTMENT (OUTPATIENT)
Dept: GENERAL RADIOLOGY | Age: 80
DRG: 303 | End: 2025-08-23
Attending: EMERGENCY MEDICINE
Payer: MEDICARE

## 2025-08-23 ENCOUNTER — HOSPITAL ENCOUNTER (INPATIENT)
Age: 80
LOS: 2 days | Discharge: HOME | DRG: 303 | End: 2025-08-25
Attending: EMERGENCY MEDICINE | Admitting: INTERNAL MEDICINE
Payer: MEDICARE

## 2025-08-23 DIAGNOSIS — R07.9 CHEST PAIN, UNSPECIFIED TYPE: Primary | ICD-10-CM

## 2025-08-23 DIAGNOSIS — I20.9 ANGINA PECTORIS: ICD-10-CM

## 2025-08-23 PROBLEM — I20.0 UNSTABLE ANGINA (HCC): Status: ACTIVE | Noted: 2025-08-23

## 2025-08-23 LAB
ALBUMIN SERPL-MCNC: 3.9 G/DL (ref 3.5–5.2)
ALBUMIN/GLOB SERPL: 1.6 {RATIO} (ref 1–2.5)
ALP SERPL-CCNC: 75 U/L (ref 35–104)
ALT SERPL-CCNC: 20 U/L (ref 10–35)
ANION GAP SERPL CALCULATED.3IONS-SCNC: 12 MMOL/L (ref 9–16)
AST SERPL-CCNC: 20 U/L (ref 10–35)
BASOPHILS # BLD: <0.03 K/UL (ref 0–0.2)
BASOPHILS NFR BLD: 0 % (ref 0–2)
BILIRUB SERPL-MCNC: 0.3 MG/DL (ref 0–1.2)
BUN SERPL-MCNC: 23 MG/DL (ref 8–23)
BUN/CREAT SERPL: 33 (ref 9–20)
CALCIUM SERPL-MCNC: 8.9 MG/DL (ref 8.6–10.4)
CHLORIDE SERPL-SCNC: 96 MMOL/L (ref 98–107)
CO2 SERPL-SCNC: 33 MMOL/L (ref 20–31)
CREAT SERPL-MCNC: 0.7 MG/DL (ref 0.5–0.9)
DIGOXIN SERPL-MCNC: <0.3 NG/ML (ref 0.8–2)
EOSINOPHIL # BLD: 0.09 K/UL (ref 0–0.44)
EOSINOPHILS RELATIVE PERCENT: 1 % (ref 1–4)
ERYTHROCYTE [DISTWIDTH] IN BLOOD BY AUTOMATED COUNT: 13.9 % (ref 11.8–14.4)
GFR, ESTIMATED: 89 ML/MIN/1.73M2
GLUCOSE SERPL-MCNC: 91 MG/DL (ref 74–99)
HCT VFR BLD AUTO: 41.8 % (ref 36.3–47.1)
HGB BLD-MCNC: 13.7 G/DL (ref 11.9–15.1)
IMM GRANULOCYTES # BLD AUTO: 0.08 K/UL (ref 0–0.3)
IMM GRANULOCYTES NFR BLD: 1 %
LYMPHOCYTES NFR BLD: 2.64 K/UL (ref 1.1–3.7)
LYMPHOCYTES RELATIVE PERCENT: 27 % (ref 24–43)
MAGNESIUM SERPL-MCNC: 2.3 MG/DL (ref 1.6–2.4)
MCH RBC QN AUTO: 31.4 PG (ref 25.2–33.5)
MCHC RBC AUTO-ENTMCNC: 32.8 G/DL (ref 28.4–34.8)
MCV RBC AUTO: 95.9 FL (ref 82.6–102.9)
MONOCYTES NFR BLD: 0.91 K/UL (ref 0.1–1.2)
MONOCYTES NFR BLD: 9 % (ref 3–12)
NEUTROPHILS NFR BLD: 62 % (ref 36–65)
NEUTS SEG NFR BLD: 6.21 K/UL (ref 1.5–8.1)
NRBC BLD-RTO: 0 PER 100 WBC
PLATELET # BLD AUTO: 303 K/UL (ref 138–453)
PMV BLD AUTO: 9.4 FL (ref 8.1–13.5)
POTASSIUM SERPL-SCNC: 4.2 MMOL/L (ref 3.7–5.3)
PROT SERPL-MCNC: 6.4 G/DL (ref 6.6–8.7)
RBC # BLD AUTO: 4.36 M/UL (ref 3.95–5.11)
SODIUM SERPL-SCNC: 141 MMOL/L (ref 136–145)
TROPONIN I SERPL HS-MCNC: 10 NG/L (ref 0–14)
TROPONIN I SERPL HS-MCNC: 11 NG/L (ref 0–14)
WBC OTHER # BLD: 10 K/UL (ref 3.5–11.3)

## 2025-08-23 PROCEDURE — 85025 COMPLETE CBC W/AUTO DIFF WBC: CPT

## 2025-08-23 PROCEDURE — 2700000000 HC OXYGEN THERAPY PER DAY

## 2025-08-23 PROCEDURE — 84484 ASSAY OF TROPONIN QUANT: CPT

## 2025-08-23 PROCEDURE — 94640 AIRWAY INHALATION TREATMENT: CPT

## 2025-08-23 PROCEDURE — 6370000000 HC RX 637 (ALT 250 FOR IP): Performed by: EMERGENCY MEDICINE

## 2025-08-23 PROCEDURE — 6370000000 HC RX 637 (ALT 250 FOR IP): Performed by: INTERNAL MEDICINE

## 2025-08-23 PROCEDURE — 93005 ELECTROCARDIOGRAM TRACING: CPT | Performed by: EMERGENCY MEDICINE

## 2025-08-23 PROCEDURE — 80053 COMPREHEN METABOLIC PANEL: CPT

## 2025-08-23 PROCEDURE — 2500000003 HC RX 250 WO HCPCS: Performed by: INTERNAL MEDICINE

## 2025-08-23 PROCEDURE — 94664 DEMO&/EVAL PT USE INHALER: CPT

## 2025-08-23 PROCEDURE — 99285 EMERGENCY DEPT VISIT HI MDM: CPT

## 2025-08-23 PROCEDURE — 6360000002 HC RX W HCPCS: Performed by: EMERGENCY MEDICINE

## 2025-08-23 PROCEDURE — 80162 ASSAY OF DIGOXIN TOTAL: CPT

## 2025-08-23 PROCEDURE — 1200000000 HC SEMI PRIVATE

## 2025-08-23 PROCEDURE — 71045 X-RAY EXAM CHEST 1 VIEW: CPT

## 2025-08-23 PROCEDURE — 96374 THER/PROPH/DIAG INJ IV PUSH: CPT

## 2025-08-23 PROCEDURE — 94761 N-INVAS EAR/PLS OXIMETRY MLT: CPT

## 2025-08-23 PROCEDURE — 83735 ASSAY OF MAGNESIUM: CPT

## 2025-08-23 RX ORDER — ACETAMINOPHEN 650 MG/1
650 SUPPOSITORY RECTAL EVERY 6 HOURS PRN
Status: DISCONTINUED | OUTPATIENT
Start: 2025-08-23 | End: 2025-08-25 | Stop reason: HOSPADM

## 2025-08-23 RX ORDER — ALBUTEROL SULFATE 90 UG/1
2 INHALANT RESPIRATORY (INHALATION) EVERY 6 HOURS PRN
Status: DISCONTINUED | OUTPATIENT
Start: 2025-08-23 | End: 2025-08-25 | Stop reason: HOSPADM

## 2025-08-23 RX ORDER — TRAZODONE HYDROCHLORIDE 50 MG/1
200 TABLET ORAL NIGHTLY
Status: DISCONTINUED | OUTPATIENT
Start: 2025-08-23 | End: 2025-08-25 | Stop reason: HOSPADM

## 2025-08-23 RX ORDER — IPRATROPIUM BROMIDE AND ALBUTEROL SULFATE 2.5; .5 MG/3ML; MG/3ML
1 SOLUTION RESPIRATORY (INHALATION)
Status: DISCONTINUED | OUTPATIENT
Start: 2025-08-23 | End: 2025-08-24

## 2025-08-23 RX ORDER — ACETAMINOPHEN 325 MG/1
650 TABLET ORAL EVERY 6 HOURS PRN
Status: DISCONTINUED | OUTPATIENT
Start: 2025-08-23 | End: 2025-08-25 | Stop reason: HOSPADM

## 2025-08-23 RX ORDER — PREDNISONE 10 MG/1
10 TABLET ORAL DAILY
Status: DISCONTINUED | OUTPATIENT
Start: 2025-08-27 | End: 2025-08-25 | Stop reason: HOSPADM

## 2025-08-23 RX ORDER — BUMETANIDE 1 MG/1
2 TABLET ORAL 2 TIMES DAILY
Status: DISCONTINUED | OUTPATIENT
Start: 2025-08-23 | End: 2025-08-25 | Stop reason: HOSPADM

## 2025-08-23 RX ORDER — VITS A,C,E/LUTEIN/MINERALS 300MCG-200
1 TABLET ORAL DAILY
Status: DISCONTINUED | OUTPATIENT
Start: 2025-08-23 | End: 2025-08-25 | Stop reason: HOSPADM

## 2025-08-23 RX ORDER — HYDROCODONE BITARTRATE AND ACETAMINOPHEN 10; 325 MG/1; MG/1
1 TABLET ORAL EVERY 6 HOURS PRN
Status: DISCONTINUED | OUTPATIENT
Start: 2025-08-23 | End: 2025-08-25 | Stop reason: HOSPADM

## 2025-08-23 RX ORDER — ASPIRIN 81 MG/1
324 TABLET, CHEWABLE ORAL ONCE
Status: COMPLETED | OUTPATIENT
Start: 2025-08-23 | End: 2025-08-23

## 2025-08-23 RX ORDER — PREDNISONE 20 MG/1
20 TABLET ORAL 2 TIMES DAILY
Status: COMPLETED | OUTPATIENT
Start: 2025-08-23 | End: 2025-08-24

## 2025-08-23 RX ORDER — BUPROPION HYDROCHLORIDE 150 MG/1
300 TABLET ORAL EVERY MORNING
Status: DISCONTINUED | OUTPATIENT
Start: 2025-08-24 | End: 2025-08-25 | Stop reason: HOSPADM

## 2025-08-23 RX ORDER — POLYETHYLENE GLYCOL 3350 17 G/17G
17 POWDER, FOR SOLUTION ORAL DAILY PRN
Status: DISCONTINUED | OUTPATIENT
Start: 2025-08-23 | End: 2025-08-25 | Stop reason: HOSPADM

## 2025-08-23 RX ORDER — ALENDRONATE SODIUM 70 MG/1
70 TABLET ORAL WEEKLY
Status: DISCONTINUED | OUTPATIENT
Start: 2025-08-24 | End: 2025-08-23

## 2025-08-23 RX ORDER — PREDNISONE 10 MG/1
10 TABLET ORAL 2 TIMES DAILY
Status: DISCONTINUED | OUTPATIENT
Start: 2025-08-24 | End: 2025-08-25 | Stop reason: HOSPADM

## 2025-08-23 RX ORDER — MAGNESIUM SULFATE IN WATER 40 MG/ML
2000 INJECTION, SOLUTION INTRAVENOUS PRN
Status: DISCONTINUED | OUTPATIENT
Start: 2025-08-23 | End: 2025-08-25 | Stop reason: HOSPADM

## 2025-08-23 RX ORDER — SPIRONOLACTONE 25 MG/1
50 TABLET ORAL DAILY
Status: DISCONTINUED | OUTPATIENT
Start: 2025-08-23 | End: 2025-08-25 | Stop reason: HOSPADM

## 2025-08-23 RX ORDER — SODIUM CHLORIDE 0.9 % (FLUSH) 0.9 %
5-40 SYRINGE (ML) INJECTION EVERY 12 HOURS SCHEDULED
Status: DISCONTINUED | OUTPATIENT
Start: 2025-08-23 | End: 2025-08-25 | Stop reason: HOSPADM

## 2025-08-23 RX ORDER — ASPIRIN 81 MG/1
81 TABLET ORAL DAILY
Status: DISCONTINUED | OUTPATIENT
Start: 2025-08-24 | End: 2025-08-25 | Stop reason: HOSPADM

## 2025-08-23 RX ORDER — LEVOTHYROXINE SODIUM 100 UG/1
200 TABLET ORAL DAILY
Status: DISCONTINUED | OUTPATIENT
Start: 2025-08-23 | End: 2025-08-25 | Stop reason: HOSPADM

## 2025-08-23 RX ORDER — SODIUM CHLORIDE 9 MG/ML
INJECTION, SOLUTION INTRAVENOUS PRN
Status: DISCONTINUED | OUTPATIENT
Start: 2025-08-23 | End: 2025-08-25 | Stop reason: HOSPADM

## 2025-08-23 RX ORDER — ONDANSETRON 4 MG/1
4 TABLET, ORALLY DISINTEGRATING ORAL EVERY 8 HOURS PRN
Status: DISCONTINUED | OUTPATIENT
Start: 2025-08-23 | End: 2025-08-25 | Stop reason: HOSPADM

## 2025-08-23 RX ORDER — ASCORBIC ACID 500 MG
500 TABLET ORAL DAILY
Status: DISCONTINUED | OUTPATIENT
Start: 2025-08-24 | End: 2025-08-25 | Stop reason: HOSPADM

## 2025-08-23 RX ORDER — DIGOXIN 125 MCG
125 TABLET ORAL DAILY
Status: DISCONTINUED | OUTPATIENT
Start: 2025-08-23 | End: 2025-08-25 | Stop reason: HOSPADM

## 2025-08-23 RX ORDER — DOXYCYCLINE 100 MG/1
100 CAPSULE ORAL EVERY 12 HOURS SCHEDULED
Status: DISCONTINUED | OUTPATIENT
Start: 2025-08-23 | End: 2025-08-25 | Stop reason: HOSPADM

## 2025-08-23 RX ORDER — ONDANSETRON 2 MG/ML
4 INJECTION INTRAMUSCULAR; INTRAVENOUS ONCE
Status: COMPLETED | OUTPATIENT
Start: 2025-08-23 | End: 2025-08-23

## 2025-08-23 RX ORDER — POTASSIUM CHLORIDE 1500 MG/1
20 TABLET, EXTENDED RELEASE ORAL 2 TIMES DAILY
Status: DISCONTINUED | OUTPATIENT
Start: 2025-08-23 | End: 2025-08-25 | Stop reason: HOSPADM

## 2025-08-23 RX ORDER — POTASSIUM CHLORIDE 7.45 MG/ML
10 INJECTION INTRAVENOUS PRN
Status: DISCONTINUED | OUTPATIENT
Start: 2025-08-23 | End: 2025-08-25 | Stop reason: HOSPADM

## 2025-08-23 RX ORDER — POLYETHYLENE GLYCOL 3350 17 G/17G
17 POWDER, FOR SOLUTION ORAL 2 TIMES DAILY
Status: DISCONTINUED | OUTPATIENT
Start: 2025-08-23 | End: 2025-08-25 | Stop reason: HOSPADM

## 2025-08-23 RX ORDER — CITALOPRAM HYDROBROMIDE 20 MG/1
40 TABLET ORAL DAILY
Status: DISCONTINUED | OUTPATIENT
Start: 2025-08-23 | End: 2025-08-25 | Stop reason: HOSPADM

## 2025-08-23 RX ORDER — DULOXETIN HYDROCHLORIDE 60 MG/1
60 CAPSULE, DELAYED RELEASE ORAL DAILY
Status: DISCONTINUED | OUTPATIENT
Start: 2025-08-23 | End: 2025-08-25 | Stop reason: HOSPADM

## 2025-08-23 RX ORDER — ONDANSETRON 2 MG/ML
4 INJECTION INTRAMUSCULAR; INTRAVENOUS EVERY 6 HOURS PRN
Status: DISCONTINUED | OUTPATIENT
Start: 2025-08-23 | End: 2025-08-25 | Stop reason: HOSPADM

## 2025-08-23 RX ORDER — POTASSIUM CHLORIDE 1500 MG/1
40 TABLET, EXTENDED RELEASE ORAL PRN
Status: DISCONTINUED | OUTPATIENT
Start: 2025-08-23 | End: 2025-08-25 | Stop reason: HOSPADM

## 2025-08-23 RX ORDER — SODIUM CHLORIDE 0.9 % (FLUSH) 0.9 %
10 SYRINGE (ML) INJECTION PRN
Status: DISCONTINUED | OUTPATIENT
Start: 2025-08-23 | End: 2025-08-25 | Stop reason: HOSPADM

## 2025-08-23 RX ORDER — NITROGLYCERIN 0.4 MG/1
0.4 TABLET SUBLINGUAL PRN
Status: DISCONTINUED | OUTPATIENT
Start: 2025-08-23 | End: 2025-08-25 | Stop reason: HOSPADM

## 2025-08-23 RX ORDER — ATORVASTATIN CALCIUM 40 MG/1
40 TABLET, FILM COATED ORAL NIGHTLY
Status: DISCONTINUED | OUTPATIENT
Start: 2025-08-23 | End: 2025-08-25 | Stop reason: HOSPADM

## 2025-08-23 RX ORDER — DILTIAZEM HYDROCHLORIDE 120 MG/1
120 CAPSULE, COATED, EXTENDED RELEASE ORAL DAILY
Status: DISCONTINUED | OUTPATIENT
Start: 2025-08-23 | End: 2025-08-25 | Stop reason: HOSPADM

## 2025-08-23 RX ORDER — PREGABALIN 75 MG/1
300 CAPSULE ORAL 2 TIMES DAILY
Status: DISCONTINUED | OUTPATIENT
Start: 2025-08-23 | End: 2025-08-25 | Stop reason: HOSPADM

## 2025-08-23 RX ORDER — IPRATROPIUM BROMIDE AND ALBUTEROL SULFATE 2.5; .5 MG/3ML; MG/3ML
1 SOLUTION RESPIRATORY (INHALATION)
Status: DISCONTINUED | OUTPATIENT
Start: 2025-08-23 | End: 2025-08-23

## 2025-08-23 RX ADMIN — Medication 1 TABLET: at 16:17

## 2025-08-23 RX ADMIN — SODIUM CHLORIDE, PRESERVATIVE FREE 5 ML: 5 INJECTION INTRAVENOUS at 21:03

## 2025-08-23 RX ADMIN — CITALOPRAM 40 MG: 20 TABLET, FILM COATED ORAL at 15:45

## 2025-08-23 RX ADMIN — PREDNISONE 20 MG: 20 TABLET ORAL at 20:51

## 2025-08-23 RX ADMIN — SODIUM CHLORIDE, PRESERVATIVE FREE 10 ML: 5 INJECTION INTRAVENOUS at 15:46

## 2025-08-23 RX ADMIN — LEVOTHYROXINE SODIUM 200 MCG: 0.1 TABLET ORAL at 16:17

## 2025-08-23 RX ADMIN — SPIRONOLACTONE 50 MG: 25 TABLET ORAL at 16:17

## 2025-08-23 RX ADMIN — DULOXETINE 60 MG: 60 CAPSULE, DELAYED RELEASE ORAL at 15:45

## 2025-08-23 RX ADMIN — ONDANSETRON 4 MG: 2 INJECTION, SOLUTION INTRAMUSCULAR; INTRAVENOUS at 12:29

## 2025-08-23 RX ADMIN — RIVAROXABAN 20 MG: 20 TABLET, FILM COATED ORAL at 16:17

## 2025-08-23 RX ADMIN — PREGABALIN 300 MG: 75 CAPSULE ORAL at 20:51

## 2025-08-23 RX ADMIN — NITROGLYCERIN 0.4 MG: 0.4 TABLET SUBLINGUAL at 12:31

## 2025-08-23 RX ADMIN — IPRATROPIUM BROMIDE AND ALBUTEROL SULFATE 1 DOSE: .5; 2.5 SOLUTION RESPIRATORY (INHALATION) at 20:38

## 2025-08-23 RX ADMIN — DOXYCYCLINE HYCLATE 100 MG: 100 CAPSULE ORAL at 20:52

## 2025-08-23 RX ADMIN — Medication 1 TABLET: at 15:45

## 2025-08-23 RX ADMIN — ASPIRIN 324 MG: 81 TABLET, CHEWABLE ORAL at 14:44

## 2025-08-23 RX ADMIN — DILTIAZEM HYDROCHLORIDE 120 MG: 120 CAPSULE, COATED, EXTENDED RELEASE ORAL at 15:45

## 2025-08-23 RX ADMIN — IPRATROPIUM BROMIDE AND ALBUTEROL SULFATE 1 DOSE: .5; 2.5 SOLUTION RESPIRATORY (INHALATION) at 15:58

## 2025-08-23 RX ADMIN — DIGOXIN 125 MCG: 125 TABLET ORAL at 15:45

## 2025-08-23 RX ADMIN — ATORVASTATIN CALCIUM 40 MG: 40 TABLET, FILM COATED ORAL at 20:52

## 2025-08-23 ASSESSMENT — PAIN DESCRIPTION - DESCRIPTORS: DESCRIPTORS: DISCOMFORT

## 2025-08-23 ASSESSMENT — PAIN SCALES - GENERAL
PAINLEVEL_OUTOF10: 8
PAINLEVEL_OUTOF10: 8
PAINLEVEL_OUTOF10: 0
PAINLEVEL_OUTOF10: 0
PAINLEVEL_OUTOF10: 7
PAINLEVEL_OUTOF10: 2

## 2025-08-23 ASSESSMENT — PAIN DESCRIPTION - LOCATION
LOCATION: CHEST
LOCATION: CHEST

## 2025-08-23 ASSESSMENT — PAIN - FUNCTIONAL ASSESSMENT
PAIN_FUNCTIONAL_ASSESSMENT: 0-10
PAIN_FUNCTIONAL_ASSESSMENT: 0-10

## 2025-08-23 ASSESSMENT — PAIN DESCRIPTION - PAIN TYPE: TYPE: ACUTE PAIN

## 2025-08-23 ASSESSMENT — LIFESTYLE VARIABLES
HOW OFTEN DO YOU HAVE A DRINK CONTAINING ALCOHOL: NEVER
HOW MANY STANDARD DRINKS CONTAINING ALCOHOL DO YOU HAVE ON A TYPICAL DAY: PATIENT DOES NOT DRINK
HOW OFTEN DO YOU HAVE A DRINK CONTAINING ALCOHOL: NEVER

## 2025-08-24 LAB
ALBUMIN SERPL-MCNC: 3.6 G/DL (ref 3.5–5.2)
ALBUMIN/GLOB SERPL: 1.6 {RATIO} (ref 1–2.5)
ALP SERPL-CCNC: 71 U/L (ref 35–104)
ALT SERPL-CCNC: 16 U/L (ref 10–35)
ANION GAP SERPL CALCULATED.3IONS-SCNC: 13 MMOL/L (ref 9–16)
AST SERPL-CCNC: 17 U/L (ref 10–35)
BASOPHILS # BLD: <0.03 K/UL (ref 0–0.2)
BASOPHILS NFR BLD: 0 % (ref 0–2)
BILIRUB SERPL-MCNC: 0.4 MG/DL (ref 0–1.2)
BUN SERPL-MCNC: 19 MG/DL (ref 8–23)
BUN/CREAT SERPL: 27 (ref 9–20)
CALCIUM SERPL-MCNC: 8.7 MG/DL (ref 8.6–10.4)
CHLORIDE SERPL-SCNC: 99 MMOL/L (ref 98–107)
CO2 SERPL-SCNC: 27 MMOL/L (ref 20–31)
CREAT SERPL-MCNC: 0.7 MG/DL (ref 0.5–0.9)
EKG ATRIAL RATE: 60 BPM
EKG P AXIS: 57 DEGREES
EKG P-R INTERVAL: 172 MS
EKG Q-T INTERVAL: 372 MS
EKG Q-T INTERVAL: 408 MS
EKG QRS DURATION: 78 MS
EKG QRS DURATION: 82 MS
EKG QTC CALCULATION (BAZETT): 408 MS
EKG QTC CALCULATION (BAZETT): 442 MS
EKG R AXIS: -12 DEGREES
EKG R AXIS: -17 DEGREES
EKG T AXIS: 27 DEGREES
EKG T AXIS: 39 DEGREES
EKG VENTRICULAR RATE: 60 BPM
EKG VENTRICULAR RATE: 85 BPM
EOSINOPHIL # BLD: 0.03 K/UL (ref 0–0.44)
EOSINOPHILS RELATIVE PERCENT: 0 % (ref 1–4)
ERYTHROCYTE [DISTWIDTH] IN BLOOD BY AUTOMATED COUNT: 14.3 % (ref 11.8–14.4)
GFR, ESTIMATED: 88 ML/MIN/1.73M2
GLUCOSE SERPL-MCNC: 182 MG/DL (ref 74–99)
HCT VFR BLD AUTO: 42.3 % (ref 36.3–47.1)
HGB BLD-MCNC: 13.4 G/DL (ref 11.9–15.1)
IMM GRANULOCYTES # BLD AUTO: 0.06 K/UL (ref 0–0.3)
IMM GRANULOCYTES NFR BLD: 1 %
LYMPHOCYTES NFR BLD: 1.09 K/UL (ref 1.1–3.7)
LYMPHOCYTES RELATIVE PERCENT: 11 % (ref 24–43)
MCH RBC QN AUTO: 31.3 PG (ref 25.2–33.5)
MCHC RBC AUTO-ENTMCNC: 31.7 G/DL (ref 28.4–34.8)
MCV RBC AUTO: 98.8 FL (ref 82.6–102.9)
MONOCYTES NFR BLD: 0.43 K/UL (ref 0.1–1.2)
MONOCYTES NFR BLD: 4 % (ref 3–12)
NEUTROPHILS NFR BLD: 84 % (ref 36–65)
NEUTS SEG NFR BLD: 8.38 K/UL (ref 1.5–8.1)
NRBC BLD-RTO: 0 PER 100 WBC
PLATELET # BLD AUTO: 291 K/UL (ref 138–453)
PMV BLD AUTO: 9.1 FL (ref 8.1–13.5)
POTASSIUM SERPL-SCNC: 4.7 MMOL/L (ref 3.7–5.3)
PROT SERPL-MCNC: 5.9 G/DL (ref 6.6–8.7)
RBC # BLD AUTO: 4.28 M/UL (ref 3.95–5.11)
SODIUM SERPL-SCNC: 139 MMOL/L (ref 136–145)
TROPONIN I SERPL HS-MCNC: 8 NG/L (ref 0–14)
TROPONIN I SERPL HS-MCNC: 9 NG/L (ref 0–14)
WBC OTHER # BLD: 10 K/UL (ref 3.5–11.3)

## 2025-08-24 PROCEDURE — 6370000000 HC RX 637 (ALT 250 FOR IP): Performed by: EMERGENCY MEDICINE

## 2025-08-24 PROCEDURE — 93010 ELECTROCARDIOGRAM REPORT: CPT | Performed by: INTERNAL MEDICINE

## 2025-08-24 PROCEDURE — 94664 DEMO&/EVAL PT USE INHALER: CPT

## 2025-08-24 PROCEDURE — 36415 COLL VENOUS BLD VENIPUNCTURE: CPT

## 2025-08-24 PROCEDURE — 6370000000 HC RX 637 (ALT 250 FOR IP): Performed by: INTERNAL MEDICINE

## 2025-08-24 PROCEDURE — 93005 ELECTROCARDIOGRAM TRACING: CPT | Performed by: INTERNAL MEDICINE

## 2025-08-24 PROCEDURE — 80053 COMPREHEN METABOLIC PANEL: CPT

## 2025-08-24 PROCEDURE — 94761 N-INVAS EAR/PLS OXIMETRY MLT: CPT

## 2025-08-24 PROCEDURE — 5A09357 ASSISTANCE WITH RESPIRATORY VENTILATION, LESS THAN 24 CONSECUTIVE HOURS, CONTINUOUS POSITIVE AIRWAY PRESSURE: ICD-10-PCS | Performed by: INTERNAL MEDICINE

## 2025-08-24 PROCEDURE — 1200000000 HC SEMI PRIVATE

## 2025-08-24 PROCEDURE — 2700000000 HC OXYGEN THERAPY PER DAY

## 2025-08-24 PROCEDURE — 84484 ASSAY OF TROPONIN QUANT: CPT

## 2025-08-24 PROCEDURE — 94669 MECHANICAL CHEST WALL OSCILL: CPT

## 2025-08-24 PROCEDURE — 94640 AIRWAY INHALATION TREATMENT: CPT

## 2025-08-24 PROCEDURE — 85025 COMPLETE CBC W/AUTO DIFF WBC: CPT

## 2025-08-24 PROCEDURE — 2500000003 HC RX 250 WO HCPCS: Performed by: INTERNAL MEDICINE

## 2025-08-24 RX ORDER — IPRATROPIUM BROMIDE AND ALBUTEROL SULFATE 2.5; .5 MG/3ML; MG/3ML
1 SOLUTION RESPIRATORY (INHALATION)
Status: DISCONTINUED | OUTPATIENT
Start: 2025-08-24 | End: 2025-08-25 | Stop reason: HOSPADM

## 2025-08-24 RX ORDER — NYSTATIN 100000 [USP'U]/ML
5 SUSPENSION ORAL 4 TIMES DAILY
Status: DISCONTINUED | OUTPATIENT
Start: 2025-08-24 | End: 2025-08-25 | Stop reason: HOSPADM

## 2025-08-24 RX ADMIN — SPIRONOLACTONE 50 MG: 25 TABLET ORAL at 07:37

## 2025-08-24 RX ADMIN — SODIUM CHLORIDE, PRESERVATIVE FREE 10 ML: 5 INJECTION INTRAVENOUS at 21:29

## 2025-08-24 RX ADMIN — PREGABALIN 300 MG: 75 CAPSULE ORAL at 21:24

## 2025-08-24 RX ADMIN — HYDROCODONE BITARTRATE AND ACETAMINOPHEN 1 TABLET: 10; 325 TABLET ORAL at 09:35

## 2025-08-24 RX ADMIN — DULOXETINE 60 MG: 60 CAPSULE, DELAYED RELEASE ORAL at 07:35

## 2025-08-24 RX ADMIN — DOXYCYCLINE HYCLATE 100 MG: 100 CAPSULE ORAL at 07:38

## 2025-08-24 RX ADMIN — DOXYCYCLINE HYCLATE 100 MG: 100 CAPSULE ORAL at 21:24

## 2025-08-24 RX ADMIN — NYSTATIN 500000 UNITS: 100000 SUSPENSION ORAL at 21:24

## 2025-08-24 RX ADMIN — OXYCODONE HYDROCHLORIDE AND ACETAMINOPHEN 500 MG: 500 TABLET ORAL at 07:43

## 2025-08-24 RX ADMIN — POLYETHYLENE GLYCOL 3350 17 G: 17 POWDER, FOR SOLUTION ORAL at 21:21

## 2025-08-24 RX ADMIN — POTASSIUM CHLORIDE 20 MEQ: 1500 TABLET, EXTENDED RELEASE ORAL at 07:37

## 2025-08-24 RX ADMIN — IPRATROPIUM BROMIDE AND ALBUTEROL SULFATE 1 DOSE: .5; 2.5 SOLUTION RESPIRATORY (INHALATION) at 15:53

## 2025-08-24 RX ADMIN — IPRATROPIUM BROMIDE AND ALBUTEROL SULFATE 1 DOSE: .5; 2.5 SOLUTION RESPIRATORY (INHALATION) at 20:12

## 2025-08-24 RX ADMIN — TRAZODONE HYDROCHLORIDE 200 MG: 50 TABLET ORAL at 22:31

## 2025-08-24 RX ADMIN — NYSTATIN 500000 UNITS: 100000 SUSPENSION ORAL at 13:51

## 2025-08-24 RX ADMIN — ATORVASTATIN CALCIUM 40 MG: 40 TABLET, FILM COATED ORAL at 21:24

## 2025-08-24 RX ADMIN — DILTIAZEM HYDROCHLORIDE 120 MG: 120 CAPSULE, COATED, EXTENDED RELEASE ORAL at 07:37

## 2025-08-24 RX ADMIN — PREDNISONE 10 MG: 10 TABLET ORAL at 21:24

## 2025-08-24 RX ADMIN — HYDROCODONE BITARTRATE AND ACETAMINOPHEN 1 TABLET: 10; 325 TABLET ORAL at 21:29

## 2025-08-24 RX ADMIN — NYSTATIN 500000 UNITS: 100000 SUSPENSION ORAL at 16:45

## 2025-08-24 RX ADMIN — IPRATROPIUM BROMIDE AND ALBUTEROL SULFATE 1 DOSE: .5; 2.5 SOLUTION RESPIRATORY (INHALATION) at 10:18

## 2025-08-24 RX ADMIN — ASPIRIN 81 MG: 81 TABLET, COATED ORAL at 07:38

## 2025-08-24 RX ADMIN — LEVOTHYROXINE SODIUM 200 MCG: 0.1 TABLET ORAL at 07:37

## 2025-08-24 RX ADMIN — PREGABALIN 300 MG: 75 CAPSULE ORAL at 07:43

## 2025-08-24 RX ADMIN — PREDNISONE 20 MG: 20 TABLET ORAL at 07:37

## 2025-08-24 RX ADMIN — Medication 1 TABLET: at 07:35

## 2025-08-24 RX ADMIN — Medication 1 TABLET: at 07:43

## 2025-08-24 RX ADMIN — BUPROPION HYDROCHLORIDE 300 MG: 150 TABLET, EXTENDED RELEASE ORAL at 07:36

## 2025-08-24 RX ADMIN — DIGOXIN 125 MCG: 125 TABLET ORAL at 07:37

## 2025-08-24 RX ADMIN — RIVAROXABAN 20 MG: 20 TABLET, FILM COATED ORAL at 07:37

## 2025-08-24 RX ADMIN — NITROGLYCERIN 0.4 MG: 0.4 TABLET SUBLINGUAL at 09:27

## 2025-08-24 RX ADMIN — CITALOPRAM 40 MG: 20 TABLET, FILM COATED ORAL at 07:36

## 2025-08-24 RX ADMIN — NYSTATIN 500000 UNITS: 100000 SUSPENSION ORAL at 08:44

## 2025-08-24 RX ADMIN — SODIUM CHLORIDE, PRESERVATIVE FREE 10 ML: 5 INJECTION INTRAVENOUS at 07:44

## 2025-08-24 RX ADMIN — POTASSIUM CHLORIDE 20 MEQ: 1500 TABLET, EXTENDED RELEASE ORAL at 13:51

## 2025-08-24 RX ADMIN — BUMETANIDE 2 MG: 1 TABLET ORAL at 07:36

## 2025-08-24 ASSESSMENT — PAIN DESCRIPTION - LOCATION
LOCATION: BACK
LOCATION: BACK
LOCATION: CHEST
LOCATION: BACK
LOCATION: CHEST

## 2025-08-24 ASSESSMENT — PAIN DESCRIPTION - FREQUENCY
FREQUENCY: CONTINUOUS
FREQUENCY: INTERMITTENT
FREQUENCY: INTERMITTENT

## 2025-08-24 ASSESSMENT — PAIN DESCRIPTION - ONSET
ONSET: ON-GOING

## 2025-08-24 ASSESSMENT — PAIN DESCRIPTION - PAIN TYPE
TYPE: ACUTE PAIN
TYPE: CHRONIC PAIN
TYPE: CHRONIC PAIN
TYPE: ACUTE PAIN
TYPE: CHRONIC PAIN

## 2025-08-24 ASSESSMENT — PAIN DESCRIPTION - DESCRIPTORS
DESCRIPTORS: SHARP;ACHING;DULL
DESCRIPTORS: SHARP;ACHING;DULL
DESCRIPTORS: HEAVINESS
DESCRIPTORS: HEAVINESS
DESCRIPTORS: SHARP;ACHING;DULL

## 2025-08-24 ASSESSMENT — PAIN DESCRIPTION - ORIENTATION
ORIENTATION: MID
ORIENTATION: MID;LOWER
ORIENTATION: MID
ORIENTATION: MID;LOWER
ORIENTATION: MID;LOWER

## 2025-08-24 ASSESSMENT — PAIN SCALES - GENERAL
PAINLEVEL_OUTOF10: 5
PAINLEVEL_OUTOF10: 7
PAINLEVEL_OUTOF10: 0
PAINLEVEL_OUTOF10: 6
PAINLEVEL_OUTOF10: 0
PAINLEVEL_OUTOF10: 7
PAINLEVEL_OUTOF10: 5

## 2025-08-24 ASSESSMENT — PAIN - FUNCTIONAL ASSESSMENT
PAIN_FUNCTIONAL_ASSESSMENT: ACTIVITIES ARE NOT PREVENTED
PAIN_FUNCTIONAL_ASSESSMENT: ACTIVITIES ARE NOT PREVENTED
PAIN_FUNCTIONAL_ASSESSMENT: 0-10
PAIN_FUNCTIONAL_ASSESSMENT: ACTIVITIES ARE NOT PREVENTED

## 2025-08-25 ENCOUNTER — APPOINTMENT (OUTPATIENT)
Dept: NUCLEAR MEDICINE | Age: 80
DRG: 303 | End: 2025-08-25
Payer: MEDICARE

## 2025-08-25 ENCOUNTER — APPOINTMENT (OUTPATIENT)
Age: 80
DRG: 303 | End: 2025-08-25
Payer: MEDICARE

## 2025-08-25 VITALS
HEIGHT: 65 IN | HEART RATE: 67 BPM | BODY MASS INDEX: 22.92 KG/M2 | DIASTOLIC BLOOD PRESSURE: 85 MMHG | SYSTOLIC BLOOD PRESSURE: 137 MMHG | OXYGEN SATURATION: 99 % | WEIGHT: 137.6 LBS | RESPIRATION RATE: 20 BRPM | TEMPERATURE: 97.8 F

## 2025-08-25 PROBLEM — R94.31 ABNORMAL ECG: Status: ACTIVE | Noted: 2025-08-25

## 2025-08-25 PROBLEM — E78.5 DYSLIPIDEMIA: Status: ACTIVE | Noted: 2025-08-25

## 2025-08-25 PROBLEM — R07.89 ATYPICAL CHEST PAIN: Status: ACTIVE | Noted: 2025-08-25

## 2025-08-25 PROBLEM — I10 ESSENTIAL HYPERTENSION: Status: ACTIVE | Noted: 2025-08-25

## 2025-08-25 LAB
ALBUMIN SERPL-MCNC: 3.5 G/DL (ref 3.5–5.2)
ALBUMIN/GLOB SERPL: 1.7 {RATIO} (ref 1–2.5)
ALP SERPL-CCNC: 62 U/L (ref 35–104)
ALT SERPL-CCNC: 16 U/L (ref 10–35)
ANION GAP SERPL CALCULATED.3IONS-SCNC: 9 MMOL/L (ref 9–16)
AST SERPL-CCNC: 12 U/L (ref 10–35)
BASOPHILS # BLD: <0.03 K/UL (ref 0–0.2)
BASOPHILS NFR BLD: 0 % (ref 0–2)
BILIRUB SERPL-MCNC: 0.3 MG/DL (ref 0–1.2)
BUN SERPL-MCNC: 20 MG/DL (ref 8–23)
BUN/CREAT SERPL: 40 (ref 9–20)
CALCIUM SERPL-MCNC: 8.7 MG/DL (ref 8.6–10.4)
CHLORIDE SERPL-SCNC: 100 MMOL/L (ref 98–107)
CO2 SERPL-SCNC: 31 MMOL/L (ref 20–31)
CREAT SERPL-MCNC: 0.5 MG/DL (ref 0.5–0.9)
ECHO BSA: 1.73 M2
EOSINOPHIL # BLD: <0.03 K/UL (ref 0–0.44)
EOSINOPHILS RELATIVE PERCENT: 0 % (ref 1–4)
ERYTHROCYTE [DISTWIDTH] IN BLOOD BY AUTOMATED COUNT: 14 % (ref 11.8–14.4)
GFR, ESTIMATED: >90 ML/MIN/1.73M2
GLUCOSE SERPL-MCNC: 128 MG/DL (ref 74–99)
HCT VFR BLD AUTO: 39.4 % (ref 36.3–47.1)
HGB BLD-MCNC: 12.5 G/DL (ref 11.9–15.1)
IMM GRANULOCYTES # BLD AUTO: 0.07 K/UL (ref 0–0.3)
IMM GRANULOCYTES NFR BLD: 1 %
LYMPHOCYTES NFR BLD: 1.1 K/UL (ref 1.1–3.7)
LYMPHOCYTES RELATIVE PERCENT: 13 % (ref 24–43)
MCH RBC QN AUTO: 30.9 PG (ref 25.2–33.5)
MCHC RBC AUTO-ENTMCNC: 31.7 G/DL (ref 28.4–34.8)
MCV RBC AUTO: 97.5 FL (ref 82.6–102.9)
MONOCYTES NFR BLD: 0.42 K/UL (ref 0.1–1.2)
MONOCYTES NFR BLD: 5 % (ref 3–12)
NEUTROPHILS NFR BLD: 81 % (ref 36–65)
NEUTS SEG NFR BLD: 7.21 K/UL (ref 1.5–8.1)
NRBC BLD-RTO: 0 PER 100 WBC
NUC STRESS EJECTION FRACTION: 72 %
PLATELET # BLD AUTO: 270 K/UL (ref 138–453)
PMV BLD AUTO: 8.9 FL (ref 8.1–13.5)
POTASSIUM SERPL-SCNC: 4.3 MMOL/L (ref 3.7–5.3)
PROT SERPL-MCNC: 5.5 G/DL (ref 6.6–8.7)
RBC # BLD AUTO: 4.04 M/UL (ref 3.95–5.11)
SODIUM SERPL-SCNC: 140 MMOL/L (ref 136–145)
STRESS BASELINE DIAS BP: 72 MMHG
STRESS BASELINE HR: 52 BPM
STRESS BASELINE ST DEPRESSION: 0 MM
STRESS BASELINE SYS BP: 128 MMHG
STRESS ESTIMATED WORKLOAD: 1 METS
STRESS PEAK DIAS BP: 72 MMHG
STRESS PEAK SYS BP: 128 MMHG
STRESS PERCENT HR ACHIEVED: 57 %
STRESS POST PEAK HR: 81 BPM
STRESS RATE PRESSURE PRODUCT: NORMAL BPM*MMHG
STRESS TARGET HR: 141 BPM
TID: 0.93
WBC OTHER # BLD: 8.8 K/UL (ref 3.5–11.3)

## 2025-08-25 PROCEDURE — 85025 COMPLETE CBC W/AUTO DIFF WBC: CPT

## 2025-08-25 PROCEDURE — 93017 CV STRESS TEST TRACING ONLY: CPT

## 2025-08-25 PROCEDURE — 2700000000 HC OXYGEN THERAPY PER DAY

## 2025-08-25 PROCEDURE — 80053 COMPREHEN METABOLIC PANEL: CPT

## 2025-08-25 PROCEDURE — 94640 AIRWAY INHALATION TREATMENT: CPT

## 2025-08-25 PROCEDURE — 94669 MECHANICAL CHEST WALL OSCILL: CPT

## 2025-08-25 PROCEDURE — 94761 N-INVAS EAR/PLS OXIMETRY MLT: CPT

## 2025-08-25 PROCEDURE — 6370000000 HC RX 637 (ALT 250 FOR IP): Performed by: INTERNAL MEDICINE

## 2025-08-25 PROCEDURE — 36415 COLL VENOUS BLD VENIPUNCTURE: CPT

## 2025-08-25 PROCEDURE — 3430000000 HC RX DIAGNOSTIC RADIOPHARMACEUTICAL: Performed by: INTERNAL MEDICINE

## 2025-08-25 PROCEDURE — 2500000003 HC RX 250 WO HCPCS: Performed by: INTERNAL MEDICINE

## 2025-08-25 PROCEDURE — 78452 HT MUSCLE IMAGE SPECT MULT: CPT | Performed by: INTERNAL MEDICINE

## 2025-08-25 PROCEDURE — 6360000002 HC RX W HCPCS: Performed by: FAMILY MEDICINE

## 2025-08-25 PROCEDURE — 93016 CV STRESS TEST SUPVJ ONLY: CPT | Performed by: INTERNAL MEDICINE

## 2025-08-25 PROCEDURE — A9500 TC99M SESTAMIBI: HCPCS | Performed by: INTERNAL MEDICINE

## 2025-08-25 PROCEDURE — 93018 CV STRESS TEST I&R ONLY: CPT | Performed by: INTERNAL MEDICINE

## 2025-08-25 PROCEDURE — 99222 1ST HOSP IP/OBS MODERATE 55: CPT | Performed by: INTERNAL MEDICINE

## 2025-08-25 RX ORDER — TETRAKIS(2-METHOXYISOBUTYLISOCYANIDE)COPPER(I) TETRAFLUOROBORATE 1 MG/ML
10 INJECTION, POWDER, LYOPHILIZED, FOR SOLUTION INTRAVENOUS
Status: COMPLETED | OUTPATIENT
Start: 2025-08-25 | End: 2025-08-25

## 2025-08-25 RX ORDER — TETRAKIS(2-METHOXYISOBUTYLISOCYANIDE)COPPER(I) TETRAFLUOROBORATE 1 MG/ML
30 INJECTION, POWDER, LYOPHILIZED, FOR SOLUTION INTRAVENOUS
Status: COMPLETED | OUTPATIENT
Start: 2025-08-25 | End: 2025-08-25

## 2025-08-25 RX ORDER — REGADENOSON 0.08 MG/ML
0.4 INJECTION, SOLUTION INTRAVENOUS
Status: COMPLETED | OUTPATIENT
Start: 2025-08-25 | End: 2025-08-25

## 2025-08-25 RX ORDER — NITROGLYCERIN 0.4 MG/1
0.4 TABLET SUBLINGUAL PRN
Qty: 25 TABLET | Refills: 3 | OUTPATIENT
Start: 2025-08-25

## 2025-08-25 RX ADMIN — Medication 1 TABLET: at 13:01

## 2025-08-25 RX ADMIN — IPRATROPIUM BROMIDE AND ALBUTEROL SULFATE 1 DOSE: .5; 2.5 SOLUTION RESPIRATORY (INHALATION) at 15:35

## 2025-08-25 RX ADMIN — LEVOTHYROXINE SODIUM 200 MCG: 0.1 TABLET ORAL at 13:02

## 2025-08-25 RX ADMIN — Medication 30 MILLICURIE: at 11:40

## 2025-08-25 RX ADMIN — SODIUM CHLORIDE, PRESERVATIVE FREE 10 ML: 5 INJECTION INTRAVENOUS at 13:12

## 2025-08-25 RX ADMIN — BUMETANIDE 2 MG: 1 TABLET ORAL at 13:01

## 2025-08-25 RX ADMIN — NYSTATIN 500000 UNITS: 100000 SUSPENSION ORAL at 13:11

## 2025-08-25 RX ADMIN — OXYCODONE HYDROCHLORIDE AND ACETAMINOPHEN 500 MG: 500 TABLET ORAL at 13:01

## 2025-08-25 RX ADMIN — SPIRONOLACTONE 50 MG: 25 TABLET ORAL at 13:01

## 2025-08-25 RX ADMIN — IPRATROPIUM BROMIDE AND ALBUTEROL SULFATE 1 DOSE: .5; 2.5 SOLUTION RESPIRATORY (INHALATION) at 10:36

## 2025-08-25 RX ADMIN — HYDROCODONE BITARTRATE AND ACETAMINOPHEN 1 TABLET: 10; 325 TABLET ORAL at 05:54

## 2025-08-25 RX ADMIN — POTASSIUM CHLORIDE 20 MEQ: 1500 TABLET, EXTENDED RELEASE ORAL at 13:01

## 2025-08-25 RX ADMIN — DIGOXIN 125 MCG: 125 TABLET ORAL at 13:02

## 2025-08-25 RX ADMIN — PREGABALIN 300 MG: 75 CAPSULE ORAL at 13:02

## 2025-08-25 RX ADMIN — BUPROPION HYDROCHLORIDE 300 MG: 150 TABLET, EXTENDED RELEASE ORAL at 13:01

## 2025-08-25 RX ADMIN — PREDNISONE 10 MG: 10 TABLET ORAL at 13:01

## 2025-08-25 RX ADMIN — Medication 10 MILLICURIE: at 10:05

## 2025-08-25 RX ADMIN — DULOXETINE 60 MG: 60 CAPSULE, DELAYED RELEASE ORAL at 13:01

## 2025-08-25 RX ADMIN — RIVAROXABAN 20 MG: 20 TABLET, FILM COATED ORAL at 13:01

## 2025-08-25 RX ADMIN — DOXYCYCLINE HYCLATE 100 MG: 100 CAPSULE ORAL at 13:01

## 2025-08-25 RX ADMIN — DILTIAZEM HYDROCHLORIDE 120 MG: 120 CAPSULE, COATED, EXTENDED RELEASE ORAL at 13:01

## 2025-08-25 RX ADMIN — ASPIRIN 81 MG: 81 TABLET, COATED ORAL at 13:01

## 2025-08-25 RX ADMIN — REGADENOSON 0.4 MG: 0.08 INJECTION, SOLUTION INTRAVENOUS at 11:58

## 2025-08-25 RX ADMIN — POLYETHYLENE GLYCOL 3350 17 G: 17 POWDER, FOR SOLUTION ORAL at 13:00

## 2025-08-25 RX ADMIN — CITALOPRAM 40 MG: 20 TABLET, FILM COATED ORAL at 13:00

## 2025-08-25 RX ADMIN — IPRATROPIUM BROMIDE AND ALBUTEROL SULFATE 1 DOSE: .5; 2.5 SOLUTION RESPIRATORY (INHALATION) at 05:56

## 2025-08-25 ASSESSMENT — PAIN DESCRIPTION - LOCATION: LOCATION: HEAD;BACK

## 2025-08-25 ASSESSMENT — PAIN SCALES - GENERAL
PAINLEVEL_OUTOF10: 0
PAINLEVEL_OUTOF10: 7
PAINLEVEL_OUTOF10: 0

## 2025-08-25 ASSESSMENT — PAIN - FUNCTIONAL ASSESSMENT: PAIN_FUNCTIONAL_ASSESSMENT: 0-10

## 2025-08-25 ASSESSMENT — PAIN DESCRIPTION - DESCRIPTORS: DESCRIPTORS: ACHING

## 2025-08-26 ENCOUNTER — CARE COORDINATION (OUTPATIENT)
Dept: CARE COORDINATION | Age: 80
End: 2025-08-26

## 2025-08-26 DIAGNOSIS — R07.89 ATYPICAL CHEST PAIN: Primary | ICD-10-CM

## 2025-08-26 PROCEDURE — 1111F DSCHRG MED/CURRENT MED MERGE: CPT | Performed by: INTERNAL MEDICINE

## 2025-08-27 ENCOUNTER — HOSPITAL ENCOUNTER (EMERGENCY)
Age: 80
Discharge: ANOTHER HOSPITAL | End: 2025-08-28
Attending: STUDENT IN AN ORGANIZED HEALTH CARE EDUCATION/TRAINING PROGRAM
Payer: MEDICARE

## 2025-08-27 ENCOUNTER — APPOINTMENT (OUTPATIENT)
Dept: CT IMAGING | Age: 80
End: 2025-08-27
Payer: MEDICARE

## 2025-08-27 ENCOUNTER — APPOINTMENT (OUTPATIENT)
Dept: GENERAL RADIOLOGY | Age: 80
End: 2025-08-27
Payer: MEDICARE

## 2025-08-27 DIAGNOSIS — K31.89 GASTRIC VOLVULUS: ICD-10-CM

## 2025-08-27 DIAGNOSIS — K56.2 SIGMOID VOLVULUS (HCC): Primary | ICD-10-CM

## 2025-08-27 DIAGNOSIS — K56.609 LARGE BOWEL OBSTRUCTION (HCC): ICD-10-CM

## 2025-08-27 LAB
ALBUMIN SERPL-MCNC: 4 G/DL (ref 3.5–5.2)
ALBUMIN/GLOB SERPL: 1.6 {RATIO} (ref 1–2.5)
ALP SERPL-CCNC: 72 U/L (ref 35–104)
ALT SERPL-CCNC: 22 U/L (ref 10–35)
ANION GAP SERPL CALCULATED.3IONS-SCNC: 13 MMOL/L (ref 9–16)
AST SERPL-CCNC: 22 U/L (ref 10–35)
BASOPHILS # BLD: <0.03 K/UL (ref 0–0.2)
BASOPHILS NFR BLD: 0 % (ref 0–2)
BILIRUB SERPL-MCNC: 0.9 MG/DL (ref 0–1.2)
BILIRUB UR QL STRIP: NEGATIVE
BUN SERPL-MCNC: 27 MG/DL (ref 8–23)
BUN/CREAT SERPL: 34 (ref 9–20)
CALCIUM SERPL-MCNC: 8.8 MG/DL (ref 8.6–10.4)
CHLORIDE SERPL-SCNC: 91 MMOL/L (ref 98–107)
CLARITY UR: CLEAR
CO2 SERPL-SCNC: 31 MMOL/L (ref 20–31)
COLOR UR: YELLOW
CREAT SERPL-MCNC: 0.8 MG/DL (ref 0.5–0.9)
EOSINOPHIL # BLD: <0.03 K/UL (ref 0–0.44)
EOSINOPHILS RELATIVE PERCENT: 0 % (ref 1–4)
EPI CELLS #/AREA URNS HPF: NORMAL /HPF (ref 0–25)
ERYTHROCYTE [DISTWIDTH] IN BLOOD BY AUTOMATED COUNT: 13.5 % (ref 11.8–14.4)
GFR, ESTIMATED: 70 ML/MIN/1.73M2
GLUCOSE SERPL-MCNC: 129 MG/DL (ref 74–99)
GLUCOSE UR STRIP-MCNC: NEGATIVE MG/DL
HCT VFR BLD AUTO: 37.8 % (ref 36.3–47.1)
HGB BLD-MCNC: 12.6 G/DL (ref 11.9–15.1)
HGB UR QL STRIP.AUTO: ABNORMAL
IMM GRANULOCYTES # BLD AUTO: 0.04 K/UL (ref 0–0.3)
IMM GRANULOCYTES NFR BLD: 0 %
KETONES UR STRIP-MCNC: NEGATIVE MG/DL
LACTATE BLDV-SCNC: 1.3 MMOL/L (ref 0.5–2.2)
LEUKOCYTE ESTERASE UR QL STRIP: NEGATIVE
LIPASE SERPL-CCNC: 30 U/L (ref 13–60)
LYMPHOCYTES NFR BLD: 1.3 K/UL (ref 1.1–3.7)
LYMPHOCYTES RELATIVE PERCENT: 12 % (ref 24–43)
MCH RBC QN AUTO: 31.1 PG (ref 25.2–33.5)
MCHC RBC AUTO-ENTMCNC: 33.3 G/DL (ref 28.4–34.8)
MCV RBC AUTO: 93.3 FL (ref 82.6–102.9)
MONOCYTES NFR BLD: 0.76 K/UL (ref 0.1–1.2)
MONOCYTES NFR BLD: 7 % (ref 3–12)
NEUTROPHILS NFR BLD: 81 % (ref 36–65)
NEUTS SEG NFR BLD: 8.84 K/UL (ref 1.5–8.1)
NITRITE UR QL STRIP: NEGATIVE
NRBC BLD-RTO: 0 PER 100 WBC
PH UR STRIP: 6 [PH] (ref 5–9)
PLATELET # BLD AUTO: 310 K/UL (ref 138–453)
PMV BLD AUTO: 8.9 FL (ref 8.1–13.5)
POTASSIUM SERPL-SCNC: 3.8 MMOL/L (ref 3.7–5.3)
PROT SERPL-MCNC: 6.4 G/DL (ref 6.6–8.7)
PROT UR STRIP-MCNC: NEGATIVE MG/DL
RBC # BLD AUTO: 4.05 M/UL (ref 3.95–5.11)
RBC #/AREA URNS HPF: NORMAL /HPF (ref 0–2)
SODIUM SERPL-SCNC: 135 MMOL/L (ref 136–145)
SP GR UR STRIP: 1.01 (ref 1.01–1.02)
TROPONIN I SERPL HS-MCNC: 14 NG/L (ref 0–14)
UROBILINOGEN UR STRIP-ACNC: NORMAL EU/DL (ref 0–1)
WBC #/AREA URNS HPF: NORMAL /HPF (ref 0–5)
WBC OTHER # BLD: 11 K/UL (ref 3.5–11.3)

## 2025-08-27 PROCEDURE — 99285 EMERGENCY DEPT VISIT HI MDM: CPT

## 2025-08-27 PROCEDURE — 84484 ASSAY OF TROPONIN QUANT: CPT

## 2025-08-27 PROCEDURE — 81001 URINALYSIS AUTO W/SCOPE: CPT

## 2025-08-27 PROCEDURE — 93005 ELECTROCARDIOGRAM TRACING: CPT | Performed by: STUDENT IN AN ORGANIZED HEALTH CARE EDUCATION/TRAINING PROGRAM

## 2025-08-27 PROCEDURE — 74177 CT ABD & PELVIS W/CONTRAST: CPT

## 2025-08-27 PROCEDURE — 96361 HYDRATE IV INFUSION ADD-ON: CPT

## 2025-08-27 PROCEDURE — 83605 ASSAY OF LACTIC ACID: CPT

## 2025-08-27 PROCEDURE — 2580000003 HC RX 258: Performed by: STUDENT IN AN ORGANIZED HEALTH CARE EDUCATION/TRAINING PROGRAM

## 2025-08-27 PROCEDURE — 83690 ASSAY OF LIPASE: CPT

## 2025-08-27 PROCEDURE — 85025 COMPLETE CBC W/AUTO DIFF WBC: CPT

## 2025-08-27 PROCEDURE — 80053 COMPREHEN METABOLIC PANEL: CPT

## 2025-08-27 PROCEDURE — 6360000004 HC RX CONTRAST MEDICATION: Performed by: STUDENT IN AN ORGANIZED HEALTH CARE EDUCATION/TRAINING PROGRAM

## 2025-08-27 PROCEDURE — 96374 THER/PROPH/DIAG INJ IV PUSH: CPT

## 2025-08-27 PROCEDURE — 6360000002 HC RX W HCPCS: Performed by: STUDENT IN AN ORGANIZED HEALTH CARE EDUCATION/TRAINING PROGRAM

## 2025-08-27 PROCEDURE — 71045 X-RAY EXAM CHEST 1 VIEW: CPT

## 2025-08-27 PROCEDURE — 96375 TX/PRO/DX INJ NEW DRUG ADDON: CPT

## 2025-08-27 RX ORDER — IOPAMIDOL 755 MG/ML
75 INJECTION, SOLUTION INTRAVASCULAR
Status: COMPLETED | OUTPATIENT
Start: 2025-08-27 | End: 2025-08-27

## 2025-08-27 RX ORDER — 0.9 % SODIUM CHLORIDE 0.9 %
1000 INTRAVENOUS SOLUTION INTRAVENOUS ONCE
Status: COMPLETED | OUTPATIENT
Start: 2025-08-27 | End: 2025-08-28

## 2025-08-27 RX ORDER — HYDROMORPHONE HYDROCHLORIDE 1 MG/ML
1 INJECTION, SOLUTION INTRAMUSCULAR; INTRAVENOUS; SUBCUTANEOUS ONCE
Status: COMPLETED | OUTPATIENT
Start: 2025-08-27 | End: 2025-08-27

## 2025-08-27 RX ORDER — ONDANSETRON 2 MG/ML
4 INJECTION INTRAMUSCULAR; INTRAVENOUS ONCE
Status: COMPLETED | OUTPATIENT
Start: 2025-08-27 | End: 2025-08-27

## 2025-08-27 RX ADMIN — SODIUM CHLORIDE 1000 ML: 0.9 INJECTION, SOLUTION INTRAVENOUS at 21:29

## 2025-08-27 RX ADMIN — ONDANSETRON 4 MG: 2 INJECTION, SOLUTION INTRAMUSCULAR; INTRAVENOUS at 21:27

## 2025-08-27 RX ADMIN — IOPAMIDOL 75 ML: 755 INJECTION, SOLUTION INTRAVENOUS at 22:11

## 2025-08-27 RX ADMIN — HYDROMORPHONE HYDROCHLORIDE 1 MG: 1 INJECTION, SOLUTION INTRAMUSCULAR; INTRAVENOUS; SUBCUTANEOUS at 21:28

## 2025-08-27 ASSESSMENT — PAIN SCALES - GENERAL
PAINLEVEL_OUTOF10: 8
PAINLEVEL_OUTOF10: 10

## 2025-08-27 ASSESSMENT — ENCOUNTER SYMPTOMS
ABDOMINAL PAIN: 1
ABDOMINAL DISTENTION: 1
DIARRHEA: 0
VOMITING: 0
ANAL BLEEDING: 1
CONSTIPATION: 1
NAUSEA: 1
SHORTNESS OF BREATH: 0

## 2025-08-27 ASSESSMENT — PAIN DESCRIPTION - LOCATION
LOCATION: ABDOMEN
LOCATION: ABDOMEN

## 2025-08-27 ASSESSMENT — PAIN DESCRIPTION - ORIENTATION
ORIENTATION: RIGHT;LEFT;LOWER
ORIENTATION: LOWER

## 2025-08-27 ASSESSMENT — PAIN DESCRIPTION - DESCRIPTORS: DESCRIPTORS: ACHING

## 2025-08-28 ENCOUNTER — APPOINTMENT (OUTPATIENT)
Dept: GENERAL RADIOLOGY | Age: 80
DRG: 327 | End: 2025-08-28
Payer: MEDICARE

## 2025-08-28 ENCOUNTER — ANESTHESIA EVENT (OUTPATIENT)
Dept: OPERATING ROOM | Age: 80
End: 2025-08-28
Payer: MEDICARE

## 2025-08-28 ENCOUNTER — HOSPITAL ENCOUNTER (INPATIENT)
Age: 80
LOS: 5 days | Discharge: HOME HEALTH | DRG: 327 | End: 2025-09-02
Attending: EMERGENCY MEDICINE | Admitting: SURGERY
Payer: MEDICARE

## 2025-08-28 ENCOUNTER — ANESTHESIA (OUTPATIENT)
Dept: OPERATING ROOM | Age: 80
End: 2025-08-28
Payer: MEDICARE

## 2025-08-28 VITALS
DIASTOLIC BLOOD PRESSURE: 77 MMHG | RESPIRATION RATE: 20 BRPM | TEMPERATURE: 98.4 F | OXYGEN SATURATION: 100 % | SYSTOLIC BLOOD PRESSURE: 113 MMHG | HEART RATE: 79 BPM

## 2025-08-28 DIAGNOSIS — K56.2 SIGMOID VOLVULUS (HCC): ICD-10-CM

## 2025-08-28 DIAGNOSIS — K56.609 LARGE BOWEL OBSTRUCTION (HCC): Primary | ICD-10-CM

## 2025-08-28 LAB
ABO + RH BLD: NORMAL
ALBUMIN SERPL-MCNC: 3.8 G/DL (ref 3.5–5.2)
ALBUMIN/GLOB SERPL: 1.6 {RATIO} (ref 1–2.5)
ALP SERPL-CCNC: 67 U/L (ref 35–104)
ALT SERPL-CCNC: 19 U/L (ref 10–35)
ANION GAP SERPL CALCULATED.3IONS-SCNC: 12 MMOL/L (ref 9–16)
ANION GAP SERPL CALCULATED.3IONS-SCNC: 14 MMOL/L (ref 9–16)
ANION GAP SERPL CALCULATED.3IONS-SCNC: 7 MMOL/L (ref 9–16)
ARM BAND NUMBER: NORMAL
AST SERPL-CCNC: 22 U/L (ref 10–35)
BASOPHILS # BLD: 0 K/UL (ref 0–0.2)
BASOPHILS # BLD: <0.03 K/UL (ref 0–0.2)
BASOPHILS NFR BLD: 0 % (ref 0–2)
BASOPHILS NFR BLD: 0 % (ref 0–2)
BILIRUB DIRECT SERPL-MCNC: 0.3 MG/DL (ref 0–0.2)
BILIRUB INDIRECT SERPL-MCNC: 0.5 MG/DL (ref 0–1)
BILIRUB SERPL-MCNC: 0.8 MG/DL (ref 0–1.2)
BLOOD BANK SAMPLE EXPIRATION: NORMAL
BLOOD GROUP ANTIBODIES SERPL: NEGATIVE
BUN SERPL-MCNC: 17 MG/DL (ref 8–23)
BUN SERPL-MCNC: 20 MG/DL (ref 8–23)
BUN SERPL-MCNC: 22 MG/DL (ref 8–23)
CALCIUM SERPL-MCNC: 8.6 MG/DL (ref 8.6–10.4)
CALCIUM SERPL-MCNC: 8.6 MG/DL (ref 8.6–10.4)
CALCIUM SERPL-MCNC: 9.1 MG/DL (ref 8.6–10.4)
CHLORIDE SERPL-SCNC: 95 MMOL/L (ref 98–107)
CHLORIDE SERPL-SCNC: 96 MMOL/L (ref 98–107)
CHLORIDE SERPL-SCNC: 98 MMOL/L (ref 98–107)
CO2 SERPL-SCNC: 26 MMOL/L (ref 20–31)
CO2 SERPL-SCNC: 26 MMOL/L (ref 20–31)
CO2 SERPL-SCNC: 35 MMOL/L (ref 20–31)
CREAT SERPL-MCNC: 0.5 MG/DL (ref 0.6–0.9)
CREAT SERPL-MCNC: 0.5 MG/DL (ref 0.6–0.9)
CREAT SERPL-MCNC: 0.7 MG/DL (ref 0.6–0.9)
CRP SERPL HS-MCNC: 3.1 MG/L (ref 0–5)
EKG ATRIAL RATE: 88 BPM
EKG P AXIS: 28 DEGREES
EKG P-R INTERVAL: 172 MS
EKG Q-T INTERVAL: 374 MS
EKG QRS DURATION: 80 MS
EKG QTC CALCULATION (BAZETT): 452 MS
EKG R AXIS: -20 DEGREES
EKG T AXIS: 49 DEGREES
EKG VENTRICULAR RATE: 88 BPM
EOSINOPHIL # BLD: 0 K/UL (ref 0–0.44)
EOSINOPHIL # BLD: 0.11 K/UL (ref 0–0.44)
EOSINOPHILS RELATIVE PERCENT: 0 % (ref 1–4)
EOSINOPHILS RELATIVE PERCENT: 1 % (ref 1–4)
ERYTHROCYTE [DISTWIDTH] IN BLOOD BY AUTOMATED COUNT: 13.8 % (ref 11.8–14.4)
ERYTHROCYTE [DISTWIDTH] IN BLOOD BY AUTOMATED COUNT: 13.8 % (ref 11.8–14.4)
FIBRINOGEN, FUNCTIONAL TEG: 18.7 MM (ref 15–32)
GFR, ESTIMATED: 88 ML/MIN/1.73M2
GFR, ESTIMATED: >90 ML/MIN/1.73M2
GFR, ESTIMATED: >90 ML/MIN/1.73M2
GLOBULIN SER CALC-MCNC: 2.4 G/DL
GLUCOSE SERPL-MCNC: 112 MG/DL (ref 74–99)
GLUCOSE SERPL-MCNC: 114 MG/DL (ref 74–99)
GLUCOSE SERPL-MCNC: 80 MG/DL (ref 74–99)
HCT VFR BLD AUTO: 37 % (ref 36.3–47.1)
HCT VFR BLD AUTO: 42.3 % (ref 36.3–47.1)
HGB BLD-MCNC: 12.1 G/DL (ref 11.9–15.1)
HGB BLD-MCNC: 14 G/DL (ref 11.9–15.1)
IMM GRANULOCYTES # BLD AUTO: 0.04 K/UL (ref 0–0.3)
IMM GRANULOCYTES # BLD AUTO: 0.27 K/UL (ref 0–0.3)
IMM GRANULOCYTES NFR BLD: 0 %
IMM GRANULOCYTES NFR BLD: 1 %
INR PPP: 1.2
LACTIC ACID, WHOLE BLOOD: 0.9 MMOL/L (ref 0.7–2.1)
LIPASE SERPL-CCNC: 21 U/L (ref 13–60)
LY30 (LYSIS) TEG: 0 % (ref 0–2.6)
LYMPHOCYTES NFR BLD: 0.27 K/UL (ref 1.1–3.7)
LYMPHOCYTES NFR BLD: 2.4 K/UL (ref 1.1–3.7)
LYMPHOCYTES RELATIVE PERCENT: 1 % (ref 24–43)
LYMPHOCYTES RELATIVE PERCENT: 24 % (ref 24–43)
MA(MAX CLOT) RAPID TEG: 63.1 MM (ref 52–70)
MCH RBC QN AUTO: 31.1 PG (ref 25.2–33.5)
MCH RBC QN AUTO: 31.6 PG (ref 25.2–33.5)
MCHC RBC AUTO-ENTMCNC: 32.7 G/DL (ref 28.4–34.8)
MCHC RBC AUTO-ENTMCNC: 33.1 G/DL (ref 28.4–34.8)
MCV RBC AUTO: 95.1 FL (ref 82.6–102.9)
MCV RBC AUTO: 95.5 FL (ref 82.6–102.9)
MONOCYTES NFR BLD: 0.9 K/UL (ref 0.1–1.2)
MONOCYTES NFR BLD: 1.35 K/UL (ref 0.1–1.2)
MONOCYTES NFR BLD: 5 % (ref 3–12)
MONOCYTES NFR BLD: 9 % (ref 3–12)
MORPHOLOGY: NORMAL
NEUTROPHILS NFR BLD: 66 % (ref 36–65)
NEUTROPHILS NFR BLD: 93 % (ref 36–65)
NEUTS SEG NFR BLD: 25.01 K/UL (ref 1.5–8.1)
NEUTS SEG NFR BLD: 6.43 K/UL (ref 1.5–8.1)
NRBC BLD-RTO: 0 PER 100 WBC
NRBC BLD-RTO: 0 PER 100 WBC
PLATELET # BLD AUTO: 269 K/UL (ref 138–453)
PLATELET # BLD AUTO: 271 K/UL (ref 138–453)
PMV BLD AUTO: 8.8 FL (ref 8.1–13.5)
PMV BLD AUTO: 9.4 FL (ref 8.1–13.5)
POTASSIUM SERPL-SCNC: 3.2 MMOL/L (ref 3.7–5.3)
POTASSIUM SERPL-SCNC: 3.7 MMOL/L (ref 3.7–5.3)
POTASSIUM SERPL-SCNC: 3.8 MMOL/L (ref 3.7–5.3)
PROCALCITONIN SERPL-MCNC: 0.04 NG/ML (ref 0–0.09)
PROT SERPL-MCNC: 6.2 G/DL (ref 6.6–8.7)
PROTHROMBIN TIME: 15.5 SEC (ref 11.7–14.9)
RBC # BLD AUTO: 3.89 M/UL (ref 3.95–5.11)
RBC # BLD AUTO: 4.43 M/UL (ref 3.95–5.11)
REACTION TIME TEG: 3.6 MIN (ref 4.6–9.1)
SODIUM SERPL-SCNC: 136 MMOL/L (ref 136–145)
SODIUM SERPL-SCNC: 136 MMOL/L (ref 136–145)
SODIUM SERPL-SCNC: 137 MMOL/L (ref 136–145)
WBC OTHER # BLD: 26.9 K/UL (ref 3.5–11.3)
WBC OTHER # BLD: 9.9 K/UL (ref 3.5–11.3)

## 2025-08-28 PROCEDURE — 88307 TISSUE EXAM BY PATHOLOGIST: CPT

## 2025-08-28 PROCEDURE — 6360000002 HC RX W HCPCS: Performed by: NURSE ANESTHETIST, CERTIFIED REGISTERED

## 2025-08-28 PROCEDURE — 96374 THER/PROPH/DIAG INJ IV PUSH: CPT

## 2025-08-28 PROCEDURE — 99222 1ST HOSP IP/OBS MODERATE 55: CPT | Performed by: SURGERY

## 2025-08-28 PROCEDURE — 85025 COMPLETE CBC W/AUTO DIFF WBC: CPT

## 2025-08-28 PROCEDURE — 2700000000 HC OXYGEN THERAPY PER DAY

## 2025-08-28 PROCEDURE — 80048 BASIC METABOLIC PNL TOTAL CA: CPT

## 2025-08-28 PROCEDURE — 80076 HEPATIC FUNCTION PANEL: CPT

## 2025-08-28 PROCEDURE — 94640 AIRWAY INHALATION TREATMENT: CPT

## 2025-08-28 PROCEDURE — 7100000000 HC PACU RECOVERY - FIRST 15 MIN: Performed by: SURGERY

## 2025-08-28 PROCEDURE — 93010 ELECTROCARDIOGRAM REPORT: CPT | Performed by: FAMILY MEDICINE

## 2025-08-28 PROCEDURE — 86900 BLOOD TYPING SEROLOGIC ABO: CPT

## 2025-08-28 PROCEDURE — 6370000000 HC RX 637 (ALT 250 FOR IP)

## 2025-08-28 PROCEDURE — 83690 ASSAY OF LIPASE: CPT

## 2025-08-28 PROCEDURE — 3600000004 HC SURGERY LEVEL 4 BASE: Performed by: SURGERY

## 2025-08-28 PROCEDURE — 96375 TX/PRO/DX INJ NEW DRUG ADDON: CPT

## 2025-08-28 PROCEDURE — 96376 TX/PRO/DX INJ SAME DRUG ADON: CPT

## 2025-08-28 PROCEDURE — 86140 C-REACTIVE PROTEIN: CPT

## 2025-08-28 PROCEDURE — 2500000003 HC RX 250 WO HCPCS: Performed by: NURSE ANESTHETIST, CERTIFIED REGISTERED

## 2025-08-28 PROCEDURE — 85384 FIBRINOGEN ACTIVITY: CPT

## 2025-08-28 PROCEDURE — 3700000001 HC ADD 15 MINUTES (ANESTHESIA): Performed by: SURGERY

## 2025-08-28 PROCEDURE — 6360000002 HC RX W HCPCS

## 2025-08-28 PROCEDURE — 44143 PARTIAL REMOVAL OF COLON: CPT | Performed by: SURGERY

## 2025-08-28 PROCEDURE — 74018 RADEX ABDOMEN 1 VIEW: CPT

## 2025-08-28 PROCEDURE — 2500000003 HC RX 250 WO HCPCS

## 2025-08-28 PROCEDURE — 85347 COAGULATION TIME ACTIVATED: CPT

## 2025-08-28 PROCEDURE — 94761 N-INVAS EAR/PLS OXIMETRY MLT: CPT

## 2025-08-28 PROCEDURE — 85610 PROTHROMBIN TIME: CPT

## 2025-08-28 PROCEDURE — 7100000001 HC PACU RECOVERY - ADDTL 15 MIN: Performed by: SURGERY

## 2025-08-28 PROCEDURE — 6360000002 HC RX W HCPCS: Performed by: STUDENT IN AN ORGANIZED HEALTH CARE EDUCATION/TRAINING PROGRAM

## 2025-08-28 PROCEDURE — 85390 FIBRINOLYSINS SCREEN I&R: CPT

## 2025-08-28 PROCEDURE — 2500000003 HC RX 250 WO HCPCS: Performed by: ANESTHESIOLOGY

## 2025-08-28 PROCEDURE — 2580000003 HC RX 258

## 2025-08-28 PROCEDURE — 0DH60UZ INSERTION OF FEEDING DEVICE INTO STOMACH, OPEN APPROACH: ICD-10-PCS | Performed by: SURGERY

## 2025-08-28 PROCEDURE — 86850 RBC ANTIBODY SCREEN: CPT

## 2025-08-28 PROCEDURE — 2580000003 HC RX 258: Performed by: NURSE ANESTHETIST, CERTIFIED REGISTERED

## 2025-08-28 PROCEDURE — 0DBN0ZZ EXCISION OF SIGMOID COLON, OPEN APPROACH: ICD-10-PCS | Performed by: SURGERY

## 2025-08-28 PROCEDURE — 2500000003 HC RX 250 WO HCPCS: Performed by: SURGERY

## 2025-08-28 PROCEDURE — 99285 EMERGENCY DEPT VISIT HI MDM: CPT

## 2025-08-28 PROCEDURE — 2709999900 HC NON-CHARGEABLE SUPPLY: Performed by: SURGERY

## 2025-08-28 PROCEDURE — 2060000000 HC ICU INTERMEDIATE R&B

## 2025-08-28 PROCEDURE — 76942 ECHO GUIDE FOR BIOPSY: CPT | Performed by: ANESTHESIOLOGY

## 2025-08-28 PROCEDURE — 85576 BLOOD PLATELET AGGREGATION: CPT

## 2025-08-28 PROCEDURE — 36415 COLL VENOUS BLD VENIPUNCTURE: CPT

## 2025-08-28 PROCEDURE — 86901 BLOOD TYPING SEROLOGIC RH(D): CPT

## 2025-08-28 PROCEDURE — 84145 PROCALCITONIN (PCT): CPT

## 2025-08-28 PROCEDURE — 3600000014 HC SURGERY LEVEL 4 ADDTL 15MIN: Performed by: SURGERY

## 2025-08-28 PROCEDURE — 83605 ASSAY OF LACTIC ACID: CPT

## 2025-08-28 PROCEDURE — 43830 GSTRST OPEN WO CONSTJ TUBE: CPT | Performed by: SURGERY

## 2025-08-28 PROCEDURE — 0D1M0Z4 BYPASS DESCENDING COLON TO CUTANEOUS, OPEN APPROACH: ICD-10-PCS | Performed by: SURGERY

## 2025-08-28 PROCEDURE — 2720000010 HC SURG SUPPLY STERILE: Performed by: SURGERY

## 2025-08-28 PROCEDURE — 0DS60ZZ REPOSITION STOMACH, OPEN APPROACH: ICD-10-PCS | Performed by: SURGERY

## 2025-08-28 PROCEDURE — 3700000000 HC ANESTHESIA ATTENDED CARE: Performed by: SURGERY

## 2025-08-28 RX ORDER — GABAPENTIN 100 MG/1
100 CAPSULE ORAL 3 TIMES DAILY
Status: DISCONTINUED | OUTPATIENT
Start: 2025-08-28 | End: 2025-08-28

## 2025-08-28 RX ORDER — MAGNESIUM HYDROXIDE 1200 MG/15ML
LIQUID ORAL CONTINUOUS PRN
Status: DISCONTINUED | OUTPATIENT
Start: 2025-08-28 | End: 2025-08-28 | Stop reason: HOSPADM

## 2025-08-28 RX ORDER — SUCCINYLCHOLINE CHLORIDE 20 MG/ML
INJECTION INTRAMUSCULAR; INTRAVENOUS
Status: DISCONTINUED | OUTPATIENT
Start: 2025-08-28 | End: 2025-08-28 | Stop reason: SDUPTHER

## 2025-08-28 RX ORDER — SODIUM CHLORIDE 9 MG/ML
INJECTION, SOLUTION INTRAVENOUS
Status: DISCONTINUED | OUTPATIENT
Start: 2025-08-28 | End: 2025-08-28 | Stop reason: SDUPTHER

## 2025-08-28 RX ORDER — SODIUM CHLORIDE 9 MG/ML
INJECTION, SOLUTION INTRAVENOUS PRN
Status: DISCONTINUED | OUTPATIENT
Start: 2025-08-28 | End: 2025-09-02

## 2025-08-28 RX ORDER — LABETALOL HYDROCHLORIDE 5 MG/ML
INJECTION, SOLUTION INTRAVENOUS
Status: DISCONTINUED | OUTPATIENT
Start: 2025-08-28 | End: 2025-08-28 | Stop reason: SDUPTHER

## 2025-08-28 RX ORDER — SODIUM CHLORIDE 0.9 % (FLUSH) 0.9 %
5-40 SYRINGE (ML) INJECTION EVERY 12 HOURS SCHEDULED
Status: DISCONTINUED | OUTPATIENT
Start: 2025-08-28 | End: 2025-09-02

## 2025-08-28 RX ORDER — SODIUM CHLORIDE 0.9 % (FLUSH) 0.9 %
5-40 SYRINGE (ML) INJECTION PRN
Status: DISCONTINUED | OUTPATIENT
Start: 2025-08-28 | End: 2025-09-02

## 2025-08-28 RX ORDER — SODIUM CHLORIDE, SODIUM LACTATE, POTASSIUM CHLORIDE, CALCIUM CHLORIDE 600; 310; 30; 20 MG/100ML; MG/100ML; MG/100ML; MG/100ML
INJECTION, SOLUTION INTRAVENOUS CONTINUOUS
Status: DISCONTINUED | OUTPATIENT
Start: 2025-08-28 | End: 2025-08-29

## 2025-08-28 RX ORDER — METHOCARBAMOL 500 MG/1
500 TABLET, FILM COATED ORAL 4 TIMES DAILY
Status: DISCONTINUED | OUTPATIENT
Start: 2025-08-28 | End: 2025-08-28

## 2025-08-28 RX ORDER — IPRATROPIUM BROMIDE AND ALBUTEROL SULFATE 2.5; .5 MG/3ML; MG/3ML
1 SOLUTION RESPIRATORY (INHALATION)
Status: COMPLETED | OUTPATIENT
Start: 2025-08-28 | End: 2025-08-28

## 2025-08-28 RX ORDER — DULOXETIN HYDROCHLORIDE 30 MG/1
60 CAPSULE, DELAYED RELEASE ORAL DAILY
Status: DISCONTINUED | OUTPATIENT
Start: 2025-08-28 | End: 2025-09-02 | Stop reason: HOSPADM

## 2025-08-28 RX ORDER — SODIUM CHLORIDE 0.9 % (FLUSH) 0.9 %
5-40 SYRINGE (ML) INJECTION PRN
Status: DISCONTINUED | OUTPATIENT
Start: 2025-08-28 | End: 2025-09-02 | Stop reason: HOSPADM

## 2025-08-28 RX ORDER — ACETAMINOPHEN 500 MG
1000 TABLET ORAL EVERY 8 HOURS SCHEDULED
Status: DISCONTINUED | OUTPATIENT
Start: 2025-08-28 | End: 2025-09-02 | Stop reason: HOSPADM

## 2025-08-28 RX ORDER — IPRATROPIUM BROMIDE AND ALBUTEROL SULFATE 2.5; .5 MG/3ML; MG/3ML
1 SOLUTION RESPIRATORY (INHALATION)
Status: DISCONTINUED | OUTPATIENT
Start: 2025-08-28 | End: 2025-09-01

## 2025-08-28 RX ORDER — ONDANSETRON 2 MG/ML
INJECTION INTRAMUSCULAR; INTRAVENOUS
Status: DISCONTINUED | OUTPATIENT
Start: 2025-08-28 | End: 2025-08-28 | Stop reason: SDUPTHER

## 2025-08-28 RX ORDER — METRONIDAZOLE 500 MG/100ML
INJECTION, SOLUTION INTRAVENOUS
Status: DISCONTINUED | OUTPATIENT
Start: 2025-08-28 | End: 2025-08-28 | Stop reason: SDUPTHER

## 2025-08-28 RX ORDER — SPIRONOLACTONE 25 MG/1
50 TABLET ORAL DAILY
Status: DISCONTINUED | OUTPATIENT
Start: 2025-08-29 | End: 2025-09-02 | Stop reason: HOSPADM

## 2025-08-28 RX ORDER — LIDOCAINE HYDROCHLORIDE 10 MG/ML
INJECTION, SOLUTION EPIDURAL; INFILTRATION; INTRACAUDAL; PERINEURAL
Status: DISCONTINUED | OUTPATIENT
Start: 2025-08-28 | End: 2025-08-28 | Stop reason: SDUPTHER

## 2025-08-28 RX ORDER — BUPIVACAINE HYDROCHLORIDE AND EPINEPHRINE 5; 5 MG/ML; UG/ML
INJECTION, SOLUTION EPIDURAL; INTRACAUDAL; PERINEURAL
Status: DISCONTINUED | OUTPATIENT
Start: 2025-08-28 | End: 2025-08-28 | Stop reason: SDUPTHER

## 2025-08-28 RX ORDER — ATORVASTATIN CALCIUM 40 MG/1
40 TABLET, FILM COATED ORAL NIGHTLY
Status: DISCONTINUED | OUTPATIENT
Start: 2025-08-28 | End: 2025-09-02 | Stop reason: HOSPADM

## 2025-08-28 RX ORDER — PROCHLORPERAZINE EDISYLATE 5 MG/ML
10 INJECTION INTRAMUSCULAR; INTRAVENOUS ONCE
Status: COMPLETED | OUTPATIENT
Start: 2025-08-28 | End: 2025-08-28

## 2025-08-28 RX ORDER — DEXMEDETOMIDINE HYDROCHLORIDE 100 UG/ML
INJECTION, SOLUTION INTRAVENOUS
Status: DISCONTINUED | OUTPATIENT
Start: 2025-08-28 | End: 2025-08-28 | Stop reason: SDUPTHER

## 2025-08-28 RX ORDER — BUPROPION HYDROCHLORIDE 150 MG/1
300 TABLET ORAL EVERY MORNING
Status: DISCONTINUED | OUTPATIENT
Start: 2025-08-29 | End: 2025-08-28

## 2025-08-28 RX ORDER — FENTANYL CITRATE 50 UG/ML
25 INJECTION, SOLUTION INTRAMUSCULAR; INTRAVENOUS
Status: DISCONTINUED | OUTPATIENT
Start: 2025-08-28 | End: 2025-08-29

## 2025-08-28 RX ORDER — HYDROMORPHONE HYDROCHLORIDE 1 MG/ML
1 INJECTION, SOLUTION INTRAMUSCULAR; INTRAVENOUS; SUBCUTANEOUS ONCE
Refills: 0 | Status: COMPLETED | OUTPATIENT
Start: 2025-08-28 | End: 2025-08-28

## 2025-08-28 RX ORDER — PROCHLORPERAZINE EDISYLATE 5 MG/ML
5 INJECTION INTRAMUSCULAR; INTRAVENOUS
Status: DISCONTINUED | OUTPATIENT
Start: 2025-08-28 | End: 2025-09-02

## 2025-08-28 RX ORDER — IPRATROPIUM BROMIDE AND ALBUTEROL SULFATE 2.5; .5 MG/3ML; MG/3ML
SOLUTION RESPIRATORY (INHALATION)
Status: COMPLETED
Start: 2025-08-28 | End: 2025-08-28

## 2025-08-28 RX ORDER — ONDANSETRON 2 MG/ML
4 INJECTION INTRAMUSCULAR; INTRAVENOUS EVERY 6 HOURS PRN
Status: DISCONTINUED | OUTPATIENT
Start: 2025-08-28 | End: 2025-09-02

## 2025-08-28 RX ORDER — DILTIAZEM HYDROCHLORIDE 30 MG/1
30 TABLET, FILM COATED ORAL 4 TIMES DAILY
Status: DISCONTINUED | OUTPATIENT
Start: 2025-08-28 | End: 2025-09-02 | Stop reason: HOSPADM

## 2025-08-28 RX ORDER — DEXAMETHASONE SODIUM PHOSPHATE 10 MG/ML
INJECTION, SOLUTION INTRA-ARTICULAR; INTRALESIONAL; INTRAMUSCULAR; INTRAVENOUS; SOFT TISSUE
Status: DISCONTINUED | OUTPATIENT
Start: 2025-08-28 | End: 2025-08-28 | Stop reason: SDUPTHER

## 2025-08-28 RX ORDER — DILTIAZEM HYDROCHLORIDE 120 MG/1
120 CAPSULE, COATED, EXTENDED RELEASE ORAL DAILY
Status: DISCONTINUED | OUTPATIENT
Start: 2025-08-28 | End: 2025-08-28

## 2025-08-28 RX ORDER — BUPROPION HYDROCHLORIDE 75 MG/1
150 TABLET ORAL 2 TIMES DAILY
Status: DISCONTINUED | OUTPATIENT
Start: 2025-08-28 | End: 2025-09-02 | Stop reason: HOSPADM

## 2025-08-28 RX ORDER — HYDROMORPHONE HYDROCHLORIDE 1 MG/ML
1 INJECTION, SOLUTION INTRAMUSCULAR; INTRAVENOUS; SUBCUTANEOUS ONCE
Status: COMPLETED | OUTPATIENT
Start: 2025-08-28 | End: 2025-08-28

## 2025-08-28 RX ORDER — TRAZODONE HYDROCHLORIDE 50 MG/1
100 TABLET ORAL NIGHTLY
Status: DISCONTINUED | OUTPATIENT
Start: 2025-08-28 | End: 2025-08-29

## 2025-08-28 RX ORDER — SODIUM CHLORIDE, SODIUM LACTATE, POTASSIUM CHLORIDE, CALCIUM CHLORIDE 600; 310; 30; 20 MG/100ML; MG/100ML; MG/100ML; MG/100ML
INJECTION, SOLUTION INTRAVENOUS
Status: DISCONTINUED | OUTPATIENT
Start: 2025-08-28 | End: 2025-08-28 | Stop reason: SDUPTHER

## 2025-08-28 RX ORDER — PROPOFOL 10 MG/ML
INJECTION, EMULSION INTRAVENOUS
Status: DISCONTINUED | OUTPATIENT
Start: 2025-08-28 | End: 2025-08-28 | Stop reason: SDUPTHER

## 2025-08-28 RX ORDER — ONDANSETRON 4 MG/1
4 TABLET, ORALLY DISINTEGRATING ORAL EVERY 8 HOURS PRN
Status: DISCONTINUED | OUTPATIENT
Start: 2025-08-28 | End: 2025-09-02

## 2025-08-28 RX ORDER — BUMETANIDE 1 MG/1
2 TABLET ORAL 2 TIMES DAILY
Status: DISCONTINUED | OUTPATIENT
Start: 2025-08-28 | End: 2025-09-02 | Stop reason: HOSPADM

## 2025-08-28 RX ORDER — OXYCODONE HYDROCHLORIDE 5 MG/1
2.5 TABLET ORAL EVERY 6 HOURS PRN
Refills: 0 | Status: DISCONTINUED | OUTPATIENT
Start: 2025-08-28 | End: 2025-08-31

## 2025-08-28 RX ORDER — ROCURONIUM BROMIDE 10 MG/ML
INJECTION, SOLUTION INTRAVENOUS
Status: DISCONTINUED | OUTPATIENT
Start: 2025-08-28 | End: 2025-08-28 | Stop reason: SDUPTHER

## 2025-08-28 RX ORDER — FENTANYL CITRATE 50 UG/ML
INJECTION, SOLUTION INTRAMUSCULAR; INTRAVENOUS
Status: DISCONTINUED | OUTPATIENT
Start: 2025-08-28 | End: 2025-08-28 | Stop reason: SDUPTHER

## 2025-08-28 RX ORDER — ENOXAPARIN SODIUM 100 MG/ML
40 INJECTION SUBCUTANEOUS DAILY
Status: DISCONTINUED | OUTPATIENT
Start: 2025-08-29 | End: 2025-08-30

## 2025-08-28 RX ADMIN — METRONIDAZOLE 500 MG: 500 INJECTION, SOLUTION INTRAVENOUS at 08:00

## 2025-08-28 RX ADMIN — DEXAMETHASONE SODIUM PHOSPHATE 10 MG: 10 INJECTION INTRAMUSCULAR; INTRAVENOUS at 09:18

## 2025-08-28 RX ADMIN — SODIUM CHLORIDE, PRESERVATIVE FREE 10 ML: 5 INJECTION INTRAVENOUS at 13:11

## 2025-08-28 RX ADMIN — LIDOCAINE HYDROCHLORIDE 50 MG: 10 INJECTION, SOLUTION EPIDURAL; INFILTRATION; INTRACAUDAL; PERINEURAL at 07:40

## 2025-08-28 RX ADMIN — OXYCODONE HYDROCHLORIDE 2.5 MG: 5 TABLET ORAL at 13:40

## 2025-08-28 RX ADMIN — SUGAMMADEX 200 MG: 100 INJECTION, SOLUTION INTRAVENOUS at 10:07

## 2025-08-28 RX ADMIN — IPRATROPIUM BROMIDE AND ALBUTEROL SULFATE 1 DOSE: 2.5; .5 SOLUTION RESPIRATORY (INHALATION) at 07:19

## 2025-08-28 RX ADMIN — ROCURONIUM BROMIDE 10 MG: 10 INJECTION, SOLUTION INTRAVENOUS at 09:33

## 2025-08-28 RX ADMIN — SODIUM CHLORIDE: 9 INJECTION, SOLUTION INTRAVENOUS at 07:46

## 2025-08-28 RX ADMIN — SODIUM CHLORIDE: 9 INJECTION, SOLUTION INTRAVENOUS at 08:21

## 2025-08-28 RX ADMIN — PROCHLORPERAZINE EDISYLATE 10 MG: 5 INJECTION INTRAMUSCULAR; INTRAVENOUS at 05:04

## 2025-08-28 RX ADMIN — IPRATROPIUM BROMIDE AND ALBUTEROL SULFATE 1 DOSE: .5; 2.5 SOLUTION RESPIRATORY (INHALATION) at 16:44

## 2025-08-28 RX ADMIN — PROPOFOL 100 MG: 10 INJECTION, EMULSION INTRAVENOUS at 07:40

## 2025-08-28 RX ADMIN — ROCURONIUM BROMIDE 20 MG: 10 INJECTION, SOLUTION INTRAVENOUS at 08:45

## 2025-08-28 RX ADMIN — DILTIAZEM HYDROCHLORIDE 30 MG: 30 TABLET, FILM COATED ORAL at 22:11

## 2025-08-28 RX ADMIN — HYDROMORPHONE HYDROCHLORIDE 1 MG: 1 INJECTION, SOLUTION INTRAMUSCULAR; INTRAVENOUS; SUBCUTANEOUS at 00:45

## 2025-08-28 RX ADMIN — LEVOTHYROXINE SODIUM 200 MCG: 0.07 TABLET ORAL at 18:57

## 2025-08-28 RX ADMIN — FENTANYL CITRATE 50 MCG: 50 INJECTION, SOLUTION INTRAMUSCULAR; INTRAVENOUS at 08:11

## 2025-08-28 RX ADMIN — SODIUM CHLORIDE, POTASSIUM CHLORIDE, SODIUM LACTATE AND CALCIUM CHLORIDE: 600; 310; 30; 20 INJECTION, SOLUTION INTRAVENOUS at 07:33

## 2025-08-28 RX ADMIN — BUPIVACAINE HYDROCHLORIDE AND EPINEPHRINE 30 ML: 5; 5 INJECTION, SOLUTION EPIDURAL; INTRACAUDAL; PERINEURAL at 10:23

## 2025-08-28 RX ADMIN — BUMETANIDE 2 MG: 1 TABLET ORAL at 22:04

## 2025-08-28 RX ADMIN — IPRATROPIUM BROMIDE AND ALBUTEROL SULFATE 1 DOSE: .5; 2.5 SOLUTION RESPIRATORY (INHALATION) at 21:01

## 2025-08-28 RX ADMIN — FENTANYL CITRATE 50 MCG: 50 INJECTION, SOLUTION INTRAMUSCULAR; INTRAVENOUS at 08:18

## 2025-08-28 RX ADMIN — SUCCINYLCHOLINE CHLORIDE 180 MG: 20 INJECTION, SOLUTION INTRAMUSCULAR; INTRAVENOUS at 07:40

## 2025-08-28 RX ADMIN — CEFAZOLIN 2000 MG: 1 INJECTION, POWDER, FOR SOLUTION INTRAMUSCULAR; INTRAVENOUS at 07:58

## 2025-08-28 RX ADMIN — PIPERACILLIN AND TAZOBACTAM 3375 MG: 3; .375 INJECTION, POWDER, LYOPHILIZED, FOR SOLUTION INTRAVENOUS at 18:59

## 2025-08-28 RX ADMIN — ACETAMINOPHEN 1000 MG: 500 TABLET ORAL at 13:40

## 2025-08-28 RX ADMIN — FENTANYL CITRATE 50 MCG: 50 INJECTION, SOLUTION INTRAMUSCULAR; INTRAVENOUS at 08:15

## 2025-08-28 RX ADMIN — ACETAMINOPHEN 1000 MG: 500 TABLET ORAL at 22:03

## 2025-08-28 RX ADMIN — ONDANSETRON 4 MG: 2 INJECTION, SOLUTION INTRAMUSCULAR; INTRAVENOUS at 09:18

## 2025-08-28 RX ADMIN — IPRATROPIUM BROMIDE AND ALBUTEROL SULFATE 1 DOSE: .5; 2.5 SOLUTION RESPIRATORY (INHALATION) at 07:19

## 2025-08-28 RX ADMIN — HYDROMORPHONE HYDROCHLORIDE 1 MG: 1 INJECTION, SOLUTION INTRAMUSCULAR; INTRAVENOUS; SUBCUTANEOUS at 03:07

## 2025-08-28 RX ADMIN — HYDROMORPHONE HYDROCHLORIDE 1 MG: 1 INJECTION, SOLUTION INTRAMUSCULAR; INTRAVENOUS; SUBCUTANEOUS at 04:45

## 2025-08-28 RX ADMIN — SODIUM CHLORIDE, PRESERVATIVE FREE 10 ML: 5 INJECTION INTRAVENOUS at 22:03

## 2025-08-28 RX ADMIN — ATORVASTATIN CALCIUM 40 MG: 40 TABLET, FILM COATED ORAL at 22:03

## 2025-08-28 RX ADMIN — DEXMEDETOMIDINE HYDROCHLORIDE 0.4 ML: 100 INJECTION, SOLUTION INTRAVENOUS at 10:23

## 2025-08-28 RX ADMIN — ONDANSETRON 4 MG: 2 INJECTION, SOLUTION INTRAMUSCULAR; INTRAVENOUS at 13:09

## 2025-08-28 RX ADMIN — BUPROPION HYDROCHLORIDE 150 MG: 75 TABLET, FILM COATED ORAL at 22:03

## 2025-08-28 RX ADMIN — ROCURONIUM BROMIDE 50 MG: 10 INJECTION, SOLUTION INTRAVENOUS at 07:40

## 2025-08-28 RX ADMIN — TRAZODONE HYDROCHLORIDE 100 MG: 50 TABLET ORAL at 22:03

## 2025-08-28 RX ADMIN — FENTANYL CITRATE 50 MCG: 50 INJECTION, SOLUTION INTRAMUSCULAR; INTRAVENOUS at 08:03

## 2025-08-28 RX ADMIN — ROCURONIUM BROMIDE 10 MG: 10 INJECTION, SOLUTION INTRAVENOUS at 08:58

## 2025-08-28 RX ADMIN — SODIUM CHLORIDE, SODIUM LACTATE, POTASSIUM CHLORIDE, AND CALCIUM CHLORIDE: .6; .31; .03; .02 INJECTION, SOLUTION INTRAVENOUS at 11:17

## 2025-08-28 RX ADMIN — DILTIAZEM HYDROCHLORIDE 30 MG: 30 TABLET, FILM COATED ORAL at 18:58

## 2025-08-28 RX ADMIN — LABETALOL HYDROCHLORIDE 5 MG: 5 INJECTION, SOLUTION INTRAVENOUS at 08:24

## 2025-08-28 RX ADMIN — SODIUM CHLORIDE, POTASSIUM CHLORIDE, SODIUM LACTATE AND CALCIUM CHLORIDE: 600; 310; 30; 20 INJECTION, SOLUTION INTRAVENOUS at 09:20

## 2025-08-28 ASSESSMENT — PAIN DESCRIPTION - LOCATION
LOCATION: ABDOMEN
LOCATION: ABDOMEN

## 2025-08-28 ASSESSMENT — PAIN SCALES - GENERAL
PAINLEVEL_OUTOF10: 0
PAINLEVEL_OUTOF10: 0
PAINLEVEL_OUTOF10: 10

## 2025-08-28 ASSESSMENT — PAIN - FUNCTIONAL ASSESSMENT
PAIN_FUNCTIONAL_ASSESSMENT: 0-10

## 2025-08-28 ASSESSMENT — COPD QUESTIONNAIRES: CAT_SEVERITY: SEVERE

## 2025-08-28 ASSESSMENT — PAIN DESCRIPTION - DESCRIPTORS
DESCRIPTORS: STABBING;SHARP
DESCRIPTORS: CRAMPING

## 2025-08-29 LAB
ANION GAP SERPL CALCULATED.3IONS-SCNC: 10 MMOL/L (ref 9–16)
BASOPHILS # BLD: <0.03 K/UL (ref 0–0.2)
BASOPHILS NFR BLD: 0 % (ref 0–2)
BUN SERPL-MCNC: 14 MG/DL (ref 8–23)
CALCIUM SERPL-MCNC: 8.4 MG/DL (ref 8.6–10.4)
CHLORIDE SERPL-SCNC: 96 MMOL/L (ref 98–107)
CO2 SERPL-SCNC: 30 MMOL/L (ref 20–31)
CREAT SERPL-MCNC: 0.5 MG/DL (ref 0.6–0.9)
EOSINOPHIL # BLD: <0.03 K/UL (ref 0–0.44)
EOSINOPHILS RELATIVE PERCENT: 0 % (ref 1–4)
ERYTHROCYTE [DISTWIDTH] IN BLOOD BY AUTOMATED COUNT: 13.9 % (ref 11.8–14.4)
GFR, ESTIMATED: >90 ML/MIN/1.73M2
GLUCOSE BLD-MCNC: 122 MG/DL (ref 65–105)
GLUCOSE SERPL-MCNC: 102 MG/DL (ref 74–99)
HCT VFR BLD AUTO: 37.6 % (ref 36.3–47.1)
HGB BLD-MCNC: 12.5 G/DL (ref 11.9–15.1)
IMM GRANULOCYTES # BLD AUTO: 0.07 K/UL (ref 0–0.3)
IMM GRANULOCYTES NFR BLD: 1 %
LYMPHOCYTES NFR BLD: 1.06 K/UL (ref 1.1–3.7)
LYMPHOCYTES RELATIVE PERCENT: 8 % (ref 24–43)
MAGNESIUM SERPL-MCNC: 1.9 MG/DL (ref 1.6–2.4)
MCH RBC QN AUTO: 30.9 PG (ref 25.2–33.5)
MCHC RBC AUTO-ENTMCNC: 33.2 G/DL (ref 28.4–34.8)
MCV RBC AUTO: 93.1 FL (ref 82.6–102.9)
MONOCYTES NFR BLD: 1.19 K/UL (ref 0.1–1.2)
MONOCYTES NFR BLD: 9 % (ref 3–12)
NEUTROPHILS NFR BLD: 83 % (ref 36–65)
NEUTS SEG NFR BLD: 11.17 K/UL (ref 1.5–8.1)
NRBC BLD-RTO: 0 PER 100 WBC
PHOSPHATE SERPL-MCNC: 2.5 MG/DL (ref 2.5–4.5)
PLATELET # BLD AUTO: 229 K/UL (ref 138–453)
PMV BLD AUTO: 9.5 FL (ref 8.1–13.5)
POTASSIUM SERPL-SCNC: 3.7 MMOL/L (ref 3.7–5.3)
RBC # BLD AUTO: 4.04 M/UL (ref 3.95–5.11)
SODIUM SERPL-SCNC: 136 MMOL/L (ref 136–145)
WBC OTHER # BLD: 13.9 K/UL (ref 3.5–11.3)

## 2025-08-29 PROCEDURE — 6360000002 HC RX W HCPCS

## 2025-08-29 PROCEDURE — 2700000000 HC OXYGEN THERAPY PER DAY

## 2025-08-29 PROCEDURE — 94761 N-INVAS EAR/PLS OXIMETRY MLT: CPT

## 2025-08-29 PROCEDURE — 97530 THERAPEUTIC ACTIVITIES: CPT

## 2025-08-29 PROCEDURE — 36415 COLL VENOUS BLD VENIPUNCTURE: CPT

## 2025-08-29 PROCEDURE — 85025 COMPLETE CBC W/AUTO DIFF WBC: CPT

## 2025-08-29 PROCEDURE — 6370000000 HC RX 637 (ALT 250 FOR IP)

## 2025-08-29 PROCEDURE — 84100 ASSAY OF PHOSPHORUS: CPT

## 2025-08-29 PROCEDURE — 2500000003 HC RX 250 WO HCPCS

## 2025-08-29 PROCEDURE — 97166 OT EVAL MOD COMPLEX 45 MIN: CPT

## 2025-08-29 PROCEDURE — 2580000003 HC RX 258

## 2025-08-29 PROCEDURE — 2060000000 HC ICU INTERMEDIATE R&B

## 2025-08-29 PROCEDURE — 97162 PT EVAL MOD COMPLEX 30 MIN: CPT

## 2025-08-29 PROCEDURE — 51798 US URINE CAPACITY MEASURE: CPT

## 2025-08-29 PROCEDURE — 80048 BASIC METABOLIC PNL TOTAL CA: CPT

## 2025-08-29 PROCEDURE — 82947 ASSAY GLUCOSE BLOOD QUANT: CPT

## 2025-08-29 PROCEDURE — 97535 SELF CARE MNGMENT TRAINING: CPT

## 2025-08-29 PROCEDURE — 94640 AIRWAY INHALATION TREATMENT: CPT

## 2025-08-29 PROCEDURE — 83735 ASSAY OF MAGNESIUM: CPT

## 2025-08-29 RX ORDER — GABAPENTIN 100 MG/1
100 CAPSULE ORAL EVERY 8 HOURS SCHEDULED
Status: DISCONTINUED | OUTPATIENT
Start: 2025-08-29 | End: 2025-09-02

## 2025-08-29 RX ORDER — HYDROMORPHONE HYDROCHLORIDE 2 MG/1
2 TABLET ORAL ONCE
Status: COMPLETED | OUTPATIENT
Start: 2025-08-29 | End: 2025-08-29

## 2025-08-29 RX ORDER — OXYCODONE HYDROCHLORIDE 5 MG/1
2.5 TABLET ORAL EVERY 6 HOURS SCHEDULED
Refills: 0 | Status: CANCELLED | OUTPATIENT
Start: 2025-08-29

## 2025-08-29 RX ORDER — DEXTROSE MONOHYDRATE, SODIUM CHLORIDE, AND POTASSIUM CHLORIDE 50; 1.49; 4.5 G/1000ML; G/1000ML; G/1000ML
INJECTION, SOLUTION INTRAVENOUS CONTINUOUS
Status: DISCONTINUED | OUTPATIENT
Start: 2025-08-29 | End: 2025-09-01

## 2025-08-29 RX ORDER — TRAZODONE HYDROCHLORIDE 50 MG/1
200 TABLET ORAL NIGHTLY
Status: DISCONTINUED | OUTPATIENT
Start: 2025-08-30 | End: 2025-09-02 | Stop reason: HOSPADM

## 2025-08-29 RX ORDER — MAGNESIUM SULFATE IN WATER 40 MG/ML
2000 INJECTION, SOLUTION INTRAVENOUS ONCE
Status: COMPLETED | OUTPATIENT
Start: 2025-08-29 | End: 2025-08-29

## 2025-08-29 RX ORDER — METHOCARBAMOL 500 MG/1
500 TABLET, FILM COATED ORAL EVERY 6 HOURS SCHEDULED
Status: DISCONTINUED | OUTPATIENT
Start: 2025-08-29 | End: 2025-09-02

## 2025-08-29 RX ADMIN — SODIUM CHLORIDE, PRESERVATIVE FREE 10 ML: 5 INJECTION INTRAVENOUS at 08:31

## 2025-08-29 RX ADMIN — ACETAMINOPHEN 1000 MG: 500 TABLET ORAL at 13:30

## 2025-08-29 RX ADMIN — IPRATROPIUM BROMIDE AND ALBUTEROL SULFATE 1 DOSE: .5; 2.5 SOLUTION RESPIRATORY (INHALATION) at 20:25

## 2025-08-29 RX ADMIN — GABAPENTIN 100 MG: 100 CAPSULE ORAL at 20:21

## 2025-08-29 RX ADMIN — GABAPENTIN 100 MG: 100 CAPSULE ORAL at 13:30

## 2025-08-29 RX ADMIN — OXYCODONE HYDROCHLORIDE 2.5 MG: 5 TABLET ORAL at 06:01

## 2025-08-29 RX ADMIN — PIPERACILLIN AND TAZOBACTAM 3375 MG: 3; .375 INJECTION, POWDER, LYOPHILIZED, FOR SOLUTION INTRAVENOUS at 18:46

## 2025-08-29 RX ADMIN — SPIRONOLACTONE 50 MG: 25 TABLET, FILM COATED ORAL at 08:28

## 2025-08-29 RX ADMIN — TRAZODONE HYDROCHLORIDE 100 MG: 50 TABLET ORAL at 20:22

## 2025-08-29 RX ADMIN — DEXTROSE, SODIUM CHLORIDE, AND POTASSIUM CHLORIDE: 5; .45; .15 INJECTION INTRAVENOUS at 16:33

## 2025-08-29 RX ADMIN — SODIUM CHLORIDE, PRESERVATIVE FREE 10 ML: 5 INJECTION INTRAVENOUS at 20:46

## 2025-08-29 RX ADMIN — HYDROMORPHONE HYDROCHLORIDE 2 MG: 2 TABLET ORAL at 10:39

## 2025-08-29 RX ADMIN — ACETAMINOPHEN 1000 MG: 500 TABLET ORAL at 20:22

## 2025-08-29 RX ADMIN — IPRATROPIUM BROMIDE AND ALBUTEROL SULFATE 1 DOSE: .5; 2.5 SOLUTION RESPIRATORY (INHALATION) at 15:19

## 2025-08-29 RX ADMIN — BUMETANIDE 2 MG: 1 TABLET ORAL at 08:28

## 2025-08-29 RX ADMIN — IPRATROPIUM BROMIDE AND ALBUTEROL SULFATE 1 DOSE: .5; 2.5 SOLUTION RESPIRATORY (INHALATION) at 07:37

## 2025-08-29 RX ADMIN — OXYCODONE HYDROCHLORIDE 2.5 MG: 5 TABLET ORAL at 20:25

## 2025-08-29 RX ADMIN — IPRATROPIUM BROMIDE AND ALBUTEROL SULFATE 1 DOSE: .5; 2.5 SOLUTION RESPIRATORY (INHALATION) at 11:51

## 2025-08-29 RX ADMIN — SODIUM CHLORIDE, PRESERVATIVE FREE 10 ML: 5 INJECTION INTRAVENOUS at 20:22

## 2025-08-29 RX ADMIN — DILTIAZEM HYDROCHLORIDE 30 MG: 30 TABLET, FILM COATED ORAL at 20:21

## 2025-08-29 RX ADMIN — ATORVASTATIN CALCIUM 40 MG: 40 TABLET, FILM COATED ORAL at 20:22

## 2025-08-29 RX ADMIN — ACETAMINOPHEN 1000 MG: 500 TABLET ORAL at 05:54

## 2025-08-29 RX ADMIN — OXYCODONE HYDROCHLORIDE 2.5 MG: 5 TABLET ORAL at 13:31

## 2025-08-29 RX ADMIN — BUMETANIDE 2 MG: 1 TABLET ORAL at 20:22

## 2025-08-29 RX ADMIN — METHOCARBAMOL 500 MG: 500 TABLET ORAL at 13:30

## 2025-08-29 RX ADMIN — DILTIAZEM HYDROCHLORIDE 30 MG: 30 TABLET, FILM COATED ORAL at 13:30

## 2025-08-29 RX ADMIN — MAGNESIUM SULFATE HEPTAHYDRATE 2000 MG: 40 INJECTION, SOLUTION INTRAVENOUS at 10:53

## 2025-08-29 RX ADMIN — BUPROPION HYDROCHLORIDE 150 MG: 75 TABLET, FILM COATED ORAL at 20:21

## 2025-08-29 RX ADMIN — DILTIAZEM HYDROCHLORIDE 30 MG: 30 TABLET, FILM COATED ORAL at 08:28

## 2025-08-29 RX ADMIN — ONDANSETRON 4 MG: 2 INJECTION, SOLUTION INTRAMUSCULAR; INTRAVENOUS at 20:46

## 2025-08-29 RX ADMIN — METHOCARBAMOL 500 MG: 500 TABLET ORAL at 16:34

## 2025-08-29 RX ADMIN — PIPERACILLIN AND TAZOBACTAM 3375 MG: 3; .375 INJECTION, POWDER, LYOPHILIZED, FOR SOLUTION INTRAVENOUS at 10:54

## 2025-08-29 RX ADMIN — ENOXAPARIN SODIUM 40 MG: 100 INJECTION SUBCUTANEOUS at 08:28

## 2025-08-29 RX ADMIN — PIPERACILLIN AND TAZOBACTAM 3375 MG: 3; .375 INJECTION, POWDER, LYOPHILIZED, FOR SOLUTION INTRAVENOUS at 02:58

## 2025-08-29 RX ADMIN — BUPROPION HYDROCHLORIDE 150 MG: 75 TABLET, FILM COATED ORAL at 08:28

## 2025-08-29 RX ADMIN — LEVOTHYROXINE SODIUM 200 MCG: 0.07 TABLET ORAL at 05:54

## 2025-08-29 RX ADMIN — DILTIAZEM HYDROCHLORIDE 30 MG: 30 TABLET, FILM COATED ORAL at 16:34

## 2025-08-29 ASSESSMENT — PAIN SCALES - GENERAL
PAINLEVEL_OUTOF10: 10
PAINLEVEL_OUTOF10: 8
PAINLEVEL_OUTOF10: 7
PAINLEVEL_OUTOF10: 9
PAINLEVEL_OUTOF10: 4

## 2025-08-29 ASSESSMENT — PAIN DESCRIPTION - PAIN TYPE: TYPE: ACUTE PAIN;SURGICAL PAIN

## 2025-08-29 ASSESSMENT — PAIN DESCRIPTION - ORIENTATION: ORIENTATION: RIGHT;LEFT;ANTERIOR

## 2025-08-29 ASSESSMENT — PAIN DESCRIPTION - FREQUENCY: FREQUENCY: CONTINUOUS

## 2025-08-29 ASSESSMENT — PAIN - FUNCTIONAL ASSESSMENT
PAIN_FUNCTIONAL_ASSESSMENT: PREVENTS OR INTERFERES SOME ACTIVE ACTIVITIES AND ADLS
PAIN_FUNCTIONAL_ASSESSMENT: 0-10

## 2025-08-29 ASSESSMENT — PAIN DESCRIPTION - LOCATION
LOCATION: ABDOMEN
LOCATION: ABDOMEN

## 2025-08-29 ASSESSMENT — PAIN DESCRIPTION - DESCRIPTORS: DESCRIPTORS: SORE

## 2025-08-29 ASSESSMENT — PAIN DESCRIPTION - ONSET: ONSET: ON-GOING

## 2025-08-30 LAB
ANION GAP SERPL CALCULATED.3IONS-SCNC: 11 MMOL/L (ref 9–16)
BASOPHILS # BLD: <0.03 K/UL (ref 0–0.2)
BASOPHILS NFR BLD: 0 % (ref 0–2)
BUN SERPL-MCNC: 10 MG/DL (ref 8–23)
CALCIUM SERPL-MCNC: 8.9 MG/DL (ref 8.6–10.4)
CHLORIDE SERPL-SCNC: 94 MMOL/L (ref 98–107)
CO2 SERPL-SCNC: 31 MMOL/L (ref 20–31)
CREAT SERPL-MCNC: 0.6 MG/DL (ref 0.6–0.9)
EOSINOPHIL # BLD: 0.08 K/UL (ref 0–0.44)
EOSINOPHILS RELATIVE PERCENT: 1 % (ref 1–4)
ERYTHROCYTE [DISTWIDTH] IN BLOOD BY AUTOMATED COUNT: 13.9 % (ref 11.8–14.4)
GFR, ESTIMATED: >90 ML/MIN/1.73M2
GLUCOSE BLD-MCNC: 136 MG/DL (ref 65–105)
GLUCOSE SERPL-MCNC: 124 MG/DL (ref 74–99)
HCT VFR BLD AUTO: 34.2 % (ref 36.3–47.1)
HGB BLD-MCNC: 11.8 G/DL (ref 11.9–15.1)
IMM GRANULOCYTES # BLD AUTO: 0.06 K/UL (ref 0–0.3)
IMM GRANULOCYTES NFR BLD: 1 %
LYMPHOCYTES NFR BLD: 1.19 K/UL (ref 1.1–3.7)
LYMPHOCYTES RELATIVE PERCENT: 13 % (ref 24–43)
MAGNESIUM SERPL-MCNC: 2.6 MG/DL (ref 1.6–2.4)
MCH RBC QN AUTO: 31.6 PG (ref 25.2–33.5)
MCHC RBC AUTO-ENTMCNC: 34.5 G/DL (ref 28.4–34.8)
MCV RBC AUTO: 91.4 FL (ref 82.6–102.9)
MONOCYTES NFR BLD: 0.94 K/UL (ref 0.1–1.2)
MONOCYTES NFR BLD: 10 % (ref 3–12)
NEUTROPHILS NFR BLD: 76 % (ref 36–65)
NEUTS SEG NFR BLD: 7.19 K/UL (ref 1.5–8.1)
NRBC BLD-RTO: 0 PER 100 WBC
PHOSPHATE SERPL-MCNC: 2 MG/DL (ref 2.5–4.5)
PLATELET # BLD AUTO: ABNORMAL K/UL (ref 138–453)
PLATELET, FLUORESCENCE: ABNORMAL K/UL (ref 138–453)
POTASSIUM SERPL-SCNC: 4.3 MMOL/L (ref 3.7–5.3)
RBC # BLD AUTO: 3.74 M/UL (ref 3.95–5.11)
SODIUM SERPL-SCNC: 136 MMOL/L (ref 136–145)
WBC OTHER # BLD: 9.5 K/UL (ref 3.5–11.3)

## 2025-08-30 PROCEDURE — 82947 ASSAY GLUCOSE BLOOD QUANT: CPT

## 2025-08-30 PROCEDURE — 6360000002 HC RX W HCPCS

## 2025-08-30 PROCEDURE — 6370000000 HC RX 637 (ALT 250 FOR IP)

## 2025-08-30 PROCEDURE — 51798 US URINE CAPACITY MEASURE: CPT

## 2025-08-30 PROCEDURE — 36415 COLL VENOUS BLD VENIPUNCTURE: CPT

## 2025-08-30 PROCEDURE — 2060000000 HC ICU INTERMEDIATE R&B

## 2025-08-30 PROCEDURE — 97530 THERAPEUTIC ACTIVITIES: CPT

## 2025-08-30 PROCEDURE — 83735 ASSAY OF MAGNESIUM: CPT

## 2025-08-30 PROCEDURE — 85025 COMPLETE CBC W/AUTO DIFF WBC: CPT

## 2025-08-30 PROCEDURE — 80048 BASIC METABOLIC PNL TOTAL CA: CPT

## 2025-08-30 PROCEDURE — 85055 RETICULATED PLATELET ASSAY: CPT

## 2025-08-30 PROCEDURE — 2580000003 HC RX 258

## 2025-08-30 PROCEDURE — 94761 N-INVAS EAR/PLS OXIMETRY MLT: CPT

## 2025-08-30 PROCEDURE — 2700000000 HC OXYGEN THERAPY PER DAY

## 2025-08-30 PROCEDURE — 6370000000 HC RX 637 (ALT 250 FOR IP): Performed by: PHYSICIAN ASSISTANT

## 2025-08-30 PROCEDURE — 94640 AIRWAY INHALATION TREATMENT: CPT

## 2025-08-30 PROCEDURE — 97110 THERAPEUTIC EXERCISES: CPT

## 2025-08-30 PROCEDURE — 84100 ASSAY OF PHOSPHORUS: CPT

## 2025-08-30 PROCEDURE — 2500000003 HC RX 250 WO HCPCS

## 2025-08-30 PROCEDURE — 6360000002 HC RX W HCPCS: Performed by: PHYSICIAN ASSISTANT

## 2025-08-30 RX ORDER — HEPARIN SODIUM 5000 [USP'U]/ML
5000 INJECTION, SOLUTION INTRAVENOUS; SUBCUTANEOUS EVERY 8 HOURS SCHEDULED
Status: DISCONTINUED | OUTPATIENT
Start: 2025-08-30 | End: 2025-09-01

## 2025-08-30 RX ORDER — FENTANYL CITRATE 50 UG/ML
25 INJECTION, SOLUTION INTRAMUSCULAR; INTRAVENOUS ONCE
Status: COMPLETED | OUTPATIENT
Start: 2025-08-30 | End: 2025-08-30

## 2025-08-30 RX ORDER — MAGNESIUM SULFATE IN WATER 40 MG/ML
2000 INJECTION, SOLUTION INTRAVENOUS ONCE
Status: COMPLETED | OUTPATIENT
Start: 2025-08-30 | End: 2025-08-30

## 2025-08-30 RX ORDER — OXYCODONE HYDROCHLORIDE 5 MG/1
5 TABLET ORAL ONCE
Status: COMPLETED | OUTPATIENT
Start: 2025-08-30 | End: 2025-08-30

## 2025-08-30 RX ORDER — POLYETHYLENE GLYCOL 3350 17 G/17G
17 POWDER, FOR SOLUTION ORAL DAILY
Status: DISCONTINUED | OUTPATIENT
Start: 2025-08-30 | End: 2025-09-02 | Stop reason: HOSPADM

## 2025-08-30 RX ORDER — OXYCODONE HYDROCHLORIDE 5 MG/1
5 TABLET ORAL ONCE
Refills: 0 | Status: COMPLETED | OUTPATIENT
Start: 2025-08-30 | End: 2025-08-30

## 2025-08-30 RX ADMIN — GABAPENTIN 100 MG: 100 CAPSULE ORAL at 13:16

## 2025-08-30 RX ADMIN — DEXTROSE, SODIUM CHLORIDE, AND POTASSIUM CHLORIDE: 5; .45; .15 INJECTION INTRAVENOUS at 13:55

## 2025-08-30 RX ADMIN — MAGNESIUM SULFATE HEPTAHYDRATE 2000 MG: 40 INJECTION, SOLUTION INTRAVENOUS at 07:59

## 2025-08-30 RX ADMIN — DILTIAZEM HYDROCHLORIDE 30 MG: 30 TABLET, FILM COATED ORAL at 20:31

## 2025-08-30 RX ADMIN — BUMETANIDE 2 MG: 1 TABLET ORAL at 20:30

## 2025-08-30 RX ADMIN — IPRATROPIUM BROMIDE AND ALBUTEROL SULFATE 1 DOSE: .5; 2.5 SOLUTION RESPIRATORY (INHALATION) at 15:25

## 2025-08-30 RX ADMIN — BUMETANIDE 2 MG: 1 TABLET ORAL at 07:55

## 2025-08-30 RX ADMIN — POTASSIUM BICARBONATE 40 MEQ: 782 TABLET, EFFERVESCENT ORAL at 07:55

## 2025-08-30 RX ADMIN — IPRATROPIUM BROMIDE AND ALBUTEROL SULFATE 1 DOSE: .5; 2.5 SOLUTION RESPIRATORY (INHALATION) at 20:32

## 2025-08-30 RX ADMIN — DILTIAZEM HYDROCHLORIDE 30 MG: 30 TABLET, FILM COATED ORAL at 17:29

## 2025-08-30 RX ADMIN — IPRATROPIUM BROMIDE AND ALBUTEROL SULFATE 1 DOSE: .5; 2.5 SOLUTION RESPIRATORY (INHALATION) at 12:12

## 2025-08-30 RX ADMIN — GABAPENTIN 100 MG: 100 CAPSULE ORAL at 06:18

## 2025-08-30 RX ADMIN — BUPROPION HYDROCHLORIDE 150 MG: 75 TABLET, FILM COATED ORAL at 20:30

## 2025-08-30 RX ADMIN — ENOXAPARIN SODIUM 40 MG: 100 INJECTION SUBCUTANEOUS at 07:59

## 2025-08-30 RX ADMIN — PIPERACILLIN AND TAZOBACTAM 3375 MG: 3; .375 INJECTION, POWDER, LYOPHILIZED, FOR SOLUTION INTRAVENOUS at 18:25

## 2025-08-30 RX ADMIN — LEVOTHYROXINE SODIUM 200 MCG: 0.07 TABLET ORAL at 06:18

## 2025-08-30 RX ADMIN — METHOCARBAMOL 500 MG: 500 TABLET ORAL at 11:09

## 2025-08-30 RX ADMIN — OXYCODONE HYDROCHLORIDE 5 MG: 5 TABLET ORAL at 00:49

## 2025-08-30 RX ADMIN — DEXTROSE, SODIUM CHLORIDE, AND POTASSIUM CHLORIDE: 5; .45; .15 INJECTION INTRAVENOUS at 03:34

## 2025-08-30 RX ADMIN — METHOCARBAMOL 500 MG: 500 TABLET ORAL at 00:49

## 2025-08-30 RX ADMIN — SPIRONOLACTONE 50 MG: 25 TABLET, FILM COATED ORAL at 07:55

## 2025-08-30 RX ADMIN — OXYCODONE HYDROCHLORIDE 5 MG: 5 TABLET ORAL at 13:54

## 2025-08-30 RX ADMIN — IPRATROPIUM BROMIDE AND ALBUTEROL SULFATE 1 DOSE: .5; 2.5 SOLUTION RESPIRATORY (INHALATION) at 08:30

## 2025-08-30 RX ADMIN — SODIUM CHLORIDE, PRESERVATIVE FREE 10 ML: 5 INJECTION INTRAVENOUS at 07:56

## 2025-08-30 RX ADMIN — ACETAMINOPHEN 1000 MG: 500 TABLET ORAL at 13:16

## 2025-08-30 RX ADMIN — DILTIAZEM HYDROCHLORIDE 30 MG: 30 TABLET, FILM COATED ORAL at 07:55

## 2025-08-30 RX ADMIN — GABAPENTIN 100 MG: 100 CAPSULE ORAL at 20:30

## 2025-08-30 RX ADMIN — METHOCARBAMOL 500 MG: 500 TABLET ORAL at 17:30

## 2025-08-30 RX ADMIN — PIPERACILLIN AND TAZOBACTAM 3375 MG: 3; .375 INJECTION, POWDER, LYOPHILIZED, FOR SOLUTION INTRAVENOUS at 03:33

## 2025-08-30 RX ADMIN — SODIUM CHLORIDE, PRESERVATIVE FREE 10 ML: 5 INJECTION INTRAVENOUS at 03:35

## 2025-08-30 RX ADMIN — HEPARIN SODIUM 5000 UNITS: 5000 INJECTION, SOLUTION INTRAVENOUS; SUBCUTANEOUS at 22:12

## 2025-08-30 RX ADMIN — TRAZODONE HYDROCHLORIDE 200 MG: 50 TABLET ORAL at 20:29

## 2025-08-30 RX ADMIN — FENTANYL CITRATE 25 MCG: 50 INJECTION INTRAMUSCULAR; INTRAVENOUS at 11:17

## 2025-08-30 RX ADMIN — ATORVASTATIN CALCIUM 40 MG: 40 TABLET, FILM COATED ORAL at 20:30

## 2025-08-30 RX ADMIN — SODIUM CHLORIDE, PRESERVATIVE FREE 10 ML: 5 INJECTION INTRAVENOUS at 21:51

## 2025-08-30 RX ADMIN — ACETAMINOPHEN 1000 MG: 500 TABLET ORAL at 20:29

## 2025-08-30 RX ADMIN — SODIUM CHLORIDE, PRESERVATIVE FREE 10 ML: 5 INJECTION INTRAVENOUS at 07:55

## 2025-08-30 RX ADMIN — OXYCODONE HYDROCHLORIDE 2.5 MG: 5 TABLET ORAL at 03:50

## 2025-08-30 RX ADMIN — SODIUM CHLORIDE: 0.9 INJECTION, SOLUTION INTRAVENOUS at 03:31

## 2025-08-30 RX ADMIN — BUPROPION HYDROCHLORIDE 150 MG: 75 TABLET, FILM COATED ORAL at 07:55

## 2025-08-30 RX ADMIN — OXYCODONE HYDROCHLORIDE 2.5 MG: 5 TABLET ORAL at 09:45

## 2025-08-30 RX ADMIN — METHOCARBAMOL 500 MG: 500 TABLET ORAL at 06:18

## 2025-08-30 RX ADMIN — OXYCODONE HYDROCHLORIDE 2.5 MG: 5 TABLET ORAL at 20:30

## 2025-08-30 RX ADMIN — DILTIAZEM HYDROCHLORIDE 30 MG: 30 TABLET, FILM COATED ORAL at 13:16

## 2025-08-30 RX ADMIN — POLYETHYLENE GLYCOL 3350 17 G: 17 POWDER, FOR SOLUTION ORAL at 09:46

## 2025-08-30 RX ADMIN — SODIUM CHLORIDE, PRESERVATIVE FREE 10 ML: 5 INJECTION INTRAVENOUS at 00:49

## 2025-08-30 RX ADMIN — ACETAMINOPHEN 1000 MG: 500 TABLET ORAL at 03:51

## 2025-08-30 RX ADMIN — PIPERACILLIN AND TAZOBACTAM 3375 MG: 3; .375 INJECTION, POWDER, LYOPHILIZED, FOR SOLUTION INTRAVENOUS at 11:09

## 2025-08-30 ASSESSMENT — PAIN SCALES - GENERAL
PAINLEVEL_OUTOF10: 10
PAINLEVEL_OUTOF10: 10
PAINLEVEL_OUTOF10: 8
PAINLEVEL_OUTOF10: 9
PAINLEVEL_OUTOF10: 10
PAINLEVEL_OUTOF10: 10
PAINLEVEL_OUTOF10: 6
PAINLEVEL_OUTOF10: 10
PAINLEVEL_OUTOF10: 9
PAINLEVEL_OUTOF10: 6
PAINLEVEL_OUTOF10: 6
PAINLEVEL_OUTOF10: 8
PAINLEVEL_OUTOF10: 8

## 2025-08-30 ASSESSMENT — PAIN DESCRIPTION - FREQUENCY
FREQUENCY: CONTINUOUS

## 2025-08-30 ASSESSMENT — PAIN DESCRIPTION - ORIENTATION
ORIENTATION: RIGHT;LEFT;ANTERIOR
ORIENTATION: RIGHT;LEFT

## 2025-08-30 ASSESSMENT — PAIN - FUNCTIONAL ASSESSMENT
PAIN_FUNCTIONAL_ASSESSMENT: 0-10
PAIN_FUNCTIONAL_ASSESSMENT: PREVENTS OR INTERFERES SOME ACTIVE ACTIVITIES AND ADLS
PAIN_FUNCTIONAL_ASSESSMENT: 0-10
PAIN_FUNCTIONAL_ASSESSMENT: PREVENTS OR INTERFERES SOME ACTIVE ACTIVITIES AND ADLS
PAIN_FUNCTIONAL_ASSESSMENT: PREVENTS OR INTERFERES SOME ACTIVE ACTIVITIES AND ADLS
PAIN_FUNCTIONAL_ASSESSMENT: 0-10
PAIN_FUNCTIONAL_ASSESSMENT: 0-10
PAIN_FUNCTIONAL_ASSESSMENT: PREVENTS OR INTERFERES SOME ACTIVE ACTIVITIES AND ADLS
PAIN_FUNCTIONAL_ASSESSMENT: 0-10
PAIN_FUNCTIONAL_ASSESSMENT: 0-10

## 2025-08-30 ASSESSMENT — PAIN DESCRIPTION - LOCATION
LOCATION: ABDOMEN

## 2025-08-30 ASSESSMENT — PAIN DESCRIPTION - DESCRIPTORS
DESCRIPTORS: SORE

## 2025-08-30 ASSESSMENT — PAIN DESCRIPTION - PAIN TYPE
TYPE: ACUTE PAIN;SURGICAL PAIN

## 2025-08-30 ASSESSMENT — PAIN DESCRIPTION - ONSET
ONSET: ON-GOING

## 2025-08-31 LAB
ANION GAP SERPL CALCULATED.3IONS-SCNC: 9 MMOL/L (ref 9–16)
BASOPHILS # BLD: <0.03 K/UL (ref 0–0.2)
BASOPHILS NFR BLD: 0 % (ref 0–2)
BUN SERPL-MCNC: 8 MG/DL (ref 8–23)
CALCIUM SERPL-MCNC: 8.5 MG/DL (ref 8.6–10.4)
CHLORIDE SERPL-SCNC: 100 MMOL/L (ref 98–107)
CO2 SERPL-SCNC: 29 MMOL/L (ref 20–31)
CREAT SERPL-MCNC: 0.6 MG/DL (ref 0.6–0.9)
EOSINOPHIL # BLD: 0.11 K/UL (ref 0–0.44)
EOSINOPHILS RELATIVE PERCENT: 2 % (ref 1–4)
ERYTHROCYTE [DISTWIDTH] IN BLOOD BY AUTOMATED COUNT: 13.9 % (ref 11.8–14.4)
GFR, ESTIMATED: >90 ML/MIN/1.73M2
GLUCOSE SERPL-MCNC: 106 MG/DL (ref 74–99)
HCT VFR BLD AUTO: 35.8 % (ref 36.3–47.1)
HGB BLD-MCNC: 11.5 G/DL (ref 11.9–15.1)
IMM GRANULOCYTES # BLD AUTO: 0.03 K/UL (ref 0–0.3)
IMM GRANULOCYTES NFR BLD: 0 %
LYMPHOCYTES NFR BLD: 1.48 K/UL (ref 1.1–3.7)
LYMPHOCYTES RELATIVE PERCENT: 20 % (ref 24–43)
MAGNESIUM SERPL-MCNC: 2.1 MG/DL (ref 1.6–2.4)
MCH RBC QN AUTO: 31 PG (ref 25.2–33.5)
MCHC RBC AUTO-ENTMCNC: 32.1 G/DL (ref 28.4–34.8)
MCV RBC AUTO: 96.5 FL (ref 82.6–102.9)
MONOCYTES NFR BLD: 0.81 K/UL (ref 0.1–1.2)
MONOCYTES NFR BLD: 11 % (ref 3–12)
NEUTROPHILS NFR BLD: 67 % (ref 36–65)
NEUTS SEG NFR BLD: 4.94 K/UL (ref 1.5–8.1)
NRBC BLD-RTO: 0 PER 100 WBC
PHOSPHATE SERPL-MCNC: 2.1 MG/DL (ref 2.5–4.5)
PLATELET # BLD AUTO: 226 K/UL (ref 138–453)
PMV BLD AUTO: 9.1 FL (ref 8.1–13.5)
POTASSIUM SERPL-SCNC: 3.5 MMOL/L (ref 3.7–5.3)
RBC # BLD AUTO: 3.71 M/UL (ref 3.95–5.11)
SODIUM SERPL-SCNC: 138 MMOL/L (ref 136–145)
WBC OTHER # BLD: 7.4 K/UL (ref 3.5–11.3)

## 2025-08-31 PROCEDURE — 6370000000 HC RX 637 (ALT 250 FOR IP)

## 2025-08-31 PROCEDURE — 94640 AIRWAY INHALATION TREATMENT: CPT

## 2025-08-31 PROCEDURE — 2580000003 HC RX 258: Performed by: PHYSICIAN ASSISTANT

## 2025-08-31 PROCEDURE — 99024 POSTOP FOLLOW-UP VISIT: CPT | Performed by: SURGERY

## 2025-08-31 PROCEDURE — 2500000003 HC RX 250 WO HCPCS

## 2025-08-31 PROCEDURE — 2580000003 HC RX 258

## 2025-08-31 PROCEDURE — 80048 BASIC METABOLIC PNL TOTAL CA: CPT

## 2025-08-31 PROCEDURE — 2500000003 HC RX 250 WO HCPCS: Performed by: PHYSICIAN ASSISTANT

## 2025-08-31 PROCEDURE — 83735 ASSAY OF MAGNESIUM: CPT

## 2025-08-31 PROCEDURE — 85025 COMPLETE CBC W/AUTO DIFF WBC: CPT

## 2025-08-31 PROCEDURE — 6360000002 HC RX W HCPCS

## 2025-08-31 PROCEDURE — 84100 ASSAY OF PHOSPHORUS: CPT

## 2025-08-31 PROCEDURE — 2700000000 HC OXYGEN THERAPY PER DAY

## 2025-08-31 PROCEDURE — 2060000000 HC ICU INTERMEDIATE R&B

## 2025-08-31 PROCEDURE — 6360000002 HC RX W HCPCS: Performed by: PHYSICIAN ASSISTANT

## 2025-08-31 PROCEDURE — 36415 COLL VENOUS BLD VENIPUNCTURE: CPT

## 2025-08-31 PROCEDURE — 6370000000 HC RX 637 (ALT 250 FOR IP): Performed by: PHYSICIAN ASSISTANT

## 2025-08-31 PROCEDURE — 94761 N-INVAS EAR/PLS OXIMETRY MLT: CPT

## 2025-08-31 RX ORDER — OXYCODONE HYDROCHLORIDE 5 MG/1
5 TABLET ORAL EVERY 6 HOURS PRN
Refills: 0 | Status: DISCONTINUED | OUTPATIENT
Start: 2025-08-31 | End: 2025-09-02 | Stop reason: HOSPADM

## 2025-08-31 RX ORDER — POTASSIUM CHLORIDE 7.45 MG/ML
10 INJECTION INTRAVENOUS
Status: COMPLETED | OUTPATIENT
Start: 2025-08-31 | End: 2025-08-31

## 2025-08-31 RX ADMIN — SODIUM CHLORIDE, PRESERVATIVE FREE 10 ML: 5 INJECTION INTRAVENOUS at 09:03

## 2025-08-31 RX ADMIN — ONDANSETRON 4 MG: 2 INJECTION, SOLUTION INTRAMUSCULAR; INTRAVENOUS at 14:55

## 2025-08-31 RX ADMIN — OXYCODONE HYDROCHLORIDE 2.5 MG: 5 TABLET ORAL at 03:48

## 2025-08-31 RX ADMIN — METHOCARBAMOL 500 MG: 500 TABLET ORAL at 11:33

## 2025-08-31 RX ADMIN — METHOCARBAMOL 500 MG: 500 TABLET ORAL at 16:39

## 2025-08-31 RX ADMIN — DILTIAZEM HYDROCHLORIDE 30 MG: 30 TABLET, FILM COATED ORAL at 11:33

## 2025-08-31 RX ADMIN — POTASSIUM CHLORIDE 10 MEQ: 7.46 INJECTION, SOLUTION INTRAVENOUS at 06:27

## 2025-08-31 RX ADMIN — BUMETANIDE 2 MG: 1 TABLET ORAL at 09:03

## 2025-08-31 RX ADMIN — BUMETANIDE 2 MG: 1 TABLET ORAL at 20:55

## 2025-08-31 RX ADMIN — DEXTROSE, SODIUM CHLORIDE, AND POTASSIUM CHLORIDE: 5; .45; .15 INJECTION INTRAVENOUS at 01:13

## 2025-08-31 RX ADMIN — HEPARIN SODIUM 5000 UNITS: 5000 INJECTION, SOLUTION INTRAVENOUS; SUBCUTANEOUS at 05:53

## 2025-08-31 RX ADMIN — ATORVASTATIN CALCIUM 40 MG: 40 TABLET, FILM COATED ORAL at 20:55

## 2025-08-31 RX ADMIN — PIPERACILLIN AND TAZOBACTAM 3375 MG: 3; .375 INJECTION, POWDER, LYOPHILIZED, FOR SOLUTION INTRAVENOUS at 11:00

## 2025-08-31 RX ADMIN — DILTIAZEM HYDROCHLORIDE 30 MG: 30 TABLET, FILM COATED ORAL at 09:03

## 2025-08-31 RX ADMIN — IPRATROPIUM BROMIDE AND ALBUTEROL SULFATE 1 DOSE: .5; 2.5 SOLUTION RESPIRATORY (INHALATION) at 08:20

## 2025-08-31 RX ADMIN — METHOCARBAMOL 500 MG: 500 TABLET ORAL at 23:51

## 2025-08-31 RX ADMIN — SPIRONOLACTONE 50 MG: 25 TABLET, FILM COATED ORAL at 09:03

## 2025-08-31 RX ADMIN — ACETAMINOPHEN 1000 MG: 500 TABLET ORAL at 14:04

## 2025-08-31 RX ADMIN — GABAPENTIN 100 MG: 100 CAPSULE ORAL at 20:55

## 2025-08-31 RX ADMIN — BUPROPION HYDROCHLORIDE 150 MG: 75 TABLET, FILM COATED ORAL at 09:03

## 2025-08-31 RX ADMIN — OXYCODONE HYDROCHLORIDE 5 MG: 5 TABLET ORAL at 17:22

## 2025-08-31 RX ADMIN — IPRATROPIUM BROMIDE AND ALBUTEROL SULFATE 1 DOSE: .5; 2.5 SOLUTION RESPIRATORY (INHALATION) at 21:02

## 2025-08-31 RX ADMIN — POLYETHYLENE GLYCOL 3350 17 G: 17 POWDER, FOR SOLUTION ORAL at 09:03

## 2025-08-31 RX ADMIN — POTASSIUM CHLORIDE 10 MEQ: 7.46 INJECTION, SOLUTION INTRAVENOUS at 08:28

## 2025-08-31 RX ADMIN — SODIUM PHOSPHATE, MONOBASIC, MONOHYDRATE AND SODIUM PHOSPHATE, DIBASIC, ANHYDROUS 15 MMOL: 276; 142 INJECTION, SOLUTION INTRAVENOUS at 06:41

## 2025-08-31 RX ADMIN — HEPARIN SODIUM 5000 UNITS: 5000 INJECTION, SOLUTION INTRAVENOUS; SUBCUTANEOUS at 21:00

## 2025-08-31 RX ADMIN — PIPERACILLIN AND TAZOBACTAM 3375 MG: 3; .375 INJECTION, POWDER, LYOPHILIZED, FOR SOLUTION INTRAVENOUS at 03:31

## 2025-08-31 RX ADMIN — DILTIAZEM HYDROCHLORIDE 30 MG: 30 TABLET, FILM COATED ORAL at 16:39

## 2025-08-31 RX ADMIN — OXYCODONE HYDROCHLORIDE 5 MG: 5 TABLET ORAL at 11:33

## 2025-08-31 RX ADMIN — TRAZODONE HYDROCHLORIDE 200 MG: 50 TABLET ORAL at 20:55

## 2025-08-31 RX ADMIN — IPRATROPIUM BROMIDE AND ALBUTEROL SULFATE 1 DOSE: .5; 2.5 SOLUTION RESPIRATORY (INHALATION) at 11:50

## 2025-08-31 RX ADMIN — PIPERACILLIN AND TAZOBACTAM 3375 MG: 3; .375 INJECTION, POWDER, LYOPHILIZED, FOR SOLUTION INTRAVENOUS at 17:49

## 2025-08-31 RX ADMIN — SODIUM CHLORIDE, PRESERVATIVE FREE 10 ML: 5 INJECTION INTRAVENOUS at 03:31

## 2025-08-31 RX ADMIN — METHOCARBAMOL 500 MG: 500 TABLET ORAL at 01:13

## 2025-08-31 RX ADMIN — SODIUM CHLORIDE, PRESERVATIVE FREE 10 ML: 5 INJECTION INTRAVENOUS at 09:04

## 2025-08-31 RX ADMIN — LEVOTHYROXINE SODIUM 200 MCG: 0.07 TABLET ORAL at 05:52

## 2025-08-31 RX ADMIN — GABAPENTIN 100 MG: 100 CAPSULE ORAL at 05:52

## 2025-08-31 RX ADMIN — ACETAMINOPHEN 1000 MG: 500 TABLET ORAL at 20:55

## 2025-08-31 RX ADMIN — ACETAMINOPHEN 1000 MG: 500 TABLET ORAL at 05:52

## 2025-08-31 RX ADMIN — DILTIAZEM HYDROCHLORIDE 30 MG: 30 TABLET, FILM COATED ORAL at 20:55

## 2025-08-31 RX ADMIN — BUPROPION HYDROCHLORIDE 150 MG: 75 TABLET, FILM COATED ORAL at 20:55

## 2025-08-31 RX ADMIN — GABAPENTIN 100 MG: 100 CAPSULE ORAL at 14:04

## 2025-08-31 RX ADMIN — HEPARIN SODIUM 5000 UNITS: 5000 INJECTION, SOLUTION INTRAVENOUS; SUBCUTANEOUS at 14:05

## 2025-08-31 RX ADMIN — POTASSIUM CHLORIDE 10 MEQ: 7.46 INJECTION, SOLUTION INTRAVENOUS at 07:28

## 2025-08-31 RX ADMIN — METHOCARBAMOL 500 MG: 500 TABLET ORAL at 05:52

## 2025-08-31 RX ADMIN — SODIUM CHLORIDE, PRESERVATIVE FREE 10 ML: 5 INJECTION INTRAVENOUS at 21:01

## 2025-08-31 RX ADMIN — POTASSIUM CHLORIDE 10 MEQ: 7.46 INJECTION, SOLUTION INTRAVENOUS at 05:41

## 2025-08-31 ASSESSMENT — PAIN DESCRIPTION - DESCRIPTORS
DESCRIPTORS: SORE

## 2025-08-31 ASSESSMENT — PAIN - FUNCTIONAL ASSESSMENT
PAIN_FUNCTIONAL_ASSESSMENT: PREVENTS OR INTERFERES SOME ACTIVE ACTIVITIES AND ADLS
PAIN_FUNCTIONAL_ASSESSMENT: 0-10
PAIN_FUNCTIONAL_ASSESSMENT: 0-10
PAIN_FUNCTIONAL_ASSESSMENT: PREVENTS OR INTERFERES SOME ACTIVE ACTIVITIES AND ADLS
PAIN_FUNCTIONAL_ASSESSMENT: 0-10
PAIN_FUNCTIONAL_ASSESSMENT: 0-10
PAIN_FUNCTIONAL_ASSESSMENT: PREVENTS OR INTERFERES SOME ACTIVE ACTIVITIES AND ADLS
PAIN_FUNCTIONAL_ASSESSMENT: 0-10
PAIN_FUNCTIONAL_ASSESSMENT: 0-10

## 2025-08-31 ASSESSMENT — PAIN SCALES - GENERAL
PAINLEVEL_OUTOF10: 8
PAINLEVEL_OUTOF10: 8
PAINLEVEL_OUTOF10: 9
PAINLEVEL_OUTOF10: 9
PAINLEVEL_OUTOF10: 6
PAINLEVEL_OUTOF10: 8
PAINLEVEL_OUTOF10: 6
PAINLEVEL_OUTOF10: 7
PAINLEVEL_OUTOF10: 6
PAINLEVEL_OUTOF10: 8

## 2025-08-31 ASSESSMENT — PAIN DESCRIPTION - ONSET
ONSET: ON-GOING

## 2025-08-31 ASSESSMENT — PAIN DESCRIPTION - ORIENTATION
ORIENTATION: RIGHT;LEFT;ANTERIOR

## 2025-08-31 ASSESSMENT — PAIN DESCRIPTION - PAIN TYPE
TYPE: ACUTE PAIN;SURGICAL PAIN

## 2025-08-31 ASSESSMENT — PAIN DESCRIPTION - LOCATION
LOCATION: ABDOMEN

## 2025-08-31 ASSESSMENT — PAIN DESCRIPTION - FREQUENCY
FREQUENCY: CONTINUOUS

## 2025-09-01 LAB
ANION GAP SERPL CALCULATED.3IONS-SCNC: 8 MMOL/L (ref 9–16)
BASOPHILS # BLD: 0.03 K/UL (ref 0–0.2)
BASOPHILS NFR BLD: 1 % (ref 0–2)
BUN SERPL-MCNC: 6 MG/DL (ref 8–23)
CALCIUM SERPL-MCNC: 9 MG/DL (ref 8.6–10.4)
CHLORIDE SERPL-SCNC: 99 MMOL/L (ref 98–107)
CO2 SERPL-SCNC: 31 MMOL/L (ref 20–31)
CREAT SERPL-MCNC: 0.6 MG/DL (ref 0.6–0.9)
EOSINOPHIL # BLD: 0.14 K/UL (ref 0–0.44)
EOSINOPHILS RELATIVE PERCENT: 2 % (ref 1–4)
ERYTHROCYTE [DISTWIDTH] IN BLOOD BY AUTOMATED COUNT: 14 % (ref 11.8–14.4)
GFR, ESTIMATED: >90 ML/MIN/1.73M2
GLUCOSE SERPL-MCNC: 97 MG/DL (ref 74–99)
HCT VFR BLD AUTO: 34.1 % (ref 36.3–47.1)
HGB BLD-MCNC: 11 G/DL (ref 11.9–15.1)
IMM GRANULOCYTES # BLD AUTO: <0.03 K/UL (ref 0–0.3)
IMM GRANULOCYTES NFR BLD: 0 %
LYMPHOCYTES NFR BLD: 1.12 K/UL (ref 1.1–3.7)
LYMPHOCYTES RELATIVE PERCENT: 20 % (ref 24–43)
MAGNESIUM SERPL-MCNC: 1.8 MG/DL (ref 1.6–2.4)
MCH RBC QN AUTO: 31 PG (ref 25.2–33.5)
MCHC RBC AUTO-ENTMCNC: 32.3 G/DL (ref 28.4–34.8)
MCV RBC AUTO: 96.1 FL (ref 82.6–102.9)
MONOCYTES NFR BLD: 0.43 K/UL (ref 0.1–1.2)
MONOCYTES NFR BLD: 8 % (ref 3–12)
NEUTROPHILS NFR BLD: 69 % (ref 36–65)
NEUTS SEG NFR BLD: 4.01 K/UL (ref 1.5–8.1)
NRBC BLD-RTO: 0 PER 100 WBC
PHOSPHATE SERPL-MCNC: 2.8 MG/DL (ref 2.5–4.5)
PLATELET # BLD AUTO: 241 K/UL (ref 138–453)
PMV BLD AUTO: 9.2 FL (ref 8.1–13.5)
POTASSIUM SERPL-SCNC: 3.5 MMOL/L (ref 3.7–5.3)
RBC # BLD AUTO: 3.55 M/UL (ref 3.95–5.11)
SODIUM SERPL-SCNC: 138 MMOL/L (ref 136–145)
WBC OTHER # BLD: 5.8 K/UL (ref 3.5–11.3)

## 2025-09-01 PROCEDURE — 2580000003 HC RX 258

## 2025-09-01 PROCEDURE — 2500000003 HC RX 250 WO HCPCS: Performed by: PHYSICIAN ASSISTANT

## 2025-09-01 PROCEDURE — 6360000002 HC RX W HCPCS

## 2025-09-01 PROCEDURE — 84100 ASSAY OF PHOSPHORUS: CPT

## 2025-09-01 PROCEDURE — 6370000000 HC RX 637 (ALT 250 FOR IP)

## 2025-09-01 PROCEDURE — 80048 BASIC METABOLIC PNL TOTAL CA: CPT

## 2025-09-01 PROCEDURE — 2500000003 HC RX 250 WO HCPCS

## 2025-09-01 PROCEDURE — 85025 COMPLETE CBC W/AUTO DIFF WBC: CPT

## 2025-09-01 PROCEDURE — 94640 AIRWAY INHALATION TREATMENT: CPT

## 2025-09-01 PROCEDURE — 6360000002 HC RX W HCPCS: Performed by: PHYSICIAN ASSISTANT

## 2025-09-01 PROCEDURE — 2700000000 HC OXYGEN THERAPY PER DAY

## 2025-09-01 PROCEDURE — 99024 POSTOP FOLLOW-UP VISIT: CPT | Performed by: SURGERY

## 2025-09-01 PROCEDURE — 6370000000 HC RX 637 (ALT 250 FOR IP): Performed by: PHYSICIAN ASSISTANT

## 2025-09-01 PROCEDURE — 94761 N-INVAS EAR/PLS OXIMETRY MLT: CPT

## 2025-09-01 PROCEDURE — 83735 ASSAY OF MAGNESIUM: CPT

## 2025-09-01 PROCEDURE — 2060000000 HC ICU INTERMEDIATE R&B

## 2025-09-01 PROCEDURE — 2580000003 HC RX 258: Performed by: PHYSICIAN ASSISTANT

## 2025-09-01 PROCEDURE — 36415 COLL VENOUS BLD VENIPUNCTURE: CPT

## 2025-09-01 RX ORDER — IPRATROPIUM BROMIDE AND ALBUTEROL SULFATE 2.5; .5 MG/3ML; MG/3ML
1 SOLUTION RESPIRATORY (INHALATION) EVERY 4 HOURS PRN
Status: DISCONTINUED | OUTPATIENT
Start: 2025-09-01 | End: 2025-09-02 | Stop reason: HOSPADM

## 2025-09-01 RX ADMIN — PIPERACILLIN AND TAZOBACTAM 3375 MG: 3; .375 INJECTION, POWDER, LYOPHILIZED, FOR SOLUTION INTRAVENOUS at 11:14

## 2025-09-01 RX ADMIN — LEVOTHYROXINE SODIUM 200 MCG: 0.07 TABLET ORAL at 05:27

## 2025-09-01 RX ADMIN — IPRATROPIUM BROMIDE AND ALBUTEROL SULFATE 1 DOSE: .5; 2.5 SOLUTION RESPIRATORY (INHALATION) at 08:04

## 2025-09-01 RX ADMIN — OXYCODONE HYDROCHLORIDE 5 MG: 5 TABLET ORAL at 00:59

## 2025-09-01 RX ADMIN — TRAZODONE HYDROCHLORIDE 200 MG: 50 TABLET ORAL at 21:53

## 2025-09-01 RX ADMIN — HEPARIN SODIUM 5000 UNITS: 5000 INJECTION, SOLUTION INTRAVENOUS; SUBCUTANEOUS at 05:27

## 2025-09-01 RX ADMIN — SPIRONOLACTONE 50 MG: 25 TABLET, FILM COATED ORAL at 07:59

## 2025-09-01 RX ADMIN — BUPROPION HYDROCHLORIDE 150 MG: 75 TABLET, FILM COATED ORAL at 19:30

## 2025-09-01 RX ADMIN — SODIUM CHLORIDE, PRESERVATIVE FREE 10 ML: 5 INJECTION INTRAVENOUS at 08:00

## 2025-09-01 RX ADMIN — BUPROPION HYDROCHLORIDE 150 MG: 75 TABLET, FILM COATED ORAL at 07:58

## 2025-09-01 RX ADMIN — IPRATROPIUM BROMIDE AND ALBUTEROL SULFATE 1 DOSE: .5; 2.5 SOLUTION RESPIRATORY (INHALATION) at 11:40

## 2025-09-01 RX ADMIN — BUMETANIDE 2 MG: 1 TABLET ORAL at 19:31

## 2025-09-01 RX ADMIN — OXYCODONE HYDROCHLORIDE 5 MG: 5 TABLET ORAL at 13:09

## 2025-09-01 RX ADMIN — BUMETANIDE 2 MG: 1 TABLET ORAL at 07:59

## 2025-09-01 RX ADMIN — SODIUM CHLORIDE, PRESERVATIVE FREE 10 ML: 5 INJECTION INTRAVENOUS at 19:31

## 2025-09-01 RX ADMIN — METHOCARBAMOL 500 MG: 500 TABLET ORAL at 16:45

## 2025-09-01 RX ADMIN — GABAPENTIN 100 MG: 100 CAPSULE ORAL at 19:33

## 2025-09-01 RX ADMIN — IPRATROPIUM BROMIDE AND ALBUTEROL SULFATE 1 DOSE: .5; 2.5 SOLUTION RESPIRATORY (INHALATION) at 15:11

## 2025-09-01 RX ADMIN — DILTIAZEM HYDROCHLORIDE 30 MG: 30 TABLET, FILM COATED ORAL at 16:45

## 2025-09-01 RX ADMIN — GABAPENTIN 100 MG: 100 CAPSULE ORAL at 05:26

## 2025-09-01 RX ADMIN — DILTIAZEM HYDROCHLORIDE 30 MG: 30 TABLET, FILM COATED ORAL at 13:10

## 2025-09-01 RX ADMIN — METHOCARBAMOL 500 MG: 500 TABLET ORAL at 23:12

## 2025-09-01 RX ADMIN — SODIUM PHOSPHATE, MONOBASIC, MONOHYDRATE AND SODIUM PHOSPHATE, DIBASIC, ANHYDROUS 15 MMOL: 276; 142 INJECTION, SOLUTION INTRAVENOUS at 08:03

## 2025-09-01 RX ADMIN — DILTIAZEM HYDROCHLORIDE 30 MG: 30 TABLET, FILM COATED ORAL at 07:58

## 2025-09-01 RX ADMIN — DEXTROSE, SODIUM CHLORIDE, AND POTASSIUM CHLORIDE: 5; .45; .15 INJECTION INTRAVENOUS at 00:55

## 2025-09-01 RX ADMIN — ACETAMINOPHEN 1000 MG: 500 TABLET ORAL at 13:09

## 2025-09-01 RX ADMIN — METHOCARBAMOL 500 MG: 500 TABLET ORAL at 05:27

## 2025-09-01 RX ADMIN — ATORVASTATIN CALCIUM 40 MG: 40 TABLET, FILM COATED ORAL at 19:31

## 2025-09-01 RX ADMIN — OXYCODONE HYDROCHLORIDE 5 MG: 5 TABLET ORAL at 07:01

## 2025-09-01 RX ADMIN — DILTIAZEM HYDROCHLORIDE 30 MG: 30 TABLET, FILM COATED ORAL at 19:31

## 2025-09-01 RX ADMIN — PIPERACILLIN AND TAZOBACTAM 3375 MG: 3; .375 INJECTION, POWDER, LYOPHILIZED, FOR SOLUTION INTRAVENOUS at 02:58

## 2025-09-01 RX ADMIN — METHOCARBAMOL 500 MG: 500 TABLET ORAL at 11:13

## 2025-09-01 RX ADMIN — GABAPENTIN 100 MG: 100 CAPSULE ORAL at 13:10

## 2025-09-01 RX ADMIN — RIVAROXABAN 20 MG: 20 TABLET, FILM COATED ORAL at 16:45

## 2025-09-01 RX ADMIN — ACETAMINOPHEN 1000 MG: 500 TABLET ORAL at 19:30

## 2025-09-01 RX ADMIN — OXYCODONE HYDROCHLORIDE 5 MG: 5 TABLET ORAL at 19:30

## 2025-09-01 RX ADMIN — DULOXETINE 60 MG: 30 CAPSULE, DELAYED RELEASE ORAL at 07:58

## 2025-09-01 RX ADMIN — ACETAMINOPHEN 1000 MG: 500 TABLET ORAL at 05:26

## 2025-09-01 RX ADMIN — SODIUM CHLORIDE: 0.9 INJECTION, SOLUTION INTRAVENOUS at 02:56

## 2025-09-01 RX ADMIN — SODIUM CHLORIDE, PRESERVATIVE FREE 10 ML: 5 INJECTION INTRAVENOUS at 07:59

## 2025-09-01 RX ADMIN — POLYETHYLENE GLYCOL 3350 17 G: 17 POWDER, FOR SOLUTION ORAL at 07:58

## 2025-09-01 ASSESSMENT — PAIN DESCRIPTION - PAIN TYPE
TYPE: ACUTE PAIN;SURGICAL PAIN
TYPE: ACUTE PAIN;SURGICAL PAIN

## 2025-09-01 ASSESSMENT — PAIN DESCRIPTION - LOCATION
LOCATION: ABDOMEN

## 2025-09-01 ASSESSMENT — PAIN DESCRIPTION - ORIENTATION
ORIENTATION: ANTERIOR
ORIENTATION: RIGHT;LEFT;ANTERIOR
ORIENTATION: RIGHT;LEFT;LOWER
ORIENTATION: MID
ORIENTATION: RIGHT;LEFT;ANTERIOR

## 2025-09-01 ASSESSMENT — PAIN - FUNCTIONAL ASSESSMENT
PAIN_FUNCTIONAL_ASSESSMENT: PREVENTS OR INTERFERES SOME ACTIVE ACTIVITIES AND ADLS
PAIN_FUNCTIONAL_ASSESSMENT: ACTIVITIES ARE NOT PREVENTED
PAIN_FUNCTIONAL_ASSESSMENT: 0-10
PAIN_FUNCTIONAL_ASSESSMENT: PREVENTS OR INTERFERES SOME ACTIVE ACTIVITIES AND ADLS
PAIN_FUNCTIONAL_ASSESSMENT: 0-10
PAIN_FUNCTIONAL_ASSESSMENT: 0-10

## 2025-09-01 ASSESSMENT — PAIN DESCRIPTION - DESCRIPTORS
DESCRIPTORS: SORE
DESCRIPTORS: ACHING
DESCRIPTORS: SORE
DESCRIPTORS: SORE;DISCOMFORT

## 2025-09-01 ASSESSMENT — PAIN SCALES - GENERAL
PAINLEVEL_OUTOF10: 7
PAINLEVEL_OUTOF10: 0
PAINLEVEL_OUTOF10: 8
PAINLEVEL_OUTOF10: 8
PAINLEVEL_OUTOF10: 6
PAINLEVEL_OUTOF10: 7
PAINLEVEL_OUTOF10: 8
PAINLEVEL_OUTOF10: 6
PAINLEVEL_OUTOF10: 0
PAINLEVEL_OUTOF10: 7
PAINLEVEL_OUTOF10: 8
PAINLEVEL_OUTOF10: 0
PAINLEVEL_OUTOF10: 6
PAINLEVEL_OUTOF10: 7

## 2025-09-01 ASSESSMENT — PAIN DESCRIPTION - FREQUENCY
FREQUENCY: CONTINUOUS
FREQUENCY: CONTINUOUS

## 2025-09-01 ASSESSMENT — PAIN DESCRIPTION - ONSET
ONSET: ON-GOING
ONSET: ON-GOING

## 2025-09-02 VITALS
WEIGHT: 137 LBS | DIASTOLIC BLOOD PRESSURE: 67 MMHG | TEMPERATURE: 97.9 F | HEART RATE: 90 BPM | RESPIRATION RATE: 16 BRPM | SYSTOLIC BLOOD PRESSURE: 129 MMHG | HEIGHT: 65 IN | OXYGEN SATURATION: 100 % | BODY MASS INDEX: 22.82 KG/M2

## 2025-09-02 LAB
ANION GAP SERPL CALCULATED.3IONS-SCNC: 10 MMOL/L (ref 9–16)
BASOPHILS # BLD: 0.03 K/UL (ref 0–0.2)
BASOPHILS NFR BLD: 0 % (ref 0–2)
BUN SERPL-MCNC: 7 MG/DL (ref 8–23)
CALCIUM SERPL-MCNC: 9.8 MG/DL (ref 8.6–10.4)
CHLORIDE SERPL-SCNC: 96 MMOL/L (ref 98–107)
CO2 SERPL-SCNC: 33 MMOL/L (ref 20–31)
CREAT SERPL-MCNC: 0.6 MG/DL (ref 0.6–0.9)
EOSINOPHIL # BLD: 0.14 K/UL (ref 0–0.44)
EOSINOPHILS RELATIVE PERCENT: 2 % (ref 1–4)
ERYTHROCYTE [DISTWIDTH] IN BLOOD BY AUTOMATED COUNT: 13.9 % (ref 11.8–14.4)
GFR, ESTIMATED: >90 ML/MIN/1.73M2
GLUCOSE SERPL-MCNC: 86 MG/DL (ref 74–99)
HCT VFR BLD AUTO: 36.3 % (ref 36.3–47.1)
HGB BLD-MCNC: 12.3 G/DL (ref 11.9–15.1)
IMM GRANULOCYTES # BLD AUTO: 0.03 K/UL (ref 0–0.3)
IMM GRANULOCYTES NFR BLD: 0 %
LYMPHOCYTES NFR BLD: 1.75 K/UL (ref 1.1–3.7)
LYMPHOCYTES RELATIVE PERCENT: 24 % (ref 24–43)
MAGNESIUM SERPL-MCNC: 1.8 MG/DL (ref 1.6–2.4)
MCH RBC QN AUTO: 31.6 PG (ref 25.2–33.5)
MCHC RBC AUTO-ENTMCNC: 33.9 G/DL (ref 28.4–34.8)
MCV RBC AUTO: 93.3 FL (ref 82.6–102.9)
MONOCYTES NFR BLD: 0.81 K/UL (ref 0.1–1.2)
MONOCYTES NFR BLD: 11 % (ref 3–12)
NEUTROPHILS NFR BLD: 63 % (ref 36–65)
NEUTS SEG NFR BLD: 4.4 K/UL (ref 1.5–8.1)
NRBC BLD-RTO: 0 PER 100 WBC
PHOSPHATE SERPL-MCNC: 3.8 MG/DL (ref 2.5–4.5)
PLATELET # BLD AUTO: 240 K/UL (ref 138–453)
PMV BLD AUTO: 9.2 FL (ref 8.1–13.5)
POTASSIUM SERPL-SCNC: 3.7 MMOL/L (ref 3.7–5.3)
RBC # BLD AUTO: 3.89 M/UL (ref 3.95–5.11)
SODIUM SERPL-SCNC: 139 MMOL/L (ref 136–145)
SURGICAL PATHOLOGY REPORT: NORMAL
WBC OTHER # BLD: 7.2 K/UL (ref 3.5–11.3)

## 2025-09-02 PROCEDURE — 80048 BASIC METABOLIC PNL TOTAL CA: CPT

## 2025-09-02 PROCEDURE — 36415 COLL VENOUS BLD VENIPUNCTURE: CPT

## 2025-09-02 PROCEDURE — 6370000000 HC RX 637 (ALT 250 FOR IP)

## 2025-09-02 PROCEDURE — 99024 POSTOP FOLLOW-UP VISIT: CPT | Performed by: SURGERY

## 2025-09-02 PROCEDURE — 99213 OFFICE O/P EST LOW 20 MIN: CPT

## 2025-09-02 PROCEDURE — 85025 COMPLETE CBC W/AUTO DIFF WBC: CPT

## 2025-09-02 PROCEDURE — 2500000003 HC RX 250 WO HCPCS

## 2025-09-02 PROCEDURE — 94761 N-INVAS EAR/PLS OXIMETRY MLT: CPT

## 2025-09-02 PROCEDURE — 6360000002 HC RX W HCPCS

## 2025-09-02 PROCEDURE — 84100 ASSAY OF PHOSPHORUS: CPT

## 2025-09-02 PROCEDURE — 83735 ASSAY OF MAGNESIUM: CPT

## 2025-09-02 RX ORDER — OXYCODONE HYDROCHLORIDE 5 MG/1
5 TABLET ORAL EVERY 6 HOURS PRN
Qty: 28 TABLET | Refills: 0 | Status: SHIPPED | OUTPATIENT
Start: 2025-09-02 | End: 2025-09-09

## 2025-09-02 RX ORDER — MAGNESIUM SULFATE IN WATER 40 MG/ML
2000 INJECTION, SOLUTION INTRAVENOUS ONCE
Status: COMPLETED | OUTPATIENT
Start: 2025-09-02 | End: 2025-09-02

## 2025-09-02 RX ADMIN — OXYCODONE HYDROCHLORIDE 5 MG: 5 TABLET ORAL at 15:41

## 2025-09-02 RX ADMIN — BUPROPION HYDROCHLORIDE 150 MG: 75 TABLET, FILM COATED ORAL at 08:45

## 2025-09-02 RX ADMIN — ACETAMINOPHEN 1000 MG: 500 TABLET ORAL at 06:08

## 2025-09-02 RX ADMIN — DILTIAZEM HYDROCHLORIDE 30 MG: 30 TABLET, FILM COATED ORAL at 11:54

## 2025-09-02 RX ADMIN — SODIUM CHLORIDE, PRESERVATIVE FREE 10 ML: 5 INJECTION INTRAVENOUS at 07:50

## 2025-09-02 RX ADMIN — LEVOTHYROXINE SODIUM 200 MCG: 0.07 TABLET ORAL at 06:07

## 2025-09-02 RX ADMIN — METHOCARBAMOL 500 MG: 500 TABLET ORAL at 06:08

## 2025-09-02 RX ADMIN — POLYETHYLENE GLYCOL 3350 17 G: 17 POWDER, FOR SOLUTION ORAL at 07:50

## 2025-09-02 RX ADMIN — DULOXETINE 60 MG: 30 CAPSULE, DELAYED RELEASE ORAL at 08:43

## 2025-09-02 RX ADMIN — BUMETANIDE 2 MG: 1 TABLET ORAL at 08:45

## 2025-09-02 RX ADMIN — OXYCODONE HYDROCHLORIDE 5 MG: 5 TABLET ORAL at 03:04

## 2025-09-02 RX ADMIN — ACETAMINOPHEN 1000 MG: 500 TABLET ORAL at 13:36

## 2025-09-02 RX ADMIN — SPIRONOLACTONE 50 MG: 25 TABLET, FILM COATED ORAL at 08:43

## 2025-09-02 RX ADMIN — MAGNESIUM SULFATE HEPTAHYDRATE 2000 MG: 40 INJECTION, SOLUTION INTRAVENOUS at 07:52

## 2025-09-02 RX ADMIN — POTASSIUM BICARBONATE 40 MEQ: 782 TABLET, EFFERVESCENT ORAL at 07:50

## 2025-09-02 RX ADMIN — GABAPENTIN 100 MG: 100 CAPSULE ORAL at 06:08

## 2025-09-02 RX ADMIN — DILTIAZEM HYDROCHLORIDE 30 MG: 30 TABLET, FILM COATED ORAL at 08:43

## 2025-09-02 RX ADMIN — GABAPENTIN 100 MG: 100 CAPSULE ORAL at 13:36

## 2025-09-02 RX ADMIN — METHOCARBAMOL 500 MG: 500 TABLET ORAL at 11:54

## 2025-09-02 ASSESSMENT — PAIN SCALES - GENERAL
PAINLEVEL_OUTOF10: 8
PAINLEVEL_OUTOF10: 8
PAINLEVEL_OUTOF10: 0
PAINLEVEL_OUTOF10: 8
PAINLEVEL_OUTOF10: 9
PAINLEVEL_OUTOF10: 8
PAINLEVEL_OUTOF10: 3
PAINLEVEL_OUTOF10: 7

## 2025-09-02 ASSESSMENT — PAIN DESCRIPTION - ORIENTATION
ORIENTATION: ANTERIOR
ORIENTATION: ANTERIOR
ORIENTATION: MID;LEFT;RIGHT;UPPER;LOWER
ORIENTATION: ANTERIOR
ORIENTATION: ANTERIOR

## 2025-09-02 ASSESSMENT — PAIN DESCRIPTION - DESCRIPTORS
DESCRIPTORS: SORE;DISCOMFORT
DESCRIPTORS: ACHING;DISCOMFORT
DESCRIPTORS: ACHING;DISCOMFORT
DESCRIPTORS: SORE;DISCOMFORT
DESCRIPTORS: SORE;DISCOMFORT
DESCRIPTORS: ACHING;DISCOMFORT

## 2025-09-02 ASSESSMENT — PAIN - FUNCTIONAL ASSESSMENT
PAIN_FUNCTIONAL_ASSESSMENT: 0-10

## 2025-09-02 ASSESSMENT — PAIN DESCRIPTION - LOCATION
LOCATION: ABDOMEN

## 2025-09-03 ENCOUNTER — CARE COORDINATION (OUTPATIENT)
Dept: CARE COORDINATION | Age: 80
End: 2025-09-03

## 2025-09-04 ENCOUNTER — TELEPHONE (OUTPATIENT)
Age: 80
End: 2025-09-04

## (undated) DEVICE — STRAP ARMBRD W1.5XL32IN FOAM STR YET SFT W/ HK AND LOOP

## (undated) DEVICE — Device

## (undated) DEVICE — GLOVE SURG SZ 75 L12IN FNGR THK87MIL DK GRN LTX FREE ISOLEX

## (undated) DEVICE — GOWN,SIRUS,POLYRNF,BRTHSLV,XL,30/CS: Brand: MEDLINE

## (undated) DEVICE — SOLUTION PREP POVIDONE IOD FOR SKIN MUCOUS MEM PRIOR TO

## (undated) DEVICE — SPONGE LAP W18XL18IN WHT COT 4 PLY FLD STRUNG RADPQ DISP ST

## (undated) DEVICE — MITT SURG PREP L ADH DISPOSABLE

## (undated) DEVICE — INSTRUMENT REPROC SEAL/DIVIDE OPEN LIGASURE IMPACT CRV LG JAW NANO-COAT 18CM

## (undated) DEVICE — 450 ML BOTTLE OF 0.05% CHLORHEXIDINE GLUCONATE IN 99.95% STERILE WATER FOR IRRIGATION, USP AND APPLICATOR.: Brand: IRRISEPT ANTIMICROBIAL WOUND LAVAGE

## (undated) DEVICE — SUTURE ABSORBABLE MONOFILAMENT 1 CTX 36 CM 48 MM VIO PDS +

## (undated) DEVICE — PAD,NON-ADHERENT,3X8,STERILE,LF,1/PK: Brand: MEDLINE

## (undated) DEVICE — APPLICATOR MEDICATED 10.5 CC SOLUTION HI LT ORNG CHLORAPREP

## (undated) DEVICE — CONNECTOR,TUBING,5-IN-1,NON-STERILE: Brand: MEDLINE INDUSTRIES, INC.

## (undated) DEVICE — 14FR COLON DECOMPRESSION SET: Brand: COOK

## (undated) DEVICE — TUBING, SUCTION, 9/32" X 20', STRAIGHT: Brand: MEDLINE INDUSTRIES, INC.

## (undated) DEVICE — HOOD: Brand: FLYTE, SURGICOOL

## (undated) DEVICE — TUBING, SUCTION, 9/32" X 12', STRAIGHT: Brand: MEDLINE INDUSTRIES, INC.

## (undated) DEVICE — SYRINGE IRRIG 60ML SFT PLIABLE BLB EZ TO GRP 1 HND USE W/

## (undated) DEVICE — TOWEL SURG W17XL27IN NAT COT DISP ST 80 PER CA

## (undated) DEVICE — DRAPE SURG W48XL52IN POLY U FEN REINF ADV ADH MATTE FINISH

## (undated) DEVICE — SLING ARM SM W75XL138IN POLY COT DP L PKT DBL D RNG HK

## (undated) DEVICE — NEEDLE, QUINCKE, 22GX3.5": Brand: MEDLINE

## (undated) DEVICE — STRAP,POSITIONING,KNEE/BODY,FOAM,4X60": Brand: MEDLINE

## (undated) DEVICE — SUTURE PDS II SZ 1 L36IN ABSRB VLT L36MM CT-1 1/2 CIR Z347H

## (undated) DEVICE — DRAPE C ARM CLP FOR GE 9600 9800

## (undated) DEVICE — SOLUTION IRRIG 1000ML 0.9% SOD CHL USP POUR PLAS BTL

## (undated) DEVICE — SPONGE LAP W18XL18IN WHT COT 4 PLY FLD STRUNG RADPQ DISP ST 2 PER PACK

## (undated) DEVICE — GLOVE ORANGE PI 7 1/2   MSG9075

## (undated) DEVICE — GLOVE SURG SZ 6 THK91MIL LTX FREE SYN POLYISOPRENE ANTI

## (undated) DEVICE — STANDARD HYPODERMIC NEEDLE,ALUMINUM HUB: Brand: MONOJECT

## (undated) DEVICE — KIT DRN FLAT W/ 100CC EVAC 7MM FULL PERF

## (undated) DEVICE — STRAP POS FOAM SFT STR FOR KNEE BODY

## (undated) DEVICE — BLADE CLP TAPR HD WET DRY CAPABILITY GTT IN CHARGING USE

## (undated) DEVICE — DRESSING BORDERED ADH GZ UNIV GEN USE 8INX4IN AND 6INX2IN

## (undated) DEVICE — GOWN,SIRUS,NONRNF,SETINSLV,2XL,18/CS: Brand: MEDLINE

## (undated) DEVICE — 1LYRTR 16FR10ML100%SIL UMS SNP: Brand: MEDLINE INDUSTRIES, INC.

## (undated) DEVICE — THREE-QUARTER SHEET: Brand: CONVERTORS

## (undated) DEVICE — COVER OR TBL W40XL90IN ABSRB STD AND GRIPPY BK SAHARA

## (undated) DEVICE — YANKAUER,FLEXIBLE HANDLE,REGLR CAPACITY: Brand: MEDLINE INDUSTRIES, INC.

## (undated) DEVICE — TOWEL,OR,DSP,ST,NATURAL,DLX,4/PK,20PK/CS: Brand: MEDLINE

## (undated) DEVICE — PADDING,UNDERCAST,COTTON, 3X4YD STERILE: Brand: MEDLINE

## (undated) DEVICE — SYRINGE MED 10ML LUERLOCK TIP W/O SFTY DISP

## (undated) DEVICE — SPONGE,NEURO,0.5"X3",XR,STRL,LF,10/PK: Brand: MEDLINE

## (undated) DEVICE — PADDING CAST W6INXL4YD COT LO LINTING WYTEX

## (undated) DEVICE — SUTURE VICRYL SZ 0 L18IN ABSRB VLT L36MM CT-1 1/2 CIR J740D

## (undated) DEVICE — NEEDLE HYPO 18GA L1IN PNK POLYPR HUB S STL REG BVL STR W/O

## (undated) DEVICE — SINGLE-USE POLYPECTOMY SNARE: Brand: CAPTIFLEX

## (undated) DEVICE — SOLUTION WND IRRIGATION 450 ML 0.5 PVP-I 0.9 NACL

## (undated) DEVICE — SYRINGE, LUER LOCK, 10ML: Brand: MEDLINE

## (undated) DEVICE — Z DISCONTINUED BY MEDLINE USE 2711682 TRAY SKIN PREP DRY W/ PREM GLV

## (undated) DEVICE — SOLUTION IV 50ML 0.9% SOD CHL PLAS CONT USP VIAFLX

## (undated) DEVICE — STERILE POLYISOPRENE POWDER-FREE SURGICAL GLOVES WITH EMOLLIENT COATING: Brand: PROTEXIS

## (undated) DEVICE — STAPLER INT L75MM CUT LN L73MM STPL LN L77MM BLU B FRM 8

## (undated) DEVICE — CUFF TOURNIQUET 18 SNG BLADDER DUAL PORT

## (undated) DEVICE — DRAPE C ARM W/ POLY STRP W42XL72IN FOR MOB XR

## (undated) DEVICE — SUTURE VCRL SZ 2-0 L18IN ABSRB VLT L26MM SH 1/2 CIR J775D

## (undated) DEVICE — CONNECTOR TBNG WHT PLAS SUCT STR 5IN1 LTWT W/ M CONN

## (undated) DEVICE — 3M™ IOBAN™ 2 ANTIMICROBIAL INCISE DRAPE 6650EZ: Brand: IOBAN™ 2

## (undated) DEVICE — GOWN SURG XL L47IN BLU NONREINFORCED HK AND LOOP AAMI LEV 3

## (undated) DEVICE — 2.3MM TAPERED ROUTER

## (undated) DEVICE — RELOAD STPL L75MM OPN H3.8MM CLS 1.5MM WIRE DIA0.2MM REG

## (undated) DEVICE — GARMENT COMPR M FOR 12-18IN CALF INTMIT SGL BLDR HEMO-FORCE

## (undated) DEVICE — NEEDLE HYPO 22GA L1.5IN BLK S STL HUB POLYPR SHLD REG BVL

## (undated) DEVICE — GLOVE SURG SZ 75 CRM LTX FREE POLYISOPRENE POLYMER BEAD ANTI

## (undated) DEVICE — SUTURE PERMAHAND SZ 2-0 L30IN NONABSORBABLE BLK SH L26MM C016D

## (undated) DEVICE — DECANTER BAG 9": Brand: MEDLINE INDUSTRIES, INC.

## (undated) DEVICE — SVMMC CRANI PK

## (undated) DEVICE — ZYPHR DISPOSABLE CRANIAL PERFORATOR, LARGE 14/11MM: Brand: ZYPHR

## (undated) DEVICE — RENTAL TABLE ATTACHMENT

## (undated) DEVICE — SUTURE VICRYL + SZ 2 L27IN ABSRB UD TP-1 L65MM 1/2 CIR TAPR VCP849G

## (undated) DEVICE — SUTURE SILK PERMAHAND PRECUT 6 X 30 IN SZ 1 BLK BRAID A307H

## (undated) DEVICE — SURGICAL SUCTION CONNECTING TUBE WITH MALE CONNECTOR AND SUCTION CLAMP, 2 FT. LONG (.6 M), 5 MM I.D.: Brand: CONMED

## (undated) DEVICE — DRAPE,REIN 53X77,STERILE: Brand: MEDLINE

## (undated) DEVICE — SYRINGE 20ML LL S/C 50

## (undated) DEVICE — TOTAL TRAY, 16FR 10ML SIL FOLEY, URN: Brand: MEDLINE

## (undated) DEVICE — STAPLER SKIN H3.9MM WIRE DIA0.58MM CRWN 6.9MM 35 STPL FIX

## (undated) DEVICE — NERVE BLOCK TRAY: Brand: MEDLINE INDUSTRIES, INC.

## (undated) DEVICE — SUTURE ABSORBABLE L 60 IN SZ 0 NDL L 65 MM POLYDIOXANONE VIO

## (undated) DEVICE — SUTURE VICRYL SZ 0 L36IN ABSRB UD L36MM CT-1 1/2 CIR J946H

## (undated) DEVICE — SOLUTION IV IRRIG POUR BRL 0.9% SODIUM CHL 2F7124

## (undated) DEVICE — STRIP,CLOSURE,WOUND,MEDI-STRIP,1/2X4: Brand: MEDLINE

## (undated) DEVICE — SUTURE ABSORBABLE MONOFILAMENT 3-0 SH 27 IN UD PDS + PDP416H

## (undated) DEVICE — GOWN,AURORA,NONREINFORCED,LARGE: Brand: MEDLINE

## (undated) DEVICE — MARCON COLON DECOMPRESSION SET: Brand: MARCON

## (undated) DEVICE — STERILE PATIENT PROTECTIVE PAD FOR IMP® KNEE POSITIONERS & COHESIVE WRAP (10 / CASE): Brand: DE MAYO KNEE POSITIONER®

## (undated) DEVICE — LARGE BLUNT, DUAL PACK: Brand: LINA SKIN HOOK™

## (undated) DEVICE — DRESSING,GAUZE,XEROFORM,CURAD,1"X8",ST: Brand: CURAD

## (undated) DEVICE — SCREW BNE L14MM DIA2.4MM CORT S STL ST T8 STARDRV RECESS
Type: IMPLANTABLE DEVICE | Site: WRIST | Status: NON-FUNCTIONAL
Removed: 2018-12-20

## (undated) DEVICE — BANDAGE ADH W1XL3IN NAT FAB WVN FLX DURABLE N ADH PD SEAL

## (undated) DEVICE — LIGASURE IMPACT FT10 COMPATIBLE: Brand: MEDLINE

## (undated) DEVICE — FAN SPRAY KIT: Brand: PULSAVAC®

## (undated) DEVICE — SOLUTION SCRB 4OZ 4% CHG CLN BASE FOR PT SKIN ANTISEPSIS

## (undated) DEVICE — DUAL CUT SAGITTAL BLADE

## (undated) DEVICE — MARKER,SKIN,WI/RULER AND LABELS: Brand: MEDLINE

## (undated) DEVICE — GAUZE,SPONGE,FLUFF,6"X6.75",STRL,5/TRAY: Brand: MEDLINE

## (undated) DEVICE — SUTURE PERMAHAND SZ 3-0 L18IN NONABSORBABLE BLK L26MM SH C013D

## (undated) DEVICE — GLOVE SURG SZ 75 L12IN FNGR THK87MIL WHT LTX FREE

## (undated) DEVICE — NEEDLE, QUINCKE, 20GX3.5": Brand: MEDLINE

## (undated) DEVICE — BLADE SURG NO10 C STL STR DISP GLASSVAN

## (undated) DEVICE — PEN: MARKING STD 100/CS: Brand: MEDICAL ACTION INDUSTRIES

## (undated) DEVICE — SUTURE NRLN SZ 4-0 L18IN NONABSORBABLE BLK L13MM TF 1/2 CIR C584D

## (undated) DEVICE — SUTURE VICRYL + SZ 2-0 L36IN ABSRB UD L36MM CT-1 1/2 CIR VCP945H

## (undated) DEVICE — GLOVE SURG SZ 8 L11.77IN FNGR THK9.8MIL STRW LTX POLYMER

## (undated) DEVICE — SPONGE,NEURO,0.5"X0.5",XR,STRL,10/PK: Brand: MEDLINE

## (undated) DEVICE — SUTURE VICRYL + SZ 3-0 L18IN ABSRB UD SH 1/2 CIR TAPERCUT NDL VCP864D

## (undated) DEVICE — INTENDED FOR TISSUE SEPARATION, AND OTHER PROCEDURES THAT REQUIRE A SHARP SURGICAL BLADE TO PUNCTURE OR CUT.: Brand: BARD-PARKER ® CARBON RIB-BACK BLADES

## (undated) DEVICE — GLOVE SURG SZ 75 L12IN FNGR THK79MIL GRN LTX FREE

## (undated) DEVICE — TUBE FEED GASTROSTOMY MIC 20FR

## (undated) DEVICE — BINDER ABD SM M W9IN FOR 30 45IN 3 PNL E CNTCT CLSR POSTOP

## (undated) DEVICE — HAND AND FT PK

## (undated) DEVICE — ELECTRODE ES L2.75IN S STL INSUL BLDE W/ SL EDGE

## (undated) DEVICE — BLADE ES ELASTOMERIC COAT INSUL DURABLE BEND UPTO 90DEG

## (undated) DEVICE — BIT DRL L110MM DIA1.8MM QUIK CPL CALIB W/O STP REUSE

## (undated) DEVICE — CRANIOTOMY DRAPE, STERILE: Brand: MEDLINE

## (undated) DEVICE — SUTURE NONABSORBABLE MONOFILAMENT 3-0 PS-1 18 IN BLK ETHILON 1663H

## (undated) DEVICE — UNDERGLOVE BIOGEL ULTRATOUCH S INDICATOR SZ 8

## (undated) DEVICE — AGENT HEMOSTATIC SURGIFLOW MATRIX KIT W/THROMBIN

## (undated) DEVICE — NEEDLE SPNL 25GA L3.5IN BLU HUB S STL REG WALL FIT STYL W/

## (undated) DEVICE — ULTRACLEAN ACCESSORY ELECTRODE 4" (10.16 CM) COATED BLADE: Brand: ULTRACLEAN

## (undated) DEVICE — NEEDLE HYPO 25GA L1.5IN BLU POLYPR HUB S STL REG BVL STR

## (undated) DEVICE — SUTURE MCRYL SZ 4-0 L27IN ABSRB UD L19MM PS-2 1/2 CIR PRIM Y426H

## (undated) DEVICE — SEALANT TISS 10 CC FIBRIN VISTASEAL

## (undated) DEVICE — OCCLUSIVE GAUZE STRIP,3% BISMUTH TRIBROMOPHENATE IN PETROLATUM BLEND: Brand: XEROFORM

## (undated) DEVICE — SCREW BNE L18MM DIA2.4MM CORT S STL ST T8 STARDRV RECESS
Type: IMPLANTABLE DEVICE | Site: WRIST | Status: NON-FUNCTIONAL
Removed: 2018-12-20

## (undated) DEVICE — SUTURE VICRYL SZ 3-0 L18IN ABSRB UD L26MM SH 1/2 CIR J864D

## (undated) DEVICE — GLOVE SURG SZ 8 L12IN FNGR THK79MIL GRN LTX FREE

## (undated) DEVICE — GLOVE SURG SZ 8 CRM LTX FREE POLYISOPRENE POLYMER BEAD ANTI

## (undated) DEVICE — FOLEY TRAY 1 LAYR 16 FR 10 CC URIMTR SNAPSECURE URO175816S

## (undated) DEVICE — APPLICATOR MEDICATED 26 CC SOLUTION HI LT ORNG CHLORAPREP

## (undated) DEVICE — DISPOSABLE IRRIGATION BIPOLAR CORD, M1000 TYPE: Brand: KIRWAN